# Patient Record
Sex: FEMALE | Race: WHITE | NOT HISPANIC OR LATINO | Employment: UNEMPLOYED | URBAN - NONMETROPOLITAN AREA
[De-identification: names, ages, dates, MRNs, and addresses within clinical notes are randomized per-mention and may not be internally consistent; named-entity substitution may affect disease eponyms.]

---

## 2017-05-30 ENCOUNTER — HOSPITAL ENCOUNTER (INPATIENT)
Facility: HOSPITAL | Age: 72
LOS: 9 days | Discharge: NURSING, REHAB OR LONG-TERM CARE | End: 2017-06-08
Attending: EMERGENCY MEDICINE | Admitting: INTERNAL MEDICINE

## 2017-05-30 ENCOUNTER — APPOINTMENT (OUTPATIENT)
Dept: GENERAL RADIOLOGY | Facility: HOSPITAL | Age: 72
End: 2017-05-30

## 2017-05-30 DIAGNOSIS — J18.9 PNEUMONIA OF RIGHT MIDDLE LOBE DUE TO INFECTIOUS ORGANISM: ICD-10-CM

## 2017-05-30 DIAGNOSIS — J96.01 ACUTE RESPIRATORY FAILURE WITH HYPOXIA AND HYPERCAPNIA (HCC): ICD-10-CM

## 2017-05-30 DIAGNOSIS — J18.9 PNEUMONIA OF RIGHT LUNG DUE TO INFECTIOUS ORGANISM, UNSPECIFIED PART OF LUNG: Primary | ICD-10-CM

## 2017-05-30 DIAGNOSIS — J96.02 ACUTE RESPIRATORY FAILURE WITH HYPOXIA AND HYPERCAPNIA (HCC): ICD-10-CM

## 2017-05-30 PROBLEM — A41.9 SEPSIS DUE TO PNEUMONIA: Status: ACTIVE | Noted: 2017-05-30

## 2017-05-30 LAB
6-ACETYL MORPHINE: NEGATIVE
A-A DO2: 124.4 MMHG (ref 0–300)
A-A DO2: 53.2 MMHG (ref 0–300)
ALBUMIN SERPL-MCNC: 4.4 G/DL (ref 3.4–4.8)
ALBUMIN/GLOB SERPL: 1 G/DL (ref 1.5–2.5)
ALP SERPL-CCNC: 106 U/L (ref 35–104)
ALT SERPL W P-5'-P-CCNC: 34 U/L (ref 10–36)
AMPHET+METHAMPHET UR QL: NEGATIVE
AMYLASE SERPL-CCNC: 48 U/L (ref 28–100)
ANION GAP SERPL CALCULATED.3IONS-SCNC: 2.6 MMOL/L (ref 3.6–11.2)
APTT PPP: 23.8 SECONDS (ref 24.4–31)
ARTERIAL PATENCY WRIST A: ABNORMAL
ARTERIAL PATENCY WRIST A: POSITIVE
AST SERPL-CCNC: 54 U/L (ref 10–30)
ATMOSPHERIC PRESS: 729 MMHG
ATMOSPHERIC PRESS: 729 MMHG
BARBITURATES UR QL SCN: NEGATIVE
BASE EXCESS BLDA CALC-SCNC: -2.6 MMOL/L
BASE EXCESS BLDA CALC-SCNC: -3.3 MMOL/L
BASOPHILS # BLD AUTO: 0.05 10*3/MM3 (ref 0–0.3)
BASOPHILS NFR BLD AUTO: 0.3 % (ref 0–2)
BDY SITE: ABNORMAL
BDY SITE: ABNORMAL
BENZODIAZ UR QL SCN: NEGATIVE
BILIRUB SERPL-MCNC: 0.5 MG/DL (ref 0.2–1.8)
BILIRUB UR QL STRIP: NEGATIVE
BNP SERPL-MCNC: 284 PG/ML (ref 0–100)
BODY TEMPERATURE: 98.6 C
BODY TEMPERATURE: 98.6 C
BUN BLD-MCNC: 13 MG/DL (ref 7–21)
BUN/CREAT SERPL: 12.7 (ref 7–25)
BUPRENORPHINE SERPL-MCNC: NEGATIVE NG/ML
CALCIUM SPEC-SCNC: 9.2 MG/DL (ref 7.7–10)
CANNABINOIDS SERPL QL: NEGATIVE
CHLORIDE SERPL-SCNC: 104 MMOL/L (ref 99–112)
CLARITY UR: CLEAR
CO2 SERPL-SCNC: 31.4 MMOL/L (ref 24.3–31.9)
COCAINE UR QL: NEGATIVE
COHGB MFR BLD: 1.6 % (ref 0–5)
COHGB MFR BLD: 1.7 % (ref 0–5)
COLOR UR: YELLOW
CREAT BLD-MCNC: 1.02 MG/DL (ref 0.43–1.29)
D-LACTATE SERPL-SCNC: 2.4 MMOL/L (ref 0.5–2)
D-LACTATE SERPL-SCNC: 2.7 MMOL/L (ref 0.5–2)
DEPRECATED RDW RBC AUTO: 41.6 FL (ref 37–54)
EOSINOPHIL # BLD AUTO: 0.18 10*3/MM3 (ref 0–0.7)
EOSINOPHIL NFR BLD AUTO: 1 % (ref 0–7)
ERYTHROCYTE [DISTWIDTH] IN BLOOD BY AUTOMATED COUNT: 13 % (ref 11.5–14.5)
GFR SERPL CREATININE-BSD FRML MDRD: 54 ML/MIN/1.73
GLOBULIN UR ELPH-MCNC: 4.2 GM/DL
GLUCOSE BLD-MCNC: 308 MG/DL (ref 70–110)
GLUCOSE UR STRIP-MCNC: NEGATIVE MG/DL
HCO3 BLDA-SCNC: 24.4 MMOL/L (ref 22–26)
HCO3 BLDA-SCNC: 29.9 MMOL/L (ref 22–26)
HCT VFR BLD AUTO: 47 % (ref 37–47)
HCT VFR BLD CALC: 40 % (ref 37–47)
HCT VFR BLD CALC: 45 % (ref 37–47)
HGB BLD-MCNC: 14.4 G/DL (ref 12–16)
HGB BLDA-MCNC: 13.7 G/DL (ref 12–16)
HGB BLDA-MCNC: 15.2 G/DL (ref 12–16)
HGB UR QL STRIP.AUTO: NEGATIVE
HOROWITZ INDEX BLD+IHG-RTO: 32 %
HOROWITZ INDEX BLD+IHG-RTO: 40 %
IMM GRANULOCYTES # BLD: 0.1 10*3/MM3 (ref 0–0.03)
IMM GRANULOCYTES NFR BLD: 0.5 % (ref 0–0.5)
INR PPP: 0.93 (ref 0.8–1.1)
KETONES UR QL STRIP: NEGATIVE
LEUKOCYTE ESTERASE UR QL STRIP.AUTO: NEGATIVE
LIPASE SERPL-CCNC: 33 U/L (ref 13–60)
LYMPHOCYTES # BLD AUTO: 4.32 10*3/MM3 (ref 1–3)
LYMPHOCYTES NFR BLD AUTO: 23 % (ref 16–46)
MCH RBC QN AUTO: 27.1 PG (ref 27–33)
MCHC RBC AUTO-ENTMCNC: 30.6 G/DL (ref 33–37)
MCV RBC AUTO: 88.5 FL (ref 80–94)
MDMA UR QL SCN: NEGATIVE
METHADONE UR QL SCN: NEGATIVE
METHGB BLD QL: 0.4 % (ref 0–3)
METHGB BLD QL: 0.5 % (ref 0–3)
MODALITY: ABNORMAL
MODALITY: ABNORMAL
MONOCYTES # BLD AUTO: 1.46 10*3/MM3 (ref 0.1–0.9)
MONOCYTES NFR BLD AUTO: 7.8 % (ref 0–12)
NEUTROPHILS # BLD AUTO: 12.69 10*3/MM3 (ref 1.4–6.5)
NEUTROPHILS NFR BLD AUTO: 67.4 % (ref 40–75)
NITRITE UR QL STRIP: NEGATIVE
NRBC BLD MANUAL-RTO: 0 /100 WBC (ref 0–0)
OPIATES UR QL: NEGATIVE
OSMOLALITY SERPL CALC.SUM OF ELEC: 287.4 MOSM/KG (ref 273–305)
OXYCODONE UR QL SCN: NEGATIVE
OXYHGB MFR BLDV: 76.4 % (ref 85–100)
OXYHGB MFR BLDV: 93.3 % (ref 85–100)
PCO2 BLDA: 54.7 MM HG (ref 35–45)
PCO2 BLDA: 95 MM HG (ref 35–45)
PCP UR QL SCN: NEGATIVE
PEEP RESPIRATORY: 5 CM[H2O]
PH BLDA: 7.12 PH UNITS (ref 7.35–7.45)
PH BLDA: 7.27 PH UNITS (ref 7.35–7.45)
PH UR STRIP.AUTO: 6 [PH] (ref 5–8)
PLATELET # BLD AUTO: 298 10*3/MM3 (ref 130–400)
PMV BLD AUTO: 12.2 FL (ref 6–10)
PO2 BLDA: 53.9 MM HG (ref 80–100)
PO2 BLDA: 85.5 MM HG (ref 80–100)
POTASSIUM BLD-SCNC: 5.5 MMOL/L (ref 3.5–5.3)
PROT SERPL-MCNC: 8.6 G/DL (ref 6–8)
PROT UR QL STRIP: ABNORMAL
PROTHROMBIN TIME: 10.3 SECONDS (ref 9.8–11.9)
RBC # BLD AUTO: 5.31 10*6/MM3 (ref 4.2–5.4)
SAO2 % BLDCOA: 78 % (ref 90–100)
SAO2 % BLDCOA: 95.3 % (ref 90–100)
SET MECH RESP RATE: 22
SODIUM BLD-SCNC: 138 MMOL/L (ref 135–153)
SP GR UR STRIP: 1.02 (ref 1–1.03)
TROPONIN I SERPL-MCNC: 0.06 NG/ML
TROPONIN I SERPL-MCNC: 0.72 NG/ML
UROBILINOGEN UR QL STRIP: ABNORMAL
VENTILATOR MODE: ABNORMAL
VT ON VENT VENT: 500 ML
WBC NRBC COR # BLD: 18.8 10*3/MM3 (ref 4.5–12.5)

## 2017-05-30 PROCEDURE — 25010000002 HEPARIN (PORCINE) PER 1000 UNITS: Performed by: INTERNAL MEDICINE

## 2017-05-30 PROCEDURE — 82550 ASSAY OF CK (CPK): CPT | Performed by: INTERNAL MEDICINE

## 2017-05-30 PROCEDURE — 94799 UNLISTED PULMONARY SVC/PX: CPT

## 2017-05-30 PROCEDURE — 80307 DRUG TEST PRSMV CHEM ANLYZR: CPT | Performed by: EMERGENCY MEDICINE

## 2017-05-30 PROCEDURE — 83050 HGB METHEMOGLOBIN QUAN: CPT | Performed by: EMERGENCY MEDICINE

## 2017-05-30 PROCEDURE — 93005 ELECTROCARDIOGRAM TRACING: CPT | Performed by: EMERGENCY MEDICINE

## 2017-05-30 PROCEDURE — 80061 LIPID PANEL: CPT | Performed by: INTERNAL MEDICINE

## 2017-05-30 PROCEDURE — 25010000002 SUCCINYLCHOLINE PER 20 MG: Performed by: EMERGENCY MEDICINE

## 2017-05-30 PROCEDURE — 85025 COMPLETE CBC W/AUTO DIFF WBC: CPT | Performed by: EMERGENCY MEDICINE

## 2017-05-30 PROCEDURE — 87449 NOS EACH ORGANISM AG IA: CPT | Performed by: INTERNAL MEDICINE

## 2017-05-30 PROCEDURE — 80053 COMPREHEN METABOLIC PANEL: CPT | Performed by: EMERGENCY MEDICINE

## 2017-05-30 PROCEDURE — 87147 CULTURE TYPE IMMUNOLOGIC: CPT | Performed by: EMERGENCY MEDICINE

## 2017-05-30 PROCEDURE — 25010000002 FUROSEMIDE PER 20 MG: Performed by: EMERGENCY MEDICINE

## 2017-05-30 PROCEDURE — 87077 CULTURE AEROBIC IDENTIFY: CPT | Performed by: EMERGENCY MEDICINE

## 2017-05-30 PROCEDURE — 36600 WITHDRAWAL OF ARTERIAL BLOOD: CPT | Performed by: EMERGENCY MEDICINE

## 2017-05-30 PROCEDURE — 83605 ASSAY OF LACTIC ACID: CPT | Performed by: EMERGENCY MEDICINE

## 2017-05-30 PROCEDURE — 25010000002 MIDAZOLAM 0.5 MG/ML 100 ML NS

## 2017-05-30 PROCEDURE — 87040 BLOOD CULTURE FOR BACTERIA: CPT | Performed by: EMERGENCY MEDICINE

## 2017-05-30 PROCEDURE — 25010000002 CEFTRIAXONE: Performed by: EMERGENCY MEDICINE

## 2017-05-30 PROCEDURE — 83880 ASSAY OF NATRIURETIC PEPTIDE: CPT | Performed by: EMERGENCY MEDICINE

## 2017-05-30 PROCEDURE — 51702 INSERT TEMP BLADDER CATH: CPT

## 2017-05-30 PROCEDURE — 5A1955Z RESPIRATORY VENTILATION, GREATER THAN 96 CONSECUTIVE HOURS: ICD-10-PCS | Performed by: INTERNAL MEDICINE

## 2017-05-30 PROCEDURE — 84484 ASSAY OF TROPONIN QUANT: CPT | Performed by: EMERGENCY MEDICINE

## 2017-05-30 PROCEDURE — 25010000002 MIDAZOLAM PER 1 MG

## 2017-05-30 PROCEDURE — 83690 ASSAY OF LIPASE: CPT | Performed by: EMERGENCY MEDICINE

## 2017-05-30 PROCEDURE — 31500 INSERT EMERGENCY AIRWAY: CPT

## 2017-05-30 PROCEDURE — 94002 VENT MGMT INPAT INIT DAY: CPT

## 2017-05-30 PROCEDURE — 99291 CRITICAL CARE FIRST HOUR: CPT | Performed by: INTERNAL MEDICINE

## 2017-05-30 PROCEDURE — 71010 XR CHEST 1 VW: CPT | Performed by: RADIOLOGY

## 2017-05-30 PROCEDURE — 85610 PROTHROMBIN TIME: CPT | Performed by: EMERGENCY MEDICINE

## 2017-05-30 PROCEDURE — 82553 CREATINE MB FRACTION: CPT | Performed by: INTERNAL MEDICINE

## 2017-05-30 PROCEDURE — 81003 URINALYSIS AUTO W/O SCOPE: CPT | Performed by: EMERGENCY MEDICINE

## 2017-05-30 PROCEDURE — 99291 CRITICAL CARE FIRST HOUR: CPT

## 2017-05-30 PROCEDURE — 0BH17EZ INSERTION OF ENDOTRACHEAL AIRWAY INTO TRACHEA, VIA NATURAL OR ARTIFICIAL OPENING: ICD-10-PCS | Performed by: INTERNAL MEDICINE

## 2017-05-30 PROCEDURE — 83874 ASSAY OF MYOGLOBIN: CPT | Performed by: INTERNAL MEDICINE

## 2017-05-30 PROCEDURE — 82805 BLOOD GASES W/O2 SATURATION: CPT | Performed by: EMERGENCY MEDICINE

## 2017-05-30 PROCEDURE — 25010000002 PROPOFOL 1000 MG/ML EMULSION: Performed by: EMERGENCY MEDICINE

## 2017-05-30 PROCEDURE — 82375 ASSAY CARBOXYHB QUANT: CPT | Performed by: EMERGENCY MEDICINE

## 2017-05-30 PROCEDURE — 25010000002 AZITHROMYCIN: Performed by: EMERGENCY MEDICINE

## 2017-05-30 PROCEDURE — 63710000001 INSULIN REGULAR HUMAN PER 5 UNITS: Performed by: EMERGENCY MEDICINE

## 2017-05-30 PROCEDURE — 87186 SC STD MICRODIL/AGAR DIL: CPT | Performed by: EMERGENCY MEDICINE

## 2017-05-30 PROCEDURE — 85730 THROMBOPLASTIN TIME PARTIAL: CPT | Performed by: EMERGENCY MEDICINE

## 2017-05-30 PROCEDURE — 84484 ASSAY OF TROPONIN QUANT: CPT | Performed by: INTERNAL MEDICINE

## 2017-05-30 PROCEDURE — 71010 HC CHEST PA OR AP: CPT

## 2017-05-30 PROCEDURE — 82150 ASSAY OF AMYLASE: CPT | Performed by: INTERNAL MEDICINE

## 2017-05-30 RX ORDER — SUCCINYLCHOLINE CHLORIDE 20 MG/ML
150 INJECTION INTRAMUSCULAR; INTRAVENOUS ONCE
Status: COMPLETED | OUTPATIENT
Start: 2017-05-30 | End: 2017-05-30

## 2017-05-30 RX ORDER — MIDAZOLAM HYDROCHLORIDE 1 MG/ML
INJECTION INTRAMUSCULAR; INTRAVENOUS
Status: COMPLETED
Start: 2017-05-30 | End: 2017-05-30

## 2017-05-30 RX ORDER — METOPROLOL SUCCINATE 50 MG/1
100 TABLET, EXTENDED RELEASE ORAL DAILY
Status: CANCELLED | OUTPATIENT
Start: 2017-05-31

## 2017-05-30 RX ORDER — IPRATROPIUM BROMIDE AND ALBUTEROL SULFATE 2.5; .5 MG/3ML; MG/3ML
3 SOLUTION RESPIRATORY (INHALATION)
Status: DISCONTINUED | OUTPATIENT
Start: 2017-05-31 | End: 2017-06-08 | Stop reason: HOSPADM

## 2017-05-30 RX ORDER — FENOFIBRATE 48 MG/1
48 TABLET, COATED ORAL DAILY
Status: CANCELLED | OUTPATIENT
Start: 2017-05-31

## 2017-05-30 RX ORDER — SODIUM CHLORIDE 9 MG/ML
INJECTION, SOLUTION INTRAVENOUS
Status: COMPLETED
Start: 2017-05-30 | End: 2017-05-30

## 2017-05-30 RX ORDER — ASPIRIN 325 MG
325 TABLET ORAL DAILY
Status: DISCONTINUED | OUTPATIENT
Start: 2017-05-31 | End: 2017-06-08 | Stop reason: HOSPADM

## 2017-05-30 RX ORDER — MIDAZOLAM IN 0.9 % SOD.CHLORID 1 MG/ML
1-10 PLASTIC BAG, INJECTION (ML) INTRAVENOUS
Status: DISCONTINUED | OUTPATIENT
Start: 2017-05-30 | End: 2017-05-30

## 2017-05-30 RX ORDER — ASPIRIN 325 MG
325 TABLET ORAL ONCE
Status: COMPLETED | OUTPATIENT
Start: 2017-05-31 | End: 2017-05-31

## 2017-05-30 RX ORDER — METOPROLOL SUCCINATE 100 MG/1
100 TABLET, EXTENDED RELEASE ORAL DAILY
COMMUNITY
End: 2017-06-08 | Stop reason: HOSPADM

## 2017-05-30 RX ORDER — FENOFIBRATE 54 MG/1
54 TABLET ORAL DAILY
COMMUNITY
End: 2017-06-08 | Stop reason: HOSPADM

## 2017-05-30 RX ORDER — ETOMIDATE 2 MG/ML
30 INJECTION INTRAVENOUS ONCE
Status: COMPLETED | OUTPATIENT
Start: 2017-05-30 | End: 2017-05-30

## 2017-05-30 RX ORDER — MIDAZOLAM HYDROCHLORIDE 1 MG/ML
4 INJECTION INTRAMUSCULAR; INTRAVENOUS ONCE
Status: COMPLETED | OUTPATIENT
Start: 2017-05-30 | End: 2017-05-30

## 2017-05-30 RX ORDER — SODIUM CHLORIDE 9 MG/ML
100 INJECTION, SOLUTION INTRAVENOUS CONTINUOUS
Status: DISCONTINUED | OUTPATIENT
Start: 2017-05-30 | End: 2017-06-02

## 2017-05-30 RX ORDER — ACETAMINOPHEN 650 MG/1
650 SUPPOSITORY RECTAL ONCE
Status: COMPLETED | OUTPATIENT
Start: 2017-05-30 | End: 2017-05-30

## 2017-05-30 RX ORDER — FUROSEMIDE 10 MG/ML
40 INJECTION INTRAMUSCULAR; INTRAVENOUS ONCE
Status: COMPLETED | OUTPATIENT
Start: 2017-05-30 | End: 2017-05-30

## 2017-05-30 RX ORDER — MIDAZOLAM IN 0.9 % SOD.CHLORID 1 MG/ML
PLASTIC BAG, INJECTION (ML) INTRAVENOUS
Status: COMPLETED
Start: 2017-05-30 | End: 2017-05-30

## 2017-05-30 RX ORDER — MIDAZOLAM HYDROCHLORIDE 1 MG/ML
2 INJECTION INTRAMUSCULAR; INTRAVENOUS ONCE
Status: COMPLETED | OUTPATIENT
Start: 2017-05-30 | End: 2017-05-30

## 2017-05-30 RX ORDER — LISINOPRIL 10 MG/1
10 TABLET ORAL DAILY
Status: CANCELLED | OUTPATIENT
Start: 2017-05-31

## 2017-05-30 RX ORDER — SODIUM CHLORIDE 0.9 % (FLUSH) 0.9 %
1-10 SYRINGE (ML) INJECTION AS NEEDED
Status: DISCONTINUED | OUTPATIENT
Start: 2017-05-30 | End: 2017-06-08 | Stop reason: HOSPADM

## 2017-05-30 RX ORDER — FUROSEMIDE 20 MG/1
20 TABLET ORAL DAILY
Status: CANCELLED | OUTPATIENT
Start: 2017-05-31

## 2017-05-30 RX ORDER — HEPARIN SODIUM 5000 [USP'U]/ML
5000 INJECTION, SOLUTION INTRAVENOUS; SUBCUTANEOUS EVERY 12 HOURS SCHEDULED
Status: DISCONTINUED | OUTPATIENT
Start: 2017-05-30 | End: 2017-05-31

## 2017-05-30 RX ADMIN — Medication 1 MG/HR: at 18:35

## 2017-05-30 RX ADMIN — SODIUM CHLORIDE 100 ML/HR: 900 INJECTION, SOLUTION INTRAVENOUS at 23:23

## 2017-05-30 RX ADMIN — ACETAMINOPHEN 650 MG: 650 SUPPOSITORY RECTAL at 16:45

## 2017-05-30 RX ADMIN — ETOMIDATE 30 MG: 2 INJECTION, SOLUTION INTRAVENOUS at 16:35

## 2017-05-30 RX ADMIN — HEPARIN SODIUM 5000 UNITS: 5000 INJECTION, SOLUTION INTRAVENOUS; SUBCUTANEOUS at 23:24

## 2017-05-30 RX ADMIN — SUCCINYLCHOLINE CHLORIDE 150 MG: 20 INJECTION, SOLUTION INTRAMUSCULAR; INTRAVENOUS at 16:37

## 2017-05-30 RX ADMIN — AZITHROMYCIN 500 MG: 500 INJECTION, POWDER, LYOPHILIZED, FOR SOLUTION INTRAVENOUS at 17:26

## 2017-05-30 RX ADMIN — FUROSEMIDE 40 MG: 10 INJECTION, SOLUTION INTRAMUSCULAR; INTRAVENOUS at 17:05

## 2017-05-30 RX ADMIN — MIDAZOLAM HYDROCHLORIDE 2 MG: 1 INJECTION INTRAMUSCULAR; INTRAVENOUS at 20:01

## 2017-05-30 RX ADMIN — PROPOFOL 5 MCG/KG/MIN: 10 INJECTION, EMULSION INTRAVENOUS at 16:50

## 2017-05-30 RX ADMIN — MIDAZOLAM HYDROCHLORIDE 4 MG: 1 INJECTION INTRAMUSCULAR; INTRAVENOUS at 18:10

## 2017-05-30 RX ADMIN — MIDAZOLAM HYDROCHLORIDE 2 MG: 1 INJECTION, SOLUTION INTRAMUSCULAR; INTRAVENOUS at 20:01

## 2017-05-30 RX ADMIN — HUMAN INSULIN 8 UNITS: 100 INJECTION, SOLUTION SUBCUTANEOUS at 19:30

## 2017-05-30 RX ADMIN — MIDAZOLAM HYDROCHLORIDE 4 MG: 1 INJECTION, SOLUTION INTRAMUSCULAR; INTRAVENOUS at 18:10

## 2017-05-30 RX ADMIN — CEFTRIAXONE 1 G: 1 INJECTION, POWDER, FOR SOLUTION INTRAMUSCULAR; INTRAVENOUS at 16:57

## 2017-05-30 RX ADMIN — SODIUM CHLORIDE 500 ML: 9 INJECTION, SOLUTION INTRAVENOUS at 19:30

## 2017-05-30 RX ADMIN — SODIUM CHLORIDE 1000 ML: 9 INJECTION, SOLUTION INTRAVENOUS at 17:52

## 2017-05-31 ENCOUNTER — APPOINTMENT (OUTPATIENT)
Dept: CARDIOLOGY | Facility: HOSPITAL | Age: 72
End: 2017-05-31
Attending: INTERNAL MEDICINE

## 2017-05-31 LAB
A-A DO2: 168.1 MMHG (ref 0–300)
A-A DO2: 170.2 MMHG (ref 0–300)
ALBUMIN SERPL-MCNC: 3.5 G/DL (ref 3.4–4.8)
ALBUMIN/GLOB SERPL: 1 G/DL (ref 1.5–2.5)
ALP SERPL-CCNC: 84 U/L (ref 35–104)
ALT SERPL W P-5'-P-CCNC: 33 U/L (ref 10–36)
ANION GAP SERPL CALCULATED.3IONS-SCNC: 8.3 MMOL/L (ref 3.6–11.2)
APTT PPP: 25.4 SECONDS (ref 24.4–31)
APTT PPP: 32.9 SECONDS (ref 24.4–31)
APTT PPP: 41.6 SECONDS (ref 24.4–31)
APTT PPP: 59.2 SECONDS (ref 24.4–31)
ARTERIAL PATENCY WRIST A: ABNORMAL
ARTERIAL PATENCY WRIST A: POSITIVE
AST SERPL-CCNC: 44 U/L (ref 10–30)
ATMOSPHERIC PRESS: 727 MMHG
ATMOSPHERIC PRESS: 727 MMHG
BASE EXCESS BLDA CALC-SCNC: -0.4 MMOL/L
BASE EXCESS BLDA CALC-SCNC: -4.4 MMOL/L
BASOPHILS # BLD AUTO: 0.01 10*3/MM3 (ref 0–0.3)
BASOPHILS NFR BLD AUTO: 0.1 % (ref 0–2)
BDY SITE: ABNORMAL
BDY SITE: ABNORMAL
BH CV ECHO MEAS - ACS: 2 CM
BH CV ECHO MEAS - AO ROOT AREA (BSA CORRECTED): 1.4
BH CV ECHO MEAS - AO ROOT AREA: 7.1 CM^2
BH CV ECHO MEAS - AO ROOT DIAM: 3 CM
BH CV ECHO MEAS - BSA(HAYCOCK): 2.3 M^2
BH CV ECHO MEAS - BSA: 2.2 M^2
BH CV ECHO MEAS - BZI_BMI: 35.9 KILOGRAMS/M^2
BH CV ECHO MEAS - BZI_METRIC_HEIGHT: 172.7 CM
BH CV ECHO MEAS - BZI_METRIC_WEIGHT: 107.1 KG
BH CV ECHO MEAS - CONTRAST EF 4CH: 61.3 ML/M^2
BH CV ECHO MEAS - EDV(CUBED): 167.5 ML
BH CV ECHO MEAS - EDV(MOD-SP4): 106 ML
BH CV ECHO MEAS - EDV(TEICH): 148.2 ML
BH CV ECHO MEAS - EF(CUBED): 76.4 %
BH CV ECHO MEAS - EF(TEICH): 67.9 %
BH CV ECHO MEAS - ESV(CUBED): 39.5 ML
BH CV ECHO MEAS - ESV(MOD-SP4): 41 ML
BH CV ECHO MEAS - ESV(TEICH): 47.6 ML
BH CV ECHO MEAS - FS: 38.2 %
BH CV ECHO MEAS - IVS/LVPW: 1.2
BH CV ECHO MEAS - IVSD: 1.3 CM
BH CV ECHO MEAS - LA DIMENSION: 4 CM
BH CV ECHO MEAS - LA/AO: 1.3
BH CV ECHO MEAS - LV DIASTOLIC VOL/BSA (35-75): 48.3 ML/M^2
BH CV ECHO MEAS - LV MASS(C)D: 269.5 GRAMS
BH CV ECHO MEAS - LV MASS(C)DI: 122.9 GRAMS/M^2
BH CV ECHO MEAS - LV SYSTOLIC VOL/BSA (12-30): 18.7 ML/M^2
BH CV ECHO MEAS - LVIDD: 5.5 CM
BH CV ECHO MEAS - LVIDS: 3.4 CM
BH CV ECHO MEAS - LVLD AP4: 6.6 CM
BH CV ECHO MEAS - LVLS AP4: 6.9 CM
BH CV ECHO MEAS - LVOT AREA (M): 3.1 CM^2
BH CV ECHO MEAS - LVOT AREA: 3.2 CM^2
BH CV ECHO MEAS - LVOT DIAM: 2 CM
BH CV ECHO MEAS - LVPWD: 1.1 CM
BH CV ECHO MEAS - MV A MAX VEL: 83 CM/SEC
BH CV ECHO MEAS - MV E MAX VEL: 78.2 CM/SEC
BH CV ECHO MEAS - MV E/A: 0.94
BH CV ECHO MEAS - PA ACC SLOPE: 945.7 CM/SEC^2
BH CV ECHO MEAS - PA ACC TIME: 0.1 SEC
BH CV ECHO MEAS - PA PR(ACCEL): 34.6 MMHG
BH CV ECHO MEAS - RAP SYSTOLE: 10 MMHG
BH CV ECHO MEAS - RVSP: 35.8 MMHG
BH CV ECHO MEAS - SI(CUBED): 58.4 ML/M^2
BH CV ECHO MEAS - SI(MOD-SP4): 29.6 ML/M^2
BH CV ECHO MEAS - SI(TEICH): 45.8 ML/M^2
BH CV ECHO MEAS - SV(CUBED): 128 ML
BH CV ECHO MEAS - SV(MOD-SP4): 65 ML
BH CV ECHO MEAS - SV(TEICH): 100.5 ML
BH CV ECHO MEAS - TR MAX VEL: 253.8 CM/SEC
BILIRUB SERPL-MCNC: 0.4 MG/DL (ref 0.2–1.8)
BNP SERPL-MCNC: 306 PG/ML (ref 0–100)
BODY TEMPERATURE: 98.6 C
BODY TEMPERATURE: 98.6 C
BUN BLD-MCNC: 20 MG/DL (ref 7–21)
BUN/CREAT SERPL: 17.1 (ref 7–25)
CALCIUM SPEC-SCNC: 8.9 MG/DL (ref 7.7–10)
CHLORIDE SERPL-SCNC: 108 MMOL/L (ref 99–112)
CHOLEST SERPL-MCNC: 125 MG/DL (ref 0–200)
CK MB SERPL-CCNC: 2.59 NG/ML (ref 0–5)
CK MB SERPL-CCNC: 2.76 NG/ML (ref 0–5)
CK MB SERPL-CCNC: 2.87 NG/ML (ref 0–5)
CK MB SERPL-RTO: 5.6 % (ref 0–3)
CK SERPL-CCNC: 45 U/L (ref 24–173)
CK SERPL-CCNC: 51 U/L (ref 24–173)
CO2 SERPL-SCNC: 24.7 MMOL/L (ref 24.3–31.9)
COHGB MFR BLD: 1.3 % (ref 0–5)
COHGB MFR BLD: 1.8 % (ref 0–5)
CREAT BLD-MCNC: 1.17 MG/DL (ref 0.43–1.29)
CRP SERPL-MCNC: 5.64 MG/DL (ref 0–0.99)
DEPRECATED RDW RBC AUTO: 40.6 FL (ref 37–54)
EOSINOPHIL # BLD AUTO: 0 10*3/MM3 (ref 0–0.7)
EOSINOPHIL NFR BLD AUTO: 0 % (ref 0–7)
ERYTHROCYTE [DISTWIDTH] IN BLOOD BY AUTOMATED COUNT: 13 % (ref 11.5–14.5)
GFR SERPL CREATININE-BSD FRML MDRD: 46 ML/MIN/1.73
GLOBULIN UR ELPH-MCNC: 3.5 GM/DL
GLUCOSE BLD-MCNC: 201 MG/DL (ref 70–110)
GLUCOSE BLDC GLUCOMTR-MCNC: 157 MG/DL (ref 70–130)
GLUCOSE BLDC GLUCOMTR-MCNC: 163 MG/DL (ref 70–130)
HBA1C MFR BLD: 7.3 % (ref 4.5–5.7)
HCO3 BLDA-SCNC: 19.7 MMOL/L (ref 22–26)
HCO3 BLDA-SCNC: 22.4 MMOL/L (ref 22–26)
HCT VFR BLD AUTO: 39.7 % (ref 37–47)
HCT VFR BLD CALC: 37 % (ref 37–47)
HCT VFR BLD CALC: 38 % (ref 37–47)
HDLC SERPL-MCNC: 34 MG/DL (ref 60–100)
HGB BLD-MCNC: 12.5 G/DL (ref 12–16)
HGB BLDA-MCNC: 12.6 G/DL (ref 12–16)
HGB BLDA-MCNC: 13 G/DL (ref 12–16)
HOROWITZ INDEX BLD+IHG-RTO: 40 %
HOROWITZ INDEX BLD+IHG-RTO: 40 %
IMM GRANULOCYTES # BLD: 0.07 10*3/MM3 (ref 0–0.03)
IMM GRANULOCYTES NFR BLD: 0.5 % (ref 0–0.5)
INR PPP: 0.98 (ref 0.8–1.1)
INR PPP: 1.03 (ref 0.8–1.1)
L PNEUMO1 AG UR QL IA: NEGATIVE
LDLC SERPL CALC-MCNC: 78 MG/DL (ref 0–100)
LDLC/HDLC SERPL: 2.29 {RATIO}
LYMPHOCYTES # BLD AUTO: 0.89 10*3/MM3 (ref 1–3)
LYMPHOCYTES NFR BLD AUTO: 6.4 % (ref 16–46)
M PNEUMO IGM SER QL: NEGATIVE
MAGNESIUM SERPL-MCNC: 1.6 MG/DL (ref 1.7–2.6)
MCH RBC QN AUTO: 27.1 PG (ref 27–33)
MCHC RBC AUTO-ENTMCNC: 31.5 G/DL (ref 33–37)
MCV RBC AUTO: 85.9 FL (ref 80–94)
METHGB BLD QL: 0.2 % (ref 0–3)
METHGB BLD QL: 0.3 % (ref 0–3)
MODALITY: ABNORMAL
MODALITY: ABNORMAL
MONOCYTES # BLD AUTO: 0.68 10*3/MM3 (ref 0.1–0.9)
MONOCYTES NFR BLD AUTO: 4.9 % (ref 0–12)
MYOGLOBIN SERPL-MCNC: 75 NG/ML (ref 0–109)
MYOGLOBIN SERPL-MCNC: 91 NG/ML (ref 0–109)
MYOGLOBIN SERPL-MCNC: 98 NG/ML (ref 0–109)
NEUTROPHILS # BLD AUTO: 12.34 10*3/MM3 (ref 1.4–6.5)
NEUTROPHILS NFR BLD AUTO: 88.1 % (ref 40–75)
OSMOLALITY SERPL CALC.SUM OF ELEC: 289.6 MOSM/KG (ref 273–305)
OXYHGB MFR BLDV: 90.8 % (ref 85–100)
OXYHGB MFR BLDV: 92.9 % (ref 85–100)
PCO2 BLDA: 31.4 MM HG (ref 35–45)
PCO2 BLDA: 33.5 MM HG (ref 35–45)
PEEP RESPIRATORY: 5 CM[H2O]
PEEP RESPIRATORY: 5 CM[H2O]
PH BLDA: 7.39 PH UNITS (ref 7.35–7.45)
PH BLDA: 7.47 PH UNITS (ref 7.35–7.45)
PHOSPHATE SERPL-MCNC: 1.2 MG/DL (ref 2.7–4.5)
PLATELET # BLD AUTO: 204 10*3/MM3 (ref 130–400)
PMV BLD AUTO: 11.9 FL (ref 6–10)
PO2 BLDA: 65.4 MM HG (ref 80–100)
PO2 BLDA: 65.7 MM HG (ref 80–100)
POTASSIUM BLD-SCNC: 3.6 MMOL/L (ref 3.5–5.3)
PROT SERPL-MCNC: 7 G/DL (ref 6–8)
PROTHROMBIN TIME: 10.9 SECONDS (ref 9.8–11.9)
PROTHROMBIN TIME: 11.5 SECONDS (ref 9.8–11.9)
RBC # BLD AUTO: 4.62 10*6/MM3 (ref 4.2–5.4)
SAO2 % BLDCOA: 92.7 % (ref 90–100)
SAO2 % BLDCOA: 94.3 % (ref 90–100)
SET MECH RESP RATE: 18
SET MECH RESP RATE: 22
SODIUM BLD-SCNC: 141 MMOL/L (ref 135–153)
T4 FREE SERPL-MCNC: 1.88 NG/DL (ref 0.89–1.76)
TRIGL SERPL-MCNC: 65 MG/DL (ref 0–150)
TROPONIN I SERPL-MCNC: 0.45 NG/ML
TROPONIN I SERPL-MCNC: 0.74 NG/ML
TROPONIN I SERPL-MCNC: 1.12 NG/ML
VENTILATOR MODE: AC
VENTILATOR MODE: AC
VLDLC SERPL-MCNC: 13 MG/DL
VT ON VENT VENT: 500 ML
VT ON VENT VENT: 500 ML
WBC NRBC COR # BLD: 13.99 10*3/MM3 (ref 4.5–12.5)

## 2017-05-31 PROCEDURE — 31500 INSERT EMERGENCY AIRWAY: CPT | Performed by: EMERGENCY MEDICINE

## 2017-05-31 PROCEDURE — 84100 ASSAY OF PHOSPHORUS: CPT | Performed by: INTERNAL MEDICINE

## 2017-05-31 PROCEDURE — 94799 UNLISTED PULMONARY SVC/PX: CPT

## 2017-05-31 PROCEDURE — 94003 VENT MGMT INPAT SUBQ DAY: CPT

## 2017-05-31 PROCEDURE — 80053 COMPREHEN METABOLIC PANEL: CPT | Performed by: INTERNAL MEDICINE

## 2017-05-31 PROCEDURE — 99291 CRITICAL CARE FIRST HOUR: CPT | Performed by: INTERNAL MEDICINE

## 2017-05-31 PROCEDURE — 93010 ELECTROCARDIOGRAM REPORT: CPT | Performed by: INTERNAL MEDICINE

## 2017-05-31 PROCEDURE — 87040 BLOOD CULTURE FOR BACTERIA: CPT | Performed by: INTERNAL MEDICINE

## 2017-05-31 PROCEDURE — 25010000002 PROPOFOL 1000 MG/ML EMULSION: Performed by: INTERNAL MEDICINE

## 2017-05-31 PROCEDURE — 82375 ASSAY CARBOXYHB QUANT: CPT | Performed by: INTERNAL MEDICINE

## 2017-05-31 PROCEDURE — 83874 ASSAY OF MYOGLOBIN: CPT | Performed by: INTERNAL MEDICINE

## 2017-05-31 PROCEDURE — 85610 PROTHROMBIN TIME: CPT | Performed by: INTERNAL MEDICINE

## 2017-05-31 PROCEDURE — 25010000002 HEPARIN (PORCINE) PER 1000 UNITS: Performed by: INTERNAL MEDICINE

## 2017-05-31 PROCEDURE — 82962 GLUCOSE BLOOD TEST: CPT

## 2017-05-31 PROCEDURE — 83735 ASSAY OF MAGNESIUM: CPT | Performed by: INTERNAL MEDICINE

## 2017-05-31 PROCEDURE — 84484 ASSAY OF TROPONIN QUANT: CPT | Performed by: INTERNAL MEDICINE

## 2017-05-31 PROCEDURE — 85730 THROMBOPLASTIN TIME PARTIAL: CPT | Performed by: INTERNAL MEDICINE

## 2017-05-31 PROCEDURE — 25010000002 CEFEPIME: Performed by: INTERNAL MEDICINE

## 2017-05-31 PROCEDURE — 82553 CREATINE MB FRACTION: CPT | Performed by: INTERNAL MEDICINE

## 2017-05-31 PROCEDURE — 94002 VENT MGMT INPAT INIT DAY: CPT

## 2017-05-31 PROCEDURE — 82805 BLOOD GASES W/O2 SATURATION: CPT | Performed by: INTERNAL MEDICINE

## 2017-05-31 PROCEDURE — 86140 C-REACTIVE PROTEIN: CPT | Performed by: INTERNAL MEDICINE

## 2017-05-31 PROCEDURE — 93005 ELECTROCARDIOGRAM TRACING: CPT | Performed by: INTERNAL MEDICINE

## 2017-05-31 PROCEDURE — 82550 ASSAY OF CK (CPK): CPT | Performed by: INTERNAL MEDICINE

## 2017-05-31 PROCEDURE — 25010000002 DOPAMINE PER 40 MG

## 2017-05-31 PROCEDURE — 36600 WITHDRAWAL OF ARTERIAL BLOOD: CPT | Performed by: INTERNAL MEDICINE

## 2017-05-31 PROCEDURE — 93306 TTE W/DOPPLER COMPLETE: CPT

## 2017-05-31 PROCEDURE — 25010000002 DOPAMINE PER 40 MG: Performed by: INTERNAL MEDICINE

## 2017-05-31 PROCEDURE — 83050 HGB METHEMOGLOBIN QUAN: CPT | Performed by: INTERNAL MEDICINE

## 2017-05-31 PROCEDURE — 25010000002 VANCOMYCIN PER 500 MG: Performed by: INTERNAL MEDICINE

## 2017-05-31 PROCEDURE — 85025 COMPLETE CBC W/AUTO DIFF WBC: CPT | Performed by: INTERNAL MEDICINE

## 2017-05-31 PROCEDURE — 83036 HEMOGLOBIN GLYCOSYLATED A1C: CPT | Performed by: INTERNAL MEDICINE

## 2017-05-31 PROCEDURE — 83880 ASSAY OF NATRIURETIC PEPTIDE: CPT | Performed by: INTERNAL MEDICINE

## 2017-05-31 PROCEDURE — 94640 AIRWAY INHALATION TREATMENT: CPT

## 2017-05-31 PROCEDURE — 84439 ASSAY OF FREE THYROXINE: CPT | Performed by: INTERNAL MEDICINE

## 2017-05-31 RX ORDER — HEPARIN SODIUM 5000 [USP'U]/ML
46.7 INJECTION, SOLUTION INTRAVENOUS; SUBCUTANEOUS ONCE
Status: DISCONTINUED | OUTPATIENT
Start: 2017-05-31 | End: 2017-05-31

## 2017-05-31 RX ORDER — MAGNESIUM SULFATE HEPTAHYDRATE 40 MG/ML
2 INJECTION, SOLUTION INTRAVENOUS AS NEEDED
Status: DISCONTINUED | OUTPATIENT
Start: 2017-05-31 | End: 2017-06-08 | Stop reason: HOSPADM

## 2017-05-31 RX ORDER — HEPARIN SODIUM 5000 [USP'U]/ML
46.7 INJECTION, SOLUTION INTRAVENOUS; SUBCUTANEOUS AS NEEDED
Status: DISCONTINUED | OUTPATIENT
Start: 2017-05-31 | End: 2017-06-01

## 2017-05-31 RX ORDER — POTASSIUM CHLORIDE 750 MG/1
40 CAPSULE, EXTENDED RELEASE ORAL AS NEEDED
Status: DISCONTINUED | OUTPATIENT
Start: 2017-05-31 | End: 2017-06-08 | Stop reason: HOSPADM

## 2017-05-31 RX ORDER — MAGNESIUM SULFATE HEPTAHYDRATE 40 MG/ML
4 INJECTION, SOLUTION INTRAVENOUS ONCE
Status: COMPLETED | OUTPATIENT
Start: 2017-05-31 | End: 2017-05-31

## 2017-05-31 RX ORDER — HEPARIN SODIUM 5000 [USP'U]/ML
23.4 INJECTION, SOLUTION INTRAVENOUS; SUBCUTANEOUS AS NEEDED
Status: DISCONTINUED | OUTPATIENT
Start: 2017-05-31 | End: 2017-06-01

## 2017-05-31 RX ORDER — MAGNESIUM SULFATE HEPTAHYDRATE 40 MG/ML
4 INJECTION, SOLUTION INTRAVENOUS AS NEEDED
Status: DISCONTINUED | OUTPATIENT
Start: 2017-05-31 | End: 2017-06-08 | Stop reason: HOSPADM

## 2017-05-31 RX ORDER — CASTOR OIL AND BALSAM, PERU 788; 87 MG/G; MG/G
OINTMENT TOPICAL EVERY 12 HOURS SCHEDULED
Status: DISCONTINUED | OUTPATIENT
Start: 2017-05-31 | End: 2017-06-08 | Stop reason: HOSPADM

## 2017-05-31 RX ORDER — DOPAMINE HYDROCHLORIDE 160 MG/100ML
5 INJECTION, SOLUTION INTRAVENOUS
Status: DISCONTINUED | OUTPATIENT
Start: 2017-05-31 | End: 2017-06-05

## 2017-05-31 RX ORDER — POTASSIUM CHLORIDE 7.45 MG/ML
10 INJECTION INTRAVENOUS
Status: DISCONTINUED | OUTPATIENT
Start: 2017-05-31 | End: 2017-06-08 | Stop reason: HOSPADM

## 2017-05-31 RX ORDER — POTASSIUM CHLORIDE 20 MEQ/1
40 TABLET, EXTENDED RELEASE ORAL EVERY 4 HOURS
Status: COMPLETED | OUTPATIENT
Start: 2017-05-31 | End: 2017-05-31

## 2017-05-31 RX ORDER — ATORVASTATIN CALCIUM 20 MG/1
20 TABLET, FILM COATED ORAL NIGHTLY
Status: DISCONTINUED | OUTPATIENT
Start: 2017-05-31 | End: 2017-06-08 | Stop reason: HOSPADM

## 2017-05-31 RX ORDER — DOPAMINE HYDROCHLORIDE 160 MG/100ML
INJECTION, SOLUTION INTRAVENOUS
Status: COMPLETED
Start: 2017-05-31 | End: 2017-05-31

## 2017-05-31 RX ORDER — POTASSIUM CHLORIDE 1.5 G/1.77G
40 POWDER, FOR SOLUTION ORAL AS NEEDED
Status: DISCONTINUED | OUTPATIENT
Start: 2017-05-31 | End: 2017-06-08 | Stop reason: HOSPADM

## 2017-05-31 RX ADMIN — METRONIDAZOLE 500 MG: 500 INJECTION, SOLUTION INTRAVENOUS at 23:33

## 2017-05-31 RX ADMIN — DOPAMINE HYDROCHLORIDE 5 MCG/KG/MIN: 160 INJECTION, SOLUTION INTRAVENOUS at 22:31

## 2017-05-31 RX ADMIN — POTASSIUM CHLORIDE 40 MEQ: 1500 TABLET, EXTENDED RELEASE ORAL at 16:03

## 2017-05-31 RX ADMIN — DOXYCYCLINE 100 MG: 100 INJECTION, POWDER, LYOPHILIZED, FOR SOLUTION INTRAVENOUS at 12:53

## 2017-05-31 RX ADMIN — MAGNESIUM SULFATE HEPTAHYDRATE 4 G: 40 INJECTION, SOLUTION INTRAVENOUS at 11:45

## 2017-05-31 RX ADMIN — ASPIRIN 325 MG: 325 TABLET ORAL at 13:06

## 2017-05-31 RX ADMIN — HEPARIN SODIUM 5000 UNITS: 5000 INJECTION, SOLUTION INTRAVENOUS; SUBCUTANEOUS at 23:35

## 2017-05-31 RX ADMIN — IPRATROPIUM BROMIDE AND ALBUTEROL SULFATE 3 ML: .5; 3 SOLUTION RESPIRATORY (INHALATION) at 06:45

## 2017-05-31 RX ADMIN — SODIUM CHLORIDE 100 ML/HR: 900 INJECTION, SOLUTION INTRAVENOUS at 11:06

## 2017-05-31 RX ADMIN — PROPOFOL 40 MCG/KG/MIN: 10 INJECTION, EMULSION INTRAVENOUS at 20:31

## 2017-05-31 RX ADMIN — CEFEPIME 2 G: 2 INJECTION, POWDER, FOR SOLUTION INTRAVENOUS at 00:42

## 2017-05-31 RX ADMIN — CEFEPIME 2 G: 2 INJECTION, POWDER, FOR SOLUTION INTRAVENOUS at 23:33

## 2017-05-31 RX ADMIN — METRONIDAZOLE 500 MG: 500 INJECTION, SOLUTION INTRAVENOUS at 09:40

## 2017-05-31 RX ADMIN — ATORVASTATIN CALCIUM 20 MG: 20 TABLET, FILM COATED ORAL at 20:30

## 2017-05-31 RX ADMIN — METRONIDAZOLE 500 MG: 500 INJECTION, SOLUTION INTRAVENOUS at 16:03

## 2017-05-31 RX ADMIN — CASTOR OIL AND BALSAM, PERU: 788; 87 OINTMENT TOPICAL at 20:21

## 2017-05-31 RX ADMIN — Medication: at 00:41

## 2017-05-31 RX ADMIN — IPRATROPIUM BROMIDE AND ALBUTEROL SULFATE 3 ML: .5; 3 SOLUTION RESPIRATORY (INHALATION) at 19:11

## 2017-05-31 RX ADMIN — IPRATROPIUM BROMIDE AND ALBUTEROL SULFATE 3 ML: .5; 3 SOLUTION RESPIRATORY (INHALATION) at 00:51

## 2017-05-31 RX ADMIN — HEPARIN SODIUM 5000 UNITS: 5000 INJECTION, SOLUTION INTRAVENOUS; SUBCUTANEOUS at 08:41

## 2017-05-31 RX ADMIN — DOXYCYCLINE 100 MG: 100 INJECTION, POWDER, LYOPHILIZED, FOR SOLUTION INTRAVENOUS at 00:42

## 2017-05-31 RX ADMIN — METRONIDAZOLE 500 MG: 500 INJECTION, SOLUTION INTRAVENOUS at 00:43

## 2017-05-31 RX ADMIN — SODIUM CHLORIDE 100 ML/HR: 900 INJECTION, SOLUTION INTRAVENOUS at 22:30

## 2017-05-31 RX ADMIN — CEFEPIME 2 G: 2 INJECTION, POWDER, FOR SOLUTION INTRAVENOUS at 16:03

## 2017-05-31 RX ADMIN — VANCOMYCIN HYDROCHLORIDE 1500 MG: 5 INJECTION, POWDER, LYOPHILIZED, FOR SOLUTION INTRAVENOUS at 16:04

## 2017-05-31 RX ADMIN — HEPARIN SODIUM 11.6 UNITS/KG/HR: 10000 INJECTION, SOLUTION INTRAVENOUS at 20:30

## 2017-05-31 RX ADMIN — DOPAMINE HYDROCHLORIDE 5 MCG: 160 INJECTION, SOLUTION INTRAVENOUS at 11:04

## 2017-05-31 RX ADMIN — PROPOFOL 5 MCG/KG/MIN: 10 INJECTION, EMULSION INTRAVENOUS at 08:35

## 2017-05-31 RX ADMIN — POTASSIUM CHLORIDE 40 MEQ: 1500 TABLET, EXTENDED RELEASE ORAL at 13:01

## 2017-05-31 RX ADMIN — CASTOR OIL AND BALSAM, PERU: 788; 87 OINTMENT TOPICAL at 12:53

## 2017-05-31 RX ADMIN — Medication: at 08:00

## 2017-05-31 RX ADMIN — HEPARIN SODIUM 9.35 UNITS/KG/HR: 10000 INJECTION, SOLUTION INTRAVENOUS at 01:37

## 2017-05-31 RX ADMIN — CEFEPIME 2 G: 2 INJECTION, POWDER, FOR SOLUTION INTRAVENOUS at 09:40

## 2017-05-31 RX ADMIN — PROPOFOL 30 MCG/KG/MIN: 10 INJECTION, EMULSION INTRAVENOUS at 16:02

## 2017-05-31 RX ADMIN — DEXMEDETOMIDINE HYDROCHLORIDE 0.2 MCG/KG/HR: 100 INJECTION, SOLUTION INTRAVENOUS at 06:51

## 2017-05-31 RX ADMIN — IPRATROPIUM BROMIDE AND ALBUTEROL SULFATE 3 ML: .5; 3 SOLUTION RESPIRATORY (INHALATION) at 12:21

## 2017-05-31 RX ADMIN — ASPIRIN 325 MG: 325 TABLET ORAL at 00:42

## 2017-06-01 ENCOUNTER — APPOINTMENT (OUTPATIENT)
Dept: GENERAL RADIOLOGY | Facility: HOSPITAL | Age: 72
End: 2017-06-01

## 2017-06-01 LAB
A-A DO2: 132 MMHG (ref 0–300)
ALBUMIN SERPL-MCNC: 3.3 G/DL (ref 3.4–4.8)
ALBUMIN/GLOB SERPL: 1.1 G/DL (ref 1.5–2.5)
ALP SERPL-CCNC: 67 U/L (ref 35–104)
ALT SERPL W P-5'-P-CCNC: 21 U/L (ref 10–36)
ANION GAP SERPL CALCULATED.3IONS-SCNC: 1.5 MMOL/L (ref 3.6–11.2)
APTT PPP: 53 SECONDS (ref 24.4–31)
APTT PPP: 62.6 SECONDS (ref 24.4–31)
APTT PPP: 93 SECONDS (ref 24.4–31)
ARTERIAL PATENCY WRIST A: POSITIVE
AST SERPL-CCNC: 17 U/L (ref 10–30)
ATMOSPHERIC PRESS: 729 MMHG
BASE EXCESS BLDA CALC-SCNC: -3.5 MMOL/L
BASOPHILS # BLD AUTO: 0.04 10*3/MM3 (ref 0–0.3)
BASOPHILS NFR BLD AUTO: 0.3 % (ref 0–2)
BDY SITE: ABNORMAL
BILIRUB SERPL-MCNC: 0.2 MG/DL (ref 0.2–1.8)
BNP SERPL-MCNC: 89 PG/ML (ref 0–100)
BODY TEMPERATURE: 98.6 C
BUN BLD-MCNC: 19 MG/DL (ref 7–21)
BUN/CREAT SERPL: 27.9 (ref 7–25)
CALCIUM SPEC-SCNC: 8.6 MG/DL (ref 7.7–10)
CHLORIDE SERPL-SCNC: 115 MMOL/L (ref 99–112)
CHOLEST SERPL-MCNC: 111 MG/DL (ref 0–200)
CK MB SERPL-CCNC: 1.96 NG/ML (ref 0–5)
CK MB SERPL-RTO: 5.9 % (ref 0–3)
CK SERPL-CCNC: 33 U/L (ref 24–173)
CO2 SERPL-SCNC: 25.5 MMOL/L (ref 24.3–31.9)
COHGB MFR BLD: 0.6 % (ref 0–5)
CREAT BLD-MCNC: 0.68 MG/DL (ref 0.43–1.29)
CRP SERPL-MCNC: 10.96 MG/DL (ref 0–0.99)
D-LACTATE SERPL-SCNC: 1.5 MMOL/L (ref 0.5–2)
DEPRECATED RDW RBC AUTO: 43.1 FL (ref 37–54)
EOSINOPHIL # BLD AUTO: 0.05 10*3/MM3 (ref 0–0.7)
EOSINOPHIL NFR BLD AUTO: 0.4 % (ref 0–7)
ERYTHROCYTE [DISTWIDTH] IN BLOOD BY AUTOMATED COUNT: 13.8 % (ref 11.5–14.5)
GAS FLOW AIRWAY: >50 LPM
GFR SERPL CREATININE-BSD FRML MDRD: 86 ML/MIN/1.73
GLOBULIN UR ELPH-MCNC: 3.1 GM/DL
GLUCOSE BLD-MCNC: 205 MG/DL (ref 70–110)
GLUCOSE BLDC GLUCOMTR-MCNC: 112 MG/DL (ref 70–130)
GLUCOSE BLDC GLUCOMTR-MCNC: 116 MG/DL (ref 70–130)
GLUCOSE BLDC GLUCOMTR-MCNC: 161 MG/DL (ref 70–130)
GLUCOSE BLDC GLUCOMTR-MCNC: 174 MG/DL (ref 70–130)
GLUCOSE BLDC GLUCOMTR-MCNC: 93 MG/DL (ref 70–130)
HCO3 BLDA-SCNC: 21.2 MMOL/L (ref 22–26)
HCT VFR BLD AUTO: 36.9 % (ref 37–47)
HCT VFR BLD CALC: 35 % (ref 37–47)
HDLC SERPL-MCNC: 34 MG/DL (ref 60–100)
HGB BLD-MCNC: 11.4 G/DL (ref 12–16)
HGB BLDA-MCNC: 11.8 G/DL (ref 12–16)
HOROWITZ INDEX BLD+IHG-RTO: 40 %
IMM GRANULOCYTES # BLD: 0.06 10*3/MM3 (ref 0–0.03)
IMM GRANULOCYTES NFR BLD: 0.5 % (ref 0–0.5)
INR PPP: 1.02 (ref 0.8–1.1)
LDLC SERPL CALC-MCNC: 50 MG/DL (ref 0–100)
LDLC/HDLC SERPL: 1.46 {RATIO}
LYMPHOCYTES # BLD AUTO: 1.46 10*3/MM3 (ref 1–3)
LYMPHOCYTES NFR BLD AUTO: 11.4 % (ref 16–46)
MAGNESIUM SERPL-MCNC: 2.6 MG/DL (ref 1.7–2.6)
MCH RBC QN AUTO: 27 PG (ref 27–33)
MCHC RBC AUTO-ENTMCNC: 30.9 G/DL (ref 33–37)
MCV RBC AUTO: 87.2 FL (ref 80–94)
METHGB BLD QL: 0.2 % (ref 0–3)
MODALITY: ABNORMAL
MONOCYTES # BLD AUTO: 0.87 10*3/MM3 (ref 0.1–0.9)
MONOCYTES NFR BLD AUTO: 6.8 % (ref 0–12)
MYOGLOBIN SERPL-MCNC: 47 NG/ML (ref 0–109)
NEUTROPHILS # BLD AUTO: 10.28 10*3/MM3 (ref 1.4–6.5)
NEUTROPHILS NFR BLD AUTO: 80.6 % (ref 40–75)
OSMOLALITY SERPL CALC.SUM OF ELEC: 291.3 MOSM/KG (ref 273–305)
OXYHGB MFR BLDV: 96.8 % (ref 85–100)
PCO2 BLDA: 36.9 MM HG (ref 35–45)
PEEP RESPIRATORY: 5 CM[H2O]
PH BLDA: 7.38 PH UNITS (ref 7.35–7.45)
PHOSPHATE SERPL-MCNC: 2.1 MG/DL (ref 2.7–4.5)
PLATELET # BLD AUTO: 203 10*3/MM3 (ref 130–400)
PMV BLD AUTO: 11.9 FL (ref 6–10)
PO2 BLDA: 98.4 MM HG (ref 80–100)
POTASSIUM BLD-SCNC: 3.9 MMOL/L (ref 3.5–5.3)
PROT SERPL-MCNC: 6.4 G/DL (ref 6–8)
PROTHROMBIN TIME: 11.3 SECONDS (ref 9.8–11.9)
RBC # BLD AUTO: 4.23 10*6/MM3 (ref 4.2–5.4)
SAO2 % BLDCOA: 97.6 % (ref 90–100)
SET MECH RESP RATE: 22
SODIUM BLD-SCNC: 142 MMOL/L (ref 135–153)
TOTAL RATE: 22 BREATHS/MINUTE
TRIGL SERPL-MCNC: 136 MG/DL (ref 0–150)
TROPONIN I SERPL-MCNC: 0.22 NG/ML
VENTILATOR MODE: ABNORMAL
VLDLC SERPL-MCNC: 27.2 MG/DL
VT ON VENT VENT: 400 ML
WBC NRBC COR # BLD: 12.76 10*3/MM3 (ref 4.5–12.5)

## 2017-06-01 PROCEDURE — 25010000002 CEFEPIME: Performed by: INTERNAL MEDICINE

## 2017-06-01 PROCEDURE — 25010000002 ENOXAPARIN PER 10 MG: Performed by: INTERNAL MEDICINE

## 2017-06-01 PROCEDURE — 82962 GLUCOSE BLOOD TEST: CPT

## 2017-06-01 PROCEDURE — 94799 UNLISTED PULMONARY SVC/PX: CPT

## 2017-06-01 PROCEDURE — 94002 VENT MGMT INPAT INIT DAY: CPT

## 2017-06-01 PROCEDURE — 84100 ASSAY OF PHOSPHORUS: CPT | Performed by: INTERNAL MEDICINE

## 2017-06-01 PROCEDURE — 85730 THROMBOPLASTIN TIME PARTIAL: CPT | Performed by: INTERNAL MEDICINE

## 2017-06-01 PROCEDURE — 82553 CREATINE MB FRACTION: CPT | Performed by: INTERNAL MEDICINE

## 2017-06-01 PROCEDURE — 71010 XR CHEST 1 VW: CPT | Performed by: RADIOLOGY

## 2017-06-01 PROCEDURE — 86140 C-REACTIVE PROTEIN: CPT | Performed by: INTERNAL MEDICINE

## 2017-06-01 PROCEDURE — 84484 ASSAY OF TROPONIN QUANT: CPT | Performed by: INTERNAL MEDICINE

## 2017-06-01 PROCEDURE — 94760 N-INVAS EAR/PLS OXIMETRY 1: CPT

## 2017-06-01 PROCEDURE — 99291 CRITICAL CARE FIRST HOUR: CPT | Performed by: INTERNAL MEDICINE

## 2017-06-01 PROCEDURE — 99233 SBSQ HOSP IP/OBS HIGH 50: CPT | Performed by: HOSPITALIST

## 2017-06-01 PROCEDURE — 71010 HC CHEST PA OR AP: CPT

## 2017-06-01 PROCEDURE — 83880 ASSAY OF NATRIURETIC PEPTIDE: CPT | Performed by: INTERNAL MEDICINE

## 2017-06-01 PROCEDURE — 82550 ASSAY OF CK (CPK): CPT | Performed by: INTERNAL MEDICINE

## 2017-06-01 PROCEDURE — 85610 PROTHROMBIN TIME: CPT | Performed by: INTERNAL MEDICINE

## 2017-06-01 PROCEDURE — 80061 LIPID PANEL: CPT | Performed by: INTERNAL MEDICINE

## 2017-06-01 PROCEDURE — 83605 ASSAY OF LACTIC ACID: CPT | Performed by: INTERNAL MEDICINE

## 2017-06-01 PROCEDURE — 25010000002 HEPARIN (PORCINE) PER 1000 UNITS: Performed by: INTERNAL MEDICINE

## 2017-06-01 PROCEDURE — 80053 COMPREHEN METABOLIC PANEL: CPT | Performed by: INTERNAL MEDICINE

## 2017-06-01 PROCEDURE — 25010000002 PROPOFOL 1000 MG/ML EMULSION: Performed by: INTERNAL MEDICINE

## 2017-06-01 PROCEDURE — 82805 BLOOD GASES W/O2 SATURATION: CPT | Performed by: NURSE PRACTITIONER

## 2017-06-01 PROCEDURE — 83050 HGB METHEMOGLOBIN QUAN: CPT | Performed by: NURSE PRACTITIONER

## 2017-06-01 PROCEDURE — 83735 ASSAY OF MAGNESIUM: CPT | Performed by: INTERNAL MEDICINE

## 2017-06-01 PROCEDURE — 36600 WITHDRAWAL OF ARTERIAL BLOOD: CPT | Performed by: NURSE PRACTITIONER

## 2017-06-01 PROCEDURE — 85025 COMPLETE CBC W/AUTO DIFF WBC: CPT | Performed by: INTERNAL MEDICINE

## 2017-06-01 PROCEDURE — 83874 ASSAY OF MYOGLOBIN: CPT | Performed by: INTERNAL MEDICINE

## 2017-06-01 PROCEDURE — 94003 VENT MGMT INPAT SUBQ DAY: CPT

## 2017-06-01 PROCEDURE — 25010000002 DOPAMINE PER 40 MG: Performed by: INTERNAL MEDICINE

## 2017-06-01 PROCEDURE — 82375 ASSAY CARBOXYHB QUANT: CPT | Performed by: NURSE PRACTITIONER

## 2017-06-01 PROCEDURE — 25010000002 VANCOMYCIN PER 500 MG: Performed by: INTERNAL MEDICINE

## 2017-06-01 RX ADMIN — CASTOR OIL AND BALSAM, PERU: 788; 87 OINTMENT TOPICAL at 20:32

## 2017-06-01 RX ADMIN — ASPIRIN 325 MG: 325 TABLET ORAL at 09:01

## 2017-06-01 RX ADMIN — DOPAMINE HYDROCHLORIDE 2.5 MCG/KG/MIN: 160 INJECTION, SOLUTION INTRAVENOUS at 16:54

## 2017-06-01 RX ADMIN — ATORVASTATIN CALCIUM 20 MG: 20 TABLET, FILM COATED ORAL at 20:32

## 2017-06-01 RX ADMIN — IPRATROPIUM BROMIDE AND ALBUTEROL SULFATE 3 ML: .5; 3 SOLUTION RESPIRATORY (INHALATION) at 00:30

## 2017-06-01 RX ADMIN — IPRATROPIUM BROMIDE AND ALBUTEROL SULFATE 3 ML: .5; 3 SOLUTION RESPIRATORY (INHALATION) at 06:16

## 2017-06-01 RX ADMIN — PROPOFOL 35 MCG/KG/MIN: 10 INJECTION, EMULSION INTRAVENOUS at 13:58

## 2017-06-01 RX ADMIN — HEPARIN SODIUM 13 UNITS/KG/HR: 10000 INJECTION, SOLUTION INTRAVENOUS at 13:53

## 2017-06-01 RX ADMIN — CASTOR OIL AND BALSAM, PERU: 788; 87 OINTMENT TOPICAL at 09:03

## 2017-06-01 RX ADMIN — METRONIDAZOLE 500 MG: 500 INJECTION, SOLUTION INTRAVENOUS at 09:01

## 2017-06-01 RX ADMIN — SODIUM CHLORIDE 100 ML/HR: 900 INJECTION, SOLUTION INTRAVENOUS at 13:56

## 2017-06-01 RX ADMIN — PROPOFOL 40 MCG/KG/MIN: 10 INJECTION, EMULSION INTRAVENOUS at 00:27

## 2017-06-01 RX ADMIN — METRONIDAZOLE 500 MG: 500 INJECTION, SOLUTION INTRAVENOUS at 23:39

## 2017-06-01 RX ADMIN — CEFEPIME 2 G: 2 INJECTION, POWDER, FOR SOLUTION INTRAVENOUS at 09:01

## 2017-06-01 RX ADMIN — DOXYCYCLINE 100 MG: 100 INJECTION, POWDER, LYOPHILIZED, FOR SOLUTION INTRAVENOUS at 00:26

## 2017-06-01 RX ADMIN — HEPARIN SODIUM 2500 UNITS: 5000 INJECTION, SOLUTION INTRAVENOUS; SUBCUTANEOUS at 14:09

## 2017-06-01 RX ADMIN — CEFEPIME 2 G: 2 INJECTION, POWDER, FOR SOLUTION INTRAVENOUS at 16:54

## 2017-06-01 RX ADMIN — ENOXAPARIN SODIUM 40 MG: 40 INJECTION SUBCUTANEOUS at 21:58

## 2017-06-01 RX ADMIN — VANCOMYCIN HYDROCHLORIDE 1250 MG: 5 INJECTION, POWDER, LYOPHILIZED, FOR SOLUTION INTRAVENOUS at 09:02

## 2017-06-01 RX ADMIN — PROPOFOL 50 MCG/KG/MIN: 10 INJECTION, EMULSION INTRAVENOUS at 20:32

## 2017-06-01 RX ADMIN — IPRATROPIUM BROMIDE AND ALBUTEROL SULFATE 3 ML: .5; 3 SOLUTION RESPIRATORY (INHALATION) at 18:38

## 2017-06-01 RX ADMIN — PROPOFOL 40 MCG/KG/MIN: 10 INJECTION, EMULSION INTRAVENOUS at 08:32

## 2017-06-01 RX ADMIN — PROPOFOL 50 MCG/KG/MIN: 10 INJECTION, EMULSION INTRAVENOUS at 23:43

## 2017-06-01 RX ADMIN — PROPOFOL 40 MCG/KG/MIN: 10 INJECTION, EMULSION INTRAVENOUS at 17:16

## 2017-06-01 RX ADMIN — Medication: at 09:03

## 2017-06-01 RX ADMIN — PROPOFOL 40 MCG/KG/MIN: 10 INJECTION, EMULSION INTRAVENOUS at 04:19

## 2017-06-01 RX ADMIN — CEFEPIME 2 G: 2 INJECTION, POWDER, FOR SOLUTION INTRAVENOUS at 23:38

## 2017-06-01 RX ADMIN — METRONIDAZOLE 500 MG: 500 INJECTION, SOLUTION INTRAVENOUS at 16:55

## 2017-06-01 NOTE — PROGRESS NOTES
LOS: 2 days   Patient Care Team:  ERASTO Lee as PCP - General (Family Medicine)    Chief Complaint:  Pulmonology is following for pneumonia and respiratory failure    Subjective     Interval History: patient is intubated and sedated.  No acute events reported overnight.      History taken from: chart RN Patient unable to give history due to patient intubated.    Review of Systems:   Review of Systems - History obtained from chart review and unobtainable from patient due to mental status and Intubated      Objective     Vital Signs  Temp:  [97.8 °F (36.6 °C)-98.9 °F (37.2 °C)] 97.8 °F (36.6 °C)  Heart Rate:  [] 63  Resp:  [17-23] 22  BP: (108-164)/(48-77) 129/58  FiO2 (%):  [40 %] 40 %  Body mass index is 35.12 kg/(m^2).    Intake/Output Summary (Last 24 hours) at 06/01/17 0956  Last data filed at 06/01/17 0901   Gross per 24 hour   Intake           5254.4 ml   Output             2755 ml   Net           2499.4 ml     I/O this shift:  In: 515 [I.V.:65; IV Piggyback:450]  Out: 125 [Urine:125]    Physical Exam:  GENERAL APPEARANCE: Intubated and sedated.  Appears to be resting comfortably in bed.     HEAD: normocephalic.    EYES: PERRLA.    THROAT: ET tube in place. Oral cavity and pharynx normal. No inflammation, swelling, exudate, or lesions.     NECK: Neck supple.     CARDIAC: Normal S1 and S2. No S3, S4 or murmurs. Rhythm is regular. There is no peripheral edema, cyanosis or pallor. Extremities are warm and well perfused. Capillary refill is less than 2 seconds. No carotid bruits.    LUNGS: Clear to auscultation and percussion without rales, rhonchi, wheezing or diminished breath sounds.    ABDOMEN: Positive bowel sounds. Soft, nondistended, nontender.     EXTREMITIES: No significant deformity or joint abnormality. No edema. Peripheral pulses intact.    NEUROLOGICAL: Unable to assess due to sedation status.     PSYCHIATRIC: Unable to assess due to sedation status.     Results Review:     I reviewed  the patient's new clinical results.  I reviewed the patient's new imaging results and agree with the interpretation.    Results from last 7 days  Lab Units 06/01/17 0527 05/31/17 0115 05/30/17  1649   WBC 10*3/mm3 12.76* 13.99* 18.80*   HEMOGLOBIN g/dL 11.4* 12.5 14.4   PLATELETS 10*3/mm3 203 204 298       Results from last 7 days  Lab Units 06/01/17 0527 05/31/17  0446 05/31/17  0115 05/30/17  1649   SODIUM mmol/L 142  --  141 138   POTASSIUM mmol/L 3.9  --  3.6 5.5*   CHLORIDE mmol/L 115*  --  108 104   TOTAL CO2 mmol/L 25.5  --  24.7 31.4   BUN mg/dL 19  --  20 13   CREATININE mg/dL 0.68  --  1.17 1.02   CALCIUM mg/dL 8.6  --  8.9 9.2   GLUCOSE mg/dL 205*  --  201* 308*   MAGNESIUM mg/dL 2.6 1.6*  --   --      Lab Results   Component Value Date    INR 1.02 06/01/2017    INR 1.03 05/31/2017    INR 0.98 05/31/2017    PROTIME 11.3 06/01/2017    PROTIME 11.5 05/31/2017    PROTIME 10.9 05/31/2017       Results from last 7 days  Lab Units 06/01/17 0527 05/31/17 0115 05/30/17  1649   ALK PHOS U/L 67 84 106*   BILIRUBIN mg/dL 0.2 0.4 0.5   ALT (SGPT) U/L 21 33 34   AST (SGOT) U/L 17 44* 54*       Results from last 7 days  Lab Units 06/01/17  0431   PH, ARTERIAL pH units 7.377   PO2 ART mm Hg 98.4   PCO2, ARTERIAL mm Hg 36.9   HCO3 ART mmol/L 21.2*     Imaging Results (last 24 hours)     Procedure Component Value Units Date/Time    XR Chest 1 View [[id number]] Collected:  06/01/17 0617     Updated:  06/01/17 0659    Narrative:       EXAMINATION: XR CHEST 1 VW-      CLINICAL INDICATION:     intubated; J96.01-Acute respiratory failure  with hypoxia; J96.02-Acute respiratory failure with hypercapnia;  J18.9-Pneumonia, unspecified organism     TECHNIQUE: Single AP view of chest.      COMPARISON: 5/30/2017      FINDINGS:   There is bibasilar atelectasis.   Worsened bilateral airspace disease.  Support tube and lines are in unchanged position.   Heart size is stable.  No pneumothorax.   Tiny bilateral pleural effusions  persist.        Impression:       Worsened bilateral airspace disease.     This report was finalized on 6/1/2017 6:17 AM by Dr. Bill Parham MD.                Medication Review:   Scheduled Medications:    aspirin 325 mg Oral Daily   atorvastatin 20 mg Oral Nightly   castor oil-balsam peru  Topical Q12H   cefepime 2 g Intravenous Q8H   ipratropium-albuterol 3 mL Nebulization 4x Daily - RT   metroNIDAZOLE 500 mg Intravenous Q8H   Pharmacy Meds to Bed Consult  Does not apply Daily   vancomycin 1,250 mg Intravenous Q18H     Continuous infusions:    dexmedetomidine 0.2-1.5 mcg/kg/hr Last Rate: Stopped (05/31/17 0830)   DOPamine 5 mcg/kg/min Last Rate: 3 mcg/kg/min (06/01/17 0500)   heparin (porcine) 9.35 Units/kg/hr (Adjusted) Last Rate: 13 Units/kg/hr (06/01/17 0619)   propofol 5-50 mcg/kg/min Last Rate: 40 mcg/kg/min (06/01/17 0832)   sodium chloride 100 mL/hr Last Rate: 100 mL/hr (05/31/17 2230)       Assessment/Plan     Neuro: Continue propofol  IV per protocol for sedation.  Will do sedation holiday today and breathing trial accordingly.    Respiratory: Patient intubated for respiratory failure likely due to pneumonia and history of smoking.    Vent settings:  RR 22 FiO2 40% PEEP 5    ABG is stable.  results reviewed  We'll place patient on a spontaneous breathing trial today.    The patient shows no improvement or becomes worse we will consider bronchoscopy tomorrow to send for specific cultures.    Continue IV antibiotics, IV steroids, scheduled inhalants and as needed neb treatment     If patient shows no improvement or becomes worse we'll consider bronchoscopy on Thursday or Friday to send for specific cultures.      Continue IV antibiotics, IV steroids, scheduled inhalants and as needed neb treatments.       ID: Continue IV antibiotics.    Cardiac:    Hemodynamically Stable.  Continuous ECG and pulse ox monitoring.       Renal: BUN/Creatinine WNL.  Strict I&Os.        Endocrine:monitor glucose  targeting 140-180.    Electrolytes:   Monitor levels, manage and replete per protocols.     GI: tube feedings are on hold for weaning trial.     Hematology:  Monitor blood counts, transfuse at or below 7 g/dL.  Unless patient is actively bleeding then began transfusing at 8 g/dL.       Patient Active Problem List   Diagnosis Code   • Pneumonia J18.9   • Sepsis due to pneumonia J18.9, A41.9         Bedside rounds were completed with RN and RRT, discussed case and plan in great detail.      ERASTO Mckeon  06/01/17  9:56 AM        Attestation: Scribed for Dr. Peter, ERASTO Bowden    I, Abdulkadir Peter M.D. attest that the above note accurately reflects the work and decisions made  by me.  Patient was seen and evaluated by Dr. Peter, including history of present illness, physical exam, assessment, and treatment plan.  The above note was reviewed and edited by Dr. Peter. The patient was  discussed with Miss Sim  and I agree with her history physical assessment and plan.Critical Care time spent in direct patient care: 33 minutes (excluding procedure time, if applicable) including high complexity decision making to assess, manipulate, and support vital organ system failure in this individual who has impairment of one or more vital organ systems such that there is a high probability of imminent or life threatening deterioration in the patient’s condition.

## 2017-06-01 NOTE — PROGRESS NOTES
"                                                                                                                                        Hospitalist Progress Note    Patient Identification  Name: Mayra Hays  Age/Sex: 71 y.o. female  :  1945        MRN: [id number]  Visit Number: [id number]  PCP: ERASTO Lee       LOS: 2 days       HPI: 72 yo female admitted  respiratory failure secondary to right middle and lower lobe pneumonia requiring intubation; also with Type II MI based on troponin elevation    Subjective    Patient remains intubated, sedated on propofol and precedex. Unable to give history. On vasopressor support with dopamine, BP improved.     Present during visit: JAMES Mcneal       Objective     Vital Signs  Temp:  [97.5 °F (36.4 °C)-98.9 °F (37.2 °C)] 98.4 °F (36.9 °C)  Heart Rate:  [44-94] 55  Resp:  [17-22] 22  BP: (105-164)/(48-77) 125/52  FiO2 (%):  [40 %] 40 %  Last 2 weights    17  1611 17   Weight: 230 lb (104 kg) 236 lb 3.2 oz (107 kg)     Body mass index is 35.91 kg/(m^2).    Intake/Output    Intake/Output Summary (Last 24 hours) at 17 0154  Last data filed at 17 0000   Gross per 24 hour   Intake          3466.44 ml   Output             2275 ml   Net          1191.44 ml       Physical Exam:      Objective:  General Appearance:  General patient appearance: Intubated, sedated.    Vital signs: (most recent): Blood pressure 125/52, pulse 55, temperature 98.4 °F (36.9 °C), temperature source Oral, resp. rate 22, height 68\" (172.7 cm), weight 236 lb 3.2 oz (107 kg), SpO2 97 %.    Output: Producing urine.    HEENT: (ET tube in place)    Lungs:  (Intubated, coarse breath sounds.)  Heart: Bradycardia.  Regular rhythm.  S1 normal and S2 normal.  No murmur.   Abdomen: Abdomen is soft.  Hypoactive bowel sounds.   There is no abdominal tenderness.     Extremities: There is dependent edema (Trace b/l lower ext edema).  There is no deformity.    Pulses: Distal " pulses are intact.    Neurological: (Sedated).    Skin:  Warm and dry.        TELEMETRY (which I personally reviewed) shows sinus bradycardia, T wave inversion with slight ST depression which RN reports was present yesterday at time of admission     Results Review:      LABS     Results from last 7 days  Lab Units 05/31/17  0115 05/30/17  1649   WBC 10*3/mm3 13.99* 18.80*   HEMOGLOBIN g/dL 12.5 14.4   PLATELETS 10*3/mm3 204 298       Results from last 7 days  Lab Units 05/31/17  0115   CRP mg/dL 5.64*       Results from last 7 days  Lab Units 05/31/17  0115 05/30/17  1649   SODIUM mmol/L 141 138   POTASSIUM mmol/L 3.6 5.5*   CHLORIDE mmol/L 108 104   TOTAL CO2 mmol/L 24.7 31.4   BUN mg/dL 20 13   CREATININE mg/dL 1.17 1.02   CALCIUM mg/dL 8.9 9.2   GLUCOSE mg/dL 201* 308*       Results from last 7 days  Lab Units 05/31/17  0446   MAGNESIUM mg/dL 1.6*     Hemoglobin A1C   Date Value Ref Range Status   05/31/2017 7.30 (H) 4.50 - 5.70 % Final       Results from last 7 days  Lab Units 05/31/17  0115 05/30/17  1649   BILIRUBIN mg/dL 0.4 0.5   ALK PHOS U/L 84 106*   AST (SGOT) U/L 44* 54*   ALT (SGPT) U/L 33 34       Results from last 7 days  Lab Units 05/31/17  1101 05/31/17  0446 05/30/17  2331   CK TOTAL U/L  --  45 51   TROPONIN I ng/mL 0.450* 0.743* 1.117*   CK MB INDEX %  --   --  5.6*   MYOGLOBIN ng/mL 91.0 75.0 98.0       Results from last 7 days  Lab Units 05/31/17  0115 05/30/17  1649   BNP pg/mL 306.0* 284.0*       Results from last 7 days  Lab Units 05/31/17  0446 05/31/17  0115 05/30/17  1649   INR  1.03 0.98 0.93       Results from last 7 days  Lab Units 05/31/17  0435   PH, ARTERIAL pH units 7.388   PO2 ART mm Hg 65.4*   PCO2, ARTERIAL mm Hg 33.5*   HCO3 ART mmol/L 19.7*         I have reviewed the patient's laboratory results.    IMAGING  Imaging Results (last 72 hours)     Procedure Component Value Units Date/Time    XR Chest 1 View [[id number]] Collected:  05/31/17 0654     Updated:  05/31/17 0656     Narrative:       EXAMINATION: XR CHEST 1 VW-      CLINICAL INDICATION:     shortness of air     TECHNIQUE:  XR CHEST 1 VW-      COMPARISON: 10/5/2016      FINDINGS:   ET TUBE BELOW THE LEVEL OF CLAVICLES. NG TUBE IN THE STOMACH. RIGHT  LOWER LOBE PNEUMONIA.  Heart and mediastinum contours are unremarkable.  No pleural effusion.  No pneumothorax.   Bony and soft tissue structures are unremarkable.       Impression:       ET TUBE BELOW THE LEVEL OF CLAVICLES. NG TUBE IN THE  STOMACH. RIGHT LOWER LOBE PNEUMONIA.     This report was finalized on 5/31/2017 6:54 AM by Dr. Bill Parham MD.             I have personally reviewed the above radiologic imaging.    Medications    aspirin 325 mg Oral Daily   atorvastatin 20 mg Oral Nightly   castor oil-balsam peru  Topical Q12H   cefepime 2 g Intravenous Q8H   doxycycline 100 mg Intravenous Q12H   ipratropium-albuterol 3 mL Nebulization 4x Daily - RT   metroNIDAZOLE 500 mg Intravenous Q8H   Pharmacy Meds to Bed Consult  Does not apply Daily   vancomycin 1,250 mg Intravenous Q18H       dexmedetomidine 0.2-1.5 mcg/kg/hr Last Rate: Stopped (05/31/17 0830)   DOPamine 5 mcg/kg/min Last Rate: 4 mcg/kg/min (06/01/17 0027)   heparin (porcine) 9.35 Units/kg/hr (Adjusted) Last Rate: 15 Units/kg/hr (05/31/17 2330)   propofol 5-50 mcg/kg/min Last Rate: 40 mcg/kg/min (06/01/17 0027)   sodium chloride 100 mL/hr Last Rate: 100 mL/hr (05/31/17 2230)            Assessment/Plan     Active Problems:  -Acute hypoxic and hypercapnic respiratory failure and sepsis due to right middle lobe and right lower lobe pneumonia  -Primary respiratory acidosis and a secondary compensatory metabolic alkalosis  -1 of 2 blood cultures with gram positive cocci on stain, repeat blood cultures drawn  -Lactic acidosis  -Prolonged QTc of 559 ms  -Sinus bradycardia  -Elevated troponins concerning for type 2 myocardial infarction/NSTEMI  -Hyperglycemia, no history of diabetes mellitus but A1c of 7.3% indicates new  diagnosis  -Acute COPD exacerbation   -Elevated liver enzymes  -Morbid obesity  -Chronic kidney disease Stage 3 (baseline Cr 0.94-1.02)  -Mild hyperkalemia that has improved  -Past heavy tobacco smoking addiction  -Nutrition, on tube feeds    Plan:  Continue broad spectrum IV antibiotics including vanc, cefepime and flagyl. ID consulted in light of pneumonia and possible bacteremia although could ending up being simply contamination as only 1 of 2 blood culture stains were positive. BG stable at this time so will hold off on SSI coverage but continue to monitor accuchecks. Pulmonology following for vent management. Cardiology following for NSTEMI. Continue heparin drip and dopamine drip per Dr Mae's recommendation. Continue IV fluid hydration, monitor for signs/symptoms of developing fluid overload. EF 55-60% on ECHO but also evidence of grade II diastolic dysfunction.    Proph: heparin drip    I have discussed the patient's assessment and plan with the patient's RN Elizabet.    * Patient is high risk due to respiratory failure and sepsis secondary to pneumonia and COPD exacerbation, possible bacteremia, Type II MI, Stage III CKD, grade II diastolic dysfunction    Serafin Prabhakar MD  06/01/17  1:54 AM

## 2017-06-01 NOTE — PLAN OF CARE
Problem: Mechanical Ventilation, Invasive (Adult)  Goal: Signs and Symptoms of Listed Potential Problems Will be Absent or Manageable (Mechanical Ventilation, Invasive)  Outcome: Ongoing (interventions implemented as appropriate)    06/01/17 0429   Mechanical Ventilation, Invasive   Problems Assessed (Mechanical Ventilation, Invasive) all

## 2017-06-01 NOTE — PLAN OF CARE
Problem: Mechanical Ventilation, Invasive (Adult)  Goal: Signs and Symptoms of Listed Potential Problems Will be Absent or Manageable (Mechanical Ventilation, Invasive)  Outcome: Ongoing (interventions implemented as appropriate)    06/01/17 1321   Mechanical Ventilation, Invasive   Problems Assessed (Mechanical Ventilation, Invasive) all   Problems Present (Mechanical Ventilation, Invasive) immobility;situational response         Problem: Sepsis (Adult)  Goal: Signs and Symptoms of Listed Potential Problems Will be Absent or Manageable (Sepsis)  Outcome: Ongoing (interventions implemented as appropriate)    06/01/17 1320   Sepsis   Problems Assessed (Sepsis) all   Problems Present (Sepsis) situational response         Problem: Skin Integrity Impairment, Risk/Actual (Adult)  Goal: Identify Related Risk Factors and Signs and Symptoms  Outcome: Ongoing (interventions implemented as appropriate)    06/01/17 1320   Skin Integrity Impairment, Risk/Actual   Skin Integrity Impairment, Risk/Actual: Related Risk Factors age extremes;cognitive impairment;sensory impairment;immobility;mechanical factors;infection/disease process   Signs and Symptoms (Skin Integrity Impairment) erythema nonblanchable  (coccyx)       Goal: Skin Integrity/Wound Healing  Outcome: Ongoing (interventions implemented as appropriate)    06/01/17 1320   Skin Integrity Impairment, Risk/Actual (Adult)   Skin Integrity/Wound Healing making progress toward outcome         Problem: Pneumonia (Adult)  Goal: Signs and Symptoms of Listed Potential Problems Will be Absent or Manageable (Pneumonia)  Outcome: Ongoing (interventions implemented as appropriate)    06/01/17 1320   Pneumonia   Problems Assessed (Pneumonia) all   Problems Present (Pneumonia) respiratory compromise         Problem: Patient Care Overview (Adult)  Goal: Plan of Care Review  Outcome: Ongoing (interventions implemented as appropriate)    06/01/17 1319   Coping/Psychosocial Response  Interventions   Plan Of Care Reviewed With patient   Patient Care Overview   Progress progress toward functional goals as expected       Goal: Adult Individualization and Mutuality  Outcome: Ongoing (interventions implemented as appropriate)  Goal: Discharge Needs Assessment  Outcome: Ongoing (interventions implemented as appropriate)    05/31/17 1403   Discharge Needs Assessment   Concerns To Be Addressed no discharge needs identified   Readmission Within The Last 30 Days no previous admission in last 30 days   Equipment Needed After Discharge none   Discharge Disposition still a patient   Current Health   Outpatient/Agency/Support Group Needs (Pt does not utilize home health services at this time. )   Anticipated Changes Related to Illness none   Living Environment   Transportation Available car;family or friend will provide   Self-Care   Equipment Currently Used at Home oxygen  ([employer])         Problem: Fall Risk (Adult)  Goal: Identify Related Risk Factors and Signs and Symptoms  Outcome: Ongoing (interventions implemented as appropriate)    06/01/17 1318   Fall Risk   Fall Risk: Related Risk Factors confusion/agitation;fatigue/slow reaction   Fall Risk: Signs and Symptoms presence of risk factors       Goal: Absence of Falls  Outcome: Ongoing (interventions implemented as appropriate)    06/01/17 1318   Fall Risk (Adult)   Absence of Falls making progress toward outcome         Problem: Pressure Ulcer (Adult)  Goal: Signs and Symptoms of Listed Potential Problems Will be Absent or Manageable (Pressure Ulcer)  Outcome: Ongoing (interventions implemented as appropriate)    06/01/17 1317   Pressure Ulcer   Problems Assessed (Pressure Ulcer) all   Problems Present (Pressure Ulcer) other (see comments)  (stage II present admission)

## 2017-06-01 NOTE — CONSULTS
"  INFECTIOUS DISEASE CONSULTATION REPORT      Referring Provider: Dr. Hoffmann  Reason for Consultation: Positive blood culture, pneumonia      Principal problem: <principal problem not specified>    Subjective .     History of present illness:    As you well know Dr. Hoffmann, Ms. Mayra Hays is a 71 y.o. years old female who was admitted on 5 from 2017 with respiratory failure secondary to right middle and lower lobe pneumonia requiring intubation as well as a suspected MI.  Chest x-ray reveals worsening bilateral airspace disease.  CRP level is elevated with leukocytosis.  Normal lactic acid.  Negative mycoplasma IgM and negative Legionella urinary antigen.  Fever on admission but resolved now.  One positive blood culture on 5/30/2017 out of 2 showing gram-positive cocci consistent with staph.  Repeat blood cultures showing no growth so far.    Infectious Disease consultation was requested for antimicrobial management.     History taken from: chart RN    Case was discussed with nursing staff    Review of Systems-unable to obtain as the patient is sedated, intubated on ventilator    Past Medical History    Past Medical History:   Diagnosis Date   • GERD (gastroesophageal reflux disease)    • Hyperlipidemia    • Hypertension        Past Surgical History    History reviewed. No pertinent surgical history.    Family History    History reviewed. No pertinent family history.    Social History    Social History   Substance Use Topics   • Smoking status: Former Smoker   • Smokeless tobacco: None   • Alcohol use None       Allergies    Review of patient's allergies indicates no known allergies.    Objective     /58  Pulse 63  Temp 97.8 °F (36.6 °C)  Resp 22  Ht 68\" (172.7 cm)  Wt 231 lb (105 kg)  SpO2 99%  BMI 35.12 kg/m2    Temp:  [97.8 °F (36.6 °C)-98.9 °F (37.2 °C)] 97.8 °F (36.6 °C)        Intake/Output Summary (Last 24 hours) at 06/01/17 0931  Last data filed at 06/01/17 0901   Gross per 24 hour   Intake "           5154.4 ml   Output             2755 ml   Net           2399.4 ml         Physical Exam:     General Appearance:    Intubated, sedated, face is red    Head:    Normocephalic, without obvious abnormality, atraumatic   Eyes:            Lids and lashes normal, conjunctivae and sclerae normal, no   icterus, no pallor, corneas clear, PERRLA   Ears:    Ears appear intact with no abnormalities noted   Throat:   No oral lesions, no thrush, oral mucosa moist   Neck:   No adenopathy, supple, trachea midline, no thyromegaly, no   carotid bruit, no JVD   Back:     No tenderness to percussion or palpation, range of motion   normal   Lungs:     Wheezing bilaterally on inspiration     Heart:    Regular rhythm and normal rate, normal S1 and S2, no            murmur, no gallop, no rub, no click   Chest Wall:    No abnormalities observed   Abdomen:     Distended, Normal bowel sounds, no masses, no organomegaly, non-tender, no guarding, no rebound                tenderness   Rectal:     Deferred   Extremities:   Moves all extremities well, no edema, no cyanosis, no             redness   Pulses:   Pulses palpable and equal bilaterally   Skin:   No bleeding, bruising or rash   Lymph nodes:   No palpable adenopathy   Neurologic:   Sedated        Results:      Results from last 7 days  Lab Units 06/01/17 0527 05/31/17  0115 05/30/17  1649   WBC 10*3/mm3 12.76* 13.99* 18.80*     Lab Results   Component Value Date    NEUTROABS 10.28 (H) 06/01/2017         Results from last 7 days  Lab Units 06/01/17 0527   CREATININE mg/dL 0.68         Results from last 7 days  Lab Units 06/01/17 0527 05/31/17  0115   CRP mg/dL 10.96* 5.64*       Imaging Results (last 24 hours)     Procedure Component Value Units Date/Time    XR Chest 1 View [[id number]] Collected:  06/01/17 0617     Updated:  06/01/17 0659    Narrative:       EXAMINATION: XR CHEST 1 VW-      CLINICAL INDICATION:     intubated; J96.01-Acute respiratory failure  with hypoxia;  J96.02-Acute respiratory failure with hypercapnia;  J18.9-Pneumonia, unspecified organism     TECHNIQUE: Single AP view of chest.      COMPARISON: 5/30/2017      FINDINGS:   There is bibasilar atelectasis.   Worsened bilateral airspace disease.  Support tube and lines are in unchanged position.   Heart size is stable.  No pneumothorax.   Tiny bilateral pleural effusions persist.        Impression:       Worsened bilateral airspace disease.     This report was finalized on 6/1/2017 6:17 AM by Dr. Bill Parham MD.               Cultures:    Blood Culture   Date Value Ref Range Status   05/31/2017 No growth at less than 24 hours  Preliminary   05/31/2017 No growth at less than 24 hours  Preliminary   05/30/2017 No growth at 24 hours  Preliminary   05/30/2017 Gram positive cocci, consistent with Staph spp (A)  Preliminary       Results Review:    I have personally reviewed laboratory data, culture results, radiology studies and antimicrobial therapy.    Hospital Medications (active)       Dose Frequency Start End    aspirin tablet 325 mg 325 mg Daily 5/31/2017     Sig - Route: Take 1 tablet by mouth Daily. - Oral    atorvastatin (LIPITOR) tablet 20 mg 20 mg Nightly 5/31/2017     Sig - Route: Take 1 tablet by mouth Every Night. - Oral    castor oil-balsam peru (VENELEX) ointment  Every 12 Hours Scheduled 5/31/2017     Sig - Route: Apply  topically Every 12 (Twelve) Hours. - Topical    cefepime (MAXIPIME) 2 g/100 mL 0.9% NS (mbp) 2 g Every 8 Hours Scheduled 5/31/2017     Sig - Route: Infuse 100 mL into a venous catheter Every 8 (Eight) Hours. - Intravenous    dexmedetomidine HCl (PRECEDEX) 400 mcg in sodium chloride 0.9 % 100 mL (4 mcg/mL) infusion 0.2-1.5 mcg/kg/hr × 107 kg Titrated 5/31/2017     Sig - Route: Infuse 21.4-160.5 mcg/hr into a venous catheter Dose Adjusted By Provider As Needed. - Intravenous    DOPamine (INTROPIN) 1.6-5 MG/ML-% infusion  - ADS Override Pull   5/31/2017 5/31/2017    Notes to Pharmacy:  "Created by cabinet override    DOPamine 400 mg/250 mL (1.6 mg/mL) infusion 5 mcg/kg/min × 107 kg Titrated 5/31/2017     Sig - Route: Infuse 535 mcg/min into a venous catheter Dose Adjusted By Provider As Needed. - Intravenous    doxycycline (VIBRAMYCIN) 100 mg/100 mL 0.9% NS  mg Every 12 Hours 5/31/2017     Sig - Route: Infuse 100 mL into a venous catheter Every 12 (Twelve) Hours. - Intravenous    heparin (porcine) 5000 UNIT/ML injection 2,500 Units 23.4 Units/kg × 107 kg As Needed 5/31/2017     Sig - Route: Infuse 0.5 mL into a venous catheter As Needed (Per Heparin Nomogram For PTT 45-55). - Intravenous    heparin (porcine) 5000 UNIT/ML injection 5,000 Units 46.7 Units/kg × 107 kg As Needed 5/31/2017     Sig - Route: Infuse 1 mL into a venous catheter As Needed (Per Heparin Nomogram For PTT Less Than 45). - Intravenous    heparin 25121 units/250 mL (100 units/mL) in 0.45 % NaCl infusion 9.35 Units/kg/hr × 81.1 kg (Adjusted) Titrated 5/31/2017     Sig - Route: Infuse 758.285 Units/hr into a venous catheter Dose Adjusted By Provider As Needed. - Intravenous    ipratropium-albuterol (DUO-NEB) nebulizer solution 3 mL 3 mL 4 Times Daily - RT 5/31/2017     Sig - Route: Take 3 mL by nebulization 4 (Four) Times a Day. - Nebulization    Magnesium Sulfate 2 gram Bolus, followed by 8 gram infusion (total Mg dose 10 grams)- Mg less than or equal to 1mg/dL 2 g As Needed 5/31/2017     Sig - Route: Infuse 50 mL into a venous catheter As Needed (Mg less than or equal to 1mg/dL). - Intravenous    Linked Group 1:  \"Or\" Linked Group Details        magnesium sulfate 4 gram infusion- Mg 1.6-1.9 mg/dL 4 g As Needed 5/31/2017     Sig - Route: Infuse 100 mL into a venous catheter As Needed (Mg 1.6-1.9 mg/dL). - Intravenous    Linked Group 1:  \"Or\" Linked Group Details        magnesium sulfate 4 gram infusion- Mg 1.6-1.9 mg/dL 4 g Once 5/31/2017 5/31/2017    Sig - Route: Infuse 100 mL into a venous catheter 1 (One) Time. - " "Intravenous    Magnesium Sulfate 6 gram Infusion (2 gm x 3) -Mg 1.1 -1.5 mg/dL 2 g As Needed 5/31/2017     Sig - Route: Infuse 50 mL into a venous catheter As Needed (Mg 1.1 -1.5 mg/dL). - Intravenous    Linked Group 1:  \"Or\" Linked Group Details        metroNIDAZOLE (FLAGYL) IVPB 500 mg 500 mg Every 8 Hours Scheduled 5/31/2017     Sig - Route: Infuse 100 mL into a venous catheter Every 8 (Eight) Hours. - Intravenous    Pharmacy Meds to Bed Consult  Daily 5/31/2017     Sig - Route: Daily. - Does not apply    potassium chloride (K-DUR,KLOR-CON) CR tablet 40 mEq 40 mEq Every 4 Hours 5/31/2017 5/31/2017    Sig - Route: Take 2 tablets by mouth Every 4 (Four) Hours. - Oral    potassium chloride (KLOR-CON) packet 40 mEq 40 mEq As Needed 5/31/2017     Sig - Route: Take 40 mEq by mouth As Needed (potassium replacement, see admin instructions). - Oral    Linked Group 2:  \"Or\" Linked Group Details        potassium chloride (MICRO-K) CR capsule 40 mEq 40 mEq As Needed 5/31/2017     Sig - Route: Take 4 capsules by mouth As Needed (potassium replacement.  see admin instructions). - Oral    Linked Group 2:  \"Or\" Linked Group Details        potassium chloride 10 mEq in 100 mL IVPB 10 mEq Every 1 Hour PRN 5/31/2017     Sig - Route: Infuse 100 mL into a venous catheter Every 1 (One) Hour As Needed (potassium protocol PERIPHERAL - see admin instructions). - Intravenous    Linked Group 2:  \"Or\" Linked Group Details        propofol (DIPRIVAN) infusion 10 mg/mL 100 mL 5-50 mcg/kg/min × 107 kg Titrated 5/31/2017     Sig - Route: Infuse 535-5,350 mcg/min into a venous catheter Dose Adjusted By Provider As Needed. - Intravenous    Notes to Pharmacy: Order received from Dr Peter to start diprivan per protocol if patient remains bradycardic on precedex IV    sodium chloride 0.9 % flush 1-10 mL 1-10 mL As Needed 5/30/2017     Sig - Route: Infuse 1-10 mL into a venous catheter As Needed for Line Care. - Intravenous    sodium chloride 0.9 % " infusion 100 mL/hr Continuous 5/30/2017     Sig - Route: Infuse 100 mL/hr into a venous catheter Continuous. - Intravenous    vancomycin (VANCOCIN) 1,250 mg in sodium chloride 0.9 % 250 mL IVPB 1,250 mg Every 18 Hours 6/1/2017     Sig - Route: Infuse 1,250 mg into a venous catheter Every 18 (Eighteen) Hours. - Intravenous    vancomycin (VANCOCIN) 1,500 mg in sodium chloride 0.9 % 500 mL IVPB 1,500 mg Once 5/31/2017 5/31/2017    Sig - Route: Infuse 1,500 mg into a venous catheter 1 (One) Time. - Intravenous    Pharmacy to dose vancomycin (Discontinued)  Continuous PRN 5/31/2017 5/31/2017    Sig - Route: Continuous As Needed for Consult. - Does not apply    Reason for Discontinue: Allergic response            PROBLEM LIST:    Patient Active Problem List   Diagnosis   • Pneumonia   • Sepsis due to pneumonia       Assessment/Plan     ASSESSMENT:    1.  Sepsis  2.  Staph bacteremia  3.  Pneumonia    PLAN:    At this time would recommend to continue broad-spectrum coverage with vancomycin, cefepime, and Flagyl until culture results are finalized.  Doxycycline was discontinued.  The positive blood culture one set out of 2 could be a contaminant and we will de-escalate when cultures have finalized.  Respiratory culture ordered to help de-escalate antibiotic coverage.  CRP level ordered for a.m.      Patients findings and recommendations were discussed with nursing staff    Code Status: Full Code    Rosalia Pinto PA-C  06/01/17  9:31 AM

## 2017-06-01 NOTE — PLAN OF CARE
Problem: Sepsis (Adult)  Goal: Signs and Symptoms of Listed Potential Problems Will be Absent or Manageable (Sepsis)  Outcome: Ongoing (interventions implemented as appropriate)    Problem: Skin Integrity Impairment, Risk/Actual (Adult)  Goal: Identify Related Risk Factors and Signs and Symptoms  Outcome: Ongoing (interventions implemented as appropriate)  Goal: Skin Integrity/Wound Healing  Outcome: Ongoing (interventions implemented as appropriate)

## 2017-06-02 LAB
A-A DO2: 144 MMHG (ref 0–300)
ALBUMIN SERPL-MCNC: 2.7 G/DL (ref 3.4–4.8)
ALBUMIN/GLOB SERPL: 0.8 G/DL (ref 1.5–2.5)
ALP SERPL-CCNC: 60 U/L (ref 35–104)
ALT SERPL W P-5'-P-CCNC: 14 U/L (ref 10–36)
ANION GAP SERPL CALCULATED.3IONS-SCNC: 1.4 MMOL/L (ref 3.6–11.2)
ARTERIAL PATENCY WRIST A: ABNORMAL
AST SERPL-CCNC: 14 U/L (ref 10–30)
ATMOSPHERIC PRESS: 729 MMHG
BASE EXCESS BLDA CALC-SCNC: -3.1 MMOL/L
BASOPHILS # BLD AUTO: 0.05 10*3/MM3 (ref 0–0.3)
BASOPHILS NFR BLD AUTO: 0.4 % (ref 0–2)
BDY SITE: ABNORMAL
BILIRUB SERPL-MCNC: 0.2 MG/DL (ref 0.2–1.8)
BODY TEMPERATURE: 98.6 C
BUN BLD-MCNC: 16 MG/DL (ref 7–21)
BUN/CREAT SERPL: 25.8 (ref 7–25)
CALCIUM SPEC-SCNC: 8.6 MG/DL (ref 7.7–10)
CHLORIDE SERPL-SCNC: 118 MMOL/L (ref 99–112)
CK MB SERPL-CCNC: 0.31 NG/ML (ref 0–5)
CK MB SERPL-RTO: 1.4 % (ref 0–3)
CK SERPL-CCNC: 22 U/L (ref 24–173)
CO2 SERPL-SCNC: 23.6 MMOL/L (ref 24.3–31.9)
COHGB MFR BLD: 1.3 % (ref 0–5)
CREAT BLD-MCNC: 0.62 MG/DL (ref 0.43–1.29)
CRP SERPL-MCNC: 9.33 MG/DL (ref 0–0.99)
DEPRECATED RDW RBC AUTO: 45.1 FL (ref 37–54)
EOSINOPHIL # BLD AUTO: 0.17 10*3/MM3 (ref 0–0.7)
EOSINOPHIL NFR BLD AUTO: 1.5 % (ref 0–7)
ERYTHROCYTE [DISTWIDTH] IN BLOOD BY AUTOMATED COUNT: 14.2 % (ref 11.5–14.5)
GFR SERPL CREATININE-BSD FRML MDRD: 95 ML/MIN/1.73
GLOBULIN UR ELPH-MCNC: 3.2 GM/DL
GLUCOSE BLD-MCNC: 111 MG/DL (ref 70–110)
GLUCOSE BLDC GLUCOMTR-MCNC: 111 MG/DL (ref 70–130)
GLUCOSE BLDC GLUCOMTR-MCNC: 69 MG/DL (ref 70–130)
GLUCOSE BLDC GLUCOMTR-MCNC: 98 MG/DL (ref 70–130)
HCO3 BLDA-SCNC: 22.1 MMOL/L (ref 22–26)
HCT VFR BLD AUTO: 35.1 % (ref 37–47)
HCT VFR BLD CALC: 31 % (ref 37–47)
HGB BLD-MCNC: 10.7 G/DL (ref 12–16)
HGB BLDA-MCNC: 10.7 G/DL (ref 12–16)
HOROWITZ INDEX BLD+IHG-RTO: 40 %
IMM GRANULOCYTES # BLD: 0.12 10*3/MM3 (ref 0–0.03)
IMM GRANULOCYTES NFR BLD: 1 % (ref 0–0.5)
LYMPHOCYTES # BLD AUTO: 1.62 10*3/MM3 (ref 1–3)
LYMPHOCYTES NFR BLD AUTO: 14.1 % (ref 16–46)
MAGNESIUM SERPL-MCNC: 2.4 MG/DL (ref 1.7–2.6)
MCH RBC QN AUTO: 26.8 PG (ref 27–33)
MCHC RBC AUTO-ENTMCNC: 30.5 G/DL (ref 33–37)
MCV RBC AUTO: 88 FL (ref 80–94)
METHGB BLD QL: 0.3 % (ref 0–3)
MODALITY: ABNORMAL
MONOCYTES # BLD AUTO: 0.97 10*3/MM3 (ref 0.1–0.9)
MONOCYTES NFR BLD AUTO: 8.4 % (ref 0–12)
NEUTROPHILS # BLD AUTO: 8.55 10*3/MM3 (ref 1.4–6.5)
NEUTROPHILS NFR BLD AUTO: 74.6 % (ref 40–75)
OSMOLALITY SERPL CALC.SUM OF ELEC: 286.9 MOSM/KG (ref 273–305)
OXYHGB MFR BLDV: 94.5 % (ref 85–100)
PCO2 BLDA: 40 MM HG (ref 35–45)
PEEP RESPIRATORY: 5 CM[H2O]
PH BLDA: 7.36 PH UNITS (ref 7.35–7.45)
PHOSPHATE SERPL-MCNC: 3.1 MG/DL (ref 2.7–4.5)
PLATELET # BLD AUTO: 194 10*3/MM3 (ref 130–400)
PMV BLD AUTO: 12 FL (ref 6–10)
PO2 BLDA: 82.8 MM HG (ref 80–100)
POTASSIUM BLD-SCNC: 4 MMOL/L (ref 3.5–5.3)
PROT SERPL-MCNC: 5.9 G/DL (ref 6–8)
RBC # BLD AUTO: 3.99 10*6/MM3 (ref 4.2–5.4)
SAO2 % BLDCOA: 96 % (ref 90–100)
SET MECH RESP RATE: 22
SODIUM BLD-SCNC: 143 MMOL/L (ref 135–153)
TROPONIN I SERPL-MCNC: 0.13 NG/ML
TROPONIN I SERPL-MCNC: 0.14 NG/ML
VANCOMYCIN TROUGH SERPL-MCNC: 8.9 MCG/ML (ref 5–15)
VENTILATOR MODE: ABNORMAL
VT ON VENT VENT: 400 ML
WBC NRBC COR # BLD: 11.48 10*3/MM3 (ref 4.5–12.5)

## 2017-06-02 PROCEDURE — 87206 SMEAR FLUORESCENT/ACID STAI: CPT | Performed by: INTERNAL MEDICINE

## 2017-06-02 PROCEDURE — 87102 FUNGUS ISOLATION CULTURE: CPT | Performed by: INTERNAL MEDICINE

## 2017-06-02 PROCEDURE — 25010000002 PROPOFOL 1000 MG/ML EMULSION: Performed by: INTERNAL MEDICINE

## 2017-06-02 PROCEDURE — 25010000002 VANCOMYCIN PER 500 MG: Performed by: INTERNAL MEDICINE

## 2017-06-02 PROCEDURE — 82550 ASSAY OF CK (CPK): CPT | Performed by: INTERNAL MEDICINE

## 2017-06-02 PROCEDURE — 83050 HGB METHEMOGLOBIN QUAN: CPT | Performed by: NURSE PRACTITIONER

## 2017-06-02 PROCEDURE — 93010 ELECTROCARDIOGRAM REPORT: CPT | Performed by: INTERNAL MEDICINE

## 2017-06-02 PROCEDURE — 85025 COMPLETE CBC W/AUTO DIFF WBC: CPT | Performed by: HOSPITALIST

## 2017-06-02 PROCEDURE — 94799 UNLISTED PULMONARY SVC/PX: CPT

## 2017-06-02 PROCEDURE — 87116 MYCOBACTERIA CULTURE: CPT | Performed by: INTERNAL MEDICINE

## 2017-06-02 PROCEDURE — 99291 CRITICAL CARE FIRST HOUR: CPT | Performed by: INTERNAL MEDICINE

## 2017-06-02 PROCEDURE — 99233 SBSQ HOSP IP/OBS HIGH 50: CPT | Performed by: INTERNAL MEDICINE

## 2017-06-02 PROCEDURE — 86140 C-REACTIVE PROTEIN: CPT | Performed by: HOSPITALIST

## 2017-06-02 PROCEDURE — 93005 ELECTROCARDIOGRAM TRACING: CPT | Performed by: INTERNAL MEDICINE

## 2017-06-02 PROCEDURE — 0B9F8ZX DRAINAGE OF RIGHT LOWER LUNG LOBE, VIA NATURAL OR ARTIFICIAL OPENING ENDOSCOPIC, DIAGNOSTIC: ICD-10-PCS | Performed by: INTERNAL MEDICINE

## 2017-06-02 PROCEDURE — 94003 VENT MGMT INPAT SUBQ DAY: CPT

## 2017-06-02 PROCEDURE — 83735 ASSAY OF MAGNESIUM: CPT | Performed by: NURSE PRACTITIONER

## 2017-06-02 PROCEDURE — 80202 ASSAY OF VANCOMYCIN: CPT | Performed by: INTERNAL MEDICINE

## 2017-06-02 PROCEDURE — 80053 COMPREHEN METABOLIC PANEL: CPT | Performed by: HOSPITALIST

## 2017-06-02 PROCEDURE — 87070 CULTURE OTHR SPECIMN AEROBIC: CPT | Performed by: INTERNAL MEDICINE

## 2017-06-02 PROCEDURE — 25010000002 FUROSEMIDE PER 20 MG: Performed by: INTERNAL MEDICINE

## 2017-06-02 PROCEDURE — 0B9J8ZX DRAINAGE OF LEFT LOWER LUNG LOBE, VIA NATURAL OR ARTIFICIAL OPENING ENDOSCOPIC, DIAGNOSTIC: ICD-10-PCS | Performed by: INTERNAL MEDICINE

## 2017-06-02 PROCEDURE — 02HV33Z INSERTION OF INFUSION DEVICE INTO SUPERIOR VENA CAVA, PERCUTANEOUS APPROACH: ICD-10-PCS | Performed by: INTERNAL MEDICINE

## 2017-06-02 PROCEDURE — 82805 BLOOD GASES W/O2 SATURATION: CPT | Performed by: NURSE PRACTITIONER

## 2017-06-02 PROCEDURE — 36600 WITHDRAWAL OF ARTERIAL BLOOD: CPT | Performed by: NURSE PRACTITIONER

## 2017-06-02 PROCEDURE — 84100 ASSAY OF PHOSPHORUS: CPT | Performed by: NURSE PRACTITIONER

## 2017-06-02 PROCEDURE — 82375 ASSAY CARBOXYHB QUANT: CPT | Performed by: NURSE PRACTITIONER

## 2017-06-02 PROCEDURE — 82962 GLUCOSE BLOOD TEST: CPT

## 2017-06-02 PROCEDURE — 25010000002 ENOXAPARIN PER 10 MG: Performed by: INTERNAL MEDICINE

## 2017-06-02 PROCEDURE — C1751 CATH, INF, PER/CENT/MIDLINE: HCPCS

## 2017-06-02 PROCEDURE — 94760 N-INVAS EAR/PLS OXIMETRY 1: CPT

## 2017-06-02 PROCEDURE — 25010000002 MIDAZOLAM PER 1 MG

## 2017-06-02 PROCEDURE — 82553 CREATINE MB FRACTION: CPT | Performed by: INTERNAL MEDICINE

## 2017-06-02 PROCEDURE — 84484 ASSAY OF TROPONIN QUANT: CPT | Performed by: INTERNAL MEDICINE

## 2017-06-02 PROCEDURE — 0B9D8ZX DRAINAGE OF RIGHT MIDDLE LUNG LOBE, VIA NATURAL OR ARTIFICIAL OPENING ENDOSCOPIC, DIAGNOSTIC: ICD-10-PCS | Performed by: INTERNAL MEDICINE

## 2017-06-02 PROCEDURE — 31624 DX BRONCHOSCOPE/LAVAGE: CPT | Performed by: INTERNAL MEDICINE

## 2017-06-02 PROCEDURE — 87205 SMEAR GRAM STAIN: CPT | Performed by: INTERNAL MEDICINE

## 2017-06-02 PROCEDURE — 25010000002 CEFEPIME: Performed by: INTERNAL MEDICINE

## 2017-06-02 RX ORDER — MIDAZOLAM HYDROCHLORIDE 1 MG/ML
INJECTION INTRAMUSCULAR; INTRAVENOUS
Status: COMPLETED
Start: 2017-06-02 | End: 2017-06-02

## 2017-06-02 RX ORDER — FUROSEMIDE 10 MG/ML
60 INJECTION INTRAMUSCULAR; INTRAVENOUS ONCE
Status: COMPLETED | OUTPATIENT
Start: 2017-06-02 | End: 2017-06-02

## 2017-06-02 RX ORDER — SODIUM CHLORIDE 0.9 % (FLUSH) 0.9 %
10 SYRINGE (ML) INJECTION EVERY 12 HOURS SCHEDULED
Status: DISCONTINUED | OUTPATIENT
Start: 2017-06-02 | End: 2017-06-08 | Stop reason: HOSPADM

## 2017-06-02 RX ORDER — MAGNESIUM HYDROXIDE 1200 MG/15ML
LIQUID ORAL
Status: COMPLETED
Start: 2017-06-02 | End: 2017-06-02

## 2017-06-02 RX ORDER — PANTOPRAZOLE SODIUM 40 MG/10ML
40 INJECTION, POWDER, LYOPHILIZED, FOR SOLUTION INTRAVENOUS
Status: DISCONTINUED | OUTPATIENT
Start: 2017-06-02 | End: 2017-06-08 | Stop reason: HOSPADM

## 2017-06-02 RX ORDER — MIDAZOLAM HYDROCHLORIDE 1 MG/ML
2 INJECTION INTRAMUSCULAR; INTRAVENOUS ONCE
Status: COMPLETED | OUTPATIENT
Start: 2017-06-02 | End: 2017-06-02

## 2017-06-02 RX ORDER — NICOTINE POLACRILEX 4 MG
15 LOZENGE BUCCAL
Status: DISCONTINUED | OUTPATIENT
Start: 2017-06-02 | End: 2017-06-08 | Stop reason: HOSPADM

## 2017-06-02 RX ORDER — POLYETHYLENE GLYCOL 3350 17 G/17G
17 POWDER, FOR SOLUTION ORAL DAILY
Status: DISCONTINUED | OUTPATIENT
Start: 2017-06-02 | End: 2017-06-08 | Stop reason: HOSPADM

## 2017-06-02 RX ORDER — DEXTROSE MONOHYDRATE 25 G/50ML
25 INJECTION, SOLUTION INTRAVENOUS
Status: DISCONTINUED | OUTPATIENT
Start: 2017-06-02 | End: 2017-06-08 | Stop reason: HOSPADM

## 2017-06-02 RX ORDER — SODIUM CHLORIDE 0.9 % (FLUSH) 0.9 %
10 SYRINGE (ML) INJECTION AS NEEDED
Status: DISCONTINUED | OUTPATIENT
Start: 2017-06-02 | End: 2017-06-08 | Stop reason: HOSPADM

## 2017-06-02 RX ORDER — POTASSIUM CHLORIDE 1.5 G/1.77G
40 POWDER, FOR SOLUTION ORAL ONCE
Status: COMPLETED | OUTPATIENT
Start: 2017-06-02 | End: 2017-06-02

## 2017-06-02 RX ADMIN — Medication 10 ML: at 15:06

## 2017-06-02 RX ADMIN — SODIUM CHLORIDE 100 ML/HR: 900 INJECTION, SOLUTION INTRAVENOUS at 02:44

## 2017-06-02 RX ADMIN — CASTOR OIL AND BALSAM, PERU: 788; 87 OINTMENT TOPICAL at 20:05

## 2017-06-02 RX ADMIN — PANTOPRAZOLE SODIUM 40 MG: 40 INJECTION, POWDER, FOR SOLUTION INTRAVENOUS at 15:05

## 2017-06-02 RX ADMIN — PROPOFOL 30 MCG/KG/MIN: 10 INJECTION, EMULSION INTRAVENOUS at 05:50

## 2017-06-02 RX ADMIN — PROPOFOL 50 MCG/KG/MIN: 10 INJECTION, EMULSION INTRAVENOUS at 10:22

## 2017-06-02 RX ADMIN — PROPOFOL 40 MCG/KG/MIN: 10 INJECTION, EMULSION INTRAVENOUS at 13:00

## 2017-06-02 RX ADMIN — POLYETHYLENE GLYCOL 3350 17 G: 17 POWDER, FOR SOLUTION ORAL at 20:05

## 2017-06-02 RX ADMIN — METRONIDAZOLE 500 MG: 500 INJECTION, SOLUTION INTRAVENOUS at 23:21

## 2017-06-02 RX ADMIN — CASTOR OIL AND BALSAM, PERU: 788; 87 OINTMENT TOPICAL at 08:45

## 2017-06-02 RX ADMIN — ENOXAPARIN SODIUM 40 MG: 40 INJECTION SUBCUTANEOUS at 20:04

## 2017-06-02 RX ADMIN — PROPOFOL 50 MCG/KG/MIN: 10 INJECTION, EMULSION INTRAVENOUS at 22:04

## 2017-06-02 RX ADMIN — POTASSIUM CHLORIDE 40 MEQ: 1.5 POWDER, FOR SOLUTION ORAL at 06:45

## 2017-06-02 RX ADMIN — IPRATROPIUM BROMIDE AND ALBUTEROL SULFATE 3 ML: .5; 3 SOLUTION RESPIRATORY (INHALATION) at 07:22

## 2017-06-02 RX ADMIN — FUROSEMIDE 60 MG: 10 INJECTION, SOLUTION INTRAMUSCULAR; INTRAVENOUS at 05:39

## 2017-06-02 RX ADMIN — SODIUM CHLORIDE 1000 ML: 900 IRRIGANT IRRIGATION at 08:46

## 2017-06-02 RX ADMIN — IPRATROPIUM BROMIDE AND ALBUTEROL SULFATE 3 ML: .5; 3 SOLUTION RESPIRATORY (INHALATION) at 00:16

## 2017-06-02 RX ADMIN — PROPOFOL 40 MCG/KG/MIN: 10 INJECTION, EMULSION INTRAVENOUS at 17:46

## 2017-06-02 RX ADMIN — CEFEPIME 2 G: 2 INJECTION, POWDER, FOR SOLUTION INTRAVENOUS at 08:45

## 2017-06-02 RX ADMIN — IPRATROPIUM BROMIDE AND ALBUTEROL SULFATE 3 ML: .5; 3 SOLUTION RESPIRATORY (INHALATION) at 13:49

## 2017-06-02 RX ADMIN — Medication 10 ML: at 20:06

## 2017-06-02 RX ADMIN — MIDAZOLAM HYDROCHLORIDE 2 MG: 1 INJECTION INTRAMUSCULAR; INTRAVENOUS at 09:10

## 2017-06-02 RX ADMIN — CEFEPIME 2 G: 2 INJECTION, POWDER, FOR SOLUTION INTRAVENOUS at 15:05

## 2017-06-02 RX ADMIN — PROPOFOL 50 MCG/KG/MIN: 10 INJECTION, EMULSION INTRAVENOUS at 02:44

## 2017-06-02 RX ADMIN — ATORVASTATIN CALCIUM 20 MG: 20 TABLET, FILM COATED ORAL at 20:04

## 2017-06-02 RX ADMIN — ASPIRIN 325 MG: 325 TABLET ORAL at 08:45

## 2017-06-02 RX ADMIN — IPRATROPIUM BROMIDE AND ALBUTEROL SULFATE 3 ML: .5; 3 SOLUTION RESPIRATORY (INHALATION) at 18:59

## 2017-06-02 RX ADMIN — VANCOMYCIN HYDROCHLORIDE 1250 MG: 5 INJECTION, POWDER, LYOPHILIZED, FOR SOLUTION INTRAVENOUS at 17:07

## 2017-06-02 RX ADMIN — MIDAZOLAM HYDROCHLORIDE 2 MG: 1 INJECTION, SOLUTION INTRAMUSCULAR; INTRAVENOUS at 09:10

## 2017-06-02 RX ADMIN — DEXTROSE MONOHYDRATE 25 G: 25 INJECTION, SOLUTION INTRAVENOUS at 20:05

## 2017-06-02 RX ADMIN — METRONIDAZOLE 500 MG: 500 INJECTION, SOLUTION INTRAVENOUS at 15:05

## 2017-06-02 RX ADMIN — Medication 10 ML: at 20:05

## 2017-06-02 RX ADMIN — VANCOMYCIN HYDROCHLORIDE 1250 MG: 5 INJECTION, POWDER, LYOPHILIZED, FOR SOLUTION INTRAVENOUS at 04:17

## 2017-06-02 RX ADMIN — CEFEPIME 2 G: 2 INJECTION, POWDER, FOR SOLUTION INTRAVENOUS at 23:20

## 2017-06-02 RX ADMIN — METRONIDAZOLE 500 MG: 500 INJECTION, SOLUTION INTRAVENOUS at 08:45

## 2017-06-02 NOTE — PLAN OF CARE
Problem: Mechanical Ventilation, Invasive (Adult)  Goal: Signs and Symptoms of Listed Potential Problems Will be Absent or Manageable (Mechanical Ventilation, Invasive)  Outcome: Ongoing (interventions implemented as appropriate)    06/02/17 0311 06/02/17 1129   Mechanical Ventilation, Invasive   Problems Assessed (Mechanical Ventilation, Invasive) all --    Problems Present (Mechanical Ventilation, Invasive) --  inability to wean;situational response         Problem: Sepsis (Adult)  Goal: Signs and Symptoms of Listed Potential Problems Will be Absent or Manageable (Sepsis)  Outcome: Ongoing (interventions implemented as appropriate)    06/01/17 1320   Sepsis   Problems Assessed (Sepsis) all   Problems Present (Sepsis) situational response         Problem: Skin Integrity Impairment, Risk/Actual (Adult)  Goal: Identify Related Risk Factors and Signs and Symptoms  Outcome: Ongoing (interventions implemented as appropriate)    06/01/17 1320   Skin Integrity Impairment, Risk/Actual   Skin Integrity Impairment, Risk/Actual: Related Risk Factors age extremes;cognitive impairment;sensory impairment;immobility;mechanical factors;infection/disease process   Signs and Symptoms (Skin Integrity Impairment) erythema nonblanchable  (coccyx)       Goal: Skin Integrity/Wound Healing  Outcome: Ongoing (interventions implemented as appropriate)    06/01/17 1320   Skin Integrity Impairment, Risk/Actual (Adult)   Skin Integrity/Wound Healing making progress toward outcome         Problem: Pneumonia (Adult)  Goal: Signs and Symptoms of Listed Potential Problems Will be Absent or Manageable (Pneumonia)  Outcome: Ongoing (interventions implemented as appropriate)    06/01/17 1320   Pneumonia   Problems Assessed (Pneumonia) all   Problems Present (Pneumonia) respiratory compromise         Problem: Patient Care Overview (Adult)  Goal: Plan of Care Review  Outcome: Ongoing (interventions implemented as appropriate)    06/01/17 1319 06/02/17  0800   Coping/Psychosocial Response Interventions   Plan Of Care Reviewed With --  family   Patient Care Overview   Progress progress toward functional goals as expected --        Goal: Adult Individualization and Mutuality  Outcome: Ongoing (interventions implemented as appropriate)  Goal: Discharge Needs Assessment  Outcome: Ongoing (interventions implemented as appropriate)    05/31/17 1403   Discharge Needs Assessment   Concerns To Be Addressed no discharge needs identified   Readmission Within The Last 30 Days no previous admission in last 30 days   Equipment Needed After Discharge none   Discharge Disposition still a patient   Current Health   Outpatient/Agency/Support Group Needs (Pt does not utilize home health services at this time. )   Anticipated Changes Related to Illness none   Living Environment   Transportation Available car;family or friend will provide   Self-Care   Equipment Currently Used at Home oxygen  ([employer])         Problem: Fall Risk (Adult)  Goal: Identify Related Risk Factors and Signs and Symptoms  Outcome: Ongoing (interventions implemented as appropriate)    06/01/17 1318   Fall Risk   Fall Risk: Related Risk Factors confusion/agitation;fatigue/slow reaction   Fall Risk: Signs and Symptoms presence of risk factors       Goal: Absence of Falls  Outcome: Ongoing (interventions implemented as appropriate)    06/01/17 1318   Fall Risk (Adult)   Absence of Falls making progress toward outcome         Problem: Pressure Ulcer (Adult)  Goal: Signs and Symptoms of Listed Potential Problems Will be Absent or Manageable (Pressure Ulcer)  Outcome: Ongoing (interventions implemented as appropriate)    06/01/17 1317   Pressure Ulcer   Problems Assessed (Pressure Ulcer) all   Problems Present (Pressure Ulcer) other (see comments)  (stage II present admission)

## 2017-06-02 NOTE — PROGRESS NOTES
LOS: 3 days   Patient Care Team:  ERASTO Lee as PCP - General (Family Medicine)    Chief Complaint:  Pulmonology is following for pneumonia and respiratory failure    Subjective     Interval History: patient is intubated and sedated.  No acute events reported overnight.      History taken from: chart RN Patient unable to give history due to patient intubated.    Review of Systems:   Review of Systems - History obtained from chart review and unobtainable from patient due to mental status and Intubated      Objective     Vital Signs  Temp:  [97.8 °F (36.6 °C)-99.1 °F (37.3 °C)] 98.5 °F (36.9 °C)  Heart Rate:  [] 69  Resp:  [22] 22  BP: ()/(38-84) 98/45  FiO2 (%):  [40 %] 40 %  Body mass index is 35.88 kg/(m^2).    Intake/Output Summary (Last 24 hours) at 06/02/17 0935  Last data filed at 06/02/17 0845   Gross per 24 hour   Intake          3733.04 ml   Output             5310 ml   Net         -1576.96 ml     I/O this shift:  In: -   Out: 1700 [Urine:1700]    Physical Exam:  GENERAL APPEARANCE: Intubated and sedated.  Appears to be resting comfortably in bed.     HEAD: normocephalic.    EYES: PERRLA.    THROAT: ET tube in place. Oral cavity and pharynx normal. No inflammation, swelling, exudate, or lesions.     NECK: Neck supple.     CARDIAC: Normal S1 and S2. No S3, S4 or murmurs. Rhythm is regular. There is no peripheral edema, cyanosis or pallor. Extremities are warm and well perfused. Capillary refill is less than 2 seconds. No carotid bruits.    LUNGS: Clear to auscultation and percussion without rales, rhonchi, wheezing or diminished breath sounds.    ABDOMEN: Positive bowel sounds. Soft, nondistended, nontender.     EXTREMITIES: No significant deformity or joint abnormality. No edema. Peripheral pulses intact.    NEUROLOGICAL: Unable to assess due to sedation status.     PSYCHIATRIC: Unable to assess due to sedation status.     Results Review:     I reviewed the patient's new clinical  results.  I reviewed the patient's new imaging results and agree with the interpretation.    Results from last 7 days  Lab Units 06/02/17 0315 06/01/17 0527 05/31/17  0115   WBC 10*3/mm3 11.48 12.76* 13.99*   HEMOGLOBIN g/dL 10.7* 11.4* 12.5   PLATELETS 10*3/mm3 194 203 204       Results from last 7 days  Lab Units 06/02/17 0315 06/01/17 0527 05/31/17  0446 05/31/17  0115   SODIUM mmol/L 143 142  --  141   POTASSIUM mmol/L 4.0 3.9  --  3.6   CHLORIDE mmol/L 118* 115*  --  108   TOTAL CO2 mmol/L 23.6* 25.5  --  24.7   BUN mg/dL 16 19  --  20   CREATININE mg/dL 0.62 0.68  --  1.17   CALCIUM mg/dL 8.6 8.6  --  8.9   GLUCOSE mg/dL 111* 205*  --  201*   MAGNESIUM mg/dL 2.4 2.6 1.6*  --      Lab Results   Component Value Date    INR 1.02 06/01/2017    INR 1.03 05/31/2017    INR 0.98 05/31/2017    PROTIME 11.3 06/01/2017    PROTIME 11.5 05/31/2017    PROTIME 10.9 05/31/2017       Results from last 7 days  Lab Units 06/02/17 0315 06/01/17 0527 05/31/17  0115   ALK PHOS U/L 60 67 84   BILIRUBIN mg/dL 0.2 0.2 0.4   ALT (SGPT) U/L 14 21 33   AST (SGOT) U/L 14 17 44*       Results from last 7 days  Lab Units 06/02/17  0501   PH, ARTERIAL pH units 7.360   PO2 ART mm Hg 82.8   PCO2, ARTERIAL mm Hg 40.0   HCO3 ART mmol/L 22.1     Imaging Results (last 24 hours)     ** No results found for the last 24 hours. **             Medication Review:   Scheduled Medications:    aspirin 325 mg Oral Daily   atorvastatin 20 mg Oral Nightly   castor oil-balsam peru  Topical Q12H   cefepime 2 g Intravenous Q8H   enoxaparin 40 mg Subcutaneous Nightly   ipratropium-albuterol 3 mL Nebulization 4x Daily - RT   metroNIDAZOLE 500 mg Intravenous Q8H   pantoprazole 40 mg Intravenous Q AM   Pharmacy Meds to Bed Consult  Does not apply Daily   vancomycin 1,250 mg Intravenous Q12H     Continuous infusions:    DOPamine 5 mcg/kg/min Last Rate: Stopped (06/01/17 1840)   propofol 5-50 mcg/kg/min Last Rate: 30 mcg/kg/min (06/02/17 0580)        Assessment/Plan     Neuro: Continue propofol and fentanyl IV per protocol for sedation.       Respiratory: Respiratory failure likely due to pneumonia and history of smoking.     Vent settings:  RR 22 FiO2 40% PEEP 5     ABG remains stable this morning.    Will do bronchoscopy on patient due to increased infiltrates.    We'll send for specific cultures.    Continue IV antibiotics, IV steroids, scheduled inhalants and as needed neb treatments.  Ventilator settings and graphics reviewed.     ID:  Continue IV antibiotics.    Microbiology Results (last 10 days)     Procedure Component Value - Date/Time    Blood Culture [[id number]]  (Normal) Collected:  05/31/17 1550    Lab Status:  Preliminary result Specimen:  Blood from Arm, Left Updated:  06/01/17 1701     Blood Culture No growth at 24 hours    Blood Culture [[id number]]  (Normal) Collected:  05/31/17 1431    Lab Status:  Preliminary result Specimen:  Blood from Hand, Right Updated:  06/01/17 1601     Blood Culture No growth at 24 hours    Blood Culture [[id number]]  (Normal) Collected:  05/30/17 1727    Lab Status:  Preliminary result Specimen:  Blood from Arm, Left Updated:  06/01/17 1801     Blood Culture No growth at 2 days    Blood Culture [[id number]]  (Abnormal) Collected:  05/30/17 1649    Lab Status:  Preliminary result Specimen:  Blood from Arm, Left Updated:  06/01/17 0732     Blood Culture Gram positive cocci, consistent with Staph spp (A)     Isolated from --     Gram Stain Result Gram positive cocci in clusters           Cardiac:     Hemodynamically Stable.  Continuous ECG and pulse ox monitoring.        Renal: BUN/Creatinine WNL.  Strict I&Os.   Ordered Lasix 60 mg IV and potassium 40 mEq by mouth.  Discontinued normal saline.      Endocrine:  monitor glucose targeting 140-180.     Electrolytes:    Monitor levels, manage and replete per protocols.    GI: Tube feedings are on hold at this time for bronchoscopy.      Hematology:   Monitor blood  counts, transfuse at or below 7 g/dL.  Unless patient is actively bleeding then began transfusing at 8 g/dL.         Patient Active Problem List   Diagnosis Code   • Pneumonia J18.9   • Sepsis due to pneumonia J18.9, A41.9         Bedside rounds were completed with RN and RRT, discussed case and plan in great detail.    Spoke with family member this morning.  Discussed the case in detail- explained all the labs results and images. Discussed plan for the upcoming days and answered questions to their satisfaction.    ERASTO Mckeon  06/02/17  9:35 AM        Attestation: Scribed for Dr. Peter, ERASTO Bowden      I, Abdulkadir Peter M.D. attest that the above note accurately reflects the work and decisions made  by me.  Patient was seen and evaluated by Dr. Peter, including history of present illness, physical exam, assessment, and treatment plan.  The above note was reviewed and edited by Dr. Peter. The patient was  discussed with Miss Sim  and I agree with her history physical assessment and plan.  Critical Care time spent in direct patient care: 32 minutes (excluding procedure time, if applicable) including high complexity decision making to assess, manipulate, and support vital organ system failure in this individual who has impairment of one or more vital organ systems such that there is a high probability of imminent or life threatening deterioration in the patient’s condition.

## 2017-06-02 NOTE — PROGRESS NOTES
Bronchoscopy Procedure Note    Date of Operation: 6/2/2017    Pre-op Diagnosis: Increasing pulmonary infiltrates, respiratory failure, ventilated    Post-op Diagnosis: Mucopurulent secretions in the right lower lobe and left lower lobe.  Respiratory failure on a ventilator    Surgeon: Abdulkadir Peter MD    Assistants: None    Anesthesia: Please see anesthesia report for details    Operation: Flexible fiberoptic bronchoscopy, diagnostic     Findings: Mucopurulent secretions in the lower lobes    Specimen: Bronchial washings    Bronchial lavage of right middle lobe    Bronchiolar lavage of right lower lobe    Bronchoalveolar lavage left lower lobe    Estimated Blood Loss: None    Complications: None    Indications and History:  The patient is a 71 y.o. female with respiratory failure on a ventilator.  The risks, benefits, complications, treatment options and expected outcomes were discussed with the patient.  The possibilities of reaction to medication, pulmonary aspiration, perforation of a viscus, bleeding, failure to diagnose a condition and creating a complication requiring transfusion or operation were discussed with the patient who freely signed the consent.      Description of Procedure:  The patient was seen in the ICU and the site of surgery properly noted/marked.  identified as Mayra Hays and the procedure verified as Flexible Fiberoptic Bronchoscopy.  A Time Out was held and the above information confirmed.     Procedure was done in the ICU at bedside.  Patient is already intubated and is on propofol and fentanyl drip.  Bronchoscope was passed to the ET tube  2 ml 1 % lidocaine was used topically on the nadine.  Careful inspection of the tracheal lumen was accomplished. The scope was sequentially passed into the left main and then left upper and lower bronchi and segmental bronchi.       The scope was then withdrawn and advanced into the right main bronchus and then into the RUL, RML, and RLL bronchi and  segmental bronchi.     No endobronchial disease or lesions were seen except Picus plugging of right lower lobe and left lower lobe subsegments were seen  Samples-  Bronchial washings    Then the bronchoscope was wedged into the right lower lobe for a bronchoalveolar lavage and 60 cc of saline was given once and close to 22 cc was aspirated back  Then the bronchoscope was wedged into the right middle lobe for a bronchiolar lavage and close to 60 cc of saline was given once and close to 18 cc of mucopurulent secretions were collected back.    Then the bronchoscope was wedged into the left lower lobe for bronchoalveolar lavage and close to 60 cc of saline was given once close to 16 cc of mucopurulent bronchoalveolar lavage was collected back with mucousy secretions            Samples were sent for  microbiology.  Please follow up the results  Patient was placed on 100% oxygen on  the ventilator while the procedure was done.      Abdulkadir Peter MD

## 2017-06-02 NOTE — PROGRESS NOTES
"  I have personally seen and examined the patient today and discussed overnight interval progress and pertinent issues with nursing staff.    Review of Systems-unable to obtain as the patient is sedated and intubated on the ventilator    History taken from: chart RN      Vital Signs    BP 98/45 (BP Location: Right arm, Patient Position: Lying)  Pulse 69  Temp 98.5 °F (36.9 °C) (Oral)   Resp 22  Ht 68\" (172.7 cm)  Wt 236 lb (107 kg)  SpO2 98%  BMI 35.88 kg/m2    Temp:  [97.8 °F (36.6 °C)-99.1 °F (37.3 °C)] 98.5 °F (36.9 °C)      Intake/Output Summary (Last 24 hours) at 06/02/17 0903  Last data filed at 06/02/17 0845   Gross per 24 hour   Intake          3733.04 ml   Output             5310 ml   Net         -1576.96 ml     Intake & Output (last 3 days)       05/30 0701 - 05/31 0700 05/31 0701 - 06/01 0700 06/01 0701 - 06/02 0700 06/02 0701 - 06/03 0700    I.V. (mL/kg) 1072 (10) 3213.4 (30.7) 3048 (28.5)     Other 110 200 100     NG/GT  335      IV Piggyback 900 1100 1100     Total Intake(mL/kg) 2082 (19.4) 4848.4 (46.3) 4248 (39.7)     Urine (mL/kg/hr) 670 2705 (1.1) 3235 (1.3) 1700 (7.7)    Other   500 (0.2)     Total Output 670 2705 3735 1700    Net +1412 +2143.4 +513 -1700                   Physical Exam:      General Appearance:  Intubated, sedated, face is red    Head:  Normocephalic, without obvious abnormality, atraumatic   Eyes:      Lids and lashes normal, conjunctivae and sclerae normal, no icterus, no pallor, corneas clear, PERRLA   Ears:  Ears appear intact with no abnormalities noted   Throat: No oral lesions, no thrush, oral mucosa moist   Neck: No adenopathy, supple, trachea midline, no thyromegaly, no carotid bruit, no JVD   Back:  No tenderness to percussion or palpation, range of motion normal   Lungs:  Wheezing bilaterally on inspiration    Heart:  Regular rhythm and normal rate, normal S1 and S2, no murmur, no gallop, no rub, no click   Chest Wall:  No abnormalities observed   Abdomen:  " Distended, Normal bowel sounds, no masses, no organomegaly, non-tender, no guarding, no rebound tenderness   Rectal:  Deferred   Extremities: Moves all extremities well, no edema, no cyanosis, no redness   Pulses: Pulses palpable and equal bilaterally   Skin: No bleeding, bruising or rash   Lymph nodes: No palpable adenopathy   Neurologic: Sedated            Results:      Results from last 7 days  Lab Units 06/02/17 0315 06/01/17  0527 05/31/17  0115 05/30/17  1649   WBC 10*3/mm3 11.48 12.76* 13.99* 18.80*     Lab Results   Component Value Date    NEUTROABS 8.55 (H) 06/02/2017         Results from last 7 days  Lab Units 06/02/17 0315   CREATININE mg/dL 0.62         Results from last 7 days  Lab Units 06/02/17 0315 06/01/17 0527 05/31/17  0115   CRP mg/dL 9.33* 10.96* 5.64*       Results Review:    I have personally reviewed laboratory data, culture results, radiology studies and antimicrobial therapy.    Hospital Medications (active)       Dose Frequency Start End    aspirin tablet 325 mg 325 mg Daily 5/31/2017     Sig - Route: Take 1 tablet by mouth Daily. - Oral    atorvastatin (LIPITOR) tablet 20 mg 20 mg Nightly 5/31/2017     Sig - Route: Take 1 tablet by mouth Every Night. - Oral    castor oil-balsam peru (VENELEX) ointment  Every 12 Hours Scheduled 5/31/2017     Sig - Route: Apply  topically Every 12 (Twelve) Hours. - Topical    cefepime (MAXIPIME) 2 g/100 mL 0.9% NS (mbp) 2 g Every 8 Hours Scheduled 5/31/2017     Sig - Route: Infuse 100 mL into a venous catheter Every 8 (Eight) Hours. - Intravenous    DOPamine 400 mg/250 mL (1.6 mg/mL) infusion 5 mcg/kg/min × 107 kg Titrated 5/31/2017     Sig - Route: Infuse 535 mcg/min into a venous catheter Dose Adjusted By Provider As Needed. - Intravenous    enoxaparin (LOVENOX) syringe 40 mg 40 mg Nightly 6/1/2017     Sig - Route: Inject 0.4 mL under the skin Every Night. - Subcutaneous    furosemide (LASIX) injection 60 mg 60 mg Once 6/2/2017 6/2/2017    Sig -  "Route: Infuse 6 mL into a venous catheter 1 (One) Time. - Intravenous    ipratropium-albuterol (DUO-NEB) nebulizer solution 3 mL 3 mL 4 Times Daily - RT 5/31/2017     Sig - Route: Take 3 mL by nebulization 4 (Four) Times a Day. - Nebulization    Magnesium Sulfate 2 gram Bolus, followed by 8 gram infusion (total Mg dose 10 grams)- Mg less than or equal to 1mg/dL 2 g As Needed 5/31/2017     Sig - Route: Infuse 50 mL into a venous catheter As Needed (Mg less than or equal to 1mg/dL). - Intravenous    Linked Group 1:  \"Or\" Linked Group Details        magnesium sulfate 4 gram infusion- Mg 1.6-1.9 mg/dL 4 g As Needed 5/31/2017     Sig - Route: Infuse 100 mL into a venous catheter As Needed (Mg 1.6-1.9 mg/dL). - Intravenous    Linked Group 1:  \"Or\" Linked Group Details        Magnesium Sulfate 6 gram Infusion (2 gm x 3) -Mg 1.1 -1.5 mg/dL 2 g As Needed 5/31/2017     Sig - Route: Infuse 50 mL into a venous catheter As Needed (Mg 1.1 -1.5 mg/dL). - Intravenous    Linked Group 1:  \"Or\" Linked Group Details        metroNIDAZOLE (FLAGYL) IVPB 500 mg 500 mg Every 8 Hours Scheduled 5/31/2017     Sig - Route: Infuse 100 mL into a venous catheter Every 8 (Eight) Hours. - Intravenous    Pharmacy Meds to Bed Consult  Daily 5/31/2017     Sig - Route: Daily. - Does not apply    potassium chloride (KLOR-CON) packet 40 mEq 40 mEq As Needed 5/31/2017     Sig - Route: Take 40 mEq by mouth As Needed (potassium replacement, see admin instructions). - Oral    Linked Group 2:  \"Or\" Linked Group Details        potassium chloride (KLOR-CON) packet 40 mEq 40 mEq Once 6/2/2017 6/2/2017    Sig - Route: Take 40 mEq by mouth 1 (One) Time. - Oral    potassium chloride (MICRO-K) CR capsule 40 mEq 40 mEq As Needed 5/31/2017     Sig - Route: Take 4 capsules by mouth As Needed (potassium replacement.  see admin instructions). - Oral    Linked Group 2:  \"Or\" Linked Group Details        potassium chloride 10 mEq in 100 mL IVPB 10 mEq Every 1 Hour PRN " "5/31/2017     Sig - Route: Infuse 100 mL into a venous catheter Every 1 (One) Hour As Needed (potassium protocol PERIPHERAL - see admin instructions). - Intravenous    Linked Group 2:  \"Or\" Linked Group Details        propofol (DIPRIVAN) infusion 10 mg/mL 100 mL 5-50 mcg/kg/min × 107 kg Titrated 5/31/2017     Sig - Route: Infuse 535-5,350 mcg/min into a venous catheter Dose Adjusted By Provider As Needed. - Intravenous    Notes to Pharmacy: Order received from Dr Peter to start diprivan per protocol if patient remains bradycardic on precedex IV    sodium chloride (NS) 0.9 % irrigation solution  - ADS Override Pull   6/2/2017 6/2/2017    Notes to Pharmacy: Created by cabinet override    sodium chloride 0.9 % flush 1-10 mL 1-10 mL As Needed 5/30/2017     Sig - Route: Infuse 1-10 mL into a venous catheter As Needed for Line Care. - Intravenous    vancomycin (VANCOCIN) 1,250 mg in sodium chloride 0.9 % 250 mL IVPB 1,250 mg Every 18 Hours 6/1/2017     Sig - Route: Infuse 1,250 mg into a venous catheter Every 18 (Eighteen) Hours. - Intravenous    dexmedetomidine HCl (PRECEDEX) 400 mcg in sodium chloride 0.9 % 100 mL (4 mcg/mL) infusion (Discontinued) 0.2-1.5 mcg/kg/hr × 107 kg Titrated 5/31/2017 6/2/2017    Sig - Route: Infuse 21.4-160.5 mcg/hr into a venous catheter Dose Adjusted By Provider As Needed. - Intravenous    doxycycline (VIBRAMYCIN) 100 mg/100 mL 0.9% NS MBP (Discontinued) 100 mg Every 12 Hours 5/31/2017 6/1/2017    Sig - Route: Infuse 100 mL into a venous catheter Every 12 (Twelve) Hours. - Intravenous    heparin (porcine) 5000 UNIT/ML injection 2,500 Units (Discontinued) 23.4 Units/kg × 107 kg As Needed 5/31/2017 6/1/2017    Sig - Route: Infuse 0.5 mL into a venous catheter As Needed (Per Heparin Nomogram For PTT 45-55). - Intravenous    Reason for Discontinue: Drug interaction    heparin (porcine) 5000 UNIT/ML injection 5,000 Units (Discontinued) 46.7 Units/kg × 107 kg As Needed 5/31/2017 6/1/2017    Sig - " Route: Infuse 1 mL into a venous catheter As Needed (Per Heparin Nomogram For PTT Less Than 45). - Intravenous    Reason for Discontinue: Drug interaction    heparin 31989 units/250 mL (100 units/mL) in 0.45 % NaCl infusion (Discontinued) 9.35 Units/kg/hr × 81.1 kg (Adjusted) Titrated 5/31/2017 6/1/2017    Sig - Route: Infuse 758.285 Units/hr into a venous catheter Dose Adjusted By Provider As Needed. - Intravenous    sodium chloride 0.9 % infusion (Discontinued) 100 mL/hr Continuous 5/30/2017 6/2/2017    Sig - Route: Infuse 100 mL/hr into a venous catheter Continuous. - Intravenous            Cultures:    Blood Culture   Date Value Ref Range Status   05/31/2017 No growth at 24 hours  Preliminary   05/31/2017 No growth at 24 hours  Preliminary   05/30/2017 No growth at 2 days  Preliminary   05/30/2017 Gram positive cocci, consistent with Staph spp (A)  Preliminary       PROBLEM LIST:    Patient Active Problem List   Diagnosis   • Pneumonia   • Sepsis due to pneumonia       Assessment/Plan     ASSESSMENT:    1. Sepsis  2. Staph bacteremia  3. Pneumonia     PLAN:    Stable.  Blood culture from 5/30/2017 with staph not yet further identified.    At this time would recommend to continue broad-spectrum coverage with vancomycin, cefepime, and Flagyl until culture results are finalized. Doxycycline was discontinued. The positive blood culture one set out of 2 could be a contaminant and we will de-escalate when cultures have finalized.      Patients findings and recommendations were discussed with nursing staff    Code Status: Full Code    Rosalia Pinto PA-C  06/02/17  9:03 AM    Physician Attestation:    I have personally seen and examined the patient. I reviewed the patient's data including history of present illness, review of systems, physical examination, assessment and treatment plan and agree with findings above. The assessment and plan are my own.  I have reviewed and edited the note above after discussing the  findings with Rosalia Pinto PA-C.    James Mercer MD  Infectious Diseases  06/02/17  11:54 AM

## 2017-06-02 NOTE — PROGRESS NOTES
Kinetics :  Vancomycin / cefepime / flagyl  Day 3    The pre-dose vancomycin level was subtherapeutic.  Will adjust the dose to vancomycin 1250mg q 12 hrs and continue to follow with you.

## 2017-06-02 NOTE — PROGRESS NOTES
Subjective     History:   Mayra Hays is a 71 y.o. female admitted on 5/30/2017 secondary to Sepsis due to pneumonia     Procedures:   Intubation in the ED    Patient seen and examined with JAMES Ma. Sedated on vent support. BP remains borderline low and thought to be 2/2 sedation. Not currently requiring vasopressor support. Pt was given a dose of Lasix this AM with good UOP. Plan for bronchoscopy this AM as CXR yesterday appeared worse. RN also reports TF's are held 2/2 episode of vomiting overnight.     History taken from: chart, and RN.      Objective     Vital Signs  Temp:  [97.8 °F (36.6 °C)-99.1 °F (37.3 °C)] 98.5 °F (36.9 °C)  Heart Rate:  [] 69  Resp:  [22] 22  BP: ()/(38-84) 98/45  FiO2 (%):  [40 %] 40 %    Intake/Output Summary (Last 24 hours) at 06/02/17 0915  Last data filed at 06/02/17 0845   Gross per 24 hour   Intake          3733.04 ml   Output             5310 ml   Net         -1576.96 ml         Physical Exam:  General:    Sedated on vent support    Heart:      Normal S1 and S2. Regular rate and rhythm. No significant murmur, rubs or gallops appreciated.   Lungs:     Respirations regular, even and unlabored. Coarse breath sounds throughout. No wheezes.    Abdomen:   Soft and nontender. No guarding, rebound tenderness or  organomegaly noted. Bowel sounds present x 4.   Extremities:  Tr LE edema B/L. Moves UE and LE equally B/L.     Results Review:      Results from last 7 days  Lab Units 06/02/17 0315 06/01/17 0527 05/31/17 0115 05/30/17  1649   WBC 10*3/mm3 11.48 12.76* 13.99* 18.80*   HEMOGLOBIN g/dL 10.7* 11.4* 12.5 14.4   PLATELETS 10*3/mm3 194 203 204 298       Results from last 7 days  Lab Units 06/02/17 0315 06/01/17 0527 05/31/17  0115 05/30/17  1649   SODIUM mmol/L 143 142 141 138   POTASSIUM mmol/L 4.0 3.9 3.6 5.5*   CHLORIDE mmol/L 118* 115* 108 104   TOTAL CO2 mmol/L 23.6* 25.5 24.7 31.4   BUN mg/dL 16 19 20 13   CREATININE mg/dL 0.62 0.68 1.17 1.02   CALCIUM mg/dL  8.6 8.6 8.9 9.2   GLUCOSE mg/dL 111* 205* 201* 308*       Results from last 7 days  Lab Units 06/02/17 0315 06/01/17 0527 05/31/17  0115 05/30/17  1649   BILIRUBIN mg/dL 0.2 0.2 0.4 0.5   ALK PHOS U/L 60 67 84 106*   AST (SGOT) U/L 14 17 44* 54*   ALT (SGPT) U/L 14 21 33 34       Results from last 7 days  Lab Units 06/02/17 0315 06/01/17 0527 05/31/17  0446   MAGNESIUM mg/dL 2.4 2.6 1.6*       Results from last 7 days  Lab Units 06/01/17 0527 05/31/17  0446 05/31/17  0115 05/30/17  1649   INR  1.02 1.03 0.98 0.93       Results from last 7 days  Lab Units 06/01/17 0527 05/31/17  1101 05/31/17  0446 05/30/17  2331   CK TOTAL U/L 33  --  45 51   TROPONIN I ng/mL 0.220* 0.450* 0.743* 1.117*   CK MB INDEX % 5.9*  --   --  5.6*   MYOGLOBIN ng/mL 47.0 91.0 75.0 98.0       Imaging Results (last 24 hours)     ** No results found for the last 24 hours. **            Medications:    aspirin 325 mg Oral Daily   atorvastatin 20 mg Oral Nightly   castor oil-balsam peru  Topical Q12H   cefepime 2 g Intravenous Q8H   enoxaparin 40 mg Subcutaneous Nightly   ipratropium-albuterol 3 mL Nebulization 4x Daily - RT   metroNIDAZOLE 500 mg Intravenous Q8H   midazolam      Pharmacy Meds to Bed Consult  Does not apply Daily   vancomycin 1,250 mg Intravenous Q12H       DOPamine 5 mcg/kg/min Last Rate: Stopped (06/01/17 1840)   propofol 5-50 mcg/kg/min Last Rate: 30 mcg/kg/min (06/02/17 0550)           Assessment/Plan   Severe sepsis: Likely 2/2 right-sided pneumonia. 1/2 blood cultures revealing GPC but possibly a contaminant as other cultures remain negative. Pt has been borderline hypotensive and has required pressors but hypotension thought to be more related to sedation. WBC and CRP improving. Repeat CXR appears worse and pulm planning on bronch this AM. Cont broad spectrum IV antibiotics per ID. Cont to follow culture and repeat labs in the AM. Repeat CXR in the AM. ID and pulm input appreciated.     Acute hypoxic and hypercapnic  respiratory failure: Likely 2/2 sepsis, pneumonia and COPD exacerbation. Cont broad spectrum IV antibiotics, nebs and vent support. Repeat labs, ABG and CXR in the AM. Pulm following with input appreciated.     NSTEMI: Likely type II in setting of respiratory failure. Echo obtained revealing an EF of 55-60%, grade II diastolic dysfunction, trace-to-mild MR, mild TR and mild pulmonary HTN. Cont management per cardiology. Cont to monitor on telemetry. Cardiology input appreciated.     Mild hyperkalemia: Resolved. Repeat labs in the AM.     Sinus bradycardia: Metoprolol D/C'd. Cont to monitor on telemetry.     CKD III: Cont to monitor. Repeat labs in the AM.     PPX  GI: PPI  DVT: SQ Lovenox    Discussed with Dr. Peter.    Pt is at high risk 2/2 severe sepsis, respiratory failure, pneumonia, COPD exacerbation, NSTEMI and hx of heavy tobacco abuse.         Madhu Hoffmann,   06/02/17  9:15 AM

## 2017-06-02 NOTE — PROGRESS NOTES
"Reason for follow-up:  Acute non-ST elevation MI Type II and episodes of sinus bradycardia    Subjective:    Patient is intubated on mechanical ventilation  Patient developed an episode of bradycardia-sinus-brief.  At present her heart rate is in 60s      Medication Review:     aspirin 325 mg Oral Daily   atorvastatin 20 mg Oral Nightly   castor oil-balsam peru  Topical Q12H   cefepime 2 g Intravenous Q8H   enoxaparin 40 mg Subcutaneous Nightly   ipratropium-albuterol 3 mL Nebulization 4x Daily - RT   metroNIDAZOLE 500 mg Intravenous Q8H   pantoprazole 40 mg Intravenous Q AM   Pharmacy Meds to Bed Consult  Does not apply Daily   sodium chloride 10 mL Intracatheter Q12H   sodium chloride 10 mL Intracatheter Q12H   vancomycin 1,250 mg Intravenous Q12H         Vital Sign Min/Max for last 24 hours  Temp  Min: 98.4 °F (36.9 °C)  Max: 99.1 °F (37.3 °C)   BP  Min: 72/38  Max: 124/59   Pulse  Min: 54  Max: 87   Resp  Min: 22  Max: 22   SpO2  Min: 95 %  Max: 100 %   No Data Recorded   Weight  Min: 236 lb (107 kg)  Max: 236 lb (107 kg)     Flowsheet Rows         First Filed Value    Admission Height  68\" (172.7 cm) Documented at 05/30/2017 1611    Admission Weight  230 lb (104 kg) Documented at 05/30/2017 1611          Physical Exam:     General Appearance:  Intubated on mechanical ventilator    Head:  Normocephalic, without obvious abnormality, atraumatic   Eyes:      Lids and lashes normal, conjunctivae and sclerae normal, no icterus, no pallor, corneas clear, PERRLA   Ears:  Ears appear intact with no abnormalities noted   Throat: No oral lesions, no thrush, oral mucosa moist   Neck: No adenopathy, supple, trachea midline, no thyromegaly, no carotid bruit, no JVD   Back:  No kyphosis present, no scoliosis present, no skin lesions, erythema or scars, no tenderness to percussion or palpation, range of motion normal   Lungs:  Bilateral equal air entry. Bilateral wheezes were heard    Heart:  Regular rhythm and normal rate, " normal S1 and S2, no murmur, no gallop, no rub, no click   Chest Wall:  No abnormalities observed   Abdomen:  Normal bowel sounds, no masses, no organomegaly, soft non-tender, non-distended, no guarding, no rebound tenderness   Rectal:  Deferred   Extremities: Moves all extremities well, no edema, no cyanosis, no redness            Telemetry  Normal sinus rhythm.  Heart rate 55-60 bpm    ECG-sinus bradycardia and T-wave inversion in anterolateral leads.  No significant change compared to previous EKG      Labs    Results from last 7 days  Lab Units 06/02/17 0315 06/01/17 0527 05/31/17 0115 05/30/17  1649   WBC 10*3/mm3 11.48 12.76* 13.99* 18.80*   HEMOGLOBIN g/dL 10.7* 11.4* 12.5 14.4   HEMATOCRIT % 35.1* 36.9* 39.7 47.0   PLATELETS 10*3/mm3 194 203 204 298       Results from last 7 days  Lab Units 06/02/17 0315 06/01/17 0527 05/31/17 0115 05/30/17  1649   SODIUM mmol/L 143 142 141 138   POTASSIUM mmol/L 4.0 3.9 3.6 5.5*   CHLORIDE mmol/L 118* 115* 108 104   TOTAL CO2 mmol/L 23.6* 25.5 24.7 31.4   BUN mg/dL 16 19 20 13   CREATININE mg/dL 0.62 0.68 1.17 1.02   CALCIUM mg/dL 8.6 8.6 8.9 9.2   GLUCOSE mg/dL 111* 205* 201* 308*       Results from last 7 days  Lab Units 06/02/17 0315 06/01/17 0527 05/31/17 0115 05/30/17  1649   BILIRUBIN mg/dL 0.2 0.2 0.4 0.5   ALK PHOS U/L 60 67 84 106*   AST (SGOT) U/L 14 17 44* 54*   ALT (SGPT) U/L 14 21 33 34       Results from last 7 days  Lab Units 06/02/17 0315 06/01/17 0527 05/31/17  0446   MAGNESIUM mg/dL 2.4 2.6 1.6*       Results from last 7 days  Lab Units 06/01/17 0527 05/31/17 0446 05/31/17 0115 05/30/17  1649   INR  1.02 1.03 0.98 0.93       Results from last 7 days  Lab Units 06/02/17  0315 06/01/17  0527 05/31/17  0115   CRP mg/dL 9.33* 10.96* 5.64*       Results from last 7 days  Lab Units 06/02/17  1448 06/01/17  0527 05/31/17  1101 05/31/17  0446 05/30/17  2331   CK TOTAL U/L 22* 33  --  45 51   TROPONIN I ng/mL 0.145* 0.220* 0.450* 0.743* 1.117*   CK MB  INDEX % 1.4 5.9*  --   --  5.6*   MYOGLOBIN ng/mL  --  47.0 91.0 75.0 98.0       Results from last 7 days  Lab Units 06/02/17  0501   PH, ARTERIAL pH units 7.360   PO2 ART mm Hg 82.8   PCO2, ARTERIAL mm Hg 40.0   HCO3 ART mmol/L 22.1         Assessment    1. Acute non-ST elevation MI - Type 2 . Precipitated by respiratory failure. Peak troponin is 0.7. ECG showed diffuse T wave abnormalities suggestive of ischemia. Unknown whether patient had chest pain as she is intubated to elicit any history.      2.  Episodes of sinus bradycardia-possibly vagal response.  Patient was on metoprolol therapy prior to admission which is on hold now     3. Hypotension -resolved.  Off dopamine     4. Acute hypoxic/hypercapnic respiratory failure due to exacerbation of COPD and pneumonia    Plan    1.  Continue aspirin.  Stop IV heparin infusion.  Continue atorvastatin  2.  Beta blocker is not considered at this time due to episodes of bradycardia  3.  Start DVT prophylaxis-Lovenox 40 mg subcutaneous daily  4.Will consider CAD workup once extubated and respiratory status improves    .    Yin Mae MD  06/02/17  4:06 PM

## 2017-06-02 NOTE — PLAN OF CARE
Problem: Mechanical Ventilation, Invasive (Adult)  Goal: Signs and Symptoms of Listed Potential Problems Will be Absent or Manageable (Mechanical Ventilation, Invasive)  Outcome: Ongoing (interventions implemented as appropriate)    06/02/17 0311   Mechanical Ventilation, Invasive   Problems Assessed (Mechanical Ventilation, Invasive) all   Problems Present (Mechanical Ventilation, Invasive) none

## 2017-06-03 ENCOUNTER — APPOINTMENT (OUTPATIENT)
Dept: GENERAL RADIOLOGY | Facility: HOSPITAL | Age: 72
End: 2017-06-03

## 2017-06-03 LAB
A-A DO2: 130.4 MMHG (ref 0–300)
A-A DO2: 130.9 MMHG (ref 0–300)
A-A DO2: 67.2 MMHG (ref 0–300)
ALBUMIN SERPL-MCNC: 3 G/DL (ref 3.4–4.8)
ALBUMIN/GLOB SERPL: 1 G/DL (ref 1.5–2.5)
ALP SERPL-CCNC: 56 U/L (ref 35–104)
ALT SERPL W P-5'-P-CCNC: 12 U/L (ref 10–36)
ANION GAP SERPL CALCULATED.3IONS-SCNC: 3.1 MMOL/L (ref 3.6–11.2)
ARTERIAL PATENCY WRIST A: ABNORMAL
ARTERIAL PATENCY WRIST A: POSITIVE
ARTERIAL PATENCY WRIST A: POSITIVE
AST SERPL-CCNC: 13 U/L (ref 10–30)
ATMOSPHERIC PRESS: 730 MMHG
ATMOSPHERIC PRESS: 734 MMHG
ATMOSPHERIC PRESS: 734 MMHG
BASE EXCESS BLDA CALC-SCNC: -2.8 MMOL/L
BASE EXCESS BLDA CALC-SCNC: -3.7 MMOL/L
BASE EXCESS BLDA CALC-SCNC: -5.8 MMOL/L
BASOPHILS # BLD AUTO: 0.03 10*3/MM3 (ref 0–0.3)
BASOPHILS NFR BLD AUTO: 0.4 % (ref 0–2)
BDY SITE: ABNORMAL
BILIRUB SERPL-MCNC: 0.2 MG/DL (ref 0.2–1.8)
BODY TEMPERATURE: 98.6 C
BUN BLD-MCNC: 23 MG/DL (ref 7–21)
BUN/CREAT SERPL: 35.9 (ref 7–25)
CALCIUM SPEC-SCNC: 8.6 MG/DL (ref 7.7–10)
CHLORIDE SERPL-SCNC: 116 MMOL/L (ref 99–112)
CO2 SERPL-SCNC: 25.9 MMOL/L (ref 24.3–31.9)
COHGB MFR BLD: 0.3 % (ref 0–5)
COHGB MFR BLD: 0.4 % (ref 0–5)
COHGB MFR BLD: 1 % (ref 0–5)
CREAT BLD-MCNC: 0.64 MG/DL (ref 0.43–1.29)
CRP SERPL-MCNC: 10.69 MG/DL (ref 0–0.99)
DEPRECATED RDW RBC AUTO: 44.8 FL (ref 37–54)
EOSINOPHIL # BLD AUTO: 0.28 10*3/MM3 (ref 0–0.7)
EOSINOPHIL NFR BLD AUTO: 3.4 % (ref 0–7)
ERYTHROCYTE [DISTWIDTH] IN BLOOD BY AUTOMATED COUNT: 14.4 % (ref 11.5–14.5)
GFR SERPL CREATININE-BSD FRML MDRD: 92 ML/MIN/1.73
GLOBULIN UR ELPH-MCNC: 3 GM/DL
GLUCOSE BLD-MCNC: 100 MG/DL (ref 70–110)
GLUCOSE BLDC GLUCOMTR-MCNC: 81 MG/DL (ref 70–130)
GLUCOSE BLDC GLUCOMTR-MCNC: 89 MG/DL (ref 70–130)
HCO3 BLDA-SCNC: 18.5 MMOL/L (ref 22–26)
HCO3 BLDA-SCNC: 19.7 MMOL/L (ref 22–26)
HCO3 BLDA-SCNC: 21.7 MMOL/L (ref 22–26)
HCT VFR BLD AUTO: 32.8 % (ref 37–47)
HCT VFR BLD CALC: 31 % (ref 37–47)
HCT VFR BLD CALC: 32 % (ref 37–47)
HCT VFR BLD CALC: 34 % (ref 37–47)
HGB BLD-MCNC: 9.9 G/DL (ref 12–16)
HGB BLDA-MCNC: 10.5 G/DL (ref 12–16)
HGB BLDA-MCNC: 11 G/DL (ref 12–16)
HGB BLDA-MCNC: 11.7 G/DL (ref 12–16)
HOROWITZ INDEX BLD+IHG-RTO: 40 %
IMM GRANULOCYTES # BLD: 0.15 10*3/MM3 (ref 0–0.03)
IMM GRANULOCYTES NFR BLD: 1.8 % (ref 0–0.5)
LYMPHOCYTES # BLD AUTO: 1.26 10*3/MM3 (ref 1–3)
LYMPHOCYTES NFR BLD AUTO: 15.3 % (ref 16–46)
MAGNESIUM SERPL-MCNC: 2.3 MG/DL (ref 1.7–2.6)
MCH RBC QN AUTO: 26.5 PG (ref 27–33)
MCHC RBC AUTO-ENTMCNC: 30.2 G/DL (ref 33–37)
MCV RBC AUTO: 87.7 FL (ref 80–94)
METHGB BLD QL: 0.1 % (ref 0–3)
METHGB BLD QL: 0.2 % (ref 0–3)
METHGB BLD QL: 0.3 % (ref 0–3)
MODALITY: ABNORMAL
MONOCYTES # BLD AUTO: 0.77 10*3/MM3 (ref 0.1–0.9)
MONOCYTES NFR BLD AUTO: 9.4 % (ref 0–12)
NEUTROPHILS # BLD AUTO: 5.73 10*3/MM3 (ref 1.4–6.5)
NEUTROPHILS NFR BLD AUTO: 69.7 % (ref 40–75)
OSMOLALITY SERPL CALC.SUM OF ELEC: 292.5 MOSM/KG (ref 273–305)
OXYHGB MFR BLDV: 95.8 % (ref 85–100)
OXYHGB MFR BLDV: 96.2 % (ref 85–100)
OXYHGB MFR BLDV: 98.6 % (ref 85–100)
PCO2 BLDA: 22.4 MM HG (ref 35–45)
PCO2 BLDA: 38.8 MM HG (ref 35–45)
PCO2 BLDA: 40.4 MM HG (ref 35–45)
PEEP RESPIRATORY: 5 CM[H2O]
PH BLDA: 7.32 PH UNITS (ref 7.35–7.45)
PH BLDA: 7.35 PH UNITS (ref 7.35–7.45)
PH BLDA: 7.53 PH UNITS (ref 7.35–7.45)
PHOSPHATE SERPL-MCNC: 3.7 MG/DL (ref 2.7–4.5)
PLATELET # BLD AUTO: 218 10*3/MM3 (ref 130–400)
PMV BLD AUTO: 11.8 FL (ref 6–10)
PO2 BLDA: 180.2 MM HG (ref 80–100)
PO2 BLDA: 97.4 MM HG (ref 80–100)
PO2 BLDA: 99.8 MM HG (ref 80–100)
POTASSIUM BLD-SCNC: 3.6 MMOL/L (ref 3.5–5.3)
POTASSIUM BLD-SCNC: 4.4 MMOL/L (ref 3.5–5.3)
PROT SERPL-MCNC: 6 G/DL (ref 6–8)
RBC # BLD AUTO: 3.74 10*6/MM3 (ref 4.2–5.4)
SAO2 % BLDCOA: 96.8 % (ref 90–100)
SAO2 % BLDCOA: 97 % (ref 90–100)
SAO2 % BLDCOA: 99.1 % (ref 90–100)
SET MECH RESP RATE: 18
SET MECH RESP RATE: 20
SET MECH RESP RATE: 22
SODIUM BLD-SCNC: 145 MMOL/L (ref 135–153)
TROPONIN I SERPL-MCNC: 0.12 NG/ML
VENTILATOR MODE: AC
VT ON VENT VENT: 400 ML
WBC NRBC COR # BLD: 8.22 10*3/MM3 (ref 4.5–12.5)

## 2017-06-03 PROCEDURE — 36600 WITHDRAWAL OF ARTERIAL BLOOD: CPT | Performed by: INTERNAL MEDICINE

## 2017-06-03 PROCEDURE — 71010 HC CHEST AP: CPT

## 2017-06-03 PROCEDURE — 25010000002 PROPOFOL 1000 MG/ML EMULSION: Performed by: INTERNAL MEDICINE

## 2017-06-03 PROCEDURE — 82962 GLUCOSE BLOOD TEST: CPT

## 2017-06-03 PROCEDURE — 84484 ASSAY OF TROPONIN QUANT: CPT | Performed by: INTERNAL MEDICINE

## 2017-06-03 PROCEDURE — 94799 UNLISTED PULMONARY SVC/PX: CPT

## 2017-06-03 PROCEDURE — 25010000002 VANCOMYCIN PER 500 MG: Performed by: INTERNAL MEDICINE

## 2017-06-03 PROCEDURE — 25010000002 FUROSEMIDE PER 20 MG: Performed by: INTERNAL MEDICINE

## 2017-06-03 PROCEDURE — 85025 COMPLETE CBC W/AUTO DIFF WBC: CPT | Performed by: INTERNAL MEDICINE

## 2017-06-03 PROCEDURE — 99233 SBSQ HOSP IP/OBS HIGH 50: CPT | Performed by: INTERNAL MEDICINE

## 2017-06-03 PROCEDURE — 99232 SBSQ HOSP IP/OBS MODERATE 35: CPT | Performed by: INTERNAL MEDICINE

## 2017-06-03 PROCEDURE — 82375 ASSAY CARBOXYHB QUANT: CPT | Performed by: INTERNAL MEDICINE

## 2017-06-03 PROCEDURE — 82805 BLOOD GASES W/O2 SATURATION: CPT | Performed by: INTERNAL MEDICINE

## 2017-06-03 PROCEDURE — 86140 C-REACTIVE PROTEIN: CPT | Performed by: INTERNAL MEDICINE

## 2017-06-03 PROCEDURE — 25010000002 CEFEPIME: Performed by: INTERNAL MEDICINE

## 2017-06-03 PROCEDURE — 71010 XR CHEST AP: CPT | Performed by: RADIOLOGY

## 2017-06-03 PROCEDURE — 83050 HGB METHEMOGLOBIN QUAN: CPT | Performed by: INTERNAL MEDICINE

## 2017-06-03 PROCEDURE — 84100 ASSAY OF PHOSPHORUS: CPT | Performed by: INTERNAL MEDICINE

## 2017-06-03 PROCEDURE — 83735 ASSAY OF MAGNESIUM: CPT | Performed by: INTERNAL MEDICINE

## 2017-06-03 PROCEDURE — 80053 COMPREHEN METABOLIC PANEL: CPT | Performed by: INTERNAL MEDICINE

## 2017-06-03 PROCEDURE — 84132 ASSAY OF SERUM POTASSIUM: CPT | Performed by: INTERNAL MEDICINE

## 2017-06-03 PROCEDURE — 25010000002 ENOXAPARIN PER 10 MG: Performed by: INTERNAL MEDICINE

## 2017-06-03 PROCEDURE — 94760 N-INVAS EAR/PLS OXIMETRY 1: CPT

## 2017-06-03 RX ORDER — POTASSIUM CHLORIDE 1.5 G/1.77G
40 POWDER, FOR SOLUTION ORAL EVERY 4 HOURS
Status: COMPLETED | OUTPATIENT
Start: 2017-06-03 | End: 2017-06-03

## 2017-06-03 RX ORDER — BISACODYL 10 MG
10 SUPPOSITORY, RECTAL RECTAL ONCE
Status: COMPLETED | OUTPATIENT
Start: 2017-06-03 | End: 2017-06-03

## 2017-06-03 RX ORDER — FUROSEMIDE 10 MG/ML
40 INJECTION INTRAMUSCULAR; INTRAVENOUS ONCE
Status: COMPLETED | OUTPATIENT
Start: 2017-06-03 | End: 2017-06-03

## 2017-06-03 RX ADMIN — PROPOFOL 40 MCG/KG/MIN: 10 INJECTION, EMULSION INTRAVENOUS at 02:33

## 2017-06-03 RX ADMIN — POTASSIUM CHLORIDE 40 MEQ: 1.5 POWDER, FOR SOLUTION ORAL at 03:46

## 2017-06-03 RX ADMIN — PANTOPRAZOLE SODIUM 40 MG: 40 INJECTION, POWDER, FOR SOLUTION INTRAVENOUS at 05:14

## 2017-06-03 RX ADMIN — CEFEPIME 2 G: 2 INJECTION, POWDER, FOR SOLUTION INTRAVENOUS at 23:53

## 2017-06-03 RX ADMIN — CASTOR OIL AND BALSAM, PERU: 788; 87 OINTMENT TOPICAL at 09:54

## 2017-06-03 RX ADMIN — IPRATROPIUM BROMIDE AND ALBUTEROL SULFATE 3 ML: .5; 3 SOLUTION RESPIRATORY (INHALATION) at 19:03

## 2017-06-03 RX ADMIN — METRONIDAZOLE 500 MG: 500 INJECTION, SOLUTION INTRAVENOUS at 17:54

## 2017-06-03 RX ADMIN — PROPOFOL 35 MCG/KG/MIN: 10 INJECTION, EMULSION INTRAVENOUS at 17:54

## 2017-06-03 RX ADMIN — ASPIRIN 325 MG: 325 TABLET ORAL at 09:54

## 2017-06-03 RX ADMIN — Medication 10 ML: at 09:55

## 2017-06-03 RX ADMIN — POLYETHYLENE GLYCOL 3350 17 G: 17 POWDER, FOR SOLUTION ORAL at 09:53

## 2017-06-03 RX ADMIN — FUROSEMIDE 40 MG: 10 INJECTION, SOLUTION INTRAMUSCULAR; INTRAVENOUS at 09:53

## 2017-06-03 RX ADMIN — CEFEPIME 2 G: 2 INJECTION, POWDER, FOR SOLUTION INTRAVENOUS at 17:54

## 2017-06-03 RX ADMIN — CASTOR OIL AND BALSAM, PERU: 788; 87 OINTMENT TOPICAL at 20:08

## 2017-06-03 RX ADMIN — IPRATROPIUM BROMIDE AND ALBUTEROL SULFATE 3 ML: .5; 3 SOLUTION RESPIRATORY (INHALATION) at 06:37

## 2017-06-03 RX ADMIN — CEFEPIME 2 G: 2 INJECTION, POWDER, FOR SOLUTION INTRAVENOUS at 09:54

## 2017-06-03 RX ADMIN — PROPOFOL 40 MCG/KG/MIN: 10 INJECTION, EMULSION INTRAVENOUS at 09:53

## 2017-06-03 RX ADMIN — Medication 10 ML: at 20:09

## 2017-06-03 RX ADMIN — IPRATROPIUM BROMIDE AND ALBUTEROL SULFATE 3 ML: .5; 3 SOLUTION RESPIRATORY (INHALATION) at 00:53

## 2017-06-03 RX ADMIN — METRONIDAZOLE 500 MG: 500 INJECTION, SOLUTION INTRAVENOUS at 09:54

## 2017-06-03 RX ADMIN — PROPOFOL 45 MCG/KG/MIN: 10 INJECTION, EMULSION INTRAVENOUS at 21:30

## 2017-06-03 RX ADMIN — ATORVASTATIN CALCIUM 20 MG: 20 TABLET, FILM COATED ORAL at 20:08

## 2017-06-03 RX ADMIN — IPRATROPIUM BROMIDE AND ALBUTEROL SULFATE 3 ML: .5; 3 SOLUTION RESPIRATORY (INHALATION) at 12:35

## 2017-06-03 RX ADMIN — BISACODYL 10 MG: 10 SUPPOSITORY RECTAL at 09:53

## 2017-06-03 RX ADMIN — PROPOFOL 25 MCG/KG/MIN: 10 INJECTION, EMULSION INTRAVENOUS at 13:55

## 2017-06-03 RX ADMIN — METRONIDAZOLE 500 MG: 500 INJECTION, SOLUTION INTRAVENOUS at 23:53

## 2017-06-03 RX ADMIN — VANCOMYCIN HYDROCHLORIDE 1250 MG: 5 INJECTION, POWDER, LYOPHILIZED, FOR SOLUTION INTRAVENOUS at 05:14

## 2017-06-03 RX ADMIN — POTASSIUM CHLORIDE 40 MEQ: 1.5 POWDER, FOR SOLUTION ORAL at 06:04

## 2017-06-03 RX ADMIN — PROPOFOL 40 MCG/KG/MIN: 10 INJECTION, EMULSION INTRAVENOUS at 05:14

## 2017-06-03 RX ADMIN — ENOXAPARIN SODIUM 40 MG: 40 INJECTION SUBCUTANEOUS at 20:08

## 2017-06-03 RX ADMIN — VANCOMYCIN HYDROCHLORIDE 1250 MG: 5 INJECTION, POWDER, LYOPHILIZED, FOR SOLUTION INTRAVENOUS at 17:54

## 2017-06-03 NOTE — PLAN OF CARE
Problem: Mechanical Ventilation, Invasive (Adult)  Goal: Signs and Symptoms of Listed Potential Problems Will be Absent or Manageable (Mechanical Ventilation, Invasive)  Outcome: Ongoing (interventions implemented as appropriate)    06/03/17 0942   Mechanical Ventilation, Invasive   Problems Assessed (Mechanical Ventilation, Invasive) all   Problems Present (Mechanical Ventilation, Invasive) inability to wean         Problem: Sepsis (Adult)  Goal: Signs and Symptoms of Listed Potential Problems Will be Absent or Manageable (Sepsis)  Outcome: Ongoing (interventions implemented as appropriate)    06/03/17 0942   Sepsis   Problems Assessed (Sepsis) all   Problems Present (Sepsis) none         Problem: Skin Integrity Impairment, Risk/Actual (Adult)  Goal: Identify Related Risk Factors and Signs and Symptoms  Outcome: Ongoing (interventions implemented as appropriate)    06/03/17 0942   Skin Integrity Impairment, Risk/Actual   Skin Integrity Impairment, Risk/Actual: Related Risk Factors age extremes;cognitive impairment;immobility       Goal: Skin Integrity/Wound Healing  Outcome: Ongoing (interventions implemented as appropriate)    06/03/17 0942   Skin Integrity Impairment, Risk/Actual (Adult)   Skin Integrity/Wound Healing making progress toward outcome         Problem: Pneumonia (Adult)  Goal: Signs and Symptoms of Listed Potential Problems Will be Absent or Manageable (Pneumonia)  Outcome: Ongoing (interventions implemented as appropriate)    06/03/17 0942   Pneumonia   Problems Assessed (Pneumonia) all   Problems Present (Pneumonia) respiratory compromise         Problem: Patient Care Overview (Adult)  Goal: Plan of Care Review  Outcome: Ongoing (interventions implemented as appropriate)    06/03/17 0942   Coping/Psychosocial Response Interventions   Plan Of Care Reviewed With patient   Patient Care Overview   Progress progress towards functional goals is fair       Goal: Discharge Needs Assessment  Outcome:  Ongoing (interventions implemented as appropriate)    06/03/17 0942   Discharge Needs Assessment   Discharge Disposition still a patient         Problem: Fall Risk (Adult)  Goal: Identify Related Risk Factors and Signs and Symptoms  Outcome: Ongoing (interventions implemented as appropriate)    06/03/17 0942   Fall Risk   Fall Risk: Related Risk Factors age-related changes;confusion/agitation   Fall Risk: Signs and Symptoms presence of risk factors       Goal: Absence of Falls  Outcome: Ongoing (interventions implemented as appropriate)    06/03/17 0942   Fall Risk (Adult)   Absence of Falls making progress toward outcome         Problem: Pressure Ulcer (Adult)  Goal: Signs and Symptoms of Listed Potential Problems Will be Absent or Manageable (Pressure Ulcer)  Outcome: Ongoing (interventions implemented as appropriate)    06/03/17 0942   Pressure Ulcer   Problems Assessed (Pressure Ulcer) all   Problems Present (Pressure Ulcer) none

## 2017-06-03 NOTE — PROGRESS NOTES
Subjective     History:   Mayra Hays is a 71 y.o. female admitted on 5/30/2017 secondary to Sepsis due to pneumonia     Procedures:   Intubation in the ED  6/2/17: Bronchoscopy revealing mucopurulent secretions in the RLL and LLL    Patient seen and examined with JAMES Ma. Sedated on vent support. Hypotension improved from yesterday. Pt diuresed well yesterday but UOP decreased overnight per RN. TF's remain on hold 2/2 residuals. No BM. Episode of hypoglycemia last evening requiring D50.     History taken from: chart, and RN.      Objective     Vital Signs  Temp:  [98.6 °F (37 °C)-98.9 °F (37.2 °C)] 98.7 °F (37.1 °C)  Heart Rate:  [] 68  Resp:  [18-24] 18  BP: ()/(38-88) 120/63  FiO2 (%):  [40 %-100 %] 40 %    Intake/Output Summary (Last 24 hours) at 06/03/17 0826  Last data filed at 06/03/17 0600   Gross per 24 hour   Intake          2083.85 ml   Output             3380 ml   Net         -1296.15 ml         Physical Exam:  General:    Sedated on vent support    Heart:      Normal S1 and S2. Regular rate and rhythm. No significant murmur, rubs or gallops appreciated.   Lungs:     Respirations regular, even and unlabored. Coarse breath sounds throughout. No wheezes.    Abdomen:   Soft and nontender. No guarding, rebound tenderness or  organomegaly noted. Bowel sounds present x 4.   Extremities:  1+ LE edema B/L.      Results Review:      Results from last 7 days  Lab Units 06/03/17  0058 06/02/17 0315 06/01/17 0527 05/31/17 0115 05/30/17  1649   WBC 10*3/mm3 8.22 11.48 12.76* 13.99* 18.80*   HEMOGLOBIN g/dL 9.9* 10.7* 11.4* 12.5 14.4   PLATELETS 10*3/mm3 218 194 203 204 298       Results from last 7 days  Lab Units 06/03/17  0058 06/02/17 0315 06/01/17 0527 05/31/17  0115 05/30/17  1649   SODIUM mmol/L 145 143 142 141 138   POTASSIUM mmol/L 3.6 4.0 3.9 3.6 5.5*   CHLORIDE mmol/L 116* 118* 115* 108 104   TOTAL CO2 mmol/L 25.9 23.6* 25.5 24.7 31.4   BUN mg/dL 23* 16 19 20 13   CREATININE mg/dL 0.64  0.62 0.68 1.17 1.02   CALCIUM mg/dL 8.6 8.6 8.6 8.9 9.2   GLUCOSE mg/dL 100 111* 205* 201* 308*       Results from last 7 days  Lab Units 06/03/17  0058 06/02/17  0315 06/01/17  0527 05/31/17  0115 05/30/17  1649   BILIRUBIN mg/dL 0.2 0.2 0.2 0.4 0.5   ALK PHOS U/L 56 60 67 84 106*   AST (SGOT) U/L 13 14 17 44* 54*   ALT (SGPT) U/L 12 14 21 33 34       Results from last 7 days  Lab Units 06/03/17  0058 06/02/17  0315 06/01/17 0527 05/31/17  0446   MAGNESIUM mg/dL 2.3 2.4 2.6 1.6*       Results from last 7 days  Lab Units 06/01/17  0527 05/31/17  0446 05/31/17  0115 05/30/17  1649   INR  1.02 1.03 0.98 0.93       Results from last 7 days  Lab Units 06/03/17  0058 06/02/17  1945 06/02/17  1448 06/01/17  0527 05/31/17  1101 05/31/17  0446 05/30/17  2331   CK TOTAL U/L  --   --  22* 33  --  45 51   TROPONIN I ng/mL 0.117* 0.126* 0.145* 0.220* 0.450* 0.743* 1.117*   CK MB INDEX %  --   --  1.4 5.9*  --   --  5.6*   MYOGLOBIN ng/mL  --   --   --  47.0 91.0 75.0 98.0       Imaging Results (last 24 hours)     Procedure Component Value Units Date/Time    XR Chest AP [[id number]] Updated:  06/03/17 0555            Medications:    aspirin 325 mg Oral Daily   atorvastatin 20 mg Oral Nightly   bisacodyl 10 mg Rectal Once   castor oil-balsam peru  Topical Q12H   cefepime 2 g Intravenous Q8H   enoxaparin 40 mg Subcutaneous Nightly   furosemide 40 mg Intravenous Once   ipratropium-albuterol 3 mL Nebulization 4x Daily - RT   metroNIDAZOLE 500 mg Intravenous Q8H   pantoprazole 40 mg Intravenous Q AM   Pharmacy Meds to Bed Consult  Does not apply Daily   polyethylene glycol 17 g Oral Daily   sodium chloride 10 mL Intracatheter Q12H   sodium chloride 10 mL Intracatheter Q12H   vancomycin 1,250 mg Intravenous Q12H       DOPamine 5 mcg/kg/min Last Rate: Stopped (06/01/17 1840)   propofol 5-50 mcg/kg/min Last Rate: 40 mcg/kg/min (06/03/17 0514)           Assessment/Plan   Severe sepsis: Likely 2/2 bilateral pneumonia (R>L). 1/2 blood  cultures revealing GPC but possibly a contaminant as other cultures remain negative. Hypotension improved today. WBC improved and CRP slightly worse today. Repeat CXR today appears slightly improved following the bronch yesterday (per my interpretation). Cont broad spectrum IV antibiotics per ID. Cont to follow culture and repeat labs in the AM. Repeat CXR in the AM. ID and pulm input appreciated.     Acute hypoxic and hypercapnic respiratory failure: Likely 2/2 sepsis, pneumonia and COPD exacerbation. Cont broad spectrum IV antibiotics, nebs and vent support. Pt was alkalotic on ABG this AM and rate was decreased from 22 to 18. She is now slightly acidotic on repeat ABG and I have increased her rate to 20. Repeat ABG later this AM. Will order a dose of Lasix this AM as she diuresed well yesterday. Repeat labs, ABG and CXR in the AM.  May attempt a weaning trial tomorrow if she remains stable. Pulm following with input appreciated.     NSTEMI: Likely type II in setting of respiratory failure. Echo obtained revealing an EF of 55-60%, grade II diastolic dysfunction, trace-to-mild MR, mild TR and mild pulmonary HTN. Cont management per cardiology. Currently on ASA and statin. No beta blocerk 2/2 bradycardia and hypotension. Plan for additional ischemic workup once her respiratory status improves per cardiology. Cont to monitor on telemetry. Cardiology input appreciated.     Mild hyperkalemia: Resolved. Now borderline hypokalemic. Repeat labs in the AM.     Sinus bradycardia: Metoprolol D/C'd. Cont to monitor on telemetry.     CKD III: Cont to monitor closely in the setting of diuresis. Repeat labs in the AM.     Constipation: Cont Miralax. Dulcolax supp x 1 today.     PPX  GI: PPI  DVT: SQ Lovenox      Pt is at high risk 2/2 severe sepsis, respiratory failure, pneumonia, COPD exacerbation, NSTEMI and hx of heavy tobacco abuse.       Critical Care Time: 32 minutes         Madhu Hoffmann DO  06/03/17  8:26 AM

## 2017-06-03 NOTE — PROGRESS NOTES
"  I have personally seen and examined the patient today and discussed overnight interval progress and pertinent issues with nursing staff.    Review of Systems-unable to obtain as the patient is sedated and intubated on the ventilator    History taken from: chart RN      Vital Signs    /63 (BP Location: Left arm, Patient Position: Lying)  Pulse 65  Temp 98.7 °F (37.1 °C) (Oral)   Resp 19  Ht 68\" (172.7 cm)  Wt 232 lb (105 kg)  SpO2 99%  BMI 35.28 kg/m2    Temp:  [98.6 °F (37 °C)-98.9 °F (37.2 °C)] 98.7 °F (37.1 °C)      Intake/Output Summary (Last 24 hours) at 06/03/17 1020  Last data filed at 06/03/17 0600   Gross per 24 hour   Intake          1883.85 ml   Output             1680 ml   Net           203.85 ml     Intake & Output (last 3 days)       05/31 0701 - 06/01 0700 06/01 0701 - 06/02 0700 06/02 0701 - 06/03 0700 06/03 0701 - 06/04 0700    I.V. (mL/kg) 3213.4 (30.7) 3048 (28.5) 633.9 (6)     Other 200 100 300     NG/       IV Piggyback 1100 1100 1150     Total Intake(mL/kg) 4848.4 (46.3) 4248 (39.7) 2083.9 (19.8)     Urine (mL/kg/hr) 2705 (1.1) 3235 (1.3) 2680 (1.1)     Other  500 (0.2) 700 (0.3)     Total Output 2705 3735 3380      Net +2143.4 +513 -1296.2                     Physical Exam:      General Appearance:  Intubated, sedated, face is red    Head:  Normocephalic, without obvious abnormality, atraumatic   Eyes:      Lids and lashes normal, conjunctivae and sclerae normal, no icterus, no pallor, corneas clear, PERRLA   Ears:  Ears appear intact with no abnormalities noted   Throat: No oral lesions, no thrush, oral mucosa moist   Neck: No adenopathy, supple, trachea midline, no thyromegaly, no carotid bruit, no JVD   Back:  No tenderness to percussion or palpation, range of motion normal   Lungs:  Wheezing bilaterally on inspiration    Heart:  Regular rhythm and normal rate, normal S1 and S2, no murmur, no gallop, no rub, no click   Chest Wall:  No abnormalities observed   Abdomen:  " Distended, Normal bowel sounds, no masses, no organomegaly, non-tender, no guarding, no rebound tenderness   Rectal:  Deferred   Extremities: Moves all extremities well, no edema, no cyanosis, no redness   Pulses: Pulses palpable and equal bilaterally   Skin: No bleeding, bruising or rash   Lymph nodes: No palpable adenopathy   Neurologic: Sedated            Results:      Results from last 7 days  Lab Units 06/03/17  0058 06/02/17  0315 06/01/17  0527 05/31/17  0115 05/30/17  1649   WBC 10*3/mm3 8.22 11.48 12.76* 13.99* 18.80*     Lab Results   Component Value Date    NEUTROABS 5.73 06/03/2017         Results from last 7 days  Lab Units 06/03/17 0058   CREATININE mg/dL 0.64         Results from last 7 days  Lab Units 06/03/17  0059 06/02/17 0315 06/01/17  0527   CRP mg/dL 10.69* 9.33* 10.96*       Results Review:    I have personally reviewed laboratory data, culture results, radiology studies and antimicrobial therapy.    Hospital Medications (active)       Dose Frequency Start End    aspirin tablet 325 mg 325 mg Daily 5/31/2017     Sig - Route: Take 1 tablet by mouth Daily. - Oral    atorvastatin (LIPITOR) tablet 20 mg 20 mg Nightly 5/31/2017     Sig - Route: Take 1 tablet by mouth Every Night. - Oral    castor oil-balsam peru (VENELEX) ointment  Every 12 Hours Scheduled 5/31/2017     Sig - Route: Apply  topically Every 12 (Twelve) Hours. - Topical    cefepime (MAXIPIME) 2 g/100 mL 0.9% NS (mbp) 2 g Every 8 Hours Scheduled 5/31/2017     Sig - Route: Infuse 100 mL into a venous catheter Every 8 (Eight) Hours. - Intravenous    DOPamine 400 mg/250 mL (1.6 mg/mL) infusion 5 mcg/kg/min × 107 kg Titrated 5/31/2017     Sig - Route: Infuse 535 mcg/min into a venous catheter Dose Adjusted By Provider As Needed. - Intravenous    enoxaparin (LOVENOX) syringe 40 mg 40 mg Nightly 6/1/2017     Sig - Route: Inject 0.4 mL under the skin Every Night. - Subcutaneous    furosemide (LASIX) injection 60 mg 60 mg Once 6/2/2017  "6/2/2017    Sig - Route: Infuse 6 mL into a venous catheter 1 (One) Time. - Intravenous    ipratropium-albuterol (DUO-NEB) nebulizer solution 3 mL 3 mL 4 Times Daily - RT 5/31/2017     Sig - Route: Take 3 mL by nebulization 4 (Four) Times a Day. - Nebulization    Magnesium Sulfate 2 gram Bolus, followed by 8 gram infusion (total Mg dose 10 grams)- Mg less than or equal to 1mg/dL 2 g As Needed 5/31/2017     Sig - Route: Infuse 50 mL into a venous catheter As Needed (Mg less than or equal to 1mg/dL). - Intravenous    Linked Group 1:  \"Or\" Linked Group Details        magnesium sulfate 4 gram infusion- Mg 1.6-1.9 mg/dL 4 g As Needed 5/31/2017     Sig - Route: Infuse 100 mL into a venous catheter As Needed (Mg 1.6-1.9 mg/dL). - Intravenous    Linked Group 1:  \"Or\" Linked Group Details        Magnesium Sulfate 6 gram Infusion (2 gm x 3) -Mg 1.1 -1.5 mg/dL 2 g As Needed 5/31/2017     Sig - Route: Infuse 50 mL into a venous catheter As Needed (Mg 1.1 -1.5 mg/dL). - Intravenous    Linked Group 1:  \"Or\" Linked Group Details        metroNIDAZOLE (FLAGYL) IVPB 500 mg 500 mg Every 8 Hours Scheduled 5/31/2017     Sig - Route: Infuse 100 mL into a venous catheter Every 8 (Eight) Hours. - Intravenous    Pharmacy Meds to Bed Consult  Daily 5/31/2017     Sig - Route: Daily. - Does not apply    potassium chloride (KLOR-CON) packet 40 mEq 40 mEq As Needed 5/31/2017     Sig - Route: Take 40 mEq by mouth As Needed (potassium replacement, see admin instructions). - Oral    Linked Group 2:  \"Or\" Linked Group Details        potassium chloride (KLOR-CON) packet 40 mEq 40 mEq Once 6/2/2017 6/2/2017    Sig - Route: Take 40 mEq by mouth 1 (One) Time. - Oral    potassium chloride (MICRO-K) CR capsule 40 mEq 40 mEq As Needed 5/31/2017     Sig - Route: Take 4 capsules by mouth As Needed (potassium replacement.  see admin instructions). - Oral    Linked Group 2:  \"Or\" Linked Group Details        potassium chloride 10 mEq in 100 mL IVPB 10 mEq Every " "1 Hour PRN 5/31/2017     Sig - Route: Infuse 100 mL into a venous catheter Every 1 (One) Hour As Needed (potassium protocol PERIPHERAL - see admin instructions). - Intravenous    Linked Group 2:  \"Or\" Linked Group Details        propofol (DIPRIVAN) infusion 10 mg/mL 100 mL 5-50 mcg/kg/min × 107 kg Titrated 5/31/2017     Sig - Route: Infuse 535-5,350 mcg/min into a venous catheter Dose Adjusted By Provider As Needed. - Intravenous    Notes to Pharmacy: Order received from Dr Peter to start diprivan per protocol if patient remains bradycardic on precedex IV    sodium chloride (NS) 0.9 % irrigation solution  - ADS Override Pull   6/2/2017 6/2/2017    Notes to Pharmacy: Created by cabinet override    sodium chloride 0.9 % flush 1-10 mL 1-10 mL As Needed 5/30/2017     Sig - Route: Infuse 1-10 mL into a venous catheter As Needed for Line Care. - Intravenous    vancomycin (VANCOCIN) 1,250 mg in sodium chloride 0.9 % 250 mL IVPB 1,250 mg Every 18 Hours 6/1/2017     Sig - Route: Infuse 1,250 mg into a venous catheter Every 18 (Eighteen) Hours. - Intravenous    dexmedetomidine HCl (PRECEDEX) 400 mcg in sodium chloride 0.9 % 100 mL (4 mcg/mL) infusion (Discontinued) 0.2-1.5 mcg/kg/hr × 107 kg Titrated 5/31/2017 6/2/2017    Sig - Route: Infuse 21.4-160.5 mcg/hr into a venous catheter Dose Adjusted By Provider As Needed. - Intravenous    doxycycline (VIBRAMYCIN) 100 mg/100 mL 0.9% NS MBP (Discontinued) 100 mg Every 12 Hours 5/31/2017 6/1/2017    Sig - Route: Infuse 100 mL into a venous catheter Every 12 (Twelve) Hours. - Intravenous    heparin (porcine) 5000 UNIT/ML injection 2,500 Units (Discontinued) 23.4 Units/kg × 107 kg As Needed 5/31/2017 6/1/2017    Sig - Route: Infuse 0.5 mL into a venous catheter As Needed (Per Heparin Nomogram For PTT 45-55). - Intravenous    Reason for Discontinue: Drug interaction    heparin (porcine) 5000 UNIT/ML injection 5,000 Units (Discontinued) 46.7 Units/kg × 107 kg As Needed 5/31/2017 " 6/1/2017    Sig - Route: Infuse 1 mL into a venous catheter As Needed (Per Heparin Nomogram For PTT Less Than 45). - Intravenous    Reason for Discontinue: Drug interaction    heparin 34833 units/250 mL (100 units/mL) in 0.45 % NaCl infusion (Discontinued) 9.35 Units/kg/hr × 81.1 kg (Adjusted) Titrated 5/31/2017 6/1/2017    Sig - Route: Infuse 758.285 Units/hr into a venous catheter Dose Adjusted By Provider As Needed. - Intravenous    sodium chloride 0.9 % infusion (Discontinued) 100 mL/hr Continuous 5/30/2017 6/2/2017    Sig - Route: Infuse 100 mL/hr into a venous catheter Continuous. - Intravenous            Cultures:    Blood Culture   Date Value Ref Range Status   05/31/2017 No growth at 24 hours  Preliminary   05/31/2017 No growth at 24 hours  Preliminary   05/30/2017 No growth at 2 days  Preliminary   05/30/2017 Gram positive cocci, consistent with Staph spp (A)  Preliminary       PROBLEM LIST:    Patient Active Problem List   Diagnosis   • Pneumonia   • Sepsis due to pneumonia       Assessment/Plan     ASSESSMENT:    1. Sepsis  2. Staph bacteremia  3. Pneumonia     PLAN:    The patient had a bronchoscopy yesterday which may cause the increase of CRP level.  We'll continue to follow closely.  Would recommend to continue with cefepime, Flagyl and vancomycin for now.    Blood culture from 5/30/2017 with staph not yet further identified.    The positive blood culture one set out of 2 could be a contaminant and we will de-escalate when cultures have finalized.      Patients findings and recommendations were discussed with nursing staff    Code Status: Full Code    Rosalia Pinto PA-C  06/03/17  10:20 AM

## 2017-06-03 NOTE — PROGRESS NOTES
LOS: 4 days   Patient Care Team:  ERASTO Lee as PCP - General (Family Medicine)    Chief Complaint:Mayra Hays a 71 y.o. female presented with a pneumonia and was noted to have a question of a trivial increase in her troponin levels however, her total CK and her CK-MB were very low.       Interval History: There is been no clinical change.      Objective   Vital Signs  Temp:  [98.6 °F (37 °C)-98.9 °F (37.2 °C)] 98.7 °F (37.1 °C)  Heart Rate:  [] 65  Resp:  [18-24] 19  BP: ()/(47-88) 120/63  FiO2 (%):  [40 %] 40 %    Intake/Output Summary (Last 24 hours) at 06/03/17 1109  Last data filed at 06/03/17 0600   Gross per 24 hour   Intake          1883.85 ml   Output             1680 ml   Net           203.85 ml       She is intubated and sedated.  PERRL, conjunctiva clear  Neck supple, no JVD or thyromegaly appreciated  S1/S2 RRR, no m/r/g  Lungs CTA B, normal effort  Abdomen S/NT/ND (+) BS, no HSM appreciated  Extremities warm, no clubbing, cyanosis, or edema  No visible or palpable skin lesions  She is intubated and sedated as such her neurologic exam cannot be assessed.    Results Review:        Results from last 7 days  Lab Units 06/03/17  0921 06/03/17  0058 06/02/17  0315 06/01/17  0527   SODIUM mmol/L  --  145 143 142   POTASSIUM mmol/L 4.4 3.6 4.0 3.9   CHLORIDE mmol/L  --  116* 118* 115*   TOTAL CO2 mmol/L  --  25.9 23.6* 25.5   BUN mg/dL  --  23* 16 19   CREATININE mg/dL  --  0.64 0.62 0.68   GLUCOSE mg/dL  --  100 111* 205*   CALCIUM mg/dL  --  8.6 8.6 8.6       Results from last 7 days  Lab Units 06/03/17  0058 06/02/17  1945 06/02/17  1448 06/01/17  0527  05/31/17  0446 05/30/17  2331   CK TOTAL U/L  --   --  22* 33  --  45 51   TROPONIN I ng/mL 0.117* 0.126* 0.145* 0.220*  < > 0.743* 1.117*   CK MB INDEX %  --   --  1.4 5.9*  --   --  5.6*   < > = values in this interval not displayed.    Results from last 7 days  Lab Units 06/03/17  0058 06/02/17  0315 06/01/17  0527   WBC  10*3/mm3 8.22 11.48 12.76*   HEMOGLOBIN g/dL 9.9* 10.7* 11.4*   HEMATOCRIT % 32.8* 35.1* 36.9*   PLATELETS 10*3/mm3 218 194 203       Results from last 7 days  Lab Units 06/01/17  2039 06/01/17  1211 06/01/17  0527  05/31/17  0446 05/31/17  0115   INR   --   --  1.02  --  1.03 0.98   APTT seconds 62.6* 53.0* 93.0*  < >  --  25.4   < > = values in this interval not displayed.    Results from last 7 days  Lab Units 06/01/17  0527   CHOLESTEROL mg/dL 111       Results from last 7 days  Lab Units 06/03/17  0058   MAGNESIUM mg/dL 2.3       Results from last 7 days  Lab Units 06/01/17  0527   CHOLESTEROL mg/dL 111   TRIGLYCERIDES mg/dL 136   HDL CHOL mg/dL 34*       I reviewed the patient's new clinical results.  I personally viewed and interpreted the patient's EKG/Telemetry data        Medication Review:     aspirin 325 mg Oral Daily   atorvastatin 20 mg Oral Nightly   castor oil-balsam peru  Topical Q12H   cefepime 2 g Intravenous Q8H   enoxaparin 40 mg Subcutaneous Nightly   ipratropium-albuterol 3 mL Nebulization 4x Daily - RT   metroNIDAZOLE 500 mg Intravenous Q8H   pantoprazole 40 mg Intravenous Q AM   Pharmacy Meds to Bed Consult  Does not apply Daily   polyethylene glycol 17 g Oral Daily   sodium chloride 10 mL Intracatheter Q12H   sodium chloride 10 mL Intracatheter Q12H   vancomycin 1,250 mg Intravenous Q12H         DOPamine 5 mcg/kg/min Last Rate: Stopped (06/01/17 1840)   propofol 5-50 mcg/kg/min Last Rate: 40 mcg/kg/min (06/03/17 0953)       Principal Problem:    Sepsis due to pneumonia  Active Problems:    Pneumonia      Assessment/Plan   ?NSTEMI  Given that her CK and CK-MB did not appreciably increase to any extent whatsoever I suspect that her trivial troponin bump is clinically irrelevant.  It may be reasonable to consider a stress test when she has completely recovered.    Paul Awan MD  06/03/17  11:09 AM

## 2017-06-04 ENCOUNTER — APPOINTMENT (OUTPATIENT)
Dept: GENERAL RADIOLOGY | Facility: HOSPITAL | Age: 72
End: 2017-06-04

## 2017-06-04 LAB
A-A DO2: 125.3 MMHG (ref 0–300)
A-A DO2: 131.4 MMHG (ref 0–300)
ALBUMIN SERPL-MCNC: 3.1 G/DL (ref 3.4–4.8)
ALBUMIN/GLOB SERPL: 0.9 G/DL (ref 1.5–2.5)
ALP SERPL-CCNC: 58 U/L (ref 35–104)
ALT SERPL W P-5'-P-CCNC: 14 U/L (ref 10–36)
ANION GAP SERPL CALCULATED.3IONS-SCNC: 3 MMOL/L (ref 3.6–11.2)
ARTERIAL PATENCY WRIST A: ABNORMAL
ARTERIAL PATENCY WRIST A: POSITIVE
AST SERPL-CCNC: 22 U/L (ref 10–30)
ATMOSPHERIC PRESS: 734 MMHG
ATMOSPHERIC PRESS: 734 MMHG
BACTERIA SPEC AEROBE CULT: NORMAL
BASE EXCESS BLDA CALC-SCNC: -2.5 MMOL/L
BASE EXCESS BLDA CALC-SCNC: -4.9 MMOL/L
BASOPHILS # BLD AUTO: 0.08 10*3/MM3 (ref 0–0.3)
BASOPHILS NFR BLD AUTO: 1.1 % (ref 0–2)
BDY SITE: ABNORMAL
BDY SITE: ABNORMAL
BILIRUB SERPL-MCNC: 0.2 MG/DL (ref 0.2–1.8)
BODY TEMPERATURE: 98.6 C
BODY TEMPERATURE: 98.6 C
BUN BLD-MCNC: 23 MG/DL (ref 7–21)
BUN/CREAT SERPL: 41.1 (ref 7–25)
CALCIUM SPEC-SCNC: 9.1 MG/DL (ref 7.7–10)
CHLORIDE SERPL-SCNC: 117 MMOL/L (ref 99–112)
CO2 SERPL-SCNC: 25 MMOL/L (ref 24.3–31.9)
COHGB MFR BLD: 0.7 % (ref 0–5)
COHGB MFR BLD: 1.5 % (ref 0–5)
CREAT BLD-MCNC: 0.56 MG/DL (ref 0.43–1.29)
CRP SERPL-MCNC: 8.09 MG/DL (ref 0–0.99)
DEPRECATED RDW RBC AUTO: 45.7 FL (ref 37–54)
EOSINOPHIL # BLD AUTO: 0.39 10*3/MM3 (ref 0–0.7)
EOSINOPHIL NFR BLD AUTO: 5.3 % (ref 0–7)
ERYTHROCYTE [DISTWIDTH] IN BLOOD BY AUTOMATED COUNT: 14.2 % (ref 11.5–14.5)
GFR SERPL CREATININE-BSD FRML MDRD: 107 ML/MIN/1.73
GLOBULIN UR ELPH-MCNC: 3.5 GM/DL
GLUCOSE BLD-MCNC: 94 MG/DL (ref 70–110)
GLUCOSE BLDC GLUCOMTR-MCNC: 154 MG/DL (ref 70–130)
GLUCOSE BLDC GLUCOMTR-MCNC: 80 MG/DL (ref 70–130)
GLUCOSE BLDC GLUCOMTR-MCNC: 85 MG/DL (ref 70–130)
HCO3 BLDA-SCNC: 21.5 MMOL/L (ref 22–26)
HCO3 BLDA-SCNC: 22.9 MMOL/L (ref 22–26)
HCT VFR BLD AUTO: 34.9 % (ref 37–47)
HCT VFR BLD CALC: 36 % (ref 37–47)
HCT VFR BLD CALC: 41 % (ref 37–47)
HGB BLD-MCNC: 10.4 G/DL (ref 12–16)
HGB BLDA-MCNC: 12.2 G/DL (ref 12–16)
HGB BLDA-MCNC: 13.9 G/DL (ref 12–16)
HOROWITZ INDEX BLD+IHG-RTO: 40 %
HOROWITZ INDEX BLD+IHG-RTO: 40 %
IMM GRANULOCYTES # BLD: 0.3 10*3/MM3 (ref 0–0.03)
IMM GRANULOCYTES NFR BLD: 4.1 % (ref 0–0.5)
LYMPHOCYTES # BLD AUTO: 1.11 10*3/MM3 (ref 1–3)
LYMPHOCYTES NFR BLD AUTO: 15.1 % (ref 16–46)
MAGNESIUM SERPL-MCNC: 2.4 MG/DL (ref 1.7–2.6)
MCH RBC QN AUTO: 26.3 PG (ref 27–33)
MCHC RBC AUTO-ENTMCNC: 29.8 G/DL (ref 33–37)
MCV RBC AUTO: 88.4 FL (ref 80–94)
METHGB BLD QL: 0.2 % (ref 0–3)
METHGB BLD QL: 0.4 % (ref 0–3)
MODALITY: ABNORMAL
MODALITY: ABNORMAL
MONOCYTES # BLD AUTO: 0.83 10*3/MM3 (ref 0.1–0.9)
MONOCYTES NFR BLD AUTO: 11.3 % (ref 0–12)
NEUTROPHILS # BLD AUTO: 4.62 10*3/MM3 (ref 1.4–6.5)
NEUTROPHILS NFR BLD AUTO: 63.1 % (ref 40–75)
OSMOLALITY SERPL CALC.SUM OF ELEC: 292.1 MOSM/KG (ref 273–305)
OXYHGB MFR BLDV: 95.1 % (ref 85–100)
OXYHGB MFR BLDV: 96 % (ref 85–100)
PCO2 BLDA: 41.9 MM HG (ref 35–45)
PCO2 BLDA: 45 MM HG (ref 35–45)
PEEP RESPIRATORY: 5 CM[H2O]
PEEP RESPIRATORY: 5 CM[H2O]
PH BLDA: 7.3 PH UNITS (ref 7.35–7.45)
PH BLDA: 7.36 PH UNITS (ref 7.35–7.45)
PHOSPHATE SERPL-MCNC: 4.6 MG/DL (ref 2.7–4.5)
PLATELET # BLD AUTO: 227 10*3/MM3 (ref 130–400)
PMV BLD AUTO: 12 FL (ref 6–10)
PO2 BLDA: 95.2 MM HG (ref 80–100)
PO2 BLDA: 97.8 MM HG (ref 80–100)
POTASSIUM BLD-SCNC: 4 MMOL/L (ref 3.5–5.3)
PROT SERPL-MCNC: 6.6 G/DL (ref 6–8)
RBC # BLD AUTO: 3.95 10*6/MM3 (ref 4.2–5.4)
SAO2 % BLDCOA: 96.9 % (ref 90–100)
SAO2 % BLDCOA: 96.9 % (ref 90–100)
SET MECH RESP RATE: 20
SET MECH RESP RATE: 21
SODIUM BLD-SCNC: 145 MMOL/L (ref 135–153)
VANCOMYCIN TROUGH SERPL-MCNC: 16.8 MCG/ML (ref 5–15)
VENTILATOR MODE: AC
VENTILATOR MODE: AC
VT ON VENT VENT: 400 ML
VT ON VENT VENT: 400 ML
WBC NRBC COR # BLD: 7.33 10*3/MM3 (ref 4.5–12.5)

## 2017-06-04 PROCEDURE — 82375 ASSAY CARBOXYHB QUANT: CPT | Performed by: INTERNAL MEDICINE

## 2017-06-04 PROCEDURE — 25010000002 CEFEPIME: Performed by: INTERNAL MEDICINE

## 2017-06-04 PROCEDURE — 25010000002 METHYLPREDNISOLONE PER 40 MG: Performed by: INTERNAL MEDICINE

## 2017-06-04 PROCEDURE — 25010000002 PROPOFOL 1000 MG/ML EMULSION: Performed by: INTERNAL MEDICINE

## 2017-06-04 PROCEDURE — 83050 HGB METHEMOGLOBIN QUAN: CPT | Performed by: INTERNAL MEDICINE

## 2017-06-04 PROCEDURE — 94799 UNLISTED PULMONARY SVC/PX: CPT

## 2017-06-04 PROCEDURE — 71010 XR CHEST AP: CPT | Performed by: RADIOLOGY

## 2017-06-04 PROCEDURE — 82805 BLOOD GASES W/O2 SATURATION: CPT | Performed by: INTERNAL MEDICINE

## 2017-06-04 PROCEDURE — 85025 COMPLETE CBC W/AUTO DIFF WBC: CPT | Performed by: INTERNAL MEDICINE

## 2017-06-04 PROCEDURE — 25010000002 ENOXAPARIN PER 10 MG: Performed by: INTERNAL MEDICINE

## 2017-06-04 PROCEDURE — 86140 C-REACTIVE PROTEIN: CPT | Performed by: INTERNAL MEDICINE

## 2017-06-04 PROCEDURE — 82962 GLUCOSE BLOOD TEST: CPT

## 2017-06-04 PROCEDURE — 25010000002 METOCLOPRAMIDE PER 10 MG: Performed by: INTERNAL MEDICINE

## 2017-06-04 PROCEDURE — 84100 ASSAY OF PHOSPHORUS: CPT | Performed by: INTERNAL MEDICINE

## 2017-06-04 PROCEDURE — 80053 COMPREHEN METABOLIC PANEL: CPT | Performed by: INTERNAL MEDICINE

## 2017-06-04 PROCEDURE — 25010000002 VANCOMYCIN PER 500 MG: Performed by: INTERNAL MEDICINE

## 2017-06-04 PROCEDURE — 25010000002 FUROSEMIDE PER 20 MG: Performed by: INTERNAL MEDICINE

## 2017-06-04 PROCEDURE — 99291 CRITICAL CARE FIRST HOUR: CPT | Performed by: INTERNAL MEDICINE

## 2017-06-04 PROCEDURE — 71010 HC CHEST AP: CPT

## 2017-06-04 PROCEDURE — 80202 ASSAY OF VANCOMYCIN: CPT | Performed by: INTERNAL MEDICINE

## 2017-06-04 PROCEDURE — 83735 ASSAY OF MAGNESIUM: CPT | Performed by: INTERNAL MEDICINE

## 2017-06-04 PROCEDURE — 36600 WITHDRAWAL OF ARTERIAL BLOOD: CPT | Performed by: INTERNAL MEDICINE

## 2017-06-04 RX ORDER — FUROSEMIDE 10 MG/ML
40 INJECTION INTRAMUSCULAR; INTRAVENOUS ONCE
Status: COMPLETED | OUTPATIENT
Start: 2017-06-04 | End: 2017-06-04

## 2017-06-04 RX ORDER — METOCLOPRAMIDE HYDROCHLORIDE 5 MG/ML
5 INJECTION INTRAMUSCULAR; INTRAVENOUS 3 TIMES DAILY
Status: DISCONTINUED | OUTPATIENT
Start: 2017-06-04 | End: 2017-06-08 | Stop reason: HOSPADM

## 2017-06-04 RX ORDER — METHYLPREDNISOLONE SODIUM SUCCINATE 40 MG/ML
20 INJECTION, POWDER, LYOPHILIZED, FOR SOLUTION INTRAMUSCULAR; INTRAVENOUS EVERY 12 HOURS SCHEDULED
Status: DISCONTINUED | OUTPATIENT
Start: 2017-06-04 | End: 2017-06-05

## 2017-06-04 RX ADMIN — FUROSEMIDE 40 MG: 10 INJECTION, SOLUTION INTRAMUSCULAR; INTRAVENOUS at 09:07

## 2017-06-04 RX ADMIN — IPRATROPIUM BROMIDE AND ALBUTEROL SULFATE 3 ML: .5; 3 SOLUTION RESPIRATORY (INHALATION) at 07:21

## 2017-06-04 RX ADMIN — ENOXAPARIN SODIUM 40 MG: 40 INJECTION SUBCUTANEOUS at 21:48

## 2017-06-04 RX ADMIN — METRONIDAZOLE 500 MG: 500 INJECTION, SOLUTION INTRAVENOUS at 09:07

## 2017-06-04 RX ADMIN — VANCOMYCIN HYDROCHLORIDE 1250 MG: 5 INJECTION, POWDER, LYOPHILIZED, FOR SOLUTION INTRAVENOUS at 18:35

## 2017-06-04 RX ADMIN — METHYLPREDNISOLONE SODIUM SUCCINATE 20 MG: 40 INJECTION, POWDER, FOR SOLUTION INTRAMUSCULAR; INTRAVENOUS at 21:48

## 2017-06-04 RX ADMIN — Medication 10 ML: at 09:08

## 2017-06-04 RX ADMIN — PROPOFOL 45 MCG/KG/MIN: 10 INJECTION, EMULSION INTRAVENOUS at 15:23

## 2017-06-04 RX ADMIN — ATORVASTATIN CALCIUM 20 MG: 20 TABLET, FILM COATED ORAL at 21:49

## 2017-06-04 RX ADMIN — METHYLPREDNISOLONE SODIUM SUCCINATE 20 MG: 40 INJECTION, POWDER, FOR SOLUTION INTRAMUSCULAR; INTRAVENOUS at 11:24

## 2017-06-04 RX ADMIN — PROPOFOL 45 MCG/KG/MIN: 10 INJECTION, EMULSION INTRAVENOUS at 21:56

## 2017-06-04 RX ADMIN — PROPOFOL 45 MCG/KG/MIN: 10 INJECTION, EMULSION INTRAVENOUS at 11:21

## 2017-06-04 RX ADMIN — IPRATROPIUM BROMIDE AND ALBUTEROL SULFATE 3 ML: .5; 3 SOLUTION RESPIRATORY (INHALATION) at 19:05

## 2017-06-04 RX ADMIN — METOCLOPRAMIDE 5 MG: 5 INJECTION, SOLUTION INTRAMUSCULAR; INTRAVENOUS at 21:47

## 2017-06-04 RX ADMIN — CASTOR OIL AND BALSAM, PERU: 788; 87 OINTMENT TOPICAL at 09:07

## 2017-06-04 RX ADMIN — IPRATROPIUM BROMIDE AND ALBUTEROL SULFATE 3 ML: .5; 3 SOLUTION RESPIRATORY (INHALATION) at 00:19

## 2017-06-04 RX ADMIN — Medication 10 ML: at 21:50

## 2017-06-04 RX ADMIN — CEFEPIME 2 G: 2 INJECTION, POWDER, FOR SOLUTION INTRAVENOUS at 15:24

## 2017-06-04 RX ADMIN — PROPOFOL 45 MCG/KG/MIN: 10 INJECTION, EMULSION INTRAVENOUS at 18:35

## 2017-06-04 RX ADMIN — CEFEPIME 2 G: 2 INJECTION, POWDER, FOR SOLUTION INTRAVENOUS at 09:06

## 2017-06-04 RX ADMIN — PROPOFOL 45 MCG/KG/MIN: 10 INJECTION, EMULSION INTRAVENOUS at 08:01

## 2017-06-04 RX ADMIN — IPRATROPIUM BROMIDE AND ALBUTEROL SULFATE 3 ML: .5; 3 SOLUTION RESPIRATORY (INHALATION) at 12:14

## 2017-06-04 RX ADMIN — METOCLOPRAMIDE 5 MG: 5 INJECTION, SOLUTION INTRAMUSCULAR; INTRAVENOUS at 11:23

## 2017-06-04 RX ADMIN — PROPOFOL 45 MCG/KG/MIN: 10 INJECTION, EMULSION INTRAVENOUS at 05:01

## 2017-06-04 RX ADMIN — METRONIDAZOLE 500 MG: 500 INJECTION, SOLUTION INTRAVENOUS at 15:24

## 2017-06-04 RX ADMIN — PROPOFOL 45 MCG/KG/MIN: 10 INJECTION, EMULSION INTRAVENOUS at 00:21

## 2017-06-04 RX ADMIN — ASPIRIN 325 MG: 325 TABLET ORAL at 09:06

## 2017-06-04 RX ADMIN — METOCLOPRAMIDE 5 MG: 5 INJECTION, SOLUTION INTRAMUSCULAR; INTRAVENOUS at 15:24

## 2017-06-04 RX ADMIN — VANCOMYCIN HYDROCHLORIDE 1250 MG: 5 INJECTION, POWDER, LYOPHILIZED, FOR SOLUTION INTRAVENOUS at 06:03

## 2017-06-04 RX ADMIN — Medication 10 ML: at 21:49

## 2017-06-04 RX ADMIN — CASTOR OIL AND BALSAM, PERU: 788; 87 OINTMENT TOPICAL at 21:48

## 2017-06-04 RX ADMIN — PANTOPRAZOLE SODIUM 40 MG: 40 INJECTION, POWDER, FOR SOLUTION INTRAVENOUS at 06:03

## 2017-06-04 NOTE — PLAN OF CARE
Problem: Mechanical Ventilation, Invasive (Adult)  Goal: Signs and Symptoms of Listed Potential Problems Will be Absent or Manageable (Mechanical Ventilation, Invasive)  Outcome: Ongoing (interventions implemented as appropriate)    Problem: Sepsis (Adult)  Goal: Signs and Symptoms of Listed Potential Problems Will be Absent or Manageable (Sepsis)  Outcome: Ongoing (interventions implemented as appropriate)    Problem: Skin Integrity Impairment, Risk/Actual (Adult)  Goal: Identify Related Risk Factors and Signs and Symptoms  Outcome: Ongoing (interventions implemented as appropriate)  Goal: Skin Integrity/Wound Healing  Outcome: Ongoing (interventions implemented as appropriate)    Problem: Pneumonia (Adult)  Goal: Signs and Symptoms of Listed Potential Problems Will be Absent or Manageable (Pneumonia)  Outcome: Ongoing (interventions implemented as appropriate)    Problem: Patient Care Overview (Adult)  Goal: Plan of Care Review  Outcome: Ongoing (interventions implemented as appropriate)  Goal: Adult Individualization and Mutuality  Outcome: Ongoing (interventions implemented as appropriate)    Problem: Fall Risk (Adult)  Goal: Identify Related Risk Factors and Signs and Symptoms  Outcome: Ongoing (interventions implemented as appropriate)  Goal: Absence of Falls  Outcome: Ongoing (interventions implemented as appropriate)    Problem: Pressure Ulcer (Adult)  Goal: Signs and Symptoms of Listed Potential Problems Will be Absent or Manageable (Pressure Ulcer)  Outcome: Ongoing (interventions implemented as appropriate)

## 2017-06-04 NOTE — PROGRESS NOTES
Subjective     History:   Mayra Hays is a 71 y.o. female admitted on 5/30/2017 secondary to Sepsis due to pneumonia     Procedures:   Intubation in the ED  6/2/17: Bronchoscopy revealing mucopurulent secretions in the RLL and LLL    Patient seen and examined. Sedated on vent support. A few episodes of hypotension but overall much improved. Diuresed well with Lasix again yesterday. (+) BM's with bowel stimulation as well. TF's restarted but residuals have been noted. RN and RT both report increased secretions. No acute events overnight per RN.     History taken from: chart, and RN.      Objective     Vital Signs  Temp:  [98.2 °F (36.8 °C)-98.6 °F (37 °C)] 98.2 °F (36.8 °C)  Heart Rate:  [51-81] 70  Resp:  [20-26] 20  BP: ()/(46-75) 117/67  FiO2 (%):  [40 %] 40 %    Intake/Output Summary (Last 24 hours) at 06/04/17 0827  Last data filed at 06/04/17 0603   Gross per 24 hour   Intake          1806.82 ml   Output             3075 ml   Net         -1268.18 ml         Physical Exam:  General:    Sedated on vent support    Heart:      Normal S1 and S2. Regular rate and rhythm. No significant murmur, rubs or gallops appreciated.   Lungs:     Respirations regular, even and unlabored. Coarse breath sounds throughout. No wheezes.    Abdomen:   Soft and nontender. No guarding, rebound tenderness or  organomegaly noted. Bowel sounds present x 4.   Extremities:  1+ LE edema B/L.      Results Review:      Results from last 7 days  Lab Units 06/04/17  0355 06/03/17  0058 06/02/17  0315 06/01/17  0527 05/31/17  0115 05/30/17  1649   WBC 10*3/mm3 7.33 8.22 11.48 12.76* 13.99* 18.80*   HEMOGLOBIN g/dL 10.4* 9.9* 10.7* 11.4* 12.5 14.4   PLATELETS 10*3/mm3 227 218 194 203 204 298       Results from last 7 days  Lab Units 06/04/17  0355 06/03/17  0921 06/03/17  0058 06/02/17  0315 06/01/17  0527 05/31/17  0115 05/30/17  1649   SODIUM mmol/L 145  --  145 143 142 141 138   POTASSIUM mmol/L 4.0 4.4 3.6 4.0 3.9 3.6 5.5*   CHLORIDE  mmol/L 117*  --  116* 118* 115* 108 104   TOTAL CO2 mmol/L 25.0  --  25.9 23.6* 25.5 24.7 31.4   BUN mg/dL 23*  --  23* 16 19 20 13   CREATININE mg/dL 0.56  --  0.64 0.62 0.68 1.17 1.02   CALCIUM mg/dL 9.1  --  8.6 8.6 8.6 8.9 9.2   GLUCOSE mg/dL 94  --  100 111* 205* 201* 308*       Results from last 7 days  Lab Units 06/04/17  0355 06/03/17  0058 06/02/17  0315 06/01/17  0527 05/31/17  0115 05/30/17  1649   BILIRUBIN mg/dL 0.2 0.2 0.2 0.2 0.4 0.5   ALK PHOS U/L 58 56 60 67 84 106*   AST (SGOT) U/L 22 13 14 17 44* 54*   ALT (SGPT) U/L 14 12 14 21 33 34       Results from last 7 days  Lab Units 06/04/17  0355 06/03/17  0058 06/02/17  0315 06/01/17  0527 05/31/17  0446   MAGNESIUM mg/dL 2.4 2.3 2.4 2.6 1.6*       Results from last 7 days  Lab Units 06/01/17 0527 05/31/17  0446 05/31/17  0115 05/30/17  1649   INR  1.02 1.03 0.98 0.93       Results from last 7 days  Lab Units 06/03/17  0058 06/02/17  1945 06/02/17  1448 06/01/17  0527 05/31/17  1101 05/31/17  0446 05/30/17  2331   CK TOTAL U/L  --   --  22* 33  --  45 51   TROPONIN I ng/mL 0.117* 0.126* 0.145* 0.220* 0.450* 0.743* 1.117*   CK MB INDEX %  --   --  1.4 5.9*  --   --  5.6*   MYOGLOBIN ng/mL  --   --   --  47.0 91.0 75.0 98.0       Imaging Results (last 24 hours)     Procedure Component Value Units Date/Time    XR Chest AP [[id number]] Collected:  06/03/17 0846     Updated:  06/03/17 0849    Narrative:       EXAMINATION: XR CHEST AP-      CLINICAL INDICATION:     Respiratory failure; J18.9-Pneumonia,  unspecified organism; J96.01-Acute respiratory failure with hypoxia;  J96.02-Acute respiratory failure with hypercapnia; J18.9-Pneumonia,  unspecified organism     TECHNIQUE: Single AP view of chest.      COMPARISON: 06/01/2017      FINDINGS:   There is bibasilar atelectasis.   Lungs are otherwise aerated. Right PICC line now with tip in SVC.  Support tube and lines are in unchanged position.   Heart size is stable.  No pneumothorax.   Tiny bilateral pleural  effusions persist.        Impression:       Right PICC line now with tip in SVC.     This report was finalized on 6/3/2017 8:47 AM by Dr. Bill Parham MD.       XR Chest AP [[id number]] Updated:  06/04/17 0713            Medications:    aspirin 325 mg Oral Daily   atorvastatin 20 mg Oral Nightly   castor oil-balsam peru  Topical Q12H   cefepime 2 g Intravenous Q8H   enoxaparin 40 mg Subcutaneous Nightly   furosemide 40 mg Intravenous Once   ipratropium-albuterol 3 mL Nebulization 4x Daily - RT   metoclopramide 5 mg Intravenous TID   metroNIDAZOLE 500 mg Intravenous Q8H   pantoprazole 40 mg Intravenous Q AM   Pharmacy Meds to Bed Consult  Does not apply Daily   polyethylene glycol 17 g Oral Daily   sodium chloride 10 mL Intracatheter Q12H   sodium chloride 10 mL Intracatheter Q12H   vancomycin 1,250 mg Intravenous Q12H       DOPamine 5 mcg/kg/min Last Rate: Stopped (06/01/17 1840)   propofol 5-50 mcg/kg/min Last Rate: 45 mcg/kg/min (06/04/17 0801)           Assessment/Plan   Severe sepsis: Likely 2/2 bilateral pneumonia (R>L). 1/2 blood cultures revealing GPC but possibly a contaminant as other cultures remain negative. Hypotension overall improved. WBC and CRP improved today. Cont broad spectrum IV antibiotics per ID. Cont to follow culture and repeat labs in the AM. Repeat CXR in the AM. ID and pulm input appreciated.     Acute hypoxic and hypercapnic respiratory failure: Likely 2/2 sepsis, pneumonia and COPD exacerbation. Cont broad spectrum IV antibiotics, IV steroids, nebs and vent support. Vent adjustment made this AM as she is more acidotic on ABG. Will order another dose of Lasix today. Repeat labs, ABG and CXR in the AM.  Pulm following with input appreciated.     NSTEMI: Likely type II in setting of respiratory failure. Echo obtained revealing an EF of 55-60%, grade II diastolic dysfunction, trace-to-mild MR, mild TR and mild pulmonary HTN. Cont management per cardiology. Currently on ASA and statin. No  beta blocerk 2/2 bradycardia and hypotension. Plan for additional ischemic workup once her respiratory status improves per cardiology. Cont to monitor on telemetry. Cardiology input appreciated.     Mild hyperkalemia: Resolved. Stable today. Repeat labs in the AM.     Sinus bradycardia: Metoprolol D/C'd and HR has improved. Cont to monitor on telemetry.     CKD III with prerenal azotemia: Cont to monitor closely in the setting of diuresis. Repeat labs in the AM.     Constipation: Improved today. Cont Miralax.  Add Reglan as she is not tolerating TF's.     PPX  GI: PPI  DVT: SQ Lovenox      Pt is at high risk 2/2 severe sepsis, respiratory failure, pneumonia, COPD exacerbation, NSTEMI and hx of heavy tobacco abuse.       Critical Care Time: 33 minutes         Madhu Hoffmann DO  06/04/17  8:27 AM

## 2017-06-04 NOTE — PLAN OF CARE
Problem: Mechanical Ventilation, Invasive (Adult)  Goal: Signs and Symptoms of Listed Potential Problems Will be Absent or Manageable (Mechanical Ventilation, Invasive)  Outcome: Ongoing (interventions implemented as appropriate)    06/04/17 1006   Mechanical Ventilation, Invasive   Problems Assessed (Mechanical Ventilation, Invasive) all   Problems Present (Mechanical Ventilation, Invasive) situational response;inability to wean         Problem: Sepsis (Adult)  Goal: Signs and Symptoms of Listed Potential Problems Will be Absent or Manageable (Sepsis)  Outcome: Ongoing (interventions implemented as appropriate)    06/04/17 1006   Sepsis   Problems Assessed (Sepsis) all   Problems Present (Sepsis) none         Problem: Skin Integrity Impairment, Risk/Actual (Adult)  Goal: Identify Related Risk Factors and Signs and Symptoms  Outcome: Ongoing (interventions implemented as appropriate)    06/04/17 1006   Skin Integrity Impairment, Risk/Actual   Skin Integrity Impairment, Risk/Actual: Related Risk Factors age extremes;edema;immobility       Goal: Skin Integrity/Wound Healing  Outcome: Ongoing (interventions implemented as appropriate)    06/04/17 1006   Skin Integrity Impairment, Risk/Actual (Adult)   Skin Integrity/Wound Healing making progress toward outcome         Problem: Pneumonia (Adult)  Goal: Signs and Symptoms of Listed Potential Problems Will be Absent or Manageable (Pneumonia)  Outcome: Ongoing (interventions implemented as appropriate)    06/04/17 1006   Pneumonia   Problems Assessed (Pneumonia) all   Problems Present (Pneumonia) respiratory compromise         Problem: Patient Care Overview (Adult)  Goal: Plan of Care Review  Outcome: Ongoing (interventions implemented as appropriate)    06/04/17 1006   Coping/Psychosocial Response Interventions   Plan Of Care Reviewed With patient   Patient Care Overview   Progress progress toward functional goals is gradual       Goal: Discharge Needs  Assessment  Outcome: Ongoing (interventions implemented as appropriate)    06/04/17 1006   Discharge Needs Assessment   Discharge Disposition still a patient         Problem: Fall Risk (Adult)  Goal: Identify Related Risk Factors and Signs and Symptoms  Outcome: Ongoing (interventions implemented as appropriate)    06/04/17 1006   Fall Risk   Fall Risk: Related Risk Factors age-related changes   Fall Risk: Signs and Symptoms presence of risk factors       Goal: Absence of Falls  Outcome: Ongoing (interventions implemented as appropriate)    06/04/17 1006   Fall Risk (Adult)   Absence of Falls making progress toward outcome         Problem: Pressure Ulcer (Adult)  Goal: Signs and Symptoms of Listed Potential Problems Will be Absent or Manageable (Pressure Ulcer)  Outcome: Ongoing (interventions implemented as appropriate)    06/04/17 1006   Pressure Ulcer   Problems Assessed (Pressure Ulcer) all   Problems Present (Pressure Ulcer) none

## 2017-06-04 NOTE — PROGRESS NOTES
Vancomycin trough is therapeutic today at 16.8 mg/L. Will continue current dose at this time. Pharmacy will continue to follow.    Leni Luo [facility]  06/04/17  8:04 AM

## 2017-06-05 ENCOUNTER — APPOINTMENT (OUTPATIENT)
Dept: GENERAL RADIOLOGY | Facility: HOSPITAL | Age: 72
End: 2017-06-05

## 2017-06-05 LAB
A-A DO2: 146.7 MMHG (ref 0–300)
A-A DO2: 96.7 MMHG (ref 0–300)
ALBUMIN SERPL-MCNC: 3.4 G/DL (ref 3.4–4.8)
ALBUMIN/GLOB SERPL: 0.9 G/DL (ref 1.5–2.5)
ALP SERPL-CCNC: 63 U/L (ref 35–104)
ALT SERPL W P-5'-P-CCNC: 18 U/L (ref 10–36)
ANION GAP SERPL CALCULATED.3IONS-SCNC: 4.5 MMOL/L (ref 3.6–11.2)
ARTERIAL PATENCY WRIST A: POSITIVE
AST SERPL-CCNC: 24 U/L (ref 10–30)
ATMOSPHERIC PRESS: 723 MMHG
ATMOSPHERIC PRESS: 734 MMHG
BACTERIA SPEC AEROBE CULT: ABNORMAL
BACTERIA SPEC AEROBE CULT: NORMAL
BACTERIA SPEC AEROBE CULT: NORMAL
BACTERIA SPEC RESP CULT: NO GROWTH
BACTERIA SPEC RESP CULT: NO GROWTH
BASE EXCESS BLDA CALC-SCNC: -3 MMOL/L
BASE EXCESS BLDV CALC-SCNC: -3.5 MMOL/L
BASOPHILS # BLD AUTO: 0.04 10*3/MM3 (ref 0–0.3)
BASOPHILS NFR BLD AUTO: 0.5 % (ref 0–2)
BDY SITE: NORMAL
BDY SITE: NORMAL
BILIRUB SERPL-MCNC: 0.2 MG/DL (ref 0.2–1.8)
BODY TEMPERATURE: 98.6 C
BODY TEMPERATURE: 98.6 C
BUN BLD-MCNC: 24 MG/DL (ref 7–21)
BUN/CREAT SERPL: 41.4 (ref 7–25)
CALCIUM SPEC-SCNC: 9.5 MG/DL (ref 7.7–10)
CHLORIDE SERPL-SCNC: 113 MMOL/L (ref 99–112)
CO2 SERPL-SCNC: 24.5 MMOL/L (ref 24.3–31.9)
COHGB MFR BLD: 0.9 % (ref 0–5)
CREAT BLD-MCNC: 0.58 MG/DL (ref 0.43–1.29)
CRP SERPL-MCNC: 5.39 MG/DL (ref 0–0.99)
DEPRECATED RDW RBC AUTO: 43.4 FL (ref 37–54)
EOSINOPHIL # BLD AUTO: 0.03 10*3/MM3 (ref 0–0.7)
EOSINOPHIL NFR BLD AUTO: 0.3 % (ref 0–7)
ERYTHROCYTE [DISTWIDTH] IN BLOOD BY AUTOMATED COUNT: 13.7 % (ref 11.5–14.5)
GFR SERPL CREATININE-BSD FRML MDRD: 103 ML/MIN/1.73
GLOBULIN UR ELPH-MCNC: 3.7 GM/DL
GLUCOSE BLD-MCNC: 175 MG/DL (ref 70–110)
GLUCOSE BLDC GLUCOMTR-MCNC: 157 MG/DL (ref 70–130)
GLUCOSE BLDC GLUCOMTR-MCNC: 163 MG/DL (ref 70–130)
GRAM STN SPEC: ABNORMAL
GRAM STN SPEC: NORMAL
GRAM STN SPEC: NORMAL
HCO3 BLDA-SCNC: 22.2 MMOL/L (ref 22–26)
HCO3 BLDV-SCNC: 22.9 MMOL/L
HCT VFR BLD AUTO: 37.9 % (ref 37–47)
HCT VFR BLD CALC: 39 % (ref 37–47)
HGB BLD-MCNC: 11.7 G/DL (ref 12–16)
HGB BLDA-MCNC: 12.8 G/DL (ref 12–16)
HGB BLDA-MCNC: 13.2 G/DL (ref 12–16)
HOROWITZ INDEX BLD+IHG-RTO: 30 %
HOROWITZ INDEX BLD+IHG-RTO: 40 %
HYPOCHROMIA BLD QL: ABNORMAL
IMM GRANULOCYTES # BLD: 0.53 10*3/MM3 (ref 0–0.03)
IMM GRANULOCYTES NFR BLD: 6.1 % (ref 0–0.5)
ISOLATED FROM: ABNORMAL
LYMPHOCYTES # BLD AUTO: 0.82 10*3/MM3 (ref 1–3)
LYMPHOCYTES # BLD MANUAL: 0.52 10*3/MM3 (ref 1–3)
LYMPHOCYTES NFR BLD AUTO: 9.5 % (ref 16–46)
LYMPHOCYTES NFR BLD MANUAL: 4 % (ref 0–12)
LYMPHOCYTES NFR BLD MANUAL: 6 % (ref 16–46)
MAGNESIUM SERPL-MCNC: 2.4 MG/DL (ref 1.7–2.6)
MCH RBC QN AUTO: 26.7 PG (ref 27–33)
MCHC RBC AUTO-ENTMCNC: 30.9 G/DL (ref 33–37)
MCV RBC AUTO: 86.5 FL (ref 80–94)
METHGB BLD QL: 0.2 % (ref 0–3)
MODALITY: NORMAL
MODALITY: NORMAL
MONOCYTES # BLD AUTO: 0.3 10*3/MM3 (ref 0.1–0.9)
MONOCYTES # BLD AUTO: 0.35 10*3/MM3 (ref 0.1–0.9)
MONOCYTES NFR BLD AUTO: 3.5 % (ref 0–12)
NEUTROPHILS # BLD AUTO: 6.95 10*3/MM3 (ref 1.4–6.5)
NEUTROPHILS # BLD AUTO: 7.8 10*3/MM3 (ref 1.4–6.5)
NEUTROPHILS NFR BLD AUTO: 80.1 % (ref 40–75)
NEUTROPHILS NFR BLD MANUAL: 88 % (ref 40–75)
NEUTS BAND NFR BLD MANUAL: 2 % (ref 0–8)
OSMOLALITY SERPL CALC.SUM OF ELEC: 291.4 MOSM/KG (ref 273–305)
OXYHGB MFR BLDV: 94.2 % (ref 85–100)
PCO2 BLDA: 40.4 MM HG (ref 35–45)
PCO2 BLDV: 46.7 MM HG
PEEP RESPIRATORY: 5 CM[H2O]
PEEP RESPIRATORY: 5 CM[H2O]
PH BLDA: 7.36 PH UNITS (ref 7.35–7.45)
PH BLDV: 7.31 [PH]
PHOSPHATE SERPL-MCNC: 3.4 MG/DL (ref 2.7–4.5)
PLAT MORPH BLD: NORMAL
PLATELET # BLD AUTO: 272 10*3/MM3 (ref 130–400)
PMV BLD AUTO: 12 FL (ref 6–10)
PO2 BLDA: 81.6 MM HG (ref 80–100)
PO2 BLDV: 51.2 MM HG
POTASSIUM BLD-SCNC: 4.9 MMOL/L (ref 3.5–5.3)
PROT SERPL-MCNC: 7.1 G/DL (ref 6–8)
PSV: 8 CMH2O
RBC # BLD AUTO: 4.38 10*6/MM3 (ref 4.2–5.4)
SAO2 % BLDCOA: 95.2 % (ref 90–100)
SAO2 % BLDCOV: 81.2 %
SET MECH RESP RATE: 21
SODIUM BLD-SCNC: 142 MMOL/L (ref 135–153)
VENTILATOR MODE: AC
VENTILATOR MODE: NORMAL
VT ON VENT VENT: 400 ML
WBC NRBC COR # BLD: 8.67 10*3/MM3 (ref 4.5–12.5)

## 2017-06-05 PROCEDURE — 63710000001 PREDNISONE PER 1 MG: Performed by: INTERNAL MEDICINE

## 2017-06-05 PROCEDURE — 86140 C-REACTIVE PROTEIN: CPT | Performed by: INTERNAL MEDICINE

## 2017-06-05 PROCEDURE — 94002 VENT MGMT INPAT INIT DAY: CPT

## 2017-06-05 PROCEDURE — 99291 CRITICAL CARE FIRST HOUR: CPT | Performed by: INTERNAL MEDICINE

## 2017-06-05 PROCEDURE — 83735 ASSAY OF MAGNESIUM: CPT | Performed by: INTERNAL MEDICINE

## 2017-06-05 PROCEDURE — 94799 UNLISTED PULMONARY SVC/PX: CPT

## 2017-06-05 PROCEDURE — 71010 HC CHEST AP: CPT

## 2017-06-05 PROCEDURE — 25010000002 PROPOFOL 1000 MG/ML EMULSION: Performed by: INTERNAL MEDICINE

## 2017-06-05 PROCEDURE — 36600 WITHDRAWAL OF ARTERIAL BLOOD: CPT | Performed by: INTERNAL MEDICINE

## 2017-06-05 PROCEDURE — 25010000002 CEFEPIME: Performed by: INTERNAL MEDICINE

## 2017-06-05 PROCEDURE — 82962 GLUCOSE BLOOD TEST: CPT

## 2017-06-05 PROCEDURE — 84100 ASSAY OF PHOSPHORUS: CPT | Performed by: INTERNAL MEDICINE

## 2017-06-05 PROCEDURE — 82805 BLOOD GASES W/O2 SATURATION: CPT | Performed by: INTERNAL MEDICINE

## 2017-06-05 PROCEDURE — 25010000002 VANCOMYCIN PER 500 MG: Performed by: INTERNAL MEDICINE

## 2017-06-05 PROCEDURE — 80053 COMPREHEN METABOLIC PANEL: CPT | Performed by: INTERNAL MEDICINE

## 2017-06-05 PROCEDURE — 82375 ASSAY CARBOXYHB QUANT: CPT | Performed by: INTERNAL MEDICINE

## 2017-06-05 PROCEDURE — 93005 ELECTROCARDIOGRAM TRACING: CPT | Performed by: INTERNAL MEDICINE

## 2017-06-05 PROCEDURE — 83050 HGB METHEMOGLOBIN QUAN: CPT | Performed by: INTERNAL MEDICINE

## 2017-06-05 PROCEDURE — 85007 BL SMEAR W/DIFF WBC COUNT: CPT | Performed by: INTERNAL MEDICINE

## 2017-06-05 PROCEDURE — 71010 HC CHEST PA OR AP: CPT

## 2017-06-05 PROCEDURE — 25010000002 ENOXAPARIN PER 10 MG: Performed by: INTERNAL MEDICINE

## 2017-06-05 PROCEDURE — 85025 COMPLETE CBC W/AUTO DIFF WBC: CPT | Performed by: INTERNAL MEDICINE

## 2017-06-05 PROCEDURE — 71010 XR CHEST 1 VW: CPT | Performed by: RADIOLOGY

## 2017-06-05 PROCEDURE — 25010000002 METHYLPREDNISOLONE PER 40 MG: Performed by: INTERNAL MEDICINE

## 2017-06-05 PROCEDURE — 25010000002 METOCLOPRAMIDE PER 10 MG: Performed by: INTERNAL MEDICINE

## 2017-06-05 PROCEDURE — 71010 XR CHEST AP: CPT | Performed by: RADIOLOGY

## 2017-06-05 PROCEDURE — 93010 ELECTROCARDIOGRAM REPORT: CPT | Performed by: INTERNAL MEDICINE

## 2017-06-05 RX ORDER — PREDNISONE 20 MG/1
20 TABLET ORAL
Status: DISCONTINUED | OUTPATIENT
Start: 2017-06-05 | End: 2017-06-07

## 2017-06-05 RX ORDER — DOPAMINE HYDROCHLORIDE 160 MG/100ML
2-20 INJECTION, SOLUTION INTRAVENOUS
Status: DISCONTINUED | OUTPATIENT
Start: 2017-06-06 | End: 2017-06-07

## 2017-06-05 RX ADMIN — PROPOFOL 45 MCG/KG/MIN: 10 INJECTION, EMULSION INTRAVENOUS at 07:21

## 2017-06-05 RX ADMIN — VANCOMYCIN HYDROCHLORIDE 1250 MG: 5 INJECTION, POWDER, LYOPHILIZED, FOR SOLUTION INTRAVENOUS at 06:00

## 2017-06-05 RX ADMIN — METRONIDAZOLE 500 MG: 500 INJECTION, SOLUTION INTRAVENOUS at 15:46

## 2017-06-05 RX ADMIN — CEFEPIME 2 G: 2 INJECTION, POWDER, FOR SOLUTION INTRAVENOUS at 08:12

## 2017-06-05 RX ADMIN — CEFEPIME 2 G: 2 INJECTION, POWDER, FOR SOLUTION INTRAVENOUS at 15:45

## 2017-06-05 RX ADMIN — IPRATROPIUM BROMIDE AND ALBUTEROL SULFATE 3 ML: .5; 3 SOLUTION RESPIRATORY (INHALATION) at 19:28

## 2017-06-05 RX ADMIN — PROPOFOL 45 MCG/KG/MIN: 10 INJECTION, EMULSION INTRAVENOUS at 21:59

## 2017-06-05 RX ADMIN — METRONIDAZOLE 500 MG: 500 INJECTION, SOLUTION INTRAVENOUS at 23:44

## 2017-06-05 RX ADMIN — CEFEPIME 2 G: 2 INJECTION, POWDER, FOR SOLUTION INTRAVENOUS at 01:19

## 2017-06-05 RX ADMIN — PROPOFOL 50 MCG/KG/MIN: 10 INJECTION, EMULSION INTRAVENOUS at 00:36

## 2017-06-05 RX ADMIN — IPRATROPIUM BROMIDE AND ALBUTEROL SULFATE 3 ML: .5; 3 SOLUTION RESPIRATORY (INHALATION) at 07:08

## 2017-06-05 RX ADMIN — PREDNISONE 20 MG: 20 TABLET ORAL at 11:44

## 2017-06-05 RX ADMIN — CASTOR OIL AND BALSAM, PERU: 788; 87 OINTMENT TOPICAL at 08:12

## 2017-06-05 RX ADMIN — METRONIDAZOLE 500 MG: 500 INJECTION, SOLUTION INTRAVENOUS at 08:22

## 2017-06-05 RX ADMIN — ENOXAPARIN SODIUM 40 MG: 40 INJECTION SUBCUTANEOUS at 21:07

## 2017-06-05 RX ADMIN — Medication 10 ML: at 08:32

## 2017-06-05 RX ADMIN — PROPOFOL 45 MCG/KG/MIN: 10 INJECTION, EMULSION INTRAVENOUS at 03:55

## 2017-06-05 RX ADMIN — Medication 10 ML: at 21:08

## 2017-06-05 RX ADMIN — VANCOMYCIN HYDROCHLORIDE 1250 MG: 5 INJECTION, POWDER, LYOPHILIZED, FOR SOLUTION INTRAVENOUS at 16:49

## 2017-06-05 RX ADMIN — CASTOR OIL AND BALSAM, PERU: 788; 87 OINTMENT TOPICAL at 21:07

## 2017-06-05 RX ADMIN — METOCLOPRAMIDE 5 MG: 5 INJECTION, SOLUTION INTRAMUSCULAR; INTRAVENOUS at 15:45

## 2017-06-05 RX ADMIN — ASPIRIN 325 MG: 325 TABLET ORAL at 09:19

## 2017-06-05 RX ADMIN — METOCLOPRAMIDE 5 MG: 5 INJECTION, SOLUTION INTRAMUSCULAR; INTRAVENOUS at 21:06

## 2017-06-05 RX ADMIN — PANTOPRAZOLE SODIUM 40 MG: 40 INJECTION, POWDER, FOR SOLUTION INTRAVENOUS at 06:05

## 2017-06-05 RX ADMIN — Medication 10 ML: at 21:07

## 2017-06-05 RX ADMIN — METRONIDAZOLE 500 MG: 500 INJECTION, SOLUTION INTRAVENOUS at 00:35

## 2017-06-05 RX ADMIN — PROPOFOL 15 MCG/KG/MIN: 10 INJECTION, EMULSION INTRAVENOUS at 13:50

## 2017-06-05 RX ADMIN — METOCLOPRAMIDE 5 MG: 5 INJECTION, SOLUTION INTRAMUSCULAR; INTRAVENOUS at 08:12

## 2017-06-05 RX ADMIN — IPRATROPIUM BROMIDE AND ALBUTEROL SULFATE 3 ML: .5; 3 SOLUTION RESPIRATORY (INHALATION) at 12:51

## 2017-06-05 RX ADMIN — ATORVASTATIN CALCIUM 20 MG: 20 TABLET, FILM COATED ORAL at 21:07

## 2017-06-05 RX ADMIN — METHYLPREDNISOLONE SODIUM SUCCINATE 20 MG: 40 INJECTION, POWDER, FOR SOLUTION INTRAMUSCULAR; INTRAVENOUS at 08:11

## 2017-06-05 RX ADMIN — IPRATROPIUM BROMIDE AND ALBUTEROL SULFATE 3 ML: .5; 3 SOLUTION RESPIRATORY (INHALATION) at 00:17

## 2017-06-05 RX ADMIN — CEFEPIME 2 G: 2 INJECTION, POWDER, FOR SOLUTION INTRAVENOUS at 23:39

## 2017-06-05 RX ADMIN — Medication 10 ML: at 08:13

## 2017-06-05 NOTE — PLAN OF CARE
Problem: Mechanical Ventilation, Invasive (Adult)  Goal: Signs and Symptoms of Listed Potential Problems Will be Absent or Manageable (Mechanical Ventilation, Invasive)  Outcome: Ongoing (interventions implemented as appropriate)    Problem: Sepsis (Adult)  Goal: Signs and Symptoms of Listed Potential Problems Will be Absent or Manageable (Sepsis)  Outcome: Ongoing (interventions implemented as appropriate)    Problem: Skin Integrity Impairment, Risk/Actual (Adult)  Goal: Identify Related Risk Factors and Signs and Symptoms  Outcome: Ongoing (interventions implemented as appropriate)  Goal: Skin Integrity/Wound Healing  Outcome: Ongoing (interventions implemented as appropriate)    Problem: Pneumonia (Adult)  Goal: Signs and Symptoms of Listed Potential Problems Will be Absent or Manageable (Pneumonia)  Outcome: Ongoing (interventions implemented as appropriate)    Problem: Patient Care Overview (Adult)  Goal: Plan of Care Review  Outcome: Ongoing (interventions implemented as appropriate)  Goal: Adult Individualization and Mutuality  Outcome: Ongoing (interventions implemented as appropriate)  Goal: Discharge Needs Assessment  Outcome: Ongoing (interventions implemented as appropriate)    Problem: Fall Risk (Adult)  Goal: Identify Related Risk Factors and Signs and Symptoms  Outcome: Ongoing (interventions implemented as appropriate)  Goal: Absence of Falls  Outcome: Ongoing (interventions implemented as appropriate)    Problem: Pressure Ulcer (Adult)  Goal: Signs and Symptoms of Listed Potential Problems Will be Absent or Manageable (Pressure Ulcer)  Outcome: Ongoing (interventions implemented as appropriate)

## 2017-06-05 NOTE — PROGRESS NOTES
Discharge Planning Assessment   Pasquale     Patient Name: Mayar Hays  MRN: [id number]  Today's Date: 6/5/2017    Admit Date: 5/30/2017          Discharge Plan       06/05/17 1357    Case Management/Social Work Plan    Plan Pt is still on the vent.  Pt lives at home with her daughter, Hailey and plans to return home at discharge. Pt does not utilize home health.  Pt has home oxygen via [employer].  Pt will be transported home via private auto.  SS will continue to follow .     Patient/Family In Agreement With Plan yes        Expected Discharge Date and Time     Expected Discharge Date Expected Discharge Time    Jun 6, 2017           Leah Olivarez

## 2017-06-05 NOTE — PROGRESS NOTES
[facility]    Patient Identification:  Name:  Mayra Hays  Age:  71 y.o.  Sex:  female  :  1945  MRN:  [id number]  Visit Number:  [id number]  Primary Care Provider:  ERASTO Lee    Length of stay:  6  CCU length of stay:  6    CODE STATUS:  Full Code    Chief complaint: Shortness of air    Subjective:  72 yo admitted last night with shortness of air and respiratory failure from right middle lobe and right lower lobe pneumonia. She was intubated in the ED.  Shortly afterwards, she had elevated troponins and was diagnosed with a NSTEMI.  She remains intubated and cannot give me any history at this time.  No family is at bedside.  She was on dopamine briefly due to the need for sedation for the vent.  Per the nursing staff, she is doing well.  She is on low vent settings and no vasopressors at this time.    Procedures:  2017 intubated and mechanically ventilated  2017 bronchoscopy  2017 PICC right basilic vein  -------------------------------------------------------------------------------------------------------------------  Ventilator settings:  Vent Mode: VC/AC  FiO2 (%):  [40 %] 40 %  S RR:  [21] 21  PEEP/CPAP (cm H2O):  [5 cm H20] 5 cm H20  MAP (cm H2O):  [10-21] 11  -------------------------------------------------------------------------------------------------------------------  pH pH, Arterial   Date Value Ref Range Status   2017 7.358 7.350 - 7.450 pH units Final   2017 7.356 7.350 - 7.450 pH units Final   2017 7.298 (C) 7.350 - 7.450 pH units Final   2017 7.347 (L) 7.350 - 7.450 pH units Final   2017 7.324 (L) 7.350 - 7.450 pH units Final   2017 7.534 (C) 7.350 - 7.450 pH units Final      pO2 pO2, Arterial   Date Value Ref Range Status   2017 81.6 80.0 - 100.0 mm Hg Final   2017 95.2 80.0 - 100.0 mm Hg Final   2017 97.8 80.0 - 100.0 mm Hg Final   2017 97.4 80.0 - 100.0 mm Hg  Final   06/03/2017 99.8 80.0 - 100.0 mm Hg Final   06/03/2017 180.2 (H) 80.0 - 100.0 mm Hg Final      pCO2 pCO2, Arterial   Date Value Ref Range Status   06/05/2017 40.4 35.0 - 45.0 mm Hg Final   06/04/2017 41.9 35.0 - 45.0 mm Hg Final   06/04/2017 45.0 35.0 - 45.0 mm Hg Final   06/03/2017 40.4 35.0 - 45.0 mm Hg Final   06/03/2017 38.8 35.0 - 45.0 mm Hg Final   06/03/2017 22.4 (C) 35.0 - 45.0 mm Hg Final      HCO3 HCO3, Arterial   Date Value Ref Range Status   06/05/2017 22.2 22.0 - 26.0 mmol/L Final   06/04/2017 22.9 22.0 - 26.0 mmol/L Final   06/04/2017 21.5 (L) 22.0 - 26.0 mmol/L Final   06/03/2017 21.7 (L) 22.0 - 26.0 mmol/L Final   06/03/2017 19.7 (C) 22.0 - 26.0 mmol/L Final   06/03/2017 18.5 (C) 22.0 - 26.0 mmol/L Final      -------------------------------------------------------------------------------------------------------------------  Current Hospital Meds:  aspirin 325 mg Oral Daily   atorvastatin 20 mg Oral Nightly   castor oil-balsam peru  Topical Q12H   cefepime 2 g Intravenous Q8H   enoxaparin 40 mg Subcutaneous Nightly   ipratropium-albuterol 3 mL Nebulization 4x Daily - RT   methylPREDNISolone sodium succinate 20 mg Intravenous Q12H   metoclopramide 5 mg Intravenous TID   metroNIDAZOLE 500 mg Intravenous Q8H   pantoprazole 40 mg Intravenous Q AM   Pharmacy Meds to Bed Consult  Does not apply Daily   polyethylene glycol 17 g Oral Daily   sodium chloride 10 mL Intracatheter Q12H   sodium chloride 10 mL Intracatheter Q12H   vancomycin 1,250 mg Intravenous Q12H     propofol 5-50 mcg/kg/min Last Rate: 45 mcg/kg/min (06/05/17 0721)     Diet:  Vital High Protein; Tube Feeding Type: Continuous; Continuous Tube Feeding Start Rate (mL/hr): 10; Goal Rate of (mL/hr): 35; Patient is NPO (No Tray)  -------------------------------------------------------------------------------------------------------------------  Vital Signs:  Temp:  [98.5 °F (36.9 °C)-98.9 °F (37.2 °C)] 98.5 °F (36.9 °C)  Heart Rate:  [50-92]  92  Resp:  [20-21] 21  BP: (104-151)/(44-82) 135/66  FiO2 (%):  [40 %] 40 %  Last 3 weights    06/02/17  0400 06/03/17  0400 06/04/17  0605   Weight: 236 lb (107 kg) 232 lb (105 kg) 230 lb (104 kg)     Body mass index is 34.97 kg/(m^2).       -------------------------------------------------------------------------------------------------------------------  Physical exam:   Constitutional: Well-developed and well-nourished. Moderate to severe respiratory distress. Sedated and intubated.    HENT: Head: Normocephalic and atraumatic.  Mouth: Moist mucous membranes.  Oral ETT and oral Gtube.  Eyes: Conjunctivae and EOM are normal. Pupils are equal, round, and reactive to light. No scleral icterus.  Neck: Neck supple. No JVD present.    Cardiovascular: Normal rate, regular rhythm and normal heart sounds with no murmur.  Pulmonary/Chest: Moderate to severe respiratory distress, no wheezes, diffuse rhonchi, no crackles, with normal breath sounds and fair air movement.  Abdominal: Soft. Bowel sounds are normal. No distension and no tenderness.   Musculoskeletal: No edema, no tenderness, and no deformity. No red or swollen joints anywhere.   Neurological: Sedated and on the ventilator.   Skin: Skin is warm and dry. No rash noted. No pallor.   Peripheral vascular: No edema and strong pulses on all 4 extremities.  Genitourinary: Shetty catheter in place, yellow urine in the collection container.  -------------------------------------------------------------------------------------------------------------------  Tele:  NS 60; I have personally reviewed/looked at the telemetry strips.    ECHO 5/31/2017:    -------------------------------------------------------------------------------------------------------------------  Lab Results   Component Value Date    CKTOTAL 22 (L) 06/02/2017    CKMB 0.31 06/02/2017    CKMBINDEX 1.4 06/02/2017    TROPONINI 0.117 (H) 06/03/2017     Results from last 7 days  Lab Units 06/05/17  0134  06/04/17  0355 06/03/17  0059 06/03/17  0058  06/01/17  0527 05/31/17  0446 05/31/17  0115  05/30/17  2120 05/30/17  1704 05/30/17  1649   CRP mg/dL 5.39* 8.09* 10.69*  --   < > 10.96*  --  5.64*  < >  --   --   --    LACTATE mmol/L  --   --   --   --   --  1.5  --   --   --  2.4* 2.7*  --    WBC 10*3/mm3 8.67 7.33  --  8.22  < > 12.76*  --  13.99*  --   --   --  18.80*   HEMOGLOBIN g/dL 11.7* 10.4*  --  9.9*  < > 11.4*  --  12.5  --   --   --  14.4   HEMATOCRIT % 37.9 34.9*  --  32.8*  < > 36.9*  --  39.7  --   --   --  47.0   MCV fL 86.5 88.4  --  87.7  < > 87.2  --  85.9  --   --   --  88.5   MCHC g/dL 30.9* 29.8*  --  30.2*  < > 30.9*  --  31.5*  --   --   --  30.6*   PLATELETS 10*3/mm3 272 227  --  218  < > 203  --  204  --   --   --  298   INR   --   --   --   --   --  1.02 1.03 0.98  --   --   --  0.93   < > = values in this interval not displayed.    Results from last 7 days  Lab Units 06/05/17  0422   PH, ARTERIAL pH units 7.358   PO2 ART mm Hg 81.6   PCO2, ARTERIAL mm Hg 40.4   HCO3 ART mmol/L 22.2     Results from last 7 days  Lab Units 06/05/17  0134 06/04/17  0355 06/03/17  0921 06/03/17  0058   SODIUM mmol/L 142 145  --  145   POTASSIUM mmol/L 4.9 4.0 4.4 3.6   MAGNESIUM mg/dL 2.4 2.4  --  2.3   CHLORIDE mmol/L 113* 117*  --  116*   TOTAL CO2 mmol/L 24.5 25.0  --  25.9   BUN mg/dL 24* 23*  --  23*   CREATININE mg/dL 0.58 0.56  --  0.64   EGFR IF NONAFRICN AM mL/min/1.73 103 107  --  92   CALCIUM mg/dL 9.5 9.1  --  8.6   PHOSPHORUS mg/dL 3.4 4.6*  --  3.7   GLUCOSE mg/dL 175* 94  --  100   ALBUMIN g/dL 3.40 3.10*  --  3.00*   BILIRUBIN mg/dL 0.2 0.2  --  0.2   ALK PHOS U/L 63 58  --  56   AST (SGOT) U/L 24 22  --  13   ALT (SGPT) U/L 18 14  --  12   Estimated Creatinine Clearance: 82.5 mL/min (by C-G formula based on Cr of 0.58).    Results from last 7 days  Lab Units 06/03/17  0058 06/02/17  1945 06/02/17  1448 06/01/17  0527 05/31/17  1101 05/31/17  0446 05/30/17  2331   CK TOTAL U/L  --   --  22* 33   --  45 51   TROPONIN I ng/mL 0.117* 0.126* 0.145* 0.220* 0.450* 0.743* 1.117*   CK MB INDEX %  --   --  1.4 5.9*  --   --  5.6*   MYOGLOBIN ng/mL  --   --   --  47.0 91.0 75.0 98.0     Lab Results   Component Value Date    FREET4 1.88 (H) 05/31/2017     Blood Culture   Date Value Ref Range Status   05/31/2017 No growth at 4 days  Preliminary   05/31/2017 No growth at 4 days  Preliminary   05/30/2017 No growth at 5 days  Final   05/30/2017 Gram positive cocci, consistent with Staph spp (A)  Preliminary   -------------------------------------------------------------------------------------------------------------------  Imaging Results (last 72 hours)     Procedure Component Value Units Date/Time    XR Chest AP [[id number]] Collected:  06/03/17 0846     Updated:  06/03/17 0849    COMPARISON: 06/01/2017   FINDINGS:   There is bibasilar atelectasis.   Lungs are otherwise aerated. Right PICC line now with tip in SVC.  Support tube and lines are in unchanged position.   Heart size is stable.  No pneumothorax.   Tiny bilateral pleural effusions persist.     Impression:       Right PICC line now with tip in SVC.  This report was finalized on 6/3/2017 8:47 AM by Dr. Bill Parham MD.         XR Chest AP [[id number]] Collected:  06/04/17 0902     Updated:  06/04/17 0905    COMPARISON: 6/3/2017   FINDINGS:   There is bibasilar atelectasis.   Slightly improved right upper lobe airspace disease.  Support tube and lines are in unchanged position.   Heart size is stable.  No pneumothorax.   Tiny bilateral pleural effusions persist.     Impression:       As above.  This report was finalized on 6/4/2017 9:03 AM by Dr. Bill Parham MD.         XR Chest AP [[id number]] Collected:  06/05/17 0725     Updated:  06/05/17 0817    COMPARISON: 06/04/2017.   FINDINGS:  There is no line change.  There is a small left effusion as well as bilateral consolidation and  evidence of at least mild congestive heart failure.  There is no evidence of an  acute osseous abnormality.   There are no suspicious-appearing parenchymal soft tissue nodules.    Impression:       Mild congestive heart failure and bilateral consolidation.      This report was finalized on 6/5/2017 8:15 AM by Dr. Mauricio Santiago MD.            CXR 6/5/2017:             I have personally looked at the radiology images and read the final radiology report.  -------------------------------------------------------------------------------------------------------------------  Assesment and Plan:  -Acute hypoxic and hypercapnic respiratory failure and sepsis due to right middle lobe and right lower lobe pneumonia  -Primary respiratory acidosis and a secondary compensatory metabolic alkalosis, now improved  -Bilateral pleural effusions  -Lactic acidosis that has resolved  -Prolonged QTc of 559 ms, down to 511 ms by 6/2/2017   -Sinus bradycardia, now resolved  -Elevated troponins concerning for type 2 myocardial infarction/NSTEMI, but Dr. Awan does not think that she had a NSTEMI  -Type 2 diabetes mellitus (Ha1c 7.3 this admission), newly diagnosed this admission  -Diastolic congestive heart failure with a mild exacerbation due to needed medical care, now resolved  -Mild pulmonary hypertension  -Hyperglycemia, no history of diabetes mellitus  -Acute COPD exacerbation, improved  -Elevated liver enzymes, resolved  -Morbid obesity  -Possible acute renal failure on top of chronic kidney disease Stage 3 (baseline Cr now 0.6 and previously thought to be 0.94-1.02)  -Mild hyperkalemia that has improved  -Past heavy tobacco smoking addiction    Will continue the cefepime, vancomycin, and flagyl as the blood culture has not been identified yet; Dr. Mercer is managing the antibiotics.  Will repeat blood work in the morning.  Dr. Peter is managing the vent; she is on low vent settings.  Tube feeds to resume once the OG placement is confirmed; CXR confirms OG placement per the final radiology read.  Await   [place] to see the patient; she is on low vent settings so suspect that she will start having spontaneous breathing trials soon.  Cardiology to evaluate her heart once she is off the vent.    The patient is high risk due to the following diagnoses/reasons:  Sepsis and acute respiratory failure    Total ICU time today is:  30 minutes    Todd Trammell MD  06/05/17  8:23 AM

## 2017-06-05 NOTE — PROGRESS NOTES
"  I have personally seen and examined the patient today and discussed overnight interval progress and pertinent issues with nursing staff.    Review of Systems-unable to obtain as the patient is sedated and intubated on the ventilator    History taken from: chart RN      Vital Signs    /90  Pulse 98  Temp 98.4 °F (36.9 °C) (Oral)   Resp 20  Ht 68\" (172.7 cm)  Wt 230 lb (104 kg)  SpO2 97%  BMI 34.97 kg/m2    Temp:  [98.4 °F (36.9 °C)-98.9 °F (37.2 °C)] 98.4 °F (36.9 °C)      Intake/Output Summary (Last 24 hours) at 06/05/17 1015  Last data filed at 06/05/17 0926   Gross per 24 hour   Intake           2137.6 ml   Output             3370 ml   Net          -1232.4 ml     Intake & Output (last 3 days)       06/02 0701 - 06/03 0700 06/03 0701 - 06/04 0700 06/04 0701 - 06/05 0700 06/05 0701 - 06/06 0700    I.V. (mL/kg) 633.9 (6) 598.8 (5.7) 299.6 (2.9)     Other 300 190 50 80    NG/GT  118  508    IV Piggyback 0948 999 9805 200    Total Intake(mL/kg) 2083.9 (19.8) 1806.8 (17.3) 1449.6 (13.9) 788 (7.6)    Urine (mL/kg/hr) 2680 (1.1) 3075 (1.2) 3045 (1.2) 325 (1)    Other 700 (0.3)       Stool  0 (0)      Total Output 3380 3075 3045 325    Net -1296.2 -1268.2 -1595.4 +463            Unmeasured Stool Occurrence  3 x             Physical Exam:      General Appearance:  Intubated, sedated, face is red    Head:  Normocephalic, without obvious abnormality, atraumatic   Eyes:      Lids and lashes normal, conjunctivae and sclerae normal, no icterus, no pallor, corneas clear, PERRLA   Ears:  Ears appear intact with no abnormalities noted   Throat: No oral lesions, no thrush, oral mucosa moist   Neck: No adenopathy, supple, trachea midline, no thyromegaly, no carotid bruit, no JVD   Back:  No tenderness to percussion or palpation, range of motion normal   Lungs:  Wheezing bilaterally on inspiration    Heart:  Regular rhythm and normal rate, normal S1 and S2, no murmur, no gallop, no rub, no click   Chest Wall:  No " abnormalities observed   Abdomen:  Distended, Normal bowel sounds, no masses, no organomegaly, non-tender, no guarding, no rebound tenderness   Rectal:  Deferred   Extremities: Moves all extremities well, no edema, no cyanosis, no redness   Pulses: Pulses palpable and equal bilaterally   Skin: No bleeding, bruising or rash   Lymph nodes: No palpable adenopathy   Neurologic: Sedated            Results:      Results from last 7 days  Lab Units 06/05/17  0134 06/04/17  0355 06/03/17  0058 06/02/17  0315 06/01/17  0527 05/31/17  0115 05/30/17  1649   WBC 10*3/mm3 8.67 7.33 8.22 11.48 12.76* 13.99* 18.80*     Lab Results   Component Value Date    NEUTROABS 6.95 (H) 06/05/2017    NEUTROABS 7.80 (H) 06/05/2017         Results from last 7 days  Lab Units 06/05/17  0134   CREATININE mg/dL 0.58         Results from last 7 days  Lab Units 06/05/17  0134 06/04/17  0355 06/03/17  0059   CRP mg/dL 5.39* 8.09* 10.69*       Results Review:    I have personally reviewed laboratory data, culture results, radiology studies and antimicrobial therapy.    Hospital Medications (active)       Dose Frequency Start End    aspirin tablet 325 mg 325 mg Daily 5/31/2017     Sig - Route: Take 1 tablet by mouth Daily. - Oral    atorvastatin (LIPITOR) tablet 20 mg 20 mg Nightly 5/31/2017     Sig - Route: Take 1 tablet by mouth Every Night. - Oral    castor oil-balsam peru (VENELEX) ointment  Every 12 Hours Scheduled 5/31/2017     Sig - Route: Apply  topically Every 12 (Twelve) Hours. - Topical    cefepime (MAXIPIME) 2 g/100 mL 0.9% NS (mbp) 2 g Every 8 Hours Scheduled 5/31/2017     Sig - Route: Infuse 100 mL into a venous catheter Every 8 (Eight) Hours. - Intravenous    DOPamine 400 mg/250 mL (1.6 mg/mL) infusion 5 mcg/kg/min × 107 kg Titrated 5/31/2017     Sig - Route: Infuse 535 mcg/min into a venous catheter Dose Adjusted By Provider As Needed. - Intravenous    enoxaparin (LOVENOX) syringe 40 mg 40 mg Nightly 6/1/2017     Sig - Route: Inject 0.4  "mL under the skin Every Night. - Subcutaneous    furosemide (LASIX) injection 60 mg 60 mg Once 6/2/2017 6/2/2017    Sig - Route: Infuse 6 mL into a venous catheter 1 (One) Time. - Intravenous    ipratropium-albuterol (DUO-NEB) nebulizer solution 3 mL 3 mL 4 Times Daily - RT 5/31/2017     Sig - Route: Take 3 mL by nebulization 4 (Four) Times a Day. - Nebulization    Magnesium Sulfate 2 gram Bolus, followed by 8 gram infusion (total Mg dose 10 grams)- Mg less than or equal to 1mg/dL 2 g As Needed 5/31/2017     Sig - Route: Infuse 50 mL into a venous catheter As Needed (Mg less than or equal to 1mg/dL). - Intravenous    Linked Group 1:  \"Or\" Linked Group Details        magnesium sulfate 4 gram infusion- Mg 1.6-1.9 mg/dL 4 g As Needed 5/31/2017     Sig - Route: Infuse 100 mL into a venous catheter As Needed (Mg 1.6-1.9 mg/dL). - Intravenous    Linked Group 1:  \"Or\" Linked Group Details        Magnesium Sulfate 6 gram Infusion (2 gm x 3) -Mg 1.1 -1.5 mg/dL 2 g As Needed 5/31/2017     Sig - Route: Infuse 50 mL into a venous catheter As Needed (Mg 1.1 -1.5 mg/dL). - Intravenous    Linked Group 1:  \"Or\" Linked Group Details        metroNIDAZOLE (FLAGYL) IVPB 500 mg 500 mg Every 8 Hours Scheduled 5/31/2017     Sig - Route: Infuse 100 mL into a venous catheter Every 8 (Eight) Hours. - Intravenous    Pharmacy Meds to Bed Consult  Daily 5/31/2017     Sig - Route: Daily. - Does not apply    potassium chloride (KLOR-CON) packet 40 mEq 40 mEq As Needed 5/31/2017     Sig - Route: Take 40 mEq by mouth As Needed (potassium replacement, see admin instructions). - Oral    Linked Group 2:  \"Or\" Linked Group Details        potassium chloride (KLOR-CON) packet 40 mEq 40 mEq Once 6/2/2017 6/2/2017    Sig - Route: Take 40 mEq by mouth 1 (One) Time. - Oral    potassium chloride (MICRO-K) CR capsule 40 mEq 40 mEq As Needed 5/31/2017     Sig - Route: Take 4 capsules by mouth As Needed (potassium replacement.  see admin instructions). - Oral    " "Linked Group 2:  \"Or\" Linked Group Details        potassium chloride 10 mEq in 100 mL IVPB 10 mEq Every 1 Hour PRN 5/31/2017     Sig - Route: Infuse 100 mL into a venous catheter Every 1 (One) Hour As Needed (potassium protocol PERIPHERAL - see admin instructions). - Intravenous    Linked Group 2:  \"Or\" Linked Group Details        propofol (DIPRIVAN) infusion 10 mg/mL 100 mL 5-50 mcg/kg/min × 107 kg Titrated 5/31/2017     Sig - Route: Infuse 535-5,350 mcg/min into a venous catheter Dose Adjusted By Provider As Needed. - Intravenous    Notes to Pharmacy: Order received from Dr Peter to start diprivan per protocol if patient remains bradycardic on precedex IV    sodium chloride (NS) 0.9 % irrigation solution  - ADS Override Pull   6/2/2017 6/2/2017    Notes to Pharmacy: Created by cabinet override    sodium chloride 0.9 % flush 1-10 mL 1-10 mL As Needed 5/30/2017     Sig - Route: Infuse 1-10 mL into a venous catheter As Needed for Line Care. - Intravenous    vancomycin (VANCOCIN) 1,250 mg in sodium chloride 0.9 % 250 mL IVPB 1,250 mg Every 18 Hours 6/1/2017     Sig - Route: Infuse 1,250 mg into a venous catheter Every 18 (Eighteen) Hours. - Intravenous    dexmedetomidine HCl (PRECEDEX) 400 mcg in sodium chloride 0.9 % 100 mL (4 mcg/mL) infusion (Discontinued) 0.2-1.5 mcg/kg/hr × 107 kg Titrated 5/31/2017 6/2/2017    Sig - Route: Infuse 21.4-160.5 mcg/hr into a venous catheter Dose Adjusted By Provider As Needed. - Intravenous    doxycycline (VIBRAMYCIN) 100 mg/100 mL 0.9% NS MBP (Discontinued) 100 mg Every 12 Hours 5/31/2017 6/1/2017    Sig - Route: Infuse 100 mL into a venous catheter Every 12 (Twelve) Hours. - Intravenous    heparin (porcine) 5000 UNIT/ML injection 2,500 Units (Discontinued) 23.4 Units/kg × 107 kg As Needed 5/31/2017 6/1/2017    Sig - Route: Infuse 0.5 mL into a venous catheter As Needed (Per Heparin Nomogram For PTT 45-55). - Intravenous    Reason for Discontinue: Drug interaction    heparin " (porcine) 5000 UNIT/ML injection 5,000 Units (Discontinued) 46.7 Units/kg × 107 kg As Needed 5/31/2017 6/1/2017    Sig - Route: Infuse 1 mL into a venous catheter As Needed (Per Heparin Nomogram For PTT Less Than 45). - Intravenous    Reason for Discontinue: Drug interaction    heparin 37351 units/250 mL (100 units/mL) in 0.45 % NaCl infusion (Discontinued) 9.35 Units/kg/hr × 81.1 kg (Adjusted) Titrated 5/31/2017 6/1/2017    Sig - Route: Infuse 758.285 Units/hr into a venous catheter Dose Adjusted By Provider As Needed. - Intravenous    sodium chloride 0.9 % infusion (Discontinued) 100 mL/hr Continuous 5/30/2017 6/2/2017    Sig - Route: Infuse 100 mL/hr into a venous catheter Continuous. - Intravenous            Cultures:    Blood Culture   Date Value Ref Range Status   05/31/2017 No growth at 24 hours  Preliminary   05/31/2017 No growth at 24 hours  Preliminary   05/30/2017 No growth at 2 days  Preliminary   05/30/2017 Gram positive cocci, consistent with Staph spp (A)  Preliminary       PROBLEM LIST:    Patient Active Problem List   Diagnosis   • Pneumonia   • Sepsis due to pneumonia       Assessment/Plan     ASSESSMENT:    1. Sepsis  2. Staph bacteremia  3. Pneumonia     PLAN:    Blood cultures from 5/30/2017 still showing staph 1 set out of 2 which is most likely a contaminant in the setting of such slow growth.  Otherwise remaining cultures including BAL are showing no growth.  Normal white count and significantly improved CRP level.  We will continue to monitor closely.  Hope to de-escalate soon if the patient continues to show persistent progress.      Patients findings and recommendations were discussed with nursing staff    Code Status: Full Code    Rosalia Pinto PA-C  06/05/17  10:15 AM    Physician Attestation:    I have personally seen and examined the patient. I reviewed the patient's data including history of present illness, review of systems, physical examination, assessment and treatment plan  and agree with findings above. The assessment and plan are my own.  I have reviewed and edited the note above after discussing the findings with Rosalia Pinto PA-C.    James Mercer MD  Infectious Diseases  06/05/17  10:52 AM

## 2017-06-05 NOTE — PROGRESS NOTES
"Reason for follow-up:  Acute non-ST elevation MI- Type II    Subjective:  Patient is still intubated on mechanical ventilator  Remain hemodynamically stable      Medication Review:     aspirin 325 mg Oral Daily   atorvastatin 20 mg Oral Nightly   castor oil-balsam peru  Topical Q12H   cefepime 2 g Intravenous Q8H   enoxaparin 40 mg Subcutaneous Nightly   ipratropium-albuterol 3 mL Nebulization 4x Daily - RT   methylPREDNISolone sodium succinate 20 mg Intravenous Q12H   metoclopramide 5 mg Intravenous TID   metroNIDAZOLE 500 mg Intravenous Q8H   pantoprazole 40 mg Intravenous Q AM   Pharmacy Meds to Bed Consult  Does not apply Daily   polyethylene glycol 17 g Oral Daily   sodium chloride 10 mL Intracatheter Q12H   sodium chloride 10 mL Intracatheter Q12H   vancomycin 1,250 mg Intravenous Q12H         Vital Sign Min/Max for last 24 hours  Temp  Min: 98.5 °F (36.9 °C)  Max: 98.9 °F (37.2 °C)   BP  Min: 104/61  Max: 151/73   Pulse  Min: 50  Max: 92   Resp  Min: 20  Max: 21   SpO2  Min: 95 %  Max: 100 %   No Data Recorded   No Data Recorded     Flowsheet Rows         First Filed Value    Admission Height  68\" (172.7 cm) Documented at 05/30/2017 1611    Admission Weight  230 lb (104 kg) Documented at 05/30/2017 1611          Physical Exam:     General Appearance:  Intubated on mechanical ventilator    Head:  Normocephalic, without obvious abnormality, atraumatic   Eyes:      Lids and lashes normal, conjunctivae and sclerae normal, no icterus, no pallor, corneas clear, PERRLA   Ears:  Ears appear intact with no abnormalities noted   Throat: No oral lesions, no thrush, oral mucosa moist   Neck: No adenopathy, supple, trachea midline, no thyromegaly, no carotid bruit, no JVD   Back:  No kyphosis present, no scoliosis present, no skin lesions, erythema or scars, no tenderness to percussion or palpation, range of motion normal   Lungs:  Bilateral equal air entry. Bilateral wheezes were heard    Heart:  Regular rhythm and " normal rate, normal S1 and S2, no murmur, no gallop, no rub, no click   Chest Wall:  No abnormalities observed   Abdomen:  Normal bowel sounds, no masses, no organomegaly, soft non-tender, non-distended, no guarding, no rebound tenderness   Rectal:  Deferred   Extremities: Moves all extremities well, no edema, no cyanosis, no redness          Telemetry  Normal sinus rhythm.  No episodes of bradycardia      Labs    Results from last 7 days  Lab Units 06/05/17  0134 06/04/17  0355 06/03/17 0058 06/02/17 0315 06/01/17 0527 05/31/17  0115 05/30/17  1649   WBC 10*3/mm3 8.67 7.33 8.22 11.48 12.76* 13.99* 18.80*   HEMOGLOBIN g/dL 11.7* 10.4* 9.9* 10.7* 11.4* 12.5 14.4   HEMATOCRIT % 37.9 34.9* 32.8* 35.1* 36.9* 39.7 47.0   PLATELETS 10*3/mm3 272 227 218 194 203 204 298       Results from last 7 days  Lab Units 06/05/17  0134 06/04/17  0355 06/03/17  0921 06/03/17 0058 06/02/17 0315 06/01/17 0527 05/31/17  0115 05/30/17  1649   SODIUM mmol/L 142 145  --  145 143 142 141 138   POTASSIUM mmol/L 4.9 4.0 4.4 3.6 4.0 3.9 3.6 5.5*   CHLORIDE mmol/L 113* 117*  --  116* 118* 115* 108 104   TOTAL CO2 mmol/L 24.5 25.0  --  25.9 23.6* 25.5 24.7 31.4   BUN mg/dL 24* 23*  --  23* 16 19 20 13   CREATININE mg/dL 0.58 0.56  --  0.64 0.62 0.68 1.17 1.02   CALCIUM mg/dL 9.5 9.1  --  8.6 8.6 8.6 8.9 9.2   GLUCOSE mg/dL 175* 94  --  100 111* 205* 201* 308*       Results from last 7 days  Lab Units 06/05/17  0134 06/04/17  0355 06/03/17  0058 06/02/17  0315 06/01/17  0527 05/31/17  0115 05/30/17  1649   BILIRUBIN mg/dL 0.2 0.2 0.2 0.2 0.2 0.4 0.5   ALK PHOS U/L 63 58 56 60 67 84 106*   AST (SGOT) U/L 24 22 13 14 17 44* 54*   ALT (SGPT) U/L 18 14 12 14 21 33 34       Results from last 7 days  Lab Units 06/05/17  0134 06/04/17  0355 06/03/17  0058 06/02/17  0315 06/01/17  0527 05/31/17  0446   MAGNESIUM mg/dL 2.4 2.4 2.3 2.4 2.6 1.6*       Results from last 7 days  Lab Units 06/01/17  0527 05/31/17  0446 05/31/17  0115 05/30/17  1649   INR   1.02 1.03 0.98 0.93       Results from last 7 days  Lab Units 06/05/17  0134 06/04/17  0355 06/03/17  0059   CRP mg/dL 5.39* 8.09* 10.69*       Results from last 7 days  Lab Units 06/03/17  0058 06/02/17  1945 06/02/17  1448 06/01/17  0527 05/31/17  1101 05/31/17  0446 05/30/17  2331   CK TOTAL U/L  --   --  22* 33  --  45 51   TROPONIN I ng/mL 0.117* 0.126* 0.145* 0.220* 0.450* 0.743* 1.117*   CK MB INDEX %  --   --  1.4 5.9*  --   --  5.6*   MYOGLOBIN ng/mL  --   --   --  47.0 91.0 75.0 98.0       Results from last 7 days  Lab Units 06/05/17  0422   PH, ARTERIAL pH units 7.358   PO2 ART mm Hg 81.6   PCO2, ARTERIAL mm Hg 40.4   HCO3 ART mmol/L 22.2         Assessment    1.  Acute non-ST elevation MI-Type II-stable  2.  Acute hypoxic/hypercapnic respiratory failure due to exacerbation of COPD and pneumonia on mechanical ventilator     Plan    Continue aspirin and atorvastatin  Beta blockers is not considered because of occurrence of episodes of bradycardia  Nuclear stress test once extubated done respiratory status improves  As her condition is stable, I will sign off and please call me if needed.    .    Yin Mae MD  06/05/17  9:04 AM

## 2017-06-05 NOTE — PROGRESS NOTES
LOS: 6 days   Patient Care Team:  ERASTO Lee as PCP - General (Family Medicine)    Chief Complaint:  Pulmonology is following for pneumonia and respiratory failure    Subjective     Interval History: patient is intubated and sedated.  No acute events reported overnight.      History taken from: chart RN Patient unable to give history due to patient intubated.    Review of Systems:   Review of Systems - History obtained from chart review and unobtainable from patient due to mental status and Intubated      Objective     Vital Signs  Temp:  [98.4 °F (36.9 °C)-98.9 °F (37.2 °C)] 98.4 °F (36.9 °C)  Heart Rate:  [50-92] 70  Resp:  [20-21] 21  BP: (104-151)/(44-82) 108/49  FiO2 (%):  [40 %] 40 %  Body mass index is 34.97 kg/(m^2).    Intake/Output Summary (Last 24 hours) at 06/05/17 0951  Last data filed at 06/05/17 0926   Gross per 24 hour   Intake           2137.6 ml   Output             3370 ml   Net          -1232.4 ml     I/O this shift:  In: 788 [Other:80; NG/GT:508; IV Piggyback:200]  Out: 325 [Urine:325]    Physical Exam:  GENERAL APPEARANCE: Intubated and sedated.  Appears to be resting comfortably in bed.     HEAD: normocephalic.    EYES: PERRLA.    THROAT: ET tube in place. Oral cavity and pharynx normal. No inflammation, swelling, exudate, or lesions.     NECK: Neck supple.     CARDIAC: Normal S1 and S2. No S3, S4 or murmurs. Rhythm is regular.     LUNGS: Clear to auscultation and percussion without rales, rhonchi, wheezing or diminished breath sounds.    ABDOMEN: Positive bowel sounds. Soft, nondistended, nontender.     EXTREMITIES: No significant deformity or joint abnormality. No edema. Peripheral pulses intact.    NEUROLOGICAL: Unable to assess due to sedation status.     PSYCHIATRIC: Unable to assess due to sedation status.     Results Review:     I reviewed the patient's new clinical results.  I reviewed the patient's new imaging results and agree with the interpretation.    Results from last 7  days  Lab Units 06/05/17  0134 06/04/17  0355 06/03/17  0058   WBC 10*3/mm3 8.67 7.33 8.22   HEMOGLOBIN g/dL 11.7* 10.4* 9.9*   PLATELETS 10*3/mm3 272 227 218       Results from last 7 days  Lab Units 06/05/17  0134 06/04/17  0355 06/03/17  0921 06/03/17  0058   SODIUM mmol/L 142 145  --  145   POTASSIUM mmol/L 4.9 4.0 4.4 3.6   CHLORIDE mmol/L 113* 117*  --  116*   TOTAL CO2 mmol/L 24.5 25.0  --  25.9   BUN mg/dL 24* 23*  --  23*   CREATININE mg/dL 0.58 0.56  --  0.64   CALCIUM mg/dL 9.5 9.1  --  8.6   GLUCOSE mg/dL 175* 94  --  100   MAGNESIUM mg/dL 2.4 2.4  --  2.3     Lab Results   Component Value Date    INR 1.02 06/01/2017    INR 1.03 05/31/2017    INR 0.98 05/31/2017    PROTIME 11.3 06/01/2017    PROTIME 11.5 05/31/2017    PROTIME 10.9 05/31/2017       Results from last 7 days  Lab Units 06/05/17  0134 06/04/17 0355 06/03/17  0058   ALK PHOS U/L 63 58 56   BILIRUBIN mg/dL 0.2 0.2 0.2   ALT (SGPT) U/L 18 14 12   AST (SGOT) U/L 24 22 13       Results from last 7 days  Lab Units 06/05/17  0422   PH, ARTERIAL pH units 7.358   PO2 ART mm Hg 81.6   PCO2, ARTERIAL mm Hg 40.4   HCO3 ART mmol/L 22.2     Imaging Results (last 24 hours)     Procedure Component Value Units Date/Time    XR Chest AP [[id number]] Collected:  06/05/17 0725     Updated:  06/05/17 0817    Narrative:       XR CHEST AP-     CLINICAL INDICATION: Respiratory failure; J18.9-Pneumonia, unspecified  organism; J96.01-Acute respiratory failure with hypoxia; J96.02-Acute  respiratory failure with hypercapnia; J18.9-Pneumonia, unspecified  organism.          COMPARISON: 06/04/2017.      TECHNIQUE: Single frontal view of the chest.     FINDINGS:     There is no line change.  There is a small left effusion as well as bilateral consolidation and  evidence of at least mild congestive heart failure.  There is no evidence of an acute osseous abnormality.   There are no suspicious-appearing parenchymal soft tissue nodules.            Impression:       Mild  congestive heart failure and bilateral consolidation.         This report was finalized on 6/5/2017 8:15 AM by Dr. Mauricio Santiago MD.       XR Chest 1 View [[id number]] Collected:  06/05/17 0836     Updated:  06/05/17 0844    Narrative:       XR CHEST 1 VIEW-     CLINICAL INDICATION: OG tube placement; J18.9-Pneumonia, unspecified  organism; J96.01-Acute respiratory failure with hypoxia; J96.02-Acute  respiratory failure with hypercapnia; J18.9-Pneumonia, unspecified  organism.          COMPARISON: 06/05/2017.      TECHNIQUE: Single frontal view of the chest.     FINDINGS:     Nasogastric tube is in the stomach.  Left effusion and left basilar consolidation.  There is no evidence of an acute osseous abnormality.   There are no suspicious-appearing parenchymal soft tissue nodules.            Impression:       NG tube in stomach.         This report was finalized on 6/5/2017 8:42 AM by Dr. Mauricio Santiago MD.                Medication Review:   Scheduled Medications:    aspirin 325 mg Oral Daily   atorvastatin 20 mg Oral Nightly   castor oil-balsam peru  Topical Q12H   cefepime 2 g Intravenous Q8H   enoxaparin 40 mg Subcutaneous Nightly   ipratropium-albuterol 3 mL Nebulization 4x Daily - RT   methylPREDNISolone sodium succinate 20 mg Intravenous Q12H   metoclopramide 5 mg Intravenous TID   metroNIDAZOLE 500 mg Intravenous Q8H   pantoprazole 40 mg Intravenous Q AM   Pharmacy Meds to Bed Consult  Does not apply Daily   polyethylene glycol 17 g Oral Daily   sodium chloride 10 mL Intracatheter Q12H   sodium chloride 10 mL Intracatheter Q12H   vancomycin 1,250 mg Intravenous Q12H     Continuous infusions:    propofol 5-50 mcg/kg/min Last Rate: 45 mcg/kg/min (06/05/17 0721)       Assessment/Plan     Neuro: Continue propofol and fentanyl IV per protocol for sedation.  Will do sedation vacation and spontaneous breathing trial accordingly.      Respiratory: Respiratory failure likely due to pneumonia and history of  smoking.      Vent settings:  RR 21 FiO2 40% PEEP 5  Spontaneous breathing trial was tried and patient failed the breathing trial.    Patient failed breathing trial on Friday.      ABG reviewed this morning.    Decreased FiO2 to 30%.    Bronchoscopy cultures are still pending.    Will do spontaneous breathing trial today.    Continue IV antibiotics, scheduled inhalants and as needed neb treatments.    ID: Continue IV antibiotics.  Microbiology Results (last 10 days)     Procedure Component Value - Date/Time    Respiratory Culture [[id number]] Collected:  06/02/17 1024    Lab Status:  Final result Specimen:  Lavage from Lung, Right Middle Lobe Updated:  06/05/17 0940     Respiratory Culture No growth     Gram Stain Result No organisms seen    Respiratory Culture [[id number]] Collected:  06/02/17 1024    Lab Status:  Final result Specimen:  Lavage from Lung, Left Lower Lobe Updated:  06/05/17 0941     Respiratory Culture No growth     Gram Stain Result No organisms seen    Blood Culture [[id number]]  (Normal) Collected:  05/31/17 1550    Lab Status:  Preliminary result Specimen:  Blood from Arm, Left Updated:  06/04/17 1701     Blood Culture No growth at 4 days    Blood Culture [[id number]]  (Normal) Collected:  05/31/17 1431    Lab Status:  Preliminary result Specimen:  Blood from Hand, Right Updated:  06/04/17 1601     Blood Culture No growth at 4 days    Blood Culture [[id number]]  (Normal) Collected:  05/30/17 1727    Lab Status:  Final result Specimen:  Blood from Arm, Left Updated:  06/04/17 1801     Blood Culture No growth at 5 days    Blood Culture [[id number]]  (Abnormal) Collected:  05/30/17 1649    Lab Status:  Final result Specimen:  Blood from Arm, Left Updated:  06/05/17 1108     Blood Culture Staphylococcus, coagulase negative (A)      Probable contaminant.        Isolated from --     Gram Stain Result Gram positive cocci in clusters          Will start de-escalating antibiotics from tomorrow.  Cardiac:      Hemodynamically Stable.  Continuous ECG and pulse ox monitoring.        Renal: BUN/Creatinine WNL.  Strict I&Os.         Endocrine: monitor glucose targeting 140-180.      Electrolytes:    Monitor levels, manage and replete per protocols.     GI: Tube feedings are on hold for spontaneous breathing trial.      Hematology:    Monitor blood counts, transfuse at or below 7 g/dL.  Unless patient is actively bleeding then began transfusing at 8 g/dL.       Patient Active Problem List   Diagnosis Code   • Pneumonia J18.9   • Sepsis due to pneumonia J18.9, A41.9         Bedside rounds were completed with RN and RRT, discussed case and plan in great detail.    ERASTO Mckeon  06/05/17  9:51 AM        Attestation: Scribed for Dr. Peter, ERASTO Bowden      I, Abdulkadir Peter M.D. attest that the above note accurately reflects the work and decisions made  by me.  Patient was seen and evaluated by Dr. Peter, including history of present illness, physical exam, assessment, and treatment plan.  The above note was reviewed and edited by Dr. Peter. The patient was  discussed with Miss Sim  and I agree with her history physical assessment and plan.  Critical Care time spent in direct patient care: 33 minutes (excluding procedure time, if applicable) including high complexity decision making to assess, manipulate, and support vital organ system failure in this individual who has impairment of one or more vital organ systems such that there is a high probability of imminent or life threatening deterioration in the patient’s condition.

## 2017-06-06 ENCOUNTER — APPOINTMENT (OUTPATIENT)
Dept: GENERAL RADIOLOGY | Facility: HOSPITAL | Age: 72
End: 2017-06-06

## 2017-06-06 LAB
A-A DO2: 53.8 MMHG (ref 0–300)
A-A DO2: 71.8 MMHG (ref 0–300)
ALBUMIN SERPL-MCNC: 3.1 G/DL (ref 3.4–4.8)
ALBUMIN/GLOB SERPL: 0.9 G/DL (ref 1.5–2.5)
ALP SERPL-CCNC: 54 U/L (ref 35–104)
ALT SERPL W P-5'-P-CCNC: 18 U/L (ref 10–36)
ANION GAP SERPL CALCULATED.3IONS-SCNC: 3.3 MMOL/L (ref 3.6–11.2)
ARTERIAL PATENCY WRIST A: ABNORMAL
ARTERIAL PATENCY WRIST A: POSITIVE
AST SERPL-CCNC: 25 U/L (ref 10–30)
ATMOSPHERIC PRESS: 723 MMHG
ATMOSPHERIC PRESS: 723 MMHG
BASE EXCESS BLDA CALC-SCNC: -1.8 MMOL/L
BASE EXCESS BLDA CALC-SCNC: -3.4 MMOL/L
BDY SITE: ABNORMAL
BDY SITE: ABNORMAL
BILIRUB SERPL-MCNC: 0.2 MG/DL (ref 0.2–1.8)
BODY TEMPERATURE: 98.6 C
BODY TEMPERATURE: 98.6 C
BUN BLD-MCNC: 28 MG/DL (ref 7–21)
BUN/CREAT SERPL: 56 (ref 7–25)
CALCIUM SPEC-SCNC: 9.3 MG/DL (ref 7.7–10)
CHLORIDE SERPL-SCNC: 116 MMOL/L (ref 99–112)
CO2 SERPL-SCNC: 25.7 MMOL/L (ref 24.3–31.9)
COHGB MFR BLD: 1.1 % (ref 0–5)
COHGB MFR BLD: 1.4 % (ref 0–5)
CREAT BLD-MCNC: 0.5 MG/DL (ref 0.43–1.29)
CRP SERPL-MCNC: 1.64 MG/DL (ref 0–0.99)
DEPRECATED RDW RBC AUTO: 42.2 FL (ref 37–54)
EOSINOPHIL # BLD MANUAL: 0.27 10*3/MM3 (ref 0–0.7)
EOSINOPHIL NFR BLD MANUAL: 3 % (ref 0–7)
ERYTHROCYTE [DISTWIDTH] IN BLOOD BY AUTOMATED COUNT: 13.5 % (ref 11.5–14.5)
GFR SERPL CREATININE-BSD FRML MDRD: 122 ML/MIN/1.73
GLOBULIN UR ELPH-MCNC: 3.5 GM/DL
GLUCOSE BLD-MCNC: 140 MG/DL (ref 70–110)
GLUCOSE BLDC GLUCOMTR-MCNC: 126 MG/DL (ref 70–130)
GLUCOSE BLDC GLUCOMTR-MCNC: 127 MG/DL (ref 70–130)
GLUCOSE BLDC GLUCOMTR-MCNC: 129 MG/DL (ref 70–130)
GLUCOSE BLDC GLUCOMTR-MCNC: 138 MG/DL (ref 70–130)
GLUCOSE BLDC GLUCOMTR-MCNC: 157 MG/DL (ref 70–130)
GLUCOSE BLDC GLUCOMTR-MCNC: 158 MG/DL (ref 70–130)
HCO3 BLDA-SCNC: 21.5 MMOL/L (ref 22–26)
HCO3 BLDA-SCNC: 24 MMOL/L (ref 22–26)
HCT VFR BLD AUTO: 35.5 % (ref 37–47)
HCT VFR BLD CALC: 34 % (ref 37–47)
HCT VFR BLD CALC: 38 % (ref 37–47)
HGB BLD-MCNC: 10.7 G/DL (ref 12–16)
HGB BLDA-MCNC: 11.5 G/DL (ref 12–16)
HGB BLDA-MCNC: 12.9 G/DL (ref 12–16)
HOROWITZ INDEX BLD+IHG-RTO: 25 %
HOROWITZ INDEX BLD+IHG-RTO: 30 %
HYPOCHROMIA BLD QL: ABNORMAL
LYMPHOCYTES # BLD MANUAL: 1.87 10*3/MM3 (ref 1–3)
LYMPHOCYTES NFR BLD MANUAL: 11 % (ref 0–12)
LYMPHOCYTES NFR BLD MANUAL: 21 % (ref 16–46)
MAGNESIUM SERPL-MCNC: 2.3 MG/DL (ref 1.7–2.6)
MCH RBC QN AUTO: 26.1 PG (ref 27–33)
MCHC RBC AUTO-ENTMCNC: 30.1 G/DL (ref 33–37)
MCV RBC AUTO: 86.6 FL (ref 80–94)
METHGB BLD QL: 0.2 % (ref 0–3)
METHGB BLD QL: 0.3 % (ref 0–3)
MODALITY: ABNORMAL
MODALITY: ABNORMAL
MONOCYTES # BLD AUTO: 0.98 10*3/MM3 (ref 0.1–0.9)
NEUTROPHILS # BLD AUTO: 5.79 10*3/MM3 (ref 1.4–6.5)
NEUTROPHILS NFR BLD MANUAL: 64 % (ref 40–75)
NEUTS BAND NFR BLD MANUAL: 1 % (ref 0–8)
OSMOLALITY SERPL CALC.SUM OF ELEC: 296.5 MOSM/KG (ref 273–305)
OXYHGB MFR BLDV: 88.9 % (ref 85–100)
OXYHGB MFR BLDV: 94.7 % (ref 85–100)
PCO2 BLDA: 38.1 MM HG (ref 35–45)
PCO2 BLDA: 44.4 MM HG (ref 35–45)
PEEP RESPIRATORY: 5 CM[H2O]
PEEP RESPIRATORY: 5 CM[H2O]
PH BLDA: 7.35 PH UNITS (ref 7.35–7.45)
PH BLDA: 7.37 PH UNITS (ref 7.35–7.45)
PHOSPHATE SERPL-MCNC: 2.8 MG/DL (ref 2.7–4.5)
PLAT MORPH BLD: NORMAL
PLATELET # BLD AUTO: 289 10*3/MM3 (ref 130–400)
PMV BLD AUTO: 11.7 FL (ref 6–10)
PO2 BLDA: 62.5 MM HG (ref 80–100)
PO2 BLDA: 86.2 MM HG (ref 80–100)
POTASSIUM BLD-SCNC: 3.7 MMOL/L (ref 3.5–5.3)
PROT SERPL-MCNC: 6.6 G/DL (ref 6–8)
PSV: 8 CMH2O
RBC # BLD AUTO: 4.1 10*6/MM3 (ref 4.2–5.4)
SAO2 % BLDCOA: 90.4 % (ref 90–100)
SAO2 % BLDCOA: 95.9 % (ref 90–100)
SCAN SLIDE: NORMAL
SET MECH RESP RATE: 21
SODIUM BLD-SCNC: 145 MMOL/L (ref 135–153)
VENTILATOR MODE: ABNORMAL
VENTILATOR MODE: ABNORMAL
VT ON VENT VENT: 400 ML
WBC NRBC COR # BLD: 8.9 10*3/MM3 (ref 4.5–12.5)

## 2017-06-06 PROCEDURE — 94799 UNLISTED PULMONARY SVC/PX: CPT

## 2017-06-06 PROCEDURE — 82375 ASSAY CARBOXYHB QUANT: CPT | Performed by: INTERNAL MEDICINE

## 2017-06-06 PROCEDURE — 63710000001 PREDNISONE PER 1 MG: Performed by: INTERNAL MEDICINE

## 2017-06-06 PROCEDURE — 82805 BLOOD GASES W/O2 SATURATION: CPT | Performed by: INTERNAL MEDICINE

## 2017-06-06 PROCEDURE — 86140 C-REACTIVE PROTEIN: CPT | Performed by: INTERNAL MEDICINE

## 2017-06-06 PROCEDURE — 84100 ASSAY OF PHOSPHORUS: CPT | Performed by: INTERNAL MEDICINE

## 2017-06-06 PROCEDURE — 25010000002 FUROSEMIDE PER 20 MG: Performed by: INTERNAL MEDICINE

## 2017-06-06 PROCEDURE — 99291 CRITICAL CARE FIRST HOUR: CPT | Performed by: INTERNAL MEDICINE

## 2017-06-06 PROCEDURE — 25010000002 PROPOFOL 1000 MG/ML EMULSION: Performed by: INTERNAL MEDICINE

## 2017-06-06 PROCEDURE — 25010000002 ENOXAPARIN PER 10 MG: Performed by: INTERNAL MEDICINE

## 2017-06-06 PROCEDURE — 80053 COMPREHEN METABOLIC PANEL: CPT | Performed by: INTERNAL MEDICINE

## 2017-06-06 PROCEDURE — 36600 WITHDRAWAL OF ARTERIAL BLOOD: CPT | Performed by: INTERNAL MEDICINE

## 2017-06-06 PROCEDURE — 83050 HGB METHEMOGLOBIN QUAN: CPT | Performed by: INTERNAL MEDICINE

## 2017-06-06 PROCEDURE — 82962 GLUCOSE BLOOD TEST: CPT

## 2017-06-06 PROCEDURE — 94003 VENT MGMT INPAT SUBQ DAY: CPT

## 2017-06-06 PROCEDURE — 25010000002 CEFEPIME: Performed by: INTERNAL MEDICINE

## 2017-06-06 PROCEDURE — 25010000002 VANCOMYCIN PER 500 MG: Performed by: INTERNAL MEDICINE

## 2017-06-06 PROCEDURE — 71010 HC CHEST AP: CPT

## 2017-06-06 PROCEDURE — 83735 ASSAY OF MAGNESIUM: CPT | Performed by: INTERNAL MEDICINE

## 2017-06-06 PROCEDURE — 25010000002 METOCLOPRAMIDE PER 10 MG: Performed by: INTERNAL MEDICINE

## 2017-06-06 PROCEDURE — 85025 COMPLETE CBC W/AUTO DIFF WBC: CPT | Performed by: INTERNAL MEDICINE

## 2017-06-06 PROCEDURE — 71010 XR CHEST AP: CPT | Performed by: RADIOLOGY

## 2017-06-06 PROCEDURE — 85007 BL SMEAR W/DIFF WBC COUNT: CPT | Performed by: INTERNAL MEDICINE

## 2017-06-06 RX ORDER — ACETYLCYSTEINE 200 MG/ML
600 SOLUTION ORAL; RESPIRATORY (INHALATION) EVERY 12 HOURS SCHEDULED
Status: DISCONTINUED | OUTPATIENT
Start: 2017-06-06 | End: 2017-06-08 | Stop reason: HOSPADM

## 2017-06-06 RX ORDER — FUROSEMIDE 10 MG/ML
40 INJECTION INTRAMUSCULAR; INTRAVENOUS ONCE
Status: COMPLETED | OUTPATIENT
Start: 2017-06-06 | End: 2017-06-06

## 2017-06-06 RX ORDER — POTASSIUM CHLORIDE 1.5 G/1.77G
40 POWDER, FOR SOLUTION ORAL ONCE
Status: COMPLETED | OUTPATIENT
Start: 2017-06-06 | End: 2017-06-06

## 2017-06-06 RX ADMIN — PREDNISONE 20 MG: 20 TABLET ORAL at 09:10

## 2017-06-06 RX ADMIN — ACETYLCYSTEINE 600 MG: 200 SOLUTION ORAL; RESPIRATORY (INHALATION) at 12:47

## 2017-06-06 RX ADMIN — VANCOMYCIN HYDROCHLORIDE 1250 MG: 5 INJECTION, POWDER, LYOPHILIZED, FOR SOLUTION INTRAVENOUS at 17:08

## 2017-06-06 RX ADMIN — ACETYLCYSTEINE 200 MG: 200 SOLUTION ORAL; RESPIRATORY (INHALATION) at 19:10

## 2017-06-06 RX ADMIN — METRONIDAZOLE 500 MG: 500 INJECTION, SOLUTION INTRAVENOUS at 08:02

## 2017-06-06 RX ADMIN — PROPOFOL 25 MCG/KG/MIN: 10 INJECTION, EMULSION INTRAVENOUS at 21:54

## 2017-06-06 RX ADMIN — Medication 10 ML: at 12:19

## 2017-06-06 RX ADMIN — CASTOR OIL AND BALSAM, PERU: 788; 87 OINTMENT TOPICAL at 21:55

## 2017-06-06 RX ADMIN — CEFEPIME 2 G: 2 INJECTION, POWDER, FOR SOLUTION INTRAVENOUS at 08:02

## 2017-06-06 RX ADMIN — POLYETHYLENE GLYCOL 3350 17 G: 17 POWDER, FOR SOLUTION ORAL at 09:10

## 2017-06-06 RX ADMIN — IPRATROPIUM BROMIDE AND ALBUTEROL SULFATE 3 ML: .5; 3 SOLUTION RESPIRATORY (INHALATION) at 00:36

## 2017-06-06 RX ADMIN — POTASSIUM CHLORIDE 40 MEQ: 1.5 POWDER, FOR SOLUTION ORAL at 10:21

## 2017-06-06 RX ADMIN — VANCOMYCIN HYDROCHLORIDE 1250 MG: 5 INJECTION, POWDER, LYOPHILIZED, FOR SOLUTION INTRAVENOUS at 05:02

## 2017-06-06 RX ADMIN — METOCLOPRAMIDE 5 MG: 5 INJECTION, SOLUTION INTRAMUSCULAR; INTRAVENOUS at 08:03

## 2017-06-06 RX ADMIN — Medication 10 ML: at 21:56

## 2017-06-06 RX ADMIN — PROPOFOL 45 MCG/KG/MIN: 10 INJECTION, EMULSION INTRAVENOUS at 02:26

## 2017-06-06 RX ADMIN — IPRATROPIUM BROMIDE AND ALBUTEROL SULFATE 3 ML: .5; 3 SOLUTION RESPIRATORY (INHALATION) at 19:10

## 2017-06-06 RX ADMIN — PROPOFOL 25 MCG/KG/MIN: 10 INJECTION, EMULSION INTRAVENOUS at 06:13

## 2017-06-06 RX ADMIN — METOCLOPRAMIDE 5 MG: 5 INJECTION, SOLUTION INTRAMUSCULAR; INTRAVENOUS at 21:54

## 2017-06-06 RX ADMIN — CASTOR OIL AND BALSAM, PERU: 788; 87 OINTMENT TOPICAL at 12:18

## 2017-06-06 RX ADMIN — FUROSEMIDE 40 MG: 10 INJECTION, SOLUTION INTRAMUSCULAR; INTRAVENOUS at 10:21

## 2017-06-06 RX ADMIN — ATORVASTATIN CALCIUM 20 MG: 20 TABLET, FILM COATED ORAL at 21:54

## 2017-06-06 RX ADMIN — METOCLOPRAMIDE 5 MG: 5 INJECTION, SOLUTION INTRAMUSCULAR; INTRAVENOUS at 16:04

## 2017-06-06 RX ADMIN — ENOXAPARIN SODIUM 40 MG: 40 INJECTION SUBCUTANEOUS at 21:54

## 2017-06-06 RX ADMIN — CEFEPIME 2 G: 2 INJECTION, POWDER, FOR SOLUTION INTRAVENOUS at 16:05

## 2017-06-06 RX ADMIN — IPRATROPIUM BROMIDE AND ALBUTEROL SULFATE 3 ML: .5; 3 SOLUTION RESPIRATORY (INHALATION) at 12:47

## 2017-06-06 RX ADMIN — PROPOFOL 20 MCG/KG/MIN: 10 INJECTION, EMULSION INTRAVENOUS at 15:37

## 2017-06-06 RX ADMIN — PANTOPRAZOLE SODIUM 40 MG: 40 INJECTION, POWDER, FOR SOLUTION INTRAVENOUS at 05:01

## 2017-06-06 RX ADMIN — METRONIDAZOLE 500 MG: 500 INJECTION, SOLUTION INTRAVENOUS at 16:04

## 2017-06-06 RX ADMIN — ASPIRIN 325 MG: 325 TABLET ORAL at 08:02

## 2017-06-06 RX ADMIN — IPRATROPIUM BROMIDE AND ALBUTEROL SULFATE 3 ML: .5; 3 SOLUTION RESPIRATORY (INHALATION) at 06:49

## 2017-06-06 NOTE — PROGRESS NOTES
LOS: 7 days   Patient Care Team:  ERASTO Lee as PCP - General (Family Medicine)    Chief Complaint:  Pulmonology is following for pneumonia and respiratory failure  Subjective     Interval History: patient is intubated and sedated.  Patient had tachybradycardia syndrome overnight.  Cardiology is on the board.      History taken from: chart RN Patient unable to give history due to patient intubated.    Review of Systems:   Review of Systems - History obtained from chart review and unobtainable from patient due to mental status and Intubated      Objective     Vital Signs  Temp:  [98.4 °F (36.9 °C)-99.2 °F (37.3 °C)] 98.4 °F (36.9 °C)  Heart Rate:  [39-99] 69  Resp:  [21-33] 21  BP: (103-185)/(56-83) 121/63  FiO2 (%):  [25 %-30 %] 25 %  Body mass index is 34.71 kg/(m^2).    Intake/Output Summary (Last 24 hours) at 06/06/17 1001  Last data filed at 06/06/17 0802   Gross per 24 hour   Intake             1704 ml   Output             1575 ml   Net              129 ml     I/O this shift:  In: 100 [IV Piggyback:100]  Out: 110 [Urine:110]    Physical Exam:  GENERAL APPEARANCE: Intubated and sedated.  Appears to be resting comfortably in bed.     HEAD: normocephalic.    EYES: PERRLA.    THROAT: ET tube in place. Oral cavity and pharynx normal. No inflammation, swelling, exudate, or lesions.     NECK: Neck supple.     CARDIAC: Normal S1 and S2. No S3, S4 or murmurs. Rhythm is regular.   LUNGS: Clear to auscultation and percussion without rales, rhonchi, wheezing or diminished breath sounds.    ABDOMEN: Positive bowel sounds. Soft, nondistended, nontender.     EXTREMITIES: No significant deformity or joint abnormality. No edema. Peripheral pulses intact.    NEUROLOGICAL: Unable to assess due to sedation status.     PSYCHIATRIC: Unable to assess due to sedation status.     Results Review:     I reviewed the patient's new clinical results.  I reviewed the patient's new imaging results and agree with the  interpretation.    Results from last 7 days  Lab Units 06/06/17  0410 06/05/17  0134 06/04/17  0355   WBC 10*3/mm3 8.90 8.67 7.33   HEMOGLOBIN g/dL 10.7* 11.7* 10.4*   PLATELETS 10*3/mm3 289 272 227       Results from last 7 days  Lab Units 06/06/17  0410 06/05/17  0134 06/04/17  0355   SODIUM mmol/L 145 142 145   POTASSIUM mmol/L 3.7 4.9 4.0   CHLORIDE mmol/L 116* 113* 117*   TOTAL CO2 mmol/L 25.7 24.5 25.0   BUN mg/dL 28* 24* 23*   CREATININE mg/dL 0.50 0.58 0.56   CALCIUM mg/dL 9.3 9.5 9.1   GLUCOSE mg/dL 140* 175* 94   MAGNESIUM mg/dL 2.3 2.4 2.4     Lab Results   Component Value Date    INR 1.02 06/01/2017    INR 1.03 05/31/2017    INR 0.98 05/31/2017    PROTIME 11.3 06/01/2017    PROTIME 11.5 05/31/2017    PROTIME 10.9 05/31/2017       Results from last 7 days  Lab Units 06/06/17  0410 06/05/17  0134 06/04/17  0355   ALK PHOS U/L 54 63 58   BILIRUBIN mg/dL 0.2 0.2 0.2   ALT (SGPT) U/L 18 18 14   AST (SGOT) U/L 25 24 22       Results from last 7 days  Lab Units 06/06/17  0438   PH, ARTERIAL pH units 7.370   PO2 ART mm Hg 86.2   PCO2, ARTERIAL mm Hg 38.1   HCO3 ART mmol/L 21.5*     Imaging Results (last 24 hours)     Procedure Component Value Units Date/Time    XR Chest AP [[id number]] Collected:  06/06/17 0854     Updated:  06/06/17 0937    Narrative:       X-RAY CHEST AP-     CLINICAL INDICATION: Sedation, intubated patient.          COMPARISON: 06/05/2017      TECHNIQUE: Single frontal view of the chest.     FINDINGS:  Bibasilar effusions and right basilar consolidation.  Endotracheal tube overlies the tracheal air column above the nadine.  The cardiac silhouette is normal. The pulmonary vasculature is  unremarkable.  There is no evidence of an acute osseous abnormality.   There are no suspicious-appearing parenchymal soft tissue nodules.            Impression:       Bibasilar effusions and right basilar consolidation.         This report was finalized on 6/6/2017 9:35 AM by Dr. Mauricio Santiago MD.                 Medication Review:   Scheduled Medications:    aspirin 325 mg Oral Daily   atorvastatin 20 mg Oral Nightly   castor oil-balsam peru  Topical Q12H   cefepime 2 g Intravenous Q8H   enoxaparin 40 mg Subcutaneous Nightly   furosemide 40 mg Intravenous Once   ipratropium-albuterol 3 mL Nebulization 4x Daily - RT   metoclopramide 5 mg Intravenous TID   metroNIDAZOLE 500 mg Intravenous Q8H   pantoprazole 40 mg Intravenous Q AM   Pharmacy Meds to Bed Consult  Does not apply Daily   polyethylene glycol 17 g Oral Daily   potassium chloride 40 mEq Oral Once   predniSONE 20 mg Oral Daily With Breakfast   sodium chloride 10 mL Intracatheter Q12H   sodium chloride 10 mL Intracatheter Q12H   vancomycin 1,250 mg Intravenous Q12H     Continuous infusions:    DOPamine 2-20 mcg/kg/min    propofol 5-50 mcg/kg/min Last Rate: Stopped (06/06/17 0906)       Assessment/Plan     Neuro: Continue propofol and fentanyl IV per protocol for sedation.  Will do sedation holiday and spontaneous breathing trial today.      Respiratory: Respiratory failure likely due to pneumonia and history of smoking.      Vent settings:  RR 21 FiO2 30% PEEP 5    Bronchoscopy cultures are negative.    Chest x-ray reviewed this morning.    Started patient on Mucomyst nebs.    Decreased FiO2 to 25%.    We'll place patient on a spontaneous breathing trial with a pressure support of 8.    Continue IV antibiotics, scheduled inhalants and as needed neb treatments.Failed breathing trial yesterday.     ID: Continue IV antibiotics per infectious disease.  Microbiology Results (last 10 days)     Procedure Component Value - Date/Time    Respiratory Culture [[id number]] Collected:  06/02/17 1024    Lab Status:  Final result Specimen:  Lavage from Lung, Right Middle Lobe Updated:  06/05/17 0940     Respiratory Culture No growth     Gram Stain Result No organisms seen    Respiratory Culture [[id number]] Collected:  06/02/17 1024    Lab Status:  Final result Specimen:   Lavage from Lung, Left Lower Lobe Updated:  06/05/17 0941     Respiratory Culture No growth     Gram Stain Result No organisms seen    Blood Culture [[id number]]  (Normal) Collected:  05/31/17 1550    Lab Status:  Final result Specimen:  Blood from Arm, Left Updated:  06/05/17 1701     Blood Culture No growth at 5 days    Blood Culture [[id number]]  (Normal) Collected:  05/31/17 1431    Lab Status:  Final result Specimen:  Blood from Hand, Right Updated:  06/05/17 1601     Blood Culture No growth at 5 days    Blood Culture [[id number]]  (Normal) Collected:  05/30/17 1727    Lab Status:  Final result Specimen:  Blood from Arm, Left Updated:  06/04/17 1801     Blood Culture No growth at 5 days    Blood Culture [[id number]]  (Abnormal) Collected:  05/30/17 1649    Lab Status:  Final result Specimen:  Blood from Arm, Left Updated:  06/05/17 1108     Blood Culture Staphylococcus, coagulase negative (A)      Probable contaminant.        Isolated from --     Gram Stain Result Gram positive cocci in clusters        Cardiac:    Patient is having tachybradycardia syndrome.  This has been happening since last night.  Cardiology is on case, management per them.  We will speak with cardiology to see what their thoughts are on this.       Renal: BUN/Creatinine WNL.  Strict I&Os.   Ordered Lasix 40 mg IV.      Endocrine: monitor glucose targeting 140-180.      Electrolytes:    Monitor levels, manage and replete per protocols.  Potassium is being replaced per protocol.      GI: Patient is tolerating tube feedings well.    Hematology:     Monitor blood counts, transfuse at or below 7 g/dL.  Unless patient is actively bleeding then began transfusing at 8 g/dL.         Patient Active Problem List   Diagnosis Code   • Pneumonia J18.9   • Sepsis due to pneumonia J18.9, A41.9         Bedside rounds were completed with RN and RRT, discussed case and plan in great detail.      ERASTO Mckeon  06/06/17  10:01 AM        Attestation:  Scribed for Dr. Peter, ERASTO Bowden, Abdulkadir Peter M.D. attest that the above note accurately reflects the work and decisions made  by me.  Patient was seen and evaluated by Dr. Peter, including history of present illness, physical exam, assessment, and treatment plan.  The above note was reviewed and edited by Dr. Peter. The patient was  discussed with Miss Sim  and I agree with her history physical assessment and plan.  Critical Care time spent in direct patient care: 33 minutes (excluding procedure time, if applicable) including high complexity decision making to assess, manipulate, and support vital organ system failure in this individual who has impairment of one or more vital organ systems such that there is a high probability of imminent or life threatening deterioration in the patient’s condition.

## 2017-06-06 NOTE — PROGRESS NOTES
[facility] HOSPITALIST PROGRESS NOTE     Patient Identification:  Name:  Mayra Hays  Age:  71 y.o.  Sex:  female  :  1945  MRN:  [id number]  Visit Number:  [id number]  Primary Care Provider:  ERASTO Lee    Length of stay:  7  CCU length of stay:  7    CODE STATUS: Full Code    Chief complaint:  Shortness of air    Subjective:  72 yo admitted last night with shortness of air and respiratory failure from right middle lobe and right lower lobe pneumonia. She was intubated in the ED.  Shortly afterwards, she had elevated troponins and was diagnosed with a NSTEMI. She remains intubated and cannot give me any history at this time. No family is at bedside.  She was on dopamine briefly due to the need for sedation for the vent. Per the nursing staff, she is doing well. She is on low vent settings and no vasopressors at this time.  Heart rate dropped again last night (this happened as well during the first part of her stay) and dopamine was ordered but never started the second time around.  She had bradycardia when she is turned to her right side and this resolves when she is turned to her left side.  Per Sandra in respiratory, the patient tolerated CPAP for about 2 hours yesterday.    Procedures:  2017 intubated and mechanically ventilated  2017 bronchoscopy  2017 PICC right basilic vein  ----------------------------------------------------------------------------------------------------------------------  Current Hospital Meds:  aspirin 325 mg Oral Daily   atorvastatin 20 mg Oral Nightly   castor oil-balsam peru  Topical Q12H   cefepime 2 g Intravenous Q8H   enoxaparin 40 mg Subcutaneous Nightly   ipratropium-albuterol 3 mL Nebulization 4x Daily - RT   metoclopramide 5 mg Intravenous TID   metroNIDAZOLE 500 mg Intravenous Q8H   pantoprazole 40 mg Intravenous Q AM   Pharmacy Meds to Bed Consult  Does not apply Daily   polyethylene glycol 17 g Oral Daily   predniSONE 20 mg Oral  Daily With Breakfast   sodium chloride 10 mL Intracatheter Q12H   sodium chloride 10 mL Intracatheter Q12H   vancomycin 1,250 mg Intravenous Q12H     DOPamine 2-20 mcg/kg/min    propofol 5-50 mcg/kg/min Last Rate: 40 mcg/kg/min (06/06/17 0810)   ----------------------------------------------------------------------------------------------------------------------  Vital Signs:  Temp:  [98.4 °F (36.9 °C)-99.2 °F (37.3 °C)] 98.4 °F (36.9 °C)  Heart Rate:  [39-99] 63  Resp:  [20-33] 21  BP: (103-185)/(56-90) 108/56  FiO2 (%):  [30 %-40 %] 30 %  Last 3 weights    06/04/17  0605 06/05/17  0500 06/06/17  0500   Weight: 230 lb (104 kg) 228 lb (103 kg) 228 lb 4.8 oz (104 kg)     Body mass index is 34.71 kg/(m^2).        Vital High Protein; Tube Feeding Type: Continuous; Continuous Tube Feeding Start Rate (mL/hr): 10; Goal Rate of (mL/hr): 35; Patient is NPO (No Tray)    Vent Mode: VC/AC  FiO2 (%):  [25 %-30 %] 25 %  S RR:  [21] 21  PEEP/CPAP (cm H2O):  [5 cm H20] 5 cm H20  WA SUP:  [8 cm H20] 8 cm H20  MAP (cm H2O):  [10-13] 12    pH pH, Arterial   Date Value Ref Range Status   06/06/2017 7.350 7.350 - 7.450 pH units Final   06/06/2017 7.370 7.350 - 7.450 pH units Final   06/05/2017 7.358 7.350 - 7.450 pH units Final   06/04/2017 7.356 7.350 - 7.450 pH units Final   06/04/2017 7.298 (C) 7.350 - 7.450 pH units Final      pO2 pO2, Arterial   Date Value Ref Range Status   06/06/2017 62.5 (L) 80.0 - 100.0 mm Hg Final   06/06/2017 86.2 80.0 - 100.0 mm Hg Final   06/05/2017 81.6 80.0 - 100.0 mm Hg Final   06/04/2017 95.2 80.0 - 100.0 mm Hg Final   06/04/2017 97.8 80.0 - 100.0 mm Hg Final      pCO2 pCO2, Arterial   Date Value Ref Range Status   06/06/2017 44.4 35.0 - 45.0 mm Hg Final   06/06/2017 38.1 35.0 - 45.0 mm Hg Final   06/05/2017 40.4 35.0 - 45.0 mm Hg Final   06/04/2017 41.9 35.0 - 45.0 mm Hg Final   06/04/2017 45.0 35.0 - 45.0 mm Hg Final      HCO3 HCO3, Arterial   Date Value Ref Range Status   06/06/2017 24.0 22.0 - 26.0  mmol/L Final   06/06/2017 21.5 (L) 22.0 - 26.0 mmol/L Final   06/05/2017 22.2 22.0 - 26.0 mmol/L Final   06/04/2017 22.9 22.0 - 26.0 mmol/L Final   06/04/2017 21.5 (L) 22.0 - 26.0 mmol/L Final      ----------------------------------------------------------------------------------------------------------------------  Physical exam:  Constitutional: Well-developed and well-nourished. Moderate to severe respiratory distress.  Sedated and intubated.   HENT: Head: Normocephalic and atraumatic.  Mouth: Moist mucous membranes.  Oral ETT and oral Gtube.  Eyes: Conjunctivae and EOM are normal. Pupils are equal, round, and reactive to light. No scleral icterus.  Neck: Neck supple. No JVD present.    Cardiovascular: Normal rate, regular rhythm and normal heart sounds with no murmur.  Pulmonary/Chest: Moderate to severe respiratory distress, no wheezes, diffuse rhonchi, no crackles, with normal breath sounds and fair air movement.  Abdominal: Soft. Bowel sounds are normal. No distension and no tenderness.   Musculoskeletal: No edema, no tenderness, and no deformity. No red or swollen joints anywhere.   Neurological: Sedated and on the ventilator.   Skin: Skin is warm and dry. No rash noted. No pallor.   Peripheral vascular: No edema and strong pulses on all 4 extremities.  Genitourinary: Shetty catheter in place, yellow urine in the collection container.  ----------------------------------------------------------------------------------------------------------------------  Tele:  NS 50-60 with possible bundle switching; I have personally reviewed/looked at the telemetry strips.  ----------------------------------------------------------------------------------------------------------------------  Results from last 7 days  Lab Units 06/03/17  0058 06/02/17  1945 06/02/17  1448 06/01/17  0527 05/31/17  1101 05/31/17  0446 05/30/17  2331   CK TOTAL U/L  --   --  22* 33  --  45 51   CKMB ng/mL  --   --  0.31 1.96 2.59 2.76 2.87   CK  MB INDEX %  --   --  1.4 5.9*  --   --  5.6*   TROPONIN I ng/mL 0.117* 0.126* 0.145* 0.220* 0.450* 0.743* 1.117*   MYOGLOBIN ng/mL  --   --   --  47.0 91.0 75.0 98.0     Results from last 7 days  Lab Units 06/06/17 0410 06/05/17 0134 06/04/17 0355 06/01/17  0527 05/31/17  0446 05/31/17  0115  05/30/17  2120 05/30/17  1704 05/30/17  1649   CRP mg/dL 1.64* 5.39* 8.09*  < > 10.96*  --  5.64*  < >  --   --   --    LACTATE mmol/L  --   --   --   --  1.5  --   --   --  2.4* 2.7*  --    WBC 10*3/mm3 8.90 8.67 7.33  < > 12.76*  --  13.99*  --   --   --  18.80*   HEMOGLOBIN g/dL 10.7* 11.7* 10.4*  < > 11.4*  --  12.5  --   --   --  14.4   HEMATOCRIT % 35.5* 37.9 34.9*  < > 36.9*  --  39.7  --   --   --  47.0   MCV fL 86.6 86.5 88.4  < > 87.2  --  85.9  --   --   --  88.5   MCHC g/dL 30.1* 30.9* 29.8*  < > 30.9*  --  31.5*  --   --   --  30.6*   PLATELETS 10*3/mm3 289 272 227  < > 203  --  204  --   --   --  298   INR   --   --   --   --  1.02 1.03 0.98  --   --   --  0.93   < > = values in this interval not displayed.    Results from last 7 days  Lab Units 06/06/17 0410 06/05/17 0134 06/04/17 0355   SODIUM mmol/L 145 142 145   POTASSIUM mmol/L 3.7 4.9 4.0   MAGNESIUM mg/dL 2.3 2.4 2.4   CHLORIDE mmol/L 116* 113* 117*   TOTAL CO2 mmol/L 25.7 24.5 25.0   BUN mg/dL 28* 24* 23*   CREATININE mg/dL 0.50 0.58 0.56   EGFR IF NONAFRICN AM mL/min/1.73 122 103 107   CALCIUM mg/dL 9.3 9.5 9.1   GLUCOSE mg/dL 140* 175* 94   ALBUMIN g/dL 3.10* 3.40 3.10*   BILIRUBIN mg/dL 0.2 0.2 0.2   ALK PHOS U/L 54 63 58   AST (SGOT) U/L 25 24 22   ALT (SGPT) U/L 18 18 14   Estimated Creatinine Clearance: 82.5 mL/min (by C-G formula based on Cr of 0.5).    Results from last 7 days  Lab Units 06/01/17  0527   CHOLESTEROL mg/dL 111   TRIGLYCERIDES mg/dL 136   HDL CHOL mg/dL 34*     Blood Culture   Date Value Ref Range Status   05/31/2017 No growth at 5 days  Final   05/31/2017 No growth at 5 days  Final   05/30/2017 No growth at 5 days  Final    05/30/2017 Staphylococcus, coagulase negative (A)  Final     Comment:     Probable contaminant.   ----------------------------------------------------------------------------------------------------------------------  Imaging Results (last 24 hours)     Procedure Component Value Units Date/Time    XR Chest AP [[id number]] Resulted:  06/06/17 0845     Updated:  06/06/17 0649      I have personally looked at the radiology images and read the final radiology report.  ----------------------------------------------------------------------------------------------------------------------  Assessment and Plan:  -Acute hypoxic and hypercapnic respiratory failure and sepsis due to right middle lobe and right lower lobe pneumonia  -Primary respiratory acidosis and a secondary compensatory metabolic alkalosis, now improved  -Bilateral pleural effusions  -Coagulative negative staphylococcus in one blood culture that could be a contaminant  -Tachybrady syndrome  -Lactic acidosis that has resolved  -Prolonged QTc of 559 ms, down to 511 ms by 6/2/2017   -Sinus bradycardia, now resolved  -Elevated troponins concerning for type 2 myocardial infarction/NSTEMI, but Dr. Awan does not think that she had a NSTEMI  -Type 2 diabetes mellitus (Ha1c 7.3 this admission), newly diagnosed this admission  -Diastolic congestive heart failure with a mild exacerbation due to needed medical care, now resolved  -Mild pulmonary hypertension  -Hyperglycemia, no history of diabetes mellitus  -Acute COPD exacerbation, improved  -Elevated liver enzymes, resolved  -Morbid obesity  -Possible acute renal failure on top of chronic kidney disease Stage 3 (baseline Cr now 0.6 and previously thought to be 0.94-1.02)  -Mild hyperkalemia that has improved  -Past heavy tobacco smoking addiction    Discussed with Dr. Peter at bedside.  Will inform cardiology of the tachybrady syndrome.  Will repeat labs in the morning.  CPAP trials to be performed everyday.   Will obtain ABG and CXR in the morning.  Will continue the cefepime and flagyl and vancomycin.    The patient is high risk due to the following diagnoses/reasons:  Sepsis and acute respiratory failure    Total ICU time today is: 30 minutes    Todd Trammell MD  06/06/17  8:45 AM

## 2017-06-06 NOTE — PROGRESS NOTES
"  I have personally seen and examined the patient today and discussed overnight interval progress and pertinent issues with nursing staff.    Review of Systems-unable to obtain as the patient is sedated and intubated on the ventilator    History taken from: chart RN      Vital Signs    /56  Pulse 63  Temp 98.4 °F (36.9 °C) (Oral)   Resp 21  Ht 68\" (172.7 cm)  Wt 228 lb 4.8 oz (104 kg)  SpO2 92%  BMI 34.71 kg/m2    Temp:  [98.4 °F (36.9 °C)-99.2 °F (37.3 °C)] 98.4 °F (36.9 °C)      Intake/Output Summary (Last 24 hours) at 06/06/17 0837  Last data filed at 06/06/17 0802   Gross per 24 hour   Intake             1784 ml   Output             1750 ml   Net               34 ml     Intake & Output (last 3 days)       06/03 0701 - 06/04 0700 06/04 0701 - 06/05 0700 06/05 0701 - 06/06 0700 06/06 0701 - 06/07 0700    I.V. (mL/kg) 598.8 (5.7) 299.6 (2.9) 227 (2.2)     Other 190 50 130     NG/  935     IV Piggyback 900 1100 1200 100    Total Intake(mL/kg) 1806.8 (17.3) 1449.6 (14) 2492 (24.1) 100 (1)    Urine (mL/kg/hr) 3075 (1.2) 3045 (1.2) 1790 (0.7) 110 (0.7)    Other        Stool 0 (0)  0 (0)     Total Output 3075 3045 1790 110    Net -1268.2 -1595.4 +702 -10            Unmeasured Stool Occurrence 3 x  2 x            Physical Exam:      General Appearance:  Intubated, sedated    Head:  Normocephalic, without obvious abnormality, atraumatic   Eyes:      Lids and lashes normal, conjunctivae and sclerae normal, no icterus, no pallor, corneas clear, PERRLA   Ears:  Ears appear intact with no abnormalities noted   Throat: No oral lesions, no thrush, oral mucosa moist   Neck: No adenopathy, supple, trachea midline, no thyromegaly, no carotid bruit, no JVD   Back:  No tenderness to percussion or palpation, range of motion normal   Lungs:  Wheezing bilaterally on inspiration    Heart:  Regular rhythm and normal rate, normal S1 and S2, no murmur, no gallop, no rub, no click   Chest Wall:  No abnormalities observed "   Abdomen:  Distended, Normal bowel sounds, no masses, no organomegaly, non-tender, no guarding, no rebound tenderness   Rectal:  Deferred   Extremities: Moves all extremities well, no edema, no cyanosis, no redness   Pulses: Pulses palpable and equal bilaterally   Skin: No bleeding, bruising or rash   Lymph nodes: No palpable adenopathy   Neurologic: Sedated            Results:      Results from last 7 days  Lab Units 06/06/17  0410 06/05/17  0134 06/04/17  0355 06/03/17  0058 06/02/17  0315 06/01/17  0527 05/31/17  0115   WBC 10*3/mm3 8.90 8.67 7.33 8.22 11.48 12.76* 13.99*     Lab Results   Component Value Date    NEUTROABS 5.79 06/06/2017         Results from last 7 days  Lab Units 06/06/17  0410   CREATININE mg/dL 0.50         Results from last 7 days  Lab Units 06/06/17  0410 06/05/17  0134 06/04/17  0355   CRP mg/dL 1.64* 5.39* 8.09*       Results Review:    I have personally reviewed laboratory data, culture results, radiology studies and antimicrobial therapy.    Hospital Medications (active)       Dose Frequency Start End    aspirin tablet 325 mg 325 mg Daily 5/31/2017     Sig - Route: Take 1 tablet by mouth Daily. - Oral    atorvastatin (LIPITOR) tablet 20 mg 20 mg Nightly 5/31/2017     Sig - Route: Take 1 tablet by mouth Every Night. - Oral    castor oil-balsam peru (VENELEX) ointment  Every 12 Hours Scheduled 5/31/2017     Sig - Route: Apply  topically Every 12 (Twelve) Hours. - Topical    cefepime (MAXIPIME) 2 g/100 mL 0.9% NS (mbp) 2 g Every 8 Hours Scheduled 5/31/2017     Sig - Route: Infuse 100 mL into a venous catheter Every 8 (Eight) Hours. - Intravenous    DOPamine 400 mg/250 mL (1.6 mg/mL) infusion 5 mcg/kg/min × 107 kg Titrated 5/31/2017     Sig - Route: Infuse 535 mcg/min into a venous catheter Dose Adjusted By Provider As Needed. - Intravenous    enoxaparin (LOVENOX) syringe 40 mg 40 mg Nightly 6/1/2017     Sig - Route: Inject 0.4 mL under the skin Every Night. - Subcutaneous    furosemide  "(LASIX) injection 60 mg 60 mg Once 6/2/2017 6/2/2017    Sig - Route: Infuse 6 mL into a venous catheter 1 (One) Time. - Intravenous    ipratropium-albuterol (DUO-NEB) nebulizer solution 3 mL 3 mL 4 Times Daily - RT 5/31/2017     Sig - Route: Take 3 mL by nebulization 4 (Four) Times a Day. - Nebulization    Magnesium Sulfate 2 gram Bolus, followed by 8 gram infusion (total Mg dose 10 grams)- Mg less than or equal to 1mg/dL 2 g As Needed 5/31/2017     Sig - Route: Infuse 50 mL into a venous catheter As Needed (Mg less than or equal to 1mg/dL). - Intravenous    Linked Group 1:  \"Or\" Linked Group Details        magnesium sulfate 4 gram infusion- Mg 1.6-1.9 mg/dL 4 g As Needed 5/31/2017     Sig - Route: Infuse 100 mL into a venous catheter As Needed (Mg 1.6-1.9 mg/dL). - Intravenous    Linked Group 1:  \"Or\" Linked Group Details        Magnesium Sulfate 6 gram Infusion (2 gm x 3) -Mg 1.1 -1.5 mg/dL 2 g As Needed 5/31/2017     Sig - Route: Infuse 50 mL into a venous catheter As Needed (Mg 1.1 -1.5 mg/dL). - Intravenous    Linked Group 1:  \"Or\" Linked Group Details        metroNIDAZOLE (FLAGYL) IVPB 500 mg 500 mg Every 8 Hours Scheduled 5/31/2017     Sig - Route: Infuse 100 mL into a venous catheter Every 8 (Eight) Hours. - Intravenous    Pharmacy Meds to Bed Consult  Daily 5/31/2017     Sig - Route: Daily. - Does not apply    potassium chloride (KLOR-CON) packet 40 mEq 40 mEq As Needed 5/31/2017     Sig - Route: Take 40 mEq by mouth As Needed (potassium replacement, see admin instructions). - Oral    Linked Group 2:  \"Or\" Linked Group Details        potassium chloride (KLOR-CON) packet 40 mEq 40 mEq Once 6/2/2017 6/2/2017    Sig - Route: Take 40 mEq by mouth 1 (One) Time. - Oral    potassium chloride (MICRO-K) CR capsule 40 mEq 40 mEq As Needed 5/31/2017     Sig - Route: Take 4 capsules by mouth As Needed (potassium replacement.  see admin instructions). - Oral    Linked Group 2:  \"Or\" Linked Group Details        potassium " "chloride 10 mEq in 100 mL IVPB 10 mEq Every 1 Hour PRN 5/31/2017     Sig - Route: Infuse 100 mL into a venous catheter Every 1 (One) Hour As Needed (potassium protocol PERIPHERAL - see admin instructions). - Intravenous    Linked Group 2:  \"Or\" Linked Group Details        propofol (DIPRIVAN) infusion 10 mg/mL 100 mL 5-50 mcg/kg/min × 107 kg Titrated 5/31/2017     Sig - Route: Infuse 535-5,350 mcg/min into a venous catheter Dose Adjusted By Provider As Needed. - Intravenous    Notes to Pharmacy: Order received from Dr Peter to start diprivan per protocol if patient remains bradycardic on precedex IV    sodium chloride (NS) 0.9 % irrigation solution  - ADS Override Pull   6/2/2017 6/2/2017    Notes to Pharmacy: Created by cabinet override    sodium chloride 0.9 % flush 1-10 mL 1-10 mL As Needed 5/30/2017     Sig - Route: Infuse 1-10 mL into a venous catheter As Needed for Line Care. - Intravenous    vancomycin (VANCOCIN) 1,250 mg in sodium chloride 0.9 % 250 mL IVPB 1,250 mg Every 18 Hours 6/1/2017     Sig - Route: Infuse 1,250 mg into a venous catheter Every 18 (Eighteen) Hours. - Intravenous    dexmedetomidine HCl (PRECEDEX) 400 mcg in sodium chloride 0.9 % 100 mL (4 mcg/mL) infusion (Discontinued) 0.2-1.5 mcg/kg/hr × 107 kg Titrated 5/31/2017 6/2/2017    Sig - Route: Infuse 21.4-160.5 mcg/hr into a venous catheter Dose Adjusted By Provider As Needed. - Intravenous    doxycycline (VIBRAMYCIN) 100 mg/100 mL 0.9% NS MBP (Discontinued) 100 mg Every 12 Hours 5/31/2017 6/1/2017    Sig - Route: Infuse 100 mL into a venous catheter Every 12 (Twelve) Hours. - Intravenous    heparin (porcine) 5000 UNIT/ML injection 2,500 Units (Discontinued) 23.4 Units/kg × 107 kg As Needed 5/31/2017 6/1/2017    Sig - Route: Infuse 0.5 mL into a venous catheter As Needed (Per Heparin Nomogram For PTT 45-55). - Intravenous    Reason for Discontinue: Drug interaction    heparin (porcine) 5000 UNIT/ML injection 5,000 Units (Discontinued) 46.7 " Units/kg × 107 kg As Needed 5/31/2017 6/1/2017    Sig - Route: Infuse 1 mL into a venous catheter As Needed (Per Heparin Nomogram For PTT Less Than 45). - Intravenous    Reason for Discontinue: Drug interaction    heparin 87485 units/250 mL (100 units/mL) in 0.45 % NaCl infusion (Discontinued) 9.35 Units/kg/hr × 81.1 kg (Adjusted) Titrated 5/31/2017 6/1/2017    Sig - Route: Infuse 758.285 Units/hr into a venous catheter Dose Adjusted By Provider As Needed. - Intravenous    sodium chloride 0.9 % infusion (Discontinued) 100 mL/hr Continuous 5/30/2017 6/2/2017    Sig - Route: Infuse 100 mL/hr into a venous catheter Continuous. - Intravenous            Cultures:    Blood Culture   Date Value Ref Range Status   05/31/2017 No growth at 24 hours  Preliminary   05/31/2017 No growth at 24 hours  Preliminary   05/30/2017 No growth at 2 days  Preliminary   05/30/2017 Gram positive cocci, consistent with Staph spp (A)  Preliminary       PROBLEM LIST:    Patient Active Problem List   Diagnosis   • Pneumonia   • Sepsis due to pneumonia       Assessment/Plan     ASSESSMENT:    1. Sepsis  2. Staph bacteremia  3. Pneumonia     PLAN:    No issues reported.  Overnight developed bradycardia and hypertension.  No fever reported.  2 loose bowel movements reported.  CRP level has significantly improved.    Blood cultures from 5/30/2017 still showing staph 1 set out of 2 which is most likely a contaminant in the setting of such slow growth.  Otherwise remaining cultures including BAL are showing no growth.  Normal white count and significantly improved CRP level.  We will continue to monitor closely.  Hope to de-escalate soon if the patient continues to show persistent progress.      Patients findings and recommendations were discussed with nursing staff    Code Status: Full Code     Scribed for Dr. Mercer by Rosalia Pinto PA-C.    Rosalia Pinto PA-C  06/06/17  8:37 AM    Physician Attestation:    I have personally seen and  examined the patient. I reviewed the patient's data including history of present illness, review of systems, physical examination, assessment and treatment plan and agree with findings above. The assessment and plan are my own.  I have reviewed and edited the note above after discussing the findings with Rosalia Pinto PA-C.    James Mercer MD  Infectious Diseases  06/06/17  8:37 AM

## 2017-06-07 ENCOUNTER — APPOINTMENT (OUTPATIENT)
Dept: GENERAL RADIOLOGY | Facility: HOSPITAL | Age: 72
End: 2017-06-07

## 2017-06-07 LAB
A-A DO2: 57.3 MMHG (ref 0–300)
ANION GAP SERPL CALCULATED.3IONS-SCNC: 3.4 MMOL/L (ref 3.6–11.2)
ARTERIAL PATENCY WRIST A: POSITIVE
ATMOSPHERIC PRESS: 723 MMHG
BASE EXCESS BLDA CALC-SCNC: -2.6 MMOL/L
BASOPHILS # BLD AUTO: 0.1 10*3/MM3 (ref 0–0.3)
BASOPHILS NFR BLD AUTO: 1.1 % (ref 0–2)
BDY SITE: ABNORMAL
BODY TEMPERATURE: 98.6 C
BUN BLD-MCNC: 25 MG/DL (ref 7–21)
BUN/CREAT SERPL: 46.3 (ref 7–25)
CALCIUM SPEC-SCNC: 9.2 MG/DL (ref 7.7–10)
CHLORIDE SERPL-SCNC: 116 MMOL/L (ref 99–112)
CO2 SERPL-SCNC: 24.6 MMOL/L (ref 24.3–31.9)
COHGB MFR BLD: 1.7 % (ref 0–5)
CREAT BLD-MCNC: 0.54 MG/DL (ref 0.43–1.29)
DEPRECATED RDW RBC AUTO: 42.2 FL (ref 37–54)
EOSINOPHIL # BLD AUTO: 0.45 10*3/MM3 (ref 0–0.7)
EOSINOPHIL NFR BLD AUTO: 4.8 % (ref 0–7)
ERYTHROCYTE [DISTWIDTH] IN BLOOD BY AUTOMATED COUNT: 13.5 % (ref 11.5–14.5)
GFR SERPL CREATININE-BSD FRML MDRD: 112 ML/MIN/1.73
GLUCOSE BLD-MCNC: 118 MG/DL (ref 70–110)
GLUCOSE BLDC GLUCOMTR-MCNC: 123 MG/DL (ref 70–130)
GLUCOSE BLDC GLUCOMTR-MCNC: 129 MG/DL (ref 70–130)
GLUCOSE BLDC GLUCOMTR-MCNC: 151 MG/DL (ref 70–130)
GLUCOSE BLDC GLUCOMTR-MCNC: 187 MG/DL (ref 70–130)
GLUCOSE BLDC GLUCOMTR-MCNC: 96 MG/DL (ref 70–130)
HCO3 BLDA-SCNC: 22.9 MMOL/L (ref 22–26)
HCT VFR BLD AUTO: 37 % (ref 37–47)
HCT VFR BLD CALC: 36 % (ref 37–47)
HGB BLD-MCNC: 11.3 G/DL (ref 12–16)
HGB BLDA-MCNC: 12.1 G/DL (ref 12–16)
HOROWITZ INDEX BLD+IHG-RTO: 25 %
IMM GRANULOCYTES # BLD: 0.38 10*3/MM3 (ref 0–0.03)
IMM GRANULOCYTES NFR BLD: 4 % (ref 0–0.5)
LYMPHOCYTES # BLD AUTO: 2 10*3/MM3 (ref 1–3)
LYMPHOCYTES NFR BLD AUTO: 21.3 % (ref 16–46)
MAGNESIUM SERPL-MCNC: 2.2 MG/DL (ref 1.7–2.6)
MCH RBC QN AUTO: 26.4 PG (ref 27–33)
MCHC RBC AUTO-ENTMCNC: 30.5 G/DL (ref 33–37)
MCV RBC AUTO: 86.4 FL (ref 80–94)
METHGB BLD QL: 0.3 % (ref 0–3)
MODALITY: ABNORMAL
MONOCYTES # BLD AUTO: 1.03 10*3/MM3 (ref 0.1–0.9)
MONOCYTES NFR BLD AUTO: 10.9 % (ref 0–12)
NEUTROPHILS # BLD AUTO: 5.45 10*3/MM3 (ref 1.4–6.5)
NEUTROPHILS NFR BLD AUTO: 57.9 % (ref 40–75)
OSMOLALITY SERPL CALC.SUM OF ELEC: 292.3 MOSM/KG (ref 273–305)
OXYHGB MFR BLDV: 88.5 % (ref 85–100)
PCO2 BLDA: 42 MM HG (ref 35–45)
PEEP RESPIRATORY: 5 CM[H2O]
PH BLDA: 7.35 PH UNITS (ref 7.35–7.45)
PHOSPHATE SERPL-MCNC: 3.5 MG/DL (ref 2.7–4.5)
PLATELET # BLD AUTO: 316 10*3/MM3 (ref 130–400)
PMV BLD AUTO: 11.6 FL (ref 6–10)
PO2 BLDA: 61.8 MM HG (ref 80–100)
POTASSIUM BLD-SCNC: 3.4 MMOL/L (ref 3.5–5.3)
RBC # BLD AUTO: 4.28 10*6/MM3 (ref 4.2–5.4)
SAO2 % BLDCOA: 90.3 % (ref 90–100)
SET MECH RESP RATE: 21
SODIUM BLD-SCNC: 144 MMOL/L (ref 135–153)
VENTILATOR MODE: AC
VT ON VENT VENT: 400 ML
WBC NRBC COR # BLD: 9.41 10*3/MM3 (ref 4.5–12.5)

## 2017-06-07 PROCEDURE — 99291 CRITICAL CARE FIRST HOUR: CPT | Performed by: INTERNAL MEDICINE

## 2017-06-07 PROCEDURE — 84100 ASSAY OF PHOSPHORUS: CPT | Performed by: NURSE PRACTITIONER

## 2017-06-07 PROCEDURE — 99233 SBSQ HOSP IP/OBS HIGH 50: CPT | Performed by: INTERNAL MEDICINE

## 2017-06-07 PROCEDURE — 25010000002 FUROSEMIDE PER 20 MG: Performed by: INTERNAL MEDICINE

## 2017-06-07 PROCEDURE — 25010000002 PROPOFOL 1000 MG/ML EMULSION: Performed by: INTERNAL MEDICINE

## 2017-06-07 PROCEDURE — 94799 UNLISTED PULMONARY SVC/PX: CPT

## 2017-06-07 PROCEDURE — 25010000002 ENOXAPARIN PER 10 MG: Performed by: INTERNAL MEDICINE

## 2017-06-07 PROCEDURE — 82962 GLUCOSE BLOOD TEST: CPT

## 2017-06-07 PROCEDURE — 25010000002 METOCLOPRAMIDE PER 10 MG: Performed by: INTERNAL MEDICINE

## 2017-06-07 PROCEDURE — 25010000002 VANCOMYCIN PER 500 MG: Performed by: INTERNAL MEDICINE

## 2017-06-07 PROCEDURE — 25010000002 CEFEPIME: Performed by: INTERNAL MEDICINE

## 2017-06-07 PROCEDURE — 83050 HGB METHEMOGLOBIN QUAN: CPT | Performed by: NURSE PRACTITIONER

## 2017-06-07 PROCEDURE — 71010 XR CHEST AP: CPT | Performed by: RADIOLOGY

## 2017-06-07 PROCEDURE — 71010 HC CHEST AP: CPT

## 2017-06-07 PROCEDURE — 83735 ASSAY OF MAGNESIUM: CPT | Performed by: NURSE PRACTITIONER

## 2017-06-07 PROCEDURE — 63710000001 PREDNISONE PER 1 MG: Performed by: INTERNAL MEDICINE

## 2017-06-07 PROCEDURE — 80048 BASIC METABOLIC PNL TOTAL CA: CPT | Performed by: NURSE PRACTITIONER

## 2017-06-07 PROCEDURE — 94003 VENT MGMT INPAT SUBQ DAY: CPT

## 2017-06-07 PROCEDURE — 36600 WITHDRAWAL OF ARTERIAL BLOOD: CPT | Performed by: NURSE PRACTITIONER

## 2017-06-07 PROCEDURE — 82805 BLOOD GASES W/O2 SATURATION: CPT | Performed by: NURSE PRACTITIONER

## 2017-06-07 PROCEDURE — 94002 VENT MGMT INPAT INIT DAY: CPT

## 2017-06-07 PROCEDURE — 82375 ASSAY CARBOXYHB QUANT: CPT | Performed by: NURSE PRACTITIONER

## 2017-06-07 PROCEDURE — 85025 COMPLETE CBC W/AUTO DIFF WBC: CPT | Performed by: NURSE PRACTITIONER

## 2017-06-07 RX ORDER — FUROSEMIDE 10 MG/ML
60 INJECTION INTRAMUSCULAR; INTRAVENOUS EVERY 12 HOURS SCHEDULED
Status: DISCONTINUED | OUTPATIENT
Start: 2017-06-07 | End: 2017-06-08 | Stop reason: HOSPADM

## 2017-06-07 RX ORDER — POTASSIUM CHLORIDE 20 MEQ/1
40 TABLET, EXTENDED RELEASE ORAL EVERY 4 HOURS
Status: DISCONTINUED | OUTPATIENT
Start: 2017-06-07 | End: 2017-06-07 | Stop reason: SDUPTHER

## 2017-06-07 RX ORDER — POTASSIUM CHLORIDE 1.5 G/1.77G
40 POWDER, FOR SOLUTION ORAL ONCE
Status: COMPLETED | OUTPATIENT
Start: 2017-06-07 | End: 2017-06-07

## 2017-06-07 RX ORDER — FUROSEMIDE 10 MG/ML
40 INJECTION INTRAMUSCULAR; INTRAVENOUS EVERY 12 HOURS SCHEDULED
Status: DISCONTINUED | OUTPATIENT
Start: 2017-06-07 | End: 2017-06-07

## 2017-06-07 RX ORDER — POTASSIUM CHLORIDE 1.5 G/1.77G
40 POWDER, FOR SOLUTION ORAL EVERY 4 HOURS
Status: COMPLETED | OUTPATIENT
Start: 2017-06-07 | End: 2017-06-07

## 2017-06-07 RX ORDER — PREDNISONE 10 MG/1
10 TABLET ORAL
Status: DISCONTINUED | OUTPATIENT
Start: 2017-06-08 | End: 2017-06-08 | Stop reason: HOSPADM

## 2017-06-07 RX ADMIN — POTASSIUM CHLORIDE 40 MEQ: 1.5 POWDER, FOR SOLUTION ORAL at 12:59

## 2017-06-07 RX ADMIN — CASTOR OIL AND BALSAM, PERU: 788; 87 OINTMENT TOPICAL at 09:52

## 2017-06-07 RX ADMIN — IPRATROPIUM BROMIDE AND ALBUTEROL SULFATE 3 ML: .5; 3 SOLUTION RESPIRATORY (INHALATION) at 06:54

## 2017-06-07 RX ADMIN — Medication 10 ML: at 10:10

## 2017-06-07 RX ADMIN — POLYETHYLENE GLYCOL 3350 17 G: 17 POWDER, FOR SOLUTION ORAL at 08:00

## 2017-06-07 RX ADMIN — PROPOFOL 25 MCG/KG/MIN: 10 INJECTION, EMULSION INTRAVENOUS at 17:33

## 2017-06-07 RX ADMIN — ACETYLCYSTEINE: 200 SOLUTION ORAL; RESPIRATORY (INHALATION) at 06:54

## 2017-06-07 RX ADMIN — METOCLOPRAMIDE 5 MG: 5 INJECTION, SOLUTION INTRAMUSCULAR; INTRAVENOUS at 08:01

## 2017-06-07 RX ADMIN — ASPIRIN 325 MG: 325 TABLET ORAL at 08:00

## 2017-06-07 RX ADMIN — POTASSIUM CHLORIDE 40 MEQ: 1.5 POWDER, FOR SOLUTION ORAL at 09:51

## 2017-06-07 RX ADMIN — PROPOFOL 30 MCG/KG/MIN: 10 INJECTION, EMULSION INTRAVENOUS at 09:51

## 2017-06-07 RX ADMIN — IPRATROPIUM BROMIDE AND ALBUTEROL SULFATE 3 ML: .5; 3 SOLUTION RESPIRATORY (INHALATION) at 18:45

## 2017-06-07 RX ADMIN — IPRATROPIUM BROMIDE AND ALBUTEROL SULFATE 3 ML: .5; 3 SOLUTION RESPIRATORY (INHALATION) at 12:04

## 2017-06-07 RX ADMIN — Medication 10 ML: at 20:06

## 2017-06-07 RX ADMIN — PROPOFOL 30 MCG/KG/MIN: 10 INJECTION, EMULSION INTRAVENOUS at 02:23

## 2017-06-07 RX ADMIN — IPRATROPIUM BROMIDE AND ALBUTEROL SULFATE 3 ML: .5; 3 SOLUTION RESPIRATORY (INHALATION) at 00:20

## 2017-06-07 RX ADMIN — VANCOMYCIN HYDROCHLORIDE 1250 MG: 5 INJECTION, POWDER, LYOPHILIZED, FOR SOLUTION INTRAVENOUS at 05:39

## 2017-06-07 RX ADMIN — METRONIDAZOLE 500 MG: 500 INJECTION, SOLUTION INTRAVENOUS at 15:48

## 2017-06-07 RX ADMIN — PANTOPRAZOLE SODIUM 40 MG: 40 INJECTION, POWDER, FOR SOLUTION INTRAVENOUS at 05:39

## 2017-06-07 RX ADMIN — METOCLOPRAMIDE 5 MG: 5 INJECTION, SOLUTION INTRAMUSCULAR; INTRAVENOUS at 15:48

## 2017-06-07 RX ADMIN — FUROSEMIDE 60 MG: 10 INJECTION, SOLUTION INTRAMUSCULAR; INTRAVENOUS at 10:09

## 2017-06-07 RX ADMIN — ENOXAPARIN SODIUM 40 MG: 40 INJECTION SUBCUTANEOUS at 20:06

## 2017-06-07 RX ADMIN — FUROSEMIDE 60 MG: 10 INJECTION, SOLUTION INTRAMUSCULAR; INTRAVENOUS at 20:05

## 2017-06-07 RX ADMIN — METRONIDAZOLE 500 MG: 500 INJECTION, SOLUTION INTRAVENOUS at 08:00

## 2017-06-07 RX ADMIN — CASTOR OIL AND BALSAM, PERU: 788; 87 OINTMENT TOPICAL at 20:06

## 2017-06-07 RX ADMIN — POTASSIUM CHLORIDE 40 MEQ: 1.5 POWDER, FOR SOLUTION ORAL at 05:39

## 2017-06-07 RX ADMIN — VANCOMYCIN HYDROCHLORIDE 1250 MG: 5 INJECTION, POWDER, LYOPHILIZED, FOR SOLUTION INTRAVENOUS at 17:17

## 2017-06-07 RX ADMIN — Medication 10 ML: at 10:09

## 2017-06-07 RX ADMIN — PREDNISONE 20 MG: 20 TABLET ORAL at 08:17

## 2017-06-07 RX ADMIN — METRONIDAZOLE 500 MG: 500 INJECTION, SOLUTION INTRAVENOUS at 23:05

## 2017-06-07 RX ADMIN — ATORVASTATIN CALCIUM 20 MG: 20 TABLET, FILM COATED ORAL at 20:05

## 2017-06-07 RX ADMIN — CEFEPIME 2 G: 2 INJECTION, POWDER, FOR SOLUTION INTRAVENOUS at 00:18

## 2017-06-07 RX ADMIN — METOCLOPRAMIDE 5 MG: 5 INJECTION, SOLUTION INTRAMUSCULAR; INTRAVENOUS at 20:05

## 2017-06-07 RX ADMIN — ACETYLCYSTEINE 800 MG: 200 SOLUTION ORAL; RESPIRATORY (INHALATION) at 18:45

## 2017-06-07 RX ADMIN — CEFEPIME 2 G: 2 INJECTION, POWDER, FOR SOLUTION INTRAVENOUS at 08:00

## 2017-06-07 RX ADMIN — PROPOFOL 25 MCG/KG/MIN: 10 INJECTION, EMULSION INTRAVENOUS at 22:33

## 2017-06-07 RX ADMIN — METRONIDAZOLE 500 MG: 500 INJECTION, SOLUTION INTRAVENOUS at 00:19

## 2017-06-07 NOTE — PLAN OF CARE
Problem: Mechanical Ventilation, Invasive (Adult)  Goal: Signs and Symptoms of Listed Potential Problems Will be Absent or Manageable (Mechanical Ventilation, Invasive)  Outcome: Ongoing (interventions implemented as appropriate)    Problem: Sepsis (Adult)  Goal: Signs and Symptoms of Listed Potential Problems Will be Absent or Manageable (Sepsis)  Outcome: Ongoing (interventions implemented as appropriate)    Problem: Skin Integrity Impairment, Risk/Actual (Adult)  Goal: Identify Related Risk Factors and Signs and Symptoms  Outcome: Ongoing (interventions implemented as appropriate)  Goal: Skin Integrity/Wound Healing  Outcome: Ongoing (interventions implemented as appropriate)    Problem: Pneumonia (Adult)  Goal: Signs and Symptoms of Listed Potential Problems Will be Absent or Manageable (Pneumonia)  Outcome: Ongoing (interventions implemented as appropriate)    Problem: Fall Risk (Adult)  Goal: Identify Related Risk Factors and Signs and Symptoms  Outcome: Ongoing (interventions implemented as appropriate)  Goal: Absence of Falls  Outcome: Ongoing (interventions implemented as appropriate)    Problem: Pressure Ulcer (Adult)  Goal: Signs and Symptoms of Listed Potential Problems Will be Absent or Manageable (Pressure Ulcer)  Outcome: Ongoing (interventions implemented as appropriate)

## 2017-06-07 NOTE — PLAN OF CARE
Problem: Mechanical Ventilation, Invasive (Adult)  Goal: Signs and Symptoms of Listed Potential Problems Will be Absent or Manageable (Mechanical Ventilation, Invasive)  Outcome: Ongoing (interventions implemented as appropriate)    06/06/17 2030 06/07/17 1857   Mechanical Ventilation, Invasive   Problems Assessed (Mechanical Ventilation, Invasive) --  all   Problems Present (Mechanical Ventilation, Invasive) immobility;inability to wean;situational response --          Problem: Sepsis (Adult)  Goal: Signs and Symptoms of Listed Potential Problems Will be Absent or Manageable (Sepsis)  Outcome: Ongoing (interventions implemented as appropriate)    06/06/17 2030   Sepsis   Problems Assessed (Sepsis) all   Problems Present (Sepsis) hypoxia/hypoxemia         Problem: Skin Integrity Impairment, Risk/Actual (Adult)  Goal: Identify Related Risk Factors and Signs and Symptoms  Outcome: Ongoing (interventions implemented as appropriate)    06/06/17 2030   Skin Integrity Impairment, Risk/Actual   Skin Integrity Impairment, Risk/Actual: Related Risk Factors age extremes;edema;mechanical factors;infection/disease process;immobility;fluid/nutrition status;medication effects;metabolic imbalance;tissue perfusion impaired   Signs and Symptoms (Skin Integrity Impairment) erythema nonblanchable;edema       Goal: Skin Integrity/Wound Healing  Outcome: Ongoing (interventions implemented as appropriate)    06/06/17 2030   Skin Integrity Impairment, Risk/Actual (Adult)   Skin Integrity/Wound Healing making progress toward outcome         Problem: Pneumonia (Adult)  Goal: Signs and Symptoms of Listed Potential Problems Will be Absent or Manageable (Pneumonia)  Outcome: Ongoing (interventions implemented as appropriate)    06/06/17 2030   Pneumonia   Problems Assessed (Pneumonia) all   Problems Present (Pneumonia) respiratory compromise         Problem: Patient Care Overview (Adult)  Goal: Plan of Care Review  Outcome: Ongoing  (interventions implemented as appropriate)    06/04/17 2024 06/07/17 0800   Coping/Psychosocial Response Interventions   Plan Of Care Reviewed With --  patient;daughter   Patient Care Overview   Progress progress toward functional goals is gradual --        Goal: Adult Individualization and Mutuality  Outcome: Ongoing (interventions implemented as appropriate)  Goal: Discharge Needs Assessment  Outcome: Ongoing (interventions implemented as appropriate)    05/31/17 1403 06/04/17 1006 06/06/17 0132   Discharge Needs Assessment   Concerns To Be Addressed --  --  no discharge needs identified   Readmission Within The Last 30 Days no previous admission in last 30 days --  --    Equipment Needed After Discharge none --  --    Discharge Disposition --  still a patient --    Current Health   Outpatient/Agency/Support Group Needs (Pt does not utilize home health services at this time. ) --  --    Anticipated Changes Related to Illness none --  --    Living Environment   Transportation Available car;family or friend will provide --  --    Self-Care   Equipment Currently Used at Home oxygen  ([employer]) --  --          Problem: Fall Risk (Adult)  Goal: Identify Related Risk Factors and Signs and Symptoms  Outcome: Ongoing (interventions implemented as appropriate)    06/06/17 2030   Fall Risk   Fall Risk: Related Risk Factors age-related changes;bladder function altered;confusion/agitation;culprit medication(s);homeostatic imbalance;polypharmacy;sleep pattern alteration;environment unfamiliar   Fall Risk: Signs and Symptoms presence of risk factors       Goal: Absence of Falls  Outcome: Ongoing (interventions implemented as appropriate)    06/06/17 2030   Fall Risk (Adult)   Absence of Falls making progress toward outcome         Problem: Pressure Ulcer (Adult)  Goal: Signs and Symptoms of Listed Potential Problems Will be Absent or Manageable (Pressure Ulcer)  Outcome: Ongoing (interventions implemented as appropriate)     06/06/17 2030   Pressure Ulcer   Problems Assessed (Pressure Ulcer) all   Problems Present (Pressure Ulcer) none

## 2017-06-07 NOTE — PROGRESS NOTES
"  I have personally seen and examined the patient today and discussed overnight interval progress and pertinent issues with nursing staff.    Review of Systems-unable to obtain as the patient is sedated and intubated on the ventilator    History taken from: chart RN      Vital Signs    /86  Pulse 79  Temp 99.1 °F (37.3 °C) (Oral)   Resp 23  Ht 68\" (172.7 cm)  Wt 227 lb (103 kg)  SpO2 95%  BMI 34.52 kg/m2    Temp:  [98.7 °F (37.1 °C)-99.5 °F (37.5 °C)] 99.1 °F (37.3 °C)      Intake/Output Summary (Last 24 hours) at 06/07/17 0848  Last data filed at 06/07/17 0800   Gross per 24 hour   Intake           2629.4 ml   Output             3185 ml   Net           -555.6 ml     Intake & Output (last 3 days)       06/04 0701 - 06/05 0700 06/05 0701 - 06/06 0700 06/06 0701 - 06/07 0700 06/07 0701 - 06/08 0700    I.V. (mL/kg) 299.6 (2.9) 227 (2.2) 614.4 (6)     Other 50 130 280     NG/GT  935 635     IV Piggyback 1100 1200 1100 100    Total Intake(mL/kg) 1449.6 (14) 2492 (24.1) 2629.4 (25.5) 100 (1)    Urine (mL/kg/hr) 3045 (1.2) 1790 (0.7) 3140 (1.3) 155 (0.8)    Stool  0 (0) 0 (0)     Total Output 3045 1790 3140 155    Net -1595.4 +702 -510.6 -55            Unmeasured Stool Occurrence  2 x 1 x            Physical Exam:      General Appearance:  Intubated, sedated    Head:  Normocephalic, without obvious abnormality, atraumatic   Eyes:      Lids and lashes normal, conjunctivae and sclerae normal, no icterus, no pallor, corneas clear, PERRLA   Ears:  Ears appear intact with no abnormalities noted   Throat: No oral lesions, no thrush, oral mucosa moist   Neck: No adenopathy, supple, trachea midline, no thyromegaly, no carotid bruit, no JVD   Back:  No tenderness to percussion or palpation, range of motion normal   Lungs:  Wheezing bilaterally on inspiration    Heart:  Regular rhythm and normal rate, normal S1 and S2, no murmur, no gallop, no rub, no click   Chest Wall:  No abnormalities observed   Abdomen:  Distended, " Normal bowel sounds, no masses, no organomegaly, non-tender, no guarding, no rebound tenderness   Rectal:  Deferred   Extremities: Moves all extremities well, no edema, no cyanosis, no redness   Pulses: Pulses palpable and equal bilaterally   Skin: No bleeding, bruising or rash   Lymph nodes: No palpable adenopathy   Neurologic: Sedated            Results:      Results from last 7 days  Lab Units 06/07/17  0100 06/06/17  0410 06/05/17  0134 06/04/17  0355 06/03/17  0058 06/02/17  0315 06/01/17  0527   WBC 10*3/mm3 9.41 8.90 8.67 7.33 8.22 11.48 12.76*     Lab Results   Component Value Date    NEUTROABS 5.45 06/07/2017         Results from last 7 days  Lab Units 06/07/17  0100   CREATININE mg/dL 0.54         Results from last 7 days  Lab Units 06/06/17  0410 06/05/17  0134 06/04/17  0355   CRP mg/dL 1.64* 5.39* 8.09*       Results Review:    I have personally reviewed laboratory data, culture results, radiology studies and antimicrobial therapy.    Hospital Medications (active)       Dose Frequency Start End    aspirin tablet 325 mg 325 mg Daily 5/31/2017     Sig - Route: Take 1 tablet by mouth Daily. - Oral    atorvastatin (LIPITOR) tablet 20 mg 20 mg Nightly 5/31/2017     Sig - Route: Take 1 tablet by mouth Every Night. - Oral    castor oil-balsam peru (VENELEX) ointment  Every 12 Hours Scheduled 5/31/2017     Sig - Route: Apply  topically Every 12 (Twelve) Hours. - Topical    cefepime (MAXIPIME) 2 g/100 mL 0.9% NS (mbp) 2 g Every 8 Hours Scheduled 5/31/2017     Sig - Route: Infuse 100 mL into a venous catheter Every 8 (Eight) Hours. - Intravenous    DOPamine 400 mg/250 mL (1.6 mg/mL) infusion 5 mcg/kg/min × 107 kg Titrated 5/31/2017     Sig - Route: Infuse 535 mcg/min into a venous catheter Dose Adjusted By Provider As Needed. - Intravenous    enoxaparin (LOVENOX) syringe 40 mg 40 mg Nightly 6/1/2017     Sig - Route: Inject 0.4 mL under the skin Every Night. - Subcutaneous    furosemide (LASIX) injection 60 mg 60  "mg Once 6/2/2017 6/2/2017    Sig - Route: Infuse 6 mL into a venous catheter 1 (One) Time. - Intravenous    ipratropium-albuterol (DUO-NEB) nebulizer solution 3 mL 3 mL 4 Times Daily - RT 5/31/2017     Sig - Route: Take 3 mL by nebulization 4 (Four) Times a Day. - Nebulization    Magnesium Sulfate 2 gram Bolus, followed by 8 gram infusion (total Mg dose 10 grams)- Mg less than or equal to 1mg/dL 2 g As Needed 5/31/2017     Sig - Route: Infuse 50 mL into a venous catheter As Needed (Mg less than or equal to 1mg/dL). - Intravenous    Linked Group 1:  \"Or\" Linked Group Details        magnesium sulfate 4 gram infusion- Mg 1.6-1.9 mg/dL 4 g As Needed 5/31/2017     Sig - Route: Infuse 100 mL into a venous catheter As Needed (Mg 1.6-1.9 mg/dL). - Intravenous    Linked Group 1:  \"Or\" Linked Group Details        Magnesium Sulfate 6 gram Infusion (2 gm x 3) -Mg 1.1 -1.5 mg/dL 2 g As Needed 5/31/2017     Sig - Route: Infuse 50 mL into a venous catheter As Needed (Mg 1.1 -1.5 mg/dL). - Intravenous    Linked Group 1:  \"Or\" Linked Group Details        metroNIDAZOLE (FLAGYL) IVPB 500 mg 500 mg Every 8 Hours Scheduled 5/31/2017     Sig - Route: Infuse 100 mL into a venous catheter Every 8 (Eight) Hours. - Intravenous    Pharmacy Meds to Bed Consult  Daily 5/31/2017     Sig - Route: Daily. - Does not apply    potassium chloride (KLOR-CON) packet 40 mEq 40 mEq As Needed 5/31/2017     Sig - Route: Take 40 mEq by mouth As Needed (potassium replacement, see admin instructions). - Oral    Linked Group 2:  \"Or\" Linked Group Details        potassium chloride (KLOR-CON) packet 40 mEq 40 mEq Once 6/2/2017 6/2/2017    Sig - Route: Take 40 mEq by mouth 1 (One) Time. - Oral    potassium chloride (MICRO-K) CR capsule 40 mEq 40 mEq As Needed 5/31/2017     Sig - Route: Take 4 capsules by mouth As Needed (potassium replacement.  see admin instructions). - Oral    Linked Group 2:  \"Or\" Linked Group Details        potassium chloride 10 mEq in 100 mL " "IVPB 10 mEq Every 1 Hour PRN 5/31/2017     Sig - Route: Infuse 100 mL into a venous catheter Every 1 (One) Hour As Needed (potassium protocol PERIPHERAL - see admin instructions). - Intravenous    Linked Group 2:  \"Or\" Linked Group Details        propofol (DIPRIVAN) infusion 10 mg/mL 100 mL 5-50 mcg/kg/min × 107 kg Titrated 5/31/2017     Sig - Route: Infuse 535-5,350 mcg/min into a venous catheter Dose Adjusted By Provider As Needed. - Intravenous    Notes to Pharmacy: Order received from Dr Peter to start diprivan per protocol if patient remains bradycardic on precedex IV    sodium chloride (NS) 0.9 % irrigation solution  - ADS Override Pull   6/2/2017 6/2/2017    Notes to Pharmacy: Created by cabinet override    sodium chloride 0.9 % flush 1-10 mL 1-10 mL As Needed 5/30/2017     Sig - Route: Infuse 1-10 mL into a venous catheter As Needed for Line Care. - Intravenous    vancomycin (VANCOCIN) 1,250 mg in sodium chloride 0.9 % 250 mL IVPB 1,250 mg Every 18 Hours 6/1/2017     Sig - Route: Infuse 1,250 mg into a venous catheter Every 18 (Eighteen) Hours. - Intravenous    dexmedetomidine HCl (PRECEDEX) 400 mcg in sodium chloride 0.9 % 100 mL (4 mcg/mL) infusion (Discontinued) 0.2-1.5 mcg/kg/hr × 107 kg Titrated 5/31/2017 6/2/2017    Sig - Route: Infuse 21.4-160.5 mcg/hr into a venous catheter Dose Adjusted By Provider As Needed. - Intravenous    doxycycline (VIBRAMYCIN) 100 mg/100 mL 0.9% NS MBP (Discontinued) 100 mg Every 12 Hours 5/31/2017 6/1/2017    Sig - Route: Infuse 100 mL into a venous catheter Every 12 (Twelve) Hours. - Intravenous    heparin (porcine) 5000 UNIT/ML injection 2,500 Units (Discontinued) 23.4 Units/kg × 107 kg As Needed 5/31/2017 6/1/2017    Sig - Route: Infuse 0.5 mL into a venous catheter As Needed (Per Heparin Nomogram For PTT 45-55). - Intravenous    Reason for Discontinue: Drug interaction    heparin (porcine) 5000 UNIT/ML injection 5,000 Units (Discontinued) 46.7 Units/kg × 107 kg As " Needed 5/31/2017 6/1/2017    Sig - Route: Infuse 1 mL into a venous catheter As Needed (Per Heparin Nomogram For PTT Less Than 45). - Intravenous    Reason for Discontinue: Drug interaction    heparin 13621 units/250 mL (100 units/mL) in 0.45 % NaCl infusion (Discontinued) 9.35 Units/kg/hr × 81.1 kg (Adjusted) Titrated 5/31/2017 6/1/2017    Sig - Route: Infuse 758.285 Units/hr into a venous catheter Dose Adjusted By Provider As Needed. - Intravenous    sodium chloride 0.9 % infusion (Discontinued) 100 mL/hr Continuous 5/30/2017 6/2/2017    Sig - Route: Infuse 100 mL/hr into a venous catheter Continuous. - Intravenous            Cultures:    Blood Culture   Date Value Ref Range Status   05/31/2017 No growth at 24 hours  Preliminary   05/31/2017 No growth at 24 hours  Preliminary   05/30/2017 No growth at 2 days  Preliminary   05/30/2017 Gram positive cocci, consistent with Staph spp (A)  Preliminary       PROBLEM LIST:    Patient Active Problem List   Diagnosis   • Pneumonia   • Sepsis due to pneumonia       Assessment/Plan     ASSESSMENT:    1. Sepsis  2. Staph bacteremia  3. Pneumonia     PLAN:    Low-grade fever of 99.5 with one loose bowel movement overnight.  Off pressors but still on the ventilator.  Daughter bedside.  Normal white count.  Overall patient has shown improvement from infectious disease standpoint.    Blood cultures from 5/30/2017 still showing staph 1 set out of 2 which is most likely a contaminant in the setting of such slow growth.  Otherwise remaining cultures including BAL are showing no growth.  We will continue to monitor closely.  Hope to de-escalate soon if the patient continues to show persistent progress.      Patients findings and recommendations were discussed with nursing staff    Code Status: Full Code     Scribed for Dr. Mercer by Rosalia Pinto PA-C.    Rosalia Pinto PA-C  06/07/17  8:48 AM    Physician Attestation:    I have personally seen and examined the patient. I  reviewed the patient's data including history of present illness, review of systems, physical examination, assessment and treatment plan and agree with findings above. The assessment and plan are my own.  I have reviewed and edited the note above after discussing the findings with Rosalia Pinto PA-C.    James Mercer MD  Infectious Diseases  06/07/17  8:48 AM

## 2017-06-07 NOTE — PROGRESS NOTES
Discharge Planning Assessment   Pasqu[place]     Patient Name: Mayra Hays  MRN: [id number]  Today's Date: 6/7/2017    Admit Date: 5/30/2017       Discharge Plan       06/07/17 1504    Case Management/Social Work Plan    Plan SS received consult for [facility].  SS spoke with the pt's daughter, Hailey regarding consult and she is agreeable for the pt to be transferred to [facility] if the pt meets criteria.  SS contacted [facility] and she is agreeable to evaluate the pt for placement.  SS faxed a referral to [phone].  SS will follow.     Patient/Family In Agreement With Plan yes        Expected Discharge Date and Time     Expected Discharge Date Expected Discharge Time    Jun 6, 2017                Leah Olivarez

## 2017-06-07 NOTE — PROGRESS NOTES
LOS: 8 days   Patient Care Team:  ERASTO Lee as PCP - General (Family Medicine)    Chief Complaint:  Pulmonology is following for pneumonia and respiratory failure    Subjective     Interval History: patient is intubated and sedated.  No acute events reported overnight.      History taken from: chart RN Patient unable to give history due to patient intubated.    Review of Systems:   Review of Systems - History obtained from chart review and unobtainable from patient due to mental status and Intubated      Objective     Vital Signs  Temp:  [98.7 °F (37.1 °C)-99.5 °F (37.5 °C)] 99.1 °F (37.3 °C)  Heart Rate:  [] 79  Resp:  [21-28] 23  BP: ()/(46-96) 143/72  FiO2 (%):  [21 %-25 %] 25 %  Body mass index is 34.52 kg/(m^2).    Intake/Output Summary (Last 24 hours) at 06/07/17 1043  Last data filed at 06/07/17 0956   Gross per 24 hour   Intake           2629.4 ml   Output             3205 ml   Net           -575.6 ml     I/O this shift:  In: 100 [IV Piggyback:100]  Out: 290 [Urine:290]    Physical Exam:  GENERAL APPEARANCE: Intubated and sedated.  Appears to be resting comfortably in bed.     HEAD: normocephalic.    EYES: PERRLA.    THROAT: ET tube in place. Oral cavity and pharynx normal. No inflammation, swelling, exudate, or lesions.     NECK: Neck supple.     CARDIAC: Normal S1 and S2. No S3, S4 or murmurs. Rhythm is regular.     LUNGS: Clear to auscultation and percussion without rales, rhonchi, wheezing or diminished breath sounds.    ABDOMEN: Positive bowel sounds. Soft, nondistended, nontender.     EXTREMITIES: No significant deformity or joint abnormality. No edema. Peripheral pulses intact.    NEUROLOGICAL: Unable to assess due to sedation status.     PSYCHIATRIC: Unable to assess due to sedation status.     Results Review:     I reviewed the patient's new clinical results.  I reviewed the patient's new imaging results and agree with the interpretation.    Results from last 7 days  Lab Units  06/07/17  0100 06/06/17  0410 06/05/17  0134   WBC 10*3/mm3 9.41 8.90 8.67   HEMOGLOBIN g/dL 11.3* 10.7* 11.7*   PLATELETS 10*3/mm3 316 289 272       Results from last 7 days  Lab Units 06/07/17  0100 06/06/17  0410 06/05/17  0134   SODIUM mmol/L 144 145 142   POTASSIUM mmol/L 3.4* 3.7 4.9   CHLORIDE mmol/L 116* 116* 113*   TOTAL CO2 mmol/L 24.6 25.7 24.5   BUN mg/dL 25* 28* 24*   CREATININE mg/dL 0.54 0.50 0.58   CALCIUM mg/dL 9.2 9.3 9.5   GLUCOSE mg/dL 118* 140* 175*   MAGNESIUM mg/dL 2.2 2.3 2.4     Lab Results   Component Value Date    INR 1.02 06/01/2017    INR 1.03 05/31/2017    INR 0.98 05/31/2017    PROTIME 11.3 06/01/2017    PROTIME 11.5 05/31/2017    PROTIME 10.9 05/31/2017       Results from last 7 days  Lab Units 06/06/17  0410 06/05/17  0134 06/04/17  0355   ALK PHOS U/L 54 63 58   BILIRUBIN mg/dL 0.2 0.2 0.2   ALT (SGPT) U/L 18 18 14   AST (SGOT) U/L 25 24 22       Results from last 7 days  Lab Units 06/07/17  0439   PH, ARTERIAL pH units 7.354   PO2 ART mm Hg 61.8*   PCO2, ARTERIAL mm Hg 42.0   HCO3 ART mmol/L 22.9     Imaging Results (last 24 hours)     Procedure Component Value Units Date/Time    XR Chest AP [[id number]] Collected:  06/07/17 0941     Updated:  06/07/17 0947    Narrative:       XR CHEST AP-     CLINICAL INDICATION: Sedation in intubated patients.          COMPARISON: 06/06/2017.      TECHNIQUE: Single frontal view of the chest.     FINDINGS:     Slight improvement in aeration.  Mild pulmonary congestion.  There is no evidence of an acute osseous abnormality.   There are no suspicious-appearing parenchymal soft tissue nodules.            Impression:       Slight interval improvement.         This report was finalized on 6/7/2017 9:45 AM by Dr. Mauricio Santiago MD.                Medication Review:   Scheduled Medications:    acetylcysteine 600 mg Inhalation Q12H   aspirin 325 mg Oral Daily   atorvastatin 20 mg Oral Nightly   castor oil-balsam peru  Topical Q12H   enoxaparin 40 mg  Subcutaneous Nightly   furosemide 60 mg Intravenous Q12H   ipratropium-albuterol 3 mL Nebulization 4x Daily - RT   metoclopramide 5 mg Intravenous TID   metroNIDAZOLE 500 mg Intravenous Q8H   pantoprazole 40 mg Intravenous Q AM   Pharmacy Meds to Bed Consult  Does not apply Daily   polyethylene glycol 17 g Oral Daily   potassium chloride 40 mEq Oral Once   [START ON 6/8/2017] predniSONE 10 mg Oral Daily With Breakfast   sodium chloride 10 mL Intracatheter Q12H   sodium chloride 10 mL Intracatheter Q12H   vancomycin 1,250 mg Intravenous Q12H     Continuous infusions:    propofol 5-50 mcg/kg/min Last Rate: 25 mcg/kg/min (06/07/17 1012)       Assessment/Plan     Neuro:Continue propofol and fentanyl IV per protocol for sedation.  Sedation vacation and breathing trial will be done today.    Respiratory: Respiratory failure likely due to pneumonia and history of smoking.      Vent settings:  RR 21 FiO2 25% PEEP 5     Patient was placed on a pressure support of 8 for spontaneous breathing trial.    She failed breathing trial after becoming tachypneic, and hypoxic.    She was placed back on current vent settings.    Decreased prednisone to 10 mg daily.    Continue IV antibiotics, scheduled inhalants and PRN neb treatments.      ID: Continue IV antibiotics per infectious disease.  Discontinued cefepime. Cultures are negative.  Microbiology Results (last 10 days)     Procedure Component Value - Date/Time    Respiratory Culture [[id number]] Collected:  06/02/17 1024    Lab Status:  Final result Specimen:  Lavage from Lung, Right Middle Lobe Updated:  06/05/17 0940     Respiratory Culture No growth     Gram Stain Result No organisms seen    Respiratory Culture [[id number]] Collected:  06/02/17 1024    Lab Status:  Final result Specimen:  Lavage from Lung, Left Lower Lobe Updated:  06/05/17 0941     Respiratory Culture No growth     Gram Stain Result No organisms seen    Blood Culture [[id number]]  (Normal) Collected:   05/31/17 1550    Lab Status:  Final result Specimen:  Blood from Arm, Left Updated:  06/05/17 1701     Blood Culture No growth at 5 days    Blood Culture [[id number]]  (Normal) Collected:  05/31/17 1431    Lab Status:  Final result Specimen:  Blood from Hand, Right Updated:  06/05/17 1601     Blood Culture No growth at 5 days    Blood Culture [[id number]]  (Normal) Collected:  05/30/17 1727    Lab Status:  Final result Specimen:  Blood from Arm, Left Updated:  06/04/17 1801     Blood Culture No growth at 5 days    Blood Culture [[id number]]  (Abnormal) Collected:  05/30/17 1649    Lab Status:  Final result Specimen:  Blood from Arm, Left Updated:  06/05/17 1108     Blood Culture Staphylococcus, coagulase negative (A)      Probable contaminant.        Isolated from --     Gram Stain Result Gram positive cocci in clusters          Cardiac:    Hemodynamically Stable.  Continuous ECG and pulse ox monitoring.        Renal:BUN/Creatinine WNL.  Strict I&Os.   Ordered lasix 40 mg IV BID and potassium 40 mEq PO.      Endocrine: monitor glucose targeting 140-180.      Electrolytes:    Monitor levels, manage and replete per protocols.      GI: tube feedings are currently on hold for breathing trial.     Hematology:    Monitor blood counts, transfuse at or below 7 g/dL.  Unless patient is actively bleeding then began transfusing at 8 g/dL.       Patient Active Problem List   Diagnosis Code   • Pneumonia J18.9   • Sepsis due to pneumonia J18.9, A41.9         Bedside rounds were completed with RN and RRT, discussed case and plan in great detail.    Spoke with family member this morning- Daughter .  Discussed the case in detail- explained all the labs results and images. Discussed plan for the upcoming days and answered questions to their satisfaction.    ERASTO Mckeon  06/07/17  10:43 AM        Attestation: Scribed for Kath Motley, ERASTO    I, Abdulkadir Peter M.D. attest that the above note accurately  reflects the work and decisions made  by me.  Patient was seen and evaluated by Dr. Peter, including history of present illness, physical exam, assessment, and treatment plan.  The above note was reviewed and edited by Dr. Peter. The patient was  discussed with Miss Sim  and I agree with her history physical assessment and plan.  Critical Care time spent in direct patient care: 35 minutes (excluding procedure time, if applicable) including high complexity decision making to assess, manipulate, and support vital organ system failure in this individual who has impairment of one or more vital organ systems such that there is a high probability of imminent or life threatening deterioration in the patient’s condition.

## 2017-06-07 NOTE — PLAN OF CARE
Problem: Mechanical Ventilation, Invasive (Adult)  Goal: Signs and Symptoms of Listed Potential Problems Will be Absent or Manageable (Mechanical Ventilation, Invasive)  Outcome: Ongoing (interventions implemented as appropriate)    06/07/17 3737   Mechanical Ventilation, Invasive   Problems Assessed (Mechanical Ventilation, Invasive) all

## 2017-06-07 NOTE — DISCHARGE PLACEMENT REQUEST
"Charles Archer (71 y.o. Female)     Date of Birth Social Security Number Address [address] Phone MRN    1945  [address]  [place] [id number] [phone] [id number]    Tenriism Marital Status          None Other       Admission Date Admission Type Admitting Provider Attending Provider Department, Room/Bed    5/30/17 Emergency Todd Trammell MD Perkins, Jimmye S, MD [facility], CC09/1C    Discharge Date Discharge Disposition Discharge Destination                      Attending Provider: Todd Trammell MD     Allergies:  No Known Allergies    Isolation:  None   Infection:  None   Code Status:  FULL    Ht:  68\" (172.7 cm)   Wt:  227 lb (103 kg)    Admission Cmt:  None   Principal Problem:  Sepsis due to pneumonia [J18.9,A41.9]                 Active Insurance as of 5/30/2017     Primary Coverage     Payor Plan Insurance Group Employer/Plan Group    MEDICARE MEDICARE A & B      Payor Plan Address Payor Plan Phone Number Effective From Effective To    [address] [phone] 7/1/2011     [place] [address]       Subscriber Name Subscriber Birth Date Member ID       CHARLES ARCHER 1945 [id number]           Secondary Coverage     Payor Plan Insurance Group Employer/Plan Group    [place]      Pay[place] Plan Address Payor Plan Phone Number Effective From Effective To    [address] [address]  1/1/2014     [place] [address]       Subscriber Name Subscriber Birth Date Member ID       CHARLES ARCHER 1945 [id number]                 Emergency Contacts      (Rel.) Home Phone Work Phone Mobile Phone    Hailey Friend (Daughter) [phone]            Emergency Contact Information     Name Relation Home Work Mobile    Hailey Friend Daughter [phone]          Insurance Information                MEDICARE/MEDICARE A & B Phone: [phone]    Subscriber: Charles Archer " Subscriber#: [id number]    Group#:  Precert#:         [place] Phone:     Subscriber: Mayra Hays Subscriber#: [id number]    Group#:  Precert#:              History & Physical      Todd Trammell MD at 2017 10:30 PM              [facility] HISTORY AND PHYSICAL    Patient Identification:  Name:  Mayra Hays  Age:  71 y.o.  Sex:  female  :  1945  MRN:  [id number]   Visit Number:  [id number]  Primary Care Physician:  ERASTO Lee    Chief complaint:  Shortness of air    History of presenting illness:  71 y.o. female who presented to EMS with shortness of air.  The patient was intubated and was not able to give me any history.  A daughter and son were at bedside.  The daughter states that the patient has been coughing for about a week.  The family begged the patient to come to the hospital but she avoids medical care as she does not like doctors and hospitals.  She found some antibiotics at home and took one dose; she felt better and did not take anymore.  She finally worsened so bad that the family called the ambulance and she was brought in.  She was so acidotic and ill appearing that she was emergently intubated.  CXR showed right sided infiltrates.  She was then placed in the Critical Care Unit for further evaluation and treatment.     The family states that she has smoked heavily for decades but quit several years ago.  She has never been placed on a ventilator before.  ---------------------------------------------------------------------------------------------------------------------   Review of Systems   Unable to perform ROS: Intubated    ---------------------------------------------------------------------------------------------------------------------   Past Medical History:   Diagnosis Date   • GERD (gastroesophageal reflux disease)    • Hyperlipidemia    • Hypertension      History reviewed. No pertinent surgical  history.     History reviewed. No pertinent family history.     Social History   • Marital status: Other     Social History Main Topics   • Smoking status: Former Smoker   • Smokeless tobacco: None   • Alcohol use None   • Drug use: None   • Sexual activity: Not Asked   ---------------------------------------------------------------------------------------------------------------------   Allergies:  Review of patient's allergies indicates no known allergies.  ---------------------------------------------------------------------------------------------------------------------   Prior to Admission Medications     Prescriptions Last Dose Informant Patient Reported? Taking?    fenofibrate (TRICOR) 54 MG tablet 5/29/2017 Medication Bottle Yes Yes    Take 54 mg by mouth Daily.    furosemide (LASIX) 20 MG tablet 5/29/2017 Medication Bottle Yes Yes    Take 20 mg by mouth Daily.    lisinopril (PRINIVIL,ZESTRIL) 10 MG tablet 5/29/2017 Medication Bottle Yes Yes    Take 10 mg by mouth Daily.    metoprolol succinate XL (TOPROL-XL) 100 MG 24 hr tablet 5/29/2017 Medication Bottle Yes Yes    Take 100 mg by mouth Daily.        Hospital Scheduled Meds:  aspirin 325 mg Oral Daily   aspirin 325 mg Oral Once   cefepime 2 g Intravenous Q8H   doxycycline 100 mg Intravenous Q12H   heparin (porcine) 5,000 Units Subcutaneous Q12H   ipratropium-albuterol 3 mL Nebulization 4x Daily - RT   metroNIDAZOLE 500 mg Intravenous Q8H     fentaNYL (SUBLIMAZE) PCA 1500 mcg/30 mL syringe     sodium chloride 100 mL/hr Last Rate: 100 mL/hr (05/30/17 3952)   ---------------------------------------------------------------------------------------------------------------------   Vent Mode: VC/AC  FiO2 (%):  [40 %] 40 %  S RR:  [22] 22  PEEP/CPAP (cm H2O):  [5 cm H20] 5 cm H20  MAP (cm H2O):  [12-15] 14    Vital Signs:  Temp:  [97.9 °F (36.6 °C)-103.1 °F (39.5 °C)] 97.9 °F (36.6 °C)  Heart Rate:  [] 57  Resp:  [18-40] 20  BP: ()/() 133/69  FiO2  (%):  [40 %] 40 %     Last 3 weights    05/30/17  1611 05/30/17 2010   Weight: 230 lb (104 kg) 236 lb 3.2 oz (107 kg)     Body mass index is 35.91 kg/(m^2).  ---------------------------------------------------------------------------------------------------------------------   Physical Exam:  Constitutional:  Well-developed and well-nourished.  Severe respiratory distress.  Sedated and intubated.      HENT:  Head: Normocephalic and atraumatic.  Mouth:  Moist mucous membranes.  Oral ETT and oral Gtube.  Eyes:  Conjunctivae and EOM are normal.  Pupils are equal, round, and reactive to light.  No scleral icterus.  Neck:  Neck supple.  No JVD present.    Cardiovascular:  Normal rate, regular rhythm and normal heart sounds with no murmur.  Pulmonary/Chest:  Severe respiratory distress, no wheezes, no crackles, with normal breath sounds and poor and fair air movement.  Abdominal:  Soft.  Bowel sounds are normal.  No distension and no tenderness.   Musculoskeletal:  No edema, no tenderness, and no deformity.  No red or swollen joints anywhere.    Neurological:  Sedated and on the ventilator.   Skin:  Skin is warm and dry.  No rash noted.  No pallor.   Peripheral vascular:  No edema and strong pulses on all 4 extremities.  Genitourinary:  Shetty catheter in place, yellow urine in the collection container.  ---------------------------------------------------------------------------------------------------------------------  EKG:  Ordered.  Shows Twave inversion of inferior and anterolateral leads, QTc 559 ms, heart rate 56 ms.  Telemetry:  NS with ST depression with heart rates of 50-70.  I have personally looked at both the EKG and the telemetry strips.  ---------------------------------------------------------------------------------------------------------------------   Results from last 7 days  Lab Units 05/30/17  2120 05/30/17  1704 05/30/17  1649   LACTATE mmol/L 2.4* 2.7*  --    WBC 10*3/mm3  --   --  18.80*    HEMOGLOBIN g/dL  --   --  14.4   HEMATOCRIT %  --   --  47.0   MCV fL  --   --  88.5   MCHC g/dL  --   --  30.6*   PLATELETS 10*3/mm3  --   --  298   INR   --   --  0.93         Results from last 7 days  Lab Units 05/30/17  1649   SODIUM mmol/L 138   POTASSIUM mmol/L 5.5*   CHLORIDE mmol/L 104   TOTAL CO2 mmol/L 31.4   BUN mg/dL 13   CREATININE mg/dL 1.02   EGFR IF NONAFRICN AM mL/min/1.73 54*   CALCIUM mg/dL 9.2   GLUCOSE mg/dL 308*   ALBUMIN g/dL 4.40   BILIRUBIN mg/dL 0.5   ALK PHOS U/L 106*   AST (SGOT) U/L 54*   ALT (SGPT) U/L 34   Estimated Creatinine Clearance: 65.7 mL/min (by C-G formula based on Cr of 1.02).            ---------------------------------------------------------------------------------------------------------------------  Imaging Results (last 7 days)     Procedure Component Value Units Date/Time    XR Chest 1 View [[id number]]:  I have personally looked at the CXR and there is RML and RLL pneumonia present with some hyperinflation.  Await the final radiology report. Updated:  05/30/17 1745   ---------------------------------------------------------------------------------------------------------------------  Assessment and Plan:  -Acute hypoxic and hypercapnic respiratory failure and sepsis due to right middle lobe and right lower lobe pneumonia  -Primary respiratory acidosis and a secondary compensatory metabolic alkalosis  -Lactic acidosis  -Prolonged QTc of 559 ms  -Elevated troponins concerning for type 2 myocardial infarction/NSTEMI  -Hyperglycemia, no history of diabetes mellitus  -Acute COPD exacerbation  -Elevated liver enzymes  -Morbid obesity  -Chronic kidney disease Stage 3 (baseline Cr 0.94-1.02)  -Mild hyperkalemia  -Past heavy tobacco smoking addiction    I have started her on a heparin drip and aspirin and EKG is being performed now; EKG was not able to be performed in the ED due to the patient being combative.  Will start her on flagyl, cefepime, and doxycyline for the  pneumonia.  Will give Duoneb for the COPD exacerbation and hold off on steroids due to the hyperglycemia.  Will obtain a Ha1c.  Will repeat labs in the morning.  Ha1c and TSH have been ordered.  Will give IVF for the sepsis.    Total ICU time is 60 minutes.    Todd Trammell MD  05/31/17  12:06 AM         Electronically signed by Todd Trammell MD at 5/31/2017  1:32 AM        Vital Signs (last 24 hours)       06/06 0700  -  06/07 0659 06/07 0700  -  06/07 1503   Most Recent    Temp (°F) 98.4 -  99.5    98.9 -  99.1     98.9 (37.2)    Heart Rate 63 -  97    72 -  103     87    Resp 21 -  28      21     21    BP 92/46 -  178/96    110/50 -  169/84     128/65    SpO2 (%) 90 -  100    (!)89 -  97     94          Intake & Output (last day)       06/06 0701 - 06/07 0700 06/07 0701 - 06/08 0700    P.O.  120    I.V. (mL/kg) 614.4 (6) 462.8 (4.5)    Other 280     NG/ 158    IV Piggyback 1100 200    Total Intake(mL/kg) 2629.4 (25.5) 940.8 (9.1)    Urine (mL/kg/hr) 3140 (1.3) 2415 (2.9)    Stool 0 (0)     Total Output 3140 2415    Net -510.6 -1474.2          Unmeasured Stool Occurrence 1 x         Hospital Medications (active)       Dose Frequency Start End    acetylcysteine (MUCOMYST) 20 % solution 600 mg 600 mg Every 12 Hours Scheduled 6/6/2017 6/11/2017    Sig - Route: Inhale 3 mL Every 12 (Twelve) Hours. - Inhalation    aspirin tablet 325 mg 325 mg Daily 5/31/2017     Sig - Route: Take 1 tablet by mouth Daily. - Oral    atorvastatin (LIPITOR) tablet 20 mg 20 mg Nightly 5/31/2017     Sig - Route: Take 1 tablet by mouth Every Night. - Oral    castor oil-balsam peru (VENELEX) ointment  Every 12 Hours Scheduled 5/31/2017     Sig - Route: Apply  topically Every 12 (Twelve) Hours. - Topical    dextrose (D50W) solution 25 g 25 g Every 15 Minutes PRN 6/2/2017     Sig - Route: Infuse 50 mL into a venous catheter Every 15 (Fifteen) Minutes As Needed for Low Blood Sugar (Blood Sugar Less Than 70, Patient Has IV Access -  "Unresponsive, NPO or Unable To Safely Swallow). - Intravenous    dextrose (GLUTOSE) oral gel 15 g 15 g Every 15 Minutes PRN 6/2/2017     Sig - Route: Take 15 g by mouth Every 15 (Fifteen) Minutes As Needed for Low Blood Sugar (Blood Sugar Less Than 70, Patient Alert, Is Not NPO & Can Safely Swallow). - Oral    enoxaparin (LOVENOX) syringe 40 mg 40 mg Nightly 6/1/2017     Sig - Route: Inject 0.4 mL under the skin Every Night. - Subcutaneous    furosemide (LASIX) injection 60 mg 60 mg Every 12 Hours Scheduled 6/7/2017     Sig - Route: Infuse 6 mL into a venous catheter Every 12 (Twelve) Hours. - Intravenous    glucagon (human recombinant) (GLUCAGEN DIAGNOSTIC) injection 1 mg 1 mg Every 15 Minutes PRN 6/2/2017     Sig - Route: Inject 1 mg under the skin Every 15 (Fifteen) Minutes As Needed (Blood Glucose Less Than 70 - Patient Without IV Access - Unresponsive, NPO or Unable To Safely Swallow). - Subcutaneous    ipratropium-albuterol (DUO-NEB) nebulizer solution 3 mL 3 mL 4 Times Daily - RT 5/31/2017     Sig - Route: Take 3 mL by nebulization 4 (Four) Times a Day. - Nebulization    Magnesium Sulfate 2 gram Bolus, followed by 8 gram infusion (total Mg dose 10 grams)- Mg less than or equal to 1mg/dL 2 g As Needed 5/31/2017     Sig - Route: Infuse 50 mL into a venous catheter As Needed (Mg less than or equal to 1mg/dL). - Intravenous    Linked Group 1:  \"Or\" Linked Group Details        magnesium sulfate 4 gram infusion- Mg 1.6-1.9 mg/dL 4 g As Needed 5/31/2017     Sig - Route: Infuse 100 mL into a venous catheter As Needed (Mg 1.6-1.9 mg/dL). - Intravenous    Linked Group 1:  \"Or\" Linked Group Details        Magnesium Sulfate 6 gram Infusion (2 gm x 3) -Mg 1.1 -1.5 mg/dL 2 g As Needed 5/31/2017     Sig - Route: Infuse 50 mL into a venous catheter As Needed (Mg 1.1 -1.5 mg/dL). - Intravenous    Linked Group 1:  \"Or\" Linked Group Details        metoclopramide (REGLAN) injection 5 mg 5 mg 3 Times Daily 6/4/2017     Sig - " "Route: Infuse 1 mL into a venous catheter 3 (Three) Times a Day. - Intravenous    metroNIDAZOLE (FLAGYL) IVPB 500 mg 500 mg Every 8 Hours Scheduled 5/31/2017     Sig - Route: Infuse 100 mL into a venous catheter Every 8 (Eight) Hours. - Intravenous    pantoprazole (PROTONIX) injection 40 mg 40 mg Every Early Morning 6/2/2017     Sig - Route: Infuse 10 mL into a venous catheter Every Morning. - Intravenous    Pharmacy Meds to Bed Consult  Daily 5/31/2017     Sig - Route: Daily. - Does not apply    polyethylene glycol (MIRALAX) powder 17 g 17 g Daily 6/2/2017     Sig - Route: Take 17 g by mouth Daily. - Oral    potassium chloride (KLOR-CON) packet 40 mEq 40 mEq As Needed 5/31/2017     Sig - Route: Take 40 mEq by mouth As Needed (potassium replacement, see admin instructions). - Oral    Linked Group 2:  \"Or\" Linked Group Details        potassium chloride (KLOR-CON) packet 40 mEq 40 mEq Every 4 Hours 6/7/2017 6/7/2017    Sig - Route: Take 40 mEq by mouth Every 4 (Four) Hours. - Oral    potassium chloride (KLOR-CON) packet 40 mEq 40 mEq Once 6/7/2017 6/7/2017    Sig - Route: Take 40 mEq by mouth 1 (One) Time. - Oral    potassium chloride (MICRO-K) CR capsule 40 mEq 40 mEq As Needed 5/31/2017     Sig - Route: Take 4 capsules by mouth As Needed (potassium replacement.  see admin instructions). - Oral    Linked Group 2:  \"Or\" Linked Group Details        potassium chloride 10 mEq in 100 mL IVPB 10 mEq Every 1 Hour PRN 5/31/2017     Sig - Route: Infuse 100 mL into a venous catheter Every 1 (One) Hour As Needed (potassium protocol PERIPHERAL - see admin instructions). - Intravenous    Linked Group 2:  \"Or\" Linked Group Details        predniSONE (DELTASONE) tablet 10 mg 10 mg Daily With Breakfast 6/8/2017     Sig - Route: Take 1 tablet by mouth Daily With Breakfast. - Oral    propofol (DIPRIVAN) infusion 10 mg/mL 100 mL 5-50 mcg/kg/min × 107 kg Titrated 5/31/2017     Sig - Route: Infuse 535-5,350 mcg/min into a venous catheter " Dose Adjusted By Provider As Needed. - Intravenous    Notes to Pharmacy: Order received from Dr Peter to start diprivan per protocol if patient remains bradycardic on precedex IV    sodium chloride 0.9 % flush 1-10 mL 1-10 mL As Needed 5/30/2017     Sig - Route: Infuse 1-10 mL into a venous catheter As Needed for Line Care. - Intravenous    sodium chloride 0.9 % flush 10 mL 10 mL Every 12 Hours Scheduled 6/2/2017     Sig - Route: 10 mL by Intracatheter route Every 12 (Twelve) Hours. - Intracatheter    Cosign for Ordering: Accepted by Abdulkadir Peter MD on 6/2/2017  4:12 PM    sodium chloride 0.9 % flush 10 mL 10 mL As Needed 6/2/2017     Sig - Route: 10 mL by Intracatheter route As Needed for Line Care (After Medication Administration or Blood Draw). - Intracatheter    Cosign for Ordering: Accepted by Abdulkadir Peter MD on 6/2/2017  4:12 PM    sodium chloride 0.9 % flush 10 mL 10 mL Every 12 Hours Scheduled 6/2/2017     Sig - Route: 10 mL by Intracatheter route Every 12 (Twelve) Hours. - Intracatheter    Cosign for Ordering: Accepted by Abdulkadir Peter MD on 6/2/2017  4:12 PM    sodium chloride 0.9 % flush 10 mL 10 mL As Needed 6/2/2017     Sig - Route: 10 mL by Intracatheter route As Needed for Line Care (After Medication Administration or Blood Draw). - Intracatheter    Cosign for Ordering: Accepted by Abdulkadir Peter MD on 6/2/2017  4:12 PM    vancomycin (VANCOCIN) 1,250 mg in sodium chloride 0.9 % 250 mL IVPB 1,250 mg Every 12 Hours 6/2/2017     Sig - Route: Infuse 1,250 mg into a venous catheter Every 12 (Twelve) Hours. - Intravenous    cefepime (MAXIPIME) 2 g/100 mL 0.9% NS (mbp) (Discontinued) 2 g Every 8 Hours Scheduled 5/31/2017 6/7/2017    Sig - Route: Infuse 100 mL into a venous catheter Every 8 (Eight) Hours. - Intravenous    DOPamine 400 mg/250 mL (1.6 mg/mL) infusion (Discontinued) 2-20 mcg/kg/min × 104 kg Titrated 6/6/2017 6/7/2017    Sig - Route: Infuse 208-2,080 mcg/min into a venous catheter Dose  Adjusted By Provider As Needed. - Intravenous    furosemide (LASIX) injection 40 mg (Discontinued) 40 mg Every 12 Hours Scheduled 6/7/2017 6/7/2017    Sig - Route: Infuse 4 mL into a venous catheter Every 12 (Twelve) Hours. - Intravenous    potassium chloride (K-DUR,KLOR-CON) CR tablet 40 mEq (Discontinued) 40 mEq Every 4 Hours 6/7/2017 6/7/2017    Sig - Route: Take 2 tablets by mouth Every 4 (Four) Hours. - Oral    Reason for Discontinue: Reorder    predniSONE (DELTASONE) tablet 20 mg (Discontinued) 20 mg Daily With Breakfast 6/5/2017 6/7/2017    Sig - Route: Take 1 tablet by mouth Daily With Breakfast. - Oral            Lab Results (last 24 hours)     Procedure Component Value Units Date/Time    POC Glucose Fingerstick [[id number]]  (Normal) Collected:  06/06/17 1206    Specimen:  Blood Updated:  06/06/17 1751     Glucose 129 mg/dL     Narrative:       Meter: [id number] : [id number] viktoriya kian    POC Glucose Fingerstick [[id number]]  (Abnormal) Collected:  06/06/17 1810    Specimen:  Blood Updated:  06/06/17 1816     Glucose 138 (H) mg/dL     Narrative:       Meter: [id number] : [id number] viktoriya kian    POC Glucose Fingerstick [[id number]]  (Normal) Collected:  06/07/17 0023    Specimen:  Blood Updated:  06/07/17 0029     Glucose 96 mg/dL     Narrative:       Meter: [id number] : [id number] ALEENA CEE    CBC & Differential [[id number]] Collected:  06/07/17 0100    Specimen:  Blood Updated:  06/07/17 0116    Narrative:       The following orders were created for panel order CBC & Differential.  Procedure                               Abnormality         Status                     ---------                               -----------         ------                     CBC Auto Differential[[id number]]        Abnormal            Final result                 Please view results for these tests on the individual orders.    CBC Auto Differential [[id number]]  (Abnormal) Collected:  06/07/17 0100    Specimen:   Blood Updated:  06/07/17 0116     WBC 9.41 10*3/mm3      RBC 4.28 10*6/mm3      Hemoglobin 11.3 (L) g/dL      Hematocrit 37.0 %      MCV 86.4 fL      MCH 26.4 (L) pg      MCHC 30.5 (L) g/dL      RDW 13.5 %      RDW-SD 42.2 fl      MPV 11.6 (H) fL      Platelets 316 10*3/mm3      Neutrophil % 57.9 %      Lymphocyte % 21.3 %      Monocyte % 10.9 %      Eosinophil % 4.8 %      Basophil % 1.1 %      Immature Grans % 4.0 (H) %      Neutrophils, Absolute 5.45 10*3/mm3      Lymphocytes, Absolute 2.00 10*3/mm3      Monocytes, Absolute 1.03 (H) 10*3/mm3      Eosinophils, Absolute 0.45 10*3/mm3      Basophils, Absolute 0.10 10*3/mm3      Immature Grans, Absolute 0.38 (H) 10*3/mm3     Basic Metabolic Panel [[id number]]  (Abnormal) Collected:  06/07/17 0100    Specimen:  Blood Updated:  06/07/17 0143     Glucose 118 (H) mg/dL      BUN 25 (H) mg/dL      Creatinine 0.54 mg/dL      Sodium 144 mmol/L      Potassium 3.4 (L) mmol/L      Chloride 116 (H) mmol/L      CO2 24.6 mmol/L      Calcium 9.2 mg/dL      eGFR Non African Amer 112 mL/min/1.73      BUN/Creatinine Ratio 46.3 (H)     Anion Gap 3.4 (L) mmol/L     Narrative:       GFR Normal >60  Chronic Kidney Disease <60  Kidney Failure <15    Magnesium [[id number]]  (Normal) Collected:  06/07/17 0100    Specimen:  Blood Updated:  06/07/17 0143     Magnesium 2.2 mg/dL     Osmolality, Calculated [[id number]]  (Normal) Collected:  06/07/17 0100    Specimen:  Blood Updated:  06/07/17 0143     Osmolality Calc 292.3 mOsm/kg     Phosphorus [[id number]]  (Normal) Collected:  06/07/17 0100    Specimen:  Blood Updated:  06/07/17 0144     Phosphorus 3.5 mg/dL     Blood Gas, Arterial [[id number]]  (Abnormal) Collected:  06/07/17 0439    Specimen:  Arterial Blood Updated:  06/07/17 0453     Site Arterial: left radial     Gonzalo's Test Positive     pH, Arterial 7.354 pH units      pCO2, Arterial 42.0 mm Hg      pO2, Arterial 61.8 (L) mm Hg      HCO3, Arterial 22.9 mmol/L      Base Excess, Arterial -2.6  mmol/L      O2 Saturation, Arterial 90.3 %      Hemoglobin, Blood Gas 12.1 g/dL      Hematocrit, Blood Gas 36.0 (L) %      Oxyhemoglobin 88.5 %      Methemoglobin 0.3 %      Carboxyhemoglobin 1.7 %      A-a Gradiant 57.3 mmHg      Temperature 98.6 C      Barometric Pressure for Blood Gas 723 mmHg      Modality Adult Vent     FIO2 25 %      Ventilator Mode ac     Set Tidal Volume 400     Set Mech Resp Rate 21     PEEP 5    POC Glucose Fingerstick [[id number]]  (Normal) Collected:  06/07/17 0541    Specimen:  Blood Updated:  06/07/17 0558     Glucose 129 mg/dL     Narrative:       Meter: [id number] : [id number] ALEENA CEE        Imaging Results (last 24 hours)     Procedure Component Value Units Date/Time    XR Chest AP [[id number]] Collected:  06/07/17 0941     Updated:  06/07/17 0947    Narrative:       XR CHEST AP-     CLINICAL INDICATION: Sedation in intubated patients.          COMPARISON: 06/06/2017.      TECHNIQUE: Single frontal view of the chest.     FINDINGS:     Slight improvement in aeration.  Mild pulmonary congestion.  There is no evidence of an acute osseous abnormality.   There are no suspicious-appearing parenchymal soft tissue nodules.            Impression:       Slight interval improvement.         This report was finalized on 6/7/2017 9:45 AM by Dr. Mauricio Santiago MD.           Operative/Procedure Notes (last 24 hours) (Notes from 6/6/2017  3:04 PM through 6/7/2017  3:04 PM)     No notes of this type exist for this encounter.           Physician Progress Notes (last 24 hours) (Notes from 6/6/2017  3:04 PM through 6/7/2017  3:04 PM)      James Mercer MD at 6/7/2017  8:48 AM  Version 2 of 2           I have personally seen and examined the patient today and discussed overnight interval progress and pertinent issues with nursing staff.    Review of Systems-unable to obtain as the patient is sedated and intubated on the ventilator    History taken from: chart RN      Vital Signs    /86  " Pulse 79  Temp 99.1 °F (37.3 °C) (Oral)   Resp 23  Ht 68\" (172.7 cm)  Wt 227 lb (103 kg)  SpO2 95%  BMI 34.52 kg/m2    Temp:  [98.7 °F (37.1 °C)-99.5 °F (37.5 °C)] 99.1 °F (37.3 °C)      Intake/Output Summary (Last 24 hours) at 06/07/17 0848  Last data filed at 06/07/17 0800   Gross per 24 hour   Intake           2629.4 ml   Output             3185 ml   Net           -555.6 ml     Intake & Output (last 3 days)       06/04 0701 - 06/05 0700 06/05 0701 - 06/06 0700 06/06 0701 - 06/07 0700 06/07 0701 - 06/08 0700    I.V. (mL/kg) 299.6 (2.9) 227 (2.2) 614.4 (6)     Other 50 130 280     NG/GT  935 635     IV Piggyback 1100 1200 1100 100    Total Intake(mL/kg) 1449.6 (14) 2492 (24.1) 2629.4 (25.5) 100 (1)    Urine (mL/kg/hr) 3045 (1.2) 1790 (0.7) 3140 (1.3) 155 (0.8)    Stool  0 (0) 0 (0)     Total Output 3045 1790 3140 155    Net -1595.4 +702 -510.6 -55            Unmeasured Stool Occurrence  2 x 1 x            Physical Exam:      General Appearance:  Intubated, sedated    Head:  Normocephalic, without obvious abnormality, atraumatic   Eyes:      Lids and lashes normal, conjunctivae and sclerae normal, no icterus, no pallor, corneas clear, PERRLA   Ears:  Ears appear intact with no abnormalities noted   Throat: No oral lesions, no thrush, oral mucosa moist   Neck: No adenopathy, supple, trachea midline, no thyromegaly, no carotid bruit, no JVD   Back:  No tenderness to percussion or palpation, range of motion normal   Lungs:  Wheezing bilaterally on inspiration    Heart:  Regular rhythm and normal rate, normal S1 and S2, no murmur, no gallop, no rub, no click   Chest Wall:  No abnormalities observed   Abdomen:  Distended, Normal bowel sounds, no masses, no organomegaly, non-tender, no guarding, no rebound tenderness   Rectal:  Deferred   Extremities: Moves all extremities well, no edema, no cyanosis, no redness   Pulses: Pulses palpable and equal bilaterally   Skin: No bleeding, bruising or rash   Lymph nodes: No " palpable adenopathy   Neurologic: Sedated            Results:      Results from last 7 days  Lab Units 06/07/17  0100 06/06/17  0410 06/05/17  0134 06/04/17  0355 06/03/17  0058 06/02/17  0315 06/01/17  0527   WBC 10*3/mm3 9.41 8.90 8.67 7.33 8.22 11.48 12.76*     Lab Results   Component Value Date    NEUTROABS 5.45 06/07/2017         Results from last 7 days  Lab Units 06/07/17  0100   CREATININE mg/dL 0.54         Results from last 7 days  Lab Units 06/06/17  0410 06/05/17  0134 06/04/17  0355   CRP mg/dL 1.64* 5.39* 8.09*       Results Review:    I have personally reviewed laboratory data, culture results, radiology studies and antimicrobial therapy.    Hospital Medications (active)       Dose Frequency Start End    aspirin tablet 325 mg 325 mg Daily 5/31/2017     Sig - Route: Take 1 tablet by mouth Daily. - Oral    atorvastatin (LIPITOR) tablet 20 mg 20 mg Nightly 5/31/2017     Sig - Route: Take 1 tablet by mouth Every Night. - Oral    castor oil-balsam peru (VENELEX) ointment  Every 12 Hours Scheduled 5/31/2017     Sig - Route: Apply  topically Every 12 (Twelve) Hours. - Topical    cefepime (MAXIPIME) 2 g/100 mL 0.9% NS (mbp) 2 g Every 8 Hours Scheduled 5/31/2017     Sig - Route: Infuse 100 mL into a venous catheter Every 8 (Eight) Hours. - Intravenous    DOPamine 400 mg/250 mL (1.6 mg/mL) infusion 5 mcg/kg/min × 107 kg Titrated 5/31/2017     Sig - Route: Infuse 535 mcg/min into a venous catheter Dose Adjusted By Provider As Needed. - Intravenous    enoxaparin (LOVENOX) syringe 40 mg 40 mg Nightly 6/1/2017     Sig - Route: Inject 0.4 mL under the skin Every Night. - Subcutaneous    furosemide (LASIX) injection 60 mg 60 mg Once 6/2/2017 6/2/2017    Sig - Route: Infuse 6 mL into a venous catheter 1 (One) Time. - Intravenous    ipratropium-albuterol (DUO-NEB) nebulizer solution 3 mL 3 mL 4 Times Daily - RT 5/31/2017     Sig - Route: Take 3 mL by nebulization 4 (Four) Times a Day. - Nebulization    Magnesium  "Sulfate 2 gram Bolus, followed by 8 gram infusion (total Mg dose 10 grams)- Mg less than or equal to 1mg/dL 2 g As Needed 5/31/2017     Sig - Route: Infuse 50 mL into a venous catheter As Needed (Mg less than or equal to 1mg/dL). - Intravenous    Linked Group 1:  \"Or\" Linked Group Details        magnesium sulfate 4 gram infusion- Mg 1.6-1.9 mg/dL 4 g As Needed 5/31/2017     Sig - Route: Infuse 100 mL into a venous catheter As Needed (Mg 1.6-1.9 mg/dL). - Intravenous    Linked Group 1:  \"Or\" Linked Group Details        Magnesium Sulfate 6 gram Infusion (2 gm x 3) -Mg 1.1 -1.5 mg/dL 2 g As Needed 5/31/2017     Sig - Route: Infuse 50 mL into a venous catheter As Needed (Mg 1.1 -1.5 mg/dL). - Intravenous    Linked Group 1:  \"Or\" Linked Group Details        metroNIDAZOLE (FLAGYL) IVPB 500 mg 500 mg Every 8 Hours Scheduled 5/31/2017     Sig - Route: Infuse 100 mL into a venous catheter Every 8 (Eight) Hours. - Intravenous    Pharmacy Meds to Bed Consult  Daily 5/31/2017     Sig - Route: Daily. - Does not apply    potassium chloride (KLOR-CON) packet 40 mEq 40 mEq As Needed 5/31/2017     Sig - Route: Take 40 mEq by mouth As Needed (potassium replacement, see admin instructions). - Oral    Linked Group 2:  \"Or\" Linked Group Details        potassium chloride (KLOR-CON) packet 40 mEq 40 mEq Once 6/2/2017 6/2/2017    Sig - Route: Take 40 mEq by mouth 1 (One) Time. - Oral    potassium chloride (MICRO-K) CR capsule 40 mEq 40 mEq As Needed 5/31/2017     Sig - Route: Take 4 capsules by mouth As Needed (potassium replacement.  see admin instructions). - Oral    Linked Group 2:  \"Or\" Linked Group Details        potassium chloride 10 mEq in 100 mL IVPB 10 mEq Every 1 Hour PRN 5/31/2017     Sig - Route: Infuse 100 mL into a venous catheter Every 1 (One) Hour As Needed (potassium protocol PERIPHERAL - see admin instructions). - Intravenous    Linked Group 2:  \"Or\" Linked Group Details        propofol (DIPRIVAN) infusion 10 mg/mL 100 mL " 5-50 mcg/kg/min × 107 kg Titrated 5/31/2017     Sig - Route: Infuse 535-5,350 mcg/min into a venous catheter Dose Adjusted By Provider As Needed. - Intravenous    Notes to Pharmacy: Order received from Dr Peter to start diprivan per protocol if patient remains bradycardic on precedex IV    sodium chloride (NS) 0.9 % irrigation solution  - ADS Override Pull   6/2/2017 6/2/2017    Notes to Pharmacy: Created by cabinet override    sodium chloride 0.9 % flush 1-10 mL 1-10 mL As Needed 5/30/2017     Sig - Route: Infuse 1-10 mL into a venous catheter As Needed for Line Care. - Intravenous    vancomycin (VANCOCIN) 1,250 mg in sodium chloride 0.9 % 250 mL IVPB 1,250 mg Every 18 Hours 6/1/2017     Sig - Route: Infuse 1,250 mg into a venous catheter Every 18 (Eighteen) Hours. - Intravenous    dexmedetomidine HCl (PRECEDEX) 400 mcg in sodium chloride 0.9 % 100 mL (4 mcg/mL) infusion (Discontinued) 0.2-1.5 mcg/kg/hr × 107 kg Titrated 5/31/2017 6/2/2017    Sig - Route: Infuse 21.4-160.5 mcg/hr into a venous catheter Dose Adjusted By Provider As Needed. - Intravenous    doxycycline (VIBRAMYCIN) 100 mg/100 mL 0.9% NS MBP (Discontinued) 100 mg Every 12 Hours 5/31/2017 6/1/2017    Sig - Route: Infuse 100 mL into a venous catheter Every 12 (Twelve) Hours. - Intravenous    heparin (porcine) 5000 UNIT/ML injection 2,500 Units (Discontinued) 23.4 Units/kg × 107 kg As Needed 5/31/2017 6/1/2017    Sig - Route: Infuse 0.5 mL into a venous catheter As Needed (Per Heparin Nomogram For PTT 45-55). - Intravenous    Reason for Discontinue: Drug interaction    heparin (porcine) 5000 UNIT/ML injection 5,000 Units (Discontinued) 46.7 Units/kg × 107 kg As Needed 5/31/2017 6/1/2017    Sig - Route: Infuse 1 mL into a venous catheter As Needed (Per Heparin Nomogram For PTT Less Than 45). - Intravenous    Reason for Discontinue: Drug interaction    heparin 62964 units/250 mL (100 units/mL) in 0.45 % NaCl infusion (Discontinued) 9.35 Units/kg/hr × 81.1  kg (Adjusted) Titrated 5/31/2017 6/1/2017    Sig - Route: Infuse 758.285 Units/hr into a venous catheter Dose Adjusted By Provider As Needed. - Intravenous    sodium chloride 0.9 % infusion (Discontinued) 100 mL/hr Continuous 5/30/2017 6/2/2017    Sig - Route: Infuse 100 mL/hr into a venous catheter Continuous. - Intravenous            Cultures:    Blood Culture   Date Value Ref Range Status   05/31/2017 No growth at 24 hours  Preliminary   05/31/2017 No growth at 24 hours  Preliminary   05/30/2017 No growth at 2 days  Preliminary   05/30/2017 Gram positive cocci, consistent with Staph spp (A)  Preliminary       PROBLEM LIST:    Patient Active Problem List   Diagnosis   • Pneumonia   • Sepsis due to pneumonia       Assessment/Plan     ASSESSMENT:    1. Sepsis  2. Staph bacteremia  3. Pneumonia     PLAN:    Low-grade fever of 99.5 with one loose bowel movement overnight.  Off pressors but still on the ventilator.  Daughter bedside.  Normal white count.  Overall patient has shown improvement from infectious disease standpoint.    Blood cultures from 5/30/2017 still showing staph 1 set out of 2 which is most likely a contaminant in the setting of such slow growth.  Otherwise remaining cultures including BAL are showing no growth.  We will continue to monitor closely.  Hope to de-escalate soon if the patient continues to show persistent progress.      Patients findings and recommendations were discussed with nursing staff    Code Status: Full Code     Scribed for Dr. Mercer by Rosalia Pinto PA-C.    Rosalia Pinto PA-C  06/07/17  8:48 AM    Physician Attestation:    I have personally seen and examined the patient. I reviewed the patient's data including history of present illness, review of systems, physical examination, assessment and treatment plan and agree with findings above. The assessment and plan are my own.  I have reviewed and edited the note above after discussing the findings with Rosalia Pinto  "SHY.    James Mercer MD  Infectious Diseases  06/07/17  8:48 AM       Electronically signed by James Mercer MD at 6/7/2017 10:45 AM      Rosalia Pinto PA-C at 6/7/2017  8:48 AM  Version 1 of 2           I have personally seen and examined the patient today and discussed overnight interval progress and pertinent issues with nursing staff.    Review of Systems-unable to obtain as the patient is sedated and intubated on the ventilator    History taken from: chart RN      Vital Signs    /86  Pulse 79  Temp 99.1 °F (37.3 °C) (Oral)   Resp 23  Ht 68\" (172.7 cm)  Wt 227 lb (103 kg)  SpO2 95%  BMI 34.52 kg/m2    Temp:  [98.7 °F (37.1 °C)-99.5 °F (37.5 °C)] 99.1 °F (37.3 °C)      Intake/Output Summary (Last 24 hours) at 06/07/17 0848  Last data filed at 06/07/17 0800   Gross per 24 hour   Intake           2629.4 ml   Output             3185 ml   Net           -555.6 ml     Intake & Output (last 3 days)       06/04 0701 - 06/05 0700 06/05 0701 - 06/06 0700 06/06 0701 - 06/07 0700 06/07 0701 - 06/08 0700    I.V. (mL/kg) 299.6 (2.9) 227 (2.2) 614.4 (6)     Other 50 130 280     NG/GT  935 635     IV Piggyback 1100 1200 1100 100    Total Intake(mL/kg) 1449.6 (14) 2492 (24.1) 2629.4 (25.5) 100 (1)    Urine (mL/kg/hr) 3045 (1.2) 1790 (0.7) 3140 (1.3) 155 (0.8)    Stool  0 (0) 0 (0)     Total Output 3045 1790 3140 155    Net -1595.4 +702 -510.6 -55            Unmeasured Stool Occurrence  2 x 1 x            Physical Exam:      General Appearance:  Intubated, sedated    Head:  Normocephalic, without obvious abnormality, atraumatic   Eyes:      Lids and lashes normal, conjunctivae and sclerae normal, no icterus, no pallor, corneas clear, PERRLA   Ears:  Ears appear intact with no abnormalities noted   Throat: No oral lesions, no thrush, oral mucosa moist   Neck: No adenopathy, supple, trachea midline, no thyromegaly, no carotid bruit, no JVD   Back:  No tenderness to percussion or palpation, range of motion " normal   Lungs:  Wheezing bilaterally on inspiration    Heart:  Regular rhythm and normal rate, normal S1 and S2, no murmur, no gallop, no rub, no click   Chest Wall:  No abnormalities observed   Abdomen:  Distended, Normal bowel sounds, no masses, no organomegaly, non-tender, no guarding, no rebound tenderness   Rectal:  Deferred   Extremities: Moves all extremities well, no edema, no cyanosis, no redness   Pulses: Pulses palpable and equal bilaterally   Skin: No bleeding, bruising or rash   Lymph nodes: No palpable adenopathy   Neurologic: Sedated            Results:      Results from last 7 days  Lab Units 06/07/17  0100 06/06/17  0410 06/05/17  0134 06/04/17  0355 06/03/17  0058 06/02/17  0315 06/01/17  0527   WBC 10*3/mm3 9.41 8.90 8.67 7.33 8.22 11.48 12.76*     Lab Results   Component Value Date    NEUTROABS 5.45 06/07/2017         Results from last 7 days  Lab Units 06/07/17  0100   CREATININE mg/dL 0.54         Results from last 7 days  Lab Units 06/06/17  0410 06/05/17  0134 06/04/17  0355   CRP mg/dL 1.64* 5.39* 8.09*       Results Review:    I have personally reviewed laboratory data, culture results, radiology studies and antimicrobial therapy.    Hospital Medications (active)       Dose Frequency Start End    aspirin tablet 325 mg 325 mg Daily 5/31/2017     Sig - Route: Take 1 tablet by mouth Daily. - Oral    atorvastatin (LIPITOR) tablet 20 mg 20 mg Nightly 5/31/2017     Sig - Route: Take 1 tablet by mouth Every Night. - Oral    castor oil-balsam peru (VENELEX) ointment  Every 12 Hours Scheduled 5/31/2017     Sig - Route: Apply  topically Every 12 (Twelve) Hours. - Topical    cefepime (MAXIPIME) 2 g/100 mL 0.9% NS (mbp) 2 g Every 8 Hours Scheduled 5/31/2017     Sig - Route: Infuse 100 mL into a venous catheter Every 8 (Eight) Hours. - Intravenous    DOPamine 400 mg/250 mL (1.6 mg/mL) infusion 5 mcg/kg/min × 107 kg Titrated 5/31/2017     Sig - Route: Infuse 535 mcg/min into a venous catheter Dose  "Adjusted By Provider As Needed. - Intravenous    enoxaparin (LOVENOX) syringe 40 mg 40 mg Nightly 6/1/2017     Sig - Route: Inject 0.4 mL under the skin Every Night. - Subcutaneous    furosemide (LASIX) injection 60 mg 60 mg Once 6/2/2017 6/2/2017    Sig - Route: Infuse 6 mL into a venous catheter 1 (One) Time. - Intravenous    ipratropium-albuterol (DUO-NEB) nebulizer solution 3 mL 3 mL 4 Times Daily - RT 5/31/2017     Sig - Route: Take 3 mL by nebulization 4 (Four) Times a Day. - Nebulization    Magnesium Sulfate 2 gram Bolus, followed by 8 gram infusion (total Mg dose 10 grams)- Mg less than or equal to 1mg/dL 2 g As Needed 5/31/2017     Sig - Route: Infuse 50 mL into a venous catheter As Needed (Mg less than or equal to 1mg/dL). - Intravenous    Linked Group 1:  \"Or\" Linked Group Details        magnesium sulfate 4 gram infusion- Mg 1.6-1.9 mg/dL 4 g As Needed 5/31/2017     Sig - Route: Infuse 100 mL into a venous catheter As Needed (Mg 1.6-1.9 mg/dL). - Intravenous    Linked Group 1:  \"Or\" Linked Group Details        Magnesium Sulfate 6 gram Infusion (2 gm x 3) -Mg 1.1 -1.5 mg/dL 2 g As Needed 5/31/2017     Sig - Route: Infuse 50 mL into a venous catheter As Needed (Mg 1.1 -1.5 mg/dL). - Intravenous    Linked Group 1:  \"Or\" Linked Group Details        metroNIDAZOLE (FLAGYL) IVPB 500 mg 500 mg Every 8 Hours Scheduled 5/31/2017     Sig - Route: Infuse 100 mL into a venous catheter Every 8 (Eight) Hours. - Intravenous    Pharmacy Meds to Bed Consult  Daily 5/31/2017     Sig - Route: Daily. - Does not apply    potassium chloride (KLOR-CON) packet 40 mEq 40 mEq As Needed 5/31/2017     Sig - Route: Take 40 mEq by mouth As Needed (potassium replacement, see admin instructions). - Oral    Linked Group 2:  \"Or\" Linked Group Details        potassium chloride (KLOR-CON) packet 40 mEq 40 mEq Once 6/2/2017 6/2/2017    Sig - Route: Take 40 mEq by mouth 1 (One) Time. - Oral    potassium chloride (MICRO-K) CR capsule 40 mEq 40 " "mEq As Needed 5/31/2017     Sig - Route: Take 4 capsules by mouth As Needed (potassium replacement.  see admin instructions). - Oral    Linked Group 2:  \"Or\" Linked Group Details        potassium chloride 10 mEq in 100 mL IVPB 10 mEq Every 1 Hour PRN 5/31/2017     Sig - Route: Infuse 100 mL into a venous catheter Every 1 (One) Hour As Needed (potassium protocol PERIPHERAL - see admin instructions). - Intravenous    Linked Group 2:  \"Or\" Linked Group Details        propofol (DIPRIVAN) infusion 10 mg/mL 100 mL 5-50 mcg/kg/min × 107 kg Titrated 5/31/2017     Sig - Route: Infuse 535-5,350 mcg/min into a venous catheter Dose Adjusted By Provider As Needed. - Intravenous    Notes to Pharmacy: Order received from Dr Peter to start diprivan per protocol if patient remains bradycardic on precedex IV    sodium chloride (NS) 0.9 % irrigation solution  - ADS Override Pull   6/2/2017 6/2/2017    Notes to Pharmacy: Created by cabinet override    sodium chloride 0.9 % flush 1-10 mL 1-10 mL As Needed 5/30/2017     Sig - Route: Infuse 1-10 mL into a venous catheter As Needed for Line Care. - Intravenous    vancomycin (VANCOCIN) 1,250 mg in sodium chloride 0.9 % 250 mL IVPB 1,250 mg Every 18 Hours 6/1/2017     Sig - Route: Infuse 1,250 mg into a venous catheter Every 18 (Eighteen) Hours. - Intravenous    dexmedetomidine HCl (PRECEDEX) 400 mcg in sodium chloride 0.9 % 100 mL (4 mcg/mL) infusion (Discontinued) 0.2-1.5 mcg/kg/hr × 107 kg Titrated 5/31/2017 6/2/2017    Sig - Route: Infuse 21.4-160.5 mcg/hr into a venous catheter Dose Adjusted By Provider As Needed. - Intravenous    doxycycline (VIBRAMYCIN) 100 mg/100 mL 0.9% NS MBP (Discontinued) 100 mg Every 12 Hours 5/31/2017 6/1/2017    Sig - Route: Infuse 100 mL into a venous catheter Every 12 (Twelve) Hours. - Intravenous    heparin (porcine) 5000 UNIT/ML injection 2,500 Units (Discontinued) 23.4 Units/kg × 107 kg As Needed 5/31/2017 6/1/2017    Sig - Route: Infuse 0.5 mL into a " venous catheter As Needed (Per Heparin Nomogram For PTT 45-55). - Intravenous    Reason for Discontinue: Drug interaction    heparin (porcine) 5000 UNIT/ML injection 5,000 Units (Discontinued) 46.7 Units/kg × 107 kg As Needed 5/31/2017 6/1/2017    Sig - Route: Infuse 1 mL into a venous catheter As Needed (Per Heparin Nomogram For PTT Less Than 45). - Intravenous    Reason for Discontinue: Drug interaction    heparin 70096 units/250 mL (100 units/mL) in 0.45 % NaCl infusion (Discontinued) 9.35 Units/kg/hr × 81.1 kg (Adjusted) Titrated 5/31/2017 6/1/2017    Sig - Route: Infuse 758.285 Units/hr into a venous catheter Dose Adjusted By Provider As Needed. - Intravenous    sodium chloride 0.9 % infusion (Discontinued) 100 mL/hr Continuous 5/30/2017 6/2/2017    Sig - Route: Infuse 100 mL/hr into a venous catheter Continuous. - Intravenous            Cultures:    Blood Culture   Date Value Ref Range Status   05/31/2017 No growth at 24 hours  Preliminary   05/31/2017 No growth at 24 hours  Preliminary   05/30/2017 No growth at 2 days  Preliminary   05/30/2017 Gram positive cocci, consistent with Staph spp (A)  Preliminary       PROBLEM LIST:    Patient Active Problem List   Diagnosis   • Pneumonia   • Sepsis due to pneumonia       Assessment/Plan     ASSESSMENT:    1. Sepsis  2. Staph bacteremia  3. Pneumonia     PLAN:    Low-grade fever of 99.5 with one loose bowel movement overnight.  Off pressors but still on the ventilator.  Daughter bedside.  Normal white count.  Overall patient has shown improvement from infectious disease standpoint.    Blood cultures from 5/30/2017 still showing staph 1 set out of 2 which is most likely a contaminant in the setting of such slow growth.  Otherwise remaining cultures including BAL are showing no growth.  We will continue to monitor closely.  Hope to de-escalate soon if the patient continues to show persistent progress.      Patients findings and recommendations were discussed with  nursing staff    Code Status: Full Code     Scribed for Dr. Mercer by Rosalia Pinto PA-C.    Rosalia Pinot PA-C  06/07/17  8:48 AM    Physician Attestation:    I have personally seen and examined the patient. I reviewed the patient's data including history of present illness, review of systems, physical examination, assessment and treatment plan and agree with findings above. The assessment and plan are my own.  I have reviewed and edited the note above after discussing the findings with Rosalia Pinto PA-C.    James Mercer MD  Infectious Diseases  06/07/17  8:48 AM       Electronically signed by Rosalia Pinto PA-C at 6/7/2017  8:49 AM      Abdulkadir Peter MD at 6/7/2017 10:43 AM  Version 2 of 2            LOS: 8 days   Patient Care Team:  ERASTO Lee as PCP - General (Family Medicine)    Chief Complaint:  Pulmonology is following for pneumonia and respiratory failure    Subjective     Interval History: patient is intubated and sedated.  No acute events reported overnight.      History taken from: chart RN Patient unable to give history due to patient intubated.    Review of Systems:   Review of Systems - History obtained from chart review and unobtainable from patient due to mental status and Intubated      Objective     Vital Signs  Temp:  [98.7 °F (37.1 °C)-99.5 °F (37.5 °C)] 99.1 °F (37.3 °C)  Heart Rate:  [] 79  Resp:  [21-28] 23  BP: ()/(46-96) 143/72  FiO2 (%):  [21 %-25 %] 25 %  Body mass index is 34.52 kg/(m^2).    Intake/Output Summary (Last 24 hours) at 06/07/17 1043  Last data filed at 06/07/17 0956   Gross per 24 hour   Intake           2629.4 ml   Output             3205 ml   Net           -575.6 ml     I/O this shift:  In: 100 [IV Piggyback:100]  Out: 290 [Urine:290]    Physical Exam:  GENERAL APPEARANCE: Intubated and sedated.  Appears to be resting comfortably in bed.     HEAD: normocephalic.    EYES: PERRLA.    THROAT: ET tube in place. Oral cavity and pharynx  normal. No inflammation, swelling, exudate, or lesions.     NECK: Neck supple.     CARDIAC: Normal S1 and S2. No S3, S4 or murmurs. Rhythm is regular.     LUNGS: Clear to auscultation and percussion without rales, rhonchi, wheezing or diminished breath sounds.    ABDOMEN: Positive bowel sounds. Soft, nondistended, nontender.     EXTREMITIES: No significant deformity or joint abnormality. No edema. Peripheral pulses intact.    NEUROLOGICAL: Unable to assess due to sedation status.     PSYCHIATRIC: Unable to assess due to sedation status.     Results Review:     I reviewed the patient's new clinical results.  I reviewed the patient's new imaging results and agree with the interpretation.    Results from last 7 days  Lab Units 06/07/17  0100 06/06/17  0410 06/05/17  0134   WBC 10*3/mm3 9.41 8.90 8.67   HEMOGLOBIN g/dL 11.3* 10.7* 11.7*   PLATELETS 10*3/mm3 316 289 272       Results from last 7 days  Lab Units 06/07/17  0100 06/06/17 0410 06/05/17  0134   SODIUM mmol/L 144 145 142   POTASSIUM mmol/L 3.4* 3.7 4.9   CHLORIDE mmol/L 116* 116* 113*   TOTAL CO2 mmol/L 24.6 25.7 24.5   BUN mg/dL 25* 28* 24*   CREATININE mg/dL 0.54 0.50 0.58   CALCIUM mg/dL 9.2 9.3 9.5   GLUCOSE mg/dL 118* 140* 175*   MAGNESIUM mg/dL 2.2 2.3 2.4     Lab Results   Component Value Date    INR 1.02 06/01/2017    INR 1.03 05/31/2017    INR 0.98 05/31/2017    PROTIME 11.3 06/01/2017    PROTIME 11.5 05/31/2017    PROTIME 10.9 05/31/2017       Results from last 7 days  Lab Units 06/06/17  0410 06/05/17  0134 06/04/17  0355   ALK PHOS U/L 54 63 58   BILIRUBIN mg/dL 0.2 0.2 0.2   ALT (SGPT) U/L 18 18 14   AST (SGOT) U/L 25 24 22       Results from last 7 days  Lab Units 06/07/17  0439   PH, ARTERIAL pH units 7.354   PO2 ART mm Hg 61.8*   PCO2, ARTERIAL mm Hg 42.0   HCO3 ART mmol/L 22.9     Imaging Results (last 24 hours)     Procedure Component Value Units Date/Time    XR Chest AP [[id number]] Collected:  06/07/17 0941     Updated:  06/07/17 0947     Narrative:       XR CHEST AP-     CLINICAL INDICATION: Sedation in intubated patients.          COMPARISON: 06/06/2017.      TECHNIQUE: Single frontal view of the chest.     FINDINGS:     Slight improvement in aeration.  Mild pulmonary congestion.  There is no evidence of an acute osseous abnormality.   There are no suspicious-appearing parenchymal soft tissue nodules.            Impression:       Slight interval improvement.         This report was finalized on 6/7/2017 9:45 AM by Dr. Mauricio Santiago MD.                Medication Review:   Scheduled Medications:    acetylcysteine 600 mg Inhalation Q12H   aspirin 325 mg Oral Daily   atorvastatin 20 mg Oral Nightly   castor oil-balsam peru  Topical Q12H   enoxaparin 40 mg Subcutaneous Nightly   furosemide 60 mg Intravenous Q12H   ipratropium-albuterol 3 mL Nebulization 4x Daily - RT   metoclopramide 5 mg Intravenous TID   metroNIDAZOLE 500 mg Intravenous Q8H   pantoprazole 40 mg Intravenous Q AM   Pharmacy Meds to Bed Consult  Does not apply Daily   polyethylene glycol 17 g Oral Daily   potassium chloride 40 mEq Oral Once   [START ON 6/8/2017] predniSONE 10 mg Oral Daily With Breakfast   sodium chloride 10 mL Intracatheter Q12H   sodium chloride 10 mL Intracatheter Q12H   vancomycin 1,250 mg Intravenous Q12H     Continuous infusions:    propofol 5-50 mcg/kg/min Last Rate: 25 mcg/kg/min (06/07/17 1012)       Assessment/Plan     Neuro:Continue propofol and fentanyl IV per protocol for sedation.  Sedation vacation and breathing trial will be done today.    Respiratory: Respiratory failure likely due to pneumonia and history of smoking.      Vent settings:  RR 21 FiO2 25% PEEP 5     Patient was placed on a pressure support of 8 for spontaneous breathing trial.    She failed breathing trial after becoming tachypneic, and hypoxic.    She was placed back on current vent settings.    Decreased prednisone to 10 mg daily.    Continue IV antibiotics, scheduled inhalants and  PRN neb treatments.      ID: Continue IV antibiotics per infectious disease.  Discontinued cefepime. Cultures are negative.  Microbiology Results (last 10 days)     Procedure Component Value - Date/Time    Respiratory Culture [[id number]] Collected:  06/02/17 1024    Lab Status:  Final result Specimen:  Lavage from Lung, Right Middle Lobe Updated:  06/05/17 0940     Respiratory Culture No growth     Gram Stain Result No organisms seen    Respiratory Culture [[id number]] Collected:  06/02/17 1024    Lab Status:  Final result Specimen:  Lavage from Lung, Left Lower Lobe Updated:  06/05/17 0941     Respiratory Culture No growth     Gram Stain Result No organisms seen    Blood Culture [[id number]]  (Normal) Collected:  05/31/17 1550    Lab Status:  Final result Specimen:  Blood from Arm, Left Updated:  06/05/17 1701     Blood Culture No growth at 5 days    Blood Culture [[id number]]  (Normal) Collected:  05/31/17 1431    Lab Status:  Final result Specimen:  Blood from Hand, Right Updated:  06/05/17 1601     Blood Culture No growth at 5 days    Blood Culture [[id number]]  (Normal) Collected:  05/30/17 1727    Lab Status:  Final result Specimen:  Blood from Arm, Left Updated:  06/04/17 1801     Blood Culture No growth at 5 days    Blood Culture [[id number]]  (Abnormal) Collected:  05/30/17 1649    Lab Status:  Final result Specimen:  Blood from Arm, Left Updated:  06/05/17 1108     Blood Culture Staphylococcus, coagulase negative (A)      Probable contaminant.        Isolated from --     Gram Stain Result Gram positive cocci in clusters          Cardiac:    Hemodynamically Stable.  Continuous ECG and pulse ox monitoring.        Renal:BUN/Creatinine WNL.  Strict I&Os.   Ordered lasix 40 mg IV BID and potassium 40 mEq PO.      Endocrine: monitor glucose targeting 140-180.      Electrolytes:    Monitor levels, manage and replete per protocols.      GI: tube feedings are currently on hold for breathing trial.     Hematology:     Monitor blood counts, transfuse at or below 7 g/dL.  Unless patient is actively bleeding then began transfusing at 8 g/dL.       Patient Active Problem List   Diagnosis Code   • Pneumonia J18.9   • Sepsis due to pneumonia J18.9, A41.9         Bedside rounds were completed with RN and RRT, discussed case and plan in great detail.    Spoke with family member this morning- Daughter .  Discussed the case in detail- explained all the labs results and images. Discussed plan for the upcoming days and answered questions to their satisfaction.    ERASTO Mckeon  06/07/17  10:43 AM        Attestation: Scribed for Dr. Peter, ERASTO Bowden    I, Abdulkadir Peter M.D. attest that the above note accurately reflects the work and decisions made  by me.  Patient was seen and evaluated by Dr. Peter, including history of present illness, physical exam, assessment, and treatment plan.  The above note was reviewed and edited by Dr. Peter. The patient was  discussed with  Roney  and I agree with her history physical assessment and plan.  Critical Care time spent in direct patient care: 35 minutes (excluding procedure time, if applicable) including high complexity decision making to assess, manipulate, and support vital organ system failure in this individual who has impairment of one or more vital organ systems such that there is a high probability of imminent or life threatening deterioration in the patient’s condition.     Electronically signed by Abdulkadir Peter MD at 6/7/2017  2:48 PM      ERASTO Mckeon at 6/7/2017 10:43 AM  Version 1 of 2            LOS: 8 days   Patient Care Team:  ERASTO Lee as PCP - General (Family Medicine)    Chief Complaint:  Pulmonology is following for pneumonia and respiratory failure    Subjective     Interval History: patient is intubated and sedated.  No acute events reported overnight.      History taken from: chart RN Patient unable to give history due to  patient intubated.    Review of Systems:   Review of Systems - History obtained from chart review and unobtainable from patient due to mental status and Intubated      Objective     Vital Signs  Temp:  [98.7 °F (37.1 °C)-99.5 °F (37.5 °C)] 99.1 °F (37.3 °C)  Heart Rate:  [] 79  Resp:  [21-28] 23  BP: ()/(46-96) 143/72  FiO2 (%):  [21 %-25 %] 25 %  Body mass index is 34.52 kg/(m^2).    Intake/Output Summary (Last 24 hours) at 06/07/17 1043  Last data filed at 06/07/17 0956   Gross per 24 hour   Intake           2629.4 ml   Output             3205 ml   Net           -575.6 ml     I/O this shift:  In: 100 [IV Piggyback:100]  Out: 290 [Urine:290]    Physical Exam:  GENERAL APPEARANCE: Intubated and sedated.  Appears to be resting comfortably in bed.     HEAD: normocephalic.    EYES: PERRLA.    THROAT: ET tube in place. Oral cavity and pharynx normal. No inflammation, swelling, exudate, or lesions.     NECK: Neck supple.     CARDIAC: Normal S1 and S2. No S3, S4 or murmurs. Rhythm is regular. There is no peripheral edema, cyanosis or pallor. Extremities are warm and well perfused. Capillary refill is less than 2 seconds. No carotid bruits.    LUNGS: Clear to auscultation and percussion without rales, rhonchi, wheezing or diminished breath sounds.    ABDOMEN: Positive bowel sounds. Soft, nondistended, nontender.     EXTREMITIES: No significant deformity or joint abnormality. No edema. Peripheral pulses intact.    NEUROLOGICAL: Unable to assess due to sedation status.     PSYCHIATRIC: Unable to assess due to sedation status.     Results Review:     I reviewed the patient's new clinical results.  I reviewed the patient's new imaging results and agree with the interpretation.    Results from last 7 days  Lab Units 06/07/17  0100 06/06/17  0410 06/05/17  0134   WBC 10*3/mm3 9.41 8.90 8.67   HEMOGLOBIN g/dL 11.3* 10.7* 11.7*   PLATELETS 10*3/mm3 316 289 272       Results from last 7 days  Lab Units 06/07/17  0101  06/06/17  0410 06/05/17  0134   SODIUM mmol/L 144 145 142   POTASSIUM mmol/L 3.4* 3.7 4.9   CHLORIDE mmol/L 116* 116* 113*   TOTAL CO2 mmol/L 24.6 25.7 24.5   BUN mg/dL 25* 28* 24*   CREATININE mg/dL 0.54 0.50 0.58   CALCIUM mg/dL 9.2 9.3 9.5   GLUCOSE mg/dL 118* 140* 175*   MAGNESIUM mg/dL 2.2 2.3 2.4     Lab Results   Component Value Date    INR 1.02 06/01/2017    INR 1.03 05/31/2017    INR 0.98 05/31/2017    PROTIME 11.3 06/01/2017    PROTIME 11.5 05/31/2017    PROTIME 10.9 05/31/2017       Results from last 7 days  Lab Units 06/06/17  0410 06/05/17  0134 06/04/17  0355   ALK PHOS U/L 54 63 58   BILIRUBIN mg/dL 0.2 0.2 0.2   ALT (SGPT) U/L 18 18 14   AST (SGOT) U/L 25 24 22       Results from last 7 days  Lab Units 06/07/17  0439   PH, ARTERIAL pH units 7.354   PO2 ART mm Hg 61.8*   PCO2, ARTERIAL mm Hg 42.0   HCO3 ART mmol/L 22.9     Imaging Results (last 24 hours)     Procedure Component Value Units Date/Time    XR Chest AP [[id number]] Collected:  06/07/17 0941     Updated:  06/07/17 0947    Narrative:       XR CHEST AP-     CLINICAL INDICATION: Sedation in intubated patients.          COMPARISON: 06/06/2017.      TECHNIQUE: Single frontal view of the chest.     FINDINGS:     Slight improvement in aeration.  Mild pulmonary congestion.  There is no evidence of an acute osseous abnormality.   There are no suspicious-appearing parenchymal soft tissue nodules.            Impression:       Slight interval improvement.         This report was finalized on 6/7/2017 9:45 AM by Dr. Mauricio Santiago MD.                Medication Review:   Scheduled Medications:    acetylcysteine 600 mg Inhalation Q12H   aspirin 325 mg Oral Daily   atorvastatin 20 mg Oral Nightly   castor oil-balsam peru  Topical Q12H   enoxaparin 40 mg Subcutaneous Nightly   furosemide 60 mg Intravenous Q12H   ipratropium-albuterol 3 mL Nebulization 4x Daily - RT   metoclopramide 5 mg Intravenous TID   metroNIDAZOLE 500 mg Intravenous Q8H   pantoprazole 40  mg Intravenous Q AM   Pharmacy Meds to Bed Consult  Does not apply Daily   polyethylene glycol 17 g Oral Daily   potassium chloride 40 mEq Oral Once   [START ON 6/8/2017] predniSONE 10 mg Oral Daily With Breakfast   sodium chloride 10 mL Intracatheter Q12H   sodium chloride 10 mL Intracatheter Q12H   vancomycin 1,250 mg Intravenous Q12H     Continuous infusions:    propofol 5-50 mcg/kg/min Last Rate: 25 mcg/kg/min (06/07/17 1012)       Assessment/Plan     Neuro:Continue propofol and fentanyl IV per protocol for sedation.  Sedation vacation and breathing trial will be done today.    Respiratory: Respiratory failure likely due to pneumonia and history of smoking.      Vent settings:  RR 21 FiO2 25% PEEP 5     Patient was placed on a pressure support of 8 for spontaneous breathing trial.    She failed breathing trial after becoming tachypneic, and hypoxic.    She was placed back on current vent settings.    Decreased prednisone to 10 mg daily.    Continue IV antibiotics, scheduled inhalants and PRN neb treatments.      ID: Continue IV antibiotics per infectious disease.  Discontinued cefepime. Cultures are negative.    Cardiac:    Hemodynamically Stable.  Continuous ECG and pulse ox monitoring.        Renal:BUN/Creatinine WNL.  Strict I&Os.   Ordered lasix 40 mg IV BID and potassium 40 mEq PO.      Endocrine: monitor glucose targeting 140-180.      Electrolytes:    Monitor levels, manage and replete per protocols.      GI: tube feedings are currently on hold for breathing trial.     Hematology:    Monitor blood counts, transfuse at or below 7 g/dL.  Unless patient is actively bleeding then began transfusing at 8 g/dL.       Patient Active Problem List   Diagnosis Code   • Pneumonia J18.9   • Sepsis due to pneumonia J18.9, A41.9         Bedside rounds were completed with RN and RRT, discussed case and plan in great detail.    Spoke with family member this morning.  Discussed the case in detail- explained all the  labs results and images. Discussed plan for the upcoming days and answered questions to their satisfaction.    ERASTO Mckeon  06/07/17  10:43 AM        Attestation: Scribed for Kath Motley APRN       Electronically signed by ERASTO Mckeon at 6/7/2017 10:52 AM        Consult Notes (last 24 hours) (Notes from 6/6/2017  3:04 PM through 6/7/2017  3:04 PM)     No notes of this type exist for this encounter.

## 2017-06-07 NOTE — PROGRESS NOTES
[facility] HOSPITALIST PROGRESS NOTE     Patient Identification:  Name:  Mayra Hays  Age:  71 y.o.  Sex:  female  :  1945  MRN:  [id number]  Visit Number:  [id number]  Primary Care Provider:  ERASTO Lee    Length of stay:  8    CODE STATUS: Full Code    Chief complaint:  Shortness of air    Subjective:  72 yo admitted last night with shortness of air and respiratory failure from right middle lobe and right lower lobe pneumonia. She was intubated in the ED. Shortly afterwards, she had elevated troponins and was diagnosed with a NSTEMI. She remains intubated and cannot give me any history at this time. No family is at bedside. She was on dopamine briefly due to the need for sedation for the vent.  The patient then became bradycardic again on the night of 2017 through the morning of 2017; it was noted that she was bradycardic when she was turned to her right side and it resolves when she is turned to her left side.  Dopamine was not restarted even though it was ordered.  The patient tolerated about 30 minutes of CPAP yesterday.  The patient is currently on CPAP.    Procedures:  2017 intubated and mechanically ventilated  2017 bronchoscopy  2017 PICC right basilic vein  ----------------------------------------------------------------------------------------------------------------------  Current Hospital Meds:  acetylcysteine 600 mg Inhalation Q12H   aspirin 325 mg Oral Daily   atorvastatin 20 mg Oral Nightly   castor oil-balsam peru  Topical Q12H   cefepime 2 g Intravenous Q8H   enoxaparin 40 mg Subcutaneous Nightly   ipratropium-albuterol 3 mL Nebulization 4x Daily - RT   metoclopramide 5 mg Intravenous TID   metroNIDAZOLE 500 mg Intravenous Q8H   pantoprazole 40 mg Intravenous Q AM   Pharmacy Meds to Bed Consult  Does not apply Daily   polyethylene glycol 17 g Oral Daily   potassium chloride 40 mEq Oral Q4H   predniSONE 20 mg Oral Daily With Breakfast   sodium  chloride 10 mL Intracatheter Q12H   sodium chloride 10 mL Intracatheter Q12H   vancomycin 1,250 mg Intravenous Q12H     DOPamine 2-20 mcg/kg/min    propofol 5-50 mcg/kg/min Last Rate: Stopped (06/07/17 0703)   ----------------------------------------------------------------------------------------------------------------------  Vent Mode: VC/AC  FiO2 (%):  [21 %-25 %] 25 %  S RR:  [21] 21  PEEP/CPAP (cm H2O):  [5 cm H20] 5 cm H20  [place] SUP:  [8 cm H20] 8 cm H20  MAP (cm H2O):  [9.9-13] 12     The above are the vent settings before she was changed to CPAP with an FiO2 of 25%, PEEP of 5; the patient was pulling tidal volumes of 300 mL and had a respiratory rate in the 30s.    pH pH, Arterial   Date Value Ref Range Status   06/07/2017 7.354 7.350 - 7.450 pH units Final   06/06/2017 7.350 7.350 - 7.450 pH units Final   06/06/2017 7.370 7.350 - 7.450 pH units Final   06/05/2017 7.358 7.350 - 7.450 pH units Final   06/04/2017 7.356 7.350 - 7.450 pH units Final      pO2 pO2, Arterial   Date Value Ref Range Status   06/07/2017 61.8 (L) 80.0 - 100.0 mm Hg Final   06/06/2017 62.5 (L) 80.0 - 100.0 mm Hg Final   06/06/2017 86.2 80.0 - 100.0 mm Hg Final   06/05/2017 81.6 80.0 - 100.0 mm Hg Final   06/04/2017 95.2 80.0 - 100.0 mm Hg Final      pCO2 pCO2, Arterial   Date Value Ref Range Status   06/07/2017 42.0 35.0 - 45.0 mm Hg Final   06/06/2017 44.4 35.0 - 45.0 mm Hg Final   06/06/2017 38.1 35.0 - 45.0 mm Hg Final   06/05/2017 40.4 35.0 - 45.0 mm Hg Final   06/04/2017 41.9 35.0 - 45.0 mm Hg Final      HCO3 HCO3, Arterial   Date Value Ref Range Status   06/07/2017 22.9 22.0 - 26.0 mmol/L Final   06/06/2017 24.0 22.0 - 26.0 mmol/L Final   06/06/2017 21.5 (L) 22.0 - 26.0 mmol/L Final   06/05/2017 22.2 22.0 - 26.0 mmol/L Final   06/04/2017 22.9 22.0 - 26.0 mmol/L Final      Vital Signs:  Temp:  [98.7 °F (37.1 °C)-99.5 °F (37.5 °C)] 99.1 °F (37.3 °C)  Heart Rate:  [64-97] 79  Resp:  [21-28] 23  BP: ()/(46-96) 164/86  FiO2 (%):   [21 %-25 %] 25 %  Last 3 weights    06/05/17  0500 06/06/17  0500 06/07/17  0540   Weight: 228 lb (103 kg) 228 lb 4.8 oz (104 kg) 227 lb (103 kg)     Body mass index is 34.52 kg/(m^2).        Vital High Protein; Tube Feeding Type: Continuous; Continuous Tube Feeding Start Rate (mL/hr): 10; Then Advance Rate By (mL/hr): 10; Every __ Hours: 6; To Goal Rate of (mL/hr): 35; Patient is NPO (No Tray)  ----------------------------------------------------------------------------------------------------------------------  Physical exam:  Constitutional: Well-developed and well-nourished. Moderate to severe respiratory distress. Intubated with sedation being held for CPAP trial.   HENT: Head: Normocephalic and atraumatic.  Mouth: Moist mucous membranes.  Oral ETT and oral Gtube.  Eyes: Conjunctivae and EOM are normal. Pupils are equal, round, and reactive to light. No scleral icterus.  Neck: Neck supple. No JVD present.    Cardiovascular: Normal rate, regular rhythm and normal heart sounds with no murmur.  Pulmonary/Chest: Moderate to severe respiratory distress, no wheezes, diffuse rhonchi, no crackles, with normal breath sounds and fair air movement.  Abdominal: Soft. Bowel sounds are normal. No distension and no tenderness.   Musculoskeletal: No edema, no tenderness, and no deformity. No red or swollen joints anywhere.   Neurological: sedation was being held when I saw the patient.  She was oh to follow commands.  She can move her arms and legs equally on both sides.  She had no obvious facial droop.  The orientation questions could not be asked as she was still orally intubated.   Skin: Skin is warm and dry. No rash noted. No pallor.   Peripheral vascular: No edema and strong pulses on all 4 extremities.  Genitourinary: Shetty catheter in place, yellow urine in the collection container.  ----------------------------------------------------------------------------------------------------------------------  Tele:  sinus  tachycardia of 100's; I have personally reviewed/looked at the telemetry strips.  ----------------------------------------------------------------------------------------------------------------------  Results from last 7 days  Lab Units 06/07/17 0100 06/06/17 0410 06/05/17 0134 06/04/17  0355 06/01/17  0527   CRP mg/dL  --  1.64* 5.39* 8.09*  < > 10.96*   LACTATE mmol/L  --   --   --   --   --  1.5   WBC 10*3/mm3 9.41 8.90 8.67 7.33  < > 12.76*   HEMOGLOBIN g/dL 11.3* 10.7* 11.7* 10.4*  < > 11.4*   HEMATOCRIT % 37.0 35.5* 37.9 34.9*  < > 36.9*   MCV fL 86.4 86.6 86.5 88.4  < > 87.2   MCHC g/dL 30.5* 30.1* 30.9* 29.8*  < > 30.9*   PLATELETS 10*3/mm3 316 289 272 227  < > 203   INR   --   --   --   --   --  1.02   < > = values in this interval not displayed.    Results from last 7 days  Lab Units 06/07/17 0100 06/06/17 0410 06/05/17 0134 06/04/17  0355   SODIUM mmol/L 144 145 142 145   POTASSIUM mmol/L 3.4* 3.7 4.9 4.0   MAGNESIUM mg/dL 2.2 2.3 2.4 2.4   CHLORIDE mmol/L 116* 116* 113* 117*   TOTAL CO2 mmol/L 24.6 25.7 24.5 25.0   BUN mg/dL 25* 28* 24* 23*   CREATININE mg/dL 0.54 0.50 0.58 0.56   EGFR IF NONAFRICN AM mL/min/1.73 112 122 103 107   CALCIUM mg/dL 9.2 9.3 9.5 9.1   GLUCOSE mg/dL 118* 140* 175* 94   ALBUMIN g/dL  --  3.10* 3.40 3.10*   BILIRUBIN mg/dL  --  0.2 0.2 0.2   ALK PHOS U/L  --  54 63 58   AST (SGOT) U/L  --  25 24 22   ALT (SGPT) U/L  --  18 18 14   Estimated Creatinine Clearance: 82.1 mL/min (by C-G formula based on Cr of 0.54).    Blood Culture   Date Value Ref Range Status   05/31/2017 No growth at 5 days  Final   05/31/2017 No growth at 5 days  Final   ----------------------------------------------------------------------------------------------------------------------  Imaging Results (last 24 hours)     Procedure Component Value Units Date/Time    XR Chest AP [[id number]] Collected:  06/06/17 0854     Updated:  06/06/17 0937    COMPARISON: 06/05/2017   FINDINGS:  Bibasilar effusions  and right basilar consolidation.  Endotracheal tube overlies the tracheal air column above the nadine.  The cardiac silhouette is normal. The pulmonary vasculature is unremarkable.  There is no evidence of an acute osseous abnormality.   There are no suspicious-appearing parenchymal soft tissue nodules.    Impression:       Bibasilar effusions and right basilar consolidation.      This report was finalized on 6/6/2017 9:35 AM by Dr. Mauricio Santiago MD.         XR Chest AP [[id number]] Updated:  06/07/17 0630        I have personally looked at the CXR and the infiltrates on the right look improved.            I have personally looked at the radiology images and read the final radiology report.  ----------------------------------------------------------------------------------------------------------------------  Assessment and Plan:  -Acute hypoxic and hypercapnic respiratory failure and sepsis due to right middle lobe and right lower lobe pneumonia  -Primary respiratory acidosis and a secondary compensatory metabolic alkalosis, now improved  -Bilateral pleural effusions  -Coagulative negative staphylococcus in one blood culture that could be a contaminant  -Tachybrady syndrome  -Lactic acidosis that has resolved  -Prolonged QTc of 559 ms, down to 511 ms by 6/2/2017   -Sinus bradycardia, now resolved  -Elevated troponins concerning for type 2 myocardial infarction/NSTEMI, but Dr. Awan does not think that she had a NSTEMI  -Type 2 diabetes mellitus (Ha1c 7.3 this admission), newly diagnosed this admission  -Diastolic congestive heart failure with a mild exacerbation due to needed medical care, now resolved  -Mild pulmonary hypertension  -Hyperglycemia, no history of diabetes mellitus  -Acute COPD exacerbation, improved  -Elevated liver enzymes, resolved  -Morbid obesity  -Possible acute renal failure on top of chronic kidney disease Stage 3 (baseline Cr now 0.6 and previously thought to be 0.94-1.02)  -Mild  hyperkalemia that has now turned to mild hypokalemia  -Past heavy tobacco smoking addiction    The patient is to receive daily CPAP trials as we anticipate her being extubated in the next few days.  We will replace her potassium per protocol.  I'll repeat her blood work in the morning.  For now, we will continue the Flagyl and vancomycin.  Will obtain a [facility] consult for possible transfer.  Will obtain ABG in the a.m.    The patient is high risk due to the following diagnoses/reasons:  Sepsis and acute respiratory failure    Todd Trammell MD  06/07/17  8:25 AM

## 2017-06-08 ENCOUNTER — HOSPITAL ENCOUNTER (OUTPATIENT)
Facility: HOSPITAL | Age: 72
Discharge: HOME | End: 2017-06-27
Attending: INTERNAL MEDICINE | Admitting: INTERNAL MEDICINE

## 2017-06-08 ENCOUNTER — APPOINTMENT (OUTPATIENT)
Dept: GENERAL RADIOLOGY | Facility: HOSPITAL | Age: 72
End: 2017-06-08

## 2017-06-08 VITALS
WEIGHT: 227 LBS | RESPIRATION RATE: 21 BRPM | TEMPERATURE: 98 F | SYSTOLIC BLOOD PRESSURE: 125 MMHG | HEIGHT: 68 IN | DIASTOLIC BLOOD PRESSURE: 72 MMHG | BODY MASS INDEX: 34.4 KG/M2 | OXYGEN SATURATION: 93 % | HEART RATE: 81 BPM

## 2017-06-08 LAB
A-A DO2: 78.7 MMHG (ref 0–300)
A-A DO2: 83.2 MMHG (ref 0–300)
ALBUMIN SERPL-MCNC: 3.5 G/DL (ref 3.4–4.8)
ALBUMIN/GLOB SERPL: 0.9 G/DL (ref 1.5–2.5)
ALP SERPL-CCNC: 60 U/L (ref 35–104)
ALT SERPL W P-5'-P-CCNC: 25 U/L (ref 10–36)
AMORPH URATE CRY URNS QL MICRO: ABNORMAL /HPF
ANION GAP SERPL CALCULATED.3IONS-SCNC: 6.6 MMOL/L (ref 3.6–11.2)
ARTERIAL PATENCY WRIST A: ABNORMAL
ARTERIAL PATENCY WRIST A: POSITIVE
AST SERPL-CCNC: 16 U/L (ref 10–30)
ATMOSPHERIC PRESS: 725 MMHG
ATMOSPHERIC PRESS: 725 MMHG
BACTERIA UR QL AUTO: ABNORMAL /HPF
BASE EXCESS BLDA CALC-SCNC: -0.5 MMOL/L
BASE EXCESS BLDA CALC-SCNC: 0.6 MMOL/L
BASOPHILS # BLD AUTO: 0.11 10*3/MM3 (ref 0–0.3)
BASOPHILS NFR BLD AUTO: 1.1 % (ref 0–2)
BDY SITE: ABNORMAL
BDY SITE: ABNORMAL
BILIRUB SERPL-MCNC: 0.2 MG/DL (ref 0.2–1.8)
BILIRUB UR QL STRIP: NEGATIVE
BODY TEMPERATURE: 98.6 C
BODY TEMPERATURE: 98.6 C
BUN BLD-MCNC: 29 MG/DL (ref 7–21)
BUN/CREAT SERPL: 47.5 (ref 7–25)
CALCIUM SPEC-SCNC: 9.7 MG/DL (ref 7.7–10)
CHLORIDE SERPL-SCNC: 112 MMOL/L (ref 99–112)
CLARITY UR: ABNORMAL
CO2 SERPL-SCNC: 27.4 MMOL/L (ref 24.3–31.9)
COHGB MFR BLD: 1.1 % (ref 0–5)
COHGB MFR BLD: 1.2 % (ref 0–5)
COLOR UR: ABNORMAL
CREAT BLD-MCNC: 0.61 MG/DL (ref 0.43–1.29)
CRP SERPL-MCNC: 0.55 MG/DL (ref 0–0.99)
DEPRECATED RDW RBC AUTO: 41.6 FL (ref 37–54)
EOSINOPHIL # BLD AUTO: 0.4 10*3/MM3 (ref 0–0.7)
EOSINOPHIL NFR BLD AUTO: 4 % (ref 0–7)
ERYTHROCYTE [DISTWIDTH] IN BLOOD BY AUTOMATED COUNT: 13.4 % (ref 11.5–14.5)
GFR SERPL CREATININE-BSD FRML MDRD: 97 ML/MIN/1.73
GLOBULIN UR ELPH-MCNC: 3.8 GM/DL
GLUCOSE BLD-MCNC: 137 MG/DL (ref 70–110)
GLUCOSE BLDC GLUCOMTR-MCNC: 167 MG/DL (ref 70–130)
GLUCOSE UR STRIP-MCNC: NEGATIVE MG/DL
HCO3 BLDA-SCNC: 24.6 MMOL/L (ref 22–26)
HCO3 BLDA-SCNC: 26.3 MMOL/L (ref 22–26)
HCT VFR BLD AUTO: 39 % (ref 37–47)
HCT VFR BLD CALC: 38 % (ref 37–47)
HCT VFR BLD CALC: 41 % (ref 37–47)
HGB BLD-MCNC: 11.9 G/DL (ref 12–16)
HGB BLDA-MCNC: 12.8 G/DL (ref 12–16)
HGB BLDA-MCNC: 14 G/DL (ref 12–16)
HGB UR QL STRIP.AUTO: NEGATIVE
HOROWITZ INDEX BLD+IHG-RTO: 30 %
HOROWITZ INDEX BLD+IHG-RTO: 30 %
HYALINE CASTS UR QL AUTO: ABNORMAL /LPF
IMM GRANULOCYTES # BLD: 0.38 10*3/MM3 (ref 0–0.03)
IMM GRANULOCYTES NFR BLD: 3.8 % (ref 0–0.5)
KETONES UR QL STRIP: ABNORMAL
LEUKOCYTE ESTERASE UR QL STRIP.AUTO: ABNORMAL
LYMPHOCYTES # BLD AUTO: 1.89 10*3/MM3 (ref 1–3)
LYMPHOCYTES NFR BLD AUTO: 18.8 % (ref 16–46)
MAGNESIUM SERPL-MCNC: 2.1 MG/DL (ref 1.7–2.6)
MCH RBC QN AUTO: 26.2 PG (ref 27–33)
MCHC RBC AUTO-ENTMCNC: 30.5 G/DL (ref 33–37)
MCV RBC AUTO: 85.7 FL (ref 80–94)
METHGB BLD QL: 0.3 % (ref 0–3)
METHGB BLD QL: 0.3 % (ref 0–3)
MODALITY: ABNORMAL
MODALITY: ABNORMAL
MONOCYTES # BLD AUTO: 1.03 10*3/MM3 (ref 0.1–0.9)
MONOCYTES NFR BLD AUTO: 10.2 % (ref 0–12)
NEUTROPHILS # BLD AUTO: 6.26 10*3/MM3 (ref 1.4–6.5)
NEUTROPHILS NFR BLD AUTO: 62.1 % (ref 40–75)
NITRITE UR QL STRIP: POSITIVE
OSMOLALITY SERPL CALC.SUM OF ELEC: 298.5 MOSM/KG (ref 273–305)
OXYHGB MFR BLDV: 91.7 % (ref 85–100)
OXYHGB MFR BLDV: 91.8 % (ref 85–100)
PCO2 BLDA: 41.9 MM HG (ref 35–45)
PCO2 BLDA: 46.6 MM HG (ref 35–45)
PEEP RESPIRATORY: 5 CM[H2O]
PEEP RESPIRATORY: 5 CM[H2O]
PH BLDA: 7.37 PH UNITS (ref 7.35–7.45)
PH BLDA: 7.39 PH UNITS (ref 7.35–7.45)
PH UR STRIP.AUTO: <=5 [PH] (ref 5–8)
PHOSPHATE SERPL-MCNC: 4.2 MG/DL (ref 2.7–4.5)
PLATELET # BLD AUTO: 354 10*3/MM3 (ref 130–400)
PMV BLD AUTO: 11.8 FL (ref 6–10)
PO2 BLDA: 69.9 MM HG (ref 80–100)
PO2 BLDA: 71 MM HG (ref 80–100)
POTASSIUM BLD-SCNC: 3.8 MMOL/L (ref 3.5–5.3)
PROT SERPL-MCNC: 7.3 G/DL (ref 6–8)
PROT UR QL STRIP: ABNORMAL
RBC # BLD AUTO: 4.55 10*6/MM3 (ref 4.2–5.4)
RBC # UR: ABNORMAL /HPF
REF LAB TEST METHOD: ABNORMAL
SAO2 % BLDCOA: 93 % (ref 90–100)
SAO2 % BLDCOA: 93.2 % (ref 90–100)
SET MECH RESP RATE: 21
SODIUM BLD-SCNC: 146 MMOL/L (ref 135–153)
SP GR UR STRIP: >=1.03 (ref 1–1.03)
SQUAMOUS #/AREA URNS HPF: ABNORMAL /HPF
UROBILINOGEN UR QL STRIP: ABNORMAL
VENTILATOR MODE: ABNORMAL
VENTILATOR MODE: AC
VT ON VENT VENT: 400 ML
WBC NRBC COR # BLD: 10.07 10*3/MM3 (ref 4.5–12.5)
WBC UR QL AUTO: ABNORMAL /HPF

## 2017-06-08 PROCEDURE — 82375 ASSAY CARBOXYHB QUANT: CPT | Performed by: INTERNAL MEDICINE

## 2017-06-08 PROCEDURE — 71010 XR CHEST 1 VW: CPT | Performed by: RADIOLOGY

## 2017-06-08 PROCEDURE — 94799 UNLISTED PULMONARY SVC/PX: CPT

## 2017-06-08 PROCEDURE — 94003 VENT MGMT INPAT SUBQ DAY: CPT

## 2017-06-08 PROCEDURE — 82805 BLOOD GASES W/O2 SATURATION: CPT | Performed by: INTERNAL MEDICINE

## 2017-06-08 PROCEDURE — 81001 URINALYSIS AUTO W/SCOPE: CPT | Performed by: INTERNAL MEDICINE

## 2017-06-08 PROCEDURE — 25010000002 METOCLOPRAMIDE PER 10 MG: Performed by: INTERNAL MEDICINE

## 2017-06-08 PROCEDURE — 63710000001 PREDNISONE PER 5 MG: Performed by: INTERNAL MEDICINE

## 2017-06-08 PROCEDURE — 93005 ELECTROCARDIOGRAM TRACING: CPT | Performed by: INTERNAL MEDICINE

## 2017-06-08 PROCEDURE — 87205 SMEAR GRAM STAIN: CPT | Performed by: INTERNAL MEDICINE

## 2017-06-08 PROCEDURE — 99291 CRITICAL CARE FIRST HOUR: CPT | Performed by: INTERNAL MEDICINE

## 2017-06-08 PROCEDURE — 87070 CULTURE OTHR SPECIMN AEROBIC: CPT | Performed by: INTERNAL MEDICINE

## 2017-06-08 PROCEDURE — 25010000002 FUROSEMIDE PER 20 MG: Performed by: INTERNAL MEDICINE

## 2017-06-08 PROCEDURE — 71010 HC CHEST PA OR AP: CPT

## 2017-06-08 PROCEDURE — 25010000002 PROPOFOL 1000 MG/ML EMULSION: Performed by: INTERNAL MEDICINE

## 2017-06-08 PROCEDURE — 84134 ASSAY OF PREALBUMIN: CPT | Performed by: INTERNAL MEDICINE

## 2017-06-08 PROCEDURE — 83050 HGB METHEMOGLOBIN QUAN: CPT | Performed by: INTERNAL MEDICINE

## 2017-06-08 PROCEDURE — 93010 ELECTROCARDIOGRAM REPORT: CPT | Performed by: INTERNAL MEDICINE

## 2017-06-08 PROCEDURE — 82962 GLUCOSE BLOOD TEST: CPT

## 2017-06-08 PROCEDURE — 85025 COMPLETE CBC W/AUTO DIFF WBC: CPT | Performed by: INTERNAL MEDICINE

## 2017-06-08 PROCEDURE — 83735 ASSAY OF MAGNESIUM: CPT | Performed by: INTERNAL MEDICINE

## 2017-06-08 PROCEDURE — 99239 HOSP IP/OBS DSCHRG MGMT >30: CPT | Performed by: INTERNAL MEDICINE

## 2017-06-08 PROCEDURE — 25010000002 VANCOMYCIN PER 500 MG: Performed by: INTERNAL MEDICINE

## 2017-06-08 PROCEDURE — 36600 WITHDRAWAL OF ARTERIAL BLOOD: CPT | Performed by: INTERNAL MEDICINE

## 2017-06-08 PROCEDURE — 86140 C-REACTIVE PROTEIN: CPT | Performed by: INTERNAL MEDICINE

## 2017-06-08 PROCEDURE — 80053 COMPREHEN METABOLIC PANEL: CPT | Performed by: INTERNAL MEDICINE

## 2017-06-08 PROCEDURE — 99233 SBSQ HOSP IP/OBS HIGH 50: CPT | Performed by: INTERNAL MEDICINE

## 2017-06-08 PROCEDURE — 84100 ASSAY OF PHOSPHORUS: CPT | Performed by: INTERNAL MEDICINE

## 2017-06-08 RX ORDER — SODIUM CHLORIDE 0.9 % (FLUSH) 0.9 %
10 SYRINGE (ML) INJECTION AS NEEDED
Start: 2017-06-08 | End: 2017-07-20 | Stop reason: ALTCHOICE

## 2017-06-08 RX ORDER — ACETYLCYSTEINE 200 MG/ML
600 SOLUTION ORAL; RESPIRATORY (INHALATION) EVERY 12 HOURS SCHEDULED
Start: 2017-06-08 | End: 2017-06-11

## 2017-06-08 RX ORDER — SODIUM CHLORIDE 0.9 % (FLUSH) 0.9 %
1-10 SYRINGE (ML) INJECTION AS NEEDED
Start: 2017-06-08 | End: 2017-07-20 | Stop reason: ALTCHOICE

## 2017-06-08 RX ORDER — PREDNISONE 10 MG/1
10 TABLET ORAL
Start: 2017-06-08 | End: 2017-07-20 | Stop reason: ALTCHOICE

## 2017-06-08 RX ORDER — DEXTROSE MONOHYDRATE 25 G/50ML
25 INJECTION, SOLUTION INTRAVENOUS
Start: 2017-06-08 | End: 2017-07-20 | Stop reason: ALTCHOICE

## 2017-06-08 RX ORDER — SODIUM CHLORIDE 0.9 % (FLUSH) 0.9 %
10 SYRINGE (ML) INJECTION EVERY 12 HOURS SCHEDULED
Start: 2017-06-08 | End: 2017-07-20 | Stop reason: ALTCHOICE

## 2017-06-08 RX ORDER — ATORVASTATIN CALCIUM 20 MG/1
20 TABLET, FILM COATED ORAL NIGHTLY
Start: 2017-06-08 | End: 2017-08-14

## 2017-06-08 RX ORDER — NICOTINE POLACRILEX 4 MG
15 LOZENGE BUCCAL
Start: 2017-06-08 | End: 2017-07-20 | Stop reason: ALTCHOICE

## 2017-06-08 RX ORDER — POLYETHYLENE GLYCOL 3350 17 G/17G
17 POWDER, FOR SOLUTION ORAL DAILY
Start: 2017-06-08 | End: 2017-07-20 | Stop reason: ALTCHOICE

## 2017-06-08 RX ORDER — PANTOPRAZOLE SODIUM 40 MG/10ML
40 INJECTION, POWDER, LYOPHILIZED, FOR SOLUTION INTRAVENOUS
Start: 2017-06-08 | End: 2017-07-20 | Stop reason: ALTCHOICE

## 2017-06-08 RX ORDER — POTASSIUM CHLORIDE 750 MG/1
40 CAPSULE, EXTENDED RELEASE ORAL AS NEEDED
Start: 2017-06-08 | End: 2017-07-20 | Stop reason: ALTCHOICE

## 2017-06-08 RX ORDER — ASPIRIN 325 MG
325 TABLET ORAL DAILY
Start: 2017-06-08 | End: 2017-08-14 | Stop reason: DRUGHIGH

## 2017-06-08 RX ORDER — MAGNESIUM SULFATE HEPTAHYDRATE 40 MG/ML
4 INJECTION, SOLUTION INTRAVENOUS AS NEEDED
Status: ON HOLD
Start: 2017-06-08 | End: 2017-08-22

## 2017-06-08 RX ORDER — POTASSIUM CHLORIDE 7.45 MG/ML
10 INJECTION INTRAVENOUS
Start: 2017-06-08 | End: 2017-07-20 | Stop reason: ALTCHOICE

## 2017-06-08 RX ORDER — IPRATROPIUM BROMIDE AND ALBUTEROL SULFATE 2.5; .5 MG/3ML; MG/3ML
3 SOLUTION RESPIRATORY (INHALATION)
Qty: 360 ML
Start: 2017-06-08 | End: 2017-07-20 | Stop reason: ALTCHOICE

## 2017-06-08 RX ORDER — MAGNESIUM SULFATE HEPTAHYDRATE 40 MG/ML
2 INJECTION, SOLUTION INTRAVENOUS AS NEEDED
Start: 2017-06-08 | End: 2017-07-20 | Stop reason: ALTCHOICE

## 2017-06-08 RX ORDER — METOCLOPRAMIDE HYDROCHLORIDE 5 MG/ML
5 INJECTION INTRAMUSCULAR; INTRAVENOUS 3 TIMES DAILY
Status: ON HOLD
Start: 2017-06-08 | End: 2017-08-22

## 2017-06-08 RX ORDER — MAGNESIUM SULFATE HEPTAHYDRATE 40 MG/ML
2 INJECTION, SOLUTION INTRAVENOUS AS NEEDED
Status: ON HOLD
Start: 2017-06-08 | End: 2017-08-22

## 2017-06-08 RX ORDER — FUROSEMIDE 10 MG/ML
60 INJECTION INTRAMUSCULAR; INTRAVENOUS EVERY 12 HOURS SCHEDULED
Start: 2017-06-08 | End: 2017-07-20 | Stop reason: ALTCHOICE

## 2017-06-08 RX ORDER — POTASSIUM CHLORIDE 1.5 G/1.77G
40 POWDER, FOR SOLUTION ORAL AS NEEDED
Status: ON HOLD
Start: 2017-06-08 | End: 2017-08-22

## 2017-06-08 RX ORDER — CASTOR OIL AND BALSAM, PERU 788; 87 MG/G; MG/G
1 OINTMENT TOPICAL EVERY 12 HOURS SCHEDULED
Start: 2017-06-08 | End: 2017-07-20 | Stop reason: ALTCHOICE

## 2017-06-08 RX ADMIN — PREDNISONE 10 MG: 10 TABLET ORAL at 08:53

## 2017-06-08 RX ADMIN — POLYETHYLENE GLYCOL 3350 17 G: 17 POWDER, FOR SOLUTION ORAL at 08:53

## 2017-06-08 RX ADMIN — CASTOR OIL AND BALSAM, PERU: 788; 87 OINTMENT TOPICAL at 08:55

## 2017-06-08 RX ADMIN — FUROSEMIDE 60 MG: 10 INJECTION, SOLUTION INTRAMUSCULAR; INTRAVENOUS at 08:53

## 2017-06-08 RX ADMIN — IPRATROPIUM BROMIDE AND ALBUTEROL SULFATE 3 ML: .5; 3 SOLUTION RESPIRATORY (INHALATION) at 00:38

## 2017-06-08 RX ADMIN — Medication 10 ML: at 08:53

## 2017-06-08 RX ADMIN — VANCOMYCIN HYDROCHLORIDE 1250 MG: 5 INJECTION, POWDER, LYOPHILIZED, FOR SOLUTION INTRAVENOUS at 05:15

## 2017-06-08 RX ADMIN — METRONIDAZOLE 500 MG: 500 INJECTION, SOLUTION INTRAVENOUS at 08:52

## 2017-06-08 RX ADMIN — ASPIRIN 325 MG: 325 TABLET ORAL at 08:53

## 2017-06-08 RX ADMIN — PROPOFOL 30 MCG/KG/MIN: 10 INJECTION, EMULSION INTRAVENOUS at 12:56

## 2017-06-08 RX ADMIN — ACETYLCYSTEINE: 200 SOLUTION ORAL; RESPIRATORY (INHALATION) at 07:06

## 2017-06-08 RX ADMIN — METOCLOPRAMIDE 5 MG: 5 INJECTION, SOLUTION INTRAMUSCULAR; INTRAVENOUS at 08:52

## 2017-06-08 RX ADMIN — IPRATROPIUM BROMIDE AND ALBUTEROL SULFATE 3 ML: .5; 3 SOLUTION RESPIRATORY (INHALATION) at 12:11

## 2017-06-08 RX ADMIN — PANTOPRAZOLE SODIUM 40 MG: 40 INJECTION, POWDER, FOR SOLUTION INTRAVENOUS at 05:15

## 2017-06-08 RX ADMIN — PROPOFOL 35 MCG/KG/MIN: 10 INJECTION, EMULSION INTRAVENOUS at 01:56

## 2017-06-08 RX ADMIN — IPRATROPIUM BROMIDE AND ALBUTEROL SULFATE 3 ML: .5; 3 SOLUTION RESPIRATORY (INHALATION) at 07:06

## 2017-06-08 NOTE — PLAN OF CARE
Problem: Skin Integrity Impairment, Risk/Actual (Adult)  Goal: Identify Related Risk Factors and Signs and Symptoms  Outcome: Ongoing (interventions implemented as appropriate)  Goal: Skin Integrity/Wound Healing  Outcome: Unable to achieve outcome(s) by discharge Date Met:  06/08/17    Problem: Pneumonia (Adult)  Goal: Signs and Symptoms of Listed Potential Problems Will be Absent or Manageable (Pneumonia)  Outcome: Ongoing (interventions implemented as appropriate)    Problem: Patient Care Overview (Adult)  Goal: Plan of Care Review  Outcome: Ongoing (interventions implemented as appropriate)  Goal: Adult Individualization and Mutuality  Outcome: Ongoing (interventions implemented as appropriate)  Goal: Discharge Needs Assessment  Outcome: Ongoing (interventions implemented as appropriate)    Problem: Fall Risk (Adult)  Goal: Identify Related Risk Factors and Signs and Symptoms  Outcome: Ongoing (interventions implemented as appropriate)  Goal: Absence of Falls  Outcome: Outcome(s) achieved Date Met:  06/08/17    Problem: Pressure Ulcer (Adult)  Goal: Signs and Symptoms of Listed Potential Problems Will be Absent or Manageable (Pressure Ulcer)  Outcome: Unable to achieve outcome(s) by discharge Date Met:  06/08/17

## 2017-06-08 NOTE — PROGRESS NOTES
Discharge Planning Assessment   Pasquale     Patient Name: Mayra Hays  MRN: [id number]  Today's Date: 6/8/2017    Admit Date: 5/30/2017          Discharge Plan       06/08/17 1122    Final Note    Final Note SS received a call from Jaqueline at [facility] and she is agreeable to accept the pt on this date. Pt will be accepted by Dr. Peter.  SS contacted pt's daughter, Hailey and she is agreeable for the pt to be transferred to [facility].  SS faxed an order to [facility] at [phone].  Nurse please call report to [facility] upon discharge. No other needs at this time.              Expected Discharge Date and Time     Expected Discharge Date Expected Discharge Time    Jun 8, 2017         Leah Olivarez

## 2017-06-08 NOTE — DISCHARGE SUMMARY
[facility] MEDICINE DISCHARGE SUMMARY    Patient Identification:  Name:  Mayra Hays  Age:  71 y.o.  Sex:  female  :  1945  MRN:  [id number]  Visit Number:  [id number]    Date of Admission: 2017  Date of Discharge:  2017     PCP: ERASTO Lee    DISCHARGE DIAGNOSIS  -Acute hypoxic and hypercapnic respiratory failure and sepsis due to right middle lobe and right lower lobe pneumonia  -Primary respiratory acidosis and a secondary compensatory metabolic alkalosis, now improved  -Bilateral pleural effusions  -Coagulative negative staphylococcus in one blood culture that could be a contaminant  -Tachybrady syndrome  -Lactic acidosis that has resolved  -Prolonged QTc of 559 ms, down to 511 ms by 2017   -Sinus bradycardia, now resolved  -Elevated troponins concerning for type 2 myocardial infarction/NSTEMI, but Dr. Awan does not think that she had a NSTEMI  -Type 2 diabetes mellitus (Ha1c 7.3 this admission), newly diagnosed this admission  -Diastolic congestive heart failure with a mild exacerbation due to needed medical care, now resolved  -Mild pulmonary hypertension  -Acute COPD exacerbation, improved  -Elevated liver enzymes, resolved  -Morbid obesity  -Possible acute renal failure on top of chronic kidney disease Stage 3 (baseline Cr now 0.6 and previously thought to be 0.94-1.02)  -Mild hyperkalemia that has now turned to mild hypokalemia  -Past heavy tobacco smoking addiction    CONSULTS   Dr. Peter with pulmonology, Dr. Mercer with infectious disease, Dr. Mae and Dr. Awan with cardiology    PROCEDURES PERFORMED  2017 intubated and mechanically ventilated  2017 bronchoscopy  2017 PICC double lumen 5 Danish right basilic vein    Ventilator settings:  Vent Mode: VC/AC  FiO2 (%): [25 %-30 %] 30 %  S RR: [21] 21  PEEP/CPAP (cm H2O): [5 cm H20] 5 cm H20  MAP (cm H2O): [10-14] 11    HOSPITAL COURSE  Patient is a 71 y.o. female presented to  [facility] complaining of shortness of air; she was found to have a right middle lobe pneumonia causing sepsis and acute hypoxic and hypercapnic respiratory failure.  She was admitted to the critical care unit  Please see the admitting history and physical for further details.  Cultures were obtained and she was started on flagyl, cefepime, doxycycline, solumedrol, and breathing treatments; she was intubated in the ED.  She had elevated troponins on admission and was started on aspirin and a heparin drip.  Pulmonology and cardiology were consulted.  The patient was diagnosed with a type 2 MI and cardiology added atorvastatin; metoprolol could not be added due to bradycardia.  Dopamine was added temporarily for the sinus bradycardia when he was first admitted.  Tube feeds (Vital high protein) were started while she was intubated and adjusted accordingly; Reglan was added to promote gut motility.  Pulmonology was able to start daily CPAP trials by the time she was transferred to [facility].  She did receive diuresis at different intervals while hospitalized for fluid overload during treatment.      The patient will need to have a cardiac work up once she recovers completely from the pneumonia.    She is being transferred to [facility] with Dr. Peter as attending.    VITAL SIGNS:  Last 3 weights    06/06/17  0500 06/07/17  0540 06/08/17  0400   Weight: 228 lb 4.8 oz (104 kg) 227 lb (103 kg) 227 lb (103 kg)     Body mass index is 34.52 kg/(m^2).    PHYSICAL EXAM:  Constitutional: Well-developed and well-nourished. Moderate to severe respiratory distress. Intubated with sedation being held but currently on the vent in CPAP mode.   HENT: Head: Normocephalic and atraumatic.  Mouth: Moist mucous membranes.  Oral ETT and oral Gtube.  Eyes: Conjunctivae and EOM are normal. Pupils are equal, round, and reactive to light. No scleral icterus.  Neck: Neck supple. No JVD present.     Cardiovascular: Normal rate, regular rhythm and normal heart sounds with no murmur.  Pulmonary/Chest: Moderate to severe respiratory distress, no wheezes, diffuse rhonchi, no crackles, with normal breath sounds and fair air movement.  Abdominal: Soft. Bowel sounds are normal. No distension and no tenderness.   Musculoskeletal: No edema, no tenderness, and no deformity. No red or swollen joints anywhere.   Neurological: sedation was being held when I saw the patient. She was able to follow commands. She can move her arms and legs equally on both sides. She had no obvious facial droop. The orientation questions could not be asked as she was still orally intubated.   Skin: Skin is warm and dry. No rash noted. No pallor.   Peripheral vascular: No edema and strong pulses on all 4 extremities.  Genitourinary: Shetty catheter in place, yellow urine in the collection container.    DISCHARGE DISPOSITION   Stable    DISCHARGE MEDICATIONS:  acetylcysteine (MUCOMYST) 20 % nebulizer solution  Take 3 mL by nebulization Every 12 (Twelve) Hours for 3 days.   aspirin 325 MG tablet  Take 1 tablet by mouth Daily.   atorvastatin (LIPITOR) 20 MG tablet  Take 1 tablet by mouth Every Night.   castor oil-balsam peru (VENELEX) ointment  Apply 1 application topically Every 12 (Twelve) Hours.   dextrose (D50W) 50 % solution  Infuse 50 mL into a venous catheter Every 15 (Fifteen) Minutes As Needed for Low Blood Sugar (Blood Sugar Less Than 70, Patient Has IV).   dextrose (GLUTOSE) 40 % gel  Take 15 g by mouth Every 15 (Fifteen) Minutes As Needed for Low Blood Sugar (Blood Sugar Less Than 70).   enoxaparin (LOVENOX) 40 MG/0.4ML solution syringe  Inject 0.4 mL under the skin Every Night.   furosemide (LASIX) 10 MG/ML injection  Infuse 6 mL into a venous catheter Every 12 (Twelve) Hours.   glucagon, human recombinant, (GLUCAGEN DIAGNOSTIC) 1 MG injection  Inject 1 mg under the skin Every 15 (Fifteen) Minutes As Needed (Blood Glucose Less Than  70).   ipratropium-albuterol (DUO-NEB) 0.5-2.5 mg/mL nebulizer  Take 3 mL by nebulization 4 (Four) Times a Day.   magnesium sulfate 2 GM/50ML infusion  Infuse 50 mL into a venous catheter As Needed (Mg 1.1 -1.5 mg/dL).   magnesium sulfate 2 GM/50ML infusion  Infuse 50 mL into a venous catheter As Needed (Mg less than or equal to 1mg/dL).   magnesium sulfate 4 GM/100ML infusion  Infuse 100 mL into a venous catheter As Needed (Mg 1.6-1.9 mg/dL).   metoclopramide (REGLAN) 5 MG/ML injection  Infuse 1 mL into a venous catheter 3 (Three) Times a Day.   metroNIDAZOLE (FLAGYL) 5-0.79 MG/ML-% IVPB  Infuse 100 mL into a venous catheter Every 8 (Eight) Hours. Indications: Pneumonia   pantoprazole (PROTONIX) 40 MG injection  Infuse 10 mL into a venous catheter Every Morning Before Breakfast.   polyethylene glycol (MIRALAX) packet  Take 17 g by mouth Daily.   potassium chloride (KLOR-CON) 20 MEQ packet  Take 40 mEq by mouth As Needed (potassium replacement, see admin instructions).   potassium chloride (MICRO-K) 10 MEQ CR capsule  Take 4 capsules by mouth As Needed (potassium replacement.  see admin instructions).   potassium chloride 10 MEQ/100ML  Infuse 100 mL into a venous catheter Every 1 (One) Hour As Needed (potassium protocol PERIPHERAL - see admin instructions).   predniSONE (DELTASONE) 10 MG tablet  Take 1 tablet by mouth Daily With Breakfast.   propofol (DIPRIVAN) 1000 mg/mL emulsion infusion  Infuse 535-5,350 mcg/min into a venous catheter Dose Adjusted By Provider As Needed.   sodium chloride 0.9 % flush  10 mL by Intracatheter route As Needed for Line Care (After Medication Administration or Blood Draw).   sodium chloride 0.9 % flush  10 mL by Intracatheter route Every 12 (Twelve) Hours.   sodium chloride 0.9 % flush  10 mL by Intracatheter route As Needed for Line Care (After Medication Administration or Blood Draw).   sodium chloride 0.9 % flush  10 mL by Intracatheter route Every 12 (Twelve) Hours.   sodium  chloride 0.9 % flush  Infuse 1-10 mL into a venous catheter As Needed for Line Care.   vancomycin 1,250 mg in sodium chloride 0.9 % 250 mL IVPB  Infuse 1,250 mg into a venous catheter Every 12 (Twelve) Hours. Indications: Bacteria in the Blood       Diet Instructions     Diet: Tube Feeding; Continuous; Vital high protein at 35 mL/hour with free water flushes       Discharge Diet:  Tube Feeding   Feeding Type:  Continuous   Formula & Rate:  Vital high protein at 35 mL/hour with free water flushes            TEST  RESULTS PENDING AT DISCHARGE:   Order Current Status    AFB Culture In process    AFB Culture In process    AFB Culture In process    Fungus Culture In process    Fungus Culture In process    Fungus Culture In process    Respiratory Culture In process           Todd Trammell MD  06/08/17  11:01 AM    Please note that this discharge summary required more than 30 minutes to complete.    Please send a copy of this dictation to the following providers:  ERASTO Lee

## 2017-06-08 NOTE — PLAN OF CARE
Problem: Mechanical Ventilation, Invasive (Adult)  Goal: Signs and Symptoms of Listed Potential Problems Will be Absent or Manageable (Mechanical Ventilation, Invasive)  Outcome: Ongoing (interventions implemented as appropriate)    Problem: Sepsis (Adult)  Goal: Signs and Symptoms of Listed Potential Problems Will be Absent or Manageable (Sepsis)  Outcome: Ongoing (interventions implemented as appropriate)

## 2017-06-08 NOTE — PROGRESS NOTES
"  I have personally seen and examined the patient today and discussed overnight interval progress and pertinent issues with nursing staff.    Review of Systems-unable to obtain as the patient is sedated and intubated on the ventilator    History taken from: chart RN      Vital Signs    /81  Pulse 76  Temp 98 °F (36.7 °C) (Oral)   Resp 21  Ht 68\" (172.7 cm)  Wt 227 lb (103 kg)  SpO2 96%  BMI 34.52 kg/m2    Temp:  [98 °F (36.7 °C)-99.5 °F (37.5 °C)] 98 °F (36.7 °C)      Intake/Output Summary (Last 24 hours) at 06/08/17 0942  Last data filed at 06/08/17 0515   Gross per 24 hour   Intake          2335.85 ml   Output             4370 ml   Net         -2034.15 ml     Intake & Output (last 3 days)       06/05 0701 - 06/06 0700 06/06 0701 - 06/07 0700 06/07 0701 - 06/08 0700 06/08 0701 - 06/09 0700    P.O.   120     I.V. (mL/kg) 227 (2.2) 614.4 (6) 747.9 (7.3)     Other 130 280 60     NG/ 635 708     IV Piggyback 1200 1100 900     Total Intake(mL/kg) 2492 (24.1) 2629.4 (25.5) 2535.9 (24.6)     Urine (mL/kg/hr) 1790 (0.7) 3140 (1.3) 4525 (1.8)     Stool 0 (0) 0 (0) 0 (0)     Total Output 1790 3140 4525      Net +702 -510.6 -1989.2              Unmeasured Stool Occurrence 2 x 1 x 1 x            Physical Exam:      General Appearance:  Intubated, sedated    Head:  Normocephalic, without obvious abnormality, atraumatic   Eyes:      Lids and lashes normal, conjunctivae and sclerae normal, no icterus, no pallor, corneas clear, PERRLA   Ears:  Ears appear intact with no abnormalities noted   Throat: No oral lesions, no thrush, oral mucosa moist   Neck: No adenopathy, supple, trachea midline, no thyromegaly, no carotid bruit, no JVD   Back:  No tenderness to percussion or palpation, range of motion normal   Lungs:  Wheezing bilaterally on inspiration    Heart:  Regular rhythm and normal rate, normal S1 and S2, no murmur, no gallop, no rub, no click   Chest Wall:  No abnormalities observed   Abdomen:  Distended, " Normal bowel sounds, no masses, no organomegaly, non-tender, no guarding, no rebound tenderness   Rectal:  Deferred   Extremities: Moves all extremities well, no edema, no cyanosis, no redness   Pulses: Pulses palpable and equal bilaterally   Skin: No bleeding, bruising or rash   Lymph nodes: No palpable adenopathy   Neurologic: Sedated            Results:      Results from last 7 days  Lab Units 06/08/17  0022 06/07/17  0100 06/06/17  0410 06/05/17  0134 06/04/17  0355 06/03/17  0058 06/02/17  0315   WBC 10*3/mm3 10.07 9.41 8.90 8.67 7.33 8.22 11.48     Lab Results   Component Value Date    NEUTROABS 6.26 06/08/2017         Results from last 7 days  Lab Units 06/08/17  0022   CREATININE mg/dL 0.61         Results from last 7 days  Lab Units 06/08/17  0022 06/06/17  0410 06/05/17  0134   CRP mg/dL 0.55 1.64* 5.39*       Results Review:    I have personally reviewed laboratory data, culture results, radiology studies and antimicrobial therapy.    Hospital Medications (active)       Dose Frequency Start End    aspirin tablet 325 mg 325 mg Daily 5/31/2017     Sig - Route: Take 1 tablet by mouth Daily. - Oral    atorvastatin (LIPITOR) tablet 20 mg 20 mg Nightly 5/31/2017     Sig - Route: Take 1 tablet by mouth Every Night. - Oral    castor oil-balsam peru (VENELEX) ointment  Every 12 Hours Scheduled 5/31/2017     Sig - Route: Apply  topically Every 12 (Twelve) Hours. - Topical    cefepime (MAXIPIME) 2 g/100 mL 0.9% NS (mbp) 2 g Every 8 Hours Scheduled 5/31/2017     Sig - Route: Infuse 100 mL into a venous catheter Every 8 (Eight) Hours. - Intravenous    DOPamine 400 mg/250 mL (1.6 mg/mL) infusion 5 mcg/kg/min × 107 kg Titrated 5/31/2017     Sig - Route: Infuse 535 mcg/min into a venous catheter Dose Adjusted By Provider As Needed. - Intravenous    enoxaparin (LOVENOX) syringe 40 mg 40 mg Nightly 6/1/2017     Sig - Route: Inject 0.4 mL under the skin Every Night. - Subcutaneous    furosemide (LASIX) injection 60 mg 60 mg  "Once 6/2/2017 6/2/2017    Sig - Route: Infuse 6 mL into a venous catheter 1 (One) Time. - Intravenous    ipratropium-albuterol (DUO-NEB) nebulizer solution 3 mL 3 mL 4 Times Daily - RT 5/31/2017     Sig - Route: Take 3 mL by nebulization 4 (Four) Times a Day. - Nebulization    Magnesium Sulfate 2 gram Bolus, followed by 8 gram infusion (total Mg dose 10 grams)- Mg less than or equal to 1mg/dL 2 g As Needed 5/31/2017     Sig - Route: Infuse 50 mL into a venous catheter As Needed (Mg less than or equal to 1mg/dL). - Intravenous    Linked Group 1:  \"Or\" Linked Group Details        magnesium sulfate 4 gram infusion- Mg 1.6-1.9 mg/dL 4 g As Needed 5/31/2017     Sig - Route: Infuse 100 mL into a venous catheter As Needed (Mg 1.6-1.9 mg/dL). - Intravenous    Linked Group 1:  \"Or\" Linked Group Details        Magnesium Sulfate 6 gram Infusion (2 gm x 3) -Mg 1.1 -1.5 mg/dL 2 g As Needed 5/31/2017     Sig - Route: Infuse 50 mL into a venous catheter As Needed (Mg 1.1 -1.5 mg/dL). - Intravenous    Linked Group 1:  \"Or\" Linked Group Details        metroNIDAZOLE (FLAGYL) IVPB 500 mg 500 mg Every 8 Hours Scheduled 5/31/2017     Sig - Route: Infuse 100 mL into a venous catheter Every 8 (Eight) Hours. - Intravenous    Pharmacy Meds to Bed Consult  Daily 5/31/2017     Sig - Route: Daily. - Does not apply    potassium chloride (KLOR-CON) packet 40 mEq 40 mEq As Needed 5/31/2017     Sig - Route: Take 40 mEq by mouth As Needed (potassium replacement, see admin instructions). - Oral    Linked Group 2:  \"Or\" Linked Group Details        potassium chloride (KLOR-CON) packet 40 mEq 40 mEq Once 6/2/2017 6/2/2017    Sig - Route: Take 40 mEq by mouth 1 (One) Time. - Oral    potassium chloride (MICRO-K) CR capsule 40 mEq 40 mEq As Needed 5/31/2017     Sig - Route: Take 4 capsules by mouth As Needed (potassium replacement.  see admin instructions). - Oral    Linked Group 2:  \"Or\" Linked Group Details        potassium chloride 10 mEq in 100 mL " "IVPB 10 mEq Every 1 Hour PRN 5/31/2017     Sig - Route: Infuse 100 mL into a venous catheter Every 1 (One) Hour As Needed (potassium protocol PERIPHERAL - see admin instructions). - Intravenous    Linked Group 2:  \"Or\" Linked Group Details        propofol (DIPRIVAN) infusion 10 mg/mL 100 mL 5-50 mcg/kg/min × 107 kg Titrated 5/31/2017     Sig - Route: Infuse 535-5,350 mcg/min into a venous catheter Dose Adjusted By Provider As Needed. - Intravenous    Notes to Pharmacy: Order received from Dr Peter to start diprivan per protocol if patient remains bradycardic on precedex IV    sodium chloride (NS) 0.9 % irrigation solution  - ADS Override Pull   6/2/2017 6/2/2017    Notes to Pharmacy: Created by cabinet override    sodium chloride 0.9 % flush 1-10 mL 1-10 mL As Needed 5/30/2017     Sig - Route: Infuse 1-10 mL into a venous catheter As Needed for Line Care. - Intravenous    vancomycin (VANCOCIN) 1,250 mg in sodium chloride 0.9 % 250 mL IVPB 1,250 mg Every 18 Hours 6/1/2017     Sig - Route: Infuse 1,250 mg into a venous catheter Every 18 (Eighteen) Hours. - Intravenous    dexmedetomidine HCl (PRECEDEX) 400 mcg in sodium chloride 0.9 % 100 mL (4 mcg/mL) infusion (Discontinued) 0.2-1.5 mcg/kg/hr × 107 kg Titrated 5/31/2017 6/2/2017    Sig - Route: Infuse 21.4-160.5 mcg/hr into a venous catheter Dose Adjusted By Provider As Needed. - Intravenous    doxycycline (VIBRAMYCIN) 100 mg/100 mL 0.9% NS MBP (Discontinued) 100 mg Every 12 Hours 5/31/2017 6/1/2017    Sig - Route: Infuse 100 mL into a venous catheter Every 12 (Twelve) Hours. - Intravenous    heparin (porcine) 5000 UNIT/ML injection 2,500 Units (Discontinued) 23.4 Units/kg × 107 kg As Needed 5/31/2017 6/1/2017    Sig - Route: Infuse 0.5 mL into a venous catheter As Needed (Per Heparin Nomogram For PTT 45-55). - Intravenous    Reason for Discontinue: Drug interaction    heparin (porcine) 5000 UNIT/ML injection 5,000 Units (Discontinued) 46.7 Units/kg × 107 kg As " Needed 5/31/2017 6/1/2017    Sig - Route: Infuse 1 mL into a venous catheter As Needed (Per Heparin Nomogram For PTT Less Than 45). - Intravenous    Reason for Discontinue: Drug interaction    heparin 66030 units/250 mL (100 units/mL) in 0.45 % NaCl infusion (Discontinued) 9.35 Units/kg/hr × 81.1 kg (Adjusted) Titrated 5/31/2017 6/1/2017    Sig - Route: Infuse 758.285 Units/hr into a venous catheter Dose Adjusted By Provider As Needed. - Intravenous    sodium chloride 0.9 % infusion (Discontinued) 100 mL/hr Continuous 5/30/2017 6/2/2017    Sig - Route: Infuse 100 mL/hr into a venous catheter Continuous. - Intravenous            Cultures:    Blood Culture   Date Value Ref Range Status   05/31/2017 No growth at 24 hours  Preliminary   05/31/2017 No growth at 24 hours  Preliminary   05/30/2017 No growth at 2 days  Preliminary   05/30/2017 Gram positive cocci, consistent with Staph spp (A)  Preliminary       PROBLEM LIST:    Patient Active Problem List   Diagnosis   • Pneumonia   • Sepsis due to pneumonia       Assessment/Plan     ASSESSMENT:    1. Sepsis  2. Staph bacteremia  3. Pneumonia     PLAN:    Continues to have a low-grade fever with normal white count and normal CRP level.  No fever or diarrhea reported.  Currently on vancomycin and Flagyl and recommended to continue for now.    Normal white count.  Overall patient has shown improvement from infectious disease standpoint.    Blood cultures from 5/30/2017 finalized one set out of 2 as growth of coagulase-negative staphylococcus which is most likely contaminant.  Otherwise remaining cultures including BAL are showing no growth.  We will continue to monitor closely.  Hope to de-escalate soon if the patient continues to show persistent progress.      Patients findings and recommendations were discussed with nursing staff    Code Status: Full Code       Rosalia Pinto PA-C  06/08/17  9:42 AM

## 2017-06-08 NOTE — SIGNIFICANT NOTE
Consult received, chart reviewed. Pt transferred from [facility] to [facility] today. She continues w/ oral intubation, unable to functionally participate.    Formally discharged pending new MD order w/ improved status.     Thank you-  Altagracia Chandra M.S., CCC/SLP

## 2017-06-08 NOTE — PROGRESS NOTES
LOS: 9 days   Patient Care Team:  ERASTO Lee as PCP - General (Family Medicine)    Chief Complaint:  Pulmonology is following for pneumonia and respiratory failure.    Subjective     Interval History: patient is intubated and sedated, but arouses to voice/stimulation easily.  No acute events reported overnight.      History taken from: chart RN Patient unable to give history due to patient intubated.    Review of Systems:   Review of Systems - History obtained from chart review and unobtainable from patient due to mental status and Intubated      Objective     Vital Signs  Temp:  [98 °F (36.7 °C)-99.5 °F (37.5 °C)] 98 °F (36.7 °C)  Heart Rate:  [65-88] 76  Resp:  [21-25] 21  BP: ()/(50-82) 151/81  FiO2 (%):  [30 %] 30 %  Body mass index is 34.52 kg/(m^2).    Intake/Output Summary (Last 24 hours) at 06/08/17 0940  Last data filed at 06/08/17 0515   Gross per 24 hour   Intake          2335.85 ml   Output             4370 ml   Net         -2034.15 ml          Vent Settings  Vent Mode: VC/AC    Vt (Set, L): 0.4 L  Resp Rate (Set): 21  Pressure Support (cm H2O): 8 cm H20  FiO2 (%): 30 %  PEEP/CPAP (cm H2O): 5 cm H20    Minute Ventilation (L/min) (Obs): 9.55 L/min  Resp Rate (Observed) Vent: 29     I:E Ratio (Obs): 1:2.30    PIP Observed (cm H2O): 23 cm H2O     Physical Exam:  GENERAL APPEARANCE: Intubated, no sedation and no restraints.  Appears to be resting comfortably in bed.     HEAD: normocephalic.    EYES: PERRLA.    THROAT: ET tube in place. Oral cavity and pharynx normal. No inflammation, swelling, exudate, or lesions.     NECK: Neck supple.     CARDIAC: Normal S1 and S2. No S3, S4 or murmurs. Rhythm is regular. There is no peripheral edema, cyanosis or pallor. Extremities are warm and well perfused. Capillary refill is less than 2 seconds. No carotid bruits.    LUNGS: Clear to auscultation and percussion without rales, rhonchi, wheezing or diminished breath sounds.    ABDOMEN: Positive bowel  sounds. Soft, nondistended, nontender.     EXTREMITIES: No significant deformity or joint abnormality. No edema. Peripheral pulses intact.    NEUROLOGICAL: Will follow verbal commands.      PSYCHIATRIC: Unable to assess due to intubation.     Results Review:     I reviewed the patient's new clinical results.  I reviewed the patient's new imaging results and agree with the interpretation.    Results from last 7 days  Lab Units 06/08/17  0022 06/07/17  0100 06/06/17  0410   WBC 10*3/mm3 10.07 9.41 8.90   HEMOGLOBIN g/dL 11.9* 11.3* 10.7*   PLATELETS 10*3/mm3 354 316 289       Results from last 7 days  Lab Units 06/08/17  0022 06/07/17  0100 06/06/17  0410   SODIUM mmol/L 146 144 145   POTASSIUM mmol/L 3.8 3.4* 3.7   CHLORIDE mmol/L 112 116* 116*   TOTAL CO2 mmol/L 27.4 24.6 25.7   BUN mg/dL 29* 25* 28*   CREATININE mg/dL 0.61 0.54 0.50   CALCIUM mg/dL 9.7 9.2 9.3   GLUCOSE mg/dL 137* 118* 140*   MAGNESIUM mg/dL 2.1 2.2 2.3     Lab Results   Component Value Date    INR 1.02 06/01/2017    INR 1.03 05/31/2017    INR 0.98 05/31/2017    PROTIME 11.3 06/01/2017    PROTIME 11.5 05/31/2017    PROTIME 10.9 05/31/2017       Results from last 7 days  Lab Units 06/08/17  0022 06/06/17  0410 06/05/17  0134   ALK PHOS U/L 60 54 63   BILIRUBIN mg/dL 0.2 0.2 0.2   ALT (SGPT) U/L 25 18 18   AST (SGOT) U/L 16 25 24       Results from last 7 days  Lab Units 06/08/17  0449   PH, ARTERIAL pH units 7.386   PO2 ART mm Hg 71.0*   PCO2, ARTERIAL mm Hg 41.9   HCO3 ART mmol/L 24.6     Imaging Results (last 24 hours)     Procedure Component Value Units Date/Time    XR Chest AP [[id number]] Collected:  06/07/17 0941     Updated:  06/07/17 0947    Narrative:       XR CHEST AP-     CLINICAL INDICATION: Sedation in intubated patients.          COMPARISON: 06/06/2017.      TECHNIQUE: Single frontal view of the chest.     FINDINGS:     Slight improvement in aeration.  Mild pulmonary congestion.  There is no evidence of an acute osseous abnormality.    There are no suspicious-appearing parenchymal soft tissue nodules.            Impression:       Slight interval improvement.         This report was finalized on 6/7/2017 9:45 AM by Dr. Mauricio Santiago MD.                Medication Review:   Scheduled Medications:    acetylcysteine 600 mg Inhalation Q12H   aspirin 325 mg Oral Daily   atorvastatin 20 mg Oral Nightly   castor oil-balsam peru  Topical Q12H   enoxaparin 40 mg Subcutaneous Nightly   furosemide 60 mg Intravenous Q12H   ipratropium-albuterol 3 mL Nebulization 4x Daily - RT   metoclopramide 5 mg Intravenous TID   metroNIDAZOLE 500 mg Intravenous Q8H   pantoprazole 40 mg Intravenous Q AM   Pharmacy Meds to Bed Consult  Does not apply Daily   polyethylene glycol 17 g Oral Daily   predniSONE 10 mg Oral Daily With Breakfast   sodium chloride 10 mL Intracatheter Q12H   sodium chloride 10 mL Intracatheter Q12H   vancomycin 1,250 mg Intravenous Q12H     Continuous infusions:    propofol 5-50 mcg/kg/min Last Rate: 25 mcg/kg/min (06/08/17 0408)       Assessment/Plan       Neuro: Her sedation has been turned off, she is awake and unrestrained, she is calm and does not appear anxious.     Respiratory: Much improvement noted, we have stopped sedation and will attempt breathing trial again today, RT and RN aware.     Continue antibiotics, scheduled inhalants and nebulizer's PRN.     We will continue PO prednisone.     Ventilator settings and graphics reviewed     ID: We will continue IV antibiotics, being managed by ID at this time.   Microbiology Results (last 10 days)     Procedure Component Value - Date/Time    Respiratory Culture [[id number]] Collected:  06/02/17 1024    Lab Status:  Final result Specimen:  Lavage from Lung, Right Middle Lobe Updated:  06/05/17 0940     Respiratory Culture No growth     Gram Stain Result No organisms seen    Respiratory Culture [[id number]] Collected:  06/02/17 1024    Lab Status:  Final result Specimen:  Lavage from Lung, Left Lower  Lobe Updated:  06/05/17 0941     Respiratory Culture No growth     Gram Stain Result No organisms seen    Blood Culture [[id number]]  (Normal) Collected:  05/31/17 1550    Lab Status:  Final result Specimen:  Blood from Arm, Left Updated:  06/05/17 1701     Blood Culture No growth at 5 days    Blood Culture [[id number]]  (Normal) Collected:  05/31/17 1431    Lab Status:  Final result Specimen:  Blood from Hand, Right Updated:  06/05/17 1601     Blood Culture No growth at 5 days    Blood Culture [[id number]]  (Normal) Collected:  05/30/17 1727    Lab Status:  Final result Specimen:  Blood from Arm, Left Updated:  06/04/17 1801     Blood Culture No growth at 5 days    Blood Culture [[id number]]  (Abnormal) Collected:  05/30/17 1649    Lab Status:  Final result Specimen:  Blood from Arm, Left Updated:  06/05/17 1108     Blood Culture Staphylococcus, coagulase negative (A)      Probable contaminant.        Isolated from --     Gram Stain Result Gram positive cocci in clusters          Cardiac: stable, no concerns at this time. Continue continuous ECG and v/s monitoring.     Renal: BUN/Creatnine WNL, we will continue strict I&O, patient tolerated lasix injection yesterday well, she is negative almost 2L today.     Electrolytes: stable, we will continue to monitor levels and replace as needed    GI: She has been tolerating tube feedings well, we currently have those stopped to begin breathing trial.     Hematology: Continue to monitor labs and treat accordingly.    CXR:    Patient Active Problem List   Diagnosis Code   • Pneumonia J18.9   • Sepsis due to pneumonia J18.9, A41.9         Bedside rounds were completed with RN and RRT, discussed case and plan in great detail.    No family present during rounds.     ERASTO Elizabeth  06/08/17  9:40 AM    Scribed for Dr. Peter, by Margarita MAURER.   I, Abdulkadir Peter M.D. attest that the above note accurately reflects the work and decisions made  by me.  Patient  was seen and evaluated by Dr. Peter, including history of present illness, physical exam, assessment, and treatment plan.  The above note was reviewed and edited by Dr. Peter. The patient was  discussed with Miss Church and I agree with her history physical assessment and plan.Critical Care time spent in direct patient care: 33 minutes (excluding procedure time, if applicable) including high complexity decision making to assess, manipulate, and support vital organ system failure in this individual who has impairment of one or more vital organ systems such that there is a high probability of imminent or life threatening deterioration in the patient’s condition.

## 2017-06-08 NOTE — PROGRESS NOTES
[facility]    Patient Identification:  Name:  Mayra Hays  Age:  71 y.o.  Sex:  female  :  1945  MRN:  [id number]  Visit Number:  [id number]  Primary Care Provider:  ERASTO Lee    Length of stay:  9  CCU length of stay:  9    CODE STATUS:  Full Code    Subjective:  70 yo admitted last night with shortness of air and respiratory failure from right middle lobe and right lower lobe pneumonia. She was intubated in the ED. Shortly afterwards, she had elevated troponins and was diagnosed with a NSTEMI. She remains intubated and cannot give me any history at this time. No family is at bedside. She was on dopamine briefly due to the need for sedation for the vent. The patient then became bradycardic again on the night of 2017 through the morning of 2017; it was noted that she was bradycardic when she was turned to her right side and it resolves when she is turned to her left side. Dopamine was not restarted even though it was ordered.  She is doing daily CPAP trials and lasts anywhere from 20 minutes to a couple of hours.  She is answering yes and no questions but due to the oral intubation she is unable to give me any history today.  She denies pain.   She is having some trouble breathing but it has improved.  She wants the ETT out as soon as possible.    Procedures:  2017 intubated and mechanically ventilated  2017 bronchoscopy  2017 PICC double lumen 5 [place] right basilic vein  -------------------------------------------------------------------------------------------------------------------  Ventilator settings:  Vent Mode: VC/AC  FiO2 (%):  [25 %-30 %] 30 %  S RR:  [21] 21  PEEP/CPAP (cm H2O):  [5 cm H20] 5 cm H20  MAP (cm H2O):  [10-14] 11  -------------------------------------------------------------------------------------------------------------------  pH pH, Arterial   Date Value Ref Range Status   2017 7.386 7.350 - 7.450  pH units Final   06/07/2017 7.354 7.350 - 7.450 pH units Final   06/06/2017 7.350 7.350 - 7.450 pH units Final   06/06/2017 7.370 7.350 - 7.450 pH units Final      pO2 pO2, Arterial   Date Value Ref Range Status   06/08/2017 71.0 (L) 80.0 - 100.0 mm Hg Final   06/07/2017 61.8 (L) 80.0 - 100.0 mm Hg Final   06/06/2017 62.5 (L) 80.0 - 100.0 mm Hg Final   06/06/2017 86.2 80.0 - 100.0 mm Hg Final      pCO2 pCO2, Arterial   Date Value Ref Range Status   06/08/2017 41.9 35.0 - 45.0 mm Hg Final   06/07/2017 42.0 35.0 - 45.0 mm Hg Final   06/06/2017 44.4 35.0 - 45.0 mm Hg Final   06/06/2017 38.1 35.0 - 45.0 mm Hg Final      HCO3 HCO3, Arterial   Date Value Ref Range Status   06/08/2017 24.6 22.0 - 26.0 mmol/L Final   06/07/2017 22.9 22.0 - 26.0 mmol/L Final   06/06/2017 24.0 22.0 - 26.0 mmol/L Final   06/06/2017 21.5 (L) 22.0 - 26.0 mmol/L Final      -------------------------------------------------------------------------------------------------------------------  Current Hospital Meds:  acetylcysteine 600 mg Inhalation Q12H   aspirin 325 mg Oral Daily   atorvastatin 20 mg Oral Nightly   castor oil-balsam peru  Topical Q12H   enoxaparin 40 mg Subcutaneous Nightly   furosemide 60 mg Intravenous Q12H   ipratropium-albuterol 3 mL Nebulization 4x Daily - RT   metoclopramide 5 mg Intravenous TID   metroNIDAZOLE 500 mg Intravenous Q8H   pantoprazole 40 mg Intravenous Q AM   Pharmacy Meds to Bed Consult  Does not apply Daily   polyethylene glycol 17 g Oral Daily   predniSONE 10 mg Oral Daily With Breakfast   sodium chloride 10 mL Intracatheter Q12H   sodium chloride 10 mL Intracatheter Q12H   vancomycin 1,250 mg Intravenous Q12H     propofol 5-50 mcg/kg/min Last Rate: 25 mcg/kg/min (06/08/17 0408)       Diet:  Vital High Protein; Tube Feeding Type: Continuous; Goal Rate of (mL/hr): 35; Patient is NPO (No Tray), no free water  flush  -------------------------------------------------------------------------------------------------------------------  Vital Signs:  Temp:  [98 °F (36.7 °C)-99.5 °F (37.5 °C)] 98 °F (36.7 °C)  Heart Rate:  [] 72  Resp:  [21-25] 21  BP: ()/(50-84) 118/81  FiO2 (%):  [25 %-30 %] 30 %  Last 3 weights    06/06/17  0500 06/07/17  0540 06/08/17  0400   Weight: 228 lb 4.8 oz (104 kg) 227 lb (103 kg) 227 lb (103 kg)     Body mass index is 34.52 kg/(m^2).      -------------------------------------------------------------------------------------------------------------------  Physical exam:   Constitutional: Well-developed and well-nourished. Moderate to severe respiratory distress. Intubated with sedation being held but currently on the vent.   HENT: Head: Normocephalic and atraumatic.  Mouth: Moist mucous membranes.  Oral ETT and oral Gtube.  Eyes: Conjunctivae and EOM are normal. Pupils are equal, round, and reactive to light. No scleral icterus.  Neck: Neck supple. No JVD present.    Cardiovascular: Normal rate, regular rhythm and normal heart sounds with no murmur.  Pulmonary/Chest: Moderate to severe respiratory distress, no wheezes, diffuse rhonchi, no crackles, with normal breath sounds and fair air movement.  Abdominal: Soft. Bowel sounds are normal. No distension and no tenderness.   Musculoskeletal: No edema, no tenderness, and no deformity. No red or swollen joints anywhere.   Neurological: sedation was being held when I saw the patient. She was able to follow commands. She can move her arms and legs equally on both sides. She had no obvious facial droop. The orientation questions could not be asked as she was still orally intubated.   Skin: Skin is warm and dry. No rash noted. No pallor.   Peripheral vascular: No edema and strong pulses on all 4 extremities.  Genitourinary: Shetty catheter in place, yellow urine in the collection  container.  -------------------------------------------------------------------------------------------------------------------  Tele:  NS in the 70's; I have personally reviewed/looked at the telemetry strips.  -------------------------------------------------------------------------------------------------------------------  Results from last 7 days  Lab Units 06/08/17 0022 06/07/17 0100 06/06/17 0410 06/05/17  0134   CRP mg/dL 0.55  --  1.64* 5.39*   WBC 10*3/mm3 10.07 9.41 8.90 8.67   HEMOGLOBIN g/dL 11.9* 11.3* 10.7* 11.7*   HEMATOCRIT % 39.0 37.0 35.5* 37.9   MCV fL 85.7 86.4 86.6 86.5   MCHC g/dL 30.5* 30.5* 30.1* 30.9*   PLATELETS 10*3/mm3 354 202 643 548     Results from last 7 days  Lab Units 06/08/17  0449   PH, ARTERIAL pH units 7.386   PO2 ART mm Hg 71.0*   PCO2, ARTERIAL mm Hg 41.9   HCO3 ART mmol/L 24.6     Results from last 7 days  Lab Units 06/08/17  0022 06/07/17 0100 06/06/17 0410 06/05/17  0134   SODIUM mmol/L 146 144 145 142   POTASSIUM mmol/L 3.8 3.4* 3.7 4.9   MAGNESIUM mg/dL 2.1 2.2 2.3 2.4   CHLORIDE mmol/L 112 116* 116* 113*   TOTAL CO2 mmol/L 27.4 24.6 25.7 24.5   BUN mg/dL 29* 25* 28* 24*   CREATININE mg/dL 0.61 0.54 0.50 0.58   EGFR IF NONAFRICN AM mL/min/1.73 97 112 122 103   CALCIUM mg/dL 9.7 9.2 9.3 9.5   PHOSPHORUS mg/dL 4.2 3.5 2.8 3.4   GLUCOSE mg/dL 137* 118* 140* 175*   ALBUMIN g/dL 3.50  --  3.10* 3.40   BILIRUBIN mg/dL 0.2  --  0.2 0.2   ALK PHOS U/L 60  --  54 63   AST (SGOT) U/L 16  --  25 24   ALT (SGPT) U/L 25  --  18 18   Estimated Creatinine Clearance: 82.1 mL/min (by C-G formula based on Cr of 0.61).    Lab Results   Component Value Date    FREET4 1.88 (H) 05/31/2017   -------------------------------------------------------------------------------------------------------------------  Imaging Results (last 72 hours)     Procedure Component Value Units Date/Time    XR Chest AP [[id number]] Collected:  06/06/17 0854     Updated:  06/06/17 0937    COMPARISON: 06/05/2017    FINDINGS:  Bibasilar effusions and right basilar consolidation.  Endotracheal tube overlies the tracheal air column above the nadine.  The cardiac silhouette is normal. The pulmonary vasculature is unremarkable.  There is no evidence of an acute osseous abnormality.   There are no suspicious-appearing parenchymal soft tissue nodules.    Impression:       Bibasilar effusions and right basilar consolidation.     This report was finalized on 6/6/2017 9:35 AM by Dr. Mauricio Santiago MD.         XR Chest AP [[id number]] Collected:  06/07/17 0941     Updated:  06/07/17 0947    COMPARISON: 06/06/2017.   FINDINGS:  Slight improvement in aeration.  Mild pulmonary congestion.  There is no evidence of an acute osseous abnormality.   There are no suspicious-appearing parenchymal soft tissue nodules.    Impression:       Slight interval improvement.      This report was finalized on 6/7/2017 9:45 AM by Dr. Mauricio Santiago MD.      I have personally looked at the radiology images and read the final radiology report.  -------------------------------------------------------------------------------------------------------------------  Assesment and Plan:  -Acute hypoxic and hypercapnic respiratory failure and sepsis due to right middle lobe and right lower lobe pneumonia  -Primary respiratory acidosis and a secondary compensatory metabolic alkalosis, now improved  -Bilateral pleural effusions  -Coagulative negative staphylococcus in one blood culture that could be a contaminant  -Tachybrady syndrome  -Lactic acidosis that has resolved  -Prolonged QTc of 559 ms, down to 511 ms by 6/2/2017   -Sinus bradycardia, now resolved  -Elevated troponins concerning for type 2 myocardial infarction/NSTEMI, but Dr. Awan does not think that she had a NSTEMI  -Type 2 diabetes mellitus (Ha1c 7.3 this admission), newly diagnosed this admission  -Diastolic congestive heart failure with a mild exacerbation due to needed medical care, now resolved  -Mild  pulmonary hypertension  -Acute COPD exacerbation, improved  -Elevated liver enzymes, resolved  -Morbid obesity  -Possible acute renal failure on top of chronic kidney disease Stage 3 (baseline Cr now 0.6 and previously thought to be 0.94-1.02)  -Mild hyperkalemia that has now turned to mild hypokalemia  -Past heavy tobacco smoking addiction    Evaluated the patient with Dr. Peter today.  Will try to have [facility] evaluate the patient.  The CPAP trials to continue today.  Will repeat labs in the morning.  Will continue the cefepime and flagyl today.  Blood pressures low normal but not needing vasopressors today.  Glucose levels not low and are below 200.  On a prednisone wean.    The patient is high risk due to the following diagnoses/reasons:  Sepsis and acute respiratory failure    Todd Trammell MD  06/08/17  8:22 AM

## 2017-06-09 LAB
ALBUMIN SERPL-MCNC: 3.8 G/DL (ref 3.4–4.8)
ALBUMIN/GLOB SERPL: 1 G/DL (ref 1.5–2.5)
ALP SERPL-CCNC: 64 U/L (ref 35–104)
ALT SERPL W P-5'-P-CCNC: 25 U/L (ref 10–36)
ANION GAP SERPL CALCULATED.3IONS-SCNC: 4 MMOL/L (ref 3.6–11.2)
AST SERPL-CCNC: 17 U/L (ref 10–30)
BASOPHILS # BLD AUTO: 0.14 10*3/MM3 (ref 0–0.3)
BASOPHILS NFR BLD AUTO: 1.2 % (ref 0–2)
BILIRUB SERPL-MCNC: 0.3 MG/DL (ref 0.2–1.8)
BUN BLD-MCNC: 46 MG/DL (ref 7–21)
BUN/CREAT SERPL: 50.5 (ref 7–25)
CALCIUM SPEC-SCNC: 10.3 MG/DL (ref 7.7–10)
CHLORIDE SERPL-SCNC: 109 MMOL/L (ref 99–112)
CHOLEST SERPL-MCNC: 140 MG/DL (ref 0–200)
CO2 SERPL-SCNC: 33 MMOL/L (ref 24.3–31.9)
CREAT BLD-MCNC: 0.91 MG/DL (ref 0.43–1.29)
DEPRECATED RDW RBC AUTO: 41.9 FL (ref 37–54)
EOSINOPHIL # BLD AUTO: 0.5 10*3/MM3 (ref 0–0.7)
EOSINOPHIL NFR BLD AUTO: 4.2 % (ref 0–7)
ERYTHROCYTE [DISTWIDTH] IN BLOOD BY AUTOMATED COUNT: 13.6 % (ref 11.5–14.5)
GFR SERPL CREATININE-BSD FRML MDRD: 61 ML/MIN/1.73
GLOBULIN UR ELPH-MCNC: 3.9 GM/DL
GLUCOSE BLD-MCNC: 161 MG/DL (ref 70–110)
HCT VFR BLD AUTO: 41.2 % (ref 37–47)
HDLC SERPL-MCNC: 25 MG/DL (ref 60–100)
HGB BLD-MCNC: 12.9 G/DL (ref 12–16)
IMM GRANULOCYTES # BLD: 0.33 10*3/MM3 (ref 0–0.03)
IMM GRANULOCYTES NFR BLD: 2.8 % (ref 0–0.5)
LDLC SERPL CALC-MCNC: 57 MG/DL (ref 0–100)
LDLC/HDLC SERPL: 2.28 {RATIO}
LYMPHOCYTES # BLD AUTO: 2.53 10*3/MM3 (ref 1–3)
LYMPHOCYTES NFR BLD AUTO: 21.3 % (ref 16–46)
MCH RBC QN AUTO: 26.8 PG (ref 27–33)
MCHC RBC AUTO-ENTMCNC: 31.3 G/DL (ref 33–37)
MCV RBC AUTO: 85.5 FL (ref 80–94)
MONOCYTES # BLD AUTO: 0.98 10*3/MM3 (ref 0.1–0.9)
MONOCYTES NFR BLD AUTO: 8.2 % (ref 0–12)
NEUTROPHILS # BLD AUTO: 7.41 10*3/MM3 (ref 1.4–6.5)
NEUTROPHILS NFR BLD AUTO: 62.3 % (ref 40–75)
OSMOLALITY SERPL CALC.SUM OF ELEC: 305.9 MOSM/KG (ref 273–305)
PLATELET # BLD AUTO: 364 10*3/MM3 (ref 130–400)
PMV BLD AUTO: 11.9 FL (ref 6–10)
POTASSIUM BLD-SCNC: 3.8 MMOL/L (ref 3.5–5.3)
PROT SERPL-MCNC: 7.7 G/DL (ref 6–8)
RBC # BLD AUTO: 4.82 10*6/MM3 (ref 4.2–5.4)
SODIUM BLD-SCNC: 146 MMOL/L (ref 135–153)
TRIGL SERPL-MCNC: 290 MG/DL (ref 0–150)
VLDLC SERPL-MCNC: 58 MG/DL
WBC NRBC COR # BLD: 11.89 10*3/MM3 (ref 4.5–12.5)

## 2017-06-09 PROCEDURE — 85025 COMPLETE CBC W/AUTO DIFF WBC: CPT | Performed by: INTERNAL MEDICINE

## 2017-06-09 PROCEDURE — 97163 PT EVAL HIGH COMPLEX 45 MIN: CPT

## 2017-06-09 PROCEDURE — 80053 COMPREHEN METABOLIC PANEL: CPT | Performed by: INTERNAL MEDICINE

## 2017-06-09 PROCEDURE — 80061 LIPID PANEL: CPT | Performed by: INTERNAL MEDICINE

## 2017-06-09 PROCEDURE — 97530 THERAPEUTIC ACTIVITIES: CPT

## 2017-06-10 LAB — PREALB SERPL-MCNC: 34 MG/DL (ref 10–36)

## 2017-06-11 LAB
ANION GAP SERPL CALCULATED.3IONS-SCNC: 5.3 MMOL/L (ref 3.6–11.2)
BACTERIA SPEC RESP CULT: NO GROWTH
BUN BLD-MCNC: 57 MG/DL (ref 7–21)
BUN/CREAT SERPL: 53.8 (ref 7–25)
CALCIUM SPEC-SCNC: 10.6 MG/DL (ref 7.7–10)
CHLORIDE SERPL-SCNC: 113 MMOL/L (ref 99–112)
CO2 SERPL-SCNC: 27.7 MMOL/L (ref 24.3–31.9)
CREAT BLD-MCNC: 1.06 MG/DL (ref 0.43–1.29)
DEPRECATED RDW RBC AUTO: 44 FL (ref 37–54)
ERYTHROCYTE [DISTWIDTH] IN BLOOD BY AUTOMATED COUNT: 14 % (ref 11.5–14.5)
GFR SERPL CREATININE-BSD FRML MDRD: 51 ML/MIN/1.73
GLUCOSE BLD-MCNC: 349 MG/DL (ref 70–110)
GLUCOSE BLDC GLUCOMTR-MCNC: 322 MG/DL (ref 70–130)
GLUCOSE BLDC GLUCOMTR-MCNC: 340 MG/DL (ref 70–130)
GRAM STN SPEC: NORMAL
HCT VFR BLD AUTO: 40 % (ref 37–47)
HGB BLD-MCNC: 12.3 G/DL (ref 12–16)
MCH RBC QN AUTO: 26.9 PG (ref 27–33)
MCHC RBC AUTO-ENTMCNC: 30.8 G/DL (ref 33–37)
MCV RBC AUTO: 87.3 FL (ref 80–94)
OSMOLALITY SERPL CALC.SUM OF ELEC: 320.3 MOSM/KG (ref 273–305)
PLATELET # BLD AUTO: 344 10*3/MM3 (ref 130–400)
PMV BLD AUTO: 12.4 FL (ref 6–10)
POTASSIUM BLD-SCNC: 3.6 MMOL/L (ref 3.5–5.3)
RBC # BLD AUTO: 4.58 10*6/MM3 (ref 4.2–5.4)
SODIUM BLD-SCNC: 146 MMOL/L (ref 135–153)
WBC NRBC COR # BLD: 13.46 10*3/MM3 (ref 4.5–12.5)

## 2017-06-11 PROCEDURE — 82962 GLUCOSE BLOOD TEST: CPT

## 2017-06-11 PROCEDURE — 85027 COMPLETE CBC AUTOMATED: CPT | Performed by: INTERNAL MEDICINE

## 2017-06-11 PROCEDURE — 80048 BASIC METABOLIC PNL TOTAL CA: CPT | Performed by: INTERNAL MEDICINE

## 2017-06-12 ENCOUNTER — APPOINTMENT (OUTPATIENT)
Dept: GENERAL RADIOLOGY | Facility: HOSPITAL | Age: 72
End: 2017-06-12

## 2017-06-12 LAB
A-A DO2: 102.3 MMHG (ref 0–300)
ANION GAP SERPL CALCULATED.3IONS-SCNC: 8.3 MMOL/L (ref 3.6–11.2)
ATMOSPHERIC PRESS: 731 MMHG
BASE EXCESS BLDV CALC-SCNC: 0.8 MMOL/L
BDY SITE: NORMAL
BODY TEMPERATURE: 98.6 C
BUN BLD-MCNC: 52 MG/DL (ref 7–21)
BUN/CREAT SERPL: 53.6 (ref 7–25)
CALCIUM SPEC-SCNC: 10.6 MG/DL (ref 7.7–10)
CHLORIDE SERPL-SCNC: 115 MMOL/L (ref 99–112)
CO2 SERPL-SCNC: 24.7 MMOL/L (ref 24.3–31.9)
CREAT BLD-MCNC: 0.97 MG/DL (ref 0.43–1.29)
DEPRECATED RDW RBC AUTO: 45.7 FL (ref 37–54)
ERYTHROCYTE [DISTWIDTH] IN BLOOD BY AUTOMATED COUNT: 14.4 % (ref 11.5–14.5)
GFR SERPL CREATININE-BSD FRML MDRD: 57 ML/MIN/1.73
GLUCOSE BLD-MCNC: 309 MG/DL (ref 70–110)
GLUCOSE BLDC GLUCOMTR-MCNC: 306 MG/DL (ref 70–130)
GLUCOSE BLDC GLUCOMTR-MCNC: 330 MG/DL (ref 70–130)
GLUCOSE BLDC GLUCOMTR-MCNC: 391 MG/DL (ref 70–130)
HCO3 BLDV-SCNC: 27.6 MMOL/L
HCT VFR BLD AUTO: 41.2 % (ref 37–47)
HGB BLD-MCNC: 12.4 G/DL (ref 12–16)
HGB BLDA-MCNC: 13.1 G/DL (ref 12–16)
HOROWITZ INDEX BLD+IHG-RTO: 30 %
MCH RBC QN AUTO: 26.7 PG (ref 27–33)
MCHC RBC AUTO-ENTMCNC: 30.1 G/DL (ref 33–37)
MCV RBC AUTO: 88.6 FL (ref 80–94)
MODALITY: NORMAL
OSMOLALITY SERPL CALC.SUM OF ELEC: 320 MOSM/KG (ref 273–305)
PCO2 BLDV: 53.2 MM HG
PEEP RESPIRATORY: 5 CM[H2O]
PH BLDV: 7.33 [PH]
PLATELET # BLD AUTO: 329 10*3/MM3 (ref 130–400)
PMV BLD AUTO: 12.8 FL (ref 6–10)
PO2 BLDV: 40.4 MM HG
POTASSIUM BLD-SCNC: 3.6 MMOL/L (ref 3.5–5.3)
PSV: 8 CMH2O
RBC # BLD AUTO: 4.65 10*6/MM3 (ref 4.2–5.4)
SAO2 % BLDCOV: 71.1 %
SODIUM BLD-SCNC: 148 MMOL/L (ref 135–153)
VENTILATOR MODE: NORMAL
WBC NRBC COR # BLD: 16.39 10*3/MM3 (ref 4.5–12.5)

## 2017-06-12 PROCEDURE — 82962 GLUCOSE BLOOD TEST: CPT

## 2017-06-12 PROCEDURE — 80048 BASIC METABOLIC PNL TOTAL CA: CPT | Performed by: INTERNAL MEDICINE

## 2017-06-12 PROCEDURE — 71010 HC CHEST PA OR AP: CPT

## 2017-06-12 PROCEDURE — 82803 BLOOD GASES ANY COMBINATION: CPT | Performed by: INTERNAL MEDICINE

## 2017-06-12 PROCEDURE — 71010 XR CHEST 1 VW: CPT | Performed by: RADIOLOGY

## 2017-06-12 PROCEDURE — 97167 OT EVAL HIGH COMPLEX 60 MIN: CPT

## 2017-06-12 PROCEDURE — 85027 COMPLETE CBC AUTOMATED: CPT | Performed by: INTERNAL MEDICINE

## 2017-06-13 LAB
A-A DO2: 84.9 MMHG (ref 0–300)
ANION GAP SERPL CALCULATED.3IONS-SCNC: 7.7 MMOL/L (ref 3.6–11.2)
ARTERIAL PATENCY WRIST A: ABNORMAL
ATMOSPHERIC PRESS: 731 MMHG
BASE EXCESS BLDA CALC-SCNC: -0.1 MMOL/L
BASOPHILS # BLD AUTO: 0.08 10*3/MM3 (ref 0–0.3)
BASOPHILS NFR BLD AUTO: 0.6 % (ref 0–2)
BDY SITE: ABNORMAL
BODY TEMPERATURE: 98.6 C
BUN BLD-MCNC: 64 MG/DL (ref 7–21)
BUN/CREAT SERPL: 71.9 (ref 7–25)
CALCIUM SPEC-SCNC: 10.1 MG/DL (ref 7.7–10)
CHLORIDE SERPL-SCNC: 114 MMOL/L (ref 99–112)
CO2 SERPL-SCNC: 29.3 MMOL/L (ref 24.3–31.9)
COHGB MFR BLD: 1.3 % (ref 0–5)
CREAT BLD-MCNC: 0.89 MG/DL (ref 0.43–1.29)
CRP SERPL-MCNC: 2.08 MG/DL (ref 0–0.99)
DEPRECATED RDW RBC AUTO: 47 FL (ref 37–54)
EOSINOPHIL # BLD AUTO: 0.44 10*3/MM3 (ref 0–0.7)
EOSINOPHIL NFR BLD AUTO: 3.5 % (ref 0–7)
ERYTHROCYTE [DISTWIDTH] IN BLOOD BY AUTOMATED COUNT: 14.6 % (ref 11.5–14.5)
GFR SERPL CREATININE-BSD FRML MDRD: 63 ML/MIN/1.73
GLUCOSE BLD-MCNC: 255 MG/DL (ref 70–110)
GLUCOSE BLDC GLUCOMTR-MCNC: 178 MG/DL (ref 70–130)
GLUCOSE BLDC GLUCOMTR-MCNC: 265 MG/DL (ref 70–130)
GLUCOSE BLDC GLUCOMTR-MCNC: 287 MG/DL (ref 70–130)
GLUCOSE BLDC GLUCOMTR-MCNC: 292 MG/DL (ref 70–130)
HCO3 BLDA-SCNC: 24 MMOL/L (ref 22–26)
HCT VFR BLD AUTO: 38.3 % (ref 37–47)
HCT VFR BLD CALC: 38 % (ref 37–47)
HGB BLD-MCNC: 11.4 G/DL (ref 12–16)
HGB BLDA-MCNC: 13 G/DL (ref 12–16)
HOROWITZ INDEX BLD+IHG-RTO: 30 %
IMM GRANULOCYTES # BLD: 0.07 10*3/MM3 (ref 0–0.03)
IMM GRANULOCYTES NFR BLD: 0.6 % (ref 0–0.5)
LYMPHOCYTES # BLD AUTO: 2.19 10*3/MM3 (ref 1–3)
LYMPHOCYTES NFR BLD AUTO: 17.5 % (ref 16–46)
MAGNESIUM SERPL-MCNC: 2.4 MG/DL (ref 1.7–2.6)
MCH RBC QN AUTO: 26.8 PG (ref 27–33)
MCHC RBC AUTO-ENTMCNC: 29.8 G/DL (ref 33–37)
MCV RBC AUTO: 89.9 FL (ref 80–94)
METHGB BLD QL: 0.4 % (ref 0–3)
MODALITY: ABNORMAL
MONOCYTES # BLD AUTO: 0.72 10*3/MM3 (ref 0.1–0.9)
MONOCYTES NFR BLD AUTO: 5.7 % (ref 0–12)
NEUTROPHILS # BLD AUTO: 9.03 10*3/MM3 (ref 1.4–6.5)
NEUTROPHILS NFR BLD AUTO: 72.1 % (ref 40–75)
OSMOLALITY SERPL CALC.SUM OF ELEC: 326.9 MOSM/KG (ref 273–305)
OXYHGB MFR BLDV: 93.3 % (ref 85–100)
PCO2 BLDA: 37.4 MM HG (ref 35–45)
PEEP RESPIRATORY: 5 CM[H2O]
PH BLDA: 7.42 PH UNITS (ref 7.35–7.45)
PHOSPHATE SERPL-MCNC: 4.2 MG/DL (ref 2.7–4.5)
PLATELET # BLD AUTO: 327 10*3/MM3 (ref 130–400)
PMV BLD AUTO: 13.2 FL (ref 6–10)
PO2 BLDA: 76.4 MM HG (ref 80–100)
POTASSIUM BLD-SCNC: 4 MMOL/L (ref 3.5–5.3)
PSV: 8 CMH2O
RBC # BLD AUTO: 4.26 10*6/MM3 (ref 4.2–5.4)
SAO2 % BLDCOA: 94.9 % (ref 90–100)
SODIUM BLD-SCNC: 151 MMOL/L (ref 135–153)
VANCOMYCIN TROUGH SERPL-MCNC: 24.3 MCG/ML (ref 5–15)
VENTILATOR MODE: ABNORMAL
WBC NRBC COR # BLD: 12.53 10*3/MM3 (ref 4.5–12.5)

## 2017-06-13 PROCEDURE — 80048 BASIC METABOLIC PNL TOTAL CA: CPT | Performed by: INTERNAL MEDICINE

## 2017-06-13 PROCEDURE — 82962 GLUCOSE BLOOD TEST: CPT

## 2017-06-13 PROCEDURE — 80202 ASSAY OF VANCOMYCIN: CPT | Performed by: INTERNAL MEDICINE

## 2017-06-13 PROCEDURE — 82375 ASSAY CARBOXYHB QUANT: CPT | Performed by: INTERNAL MEDICINE

## 2017-06-13 PROCEDURE — 85025 COMPLETE CBC W/AUTO DIFF WBC: CPT | Performed by: INTERNAL MEDICINE

## 2017-06-13 PROCEDURE — 83735 ASSAY OF MAGNESIUM: CPT | Performed by: INTERNAL MEDICINE

## 2017-06-13 PROCEDURE — 83050 HGB METHEMOGLOBIN QUAN: CPT | Performed by: INTERNAL MEDICINE

## 2017-06-13 PROCEDURE — 97110 THERAPEUTIC EXERCISES: CPT

## 2017-06-13 PROCEDURE — 82805 BLOOD GASES W/O2 SATURATION: CPT | Performed by: INTERNAL MEDICINE

## 2017-06-13 PROCEDURE — 86140 C-REACTIVE PROTEIN: CPT | Performed by: INTERNAL MEDICINE

## 2017-06-13 PROCEDURE — 36600 WITHDRAWAL OF ARTERIAL BLOOD: CPT | Performed by: INTERNAL MEDICINE

## 2017-06-13 PROCEDURE — 84100 ASSAY OF PHOSPHORUS: CPT | Performed by: INTERNAL MEDICINE

## 2017-06-14 ENCOUNTER — APPOINTMENT (OUTPATIENT)
Dept: GENERAL RADIOLOGY | Facility: HOSPITAL | Age: 72
End: 2017-06-14
Attending: INTERNAL MEDICINE

## 2017-06-14 LAB
GLUCOSE BLDC GLUCOMTR-MCNC: 155 MG/DL (ref 70–130)
GLUCOSE BLDC GLUCOMTR-MCNC: 285 MG/DL (ref 70–130)
GLUCOSE BLDC GLUCOMTR-MCNC: 360 MG/DL (ref 70–130)
GLUCOSE BLDC GLUCOMTR-MCNC: 97 MG/DL (ref 70–130)

## 2017-06-14 PROCEDURE — 82962 GLUCOSE BLOOD TEST: CPT

## 2017-06-14 PROCEDURE — 97110 THERAPEUTIC EXERCISES: CPT

## 2017-06-14 PROCEDURE — 97530 THERAPEUTIC ACTIVITIES: CPT

## 2017-06-14 PROCEDURE — 92612 ENDOSCOPY SWALLOW (FEES) VID: CPT

## 2017-06-15 ENCOUNTER — APPOINTMENT (OUTPATIENT)
Dept: GENERAL RADIOLOGY | Facility: HOSPITAL | Age: 72
End: 2017-06-15

## 2017-06-15 LAB
A-A DO2: 87.6 MMHG (ref 0–300)
ANION GAP SERPL CALCULATED.3IONS-SCNC: 4.7 MMOL/L (ref 3.6–11.2)
ATMOSPHERIC PRESS: 727 MMHG
BASE EXCESS BLDV CALC-SCNC: -0.3 MMOL/L
BASOPHILS # BLD AUTO: 0.12 10*3/MM3 (ref 0–0.3)
BASOPHILS NFR BLD AUTO: 1.4 % (ref 0–2)
BDY SITE: ABNORMAL
BODY TEMPERATURE: 98.6 C
BUN BLD-MCNC: 38 MG/DL (ref 7–21)
BUN/CREAT SERPL: 50 (ref 7–25)
CALCIUM SPEC-SCNC: 10.4 MG/DL (ref 7.7–10)
CHLORIDE SERPL-SCNC: 117 MMOL/L (ref 99–112)
CO2 SERPL-SCNC: 28.3 MMOL/L (ref 24.3–31.9)
CREAT BLD-MCNC: 0.76 MG/DL (ref 0.43–1.29)
DEPRECATED RDW RBC AUTO: 44.8 FL (ref 37–54)
EOSINOPHIL # BLD AUTO: 0.47 10*3/MM3 (ref 0–0.7)
EOSINOPHIL NFR BLD AUTO: 5.4 % (ref 0–7)
ERYTHROCYTE [DISTWIDTH] IN BLOOD BY AUTOMATED COUNT: 14.1 % (ref 11.5–14.5)
GFR SERPL CREATININE-BSD FRML MDRD: 75 ML/MIN/1.73
GLUCOSE BLD-MCNC: 152 MG/DL (ref 70–110)
GLUCOSE BLDC GLUCOMTR-MCNC: 108 MG/DL (ref 70–130)
GLUCOSE BLDC GLUCOMTR-MCNC: 146 MG/DL (ref 70–130)
GLUCOSE BLDC GLUCOMTR-MCNC: 165 MG/DL (ref 70–130)
GLUCOSE BLDC GLUCOMTR-MCNC: 197 MG/DL (ref 70–130)
GLUCOSE BLDC GLUCOMTR-MCNC: 219 MG/DL (ref 70–130)
HCO3 BLDV-SCNC: 26.3 MMOL/L
HCT VFR BLD AUTO: 39.1 % (ref 37–47)
HGB BLD-MCNC: 11.5 G/DL (ref 12–16)
HGB BLDA-MCNC: 11.9 G/DL (ref 12–16)
HOROWITZ INDEX BLD+IHG-RTO: 28 %
IMM GRANULOCYTES # BLD: 0.05 10*3/MM3 (ref 0–0.03)
IMM GRANULOCYTES NFR BLD: 0.6 % (ref 0–0.5)
LYMPHOCYTES # BLD AUTO: 1.72 10*3/MM3 (ref 1–3)
LYMPHOCYTES NFR BLD AUTO: 19.6 % (ref 16–46)
MAGNESIUM SERPL-MCNC: 2.5 MG/DL (ref 1.7–2.6)
MCH RBC QN AUTO: 26.5 PG (ref 27–33)
MCHC RBC AUTO-ENTMCNC: 29.4 G/DL (ref 33–37)
MCV RBC AUTO: 90.1 FL (ref 80–94)
MODALITY: ABNORMAL
MONOCYTES # BLD AUTO: 0.66 10*3/MM3 (ref 0.1–0.9)
MONOCYTES NFR BLD AUTO: 7.5 % (ref 0–12)
NEUTROPHILS # BLD AUTO: 5.75 10*3/MM3 (ref 1.4–6.5)
NEUTROPHILS NFR BLD AUTO: 65.5 % (ref 40–75)
OSMOLALITY SERPL CALC.SUM OF ELEC: 310 MOSM/KG (ref 273–305)
PCO2 BLDV: 51.6 MM HG
PH BLDV: 7.33 [PH]
PHOSPHATE SERPL-MCNC: 4.5 MG/DL (ref 2.7–4.5)
PLATELET # BLD AUTO: 256 10*3/MM3 (ref 130–400)
PMV BLD AUTO: 13 FL (ref 6–10)
PO2 BLDV: 41.9 MM HG
POTASSIUM BLD-SCNC: 4 MMOL/L (ref 3.5–5.3)
RBC # BLD AUTO: 4.34 10*6/MM3 (ref 4.2–5.4)
SAO2 % BLDCOV: 72.7 %
SODIUM BLD-SCNC: 150 MMOL/L (ref 135–153)
WBC NRBC COR # BLD: 8.77 10*3/MM3 (ref 4.5–12.5)

## 2017-06-15 PROCEDURE — 97530 THERAPEUTIC ACTIVITIES: CPT | Performed by: OCCUPATIONAL THERAPIST

## 2017-06-15 PROCEDURE — 83735 ASSAY OF MAGNESIUM: CPT | Performed by: INTERNAL MEDICINE

## 2017-06-15 PROCEDURE — 82962 GLUCOSE BLOOD TEST: CPT

## 2017-06-15 PROCEDURE — 85025 COMPLETE CBC W/AUTO DIFF WBC: CPT | Performed by: INTERNAL MEDICINE

## 2017-06-15 PROCEDURE — 84100 ASSAY OF PHOSPHORUS: CPT | Performed by: INTERNAL MEDICINE

## 2017-06-15 PROCEDURE — 71010 XR CHEST 1 VW: CPT | Performed by: RADIOLOGY

## 2017-06-15 PROCEDURE — 82803 BLOOD GASES ANY COMBINATION: CPT | Performed by: INTERNAL MEDICINE

## 2017-06-15 PROCEDURE — 97530 THERAPEUTIC ACTIVITIES: CPT

## 2017-06-15 PROCEDURE — 80048 BASIC METABOLIC PNL TOTAL CA: CPT | Performed by: INTERNAL MEDICINE

## 2017-06-15 PROCEDURE — 71010 HC CHEST PA OR AP: CPT

## 2017-06-15 PROCEDURE — 97110 THERAPEUTIC EXERCISES: CPT

## 2017-06-16 LAB
A-A DO2: 66.1 MMHG (ref 0–300)
A-A DO2: 81.7 MMHG (ref 0–300)
ANION GAP SERPL CALCULATED.3IONS-SCNC: 6.6 MMOL/L (ref 3.6–11.2)
ATMOSPHERIC PRESS: 726 MMHG
ATMOSPHERIC PRESS: 726 MMHG
BASE EXCESS BLDV CALC-SCNC: 0.4 MMOL/L
BASE EXCESS BLDV CALC-SCNC: 1.5 MMOL/L
BDY SITE: ABNORMAL
BDY SITE: ABNORMAL
BODY TEMPERATURE: 98.6 C
BODY TEMPERATURE: 98.6 C
BUN BLD-MCNC: 36 MG/DL (ref 7–21)
BUN/CREAT SERPL: 50 (ref 7–25)
CALCIUM SPEC-SCNC: 10.3 MG/DL (ref 7.7–10)
CHLORIDE SERPL-SCNC: 111 MMOL/L (ref 99–112)
CO2 SERPL-SCNC: 27.4 MMOL/L (ref 24.3–31.9)
CREAT BLD-MCNC: 0.72 MG/DL (ref 0.43–1.29)
EPAP: 4
GFR SERPL CREATININE-BSD FRML MDRD: 80 ML/MIN/1.73
GLUCOSE BLD-MCNC: 113 MG/DL (ref 70–110)
GLUCOSE BLDC GLUCOMTR-MCNC: 105 MG/DL (ref 70–130)
GLUCOSE BLDC GLUCOMTR-MCNC: 140 MG/DL (ref 70–130)
GLUCOSE BLDC GLUCOMTR-MCNC: 143 MG/DL (ref 70–130)
GLUCOSE BLDC GLUCOMTR-MCNC: 149 MG/DL (ref 70–130)
HCO3 BLDV-SCNC: 27.7 MMOL/L
HCO3 BLDV-SCNC: 28.3 MMOL/L
HGB BLDA-MCNC: 11.6 G/DL (ref 12–16)
HGB BLDA-MCNC: 11.9 G/DL (ref 12–16)
HOROWITZ INDEX BLD+IHG-RTO: 25 %
HOROWITZ INDEX BLD+IHG-RTO: 28 %
IPAP: 16
MAGNESIUM SERPL-MCNC: 2.1 MG/DL (ref 1.7–2.6)
MODALITY: ABNORMAL
MODALITY: ABNORMAL
OSMOLALITY SERPL CALC.SUM OF ELEC: 297.8 MOSM/KG (ref 273–305)
PCO2 BLDV: 54.9 MM HG
PCO2 BLDV: 57.6 MM HG
PH BLDV: 7.3 [PH]
PH BLDV: 7.33 [PH]
PHOSPHATE SERPL-MCNC: 4.9 MG/DL (ref 2.7–4.5)
PO2 BLDV: 38.5 MM HG
PO2 BLDV: 40.5 MM HG
POTASSIUM BLD-SCNC: 4.3 MMOL/L (ref 3.5–5.3)
SAO2 % BLDCOV: 69.5 %
SAO2 % BLDCOV: 71.2 %
SET MECH RESP RATE: 14
SODIUM BLD-SCNC: 145 MMOL/L (ref 135–153)

## 2017-06-16 PROCEDURE — 87205 SMEAR GRAM STAIN: CPT | Performed by: INTERNAL MEDICINE

## 2017-06-16 PROCEDURE — 82962 GLUCOSE BLOOD TEST: CPT

## 2017-06-16 PROCEDURE — 82803 BLOOD GASES ANY COMBINATION: CPT | Performed by: INTERNAL MEDICINE

## 2017-06-16 PROCEDURE — 87186 SC STD MICRODIL/AGAR DIL: CPT | Performed by: INTERNAL MEDICINE

## 2017-06-16 PROCEDURE — 80048 BASIC METABOLIC PNL TOTAL CA: CPT | Performed by: INTERNAL MEDICINE

## 2017-06-16 PROCEDURE — 84100 ASSAY OF PHOSPHORUS: CPT | Performed by: INTERNAL MEDICINE

## 2017-06-16 PROCEDURE — 83735 ASSAY OF MAGNESIUM: CPT | Performed by: INTERNAL MEDICINE

## 2017-06-16 PROCEDURE — 97530 THERAPEUTIC ACTIVITIES: CPT

## 2017-06-16 PROCEDURE — 87077 CULTURE AEROBIC IDENTIFY: CPT | Performed by: INTERNAL MEDICINE

## 2017-06-16 PROCEDURE — 97110 THERAPEUTIC EXERCISES: CPT

## 2017-06-16 PROCEDURE — 87070 CULTURE OTHR SPECIMN AEROBIC: CPT | Performed by: INTERNAL MEDICINE

## 2017-06-17 LAB
ATMOSPHERIC PRESS: 723 MMHG
BASE EXCESS BLDV CALC-SCNC: 2.6 MMOL/L
BDY SITE: ABNORMAL
BODY TEMPERATURE: 98.6 C
EPAP: 4
GLUCOSE BLDC GLUCOMTR-MCNC: 121 MG/DL (ref 70–130)
GLUCOSE BLDC GLUCOMTR-MCNC: 126 MG/DL (ref 70–130)
GLUCOSE BLDC GLUCOMTR-MCNC: 148 MG/DL (ref 70–130)
GLUCOSE BLDC GLUCOMTR-MCNC: 158 MG/DL (ref 70–130)
GLUCOSE BLDC GLUCOMTR-MCNC: 182 MG/DL (ref 70–130)
HCO3 BLDV-SCNC: 28.8 MMOL/L
HGB BLDA-MCNC: 11.6 G/DL (ref 12–16)
HOROWITZ INDEX BLD+IHG-RTO: 25 %
IPAP: 14
MODALITY: ABNORMAL
PCO2 BLDV: 52.1 MM HG
PH BLDV: 7.36 [PH]
PO2 BLDV: 36 MM HG
SAO2 % BLDCOV: 68.4 %
TOTAL RATE: 14 BREATHS/MINUTE

## 2017-06-17 PROCEDURE — 82962 GLUCOSE BLOOD TEST: CPT

## 2017-06-17 PROCEDURE — 82803 BLOOD GASES ANY COMBINATION: CPT | Performed by: INTERNAL MEDICINE

## 2017-06-18 LAB
GLUCOSE BLDC GLUCOMTR-MCNC: 128 MG/DL (ref 70–130)
GLUCOSE BLDC GLUCOMTR-MCNC: 132 MG/DL (ref 70–130)
GLUCOSE BLDC GLUCOMTR-MCNC: 139 MG/DL (ref 70–130)
GLUCOSE BLDC GLUCOMTR-MCNC: 76 MG/DL (ref 70–130)
GLUCOSE BLDC GLUCOMTR-MCNC: 78 MG/DL (ref 70–130)

## 2017-06-18 PROCEDURE — 82962 GLUCOSE BLOOD TEST: CPT

## 2017-06-19 ENCOUNTER — APPOINTMENT (OUTPATIENT)
Dept: GENERAL RADIOLOGY | Facility: HOSPITAL | Age: 72
End: 2017-06-19

## 2017-06-19 LAB
ANION GAP SERPL CALCULATED.3IONS-SCNC: 2 MMOL/L (ref 3.6–11.2)
BACTERIA SPEC RESP CULT: ABNORMAL
BASOPHILS # BLD AUTO: 0.06 10*3/MM3 (ref 0–0.3)
BASOPHILS NFR BLD AUTO: 0.9 % (ref 0–2)
BUN BLD-MCNC: 20 MG/DL (ref 7–21)
BUN/CREAT SERPL: 32.3 (ref 7–25)
CALCIUM SPEC-SCNC: 10.3 MG/DL (ref 7.7–10)
CHLORIDE SERPL-SCNC: 108 MMOL/L (ref 99–112)
CO2 SERPL-SCNC: 32 MMOL/L (ref 24.3–31.9)
CREAT BLD-MCNC: 0.62 MG/DL (ref 0.43–1.29)
DEPRECATED RDW RBC AUTO: 40.8 FL (ref 37–54)
EOSINOPHIL # BLD AUTO: 0.48 10*3/MM3 (ref 0–0.7)
EOSINOPHIL NFR BLD AUTO: 7.1 % (ref 0–7)
ERYTHROCYTE [DISTWIDTH] IN BLOOD BY AUTOMATED COUNT: 13.2 % (ref 11.5–14.5)
GFR SERPL CREATININE-BSD FRML MDRD: 95 ML/MIN/1.73
GLUCOSE BLD-MCNC: 143 MG/DL (ref 70–110)
GLUCOSE BLDC GLUCOMTR-MCNC: 110 MG/DL (ref 70–130)
GLUCOSE BLDC GLUCOMTR-MCNC: 161 MG/DL (ref 70–130)
GLUCOSE BLDC GLUCOMTR-MCNC: 195 MG/DL (ref 70–130)
GLUCOSE BLDC GLUCOMTR-MCNC: 84 MG/DL (ref 70–130)
GLUCOSE BLDC GLUCOMTR-MCNC: 85 MG/DL (ref 70–130)
GRAM STN SPEC: ABNORMAL
HCT VFR BLD AUTO: 38.6 % (ref 37–47)
HGB BLD-MCNC: 11.7 G/DL (ref 12–16)
IMM GRANULOCYTES # BLD: 0.05 10*3/MM3 (ref 0–0.03)
IMM GRANULOCYTES NFR BLD: 0.7 % (ref 0–0.5)
LYMPHOCYTES # BLD AUTO: 1.14 10*3/MM3 (ref 1–3)
LYMPHOCYTES NFR BLD AUTO: 16.9 % (ref 16–46)
MAGNESIUM SERPL-MCNC: 1.7 MG/DL (ref 1.7–2.6)
MCH RBC QN AUTO: 26.6 PG (ref 27–33)
MCHC RBC AUTO-ENTMCNC: 30.3 G/DL (ref 33–37)
MCV RBC AUTO: 87.7 FL (ref 80–94)
MONOCYTES # BLD AUTO: 0.48 10*3/MM3 (ref 0.1–0.9)
MONOCYTES NFR BLD AUTO: 7.1 % (ref 0–12)
NEUTROPHILS # BLD AUTO: 4.52 10*3/MM3 (ref 1.4–6.5)
NEUTROPHILS NFR BLD AUTO: 67.3 % (ref 40–75)
OSMOLALITY SERPL CALC.SUM OF ELEC: 288.2 MOSM/KG (ref 273–305)
PHOSPHATE SERPL-MCNC: 3.5 MG/DL (ref 2.7–4.5)
PLATELET # BLD AUTO: 284 10*3/MM3 (ref 130–400)
PMV BLD AUTO: 12.5 FL (ref 6–10)
POTASSIUM BLD-SCNC: 3.8 MMOL/L (ref 3.5–5.3)
RBC # BLD AUTO: 4.4 10*6/MM3 (ref 4.2–5.4)
SODIUM BLD-SCNC: 142 MMOL/L (ref 135–153)
WBC NRBC COR # BLD: 6.73 10*3/MM3 (ref 4.5–12.5)

## 2017-06-19 PROCEDURE — 97110 THERAPEUTIC EXERCISES: CPT

## 2017-06-19 PROCEDURE — 80048 BASIC METABOLIC PNL TOTAL CA: CPT | Performed by: INTERNAL MEDICINE

## 2017-06-19 PROCEDURE — 84100 ASSAY OF PHOSPHORUS: CPT | Performed by: INTERNAL MEDICINE

## 2017-06-19 PROCEDURE — 71010 HC CHEST PA OR AP: CPT

## 2017-06-19 PROCEDURE — 85025 COMPLETE CBC W/AUTO DIFF WBC: CPT | Performed by: INTERNAL MEDICINE

## 2017-06-19 PROCEDURE — 97530 THERAPEUTIC ACTIVITIES: CPT

## 2017-06-19 PROCEDURE — 82962 GLUCOSE BLOOD TEST: CPT

## 2017-06-19 PROCEDURE — 83735 ASSAY OF MAGNESIUM: CPT | Performed by: INTERNAL MEDICINE

## 2017-06-19 PROCEDURE — 71010 XR CHEST 1 VW: CPT | Performed by: RADIOLOGY

## 2017-06-20 ENCOUNTER — APPOINTMENT (OUTPATIENT)
Dept: GENERAL RADIOLOGY | Facility: HOSPITAL | Age: 72
End: 2017-06-20

## 2017-06-20 LAB
GLUCOSE BLDC GLUCOMTR-MCNC: 118 MG/DL (ref 70–130)
GLUCOSE BLDC GLUCOMTR-MCNC: 130 MG/DL (ref 70–130)
GLUCOSE BLDC GLUCOMTR-MCNC: 154 MG/DL (ref 70–130)

## 2017-06-20 PROCEDURE — 97110 THERAPEUTIC EXERCISES: CPT

## 2017-06-20 PROCEDURE — 87186 SC STD MICRODIL/AGAR DIL: CPT | Performed by: INTERNAL MEDICINE

## 2017-06-20 PROCEDURE — 87205 SMEAR GRAM STAIN: CPT | Performed by: INTERNAL MEDICINE

## 2017-06-20 PROCEDURE — 97530 THERAPEUTIC ACTIVITIES: CPT

## 2017-06-20 PROCEDURE — 87077 CULTURE AEROBIC IDENTIFY: CPT | Performed by: INTERNAL MEDICINE

## 2017-06-20 PROCEDURE — 82962 GLUCOSE BLOOD TEST: CPT

## 2017-06-20 PROCEDURE — 87070 CULTURE OTHR SPECIMN AEROBIC: CPT | Performed by: INTERNAL MEDICINE

## 2017-06-20 PROCEDURE — 97116 GAIT TRAINING THERAPY: CPT

## 2017-06-21 ENCOUNTER — APPOINTMENT (OUTPATIENT)
Dept: GENERAL RADIOLOGY | Facility: HOSPITAL | Age: 72
End: 2017-06-21

## 2017-06-21 LAB
ANION GAP SERPL CALCULATED.3IONS-SCNC: 4.6 MMOL/L (ref 3.6–11.2)
BUN BLD-MCNC: 16 MG/DL (ref 7–21)
BUN/CREAT SERPL: 28.6 (ref 7–25)
CALCIUM SPEC-SCNC: 10.2 MG/DL (ref 7.7–10)
CHLORIDE SERPL-SCNC: 103 MMOL/L (ref 99–112)
CO2 SERPL-SCNC: 32.4 MMOL/L (ref 24.3–31.9)
CREAT BLD-MCNC: 0.56 MG/DL (ref 0.43–1.29)
GFR SERPL CREATININE-BSD FRML MDRD: 107 ML/MIN/1.73
GLUCOSE BLD-MCNC: 102 MG/DL (ref 70–110)
GLUCOSE BLDC GLUCOMTR-MCNC: 111 MG/DL (ref 70–130)
GLUCOSE BLDC GLUCOMTR-MCNC: 118 MG/DL (ref 70–130)
GLUCOSE BLDC GLUCOMTR-MCNC: 140 MG/DL (ref 70–130)
GLUCOSE BLDC GLUCOMTR-MCNC: 193 MG/DL (ref 70–130)
OSMOLALITY SERPL CALC.SUM OF ELEC: 280.8 MOSM/KG (ref 273–305)
POTASSIUM BLD-SCNC: 3.5 MMOL/L (ref 3.5–5.3)
SODIUM BLD-SCNC: 140 MMOL/L (ref 135–153)

## 2017-06-21 PROCEDURE — 74230 X-RAY XM SWLNG FUNCJ C+: CPT

## 2017-06-21 PROCEDURE — 97116 GAIT TRAINING THERAPY: CPT

## 2017-06-21 PROCEDURE — G8997 SWALLOW GOAL STATUS: HCPCS

## 2017-06-21 PROCEDURE — G8998 SWALLOW D/C STATUS: HCPCS

## 2017-06-21 PROCEDURE — 71010 XR CHEST 1 VW: CPT | Performed by: RADIOLOGY

## 2017-06-21 PROCEDURE — 80048 BASIC METABOLIC PNL TOTAL CA: CPT | Performed by: INTERNAL MEDICINE

## 2017-06-21 PROCEDURE — 82962 GLUCOSE BLOOD TEST: CPT

## 2017-06-21 PROCEDURE — 97530 THERAPEUTIC ACTIVITIES: CPT | Performed by: OCCUPATIONAL THERAPIST

## 2017-06-21 PROCEDURE — 71010 HC CHEST PA OR AP: CPT

## 2017-06-21 PROCEDURE — 92611 MOTION FLUOROSCOPY/SWALLOW: CPT

## 2017-06-21 PROCEDURE — 97530 THERAPEUTIC ACTIVITIES: CPT

## 2017-06-21 PROCEDURE — 97535 SELF CARE MNGMENT TRAINING: CPT | Performed by: OCCUPATIONAL THERAPIST

## 2017-06-21 PROCEDURE — 74230 X-RAY XM SWLNG FUNCJ C+: CPT | Performed by: RADIOLOGY

## 2017-06-21 PROCEDURE — G8996 SWALLOW CURRENT STATUS: HCPCS

## 2017-06-21 NOTE — MBS/VFSS/FEES
Long Term Care - Speech Language Pathology   Swallow Re-Evaluation  Pasquale   MODIFIED BARIUM SWALLOW STUDY     Patient Name: Mayra Hays  : 1945  MRN: [id number]  Today's Date: 2017             Admit Date: 2017    Mayra Hays  presents to the radiology suite this am from [facility] acute care unit  OA11/2S to participate in an instrumental MBS to re-evaluate safety/efficacy of swallowing fnx, determine safest/least restrictive diet, candidacy for upgrade to thin liquids.       Social History     Social History   • Marital status: Other     Spouse name: N/A   • Number of children: N/A   • Years of education: N/A     Occupational History   • Not on file.     Social History Main Topics   • Smoking status: Former Smoker   • Smokeless tobacco: Not on file   • Alcohol use Not on file   • Drug use: Not on file   • Sexual activity: Not on file     Other Topics Concern   • Not on file     Social History Narrative      Diet Orders (active)     Start     Ordered    17 1200  Dietary Nutrition Supplements Magic Cup  Daily With Lunch      17 1647    17 0219  DIET MESSAGE Breakfast- Please send Oatmeal and Buttermilk Lunch- Please send chicken tenders and mashed potatoes, buttermilk ALSO, PLEASE SEND A FEW THICKENED ELIZABETH  Once     Comments:  Breakfast- Please send Oatmeal and Buttermilk  Lunch- Please send chicken tenders and mashed potatoes, buttermilk  ALSO, PLEASE SEND A FEW THICKENED ELIZABETH    17 0221    17 1404  Diet Soft Texture; Ground; Nectar / Syrup Thick  Diet Effective Now     Comments:  Meds whole in puree/nectar. Single presentation.    17 1403        Observed on 2L O2 via NC.     Risks and benefits of the procedure are explained w/ verbalizing understanding/agreement to participate.     Ms. Hays is positioned upright and centered in a soft strap supportive chair to accept multiple po presentations of solid cracker, puree, honey thick, nectar thick, and thin  liquids via spoon, cup and straw, along w/ whole placebo pill in puree. She is able to self feed.     All views are from the lateral plane.     Facial/oral structures are symmetrical upon observation w/o lingual deviation upon protrusion. Oral mucosa are moist, pink and clean. Secretions are clear, thin and well controlled. OROM/SKYLAR is wfl to imitate oral postures. Gag is not assessed. Volitional cough is adequate in intensity, clear in quality, nonproductive. Vocal quality is adequate in intensity, clear in quality w/ intelligible speech. She is a/a and cooperative to participate,  oriented to person, place, and time, follows simple directives, and participates in simple conversational exchanges.     Upon po presentations, adequate bolus anticipation w/ good labial seal for bolus clearance via spoon bowl, cup rim stability and suction via straw.  Bolus formation, manipulation,  and control are wfl w/ rotary mastication pattern. A-p transit is timely w/o oral residue. Tongue base retraction and linguavelar seal are adequate w/o premature spillage. No laryngeal penetration or aspiration before the swallow.      Pharyngeal swallow is timely w/ adequate hyolaryngeal elevation and epiglottic inversion. Pharyngeal contraction is adequate w/o significant residue. Laryngeal penetration of thin liquids during the swallow via cup and straw w/o significant residue. Chin tuck compensatory technique alleviates penetration. No other laryngeal penetration. No aspiration is evidenced before the swallow.     Full esophageal sweep reveals no mucosal abnormalities. Motility is wfl w/o retrograde flow noted.    Visit Dx:   No diagnosis found.  Patient Active Problem List   Diagnosis   • Pneumonia   • Sepsis due to pneumonia     Past Medical History:   Diagnosis Date   • GERD (gastroesophageal reflux disease)    • Hyperlipidemia    • Hypertension      No past surgical history on file.  EDUCATION  The patient has been educated in the  following areas:   Dysphagia (Swallowing Impairment).    Impression:Ms. Hays presents w/ improved swallowing fnx to allow for diet upgrade to regular consistency, thin liquids. She will benefit from chin tuck w/ thin liquids to alleviate mild penetration during the swallow, although no residue or aspiration evidenced today.     SLP Recommendation and Plan    1. Regular diet, thin liquids.   2. Meds whole in puree/thins.   3. Shaun precautions.   4. Oral care protocol.   5. Chin tuck w/ thin liquids.   No further SLP f/u warranted at this time.     D/w pt results and recommendations w/ verbal understanding and agreement.     D/w RNRikki,  results and recommendations w/ verbal understanding and agreement.     Thank you for allowing me to participate in the care of your patient-  Altagracia Chandra M.S., [facility]/SLP                                           Time Calculation:         Time Calculation- SLP       06/21/17 0950          Time Calculation- SLP    SLP Start Time 0805  -MAHNAZ      SLP Stop Time 0930  -MAHNAZ      SLP Time Calculation (min) 85 min  -MAHNAZ      SLP Non-Billable Time (min) 30 min  -MAHNAZ        User Key  (r) = Recorded By, (t) = Taken By, (c) = Cosigned By    Initials Name Provider Type    MAHNAZ Chandra MS CCC-SLP Speech Therapist        Therapy Charges for Today     Code Description Service Date Service Provider Modifiers Qty    58663841017 HC ST SWALLOWING CURRENT STATUS 6/21/2017 Altagracia Chandra MS CCC-SLP GN, CH 1    07470099907 HC ST SWALLOWING PROJECTED 6/21/2017 Altagracia Chandra MS CCC-SLP FAUSTINO,  1    56752222005 HC ST SWALLOWING DISCHARGE 6/21/2017 Altagracia Chandra MS CCC-SLP GN,  1    66996225436 HC ST MOTION FLUORO EVAL SWALLOW 6 6/21/2017 Altagracia Chandra MS DEVIN-SLP GN 1        SLP G-Codes  SLP NOMS Used?: Yes  Functional Limitations: Swallowing  Swallow Current Status (): 0 percent impaired, limited or restricted  Swallow Goal Status (): 0 percent impaired, limited or  restricted  Swallow Discharge Status (): 0 percent impaired, limited or restricted    Altagracia Chandra, MS CCC-SLP  6/21/2017

## 2017-06-22 LAB
GLUCOSE BLDC GLUCOMTR-MCNC: 105 MG/DL (ref 70–130)
GLUCOSE BLDC GLUCOMTR-MCNC: 106 MG/DL (ref 70–130)
GLUCOSE BLDC GLUCOMTR-MCNC: 137 MG/DL (ref 70–130)
GLUCOSE BLDC GLUCOMTR-MCNC: 175 MG/DL (ref 70–130)

## 2017-06-22 PROCEDURE — 97530 THERAPEUTIC ACTIVITIES: CPT

## 2017-06-22 PROCEDURE — 82962 GLUCOSE BLOOD TEST: CPT

## 2017-06-22 PROCEDURE — 97116 GAIT TRAINING THERAPY: CPT

## 2017-06-23 LAB
ANION GAP SERPL CALCULATED.3IONS-SCNC: 3.4 MMOL/L (ref 3.6–11.2)
BACTERIA SPEC RESP CULT: ABNORMAL
BUN BLD-MCNC: 22 MG/DL (ref 7–21)
BUN/CREAT SERPL: 29.7 (ref 7–25)
CALCIUM SPEC-SCNC: 9.5 MG/DL (ref 7.7–10)
CHLORIDE SERPL-SCNC: 105 MMOL/L (ref 99–112)
CO2 SERPL-SCNC: 31.6 MMOL/L (ref 24.3–31.9)
CREAT BLD-MCNC: 0.74 MG/DL (ref 0.43–1.29)
GFR SERPL CREATININE-BSD FRML MDRD: 78 ML/MIN/1.73
GLUCOSE BLD-MCNC: 99 MG/DL (ref 70–110)
GLUCOSE BLDC GLUCOMTR-MCNC: 147 MG/DL (ref 70–130)
GLUCOSE BLDC GLUCOMTR-MCNC: 156 MG/DL (ref 70–130)
GLUCOSE BLDC GLUCOMTR-MCNC: 227 MG/DL (ref 70–130)
GLUCOSE BLDC GLUCOMTR-MCNC: 86 MG/DL (ref 70–130)
GRAM STN SPEC: ABNORMAL
OSMOLALITY SERPL CALC.SUM OF ELEC: 282.8 MOSM/KG (ref 273–305)
POTASSIUM BLD-SCNC: 3.9 MMOL/L (ref 3.5–5.3)
SODIUM BLD-SCNC: 140 MMOL/L (ref 135–153)

## 2017-06-23 PROCEDURE — 97530 THERAPEUTIC ACTIVITIES: CPT

## 2017-06-23 PROCEDURE — 82962 GLUCOSE BLOOD TEST: CPT

## 2017-06-23 PROCEDURE — 97116 GAIT TRAINING THERAPY: CPT

## 2017-06-23 PROCEDURE — 80048 BASIC METABOLIC PNL TOTAL CA: CPT | Performed by: INTERNAL MEDICINE

## 2017-06-24 LAB
ANION GAP SERPL CALCULATED.3IONS-SCNC: 4.3 MMOL/L (ref 3.6–11.2)
BUN BLD-MCNC: 22 MG/DL (ref 7–21)
BUN/CREAT SERPL: 28.2 (ref 7–25)
CALCIUM SPEC-SCNC: 9.5 MG/DL (ref 7.7–10)
CHLORIDE SERPL-SCNC: 103 MMOL/L (ref 99–112)
CO2 SERPL-SCNC: 30.7 MMOL/L (ref 24.3–31.9)
CREAT BLD-MCNC: 0.78 MG/DL (ref 0.43–1.29)
GFR SERPL CREATININE-BSD FRML MDRD: 73 ML/MIN/1.73
GLUCOSE BLD-MCNC: 94 MG/DL (ref 70–110)
GLUCOSE BLDC GLUCOMTR-MCNC: 171 MG/DL (ref 70–130)
GLUCOSE BLDC GLUCOMTR-MCNC: 176 MG/DL (ref 70–130)
GLUCOSE BLDC GLUCOMTR-MCNC: 182 MG/DL (ref 70–130)
GLUCOSE BLDC GLUCOMTR-MCNC: 97 MG/DL (ref 70–130)
OSMOLALITY SERPL CALC.SUM OF ELEC: 278.8 MOSM/KG (ref 273–305)
POTASSIUM BLD-SCNC: 3.8 MMOL/L (ref 3.5–5.3)
SODIUM BLD-SCNC: 138 MMOL/L (ref 135–153)

## 2017-06-24 PROCEDURE — 80048 BASIC METABOLIC PNL TOTAL CA: CPT | Performed by: INTERNAL MEDICINE

## 2017-06-24 PROCEDURE — 82962 GLUCOSE BLOOD TEST: CPT

## 2017-06-25 LAB
ANION GAP SERPL CALCULATED.3IONS-SCNC: 6.2 MMOL/L (ref 3.6–11.2)
BUN BLD-MCNC: 24 MG/DL (ref 7–21)
BUN/CREAT SERPL: 26.1 (ref 7–25)
CALCIUM SPEC-SCNC: 9.4 MG/DL (ref 7.7–10)
CHLORIDE SERPL-SCNC: 101 MMOL/L (ref 99–112)
CO2 SERPL-SCNC: 30.8 MMOL/L (ref 24.3–31.9)
CREAT BLD-MCNC: 0.92 MG/DL (ref 0.43–1.29)
GFR SERPL CREATININE-BSD FRML MDRD: 60 ML/MIN/1.73
GLUCOSE BLD-MCNC: 104 MG/DL (ref 70–110)
GLUCOSE BLDC GLUCOMTR-MCNC: 142 MG/DL (ref 70–130)
GLUCOSE BLDC GLUCOMTR-MCNC: 158 MG/DL (ref 70–130)
GLUCOSE BLDC GLUCOMTR-MCNC: 189 MG/DL (ref 70–130)
GLUCOSE BLDC GLUCOMTR-MCNC: 99 MG/DL (ref 70–130)
OSMOLALITY SERPL CALC.SUM OF ELEC: 280 MOSM/KG (ref 273–305)
POTASSIUM BLD-SCNC: 3.9 MMOL/L (ref 3.5–5.3)
SODIUM BLD-SCNC: 138 MMOL/L (ref 135–153)

## 2017-06-25 PROCEDURE — 80048 BASIC METABOLIC PNL TOTAL CA: CPT | Performed by: INTERNAL MEDICINE

## 2017-06-25 PROCEDURE — 82962 GLUCOSE BLOOD TEST: CPT

## 2017-06-26 LAB
GLUCOSE BLDC GLUCOMTR-MCNC: 113 MG/DL (ref 70–130)
GLUCOSE BLDC GLUCOMTR-MCNC: 134 MG/DL (ref 70–130)
GLUCOSE BLDC GLUCOMTR-MCNC: 162 MG/DL (ref 70–130)
GLUCOSE BLDC GLUCOMTR-MCNC: 181 MG/DL (ref 70–130)

## 2017-06-26 PROCEDURE — 97116 GAIT TRAINING THERAPY: CPT

## 2017-06-26 PROCEDURE — 82962 GLUCOSE BLOOD TEST: CPT

## 2017-06-26 PROCEDURE — 97530 THERAPEUTIC ACTIVITIES: CPT

## 2017-06-27 LAB
GLUCOSE BLDC GLUCOMTR-MCNC: 119 MG/DL (ref 70–130)
GLUCOSE BLDC GLUCOMTR-MCNC: 120 MG/DL (ref 70–130)
GLUCOSE BLDC GLUCOMTR-MCNC: 201 MG/DL (ref 70–130)

## 2017-06-27 PROCEDURE — 82962 GLUCOSE BLOOD TEST: CPT

## 2017-06-30 RX ORDER — BLOOD-GLUCOSE METER
1 KIT MISCELLANEOUS AS NEEDED
Qty: 1 EACH | Refills: 3 | Status: SHIPPED | OUTPATIENT
Start: 2017-06-30 | End: 2017-07-20 | Stop reason: ALTCHOICE

## 2017-07-05 LAB
BACTERIA SPEC AEROBE CULT: NORMAL
FUNGUS SPEC CULT: NORMAL
FUNGUS SPEC FUNGUS STN: NORMAL

## 2017-07-19 LAB
ACID FAST STN SPEC: NEGATIVE
BACTERIA SPEC AEROBE CULT: NEGATIVE
SPECIMEN PREPARATION: NORMAL

## 2017-07-20 ENCOUNTER — OFFICE VISIT (OUTPATIENT)
Dept: CARDIOLOGY | Facility: CLINIC | Age: 72
End: 2017-07-20

## 2017-07-20 VITALS
SYSTOLIC BLOOD PRESSURE: 136 MMHG | HEIGHT: 68 IN | BODY MASS INDEX: 33.52 KG/M2 | HEART RATE: 59 BPM | WEIGHT: 221.2 LBS | DIASTOLIC BLOOD PRESSURE: 68 MMHG | RESPIRATION RATE: 16 BRPM

## 2017-07-20 DIAGNOSIS — E11.9 TYPE 2 DIABETES MELLITUS WITHOUT COMPLICATION, WITHOUT LONG-TERM CURRENT USE OF INSULIN (HCC): ICD-10-CM

## 2017-07-20 DIAGNOSIS — I50.32 CHRONIC DIASTOLIC HEART FAILURE (HCC): ICD-10-CM

## 2017-07-20 DIAGNOSIS — R07.9 CHEST PAIN IN ADULT: Primary | ICD-10-CM

## 2017-07-20 DIAGNOSIS — R77.8 ELEVATED TROPONIN: ICD-10-CM

## 2017-07-20 PROBLEM — J41.0 SIMPLE CHRONIC BRONCHITIS: Status: ACTIVE | Noted: 2017-07-20

## 2017-07-20 PROBLEM — I10 ESSENTIAL HYPERTENSION: Status: ACTIVE | Noted: 2017-07-20

## 2017-07-20 PROBLEM — R79.89 ELEVATED TROPONIN: Status: ACTIVE | Noted: 2017-07-20

## 2017-07-20 PROCEDURE — 99204 OFFICE O/P NEW MOD 45 MIN: CPT | Performed by: INTERNAL MEDICINE

## 2017-07-20 PROCEDURE — 93000 ELECTROCARDIOGRAM COMPLETE: CPT | Performed by: INTERNAL MEDICINE

## 2017-07-20 RX ORDER — NITROGLYCERIN 0.4 MG/1
0.4 TABLET SUBLINGUAL
COMMUNITY
End: 2017-09-14

## 2017-07-20 RX ORDER — LISINOPRIL 5 MG/1
5 TABLET ORAL DAILY
COMMUNITY
End: 2018-06-27 | Stop reason: HOSPADM

## 2017-07-20 RX ORDER — MAGNESIUM OXIDE 400 MG/1
400 TABLET ORAL 2 TIMES DAILY
COMMUNITY
End: 2018-06-16 | Stop reason: HOSPADM

## 2017-07-20 RX ORDER — METHIMAZOLE 5 MG/1
5 TABLET ORAL 3 TIMES DAILY
Status: ON HOLD | COMMUNITY
End: 2018-06-13

## 2017-07-20 RX ORDER — FERROUS SULFATE 325(65) MG
325 TABLET ORAL
Status: ON HOLD | COMMUNITY
End: 2019-01-26

## 2017-07-20 RX ORDER — FUROSEMIDE 20 MG/1
20 TABLET ORAL DAILY
COMMUNITY
End: 2018-06-27 | Stop reason: HOSPADM

## 2017-07-20 RX ORDER — PANTOPRAZOLE SODIUM 40 MG/1
40 TABLET, DELAYED RELEASE ORAL DAILY
Status: ON HOLD | COMMUNITY
End: 2017-08-22

## 2017-07-20 RX ORDER — ALBUTEROL SULFATE 2.5 MG/3ML
2.5 SOLUTION RESPIRATORY (INHALATION) EVERY 4 HOURS PRN
Status: ON HOLD | COMMUNITY
End: 2018-06-13

## 2017-07-20 RX ORDER — ATENOLOL 25 MG/1
25 TABLET ORAL 2 TIMES DAILY
COMMUNITY
End: 2017-09-14 | Stop reason: SDUPTHER

## 2017-07-20 NOTE — PROGRESS NOTES
ERASTO Tomlinson  Mayra Hays  1945 07/20/2017    Problem list:   History of acute hypoxic and hypercapnic respiratory failure and sepsis due to right middle lobe and right lower lobe pneumonia in May/June 2017.  -Primary respiratory acidosis and a secondary compensatory metabolic alkalosis, now improved  -Bilateral pleural effusions  -Coagulative negative staphylococcus in one blood culture that could be a contaminant  -Tachybrady syndrome  -Lactic acidosis that has resolved  -Prolonged QTc of 559 ms, down to 511 ms by 6/2/2017   -Sinus bradycardia  -Elevated troponins concerning for type 2 myocardial infarction/NSTEMI, but Dr. Awan does not think that she had a NSTEMI  -Type 2 diabetes mellitus (Ha1c 7.3 this admission), newly diagnosed this admission  -Diastolic congestive heart failure with a mild exacerbation due to needed medical care, now resolved  -Mild pulmonary hypertension  -Acute COPD exacerbation, improved  -Elevated liver enzymes, resolved  -Morbid obesity  -Possible acute renal failure on top of chronic kidney disease Stage 3 (baseline Cr now 0.6 and previously thought to be 0.94-1.02)  --Past heavy tobacco smoking addiction        Dear ERASTO Tomlinson:    Subjective     Chief complaint: Recent non-ST elevation MI and diastolic heart failure.    History of Present Illness: Ms. Hays is a pleasant 71-year-old  female with history of COPD with acute hypoxic/hypercapnic respiratory failure due to right lower lobe pneumonia in May/June 2017.  She apparently also had some diastolic heart failure at that time.  She had a mild bump in her troponin up to 0.7.  She also had tachybradycardia syndrome at that time.  She has been now referred by her PCP ERASTO Martínez for further cardiac evaluation and management.  On today's visit, Ms. Hays denies any complaints of substernal chest pain or left-sided chest pains.  She has some intermittent right axillary chest pains.   Her breathing apparently has significantly improved and she is currently breathing okay without any  oxygen on.  She does not have any cough or fever.  Overall she's been doing a lot better.  She tends to run slow heart rate in the 50s but she denies any history of dizziness or syncope.    Cardiac risk factors:diabetes mellitus, hypercholesterolemia, hypertension, smoking, Obesity and Age and postmenopausal status.    No Known Allergies:      Current Outpatient Prescriptions:   •  albuterol (PROVENTIL) (2.5 MG/3ML) 0.083% nebulizer solution, Take 2.5 mg by nebulization Every 4 (Four) Hours As Needed for Wheezing., Disp: , Rfl:   •  aspirin 325 MG tablet, Take 1 tablet by mouth Daily., Disp: , Rfl:   •  atenolol (TENORMIN) 25 MG tablet, Take 25 mg by mouth 2 (Two) Times a Day., Disp: , Rfl:   •  atorvastatin (LIPITOR) 20 MG tablet, Take 1 tablet by mouth Every Night., Disp: , Rfl:   •  ferrous sulfate 325 (65 FE) MG tablet, Take 325 mg by mouth 2 (Two) Times a Day., Disp: , Rfl:   •  furosemide (LASIX) 20 MG tablet, Take 20 mg by mouth Daily., Disp: , Rfl:   •  lisinopril (PRINIVIL,ZESTRIL) 5 MG tablet, Take 5 mg by mouth Daily., Disp: , Rfl:   •  magnesium oxide (MAG-OX) 400 MG tablet, Take 400 mg by mouth 2 (Two) Times a Day., Disp: , Rfl:   •  metFORMIN (GLUCOPHAGE) 500 MG tablet, Take 500 mg by mouth 2 (Two) Times a Day With Meals., Disp: , Rfl:   •  methIMAzole (TAPAZOLE) 5 MG tablet, Take 5 mg by mouth 3 (Three) Times a Day., Disp: , Rfl:   •  nitroglycerin (NITROSTAT) 0.4 MG SL tablet, Place 0.4 mg under the tongue Every 5 (Five) Minutes As Needed for Chest Pain. Take no more than 3 doses in 15 minutes., Disp: , Rfl:   •  pantoprazole (PROTONIX) 40 MG EC tablet, Take 40 mg by mouth Daily., Disp: , Rfl:   •  potassium chloride (KLOR-CON) 20 MEQ packet, Take 40 mEq by mouth As Needed (potassium replacement, see admin instructions)., Disp: , Rfl:   •  magnesium sulfate 2 GM/50ML infusion, Infuse 50 mL into a  venous catheter As Needed (Mg less than or equal to 1mg/dL)., Disp: , Rfl:   •  magnesium sulfate 4 GM/100ML infusion, Infuse 100 mL into a venous catheter As Needed (Mg 1.6-1.9 mg/dL)., Disp: , Rfl:   •  metoclopramide (REGLAN) 5 MG/ML injection, Infuse 1 mL into a venous catheter 3 (Three) Times a Day., Disp: , Rfl:   •  metroNIDAZOLE (FLAGYL) 5-0.79 MG/ML-% IVPB, Infuse 100 mL into a venous catheter Every 8 (Eight) Hours. Indications: Pneumonia, Disp: , Rfl: 0    Past Medical History:   Diagnosis Date   • Collapsed lung    • GERD (gastroesophageal reflux disease)    • Hyperlipidemia    • Hypertension    • Myocardial infarction      No past surgical history on file.  No family history on file.  Social History   Substance Use Topics   • Smoking status: Former Smoker   • Smokeless tobacco: Not on file   • Alcohol use Not on file       Review of Systems   Constitution: Negative for diaphoresis, fever, weakness, malaise/fatigue, weight gain and weight loss.   HENT: Negative for congestion, ear discharge, ear pain, headaches, hearing loss, hoarse voice, nosebleeds, sore throat and tinnitus.    Eyes: Negative for blurred vision, discharge, double vision and pain.   Cardiovascular: Positive for leg swelling. Negative for chest pain, dyspnea on exertion, irregular heartbeat and palpitations.   Respiratory: Positive for shortness of breath. Negative for cough, hemoptysis and wheezing.    Endocrine: Negative for cold intolerance, heat intolerance, polydipsia, polyphagia and polyuria.   Hematologic/Lymphatic: Negative for bleeding problem. Does not bruise/bleed easily.   Skin: Negative for color change, dry skin, flushing, itching and rash.   Musculoskeletal: Positive for joint swelling. Negative for arthritis, back pain, joint pain, muscle cramps, muscle weakness, neck pain and stiffness.   Gastrointestinal: Negative for abdominal pain, change in bowel habit, constipation, diarrhea, heartburn, nausea and vomiting.  "  Genitourinary: Negative for bladder incontinence, decreased libido, dysuria, frequency and hematuria.   Neurological: Negative for disturbances in coordination, dizziness, focal weakness, light-headedness, loss of balance, numbness, paresthesias and tremors.   Psychiatric/Behavioral: Positive for memory loss. Negative for altered mental status and depression. The patient does not have insomnia.    Allergic/Immunologic: Negative for hives.       Objective   Blood pressure 136/68, pulse 59, resp. rate 16, height 68\" (172.7 cm), weight 221 lb 3.2 oz (100 kg).  Body mass index is 33.63 kg/(m^2).        Physical Exam   Constitutional: She is oriented to person, place, and time. She appears well-developed and well-nourished.   HENT:   Head: Normocephalic.   Eyes: Conjunctivae and EOM are normal.   Neck: Normal range of motion. Neck supple. No JVD present. No tracheal deviation present. No thyromegaly present.   Cardiovascular: Normal rate and regular rhythm.  Exam reveals no gallop and no friction rub.    No murmur heard.  Pulmonary/Chest: Breath sounds normal. No respiratory distress. She has no wheezes. She has no rales.   Abdominal: Soft. Bowel sounds are normal. She exhibits no mass. There is no tenderness.   Musculoskeletal: She exhibits edema.   Neurological: She is alert and oriented to person, place, and time. No cranial nerve deficit.   Skin: Skin is warm and dry. Erythema: 2+ edema on both legs.   Psychiatric: She has a normal mood and affect.       Lab Results   Component Value Date     06/25/2017    K 3.9 06/25/2017     06/25/2017    CO2 30.8 06/25/2017    BUN 24 (H) 06/25/2017    CREATININE 0.92 06/25/2017    GLUCOSE 104 06/25/2017    CALCIUM 9.4 06/25/2017    AST 17 06/09/2017    ALT 25 06/09/2017    ALKPHOS 64 06/09/2017    LABIL2 1.0 (L) 06/09/2017     Lab Results   Component Value Date    CKTOTAL 22 (L) 06/02/2017     Lab Results   Component Value Date    WBC 6.73 06/19/2017    HGB 11.7 (L) " 06/19/2017    HCT 38.6 06/19/2017     06/19/2017     Lab Results   Component Value Date    INR 1.02 06/01/2017    INR 1.03 05/31/2017    INR 0.98 05/31/2017     Lab Results   Component Value Date    MG 1.7 06/19/2017     Lab Results   Component Value Date    TRIG 290 (H) 06/09/2017    HDL 25 (L) 06/09/2017      Lab Results   Component Value Date    BNP 89.0 06/01/2017     Echo Doppler study recently revealed:  · The study is technically difficult for diagnosis.Poor echo windows. On ventilator  · Left ventricular systolic function is normal. Estimated EF=55-60%  · All left ventricular wall segments contract normally  · Left ventricular diastolic dysfunction (grade II) consistent with pseudonormalization.  · The aortic valve is structurally normal. No aortic valve regurgitation is present. No aortic valve stenosis is present  · Trace-to-mild mitral valve regurgitation is present  · Mild tricuspid valve regurgitation is present.  · Mild pulmonary hypertension is present.  · The pulmonic valve was not well visualized  · There is no evidence of pericardial effusion          ECG 12 Lead  Date/Time: 7/20/2017 12:31 PM  Performed by: RAUDEL SHAH  Authorized by: RAUDEL SHAH               Assessment/Plan   Diagnoses and all orders for this visit:    1. Chest pain in adult  2. Elevated troponin  3. Chronic diastolic heart failure  4. Type 2 diabetes mellitus (NIDDM)      Recommendations:    1. Continue with aspirin but decrease the dose to 81 mg daily.  Continue with atorvastatin, atenolol and lisinopril and furosemide.  2. Since she has multiple risk factors for coronary artery disease, although this was a type II MI, cannot exclude occult myocardial ischemia/coronary artery disease.  3. We'll evaluate further for myocardial ischemia with Lexiscan sestamibi study.  4. Follow-up in 4-5 weeks.    Return in about 5 weeks (around 8/24/2017).    As always, I appreciate very much the opportunity to participate in the  cardiovascular care of your patients.      With Best Regards,    Jatinder Matias MD, [facility]    Dragon disclaimer:  Much of this encounter note is an electronic transcription/translation of spoken language to printed text. The electronic translation of spoken language may permit erroneous, or at times, nonsensical words or phrases to be inadvertently transcribed; Although I have reviewed the note for such errors, some may still exist.

## 2017-08-03 ENCOUNTER — OFFICE VISIT (OUTPATIENT)
Dept: PULMONOLOGY | Facility: CLINIC | Age: 72
End: 2017-08-03

## 2017-08-03 VITALS
BODY MASS INDEX: 36.82 KG/M2 | HEIGHT: 65 IN | OXYGEN SATURATION: 92 % | HEART RATE: 60 BPM | DIASTOLIC BLOOD PRESSURE: 82 MMHG | TEMPERATURE: 97.4 F | SYSTOLIC BLOOD PRESSURE: 164 MMHG | WEIGHT: 221 LBS

## 2017-08-03 DIAGNOSIS — J41.0 SIMPLE CHRONIC BRONCHITIS (HCC): Primary | ICD-10-CM

## 2017-08-03 DIAGNOSIS — I50.32 CHRONIC DIASTOLIC CONGESTIVE HEART FAILURE (HCC): ICD-10-CM

## 2017-08-03 DIAGNOSIS — J90 PLEURAL EFFUSION: ICD-10-CM

## 2017-08-03 DIAGNOSIS — G47.33 OSA (OBSTRUCTIVE SLEEP APNEA): ICD-10-CM

## 2017-08-03 PROCEDURE — 99214 OFFICE O/P EST MOD 30 MIN: CPT | Performed by: INTERNAL MEDICINE

## 2017-08-03 NOTE — PROGRESS NOTES
"Subjective   Mayra Hays is a 72 y.o. female who is being seen for COPD    History of Present Illness   This 72-year-old female has been referred to us by her primary care provider for evaluation of ongoing shortness of breath.  Patient is accompanied by her daughter-in-law who also participated in the discussion.  Patient tells me that she was recently hospitalized with acute shortness of breath, was admitted to intensive care unit and had to be on mechanical ventilation she was treated for pneumonia, presumed to be aspiration pneumonia, was successfully extubated and was sent home on home O2.  She has recovered pretty well and essentially went back to her baseline except that she is much more weaker now than before.  Her daughter-in-law tells me that she is out of breath with mild exertion and has nonproductive cough time to time.  Past Medical History:   Diagnosis Date   • Collapsed lung    • GERD (gastroesophageal reflux disease)    • Hyperlipidemia    • Hypertension    • Myocardial infarction      No past surgical history on file.  History reviewed. No pertinent family history.   reports that she has quit smoking. She does not have any smokeless tobacco history on file.  No Known Allergies        The following portions of the patient's history were reviewed and updated as appropriate: allergies, current medications, past family history, past medical history, past social history, past surgical history and problem list.    Review of Systems   Constitutional: Positive for fatigue.   HENT: Positive for congestion.    Respiratory: Positive for cough, chest tightness, shortness of breath and wheezing.    Cardiovascular: Positive for leg swelling.   Allergic/Immunologic: Positive for environmental allergies.   All other systems reviewed and are negative.      Objective   /82 (BP Location: Left arm, Patient Position: Sitting, Cuff Size: Adult)  Pulse 60  Temp 97.4 °F (36.3 °C) (Oral)   Ht 65\" (165.1 cm)  Wt " 221 lb (100 kg)  SpO2 92%  BMI 36.78 kg/m2  Physical Exam   Constitutional: She is oriented to person, place, and time.   HENT:   Head: Normocephalic and atraumatic.   Eyes: EOM are normal. Pupils are equal, round, and reactive to light.   Neck: Neck supple.   Cardiovascular: Normal rate and regular rhythm.  PMI is displaced.    Murmur heard.   Systolic murmur is present with a grade of 2/6   murmur at mitral area   Pulmonary/Chest: She has rales.   Bibasilar rales. Few distant and scattered rhonchi heard posteriorly    Abdominal: Soft. Bowel sounds are normal.   Musculoskeletal: She exhibits edema. She exhibits no deformity.    1-2+ pitting leg edema bilaterally   Neurological: She is alert and oriented to person, place, and time.   Skin: Skin is warm and dry.   Psychiatric: She has a normal mood and affect. Her behavior is normal.   Nursing note and vitals reviewed.        Radiology:  Xr Chest 1 View    Result Date: 6/21/2017  EXAMINATION:  XR CHEST 1 VW-       CLINICAL INDICATION:     resp failure/shortness of breath      TECHNIQUE:  XR CHEST 1 VW-        COMPARISON: 6/19/2017       FINDINGS:   Bibasilar atelectasis or minimal airspace disease.   Heart size is stable.   No pneumothorax.   Small left pleural effusion. Tiny right pleural effusion.   Bony and soft tissue structures are unremarkable.          IMPRESSION:  Stable radiographic appearance of the chest.      This report was finalized on 6/21/2017 8:26 AM by Dr. Bill Parham MD.  Stable radiographic appearance of the chest.  This report was finalized on 6/21/2017 8:26 AM by Dr. Bill Parham MD.      Xr Chest 1 View    Result Date: 6/19/2017  Radiographic changes of congestive failure.  This report was finalized on 6/19/2017 1:36 PM by Dr. Bill Parham MD.      Xr Chest 1 View    Result Date: 6/15/2017  No significant change in the cardiopulmonary status after removal of the endotracheal tube. Parenchymal infiltrate persists in the left lung base.   This report was finalized on 6/15/2017 9:13 AM by Dr. Jose Manuel Ruiz II, MD.      Xr Chest 1 View    Result Date: 6/12/2017  Overall, interval improvement.     This report was finalized on 6/12/2017 10:09 AM by Dr. Mauricio Santigao MD.      Xr Chest 1 View    Result Date: 6/8/2017  Trace bibasilar effusions and bibasilar consolidation     This report was finalized on 6/8/2017 4:11 PM by Dr. Mauricio Santiago MD.      Xr Chest 1 View    Result Date: 6/5/2017  NG tube in stomach.     This report was finalized on 6/5/2017 8:42 AM by Dr. Mauricio Santiago MD.      Xr Chest 1 View    Result Date: 6/1/2017  Worsened bilateral airspace disease.  This report was finalized on 6/1/2017 6:17 AM by Dr. Bill Parham MD.      Xr Chest 1 View    Result Date: 5/31/2017  ET TUBE BELOW THE LEVEL OF CLAVICLES. NG TUBE IN THE STOMACH. RIGHT LOWER LOBE PNEUMONIA.  This report was finalized on 5/31/2017 6:54 AM by Dr. Bill Parham MD.      Xr Chest Ap    Result Date: 6/7/2017  Slight interval improvement.     This report was finalized on 6/7/2017 9:45 AM by Dr. Mauricio Santiago MD.      Xr Chest Ap    Result Date: 6/6/2017  Bibasilar effusions and right basilar consolidation.     This report was finalized on 6/6/2017 9:35 AM by Dr. Mauricio Santiago MD.      Xr Chest Ap    Result Date: 6/5/2017  Mild congestive heart failure and bilateral consolidation.     This report was finalized on 6/5/2017 8:15 AM by Dr. Mauricio Santiago MD.      Xr Chest Ap    Result Date: 6/4/2017  As above.  This report was finalized on 6/4/2017 9:03 AM by Dr. Bill Parham MD.      Xr Chest Ap    Result Date: 6/3/2017  Right PICC line now with tip in SVC.  This report was finalized on 6/3/2017 8:47 AM by Dr. Bill Parham MD.      Fl Video Swallow    Result Date: 6/21/2017  Laryngeal penetration of thin liquids during the swallow w/ cup and straw, clearing upon completion of the swallow w/o significant residue. Compensatory chin tuck is effective to alleviate penetration. No other  laryngeal penetration across this evaluation. No aspiration.  This report was finalized on 2017 9:50 AM by Dr. Bill Parham MD.    Reading physician: Yin Mae MD Ordering physician: Todd Trammell MD Study date: 17   Patient Information   Patient Name MRN Sex  (Age)   Mayra Hays [id number] Female 1945 (72 y.o.)   Sedation Narrator Report   Sedation Narrator Report   Interpretation Summary   · The study is technically difficult for diagnosis.Poor echo windows. On ventilator  · Left ventricular systolic function is normal. Estimated EF=55-60%  · All left ventricular wall segments contract normally  · Left ventricular diastolic dysfunction (grade II) consistent with pseudonormalization.  · The aortic valve is structurally normal. No aortic valve regurgitation is present. No aortic valve stenosis is present  · Trace-to-mild mitral valve regurgitation is present  · Mild tricuspid valve regurgitation is present.  · Mild pulmonary hypertension is present.  · The pulmonic valve was not well visualized  · There is no evidence of pericardial effusion         Lab Results:  Admission on 2017, Discharged on 2017   No results displayed because visit has over 200 results.          Assessment      ICD-10-CM ICD-9-CM   1. Simple chronic bronchitis J41.0 491.0   2. Chronic diastolic congestive heart failure I50.32 428.32     428.0   3. OSWALDO (obstructive sleep apnea) G47.33 327.23   4. Pleural effusion J90 511.9                DISCUSSION:  I have reviewed the hospital record and multiple chest x-rays available with echocardiogram.  Her chest x-ray reveals bilateral pleural effusion, more on the left side, the last one was done on 17 revealing less but persistent effusion more on the left side along with atelectasis.  Her echocardiogram revealed normal systolic function but grade 2 diastolic dysfunction.    This patient has a complex presentation.  She has ongoing shortness of breath on  mild exertion, chest x-ray reveals essentially persistent pleural effusion more on the left side, she has pedal edema and an echocardiogram shows grade 2 diastolic is function.  I believe her current symptomatology is secondary to a combination of conditions including underlying obstructive airway disease, congestive heart failure and sleep apnea with nocturnal hypoxemia.  I had a long discussion with her and her daughter-in-law, explained to them our working diagnosis and differential diagnosis is and also outlined the management.  I would send her for a pulmonary function test for objective assessment of her airways function, a chest CT without contrast to see if she has any associated parenchymal abnormalities which may be accounting for her pleural effusion.  I also like to do a nocturnal polysomnography to see if she has coexisting obstructive sleep apnea.    I would see her again after these tests and further management plan will depend on the findings in the meantime she should continue all of her current medications as she is taking.    Plan    Orders Placed This Encounter   Procedures   • CT Chest With Contrast   • Ambulatory Referral to Sleep Medicine   • Pulmonary Function Test     No orders of the defined types were placed in this encounter.                 Hemalatha Brewer MD, FCCP, FAASM  Pulmonary, Critical Care, and Sleep Medicine

## 2017-08-09 ENCOUNTER — HOSPITAL ENCOUNTER (OUTPATIENT)
Dept: CARDIOLOGY | Facility: HOSPITAL | Age: 72
Discharge: HOME | End: 2017-08-09
Attending: INTERNAL MEDICINE

## 2017-08-09 ENCOUNTER — HOSPITAL ENCOUNTER (OUTPATIENT)
Dept: NUCLEAR MEDICINE | Facility: HOSPITAL | Age: 72
Discharge: HOME | End: 2017-08-09
Attending: INTERNAL MEDICINE

## 2017-08-09 DIAGNOSIS — R07.9 CHEST PAIN IN ADULT: ICD-10-CM

## 2017-08-09 PROCEDURE — A9500 TC99M SESTAMIBI: HCPCS | Performed by: INTERNAL MEDICINE

## 2017-08-09 PROCEDURE — 25010000002 REGADENOSON 0.4 MG/5ML SOLUTION: Performed by: INTERNAL MEDICINE

## 2017-08-09 PROCEDURE — 93018 CV STRESS TEST I&R ONLY: CPT | Performed by: INTERNAL MEDICINE

## 2017-08-09 PROCEDURE — 93017 CV STRESS TEST TRACING ONLY: CPT

## 2017-08-09 PROCEDURE — 0 TECHNETIUM SESTAMIBI: Performed by: INTERNAL MEDICINE

## 2017-08-09 PROCEDURE — 78452 HT MUSCLE IMAGE SPECT MULT: CPT

## 2017-08-09 PROCEDURE — 78452 HT MUSCLE IMAGE SPECT MULT: CPT | Performed by: INTERNAL MEDICINE

## 2017-08-09 RX ADMIN — TECHNETIUM TC-99M SESTAMIBI 1 DOSE: 1 INJECTION INTRAVENOUS at 09:22

## 2017-08-09 RX ADMIN — REGADENOSON 0.4 MG: 0.08 INJECTION, SOLUTION INTRAVENOUS at 09:22

## 2017-08-09 RX ADMIN — TECHNETIUM TC-99M SESTAMIBI 1 DOSE: 1 INJECTION INTRAVENOUS at 07:45

## 2017-08-10 LAB
BH CV NUCLEAR PRIOR STUDY: 3
BH CV STRESS BP STAGE 1: NORMAL
BH CV STRESS BP STAGE 2: NORMAL
BH CV STRESS COMMENTS STAGE 1: NORMAL
BH CV STRESS COMMENTS STAGE 2: NORMAL
BH CV STRESS DOSE REGADENOSON STAGE 1: 0.4
BH CV STRESS DURATION MIN STAGE 1: 0
BH CV STRESS DURATION MIN STAGE 2: 4
BH CV STRESS DURATION SEC STAGE 1: 15
BH CV STRESS DURATION SEC STAGE 2: 0
BH CV STRESS HR STAGE 1: 79
BH CV STRESS HR STAGE 2: 66
BH CV STRESS PROTOCOL 1: NORMAL
BH CV STRESS RECOVERY BP: NORMAL MMHG
BH CV STRESS RECOVERY HR: 66 BPM
BH CV STRESS STAGE 1: 1
BH CV STRESS STAGE 2: 2
LV EF NUC BP: 70 %
MAXIMAL PREDICTED HEART RATE: 149 BPM
PERCENT MAX PREDICTED HR: 53.02 %
STRESS BASELINE BP: NORMAL MMHG
STRESS BASELINE HR: 57 BPM
STRESS PERCENT HR: 62 %
STRESS POST PEAK BP: NORMAL MMHG
STRESS POST PEAK HR: 79 BPM
STRESS TARGET HR: 127 BPM

## 2017-08-14 ENCOUNTER — OFFICE VISIT (OUTPATIENT)
Dept: CARDIOLOGY | Facility: CLINIC | Age: 72
End: 2017-08-14

## 2017-08-14 VITALS
HEIGHT: 65 IN | SYSTOLIC BLOOD PRESSURE: 144 MMHG | HEART RATE: 56 BPM | DIASTOLIC BLOOD PRESSURE: 67 MMHG | BODY MASS INDEX: 36.82 KG/M2 | WEIGHT: 221 LBS

## 2017-08-14 DIAGNOSIS — I50.32 CHRONIC DIASTOLIC HEART FAILURE (HCC): Primary | ICD-10-CM

## 2017-08-14 DIAGNOSIS — R77.8 ELEVATED TROPONIN: ICD-10-CM

## 2017-08-14 DIAGNOSIS — R94.39 ABNORMAL NUCLEAR STRESS TEST: ICD-10-CM

## 2017-08-14 PROCEDURE — 99214 OFFICE O/P EST MOD 30 MIN: CPT | Performed by: INTERNAL MEDICINE

## 2017-08-14 RX ORDER — ASPIRIN 81 MG/1
81 TABLET ORAL DAILY
COMMUNITY
End: 2017-08-22 | Stop reason: HOSPADM

## 2017-08-14 RX ORDER — ATORVASTATIN CALCIUM 40 MG/1
40 TABLET, FILM COATED ORAL NIGHTLY
Qty: 30 TABLET | Refills: 3
Start: 2017-08-14 | End: 2017-09-14 | Stop reason: DRUGHIGH

## 2017-08-14 RX ORDER — DIAZEPAM 5 MG/1
5 TABLET ORAL ONCE
Status: CANCELLED | OUTPATIENT
Start: 2017-08-22

## 2017-08-14 RX ORDER — CLOPIDOGREL BISULFATE 75 MG/1
75 TABLET ORAL DAILY
Qty: 30 TABLET | Refills: 5 | Status: SHIPPED | OUTPATIENT
Start: 2017-08-14 | End: 2017-08-22 | Stop reason: HOSPADM

## 2017-08-14 NOTE — PROGRESS NOTES
Priscila Eugene, ERASTO  Mayra Hays  1945 08/14/2017    Patient Active Problem List   Diagnosis   • Pneumonia   • Sepsis due to pneumonia   • Chronic diastolic heart failure   • Elevated troponin   • Chest pain in adult   • Simple chronic bronchitis   • Essential hypertension   • Type 2 diabetes mellitus   • Abnormal nuclear stress test with moderate to large size anteroapical and lateral wall myocardial ischemia.       Dear Priscila Eugene, APRN:    Subjective     Mayra Hays is a 72 y.o. female with the problems as listed above, presents for follow-up of the stress test results.      History of Present Illness:Ms. Hays is a pleasant 72-year-old  female with recent diastolic heart failure and mildly elevated troponin .  She subsequently had a Lexiscan sestamibi study that was significantly abnormal revealing moderate to large size anterolateral apical and lateral wall myocardial ischemia.  She is here to discuss the test results.  She denies any chest pain since the last visit.  She says her breathing is okay.    No Known Allergies:      Current Outpatient Prescriptions:   •  albuterol (PROVENTIL) (2.5 MG/3ML) 0.083% nebulizer solution, Take 2.5 mg by nebulization Every 4 (Four) Hours As Needed for Wheezing., Disp: , Rfl:   •  aspirin 81 MG EC tablet, Take 81 mg by mouth Daily., Disp: , Rfl:   •  atenolol (TENORMIN) 25 MG tablet, Take 25 mg by mouth 2 (Two) Times a Day., Disp: , Rfl:   •  atorvastatin (LIPITOR) 40 MG tablet, Take 1 tablet by mouth Every Night., Disp: 30 tablet, Rfl: 3  •  ferrous sulfate 325 (65 FE) MG tablet, Take 325 mg by mouth 2 (Two) Times a Day., Disp: , Rfl:   •  furosemide (LASIX) 20 MG tablet, Take 20 mg by mouth Daily., Disp: , Rfl:   •  lisinopril (PRINIVIL,ZESTRIL) 5 MG tablet, Take 5 mg by mouth Daily., Disp: , Rfl:   •  magnesium oxide (MAG-OX) 400 MG tablet, Take 400 mg by mouth 2 (Two) Times a Day., Disp: , Rfl:   •  metFORMIN (GLUCOPHAGE) 500 MG tablet,  Take 500 mg by mouth 2 (Two) Times a Day With Meals., Disp: , Rfl:   •  methIMAzole (TAPAZOLE) 5 MG tablet, Take 5 mg by mouth 3 (Three) Times a Day., Disp: , Rfl:   •  nitroglycerin (NITROSTAT) 0.4 MG SL tablet, Place 0.4 mg under the tongue Every 5 (Five) Minutes As Needed for Chest Pain. Take no more than 3 doses in 15 minutes., Disp: , Rfl:   •  pantoprazole (PROTONIX) 40 MG EC tablet, Take 40 mg by mouth Daily., Disp: , Rfl:   •  potassium chloride (KLOR-CON) 20 MEQ packet, Take 40 mEq by mouth As Needed (potassium replacement, see admin instructions)., Disp: , Rfl:   •  clopidogrel (PLAVIX) 75 MG tablet, Take 1 tablet by mouth Daily., Disp: 30 tablet, Rfl: 5  •  magnesium sulfate 2 GM/50ML infusion, Infuse 50 mL into a venous catheter As Needed (Mg less than or equal to 1mg/dL)., Disp: , Rfl:   •  magnesium sulfate 4 GM/100ML infusion, Infuse 100 mL into a venous catheter As Needed (Mg 1.6-1.9 mg/dL)., Disp: , Rfl:   •  metoclopramide (REGLAN) 5 MG/ML injection, Infuse 1 mL into a venous catheter 3 (Three) Times a Day., Disp: , Rfl:       The following portions of the patient's history were reviewed and updated as appropriate: allergies, current medications, past family history, past medical history, past social history, past surgical history and problem list.    Social History   Substance Use Topics   • Smoking status: Former Smoker   • Smokeless tobacco: Not on file   • Alcohol use Not on file       Review of Systems   Constitution: Negative for chills and fever.   HENT: Negative for headaches, nosebleeds and sore throat.    Cardiovascular: Positive for palpitations (feeling of regular heartbeat). Negative for chest pain, dyspnea on exertion and leg swelling.   Respiratory: Negative for cough, hemoptysis, shortness of breath and wheezing.    Gastrointestinal: Negative for abdominal pain, hematemesis, hematochezia, melena, nausea and vomiting.   Genitourinary: Negative for dysuria and hematuria.  "  Neurological: Negative for dizziness and light-headedness.       Objective   Vitals:    08/14/17 1411   BP: 144/67   Pulse: 56   Weight: 221 lb (100 kg)   Height: 65\" (165.1 cm)     Body mass index is 36.78 kg/(m^2).        Physical Exam   Constitutional: She is oriented to person, place, and time. She appears well-developed and well-nourished.   HENT:   Head: Normocephalic.   Eyes: Conjunctivae and EOM are normal.   Neck: Normal range of motion. Neck supple. No JVD present. No tracheal deviation present. No thyromegaly present.   Cardiovascular: Normal rate and regular rhythm.  Exam reveals no gallop and no friction rub.    No murmur heard.  Pulmonary/Chest: Breath sounds normal. No respiratory distress. She has no wheezes. She has no rales.   Abdominal: Soft. Bowel sounds are normal. She exhibits no mass. There is no tenderness.   Musculoskeletal: She exhibits edema (visit him on both legs.).   Neurological: She is alert and oriented to person, place, and time. No cranial nerve deficit.   Skin: Skin is warm and dry.   Psychiatric: She has a normal mood and affect.       Lab Results   Component Value Date     06/25/2017    K 3.9 06/25/2017     06/25/2017    CO2 30.8 06/25/2017    BUN 24 (H) 06/25/2017    CREATININE 0.92 06/25/2017    GLUCOSE 104 06/25/2017    CALCIUM 9.4 06/25/2017    AST 17 06/09/2017    ALT 25 06/09/2017    ALKPHOS 64 06/09/2017    LABIL2 1.0 (L) 06/09/2017     Lab Results   Component Value Date    CKTOTAL 22 (L) 06/02/2017     Lab Results   Component Value Date    WBC 6.73 06/19/2017    HGB 11.7 (L) 06/19/2017    HCT 38.6 06/19/2017     06/19/2017     Lab Results   Component Value Date    INR 1.02 06/01/2017    INR 1.03 05/31/2017    INR 0.98 05/31/2017     Lab Results   Component Value Date    MG 1.7 06/19/2017     Lab Results   Component Value Date    TRIG 290 (H) 06/09/2017    HDL 25 (L) 06/09/2017      Lab Results   Component Value Date    BNP 89.0 06/01/2017 "     · Myocardial perfusion imaging indicates a medium-to-large-sized, moderately degree of schemia located in the anterior wall, apex and lateral wall.  · Normal LV cavity size. Normal LV wall motion noted.  · Left ventricular ejection fraction is normal (Calculated EF = 70%).  · Impressions are consistent with a high risk study.  · Comments: Consider further evaluation with cardiac catheterization.        Echo   No results found for: ECHOEFEST  Procedures    Assessment/Plan      1. Chronic diastolic heart failure     2. Abnormal nuclear stress test with moderate to large size anteroapical and lateral wall myocardial ischemia.     3. Elevated troponin          Recommendations:    1. We will add Plavix 75 mg by mouth daily 2 aspirin 81 mg daily.  2. Continue with atorvastatin and atenolol.  3. I have Discussed results of the nuclear stresses with the patient and her family.  I've discussed with her about the option of further cardiac evaluation with cardiac catheterization.  I discussed the procedure, potential risks, benefits and alternative methods of management.  She expresses understanding of same and is to proceed.  We'll schedule for next Tuesday.  4. Increase atorvastatin 40 mg daily.       Return in about 4 weeks (around 9/11/2017).    As always, I appreciate very much the opportunity to participate in the cardiovascular care of your patients.      With Best Regards,    Jatinder Matias MD, FACC

## 2017-08-17 ENCOUNTER — HOSPITAL ENCOUNTER (OUTPATIENT)
Dept: CT IMAGING | Facility: HOSPITAL | Age: 72
Discharge: HOME | End: 2017-08-17
Attending: INTERNAL MEDICINE | Admitting: INTERNAL MEDICINE

## 2017-08-17 DIAGNOSIS — J90 PLEURAL EFFUSION: ICD-10-CM

## 2017-08-17 DIAGNOSIS — J41.0 SIMPLE CHRONIC BRONCHITIS (HCC): ICD-10-CM

## 2017-08-17 DIAGNOSIS — G47.33 OSA (OBSTRUCTIVE SLEEP APNEA): ICD-10-CM

## 2017-08-17 DIAGNOSIS — I50.32 CHRONIC DIASTOLIC CONGESTIVE HEART FAILURE (HCC): ICD-10-CM

## 2017-08-17 PROCEDURE — 71260 CT THORAX DX C+: CPT | Performed by: RADIOLOGY

## 2017-08-17 PROCEDURE — 0 IOPAMIDOL PER 1 ML: Performed by: INTERNAL MEDICINE

## 2017-08-17 PROCEDURE — 71260 CT THORAX DX C+: CPT

## 2017-08-17 RX ADMIN — IOPAMIDOL 100 ML: 755 INJECTION, SOLUTION INTRAVENOUS at 15:15

## 2017-08-21 ENCOUNTER — LAB (OUTPATIENT)
Dept: LAB | Facility: HOSPITAL | Age: 72
End: 2017-08-21
Attending: INTERNAL MEDICINE

## 2017-08-21 DIAGNOSIS — R94.39 ABNORMAL NUCLEAR STRESS TEST: ICD-10-CM

## 2017-08-21 LAB
ALBUMIN SERPL-MCNC: 4.7 G/DL (ref 3.4–4.8)
ALBUMIN/GLOB SERPL: 1.3 G/DL (ref 1.5–2.5)
ALP SERPL-CCNC: 126 U/L (ref 35–104)
ALT SERPL W P-5'-P-CCNC: 15 U/L (ref 10–36)
ANION GAP SERPL CALCULATED.3IONS-SCNC: 5.9 MMOL/L (ref 3.6–11.2)
AST SERPL-CCNC: 14 U/L (ref 10–30)
BASOPHILS # BLD AUTO: 0.05 10*3/MM3 (ref 0–0.3)
BASOPHILS NFR BLD AUTO: 0.7 % (ref 0–2)
BILIRUB SERPL-MCNC: 0.5 MG/DL (ref 0.2–1.8)
BUN BLD-MCNC: 16 MG/DL (ref 7–21)
BUN/CREAT SERPL: 18.6 (ref 7–25)
CALCIUM SPEC-SCNC: 10.1 MG/DL (ref 7.7–10)
CHLORIDE SERPL-SCNC: 103 MMOL/L (ref 99–112)
CO2 SERPL-SCNC: 32.1 MMOL/L (ref 24.3–31.9)
CREAT BLD-MCNC: 0.86 MG/DL (ref 0.43–1.29)
DEPRECATED RDW RBC AUTO: 42.3 FL (ref 37–54)
EOSINOPHIL # BLD AUTO: 0.56 10*3/MM3 (ref 0–0.7)
EOSINOPHIL NFR BLD AUTO: 7.3 % (ref 0–7)
ERYTHROCYTE [DISTWIDTH] IN BLOOD BY AUTOMATED COUNT: 13.8 % (ref 11.5–14.5)
GFR SERPL CREATININE-BSD FRML MDRD: 65 ML/MIN/1.73
GLOBULIN UR ELPH-MCNC: 3.7 GM/DL
GLUCOSE BLD-MCNC: 116 MG/DL (ref 70–110)
HCT VFR BLD AUTO: 41.3 % (ref 37–47)
HGB BLD-MCNC: 12.6 G/DL (ref 12–16)
IMM GRANULOCYTES # BLD: 0.04 10*3/MM3 (ref 0–0.03)
IMM GRANULOCYTES NFR BLD: 0.5 % (ref 0–0.5)
LYMPHOCYTES # BLD AUTO: 1.64 10*3/MM3 (ref 1–3)
LYMPHOCYTES NFR BLD AUTO: 21.3 % (ref 16–46)
MCH RBC QN AUTO: 26.1 PG (ref 27–33)
MCHC RBC AUTO-ENTMCNC: 30.5 G/DL (ref 33–37)
MCV RBC AUTO: 85.5 FL (ref 80–94)
MONOCYTES # BLD AUTO: 0.71 10*3/MM3 (ref 0.1–0.9)
MONOCYTES NFR BLD AUTO: 9.2 % (ref 0–12)
NEUTROPHILS # BLD AUTO: 4.69 10*3/MM3 (ref 1.4–6.5)
NEUTROPHILS NFR BLD AUTO: 61 % (ref 40–75)
OSMOLALITY SERPL CALC.SUM OF ELEC: 283.4 MOSM/KG (ref 273–305)
PLATELET # BLD AUTO: 239 10*3/MM3 (ref 130–400)
PMV BLD AUTO: 11.1 FL (ref 6–10)
POTASSIUM BLD-SCNC: 4.3 MMOL/L (ref 3.5–5.3)
PROT SERPL-MCNC: 8.4 G/DL (ref 6–8)
RBC # BLD AUTO: 4.83 10*6/MM3 (ref 4.2–5.4)
SODIUM BLD-SCNC: 141 MMOL/L (ref 135–153)
WBC NRBC COR # BLD: 7.69 10*3/MM3 (ref 4.5–12.5)

## 2017-08-21 PROCEDURE — 85025 COMPLETE CBC W/AUTO DIFF WBC: CPT | Performed by: INTERNAL MEDICINE

## 2017-08-21 PROCEDURE — 36415 COLL VENOUS BLD VENIPUNCTURE: CPT

## 2017-08-21 PROCEDURE — 80053 COMPREHEN METABOLIC PANEL: CPT | Performed by: INTERNAL MEDICINE

## 2017-08-22 ENCOUNTER — HOSPITAL ENCOUNTER (OUTPATIENT)
Facility: HOSPITAL | Age: 72
Setting detail: HOSPITAL OUTPATIENT SURGERY
Discharge: HOME | End: 2017-08-22
Attending: INTERNAL MEDICINE | Admitting: INTERNAL MEDICINE

## 2017-08-22 VITALS
BODY MASS INDEX: 35.03 KG/M2 | SYSTOLIC BLOOD PRESSURE: 125 MMHG | HEIGHT: 66 IN | WEIGHT: 218 LBS | OXYGEN SATURATION: 92 % | TEMPERATURE: 97.7 F | HEART RATE: 47 BPM | DIASTOLIC BLOOD PRESSURE: 59 MMHG | RESPIRATION RATE: 20 BRPM

## 2017-08-22 DIAGNOSIS — R94.39 ABNORMAL NUCLEAR STRESS TEST: ICD-10-CM

## 2017-08-22 PROCEDURE — 93005 ELECTROCARDIOGRAM TRACING: CPT | Performed by: INTERNAL MEDICINE

## 2017-08-22 PROCEDURE — 93010 ELECTROCARDIOGRAM REPORT: CPT | Performed by: INTERNAL MEDICINE

## 2017-08-22 PROCEDURE — 93458 L HRT ARTERY/VENTRICLE ANGIO: CPT | Performed by: INTERNAL MEDICINE

## 2017-08-22 PROCEDURE — 25010000002 DIPHENHYDRAMINE PER 50 MG: Performed by: INTERNAL MEDICINE

## 2017-08-22 PROCEDURE — 0 IOPAMIDOL PER 1 ML: Performed by: INTERNAL MEDICINE

## 2017-08-22 PROCEDURE — C1894 INTRO/SHEATH, NON-LASER: HCPCS | Performed by: INTERNAL MEDICINE

## 2017-08-22 PROCEDURE — 25010000002 HEPARIN (PORCINE) PER 1000 UNITS: Performed by: INTERNAL MEDICINE

## 2017-08-22 PROCEDURE — C1769 GUIDE WIRE: HCPCS | Performed by: INTERNAL MEDICINE

## 2017-08-22 PROCEDURE — C1760 CLOSURE DEV, VASC: HCPCS | Performed by: INTERNAL MEDICINE

## 2017-08-22 RX ORDER — SODIUM CHLORIDE 9 MG/ML
INJECTION, SOLUTION INTRAVENOUS CONTINUOUS PRN
Status: DISCONTINUED | OUTPATIENT
Start: 2017-08-22 | End: 2017-08-22 | Stop reason: HOSPADM

## 2017-08-22 RX ORDER — GABAPENTIN 300 MG/1
300 CAPSULE ORAL 2 TIMES DAILY
Status: ON HOLD | COMMUNITY
End: 2018-06-13

## 2017-08-22 RX ORDER — SODIUM CHLORIDE 9 MG/ML
100 INJECTION, SOLUTION INTRAVENOUS CONTINUOUS
Status: DISCONTINUED | OUTPATIENT
Start: 2017-08-22 | End: 2017-08-22 | Stop reason: HOSPADM

## 2017-08-22 RX ORDER — HEPARIN SODIUM 1000 [USP'U]/ML
INJECTION, SOLUTION INTRAVENOUS; SUBCUTANEOUS AS NEEDED
Status: DISCONTINUED | OUTPATIENT
Start: 2017-08-22 | End: 2017-08-22 | Stop reason: HOSPADM

## 2017-08-22 RX ORDER — LIDOCAINE HYDROCHLORIDE 20 MG/ML
INJECTION, SOLUTION INFILTRATION; PERINEURAL AS NEEDED
Status: DISCONTINUED | OUTPATIENT
Start: 2017-08-22 | End: 2017-08-22 | Stop reason: HOSPADM

## 2017-08-22 RX ORDER — DIAZEPAM 5 MG/1
5 TABLET ORAL ONCE
Status: COMPLETED | OUTPATIENT
Start: 2017-08-22 | End: 2017-08-22

## 2017-08-22 RX ORDER — DIPHENHYDRAMINE HYDROCHLORIDE 50 MG/ML
INJECTION INTRAMUSCULAR; INTRAVENOUS AS NEEDED
Status: DISCONTINUED | OUTPATIENT
Start: 2017-08-22 | End: 2017-08-22 | Stop reason: HOSPADM

## 2017-08-22 RX ADMIN — DIAZEPAM 5 MG: 5 TABLET ORAL at 10:24

## 2017-08-25 ENCOUNTER — OFFICE VISIT (OUTPATIENT)
Dept: PULMONOLOGY | Facility: CLINIC | Age: 72
End: 2017-08-25

## 2017-08-25 VITALS
DIASTOLIC BLOOD PRESSURE: 68 MMHG | HEART RATE: 61 BPM | OXYGEN SATURATION: 87 % | HEIGHT: 67 IN | SYSTOLIC BLOOD PRESSURE: 122 MMHG | BODY MASS INDEX: 37.67 KG/M2 | WEIGHT: 240 LBS | TEMPERATURE: 97.6 F

## 2017-08-25 DIAGNOSIS — J20.9 ACUTE BRONCHITIS, UNSPECIFIED ORGANISM: ICD-10-CM

## 2017-08-25 DIAGNOSIS — G47.33 OSA (OBSTRUCTIVE SLEEP APNEA): Primary | ICD-10-CM

## 2017-08-25 DIAGNOSIS — Z79.899 DRUG THERAPY: Primary | ICD-10-CM

## 2017-08-25 DIAGNOSIS — J44.1 CHRONIC OBSTRUCTIVE PULMONARY DISEASE WITH ACUTE EXACERBATION (HCC): ICD-10-CM

## 2017-08-25 PROCEDURE — 99214 OFFICE O/P EST MOD 30 MIN: CPT | Performed by: INTERNAL MEDICINE

## 2017-08-25 RX ORDER — AZITHROMYCIN 250 MG/1
TABLET, FILM COATED ORAL
Qty: 6 TABLET | Refills: 0 | Status: SHIPPED | OUTPATIENT
Start: 2017-08-25 | End: 2017-09-14

## 2017-08-25 RX ORDER — PREDNISONE 10 MG/1
5 TABLET ORAL DAILY
Qty: 5 TABLET | Refills: 0 | Status: ON HOLD | OUTPATIENT
Start: 2017-08-25 | End: 2018-06-13

## 2017-08-25 NOTE — PROGRESS NOTES
Subjective   Mayra Hays is a 72 y.o. female who is being seen for Bronchitis    History of Present Illness   Patient returns after CT scan of the chest and nocturnal polysomnography.  Intermittently she still continues to have significant shortness of breath and wheezing.  She tells me that for the past 7 days her wheezing and shortness of breath has increased significantly along with cough productive of yellowish phlegm.  No fever or chill.  Sleep disturbance is still persisting as before.  Past Medical History:   Diagnosis Date   • Collapsed lung    • COPD (chronic obstructive pulmonary disease)    • GERD (gastroesophageal reflux disease)    • Hyperlipidemia    • Hypertension    • Myocardial infarction      Past Surgical History:   Procedure Laterality Date   • CARDIAC CATHETERIZATION N/A 8/22/2017    Procedure: Left Heart Cath;  Surgeon: Jatinder Matias MD;  Location: [facility] CATH INVASIVE LOCATION;  Service:      History reviewed. No pertinent family history.   reports that she has quit smoking. She does not have any smokeless tobacco history on file.  No Known Allergies        The following portions of the patient's history were reviewed and updated as appropriate: allergies, current medications, past family history, past medical history, past social history, past surgical history and problem list.    Review of Systems   Constitutional: Negative for appetite change, chills, diaphoresis and unexpected weight change.   HENT: Negative for sore throat, trouble swallowing and voice change.    Eyes: Negative for visual disturbance.   Respiratory: Positive for shortness of breath and wheezing. Negative for apnea, cough and choking.    Cardiovascular: Negative for chest pain, palpitations and leg swelling.   Gastrointestinal: Negative for abdominal pain, constipation, diarrhea, nausea and vomiting.   Endocrine: Negative for cold intolerance, heat intolerance, polydipsia, polyphagia and polyuria.   Genitourinary:  "Negative for difficulty urinating and dysuria.   Musculoskeletal: Negative for gait problem.   Skin: Negative for rash and wound.   Neurological: Negative for syncope and light-headedness.   Hematological: Negative for adenopathy.   Psychiatric/Behavioral: Negative for agitation, behavioral problems and confusion.   All other systems reviewed and are negative.      Objective   /68 (BP Location: Left arm, Patient Position: Sitting, Cuff Size: Adult)  Pulse 61  Temp 97.6 °F (36.4 °C) (Oral)   Ht 67\" (170.2 cm)  Wt 240 lb (109 kg)  SpO2 (!) 87%  BMI 37.59 kg/m2  Physical Exam   Constitutional: She is oriented to person, place, and time. She appears well-developed and well-nourished.   HENT:   Head: Normocephalic and atraumatic.   Nose: Mucosal edema present.   Eyes: EOM are normal. Pupils are equal, round, and reactive to light.   Neck: Neck supple.   Cardiovascular: Normal rate, regular rhythm and normal heart sounds.    Pulmonary/Chest: She has wheezes.   Expiratory wheezing bilaterally, prominent posteriorly   Abdominal: Soft. Bowel sounds are normal.   Musculoskeletal: Normal range of motion.   Neurological: She is alert and oriented to person, place, and time.   Skin: Skin is warm and dry.   Psychiatric: She has a normal mood and affect. Her behavior is normal.   Nursing note and vitals reviewed.        Radiology:  Ct Chest With Contrast    Result Date: 8/17/2017   1. Chronic appearing interstitial lung changes with mild basilar fibrosis. 2. No significant atelectasis identified and no effusions are noted. 3. 5 mm pulmonary nodule right lower lobe for which 12 month follow-up CT recommended (Lung-RADS category 2). 4. Cardiomegaly and probable reactive prominent but unenlarged mediastinal lymph nodes.  This report was finalized on 8/17/2017 2:59 PM by Dr. Kunal Castanon MD.      Xr Chest 1 View    Result Date: 6/21/2017  Stable radiographic appearance of the chest.  This report was finalized on " 6/21/2017 8:26 AM by Dr. Bill Parham MD.      Xr Chest 1 View    Result Date: 6/19/2017  Radiographic changes of congestive failure.  This report was finalized on 6/19/2017 1:36 PM by Dr. Bill Parham MD.      Xr Chest 1 View    Result Date: 6/15/2017  No significant change in the cardiopulmonary status after removal of the endotracheal tube. Parenchymal infiltrate persists in the left lung base.  This report was finalized on 6/15/2017 9:13 AM by Dr. Jose Manuel Ruiz II, MD.      Xr Chest 1 View    Result Date: 6/12/2017  Overall, interval improvement.     This report was finalized on 6/12/2017 10:09 AM by Dr. Mauricio Santiago MD.      Xr Chest 1 View    Result Date: 6/8/2017  Trace bibasilar effusions and bibasilar consolidation     This report was finalized on 6/8/2017 4:11 PM by Dr. Mauricio Santiago MD.      Xr Chest 1 View    Result Date: 6/5/2017  NG tube in stomach.     This report was finalized on 6/5/2017 8:42 AM by Dr. Mauricio Santiago MD.      Xr Chest 1 View    Result Date: 6/1/2017  Worsened bilateral airspace disease.  This report was finalized on 6/1/2017 6:17 AM by Dr. Bill Parham MD.      Xr Chest 1 View    Result Date: 5/31/2017  ET TUBE BELOW THE LEVEL OF CLAVICLES. NG TUBE IN THE STOMACH. RIGHT LOWER LOBE PNEUMONIA.  This report was finalized on 5/31/2017 6:54 AM by Dr. Bill Parham MD.      Xr Chest Ap    Result Date: 6/7/2017  Slight interval improvement.     This report was finalized on 6/7/2017 9:45 AM by Dr. Mauricio Santiago MD.      Xr Chest Ap    Result Date: 6/6/2017  Bibasilar effusions and right basilar consolidation.     This report was finalized on 6/6/2017 9:35 AM by Dr. Mauricio Santiago MD.      Xr Chest Ap    Result Date: 6/5/2017  Mild congestive heart failure and bilateral consolidation.     This report was finalized on 6/5/2017 8:15 AM by Dr. Mauricio Santiago MD.      Xr Chest Ap    Result Date: 6/4/2017  As above.  This report was finalized on 6/4/2017 9:03 AM by Dr. Bill Parham MD.      Xr  Chest Ap    Result Date: 6/3/2017  Right PICC line now with tip in SVC.  This report was finalized on 6/3/2017 8:47 AM by Dr. Bill Parham MD.      Fl Video Swallow    Result Date: 6/21/2017  Laryngeal penetration of thin liquids during the swallow w/ cup and straw, clearing upon completion of the swallow w/o significant residue. Compensatory chin tuck is effective to alleviate penetration. No other laryngeal penetration across this evaluation. No aspiration.  This report was finalized on 6/21/2017 9:50 AM by Dr. Bill Parham MD.        Lab Results:  Lab on 08/21/2017   Component Date Value Ref Range Status   • Glucose 08/21/2017 116* 70 - 110 mg/dL Final   • BUN 08/21/2017 16  7 - 21 mg/dL Final   • Creatinine 08/21/2017 0.86  0.43 - 1.29 mg/dL Final   • Sodium 08/21/2017 141  135 - 153 mmol/L Final   • Potassium 08/21/2017 4.3  3.5 - 5.3 mmol/L Final   • Chloride 08/21/2017 103  99 - 112 mmol/L Final   • CO2 08/21/2017 32.1* 24.3 - 31.9 mmol/L Final   • Calcium 08/21/2017 10.1* 7.7 - 10.0 mg/dL Final   • Total Protein 08/21/2017 8.4* 6.0 - 8.0 g/dL Final   • Albumin 08/21/2017 4.70  3.40 - 4.80 g/dL Final   • ALT (SGPT) 08/21/2017 15  10 - 36 U/L Final   • AST (SGOT) 08/21/2017 14  10 - 30 U/L Final   • Alkaline Phosphatase 08/21/2017 126* 35 - 104 U/L Final   • Total Bilirubin 08/21/2017 0.5  0.2 - 1.8 mg/dL Final   • eGFR Non  Amer 08/21/2017 65  >60 mL/min/1.73 Final   • Globulin 08/21/2017 3.7  gm/dL Final   • A/G Ratio 08/21/2017 1.3* 1.5 - 2.5 g/dL Final   • BUN/Creatinine Ratio 08/21/2017 18.6  7.0 - 25.0 Final   • Anion Gap 08/21/2017 5.9  3.6 - 11.2 mmol/L Final   • WBC 08/21/2017 7.69  4.50 - 12.50 10*3/mm3 Final   • RBC 08/21/2017 4.83  4.20 - 5.40 10*6/mm3 Final   • Hemoglobin 08/21/2017 12.6  12.0 - 16.0 g/dL Final   • Hematocrit 08/21/2017 41.3  37.0 - 47.0 % Final   • MCV 08/21/2017 85.5  80.0 - 94.0 fL Final   • MCH 08/21/2017 26.1* 27.0 - 33.0 pg Final   • MCHC 08/21/2017 30.5* 33.0 -  37.0 g/dL Final   • RDW 08/21/2017 13.8  11.5 - 14.5 % Final   • RDW-SD 08/21/2017 42.3  37.0 - 54.0 fl Final   • MPV 08/21/2017 11.1* 6.0 - 10.0 fL Final   • Platelets 08/21/2017 239  130 - 400 10*3/mm3 Final   • Neutrophil % 08/21/2017 61.0  40.0 - 75.0 % Final   • Lymphocyte % 08/21/2017 21.3  16.0 - 46.0 % Final   • Monocyte % 08/21/2017 9.2  0.0 - 12.0 % Final   • Eosinophil % 08/21/2017 7.3* 0.0 - 7.0 % Final   • Basophil % 08/21/2017 0.7  0.0 - 2.0 % Final   • Immature Grans % 08/21/2017 0.5  0.0 - 0.5 % Final   • Neutrophils, Absolute 08/21/2017 4.69  1.40 - 6.50 10*3/mm3 Final   • Lymphocytes, Absolute 08/21/2017 1.64  1.00 - 3.00 10*3/mm3 Final   • Monocytes, Absolute 08/21/2017 0.71  0.10 - 0.90 10*3/mm3 Final   • Eosinophils, Absolute 08/21/2017 0.56  0.00 - 0.70 10*3/mm3 Final   • Basophils, Absolute 08/21/2017 0.05  0.00 - 0.30 10*3/mm3 Final   • Immature Grans, Absolute 08/21/2017 0.04* 0.00 - 0.03 10*3/mm3 Final   • Osmolality Calc 08/21/2017 283.4  273.0 - 305.0 mOsm/kg Final       Assessment      ICD-10-CM ICD-9-CM   1. OSWALDO (obstructive sleep apnea) G47.33 327.23   2. Acute bronchitis, unspecified organism J20.9 466.0   3. Chronic obstructive pulmonary disease with acute exacerbation J44.1 491.21                DISCUSSION:  I have reviewed the nocturnal polysomnography.  Patient had findings consistent with obstructive sleep apnea with respiratory disturbance index of 31 and lowest O2 sat of 69%.  Patient is very was symptomatic with this and would benefit from pressure therapy.  We are putting her on AutoPap with a pressure setting of 5-15 cm water.    Patient has increased shortness of breath and wheezing for the past 7 days or so along with purulent sputum production.  I suspect she has acute bronchitis with COPD exacerbation.  I'm putting her on a course of Z-Sanjeev and prednisone 5 minium a day for 5 days.  She should continue all of her other inhalers as she is using.    Plan    Orders Placed  This Encounter   Procedures   • PAP Therapy     New Medications Ordered This Visit   Medications   • azithromycin (ZITHROMAX Z-NITISH) 250 MG tablet     Sig: Take 2 tablets the first day, then 1 tablet daily for 4 days.     Dispense:  6 tablet     Refill:  0   • predniSONE (DELTASONE) 10 MG tablet     Sig: Take 0.5 tablets by mouth Daily.     Dispense:  5 tablet     Refill:  0                  Hemalatha Brewer MD, FCCP, FAASM  Pulmonary, Critical Care, and Sleep Medicine

## 2017-08-28 LAB — CREAT BLDA-MCNC: 0.9 MG/DL (ref 0.6–1.3)

## 2017-09-07 ENCOUNTER — TRANSCRIBE ORDERS (OUTPATIENT)
Dept: ADMINISTRATIVE | Facility: HOSPITAL | Age: 72
End: 2017-09-07

## 2017-09-07 DIAGNOSIS — I25.2 OLD MYOCARDIAL INFARCTION: ICD-10-CM

## 2017-09-07 DIAGNOSIS — E05.90 HYPERTHYROIDISM, SUBCLINICAL: Primary | ICD-10-CM

## 2017-09-07 DIAGNOSIS — I50.20 SYSTOLIC CONGESTIVE HEART FAILURE, UNSPECIFIED CONGESTIVE HEART FAILURE CHRONICITY: ICD-10-CM

## 2017-09-14 ENCOUNTER — OFFICE VISIT (OUTPATIENT)
Dept: CARDIOLOGY | Facility: CLINIC | Age: 72
End: 2017-09-14

## 2017-09-14 VITALS
BODY MASS INDEX: 34.84 KG/M2 | HEIGHT: 67 IN | DIASTOLIC BLOOD PRESSURE: 63 MMHG | SYSTOLIC BLOOD PRESSURE: 105 MMHG | HEART RATE: 56 BPM | WEIGHT: 222 LBS

## 2017-09-14 DIAGNOSIS — E78.5 DYSLIPIDEMIA: ICD-10-CM

## 2017-09-14 DIAGNOSIS — I10 ESSENTIAL HYPERTENSION: ICD-10-CM

## 2017-09-14 DIAGNOSIS — R07.2 PRECORDIAL PAIN: Primary | ICD-10-CM

## 2017-09-14 PROCEDURE — 99213 OFFICE O/P EST LOW 20 MIN: CPT | Performed by: PHYSICIAN ASSISTANT

## 2017-09-14 RX ORDER — POTASSIUM CHLORIDE 20 MEQ/1
20 TABLET, EXTENDED RELEASE ORAL 2 TIMES DAILY
Status: ON HOLD | COMMUNITY
End: 2018-06-13

## 2017-09-14 RX ORDER — PANTOPRAZOLE SODIUM 40 MG/1
40 TABLET, DELAYED RELEASE ORAL DAILY
COMMUNITY
End: 2018-06-16 | Stop reason: HOSPADM

## 2017-09-14 RX ORDER — ATORVASTATIN CALCIUM 20 MG/1
20 TABLET, FILM COATED ORAL DAILY
Status: ON HOLD | COMMUNITY
End: 2019-01-25

## 2017-09-14 RX ORDER — BUMETANIDE 1 MG/1
1 TABLET ORAL DAILY
COMMUNITY
End: 2017-09-14

## 2017-09-14 RX ORDER — CLOPIDOGREL BISULFATE 75 MG/1
75 TABLET ORAL DAILY
COMMUNITY
End: 2017-09-14

## 2017-09-14 RX ORDER — ATENOLOL 25 MG/1
25 TABLET ORAL DAILY
Qty: 30 TABLET | Refills: 4 | Status: SHIPPED | OUTPATIENT
Start: 2017-09-14 | End: 2018-06-27 | Stop reason: HOSPADM

## 2017-09-14 NOTE — PROGRESS NOTES
Priscila Eugene, ERASTO  Mayra Hays  1945 09/14/2017    Patient Active Problem List   Diagnosis   • Pneumonia   • Sepsis due to pneumonia   • Chronic diastolic heart failure   • Elevated troponin   • Chest pain in adult   • Simple chronic bronchitis   • Essential hypertension   • Type 2 diabetes mellitus   • Abnormal nuclear stress test with moderate to large size anteroapical and lateral wall myocardial ischemia.     Dear Priscila Eugene, APRN:    Chief Complaint   Patient presents with   • Post Cardiac Cath     follow up     Subjective     Mayra Hays is a 72 y.o. female with a past medical history significant for hypertension, dyslipidemia, and type 2 diabetes mellitus.  She was recently evaluated with a left heart catheterization which revealed normal coronary angiograms.  She presents back to the office today for follow-up visit.  She denies any further chest pains.  Her blood pressure and heart rate are running a little on the low side today.  She denies any dizziness or near syncopal episodes.  Overall she states she is feeling better.      Current Outpatient Prescriptions:   •  albuterol (PROVENTIL) (2.5 MG/3ML) 0.083% nebulizer solution, Take 2.5 mg by nebulization Every 4 (Four) Hours As Needed for Wheezing., Disp: , Rfl:   •  atenolol (TENORMIN) 25 MG tablet, Take 25 mg by mouth 2 (Two) Times a Day., Disp: , Rfl:   •  atorvastatin (LIPITOR) 20 MG tablet, Take 20 mg by mouth Daily., Disp: , Rfl:   •  esomeprazole (nexIUM) 20 MG capsule, Take 20 mg by mouth Every Morning Before Breakfast., Disp: , Rfl:   •  ferrous sulfate 325 (65 FE) MG tablet, Take 325 mg by mouth 2 (Two) Times a Day., Disp: , Rfl:   •  furosemide (LASIX) 20 MG tablet, Take 20 mg by mouth Daily., Disp: , Rfl:   •  gabapentin (NEURONTIN) 300 MG capsule, Take 300 mg by mouth 2 (Two) Times a Day., Disp: , Rfl:   •  lisinopril (PRINIVIL,ZESTRIL) 5 MG tablet, Take 5 mg by mouth Daily., Disp: , Rfl:   •  magnesium oxide  "(MAG-OX) 400 MG tablet, Take 400 mg by mouth 2 (Two) Times a Day., Disp: , Rfl:   •  metFORMIN (GLUCOPHAGE) 500 MG tablet, Take 500 mg by mouth 2 (Two) Times a Day With Meals., Disp: , Rfl:   •  methIMAzole (TAPAZOLE) 5 MG tablet, Take 5 mg by mouth 3 (Three) Times a Day., Disp: , Rfl:   •  pantoprazole (PROTONIX) 40 MG EC tablet, Take 40 mg by mouth Daily., Disp: , Rfl:   •  potassium chloride (K-DUR,KLOR-CON) 20 MEQ CR tablet, Take 20 mEq by mouth 2 (Two) Times a Day., Disp: , Rfl:   •  predniSONE (DELTASONE) 10 MG tablet, Take 0.5 tablets by mouth Daily., Disp: 5 tablet, Rfl: 0    The following portions of the patient's history were reviewed and updated as appropriate: allergies, current medications, past family history, past medical history, past social history, past surgical history and problem list.    Social History     Social History   • Marital status: Other     Spouse name: N/A   • Number of children: N/A   • Years of education: N/A     Occupational History   • Not on file.     Social History Main Topics   • Smoking status: Former Smoker   • Smokeless tobacco: Not on file   • Alcohol use Not on file   • Drug use: Not on file   • Sexual activity: Not on file     Other Topics Concern   • Not on file     Social History Narrative     Review of Systems   Cardiovascular: Positive for dyspnea on exertion (with walking; occasional). Negative for chest pain, leg swelling, near-syncope, palpitations and syncope.   Hematologic/Lymphatic: Negative for bleeding problem (1 episode where she awoke with sm amt of blood from her vagina, no episodes before then or after.  ).   Neurological: Negative for dizziness and light-headedness.     Objective   Blood pressure 105/63, pulse 56, height 67\" (170.2 cm), weight 222 lb (101 kg).    Physical Exam   Constitutional: She is oriented to person, place, and time. She appears well-developed and well-nourished. No distress.   HENT:   Head: Normocephalic and atraumatic.   Eyes: " Conjunctivae are normal. Right eye exhibits no discharge. Left eye exhibits no discharge.   Neck: Normal range of motion. Neck supple. Carotid bruit is not present.   Cardiovascular: Normal rate, regular rhythm and normal heart sounds.  Exam reveals no gallop and no friction rub.    No murmur heard.  Pulmonary/Chest: Effort normal and breath sounds normal. No respiratory distress. She has no wheezes. She has no rales. She exhibits no tenderness.   Musculoskeletal: Normal range of motion. She exhibits no edema.   Neurological: She is alert and oriented to person, place, and time.   Skin: Skin is warm and dry. No rash noted. She is not diaphoretic. No erythema. No pallor.   Psychiatric: She has a normal mood and affect. Her behavior is normal.   Nursing note and vitals reviewed.    Procedures     Transthoracic Echocardiogram 05/31/17  · The study is technically difficult for diagnosis.Poor echo windows. On ventilator  · Left ventricular systolic function is normal. Estimated EF=55-60%  · All left ventricular wall segments contract normally  · Left ventricular diastolic dysfunction (grade II) consistent with pseudonormalization.  · The aortic valve is structurally normal. No aortic valve regurgitation is present. No aortic valve stenosis is present  · Trace-to-mild mitral valve regurgitation is present  · Mild tricuspid valve regurgitation is present.  · Mild pulmonary hypertension is present.  · The pulmonic valve was not well visualized  · There is no evidence of pericardial effusion    Left heart catheterization 08/22/17  Coronary angiograms:     Left main coronary artery is normal and bifurcates into a medium-sized left and descending and left circumflex systems.     Left anterior descending coronary artery is angiographically normal including the diagonal branches.     Left circumflex coronary artery is angiographically normal including the obtuse marginal branches     Right coronary artery is dominant for  posterior circulation and is normal as well.     Recommendations: In view of absence of any coronary artery disease, we'll continue to emphasize on risk factor modification as needed for now and discontinue the aspirin and Plavix.     Jatinder Matias M.D. [facility]  Assessment:          Diagnosis Plan   1. Precordial pain      With recent normal coronary angiograms.  Resolved.   2. Dyslipidemia     3. Essential hypertension          Plan:       1. Decrease atenolol to 25 mg once a day since her blood pressure and heart rate are running low.  2. Continue aspirin and Plavix since her coronary angiograms were normal.  3. We will follow-up in 4-5 months or sooner if needed.    No Follow-up on file.    I appreciate the opportunity to participate in this patient's cardiovascular care.    Best Regards,    Leni Prabhakar PA-C

## 2017-09-19 ENCOUNTER — HOSPITAL ENCOUNTER (OUTPATIENT)
Dept: ULTRASOUND IMAGING | Facility: HOSPITAL | Age: 72
Discharge: HOME | End: 2017-09-19

## 2017-09-19 ENCOUNTER — HOSPITAL ENCOUNTER (OUTPATIENT)
Dept: CARDIOLOGY | Facility: HOSPITAL | Age: 72
Discharge: HOME | End: 2017-09-19
Admitting: NURSE PRACTITIONER

## 2017-09-19 DIAGNOSIS — I25.2 OLD MYOCARDIAL INFARCTION: ICD-10-CM

## 2017-09-19 DIAGNOSIS — E05.90 HYPERTHYROIDISM, SUBCLINICAL: ICD-10-CM

## 2017-09-19 DIAGNOSIS — I50.20 SYSTOLIC CONGESTIVE HEART FAILURE, UNSPECIFIED CONGESTIVE HEART FAILURE CHRONICITY: ICD-10-CM

## 2017-09-19 PROCEDURE — 93306 TTE W/DOPPLER COMPLETE: CPT

## 2017-09-19 PROCEDURE — 76536 US EXAM OF HEAD AND NECK: CPT | Performed by: RADIOLOGY

## 2017-09-19 PROCEDURE — 93306 TTE W/DOPPLER COMPLETE: CPT | Performed by: INTERNAL MEDICINE

## 2017-09-19 PROCEDURE — 76536 US EXAM OF HEAD AND NECK: CPT

## 2017-09-20 LAB
BH CV ECHO MEAS - % IVS THICK: 27.8 %
BH CV ECHO MEAS - % LVPW THICK: 83.3 %
BH CV ECHO MEAS - ACS: 2 CM
BH CV ECHO MEAS - AO ROOT AREA (BSA CORRECTED): 1.4
BH CV ECHO MEAS - AO ROOT AREA: 7.3 CM^2
BH CV ECHO MEAS - AO ROOT DIAM: 3.1 CM
BH CV ECHO MEAS - BSA(HAYCOCK): 2.2 M^2
BH CV ECHO MEAS - BSA: 2.1 M^2
BH CV ECHO MEAS - BZI_BMI: 34.8 KILOGRAMS/M^2
BH CV ECHO MEAS - BZI_METRIC_HEIGHT: 170.2 CM
BH CV ECHO MEAS - BZI_METRIC_WEIGHT: 100.7 KG
BH CV ECHO MEAS - CONTRAST EF 4CH: 55.7 ML/M^2
BH CV ECHO MEAS - EDV(CUBED): 168.4 ML
BH CV ECHO MEAS - EDV(MOD-SP4): 88 ML
BH CV ECHO MEAS - EDV(TEICH): 148.8 ML
BH CV ECHO MEAS - EF(CUBED): 69.2 %
BH CV ECHO MEAS - EF(TEICH): 60.2 %
BH CV ECHO MEAS - ESV(CUBED): 51.9 ML
BH CV ECHO MEAS - ESV(MOD-SP4): 39 ML
BH CV ECHO MEAS - ESV(TEICH): 59.3 ML
BH CV ECHO MEAS - FS: 32.5 %
BH CV ECHO MEAS - IVS/LVPW: 1
BH CV ECHO MEAS - IVSD: 0.87 CM
BH CV ECHO MEAS - IVSS: 1.1 CM
BH CV ECHO MEAS - LA DIMENSION: 4.2 CM
BH CV ECHO MEAS - LA/AO: 1.4
BH CV ECHO MEAS - LV DIASTOLIC VOL/BSA (35-75): 41.6 ML/M^2
BH CV ECHO MEAS - LV MASS(C)D: 179.7 GRAMS
BH CV ECHO MEAS - LV MASS(C)DI: 85 GRAMS/M^2
BH CV ECHO MEAS - LV MASS(C)S: 180 GRAMS
BH CV ECHO MEAS - LV MASS(C)SI: 85.1 GRAMS/M^2
BH CV ECHO MEAS - LV SYSTOLIC VOL/BSA (12-30): 18.5 ML/M^2
BH CV ECHO MEAS - LVIDD: 5.5 CM
BH CV ECHO MEAS - LVIDS: 3.7 CM
BH CV ECHO MEAS - LVLD AP4: 7.9 CM
BH CV ECHO MEAS - LVLS AP4: 6.5 CM
BH CV ECHO MEAS - LVOT AREA (M): 2.8 CM^2
BH CV ECHO MEAS - LVOT AREA: 2.7 CM^2
BH CV ECHO MEAS - LVOT DIAM: 1.9 CM
BH CV ECHO MEAS - LVPWD: 0.87 CM
BH CV ECHO MEAS - LVPWS: 1.6 CM
BH CV ECHO MEAS - MV A MAX VEL: 67.6 CM/SEC
BH CV ECHO MEAS - MV E MAX VEL: 77 CM/SEC
BH CV ECHO MEAS - MV E/A: 1.1
BH CV ECHO MEAS - PA ACC SLOPE: 1414 CM/SEC^2
BH CV ECHO MEAS - PA ACC TIME: 0.1 SEC
BH CV ECHO MEAS - PA PR(ACCEL): 36.2 MMHG
BH CV ECHO MEAS - RAP SYSTOLE: 10 MMHG
BH CV ECHO MEAS - RVDD: 3 CM
BH CV ECHO MEAS - RVSP: 43.1 MMHG
BH CV ECHO MEAS - SI(CUBED): 55.1 ML/M^2
BH CV ECHO MEAS - SI(MOD-SP4): 23.2 ML/M^2
BH CV ECHO MEAS - SI(TEICH): 42.4 ML/M^2
BH CV ECHO MEAS - SV(CUBED): 116.5 ML
BH CV ECHO MEAS - SV(MOD-SP4): 49 ML
BH CV ECHO MEAS - SV(TEICH): 89.6 ML
BH CV ECHO MEAS - TR MAX VEL: 287.8 CM/SEC

## 2017-09-24 ENCOUNTER — HOSPITAL ENCOUNTER (OUTPATIENT)
Dept: RESPIRATORY THERAPY | Facility: HOSPITAL | Age: 72
Discharge: HOME | End: 2017-09-24
Attending: INTERNAL MEDICINE | Admitting: INTERNAL MEDICINE

## 2017-09-24 ENCOUNTER — LAB (OUTPATIENT)
Dept: LAB | Facility: HOSPITAL | Age: 72
End: 2017-09-24

## 2017-09-24 DIAGNOSIS — Z79.899 DRUG THERAPY: ICD-10-CM

## 2017-09-24 DIAGNOSIS — I50.32 CHRONIC DIASTOLIC CONGESTIVE HEART FAILURE (HCC): ICD-10-CM

## 2017-09-24 DIAGNOSIS — J41.0 SIMPLE CHRONIC BRONCHITIS (HCC): ICD-10-CM

## 2017-09-24 DIAGNOSIS — J90 PLEURAL EFFUSION: ICD-10-CM

## 2017-09-24 DIAGNOSIS — G47.33 OSA (OBSTRUCTIVE SLEEP APNEA): ICD-10-CM

## 2017-09-24 LAB
ALBUMIN SERPL-MCNC: 4.4 G/DL (ref 3.4–4.8)
ALBUMIN/GLOB SERPL: 1.3 G/DL (ref 1.5–2.5)
ALP SERPL-CCNC: 128 U/L (ref 35–104)
ALT SERPL W P-5'-P-CCNC: 27 U/L (ref 10–36)
ANION GAP SERPL CALCULATED.3IONS-SCNC: 6.9 MMOL/L (ref 3.6–11.2)
AST SERPL-CCNC: 22 U/L (ref 10–30)
BILIRUB SERPL-MCNC: 0.2 MG/DL (ref 0.2–1.8)
BUN BLD-MCNC: 19 MG/DL (ref 7–21)
BUN/CREAT SERPL: 19.8 (ref 7–25)
CALCIUM SPEC-SCNC: 9.9 MG/DL (ref 7.7–10)
CHLORIDE SERPL-SCNC: 104 MMOL/L (ref 99–112)
CO2 SERPL-SCNC: 27.1 MMOL/L (ref 24.3–31.9)
CREAT BLD-MCNC: 0.96 MG/DL (ref 0.43–1.29)
GFR SERPL CREATININE-BSD FRML MDRD: 57 ML/MIN/1.73
GLOBULIN UR ELPH-MCNC: 3.5 GM/DL
GLUCOSE BLD-MCNC: 121 MG/DL (ref 70–110)
OSMOLALITY SERPL CALC.SUM OF ELEC: 279.2 MOSM/KG (ref 273–305)
POTASSIUM BLD-SCNC: 4.5 MMOL/L (ref 3.5–5.3)
PROT SERPL-MCNC: 7.9 G/DL (ref 6–8)
SODIUM BLD-SCNC: 138 MMOL/L (ref 135–153)

## 2017-09-24 PROCEDURE — 94060 EVALUATION OF WHEEZING: CPT | Performed by: INTERNAL MEDICINE

## 2017-09-24 PROCEDURE — 94729 DIFFUSING CAPACITY: CPT

## 2017-09-24 PROCEDURE — 94727 GAS DIL/WSHOT DETER LNG VOL: CPT | Performed by: INTERNAL MEDICINE

## 2017-09-24 PROCEDURE — 36415 COLL VENOUS BLD VENIPUNCTURE: CPT

## 2017-09-24 PROCEDURE — 94060 EVALUATION OF WHEEZING: CPT

## 2017-09-24 PROCEDURE — 80053 COMPREHEN METABOLIC PANEL: CPT | Performed by: PHYSICIAN ASSISTANT

## 2017-09-24 PROCEDURE — 94727 GAS DIL/WSHOT DETER LNG VOL: CPT

## 2017-10-11 ENCOUNTER — OFFICE VISIT (OUTPATIENT)
Dept: PULMONOLOGY | Facility: CLINIC | Age: 72
End: 2017-10-11

## 2017-10-11 VITALS
DIASTOLIC BLOOD PRESSURE: 70 MMHG | WEIGHT: 222 LBS | SYSTOLIC BLOOD PRESSURE: 138 MMHG | BODY MASS INDEX: 35.68 KG/M2 | HEART RATE: 58 BPM | OXYGEN SATURATION: 90 % | HEIGHT: 66 IN | TEMPERATURE: 97.6 F

## 2017-10-11 DIAGNOSIS — J41.0 SIMPLE CHRONIC BRONCHITIS (HCC): Primary | ICD-10-CM

## 2017-10-11 DIAGNOSIS — G47.33 OSA (OBSTRUCTIVE SLEEP APNEA): ICD-10-CM

## 2017-10-11 DIAGNOSIS — J84.9 INTERSTITIAL LUNG DISEASE (HCC): ICD-10-CM

## 2017-10-11 DIAGNOSIS — R91.1 LUNG NODULE: ICD-10-CM

## 2017-10-11 PROCEDURE — 99214 OFFICE O/P EST MOD 30 MIN: CPT | Performed by: INTERNAL MEDICINE

## 2017-10-11 RX ORDER — IPRATROPIUM BROMIDE AND ALBUTEROL SULFATE 2.5; .5 MG/3ML; MG/3ML
3 SOLUTION RESPIRATORY (INHALATION) EVERY 4 HOURS PRN
Qty: 120 VIAL | Refills: 11 | Status: ON HOLD | OUTPATIENT
Start: 2017-10-11 | End: 2018-06-13

## 2017-10-11 NOTE — PROGRESS NOTES
Subjective   Mayra Hays is a 72 y.o. female who is being seen for Sleep Apnea and Bronchitis    History of Present Illness   Patient returns for follow-up for sleep apnea.  During her last visit we prescribed her an AutoPap.  Patient tells me that is to try to cope up with her mask but admits that when she uses it for at least 4-6 hours she feels much better in the daytime.  Other than that her shortness of breath is at her baseline.  She is using her inhalers properly  Past Medical History:   Diagnosis Date   • Collapsed lung    • COPD (chronic obstructive pulmonary disease)    • GERD (gastroesophageal reflux disease)    • Hyperlipidemia    • Hypertension    • Myocardial infarction      Past Surgical History:   Procedure Laterality Date   • CARDIAC CATHETERIZATION N/A 8/22/2017    Procedure: Left Heart Cath;  Surgeon: Jtainder Matias MD;  Location: [facility] CATH INVASIVE LOC[facility];  Service:      History reviewed. No pertinent family history.   reports that she has quit smoking. She does not have any smokeless tobacco history on file.  No Known Allergies      The following portions of the patient's history were reviewed and updated as appropriate: allergies, current medications, past family history, past medical history, past social history, past surgical history and problem list.    Review of Systems   Constitutional: Negative for appetite change, chills, diaphoresis and unexpected weight change.   HENT: Negative for sore throat, trouble swallowing and voice change.    Eyes: Negative for visual disturbance.   Respiratory: Positive for cough, shortness of breath and wheezing. Negative for apnea and choking.    Cardiovascular: Negative for chest pain, palpitations and leg swelling.   Gastrointestinal: Negative for abdominal pain, constipation, diarrhea, nausea and vomiting.   Endocrine: Negative for cold intolerance, heat intolerance, polydipsia, polyphagia and polyuria.   Genitourinary: Negative for difficulty  "urinating and dysuria.   Musculoskeletal: Negative for gait problem.   Skin: Negative for rash and wound.   Neurological: Negative for syncope and light-headedness.   Hematological: Negative for adenopathy.   Psychiatric/Behavioral: Negative for agitation, behavioral problems and confusion.   All other systems reviewed and are negative.      Objective   /70 (BP Location: Left arm, Patient Position: Sitting, Cuff Size: Adult)  Pulse 58  Temp 97.6 °F (36.4 °C) (Oral)   Ht 66\" (167.6 cm)  Wt 222 lb (101 kg)  SpO2 90%  BMI 35.83 kg/m2  Physical Exam   Constitutional: She is oriented to person, place, and time.   HENT:   Head: Normocephalic and atraumatic.   Nose: Mucosal edema present.   Eyes: EOM are normal. Pupils are equal, round, and reactive to light.   Neck: Neck supple.   Cardiovascular: Normal rate, regular rhythm and normal heart sounds.    Pulmonary/Chest: She has rhonchi.   Vesicular breath sound bilaterally with prolonged expiratory phase   Abdominal: Soft. Bowel sounds are normal.   Musculoskeletal: Normal range of motion. She exhibits no deformity.   Neurological: She is alert and oriented to person, place, and time.   Skin: Skin is warm and dry.   Psychiatric: She has a normal mood and affect. Her behavior is normal.   Nursing note and vitals reviewed.        Radiology:  Ct Chest With Contrast    Result Date: 8/17/2017   1. Chronic appearing interstitial lung changes with mild basilar fibrosis. 2. No significant atelectasis identified and no effusions are noted. 3. 5 mm pulmonary nodule right lower lobe for which 12 month follow-up CT recommended (Lung-RADS category 2). 4. Cardiomegaly and probable reactive prominent but unenlarged mediastinal lymph nodes.  This report was finalized on 8/17/2017 2:59 PM by Dr. Kunal Castanon MD.        Xr Chest 1 View    Result Date: 6/21/2017  Stable radiographic appearance of the chest.  This report was finalized on 6/21/2017 8:26 AM by Dr. Bill Parham MD. "           Lab Results:  Lab on 09/24/2017   Component Date Value Ref Range Status   • Glucose 09/24/2017 121* 70 - 110 mg/dL Final   • BUN 09/24/2017 19  7 - 21 mg/dL Final   • Creatinine 09/24/2017 0.96  0.43 - 1.29 mg/dL Final   • Sodium 09/24/2017 138  135 - 153 mmol/L Final   • Potassium 09/24/2017 4.5  3.5 - 5.3 mmol/L Final   • Chloride 09/24/2017 104  99 - 112 mmol/L Final   • CO2 09/24/2017 27.1  24.3 - 31.9 mmol/L Final   • Calcium 09/24/2017 9.9  7.7 - 10.0 mg/dL Final   • Total Protein 09/24/2017 7.9  6.0 - 8.0 g/dL Final   • Albumin 09/24/2017 4.40  3.40 - 4.80 g/dL Final   • ALT (SGPT) 09/24/2017 27  10 - 36 U/L Final   • AST (SGOT) 09/24/2017 22  10 - 30 U/L Final   • Alkaline Phosphatase 09/24/2017 128* 35 - 104 U/L Final   • Total Bilirubin 09/24/2017 0.2  0.2 - 1.8 mg/dL Final   • eGFR Non  Amer 09/24/2017 57* >60 mL/min/1.73 Final   • Globulin 09/24/2017 3.5  gm/dL Final   • A/G Ratio 09/24/2017 1.3* 1.5 - 2.5 g/dL Final   • BUN/Creatinine Ratio 09/24/2017 19.8  7.0 - 25.0 Final   • Anion Gap 09/24/2017 6.9  3.6 - 11.2 mmol/L Final   • Osmolality Calc 09/24/2017 279.2  273.0 - 305.0 mOsm/kg Final       Assessment      ICD-10-CM ICD-9-CM   1. Simple chronic bronchitis J41.0 491.0   2. Interstitial lung disease J84.9 515   3. Lung nodule R91.1 793.11   4. OSWALDO (obstructive sleep apnea) G47.33 327.23                DISCUSSION:  I have reviewed the CT scan of the chest which was done in August.  No change noted, mild interstitial fibrotic changes are still present.  A small approximately 3 mm size right lower lobe nodule is also unchanged.    Had a good discussion with the patient regarding the use of pressure therapy.  Emphasized that she needs to try her best to keep the mask on.  Patient told me that she would try her best. Patient tells me that her nebulizer machine is not working, we are giving her a new machine.  We also sending her for a pulmonary function test for objective assessment of  her airways function.  We would reevaluate her again in approximately 6 months from now.  Plan    Orders Placed This Encounter   Procedures   • Home Nebulizer   • Pulmonary Function Test     New Medications Ordered This Visit   Medications   • ipratropium-albuterol (DUO-NEB) 0.5-2.5 mg/mL nebulizer     Sig: Take 3 mL by nebulization Every 4 (Four) Hours As Needed for Wheezing.     Dispense:  120 vial     Refill:  11                  Adilsond Annamarie Brewer MD, FCCP, FAASM  Pulmonary, Critical Care, and Sleep Medicine

## 2018-04-05 ENCOUNTER — HOSPITAL ENCOUNTER (OUTPATIENT)
Dept: RESPIRATORY THERAPY | Facility: HOSPITAL | Age: 73
Discharge: HOME | End: 2018-04-05
Attending: INTERNAL MEDICINE | Admitting: INTERNAL MEDICINE

## 2018-04-05 DIAGNOSIS — J84.9 INTERSTITIAL LUNG DISEASE (HCC): ICD-10-CM

## 2018-04-05 DIAGNOSIS — G47.33 OSA (OBSTRUCTIVE SLEEP APNEA): ICD-10-CM

## 2018-04-05 DIAGNOSIS — J41.0 SIMPLE CHRONIC BRONCHITIS (HCC): ICD-10-CM

## 2018-04-05 DIAGNOSIS — R91.1 LUNG NODULE: ICD-10-CM

## 2018-04-05 PROCEDURE — 94727 GAS DIL/WSHOT DETER LNG VOL: CPT | Performed by: INTERNAL MEDICINE

## 2018-04-05 PROCEDURE — 94727 GAS DIL/WSHOT DETER LNG VOL: CPT

## 2018-04-05 PROCEDURE — 94729 DIFFUSING CAPACITY: CPT | Performed by: INTERNAL MEDICINE

## 2018-04-05 PROCEDURE — 94729 DIFFUSING CAPACITY: CPT

## 2018-04-05 PROCEDURE — 94060 EVALUATION OF WHEEZING: CPT

## 2018-04-05 PROCEDURE — 94060 EVALUATION OF WHEEZING: CPT | Performed by: INTERNAL MEDICINE

## 2018-04-12 ENCOUNTER — OFFICE VISIT (OUTPATIENT)
Dept: PULMONOLOGY | Facility: CLINIC | Age: 73
End: 2018-04-12

## 2018-04-12 VITALS
DIASTOLIC BLOOD PRESSURE: 59 MMHG | OXYGEN SATURATION: 95 % | TEMPERATURE: 97.7 F | HEART RATE: 66 BPM | SYSTOLIC BLOOD PRESSURE: 109 MMHG | HEIGHT: 66 IN | BODY MASS INDEX: 36.71 KG/M2 | WEIGHT: 228.4 LBS

## 2018-04-12 DIAGNOSIS — J41.0 SIMPLE CHRONIC BRONCHITIS (HCC): Primary | ICD-10-CM

## 2018-04-12 DIAGNOSIS — G47.33 OSA (OBSTRUCTIVE SLEEP APNEA): ICD-10-CM

## 2018-04-12 DIAGNOSIS — J84.9 INTERSTITIAL LUNG DISEASE (HCC): ICD-10-CM

## 2018-04-12 DIAGNOSIS — R91.1 LUNG NODULE: ICD-10-CM

## 2018-04-12 PROCEDURE — 99214 OFFICE O/P EST MOD 30 MIN: CPT | Performed by: INTERNAL MEDICINE

## 2018-04-12 RX ORDER — ALBUTEROL SULFATE 90 UG/1
2 AEROSOL, METERED RESPIRATORY (INHALATION) EVERY 4 HOURS PRN
Qty: 1 INHALER | Refills: 11 | Status: ON HOLD | OUTPATIENT
Start: 2018-04-12 | End: 2018-06-13

## 2018-04-12 RX ORDER — IPRATROPIUM BROMIDE AND ALBUTEROL SULFATE 2.5; .5 MG/3ML; MG/3ML
3 SOLUTION RESPIRATORY (INHALATION) EVERY 4 HOURS PRN
Qty: 120 VIAL | Refills: 11 | Status: SHIPPED | OUTPATIENT
Start: 2018-04-12 | End: 2018-12-12

## 2018-04-12 NOTE — PROGRESS NOTES
Subjective   Mayra Hays is a 72 y.o. female who is being seen for Sleep Apnea    History of Present Illness   Patient returns after pulmonary function test.  Symptom wise she still continues to have shortness of breath on mild exertion as well as some wheezing and dry cough.  Past Medical History:   Diagnosis Date   • Collapsed lung    • COPD (chronic obstructive pulmonary disease)    • GERD (gastroesophageal reflux disease)    • Hyperlipidemia    • Hypertension    • Myocardial infarction      Past Surgical History:   Procedure Laterality Date   • CARDIAC CATHETERIZATION N/A 8/22/2017    Procedure: Left Heart Cath;  Surgeon: Jatinder Matias MD;  Location: [facility] CATH INVASIVE LOCATION;  Service:      History reviewed. No pertinent family history.   reports that she has quit smoking. She does not have any smokeless tobacco history on file.  No Known Allergies        Patient has not received a flu shot or a pneumonia vaccination.     The following portions of the patient's history were reviewed and updated as appropriate: allergies, current medications, past family history, past medical history, past social history, past surgical history and problem list.    Review of Systems   Constitutional: Negative for appetite change, chills, diaphoresis and unexpected weight change.   HENT: Negative for sore throat, trouble swallowing and voice change.    Eyes: Negative for visual disturbance.   Respiratory: Positive for cough and shortness of breath (On exertion.). Negative for apnea, choking and wheezing.    Cardiovascular: Negative for chest pain, palpitations and leg swelling.   Gastrointestinal: Negative for abdominal pain, constipation, diarrhea, nausea and vomiting.   Endocrine: Negative for cold intolerance, heat intolerance, polydipsia, polyphagia and polyuria.   Genitourinary: Negative for difficulty urinating and dysuria.   Musculoskeletal: Negative for gait problem.   Skin: Negative for rash and wound.  "  Neurological: Negative for syncope and light-headedness.   Hematological: Negative for adenopathy.   Psychiatric/Behavioral: Negative for agitation, behavioral problems and confusion.   All other systems reviewed and are negative.      Objective   /59   Pulse 66   Temp 97.7 °F (36.5 °C)   Ht 167.6 cm (65.98\")   Wt 104 kg (228 lb 6.4 oz)   SpO2 95%   BMI 36.88 kg/m²   Physical Exam   Constitutional: She is oriented to person, place, and time.   HENT:   Head: Normocephalic and atraumatic.   Nose: Mucosal edema present.   Eyes: EOM are normal. Pupils are equal, round, and reactive to light.   Neck: Neck supple.   Cardiovascular: Normal rate, regular rhythm and normal heart sounds.    Pulmonary/Chest: She has rhonchi.   Vesicular breath sound bilaterally with prolonged expiratory phase   Abdominal: Soft. Bowel sounds are normal.   Musculoskeletal: Normal range of motion. She exhibits no deformity.   Neurological: She is alert and oriented to person, place, and time.   Skin: Skin is warm and dry.   Psychiatric: She has a normal mood and affect. Her behavior is normal.   Nursing note and vitals reviewed.        Radiology:  No Images in the past 120 days found..    Lab Results:  Lab on 09/24/2017   Component Date Value Ref Range Status   • Glucose 09/24/2017 121* 70 - 110 mg/dL Final   • BUN 09/24/2017 19  7 - 21 mg/dL Final   • Creatinine 09/24/2017 0.96  0.43 - 1.29 mg/dL Final   • Sodium 09/24/2017 138  135 - 153 mmol/L Final   • Potassium 09/24/2017 4.5  3.5 - 5.3 mmol/L Final   • Chloride 09/24/2017 104  99 - 112 mmol/L Final   • CO2 09/24/2017 27.1  24.3 - 31.9 mmol/L Final   • Calcium 09/24/2017 9.9  7.7 - 10.0 mg/dL Final   • Total Protein 09/24/2017 7.9  6.0 - 8.0 g/dL Final   • Albumin 09/24/2017 4.40  3.40 - 4.80 g/dL Final   • ALT (SGPT) 09/24/2017 27  10 - 36 U/L Final   • AST (SGOT) 09/24/2017 22  10 - 30 U/L Final   • Alkaline Phosphatase 09/24/2017 128* 35 - 104 U/L Final   • Total Bilirubin " 09/24/2017 0.2  0.2 - 1.8 mg/dL Final   • eGFR Non  Amer 09/24/2017 57* >60 mL/min/1.73 Final   • Globulin 09/24/2017 3.5  gm/dL Final   • A/G Ratio 09/24/2017 1.3* 1.5 - 2.5 g/dL Final   • BUN/Creatinine Ratio 09/24/2017 19.8  7.0 - 25.0 Final   • Anion Gap 09/24/2017 6.9  3.6 - 11.2 mmol/L Final   • Osmolality Calc 09/24/2017 279.2  273.0 - 305.0 mOsm/kg Final       Assessment      ICD-10-CM ICD-9-CM   1. Simple chronic bronchitis J41.0 491.0   2. Interstitial lung disease J84.9 515   3. OSWALDO (obstructive sleep apnea) G47.33 327.23   4. Lung nodule R91.1 793.11                DISCUSSION:  Reviewed the pulmonary function test, FEV1 was 1.29 L which was 53% predicted.  (Her last pulmonary function test done in September 2017 showed FEV1 of 1.24 L).  FEV1 to ratio was reduced to 67.  Lung volumes showed reduced total lung capacity to 85% predicted.  Diffusion capacity is 90% predicted after volume correction.  Compared to her last pulmonary function test    Symptomatically patient is doing much better now.  We are to continue her current regimen.  I'm giving her refills and I'll see her again in approximately 6 months from now.  Plan    No orders of the defined types were placed in this encounter.    New Medications Ordered This Visit   Medications   • albuterol (PROAIR HFA) 108 (90 Base) MCG/ACT inhaler     Sig: Inhale 2 puffs Every 4 (Four) Hours As Needed for Shortness of Air.     Dispense:  1 inhaler     Refill:  11   • ipratropium-albuterol (DUO-NEB) 0.5-2.5 mg/mL nebulizer     Sig: Take 3 mL by nebulization Every 4 (Four) Hours As Needed for Wheezing.     Dispense:  120 vial     Refill:  11                  Hemalatha Brewer MD, FCCP, [facility]  Pulmonary, Critical Care, and Sleep Medicine

## 2018-05-18 ENCOUNTER — HOSPITAL ENCOUNTER (OUTPATIENT)
Dept: ULTRASOUND IMAGING | Facility: HOSPITAL | Age: 73
Discharge: HOME | End: 2018-05-18
Admitting: INTERNAL MEDICINE

## 2018-05-18 ENCOUNTER — TRANSCRIBE ORDERS (OUTPATIENT)
Dept: ADMINISTRATIVE | Facility: HOSPITAL | Age: 73
End: 2018-05-18

## 2018-05-18 DIAGNOSIS — E04.9 ENLARGEMENT OF THYROID: Primary | ICD-10-CM

## 2018-05-18 DIAGNOSIS — E04.9 ENLARGEMENT OF THYROID: ICD-10-CM

## 2018-05-18 PROCEDURE — 76536 US EXAM OF HEAD AND NECK: CPT | Performed by: RADIOLOGY

## 2018-05-18 PROCEDURE — 76536 US EXAM OF HEAD AND NECK: CPT

## 2018-06-12 ENCOUNTER — HOSPITAL ENCOUNTER (INPATIENT)
Facility: HOSPITAL | Age: 73
LOS: 4 days | Discharge: HOME | End: 2018-06-16
Attending: EMERGENCY MEDICINE | Admitting: INTERNAL MEDICINE

## 2018-06-12 ENCOUNTER — APPOINTMENT (OUTPATIENT)
Dept: GENERAL RADIOLOGY | Facility: HOSPITAL | Age: 73
End: 2018-06-12

## 2018-06-12 DIAGNOSIS — J18.9 PNEUMONIA OF LEFT LOWER LOBE DUE TO INFECTIOUS ORGANISM: Primary | ICD-10-CM

## 2018-06-12 DIAGNOSIS — Z74.09 IMMOBILITY: ICD-10-CM

## 2018-06-12 LAB
A-A DO2: 47.9 MMHG (ref 0–300)
ALBUMIN SERPL-MCNC: 4.5 G/DL (ref 3.4–4.8)
ALBUMIN/GLOB SERPL: 1.3 G/DL (ref 1.5–2.5)
ALP SERPL-CCNC: 89 U/L (ref 35–104)
ALT SERPL W P-5'-P-CCNC: 20 U/L (ref 10–36)
ANION GAP SERPL CALCULATED.3IONS-SCNC: 8.8 MMOL/L (ref 3.6–11.2)
ARTERIAL PATENCY WRIST A: POSITIVE
AST SERPL-CCNC: 18 U/L (ref 10–30)
ATMOSPHERIC PRESS: 728 MMHG
BASE EXCESS BLDA CALC-SCNC: 2.6 MMOL/L
BASOPHILS # BLD AUTO: 0.04 10*3/MM3 (ref 0–0.3)
BASOPHILS NFR BLD AUTO: 0.2 % (ref 0–2)
BDY SITE: ABNORMAL
BILIRUB SERPL-MCNC: 1 MG/DL (ref 0.2–1.8)
BNP SERPL-MCNC: 50 PG/ML (ref 0–100)
BODY TEMPERATURE: 98.6 C
BUN BLD-MCNC: 27 MG/DL (ref 7–21)
BUN/CREAT SERPL: 20.5 (ref 7–25)
CALCIUM SPEC-SCNC: 9.4 MG/DL (ref 7.7–10)
CHLORIDE SERPL-SCNC: 102 MMOL/L (ref 99–112)
CK MB SERPL-CCNC: <0.18 NG/ML (ref 0–5)
CK MB SERPL-RTO: NORMAL % (ref 0–3)
CK SERPL-CCNC: 44 U/L (ref 24–173)
CO2 SERPL-SCNC: 27.2 MMOL/L (ref 24.3–31.9)
COHGB MFR BLD: 1.5 % (ref 0–5)
CREAT BLD-MCNC: 1.32 MG/DL (ref 0.43–1.29)
CRP SERPL-MCNC: 25.66 MG/DL (ref 0–0.99)
D-LACTATE SERPL-SCNC: 1.6 MMOL/L (ref 0.5–2)
DEPRECATED RDW RBC AUTO: 41.9 FL (ref 37–54)
EOSINOPHIL # BLD AUTO: 0.02 10*3/MM3 (ref 0–0.7)
EOSINOPHIL NFR BLD AUTO: 0.1 % (ref 0–7)
ERYTHROCYTE [DISTWIDTH] IN BLOOD BY AUTOMATED COUNT: 13.4 % (ref 11.5–14.5)
GFR SERPL CREATININE-BSD FRML MDRD: 40 ML/MIN/1.73
GLOBULIN UR ELPH-MCNC: 3.4 GM/DL
GLUCOSE BLD-MCNC: 156 MG/DL (ref 70–110)
GLUCOSE BLDC GLUCOMTR-MCNC: 213 MG/DL (ref 70–130)
HBA1C MFR BLD: 6.5 % (ref 4.5–5.7)
HCO3 BLDA-SCNC: 26.4 MMOL/L (ref 22–26)
HCT VFR BLD AUTO: 39.8 % (ref 37–47)
HCT VFR BLD CALC: 39 % (ref 37–47)
HGB BLD-MCNC: 12.7 G/DL (ref 12–16)
HGB BLDA-MCNC: 13.1 G/DL (ref 12–16)
HOLD SPECIMEN: NORMAL
HOLD SPECIMEN: NORMAL
HOROWITZ INDEX BLD+IHG-RTO: 21 %
IMM GRANULOCYTES # BLD: 0.09 10*3/MM3 (ref 0–0.03)
IMM GRANULOCYTES NFR BLD: 0.5 % (ref 0–0.5)
LYMPHOCYTES # BLD AUTO: 1.36 10*3/MM3 (ref 1–3)
LYMPHOCYTES NFR BLD AUTO: 7.2 % (ref 16–46)
M PNEUMO IGM SER QL: NEGATIVE
MCH RBC QN AUTO: 27.9 PG (ref 27–33)
MCHC RBC AUTO-ENTMCNC: 31.9 G/DL (ref 33–37)
MCV RBC AUTO: 87.3 FL (ref 80–94)
METHGB BLD QL: 0.5 % (ref 0–3)
MODALITY: ABNORMAL
MONOCYTES # BLD AUTO: 1.73 10*3/MM3 (ref 0.1–0.9)
MONOCYTES NFR BLD AUTO: 9.2 % (ref 0–12)
MYOGLOBIN SERPL-MCNC: 76 NG/ML (ref 0–109)
NEUTROPHILS # BLD AUTO: 15.56 10*3/MM3 (ref 1.4–6.5)
NEUTROPHILS NFR BLD AUTO: 82.8 % (ref 40–75)
OSMOLALITY SERPL CALC.SUM OF ELEC: 284 MOSM/KG (ref 273–305)
OXYHGB MFR BLDV: 85.5 % (ref 85–100)
PCO2 BLDA: 37.8 MM HG (ref 35–45)
PH BLDA: 7.46 PH UNITS (ref 7.35–7.45)
PLATELET # BLD AUTO: 192 10*3/MM3 (ref 130–400)
PMV BLD AUTO: 11.8 FL (ref 6–10)
PO2 BLDA: 49.8 MM HG (ref 80–100)
POTASSIUM BLD-SCNC: 4.3 MMOL/L (ref 3.5–5.3)
PROT SERPL-MCNC: 7.9 G/DL (ref 6–8)
RBC # BLD AUTO: 4.56 10*6/MM3 (ref 4.2–5.4)
SAO2 % BLDCOA: 87.2 % (ref 90–100)
SODIUM BLD-SCNC: 138 MMOL/L (ref 135–153)
TROPONIN I SERPL-MCNC: 0.01 NG/ML
TROPONIN I SERPL-MCNC: 0.01 NG/ML
WBC NRBC COR # BLD: 18.8 10*3/MM3 (ref 4.5–12.5)
WHOLE BLOOD HOLD SPECIMEN: NORMAL
WHOLE BLOOD HOLD SPECIMEN: NORMAL

## 2018-06-12 PROCEDURE — 85025 COMPLETE CBC W/AUTO DIFF WBC: CPT | Performed by: FAMILY MEDICINE

## 2018-06-12 PROCEDURE — 86738 MYCOPLASMA ANTIBODY: CPT | Performed by: HOSPITALIST

## 2018-06-12 PROCEDURE — 84484 ASSAY OF TROPONIN QUANT: CPT | Performed by: HOSPITALIST

## 2018-06-12 PROCEDURE — 93010 ELECTROCARDIOGRAM REPORT: CPT | Performed by: INTERNAL MEDICINE

## 2018-06-12 PROCEDURE — 94799 UNLISTED PULMONARY SVC/PX: CPT

## 2018-06-12 PROCEDURE — 83605 ASSAY OF LACTIC ACID: CPT | Performed by: EMERGENCY MEDICINE

## 2018-06-12 PROCEDURE — 80053 COMPREHEN METABOLIC PANEL: CPT | Performed by: EMERGENCY MEDICINE

## 2018-06-12 PROCEDURE — 25010000002 CEFTRIAXONE: Performed by: EMERGENCY MEDICINE

## 2018-06-12 PROCEDURE — 86140 C-REACTIVE PROTEIN: CPT | Performed by: HOSPITALIST

## 2018-06-12 PROCEDURE — 82375 ASSAY CARBOXYHB QUANT: CPT | Performed by: EMERGENCY MEDICINE

## 2018-06-12 PROCEDURE — 25010000002 METHYLPREDNISOLONE PER 125 MG: Performed by: EMERGENCY MEDICINE

## 2018-06-12 PROCEDURE — 63710000001 INSULIN ASPART PER 5 UNITS: Performed by: HOSPITALIST

## 2018-06-12 PROCEDURE — 82962 GLUCOSE BLOOD TEST: CPT

## 2018-06-12 PROCEDURE — 82805 BLOOD GASES W/O2 SATURATION: CPT | Performed by: EMERGENCY MEDICINE

## 2018-06-12 PROCEDURE — 93005 ELECTROCARDIOGRAM TRACING: CPT | Performed by: EMERGENCY MEDICINE

## 2018-06-12 PROCEDURE — 25010000002 HEPARIN (PORCINE) PER 1000 UNITS: Performed by: HOSPITALIST

## 2018-06-12 PROCEDURE — 87040 BLOOD CULTURE FOR BACTERIA: CPT | Performed by: FAMILY MEDICINE

## 2018-06-12 PROCEDURE — 94640 AIRWAY INHALATION TREATMENT: CPT

## 2018-06-12 PROCEDURE — 93005 ELECTROCARDIOGRAM TRACING: CPT | Performed by: FAMILY MEDICINE

## 2018-06-12 PROCEDURE — 71045 X-RAY EXAM CHEST 1 VIEW: CPT

## 2018-06-12 PROCEDURE — 87040 BLOOD CULTURE FOR BACTERIA: CPT | Performed by: EMERGENCY MEDICINE

## 2018-06-12 PROCEDURE — 99284 EMERGENCY DEPT VISIT MOD MDM: CPT

## 2018-06-12 PROCEDURE — 83036 HEMOGLOBIN GLYCOSYLATED A1C: CPT | Performed by: HOSPITALIST

## 2018-06-12 PROCEDURE — 36600 WITHDRAWAL OF ARTERIAL BLOOD: CPT | Performed by: EMERGENCY MEDICINE

## 2018-06-12 PROCEDURE — 82553 CREATINE MB FRACTION: CPT | Performed by: HOSPITALIST

## 2018-06-12 PROCEDURE — 99223 1ST HOSP IP/OBS HIGH 75: CPT | Performed by: HOSPITALIST

## 2018-06-12 PROCEDURE — 83050 HGB METHEMOGLOBIN QUAN: CPT | Performed by: EMERGENCY MEDICINE

## 2018-06-12 PROCEDURE — 84484 ASSAY OF TROPONIN QUANT: CPT | Performed by: EMERGENCY MEDICINE

## 2018-06-12 PROCEDURE — 83874 ASSAY OF MYOGLOBIN: CPT | Performed by: HOSPITALIST

## 2018-06-12 PROCEDURE — 71045 X-RAY EXAM CHEST 1 VIEW: CPT | Performed by: RADIOLOGY

## 2018-06-12 PROCEDURE — 83880 ASSAY OF NATRIURETIC PEPTIDE: CPT | Performed by: HOSPITALIST

## 2018-06-12 PROCEDURE — 82550 ASSAY OF CK (CPK): CPT | Performed by: HOSPITALIST

## 2018-06-12 RX ORDER — IPRATROPIUM BROMIDE AND ALBUTEROL SULFATE 2.5; .5 MG/3ML; MG/3ML
3 SOLUTION RESPIRATORY (INHALATION)
Status: COMPLETED | OUTPATIENT
Start: 2018-06-12 | End: 2018-06-12

## 2018-06-12 RX ORDER — SODIUM CHLORIDE 0.9 % (FLUSH) 0.9 %
1-10 SYRINGE (ML) INJECTION AS NEEDED
Status: DISCONTINUED | OUTPATIENT
Start: 2018-06-12 | End: 2018-06-16 | Stop reason: HOSPADM

## 2018-06-12 RX ORDER — SODIUM CHLORIDE 0.9 % (FLUSH) 0.9 %
10 SYRINGE (ML) INJECTION AS NEEDED
Status: DISCONTINUED | OUTPATIENT
Start: 2018-06-12 | End: 2018-06-16 | Stop reason: HOSPADM

## 2018-06-12 RX ORDER — SODIUM CHLORIDE 9 MG/ML
50 INJECTION, SOLUTION INTRAVENOUS CONTINUOUS
Status: DISCONTINUED | OUTPATIENT
Start: 2018-06-12 | End: 2018-06-15

## 2018-06-12 RX ORDER — NITROGLYCERIN 0.4 MG/1
0.4 TABLET SUBLINGUAL
Status: DISCONTINUED | OUTPATIENT
Start: 2018-06-12 | End: 2018-06-16 | Stop reason: HOSPADM

## 2018-06-12 RX ORDER — HEPARIN SODIUM 5000 [USP'U]/ML
5000 INJECTION, SOLUTION INTRAVENOUS; SUBCUTANEOUS EVERY 12 HOURS SCHEDULED
Status: DISCONTINUED | OUTPATIENT
Start: 2018-06-12 | End: 2018-06-16 | Stop reason: HOSPADM

## 2018-06-12 RX ORDER — IPRATROPIUM BROMIDE AND ALBUTEROL SULFATE 2.5; .5 MG/3ML; MG/3ML
3 SOLUTION RESPIRATORY (INHALATION)
Status: DISCONTINUED | OUTPATIENT
Start: 2018-06-12 | End: 2018-06-16 | Stop reason: HOSPADM

## 2018-06-12 RX ORDER — METHYLPREDNISOLONE SODIUM SUCCINATE 40 MG/ML
40 INJECTION, POWDER, LYOPHILIZED, FOR SOLUTION INTRAMUSCULAR; INTRAVENOUS EVERY 12 HOURS
Status: DISCONTINUED | OUTPATIENT
Start: 2018-06-13 | End: 2018-06-14

## 2018-06-12 RX ORDER — DEXTROSE MONOHYDRATE 25 G/50ML
25 INJECTION, SOLUTION INTRAVENOUS
Status: DISCONTINUED | OUTPATIENT
Start: 2018-06-12 | End: 2018-06-16 | Stop reason: HOSPADM

## 2018-06-12 RX ORDER — NICOTINE POLACRILEX 4 MG
15 LOZENGE BUCCAL
Status: DISCONTINUED | OUTPATIENT
Start: 2018-06-12 | End: 2018-06-16 | Stop reason: HOSPADM

## 2018-06-12 RX ORDER — METHYLPREDNISOLONE SODIUM SUCCINATE 125 MG/2ML
125 INJECTION, POWDER, LYOPHILIZED, FOR SOLUTION INTRAMUSCULAR; INTRAVENOUS ONCE
Status: COMPLETED | OUTPATIENT
Start: 2018-06-12 | End: 2018-06-12

## 2018-06-12 RX ADMIN — METHYLPREDNISOLONE SODIUM SUCCINATE 125 MG: 125 INJECTION, POWDER, FOR SOLUTION INTRAMUSCULAR; INTRAVENOUS at 18:45

## 2018-06-12 RX ADMIN — IPRATROPIUM BROMIDE AND ALBUTEROL SULFATE 3 ML: .5; 3 SOLUTION RESPIRATORY (INHALATION) at 21:50

## 2018-06-12 RX ADMIN — INSULIN ASPART 3 UNITS: 100 INJECTION, SOLUTION INTRAVENOUS; SUBCUTANEOUS at 22:02

## 2018-06-12 RX ADMIN — IPRATROPIUM BROMIDE AND ALBUTEROL SULFATE 3 ML: .5; 3 SOLUTION RESPIRATORY (INHALATION) at 19:03

## 2018-06-12 RX ADMIN — CEFTRIAXONE 1 G: 1 INJECTION, POWDER, FOR SOLUTION INTRAMUSCULAR; INTRAVENOUS at 18:48

## 2018-06-12 RX ADMIN — HEPARIN SODIUM 5000 UNITS: 5000 INJECTION, SOLUTION INTRAVENOUS; SUBCUTANEOUS at 22:02

## 2018-06-12 RX ADMIN — IPRATROPIUM BROMIDE AND ALBUTEROL SULFATE 3 ML: .5; 3 SOLUTION RESPIRATORY (INHALATION) at 18:32

## 2018-06-12 RX ADMIN — DOXYCYCLINE 100 MG: 100 INJECTION, POWDER, LYOPHILIZED, FOR SOLUTION INTRAVENOUS at 19:54

## 2018-06-12 RX ADMIN — SODIUM CHLORIDE 75 ML/HR: 9 INJECTION, SOLUTION INTRAVENOUS at 22:02

## 2018-06-12 NOTE — ED NOTES
Dr Kennedy notified pt's sepsis positive screen, abnormal vital signs.     Scarlet Moreno RN  06/12/18 2839

## 2018-06-13 LAB
ALBUMIN SERPL-MCNC: 4.1 G/DL (ref 3.4–4.8)
ALBUMIN/GLOB SERPL: 1.2 G/DL (ref 1.5–2.5)
ALP SERPL-CCNC: 76 U/L (ref 35–104)
ALT SERPL W P-5'-P-CCNC: 18 U/L (ref 10–36)
ANION GAP SERPL CALCULATED.3IONS-SCNC: 10.6 MMOL/L (ref 3.6–11.2)
ANION GAP SERPL CALCULATED.3IONS-SCNC: 10.9 MMOL/L (ref 3.6–11.2)
AST SERPL-CCNC: 16 U/L (ref 10–30)
BILIRUB SERPL-MCNC: 0.6 MG/DL (ref 0.2–1.8)
BILIRUB UR QL STRIP: NEGATIVE
BUN BLD-MCNC: 30 MG/DL (ref 7–21)
BUN BLD-MCNC: 40 MG/DL (ref 7–21)
BUN/CREAT SERPL: 19.4 (ref 7–25)
BUN/CREAT SERPL: 26.5 (ref 7–25)
CALCIUM SPEC-SCNC: 8.9 MG/DL (ref 7.7–10)
CALCIUM SPEC-SCNC: 9 MG/DL (ref 7.7–10)
CHLORIDE SERPL-SCNC: 103 MMOL/L (ref 99–112)
CHLORIDE SERPL-SCNC: 104 MMOL/L (ref 99–112)
CK MB SERPL-CCNC: 0.48 NG/ML (ref 0–5)
CK MB SERPL-CCNC: <0.18 NG/ML (ref 0–5)
CK MB SERPL-RTO: 1.2 % (ref 0–3)
CK MB SERPL-RTO: NORMAL % (ref 0–3)
CK SERPL-CCNC: 41 U/L (ref 24–173)
CK SERPL-CCNC: 41 U/L (ref 24–173)
CLARITY UR: ABNORMAL
CO2 SERPL-SCNC: 18.4 MMOL/L (ref 24.3–31.9)
CO2 SERPL-SCNC: 23.1 MMOL/L (ref 24.3–31.9)
COLOR UR: YELLOW
CREAT BLD-MCNC: 1.51 MG/DL (ref 0.43–1.29)
CREAT BLD-MCNC: 1.55 MG/DL (ref 0.43–1.29)
DEPRECATED RDW RBC AUTO: 44.5 FL (ref 37–54)
ERYTHROCYTE [DISTWIDTH] IN BLOOD BY AUTOMATED COUNT: 13.7 % (ref 11.5–14.5)
GFR SERPL CREATININE-BSD FRML MDRD: 33 ML/MIN/1.73
GFR SERPL CREATININE-BSD FRML MDRD: 34 ML/MIN/1.73
GLOBULIN UR ELPH-MCNC: 3.4 GM/DL
GLUCOSE BLD-MCNC: 309 MG/DL (ref 70–110)
GLUCOSE BLD-MCNC: 492 MG/DL (ref 70–110)
GLUCOSE BLDC GLUCOMTR-MCNC: 316 MG/DL (ref 70–130)
GLUCOSE BLDC GLUCOMTR-MCNC: 393 MG/DL (ref 70–130)
GLUCOSE BLDC GLUCOMTR-MCNC: 431 MG/DL (ref 70–130)
GLUCOSE BLDC GLUCOMTR-MCNC: 447 MG/DL (ref 70–130)
GLUCOSE BLDC GLUCOMTR-MCNC: 449 MG/DL (ref 70–130)
GLUCOSE BLDC GLUCOMTR-MCNC: 505 MG/DL (ref 70–130)
GLUCOSE UR STRIP-MCNC: NEGATIVE MG/DL
HCT VFR BLD AUTO: 37.8 % (ref 37–47)
HGB BLD-MCNC: 11.7 G/DL (ref 12–16)
HGB UR QL STRIP.AUTO: NEGATIVE
HYPOCHROMIA BLD QL: ABNORMAL
KETONES UR QL STRIP: NEGATIVE
L PNEUMO1 AG UR QL IA: NEGATIVE
LEUKOCYTE ESTERASE UR QL STRIP.AUTO: NEGATIVE
LYMPHOCYTES # BLD MANUAL: 1.11 10*3/MM3 (ref 1–3)
LYMPHOCYTES NFR BLD MANUAL: 3 % (ref 0–12)
LYMPHOCYTES NFR BLD MANUAL: 6 % (ref 16–46)
MAGNESIUM SERPL-MCNC: 1.9 MG/DL (ref 1.7–2.6)
MCH RBC QN AUTO: 27.7 PG (ref 27–33)
MCHC RBC AUTO-ENTMCNC: 31 G/DL (ref 33–37)
MCV RBC AUTO: 89.4 FL (ref 80–94)
MONOCYTES # BLD AUTO: 0.56 10*3/MM3 (ref 0.1–0.9)
MYOGLOBIN SERPL-MCNC: 79 NG/ML (ref 0–109)
MYOGLOBIN SERPL-MCNC: 94 NG/ML (ref 0–109)
NEUTROPHILS # BLD AUTO: 16.86 10*3/MM3 (ref 1.4–6.5)
NEUTROPHILS NFR BLD MANUAL: 82 % (ref 40–75)
NEUTS BAND NFR BLD MANUAL: 9 % (ref 0–8)
NITRITE UR QL STRIP: NEGATIVE
OSMOLALITY SERPL CALC.SUM OF ELEC: 291.7 MOSM/KG (ref 273–305)
OSMOLALITY SERPL CALC.SUM OF ELEC: 298 MOSM/KG (ref 273–305)
PH UR STRIP.AUTO: <=5 [PH] (ref 5–8)
PLAT MORPH BLD: NORMAL
PLATELET # BLD AUTO: 178 10*3/MM3 (ref 130–400)
PMV BLD AUTO: 12.1 FL (ref 6–10)
POTASSIUM BLD-SCNC: 4.1 MMOL/L (ref 3.5–5.3)
POTASSIUM BLD-SCNC: 4.1 MMOL/L (ref 3.5–5.3)
PROT SERPL-MCNC: 7.5 G/DL (ref 6–8)
PROT UR QL STRIP: NEGATIVE
RBC # BLD AUTO: 4.23 10*6/MM3 (ref 4.2–5.4)
SODIUM BLD-SCNC: 133 MMOL/L (ref 135–153)
SODIUM BLD-SCNC: 137 MMOL/L (ref 135–153)
SP GR UR STRIP: 1.02 (ref 1–1.03)
TROPONIN I SERPL-MCNC: 0.01 NG/ML
TROPONIN I SERPL-MCNC: <0.006 NG/ML
UROBILINOGEN UR QL STRIP: ABNORMAL
WBC NRBC COR # BLD: 18.53 10*3/MM3 (ref 4.5–12.5)

## 2018-06-13 PROCEDURE — 25010000002 METHYLPREDNISOLONE PER 40 MG: Performed by: HOSPITALIST

## 2018-06-13 PROCEDURE — 94799 UNLISTED PULMONARY SVC/PX: CPT

## 2018-06-13 PROCEDURE — 25010000002 HEPARIN (PORCINE) PER 1000 UNITS: Performed by: HOSPITALIST

## 2018-06-13 PROCEDURE — 82962 GLUCOSE BLOOD TEST: CPT

## 2018-06-13 PROCEDURE — 85027 COMPLETE CBC AUTOMATED: CPT | Performed by: HOSPITALIST

## 2018-06-13 PROCEDURE — 83874 ASSAY OF MYOGLOBIN: CPT | Performed by: HOSPITALIST

## 2018-06-13 PROCEDURE — 80053 COMPREHEN METABOLIC PANEL: CPT | Performed by: HOSPITALIST

## 2018-06-13 PROCEDURE — 82553 CREATINE MB FRACTION: CPT | Performed by: HOSPITALIST

## 2018-06-13 PROCEDURE — 82550 ASSAY OF CK (CPK): CPT | Performed by: HOSPITALIST

## 2018-06-13 PROCEDURE — 63710000001 INSULIN ASPART PER 5 UNITS: Performed by: INTERNAL MEDICINE

## 2018-06-13 PROCEDURE — 25010000002 MAGNESIUM SULFATE IN D5W 1G/100ML (PREMIX) 1-5 GM/100ML-% SOLUTION: Performed by: INTERNAL MEDICINE

## 2018-06-13 PROCEDURE — 87899 AGENT NOS ASSAY W/OPTIC: CPT | Performed by: HOSPITALIST

## 2018-06-13 PROCEDURE — 25010000002 CEFTRIAXONE: Performed by: HOSPITALIST

## 2018-06-13 PROCEDURE — 81003 URINALYSIS AUTO W/O SCOPE: CPT | Performed by: EMERGENCY MEDICINE

## 2018-06-13 PROCEDURE — 63710000001 INSULIN ASPART PER 5 UNITS: Performed by: HOSPITALIST

## 2018-06-13 PROCEDURE — 84484 ASSAY OF TROPONIN QUANT: CPT | Performed by: HOSPITALIST

## 2018-06-13 PROCEDURE — 85007 BL SMEAR W/DIFF WBC COUNT: CPT | Performed by: HOSPITALIST

## 2018-06-13 PROCEDURE — 83735 ASSAY OF MAGNESIUM: CPT | Performed by: PHYSICIAN ASSISTANT

## 2018-06-13 PROCEDURE — 99233 SBSQ HOSP IP/OBS HIGH 50: CPT | Performed by: INTERNAL MEDICINE

## 2018-06-13 PROCEDURE — 63710000001 INSULIN ASPART PER 5 UNITS: Performed by: PHYSICIAN ASSISTANT

## 2018-06-13 PROCEDURE — 63710000001 INSULIN DETEMIR PER 5 UNITS: Performed by: PHYSICIAN ASSISTANT

## 2018-06-13 RX ORDER — GABAPENTIN 300 MG/1
300 CAPSULE ORAL NIGHTLY
Status: CANCELLED | OUTPATIENT
Start: 2018-06-13

## 2018-06-13 RX ORDER — ACETAMINOPHEN 325 MG/1
650 TABLET ORAL EVERY 6 HOURS PRN
COMMUNITY
End: 2018-06-16 | Stop reason: HOSPADM

## 2018-06-13 RX ORDER — PANTOPRAZOLE SODIUM 40 MG/1
40 TABLET, DELAYED RELEASE ORAL EVERY MORNING
Status: CANCELLED | OUTPATIENT
Start: 2018-06-14

## 2018-06-13 RX ORDER — MAGNESIUM SULFATE 1 G/100ML
1 INJECTION INTRAVENOUS
Status: COMPLETED | OUTPATIENT
Start: 2018-06-13 | End: 2018-06-13

## 2018-06-13 RX ORDER — FUROSEMIDE 20 MG/1
20 TABLET ORAL DAILY
Status: CANCELLED | OUTPATIENT
Start: 2018-06-13

## 2018-06-13 RX ORDER — LISINOPRIL 2.5 MG/1
5 TABLET ORAL DAILY
Status: CANCELLED | OUTPATIENT
Start: 2018-06-13

## 2018-06-13 RX ORDER — BUDESONIDE AND FORMOTEROL FUMARATE DIHYDRATE 80; 4.5 UG/1; UG/1
2 AEROSOL RESPIRATORY (INHALATION)
Status: DISCONTINUED | OUTPATIENT
Start: 2018-06-13 | End: 2018-06-16 | Stop reason: HOSPADM

## 2018-06-13 RX ORDER — FERROUS SULFATE 325(65) MG
325 TABLET ORAL 2 TIMES DAILY
Status: CANCELLED | OUTPATIENT
Start: 2018-06-13

## 2018-06-13 RX ORDER — L.ACID,PARA/B.BIFIDUM/S.THERM 8B CELL
1 CAPSULE ORAL DAILY
Status: DISCONTINUED | OUTPATIENT
Start: 2018-06-13 | End: 2018-06-16 | Stop reason: HOSPADM

## 2018-06-13 RX ORDER — PANTOPRAZOLE SODIUM 40 MG/1
40 TABLET, DELAYED RELEASE ORAL DAILY
Status: CANCELLED | OUTPATIENT
Start: 2018-06-13

## 2018-06-13 RX ORDER — FERROUS SULFATE 325(65) MG
325 TABLET ORAL 2 TIMES DAILY WITH MEALS
Status: CANCELLED | OUTPATIENT
Start: 2018-06-13

## 2018-06-13 RX ORDER — POTASSIUM CHLORIDE 20 MEQ/1
20 TABLET, EXTENDED RELEASE ORAL DAILY
COMMUNITY
End: 2018-06-27 | Stop reason: HOSPADM

## 2018-06-13 RX ORDER — ACETAMINOPHEN 325 MG/1
650 TABLET ORAL EVERY 6 HOURS PRN
Status: CANCELLED | OUTPATIENT
Start: 2018-06-13

## 2018-06-13 RX ORDER — ATENOLOL 25 MG/1
25 TABLET ORAL DAILY
Status: CANCELLED | OUTPATIENT
Start: 2018-06-13

## 2018-06-13 RX ORDER — BUDESONIDE AND FORMOTEROL FUMARATE DIHYDRATE 80; 4.5 UG/1; UG/1
2 AEROSOL RESPIRATORY (INHALATION)
Status: CANCELLED | OUTPATIENT
Start: 2018-06-13

## 2018-06-13 RX ORDER — ATORVASTATIN CALCIUM 20 MG/1
20 TABLET, FILM COATED ORAL DAILY
Status: DISCONTINUED | OUTPATIENT
Start: 2018-06-13 | End: 2018-06-16 | Stop reason: HOSPADM

## 2018-06-13 RX ORDER — IPRATROPIUM BROMIDE AND ALBUTEROL SULFATE 2.5; .5 MG/3ML; MG/3ML
3 SOLUTION RESPIRATORY (INHALATION) EVERY 4 HOURS PRN
Status: CANCELLED | OUTPATIENT
Start: 2018-06-13

## 2018-06-13 RX ORDER — TRAZODONE HYDROCHLORIDE 50 MG/1
50 TABLET ORAL NIGHTLY
Status: CANCELLED | OUTPATIENT
Start: 2018-06-13

## 2018-06-13 RX ORDER — NYSTATIN 100000 [USP'U]/G
1 POWDER TOPICAL 3 TIMES DAILY
COMMUNITY
End: 2018-12-12

## 2018-06-13 RX ORDER — NYSTATIN 100000 [USP'U]/G
1 POWDER TOPICAL 3 TIMES DAILY
Status: DISCONTINUED | OUTPATIENT
Start: 2018-06-13 | End: 2018-06-16 | Stop reason: HOSPADM

## 2018-06-13 RX ORDER — POTASSIUM CHLORIDE 20 MEQ/1
20 TABLET, EXTENDED RELEASE ORAL DAILY
Status: CANCELLED | OUTPATIENT
Start: 2018-06-13

## 2018-06-13 RX ORDER — PANTOPRAZOLE SODIUM 40 MG/1
40 TABLET, DELAYED RELEASE ORAL DAILY
Status: DISCONTINUED | OUTPATIENT
Start: 2018-06-13 | End: 2018-06-16 | Stop reason: HOSPADM

## 2018-06-13 RX ORDER — GABAPENTIN 300 MG/1
300 CAPSULE ORAL NIGHTLY
COMMUNITY
End: 2018-12-12

## 2018-06-13 RX ORDER — ATORVASTATIN CALCIUM 20 MG/1
20 TABLET, FILM COATED ORAL DAILY
Status: CANCELLED | OUTPATIENT
Start: 2018-06-13

## 2018-06-13 RX ORDER — NYSTATIN 100000 [USP'U]/G
1 POWDER TOPICAL 3 TIMES DAILY
Status: CANCELLED | OUTPATIENT
Start: 2018-06-13

## 2018-06-13 RX ORDER — TRAZODONE HYDROCHLORIDE 50 MG/1
50 TABLET ORAL NIGHTLY
COMMUNITY
End: 2018-12-12

## 2018-06-13 RX ORDER — GABAPENTIN 300 MG/1
300 CAPSULE ORAL NIGHTLY
Status: DISCONTINUED | OUTPATIENT
Start: 2018-06-13 | End: 2018-06-16 | Stop reason: HOSPADM

## 2018-06-13 RX ORDER — MAGNESIUM SULFATE HEPTAHYDRATE 40 MG/ML
2 INJECTION, SOLUTION INTRAVENOUS ONCE
Status: DISCONTINUED | OUTPATIENT
Start: 2018-06-13 | End: 2018-06-13 | Stop reason: SDUPTHER

## 2018-06-13 RX ADMIN — INSULIN ASPART 5 UNITS: 100 INJECTION, SOLUTION INTRAVENOUS; SUBCUTANEOUS at 08:54

## 2018-06-13 RX ADMIN — INSULIN ASPART 24 UNITS: 100 INJECTION, SOLUTION INTRAVENOUS; SUBCUTANEOUS at 20:00

## 2018-06-13 RX ADMIN — IPRATROPIUM BROMIDE AND ALBUTEROL SULFATE 3 ML: .5; 3 SOLUTION RESPIRATORY (INHALATION) at 13:19

## 2018-06-13 RX ADMIN — METHYLPREDNISOLONE SODIUM SUCCINATE 40 MG: 40 INJECTION, POWDER, FOR SOLUTION INTRAMUSCULAR; INTRAVENOUS at 06:07

## 2018-06-13 RX ADMIN — IPRATROPIUM BROMIDE AND ALBUTEROL SULFATE 3 ML: .5; 3 SOLUTION RESPIRATORY (INHALATION) at 19:56

## 2018-06-13 RX ADMIN — Medication 1 CAPSULE: at 14:20

## 2018-06-13 RX ADMIN — HEPARIN SODIUM 5000 UNITS: 5000 INJECTION, SOLUTION INTRAVENOUS; SUBCUTANEOUS at 20:25

## 2018-06-13 RX ADMIN — MAGNESIUM SULFATE HEPTAHYDRATE 1 G: 1 INJECTION, SOLUTION INTRAVENOUS at 21:34

## 2018-06-13 RX ADMIN — CEFTRIAXONE 1 G: 1 INJECTION, POWDER, FOR SOLUTION INTRAMUSCULAR; INTRAVENOUS at 17:21

## 2018-06-13 RX ADMIN — INSULIN ASPART 6 UNITS: 100 INJECTION, SOLUTION INTRAVENOUS; SUBCUTANEOUS at 11:33

## 2018-06-13 RX ADMIN — HEPARIN SODIUM 5000 UNITS: 5000 INJECTION, SOLUTION INTRAVENOUS; SUBCUTANEOUS at 08:54

## 2018-06-13 RX ADMIN — DOXYCYCLINE 100 MG: 100 INJECTION, POWDER, LYOPHILIZED, FOR SOLUTION INTRAVENOUS at 19:23

## 2018-06-13 RX ADMIN — INSULIN DETEMIR 10 UNITS: 100 INJECTION, SOLUTION SUBCUTANEOUS at 21:00

## 2018-06-13 RX ADMIN — NYSTATIN 1 APPLICATION: 100000 POWDER TOPICAL at 14:20

## 2018-06-13 RX ADMIN — IPRATROPIUM BROMIDE AND ALBUTEROL SULFATE 3 ML: .5; 3 SOLUTION RESPIRATORY (INHALATION) at 06:58

## 2018-06-13 RX ADMIN — METHYLPREDNISOLONE SODIUM SUCCINATE 40 MG: 40 INJECTION, POWDER, FOR SOLUTION INTRAMUSCULAR; INTRAVENOUS at 17:21

## 2018-06-13 RX ADMIN — PANTOPRAZOLE SODIUM 40 MG: 40 TABLET, DELAYED RELEASE ORAL at 14:20

## 2018-06-13 RX ADMIN — ATORVASTATIN CALCIUM 20 MG: 20 TABLET, FILM COATED ORAL at 14:20

## 2018-06-13 RX ADMIN — BUDESONIDE AND FORMOTEROL FUMARATE DIHYDRATE 2 PUFF: 80; 4.5 AEROSOL RESPIRATORY (INHALATION) at 15:07

## 2018-06-13 RX ADMIN — INSULIN ASPART 10 UNITS: 100 INJECTION, SOLUTION INTRAVENOUS; SUBCUTANEOUS at 20:00

## 2018-06-13 RX ADMIN — MAGNESIUM SULFATE HEPTAHYDRATE 1 G: 1 INJECTION, SOLUTION INTRAVENOUS at 19:24

## 2018-06-13 RX ADMIN — NYSTATIN 1 APPLICATION: 100000 POWDER TOPICAL at 21:00

## 2018-06-13 RX ADMIN — DOXYCYCLINE 100 MG: 100 INJECTION, POWDER, LYOPHILIZED, FOR SOLUTION INTRAVENOUS at 08:54

## 2018-06-13 RX ADMIN — GABAPENTIN 300 MG: 300 CAPSULE ORAL at 20:25

## 2018-06-13 RX ADMIN — INSULIN ASPART 14 UNITS: 100 INJECTION, SOLUTION INTRAVENOUS; SUBCUTANEOUS at 17:21

## 2018-06-13 RX ADMIN — SODIUM CHLORIDE 75 ML/HR: 9 INJECTION, SOLUTION INTRAVENOUS at 17:22

## 2018-06-13 NOTE — PLAN OF CARE
Problem: Fall Risk (Adult)  Goal: Identify Related Risk Factors and Signs and Symptoms  Outcome: Ongoing (interventions implemented as appropriate)      Problem: Pneumonia (Adult)  Goal: Signs and Symptoms of Listed Potential Problems Will be Absent, Minimized or Managed (Pneumonia)  Outcome: Ongoing (interventions implemented as appropriate)      Problem: Skin Injury Risk (Adult)  Goal: Identify Related Risk Factors and Signs and Symptoms  Outcome: Ongoing (interventions implemented as appropriate)      Problem: Diabetes, Type 2 (Adult)  Goal: Signs and Symptoms of Listed Potential Problems Will be Absent, Minimized or Managed (Diabetes, Type 2)  Outcome: Ongoing (interventions implemented as appropriate)

## 2018-06-13 NOTE — PROGRESS NOTES
Subjective     History:   Mayra Hays is a 72 y.o. female admitted on 6/12/2018 secondary to Pneumonia of left lower lobe due to infectious organism     Procedures: None    Patient seen and examined with JAMES Gresham. Awake and alert. Currently resting comfortably in bed. Reports improvement in her cough and dyspnea from overnight. Denies CP or palpitations. Denies nausea or vomiting. Hyperglycemic. No acute events overnight per RN.     History taken from: patient, chart, and RN.      Objective     Vital Signs  Temp:  [97.4 °F (36.3 °C)-99 °F (37.2 °C)] 97.4 °F (36.3 °C)  Heart Rate:  [] 71  Resp:  [18-40] 20  BP: ()/(43-78) 103/52    Intake/Output Summary (Last 24 hours) at 06/13/18 1749  Last data filed at 06/13/18 1221   Gross per 24 hour   Intake             1180 ml   Output              400 ml   Net              780 ml         Physical Exam:  General:    Awake, alert, in no acute distress   Heart:      Normal S1 and S2. Regular rate and rhythm. No significant murmur, rubs or gallops appreciated.   Lungs:     Respirations regular, even and unlabored. LLL rhonchi. No wheezes or rales.    Abdomen:   Soft and nontender. No guarding, rebound tenderness or  organomegaly noted. Bowel sounds present x 4.   Extremities:  Tr bilateral lower extremity edema. Moves UE and LE equally B/L.     Results Review:      Results from last 7 days  Lab Units 06/13/18  0209 06/12/18  1745   WBC 10*3/mm3 18.53* 18.80*   HEMOGLOBIN g/dL 11.7* 12.7   PLATELETS 10*3/mm3 178 192       Results from last 7 days  Lab Units 06/13/18  0209 06/12/18  1745   SODIUM mmol/L 137 138   POTASSIUM mmol/L 4.1 4.3   CHLORIDE mmol/L 103 102   CO2 mmol/L 23.1* 27.2   BUN mg/dL 30* 27*   CREATININE mg/dL 1.55* 1.32*   CALCIUM mg/dL 8.9 9.4   GLUCOSE mg/dL 309* 156*       Results from last 7 days  Lab Units 06/13/18  0209 06/12/18  1745   BILIRUBIN mg/dL 0.6 1.0   ALK PHOS U/L 76 89   AST (SGOT) U/L 16 18   ALT (SGPT) U/L 18 20       Results from  last 7 days  Lab Units 06/13/18  0801   MAGNESIUM mg/dL 1.9           Results from last 7 days  Lab Units 06/13/18  0801 06/13/18  0209 06/12/18  2038   CK TOTAL U/L 41 41 44   TROPONIN I ng/mL <0.006 0.006 0.007   CK MB INDEX % 1.2  --   --    MYOGLOBIN ng/mL 79.0 94.0 76.0       Imaging Results (last 24 hours)     Procedure Component Value Units Date/Time    XR Chest 1 View [[id number]] Collected:  06/13/18 0826     Updated:  06/13/18 0829    Narrative:       EXAMINATION:  XR CHEST 1 VW-      CLINICAL INDICATION:     Simple Sepsis triage protocol     TECHNIQUE:  XR CHEST 1 VW-       COMPARISON: 06/21/2017      FINDINGS:   Bibasilar airspace disease noted. Tiny pleural effusions.   Heart size is stable.   No pneumothorax.         Bony and soft tissue structures are unremarkable.            Impression:       As above.     This report was finalized on 6/13/2018 8:27 AM by Dr. Bill Parham MD.               Medications:    atorvastatin 20 mg Oral Daily   budesonide-formoterol 2 puff Inhalation BID - RT   ceftriaxone 1 g Intravenous Q24H   doxycycline 100 mg Intravenous Q12H   gabapentin 300 mg Oral Nightly   heparin (porcine) 5,000 Units Subcutaneous Q12H   insulin aspart 0-14 Units Subcutaneous 4x Daily AC & at Bedtime   insulin detemir 5 Units Subcutaneous Nightly   ipratropium-albuterol 3 mL Nebulization Q6H - RT   lactobacillus acidophilus 1 capsule Oral Daily   methylPREDNISolone sodium succinate 40 mg Intravenous Q12H   nystatin 1 application Topical TID   pantoprazole 40 mg Oral Daily       sodium chloride 75 mL/hr Last Rate: 75 mL/hr (06/13/18 1722)           Assessment/Plan   Sepsis: Likely 2/2 bibasilar pneumonia. Remains afebrile and hemodynamically stable. Tachypnea and tachycardia have improved. WBC and CRP are elevated. Currently on Rocephin and Doxycycline. Cont to follow cultures and repeat labs in the AM.     Bibasilar pneumonia, community acquired: Atypical workup is negative. Cont antibiotics as  above. Repeat labs in the AM.     Acute exacerbation of COPD: Cont antibiotics as above, steroids, Symbicort and scheduled nebs.     Acute hypoxic respiratory failure: Likely 2/2 above. During my encounter today, pt had removed her supplemental O2 and was maintaining O2 in the upper 90's. Cont treatment as outlined above.     DONNIE: Holding any potential nephrotoxic agents. Cont gentle IVF's. Repeat labs in the AM.     DM II, non-insulin dependent with hyperglycemia: HgA1c is 6.5. Steroids likely contributing to hyperglycemia. Holding metformin. Increase sliding scale and add low dose basal insulin.     Chronic systolic CHF: EF 41-45% in 9/17. Appears compensated at present. Holding Lasix. Cont to monitor I/O's.     DVT PPX: SQ heparin    Pt is at high risk 2/2 sepsis, pneumonia, COPD exacerbation, resp failure, DONNIE, CHF and DM with hyperglycemia.         Madhu Hoffmann,   06/13/18  5:49 PM

## 2018-06-13 NOTE — PLAN OF CARE
Problem: Patient Care Overview  Goal: Discharge Needs Assessment  Outcome: Ongoing (interventions implemented as appropriate)  Discharge Planning Assessment   Pasquale     Patient Name: Mayra Hays  MRN: [id number]  Today's Date: 6/13/2018    Admit Date: 6/12/2018          Discharge Needs Assessment     Row Name 06/13/18 1204       Living Environment    Lives With alone   Pt lives at home alone, however stays with daughterDavy at times.     Primary Care Provided by self    Provides Primary Care For no one    Family Caregiver if Needed child(eligio), adult    Quality of Family Relationships supportive   Pt has good family support.    Able to Return to Prior Arrangements yes       Transition Planning    Transportation Anticipated family or friend will provide   Hailey Rasheed to provide transportation at discharge.        Discharge Needs Assessment    Equipment Currently Used at Home oxygen;nebulizer;cane, quad;walker, rolling;glucometer   Pt has home oxygen @ 2 liters from [facility], rolling walker and glucometer from unknown provider, nebulizer machine (has medicine) and quad cane from [facility].     Outpatient/Agency/Support Group Needs --   Pt does not utilize home health services and she denies need for services at this time.             Discharge Plan     Row Name 06/13/18 1208       Plan    Plan Pt lives at home alone, however she stays with her daughterDavy at times. Pt plans to return home at discharge. Pt does not utilize home health services and denies need for services at this time. Pt has home oxygen @ 2 liters PRN, nebulizer machine, glucometer, quad cane, and rolling walker. Pt does not have a POA or living will. PCP is NP, Priscila Eugene and pt also see's Dr. Matias, Dr. Peter and an Endocrinologist in [place]. Pt utilizes [facility] and does not have issues with copays. SS to follow and assist as needed with discharge planning.     Patient/Family in Agreement with Plan  yes          Demographic Summary     Row Name 06/13/18 7169       General Information    Referral Source nursing    Reason for Consult --   SS received consult per protocol. SS spoke to pt and daughter, Hailey Prince at [phone].      Unique Joyner

## 2018-06-13 NOTE — ED PROVIDER NOTES
Subjective   Patient is a 72-year-old white female who presents today with a 3 day history of cough productive of thick yellow white sputum, chest congestion, shortness of breath.  She complains of generalized weakness and fatigue.  The triage nurse had gotten a history of dizziness, however the patient denies dizziness to me.  She denies fever, chills, chest pain, abdominal pain, vomiting, diarrhea, hemoptysis, hematemesis, hematochezia or melena, syncope or near syncope, focal numbness or weakness, other symptoms or other complaints.            Review of Systems   Constitutional: Negative for chills, diaphoresis and fever.   HENT: Negative for ear pain, sore throat and trouble swallowing.    Eyes: Negative for photophobia and pain.   Respiratory: Positive for cough, shortness of breath and wheezing.    Cardiovascular: Negative for chest pain and palpitations.   Gastrointestinal: Negative for abdominal distention, abdominal pain, blood in stool, diarrhea, nausea and vomiting.   Endocrine: Negative for polydipsia and polyphagia.   Genitourinary: Negative for difficulty urinating and flank pain.   Musculoskeletal: Negative for back pain, neck pain and neck stiffness.   Skin: Negative for color change and pallor.   Neurological: Negative for seizures, syncope and speech difficulty.   Psychiatric/Behavioral: Negative for confusion.   All other systems reviewed and are negative.      Past Medical History:   Diagnosis Date   • Collapsed lung    • COPD (chronic obstructive pulmonary disease)    • GERD (gastroesophageal reflux disease)    • Hyperlipidemia    • Hypertension    • Myocardial infarction        No Known Allergies    Past Surgical History:   Procedure Laterality Date   • CARDIAC CATHETERIZATION N/A 8/22/2017    Procedure: Left Heart Cath;  Surgeon: Jatinder Matias MD;  Location: [facility] INVASIVE LOCATION;  Service:        History reviewed. No pertinent family history.    Social History     Social History   •  Marital status: Other     Social History Main Topics   • Smoking status: Former Smoker   • Alcohol use No   • Drug use: Unknown     Other Topics Concern   • Not on file           Objective   Physical Exam   Constitutional: She is oriented to person, place, and time. She appears well-developed and well-nourished.   HENT:   Head: Normocephalic and atraumatic.   Eyes: EOM are normal. Pupils are equal, round, and reactive to light. No scleral icterus.   Neck: Normal range of motion. Neck supple. No neck rigidity. No tracheal deviation present.   Cardiovascular: Regular rhythm and intact distal pulses.    Pulmonary/Chest: She has wheezes (Few scattered wheezes). She exhibits no tenderness.   Much crackles in left base; respirations are mildly labored   Abdominal: Soft. Bowel sounds are normal. There is no tenderness. There is no rebound and no guarding.   Musculoskeletal: Normal range of motion. She exhibits no tenderness.   Neurological: She is alert and oriented to person, place, and time. She has normal strength. No sensory deficit. She exhibits normal muscle tone. Coordination normal. GCS eye subscore is 4. GCS verbal subscore is 5. GCS motor subscore is 6.   Skin: Skin is warm and dry. Capillary refill takes less than 2 seconds. She is not diaphoretic. No cyanosis. No pallor.   Psychiatric: She has a normal mood and affect. Her behavior is normal.   Nursing note and vitals reviewed.      Procedures  Results for orders placed or performed during the hospital encounter of 06/12/18   Comprehensive Metabolic Panel   Result Value Ref Range    Glucose 156 (H) 70 - 110 mg/dL    BUN 27 (H) 7 - 21 mg/dL    Creatinine 1.32 (H) 0.43 - 1.29 mg/dL    Sodium 138 135 - 153 mmol/L    Potassium 4.3 3.5 - 5.3 mmol/L    Chloride 102 99 - 112 mmol/L    CO2 27.2 24.3 - 31.9 mmol/L    Calcium 9.4 7.7 - 10.0 mg/dL    Total Protein 7.9 6.0 - 8.0 g/dL    Albumin 4.50 3.40 - 4.80 g/dL    ALT (SGPT) 20 10 - 36 U/L    AST (SGOT) 18 10 - 30 U/L     Alkaline Phosphatase 89 35 - 104 U/L    Total Bilirubin 1.0 0.2 - 1.8 mg/dL    eGFR Non African Amer 40 (L) >60 mL/min/1.73    Globulin 3.4 gm/dL    A/G Ratio 1.3 (L) 1.5 - 2.5 g/dL    BUN/Creatinine Ratio 20.5 7.0 - 25.0    Anion Gap 8.8 3.6 - 11.2 mmol/L   Lactic Acid, Plasma   Result Value Ref Range    Lactate 1.6 0.5 - 2.0 mmol/L   Blood Gas, Arterial   Result Value Ref Range    Site Arterial: right radial     Gonzalo's Test Positive     pH, Arterial 7.462 (H) 7.350 - 7.450 pH units    pCO2, Arterial 37.8 35.0 - 45.0 mm Hg    pO2, Arterial 49.8 (C) 80.0 - 100.0 mm Hg    HCO3, Arterial 26.4 (H) 22.0 - 26.0 mmol/L    Base Excess, Arterial 2.6 mmol/L    O2 Saturation, Arterial 87.2 (L) 90.0 - 100.0 %    Hemoglobin, Blood Gas 13.1 12 - 16 g/dL    Hematocrit, Blood Gas 39.0 37.0 - 47.0 %    Oxyhemoglobin 85.5 85 - 100 %    Methemoglobin 0.50 0.00 - 3.00 %    Carboxyhemoglobin 1.5 0 - 5 %    A-a Gradiant 47.9 0.0 - 300.0 mmHg    Temperature 98.6 C    Barometric Pressure for Blood Gas 728 mmHg    Modality Room Air     FIO2 21 %   CBC Auto Differential   Result Value Ref Range    WBC 18.80 (H) 4.50 - 12.50 10*3/mm3    RBC 4.56 4.20 - 5.40 10*6/mm3    Hemoglobin 12.7 12.0 - 16.0 g/dL    Hematocrit 39.8 37.0 - 47.0 %    MCV 87.3 80.0 - 94.0 fL    MCH 27.9 27.0 - 33.0 pg    MCHC 31.9 (L) 33.0 - 37.0 g/dL    RDW 13.4 11.5 - 14.5 %    RDW-SD 41.9 37.0 - 54.0 fl    MPV 11.8 (H) 6.0 - 10.0 fL    Platelets 192 130 - 400 10*3/mm3    Neutrophil % 82.8 (H) 40.0 - 75.0 %    Lymphocyte % 7.2 (L) 16.0 - 46.0 %    Monocyte % 9.2 0.0 - 12.0 %    Eosinophil % 0.1 0.0 - 7.0 %    Basophil % 0.2 0.0 - 2.0 %    Immature Grans % 0.5 0.0 - 0.5 %    Neutrophils, Absolute 15.56 (H) 1.40 - 6.50 10*3/mm3    Lymphocytes, Absolute 1.36 1.00 - 3.00 10*3/mm3    Monocytes, Absolute 1.73 (H) 0.10 - 0.90 10*3/mm3    Eosinophils, Absolute 0.02 0.00 - 0.70 10*3/mm3    Basophils, Absolute 0.04 0.00 - 0.30 10*3/mm3    Immature Grans, Absolute 0.09 (H) 0.00  - 0.03 10*3/mm3   Troponin   Result Value Ref Range    Troponin I 0.006 <=0.040 ng/mL   Osmolality, Calculated   Result Value Ref Range    Osmolality Calc 284.0 273.0 - 305.0 mOsm/kg   Mycoplasma Pneumoniae Antibody, IgM   Result Value Ref Range    Mycoplasma pneumo IgM Negative Negative   BNP   Result Value Ref Range    BNP 50.0 0.0 - 100.0 pg/mL   C-reactive Protein   Result Value Ref Range    C-Reactive Protein 25.66 (H) 0.00 - 0.99 mg/dL   Troponin   Result Value Ref Range    Troponin I 0.007 <=0.040 ng/mL   CK   Result Value Ref Range    Creatine Kinase 44 24 - 173 U/L   CK-MB   Result Value Ref Range    CKMB <0.18 0.00 - 5.00 ng/mL   Myoglobin, Serum   Result Value Ref Range    Myoglobin 76.0 0.0 - 109.0 ng/mL   Hemoglobin A1c   Result Value Ref Range    Hemoglobin A1C 6.50 (H) 4.50 - 5.70 %   CK-MB Index   Result Value Ref Range    CK-MB Index  0.0 - 3.0 %   POC Glucose Once   Result Value Ref Range    Glucose 213 (H) 70 - 130 mg/dL   Light Blue Top   Result Value Ref Range    Extra Tube hold for add-on    Green Top (Gel)   Result Value Ref Range    Extra Tube Hold for add-ons.    Lavender Top   Result Value Ref Range    Extra Tube hold for add-on    Gold Top - SST   Result Value Ref Range    Extra Tube Hold for add-ons.                 ED Course  ED Course as of Jun 12 2205   Tue Jun 12, 2018 1955 Patient's emergency department stay has not been complicated.  Her dyspnea seems to be improving.  I discussed case with Dr. Prabhakar, and he is admitting the patient to the MedSurg unit under his service for further care.  [CM]      ED Course User Index  [CM] Gab Kennedy MD                  [facility]      Final diagnoses:   Pneumonia of left lower lobe due to infectious organism             Please note that portions of this note were completed with a voice recognition program. Efforts were made to edit the dictations, but occasionally words are mistranscribed.       Gab Kennedy MD  06/12/18 4571

## 2018-06-13 NOTE — PROGRESS NOTES
Discharge Planning Assessment   [place]     Patient Name: Mayra Hays  MRN: [id number]  Today's Date: 6/13/2018    Admit Date: 6/12/2018          Discharge Needs Assessment     Row Name 06/13/18 1208       Living Environment    Lives With alone   Pt lives at home alone, however stays with daughterDavy at times.     Primary Care Provided by self    Provides Primary Care For no one    Family Caregiver if Needed child(eligio), adult    Quality of Family Relationships supportive   Pt has good family support.    Able to Return to Prior Arrangements yes       Transition Planning    Transportation Anticipated family or friend will provide   DaughterHailey to provide transportation at discharge.        Discharge Needs Assessment    Equipment Currently Used at Home oxygen;nebulizer;cane, quad;walker, rolling;glucometer   Pt has home oxygen @ 2 liters from [facility], rolling walker and glucometer from unknown provider, nebulizer machine (has medicine) and quad cane from [facility].     Outpatient/Agency/Support Group Needs --   Pt does not utilize home health services and she denies need for services at this time.             Discharge Plan     Row Name 06/13/18 1205       Plan    Plan Pt lives at home alone, however she stays with her daughter, Davy at times. Pt plans to return home at discharge. Pt does not utilize home health services and denies need for services at this time. Pt has home oxygen @ 2 liters PRN, nebulizer machine, glucometer, quad cane, and rolling walker. Pt does not have a POA or living will. PCP is NP, Priscila Eugene and pt also see's Dr. Matias, Dr. Peter and an Endocrinologist in [place]. Pt utilizes [facility] and does not have issues with copays. SS to follow and assist as needed with discharge planning.     Patient/Family in Agreement with Plan yes                     Demographic Summary     Row Name 06/13/18 1206       General Information    Referral Source nursing     Reason for Consult --   SS received consult per protocol. SS spoke to pt and daughter, Hailey Prince at [phone].      Unique Joyner

## 2018-06-13 NOTE — H&P
Hospitalist History and Physical        Patient Identification  Name: Mayra Hays  Age/Sex: 72 y.o. female  :  1945        MRN: [id number]  Visit Number: [id number]  PCP: ERASTO Tomlinson        Chief complaint cough, chest congestion    History of Present Illness:  Patient is a 72 y.o. female who presents with complaints of chest congestion and cough of 3 days duration. She also complains of pain in the lower part of her rib cage on the left side, but this was only last night and has since resolved. She denies shortness of breath, but does admit that she has been wheezing more. She has been using her home nebulizer machine more in recent days, but her symptoms have continued to worsen. In the ED, she was found to be tachycardic and hypoxic on room air per ABG. She reports she has supplemental oxygen at home but only uses it as needed which is very rarely. Lab work showed leukocytosis with WBC 18 K and CRP is significantly elevated at 25. She also has some DONNIE with creatinine of 1.32 (baseline 0.96); BUN:cr ratio is 20.5 suggesting dehydration. BNP is normal as is first troponin. Of note, she has a history of systolic dysfunction with EF of 41-45% per ECHO from 2017. Chest XR was felt to show a right lower lobe infiltrate and possible left lower lobe infiltrate though it is difficult to appreciate because of cardiac silhouette. She has been admitted for further treatment of sepsis and acute hypoxic respiratory failure secondary to pneumonia along with COPD exacerbation.    Review of Systems  Review of Systems   Constitutional: Positive for appetite change and chills. Negative for activity change, fatigue and fever.   HENT: Negative for congestion, postnasal drip, rhinorrhea, sinus pain, sinus pressure and sore throat.    Eyes: Negative for photophobia, pain, discharge, redness, itching and visual disturbance.   Respiratory: Positive for cough and wheezing. Negative for choking and shortness of  breath.    Cardiovascular: Positive for chest pain (left lateral lower chest wall, last night, has since resolved). Negative for palpitations and leg swelling.   Gastrointestinal: Negative for abdominal distention, abdominal pain, constipation, diarrhea, nausea and vomiting.   Endocrine: Negative for cold intolerance, heat intolerance, polydipsia, polyphagia and polyuria.   Genitourinary: Negative for difficulty urinating, dysuria, frequency, hematuria and pelvic pain.   Musculoskeletal: Negative for arthralgias, back pain, joint swelling, myalgias, neck pain and neck stiffness.   Skin: Negative for color change, pallor, rash and wound.   Allergic/Immunologic: Negative for environmental allergies, food allergies and immunocompromised state.   Neurological: Negative for dizziness, tremors, seizures, syncope, weakness, light-headedness, numbness and headaches.   Hematological: Negative for adenopathy. Does not bruise/bleed easily.   Psychiatric/Behavioral: Negative for agitation, behavioral problems and confusion.       History  Past Medical History:   Diagnosis Date   • Collapsed lung    • COPD (chronic obstructive pulmonary disease)    • GERD (gastroesophageal reflux disease)    • Hyperlipidemia    • Hypertension    • Myocardial infarction      Past Surgical History:   Procedure Laterality Date   • CARDIAC CATHETERIZATION N/A 8/22/2017    Procedure: Left Heart Cath;  Surgeon: Jatinder Matias MD;  Location: [facility] CATH INVASIVE LOCATION;  Service:      History reviewed. No pertinent family history.  Social History   Substance Use Topics   • Smoking status: Former Smoker   • Smokeless tobacco: Not on file   • Alcohol use No     Prescriptions Prior to Admission   Medication Sig Dispense Refill Last Dose   • albuterol (PROAIR HFA) 108 (90 Base) MCG/ACT inhaler Inhale 2 puffs Every 4 (Four) Hours As Needed for Shortness of Air. 1 inhaler 11    • albuterol (PROVENTIL) (2.5 MG/3ML) 0.083% nebulizer solution Take 2.5 mg by  nebulization Every 4 (Four) Hours As Needed for Wheezing.   Taking   • atenolol (TENORMIN) 25 MG tablet Take 1 tablet by mouth Daily. 30 tablet 4 Taking   • atorvastatin (LIPITOR) 20 MG tablet Take 20 mg by mouth Daily.   Taking   • esomeprazole (nexIUM) 20 MG capsule Take 20 mg by mouth Every Morning Before Breakfast.   Taking   • ferrous sulfate 325 (65 FE) MG tablet Take 325 mg by mouth 2 (Two) Times a Day.   Taking   • furosemide (LASIX) 20 MG tablet Take 20 mg by mouth Daily.   Taking   • gabapentin (NEURONTIN) 300 MG capsule Take 300 mg by mouth 2 (Two) Times a Day.   Taking   • ipratropium-albuterol (DUO-NEB) 0.5-2.5 mg/mL nebulizer Take 3 mL by nebulization Every 4 (Four) Hours As Needed for Wheezing. 120 vial 11 Taking   • ipratropium-albuterol (DUO-NEB) 0.5-2.5 mg/mL nebulizer Take 3 mL by nebulization Every 4 (Four) Hours As Needed for Wheezing. 120 vial 11    • lisinopril (PRINIVIL,ZESTRIL) 5 MG tablet Take 5 mg by mouth Daily.   Taking   • magnesium oxide (MAG-OX) 400 MG tablet Take 400 mg by mouth 2 (Two) Times a Day.   Taking   • metFORMIN (GLUCOPHAGE) 500 MG tablet Take 500 mg by mouth 2 (Two) Times a Day With Meals.   Taking   • methIMAzole (TAPAZOLE) 5 MG tablet Take 5 mg by mouth 3 (Three) Times a Day.   Taking   • pantoprazole (PROTONIX) 40 MG EC tablet Take 40 mg by mouth Daily.   Taking   • potassium chloride (K-DUR,KLOR-CON) 20 MEQ CR tablet Take 20 mEq by mouth 2 (Two) Times a Day.   Taking   • predniSONE (DELTASONE) 10 MG tablet Take 0.5 tablets by mouth Daily. 5 tablet 0 Taking     Allergies:  Patient has no known allergies.    Objective     Vital Signs  Temp:  [98.7 °F (37.1 °C)-99 °F (37.2 °C)] 99 °F (37.2 °C)  Heart Rate:  [] 105  Resp:  [22-40] 36  BP: (100-130)/(43-78) 113/43  Body mass index is 34.52 kg/m².    Physical Exam:  Physical Exam   Constitutional: She is oriented to person, place, and time.   Elderly female, no acute distress   HENT:   Head: Atraumatic.   Mouth/Throat:  Oropharynx is clear and moist.   Eyes: Conjunctivae and EOM are normal. Pupils are equal, round, and reactive to light.   Neck: Normal range of motion. Neck supple. No JVD present.   Cardiovascular: Regular rhythm, normal heart sounds and intact distal pulses.    No murmur heard.  Borderline tachycardic   Pulmonary/Chest:   Normal respiratory effort at this time. Prolonged expiratory phase with faint expiratory wheezing. Rhonchi/rales noted left lower lung zone.   Abdominal: Soft. Bowel sounds are normal. She exhibits no distension. There is no tenderness.   Musculoskeletal: Normal range of motion. She exhibits edema (Trace to 1+, chronic and actually better than baseline per patient). She exhibits no tenderness or deformity.   Lymphadenopathy:     She has no cervical adenopathy.   Neurological: She is alert and oriented to person, place, and time.   No gross focal deficits appreciated on exam   Skin: Skin is warm and dry. Capillary refill takes less than 2 seconds. No rash noted. No erythema.   Psychiatric: She has a normal mood and affect. Her behavior is normal. Judgment and thought content normal.         Results Review:       Lab Results:    Results from last 7 days  Lab Units 06/12/18  1745   WBC 10*3/mm3 18.80*   HEMOGLOBIN g/dL 12.7   PLATELETS 10*3/mm3 192       Results from last 7 days  Lab Units 06/12/18  1745   CRP mg/dL 25.66*       Results from last 7 days  Lab Units 06/12/18  1745   SODIUM mmol/L 138   POTASSIUM mmol/L 4.3   CHLORIDE mmol/L 102   CO2 mmol/L 27.2   BUN mg/dL 27*   CREATININE mg/dL 1.32*   CALCIUM mg/dL 9.4   GLUCOSE mg/dL 156*         No results found for: HGBA1C    Results from last 7 days  Lab Units 06/12/18  1745   BILIRUBIN mg/dL 1.0   ALK PHOS U/L 89   AST (SGOT) U/L 18   ALT (SGPT) U/L 20       Results from last 7 days  Lab Units 06/12/18  1745   TROPONIN I ng/mL 0.006       Results from last 7 days  Lab Units 06/12/18  1745   BNP pg/mL 50.0           Results from last 7  days  Lab Units 06/12/18  1805   PH, ARTERIAL pH units 7.462*   PO2 ART mm Hg 49.8*   PCO2, ARTERIAL mm Hg 37.8   HCO3 ART mmol/L 26.4*       I have reviewed the patient's laboratory results.    Imaging:  Imaging Results (last 72 hours)     Procedure Component Value Units Date/Time    XR Chest 1 View [[id number]] Updated:  06/12/18 1827      Chest XR: Possible left lower lobe infiltrate ready by ED provider though skewed by cardiac silhouette. Also appears to have possible right lower lobe infiltrate though could be chronic scarring/atelectasis.    I have personally reviewed the patient's radiologic imaging.        EKG: Sinus tachycardia, . PVCs noted. Appears to have evidence of old anteroseptal infarct. Electronic interpretation mentions inverted T waves in lateral leads but on my review, T waves are upward in lateral leads except for AVL.    I have personally reviewed the patient's EKG.        Assessment/Plan     Active Problems:  - Sepsis secondary to left lower lobe, possibly bilateral, community acquired pneumonia: treat with IV rocephin and doxycycline. Rule out atypical organisms. Continue to trend WBC, CRP.  - COPD exacerbation: in addition to above antibiotics, treat with IV solu-medrol and nebs.  - Acute hypoxic respiratory failure secondary to above: continue supplemental O2, titrate to keep O2 sat in low 90% range.  - DONNIE: gently hydrate with IV fluids, keeping in mind patient has decreased LV function per recent ECHO. Currently appears clinically compensated from CHF standpoint and BNP is normal. Monitor closely for signs/symptoms of fluid overload. Repeat labs in the morning.  - Type II DM, non-insulin dependent: cover with SSI for now, may need further adjustment since receiving steroids that will likely raise blood glucose levels.  - Chronic systolic CHF with EF 41-45%: review home meds once reconciled by pharmacy. Will hold lasix for now, however, in light of DONNIE noted above. Again, monitor  closely for signs/symptoms of developing fluid overload.    DVT Prophylaxis: SQ heparin    Estimated Length of Stay >2 midnights    I discussed the patient's findings, assessment and plan with the patient and her RN Janine on [facility].    * Patient is high risk due to sepsis, pneumonia, COPD exacerbation, acute hypoxic respiratory failure, DONNIE, Type II DM, systolic dysfunction    Serafin Prabhakar MD  06/12/18  9:15 PM

## 2018-06-13 NOTE — NURSING NOTE
Pt blood glucose rechecked per Leni TOMLIN request and it was 482. I repeated the blood stick and it read to be 505. Leni Prabhakar paged.

## 2018-06-13 NOTE — PLAN OF CARE
Problem: Fall Risk (Adult)  Goal: Identify Related Risk Factors and Signs and Symptoms  Outcome: Ongoing (interventions implemented as appropriate)   06/12/18 8733   Fall Risk (Adult)   Related Risk Factors (Fall Risk) environment unfamiliar   Signs and Symptoms (Fall Risk) presence of risk factors     Goal: Absence of Fall  Outcome: Ongoing (interventions implemented as appropriate)      Problem: Pneumonia (Adult)  Goal: Signs and Symptoms of Listed Potential Problems Will be Absent, Minimized or Managed (Pneumonia)  Outcome: Ongoing (interventions implemented as appropriate)      Problem: Skin Injury Risk (Adult)  Goal: Identify Related Risk Factors and Signs and Symptoms  Outcome: Ongoing (interventions implemented as appropriate)    Goal: Skin Health and Integrity  Outcome: Ongoing (interventions implemented as appropriate)

## 2018-06-14 LAB
ALBUMIN SERPL-MCNC: 3.7 G/DL (ref 3.4–4.8)
ALBUMIN/GLOB SERPL: 1.2 G/DL (ref 1.5–2.5)
ALP SERPL-CCNC: 78 U/L (ref 35–104)
ALT SERPL W P-5'-P-CCNC: 15 U/L (ref 10–36)
ANION GAP SERPL CALCULATED.3IONS-SCNC: 6.5 MMOL/L (ref 3.6–11.2)
AST SERPL-CCNC: 15 U/L (ref 10–30)
BASOPHILS # BLD AUTO: 0.02 10*3/MM3 (ref 0–0.3)
BASOPHILS NFR BLD AUTO: 0.1 % (ref 0–2)
BILIRUB SERPL-MCNC: 0.2 MG/DL (ref 0.2–1.8)
BUN BLD-MCNC: 39 MG/DL (ref 7–21)
BUN/CREAT SERPL: 29.8 (ref 7–25)
CALCIUM SPEC-SCNC: 8.8 MG/DL (ref 7.7–10)
CHLORIDE SERPL-SCNC: 107 MMOL/L (ref 99–112)
CO2 SERPL-SCNC: 21.5 MMOL/L (ref 24.3–31.9)
CREAT BLD-MCNC: 1.31 MG/DL (ref 0.43–1.29)
CRP SERPL-MCNC: 20.68 MG/DL (ref 0–0.99)
DEPRECATED RDW RBC AUTO: 43.9 FL (ref 37–54)
EOSINOPHIL # BLD AUTO: 0 10*3/MM3 (ref 0–0.7)
EOSINOPHIL NFR BLD AUTO: 0 % (ref 0–7)
ERYTHROCYTE [DISTWIDTH] IN BLOOD BY AUTOMATED COUNT: 13.8 % (ref 11.5–14.5)
GFR SERPL CREATININE-BSD FRML MDRD: 40 ML/MIN/1.73
GLOBULIN UR ELPH-MCNC: 3 GM/DL
GLUCOSE BLD-MCNC: 355 MG/DL (ref 70–110)
GLUCOSE BLDC GLUCOMTR-MCNC: 312 MG/DL (ref 70–130)
GLUCOSE BLDC GLUCOMTR-MCNC: 360 MG/DL (ref 70–130)
GLUCOSE BLDC GLUCOMTR-MCNC: 367 MG/DL (ref 70–130)
GLUCOSE BLDC GLUCOMTR-MCNC: 394 MG/DL (ref 70–130)
GLUCOSE BLDC GLUCOMTR-MCNC: 401 MG/DL (ref 70–130)
HCT VFR BLD AUTO: 37.6 % (ref 37–47)
HGB BLD-MCNC: 12 G/DL (ref 12–16)
IMM GRANULOCYTES # BLD: 0.15 10*3/MM3 (ref 0–0.03)
IMM GRANULOCYTES NFR BLD: 0.8 % (ref 0–0.5)
LYMPHOCYTES # BLD AUTO: 0.93 10*3/MM3 (ref 1–3)
LYMPHOCYTES NFR BLD AUTO: 4.8 % (ref 16–46)
MAGNESIUM SERPL-MCNC: 2.8 MG/DL (ref 1.7–2.6)
MCH RBC QN AUTO: 28.1 PG (ref 27–33)
MCHC RBC AUTO-ENTMCNC: 31.9 G/DL (ref 33–37)
MCV RBC AUTO: 88.1 FL (ref 80–94)
MONOCYTES # BLD AUTO: 1.23 10*3/MM3 (ref 0.1–0.9)
MONOCYTES NFR BLD AUTO: 6.4 % (ref 0–12)
NEUTROPHILS # BLD AUTO: 16.89 10*3/MM3 (ref 1.4–6.5)
NEUTROPHILS NFR BLD AUTO: 87.9 % (ref 40–75)
NRBC BLD MANUAL-RTO: 0 /100 WBC (ref 0–0)
OSMOLALITY SERPL CALC.SUM OF ELEC: 293.8 MOSM/KG (ref 273–305)
PLATELET # BLD AUTO: 197 10*3/MM3 (ref 130–400)
PMV BLD AUTO: 12.3 FL (ref 6–10)
POTASSIUM BLD-SCNC: 4.2 MMOL/L (ref 3.5–5.3)
PROT SERPL-MCNC: 6.7 G/DL (ref 6–8)
RBC # BLD AUTO: 4.27 10*6/MM3 (ref 4.2–5.4)
SODIUM BLD-SCNC: 135 MMOL/L (ref 135–153)
WBC NRBC COR # BLD: 19.22 10*3/MM3 (ref 4.5–12.5)

## 2018-06-14 PROCEDURE — 63710000001 INSULIN ASPART PER 5 UNITS: Performed by: PHYSICIAN ASSISTANT

## 2018-06-14 PROCEDURE — 83735 ASSAY OF MAGNESIUM: CPT | Performed by: INTERNAL MEDICINE

## 2018-06-14 PROCEDURE — 94799 UNLISTED PULMONARY SVC/PX: CPT

## 2018-06-14 PROCEDURE — 25010000002 METHYLPREDNISOLONE PER 40 MG: Performed by: HOSPITALIST

## 2018-06-14 PROCEDURE — 99233 SBSQ HOSP IP/OBS HIGH 50: CPT | Performed by: INTERNAL MEDICINE

## 2018-06-14 PROCEDURE — 25010000002 METHYLPREDNISOLONE PER 40 MG: Performed by: INTERNAL MEDICINE

## 2018-06-14 PROCEDURE — 82962 GLUCOSE BLOOD TEST: CPT

## 2018-06-14 PROCEDURE — 80053 COMPREHEN METABOLIC PANEL: CPT | Performed by: INTERNAL MEDICINE

## 2018-06-14 PROCEDURE — 25010000002 HEPARIN (PORCINE) PER 1000 UNITS: Performed by: HOSPITALIST

## 2018-06-14 PROCEDURE — 85025 COMPLETE CBC W/AUTO DIFF WBC: CPT | Performed by: INTERNAL MEDICINE

## 2018-06-14 PROCEDURE — 25010000002 CEFTRIAXONE: Performed by: HOSPITALIST

## 2018-06-14 PROCEDURE — 86140 C-REACTIVE PROTEIN: CPT | Performed by: INTERNAL MEDICINE

## 2018-06-14 PROCEDURE — 63710000001 INSULIN DETEMIR PER 5 UNITS: Performed by: INTERNAL MEDICINE

## 2018-06-14 RX ORDER — METHYLPREDNISOLONE SODIUM SUCCINATE 40 MG/ML
20 INJECTION, POWDER, LYOPHILIZED, FOR SOLUTION INTRAMUSCULAR; INTRAVENOUS EVERY 12 HOURS
Status: DISCONTINUED | OUTPATIENT
Start: 2018-06-14 | End: 2018-06-15

## 2018-06-14 RX ADMIN — DOXYCYCLINE 100 MG: 100 INJECTION, POWDER, LYOPHILIZED, FOR SOLUTION INTRAVENOUS at 08:32

## 2018-06-14 RX ADMIN — NYSTATIN 1 APPLICATION: 100000 POWDER TOPICAL at 20:22

## 2018-06-14 RX ADMIN — METHYLPREDNISOLONE SODIUM SUCCINATE 40 MG: 40 INJECTION, POWDER, FOR SOLUTION INTRAMUSCULAR; INTRAVENOUS at 05:18

## 2018-06-14 RX ADMIN — INSULIN ASPART 20 UNITS: 100 INJECTION, SOLUTION INTRAVENOUS; SUBCUTANEOUS at 20:21

## 2018-06-14 RX ADMIN — NYSTATIN 1 APPLICATION: 100000 POWDER TOPICAL at 09:34

## 2018-06-14 RX ADMIN — NYSTATIN 1 APPLICATION: 100000 POWDER TOPICAL at 16:39

## 2018-06-14 RX ADMIN — IPRATROPIUM BROMIDE AND ALBUTEROL SULFATE 3 ML: .5; 3 SOLUTION RESPIRATORY (INHALATION) at 19:13

## 2018-06-14 RX ADMIN — SODIUM CHLORIDE 75 ML/HR: 9 INJECTION, SOLUTION INTRAVENOUS at 05:24

## 2018-06-14 RX ADMIN — ATORVASTATIN CALCIUM 20 MG: 20 TABLET, FILM COATED ORAL at 09:30

## 2018-06-14 RX ADMIN — INSULIN DETEMIR 15 UNITS: 100 INJECTION, SOLUTION SUBCUTANEOUS at 21:18

## 2018-06-14 RX ADMIN — METHYLPREDNISOLONE SODIUM SUCCINATE 20 MG: 40 INJECTION, POWDER, FOR SOLUTION INTRAMUSCULAR; INTRAVENOUS at 18:00

## 2018-06-14 RX ADMIN — HEPARIN SODIUM 5000 UNITS: 5000 INJECTION, SOLUTION INTRAVENOUS; SUBCUTANEOUS at 20:22

## 2018-06-14 RX ADMIN — IPRATROPIUM BROMIDE AND ALBUTEROL SULFATE 3 ML: .5; 3 SOLUTION RESPIRATORY (INHALATION) at 06:54

## 2018-06-14 RX ADMIN — PANTOPRAZOLE SODIUM 40 MG: 40 TABLET, DELAYED RELEASE ORAL at 09:30

## 2018-06-14 RX ADMIN — INSULIN ASPART 16 UNITS: 100 INJECTION, SOLUTION INTRAVENOUS; SUBCUTANEOUS at 08:30

## 2018-06-14 RX ADMIN — HEPARIN SODIUM 5000 UNITS: 5000 INJECTION, SOLUTION INTRAVENOUS; SUBCUTANEOUS at 09:31

## 2018-06-14 RX ADMIN — GABAPENTIN 300 MG: 300 CAPSULE ORAL at 20:22

## 2018-06-14 RX ADMIN — BUDESONIDE AND FORMOTEROL FUMARATE DIHYDRATE 2 PUFF: 80; 4.5 AEROSOL RESPIRATORY (INHALATION) at 06:54

## 2018-06-14 RX ADMIN — Medication 1 CAPSULE: at 09:30

## 2018-06-14 RX ADMIN — INSULIN ASPART 20 UNITS: 100 INJECTION, SOLUTION INTRAVENOUS; SUBCUTANEOUS at 17:38

## 2018-06-14 RX ADMIN — INSULIN ASPART 20 UNITS: 100 INJECTION, SOLUTION INTRAVENOUS; SUBCUTANEOUS at 11:44

## 2018-06-14 RX ADMIN — CEFTRIAXONE 1 G: 1 INJECTION, POWDER, FOR SOLUTION INTRAMUSCULAR; INTRAVENOUS at 17:39

## 2018-06-14 RX ADMIN — IPRATROPIUM BROMIDE AND ALBUTEROL SULFATE 3 ML: .5; 3 SOLUTION RESPIRATORY (INHALATION) at 12:57

## 2018-06-14 RX ADMIN — IPRATROPIUM BROMIDE AND ALBUTEROL SULFATE 3 ML: .5; 3 SOLUTION RESPIRATORY (INHALATION) at 01:20

## 2018-06-14 RX ADMIN — SODIUM CHLORIDE 75 ML/HR: 9 INJECTION, SOLUTION INTRAVENOUS at 19:01

## 2018-06-14 RX ADMIN — DOXYCYCLINE 100 MG: 100 INJECTION, POWDER, LYOPHILIZED, FOR SOLUTION INTRAVENOUS at 20:21

## 2018-06-14 RX ADMIN — BUDESONIDE AND FORMOTEROL FUMARATE DIHYDRATE 2 PUFF: 80; 4.5 AEROSOL RESPIRATORY (INHALATION) at 19:13

## 2018-06-14 NOTE — PLAN OF CARE
Problem: Fall Risk (Adult)  Goal: Identify Related Risk Factors and Signs and Symptoms  Outcome: Ongoing (interventions implemented as appropriate)    Goal: Absence of Fall  Outcome: Ongoing (interventions implemented as appropriate)      Problem: Skin Injury Risk (Adult)  Goal: Identify Related Risk Factors and Signs and Symptoms  Outcome: Ongoing (interventions implemented as appropriate)      Problem: Patient Care Overview  Goal: Interprofessional Rounds/Family Conf  Outcome: Ongoing (interventions implemented as appropriate)      Problem: Diabetes, Type 2 (Adult)  Goal: Signs and Symptoms of Listed Potential Problems Will be Absent, Minimized or Managed (Diabetes, Type 2)  Outcome: Ongoing (interventions implemented as appropriate)

## 2018-06-14 NOTE — PROGRESS NOTES
Subjective     History:   Mayra Hays is a 72 y.o. female admitted on 6/12/2018 secondary to Pneumonia of left lower lobe due to infectious organism     Procedures: None    Patient seen and examined with JAMES Lombardi. Awake and alert. Currently resting comfortably in bed. Reports improvement in her dyspnea. Reports productive cough with yellow sputum production. Denies CP or palpitations. Denies nausea or vomiting. Hyperglycemic again today. No acute events overnight per RN.     History taken from: patient, chart, and RN.      Objective     Vital Signs  Temp:  [97.6 °F (36.4 °C)-97.8 °F (36.6 °C)] 97.8 °F (36.6 °C)  Heart Rate:  [] 74  Resp:  [18-22] 18  BP: (100-146)/(54-75) 116/54    Intake/Output Summary (Last 24 hours) at 06/14/18 1715  Last data filed at 06/14/18 1200   Gross per 24 hour   Intake             1300 ml   Output                0 ml   Net             1300 ml         Physical Exam:  General:    Awake, alert, in no acute distress   Heart:      Normal S1 and S2. Regular rate and rhythm. No significant murmur, rubs or gallops appreciated.   Lungs:     Respirations regular, even and unlabored. LLL rhonchi but improved from yesterday. Occ scattered rales. No wheezes.   Abdomen:   Soft and nontender. No guarding, rebound tenderness or  organomegaly noted. Bowel sounds present x 4.   Extremities:  Tr bilateral lower extremity edema. Moves UE and LE equally B/L.     Results Review:      Results from last 7 days  Lab Units 06/14/18  0255 06/13/18  0209 06/12/18  1745   WBC 10*3/mm3 19.22* 18.53* 18.80*   HEMOGLOBIN g/dL 12.0 11.7* 12.7   PLATELETS 10*3/mm3 197 178 192       Results from last 7 days  Lab Units 06/14/18  0255 06/13/18  1948 06/13/18  0209 06/12/18  1745   SODIUM mmol/L 135 133* 137 138   POTASSIUM mmol/L 4.2 4.1 4.1 4.3   CHLORIDE mmol/L 107 104 103 102   CO2 mmol/L 21.5* 18.4* 23.1* 27.2   BUN mg/dL 39* 40* 30* 27*   CREATININE mg/dL 1.31* 1.51* 1.55* 1.32*   CALCIUM mg/dL 8.8 9.0 8.9 9.4    GLUCOSE mg/dL 355* 492* 309* 156*       Results from last 7 days  Lab Units 06/14/18  0255 06/13/18  0209 06/12/18  1745   BILIRUBIN mg/dL 0.2 0.6 1.0   ALK PHOS U/L 78 76 89   AST (SGOT) U/L 15 16 18   ALT (SGPT) U/L 15 18 20       Results from last 7 days  Lab Units 06/14/18  0255 06/13/18  0801   MAGNESIUM mg/dL 2.8* 1.9           Results from last 7 days  Lab Units 06/13/18  0801 06/13/18  0209 06/12/18  2038   CK TOTAL U/L 41 41 44   TROPONIN I ng/mL <0.006 0.006 0.007   CK MB INDEX % 1.2  --   --    MYOGLOBIN ng/mL 79.0 94.0 76.0       Imaging Results (last 24 hours)     ** No results found for the last 24 hours. **            Medications:    atorvastatin 20 mg Oral Daily   budesonide-formoterol 2 puff Inhalation BID - RT   ceftriaxone 1 g Intravenous Q24H   doxycycline 100 mg Intravenous Q12H   gabapentin 300 mg Oral Nightly   heparin (porcine) 5,000 Units Subcutaneous Q12H   insulin aspart 0-24 Units Subcutaneous 4x Daily AC & at Bedtime   insulin detemir 15 Units Subcutaneous Nightly   ipratropium-albuterol 3 mL Nebulization Q6H - RT   lactobacillus acidophilus 1 capsule Oral Daily   methylPREDNISolone sodium succinate 40 mg Intravenous Q12H   nystatin 1 application Topical TID   pantoprazole 40 mg Oral Daily       sodium chloride 75 mL/hr Last Rate: 75 mL/hr (06/14/18 0524)           Assessment/Plan   Sepsis: Likely 2/2 bibasilar pneumonia. Remains afebrile and hemodynamically stable. Tachypnea and tachycardia have improved. CRP improved today. WBC slightly worse but pt is also on steroids. Currently on Rocephin and Doxycycline. Cont to follow cultures and repeat labs in the AM.     Bibasilar pneumonia, community acquired: Atypical workup is negative. Cont antibiotics as above. Repeat labs and CXR in the AM.     Acute exacerbation of COPD: Cont antibiotics as above, steroids, Symbicort and scheduled nebs. Decrease steroids today.     Acute hypoxic respiratory failure: Likely 2/2 above. Cont treatment as  outlined above. Pt currently maintaining O2% in the 90's on RA.     DONNIE: Holding any potential nephrotoxic agents. Improved today. Cont gentle IVF's. Repeat labs in the AM.     DM II, non-insulin dependent with hyperglycemia: HgA1c is 6.5. Steroids likely contributing to hyperglycemia. Holding metformin. Increase basal insulin. Cont high dose sliding scale.     Chronic systolic CHF: EF 41-45% in 9/17. Appears compensated at present. Holding Lasix. Cont to monitor I/O's.     DVT PPX: SQ heparin    Pt is at high risk 2/2 sepsis, pneumonia, COPD exacerbation, resp failure, DONNIE, CHF and DM with hyperglycemia.         Madhu Hoffmann,   06/14/18  5:15 PM

## 2018-06-14 NOTE — PLAN OF CARE
Problem: Fall Risk (Adult)  Goal: Identify Related Risk Factors and Signs and Symptoms  Outcome: Ongoing (interventions implemented as appropriate)   06/12/18 2356   Fall Risk (Adult)   Related Risk Factors (Fall Risk) environment unfamiliar   Signs and Symptoms (Fall Risk) presence of risk factors     Goal: Absence of Fall  Outcome: Ongoing (interventions implemented as appropriate)   06/12/18 2356   Fall Risk (Adult)   Absence of Fall making progress toward outcome       Problem: Pneumonia (Adult)  Goal: Signs and Symptoms of Listed Potential Problems Will be Absent, Minimized or Managed (Pneumonia)  Outcome: Ongoing (interventions implemented as appropriate)   06/12/18 2356   Goal/Outcome Evaluation   Problems Assessed (Pneumonia) all   Problems Present (Pneumonia) fluid/electrolyte imbalance       Problem: Skin Injury Risk (Adult)  Goal: Identify Related Risk Factors and Signs and Symptoms  Outcome: Ongoing (interventions implemented as appropriate)   06/12/18 2356   Skin Injury Risk (Adult)   Related Risk Factors (Skin Injury Risk) advanced age;body weight extremes;infection     Goal: Skin Health and Integrity  Outcome: Ongoing (interventions implemented as appropriate)   06/12/18 2356   Skin Injury Risk (Adult)   Skin Health and Integrity making progress toward outcome       Problem: Patient Care Overview  Goal: Plan of Care Review  Outcome: Ongoing (interventions implemented as appropriate)   06/13/18 2000 06/13/18 2327   Coping/Psychosocial   Plan of Care Reviewed With patient --    Plan of Care Review   Progress --  no change     Goal: Discharge Needs Assessment  Outcome: Ongoing (interventions implemented as appropriate)   06/12/18 2111 06/13/18 1204   Disability   Equipment Currently Used at Home --  oxygen;nebulizer;cane, quad;walker, rolling;glucometer  (Pt has home oxygen @ 2 liters from [facility], rolling walker and glucometer from unknown provider, nebulizer machine (has medicine) and quad  cane from [employer]. )   Discharge Needs Assessment   Patient/Family Anticipates Transition to home;home with family --    Patient/Family Anticipated Services at Transition other (see comments) --    Transportation Anticipated --  family or friend will provide  (Daughter, Hailey Prince to provide transportation at discharge. )   Outpatient/Agency/Support Group Needs --  (Pt does not utilize home health services and she denies need for services at this time. )     Goal: Interprofessional Rounds/Family Conf  Outcome: Ongoing (interventions implemented as appropriate)   06/13/18 2327   Interdisciplinary Rounds/Family Conf   Participants nursing       Problem: Diabetes, Type 2 (Adult)  Goal: Signs and Symptoms of Listed Potential Problems Will be Absent, Minimized or Managed (Diabetes, Type 2)  Outcome: Ongoing (interventions implemented as appropriate)   06/13/18 1804   Goal/Outcome Evaluation   Problems Assessed (Type 2 Diabetes) hyperglycemia

## 2018-06-15 ENCOUNTER — APPOINTMENT (OUTPATIENT)
Dept: GENERAL RADIOLOGY | Facility: HOSPITAL | Age: 73
End: 2018-06-15

## 2018-06-15 LAB
ANION GAP SERPL CALCULATED.3IONS-SCNC: 8.5 MMOL/L (ref 3.6–11.2)
BASOPHILS # BLD AUTO: 0.02 10*3/MM3 (ref 0–0.3)
BASOPHILS NFR BLD AUTO: 0.1 % (ref 0–2)
BNP SERPL-MCNC: 195 PG/ML (ref 0–100)
BUN BLD-MCNC: 42 MG/DL (ref 7–21)
BUN/CREAT SERPL: 34.1 (ref 7–25)
CALCIUM SPEC-SCNC: 8.2 MG/DL (ref 7.7–10)
CHLORIDE SERPL-SCNC: 109 MMOL/L (ref 99–112)
CO2 SERPL-SCNC: 19.5 MMOL/L (ref 24.3–31.9)
CREAT BLD-MCNC: 1.23 MG/DL (ref 0.43–1.29)
CRP SERPL-MCNC: 9.1 MG/DL (ref 0–0.99)
DEPRECATED RDW RBC AUTO: 44.5 FL (ref 37–54)
EOSINOPHIL # BLD AUTO: 0.01 10*3/MM3 (ref 0–0.7)
EOSINOPHIL NFR BLD AUTO: 0.1 % (ref 0–7)
ERYTHROCYTE [DISTWIDTH] IN BLOOD BY AUTOMATED COUNT: 13.8 % (ref 11.5–14.5)
GFR SERPL CREATININE-BSD FRML MDRD: 43 ML/MIN/1.73
GLUCOSE BLD-MCNC: 323 MG/DL (ref 70–110)
GLUCOSE BLDC GLUCOMTR-MCNC: 245 MG/DL (ref 70–130)
GLUCOSE BLDC GLUCOMTR-MCNC: 289 MG/DL (ref 70–130)
GLUCOSE BLDC GLUCOMTR-MCNC: 340 MG/DL (ref 70–130)
GLUCOSE BLDC GLUCOMTR-MCNC: 349 MG/DL (ref 70–130)
HCT VFR BLD AUTO: 35.9 % (ref 37–47)
HGB BLD-MCNC: 11.4 G/DL (ref 12–16)
IMM GRANULOCYTES # BLD: 0.41 10*3/MM3 (ref 0–0.03)
IMM GRANULOCYTES NFR BLD: 2.7 % (ref 0–0.5)
LYMPHOCYTES # BLD AUTO: 1.01 10*3/MM3 (ref 1–3)
LYMPHOCYTES NFR BLD AUTO: 6.6 % (ref 16–46)
MAGNESIUM SERPL-MCNC: 2.5 MG/DL (ref 1.7–2.6)
MCH RBC QN AUTO: 28.6 PG (ref 27–33)
MCHC RBC AUTO-ENTMCNC: 31.8 G/DL (ref 33–37)
MCV RBC AUTO: 90 FL (ref 80–94)
MONOCYTES # BLD AUTO: 0.8 10*3/MM3 (ref 0.1–0.9)
MONOCYTES NFR BLD AUTO: 5.2 % (ref 0–12)
NEUTROPHILS # BLD AUTO: 13.15 10*3/MM3 (ref 1.4–6.5)
NEUTROPHILS NFR BLD AUTO: 85.3 % (ref 40–75)
OSMOLALITY SERPL CALC.SUM OF ELEC: 296.8 MOSM/KG (ref 273–305)
PLATELET # BLD AUTO: 220 10*3/MM3 (ref 130–400)
PMV BLD AUTO: 12.2 FL (ref 6–10)
POTASSIUM BLD-SCNC: 4.7 MMOL/L (ref 3.5–5.3)
RBC # BLD AUTO: 3.99 10*6/MM3 (ref 4.2–5.4)
SODIUM BLD-SCNC: 137 MMOL/L (ref 135–153)
WBC NRBC COR # BLD: 15.4 10*3/MM3 (ref 4.5–12.5)

## 2018-06-15 PROCEDURE — 63710000001 PREDNISONE PER 5 MG: Performed by: NURSE PRACTITIONER

## 2018-06-15 PROCEDURE — 94799 UNLISTED PULMONARY SVC/PX: CPT

## 2018-06-15 PROCEDURE — 63710000001 PREDNISONE PER 1 MG: Performed by: NURSE PRACTITIONER

## 2018-06-15 PROCEDURE — 25010000002 HEPARIN (PORCINE) PER 1000 UNITS: Performed by: HOSPITALIST

## 2018-06-15 PROCEDURE — 99232 SBSQ HOSP IP/OBS MODERATE 35: CPT | Performed by: INTERNAL MEDICINE

## 2018-06-15 PROCEDURE — 71045 X-RAY EXAM CHEST 1 VIEW: CPT

## 2018-06-15 PROCEDURE — 25010000002 METHYLPREDNISOLONE PER 40 MG: Performed by: INTERNAL MEDICINE

## 2018-06-15 PROCEDURE — 83735 ASSAY OF MAGNESIUM: CPT | Performed by: INTERNAL MEDICINE

## 2018-06-15 PROCEDURE — 63710000001 INSULIN ASPART PER 5 UNITS: Performed by: PHYSICIAN ASSISTANT

## 2018-06-15 PROCEDURE — 85025 COMPLETE CBC W/AUTO DIFF WBC: CPT | Performed by: INTERNAL MEDICINE

## 2018-06-15 PROCEDURE — 25010000002 CEFTRIAXONE: Performed by: HOSPITALIST

## 2018-06-15 PROCEDURE — 86140 C-REACTIVE PROTEIN: CPT | Performed by: INTERNAL MEDICINE

## 2018-06-15 PROCEDURE — 63710000001 INSULIN DETEMIR PER 5 UNITS: Performed by: NURSE PRACTITIONER

## 2018-06-15 PROCEDURE — 83880 ASSAY OF NATRIURETIC PEPTIDE: CPT | Performed by: NURSE PRACTITIONER

## 2018-06-15 PROCEDURE — 80048 BASIC METABOLIC PNL TOTAL CA: CPT | Performed by: INTERNAL MEDICINE

## 2018-06-15 PROCEDURE — 82962 GLUCOSE BLOOD TEST: CPT

## 2018-06-15 PROCEDURE — 71045 X-RAY EXAM CHEST 1 VIEW: CPT | Performed by: RADIOLOGY

## 2018-06-15 RX ADMIN — SODIUM CHLORIDE 75 ML/HR: 9 INJECTION, SOLUTION INTRAVENOUS at 11:42

## 2018-06-15 RX ADMIN — CEFTRIAXONE 1 G: 1 INJECTION, POWDER, FOR SOLUTION INTRAMUSCULAR; INTRAVENOUS at 17:18

## 2018-06-15 RX ADMIN — INSULIN DETEMIR 20 UNITS: 100 INJECTION, SOLUTION SUBCUTANEOUS at 20:09

## 2018-06-15 RX ADMIN — Medication 1 CAPSULE: at 08:20

## 2018-06-15 RX ADMIN — DOXYCYCLINE 100 MG: 100 INJECTION, POWDER, LYOPHILIZED, FOR SOLUTION INTRAVENOUS at 08:16

## 2018-06-15 RX ADMIN — NYSTATIN 1 APPLICATION: 100000 POWDER TOPICAL at 20:07

## 2018-06-15 RX ADMIN — METHYLPREDNISOLONE SODIUM SUCCINATE 20 MG: 40 INJECTION, POWDER, FOR SOLUTION INTRAMUSCULAR; INTRAVENOUS at 05:08

## 2018-06-15 RX ADMIN — IPRATROPIUM BROMIDE AND ALBUTEROL SULFATE 3 ML: .5; 3 SOLUTION RESPIRATORY (INHALATION) at 13:03

## 2018-06-15 RX ADMIN — IPRATROPIUM BROMIDE AND ALBUTEROL SULFATE 3 ML: .5; 3 SOLUTION RESPIRATORY (INHALATION) at 01:05

## 2018-06-15 RX ADMIN — INSULIN ASPART 8 UNITS: 100 INJECTION, SOLUTION INTRAVENOUS; SUBCUTANEOUS at 17:19

## 2018-06-15 RX ADMIN — NYSTATIN 1 APPLICATION: 100000 POWDER TOPICAL at 08:26

## 2018-06-15 RX ADMIN — INSULIN ASPART 16 UNITS: 100 INJECTION, SOLUTION INTRAVENOUS; SUBCUTANEOUS at 20:07

## 2018-06-15 RX ADMIN — BUDESONIDE AND FORMOTEROL FUMARATE DIHYDRATE 2 PUFF: 80; 4.5 AEROSOL RESPIRATORY (INHALATION) at 07:17

## 2018-06-15 RX ADMIN — ATORVASTATIN CALCIUM 20 MG: 20 TABLET, FILM COATED ORAL at 08:20

## 2018-06-15 RX ADMIN — HEPARIN SODIUM 5000 UNITS: 5000 INJECTION, SOLUTION INTRAVENOUS; SUBCUTANEOUS at 20:08

## 2018-06-15 RX ADMIN — INSULIN ASPART 12 UNITS: 100 INJECTION, SOLUTION INTRAVENOUS; SUBCUTANEOUS at 11:41

## 2018-06-15 RX ADMIN — BUDESONIDE AND FORMOTEROL FUMARATE DIHYDRATE 2 PUFF: 80; 4.5 AEROSOL RESPIRATORY (INHALATION) at 19:11

## 2018-06-15 RX ADMIN — NYSTATIN 1 APPLICATION: 100000 POWDER TOPICAL at 16:19

## 2018-06-15 RX ADMIN — IPRATROPIUM BROMIDE AND ALBUTEROL SULFATE 3 ML: .5; 3 SOLUTION RESPIRATORY (INHALATION) at 07:17

## 2018-06-15 RX ADMIN — PANTOPRAZOLE SODIUM 40 MG: 40 TABLET, DELAYED RELEASE ORAL at 08:20

## 2018-06-15 RX ADMIN — IPRATROPIUM BROMIDE AND ALBUTEROL SULFATE 3 ML: .5; 3 SOLUTION RESPIRATORY (INHALATION) at 19:11

## 2018-06-15 RX ADMIN — PREDNISONE 30 MG: 20 TABLET ORAL at 14:21

## 2018-06-15 RX ADMIN — INSULIN ASPART 8 UNITS: 100 INJECTION, SOLUTION INTRAVENOUS; SUBCUTANEOUS at 08:18

## 2018-06-15 RX ADMIN — HEPARIN SODIUM 5000 UNITS: 5000 INJECTION, SOLUTION INTRAVENOUS; SUBCUTANEOUS at 08:20

## 2018-06-15 RX ADMIN — DOXYCYCLINE 100 MG: 100 INJECTION, POWDER, LYOPHILIZED, FOR SOLUTION INTRAVENOUS at 20:08

## 2018-06-15 RX ADMIN — GABAPENTIN 300 MG: 300 CAPSULE ORAL at 20:08

## 2018-06-15 NOTE — PROGRESS NOTES
Patient Identification:  Name:  Mayra Hays  Age:  72 y.o.  Sex:  female  :  1945  MRN:  [id number]  Visit Number:  [id number]  Primary Care Provider:  ERASTO Tomlinson    Length of stay:  3    Subjective:      Mrs. Hays is a 72 year old female who was admitted on 18 for community acquired pneumonia. She was found to have a DONNIE and sepsis. She has been treated with IV steroids, IV antibiotics, fluid resuscitation and nebulizer's and is feeling some better today. She denies shortness of breath, she is coughing sometimes it is productive, no nausea, vomiting or abdominal pain. She is in no distress on exam, she is on room air Sp02 is maintaining >95%. Spoke with nurse, Maria C and she has no concerns.     [place]: Pt seen and examined with JAMES Lombardi. Pt is awake and alert, currently resting comfortably in bed. Reports continued improvement in her dyspnea. States she is no longer dyspneic when she gets up to ambulate. Reports productive cough with yellow sputum production. Denies fever or chills. Denies CP or palpitations. No acute events overnight per RN.     ----------------------------------------------------------------------------------------------------------------------  Current Hospital Meds:    atorvastatin 20 mg Oral Daily   budesonide-formoterol 2 puff Inhalation BID - RT   ceftriaxone 1 g Intravenous Q24H   doxycycline 100 mg Intravenous Q12H   gabapentin 300 mg Oral Nightly   heparin (porcine) 5,000 Units Subcutaneous Q12H   insulin aspart 0-24 Units Subcutaneous 4x Daily AC & at Bedtime   insulin detemir 15 Units Subcutaneous Nightly   ipratropium-albuterol 3 mL Nebulization Q6H - RT   lactobacillus acidophilus 1 capsule Oral Daily   methylPREDNISolone sodium succinate 20 mg Intravenous Q12H   nystatin 1 application Topical TID   pantoprazole 40 mg Oral Daily       sodium chloride 75 mL/hr Last Rate: 75 mL/hr (06/15/18 5050)      ----------------------------------------------------------------------------------------------------------------------  Vital Signs:  Temp:  [97.8 °F (36.6 °C)-98 °F (36.7 °C)] 98 °F (36.7 °C)  Heart Rate:  [] 70  Resp:  [18] 18  BP: (101-156)/(51-78) 156/78  1    06/12/18  1739   Weight: 103 kg (227 lb)     Body mass index is 34.52 kg/m².    Intake/Output Summary (Last 24 hours) at 06/15/18 1213  Last data filed at 06/15/18 0816   Gross per 24 hour   Intake             1920 ml   Output                0 ml   Net             1920 ml     I/O this shift:  In: 460 [P.O.:360; IV Piggyback:100]  Out: -   Diet Regular; Cardiac, Consistent Carbohydrate  ----------------------------------------------------------------------------------------------------------------------  Physical exam:  Constitutional:  Well-developed and well-nourished.  No respiratory distress.      HENT:  Head:  Normocephalic and atraumatic.  Mouth:  Moist mucous membranes.    Eyes:  Conjunctivae and EOM are normal.  Pupils are equal, round, and reactive to light.  No scleral icterus.    Neck:  Neck supple.  No JVD present.    Cardiovascular:  Normal rate, regular rhythm and normal heart sounds with no murmur.  Pulmonary/Chest:  No respiratory distress, no wheezes with normal breath sounds and good air movement. Fine crackles bilateral bases  Abdominal:  Soft.  Bowel sounds are present x 4.  No distension and no tenderness.   Musculoskeletal:  Mild lower extremity edema, no tenderness, and no deformity.  No red or swollen joints anywhere.  .   Skin:  Skin is warm and dry. No rash noted. No pallor.   ----------------------------------------------------------------------------------------------------------------------  Tele:    ----------------------------------------------------------------------------------------------------------------------    Results from last 7 days  Lab Units 06/13/18  0801 06/13/18  0209 06/12/18 2033   CK TOTAL U/L 41  41 44   TROPONIN I ng/mL <0.006 0.006 0.007   CK MB INDEX % 1.2  --   --    MYOGLOBIN ng/mL 79.0 94.0 76.0       Results from last 7 days  Lab Units 06/15/18  0137 06/14/18  0255 06/13/18  0209 06/12/18  1745   CRP mg/dL 9.10* 20.68*  --  25.66*   LACTATE mmol/L  --   --   --  1.6   WBC 10*3/mm3 15.40* 19.22* 18.53* 18.80*   HEMOGLOBIN g/dL 11.4* 12.0 11.7* 12.7   HEMATOCRIT % 35.9* 37.6 37.8 39.8   MCV fL 90.0 88.1 89.4 87.3   MCHC g/dL 31.8* 31.9* 31.0* 31.9*   PLATELETS 10*3/mm3 220 197 178 192       Results from last 7 days  Lab Units 06/12/18  1805   PH, ARTERIAL pH units 7.462*   PO2 ART mm Hg 49.8*   PCO2, ARTERIAL mm Hg 37.8   HCO3 ART mmol/L 26.4*       Results from last 7 days  Lab Units 06/15/18  0137 06/14/18  0255 06/13/18  1948 06/13/18  0801 06/13/18  0209 06/12/18  1745   SODIUM mmol/L 137 135 133*  --  137 138   POTASSIUM mmol/L 4.7 4.2 4.1  --  4.1 4.3   MAGNESIUM mg/dL 2.5 2.8*  --  1.9  --   --    CHLORIDE mmol/L 109 107 104  --  103 102   CO2 mmol/L 19.5* 21.5* 18.4*  --  23.1* 27.2   BUN mg/dL 42* 39* 40*  --  30* 27*   CREATININE mg/dL 1.23 1.31* 1.51*  --  1.55* 1.32*   EGFR IF NONAFRICN AM mL/min/1.73 43* 40* 34*  --  33* 40*   CALCIUM mg/dL 8.2 8.8 9.0  --  8.9 9.4   GLUCOSE mg/dL 323* 355* 492*  --  309* 156*   ALBUMIN g/dL  --  3.70  --   --  4.10 4.50   BILIRUBIN mg/dL  --  0.2  --   --  0.6 1.0   ALK PHOS U/L  --  78  --   --  76 89   AST (SGOT) U/L  --  15  --   --  16 18   ALT (SGPT) U/L  --  15  --   --  18 20   Estimated Creatinine Clearance: 52.6 mL/min (by C-G formula based on SCr of 1.23 mg/dL).  No results found for: AMMONIA      Blood Culture   Date Value Ref Range Status   06/12/2018 No growth at 2 days  Preliminary   06/12/2018 No growth at 2 days  Preliminary              ----------------------------------------------------------------------------------------------------------------------  Imaging Results (last 24 hours)     Procedure Component Value Units Date/Time    XR  Chest AP [[id number]] Collected:  06/15/18 0845     Updated:  06/15/18 0847    Narrative:       EXAMINATION:  XR CHEST AP-      CLINICAL INDICATION:     Pneumonia; J18.1-Lobar pneumonia, unspecified  organism     TECHNIQUE:  XR CHEST AP-       COMPARISON: 06/12/2018      FINDINGS:   Patchy bibasilar airspace opacity is stable.   Heart size is stable.   No pneumothorax.   No pleural effusion.   Bony and soft tissue structures are unremarkable.            Impression:       Patchy bibasilar airspace opacity is stable.      This report was finalized on 6/15/2018 8:45 AM by Dr. Bill Parham MD.           ----------------------------------------------------------------------------------------------------------------------  Assessment and Plan:    Sepsis: likely 2/2 Bibasilar CAP: atypical's are negative. WBC is 15.40, neutrophils are 85.3, CRP is 9.10, all trending down. No fevers, HR controlled, no tachypnea. Continue antibiotics. Continue supplemental oxygen to maintain Sp02 >92-94%. Incentive spirometer 10 times an hour.     COPD exacerbation: continue steroid taper, continue IV antibiotics, continue scheduled inhalants and nebulizer's. IV medrol changed to prednisone 30mg PO daily, will wean as tolerated.     Acute hypoxic respiratory failure: related to above as mentioned, continue current treatment, 99% on room air.     DONNIE: Creatinine is 1.23, avoid nephrotoxic agents.  NS discontinued, she is eating and drinking well, repeat labs in am.     DM type 2, non-insulin dependent: glucoses are 245-401, levemir at HS increased to 20 units.     Chronic Systolic HF: continue to hold lasix, chest xray appears fluid overload, BNP ordered. monitor fluid status closely. Repeat chest xray in the am as well as BNP.    DVT prophylaxis: heparin sq.     Patient is high risk for the following reasons:     The patient is high risk due to the following diagnoses/reasons:  Sepsis r/t CAP, COPD exac., respiratory failure, DONNIE, CHF and  Uncontrolled DM    ERASTO Elizabeth  06/15/18  12:13 PM      I have reviewed the notes, assessments, and/or procedures performed by ERASTO Hester. I concur with her/his documentation of Mayra Hays.    Madhu Hoffmann D.O.

## 2018-06-15 NOTE — ACP (ADVANCE CARE PLANNING)
Consult received for Advance Care Planning. Pt provided information on ACP and will contact with further needs or questions.

## 2018-06-15 NOTE — PLAN OF CARE
Problem: Fall Risk (Adult)  Goal: Identify Related Risk Factors and Signs and Symptoms  Outcome: Ongoing (interventions implemented as appropriate)      Problem: Skin Injury Risk (Adult)  Goal: Identify Related Risk Factors and Signs and Symptoms  Outcome: Ongoing (interventions implemented as appropriate)      Problem: Patient Care Overview  Goal: Plan of Care Review  Outcome: Ongoing (interventions implemented as appropriate)

## 2018-06-15 NOTE — PLAN OF CARE
Problem: Fall Risk (Adult)  Goal: Identify Related Risk Factors and Signs and Symptoms  Outcome: Ongoing (interventions implemented as appropriate)   06/12/18 2356 06/14/18 1539   Fall Risk (Adult)   Related Risk Factors (Fall Risk) --  gait/mobility problems;environment unfamiliar   Signs and Symptoms (Fall Risk) presence of risk factors --      Goal: Absence of Fall  Outcome: Ongoing (interventions implemented as appropriate)   06/14/18 1535   Fall Risk (Adult)   Absence of Fall making progress toward outcome       Problem: Pneumonia (Adult)  Goal: Signs and Symptoms of Listed Potential Problems Will be Absent, Minimized or Managed (Pneumonia)  Outcome: Ongoing (interventions implemented as appropriate)   06/12/18 2356   Goal/Outcome Evaluation   Problems Assessed (Pneumonia) all   Problems Present (Pneumonia) fluid/electrolyte imbalance       Problem: Skin Injury Risk (Adult)  Goal: Identify Related Risk Factors and Signs and Symptoms  Outcome: Ongoing (interventions implemented as appropriate)   06/14/18 1535   Skin Injury Risk (Adult)   Related Risk Factors (Skin Injury Risk) advanced age;edema;infection;mobility impaired;medication     Goal: Skin Health and Integrity  Outcome: Ongoing (interventions implemented as appropriate)   06/12/18 8216   Skin Injury Risk (Adult)   Skin Health and Integrity making progress toward outcome       Problem: Patient Care Overview  Goal: Plan of Care Review  Outcome: Ongoing (interventions implemented as appropriate)   06/13/18 2327 06/14/18 2000   Coping/Psychosocial   Plan of Care Reviewed With --  patient   Plan of Care Review   Progress no change --      Goal: Discharge Needs Assessment  Outcome: Ongoing (interventions implemented as appropriate)   06/12/18 2111 06/13/18 1204   Disability   Equipment Currently Used at Home --  oxygen;nebulizer;cane, quad;walker, rolling;glucometer  (Pt has home oxygen @ 2 liters from [facility], rolling walker and glucometer from  unknown provider, nebulizer machine (has medicine) and quad cane from [facility]. )   Discharge Needs Assessment   Patient/Family Anticipates Transition to home;home with family --    Patient/Family Anticipated Services at Transition other (see comments) --    Transportation Anticipated --  family or friend will provide  (Daughter, Hailey Prince to provide transportation at discharge. )   Outpatient/Agency/Support Group Needs --  (Pt does not utilize home health services and she denies need for services at this time. )     Goal: Interprofessional Rounds/Family Conf  Outcome: Ongoing (interventions implemented as appropriate)   06/13/18 2327   Interdisciplinary Rounds/Family Conf   Participants nursing       Problem: Diabetes, Type 2 (Adult)  Goal: Signs and Symptoms of Listed Potential Problems Will be Absent, Minimized or Managed (Diabetes, Type 2)  Outcome: Ongoing (interventions implemented as appropriate)   06/14/18 1535   Goal/Outcome Evaluation   Problems Assessed (Type 2 Diabetes) hyperglycemia   Problems Present (Type 2 Diabetes) hyperglycemia

## 2018-06-15 NOTE — PROGRESS NOTES
Antimicrobial Length of Therapy:    Day 4 Rocephin  Day 4 IV doxycycline    Thank you,    Marylin Vargas, H

## 2018-06-16 ENCOUNTER — APPOINTMENT (OUTPATIENT)
Dept: GENERAL RADIOLOGY | Facility: HOSPITAL | Age: 73
End: 2018-06-16

## 2018-06-16 VITALS
OXYGEN SATURATION: 98 % | TEMPERATURE: 97.6 F | WEIGHT: 233.06 LBS | RESPIRATION RATE: 18 BRPM | DIASTOLIC BLOOD PRESSURE: 90 MMHG | HEART RATE: 75 BPM | SYSTOLIC BLOOD PRESSURE: 154 MMHG | HEIGHT: 68 IN | BODY MASS INDEX: 35.32 KG/M2

## 2018-06-16 LAB
ALBUMIN SERPL-MCNC: 3.5 G/DL (ref 3.4–4.8)
ALBUMIN/GLOB SERPL: 1.3 G/DL (ref 1.5–2.5)
ALP SERPL-CCNC: 75 U/L (ref 35–104)
ALT SERPL W P-5'-P-CCNC: 13 U/L (ref 10–36)
ANION GAP SERPL CALCULATED.3IONS-SCNC: 5.8 MMOL/L (ref 3.6–11.2)
AST SERPL-CCNC: 12 U/L (ref 10–30)
BILIRUB SERPL-MCNC: 0.2 MG/DL (ref 0.2–1.8)
BNP SERPL-MCNC: 349 PG/ML (ref 0–100)
BUN BLD-MCNC: 28 MG/DL (ref 7–21)
BUN/CREAT SERPL: 27.7 (ref 7–25)
CALCIUM SPEC-SCNC: 8.7 MG/DL (ref 7.7–10)
CHLORIDE SERPL-SCNC: 111 MMOL/L (ref 99–112)
CO2 SERPL-SCNC: 21.2 MMOL/L (ref 24.3–31.9)
CREAT BLD-MCNC: 1.01 MG/DL (ref 0.43–1.29)
CRP SERPL-MCNC: 4.17 MG/DL (ref 0–0.99)
DEPRECATED RDW RBC AUTO: 43.3 FL (ref 37–54)
ERYTHROCYTE [DISTWIDTH] IN BLOOD BY AUTOMATED COUNT: 13.6 % (ref 11.5–14.5)
GFR SERPL CREATININE-BSD FRML MDRD: 54 ML/MIN/1.73
GLOBULIN UR ELPH-MCNC: 2.7 GM/DL
GLUCOSE BLD-MCNC: 208 MG/DL (ref 70–110)
GLUCOSE BLDC GLUCOMTR-MCNC: 192 MG/DL (ref 70–130)
GLUCOSE BLDC GLUCOMTR-MCNC: 230 MG/DL (ref 70–130)
HCT VFR BLD AUTO: 34.4 % (ref 37–47)
HGB BLD-MCNC: 11 G/DL (ref 12–16)
LYMPHOCYTES # BLD MANUAL: 1.22 10*3/MM3 (ref 1–3)
LYMPHOCYTES NFR BLD MANUAL: 10 % (ref 16–46)
LYMPHOCYTES NFR BLD MANUAL: 3 % (ref 0–12)
MCH RBC QN AUTO: 28.5 PG (ref 27–33)
MCHC RBC AUTO-ENTMCNC: 32 G/DL (ref 33–37)
MCV RBC AUTO: 89.1 FL (ref 80–94)
METAMYELOCYTES NFR BLD MANUAL: 1 % (ref 0–0)
MONOCYTES # BLD AUTO: 0.37 10*3/MM3 (ref 0.1–0.9)
MYELOCYTES NFR BLD MANUAL: 1 % (ref 0–0)
NEUTROPHILS # BLD AUTO: 10.4 10*3/MM3 (ref 1.4–6.5)
NEUTROPHILS NFR BLD MANUAL: 84 % (ref 40–75)
NEUTS BAND NFR BLD MANUAL: 1 % (ref 0–8)
OSMOLALITY SERPL CALC.SUM OF ELEC: 287.2 MOSM/KG (ref 273–305)
PLAT MORPH BLD: NORMAL
PLATELET # BLD AUTO: 193 10*3/MM3 (ref 130–400)
PMV BLD AUTO: 11.6 FL (ref 6–10)
POTASSIUM BLD-SCNC: 4.6 MMOL/L (ref 3.5–5.3)
PROT SERPL-MCNC: 6.2 G/DL (ref 6–8)
RBC # BLD AUTO: 3.86 10*6/MM3 (ref 4.2–5.4)
RBC MORPH BLD: NORMAL
SODIUM BLD-SCNC: 138 MMOL/L (ref 135–153)
WBC NRBC COR # BLD: 12.24 10*3/MM3 (ref 4.5–12.5)

## 2018-06-16 PROCEDURE — 85025 COMPLETE CBC W/AUTO DIFF WBC: CPT | Performed by: NURSE PRACTITIONER

## 2018-06-16 PROCEDURE — 80053 COMPREHEN METABOLIC PANEL: CPT | Performed by: NURSE PRACTITIONER

## 2018-06-16 PROCEDURE — 82962 GLUCOSE BLOOD TEST: CPT

## 2018-06-16 PROCEDURE — 94799 UNLISTED PULMONARY SVC/PX: CPT

## 2018-06-16 PROCEDURE — 71045 X-RAY EXAM CHEST 1 VIEW: CPT | Performed by: RADIOLOGY

## 2018-06-16 PROCEDURE — 63710000001 INSULIN ASPART PER 5 UNITS: Performed by: PHYSICIAN ASSISTANT

## 2018-06-16 PROCEDURE — 25010000002 HEPARIN (PORCINE) PER 1000 UNITS: Performed by: HOSPITALIST

## 2018-06-16 PROCEDURE — 83880 ASSAY OF NATRIURETIC PEPTIDE: CPT | Performed by: NURSE PRACTITIONER

## 2018-06-16 PROCEDURE — 71045 X-RAY EXAM CHEST 1 VIEW: CPT

## 2018-06-16 PROCEDURE — 86140 C-REACTIVE PROTEIN: CPT | Performed by: NURSE PRACTITIONER

## 2018-06-16 PROCEDURE — 85007 BL SMEAR W/DIFF WBC COUNT: CPT | Performed by: NURSE PRACTITIONER

## 2018-06-16 PROCEDURE — 63710000001 PREDNISONE PER 5 MG: Performed by: NURSE PRACTITIONER

## 2018-06-16 PROCEDURE — 99238 HOSP IP/OBS DSCHRG MGMT 30/<: CPT | Performed by: NURSE PRACTITIONER

## 2018-06-16 RX ORDER — PREDNISONE 20 MG/1
10 TABLET ORAL
Qty: 10 TABLET | Refills: 0 | Status: SHIPPED | OUTPATIENT
Start: 2018-06-17 | End: 2018-06-16

## 2018-06-16 RX ORDER — DOXYCYCLINE HYCLATE 100 MG/1
100 CAPSULE ORAL 2 TIMES DAILY
Qty: 6 CAPSULE | Refills: 0 | Status: SHIPPED | OUTPATIENT
Start: 2018-06-16 | End: 2018-06-24

## 2018-06-16 RX ORDER — PREDNISONE 10 MG/1
TABLET ORAL
Qty: 7 TABLET | Refills: 0 | Status: SHIPPED | OUTPATIENT
Start: 2018-06-16 | End: 2018-06-24

## 2018-06-16 RX ORDER — L.ACID,CASEI/B.ANIMAL/S.THERMO 16B CELL
1 CAPSULE ORAL DAILY
Qty: 3 CAPSULE | Refills: 0 | Status: SHIPPED | OUTPATIENT
Start: 2018-06-17 | End: 2018-06-24

## 2018-06-16 RX ORDER — PREDNISONE 20 MG/1
20 TABLET ORAL
Status: DISCONTINUED | OUTPATIENT
Start: 2018-06-17 | End: 2018-06-16 | Stop reason: HOSPADM

## 2018-06-16 RX ORDER — CEFDINIR 300 MG/1
300 CAPSULE ORAL 2 TIMES DAILY
Qty: 6 CAPSULE | Refills: 0 | Status: SHIPPED | OUTPATIENT
Start: 2018-06-16 | End: 2018-06-24

## 2018-06-16 RX ADMIN — DOXYCYCLINE 100 MG: 100 INJECTION, POWDER, LYOPHILIZED, FOR SOLUTION INTRAVENOUS at 08:37

## 2018-06-16 RX ADMIN — INSULIN ASPART 4 UNITS: 100 INJECTION, SOLUTION INTRAVENOUS; SUBCUTANEOUS at 07:36

## 2018-06-16 RX ADMIN — ATORVASTATIN CALCIUM 20 MG: 20 TABLET, FILM COATED ORAL at 08:36

## 2018-06-16 RX ADMIN — INSULIN ASPART 8 UNITS: 100 INJECTION, SOLUTION INTRAVENOUS; SUBCUTANEOUS at 11:24

## 2018-06-16 RX ADMIN — HEPARIN SODIUM 5000 UNITS: 5000 INJECTION, SOLUTION INTRAVENOUS; SUBCUTANEOUS at 08:36

## 2018-06-16 RX ADMIN — NYSTATIN 1 APPLICATION: 100000 POWDER TOPICAL at 08:37

## 2018-06-16 RX ADMIN — IPRATROPIUM BROMIDE AND ALBUTEROL SULFATE 3 ML: .5; 3 SOLUTION RESPIRATORY (INHALATION) at 06:53

## 2018-06-16 RX ADMIN — IPRATROPIUM BROMIDE AND ALBUTEROL SULFATE 3 ML: .5; 3 SOLUTION RESPIRATORY (INHALATION) at 00:23

## 2018-06-16 RX ADMIN — BUDESONIDE AND FORMOTEROL FUMARATE DIHYDRATE 2 PUFF: 80; 4.5 AEROSOL RESPIRATORY (INHALATION) at 06:53

## 2018-06-16 RX ADMIN — Medication 1 CAPSULE: at 08:36

## 2018-06-16 RX ADMIN — PANTOPRAZOLE SODIUM 40 MG: 40 TABLET, DELAYED RELEASE ORAL at 08:36

## 2018-06-16 RX ADMIN — PREDNISONE 30 MG: 20 TABLET ORAL at 08:36

## 2018-06-16 NOTE — PLAN OF CARE
Problem: Fall Risk (Adult)  Goal: Identify Related Risk Factors and Signs and Symptoms   06/12/18 2356 06/15/18 1338   Fall Risk (Adult)   Related Risk Factors (Fall Risk) --  fatigue/slow reaction;gait/mobility problems   Signs and Symptoms (Fall Risk) presence of risk factors --      Goal: Absence of Fall   06/15/18 2228   Fall Risk (Adult)   Absence of Fall making progress toward outcome       Problem: Pneumonia (Adult)  Goal: Signs and Symptoms of Listed Potential Problems Will be Absent, Minimized or Managed (Pneumonia)   06/12/18 2356   Goal/Outcome Evaluation   Problems Assessed (Pneumonia) all   Problems Present (Pneumonia) fluid/electrolyte imbalance       Problem: Skin Injury Risk (Adult)  Goal: Identify Related Risk Factors and Signs and Symptoms   06/15/18 1338   Skin Injury Risk (Adult)   Related Risk Factors (Skin Injury Risk) medication;infection     Goal: Skin Health and Integrity   06/15/18 2228   Skin Injury Risk (Adult)   Skin Health and Integrity making progress toward outcome       Problem: Patient Care Overview  Goal: Plan of Care Review   06/15/18 2228   Coping/Psychosocial   Plan of Care Reviewed With patient   Plan of Care Review   Progress no change       Problem: Diabetes, Type 2 (Adult)  Goal: Signs and Symptoms of Listed Potential Problems Will be Absent, Minimized or Managed (Diabetes, Type 2)   06/15/18 2228   Goal/Outcome Evaluation   Problems Assessed (Type 2 Diabetes) all   Problems Present (Type 2 Diabetes) situational response;hyperglycemia

## 2018-06-16 NOTE — DISCHARGE SUMMARY
[facility] HOSPITALISTS DISCHARGE SUMMARY    Patient Identification:  Name:  Mayra Hays  Age:  72 y.o.  Sex:  female  :  1945  MRN:  [id number]  Visit Number:  [id number]    Date of Admission: 2018  Date of Discharge:  2018     PCP: Priscila Eugene, APRN    DISCHARGE DIAGNOSIS  Sepsis  Bibasilar pneumonia, community acquired  COPD exacerbation  Acute hypoxic respiratory failure  Diabetes Type 2 Non-Insulin dependent   DONNIE  Chronic systolic CHF, overall compensated   GERD  Hyperlipidemia  Hypertension     CONSULTS   None    PROCEDURES PERFORMED  None    HOSPITAL COURSE  Ms. Hays is a 72 y.o. female who presented to [facility] complaining of cough and chest congestion.  Please see the admitting history and physical for further details.  In the ER she was found to have a left lower lob infiltrate and was started on doxycycline, rocephin, IV steroids and nebulizer's. She does have a history of heart failure but on admission she was also found to have an DONNIE and her home lasix was not continued during admission due to this, she was given gentle IV hydration and her creatinine improved nicely. She improved significantly clinically as well as her labs. Her hospital course was uneventful. On discharge her WBC is 12.24, no fevers, CRP is down to 4.17 and her creatinine is 1.01. We will discharge her home today on 3 more days of PO omnicef and doxycyline, and Prednisone taper. Her glucoses were elevated during the stay, highly likely related to her acute illness and IV steroids, she was started on levemir. We will not continue this at home as she will be off steroids in the coming days and her hgb A1c is 6.5 so her glucose is well controlled on her home regimen. Her home lasix was resumed upon discharge as her DONNIE had resolved and she had developed mild lower extremity edema. She will follow up with her family doctor in 1 week, would recommend at 6 week post pneumonia chest  xray for further follow up.     VITAL SIGNS:  Temp:  [97.3 °F (36.3 °C)-98 °F (36.7 °C)] 97.6 °F (36.4 °C)  Heart Rate:  [63-92] 75  Resp:  [18-20] 18  BP: (140-156)/(75-90) 154/90  SpO2:  [92 %-99 %] 98 %  on   ;   Device (Oxygen Therapy): room air  Body mass index is 35.44 kg/m².  Wt Readings from Last 3 Encounters:   06/16/18 106 kg (233 lb 1 oz)   04/12/18 104 kg (228 lb 6.4 oz)   10/11/17 101 kg (222 lb)       PHYSICAL EXAM:    Constitutional:  Well-developed and well-nourished, obese female  No respiratory distress.      HENT:  Head: Normocephalic and atraumatic.  Mouth:  Moist mucous membranes.    Eyes:  Conjunctivae and EOM are normal.  Pupils are equal, round, and reactive to light.  No scleral icterus.    Neck:  Neck supple.  No JVD present.    Cardiovascular:  Normal rate, regular rhythm and normal heart sounds with no murmur.  Pulmonary/Chest:  No respiratory distress, no wheezes, no crackles, with normal breath sounds and good air movement.  Abdominal:  Soft.  Bowel sounds are normal.  No distension and no tenderness.   Musculoskeletal:  No edema, no tenderness, and no deformity.  No red or swollen joints anywhere.    Neurological:  Alert and oriented to person, place, and time. No tongue deviation.  No facial droop.  No slurred speech.   Skin: Skin is warm and dry. No rash noted. No pallor.   Psychiatric:  Normal mood and affect.     Peripheral vascular:  strong pulses on all 4 extremities with no clubbing, no cyanosis, and trace lower extremity edema.    DISCHARGE DISPOSITION   Stable    DISCHARGE MEDICATIONS:     Discharge Medications      New Medications      Instructions Start Date   cefdinir 300 MG capsule  Commonly known as:  OMNICEF   300 mg, Oral, 2 Times Daily      doxycycline 100 MG capsule  Commonly known as:  VIBRAMYCIN   100 mg, Oral, 2 Times Daily      lactobacillus acidophilus capsule capsule   1 capsule, Oral, Daily   Start Date:  6/17/2018     predniSONE 20 MG tablet  Commonly known  as:  DELTASONE   10 mg, Oral, Daily With Breakfast, 20mg for 2 days, 10mg for 2 days   Start Date:  6/17/2018        Continue These Medications      Instructions Start Date   atenolol 25 MG tablet  Commonly known as:  TENORMIN   25 mg, Oral, Daily      atorvastatin 20 MG tablet  Commonly known as:  LIPITOR   20 mg, Oral, Daily      BREO ELLIPTA 200-25 MCG/INH aerosol powder   Generic drug:  Fluticasone Furoate-Vilanterol   1 puff, Inhalation, Daily      esomeprazole 20 MG capsule  Commonly known as:  nexIUM   20 mg, Oral, Every Morning Before Breakfast      ferrous sulfate 325 (65 FE) MG tablet   325 mg, Oral, 2 Times Daily      furosemide 20 MG tablet  Commonly known as:  LASIX   20 mg, Oral, Daily      gabapentin 300 MG capsule  Commonly known as:  NEURONTIN   300 mg, Oral, Nightly      ipratropium-albuterol 0.5-2.5 mg/3 ml nebulizer  Commonly known as:  DUO-NEB   3 mL, Nebulization, Every 4 Hours PRN      lisinopril 5 MG tablet  Commonly known as:  PRINIVIL,ZESTRIL   5 mg, Oral, Daily      metFORMIN 500 MG tablet  Commonly known as:  GLUCOPHAGE   500 mg, Oral, 2 Times Daily With Meals      nystatin 639327 UNIT/GM powder  Commonly known as:  MYCOSTATIN   1 application, Topical, 3 Times Daily, Prior to [facility] Admission, Patient was on: applies under left arm pit area      potassium chloride 20 MEQ CR tablet  Commonly known as:  K-DUR,KLOR-CON   20 mEq, Oral, Daily      traZODone 50 MG tablet  Commonly known as:  DESYREL   50 mg, Oral, Nightly         Stop These Medications    acetaminophen 325 MG tablet  Commonly known as:  TYLENOL     magnesium oxide 400 MG tablet  Commonly known as:  MAG-OX     pantoprazole 40 MG EC tablet  Commonly known as:  PROTONIX            Diet Instructions     Diet: Consistent Carbohydrate       Discharge Diet:  Consistent Carbohydrate        Future Appointments  Date Time Provider Department Center   7/11/2018 12:50 PM Jatinder Matias MD [facility] None   10/12/2018 10:20 AM M.  Annamarie Brewer MD [facility] None     Your Scheduled Appointments    Jul 11, 2018 12:50 PM EDT  Follow Up with Jatinder Matias MD  [facility] CARDIOLOGY (--) [address]  [facility] [id number] [phone]   Arrive 15 minutes prior to appointment.   Oct 12, 2018 10:20 AM EDT  Follow Up with NAOMI Brewer MD  [facility] PULMONOLOGY CRITICAL CARE (--) [address] [facility] [address]  [facility] [id number] [phone]   Arrive 15 minutes prior to appointment.        Additional Instructions for the Follow-ups that You Need to Schedule     Discharge Follow-up with PCP    As directed      Follow Up Details:  Priscila MAURER, 1 week -- will need CXR in 6 weeks           Follow-up Information     ERASTO Tomlinson .    Specialty:  Family Medicine  Why:  Priscila MAURER, 1 week -- will need CXR in 6 weeks  Contact information:  [address]  [facility] [id number] [phone]                    TEST  RESULTS PENDING AT DISCHARGE   Order Current Status    Blood Culture - Blood, Preliminary result    Blood Culture - Blood, Preliminary result           CODE STATUS  Full Code    ERASTO Elizabeth  06/16/18  10:29 AM     I have reviewed the notes, assessments, and/or procedures performed by ERASTO Hester. I concur with her/his documentation of Mayra Hays.    Madhu Hoffmann D.O.     Please send a copy of this dictation to the following providers:  ERASTO Tomlinson

## 2018-06-16 NOTE — NURSING NOTE
Spoke to Vishal at [facility]. He will be delivering a wheelchair to the hospital.

## 2018-06-17 LAB
BACTERIA SPEC AEROBE CULT: NORMAL
BACTERIA SPEC AEROBE CULT: NORMAL

## 2018-06-18 ENCOUNTER — TELEPHONE (OUTPATIENT)
Dept: MEDSURG UNIT | Facility: HOSPITAL | Age: 73
End: 2018-06-18

## 2018-06-24 ENCOUNTER — APPOINTMENT (OUTPATIENT)
Dept: GENERAL RADIOLOGY | Facility: HOSPITAL | Age: 73
End: 2018-06-24

## 2018-06-24 ENCOUNTER — APPOINTMENT (OUTPATIENT)
Dept: CT IMAGING | Facility: HOSPITAL | Age: 73
End: 2018-06-24

## 2018-06-24 ENCOUNTER — HOSPITAL ENCOUNTER (INPATIENT)
Facility: HOSPITAL | Age: 73
LOS: 1 days | Discharge: HOME | End: 2018-06-27
Attending: INTERNAL MEDICINE | Admitting: INTERNAL MEDICINE

## 2018-06-24 DIAGNOSIS — E86.0 DEHYDRATION: ICD-10-CM

## 2018-06-24 DIAGNOSIS — N28.9 ACUTE RENAL INSUFFICIENCY: Primary | ICD-10-CM

## 2018-06-24 DIAGNOSIS — R53.81 PHYSICAL DECONDITIONING: ICD-10-CM

## 2018-06-24 LAB
ALBUMIN SERPL-MCNC: 4.1 G/DL (ref 3.4–4.8)
ALBUMIN/GLOB SERPL: 1.3 G/DL (ref 1.5–2.5)
ALP SERPL-CCNC: 86 U/L (ref 35–104)
ALT SERPL W P-5'-P-CCNC: 20 U/L (ref 10–36)
AMYLASE SERPL-CCNC: 103 U/L (ref 28–100)
ANION GAP SERPL CALCULATED.3IONS-SCNC: 6.9 MMOL/L (ref 3.6–11.2)
ANION GAP SERPL CALCULATED.3IONS-SCNC: 9.3 MMOL/L (ref 3.6–11.2)
AST SERPL-CCNC: 12 U/L (ref 10–30)
BASOPHILS # BLD AUTO: 0.05 10*3/MM3 (ref 0–0.3)
BASOPHILS # BLD AUTO: 0.06 10*3/MM3 (ref 0–0.3)
BASOPHILS NFR BLD AUTO: 0.3 % (ref 0–2)
BASOPHILS NFR BLD AUTO: 0.4 % (ref 0–2)
BILIRUB SERPL-MCNC: 0.4 MG/DL (ref 0.2–1.8)
BILIRUB UR QL STRIP: NEGATIVE
BNP SERPL-MCNC: 34 PG/ML (ref 0–100)
BUN BLD-MCNC: 32 MG/DL (ref 7–21)
BUN BLD-MCNC: 33 MG/DL (ref 7–21)
BUN/CREAT SERPL: 19.2 (ref 7–25)
BUN/CREAT SERPL: 22.4 (ref 7–25)
CALCIUM SPEC-SCNC: 9.3 MG/DL (ref 7.7–10)
CALCIUM SPEC-SCNC: 9.8 MG/DL (ref 7.7–10)
CHLORIDE SERPL-SCNC: 112 MMOL/L (ref 99–112)
CHLORIDE SERPL-SCNC: 114 MMOL/L (ref 99–112)
CK MB SERPL-CCNC: 0.42 NG/ML (ref 0–5)
CK MB SERPL-RTO: 2.3 % (ref 0–3)
CK SERPL-CCNC: 18 U/L (ref 24–173)
CLARITY UR: ABNORMAL
CO2 SERPL-SCNC: 20.1 MMOL/L (ref 24.3–31.9)
CO2 SERPL-SCNC: 20.7 MMOL/L (ref 24.3–31.9)
COLOR UR: YELLOW
CREAT BLD-MCNC: 1.47 MG/DL (ref 0.43–1.29)
CREAT BLD-MCNC: 1.67 MG/DL (ref 0.43–1.29)
CRP SERPL-MCNC: <0.5 MG/DL (ref 0–0.99)
D-LACTATE SERPL-SCNC: 1.9 MMOL/L (ref 0.5–2)
DEPRECATED RDW RBC AUTO: 43.3 FL (ref 37–54)
DEPRECATED RDW RBC AUTO: 44.4 FL (ref 37–54)
EOSINOPHIL # BLD AUTO: 0.15 10*3/MM3 (ref 0–0.7)
EOSINOPHIL # BLD AUTO: 0.17 10*3/MM3 (ref 0–0.7)
EOSINOPHIL NFR BLD AUTO: 0.8 % (ref 0–7)
EOSINOPHIL NFR BLD AUTO: 1.1 % (ref 0–7)
ERYTHROCYTE [DISTWIDTH] IN BLOOD BY AUTOMATED COUNT: 13.7 % (ref 11.5–14.5)
ERYTHROCYTE [DISTWIDTH] IN BLOOD BY AUTOMATED COUNT: 13.9 % (ref 11.5–14.5)
GFR SERPL CREATININE-BSD FRML MDRD: 30 ML/MIN/1.73
GFR SERPL CREATININE-BSD FRML MDRD: 35 ML/MIN/1.73
GLOBULIN UR ELPH-MCNC: 3.2 GM/DL
GLUCOSE BLD-MCNC: 129 MG/DL (ref 70–110)
GLUCOSE BLD-MCNC: 134 MG/DL (ref 70–110)
GLUCOSE BLDC GLUCOMTR-MCNC: 121 MG/DL (ref 70–130)
GLUCOSE BLDC GLUCOMTR-MCNC: 130 MG/DL (ref 70–130)
GLUCOSE UR STRIP-MCNC: NEGATIVE MG/DL
HCT VFR BLD AUTO: 38.5 % (ref 37–47)
HCT VFR BLD AUTO: 41.6 % (ref 37–47)
HGB BLD-MCNC: 12.3 G/DL (ref 12–16)
HGB BLD-MCNC: 13.3 G/DL (ref 12–16)
HGB UR QL STRIP.AUTO: NEGATIVE
IMM GRANULOCYTES # BLD: 0.08 10*3/MM3 (ref 0–0.03)
IMM GRANULOCYTES # BLD: 0.14 10*3/MM3 (ref 0–0.03)
IMM GRANULOCYTES NFR BLD: 0.6 % (ref 0–0.5)
IMM GRANULOCYTES NFR BLD: 0.7 % (ref 0–0.5)
KETONES UR QL STRIP: NEGATIVE
LEUKOCYTE ESTERASE UR QL STRIP.AUTO: NEGATIVE
LIPASE SERPL-CCNC: 58 U/L (ref 13–60)
LYMPHOCYTES # BLD AUTO: 1.55 10*3/MM3 (ref 1–3)
LYMPHOCYTES # BLD AUTO: 2.27 10*3/MM3 (ref 1–3)
LYMPHOCYTES NFR BLD AUTO: 16.9 % (ref 16–46)
LYMPHOCYTES NFR BLD AUTO: 7.5 % (ref 16–46)
MAGNESIUM SERPL-MCNC: 2 MG/DL (ref 1.7–2.6)
MAGNESIUM SERPL-MCNC: 2.2 MG/DL (ref 1.7–2.6)
MCH RBC QN AUTO: 28.3 PG (ref 27–33)
MCH RBC QN AUTO: 28.5 PG (ref 27–33)
MCHC RBC AUTO-ENTMCNC: 31.9 G/DL (ref 33–37)
MCHC RBC AUTO-ENTMCNC: 32 G/DL (ref 33–37)
MCV RBC AUTO: 88.7 FL (ref 80–94)
MCV RBC AUTO: 89.3 FL (ref 80–94)
MONOCYTES # BLD AUTO: 0.75 10*3/MM3 (ref 0.1–0.9)
MONOCYTES # BLD AUTO: 1.42 10*3/MM3 (ref 0.1–0.9)
MONOCYTES NFR BLD AUTO: 5.6 % (ref 0–12)
MONOCYTES NFR BLD AUTO: 6.9 % (ref 0–12)
MYOGLOBIN SERPL-MCNC: 52 NG/ML (ref 0–109)
NEUTROPHILS # BLD AUTO: 10.14 10*3/MM3 (ref 1.4–6.5)
NEUTROPHILS # BLD AUTO: 17.31 10*3/MM3 (ref 1.4–6.5)
NEUTROPHILS NFR BLD AUTO: 75.4 % (ref 40–75)
NEUTROPHILS NFR BLD AUTO: 83.8 % (ref 40–75)
NITRITE UR QL STRIP: NEGATIVE
OSMOLALITY SERPL CALC.SUM OF ELEC: 289.9 MOSM/KG (ref 273–305)
OSMOLALITY SERPL CALC.SUM OF ELEC: 292.4 MOSM/KG (ref 273–305)
PH UR STRIP.AUTO: <=5 [PH] (ref 5–8)
PLATELET # BLD AUTO: 290 10*3/MM3 (ref 130–400)
PLATELET # BLD AUTO: 328 10*3/MM3 (ref 130–400)
PMV BLD AUTO: 11.1 FL (ref 6–10)
PMV BLD AUTO: 11.4 FL (ref 6–10)
POTASSIUM BLD-SCNC: 4.6 MMOL/L (ref 3.5–5.3)
POTASSIUM BLD-SCNC: 4.8 MMOL/L (ref 3.5–5.3)
PROT SERPL-MCNC: 7.3 G/DL (ref 6–8)
PROT UR QL STRIP: NEGATIVE
RBC # BLD AUTO: 4.34 10*6/MM3 (ref 4.2–5.4)
RBC # BLD AUTO: 4.66 10*6/MM3 (ref 4.2–5.4)
SODIUM BLD-SCNC: 141 MMOL/L (ref 135–153)
SODIUM BLD-SCNC: 142 MMOL/L (ref 135–153)
SP GR UR STRIP: 1.01 (ref 1–1.03)
TROPONIN I SERPL-MCNC: <0.006 NG/ML
TROPONIN I SERPL-MCNC: <0.006 NG/ML
TSH SERPL DL<=0.05 MIU/L-ACNC: 4.14 MIU/ML (ref 0.55–4.78)
UROBILINOGEN UR QL STRIP: ABNORMAL
WBC NRBC COR # BLD: 13.44 10*3/MM3 (ref 4.5–12.5)
WBC NRBC COR # BLD: 20.65 10*3/MM3 (ref 4.5–12.5)

## 2018-06-24 PROCEDURE — 25010000002 HEPARIN (PORCINE) PER 1000 UNITS: Performed by: INTERNAL MEDICINE

## 2018-06-24 PROCEDURE — 82962 GLUCOSE BLOOD TEST: CPT

## 2018-06-24 PROCEDURE — 83605 ASSAY OF LACTIC ACID: CPT | Performed by: PHYSICIAN ASSISTANT

## 2018-06-24 PROCEDURE — 81003 URINALYSIS AUTO W/O SCOPE: CPT | Performed by: PHYSICIAN ASSISTANT

## 2018-06-24 PROCEDURE — 80053 COMPREHEN METABOLIC PANEL: CPT | Performed by: PHYSICIAN ASSISTANT

## 2018-06-24 PROCEDURE — 70450 CT HEAD/BRAIN W/O DYE: CPT | Performed by: RADIOLOGY

## 2018-06-24 PROCEDURE — 99284 EMERGENCY DEPT VISIT MOD MDM: CPT

## 2018-06-24 PROCEDURE — 83690 ASSAY OF LIPASE: CPT | Performed by: PHYSICIAN ASSISTANT

## 2018-06-24 PROCEDURE — 82150 ASSAY OF AMYLASE: CPT | Performed by: PHYSICIAN ASSISTANT

## 2018-06-24 PROCEDURE — 70450 CT HEAD/BRAIN W/O DYE: CPT

## 2018-06-24 PROCEDURE — 87040 BLOOD CULTURE FOR BACTERIA: CPT | Performed by: PHYSICIAN ASSISTANT

## 2018-06-24 PROCEDURE — 94799 UNLISTED PULMONARY SVC/PX: CPT

## 2018-06-24 PROCEDURE — 83880 ASSAY OF NATRIURETIC PEPTIDE: CPT | Performed by: PHYSICIAN ASSISTANT

## 2018-06-24 PROCEDURE — 71045 X-RAY EXAM CHEST 1 VIEW: CPT | Performed by: RADIOLOGY

## 2018-06-24 PROCEDURE — 71045 X-RAY EXAM CHEST 1 VIEW: CPT

## 2018-06-24 PROCEDURE — 82553 CREATINE MB FRACTION: CPT | Performed by: INTERNAL MEDICINE

## 2018-06-24 PROCEDURE — 93010 ELECTROCARDIOGRAM REPORT: CPT | Performed by: INTERNAL MEDICINE

## 2018-06-24 PROCEDURE — 87040 BLOOD CULTURE FOR BACTERIA: CPT | Performed by: INTERNAL MEDICINE

## 2018-06-24 PROCEDURE — 83735 ASSAY OF MAGNESIUM: CPT | Performed by: PHYSICIAN ASSISTANT

## 2018-06-24 PROCEDURE — 93005 ELECTROCARDIOGRAM TRACING: CPT | Performed by: PHYSICIAN ASSISTANT

## 2018-06-24 PROCEDURE — 82550 ASSAY OF CK (CPK): CPT | Performed by: INTERNAL MEDICINE

## 2018-06-24 PROCEDURE — 94640 AIRWAY INHALATION TREATMENT: CPT

## 2018-06-24 PROCEDURE — G0378 HOSPITAL OBSERVATION PER HR: HCPCS

## 2018-06-24 PROCEDURE — 84443 ASSAY THYROID STIM HORMONE: CPT | Performed by: INTERNAL MEDICINE

## 2018-06-24 PROCEDURE — 99223 1ST HOSP IP/OBS HIGH 75: CPT | Performed by: INTERNAL MEDICINE

## 2018-06-24 PROCEDURE — 83874 ASSAY OF MYOGLOBIN: CPT | Performed by: INTERNAL MEDICINE

## 2018-06-24 PROCEDURE — 83735 ASSAY OF MAGNESIUM: CPT | Performed by: INTERNAL MEDICINE

## 2018-06-24 PROCEDURE — 85025 COMPLETE CBC W/AUTO DIFF WBC: CPT | Performed by: PHYSICIAN ASSISTANT

## 2018-06-24 PROCEDURE — 36415 COLL VENOUS BLD VENIPUNCTURE: CPT

## 2018-06-24 PROCEDURE — 86140 C-REACTIVE PROTEIN: CPT | Performed by: PHYSICIAN ASSISTANT

## 2018-06-24 PROCEDURE — 85025 COMPLETE CBC W/AUTO DIFF WBC: CPT | Performed by: INTERNAL MEDICINE

## 2018-06-24 PROCEDURE — 84484 ASSAY OF TROPONIN QUANT: CPT | Performed by: INTERNAL MEDICINE

## 2018-06-24 PROCEDURE — 84484 ASSAY OF TROPONIN QUANT: CPT | Performed by: PHYSICIAN ASSISTANT

## 2018-06-24 RX ORDER — NYSTATIN 100000 [USP'U]/G
1 POWDER TOPICAL 3 TIMES DAILY
Status: DISCONTINUED | OUTPATIENT
Start: 2018-06-24 | End: 2018-06-27 | Stop reason: HOSPADM

## 2018-06-24 RX ORDER — IPRATROPIUM BROMIDE AND ALBUTEROL SULFATE 2.5; .5 MG/3ML; MG/3ML
3 SOLUTION RESPIRATORY (INHALATION) ONCE
Status: COMPLETED | OUTPATIENT
Start: 2018-06-24 | End: 2018-06-24

## 2018-06-24 RX ORDER — FERROUS SULFATE 325(65) MG
325 TABLET ORAL 2 TIMES DAILY WITH MEALS
Status: DISCONTINUED | OUTPATIENT
Start: 2018-06-25 | End: 2018-06-27 | Stop reason: HOSPADM

## 2018-06-24 RX ORDER — DEXTROSE MONOHYDRATE 25 G/50ML
25 INJECTION, SOLUTION INTRAVENOUS
Status: DISCONTINUED | OUTPATIENT
Start: 2018-06-24 | End: 2018-06-27 | Stop reason: HOSPADM

## 2018-06-24 RX ORDER — BUDESONIDE AND FORMOTEROL FUMARATE DIHYDRATE 80; 4.5 UG/1; UG/1
2 AEROSOL RESPIRATORY (INHALATION)
Status: DISCONTINUED | OUTPATIENT
Start: 2018-06-25 | End: 2018-06-27 | Stop reason: HOSPADM

## 2018-06-24 RX ORDER — SODIUM CHLORIDE 0.9 % (FLUSH) 0.9 %
10 SYRINGE (ML) INJECTION AS NEEDED
Status: DISCONTINUED | OUTPATIENT
Start: 2018-06-24 | End: 2018-06-27 | Stop reason: HOSPADM

## 2018-06-24 RX ORDER — DEXTROSE, SODIUM CHLORIDE, AND POTASSIUM CHLORIDE 5; .45; .15 G/100ML; G/100ML; G/100ML
100 INJECTION INTRAVENOUS CONTINUOUS
Status: DISCONTINUED | OUTPATIENT
Start: 2018-06-24 | End: 2018-06-24

## 2018-06-24 RX ORDER — ONDANSETRON 2 MG/ML
4 INJECTION INTRAMUSCULAR; INTRAVENOUS EVERY 6 HOURS PRN
Status: DISCONTINUED | OUTPATIENT
Start: 2018-06-24 | End: 2018-06-27 | Stop reason: HOSPADM

## 2018-06-24 RX ORDER — POTASSIUM CHLORIDE 20 MEQ/1
20 TABLET, EXTENDED RELEASE ORAL DAILY
Status: DISCONTINUED | OUTPATIENT
Start: 2018-06-25 | End: 2018-06-24

## 2018-06-24 RX ORDER — BISACODYL 10 MG
10 SUPPOSITORY, RECTAL RECTAL DAILY PRN
Status: DISCONTINUED | OUTPATIENT
Start: 2018-06-24 | End: 2018-06-27 | Stop reason: HOSPADM

## 2018-06-24 RX ORDER — SENNA AND DOCUSATE SODIUM 50; 8.6 MG/1; MG/1
2 TABLET, FILM COATED ORAL NIGHTLY
Status: DISCONTINUED | OUTPATIENT
Start: 2018-06-24 | End: 2018-06-27 | Stop reason: HOSPADM

## 2018-06-24 RX ORDER — ACETAMINOPHEN 325 MG/1
650 TABLET ORAL EVERY 4 HOURS PRN
Status: DISCONTINUED | OUTPATIENT
Start: 2018-06-24 | End: 2018-06-27 | Stop reason: HOSPADM

## 2018-06-24 RX ORDER — HEPARIN SODIUM 5000 [USP'U]/ML
5000 INJECTION, SOLUTION INTRAVENOUS; SUBCUTANEOUS EVERY 12 HOURS SCHEDULED
Status: DISCONTINUED | OUTPATIENT
Start: 2018-06-24 | End: 2018-06-25

## 2018-06-24 RX ORDER — BISACODYL 5 MG/1
5 TABLET, DELAYED RELEASE ORAL DAILY PRN
Status: DISCONTINUED | OUTPATIENT
Start: 2018-06-24 | End: 2018-06-27 | Stop reason: HOSPADM

## 2018-06-24 RX ORDER — SODIUM CHLORIDE 9 MG/ML
50 INJECTION, SOLUTION INTRAVENOUS CONTINUOUS
Status: DISPENSED | OUTPATIENT
Start: 2018-06-24 | End: 2018-06-25

## 2018-06-24 RX ORDER — GABAPENTIN 300 MG/1
300 CAPSULE ORAL NIGHTLY
Status: DISCONTINUED | OUTPATIENT
Start: 2018-06-24 | End: 2018-06-27 | Stop reason: HOSPADM

## 2018-06-24 RX ORDER — PANTOPRAZOLE SODIUM 40 MG/1
40 TABLET, DELAYED RELEASE ORAL EVERY MORNING
Status: DISCONTINUED | OUTPATIENT
Start: 2018-06-25 | End: 2018-06-27 | Stop reason: HOSPADM

## 2018-06-24 RX ORDER — FAMOTIDINE 20 MG/1
40 TABLET, FILM COATED ORAL DAILY
Status: DISCONTINUED | OUTPATIENT
Start: 2018-06-24 | End: 2018-06-27 | Stop reason: HOSPADM

## 2018-06-24 RX ORDER — LISINOPRIL 2.5 MG/1
5 TABLET ORAL DAILY
Status: CANCELLED | OUTPATIENT
Start: 2018-06-24

## 2018-06-24 RX ORDER — ATENOLOL 25 MG/1
25 TABLET ORAL DAILY
Status: CANCELLED | OUTPATIENT
Start: 2018-06-25

## 2018-06-24 RX ORDER — ALUMINA, MAGNESIA, AND SIMETHICONE 2400; 2400; 240 MG/30ML; MG/30ML; MG/30ML
15 SUSPENSION ORAL EVERY 6 HOURS PRN
Status: DISCONTINUED | OUTPATIENT
Start: 2018-06-24 | End: 2018-06-27 | Stop reason: HOSPADM

## 2018-06-24 RX ORDER — SODIUM CHLORIDE 0.9 % (FLUSH) 0.9 %
1-10 SYRINGE (ML) INJECTION AS NEEDED
Status: DISCONTINUED | OUTPATIENT
Start: 2018-06-24 | End: 2018-06-27 | Stop reason: HOSPADM

## 2018-06-24 RX ORDER — BUDESONIDE AND FORMOTEROL FUMARATE DIHYDRATE 80; 4.5 UG/1; UG/1
2 AEROSOL RESPIRATORY (INHALATION)
Status: CANCELLED | OUTPATIENT
Start: 2018-06-24

## 2018-06-24 RX ORDER — FUROSEMIDE 20 MG/1
20 TABLET ORAL DAILY
Status: CANCELLED | OUTPATIENT
Start: 2018-06-24

## 2018-06-24 RX ORDER — NITROGLYCERIN 0.4 MG/1
0.4 TABLET SUBLINGUAL
Status: DISCONTINUED | OUTPATIENT
Start: 2018-06-24 | End: 2018-06-27 | Stop reason: HOSPADM

## 2018-06-24 RX ORDER — TRAZODONE HYDROCHLORIDE 50 MG/1
50 TABLET ORAL NIGHTLY
Status: CANCELLED | OUTPATIENT
Start: 2018-06-24

## 2018-06-24 RX ORDER — ATORVASTATIN CALCIUM 20 MG/1
20 TABLET, FILM COATED ORAL DAILY
Status: DISCONTINUED | OUTPATIENT
Start: 2018-06-25 | End: 2018-06-27 | Stop reason: HOSPADM

## 2018-06-24 RX ORDER — NICOTINE POLACRILEX 4 MG
15 LOZENGE BUCCAL
Status: DISCONTINUED | OUTPATIENT
Start: 2018-06-24 | End: 2018-06-27 | Stop reason: HOSPADM

## 2018-06-24 RX ORDER — IPRATROPIUM BROMIDE AND ALBUTEROL SULFATE 2.5; .5 MG/3ML; MG/3ML
3 SOLUTION RESPIRATORY (INHALATION) EVERY 4 HOURS PRN
Status: DISCONTINUED | OUTPATIENT
Start: 2018-06-24 | End: 2018-06-27 | Stop reason: HOSPADM

## 2018-06-24 RX ADMIN — NYSTATIN 1 APPLICATION: 100000 POWDER TOPICAL at 22:07

## 2018-06-24 RX ADMIN — FAMOTIDINE 40 MG: 20 TABLET, FILM COATED ORAL at 22:06

## 2018-06-24 RX ADMIN — HEPARIN SODIUM 5000 UNITS: 5000 INJECTION, SOLUTION INTRAVENOUS; SUBCUTANEOUS at 22:07

## 2018-06-24 RX ADMIN — SODIUM CHLORIDE 1000 ML: 9 INJECTION, SOLUTION INTRAVENOUS at 15:04

## 2018-06-24 RX ADMIN — DOCUSATE SODIUM AND SENNOSIDES 2 TABLET: 8.6; 5 TABLET, FILM COATED ORAL at 22:06

## 2018-06-24 RX ADMIN — SODIUM CHLORIDE 50 ML/HR: 9 INJECTION, SOLUTION INTRAVENOUS at 22:07

## 2018-06-24 RX ADMIN — GABAPENTIN 300 MG: 300 CAPSULE ORAL at 22:06

## 2018-06-24 RX ADMIN — IPRATROPIUM BROMIDE AND ALBUTEROL SULFATE 3 ML: .5; 3 SOLUTION RESPIRATORY (INHALATION) at 15:09

## 2018-06-25 ENCOUNTER — APPOINTMENT (OUTPATIENT)
Dept: CARDIOLOGY | Facility: HOSPITAL | Age: 73
End: 2018-06-25
Attending: INTERNAL MEDICINE

## 2018-06-25 ENCOUNTER — APPOINTMENT (OUTPATIENT)
Dept: ULTRASOUND IMAGING | Facility: HOSPITAL | Age: 73
End: 2018-06-25
Attending: INTERNAL MEDICINE

## 2018-06-25 LAB
ANION GAP SERPL CALCULATED.3IONS-SCNC: 5.5 MMOL/L (ref 3.6–11.2)
BASOPHILS # BLD AUTO: 0.05 10*3/MM3 (ref 0–0.3)
BASOPHILS NFR BLD AUTO: 0.5 % (ref 0–2)
BH CV ECHO MEAS - % IVS THICK: 31.7 %
BH CV ECHO MEAS - % LVPW THICK: 97.4 %
BH CV ECHO MEAS - ACS: 2 CM
BH CV ECHO MEAS - AO MAX PG: 10.3 MMHG
BH CV ECHO MEAS - AO MEAN PG: 5 MMHG
BH CV ECHO MEAS - AO ROOT AREA (BSA CORRECTED): 1.6
BH CV ECHO MEAS - AO ROOT AREA: 8.9 CM^2
BH CV ECHO MEAS - AO ROOT DIAM: 3.4 CM
BH CV ECHO MEAS - AO V2 MAX: 160.2 CM/SEC
BH CV ECHO MEAS - AO V2 MEAN: 102.1 CM/SEC
BH CV ECHO MEAS - AO V2 VTI: 30.2 CM
BH CV ECHO MEAS - BSA(HAYCOCK): 2.2 M^2
BH CV ECHO MEAS - BSA: 2.1 M^2
BH CV ECHO MEAS - BZI_BMI: 32.7 KILOGRAMS/M^2
BH CV ECHO MEAS - BZI_METRIC_HEIGHT: 172.7 CM
BH CV ECHO MEAS - BZI_METRIC_WEIGHT: 97.5 KG
BH CV ECHO MEAS - CONTRAST EF 4CH: 62.7 ML/M^2
BH CV ECHO MEAS - EDV(CUBED): 152.4 ML
BH CV ECHO MEAS - EDV(MOD-SP4): 59 ML
BH CV ECHO MEAS - EDV(TEICH): 137.8 ML
BH CV ECHO MEAS - EF(CUBED): 72.8 %
BH CV ECHO MEAS - EF(MOD-SP4): 62.7 %
BH CV ECHO MEAS - EF(TEICH): 64.1 %
BH CV ECHO MEAS - ESV(CUBED): 41.4 ML
BH CV ECHO MEAS - ESV(MOD-SP4): 22 ML
BH CV ECHO MEAS - ESV(TEICH): 49.5 ML
BH CV ECHO MEAS - FS: 35.2 %
BH CV ECHO MEAS - IVS/LVPW: 1.3
BH CV ECHO MEAS - IVSD: 1 CM
BH CV ECHO MEAS - IVSS: 1.3 CM
BH CV ECHO MEAS - LA DIMENSION: 3.8 CM
BH CV ECHO MEAS - LA/AO: 1.1
BH CV ECHO MEAS - LV DIASTOLIC VOL/BSA (35-75): 28 ML/M^2
BH CV ECHO MEAS - LV MASS(C)D: 175.2 GRAMS
BH CV ECHO MEAS - LV MASS(C)DI: 83.1 GRAMS/M^2
BH CV ECHO MEAS - LV MASS(C)S: 177.6 GRAMS
BH CV ECHO MEAS - LV MASS(C)SI: 84.3 GRAMS/M^2
BH CV ECHO MEAS - LV SYSTOLIC VOL/BSA (12-30): 10.4 ML/M^2
BH CV ECHO MEAS - LVIDD: 5.3 CM
BH CV ECHO MEAS - LVIDS: 3.5 CM
BH CV ECHO MEAS - LVLD AP4: 7.6 CM
BH CV ECHO MEAS - LVLS AP4: 6 CM
BH CV ECHO MEAS - LVOT AREA (M): 3.1 CM^2
BH CV ECHO MEAS - LVOT AREA: 3.1 CM^2
BH CV ECHO MEAS - LVOT DIAM: 2 CM
BH CV ECHO MEAS - LVPWD: 0.79 CM
BH CV ECHO MEAS - LVPWS: 1.6 CM
BH CV ECHO MEAS - MV A MAX VEL: 66.1 CM/SEC
BH CV ECHO MEAS - MV E MAX VEL: 59.2 CM/SEC
BH CV ECHO MEAS - MV E/A: 0.9
BH CV ECHO MEAS - PA ACC SLOPE: 1456 CM/SEC^2
BH CV ECHO MEAS - PA ACC TIME: 0.08 SEC
BH CV ECHO MEAS - PA PR(ACCEL): 41 MMHG
BH CV ECHO MEAS - RAP SYSTOLE: 10 MMHG
BH CV ECHO MEAS - RVDD: 3.4 CM
BH CV ECHO MEAS - RVSP: 33.4 MMHG
BH CV ECHO MEAS - SI(AO): 126.9 ML/M^2
BH CV ECHO MEAS - SI(CUBED): 52.7 ML/M^2
BH CV ECHO MEAS - SI(MOD-SP4): 17.6 ML/M^2
BH CV ECHO MEAS - SI(TEICH): 41.9 ML/M^2
BH CV ECHO MEAS - SV(AO): 267.4 ML
BH CV ECHO MEAS - SV(CUBED): 111 ML
BH CV ECHO MEAS - SV(MOD-SP4): 37 ML
BH CV ECHO MEAS - SV(TEICH): 88.3 ML
BH CV ECHO MEAS - TR MAX VEL: 241.6 CM/SEC
BUN BLD-MCNC: 28 MG/DL (ref 7–21)
BUN/CREAT SERPL: 24.1 (ref 7–25)
CALCIUM SPEC-SCNC: 9 MG/DL (ref 7.7–10)
CHLORIDE SERPL-SCNC: 113 MMOL/L (ref 99–112)
CK MB SERPL-CCNC: 0.2 NG/ML (ref 0–5)
CK MB SERPL-CCNC: 0.21 NG/ML (ref 0–5)
CK MB SERPL-RTO: 1.3 % (ref 0–3)
CK MB SERPL-RTO: 1.5 % (ref 0–3)
CK SERPL-CCNC: 14 U/L (ref 24–173)
CK SERPL-CCNC: 15 U/L (ref 24–173)
CO2 SERPL-SCNC: 22.5 MMOL/L (ref 24.3–31.9)
CREAT BLD-MCNC: 1.16 MG/DL (ref 0.43–1.29)
DEPRECATED RDW RBC AUTO: 44.4 FL (ref 37–54)
EOSINOPHIL # BLD AUTO: 0.2 10*3/MM3 (ref 0–0.7)
EOSINOPHIL NFR BLD AUTO: 2.2 % (ref 0–7)
ERYTHROCYTE [DISTWIDTH] IN BLOOD BY AUTOMATED COUNT: 13.8 % (ref 11.5–14.5)
GFR SERPL CREATININE-BSD FRML MDRD: 46 ML/MIN/1.73
GLUCOSE BLD-MCNC: 135 MG/DL (ref 70–110)
GLUCOSE BLDC GLUCOMTR-MCNC: 114 MG/DL (ref 70–130)
GLUCOSE BLDC GLUCOMTR-MCNC: 116 MG/DL (ref 70–130)
GLUCOSE BLDC GLUCOMTR-MCNC: 127 MG/DL (ref 70–130)
GLUCOSE BLDC GLUCOMTR-MCNC: 178 MG/DL (ref 70–130)
HCT VFR BLD AUTO: 37.1 % (ref 37–47)
HGB BLD-MCNC: 11.6 G/DL (ref 12–16)
IMM GRANULOCYTES # BLD: 0.05 10*3/MM3 (ref 0–0.03)
IMM GRANULOCYTES NFR BLD: 0.5 % (ref 0–0.5)
LV EF 2D ECHO EST: 60 %
LYMPHOCYTES # BLD AUTO: 2.54 10*3/MM3 (ref 1–3)
LYMPHOCYTES NFR BLD AUTO: 27.6 % (ref 16–46)
MAGNESIUM SERPL-MCNC: 1.9 MG/DL (ref 1.7–2.6)
MAXIMAL PREDICTED HEART RATE: 149 BPM
MCH RBC QN AUTO: 28.2 PG (ref 27–33)
MCHC RBC AUTO-ENTMCNC: 31.3 G/DL (ref 33–37)
MCV RBC AUTO: 90 FL (ref 80–94)
MONOCYTES # BLD AUTO: 0.62 10*3/MM3 (ref 0.1–0.9)
MONOCYTES NFR BLD AUTO: 6.7 % (ref 0–12)
MYOGLOBIN SERPL-MCNC: 40 NG/ML (ref 0–109)
MYOGLOBIN SERPL-MCNC: 41 NG/ML (ref 0–109)
NEUTROPHILS # BLD AUTO: 5.75 10*3/MM3 (ref 1.4–6.5)
NEUTROPHILS NFR BLD AUTO: 62.5 % (ref 40–75)
OSMOLALITY SERPL CALC.SUM OF ELEC: 288.8 MOSM/KG (ref 273–305)
PLATELET # BLD AUTO: 259 10*3/MM3 (ref 130–400)
PMV BLD AUTO: 11.1 FL (ref 6–10)
POTASSIUM BLD-SCNC: 4.3 MMOL/L (ref 3.5–5.3)
RBC # BLD AUTO: 4.12 10*6/MM3 (ref 4.2–5.4)
SODIUM BLD-SCNC: 141 MMOL/L (ref 135–153)
STRESS TARGET HR: 127 BPM
TROPONIN I SERPL-MCNC: 0.01 NG/ML
TROPONIN I SERPL-MCNC: <0.006 NG/ML
WBC NRBC COR # BLD: 9.21 10*3/MM3 (ref 4.5–12.5)

## 2018-06-25 PROCEDURE — 82550 ASSAY OF CK (CPK): CPT | Performed by: INTERNAL MEDICINE

## 2018-06-25 PROCEDURE — G0378 HOSPITAL OBSERVATION PER HR: HCPCS

## 2018-06-25 PROCEDURE — 82553 CREATINE MB FRACTION: CPT | Performed by: INTERNAL MEDICINE

## 2018-06-25 PROCEDURE — 80048 BASIC METABOLIC PNL TOTAL CA: CPT | Performed by: INTERNAL MEDICINE

## 2018-06-25 PROCEDURE — 25010000002 HEPARIN (PORCINE) PER 1000 UNITS: Performed by: INTERNAL MEDICINE

## 2018-06-25 PROCEDURE — 84484 ASSAY OF TROPONIN QUANT: CPT | Performed by: INTERNAL MEDICINE

## 2018-06-25 PROCEDURE — 94799 UNLISTED PULMONARY SVC/PX: CPT

## 2018-06-25 PROCEDURE — 82962 GLUCOSE BLOOD TEST: CPT

## 2018-06-25 PROCEDURE — 93010 ELECTROCARDIOGRAM REPORT: CPT | Performed by: INTERNAL MEDICINE

## 2018-06-25 PROCEDURE — 93306 TTE W/DOPPLER COMPLETE: CPT

## 2018-06-25 PROCEDURE — 93880 EXTRACRANIAL BILAT STUDY: CPT | Performed by: RADIOLOGY

## 2018-06-25 PROCEDURE — 83735 ASSAY OF MAGNESIUM: CPT | Performed by: INTERNAL MEDICINE

## 2018-06-25 PROCEDURE — 85025 COMPLETE CBC W/AUTO DIFF WBC: CPT | Performed by: INTERNAL MEDICINE

## 2018-06-25 PROCEDURE — 93005 ELECTROCARDIOGRAM TRACING: CPT | Performed by: INTERNAL MEDICINE

## 2018-06-25 PROCEDURE — 99232 SBSQ HOSP IP/OBS MODERATE 35: CPT | Performed by: INTERNAL MEDICINE

## 2018-06-25 PROCEDURE — 83874 ASSAY OF MYOGLOBIN: CPT | Performed by: INTERNAL MEDICINE

## 2018-06-25 PROCEDURE — 93306 TTE W/DOPPLER COMPLETE: CPT | Performed by: INTERNAL MEDICINE

## 2018-06-25 PROCEDURE — 93880 EXTRACRANIAL BILAT STUDY: CPT

## 2018-06-25 RX ORDER — NICOTINE POLACRILEX 4 MG
15 LOZENGE BUCCAL
Status: DISCONTINUED | OUTPATIENT
Start: 2018-06-25 | End: 2018-06-27 | Stop reason: HOSPADM

## 2018-06-25 RX ORDER — DEXTROSE MONOHYDRATE 25 G/50ML
25 INJECTION, SOLUTION INTRAVENOUS
Status: DISCONTINUED | OUTPATIENT
Start: 2018-06-25 | End: 2018-06-27 | Stop reason: HOSPADM

## 2018-06-25 RX ADMIN — BUDESONIDE AND FORMOTEROL FUMARATE DIHYDRATE 2 PUFF: 80; 4.5 AEROSOL RESPIRATORY (INHALATION) at 18:34

## 2018-06-25 RX ADMIN — HEPARIN SODIUM 5000 UNITS: 5000 INJECTION, SOLUTION INTRAVENOUS; SUBCUTANEOUS at 07:57

## 2018-06-25 RX ADMIN — NYSTATIN 1 APPLICATION: 100000 POWDER TOPICAL at 21:49

## 2018-06-25 RX ADMIN — BUDESONIDE AND FORMOTEROL FUMARATE DIHYDRATE 2 PUFF: 80; 4.5 AEROSOL RESPIRATORY (INHALATION) at 06:29

## 2018-06-25 RX ADMIN — FERROUS SULFATE TAB 325 MG (65 MG ELEMENTAL FE) 325 MG: 325 (65 FE) TAB at 17:33

## 2018-06-25 RX ADMIN — FERROUS SULFATE TAB 325 MG (65 MG ELEMENTAL FE) 325 MG: 325 (65 FE) TAB at 07:58

## 2018-06-25 RX ADMIN — NYSTATIN 1 APPLICATION: 100000 POWDER TOPICAL at 07:58

## 2018-06-25 RX ADMIN — ATORVASTATIN CALCIUM 20 MG: 20 TABLET, FILM COATED ORAL at 07:58

## 2018-06-25 RX ADMIN — GABAPENTIN 300 MG: 300 CAPSULE ORAL at 21:49

## 2018-06-25 RX ADMIN — NYSTATIN 1 APPLICATION: 100000 POWDER TOPICAL at 17:33

## 2018-06-25 RX ADMIN — FAMOTIDINE 40 MG: 20 TABLET, FILM COATED ORAL at 07:57

## 2018-06-25 RX ADMIN — DOCUSATE SODIUM AND SENNOSIDES 2 TABLET: 8.6; 5 TABLET, FILM COATED ORAL at 21:49

## 2018-06-25 RX ADMIN — HEPARIN SODIUM 5000 UNITS: 5000 INJECTION, SOLUTION INTRAVENOUS; SUBCUTANEOUS at 21:49

## 2018-06-25 RX ADMIN — PANTOPRAZOLE SODIUM 40 MG: 40 TABLET, DELAYED RELEASE ORAL at 05:59

## 2018-06-26 LAB
ANION GAP SERPL CALCULATED.3IONS-SCNC: 8 MMOL/L (ref 3.6–11.2)
BASOPHILS # BLD AUTO: 0.04 10*3/MM3 (ref 0–0.3)
BASOPHILS NFR BLD AUTO: 0.4 % (ref 0–2)
BUN BLD-MCNC: 17 MG/DL (ref 7–21)
BUN/CREAT SERPL: 15.9 (ref 7–25)
CALCIUM SPEC-SCNC: 9.2 MG/DL (ref 7.7–10)
CHLORIDE SERPL-SCNC: 112 MMOL/L (ref 99–112)
CO2 SERPL-SCNC: 20 MMOL/L (ref 24.3–31.9)
CREAT BLD-MCNC: 1.07 MG/DL (ref 0.43–1.29)
DEPRECATED RDW RBC AUTO: 43.8 FL (ref 37–54)
EOSINOPHIL # BLD AUTO: 0.25 10*3/MM3 (ref 0–0.7)
EOSINOPHIL NFR BLD AUTO: 2.8 % (ref 0–7)
ERYTHROCYTE [DISTWIDTH] IN BLOOD BY AUTOMATED COUNT: 13.8 % (ref 11.5–14.5)
GFR SERPL CREATININE-BSD FRML MDRD: 51 ML/MIN/1.73
GLUCOSE BLD-MCNC: 102 MG/DL (ref 70–110)
GLUCOSE BLDC GLUCOMTR-MCNC: 118 MG/DL (ref 70–130)
GLUCOSE BLDC GLUCOMTR-MCNC: 119 MG/DL (ref 70–130)
GLUCOSE BLDC GLUCOMTR-MCNC: 131 MG/DL (ref 70–130)
GLUCOSE BLDC GLUCOMTR-MCNC: 137 MG/DL (ref 70–130)
HCT VFR BLD AUTO: 38.7 % (ref 37–47)
HGB BLD-MCNC: 12.1 G/DL (ref 12–16)
IMM GRANULOCYTES # BLD: 0.06 10*3/MM3 (ref 0–0.03)
IMM GRANULOCYTES NFR BLD: 0.7 % (ref 0–0.5)
LYMPHOCYTES # BLD AUTO: 2.85 10*3/MM3 (ref 1–3)
LYMPHOCYTES NFR BLD AUTO: 32 % (ref 16–46)
MAGNESIUM SERPL-MCNC: 1.9 MG/DL (ref 1.7–2.6)
MCH RBC QN AUTO: 27.8 PG (ref 27–33)
MCHC RBC AUTO-ENTMCNC: 31.3 G/DL (ref 33–37)
MCV RBC AUTO: 89 FL (ref 80–94)
MONOCYTES # BLD AUTO: 0.5 10*3/MM3 (ref 0.1–0.9)
MONOCYTES NFR BLD AUTO: 5.6 % (ref 0–12)
NEUTROPHILS # BLD AUTO: 5.21 10*3/MM3 (ref 1.4–6.5)
NEUTROPHILS NFR BLD AUTO: 58.5 % (ref 40–75)
OSMOLALITY SERPL CALC.SUM OF ELEC: 281.1 MOSM/KG (ref 273–305)
PLATELET # BLD AUTO: 283 10*3/MM3 (ref 130–400)
PMV BLD AUTO: 11 FL (ref 6–10)
POTASSIUM BLD-SCNC: 4.6 MMOL/L (ref 3.5–5.3)
RBC # BLD AUTO: 4.35 10*6/MM3 (ref 4.2–5.4)
SODIUM BLD-SCNC: 140 MMOL/L (ref 135–153)
WBC NRBC COR # BLD: 8.91 10*3/MM3 (ref 4.5–12.5)

## 2018-06-26 PROCEDURE — 97530 THERAPEUTIC ACTIVITIES: CPT

## 2018-06-26 PROCEDURE — 97116 GAIT TRAINING THERAPY: CPT

## 2018-06-26 PROCEDURE — 25010000002 ENOXAPARIN PER 10 MG: Performed by: HOSPITALIST

## 2018-06-26 PROCEDURE — 99232 SBSQ HOSP IP/OBS MODERATE 35: CPT | Performed by: INTERNAL MEDICINE

## 2018-06-26 PROCEDURE — 25010000002 ENOXAPARIN PER 10 MG: Performed by: INTERNAL MEDICINE

## 2018-06-26 PROCEDURE — 94799 UNLISTED PULMONARY SVC/PX: CPT

## 2018-06-26 PROCEDURE — 97162 PT EVAL MOD COMPLEX 30 MIN: CPT

## 2018-06-26 PROCEDURE — 93005 ELECTROCARDIOGRAM TRACING: CPT | Performed by: INTERNAL MEDICINE

## 2018-06-26 PROCEDURE — 80048 BASIC METABOLIC PNL TOTAL CA: CPT | Performed by: INTERNAL MEDICINE

## 2018-06-26 PROCEDURE — 63710000001 INSULIN ASPART PER 5 UNITS: Performed by: INTERNAL MEDICINE

## 2018-06-26 PROCEDURE — 85025 COMPLETE CBC W/AUTO DIFF WBC: CPT | Performed by: INTERNAL MEDICINE

## 2018-06-26 PROCEDURE — 25010000002 MAGNESIUM SULFATE IN D5W 1G/100ML (PREMIX) 1-5 GM/100ML-% SOLUTION: Performed by: INTERNAL MEDICINE

## 2018-06-26 PROCEDURE — 99222 1ST HOSP IP/OBS MODERATE 55: CPT | Performed by: INTERNAL MEDICINE

## 2018-06-26 PROCEDURE — 83735 ASSAY OF MAGNESIUM: CPT | Performed by: INTERNAL MEDICINE

## 2018-06-26 PROCEDURE — G8978 MOBILITY CURRENT STATUS: HCPCS

## 2018-06-26 PROCEDURE — 93010 ELECTROCARDIOGRAM REPORT: CPT | Performed by: INTERNAL MEDICINE

## 2018-06-26 PROCEDURE — G8979 MOBILITY GOAL STATUS: HCPCS

## 2018-06-26 PROCEDURE — 82962 GLUCOSE BLOOD TEST: CPT

## 2018-06-26 RX ORDER — FLECAINIDE ACETATE 50 MG/1
50 TABLET ORAL EVERY 12 HOURS SCHEDULED
Status: DISCONTINUED | OUTPATIENT
Start: 2018-06-26 | End: 2018-06-27 | Stop reason: HOSPADM

## 2018-06-26 RX ORDER — MAGNESIUM SULFATE 1 G/100ML
1 INJECTION INTRAVENOUS
Status: COMPLETED | OUTPATIENT
Start: 2018-06-26 | End: 2018-06-26

## 2018-06-26 RX ADMIN — MAGNESIUM SULFATE HEPTAHYDRATE 1 G: 1 INJECTION, SOLUTION INTRAVENOUS at 13:27

## 2018-06-26 RX ADMIN — ENOXAPARIN SODIUM 100 MG: 100 INJECTION SUBCUTANEOUS at 00:40

## 2018-06-26 RX ADMIN — ATORVASTATIN CALCIUM 20 MG: 20 TABLET, FILM COATED ORAL at 08:34

## 2018-06-26 RX ADMIN — NYSTATIN 1 APPLICATION: 100000 POWDER TOPICAL at 08:37

## 2018-06-26 RX ADMIN — FLECAINIDE ACETATE 50 MG: 50 TABLET ORAL at 21:19

## 2018-06-26 RX ADMIN — FAMOTIDINE 40 MG: 20 TABLET, FILM COATED ORAL at 08:33

## 2018-06-26 RX ADMIN — FERROUS SULFATE TAB 325 MG (65 MG ELEMENTAL FE) 325 MG: 325 (65 FE) TAB at 17:21

## 2018-06-26 RX ADMIN — APIXABAN 5 MG: 5 TABLET, FILM COATED ORAL at 21:22

## 2018-06-26 RX ADMIN — BUDESONIDE AND FORMOTEROL FUMARATE DIHYDRATE 2 PUFF: 80; 4.5 AEROSOL RESPIRATORY (INHALATION) at 18:05

## 2018-06-26 RX ADMIN — INSULIN ASPART 2 UNITS: 100 INJECTION, SOLUTION INTRAVENOUS; SUBCUTANEOUS at 12:46

## 2018-06-26 RX ADMIN — PANTOPRAZOLE SODIUM 40 MG: 40 TABLET, DELAYED RELEASE ORAL at 05:37

## 2018-06-26 RX ADMIN — FLECAINIDE ACETATE 50 MG: 50 TABLET ORAL at 12:45

## 2018-06-26 RX ADMIN — ENOXAPARIN SODIUM 100 MG: 100 INJECTION SUBCUTANEOUS at 08:35

## 2018-06-26 RX ADMIN — BUDESONIDE AND FORMOTEROL FUMARATE DIHYDRATE 2 PUFF: 80; 4.5 AEROSOL RESPIRATORY (INHALATION) at 06:21

## 2018-06-26 RX ADMIN — NYSTATIN 1 APPLICATION: 100000 POWDER TOPICAL at 16:00

## 2018-06-26 RX ADMIN — GABAPENTIN 300 MG: 300 CAPSULE ORAL at 21:18

## 2018-06-26 RX ADMIN — MAGNESIUM SULFATE HEPTAHYDRATE 1 G: 1 INJECTION, SOLUTION INTRAVENOUS at 15:17

## 2018-06-26 RX ADMIN — METOPROLOL TARTRATE 25 MG: 25 TABLET, FILM COATED ORAL at 21:18

## 2018-06-26 RX ADMIN — NYSTATIN 1 APPLICATION: 100000 POWDER TOPICAL at 21:24

## 2018-06-26 RX ADMIN — FERROUS SULFATE TAB 325 MG (65 MG ELEMENTAL FE) 325 MG: 325 (65 FE) TAB at 08:34

## 2018-06-27 VITALS
RESPIRATION RATE: 18 BRPM | WEIGHT: 215.3 LBS | OXYGEN SATURATION: 96 % | BODY MASS INDEX: 32.63 KG/M2 | DIASTOLIC BLOOD PRESSURE: 82 MMHG | HEART RATE: 68 BPM | HEIGHT: 68 IN | TEMPERATURE: 98.1 F | SYSTOLIC BLOOD PRESSURE: 111 MMHG

## 2018-06-27 LAB
ANION GAP SERPL CALCULATED.3IONS-SCNC: 4.6 MMOL/L (ref 3.6–11.2)
BASOPHILS # BLD AUTO: 0.04 10*3/MM3 (ref 0–0.3)
BASOPHILS NFR BLD AUTO: 0.4 % (ref 0–2)
BUN BLD-MCNC: 17 MG/DL (ref 7–21)
BUN/CREAT SERPL: 16.8 (ref 7–25)
CALCIUM SPEC-SCNC: 8.8 MG/DL (ref 7.7–10)
CHLORIDE SERPL-SCNC: 111 MMOL/L (ref 99–112)
CO2 SERPL-SCNC: 22.4 MMOL/L (ref 24.3–31.9)
CREAT BLD-MCNC: 1.01 MG/DL (ref 0.43–1.29)
DEPRECATED RDW RBC AUTO: 42.9 FL (ref 37–54)
EOSINOPHIL # BLD AUTO: 0.26 10*3/MM3 (ref 0–0.7)
EOSINOPHIL NFR BLD AUTO: 2.8 % (ref 0–7)
ERYTHROCYTE [DISTWIDTH] IN BLOOD BY AUTOMATED COUNT: 13.5 % (ref 11.5–14.5)
GFR SERPL CREATININE-BSD FRML MDRD: 54 ML/MIN/1.73
GLUCOSE BLD-MCNC: 92 MG/DL (ref 70–110)
GLUCOSE BLDC GLUCOMTR-MCNC: 149 MG/DL (ref 70–130)
GLUCOSE BLDC GLUCOMTR-MCNC: 156 MG/DL (ref 70–130)
HCT VFR BLD AUTO: 36.7 % (ref 37–47)
HGB BLD-MCNC: 11.7 G/DL (ref 12–16)
IMM GRANULOCYTES # BLD: 0.07 10*3/MM3 (ref 0–0.03)
IMM GRANULOCYTES NFR BLD: 0.7 % (ref 0–0.5)
LYMPHOCYTES # BLD AUTO: 2.47 10*3/MM3 (ref 1–3)
LYMPHOCYTES NFR BLD AUTO: 26.3 % (ref 16–46)
MAGNESIUM SERPL-MCNC: 2.2 MG/DL (ref 1.7–2.6)
MCH RBC QN AUTO: 28.2 PG (ref 27–33)
MCHC RBC AUTO-ENTMCNC: 31.9 G/DL (ref 33–37)
MCV RBC AUTO: 88.4 FL (ref 80–94)
MONOCYTES # BLD AUTO: 0.84 10*3/MM3 (ref 0.1–0.9)
MONOCYTES NFR BLD AUTO: 8.9 % (ref 0–12)
NEUTROPHILS # BLD AUTO: 5.72 10*3/MM3 (ref 1.4–6.5)
NEUTROPHILS NFR BLD AUTO: 60.9 % (ref 40–75)
OSMOLALITY SERPL CALC.SUM OF ELEC: 276.9 MOSM/KG (ref 273–305)
PLATELET # BLD AUTO: 260 10*3/MM3 (ref 130–400)
PMV BLD AUTO: 11.2 FL (ref 6–10)
POTASSIUM BLD-SCNC: 4.1 MMOL/L (ref 3.5–5.3)
RBC # BLD AUTO: 4.15 10*6/MM3 (ref 4.2–5.4)
SODIUM BLD-SCNC: 138 MMOL/L (ref 135–153)
WBC NRBC COR # BLD: 9.4 10*3/MM3 (ref 4.5–12.5)

## 2018-06-27 PROCEDURE — 99232 SBSQ HOSP IP/OBS MODERATE 35: CPT | Performed by: INTERNAL MEDICINE

## 2018-06-27 PROCEDURE — 94799 UNLISTED PULMONARY SVC/PX: CPT

## 2018-06-27 PROCEDURE — 97116 GAIT TRAINING THERAPY: CPT

## 2018-06-27 PROCEDURE — 80048 BASIC METABOLIC PNL TOTAL CA: CPT | Performed by: INTERNAL MEDICINE

## 2018-06-27 PROCEDURE — 82962 GLUCOSE BLOOD TEST: CPT

## 2018-06-27 PROCEDURE — G8979 MOBILITY GOAL STATUS: HCPCS

## 2018-06-27 PROCEDURE — 97530 THERAPEUTIC ACTIVITIES: CPT

## 2018-06-27 PROCEDURE — G8980 MOBILITY D/C STATUS: HCPCS

## 2018-06-27 PROCEDURE — 63710000001 INSULIN ASPART PER 5 UNITS: Performed by: INTERNAL MEDICINE

## 2018-06-27 PROCEDURE — 99239 HOSP IP/OBS DSCHRG MGMT >30: CPT | Performed by: INTERNAL MEDICINE

## 2018-06-27 PROCEDURE — 83735 ASSAY OF MAGNESIUM: CPT | Performed by: INTERNAL MEDICINE

## 2018-06-27 PROCEDURE — 93010 ELECTROCARDIOGRAM REPORT: CPT | Performed by: INTERNAL MEDICINE

## 2018-06-27 PROCEDURE — 93005 ELECTROCARDIOGRAM TRACING: CPT | Performed by: INTERNAL MEDICINE

## 2018-06-27 PROCEDURE — G8978 MOBILITY CURRENT STATUS: HCPCS

## 2018-06-27 PROCEDURE — 85025 COMPLETE CBC W/AUTO DIFF WBC: CPT | Performed by: INTERNAL MEDICINE

## 2018-06-27 RX ORDER — FLECAINIDE ACETATE 50 MG/1
50 TABLET ORAL EVERY 12 HOURS SCHEDULED
Qty: 60 TABLET | Refills: 0 | Status: SHIPPED | OUTPATIENT
Start: 2018-06-27 | End: 2018-07-11 | Stop reason: SDUPTHER

## 2018-06-27 RX ADMIN — METOPROLOL TARTRATE 25 MG: 25 TABLET, FILM COATED ORAL at 08:12

## 2018-06-27 RX ADMIN — FAMOTIDINE 40 MG: 20 TABLET, FILM COATED ORAL at 08:12

## 2018-06-27 RX ADMIN — FERROUS SULFATE TAB 325 MG (65 MG ELEMENTAL FE) 325 MG: 325 (65 FE) TAB at 08:12

## 2018-06-27 RX ADMIN — INSULIN ASPART 2 UNITS: 100 INJECTION, SOLUTION INTRAVENOUS; SUBCUTANEOUS at 13:20

## 2018-06-27 RX ADMIN — ATORVASTATIN CALCIUM 20 MG: 20 TABLET, FILM COATED ORAL at 08:12

## 2018-06-27 RX ADMIN — FLECAINIDE ACETATE 50 MG: 50 TABLET ORAL at 09:07

## 2018-06-27 RX ADMIN — BUDESONIDE AND FORMOTEROL FUMARATE DIHYDRATE 2 PUFF: 80; 4.5 AEROSOL RESPIRATORY (INHALATION) at 06:25

## 2018-06-27 RX ADMIN — APIXABAN 5 MG: 5 TABLET, FILM COATED ORAL at 08:11

## 2018-06-27 RX ADMIN — PANTOPRAZOLE SODIUM 40 MG: 40 TABLET, DELAYED RELEASE ORAL at 05:55

## 2018-06-29 LAB
BACTERIA SPEC AEROBE CULT: NORMAL

## 2018-07-11 ENCOUNTER — OFFICE VISIT (OUTPATIENT)
Dept: CARDIOLOGY | Facility: CLINIC | Age: 73
End: 2018-07-11

## 2018-07-11 VITALS
RESPIRATION RATE: 16 BRPM | BODY MASS INDEX: 37.82 KG/M2 | HEART RATE: 51 BPM | DIASTOLIC BLOOD PRESSURE: 65 MMHG | WEIGHT: 227 LBS | HEIGHT: 65 IN | SYSTOLIC BLOOD PRESSURE: 137 MMHG

## 2018-07-11 DIAGNOSIS — I10 ESSENTIAL HYPERTENSION: ICD-10-CM

## 2018-07-11 DIAGNOSIS — I48.0 PAROXYSMAL ATRIAL FIBRILLATION (HCC): Primary | ICD-10-CM

## 2018-07-11 DIAGNOSIS — I50.32 CHRONIC DIASTOLIC HEART FAILURE (HCC): ICD-10-CM

## 2018-07-11 PROCEDURE — 99214 OFFICE O/P EST MOD 30 MIN: CPT | Performed by: INTERNAL MEDICINE

## 2018-07-11 PROCEDURE — 93000 ELECTROCARDIOGRAM COMPLETE: CPT | Performed by: INTERNAL MEDICINE

## 2018-07-11 RX ORDER — BUMETANIDE 1 MG/1
1 TABLET ORAL DAILY
Qty: 30 TABLET | Refills: 3 | Status: SHIPPED | OUTPATIENT
Start: 2018-07-11 | End: 2018-12-12

## 2018-07-11 RX ORDER — ACETAMINOPHEN 325 MG/1
650 TABLET ORAL EVERY 6 HOURS PRN
COMMUNITY
End: 2018-12-12

## 2018-07-11 RX ORDER — LISINOPRIL 5 MG/1
5 TABLET ORAL DAILY
COMMUNITY
End: 2019-12-20 | Stop reason: HOSPADM

## 2018-07-11 RX ORDER — FLECAINIDE ACETATE 50 MG/1
50 TABLET ORAL EVERY 12 HOURS SCHEDULED
Qty: 60 TABLET | Refills: 5 | Status: SHIPPED | OUTPATIENT
Start: 2018-07-11 | End: 2018-12-12

## 2018-07-11 RX ORDER — PANTOPRAZOLE SODIUM 40 MG/1
40 TABLET, DELAYED RELEASE ORAL DAILY
Status: ON HOLD | COMMUNITY
End: 2019-01-25

## 2018-07-11 RX ORDER — FUROSEMIDE 20 MG/1
20 TABLET ORAL DAILY
COMMUNITY
End: 2018-07-11 | Stop reason: ALTCHOICE

## 2018-07-11 RX ORDER — POTASSIUM CITRATE 5 MEQ/1
20 TABLET, EXTENDED RELEASE ORAL DAILY
COMMUNITY
End: 2018-12-12

## 2018-07-11 NOTE — PROGRESS NOTES
Priscila Eugene, APRN  Mayra Hays  1945 07/11/2018    Patient Active Problem List   Diagnosis   • Pneumonia   • Sepsis due to pneumonia (CMS/HCC)   • Chronic diastolic heart failure (CMS/HCC)   • Elevated troponin   • Chest pain in adult   • Simple chronic bronchitis (CMS/HCC)   • Essential hypertension   • Type 2 diabetes mellitus (CMS/HCC)   • Abnormal nuclear stress test with moderate to large size anteroapical and lateral wall myocardial ischemia.   • Pneumonia of left lower lobe due to infectious organism (CMS/HCC)   • Acute renal insufficiency   • Paroxysmal atrial fibrillation (CMS/HCC)       Dear Priscila Eugene, APRN:    Subjective     Mayra Hays is a 72 y.o. female with the problems as listed above, presents    Chief complaint: Follow-up of atrial fibrillation.    History of Present Illness: Ms. Hays is a pleasant 72-year-old  female with history of atrial fibrillation for which she was recently evaluated in the hospital and was initiated on flecainide and Eliquis.  She is here for a regular cardiology follow-up.  She denies any compressive palpitations, dizziness or syncope.  She denies any bleeding problems with Eliquis.  Overall she's been doing well.  She gained about 12 pounds in 6/24/2018.    No Known Allergies:      Current Outpatient Prescriptions:   •  acetaminophen (TYLENOL) 325 MG tablet, Take 650 mg by mouth Every 6 (Six) Hours As Needed for Mild Pain ., Disp: , Rfl:   •  apixaban (ELIQUIS) 5 MG tablet tablet, Take 1 tablet by mouth Every 12 (Twelve) Hours., Disp: 60 tablet, Rfl: 0  •  atorvastatin (LIPITOR) 20 MG tablet, Take 20 mg by mouth Daily., Disp: , Rfl:   •  esomeprazole (nexIUM) 20 MG capsule, Take 20 mg by mouth Every Morning Before Breakfast., Disp: , Rfl:   •  ferrous sulfate 325 (65 FE) MG tablet, Take 325 mg by mouth 2 (Two) Times a Day., Disp: , Rfl:   •  flecainide (TAMBOCOR) 50 MG tablet, Take 1 tablet by mouth Every 12 (Twelve) Hours., Disp: 60  tablet, Rfl: 0  •  Fluticasone Furoate-Vilanterol (BREO ELLIPTA) 200-25 MCG/INH aerosol powder , Inhale 1 puff Daily., Disp: , Rfl:   •  furosemide (LASIX) 20 MG tablet, Take 20 mg by mouth Daily., Disp: , Rfl:   •  gabapentin (NEURONTIN) 300 MG capsule, Take 300 mg by mouth Every Night., Disp: , Rfl:   •  ipratropium-albuterol (DUO-NEB) 0.5-2.5 mg/mL nebulizer, Take 3 mL by nebulization Every 4 (Four) Hours As Needed for Wheezing., Disp: 120 vial, Rfl: 11  •  lisinopril (PRINIVIL,ZESTRIL) 5 MG tablet, Take 5 mg by mouth Daily., Disp: , Rfl:   •  magnesium oxide (MAGOX) 400 (241.3 Mg) MG tablet tablet, Take 400 mg by mouth 2 (Two) Times a Day., Disp: , Rfl:   •  metFORMIN (GLUCOPHAGE) 500 MG tablet, Take 500 mg by mouth 2 (Two) Times a Day With Meals., Disp: , Rfl:   •  metoprolol tartrate (LOPRESSOR) 25 MG tablet, Take 1 tablet by mouth Every 12 (Twelve) Hours., Disp: 60 tablet, Rfl: 0  •  nystatin (MYCOSTATIN) 087458 UNIT/GM powder, Apply 1 application topically 3 (Three) Times a Day. Prior to [facility] Admission, Patient was on: applies under left arm pit area, Disp: , Rfl:   •  pantoprazole (PROTONIX) 40 MG EC tablet, Take 40 mg by mouth Daily., Disp: , Rfl:   •  potassium citrate (UROCIT-K) 5 MEQ (540 MG) CR tablet, Take  by mouth Daily., Disp: , Rfl:   •  traZODone (DESYREL) 50 MG tablet, Take 50 mg by mouth Every Night., Disp: , Rfl:       The following portions of the patient's history were reviewed and updated as appropriate: allergies, current medications, past family history, past medical history, past social history, past surgical history and problem list.    Social History   Substance Use Topics   • Smoking status: Former Smoker     Packs/day: 3.00     Types: Cigarettes     Quit date: 2015   • Smokeless tobacco: Never Used   • Alcohol use No       Review of Systems   Cardiovascular: Positive for palpitations. Negative for chest pain and leg swelling.   Respiratory: Negative for shortness of breath.   "      Objective   Vitals:    07/11/18 1200   BP: 137/65   Pulse: 51   Resp: 16   Weight: 103 kg (227 lb)   Height: 165.1 cm (65\")     Body mass index is 37.77 kg/m².        Physical Exam   Constitutional: She is oriented to person, place, and time. She appears well-developed and well-nourished.   HENT:   Mouth/Throat: Oropharynx is clear and moist.   Eyes: EOM are normal. Pupils are equal, round, and reactive to light.   Neck: Neck supple. No JVD present. No tracheal deviation present. No thyromegaly present.   Cardiovascular: Normal rate, regular rhythm, S1 normal and S2 normal.  Exam reveals no gallop and no friction rub.    No murmur heard.  Pulmonary/Chest: Effort normal and breath sounds normal.   Abdominal: Soft. Bowel sounds are normal. She exhibits no mass. There is no tenderness.   Musculoskeletal: Normal range of motion. She exhibits edema (2+ EDEMA ON BOTH LEGS.).   Lymphadenopathy:     She has no cervical adenopathy.   Neurological: She is alert and oriented to person, place, and time.   Skin: Skin is warm and dry. No rash noted.   Psychiatric: She has a normal mood and affect.       Lab Results   Component Value Date     06/27/2018    K 4.1 06/27/2018     06/27/2018    CO2 22.4 (L) 06/27/2018    BUN 17 06/27/2018    CREATININE 1.01 06/27/2018    GLUCOSE 92 06/27/2018    CALCIUM 8.8 06/27/2018    AST 12 06/24/2018    ALT 20 06/24/2018    ALKPHOS 86 06/24/2018    LABIL2 1.3 (L) 06/24/2018     Lab Results   Component Value Date    CKTOTAL 14 (L) 06/25/2018     Lab Results   Component Value Date    WBC 9.40 06/27/2018    HGB 11.7 (L) 06/27/2018    HCT 36.7 (L) 06/27/2018     06/27/2018     Lab Results   Component Value Date    INR 1.02 06/01/2017    INR 1.03 05/31/2017    INR 0.98 05/31/2017     Lab Results   Component Value Date    MG 2.2 06/27/2018     Lab Results   Component Value Date    TSH 4.145 06/24/2018    TRIG 290 (H) 06/09/2017    HDL 25 (L) 06/09/2017    LDL 57 06/09/2017    "   Lab Results   Component Value Date    BNP 34.0 06/24/2018       During this visit the following were done:  Hospital Records Reviewed [x]          ECG 12 Lead  Date/Time: 7/11/2018 12:05 PM  Performed by: JATINDER SHAH  Authorized by: JATINDER SHAH   Rhythm: sinus rhythm  Conduction: conduction normal  ST Segments: ST segments normal  Comments: QTC: 451 ms.             Assessment/Plan :   Diagnosis Plan   1. Paroxysmal atrial fibrillation, maintaining sinus rhythm on flecainide.      2. Chronic diastolic heart failure , appears relatively compensated although she has a 12 pound weight gain since 6/24/2018.      3. Essential hypertension, controlled.       Chronic oral anticoagulation with Eliquis with no bleeding issues.           Recommendations:  1. Continue with flecainide and Eliquis.  2. Discontinue furosemide and start Bumex 1 mg by mouth daily.  3. BMP and a magnesium level in 1 week.  4. I've instructed her on salt restricted diet and specifically to avoid salt rich foods such as canned vegetables, canned soups, pickles, processed meats and pizzas etc.    Return in about 3 months (around 10/11/2018).    As always, I appreciate very much the opportunity to participate in the cardiovascular care of your patients.      With Best Regards,    Jatinder Shah MD, FACC    Dragon disclaimer:  Much of this encounter note is an electronic transcription/translation of spoken language to printed text. The electronic translation of spoken language may permit erroneous, or at times, nonsensical words or phrases to be inadvertently transcribed; Although I have reviewed the note for such errors, some may still exist.

## 2018-07-16 ENCOUNTER — LAB (OUTPATIENT)
Dept: LAB | Facility: HOSPITAL | Age: 73
End: 2018-07-16
Attending: INTERNAL MEDICINE

## 2018-07-16 DIAGNOSIS — I50.32 CHRONIC DIASTOLIC HEART FAILURE (HCC): ICD-10-CM

## 2018-07-16 DIAGNOSIS — I48.0 PAROXYSMAL ATRIAL FIBRILLATION (HCC): ICD-10-CM

## 2018-07-16 LAB
ALBUMIN SERPL-MCNC: 4.1 G/DL (ref 3.4–4.8)
ALBUMIN/GLOB SERPL: 1.6 G/DL (ref 1.5–2.5)
ALP SERPL-CCNC: 101 U/L (ref 35–104)
ALT SERPL W P-5'-P-CCNC: 30 U/L (ref 10–36)
ANION GAP SERPL CALCULATED.3IONS-SCNC: 5.3 MMOL/L (ref 3.6–11.2)
AST SERPL-CCNC: 26 U/L (ref 10–30)
BILIRUB SERPL-MCNC: 0.3 MG/DL (ref 0.2–1.8)
BUN BLD-MCNC: 10 MG/DL (ref 7–21)
BUN/CREAT SERPL: 11.4 (ref 7–25)
CALCIUM SPEC-SCNC: 8.8 MG/DL (ref 7.7–10)
CHLORIDE SERPL-SCNC: 106 MMOL/L (ref 99–112)
CO2 SERPL-SCNC: 31.7 MMOL/L (ref 24.3–31.9)
CREAT BLD-MCNC: 0.88 MG/DL (ref 0.43–1.29)
GFR SERPL CREATININE-BSD FRML MDRD: 63 ML/MIN/1.73
GLOBULIN UR ELPH-MCNC: 2.6 GM/DL
GLUCOSE BLD-MCNC: 109 MG/DL (ref 70–110)
MAGNESIUM SERPL-MCNC: 1.9 MG/DL (ref 1.7–2.6)
OSMOLALITY SERPL CALC.SUM OF ELEC: 284.6 MOSM/KG (ref 273–305)
POTASSIUM BLD-SCNC: 3.8 MMOL/L (ref 3.5–5.3)
PROT SERPL-MCNC: 6.7 G/DL (ref 6–8)
SODIUM BLD-SCNC: 143 MMOL/L (ref 135–153)

## 2018-07-16 PROCEDURE — 36415 COLL VENOUS BLD VENIPUNCTURE: CPT

## 2018-07-16 PROCEDURE — 80053 COMPREHEN METABOLIC PANEL: CPT

## 2018-07-16 PROCEDURE — 83735 ASSAY OF MAGNESIUM: CPT

## 2018-10-18 ENCOUNTER — OFFICE VISIT (OUTPATIENT)
Dept: PULMONOLOGY | Facility: CLINIC | Age: 73
End: 2018-10-18

## 2018-10-18 VITALS
WEIGHT: 223 LBS | OXYGEN SATURATION: 94 % | DIASTOLIC BLOOD PRESSURE: 71 MMHG | HEIGHT: 65 IN | SYSTOLIC BLOOD PRESSURE: 139 MMHG | HEART RATE: 50 BPM | TEMPERATURE: 97.7 F | BODY MASS INDEX: 37.15 KG/M2

## 2018-10-18 DIAGNOSIS — R06.02 SOB (SHORTNESS OF BREATH): ICD-10-CM

## 2018-10-18 DIAGNOSIS — J41.0 SIMPLE CHRONIC BRONCHITIS (HCC): Primary | ICD-10-CM

## 2018-10-18 DIAGNOSIS — G47.33 OSA (OBSTRUCTIVE SLEEP APNEA): ICD-10-CM

## 2018-10-18 DIAGNOSIS — R91.1 LUNG NODULE: ICD-10-CM

## 2018-10-18 DIAGNOSIS — J84.9 INTERSTITIAL LUNG DISEASE (HCC): ICD-10-CM

## 2018-10-18 DIAGNOSIS — Z23 ENCOUNTER FOR IMMUNIZATION: ICD-10-CM

## 2018-10-18 PROCEDURE — 90662 IIV NO PRSV INCREASED AG IM: CPT | Performed by: INTERNAL MEDICINE

## 2018-10-18 PROCEDURE — 99214 OFFICE O/P EST MOD 30 MIN: CPT | Performed by: INTERNAL MEDICINE

## 2018-10-18 PROCEDURE — G0008 ADMIN INFLUENZA VIRUS VAC: HCPCS | Performed by: INTERNAL MEDICINE

## 2018-10-18 RX ORDER — IPRATROPIUM BROMIDE AND ALBUTEROL SULFATE 2.5; .5 MG/3ML; MG/3ML
3 SOLUTION RESPIRATORY (INHALATION) EVERY 4 HOURS PRN
Qty: 120 VIAL | Refills: 11 | Status: ON HOLD | OUTPATIENT
Start: 2018-10-18 | End: 2019-01-26

## 2018-10-18 RX ORDER — ALBUTEROL SULFATE 90 UG/1
2 AEROSOL, METERED RESPIRATORY (INHALATION) EVERY 4 HOURS PRN
Qty: 1 INHALER | Refills: 11 | Status: SHIPPED | OUTPATIENT
Start: 2018-10-18 | End: 2018-12-12

## 2018-10-18 NOTE — PROGRESS NOTES
Subjective   Mayra Hays is a 73 y.o. female who is being seen for Bronchitis    History of Present Illness   Patient returns for follow-up for COPD.  Since her last visit she was hospitalized twice, last time she had atrial fibrillation which was later on converted to sinus.  At present she is at her baseline.  Tells me that she use oral or inhalers and nebulizer, asking for refills.  Past Medical History:   Diagnosis Date   • Collapsed lung    • COPD (chronic obstructive pulmonary disease) (CMS/HCC)    • Diabetes mellitus (CMS/HCC)    • GERD (gastroesophageal reflux disease)    • Hyperlipidemia    • Hypertension    • Myocardial infarction (CMS/HCC)      Past Surgical History:   Procedure Laterality Date   • CARDIAC CATHETERIZATION N/A 8/22/2017    Procedure: Left Heart Cath;  Surgeon: Jatinder Matias MD;  Location: [facility] CATH INVASIVE LOCATION;  Service:    • GALLBLADDER SURGERY       History reviewed. No pertinent family history.   reports that she quit smoking about 3 years ago. Her smoking use included Cigarettes. She smoked 3.00 packs per day. She has never used smokeless tobacco. She reports that she does not drink alcohol or use drugs.  No Known Allergies        Patient has not received a flu shot or a pneumonia vaccination.     The following portions of the patient's history were reviewed and updated as appropriate: allergies, current medications, past family history, past medical history, past social history, past surgical history and problem list.    Review of Systems   Constitutional: Negative for appetite change, chills, diaphoresis and unexpected weight change.   HENT: Negative for sore throat, trouble swallowing and voice change.    Eyes: Negative for visual disturbance.   Respiratory: Positive for cough and wheezing. Negative for apnea, choking and shortness of breath.    Cardiovascular: Negative for chest pain, palpitations and leg swelling.   Gastrointestinal: Negative for abdominal pain,  "constipation, diarrhea, nausea and vomiting.   Endocrine: Negative for cold intolerance, heat intolerance, polydipsia, polyphagia and polyuria.   Genitourinary: Negative for difficulty urinating and dysuria.   Musculoskeletal: Negative for gait problem.   Skin: Negative for rash and wound.   Neurological: Negative for syncope and light-headedness.   Hematological: Negative for adenopathy.   Psychiatric/Behavioral: Negative for agitation, behavioral problems and confusion.   All other systems reviewed and are negative.      Objective   /71   Pulse 50   Temp 97.7 °F (36.5 °C) (Oral)   Ht 165.1 cm (65\")   Wt 101 kg (223 lb)   SpO2 94%   BMI 37.11 kg/m²   Physical Exam   Constitutional: She is oriented to person, place, and time.   HENT:   Head: Normocephalic and atraumatic.   Nose: Mucosal edema present.   Eyes: Pupils are equal, round, and reactive to light. EOM are normal.   Neck: Neck supple.   Cardiovascular: Normal rate, regular rhythm and normal heart sounds.    Pulmonary/Chest: She has rhonchi.   Vesicular breath sound bilaterally with prolonged expiratory phase   Abdominal: Soft. Bowel sounds are normal.   Musculoskeletal: Normal range of motion. She exhibits no deformity.   Neurological: She is alert and oriented to person, place, and time.   Skin: Skin is warm and dry.   Psychiatric: She has a normal mood and affect. Her behavior is normal.   Nursing note and vitals reviewed.        Radiology:  Ct Head Without Contrast    Result Date: 6/25/2018  No acute intracranial pathology. Nothing is seen on this exam to specifically account for the patient's symptoms.  This report was finalized on 6/25/2018 8:14 AM by Dr. Mauricio Santiago MD.      Xr Chest 1 View    Result Date: 6/24/2018  No evidence of active or acute cardiopulmonary disease on today's chest radiograph.     This report was finalized on 6/24/2018 5:18 PM by Dr. Mauricio Santiago MD.      Us Carotid Bilateral    Result Date: 6/25/2018  No evidence " of hemodynamically significant plaques or stenosis within the carotid system at this time.  This report was finalized on 6/25/2018 2:29 PM by Dr. Mauricio Santiago MD.        Lab Results:  Lab on 07/16/2018   Component Date Value Ref Range Status   • Glucose 07/16/2018 109  70 - 110 mg/dL Final   • BUN 07/16/2018 10  7 - 21 mg/dL Final   • Creatinine 07/16/2018 0.88  0.43 - 1.29 mg/dL Final   • Sodium 07/16/2018 143  135 - 153 mmol/L Final   • Potassium 07/16/2018 3.8  3.5 - 5.3 mmol/L Final   • Chloride 07/16/2018 106  99 - 112 mmol/L Final   • CO2 07/16/2018 31.7  24.3 - 31.9 mmol/L Final   • Calcium 07/16/2018 8.8  7.7 - 10.0 mg/dL Final   • Total Protein 07/16/2018 6.7  6.0 - 8.0 g/dL Final   • Albumin 07/16/2018 4.10  3.40 - 4.80 g/dL Final   • ALT (SGPT) 07/16/2018 30  10 - 36 U/L Final   • AST (SGOT) 07/16/2018 26  10 - 30 U/L Final   • Alkaline Phosphatase 07/16/2018 101  35 - 104 U/L Final   • Total Bilirubin 07/16/2018 0.3  0.2 - 1.8 mg/dL Final   • eGFR Non African Amer 07/16/2018 63  >60 mL/min/1.73 Final   • Globulin 07/16/2018 2.6  gm/dL Final   • A/G Ratio 07/16/2018 1.6  1.5 - 2.5 g/dL Final   • BUN/Creatinine Ratio 07/16/2018 11.4  7.0 - 25.0 Final   • Anion Gap 07/16/2018 5.3  3.6 - 11.2 mmol/L Final   • Magnesium 07/16/2018 1.9  1.7 - 2.6 mg/dL Final   • Osmolality Calc 07/16/2018 284.6  273.0 - 305.0 mOsm/kg Final       Assessment      ICD-10-CM ICD-9-CM   1. Simple chronic bronchitis (CMS/HCC) J41.0 491.0   2. Interstitial lung disease (CMS/HCC) J84.9 515   3. OSWALDO (obstructive sleep apnea) G47.33 327.23   4. Lung nodule R91.1 793.11                DISCUSSION:  This patient is at her baseline at this point, we elected to continue her current regimen which includes albuterol inhaler on a when necessary basis, DuoNeb nebulizer and Breo Ellipta.  Would leave her again in approximately 3 months from now.    Patient was found to have a 5 mm size lung nodule in the right lower lobe last year, we would  recommend a CT scan of the chest this year.  I would also repeat her pulmonary function test.  Patient has obstructive sleep apnea, pressure therapy was prescribed but she did not want to continue.  Today he had a good discussion with her and urged her to use it as much as she can.    Plan    Orders Placed This Encounter   Procedures   • CT Chest Without Contrast   • Pulmonary Function Test     New Medications Ordered This Visit   Medications   • albuterol (PROAIR HFA) 108 (90 Base) MCG/ACT inhaler     Sig: Inhale 2 puffs Every 4 (Four) Hours As Needed for Shortness of Air.     Dispense:  1 inhaler     Refill:  11   • Fluticasone Furoate-Vilanterol (BREO ELLIPTA) 100-25 MCG/INH aerosol powder      Sig: Inhale 1 puff Daily.     Dispense:  1 each     Refill:  6   • ipratropium-albuterol (DUO-NEB) 0.5-2.5 mg/3 ml nebulizer     Sig: Take 3 mL by nebulization Every 4 (Four) Hours As Needed for Wheezing.     Dispense:  120 vial     Refill:  11                  Hemalatha Brewer MD, FCCP, [facility]  Pulmonary, Critical Care, and Sleep Medicine

## 2018-10-23 ENCOUNTER — APPOINTMENT (OUTPATIENT)
Dept: RESPIRATORY THERAPY | Facility: HOSPITAL | Age: 73
End: 2018-10-23
Attending: INTERNAL MEDICINE

## 2018-10-23 ENCOUNTER — APPOINTMENT (OUTPATIENT)
Dept: CT IMAGING | Facility: HOSPITAL | Age: 73
End: 2018-10-23
Attending: INTERNAL MEDICINE

## 2018-10-30 ENCOUNTER — HOSPITAL ENCOUNTER (OUTPATIENT)
Dept: CT IMAGING | Facility: HOSPITAL | Age: 73
Discharge: HOME | End: 2018-10-30
Attending: INTERNAL MEDICINE | Admitting: INTERNAL MEDICINE

## 2018-10-30 ENCOUNTER — HOSPITAL ENCOUNTER (OUTPATIENT)
Dept: RESPIRATORY THERAPY | Facility: HOSPITAL | Age: 73
Discharge: HOME | End: 2018-10-30
Attending: INTERNAL MEDICINE

## 2018-10-30 DIAGNOSIS — J84.9 INTERSTITIAL LUNG DISEASE (HCC): ICD-10-CM

## 2018-10-30 DIAGNOSIS — J41.0 SIMPLE CHRONIC BRONCHITIS (HCC): ICD-10-CM

## 2018-10-30 DIAGNOSIS — R91.1 LUNG NODULE: ICD-10-CM

## 2018-10-30 DIAGNOSIS — G47.33 OSA (OBSTRUCTIVE SLEEP APNEA): ICD-10-CM

## 2018-10-30 PROCEDURE — 71250 CT THORAX DX C-: CPT | Performed by: RADIOLOGY

## 2018-10-30 PROCEDURE — 94727 GAS DIL/WSHOT DETER LNG VOL: CPT | Performed by: INTERNAL MEDICINE

## 2018-10-30 PROCEDURE — 94727 GAS DIL/WSHOT DETER LNG VOL: CPT

## 2018-10-30 PROCEDURE — 71250 CT THORAX DX C-: CPT

## 2018-10-30 PROCEDURE — 94060 EVALUATION OF WHEEZING: CPT

## 2018-10-30 PROCEDURE — 94060 EVALUATION OF WHEEZING: CPT | Performed by: INTERNAL MEDICINE

## 2018-10-30 PROCEDURE — 94640 AIRWAY INHALATION TREATMENT: CPT

## 2018-10-30 PROCEDURE — 94729 DIFFUSING CAPACITY: CPT

## 2018-10-30 PROCEDURE — 94729 DIFFUSING CAPACITY: CPT | Performed by: INTERNAL MEDICINE

## 2018-10-30 RX ORDER — ALBUTEROL SULFATE 2.5 MG/3ML
2.5 SOLUTION RESPIRATORY (INHALATION) ONCE
Status: COMPLETED | OUTPATIENT
Start: 2018-10-30 | End: 2018-10-30

## 2018-10-30 RX ADMIN — ALBUTEROL SULFATE 2.5 MG: 2.5 SOLUTION RESPIRATORY (INHALATION) at 10:35

## 2018-11-07 ENCOUNTER — APPOINTMENT (OUTPATIENT)
Dept: GENERAL RADIOLOGY | Facility: HOSPITAL | Age: 73
End: 2018-11-07

## 2018-11-07 ENCOUNTER — DOCUMENTATION (OUTPATIENT)
Dept: PULMONOLOGY | Facility: CLINIC | Age: 73
End: 2018-11-07

## 2018-11-07 ENCOUNTER — APPOINTMENT (OUTPATIENT)
Dept: CT IMAGING | Facility: HOSPITAL | Age: 73
End: 2018-11-07

## 2018-11-07 ENCOUNTER — HOSPITAL ENCOUNTER (EMERGENCY)
Facility: HOSPITAL | Age: 73
Discharge: HOME | End: 2018-11-07
Admitting: NURSE PRACTITIONER

## 2018-11-07 VITALS
DIASTOLIC BLOOD PRESSURE: 76 MMHG | WEIGHT: 220 LBS | TEMPERATURE: 98 F | OXYGEN SATURATION: 97 % | HEART RATE: 71 BPM | SYSTOLIC BLOOD PRESSURE: 120 MMHG | HEIGHT: 68 IN | RESPIRATION RATE: 18 BRPM | BODY MASS INDEX: 33.34 KG/M2

## 2018-11-07 DIAGNOSIS — K42.9 PARAUMBILICAL HERNIA: Primary | ICD-10-CM

## 2018-11-07 DIAGNOSIS — R10.9 ABDOMINAL PAIN, UNSPECIFIED ABDOMINAL LOCATION: ICD-10-CM

## 2018-11-07 LAB
ALBUMIN SERPL-MCNC: 4.6 G/DL (ref 3.4–4.8)
ALBUMIN/GLOB SERPL: 1.5 G/DL (ref 1.5–2.5)
ALP SERPL-CCNC: 133 U/L (ref 35–104)
ALT SERPL W P-5'-P-CCNC: 22 U/L (ref 10–36)
ANION GAP SERPL CALCULATED.3IONS-SCNC: 5.8 MMOL/L (ref 3.6–11.2)
AST SERPL-CCNC: 17 U/L (ref 10–30)
BACTERIA UR QL AUTO: ABNORMAL /HPF
BASOPHILS # BLD AUTO: 0.1 10*3/MM3 (ref 0–0.3)
BASOPHILS NFR BLD AUTO: 1.1 % (ref 0–2)
BILIRUB SERPL-MCNC: 0.3 MG/DL (ref 0.2–1.8)
BILIRUB UR QL STRIP: NEGATIVE
BUN BLD-MCNC: 26 MG/DL (ref 7–21)
BUN/CREAT SERPL: 22.2 (ref 7–25)
CALCIUM SPEC-SCNC: 9.1 MG/DL (ref 7.7–10)
CHLORIDE SERPL-SCNC: 109 MMOL/L (ref 99–112)
CLARITY UR: CLEAR
CO2 SERPL-SCNC: 25.2 MMOL/L (ref 24.3–31.9)
COLOR UR: YELLOW
CREAT BLD-MCNC: 1.17 MG/DL (ref 0.43–1.29)
DEPRECATED RDW RBC AUTO: 38.8 FL (ref 37–54)
EOSINOPHIL # BLD AUTO: 0.31 10*3/MM3 (ref 0–0.7)
EOSINOPHIL NFR BLD AUTO: 3.4 % (ref 0–7)
ERYTHROCYTE [DISTWIDTH] IN BLOOD BY AUTOMATED COUNT: 12.8 % (ref 11.5–14.5)
GFR SERPL CREATININE-BSD FRML MDRD: 45 ML/MIN/1.73
GLOBULIN UR ELPH-MCNC: 3.1 GM/DL
GLUCOSE BLD-MCNC: 143 MG/DL (ref 70–110)
GLUCOSE UR STRIP-MCNC: NEGATIVE MG/DL
HCT VFR BLD AUTO: 42.6 % (ref 37–47)
HGB BLD-MCNC: 13.7 G/DL (ref 12–16)
HGB UR QL STRIP.AUTO: NEGATIVE
HOLD SPECIMEN: NORMAL
HOLD SPECIMEN: NORMAL
HYALINE CASTS UR QL AUTO: ABNORMAL /LPF
IMM GRANULOCYTES # BLD: 0.03 10*3/MM3 (ref 0–0.03)
IMM GRANULOCYTES NFR BLD: 0.3 % (ref 0–0.5)
KETONES UR QL STRIP: NEGATIVE
LEUKOCYTE ESTERASE UR QL STRIP.AUTO: ABNORMAL
LIPASE SERPL-CCNC: 41 U/L (ref 13–60)
LYMPHOCYTES # BLD AUTO: 2.03 10*3/MM3 (ref 1–3)
LYMPHOCYTES NFR BLD AUTO: 22.4 % (ref 16–46)
MCH RBC QN AUTO: 27.3 PG (ref 27–33)
MCHC RBC AUTO-ENTMCNC: 32.2 G/DL (ref 33–37)
MCV RBC AUTO: 84.9 FL (ref 80–94)
MONOCYTES # BLD AUTO: 0.9 10*3/MM3 (ref 0.1–0.9)
MONOCYTES NFR BLD AUTO: 9.9 % (ref 0–12)
NEUTROPHILS # BLD AUTO: 5.71 10*3/MM3 (ref 1.4–6.5)
NEUTROPHILS NFR BLD AUTO: 62.9 % (ref 40–75)
NITRITE UR QL STRIP: NEGATIVE
OSMOLALITY SERPL CALC.SUM OF ELEC: 286.6 MOSM/KG (ref 273–305)
PH UR STRIP.AUTO: <=5 [PH] (ref 5–8)
PLATELET # BLD AUTO: 230 10*3/MM3 (ref 130–400)
PMV BLD AUTO: 11.8 FL (ref 6–10)
POTASSIUM BLD-SCNC: 4.1 MMOL/L (ref 3.5–5.3)
PROT SERPL-MCNC: 7.7 G/DL (ref 6–8)
PROT UR QL STRIP: NEGATIVE
RBC # BLD AUTO: 5.02 10*6/MM3 (ref 4.2–5.4)
RBC # UR: ABNORMAL /HPF
REF LAB TEST METHOD: ABNORMAL
SODIUM BLD-SCNC: 140 MMOL/L (ref 135–153)
SP GR UR STRIP: 1.02 (ref 1–1.03)
SQUAMOUS #/AREA URNS HPF: ABNORMAL /HPF
UROBILINOGEN UR QL STRIP: ABNORMAL
WBC NRBC COR # BLD: 9.08 10*3/MM3 (ref 4.5–12.5)
WBC UR QL AUTO: ABNORMAL /HPF
WHOLE BLOOD HOLD SPECIMEN: NORMAL
WHOLE BLOOD HOLD SPECIMEN: NORMAL
YEAST URNS QL MICRO: ABNORMAL /HPF

## 2018-11-07 PROCEDURE — 83690 ASSAY OF LIPASE: CPT | Performed by: EMERGENCY MEDICINE

## 2018-11-07 PROCEDURE — 85025 COMPLETE CBC W/AUTO DIFF WBC: CPT | Performed by: EMERGENCY MEDICINE

## 2018-11-07 PROCEDURE — 71045 X-RAY EXAM CHEST 1 VIEW: CPT | Performed by: RADIOLOGY

## 2018-11-07 PROCEDURE — 99284 EMERGENCY DEPT VISIT MOD MDM: CPT

## 2018-11-07 PROCEDURE — 80053 COMPREHEN METABOLIC PANEL: CPT | Performed by: EMERGENCY MEDICINE

## 2018-11-07 PROCEDURE — 74176 CT ABD & PELVIS W/O CONTRAST: CPT

## 2018-11-07 PROCEDURE — 71045 X-RAY EXAM CHEST 1 VIEW: CPT

## 2018-11-07 PROCEDURE — 74176 CT ABD & PELVIS W/O CONTRAST: CPT | Performed by: RADIOLOGY

## 2018-11-07 PROCEDURE — 81001 URINALYSIS AUTO W/SCOPE: CPT | Performed by: EMERGENCY MEDICINE

## 2018-11-07 RX ORDER — HYDROCODONE BITARTRATE AND ACETAMINOPHEN 5; 325 MG/1; MG/1
1 TABLET ORAL EVERY 6 HOURS PRN
Status: DISCONTINUED | OUTPATIENT
Start: 2018-11-07 | End: 2018-11-08 | Stop reason: HOSPADM

## 2018-11-07 RX ADMIN — HYDROCODONE BITARTRATE AND ACETAMINOPHEN 1 TABLET: 5; 325 TABLET ORAL at 22:22

## 2018-11-07 NOTE — PROGRESS NOTES
"Called patient today to let her know results of tests, per provider's instructions. Patient's daughter stated patient was complaining of a \"fluid feeling\" underneath breast area. Spoke to ERASTO Bowden who instructed me to inform patient/family that she needed to be seen in ED for further evaluation/treatment as this could be potentially serious. Patient/caregiver was educated and agreed to go to ED for further treatment. Provider aware.   "

## 2018-11-07 NOTE — ED TRIAGE NOTES
Pt reassess and made aware of high volume of high acuity pt's but would be called to room as possible, pt reports no change. Will continue to monitor while in lobby.

## 2018-11-08 NOTE — ED PROVIDER NOTES
Subjective     History provided by:  Patient   used: No    Abdominal Pain   Pain location:  Generalized  Pain quality: aching    Pain radiates to:  Does not radiate  Pain severity:  Moderate  Onset quality:  Sudden  Duration:  7 days  Timing:  Constant  Progression:  Waxing and waning  Chronicity:  New  Context: not alcohol use, not awakening from sleep, not diet changes, not eating, not laxative use, not medication withdrawal, not previous surgeries, not recent illness, not retching, not sick contacts, not suspicious food intake and not trauma    Relieved by:  Nothing  Worsened by:  Nothing  Ineffective treatments:  None tried  Associated symptoms: no anorexia, no belching, no chills, no dysuria, no fatigue, no fever, no flatus, no nausea, no shortness of breath, no sore throat, no vaginal bleeding and no vomiting    Risk factors: no alcohol abuse, no aspirin use, has not had multiple surgeries, no NSAID use, not obese, not pregnant and no recent hospitalization        Review of Systems   Constitutional: Negative.  Negative for chills, fatigue and fever.   HENT: Negative.  Negative for sore throat.    Eyes: Negative.    Respiratory: Negative.  Negative for shortness of breath.    Cardiovascular: Negative.    Gastrointestinal: Positive for abdominal pain. Negative for anorexia, flatus, nausea and vomiting.   Endocrine: Negative.    Genitourinary: Negative.  Negative for dysuria and vaginal bleeding.   Musculoskeletal: Negative.    Skin: Negative.    Allergic/Immunologic: Negative.    Neurological: Negative.    Hematological: Negative.    Psychiatric/Behavioral: Negative.        Past Medical History:   Diagnosis Date   • Collapsed lung    • COPD (chronic obstructive pulmonary disease) (CMS/[facility])    • Diabetes mellitus (CMS/[facility])    • GERD (gastroesophageal reflux disease)    • Hyperlipidemia    • Hypertension    • Myocardial infarction (CMS/[facility])        No Known Allergies    Past Surgical History:    Procedure Laterality Date   • CARDIAC CATHETERIZATION N/A 8/22/2017    Procedure: Left Heart Cath;  Surgeon: Jatinder Matias MD;  Location: [facility] CATH INVASIVE LOCATION;  Service:    • GALLBLADDER SURGERY         No family history on file.    Social History     Social History   • Marital status: Other     Social History Main Topics   • Smoking status: Former Smoker     Packs/day: 3.00     Types: Cigarettes     Quit date: 2015   • Smokeless tobacco: Never Used   • Alcohol use No   • Drug use: No     Other Topics Concern   • Not on file           Objective   Physical Exam   Constitutional: She is oriented to person, place, and time. She appears well-developed and well-nourished.   HENT:   Head: Normocephalic.   Right Ear: External ear normal.   Left Ear: External ear normal.   Mouth/Throat: Oropharynx is clear and moist.   Eyes: Pupils are equal, round, and reactive to light. Conjunctivae and EOM are normal.   Neck: Normal range of motion. Neck supple.   Cardiovascular: Normal rate, regular rhythm, normal heart sounds and intact distal pulses.    Pulmonary/Chest: Effort normal and breath sounds normal.   Abdominal: Soft. Bowel sounds are normal.   Musculoskeletal: Normal range of motion.   Neurological: She is alert and oriented to person, place, and time.   Skin: Skin is warm and dry. Capillary refill takes less than 2 seconds.   Psychiatric: She has a normal mood and affect. Her behavior is normal. Thought content normal.   Nursing note and vitals reviewed.      Procedures           ED Course                  MDM      Final diagnoses:   Paraumbilical hernia   Abdominal pain, unspecified abdominal location            Blaise Stahl, APRN  11/07/18 [id number]

## 2018-11-27 ENCOUNTER — OFFICE VISIT (OUTPATIENT)
Dept: CARDIOLOGY | Facility: CLINIC | Age: 73
End: 2018-11-27

## 2018-11-27 VITALS
HEIGHT: 68 IN | DIASTOLIC BLOOD PRESSURE: 75 MMHG | WEIGHT: 221.6 LBS | BODY MASS INDEX: 33.59 KG/M2 | HEART RATE: 52 BPM | SYSTOLIC BLOOD PRESSURE: 128 MMHG

## 2018-11-27 DIAGNOSIS — I10 ESSENTIAL HYPERTENSION: ICD-10-CM

## 2018-11-27 DIAGNOSIS — I50.32 CHRONIC DIASTOLIC HEART FAILURE (HCC): Primary | ICD-10-CM

## 2018-11-27 DIAGNOSIS — I48.0 PAROXYSMAL ATRIAL FIBRILLATION (HCC): ICD-10-CM

## 2018-11-27 PROCEDURE — 93000 ELECTROCARDIOGRAM COMPLETE: CPT | Performed by: PHYSICIAN ASSISTANT

## 2018-11-27 PROCEDURE — 99213 OFFICE O/P EST LOW 20 MIN: CPT | Performed by: PHYSICIAN ASSISTANT

## 2018-11-27 RX ORDER — ATENOLOL 25 MG/1
25 TABLET ORAL DAILY
COMMUNITY
End: 2018-11-27

## 2018-11-27 RX ORDER — FUROSEMIDE 20 MG/1
20 TABLET ORAL DAILY
COMMUNITY
End: 2018-11-27 | Stop reason: ALTCHOICE

## 2018-11-27 RX ORDER — METHIMAZOLE 5 MG/1
5 TABLET ORAL 3 TIMES DAILY
COMMUNITY
End: 2018-12-12

## 2018-11-27 NOTE — PROGRESS NOTES
Elena Garcia APRN  Mayra Hays  1945 11/27/2018    Patient Active Problem List   Diagnosis   • Pneumonia   • Sepsis due to pneumonia (CMS/HCC)   • Chronic diastolic heart failure (CMS/HCC)   • Elevated troponin   • Chest pain in adult   • Simple chronic bronchitis (CMS/HCC)   • Essential hypertension   • Type 2 diabetes mellitus (CMS/HCC)   • Abnormal nuclear stress test with moderate to large size anteroapical and lateral wall myocardial ischemia.   • Pneumonia of left lower lobe due to infectious organism (CMS/HCC)   • Acute renal insufficiency   • Paroxysmal atrial fibrillation (CMS/HCC)       Dear Elena Garcia APRN:    Subjective       History of Present Illness:    Chief Complaint   Patient presents with   • Atrial Fibrillation     follow up   • Med Management     bottles       Mayra Hays is a pleasant 73 y.o. female with a past medical history significant for  atrial fibrillation for which she was recently evaluated in the hospital and was initiated on flecainide and Eliquis.  She is here for a regular cardiology follow-up.     Symptom wise patient states that she's been doing well.  She denies any chest pain, shortness of breath, weakness, dizziness, or syncope.  She also denies any orthopnea, PND, or pedal edema.  However she does state that she has not been taking her Eliquis or flecainide.  She states the reason for this is because when she goes to the pharmacy recently did not feel for her and she did not inquire about it.      No Known Allergies:      Current Outpatient Medications:   •  albuterol (PROAIR HFA) 108 (90 Base) MCG/ACT inhaler, Inhale 2 puffs Every 4 (Four) Hours As Needed for Shortness of Air., Disp: 1 inhaler, Rfl: 11  •  atorvastatin (LIPITOR) 20 MG tablet, Take 20 mg by mouth Daily., Disp: , Rfl:   •  esomeprazole (nexIUM) 20 MG capsule, Take 20 mg by mouth Every Morning Before Breakfast., Disp: , Rfl:   •  ferrous sulfate 325 (65 FE) MG tablet, Take  325 mg by mouth 2 (Two) Times a Day., Disp: , Rfl:   •  Fluticasone Furoate-Vilanterol (BREO ELLIPTA) 100-25 MCG/INH aerosol powder , Inhale 1 puff Daily., Disp: 1 each, Rfl: 6  •  Fluticasone Furoate-Vilanterol (BREO ELLIPTA) 200-25 MCG/INH aerosol powder , Inhale 1 puff Daily., Disp: , Rfl:   •  gabapentin (NEURONTIN) 300 MG capsule, Take 300 mg by mouth Every Night., Disp: , Rfl:   •  ipratropium-albuterol (DUO-NEB) 0.5-2.5 mg/3 ml nebulizer, Take 3 mL by nebulization Every 4 (Four) Hours As Needed for Wheezing., Disp: 120 vial, Rfl: 11  •  ipratropium-albuterol (DUO-NEB) 0.5-2.5 mg/mL nebulizer, Take 3 mL by nebulization Every 4 (Four) Hours As Needed for Wheezing., Disp: 120 vial, Rfl: 11  •  lisinopril (PRINIVIL,ZESTRIL) 5 MG tablet, Take 5 mg by mouth Daily., Disp: , Rfl:   •  metFORMIN (GLUCOPHAGE) 500 MG tablet, Take 500 mg by mouth 2 (Two) Times a Day With Meals., Disp: , Rfl:   •  methIMAzole (TAPAZOLE) 5 MG tablet, Take 5 mg by mouth 3 (Three) Times a Day., Disp: , Rfl:   •  pantoprazole (PROTONIX) 40 MG EC tablet, Take 40 mg by mouth Daily., Disp: , Rfl:   •  potassium citrate (UROCIT-K) 5 MEQ (540 MG) CR tablet, Take 20 mEq by mouth Daily., Disp: , Rfl:   •  acetaminophen (TYLENOL) 325 MG tablet, Take 650 mg by mouth Every 6 (Six) Hours As Needed for Mild Pain ., Disp: , Rfl:   •  apixaban (ELIQUIS) 5 MG tablet tablet, Take 1 tablet by mouth Every 12 (Twelve) Hours., Disp: 60 tablet, Rfl: 5  •  bumetanide (BUMEX) 1 MG tablet, Take 1 tablet by mouth Daily., Disp: 30 tablet, Rfl: 3  •  flecainide (TAMBOCOR) 50 MG tablet, Take 1 tablet by mouth Every 12 (Twelve) Hours., Disp: 60 tablet, Rfl: 5  •  magnesium oxide (MAGOX) 400 (241.3 Mg) MG tablet tablet, Take 400 mg by mouth 2 (Two) Times a Day., Disp: , Rfl:   •  nystatin (MYCOSTATIN) 876526 UNIT/GM powder, Apply 1 application topically 3 (Three) Times a Day. Prior to [facility] Admission, Patient was on: applies under left arm pit area, Disp: , Rfl:   •   "traZODone (DESYREL) 50 MG tablet, Take 50 mg by mouth Every Night., Disp: , Rfl:       The following portions of the patient's history were reviewed and updated as appropriate: allergies, current medications, past family history, past medical history, past social history, past surgical history and problem list.    Social History     Tobacco Use   • Smoking status: Former Smoker     Packs/day: 3.00     Types: Cigarettes     Last attempt to quit: 2015     Years since quitting: 3.9   • Smokeless tobacco: Never Used   Substance Use Topics   • Alcohol use: No   • Drug use: No       Review of Systems   Constitution: Negative for weakness and malaise/fatigue.   Cardiovascular: Positive for palpitations. Negative for chest pain, dyspnea on exertion, irregular heartbeat and leg swelling.   Respiratory: Positive for wheezing. Negative for cough and shortness of breath.    Hematologic/Lymphatic: Negative for bleeding problem. Does not bruise/bleed easily.   Gastrointestinal: Negative for nausea and vomiting.   Neurological: Negative for dizziness.       Objective   Vitals:    11/27/18 1000   BP: 128/75   BP Location: Right arm   Patient Position: Sitting   Cuff Size: Adult   Pulse: 52   Weight: 101 kg (221 lb 9.6 oz)   Height: 172.7 cm (68\")     Body mass index is 33.69 kg/m².        Physical Exam   Constitutional: She is oriented to person, place, and time. She appears well-developed and well-nourished. No distress.   HENT:   Head: Normocephalic and atraumatic.   Cardiovascular: Normal rate, regular rhythm, normal heart sounds and intact distal pulses.   Pulmonary/Chest: Effort normal and breath sounds normal. No respiratory distress.   Musculoskeletal: She exhibits edema (1+ pedal edema bilaterally).   Neurological: She is alert and oriented to person, place, and time.   Skin: She is not diaphoretic.       Lab Results   Component Value Date     11/07/2018    K 4.1 11/07/2018     11/07/2018    CO2 25.2 11/07/2018 "    BUN 26 (H) 11/07/2018    CREATININE 1.17 11/07/2018    GLUCOSE 143 (H) 11/07/2018    CALCIUM 9.1 11/07/2018    AST 17 11/07/2018    ALT 22 11/07/2018    ALKPHOS 133 (H) 11/07/2018     Lab Results   Component Value Date    CKTOTAL 14 (L) 06/25/2018     Lab Results   Component Value Date    WBC 9.08 11/07/2018    HGB 13.7 11/07/2018    HCT 42.6 11/07/2018     11/07/2018     Lab Results   Component Value Date    INR 1.02 06/01/2017    INR 1.03 05/31/2017    INR 0.98 05/31/2017     Lab Results   Component Value Date    MG 1.9 07/16/2018     Lab Results   Component Value Date    TSH 4.145 06/24/2018    TRIG 290 (H) 06/09/2017    HDL 25 (L) 06/09/2017    LDL 57 06/09/2017      Lab Results   Component Value Date    BNP 34.0 06/24/2018       During this visit the following were done:  Labs Reviewed [x]    Labs Ordered []    Radiology Reports Reviewed [x]    Radiology Ordered []    PCP Records Reviewed []    Referring Provider Records Reviewed []    ER Records Reviewed []    Hospital Records Reviewed []    History Obtained From Family []    Radiology Images Reviewed []    Other Reviewed []    Records Requested []         ECG 12 Lead  Date/Time: 11/27/2018 10:16 AM  Performed by: Ana Grimes CMA  Authorized by: Darek Nelson PA-C   Rhythm: sinus rhythm  Conduction: conduction normal  ST Segments: ST segments normal  T depression: V1, V3 and V4  T flattening: V2  Clinical impression: normal ECG and non-specific ECG  Comments:               Assessment/Plan    Diagnosis Plan   1. Chronic diastolic heart failure (CMS/HCC)     2. Essential hypertension     3. Paroxysmal atrial fibrillation (CMS/HCC)                  Recommendations:  1. I strongly encouraged her to start taking her Eliquis skin and represcribed it for her and gave her a month supply of samples.  2. Since her heart rate by EKG was 49 today, I will not start flecainide today as she is also in sinus rhythm.  3. She does not believe she  is taking metoprolol however she is taking atenolol on both are on her medicine list so I discontinued the metoprolol.  An informed her to only take the atenolol and if she has medicine at home labeled metoprolol to not take it.  4. Follow-up in 3 months.           Return in about 3 months (around 2/27/2019).    As always, I appreciate very much the opportunity to participate in the cardiovascular care of your patients.      With Best Regards,    SHY Meza disclaimer:  Much of this encounter note is an electronic transcription/translation of spoken language to printed text. The electronic translation of spoken language may permit erroneous, or at times, nonsensical words or phrases to be inadvertently transcribed; Although I have reviewed the note for such errors, some may still exist.

## 2018-12-12 ENCOUNTER — APPOINTMENT (OUTPATIENT)
Dept: GENERAL RADIOLOGY | Facility: HOSPITAL | Age: 73
End: 2018-12-12

## 2018-12-12 ENCOUNTER — HOSPITAL ENCOUNTER (OUTPATIENT)
Facility: HOSPITAL | Age: 73
Setting detail: OBSERVATION
Discharge: HOME | End: 2018-12-14
Attending: EMERGENCY MEDICINE | Admitting: EMERGENCY MEDICINE

## 2018-12-12 DIAGNOSIS — R00.1 BRADYCARDIA, SINUS: ICD-10-CM

## 2018-12-12 DIAGNOSIS — I10 ESSENTIAL HYPERTENSION: ICD-10-CM

## 2018-12-12 DIAGNOSIS — R07.9 CHEST PAIN, UNSPECIFIED TYPE: Primary | ICD-10-CM

## 2018-12-12 LAB
A-A DO2: 21.2 MMHG (ref 0–300)
ALBUMIN SERPL-MCNC: 4.7 G/DL (ref 3.4–4.8)
ALBUMIN/GLOB SERPL: 1.4 G/DL (ref 1.5–2.5)
ALP SERPL-CCNC: 139 U/L (ref 35–104)
ALT SERPL W P-5'-P-CCNC: 24 U/L (ref 10–36)
ANION GAP SERPL CALCULATED.3IONS-SCNC: 7.4 MMOL/L (ref 3.6–11.2)
ANION GAP SERPL CALCULATED.3IONS-SCNC: 9.8 MMOL/L (ref 3.6–11.2)
ARTERIAL PATENCY WRIST A: POSITIVE
AST SERPL-CCNC: 19 U/L (ref 10–30)
ATMOSPHERIC PRESS: 731 MMHG
BASE EXCESS BLDA CALC-SCNC: 3.3 MMOL/L
BASOPHILS # BLD AUTO: 0.03 10*3/MM3 (ref 0–0.3)
BASOPHILS # BLD AUTO: 0.04 10*3/MM3 (ref 0–0.3)
BASOPHILS NFR BLD AUTO: 0.4 % (ref 0–2)
BASOPHILS NFR BLD AUTO: 0.6 % (ref 0–2)
BDY SITE: ABNORMAL
BILIRUB SERPL-MCNC: 0.3 MG/DL (ref 0.2–1.8)
BODY TEMPERATURE: 98.6 C
BUN BLD-MCNC: 14 MG/DL (ref 7–21)
BUN BLD-MCNC: 15 MG/DL (ref 7–21)
BUN/CREAT SERPL: 15.7 (ref 7–25)
BUN/CREAT SERPL: 17.2 (ref 7–25)
CALCIUM SPEC-SCNC: 9.2 MG/DL (ref 7.7–10)
CALCIUM SPEC-SCNC: 9.6 MG/DL (ref 7.7–10)
CHLORIDE SERPL-SCNC: 103 MMOL/L (ref 99–112)
CHLORIDE SERPL-SCNC: 103 MMOL/L (ref 99–112)
CO2 SERPL-SCNC: 29.2 MMOL/L (ref 24.3–31.9)
CO2 SERPL-SCNC: 30.6 MMOL/L (ref 24.3–31.9)
COHGB MFR BLD: 1.6 % (ref 0–5)
CREAT BLD-MCNC: 0.87 MG/DL (ref 0.43–1.29)
CREAT BLD-MCNC: 0.89 MG/DL (ref 0.43–1.29)
D DIMER PPP FEU-MCNC: 0.34 MCGFEU/ML (ref 0–0.5)
DEPRECATED RDW RBC AUTO: 41.1 FL (ref 37–54)
DEPRECATED RDW RBC AUTO: 42 FL (ref 37–54)
EOSINOPHIL # BLD AUTO: 0.33 10*3/MM3 (ref 0–0.7)
EOSINOPHIL # BLD AUTO: 0.34 10*3/MM3 (ref 0–0.7)
EOSINOPHIL NFR BLD AUTO: 4.3 % (ref 0–7)
EOSINOPHIL NFR BLD AUTO: 4.7 % (ref 0–7)
ERYTHROCYTE [DISTWIDTH] IN BLOOD BY AUTOMATED COUNT: 13.2 % (ref 11.5–14.5)
ERYTHROCYTE [DISTWIDTH] IN BLOOD BY AUTOMATED COUNT: 13.2 % (ref 11.5–14.5)
GFR SERPL CREATININE-BSD FRML MDRD: 62 ML/MIN/1.73
GFR SERPL CREATININE-BSD FRML MDRD: 64 ML/MIN/1.73
GLOBULIN UR ELPH-MCNC: 3.3 GM/DL
GLUCOSE BLD-MCNC: 128 MG/DL (ref 70–110)
GLUCOSE BLD-MCNC: 93 MG/DL (ref 70–110)
GLUCOSE BLDC GLUCOMTR-MCNC: 141 MG/DL (ref 70–130)
HCO3 BLDA-SCNC: 28.9 MMOL/L (ref 22–26)
HCT VFR BLD AUTO: 36.7 % (ref 37–47)
HCT VFR BLD AUTO: 42.7 % (ref 37–47)
HCT VFR BLD CALC: 38 % (ref 37–47)
HGB BLD-MCNC: 11.4 G/DL (ref 12–16)
HGB BLD-MCNC: 13.2 G/DL (ref 12–16)
HGB BLDA-MCNC: 12.8 G/DL (ref 12–16)
HOLD SPECIMEN: NORMAL
HOLD SPECIMEN: NORMAL
HOROWITZ INDEX BLD+IHG-RTO: 21 %
IMM GRANULOCYTES # BLD: 0.09 10*3/MM3 (ref 0–0.03)
IMM GRANULOCYTES # BLD: 0.09 10*3/MM3 (ref 0–0.03)
IMM GRANULOCYTES NFR BLD: 1.2 % (ref 0–0.5)
IMM GRANULOCYTES NFR BLD: 1.3 % (ref 0–0.5)
LYMPHOCYTES # BLD AUTO: 1.93 10*3/MM3 (ref 1–3)
LYMPHOCYTES # BLD AUTO: 2.25 10*3/MM3 (ref 1–3)
LYMPHOCYTES NFR BLD AUTO: 26.8 % (ref 16–46)
LYMPHOCYTES NFR BLD AUTO: 29.6 % (ref 16–46)
MAGNESIUM SERPL-MCNC: 1.8 MG/DL (ref 1.7–2.6)
MCH RBC QN AUTO: 26.6 PG (ref 27–33)
MCH RBC QN AUTO: 26.8 PG (ref 27–33)
MCHC RBC AUTO-ENTMCNC: 30.9 G/DL (ref 33–37)
MCHC RBC AUTO-ENTMCNC: 31.1 G/DL (ref 33–37)
MCV RBC AUTO: 86.1 FL (ref 80–94)
MCV RBC AUTO: 86.4 FL (ref 80–94)
METHGB BLD QL: 0.2 % (ref 0–3)
MODALITY: ABNORMAL
MONOCYTES # BLD AUTO: 0.55 10*3/MM3 (ref 0.1–0.9)
MONOCYTES # BLD AUTO: 0.59 10*3/MM3 (ref 0.1–0.9)
MONOCYTES NFR BLD AUTO: 7.6 % (ref 0–12)
MONOCYTES NFR BLD AUTO: 7.8 % (ref 0–12)
NEUTROPHILS # BLD AUTO: 4.24 10*3/MM3 (ref 1.4–6.5)
NEUTROPHILS # BLD AUTO: 4.31 10*3/MM3 (ref 1.4–6.5)
NEUTROPHILS NFR BLD AUTO: 56.7 % (ref 40–75)
NEUTROPHILS NFR BLD AUTO: 59 % (ref 40–75)
OSMOLALITY SERPL CALC.SUM OF ELEC: 283.3 MOSM/KG (ref 273–305)
OSMOLALITY SERPL CALC.SUM OF ELEC: 283.7 MOSM/KG (ref 273–305)
OXYHGB MFR BLDV: 90.8 % (ref 85–100)
PCO2 BLDA: 47.8 MM HG (ref 35–45)
PH BLDA: 7.4 PH UNITS (ref 7.35–7.45)
PLATELET # BLD AUTO: 278 10*3/MM3 (ref 130–400)
PLATELET # BLD AUTO: 323 10*3/MM3 (ref 130–400)
PMV BLD AUTO: 10.9 FL (ref 6–10)
PMV BLD AUTO: 10.9 FL (ref 6–10)
PO2 BLDA: 65.2 MM HG (ref 80–100)
POTASSIUM BLD-SCNC: 4 MMOL/L (ref 3.5–5.3)
POTASSIUM BLD-SCNC: 4.1 MMOL/L (ref 3.5–5.3)
PROT SERPL-MCNC: 8 G/DL (ref 6–8)
RBC # BLD AUTO: 4.25 10*6/MM3 (ref 4.2–5.4)
RBC # BLD AUTO: 4.96 10*6/MM3 (ref 4.2–5.4)
SAO2 % BLDCOA: 92.5 % (ref 90–100)
SODIUM BLD-SCNC: 141 MMOL/L (ref 135–153)
SODIUM BLD-SCNC: 142 MMOL/L (ref 135–153)
TROPONIN I SERPL-MCNC: <0.006 NG/ML
TSH SERPL DL<=0.05 MIU/L-ACNC: 3.77 MIU/ML (ref 0.55–4.78)
WBC NRBC COR # BLD: 7.19 10*3/MM3 (ref 4.5–12.5)
WBC NRBC COR # BLD: 7.6 10*3/MM3 (ref 4.5–12.5)
WHOLE BLOOD HOLD SPECIMEN: NORMAL
WHOLE BLOOD HOLD SPECIMEN: NORMAL

## 2018-12-12 PROCEDURE — 94799 UNLISTED PULMONARY SVC/PX: CPT

## 2018-12-12 PROCEDURE — 84484 ASSAY OF TROPONIN QUANT: CPT | Performed by: EMERGENCY MEDICINE

## 2018-12-12 PROCEDURE — 82962 GLUCOSE BLOOD TEST: CPT

## 2018-12-12 PROCEDURE — 80053 COMPREHEN METABOLIC PANEL: CPT | Performed by: EMERGENCY MEDICINE

## 2018-12-12 PROCEDURE — 71045 X-RAY EXAM CHEST 1 VIEW: CPT | Performed by: RADIOLOGY

## 2018-12-12 PROCEDURE — 94640 AIRWAY INHALATION TREATMENT: CPT

## 2018-12-12 PROCEDURE — 63710000001 INSULIN ASPART PER 5 UNITS: Performed by: NURSE PRACTITIONER

## 2018-12-12 PROCEDURE — 87040 BLOOD CULTURE FOR BACTERIA: CPT | Performed by: EMERGENCY MEDICINE

## 2018-12-12 PROCEDURE — 36415 COLL VENOUS BLD VENIPUNCTURE: CPT

## 2018-12-12 PROCEDURE — 82805 BLOOD GASES W/O2 SATURATION: CPT | Performed by: EMERGENCY MEDICINE

## 2018-12-12 PROCEDURE — 85379 FIBRIN DEGRADATION QUANT: CPT | Performed by: EMERGENCY MEDICINE

## 2018-12-12 PROCEDURE — 93010 ELECTROCARDIOGRAM REPORT: CPT | Performed by: INTERNAL MEDICINE

## 2018-12-12 PROCEDURE — 36600 WITHDRAWAL OF ARTERIAL BLOOD: CPT | Performed by: EMERGENCY MEDICINE

## 2018-12-12 PROCEDURE — 71045 X-RAY EXAM CHEST 1 VIEW: CPT

## 2018-12-12 PROCEDURE — G0378 HOSPITAL OBSERVATION PER HR: HCPCS

## 2018-12-12 PROCEDURE — 84443 ASSAY THYROID STIM HORMONE: CPT | Performed by: EMERGENCY MEDICINE

## 2018-12-12 PROCEDURE — 83050 HGB METHEMOGLOBIN QUAN: CPT | Performed by: EMERGENCY MEDICINE

## 2018-12-12 PROCEDURE — 82375 ASSAY CARBOXYHB QUANT: CPT | Performed by: EMERGENCY MEDICINE

## 2018-12-12 PROCEDURE — 93005 ELECTROCARDIOGRAM TRACING: CPT | Performed by: EMERGENCY MEDICINE

## 2018-12-12 PROCEDURE — 84484 ASSAY OF TROPONIN QUANT: CPT | Performed by: NURSE PRACTITIONER

## 2018-12-12 PROCEDURE — 85025 COMPLETE CBC W/AUTO DIFF WBC: CPT | Performed by: EMERGENCY MEDICINE

## 2018-12-12 PROCEDURE — 85025 COMPLETE CBC W/AUTO DIFF WBC: CPT | Performed by: NURSE PRACTITIONER

## 2018-12-12 PROCEDURE — 83735 ASSAY OF MAGNESIUM: CPT | Performed by: NURSE PRACTITIONER

## 2018-12-12 PROCEDURE — 99285 EMERGENCY DEPT VISIT HI MDM: CPT

## 2018-12-12 RX ORDER — FUROSEMIDE 20 MG/1
20 TABLET ORAL DAILY
Status: ON HOLD | COMMUNITY
End: 2019-01-25

## 2018-12-12 RX ORDER — POTASSIUM CHLORIDE 750 MG/1
40 CAPSULE, EXTENDED RELEASE ORAL AS NEEDED
Status: DISCONTINUED | OUTPATIENT
Start: 2018-12-12 | End: 2018-12-14 | Stop reason: HOSPADM

## 2018-12-12 RX ORDER — ATORVASTATIN CALCIUM 20 MG/1
20 TABLET, FILM COATED ORAL DAILY
Status: DISCONTINUED | OUTPATIENT
Start: 2018-12-13 | End: 2018-12-14 | Stop reason: HOSPADM

## 2018-12-12 RX ORDER — POTASSIUM CHLORIDE 20 MEQ/1
20 TABLET, EXTENDED RELEASE ORAL DAILY
Status: DISCONTINUED | OUTPATIENT
Start: 2018-12-13 | End: 2018-12-14 | Stop reason: HOSPADM

## 2018-12-12 RX ORDER — BUDESONIDE AND FORMOTEROL FUMARATE DIHYDRATE 80; 4.5 UG/1; UG/1
2 AEROSOL RESPIRATORY (INHALATION)
Status: DISCONTINUED | OUTPATIENT
Start: 2018-12-12 | End: 2018-12-14 | Stop reason: HOSPADM

## 2018-12-12 RX ORDER — POTASSIUM CHLORIDE 20 MEQ/1
20 TABLET, EXTENDED RELEASE ORAL DAILY
Status: ON HOLD | COMMUNITY
End: 2019-01-25

## 2018-12-12 RX ORDER — LISINOPRIL 2.5 MG/1
5 TABLET ORAL DAILY
Status: DISCONTINUED | OUTPATIENT
Start: 2018-12-13 | End: 2018-12-14 | Stop reason: HOSPADM

## 2018-12-12 RX ORDER — ONDANSETRON 4 MG/1
4 TABLET, FILM COATED ORAL EVERY 6 HOURS PRN
Status: DISCONTINUED | OUTPATIENT
Start: 2018-12-12 | End: 2018-12-14 | Stop reason: HOSPADM

## 2018-12-12 RX ORDER — PANTOPRAZOLE SODIUM 40 MG/1
40 TABLET, DELAYED RELEASE ORAL EVERY MORNING
Status: DISCONTINUED | OUTPATIENT
Start: 2018-12-13 | End: 2018-12-14 | Stop reason: HOSPADM

## 2018-12-12 RX ORDER — SODIUM CHLORIDE 0.9 % (FLUSH) 0.9 %
3 SYRINGE (ML) INJECTION EVERY 12 HOURS SCHEDULED
Status: DISCONTINUED | OUTPATIENT
Start: 2018-12-12 | End: 2018-12-14 | Stop reason: HOSPADM

## 2018-12-12 RX ORDER — NICOTINE POLACRILEX 4 MG
15 LOZENGE BUCCAL
Status: DISCONTINUED | OUTPATIENT
Start: 2018-12-12 | End: 2018-12-14 | Stop reason: HOSPADM

## 2018-12-12 RX ORDER — POTASSIUM CHLORIDE 1.5 G/1.77G
40 POWDER, FOR SOLUTION ORAL AS NEEDED
Status: DISCONTINUED | OUTPATIENT
Start: 2018-12-12 | End: 2018-12-14 | Stop reason: HOSPADM

## 2018-12-12 RX ORDER — ALBUTEROL SULFATE 90 UG/1
2 AEROSOL, METERED RESPIRATORY (INHALATION) EVERY 4 HOURS PRN
Status: ON HOLD | COMMUNITY
End: 2020-05-13

## 2018-12-12 RX ORDER — NITROGLYCERIN 0.4 MG/1
0.4 TABLET SUBLINGUAL
Status: DISCONTINUED | OUTPATIENT
Start: 2018-12-12 | End: 2018-12-14 | Stop reason: HOSPADM

## 2018-12-12 RX ORDER — ASPIRIN 81 MG/1
81 TABLET ORAL DAILY
Status: DISCONTINUED | OUTPATIENT
Start: 2018-12-13 | End: 2018-12-14 | Stop reason: HOSPADM

## 2018-12-12 RX ORDER — FUROSEMIDE 20 MG/1
20 TABLET ORAL DAILY
Status: DISCONTINUED | OUTPATIENT
Start: 2018-12-13 | End: 2018-12-14 | Stop reason: HOSPADM

## 2018-12-12 RX ORDER — ALBUTEROL SULFATE 2.5 MG/3ML
2.5 SOLUTION RESPIRATORY (INHALATION) 2 TIMES DAILY
Status: DISCONTINUED | OUTPATIENT
Start: 2018-12-12 | End: 2018-12-14 | Stop reason: HOSPADM

## 2018-12-12 RX ORDER — POTASSIUM CHLORIDE 7.45 MG/ML
10 INJECTION INTRAVENOUS
Status: DISCONTINUED | OUTPATIENT
Start: 2018-12-12 | End: 2018-12-14 | Stop reason: HOSPADM

## 2018-12-12 RX ORDER — DEXTROSE MONOHYDRATE 25 G/50ML
25 INJECTION, SOLUTION INTRAVENOUS
Status: DISCONTINUED | OUTPATIENT
Start: 2018-12-12 | End: 2018-12-14 | Stop reason: HOSPADM

## 2018-12-12 RX ORDER — SODIUM CHLORIDE 0.9 % (FLUSH) 0.9 %
10 SYRINGE (ML) INJECTION AS NEEDED
Status: DISCONTINUED | OUTPATIENT
Start: 2018-12-12 | End: 2018-12-14 | Stop reason: HOSPADM

## 2018-12-12 RX ORDER — ASPIRIN 81 MG/1
81 TABLET, CHEWABLE ORAL ONCE
Status: DISCONTINUED | OUTPATIENT
Start: 2018-12-12 | End: 2018-12-12 | Stop reason: SDUPTHER

## 2018-12-12 RX ORDER — CALCIUM CARBONATE 200(500)MG
2 TABLET,CHEWABLE ORAL 2 TIMES DAILY PRN
Status: DISCONTINUED | OUTPATIENT
Start: 2018-12-12 | End: 2018-12-14 | Stop reason: HOSPADM

## 2018-12-12 RX ORDER — PANTOPRAZOLE SODIUM 40 MG/1
40 TABLET, DELAYED RELEASE ORAL DAILY
Status: DISCONTINUED | OUTPATIENT
Start: 2018-12-13 | End: 2018-12-14 | Stop reason: HOSPADM

## 2018-12-12 RX ORDER — ASPIRIN 325 MG
325 TABLET ORAL ONCE
Status: COMPLETED | OUTPATIENT
Start: 2018-12-12 | End: 2018-12-12

## 2018-12-12 RX ORDER — MAGNESIUM SULFATE HEPTAHYDRATE 40 MG/ML
4 INJECTION, SOLUTION INTRAVENOUS AS NEEDED
Status: DISCONTINUED | OUTPATIENT
Start: 2018-12-12 | End: 2018-12-14 | Stop reason: HOSPADM

## 2018-12-12 RX ORDER — ONDANSETRON 2 MG/ML
4 INJECTION INTRAMUSCULAR; INTRAVENOUS EVERY 6 HOURS PRN
Status: DISCONTINUED | OUTPATIENT
Start: 2018-12-12 | End: 2018-12-14 | Stop reason: HOSPADM

## 2018-12-12 RX ORDER — FERROUS SULFATE 325(65) MG
325 TABLET ORAL 2 TIMES DAILY
Status: DISCONTINUED | OUTPATIENT
Start: 2018-12-12 | End: 2018-12-14 | Stop reason: HOSPADM

## 2018-12-12 RX ORDER — ACETAMINOPHEN 325 MG/1
650 TABLET ORAL EVERY 4 HOURS PRN
Status: DISCONTINUED | OUTPATIENT
Start: 2018-12-12 | End: 2018-12-14 | Stop reason: HOSPADM

## 2018-12-12 RX ORDER — ONDANSETRON 4 MG/1
4 TABLET, ORALLY DISINTEGRATING ORAL EVERY 6 HOURS PRN
Status: DISCONTINUED | OUTPATIENT
Start: 2018-12-12 | End: 2018-12-14 | Stop reason: HOSPADM

## 2018-12-12 RX ORDER — IPRATROPIUM BROMIDE AND ALBUTEROL SULFATE 2.5; .5 MG/3ML; MG/3ML
3 SOLUTION RESPIRATORY (INHALATION) EVERY 4 HOURS PRN
Status: DISCONTINUED | OUTPATIENT
Start: 2018-12-12 | End: 2018-12-14 | Stop reason: HOSPADM

## 2018-12-12 RX ORDER — MAGNESIUM SULFATE HEPTAHYDRATE 40 MG/ML
2 INJECTION, SOLUTION INTRAVENOUS AS NEEDED
Status: DISCONTINUED | OUTPATIENT
Start: 2018-12-12 | End: 2018-12-14 | Stop reason: HOSPADM

## 2018-12-12 RX ORDER — ATENOLOL 25 MG/1
25 TABLET ORAL DAILY
Status: DISCONTINUED | OUTPATIENT
Start: 2018-12-13 | End: 2018-12-13

## 2018-12-12 RX ORDER — SODIUM CHLORIDE 0.9 % (FLUSH) 0.9 %
3-10 SYRINGE (ML) INJECTION AS NEEDED
Status: DISCONTINUED | OUTPATIENT
Start: 2018-12-12 | End: 2018-12-14 | Stop reason: HOSPADM

## 2018-12-12 RX ORDER — ATENOLOL 25 MG/1
25 TABLET ORAL DAILY
COMMUNITY
End: 2018-12-14 | Stop reason: HOSPADM

## 2018-12-12 RX ADMIN — FERROUS SULFATE TAB 325 MG (65 MG ELEMENTAL FE) 325 MG: 325 (65 FE) TAB at 20:57

## 2018-12-12 RX ADMIN — APIXABAN 5 MG: 5 TABLET, FILM COATED ORAL at 20:57

## 2018-12-12 RX ADMIN — SODIUM CHLORIDE, PRESERVATIVE FREE 3 ML: 5 INJECTION INTRAVENOUS at 20:58

## 2018-12-12 RX ADMIN — INSULIN ASPART 2 UNITS: 100 INJECTION, SOLUTION INTRAVENOUS; SUBCUTANEOUS at 22:24

## 2018-12-12 RX ADMIN — NITROGLYCERIN 1 INCH: 20 OINTMENT TOPICAL at 15:27

## 2018-12-12 RX ADMIN — ASPIRIN 325 MG: 325 TABLET ORAL at 15:23

## 2018-12-12 RX ADMIN — NITROGLYCERIN 0.5 INCH: 20 OINTMENT TOPICAL at 20:57

## 2018-12-12 RX ADMIN — BUDESONIDE AND FORMOTEROL FUMARATE DIHYDRATE 2 PUFF: 80; 4.5 AEROSOL RESPIRATORY (INHALATION) at 21:59

## 2018-12-12 RX ADMIN — ALBUTEROL SULFATE 2.5 MG: 2.5 SOLUTION RESPIRATORY (INHALATION) at 21:59

## 2018-12-12 NOTE — ED PROVIDER NOTES
Subjective   73-year-old white female complains of chest pain.  Patient describes 2 day history of nonexertional left lateral chest pain radiating to the left neck.  She tried to keep this as significant from her family, but the family told them today.  She denies any shortness of breath, nausea, vomiting, diaphoresis or other complaints.  He does have a history of COPD.  She denies any cough, fever, chills.  She is not taking any medicines for this.            Review of Systems   All other systems reviewed and are negative.      Past Medical History:   Diagnosis Date   • Collapsed lung    • COPD (chronic obstructive pulmonary disease) (CMS/HCC)    • Diabetes mellitus (CMS/HCC)    • GERD (gastroesophageal reflux disease)    • Hyperlipidemia    • Hypertension    • Myocardial infarction (CMS/HCC)        No Known Allergies    Past Surgical History:   Procedure Laterality Date   • GALLBLADDER SURGERY         History reviewed. No pertinent family history.    Social History     Socioeconomic History   • Marital status: Single     Spouse name: Not on file   • Number of children: Not on file   • Years of education: Not on file   • Highest education level: Not on file   Tobacco Use   • Smoking status: Former Smoker     Packs/day: 3.00     Types: Cigarettes     Last attempt to quit: 2015     Years since quitting: 3.9   • Smokeless tobacco: Never Used   Substance and Sexual Activity   • Alcohol use: No   • Drug use: No           Objective   Physical Exam   Constitutional: She is oriented to person, place, and time. She appears well-developed and well-nourished.   HENT:   Head: Normocephalic and atraumatic.   Cardiovascular: Normal rate and regular rhythm.   Pulmonary/Chest: Effort normal and breath sounds normal. She has no wheezes. She has no rhonchi. She has no rales. She exhibits no tenderness.   Musculoskeletal: Normal range of motion.        Right lower leg: She exhibits no edema.        Left lower leg: She exhibits no  edema.   Neurological: She is alert and oriented to person, place, and time.   Skin: Skin is warm and dry.   Psychiatric: She has a normal mood and affect. Her behavior is normal.   Nursing note and vitals reviewed.      Procedures       Results for orders placed or performed during the hospital encounter of 12/12/18   Comprehensive Metabolic Panel   Result Value Ref Range    Glucose 93 70 - 110 mg/dL    BUN 14 7 - 21 mg/dL    Creatinine 0.89 0.43 - 1.29 mg/dL    Sodium 142 135 - 153 mmol/L    Potassium 4.1 3.5 - 5.3 mmol/L    Chloride 103 99 - 112 mmol/L    CO2 29.2 24.3 - 31.9 mmol/L    Calcium 9.6 7.7 - 10.0 mg/dL    Total Protein 8.0 6.0 - 8.0 g/dL    Albumin 4.70 3.40 - 4.80 g/dL    ALT (SGPT) 24 10 - 36 U/L    AST (SGOT) 19 10 - 30 U/L    Alkaline Phosphatase 139 (H) 35 - 104 U/L    Total Bilirubin 0.3 0.2 - 1.8 mg/dL    eGFR Non African Amer 62 >60 mL/min/1.73    Globulin 3.3 gm/dL    A/G Ratio 1.4 (L) 1.5 - 2.5 g/dL    BUN/Creatinine Ratio 15.7 7.0 - 25.0    Anion Gap 9.8 3.6 - 11.2 mmol/L   Troponin   Result Value Ref Range    Troponin I <0.006 <=0.040 ng/mL   CBC Auto Differential   Result Value Ref Range    WBC 7.19 4.50 - 12.50 10*3/mm3    RBC 4.96 4.20 - 5.40 10*6/mm3    Hemoglobin 13.2 12.0 - 16.0 g/dL    Hematocrit 42.7 37.0 - 47.0 %    MCV 86.1 80.0 - 94.0 fL    MCH 26.6 (L) 27.0 - 33.0 pg    MCHC 30.9 (L) 33.0 - 37.0 g/dL    RDW 13.2 11.5 - 14.5 %    RDW-SD 41.1 37.0 - 54.0 fl    MPV 10.9 (H) 6.0 - 10.0 fL    Platelets 323 130 - 400 10*3/mm3    Neutrophil % 59.0 40.0 - 75.0 %    Lymphocyte % 26.8 16.0 - 46.0 %    Monocyte % 7.6 0.0 - 12.0 %    Eosinophil % 4.7 0.0 - 7.0 %    Basophil % 0.6 0.0 - 2.0 %    Immature Grans % 1.3 (H) 0.0 - 0.5 %    Neutrophils, Absolute 4.24 1.40 - 6.50 10*3/mm3    Lymphocytes, Absolute 1.93 1.00 - 3.00 10*3/mm3    Monocytes, Absolute 0.55 0.10 - 0.90 10*3/mm3    Eosinophils, Absolute 0.34 0.00 - 0.70 10*3/mm3    Basophils, Absolute 0.04 0.00 - 0.30 10*3/mm3    Immature  Grans, Absolute 0.09 (H) 0.00 - 0.03 10*3/mm3   Blood Gas, Arterial   Result Value Ref Range    Site Arterial: left radial     Gonzalo's Test Positive     pH, Arterial 7.399 7.350 - 7.450 pH units    pCO2, Arterial 47.8 (H) 35.0 - 45.0 mm Hg    pO2, Arterial 65.2 (L) 80.0 - 100.0 mm Hg    HCO3, Arterial 28.9 (H) 22.0 - 26.0 mmol/L    Base Excess, Arterial 3.3 mmol/L    O2 Saturation, Arterial 92.5 90.0 - 100.0 %    Hemoglobin, Blood Gas 12.8 12 - 16 g/dL    Hematocrit, Blood Gas 38.0 37.0 - 47.0 %    Oxyhemoglobin 90.8 85 - 100 %    Methemoglobin 0.20 0.00 - 3.00 %    Carboxyhemoglobin 1.6 0 - 5 %    A-a Gradiant 21.2 0.0 - 300.0 mmHg    Temperature 98.6 C    Barometric Pressure for Blood Gas 731 mmHg    Modality Room Air     FIO2 21 %   D-dimer, Quantitative   Result Value Ref Range    D-Dimer, Quantitative 0.34 0.00 - 0.50 MCGFEU/mL   Osmolality, Calculated   Result Value Ref Range    Osmolality Calc 283.3 273.0 - 305.0 mOsm/kg   Troponin   Result Value Ref Range    Troponin I <0.006 <=0.040 ng/mL   Light Blue Top   Result Value Ref Range    Extra Tube hold for add-on    Green Top (Gel)   Result Value Ref Range    Extra Tube Hold for add-ons.    Lavender Top   Result Value Ref Range    Extra Tube hold for add-on    Gold Top - SST   Result Value Ref Range    Extra Tube Hold for add-ons.      Xr Chest 1 View    Result Date: 12/12/2018  Narrative: EXAMINATION: XR CHEST 1 VW-  CLINICAL INDICATION:     Chest Pain triage protocol  TECHNIQUE:  XR CHEST 1 VW-  COMPARISON: 11/7/2018  FINDINGS:    Lungs are aerated. Cardiomegaly is noted. No pneumothorax. No effusions. No acute osseous findings.         Impression: Stable chest. No acute cardiopulmonary findings.  This report was finalized on 12/12/2018 3:31 PM by Dr. Kunal Castanon MD.          ED Course  ED Course as of Dec 12 1803   Wed Dec 12, 2018   1627 Sinus bradycardia.  Rate 52.  Left axis deviation.  Q waves in lead V1 and V2.  T-wave inversions in lead V1 through  V3.  No acute ST elevation or depression. ECG 12 Lead [BC]   4496 Discussed with patient and family and reviewed results of workup.  No evidence of acute MI.  However, she does have a Cardiolite with a heart rate in the mid 40s.  We'll plan admission for rule out and continue to follow this bradycardia.  Discussed with Sheryl Mcgee, patient admitted to observation, see orders.  [BC]      ED Course User Index  [BC] Dong Patel MD                  MDM  Number of Diagnoses or Management Options  Bradycardia, sinus:   Chest pain, unspecified type:      Amount and/or Complexity of Data Reviewed  Clinical lab tests: reviewed  Tests in the radiology section of CPT®: reviewed  Tests in the medicine section of CPT®: reviewed  Decide to obtain previous medical records or to obtain history from someone other than the patient: yes    Risk of Complications, Morbidity, and/or Mortality  Presenting problems: high  Diagnostic procedures: moderate  Management options: high          Final diagnoses:   Chest pain, unspecified type   Bradycardia, sinus   Essential hypertension            Dong Patel MD  12/12/18 [id number]

## 2018-12-12 NOTE — ED NOTES
20 gauge in left ac remains patent and continued to floor. Pt remains on 2lpm nc and continued to floor, pt remains in sinus bradycardia. Pt alert and oriented, skin pwd, respirations even and unlabored.      Lyla Longoria RN  12/12/18 [id number]

## 2018-12-12 NOTE — ED NOTES
Unable to monitor pt cardiac rhythm at this time, er room does not have monitoring.     Lyla Longoria, RN  12/12/18 [id number]

## 2018-12-13 ENCOUNTER — APPOINTMENT (OUTPATIENT)
Dept: CARDIOLOGY | Facility: HOSPITAL | Age: 73
End: 2018-12-13
Attending: INTERNAL MEDICINE

## 2018-12-13 ENCOUNTER — APPOINTMENT (OUTPATIENT)
Dept: CARDIOLOGY | Facility: HOSPITAL | Age: 73
End: 2018-12-13

## 2018-12-13 ENCOUNTER — APPOINTMENT (OUTPATIENT)
Dept: NUCLEAR MEDICINE | Facility: HOSPITAL | Age: 73
End: 2018-12-13

## 2018-12-13 LAB
ANION GAP SERPL CALCULATED.3IONS-SCNC: 8.7 MMOL/L (ref 3.6–11.2)
BASOPHILS # BLD AUTO: 0.04 10*3/MM3 (ref 0–0.3)
BASOPHILS NFR BLD AUTO: 0.6 % (ref 0–2)
BH CV ECHO MEAS - % IVS THICK: 9.3 %
BH CV ECHO MEAS - % LVPW THICK: 28.9 %
BH CV ECHO MEAS - ACS: 2.3 CM
BH CV ECHO MEAS - AO MAX PG: 13.8 MMHG
BH CV ECHO MEAS - AO MEAN PG: 5.9 MMHG
BH CV ECHO MEAS - AO ROOT AREA (BSA CORRECTED): 1.6
BH CV ECHO MEAS - AO ROOT AREA: 9.1 CM^2
BH CV ECHO MEAS - AO ROOT DIAM: 3.4 CM
BH CV ECHO MEAS - AO V2 MAX: 186 CM/SEC
BH CV ECHO MEAS - AO V2 MEAN: 112.3 CM/SEC
BH CV ECHO MEAS - AO V2 VTI: 38.3 CM
BH CV ECHO MEAS - BSA(HAYCOCK): 2.2 M^2
BH CV ECHO MEAS - BSA: 2.1 M^2
BH CV ECHO MEAS - BZI_BMI: 34.5 KILOGRAMS/M^2
BH CV ECHO MEAS - BZI_METRIC_HEIGHT: 170.2 CM
BH CV ECHO MEAS - BZI_METRIC_WEIGHT: 99.8 KG
BH CV ECHO MEAS - EDV(CUBED): 136.2 ML
BH CV ECHO MEAS - EDV(MOD-SP4): 58 ML
BH CV ECHO MEAS - EDV(TEICH): 126.4 ML
BH CV ECHO MEAS - EF(CUBED): 49.7 %
BH CV ECHO MEAS - EF(MOD-SP4): 62.1 %
BH CV ECHO MEAS - EF(TEICH): 41.6 %
BH CV ECHO MEAS - ESV(CUBED): 68.5 ML
BH CV ECHO MEAS - ESV(MOD-SP4): 22 ML
BH CV ECHO MEAS - ESV(TEICH): 73.8 ML
BH CV ECHO MEAS - FS: 20.5 %
BH CV ECHO MEAS - IVS/LVPW: 0.86
BH CV ECHO MEAS - IVSD: 1.1 CM
BH CV ECHO MEAS - IVSS: 1.2 CM
BH CV ECHO MEAS - LA DIMENSION: 3.3 CM
BH CV ECHO MEAS - LA/AO: 0.96
BH CV ECHO MEAS - LV DIASTOLIC VOL/BSA (35-75): 27.5 ML/M^2
BH CV ECHO MEAS - LV MASS(C)D: 231.2 GRAMS
BH CV ECHO MEAS - LV MASS(C)DI: 109.8 GRAMS/M^2
BH CV ECHO MEAS - LV MASS(C)S: 211.3 GRAMS
BH CV ECHO MEAS - LV MASS(C)SI: 100.4 GRAMS/M^2
BH CV ECHO MEAS - LV SYSTOLIC VOL/BSA (12-30): 10.4 ML/M^2
BH CV ECHO MEAS - LVIDD: 5.1 CM
BH CV ECHO MEAS - LVIDS: 4.1 CM
BH CV ECHO MEAS - LVLD AP4: 6.4 CM
BH CV ECHO MEAS - LVLS AP4: 5.8 CM
BH CV ECHO MEAS - LVOT AREA (M): 3.5 CM^2
BH CV ECHO MEAS - LVOT AREA: 3.5 CM^2
BH CV ECHO MEAS - LVOT DIAM: 2.1 CM
BH CV ECHO MEAS - LVPWD: 1.2 CM
BH CV ECHO MEAS - LVPWS: 1.6 CM
BH CV ECHO MEAS - MV A MAX VEL: 72.1 CM/SEC
BH CV ECHO MEAS - MV E MAX VEL: 71.1 CM/SEC
BH CV ECHO MEAS - MV E/A: 0.99
BH CV ECHO MEAS - PA ACC SLOPE: 1084 CM/SEC^2
BH CV ECHO MEAS - PA ACC TIME: 0.13 SEC
BH CV ECHO MEAS - PA PR(ACCEL): 20.4 MMHG
BH CV ECHO MEAS - RAP SYSTOLE: 10 MMHG
BH CV ECHO MEAS - RVSP: 49 MMHG
BH CV ECHO MEAS - SI(AO): 166.4 ML/M^2
BH CV ECHO MEAS - SI(CUBED): 32.2 ML/M^2
BH CV ECHO MEAS - SI(MOD-SP4): 17.1 ML/M^2
BH CV ECHO MEAS - SI(TEICH): 25 ML/M^2
BH CV ECHO MEAS - SV(AO): 350.3 ML
BH CV ECHO MEAS - SV(CUBED): 67.8 ML
BH CV ECHO MEAS - SV(MOD-SP4): 36 ML
BH CV ECHO MEAS - SV(TEICH): 52.5 ML
BH CV ECHO MEAS - TR MAX VEL: 312.4 CM/SEC
BH CV NUCLEAR PRIOR STUDY: 3
BH CV STRESS BP STAGE 1: NORMAL
BH CV STRESS BP STAGE 2: NORMAL
BH CV STRESS COMMENTS STAGE 1: NORMAL
BH CV STRESS COMMENTS STAGE 2: NORMAL
BH CV STRESS DOSE REGADENOSON STAGE 1: 0.4
BH CV STRESS DURATION MIN STAGE 1: 0
BH CV STRESS DURATION MIN STAGE 2: 4
BH CV STRESS DURATION SEC STAGE 1: 10
BH CV STRESS DURATION SEC STAGE 2: 0
BH CV STRESS HR STAGE 1: 77
BH CV STRESS HR STAGE 2: 78
BH CV STRESS PROTOCOL 1: NORMAL
BH CV STRESS RECOVERY BP: NORMAL MMHG
BH CV STRESS RECOVERY HR: 62 BPM
BH CV STRESS STAGE 1: 1
BH CV STRESS STAGE 2: 2
BNP SERPL-MCNC: 38 PG/ML (ref 0–100)
BUN BLD-MCNC: 15 MG/DL (ref 7–21)
BUN/CREAT SERPL: 16.1 (ref 7–25)
CALCIUM SPEC-SCNC: 9 MG/DL (ref 7.7–10)
CHLORIDE SERPL-SCNC: 103 MMOL/L (ref 99–112)
CHOLEST SERPL-MCNC: 129 MG/DL (ref 0–200)
CO2 SERPL-SCNC: 29.3 MMOL/L (ref 24.3–31.9)
CREAT BLD-MCNC: 0.93 MG/DL (ref 0.43–1.29)
DEPRECATED RDW RBC AUTO: 40.9 FL (ref 37–54)
EOSINOPHIL # BLD AUTO: 0.28 10*3/MM3 (ref 0–0.7)
EOSINOPHIL NFR BLD AUTO: 4 % (ref 0–7)
ERYTHROCYTE [DISTWIDTH] IN BLOOD BY AUTOMATED COUNT: 13.1 % (ref 11.5–14.5)
GFR SERPL CREATININE-BSD FRML MDRD: 59 ML/MIN/1.73
GLUCOSE BLD-MCNC: 135 MG/DL (ref 70–110)
GLUCOSE BLDC GLUCOMTR-MCNC: 131 MG/DL (ref 70–130)
GLUCOSE BLDC GLUCOMTR-MCNC: 163 MG/DL (ref 70–130)
GLUCOSE BLDC GLUCOMTR-MCNC: 170 MG/DL (ref 70–130)
GLUCOSE BLDC GLUCOMTR-MCNC: 199 MG/DL (ref 70–130)
GLUCOSE BLDC GLUCOMTR-MCNC: 90 MG/DL (ref 70–130)
HBA1C MFR BLD: 6.8 % (ref 4.5–5.7)
HCT VFR BLD AUTO: 35.8 % (ref 37–47)
HDLC SERPL-MCNC: 33 MG/DL (ref 60–100)
HGB BLD-MCNC: 10.9 G/DL (ref 12–16)
IMM GRANULOCYTES # BLD: 0.07 10*3/MM3 (ref 0–0.03)
IMM GRANULOCYTES NFR BLD: 1 % (ref 0–0.5)
LDLC SERPL CALC-MCNC: 72 MG/DL (ref 0–100)
LDLC/HDLC SERPL: 2.19 {RATIO}
LV EF NUC BP: 66 %
LYMPHOCYTES # BLD AUTO: 2.24 10*3/MM3 (ref 1–3)
LYMPHOCYTES NFR BLD AUTO: 31.8 % (ref 16–46)
MAGNESIUM SERPL-MCNC: 2 MG/DL (ref 1.7–2.6)
MAXIMAL PREDICTED HEART RATE: 148 BPM
MAXIMAL PREDICTED HEART RATE: 148 BPM
MCH RBC QN AUTO: 26.7 PG (ref 27–33)
MCHC RBC AUTO-ENTMCNC: 30.4 G/DL (ref 33–37)
MCV RBC AUTO: 87.5 FL (ref 80–94)
MONOCYTES # BLD AUTO: 0.6 10*3/MM3 (ref 0.1–0.9)
MONOCYTES NFR BLD AUTO: 8.5 % (ref 0–12)
NEUTROPHILS # BLD AUTO: 3.81 10*3/MM3 (ref 1.4–6.5)
NEUTROPHILS NFR BLD AUTO: 54.1 % (ref 40–75)
OSMOLALITY SERPL CALC.SUM OF ELEC: 284.1 MOSM/KG (ref 273–305)
PERCENT MAX PREDICTED HR: 50 %
PLATELET # BLD AUTO: 268 10*3/MM3 (ref 130–400)
PMV BLD AUTO: 10.9 FL (ref 6–10)
POTASSIUM BLD-SCNC: 3.6 MMOL/L (ref 3.5–5.3)
RBC # BLD AUTO: 4.09 10*6/MM3 (ref 4.2–5.4)
SODIUM BLD-SCNC: 141 MMOL/L (ref 135–153)
STRESS BASELINE BP: NORMAL MMHG
STRESS BASELINE HR: 50 BPM
STRESS PERCENT HR: 59 %
STRESS POST PEAK BP: NORMAL MMHG
STRESS POST PEAK HR: 74 BPM
STRESS TARGET HR: 126 BPM
STRESS TARGET HR: 126 BPM
TRIGL SERPL-MCNC: 119 MG/DL (ref 0–150)
TROPONIN I SERPL-MCNC: <0.006 NG/ML
TROPONIN I SERPL-MCNC: <0.006 NG/ML
TSH SERPL DL<=0.05 MIU/L-ACNC: 3.31 MIU/ML (ref 0.55–4.78)
VLDLC SERPL-MCNC: 23.8 MG/DL
WBC NRBC COR # BLD: 7.04 10*3/MM3 (ref 4.5–12.5)

## 2018-12-13 PROCEDURE — 84484 ASSAY OF TROPONIN QUANT: CPT | Performed by: NURSE PRACTITIONER

## 2018-12-13 PROCEDURE — 82962 GLUCOSE BLOOD TEST: CPT

## 2018-12-13 PROCEDURE — G0378 HOSPITAL OBSERVATION PER HR: HCPCS

## 2018-12-13 PROCEDURE — 83735 ASSAY OF MAGNESIUM: CPT | Performed by: NURSE PRACTITIONER

## 2018-12-13 PROCEDURE — 78452 HT MUSCLE IMAGE SPECT MULT: CPT

## 2018-12-13 PROCEDURE — 93306 TTE W/DOPPLER COMPLETE: CPT

## 2018-12-13 PROCEDURE — 93018 CV STRESS TEST I&R ONLY: CPT | Performed by: INTERNAL MEDICINE

## 2018-12-13 PROCEDURE — 85025 COMPLETE CBC W/AUTO DIFF WBC: CPT | Performed by: NURSE PRACTITIONER

## 2018-12-13 PROCEDURE — 83036 HEMOGLOBIN GLYCOSYLATED A1C: CPT | Performed by: NURSE PRACTITIONER

## 2018-12-13 PROCEDURE — 99214 OFFICE O/P EST MOD 30 MIN: CPT | Performed by: INTERNAL MEDICINE

## 2018-12-13 PROCEDURE — 78452 HT MUSCLE IMAGE SPECT MULT: CPT | Performed by: INTERNAL MEDICINE

## 2018-12-13 PROCEDURE — 84443 ASSAY THYROID STIM HORMONE: CPT | Performed by: NURSE PRACTITIONER

## 2018-12-13 PROCEDURE — 94799 UNLISTED PULMONARY SVC/PX: CPT

## 2018-12-13 PROCEDURE — 93010 ELECTROCARDIOGRAM REPORT: CPT | Performed by: INTERNAL MEDICINE

## 2018-12-13 PROCEDURE — A9500 TC99M SESTAMIBI: HCPCS | Performed by: INTERNAL MEDICINE

## 2018-12-13 PROCEDURE — 93017 CV STRESS TEST TRACING ONLY: CPT

## 2018-12-13 PROCEDURE — 80061 LIPID PANEL: CPT | Performed by: NURSE PRACTITIONER

## 2018-12-13 PROCEDURE — 83880 ASSAY OF NATRIURETIC PEPTIDE: CPT | Performed by: NURSE PRACTITIONER

## 2018-12-13 PROCEDURE — 80048 BASIC METABOLIC PNL TOTAL CA: CPT | Performed by: NURSE PRACTITIONER

## 2018-12-13 PROCEDURE — 0 TECHNETIUM SESTAMIBI: Performed by: INTERNAL MEDICINE

## 2018-12-13 PROCEDURE — 25010000002 REGADENOSON 0.4 MG/5ML SOLUTION: Performed by: INTERNAL MEDICINE

## 2018-12-13 PROCEDURE — 93306 TTE W/DOPPLER COMPLETE: CPT | Performed by: INTERNAL MEDICINE

## 2018-12-13 PROCEDURE — 93005 ELECTROCARDIOGRAM TRACING: CPT | Performed by: NURSE PRACTITIONER

## 2018-12-13 PROCEDURE — 63710000001 INSULIN ASPART PER 5 UNITS: Performed by: NURSE PRACTITIONER

## 2018-12-13 RX ORDER — AMLODIPINE BESYLATE 5 MG/1
5 TABLET ORAL
Status: DISCONTINUED | OUTPATIENT
Start: 2018-12-13 | End: 2018-12-14 | Stop reason: HOSPADM

## 2018-12-13 RX ORDER — ATENOLOL 25 MG/1
12.5 TABLET ORAL
Status: DISCONTINUED | OUTPATIENT
Start: 2018-12-13 | End: 2018-12-14 | Stop reason: HOSPADM

## 2018-12-13 RX ADMIN — NITROGLYCERIN 0.5 INCH: 20 OINTMENT TOPICAL at 06:29

## 2018-12-13 RX ADMIN — BUDESONIDE AND FORMOTEROL FUMARATE DIHYDRATE 2 PUFF: 80; 4.5 AEROSOL RESPIRATORY (INHALATION) at 19:01

## 2018-12-13 RX ADMIN — TECHNETIUM TC 99M SESTAMIBI 1 DOSE: 1 INJECTION INTRAVENOUS at 10:55

## 2018-12-13 RX ADMIN — FERROUS SULFATE TAB 325 MG (65 MG ELEMENTAL FE) 325 MG: 325 (65 FE) TAB at 20:32

## 2018-12-13 RX ADMIN — POTASSIUM CHLORIDE 20 MEQ: 1500 TABLET, EXTENDED RELEASE ORAL at 14:32

## 2018-12-13 RX ADMIN — FUROSEMIDE 20 MG: 20 TABLET ORAL at 14:33

## 2018-12-13 RX ADMIN — INSULIN ASPART 2 UNITS: 100 INJECTION, SOLUTION INTRAVENOUS; SUBCUTANEOUS at 17:43

## 2018-12-13 RX ADMIN — ALBUTEROL SULFATE 2.5 MG: 2.5 SOLUTION RESPIRATORY (INHALATION) at 19:01

## 2018-12-13 RX ADMIN — SODIUM CHLORIDE, PRESERVATIVE FREE 3 ML: 5 INJECTION INTRAVENOUS at 08:28

## 2018-12-13 RX ADMIN — TECHNETIUM TC 99M SESTAMIBI 1 DOSE: 1 INJECTION INTRAVENOUS at 13:08

## 2018-12-13 RX ADMIN — APIXABAN 5 MG: 5 TABLET, FILM COATED ORAL at 20:33

## 2018-12-13 RX ADMIN — LISINOPRIL 5 MG: 2.5 TABLET ORAL at 14:33

## 2018-12-13 RX ADMIN — ATORVASTATIN CALCIUM 20 MG: 20 TABLET, FILM COATED ORAL at 14:34

## 2018-12-13 RX ADMIN — PANTOPRAZOLE SODIUM 40 MG: 40 TABLET, DELAYED RELEASE ORAL at 14:33

## 2018-12-13 RX ADMIN — ASPIRIN 81 MG: 81 TABLET ORAL at 14:34

## 2018-12-13 RX ADMIN — REGADENOSON 0.4 MG: 0.08 INJECTION, SOLUTION INTRAVENOUS at 13:08

## 2018-12-13 RX ADMIN — ATENOLOL 12.5 MG: 25 TABLET ORAL at 14:32

## 2018-12-13 RX ADMIN — ALBUTEROL SULFATE 2.5 MG: 2.5 SOLUTION RESPIRATORY (INHALATION) at 07:08

## 2018-12-13 RX ADMIN — BUDESONIDE AND FORMOTEROL FUMARATE DIHYDRATE 2 PUFF: 80; 4.5 AEROSOL RESPIRATORY (INHALATION) at 07:18

## 2018-12-13 NOTE — H&P
HISTORY AND PHYSICAL        Patient Identification:  Name:  Mayra Hays  Age:  73 y.o.  Sex:  female  :  1945  MRN:  [id number]   Visit Number:  [id number]  Primary Care Physician:  Elena Garcia APRN       Subjective     Subjective     Chief complaint:     Chief Complaint   Patient presents with   • Chest Pain     Pt reports that she is having chest pain ,left sided with radiation into the neck.       History of presenting illness:     The patient is a 73 year old female who presented to the ED on 18 of chest pain. She reports left lateral chest pain radiating to the left neck. She denies any shortness of breath, nausea, vomiting or diaphoresis. She reports history of COPD. Admitted to the observation unit for further evaluation. Troponin level continues to be negative at <0.006. Normal BNP. Denies any chest pain this morning.       ---------------------------------------------------------------------------------------------------------------------     Review Of Systems:    Constitutional: no fever, chills and night sweats. No appetite change or unexpected weight change. No fatigue.  Eyes: no eye drainage, itching or redness.  HEENT: no mouth sores, dysphagia or nose bleed.  Respiratory: no for shortness of breath, cough or production of sputum.  Cardiovascular: Chest pain- resolved, no palpitations, no orthopnea.  Gastrointestinal: no nausea, vomiting or diarrhea. No abdominal pain, hematemesis or rectal bleeding.  Genitourinary: no dysuria or polyuria.  Hematologic/lymphatic: no lymph node abnormalities, no easy bruising or easy bleeding.  Musculoskeletal: no muscle or joint pain.  Skin: No rash and no itching.  Neurological: no loss of consciousness, no seizure, no headache.  Behavioral/Psych: no depression or suicidal ideation.  Endocrine: no hot flashes.  Immunologic:  negative.    ---------------------------------------------------------------------------------------------------------------------     Past Medical History    Past Medical History:   Diagnosis Date   • Collapsed lung    • COPD (chronic obstructive pulmonary disease) (CMS/[facility])    • Diabetes mellitus (CMS/[facility])    • GERD (gastroesophageal reflux disease)    • Hyperlipidemia    • Hypertension    • Myocardial infarction (CMS/[facility])        Past Surgical History    Past Surgical History:   Procedure Laterality Date   • GALLBLADDER SURGERY         Family History    History reviewed. No pertinent family history.    Social History    Social History     Tobacco Use   • Smoking status: Former Smoker     Packs/day: 3.00     Types: Cigarettes     Last attempt to quit: 2015     Years since quitting: 3.9   • Smokeless tobacco: Never Used   Substance Use Topics   • Alcohol use: No   • Drug use: No       Allergies    Patient has no known allergies.  ---------------------------------------------------------------------------------------------------------------------     Home Medications:    Prior to Admission Medications     Prescriptions Last Dose Informant Patient Reported? Taking?    albuterol 108 (90 Base) MCG/ACT inhaler 12/12/2018 Pharmacy Yes Yes    Inhale 2 puffs 2 (Two) Times a Day.    atenolol (TENORMIN) 25 MG tablet 12/12/2018 Pharmacy Yes Yes    Take 25 mg by mouth Daily.    atorvastatin (LIPITOR) 20 MG tablet 12/12/2018 Pharmacy Yes Yes    Take 20 mg by mouth Daily.    esomeprazole (nexIUM) 20 MG capsule 12/12/2018 Pharmacy Yes Yes    Take 20 mg by mouth Every Morning Before Breakfast.    ferrous sulfate 325 (65 FE) MG tablet 12/12/2018 Pharmacy Yes Yes    Take 325 mg by mouth 2 (Two) Times a Day.    Fluticasone Furoate-Vilanterol (BREO ELLIPTA) 100-25 MCG/INH aerosol powder  12/12/2018 Pharmacy No Yes    Inhale 1 puff Daily.    furosemide (LASIX) 20 MG tablet 12/12/2018 Pharmacy Yes Yes    Take 20 mg by mouth Daily.     ipratropium-albuterol (DUO-NEB) 0.5-2.5 mg/3 ml nebulizer Past Week Pharmacy No Yes    Take 3 mL by nebulization Every 4 (Four) Hours As Needed for Wheezing.    lisinopril (PRINIVIL,ZESTRIL) 5 MG tablet 12/12/2018 Pharmacy Yes Yes    Take 5 mg by mouth Daily.    metFORMIN (GLUCOPHAGE) 500 MG tablet 12/12/2018 Pharmacy Yes Yes    Take 500 mg by mouth Daily.    pantoprazole (PROTONIX) 40 MG EC tablet 12/12/2018 Pharmacy Yes Yes    Take 40 mg by mouth Daily.    potassium chloride (K-DUR,KLOR-CON) 20 MEQ CR tablet 12/12/2018 Pharmacy Yes Yes    Take 20 mEq by mouth Daily.    apixaban (ELIQUIS) 5 MG tablet tablet Unknown Other No No    Take 1 tablet by mouth Every 12 (Twelve) Hours.        ---------------------------------------------------------------------------------------------------------------------    Objective     Objective     Hospital Scheduled Meds:    albuterol 2.5 mg Nebulization BID   apixaban 5 mg Oral Q12H   aspirin 81 mg Oral Daily   atenolol 25 mg Oral Daily   atorvastatin 20 mg Oral Daily   budesonide-formoterol 2 puff Inhalation BID - RT   ferrous sulfate 325 mg Oral BID   furosemide 20 mg Oral Daily   insulin aspart 0-7 Units Subcutaneous 4x Daily AC & at Bedtime   lisinopril 5 mg Oral Daily   nitroglycerin 0.5 inch Topical Q6H   pantoprazole 40 mg Oral QAM   pantoprazole 40 mg Oral Daily   potassium chloride 20 mEq Oral Daily   sodium chloride 3 mL Intravenous Q12H        ---------------------------------------------------------------------------------------------------------------------   Vital Signs:  Temp:  [97.4 °F (36.3 °C)-98.7 °F (37.1 °C)] 98.1 °F (36.7 °C)  Heart Rate:  [42-69] 49  Resp:  [17-20] 18  BP: (110-160)/(53-89) 110/54  Mean Arterial Pressure (Non-Invasive) for the past 24 hrs (Last 3 readings):   Noninvasive MAP (mmHg)   12/13/18 0700 87   12/13/18 0400 77   12/12/18 2335 72     SpO2 Percentage    12/13/18 0708 12/13/18 0716 12/13/18 0718   SpO2: 92% 96% 94%     SpO2:  [92 %-100  %] 94 %  on  Flow (L/min):  [2] 2;   Device (Oxygen Therapy): room air    Body mass index is 34.52 kg/m².  Wt Readings from Last 3 Encounters:   12/12/18 100 kg (220 lb 7.4 oz)   11/27/18 101 kg (221 lb 9.6 oz)   11/07/18 99.8 kg (220 lb)     ---------------------------------------------------------------------------------------------------------------------     Physical Exam:    Constitutional:  Well-developed and well-nourished.  No respiratory distress.      HENT:  Head: Normocephalic and atraumatic.  Mouth:  Moist mucous membranes.    Eyes:  Conjunctivae and EOM are normal.  No scleral icterus.  Neck:  Neck supple.  No JVD present.    Cardiovascular:  Normal rate, regular rhythm and normal heart sounds with no murmur. No edema.  Pulmonary/Chest:  No respiratory distress, no wheezes, no crackles, with normal breath sounds and good air movement.  Abdominal:  Soft.  Bowel sounds are normal.  No distension and no tenderness.   Musculoskeletal:  No edema, no tenderness, and no deformity.  No swelling or redness of joints.  Neurological:  Alert and oriented to person, place, and time.  No facial droop.  No slurred speech.   Skin:  Skin is warm and dry.  No rash noted.  No pallor.   Psychiatric:  Normal mood and affect.  Behavior is normal.    ---------------------------------------------------------------------------------------------------------------------  I have personally reviewed the EKG/Telemetry strip  ---------------------------------------------------------------------------------------------------------------------   Results from last 7 days   Lab Units  12/13/18   0740  12/13/18   0109  12/12/18   1940   TROPONIN I ng/mL  <0.006  <0.006  <0.006       Results from last 7 days   Lab Units  12/13/18   0109   CHOLESTEROL mg/dL  129   TRIGLYCERIDES mg/dL  119   HDL CHOL mg/dL  33*   LDL CHOL mg/dL  72     Results from last 7 days   Lab Units  12/12/18   1542   PH, ARTERIAL pH units  7.399   PO2 ART mm Hg  65.2*    PCO2, ARTERIAL mm Hg  47.8*   HCO3 ART mmol/L  28.9*     Results from last 7 days   Lab Units  12/13/18   0109 12/12/18 1940 12/12/18   1439   WBC 10*3/mm3  7.04  7.60  7.19   HEMOGLOBIN g/dL  10.9*  11.4*  13.2   HEMATOCRIT %  35.8*  36.7*  42.7   MCV fL  87.5  86.4  86.1   MCHC g/dL  30.4*  31.1*  30.9*   PLATELETS 10*3/mm3  268  278  323     Results from last 7 days   Lab Units  12/13/18   0109 12/12/18 1940 12/12/18   1439   SODIUM mmol/L  141  141  142   POTASSIUM mmol/L  3.6  4.0  4.1   MAGNESIUM mg/dL  2.0  1.8   --    CHLORIDE mmol/L  103  103  103   CO2 mmol/L  29.3  30.6  29.2   BUN mg/dL  15  15  14   CREATININE mg/dL  0.93  0.87  0.89   EGFR IF NONAFRICN AM mL/min/1.73  59*  64  62   CALCIUM mg/dL  9.0  9.2  9.6   GLUCOSE mg/dL  135*  128*  93   ALBUMIN g/dL   --    --   4.70   BILIRUBIN mg/dL   --    --   0.3   ALK PHOS U/L   --    --   139*   AST (SGOT) U/L   --    --   19   ALT (SGPT) U/L   --    --   24   Estimated Creatinine Clearance: 66.5 mL/min (by C-G formula based on SCr of 0.93 mg/dL).  No results found for: AMMONIA    Hemoglobin A1C   Date/Time Value Ref Range Status   12/13/2018 0109 6.80 (H) 4.50 - 5.70 % Final     Glucose   Date/Time Value Ref Range Status   12/13/2018 0703 90 70 - 130 mg/dL Final   12/12/2018 2202 170 (H) 70 - 130 mg/dL Final   12/12/2018 2004 141 (H) 70 - 130 mg/dL Final     Lab Results   Component Value Date    HGBA1C 6.80 (H) 12/13/2018     Lab Results   Component Value Date    TSH 3.310 12/13/2018    FREET4 1.88 (H) 05/31/2017       Blood Culture   Date Value Ref Range Status   12/12/2018 No growth at less than 24 hours  Preliminary   12/12/2018 No growth at less than 24 hours  Preliminary                 Pain Management Panel     Pain Management Panel Latest Ref Rng & Units 5/30/2017    AMPHETAMINES SCREEN, URINE Negative Negative    BARBITURATES SCREEN Negative Negative    BENZODIAZEPINE SCREEN, URINE Negative Negative    BUPRENORPHINE Negative Negative     COCAINE SCREEN, URINE Negative Negative    METHADONE SCREEN, URINE Negative Negative        I have personally reviewed the above laboratory results.   ---------------------------------------------------------------------------------------------------------------------  Imaging Results (last 7 days)     Procedure Component Value Units Date/Time    XR Chest 1 View [[id number]] Collected:  12/12/18 1531     Updated:  12/12/18 1533    Narrative:       EXAMINATION: XR CHEST 1 VW-      CLINICAL INDICATION:     Chest Pain triage protocol     TECHNIQUE:  XR CHEST 1 VW-      COMPARISON: 11/7/2018      FINDINGS:        Lungs are aerated.   Cardiomegaly is noted.   No pneumothorax.   No effusions.   No acute osseous findings.            Impression:       Stable chest. No acute cardiopulmonary findings.     This report was finalized on 12/12/2018 3:31 PM by Dr. Kunal Castanon MD.           I have personally reviewed the above radiology results.   ---------------------------------------------------------------------------------------------------------------------      Assessment & Plan        Assessment/Plan       ASSESSMENT:    1. Bradycardia  2. Chest pain    PLAN:    Case discussed with Dr. Matias as the patient sees him as outpatient. As her heart rate is >45 he recommends discontinuing Atenolol and initiating Amlodopine. Cardiac enzymes are negative with EKG showing sinus bradycardia. Denies any chest pain today.     Eliquis 5 mg PO BID.       Patient's findings and recommendations were discussed with patient and nursing staff      Code Status:   Code Status and Medical Interventions:   Ordered at: 12/12/18 1934     Level Of Support Discussed With:    Patient     Code Status:    CPR     Medical Interventions (Level of Support Prior to Arrest):    Full           Rosalia Pinto PA-C  12/13/18  9:02 AM

## 2018-12-13 NOTE — PLAN OF CARE
Problem: Fall Risk (Adult)  Goal: Identify Related Risk Factors and Signs and Symptoms  Outcome: Ongoing (interventions implemented as appropriate)    Goal: Absence of Fall  Outcome: Ongoing (interventions implemented as appropriate)      Problem: Patient Care Overview  Goal: Plan of Care Review  Outcome: Ongoing (interventions implemented as appropriate)    Goal: Individualization and Mutuality  Outcome: Ongoing (interventions implemented as appropriate)    Goal: Discharge Needs Assessment  Outcome: Ongoing (interventions implemented as appropriate)    Goal: Interprofessional Rounds/Family Conf  Outcome: Ongoing (interventions implemented as appropriate)      Problem: Hyperglycemia, Persistent (Adult)  Goal: Signs and Symptoms of Listed Potential Problems Will be Absent, Minimized or Managed (Hyperglycemia, Persistent)  Outcome: Ongoing (interventions implemented as appropriate)      Problem: Cardiac: ACS (Acute Coronary Syndrome) (Adult)  Goal: Signs and Symptoms of Listed Potential Problems Will be Absent, Minimized or Managed (Cardiac: ACS)  Outcome: Ongoing (interventions implemented as appropriate)      Problem: Patient Care Overview  Goal: Plan of Care Review  Outcome: Ongoing (interventions implemented as appropriate)    Goal: Individualization and Mutuality  Outcome: Ongoing (interventions implemented as appropriate)    Goal: Discharge Needs Assessment  Outcome: Ongoing (interventions implemented as appropriate)    Goal: Interprofessional Rounds/Family Conf  Outcome: Ongoing (interventions implemented as appropriate)

## 2018-12-13 NOTE — PROGRESS NOTES
Pharmacy checked on pricing of Eliquis, which is a home medication. The patient last picked up Eliquis on 11/15 for $3.70.    Thank you,    Zakiya Frausto [facility]  12/13/18  9:36 AM

## 2018-12-13 NOTE — CONSULTS
Inpatient Cardiology Consult  Consult performed by: Jatinder Matias MD  Consult ordered by: Sheryl Mcgee APRN        Date of Admit: 12/12/2018  Date of Consult: 12/13/18  No ref. provider found  Mayra Hays  1945  Consulting Physician: Dr. Jatinder Matias MD, [facility]    Cardiology consultation    Reason for consultation: chest pain    Assessment:  1. Chest pains with features concerning for Class III Angina.  2. Dyspnea (NYHA class III) which could be angina equivalent as well.  3. Paroxysmal atrial fibrillation, maintaining sinus rhythm on Eliquis for anticoagulation with CHADSVASc score of 4  4. Essential Hypertension, controlled  5. Hyperlipidemia on statin therapy  6. Asymptomatic bradycardia on atenolol  7. Diabetes Mellitus Type 2      Recommendations:  1. Continue low dose aspirin, statin, and lisinopril,  2. Will decrease the dose of atenolol to 12.5 mg daily due to sinus bradycardia in the 40s.  3. Will evaluate chest pains further with a lexiscan sestamibi and echo Doppler study today      Subjective     History of Present Illness    Mayra Hays is a 73 y.o. female with past medical history significant for Diabetes mellitus type 2, COPD, hypertension, and hyperlipidemia. She presented to the ED on 12/12/18 due to chest pains. Upon presentation to the ED troponin was normal. EKG did not reveal any changes consistent with acute ischemia. The chest x-ray was unremarkable. Cardiology was consulted. She states the chest pains began several days ago. This is described as a severe pain in the left side of her chest which often radiates to the right chest wall and the left shoulder. She often has associated diaphoresis and dyspnea. The pains occur with exertion and are alleviated often with rest. She has tried nitroglycerin for the chest pains which provided relief at times. She reports dyspnea with moderate exertion such as walking uphill. She has had 2 pillow orthopnea. An echocardiogram in June of 2018  revealed normal EF with grade 1 diastolic dysfunction and no valvular abnormality. A cardiac catheterization in August of 2017 revealed no coronary artery disease. She is a diabetic. She was a smoker for many years, but quit smoking 3 years ago. She denies family history of premature coronary artery disease.       Cardiac risk factors:diabetes mellitus, hypercholesterolemia, hypertension and Obesity    Last Echo: 6/25/18  · The study is technically difficult for diagnosis.  · Left ventricular systolic function is normal. Estimated EF = 60%.  · Left ventricular diastolic dysfunction (grade I) consistent with impaired relaxation.  · There are no significant valvular abnormalities noted.  · There is no evidence of pericardial effusion.    Last Stress: 8/9/2017  · Myocardial perfusion imaging indicates a medium-to-large-sized, moderately degree of schemia located in the anterior wall, apex and lateral wall.  · Normal LV cavity size. Normal LV wall motion noted.  · Left ventricular ejection fraction is normal (Calculated EF = 70%).  · Impressions are consistent with a high risk study.  · Comments: Consider further evaluation with cardiac catheterization.    Last Cath: 8/22/2017  Coronary angiograms:     Left main coronary artery is normal and bifurcates into a medium-sized left and descending and left circumflex systems.     Left anterior descending coronary artery is angiographically normal including the diagonal branches.     Left circumflex coronary artery is angiographically normal including the obtuse marginal branches     Right coronary artery is dominant for posterior circulation and is normal as well.     Recommendations: In view of absence of any coronary artery disease, we'll continue to emphasize on risk factor modification as needed for now and discontinue the aspirin and Plavix.        Jatinder Matias M.D. [facility]      Past Medical History:   Diagnosis Date   • Collapsed lung    • COPD (chronic obstructive  pulmonary disease) (CMS/HCC)    • Diabetes mellitus (CMS/HCC)    • GERD (gastroesophageal reflux disease)    • Hyperlipidemia    • Hypertension    • Myocardial infarction (CMS/HCC)      Past Surgical History:   Procedure Laterality Date   • GALLBLADDER SURGERY       History reviewed. No pertinent family history.  Social History     Tobacco Use   • Smoking status: Former Smoker     Packs/day: 3.00     Types: Cigarettes     Last attempt to quit: 2015     Years since quitting: 3.9   • Smokeless tobacco: Never Used   Substance Use Topics   • Alcohol use: No   • Drug use: No     Medications Prior to Admission   Medication Sig Dispense Refill Last Dose   • albuterol 108 (90 Base) MCG/ACT inhaler Inhale 2 puffs 2 (Two) Times a Day.   12/12/2018 at 1000   • atenolol (TENORMIN) 25 MG tablet Take 25 mg by mouth Daily.   12/12/2018 at 1000   • atorvastatin (LIPITOR) 20 MG tablet Take 20 mg by mouth Daily.   12/12/2018 at 1000   • esomeprazole (nexIUM) 20 MG capsule Take 20 mg by mouth Every Morning Before Breakfast.   12/12/2018 at 1000   • ferrous sulfate 325 (65 FE) MG tablet Take 325 mg by mouth 2 (Two) Times a Day.   12/12/2018 at 1000   • Fluticasone Furoate-Vilanterol (BREO ELLIPTA) 100-25 MCG/INH aerosol powder  Inhale 1 puff Daily. 1 each 6 12/12/2018 at 1000   • furosemide (LASIX) 20 MG tablet Take 20 mg by mouth Daily.   12/12/2018 at 1000   • ipratropium-albuterol (DUO-NEB) 0.5-2.5 mg/3 ml nebulizer Take 3 mL by nebulization Every 4 (Four) Hours As Needed for Wheezing. 120 vial 11 Past Week at Unknown time   • lisinopril (PRINIVIL,ZESTRIL) 5 MG tablet Take 5 mg by mouth Daily.   12/12/2018 at 1000   • metFORMIN (GLUCOPHAGE) 500 MG tablet Take 500 mg by mouth Daily.   12/12/2018 at 1000   • pantoprazole (PROTONIX) 40 MG EC tablet Take 40 mg by mouth Daily.   12/12/2018 at 1000   • potassium chloride (K-DUR,KLOR-CON) 20 MEQ CR tablet Take 20 mEq by mouth Daily.   12/12/2018 at 1000   • apixaban (ELIQUIS) 5 MG tablet  tablet Take 1 tablet by mouth Every 12 (Twelve) Hours. 60 tablet 5 Unknown at Unknown time     Allergies:  Patient has no known allergies.      Current Facility-Administered Medications:   •  acetaminophen (TYLENOL) tablet 650 mg, 650 mg, Oral, Q4H PRN, Sheryl Mcgee APRN  •  albuterol (PROVENTIL) nebulizer solution 0.083% 2.5 mg/3mL, 2.5 mg, Nebulization, BID, James Mercer MD, 2.5 mg at 12/13/18 0708  •  amLODIPine (NORVASC) tablet 5 mg, 5 mg, Oral, Q24H, Rosalia Pinto PA-C  •  apixaban (ELIQUIS) tablet 5 mg, 5 mg, Oral, Q12H, James Mercer MD, Stopped at 12/13/18 0808  •  [DISCONTINUED] aspirin chewable tablet 81 mg, 81 mg, Oral, Once **AND** aspirin EC tablet 81 mg, 81 mg, Oral, Daily, Sheryl Mcgee APRN, Stopped at 12/13/18 0809  •  atorvastatin (LIPITOR) tablet 20 mg, 20 mg, Oral, Daily, James Mercer MD, Stopped at 12/13/18 0809  •  budesonide-formoterol (SYMBICORT) 80-4.5 MCG/ACT inhaler 2 puff, 2 puff, Inhalation, BID - RT, James Mercer MD, 2 puff at 12/13/18 0718  •  calcium carbonate (TUMS) chewable tablet 500 mg (200 mg elemental), 2 tablet, Oral, BID PRN, Sheryl Mcgee APRN  •  dextrose (D50W) 25 g/ 50mL Intravenous Solution 25 g, 25 g, Intravenous, Q15 Min PRN, Sheryl Mcgee APRN  •  dextrose (GLUTOSE) oral gel 15 g, 15 g, Oral, Q15 Min PRN, Sheryl Mcgee APRN  •  ferrous sulfate tablet 325 mg, 325 mg, Oral, BID, James Mercer MD, 325 mg at 12/12/18 2057  •  furosemide (LASIX) tablet 20 mg, 20 mg, Oral, Daily, James Mercer MD, Stopped at 12/13/18 0810  •  glucagon (human recombinant) (GLUCAGEN DIAGNOSTIC) injection 1 mg, 1 mg, Subcutaneous, PRN, Sheryl Mcgee APRN  •  insulin aspart (novoLOG) injection 0-7 Units, 0-7 Units, Subcutaneous, 4x Daily AC & at Bedtime, Sheryl Mcgee APRN, 2 Units at 12/12/18 2224  •  ipratropium-albuterol (DUO-NEB) nebulizer solution 3 mL, 3 mL, Nebulization, Q4H PRN, James Mercer MD  •  lisinopril  (PRINIVIL,ZESTRIL) tablet 5 mg, 5 mg, Oral, Daily, James Mercer MD, Stopped at 12/13/18 0810  •  magnesium hydroxide (MILK OF MAGNESIA) suspension 2400 mg/10mL 10 mL, 10 mL, Oral, Daily PRN, Sheryl Mcgee APRN  •  Magnesium Sulfate 2 gram Bolus, followed by 8 gram infusion (total Mg dose 10 grams)- Mg less than or equal to 1mg/dL, 2 g, Intravenous, PRN **OR** Magnesium Sulfate 2 gram / 50mL Infusion (GIVE X 3 BAGS TO EQUAL 6GM TOTAL DOSE) - Mg 1.1 - 1.5 mg/dl, 2 g, Intravenous, PRN **OR** Magnesium Sulfate 4 gram infusion- Mg 1.6-1.9 mg/dL, 4 g, Intravenous, PRN, Sheryl Mcgee APRN  •  nitroglycerin (NITROSTAT) ointment 0.5 inch, 0.5 inch, Topical, Q6H, Sheryl Mcgee APRN, 0.5 inch at 12/13/18 0629  •  nitroglycerin (NITROSTAT) SL tablet 0.4 mg, 0.4 mg, Sublingual, Q5 Min PRN, Sheryl Mcgee APRN  •  ondansetron (ZOFRAN) tablet 4 mg, 4 mg, Oral, Q6H PRN **OR** ondansetron ODT (ZOFRAN-ODT) disintegrating tablet 4 mg, 4 mg, Oral, Q6H PRN **OR** ondansetron (ZOFRAN) injection 4 mg, 4 mg, Intravenous, Q6H PRN, Sheryl Mcgee APRN  •  pantoprazole (PROTONIX) EC tablet 40 mg, 40 mg, Oral, QAM, James Mercer MD  •  pantoprazole (PROTONIX) EC tablet 40 mg, 40 mg, Oral, Daily, James Mercer MD, Stopped at 12/13/18 0811  •  potassium chloride (K-DUR,KLOR-CON) CR tablet 20 mEq, 20 mEq, Oral, Daily, James Mercer MD, Stopped at 12/13/18 0811  •  potassium chloride (MICRO-K) CR capsule 40 mEq, 40 mEq, Oral, PRN **OR** potassium chloride (KLOR-CON) packet 40 mEq, 40 mEq, Oral, PRN **OR** potassium chloride 10 mEq in 100 mL IVPB, 10 mEq, Intravenous, Q1H PRN, Sheryl Mcgee APRN  •  sodium chloride 0.9 % flush 10 mL, 10 mL, Intravenous, PRN, Dong Patel MD  •  [COMPLETED] Insert peripheral IV, , , Once **AND** sodium chloride 0.9 % flush 10 mL, 10 mL, Intravenous, PRN, Dong Patel MD  •  sodium chloride 0.9 % flush 3 mL, 3 mL, Intravenous, Q12H, Sheryl Mcgee APRN, 3 mL at 12/13/18  0828  •  sodium chloride 0.9 % flush 3-10 mL, 3-10 mL, Intravenous, PRN, Sheryl Mcgee, ERASTO    Review of Systems   Constitutional: Positive for diaphoresis and fatigue.   HENT: Negative for congestion and nosebleeds.    Respiratory: Positive for shortness of breath. Negative for cough and wheezing.    Cardiovascular: Positive for chest pain. Negative for palpitations and leg swelling.   Gastrointestinal: Negative for abdominal pain, nausea and vomiting.   Genitourinary: Negative for dysuria and urgency.   Skin: Negative for rash and wound.   Neurological: Negative for dizziness, syncope and light-headedness.         Objective      Vital Signs  Temp:  [97.4 °F (36.3 °C)-98.7 °F (37.1 °C)] 98.1 °F (36.7 °C)  Heart Rate:  [42-69] 49  Resp:  [17-20] 18  BP: (110-160)/(53-89) 110/54  Body mass index is 34.52 kg/m².    Intake/Output Summary (Last 24 hours) at 12/13/2018 0940  Last data filed at 12/12/2018 1840  Gross per 24 hour   Intake 300 ml   Output --   Net 300 ml       Physical Exam   Constitutional: She is oriented to person, place, and time. She appears well-developed and well-nourished.   HENT:   Head: Normocephalic and atraumatic.   Cardiovascular: Regular rhythm and normal heart sounds. Bradycardia present. Exam reveals no S3 and no S4.   No murmur heard.  Pulses:       Dorsalis pedis pulses are 1+ on the right side, and 1+ on the left side.        Posterior tibial pulses are 2+ on the right side, and 2+ on the left side.   Pulmonary/Chest: Effort normal and breath sounds normal. She has no wheezes. She has no rales.   Abdominal: Soft. Bowel sounds are normal. There is tenderness (diffuse).   Musculoskeletal: She exhibits no edema.   Neurological: She is alert and oriented to person, place, and time.   Skin: Skin is warm and dry.   Psychiatric: She has a normal mood and affect. Her behavior is normal.         Results Review:   I reviewed the patient's new clinical results.  Results from last 7 days   Lab  Units  18   0740  18   0109  18   1940  18   1701  18   1439   TROPONIN I ng/mL  <0.006  <0.006  <0.006  <0.006  <0.006     Results from last 7 days   Lab Units  18   0109  18   1940  18   1439   WBC 10*3/mm3  7.04  7.60  7.19   HEMOGLOBIN g/dL  10.9*  11.4*  13.2   PLATELETS 10*3/mm3  268  278  323     Results from last 7 days   Lab Units  18   0109  18   1940  18   1439   SODIUM mmol/L  141  141  142   POTASSIUM mmol/L  3.6  4.0  4.1   CHLORIDE mmol/L  103  103  103   CO2 mmol/L  29.3  30.6  29.2   BUN mg/dL  15  15  14   CREATININE mg/dL  0.93  0.87  0.89   CALCIUM mg/dL  9.0  9.2  9.6   GLUCOSE mg/dL  135*  128*  93   ALT (SGPT) U/L   --    --   24   AST (SGOT) U/L   --    --   19     Lab Results   Component Value Date    INR 1.02 2017    INR 1.03 2017    INR 0.98 2017    INR 0.93 2017     Lab Results   Component Value Date    MG 2.0 2018    MG 1.8 2018    MG 1.9 2018     Lab Results   Component Value Date    TSH 3.310 2018    TRIG 119 2018    HDL 33 (L) 2018    LDL 72 2018      Lab Results   Component Value Date    BNP 38.0 2018    BNP 34.0 2018    .0 (H) 2018       [id number]      Imaging Results (last 72 hours)     Procedure Component Value Units Date/Time    XR Chest 1 View [[id number]] Collected:  18 1531     Updated:  18 1533    Narrative:       EXAMINATION: XR CHEST 1 VW-      CLINICAL INDICATION:     Chest Pain triage protocol     TECHNIQUE:  XR CHEST 1 VW-      COMPARISON: 2018      FINDINGS:        Lungs are aerated.   Cardiomegaly is noted.   No pneumothorax.   No effusions.   No acute osseous findings.            Impression:       Stable chest. No acute cardiopulmonary findings.     This report was finalized on 2018 3:31 PM by Dr. Kunal Castanon MD.              Thank you very much for asking us to be involved in this  patient's care.  We will follow along with you.    ERASTO Bloom acting as scribe for Dr. Jatinder Matias MD, FACC  12/13/18  9:40 AM    I, Jatinder Matias MD, [facility], personally performed the services described in this documentation as scribed by the above named individual in my presence, made necessary changes in the note is both accurate and complete.     Jatinder Matias MD, FACC   12/13/18  9:40 AM

## 2018-12-13 NOTE — PLAN OF CARE
Problem: Patient Care Overview  Goal: Plan of Care Review  Outcome: Ongoing (interventions implemented as appropriate)    Goal: Individualization and Mutuality  Outcome: Ongoing (interventions implemented as appropriate)    Goal: Discharge Needs Assessment  Outcome: Ongoing (interventions implemented as appropriate)    Goal: Interprofessional Rounds/Family Conf  Outcome: Ongoing (interventions implemented as appropriate)      Problem: Hyperglycemia, Persistent (Adult)  Goal: Signs and Symptoms of Listed Potential Problems Will be Absent, Minimized or Managed (Hyperglycemia, Persistent)  Outcome: Ongoing (interventions implemented as appropriate)      Problem: Cardiac: ACS (Acute Coronary Syndrome) (Adult)  Goal: Signs and Symptoms of Listed Potential Problems Will be Absent, Minimized or Managed (Cardiac: ACS)  Outcome: Ongoing (interventions implemented as appropriate)

## 2018-12-14 VITALS
SYSTOLIC BLOOD PRESSURE: 113 MMHG | WEIGHT: 220.46 LBS | HEIGHT: 67 IN | RESPIRATION RATE: 18 BRPM | DIASTOLIC BLOOD PRESSURE: 59 MMHG | HEART RATE: 55 BPM | BODY MASS INDEX: 34.6 KG/M2 | OXYGEN SATURATION: 98 % | TEMPERATURE: 97.5 F

## 2018-12-14 LAB
ANION GAP SERPL CALCULATED.3IONS-SCNC: 7.8 MMOL/L (ref 3.6–11.2)
BASOPHILS # BLD AUTO: 0.04 10*3/MM3 (ref 0–0.3)
BASOPHILS NFR BLD AUTO: 0.5 % (ref 0–2)
BUN BLD-MCNC: 16 MG/DL (ref 7–21)
BUN/CREAT SERPL: 17.2 (ref 7–25)
CALCIUM SPEC-SCNC: 9.1 MG/DL (ref 7.7–10)
CHLORIDE SERPL-SCNC: 100 MMOL/L (ref 99–112)
CO2 SERPL-SCNC: 30.2 MMOL/L (ref 24.3–31.9)
CREAT BLD-MCNC: 0.93 MG/DL (ref 0.43–1.29)
DEPRECATED RDW RBC AUTO: 40.7 FL (ref 37–54)
EOSINOPHIL # BLD AUTO: 0.34 10*3/MM3 (ref 0–0.7)
EOSINOPHIL NFR BLD AUTO: 3.9 % (ref 0–7)
ERYTHROCYTE [DISTWIDTH] IN BLOOD BY AUTOMATED COUNT: 13 % (ref 11.5–14.5)
GFR SERPL CREATININE-BSD FRML MDRD: 59 ML/MIN/1.73
GLUCOSE BLD-MCNC: 189 MG/DL (ref 70–110)
GLUCOSE BLDC GLUCOMTR-MCNC: 114 MG/DL (ref 70–130)
HCT VFR BLD AUTO: 35.5 % (ref 37–47)
HGB BLD-MCNC: 11.2 G/DL (ref 12–16)
IMM GRANULOCYTES # BLD: 0.11 10*3/MM3 (ref 0–0.03)
IMM GRANULOCYTES NFR BLD: 1.3 % (ref 0–0.5)
LYMPHOCYTES # BLD AUTO: 2.3 10*3/MM3 (ref 1–3)
LYMPHOCYTES NFR BLD AUTO: 26.5 % (ref 16–46)
MAGNESIUM SERPL-MCNC: 1.8 MG/DL (ref 1.7–2.6)
MCH RBC QN AUTO: 27.5 PG (ref 27–33)
MCHC RBC AUTO-ENTMCNC: 31.5 G/DL (ref 33–37)
MCV RBC AUTO: 87 FL (ref 80–94)
MONOCYTES # BLD AUTO: 0.64 10*3/MM3 (ref 0.1–0.9)
MONOCYTES NFR BLD AUTO: 7.4 % (ref 0–12)
NEUTROPHILS # BLD AUTO: 5.25 10*3/MM3 (ref 1.4–6.5)
NEUTROPHILS NFR BLD AUTO: 60.4 % (ref 40–75)
OSMOLALITY SERPL CALC.SUM OF ELEC: 281.9 MOSM/KG (ref 273–305)
PLATELET # BLD AUTO: 266 10*3/MM3 (ref 130–400)
PMV BLD AUTO: 11 FL (ref 6–10)
POTASSIUM BLD-SCNC: 3.7 MMOL/L (ref 3.5–5.3)
POTASSIUM BLD-SCNC: 3.7 MMOL/L (ref 3.5–5.3)
RBC # BLD AUTO: 4.08 10*6/MM3 (ref 4.2–5.4)
SODIUM BLD-SCNC: 138 MMOL/L (ref 135–153)
WBC NRBC COR # BLD: 8.68 10*3/MM3 (ref 4.5–12.5)

## 2018-12-14 PROCEDURE — 94799 UNLISTED PULMONARY SVC/PX: CPT

## 2018-12-14 PROCEDURE — 96366 THER/PROPH/DIAG IV INF ADDON: CPT

## 2018-12-14 PROCEDURE — 84132 ASSAY OF SERUM POTASSIUM: CPT | Performed by: INTERNAL MEDICINE

## 2018-12-14 PROCEDURE — G0378 HOSPITAL OBSERVATION PER HR: HCPCS

## 2018-12-14 PROCEDURE — 85025 COMPLETE CBC W/AUTO DIFF WBC: CPT | Performed by: NURSE PRACTITIONER

## 2018-12-14 PROCEDURE — 96365 THER/PROPH/DIAG IV INF INIT: CPT

## 2018-12-14 PROCEDURE — 80048 BASIC METABOLIC PNL TOTAL CA: CPT | Performed by: NURSE PRACTITIONER

## 2018-12-14 PROCEDURE — 83735 ASSAY OF MAGNESIUM: CPT | Performed by: INTERNAL MEDICINE

## 2018-12-14 PROCEDURE — 82962 GLUCOSE BLOOD TEST: CPT

## 2018-12-14 RX ORDER — MAGNESIUM SULFATE HEPTAHYDRATE 40 MG/ML
4 INJECTION, SOLUTION INTRAVENOUS ONCE
Status: COMPLETED | OUTPATIENT
Start: 2018-12-14 | End: 2018-12-14

## 2018-12-14 RX ORDER — ATENOLOL 25 MG/1
12.5 TABLET ORAL
Qty: 30 TABLET | Refills: 0 | Status: ON HOLD | OUTPATIENT
Start: 2018-12-14 | End: 2019-01-26

## 2018-12-14 RX ADMIN — IPRATROPIUM BROMIDE AND ALBUTEROL SULFATE 3 ML: .5; 3 SOLUTION RESPIRATORY (INHALATION) at 07:23

## 2018-12-14 RX ADMIN — PANTOPRAZOLE SODIUM 40 MG: 40 TABLET, DELAYED RELEASE ORAL at 06:57

## 2018-12-14 RX ADMIN — BUDESONIDE AND FORMOTEROL FUMARATE DIHYDRATE 2 PUFF: 80; 4.5 AEROSOL RESPIRATORY (INHALATION) at 07:23

## 2018-12-14 RX ADMIN — MAGNESIUM SULFATE HEPTAHYDRATE 4 G: 40 INJECTION, SOLUTION INTRAVENOUS at 05:09

## 2018-12-14 NOTE — DISCHARGE SUMMARY
DISCHARGE SUMMARY        Patient Identification:  Name:  Mayra Hays  Age:  73 y.o.  Sex:  female  :  1945  MRN:  [id number]  Visit Number:  [id number]    Date of Admission: 2018  Date of Discharge:  2018    PCP: Elena Garcia APRN    Discharging Provider: Rosalia Pinto PA-C      Discharge Diagnoses     Chest pain, resolved      Consults/Procedures     Consults:   Consults     Date and Time Order Name Status Description    2018 Inpatient Cardiology Consult Completed     2018 180 IP General Consult (Use specialty-specific consult if known)          History of Presenting Illness     Patient is a 73 y.o. female presented to [facility] complaining of chest pain.  Please see the admitting history and physical for further details.      Hospital Course     Patient was admitted to the observation unit for bradycardia and chest pain. The patient was evaluated by Dr. Matias who recommended to decrease atenolol to 12.5 mg daily for bradycardia. Her heart rate has overall improved. Stress test and echo performed that were normal. Cardiology cleared for discharge. Stable this morning with resolved chest pain.     Discharge Vitals/Physical Examination     Vital Signs:  Temp:  [97.5 °F (36.4 °C)-98.4 °F (36.9 °C)] 97.5 °F (36.4 °C)  Heart Rate:  [50-79] 50  Resp:  [18-20] 18  BP: (113-132)/(48-65) 113/59  Mean Arterial Pressure (Non-Invasive) for the past 24 hrs (Last 3 readings):   Noninvasive MAP (mmHg)   18 0716 81   18 97   18 1412 83     SpO2 Percentage    18 2047 18 0508 18 0716   SpO2: 94% 97% 97%     SpO2:  [93 %-97 %] 97 %  on  Flow (L/min):  [2] 2;   Device (Oxygen Therapy): room air    Body mass index is 34.52 kg/m².  Wt Readings from Last 3 Encounters:   18 100 kg (220 lb 7.4 oz)   18 101 kg (221 lb 9.6 oz)   18 99.8 kg (220 lb)         Physical Exam:    Constitutional:  Well-developed  and well-nourished.  No respiratory distress.      HENT:  Head: Normocephalic and atraumatic.  Mouth:  Moist mucous membranes.    Eyes:  Conjunctivae and EOM are normal.  No scleral icterus.  Neck:  Neck supple.  No JVD present.    Cardiovascular:  Normal rate, regular rhythm and normal heart sounds with no murmur. No edema.  Pulmonary/Chest:  No respiratory distress, no wheezes, no crackles, with normal breath sounds and good air movement.  Abdominal:  Soft.  Bowel sounds are normal.  No distension and no tenderness.   Musculoskeletal:  No edema, no tenderness, and no deformity.  No swelling or redness of joints.  Neurological:  Alert and oriented to person, place, and time.  No facial droop.  No slurred speech.   Skin:  Skin is warm and dry.  No rash noted.  No pallor.   Psychiatric:  Normal mood and affect.  Behavior is normal.      Pertinent Laboratory/Radiology Results     Pertinent Laboratory Results:    Results from last 7 days   Lab Units  12/13/18   0740  12/13/18 0109 12/12/18 1940   TROPONIN I ng/mL  <0.006  <0.006  <0.006       Results from last 7 days   Lab Units  12/13/18   0109   CHOLESTEROL mg/dL  129   TRIGLYCERIDES mg/dL  119   HDL CHOL mg/dL  33*   LDL CHOL mg/dL  72     Results from last 7 days   Lab Units  12/12/18   1542   PH, ARTERIAL pH units  7.399   PO2 ART mm Hg  65.2*   PCO2, ARTERIAL mm Hg  47.8*   HCO3 ART mmol/L  28.9*     Results from last 7 days   Lab Units  12/14/18   0050  12/13/18 0109 12/12/18 1940   WBC 10*3/mm3  8.68  7.04  7.60   HEMOGLOBIN g/dL  11.2*  10.9*  11.4*   HEMATOCRIT %  35.5*  35.8*  36.7*   MCV fL  87.0  87.5  86.4   MCHC g/dL  31.5*  30.4*  31.1*   PLATELETS 10*3/mm3  266  268  278     Results from last 7 days   Lab Units  12/14/18   0050  12/13/18   0109  12/12/18 1940 12/12/18   1439   SODIUM mmol/L  138  141  141  142   POTASSIUM mmol/L  3.7  3.7  3.6  4.0  4.1   MAGNESIUM mg/dL  1.8  2.0  1.8   --    CHLORIDE mmol/L  100  103  103  103   CO2  mmol/L  30.2  29.3  30.6  29.2   BUN mg/dL  16  15  15  14   CREATININE mg/dL  0.93  0.93  0.87  0.89   EGFR IF NONAFRICN AM mL/min/1.73  59*  59*  64  62   CALCIUM mg/dL  9.1  9.0  9.2  9.6   GLUCOSE mg/dL  189*  135*  128*  93   ALBUMIN g/dL   --    --    --   4.70   BILIRUBIN mg/dL   --    --    --   0.3   ALK PHOS U/L   --    --    --   139*   AST (SGOT) U/L   --    --    --   19   ALT (SGPT) U/L   --    --    --   24   Estimated Creatinine Clearance: 66.5 mL/min (by C-G formula based on SCr of 0.93 mg/dL).  No results found for: AMMONIA    Hemoglobin A1C   Date/Time Value Ref Range Status   12/13/2018 0109 6.80 (H) 4.50 - 5.70 % Final     Glucose   Date/Time Value Ref Range Status   12/14/2018 0553 114 70 - 130 mg/dL Final   12/13/2018 2029 131 (H) 70 - 130 mg/dL Final   12/13/2018 1651 199 (H) 70 - 130 mg/dL Final   12/13/2018 1411 163 (H) 70 - 130 mg/dL Final   12/13/2018 0703 90 70 - 130 mg/dL Final   12/12/2018 2202 170 (H) 70 - 130 mg/dL Final   12/12/2018 2004 141 (H) 70 - 130 mg/dL Final     Lab Results   Component Value Date    HGBA1C 6.80 (H) 12/13/2018     Lab Results   Component Value Date    TSH 3.310 12/13/2018    FREET4 1.88 (H) 05/31/2017       Blood Culture   Date Value Ref Range Status   12/12/2018 No growth at 24 hours  Preliminary   12/12/2018 No growth at 24 hours  Preliminary                 Pain Management Panel     Pain Management Panel Latest Ref Rng & Units 5/30/2017    AMPHETAMINES SCREEN, URINE Negative Negative    BARBITURATES SCREEN Negative Negative    BENZODIAZEPINE SCREEN, URINE Negative Negative    BUPRENORPHINE Negative Negative    COCAINE SCREEN, URINE Negative Negative    METHADONE SCREEN, URINE Negative Negative          Pertinent Radiology Results:  Imaging Results (all)     Procedure Component Value Units Date/Time    XR Chest 1 View [[id number]] Collected:  12/12/18 1531     Updated:  12/12/18 1533    Narrative:       EXAMINATION: XR CHEST 1 VW-      CLINICAL INDICATION:      Chest Pain triage protocol     TECHNIQUE:  XR CHEST 1 VW-      COMPARISON: 11/7/2018      FINDINGS:        Lungs are aerated.   Cardiomegaly is noted.   No pneumothorax.   No effusions.   No acute osseous findings.            Impression:       Stable chest. No acute cardiopulmonary findings.     This report was finalized on 12/12/2018 3:31 PM by Dr. Kunal Castanon MD.             Test Results Pending at Discharge:   Order Current Status    Blood Culture - Blood, Arm, Right Preliminary result    Blood Culture - Blood, Arm, Right Preliminary result          Discharge Disposition/Discharge Medications/Discharge Appointments     Discharge Disposition:   Home or Self Care    Condition at Discharge:  Stable, much improved with no issues today.     Code Status While Inpatient:  Code Status and Medical Interventions:   Ordered at: 12/12/18 1934     Level Of Support Discussed With:    Patient     Code Status:    CPR     Medical Interventions (Level of Support Prior to Arrest):    Full       Discharge Medications:     Discharge Medications      Changes to Medications      Instructions Start Date   atenolol 25 MG tablet  Commonly known as:  TENORMIN  What changed:    · how much to take  · when to take this   12.5 mg, Oral, Every 24 Hours Scheduled         Continue These Medications      Instructions Start Date   albuterol 108 (90 Base) MCG/ACT inhaler  Commonly known as:  PROVENTIL HFA;VENTOLIN HFA;PROAIR HFA   2 puffs, Inhalation, 2 Times Daily      apixaban 5 MG tablet tablet  Commonly known as:  ELIQUIS   5 mg, Oral, Every 12 Hours Scheduled      atorvastatin 20 MG tablet  Commonly known as:  LIPITOR   20 mg, Oral, Daily      esomeprazole 20 MG capsule  Commonly known as:  nexIUM   20 mg, Oral, Every Morning Before Breakfast      ferrous sulfate 325 (65 FE) MG tablet   325 mg, Oral, 2 Times Daily      Fluticasone Furoate-Vilanterol 100-25 MCG/INH aerosol powder   Commonly known as:  BREO ELLIPTA   1 puff, Inhalation,  Daily      furosemide 20 MG tablet  Commonly known as:  LASIX   20 mg, Oral, Daily      ipratropium-albuterol 0.5-2.5 mg/3 ml nebulizer  Commonly known as:  DUO-NEB   3 mL, Nebulization, Every 4 Hours PRN      lisinopril 5 MG tablet  Commonly known as:  PRINIVIL,ZESTRIL   5 mg, Oral, Daily      metFORMIN 500 MG tablet  Commonly known as:  GLUCOPHAGE   500 mg, Oral, Daily      pantoprazole 40 MG EC tablet  Commonly known as:  PROTONIX   40 mg, Oral, Daily      potassium chloride 20 MEQ CR tablet  Commonly known as:  K-DUR,KLOR-CON   20 mEq, Oral, Daily             Discharge Diet:  Diet Instructions     Diet: Regular, Consistent Carbohydrate, Cardiac      Discharge Diet:   Regular  Consistent Carbohydrate  Cardiac             Discharge Activity:  Activity Instructions     Activity as Tolerated            Discharge Appointments:  Your Scheduled Appointments    Jan 31, 2019 12:20 PM EST  Follow Up with ERASTO Mckeon  [facility] PULMONOLOGY CRITICAL [facility] (--) [address]  [facility] [address]  [phone]   Arrive 15 minutes prior to appointment.   Feb 28, 2019 12:20 PM EST  Follow Up with Darek Nelson PA-C  [facility] (--) [address] [facility] [id number] [phone]   Arrive 15 minutes prior to appointment.          Follow-up Information     Elena Garcia APRN. Schedule an appointment as soon as possible for a visit in 1 week(s).    Specialty:  Family Medicine  Contact information:  [address]  [facility] [address]  [phone]                           Rosalia Pinto PA-C  12/14/18  7:47 AM          Please note that this discharge summary required more than 30 minutes to complete.

## 2018-12-14 NOTE — PLAN OF CARE
Problem: Fall Risk (Adult)  Goal: Identify Related Risk Factors and Signs and Symptoms  Outcome: Ongoing (interventions implemented as appropriate)   12/13/18 0735   Fall Risk (Adult)   Related Risk Factors (Fall Risk) environment unfamiliar;age-related changes   Signs and Symptoms (Fall Risk) presence of risk factors     Goal: Absence of Fall  Outcome: Ongoing (interventions implemented as appropriate)      Problem: Patient Care Overview  Goal: Plan of Care Review  Outcome: Ongoing (interventions implemented as appropriate)    Goal: Individualization and Mutuality  Outcome: Ongoing (interventions implemented as appropriate)      Problem: Cardiac: ACS (Acute Coronary Syndrome) (Adult)  Goal: Signs and Symptoms of Listed Potential Problems Will be Absent, Minimized or Managed (Cardiac: ACS)  Outcome: Ongoing (interventions implemented as appropriate)      Problem: Patient Care Overview  Goal: Plan of Care Review  Outcome: Ongoing (interventions implemented as appropriate)    Goal: Individualization and Mutuality  Outcome: Ongoing (interventions implemented as appropriate)

## 2018-12-14 NOTE — PROGRESS NOTES
Continued Stay Note  ADIS Giordano     Patient Name: Mayra Hays  MRN: [id number]  Today's Date: 12/14/2018    Admit Date: 12/12/2018    Discharge Plan     Row Name 12/14/18 1451       Plan    Final Discharge Disposition Code  01 - home or self-care    Final Note  Patient discharged home on 12-14-18. No needs identified.         Discharge Codes    No documentation.       Expected Discharge Date and Time     Expected Discharge Date Expected Discharge Time    Dec 14, 2018             Liz Nicolas RN

## 2018-12-15 ENCOUNTER — READMISSION MANAGEMENT (OUTPATIENT)
Dept: CALL CENTER | Facility: HOSPITAL | Age: 73
End: 2018-12-15

## 2018-12-15 NOTE — OUTREACH NOTE
Prep Survey      Responses   Facility patient discharged from?  [facility]   Is patient eligible?  Yes   Discharge diagnosis  Chest pain, resolved   Does the patient have one of the following disease processes/diagnoses(primary or secondary)?  Other   Does the patient have Home health ordered?  No   Is there a DME ordered?  No   Prep survey completed?  Yes          Bailee Funes RN

## 2018-12-17 LAB
BACTERIA SPEC AEROBE CULT: NORMAL
BACTERIA SPEC AEROBE CULT: NORMAL

## 2018-12-18 ENCOUNTER — READMISSION MANAGEMENT (OUTPATIENT)
Dept: CALL CENTER | Facility: HOSPITAL | Age: 73
End: 2018-12-18

## 2018-12-18 NOTE — OUTREACH NOTE
Medical Week 1 Survey      Responses   Facility patient discharged from?  [facility]   Does the patient have one of the following disease processes/diagnoses(primary or secondary)?  Other   Is there a successful TCM telephone encounter documented?  No   Week 1 attempt successful?  No   Unsuccessful attempts  Attempt 1          Kim Garces RN

## 2018-12-19 ENCOUNTER — READMISSION MANAGEMENT (OUTPATIENT)
Dept: CALL CENTER | Facility: HOSPITAL | Age: 73
End: 2018-12-19

## 2018-12-19 NOTE — OUTREACH NOTE
Medical Week 1 Survey      Responses   Facility patient discharged from?  [facility]   Does the patient have one of the following disease processes/diagnoses(primary or secondary)?  Other   Is there a successful TCM telephone encounter documented?  No   Week 1 attempt successful?  Yes   Call start time  1506   Call end time  1510   Discharge diagnosis  Chest pain, resolved   Is patient permission given to speak with other caregiver?  Yes   List who call center can speak with  Hailey, daughter   Person spoke with today (if not patient) and relationship  Hailey, daughter   Meds reviewed with patient/caregiver?  Yes   Is the patient having any side effects they believe may be caused by any medication additions or changes?  No   Does the patient have all medications ordered at discharge?  Yes   Is the patient taking all medications as directed (includes completed medication regime)?  Yes   Does the patient have a primary care provider?   Yes   Does the patient have an appointment with their PCP within 7 days of discharge?  Yes   Comments regarding PCP  ERASTO Britt . Family states that she is seeing tomorrow.    Has the patient kept scheduled appointments due by today?  N/A   Has home health visited the patient within 72 hours of discharge?  N/A   Psychosocial issues?  No   Did the patient receive a copy of their discharge instructions?  Yes   Nursing interventions  Reviewed instructions with patient   What is the patient's perception of their health status since discharge?  Improving   Is the patient/caregiver able to teach back signs and symptoms related to disease process for when to call PCP?  Yes   Is the patient/caregiver able to teach back signs and symptoms related to disease process for when to call 911?  Yes   Is the patient/caregiver able to teach back the hierarchy of who to call/visit for symptoms/problems? PCP, Specialist, Home health nurse, Urgent Care, ED, 911  Yes   Week 1 call completed?  Yes           Hailey Carlisle, RN

## 2018-12-27 ENCOUNTER — READMISSION MANAGEMENT (OUTPATIENT)
Dept: CALL CENTER | Facility: HOSPITAL | Age: 73
End: 2018-12-27

## 2018-12-27 NOTE — OUTREACH NOTE
Medical Week 2 Survey      Responses   Facility patient discharged from?  [facility]   Does the patient have one of the following disease processes/diagnoses(primary or secondary)?  Other   Week 2 attempt successful?  Yes   Call start time  1126   Discharge diagnosis  Chest pain, resolved   Call end time  1128   Person spoke with today (if not patient) and relationship  Hailey, daughter   Meds reviewed with patient/caregiver?  Yes   Is the patient taking all medications as directed (includes completed medication regime)?  Yes   Has the patient kept scheduled appointments due by today?  N/A   Comments  f/u appt 1/11   Psychosocial issues?  No   What is the patient's perception of their health status since discharge?  Returned to baseline/stable   Is the patient/caregiver able to teach back signs and symptoms related to disease process for when to call PCP?  Yes   Is the patient/caregiver able to teach back signs and symptoms related to disease process for when to call 911?  Yes   Is the patient/caregiver able to teach back the hierarchy of who to call/visit for symptoms/problems? PCP, Specialist, Home health nurse, Urgent Care, ED, 911  Yes   Week 2 Call Completed?  Yes   Revoked  No further contact(revokes)-requires comment   Graduated/Revoked comments  baseline          Carissa Chun, RN

## 2019-01-04 ENCOUNTER — APPOINTMENT (OUTPATIENT)
Dept: CT IMAGING | Facility: HOSPITAL | Age: 74
End: 2019-01-04

## 2019-01-04 ENCOUNTER — APPOINTMENT (OUTPATIENT)
Dept: GENERAL RADIOLOGY | Facility: HOSPITAL | Age: 74
End: 2019-01-04

## 2019-01-04 ENCOUNTER — HOSPITAL ENCOUNTER (EMERGENCY)
Facility: HOSPITAL | Age: 74
Discharge: HOME | End: 2019-01-04
Attending: FAMILY MEDICINE | Admitting: FAMILY MEDICINE

## 2019-01-04 VITALS
BODY MASS INDEX: 36.37 KG/M2 | TEMPERATURE: 97.8 F | SYSTOLIC BLOOD PRESSURE: 102 MMHG | HEIGHT: 68 IN | WEIGHT: 240 LBS | OXYGEN SATURATION: 96 % | DIASTOLIC BLOOD PRESSURE: 54 MMHG | HEART RATE: 65 BPM | RESPIRATION RATE: 18 BRPM

## 2019-01-04 DIAGNOSIS — J44.1 COPD EXACERBATION (HCC): Primary | ICD-10-CM

## 2019-01-04 DIAGNOSIS — R07.89 ACUTE CHEST WALL PAIN: ICD-10-CM

## 2019-01-04 LAB
ALBUMIN SERPL-MCNC: 4.5 G/DL (ref 3.4–4.8)
ALBUMIN/GLOB SERPL: 1.4 G/DL (ref 1.5–2.5)
ALP SERPL-CCNC: 133 U/L (ref 35–104)
ALT SERPL W P-5'-P-CCNC: 18 U/L (ref 10–36)
ANION GAP SERPL CALCULATED.3IONS-SCNC: 4.1 MMOL/L (ref 3.6–11.2)
AST SERPL-CCNC: 20 U/L (ref 10–30)
BACTERIA UR QL AUTO: ABNORMAL /HPF
BASOPHILS # BLD AUTO: 0.05 10*3/MM3 (ref 0–0.3)
BASOPHILS NFR BLD AUTO: 0.7 % (ref 0–2)
BILIRUB SERPL-MCNC: 0.5 MG/DL (ref 0.2–1.8)
BILIRUB UR QL STRIP: NEGATIVE
BUN BLD-MCNC: 27 MG/DL (ref 7–21)
BUN/CREAT SERPL: 21.4 (ref 7–25)
CALCIUM SPEC-SCNC: 9.8 MG/DL (ref 7.7–10)
CHLORIDE SERPL-SCNC: 103 MMOL/L (ref 99–112)
CLARITY UR: CLEAR
CO2 SERPL-SCNC: 31.9 MMOL/L (ref 24.3–31.9)
COLOR UR: YELLOW
CREAT BLD-MCNC: 1.26 MG/DL (ref 0.43–1.29)
CRP SERPL-MCNC: <0.5 MG/DL (ref 0–0.99)
D-LACTATE SERPL-SCNC: 1.3 MMOL/L (ref 0.5–2)
DEPRECATED RDW RBC AUTO: 41.4 FL (ref 37–54)
EOSINOPHIL # BLD AUTO: 0.29 10*3/MM3 (ref 0–0.7)
EOSINOPHIL NFR BLD AUTO: 3.9 % (ref 0–7)
ERYTHROCYTE [DISTWIDTH] IN BLOOD BY AUTOMATED COUNT: 13.4 % (ref 11.5–14.5)
FLUAV AG NPH QL: NEGATIVE
FLUBV AG NPH QL IA: NEGATIVE
GFR SERPL CREATININE-BSD FRML MDRD: 42 ML/MIN/1.73
GLOBULIN UR ELPH-MCNC: 3.2 GM/DL
GLUCOSE BLD-MCNC: 148 MG/DL (ref 70–110)
GLUCOSE UR STRIP-MCNC: NEGATIVE MG/DL
HCT VFR BLD AUTO: 39.6 % (ref 37–47)
HGB BLD-MCNC: 12.7 G/DL (ref 12–16)
HGB UR QL STRIP.AUTO: NEGATIVE
HOLD SPECIMEN: NORMAL
HOLD SPECIMEN: NORMAL
HYALINE CASTS UR QL AUTO: ABNORMAL /LPF
IMM GRANULOCYTES # BLD AUTO: 0.03 10*3/MM3 (ref 0–0.03)
IMM GRANULOCYTES NFR BLD AUTO: 0.4 % (ref 0–0.5)
KETONES UR QL STRIP: NEGATIVE
LEUKOCYTE ESTERASE UR QL STRIP.AUTO: ABNORMAL
LIPASE SERPL-CCNC: 34 U/L (ref 13–60)
LYMPHOCYTES # BLD AUTO: 1.84 10*3/MM3 (ref 1–3)
LYMPHOCYTES NFR BLD AUTO: 24.7 % (ref 16–46)
MAGNESIUM SERPL-MCNC: 2.3 MG/DL (ref 1.7–2.6)
MCH RBC QN AUTO: 27.5 PG (ref 27–33)
MCHC RBC AUTO-ENTMCNC: 32.1 G/DL (ref 33–37)
MCV RBC AUTO: 85.7 FL (ref 80–94)
MONOCYTES # BLD AUTO: 0.74 10*3/MM3 (ref 0.1–0.9)
MONOCYTES NFR BLD AUTO: 9.9 % (ref 0–12)
NEUTROPHILS # BLD AUTO: 4.51 10*3/MM3 (ref 1.4–6.5)
NEUTROPHILS NFR BLD AUTO: 60.4 % (ref 40–75)
NITRITE UR QL STRIP: NEGATIVE
OSMOLALITY SERPL CALC.SUM OF ELEC: 285.4 MOSM/KG (ref 273–305)
PH UR STRIP.AUTO: <=5 [PH] (ref 5–8)
PLATELET # BLD AUTO: 217 10*3/MM3 (ref 130–400)
PMV BLD AUTO: 11.5 FL (ref 6–10)
POTASSIUM BLD-SCNC: 4.2 MMOL/L (ref 3.5–5.3)
PROT SERPL-MCNC: 7.7 G/DL (ref 6–8)
PROT UR QL STRIP: NEGATIVE
RBC # BLD AUTO: 4.62 10*6/MM3 (ref 4.2–5.4)
RBC # UR: ABNORMAL /HPF
REF LAB TEST METHOD: ABNORMAL
SODIUM BLD-SCNC: 139 MMOL/L (ref 135–153)
SP GR UR STRIP: 1.02 (ref 1–1.03)
SQUAMOUS #/AREA URNS HPF: ABNORMAL /HPF
TROPONIN I SERPL-MCNC: <0.006 NG/ML
TROPONIN I SERPL-MCNC: <0.006 NG/ML
UROBILINOGEN UR QL STRIP: ABNORMAL
WBC NRBC COR # BLD: 7.46 10*3/MM3 (ref 4.5–12.5)
WBC UR QL AUTO: ABNORMAL /HPF
WHOLE BLOOD HOLD SPECIMEN: NORMAL
WHOLE BLOOD HOLD SPECIMEN: NORMAL
YEAST URNS QL MICRO: ABNORMAL /HPF

## 2019-01-04 PROCEDURE — 36415 COLL VENOUS BLD VENIPUNCTURE: CPT | Performed by: FAMILY MEDICINE

## 2019-01-04 PROCEDURE — 96365 THER/PROPH/DIAG IV INF INIT: CPT

## 2019-01-04 PROCEDURE — 81001 URINALYSIS AUTO W/SCOPE: CPT | Performed by: FAMILY MEDICINE

## 2019-01-04 PROCEDURE — 71046 X-RAY EXAM CHEST 2 VIEWS: CPT | Performed by: RADIOLOGY

## 2019-01-04 PROCEDURE — 71250 CT THORAX DX C-: CPT

## 2019-01-04 PROCEDURE — 85025 COMPLETE CBC W/AUTO DIFF WBC: CPT | Performed by: EMERGENCY MEDICINE

## 2019-01-04 PROCEDURE — 25010000002 METHYLPREDNISOLONE PER 125 MG: Performed by: FAMILY MEDICINE

## 2019-01-04 PROCEDURE — 71250 CT THORAX DX C-: CPT | Performed by: RADIOLOGY

## 2019-01-04 PROCEDURE — 99284 EMERGENCY DEPT VISIT MOD MDM: CPT

## 2019-01-04 PROCEDURE — 80053 COMPREHEN METABOLIC PANEL: CPT | Performed by: EMERGENCY MEDICINE

## 2019-01-04 PROCEDURE — 87040 BLOOD CULTURE FOR BACTERIA: CPT | Performed by: FAMILY MEDICINE

## 2019-01-04 PROCEDURE — 96367 TX/PROPH/DG ADDL SEQ IV INF: CPT

## 2019-01-04 PROCEDURE — 87804 INFLUENZA ASSAY W/OPTIC: CPT | Performed by: FAMILY MEDICINE

## 2019-01-04 PROCEDURE — 83735 ASSAY OF MAGNESIUM: CPT | Performed by: FAMILY MEDICINE

## 2019-01-04 PROCEDURE — 93010 ELECTROCARDIOGRAM REPORT: CPT | Performed by: INTERNAL MEDICINE

## 2019-01-04 PROCEDURE — 25010000002 CEFTRIAXONE: Performed by: FAMILY MEDICINE

## 2019-01-04 PROCEDURE — 93005 ELECTROCARDIOGRAM TRACING: CPT | Performed by: EMERGENCY MEDICINE

## 2019-01-04 PROCEDURE — 93005 ELECTROCARDIOGRAM TRACING: CPT | Performed by: FAMILY MEDICINE

## 2019-01-04 PROCEDURE — 96375 TX/PRO/DX INJ NEW DRUG ADDON: CPT

## 2019-01-04 PROCEDURE — 83690 ASSAY OF LIPASE: CPT | Performed by: FAMILY MEDICINE

## 2019-01-04 PROCEDURE — 83605 ASSAY OF LACTIC ACID: CPT | Performed by: FAMILY MEDICINE

## 2019-01-04 PROCEDURE — 87086 URINE CULTURE/COLONY COUNT: CPT | Performed by: FAMILY MEDICINE

## 2019-01-04 PROCEDURE — 84484 ASSAY OF TROPONIN QUANT: CPT | Performed by: FAMILY MEDICINE

## 2019-01-04 PROCEDURE — 94799 UNLISTED PULMONARY SVC/PX: CPT

## 2019-01-04 PROCEDURE — 71046 X-RAY EXAM CHEST 2 VIEWS: CPT

## 2019-01-04 PROCEDURE — 25010000002 AZITHROMYCIN: Performed by: FAMILY MEDICINE

## 2019-01-04 PROCEDURE — 86140 C-REACTIVE PROTEIN: CPT | Performed by: FAMILY MEDICINE

## 2019-01-04 PROCEDURE — 94640 AIRWAY INHALATION TREATMENT: CPT

## 2019-01-04 PROCEDURE — 84484 ASSAY OF TROPONIN QUANT: CPT | Performed by: EMERGENCY MEDICINE

## 2019-01-04 RX ORDER — PREDNISONE 20 MG/1
20 TABLET ORAL 2 TIMES DAILY
Qty: 8 TABLET | Refills: 0 | Status: SHIPPED | OUTPATIENT
Start: 2019-01-05 | End: 2019-01-09

## 2019-01-04 RX ORDER — IPRATROPIUM BROMIDE AND ALBUTEROL SULFATE 2.5; .5 MG/3ML; MG/3ML
3 SOLUTION RESPIRATORY (INHALATION) EVERY 4 HOURS PRN
Qty: 360 ML | Refills: 0 | Status: ON HOLD | OUTPATIENT
Start: 2019-01-04 | End: 2019-01-25

## 2019-01-04 RX ORDER — IPRATROPIUM BROMIDE AND ALBUTEROL SULFATE 2.5; .5 MG/3ML; MG/3ML
3 SOLUTION RESPIRATORY (INHALATION)
Status: COMPLETED | OUTPATIENT
Start: 2019-01-04 | End: 2019-01-04

## 2019-01-04 RX ORDER — FAMOTIDINE 10 MG/ML
20 INJECTION, SOLUTION INTRAVENOUS ONCE
Status: COMPLETED | OUTPATIENT
Start: 2019-01-04 | End: 2019-01-04

## 2019-01-04 RX ORDER — LEVOFLOXACIN 750 MG/1
750 TABLET ORAL DAILY
Qty: 7 TABLET | Refills: 0 | Status: SHIPPED | OUTPATIENT
Start: 2019-01-04 | End: 2019-01-11

## 2019-01-04 RX ORDER — METHYLPREDNISOLONE SODIUM SUCCINATE 125 MG/2ML
125 INJECTION, POWDER, LYOPHILIZED, FOR SOLUTION INTRAMUSCULAR; INTRAVENOUS ONCE
Status: COMPLETED | OUTPATIENT
Start: 2019-01-04 | End: 2019-01-04

## 2019-01-04 RX ORDER — SODIUM CHLORIDE 0.9 % (FLUSH) 0.9 %
10 SYRINGE (ML) INJECTION AS NEEDED
Status: DISCONTINUED | OUTPATIENT
Start: 2019-01-04 | End: 2019-01-04 | Stop reason: HOSPADM

## 2019-01-04 RX ORDER — ASPIRIN 325 MG
325 TABLET ORAL ONCE
Status: DISCONTINUED | OUTPATIENT
Start: 2019-01-04 | End: 2019-01-04 | Stop reason: HOSPADM

## 2019-01-04 RX ADMIN — IPRATROPIUM BROMIDE AND ALBUTEROL SULFATE 3 ML: .5; 3 SOLUTION RESPIRATORY (INHALATION) at 19:43

## 2019-01-04 RX ADMIN — IPRATROPIUM BROMIDE AND ALBUTEROL SULFATE 3 ML: .5; 3 SOLUTION RESPIRATORY (INHALATION) at 18:46

## 2019-01-04 RX ADMIN — FAMOTIDINE 20 MG: 10 INJECTION, SOLUTION INTRAVENOUS at 19:32

## 2019-01-04 RX ADMIN — METHYLPREDNISOLONE SODIUM SUCCINATE 125 MG: 125 INJECTION, POWDER, FOR SOLUTION INTRAMUSCULAR; INTRAVENOUS at 19:31

## 2019-01-04 RX ADMIN — CEFTRIAXONE 1 G: 1 INJECTION, POWDER, FOR SOLUTION INTRAMUSCULAR; INTRAVENOUS at 19:34

## 2019-01-04 RX ADMIN — IPRATROPIUM BROMIDE AND ALBUTEROL SULFATE 3 ML: .5; 3 SOLUTION RESPIRATORY (INHALATION) at 19:04

## 2019-01-04 RX ADMIN — SODIUM CHLORIDE 500 ML: 9 INJECTION, SOLUTION INTRAVENOUS at 19:33

## 2019-01-04 RX ADMIN — AZITHROMYCIN MONOHYDRATE 500 MG: 500 INJECTION, POWDER, LYOPHILIZED, FOR SOLUTION INTRAVENOUS at 20:34

## 2019-01-04 NOTE — ED PROVIDER NOTES
Subjective   History of Present Illness  74 y/o F w/h/o COPD and CAD p/w SOB and wheezing for several days. Pt has L mid-axiliary chest wall pain that radiates to her L flank. No substernal CP. Pt states that the CP is worse with certain movement and deep breathing. Pt denies any fever/chills. No N/V/D. Pt has been using her nebulizer more often since her SOB began. Pt denies dysuria/frequency/urgency. Pt was recently admitted to this facility with similar symptoms approx one month PTP. Pt has multiple exacerbations per yr.   Review of Systems   Constitutional: Negative for chills, fatigue and fever.   Eyes: Negative for photophobia and visual disturbance.   Respiratory: Positive for cough, shortness of breath and wheezing.    Cardiovascular: Negative for chest pain, palpitations and leg swelling.   Gastrointestinal: Negative for abdominal distention, abdominal pain, diarrhea, nausea and vomiting.   Genitourinary: Positive for flank pain. Negative for decreased urine volume, difficulty urinating, dysuria and urgency.   Musculoskeletal: Negative for back pain and neck pain.        +chest wall pain   Skin: Negative for color change and pallor.   Neurological: Negative for tremors, syncope, weakness and headaches.   Psychiatric/Behavioral: Negative for confusion.   All other systems reviewed and are negative.      Past Medical History:   Diagnosis Date   • Collapsed lung    • COPD (chronic obstructive pulmonary disease) (CMS/HCC)    • Diabetes mellitus (CMS/HCC)    • GERD (gastroesophageal reflux disease)    • Hyperlipidemia    • Hypertension    • Myocardial infarction (CMS/HCC)        No Known Allergies    Past Surgical History:   Procedure Laterality Date   • CARDIAC CATHETERIZATION N/A 8/22/2017    Procedure: Left Heart Cath;  Surgeon: Jatinder Matias MD;  Location: [facility] INVASIVE LOCATION;  Service:    • GALLBLADDER SURGERY         History reviewed. No pertinent family history.    Social History      Socioeconomic History   • Marital status: Single     Spouse name: Not on file   • Number of children: Not on file   • Years of education: Not on file   • Highest education level: Not on file   Tobacco Use   • Smoking status: Former Smoker     Packs/day: 3.00     Types: Cigarettes     Last attempt to quit: 2015     Years since quittin.0   • Smokeless tobacco: Never Used   Substance and Sexual Activity   • Alcohol use: No   • Drug use: No           Objective   Physical Exam   Constitutional: She is oriented to person, place, and time. She appears well-developed and well-nourished. She is active.   HENT:   Head: Normocephalic and atraumatic.   Right Ear: Hearing, external ear and ear canal normal.   Left Ear: Hearing, external ear and ear canal normal.   Nose: Nose normal.   Mouth/Throat: Uvula is midline, oropharynx is clear and moist and mucous membranes are normal.   Eyes: Conjunctivae, EOM and lids are normal. Pupils are equal, round, and reactive to light.   Neck: Trachea normal, normal range of motion, full passive range of motion without pain and phonation normal. Neck supple.   Cardiovascular: Normal rate, regular rhythm and normal heart sounds.   Pulmonary/Chest: Effort normal. She has decreased breath sounds. She has wheezes. She has no rhonchi. She has no rales. She exhibits tenderness.       Abdominal: Soft. Normal appearance. She exhibits no distension. There is no tenderness. There is no rigidity, no rebound, no guarding and no CVA tenderness.   Neurological: She is alert and oriented to person, place, and time. GCS eye subscore is 4. GCS verbal subscore is 5. GCS motor subscore is 6.   Skin: Skin is warm, dry and intact. Capillary refill takes less than 2 seconds.   Psychiatric: She has a normal mood and affect. Her speech is normal and behavior is normal. Cognition and memory are normal.   Nursing note and vitals reviewed.      Procedures           ED Course  ED Course as of 2055   Fri  Jan 04, 2019   1835 NSR, 60 bpm, QTc 428ms. No ST segment abnormalities concerning for STEMI. No pathologic blocks or dysrhythmias. No acute change compared to study on 12/13/18.  ECG 12 Lead [BR]      ED Course User Index  [BR] Kemar Leiva MD      HEART score of 3. Pt CP relieved with breathing treatments and steroids. Pt had a stress test 12/12/18 that was read as a low-risk study. Pt treated as COPD exacerbation with continued steroids and abx as outpt. Pt's WBC and CRP are both WNL. CT scan of the chest inconclusive regarding LLL PNA. Pt instructed to continue duo-neb treatments at home q4hrs for next 48 hours. Given recent hospital admission and h/o multiple exacerbations per yr pt is at increased risk for P. Aeruginosa. Pt told to have low threshold for return.            MDM  Number of Diagnoses or Management Options  Acute chest wall pain: new and requires workup  COPD exacerbation (CMS/HCC): new and requires workup     Amount and/or Complexity of Data Reviewed  Clinical lab tests: reviewed and ordered  Tests in the radiology section of CPT®: reviewed and ordered  Tests in the medicine section of CPT®: reviewed and ordered  Decide to obtain previous medical records or to obtain history from someone other than the patient: yes  Review and summarize past medical records: yes  Independent visualization of images, tracings, or specimens: yes    Risk of Complications, Morbidity, and/or Mortality  Presenting problems: high  Diagnostic procedures: high  Management options: high    Patient Progress  Patient progress: stable        Final diagnoses:   COPD exacerbation (CMS/HCC)   Acute chest wall pain            Kemar Leiva MD  01/04/19 2056       Kemar Leiva MD  01/04/19 2057

## 2019-01-04 NOTE — ED NOTES
Pt reports that she has been having intermittent left sided chest pain that worsens with breathing.  Pt family at bedside.     aNdine Fortune RN  01/04/19 3988

## 2019-01-04 NOTE — ED NOTES
EKG performed by tech at 1708 and was shown to Dr. Leiva at 1710.     Dotty Louise  01/04/19 3385

## 2019-01-06 LAB — BACTERIA SPEC AEROBE CULT: NORMAL

## 2019-01-09 LAB
BACTERIA SPEC AEROBE CULT: NORMAL
BACTERIA SPEC AEROBE CULT: NORMAL

## 2019-01-25 ENCOUNTER — APPOINTMENT (OUTPATIENT)
Dept: GENERAL RADIOLOGY | Facility: HOSPITAL | Age: 74
End: 2019-01-25

## 2019-01-25 ENCOUNTER — HOSPITAL ENCOUNTER (INPATIENT)
Facility: HOSPITAL | Age: 74
LOS: 6 days | Discharge: HOME | End: 2019-02-02
Attending: EMERGENCY MEDICINE | Admitting: INTERNAL MEDICINE

## 2019-01-25 DIAGNOSIS — J44.1 COPD EXACERBATION (HCC): ICD-10-CM

## 2019-01-25 DIAGNOSIS — J18.9 PNEUMONIA OF RIGHT LOWER LOBE DUE TO INFECTIOUS ORGANISM: Primary | ICD-10-CM

## 2019-01-25 LAB
A-A DO2: 34 MMHG (ref 0–300)
ALBUMIN SERPL-MCNC: 4.4 G/DL (ref 3.4–4.8)
ALBUMIN/GLOB SERPL: 1.5 G/DL (ref 1.5–2.5)
ALP SERPL-CCNC: 135 U/L (ref 35–104)
ALT SERPL W P-5'-P-CCNC: 28 U/L (ref 10–36)
ANION GAP SERPL CALCULATED.3IONS-SCNC: 7.5 MMOL/L (ref 3.6–11.2)
ARTERIAL PATENCY WRIST A: POSITIVE
AST SERPL-CCNC: 20 U/L (ref 10–30)
ATMOSPHERIC PRESS: 732 MMHG
BACTERIA UR QL AUTO: ABNORMAL /HPF
BASE EXCESS BLDA CALC-SCNC: -1.6 MMOL/L
BASOPHILS # BLD AUTO: 0.03 10*3/MM3 (ref 0–0.3)
BASOPHILS NFR BLD AUTO: 0.3 % (ref 0–2)
BDY SITE: ABNORMAL
BILIRUB SERPL-MCNC: 0.5 MG/DL (ref 0.2–1.8)
BILIRUB UR QL STRIP: NEGATIVE
BODY TEMPERATURE: 98.9 C
BUN BLD-MCNC: 22 MG/DL (ref 7–21)
BUN/CREAT SERPL: 17.1 (ref 7–25)
CALCIUM SPEC-SCNC: 9.3 MG/DL (ref 7.7–10)
CHLORIDE SERPL-SCNC: 106 MMOL/L (ref 99–112)
CLARITY UR: CLEAR
CO2 SERPL-SCNC: 26.5 MMOL/L (ref 24.3–31.9)
COHGB MFR BLD: 1.3 % (ref 0–5)
COLOR UR: YELLOW
CREAT BLD-MCNC: 1.29 MG/DL (ref 0.43–1.29)
D-LACTATE SERPL-SCNC: 1.1 MMOL/L (ref 0.5–2)
DEPRECATED RDW RBC AUTO: 43.3 FL (ref 37–54)
EOSINOPHIL # BLD AUTO: 0.28 10*3/MM3 (ref 0–0.7)
EOSINOPHIL NFR BLD AUTO: 2.8 % (ref 0–7)
ERYTHROCYTE [DISTWIDTH] IN BLOOD BY AUTOMATED COUNT: 14 % (ref 11.5–14.5)
FLUAV AG NPH QL: NEGATIVE
FLUBV AG NPH QL IA: NEGATIVE
GFR SERPL CREATININE-BSD FRML MDRD: 41 ML/MIN/1.73
GLOBULIN UR ELPH-MCNC: 2.9 GM/DL
GLUCOSE BLD-MCNC: 110 MG/DL (ref 70–110)
GLUCOSE BLDC GLUCOMTR-MCNC: 292 MG/DL (ref 70–130)
GLUCOSE UR STRIP-MCNC: NEGATIVE MG/DL
HCO3 BLDA-SCNC: 22.7 MMOL/L (ref 22–26)
HCT VFR BLD AUTO: 36.8 % (ref 37–47)
HCT VFR BLD CALC: 36 % (ref 37–47)
HGB BLD-MCNC: 12 G/DL (ref 12–16)
HGB BLDA-MCNC: 12.1 G/DL (ref 12–16)
HGB UR QL STRIP.AUTO: NEGATIVE
HOROWITZ INDEX BLD+IHG-RTO: 21 %
HYALINE CASTS UR QL AUTO: ABNORMAL /LPF
IMM GRANULOCYTES # BLD AUTO: 0.02 10*3/MM3 (ref 0–0.03)
IMM GRANULOCYTES NFR BLD AUTO: 0.2 % (ref 0–0.5)
KETONES UR QL STRIP: NEGATIVE
LEUKOCYTE ESTERASE UR QL STRIP.AUTO: ABNORMAL
LYMPHOCYTES # BLD AUTO: 1.22 10*3/MM3 (ref 1–3)
LYMPHOCYTES NFR BLD AUTO: 12.2 % (ref 16–46)
MCH RBC QN AUTO: 27.8 PG (ref 27–33)
MCHC RBC AUTO-ENTMCNC: 32.6 G/DL (ref 33–37)
MCV RBC AUTO: 85.4 FL (ref 80–94)
METHGB BLD QL: 0.2 % (ref 0–3)
MODALITY: ABNORMAL
MONOCYTES # BLD AUTO: 0.79 10*3/MM3 (ref 0.1–0.9)
MONOCYTES NFR BLD AUTO: 7.9 % (ref 0–12)
NEUTROPHILS # BLD AUTO: 7.69 10*3/MM3 (ref 1.4–6.5)
NEUTROPHILS NFR BLD AUTO: 76.6 % (ref 40–75)
NITRITE UR QL STRIP: NEGATIVE
OSMOLALITY SERPL CALC.SUM OF ELEC: 283.4 MOSM/KG (ref 273–305)
OXYHGB MFR BLDV: 91.9 % (ref 85–100)
PCO2 BLDA: 36.9 MM HG (ref 35–45)
PH BLDA: 7.41 PH UNITS (ref 7.35–7.45)
PH UR STRIP.AUTO: <=5 [PH] (ref 5–8)
PLATELET # BLD AUTO: 187 10*3/MM3 (ref 130–400)
PMV BLD AUTO: 11.4 FL (ref 6–10)
PO2 BLDA: 64.3 MM HG (ref 80–100)
POTASSIUM BLD-SCNC: 3.8 MMOL/L (ref 3.5–5.3)
PROT SERPL-MCNC: 7.3 G/DL (ref 6–8)
PROT UR QL STRIP: NEGATIVE
RBC # BLD AUTO: 4.31 10*6/MM3 (ref 4.2–5.4)
RBC # UR: ABNORMAL /HPF
REF LAB TEST METHOD: ABNORMAL
SAO2 % BLDCOA: 93.3 % (ref 90–100)
SODIUM BLD-SCNC: 140 MMOL/L (ref 135–153)
SP GR UR STRIP: 1.02 (ref 1–1.03)
SQUAMOUS #/AREA URNS HPF: ABNORMAL /HPF
TROPONIN I SERPL-MCNC: <0.006 NG/ML
UROBILINOGEN UR QL STRIP: ABNORMAL
WBC NRBC COR # BLD: 10.03 10*3/MM3 (ref 4.5–12.5)
WBC UR QL AUTO: ABNORMAL /HPF

## 2019-01-25 PROCEDURE — 94799 UNLISTED PULMONARY SVC/PX: CPT

## 2019-01-25 PROCEDURE — 71046 X-RAY EXAM CHEST 2 VIEWS: CPT | Performed by: RADIOLOGY

## 2019-01-25 PROCEDURE — 71046 X-RAY EXAM CHEST 2 VIEWS: CPT

## 2019-01-25 PROCEDURE — 25010000002 AZITHROMYCIN: Performed by: EMERGENCY MEDICINE

## 2019-01-25 PROCEDURE — 25010000002 METHYLPREDNISOLONE PER 125 MG: Performed by: EMERGENCY MEDICINE

## 2019-01-25 PROCEDURE — 82962 GLUCOSE BLOOD TEST: CPT

## 2019-01-25 PROCEDURE — 36600 WITHDRAWAL OF ARTERIAL BLOOD: CPT | Performed by: EMERGENCY MEDICINE

## 2019-01-25 PROCEDURE — 25010000002 ONDANSETRON PER 1 MG: Performed by: EMERGENCY MEDICINE

## 2019-01-25 PROCEDURE — 82805 BLOOD GASES W/O2 SATURATION: CPT | Performed by: EMERGENCY MEDICINE

## 2019-01-25 PROCEDURE — 93010 ELECTROCARDIOGRAM REPORT: CPT | Performed by: INTERNAL MEDICINE

## 2019-01-25 PROCEDURE — 93005 ELECTROCARDIOGRAM TRACING: CPT | Performed by: EMERGENCY MEDICINE

## 2019-01-25 PROCEDURE — 81001 URINALYSIS AUTO W/SCOPE: CPT | Performed by: EMERGENCY MEDICINE

## 2019-01-25 PROCEDURE — 84484 ASSAY OF TROPONIN QUANT: CPT | Performed by: EMERGENCY MEDICINE

## 2019-01-25 PROCEDURE — 85025 COMPLETE CBC W/AUTO DIFF WBC: CPT | Performed by: EMERGENCY MEDICINE

## 2019-01-25 PROCEDURE — 63710000001 INSULIN ASPART PER 5 UNITS: Performed by: PHYSICIAN ASSISTANT

## 2019-01-25 PROCEDURE — 99284 EMERGENCY DEPT VISIT MOD MDM: CPT

## 2019-01-25 PROCEDURE — G0378 HOSPITAL OBSERVATION PER HR: HCPCS

## 2019-01-25 PROCEDURE — 87040 BLOOD CULTURE FOR BACTERIA: CPT | Performed by: EMERGENCY MEDICINE

## 2019-01-25 PROCEDURE — 94640 AIRWAY INHALATION TREATMENT: CPT

## 2019-01-25 PROCEDURE — 87804 INFLUENZA ASSAY W/OPTIC: CPT | Performed by: EMERGENCY MEDICINE

## 2019-01-25 PROCEDURE — 83605 ASSAY OF LACTIC ACID: CPT | Performed by: EMERGENCY MEDICINE

## 2019-01-25 PROCEDURE — 25010000002 CEFTRIAXONE: Performed by: EMERGENCY MEDICINE

## 2019-01-25 PROCEDURE — 80053 COMPREHEN METABOLIC PANEL: CPT | Performed by: EMERGENCY MEDICINE

## 2019-01-25 PROCEDURE — 83050 HGB METHEMOGLOBIN QUAN: CPT | Performed by: EMERGENCY MEDICINE

## 2019-01-25 PROCEDURE — 82375 ASSAY CARBOXYHB QUANT: CPT | Performed by: EMERGENCY MEDICINE

## 2019-01-25 RX ORDER — SODIUM CHLORIDE 9 MG/ML
100 INJECTION, SOLUTION INTRAVENOUS CONTINUOUS
Status: DISCONTINUED | OUTPATIENT
Start: 2019-01-25 | End: 2019-01-27

## 2019-01-25 RX ORDER — NICOTINE POLACRILEX 4 MG
15 LOZENGE BUCCAL
Status: DISCONTINUED | OUTPATIENT
Start: 2019-01-25 | End: 2019-01-29 | Stop reason: SDUPTHER

## 2019-01-25 RX ORDER — SODIUM CHLORIDE 0.9 % (FLUSH) 0.9 %
10 SYRINGE (ML) INJECTION AS NEEDED
Status: DISCONTINUED | OUTPATIENT
Start: 2019-01-25 | End: 2019-02-02 | Stop reason: HOSPADM

## 2019-01-25 RX ORDER — SODIUM CHLORIDE 0.9 % (FLUSH) 0.9 %
3-10 SYRINGE (ML) INJECTION AS NEEDED
Status: DISCONTINUED | OUTPATIENT
Start: 2019-01-25 | End: 2019-02-02 | Stop reason: HOSPADM

## 2019-01-25 RX ORDER — IPRATROPIUM BROMIDE AND ALBUTEROL SULFATE 2.5; .5 MG/3ML; MG/3ML
3 SOLUTION RESPIRATORY (INHALATION) ONCE
Status: COMPLETED | OUTPATIENT
Start: 2019-01-25 | End: 2019-01-25

## 2019-01-25 RX ORDER — SODIUM CHLORIDE 0.9 % (FLUSH) 0.9 %
3 SYRINGE (ML) INJECTION EVERY 12 HOURS SCHEDULED
Status: DISCONTINUED | OUTPATIENT
Start: 2019-01-25 | End: 2019-02-02 | Stop reason: HOSPADM

## 2019-01-25 RX ORDER — DEXTROSE MONOHYDRATE 25 G/50ML
25 INJECTION, SOLUTION INTRAVENOUS
Status: DISCONTINUED | OUTPATIENT
Start: 2019-01-25 | End: 2019-01-29 | Stop reason: SDUPTHER

## 2019-01-25 RX ORDER — CALCIUM CARBONATE 200(500)MG
2 TABLET,CHEWABLE ORAL 2 TIMES DAILY PRN
Status: DISCONTINUED | OUTPATIENT
Start: 2019-01-25 | End: 2019-02-02 | Stop reason: HOSPADM

## 2019-01-25 RX ORDER — IPRATROPIUM BROMIDE AND ALBUTEROL SULFATE 2.5; .5 MG/3ML; MG/3ML
3 SOLUTION RESPIRATORY (INHALATION) EVERY 4 HOURS PRN
Status: DISCONTINUED | OUTPATIENT
Start: 2019-01-25 | End: 2019-02-02 | Stop reason: HOSPADM

## 2019-01-25 RX ORDER — METHYLPREDNISOLONE SODIUM SUCCINATE 125 MG/2ML
125 INJECTION, POWDER, LYOPHILIZED, FOR SOLUTION INTRAMUSCULAR; INTRAVENOUS ONCE
Status: COMPLETED | OUTPATIENT
Start: 2019-01-25 | End: 2019-01-25

## 2019-01-25 RX ORDER — ACETAMINOPHEN 325 MG/1
650 TABLET ORAL EVERY 4 HOURS PRN
Status: DISCONTINUED | OUTPATIENT
Start: 2019-01-25 | End: 2019-02-02 | Stop reason: HOSPADM

## 2019-01-25 RX ORDER — METHYLPREDNISOLONE SODIUM SUCCINATE 40 MG/ML
30 INJECTION, POWDER, LYOPHILIZED, FOR SOLUTION INTRAMUSCULAR; INTRAVENOUS EVERY 12 HOURS SCHEDULED
Status: DISCONTINUED | OUTPATIENT
Start: 2019-01-26 | End: 2019-01-27

## 2019-01-25 RX ORDER — ONDANSETRON 2 MG/ML
4 INJECTION INTRAMUSCULAR; INTRAVENOUS EVERY 6 HOURS PRN
Status: DISCONTINUED | OUTPATIENT
Start: 2019-01-25 | End: 2019-02-02 | Stop reason: HOSPADM

## 2019-01-25 RX ORDER — ONDANSETRON 2 MG/ML
4 INJECTION INTRAMUSCULAR; INTRAVENOUS ONCE
Status: COMPLETED | OUTPATIENT
Start: 2019-01-25 | End: 2019-01-25

## 2019-01-25 RX ORDER — NITROGLYCERIN 0.4 MG/1
0.4 TABLET SUBLINGUAL
Status: DISCONTINUED | OUTPATIENT
Start: 2019-01-25 | End: 2019-02-02 | Stop reason: HOSPADM

## 2019-01-25 RX ORDER — SODIUM CHLORIDE 0.9 % (FLUSH) 0.9 %
5 SYRINGE (ML) INJECTION AS NEEDED
Status: DISCONTINUED | OUTPATIENT
Start: 2019-01-25 | End: 2019-02-02 | Stop reason: HOSPADM

## 2019-01-25 RX ADMIN — ONDANSETRON 4 MG: 2 INJECTION, SOLUTION INTRAMUSCULAR; INTRAVENOUS at 15:42

## 2019-01-25 RX ADMIN — SODIUM CHLORIDE 1000 ML: 9 INJECTION, SOLUTION INTRAVENOUS at 15:42

## 2019-01-25 RX ADMIN — METHYLPREDNISOLONE SODIUM SUCCINATE 125 MG: 125 INJECTION, POWDER, FOR SOLUTION INTRAMUSCULAR; INTRAVENOUS at 15:42

## 2019-01-25 RX ADMIN — DOXYCYCLINE 100 MG: 100 INJECTION, POWDER, LYOPHILIZED, FOR SOLUTION INTRAVENOUS at 21:00

## 2019-01-25 RX ADMIN — SODIUM CHLORIDE, PRESERVATIVE FREE 3 ML: 5 INJECTION INTRAVENOUS at 21:01

## 2019-01-25 RX ADMIN — SODIUM CHLORIDE 1000 ML: 9 INJECTION, SOLUTION INTRAVENOUS at 19:15

## 2019-01-25 RX ADMIN — INSULIN ASPART 4 UNITS: 100 INJECTION, SOLUTION INTRAVENOUS; SUBCUTANEOUS at 22:25

## 2019-01-25 RX ADMIN — SODIUM CHLORIDE, PRESERVATIVE FREE 3 ML: 5 INJECTION INTRAVENOUS at 21:00

## 2019-01-25 RX ADMIN — METHYLPREDNISOLONE SODIUM SUCCINATE 30 MG: 40 INJECTION, POWDER, FOR SOLUTION INTRAMUSCULAR; INTRAVENOUS at 21:00

## 2019-01-25 RX ADMIN — AZITHROMYCIN MONOHYDRATE 500 MG: 500 INJECTION, POWDER, LYOPHILIZED, FOR SOLUTION INTRAVENOUS at 19:57

## 2019-01-25 RX ADMIN — CEFTRIAXONE 1 G: 1 INJECTION, POWDER, FOR SOLUTION INTRAMUSCULAR; INTRAVENOUS at 19:24

## 2019-01-25 RX ADMIN — IPRATROPIUM BROMIDE AND ALBUTEROL SULFATE 3 ML: .5; 3 SOLUTION RESPIRATORY (INHALATION) at 15:44

## 2019-01-25 RX ADMIN — IPRATROPIUM BROMIDE AND ALBUTEROL SULFATE 3 ML: .5; 3 SOLUTION RESPIRATORY (INHALATION) at 23:05

## 2019-01-25 RX ADMIN — SODIUM CHLORIDE 100 ML/HR: 900 INJECTION INTRAVENOUS at 21:06

## 2019-01-26 LAB
A-A DO2: 34.4 MMHG (ref 0–300)
ALBUMIN SERPL-MCNC: 3.5 G/DL (ref 3.4–4.8)
ALBUMIN/GLOB SERPL: 1.5 G/DL (ref 1.5–2.5)
ALP SERPL-CCNC: 105 U/L (ref 35–104)
ALT SERPL W P-5'-P-CCNC: 25 U/L (ref 10–36)
ANION GAP SERPL CALCULATED.3IONS-SCNC: 6.8 MMOL/L (ref 3.6–11.2)
ARTERIAL PATENCY WRIST A: POSITIVE
AST SERPL-CCNC: 17 U/L (ref 10–30)
ATMOSPHERIC PRESS: 732 MMHG
BASE EXCESS BLDA CALC-SCNC: -6.5 MMOL/L
BDY SITE: ABNORMAL
BILIRUB SERPL-MCNC: 0.2 MG/DL (ref 0.2–1.8)
BODY TEMPERATURE: 98.6 C
BUN BLD-MCNC: 23 MG/DL (ref 7–21)
BUN/CREAT SERPL: 18.5 (ref 7–25)
CALCIUM SPEC-SCNC: 8.4 MG/DL (ref 7.7–10)
CHLORIDE SERPL-SCNC: 110 MMOL/L (ref 99–112)
CO2 SERPL-SCNC: 19.2 MMOL/L (ref 24.3–31.9)
COHGB MFR BLD: 1.2 % (ref 0–5)
CREAT BLD-MCNC: 1.24 MG/DL (ref 0.43–1.29)
CRP SERPL-MCNC: 2.66 MG/DL (ref 0–0.99)
D-LACTATE SERPL-SCNC: 2.4 MMOL/L (ref 0.5–2)
D-LACTATE SERPL-SCNC: 2.5 MMOL/L (ref 0.5–2)
D-LACTATE SERPL-SCNC: 3 MMOL/L (ref 0.5–2)
DEPRECATED RDW RBC AUTO: 49.6 FL (ref 37–54)
ERYTHROCYTE [DISTWIDTH] IN BLOOD BY AUTOMATED COUNT: 14.7 % (ref 11.5–14.5)
GFR SERPL CREATININE-BSD FRML MDRD: 43 ML/MIN/1.73
GLOBULIN UR ELPH-MCNC: 2.4 GM/DL
GLUCOSE BLD-MCNC: 290 MG/DL (ref 70–110)
GLUCOSE BLDC GLUCOMTR-MCNC: 173 MG/DL (ref 70–130)
GLUCOSE BLDC GLUCOMTR-MCNC: 265 MG/DL (ref 70–130)
GLUCOSE BLDC GLUCOMTR-MCNC: 299 MG/DL (ref 70–130)
GLUCOSE BLDC GLUCOMTR-MCNC: 301 MG/DL (ref 70–130)
HCO3 BLDA-SCNC: 19.2 MMOL/L (ref 22–26)
HCT VFR BLD AUTO: 35.3 % (ref 37–47)
HCT VFR BLD CALC: 31 % (ref 37–47)
HGB BLD-MCNC: 10.6 G/DL (ref 12–16)
HGB BLDA-MCNC: 10.6 G/DL (ref 12–16)
HOLD SPECIMEN: NORMAL
HOROWITZ INDEX BLD+IHG-RTO: 21 %
HYPOCHROMIA BLD QL: ABNORMAL
LYMPHOCYTES # BLD MANUAL: 0.35 10*3/MM3 (ref 1–3)
LYMPHOCYTES NFR BLD MANUAL: 1 % (ref 0–12)
LYMPHOCYTES NFR BLD MANUAL: 6 % (ref 16–46)
MCH RBC QN AUTO: 27.7 PG (ref 27–33)
MCHC RBC AUTO-ENTMCNC: 30 G/DL (ref 33–37)
MCV RBC AUTO: 92.2 FL (ref 80–94)
METHGB BLD QL: 0.3 % (ref 0–3)
MODALITY: ABNORMAL
MONOCYTES # BLD AUTO: 0.06 10*3/MM3 (ref 0.1–0.9)
NEUTROPHILS # BLD AUTO: 5.44 10*3/MM3 (ref 1.4–6.5)
NEUTROPHILS NFR BLD MANUAL: 92 % (ref 40–75)
NEUTS BAND NFR BLD MANUAL: 1 % (ref 0–8)
OSMOLALITY SERPL CALC.SUM OF ELEC: 286.3 MOSM/KG (ref 273–305)
OXYHGB MFR BLDV: 89.5 % (ref 85–100)
PCO2 BLDA: 38.7 MM HG (ref 35–45)
PH BLDA: 7.31 PH UNITS (ref 7.35–7.45)
PLAT MORPH BLD: NORMAL
PLATELET # BLD AUTO: 156 10*3/MM3 (ref 130–400)
PMV BLD AUTO: 11.5 FL (ref 6–10)
PO2 BLDA: 63.1 MM HG (ref 80–100)
POTASSIUM BLD-SCNC: 4 MMOL/L (ref 3.5–5.3)
PROT SERPL-MCNC: 5.9 G/DL (ref 6–8)
RBC # BLD AUTO: 3.83 10*6/MM3 (ref 4.2–5.4)
SAO2 % BLDCOA: 90.9 % (ref 90–100)
SCAN SLIDE: NORMAL
SODIUM BLD-SCNC: 136 MMOL/L (ref 135–153)
TROPONIN I SERPL-MCNC: <0.006 NG/ML
WBC NRBC COR # BLD: 5.85 10*3/MM3 (ref 4.5–12.5)

## 2019-01-26 PROCEDURE — 25010000002 METHYLPREDNISOLONE PER 40 MG: Performed by: PHYSICIAN ASSISTANT

## 2019-01-26 PROCEDURE — 83605 ASSAY OF LACTIC ACID: CPT | Performed by: PHYSICIAN ASSISTANT

## 2019-01-26 PROCEDURE — 86140 C-REACTIVE PROTEIN: CPT | Performed by: PHYSICIAN ASSISTANT

## 2019-01-26 PROCEDURE — 63710000001 INSULIN ASPART PER 5 UNITS: Performed by: PHYSICIAN ASSISTANT

## 2019-01-26 PROCEDURE — 94640 AIRWAY INHALATION TREATMENT: CPT

## 2019-01-26 PROCEDURE — 80053 COMPREHEN METABOLIC PANEL: CPT | Performed by: PHYSICIAN ASSISTANT

## 2019-01-26 PROCEDURE — 94799 UNLISTED PULMONARY SVC/PX: CPT

## 2019-01-26 PROCEDURE — 84484 ASSAY OF TROPONIN QUANT: CPT | Performed by: PHYSICIAN ASSISTANT

## 2019-01-26 PROCEDURE — 25010000002 CEFTRIAXONE: Performed by: PHYSICIAN ASSISTANT

## 2019-01-26 PROCEDURE — 82962 GLUCOSE BLOOD TEST: CPT

## 2019-01-26 PROCEDURE — 85025 COMPLETE CBC W/AUTO DIFF WBC: CPT | Performed by: PHYSICIAN ASSISTANT

## 2019-01-26 PROCEDURE — 36600 WITHDRAWAL OF ARTERIAL BLOOD: CPT | Performed by: PHYSICIAN ASSISTANT

## 2019-01-26 PROCEDURE — 82805 BLOOD GASES W/O2 SATURATION: CPT | Performed by: PHYSICIAN ASSISTANT

## 2019-01-26 PROCEDURE — G0378 HOSPITAL OBSERVATION PER HR: HCPCS

## 2019-01-26 PROCEDURE — 82375 ASSAY CARBOXYHB QUANT: CPT | Performed by: PHYSICIAN ASSISTANT

## 2019-01-26 PROCEDURE — 83050 HGB METHEMOGLOBIN QUAN: CPT | Performed by: PHYSICIAN ASSISTANT

## 2019-01-26 RX ORDER — ATORVASTATIN CALCIUM 20 MG/1
20 TABLET, FILM COATED ORAL DAILY
COMMUNITY
End: 2021-10-22 | Stop reason: SDUPTHER

## 2019-01-26 RX ORDER — ATENOLOL 25 MG/1
25 TABLET ORAL DAILY
COMMUNITY
End: 2019-03-15

## 2019-01-26 RX ORDER — BUDESONIDE AND FORMOTEROL FUMARATE DIHYDRATE 80; 4.5 UG/1; UG/1
2 AEROSOL RESPIRATORY (INHALATION)
Status: DISCONTINUED | OUTPATIENT
Start: 2019-01-26 | End: 2019-02-02 | Stop reason: HOSPADM

## 2019-01-26 RX ORDER — FUROSEMIDE 20 MG/1
20 TABLET ORAL DAILY
COMMUNITY
End: 2019-02-28 | Stop reason: ALTCHOICE

## 2019-01-26 RX ORDER — BUMETANIDE 1 MG/1
1 TABLET ORAL DAILY
Status: CANCELLED | OUTPATIENT
Start: 2019-01-26

## 2019-01-26 RX ORDER — FUROSEMIDE 20 MG/1
20 TABLET ORAL DAILY
Status: DISCONTINUED | OUTPATIENT
Start: 2019-01-27 | End: 2019-01-29

## 2019-01-26 RX ORDER — POTASSIUM CHLORIDE 20 MEQ/1
20 TABLET, EXTENDED RELEASE ORAL DAILY
Status: DISCONTINUED | OUTPATIENT
Start: 2019-01-27 | End: 2019-02-02 | Stop reason: HOSPADM

## 2019-01-26 RX ORDER — LISINOPRIL 2.5 MG/1
5 TABLET ORAL DAILY
Status: DISCONTINUED | OUTPATIENT
Start: 2019-01-27 | End: 2019-02-02 | Stop reason: HOSPADM

## 2019-01-26 RX ORDER — PANTOPRAZOLE SODIUM 40 MG/1
40 TABLET, DELAYED RELEASE ORAL EVERY MORNING
Status: DISCONTINUED | OUTPATIENT
Start: 2019-01-26 | End: 2019-02-02 | Stop reason: HOSPADM

## 2019-01-26 RX ORDER — ATENOLOL 25 MG/1
25 TABLET ORAL DAILY
Status: DISCONTINUED | OUTPATIENT
Start: 2019-01-27 | End: 2019-02-02 | Stop reason: HOSPADM

## 2019-01-26 RX ORDER — ATORVASTATIN CALCIUM 20 MG/1
20 TABLET, FILM COATED ORAL DAILY
Status: DISCONTINUED | OUTPATIENT
Start: 2019-01-26 | End: 2019-02-02 | Stop reason: HOSPADM

## 2019-01-26 RX ORDER — ALBUTEROL SULFATE 2.5 MG/3ML
2.5 SOLUTION RESPIRATORY (INHALATION) EVERY 4 HOURS PRN
Status: CANCELLED | OUTPATIENT
Start: 2019-01-26

## 2019-01-26 RX ORDER — PANTOPRAZOLE SODIUM 40 MG/1
40 TABLET, DELAYED RELEASE ORAL DAILY
Status: ON HOLD | COMMUNITY
End: 2020-05-13

## 2019-01-26 RX ORDER — BUMETANIDE 1 MG/1
1 TABLET ORAL DAILY
Status: ON HOLD | COMMUNITY
End: 2019-12-20 | Stop reason: SDUPTHER

## 2019-01-26 RX ORDER — POTASSIUM CHLORIDE 20 MEQ/1
20 TABLET, EXTENDED RELEASE ORAL DAILY
Status: ON HOLD | COMMUNITY
End: 2019-12-20 | Stop reason: SDUPTHER

## 2019-01-26 RX ADMIN — INSULIN ASPART 4 UNITS: 100 INJECTION, SOLUTION INTRAVENOUS; SUBCUTANEOUS at 11:42

## 2019-01-26 RX ADMIN — IPRATROPIUM BROMIDE AND ALBUTEROL SULFATE 3 ML: .5; 3 SOLUTION RESPIRATORY (INHALATION) at 22:40

## 2019-01-26 RX ADMIN — APIXABAN 5 MG: 5 TABLET, FILM COATED ORAL at 17:56

## 2019-01-26 RX ADMIN — INSULIN ASPART 4 UNITS: 100 INJECTION, SOLUTION INTRAVENOUS; SUBCUTANEOUS at 17:29

## 2019-01-26 RX ADMIN — CEFTRIAXONE 2 G: 2 INJECTION, POWDER, FOR SOLUTION INTRAMUSCULAR; INTRAVENOUS at 12:15

## 2019-01-26 RX ADMIN — SODIUM CHLORIDE, PRESERVATIVE FREE 3 ML: 5 INJECTION INTRAVENOUS at 10:27

## 2019-01-26 RX ADMIN — SODIUM CHLORIDE 100 ML/HR: 900 INJECTION INTRAVENOUS at 07:34

## 2019-01-26 RX ADMIN — METHYLPREDNISOLONE SODIUM SUCCINATE 30 MG: 40 INJECTION, POWDER, FOR SOLUTION INTRAMUSCULAR; INTRAVENOUS at 05:52

## 2019-01-26 RX ADMIN — ATORVASTATIN CALCIUM 20 MG: 20 TABLET, FILM COATED ORAL at 16:23

## 2019-01-26 RX ADMIN — DOXYCYCLINE 100 MG: 100 INJECTION, POWDER, LYOPHILIZED, FOR SOLUTION INTRAVENOUS at 21:04

## 2019-01-26 RX ADMIN — SODIUM CHLORIDE, PRESERVATIVE FREE 3 ML: 5 INJECTION INTRAVENOUS at 12:15

## 2019-01-26 RX ADMIN — SODIUM CHLORIDE 100 ML/HR: 900 INJECTION INTRAVENOUS at 14:45

## 2019-01-26 RX ADMIN — DOXYCYCLINE 100 MG: 100 INJECTION, POWDER, LYOPHILIZED, FOR SOLUTION INTRAVENOUS at 10:25

## 2019-01-26 RX ADMIN — INSULIN ASPART 5 UNITS: 100 INJECTION, SOLUTION INTRAVENOUS; SUBCUTANEOUS at 07:35

## 2019-01-26 RX ADMIN — PANTOPRAZOLE SODIUM 40 MG: 40 TABLET, DELAYED RELEASE ORAL at 16:23

## 2019-01-26 RX ADMIN — BUDESONIDE AND FORMOTEROL FUMARATE DIHYDRATE 2 PUFF: 80; 4.5 AEROSOL RESPIRATORY (INHALATION) at 22:40

## 2019-01-26 RX ADMIN — INSULIN ASPART 4 UNITS: 100 INJECTION, SOLUTION INTRAVENOUS; SUBCUTANEOUS at 20:28

## 2019-01-26 RX ADMIN — SODIUM CHLORIDE 100 ML/HR: 900 INJECTION INTRAVENOUS at 20:28

## 2019-01-26 RX ADMIN — METHYLPREDNISOLONE SODIUM SUCCINATE 30 MG: 40 INJECTION, POWDER, FOR SOLUTION INTRAMUSCULAR; INTRAVENOUS at 20:28

## 2019-01-26 RX ADMIN — METFORMIN HYDROCHLORIDE 500 MG: 500 TABLET ORAL at 17:56

## 2019-01-26 RX ADMIN — IPRATROPIUM BROMIDE AND ALBUTEROL SULFATE 3 ML: .5; 3 SOLUTION RESPIRATORY (INHALATION) at 16:53

## 2019-01-27 ENCOUNTER — APPOINTMENT (OUTPATIENT)
Dept: CARDIOLOGY | Facility: HOSPITAL | Age: 74
End: 2019-01-27
Attending: INTERNAL MEDICINE

## 2019-01-27 LAB
ANION GAP SERPL CALCULATED.3IONS-SCNC: 8.9 MMOL/L (ref 3.6–11.2)
BASOPHILS # BLD AUTO: 0.01 10*3/MM3 (ref 0–0.3)
BASOPHILS NFR BLD AUTO: 0.1 % (ref 0–2)
BNP SERPL-MCNC: 374 PG/ML (ref 0–100)
BUN BLD-MCNC: 26 MG/DL (ref 7–21)
BUN/CREAT SERPL: 23.6 (ref 7–25)
CALCIUM SPEC-SCNC: 9.2 MG/DL (ref 7.7–10)
CHLORIDE SERPL-SCNC: 115 MMOL/L (ref 99–112)
CO2 SERPL-SCNC: 18.1 MMOL/L (ref 24.3–31.9)
CREAT BLD-MCNC: 1.1 MG/DL (ref 0.43–1.29)
CRP SERPL-MCNC: 0.68 MG/DL (ref 0–0.99)
D-LACTATE SERPL-SCNC: 2.2 MMOL/L (ref 0.5–2)
DEPRECATED RDW RBC AUTO: 45.3 FL (ref 37–54)
EOSINOPHIL # BLD AUTO: 0.01 10*3/MM3 (ref 0–0.7)
EOSINOPHIL NFR BLD AUTO: 0.1 % (ref 0–7)
ERYTHROCYTE [DISTWIDTH] IN BLOOD BY AUTOMATED COUNT: 14.5 % (ref 11.5–14.5)
GFR SERPL CREATININE-BSD FRML MDRD: 49 ML/MIN/1.73
GLUCOSE BLD-MCNC: 176 MG/DL (ref 70–110)
GLUCOSE BLDC GLUCOMTR-MCNC: 154 MG/DL (ref 70–130)
GLUCOSE BLDC GLUCOMTR-MCNC: 164 MG/DL (ref 70–130)
GLUCOSE BLDC GLUCOMTR-MCNC: 189 MG/DL (ref 70–130)
GLUCOSE BLDC GLUCOMTR-MCNC: 208 MG/DL (ref 70–130)
GLUCOSE BLDC GLUCOMTR-MCNC: 259 MG/DL (ref 70–130)
HCT VFR BLD AUTO: 33.2 % (ref 37–47)
HGB BLD-MCNC: 10.6 G/DL (ref 12–16)
IMM GRANULOCYTES # BLD AUTO: 0.05 10*3/MM3 (ref 0–0.03)
IMM GRANULOCYTES NFR BLD AUTO: 0.4 % (ref 0–0.5)
LYMPHOCYTES # BLD AUTO: 1.08 10*3/MM3 (ref 1–3)
LYMPHOCYTES NFR BLD AUTO: 9.1 % (ref 16–46)
MCH RBC QN AUTO: 27.3 PG (ref 27–33)
MCHC RBC AUTO-ENTMCNC: 31.9 G/DL (ref 33–37)
MCV RBC AUTO: 85.6 FL (ref 80–94)
MONOCYTES # BLD AUTO: 0.99 10*3/MM3 (ref 0.1–0.9)
MONOCYTES NFR BLD AUTO: 8.3 % (ref 0–12)
NEUTROPHILS # BLD AUTO: 9.76 10*3/MM3 (ref 1.4–6.5)
NEUTROPHILS NFR BLD AUTO: 82 % (ref 40–75)
OSMOLALITY SERPL CALC.SUM OF ELEC: 292.2 MOSM/KG (ref 273–305)
PLATELET # BLD AUTO: 189 10*3/MM3 (ref 130–400)
PMV BLD AUTO: 11.7 FL (ref 6–10)
POTASSIUM BLD-SCNC: 4.5 MMOL/L (ref 3.5–5.3)
RBC # BLD AUTO: 3.88 10*6/MM3 (ref 4.2–5.4)
SODIUM BLD-SCNC: 142 MMOL/L (ref 135–153)
TROPONIN I SERPL-MCNC: <0.006 NG/ML
WBC NRBC COR # BLD: 11.9 10*3/MM3 (ref 4.5–12.5)

## 2019-01-27 PROCEDURE — 83880 ASSAY OF NATRIURETIC PEPTIDE: CPT | Performed by: PHYSICIAN ASSISTANT

## 2019-01-27 PROCEDURE — 25010000002 METHYLPREDNISOLONE PER 40 MG: Performed by: PHYSICIAN ASSISTANT

## 2019-01-27 PROCEDURE — 25010000002 CEFTRIAXONE: Performed by: PHYSICIAN ASSISTANT

## 2019-01-27 PROCEDURE — 94799 UNLISTED PULMONARY SVC/PX: CPT

## 2019-01-27 PROCEDURE — 86140 C-REACTIVE PROTEIN: CPT | Performed by: PHYSICIAN ASSISTANT

## 2019-01-27 PROCEDURE — 82962 GLUCOSE BLOOD TEST: CPT

## 2019-01-27 PROCEDURE — 84484 ASSAY OF TROPONIN QUANT: CPT | Performed by: PHYSICIAN ASSISTANT

## 2019-01-27 PROCEDURE — 83605 ASSAY OF LACTIC ACID: CPT | Performed by: PHYSICIAN ASSISTANT

## 2019-01-27 PROCEDURE — 63710000001 INSULIN ASPART PER 5 UNITS: Performed by: PHYSICIAN ASSISTANT

## 2019-01-27 PROCEDURE — 85025 COMPLETE CBC W/AUTO DIFF WBC: CPT | Performed by: PHYSICIAN ASSISTANT

## 2019-01-27 PROCEDURE — 80048 BASIC METABOLIC PNL TOTAL CA: CPT | Performed by: PHYSICIAN ASSISTANT

## 2019-01-27 RX ADMIN — DOXYCYCLINE 100 MG: 100 INJECTION, POWDER, LYOPHILIZED, FOR SOLUTION INTRAVENOUS at 10:00

## 2019-01-27 RX ADMIN — POTASSIUM CHLORIDE 20 MEQ: 1500 TABLET, EXTENDED RELEASE ORAL at 08:10

## 2019-01-27 RX ADMIN — METFORMIN HYDROCHLORIDE 500 MG: 500 TABLET ORAL at 07:39

## 2019-01-27 RX ADMIN — CEFTRIAXONE 2 G: 2 INJECTION, POWDER, FOR SOLUTION INTRAMUSCULAR; INTRAVENOUS at 12:30

## 2019-01-27 RX ADMIN — SODIUM CHLORIDE, PRESERVATIVE FREE 3 ML: 5 INJECTION INTRAVENOUS at 08:13

## 2019-01-27 RX ADMIN — IPRATROPIUM BROMIDE AND ALBUTEROL SULFATE 3 ML: .5; 3 SOLUTION RESPIRATORY (INHALATION) at 08:01

## 2019-01-27 RX ADMIN — SODIUM CHLORIDE, PRESERVATIVE FREE 3 ML: 5 INJECTION INTRAVENOUS at 19:56

## 2019-01-27 RX ADMIN — METHYLPREDNISOLONE SODIUM SUCCINATE 30 MG: 40 INJECTION, POWDER, FOR SOLUTION INTRAMUSCULAR; INTRAVENOUS at 08:10

## 2019-01-27 RX ADMIN — INSULIN ASPART 2 UNITS: 100 INJECTION, SOLUTION INTRAVENOUS; SUBCUTANEOUS at 07:38

## 2019-01-27 RX ADMIN — LISINOPRIL 5 MG: 2.5 TABLET ORAL at 08:09

## 2019-01-27 RX ADMIN — METFORMIN HYDROCHLORIDE 500 MG: 500 TABLET ORAL at 16:45

## 2019-01-27 RX ADMIN — APIXABAN 5 MG: 5 TABLET, FILM COATED ORAL at 06:03

## 2019-01-27 RX ADMIN — ATORVASTATIN CALCIUM 20 MG: 20 TABLET, FILM COATED ORAL at 08:10

## 2019-01-27 RX ADMIN — INSULIN ASPART 2 UNITS: 100 INJECTION, SOLUTION INTRAVENOUS; SUBCUTANEOUS at 16:44

## 2019-01-27 RX ADMIN — BUDESONIDE AND FORMOTEROL FUMARATE DIHYDRATE 2 PUFF: 80; 4.5 AEROSOL RESPIRATORY (INHALATION) at 19:52

## 2019-01-27 RX ADMIN — IPRATROPIUM BROMIDE AND ALBUTEROL SULFATE 3 ML: .5; 3 SOLUTION RESPIRATORY (INHALATION) at 19:52

## 2019-01-27 RX ADMIN — ATENOLOL 25 MG: 25 TABLET ORAL at 08:10

## 2019-01-27 RX ADMIN — DOXYCYCLINE 100 MG: 100 INJECTION, POWDER, LYOPHILIZED, FOR SOLUTION INTRAVENOUS at 22:01

## 2019-01-27 RX ADMIN — INSULIN ASPART 2 UNITS: 100 INJECTION, SOLUTION INTRAVENOUS; SUBCUTANEOUS at 19:55

## 2019-01-27 RX ADMIN — BUDESONIDE AND FORMOTEROL FUMARATE DIHYDRATE 2 PUFF: 80; 4.5 AEROSOL RESPIRATORY (INHALATION) at 08:01

## 2019-01-27 RX ADMIN — INSULIN ASPART 3 UNITS: 100 INJECTION, SOLUTION INTRAVENOUS; SUBCUTANEOUS at 11:55

## 2019-01-27 RX ADMIN — FUROSEMIDE 20 MG: 20 TABLET ORAL at 08:10

## 2019-01-27 RX ADMIN — SODIUM CHLORIDE 100 ML/HR: 900 INJECTION INTRAVENOUS at 00:33

## 2019-01-27 RX ADMIN — APIXABAN 5 MG: 5 TABLET, FILM COATED ORAL at 16:45

## 2019-01-27 RX ADMIN — PANTOPRAZOLE SODIUM 40 MG: 40 TABLET, DELAYED RELEASE ORAL at 06:03

## 2019-01-28 ENCOUNTER — APPOINTMENT (OUTPATIENT)
Dept: CT IMAGING | Facility: HOSPITAL | Age: 74
End: 2019-01-28

## 2019-01-28 LAB
ANION GAP SERPL CALCULATED.3IONS-SCNC: 7.7 MMOL/L (ref 3.6–11.2)
BASOPHILS # BLD AUTO: 0.02 10*3/MM3 (ref 0–0.3)
BASOPHILS NFR BLD AUTO: 0.2 % (ref 0–2)
BNP SERPL-MCNC: 351 PG/ML (ref 0–100)
BUN BLD-MCNC: 28 MG/DL (ref 7–21)
BUN/CREAT SERPL: 26.7 (ref 7–25)
CALCIUM SPEC-SCNC: 9.8 MG/DL (ref 7.7–10)
CHLORIDE SERPL-SCNC: 110 MMOL/L (ref 99–112)
CO2 SERPL-SCNC: 22.3 MMOL/L (ref 24.3–31.9)
CREAT BLD-MCNC: 1.05 MG/DL (ref 0.43–1.29)
CRP SERPL-MCNC: <0.5 MG/DL (ref 0–0.99)
D-LACTATE SERPL-SCNC: 1.2 MMOL/L (ref 0.5–2)
DEPRECATED RDW RBC AUTO: 46.4 FL (ref 37–54)
EOSINOPHIL # BLD AUTO: 0.06 10*3/MM3 (ref 0–0.7)
EOSINOPHIL NFR BLD AUTO: 0.6 % (ref 0–7)
ERYTHROCYTE [DISTWIDTH] IN BLOOD BY AUTOMATED COUNT: 14.6 % (ref 11.5–14.5)
GFR SERPL CREATININE-BSD FRML MDRD: 52 ML/MIN/1.73
GLUCOSE BLD-MCNC: 101 MG/DL (ref 70–110)
GLUCOSE BLDC GLUCOMTR-MCNC: 107 MG/DL (ref 70–130)
GLUCOSE BLDC GLUCOMTR-MCNC: 122 MG/DL (ref 70–130)
GLUCOSE BLDC GLUCOMTR-MCNC: 130 MG/DL (ref 70–130)
GLUCOSE BLDC GLUCOMTR-MCNC: 94 MG/DL (ref 70–130)
HCT VFR BLD AUTO: 33 % (ref 37–47)
HGB BLD-MCNC: 10.7 G/DL (ref 12–16)
IMM GRANULOCYTES # BLD AUTO: 0.06 10*3/MM3 (ref 0–0.03)
IMM GRANULOCYTES NFR BLD AUTO: 0.6 % (ref 0–0.5)
LYMPHOCYTES # BLD AUTO: 1.82 10*3/MM3 (ref 1–3)
LYMPHOCYTES NFR BLD AUTO: 17.2 % (ref 16–46)
MCH RBC QN AUTO: 27.9 PG (ref 27–33)
MCHC RBC AUTO-ENTMCNC: 32.4 G/DL (ref 33–37)
MCV RBC AUTO: 86.2 FL (ref 80–94)
MONOCYTES # BLD AUTO: 1.1 10*3/MM3 (ref 0.1–0.9)
MONOCYTES NFR BLD AUTO: 10.4 % (ref 0–12)
NEUTROPHILS # BLD AUTO: 7.51 10*3/MM3 (ref 1.4–6.5)
NEUTROPHILS NFR BLD AUTO: 71 % (ref 40–75)
OSMOLALITY SERPL CALC.SUM OF ELEC: 285 MOSM/KG (ref 273–305)
PLATELET # BLD AUTO: 206 10*3/MM3 (ref 130–400)
PMV BLD AUTO: 11.5 FL (ref 6–10)
POTASSIUM BLD-SCNC: 4 MMOL/L (ref 3.5–5.3)
RBC # BLD AUTO: 3.83 10*6/MM3 (ref 4.2–5.4)
SODIUM BLD-SCNC: 140 MMOL/L (ref 135–153)
WBC NRBC COR # BLD: 10.57 10*3/MM3 (ref 4.5–12.5)

## 2019-01-28 PROCEDURE — 86140 C-REACTIVE PROTEIN: CPT | Performed by: INTERNAL MEDICINE

## 2019-01-28 PROCEDURE — 80048 BASIC METABOLIC PNL TOTAL CA: CPT | Performed by: INTERNAL MEDICINE

## 2019-01-28 PROCEDURE — 25010000002 CEFTRIAXONE: Performed by: PHYSICIAN ASSISTANT

## 2019-01-28 PROCEDURE — 94799 UNLISTED PULMONARY SVC/PX: CPT

## 2019-01-28 PROCEDURE — 82962 GLUCOSE BLOOD TEST: CPT

## 2019-01-28 PROCEDURE — 71260 CT THORAX DX C+: CPT | Performed by: RADIOLOGY

## 2019-01-28 PROCEDURE — 85025 COMPLETE CBC W/AUTO DIFF WBC: CPT | Performed by: INTERNAL MEDICINE

## 2019-01-28 PROCEDURE — 71260 CT THORAX DX C+: CPT

## 2019-01-28 PROCEDURE — 25010000002 IOPAMIDOL 61 % SOLUTION: Performed by: INTERNAL MEDICINE

## 2019-01-28 PROCEDURE — G0108 DIAB MANAGE TRN  PER INDIV: HCPCS

## 2019-01-28 PROCEDURE — 83605 ASSAY OF LACTIC ACID: CPT | Performed by: INTERNAL MEDICINE

## 2019-01-28 PROCEDURE — 83880 ASSAY OF NATRIURETIC PEPTIDE: CPT | Performed by: INTERNAL MEDICINE

## 2019-01-28 RX ADMIN — BUDESONIDE AND FORMOTEROL FUMARATE DIHYDRATE 2 PUFF: 80; 4.5 AEROSOL RESPIRATORY (INHALATION) at 19:59

## 2019-01-28 RX ADMIN — DOXYCYCLINE 100 MG: 100 INJECTION, POWDER, LYOPHILIZED, FOR SOLUTION INTRAVENOUS at 09:16

## 2019-01-28 RX ADMIN — BUDESONIDE AND FORMOTEROL FUMARATE DIHYDRATE 2 PUFF: 80; 4.5 AEROSOL RESPIRATORY (INHALATION) at 07:06

## 2019-01-28 RX ADMIN — LISINOPRIL 5 MG: 2.5 TABLET ORAL at 09:15

## 2019-01-28 RX ADMIN — APIXABAN 5 MG: 5 TABLET, FILM COATED ORAL at 05:48

## 2019-01-28 RX ADMIN — IPRATROPIUM BROMIDE AND ALBUTEROL SULFATE 3 ML: .5; 3 SOLUTION RESPIRATORY (INHALATION) at 07:07

## 2019-01-28 RX ADMIN — CEFTRIAXONE 2 G: 2 INJECTION, POWDER, FOR SOLUTION INTRAMUSCULAR; INTRAVENOUS at 13:23

## 2019-01-28 RX ADMIN — ATENOLOL 25 MG: 25 TABLET ORAL at 09:15

## 2019-01-28 RX ADMIN — IOPAMIDOL 100 ML: 612 INJECTION, SOLUTION INTRAVENOUS at 21:15

## 2019-01-28 RX ADMIN — PANTOPRAZOLE SODIUM 40 MG: 40 TABLET, DELAYED RELEASE ORAL at 05:48

## 2019-01-28 RX ADMIN — SODIUM CHLORIDE, PRESERVATIVE FREE 3 ML: 5 INJECTION INTRAVENOUS at 09:15

## 2019-01-28 RX ADMIN — FUROSEMIDE 20 MG: 20 TABLET ORAL at 09:15

## 2019-01-28 RX ADMIN — DOXYCYCLINE 100 MG: 100 INJECTION, POWDER, LYOPHILIZED, FOR SOLUTION INTRAVENOUS at 21:41

## 2019-01-28 RX ADMIN — ATORVASTATIN CALCIUM 20 MG: 20 TABLET, FILM COATED ORAL at 09:15

## 2019-01-28 RX ADMIN — POTASSIUM CHLORIDE 20 MEQ: 1500 TABLET, EXTENDED RELEASE ORAL at 09:14

## 2019-01-28 RX ADMIN — METFORMIN HYDROCHLORIDE 500 MG: 500 TABLET ORAL at 08:14

## 2019-01-28 RX ADMIN — APIXABAN 5 MG: 5 TABLET, FILM COATED ORAL at 17:24

## 2019-01-29 ENCOUNTER — APPOINTMENT (OUTPATIENT)
Dept: CARDIOLOGY | Facility: HOSPITAL | Age: 74
End: 2019-01-29

## 2019-01-29 LAB
ANION GAP SERPL CALCULATED.3IONS-SCNC: 8.5 MMOL/L (ref 3.6–11.2)
BASOPHILS # BLD AUTO: 0.06 10*3/MM3 (ref 0–0.3)
BASOPHILS NFR BLD AUTO: 0.7 % (ref 0–2)
BH CV ECHO MEAS - % IVS THICK: 1 %
BH CV ECHO MEAS - % LVPW THICK: 64.3 %
BH CV ECHO MEAS - ACS: 2.4 CM
BH CV ECHO MEAS - AO ROOT AREA (BSA CORRECTED): 1.6
BH CV ECHO MEAS - AO ROOT AREA: 9.3 CM^2
BH CV ECHO MEAS - AO ROOT DIAM: 3.4 CM
BH CV ECHO MEAS - BSA(HAYCOCK): 2.3 M^2
BH CV ECHO MEAS - BSA: 2.2 M^2
BH CV ECHO MEAS - BZI_BMI: 31.9 KILOGRAMS/M^2
BH CV ECHO MEAS - BZI_METRIC_HEIGHT: 177.8 CM
BH CV ECHO MEAS - BZI_METRIC_WEIGHT: 100.7 KG
BH CV ECHO MEAS - EDV(CUBED): 151.5 ML
BH CV ECHO MEAS - EDV(MOD-SP4): 52 ML
BH CV ECHO MEAS - EDV(TEICH): 137.1 ML
BH CV ECHO MEAS - EF(CUBED): 67.8 %
BH CV ECHO MEAS - EF(MOD-SP4): 57.7 %
BH CV ECHO MEAS - EF(TEICH): 58.8 %
BH CV ECHO MEAS - ESV(CUBED): 48.8 ML
BH CV ECHO MEAS - ESV(MOD-SP4): 22 ML
BH CV ECHO MEAS - ESV(TEICH): 56.5 ML
BH CV ECHO MEAS - FS: 31.4 %
BH CV ECHO MEAS - IVS/LVPW: 0.96
BH CV ECHO MEAS - IVSD: 1.1 CM
BH CV ECHO MEAS - IVSS: 1.1 CM
BH CV ECHO MEAS - LA DIMENSION: 4.3 CM
BH CV ECHO MEAS - LA/AO: 1.3
BH CV ECHO MEAS - LV DIASTOLIC VOL/BSA (35-75): 23.8 ML/M^2
BH CV ECHO MEAS - LV MASS(C)D: 237.4 GRAMS
BH CV ECHO MEAS - LV MASS(C)DI: 108.8 GRAMS/M^2
BH CV ECHO MEAS - LV MASS(C)S: 205.6 GRAMS
BH CV ECHO MEAS - LV MASS(C)SI: 94.2 GRAMS/M^2
BH CV ECHO MEAS - LV SYSTOLIC VOL/BSA (12-30): 10.1 ML/M^2
BH CV ECHO MEAS - LVIDD: 5.3 CM
BH CV ECHO MEAS - LVIDS: 3.7 CM
BH CV ECHO MEAS - LVLD AP4: 6.6 CM
BH CV ECHO MEAS - LVLS AP4: 5.9 CM
BH CV ECHO MEAS - LVOT AREA (M): 2.5 CM^2
BH CV ECHO MEAS - LVOT AREA: 2.5 CM^2
BH CV ECHO MEAS - LVOT DIAM: 1.8 CM
BH CV ECHO MEAS - LVPWD: 1.1 CM
BH CV ECHO MEAS - LVPWS: 1.9 CM
BH CV ECHO MEAS - PA ACC SLOPE: 1198 CM/SEC^2
BH CV ECHO MEAS - PA ACC TIME: 0.1 SEC
BH CV ECHO MEAS - PA PR(ACCEL): 34.6 MMHG
BH CV ECHO MEAS - RVDD: 2.7 CM
BH CV ECHO MEAS - SI(CUBED): 47 ML/M^2
BH CV ECHO MEAS - SI(MOD-SP4): 13.7 ML/M^2
BH CV ECHO MEAS - SI(TEICH): 37 ML/M^2
BH CV ECHO MEAS - SV(CUBED): 102.6 ML
BH CV ECHO MEAS - SV(MOD-SP4): 30 ML
BH CV ECHO MEAS - SV(TEICH): 80.7 ML
BNP SERPL-MCNC: 264 PG/ML (ref 0–100)
BUN BLD-MCNC: 21 MG/DL (ref 7–21)
BUN/CREAT SERPL: 22.3 (ref 7–25)
CALCIUM SPEC-SCNC: 9.4 MG/DL (ref 7.7–10)
CHLORIDE SERPL-SCNC: 105 MMOL/L (ref 99–112)
CO2 SERPL-SCNC: 26.5 MMOL/L (ref 24.3–31.9)
CREAT BLD-MCNC: 0.94 MG/DL (ref 0.43–1.29)
CRP SERPL-MCNC: 1.2 MG/DL (ref 0–0.99)
DEPRECATED RDW RBC AUTO: 45 FL (ref 37–54)
EOSINOPHIL # BLD AUTO: 0.6 10*3/MM3 (ref 0–0.7)
EOSINOPHIL NFR BLD AUTO: 7.3 % (ref 0–7)
ERYTHROCYTE [DISTWIDTH] IN BLOOD BY AUTOMATED COUNT: 14.4 % (ref 11.5–14.5)
GFR SERPL CREATININE-BSD FRML MDRD: 59 ML/MIN/1.73
GLUCOSE BLD-MCNC: 96 MG/DL (ref 70–110)
GLUCOSE BLDC GLUCOMTR-MCNC: 143 MG/DL (ref 70–130)
GLUCOSE BLDC GLUCOMTR-MCNC: 154 MG/DL (ref 70–130)
GLUCOSE BLDC GLUCOMTR-MCNC: 175 MG/DL (ref 70–130)
GLUCOSE BLDC GLUCOMTR-MCNC: 99 MG/DL (ref 70–130)
HCT VFR BLD AUTO: 36.5 % (ref 37–47)
HGB BLD-MCNC: 12.2 G/DL (ref 12–16)
IMM GRANULOCYTES # BLD AUTO: 0.1 10*3/MM3 (ref 0–0.03)
IMM GRANULOCYTES NFR BLD AUTO: 1.2 % (ref 0–0.5)
LV EF 2D ECHO EST: 60 %
LYMPHOCYTES # BLD AUTO: 1.84 10*3/MM3 (ref 1–3)
LYMPHOCYTES NFR BLD AUTO: 22.4 % (ref 16–46)
MAXIMAL PREDICTED HEART RATE: 148 BPM
MCH RBC QN AUTO: 28 PG (ref 27–33)
MCHC RBC AUTO-ENTMCNC: 33.4 G/DL (ref 33–37)
MCV RBC AUTO: 83.9 FL (ref 80–94)
MONOCYTES # BLD AUTO: 0.98 10*3/MM3 (ref 0.1–0.9)
MONOCYTES NFR BLD AUTO: 11.9 % (ref 0–12)
NEUTROPHILS # BLD AUTO: 4.64 10*3/MM3 (ref 1.4–6.5)
NEUTROPHILS NFR BLD AUTO: 56.5 % (ref 40–75)
OSMOLALITY SERPL CALC.SUM OF ELEC: 282.2 MOSM/KG (ref 273–305)
PLATELET # BLD AUTO: 196 10*3/MM3 (ref 130–400)
PMV BLD AUTO: 11.1 FL (ref 6–10)
POTASSIUM BLD-SCNC: 3.8 MMOL/L (ref 3.5–5.3)
RBC # BLD AUTO: 4.35 10*6/MM3 (ref 4.2–5.4)
SODIUM BLD-SCNC: 140 MMOL/L (ref 135–153)
STRESS TARGET HR: 126 BPM
WBC NRBC COR # BLD: 8.22 10*3/MM3 (ref 4.5–12.5)

## 2019-01-29 PROCEDURE — 85025 COMPLETE CBC W/AUTO DIFF WBC: CPT | Performed by: PHYSICIAN ASSISTANT

## 2019-01-29 PROCEDURE — 86140 C-REACTIVE PROTEIN: CPT | Performed by: PHYSICIAN ASSISTANT

## 2019-01-29 PROCEDURE — 94799 UNLISTED PULMONARY SVC/PX: CPT

## 2019-01-29 PROCEDURE — 63710000001 INSULIN ASPART PER 5 UNITS: Performed by: PHYSICIAN ASSISTANT

## 2019-01-29 PROCEDURE — 80048 BASIC METABOLIC PNL TOTAL CA: CPT | Performed by: PHYSICIAN ASSISTANT

## 2019-01-29 PROCEDURE — 93306 TTE W/DOPPLER COMPLETE: CPT

## 2019-01-29 PROCEDURE — 93306 TTE W/DOPPLER COMPLETE: CPT | Performed by: INTERNAL MEDICINE

## 2019-01-29 PROCEDURE — 83880 ASSAY OF NATRIURETIC PEPTIDE: CPT | Performed by: PHYSICIAN ASSISTANT

## 2019-01-29 PROCEDURE — 82962 GLUCOSE BLOOD TEST: CPT

## 2019-01-29 PROCEDURE — 99223 1ST HOSP IP/OBS HIGH 75: CPT | Performed by: INTERNAL MEDICINE

## 2019-01-29 RX ORDER — CEFDINIR 300 MG/1
300 CAPSULE ORAL EVERY 12 HOURS SCHEDULED
Status: DISCONTINUED | OUTPATIENT
Start: 2019-01-29 | End: 2019-01-29

## 2019-01-29 RX ORDER — DEXTROSE MONOHYDRATE 25 G/50ML
25 INJECTION, SOLUTION INTRAVENOUS
Status: DISCONTINUED | OUTPATIENT
Start: 2019-01-29 | End: 2019-02-02 | Stop reason: HOSPADM

## 2019-01-29 RX ORDER — AZITHROMYCIN 250 MG/1
500 TABLET, FILM COATED ORAL
Status: DISCONTINUED | OUTPATIENT
Start: 2019-01-30 | End: 2019-02-02 | Stop reason: HOSPADM

## 2019-01-29 RX ORDER — NICOTINE POLACRILEX 4 MG
15 LOZENGE BUCCAL
Status: DISCONTINUED | OUTPATIENT
Start: 2019-01-29 | End: 2019-02-02 | Stop reason: HOSPADM

## 2019-01-29 RX ORDER — DOXYCYCLINE 100 MG/1
100 CAPSULE ORAL EVERY 12 HOURS SCHEDULED
Status: DISCONTINUED | OUTPATIENT
Start: 2019-01-29 | End: 2019-01-29

## 2019-01-29 RX ORDER — PREDNISONE 20 MG/1
40 TABLET ORAL
Status: DISCONTINUED | OUTPATIENT
Start: 2019-01-30 | End: 2019-02-01

## 2019-01-29 RX ORDER — ACETYLCYSTEINE 200 MG/ML
3 SOLUTION ORAL; RESPIRATORY (INHALATION) EVERY 12 HOURS SCHEDULED
Status: DISCONTINUED | OUTPATIENT
Start: 2019-01-29 | End: 2019-02-02 | Stop reason: HOSPADM

## 2019-01-29 RX ADMIN — APIXABAN 5 MG: 5 TABLET, FILM COATED ORAL at 17:52

## 2019-01-29 RX ADMIN — POTASSIUM CHLORIDE 20 MEQ: 1500 TABLET, EXTENDED RELEASE ORAL at 09:26

## 2019-01-29 RX ADMIN — PANTOPRAZOLE SODIUM 40 MG: 40 TABLET, DELAYED RELEASE ORAL at 05:04

## 2019-01-29 RX ADMIN — ATENOLOL 25 MG: 25 TABLET ORAL at 09:26

## 2019-01-29 RX ADMIN — SODIUM CHLORIDE, PRESERVATIVE FREE 3 ML: 5 INJECTION INTRAVENOUS at 20:01

## 2019-01-29 RX ADMIN — IPRATROPIUM BROMIDE AND ALBUTEROL SULFATE 3 ML: .5; 3 SOLUTION RESPIRATORY (INHALATION) at 18:35

## 2019-01-29 RX ADMIN — LISINOPRIL 5 MG: 2.5 TABLET ORAL at 09:26

## 2019-01-29 RX ADMIN — FUROSEMIDE 20 MG: 20 TABLET ORAL at 09:26

## 2019-01-29 RX ADMIN — BUDESONIDE AND FORMOTEROL FUMARATE DIHYDRATE 2 PUFF: 80; 4.5 AEROSOL RESPIRATORY (INHALATION) at 18:35

## 2019-01-29 RX ADMIN — BUDESONIDE AND FORMOTEROL FUMARATE DIHYDRATE 2 PUFF: 80; 4.5 AEROSOL RESPIRATORY (INHALATION) at 06:47

## 2019-01-29 RX ADMIN — SODIUM CHLORIDE, PRESERVATIVE FREE 3 ML: 5 INJECTION INTRAVENOUS at 09:25

## 2019-01-29 RX ADMIN — INSULIN ASPART 2 UNITS: 100 INJECTION, SOLUTION INTRAVENOUS; SUBCUTANEOUS at 17:52

## 2019-01-29 RX ADMIN — IPRATROPIUM BROMIDE AND ALBUTEROL SULFATE 3 ML: .5; 3 SOLUTION RESPIRATORY (INHALATION) at 13:54

## 2019-01-29 RX ADMIN — SODIUM CHLORIDE, PRESERVATIVE FREE 3 ML: 5 INJECTION INTRAVENOUS at 20:04

## 2019-01-29 RX ADMIN — ATORVASTATIN CALCIUM 20 MG: 20 TABLET, FILM COATED ORAL at 09:26

## 2019-01-29 RX ADMIN — APIXABAN 5 MG: 5 TABLET, FILM COATED ORAL at 05:04

## 2019-01-29 RX ADMIN — INSULIN ASPART 2 UNITS: 100 INJECTION, SOLUTION INTRAVENOUS; SUBCUTANEOUS at 20:04

## 2019-01-29 RX ADMIN — IPRATROPIUM BROMIDE AND ALBUTEROL SULFATE 3 ML: .5; 3 SOLUTION RESPIRATORY (INHALATION) at 05:10

## 2019-01-29 RX ADMIN — DOXYCYCLINE 100 MG: 100 INJECTION, POWDER, LYOPHILIZED, FOR SOLUTION INTRAVENOUS at 09:26

## 2019-01-30 ENCOUNTER — APPOINTMENT (OUTPATIENT)
Dept: ULTRASOUND IMAGING | Facility: HOSPITAL | Age: 74
End: 2019-01-30
Attending: INTERNAL MEDICINE

## 2019-01-30 LAB
ANION GAP SERPL CALCULATED.3IONS-SCNC: 12 MMOL/L (ref 3.6–11.2)
ANION GAP SERPL CALCULATED.3IONS-SCNC: 8.3 MMOL/L (ref 3.6–11.2)
BACTERIA SPEC AEROBE CULT: NORMAL
BACTERIA SPEC AEROBE CULT: NORMAL
BASOPHILS # BLD AUTO: 0.06 10*3/MM3 (ref 0–0.3)
BASOPHILS NFR BLD AUTO: 0.8 % (ref 0–2)
BNP SERPL-MCNC: 110 PG/ML (ref 0–100)
BUN BLD-MCNC: 20 MG/DL (ref 7–21)
BUN BLD-MCNC: 24 MG/DL (ref 7–21)
BUN/CREAT SERPL: 20.2 (ref 7–25)
BUN/CREAT SERPL: 23 (ref 7–25)
CALCIUM SPEC-SCNC: 9.6 MG/DL (ref 7.7–10)
CALCIUM SPEC-SCNC: 9.9 MG/DL (ref 7.7–10)
CHLORIDE SERPL-SCNC: 102 MMOL/L (ref 99–112)
CHLORIDE SERPL-SCNC: 106 MMOL/L (ref 99–112)
CO2 SERPL-SCNC: 24 MMOL/L (ref 24.3–31.9)
CO2 SERPL-SCNC: 25.7 MMOL/L (ref 24.3–31.9)
CREAT BLD-MCNC: 0.87 MG/DL (ref 0.43–1.29)
CREAT BLD-MCNC: 1.19 MG/DL (ref 0.43–1.29)
CRP SERPL-MCNC: 1.03 MG/DL (ref 0–0.99)
D DIMER PPP FEU-MCNC: <0.27 MCGFEU/ML (ref 0–0.5)
DEPRECATED RDW RBC AUTO: 44.2 FL (ref 37–54)
EOSINOPHIL # BLD AUTO: 0.63 10*3/MM3 (ref 0–0.7)
EOSINOPHIL NFR BLD AUTO: 8.1 % (ref 0–7)
ERYTHROCYTE [DISTWIDTH] IN BLOOD BY AUTOMATED COUNT: 14.4 % (ref 11.5–14.5)
GFR SERPL CREATININE-BSD FRML MDRD: 45 ML/MIN/1.73
GFR SERPL CREATININE-BSD FRML MDRD: 64 ML/MIN/1.73
GLUCOSE BLD-MCNC: 106 MG/DL (ref 70–110)
GLUCOSE BLD-MCNC: 261 MG/DL (ref 70–110)
GLUCOSE BLDC GLUCOMTR-MCNC: 118 MG/DL (ref 70–130)
GLUCOSE BLDC GLUCOMTR-MCNC: 130 MG/DL (ref 70–130)
GLUCOSE BLDC GLUCOMTR-MCNC: 205 MG/DL (ref 70–130)
GLUCOSE BLDC GLUCOMTR-MCNC: 249 MG/DL (ref 70–130)
HCT VFR BLD AUTO: 35.8 % (ref 37–47)
HGB BLD-MCNC: 11.5 G/DL (ref 12–16)
IMM GRANULOCYTES # BLD AUTO: 0.18 10*3/MM3 (ref 0–0.03)
IMM GRANULOCYTES NFR BLD AUTO: 2.3 % (ref 0–0.5)
LYMPHOCYTES # BLD AUTO: 2.07 10*3/MM3 (ref 1–3)
LYMPHOCYTES NFR BLD AUTO: 26.6 % (ref 16–46)
MCH RBC QN AUTO: 27.6 PG (ref 27–33)
MCHC RBC AUTO-ENTMCNC: 32.1 G/DL (ref 33–37)
MCV RBC AUTO: 85.9 FL (ref 80–94)
MONOCYTES # BLD AUTO: 0.67 10*3/MM3 (ref 0.1–0.9)
MONOCYTES NFR BLD AUTO: 8.6 % (ref 0–12)
NEUTROPHILS # BLD AUTO: 4.18 10*3/MM3 (ref 1.4–6.5)
NEUTROPHILS NFR BLD AUTO: 53.6 % (ref 40–75)
OSMOLALITY SERPL CALC.SUM OF ELEC: 282.4 MOSM/KG (ref 273–305)
OSMOLALITY SERPL CALC.SUM OF ELEC: 288.8 MOSM/KG (ref 273–305)
PLATELET # BLD AUTO: 207 10*3/MM3 (ref 130–400)
PMV BLD AUTO: 11.4 FL (ref 6–10)
POTASSIUM BLD-SCNC: 4.1 MMOL/L (ref 3.5–5.3)
POTASSIUM BLD-SCNC: 4.7 MMOL/L (ref 3.5–5.3)
RBC # BLD AUTO: 4.17 10*6/MM3 (ref 4.2–5.4)
SODIUM BLD-SCNC: 138 MMOL/L (ref 135–153)
SODIUM BLD-SCNC: 140 MMOL/L (ref 135–153)
WBC NRBC COR # BLD: 7.79 10*3/MM3 (ref 4.5–12.5)

## 2019-01-30 PROCEDURE — 83880 ASSAY OF NATRIURETIC PEPTIDE: CPT | Performed by: PHYSICIAN ASSISTANT

## 2019-01-30 PROCEDURE — 86140 C-REACTIVE PROTEIN: CPT | Performed by: PHYSICIAN ASSISTANT

## 2019-01-30 PROCEDURE — 63710000001 PREDNISONE PER 1 MG: Performed by: INTERNAL MEDICINE

## 2019-01-30 PROCEDURE — 94799 UNLISTED PULMONARY SVC/PX: CPT

## 2019-01-30 PROCEDURE — 99232 SBSQ HOSP IP/OBS MODERATE 35: CPT | Performed by: INTERNAL MEDICINE

## 2019-01-30 PROCEDURE — 80048 BASIC METABOLIC PNL TOTAL CA: CPT | Performed by: INTERNAL MEDICINE

## 2019-01-30 PROCEDURE — 63710000001 INSULIN ASPART PER 5 UNITS: Performed by: INTERNAL MEDICINE

## 2019-01-30 PROCEDURE — 93970 EXTREMITY STUDY: CPT

## 2019-01-30 PROCEDURE — 85379 FIBRIN DEGRADATION QUANT: CPT | Performed by: INTERNAL MEDICINE

## 2019-01-30 PROCEDURE — 85025 COMPLETE CBC W/AUTO DIFF WBC: CPT | Performed by: PHYSICIAN ASSISTANT

## 2019-01-30 PROCEDURE — 82962 GLUCOSE BLOOD TEST: CPT

## 2019-01-30 PROCEDURE — 93970 EXTREMITY STUDY: CPT | Performed by: RADIOLOGY

## 2019-01-30 PROCEDURE — 25010000002 FUROSEMIDE PER 20 MG: Performed by: INTERNAL MEDICINE

## 2019-01-30 RX ADMIN — APIXABAN 5 MG: 5 TABLET, FILM COATED ORAL at 17:00

## 2019-01-30 RX ADMIN — INSULIN ASPART 4 UNITS: 100 INJECTION, SOLUTION INTRAVENOUS; SUBCUTANEOUS at 11:10

## 2019-01-30 RX ADMIN — ATORVASTATIN CALCIUM 20 MG: 20 TABLET, FILM COATED ORAL at 08:52

## 2019-01-30 RX ADMIN — SODIUM CHLORIDE, PRESERVATIVE FREE 3 ML: 5 INJECTION INTRAVENOUS at 20:10

## 2019-01-30 RX ADMIN — PREDNISONE 40 MG: 20 TABLET ORAL at 08:51

## 2019-01-30 RX ADMIN — ACETYLCYSTEINE 3 ML: 200 SOLUTION ORAL; RESPIRATORY (INHALATION) at 06:45

## 2019-01-30 RX ADMIN — IPRATROPIUM BROMIDE 0.5 MG: 0.5 SOLUTION RESPIRATORY (INHALATION) at 06:45

## 2019-01-30 RX ADMIN — LISINOPRIL 5 MG: 2.5 TABLET ORAL at 08:51

## 2019-01-30 RX ADMIN — INSULIN ASPART 4 UNITS: 100 INJECTION, SOLUTION INTRAVENOUS; SUBCUTANEOUS at 17:00

## 2019-01-30 RX ADMIN — ACETYLCYSTEINE 3 ML: 200 SOLUTION ORAL; RESPIRATORY (INHALATION) at 18:31

## 2019-01-30 RX ADMIN — FUROSEMIDE 80 MG: 10 INJECTION, SOLUTION INTRAMUSCULAR; INTRAVENOUS at 08:51

## 2019-01-30 RX ADMIN — ACETYLCYSTEINE 3 ML: 200 SOLUTION ORAL; RESPIRATORY (INHALATION) at 00:00

## 2019-01-30 RX ADMIN — SODIUM CHLORIDE, PRESERVATIVE FREE 3 ML: 5 INJECTION INTRAVENOUS at 20:09

## 2019-01-30 RX ADMIN — POTASSIUM CHLORIDE 20 MEQ: 1500 TABLET, EXTENDED RELEASE ORAL at 08:51

## 2019-01-30 RX ADMIN — AZITHROMYCIN 500 MG: 250 TABLET, FILM COATED ORAL at 08:51

## 2019-01-30 RX ADMIN — SODIUM CHLORIDE, PRESERVATIVE FREE 3 ML: 5 INJECTION INTRAVENOUS at 08:52

## 2019-01-30 RX ADMIN — BUDESONIDE AND FORMOTEROL FUMARATE DIHYDRATE 2 PUFF: 80; 4.5 AEROSOL RESPIRATORY (INHALATION) at 18:31

## 2019-01-30 RX ADMIN — ATENOLOL 25 MG: 25 TABLET ORAL at 08:52

## 2019-01-30 RX ADMIN — PANTOPRAZOLE SODIUM 40 MG: 40 TABLET, DELAYED RELEASE ORAL at 06:02

## 2019-01-30 RX ADMIN — IPRATROPIUM BROMIDE 0.5 MG: 0.5 SOLUTION RESPIRATORY (INHALATION) at 00:00

## 2019-01-30 RX ADMIN — APIXABAN 5 MG: 5 TABLET, FILM COATED ORAL at 06:02

## 2019-01-30 RX ADMIN — BUDESONIDE AND FORMOTEROL FUMARATE DIHYDRATE 2 PUFF: 80; 4.5 AEROSOL RESPIRATORY (INHALATION) at 06:45

## 2019-01-30 RX ADMIN — IPRATROPIUM BROMIDE 0.5 MG: 0.5 SOLUTION RESPIRATORY (INHALATION) at 18:31

## 2019-01-31 LAB
ANION GAP SERPL CALCULATED.3IONS-SCNC: 6.1 MMOL/L (ref 3.6–11.2)
ANION GAP SERPL CALCULATED.3IONS-SCNC: 7.4 MMOL/L (ref 3.6–11.2)
BUN BLD-MCNC: 26 MG/DL (ref 7–21)
BUN BLD-MCNC: 29 MG/DL (ref 7–21)
BUN/CREAT SERPL: 28.6 (ref 7–25)
BUN/CREAT SERPL: 29.3 (ref 7–25)
CALCIUM SPEC-SCNC: 10.1 MG/DL (ref 7.7–10)
CALCIUM SPEC-SCNC: 9.4 MG/DL (ref 7.7–10)
CHLORIDE SERPL-SCNC: 102 MMOL/L (ref 99–112)
CHLORIDE SERPL-SCNC: 103 MMOL/L (ref 99–112)
CO2 SERPL-SCNC: 26.9 MMOL/L (ref 24.3–31.9)
CO2 SERPL-SCNC: 29.6 MMOL/L (ref 24.3–31.9)
CREAT BLD-MCNC: 0.91 MG/DL (ref 0.43–1.29)
CREAT BLD-MCNC: 0.99 MG/DL (ref 0.43–1.29)
GFR SERPL CREATININE-BSD FRML MDRD: 55 ML/MIN/1.73
GFR SERPL CREATININE-BSD FRML MDRD: 61 ML/MIN/1.73
GLUCOSE BLD-MCNC: 215 MG/DL (ref 70–110)
GLUCOSE BLD-MCNC: 99 MG/DL (ref 70–110)
GLUCOSE BLDC GLUCOMTR-MCNC: 103 MG/DL (ref 70–130)
GLUCOSE BLDC GLUCOMTR-MCNC: 142 MG/DL (ref 70–130)
GLUCOSE BLDC GLUCOMTR-MCNC: 148 MG/DL (ref 70–130)
GLUCOSE BLDC GLUCOMTR-MCNC: 181 MG/DL (ref 70–130)
OSMOLALITY SERPL CALC.SUM OF ELEC: 282.4 MOSM/KG (ref 273–305)
OSMOLALITY SERPL CALC.SUM OF ELEC: 284.2 MOSM/KG (ref 273–305)
POTASSIUM BLD-SCNC: 3.8 MMOL/L (ref 3.5–5.3)
POTASSIUM BLD-SCNC: 4.7 MMOL/L (ref 3.5–5.3)
SODIUM BLD-SCNC: 135 MMOL/L (ref 135–153)
SODIUM BLD-SCNC: 140 MMOL/L (ref 135–153)

## 2019-01-31 PROCEDURE — 25010000002 FUROSEMIDE PER 20 MG: Performed by: INTERNAL MEDICINE

## 2019-01-31 PROCEDURE — 94799 UNLISTED PULMONARY SVC/PX: CPT

## 2019-01-31 PROCEDURE — 63710000001 PREDNISONE PER 1 MG: Performed by: INTERNAL MEDICINE

## 2019-01-31 PROCEDURE — 99232 SBSQ HOSP IP/OBS MODERATE 35: CPT | Performed by: INTERNAL MEDICINE

## 2019-01-31 PROCEDURE — 82962 GLUCOSE BLOOD TEST: CPT

## 2019-01-31 PROCEDURE — 63710000001 INSULIN ASPART PER 5 UNITS: Performed by: INTERNAL MEDICINE

## 2019-01-31 PROCEDURE — 80048 BASIC METABOLIC PNL TOTAL CA: CPT | Performed by: INTERNAL MEDICINE

## 2019-01-31 PROCEDURE — 80048 BASIC METABOLIC PNL TOTAL CA: CPT | Performed by: NURSE PRACTITIONER

## 2019-01-31 RX ORDER — FUROSEMIDE 10 MG/ML
40 INJECTION INTRAMUSCULAR; INTRAVENOUS ONCE
Status: COMPLETED | OUTPATIENT
Start: 2019-01-31 | End: 2019-01-31

## 2019-01-31 RX ADMIN — APIXABAN 5 MG: 5 TABLET, FILM COATED ORAL at 06:08

## 2019-01-31 RX ADMIN — AZITHROMYCIN 500 MG: 250 TABLET, FILM COATED ORAL at 09:55

## 2019-01-31 RX ADMIN — SODIUM CHLORIDE, PRESERVATIVE FREE 3 ML: 5 INJECTION INTRAVENOUS at 21:15

## 2019-01-31 RX ADMIN — SODIUM CHLORIDE, PRESERVATIVE FREE 3 ML: 5 INJECTION INTRAVENOUS at 09:58

## 2019-01-31 RX ADMIN — IPRATROPIUM BROMIDE 0.5 MG: 0.5 SOLUTION RESPIRATORY (INHALATION) at 19:12

## 2019-01-31 RX ADMIN — ATORVASTATIN CALCIUM 20 MG: 20 TABLET, FILM COATED ORAL at 09:56

## 2019-01-31 RX ADMIN — PREDNISONE 40 MG: 20 TABLET ORAL at 09:54

## 2019-01-31 RX ADMIN — METFORMIN HYDROCHLORIDE 500 MG: 500 TABLET ORAL at 17:40

## 2019-01-31 RX ADMIN — APIXABAN 5 MG: 5 TABLET, FILM COATED ORAL at 17:40

## 2019-01-31 RX ADMIN — ACETYLCYSTEINE 3 ML: 200 SOLUTION ORAL; RESPIRATORY (INHALATION) at 06:45

## 2019-01-31 RX ADMIN — INSULIN ASPART 2 UNITS: 100 INJECTION, SOLUTION INTRAVENOUS; SUBCUTANEOUS at 17:38

## 2019-01-31 RX ADMIN — PANTOPRAZOLE SODIUM 40 MG: 40 TABLET, DELAYED RELEASE ORAL at 06:08

## 2019-01-31 RX ADMIN — POTASSIUM CHLORIDE 20 MEQ: 1500 TABLET, EXTENDED RELEASE ORAL at 09:55

## 2019-01-31 RX ADMIN — SODIUM CHLORIDE, PRESERVATIVE FREE 3 ML: 5 INJECTION INTRAVENOUS at 09:59

## 2019-01-31 RX ADMIN — IPRATROPIUM BROMIDE 0.5 MG: 0.5 SOLUTION RESPIRATORY (INHALATION) at 00:40

## 2019-01-31 RX ADMIN — FUROSEMIDE 40 MG: 10 INJECTION, SOLUTION INTRAMUSCULAR; INTRAVENOUS at 21:13

## 2019-01-31 RX ADMIN — IPRATROPIUM BROMIDE 0.5 MG: 0.5 SOLUTION RESPIRATORY (INHALATION) at 06:45

## 2019-01-31 RX ADMIN — BUDESONIDE AND FORMOTEROL FUMARATE DIHYDRATE 2 PUFF: 80; 4.5 AEROSOL RESPIRATORY (INHALATION) at 19:12

## 2019-01-31 RX ADMIN — BUDESONIDE AND FORMOTEROL FUMARATE DIHYDRATE 2 PUFF: 80; 4.5 AEROSOL RESPIRATORY (INHALATION) at 06:45

## 2019-01-31 RX ADMIN — IPRATROPIUM BROMIDE 0.5 MG: 0.5 SOLUTION RESPIRATORY (INHALATION) at 12:45

## 2019-01-31 RX ADMIN — ACETYLCYSTEINE 3 ML: 200 SOLUTION ORAL; RESPIRATORY (INHALATION) at 19:12

## 2019-02-01 ENCOUNTER — APPOINTMENT (OUTPATIENT)
Dept: GENERAL RADIOLOGY | Facility: HOSPITAL | Age: 74
End: 2019-02-01

## 2019-02-01 LAB
BNP SERPL-MCNC: 29 PG/ML (ref 0–100)
CRP SERPL-MCNC: <0.5 MG/DL (ref 0–0.99)
GLUCOSE BLDC GLUCOMTR-MCNC: 104 MG/DL (ref 70–130)
GLUCOSE BLDC GLUCOMTR-MCNC: 161 MG/DL (ref 70–130)
GLUCOSE BLDC GLUCOMTR-MCNC: 183 MG/DL (ref 70–130)
GLUCOSE BLDC GLUCOMTR-MCNC: 184 MG/DL (ref 70–130)

## 2019-02-01 PROCEDURE — 94799 UNLISTED PULMONARY SVC/PX: CPT

## 2019-02-01 PROCEDURE — 70220 X-RAY EXAM OF SINUSES: CPT | Performed by: RADIOLOGY

## 2019-02-01 PROCEDURE — 70220 X-RAY EXAM OF SINUSES: CPT

## 2019-02-01 PROCEDURE — 25010000002 FUROSEMIDE PER 20 MG: Performed by: NURSE PRACTITIONER

## 2019-02-01 PROCEDURE — 63710000001 PREDNISONE PER 1 MG: Performed by: INTERNAL MEDICINE

## 2019-02-01 PROCEDURE — 82962 GLUCOSE BLOOD TEST: CPT

## 2019-02-01 PROCEDURE — 63710000001 INSULIN ASPART PER 5 UNITS: Performed by: INTERNAL MEDICINE

## 2019-02-01 PROCEDURE — 83880 ASSAY OF NATRIURETIC PEPTIDE: CPT | Performed by: NURSE PRACTITIONER

## 2019-02-01 PROCEDURE — 86140 C-REACTIVE PROTEIN: CPT | Performed by: NURSE PRACTITIONER

## 2019-02-01 PROCEDURE — 71046 X-RAY EXAM CHEST 2 VIEWS: CPT

## 2019-02-01 PROCEDURE — 71046 X-RAY EXAM CHEST 2 VIEWS: CPT | Performed by: RADIOLOGY

## 2019-02-01 PROCEDURE — 99232 SBSQ HOSP IP/OBS MODERATE 35: CPT | Performed by: INTERNAL MEDICINE

## 2019-02-01 RX ORDER — PREDNISONE 20 MG/1
20 TABLET ORAL
Status: DISCONTINUED | OUTPATIENT
Start: 2019-02-02 | End: 2019-02-01

## 2019-02-01 RX ORDER — PREDNISONE 20 MG/1
20 TABLET ORAL
Status: DISCONTINUED | OUTPATIENT
Start: 2019-02-02 | End: 2019-02-02 | Stop reason: HOSPADM

## 2019-02-01 RX ORDER — FUROSEMIDE 10 MG/ML
40 INJECTION INTRAMUSCULAR; INTRAVENOUS ONCE
Status: COMPLETED | OUTPATIENT
Start: 2019-02-01 | End: 2019-02-01

## 2019-02-01 RX ADMIN — BUDESONIDE AND FORMOTEROL FUMARATE DIHYDRATE 2 PUFF: 80; 4.5 AEROSOL RESPIRATORY (INHALATION) at 20:03

## 2019-02-01 RX ADMIN — IPRATROPIUM BROMIDE 0.5 MG: 0.5 SOLUTION RESPIRATORY (INHALATION) at 06:51

## 2019-02-01 RX ADMIN — SODIUM CHLORIDE, PRESERVATIVE FREE 3 ML: 5 INJECTION INTRAVENOUS at 08:39

## 2019-02-01 RX ADMIN — ACETYLCYSTEINE 3 ML: 200 SOLUTION ORAL; RESPIRATORY (INHALATION) at 20:02

## 2019-02-01 RX ADMIN — AZITHROMYCIN 500 MG: 250 TABLET, FILM COATED ORAL at 08:35

## 2019-02-01 RX ADMIN — METFORMIN HYDROCHLORIDE 500 MG: 500 TABLET ORAL at 17:08

## 2019-02-01 RX ADMIN — ACETAMINOPHEN 650 MG: 325 TABLET ORAL at 05:45

## 2019-02-01 RX ADMIN — APIXABAN 5 MG: 5 TABLET, FILM COATED ORAL at 17:08

## 2019-02-01 RX ADMIN — METFORMIN HYDROCHLORIDE 500 MG: 500 TABLET ORAL at 08:36

## 2019-02-01 RX ADMIN — APIXABAN 5 MG: 5 TABLET, FILM COATED ORAL at 05:42

## 2019-02-01 RX ADMIN — PANTOPRAZOLE SODIUM 40 MG: 40 TABLET, DELAYED RELEASE ORAL at 05:42

## 2019-02-01 RX ADMIN — IPRATROPIUM BROMIDE 0.5 MG: 0.5 SOLUTION RESPIRATORY (INHALATION) at 00:42

## 2019-02-01 RX ADMIN — FUROSEMIDE 40 MG: 10 INJECTION, SOLUTION INTRAMUSCULAR; INTRAVENOUS at 14:43

## 2019-02-01 RX ADMIN — IPRATROPIUM BROMIDE 0.5 MG: 0.5 SOLUTION RESPIRATORY (INHALATION) at 12:48

## 2019-02-01 RX ADMIN — ATENOLOL 25 MG: 25 TABLET ORAL at 08:37

## 2019-02-01 RX ADMIN — IPRATROPIUM BROMIDE 0.5 MG: 0.5 SOLUTION RESPIRATORY (INHALATION) at 20:02

## 2019-02-01 RX ADMIN — INSULIN ASPART 2 UNITS: 100 INJECTION, SOLUTION INTRAVENOUS; SUBCUTANEOUS at 20:52

## 2019-02-01 RX ADMIN — SODIUM CHLORIDE, PRESERVATIVE FREE 3 ML: 5 INJECTION INTRAVENOUS at 20:52

## 2019-02-01 RX ADMIN — PREDNISONE 40 MG: 20 TABLET ORAL at 08:36

## 2019-02-01 RX ADMIN — INSULIN ASPART 2 UNITS: 100 INJECTION, SOLUTION INTRAVENOUS; SUBCUTANEOUS at 17:06

## 2019-02-01 RX ADMIN — INSULIN ASPART 2 UNITS: 100 INJECTION, SOLUTION INTRAVENOUS; SUBCUTANEOUS at 11:54

## 2019-02-01 RX ADMIN — POTASSIUM CHLORIDE 20 MEQ: 1500 TABLET, EXTENDED RELEASE ORAL at 08:35

## 2019-02-01 RX ADMIN — ATORVASTATIN CALCIUM 20 MG: 20 TABLET, FILM COATED ORAL at 08:35

## 2019-02-01 RX ADMIN — BUDESONIDE AND FORMOTEROL FUMARATE DIHYDRATE 2 PUFF: 80; 4.5 AEROSOL RESPIRATORY (INHALATION) at 07:03

## 2019-02-01 RX ADMIN — ACETYLCYSTEINE 3 ML: 200 SOLUTION ORAL; RESPIRATORY (INHALATION) at 06:51

## 2019-02-01 NOTE — PROGRESS NOTES
Discharge Planning Assessment   [facility]     Patient Name: Mayra Hays  MRN: [id number]  Today's Date: 2/1/2019    Admit Date: 1/25/2019      Discharge Plan     Row Name 02/01/19 1405       Plan    Plan SS received consult discharge planning. patient is ready for discharge from medical standpoint and dishcarge planning needs, home situation?? SS spoke to pt and pt states plans to return home at discharge. SS discussed pt with Tanja MCCALL and she states plans to f/u with Dr. Ruiz and discharge pt on Saturday, 2/2. SS to follow.         Unique Joyner

## 2019-02-01 NOTE — PLAN OF CARE
Problem: Patient Care Overview  Goal: Interprofessional Rounds/Family Conf  Outcome: Ongoing (interventions implemented as appropriate)      Problem: Diabetes, Type 2 (Adult)  Goal: Signs and Symptoms of Listed Potential Problems Will be Absent, Minimized or Managed (Diabetes, Type 2)  Outcome: Ongoing (interventions implemented as appropriate)      Problem: Cardiac Output Decreased (Adult)  Goal: Identify Related Risk Factors and Signs and Symptoms  Outcome: Ongoing (interventions implemented as appropriate)    Goal: Effective Tissue Perfusion  Outcome: Ongoing (interventions implemented as appropriate)      Problem: Infection, Risk/Actual (Adult)  Goal: Identify Related Risk Factors and Signs and Symptoms  Outcome: Ongoing (interventions implemented as appropriate)    Goal: Infection Prevention/Resolution  Outcome: Ongoing (interventions implemented as appropriate)      Problem: Fall Risk (Adult)  Goal: Identify Related Risk Factors and Signs and Symptoms  Outcome: Ongoing (interventions implemented as appropriate)    Goal: Absence of Fall  Outcome: Ongoing (interventions implemented as appropriate)

## 2019-02-01 NOTE — PROGRESS NOTES
LOS: 5 days     Chief Complaint:  Pulmonology is following for Pneumonia    Subjective     Interval History: Ms. Hays is sitting on the side of the bed.  She states that she is still having some shortness of breath but that her breathing is improving.  She states that it is worse with exertion.  No acute events reported overnight.  She's currently on room air and saturating 96%.    History taken from: patient chart RN    Review of Systems:   Review of Systems - History obtained from chart review and the patient  General ROS: negative for - chills, fatigue or fever  Psychological ROS: negative for - anxiety or depression  ENT ROS: negative for - headaches, visual changes or vocal changes  Allergy and Immunology ROS: negative for - nasal congestion, postnasal drip or seasonal allergies  Endocrine ROS: negative for - polydipsia/polyuria  Respiratory ROS: Her shortness of breath, negative for wheezing.  Cardiovascular ROS: no chest pain positive for dyspnea on exertion  Gastrointestinal ROS: no abdominal pain, change in bowel habits, or black or bloody stools  Musculoskeletal ROS: negative for - joint pain, joint stiffness, joint swelling or muscle pain  Neurological ROS: no TIA or stroke symptoms                    Objective     Vital Signs  Temp:  [97.5 °F (36.4 °C)-98.3 °F (36.8 °C)] 97.5 °F (36.4 °C)  Heart Rate:  [57-93] 57  Resp:  [18-20] 20  BP: (115-142)/(51-68) 129/66  Body mass index is 34.47 kg/m².    Intake/Output Summary (Last 24 hours) at 2/1/2019 1008  Last data filed at 2/1/2019 0852  Gross per 24 hour   Intake 1260 ml   Output --   Net 1260 ml     I/O this shift:  In: 600 [P.O.:600]  Out: -     Physical Exam:  GENERAL APPEARANCE: Well developed, well nourished, alert and cooperative, and appears to be in no acute distress.    HEAD: normocephalic.  Atraumatic    EYES: PERRL, EOMI. Fundi normal, vision is grossly intact.    THROAT: Oral cavity and pharynx normal. No inflammation, swelling, exudate, or  lesions.     NECK: Neck supple. No thyromegaly.    CARDIAC: Normal S1 and S2. No S3, S4 or murmurs. Rhythm is regular. There is no peripheral edema, cyanosis or pallor. Extremities are warm and well perfused. Capillary refill is less than 2 seconds. No carotid bruits.    RESPIRATORY: Bilateral air entry positive. Diminished breath sounds at the lung bases.  No wheezing, rhonchi, or crackles upon auscultation.      GI: Positive bowel sounds. Soft, nondistended, nontender.     MUSCULOSKELETAL: No significant deformity or joint abnormality. No edema. Peripheral pulses intact. No varicosities.    NEUROLOGICAL: Strength and sensation symmetric and intact throughout.     PSYCHIATRIC: The mental examination revealed the patient was oriented to person, place, and time.                 Results Review:                I reviewed the patient's new clinical results.  I reviewed the patient's new imaging results and agree with the interpretation.  Results from last 7 days   Lab Units 01/30/19  0445 01/29/19  0705 01/28/19  0525   WBC 10*3/mm3 7.79 8.22 10.57   HEMOGLOBIN g/dL 11.5* 12.2 10.7*   PLATELETS 10*3/mm3 207 196 206     Results from last 7 days   Lab Units 01/31/19  1421 01/31/19  0750 01/30/19  1727   SODIUM mmol/L 135 140 138   POTASSIUM mmol/L 4.7 3.8 4.7   CHLORIDE mmol/L 102 103 102   CO2 mmol/L 26.9 29.6 24.0*   BUN mg/dL 29* 26* 24*   CREATININE mg/dL 0.99 0.91 1.19   CALCIUM mg/dL 9.4 10.1* 9.9   GLUCOSE mg/dL 215* 99 261*     Lab Results   Component Value Date    INR 1.02 06/01/2017    INR 1.03 05/31/2017    INR 0.98 05/31/2017    PROTIME 11.3 06/01/2017    PROTIME 11.5 05/31/2017    PROTIME 10.9 05/31/2017     Results from last 7 days   Lab Units 01/26/19  0500 01/25/19  1545   ALK PHOS U/L 105* 135*   BILIRUBIN mg/dL 0.2 0.5   ALT (SGPT) U/L 25 28   AST (SGOT) U/L 17 20     Results from last 7 days   Lab Units 01/26/19  0414   PH, ARTERIAL pH units 7.313*   PO2 ART mm Hg 63.1*   PCO2, ARTERIAL mm Hg 38.7    HCO3 ART mmol/L 19.2*     Imaging Results (last 24 hours)     ** No results found for the last 24 hours. **             Medication Review:   Scheduled Medications:    acetylcysteine 3 mL Nebulization Q12H   apixaban 5 mg Oral Q12H   atenolol 25 mg Oral Daily   atorvastatin 20 mg Oral Daily   azithromycin 500 mg Oral Q24H   budesonide-formoterol 2 puff Inhalation BID - RT   insulin aspart 0-9 Units Subcutaneous 4x Daily AC & at Bedtime   ipratropium 0.5 mg Nebulization 4x Daily - RT   lisinopril 5 mg Oral Daily   metFORMIN 500 mg Oral BID With Meals   pantoprazole 40 mg Oral QAM   potassium chloride 20 mEq Oral Daily   [START ON 2/2/2019] predniSONE 20 mg Oral Daily With Breakfast   sodium chloride 3 mL Intravenous Q12H   sodium chloride 3 mL Intravenous Q12H     Continuous infusions:       Assessment/Plan                    ERASTO Mckeon  02/01/19  10:08 AM      I have seen and examined the patient personally and performed a face-to-face diagnostic evaluation. The plan of care was reviewed and developed with APRN and nursing staff. I have addended and/or modified the above history of present illness, physical examination, and assessment/plan to reflect my findings and impressions. Essential elements of the care plan were discussed with APRN above.  I agree with the findings and assessment/plan as documented below.    Lab Results   Component Value Date    PHART 7.313 (L) 01/26/2019    XMK6VTB 38.7 01/26/2019    PO2ART 63.1 (L) 01/26/2019    NQW8REM 19.2 (C) 01/26/2019    BASEEXCESS -6.5 01/26/2019    A5DXPWIC 90.9 01/26/2019     US Venous Doppler Lower Extremity Bilateral (duplex)  Narrative: US VENOUS DOPPLER LOWER EXTREMITY BILATERAL (DUPLEX)-     REASON FOR EXAM:  r/o DVT; J18.1-Lobar pneumonia, unspecified organism;  J44.1-Chronic obstructive pulmonary disease with (acute) exacerbation     Multiple real-time images were obtained. The deep veins were well  demonstrated sonographically. There is  good color doppler signal seen  filling the deep veins. They were completely compressed by the  ultrasound transducer. There was good spontaneous venous flow and  augmentation. There are no echoes seen along the deep veins to suggest  clot.        Impression: No sonographic findings of DVT in the lower extremities     This report was finalized on 1/30/2019 2:38 PM by Dr. Jose Manuel Ruiz II, MD.       Lab Results   Component Value Date    WBC 7.79 01/30/2019    HGB 11.5 (L) 01/30/2019    HCT 35.8 (L) 01/30/2019    MCV 85.9 01/30/2019     01/30/2019     Lab Results   Component Value Date    GLUCOSE 215 (H) 01/31/2019    CALCIUM 9.4 01/31/2019     01/31/2019    K 4.7 01/31/2019    CO2 26.9 01/31/2019     01/31/2019    BUN 29 (H) 01/31/2019    CREATININE 0.99 01/31/2019    EGFRIFNONA 55 (L) 01/31/2019    BCR 29.3 (H) 01/31/2019    ANIONGAP 6.1 01/31/2019         I have reviewed the labs.      Assessment:  Acute exacerbation of COPD  Acute pulmonary edema  Bilateral basal atelectasis  Physical deconditioning  Former smoker     Plan:  - Continue Mucomyst nebs   - Continue prednisone 40 mg daily x 5 days.  No tapered required.  - Continue Insulin correction sliding scale.   - Continue with PPI while on steroid.  - c/w symbicort nebs   - started on atrovent. Nebs  -  avoid albuterol nebs in the setting of atrial fibrillation  - Aggressive PTOT while in hospital  - Incentive spirometer to her basal atelectasis  - Continue azithromycin for COPD exacerbation.  Or duration 5 days.  - Currently on apixaban.       We will sign off.  Please call us in case of any other questions.    Nurse and respiratory therapist were updated about the clinical plan.  Thank you for involving us in the care of the patient.    Curtis Ruiz M.D  Pulmonary and Critical Care Medicine

## 2019-02-01 NOTE — PROGRESS NOTES
PROGRESS NOTE         Patient Identification:  Name:  Mayra Hays  Age:  73 y.o.  Sex:  female  :  1945  MRN:  [id number]  Visit Number:  [id number]  Primary Care Provider:  Elena Garcia APRN      ----------------------------------------------------------------------------------------------------------------------  Subjective       Chief Complaints:    Abnormal Lab and Syncope        Interval History:      CRP level and white count are both normal. VSS, no fever reported overnight. Cultures and Radiologic studies  Have been normal. She reports she still feels short of breath.     Review of Systems:    Constitutional: no fever, chills and night sweats. No appetite change or unexpected weight change. No fatigue.  Eyes: no eye drainage, itching or redness.  HEENT: no mouth sores, dysphagia or nose bleed.  Respiratory: Postive SOA , no cough or production of sputum.  Cardiovascular: no chest pain, no palpitations, no orthopnea.  Gastrointestinal: no nausea, vomiting or diarrhea. No abdominal pain, hematemesis or rectal bleeding.  Genitourinary: no dysuria or polyuria.  Hematologic/lymphatic: no lymph node abnormalities, no easy bruising or easy bleeding.  Musculoskeletal: no muscle or joint pain.  Skin: No rash and no itching.  Neurological: no loss of consciousness, no seizure, no headache.  Behavioral/Psych: no depression or suicidal ideation.  Endocrine: no hot flashes.  Immunologic: negative.  ----------------------------------------------------------------------------------------------------------------------      Objective       Current Hospital Meds:    acetylcysteine 3 mL Nebulization Q12H   apixaban 5 mg Oral Q12H   atenolol 25 mg Oral Daily   atorvastatin 20 mg Oral Daily   azithromycin 500 mg Oral Q24H   budesonide-formoterol 2 puff Inhalation BID - RT   furosemide 40 mg Intravenous Once   insulin aspart 0-9 Units Subcutaneous 4x Daily AC & at Bedtime   ipratropium 0.5 mg  Nebulization 4x Daily - RT   lisinopril 5 mg Oral Daily   metFORMIN 500 mg Oral BID With Meals   pantoprazole 40 mg Oral QAM   potassium chloride 20 mEq Oral Daily   [START ON 2/2/2019] predniSONE 20 mg Oral Daily With Breakfast   sodium chloride 3 mL Intravenous Q12H   sodium chloride 3 mL Intravenous Q12H        ----------------------------------------------------------------------------------------------------------------------    Vital Signs:  Temp:  [97.5 °F (36.4 °C)-98.3 °F (36.8 °C)] 97.9 °F (36.6 °C)  Heart Rate:  [] 63  Resp:  [16-20] 18  BP: (114-142)/(51-87) 114/87  No data found.  SpO2 Percentage    02/01/19 1100 02/01/19 1248 02/01/19 1257   SpO2: 97% 93% 96%     SpO2:  [93 %-98 %] 96 %  on   ;   Device (Oxygen Therapy): room air    Body mass index is 34.47 kg/m².  Wt Readings from Last 3 Encounters:   02/01/19 109 kg (240 lb 3.2 oz)   01/04/19 109 kg (240 lb)   12/12/18 100 kg (220 lb 7.4 oz)        Intake/Output Summary (Last 24 hours) at 2/1/2019 1348  Last data filed at 2/1/2019 1314  Gross per 24 hour   Intake 1380 ml   Output --   Net 1380 ml     Diet Regular; Consistent Carbohydrate  ----------------------------------------------------------------------------------------------------------------------    Physical exam:    Constitutional:  Well-developed and well-nourished.  No respiratory distress.      HENT:  Head:  Normocephalic and atraumatic.  Mouth:  Moist mucous membranes.    Eyes:  Conjunctivae and EOM are normal.  No scleral icterus.    Neck:  Neck supple.  No JVD present.    Cardiovascular:  Normal rate, regular rhythm and normal heart sounds with no murmur. No edema.  Pulmonary/Chest: Bilateral rhonchi.  No respiratory distress, no wheezes, no crackles, with normal breath sounds and good air movement.  Abdominal:  Soft.  Bowel sounds are normal.  No distension and no tenderness.   Musculoskeletal:  No edema, no tenderness, and no deformity.  No red or swollen joints anywhere.     Neurological:  Alert and oriented to person, place, and time. No facial droop.  No slurred speech.   Skin:  Skin is warm and dry. No rash noted. No pallor.     ----------------------------------------------------------------------------------------------------------------------  I have personally reviewed the EKG/Telemetry strips   ----------------------------------------------------------------------------------------------------------------------  Results from last 7 days   Lab Units 01/27/19  1342 01/26/19  0500 01/25/19  1545   TROPONIN I ng/mL <0.006 <0.006 <0.006         Results from last 7 days   Lab Units 01/26/19  0414   PH, ARTERIAL pH units 7.313*   PO2 ART mm Hg 63.1*   PCO2, ARTERIAL mm Hg 38.7   HCO3 ART mmol/L 19.2*     Results from last 7 days   Lab Units 02/01/19  0406 01/30/19  0445 01/29/19  0705 01/28/19  0525 01/27/19  0809 01/26/19  1256   CRP mg/dL <0.50 1.03* 1.20* <0.50 0.68  --    LACTATE mmol/L  --   --   --  1.2 2.2* 2.4*   WBC 10*3/mm3  --  7.79 8.22 10.57 11.90  --    HEMOGLOBIN g/dL  --  11.5* 12.2 10.7* 10.6*  --    HEMATOCRIT %  --  35.8* 36.5* 33.0* 33.2*  --    MCV fL  --  85.9 83.9 86.2 85.6  --    MCHC g/dL  --  32.1* 33.4 32.4* 31.9*  --    PLATELETS 10*3/mm3  --  207 196 206 189  --      Results from last 7 days   Lab Units 01/31/19  1421 01/31/19  0750 01/30/19  1727  01/26/19  0500 01/25/19  1545   SODIUM mmol/L 135 140 138   < > 136 140   POTASSIUM mmol/L 4.7 3.8 4.7   < > 4.0 3.8   CHLORIDE mmol/L 102 103 102   < > 110 106   CO2 mmol/L 26.9 29.6 24.0*   < > 19.2* 26.5   BUN mg/dL 29* 26* 24*   < > 23* 22*   CREATININE mg/dL 0.99 0.91 1.19   < > 1.24 1.29   EGFR IF NONAFRICN AM mL/min/1.73 55* 61 45*   < > 43* 41*   CALCIUM mg/dL 9.4 10.1* 9.9   < > 8.4 9.3   GLUCOSE mg/dL 215* 99 261*   < > 290* 110   ALBUMIN g/dL  --   --   --   --  3.50 4.40   BILIRUBIN mg/dL  --   --   --   --  0.2 0.5   ALK PHOS U/L  --   --   --   --  105* 135*   AST (SGOT) U/L  --   --   --   --  17  20   ALT (SGPT) U/L  --   --   --   --  25 28    < > = values in this interval not displayed.   Estimated Creatinine Clearance: 68.7 mL/min (by C-G formula based on SCr of 0.99 mg/dL).  No results found for: AMMONIA    Glucose   Date/Time Value Ref Range Status   02/01/2019 1146 183 (H) 70 - 130 mg/dL Final   02/01/2019 0635 104 70 - 130 mg/dL Final   01/31/2019 2047 148 (H) 70 - 130 mg/dL Final   01/31/2019 1648 181 (H) 70 - 130 mg/dL Final   01/31/2019 1127 142 (H) 70 - 130 mg/dL Final   01/31/2019 0658 103 70 - 130 mg/dL Final   01/30/2019 2007 130 70 - 130 mg/dL Final   01/30/2019 1635 249 (H) 70 - 130 mg/dL Final     Lab Results   Component Value Date    HGBA1C 6.80 (H) 12/13/2018     Lab Results   Component Value Date    TSH 3.310 12/13/2018    FREET4 1.88 (H) 05/31/2017       Blood Culture   Date Value Ref Range Status   01/25/2019 No growth at 24 hours  Preliminary   01/25/2019 No growth at 24 hours  Preliminary                 Pain Management Panel     Pain Management Panel Latest Ref Rng & Units 5/30/2017    AMPHETAMINES SCREEN, URINE Negative Negative    BARBITURATES SCREEN Negative Negative    BENZODIAZEPINE SCREEN, URINE Negative Negative    BUPRENORPHINE Negative Negative    COCAINE SCREEN, URINE Negative Negative    METHADONE SCREEN, URINE Negative Negative          I have personally reviewed the above laboratory results.   ----------------------------------------------------------------------------------------------------------------------  Imaging Results (last 24 hours)     Procedure Component Value Units Date/Time    XR Sinuses 3+ View [[id number]] Updated:  02/01/19 1030    XR Chest PA & Lateral [[id number]] Updated:  02/01/19 1029        I have personally reviewed the above radiology results.   ----------------------------------------------------------------------------------------------------------------------    Assessment/Plan       Assessment/Plan     ASSESSMENT:    1. Severe sepsis with  lactic acid >2  2. COPD exacerbation vs pneumonia   3. UTI     PLAN:     CRP level and white count are both normal. VSS, no fever reported overnight. Cultures and Radiologic studies  Have been normal. She reports she still feels short of breath.      At this time SOA and rhonchi is not believed to be infectious in nature.    Antibiotic coverage was changed to Azithromycin by pulmonary team.     was  consulted for disharge planning needs.    Code Status:   Code Status and Medical Interventions:   Ordered at: 01/25/19 1947     Code Status:    CPR     Medical Interventions (Level of Support Prior to Arrest):    Full     Scribed for Dr. Ruslan Contreras, ERASTO  02/01/19  1:48 PM     Physician Attestation:    The documentation recorded by the scribe accurately reflects the service I personally performed and the decisions made by me.    James Mercer MD  Infectious Diseases  02/02/19  12:14 PM

## 2019-02-01 NOTE — PLAN OF CARE
Problem: Cardiac Output Decreased (Adult)  Goal: Effective Tissue Perfusion  Outcome: Ongoing (interventions implemented as appropriate)   02/01/19 0031   Cardiac Output Decreased (Adult)   Effective Tissue Perfusion making progress toward outcome       Problem: Infection, Risk/Actual (Adult)  Goal: Infection Prevention/Resolution  Outcome: Ongoing (interventions implemented as appropriate)   02/01/19 0031   Infection, Risk/Actual (Adult)   Infection Prevention/Resolution making progress toward outcome       Problem: Fall Risk (Adult)  Goal: Absence of Fall  Outcome: Ongoing (interventions implemented as appropriate)   02/01/19 0031   Fall Risk (Adult)   Absence of Fall making progress toward outcome

## 2019-02-02 VITALS
WEIGHT: 240.2 LBS | RESPIRATION RATE: 20 BRPM | TEMPERATURE: 98 F | HEIGHT: 70 IN | BODY MASS INDEX: 34.39 KG/M2 | SYSTOLIC BLOOD PRESSURE: 106 MMHG | HEART RATE: 80 BPM | DIASTOLIC BLOOD PRESSURE: 59 MMHG | OXYGEN SATURATION: 96 %

## 2019-02-02 LAB
CRP SERPL-MCNC: <0.5 MG/DL (ref 0–0.99)
GLUCOSE BLDC GLUCOMTR-MCNC: 116 MG/DL (ref 70–130)
GLUCOSE BLDC GLUCOMTR-MCNC: 180 MG/DL (ref 70–130)

## 2019-02-02 PROCEDURE — 63710000001 INSULIN ASPART PER 5 UNITS: Performed by: INTERNAL MEDICINE

## 2019-02-02 PROCEDURE — 86140 C-REACTIVE PROTEIN: CPT | Performed by: NURSE PRACTITIONER

## 2019-02-02 PROCEDURE — 94799 UNLISTED PULMONARY SVC/PX: CPT

## 2019-02-02 PROCEDURE — 82962 GLUCOSE BLOOD TEST: CPT

## 2019-02-02 PROCEDURE — 63710000001 PREDNISONE PER 1 MG: Performed by: INTERNAL MEDICINE

## 2019-02-02 RX ORDER — PREDNISONE 20 MG/1
20 TABLET ORAL
Qty: 5 TABLET | Refills: 0 | Status: ON HOLD | OUTPATIENT
Start: 2019-02-03 | End: 2019-12-19

## 2019-02-02 RX ORDER — DOXYCYCLINE HYCLATE 100 MG
100 TABLET ORAL EVERY 12 HOURS
Qty: 20 TABLET | Refills: 0 | Status: SHIPPED | OUTPATIENT
Start: 2019-02-02 | End: 2019-02-07

## 2019-02-02 RX ADMIN — METFORMIN HYDROCHLORIDE 500 MG: 500 TABLET ORAL at 08:33

## 2019-02-02 RX ADMIN — INSULIN ASPART 2 UNITS: 100 INJECTION, SOLUTION INTRAVENOUS; SUBCUTANEOUS at 11:21

## 2019-02-02 RX ADMIN — ATORVASTATIN CALCIUM 20 MG: 20 TABLET, FILM COATED ORAL at 08:33

## 2019-02-02 RX ADMIN — PANTOPRAZOLE SODIUM 40 MG: 40 TABLET, DELAYED RELEASE ORAL at 05:33

## 2019-02-02 RX ADMIN — IPRATROPIUM BROMIDE 0.5 MG: 0.5 SOLUTION RESPIRATORY (INHALATION) at 06:56

## 2019-02-02 RX ADMIN — POTASSIUM CHLORIDE 20 MEQ: 1500 TABLET, EXTENDED RELEASE ORAL at 08:33

## 2019-02-02 RX ADMIN — PREDNISONE 20 MG: 20 TABLET ORAL at 08:33

## 2019-02-02 RX ADMIN — LISINOPRIL 5 MG: 2.5 TABLET ORAL at 08:33

## 2019-02-02 RX ADMIN — IPRATROPIUM BROMIDE 0.5 MG: 0.5 SOLUTION RESPIRATORY (INHALATION) at 00:57

## 2019-02-02 RX ADMIN — ACETAMINOPHEN 650 MG: 325 TABLET ORAL at 05:35

## 2019-02-02 RX ADMIN — AZITHROMYCIN 500 MG: 250 TABLET, FILM COATED ORAL at 08:33

## 2019-02-02 RX ADMIN — ATENOLOL 25 MG: 25 TABLET ORAL at 08:33

## 2019-02-02 RX ADMIN — ACETYLCYSTEINE 3 ML: 200 SOLUTION ORAL; RESPIRATORY (INHALATION) at 06:56

## 2019-02-02 RX ADMIN — APIXABAN 5 MG: 5 TABLET, FILM COATED ORAL at 05:33

## 2019-02-02 RX ADMIN — IPRATROPIUM BROMIDE 0.5 MG: 0.5 SOLUTION RESPIRATORY (INHALATION) at 12:38

## 2019-02-02 RX ADMIN — BUDESONIDE AND FORMOTEROL FUMARATE DIHYDRATE 2 PUFF: 80; 4.5 AEROSOL RESPIRATORY (INHALATION) at 06:56

## 2019-02-02 NOTE — PLAN OF CARE
Problem: Patient Care Overview  Goal: Plan of Care Review  Outcome: Ongoing (interventions implemented as appropriate)   02/02/19 0970   Plan of Care Review   Progress no change   Coping/Psychosocial   Plan of Care Reviewed With patient       Problem: Diabetes, Type 2 (Adult)  Goal: Signs and Symptoms of Listed Potential Problems Will be Absent, Minimized or Managed (Diabetes, Type 2)  Outcome: Ongoing (interventions implemented as appropriate)      Problem: Cardiac Output Decreased (Adult)  Goal: Identify Related Risk Factors and Signs and Symptoms  Outcome: Ongoing (interventions implemented as appropriate)    Goal: Effective Tissue Perfusion  Outcome: Ongoing (interventions implemented as appropriate)      Problem: Infection, Risk/Actual (Adult)  Goal: Identify Related Risk Factors and Signs and Symptoms  Outcome: Ongoing (interventions implemented as appropriate)    Goal: Infection Prevention/Resolution  Outcome: Ongoing (interventions implemented as appropriate)      Problem: Fall Risk (Adult)  Goal: Identify Related Risk Factors and Signs and Symptoms  Outcome: Ongoing (interventions implemented as appropriate)    Goal: Absence of Fall  Outcome: Ongoing (interventions implemented as appropriate)

## 2019-02-02 NOTE — DISCHARGE SUMMARY
DISCHARGE SUMMARY        Patient Identification:  Name:  Mayra Hays  Age:  73 y.o.  Sex:  female  :  1945  MRN:  [id number]  Visit Number:  [id number]    Date of Admission: 2019  Date of Discharge:      PCP: Elena Garcia APRN    Discharging Provider: ERASTO Conte      Discharge Diagnoses       Pneumonia resolved    Consults/Procedures     Consults:   Consults     Date and Time Order Name Status Description    2019 0948 Inpatient Pulmonology Consult Completed           Procedures Performed:         History of Presenting Illness     Patient is a 73 y.o. female presented to [facility] complaining of right rib pain, shortness of air, and productive cough  Please see the admitting history and physical for further details.      Hospital Course     The patient is a 73 y.o. Female with history of CAD, COPD, DM, HTN, and GERD who presents to the ED for left sided rib discomfort. She has a cough with yellow sputum. Symptoms have been on going for 2 days. She denies any SOA, nausea, vomiting, fever, chills, or diarrhea.      While at the ED patient had negative troponin with lactic acid of 1.1. WBC normal. UA is positive for UTI. Influenza antigen was negative.      She was admitted to the Observation Unit for further evaluation. Rocephin and Doxycycline continued with Duo-nebs and methylprednisolone.  SCDs initiated for DVT prophylaxis.    In the setting of significant clinical and laboratory improvement, with normal white count, CRP level, CXR and absence of fever, patient was discharged home.    Recommend follow up appointment with PCP in 1-2 weeks.    Discharged with Doxycycline 100 mg by mouth twice a day,  and Prednisone 20 mg 1 tab by mouth daily for 5 days.      Consult follow up as patient would like a walker with wheels for home.    Discharge Vitals/Physical Examination     Vital Signs:  Temp:  [97.9 °F (36.6 °C)-98.5 °F (36.9 °C)] 98 °F  (36.7 °C)  Heart Rate:  [] 73  Resp:  [18-20] 18  BP: (106-150)/(57-90) 106/59  No data found.  SpO2 Percentage    02/02/19 0057 02/02/19 0105 02/02/19 0656   SpO2: 97% 99% 95%     SpO2:  [93 %-99 %] 95 %  on  Flow (L/min):  [2] 2;   Device (Oxygen Therapy): room air    Body mass index is 34.47 kg/m².  Wt Readings from Last 3 Encounters:   02/01/19 109 kg (240 lb 3.2 oz)   01/04/19 109 kg (240 lb)   12/12/18 100 kg (220 lb 7.4 oz)         Physical Exam:    Constitutional:  Well-developed and well-nourished.  No respiratory distress.      HENT:  Head: Normocephalic and atraumatic.  Mouth:  Moist mucous membranes.    Eyes:  Conjunctivae and EOM are normal.  No scleral icterus.  Neck:  Neck supple.  No JVD present.    Cardiovascular:  Normal rate, regular rhythm and normal heart sounds with no murmur. No edema.  Pulmonary/Chest:  No respiratory distress, no wheezes, no crackles, with normal breath sounds and good air movement.  Abdominal:  Soft.  Bowel sounds are normal.  No distension and no tenderness.   Musculoskeletal:  No edema, no tenderness, and no deformity.  No swelling or redness of joints.  Neurological:  Alert and oriented to person, place, and time.  No facial droop.  No slurred speech.   Skin:  Skin is warm and dry.  No rash noted.  No pallor.   Psychiatric:  Normal mood and affect.  Behavior is normal.      Pertinent Laboratory/Radiology Results     Pertinent Laboratory Results:    Results from last 7 days   Lab Units 01/27/19  1342   TROPONIN I ng/mL <0.006           Results from last 7 days   Lab Units 02/02/19  0737 02/01/19  0406 01/30/19  0445 01/29/19  0705 01/28/19  0525 01/27/19  0809  01/26/19  1256   CRP mg/dL <0.50 <0.50 1.03* 1.20* <0.50 0.68   < >  --    LACTATE mmol/L  --   --   --   --  1.2 2.2*  --  2.4*   WBC 10*3/mm3  --   --  7.79 8.22 10.57 11.90   < >  --    HEMOGLOBIN g/dL  --   --  11.5* 12.2 10.7* 10.6*   < >  --    HEMATOCRIT %  --   --  35.8* 36.5* 33.0* 33.2*   < >  --     MCV fL  --   --  85.9 83.9 86.2 85.6   < >  --    MCHC g/dL  --   --  32.1* 33.4 32.4* 31.9*   < >  --    PLATELETS 10*3/mm3  --   --  207 196 206 189   < >  --     < > = values in this interval not displayed.     Results from last 7 days   Lab Units 01/31/19  1421 01/31/19  0750 01/30/19  1727   SODIUM mmol/L 135 140 138   POTASSIUM mmol/L 4.7 3.8 4.7   CHLORIDE mmol/L 102 103 102   CO2 mmol/L 26.9 29.6 24.0*   BUN mg/dL 29* 26* 24*   CREATININE mg/dL 0.99 0.91 1.19   EGFR IF NONAFRICN AM mL/min/1.73 55* 61 45*   CALCIUM mg/dL 9.4 10.1* 9.9   GLUCOSE mg/dL 215* 99 261*   Estimated Creatinine Clearance: 68.7 mL/min (by C-G formula based on SCr of 0.99 mg/dL).  No results found for: AMMONIA    Glucose   Date/Time Value Ref Range Status   02/02/2019 1115 180 (H) 70 - 130 mg/dL Final   02/02/2019 0637 116 70 - 130 mg/dL Final   02/01/2019 2030 161 (H) 70 - 130 mg/dL Final   02/01/2019 1654 184 (H) 70 - 130 mg/dL Final   02/01/2019 1146 183 (H) 70 - 130 mg/dL Final   02/01/2019 0635 104 70 - 130 mg/dL Final   01/31/2019 2047 148 (H) 70 - 130 mg/dL Final   01/31/2019 1648 181 (H) 70 - 130 mg/dL Final     Lab Results   Component Value Date    HGBA1C 6.80 (H) 12/13/2018     Lab Results   Component Value Date    TSH 3.310 12/13/2018    FREET4 1.88 (H) 05/31/2017                      Pain Management Panel     Pain Management Panel Latest Ref Rng & Units 5/30/2017    AMPHETAMINES SCREEN, URINE Negative Negative    BARBITURATES SCREEN Negative Negative    BENZODIAZEPINE SCREEN, URINE Negative Negative    BUPRENORPHINE Negative Negative    COCAINE SCREEN, URINE Negative Negative    METHADONE SCREEN, URINE Negative Negative          Pertinent Radiology Results:  Imaging Results (all)     Procedure Component Value Units Date/Time    XR Sinuses 3+ View [[id number]] Updated:  02/01/19 1030    XR Chest PA & Lateral [[id number]] Updated:  02/01/19 1029    US Venous Doppler Lower Extremity Bilateral (duplex) [[id number]] Collected:   01/30/19 1438     Updated:  01/30/19 1440    Narrative:       US VENOUS DOPPLER LOWER EXTREMITY BILATERAL (DUPLEX)-     REASON FOR EXAM:  r/o DVT; J18.1-Lobar pneumonia, unspecified organism;  J44.1-Chronic obstructive pulmonary disease with (acute) exacerbation     Multiple real-time images were obtained. The deep veins were well  demonstrated sonographically. There is good color doppler signal seen  filling the deep veins. They were completely compressed by the  ultrasound transducer. There was good spontaneous venous flow and  augmentation. There are no echoes seen along the deep veins to suggest  clot.          Impression:       No sonographic findings of DVT in the lower extremities     This report was finalized on 1/30/2019 2:38 PM by Dr. Jose Manuel Ruiz II, MD.       CT Chest With Contrast [[id number]] Collected:  01/29/19 0750     Updated:  01/29/19 0759    Narrative:       CT CHEST W CONTRAST-        TECHNIQUE: Multiple axial CT images were obtained from lung apex through  upper abdomen with administration of IV contrast. Reformatted images in  the coronal and/or sagittal plane(s) were generated from the axial data  set to facilitate diagnostic accuracy and/or surgical planning.  Oral Contrast:NONE.     Radiation dose reduction techniques were utilized per ALARA protocol.  Automated exposure control was initiated through either or CareDose or  DoseRight software packages by  protocol.          870.66 mGy.cm  Clinical information  dysnea; J18.1-Lobar pneumonia, unspecified organism; J44.1-Chronic  obstructive pulmonary disease with (acute) exacerbation      Comparison  NONE.     Findings  LUNGS: Unremarkable. No parenchymal soft tissue nodules.  No focal air  space disease.     HEART: Unremarkable.     PERICARDIUM: No effusion.     MEDIASTINUM: No masses. No enlarged lymph nodes.  No fluid collections.     PLEURA: No pleural effusion. No pleural mass or abnormal calcification.     MAJOR AIRWAYS:  Clear. No intrinsic mass.     VASCULATURE: No evidence of aneurysm.     VISUALIZED UPPER ABDOMEN:        LIVER: Homogeneous. No focal hepatic mass or ductal dilatation.        SPLEEN: Homogeneous. No splenomegaly.        ADRENALS: No mass.        KIDNEYS: No mass. No obstructive uropathy.  No evidence of  urolithiasis.        GI TRACT: Non-dilated. No definite wall thickening.        PERITONEUM: No free air. No free fluid or loculated fluid  collections.           ABDOMINAL WALL: No focal hernia or mass.           OTHER: None.     BONES: No acute bony abnormality.       Impression:       Impression:  1. Unremarkable exam.  No source for the patient symptoms.  2. Other incidental/non-acute findings as above.     This report was finalized on 1/29/2019 7:57 AM by Dr. Bill Parham MD.       XR Chest 2 View [[id number]] Collected:  01/26/19 0848     Updated:  01/26/19 0851    Narrative:       EXAMINATION: XR CHEST 2 VW-      CLINICAL INDICATION:     cough, CP     TECHNIQUE:  XR CHEST 2 VW-      COMPARISON: 01/04/2019      FINDINGS:        Interstitial thickening is developed.   Cardiomegaly is noted.   No pneumothorax.   No effusions.   Stable appearance of the bony structures.            Impression:       Development of CHF.     This report was finalized on 1/26/2019 8:49 AM by Dr. Kunal Castanon MD.             Test Results Pending at Discharge:      Discharge Disposition/Discharge Medications/Discharge Appointments     Discharge Disposition:   Home or Self Care    Condition at Discharge:  Stable, much improved with no issues today.    Code Status While Inpatient:  Code Status and Medical Interventions:   Ordered at: 01/25/19 1947     Code Status:    CPR     Medical Interventions (Level of Support Prior to Arrest):    Full       Discharge Medications:     Discharge Medications      Continue These Medications      Instructions Start Date   albuterol sulfate  (90 Base) MCG/ACT inhaler  Commonly known as:   PROVENTIL HFA;VENTOLIN HFA;PROAIR HFA   2 puffs, Inhalation, 2 Times Daily      apixaban 5 MG tablet tablet  Commonly known as:  ELIQUIS   5 mg, Oral, 2 Times Daily      atenolol 25 MG tablet  Commonly known as:  TENORMIN   25 mg, Oral, Daily      atorvastatin 20 MG tablet  Commonly known as:  LIPITOR   20 mg, Oral, Daily      BREO ELLIPTA 100-25 MCG/INH inhaler  Generic drug:  Fluticasone Furoate-Vilanterol   1 puff, Inhalation, Daily - RT      bumetanide 1 MG tablet  Commonly known as:  BUMEX   1 mg, Oral, Daily      furosemide 20 MG tablet  Commonly known as:  LASIX   20 mg, Oral, Daily      lisinopril 5 MG tablet  Commonly known as:  PRINIVIL,ZESTRIL   5 mg, Oral, Daily      metFORMIN 500 MG tablet  Commonly known as:  GLUCOPHAGE   500 mg, Oral, 2 Times Daily With Meals      pantoprazole 40 MG EC tablet  Commonly known as:  PROTONIX   40 mg, Oral, Daily      potassium chloride 20 MEQ CR tablet  Commonly known as:  K-DUR,KLOR-CON   20 mEq, Oral, 2 Times Daily             Discharge Diet:    As previously tolerated    Discharge Activity:    As previously tolerated    Discharge Appointments:  Your Scheduled Appointments    Feb 28, 2019 12:20 PM EST  Follow Up with Darek Nelson PA-C  [facility] (--) [address]  [facility] [address] [phone]   Arrive 15 minutes prior to appointment.   Mar 26, 2019  2:20 PM EDT  Follow Up with ERASTO Mckeon  [facility] (--) [address]  [facility] [id number] [phone]   Arrive 15 minutes prior to appointment.          Follow-up Information     Elena Garcia APRN Follow up in 1 week(s).    Specialty:  Family Medicine  Contact information:  [address]  [facility] [id number] [phone]                           ERASTO Conte  02/02/19  11:36 AM          Please note that this discharge summary required more than 30 minutes to complete.

## 2019-02-02 NOTE — PLAN OF CARE
Problem: Patient Care Overview  Goal: Plan of Care Review  Outcome: Ongoing (interventions implemented as appropriate)   01/30/19 1324 02/01/19 2100   Plan of Care Review   Progress improving --    Coping/Psychosocial   Plan of Care Reviewed With --  patient     Goal: Individualization and Mutuality  Outcome: Ongoing (interventions implemented as appropriate)    Goal: Discharge Needs Assessment  Outcome: Ongoing (interventions implemented as appropriate)    Goal: Interprofessional Rounds/Family Conf  Outcome: Ongoing (interventions implemented as appropriate)      Problem: Diabetes, Type 2 (Adult)  Goal: Signs and Symptoms of Listed Potential Problems Will be Absent, Minimized or Managed (Diabetes, Type 2)  Outcome: Ongoing (interventions implemented as appropriate)   01/31/19 1049 02/01/19 1116   Goal/Outcome Evaluation   Problems Assessed (Type 2 Diabetes) --  hyperglycemia   Problems Present (Type 2 Diabetes) hyperglycemia;situational response --        Problem: Cardiac Output Decreased (Adult)  Goal: Identify Related Risk Factors and Signs and Symptoms  Outcome: Ongoing (interventions implemented as appropriate)   01/30/19 1324 02/01/19 1116   Cardiac Output Decreased (Adult)   Related Risk Factors (Cardiac Output Decreased) heart failure --    Signs and Symptoms (Cardiac Output Decreased) --  fatigue;shortness of breath     Goal: Effective Tissue Perfusion  Outcome: Ongoing (interventions implemented as appropriate)      Problem: Infection, Risk/Actual (Adult)  Goal: Identify Related Risk Factors and Signs and Symptoms  Outcome: Ongoing (interventions implemented as appropriate)   01/31/19 1049 02/01/19 1116   Infection, Risk/Actual (Adult)   Related Risk Factors (Infection, Risk/Actual) age extremes;sleep disturbance --    Signs and Symptoms (Infection, Risk/Actual) --  edema;weakness     Goal: Infection Prevention/Resolution  Outcome: Ongoing (interventions implemented as appropriate)      Problem: Fall Risk  (Adult)  Goal: Identify Related Risk Factors and Signs and Symptoms  Outcome: Ongoing (interventions implemented as appropriate)   01/31/19 1049 02/01/19 1116   Fall Risk (Adult)   Related Risk Factors (Fall Risk) --  gait/mobility problems   Signs and Symptoms (Fall Risk) presence of risk factors --      Goal: Absence of Fall  Outcome: Ongoing (interventions implemented as appropriate)

## 2019-02-02 NOTE — DISCHARGE PLACEMENT REQUEST
"Charles Archer (73 y.o. Female)     Date of Birth Social Security Number Address Home Phone MRN    1945  [address] [address] [phone] [id number]    Mandaen Marital Status          None Single       Admission Date Admission Type Admitting Provider Attending Provider Department, Room/Bed    1/25/19 Emergency James Mercer MD [place], Wajdi Samir, MD [address], 3347/2S    Discharge Date Discharge Disposition Discharge Destination         Home or Self Care              Attending Provider:  James Mercer MD    Allergies:  No Known Allergies    Isolation:  None   Infection:  None   Code Status:  CPR    Ht:  177.8 cm (70\")   Wt:  109 kg (240 lb 3.2 oz)    Admission Cmt:  None   Principal Problem:  None                Active Insurance as of 1/25/2019     Primary Coverage     Payor Plan Insurance Group Employer/Plan Group    MEDICARE MEDICARE A & B      Payor Plan Address Payor Plan Phone Number Payor Plan Fax Number Effective Dates    [address] [phone]  7/1/2011 - None Entered    [facility] [id number]       Subscriber Name Subscriber Birth Date Member ID       CHARLES ARCHER 1945 [id number]           Secondary Coverage     Payor Plan Insurance Group Employer/Plan Group    [facility] [place] [facility] [place]      Payor Plan Address Payor Plan Phone Number Payor Plan Fax Number Effective Dates    [address] [address]   1/1/2014 - None Entered    [place] [address]       Subscriber Name Subscriber Birth Date Member ID       CHARLES ARCHER 1945 [id number]                 Emergency Contacts      (Rel.) Home Phone Work Phone Mobile Phone    Hailey Friend (Daughter) [phone] -- --    SHANICEPHIL (Son) -- -- [phone]        [address] [address]  Phone:  [phone]  Fax:          Patient:     Charles Archer MRN:  [id number]   [address] " [address] :  1945  SSN:    Phone: [phone] Sex:  F      INSURANCE PAYOR PLAN GROUP # SUBSCRIBER ID   Primary:  Secondary:    MEDICARE  [facility] [id number]  [id number]      [id number]  [id number]   Admitting Diagnosis: Pneumonia of right lower lobe due to infectious organism (CMS/HCC) [J18.1]  Order Date:  2019         Case Management  Consult       (Order ID: [id number])     Diagnosis:         Priority:  Routine Expected Date:   Expiration Date:        Interval:   Count:    Reason for Consult? patient wants a walker with wheels.     Specimen Type:   Specimen Source:   Specimen Taken Date:   Specimen Taken Time:                   Authorizing Provider:Tanja Contreras APRN  Authorizing Provider's NPI: [id number]  Order Entered By: Tanja Contreras APRN 2019 11:40 AM     Electronically signed by: Tanja Contreras APRN 2019 11:40 AM                 History & Physical      Juan Contreras PA-C at 2019 11:20 AM                   HISTORY AND PHYSICAL        Patient Identification:  Name:  Mayra Hays  Age:  73 y.o.  Sex:  female  :  1945  MRN:  [id number]   Visit Number:  [id number]  Primary Care Physician:  Elena Garcia APRN       Subjective     Subjective     Chief complaint:     Chief Complaint   Patient presents with   • Abnormal Lab   • Syncope       History of presenting illness:     The patient is a 73 y.o. Female with history of CAD, COPD, DM, HTN, and GERD who presents to the ED for left sided rib discomfort. She has a cough with yellow sputum. Symptoms have been on going for 2 days. She denies any SOA, nausea, vomiting, fever, chills, or diarrhea.     While at the ED patient had negative troponin with lactic acid of 1.1. WBC normal. UA is positive for UTI. Influenza antigen was negative.     She was admitted to the Observation Unit for further evaluation. Rocephin and Doxycycline continued with Duo-nebs and methylprednisolone.   SCDs initiated for DVT prophylaxis.     Today patient continues to have symptoms. Nursing staff reports no changes overnight. Afebrile with no diarrhea. Troponin continues to be negative. Lactic acid elevated to 3.0 overnight, currently at 2.5. CRP of 2.66 with normal WBC. Blood cultures showing no current growth. Chest radiograph reveals development of CHF.    ---------------------------------------------------------------------------------------------------------------------     Review Of Systems:    Constitutional: no fever, chills and night sweats. No appetite change or unexpected weight change. No fatigue.  Eyes: no eye drainage, itching or redness.  HEENT: no mouth sores, dysphagia or nose bleed.  Respiratory: Cough with yellow sputum. no for shortness of breath,   Cardiovascular: no chest pain, no palpitations, no orthopnea.  Gastrointestinal: no nausea, vomiting or diarrhea. No abdominal pain, hematemesis or rectal bleeding.  Genitourinary: no dysuria or polyuria.  Hematologic/lymphatic: no lymph node abnormalities, no easy bruising or easy bleeding.  Musculoskeletal: Left sided rib pain. no muscle or joint pain.  Skin: No rash and no itching.  Neurological: no loss of consciousness, no seizure, no headache.  Behavioral/Psych: no depression or suicidal ideation.  Endocrine: no hot flashes.  Immunologic: negative.    ---------------------------------------------------------------------------------------------------------------------     Past Medical History    Past Medical History:   Diagnosis Date   • Collapsed lung    • COPD (chronic obstructive pulmonary disease) (CMS/HCC)    • Diabetes mellitus (CMS/HCC)    • GERD (gastroesophageal reflux disease)    • Hyperlipidemia    • Hypertension    • Myocardial infarction (CMS/HCC)    • Stroke (CMS/HCC)        Past Surgical History    Past Surgical History:   Procedure Laterality Date   • CARDIAC CATHETERIZATION N/A 8/22/2017    Procedure: Left Heart Cath;   Surgeon: Jatinder Matias MD;  Location: [facility] INVASIVE LOCATION;  Service:    • GALLBLADDER SURGERY         Family History    History reviewed. No pertinent family history.    Social History    Social History     Tobacco Use   • Smoking status: Former Smoker     Packs/day: 3.00     Types: Cigarettes     Last attempt to quit: 2015     Years since quittin.0   • Smokeless tobacco: Never Used   Substance Use Topics   • Alcohol use: No   • Drug use: No       Allergies    Patient has no known allergies.  ---------------------------------------------------------------------------------------------------------------------     Home Medications:    Prior to Admission Medications     Prescriptions Last Dose Informant Patient Reported? Taking?    albuterol 108 (90 Base) MCG/ACT inhaler 2019 Pharmacy Yes Yes    Inhale 2 puffs 2 (Two) Times a Day.    apixaban (ELIQUIS) 5 MG tablet tablet 2019 Pharmacy Yes Yes    Take 5 mg by mouth 2 (Two) Times a Day.    atenolol (TENORMIN) 25 MG tablet 2019 Pharmacy Yes Yes    Take 25 mg by mouth Daily.    atorvastatin (LIPITOR) 20 MG tablet 2019 Pharmacy Yes Yes    Take 20 mg by mouth Daily.    bumetanide (BUMEX) 1 MG tablet 2019 Pharmacy Yes Yes    Take 1 mg by mouth Daily.    Fluticasone Furoate-Vilanterol (BREO ELLIPTA) 100-25 MCG/INH inhaler 2019 Pharmacy Yes Yes    Inhale 1 puff Daily.    furosemide (LASIX) 20 MG tablet 2019 Pharmacy Yes Yes    Take 20 mg by mouth Daily.    lisinopril (PRINIVIL,ZESTRIL) 5 MG tablet 2019 Pharmacy Yes Yes    Take 5 mg by mouth Daily.    metFORMIN (GLUCOPHAGE) 500 MG tablet 2019 Pharmacy Yes Yes    Take 500 mg by mouth 2 (Two) Times a Day With Meals.    pantoprazole (PROTONIX) 40 MG EC tablet Past Week Pharmacy Yes Yes    Take 40 mg by mouth Daily.    potassium chloride (K-DUR,KLOR-CON) 20 MEQ CR tablet 2019 Pharmacy Yes Yes    Take 20 mEq by mouth 2 (Two) Times a Day.         ---------------------------------------------------------------------------------------------------------------------    Objective     Objective     Hospital Scheduled Meds:    ceftriaxone 2 g Intravenous Q24H   doxycycline 100 mg Intravenous Q12H   insulin aspart 0-7 Units Subcutaneous 4x Daily AC & at Bedtime   methylPREDNISolone sodium succinate 30 mg Intravenous Q12H   sodium chloride 3 mL Intravenous Q12H   sodium chloride 3 mL Intravenous Q12H       sodium chloride 100 mL/hr Last Rate: 100 mL/hr (01/26/19 1025)     ---------------------------------------------------------------------------------------------------------------------   Vital Signs:  Temp:  [97.3 °F (36.3 °C)-98.2 °F (36.8 °C)] 97.5 °F (36.4 °C)  Heart Rate:  [55-89] 55  Resp:  [18-20] 18  BP: ()/(31-63) 110/50  Mean Arterial Pressure (Non-Invasive) for the past 24 hrs (Last 3 readings):   Noninvasive MAP (mmHg)   01/26/19 0400 83   01/26/19 0000 62   01/25/19 2024 78     SpO2 Percentage    01/26/19 0000 01/26/19 0400 01/26/19 0800   SpO2: 98% 98% 96%     SpO2:  [90 %-100 %] 96 %  on  Flow (L/min):  [2] 2;   Device (Oxygen Therapy): nasal cannula    Body mass index is 31.25 kg/m².  Wt Readings from Last 3 Encounters:   01/25/19 98.8 kg (217 lb 12.8 oz)   01/04/19 109 kg (240 lb)   12/12/18 100 kg (220 lb 7.4 oz)     ---------------------------------------------------------------------------------------------------------------------     Physical Exam:    Constitutional:  Well-developed and well-nourished.  No respiratory distress.      HENT:  Head: Normocephalic and atraumatic.  Mouth:  Moist mucous membranes.    Eyes:  Conjunctivae and EOM are normal.  No scleral icterus.  Neck:  Neck supple.  No JVD present.    Cardiovascular:  Normal rate, regular rhythm and normal heart sounds with no murmur. No edema.  Pulmonary/Chest:  No respiratory distress, no wheezes, no crackles, with normal breath sounds and good air movement.  Abdominal:   Soft.  Bowel sounds are normal.  No distension and no tenderness.   Musculoskeletal:  No edema, no tenderness, and no deformity.  No swelling or redness of joints.  Neurological:  Alert and oriented to person, place, and time.  No facial droop.  No slurred speech.   Skin:  Skin is warm and dry.  No rash noted.  No pallor.   Psychiatric:  Normal mood and affect.  Behavior is normal.    ---------------------------------------------------------------------------------------------------------------------  I have personally reviewed the EKG/Telemetry strip  ---------------------------------------------------------------------------------------------------------------------   Results from last 7 days   Lab Units 01/26/19  0500 01/25/19  1545   TROPONIN I ng/mL <0.006 <0.006         Results from last 7 days   Lab Units 01/26/19  0414   PH, ARTERIAL pH units 7.313*   PO2 ART mm Hg 63.1*   PCO2, ARTERIAL mm Hg 38.7   HCO3 ART mmol/L 19.2*     Results from last 7 days   Lab Units 01/26/19  0726 01/26/19  0313 01/25/19  1545   CRP mg/dL  --  2.66*  --    LACTATE mmol/L 2.5* 3.0* 1.1   WBC 10*3/mm3  --  5.85 10.03   HEMOGLOBIN g/dL  --  10.6* 12.0   HEMATOCRIT %  --  35.3* 36.8*   MCV fL  --  92.2 85.4   MCHC g/dL  --  30.0* 32.6*   PLATELETS 10*3/mm3  --  156 187     Results from last 7 days   Lab Units 01/26/19  0500 01/25/19  1545   SODIUM mmol/L 136 140   POTASSIUM mmol/L 4.0 3.8   CHLORIDE mmol/L 110 106   CO2 mmol/L 19.2* 26.5   BUN mg/dL 23* 22*   CREATININE mg/dL 1.24 1.29   EGFR IF NONAFRICN AM mL/min/1.73 43* 41*   CALCIUM mg/dL 8.4 9.3   GLUCOSE mg/dL 290* 110   ALBUMIN g/dL 3.50 4.40   BILIRUBIN mg/dL 0.2 0.5   ALK PHOS U/L 105* 135*   AST (SGOT) U/L 17 20   ALT (SGPT) U/L 25 28   Estimated Creatinine Clearance: 52.2 mL/min (by C-G formula based on SCr of 1.24 mg/dL).  No results found for: AMMONIA    Glucose   Date/Time Value Ref Range Status   01/26/2019 1044 299 (H) 70 - 130 mg/dL Final   01/26/2019 0713 301 (H)  70 - 130 mg/dL Final   01/25/2019 2220 292 (H) 70 - 130 mg/dL Final   01/25/2019 2043 173 (H) 70 - 130 mg/dL Final     Lab Results   Component Value Date    HGBA1C 6.80 (H) 12/13/2018     Lab Results   Component Value Date    TSH 3.310 12/13/2018    FREET4 1.88 (H) 05/31/2017       Blood Culture   Date Value Ref Range Status   01/25/2019 No growth at less than 24 hours  Preliminary   01/25/2019 No growth at less than 24 hours  Preliminary                 Pain Management Panel     Pain Management Panel Latest Ref Rng & Units 5/30/2017    AMPHETAMINES SCREEN, URINE Negative Negative    BARBITURATES SCREEN Negative Negative    BENZODIAZEPINE SCREEN, URINE Negative Negative    BUPRENORPHINE Negative Negative    COCAINE SCREEN, URINE Negative Negative    METHADONE SCREEN, URINE Negative Negative        I have personally reviewed the above laboratory results.   ---------------------------------------------------------------------------------------------------------------------  Imaging Results (last 7 days)     Procedure Component Value Units Date/Time    XR Chest 2 View [[id number]] Collected:  01/26/19 0848     Updated:  01/26/19 0851    Narrative:       EXAMINATION: XR CHEST 2 VW-      CLINICAL INDICATION:     cough, CP     TECHNIQUE:  XR CHEST 2 VW-      COMPARISON: 01/04/2019      FINDINGS:        Interstitial thickening is developed.   Cardiomegaly is noted.   No pneumothorax.   No effusions.   Stable appearance of the bony structures.            Impression:       Development of CHF.     This report was finalized on 1/26/2019 8:49 AM by Dr. Kunal Castanon MD.           I have personally reviewed the above radiology results.   ---------------------------------------------------------------------------------------------------------------------      Assessment & Plan        Assessment/Plan       ASSESSMENT:    1. Severe sepsis with lactic acid >2  2. COPD exacerbation vs pneumonia   3. UTI    PLAN:    The patient is a  73 y.o. Female with history of CAD, COPD, DM, HTN, and GERD who presents to the ED for left sided rib discomfort. She has a cough with yellow sputum. Symptoms have been on going for 2 days. She denies any SOA, nausea, vomiting, fever, chills, or diarrhea.     While at the ED patient had negative troponin with lactic acid of 1.1. WBC normal. UA is positive for UTI. Influenza antigen was negative.     She was admitted to the Observation Unit for further evaluation. Rocephin and Doxycycline continued with Duo-nebs and methylprednisolone.  SCDs initiated for DVT prophylaxis.     Today patient continues to have symptoms. Nursing staff reports no changes overnight. Afebrile with no diarrhea. Troponin continues to be negative. Lactic acid elevated to 3.0 overnight, currently at 2.5. CRP of 2.66 with normal WBC. Blood cultures showing no current growth. Chest radiograph reveals development of CHF.     At this time we are not too concerned with elevated Lactic Acid because we do not feel it is related to a infective etiology.     Will continue with Doxycyline and Rocephin at this time. Will continue to observe patient and address any acute changes.         Patient's findings and recommendations were discussed with patient and nursing staff      Code Status:   Code Status and Medical Interventions:   Ordered at: 01/25/19 1947     Code Status:    CPR     Medical Interventions (Level of Support Prior to Arrest):    Full           Juan Contreras PA-C  01/26/19  11:21 AM    Electronically signed by James Mercer MD at 1/28/2019 10:59 AM

## 2019-02-02 NOTE — PROGRESS NOTES
Discharge Planning Assessment  ADIS Giordano     Patient Name: Mayra Hays  MRN: [id number]  Today's Date: 2/2/2019    Admit Date: 1/25/2019        Discharge Plan     Row Name 02/02/19 1154       Plan    Final Discharge Disposition Code  01 - home or self-care    Final Note SS received consult patient wants a walker with wheels. SS spoke to pt and she has no preference for DME company and pt is agreeable for RW to be delivered to hospital. SS contacted [facility] [phone] per Sajan and faxed information including order to [facility] [phone]. Pt does not have utilize home health services. No other needs identified.         Unique Joyner

## 2019-02-03 ENCOUNTER — READMISSION MANAGEMENT (OUTPATIENT)
Dept: CALL CENTER | Facility: HOSPITAL | Age: 74
End: 2019-02-03

## 2019-02-03 NOTE — OUTREACH NOTE
Prep Survey      Responses   Facility patient discharged from?  [place]   Is patient eligible?  Yes   Discharge diagnosis  Severe sepsis, COPD exacerbation vs pneumonia  UTI   Does the patient have one of the following disease processes/diagnoses(primary or secondary)?  Sepsis   Does the patient have Home health ordered?  No   Is there a DME ordered?  Yes   What DME was ordered?  JOE from [facility]    Prep survey completed?  Yes          Bailee Funes RN

## 2019-02-04 ENCOUNTER — TELEPHONE (OUTPATIENT)
Dept: MEDSURG UNIT | Facility: HOSPITAL | Age: 74
End: 2019-02-04

## 2019-02-04 ENCOUNTER — READMISSION MANAGEMENT (OUTPATIENT)
Dept: CALL CENTER | Facility: HOSPITAL | Age: 74
End: 2019-02-04

## 2019-02-04 NOTE — OUTREACH NOTE
Sepsis Week 1 Survey      Responses   Facility patient discharged from?  [facility]   Does the patient have one of the following disease processes/diagnoses(primary or secondary)?  Sepsis   Is there a successful TCM telephone encounter documented?  No   Week 1 attempt successful?  Yes   Call start time  1728   Call end time  1743   Discharge diagnosis  Severe sepsis, COPD exacerbation vs pneumonia  UTI   Is patient permission given to speak with other caregiver?  Yes   List who call center can speak with  Hailey   Person spoke with today (if not patient) and relationship  Hailey, daughter   Meds reviewed with patient/caregiver?  Yes   Is the patient having any side effects they believe may be caused by any medication additions or changes?  No   Does the patient have all medications related to this admission filled (includes all antibiotics, inhalers, nebulizers,steroids,etc.)  No   What is keeping the patient from filling the prescriptions?  -- [Has not picked up script at this time. Encouraged daughter the need to  right away. ]   Nursing Interventions  Nurse provided patient education   Prescription comments  see above   Is the patient taking all medications as directed (includes completed medication regime)?  Yes   Comments regarding appointments  Cardiology appt on 02/28/2019   Does the patient have a primary care provider?   Yes   Comments regarding PCP  Encouraged daughter to make appt to see PCP. She verbalized understanding.    Does the patient have an appointment with their PCP within 7 days of discharge?  No   What is preventing the patient from scheduling follow up appointments within 7 days of discharge?  Haven't had time   Nursing Interventions  Educated patient on importance of making appointment, Advised patient to make appointment, Verified appointment date/time/provider   What DME was ordered?  RW from [facility]    Has all DME been delivered?  Yes   Psychosocial issues?  No   Did the patient  receive a copy of their discharge instructions?  Yes   Nursing interventions  Reviewed instructions with patient   What is the patient's perception of their health status since discharge?  Improving   Nursing interventions  Nurse provided patient education   Is the patient/caregiver able to teach back Sepsis?  S - Shivering,fever or very cold, E - Extreme pain or generalized discomfort (worst ever,especially abdomen), P - Pale or discolored skin, S - Sleepy, difficult to arouse,confused, I -   I feel like I might die-a feeling of hopelessness, S - Short of breath   Nursing interventions  Nurse provided reassurance to patient, Nurse provided patient education   Is patient/caregiver able to teach back steps to recovery at home?  Set small, achievable goals for return to baseline health, Rest and regain strength, Make a list of questions for PCP appoinment   Is the patient/caregiver able to teach back signs and symptoms of worsening condition:  Fever, Hyperthermia, Rapid heart rate (>90), Shortness of breath/rapid respiratory rate, Altered mental status(confusion/coma), Edema   Is the patient/caregiver able to teach back the hierarchy of who to call/visit for symptoms/problems? PCP, Specialist, Home health nurse, Urgent Care, ED, 911  Yes   Week 1 call completed?  Yes          Lazaro Davis RN

## 2019-02-11 ENCOUNTER — READMISSION MANAGEMENT (OUTPATIENT)
Dept: CALL CENTER | Facility: HOSPITAL | Age: 74
End: 2019-02-11

## 2019-02-11 NOTE — OUTREACH NOTE
Sepsis Week 2 Survey      Responses   Facility patient discharged from?  [facility]   Does the patient have one of the following disease processes/diagnoses(primary or secondary)?  Sepsis   Week 2 attempt successful?  Yes   Call start time  1630   Call end time  1637   Discharge diagnosis  Severe sepsis, COPD exacerbation vs pneumonia  UTI   Is patient permission given to speak with other caregiver?  Yes   List who call center can speak with  Hailey   Person spoke with today (if not patient) and relationship  Hailey, daughter   Meds reviewed with patient/caregiver?  Yes   Is the patient having any side effects they believe may be caused by any medication additions or changes?  No   Does the patient have all medications related to this admission filled (includes all antibiotics, inhalers, nebulizers,steroids,etc.)  Yes   Is the patient taking all medications as directed (includes completed medication regime)?  Yes   Comments regarding appointments  Cardiology appt on 02/28/2019   Does the patient have a primary care provider?   Yes   Comments regarding PCP  Encouraged daughter to make appt to see PCP. She verbalized understanding.    Does the patient have an appointment with their PCP within 7 days of discharge?  No   Nursing Interventions  Educated patient on importance of making appointment, Advised patient to make appointment, Verified appointment date/time/provider   Has the patient kept scheduled appointments due by today?  N/A   Has home health visited the patient within 72 hours of discharge?  N/A   What DME was ordered?  RW from [facility]    Has all DME been delivered?  Yes   Psychosocial issues?  No   Did the patient receive a copy of their discharge instructions?  Yes   Nursing interventions  Reviewed instructions with patient   What is the patient's perception of their health status since discharge?  Improving   Nursing interventions  Nurse provided patient education   Is the patient/caregiver able to  teach back Sepsis?  S - Shivering,fever or very cold, E - Extreme pain or generalized discomfort (worst ever,especially abdomen), P - Pale or discolored skin, S - Sleepy, difficult to arouse,confused, I -   I feel like I might die-a feeling of hopelessness, S - Short of breath   Nursing interventions  Nurse provided reassurance to patient, Nurse provided patient education   Is patient/caregiver able to teach back steps to recovery at home?  Set small, achievable goals for return to baseline health, Rest and regain strength, Make a list of questions for PCP appoinment   Is the patient/caregiver able to teach back signs and symptoms of worsening condition:  Fever, Hyperthermia, Rapid heart rate (>90), Shortness of breath/rapid respiratory rate, Altered mental status(confusion/coma), Edema   Is the patient/caregiver able to teach back the hierarchy of who to call/visit for symptoms/problems? PCP, Specialist, Home health nurse, Urgent Care, ED, 911  Yes   Week 2 call completed?  Yes          Lazaro Davis RN

## 2019-02-18 ENCOUNTER — READMISSION MANAGEMENT (OUTPATIENT)
Dept: CALL CENTER | Facility: HOSPITAL | Age: 74
End: 2019-02-18

## 2019-02-18 NOTE — OUTREACH NOTE
Sepsis Week 3 Survey      Responses   Facility patient discharged from?  [facility]   Does the patient have one of the following disease processes/diagnoses(primary or secondary)?  Sepsis   Week 3 attempt successful?  No   Unsuccessful attempts  Attempt 1          Leah Tipton RN

## 2019-02-20 ENCOUNTER — READMISSION MANAGEMENT (OUTPATIENT)
Dept: CALL CENTER | Facility: HOSPITAL | Age: 74
End: 2019-02-20

## 2019-02-20 NOTE — OUTREACH NOTE
Sepsis Week 3 Survey      Responses   Facility patient discharged from?  [facility]   Does the patient have one of the following disease processes/diagnoses(primary or secondary)?  Sepsis   Week 3 attempt successful?  No   Unsuccessful attempts  Attempt 2   Rescheduled  Revoked          Mikayla Sanchez RN

## 2019-02-28 ENCOUNTER — OFFICE VISIT (OUTPATIENT)
Dept: CARDIOLOGY | Facility: CLINIC | Age: 74
End: 2019-02-28

## 2019-02-28 VITALS
BODY MASS INDEX: 31.07 KG/M2 | RESPIRATION RATE: 18 BRPM | OXYGEN SATURATION: 94 % | SYSTOLIC BLOOD PRESSURE: 114 MMHG | HEART RATE: 78 BPM | DIASTOLIC BLOOD PRESSURE: 64 MMHG | HEIGHT: 70 IN | WEIGHT: 217 LBS

## 2019-02-28 DIAGNOSIS — I50.32 CHRONIC DIASTOLIC HEART FAILURE (HCC): ICD-10-CM

## 2019-02-28 DIAGNOSIS — I48.0 PAROXYSMAL ATRIAL FIBRILLATION (HCC): Primary | ICD-10-CM

## 2019-02-28 PROCEDURE — 99213 OFFICE O/P EST LOW 20 MIN: CPT | Performed by: PHYSICIAN ASSISTANT

## 2019-02-28 NOTE — PROGRESS NOTES
Elena Garcia APRN  Mayra Hays  1945 02/28/2019    Patient Active Problem List   Diagnosis   • Pneumonia   • Sepsis due to pneumonia (CMS/HCC)   • Chronic diastolic heart failure (CMS/HCC)   • Elevated troponin   • Chest pain in adult   • Simple chronic bronchitis (CMS/HCC)   • Essential hypertension   • Type 2 diabetes mellitus (CMS/HCC)   • Abnormal nuclear stress test with moderate to large size anteroapical and lateral wall myocardial ischemia.   • Pneumonia of left lower lobe due to infectious organism (CMS/HCC)   • Acute renal insufficiency   • Paroxysmal atrial fibrillation (CMS/HCC)   • Chest pain       Dear Elena Garcia APRN:    Subjective       History of Present Illness:    Chief Complaint   Patient presents with   • Atrial Fibrillation       Mayra Hays is a pleasant 73 y.o. female with a past medical history significant for atrial fibrillation for which she was recently evaluated in the hospital and was initiated on flecainide and Eliquis.  She is here for a regular cardiology follow-up.     Patient states that she recent recovered from pneumonia where she was admitted for pneumonia but is breathing much better. She does admit to some palpitations that occur a couple times a day that last less than a minute. She denies any chest pains. She also denies any syncope or dizziness.       No Known Allergies:      Current Outpatient Medications:   •  albuterol 108 (90 Base) MCG/ACT inhaler, Inhale 2 puffs 2 (Two) Times a Day., Disp: , Rfl:   •  apixaban (ELIQUIS) 5 MG tablet tablet, Take 5 mg by mouth 2 (Two) Times a Day., Disp: , Rfl:   •  atenolol (TENORMIN) 25 MG tablet, Take 25 mg by mouth Daily., Disp: , Rfl:   •  atorvastatin (LIPITOR) 20 MG tablet, Take 20 mg by mouth Daily., Disp: , Rfl:   •  bumetanide (BUMEX) 1 MG tablet, Take 1 mg by mouth Daily., Disp: , Rfl:   •  Fluticasone Furoate-Vilanterol (BREO ELLIPTA) 100-25 MCG/INH inhaler, Inhale 1 puff Daily., Disp: ,  "Rfl:   •  lisinopril (PRINIVIL,ZESTRIL) 5 MG tablet, Take 5 mg by mouth Daily., Disp: , Rfl:   •  metFORMIN (GLUCOPHAGE) 500 MG tablet, Take 500 mg by mouth 2 (Two) Times a Day With Meals., Disp: , Rfl:   •  pantoprazole (PROTONIX) 40 MG EC tablet, Take 40 mg by mouth Daily., Disp: , Rfl:   •  potassium chloride (K-DUR,KLOR-CON) 20 MEQ CR tablet, Take 20 mEq by mouth 2 (Two) Times a Day., Disp: , Rfl:   •  predniSONE (DELTASONE) 20 MG tablet, Take 1 tablet by mouth Daily With Breakfast., Disp: 5 tablet, Rfl: 0      The following portions of the patient's history were reviewed and updated as appropriate: allergies, current medications, past family history, past medical history, past social history, past surgical history and problem list.    Social History     Tobacco Use   • Smoking status: Former Smoker     Packs/day: 3.00     Types: Cigarettes     Last attempt to quit: 2015     Years since quittin.1   • Smokeless tobacco: Never Used   Substance Use Topics   • Alcohol use: No   • Drug use: No       Review of Systems   Constitution: Negative for weakness and malaise/fatigue.   Cardiovascular: Negative for chest pain, dyspnea on exertion and irregular heartbeat.   Respiratory: Positive for shortness of breath. Negative for cough.    Hematologic/Lymphatic: Negative for bleeding problem. Does not bruise/bleed easily.   Gastrointestinal: Negative for nausea and vomiting.   Neurological: Positive for dizziness.       Objective   Vitals:    19 1218   BP: 114/64   BP Location: Right arm   Patient Position: Sitting   Cuff Size: Adult   Pulse: 78   Resp: 18   SpO2: 94%   Weight: 98.4 kg (217 lb)   Height: 177.8 cm (70\")     Body mass index is 31.14 kg/m².        Physical Exam   Constitutional: She is oriented to person, place, and time. She appears well-developed and well-nourished. No distress.   HENT:   Head: Normocephalic and atraumatic.   Cardiovascular: Normal rate, regular rhythm, normal heart sounds and " intact distal pulses.   Pulmonary/Chest: Effort normal and breath sounds normal. No respiratory distress.   Musculoskeletal: She exhibits no edema.   Neurological: She is alert and oriented to person, place, and time.   Skin: She is not diaphoretic.       Lab Results   Component Value Date     01/31/2019    K 4.7 01/31/2019     01/31/2019    CO2 26.9 01/31/2019    BUN 29 (H) 01/31/2019    CREATININE 0.99 01/31/2019    GLUCOSE 215 (H) 01/31/2019    CALCIUM 9.4 01/31/2019    AST 17 01/26/2019    ALT 25 01/26/2019    ALKPHOS 105 (H) 01/26/2019     Lab Results   Component Value Date    CKTOTAL 14 (L) 06/25/2018     Lab Results   Component Value Date    WBC 7.79 01/30/2019    HGB 11.5 (L) 01/30/2019    HCT 35.8 (L) 01/30/2019     01/30/2019     Lab Results   Component Value Date    INR 1.02 06/01/2017    INR 1.03 05/31/2017    INR 0.98 05/31/2017     Lab Results   Component Value Date    MG 2.3 01/04/2019     Lab Results   Component Value Date    TSH 3.310 12/13/2018    TRIG 119 12/13/2018    HDL 33 (L) 12/13/2018    LDL 72 12/13/2018      Lab Results   Component Value Date    BNP 29.0 02/01/2019       During this visit the following were done:  Labs Reviewed [x]    Labs Ordered []    Radiology Reports Reviewed [x]    Radiology Ordered []    PCP Records Reviewed []    Referring Provider Records Reviewed []    ER Records Reviewed []    Hospital Records Reviewed []    History Obtained From Family []    Radiology Images Reviewed []    Other Reviewed []    Records Requested []       Procedures      Assessment/Plan    Diagnosis Plan   1. Paroxysmal atrial fibrillation (CMS/HCC)  Holter Monitor - 48 Hour   2. Chronic diastolic heart failure (CMS/HCC)              Recommendations:  1. Since patient does report daily palpitations and has a history of paroxysmal atrial fibrillation will go ahead and order a 48-hour Holter monitor.  2. Since she denies any bleeding gums we will continue Eliquis.  3. Continue  atenolol, Lipitor, Bumex, lisinopril.  4. Follow-up in 3 months.    Return in about 3 months (around 5/28/2019).    As always, I appreciate very much the opportunity to participate in the cardiovascular care of your patients.      With Best Regards,    SHY Meza disclaimer:  Much of this encounter note is an electronic transcription/translation of spoken language to printed text. The electronic translation of spoken language may permit erroneous, or at times, nonsensical words or phrases to be inadvertently transcribed; Although I have reviewed the note for such errors, some may still exist.

## 2019-03-08 ENCOUNTER — HOSPITAL ENCOUNTER (OUTPATIENT)
Dept: RESPIRATORY THERAPY | Facility: HOSPITAL | Age: 74
Discharge: HOME | End: 2019-03-08
Admitting: PHYSICIAN ASSISTANT

## 2019-03-08 DIAGNOSIS — I48.0 PAROXYSMAL ATRIAL FIBRILLATION (HCC): ICD-10-CM

## 2019-03-08 PROCEDURE — 93227 XTRNL ECG REC<48 HR R&I: CPT | Performed by: INTERNAL MEDICINE

## 2019-03-08 PROCEDURE — 93226 XTRNL ECG REC<48 HR SCAN A/R: CPT

## 2019-03-08 PROCEDURE — 93225 XTRNL ECG REC<48 HRS REC: CPT

## 2019-03-15 ENCOUNTER — TELEPHONE (OUTPATIENT)
Dept: CARDIOLOGY | Facility: CLINIC | Age: 74
End: 2019-03-15

## 2019-03-15 NOTE — TELEPHONE ENCOUNTER
----- Message from Darek Nelson PA-C sent at 3/15/2019  2:12 PM EDT -----  Please let patient know that had two episodes of a fast heart rate so im switching her atenolol to metoprolol tartrate twice a day. This medicine will help the heart rate more than atenolol.

## 2019-03-19 ENCOUNTER — TELEPHONE (OUTPATIENT)
Dept: CARDIOLOGY | Facility: CLINIC | Age: 74
End: 2019-03-19

## 2019-03-26 ENCOUNTER — OFFICE VISIT (OUTPATIENT)
Dept: PULMONOLOGY | Facility: CLINIC | Age: 74
End: 2019-03-26

## 2019-03-26 VITALS
HEIGHT: 67 IN | DIASTOLIC BLOOD PRESSURE: 69 MMHG | BODY MASS INDEX: 33.59 KG/M2 | OXYGEN SATURATION: 89 % | SYSTOLIC BLOOD PRESSURE: 105 MMHG | HEART RATE: 70 BPM | TEMPERATURE: 98.1 F | WEIGHT: 214 LBS

## 2019-03-26 DIAGNOSIS — I50.32 CHRONIC DIASTOLIC HEART FAILURE (HCC): Primary | ICD-10-CM

## 2019-03-26 DIAGNOSIS — G47.33 OSA (OBSTRUCTIVE SLEEP APNEA): ICD-10-CM

## 2019-03-26 DIAGNOSIS — J41.0 SIMPLE CHRONIC BRONCHITIS (HCC): ICD-10-CM

## 2019-03-26 DIAGNOSIS — R09.02 HYPOXIA: ICD-10-CM

## 2019-03-26 PROCEDURE — 94618 PULMONARY STRESS TESTING: CPT | Performed by: NURSE PRACTITIONER

## 2019-03-26 PROCEDURE — 94664 DEMO&/EVAL PT USE INHALER: CPT | Performed by: NURSE PRACTITIONER

## 2019-03-26 PROCEDURE — 99214 OFFICE O/P EST MOD 30 MIN: CPT | Performed by: NURSE PRACTITIONER

## 2019-04-03 PROBLEM — R09.02 HYPOXIA: Status: ACTIVE | Noted: 2019-04-03

## 2019-04-03 PROBLEM — G47.33 OSA (OBSTRUCTIVE SLEEP APNEA): Status: ACTIVE | Noted: 2019-04-03

## 2019-06-10 ENCOUNTER — OFFICE VISIT (OUTPATIENT)
Dept: CARDIOLOGY | Facility: CLINIC | Age: 74
End: 2019-06-10

## 2019-06-10 VITALS
SYSTOLIC BLOOD PRESSURE: 116 MMHG | RESPIRATION RATE: 16 BRPM | WEIGHT: 224 LBS | DIASTOLIC BLOOD PRESSURE: 62 MMHG | OXYGEN SATURATION: 93 % | BODY MASS INDEX: 33.95 KG/M2 | HEIGHT: 68 IN | HEART RATE: 70 BPM

## 2019-06-10 DIAGNOSIS — I48.0 PAROXYSMAL ATRIAL FIBRILLATION (HCC): ICD-10-CM

## 2019-06-10 DIAGNOSIS — I10 ESSENTIAL HYPERTENSION: ICD-10-CM

## 2019-06-10 DIAGNOSIS — I50.32 CHRONIC DIASTOLIC HEART FAILURE (HCC): ICD-10-CM

## 2019-06-10 DIAGNOSIS — I47.1 SUPRAVENTRICULAR TACHYCARDIA (HCC): Primary | ICD-10-CM

## 2019-06-10 PROCEDURE — 99214 OFFICE O/P EST MOD 30 MIN: CPT | Performed by: PHYSICIAN ASSISTANT

## 2019-06-10 RX ORDER — METOPROLOL SUCCINATE 50 MG/1
50 TABLET, EXTENDED RELEASE ORAL DAILY
Qty: 90 TABLET | Refills: 3 | Status: ON HOLD | OUTPATIENT
Start: 2019-06-10 | End: 2019-12-19

## 2019-06-10 RX ORDER — FERROUS SULFATE 325(65) MG
325 TABLET ORAL 2 TIMES DAILY
COMMUNITY
End: 2021-11-22 | Stop reason: HOSPADM

## 2019-06-10 RX ORDER — FUROSEMIDE 20 MG/1
20 TABLET ORAL EVERY OTHER DAY
COMMUNITY
End: 2019-12-20 | Stop reason: HOSPADM

## 2019-06-10 RX ORDER — ATENOLOL 25 MG/1
25 TABLET ORAL DAILY
COMMUNITY
End: 2019-06-10 | Stop reason: SDUPTHER

## 2019-06-10 NOTE — PROGRESS NOTES
Elena Garcia APRN  Mayra Hays  1945  06/10/2019    Patient Active Problem List   Diagnosis   • Pneumonia   • Sepsis due to pneumonia (CMS/HCC)   • Chronic diastolic heart failure (CMS/HCC)   • Elevated troponin   • Chest pain in adult   • Simple chronic bronchitis (CMS/HCC)   • Essential hypertension   • Type 2 diabetes mellitus (CMS/HCC)   • Abnormal nuclear stress test with moderate to large size anteroapical and lateral wall myocardial ischemia.   • Pneumonia of left lower lobe due to infectious organism (CMS/HCC)   • Acute renal insufficiency   • Paroxysmal atrial fibrillation (CMS/HCC)   • Chest pain   • OSWALDO (obstructive sleep apnea)   • Hypoxia       Dear Elena Garcia APRN:    Subjective     History of Present Illness:    Chief Complaint   Patient presents with   • Follow-up     3 mos., 48 hr holter   • Shortness of Breath     routine activity   • Edema     LE   • Med Management     list provided       Mayra Hays is a pleasant 73 y.o. female with a past medical history significant for atrial fibrillation for which she was recently evaluated in the hospital and was initiated on flecainide and Eliquis.  She is here for a regular cardiology follow-up.     Patient reports she is overall doing okay.  She did have a Holter monitor performed at last visit that did show a couple episodes of SVT as well as frequent PVCs with couplets and bigeminy.  She did recently have a stress test that showed no signs of ischemia in December 2018.  She currently denies any chest pains and reports mild infrequent palpitations.  She does report that she is long-standing hypothyroidism but was recently stopped on her thyroid medications for unclear reasons.  She denies shortness of breath worsening from her baseline or syncope.    No Known Allergies:      Current Outpatient Medications:   •  albuterol 108 (90 Base) MCG/ACT inhaler, Inhale 2 puffs 2 (Two) Times a Day., Disp: , Rfl:   •  apixaban  (ELIQUIS) 5 MG tablet tablet, Take 5 mg by mouth 2 (Two) Times a Day., Disp: , Rfl:   •  atorvastatin (LIPITOR) 20 MG tablet, Take 20 mg by mouth Daily., Disp: , Rfl:   •  bumetanide (BUMEX) 1 MG tablet, Take 1 mg by mouth Daily., Disp: , Rfl:   •  ferrous sulfate 325 (65 FE) MG tablet, Take 325 mg by mouth 2 (Two) Times a Day., Disp: , Rfl:   •  furosemide (LASIX) 20 MG tablet, Take 20 mg by mouth Daily., Disp: , Rfl:   •  lisinopril (PRINIVIL,ZESTRIL) 5 MG tablet, Take 5 mg by mouth Daily., Disp: , Rfl:   •  metFORMIN (GLUCOPHAGE) 500 MG tablet, Take 500 mg by mouth 2 (Two) Times a Day With Meals., Disp: , Rfl:   •  pantoprazole (PROTONIX) 40 MG EC tablet, Take 40 mg by mouth Daily., Disp: , Rfl:   •  potassium chloride (K-DUR,KLOR-CON) 20 MEQ CR tablet, Take 20 mEq by mouth 2 (Two) Times a Day., Disp: , Rfl:   •  Fluticasone Furoate-Vilanterol (BREO ELLIPTA) 100-25 MCG/INH inhaler, Inhale 1 puff Daily., Disp: , Rfl:   •  metoprolol succinate XL (TOPROL-XL) 50 MG 24 hr tablet, Take 1 tablet by mouth Daily., Disp: 90 tablet, Rfl: 3  •  predniSONE (DELTASONE) 20 MG tablet, Take 1 tablet by mouth Daily With Breakfast., Disp: 5 tablet, Rfl: 0  •  tiotropium bromide monohydrate (SPIRIVA RESPIMAT) 2.5 MCG/ACT aerosol solution inhaler, Inhale 1 puff Daily., Disp: 4 g, Rfl: 11    The following portions of the patient's history were reviewed and updated as appropriate: allergies, current medications, past family history, past medical history, past social history, past surgical history and problem list.    Social History     Tobacco Use   • Smoking status: Former Smoker     Packs/day: 3.00     Types: Cigarettes     Last attempt to quit: 2015     Years since quittin.4   • Smokeless tobacco: Never Used   Substance Use Topics   • Alcohol use: No   • Drug use: No       Review of Systems   Constitution: Negative for weakness and malaise/fatigue.   Cardiovascular: Positive for chest pain and leg swelling. Negative for dyspnea  "on exertion, irregular heartbeat and palpitations.   Respiratory: Positive for shortness of breath. Negative for cough.    Hematologic/Lymphatic: Negative for bleeding problem. Does not bruise/bleed easily.   Gastrointestinal: Negative for nausea and vomiting.       Objective   Vitals:    06/10/19 1039   BP: 116/62   Pulse: 70   Resp: 16   SpO2: 93%   Weight: 102 kg (224 lb)   Height: 172.7 cm (68\")     Body mass index is 34.06 kg/m².    Physical Exam   Constitutional: She is oriented to person, place, and time. She appears well-developed and well-nourished. No distress.   HENT:   Head: Normocephalic and atraumatic.   Cardiovascular: Normal rate, regular rhythm, normal heart sounds and intact distal pulses.   Pulmonary/Chest: Effort normal and breath sounds normal. No respiratory distress.   Musculoskeletal: She exhibits edema ( 1+ pedal edema bilaterally).   Neurological: She is alert and oriented to person, place, and time.   Skin: She is not diaphoretic.       Lab Results   Component Value Date     01/31/2019    K 4.7 01/31/2019     01/31/2019    CO2 26.9 01/31/2019    BUN 29 (H) 01/31/2019    CREATININE 0.99 01/31/2019    GLUCOSE 215 (H) 01/31/2019    CALCIUM 9.4 01/31/2019    AST 17 01/26/2019    ALT 25 01/26/2019    ALKPHOS 105 (H) 01/26/2019     Lab Results   Component Value Date    CKTOTAL 14 (L) 06/25/2018     Lab Results   Component Value Date    WBC 7.79 01/30/2019    HGB 11.5 (L) 01/30/2019    HCT 35.8 (L) 01/30/2019     01/30/2019     Lab Results   Component Value Date    INR 1.02 06/01/2017    INR 1.03 05/31/2017    INR 0.98 05/31/2017     Lab Results   Component Value Date    MG 2.3 01/04/2019     Lab Results   Component Value Date    TSH 3.310 12/13/2018    TRIG 119 12/13/2018    HDL 33 (L) 12/13/2018    LDL 72 12/13/2018      Lab Results   Component Value Date    BNP 29.0 02/01/2019       During this visit the following were done:  Labs Reviewed [x]    Labs Ordered []    Radiology " Reports Reviewed [x]    Radiology Ordered []    PCP Records Reviewed []    Referring Provider Records Reviewed []    ER Records Reviewed []    Hospital Records Reviewed []    History Obtained From Family []    Radiology Images Reviewed []    Other Reviewed []    Records Requested []       Procedures    Assessment/Plan    Diagnosis Plan   1. Supraventricular tachycardia (CMS/HCC)  Thyroid Panel With TSH    Magnesium    Basic Metabolic Panel   2. Essential hypertension  Blood Pressure Device   3. Chronic diastolic heart failure (CMS/HCC)     4. Paroxysmal atrial fibrillation (CMS/HCC)              Recommendations:  1. I discussed results of Holter monitor with the patient.  2. I will evaluate patient's frequent PVCs per  with thyroid panel, magnesium, and BMP.    3. Patient is also taking atenolol as well as metoprolol tartrate so we will discontinue her atenolol.  She reports that she was only taking Toprol tartrate once a day in order to increase compliance I will switch her metoprolol to long-acting metoprolol succinate.  4. Prior to keep a blood pressure log daily she reports that she does not have a blood pressure machine at home so I will prescribe this.    Return in about 3 months (around 9/10/2019).    As always, I appreciate very much the opportunity to participate in the cardiovascular care of your patients.      With Best Regards,    Darek Nelson PA-C

## 2019-06-13 ENCOUNTER — TELEPHONE (OUTPATIENT)
Dept: CARDIOLOGY | Facility: CLINIC | Age: 74
End: 2019-06-13

## 2019-06-13 NOTE — TELEPHONE ENCOUNTER
Hailey called and stated Carolyns thyroid Dr. Rob is requesting your office note.  Stating he is wanting to know what dosage we suggest she take of her thyroid medication.    I advised her per your office note that she needs to have her labs performed then we can send results to them.  She stated she would have these performed tomorrow.    She v/u.   Dr Rob in [place], phone no is [phone]

## 2019-06-27 ENCOUNTER — TELEPHONE (OUTPATIENT)
Dept: CARDIOLOGY | Facility: CLINIC | Age: 74
End: 2019-06-27

## 2019-06-27 NOTE — TELEPHONE ENCOUNTER
----- Message from Darek Nelson PA-C sent at 6/26/2019 10:51 AM EDT -----  Kidney function is within normal range but slightly decreased from last blood draw with encourage her to drink more water

## 2019-07-14 ENCOUNTER — HOSPITAL ENCOUNTER (EMERGENCY)
Facility: HOSPITAL | Age: 74
Discharge: HOME | End: 2019-07-14
Attending: EMERGENCY MEDICINE | Admitting: EMERGENCY MEDICINE

## 2019-07-14 ENCOUNTER — APPOINTMENT (OUTPATIENT)
Dept: GENERAL RADIOLOGY | Facility: HOSPITAL | Age: 74
End: 2019-07-14

## 2019-07-14 DIAGNOSIS — N39.0 ACUTE UTI: Primary | ICD-10-CM

## 2019-07-14 DIAGNOSIS — E86.0 DEHYDRATION: ICD-10-CM

## 2019-07-14 LAB
ALBUMIN SERPL-MCNC: 4.57 G/DL (ref 3.5–5.2)
ALBUMIN/GLOB SERPL: 1.3 G/DL
ALP SERPL-CCNC: 168 U/L (ref 39–117)
ALT SERPL W P-5'-P-CCNC: 17 U/L (ref 1–33)
ANION GAP SERPL CALCULATED.3IONS-SCNC: 16 MMOL/L (ref 5–15)
AST SERPL-CCNC: 14 U/L (ref 1–32)
BACTERIA UR QL AUTO: ABNORMAL /HPF
BASOPHILS # BLD AUTO: 0.04 10*3/MM3 (ref 0–0.2)
BASOPHILS NFR BLD AUTO: 0.3 % (ref 0–1.5)
BILIRUB SERPL-MCNC: 0.8 MG/DL (ref 0.2–1.2)
BILIRUB UR QL STRIP: ABNORMAL
BUN BLD-MCNC: 24 MG/DL (ref 8–23)
BUN/CREAT SERPL: 10.1 (ref 7–25)
CALCIUM SPEC-SCNC: 10.4 MG/DL (ref 8.6–10.5)
CHLORIDE SERPL-SCNC: 96 MMOL/L (ref 98–107)
CLARITY UR: ABNORMAL
CO2 SERPL-SCNC: 27 MMOL/L (ref 22–29)
COLOR UR: ABNORMAL
CREAT BLD-MCNC: 2.37 MG/DL (ref 0.57–1)
DEPRECATED RDW RBC AUTO: 41 FL (ref 37–54)
EOSINOPHIL # BLD AUTO: 0.07 10*3/MM3 (ref 0–0.4)
EOSINOPHIL NFR BLD AUTO: 0.5 % (ref 0.3–6.2)
ERYTHROCYTE [DISTWIDTH] IN BLOOD BY AUTOMATED COUNT: 13.2 % (ref 12.3–15.4)
GFR SERPL CREATININE-BSD FRML MDRD: 20 ML/MIN/1.73
GLOBULIN UR ELPH-MCNC: 3.6 GM/DL
GLUCOSE BLD-MCNC: 141 MG/DL (ref 65–99)
GLUCOSE UR STRIP-MCNC: NEGATIVE MG/DL
HCT VFR BLD AUTO: 39.6 % (ref 34–46.6)
HGB BLD-MCNC: 13 G/DL (ref 12–15.9)
HGB UR QL STRIP.AUTO: NEGATIVE
HOLD SPECIMEN: NORMAL
HOLD SPECIMEN: NORMAL
HYALINE CASTS UR QL AUTO: ABNORMAL /LPF
IMM GRANULOCYTES # BLD AUTO: 0.05 10*3/MM3 (ref 0–0.05)
IMM GRANULOCYTES NFR BLD AUTO: 0.3 % (ref 0–0.5)
KETONES UR QL STRIP: ABNORMAL
LEUKOCYTE ESTERASE UR QL STRIP.AUTO: ABNORMAL
LYMPHOCYTES # BLD AUTO: 1.55 10*3/MM3 (ref 0.7–3.1)
LYMPHOCYTES NFR BLD AUTO: 10 % (ref 19.6–45.3)
MCH RBC QN AUTO: 28.1 PG (ref 26.6–33)
MCHC RBC AUTO-ENTMCNC: 32.8 G/DL (ref 31.5–35.7)
MCV RBC AUTO: 85.7 FL (ref 79–97)
MONOCYTES # BLD AUTO: 1.6 10*3/MM3 (ref 0.1–0.9)
MONOCYTES NFR BLD AUTO: 10.4 % (ref 5–12)
NEUTROPHILS # BLD AUTO: 12.14 10*3/MM3 (ref 1.7–7)
NEUTROPHILS NFR BLD AUTO: 78.5 % (ref 42.7–76)
NITRITE UR QL STRIP: NEGATIVE
PH UR STRIP.AUTO: <=5 [PH] (ref 5–8)
PLATELET # BLD AUTO: 246 10*3/MM3 (ref 140–450)
PMV BLD AUTO: 11.4 FL (ref 6–12)
POTASSIUM BLD-SCNC: 4.6 MMOL/L (ref 3.5–5.2)
PROT SERPL-MCNC: 8.2 G/DL (ref 6–8.5)
PROT UR QL STRIP: ABNORMAL
RBC # BLD AUTO: 4.62 10*6/MM3 (ref 3.77–5.28)
RBC # UR: ABNORMAL /HPF
REF LAB TEST METHOD: ABNORMAL
SODIUM BLD-SCNC: 139 MMOL/L (ref 136–145)
SP GR UR STRIP: 1.02 (ref 1–1.03)
SQUAMOUS #/AREA URNS HPF: ABNORMAL /HPF
TROPONIN T SERPL-MCNC: <0.01 NG/ML (ref 0–0.03)
TROPONIN T SERPL-MCNC: <0.01 NG/ML (ref 0–0.03)
UROBILINOGEN UR QL STRIP: ABNORMAL
WBC NRBC COR # BLD: 15.45 10*3/MM3 (ref 3.4–10.8)
WBC UR QL AUTO: ABNORMAL /HPF
WHOLE BLOOD HOLD SPECIMEN: NORMAL
WHOLE BLOOD HOLD SPECIMEN: NORMAL
YEAST URNS QL MICRO: ABNORMAL /HPF

## 2019-07-14 PROCEDURE — 85025 COMPLETE CBC W/AUTO DIFF WBC: CPT | Performed by: EMERGENCY MEDICINE

## 2019-07-14 PROCEDURE — P9612 CATHETERIZE FOR URINE SPEC: HCPCS

## 2019-07-14 PROCEDURE — 36415 COLL VENOUS BLD VENIPUNCTURE: CPT

## 2019-07-14 PROCEDURE — 84484 ASSAY OF TROPONIN QUANT: CPT | Performed by: EMERGENCY MEDICINE

## 2019-07-14 PROCEDURE — 80053 COMPREHEN METABOLIC PANEL: CPT | Performed by: EMERGENCY MEDICINE

## 2019-07-14 PROCEDURE — 93005 ELECTROCARDIOGRAM TRACING: CPT | Performed by: EMERGENCY MEDICINE

## 2019-07-14 PROCEDURE — 87086 URINE CULTURE/COLONY COUNT: CPT | Performed by: EMERGENCY MEDICINE

## 2019-07-14 PROCEDURE — 71046 X-RAY EXAM CHEST 2 VIEWS: CPT

## 2019-07-14 PROCEDURE — 25010000002 CEFTRIAXONE: Performed by: EMERGENCY MEDICINE

## 2019-07-14 PROCEDURE — 93010 ELECTROCARDIOGRAM REPORT: CPT | Performed by: INTERNAL MEDICINE

## 2019-07-14 PROCEDURE — 81001 URINALYSIS AUTO W/SCOPE: CPT | Performed by: EMERGENCY MEDICINE

## 2019-07-14 PROCEDURE — 99285 EMERGENCY DEPT VISIT HI MDM: CPT

## 2019-07-14 PROCEDURE — 71046 X-RAY EXAM CHEST 2 VIEWS: CPT | Performed by: RADIOLOGY

## 2019-07-14 PROCEDURE — 96365 THER/PROPH/DIAG IV INF INIT: CPT

## 2019-07-14 RX ORDER — SODIUM CHLORIDE 0.9 % (FLUSH) 0.9 %
10 SYRINGE (ML) INJECTION AS NEEDED
Status: DISCONTINUED | OUTPATIENT
Start: 2019-07-14 | End: 2019-07-15 | Stop reason: HOSPADM

## 2019-07-14 RX ORDER — ASPIRIN 325 MG
325 TABLET ORAL ONCE
Status: COMPLETED | OUTPATIENT
Start: 2019-07-14 | End: 2019-07-14

## 2019-07-14 RX ORDER — CIPROFLOXACIN 250 MG/1
250 TABLET, FILM COATED ORAL 2 TIMES DAILY
Qty: 10 TABLET | Refills: 0 | Status: ON HOLD | OUTPATIENT
Start: 2019-07-14 | End: 2019-12-19

## 2019-07-14 RX ADMIN — SODIUM CHLORIDE 500 ML: 9 INJECTION, SOLUTION INTRAVENOUS at 20:41

## 2019-07-14 RX ADMIN — SODIUM CHLORIDE 1000 ML: 9 INJECTION, SOLUTION INTRAVENOUS at 19:44

## 2019-07-14 RX ADMIN — ASPIRIN 325 MG: 325 TABLET ORAL at 17:59

## 2019-07-14 RX ADMIN — CEFTRIAXONE 1 G: 1 INJECTION, POWDER, FOR SOLUTION INTRAMUSCULAR; INTRAVENOUS at 22:02

## 2019-07-15 VITALS
OXYGEN SATURATION: 98 % | WEIGHT: 210 LBS | DIASTOLIC BLOOD PRESSURE: 56 MMHG | TEMPERATURE: 98.1 F | HEART RATE: 82 BPM | BODY MASS INDEX: 31.1 KG/M2 | HEIGHT: 69 IN | RESPIRATION RATE: 18 BRPM | SYSTOLIC BLOOD PRESSURE: 103 MMHG

## 2019-07-15 NOTE — ED PROVIDER NOTES
Subjective   Patient presented to ER with generalized weakness.        Weakness - Generalized   Severity:  Moderate  Onset quality:  Gradual  Duration:  3 days  Timing:  Constant  Progression:  Worsening  Chronicity:  New  Context: dehydration    Relieved by:  Nothing  Worsened by:  Nothing  Ineffective treatments:  None tried  Associated symptoms: chest pain        Review of Systems   Constitutional: Positive for activity change and fatigue.   HENT: Negative.    Eyes: Negative.    Respiratory: Negative.    Cardiovascular: Positive for chest pain.   Gastrointestinal: Negative.    Endocrine: Negative.    Genitourinary: Negative.    Musculoskeletal: Negative.    Allergic/Immunologic: Negative.    Hematological: Negative.    Psychiatric/Behavioral: Negative.        Past Medical History:   Diagnosis Date   • Collapsed lung    • COPD (chronic obstructive pulmonary disease) (CMS/HCC)    • Diabetes mellitus (CMS/HCC)    • GERD (gastroesophageal reflux disease)    • Hyperlipidemia    • Hypertension    • Myocardial infarction (CMS/HCC)    • Stroke (CMS/[facility])        No Known Allergies    Past Surgical History:   Procedure Laterality Date   • CARDIAC CATHETERIZATION N/A 2017    Procedure: Left Heart Cath;  Surgeon: Jatinder Matias MD;  Location: [facility] INVASIVE LOC[facility];  Service:    • GALLBLADDER SURGERY         Family History   Problem Relation Age of Onset   • Heart disease Mother    • Heart disease Father        Social History     Socioeconomic History   • Marital status: Single     Spouse name: Not on file   • Number of children: Not on file   • Years of education: Not on file   • Highest education level: Not on file   Tobacco Use   • Smoking status: Former Smoker     Packs/day: 3.00     Types: Cigarettes     Last attempt to quit: 2015     Years since quittin.5   • Smokeless tobacco: Never Used   Substance and Sexual Activity   • Alcohol use: No   • Drug use: No           Objective   Physical Exam    Constitutional: She appears well-developed and well-nourished.   HENT:   Head: Normocephalic and atraumatic.   Eyes: EOM are normal. Pupils are equal, round, and reactive to light.   Neck: Normal range of motion.   Cardiovascular: Regular rhythm and normal pulses.   Pulmonary/Chest: She has decreased breath sounds in the right upper field and the left upper field.   Abdominal: Soft.   Musculoskeletal: Normal range of motion.        Right lower leg: Normal.        Left lower leg: Normal.   Skin: Skin is warm.   Nursing note and vitals reviewed.      Procedures           ED Course  ED Course as of Jul 14 2245   Sun Jul 14, 2019   1848 CXR negative  [FRIDA]      ED Course User Index  [FRIDA] Yuri Miles MD                  [facility]      Final diagnoses:   Acute UTI   Dehydration            Yuri Miles MD  07/14/19 2209       Yuri Miles MD  07/14/19 2245

## 2019-07-15 NOTE — ED NOTES
Provider at bedside at this time updating pt on results and plan to discharge home, all questions and concerns addressed, understanding verbalized.     Roya Noriega RN  07/15/19 2198

## 2019-07-16 LAB — BACTERIA SPEC AEROBE CULT: ABNORMAL

## 2019-08-09 ENCOUNTER — OFFICE VISIT (OUTPATIENT)
Dept: CARDIOLOGY | Facility: CLINIC | Age: 74
End: 2019-08-09

## 2019-08-09 VITALS
HEIGHT: 69 IN | RESPIRATION RATE: 16 BRPM | WEIGHT: 215 LBS | BODY MASS INDEX: 31.84 KG/M2 | DIASTOLIC BLOOD PRESSURE: 61 MMHG | HEART RATE: 53 BPM | SYSTOLIC BLOOD PRESSURE: 104 MMHG

## 2019-08-09 DIAGNOSIS — I50.32 CHRONIC DIASTOLIC HEART FAILURE (HCC): ICD-10-CM

## 2019-08-09 DIAGNOSIS — I48.0 PAROXYSMAL ATRIAL FIBRILLATION (HCC): Primary | ICD-10-CM

## 2019-08-09 DIAGNOSIS — I10 ESSENTIAL HYPERTENSION: ICD-10-CM

## 2019-08-09 PROCEDURE — 99213 OFFICE O/P EST LOW 20 MIN: CPT | Performed by: PHYSICIAN ASSISTANT

## 2019-08-09 RX ORDER — METHIMAZOLE 5 MG/1
5 TABLET ORAL 3 TIMES DAILY
Status: ON HOLD | COMMUNITY
End: 2019-12-19

## 2019-08-09 NOTE — PROGRESS NOTES
Elena Garcia APRN  Mayra Hays  1945 08/09/2019    Patient Active Problem List   Diagnosis   • Pneumonia   • Sepsis due to pneumonia (CMS/HCC)   • Chronic diastolic heart failure (CMS/HCC)   • Elevated troponin   • Chest pain in adult   • Simple chronic bronchitis (CMS/HCC)   • Essential hypertension   • Type 2 diabetes mellitus (CMS/HCC)   • Abnormal nuclear stress test with moderate to large size anteroapical and lateral wall myocardial ischemia.   • Pneumonia of left lower lobe due to infectious organism (CMS/HCC)   • Acute renal insufficiency   • Paroxysmal atrial fibrillation (CMS/HCC)   • Chest pain   • OSWALDO (obstructive sleep apnea)   • Hypoxia       Dear Elena Garcia APRN:    Subjective     History of Present Illness:    Chief Complaint   Patient presents with   • Atrial Fibrillation   • Chronic heart failure       Mayra Hays is a pleasant 74 y.o. female with a past medical history significant for atrial fibrillation for which she was recently evaluated in the hospital and was initiated on flecainide and Eliquis.  She is here for a regular cardiology follow-up.     Patient reports that she has been doing well.  She denies any shortness of breath, chest pains, palpitations, dizziness, or syncope.  She did have blood work drawn at last visit that did show some slightly abnormal thyroid blood work however primary care provider is managing this.  She denies any bleeding issues with Eliquis.    No Known Allergies:      Current Outpatient Medications:   •  apixaban (ELIQUIS) 5 MG tablet tablet, Take 5 mg by mouth 2 (Two) Times a Day., Disp: , Rfl:   •  atorvastatin (LIPITOR) 20 MG tablet, Take 20 mg by mouth Daily., Disp: , Rfl:   •  bumetanide (BUMEX) 1 MG tablet, Take 1 mg by mouth Daily., Disp: , Rfl:   •  ferrous sulfate 325 (65 FE) MG tablet, Take 325 mg by mouth 2 (Two) Times a Day., Disp: , Rfl:   •  furosemide (LASIX) 20 MG tablet, Take 20 mg by mouth Daily., Disp: ,  Rfl:   •  lisinopril (PRINIVIL,ZESTRIL) 5 MG tablet, Take 5 mg by mouth Daily., Disp: , Rfl:   •  metFORMIN (GLUCOPHAGE) 500 MG tablet, Take 500 mg by mouth 2 (Two) Times a Day With Meals., Disp: , Rfl:   •  methIMAzole (TAPAZOLE) 5 MG tablet, Take 5 mg by mouth 3 (Three) Times a Day., Disp: , Rfl:   •  metoprolol succinate XL (TOPROL-XL) 50 MG 24 hr tablet, Take 1 tablet by mouth Daily., Disp: 90 tablet, Rfl: 3  •  pantoprazole (PROTONIX) 40 MG EC tablet, Take 40 mg by mouth Daily., Disp: , Rfl:   •  potassium chloride (K-DUR,KLOR-CON) 20 MEQ CR tablet, Take 20 mEq by mouth 2 (Two) Times a Day., Disp: , Rfl:   •  albuterol 108 (90 Base) MCG/ACT inhaler, Inhale 2 puffs 2 (Two) Times a Day., Disp: , Rfl:   •  ciprofloxacin (CIPRO) 250 MG tablet, Take 1 tablet by mouth 2 (Two) Times a Day., Disp: 10 tablet, Rfl: 0  •  Fluticasone Furoate-Vilanterol (BREO ELLIPTA) 100-25 MCG/INH inhaler, Inhale 1 puff Daily., Disp: , Rfl:   •  predniSONE (DELTASONE) 20 MG tablet, Take 1 tablet by mouth Daily With Breakfast., Disp: 5 tablet, Rfl: 0  •  tiotropium bromide monohydrate (SPIRIVA RESPIMAT) 2.5 MCG/ACT aerosol solution inhaler, Inhale 1 puff Daily., Disp: 4 g, Rfl: 11    The following portions of the patient's history were reviewed and updated as appropriate: allergies, current medications, past family history, past medical history, past social history, past surgical history and problem list.    Social History     Tobacco Use   • Smoking status: Former Smoker     Packs/day: 3.00     Types: Cigarettes     Last attempt to quit: 2015     Years since quittin.6   • Smokeless tobacco: Never Used   Substance Use Topics   • Alcohol use: No   • Drug use: No       Review of Systems   Constitution: Negative for weakness and malaise/fatigue.   Cardiovascular: Negative for chest pain, dyspnea on exertion, irregular heartbeat, leg swelling, palpitations and syncope.   Respiratory: Negative for cough and shortness of breath.   "  Hematologic/Lymphatic: Negative for bleeding problem. Does not bruise/bleed easily.   Gastrointestinal: Negative for nausea and vomiting.   Neurological: Negative for dizziness.       Objective   Vitals:    08/09/19 1012   BP: 104/61   BP Location: Left arm   Pulse: 53   Resp: 16   Weight: 97.5 kg (215 lb)   Height: 175.3 cm (69.02\")     Body mass index is 31.74 kg/m².    Physical Exam   Constitutional: She is oriented to person, place, and time. She appears well-developed and well-nourished. No distress.   HENT:   Head: Normocephalic and atraumatic.   Cardiovascular: Normal rate, regular rhythm and normal heart sounds.   Pulmonary/Chest: Effort normal and breath sounds normal. No respiratory distress.   Musculoskeletal: She exhibits no edema.   Neurological: She is alert and oriented to person, place, and time.   Skin: She is not diaphoretic.       Lab Results   Component Value Date     07/14/2019    K 4.6 07/14/2019    CL 96 (L) 07/14/2019    CO2 27.0 07/14/2019    BUN 24 (H) 07/14/2019    CREATININE 2.37 (H) 07/14/2019    GLUCOSE 141 (H) 07/14/2019    CALCIUM 10.4 07/14/2019    AST 14 07/14/2019    ALT 17 07/14/2019    ALKPHOS 168 (H) 07/14/2019     Lab Results   Component Value Date    CKTOTAL 14 (L) 06/25/2018     Lab Results   Component Value Date    WBC 15.45 (H) 07/14/2019    HGB 13.0 07/14/2019    HCT 39.6 07/14/2019     07/14/2019     Lab Results   Component Value Date    INR 1.02 06/01/2017    INR 1.03 05/31/2017    INR 0.98 05/31/2017     Lab Results   Component Value Date    MG 2.3 01/04/2019     Lab Results   Component Value Date    TSH 3.310 12/13/2018    TRIG 119 12/13/2018    HDL 33 (L) 12/13/2018    LDL 72 12/13/2018      Lab Results   Component Value Date    BNP 29.0 02/01/2019       During this visit the following were done:  Labs Reviewed [x]    Labs Ordered []    Radiology Reports Reviewed [x]    Radiology Ordered []    PCP Records Reviewed []    Referring Provider Records " Reviewed []    ER Records Reviewed []    Hospital Records Reviewed []    History Obtained From Family []    Radiology Images Reviewed []    Other Reviewed []    Records Requested []       Procedures    Assessment/Plan    Diagnosis Plan   1. Paroxysmal atrial fibrillation (CMS/[facility])     2. Essential hypertension     3. Chronic diastolic heart failure (CMS/[facility])              Recommendations:  1. Overall patient is doing well from cardiac standpoint.  She was recently emergency room where she was found to have a UTI that was causing her to have increased weakness since then she has been treated and she reports she is doing better and has more energy.  Blood pressure is controlled and she denies any chest pains today I will continue metoprolol succinate, Bumex, Lipitor, Eliquis, and lisinopril.        Return in about 6 months (around 2/9/2020).    As always, I appreciate very much the opportunity to participate in the cardiovascular care of your patients.      With Best Regards,    Darek Nelson PA-C

## 2019-12-18 ENCOUNTER — APPOINTMENT (OUTPATIENT)
Dept: GENERAL RADIOLOGY | Facility: HOSPITAL | Age: 74
End: 2019-12-18

## 2019-12-18 ENCOUNTER — APPOINTMENT (OUTPATIENT)
Dept: CT IMAGING | Facility: HOSPITAL | Age: 74
End: 2019-12-18

## 2019-12-18 ENCOUNTER — HOSPITAL ENCOUNTER (OUTPATIENT)
Facility: HOSPITAL | Age: 74
Setting detail: OBSERVATION
Discharge: HOME | End: 2019-12-20
Attending: EMERGENCY MEDICINE | Admitting: HOSPITALIST

## 2019-12-18 DIAGNOSIS — R10.84 GENERALIZED ABDOMINAL PAIN: Primary | ICD-10-CM

## 2019-12-18 DIAGNOSIS — K42.9 UMBILICAL HERNIA WITHOUT OBSTRUCTION AND WITHOUT GANGRENE: ICD-10-CM

## 2019-12-18 DIAGNOSIS — R11.2 NON-INTRACTABLE VOMITING WITH NAUSEA, UNSPECIFIED VOMITING TYPE: ICD-10-CM

## 2019-12-18 DIAGNOSIS — D72.829 LEUKOCYTOSIS, UNSPECIFIED TYPE: ICD-10-CM

## 2019-12-18 LAB
ALBUMIN SERPL-MCNC: 4.16 G/DL (ref 3.5–5.2)
ALBUMIN/GLOB SERPL: 1.2 G/DL
ALP SERPL-CCNC: 140 U/L (ref 39–117)
ALT SERPL W P-5'-P-CCNC: 15 U/L (ref 1–33)
AMYLASE SERPL-CCNC: 42 U/L (ref 28–100)
ANION GAP SERPL CALCULATED.3IONS-SCNC: 11.7 MMOL/L (ref 5–15)
ANION GAP SERPL CALCULATED.3IONS-SCNC: 15.5 MMOL/L (ref 5–15)
AST SERPL-CCNC: 15 U/L (ref 1–32)
BACTERIA UR QL AUTO: ABNORMAL /HPF
BASOPHILS # BLD AUTO: 0.07 10*3/MM3 (ref 0–0.2)
BASOPHILS # BLD AUTO: 0.08 10*3/MM3 (ref 0–0.2)
BASOPHILS NFR BLD AUTO: 0.3 % (ref 0–1.5)
BASOPHILS NFR BLD AUTO: 0.4 % (ref 0–1.5)
BILIRUB SERPL-MCNC: 0.3 MG/DL (ref 0.2–1.2)
BILIRUB UR QL STRIP: ABNORMAL
BUN BLD-MCNC: 22 MG/DL (ref 8–23)
BUN BLD-MCNC: 23 MG/DL (ref 8–23)
BUN/CREAT SERPL: 14.5 (ref 7–25)
BUN/CREAT SERPL: 17.7 (ref 7–25)
CALCIUM SPEC-SCNC: 9.2 MG/DL (ref 8.6–10.5)
CALCIUM SPEC-SCNC: 9.8 MG/DL (ref 8.6–10.5)
CHLORIDE SERPL-SCNC: 97 MMOL/L (ref 98–107)
CHLORIDE SERPL-SCNC: 98 MMOL/L (ref 98–107)
CLARITY UR: ABNORMAL
CO2 SERPL-SCNC: 26.5 MMOL/L (ref 22–29)
CO2 SERPL-SCNC: 30.3 MMOL/L (ref 22–29)
COLOR UR: ABNORMAL
CREAT BLD-MCNC: 1.3 MG/DL (ref 0.57–1)
CREAT BLD-MCNC: 1.52 MG/DL (ref 0.57–1)
D-LACTATE SERPL-SCNC: 1.8 MMOL/L (ref 0.5–2)
DEPRECATED RDW RBC AUTO: 38.4 FL (ref 37–54)
DEPRECATED RDW RBC AUTO: 39.4 FL (ref 37–54)
EOSINOPHIL # BLD AUTO: 0.05 10*3/MM3 (ref 0–0.4)
EOSINOPHIL # BLD AUTO: 0.19 10*3/MM3 (ref 0–0.4)
EOSINOPHIL NFR BLD AUTO: 0.2 % (ref 0.3–6.2)
EOSINOPHIL NFR BLD AUTO: 1 % (ref 0.3–6.2)
ERYTHROCYTE [DISTWIDTH] IN BLOOD BY AUTOMATED COUNT: 12.2 % (ref 12.3–15.4)
ERYTHROCYTE [DISTWIDTH] IN BLOOD BY AUTOMATED COUNT: 12.5 % (ref 12.3–15.4)
GFR SERPL CREATININE-BSD FRML MDRD: 34 ML/MIN/1.73
GFR SERPL CREATININE-BSD FRML MDRD: 40 ML/MIN/1.73
GLOBULIN UR ELPH-MCNC: 3.4 GM/DL
GLUCOSE BLD-MCNC: 172 MG/DL (ref 65–99)
GLUCOSE BLD-MCNC: 243 MG/DL (ref 65–99)
GLUCOSE BLDC GLUCOMTR-MCNC: 157 MG/DL (ref 70–130)
GLUCOSE UR STRIP-MCNC: NEGATIVE MG/DL
HCT VFR BLD AUTO: 37.1 % (ref 34–46.6)
HCT VFR BLD AUTO: 39.7 % (ref 34–46.6)
HGB BLD-MCNC: 11.8 G/DL (ref 12–15.9)
HGB BLD-MCNC: 13 G/DL (ref 12–15.9)
HGB UR QL STRIP.AUTO: NEGATIVE
HYALINE CASTS UR QL AUTO: ABNORMAL /LPF
IMM GRANULOCYTES # BLD AUTO: 0.09 10*3/MM3 (ref 0–0.05)
IMM GRANULOCYTES # BLD AUTO: 0.11 10*3/MM3 (ref 0–0.05)
IMM GRANULOCYTES NFR BLD AUTO: 0.5 % (ref 0–0.5)
IMM GRANULOCYTES NFR BLD AUTO: 0.5 % (ref 0–0.5)
KETONES UR QL STRIP: ABNORMAL
LEUKOCYTE ESTERASE UR QL STRIP.AUTO: ABNORMAL
LIPASE SERPL-CCNC: 11 U/L (ref 13–60)
LIPASE SERPL-CCNC: 13 U/L (ref 13–60)
LYMPHOCYTES # BLD AUTO: 1.37 10*3/MM3 (ref 0.7–3.1)
LYMPHOCYTES # BLD AUTO: 2 10*3/MM3 (ref 0.7–3.1)
LYMPHOCYTES NFR BLD AUTO: 10.8 % (ref 19.6–45.3)
LYMPHOCYTES NFR BLD AUTO: 6 % (ref 19.6–45.3)
MAGNESIUM SERPL-MCNC: 1.7 MG/DL (ref 1.6–2.4)
MCH RBC QN AUTO: 27.5 PG (ref 26.6–33)
MCH RBC QN AUTO: 28.2 PG (ref 26.6–33)
MCHC RBC AUTO-ENTMCNC: 31.8 G/DL (ref 31.5–35.7)
MCHC RBC AUTO-ENTMCNC: 32.7 G/DL (ref 31.5–35.7)
MCV RBC AUTO: 86.1 FL (ref 79–97)
MCV RBC AUTO: 86.5 FL (ref 79–97)
MONOCYTES # BLD AUTO: 1.26 10*3/MM3 (ref 0.1–0.9)
MONOCYTES # BLD AUTO: 1.6 10*3/MM3 (ref 0.1–0.9)
MONOCYTES NFR BLD AUTO: 6.8 % (ref 5–12)
MONOCYTES NFR BLD AUTO: 7 % (ref 5–12)
NEUTROPHILS # BLD AUTO: 14.89 10*3/MM3 (ref 1.7–7)
NEUTROPHILS # BLD AUTO: 19.53 10*3/MM3 (ref 1.7–7)
NEUTROPHILS NFR BLD AUTO: 80.5 % (ref 42.7–76)
NEUTROPHILS NFR BLD AUTO: 86 % (ref 42.7–76)
NITRITE UR QL STRIP: NEGATIVE
NRBC BLD AUTO-RTO: 0 /100 WBC (ref 0–0.2)
NRBC BLD AUTO-RTO: 0 /100 WBC (ref 0–0.2)
PH UR STRIP.AUTO: <=5 [PH] (ref 5–8)
PHOSPHATE SERPL-MCNC: 3.8 MG/DL (ref 2.5–4.5)
PLATELET # BLD AUTO: 239 10*3/MM3 (ref 140–450)
PLATELET # BLD AUTO: 264 10*3/MM3 (ref 140–450)
PMV BLD AUTO: 11.3 FL (ref 6–12)
PMV BLD AUTO: 11.3 FL (ref 6–12)
POTASSIUM BLD-SCNC: 3.9 MMOL/L (ref 3.5–5.2)
POTASSIUM BLD-SCNC: 4.1 MMOL/L (ref 3.5–5.2)
PROT SERPL-MCNC: 7.6 G/DL (ref 6–8.5)
PROT UR QL STRIP: ABNORMAL
RBC # BLD AUTO: 4.29 10*6/MM3 (ref 3.77–5.28)
RBC # BLD AUTO: 4.61 10*6/MM3 (ref 3.77–5.28)
RBC # UR: ABNORMAL /HPF
REF LAB TEST METHOD: ABNORMAL
SODIUM BLD-SCNC: 139 MMOL/L (ref 136–145)
SODIUM BLD-SCNC: 140 MMOL/L (ref 136–145)
SP GR UR STRIP: 1.02 (ref 1–1.03)
SQUAMOUS #/AREA URNS HPF: ABNORMAL /HPF
T4 FREE SERPL-MCNC: 1.01 NG/DL (ref 0.93–1.7)
TROPONIN T SERPL-MCNC: <0.01 NG/ML (ref 0–0.03)
TSH SERPL DL<=0.05 MIU/L-ACNC: 2.67 UIU/ML (ref 0.27–4.2)
UROBILINOGEN UR QL STRIP: ABNORMAL
WBC NRBC COR # BLD: 18.51 10*3/MM3 (ref 3.4–10.8)
WBC NRBC COR # BLD: 22.73 10*3/MM3 (ref 3.4–10.8)
WBC UR QL AUTO: ABNORMAL /HPF

## 2019-12-18 PROCEDURE — 83605 ASSAY OF LACTIC ACID: CPT | Performed by: PHYSICIAN ASSISTANT

## 2019-12-18 PROCEDURE — 94799 UNLISTED PULMONARY SVC/PX: CPT

## 2019-12-18 PROCEDURE — 94640 AIRWAY INHALATION TREATMENT: CPT

## 2019-12-18 PROCEDURE — 74176 CT ABD & PELVIS W/O CONTRAST: CPT | Performed by: RADIOLOGY

## 2019-12-18 PROCEDURE — 71045 X-RAY EXAM CHEST 1 VIEW: CPT

## 2019-12-18 PROCEDURE — G0378 HOSPITAL OBSERVATION PER HR: HCPCS

## 2019-12-18 PROCEDURE — 93005 ELECTROCARDIOGRAM TRACING: CPT | Performed by: PHYSICIAN ASSISTANT

## 2019-12-18 PROCEDURE — 80053 COMPREHEN METABOLIC PANEL: CPT | Performed by: PHYSICIAN ASSISTANT

## 2019-12-18 PROCEDURE — 82150 ASSAY OF AMYLASE: CPT | Performed by: HOSPITALIST

## 2019-12-18 PROCEDURE — 36415 COLL VENOUS BLD VENIPUNCTURE: CPT

## 2019-12-18 PROCEDURE — 93010 ELECTROCARDIOGRAM REPORT: CPT | Performed by: INTERNAL MEDICINE

## 2019-12-18 PROCEDURE — 82962 GLUCOSE BLOOD TEST: CPT

## 2019-12-18 PROCEDURE — 84100 ASSAY OF PHOSPHORUS: CPT | Performed by: HOSPITALIST

## 2019-12-18 PROCEDURE — 87186 SC STD MICRODIL/AGAR DIL: CPT | Performed by: PHYSICIAN ASSISTANT

## 2019-12-18 PROCEDURE — 96361 HYDRATE IV INFUSION ADD-ON: CPT

## 2019-12-18 PROCEDURE — 83735 ASSAY OF MAGNESIUM: CPT | Performed by: HOSPITALIST

## 2019-12-18 PROCEDURE — 99203 OFFICE O/P NEW LOW 30 MIN: CPT | Performed by: SURGERY

## 2019-12-18 PROCEDURE — 87040 BLOOD CULTURE FOR BACTERIA: CPT | Performed by: HOSPITALIST

## 2019-12-18 PROCEDURE — 74176 CT ABD & PELVIS W/O CONTRAST: CPT

## 2019-12-18 PROCEDURE — 71045 X-RAY EXAM CHEST 1 VIEW: CPT | Performed by: RADIOLOGY

## 2019-12-18 PROCEDURE — 87086 URINE CULTURE/COLONY COUNT: CPT | Performed by: PHYSICIAN ASSISTANT

## 2019-12-18 PROCEDURE — 25010000002 ONDANSETRON PER 1 MG: Performed by: PHYSICIAN ASSISTANT

## 2019-12-18 PROCEDURE — 85025 COMPLETE CBC W/AUTO DIFF WBC: CPT | Performed by: PHYSICIAN ASSISTANT

## 2019-12-18 PROCEDURE — 96365 THER/PROPH/DIAG IV INF INIT: CPT

## 2019-12-18 PROCEDURE — 25010000002 HEPARIN (PORCINE) PER 1000 UNITS: Performed by: HOSPITALIST

## 2019-12-18 PROCEDURE — 96372 THER/PROPH/DIAG INJ SC/IM: CPT

## 2019-12-18 PROCEDURE — 85025 COMPLETE CBC W/AUTO DIFF WBC: CPT | Performed by: HOSPITALIST

## 2019-12-18 PROCEDURE — 83690 ASSAY OF LIPASE: CPT | Performed by: PHYSICIAN ASSISTANT

## 2019-12-18 PROCEDURE — 84443 ASSAY THYROID STIM HORMONE: CPT | Performed by: PHYSICIAN ASSISTANT

## 2019-12-18 PROCEDURE — 84439 ASSAY OF FREE THYROXINE: CPT | Performed by: PHYSICIAN ASSISTANT

## 2019-12-18 PROCEDURE — 63710000001 INSULIN ASPART PER 5 UNITS: Performed by: HOSPITALIST

## 2019-12-18 PROCEDURE — 99284 EMERGENCY DEPT VISIT MOD MDM: CPT

## 2019-12-18 PROCEDURE — 83690 ASSAY OF LIPASE: CPT | Performed by: HOSPITALIST

## 2019-12-18 PROCEDURE — 87040 BLOOD CULTURE FOR BACTERIA: CPT | Performed by: PHYSICIAN ASSISTANT

## 2019-12-18 PROCEDURE — 81001 URINALYSIS AUTO W/SCOPE: CPT | Performed by: PHYSICIAN ASSISTANT

## 2019-12-18 PROCEDURE — 25010000002 PIPERACILLIN-TAZOBACTAM: Performed by: PHYSICIAN ASSISTANT

## 2019-12-18 PROCEDURE — 84484 ASSAY OF TROPONIN QUANT: CPT | Performed by: PHYSICIAN ASSISTANT

## 2019-12-18 PROCEDURE — 96375 TX/PRO/DX INJ NEW DRUG ADDON: CPT

## 2019-12-18 RX ORDER — SODIUM CHLORIDE 450 MG/100ML
100 INJECTION, SOLUTION INTRAVENOUS CONTINUOUS
Status: DISCONTINUED | OUTPATIENT
Start: 2019-12-18 | End: 2019-12-19

## 2019-12-18 RX ORDER — BUDESONIDE 0.5 MG/2ML
0.5 INHALANT ORAL
Status: DISCONTINUED | OUTPATIENT
Start: 2019-12-18 | End: 2019-12-20 | Stop reason: HOSPADM

## 2019-12-18 RX ORDER — SODIUM CHLORIDE 9 MG/ML
75 INJECTION, SOLUTION INTRAVENOUS CONTINUOUS
Status: DISCONTINUED | OUTPATIENT
Start: 2019-12-18 | End: 2019-12-20 | Stop reason: HOSPADM

## 2019-12-18 RX ORDER — PANTOPRAZOLE SODIUM 40 MG/1
40 TABLET, DELAYED RELEASE ORAL
Status: DISCONTINUED | OUTPATIENT
Start: 2019-12-19 | End: 2019-12-20 | Stop reason: HOSPADM

## 2019-12-18 RX ORDER — DEXTROSE MONOHYDRATE 25 G/50ML
25 INJECTION, SOLUTION INTRAVENOUS
Status: DISCONTINUED | OUTPATIENT
Start: 2019-12-18 | End: 2019-12-20 | Stop reason: HOSPADM

## 2019-12-18 RX ORDER — SODIUM CHLORIDE 0.9 % (FLUSH) 0.9 %
10 SYRINGE (ML) INJECTION AS NEEDED
Status: DISCONTINUED | OUTPATIENT
Start: 2019-12-18 | End: 2019-12-20 | Stop reason: HOSPADM

## 2019-12-18 RX ORDER — ARFORMOTEROL TARTRATE 15 UG/2ML
15 SOLUTION RESPIRATORY (INHALATION)
Status: DISCONTINUED | OUTPATIENT
Start: 2019-12-18 | End: 2019-12-20 | Stop reason: HOSPADM

## 2019-12-18 RX ORDER — SODIUM CHLORIDE 0.9 % (FLUSH) 0.9 %
10 SYRINGE (ML) INJECTION EVERY 12 HOURS SCHEDULED
Status: DISCONTINUED | OUTPATIENT
Start: 2019-12-18 | End: 2019-12-20 | Stop reason: HOSPADM

## 2019-12-18 RX ORDER — NICOTINE POLACRILEX 4 MG
15 LOZENGE BUCCAL
Status: DISCONTINUED | OUTPATIENT
Start: 2019-12-18 | End: 2019-12-20 | Stop reason: HOSPADM

## 2019-12-18 RX ORDER — ONDANSETRON 2 MG/ML
4 INJECTION INTRAMUSCULAR; INTRAVENOUS ONCE
Status: COMPLETED | OUTPATIENT
Start: 2019-12-18 | End: 2019-12-18

## 2019-12-18 RX ORDER — HEPARIN SODIUM 5000 [USP'U]/ML
5000 INJECTION, SOLUTION INTRAVENOUS; SUBCUTANEOUS EVERY 8 HOURS SCHEDULED
Status: DISCONTINUED | OUTPATIENT
Start: 2019-12-18 | End: 2019-12-19

## 2019-12-18 RX ORDER — IPRATROPIUM BROMIDE AND ALBUTEROL SULFATE 2.5; .5 MG/3ML; MG/3ML
3 SOLUTION RESPIRATORY (INHALATION)
Status: DISCONTINUED | OUTPATIENT
Start: 2019-12-18 | End: 2019-12-20 | Stop reason: HOSPADM

## 2019-12-18 RX ADMIN — INSULIN ASPART 2 UNITS: 100 INJECTION, SOLUTION INTRAVENOUS; SUBCUTANEOUS at 20:36

## 2019-12-18 RX ADMIN — ONDANSETRON 4 MG: 2 INJECTION, SOLUTION INTRAMUSCULAR; INTRAVENOUS at 12:15

## 2019-12-18 RX ADMIN — BUDESONIDE 0.5 MG: 0.5 INHALANT RESPIRATORY (INHALATION) at 22:23

## 2019-12-18 RX ADMIN — PIPERACILLIN SODIUM,TAZOBACTAM SODIUM 3.38 G: 3; .375 INJECTION, POWDER, FOR SOLUTION INTRAVENOUS at 13:22

## 2019-12-18 RX ADMIN — SODIUM CHLORIDE 125 ML/HR: 9 INJECTION, SOLUTION INTRAVENOUS at 12:13

## 2019-12-18 RX ADMIN — HEPARIN SODIUM 5000 UNITS: 5000 INJECTION INTRAVENOUS; SUBCUTANEOUS at 20:37

## 2019-12-18 RX ADMIN — SODIUM CHLORIDE, PRESERVATIVE FREE 10 ML: 5 INJECTION INTRAVENOUS at 20:36

## 2019-12-18 RX ADMIN — ARFORMOTEROL TARTRATE 15 MCG: 15 SOLUTION RESPIRATORY (INHALATION) at 22:23

## 2019-12-18 RX ADMIN — SODIUM CHLORIDE 100 ML/HR: 4.5 INJECTION, SOLUTION INTRAVENOUS at 18:47

## 2019-12-18 RX ADMIN — IPRATROPIUM BROMIDE AND ALBUTEROL SULFATE 3 ML: 2.5; .5 SOLUTION RESPIRATORY (INHALATION) at 19:48

## 2019-12-18 NOTE — CONSULTS
Consults    Patient Care Team:  Niki Antony APRN as PCP - General (Nurse Practitioner)  Jonathan Del Valle MD as Consulting Physician (Endocrinology)    Chief complaint: Abdominal pain    Subjective     74-year-old female presenting with abdominal pain with significant nausea and vomiting.  She is passing gas and had a bowel movement yesterday evening.  She does have a periumbilical hernia with reducible small bowel but this is currently reduced without evidence of obstruction on scan or examination.  Leukocytosis is present but may be related to inflammatory change from gastroenteritis with concurrent dehydration and contraction.      Review of Systems   Constitutional: Negative for activity change, appetite change, chills and fever.   HENT: Negative for sore throat and trouble swallowing.    Eyes: Negative for visual disturbance.   Respiratory: Negative for cough and shortness of breath.    Cardiovascular: Negative for chest pain and palpitations.   Gastrointestinal: Positive for abdominal distention, nausea and vomiting. Negative for abdominal pain, blood in stool, constipation and diarrhea.   Endocrine: Negative for cold intolerance and heat intolerance.   Genitourinary: Negative for dysuria.   Musculoskeletal: Negative for joint swelling.   Skin: Negative for color change, rash and wound.   Allergic/Immunologic: Negative for immunocompromised state.   Neurological: Negative for dizziness, seizures, weakness and headaches.   Hematological: Negative for adenopathy. Does not bruise/bleed easily.   Psychiatric/Behavioral: Negative for agitation and confusion.        Past Medical History:   Diagnosis Date   • Collapsed lung    • COPD (chronic obstructive pulmonary disease) (CMS/HCC)    • Diabetes mellitus (CMS/HCC)    • GERD (gastroesophageal reflux disease)    • Hyperlipidemia    • Hypertension    • Myocardial infarction (CMS/HCC)    • Stroke (CMS/HCC)    ,   Past Surgical History:   Procedure Laterality Date   •  CARDIAC CATHETERIZATION N/A 2017    Procedure: Left Heart Cath;  Surgeon: Jatinder Matias MD;  Location:  [facility];  Service:    • GALLBLADDER SURGERY     ,   Family History   Problem Relation Age of Onset   • Heart disease Mother    • Heart disease Father    ,   Social History     Tobacco Use   • Smoking status: Former Smoker     Packs/day: 3.00     Types: Cigarettes     Last attempt to quit:      Years since quittin.9   • Smokeless tobacco: Never Used   Substance Use Topics   • Alcohol use: No   • Drug use: No   ,   (Not in a hospital admission), Scheduled Meds:   , Continuous Infusions:    sodium chloride 125 mL/hr Last Rate: 125 mL/hr (19 1213)   , PRN Meds:  •  [COMPLETED] Insert peripheral IV **AND** sodium chloride and Allergies:  Patient has no known allergies.    Objective      Vital Signs  Temp:  [97.7 °F (36.5 °C)] 97.7 °F (36.5 °C)  Heart Rate:  [74-85] 74  Resp:  [16-20] 16  BP: (101-107)/(50-59) 102/50    Physical Exam   Constitutional: She is oriented to person, place, and time. She appears well-developed.   HENT:   Head: Normocephalic and atraumatic.   Mouth/Throat: Mucous membranes are normal.   Eyes: Pupils are equal, round, and reactive to light. Conjunctivae are normal.   Neck: Neck supple. No JVD present. No tracheal deviation present. No thyromegaly present.   Cardiovascular: Normal rate and regular rhythm. Exam reveals no gallop and no friction rub.   No murmur heard.  Pulmonary/Chest: Effort normal and breath sounds normal.   Abdominal: Soft. She exhibits no distension. There is no splenomegaly or hepatomegaly. There is no tenderness. No hernia.   Reducible periumbilical hernia superior and right of the umbilicus.  No peritoneal signs.  No evidence of obstruction   Musculoskeletal: Normal range of motion. She exhibits no deformity.   Neurological: She is alert and oriented to person, place, and time.   Skin: Skin is warm and dry.   Psychiatric: She has a  normal mood and affect.       Results Review:    I reviewed the patient's new clinical results.        Assessment/Plan       Nausea & vomiting      Assessment:  (74-year-old female presenting with acute kidney injury and abdominal pain with nausea and vomiting.  I suspect gastroenteritis.  She has a reducible umbilical hernia and no evidence of obstruction on imaging.).     Plan:   (Agree with admission for IV fluids and observation  Clear liquid diet  Surgery will continue to follow  Trend leukocytosis).       I discussed the patients findings and my recommendations with patient    Petr Velásquez MD  12/18/19  4:43 PM

## 2019-12-18 NOTE — H&P
[facility] HOSPITALIST HISTORY AND PHYSICAL    Patient Identification:  Name:  Mayra Hays  Age:  74 y.o.  Sex:  female  :  1945  MRN:  [id number]   Visit Number:  [id number]  Room number:  410/10  Primary Care Physician:  Niki Antony APRN     Chief complaint:    Chief Complaint   Patient presents with   • Vomiting       History of presenting illness:  74 y.o. female history significant for COPD she wears oxygen as needed at home, presented to emergency room with chief complaints of nausea and vomiting.  She was just discharge approximately 4 days ago from [facility] for similar symptoms of nausea and vomiting, apparently she was diagnosed with possible small bowel obstruction and was treated conservatively.  No NG tube was placed as patient can recall.  She improved she was discharged 4 days ago she was feeling fine until yesterday started having postprandial dyspepsia associated nausea.  Since early this morning she has been vomiting.  Bilious nonbloody, last bowel movement was this morning described as normal non-melanotic or constipatated.  Denies abdominal pain.  Denies fever, no exposure to family members with similar symptoms.  Because of this she was seen in emergency room, she was noted to have white count of 22,000, otherwise vital signs was unremarkable, room air O2 saturation 95%.  CT scan of the abdomen pelvis is unremarkable except for periumbilical hernia containing bowels and fat that was easily received reducible.  Dr. Velásquez was able to evaluate the patient at the emergency room and agreed with reducible hernia, recommended admit for observation and further management.    With regards to her COPD, patient reports chronic coughing with whitish sputum production no worsening from baseline, she does wheeze especially with exertion.  This is confirmed by her son at  bedside.    ---------------------------------------------------------------------------------------------------------------------   Review of Systems   Constitutional: Negative for activity change, appetite change, chills, diaphoresis, fatigue, fever and unexpected weight change.   HENT: Negative for congestion, dental problem, drooling, ear discharge, ear pain, facial swelling, hearing loss, mouth sores, nosebleeds, postnasal drip, rhinorrhea, sinus pressure, sinus pain, sneezing, sore throat, tinnitus, trouble swallowing and voice change.    Eyes: Negative.    Respiratory: Positive for cough and wheezing.    Cardiovascular: Negative.    Gastrointestinal: Positive for nausea and vomiting. Negative for abdominal distention, abdominal pain, anal bleeding, blood in stool, constipation, diarrhea and rectal pain.   Endocrine: Negative.    Genitourinary: Negative.    Musculoskeletal: Negative.    Skin: Negative.    Allergic/Immunologic: Negative.    Neurological: Negative.    Hematological: Negative.    Psychiatric/Behavioral: Negative.         ---------------------------------------------------------------------------------------------------------------------   Past Medical History:   Diagnosis Date   • Collapsed lung    • COPD (chronic obstructive pulmonary disease) (CMS/HCC)    • Diabetes mellitus (CMS/HCC)    • GERD (gastroesophageal reflux disease)    • Hyperlipidemia    • Hypertension    • Myocardial infarction (CMS/HCC)    • Stroke (CMS/HCC)      Past Surgical History:   Procedure Laterality Date   • CARDIAC CATHETERIZATION N/A 8/22/2017    Procedure: Left Heart Cath;  Surgeon: Jatinder Matias MD;  Location: [facility] INVASIVE LOCATION;  Service:    • GALLBLADDER SURGERY       Family History   Problem Relation Age of Onset   • Heart disease Mother    • Heart disease Father      Social History     Socioeconomic History   • Marital status: Single     Spouse name: Not on file   • Number of children: Not on file   •  Years of education: Not on file   • Highest education level: Not on file   Tobacco Use   • Smoking status: Former Smoker     Packs/day: 3.00     Types: Cigarettes     Last attempt to quit: 2015     Years since quittin.9   • Smokeless tobacco: Never Used   Substance and Sexual Activity   • Alcohol use: No   • Drug use: No   • Sexual activity: Defer     ---------------------------------------------------------------------------------------------------------------------   Allergies:  Patient has no known allergies.  ---------------------------------------------------------------------------------------------------------------------   Prior to Admission Medications     Prescriptions Last Dose Informant Patient Reported? Taking?    albuterol 108 (90 Base) MCG/ACT inhaler  Pharmacy Yes No    Inhale 2 puffs 2 (Two) Times a Day.    apixaban (ELIQUIS) 5 MG tablet tablet  Pharmacy Yes No    Take 5 mg by mouth 2 (Two) Times a Day.    atorvastatin (LIPITOR) 20 MG tablet  Pharmacy Yes No    Take 20 mg by mouth Daily.    bumetanide (BUMEX) 1 MG tablet  Pharmacy Yes No    Take 1 mg by mouth Daily.    ciprofloxacin (CIPRO) 250 MG tablet   No No    Take 1 tablet by mouth 2 (Two) Times a Day.    ferrous sulfate 325 (65 FE) MG tablet   Yes No    Take 325 mg by mouth 2 (Two) Times a Day.    Fluticasone Furoate-Vilanterol (BREO ELLIPTA) 100-25 MCG/INH inhaler  Pharmacy Yes No    Inhale 1 puff Daily.    furosemide (LASIX) 20 MG tablet   Yes No    Take 20 mg by mouth Daily.    lisinopril (PRINIVIL,ZESTRIL) 5 MG tablet  Pharmacy Yes No    Take 5 mg by mouth Daily.    metFORMIN (GLUCOPHAGE) 500 MG tablet  Pharmacy Yes No    Take 500 mg by mouth 2 (Two) Times a Day With Meals.    methIMAzole (TAPAZOLE) 5 MG tablet   Yes No    Take 5 mg by mouth 3 (Three) Times a Day.    metoprolol succinate XL (TOPROL-XL) 50 MG 24 hr tablet   No No    Take 1 tablet by mouth Daily.    pantoprazole (PROTONIX) 40 MG EC tablet  Pharmacy Yes No    Take 40 mg  by mouth Daily.    potassium chloride (K-DUR,KLOR-CON) 20 MEQ CR tablet  Pharmacy Yes No    Take 20 mEq by mouth 2 (Two) Times a Day.    predniSONE (DELTASONE) 20 MG tablet   No No    Take 1 tablet by mouth Daily With Breakfast.    tiotropium bromide monohydrate (SPIRIVA RESPIMAT) 2.5 MCG/ACT aerosol solution inhaler   No No    Inhale 1 puff Daily.        ---------------------------------------------------------------------------------------------------------------------   Vital Signs:  Temp:  [97.7 °F (36.5 °C)] 97.7 °F (36.5 °C)  Heart Rate:  [74-85] 74  Resp:  [16-20] 16  BP: (101-107)/(50-59) 102/50    No data found.  SpO2:  [95 %-96 %] 96 %  on   ;      Body mass index is 34.17 kg/m².    Wt Readings from Last 3 Encounters:   12/18/19 99 kg (218 lb 3.2 oz)   08/09/19 97.5 kg (215 lb)   07/14/19 95.3 kg (210 lb)               ---------------------------------------------------------------------------------------------------------------------   Physical Exam:  Constitutional:  Well-developed and well-nourished.  Awake and alert, chronically ill-appearing, she is not in respiratory distress.      HENT:  Head: Normocephalic and atraumatic.  Mouth:  Moist mucous membranes.  Edentulous  Eyes:  Conjunctivae and EOM are normal.  Pupils are equal, round, and reactive to light.  No scleral icterus.  Neck:  Neck supple.  No JVD present.  No bruit no hepatojugular reflux  Cardiovascular:  Normal rate, regular rhythm and normal heart sounds with no murmur.  Pulmonary/Chest:  No respiratory distress, no wheezes, coarse breath sounds right base improved after deep inspiration coughing and subsequent clearing of crackles and wheezing.  good air movement.  Abdominal:  Soft.  Bowel sounds are normal.  No distension and no tenderness.  Obese no guarding no rigidity no bruit positive bowel sounds, I could no longer elicit the periumbilical hernia.  Musculoskeletal:  No edema, no tenderness, and no deformity.  No red or swollen  joints anywhere.    Neurological:  Alert and oriented to person, place, and time.  No cranial nerve deficit.  No tongue deviation.  No facial droop.  No slurred speech.   Skin:  Skin is warm and dry.  No rash noted.  No pallor.   Peripheral vascular:  No edema and strong pulses on all 4 extremities.  Genitourinary: No Shetty catheter ---------------------------------------------------------------------------------------------------------------------  EKG:          Telemetry:    I have personally looked at both the EKG  --------------------------------------------------------------------------------------------------------------------  Results from last 7 days   Lab Units 12/18/19  1205   TROPONIN T ng/mL <0.010     Results from last 7 days   Lab Units 12/18/19  1243 12/18/19  1205   LACTATE mmol/L 1.8  --    WBC 10*3/mm3  --  22.73*   HEMOGLOBIN g/dL  --  13.0   HEMATOCRIT %  --  39.7   MCV fL  --  86.1   MCHC g/dL  --  32.7   PLATELETS 10*3/mm3  --  264     Results from last 7 days   Lab Units 12/18/19  1205   SODIUM mmol/L 140   POTASSIUM mmol/L 4.1   CHLORIDE mmol/L 98   CO2 mmol/L 26.5   BUN mg/dL 22   CREATININE mg/dL 1.52*   EGFR IF NONAFRICN AM mL/min/1.73 34*   CALCIUM mg/dL 9.8   GLUCOSE mg/dL 243*   ALBUMIN g/dL 4.16   BILIRUBIN mg/dL 0.3   ALK PHOS U/L 140*   AST (SGOT) U/L 15   ALT (SGPT) U/L 15   Estimated Creatinine Clearance: 39.9 mL/min (A) (by C-G formula based on SCr of 1.52 mg/dL (H)).    No results found for: HGBA1C, POCGLU  No results found for: AMMONIA    Results from last 7 days   Lab Units 12/18/19  1213   NITRITE UA  Negative   WBC UA /HPF 6-12*   BACTERIA UA /HPF None Seen   SQUAM EPITHEL UA /HPF 13-20*     No results found for: BLOODCXNo results found for: RESPCXNo results found for: URINECXNo results found for: WOUNDCXNo results found for: BODYFLDCXNo results found for: STOOLCX  pH No results found for: PHART   pO2 No results found for: PO2ART   pCO2 No results found for: TUL6QIP   HCO3 No  results found for: QAH5REM     I have personally looked at the labs and they are summarized above.  ----------------------------------------------------------------------------------------------------------------------  Imaging Results (Last 24 Hours)     Procedure Component Value Units Date/Time    XR Chest 1 View [[id number]] Collected:  12/18/19 1201     Updated:  12/18/19 1419    Narrative:       XR CHEST 1 VW-     CLINICAL INDICATION: vomiting, weakness        COMPARISON: 06/24/2018      TECHNIQUE: Single frontal view of the chest.     FINDINGS:     There is no focal alveolar infiltrate or effusion.  The cardiac silhouette is normal. The pulmonary vasculature is  unremarkable.  There is no evidence of an acute osseous abnormality.   There are no suspicious-appearing parenchymal soft tissue nodules.          Impression:       No evidence of active or acute cardiopulmonary disease on today's chest  radiograph.     This report was finalized on 12/18/2019 12:01 PM by Dr. Mauricio Santiago MD.       CT Abdomen Pelvis Without Contrast [[id number]] Collected:  12/18/19 1348     Updated:  12/18/19 1419    Narrative:       CT ABDOMEN PELVIS WO CONTRAST-     CLINICAL INDICATION: abd pain        COMPARISON: 11/07/2018     TECHNIQUE: Axial images were acquired from the lung bases through the  pubic symphysis without any IV or oral contrast.  Reformatted images were created in both the coronal and sagittal planes.     Radiation dose reduction techniques were utilized per ALARA protocol.  Automated exposure control was initiated through either or [employer] or  [employer] software packages by  protocol.           FINDINGS:   The lung bases are clear. There are no pleural effusions.     The liver is homogeneous. There is no evidence of focal hepatic mass     The spleen is homogeneous     There is no peripancreatic stranding or pancreatic head mass.     There is no adrenal enlargement.     The kidneys show no evidence of  hydronephrosis or hydroureter. I do not  see any distal ureteral stones.      Otherwise I do not see any free fluid or walled off fluid collections.     There is a bowel containing periumbilical hernia. No evidence of bowel  obstruction.     There is no evidence of mesenteric or retroperitoneal adenopathy             Impression:       : Periumbilical hernia containing bowel              This report was finalized on 12/18/2019 1:50 PM by Dr. Mauricio Santiago MD.           I have personally reviewed the radiology images and read the final radiology report.  ----------------------------------------------------------------------------------------------------------------------  Assessment and Plan:  -Recurrent nausea and vomiting etiology undetermined at this time  -Leukocytosis with no obvious signs of infection likely from SIRS  -COPD/chronic bronchitis with no exacerbation  -Essential hypertension  -Obesity with a BMI of 34.1  -Dyslipidemia  -Diabetes type 2 non-insulin-requiring  -History of CVA with no residual deficit  -Chronic anticoagulation for stroke prophylaxis  -History of paroxysmal atrial fibrillation  -History of thyroid disorder take likely hyperthyroid patient is on methimazole, her free T4 and TSH is normal at this time.  -Reducible umbilical hernia    Monitor abdominal status, IV fluids, clear liquids for now, IV PPI, Accu-Chek and sliding scale, surgery service has been consulted, will discontinue Glucophage for now due to her GI symptoms, patient may need upper endoscopy if she continued to be symptomatic.  With regards to urge DVT prophylaxis, once her Eliquis is confirmed we will discontinue the heparin subcu prophylaxis.  Continue with DuoNeb and inhaled corticosteroids home medication.  Repeat CBC in the morning.    Plan is been outlined to the patient together with her son and agreed further management may change as condition evolves.    Patient will be admitted under observation service for now  with plans as mentioned above.      Tara Nino MD  12/18/19  4:11 PM

## 2019-12-18 NOTE — ED PROVIDER NOTES
Subjective   74-year-old white female presents secondary to 9-day history of nausea vomiting with generalized abdominal discomfort.  She denies any fever.  She denies dysuria.  She denies diarrhea.  She states that she did have a bowel movement this morning which was normal.  No blood.  He was previously admitted to [facility] from December 9-11.  She states that she was not feeling any better at disposition and has continued to worsen.  She has not been tolerating food or fluids well.  No other complaints at this time.          Review of Systems   Constitutional: Negative.  Negative for fever.   HENT: Negative.    Respiratory: Negative.    Cardiovascular: Negative.  Negative for chest pain.   Gastrointestinal: Positive for abdominal pain, nausea and vomiting. Negative for diarrhea.   Endocrine: Negative.    Genitourinary: Negative.  Negative for dysuria.   Skin: Negative.    Neurological: Negative.    Psychiatric/Behavioral: Negative.    All other systems reviewed and are negative.      Past Medical History:   Diagnosis Date   • Collapsed lung    • COPD (chronic obstructive pulmonary disease) (CMS/[facility])    • Diabetes mellitus (CMS/[facility])    • GERD (gastroesophageal reflux disease)    • Hyperlipidemia    • Hypertension    • Myocardial infarction (CMS/[facility])    • Stroke (CMS/[facility])        No Known Allergies    Past Surgical History:   Procedure Laterality Date   • CARDIAC CATHETERIZATION N/A 8/22/2017    Procedure: Left Heart Cath;  Surgeon: Jatinder Matias MD;  Location: [facility] INVASIVE LOCATION;  Service:    • GALLBLADDER SURGERY         Family History   Problem Relation Age of Onset   • Heart disease Mother    • Heart disease Father        Social History     Socioeconomic History   • Marital status: Single     Spouse name: Not on file   • Number of children: Not on file   • Years of education: Not on file   • Highest education level: Not on file   Tobacco Use   • Smoking status: Former Smoker      Packs/day: 3.00     Types: Cigarettes     Last attempt to quit: 2015     Years since quittin.9   • Smokeless tobacco: Never Used   Substance and Sexual Activity   • Alcohol use: No   • Drug use: No   • Sexual activity: Defer           Objective   Physical Exam   Constitutional: She is oriented to person, place, and time. She appears well-developed and well-nourished. No distress.   HENT:   Head: Normocephalic and atraumatic.   Right Ear: External ear normal.   Left Ear: External ear normal.   Nose: Nose normal.   Eyes: Pupils are equal, round, and reactive to light. Conjunctivae and EOM are normal.   Neck: Normal range of motion. Neck supple. No JVD present. No tracheal deviation present.   Cardiovascular: Normal rate, regular rhythm and normal heart sounds.   No murmur heard.  Pulmonary/Chest: Effort normal and breath sounds normal. No respiratory distress. She has no wheezes.   Abdominal: Soft. Bowel sounds are normal. There is tenderness (Non focal. ).   Musculoskeletal: Normal range of motion. She exhibits no edema or deformity.   Neurological: She is alert and oriented to person, place, and time. No cranial nerve deficit.   Skin: Skin is warm and dry. No rash noted. She is not diaphoretic. No erythema. No pallor.   Psychiatric: She has a normal mood and affect. Her behavior is normal. Thought content normal.   Nursing note and vitals reviewed.      Procedures           ED Course  ED Course as of Dec 18 1751   Wed Dec 18, 2019   1431 Dr Velásquez coming to [facility].    [JI]   1511 Dr. velásquez evaluated the patient.  He agrees to consult though feels that the patient needs to be admitted to the hospitalist.  He feels that the umbilical hernia is easily reducible and not surgically emergent at this time.  Paged the hospitalist.    [JI]   1520 Admitted to Dr Blackburn    [JI]      ED Course User Index  [JI] Ruy Diaz PA                      No data recorded                        MDM  Number of Diagnoses or  Management Options  Generalized abdominal pain: new and requires workup  Leukocytosis, unspecified type: new and requires workup  Non-intractable vomiting with nausea, unspecified vomiting type: new and requires workup  Umbilical hernia without obstruction and without gangrene: new and requires workup     Amount and/or Complexity of Data Reviewed  Clinical lab tests: reviewed and ordered  Tests in the radiology section of CPT®: reviewed and ordered  Tests in the medicine section of CPT®: reviewed and ordered  Discuss the patient with other providers: yes  Independent visualization of images, tracings, or specimens: yes    Risk of Complications, Morbidity, and/or Mortality  Presenting problems: moderate        Final diagnoses:   Generalized abdominal pain   Umbilical hernia without obstruction and without gangrene   Leukocytosis, unspecified type   Non-intractable vomiting with nausea, unspecified vomiting type              Ruy Diaz, PA  12/18/19 [id number]

## 2019-12-18 NOTE — PLAN OF CARE
Problem: Patient Care Overview  Goal: Plan of Care Review  Outcome: Ongoing (interventions implemented as appropriate)     Patient resting comfortably in bed. No complaints at this time

## 2019-12-19 LAB
ANION GAP SERPL CALCULATED.3IONS-SCNC: 11.4 MMOL/L (ref 5–15)
BASOPHILS # BLD AUTO: 0.07 10*3/MM3 (ref 0–0.2)
BASOPHILS NFR BLD AUTO: 0.4 % (ref 0–1.5)
BUN BLD-MCNC: 21 MG/DL (ref 8–23)
BUN/CREAT SERPL: 17.2 (ref 7–25)
CALCIUM SPEC-SCNC: 8.6 MG/DL (ref 8.6–10.5)
CHLORIDE SERPL-SCNC: 98 MMOL/L (ref 98–107)
CO2 SERPL-SCNC: 26.6 MMOL/L (ref 22–29)
CREAT BLD-MCNC: 1.22 MG/DL (ref 0.57–1)
DEPRECATED RDW RBC AUTO: 40 FL (ref 37–54)
EOSINOPHIL # BLD AUTO: 0.22 10*3/MM3 (ref 0–0.4)
EOSINOPHIL NFR BLD AUTO: 1.3 % (ref 0.3–6.2)
ERYTHROCYTE [DISTWIDTH] IN BLOOD BY AUTOMATED COUNT: 12.6 % (ref 12.3–15.4)
GFR SERPL CREATININE-BSD FRML MDRD: 43 ML/MIN/1.73
GLUCOSE BLD-MCNC: 135 MG/DL (ref 65–99)
GLUCOSE BLDC GLUCOMTR-MCNC: 146 MG/DL (ref 70–130)
GLUCOSE BLDC GLUCOMTR-MCNC: 149 MG/DL (ref 70–130)
GLUCOSE BLDC GLUCOMTR-MCNC: 169 MG/DL (ref 70–130)
GLUCOSE BLDC GLUCOMTR-MCNC: 199 MG/DL (ref 70–130)
HBA1C MFR BLD: 6.6 % (ref 4.8–5.6)
HCT VFR BLD AUTO: 36.4 % (ref 34–46.6)
HGB BLD-MCNC: 11.3 G/DL (ref 12–15.9)
IMM GRANULOCYTES # BLD AUTO: 0.08 10*3/MM3 (ref 0–0.05)
IMM GRANULOCYTES NFR BLD AUTO: 0.5 % (ref 0–0.5)
LYMPHOCYTES # BLD AUTO: 2.25 10*3/MM3 (ref 0.7–3.1)
LYMPHOCYTES NFR BLD AUTO: 13.4 % (ref 19.6–45.3)
MAGNESIUM SERPL-MCNC: 1.6 MG/DL (ref 1.6–2.4)
MCH RBC QN AUTO: 27.4 PG (ref 26.6–33)
MCHC RBC AUTO-ENTMCNC: 31 G/DL (ref 31.5–35.7)
MCV RBC AUTO: 88.1 FL (ref 79–97)
MONOCYTES # BLD AUTO: 1.24 10*3/MM3 (ref 0.1–0.9)
MONOCYTES NFR BLD AUTO: 7.4 % (ref 5–12)
NEUTROPHILS # BLD AUTO: 12.93 10*3/MM3 (ref 1.7–7)
NEUTROPHILS NFR BLD AUTO: 77 % (ref 42.7–76)
NRBC BLD AUTO-RTO: 0 /100 WBC (ref 0–0.2)
PHOSPHATE SERPL-MCNC: 3.6 MG/DL (ref 2.5–4.5)
PLATELET # BLD AUTO: 210 10*3/MM3 (ref 140–450)
PMV BLD AUTO: 10.9 FL (ref 6–12)
POTASSIUM BLD-SCNC: 4.1 MMOL/L (ref 3.5–5.2)
RBC # BLD AUTO: 4.13 10*6/MM3 (ref 3.77–5.28)
SODIUM BLD-SCNC: 136 MMOL/L (ref 136–145)
WBC NRBC COR # BLD: 16.79 10*3/MM3 (ref 3.4–10.8)

## 2019-12-19 PROCEDURE — 94799 UNLISTED PULMONARY SVC/PX: CPT

## 2019-12-19 PROCEDURE — G0378 HOSPITAL OBSERVATION PER HR: HCPCS

## 2019-12-19 PROCEDURE — 85025 COMPLETE CBC W/AUTO DIFF WBC: CPT | Performed by: HOSPITALIST

## 2019-12-19 PROCEDURE — 99213 OFFICE O/P EST LOW 20 MIN: CPT | Performed by: SURGERY

## 2019-12-19 PROCEDURE — 99226 PR SBSQ OBSERVATION CARE/DAY 35 MINUTES: CPT | Performed by: PHYSICIAN ASSISTANT

## 2019-12-19 PROCEDURE — 83036 HEMOGLOBIN GLYCOSYLATED A1C: CPT | Performed by: PHYSICIAN ASSISTANT

## 2019-12-19 PROCEDURE — 63710000001 INSULIN ASPART PER 5 UNITS: Performed by: HOSPITALIST

## 2019-12-19 PROCEDURE — 25010000002 HEPARIN (PORCINE) PER 1000 UNITS: Performed by: HOSPITALIST

## 2019-12-19 PROCEDURE — 84100 ASSAY OF PHOSPHORUS: CPT | Performed by: HOSPITALIST

## 2019-12-19 PROCEDURE — 83735 ASSAY OF MAGNESIUM: CPT | Performed by: HOSPITALIST

## 2019-12-19 PROCEDURE — 96367 TX/PROPH/DG ADDL SEQ IV INF: CPT

## 2019-12-19 PROCEDURE — 80048 BASIC METABOLIC PNL TOTAL CA: CPT | Performed by: HOSPITALIST

## 2019-12-19 PROCEDURE — 96361 HYDRATE IV INFUSION ADD-ON: CPT

## 2019-12-19 PROCEDURE — 96372 THER/PROPH/DIAG INJ SC/IM: CPT

## 2019-12-19 PROCEDURE — 25010000002 MAGNESIUM SULFATE IN D5W 1G/100ML (PREMIX) 1-5 GM/100ML-% SOLUTION: Performed by: PHYSICIAN ASSISTANT

## 2019-12-19 PROCEDURE — 82962 GLUCOSE BLOOD TEST: CPT

## 2019-12-19 RX ORDER — NITROGLYCERIN 0.4 MG/1
0.4 TABLET SUBLINGUAL
Status: DISCONTINUED | OUTPATIENT
Start: 2019-12-19 | End: 2019-12-20 | Stop reason: HOSPADM

## 2019-12-19 RX ORDER — PANTOPRAZOLE SODIUM 40 MG/1
40 TABLET, DELAYED RELEASE ORAL DAILY
Status: DISCONTINUED | OUTPATIENT
Start: 2019-12-19 | End: 2019-12-19 | Stop reason: SDUPTHER

## 2019-12-19 RX ORDER — HYDROXYZINE HYDROCHLORIDE 25 MG/1
25 TABLET, FILM COATED ORAL NIGHTLY
Status: CANCELLED | OUTPATIENT
Start: 2019-12-19

## 2019-12-19 RX ORDER — ATORVASTATIN CALCIUM 20 MG/1
20 TABLET, FILM COATED ORAL DAILY
Status: CANCELLED | OUTPATIENT
Start: 2019-12-19

## 2019-12-19 RX ORDER — POTASSIUM CHLORIDE 20 MEQ/1
20 TABLET, EXTENDED RELEASE ORAL DAILY
Status: CANCELLED | OUTPATIENT
Start: 2019-12-19

## 2019-12-19 RX ORDER — MAGNESIUM SULFATE HEPTAHYDRATE 40 MG/ML
2 INJECTION, SOLUTION INTRAVENOUS AS NEEDED
Status: DISCONTINUED | OUTPATIENT
Start: 2019-12-19 | End: 2019-12-20 | Stop reason: HOSPADM

## 2019-12-19 RX ORDER — ALBUTEROL SULFATE 2.5 MG/3ML
2.5 SOLUTION RESPIRATORY (INHALATION) EVERY 4 HOURS PRN
Status: CANCELLED | OUTPATIENT
Start: 2019-12-19

## 2019-12-19 RX ORDER — METOPROLOL TARTRATE 50 MG/1
50 TABLET, FILM COATED ORAL DAILY
Status: CANCELLED | OUTPATIENT
Start: 2019-12-19

## 2019-12-19 RX ORDER — MAGNESIUM SULFATE 1 G/100ML
1 INJECTION INTRAVENOUS AS NEEDED
Status: DISCONTINUED | OUTPATIENT
Start: 2019-12-19 | End: 2019-12-20 | Stop reason: HOSPADM

## 2019-12-19 RX ORDER — POTASSIUM CHLORIDE 1.5 G/1.77G
40 POWDER, FOR SOLUTION ORAL AS NEEDED
Status: DISCONTINUED | OUTPATIENT
Start: 2019-12-19 | End: 2019-12-20 | Stop reason: HOSPADM

## 2019-12-19 RX ORDER — FERROUS SULFATE 325(65) MG
325 TABLET ORAL 2 TIMES DAILY WITH MEALS
Status: DISCONTINUED | OUTPATIENT
Start: 2019-12-19 | End: 2019-12-20 | Stop reason: HOSPADM

## 2019-12-19 RX ORDER — ALBUTEROL SULFATE 2.5 MG/3ML
2.5 SOLUTION RESPIRATORY (INHALATION) EVERY 6 HOURS PRN
Status: CANCELLED | OUTPATIENT
Start: 2019-12-19

## 2019-12-19 RX ORDER — PANTOPRAZOLE SODIUM 40 MG/1
40 TABLET, DELAYED RELEASE ORAL DAILY
Status: CANCELLED | OUTPATIENT
Start: 2019-12-19

## 2019-12-19 RX ORDER — FERROUS SULFATE 325(65) MG
325 TABLET ORAL 2 TIMES DAILY
Status: CANCELLED | OUTPATIENT
Start: 2019-12-19

## 2019-12-19 RX ORDER — BUMETANIDE 1 MG/1
1 TABLET ORAL DAILY
Status: DISCONTINUED | OUTPATIENT
Start: 2019-12-19 | End: 2019-12-19

## 2019-12-19 RX ORDER — LISINOPRIL 2.5 MG/1
5 TABLET ORAL DAILY
Status: DISCONTINUED | OUTPATIENT
Start: 2019-12-19 | End: 2019-12-19

## 2019-12-19 RX ORDER — MAGNESIUM SULFATE 1 G/100ML
1 INJECTION INTRAVENOUS ONCE
Status: COMPLETED | OUTPATIENT
Start: 2019-12-19 | End: 2019-12-19

## 2019-12-19 RX ORDER — POTASSIUM CHLORIDE 20 MEQ/1
40 TABLET, EXTENDED RELEASE ORAL AS NEEDED
Status: DISCONTINUED | OUTPATIENT
Start: 2019-12-19 | End: 2019-12-20 | Stop reason: HOSPADM

## 2019-12-19 RX ORDER — HYDROXYZINE HYDROCHLORIDE 25 MG/1
25 TABLET, FILM COATED ORAL NIGHTLY
Status: ON HOLD | COMMUNITY
End: 2020-05-13

## 2019-12-19 RX ORDER — BUMETANIDE 1 MG/1
1 TABLET ORAL DAILY
Status: CANCELLED | OUTPATIENT
Start: 2019-12-19

## 2019-12-19 RX ORDER — FUROSEMIDE 20 MG/1
20 TABLET ORAL EVERY OTHER DAY
Status: CANCELLED | OUTPATIENT
Start: 2019-12-19

## 2019-12-19 RX ORDER — METOPROLOL TARTRATE 50 MG/1
50 TABLET, FILM COATED ORAL DAILY
COMMUNITY
End: 2019-12-20 | Stop reason: HOSPADM

## 2019-12-19 RX ORDER — POTASSIUM CHLORIDE 7.45 MG/ML
10 INJECTION INTRAVENOUS
Status: DISCONTINUED | OUTPATIENT
Start: 2019-12-19 | End: 2019-12-20 | Stop reason: HOSPADM

## 2019-12-19 RX ORDER — ATORVASTATIN CALCIUM 20 MG/1
20 TABLET, FILM COATED ORAL DAILY
Status: DISCONTINUED | OUTPATIENT
Start: 2019-12-19 | End: 2019-12-20 | Stop reason: HOSPADM

## 2019-12-19 RX ORDER — LISINOPRIL 2.5 MG/1
5 TABLET ORAL DAILY
Status: CANCELLED | OUTPATIENT
Start: 2019-12-19

## 2019-12-19 RX ORDER — HYDROXYZINE HYDROCHLORIDE 25 MG/1
25 TABLET, FILM COATED ORAL NIGHTLY
Status: DISCONTINUED | OUTPATIENT
Start: 2019-12-19 | End: 2019-12-20 | Stop reason: HOSPADM

## 2019-12-19 RX ADMIN — PANTOPRAZOLE SODIUM 40 MG: 40 TABLET, DELAYED RELEASE ORAL at 05:34

## 2019-12-19 RX ADMIN — APIXABAN 5 MG: 5 TABLET, FILM COATED ORAL at 21:51

## 2019-12-19 RX ADMIN — LISINOPRIL 5 MG: 2.5 TABLET ORAL at 11:50

## 2019-12-19 RX ADMIN — MAGNESIUM SULFATE IN DEXTROSE 1 G: 10 INJECTION, SOLUTION INTRAVENOUS at 04:59

## 2019-12-19 RX ADMIN — ARFORMOTEROL TARTRATE 15 MCG: 15 SOLUTION RESPIRATORY (INHALATION) at 11:39

## 2019-12-19 RX ADMIN — INSULIN ASPART 2 UNITS: 100 INJECTION, SOLUTION INTRAVENOUS; SUBCUTANEOUS at 18:23

## 2019-12-19 RX ADMIN — HEPARIN SODIUM 5000 UNITS: 5000 INJECTION INTRAVENOUS; SUBCUTANEOUS at 05:33

## 2019-12-19 RX ADMIN — BUDESONIDE 0.5 MG: 0.5 INHALANT RESPIRATORY (INHALATION) at 11:40

## 2019-12-19 RX ADMIN — SODIUM CHLORIDE 100 ML/HR: 4.5 INJECTION, SOLUTION INTRAVENOUS at 04:59

## 2019-12-19 RX ADMIN — HYDROXYZINE HYDROCHLORIDE 25 MG: 25 TABLET ORAL at 21:51

## 2019-12-19 RX ADMIN — ATORVASTATIN CALCIUM 20 MG: 20 TABLET, FILM COATED ORAL at 11:50

## 2019-12-19 RX ADMIN — FERROUS SULFATE TAB 325 MG (65 MG ELEMENTAL FE) 325 MG: 325 (65 FE) TAB at 18:22

## 2019-12-19 RX ADMIN — APIXABAN 5 MG: 5 TABLET, FILM COATED ORAL at 11:51

## 2019-12-19 RX ADMIN — FERROUS SULFATE TAB 325 MG (65 MG ELEMENTAL FE) 325 MG: 325 (65 FE) TAB at 11:51

## 2019-12-19 RX ADMIN — BUMETANIDE 1 MG: 1 TABLET ORAL at 11:51

## 2019-12-19 RX ADMIN — INSULIN ASPART 2 UNITS: 100 INJECTION, SOLUTION INTRAVENOUS; SUBCUTANEOUS at 11:52

## 2019-12-19 RX ADMIN — ARFORMOTEROL TARTRATE 15 MCG: 15 SOLUTION RESPIRATORY (INHALATION) at 21:30

## 2019-12-19 RX ADMIN — BUDESONIDE 0.5 MG: 0.5 INHALANT RESPIRATORY (INHALATION) at 21:30

## 2019-12-19 NOTE — PLAN OF CARE
Problem: Nausea/Vomiting (Adult)  Goal: Symptom Relief  Flowsheets (Taken 12/19/2019 0335)  Symptom Relief: making progress toward outcome  Goal: Adequate Hydration  Flowsheets (Taken 12/19/2019 0335)  Adequate Hydration: making progress toward outcome  Intervention: Position to Prevent Aspiration  Flowsheets (Taken 12/19/2019 0200 by Lorena Contreras)  Body Position: position changed independently  Head of Bed (HOB): HOB at 30-45 degrees  Intervention: Promote/Maintain Hydration  Flowsheets (Taken 12/19/2019 0335)  Fluid/Electrolyte Management: fluids provided     Pt has had no N/V throughout the night. Pt has also had no complaints of abdominal pain.

## 2019-12-19 NOTE — PROGRESS NOTES
CC: vomiting    No vomiting overnight.  Patient states abdominal pain improved.  Passing flatus.    AFVSS  RRR  CTAB  S/NT/ND/ periumbilical hernia reduced    75 yo F with N/V and leukocytosis.  I suspect gastroenteritis.  No obstructive symptoms or signs of peritonitis or ischemia.  -advance diet as tolerated  -f/u with surgery to discuss elective hernia repair as outpatient.

## 2019-12-19 NOTE — PLAN OF CARE
Problem: Patient Care Overview  Goal: Plan of Care Review  Outcome: Ongoing (interventions implemented as appropriate)     Mayra has not complained of any nausea or vomiting since yesterday. Pt has no complaints at this time.

## 2019-12-19 NOTE — PROGRESS NOTES
[facility] HOSPITALIST PROGRESS NOTE     Patient Identification:  Name:  Mayra Hays  Age:  74 y.o.  Sex:  female  :  1945  MRN:  [id number]  Visit Number:  [id number]  Primary Care Provider:  Niki Antony APRN    Date of admission: 2019  Length of stay:  0    ----------------------------------------------------------------------------------------------------------------------  Subjective     Chief Complaint:   Chief Complaint   Patient presents with   • Vomiting       Subjective/Interval History:    Patient is a 74 y.o. female who was admitted on 2019 after presenting to the emergency department of [facility] with complaints of intractable vomiting. She had recently been admitted to [facility] with a small bowel obstruction.  She was home for a few days and developed recurrent nausea and vomiting.  She was noted to have an umbilical hernia which was reducible and has been evaluated by surgery, no inpatient intervention needed at this time. For complete admission information, please see history and physical.     Today, the patient reports that she is doing well.  She was advanced to a GI soft diet this morning from n.p.o. status and tolerated it well.  No further nausea, or vomiting.  She denies any abdominal pain.  Discussed with RN, Sia, who denies any new events or concerns.    Review of Systems   Constitutional: Negative for chills and fever.   Respiratory: Negative for shortness of breath.    Cardiovascular: Negative for chest pain.   Gastrointestinal: Negative for abdominal pain, nausea and vomiting.   Genitourinary: Negative for difficulty urinating.      ----------------------------------------------------------------------------------------------------------------------  Objective   [facility] Meds:    apixaban 5 mg Oral Q12H   arformoterol 15 mcg Nebulization BID - RT   atorvastatin 20 mg Oral Daily   budesonide 0.5 mg Nebulization BID  - RT   bumetanide 1 mg Oral Daily   ferrous sulfate 325 mg Oral BID With Meals   hydrOXYzine 25 mg Oral Nightly   insulin aspart 0-7 Units Subcutaneous 4x Daily AC & at Bedtime   ipratropium-albuterol 3 mL Nebulization Q8H - RT   lisinopril 5 mg Oral Daily   metoprolol tartrate 25 mg Oral Q12H   pantoprazole 40 mg Oral Q AM   sodium chloride 10 mL Intravenous Q12H       sodium chloride 100 mL/hr Last Rate: 100 mL/hr (12/19/19 0459)   sodium chloride 125 mL/hr Last Rate: Stopped (12/18/19 1928)     ----------------------------------------------------------------------------------------------------------------------  Vital Signs:  Temp:  [97 °F (36.1 °C)-98.5 °F (36.9 °C)] 98.5 °F (36.9 °C)  Heart Rate:  [63-86] 84  Resp:  [16-18] 18  BP: ()/(48-67) 98/48  Mean Arterial Pressure (Non-Invasive) for the past 24 hrs (Last 3 readings):   Noninvasive MAP (mmHg)   12/19/19 0819 71   12/19/19 0500 79   12/18/19 2300 69     SpO2 Percentage    12/18/19 2300 12/19/19 0708 12/19/19 0819   SpO2: 94% 94% 92%     SpO2:  [90 %-99 %] 92 %  on   ;   Device (Oxygen Therapy): room air    Body mass index is 33.12 kg/m².  Wt Readings from Last 3 Encounters:   12/18/19 98.8 kg (217 lb 12.8 oz)   08/09/19 97.5 kg (215 lb)   07/14/19 95.3 kg (210 lb)        Intake/Output Summary (Last 24 hours) at 12/19/2019 1114  Last data filed at 12/19/2019 0821  Gross per 24 hour   Intake 220 ml   Output no documentation   Net 220 ml     Diet Regular; GI Soft  ----------------------------------------------------------------------------------------------------------------------  Physical exam:  Constitutional: Vital signs reviewed. Well-developed and well-nourished.  No respiratory distress on room air.  HENT:  Head:  Normocephalic and atraumatic.  Mouth:  Moist mucous membranes.    Eyes:  Conjunctivae and EOM are normal. No scleral icterus. No erythema or drainage.  Neck:  Neck supple.  No JVD present.    Cardiovascular:  Normal rate, regular  rhythm and normal heart sounds with no murmur.  Pulmonary/Chest:  No respiratory distress, no wheezes, no crackles, with normal breath sounds and good air movement.  Abdominal:  Soft.  Bowel sounds are normal.  No distension and no tenderness. Umbilicus is non-tender.   Musculoskeletal:  No edema, no tenderness, and no deformity.  No red or swollen joints anywhere.    Neurological:  Alert and oriented to person, place, and time.  No cranial nerve deficit.  No tongue deviation.  No facial droop.  No slurred speech.   Skin:  Skin is warm and dry. No rash noted. No pallor.   Peripheral vascular: No clubbing, no cyanosis, no edema. 2+ pedal pulses bilaterally.   Genitourinary: No montaño catheter in place.     I have reviewed and updated the physical exam as needed to reflect that of 12/19/19  ----------------------------------------------------------------------------------------------------------------------  Tele:  Not on telemetry monitoring.     ----------------------------------------------------------------------------------------------------------------------  Results from last 7 days   Lab Units 12/18/19  1205   TROPONIN T ng/mL <0.010           Results from last 7 days   Lab Units 12/19/19  0323 12/18/19  1810 12/18/19  1243 12/18/19  1205   LACTATE mmol/L  --   --  1.8  --    WBC 10*3/mm3 16.79* 18.51*  --  22.73*   HEMOGLOBIN g/dL 11.3* 11.8*  --  13.0   HEMATOCRIT % 36.4 37.1  --  39.7   MCV fL 88.1 86.5  --  86.1   MCHC g/dL 31.0* 31.8  --  32.7   PLATELETS 10*3/mm3 210 239  --  264     Results from last 7 days   Lab Units 12/19/19  0323 12/18/19  1810 12/18/19  1205   SODIUM mmol/L 136 139 140   POTASSIUM mmol/L 4.1 3.9 4.1   MAGNESIUM mg/dL 1.6 1.7  --    CHLORIDE mmol/L 98 97* 98   CO2 mmol/L 26.6 30.3* 26.5   BUN mg/dL 21 23 22   CREATININE mg/dL 1.22* 1.30* 1.52*   EGFR IF NONAFRICN AM mL/min/1.73 43* 40* 34*   CALCIUM mg/dL 8.6 9.2 9.8   GLUCOSE mg/dL 135* 172* 243*   ALBUMIN g/dL  --   --  4.16    BILIRUBIN mg/dL  --   --  0.3   ALK PHOS U/L  --   --  140*   AST (SGOT) U/L  --   --  15   ALT (SGPT) U/L  --   --  15   Estimated Creatinine Clearance: 50.5 mL/min (A) (by C-G formula based on SCr of 1.22 mg/dL (H)).  No results found for: AMMONIA    Glucose   Date/Time Value Ref Range Status   12/19/2019 0725 149 (H) 70 - 130 mg/dL Final   12/18/2019 1939 157 (H) 70 - 130 mg/dL Final     Lab Results   Component Value Date    HGBA1C 6.80 (H) 12/13/2018     Lab Results   Component Value Date    TSH 2.670 12/18/2019    FREET4 1.01 12/18/2019     No results found for: BLOODCX  No results found for: URINECX  No results found for: WOUNDCX  No results found for: STOOLCX  No results found for: RESPCX    Pain Management Panel     Pain Management Panel Latest Ref Rng & Units 5/30/2017    AMPHETAMINES SCREEN, URINE Negative Negative    BARBITURATES SCREEN Negative Negative    BENZODIAZEPINE SCREEN, URINE Negative Negative    BUPRENORPHINEUR Negative Negative    COCAINE SCREEN, URINE Negative Negative    METHADONE SCREEN, URINE Negative Negative        I have personally reviewed the above laboratory results for 12/19/19  ----------------------------------------------------------------------------------------------------------------------  Imaging Results (Last 24 Hours)     Procedure Component Value Units Date/Time    XR Chest 1 View [[id number]] Collected:  12/18/19 1201     Updated:  12/18/19 1419    Narrative:       XR CHEST 1 VW-     CLINICAL INDICATION: vomiting, weakness        COMPARISON: 06/24/2018      TECHNIQUE: Single frontal view of the chest.     FINDINGS:     There is no focal alveolar infiltrate or effusion.  The cardiac silhouette is normal. The pulmonary vasculature is  unremarkable.  There is no evidence of an acute osseous abnormality.   There are no suspicious-appearing parenchymal soft tissue nodules.          Impression:       No evidence of active or acute cardiopulmonary disease on today's  chest  radiograph.     This report was finalized on 12/18/2019 12:01 PM by Dr. Mauricio Santiago MD.       CT Abdomen Pelvis Without Contrast [[id number]] Collected:  12/18/19 1348     Updated:  12/18/19 1419    Narrative:       CT ABDOMEN PELVIS WO CONTRAST-     CLINICAL INDICATION: abd pain        COMPARISON: 11/07/2018     TECHNIQUE: Axial images were acquired from the lung bases through the  pubic symphysis without any IV or oral contrast.  Reformatted images were created in both the coronal and sagittal planes.     Radiation dose reduction techniques were utilized per ALARA protocol.  Automated exposure control was initiated through either or [facility] or  [employer] software packages by  protocol.           FINDINGS:   The lung bases are clear. There are no pleural effusions.     The liver is homogeneous. There is no evidence of focal hepatic mass     The spleen is homogeneous     There is no peripancreatic stranding or pancreatic head mass.     There is no adrenal enlargement.     The kidneys show no evidence of hydronephrosis or hydroureter. I do not  see any distal ureteral stones.      Otherwise I do not see any free fluid or walled off fluid collections.     There is a bowel containing periumbilical hernia. No evidence of bowel  obstruction.     There is no evidence of mesenteric or retroperitoneal adenopathy             Impression:       : Periumbilical hernia containing bowel              This report was finalized on 12/18/2019 1:50 PM by Dr. Mauricio Santiago MD.           I have personally reviewed the above radiology results.   ----------------------------------------------------------------------------------------------------------------------  Assessment/Plan     -Recurrent nausea and vomiting: Likely related to viral gastroenteritis.  Improved.  Patient was placed on GI soft diet this morning and tolerated it well.  Continue to monitor and advance diet as tolerated.  Continue IV fluids, but will  reduce the rate to 75 cc/h.  Repeat BMP in a.m.  Correct electrolytes as needed. Consult PT.     -Leukocytosis, likely from SIRS secondary to gastroenteritis: WBC count is trending down.  Repeat CBC in a.m.     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  Admission creatinine 1.52.  Improved with IV fluids.  Hold bumetanide and lisinopril.  Continue light IV fluids and repeat BMP in a.m.    -Essential hypertension: Borderline hypotensive since admission.  Hold bumetanide and lisinopril.  We will continue home metoprolol given history of paroxysmal atrial fibrillation, but will reduce the dose to 12.5 mg twice daily given borderline hypotension.  Monitor for improvement.  Continue light IV fluids.     -Acute hypomagnesemia: Replace per protocol. Repeat level in AM.     -Prolonged QTc, 479ms: Replace magnesium. Telemetry added. Avoid QT prolonging agents.     -Dyslipidemia: Home statin resumed.    -Diabetes type 2 non-insulin-requiring, uncontrolled with mild hyperglycemia: Check hemoglobin A1c level.  Consult diabetic education.  Scale insulin and adjust as needed.    -COPD/chronic bronchitis with no exacerbation: Continue nebulizers.    -History of CVA with no residual deficit.     -Chronic anticoagulation for stroke prophylaxis    -History of paroxysmal atrial fibrillation: In a sinus rhythm on admission EKG. chronically anticoagulated with Eliquis for stroke prevention.  Monitor and correct electrolytes as needed.  Home metoprolol resumed at a lower dose given borderline hypotension.  Add telemetry monitoring.     -History of thyroid disorder take likely hyperthyroid patient is on methimazole, her free T4 and TSH is normal at this time.    -Reducible umbilical hernia: General surgery consulted and has evaluated the patient.  No need for inpatient surgery as the periumbilical hernia has been reduced.  Follow-up with surgery as an outpatient for possible elective hernia  repair.    -Obesity with a BMI of 34.1, with associated hypertension, dyslipidemia, and type 2 diabetes mellitus.    DVT Prophylaxis: Chronically anticoagulated with Eliquis.    The patient is high risk due to the following diagnoses/reasons: Nausea/vomiting, DONNIE, electrolyte abnormalities.     I have discussed the patient's assessment and plan with the patient, RN, Sia, and attending physician, Dr. Nino.     Disposition: We will continue to advance diet today and if tolerated, will plan for discharge home in a..    NABOR Rodríguez  12/19/19  11:14 AM

## 2019-12-20 VITALS
HEIGHT: 68 IN | BODY MASS INDEX: 33.01 KG/M2 | DIASTOLIC BLOOD PRESSURE: 70 MMHG | RESPIRATION RATE: 18 BRPM | OXYGEN SATURATION: 98 % | TEMPERATURE: 97.9 F | WEIGHT: 217.8 LBS | HEART RATE: 76 BPM | SYSTOLIC BLOOD PRESSURE: 123 MMHG

## 2019-12-20 LAB
ANION GAP SERPL CALCULATED.3IONS-SCNC: 8.8 MMOL/L (ref 5–15)
BACTERIA SPEC AEROBE CULT: ABNORMAL
BASOPHILS # BLD AUTO: 0.05 10*3/MM3 (ref 0–0.2)
BASOPHILS NFR BLD AUTO: 0.5 % (ref 0–1.5)
BUN BLD-MCNC: 13 MG/DL (ref 8–23)
BUN/CREAT SERPL: 13.7 (ref 7–25)
CALCIUM SPEC-SCNC: 8.6 MG/DL (ref 8.6–10.5)
CHLORIDE SERPL-SCNC: 102 MMOL/L (ref 98–107)
CO2 SERPL-SCNC: 28.2 MMOL/L (ref 22–29)
CREAT BLD-MCNC: 0.95 MG/DL (ref 0.57–1)
DEPRECATED RDW RBC AUTO: 42.3 FL (ref 37–54)
EOSINOPHIL # BLD AUTO: 0.2 10*3/MM3 (ref 0–0.4)
EOSINOPHIL NFR BLD AUTO: 2 % (ref 0.3–6.2)
ERYTHROCYTE [DISTWIDTH] IN BLOOD BY AUTOMATED COUNT: 12.8 % (ref 12.3–15.4)
GFR SERPL CREATININE-BSD FRML MDRD: 58 ML/MIN/1.73
GLUCOSE BLD-MCNC: 129 MG/DL (ref 65–99)
GLUCOSE BLDC GLUCOMTR-MCNC: 171 MG/DL (ref 70–130)
GLUCOSE BLDC GLUCOMTR-MCNC: 259 MG/DL (ref 70–130)
HCT VFR BLD AUTO: 34.5 % (ref 34–46.6)
HGB BLD-MCNC: 10.7 G/DL (ref 12–15.9)
IMM GRANULOCYTES # BLD AUTO: 0.04 10*3/MM3 (ref 0–0.05)
IMM GRANULOCYTES NFR BLD AUTO: 0.4 % (ref 0–0.5)
LYMPHOCYTES # BLD AUTO: 1.83 10*3/MM3 (ref 0.7–3.1)
LYMPHOCYTES NFR BLD AUTO: 18 % (ref 19.6–45.3)
MAGNESIUM SERPL-MCNC: 1.9 MG/DL (ref 1.6–2.4)
MCH RBC QN AUTO: 27.7 PG (ref 26.6–33)
MCHC RBC AUTO-ENTMCNC: 31 G/DL (ref 31.5–35.7)
MCV RBC AUTO: 89.4 FL (ref 79–97)
MONOCYTES # BLD AUTO: 0.98 10*3/MM3 (ref 0.1–0.9)
MONOCYTES NFR BLD AUTO: 9.7 % (ref 5–12)
NEUTROPHILS # BLD AUTO: 7.04 10*3/MM3 (ref 1.7–7)
NEUTROPHILS NFR BLD AUTO: 69.4 % (ref 42.7–76)
NRBC BLD AUTO-RTO: 0 /100 WBC (ref 0–0.2)
PHOSPHATE SERPL-MCNC: 3.2 MG/DL (ref 2.5–4.5)
PLATELET # BLD AUTO: 190 10*3/MM3 (ref 140–450)
PMV BLD AUTO: 11.2 FL (ref 6–12)
POTASSIUM BLD-SCNC: 4.2 MMOL/L (ref 3.5–5.2)
RBC # BLD AUTO: 3.86 10*6/MM3 (ref 3.77–5.28)
SODIUM BLD-SCNC: 139 MMOL/L (ref 136–145)
WBC NRBC COR # BLD: 10.14 10*3/MM3 (ref 3.4–10.8)

## 2019-12-20 PROCEDURE — 96366 THER/PROPH/DIAG IV INF ADDON: CPT

## 2019-12-20 PROCEDURE — 94799 UNLISTED PULMONARY SVC/PX: CPT

## 2019-12-20 PROCEDURE — 80048 BASIC METABOLIC PNL TOTAL CA: CPT | Performed by: HOSPITALIST

## 2019-12-20 PROCEDURE — 99217 PR OBSERVATION CARE DISCHARGE MANAGEMENT: CPT | Performed by: PHYSICIAN ASSISTANT

## 2019-12-20 PROCEDURE — G0378 HOSPITAL OBSERVATION PER HR: HCPCS

## 2019-12-20 PROCEDURE — 84100 ASSAY OF PHOSPHORUS: CPT | Performed by: HOSPITALIST

## 2019-12-20 PROCEDURE — 96361 HYDRATE IV INFUSION ADD-ON: CPT

## 2019-12-20 PROCEDURE — 82962 GLUCOSE BLOOD TEST: CPT

## 2019-12-20 PROCEDURE — 85025 COMPLETE CBC W/AUTO DIFF WBC: CPT | Performed by: HOSPITALIST

## 2019-12-20 PROCEDURE — 63710000001 INSULIN ASPART PER 5 UNITS: Performed by: HOSPITALIST

## 2019-12-20 PROCEDURE — 83735 ASSAY OF MAGNESIUM: CPT | Performed by: HOSPITALIST

## 2019-12-20 PROCEDURE — 25010000002 MAGNESIUM SULFATE IN D5W 1G/100ML (PREMIX) 1-5 GM/100ML-% SOLUTION: Performed by: PHYSICIAN ASSISTANT

## 2019-12-20 PROCEDURE — G0108 DIAB MANAGE TRN  PER INDIV: HCPCS

## 2019-12-20 RX ORDER — BISACODYL 10 MG
10 SUPPOSITORY, RECTAL RECTAL DAILY
Status: DISCONTINUED | OUTPATIENT
Start: 2019-12-20 | End: 2019-12-20 | Stop reason: HOSPADM

## 2019-12-20 RX ORDER — POTASSIUM CHLORIDE 20 MEQ/1
TABLET, EXTENDED RELEASE ORAL
Status: ON HOLD
Start: 2019-12-20 | End: 2020-05-13

## 2019-12-20 RX ORDER — CEFDINIR 300 MG/1
300 CAPSULE ORAL 2 TIMES DAILY
Qty: 9 CAPSULE | Refills: 0 | Status: SHIPPED | OUTPATIENT
Start: 2019-12-20 | End: 2019-12-25

## 2019-12-20 RX ORDER — BISACODYL 10 MG
10 SUPPOSITORY, RECTAL RECTAL DAILY
Status: DISCONTINUED | OUTPATIENT
Start: 2019-12-20 | End: 2019-12-20

## 2019-12-20 RX ORDER — LACTULOSE 10 G/15ML
30 SOLUTION ORAL DAILY
Status: DISCONTINUED | OUTPATIENT
Start: 2019-12-20 | End: 2019-12-20

## 2019-12-20 RX ORDER — BUMETANIDE 1 MG/1
TABLET ORAL
Status: ON HOLD
Start: 2019-12-20 | End: 2020-05-13

## 2019-12-20 RX ORDER — PROMETHAZINE HYDROCHLORIDE 12.5 MG/1
12.5 TABLET ORAL EVERY 6 HOURS PRN
Qty: 12 TABLET | Refills: 0 | Status: ON HOLD | OUTPATIENT
Start: 2019-12-20 | End: 2020-05-13

## 2019-12-20 RX ORDER — LACTULOSE 10 G/15ML
30 SOLUTION ORAL DAILY
Status: DISCONTINUED | OUTPATIENT
Start: 2019-12-20 | End: 2019-12-20 | Stop reason: HOSPADM

## 2019-12-20 RX ORDER — CEFDINIR 300 MG/1
300 CAPSULE ORAL EVERY 12 HOURS SCHEDULED
Status: DISCONTINUED | OUTPATIENT
Start: 2019-12-20 | End: 2019-12-20 | Stop reason: HOSPADM

## 2019-12-20 RX ORDER — MAGNESIUM SULFATE 1 G/100ML
1 INJECTION INTRAVENOUS ONCE
Status: COMPLETED | OUTPATIENT
Start: 2019-12-20 | End: 2019-12-20

## 2019-12-20 RX ADMIN — FERROUS SULFATE TAB 325 MG (65 MG ELEMENTAL FE) 325 MG: 325 (65 FE) TAB at 10:38

## 2019-12-20 RX ADMIN — BUDESONIDE 0.5 MG: 0.5 INHALANT RESPIRATORY (INHALATION) at 09:37

## 2019-12-20 RX ADMIN — IPRATROPIUM BROMIDE AND ALBUTEROL SULFATE 3 ML: 2.5; .5 SOLUTION RESPIRATORY (INHALATION) at 07:32

## 2019-12-20 RX ADMIN — SODIUM CHLORIDE 75 ML/HR: 9 INJECTION, SOLUTION INTRAVENOUS at 01:12

## 2019-12-20 RX ADMIN — ARFORMOTEROL TARTRATE 15 MCG: 15 SOLUTION RESPIRATORY (INHALATION) at 09:37

## 2019-12-20 RX ADMIN — PANTOPRAZOLE SODIUM 40 MG: 40 TABLET, DELAYED RELEASE ORAL at 05:22

## 2019-12-20 RX ADMIN — IPRATROPIUM BROMIDE AND ALBUTEROL SULFATE 3 ML: 2.5; .5 SOLUTION RESPIRATORY (INHALATION) at 14:38

## 2019-12-20 RX ADMIN — MAGNESIUM SULFATE IN DEXTROSE 1 G: 10 INJECTION, SOLUTION INTRAVENOUS at 05:22

## 2019-12-20 RX ADMIN — METOPROLOL TARTRATE 12.5 MG: 25 TABLET, FILM COATED ORAL at 10:39

## 2019-12-20 RX ADMIN — LACTULOSE 30 G: 10 SOLUTION ORAL at 10:38

## 2019-12-20 RX ADMIN — ATORVASTATIN CALCIUM 20 MG: 20 TABLET, FILM COATED ORAL at 10:38

## 2019-12-20 RX ADMIN — CEFDINIR 300 MG: 300 CAPSULE ORAL at 10:38

## 2019-12-20 RX ADMIN — INSULIN ASPART 2 UNITS: 100 INJECTION, SOLUTION INTRAVENOUS; SUBCUTANEOUS at 10:35

## 2019-12-20 RX ADMIN — APIXABAN 5 MG: 5 TABLET, FILM COATED ORAL at 10:38

## 2019-12-20 RX ADMIN — INSULIN ASPART 4 UNITS: 100 INJECTION, SOLUTION INTRAVENOUS; SUBCUTANEOUS at 12:53

## 2019-12-20 NOTE — PHARMACY PATIENT ASSISTANCE
Pharmacy contacted patient's home pharmacy regarding Eliquis 5 mg. Patient is regularly filling a 30-day supply with a $3.80 co-pay. No further issues noted at this time.     Thank you,   Vera Carvalho, Pharmacy Intern  12/20/19  10:42 AM

## 2019-12-20 NOTE — CONSULTS
Ms. Hays is resting in bed, made her aware of her A1C of 6.6.  She reports she has been sick recently.  She has a history of stroke in the past. Counseled patient on diabetes basic handout which discusses exactly what diabetes is and how it affects the body.  Counseled patient on complications of hyperglycemia and ways to prevent it.  Counseled patient on hypoglycemia and treatment by using the rule of 15.  Counseled patient on the importance of checking blood glucose levels frequently and keeping a log to take to all MD appointments.  Counseled patient on how to care for themselves during sick day.  Stressed the importance of healthy eating with a limit of carbohydrates to 180 per day.  Counseled on importance of complying with medications as ordered by provider.  Counseled on the importance of daily foot inspections.  Counseled patient to seek medical attention if blood glucose above 300.  Counseled on importance of daily exercise. Patient verbalizes understanding of all information given. Encouraged patient to attend an outpatient diabetes smart class or attend diabetes support group.  Left time and dates of classes with patient along with my name and contact information, will continue to monitor patient as needed.

## 2019-12-20 NOTE — PLAN OF CARE
Problem: Nausea/Vomiting (Adult)  Intervention: Minimize Nausea Triggers/Manage Symptoms  Flowsheets (Taken 12/19/2019 0725 by Sia Collazo, RN)  Nausea/Vomiting Interventions: -- (denies)  Intervention: Position to Prevent Aspiration  Flowsheets (Taken 12/20/2019 0200 by Lorena Contreras)  Body Position: position changed independently  Head of Bed (HOB): HOB at 30-45 degrees  Intervention: Promote/Maintain Hydration  Flowsheets (Taken 12/19/2019 0725 by Sia Collazo, RN)  Fluid/Electrolyte Management: fluids provided     Problem: Fall Risk (Adult)  Intervention: Monitor/Assist with Self Care  Flowsheets (Taken 12/19/2019 2000 by Lorena Contrears)  Activity Assistance Provided: independent  Intervention: Reduce Risk/Promote Restraint Free Environment  Flowsheets (Taken 12/20/2019 0200 by Lorena Contreras)  Safety Promotion/Fall Prevention: safety round/check completed  Environmental Safety Modification: assistive device/personal items within reach;clutter free environment maintained;room organization consistent  Safety/Security Measures: bed alarm refused  Intervention: Review Medications/Identify Contributors to Fall Risk  Flowsheets (Taken 12/20/2019 0100)  Medication Review/Management: medications reviewed

## 2019-12-20 NOTE — DISCHARGE SUMMARY
[facility] HOSPITALIST DISCHARGE SUMMARY    Patient Identification:  Name:  Mayra Hays  Age:  74 y.o.  Sex:  female  :  1945  MRN:  [id number]  Visit Number:  [id number]    Date of Admission: 2019  Date of Discharge: 2019    PCP: Niki Antony APRN    Discharging Provider: NABOR Rodríguez    Discharge Diagnoses     Discharge Diagnoses:  1. Persistent nausea/vomiting, likely secondary to viral gastroenteritis  2. SIRS, secondary to above  3. Reducible umbilical hernia  4. 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 Hypotension  6. Acute hypomagnesemia  7. Prolonged QTC, 479 ms  8. Type 2 diabetes mellitus with mild hyperglycemia    Secondary Diagnoses:  1. Essential hypertension   2. Dyslipidemia  3. COPD without acute exacerbation  4. History of CVA without reported residual deficit  5. Paroxysmal atrial fibrillation  6. Chronic anticoagulation with Eliquis  7. Hyperthyroidism  8. Obesity, BMI 34.1    Consults/Procedures     Consults:   Consults     Date and Time Order Name Status Description    2019 1754 Inpatient General Surgery Consult      2019 1519 Surgery (on-call MD unless specified)      2019 1519 Hospitalist (on-call MD unless specified)          Procedures Performed:  1. CT abdomen and pelvis without contrast    History of Presenting Illness     Chief Complaint   Patient presents with   • Vomiting     Ms. Hays is a 74 y.o. female presented to [facility] complaining of persistent nausea and vomiting.  She had reported that she was just discharged from [facility] 4 days prior to presenting to our emergency department.  At [place], she was reported to have been diagnosed with a possible small bowel obstruction which was treated conservatively.  She was discharged home and developed recurrent nausea and vomiting.  While in our ED, she had a CT of the abdomen and pelvis showing periumbilical hernia containing bowel.  General surgery was  consulted and was able to reduce the hernia at bedside.  Due to persistent nausea and vomiting, she was admitted to the observation unit for further monitoring and therapy. Please see the admitting history and physical for further details.      Hospital Course     At admission, she was placed on IV fluids as well as a clear liquid diet and PRN antiemetics.  She was noted to have some mild DONNIE stage II-III CKD which was felt to be secondary to dehydration from GI losses.  This quickly improved with IV fluids.  She met SIRS criteria on admission without overt signs of infection.  It was felt to be secondary to likely viral gastroenteritis causing her persistent nausea and vomiting.  WBC count did improve.  Electrolytes were replaced as needed.     The patient reported that she was feeling better.  Nausea and vomiting resolved.  Her diet was gradually advanced and tolerated.  General surgery continue to follow and recommended outpatient follow-up for possible elective umbilical hernia repair.  Her to discharge, her UA obtained at admission did grow positive for 25,000 CFU per mL E. coli.  This was discussed with the patient.  She reported that she had been having some urinary symptoms prior to admission and had been taking some unknown antibiotic and had not completed it.  She was therefore discharged home on Omnicef 300 mg twice daily x5 days.  Her home Bumex was changed from scheduled to PRN.  We will continue to hold metformin and lisinopril until follow-up with PCP.  She was also given a prescription for Symbicort and PRN Phenergan.  Follow-up with PCP in 1 week.  Follow-up with general surgery in 3 weeks.     Discharge Vitals/Physical Examination     Vital Signs:  Temp:  [97.5 °F (36.4 °C)-98.7 °F (37.1 °C)] 97.5 °F (36.4 °C)  Heart Rate:  [67-95] 74  Resp:  [18-20] 18  BP: ()/(57-67) 99/60  Mean Arterial Pressure (Non-Invasive) for the past 24 hrs (Last 3 readings):   Noninvasive MAP (mmHg)   12/20/19 4900  76   12/20/19 0109 77   12/19/19 2055 77     SpO2 Percentage    12/20/19 0347 12/20/19 0732 12/20/19 0742   SpO2: 95% 99% 99%     SpO2:  [92 %-100 %] 99 %  on  Flow (L/min):  [2] 2;   Device (Oxygen Therapy): room air    Body mass index is 33.12 kg/m².  Wt Readings from Last 3 Encounters:   12/18/19 98.8 kg (217 lb 12.8 oz)   08/09/19 97.5 kg (215 lb)   07/14/19 95.3 kg (210 lb)       Physical Exam:  Physical Exam   Constitutional: She is oriented to person, place, and time. She appears well-developed and well-nourished. No distress.   HENT:   Head: Normocephalic and atraumatic.   Eyes: Conjunctivae are normal. Right eye exhibits no discharge. Left eye exhibits no discharge. No scleral icterus.   Neck: Normal range of motion. Neck supple. Carotid bruit is not present.   Cardiovascular: Normal rate, regular rhythm and normal heart sounds. Exam reveals no gallop and no friction rub.   No murmur heard.  Pulmonary/Chest: Effort normal and breath sounds normal. No respiratory distress. She has no wheezes. She has no rales. She exhibits no tenderness.   Abdominal: Soft. Bowel sounds are normal. She exhibits no distension and no mass. There is no tenderness. There is no rebound and no guarding. No hernia.   Musculoskeletal: Normal range of motion. She exhibits no edema.   Neurological: She is alert and oriented to person, place, and time.   Skin: Skin is warm and dry. No rash noted. She is not diaphoretic. No erythema. No pallor.   Psychiatric: She has a normal mood and affect. Her behavior is normal.   Nursing note and vitals reviewed.      Pertinent Laboratory/Radiology Results     Pertinent Laboratory Results:  Results from last 7 days   Lab Units 12/18/19  1205   TROPONIN T ng/mL <0.010           Results from last 7 days   Lab Units 12/20/19  0340 12/19/19  0323 12/18/19  1810 12/18/19  1243   LACTATE mmol/L  --   --   --  1.8   WBC 10*3/mm3 10.14 16.79* 18.51*  --    HEMOGLOBIN g/dL 10.7* 11.3* 11.8*  --    HEMATOCRIT  % 34.5 36.4 37.1  --    MCV fL 89.4 88.1 86.5  --    MCHC g/dL 31.0* 31.0* 31.8  --    PLATELETS 10*3/mm3 190 210 239  --      Results from last 7 days   Lab Units 12/20/19  0340 12/19/19  0323 12/18/19  1810 12/18/19  1205   SODIUM mmol/L 139 136 139 140   POTASSIUM mmol/L 4.2 4.1 3.9 4.1   MAGNESIUM mg/dL 1.9 1.6 1.7  --    CHLORIDE mmol/L 102 98 97* 98   CO2 mmol/L 28.2 26.6 30.3* 26.5   BUN mg/dL 13 21 23 22   CREATININE mg/dL 0.95 1.22* 1.30* 1.52*   EGFR IF NONAFRICN AM mL/min/1.73 58* 43* 40* 34*   CALCIUM mg/dL 8.6 8.6 9.2 9.8   GLUCOSE mg/dL 129* 135* 172* 243*   ALBUMIN g/dL  --   --   --  4.16   BILIRUBIN mg/dL  --   --   --  0.3   ALK PHOS U/L  --   --   --  140*   AST (SGOT) U/L  --   --   --  15   ALT (SGPT) U/L  --   --   --  15   Estimated Creatinine Clearance: 64.9 mL/min (by C-G formula based on SCr of 0.95 mg/dL).  No results found for: AMMONIA    Hemoglobin A1C   Date/Time Value Ref Range Status   12/19/2019 0323 6.60 (H) 4.80 - 5.60 % Final     Glucose   Date/Time Value Ref Range Status   12/20/2019 0640 171 (H) 70 - 130 mg/dL Final   12/19/2019 2135 146 (H) 70 - 130 mg/dL Final   12/19/2019 1805 199 (H) 70 - 130 mg/dL Final   12/19/2019 1108 169 (H) 70 - 130 mg/dL Final   12/19/2019 0725 149 (H) 70 - 130 mg/dL Final   12/18/2019 1939 157 (H) 70 - 130 mg/dL Final     Lab Results   Component Value Date    HGBA1C 6.60 (H) 12/19/2019     Lab Results   Component Value Date    TSH 2.670 12/18/2019    FREET4 1.01 12/18/2019       Blood Culture   Date Value Ref Range Status   12/18/2019 No growth at 24 hours  Preliminary   12/18/2019 No growth at 24 hours  Preliminary   12/18/2019 No growth at 24 hours  Preliminary     Urine Culture   Date Value Ref Range Status   12/18/2019 25,000 CFU/mL Escherichia coli (A)  Final     No results found for: WOUNDCX  No results found for: STOOLCX  No results found for: RESPCX  Pain Management Panel     Pain Management Panel Latest Ref Rng & Units 5/30/2017     AMPHETAMINES SCREEN, URINE Negative Negative    BARBITURATES SCREEN Negative Negative    BENZODIAZEPINE SCREEN, URINE Negative Negative    BUPRENORPHINEUR Negative Negative    COCAINE SCREEN, URINE Negative Negative    METHADONE SCREEN, URINE Negative Negative          Pertinent Radiology Results:  Imaging Results (All)     Procedure Component Value Units Date/Time    XR Chest 1 View [[id number]] Collected:  12/18/19 1201     Updated:  12/18/19 1419    Narrative:       XR CHEST 1 VW-     CLINICAL INDICATION: vomiting, weakness        COMPARISON: 06/24/2018      TECHNIQUE: Single frontal view of the chest.     FINDINGS:     There is no focal alveolar infiltrate or effusion.  The cardiac silhouette is normal. The pulmonary vasculature is  unremarkable.  There is no evidence of an acute osseous abnormality.   There are no suspicious-appearing parenchymal soft tissue nodules.          Impression:       No evidence of active or acute cardiopulmonary disease on today's chest  radiograph.     This report was finalized on 12/18/2019 12:01 PM by Dr. Mauricio Santiago MD.       CT Abdomen Pelvis Without Contrast [[id number]] Collected:  12/18/19 1348     Updated:  12/18/19 1419    Narrative:       CT ABDOMEN PELVIS WO CONTRAST-     CLINICAL INDICATION: abd pain        COMPARISON: 11/07/2018     TECHNIQUE: Axial images were acquired from the lung bases through the  pubic symphysis without any IV or oral contrast.  Reformatted images were created in both the coronal and sagittal planes.     Radiation dose reduction techniques were utilized per ALARA protocol.  Automated exposure control was initiated through either or CareDose or  DoseRight software packages by  protocol.           FINDINGS:   The lung bases are clear. There are no pleural effusions.     The liver is homogeneous. There is no evidence of focal hepatic mass     The spleen is homogeneous     There is no peripancreatic stranding or pancreatic head mass.      There is no adrenal enlargement.     The kidneys show no evidence of hydronephrosis or hydroureter. I do not  see any distal ureteral stones.      Otherwise I do not see any free fluid or walled off fluid collections.     There is a bowel containing periumbilical hernia. No evidence of bowel  obstruction.     There is no evidence of mesenteric or retroperitoneal adenopathy             Impression:       : Periumbilical hernia containing bowel              This report was finalized on 12/18/2019 1:50 PM by Dr. Mauricio Santiago MD.             Test Results Pending at Discharge:   Order Current Status    Blood Culture - Blood, Arm, Left Preliminary result    Blood Culture - Blood, Arm, Left Preliminary result    Blood Culture - Blood, Hand, Left Preliminary result          Discharge Disposition/Discharge Medications/Discharge Appointments     Discharge Disposition:   Home or Self Care    Condition at Discharge:  Stable.     Discharge Diet:  Diet Instructions     Advance Diet As Tolerated      Diet: Consistent Carbohydrate      Discharge Diet:  Consistent Carbohydrate          Discharge Activity:  Activity Instructions     Activity as Tolerated            Code Status While Inpatient:  Code Status and Medical Interventions:   Ordered at: 12/18/19 1610     Level Of Support Discussed With:    Patient     Code Status:    CPR     Medical Interventions (Level of Support Prior to Arrest):    Full       Discharge Medications:     Discharge Medications      New Medications      Instructions Start Date   budesonide-formoterol 160-4.5 MCG/ACT inhaler  Commonly known as:  SYMBICORT   2 puffs, Inhalation, 2 Times Daily - RT      promethazine 12.5 MG tablet  Commonly known as:  PHENERGAN   12.5 mg, Oral, Every 6 Hours PRN         Changes to Medications      Instructions Start Date   bumetanide 1 MG tablet  Commonly known as:  BUMEX  What changed:    · how much to take  · how to take this  · when to take this  · additional  instructions   Daily as needed for increased swelling or shortness of breath until follow up with PCP.      potassium chloride 20 MEQ CR tablet  Commonly known as:  K-DUR,KLOR-JESUSITA  What changed:    · how much to take  · how to take this  · when to take this  · additional instructions   Daily as needed with Bumex only.         Continue These Medications      Instructions Start Date   albuterol sulfate  (90 Base) MCG/ACT inhaler  Commonly known as:  PROVENTIL HFA;VENTOLIN HFA;PROAIR HFA   2 puffs, Inhalation, Every 4 Hours PRN      apixaban 5 MG tablet tablet  Commonly known as:  ELIQUIS   5 mg, Oral, 2 Times Daily      atorvastatin 20 MG tablet  Commonly known as:  LIPITOR   20 mg, Oral, Daily      ferrous sulfate 325 (65 FE) MG tablet   325 mg, Oral, 2 Times Daily      hydrOXYzine 25 MG tablet  Commonly known as:  ATARAX   25 mg, Oral, Nightly      pantoprazole 40 MG EC tablet  Commonly known as:  PROTONIX   40 mg, Oral, Daily      tiotropium bromide monohydrate 2.5 MCG/ACT aerosol solution inhaler  Commonly known as:  SPIRIVA RESPIMAT   1 puff, Inhalation, Daily - RT         Stop These Medications    furosemide 20 MG tablet  Commonly known as:  LASIX     lisinopril 5 MG tablet  Commonly known as:  PRINIVIL,ZESTRIL     metoprolol tartrate 50 MG tablet  Commonly known as:  LOPRESSOR            Discharge Appointments:  Your Scheduled Appointments    Car 15, 2020  1:30 PM EST  Follow Up with ERASTO Mckeon  [facility] (--) [address]  [place] [id number] [phone]   Arrive 15 minutes prior to appointment.       Feb 10, 2020  9:30 AM EST  Follow Up with Darek Nelson PA-C  [facility] (--) [address]  [place] [address] [phone]   Arrive 15 minutes prior to appointment.              Additional Instructions for the Follow-ups that You Need to Schedule     Call MD With Problems / Concerns   As directed       Instructions: Contact PCP or return to the ED with recurrent symptoms or concerns.    Order Comments:  Instructions: Contact PCP or return to the ED with recurrent symptoms or concerns.          Discharge Follow-up with PCP   As directed       Currently Documented PCP:    Niki Antony APRN    PCP Phone Number:    [phone]     Follow Up Details:  1 week.         Discharge Follow-up with Specified Provider: Dr. Velásquez; 3 Weeks   As directed      To:  Dr. Velásquez    Follow Up:  3 Weeks    Follow Up Details:  Follow up hernia.           Follow-up Information     Niki Antony APRN .    Specialty:  Nurse Practitioner  Why:  1 week.  Contact information:  [address] [address]  [phone]                   Additional Instructions for the Follow-ups that You Need to Schedule     Call MD With Problems / Concerns   As directed      Instructions: Contact PCP or return to the ED with recurrent symptoms or concerns.    Order Comments:  Instructions: Contact PCP or return to the ED with recurrent symptoms or concerns.          Discharge Follow-up with PCP   As directed       Currently Documented PCP:    Niki Antony APRN    PCP Phone Number:    [phone]     Follow Up Details:  1 week.         Discharge Follow-up with Specified Provider: Dr. Velásquez; 3 Weeks   As directed      To:  Dr. Velásquez    Follow Up:  3 Weeks    Follow Up Details:  Follow up hernia.               Attestation: I personally discussed the patient's hospital course, disposition, discharge planning, and discharge medications with attending physician, Dr. Nino, Tara Santana MD, prior to time of discharge. I have also discussed with RN, Tigist, prior to discharge.       NABOR Rodríguez  12/20/19  8:34 AM    Time discharge orders placed: 0832.     Time to complete discharge: >30 minutes.     Please send a copy of this dictation to the following providers:  Niki Antony APRN

## 2019-12-21 ENCOUNTER — READMISSION MANAGEMENT (OUTPATIENT)
Dept: CALL CENTER | Facility: HOSPITAL | Age: 74
End: 2019-12-21

## 2019-12-21 NOTE — OUTREACH NOTE
Prep Survey      Responses   Facility patient discharged from?  [facility]   Is patient eligible?  Yes   Discharge diagnosis  Nausea & vomiting    Does the patient have one of the following disease processes/diagnoses(primary or secondary)?  Other   Does the patient have Home health ordered?  No   Is there a DME ordered?  No   Prep survey completed?  Yes          Slime Lin RN

## 2019-12-23 LAB
BACTERIA SPEC AEROBE CULT: NORMAL

## 2019-12-24 ENCOUNTER — READMISSION MANAGEMENT (OUTPATIENT)
Dept: CALL CENTER | Facility: HOSPITAL | Age: 74
End: 2019-12-24

## 2019-12-24 NOTE — OUTREACH NOTE
Medical Week 1 Survey      Responses   Facility patient discharged from?  [facility]   Does the patient have one of the following disease processes/diagnoses(primary or secondary)?  Other   Is there a successful TCM telephone encounter documented?  No   Week 1 attempt successful?  No   Unsuccessful attempts  Attempt 1          Lazaro Davis RN

## 2019-12-27 ENCOUNTER — READMISSION MANAGEMENT (OUTPATIENT)
Dept: CALL CENTER | Facility: HOSPITAL | Age: 74
End: 2019-12-27

## 2019-12-27 NOTE — OUTREACH NOTE
"Medical Week 1 Survey      Responses   Facility patient discharged from?  [facility]   Does the patient have one of the following disease processes/diagnoses(primary or secondary)?  Other   Is there a successful TCM telephone encounter documented?  No   Week 1 attempt successful?  Yes   Call start time  1617   Call end time  1621   Discharge diagnosis  Nausea & vomiting    Person spoke with today (if not patient) and relationship  Hailey, crispin   Meds reviewed with patient/caregiver?  Yes   Is the patient having any side effects they believe may be caused by any medication additions or changes?  No   Does the patient have all medications ordered at discharge?  Yes   Is the patient taking all medications as directed (includes completed medication regime)?  Yes   Medication comments  at appointment today MD added new \"water pill\" for pt to take QOD   Does the patient have a primary care provider?   Yes   Does the patient have an appointment with their PCP within 7 days of discharge?  Yes   Has the patient kept scheduled appointments due by today?  Yes   Comments  Seen by PCP today.    Psychosocial issues?  No   Comments  Daughter sets up med box and transports to appointments   Did the patient receive a copy of their discharge instructions?  Yes   Nursing interventions  Reviewed instructions with patient   What is the patient's perception of their health status since discharge?  Improving   Is the patient/caregiver able to teach back signs and symptoms related to disease process for when to call PCP?  Yes   Is the patient/caregiver able to teach back signs and symptoms related to disease process for when to call 911?  Yes   Is the patient/caregiver able to teach back the hierarchy of who to call/visit for symptoms/problems? PCP, Specialist, Home health nurse, Urgent Care, ED, 911  Yes   Week 1 call completed?  Yes          Tami Contreras RN  "

## 2020-01-06 ENCOUNTER — READMISSION MANAGEMENT (OUTPATIENT)
Dept: CALL CENTER | Facility: HOSPITAL | Age: 75
End: 2020-01-06

## 2020-01-06 NOTE — OUTREACH NOTE
Medical Week 2 Survey      Responses   Facility patient discharged from?  [facility]   Does the patient have one of the following disease processes/diagnoses(primary or secondary)?  Other   Week 2 attempt successful?  Yes   Call start time  1012   Discharge diagnosis  Nausea & vomiting    Call end time  1016   Person spoke with today (if not patient) and relationship  Hailey, daughter   Meds reviewed with patient/caregiver?  Yes   Is the patient taking all medications as directed (includes completed medication regime)?  Yes   Does the patient have a primary care provider?   Yes   Comments regarding PCP  PCP appointment 1/3/20 and 2nd f/u with PCP is 1/17/20   Has the patient kept scheduled appointments due by today?  Yes   Comments  Went over all scheduled appointment with daughter    Psychosocial issues?  No   What is the patient's perception of their health status since discharge?  Improving   Week 2 Call Completed?  Yes          Larissa Collazo RN

## 2020-01-13 ENCOUNTER — READMISSION MANAGEMENT (OUTPATIENT)
Dept: CALL CENTER | Facility: HOSPITAL | Age: 75
End: 2020-01-13

## 2020-01-13 NOTE — OUTREACH NOTE
Medical Week 3 Survey      Responses   Facility patient discharged from?  [facility]   Does the patient have one of the following disease processes/diagnoses(primary or secondary)?  Other   Week 3 attempt successful?  No   Unsuccessful attempts  Attempt 1          Lucy Villareal RN

## 2020-01-15 ENCOUNTER — READMISSION MANAGEMENT (OUTPATIENT)
Dept: CALL CENTER | Facility: HOSPITAL | Age: 75
End: 2020-01-15

## 2020-01-15 NOTE — OUTREACH NOTE
Medical Week 3 Survey      Responses   Facility patient discharged from?  [facility]   Does the patient have one of the following disease processes/diagnoses(primary or secondary)?  Other   Week 3 attempt successful?  No   Unsuccessful attempts  Attempt 2          Michelle Estrella RN

## 2020-01-29 ENCOUNTER — OFFICE VISIT (OUTPATIENT)
Dept: SURGERY | Facility: CLINIC | Age: 75
End: 2020-01-29

## 2020-01-29 VITALS
SYSTOLIC BLOOD PRESSURE: 171 MMHG | WEIGHT: 222 LBS | DIASTOLIC BLOOD PRESSURE: 100 MMHG | BODY MASS INDEX: 33.65 KG/M2 | HEIGHT: 68 IN | HEART RATE: 72 BPM

## 2020-01-29 DIAGNOSIS — K43.9 VENTRAL HERNIA WITHOUT OBSTRUCTION OR GANGRENE: Primary | ICD-10-CM

## 2020-01-29 PROCEDURE — 99213 OFFICE O/P EST LOW 20 MIN: CPT | Performed by: SURGERY

## 2020-01-30 PROBLEM — K43.9 VENTRAL HERNIA WITHOUT OBSTRUCTION OR GANGRENE: Status: ACTIVE | Noted: 2020-01-30

## 2020-01-30 NOTE — PROGRESS NOTES
Subjective   Mayra Hays is a 74 y.o. female is being seen for consultation today at the request of Niki Antony APRN    Mayra Hays is a 74 y.o. female With history of atrial fibrillation on chronic anticoagulation and body mass index of 33.8.  She presents after ER presentation for small obstruction secondary to incarcerated ventral hernia that spontaneously reduced.  The hernia remains reducible and cholecystectomy is her only intra-abdominal surgery.  The patient is doing well otherwise at this time with no pain or obstructive symptoms.      Past Medical History:   Diagnosis Date   • Collapsed lung    • COPD (chronic obstructive pulmonary disease) (CMS/HCC)    • Diabetes mellitus (CMS/HCC)    • GERD (gastroesophageal reflux disease)    • Hyperlipidemia    • Hypertension    • Myocardial infarction (CMS/HCC)    • Stroke (CMS/HCC)        Family History   Problem Relation Age of Onset   • Heart disease Mother    • Heart disease Father        Social History     Socioeconomic History   • Marital status: Single     Spouse name: Not on file   • Number of children: Not on file   • Years of education: Not on file   • Highest education level: Not on file   Tobacco Use   • Smoking status: Former Smoker     Packs/day: 3.00     Types: Cigarettes     Last attempt to quit: 2015     Years since quittin.0   • Smokeless tobacco: Never Used   Substance and Sexual Activity   • Alcohol use: No   • Drug use: No   • Sexual activity: Defer       Past Surgical History:   Procedure Laterality Date   • CARDIAC CATHETERIZATION N/A 2017    Procedure: Left Heart Cath;  Surgeon: Jatinder Matias MD;  Location: [facility] INVASIVE LOCATION;  Service:    • GALLBLADDER SURGERY         Review of Systems   Constitutional: Negative for activity change, appetite change, chills and fever.   HENT: Negative for sore throat and trouble swallowing.    Eyes: Negative for visual disturbance.   Respiratory: Negative for cough and shortness  "of breath.    Cardiovascular: Negative for chest pain and palpitations.   Gastrointestinal: Negative for abdominal distention, abdominal pain, blood in stool, constipation, diarrhea, nausea and vomiting.   Endocrine: Negative for cold intolerance and heat intolerance.   Genitourinary: Negative for dysuria.   Musculoskeletal: Negative for joint swelling.   Skin: Negative for color change, rash and wound.   Allergic/Immunologic: Negative for immunocompromised state.   Neurological: Negative for dizziness, seizures, weakness and headaches.   Hematological: Negative for adenopathy. Does not bruise/bleed easily.   Psychiatric/Behavioral: Negative for agitation and confusion.         /100   Pulse 72   Ht 172.7 cm (67.99\")   Wt 101 kg (222 lb)   BMI 33.76 kg/m²   Objective   Physical Exam   Constitutional: She is oriented to person, place, and time. She appears well-developed.   HENT:   Head: Normocephalic and atraumatic.   Mouth/Throat: Mucous membranes are normal.   Eyes: Pupils are equal, round, and reactive to light. Conjunctivae are normal.   Neck: Neck supple. No JVD present. No tracheal deviation present. No thyromegaly present.   Cardiovascular: Normal rate and regular rhythm. Exam reveals no gallop and no friction rub.   No murmur heard.  Pulmonary/Chest: Effort normal and breath sounds normal.   Abdominal: Soft. She exhibits no distension. There is no splenomegaly or hepatomegaly. There is no tenderness. A hernia is present.       Musculoskeletal: Normal range of motion. She exhibits no deformity.   Neurological: She is alert and oriented to person, place, and time.   Skin: Skin is warm and dry.   Psychiatric: She has a normal mood and affect.             Assessment   Mayra was seen today for hernia.    Diagnoses and all orders for this visit:    Ventral hernia without obstruction or gangrene      Mayra Hays is a 74 y.o. female with recent ER presentation for incarcerated ventral hernia with small " bowel obstruction.  The hernia spontaneously reduced and remains reducible.  She is on chronic anticoagulation and has underlying lung disease.  Due to her cardiac risk factors surgical repair due to the recent incarceration is recommended but she will obtain cardiac clearance and recommendations regarding her perioperative anticoagulation management.  Follow-up after cardiac clearance.  She is aware of symptoms upon return to care.    Patient's Body mass index is 33.76 kg/m². BMI is above normal parameters. Recommendations include: educational material.

## 2020-02-06 ENCOUNTER — OFFICE VISIT (OUTPATIENT)
Dept: PULMONOLOGY | Facility: CLINIC | Age: 75
End: 2020-02-06

## 2020-02-06 VITALS
SYSTOLIC BLOOD PRESSURE: 116 MMHG | OXYGEN SATURATION: 91 % | HEIGHT: 68 IN | BODY MASS INDEX: 33.49 KG/M2 | HEART RATE: 60 BPM | DIASTOLIC BLOOD PRESSURE: 76 MMHG | WEIGHT: 221 LBS | TEMPERATURE: 97.6 F

## 2020-02-06 DIAGNOSIS — Z01.811 PREOPERATIVE RESPIRATORY EXAMINATION: ICD-10-CM

## 2020-02-06 DIAGNOSIS — J41.0 SIMPLE CHRONIC BRONCHITIS (HCC): Primary | ICD-10-CM

## 2020-02-06 DIAGNOSIS — G47.33 OSA (OBSTRUCTIVE SLEEP APNEA): ICD-10-CM

## 2020-02-06 PROCEDURE — 99214 OFFICE O/P EST MOD 30 MIN: CPT | Performed by: NURSE PRACTITIONER

## 2020-02-06 NOTE — PROGRESS NOTES
Have you had the Influenza Vaccine? no   Would you like to receive this Vaccine today? no    Have you had the Pneumonia Vaccine?  no  Would you like to receive this Vaccine today? no    Are you a current smoker? no      Subjective    Mayra Hays presents for the following COPD      History of Present Illness     Ms. Hunter is a 4-year-old female with a medical history significant for COPD, diabetes, GERD, hyperlipidemia, hypertension and stroke.      She presents today for routine follow-up on her COPD.  She reports no major complaints today and states that her shortness of breath and cough are at baseline.  Currently uses oxygen at night and on an as-needed basis.  She does tell me that she has a large umbilical hernia which she needs surgery for.  She is unsure if her surgeon will require a respiratory preoperative assessment but would like to include that in today's visit if possible.  She currently uses Breo once daily, Spiriva once daily and DuoNeb treatments as needed.  She states she normally takes her DuoNeb treatments about 4 times a day.  Her daughter has accompanied her to today's visit.        Review of Systems   Constitutional: Negative for activity change, fatigue and unexpected weight change.   HENT: Negative for congestion, postnasal drip and rhinorrhea.    Respiratory: Negative for apnea, cough, chest tightness, shortness of breath and wheezing.    Cardiovascular: Negative for chest pain and palpitations.   Gastrointestinal: Negative for nausea.   Allergic/Immunologic: Negative for environmental allergies.   Psychiatric/Behavioral: Negative for agitation and confusion.       Active Problems:  Problem List Items Addressed This Visit        Respiratory    Simple chronic bronchitis (CMS/HCC) - Primary    OSWALDO (obstructive sleep apnea)      Other Visit Diagnoses     Preoperative respiratory examination              Past Medical History:  Past Medical History:   Diagnosis Date   • Collapsed lung    • COPD  "(chronic obstructive pulmonary disease) (CMS/HCC)    • Diabetes mellitus (CMS/HCC)    • GERD (gastroesophageal reflux disease)    • Hyperlipidemia    • Hypertension    • Myocardial infarction (CMS/HCC)    • Stroke (CMS/HCC)        Family History:  Family History   Problem Relation Age of Onset   • Heart disease Mother    • Heart disease Father        Social History:  Social History     Tobacco Use   • Smoking status: Former Smoker     Packs/day: 3.00     Types: Cigarettes     Last attempt to quit: 2015     Years since quittin.1   • Smokeless tobacco: Never Used   Substance Use Topics   • Alcohol use: No       Current Medications:  Current Outpatient Medications   Medication Sig Dispense Refill   • albuterol 108 (90 Base) MCG/ACT inhaler Inhale 2 puffs Every 4 (Four) Hours As Needed for Wheezing.     • apixaban (ELIQUIS) 5 MG tablet tablet Take 5 mg by mouth 2 (Two) Times a Day.     • atorvastatin (LIPITOR) 20 MG tablet Take 20 mg by mouth Daily.     • BREO ELLIPTA 100-25 MCG/INH inhaler Inhale 1 puff by mouth once daily. 60 each 0   • bumetanide (BUMEX) 1 MG tablet Daily as needed for increased swelling or shortness of breath until follow up with PCP.     • ferrous sulfate 325 (65 FE) MG tablet Take 325 mg by mouth 2 (Two) Times a Day.     • hydrOXYzine (ATARAX) 25 MG tablet Take 25 mg by mouth Every Night.     • pantoprazole (PROTONIX) 40 MG EC tablet Take 40 mg by mouth Daily.     • potassium chloride (K-DUR,KLOR-CON) 20 MEQ CR tablet Daily as needed with Bumex only.     • promethazine (PHENERGAN) 12.5 MG tablet Take 1 tablet by mouth Every 6 (Six) Hours As Needed for Nausea or Vomiting. 12 tablet 0   • tiotropium bromide monohydrate (SPIRIVA RESPIMAT) 2.5 MCG/ACT aerosol solution inhaler Inhale 1 puff Daily. 4 g 11     No current facility-administered medications for this visit.        Allergies:  No Known Allergies    Vitals:  /76   Pulse 60   Temp 97.6 °F (36.4 °C) (Oral)   Ht 172.7 cm (68\")   Wt " 100 kg (221 lb)   SpO2 91%   BMI 33.60 kg/m²     Imaging:    Imaging Results (Most Recent)     None          Pulmonary Functions Testing Results:    No results found for: FEV1, FVC, RLT9XAT, TLC, DLCO    Results for orders placed or performed during the hospital encounter of 12/18/19   Blood Culture - Blood, Arm, Left   Result Value Ref Range    Blood Culture No growth at 5 days    Blood Culture - Blood, Hand, Left   Result Value Ref Range    Blood Culture No growth at 5 days    Urine Culture - Urine, Urine, Clean Catch   Result Value Ref Range    Urine Culture 25,000 CFU/mL Escherichia coli (A)        Susceptibility    Escherichia coli - DAVID     Ampicillin >=32 Resistant ug/ml     Ampicillin + Sulbactam >=32 Resistant ug/ml     Cefazolin <=4 Susceptible ug/ml     Cefepime <=1 Susceptible ug/ml     Ceftazidime <=1 Susceptible ug/ml     Ceftriaxone <=1 Susceptible ug/ml     Gentamicin <=1 Susceptible ug/ml     Levofloxacin <=0.12 Susceptible ug/ml     Nitrofurantoin <=16 Susceptible ug/ml     Piperacillin + Tazobactam <=4 Susceptible ug/ml     Tetracycline <=1 Susceptible ug/ml     Trimethoprim + Sulfamethoxazole >=320 Resistant ug/ml   Blood Culture - Blood, Arm, Left   Result Value Ref Range    Blood Culture No growth at 5 days    Comprehensive Metabolic Panel   Result Value Ref Range    Glucose 243 (H) 65 - 99 mg/dL    BUN 22 8 - 23 mg/dL    Creatinine 1.52 (H) 0.57 - 1.00 mg/dL    Sodium 140 136 - 145 mmol/L    Potassium 4.1 3.5 - 5.2 mmol/L    Chloride 98 98 - 107 mmol/L    CO2 26.5 22.0 - 29.0 mmol/L    Calcium 9.8 8.6 - 10.5 mg/dL    Total Protein 7.6 6.0 - 8.5 g/dL    Albumin 4.16 3.50 - 5.20 g/dL    ALT (SGPT) 15 1 - 33 U/L    AST (SGOT) 15 1 - 32 U/L    Alkaline Phosphatase 140 (H) 39 - 117 U/L    Total Bilirubin 0.3 0.2 - 1.2 mg/dL    eGFR Non African Amer 34 (L) >60 mL/min/1.73    Globulin 3.4 gm/dL    A/G Ratio 1.2 g/dL    BUN/Creatinine Ratio 14.5 7.0 - 25.0    Anion Gap 15.5 (H) 5.0 - 15.0 mmol/L    Urinalysis With Culture If Indicated - Urine, Clean Catch   Result Value Ref Range    Color, UA Dark Yellow (A) Yellow, Straw    Appearance, UA Cloudy (A) Clear    pH, UA <=5.0 5.0 - 8.0    Specific Gravity, UA 1.021 1.005 - 1.030    Glucose, UA Negative Negative    Ketones, UA Trace (A) Negative    Bilirubin, UA Small (1+) (A) Negative    Blood, UA Negative Negative    Protein, UA Trace (A) Negative    Leuk Esterase, UA Small (1+) (A) Negative    Nitrite, UA Negative Negative    Urobilinogen, UA 0.2 E.U./dL 0.2 - 1.0 E.U./dL   Lipase   Result Value Ref Range    Lipase 13 13 - 60 U/L   Troponin   Result Value Ref Range    Troponin T <0.010 0.000 - 0.030 ng/mL   CBC Auto Differential   Result Value Ref Range    WBC 22.73 (H) 3.40 - 10.80 10*3/mm3    RBC 4.61 3.77 - 5.28 10*6/mm3    Hemoglobin 13.0 12.0 - 15.9 g/dL    Hematocrit 39.7 34.0 - 46.6 %    MCV 86.1 79.0 - 97.0 fL    MCH 28.2 26.6 - 33.0 pg    MCHC 32.7 31.5 - 35.7 g/dL    RDW 12.2 (L) 12.3 - 15.4 %    RDW-SD 38.4 37.0 - 54.0 fl    MPV 11.3 6.0 - 12.0 fL    Platelets 264 140 - 450 10*3/mm3    Neutrophil % 86.0 (H) 42.7 - 76.0 %    Lymphocyte % 6.0 (L) 19.6 - 45.3 %    Monocyte % 7.0 5.0 - 12.0 %    Eosinophil % 0.2 (L) 0.3 - 6.2 %    Basophil % 0.3 0.0 - 1.5 %    Immature Grans % 0.5 0.0 - 0.5 %    Neutrophils, Absolute 19.53 (H) 1.70 - 7.00 10*3/mm3    Lymphocytes, Absolute 1.37 0.70 - 3.10 10*3/mm3    Monocytes, Absolute 1.60 (H) 0.10 - 0.90 10*3/mm3    Eosinophils, Absolute 0.05 0.00 - 0.40 10*3/mm3    Basophils, Absolute 0.07 0.00 - 0.20 10*3/mm3    Immature Grans, Absolute 0.11 (H) 0.00 - 0.05 10*3/mm3    nRBC 0.0 0.0 - 0.2 /100 WBC   Urinalysis, Microscopic Only - Urine, Clean Catch   Result Value Ref Range    RBC, UA 3-5 (A) None Seen, 0-2 /HPF    WBC, UA 6-12 (A) None Seen, 0-2 /HPF    Bacteria, UA None Seen None Seen /HPF    Squamous Epithelial Cells, UA 13-20 (A) None Seen, 0-2 /HPF    Hyaline Casts, UA 13-20 None Seen /LPF    Methodology  Automated Microscopy    Lactic Acid, Plasma   Result Value Ref Range    Lactate 1.8 0.5 - 2.0 mmol/L   TSH   Result Value Ref Range    TSH 2.670 0.270 - 4.200 uIU/mL   T4, Free   Result Value Ref Range    Free T4 1.01 0.93 - 1.70 ng/dL   Basic Metabolic Panel   Result Value Ref Range    Glucose 172 (H) 65 - 99 mg/dL    BUN 23 8 - 23 mg/dL    Creatinine 1.30 (H) 0.57 - 1.00 mg/dL    Sodium 139 136 - 145 mmol/L    Potassium 3.9 3.5 - 5.2 mmol/L    Chloride 97 (L) 98 - 107 mmol/L    CO2 30.3 (H) 22.0 - 29.0 mmol/L    Calcium 9.2 8.6 - 10.5 mg/dL    eGFR Non African Amer 40 (L) >60 mL/min/1.73    BUN/Creatinine Ratio 17.7 7.0 - 25.0    Anion Gap 11.7 5.0 - 15.0 mmol/L   Magnesium   Result Value Ref Range    Magnesium 1.7 1.6 - 2.4 mg/dL   Phosphorus   Result Value Ref Range    Phosphorus 3.8 2.5 - 4.5 mg/dL   Lipase   Result Value Ref Range    Lipase 11 (L) 13 - 60 U/L   Amylase   Result Value Ref Range    Amylase 42 28 - 100 U/L   CBC Auto Differential   Result Value Ref Range    WBC 18.51 (H) 3.40 - 10.80 10*3/mm3    RBC 4.29 3.77 - 5.28 10*6/mm3    Hemoglobin 11.8 (L) 12.0 - 15.9 g/dL    Hematocrit 37.1 34.0 - 46.6 %    MCV 86.5 79.0 - 97.0 fL    MCH 27.5 26.6 - 33.0 pg    MCHC 31.8 31.5 - 35.7 g/dL    RDW 12.5 12.3 - 15.4 %    RDW-SD 39.4 37.0 - 54.0 fl    MPV 11.3 6.0 - 12.0 fL    Platelets 239 140 - 450 10*3/mm3    Neutrophil % 80.5 (H) 42.7 - 76.0 %    Lymphocyte % 10.8 (L) 19.6 - 45.3 %    Monocyte % 6.8 5.0 - 12.0 %    Eosinophil % 1.0 0.3 - 6.2 %    Basophil % 0.4 0.0 - 1.5 %    Immature Grans % 0.5 0.0 - 0.5 %    Neutrophils, Absolute 14.89 (H) 1.70 - 7.00 10*3/mm3    Lymphocytes, Absolute 2.00 0.70 - 3.10 10*3/mm3    Monocytes, Absolute 1.26 (H) 0.10 - 0.90 10*3/mm3    Eosinophils, Absolute 0.19 0.00 - 0.40 10*3/mm3    Basophils, Absolute 0.08 0.00 - 0.20 10*3/mm3    Immature Grans, Absolute 0.09 (H) 0.00 - 0.05 10*3/mm3    nRBC 0.0 0.0 - 0.2 /100 WBC   Basic Metabolic Panel   Result Value Ref Range     Glucose 135 (H) 65 - 99 mg/dL    BUN 21 8 - 23 mg/dL    Creatinine 1.22 (H) 0.57 - 1.00 mg/dL    Sodium 136 136 - 145 mmol/L    Potassium 4.1 3.5 - 5.2 mmol/L    Chloride 98 98 - 107 mmol/L    CO2 26.6 22.0 - 29.0 mmol/L    Calcium 8.6 8.6 - 10.5 mg/dL    eGFR Non African Amer 43 (L) >60 mL/min/1.73    BUN/Creatinine Ratio 17.2 7.0 - 25.0    Anion Gap 11.4 5.0 - 15.0 mmol/L   Magnesium   Result Value Ref Range    Magnesium 1.6 1.6 - 2.4 mg/dL   Phosphorus   Result Value Ref Range    Phosphorus 3.6 2.5 - 4.5 mg/dL   CBC Auto Differential   Result Value Ref Range    WBC 16.79 (H) 3.40 - 10.80 10*3/mm3    RBC 4.13 3.77 - 5.28 10*6/mm3    Hemoglobin 11.3 (L) 12.0 - 15.9 g/dL    Hematocrit 36.4 34.0 - 46.6 %    MCV 88.1 79.0 - 97.0 fL    MCH 27.4 26.6 - 33.0 pg    MCHC 31.0 (L) 31.5 - 35.7 g/dL    RDW 12.6 12.3 - 15.4 %    RDW-SD 40.0 37.0 - 54.0 fl    MPV 10.9 6.0 - 12.0 fL    Platelets 210 140 - 450 10*3/mm3    Neutrophil % 77.0 (H) 42.7 - 76.0 %    Lymphocyte % 13.4 (L) 19.6 - 45.3 %    Monocyte % 7.4 5.0 - 12.0 %    Eosinophil % 1.3 0.3 - 6.2 %    Basophil % 0.4 0.0 - 1.5 %    Immature Grans % 0.5 0.0 - 0.5 %    Neutrophils, Absolute 12.93 (H) 1.70 - 7.00 10*3/mm3    Lymphocytes, Absolute 2.25 0.70 - 3.10 10*3/mm3    Monocytes, Absolute 1.24 (H) 0.10 - 0.90 10*3/mm3    Eosinophils, Absolute 0.22 0.00 - 0.40 10*3/mm3    Basophils, Absolute 0.07 0.00 - 0.20 10*3/mm3    Immature Grans, Absolute 0.08 (H) 0.00 - 0.05 10*3/mm3    nRBC 0.0 0.0 - 0.2 /100 WBC   Hemoglobin A1c   Result Value Ref Range    Hemoglobin A1C 6.60 (H) 4.80 - 5.60 %   Basic Metabolic Panel   Result Value Ref Range    Glucose 129 (H) 65 - 99 mg/dL    BUN 13 8 - 23 mg/dL    Creatinine 0.95 0.57 - 1.00 mg/dL    Sodium 139 136 - 145 mmol/L    Potassium 4.2 3.5 - 5.2 mmol/L    Chloride 102 98 - 107 mmol/L    CO2 28.2 22.0 - 29.0 mmol/L    Calcium 8.6 8.6 - 10.5 mg/dL    eGFR Non African Amer 58 (L) >60 mL/min/1.73    BUN/Creatinine Ratio 13.7 7.0 - 25.0     Anion Gap 8.8 5.0 - 15.0 mmol/L   Magnesium   Result Value Ref Range    Magnesium 1.9 1.6 - 2.4 mg/dL   Phosphorus   Result Value Ref Range    Phosphorus 3.2 2.5 - 4.5 mg/dL   CBC Auto Differential   Result Value Ref Range    WBC 10.14 3.40 - 10.80 10*3/mm3    RBC 3.86 3.77 - 5.28 10*6/mm3    Hemoglobin 10.7 (L) 12.0 - 15.9 g/dL    Hematocrit 34.5 34.0 - 46.6 %    MCV 89.4 79.0 - 97.0 fL    MCH 27.7 26.6 - 33.0 pg    MCHC 31.0 (L) 31.5 - 35.7 g/dL    RDW 12.8 12.3 - 15.4 %    RDW-SD 42.3 37.0 - 54.0 fl    MPV 11.2 6.0 - 12.0 fL    Platelets 190 140 - 450 10*3/mm3    Neutrophil % 69.4 42.7 - 76.0 %    Lymphocyte % 18.0 (L) 19.6 - 45.3 %    Monocyte % 9.7 5.0 - 12.0 %    Eosinophil % 2.0 0.3 - 6.2 %    Basophil % 0.5 0.0 - 1.5 %    Immature Grans % 0.4 0.0 - 0.5 %    Neutrophils, Absolute 7.04 (H) 1.70 - 7.00 10*3/mm3    Lymphocytes, Absolute 1.83 0.70 - 3.10 10*3/mm3    Monocytes, Absolute 0.98 (H) 0.10 - 0.90 10*3/mm3    Eosinophils, Absolute 0.20 0.00 - 0.40 10*3/mm3    Basophils, Absolute 0.05 0.00 - 0.20 10*3/mm3    Immature Grans, Absolute 0.04 0.00 - 0.05 10*3/mm3    nRBC 0.0 0.0 - 0.2 /100 WBC   POC Glucose Once   Result Value Ref Range    Glucose 157 (H) 70 - 130 mg/dL   POC Glucose Once   Result Value Ref Range    Glucose 149 (H) 70 - 130 mg/dL   POC Glucose Once   Result Value Ref Range    Glucose 169 (H) 70 - 130 mg/dL   POC Glucose Once   Result Value Ref Range    Glucose 199 (H) 70 - 130 mg/dL   POC Glucose Once   Result Value Ref Range    Glucose 146 (H) 70 - 130 mg/dL   POC Glucose Once   Result Value Ref Range    Glucose 171 (H) 70 - 130 mg/dL   POC Glucose Once   Result Value Ref Range    Glucose 259 (H) 70 - 130 mg/dL       Objective   Physical Exam     GENERAL APPEARANCE: Well developed, well nourished, alert and cooperative, and appears to be in no acute distress.    HEAD: normocephalic. Atraumatic.    EYES: PERRL, EOMI. Vision is grossly intact.    THROAT: Oral cavity and pharynx normal. No  inflammation, swelling, exudate, or lesions.     NECK: Neck supple.  No thyromegaly.    CARDIAC: Normal S1 and S2. No S3, S4 or murmurs. Rhythm is regular. There is no peripheral edema, cyanosis or pallor. Extremities are warm and well perfused. Capillary refill is less than 2 seconds. No carotid bruits.    RESPIRATORY:Bilateral air entry positive. Bilateral diminished breath sounds. No wheezing, crackles or rhonchi noted.    GI: Positive bowel sounds. Soft, nondistended, nontender.     MUSCULOSKELETAL: No significant deformity or joint abnormality. No edema. Peripheral pulses intact. No varicosities.    NEUROLOGICAL: Strength and sensation symmetric and intact throughout.     PSYCHIATRIC: The mental examination revealed the patient was oriented to person, place, and time.       Assessment/Plan      Centrilobular Emphysema:  -Continue duonebs as needed.  -Continue Breo once daily.  -Continue Spiriva once daily.  -Continue supplemental oxygen at 2 L/min as needed.  At last visit we ordered patient some portable oxygen tanks to carry with her should she need them when she is out at her doctor's appointments and in town.  She states that she never received these tanks.  We will call her [facility] to check on this.    -PFT in 2018 shows a severe obstruction.  As part of her preoperative assessment we will complete a spirometry in office today.  -Spirometry in office showed an FEV1/FVC of 81% and FEV1 of 49%.  A severe restriction was noted.  Her severe restriction is likely related to her umbilical hernia.  We will repeat her PFT after her surgery to assess restriction at this time.      Postoperative Respiratory Failure risk probability: high  ARISCAT score: high  Malampati score: 2    Complications include post-operative pneumonia, atelectasis, COPD exacerbation, respiratory failure with ventilatory support, acute lung injury (aspiration pneumonitis), acute respiratory distress syndrome, and pulmonary embolism.      They conveyed understanding of the risks involved.    If intubation is required, pulmonology recommends using sedatives that do not result in histamine release.  For ventilator settings if needed, recommend lung protective strategy with low lung volume   - tidal volume of 6-8 mL/kg of predicted body weight  - PEEP at 6-8 cm of water, plateau pressure < 30  - frequent checking for Autopeep and leak of ventilator settings  Also recommend   - early mobilization while in hospital  - DVT prophylaxis  - incentive spirometer use post-operatively to increase lung volumes and prevent basal atelectasis.      OSWALDO:  -Noncompliant with CPAP at this time.  -Encouraged her to wear her supplemental oxygen at night.  -Patient's Body mass index is 33.6 kg/m². BMI is above normal parameters. Recommendations include: exercise counseling and nutrition counseling.  - Patient was educated on positive airway pressure treatment.  As per CMS guidelines, more than 4 hours on 70% of observed nights is considered adherence. Patient was strongly encouraged to use CPAP as much as possible during sleep as more CPAP use is equal to more benefit. Use of heated humidification in positive airway pressure treatment to improve the adherence to the device.  In case of claustrophobia, we will provide the patient cognitive behavioral therapy and desensitization. Oral appliances use will be discussed with the patient in case of mild to moderate sleep apnea or if the patient with severe disease fail positive airway pressure treatment.       The patient was extensively educated on the consequences of untreated obstructive sleep apnea namely cardiovascular/metabolic disorder, neurocognitive deficit, daytime sleepiness, motor vehicle accidents, depression, mood disorders and reduced quality of life.  At the end of conversation, the patient voices understanding of the disease process and treatment modality.  Patient also understands the risk of untreated  obstructive sleep apnea and benefit benefits of the treatment.    Counseling time was greater than 10 minutes.            ICD-10-CM ICD-9-CM   1. Simple chronic bronchitis (CMS/HCC) J41.0 491.0   2. Preoperative respiratory examination Z01.811 V72.82   3. OSWALDO (obstructive sleep apnea) G47.33 327.23       Return in about 6 months (around 8/6/2020).

## 2020-02-12 DIAGNOSIS — Z01.811 PREOPERATIVE RESPIRATORY EXAMINATION: Primary | ICD-10-CM

## 2020-02-25 ENCOUNTER — OFFICE VISIT (OUTPATIENT)
Dept: CARDIOLOGY | Facility: CLINIC | Age: 75
End: 2020-02-25

## 2020-02-25 VITALS
HEART RATE: 69 BPM | DIASTOLIC BLOOD PRESSURE: 68 MMHG | BODY MASS INDEX: 34.1 KG/M2 | SYSTOLIC BLOOD PRESSURE: 139 MMHG | WEIGHT: 225 LBS | HEIGHT: 68 IN | OXYGEN SATURATION: 95 %

## 2020-02-25 DIAGNOSIS — I48.0 PAROXYSMAL ATRIAL FIBRILLATION (HCC): Primary | ICD-10-CM

## 2020-02-25 DIAGNOSIS — I50.32 CHRONIC DIASTOLIC HEART FAILURE (HCC): ICD-10-CM

## 2020-02-25 DIAGNOSIS — I10 ESSENTIAL HYPERTENSION: ICD-10-CM

## 2020-02-25 PROCEDURE — 99213 OFFICE O/P EST LOW 20 MIN: CPT | Performed by: PHYSICIAN ASSISTANT

## 2020-02-25 RX ORDER — ESOMEPRAZOLE MAGNESIUM 40 MG/1
40 CAPSULE, DELAYED RELEASE ORAL
Status: ON HOLD | COMMUNITY
End: 2020-05-13

## 2020-02-25 NOTE — PROGRESS NOTES
Niki Antony APRN  Mayra Hays  1945 02/25/2020    Patient Active Problem List   Diagnosis   • Pneumonia   • Sepsis due to pneumonia (CMS/HCC)   • Chronic diastolic heart failure (CMS/HCC)   • Elevated troponin   • Chest pain in adult   • Simple chronic bronchitis (CMS/HCC)   • Essential hypertension   • Type 2 diabetes mellitus (CMS/HCC)   • Abnormal nuclear stress test with moderate to large size anteroapical and lateral wall myocardial ischemia.   • Pneumonia of left lower lobe due to infectious organism (CMS/HCC)   • Acute renal insufficiency   • Paroxysmal atrial fibrillation (CMS/HCC)   • Chest pain   • OSWALDO (obstructive sleep apnea)   • Hypoxia   • Nausea & vomiting   • Ventral hernia without obstruction or gangrene       Dear Niki Antony APRN:    Subjective     History of Present Illness:    Chief Complaint   Patient presents with   • Med Refill       Mayra Hays is a pleasant 74 y.o. female with a past medical history significant for paroxysmal atrial fibrillation on flecainide and Eliquis.  She is here for a regular cardiology follow-up.     Patient reports she has been doing well from cardiac standpoint and has no symptoms to bring forth today.  She has been taking her Eliquis regularly and denies missing any dosages and denies any bleeding issues with this.  She also denies any chest pains, shortness of breath, palpitations, dizziness, or syncope she does have some abdominal pain but she is here for cardiac risk assessment regarding ventral hernia.        [facility] on 8/22/2017  Coronary angiograms:  Left main coronary artery is normal and bifurcates into a medium-sized left and descending and left circumflex systems.     Left anterior descending coronary artery is angiographically normal including the diagonal branches.     Left circumflex coronary artery is angiographically normal including the obtuse marginal branches     Right coronary artery is dominant for posterior circulation and is  normal as well.     Recommendations: In view of absence of any coronary artery disease, we'll continue to emphasize on risk factor modification as needed for now and discontinue the aspirin and Plavix.        Jatinder Matias M.D. [facility]        No Known Allergies:      Current Outpatient Medications:   •  BREO ELLIPTA 100-25 MCG/INH inhaler, Inhale 1 puff by mouth once daily., Disp: 60 each, Rfl: 0  •  esomeprazole (nexIUM) 40 MG capsule, Take 40 mg by mouth Every Morning Before Breakfast., Disp: , Rfl:   •  ferrous sulfate 325 (65 FE) MG tablet, Take 325 mg by mouth 2 (Two) Times a Day., Disp: , Rfl:   •  hydrOXYzine (ATARAX) 25 MG tablet, Take 25 mg by mouth Every Night., Disp: , Rfl:   •  pantoprazole (PROTONIX) 40 MG EC tablet, Take 40 mg by mouth Daily., Disp: , Rfl:   •  albuterol 108 (90 Base) MCG/ACT inhaler, Inhale 2 puffs Every 4 (Four) Hours As Needed for Wheezing., Disp: , Rfl:   •  apixaban (ELIQUIS) 5 MG tablet tablet, Take 5 mg by mouth 2 (Two) Times a Day., Disp: , Rfl:   •  atorvastatin (LIPITOR) 20 MG tablet, Take 20 mg by mouth Daily., Disp: , Rfl:   •  bumetanide (BUMEX) 1 MG tablet, Daily as needed for increased swelling or shortness of breath until follow up with PCP., Disp: , Rfl:   •  potassium chloride (K-DUR,KLOR-CON) 20 MEQ CR tablet, Daily as needed with Bumex only., Disp: , Rfl:   •  promethazine (PHENERGAN) 12.5 MG tablet, Take 1 tablet by mouth Every 6 (Six) Hours As Needed for Nausea or Vomiting., Disp: 12 tablet, Rfl: 0  •  tiotropium bromide monohydrate (SPIRIVA RESPIMAT) 2.5 MCG/ACT aerosol solution inhaler, Inhale 1 puff Daily., Disp: 4 g, Rfl: 11    The following portions of the patient's history were reviewed and updated as appropriate: allergies, current medications, past family history, past medical history, past social history, past surgical history and problem list.    Social History     Tobacco Use   • Smoking status: Former Smoker     Packs/day: 3.00     Types: Cigarettes     " Last attempt to quit: 2015     Years since quittin.1   • Smokeless tobacco: Never Used   Substance Use Topics   • Alcohol use: No   • Drug use: No       Review of Systems   Constitution: Negative for malaise/fatigue.   Cardiovascular: Negative for chest pain, dyspnea on exertion, irregular heartbeat, leg swelling, near-syncope and palpitations.   Respiratory: Negative for cough and shortness of breath.    Hematologic/Lymphatic: Negative for bleeding problem. Does not bruise/bleed easily.   Gastrointestinal: Negative for nausea and vomiting.   Neurological: Negative for dizziness and weakness.       Objective   Vitals:    20 1107   BP: 139/68   BP Location: Left arm   Patient Position: Sitting   Cuff Size: Adult   Pulse: 69   SpO2: 95%   Weight: 102 kg (225 lb)   Height: 172.7 cm (67.99\")     Body mass index is 34.22 kg/m².    Physical Exam   Constitutional: She is oriented to person, place, and time. She appears well-developed and well-nourished. No distress.   HENT:   Head: Normocephalic and atraumatic.   Cardiovascular: Normal rate, regular rhythm and normal heart sounds.   Pulmonary/Chest: Effort normal and breath sounds normal. No respiratory distress.   Musculoskeletal: She exhibits no edema.   Neurological: She is alert and oriented to person, place, and time.   Skin: She is not diaphoretic.       Lab Results   Component Value Date     2019    K 4.2 2019     2019    CO2 28.2 2019    BUN 13 2019    CREATININE 0.95 2019    GLUCOSE 129 (H) 2019    CALCIUM 8.6 2019    AST 15 2019    ALT 15 2019    ALKPHOS 140 (H) 2019     Lab Results   Component Value Date    CKTOTAL 14 (L) 2018     Lab Results   Component Value Date    WBC 10.14 2019    HGB 10.7 (L) 2019    HCT 34.5 2019     2019     Lab Results   Component Value Date    INR 1.02 2017    INR 1.03 2017    INR 0.98 2017 "     Lab Results   Component Value Date    MG 1.9 12/20/2019     Lab Results   Component Value Date    TSH 2.670 12/18/2019    TRIG 119 12/13/2018    HDL 33 (L) 12/13/2018    LDL 72 12/13/2018      Lab Results   Component Value Date    BNP 29.0 02/01/2019       During this visit the following were done:  Labs Reviewed [x]    Labs Ordered []    Radiology Reports Reviewed [x]    Radiology Ordered []    PCP Records Reviewed []    Referring Provider Records Reviewed []    ER Records Reviewed []    Hospital Records Reviewed []    History Obtained From Family []    Radiology Images Reviewed []    Other Reviewed []    Records Requested []       Procedures    Assessment/Plan    Diagnosis Plan   1. Paroxysmal atrial fibrillation (CMS/HCC)     2. Essential hypertension     3. Chronic diastolic heart failure (CMS/HCC)              Recommendations:  1. I did discuss with patient that she will have to hold her Eliquis 48 hours prior to the procedure which will increase her risk for stroke she expressed understanding both with her risk and holding Eliquis for 48 hours.  I will discussed with Florencio about clearing patient with low to moderate risk given history of atrial fibrillation.  She will need to restart Eliquis as soon as it is safe per surgeon standpoint.    Return in about 3 months (around 5/25/2020).    As always, I appreciate very much the opportunity to participate in the cardiovascular care of your patients.      With Best Regards,    Darek Nelson PA-C

## 2020-03-11 ENCOUNTER — OFFICE VISIT (OUTPATIENT)
Dept: SURGERY | Facility: CLINIC | Age: 75
End: 2020-03-11

## 2020-03-11 VITALS — BODY MASS INDEX: 34.1 KG/M2 | HEIGHT: 68 IN | WEIGHT: 225 LBS

## 2020-03-11 DIAGNOSIS — K43.9 VENTRAL HERNIA WITHOUT OBSTRUCTION OR GANGRENE: Primary | ICD-10-CM

## 2020-03-11 DIAGNOSIS — J44.9 CHRONIC OBSTRUCTIVE PULMONARY DISEASE, UNSPECIFIED COPD TYPE (HCC): ICD-10-CM

## 2020-03-11 DIAGNOSIS — I48.0 PAROXYSMAL ATRIAL FIBRILLATION (HCC): ICD-10-CM

## 2020-03-11 PROCEDURE — 99214 OFFICE O/P EST MOD 30 MIN: CPT | Performed by: SURGERY

## 2020-03-11 RX ORDER — SODIUM CHLORIDE 0.9 % (FLUSH) 0.9 %
10 SYRINGE (ML) INJECTION AS NEEDED
Status: CANCELLED | OUTPATIENT
Start: 2020-03-11

## 2020-03-11 RX ORDER — CELECOXIB 200 MG/1
200 CAPSULE ORAL ONCE
Status: CANCELLED | OUTPATIENT
Start: 2020-03-11 | End: 2020-03-11

## 2020-03-11 RX ORDER — CEFAZOLIN SODIUM 2 G/50ML
2 SOLUTION INTRAVENOUS ONCE
Status: CANCELLED | OUTPATIENT
Start: 2020-03-11 | End: 2020-03-11

## 2020-03-11 RX ORDER — ONDANSETRON 2 MG/ML
4 INJECTION INTRAMUSCULAR; INTRAVENOUS EVERY 6 HOURS PRN
Status: CANCELLED | OUTPATIENT
Start: 2020-03-11

## 2020-03-11 RX ORDER — ACETAMINOPHEN 325 MG/1
650 TABLET ORAL ONCE
Status: CANCELLED | OUTPATIENT
Start: 2020-03-11 | End: 2020-03-11

## 2020-03-11 RX ORDER — SODIUM CHLORIDE 0.9 % (FLUSH) 0.9 %
3 SYRINGE (ML) INJECTION EVERY 12 HOURS SCHEDULED
Status: CANCELLED | OUTPATIENT
Start: 2020-03-11

## 2020-03-11 NOTE — PROGRESS NOTES
Subjective   Mayra Hays is a 74 y.o. female is being seen for consultation today at the request of Niki Antony APRN    Mayra Hays is a 74 y.o. female With recent history of incarcerated umbilical hernia.  The patient had an umbilical hernia containing small bowel that reduced spontaneously.  The patient continued has pain at this location but no evidence of incarceration.  She has obtained cardiac clearance.  She is on anticoagulation for atrial fibrillation.  No obstructive symptoms at this time.  No previous hernia repairs noted.      Past Medical History:   Diagnosis Date   • Collapsed lung    • COPD (chronic obstructive pulmonary disease) (CMS/HCC)    • Diabetes mellitus (CMS/HCC)    • GERD (gastroesophageal reflux disease)    • Hyperlipidemia    • Hypertension    • Myocardial infarction (CMS/HCC)    • Stroke (CMS/HCC)        Family History   Problem Relation Age of Onset   • Heart disease Mother    • Heart disease Father        Social History     Socioeconomic History   • Marital status: Single     Spouse name: Not on file   • Number of children: Not on file   • Years of education: Not on file   • Highest education level: Not on file   Tobacco Use   • Smoking status: Former Smoker     Packs/day: 3.00     Types: Cigarettes     Last attempt to quit: 2015     Years since quittin.1   • Smokeless tobacco: Never Used   Substance and Sexual Activity   • Alcohol use: No   • Drug use: No   • Sexual activity: Defer       Past Surgical History:   Procedure Laterality Date   • CARDIAC CATHETERIZATION N/A 2017    Procedure: Left Heart Cath;  Surgeon: Jatinder Matias MD;  Location: [facility] INVASIVE LOC[facility];  Service:    • GALLBLADDER SURGERY         Review of Systems   Constitutional: Negative for activity change, appetite change, chills and fever.   HENT: Negative for sore throat and trouble swallowing.    Eyes: Negative for visual disturbance.   Respiratory: Negative for cough and shortness of  "breath.    Cardiovascular: Negative for chest pain and palpitations.   Gastrointestinal: Positive for abdominal pain. Negative for abdominal distention, blood in stool, constipation, diarrhea, nausea and vomiting.   Endocrine: Negative for cold intolerance and heat intolerance.   Genitourinary: Negative for dysuria.   Musculoskeletal: Negative for joint swelling.   Skin: Negative for color change, rash and wound.   Allergic/Immunologic: Negative for immunocompromised state.   Neurological: Negative for dizziness, seizures, weakness and headaches.   Hematological: Negative for adenopathy. Does not bruise/bleed easily.   Psychiatric/Behavioral: Negative for agitation and confusion.         Ht 172.7 cm (67.99\")   Wt 102 kg (225 lb)   BMI 34.22 kg/m²   Objective   Physical Exam   Constitutional: She is oriented to person, place, and time. She appears well-developed.   HENT:   Head: Normocephalic and atraumatic.   Mouth/Throat: Mucous membranes are normal.   Eyes: Pupils are equal, round, and reactive to light. Conjunctivae are normal.   Neck: Neck supple. No JVD present. No tracheal deviation present. No thyromegaly present.   Cardiovascular: Normal rate and regular rhythm. Exam reveals no gallop and no friction rub.   No murmur heard.  Pulmonary/Chest: Effort normal and breath sounds normal.   Abdominal: Soft. She exhibits no distension. There is no splenomegaly or hepatomegaly. There is no tenderness. A hernia is present. Hernia confirmed positive in the ventral area.   Musculoskeletal: Normal range of motion. She exhibits no deformity.   Neurological: She is alert and oriented to person, place, and time.   Skin: Skin is warm and dry.   Psychiatric: She has a normal mood and affect.             Assessment   Mayra was seen today for ventral hernia without obstructionor gangrene.    Diagnoses and all orders for this visit:    Ventral hernia without obstruction or gangrene  -     Case Request; Standing  -     CBC & " Differential; Future  -     Comprehensive Metabolic Panel; Future  -     Pregnancy, Urine - Urine, Clean Catch; Future  -     Case Request    Paroxysmal atrial fibrillation (CMS/HCC)  -     Case Request; Standing  -     CBC & Differential; Future  -     Comprehensive Metabolic Panel; Future  -     Pregnancy, Urine - Urine, Clean Catch; Future  -     Case Request    Chronic obstructive pulmonary disease, unspecified COPD type (CMS/HCC)  -     Case Request; Standing  -     CBC & Differential; Future  -     Comprehensive Metabolic Panel; Future  -     Pregnancy, Urine - Urine, Clean Catch; Future  -     Case Request    Other orders  -     Follow Anesthesia Guidelines / Standing Orders; Future  -     Provide NPO Instructions to Patient; Future  -     Chlorhexidine Skin Prep; Future      Mayra Hays is a 74 y.o. female with chronic anticoagulation secondary to atrial fibrillation and underlying COPD as well presenting with ventral hernia that has become incarcerated  Requiring manual reduction.  Risk and benefits of surgery have been discussed with the patient and she is obtained cardiac clearance.  She will hold blood thinners 48 hours prior to surgery and proceed with robotic ventral hernia repair with mesh.  We will plan to resume anticoagulation 24 hours after surgery.  Patient's Body mass index is 34.22 kg/m². BMI is above normal parameters. Recommendations include: educational material.              15:30

## 2020-03-18 ENCOUNTER — APPOINTMENT (OUTPATIENT)
Dept: PREADMISSION TESTING | Facility: HOSPITAL | Age: 75
End: 2020-03-18

## 2020-05-12 ENCOUNTER — APPOINTMENT (OUTPATIENT)
Dept: CT IMAGING | Facility: HOSPITAL | Age: 75
End: 2020-05-12

## 2020-05-12 ENCOUNTER — HOSPITAL ENCOUNTER (INPATIENT)
Facility: HOSPITAL | Age: 75
LOS: 7 days | Discharge: HOME | End: 2020-05-19
Attending: EMERGENCY MEDICINE | Admitting: HOSPITALIST

## 2020-05-12 DIAGNOSIS — K56.609 SBO (SMALL BOWEL OBSTRUCTION) (HCC): Primary | ICD-10-CM

## 2020-05-12 LAB
ALBUMIN SERPL-MCNC: 4.08 G/DL (ref 3.5–5.2)
ALBUMIN/GLOB SERPL: 1.3 G/DL
ALP SERPL-CCNC: 111 U/L (ref 39–117)
ALT SERPL W P-5'-P-CCNC: 15 U/L (ref 1–33)
ANION GAP SERPL CALCULATED.3IONS-SCNC: 12.8 MMOL/L (ref 5–15)
AST SERPL-CCNC: 15 U/L (ref 1–32)
BACTERIA UR QL AUTO: ABNORMAL /HPF
BASOPHILS # BLD AUTO: 0.04 10*3/MM3 (ref 0–0.2)
BASOPHILS NFR BLD AUTO: 0.6 % (ref 0–1.5)
BILIRUB SERPL-MCNC: 0.3 MG/DL (ref 0.2–1.2)
BILIRUB UR QL STRIP: NEGATIVE
BUN BLD-MCNC: 17 MG/DL (ref 8–23)
BUN/CREAT SERPL: 17.3 (ref 7–25)
CALCIUM SPEC-SCNC: 9.1 MG/DL (ref 8.6–10.5)
CHLORIDE SERPL-SCNC: 101 MMOL/L (ref 98–107)
CLARITY UR: ABNORMAL
CO2 SERPL-SCNC: 23.2 MMOL/L (ref 22–29)
COLOR UR: YELLOW
CREAT BLD-MCNC: 0.98 MG/DL (ref 0.57–1)
CRP SERPL-MCNC: 0.27 MG/DL (ref 0–0.5)
D-LACTATE SERPL-SCNC: 0.7 MMOL/L (ref 0.5–2)
DEPRECATED RDW RBC AUTO: 40.7 FL (ref 37–54)
EOSINOPHIL # BLD AUTO: 0.33 10*3/MM3 (ref 0–0.4)
EOSINOPHIL NFR BLD AUTO: 4.9 % (ref 0.3–6.2)
ERYTHROCYTE [DISTWIDTH] IN BLOOD BY AUTOMATED COUNT: 13.1 % (ref 12.3–15.4)
GFR SERPL CREATININE-BSD FRML MDRD: 56 ML/MIN/1.73
GLOBULIN UR ELPH-MCNC: 3.1 GM/DL
GLUCOSE BLD-MCNC: 111 MG/DL (ref 65–99)
GLUCOSE UR STRIP-MCNC: NEGATIVE MG/DL
HCT VFR BLD AUTO: 41.6 % (ref 34–46.6)
HGB BLD-MCNC: 13.2 G/DL (ref 12–15.9)
HGB UR QL STRIP.AUTO: NEGATIVE
HOLD SPECIMEN: NORMAL
HOLD SPECIMEN: NORMAL
HYALINE CASTS UR QL AUTO: ABNORMAL /LPF
IMM GRANULOCYTES # BLD AUTO: 0.03 10*3/MM3 (ref 0–0.05)
IMM GRANULOCYTES NFR BLD AUTO: 0.4 % (ref 0–0.5)
KETONES UR QL STRIP: ABNORMAL
LEUKOCYTE ESTERASE UR QL STRIP.AUTO: ABNORMAL
LIPASE SERPL-CCNC: 15 U/L (ref 13–60)
LYMPHOCYTES # BLD AUTO: 2.08 10*3/MM3 (ref 0.7–3.1)
LYMPHOCYTES NFR BLD AUTO: 30.7 % (ref 19.6–45.3)
MAGNESIUM SERPL-MCNC: 1.8 MG/DL (ref 1.6–2.4)
MCH RBC QN AUTO: 27.3 PG (ref 26.6–33)
MCHC RBC AUTO-ENTMCNC: 31.7 G/DL (ref 31.5–35.7)
MCV RBC AUTO: 86 FL (ref 79–97)
MONOCYTES # BLD AUTO: 0.82 10*3/MM3 (ref 0.1–0.9)
MONOCYTES NFR BLD AUTO: 12.1 % (ref 5–12)
NEUTROPHILS # BLD AUTO: 3.47 10*3/MM3 (ref 1.7–7)
NEUTROPHILS NFR BLD AUTO: 51.3 % (ref 42.7–76)
NITRITE UR QL STRIP: NEGATIVE
NRBC BLD AUTO-RTO: 0 /100 WBC (ref 0–0.2)
PH UR STRIP.AUTO: <=5 [PH] (ref 5–8)
PLATELET # BLD AUTO: 217 10*3/MM3 (ref 140–450)
PMV BLD AUTO: 11.5 FL (ref 6–12)
POTASSIUM BLD-SCNC: 4.2 MMOL/L (ref 3.5–5.2)
PROT SERPL-MCNC: 7.2 G/DL (ref 6–8.5)
PROT UR QL STRIP: NEGATIVE
RBC # BLD AUTO: 4.84 10*6/MM3 (ref 3.77–5.28)
RBC # UR: ABNORMAL /HPF
REF LAB TEST METHOD: ABNORMAL
SODIUM BLD-SCNC: 137 MMOL/L (ref 136–145)
SP GR UR STRIP: 1.03 (ref 1–1.03)
SQUAMOUS #/AREA URNS HPF: ABNORMAL /HPF
TROPONIN T SERPL-MCNC: <0.01 NG/ML (ref 0–0.03)
UROBILINOGEN UR QL STRIP: ABNORMAL
WBC NRBC COR # BLD: 6.77 10*3/MM3 (ref 3.4–10.8)
WBC UR QL AUTO: ABNORMAL /HPF
WHOLE BLOOD HOLD SPECIMEN: NORMAL
WHOLE BLOOD HOLD SPECIMEN: NORMAL

## 2020-05-12 PROCEDURE — 85025 COMPLETE CBC W/AUTO DIFF WBC: CPT | Performed by: PHYSICIAN ASSISTANT

## 2020-05-12 PROCEDURE — 25010000002 PIPERACILLIN SOD-TAZOBACTAM PER 1 G: Performed by: FAMILY MEDICINE

## 2020-05-12 PROCEDURE — 87635 SARS-COV-2 COVID-19 AMP PRB: CPT | Performed by: PHYSICIAN ASSISTANT

## 2020-05-12 PROCEDURE — 83036 HEMOGLOBIN GLYCOSYLATED A1C: CPT | Performed by: HOSPITALIST

## 2020-05-12 PROCEDURE — 81001 URINALYSIS AUTO W/SCOPE: CPT | Performed by: PHYSICIAN ASSISTANT

## 2020-05-12 PROCEDURE — 86140 C-REACTIVE PROTEIN: CPT | Performed by: PHYSICIAN ASSISTANT

## 2020-05-12 PROCEDURE — 80053 COMPREHEN METABOLIC PANEL: CPT | Performed by: PHYSICIAN ASSISTANT

## 2020-05-12 PROCEDURE — 87040 BLOOD CULTURE FOR BACTERIA: CPT | Performed by: FAMILY MEDICINE

## 2020-05-12 PROCEDURE — 83690 ASSAY OF LIPASE: CPT | Performed by: PHYSICIAN ASSISTANT

## 2020-05-12 PROCEDURE — 25010000002 MORPHINE PER 10 MG: Performed by: EMERGENCY MEDICINE

## 2020-05-12 PROCEDURE — 82962 GLUCOSE BLOOD TEST: CPT

## 2020-05-12 PROCEDURE — 93005 ELECTROCARDIOGRAM TRACING: CPT | Performed by: FAMILY MEDICINE

## 2020-05-12 PROCEDURE — 25010000002 ONDANSETRON PER 1 MG: Performed by: EMERGENCY MEDICINE

## 2020-05-12 PROCEDURE — 74176 CT ABD & PELVIS W/O CONTRAST: CPT

## 2020-05-12 PROCEDURE — 0D9670Z DRAINAGE OF STOMACH WITH DRAINAGE DEVICE, VIA NATURAL OR ARTIFICIAL OPENING: ICD-10-PCS | Performed by: FAMILY MEDICINE

## 2020-05-12 PROCEDURE — 36415 COLL VENOUS BLD VENIPUNCTURE: CPT

## 2020-05-12 PROCEDURE — 84484 ASSAY OF TROPONIN QUANT: CPT | Performed by: FAMILY MEDICINE

## 2020-05-12 PROCEDURE — 87086 URINE CULTURE/COLONY COUNT: CPT | Performed by: PHYSICIAN ASSISTANT

## 2020-05-12 PROCEDURE — 83735 ASSAY OF MAGNESIUM: CPT | Performed by: PHYSICIAN ASSISTANT

## 2020-05-12 PROCEDURE — 99284 EMERGENCY DEPT VISIT MOD MDM: CPT

## 2020-05-12 PROCEDURE — 83605 ASSAY OF LACTIC ACID: CPT | Performed by: FAMILY MEDICINE

## 2020-05-12 RX ORDER — MORPHINE SULFATE 2 MG/ML
2 INJECTION, SOLUTION INTRAMUSCULAR; INTRAVENOUS ONCE
Status: COMPLETED | OUTPATIENT
Start: 2020-05-12 | End: 2020-05-13

## 2020-05-12 RX ORDER — MORPHINE SULFATE 2 MG/ML
2 INJECTION, SOLUTION INTRAMUSCULAR; INTRAVENOUS ONCE
Status: COMPLETED | OUTPATIENT
Start: 2020-05-12 | End: 2020-05-12

## 2020-05-12 RX ORDER — SODIUM CHLORIDE 0.9 % (FLUSH) 0.9 %
10 SYRINGE (ML) INJECTION AS NEEDED
Status: DISCONTINUED | OUTPATIENT
Start: 2020-05-12 | End: 2020-05-19 | Stop reason: HOSPADM

## 2020-05-12 RX ORDER — NICOTINE POLACRILEX 4 MG
15 LOZENGE BUCCAL
Status: DISCONTINUED | OUTPATIENT
Start: 2020-05-12 | End: 2020-05-19 | Stop reason: HOSPADM

## 2020-05-12 RX ORDER — DEXTROSE AND SODIUM CHLORIDE 5; .9 G/100ML; G/100ML
125 INJECTION, SOLUTION INTRAVENOUS CONTINUOUS
Status: DISCONTINUED | OUTPATIENT
Start: 2020-05-12 | End: 2020-05-12

## 2020-05-12 RX ORDER — DEXTROSE MONOHYDRATE 25 G/50ML
25 INJECTION, SOLUTION INTRAVENOUS
Status: DISCONTINUED | OUTPATIENT
Start: 2020-05-12 | End: 2020-05-19 | Stop reason: HOSPADM

## 2020-05-12 RX ORDER — ONDANSETRON 2 MG/ML
4 INJECTION INTRAMUSCULAR; INTRAVENOUS ONCE
Status: COMPLETED | OUTPATIENT
Start: 2020-05-12 | End: 2020-05-12

## 2020-05-12 RX ADMIN — MORPHINE SULFATE 2 MG: 2 INJECTION, SOLUTION INTRAMUSCULAR; INTRAVENOUS at 20:18

## 2020-05-12 RX ADMIN — SODIUM CHLORIDE 1000 ML: 9 INJECTION, SOLUTION INTRAVENOUS at 20:27

## 2020-05-12 RX ADMIN — PIPERACILLIN AND TAZOBACTAM 3.38 G: 3; .375 INJECTION, POWDER, FOR SOLUTION INTRAVENOUS at 23:36

## 2020-05-12 RX ADMIN — DEXTROSE AND SODIUM CHLORIDE 125 ML/HR: 5; 900 INJECTION, SOLUTION INTRAVENOUS at 23:25

## 2020-05-12 RX ADMIN — ONDANSETRON 4 MG: 2 INJECTION INTRAMUSCULAR; INTRAVENOUS at 20:18

## 2020-05-13 ENCOUNTER — APPOINTMENT (OUTPATIENT)
Dept: GENERAL RADIOLOGY | Facility: HOSPITAL | Age: 75
End: 2020-05-13

## 2020-05-13 LAB
ALBUMIN SERPL-MCNC: 3.42 G/DL (ref 3.5–5.2)
ALBUMIN/GLOB SERPL: 1.1 G/DL
ALP SERPL-CCNC: 103 U/L (ref 39–117)
ALT SERPL W P-5'-P-CCNC: 18 U/L (ref 1–33)
ANION GAP SERPL CALCULATED.3IONS-SCNC: 10 MMOL/L (ref 5–15)
AST SERPL-CCNC: 24 U/L (ref 1–32)
BACTERIA SPEC AEROBE CULT: NORMAL
BASOPHILS # BLD AUTO: 0.03 10*3/MM3 (ref 0–0.2)
BASOPHILS NFR BLD AUTO: 0.4 % (ref 0–1.5)
BILIRUB SERPL-MCNC: 0.3 MG/DL (ref 0.2–1.2)
BUN BLD-MCNC: 15 MG/DL (ref 8–23)
BUN/CREAT SERPL: 17.6 (ref 7–25)
CALCIUM SPEC-SCNC: 8.2 MG/DL (ref 8.6–10.5)
CHLORIDE SERPL-SCNC: 106 MMOL/L (ref 98–107)
CO2 SERPL-SCNC: 21 MMOL/L (ref 22–29)
CREAT BLD-MCNC: 0.85 MG/DL (ref 0.57–1)
DEPRECATED RDW RBC AUTO: 42.3 FL (ref 37–54)
EOSINOPHIL # BLD AUTO: 0.27 10*3/MM3 (ref 0–0.4)
EOSINOPHIL NFR BLD AUTO: 3.5 % (ref 0.3–6.2)
ERYTHROCYTE [DISTWIDTH] IN BLOOD BY AUTOMATED COUNT: 13.1 % (ref 12.3–15.4)
GFR SERPL CREATININE-BSD FRML MDRD: 66 ML/MIN/1.73
GLOBULIN UR ELPH-MCNC: 3 GM/DL
GLUCOSE BLD-MCNC: 116 MG/DL (ref 65–99)
GLUCOSE BLDC GLUCOMTR-MCNC: 101 MG/DL (ref 70–130)
GLUCOSE BLDC GLUCOMTR-MCNC: 80 MG/DL (ref 70–130)
GLUCOSE BLDC GLUCOMTR-MCNC: 87 MG/DL (ref 70–130)
GLUCOSE BLDC GLUCOMTR-MCNC: 94 MG/DL (ref 70–130)
GLUCOSE BLDC GLUCOMTR-MCNC: 96 MG/DL (ref 70–130)
GLUCOSE BLDC GLUCOMTR-MCNC: 99 MG/DL (ref 70–130)
HBA1C MFR BLD: 6.6 % (ref 4.8–5.6)
HCT VFR BLD AUTO: 39.6 % (ref 34–46.6)
HGB BLD-MCNC: 12.2 G/DL (ref 12–15.9)
IMM GRANULOCYTES # BLD AUTO: 0.04 10*3/MM3 (ref 0–0.05)
IMM GRANULOCYTES NFR BLD AUTO: 0.5 % (ref 0–0.5)
LYMPHOCYTES # BLD AUTO: 1.42 10*3/MM3 (ref 0.7–3.1)
LYMPHOCYTES NFR BLD AUTO: 18.5 % (ref 19.6–45.3)
MCH RBC QN AUTO: 27.1 PG (ref 26.6–33)
MCHC RBC AUTO-ENTMCNC: 30.8 G/DL (ref 31.5–35.7)
MCV RBC AUTO: 88 FL (ref 79–97)
MONOCYTES # BLD AUTO: 0.88 10*3/MM3 (ref 0.1–0.9)
MONOCYTES NFR BLD AUTO: 11.5 % (ref 5–12)
NEUTROPHILS # BLD AUTO: 5.02 10*3/MM3 (ref 1.7–7)
NEUTROPHILS NFR BLD AUTO: 65.6 % (ref 42.7–76)
NRBC BLD AUTO-RTO: 0 /100 WBC (ref 0–0.2)
PLATELET # BLD AUTO: 193 10*3/MM3 (ref 140–450)
PMV BLD AUTO: 11.5 FL (ref 6–12)
POTASSIUM BLD-SCNC: 3.9 MMOL/L (ref 3.5–5.2)
PROT SERPL-MCNC: 6.4 G/DL (ref 6–8.5)
RBC # BLD AUTO: 4.5 10*6/MM3 (ref 3.77–5.28)
SARS-COV-2 RNA RESP QL NAA+PROBE: NOT DETECTED
SODIUM BLD-SCNC: 137 MMOL/L (ref 136–145)
WBC NRBC COR # BLD: 7.66 10*3/MM3 (ref 3.4–10.8)

## 2020-05-13 PROCEDURE — 82962 GLUCOSE BLOOD TEST: CPT

## 2020-05-13 PROCEDURE — 99223 1ST HOSP IP/OBS HIGH 75: CPT | Performed by: HOSPITALIST

## 2020-05-13 PROCEDURE — 93010 ELECTROCARDIOGRAM REPORT: CPT | Performed by: INTERNAL MEDICINE

## 2020-05-13 PROCEDURE — 94799 UNLISTED PULMONARY SVC/PX: CPT

## 2020-05-13 PROCEDURE — 99222 1ST HOSP IP/OBS MODERATE 55: CPT | Performed by: SURGERY

## 2020-05-13 PROCEDURE — G0108 DIAB MANAGE TRN  PER INDIV: HCPCS

## 2020-05-13 PROCEDURE — 71045 X-RAY EXAM CHEST 1 VIEW: CPT

## 2020-05-13 PROCEDURE — 85025 COMPLETE CBC W/AUTO DIFF WBC: CPT | Performed by: HOSPITALIST

## 2020-05-13 PROCEDURE — 80053 COMPREHEN METABOLIC PANEL: CPT | Performed by: HOSPITALIST

## 2020-05-13 PROCEDURE — 25010000002 PIPERACILLIN SOD-TAZOBACTAM PER 1 G: Performed by: HOSPITALIST

## 2020-05-13 PROCEDURE — 25010000002 MORPHINE PER 10 MG: Performed by: FAMILY MEDICINE

## 2020-05-13 RX ORDER — FERROUS SULFATE 325(65) MG
325 TABLET ORAL
Status: CANCELLED | OUTPATIENT
Start: 2020-05-13

## 2020-05-13 RX ORDER — BUMETANIDE 1 MG/1
1 TABLET ORAL DAILY PRN
Status: CANCELLED | OUTPATIENT
Start: 2020-05-13

## 2020-05-13 RX ORDER — POTASSIUM CHLORIDE 20 MEQ/1
20 TABLET, EXTENDED RELEASE ORAL DAILY
Status: CANCELLED | OUTPATIENT
Start: 2020-05-13

## 2020-05-13 RX ORDER — PANTOPRAZOLE SODIUM 40 MG/10ML
40 INJECTION, POWDER, LYOPHILIZED, FOR SOLUTION INTRAVENOUS
Status: DISCONTINUED | OUTPATIENT
Start: 2020-05-14 | End: 2020-05-16

## 2020-05-13 RX ORDER — SODIUM CHLORIDE 0.9 % (FLUSH) 0.9 %
10 SYRINGE (ML) INJECTION AS NEEDED
Status: DISCONTINUED | OUTPATIENT
Start: 2020-05-13 | End: 2020-05-19 | Stop reason: HOSPADM

## 2020-05-13 RX ORDER — LORATADINE 10 MG/1
10 TABLET ORAL DAILY PRN
COMMUNITY

## 2020-05-13 RX ORDER — HYDROXYZINE HYDROCHLORIDE 25 MG/1
25 TABLET, FILM COATED ORAL NIGHTLY
Status: CANCELLED | OUTPATIENT
Start: 2020-05-13

## 2020-05-13 RX ORDER — HYDROXYZINE PAMOATE 25 MG/1
25 CAPSULE ORAL NIGHTLY
COMMUNITY
End: 2021-11-22 | Stop reason: HOSPADM

## 2020-05-13 RX ORDER — CETIRIZINE HYDROCHLORIDE 10 MG/1
10 TABLET ORAL DAILY PRN
Status: CANCELLED | OUTPATIENT
Start: 2020-05-13

## 2020-05-13 RX ORDER — PANTOPRAZOLE SODIUM 40 MG/1
40 TABLET, DELAYED RELEASE ORAL DAILY PRN
Status: CANCELLED | OUTPATIENT
Start: 2020-05-13

## 2020-05-13 RX ORDER — POTASSIUM CHLORIDE 20 MEQ/1
20 TABLET, EXTENDED RELEASE ORAL DAILY
COMMUNITY

## 2020-05-13 RX ORDER — PANTOPRAZOLE SODIUM 40 MG/1
40 TABLET, DELAYED RELEASE ORAL DAILY PRN
COMMUNITY

## 2020-05-13 RX ORDER — BUMETANIDE 1 MG/1
1 TABLET ORAL DAILY PRN
COMMUNITY
End: 2021-10-22 | Stop reason: SDUPTHER

## 2020-05-13 RX ORDER — NALOXONE HCL 0.4 MG/ML
0.4 VIAL (ML) INJECTION
Status: DISCONTINUED | OUTPATIENT
Start: 2020-05-13 | End: 2020-05-19 | Stop reason: HOSPADM

## 2020-05-13 RX ORDER — METOPROLOL TARTRATE 50 MG/1
50 TABLET, FILM COATED ORAL DAILY
Status: CANCELLED | OUTPATIENT
Start: 2020-05-13

## 2020-05-13 RX ORDER — SODIUM CHLORIDE 9 MG/ML
75 INJECTION, SOLUTION INTRAVENOUS CONTINUOUS
Status: DISCONTINUED | OUTPATIENT
Start: 2020-05-13 | End: 2020-05-17

## 2020-05-13 RX ORDER — BUDESONIDE AND FORMOTEROL FUMARATE DIHYDRATE 80; 4.5 UG/1; UG/1
2 AEROSOL RESPIRATORY (INHALATION)
Status: CANCELLED | OUTPATIENT
Start: 2020-05-13

## 2020-05-13 RX ORDER — METOPROLOL TARTRATE 5 MG/5ML
2.5 INJECTION INTRAVENOUS EVERY 6 HOURS SCHEDULED
Status: DISCONTINUED | OUTPATIENT
Start: 2020-05-13 | End: 2020-05-16

## 2020-05-13 RX ORDER — IPRATROPIUM BROMIDE AND ALBUTEROL SULFATE 2.5; .5 MG/3ML; MG/3ML
3 SOLUTION RESPIRATORY (INHALATION) EVERY 6 HOURS PRN
Status: DISCONTINUED | OUTPATIENT
Start: 2020-05-13 | End: 2020-05-16

## 2020-05-13 RX ORDER — BUDESONIDE AND FORMOTEROL FUMARATE DIHYDRATE 80; 4.5 UG/1; UG/1
2 AEROSOL RESPIRATORY (INHALATION)
Status: DISCONTINUED | OUTPATIENT
Start: 2020-05-13 | End: 2020-05-19 | Stop reason: HOSPADM

## 2020-05-13 RX ORDER — ATORVASTATIN CALCIUM 20 MG/1
20 TABLET, FILM COATED ORAL DAILY
Status: CANCELLED | OUTPATIENT
Start: 2020-05-13

## 2020-05-13 RX ORDER — METOPROLOL TARTRATE 50 MG/1
50 TABLET, FILM COATED ORAL DAILY
Status: ON HOLD | COMMUNITY
End: 2020-05-19 | Stop reason: SDUPTHER

## 2020-05-13 RX ORDER — SODIUM CHLORIDE 0.9 % (FLUSH) 0.9 %
10 SYRINGE (ML) INJECTION EVERY 12 HOURS SCHEDULED
Status: DISCONTINUED | OUTPATIENT
Start: 2020-05-13 | End: 2020-05-19 | Stop reason: HOSPADM

## 2020-05-13 RX ORDER — MORPHINE SULFATE 2 MG/ML
1 INJECTION, SOLUTION INTRAMUSCULAR; INTRAVENOUS EVERY 4 HOURS PRN
Status: DISCONTINUED | OUTPATIENT
Start: 2020-05-13 | End: 2020-05-19 | Stop reason: HOSPADM

## 2020-05-13 RX ADMIN — PIPERACILLIN SODIUM AND TAZOBACTAM SODIUM 3.38 G: 3; .375 INJECTION, POWDER, LYOPHILIZED, FOR SOLUTION INTRAVENOUS at 11:38

## 2020-05-13 RX ADMIN — PIPERACILLIN SODIUM AND TAZOBACTAM SODIUM 3.38 G: 3; .375 INJECTION, POWDER, LYOPHILIZED, FOR SOLUTION INTRAVENOUS at 20:26

## 2020-05-13 RX ADMIN — SODIUM CHLORIDE, PRESERVATIVE FREE 10 ML: 5 INJECTION INTRAVENOUS at 09:04

## 2020-05-13 RX ADMIN — SODIUM CHLORIDE, PRESERVATIVE FREE 10 ML: 5 INJECTION INTRAVENOUS at 20:27

## 2020-05-13 RX ADMIN — PIPERACILLIN SODIUM AND TAZOBACTAM SODIUM 3.38 G: 3; .375 INJECTION, POWDER, LYOPHILIZED, FOR SOLUTION INTRAVENOUS at 05:30

## 2020-05-13 RX ADMIN — MORPHINE SULFATE 2 MG: 2 INJECTION, SOLUTION INTRAMUSCULAR; INTRAVENOUS at 00:01

## 2020-05-13 RX ADMIN — BUDESONIDE AND FORMOTEROL FUMARATE DIHYDRATE 2 PUFF: 80; 4.5 AEROSOL RESPIRATORY (INHALATION) at 13:05

## 2020-05-13 RX ADMIN — SODIUM CHLORIDE 100 ML/HR: 9 INJECTION, SOLUTION INTRAVENOUS at 00:53

## 2020-05-13 NOTE — CONSULTS
Performed diabetes consult.  Refer to diabetes history and assessment.  No needs identified by patient at this time.

## 2020-05-13 NOTE — PROGRESS NOTES
Assisted By: Essence RAMIREZ    CC: F/U SBO    Interview History/HPI: Patient states she is feeling better than presentation.  She was having abdominal pain sharp mostly left lower quadrant but it is now just a dull ache.  She has an NG tube in place that is functioning.  She does not remember necessarily her last bowel movement but states it was liquid.  She denies chest pain and no shortness of breath, she quit smoking remotely in the past, she has as needed home oxygen but has not had to use this recently.  She denies any chest pain and in fact cardiac catheterization noted from 2017 that was absent of any coronary disease.  She is chronically anticoagulated for paroxysmal atrial fibrillation supposedly however pharmacy has had difficulty tracking down any evidence the patient currently still takes this.      Vitals:    05/13/20 0958   BP: 105/62   Pulse: 64   Resp: 18   Temp: 97.6 °F (36.4 °C)   SpO2: 93%         Intake/Output Summary (Last 24 hours) at 5/13/2020 1155  Last data filed at 5/13/2020 1125  Gross per 24 hour   Intake 1642 ml   Output 950 ml   Net 692 ml       EXAM: Patient is pleasant and in no distress saturation 96% on room air currently.  Monitor showing sinus rhythm, lungs have bilateral breath sounds are clear no rhonchi rales or wheezing heard, heart regular rate and rhythm without murmur rub or gallop.  Abdomen is nondistended and soft minimal diffuse tenderness to deep palpation without rebound or guarding, rare bowel sound heard.      EKG: Image reviewed sinus rhythm with a left axis deviation, somewhat poor R wave progression across precordial leads, unchanged since December    Tele: Sinus rhythm    LABS:   Lab Results (last 48 hours)     Procedure Component Value Units Date/Time    POC Glucose Once [[id number]]  (Normal) Collected:  05/13/20 1000    Specimen:  Blood Updated:  05/13/20 1038     Glucose 94 mg/dL     POC Glucose Once [[id number]]  (Normal) Collected:  05/13/20 0641    Specimen:   Blood Updated:  05/13/20 0656     Glucose 101 mg/dL     Comprehensive Metabolic Panel [[id number]]  (Abnormal) Collected:  05/13/20 0422    Specimen:  Blood Updated:  05/13/20 0502     Glucose 116 mg/dL      BUN 15 mg/dL      Creatinine 0.85 mg/dL      Sodium 137 mmol/L      Potassium 3.9 mmol/L      Chloride 106 mmol/L      CO2 21.0 mmol/L      Calcium 8.2 mg/dL      Total Protein 6.4 g/dL      Albumin 3.42 g/dL      ALT (SGPT) 18 U/L      AST (SGOT) 24 U/L      Alkaline Phosphatase 103 U/L      Total Bilirubin 0.3 mg/dL      eGFR Non African Amer 66 mL/min/1.73      Globulin 3.0 gm/dL      A/G Ratio 1.1 g/dL      BUN/Creatinine Ratio 17.6     Anion Gap 10.0 mmol/L     Narrative:       GFR Normal >60  Chronic Kidney Disease <60  Kidney Failure <15      CBC Auto Differential [[id number]]  (Abnormal) Collected:  05/13/20 0422    Specimen:  Blood Updated:  05/13/20 0437     WBC 7.66 10*3/mm3      RBC 4.50 10*6/mm3      Hemoglobin 12.2 g/dL      Hematocrit 39.6 %      MCV 88.0 fL      MCH 27.1 pg      MCHC 30.8 g/dL      RDW 13.1 %      RDW-SD 42.3 fl      MPV 11.5 fL      Platelets 193 10*3/mm3      Neutrophil % 65.6 %      Lymphocyte % 18.5 %      Monocyte % 11.5 %      Eosinophil % 3.5 %      Basophil % 0.4 %      Immature Grans % 0.5 %      Neutrophils, Absolute 5.02 10*3/mm3      Lymphocytes, Absolute 1.42 10*3/mm3      Monocytes, Absolute 0.88 10*3/mm3      Eosinophils, Absolute 0.27 10*3/mm3      Basophils, Absolute 0.03 10*3/mm3      Immature Grans, Absolute 0.04 10*3/mm3      nRBC 0.0 /100 WBC     POC Glucose Once [[id number]]  (Normal) Collected:  05/13/20 0054    Specimen:  Blood Updated:  05/13/20 0100     Glucose 99 mg/dL     [facility] In-House SARS-CoV-2, NAAT, NP Swab (NO TRANSPORT MEDIA) - Swab, Nasopharynx [[id number]]  (Normal) Collected:  05/12/20 2346    Specimen:  Swab from Nasopharynx Updated:  05/13/20 0013     COVID19 Not Detected    Hemoglobin A1c [[id number]]  (Abnormal) Collected:  05/12/20 2001     Specimen:  Blood from Arm, Left Updated:  05/13/20 0009     Hemoglobin A1C 6.60 %     Narrative:       Hemoglobin A1C Ranges:    Increased Risk for Diabetes  5.7% to 6.4%  Diabetes                     >= 6.5%  Diabetic Goal                < 7.0%    POC Glucose Once [[id number]]  (Normal) Collected:  05/12/20 2358    Specimen:  Blood Updated:  05/13/20 0005     Glucose 96 mg/dL     Lactic Acid, Plasma [[id number]]  (Normal) Collected:  05/12/20 2317    Specimen:  Blood from Arm, Right Updated:  05/12/20 2337     Lactate 0.7 mmol/L     Blood Culture - Blood, Arm, Left [[id number]] Collected:  05/12/20 2330    Specimen:  Blood from Arm, Left Updated:  05/12/20 2335    Blood Culture - Blood, Arm, Right [[id number]] Collected:  05/12/20 2317    Specimen:  Blood from Arm, Right Updated:  05/12/20 2319    Troponin [[id number]]  (Normal) Collected:  05/12/20 2001    Specimen:  Blood from Arm, Left Updated:  05/12/20 2309     Troponin T <0.010 ng/mL     Narrative:       Troponin T Reference Range:  <= 0.03 ng/mL-   Negative for AMI  >0.03 ng/mL-     Abnormal for myocardial necrosis.  Clinicians would have to utilize clinical acumen, EKG, Troponin and serial changes to determine if it is an Acute Myocardial Infarction or myocardial injury due to an underlying chronic condition.       Results may be falsely decreased if patient taking Biotin.      [facility] Draw [[id number]] Collected:  05/12/20 2001    Specimen:  Blood from Arm, Left Updated:  05/12/20 2115    Narrative:       The following orders were created for panel order [facility] Draw.  Procedure                               Abnormality         Status                     ---------                               -----------         ------                     Light Blue Top[[id number]]                                   Final result               Green Top (Gel)[[id number]]                                  Final result               Lavender Top[[id number]]                                      Final result               Gold Top - SST[[id number]]                                   Final result                 Please view results for these tests on the individual orders.    Light Blue Top [[id number]] Collected:  05/12/20 2001    Specimen:  Blood from Arm, Left Updated:  05/12/20 2115     Extra Tube hold for add-on     Comment: Auto resulted       Green Top (Gel) [[id number]] Collected:  05/12/20 2001    Specimen:  Blood from Arm, Left Updated:  05/12/20 2115     Extra Tube Hold for add-ons.     Comment: Auto resulted.       Lavender Top [[id number]] Collected:  05/12/20 2001    Specimen:  Blood from Arm, Left Updated:  05/12/20 2115     Extra Tube hold for add-on     Comment: Auto resulted       Gold Top - SST [[id number]] Collected:  05/12/20 2001    Specimen:  Blood from Arm, Left Updated:  05/12/20 2115     Extra Tube Hold for add-ons.     Comment: Auto resulted.       Urinalysis With Culture If Indicated - Urine, Clean Catch [[id number]]  (Abnormal) Collected:  05/12/20 2044    Specimen:  Urine, Clean Catch Updated:  05/12/20 2051     Color, UA Yellow     Appearance, UA Cloudy     pH, UA <=5.0     Specific Gravity, UA 1.028     Glucose, UA Negative     Ketones, UA Trace     Bilirubin, UA Negative     Blood, UA Negative     Protein, UA Negative     Leuk Esterase, UA Trace     Nitrite, UA Negative     Urobilinogen, UA 0.2 E.U./dL    Urinalysis, Microscopic Only - Urine, Clean Catch [[id number]]  (Abnormal) Collected:  05/12/20 2044    Specimen:  Urine, Clean Catch Updated:  05/12/20 2051     RBC, UA 0-2 /HPF      WBC, UA 13-20 /HPF      Bacteria, UA 1+ /HPF      Squamous Epithelial Cells, UA 13-20 /HPF      Hyaline Casts, UA 3-6 /LPF      Methodology Automated Microscopy    Urine Culture - Urine, Urine, Clean Catch [[id number]] Collected:  05/12/20 2044    Specimen:  Urine, Clean Catch Updated:  05/12/20 2051    Comprehensive Metabolic Panel [[id number]]  (Abnormal) Collected:  05/12/20 2001    Specimen:   Blood from Arm, Left Updated:  05/12/20 2026     Glucose 111 mg/dL      BUN 17 mg/dL      Creatinine 0.98 mg/dL      Sodium 137 mmol/L      Potassium 4.2 mmol/L      Chloride 101 mmol/L      CO2 23.2 mmol/L      Calcium 9.1 mg/dL      Total Protein 7.2 g/dL      Albumin 4.08 g/dL      ALT (SGPT) 15 U/L      AST (SGOT) 15 U/L      Alkaline Phosphatase 111 U/L      Total Bilirubin 0.3 mg/dL      eGFR Non African Amer 56 mL/min/1.73      Globulin 3.1 gm/dL      A/G Ratio 1.3 g/dL      BUN/Creatinine Ratio 17.3     Anion Gap 12.8 mmol/L     Narrative:       GFR Normal >60  Chronic Kidney Disease <60  Kidney Failure <15      Lipase [[id number]]  (Normal) Collected:  05/12/20 2001    Specimen:  Blood from Arm, Left Updated:  05/12/20 2026     Lipase 15 U/L     C-reactive Protein [[id number]]  (Normal) Collected:  05/12/20 2001    Specimen:  Blood from Arm, Left Updated:  05/12/20 2026     C-Reactive Protein 0.27 mg/dL     Magnesium [[id number]]  (Normal) Collected:  05/12/20 2001    Specimen:  Blood from Arm, Left Updated:  05/12/20 2026     Magnesium 1.8 mg/dL     CBC & Differential [[id number]] Collected:  05/12/20 2001    Specimen:  Blood from Arm, Left Updated:  05/12/20 2006    Narrative:       The following orders were created for panel order CBC & Differential.  Procedure                               Abnormality         Status                     ---------                               -----------         ------                     CBC Auto Differential[[id number]]        Abnormal            Final result                 Please view results for these tests on the individual orders.    CBC Auto Differential [[id number]]  (Abnormal) Collected:  05/12/20 2001    Specimen:  Blood from Arm, Left Updated:  05/12/20 2006     WBC 6.77 10*3/mm3      RBC 4.84 10*6/mm3      Hemoglobin 13.2 g/dL      Hematocrit 41.6 %      MCV 86.0 fL      MCH 27.3 pg      MCHC 31.7 g/dL      RDW 13.1 %      RDW-SD 40.7 fl      MPV 11.5 fL       Platelets 217 10*3/mm3      Neutrophil % 51.3 %      Lymphocyte % 30.7 %      Monocyte % 12.1 %      Eosinophil % 4.9 %      Basophil % 0.6 %      Immature Grans % 0.4 %      Neutrophils, Absolute 3.47 10*3/mm3      Lymphocytes, Absolute 2.08 10*3/mm3      Monocytes, Absolute 0.82 10*3/mm3      Eosinophils, Absolute 0.33 10*3/mm3      Basophils, Absolute 0.04 10*3/mm3      Immature Grans, Absolute 0.03 10*3/mm3      nRBC 0.0 /100 WBC           Radiology:    Imaging Results (Last 72 Hours)     Procedure Component Value Units Date/Time    XR Chest 1 View [[id number]] Collected:  05/13/20 0043     Updated:  05/13/20 0045    Narrative:       CR Chest 1 Vw    INDICATION:   Evaluate nasogastric tube placement     COMPARISON:    None available.    FINDINGS:  Single portable AP view(s) of the chest.    This image is centered over the diaphragms. The nasogastric is tip in the body the stomach. The visible lungs are clear    Signer Name: El Goldberg MD   Signed: 5/13/2020 12:43 AM   Workstation Name: [email or web address]    [facility]    CT Abdomen Pelvis Without Contrast [[id number]] Collected:  05/12/20 2234     Updated:  05/12/20 2236    Narrative:       CT Abdomen Pelvis WO    INDICATION:   History of hernia with left lower quadrant pain tonight    TECHNIQUE:   CT of the abdomen and pelvis without IV contrast. Coronal and sagittal reconstructions were obtained.  Radiation dose reduction techniques included automated exposure control or exposure modulation based on body size. Count of known CT and cardiac nuc  med studies performed in previous 12 months: 1.     COMPARISON:   None available    FINDINGS:  Abdomen: The lung bases are clear. The gallbladder is been removed. Liver, spleen, pancreas, adrenal glands and kidneys are normal. Colon appears normal. The jejunum shows abnormal distention proximally. It measures up to 5.7 cm transverse dimension.  There is an abdominal wall hernia to the right of the  umbilicus and this has small bowel within it. This appears be causing partial obstruction. The bowel proximal to the hernia is distended in the bowel distal to the hernia is collapsed.    Pelvis: Uterus and adnexal regions and bladder are normal. Bones are unremarkable.      Impression:       There appears to be a high-grade small bowel obstruction caused by a small hernia through the abdominal wall to the right of the umbilicus. The opening through the abdominal wall is about 2 cm in diameter. The jejunum proximal to this hernia is dilated  and the small bowel distal to this hernia is collapsed.    Otherwise negative study          Signer Name: El Goldberg MD   Signed: 5/12/2020 10:34 PM   Workstation Name: [email or web address]    [facility]          Results for orders placed during the hospital encounter of 01/25/19   Adult Transthoracic Echo Complete W/ Cont if Necessary Per Protocol    Narrative · A trivial pericardial effusion noted. Adjacent to the right ventricle.  · Right ventricular cavity is borderline dilated.  · Estimated EF = 60%.  · Left ventricular systolic function is normal.  · No prev echo          Assessment/Plan:   Small bowel obstruction secondary to small hernia in the abdominal wall, NG tube in place and abdomen has been decompressed and she feels better.  Noted plans for surgery tomorrow per .  Continue IV fluids    Paroxysmal atrial fibrillation.  As far as anticoagulation we are unable to verify she is taking this, I tried to call her power of 's and could not reach them.  In any event the last dose would have been yesterday planned surgery for tomorrow.  Pharmacy is assisting to see if this patient has actually been taking her anticoagulant.  She is maintaining sinus rhythm at this time.    DVT prophylaxis, will place SCUDs    Noted patient was on Lopressor, she cannot take p.o. at this time, I am going to dose low-dose Lopressor IV every 6 hours with  appropriate hold parameters to cover perioperative.    Other nonessential medications will be held because she is n.p.o., have changed her PPI to IV.  (Of note she was on 2 PPIs at home.)    Reported COPD, no exacerbation.    Diabetes, currently controlled off medication as she is n.p.o.  A1c was 6.6.

## 2020-05-13 NOTE — PLAN OF CARE
Pt arrived to floor from ER. Complains of abdomen pain, states that it is getting better. No other complaints at this time. Will continue to follow plan of care and monitor.

## 2020-05-13 NOTE — ED PROVIDER NOTES
Subjective     History provided by:  Patient  Abdominal Pain   Pain location:  RLQ  Pain quality: gnawing, sharp and stabbing    Pain radiates to:  Does not radiate  Pain severity:  Moderate  Onset quality:  Sudden  Duration:  2 days  Timing:  Constant  Progression:  Worsening  Chronicity:  Recurrent (Patient was scheduled to have hernia repair on April 2 however the surgery was canceled secondary to COVID)  Relieved by:  Nothing  Associated symptoms: no chest pain, no dysuria and no fever    Risk factors: being elderly and obesity        Review of Systems   Constitutional: Negative.  Negative for fever.   HENT: Negative.    Respiratory: Negative.    Cardiovascular: Negative.  Negative for chest pain.   Gastrointestinal: Positive for abdominal distention and abdominal pain.   Endocrine: Negative.    Genitourinary: Negative.  Negative for dysuria.   Skin: Negative.    Neurological: Negative.    Psychiatric/Behavioral: Negative.    All other systems reviewed and are negative.      Past Medical History:   Diagnosis Date   • Collapsed lung    • COPD (chronic obstructive pulmonary disease) (CMS/HCC)    • Diabetes mellitus (CMS/HCC)    • GERD (gastroesophageal reflux disease)    • Hyperlipidemia    • Hypertension    • Myocardial infarction (CMS/HCC)    • Stroke (CMS/[facility])        No Known Allergies    Past Surgical History:   Procedure Laterality Date   • CARDIAC CATHETERIZATION N/A 8/22/2017    Procedure: Left Heart Cath;  Surgeon: Jatinder Matias MD;  Location: [facility] CATH INVASIVE LOCATION;  Service:    • GALLBLADDER SURGERY         Family History   Problem Relation Age of Onset   • Heart disease Mother    • Heart disease Father        Social History     Socioeconomic History   • Marital status: Single     Spouse name: Not on file   • Number of children: Not on file   • Years of education: Not on file   • Highest education level: Not on file   Tobacco Use   • Smoking status: Former Smoker     Packs/day: 3.00     Types:  Cigarettes     Last attempt to quit: 2015     Years since quittin.3   • Smokeless tobacco: Never Used   Substance and Sexual Activity   • Alcohol use: No   • Drug use: No   • Sexual activity: Defer           Objective   Physical Exam   Constitutional: She is oriented to person, place, and time. She appears well-developed and well-nourished. No distress.   HENT:   Head: Normocephalic and atraumatic.   Right Ear: External ear normal.   Left Ear: External ear normal.   Nose: Nose normal.   Eyes: Conjunctivae are normal.   Neck: Normal range of motion. Neck supple. No JVD present. No tracheal deviation present.   Cardiovascular: Normal rate, regular rhythm and normal heart sounds.   No murmur heard.  Pulmonary/Chest: Effort normal and breath sounds normal. No respiratory distress. She has no wheezes.   Abdominal: Soft. Bowel sounds are decreased. There is tenderness in the right lower quadrant.   Musculoskeletal: Normal range of motion. She exhibits no edema or deformity.   Neurological: She is alert and oriented to person, place, and time. No cranial nerve deficit.   Skin: Skin is warm and dry. No rash noted. She is not diaphoretic. No erythema. No pallor.   Psychiatric: She has a normal mood and affect. Her behavior is normal. Thought content normal.   Nursing note and vitals reviewed.      Procedures           ED Course  ED Course as of May 12 2332   Tue May 12, 2020   2238 CT abd pelvis rad interpreted:  There appears to be a high-grade small bowel obstruction caused by a small hernia through the abdominal wall to the right of the umbilicus. The opening through the abdominal wall is about 2 cm in diameter. The jejunum proximal to this hernia is dilated  and the small bowel distal to this hernia is collapsed.     Otherwise negative study    [RB]   2240 Patient seen and examined patient with a bowel obstruction patient is tender around her umbilicus.  However without palpation patient appears comfortable at this  time.  Agree with assessment and plan.    [BB]   2241 This case with on-call general surgeon Dr. Bonner, patient is to be admitted NG tube placed.  Requests rapid COVID test secondary to pending surgical procedure.     [RB]   2331 Pt accepted for admission per Dr. Prabhakar.     [RB]      ED Course User Index  [BB] Melvin Larson MD  [RB] Brad Stahl II, PA                                           MDM  Number of Diagnoses or Management Options  SBO (small bowel obstruction) (CMS/HCC): established and worsening     Amount and/or Complexity of Data Reviewed  Clinical lab tests: ordered and reviewed  Tests in the radiology section of CPT®: ordered and reviewed  Discuss the patient with other providers: yes    Risk of Complications, Morbidity, and/or Mortality  Presenting problems: moderate  Diagnostic procedures: moderate  Management options: moderate    Patient Progress  Patient progress: stable      Final diagnoses:   SBO (small bowel obstruction) (CMS/HCC)            Brad Stahl II, PA  05/12/20 5152       Brad Stahl II, PA  05/12/20 1258

## 2020-05-13 NOTE — PROGRESS NOTES
Pharmacy to dose Zosyn for intra abdominal infection:  CrCl = 55.  Dosed as follows:  Zosyn 3.375gm iv ext infusion q8h.  Pharmacy will monitor and adjust dosing as necessary.     Daniel Yeh RPH  00:54  05/13/20

## 2020-05-13 NOTE — H&P
Hospitalist History and Physical        Patient Identification  Name: Mayra Hays  Age/Sex: 74 y.o. female  :  1945        MRN: [id number]  Visit Number: [id number]  PCP: Niki Antony APRN        Chief complaint abdominal pain    History of Present Illness:  Patient is a 74 y.o. female with history of COPD, Type II DM, GERD, HTN, HLD, MI, CVA, paroxysmal atrial fibrillation and known ventral hernia for which she was initially scheduled for elective surgical repair on 2020 but this procedure was postponed due to the COVID-19 pandemic. She presents this evening with complaints of worsening lower abdominal pain and distention of 2-3 weeks duration, which became so severe in the last 24-48 hours that she could no longer stand the pain. She denies fever or chills. She denies urinary symptoms such as dysuria. She reports cough which is chronic, maybe a little worse than usual, but no dyspnea or chest pain .She denies any worsening lower extremity edema from baseline. She reports last solid bowel movement was 2 days ago. Since then she has only had 1 watery bowel movement. She denies nausea or vomiting.      In the ED, vitals were fairly stable with exception of transient hypotension following administration of IV morphine which quickly normalized with IV fluids. Basic labs were also unremarkable. UA showed trace leuk esterase, 13-20 WBC and 1+ bacteria but also contained 13-20 squamous epithelial cells suggesting contaminated sample. CT abdomen/pelvis showed high grade SBO caused by small hernia through the abdominal wall to the right of the umbilicus, the opening of which is about 2 cm in diameter, with jejunum proximal to hernia dilated and small bowel distal to hernia collapsed. Dr Bonner, on-call general surgeon, was notified and recommended rapid COVID-19 testing, NG tube placement, IV zosyn, and admission to hospitalist service.     Review of Systems  Review of Systems   Constitutional: Positive  for activity change and fatigue. Negative for appetite change, chills and fever.   HENT: Negative for congestion, postnasal drip, rhinorrhea, sinus pressure, sinus pain and sore throat.    Eyes: Negative for photophobia, pain, discharge, redness, itching and visual disturbance.   Respiratory: Positive for cough. Negative for shortness of breath and wheezing.    Cardiovascular: Positive for leg swelling (chronic). Negative for chest pain and palpitations.   Gastrointestinal: Positive for abdominal distention, abdominal pain and diarrhea. Negative for constipation, nausea and vomiting.   Endocrine: Negative for cold intolerance, heat intolerance, polydipsia, polyphagia and polyuria.   Genitourinary: Negative for difficulty urinating, dysuria, flank pain, frequency and hematuria.   Musculoskeletal: Negative for arthralgias, back pain, joint swelling, myalgias, neck pain and neck stiffness.   Skin: Negative for color change, pallor, rash and wound.   Neurological: Negative for dizziness, tremors, seizures, syncope, weakness, light-headedness, numbness and headaches.   Hematological: Negative for adenopathy. Does not bruise/bleed easily.   Psychiatric/Behavioral: Negative for agitation, behavioral problems and confusion.       History  Past Medical History:   Diagnosis Date   • Collapsed lung    • COPD (chronic obstructive pulmonary disease) (CMS/[facility])    • Diabetes mellitus (CMS/[facility])    • GERD (gastroesophageal reflux disease)    • Hyperlipidemia    • Hypertension    • Myocardial infarction (CMS/HCC)    • Stroke (CMS/HCC)      *  Paroxysmal atrial fibrillation    Past Surgical History:   Procedure Laterality Date   • CARDIAC CATHETERIZATION N/A 8/22/2017    Procedure: Left Heart Cath;  Surgeon: Jatinder Matias MD;  Location: [facility] INVASIVE LOCATION;  Service:    • GALLBLADDER SURGERY       Family History   Problem Relation Age of Onset   • Heart disease Mother    • Heart disease Father      Social History      Tobacco Use   • Smoking status: Former Smoker     Packs/day: 3.00     Types: Cigarettes     Last attempt to quit: 2015     Years since quittin.3   • Smokeless tobacco: Never Used   Substance Use Topics   • Alcohol use: No   • Drug use: No       (Not in a hospital admission)  Allergies:  Patient has no known allergies.    Objective     Vital Signs  Temp:  [97.9 °F (36.6 °C)] 97.9 °F (36.6 °C)  Heart Rate:  [73-91] 91  Resp:  [16] 16  BP: ()/() 130/79  Body mass index is 29.05 kg/m².    Physical Exam:  Physical Exam   Constitutional: She is oriented to person, place, and time. She appears well-developed and well-nourished.   Pleasant elderly female, no acute distress   HENT:   Head: Normocephalic and atraumatic.   Mouth/Throat: Oropharynx is clear and moist.   NG tube in place, hooked to suction, bilious output in tubing.   Eyes: Pupils are equal, round, and reactive to light. Conjunctivae and EOM are normal.   Neck: Normal range of motion. Neck supple. No JVD present.   Cardiovascular: Normal rate, regular rhythm, normal heart sounds and intact distal pulses.   No murmur heard.  Pulmonary/Chest: Effort normal and breath sounds normal. No respiratory distress. She has no wheezes. She has no rales. She exhibits no tenderness.   Abdominal: She exhibits no mass. There is no rebound and no guarding.   Distended, somewhat firm in lower quadrants, mild tenderness to palpation in lower quadrants, more so to right of umbilicus.   Musculoskeletal: Normal range of motion. She exhibits edema (trace b/l lower extremity edema). She exhibits no tenderness or deformity.   Lymphadenopathy:     She has no cervical adenopathy.   Neurological: She is alert and oriented to person, place, and time.   No gross focal deficits appreciated   Skin: Skin is warm and dry. Capillary refill takes less than 2 seconds. No rash noted. No erythema. No pallor.   Psychiatric: She has a normal mood and affect. Her behavior is normal.  Judgment and thought content normal.         Results Review:       Lab Results:  Results from last 7 days   Lab Units 05/12/20 2001   WBC 10*3/mm3 6.77   HEMOGLOBIN g/dL 13.2   PLATELETS 10*3/mm3 217     Results from last 7 days   Lab Units 05/12/20 2001   CRP mg/dL 0.27     Results from last 7 days   Lab Units 05/12/20 2001   SODIUM mmol/L 137   POTASSIUM mmol/L 4.2   CHLORIDE mmol/L 101   CO2 mmol/L 23.2   BUN mg/dL 17   CREATININE mg/dL 0.98   CALCIUM mg/dL 9.1   GLUCOSE mg/dL 111*     Results from last 7 days   Lab Units 05/12/20 2001   MAGNESIUM mg/dL 1.8     No results found for: HGBA1C  Results from last 7 days   Lab Units 05/12/20 2001   BILIRUBIN mg/dL 0.3   ALK PHOS U/L 111   AST (SGOT) U/L 15   ALT (SGPT) U/L 15     Results from last 7 days   Lab Units 05/12/20 2001   TROPONIN T ng/mL <0.010                   I have reviewed the patient's laboratory results.    Imaging:  Imaging Results (Last 72 Hours)     Procedure Component Value Units Date/Time    CT Abdomen Pelvis Without Contrast [[id number]] Collected:  05/12/20 2234     Updated:  05/12/20 2236    Narrative:       CT Abdomen Pelvis WO    INDICATION:   History of hernia with left lower quadrant pain tonight    TECHNIQUE:   CT of the abdomen and pelvis without IV contrast. Coronal and sagittal reconstructions were obtained.  Radiation dose reduction techniques included automated exposure control or exposure modulation based on body size. Count of known CT and cardiac nuc  med studies performed in previous 12 months: 1.     COMPARISON:   None available    FINDINGS:  Abdomen: The lung bases are clear. The gallbladder is been removed. Liver, spleen, pancreas, adrenal glands and kidneys are normal. Colon appears normal. The jejunum shows abnormal distention proximally. It measures up to 5.7 cm transverse dimension.  There is an abdominal wall hernia to the right of the umbilicus and this has small bowel within it. This appears be causing partial  obstruction. The bowel proximal to the hernia is distended in the bowel distal to the hernia is collapsed.    Pelvis: Uterus and adnexal regions and bladder are normal. Bones are unremarkable.      Impression:       There appears to be a high-grade small bowel obstruction caused by a small hernia through the abdominal wall to the right of the umbilicus. The opening through the abdominal wall is about 2 cm in diameter. The jejunum proximal to this hernia is dilated  and the small bowel distal to this hernia is collapsed.    Otherwise negative study          Signer Name: El Goldberg MD   Signed: 5/12/2020 10:34 PM   Workstation Name: [id number]    [facility]          I have personally reviewed the patient's radiologic imaging.        EKG: ordered, pending          Assessment/Plan     - High grade SBO (small bowel obstruction) (CMS/HCC): admitted to telemetry floor. NG tube placed in ED. Low dose IV morphine available PRN. General surgery aware of patient's admission, Dr Bonner to see in consultation in the morning. IV zosyn added per Dr Bonner's recommendation.    - Paroxysmal atrial fibrillation, history MI/CVA: chronically anticoagulated on eliquis. Last dose was this evening before coming to the ED; general surgery may opt to delay surgical intervention depending on how patient responds to conservative treatment first (ie NG tube placement, IV analgesics, etc). Baseline EKG for admission pending.     - Type II DM, non insulin dependent: check A1c. Monitor accuchecks q6h. If need be, will add SSI later.    - COPD: appears compensated at this time. Patient reports cough a little worse than baseline, but no dyspnea reported and no wheezing on exam. Duonebs ordered for PRN use.    - Possible UTI: presence of large number of squamous epithelial cells again suggests contaminated sample. She does not meet SIRS/sepsis criteria. Urine culture collected in ED, along with blood cultures. On zosyn  empirically for high grade SBO noted above which should offer more than adequate coverage while awaiting culture results.     - HTN: review home meds once reconciled by pharmacy.    DVT Prophylaxis: already chronically anticoagulated on eliquis.     Estimated Length of Stay >2 midnights    I discussed the patient's findings, assessment and plan with the patient and her RN Marga who was present during my interview and exam on [facility].    * Patient is high risk due to high grade SBO, requiring parenteral opioids for pain control, paroxysmal afib on chronic anticoagulation, history MI, history stroke, history COPD, advanced age    Serafin Prabhakar MD  05/13/20  00:02

## 2020-05-13 NOTE — CONSULTS
Consults    Patient Care Team:  Niki Antony APRN as PCP - General (Nurse Practitioner)  Jonathan Del Valle MD as Consulting Physician (Endocrinology)    Chief complaint: Small bowel obstruction    Subjective     74-year-old female with umbilical hernia that had previously been seen after incarceration where it was reduced and she was scheduled for elective surgery.  Unfortunately her surgery was canceled due to the COVID-19 restrictions and she has represented with abdominal pain and signs of bowel obstruction.  Fortunately the hernia is reducible and has been reduced prior to my evaluation.  NG tube is in place.  No peritoneal signs.  She states her abdominal pain is much improved.  She is on chronic anticoagulation but did receive cardiac clearance prior to planning for elective surgery earlier this year.  The patient last dose of anticoagulation was on 5/12/2020.      Review of Systems   Constitutional: Negative for activity change, appetite change, chills and fever.   HENT: Negative for sore throat and trouble swallowing.    Eyes: Negative for visual disturbance.   Respiratory: Negative for cough and shortness of breath.    Cardiovascular: Negative for chest pain and palpitations.   Gastrointestinal: Positive for abdominal pain. Negative for abdominal distention, blood in stool, constipation, diarrhea, nausea and vomiting.   Endocrine: Negative for cold intolerance and heat intolerance.   Genitourinary: Negative for dysuria.   Musculoskeletal: Negative for joint swelling.   Skin: Negative for color change, rash and wound.   Allergic/Immunologic: Negative for immunocompromised state.   Neurological: Negative for dizziness, seizures, weakness and headaches.   Hematological: Negative for adenopathy. Does not bruise/bleed easily.   Psychiatric/Behavioral: Negative for agitation and confusion.        Past Medical History:   Diagnosis Date   • Collapsed lung    • COPD (chronic obstructive pulmonary disease)  (CMS/HCC)    • Diabetes mellitus (CMS/HCC)    • GERD (gastroesophageal reflux disease)    • Hyperlipidemia    • Hypertension    • Myocardial infarction (CMS/HCC)    • Stroke (CMS/HCC)    ,   Past Surgical History:   Procedure Laterality Date   • CARDIAC CATHETERIZATION N/A 2017    Procedure: Left Heart Cath;  Surgeon: Jatinder Matias MD;  Location:  [facility] CATH INVASIVE LOCATION;  Service:    • GALLBLADDER SURGERY     ,   Family History   Problem Relation Age of Onset   • Heart disease Mother    • Heart disease Father    ,   Social History     Tobacco Use   • Smoking status: Former Smoker     Packs/day: 3.00     Types: Cigarettes     Last attempt to quit:      Years since quittin.3   • Smokeless tobacco: Never Used   Substance Use Topics   • Alcohol use: No   • Drug use: No   ,   Medications Prior to Admission   Medication Sig Dispense Refill Last Dose   • albuterol 108 (90 Base) MCG/ACT inhaler Inhale 2 puffs Every 4 (Four) Hours As Needed for Wheezing.   Taking   • apixaban (ELIQUIS) 5 MG tablet tablet Take 5 mg by mouth 2 (Two) Times a Day.   Taking   • atorvastatin (LIPITOR) 20 MG tablet Take 20 mg by mouth Daily.   Taking   • BREO ELLIPTA 100-25 MCG/INH inhaler Inhale 1 puff by mouth once daily. 60 each 0 Taking   • bumetanide (BUMEX) 1 MG tablet Daily as needed for increased swelling or shortness of breath until follow up with PCP.   Taking   • esomeprazole (nexIUM) 40 MG capsule Take 40 mg by mouth Every Morning Before Breakfast.   Taking   • ferrous sulfate 325 (65 FE) MG tablet Take 325 mg by mouth 2 (Two) Times a Day.   Taking   • hydrOXYzine (ATARAX) 25 MG tablet Take 25 mg by mouth Every Night.   Taking   • pantoprazole (PROTONIX) 40 MG EC tablet Take 40 mg by mouth Daily.   Taking   • potassium chloride (K-DUR,KLOR-CON) 20 MEQ CR tablet Daily as needed with Bumex only.   Taking   • promethazine (PHENERGAN) 12.5 MG tablet Take 1 tablet by mouth Every 6 (Six) Hours As Needed for Nausea or  Vomiting. 12 tablet 0 Taking   • tiotropium bromide monohydrate (SPIRIVA RESPIMAT) 2.5 MCG/ACT aerosol solution inhaler Inhale 1 puff Daily. 4 g 11 Taking   , Scheduled Meds:    piperacillin-tazobactam 3.375 g Intravenous Q8H   sodium chloride 10 mL Intravenous Q12H   , Continuous Infusions:    Pharmacy to Dose Zosyn     sodium chloride 100 mL/hr Last Rate: 100 mL/hr (05/13/20 0053)   , PRN Meds:  •  dextrose  •  dextrose  •  glucagon (human recombinant)  •  ipratropium-albuterol  •  Morphine **AND** naloxone  •  Pharmacy to Dose Zosyn  •  [COMPLETED] Insert peripheral IV **AND** sodium chloride  •  sodium chloride and Allergies:  Patient has no known allergies.    Objective      Vital Signs  Temp:  [97.5 °F (36.4 °C)-97.9 °F (36.6 °C)] 97.5 °F (36.4 °C)  Heart Rate:  [69-91] 74  Resp:  [16-20] 18  BP: ()/() 112/58    Physical Exam   Constitutional: She is oriented to person, place, and time. She appears well-developed.   HENT:   Head: Normocephalic and atraumatic.   Mouth/Throat: Mucous membranes are normal.   Eyes: Pupils are equal, round, and reactive to light. Conjunctivae are normal.   Neck: Neck supple. No JVD present. No tracheal deviation present. No thyromegaly present.   Cardiovascular: Normal rate and regular rhythm. Exam reveals no gallop and no friction rub.   No murmur heard.  Pulmonary/Chest: Effort normal and breath sounds normal.   Abdominal: Soft. She exhibits distension. There is no splenomegaly or hepatomegaly. No hernia.   Umbilical hernia that is reduced   Musculoskeletal: Normal range of motion. She exhibits no deformity.   Neurological: She is alert and oriented to person, place, and time.   Skin: Skin is warm and dry.   Psychiatric: She has a normal mood and affect.       Results Review:    I reviewed the patient's new clinical results.        Assessment/Plan       SBO (small bowel obstruction) (CMS/[facility])      Assessment:  (74-year-old female with recurrent incarcerated umbilical  hernia causing bowel obstruction.  The hernia has been reduced and she is decompressing with NG tube.  The patient will require repair this admission.).     Plan:   (OR tomorrow for robotic ventral hernia repair possible open, possible bowel resection.  Continue NG tube  IV fluids  The patient is aware of the risks and benefits of surgery including the need for possible bowel resection.  Her anticoagulation will be 48 hours from the last dose tomorrow at which time we will perform surgery unless her clinical picture changes prior to this date.).       I discussed the patients findings and my recommendations with patient    Petr Velásquez MD  05/13/20  09:51

## 2020-05-13 NOTE — PROGRESS NOTES
With the patient's permission discussed with daughter Ms Vides over the phone.  Discussed the case, daughter is now aware patient is going to get surgery tomorrow.  She verifies the patient was taking Eliquis regularly and this is being [facility] at [facility].  She was however only taking this daily.  This will need to be sorted out further but she confirms her last dose was yesterday. Entertained questions.

## 2020-05-13 NOTE — PROGRESS NOTES
Discharge Planning Assessment   [place]     Patient Name: Myara Hays  MRN: [id number]  Today's Date: 5/13/2020    Admit Date: 5/12/2020    Discharge Needs Assessment     Row Name 05/13/20 1541       Living Environment    Lives With  alone    Name(s) of Who Lives With Patient  Lives at home alone but plans to return home at discharge with son Vasu Cummings.     Current Living Arrangements  home/apartment/condo    Primary Care Provided by  self    Provides Primary Care For  no one, unable/limited ability to care for self    Family Caregiver if Needed  child(eligio), adult    Quality of Family Relationships  helpful;involved;supportive       Transition Planning    Patient/Family Anticipates Transition to  home with family    Transportation Anticipated  family or friend will provide       Discharge Needs Assessment    Concerns to be Addressed  discharge planning    Equipment Currently Used at Home  walker, rolling;wheelchair;nebulizer        Discharge Plan     Row Name 05/13/20 1546       Plan    Plan  Pt admitted on 5/12/20.  SS received consult per [facility] for discharge planning.  SS spoke with pt on this date.  Pt lives at home alone but states she will return home at discharge with son, Vasu Cummings.  Pt currently does not utilize home health services.  Pt currently utilizes rolling walker, wheelchair and nebulizer via unknown provider.  Pt's PCP is Niki Antony.  SS will follow and assist with discharge needs.             Demographic Summary     Row Name 05/13/20 154       General Information    Admission Type  inpatient    Referral Source  nursing    Reason for Consult  discharge planning    Preferred Language  English          Adelita Galvan, KASHW

## 2020-05-13 NOTE — PLAN OF CARE
Pt sleeping in between care.NG tube in place and on low suction. No reports of pain. In no distress will continue to monitor.

## 2020-05-14 ENCOUNTER — ANESTHESIA EVENT (OUTPATIENT)
Dept: PERIOP | Facility: HOSPITAL | Age: 75
End: 2020-05-14

## 2020-05-14 ENCOUNTER — ANESTHESIA (OUTPATIENT)
Dept: PERIOP | Facility: HOSPITAL | Age: 75
End: 2020-05-14

## 2020-05-14 LAB
ALBUMIN SERPL-MCNC: 3.16 G/DL (ref 3.5–5.2)
ALBUMIN/GLOB SERPL: 1.1 G/DL
ALP SERPL-CCNC: 91 U/L (ref 39–117)
ALT SERPL W P-5'-P-CCNC: 14 U/L (ref 1–33)
ANION GAP SERPL CALCULATED.3IONS-SCNC: 13.2 MMOL/L (ref 5–15)
APTT PPP: 30.2 SECONDS (ref 23.8–36.1)
AST SERPL-CCNC: 14 U/L (ref 1–32)
BASOPHILS # BLD AUTO: 0.06 10*3/MM3 (ref 0–0.2)
BASOPHILS NFR BLD AUTO: 0.9 % (ref 0–1.5)
BILIRUB SERPL-MCNC: 0.2 MG/DL (ref 0.2–1.2)
BUN BLD-MCNC: 11 MG/DL (ref 8–23)
BUN/CREAT SERPL: 13.9 (ref 7–25)
CALCIUM SPEC-SCNC: 8.3 MG/DL (ref 8.6–10.5)
CHLORIDE SERPL-SCNC: 107 MMOL/L (ref 98–107)
CO2 SERPL-SCNC: 17.8 MMOL/L (ref 22–29)
CREAT BLD-MCNC: 0.79 MG/DL (ref 0.57–1)
DEPRECATED RDW RBC AUTO: 43.8 FL (ref 37–54)
EOSINOPHIL # BLD AUTO: 0.39 10*3/MM3 (ref 0–0.4)
EOSINOPHIL NFR BLD AUTO: 6 % (ref 0.3–6.2)
ERYTHROCYTE [DISTWIDTH] IN BLOOD BY AUTOMATED COUNT: 13.2 % (ref 12.3–15.4)
GFR SERPL CREATININE-BSD FRML MDRD: 71 ML/MIN/1.73
GLOBULIN UR ELPH-MCNC: 2.9 GM/DL
GLUCOSE BLD-MCNC: 79 MG/DL (ref 65–99)
GLUCOSE BLDC GLUCOMTR-MCNC: 138 MG/DL (ref 70–130)
GLUCOSE BLDC GLUCOMTR-MCNC: 308 MG/DL (ref 70–130)
GLUCOSE BLDC GLUCOMTR-MCNC: 69 MG/DL (ref 70–130)
GLUCOSE BLDC GLUCOMTR-MCNC: 72 MG/DL (ref 70–130)
GLUCOSE BLDC GLUCOMTR-MCNC: 79 MG/DL (ref 70–130)
GLUCOSE BLDC GLUCOMTR-MCNC: 82 MG/DL (ref 70–130)
HCT VFR BLD AUTO: 38.1 % (ref 34–46.6)
HGB BLD-MCNC: 11.4 G/DL (ref 12–15.9)
IMM GRANULOCYTES # BLD AUTO: 0.06 10*3/MM3 (ref 0–0.05)
IMM GRANULOCYTES NFR BLD AUTO: 0.9 % (ref 0–0.5)
INR PPP: 0.99 (ref 0.9–1.1)
LYMPHOCYTES # BLD AUTO: 1.6 10*3/MM3 (ref 0.7–3.1)
LYMPHOCYTES NFR BLD AUTO: 24.7 % (ref 19.6–45.3)
MAGNESIUM SERPL-MCNC: 1.9 MG/DL (ref 1.6–2.4)
MCH RBC QN AUTO: 27.1 PG (ref 26.6–33)
MCHC RBC AUTO-ENTMCNC: 29.9 G/DL (ref 31.5–35.7)
MCV RBC AUTO: 90.7 FL (ref 79–97)
MONOCYTES # BLD AUTO: 0.57 10*3/MM3 (ref 0.1–0.9)
MONOCYTES NFR BLD AUTO: 8.8 % (ref 5–12)
NEUTROPHILS # BLD AUTO: 3.8 10*3/MM3 (ref 1.7–7)
NEUTROPHILS NFR BLD AUTO: 58.7 % (ref 42.7–76)
NRBC BLD AUTO-RTO: 0 /100 WBC (ref 0–0.2)
PHOSPHATE SERPL-MCNC: 2 MG/DL (ref 2.5–4.5)
PLATELET # BLD AUTO: 189 10*3/MM3 (ref 140–450)
PMV BLD AUTO: 11.5 FL (ref 6–12)
POTASSIUM BLD-SCNC: 3.7 MMOL/L (ref 3.5–5.2)
PROT SERPL-MCNC: 6.1 G/DL (ref 6–8.5)
PROTHROMBIN TIME: 13.6 SECONDS (ref 11–15.4)
RBC # BLD AUTO: 4.2 10*6/MM3 (ref 3.77–5.28)
SODIUM BLD-SCNC: 138 MMOL/L (ref 136–145)
WBC NRBC COR # BLD: 6.48 10*3/MM3 (ref 3.4–10.8)

## 2020-05-14 PROCEDURE — 25010000003 AMPICILLIN-SULBACTAM PER 1.5 G: Performed by: NURSE ANESTHETIST, CERTIFIED REGISTERED

## 2020-05-14 PROCEDURE — 25010000002 PROPOFOL 10 MG/ML EMULSION: Performed by: NURSE ANESTHETIST, CERTIFIED REGISTERED

## 2020-05-14 PROCEDURE — 8E0W4CZ ROBOTIC ASSISTED PROCEDURE OF TRUNK REGION, PERCUTANEOUS ENDOSCOPIC APPROACH: ICD-10-PCS | Performed by: SURGERY

## 2020-05-14 PROCEDURE — 25010000002 ROPIVACAINE PER 1 MG: Performed by: ANESTHESIOLOGY

## 2020-05-14 PROCEDURE — 25010000002 NEOSTIGMINE 10 MG/10ML SOLUTION: Performed by: NURSE ANESTHETIST, CERTIFIED REGISTERED

## 2020-05-14 PROCEDURE — 25010000002 FENTANYL CITRATE (PF) 100 MCG/2ML SOLUTION: Performed by: NURSE ANESTHETIST, CERTIFIED REGISTERED

## 2020-05-14 PROCEDURE — 25010000002 PIPERACILLIN SOD-TAZOBACTAM PER 1 G: Performed by: HOSPITALIST

## 2020-05-14 PROCEDURE — 0WUF4JZ SUPPLEMENT ABDOMINAL WALL WITH SYNTHETIC SUBSTITUTE, PERCUTANEOUS ENDOSCOPIC APPROACH: ICD-10-PCS | Performed by: SURGERY

## 2020-05-14 PROCEDURE — 84100 ASSAY OF PHOSPHORUS: CPT | Performed by: INTERNAL MEDICINE

## 2020-05-14 PROCEDURE — 49653 PR LAP, VENTRAL HERNIA REPAIR,INCARCERATED: CPT | Performed by: SURGERY

## 2020-05-14 PROCEDURE — 83735 ASSAY OF MAGNESIUM: CPT | Performed by: INTERNAL MEDICINE

## 2020-05-14 PROCEDURE — 25010000002 MORPHINE PER 10 MG: Performed by: HOSPITALIST

## 2020-05-14 PROCEDURE — 94799 UNLISTED PULMONARY SVC/PX: CPT

## 2020-05-14 PROCEDURE — C1781 MESH (IMPLANTABLE): HCPCS | Performed by: SURGERY

## 2020-05-14 PROCEDURE — 80053 COMPREHEN METABOLIC PANEL: CPT | Performed by: INTERNAL MEDICINE

## 2020-05-14 PROCEDURE — 85610 PROTHROMBIN TIME: CPT | Performed by: INTERNAL MEDICINE

## 2020-05-14 PROCEDURE — 25010000002 MORPHINE PER 10 MG: Performed by: SURGERY

## 2020-05-14 PROCEDURE — 25010000002 ONDANSETRON PER 1 MG: Performed by: NURSE ANESTHETIST, CERTIFIED REGISTERED

## 2020-05-14 PROCEDURE — 25010000002 PIPERACILLIN SOD-TAZOBACTAM PER 1 G: Performed by: SURGERY

## 2020-05-14 PROCEDURE — 94640 AIRWAY INHALATION TREATMENT: CPT

## 2020-05-14 PROCEDURE — 85730 THROMBOPLASTIN TIME PARTIAL: CPT | Performed by: INTERNAL MEDICINE

## 2020-05-14 PROCEDURE — 85025 COMPLETE CBC W/AUTO DIFF WBC: CPT | Performed by: INTERNAL MEDICINE

## 2020-05-14 PROCEDURE — 82962 GLUCOSE BLOOD TEST: CPT

## 2020-05-14 PROCEDURE — 99232 SBSQ HOSP IP/OBS MODERATE 35: CPT | Performed by: INTERNAL MEDICINE

## 2020-05-14 DEVICE — VENTRALIGHT ST MESH WITH ECHO 2 POSITIONING SYSTEM 11 CM (4.5)" CIRCLE
Type: IMPLANTABLE DEVICE | Site: ABDOMEN | Status: FUNCTIONAL
Brand: VENTRALIGHT ST MESH WITH ECHO 2 POSITIONING SYSTEM

## 2020-05-14 RX ORDER — SODIUM CHLORIDE 0.9 % (FLUSH) 0.9 %
10 SYRINGE (ML) INJECTION AS NEEDED
Status: DISCONTINUED | OUTPATIENT
Start: 2020-05-14 | End: 2020-05-14 | Stop reason: HOSPADM

## 2020-05-14 RX ORDER — IPRATROPIUM BROMIDE AND ALBUTEROL SULFATE 2.5; .5 MG/3ML; MG/3ML
3 SOLUTION RESPIRATORY (INHALATION) ONCE AS NEEDED
Status: DISCONTINUED | OUTPATIENT
Start: 2020-05-14 | End: 2020-05-14 | Stop reason: HOSPADM

## 2020-05-14 RX ORDER — SODIUM CHLORIDE 0.9 % (FLUSH) 0.9 %
3 SYRINGE (ML) INJECTION EVERY 12 HOURS SCHEDULED
Status: DISCONTINUED | OUTPATIENT
Start: 2020-05-14 | End: 2020-05-14 | Stop reason: HOSPADM

## 2020-05-14 RX ORDER — ROPIVACAINE HYDROCHLORIDE 5 MG/ML
INJECTION, SOLUTION EPIDURAL; INFILTRATION; PERINEURAL
Status: COMPLETED | OUTPATIENT
Start: 2020-05-14 | End: 2020-05-14

## 2020-05-14 RX ORDER — GLYCOPYRROLATE 0.2 MG/ML
INJECTION INTRAMUSCULAR; INTRAVENOUS AS NEEDED
Status: DISCONTINUED | OUTPATIENT
Start: 2020-05-14 | End: 2020-05-14 | Stop reason: SURG

## 2020-05-14 RX ORDER — ONDANSETRON 2 MG/ML
4 INJECTION INTRAMUSCULAR; INTRAVENOUS EVERY 6 HOURS PRN
Status: DISCONTINUED | OUTPATIENT
Start: 2020-05-14 | End: 2020-05-19 | Stop reason: HOSPADM

## 2020-05-14 RX ORDER — OXYCODONE HYDROCHLORIDE AND ACETAMINOPHEN 5; 325 MG/1; MG/1
1 TABLET ORAL ONCE AS NEEDED
Status: DISCONTINUED | OUTPATIENT
Start: 2020-05-14 | End: 2020-05-14 | Stop reason: HOSPADM

## 2020-05-14 RX ORDER — ONDANSETRON 2 MG/ML
INJECTION INTRAMUSCULAR; INTRAVENOUS AS NEEDED
Status: DISCONTINUED | OUTPATIENT
Start: 2020-05-14 | End: 2020-05-14 | Stop reason: SURG

## 2020-05-14 RX ORDER — PROPOFOL 10 MG/ML
VIAL (ML) INTRAVENOUS AS NEEDED
Status: DISCONTINUED | OUTPATIENT
Start: 2020-05-14 | End: 2020-05-14 | Stop reason: SURG

## 2020-05-14 RX ORDER — NEOSTIGMINE METHYLSULFATE 1 MG/ML
INJECTION, SOLUTION INTRAVENOUS AS NEEDED
Status: DISCONTINUED | OUTPATIENT
Start: 2020-05-14 | End: 2020-05-14 | Stop reason: SURG

## 2020-05-14 RX ORDER — FENTANYL CITRATE 50 UG/ML
INJECTION, SOLUTION INTRAMUSCULAR; INTRAVENOUS AS NEEDED
Status: DISCONTINUED | OUTPATIENT
Start: 2020-05-14 | End: 2020-05-14 | Stop reason: SURG

## 2020-05-14 RX ORDER — ROCURONIUM BROMIDE 10 MG/ML
INJECTION, SOLUTION INTRAVENOUS AS NEEDED
Status: DISCONTINUED | OUTPATIENT
Start: 2020-05-14 | End: 2020-05-14 | Stop reason: SURG

## 2020-05-14 RX ORDER — MEPERIDINE HYDROCHLORIDE 25 MG/ML
12.5 INJECTION INTRAMUSCULAR; INTRAVENOUS; SUBCUTANEOUS
Status: DISCONTINUED | OUTPATIENT
Start: 2020-05-14 | End: 2020-05-14 | Stop reason: HOSPADM

## 2020-05-14 RX ORDER — MAGNESIUM HYDROXIDE 1200 MG/15ML
LIQUID ORAL AS NEEDED
Status: DISCONTINUED | OUTPATIENT
Start: 2020-05-14 | End: 2020-05-14 | Stop reason: HOSPADM

## 2020-05-14 RX ORDER — FAMOTIDINE 10 MG/ML
INJECTION, SOLUTION INTRAVENOUS AS NEEDED
Status: DISCONTINUED | OUTPATIENT
Start: 2020-05-14 | End: 2020-05-14 | Stop reason: SURG

## 2020-05-14 RX ORDER — ONDANSETRON 2 MG/ML
4 INJECTION INTRAMUSCULAR; INTRAVENOUS AS NEEDED
Status: DISCONTINUED | OUTPATIENT
Start: 2020-05-14 | End: 2020-05-14 | Stop reason: HOSPADM

## 2020-05-14 RX ORDER — FENTANYL CITRATE 50 UG/ML
50 INJECTION, SOLUTION INTRAMUSCULAR; INTRAVENOUS
Status: DISCONTINUED | OUTPATIENT
Start: 2020-05-14 | End: 2020-05-14 | Stop reason: HOSPADM

## 2020-05-14 RX ORDER — MORPHINE SULFATE 2 MG/ML
2 INJECTION, SOLUTION INTRAMUSCULAR; INTRAVENOUS EVERY 4 HOURS PRN
Status: DISCONTINUED | OUTPATIENT
Start: 2020-05-14 | End: 2020-05-19 | Stop reason: HOSPADM

## 2020-05-14 RX ORDER — ACETAMINOPHEN 325 MG/1
650 TABLET ORAL ONCE
Status: DISCONTINUED | OUTPATIENT
Start: 2020-05-14 | End: 2020-05-14 | Stop reason: HOSPADM

## 2020-05-14 RX ORDER — SODIUM CHLORIDE 0.9 % (FLUSH) 0.9 %
10 SYRINGE (ML) INJECTION EVERY 12 HOURS SCHEDULED
Status: DISCONTINUED | OUTPATIENT
Start: 2020-05-14 | End: 2020-05-14 | Stop reason: HOSPADM

## 2020-05-14 RX ORDER — SODIUM CHLORIDE, SODIUM LACTATE, POTASSIUM CHLORIDE, CALCIUM CHLORIDE 600; 310; 30; 20 MG/100ML; MG/100ML; MG/100ML; MG/100ML
125 INJECTION, SOLUTION INTRAVENOUS CONTINUOUS
Status: DISCONTINUED | OUTPATIENT
Start: 2020-05-14 | End: 2020-05-14

## 2020-05-14 RX ORDER — CELECOXIB 200 MG/1
200 CAPSULE ORAL ONCE
Status: DISCONTINUED | OUTPATIENT
Start: 2020-05-14 | End: 2020-05-14 | Stop reason: HOSPADM

## 2020-05-14 RX ORDER — DEXTROSE AND SODIUM CHLORIDE 5; .9 G/100ML; G/100ML
100 INJECTION, SOLUTION INTRAVENOUS CONTINUOUS
Status: DISCONTINUED | OUTPATIENT
Start: 2020-05-14 | End: 2020-05-15

## 2020-05-14 RX ADMIN — METOPROLOL TARTRATE 2.5 MG: 1 INJECTION, SOLUTION INTRAVENOUS at 00:48

## 2020-05-14 RX ADMIN — PIPERACILLIN SODIUM AND TAZOBACTAM SODIUM 3.38 G: 3; .375 INJECTION, POWDER, LYOPHILIZED, FOR SOLUTION INTRAVENOUS at 14:10

## 2020-05-14 RX ADMIN — NEOSTIGMINE METHYLSULFATE 2 MG: 1 INJECTION, SOLUTION INTRAVENOUS at 15:55

## 2020-05-14 RX ADMIN — METOPROLOL TARTRATE 2.5 MG: 1 INJECTION, SOLUTION INTRAVENOUS at 05:34

## 2020-05-14 RX ADMIN — ONDANSETRON 4 MG: 2 INJECTION INTRAMUSCULAR; INTRAVENOUS at 13:39

## 2020-05-14 RX ADMIN — FENTANYL CITRATE 100 MCG: 50 INJECTION INTRAMUSCULAR; INTRAVENOUS at 13:39

## 2020-05-14 RX ADMIN — DEXTROSE AND SODIUM CHLORIDE: 5; 900 INJECTION, SOLUTION INTRAVENOUS at 15:45

## 2020-05-14 RX ADMIN — MORPHINE SULFATE 2 MG: 2 INJECTION, SOLUTION INTRAMUSCULAR; INTRAVENOUS at 20:06

## 2020-05-14 RX ADMIN — PANTOPRAZOLE SODIUM 40 MG: 40 INJECTION, POWDER, FOR SOLUTION INTRAVENOUS at 05:34

## 2020-05-14 RX ADMIN — MORPHINE SULFATE 1 MG: 2 INJECTION, SOLUTION INTRAMUSCULAR; INTRAVENOUS at 11:29

## 2020-05-14 RX ADMIN — ROCURONIUM BROMIDE 40 MG: 10 SOLUTION INTRAVENOUS at 13:45

## 2020-05-14 RX ADMIN — FAMOTIDINE 20 MG: 10 INJECTION INTRAVENOUS at 13:39

## 2020-05-14 RX ADMIN — SODIUM CHLORIDE, PRESERVATIVE FREE 10 ML: 5 INJECTION INTRAVENOUS at 08:19

## 2020-05-14 RX ADMIN — BUDESONIDE AND FORMOTEROL FUMARATE DIHYDRATE 2 PUFF: 80; 4.5 AEROSOL RESPIRATORY (INHALATION) at 18:30

## 2020-05-14 RX ADMIN — GLYCOPYRROLATE 0.4 MG: 0.2 INJECTION INTRAMUSCULAR; INTRAVENOUS at 15:55

## 2020-05-14 RX ADMIN — DEXTROSE AND SODIUM CHLORIDE 100 ML/HR: 5; 900 INJECTION, SOLUTION INTRAVENOUS at 13:14

## 2020-05-14 RX ADMIN — PIPERACILLIN SODIUM AND TAZOBACTAM SODIUM 3.38 G: 3; .375 INJECTION, POWDER, LYOPHILIZED, FOR SOLUTION INTRAVENOUS at 05:39

## 2020-05-14 RX ADMIN — BUDESONIDE AND FORMOTEROL FUMARATE DIHYDRATE 2 PUFF: 80; 4.5 AEROSOL RESPIRATORY (INHALATION) at 06:42

## 2020-05-14 RX ADMIN — FENTANYL CITRATE 100 MCG: 50 INJECTION INTRAMUSCULAR; INTRAVENOUS at 16:04

## 2020-05-14 RX ADMIN — AMPICILLIN SODIUM AND SULBACTAM SODIUM 3 G: 2; 1 INJECTION, POWDER, FOR SOLUTION INTRAMUSCULAR; INTRAVENOUS at 13:40

## 2020-05-14 RX ADMIN — DEXTROSE AND SODIUM CHLORIDE: 5; 900 INJECTION, SOLUTION INTRAVENOUS at 13:40

## 2020-05-14 RX ADMIN — SODIUM PHOSPHATE, MONOBASIC, MONOHYDRATE 10 MMOL: 276; 142 INJECTION, SOLUTION INTRAVENOUS at 11:29

## 2020-05-14 RX ADMIN — SODIUM CHLORIDE 100 ML/HR: 9 INJECTION, SOLUTION INTRAVENOUS at 02:50

## 2020-05-14 RX ADMIN — ROPIVACAINE HYDROCHLORIDE 200 MG: 5 INJECTION, SOLUTION EPIDURAL; INFILTRATION; PERINEURAL at 13:57

## 2020-05-14 RX ADMIN — PIPERACILLIN SODIUM AND TAZOBACTAM SODIUM 3.38 G: 3; .375 INJECTION, POWDER, LYOPHILIZED, FOR SOLUTION INTRAVENOUS at 20:06

## 2020-05-14 RX ADMIN — SODIUM CHLORIDE, PRESERVATIVE FREE 10 ML: 5 INJECTION INTRAVENOUS at 20:06

## 2020-05-14 RX ADMIN — PROPOFOL 60 MG: 10 INJECTION, EMULSION INTRAVENOUS at 13:45

## 2020-05-14 NOTE — PLAN OF CARE
Pt resting with no complaints. NG tube to low suction. In no acute distress. Will continue to monitor and follow plan of care.

## 2020-05-14 NOTE — ANESTHESIA POSTPROCEDURE EVALUATION
Patient: Mayra Hays    Procedure Summary     Date:  05/14/20 Room / Location:   [facility] OR 04 /  [facility]    Anesthesia Start:  1340 Anesthesia Stop:  1617    Procedure:  VENTRAL HERNIA REPAIR LAPAROSCOPIC WITH DAVINCI ROBOT (N/A Abdomen) Diagnosis:       SBO (small bowel obstruction) (CMS/HCC)      (SBO (small bowel obstruction) (CMS/HCC) [K56.609])    Surgeon:  Petr Velásquez MD Provider:  Edgar Gomez DO    Anesthesia Type:  general ASA Status:  3          Anesthesia Type: general    Vitals  Vitals Value Taken Time   /68 5/14/2020  4:57 PM   Temp 98 °F (36.7 °C) 5/14/2020  4:17 PM   Pulse 66 5/14/2020  4:57 PM   Resp 18 5/14/2020  4:57 PM   SpO2 100 % 5/14/2020  4:57 PM           Post Anesthesia Care and Evaluation    Patient location during evaluation: PHASE II  Patient participation: complete - patient participated  Level of consciousness: awake and alert  Pain score: 1  Pain management: adequate  Airway patency: patent  Anesthetic complications: No anesthetic complications  PONV Status: controlled  Cardiovascular status: acceptable  Respiratory status: acceptable  Hydration status: acceptable

## 2020-05-14 NOTE — DISCHARGE INSTRUCTIONS
You have been enrolled into the transition from hospital to home program.  A nurse will be contacting you after you discharge to home to set up a time to come out to your home for a follow-up visit.  If you have any questions or concerns you can call this number anytime and a nurse will contact you:  [facility]  [phone].

## 2020-05-14 NOTE — PROGRESS NOTES
CC: SBO    NG in place.   Still without flatus.  Hernia remains reduced.    AFVSS  RRR  S/mild tenderness/hernia soft, abdomen distended    75 yo F with SBO likely secondary to umbilical incisional hernia that has been reduced.  -awaiting bowel function  -OR today for robotic, possible open hernia repair  -continue NG tube  -Continue to hold anticoagulation

## 2020-05-14 NOTE — PROGRESS NOTES
Assisted By: Sincere RAMIREZ    CC: F/U on SBO    Interview History/HPI: Patient states that she is doing okay she is little nervous about surgery, she states she had some flatus, abdominal pain has improved, no emesis overnight but she still has her NG tube in place, no chest pain or shortness of breath.      Vitals:    05/14/20 1010   BP: 126/62   Pulse: 50   Resp: 18   Temp: 98.7 °F (37.1 °C)   SpO2: 95%         Intake/Output Summary (Last 24 hours) at 5/14/2020 1021  Last data filed at 5/14/2020 0534  Gross per 24 hour   Intake 2074.6 ml   Output 1900 ml   Net 174.6 ml       EXAM: Lungs have bilateral breath sounds are clear no rhonchi rales wheezing, no retractions, no distress, mood is good skin warm and dry she can speak in full sentences without difficulty NG tube in place and has a greenish drainage within the tubing itself heart regular rate and rhythm without murmur rub or gallop, no edema, strength is symmetric abdomen is not distended bowel sounds occasional nontender today however.      EKG: Sinus Rhyrhm    Tele: Sinus rhythm    LABS:   Lab Results (last 48 hours)     Procedure Component Value Units Date/Time    POC Glucose Once [[id number]]  (Normal) Collected:  05/14/20 0611    Specimen:  Blood Updated:  05/14/20 0635     Glucose 79 mg/dL     Comprehensive Metabolic Panel [[id number]]  (Abnormal) Collected:  05/14/20 0445    Specimen:  Blood Updated:  05/14/20 0530     Glucose 79 mg/dL      BUN 11 mg/dL      Creatinine 0.79 mg/dL      Sodium 138 mmol/L      Potassium 3.7 mmol/L      Chloride 107 mmol/L      CO2 17.8 mmol/L      Calcium 8.3 mg/dL      Total Protein 6.1 g/dL      Albumin 3.16 g/dL      ALT (SGPT) 14 U/L      AST (SGOT) 14 U/L      Alkaline Phosphatase 91 U/L      Total Bilirubin 0.2 mg/dL      eGFR Non African Amer 71 mL/min/1.73      Globulin 2.9 gm/dL      A/G Ratio 1.1 g/dL      BUN/Creatinine Ratio 13.9     Anion Gap 13.2 mmol/L     Narrative:       GFR Normal >60  Chronic Kidney Disease  <60  Kidney Failure <15      Phosphorus [[id number]]  (Abnormal) Collected:  05/14/20 0445    Specimen:  Blood Updated:  05/14/20 0530     Phosphorus 2.0 mg/dL     Magnesium [[id number]]  (Normal) Collected:  05/14/20 0445    Specimen:  Blood Updated:  05/14/20 0530     Magnesium 1.9 mg/dL     aPTT [[id number]]  (Normal) Collected:  05/14/20 0445    Specimen:  Blood Updated:  05/14/20 0517     PTT 30.2 seconds     Narrative:       PTT Heparin Therapeutic Range:  59 - 95 seconds      Protime-INR [[id number]]  (Normal) Collected:  05/14/20 0445    Specimen:  Blood Updated:  05/14/20 0517     Protime 13.6 Seconds      INR 0.99    Narrative:       Suggested INR therapeutic range for stable oral anticoagulant therapy:    Low Intensity therapy:   1.5-2.0  Moderate Intensity therapy:   2.0-3.0  High Intensity therapy:   2.5-4.0    CBC & Differential [[id number]] Collected:  05/14/20 0445    Specimen:  Blood Updated:  05/14/20 0508    Narrative:       The following orders were created for panel order CBC & Differential.  Procedure                               Abnormality         Status                     ---------                               -----------         ------                     CBC Auto Differential[[id number]]        Abnormal            Final result                 Please view results for these tests on the individual orders.    CBC Auto Differential [[id number]]  (Abnormal) Collected:  05/14/20 0445    Specimen:  Blood Updated:  05/14/20 0508     WBC 6.48 10*3/mm3      RBC 4.20 10*6/mm3      Hemoglobin 11.4 g/dL      Hematocrit 38.1 %      MCV 90.7 fL      MCH 27.1 pg      MCHC 29.9 g/dL      RDW 13.2 %      RDW-SD 43.8 fl      MPV 11.5 fL      Platelets 189 10*3/mm3      Neutrophil % 58.7 %      Lymphocyte % 24.7 %      Monocyte % 8.8 %      Eosinophil % 6.0 %      Basophil % 0.9 %      Immature Grans % 0.9 %      Neutrophils, Absolute 3.80 10*3/mm3      Lymphocytes, Absolute 1.60 10*3/mm3      Monocytes, Absolute  0.57 10*3/mm3      Eosinophils, Absolute 0.39 10*3/mm3      Basophils, Absolute 0.06 10*3/mm3      Immature Grans, Absolute 0.06 10*3/mm3      nRBC 0.0 /100 WBC     POC Glucose Once [[id number]]  (Normal) Collected:  05/14/20 0050    Specimen:  Blood Updated:  05/14/20 0057     Glucose 82 mg/dL     Blood Culture - Blood, Arm, Left [[id number]] Collected:  05/12/20 2330    Specimen:  Blood from Arm, Left Updated:  05/13/20 2345     Blood Culture No growth at 24 hours    Blood Culture - Blood, Arm, Right [[id number]] Collected:  05/12/20 2317    Specimen:  Blood from Arm, Right Updated:  05/13/20 2330     Blood Culture No growth at 24 hours    POC Glucose Once [[id number]]  (Normal) Collected:  05/13/20 1915    Specimen:  Blood Updated:  05/13/20 1922     Glucose 80 mg/dL     POC Glucose Once [[id number]]  (Normal) Collected:  05/13/20 1627    Specimen:  Blood Updated:  05/13/20 1633     Glucose 87 mg/dL     Urine Culture - Urine, Urine, Clean Catch [[id number]] Collected:  05/12/20 2044    Specimen:  Urine, Clean Catch Updated:  05/13/20 1245     Urine Culture 25,000 CFU/mL Mixed Casey Isolated    Narrative:       Specimen contains mixed organisms of questionable pathogenicity which indicates contamination with commensal casey.  Further identification is unlikely to provide clinically useful information.  Suggest recollection.    POC Glucose Once [[id number]]  (Normal) Collected:  05/13/20 1000    Specimen:  Blood Updated:  05/13/20 1038     Glucose 94 mg/dL     POC Glucose Once [[id number]]  (Normal) Collected:  05/13/20 0641    Specimen:  Blood Updated:  05/13/20 0656     Glucose 101 mg/dL     Comprehensive Metabolic Panel [[id number]]  (Abnormal) Collected:  05/13/20 0422    Specimen:  Blood Updated:  05/13/20 0502     Glucose 116 mg/dL      BUN 15 mg/dL      Creatinine 0.85 mg/dL      Sodium 137 mmol/L      Potassium 3.9 mmol/L      Chloride 106 mmol/L      CO2 21.0 mmol/L      Calcium 8.2 mg/dL      Total Protein 6.4  g/dL      Albumin 3.42 g/dL      ALT (SGPT) 18 U/L      AST (SGOT) 24 U/L      Alkaline Phosphatase 103 U/L      Total Bilirubin 0.3 mg/dL      eGFR Non African Amer 66 mL/min/1.73      Globulin 3.0 gm/dL      A/G Ratio 1.1 g/dL      BUN/Creatinine Ratio 17.6     Anion Gap 10.0 mmol/L     Narrative:       GFR Normal >60  Chronic Kidney Disease <60  Kidney Failure <15      CBC Auto Differential [[id number]]  (Abnormal) Collected:  05/13/20 0422    Specimen:  Blood Updated:  05/13/20 0437     WBC 7.66 10*3/mm3      RBC 4.50 10*6/mm3      Hemoglobin 12.2 g/dL      Hematocrit 39.6 %      MCV 88.0 fL      MCH 27.1 pg      MCHC 30.8 g/dL      RDW 13.1 %      RDW-SD 42.3 fl      MPV 11.5 fL      Platelets 193 10*3/mm3      Neutrophil % 65.6 %      Lymphocyte % 18.5 %      Monocyte % 11.5 %      Eosinophil % 3.5 %      Basophil % 0.4 %      Immature Grans % 0.5 %      Neutrophils, Absolute 5.02 10*3/mm3      Lymphocytes, Absolute 1.42 10*3/mm3      Monocytes, Absolute 0.88 10*3/mm3      Eosinophils, Absolute 0.27 10*3/mm3      Basophils, Absolute 0.03 10*3/mm3      Immature Grans, Absolute 0.04 10*3/mm3      nRBC 0.0 /100 WBC     POC Glucose Once [[id number]]  (Normal) Collected:  05/13/20 0054    Specimen:  Blood Updated:  05/13/20 0100     Glucose 99 mg/dL     [facility] In-House SARS-CoV-2, NAAT, NP Swab (NO TRANSPORT MEDIA) - Swab, Nasopharynx [[id number]]  (Normal) Collected:  05/12/20 2346    Specimen:  Swab from Nasopharynx Updated:  05/13/20 0013     COVID19 Not Detected    Hemoglobin A1c [[id number]]  (Abnormal) Collected:  05/12/20 2001    Specimen:  Blood from Arm, Left Updated:  05/13/20 0009     Hemoglobin A1C 6.60 %     Narrative:       Hemoglobin A1C Ranges:    Increased Risk for Diabetes  5.7% to 6.4%  Diabetes                     >= 6.5%  Diabetic Goal                < 7.0%    POC Glucose Once [[id number]]  (Normal) Collected:  05/12/20 2358    Specimen:  Blood Updated:  05/13/20 0005     Glucose 96 mg/dL     Lactic  Acid, Plasma [[id number]]  (Normal) Collected:  05/12/20 2317    Specimen:  Blood from Arm, Right Updated:  05/12/20 2337     Lactate 0.7 mmol/L     Troponin [[id number]]  (Normal) Collected:  05/12/20 2001    Specimen:  Blood from Arm, Left Updated:  05/12/20 2309     Troponin T <0.010 ng/mL     Narrative:       Troponin T Reference Range:  <= 0.03 ng/mL-   Negative for AMI  >0.03 ng/mL-     Abnormal for myocardial necrosis.  Clinicians would have to utilize clinical acumen, EKG, Troponin and serial changes to determine if it is an Acute Myocardial Infarction or myocardial injury due to an underlying chronic condition.       Results may be falsely decreased if patient taking Biotin.      [place] Draw [[id number]] Collected:  05/12/20 2001    Specimen:  Blood from Arm, Left Updated:  05/12/20 2115    Narrative:       The following orders were created for panel order [place] Draw.  Procedure                               Abnormality         Status                     ---------                               -----------         ------                     Light Blue Top[[id number]]                                   Final result               Green Top (Gel)[[id number]]                                  Final result               Lavender Top[[id number]]                                     Final result               Gold Top - SST[[id number]]                                   Final result                 Please view results for these tests on the individual orders.    Light Blue Top [[id number]] Collected:  05/12/20 2001    Specimen:  Blood from Arm, Left Updated:  05/12/20 2115     Extra Tube hold for add-on     Comment: Auto resulted       Green Top (Gel) [[id number]] Collected:  05/12/20 2001    Specimen:  Blood from Arm, Left Updated:  05/12/20 2115     Extra Tube Hold for add-ons.     Comment: Auto resulted.       Lavender Top [[id number]] Collected:  05/12/20 2001    Specimen:  Blood from Arm, Left Updated:  05/12/20 2115      Extra Tube hold for add-on     Comment: Auto resulted       Gold Top - SST [[id number]] Collected:  05/12/20 2001    Specimen:  Blood from Arm, Left Updated:  05/12/20 2115     Extra Tube Hold for add-ons.     Comment: Auto resulted.       Urinalysis With Culture If Indicated - Urine, Clean Catch [[id number]]  (Abnormal) Collected:  05/12/20 2044    Specimen:  Urine, Clean Catch Updated:  05/12/20 2051     Color, UA Yellow     Appearance, UA Cloudy     pH, UA <=5.0     Specific Gravity, UA 1.028     Glucose, UA Negative     Ketones, UA Trace     Bilirubin, UA Negative     Blood, UA Negative     Protein, UA Negative     Leuk Esterase, UA Trace     Nitrite, UA Negative     Urobilinogen, UA 0.2 E.U./dL    Urinalysis, Microscopic Only - Urine, Clean Catch [[id number]]  (Abnormal) Collected:  05/12/20 2044    Specimen:  Urine, Clean Catch Updated:  05/12/20 2051     RBC, UA 0-2 /HPF      WBC, UA 13-20 /HPF      Bacteria, UA 1+ /HPF      Squamous Epithelial Cells, UA 13-20 /HPF      Hyaline Casts, UA 3-6 /LPF      Methodology Automated Microscopy    Comprehensive Metabolic Panel [[id number]]  (Abnormal) Collected:  05/12/20 2001    Specimen:  Blood from Arm, Left Updated:  05/12/20 2026     Glucose 111 mg/dL      BUN 17 mg/dL      Creatinine 0.98 mg/dL      Sodium 137 mmol/L      Potassium 4.2 mmol/L      Chloride 101 mmol/L      CO2 23.2 mmol/L      Calcium 9.1 mg/dL      Total Protein 7.2 g/dL      Albumin 4.08 g/dL      ALT (SGPT) 15 U/L      AST (SGOT) 15 U/L      Alkaline Phosphatase 111 U/L      Total Bilirubin 0.3 mg/dL      eGFR Non African Amer 56 mL/min/1.73      Globulin 3.1 gm/dL      A/G Ratio 1.3 g/dL      BUN/Creatinine Ratio 17.3     Anion Gap 12.8 mmol/L     Narrative:       GFR Normal >60  Chronic Kidney Disease <60  Kidney Failure <15      Lipase [[id number]]  (Normal) Collected:  05/12/20 2001    Specimen:  Blood from Arm, Left Updated:  05/12/20 2026     Lipase 15 U/L     C-reactive Protein [[id number]]   (Normal) Collected:  05/12/20 2001    Specimen:  Blood from Arm, Left Updated:  05/12/20 2026     C-Reactive Protein 0.27 mg/dL     Magnesium [[id number]]  (Normal) Collected:  05/12/20 2001    Specimen:  Blood from Arm, Left Updated:  05/12/20 2026     Magnesium 1.8 mg/dL     CBC & Differential [[id number]] Collected:  05/12/20 2001    Specimen:  Blood from Arm, Left Updated:  05/12/20 2006    Narrative:       The following orders were created for panel order CBC & Differential.  Procedure                               Abnormality         Status                     ---------                               -----------         ------                     CBC Auto Differential[[id number]]        Abnormal            Final result                 Please view results for these tests on the individual orders.    CBC Auto Differential [[id number]]  (Abnormal) Collected:  05/12/20 2001    Specimen:  Blood from Arm, Left Updated:  05/12/20 2006     WBC 6.77 10*3/mm3      RBC 4.84 10*6/mm3      Hemoglobin 13.2 g/dL      Hematocrit 41.6 %      MCV 86.0 fL      MCH 27.3 pg      MCHC 31.7 g/dL      RDW 13.1 %      RDW-SD 40.7 fl      MPV 11.5 fL      Platelets 217 10*3/mm3      Neutrophil % 51.3 %      Lymphocyte % 30.7 %      Monocyte % 12.1 %      Eosinophil % 4.9 %      Basophil % 0.6 %      Immature Grans % 0.4 %      Neutrophils, Absolute 3.47 10*3/mm3      Lymphocytes, Absolute 2.08 10*3/mm3      Monocytes, Absolute 0.82 10*3/mm3      Eosinophils, Absolute 0.33 10*3/mm3      Basophils, Absolute 0.04 10*3/mm3      Immature Grans, Absolute 0.03 10*3/mm3      nRBC 0.0 /100 WBC           Radiology:    Imaging Results (Last 72 Hours)     Procedure Component Value Units Date/Time    XR Chest 1 View [[id number]] Collected:  05/13/20 0043     Updated:  05/13/20 0045    Narrative:       CR Chest 1 Vw    INDICATION:   Evaluate nasogastric tube placement     COMPARISON:    None available.    FINDINGS:  Single portable AP view(s) of the  chest.    This image is centered over the diaphragms. The nasogastric is tip in the body the stomach. The visible lungs are clear    Signer Name: El Goldberg MD   Signed: 5/13/2020 12:43 AM   Workstation Name: [employer]    Radiology [facility]    CT Abdomen Pelvis Without Contrast [[id number]] Collected:  05/12/20 2234     Updated:  05/12/20 2236    Narrative:       CT Abdomen Pelvis WO    INDICATION:   History of hernia with left lower quadrant pain tonight    TECHNIQUE:   CT of the abdomen and pelvis without IV contrast. Coronal and sagittal reconstructions were obtained.  Radiation dose reduction techniques included automated exposure control or exposure modulation based on body size. Count of known CT and cardiac nuc  med studies performed in previous 12 months: 1.     COMPARISON:   None available    FINDINGS:  Abdomen: The lung bases are clear. The gallbladder is been removed. Liver, spleen, pancreas, adrenal glands and kidneys are normal. Colon appears normal. The jejunum shows abnormal distention proximally. It measures up to 5.7 cm transverse dimension.  There is an abdominal wall hernia to the right of the umbilicus and this has small bowel within it. This appears be causing partial obstruction. The bowel proximal to the hernia is distended in the bowel distal to the hernia is collapsed.    Pelvis: Uterus and adnexal regions and bladder are normal. Bones are unremarkable.      Impression:       There appears to be a high-grade small bowel obstruction caused by a small hernia through the abdominal wall to the right of the umbilicus. The opening through the abdominal wall is about 2 cm in diameter. The jejunum proximal to this hernia is dilated  and the small bowel distal to this hernia is collapsed.    Otherwise negative study          Signer Name: El Goldberg MD   Signed: 5/12/2020 10:34 PM   Workstation Name: [employer]    Radiology [facility]          Results for orders  placed during the hospital encounter of 01/25/19   Adult Transthoracic Echo Complete W/ Cont if Necessary Per Protocol    Narrative · A trivial pericardial effusion noted. Adjacent to the right ventricle.  · Right ventricular cavity is borderline dilated.  · Estimated EF = 60%.  · Left ventricular systolic function is normal.  · No prev echo          Assessment/Plan:   Small bowel obstruction secondary to hernia, for OR today.  I discussed with  and requested that he call the patient's daughter postoperatively, this is okay with the patient.  In our records the daughter is listed as POA.  I discussed with the daughter Hailey this morning, updated her, entertained any questions which she did not have any this morning, I informed her that the patient would be going to the operating room later this afternoon and have asked the nurse Sincere to call her when the patient is transported down to the operating room.    History of paroxysmal atrial fibrillation, maintaining sinus rhythm, depending on laparoscopic or open would be when her anticoagulation could be restarted.  We will leave this to surgery discretion, she is on SCUDs for DVT prophylaxis    Hypophosphatemia, phosphorus replacement ordered    Hypertension, controlled, she is on IV Lopressor.    Reported COPD, no exacerbation currently.    Reported history of diabetes and A1c 6.6 but glucoses are normal now.

## 2020-05-14 NOTE — ANESTHESIA PROCEDURE NOTES
Airway  Urgency: elective    Date/Time: 5/14/2020 1:47 PM  Airway not difficult    General Information and Staff    Patient location during procedure: OR  Anesthesiologist: Edgar Gomez DO  CRNA: Jocelin Castellon CRNA    Indications and Patient Condition  Indications for airway management: airway protection    Preoxygenated: yes  MILS maintained throughout      Final Airway Details  Final airway type: endotracheal airway      Successful airway: ETT  Cuffed: yes   Successful intubation technique: direct laryngoscopy and RSI  Endotracheal tube insertion site: oral  Blade: Wilks  Blade size: 2  ETT size (mm): 7.5  Cormack-Lehane Classification: grade I - full view of glottis  Placement verified by: chest auscultation   Number of attempts at approach: 1  Assessment: lips, teeth, and gum same as pre-op and atraumatic intubation

## 2020-05-14 NOTE — ANESTHESIA PROCEDURE NOTES
"Peripheral Block      Patient location during procedure: OR  Reason for block: at surgeon's request and post-op pain management  Performed by  Anesthesiologist: Edgar Gomez DO  CRNA: Jocelin Castellon CRNA  Preanesthetic Checklist  Completed: patient identified, site marked, surgical consent, pre-op evaluation, timeout performed, IV checked, risks and benefits discussed and monitors and equipment checked  Prep:  Pt Position: supine  Sterile barriers:cap, gloves, sterile barriers and mask  Prep: ChloraPrep  Patient monitoring: blood pressure monitoring, continuous pulse oximetry and EKG  Procedure  Nursing cardiac assessment comments yes: Sedation, GA, Spinal,Epidural   Performed under: general  Guidance:ultrasound guided  ULTRASOUND INTERPRETATION. Using ultrasound guidance a 20 G (20g 4\" Stimuplex) gauge needle was placed in close proximity to the nerve, at which point, under ultrasound guidance anesthetic was injected in the area of the nerve and spread of the anesthesia was seen on ultrasound in close proximity thereto.  There were no abnormalities seen on ultrasound; a digital image was taken; and the patient tolerated the procedure with no complications. Images:still images obtained    Laterality:Bilateral  Block Type:TAP  Injection Technique:single-shot  Needle Type:short-bevel  Needle Gauge:20 G  Resistance on Injection: none    Medications Used: ropivacaine (NAROPIN) injection 0.5 %, 200 mg  Med admintered at 5/14/2020 1:57 PM      Medications  Comment:Block Injection:  Total volume divided equally between Right and Left block        Post Assessment  Injection Assessment: negative aspiration for heme, incremental injection and no paresthesia on injection  Patient Tolerance:comfortable throughout block  Complications:no  Additional Notes  The pt was in the supine position under general anesthesia.    Under Ultrasound guidance, a BBraun 4inch 360 degree needle was advanced with Normal Saline hydro " dissection of tissue.  The Internal Oblique and Transversus Abdominus muscles where visualized.  At or before the aponeurosis of Internal Oblique, local anesthetic spread was visualized in the Transversus Abdominus Plane. Injection was made incrementally with aspiration every 5 mls.  There was no  intravascular injection,  injection pressure was normal, there was no neural injection, and the procedure was completed without difficulty. The same procedure was completed for left and right sided tap blocks. Thank You.

## 2020-05-14 NOTE — OP NOTE
VENTRAL HERNIA REPAIR LAPAROSCOPIC WITH DAVINCI ROBOT  Procedure Note    Mayra Hays  5/14/2020    Pre-op Diagnosis:   SBO (small bowel obstruction) (CMS/HCC) [K56.609]    Post-op Diagnosis:     Post-Op Diagnosis Codes:     * SBO (small bowel obstruction) (CMS/HCC) [K56.609]    Procedure(s):  VENTRAL HERNIA REPAIR LAPAROSCOPIC WITH DAVINCI ROBOT    Surgeon(s):  Petr Velásquez MD    Anesthesia: General    Staff:   Circulator: Owen Joshua RN; Norma Velazco RN  Scrub Person: Shena Jameson  Assistant: Vasu Rodriguez    Findings: Incisional hernia at the umbilicus with 2 defects the total span of which was 4 x 2.5 cm.  Repair performed with 11 cm ventral light echo mesh after primary closure.  Incarcerated densely adherent small bowel to the hernia sac was removed after careful lysis of adhesions with no evidence of enterotomy.    Operative Procedure: Patient was taken operating suite placed upon operating table.  Bilateral sequential compression devices were in place and general anesthesia was administered.  A Shetty catheter was inserted.  The abdomen was prepped and draped in usual sterile fashion after anesthesia performed a tap block.  After timeout was performed and preoperative antibiotics had been confirmed a Veress needle was inserted at Rodrigez's point saline drop test confirmed entry into the peritoneal space.  Pneumoperitoneum was established.  An 8 mm optical trocar was inserted at the Veress needle entry site and there was no evidence of underlying injury.  Laparoscopic surgery showed a single loop of small bowel and omentum contained within the hernia densely adherent to the hernia sac.  There was no evidence of ischemic change.  A 12 mm mid abdominal left anterior axillary line trocar was placed as well as a left lower quadrant 8 mm robotic trocar.  After visualization and measurement of the defect approximately 4 x 2.5 cm an 11 mm ventral light echo mesh was inserted as were the  sutures for primary fascial closure and mesh fixation.  The robotic instruments were then inserted after the robot had been docked to the patient.  Beginning with careful dissection using sharp dissection and blunt dissection the small bowel and omentum were freed from the hernia sac.  Portions of the adhered small bowel there were dense adhesions with very little plane to be identified but after several minutes of careful dissection the plane was identified an area of thickened scar tissue tethering the small bowel to the hernia sac was transected and the bowel was freed and reduced.  The bowel was visually inspected and there was no evidence of enterotomy or serosal injury.  The hernia sac area was hemostatic and at this time primary closure was performed.  A 0 V-Lock suture was used to approximate the 2 hernia defects in a running fashion with the pneumoperitoneum reduced to 9 mmHg.  During primary approximation the hernia sac was incorporated into the closure to reduce the dead space and prevent seroma.  Following primary closure the ventral light echo mesh measuring 11 cm was then pulled through a stab incision at the central portion of the hernia defects using a Gal-Hugo device.  The fixation suture was pulled to the anterior abdominal wall and positioned in the place.  There was adequate overlap of the hernia defect circumferentially.  Using a series of 3 2-0 V lock sutures the mesh was fixed to the anterior abdominal wall circumferentially.  Following complete fixation all needles were removed and counts were correct.  The field was hemostatic and the small bowel was once again inspected with no evidence of serosal injury or enterotomy.  At this time the robot instruments were removed and the robot was undocked from the patient.  The laparoscope was reinserted and the fixation wire from the echo mesh was removed intact.  All counts were correct and at this time the 12 mm trocar site was closed with a  0 Vicryl suture using the Gal-Hugo device.  Pneumoperitoneum was evacuated after all trochars have been removed under direct visualization.  The 12 mm fascial site was approximated and all skin incisions closed with Monocryl subcuticular suture and dressed with skin affix.  The Shetty catheter was removed and the patient was awakened from anesthesia and taken to recovery.    Estimated Blood Loss: 10 mL    Specimens:   None           Drains: None    Grafts/Implants: 11 cm ventral light echo IPOM    Complications: None      Petr Velásquez MD     Date: 5/14/2020  Time: 15:58

## 2020-05-14 NOTE — NURSING NOTE
[facility] ADMISSION NOTE:  Patient enrolled in the Transition program to assist with education and support during transition home from hospital. Transition home  will see patient at home within 48 hours of discharge and will follow up with patient in home and telephonically for 6 weeks post discharge under the [facility] Model.

## 2020-05-15 LAB
ALBUMIN SERPL-MCNC: 3.29 G/DL (ref 3.5–5.2)
ALBUMIN/GLOB SERPL: 1.1 G/DL
ALP SERPL-CCNC: 86 U/L (ref 39–117)
ALT SERPL W P-5'-P-CCNC: 18 U/L (ref 1–33)
ANION GAP SERPL CALCULATED.3IONS-SCNC: 12 MMOL/L (ref 5–15)
APTT PPP: 23.8 SECONDS (ref 23.8–36.1)
AST SERPL-CCNC: 15 U/L (ref 1–32)
BASOPHILS # BLD AUTO: 0.05 10*3/MM3 (ref 0–0.2)
BASOPHILS # BLD AUTO: 0.06 10*3/MM3 (ref 0–0.2)
BASOPHILS NFR BLD AUTO: 0.6 % (ref 0–1.5)
BASOPHILS NFR BLD AUTO: 0.6 % (ref 0–1.5)
BILIRUB SERPL-MCNC: 0.2 MG/DL (ref 0.2–1.2)
BUN BLD-MCNC: 7 MG/DL (ref 8–23)
BUN/CREAT SERPL: 9.2 (ref 7–25)
CALCIUM SPEC-SCNC: 7.9 MG/DL (ref 8.6–10.5)
CHLORIDE SERPL-SCNC: 110 MMOL/L (ref 98–107)
CO2 SERPL-SCNC: 22 MMOL/L (ref 22–29)
CREAT BLD-MCNC: 0.76 MG/DL (ref 0.57–1)
DEPRECATED RDW RBC AUTO: 43.6 FL (ref 37–54)
DEPRECATED RDW RBC AUTO: 45.2 FL (ref 37–54)
EOSINOPHIL # BLD AUTO: 0.12 10*3/MM3 (ref 0–0.4)
EOSINOPHIL # BLD AUTO: 0.26 10*3/MM3 (ref 0–0.4)
EOSINOPHIL NFR BLD AUTO: 1.4 % (ref 0.3–6.2)
EOSINOPHIL NFR BLD AUTO: 2.8 % (ref 0.3–6.2)
ERYTHROCYTE [DISTWIDTH] IN BLOOD BY AUTOMATED COUNT: 13.4 % (ref 12.3–15.4)
ERYTHROCYTE [DISTWIDTH] IN BLOOD BY AUTOMATED COUNT: 13.6 % (ref 12.3–15.4)
GFR SERPL CREATININE-BSD FRML MDRD: 75 ML/MIN/1.73
GLOBULIN UR ELPH-MCNC: 2.9 GM/DL
GLUCOSE BLD-MCNC: 88 MG/DL (ref 65–99)
GLUCOSE BLDC GLUCOMTR-MCNC: 122 MG/DL (ref 70–130)
GLUCOSE BLDC GLUCOMTR-MCNC: 148 MG/DL (ref 70–130)
GLUCOSE BLDC GLUCOMTR-MCNC: 149 MG/DL (ref 70–130)
GLUCOSE BLDC GLUCOMTR-MCNC: 92 MG/DL (ref 70–130)
GLUCOSE BLDC GLUCOMTR-MCNC: 96 MG/DL (ref 70–130)
HCT VFR BLD AUTO: 35.9 % (ref 34–46.6)
HCT VFR BLD AUTO: 36.7 % (ref 34–46.6)
HGB BLD-MCNC: 10.8 G/DL (ref 12–15.9)
HGB BLD-MCNC: 11.3 G/DL (ref 12–15.9)
IMM GRANULOCYTES # BLD AUTO: 0.09 10*3/MM3 (ref 0–0.05)
IMM GRANULOCYTES # BLD AUTO: 0.09 10*3/MM3 (ref 0–0.05)
IMM GRANULOCYTES NFR BLD AUTO: 1 % (ref 0–0.5)
IMM GRANULOCYTES NFR BLD AUTO: 1 % (ref 0–0.5)
INR PPP: 1 (ref 0.9–1.1)
LYMPHOCYTES # BLD AUTO: 1.29 10*3/MM3 (ref 0.7–3.1)
LYMPHOCYTES # BLD AUTO: 1.39 10*3/MM3 (ref 0.7–3.1)
LYMPHOCYTES NFR BLD AUTO: 13.8 % (ref 19.6–45.3)
LYMPHOCYTES NFR BLD AUTO: 15.7 % (ref 19.6–45.3)
MAGNESIUM SERPL-MCNC: 1.8 MG/DL (ref 1.6–2.4)
MCH RBC QN AUTO: 27.2 PG (ref 26.6–33)
MCH RBC QN AUTO: 27.3 PG (ref 26.6–33)
MCHC RBC AUTO-ENTMCNC: 30.1 G/DL (ref 31.5–35.7)
MCHC RBC AUTO-ENTMCNC: 30.8 G/DL (ref 31.5–35.7)
MCV RBC AUTO: 88.6 FL (ref 79–97)
MCV RBC AUTO: 90.4 FL (ref 79–97)
MONOCYTES # BLD AUTO: 0.83 10*3/MM3 (ref 0.1–0.9)
MONOCYTES # BLD AUTO: 0.97 10*3/MM3 (ref 0.1–0.9)
MONOCYTES NFR BLD AUTO: 10.4 % (ref 5–12)
MONOCYTES NFR BLD AUTO: 9.3 % (ref 5–12)
NEUTROPHILS # BLD AUTO: 6.4 10*3/MM3 (ref 1.7–7)
NEUTROPHILS # BLD AUTO: 6.65 10*3/MM3 (ref 1.7–7)
NEUTROPHILS NFR BLD AUTO: 71.4 % (ref 42.7–76)
NEUTROPHILS NFR BLD AUTO: 72 % (ref 42.7–76)
NRBC BLD AUTO-RTO: 0 /100 WBC (ref 0–0.2)
NRBC BLD AUTO-RTO: 0 /100 WBC (ref 0–0.2)
PHOSPHATE SERPL-MCNC: 2.9 MG/DL (ref 2.5–4.5)
PLATELET # BLD AUTO: 181 10*3/MM3 (ref 140–450)
PLATELET # BLD AUTO: 188 10*3/MM3 (ref 140–450)
PMV BLD AUTO: 11 FL (ref 6–12)
PMV BLD AUTO: 11.4 FL (ref 6–12)
POTASSIUM BLD-SCNC: 3.9 MMOL/L (ref 3.5–5.2)
PROT SERPL-MCNC: 6.2 G/DL (ref 6–8.5)
PROTHROMBIN TIME: 13.7 SECONDS (ref 11–15.4)
RBC # BLD AUTO: 3.97 10*6/MM3 (ref 3.77–5.28)
RBC # BLD AUTO: 4.14 10*6/MM3 (ref 3.77–5.28)
SODIUM BLD-SCNC: 144 MMOL/L (ref 136–145)
WBC NRBC COR # BLD: 8.88 10*3/MM3 (ref 3.4–10.8)
WBC NRBC COR # BLD: 9.32 10*3/MM3 (ref 3.4–10.8)

## 2020-05-15 PROCEDURE — 99232 SBSQ HOSP IP/OBS MODERATE 35: CPT | Performed by: INTERNAL MEDICINE

## 2020-05-15 PROCEDURE — 99024 POSTOP FOLLOW-UP VISIT: CPT | Performed by: SURGERY

## 2020-05-15 PROCEDURE — 94799 UNLISTED PULMONARY SVC/PX: CPT

## 2020-05-15 PROCEDURE — 25010000002 PIPERACILLIN SOD-TAZOBACTAM PER 1 G: Performed by: SURGERY

## 2020-05-15 PROCEDURE — 80053 COMPREHEN METABOLIC PANEL: CPT | Performed by: SURGERY

## 2020-05-15 PROCEDURE — 85730 THROMBOPLASTIN TIME PARTIAL: CPT | Performed by: INTERNAL MEDICINE

## 2020-05-15 PROCEDURE — 84100 ASSAY OF PHOSPHORUS: CPT | Performed by: SURGERY

## 2020-05-15 PROCEDURE — 82962 GLUCOSE BLOOD TEST: CPT

## 2020-05-15 PROCEDURE — 85025 COMPLETE CBC W/AUTO DIFF WBC: CPT | Performed by: SURGERY

## 2020-05-15 PROCEDURE — 85025 COMPLETE CBC W/AUTO DIFF WBC: CPT | Performed by: INTERNAL MEDICINE

## 2020-05-15 PROCEDURE — 25010000002 MORPHINE PER 10 MG: Performed by: SURGERY

## 2020-05-15 PROCEDURE — 25010000002 HEPARIN (PORCINE) PER 1000 UNITS: Performed by: INTERNAL MEDICINE

## 2020-05-15 PROCEDURE — 83735 ASSAY OF MAGNESIUM: CPT | Performed by: SURGERY

## 2020-05-15 PROCEDURE — 85610 PROTHROMBIN TIME: CPT | Performed by: INTERNAL MEDICINE

## 2020-05-15 RX ORDER — HEPARIN SODIUM 5000 [USP'U]/ML
2500 INJECTION, SOLUTION INTRAVENOUS; SUBCUTANEOUS AS NEEDED
Status: DISCONTINUED | OUTPATIENT
Start: 2020-05-15 | End: 2020-05-19

## 2020-05-15 RX ORDER — HEPARIN SODIUM 5000 [USP'U]/ML
5000 INJECTION, SOLUTION INTRAVENOUS; SUBCUTANEOUS AS NEEDED
Status: DISCONTINUED | OUTPATIENT
Start: 2020-05-15 | End: 2020-05-19

## 2020-05-15 RX ORDER — HEPARIN SODIUM 10000 [USP'U]/100ML
9.8 INJECTION, SOLUTION INTRAVENOUS
Status: DISCONTINUED | OUTPATIENT
Start: 2020-05-15 | End: 2020-05-19

## 2020-05-15 RX ADMIN — MORPHINE SULFATE 2 MG: 2 INJECTION, SOLUTION INTRAMUSCULAR; INTRAVENOUS at 05:50

## 2020-05-15 RX ADMIN — PIPERACILLIN SODIUM AND TAZOBACTAM SODIUM 3.38 G: 3; .375 INJECTION, POWDER, LYOPHILIZED, FOR SOLUTION INTRAVENOUS at 12:53

## 2020-05-15 RX ADMIN — METOPROLOL TARTRATE 2.5 MG: 1 INJECTION, SOLUTION INTRAVENOUS at 23:01

## 2020-05-15 RX ADMIN — PANTOPRAZOLE SODIUM 40 MG: 40 INJECTION, POWDER, FOR SOLUTION INTRAVENOUS at 05:43

## 2020-05-15 RX ADMIN — HEPARIN SODIUM 9.8 UNITS/KG/HR: 10000 INJECTION, SOLUTION INTRAVENOUS at 20:03

## 2020-05-15 RX ADMIN — MORPHINE SULFATE 2 MG: 2 INJECTION, SOLUTION INTRAMUSCULAR; INTRAVENOUS at 00:31

## 2020-05-15 RX ADMIN — MORPHINE SULFATE 2 MG: 2 INJECTION, SOLUTION INTRAMUSCULAR; INTRAVENOUS at 23:01

## 2020-05-15 RX ADMIN — PIPERACILLIN SODIUM AND TAZOBACTAM SODIUM 3.38 G: 3; .375 INJECTION, POWDER, LYOPHILIZED, FOR SOLUTION INTRAVENOUS at 20:14

## 2020-05-15 RX ADMIN — BUDESONIDE AND FORMOTEROL FUMARATE DIHYDRATE 2 PUFF: 80; 4.5 AEROSOL RESPIRATORY (INHALATION) at 18:58

## 2020-05-15 RX ADMIN — METOPROLOL TARTRATE 2.5 MG: 1 INJECTION, SOLUTION INTRAVENOUS at 17:51

## 2020-05-15 RX ADMIN — MORPHINE SULFATE 2 MG: 2 INJECTION, SOLUTION INTRAMUSCULAR; INTRAVENOUS at 17:55

## 2020-05-15 RX ADMIN — PIPERACILLIN SODIUM AND TAZOBACTAM SODIUM 3.38 G: 3; .375 INJECTION, POWDER, LYOPHILIZED, FOR SOLUTION INTRAVENOUS at 05:43

## 2020-05-15 RX ADMIN — SODIUM CHLORIDE 75 ML/HR: 9 INJECTION, SOLUTION INTRAVENOUS at 20:13

## 2020-05-15 RX ADMIN — MORPHINE SULFATE 2 MG: 2 INJECTION, SOLUTION INTRAMUSCULAR; INTRAVENOUS at 12:58

## 2020-05-15 RX ADMIN — METOPROLOL TARTRATE 2.5 MG: 1 INJECTION, SOLUTION INTRAVENOUS at 12:53

## 2020-05-15 RX ADMIN — SODIUM CHLORIDE 100 ML/HR: 9 INJECTION, SOLUTION INTRAVENOUS at 00:32

## 2020-05-15 RX ADMIN — BUDESONIDE AND FORMOTEROL FUMARATE DIHYDRATE 2 PUFF: 80; 4.5 AEROSOL RESPIRATORY (INHALATION) at 06:52

## 2020-05-15 RX ADMIN — SODIUM CHLORIDE, PRESERVATIVE FREE 10 ML: 5 INJECTION INTRAVENOUS at 08:10

## 2020-05-15 NOTE — PLAN OF CARE
Pt resting in bed with no complaints at this time. No s/s of distress noted. Will continue with plan of care.       Problem: Patient Care Overview  Goal: Plan of Care Review  Outcome: Ongoing (interventions implemented as appropriate)

## 2020-05-15 NOTE — PROGRESS NOTES
Antimicrobial Length of Therapy:    Day 3 of 5 Zosyn    Thank you.  Richa Patino, Pharm.D.  5/15/2020  16:21

## 2020-05-15 NOTE — PROGRESS NOTES
Discharge Planning Assessment   Pasquale     Patient Name: Mayra Hays  MRN: [id number]  Today's Date: 5/15/2020    Admit Date: 5/12/2020        Discharge Plan     Row Name 05/15/20 1349       Plan    Plan  Pt admitted on 5/12/20.  Pt lives at home alone and plans to return home at discharge.  Pt currently does not utilize home health services.  Pt currently utilizes rolling walker, w/c and nebulizer via unknown provider.  SS will follow and assist with discharge needs.        KASH VizcarraW

## 2020-05-15 NOTE — PLAN OF CARE
Pt resting with complaints of pain relieved by morphine. NG tube clamped. In no acute distress. Will continue to monitor and follow plan of care.

## 2020-05-15 NOTE — PROGRESS NOTES
Chief complaint: Incarcerated ventral hernia with small bowel obstruct      Postoperative day #1: Robotic incarcerated ventral hernia repair with mesh    Patient doing well overnight.  Normal postoperative pain reported.  She states she has passed some flatus.  Minimal NG output.  Incisions are clean dry and intact    Abdomen is soft, appropriately tender  Incisions clean dry and intact    74-year-old female on chronic anticoagulation presenting with incarcerated ventral hernia.  Patient underwent robotic repair after NG tube decompression.  She has had some flatus overnight and her NG tube will be removed.  We will initiate clear liquids without carbonation and surgery will advance.  Okay to resume anticoagulation later today.

## 2020-05-15 NOTE — PROGRESS NOTES
"Assisted By: Sincere RAMIREZ    CC: Follow-up on hernia surgery and paroxysmal atrial fibrillation    Interview History/HPI: Patient has tolerated some clear liquids today, she states her abdomen is \"sore\", she has had a small amount of flatus this morning but no BM yet.  No nausea no vomiting she feels like her breathing is doing okay no chest pain.  She does have oxygen in place about 2 L, saturation is 93 to 94%.  She does wear oxygen as needed at home, she has a concentrator.    Vitals:    05/15/20 1253   BP:    Pulse: 64   Resp:    Temp:    SpO2:          Intake/Output Summary (Last 24 hours) at 5/15/2020 1329  Last data filed at 5/15/2020 1100  Gross per 24 hour   Intake 3243.58 ml   Output 750 ml   Net 2493.58 ml       EXAM: 154/75, respiratory rate is 18, pulse 60 and regular, temperature 98.2, she is awake and alert no respiratory distress no JVD skin warm and dry lungs have bilateral breath sounds are clear throughout no rhonchi rales or wheezing, mildly diminished bases no edema, strength symmetric, abdomen is soft bowel sounds are heard the incision left lower quadrant area there are 2 small incisions that appear to be healing well.  Abdomen is diffusely tender but no peritoneal sounds, mild distention    Tele: Sinus rhythm    LABS:   Lab Results (last 48 hours)     Procedure Component Value Units Date/Time    POC Glucose Once [[id number]]  (Abnormal) Collected:  05/15/20 1010    Specimen:  Blood Updated:  05/15/20 1017     Glucose 149 mg/dL     POC Glucose Once [[id number]]  (Normal) Collected:  05/15/20 0627    Specimen:  Blood Updated:  05/15/20 0643     Glucose 96 mg/dL     Phosphorus [[id number]]  (Normal) Collected:  05/15/20 0404    Specimen:  Blood Updated:  05/15/20 0445     Phosphorus 2.9 mg/dL     Comprehensive Metabolic Panel [[id number]]  (Abnormal) Collected:  05/15/20 0404    Specimen:  Blood Updated:  05/15/20 0440     Glucose 88 mg/dL      BUN 7 mg/dL      Creatinine 0.76 mg/dL      Sodium 144 " mmol/L      Potassium 3.9 mmol/L      Chloride 110 mmol/L      CO2 22.0 mmol/L      Calcium 7.9 mg/dL      Total Protein 6.2 g/dL      Albumin 3.29 g/dL      ALT (SGPT) 18 U/L      AST (SGOT) 15 U/L      Alkaline Phosphatase 86 U/L      Total Bilirubin 0.2 mg/dL      eGFR Non African Amer 75 mL/min/1.73      Globulin 2.9 gm/dL      A/G Ratio 1.1 g/dL      BUN/Creatinine Ratio 9.2     Anion Gap 12.0 mmol/L     Narrative:       GFR Normal >60  Chronic Kidney Disease <60  Kidney Failure <15      Magnesium [[id number]]  (Normal) Collected:  05/15/20 0404    Specimen:  Blood Updated:  05/15/20 0440     Magnesium 1.8 mg/dL     CBC & Differential [[id number]] Collected:  05/15/20 0404    Specimen:  Blood Updated:  05/15/20 0414    Narrative:       The following orders were created for panel order CBC & Differential.  Procedure                               Abnormality         Status                     ---------                               -----------         ------                     CBC Auto Differential[[id number]]        Abnormal            Final result                 Please view results for these tests on the individual orders.    CBC Auto Differential [[id number]]  (Abnormal) Collected:  05/15/20 0404    Specimen:  Blood Updated:  05/15/20 0414     WBC 8.88 10*3/mm3      RBC 4.14 10*6/mm3      Hemoglobin 11.3 g/dL      Hematocrit 36.7 %      MCV 88.6 fL      MCH 27.3 pg      MCHC 30.8 g/dL      RDW 13.4 %      RDW-SD 43.6 fl      MPV 11.0 fL      Platelets 188 10*3/mm3      Neutrophil % 72.0 %      Lymphocyte % 15.7 %      Monocyte % 9.3 %      Eosinophil % 1.4 %      Basophil % 0.6 %      Immature Grans % 1.0 %      Neutrophils, Absolute 6.40 10*3/mm3      Lymphocytes, Absolute 1.39 10*3/mm3      Monocytes, Absolute 0.83 10*3/mm3      Eosinophils, Absolute 0.12 10*3/mm3      Basophils, Absolute 0.05 10*3/mm3      Immature Grans, Absolute 0.09 10*3/mm3      nRBC 0.0 /100 WBC     POC Glucose Once [[id number]]  (Normal)  Collected:  05/15/20 0020    Specimen:  Blood Updated:  05/15/20 0027     Glucose 92 mg/dL     Blood Culture - Blood, Arm, Left [[id number]] Collected:  05/12/20 2330    Specimen:  Blood from Arm, Left Updated:  05/14/20 2345     Blood Culture No growth at 2 days    Blood Culture - Blood, Arm, Right [[id number]] Collected:  05/12/20 2317    Specimen:  Blood from Arm, Right Updated:  05/14/20 2330     Blood Culture No growth at 2 days    POC Glucose Once [[id number]]  (Abnormal) Collected:  05/14/20 2055    Specimen:  Blood Updated:  05/14/20 2101     Glucose 138 mg/dL     POC Glucose Once [[id number]]  (Abnormal) Collected:  05/14/20 1627    Specimen:  Blood Updated:  05/14/20 1639     Glucose 308 mg/dL     POC Glucose Once [[id number]]  (Abnormal) Collected:  05/14/20 1303    Specimen:  Blood Updated:  05/14/20 1311     Glucose 69 mg/dL     POC Glucose Once [[id number]]  (Normal) Collected:  05/14/20 1013    Specimen:  Blood Updated:  05/14/20 1042     Glucose 72 mg/dL     POC Glucose Once [[id number]]  (Normal) Collected:  05/14/20 0611    Specimen:  Blood Updated:  05/14/20 0635     Glucose 79 mg/dL     Comprehensive Metabolic Panel [[id number]]  (Abnormal) Collected:  05/14/20 0445    Specimen:  Blood Updated:  05/14/20 0530     Glucose 79 mg/dL      BUN 11 mg/dL      Creatinine 0.79 mg/dL      Sodium 138 mmol/L      Potassium 3.7 mmol/L      Chloride 107 mmol/L      CO2 17.8 mmol/L      Calcium 8.3 mg/dL      Total Protein 6.1 g/dL      Albumin 3.16 g/dL      ALT (SGPT) 14 U/L      AST (SGOT) 14 U/L      Alkaline Phosphatase 91 U/L      Total Bilirubin 0.2 mg/dL      eGFR Non African Amer 71 mL/min/1.73      Globulin 2.9 gm/dL      A/G Ratio 1.1 g/dL      BUN/Creatinine Ratio 13.9     Anion Gap 13.2 mmol/L     Narrative:       GFR Normal >60  Chronic Kidney Disease <60  Kidney Failure <15      Phosphorus [[id number]]  (Abnormal) Collected:  05/14/20 0445    Specimen:  Blood Updated:  05/14/20 0530     Phosphorus 2.0  mg/dL     Magnesium [[id number]]  (Normal) Collected:  05/14/20 0445    Specimen:  Blood Updated:  05/14/20 0530     Magnesium 1.9 mg/dL     aPTT [[id number]]  (Normal) Collected:  05/14/20 0445    Specimen:  Blood Updated:  05/14/20 0517     PTT 30.2 seconds     Narrative:       PTT Heparin Therapeutic Range:  59 - 95 seconds      Protime-INR [[id number]]  (Normal) Collected:  05/14/20 0445    Specimen:  Blood Updated:  05/14/20 0517     Protime 13.6 Seconds      INR 0.99    Narrative:       Suggested INR therapeutic range for stable oral anticoagulant therapy:    Low Intensity therapy:   1.5-2.0  Moderate Intensity therapy:   2.0-3.0  High Intensity therapy:   2.5-4.0    CBC & Differential [[id number]] Collected:  05/14/20 0445    Specimen:  Blood Updated:  05/14/20 0508    Narrative:       The following orders were created for panel order CBC & Differential.  Procedure                               Abnormality         Status                     ---------                               -----------         ------                     CBC Auto Differential[[id number]]        Abnormal            Final result                 Please view results for these tests on the individual orders.    CBC Auto Differential [[id number]]  (Abnormal) Collected:  05/14/20 0445    Specimen:  Blood Updated:  05/14/20 0508     WBC 6.48 10*3/mm3      RBC 4.20 10*6/mm3      Hemoglobin 11.4 g/dL      Hematocrit 38.1 %      MCV 90.7 fL      MCH 27.1 pg      MCHC 29.9 g/dL      RDW 13.2 %      RDW-SD 43.8 fl      MPV 11.5 fL      Platelets 189 10*3/mm3      Neutrophil % 58.7 %      Lymphocyte % 24.7 %      Monocyte % 8.8 %      Eosinophil % 6.0 %      Basophil % 0.9 %      Immature Grans % 0.9 %      Neutrophils, Absolute 3.80 10*3/mm3      Lymphocytes, Absolute 1.60 10*3/mm3      Monocytes, Absolute 0.57 10*3/mm3      Eosinophils, Absolute 0.39 10*3/mm3      Basophils, Absolute 0.06 10*3/mm3      Immature Grans, Absolute 0.06 10*3/mm3      nRBC 0.0  /100 WBC     POC Glucose Once [[id number]]  (Normal) Collected:  05/14/20 0050    Specimen:  Blood Updated:  05/14/20 0057     Glucose 82 mg/dL     POC Glucose Once [[id number]]  (Normal) Collected:  05/13/20 1915    Specimen:  Blood Updated:  05/13/20 1922     Glucose 80 mg/dL     POC Glucose Once [[id number]]  (Normal) Collected:  05/13/20 1627    Specimen:  Blood Updated:  05/13/20 1633     Glucose 87 mg/dL           Radiology:    Imaging Results (Last 72 Hours)     Procedure Component Value Units Date/Time    XR Chest 1 View [[id number]] Collected:  05/13/20 0043     Updated:  05/13/20 0045    Narrative:       CR Chest 1 Vw    INDICATION:   Evaluate nasogastric tube placement     COMPARISON:    None available.    FINDINGS:  Single portable AP view(s) of the chest.    This image is centered over the diaphragms. The nasogastric is tip in the body the stomach. The visible lungs are clear    Signer Name: El Goldberg MD   Signed: 5/13/2020 12:43 AM   Workstation Name: [id number]    [facility]    CT Abdomen Pelvis Without Contrast [[id number]] Collected:  05/12/20 2234     Updated:  05/12/20 2236    Narrative:       CT Abdomen Pelvis WO    INDICATION:   History of hernia with left lower quadrant pain tonight    TECHNIQUE:   CT of the abdomen and pelvis without IV contrast. Coronal and sagittal reconstructions were obtained.  Radiation dose reduction techniques included automated exposure control or exposure modulation based on body size. Count of known CT and cardiac nuc  med studies performed in previous 12 months: 1.     COMPARISON:   None available    FINDINGS:  Abdomen: The lung bases are clear. The gallbladder is been removed. Liver, spleen, pancreas, adrenal glands and kidneys are normal. Colon appears normal. The jejunum shows abnormal distention proximally. It measures up to 5.7 cm transverse dimension.  There is an abdominal wall hernia to the right of the umbilicus and this has small  bowel within it. This appears be causing partial obstruction. The bowel proximal to the hernia is distended in the bowel distal to the hernia is collapsed.    Pelvis: Uterus and adnexal regions and bladder are normal. Bones are unremarkable.      Impression:       There appears to be a high-grade small bowel obstruction caused by a small hernia through the abdominal wall to the right of the umbilicus. The opening through the abdominal wall is about 2 cm in diameter. The jejunum proximal to this hernia is dilated  and the small bowel distal to this hernia is collapsed.    Otherwise negative study          Signer Name: El Goldberg MD   Signed: 5/12/2020 10:34 PM   Workstation Name: [email or web address]    [facility]          Results for orders placed during the hospital encounter of 01/25/19   Adult Transthoracic Echo Complete W/ Cont if Necessary Per Protocol    Narrative · A trivial pericardial effusion noted. Adjacent to the right ventricle.  · Right ventricular cavity is borderline dilated.  · Estimated EF = 60%.  · Left ventricular systolic function is normal.  · No prev echo          Assessment/Plan:   Ventral hernia status post laparoscopic repair yesterday, diet has been advanced, per surgery note, okay to start anticoagulation later today.  Awaiting full bowel function return, hemoglobin is stable.    History of paroxysmal atrial fibrillation, will begin IV heparin tonight without initial bolus, if she does well and hemoglobin stable, consider transitioning over to Eliquis tomorrow.    Hypoxia, continue oxygen, will improve as she is up moving around, I have added incentive spirometry.    Hypertension, borderline control, will follow, some of this is related to pain, if remains elevated tomorrow consider adjustment of medication    DVT prophylaxis, IV heparin will serve for this as well    Low phosphorus, improved

## 2020-05-16 ENCOUNTER — APPOINTMENT (OUTPATIENT)
Dept: GENERAL RADIOLOGY | Facility: HOSPITAL | Age: 75
End: 2020-05-16

## 2020-05-16 LAB
ALBUMIN SERPL-MCNC: 3.12 G/DL (ref 3.5–5.2)
ALBUMIN/GLOB SERPL: 1.1 G/DL
ALP SERPL-CCNC: 79 U/L (ref 39–117)
ALT SERPL W P-5'-P-CCNC: 14 U/L (ref 1–33)
ANION GAP SERPL CALCULATED.3IONS-SCNC: 9.7 MMOL/L (ref 5–15)
APTT PPP: 36.5 SECONDS (ref 23.8–36.1)
APTT PPP: 43.9 SECONDS (ref 23.8–36.1)
APTT PPP: 53.5 SECONDS (ref 23.8–36.1)
AST SERPL-CCNC: 13 U/L (ref 1–32)
BASOPHILS # BLD AUTO: 0.04 10*3/MM3 (ref 0–0.2)
BASOPHILS NFR BLD AUTO: 0.5 % (ref 0–1.5)
BILIRUB SERPL-MCNC: 0.3 MG/DL (ref 0.2–1.2)
BUN BLD-MCNC: 6 MG/DL (ref 8–23)
BUN/CREAT SERPL: 7.7 (ref 7–25)
CALCIUM SPEC-SCNC: 8.1 MG/DL (ref 8.6–10.5)
CHLORIDE SERPL-SCNC: 108 MMOL/L (ref 98–107)
CO2 SERPL-SCNC: 22.3 MMOL/L (ref 22–29)
CREAT BLD-MCNC: 0.78 MG/DL (ref 0.57–1)
CRP SERPL-MCNC: 5.63 MG/DL (ref 0–0.5)
DEPRECATED RDW RBC AUTO: 44.6 FL (ref 37–54)
EOSINOPHIL # BLD AUTO: 0.36 10*3/MM3 (ref 0–0.4)
EOSINOPHIL NFR BLD AUTO: 4.3 % (ref 0.3–6.2)
ERYTHROCYTE [DISTWIDTH] IN BLOOD BY AUTOMATED COUNT: 13.6 % (ref 12.3–15.4)
GFR SERPL CREATININE-BSD FRML MDRD: 72 ML/MIN/1.73
GLOBULIN UR ELPH-MCNC: 2.9 GM/DL
GLUCOSE BLD-MCNC: 107 MG/DL (ref 65–99)
GLUCOSE BLDC GLUCOMTR-MCNC: 109 MG/DL (ref 70–130)
GLUCOSE BLDC GLUCOMTR-MCNC: 115 MG/DL (ref 70–130)
GLUCOSE BLDC GLUCOMTR-MCNC: 122 MG/DL (ref 70–130)
GLUCOSE BLDC GLUCOMTR-MCNC: 134 MG/DL (ref 70–130)
HCT VFR BLD AUTO: 35 % (ref 34–46.6)
HGB BLD-MCNC: 10.6 G/DL (ref 12–15.9)
IMM GRANULOCYTES # BLD AUTO: 0.07 10*3/MM3 (ref 0–0.05)
IMM GRANULOCYTES NFR BLD AUTO: 0.8 % (ref 0–0.5)
LYMPHOCYTES # BLD AUTO: 1.48 10*3/MM3 (ref 0.7–3.1)
LYMPHOCYTES NFR BLD AUTO: 17.7 % (ref 19.6–45.3)
MAGNESIUM SERPL-MCNC: 1.8 MG/DL (ref 1.6–2.4)
MCH RBC QN AUTO: 27.3 PG (ref 26.6–33)
MCHC RBC AUTO-ENTMCNC: 30.3 G/DL (ref 31.5–35.7)
MCV RBC AUTO: 90.2 FL (ref 79–97)
MONOCYTES # BLD AUTO: 0.81 10*3/MM3 (ref 0.1–0.9)
MONOCYTES NFR BLD AUTO: 9.7 % (ref 5–12)
NEUTROPHILS # BLD AUTO: 5.58 10*3/MM3 (ref 1.7–7)
NEUTROPHILS NFR BLD AUTO: 67 % (ref 42.7–76)
NRBC BLD AUTO-RTO: 0 /100 WBC (ref 0–0.2)
PHOSPHATE SERPL-MCNC: 2.1 MG/DL (ref 2.5–4.5)
PLATELET # BLD AUTO: 175 10*3/MM3 (ref 140–450)
PMV BLD AUTO: 11.1 FL (ref 6–12)
POTASSIUM BLD-SCNC: 3.6 MMOL/L (ref 3.5–5.2)
PROT SERPL-MCNC: 6 G/DL (ref 6–8.5)
RBC # BLD AUTO: 3.88 10*6/MM3 (ref 3.77–5.28)
SODIUM BLD-SCNC: 140 MMOL/L (ref 136–145)
WBC NRBC COR # BLD: 8.34 10*3/MM3 (ref 3.4–10.8)

## 2020-05-16 PROCEDURE — 85730 THROMBOPLASTIN TIME PARTIAL: CPT | Performed by: INTERNAL MEDICINE

## 2020-05-16 PROCEDURE — 94799 UNLISTED PULMONARY SVC/PX: CPT

## 2020-05-16 PROCEDURE — 82962 GLUCOSE BLOOD TEST: CPT

## 2020-05-16 PROCEDURE — 86140 C-REACTIVE PROTEIN: CPT | Performed by: SURGERY

## 2020-05-16 PROCEDURE — 85025 COMPLETE CBC W/AUTO DIFF WBC: CPT | Performed by: INTERNAL MEDICINE

## 2020-05-16 PROCEDURE — 80053 COMPREHEN METABOLIC PANEL: CPT | Performed by: INTERNAL MEDICINE

## 2020-05-16 PROCEDURE — 99232 SBSQ HOSP IP/OBS MODERATE 35: CPT | Performed by: INTERNAL MEDICINE

## 2020-05-16 PROCEDURE — 84100 ASSAY OF PHOSPHORUS: CPT | Performed by: INTERNAL MEDICINE

## 2020-05-16 PROCEDURE — 25010000003 POTASSIUM CHLORIDE 10 MEQ/100ML SOLUTION: Performed by: INTERNAL MEDICINE

## 2020-05-16 PROCEDURE — 83735 ASSAY OF MAGNESIUM: CPT | Performed by: INTERNAL MEDICINE

## 2020-05-16 PROCEDURE — 71045 X-RAY EXAM CHEST 1 VIEW: CPT | Performed by: RADIOLOGY

## 2020-05-16 PROCEDURE — 99024 POSTOP FOLLOW-UP VISIT: CPT | Performed by: SURGERY

## 2020-05-16 PROCEDURE — 71045 X-RAY EXAM CHEST 1 VIEW: CPT

## 2020-05-16 PROCEDURE — 25010000002 MORPHINE PER 10 MG: Performed by: SURGERY

## 2020-05-16 PROCEDURE — 94640 AIRWAY INHALATION TREATMENT: CPT

## 2020-05-16 PROCEDURE — 25010000002 PIPERACILLIN SOD-TAZOBACTAM PER 1 G: Performed by: SURGERY

## 2020-05-16 PROCEDURE — 25010000002 HEPARIN (PORCINE) PER 1000 UNITS: Performed by: INTERNAL MEDICINE

## 2020-05-16 RX ORDER — PANTOPRAZOLE SODIUM 40 MG/1
40 TABLET, DELAYED RELEASE ORAL
Status: DISCONTINUED | OUTPATIENT
Start: 2020-05-17 | End: 2020-05-19 | Stop reason: HOSPADM

## 2020-05-16 RX ORDER — IPRATROPIUM BROMIDE AND ALBUTEROL SULFATE 2.5; .5 MG/3ML; MG/3ML
3 SOLUTION RESPIRATORY (INHALATION)
Status: DISCONTINUED | OUTPATIENT
Start: 2020-05-16 | End: 2020-05-19 | Stop reason: HOSPADM

## 2020-05-16 RX ORDER — POTASSIUM CHLORIDE 7.45 MG/ML
10 INJECTION INTRAVENOUS
Status: COMPLETED | OUTPATIENT
Start: 2020-05-16 | End: 2020-05-16

## 2020-05-16 RX ADMIN — HEPARIN SODIUM 5000 UNITS: 5000 INJECTION, SOLUTION INTRAVENOUS; SUBCUTANEOUS at 03:24

## 2020-05-16 RX ADMIN — PIPERACILLIN SODIUM AND TAZOBACTAM SODIUM 3.38 G: 3; .375 INJECTION, POWDER, LYOPHILIZED, FOR SOLUTION INTRAVENOUS at 03:44

## 2020-05-16 RX ADMIN — SODIUM PHOSPHATE, MONOBASIC, MONOHYDRATE 10 MMOL: 276; 142 INJECTION, SOLUTION INTRAVENOUS at 13:12

## 2020-05-16 RX ADMIN — POTASSIUM CHLORIDE 10 MEQ: 7.46 INJECTION, SOLUTION INTRAVENOUS at 13:12

## 2020-05-16 RX ADMIN — HEPARIN SODIUM 17.8 UNITS/KG/HR: 10000 INJECTION, SOLUTION INTRAVENOUS at 15:50

## 2020-05-16 RX ADMIN — SODIUM CHLORIDE 75 ML/HR: 9 INJECTION, SOLUTION INTRAVENOUS at 15:50

## 2020-05-16 RX ADMIN — PANTOPRAZOLE SODIUM 40 MG: 40 INJECTION, POWDER, FOR SOLUTION INTRAVENOUS at 05:23

## 2020-05-16 RX ADMIN — MORPHINE SULFATE 2 MG: 2 INJECTION, SOLUTION INTRAMUSCULAR; INTRAVENOUS at 03:43

## 2020-05-16 RX ADMIN — BUDESONIDE AND FORMOTEROL FUMARATE DIHYDRATE 2 PUFF: 80; 4.5 AEROSOL RESPIRATORY (INHALATION) at 07:32

## 2020-05-16 RX ADMIN — PIPERACILLIN SODIUM AND TAZOBACTAM SODIUM 3.38 G: 3; .375 INJECTION, POWDER, LYOPHILIZED, FOR SOLUTION INTRAVENOUS at 11:56

## 2020-05-16 RX ADMIN — HEPARIN SODIUM 19.8 UNITS/KG/HR: 10000 INJECTION, SOLUTION INTRAVENOUS at 18:38

## 2020-05-16 RX ADMIN — BUDESONIDE AND FORMOTEROL FUMARATE DIHYDRATE 2 PUFF: 80; 4.5 AEROSOL RESPIRATORY (INHALATION) at 19:16

## 2020-05-16 RX ADMIN — SODIUM CHLORIDE, PRESERVATIVE FREE 10 ML: 5 INJECTION INTRAVENOUS at 20:00

## 2020-05-16 RX ADMIN — METOPROLOL TARTRATE 25 MG: 25 TABLET, FILM COATED ORAL at 13:12

## 2020-05-16 RX ADMIN — HEPARIN SODIUM 17.8 UNITS/KG/HR: 10000 INJECTION, SOLUTION INTRAVENOUS at 11:56

## 2020-05-16 RX ADMIN — IPRATROPIUM BROMIDE AND ALBUTEROL SULFATE 3 ML: .5; 3 SOLUTION RESPIRATORY (INHALATION) at 19:16

## 2020-05-16 RX ADMIN — PIPERACILLIN SODIUM AND TAZOBACTAM SODIUM 3.38 G: 3; .375 INJECTION, POWDER, LYOPHILIZED, FOR SOLUTION INTRAVENOUS at 20:00

## 2020-05-16 RX ADMIN — POTASSIUM CHLORIDE 10 MEQ: 7.46 INJECTION, SOLUTION INTRAVENOUS at 14:44

## 2020-05-16 RX ADMIN — MORPHINE SULFATE 2 MG: 2 INJECTION, SOLUTION INTRAMUSCULAR; INTRAVENOUS at 23:06

## 2020-05-16 NOTE — PLAN OF CARE
Pt started on heparin gtt per orders. No s/s of bleeding at this time. Pt has rested throughout night with only using PRN medication 1 time so far. No bowel movement thus far but stated she did pass gas earlier.

## 2020-05-16 NOTE — PROGRESS NOTES
"Assisted By: Alma RAMIREZ    CC: Follow-up on incarcerated hernia    Interview History/HPI: Patient states her abdomen is still \"sore\".  She states she has had flatus, no BM as of yet but she has tolerated liquids and does feel \"hungry\".  She denies chest pain however I did notice she had a congested cough, she is using her incentive spirometer but only gets this to 500-750.  No fever overnight.  She is getting up to bedside commode and voiding.    Vitals:    05/16/20 1027   BP: 126/56   Pulse: 64   Resp: 18   Temp: 98 °F (36.7 °C)   SpO2: 97%         Intake/Output Summary (Last 24 hours) at 5/16/2020 1151  Last data filed at 5/16/2020 0900  Gross per 24 hour   Intake 2396.63 ml   Output 1700 ml   Net 696.63 ml       EXAM: She is pleasant, she appears in no distress but having a cough at times, some upper airway noise at times.  Hearing is intact pupils equal mood is good, no JVD lungs have bilateral breath sounds faint crackle at the right base posterior otherwise clear but diminished at the bases bilaterally she does not appear to be taking deep inspirations.  Heart has a regular rate and rhythm without murmur rub or gallop, abdomen is mildly distended still diffusely tender with some voluntary guarding I would consider bowel sounds hypoactive.  Extremities are without edema.    Tele: Sinus, brief run of SVT this a.m. noted.  Rate of about 140    LABS:   Lab Results (last 48 hours)     Procedure Component Value Units Date/Time    POC Glucose Once [[id number]]  (Normal) Collected:  05/16/20 1026    Specimen:  Blood Updated:  05/16/20 1054     Glucose 109 mg/dL     aPTT [[id number]]  (Abnormal) Collected:  05/16/20 1004    Specimen:  Blood Updated:  05/16/20 1040     PTT 36.5 seconds     Narrative:       PTT Heparin Therapeutic Range:  59 - 95 seconds      POC Glucose Once [[id number]]  (Normal) Collected:  05/16/20 0613    Specimen:  Blood Updated:  05/16/20 0629     Glucose 122 mg/dL     Phosphorus [[id number]]  " (Abnormal) Collected:  05/16/20 0248    Specimen:  Blood Updated:  05/16/20 0330     Phosphorus 2.1 mg/dL     aPTT [[id number]]  (Abnormal) Collected:  05/16/20 0248    Specimen:  Blood Updated:  05/16/20 0321     PTT 43.9 seconds     Narrative:       PTT Heparin Therapeutic Range:  59 - 95 seconds      Comprehensive Metabolic Panel [[id number]]  (Abnormal) Collected:  05/16/20 0248    Specimen:  Blood Updated:  05/16/20 0315     Glucose 107 mg/dL      BUN 6 mg/dL      Creatinine 0.78 mg/dL      Sodium 140 mmol/L      Potassium 3.6 mmol/L      Chloride 108 mmol/L      CO2 22.3 mmol/L      Calcium 8.1 mg/dL      Total Protein 6.0 g/dL      Albumin 3.12 g/dL      ALT (SGPT) 14 U/L      AST (SGOT) 13 U/L      Alkaline Phosphatase 79 U/L      Total Bilirubin 0.3 mg/dL      eGFR Non African Amer 72 mL/min/1.73      Globulin 2.9 gm/dL      A/G Ratio 1.1 g/dL      BUN/Creatinine Ratio 7.7     Anion Gap 9.7 mmol/L     Narrative:       GFR Normal >60  Chronic Kidney Disease <60  Kidney Failure <15      Magnesium [[id number]]  (Normal) Collected:  05/16/20 0248    Specimen:  Blood Updated:  05/16/20 0315     Magnesium 1.8 mg/dL     CBC & Differential [[id number]] Collected:  05/16/20 0248    Specimen:  Blood Updated:  05/16/20 0256    Narrative:       The following orders were created for panel order CBC & Differential.  Procedure                               Abnormality         Status                     ---------                               -----------         ------                     CBC Auto Differential[[id number]]        Abnormal            Final result                 Please view results for these tests on the individual orders.    CBC Auto Differential [[id number]]  (Abnormal) Collected:  05/16/20 0248    Specimen:  Blood Updated:  05/16/20 0256     WBC 8.34 10*3/mm3      RBC 3.88 10*6/mm3      Hemoglobin 10.6 g/dL      Hematocrit 35.0 %      MCV 90.2 fL      MCH 27.3 pg      MCHC 30.3 g/dL      RDW 13.6 %      RDW-SD  44.6 fl      MPV 11.1 fL      Platelets 175 10*3/mm3      Neutrophil % 67.0 %      Lymphocyte % 17.7 %      Monocyte % 9.7 %      Eosinophil % 4.3 %      Basophil % 0.5 %      Immature Grans % 0.8 %      Neutrophils, Absolute 5.58 10*3/mm3      Lymphocytes, Absolute 1.48 10*3/mm3      Monocytes, Absolute 0.81 10*3/mm3      Eosinophils, Absolute 0.36 10*3/mm3      Basophils, Absolute 0.04 10*3/mm3      Immature Grans, Absolute 0.07 10*3/mm3      nRBC 0.0 /100 WBC     Blood Culture - Blood, Arm, Left [[id number]] Collected:  05/12/20 2330    Specimen:  Blood from Arm, Left Updated:  05/15/20 2345     Blood Culture No growth at 3 days    Blood Culture - Blood, Arm, Right [[id number]] Collected:  05/12/20 2317    Specimen:  Blood from Arm, Right Updated:  05/15/20 2330     Blood Culture No growth at 3 days    POC Glucose Once [[id number]]  (Abnormal) Collected:  05/15/20 1926    Specimen:  Blood Updated:  05/15/20 1935     Glucose 148 mg/dL     POC Glucose Once [[id number]]  (Normal) Collected:  05/15/20 1603    Specimen:  Blood Updated:  05/15/20 1613     Glucose 122 mg/dL     Protime-INR [[id number]]  (Normal) Collected:  05/15/20 1354    Specimen:  Blood Updated:  05/15/20 1421     Protime 13.7 Seconds      INR 1.00    Narrative:       Suggested INR therapeutic range for stable oral anticoagulant therapy:    Low Intensity therapy:   1.5-2.0  Moderate Intensity therapy:   2.0-3.0  High Intensity therapy:   2.5-4.0    aPTT [[id number]]  (Normal) Collected:  05/15/20 1354    Specimen:  Blood Updated:  05/15/20 1421     PTT 23.8 seconds     Narrative:       PTT Heparin Therapeutic Range:  59 - 95 seconds      CBC & Differential [[id number]] Collected:  05/15/20 1354    Specimen:  Blood Updated:  05/15/20 1413    Narrative:       The following orders were created for panel order CBC & Differential.  Procedure                               Abnormality         Status                     ---------                                -----------         ------                     CBC Auto Differential[[id number]]        Abnormal            Final result                 Please view results for these tests on the individual orders.    CBC Auto Differential [[id number]]  (Abnormal) Collected:  05/15/20 1354    Specimen:  Blood Updated:  05/15/20 1413     WBC 9.32 10*3/mm3      RBC 3.97 10*6/mm3      Hemoglobin 10.8 g/dL      Hematocrit 35.9 %      MCV 90.4 fL      MCH 27.2 pg      MCHC 30.1 g/dL      RDW 13.6 %      RDW-SD 45.2 fl      MPV 11.4 fL      Platelets 181 10*3/mm3      Neutrophil % 71.4 %      Lymphocyte % 13.8 %      Monocyte % 10.4 %      Eosinophil % 2.8 %      Basophil % 0.6 %      Immature Grans % 1.0 %      Neutrophils, Absolute 6.65 10*3/mm3      Lymphocytes, Absolute 1.29 10*3/mm3      Monocytes, Absolute 0.97 10*3/mm3      Eosinophils, Absolute 0.26 10*3/mm3      Basophils, Absolute 0.06 10*3/mm3      Immature Grans, Absolute 0.09 10*3/mm3      nRBC 0.0 /100 WBC     POC Glucose Once [[id number]]  (Abnormal) Collected:  05/15/20 1010    Specimen:  Blood Updated:  05/15/20 1017     Glucose 149 mg/dL     POC Glucose Once [[id number]]  (Normal) Collected:  05/15/20 0627    Specimen:  Blood Updated:  05/15/20 0643     Glucose 96 mg/dL     Phosphorus [[id number]]  (Normal) Collected:  05/15/20 0404    Specimen:  Blood Updated:  05/15/20 0445     Phosphorus 2.9 mg/dL     Comprehensive Metabolic Panel [[id number]]  (Abnormal) Collected:  05/15/20 0404    Specimen:  Blood Updated:  05/15/20 0440     Glucose 88 mg/dL      BUN 7 mg/dL      Creatinine 0.76 mg/dL      Sodium 144 mmol/L      Potassium 3.9 mmol/L      Chloride 110 mmol/L      CO2 22.0 mmol/L      Calcium 7.9 mg/dL      Total Protein 6.2 g/dL      Albumin 3.29 g/dL      ALT (SGPT) 18 U/L      AST (SGOT) 15 U/L      Alkaline Phosphatase 86 U/L      Total Bilirubin 0.2 mg/dL      eGFR Non African Amer 75 mL/min/1.73      Globulin 2.9 gm/dL      A/G Ratio 1.1 g/dL      BUN/Creatinine  Ratio 9.2     Anion Gap 12.0 mmol/L     Narrative:       GFR Normal >60  Chronic Kidney Disease <60  Kidney Failure <15      Magnesium [[id number]]  (Normal) Collected:  05/15/20 0404    Specimen:  Blood Updated:  05/15/20 0440     Magnesium 1.8 mg/dL     CBC & Differential [[id number]] Collected:  05/15/20 0404    Specimen:  Blood Updated:  05/15/20 0414    Narrative:       The following orders were created for panel order CBC & Differential.  Procedure                               Abnormality         Status                     ---------                               -----------         ------                     CBC Auto Differential[[id number]]        Abnormal            Final result                 Please view results for these tests on the individual orders.    CBC Auto Differential [[id number]]  (Abnormal) Collected:  05/15/20 0404    Specimen:  Blood Updated:  05/15/20 0414     WBC 8.88 10*3/mm3      RBC 4.14 10*6/mm3      Hemoglobin 11.3 g/dL      Hematocrit 36.7 %      MCV 88.6 fL      MCH 27.3 pg      MCHC 30.8 g/dL      RDW 13.4 %      RDW-SD 43.6 fl      MPV 11.0 fL      Platelets 188 10*3/mm3      Neutrophil % 72.0 %      Lymphocyte % 15.7 %      Monocyte % 9.3 %      Eosinophil % 1.4 %      Basophil % 0.6 %      Immature Grans % 1.0 %      Neutrophils, Absolute 6.40 10*3/mm3      Lymphocytes, Absolute 1.39 10*3/mm3      Monocytes, Absolute 0.83 10*3/mm3      Eosinophils, Absolute 0.12 10*3/mm3      Basophils, Absolute 0.05 10*3/mm3      Immature Grans, Absolute 0.09 10*3/mm3      nRBC 0.0 /100 WBC     POC Glucose Once [[id number]]  (Normal) Collected:  05/15/20 0020    Specimen:  Blood Updated:  05/15/20 0027     Glucose 92 mg/dL     POC Glucose Once [[id number]]  (Abnormal) Collected:  05/14/20 2055    Specimen:  Blood Updated:  05/14/20 2101     Glucose 138 mg/dL     POC Glucose Once [[id number]]  (Abnormal) Collected:  05/14/20 1627    Specimen:  Blood Updated:  05/14/20 1639     Glucose 308 mg/dL     POC  Glucose Once [[id number]]  (Abnormal) Collected:  05/14/20 1303    Specimen:  Blood Updated:  05/14/20 1311     Glucose 69 mg/dL           Radiology:    Imaging Results (Last 72 Hours)     ** No results found for the last 72 hours. **          Results for orders placed during the hospital encounter of 01/25/19   Adult Transthoracic Echo Complete W/ Cont if Necessary Per Protocol    Narrative · A trivial pericardial effusion noted. Adjacent to the right ventricle.  · Right ventricular cavity is borderline dilated.  · Estimated EF = 60%.  · Left ventricular systolic function is normal.  · No prev echo      Chest x-ray ordered    Assessment/Plan:   Status post surgery for incarcerated hernia, she is still significantly tender, no bowel movement as of yet, I have discussed with surgery Dr. Bonner, it is possible that the incarcerated piece of bowel could become not viable.  This being the case I am not transitioning her to Eliquis, will continue IV heparin for stroke prevention for her atrial fibrillation.  Will leave diet advancement to surgery discretion.  Bicarb and anion gap remain good.  Patient is on Zosyn therapy as ordered by surgery    History of paroxysmal atrial fibrillation, stable, continue IV heparin for stroke prevention, hemoglobin is stable.    Probable atelectasis, encouraged her on incentive spirometer, I have scheduled nebulizer therapy, I am checking a chest x-ray.  Encourage cough and deep breathing.    Hypophosphatemia, supplement    Borderline potassium of 3.6, with the SVT this a.m., I am going to give 20 mEq IV.    Hypertension, controlled, Lopressor transition to p.o.

## 2020-05-16 NOTE — PROGRESS NOTES
LOS: 4 days   Patient Care Team:  Niki Antony APRN as PCP - General (Nurse Practitioner)  Jonathan Del Valle MD as Consulting Physician (Endocrinology)    Post op day # 2, status post ventral hernia repair    Subjective     Interval History:   Patient states she is still sore. Flatus +, BM -. States got herself up to bedside commode. Doesn't like clear liquids    History taken from patient.      Objective     Vital Signs  Temp:  [97.9 °F (36.6 °C)-98.2 °F (36.8 °C)] 98 °F (36.7 °C)  Heart Rate:  [58-78] 64  Resp:  [18-20] 18  BP: (113-138)/(55-83) 126/56    Physical Exam:  This is a well-developed well-nourished obese female in no acute distress  HEENT examination: Sclera are anicteric  Lungs: Clear  Abdomen: Bowel sounds are present and active.  Patient reports tenderness to palpation in all quadrants.  Did not have a frame of reference as to whether or not this is improved over prior as this is the first time I have examined the patient.  Skin/incisions: Incision sites were inspected and demonstrate no drainage or erythema     Results Review:    Results from last 7 days   Lab Units 05/12/20 2001   TROPONIN T ng/mL <0.010     Results from last 7 days   Lab Units 05/16/20 0248 05/15/20  1354 05/15/20  0404 05/14/20  0445  05/12/20  2317 05/12/20 2001   CRP mg/dL  --   --   --   --   --   --  0.27   LACTATE mmol/L  --   --   --   --   --  0.7  --    WBC 10*3/mm3 8.34 9.32 8.88 6.48   < >  --  6.77   HEMOGLOBIN g/dL 10.6* 10.8* 11.3* 11.4*   < >  --  13.2   HEMATOCRIT % 35.0 35.9 36.7 38.1   < >  --  41.6   PLATELETS 10*3/mm3 175 181 188 189   < >  --  217   INR   --  1.00  --  0.99  --   --   --     < > = values in this interval not displayed.         Results from last 7 days   Lab Units 05/16/20  0248 05/15/20  0404 05/14/20  0445   SODIUM mmol/L 140 144 138   POTASSIUM mmol/L 3.6 3.9 3.7   MAGNESIUM mg/dL 1.8 1.8 1.9   CHLORIDE mmol/L 108* 110* 107   CO2 mmol/L 22.3 22.0 17.8*   BUN mg/dL 6* 7* 11    CREATININE mg/dL 0.78 0.76 0.79   EGFR IF NONAFRICN AM mL/min/1.73 72 75 71   CALCIUM mg/dL 8.1* 7.9* 8.3*   GLUCOSE mg/dL 107* 88 79   ALBUMIN g/dL 3.12* 3.29* 3.16*   BILIRUBIN mg/dL 0.3 0.2 0.2   ALK PHOS U/L 79 86 91   AST (SGOT) U/L 13 15 14   ALT (SGPT) U/L 14 18 14   Estimated Creatinine Clearance: 77.5 mL/min (by C-G formula based on SCr of 0.78 mg/dL).  No results found for: AMMONIA      Blood Culture   Date Value Ref Range Status   05/12/2020 No growth at 3 days  Preliminary   05/12/2020 No growth at 3 days  Preliminary     Urine Culture   Date Value Ref Range Status   05/12/2020 25,000 CFU/mL Mixed Yareli Isolated  Final     No results found for: WOUNDCX  No results found for: STOOLCX    Imaging:  Imaging Results (Last 24 Hours)     Procedure Component Value Units Date/Time    XR Chest AP [[id number]] Collected:  05/16/20 1209     Updated:  05/16/20 1218    Narrative:       XR CHEST AP-     CLINICAL INDICATION: cough; K56.609-Unspecified intestinal obstruction,  unspecified as to partial versus complete obstruction        COMPARISON: 5/13/2020 and 12/18/2019      TECHNIQUE: Single frontal view of the chest.     FINDINGS:     Trace bibasilar effusions  Appearance suggestive of congestive heart failure  There is no evidence of an acute osseous abnormality.   There are no suspicious-appearing parenchymal soft tissue nodules.          Impression:       Trace bibasilar effusions and right basilar consolidation  CHF     This report was finalized on 5/16/2020 12:10 PM by Dr. Mauricio Santiago MD.              Impression:  S/p incisional hernia repair.  Given active level of bowel sounds index of suspicion for underlying bowel injury is low    Plan:  Check CRP now and in am  Full liquid diet  Continue heparin for now    Gayla Bonner MD  05/16/20  13:03      Please note that portions of this note were completed with a voice recognition program.

## 2020-05-17 LAB
ALBUMIN SERPL-MCNC: 3.12 G/DL (ref 3.5–5.2)
ALBUMIN/GLOB SERPL: 1.1 G/DL
ALP SERPL-CCNC: 79 U/L (ref 39–117)
ALT SERPL W P-5'-P-CCNC: 13 U/L (ref 1–33)
ANION GAP SERPL CALCULATED.3IONS-SCNC: 11.5 MMOL/L (ref 5–15)
APTT PPP: 44.5 SECONDS (ref 23.8–36.1)
APTT PPP: 82.8 SECONDS (ref 23.8–36.1)
APTT PPP: >100 SECONDS (ref 23.8–36.1)
AST SERPL-CCNC: 13 U/L (ref 1–32)
BACTERIA SPEC AEROBE CULT: NORMAL
BACTERIA SPEC AEROBE CULT: NORMAL
BILIRUB SERPL-MCNC: 0.3 MG/DL (ref 0.2–1.2)
BUN BLD-MCNC: 6 MG/DL (ref 8–23)
BUN/CREAT SERPL: 8.5 (ref 7–25)
CALCIUM SPEC-SCNC: 8.2 MG/DL (ref 8.6–10.5)
CHLORIDE SERPL-SCNC: 106 MMOL/L (ref 98–107)
CO2 SERPL-SCNC: 22.5 MMOL/L (ref 22–29)
CREAT BLD-MCNC: 0.71 MG/DL (ref 0.57–1)
CRP SERPL-MCNC: 10.61 MG/DL (ref 0–0.5)
GFR SERPL CREATININE-BSD FRML MDRD: 81 ML/MIN/1.73
GLOBULIN UR ELPH-MCNC: 2.9 GM/DL
GLUCOSE BLD-MCNC: 106 MG/DL (ref 65–99)
GLUCOSE BLDC GLUCOMTR-MCNC: 114 MG/DL (ref 70–130)
GLUCOSE BLDC GLUCOMTR-MCNC: 148 MG/DL (ref 70–130)
GLUCOSE BLDC GLUCOMTR-MCNC: 180 MG/DL (ref 70–130)
GLUCOSE BLDC GLUCOMTR-MCNC: 99 MG/DL (ref 70–130)
MAGNESIUM SERPL-MCNC: 1.7 MG/DL (ref 1.6–2.4)
PHOSPHATE SERPL-MCNC: 2.4 MG/DL (ref 2.5–4.5)
POTASSIUM BLD-SCNC: 3.2 MMOL/L (ref 3.5–5.2)
PROT SERPL-MCNC: 6 G/DL (ref 6–8.5)
SODIUM BLD-SCNC: 140 MMOL/L (ref 136–145)

## 2020-05-17 PROCEDURE — 94799 UNLISTED PULMONARY SVC/PX: CPT

## 2020-05-17 PROCEDURE — 85730 THROMBOPLASTIN TIME PARTIAL: CPT | Performed by: HOSPITALIST

## 2020-05-17 PROCEDURE — 80053 COMPREHEN METABOLIC PANEL: CPT | Performed by: INTERNAL MEDICINE

## 2020-05-17 PROCEDURE — 84100 ASSAY OF PHOSPHORUS: CPT | Performed by: INTERNAL MEDICINE

## 2020-05-17 PROCEDURE — 25010000002 PIPERACILLIN SOD-TAZOBACTAM PER 1 G: Performed by: SURGERY

## 2020-05-17 PROCEDURE — 99024 POSTOP FOLLOW-UP VISIT: CPT | Performed by: SURGERY

## 2020-05-17 PROCEDURE — 83735 ASSAY OF MAGNESIUM: CPT | Performed by: INTERNAL MEDICINE

## 2020-05-17 PROCEDURE — 25010000002 HEPARIN (PORCINE) PER 1000 UNITS: Performed by: INTERNAL MEDICINE

## 2020-05-17 PROCEDURE — 82962 GLUCOSE BLOOD TEST: CPT

## 2020-05-17 PROCEDURE — 25010000002 MORPHINE PER 10 MG: Performed by: SURGERY

## 2020-05-17 PROCEDURE — 86140 C-REACTIVE PROTEIN: CPT | Performed by: SURGERY

## 2020-05-17 PROCEDURE — 99232 SBSQ HOSP IP/OBS MODERATE 35: CPT | Performed by: INTERNAL MEDICINE

## 2020-05-17 PROCEDURE — 85730 THROMBOPLASTIN TIME PARTIAL: CPT | Performed by: INTERNAL MEDICINE

## 2020-05-17 PROCEDURE — 25010000002 PIPERACILLIN SOD-TAZOBACTAM PER 1 G: Performed by: INTERNAL MEDICINE

## 2020-05-17 PROCEDURE — 25010000002 MAGNESIUM SULFATE 2 GM/50ML SOLUTION: Performed by: INTERNAL MEDICINE

## 2020-05-17 RX ORDER — MAGNESIUM SULFATE HEPTAHYDRATE 40 MG/ML
2 INJECTION, SOLUTION INTRAVENOUS ONCE
Status: COMPLETED | OUTPATIENT
Start: 2020-05-17 | End: 2020-05-17

## 2020-05-17 RX ORDER — POTASSIUM CHLORIDE 1.5 G/1.77G
40 POWDER, FOR SOLUTION ORAL AS NEEDED
Status: DISCONTINUED | OUTPATIENT
Start: 2020-05-17 | End: 2020-05-19 | Stop reason: HOSPADM

## 2020-05-17 RX ORDER — POTASSIUM CHLORIDE 20 MEQ/1
40 TABLET, EXTENDED RELEASE ORAL EVERY 4 HOURS
Status: COMPLETED | OUTPATIENT
Start: 2020-05-17 | End: 2020-05-17

## 2020-05-17 RX ORDER — POTASSIUM CHLORIDE 20 MEQ/1
40 TABLET, EXTENDED RELEASE ORAL AS NEEDED
Status: DISCONTINUED | OUTPATIENT
Start: 2020-05-17 | End: 2020-05-19 | Stop reason: HOSPADM

## 2020-05-17 RX ORDER — POTASSIUM CHLORIDE 7.45 MG/ML
10 INJECTION INTRAVENOUS
Status: DISCONTINUED | OUTPATIENT
Start: 2020-05-17 | End: 2020-05-19 | Stop reason: HOSPADM

## 2020-05-17 RX ADMIN — SODIUM CHLORIDE, PRESERVATIVE FREE 10 ML: 5 INJECTION INTRAVENOUS at 10:00

## 2020-05-17 RX ADMIN — POTASSIUM PHOSPHATE, MONOBASIC AND POTASSIUM PHOSPHATE, DIBASIC 15 MMOL: 224; 236 INJECTION, SOLUTION, CONCENTRATE INTRAVENOUS at 12:50

## 2020-05-17 RX ADMIN — MORPHINE SULFATE 1 MG: 2 INJECTION, SOLUTION INTRAMUSCULAR; INTRAVENOUS at 08:44

## 2020-05-17 RX ADMIN — MAGNESIUM SULFATE 2 G: 2 INJECTION INTRAVENOUS at 12:50

## 2020-05-17 RX ADMIN — POTASSIUM CHLORIDE 40 MEQ: 20 TABLET, EXTENDED RELEASE ORAL at 12:50

## 2020-05-17 RX ADMIN — SODIUM CHLORIDE 75 ML/HR: 9 INJECTION, SOLUTION INTRAVENOUS at 02:40

## 2020-05-17 RX ADMIN — PIPERACILLIN AND TAZOBACTAM 3.38 G: 3; .375 INJECTION, POWDER, FOR SOLUTION INTRAVENOUS at 15:09

## 2020-05-17 RX ADMIN — HEPARIN SODIUM 18.8 UNITS/KG/HR: 10000 INJECTION, SOLUTION INTRAVENOUS at 19:34

## 2020-05-17 RX ADMIN — MORPHINE SULFATE 2 MG: 2 INJECTION, SOLUTION INTRAMUSCULAR; INTRAVENOUS at 20:08

## 2020-05-17 RX ADMIN — IPRATROPIUM BROMIDE AND ALBUTEROL SULFATE 3 ML: .5; 3 SOLUTION RESPIRATORY (INHALATION) at 13:08

## 2020-05-17 RX ADMIN — MORPHINE SULFATE 2 MG: 2 INJECTION, SOLUTION INTRAMUSCULAR; INTRAVENOUS at 03:39

## 2020-05-17 RX ADMIN — SODIUM CHLORIDE, PRESERVATIVE FREE 10 ML: 5 INJECTION INTRAVENOUS at 20:09

## 2020-05-17 RX ADMIN — POTASSIUM CHLORIDE 40 MEQ: 20 TABLET, EXTENDED RELEASE ORAL at 15:26

## 2020-05-17 RX ADMIN — PIPERACILLIN AND TAZOBACTAM 3.38 G: 3; .375 INJECTION, POWDER, FOR SOLUTION INTRAVENOUS at 20:08

## 2020-05-17 RX ADMIN — IPRATROPIUM BROMIDE AND ALBUTEROL SULFATE 3 ML: .5; 3 SOLUTION RESPIRATORY (INHALATION) at 07:34

## 2020-05-17 RX ADMIN — BUDESONIDE AND FORMOTEROL FUMARATE DIHYDRATE 2 PUFF: 80; 4.5 AEROSOL RESPIRATORY (INHALATION) at 19:28

## 2020-05-17 RX ADMIN — BUDESONIDE AND FORMOTEROL FUMARATE DIHYDRATE 2 PUFF: 80; 4.5 AEROSOL RESPIRATORY (INHALATION) at 07:32

## 2020-05-17 RX ADMIN — PIPERACILLIN SODIUM AND TAZOBACTAM SODIUM 3.38 G: 3; .375 INJECTION, POWDER, LYOPHILIZED, FOR SOLUTION INTRAVENOUS at 05:25

## 2020-05-17 RX ADMIN — IPRATROPIUM BROMIDE AND ALBUTEROL SULFATE 3 ML: .5; 3 SOLUTION RESPIRATORY (INHALATION) at 19:28

## 2020-05-17 RX ADMIN — METOPROLOL TARTRATE 12.5 MG: 25 TABLET, FILM COATED ORAL at 20:08

## 2020-05-17 RX ADMIN — METOPROLOL TARTRATE 25 MG: 25 TABLET, FILM COATED ORAL at 08:32

## 2020-05-17 RX ADMIN — PANTOPRAZOLE SODIUM 40 MG: 40 TABLET, DELAYED RELEASE ORAL at 05:25

## 2020-05-17 NOTE — PROGRESS NOTES
"Assisted By: Luisito RAMIREZ    CC: Follow-up on incarcerated hernia    Interview History/HPI: Patient states she is still \"sore\" in her abdomen about the same intensity as yesterday.  She is status post surgery for incarcerated piece of bowel into the hernia.  She states she is still having flatus but has not had a bowel movement as of yet but is tolerating her liquids.  No emesis.  She states she is still coughing with some minimal white phlegm.  No fever overnight.  No chest pain.      Vitals:    05/17/20 1028   BP: 143/73   Pulse: 93   Resp: 18   Temp: 98.4 °F (36.9 °C)   SpO2: 97%         Intake/Output Summary (Last 24 hours) at 5/17/2020 1127  Last data filed at 5/17/2020 0526  Gross per 24 hour   Intake 2725.96 ml   Output 900 ml   Net 1825.96 ml       EXAM: Patient is awake and alert, she is on 2 L with a saturation 96%.  Less coughing noted today, she is in no distress, she can speak in full sentences without difficulty lungs have bilateral breath sounds with some diminished sounds without crackles at the right base posterior, anteriorly lungs are clear, heart is a regular rate and rhythm without murmur rub or gallop.    Tele: sinus, occ pvc, occasional bernadine    LABS:   Lab Results (last 48 hours)     Procedure Component Value Units Date/Time    POC Glucose Once [[id number]]  (Abnormal) Collected:  05/17/20 1030    Specimen:  Blood Updated:  05/17/20 1054     Glucose 148 mg/dL     aPTT [[id number]]  (Abnormal) Collected:  05/17/20 0943    Specimen:  Blood Updated:  05/17/20 1015     PTT 82.8 seconds     Narrative:       PTT Heparin Therapeutic Range:  59 - 95 seconds      POC Glucose Once [[id number]]  (Normal) Collected:  05/17/20 0607    Specimen:  Blood Updated:  05/17/20 0635     Glucose 99 mg/dL     Comprehensive Metabolic Panel [[id number]]  (Abnormal) Collected:  05/17/20 0112    Specimen:  Blood Updated:  05/17/20 0234     Glucose 106 mg/dL      BUN 6 mg/dL      Creatinine 0.71 mg/dL      Sodium 140 mmol/L     "  Potassium 3.2 mmol/L      Chloride 106 mmol/L      CO2 22.5 mmol/L      Calcium 8.2 mg/dL      Total Protein 6.0 g/dL      Albumin 3.12 g/dL      ALT (SGPT) 13 U/L      AST (SGOT) 13 U/L      Alkaline Phosphatase 79 U/L      Total Bilirubin 0.3 mg/dL      eGFR Non African Amer 81 mL/min/1.73      Globulin 2.9 gm/dL      A/G Ratio 1.1 g/dL      BUN/Creatinine Ratio 8.5     Anion Gap 11.5 mmol/L     Narrative:       GFR Normal >60  Chronic Kidney Disease <60  Kidney Failure <15      Phosphorus [[id number]]  (Abnormal) Collected:  05/17/20 0112    Specimen:  Blood Updated:  05/17/20 0234     Phosphorus 2.4 mg/dL     Magnesium [[id number]]  (Normal) Collected:  05/17/20 0112    Specimen:  Blood Updated:  05/17/20 0234     Magnesium 1.7 mg/dL     C-reactive Protein [[id number]]  (Abnormal) Collected:  05/17/20 0112    Specimen:  Blood Updated:  05/17/20 0234     C-Reactive Protein 10.61 mg/dL     aPTT [[id number]]  (Abnormal) Collected:  05/17/20 0117    Specimen:  Blood Updated:  05/17/20 0224     PTT >100.0 seconds     Narrative:       PTT Heparin Therapeutic Range:  59 - 95 seconds      Blood Culture - Blood, Arm, Left [[id number]] Collected:  05/12/20 2330    Specimen:  Blood from Arm, Left Updated:  05/16/20 2345     Blood Culture No growth at 4 days    Blood Culture - Blood, Arm, Right [[id number]] Collected:  05/12/20 2317    Specimen:  Blood from Arm, Right Updated:  05/16/20 2330     Blood Culture No growth at 4 days    POC Glucose Once [[id number]]  (Abnormal) Collected:  05/16/20 1818    Specimen:  Blood Updated:  05/16/20 1850     Glucose 134 mg/dL     aPTT [[id number]]  (Abnormal) Collected:  05/16/20 1742    Specimen:  Blood Updated:  05/16/20 1834     PTT 53.5 seconds     Narrative:       PTT Heparin Therapeutic Range:  59 - 95 seconds      POC Glucose Once [[id number]]  (Normal) Collected:  05/16/20 1614    Specimen:  Blood Updated:  05/16/20 1621     Glucose 115 mg/dL     C-reactive Protein [[id number]]   (Abnormal) Collected:  05/16/20 0248    Specimen:  Blood Updated:  05/16/20 1311     C-Reactive Protein 5.63 mg/dL     POC Glucose Once [[id number]]  (Normal) Collected:  05/16/20 1026    Specimen:  Blood Updated:  05/16/20 1054     Glucose 109 mg/dL     aPTT [[id number]]  (Abnormal) Collected:  05/16/20 1004    Specimen:  Blood Updated:  05/16/20 1040     PTT 36.5 seconds     Narrative:       PTT Heparin Therapeutic Range:  59 - 95 seconds      POC Glucose Once [[id number]]  (Normal) Collected:  05/16/20 0613    Specimen:  Blood Updated:  05/16/20 0629     Glucose 122 mg/dL     Phosphorus [[id number]]  (Abnormal) Collected:  05/16/20 0248    Specimen:  Blood Updated:  05/16/20 0330     Phosphorus 2.1 mg/dL     aPTT [[id number]]  (Abnormal) Collected:  05/16/20 0248    Specimen:  Blood Updated:  05/16/20 0321     PTT 43.9 seconds     Narrative:       PTT Heparin Therapeutic Range:  59 - 95 seconds      Comprehensive Metabolic Panel [[id number]]  (Abnormal) Collected:  05/16/20 0248    Specimen:  Blood Updated:  05/16/20 0315     Glucose 107 mg/dL      BUN 6 mg/dL      Creatinine 0.78 mg/dL      Sodium 140 mmol/L      Potassium 3.6 mmol/L      Chloride 108 mmol/L      CO2 22.3 mmol/L      Calcium 8.1 mg/dL      Total Protein 6.0 g/dL      Albumin 3.12 g/dL      ALT (SGPT) 14 U/L      AST (SGOT) 13 U/L      Alkaline Phosphatase 79 U/L      Total Bilirubin 0.3 mg/dL      eGFR Non African Amer 72 mL/min/1.73      Globulin 2.9 gm/dL      A/G Ratio 1.1 g/dL      BUN/Creatinine Ratio 7.7     Anion Gap 9.7 mmol/L     Narrative:       GFR Normal >60  Chronic Kidney Disease <60  Kidney Failure <15      Magnesium [[id number]]  (Normal) Collected:  05/16/20 0248    Specimen:  Blood Updated:  05/16/20 0315     Magnesium 1.8 mg/dL     CBC & Differential [[id number]] Collected:  05/16/20 0248    Specimen:  Blood Updated:  05/16/20 0256    Narrative:       The following orders were created for panel order CBC & Differential.  Procedure                                Abnormality         Status                     ---------                               -----------         ------                     CBC Auto Differential[[id number]]        Abnormal            Final result                 Please view results for these tests on the individual orders.    CBC Auto Differential [[id number]]  (Abnormal) Collected:  05/16/20 0248    Specimen:  Blood Updated:  05/16/20 0256     WBC 8.34 10*3/mm3      RBC 3.88 10*6/mm3      Hemoglobin 10.6 g/dL      Hematocrit 35.0 %      MCV 90.2 fL      MCH 27.3 pg      MCHC 30.3 g/dL      RDW 13.6 %      RDW-SD 44.6 fl      MPV 11.1 fL      Platelets 175 10*3/mm3      Neutrophil % 67.0 %      Lymphocyte % 17.7 %      Monocyte % 9.7 %      Eosinophil % 4.3 %      Basophil % 0.5 %      Immature Grans % 0.8 %      Neutrophils, Absolute 5.58 10*3/mm3      Lymphocytes, Absolute 1.48 10*3/mm3      Monocytes, Absolute 0.81 10*3/mm3      Eosinophils, Absolute 0.36 10*3/mm3      Basophils, Absolute 0.04 10*3/mm3      Immature Grans, Absolute 0.07 10*3/mm3      nRBC 0.0 /100 WBC     POC Glucose Once [[id number]]  (Abnormal) Collected:  05/15/20 1926    Specimen:  Blood Updated:  05/15/20 1935     Glucose 148 mg/dL     POC Glucose Once [[id number]]  (Normal) Collected:  05/15/20 1603    Specimen:  Blood Updated:  05/15/20 1613     Glucose 122 mg/dL     Protime-INR [[id number]]  (Normal) Collected:  05/15/20 1354    Specimen:  Blood Updated:  05/15/20 1421     Protime 13.7 Seconds      INR 1.00    Narrative:       Suggested INR therapeutic range for stable oral anticoagulant therapy:    Low Intensity therapy:   1.5-2.0  Moderate Intensity therapy:   2.0-3.0  High Intensity therapy:   2.5-4.0    aPTT [[id number]]  (Normal) Collected:  05/15/20 1354    Specimen:  Blood Updated:  05/15/20 1421     PTT 23.8 seconds     Narrative:       PTT Heparin Therapeutic Range:  59 - 95 seconds      CBC & Differential [[id number]] Collected:  05/15/20 1353     Specimen:  Blood Updated:  05/15/20 1413    Narrative:       The following orders were created for panel order CBC & Differential.  Procedure                               Abnormality         Status                     ---------                               -----------         ------                     CBC Auto Differential[[id number]]        Abnormal            Final result                 Please view results for these tests on the individual orders.    CBC Auto Differential [[id number]]  (Abnormal) Collected:  05/15/20 1354    Specimen:  Blood Updated:  05/15/20 1413     WBC 9.32 10*3/mm3      RBC 3.97 10*6/mm3      Hemoglobin 10.8 g/dL      Hematocrit 35.9 %      MCV 90.4 fL      MCH 27.2 pg      MCHC 30.1 g/dL      RDW 13.6 %      RDW-SD 45.2 fl      MPV 11.4 fL      Platelets 181 10*3/mm3      Neutrophil % 71.4 %      Lymphocyte % 13.8 %      Monocyte % 10.4 %      Eosinophil % 2.8 %      Basophil % 0.6 %      Immature Grans % 1.0 %      Neutrophils, Absolute 6.65 10*3/mm3      Lymphocytes, Absolute 1.29 10*3/mm3      Monocytes, Absolute 0.97 10*3/mm3      Eosinophils, Absolute 0.26 10*3/mm3      Basophils, Absolute 0.06 10*3/mm3      Immature Grans, Absolute 0.09 10*3/mm3      nRBC 0.0 /100 WBC           Radiology:    Imaging Results (Last 72 Hours)     Procedure Component Value Units Date/Time    XR Chest AP [[id number]] Collected:  05/16/20 1209     Updated:  05/16/20 1218    Narrative:       XR CHEST AP-     CLINICAL INDICATION: cough; K56.609-Unspecified intestinal obstruction,  unspecified as to partial versus complete obstruction        COMPARISON: 5/13/2020 and 12/18/2019      TECHNIQUE: Single frontal view of the chest.     FINDINGS:     Trace bibasilar effusions  Appearance suggestive of congestive heart failure  There is no evidence of an acute osseous abnormality.   There are no suspicious-appearing parenchymal soft tissue nodules.          Impression:       Trace bibasilar effusions and right  basilar consolidation  CHF     This report was finalized on 5/16/2020 12:10 PM by Dr. Mauricio Santiago MD.             Results for orders placed during the hospital encounter of 01/25/19   Adult Transthoracic Echo Complete W/ Cont if Necessary Per Protocol    Narrative · A trivial pericardial effusion noted. Adjacent to the right ventricle.  · Right ventricular cavity is borderline dilated.  · Estimated EF = 60%.  · Left ventricular systolic function is normal.  · No prev echo      DF/W Dr Bonner    Assessment/Plan:   Status post incarcerated bowel surgery, she has good bowel sounds,  but having flatus no bowel movement yet, will leave diet advancement to general surgery, I am going to hold on transitioning her to Eliquis until her bowel function fully returns.    A. fib, paroxysmal, some bradycardia by telemetry at times, I have decreased her Lopressor, otherwise on heparin for stroke prevention, can transition to Eliquis when bowel function fully returns.    Hypokalemia, being supplemented    Hypophosphatemia, being supplemented    Hypomagnesemia, relative, being supplemented    Since she is tolerating fluids I am hep locking her IV, she will still be getting the replacements however above    Right atelectasis and possible fluid, doubt pneumonia but I have extended her Zosyn therapy for now, encouraged her on incentive spirometer, continue DuoNeb.

## 2020-05-17 NOTE — PROGRESS NOTES
LOS: 5 days   Patient Care Team:  Niki Antony APRN as PCP - General (Nurse Practitioner)  Jonathan Del Valle MD as Consulting Physician (Endocrinology)    Post op day # 3, status post ventral hernia repair    Subjective     Interval History:   Patient states she is still sore. Flatus +, BM -.  Giving herself up out of bed to bedside commode.  She states she ate every bit of her full liquids and feels that she is ready for food.  History taken from patient.      Objective     Vital Signs  Temp:  [97.6 °F (36.4 °C)-98.4 °F (36.9 °C)] 98.4 °F (36.9 °C)  Heart Rate:  [] 90  Resp:  [18] 18  BP: (134-143)/() 143/73    Physical Exam:  This is a well-developed well-nourished obese female in no acute distress  HEENT examination: Sclera are anicteric  Lungs: Clear  Abdomen: Bowel sounds are present and active.  Patient reports tenderness to palpation in all quadrants.  To my examination tenderness is less than yesterday.  Skin/incisions: Incision sites were inspected and demonstrate no drainage or erythema     Results Review:    Results from last 7 days   Lab Units 05/12/20 2001   TROPONIN T ng/mL <0.010     Results from last 7 days   Lab Units 05/17/20  0112 05/16/20  0248 05/15/20  1354 05/15/20  0404 05/14/20  0445  05/12/20  2317 05/12/20 2001   CRP mg/dL 10.61* 5.63*  --   --   --   --   --  0.27   LACTATE mmol/L  --   --   --   --   --   --  0.7  --    WBC 10*3/mm3  --  8.34 9.32 8.88 6.48   < >  --  6.77   HEMOGLOBIN g/dL  --  10.6* 10.8* 11.3* 11.4*   < >  --  13.2   HEMATOCRIT %  --  35.0 35.9 36.7 38.1   < >  --  41.6   PLATELETS 10*3/mm3  --  175 181 188 189   < >  --  217   INR   --   --  1.00  --  0.99  --   --   --     < > = values in this interval not displayed.         Results from last 7 days   Lab Units 05/17/20  0112 05/16/20  0248 05/15/20  0404   SODIUM mmol/L 140 140 144   POTASSIUM mmol/L 3.2* 3.6 3.9   MAGNESIUM mg/dL 1.7 1.8 1.8   CHLORIDE mmol/L 106 108* 110*   CO2 mmol/L 22.5  22.3 22.0   BUN mg/dL 6* 6* 7*   CREATININE mg/dL 0.71 0.78 0.76   EGFR IF NONAFRICN AM mL/min/1.73 81 72 75   CALCIUM mg/dL 8.2* 8.1* 7.9*   GLUCOSE mg/dL 106* 107* 88   ALBUMIN g/dL 3.12* 3.12* 3.29*   BILIRUBIN mg/dL 0.3 0.3 0.2   ALK PHOS U/L 79 79 86   AST (SGOT) U/L 13 13 15   ALT (SGPT) U/L 13 14 18   Estimated Creatinine Clearance: 78.3 mL/min (by C-G formula based on SCr of 0.71 mg/dL).  No results found for: AMMONIA      Blood Culture   Date Value Ref Range Status   05/12/2020 No growth at 3 days  Preliminary   05/12/2020 No growth at 3 days  Preliminary     Urine Culture   Date Value Ref Range Status   05/12/2020 25,000 CFU/mL Mixed Yareli Isolated  Final     No results found for: WOUNDCX  No results found for: STOOLCX    Imaging:  Imaging Results (Last 24 Hours)     ** No results found for the last 24 hours. **           Impression:  S/p incisional hernia repair.  Given active level of bowel sounds index of suspicion for underlying bowel injury is low    Plan:  Advance to diabetic diet    Gayla Bonner MD  05/17/20  14:08      Please note that portions of this note were completed with a voice recognition program.

## 2020-05-17 NOTE — PLAN OF CARE
Patient had some complaints of pain in her abdomen tonight, received pain meds as ordered. No other complaints, resting, will continue to monitor .

## 2020-05-17 NOTE — PLAN OF CARE
PT alert and cooperative to care. PT has not had a BM but is actively passing flautus. PT to have diet advanced for dinner and monitored. PT up undependably to bedside commode.

## 2020-05-18 LAB
ALBUMIN SERPL-MCNC: 3.15 G/DL (ref 3.5–5.2)
ALBUMIN/GLOB SERPL: 0.9 G/DL
ALP SERPL-CCNC: 83 U/L (ref 39–117)
ALT SERPL W P-5'-P-CCNC: 12 U/L (ref 1–33)
ANION GAP SERPL CALCULATED.3IONS-SCNC: 13.3 MMOL/L (ref 5–15)
APTT PPP: 58.2 SECONDS (ref 23.8–36.1)
APTT PPP: 68.1 SECONDS (ref 23.8–36.1)
APTT PPP: 87.6 SECONDS (ref 23.8–36.1)
AST SERPL-CCNC: 14 U/L (ref 1–32)
BASOPHILS # BLD AUTO: 0.04 10*3/MM3 (ref 0–0.2)
BASOPHILS NFR BLD AUTO: 0.5 % (ref 0–1.5)
BILIRUB SERPL-MCNC: 0.3 MG/DL (ref 0.2–1.2)
BUN BLD-MCNC: 9 MG/DL (ref 8–23)
BUN/CREAT SERPL: 9.4 (ref 7–25)
CALCIUM SPEC-SCNC: 9.2 MG/DL (ref 8.6–10.5)
CHLORIDE SERPL-SCNC: 106 MMOL/L (ref 98–107)
CO2 SERPL-SCNC: 20.7 MMOL/L (ref 22–29)
CREAT BLD-MCNC: 0.96 MG/DL (ref 0.57–1)
CRP SERPL-MCNC: 10.9 MG/DL (ref 0–0.5)
DEPRECATED RDW RBC AUTO: 44.2 FL (ref 37–54)
EOSINOPHIL # BLD AUTO: 0.47 10*3/MM3 (ref 0–0.4)
EOSINOPHIL NFR BLD AUTO: 6.4 % (ref 0.3–6.2)
ERYTHROCYTE [DISTWIDTH] IN BLOOD BY AUTOMATED COUNT: 13.6 % (ref 12.3–15.4)
GFR SERPL CREATININE-BSD FRML MDRD: 57 ML/MIN/1.73
GLOBULIN UR ELPH-MCNC: 3.5 GM/DL
GLUCOSE BLD-MCNC: 140 MG/DL (ref 65–99)
GLUCOSE BLDC GLUCOMTR-MCNC: 109 MG/DL (ref 70–130)
GLUCOSE BLDC GLUCOMTR-MCNC: 113 MG/DL (ref 70–130)
GLUCOSE BLDC GLUCOMTR-MCNC: 179 MG/DL (ref 70–130)
GLUCOSE BLDC GLUCOMTR-MCNC: 187 MG/DL (ref 70–130)
HCT VFR BLD AUTO: 33.2 % (ref 34–46.6)
HGB BLD-MCNC: 10.2 G/DL (ref 12–15.9)
IMM GRANULOCYTES # BLD AUTO: 0.1 10*3/MM3 (ref 0–0.05)
IMM GRANULOCYTES NFR BLD AUTO: 1.4 % (ref 0–0.5)
LYMPHOCYTES # BLD AUTO: 1.13 10*3/MM3 (ref 0.7–3.1)
LYMPHOCYTES NFR BLD AUTO: 15.5 % (ref 19.6–45.3)
MAGNESIUM SERPL-MCNC: 2.3 MG/DL (ref 1.6–2.4)
MCH RBC QN AUTO: 27.1 PG (ref 26.6–33)
MCHC RBC AUTO-ENTMCNC: 30.7 G/DL (ref 31.5–35.7)
MCV RBC AUTO: 88.3 FL (ref 79–97)
MONOCYTES # BLD AUTO: 0.9 10*3/MM3 (ref 0.1–0.9)
MONOCYTES NFR BLD AUTO: 12.3 % (ref 5–12)
NEUTROPHILS # BLD AUTO: 4.66 10*3/MM3 (ref 1.7–7)
NEUTROPHILS NFR BLD AUTO: 63.9 % (ref 42.7–76)
NRBC BLD AUTO-RTO: 0 /100 WBC (ref 0–0.2)
PHOSPHATE SERPL-MCNC: 3.5 MG/DL (ref 2.5–4.5)
PLATELET # BLD AUTO: 213 10*3/MM3 (ref 140–450)
PMV BLD AUTO: 11.6 FL (ref 6–12)
POTASSIUM BLD-SCNC: 4.2 MMOL/L (ref 3.5–5.2)
PROT SERPL-MCNC: 6.6 G/DL (ref 6–8.5)
RBC # BLD AUTO: 3.76 10*6/MM3 (ref 3.77–5.28)
SODIUM BLD-SCNC: 140 MMOL/L (ref 136–145)
WBC NRBC COR # BLD: 7.3 10*3/MM3 (ref 3.4–10.8)

## 2020-05-18 PROCEDURE — 99024 POSTOP FOLLOW-UP VISIT: CPT | Performed by: SURGERY

## 2020-05-18 PROCEDURE — 85730 THROMBOPLASTIN TIME PARTIAL: CPT | Performed by: INTERNAL MEDICINE

## 2020-05-18 PROCEDURE — 86140 C-REACTIVE PROTEIN: CPT | Performed by: INTERNAL MEDICINE

## 2020-05-18 PROCEDURE — 84100 ASSAY OF PHOSPHORUS: CPT | Performed by: INTERNAL MEDICINE

## 2020-05-18 PROCEDURE — 25010000002 PIPERACILLIN SOD-TAZOBACTAM PER 1 G: Performed by: INTERNAL MEDICINE

## 2020-05-18 PROCEDURE — 82962 GLUCOSE BLOOD TEST: CPT

## 2020-05-18 PROCEDURE — 94799 UNLISTED PULMONARY SVC/PX: CPT

## 2020-05-18 PROCEDURE — 85730 THROMBOPLASTIN TIME PARTIAL: CPT | Performed by: HOSPITALIST

## 2020-05-18 PROCEDURE — 80053 COMPREHEN METABOLIC PANEL: CPT | Performed by: INTERNAL MEDICINE

## 2020-05-18 PROCEDURE — 85025 COMPLETE CBC W/AUTO DIFF WBC: CPT | Performed by: INTERNAL MEDICINE

## 2020-05-18 PROCEDURE — 25010000002 MORPHINE PER 10 MG: Performed by: SURGERY

## 2020-05-18 PROCEDURE — 83735 ASSAY OF MAGNESIUM: CPT | Performed by: INTERNAL MEDICINE

## 2020-05-18 PROCEDURE — 25010000002 HEPARIN (PORCINE) PER 1000 UNITS: Performed by: INTERNAL MEDICINE

## 2020-05-18 PROCEDURE — 99232 SBSQ HOSP IP/OBS MODERATE 35: CPT | Performed by: HOSPITALIST

## 2020-05-18 RX ORDER — LACTULOSE 10 G/15ML
30 SOLUTION ORAL DAILY
Status: COMPLETED | OUTPATIENT
Start: 2020-05-18 | End: 2020-05-18

## 2020-05-18 RX ORDER — BISACODYL 10 MG
10 SUPPOSITORY, RECTAL RECTAL ONCE
Status: COMPLETED | OUTPATIENT
Start: 2020-05-18 | End: 2020-05-18

## 2020-05-18 RX ADMIN — SODIUM CHLORIDE, PRESERVATIVE FREE 10 ML: 5 INJECTION INTRAVENOUS at 07:27

## 2020-05-18 RX ADMIN — IPRATROPIUM BROMIDE AND ALBUTEROL SULFATE 3 ML: .5; 3 SOLUTION RESPIRATORY (INHALATION) at 13:33

## 2020-05-18 RX ADMIN — IPRATROPIUM BROMIDE AND ALBUTEROL SULFATE 3 ML: .5; 3 SOLUTION RESPIRATORY (INHALATION) at 18:55

## 2020-05-18 RX ADMIN — PANTOPRAZOLE SODIUM 40 MG: 40 TABLET, DELAYED RELEASE ORAL at 05:04

## 2020-05-18 RX ADMIN — HEPARIN SODIUM 16.8 UNITS/KG/HR: 10000 INJECTION, SOLUTION INTRAVENOUS at 23:55

## 2020-05-18 RX ADMIN — METOPROLOL TARTRATE 12.5 MG: 25 TABLET, FILM COATED ORAL at 07:27

## 2020-05-18 RX ADMIN — PIPERACILLIN AND TAZOBACTAM 3.38 G: 3; .375 INJECTION, POWDER, FOR SOLUTION INTRAVENOUS at 15:57

## 2020-05-18 RX ADMIN — IPRATROPIUM BROMIDE AND ALBUTEROL SULFATE 3 ML: .5; 3 SOLUTION RESPIRATORY (INHALATION) at 06:55

## 2020-05-18 RX ADMIN — PIPERACILLIN AND TAZOBACTAM 3.38 G: 3; .375 INJECTION, POWDER, FOR SOLUTION INTRAVENOUS at 05:04

## 2020-05-18 RX ADMIN — MORPHINE SULFATE 2 MG: 2 INJECTION, SOLUTION INTRAMUSCULAR; INTRAVENOUS at 00:08

## 2020-05-18 RX ADMIN — BUDESONIDE AND FORMOTEROL FUMARATE DIHYDRATE 2 PUFF: 80; 4.5 AEROSOL RESPIRATORY (INHALATION) at 18:55

## 2020-05-18 RX ADMIN — BUDESONIDE AND FORMOTEROL FUMARATE DIHYDRATE 2 PUFF: 80; 4.5 AEROSOL RESPIRATORY (INHALATION) at 06:55

## 2020-05-18 RX ADMIN — BISACODYL 10 MG: 10 SUPPOSITORY RECTAL at 15:57

## 2020-05-18 RX ADMIN — PIPERACILLIN AND TAZOBACTAM 3.38 G: 3; .375 INJECTION, POWDER, FOR SOLUTION INTRAVENOUS at 21:19

## 2020-05-18 RX ADMIN — MORPHINE SULFATE 2 MG: 2 INJECTION, SOLUTION INTRAMUSCULAR; INTRAVENOUS at 23:59

## 2020-05-18 RX ADMIN — LACTULOSE 30 G: 10 SOLUTION ORAL at 15:57

## 2020-05-18 NOTE — PROGRESS NOTES
CC: incarcerated ventral hernia with SBO    POD 4: ventral hernia repair    Awaiting BM.  Somewhat distended but tolerating diet.  +flatus.  Ambulating to bedside commode.    AFVSS  RRR  CTAB  S/appropriately tender/incisions c/d/i, +BS, slight distension    73 yo F s/p ventral hernia repair with mesh.  -continue diet  -ambulate  -awaiting BM and more decompression with flatus.

## 2020-05-18 NOTE — PLAN OF CARE
Problem: Patient Care Overview  Goal: Plan of Care Review  Outcome: Ongoing (interventions implemented as appropriate)  Goal: Individualization and Mutuality  Outcome: Ongoing (interventions implemented as appropriate)  Goal: Discharge Needs Assessment  Outcome: Ongoing (interventions implemented as appropriate)  Goal: Interprofessional Rounds/Family Conf  Outcome: Ongoing (interventions implemented as appropriate)     Problem: Skin Injury Risk (Adult)  Goal: Identify Related Risk Factors and Signs and Symptoms  Outcome: Ongoing (interventions implemented as appropriate)  Goal: Skin Health and Integrity  Outcome: Ongoing (interventions implemented as appropriate)     Problem: Diabetes, Type 2 (Adult)  Goal: Signs and Symptoms of Listed Potential Problems Will be Absent, Minimized or Managed (Diabetes, Type 2)  Outcome: Ongoing (interventions implemented as appropriate)     Problem: Bowel Obstruction (Adult)  Goal: Signs and Symptoms of Listed Potential Problems Will be Absent, Minimized or Managed (Bowel Obstruction)  Outcome: Ongoing (interventions implemented as appropriate)     Problem: Fall Risk (Adult)  Goal: Identify Related Risk Factors and Signs and Symptoms  Outcome: Ongoing (interventions implemented as appropriate)  Goal: Absence of Fall  Outcome: Ongoing (interventions implemented as appropriate)

## 2020-05-18 NOTE — PLAN OF CARE
PT alert and cooperative to care. Pain expressed to abdomen with palpation. No BM noted but PT passing flautus. BM protocol initiated.

## 2020-05-18 NOTE — PROGRESS NOTES
[facility] HOSPITALIST PROGRESS NOTE     Patient Identification:  Name:  Mayra Hays  Age:  74 y.o.  Sex:  female  :  1945  MRN:  [id number]  Visit Number:  [id number]  Primary Care Provider:  Niki Antony APRN    Length of stay:  6    Subjective: Patient seen and examined, assisted by Luisito Jesus RN.  Patient is currently eating regular diet, progressed by Dr. Velásquez.  Patient has no nausea or vomiting, still no bowel movement, reports mild tenderness on the surgical site of the abdomen.  Afebrile.  Still on heparin drip.    Chief Complaint: Abdominal pain, constipation  ----------------------------------------------------------------------------------------------------------------------  Current Hospital Meds:    bisacodyl 10 mg Rectal Once   budesonide-formoterol 2 puff Inhalation BID - RT   ipratropium-albuterol 3 mL Nebulization 4x Daily - RT   lactulose 30 g Oral Daily   metoprolol tartrate 12.5 mg Oral Q12H   pantoprazole 40 mg Oral Q AM   piperacillin-tazobactam 3.375 g Intravenous Q8H   sodium chloride 10 mL Intravenous Q12H       heparin (porcine) 9.8 Units/kg/hr Last Rate: 16.794 Units/kg/hr (20 1018)     ----------------------------------------------------------------------------------------------------------------------  Vital Signs:  Temp:  [98.2 °F (36.8 °C)-98.5 °F (36.9 °C)] 98.5 °F (36.9 °C)  Heart Rate:  [] 78  Resp:  [18-20] 18  BP: (105-143)/(60-86) 123/76       Tele: A. fib rate of 76 bpm      20  0520 20  0526 20  0500   Weight: 107 kg (235 lb) 109 kg (239 lb 6.4 oz) 109 kg (240 lb 3.2 oz)     Body mass index is 38.77 kg/m².    Intake/Output Summary (Last 24 hours) at 2020 1531  Last data filed at 2020 0835  Gross per 24 hour   Intake 1043 ml   Output 3000 ml   Net -1957 ml     Diet Regular; Consistent  Carbohydrate  ----------------------------------------------------------------------------------------------------------------------  Physical exam:  General: Comfortable,awake, alert, oriented to self, place, and time, well-developed and well-nourished.  No respiratory distress.  She is currently eating lunch   Skin:  Skin is warm and dry. No rash noted. No pallor.    HENT:  Head:  Normocephalic and atraumatic.  Mouth:  Moist mucous membranes.    Eyes:  Conjunctivae and EOM are normal.  Pupils are equal, round, and reactive to light.  No scleral icterus.    Neck:  Neck supple.  No JVD present.  No hepatojugular reflux  Pulmonary/Chest:  No respiratory distress, no wheezes, no crackles, with normal breath sounds and good air movement.  Cardiovascular:  Normal rate, irregular irregular rhythm, no gallop or rub, no murmur.  Abdominal:  Soft.  Bowel sounds are normal.  No distension and mild tenderness on the surgical wound from the port no guarding or rigidity.   Extremities:  No edema, no tenderness, and no deformity.  No red or swollen joints anywhere.  Strong pulses in all 4 extremities with no clubbing, no cyanosis, no edema.  Neurological:  Motor strength equal no obvious deficit, sensory grossly intact.   No cranial nerve deficit.  No tongue deviation.  No facial droop.  No slurred speech.    Genitourinary: No Shetty catheter  Back: No skin break  ----------------------------------------------------------------------------------------------------------------------  ----------------------------------------------------------------------------------------------------------------------  Results from last 7 days   Lab Units 05/12/20 2001   TROPONIN T ng/mL <0.010     Results from last 7 days   Lab Units 05/18/20  1356 05/18/20  0103 05/17/20  0112 05/16/20  0248 05/15/20  1354  05/14/20  0445  05/12/20  2317   CRP mg/dL  --  10.90* 10.61* 5.63*  --   --   --   --   --    LACTATE mmol/L  --   --   --   --   --   --    --   --  0.7   WBC 10*3/mm3 7.30  --   --  8.34 9.32   < > 6.48   < >  --    HEMOGLOBIN g/dL 10.2*  --   --  10.6* 10.8*   < > 11.4*   < >  --    HEMATOCRIT % 33.2*  --   --  35.0 35.9   < > 38.1   < >  --    MCV fL 88.3  --   --  90.2 90.4   < > 90.7   < >  --    MCHC g/dL 30.7*  --   --  30.3* 30.1*   < > 29.9*   < >  --    PLATELETS 10*3/mm3 213  --   --  175 181   < > 189   < >  --    INR   --   --   --   --  1.00  --  0.99  --   --     < > = values in this interval not displayed.         Results from last 7 days   Lab Units 05/18/20  0103 05/17/20  0112 05/16/20  0248   SODIUM mmol/L 140 140 140   POTASSIUM mmol/L 4.2 3.2* 3.6   MAGNESIUM mg/dL 2.3 1.7 1.8   CHLORIDE mmol/L 106 106 108*   CO2 mmol/L 20.7* 22.5 22.3   BUN mg/dL 9 6* 6*   CREATININE mg/dL 0.96 0.71 0.78   EGFR IF NONAFRICN AM mL/min/1.73 57* 81 72   CALCIUM mg/dL 9.2 8.2* 8.1*   GLUCOSE mg/dL 140* 106* 107*   ALBUMIN g/dL 3.15* 3.12* 3.12*   BILIRUBIN mg/dL 0.3 0.3 0.3   ALK PHOS U/L 83 79 79   AST (SGOT) U/L 14 13 13   ALT (SGPT) U/L 12 13 14   Estimated Creatinine Clearance: 65.3 mL/min (by C-G formula based on SCr of 0.96 mg/dL).    No results found for: AMMONIA      Blood Culture   Date Value Ref Range Status   05/12/2020 No growth at 5 days  Final   05/12/2020 No growth at 5 days  Final     Urine Culture   Date Value Ref Range Status   05/12/2020 25,000 CFU/mL Mixed Yareli Isolated  Final     No results found for: WOUNDCX  No results found for: STOOLCX    I have personally looked at the labs and they are summarized above.  ----------------------------------------------------------------------------------------------------------------------  Imaging Results (Last 24 Hours)     ** No results found for the last 24 hours. **        ----------------------------------------------------------------------------------------------------------------------  Assessment and Plan:  -Small bowel obstruction from incarcerated hernia status post  surgery  -Obesity with BMI 38.7  -COPD with no exacerbation  -Constipation  -Essential hypertension  -Mild hypoalbuminemia  -Chronic persistent atrial fibrillation rate controlled  -Chronic anticoagulation  -Dyslipidemia    Patient is tolerating so far with advancing diet, tomorrow morning if she has bowel movement probably she can go home, by that time we will also restart her Eliquis.  We will try Dulcolax suppository and lactulose today.      Tara Nino MD  05/18/20  15:31

## 2020-05-18 NOTE — PROGRESS NOTES
Discharge Planning Assessment   [facility]     Patient Name: Mayra Hays  MRN: [id number]  Today's Date: 5/18/2020    Admit Date: 5/12/2020      Discharge Plan     Row Name 05/18/20 1550       Plan    Plan  Pt admitted on 5/12/20.  Pt lives at home alone but stated she would return home at discharge with son, Vasu Cummings.  Pt currently does not utilize home health services.  Pt currently utilizes rolling walker, wheelchair and nebulizer via unknown provider.  SS will follow.         DIANE Vizcarra

## 2020-05-19 ENCOUNTER — READMISSION MANAGEMENT (OUTPATIENT)
Dept: CALL CENTER | Facility: HOSPITAL | Age: 75
End: 2020-05-19

## 2020-05-19 VITALS
HEIGHT: 66 IN | TEMPERATURE: 97.7 F | SYSTOLIC BLOOD PRESSURE: 117 MMHG | HEART RATE: 68 BPM | BODY MASS INDEX: 38.22 KG/M2 | OXYGEN SATURATION: 93 % | WEIGHT: 237.8 LBS | RESPIRATION RATE: 18 BRPM | DIASTOLIC BLOOD PRESSURE: 62 MMHG

## 2020-05-19 LAB
ANION GAP SERPL CALCULATED.3IONS-SCNC: 12.6 MMOL/L (ref 5–15)
APTT PPP: 63.5 SECONDS (ref 23.8–36.1)
BASOPHILS # BLD AUTO: 0.05 10*3/MM3 (ref 0–0.2)
BASOPHILS NFR BLD AUTO: 0.6 % (ref 0–1.5)
BUN BLD-MCNC: 9 MG/DL (ref 8–23)
BUN/CREAT SERPL: 10.3 (ref 7–25)
CALCIUM SPEC-SCNC: 9.2 MG/DL (ref 8.6–10.5)
CHLORIDE SERPL-SCNC: 105 MMOL/L (ref 98–107)
CO2 SERPL-SCNC: 22.4 MMOL/L (ref 22–29)
CREAT BLD-MCNC: 0.87 MG/DL (ref 0.57–1)
DEPRECATED RDW RBC AUTO: 44.7 FL (ref 37–54)
EOSINOPHIL # BLD AUTO: 0.55 10*3/MM3 (ref 0–0.4)
EOSINOPHIL NFR BLD AUTO: 7.1 % (ref 0.3–6.2)
ERYTHROCYTE [DISTWIDTH] IN BLOOD BY AUTOMATED COUNT: 13.7 % (ref 12.3–15.4)
GFR SERPL CREATININE-BSD FRML MDRD: 64 ML/MIN/1.73
GLUCOSE BLD-MCNC: 125 MG/DL (ref 65–99)
GLUCOSE BLDC GLUCOMTR-MCNC: 107 MG/DL (ref 70–130)
GLUCOSE BLDC GLUCOMTR-MCNC: 153 MG/DL (ref 70–130)
HCT VFR BLD AUTO: 34.1 % (ref 34–46.6)
HGB BLD-MCNC: 10.4 G/DL (ref 12–15.9)
IMM GRANULOCYTES # BLD AUTO: 0.15 10*3/MM3 (ref 0–0.05)
IMM GRANULOCYTES NFR BLD AUTO: 1.9 % (ref 0–0.5)
LYMPHOCYTES # BLD AUTO: 1.39 10*3/MM3 (ref 0.7–3.1)
LYMPHOCYTES NFR BLD AUTO: 18 % (ref 19.6–45.3)
MCH RBC QN AUTO: 27.2 PG (ref 26.6–33)
MCHC RBC AUTO-ENTMCNC: 30.5 G/DL (ref 31.5–35.7)
MCV RBC AUTO: 89.3 FL (ref 79–97)
MONOCYTES # BLD AUTO: 0.93 10*3/MM3 (ref 0.1–0.9)
MONOCYTES NFR BLD AUTO: 12 % (ref 5–12)
NEUTROPHILS # BLD AUTO: 4.65 10*3/MM3 (ref 1.7–7)
NEUTROPHILS NFR BLD AUTO: 60.4 % (ref 42.7–76)
NRBC BLD AUTO-RTO: 0 /100 WBC (ref 0–0.2)
PLATELET # BLD AUTO: 185 10*3/MM3 (ref 140–450)
PMV BLD AUTO: 11.5 FL (ref 6–12)
POTASSIUM BLD-SCNC: 3.9 MMOL/L (ref 3.5–5.2)
RBC # BLD AUTO: 3.82 10*6/MM3 (ref 3.77–5.28)
SODIUM BLD-SCNC: 140 MMOL/L (ref 136–145)
WBC NRBC COR # BLD: 7.72 10*3/MM3 (ref 3.4–10.8)

## 2020-05-19 PROCEDURE — 85730 THROMBOPLASTIN TIME PARTIAL: CPT | Performed by: HOSPITALIST

## 2020-05-19 PROCEDURE — 94799 UNLISTED PULMONARY SVC/PX: CPT

## 2020-05-19 PROCEDURE — 80048 BASIC METABOLIC PNL TOTAL CA: CPT | Performed by: HOSPITALIST

## 2020-05-19 PROCEDURE — 25010000002 PIPERACILLIN SOD-TAZOBACTAM PER 1 G: Performed by: INTERNAL MEDICINE

## 2020-05-19 PROCEDURE — 99024 POSTOP FOLLOW-UP VISIT: CPT | Performed by: SURGERY

## 2020-05-19 PROCEDURE — 85025 COMPLETE CBC W/AUTO DIFF WBC: CPT | Performed by: HOSPITALIST

## 2020-05-19 PROCEDURE — 82962 GLUCOSE BLOOD TEST: CPT

## 2020-05-19 PROCEDURE — 25010000002 FUROSEMIDE PER 20 MG: Performed by: HOSPITALIST

## 2020-05-19 PROCEDURE — 25010000002 MORPHINE PER 10 MG: Performed by: SURGERY

## 2020-05-19 RX ORDER — METOPROLOL TARTRATE 50 MG/1
25 TABLET, FILM COATED ORAL DAILY
Start: 2020-05-19 | End: 2021-03-23 | Stop reason: DRUGHIGH

## 2020-05-19 RX ORDER — FUROSEMIDE 10 MG/ML
20 INJECTION INTRAMUSCULAR; INTRAVENOUS ONCE
Status: COMPLETED | OUTPATIENT
Start: 2020-05-19 | End: 2020-05-19

## 2020-05-19 RX ADMIN — PIPERACILLIN AND TAZOBACTAM 3.38 G: 3; .375 INJECTION, POWDER, FOR SOLUTION INTRAVENOUS at 05:03

## 2020-05-19 RX ADMIN — FUROSEMIDE 20 MG: 10 INJECTION, SOLUTION INTRAMUSCULAR; INTRAVENOUS at 12:21

## 2020-05-19 RX ADMIN — PANTOPRAZOLE SODIUM 40 MG: 40 TABLET, DELAYED RELEASE ORAL at 05:03

## 2020-05-19 RX ADMIN — APIXABAN 5 MG: 5 TABLET, FILM COATED ORAL at 12:21

## 2020-05-19 RX ADMIN — IPRATROPIUM BROMIDE AND ALBUTEROL SULFATE 3 ML: .5; 3 SOLUTION RESPIRATORY (INHALATION) at 07:02

## 2020-05-19 RX ADMIN — BUDESONIDE AND FORMOTEROL FUMARATE DIHYDRATE 2 PUFF: 80; 4.5 AEROSOL RESPIRATORY (INHALATION) at 07:02

## 2020-05-19 RX ADMIN — PIPERACILLIN AND TAZOBACTAM 3.38 G: 3; .375 INJECTION, POWDER, FOR SOLUTION INTRAVENOUS at 12:23

## 2020-05-19 RX ADMIN — MORPHINE SULFATE 1 MG: 2 INJECTION, SOLUTION INTRAMUSCULAR; INTRAVENOUS at 08:17

## 2020-05-19 RX ADMIN — SODIUM CHLORIDE, PRESERVATIVE FREE 10 ML: 5 INJECTION INTRAVENOUS at 08:49

## 2020-05-19 NOTE — PROGRESS NOTES
Discharge Planning Assessment   [facility]     Patient Name: Mayra Hays  MRN: [id number]  Today's Date: 5/19/2020    Admit Date: 5/12/2020        Discharge Plan     Row Name 05/19/20 1211       Plan    Final Discharge Disposition Code  01 - home or self-care    Final Note  Pt to be discharged home.           KASH VizcarraW

## 2020-05-19 NOTE — PLAN OF CARE
Pt resting with some complaints of pain relieved by morphine. Able to get up to bedside commode independently. 2 BM so far throughout shift. In no acute distress. Will continue to monitor and follow plan of care.

## 2020-05-19 NOTE — PROGRESS NOTES
CC: incarcerated ventral hernia with SBO     POD 5: ventral hernia repair     Patient with BM overnight and this morning.  Somewhat distended but tolerating diet.  +flatus.  Ambulating to bedside commode.     AFVSS  RRR  CTAB  S/appropriately tender/incisions c/d/i, +BS, slight distension     73 yo F s/p ventral hernia repair with mesh.  -continue diet  -ambulate  -OK to discharge from surgical standpoint once ambulating effectively and anticoagulation status stable  -Follow up 2 weeks after discharge  -No lifting over 10 lbs for 6 weeks

## 2020-05-19 NOTE — DISCHARGE SUMMARY
[facility] HOSPITALIST MEDICINE DISCHARGE SUMMARY    Patient Identification:  Name:  Mayra Hays  Age:  74 y.o.  Sex:  female  :  1945  MRN:  [id number]  Visit Number:  [id number]    Date of Admission: 2020  Date of Discharge: May 19, 2020  DISCHARGE DISPOSITION   Stable  PCP: Niki Antony APRN    DISCHARGE DIAGNOSIS : Incarcerated ventral hernia with subsequent small bowel obstruction status post hernia repair with mesh, obstipation now resolved, COPD without exacerbation, chronic hypoxic respiratory failure on oxygen supplementation at home, COPD, paroxysmal atrial fibrillation, essential hypertension, chronic anticoagulation for stroke prophylaxis,  dyslipidemia, diet-controlled diabetes, SIRS secondary to incarcerated hernia.    HOSPITAL COURSE  Patient is a 74 y.o. female presented to [facility] complaining of abdominal pain with nausea and vomiting, work-up includes CT scan of the abdomen pelvis revealing incarcerated ventral hernia with high-grade small bowel obstruction, NG tube was placed, she was placed n.p.o., UA also revealed possible UTI, cultures were negative.  She was empirically on antibiotic, surgery was consulted, patient did not improve hence she underwent surgery by Dr. Velásquez.  Prior to surgery she was placed on heparin drip and direct oral anticoagulant was held.  Postop was uneventful except for constipation which was addressed with lactulose and Dulcolax with good results.  Her COPD seems to remain stable.  She was challenged with diet without any difficulties and has been eating solid foods for 2 days.  Rest of her stay was uneventful, we did give her 20 mg of Lasix IV which she takes at home Bumex 1 mg as needed for leg edema.  She has been followed postop by surgery service and was cleared for discharge.  Patient is advised to come to the emergency room immediately should she develop fever, nausea and vomiting, abdominal pain, wound drainage,  and new abdominal pain.  She is not allowed to lift more than 10 pounds for 6 weeks as recommended by Dr. Velásquez.    VITAL SIGNS:      05/17/20  0526 05/18/20  0500 05/19/20  0500   Weight: 109 kg (239 lb 6.4 oz) 109 kg (240 lb 3.2 oz) 108 kg (237 lb 12.8 oz)     Body mass index is 38.38 kg/m².  Vitals:    05/19/20 1100   BP: 117/62   Pulse: 68   Resp: 18   Temp: 97.7 °F (36.5 °C)   SpO2: 93%     PHYSICAL EXAM:  General: Comfortable,awake, alert, oriented to self, place, and time, well-developed, obese, she is out of bed to chair.    No respiratory distress.     Skin:  Skin is warm and dry. No rash noted. No pallor.    HENT:  Head:  Normocephalic and atraumatic.  Mouth:  Moist mucous membranes.    Eyes:  Conjunctivae and EOM are normal.  Pupils are equal, round, and reactive to light.  No scleral icterus.    Neck:  Neck supple.  No JVD present.  No hepatojugular reflux  Pulmonary/Chest:  No respiratory distress, no wheezes, occasional rhonchi bilateral, no expiratory phase lag, with normal breath sounds and good air movement.  Cardiovascular:  Normal rate, irregular irregular rhythm, no gallop or rub, no murmur.  Abdominal:  Soft.  Bowel sounds are normal.  No distension and mild tenderness on the surgical wound from the port no guarding or rigidity.   Extremities:  No edema, no tenderness, and no deformity.  No red or swollen joints anywhere.  Strong pulses in all 4 extremities with no clubbing, no cyanosis, no edema.  Neurological:  Motor strength equal no obvious deficit, sensory grossly intact.   No cranial nerve deficit.  No tongue deviation.  No facial droop.  No slurred speech.    Genitourinary: No Shetty catheter  Back: No skin break    ----------    DISCHARGE MEDICATIONS:     Discharge Medications      Changes to Medications      Instructions Start Date   metoprolol tartrate 50 MG tablet  Commonly known as:  LOPRESSOR  What changed:  how much to take   25 mg, Oral, Daily         Continue These Medications       Instructions Start Date   apixaban 5 MG tablet tablet  Commonly known as:  ELIQUIS   5 mg, Oral, 2 Times Daily, Pt and daughter report she takes Eliquis but cannot find it filled any more recently than Oct 2019 at [facility] for 30 days supply.      atorvastatin 20 MG tablet  Commonly known as:  LIPITOR   20 mg, Oral, Daily      Breo Ellipta 100-25 MCG/INH inhaler  Generic drug:  Fluticasone Furoate-Vilanterol   Inhale 1 puff by mouth once daily.      bumetanide 1 MG tablet  Commonly known as:  BUMEX   1 mg, Oral, Daily PRN      ferrous sulfate 325 (65 FE) MG tablet   325 mg, Oral, Daily With Breakfast      hydrOXYzine pamoate 25 MG capsule  Commonly known as:  VISTARIL   25 mg, Oral, Nightly      loratadine 10 MG tablet  Commonly known as:  CLARITIN   10 mg, Oral, Daily PRN      pantoprazole 40 MG EC tablet  Commonly known as:  PROTONIX   40 mg, Oral, Daily PRN      potassium chloride 20 MEQ CR tablet  Commonly known as:  K-DUR,KLOR-CON   20 mEq, Oral, Daily         Stop These Medications    esomeprazole 20 MG capsule  Commonly known as:  nexIUM              Your Scheduled Appointments    May 26, 2020 11:15 AM EDT  Follow Up with Martha Nelson PA-C  [facility] CARDIOLOGY (--) [address]  [place] [address] [phone]   Arrive 15 minutes prior to appointment.     May 27, 2020  2:50 PM EDT  Post-Op with Petr Velásquez MD  [facility] GENERAL SURGERY (--) [address]  [place] [id number] [phone]      Aug 12, 2020  1:30 PM EDT  Follow Up with ERASTO Mckeon  [facility] PULMONOLOGY CRITICAL CARE (--) [address]  [facility] [id number] [phone]   Arrive 15 minutes prior to appointment.      Additional instructions:        Pt  Has  An  Apt  For  Davy clement  For  May 20  At  10  Am   martha Nelson  For  May 26  At  11  :15  And  Dr Velásquez  For  May  27  At  3  pm                Activity Instructions     As  tolerated.              Additional Instructions for the Follow-ups that You Need to Schedule     Discharge Follow-up with PCP   As directed       Currently Documented PCP:    Niki Antony APRN    PCP Phone Number:    [phone]     Follow Up Details:  ERASTO Arechiga         Discharge Follow-up with Specified Provider: Dr. Velásquez; 1 Week   As directed      To:  Dr. Velásquez    Follow Up:  1 Week           Follow-up Information     Niki Antony APRN .    Specialty:  Nurse Practitioner  Why:  ERASTO Arechiga  Contact information:  [address] [place] [address]  [phone]                   Tara Nino MD  05/19/20  13:39    Please note that this discharge summary required more than 30 minutes to complete.

## 2020-05-19 NOTE — DISCHARGE INSTR - APPOINTMENTS
Pt  Has  An  Apt  For  Davy clement  For  May 20  At  10  Am   martha Nelson  For  May 26  At  11  :15  And  Dr Velásquez  For  May  27  At  3  pm

## 2020-05-20 NOTE — OUTREACH NOTE
Prep Survey      Responses   [facility]   Is LACE score < 7 ?  No   Eligibility  Readm Mgmt   Discharge diagnosis  Incarcerated ventral hernia with subsequent small bowel obstruction status post hernia repair with mesh   COVID-19 Test Status  Negative   Does the patient have one of the following disease processes/diagnoses(primary or secondary)?  General Surgery   Does the patient have Home health ordered?  No   Is there a DME ordered?  No   Prep survey completed?  Yes          Irma Frausto RN

## 2020-05-21 ENCOUNTER — READMISSION MANAGEMENT (OUTPATIENT)
Dept: CALL CENTER | Facility: HOSPITAL | Age: 75
End: 2020-05-21

## 2020-05-21 NOTE — OUTREACH NOTE
General Surgery Week 1 Survey      Responses   [facility] patient discharged from?  [facility]   Does the patient have one of the following disease processes/diagnoses(primary or secondary)?  General Surgery   Is there a successful TCM telephone encounter documented?  No   Week 1 attempt successful?  No   Unsuccessful attempts  Attempt 1          Slime Lin RN

## 2020-05-22 ENCOUNTER — READMISSION MANAGEMENT (OUTPATIENT)
Dept: CALL CENTER | Facility: HOSPITAL | Age: 75
End: 2020-05-22

## 2020-05-22 NOTE — OUTREACH NOTE
"General Surgery Week 1 Survey      Responses   [facility] patient discharged from?  [facility]   Does the patient have one of the following disease processes/diagnoses(primary or secondary)?  General Surgery   Is there a successful TCM telephone encounter documented?  No   Week 1 attempt successful?  Yes   Call start time  1423   Call end time  1429   Discharge diagnosis  Incarcerated ventral hernia with subsequent small bowel obstruction status post hernia repair with mesh   List who call center can speak with  Hailey   Person spoke with today (if not patient) and relationship  Pt Hailey roa   Meds reviewed with patient/caregiver?  Yes   Is the patient having any side effects they believe may be caused by any medication additions or changes?  No   Does the patient have all medications related to this admission filled (includes all antibiotics, pain medications, etc.)  Yes   Is the patient taking all medications as directed (includes completed medication regime)?  No   What is preventing the patient from taking all medications as directed?  Other   Nursing Interventions  Nurse provided patient education, Advised patient to call provider   Medication comments  Pt Hailey roa reports the hospital doctor \"said\" to stop the metoprolol and atorvastatin. I advised her to discuss the medicines with the Cardiologist.    Does the patient have a follow up appointment scheduled with their surgeon?  Yes   Has the patient kept scheduled appointments due by today?  Yes   Has home health visited the patient within 72 hours of discharge?  N/A   Psychosocial issues?  No   Did the patient receive a copy of their discharge instructions?  Yes   Nursing interventions  Reviewed instructions with patient   What is the patient's perception of their health status since discharge?  Improving   Nursing interventions  Nurse provided patient education   Is the patient /caregiver able to teach back basic post-op care?  Continue use of " incentive spirometry at least 1 week post discharge, Take showers only when approved by MD-sponge bathalicia until then, Keep incision areas clean,dry and protected, Do not remove steri-strips   Is the patient/caregiver able to teach back signs and symptoms of incisional infection?  Increased redness, swelling or pain at the incisonal site, Increased drainage or bleeding, Incisional warmth, Pus or odor from incision, Fever   Is the patient/caregiver able to teach back steps to recovery at home?  Set small, achievable goals for return to baseline health, Eat a well-balance diet   Is the patient/caregiver able to teach back the hierarchy of who to call/visit for symptoms/problems? PCP, Specialist, Home health nurse, Urgent Care, ED, 911  Yes   Week 1 call completed?  Yes          Rolly Morris, RN

## 2020-05-26 ENCOUNTER — OFFICE VISIT (OUTPATIENT)
Dept: CARDIOLOGY | Facility: CLINIC | Age: 75
End: 2020-05-26

## 2020-05-26 VITALS
RESPIRATION RATE: 16 BRPM | BODY MASS INDEX: 33.8 KG/M2 | HEART RATE: 60 BPM | WEIGHT: 228.2 LBS | TEMPERATURE: 98.6 F | HEIGHT: 69 IN | DIASTOLIC BLOOD PRESSURE: 79 MMHG | SYSTOLIC BLOOD PRESSURE: 172 MMHG

## 2020-05-26 DIAGNOSIS — I50.32 CHRONIC DIASTOLIC HEART FAILURE (HCC): ICD-10-CM

## 2020-05-26 DIAGNOSIS — I48.0 PAROXYSMAL ATRIAL FIBRILLATION (HCC): Primary | ICD-10-CM

## 2020-05-26 DIAGNOSIS — I10 ESSENTIAL HYPERTENSION: ICD-10-CM

## 2020-05-26 PROCEDURE — 99213 OFFICE O/P EST LOW 20 MIN: CPT | Performed by: PHYSICIAN ASSISTANT

## 2020-05-26 RX ORDER — ALBUTEROL SULFATE 90 UG/1
2 AEROSOL, METERED RESPIRATORY (INHALATION) EVERY 4 HOURS PRN
COMMUNITY
End: 2022-06-07 | Stop reason: SDUPTHER

## 2020-05-26 NOTE — PROGRESS NOTES
Niki Antony, APRN  Mayra Hays  1945 05/26/2020    Patient Active Problem List   Diagnosis   • Pneumonia   • Sepsis due to pneumonia (CMS/HCC)   • Chronic diastolic heart failure (CMS/HCC)   • Elevated troponin   • Chest pain in adult   • Simple chronic bronchitis (CMS/HCC)   • Essential hypertension   • Type 2 diabetes mellitus (CMS/HCC)   • Abnormal nuclear stress test with moderate to large size anteroapical and lateral wall myocardial ischemia.   • Pneumonia of left lower lobe due to infectious organism (CMS/HCC)   • Acute renal insufficiency   • Paroxysmal atrial fibrillation (CMS/HCC)   • Chest pain   • OSWALDO (obstructive sleep apnea)   • Hypoxia   • Nausea & vomiting   • Ventral hernia without obstruction or gangrene   • COPD (chronic obstructive pulmonary disease) (CMS/HCC)   • Chronic obstructive pulmonary disease (CMS/HCC)   • SBO (small bowel obstruction) (CMS/HCC)       Dear Niki Antony, APRN:    Subjective     History of Present Illness:    Chief Complaint   Patient presents with   • Atrial Fibrillation     3 mos follow   • Shortness of Breath     routine activity   • Edema     LE   • Med Management     list provided       Mayra Hays is a pleasant 74 y.o. female with a past medical history significant for   paroxysmal atrial fibrillation anticoagulated with Eliquis.  She is here for a regular cardiology follow-up    Patient has had an eventful last few months.  She did have hernia repair however subsequently did develop a small bowel obstruction she was hospitalized from 5 12-5 19 for this and did eventually have surgery for repair.  Thankfully she recovered from surgery without any sequelae and was soon discharged in stable condition once she was able to produce bowel movements. Patient denies any chest pain, shortness of breath, palpitations, dizziness, syncope or near syncope. She also reports that she is back on eliquis and denies any bleeding issues with it.     No Known  Allergies:      Current Outpatient Medications:   •  albuterol sulfate  (90 Base) MCG/ACT inhaler, Inhale 2 puffs Every 4 (Four) Hours As Needed for Wheezing., Disp: , Rfl:   •  apixaban (ELIQUIS) 5 MG tablet tablet, Take 5 mg by mouth 2 (Two) Times a Day. Pt and daughter report she takes Eliquis but cannot find it filled any more recently than Oct 2019 at [facility] for 30 days supply., Disp: , Rfl:   •  atorvastatin (LIPITOR) 20 MG tablet, Take 20 mg by mouth Daily., Disp: , Rfl:   •  BREO ELLIPTA 100-25 MCG/INH inhaler, Inhale 1 puff by mouth once daily., Disp: 60 each, Rfl: 0  •  bumetanide (BUMEX) 1 MG tablet, Take 1 mg by mouth Daily As Needed (swelling)., Disp: , Rfl:   •  ferrous sulfate 325 (65 FE) MG tablet, Take 325 mg by mouth Daily With Breakfast., Disp: , Rfl:   •  hydrOXYzine pamoate (VISTARIL) 25 MG capsule, Take 25 mg by mouth Every Night., Disp: , Rfl:   •  loratadine (CLARITIN) 10 MG tablet, Take 10 mg by mouth Daily As Needed for Allergies., Disp: , Rfl:   •  metoprolol tartrate (LOPRESSOR) 50 MG tablet, Take 0.5 tablets by mouth Daily., Disp: , Rfl:   •  O2 (OXYGEN), Inhale 2 L/min As Needed., Disp: , Rfl:   •  pantoprazole (PROTONIX) 40 MG EC tablet, Take 40 mg by mouth Daily As Needed (acid reflux)., Disp: , Rfl:   •  potassium chloride (K-DUR,KLOR-CON) 20 MEQ CR tablet, Take 20 mEq by mouth Daily., Disp: , Rfl:     The following portions of the patient's history were reviewed and updated as appropriate: allergies, current medications, past family history, past medical history, past social history, past surgical history and problem list.    Social History     Tobacco Use   • Smoking status: Former Smoker     Packs/day: 3.00     Types: Cigarettes     Last attempt to quit: 2015     Years since quittin.4   • Smokeless tobacco: Never Used   Substance Use Topics   • Alcohol use: No   • Drug use: No       Review of Systems   Constitution: Negative for malaise/fatigue.   Cardiovascular:  "Positive for leg swelling. Negative for chest pain, dyspnea on exertion, irregular heartbeat and palpitations.   Respiratory: Positive for shortness of breath and wheezing. Negative for cough.    Hematologic/Lymphatic: Negative for bleeding problem. Does not bruise/bleed easily.   Gastrointestinal: Negative for nausea and vomiting.   Neurological: Negative for weakness.       Objective   Vitals:    05/26/20 1130   BP: 172/79   Pulse: 60   Resp: 16   Temp: 98.6 °F (37 °C)   Weight: 104 kg (228 lb 3.2 oz)   Height: 175.3 cm (69\")     Body mass index is 33.7 kg/m².    Physical Exam   Constitutional: She is oriented to person, place, and time. She appears well-developed and well-nourished. No distress.   HENT:   Head: Normocephalic and atraumatic.   Cardiovascular: Normal rate, regular rhythm and normal heart sounds.   Pulmonary/Chest: Effort normal and breath sounds normal. No respiratory distress.   Musculoskeletal: She exhibits no edema.   Neurological: She is alert and oriented to person, place, and time.   Skin: She is not diaphoretic.       Lab Results   Component Value Date     05/19/2020    K 3.9 05/19/2020     05/19/2020    CO2 22.4 05/19/2020    BUN 9 05/19/2020    CREATININE 0.87 05/19/2020    GLUCOSE 125 (H) 05/19/2020    CALCIUM 9.2 05/19/2020    AST 14 05/18/2020    ALT 12 05/18/2020    ALKPHOS 83 05/18/2020     Lab Results   Component Value Date    CKTOTAL 14 (L) 06/25/2018     Lab Results   Component Value Date    WBC 7.72 05/19/2020    HGB 10.4 (L) 05/19/2020    HCT 34.1 05/19/2020     05/19/2020     Lab Results   Component Value Date    INR 1.00 05/15/2020    INR 0.99 05/14/2020    INR 1.02 06/01/2017     Lab Results   Component Value Date    MG 2.3 05/18/2020     Lab Results   Component Value Date    TSH 2.670 12/18/2019    TRIG 119 12/13/2018    HDL 33 (L) 12/13/2018    LDL 72 12/13/2018      Lab Results   Component Value Date    BNP 29.0 02/01/2019       During this visit the " following were done:  Labs Reviewed [x]    Labs Ordered []    Radiology Reports Reviewed [x]    Radiology Ordered []    PCP Records Reviewed []    Referring Provider Records Reviewed []    ER Records Reviewed []    Hospital Records Reviewed []    History Obtained From Family []    Radiology Images Reviewed []    Other Reviewed []    Records Requested []       Procedures    Assessment/Plan    Diagnosis Plan   1. Paroxysmal atrial fibrillation (CMS/HCC)     2. Chronic diastolic heart failure (CMS/HCC)     3. Essential hypertension              Recommendations:  1. Patient appears stable form cardiac standpoint. She is asymptomatic, her blood pressure is elevated today, however, since it has been controlled the last several times I asked her to check this daily and to call us if it remains >150 mmHg systolic. If elevated at next visit will increase or add an antihypertensive at that time.       Return in about 6 months (around 11/26/2020).    As always, I appreciate very much the opportunity to participate in the cardiovascular care of your patients.      With Best Regards,    Darek Nelson PA-C

## 2020-05-27 ENCOUNTER — OFFICE VISIT (OUTPATIENT)
Dept: SURGERY | Facility: CLINIC | Age: 75
End: 2020-05-27

## 2020-05-27 VITALS
DIASTOLIC BLOOD PRESSURE: 70 MMHG | SYSTOLIC BLOOD PRESSURE: 142 MMHG | WEIGHT: 226 LBS | HEART RATE: 66 BPM | BODY MASS INDEX: 33.47 KG/M2 | HEIGHT: 69 IN

## 2020-05-27 DIAGNOSIS — K43.9 VENTRAL HERNIA WITHOUT OBSTRUCTION OR GANGRENE: ICD-10-CM

## 2020-05-27 DIAGNOSIS — K56.609 SBO (SMALL BOWEL OBSTRUCTION) (HCC): Primary | ICD-10-CM

## 2020-05-27 PROCEDURE — 99024 POSTOP FOLLOW-UP VISIT: CPT | Performed by: SURGERY

## 2020-05-27 NOTE — PROGRESS NOTES
Subjective   Mayra Hays is a 74 y.o. female  is here today for follow-up.         Mayra Hays is a 74 y.o. female here for follow up after emergent ventral hernia repair secondary to incarceration and small bowel obstruction.  The patient is doing well since discharge and having regular bowel function.  Her incisions have healed well and she is back on her therapeutic anticoagulation.  No evidence of complication or recurrence.        Assessment     Mayra was seen today for s/p ventral hernia repair.    Diagnoses and all orders for this visit:    SBO (small bowel obstruction) (CMS/HCC)    Ventral hernia without obstruction or gangrene      Mayra Hays is a 74 y.o. female status post emergent laparoscopic ventral hernia repair secondary to small bowel obstruction.  The patient had an incarcerated piece of small bowel in the patient's incisional hernia just near her umbilicus from previous cholecystectomy.  She is doing well without evidence of recurrence or complication.  She will follow-up as needed.

## 2020-05-29 ENCOUNTER — READMISSION MANAGEMENT (OUTPATIENT)
Dept: CALL CENTER | Facility: HOSPITAL | Age: 75
End: 2020-05-29

## 2020-05-29 NOTE — OUTREACH NOTE
General Surgery Week 2 Survey      Responses   [facility] patient discharged from?  [facility]   Does the patient have one of the following disease processes/diagnoses(primary or secondary)?  General Surgery   Week 2 attempt successful?  Yes   Call start time  1041   Call end time  1046   Discharge diagnosis  Incarcerated ventral hernia with subsequent small bowel obstruction status post hernia repair with mesh   List who call center can speak with  Hailey   Person spoke with today (if not patient) and relationship   Hailey Stoll reviewed with patient/caregiver?  Yes   Is the patient having any side effects they believe may be caused by any medication additions or changes?  No   Does the patient have all medications related to this admission filled (includes all antibiotics, pain medications, etc.)  Yes   Medication comments  Pt daughter reports she is doing well.    Has the patient kept scheduled appointments due by today?  Yes   Has home health visited the patient within 72 hours of discharge?  N/A   Psychosocial issues?  No   Comments  Daughter is helping her.    Did the patient receive a copy of their discharge instructions?  Yes   Nursing interventions  Reviewed instructions with patient, Educated on MyChart   What is the patient's perception of their health status since discharge?  Improving   Nursing interventions  Nurse provided patient education   Is the patient /caregiver able to teach back basic post-op care?  Continue use of incentive spirometry at least 1 week post discharge, Take showers only when approved by MD-sponge bathe until then, Keep incision areas clean,dry and protected, Do not remove steri-strips   Is the patient/caregiver able to teach back signs and symptoms of incisional infection?  Increased redness, swelling or pain at the incisonal site, Increased drainage or bleeding, Incisional warmth, Pus or odor from incision, Fever   Is the patient/caregiver able to teach back steps to recovery at  home?  Set small, achievable goals for return to baseline health, Eat a well-balance diet   Is the patient/caregiver able to teach back the hierarchy of who to call/visit for symptoms/problems? PCP, Specialist, Home health nurse, Urgent Care, ED, 911  Yes   Week 2 call completed?  Yes          Leah Tipton RN

## 2020-06-04 ENCOUNTER — READMISSION MANAGEMENT (OUTPATIENT)
Dept: CALL CENTER | Facility: HOSPITAL | Age: 75
End: 2020-06-04

## 2020-06-04 NOTE — OUTREACH NOTE
General Surgery Week 3 Survey      Responses   [facility] patient discharged from?  [facility]   Does the patient have one of the following disease processes/diagnoses(primary or secondary)?  General Surgery   Week 3 attempt successful?  No   Unsuccessful attempts  Attempt 1          Larissa Collazo RN

## 2020-06-05 ENCOUNTER — READMISSION MANAGEMENT (OUTPATIENT)
Dept: CALL CENTER | Facility: HOSPITAL | Age: 75
End: 2020-06-05

## 2020-06-05 NOTE — OUTREACH NOTE
General Surgery Week 5 Survey      Responses   [facility] patient discharged from?  [facility]   Does the patient have one of the following disease processes/diagnoses(primary or secondary)?  General Surgery   Call start time  1314   Call end time  1315   Discharge diagnosis  Incarcerated ventral hernia with subsequent small bowel obstruction status post hernia repair with mesh   Is the patient taking all medications as directed (includes completed medication regime)?  Yes   Has the patient kept scheduled appointments due by today?  Yes   What is the patient's perception of their health status since discharge?  Cherry Estrella RN

## 2020-06-15 ENCOUNTER — READMISSION MANAGEMENT (OUTPATIENT)
Dept: CALL CENTER | Facility: HOSPITAL | Age: 75
End: 2020-06-15

## 2020-06-15 NOTE — OUTREACH NOTE
General Surgery Week 4 Survey      Responses   [facility] patient discharged from?  [facility]   Does the patient have one of the following disease processes/diagnoses(primary or secondary)?  General Surgery   Week 4 attempt successful?  No          Tigist Taylor LPN

## 2020-07-08 NOTE — CONSULTS
"Diabetes Education  Assessment/Teaching    Patient Name:  Mayra Hays  YOB: 1945  MRN: [id number]  Admit Date:  6/12/2018      Assessment Date:  6/14/2018    Most Recent Value   General Information    Height  172.7 cm (68\")   Height Method  Stated   Weight  103 kg (227 lb)   Weight Method  Stated   Pregnancy Assessment   Diabetes History   What type of diabetes do you have?  Type 2   Length of Diabetes Diagnosis  1 - 5 years   Current DM knowledge  fair   Do you test your blood sugar at home?  -- [\"my daughter does it for me\"]   How would you rate your diabetes control?  good   What makes it difficult for you to take care of your diabetes or yourself?  I stay with  my daughter at night and she lets me go back home in the day when she is at work\"   Education Preferences   Nutrition Information   Assessment Topics   Healthy Eating - Assessment  Competent   Being Active - Assessment  Competent   Taking Medication - Assessment  Competent   Problem Solving - Assessment  Competent   Reducing Risk - Assessment  Competent   Healthy Coping - Assessment  Competent   Monitoring - Assessment  Competent   DM Goals            Most Recent Value   DM Education Needs   Meter  Other (comment) [client says family checks her blood sugars encouraged her to bring meter in if she wanted me to show her how to use]   Frequency of Testing  Daily   Medication  Oral   Reducing Risks  A1C testing [a1c 6.5]   Healthy Coping  Appropriate, Other (comment) [quit smoking 2 years ago]   Discharge Plan  Home   Motivation  Moderate   Teaching Method  Explanation, Discussion, Handouts, Teach back [information with name & phone # in case clients daughter with questions]   Patient Response  Verbalized understanding            Other Comments:          Electronically signed by:  Norma Hays RN  06/14/18 1:47 PM  "
Stroke

## 2020-10-21 ENCOUNTER — OFFICE VISIT (OUTPATIENT)
Dept: PULMONOLOGY | Facility: CLINIC | Age: 75
End: 2020-10-21

## 2020-10-21 VITALS
OXYGEN SATURATION: 95 % | WEIGHT: 220 LBS | SYSTOLIC BLOOD PRESSURE: 126 MMHG | HEART RATE: 86 BPM | DIASTOLIC BLOOD PRESSURE: 76 MMHG | BODY MASS INDEX: 32.58 KG/M2 | TEMPERATURE: 98.6 F | HEIGHT: 69 IN

## 2020-10-21 DIAGNOSIS — E66.9 OBESITY (BMI 30-39.9): ICD-10-CM

## 2020-10-21 DIAGNOSIS — G47.33 OSA (OBSTRUCTIVE SLEEP APNEA): ICD-10-CM

## 2020-10-21 DIAGNOSIS — J43.2 CENTRILOBULAR EMPHYSEMA (HCC): Primary | ICD-10-CM

## 2020-10-21 PROBLEM — R11.2 NAUSEA & VOMITING: Status: RESOLVED | Noted: 2019-12-18 | Resolved: 2020-10-21

## 2020-10-21 PROBLEM — K56.609 SBO (SMALL BOWEL OBSTRUCTION): Status: RESOLVED | Noted: 2020-05-12 | Resolved: 2020-10-21

## 2020-10-21 PROBLEM — J44.9 COPD (CHRONIC OBSTRUCTIVE PULMONARY DISEASE): Status: RESOLVED | Noted: 2020-03-11 | Resolved: 2020-10-21

## 2020-10-21 PROBLEM — J18.9 SEPSIS DUE TO PNEUMONIA (HCC): Status: RESOLVED | Noted: 2017-05-30 | Resolved: 2020-10-21

## 2020-10-21 PROBLEM — J41.0 SIMPLE CHRONIC BRONCHITIS (HCC): Status: RESOLVED | Noted: 2017-07-20 | Resolved: 2020-10-21

## 2020-10-21 PROBLEM — A41.9 SEPSIS DUE TO PNEUMONIA: Status: RESOLVED | Noted: 2017-05-30 | Resolved: 2020-10-21

## 2020-10-21 PROBLEM — J18.9 PNEUMONIA: Status: RESOLVED | Noted: 2017-05-30 | Resolved: 2020-10-21

## 2020-10-21 PROBLEM — R09.02 HYPOXIA: Status: RESOLVED | Noted: 2019-04-03 | Resolved: 2020-10-21

## 2020-10-21 PROBLEM — J18.9 PNEUMONIA OF LEFT LOWER LOBE DUE TO INFECTIOUS ORGANISM: Status: RESOLVED | Noted: 2018-06-12 | Resolved: 2020-10-21

## 2020-10-21 PROCEDURE — 99214 OFFICE O/P EST MOD 30 MIN: CPT | Performed by: NURSE PRACTITIONER

## 2020-10-21 PROCEDURE — 90694 VACC AIIV4 NO PRSRV 0.5ML IM: CPT | Performed by: NURSE PRACTITIONER

## 2020-10-21 PROCEDURE — G0008 ADMIN INFLUENZA VIRUS VAC: HCPCS | Performed by: NURSE PRACTITIONER

## 2020-10-21 NOTE — PROGRESS NOTES
Have you had the Influenza Vaccine? no   Would you like to receive this Vaccine today? yes    Have you had the Pneumonia Vaccine?  yes   Would you like to receive this Vaccine today? no    Are you a current smoker? no  Quit date? 2015    Subjective    Mayra Hays presents for the following Bronchitis      History of Present Illness     Ms. Hays is a 75 year old female with a medical history significant for CHF, COPD, CAD, diabetes, hyperlipidemia, GERD, hypertension, and MI.    She presents today for a routine follow up on Chronic bronchitis.  She states that overall she has been doing well since her last visit.  She does tell me that she started having shortness of breath a couple of weeks ago and was diagnosed with pneumonia by her PCP.  She is currently on Breo once daily and Spiriva once daily.  She uses albuterol neb treatments as needed.  She does have supplemental oxygen to use as needed and at night.  She states that she has not been using it lately because her oxygen saturation has been in the 90s.  She is noncompliant with her autopap at night.  She is a former smoker, quitting in 2015.    Review of Systems   Constitutional: Negative for activity change, fatigue and unexpected weight change.   HENT: Negative for congestion, postnasal drip and rhinorrhea.    Respiratory: Positive for shortness of breath. Negative for apnea, cough, chest tightness and wheezing.    Cardiovascular: Negative for chest pain and palpitations.   Gastrointestinal: Negative for nausea.   Allergic/Immunologic: Negative for environmental allergies.   Psychiatric/Behavioral: Negative for agitation and confusion.       Active Problems:  Problem List Items Addressed This Visit        Respiratory    Chronic obstructive pulmonary disease (CMS/HCC) - Primary    Overview     Added automatically from request for surgery [id number]           Other Visit Diagnoses     OSWALDO (obstructive sleep apnea)        Obesity (BMI 30-39.9)              Past  Medical History:  Past Medical History:   Diagnosis Date   • CHF (congestive heart failure) (CMS/[facility])    • Collapsed lung    • COPD (chronic obstructive pulmonary disease) (CMS/[facility])    • Coronary artery disease    • Diabetes mellitus (CMS/HCC)    • Elevated cholesterol    • GERD (gastroesophageal reflux disease)    • Hyperlipidemia    • Hypertension    • Myocardial infarction (CMS/[facility])    • Stroke (CMS/[facility])        Family History:  Family History   Problem Relation Age of Onset   • Heart disease Mother    • Heart disease Father        Social History:  Social History     Tobacco Use   • Smoking status: Former Smoker     Packs/day: 3.00     Types: Cigarettes     Quit date:      Years since quittin.8   • Smokeless tobacco: Never Used   Substance Use Topics   • Alcohol use: No       Current Medications:  Current Outpatient Medications   Medication Sig Dispense Refill   • albuterol sulfate  (90 Base) MCG/ACT inhaler Inhale 2 puffs Every 4 (Four) Hours As Needed for Wheezing.     • apixaban (ELIQUIS) 5 MG tablet tablet Take 5 mg by mouth 2 (Two) Times a Day. Pt and daughter report she takes Eliquis but cannot find it filled any more recently than Oct 2019 at [email or web address] for 30 days supply.     • atorvastatin (LIPITOR) 20 MG tablet Take 20 mg by mouth Daily.     • BREO ELLIPTA 100-25 MCG/INH inhaler Inhale 1 puff by mouth once daily. 60 each 0   • bumetanide (BUMEX) 1 MG tablet Take 1 mg by mouth Daily As Needed (swelling).     • ferrous sulfate 325 (65 FE) MG tablet Take 325 mg by mouth Daily With Breakfast.     • hydrOXYzine pamoate (VISTARIL) 25 MG capsule Take 25 mg by mouth Every Night.     • loratadine (CLARITIN) 10 MG tablet Take 10 mg by mouth Daily As Needed for Allergies.     • metoprolol tartrate (LOPRESSOR) 50 MG tablet Take 0.5 tablets by mouth Daily.     • O2 (OXYGEN) Inhale 2 L/min As Needed.     • pantoprazole (PROTONIX) 40 MG EC tablet Take 40 mg by mouth Daily As Needed (acid reflux).     •  "potassium chloride (K-DUR,KLOR-CON) 20 MEQ CR tablet Take 20 mEq by mouth Daily.       No current facility-administered medications for this visit.        Allergies:  No Known Allergies    Vitals:  /76   Pulse 86   Temp 98.6 °F (37 °C) (Temporal)   Ht 175.3 cm (69\")   Wt 99.8 kg (220 lb)   SpO2 95%   BMI 32.49 kg/m²     Imaging:    Imaging Results (Most Recent)     None          Pulmonary Functions Testing Results:    No results found for: FEV1, FVC, [id number], TLC, DLCO    Results for orders placed or performed during the hospital encounter of 05/12/20   Urine Culture - Urine, Urine, Clean Catch    Specimen: Urine, Clean Catch   Result Value Ref Range    Urine Culture 25,000 CFU/mL Mixed Yareli Isolated    Blood Culture - Blood, Arm, Right    Specimen: Arm, Right; Blood   Result Value Ref Range    Blood Culture No growth at 5 days    Blood Culture - Blood, Arm, Left    Specimen: Arm, Left; Blood   Result Value Ref Range    Blood Culture No growth at 5 days    [facility] In-House SARS-CoV-2, NAAT, NP Swab (NO TRANSPORT MEDIA) - Swab, Nasopharynx    Specimen: Nasopharynx; Swab   Result Value Ref Range    COVID19 Not Detected Not Detected - Ref. Range   Comprehensive Metabolic Panel    Specimen: Arm, Left; Blood   Result Value Ref Range    Glucose 111 (H) 65 - 99 mg/dL    BUN 17 8 - 23 mg/dL    Creatinine 0.98 0.57 - 1.00 mg/dL    Sodium 137 136 - 145 mmol/L    Potassium 4.2 3.5 - 5.2 mmol/L    Chloride 101 98 - 107 mmol/L    CO2 23.2 22.0 - 29.0 mmol/L    Calcium 9.1 8.6 - 10.5 mg/dL    Total Protein 7.2 6.0 - 8.5 g/dL    Albumin 4.08 3.50 - 5.20 g/dL    ALT (SGPT) 15 1 - 33 U/L    AST (SGOT) 15 1 - 32 U/L    Alkaline Phosphatase 111 39 - 117 U/L    Total Bilirubin 0.3 0.2 - 1.2 mg/dL    eGFR Non African Amer 56 (L) >60 mL/min/1.73    Globulin 3.1 gm/dL    A/G Ratio 1.3 g/dL    BUN/Creatinine Ratio 17.3 7.0 - 25.0    Anion Gap 12.8 5.0 - 15.0 mmol/L   Lipase    Specimen: Arm, Left; Blood   Result Value Ref Range "    Lipase 15 13 - 60 U/L   Urinalysis With Culture If Indicated - Urine, Clean Catch    Specimen: Urine, Clean Catch   Result Value Ref Range    Color, UA Yellow Yellow, Straw    Appearance, UA Cloudy (A) Clear    pH, UA <=5.0 5.0 - 8.0    Specific Gravity, UA 1.028 1.005 - 1.030    Glucose, UA Negative Negative    Ketones, UA Trace (A) Negative    Bilirubin, UA Negative Negative    Blood, UA Negative Negative    Protein, UA Negative Negative    Leuk Esterase, UA Trace (A) Negative    Nitrite, UA Negative Negative    Urobilinogen, UA 0.2 E.U./dL 0.2 - 1.0 E.U./dL   C-reactive Protein    Specimen: Arm, Left; Blood   Result Value Ref Range    C-Reactive Protein 0.27 0.00 - 0.50 mg/dL   Magnesium    Specimen: Arm, Left; Blood   Result Value Ref Range    Magnesium 1.8 1.6 - 2.4 mg/dL   CBC Auto Differential    Specimen: Arm, Left; Blood   Result Value Ref Range    WBC 6.77 3.40 - 10.80 10*3/mm3    RBC 4.84 3.77 - 5.28 10*6/mm3    Hemoglobin 13.2 12.0 - 15.9 g/dL    Hematocrit 41.6 34.0 - 46.6 %    MCV 86.0 79.0 - 97.0 fL    MCH 27.3 26.6 - 33.0 pg    MCHC 31.7 31.5 - 35.7 g/dL    RDW 13.1 12.3 - 15.4 %    RDW-SD 40.7 37.0 - 54.0 fl    MPV 11.5 6.0 - 12.0 fL    Platelets 217 140 - 450 10*3/mm3    Neutrophil % 51.3 42.7 - 76.0 %    Lymphocyte % 30.7 19.6 - 45.3 %    Monocyte % 12.1 (H) 5.0 - 12.0 %    Eosinophil % 4.9 0.3 - 6.2 %    Basophil % 0.6 0.0 - 1.5 %    Immature Grans % 0.4 0.0 - 0.5 %    Neutrophils, Absolute 3.47 1.70 - 7.00 10*3/mm3    Lymphocytes, Absolute 2.08 0.70 - 3.10 10*3/mm3    Monocytes, Absolute 0.82 0.10 - 0.90 10*3/mm3    Eosinophils, Absolute 0.33 0.00 - 0.40 10*3/mm3    Basophils, Absolute 0.04 0.00 - 0.20 10*3/mm3    Immature Grans, Absolute 0.03 0.00 - 0.05 10*3/mm3    nRBC 0.0 0.0 - 0.2 /100 WBC   Urinalysis, Microscopic Only - Urine, Clean Catch    Specimen: Urine, Clean Catch   Result Value Ref Range    RBC, UA 0-2 None Seen, 0-2 /HPF    WBC, UA 13-20 (A) None Seen, 0-2 /HPF    Bacteria, UA  1+ (A) None Seen /HPF    Squamous Epithelial Cells, UA 13-20 (A) None Seen, 0-2 /HPF    Hyaline Casts, UA 3-6 None Seen /LPF    Methodology Automated Microscopy    Lactic Acid, Plasma    Specimen: Arm, Right; Blood   Result Value Ref Range    Lactate 0.7 0.5 - 2.0 mmol/L   Troponin    Specimen: Arm, Left; Blood   Result Value Ref Range    Troponin T <0.010 0.000 - 0.030 ng/mL   Hemoglobin A1c    Specimen: Arm, Left; Blood   Result Value Ref Range    Hemoglobin A1C 6.60 (H) 4.80 - 5.60 %   CBC Auto Differential    Specimen: Blood   Result Value Ref Range    WBC 7.66 3.40 - 10.80 10*3/mm3    RBC 4.50 3.77 - 5.28 10*6/mm3    Hemoglobin 12.2 12.0 - 15.9 g/dL    Hematocrit 39.6 34.0 - 46.6 %    MCV 88.0 79.0 - 97.0 fL    MCH 27.1 26.6 - 33.0 pg    MCHC 30.8 (L) 31.5 - 35.7 g/dL    RDW 13.1 12.3 - 15.4 %    RDW-SD 42.3 37.0 - 54.0 fl    MPV 11.5 6.0 - 12.0 fL    Platelets 193 140 - 450 10*3/mm3    Neutrophil % 65.6 42.7 - 76.0 %    Lymphocyte % 18.5 (L) 19.6 - 45.3 %    Monocyte % 11.5 5.0 - 12.0 %    Eosinophil % 3.5 0.3 - 6.2 %    Basophil % 0.4 0.0 - 1.5 %    Immature Grans % 0.5 0.0 - 0.5 %    Neutrophils, Absolute 5.02 1.70 - 7.00 10*3/mm3    Lymphocytes, Absolute 1.42 0.70 - 3.10 10*3/mm3    Monocytes, Absolute 0.88 0.10 - 0.90 10*3/mm3    Eosinophils, Absolute 0.27 0.00 - 0.40 10*3/mm3    Basophils, Absolute 0.03 0.00 - 0.20 10*3/mm3    Immature Grans, Absolute 0.04 0.00 - 0.05 10*3/mm3    nRBC 0.0 0.0 - 0.2 /100 WBC   Comprehensive Metabolic Panel    Specimen: Blood   Result Value Ref Range    Glucose 116 (H) 65 - 99 mg/dL    BUN 15 8 - 23 mg/dL    Creatinine 0.85 0.57 - 1.00 mg/dL    Sodium 137 136 - 145 mmol/L    Potassium 3.9 3.5 - 5.2 mmol/L    Chloride 106 98 - 107 mmol/L    CO2 21.0 (L) 22.0 - 29.0 mmol/L    Calcium 8.2 (L) 8.6 - 10.5 mg/dL    Total Protein 6.4 6.0 - 8.5 g/dL    Albumin 3.42 (L) 3.50 - 5.20 g/dL    ALT (SGPT) 18 1 - 33 U/L    AST (SGOT) 24 1 - 32 U/L    Alkaline Phosphatase 103 39 - 117 U/L     Total Bilirubin 0.3 0.2 - 1.2 mg/dL    eGFR Non African Amer 66 >60 mL/min/1.73    Globulin 3.0 gm/dL    A/G Ratio 1.1 g/dL    BUN/Creatinine Ratio 17.6 7.0 - 25.0    Anion Gap 10.0 5.0 - 15.0 mmol/L   Comprehensive Metabolic Panel    Specimen: Blood   Result Value Ref Range    Glucose 79 65 - 99 mg/dL    BUN 11 8 - 23 mg/dL    Creatinine 0.79 0.57 - 1.00 mg/dL    Sodium 138 136 - 145 mmol/L    Potassium 3.7 3.5 - 5.2 mmol/L    Chloride 107 98 - 107 mmol/L    CO2 17.8 (L) 22.0 - 29.0 mmol/L    Calcium 8.3 (L) 8.6 - 10.5 mg/dL    Total Protein 6.1 6.0 - 8.5 g/dL    Albumin 3.16 (L) 3.50 - 5.20 g/dL    ALT (SGPT) 14 1 - 33 U/L    AST (SGOT) 14 1 - 32 U/L    Alkaline Phosphatase 91 39 - 117 U/L    Total Bilirubin 0.2 0.2 - 1.2 mg/dL    eGFR Non African Amer 71 >60 mL/min/1.73    Globulin 2.9 gm/dL    A/G Ratio 1.1 g/dL    BUN/Creatinine Ratio 13.9 7.0 - 25.0    Anion Gap 13.2 5.0 - 15.0 mmol/L   Magnesium    Specimen: Blood   Result Value Ref Range    Magnesium 1.9 1.6 - 2.4 mg/dL   Phosphorus    Specimen: Blood   Result Value Ref Range    Phosphorus 2.0 (L) 2.5 - 4.5 mg/dL   aPTT    Specimen: Blood   Result Value Ref Range    PTT 30.2 23.8 - 36.1 seconds   Protime-INR    Specimen: Blood   Result Value Ref Range    Protime 13.6 11.0 - 15.4 Seconds    INR 0.99 0.90 - 1.10   CBC Auto Differential    Specimen: Blood   Result Value Ref Range    WBC 6.48 3.40 - 10.80 10*3/mm3    RBC 4.20 3.77 - 5.28 10*6/mm3    Hemoglobin 11.4 (L) 12.0 - 15.9 g/dL    Hematocrit 38.1 34.0 - 46.6 %    MCV 90.7 79.0 - 97.0 fL    MCH 27.1 26.6 - 33.0 pg    MCHC 29.9 (L) 31.5 - 35.7 g/dL    RDW 13.2 12.3 - 15.4 %    RDW-SD 43.8 37.0 - 54.0 fl    MPV 11.5 6.0 - 12.0 fL    Platelets 189 140 - 450 10*3/mm3    Neutrophil % 58.7 42.7 - 76.0 %    Lymphocyte % 24.7 19.6 - 45.3 %    Monocyte % 8.8 5.0 - 12.0 %    Eosinophil % 6.0 0.3 - 6.2 %    Basophil % 0.9 0.0 - 1.5 %    Immature Grans % 0.9 (H) 0.0 - 0.5 %    Neutrophils, Absolute 3.80 1.70 -  7.00 10*3/mm3    Lymphocytes, Absolute 1.60 0.70 - 3.10 10*3/mm3    Monocytes, Absolute 0.57 0.10 - 0.90 10*3/mm3    Eosinophils, Absolute 0.39 0.00 - 0.40 10*3/mm3    Basophils, Absolute 0.06 0.00 - 0.20 10*3/mm3    Immature Grans, Absolute 0.06 (H) 0.00 - 0.05 10*3/mm3    nRBC 0.0 0.0 - 0.2 /100 WBC   Comprehensive Metabolic Panel    Specimen: Blood   Result Value Ref Range    Glucose 88 65 - 99 mg/dL    BUN 7 (L) 8 - 23 mg/dL    Creatinine 0.76 0.57 - 1.00 mg/dL    Sodium 144 136 - 145 mmol/L    Potassium 3.9 3.5 - 5.2 mmol/L    Chloride 110 (H) 98 - 107 mmol/L    CO2 22.0 22.0 - 29.0 mmol/L    Calcium 7.9 (L) 8.6 - 10.5 mg/dL    Total Protein 6.2 6.0 - 8.5 g/dL    Albumin 3.29 (L) 3.50 - 5.20 g/dL    ALT (SGPT) 18 1 - 33 U/L    AST (SGOT) 15 1 - 32 U/L    Alkaline Phosphatase 86 39 - 117 U/L    Total Bilirubin 0.2 0.2 - 1.2 mg/dL    eGFR Non African Amer 75 >60 mL/min/1.73    Globulin 2.9 gm/dL    A/G Ratio 1.1 g/dL    BUN/Creatinine Ratio 9.2 7.0 - 25.0    Anion Gap 12.0 5.0 - 15.0 mmol/L   Phosphorus    Specimen: Blood   Result Value Ref Range    Phosphorus 2.9 2.5 - 4.5 mg/dL   Magnesium    Specimen: Blood   Result Value Ref Range    Magnesium 1.8 1.6 - 2.4 mg/dL   CBC Auto Differential    Specimen: Blood   Result Value Ref Range    WBC 8.88 3.40 - 10.80 10*3/mm3    RBC 4.14 3.77 - 5.28 10*6/mm3    Hemoglobin 11.3 (L) 12.0 - 15.9 g/dL    Hematocrit 36.7 34.0 - 46.6 %    MCV 88.6 79.0 - 97.0 fL    MCH 27.3 26.6 - 33.0 pg    MCHC 30.8 (L) 31.5 - 35.7 g/dL    RDW 13.4 12.3 - 15.4 %    RDW-SD 43.6 37.0 - 54.0 fl    MPV 11.0 6.0 - 12.0 fL    Platelets 188 140 - 450 10*3/mm3    Neutrophil % 72.0 42.7 - 76.0 %    Lymphocyte % 15.7 (L) 19.6 - 45.3 %    Monocyte % 9.3 5.0 - 12.0 %    Eosinophil % 1.4 0.3 - 6.2 %    Basophil % 0.6 0.0 - 1.5 %    Immature Grans % 1.0 (H) 0.0 - 0.5 %    Neutrophils, Absolute 6.40 1.70 - 7.00 10*3/mm3    Lymphocytes, Absolute 1.39 0.70 - 3.10 10*3/mm3    Monocytes, Absolute 0.83 0.10 -  0.90 10*3/mm3    Eosinophils, Absolute 0.12 0.00 - 0.40 10*3/mm3    Basophils, Absolute 0.05 0.00 - 0.20 10*3/mm3    Immature Grans, Absolute 0.09 (H) 0.00 - 0.05 10*3/mm3    nRBC 0.0 0.0 - 0.2 /100 WBC   Protime-INR    Specimen: Blood   Result Value Ref Range    Protime 13.7 11.0 - 15.4 Seconds    INR 1.00 0.90 - 1.10   aPTT    Specimen: Blood   Result Value Ref Range    PTT 23.8 23.8 - 36.1 seconds   CBC Auto Differential    Specimen: Blood   Result Value Ref Range    WBC 9.32 3.40 - 10.80 10*3/mm3    RBC 3.97 3.77 - 5.28 10*6/mm3    Hemoglobin 10.8 (L) 12.0 - 15.9 g/dL    Hematocrit 35.9 34.0 - 46.6 %    MCV 90.4 79.0 - 97.0 fL    MCH 27.2 26.6 - 33.0 pg    MCHC 30.1 (L) 31.5 - 35.7 g/dL    RDW 13.6 12.3 - 15.4 %    RDW-SD 45.2 37.0 - 54.0 fl    MPV 11.4 6.0 - 12.0 fL    Platelets 181 140 - 450 10*3/mm3    Neutrophil % 71.4 42.7 - 76.0 %    Lymphocyte % 13.8 (L) 19.6 - 45.3 %    Monocyte % 10.4 5.0 - 12.0 %    Eosinophil % 2.8 0.3 - 6.2 %    Basophil % 0.6 0.0 - 1.5 %    Immature Grans % 1.0 (H) 0.0 - 0.5 %    Neutrophils, Absolute 6.65 1.70 - 7.00 10*3/mm3    Lymphocytes, Absolute 1.29 0.70 - 3.10 10*3/mm3    Monocytes, Absolute 0.97 (H) 0.10 - 0.90 10*3/mm3    Eosinophils, Absolute 0.26 0.00 - 0.40 10*3/mm3    Basophils, Absolute 0.06 0.00 - 0.20 10*3/mm3    Immature Grans, Absolute 0.09 (H) 0.00 - 0.05 10*3/mm3    nRBC 0.0 0.0 - 0.2 /100 WBC   aPTT    Specimen: Blood   Result Value Ref Range    PTT 43.9 (H) 23.8 - 36.1 seconds   Comprehensive Metabolic Panel    Specimen: Blood   Result Value Ref Range    Glucose 107 (H) 65 - 99 mg/dL    BUN 6 (L) 8 - 23 mg/dL    Creatinine 0.78 0.57 - 1.00 mg/dL    Sodium 140 136 - 145 mmol/L    Potassium 3.6 3.5 - 5.2 mmol/L    Chloride 108 (H) 98 - 107 mmol/L    CO2 22.3 22.0 - 29.0 mmol/L    Calcium 8.1 (L) 8.6 - 10.5 mg/dL    Total Protein 6.0 6.0 - 8.5 g/dL    Albumin 3.12 (L) 3.50 - 5.20 g/dL    ALT (SGPT) 14 1 - 33 U/L    AST (SGOT) 13 1 - 32 U/L    Alkaline Phosphatase  79 39 - 117 U/L    Total Bilirubin 0.3 0.2 - 1.2 mg/dL    eGFR Non African Amer 72 >60 mL/min/1.73    Globulin 2.9 gm/dL    A/G Ratio 1.1 g/dL    BUN/Creatinine Ratio 7.7 7.0 - 25.0    Anion Gap 9.7 5.0 - 15.0 mmol/L   Magnesium    Specimen: Blood   Result Value Ref Range    Magnesium 1.8 1.6 - 2.4 mg/dL   Phosphorus    Specimen: Blood   Result Value Ref Range    Phosphorus 2.1 (L) 2.5 - 4.5 mg/dL   CBC Auto Differential    Specimen: Blood   Result Value Ref Range    WBC 8.34 3.40 - 10.80 10*3/mm3    RBC 3.88 3.77 - 5.28 10*6/mm3    Hemoglobin 10.6 (L) 12.0 - 15.9 g/dL    Hematocrit 35.0 34.0 - 46.6 %    MCV 90.2 79.0 - 97.0 fL    MCH 27.3 26.6 - 33.0 pg    MCHC 30.3 (L) 31.5 - 35.7 g/dL    RDW 13.6 12.3 - 15.4 %    RDW-SD 44.6 37.0 - 54.0 fl    MPV 11.1 6.0 - 12.0 fL    Platelets 175 140 - 450 10*3/mm3    Neutrophil % 67.0 42.7 - 76.0 %    Lymphocyte % 17.7 (L) 19.6 - 45.3 %    Monocyte % 9.7 5.0 - 12.0 %    Eosinophil % 4.3 0.3 - 6.2 %    Basophil % 0.5 0.0 - 1.5 %    Immature Grans % 0.8 (H) 0.0 - 0.5 %    Neutrophils, Absolute 5.58 1.70 - 7.00 10*3/mm3    Lymphocytes, Absolute 1.48 0.70 - 3.10 10*3/mm3    Monocytes, Absolute 0.81 0.10 - 0.90 10*3/mm3    Eosinophils, Absolute 0.36 0.00 - 0.40 10*3/mm3    Basophils, Absolute 0.04 0.00 - 0.20 10*3/mm3    Immature Grans, Absolute 0.07 (H) 0.00 - 0.05 10*3/mm3    nRBC 0.0 0.0 - 0.2 /100 WBC   aPTT    Specimen: Blood   Result Value Ref Range    PTT 36.5 (H) 23.8 - 36.1 seconds   aPTT    Specimen: Blood   Result Value Ref Range    PTT 53.5 (H) 23.8 - 36.1 seconds   C-reactive Protein    Specimen: Blood   Result Value Ref Range    C-Reactive Protein 5.63 (H) 0.00 - 0.50 mg/dL   Comprehensive Metabolic Panel    Specimen: Blood   Result Value Ref Range    Glucose 106 (H) 65 - 99 mg/dL    BUN 6 (L) 8 - 23 mg/dL    Creatinine 0.71 0.57 - 1.00 mg/dL    Sodium 140 136 - 145 mmol/L    Potassium 3.2 (L) 3.5 - 5.2 mmol/L    Chloride 106 98 - 107 mmol/L    CO2 22.5 22.0 - 29.0  mmol/L    Calcium 8.2 (L) 8.6 - 10.5 mg/dL    Total Protein 6.0 6.0 - 8.5 g/dL    Albumin 3.12 (L) 3.50 - 5.20 g/dL    ALT (SGPT) 13 1 - 33 U/L    AST (SGOT) 13 1 - 32 U/L    Alkaline Phosphatase 79 39 - 117 U/L    Total Bilirubin 0.3 0.2 - 1.2 mg/dL    eGFR Non African Amer 81 >60 mL/min/1.73    Globulin 2.9 gm/dL    A/G Ratio 1.1 g/dL    BUN/Creatinine Ratio 8.5 7.0 - 25.0    Anion Gap 11.5 5.0 - 15.0 mmol/L   Phosphorus    Specimen: Blood   Result Value Ref Range    Phosphorus 2.4 (L) 2.5 - 4.5 mg/dL   Magnesium    Specimen: Blood   Result Value Ref Range    Magnesium 1.7 1.6 - 2.4 mg/dL   C-reactive Protein    Specimen: Blood   Result Value Ref Range    C-Reactive Protein 10.61 (H) 0.00 - 0.50 mg/dL   aPTT    Specimen: Blood   Result Value Ref Range    PTT >100.0 (C) 23.8 - 36.1 seconds   aPTT    Specimen: Blood   Result Value Ref Range    PTT 82.8 (H) 23.8 - 36.1 seconds   aPTT    Specimen: Blood   Result Value Ref Range    PTT 44.5 (H) 23.8 - 36.1 seconds   aPTT    Specimen: Blood   Result Value Ref Range    PTT 87.6 (H) 23.8 - 36.1 seconds   Comprehensive Metabolic Panel    Specimen: Blood   Result Value Ref Range    Glucose 140 (H) 65 - 99 mg/dL    BUN 9 8 - 23 mg/dL    Creatinine 0.96 0.57 - 1.00 mg/dL    Sodium 140 136 - 145 mmol/L    Potassium 4.2 3.5 - 5.2 mmol/L    Chloride 106 98 - 107 mmol/L    CO2 20.7 (L) 22.0 - 29.0 mmol/L    Calcium 9.2 8.6 - 10.5 mg/dL    Total Protein 6.6 6.0 - 8.5 g/dL    Albumin 3.15 (L) 3.50 - 5.20 g/dL    ALT (SGPT) 12 1 - 33 U/L    AST (SGOT) 14 1 - 32 U/L    Alkaline Phosphatase 83 39 - 117 U/L    Total Bilirubin 0.3 0.2 - 1.2 mg/dL    eGFR Non African Amer 57 (L) >60 mL/min/1.73    Globulin 3.5 gm/dL    A/G Ratio 0.9 g/dL    BUN/Creatinine Ratio 9.4 7.0 - 25.0    Anion Gap 13.3 5.0 - 15.0 mmol/L   Magnesium    Specimen: Blood   Result Value Ref Range    Magnesium 2.3 1.6 - 2.4 mg/dL   Phosphorus    Specimen: Blood   Result Value Ref Range    Phosphorus 3.5 2.5 - 4.5 mg/dL    C-reactive Protein    Specimen: Blood   Result Value Ref Range    C-Reactive Protein 10.90 (H) 0.00 - 0.50 mg/dL   aPTT    Specimen: Blood   Result Value Ref Range    PTT 68.1 (H) 23.8 - 36.1 seconds   CBC Auto Differential    Specimen: Blood   Result Value Ref Range    WBC 7.30 3.40 - 10.80 10*3/mm3    RBC 3.76 (L) 3.77 - 5.28 10*6/mm3    Hemoglobin 10.2 (L) 12.0 - 15.9 g/dL    Hematocrit 33.2 (L) 34.0 - 46.6 %    MCV 88.3 79.0 - 97.0 fL    MCH 27.1 26.6 - 33.0 pg    MCHC 30.7 (L) 31.5 - 35.7 g/dL    RDW 13.6 12.3 - 15.4 %    RDW-SD 44.2 37.0 - 54.0 fl    MPV 11.6 6.0 - 12.0 fL    Platelets 213 140 - 450 10*3/mm3    Neutrophil % 63.9 42.7 - 76.0 %    Lymphocyte % 15.5 (L) 19.6 - 45.3 %    Monocyte % 12.3 (H) 5.0 - 12.0 %    Eosinophil % 6.4 (H) 0.3 - 6.2 %    Basophil % 0.5 0.0 - 1.5 %    Immature Grans % 1.4 (H) 0.0 - 0.5 %    Neutrophils, Absolute 4.66 1.70 - 7.00 10*3/mm3    Lymphocytes, Absolute 1.13 0.70 - 3.10 10*3/mm3    Monocytes, Absolute 0.90 0.10 - 0.90 10*3/mm3    Eosinophils, Absolute 0.47 (H) 0.00 - 0.40 10*3/mm3    Basophils, Absolute 0.04 0.00 - 0.20 10*3/mm3    Immature Grans, Absolute 0.10 (H) 0.00 - 0.05 10*3/mm3    nRBC 0.0 0.0 - 0.2 /100 WBC   aPTT    Specimen: Blood   Result Value Ref Range    PTT 58.2 (H) 23.8 - 36.1 seconds   Basic Metabolic Panel    Specimen: Blood   Result Value Ref Range    Glucose 125 (H) 65 - 99 mg/dL    BUN 9 8 - 23 mg/dL    Creatinine 0.87 0.57 - 1.00 mg/dL    Sodium 140 136 - 145 mmol/L    Potassium 3.9 3.5 - 5.2 mmol/L    Chloride 105 98 - 107 mmol/L    CO2 22.4 22.0 - 29.0 mmol/L    Calcium 9.2 8.6 - 10.5 mg/dL    eGFR Non African Amer 64 >60 mL/min/1.73    BUN/Creatinine Ratio 10.3 7.0 - 25.0    Anion Gap 12.6 5.0 - 15.0 mmol/L   aPTT    Specimen: Blood   Result Value Ref Range    PTT 63.5 (H) 23.8 - 36.1 seconds   CBC Auto Differential    Specimen: Blood   Result Value Ref Range    WBC 7.72 3.40 - 10.80 10*3/mm3    RBC 3.82 3.77 - 5.28 10*6/mm3    Hemoglobin  10.4 (L) 12.0 - 15.9 g/dL    Hematocrit 34.1 34.0 - 46.6 %    MCV 89.3 79.0 - 97.0 fL    MCH 27.2 26.6 - 33.0 pg    MCHC 30.5 (L) 31.5 - 35.7 g/dL    RDW 13.7 12.3 - 15.4 %    RDW-SD 44.7 37.0 - 54.0 fl    MPV 11.5 6.0 - 12.0 fL    Platelets 185 140 - 450 10*3/mm3    Neutrophil % 60.4 42.7 - 76.0 %    Lymphocyte % 18.0 (L) 19.6 - 45.3 %    Monocyte % 12.0 5.0 - 12.0 %    Eosinophil % 7.1 (H) 0.3 - 6.2 %    Basophil % 0.6 0.0 - 1.5 %    Immature Grans % 1.9 (H) 0.0 - 0.5 %    Neutrophils, Absolute 4.65 1.70 - 7.00 10*3/mm3    Lymphocytes, Absolute 1.39 0.70 - 3.10 10*3/mm3    Monocytes, Absolute 0.93 (H) 0.10 - 0.90 10*3/mm3    Eosinophils, Absolute 0.55 (H) 0.00 - 0.40 10*3/mm3    Basophils, Absolute 0.05 0.00 - 0.20 10*3/mm3    Immature Grans, Absolute 0.15 (H) 0.00 - 0.05 10*3/mm3    nRBC 0.0 0.0 - 0.2 /100 WBC   POC Glucose Once    Specimen: Blood   Result Value Ref Range    Glucose 96 70 - 130 mg/dL   POC Glucose Once    Specimen: Blood   Result Value Ref Range    Glucose 99 70 - 130 mg/dL   POC Glucose Once    Specimen: Blood   Result Value Ref Range    Glucose 101 70 - 130 mg/dL   POC Glucose Once    Specimen: Blood   Result Value Ref Range    Glucose 94 70 - 130 mg/dL   POC Glucose Once    Specimen: Blood   Result Value Ref Range    Glucose 87 70 - 130 mg/dL   POC Glucose Once    Specimen: Blood   Result Value Ref Range    Glucose 80 70 - 130 mg/dL   POC Glucose Once    Specimen: Blood   Result Value Ref Range    Glucose 82 70 - 130 mg/dL   POC Glucose Once    Specimen: Blood   Result Value Ref Range    Glucose 79 70 - 130 mg/dL   POC Glucose Once    Specimen: Blood   Result Value Ref Range    Glucose 72 70 - 130 mg/dL   POC Glucose Once    Specimen: Blood   Result Value Ref Range    Glucose 69 (L) 70 - 130 mg/dL   POC Glucose Once    Specimen: Blood   Result Value Ref Range    Glucose 308 (H) 70 - 130 mg/dL   POC Glucose Once    Specimen: Blood   Result Value Ref Range    Glucose 138 (H) 70 - 130 mg/dL   POC  Glucose Once    Specimen: Blood   Result Value Ref Range    Glucose 92 70 - 130 mg/dL   POC Glucose Once    Specimen: Blood   Result Value Ref Range    Glucose 96 70 - 130 mg/dL   POC Glucose Once    Specimen: Blood   Result Value Ref Range    Glucose 149 (H) 70 - 130 mg/dL   POC Glucose Once    Specimen: Blood   Result Value Ref Range    Glucose 122 70 - 130 mg/dL   POC Glucose Once    Specimen: Blood   Result Value Ref Range    Glucose 148 (H) 70 - 130 mg/dL   POC Glucose Once    Specimen: Blood   Result Value Ref Range    Glucose 122 70 - 130 mg/dL   POC Glucose Once    Specimen: Blood   Result Value Ref Range    Glucose 109 70 - 130 mg/dL   POC Glucose Once    Specimen: Blood   Result Value Ref Range    Glucose 115 70 - 130 mg/dL   POC Glucose Once    Specimen: Blood   Result Value Ref Range    Glucose 134 (H) 70 - 130 mg/dL   POC Glucose Once    Specimen: Blood   Result Value Ref Range    Glucose 99 70 - 130 mg/dL   POC Glucose Once    Specimen: Blood   Result Value Ref Range    Glucose 148 (H) 70 - 130 mg/dL   POC Glucose Once    Specimen: Blood   Result Value Ref Range    Glucose 114 70 - 130 mg/dL   POC Glucose Once    Specimen: Blood   Result Value Ref Range    Glucose 180 (H) 70 - 130 mg/dL   POC Glucose Once    Specimen: Blood   Result Value Ref Range    Glucose 109 70 - 130 mg/dL   POC Glucose Once    Specimen: Blood   Result Value Ref Range    Glucose 187 (H) 70 - 130 mg/dL   POC Glucose Once    Specimen: Blood   Result Value Ref Range    Glucose 113 70 - 130 mg/dL   POC Glucose Once    Specimen: Blood   Result Value Ref Range    Glucose 179 (H) 70 - 130 mg/dL   POC Glucose Once    Specimen: Blood   Result Value Ref Range    Glucose 107 70 - 130 mg/dL   POC Glucose Once    Specimen: Blood   Result Value Ref Range    Glucose 153 (H) 70 - 130 mg/dL   Light Blue Top   Result Value Ref Range    Extra Tube hold for add-on    Green Top (Gel)   Result Value Ref Range    Extra Tube Hold for add-ons.    Lavender  Top   Result Value Ref Range    Extra Tube hold for add-on    Gold Top - SST   Result Value Ref Range    Extra Tube Hold for add-ons.        Objective   Physical Exam     GENERAL APPEARANCE: Well developed, well nourished, alert and cooperative, and appears to be in no acute distress.    HEAD: normocephalic. Atraumatic.    EYES: PERRL, EOMI. Vision is grossly intact.    THROAT: Oral cavity and pharynx normal. No inflammation, swelling, exudate, or lesions.     NECK: Neck supple.  No thyromegaly.    CARDIAC: Normal S1 and S2. No S3, S4 or murmurs. Rhythm is regular.     RESPIRATORY:Bilateral air entry positive. Bilateral diminished breath sounds. No wheezing, crackles or rhonchi noted.    GI: Positive bowel sounds. Soft, nondistended, nontender.     MUSCULOSKELETAL: No significant deformity or joint abnormality. No edema. Peripheral pulses intact. No varicosities.    NEUROLOGICAL: Strength and sensation symmetric and intact throughout.     PSYCHIATRIC: The mental examination revealed the patient was oriented to person, place, and time.       Assessment/Plan     Centrilobular Emphysema:  -Continue duonebs as needed.  -Continue Breo once daily.  Refills sent.  -Continue Spiriva once daily.  Refills sent.  -Continue supplemental oxygen at needed.    -PFT in 2018 shows a severe obstruction.  As part of her preoperative assessment we will complete a spirometry in office today.  -Spirometry in office showed an FEV1/FVC of 81% and FEV1 of 49%.  A severe restriction was noted.  Her severe restriction is likely related to her umbilical hernia.        Continue antibiotics as prescribed by her PCP.    OSWALDO:  noncompliant with autopap.  -Continue use of supplemental oxygen at this time.  -Patient's Body mass index is 32.49 kg/m². BMI is above normal parameters. Recommendations include: exercise counseling and nutrition counseling.  - Patient was educated on positive airway pressure treatment.  As per CMS guidelines, more than 4  hours on 70% of observed nights is considered adherence. Patient was strongly encouraged to use CPAP as much as possible during sleep as more CPAP use is equal to more benefit. Use of heated humidification in positive airway pressure treatment to improve the adherence to the device.  In case of claustrophobia, we will provide the patient cognitive behavioral therapy and desensitization. Oral appliances use will be discussed with the patient in case of mild to moderate sleep apnea or if the patient with severe disease fail positive airway pressure treatment.       The patient was extensively educated on the consequences of untreated obstructive sleep apnea namely cardiovascular/metabolic disorder, neurocognitive deficit, daytime sleepiness, motor vehicle accidents, depression, mood disorders and reduced quality of life.  At the end of conversation, the patient voices understanding of the disease process and treatment modality.  Patient also understands the risk of untreated obstructive sleep apnea and benefit benefits of the treatment.    Counseling time was greater than 10 minutes.        Influenza vaccine was given in office today.      ICD-10-CM ICD-9-CM   1. Centrilobular emphysema (CMS/HCC)  J43.2 492.8   2. OSWALDO (obstructive sleep apnea)  G47.33 327.23   3. Obesity (BMI 30-39.9)  E66.9 278.00       Return in about 6 months (around 4/21/2021).

## 2020-12-30 ENCOUNTER — OFFICE VISIT (OUTPATIENT)
Dept: CARDIOLOGY | Facility: CLINIC | Age: 75
End: 2020-12-30

## 2020-12-30 VITALS
SYSTOLIC BLOOD PRESSURE: 165 MMHG | TEMPERATURE: 98.2 F | DIASTOLIC BLOOD PRESSURE: 76 MMHG | BODY MASS INDEX: 35.49 KG/M2 | WEIGHT: 239.6 LBS | HEIGHT: 69 IN | HEART RATE: 62 BPM

## 2020-12-30 DIAGNOSIS — I50.32 CHRONIC DIASTOLIC HEART FAILURE (HCC): ICD-10-CM

## 2020-12-30 DIAGNOSIS — E11.9 TYPE 2 DIABETES MELLITUS WITHOUT COMPLICATION, WITHOUT LONG-TERM CURRENT USE OF INSULIN (HCC): ICD-10-CM

## 2020-12-30 DIAGNOSIS — R07.2 PRECORDIAL PAIN: Primary | ICD-10-CM

## 2020-12-30 PROCEDURE — 99213 OFFICE O/P EST LOW 20 MIN: CPT | Performed by: PHYSICIAN ASSISTANT

## 2020-12-30 PROCEDURE — 93000 ELECTROCARDIOGRAM COMPLETE: CPT | Performed by: PHYSICIAN ASSISTANT

## 2020-12-30 NOTE — PROGRESS NOTES
Niki Antony APRN  Mayra Hays  1945 12/30/2020    Patient Active Problem List   Diagnosis   • Chronic diastolic heart failure (CMS/HCC)   • Elevated troponin   • Chest pain in adult   • Essential hypertension   • Type 2 diabetes mellitus (CMS/HCC)   • Abnormal nuclear stress test with moderate to large size anteroapical and lateral wall myocardial ischemia.   • Acute renal insufficiency   • Paroxysmal atrial fibrillation (CMS/HCC)   • Chest pain   • Ventral hernia without obstruction or gangrene   • Chronic obstructive pulmonary disease (CMS/HCC)       Dear Niki Antony APRN:    Subjective     History of Present Illness:    Chief Complaint   Patient presents with   • Follow-up     routine   • Med Management       Mayra Hays is a pleasant 75 y.o. female with a past medical history significant for paroxysmal atrial fibrillation anticoagulated with Eliquis.  She is here for a regular cardiology follow-up.    Patient reports she has been having chest pains somewhat frequently averaging once or twice weekly.  She does believe that this is related to her GERD as she will drink a carbonated drink and after belching the pain resolves.  She does describe this pain as a throb and reports that typically last 5 to 10 minutes in duration each time.  He does report that this chest pain does not come on with physical exertion and appears to me to be coming on at random.  She does have chronic shortness of breath from COPD that does require breathing treatments but she denies any recent worsening of this.    No Known Allergies:      Current Outpatient Medications:   •  albuterol sulfate  (90 Base) MCG/ACT inhaler, Inhale 2 puffs Every 4 (Four) Hours As Needed for Wheezing., Disp: , Rfl:   •  apixaban (ELIQUIS) 5 MG tablet tablet, Take 5 mg by mouth 2 (Two) Times a Day. Pt and daughter report she takes Eliquis but cannot find it filled any more recently than Oct 2019 at [facility] for 30 days supply.,  Disp: , Rfl:   •  atorvastatin (LIPITOR) 20 MG tablet, Take 20 mg by mouth Daily., Disp: , Rfl:   •  Breo Ellipta 100-25 MCG/INH inhaler, Inhale 1 puff by mouth once daily., Disp: 60 each, Rfl: 6  •  bumetanide (BUMEX) 1 MG tablet, Take 1 mg by mouth Daily As Needed (swelling)., Disp: , Rfl:   •  ferrous sulfate 325 (65 FE) MG tablet, Take 325 mg by mouth Daily With Breakfast., Disp: , Rfl:   •  hydrOXYzine pamoate (VISTARIL) 25 MG capsule, Take 25 mg by mouth Every Night., Disp: , Rfl:   •  loratadine (CLARITIN) 10 MG tablet, Take 10 mg by mouth Daily As Needed for Allergies., Disp: , Rfl:   •  metoprolol tartrate (LOPRESSOR) 50 MG tablet, Take 0.5 tablets by mouth Daily., Disp: , Rfl:   •  O2 (OXYGEN), Inhale 2 L/min As Needed., Disp: , Rfl:   •  pantoprazole (PROTONIX) 40 MG EC tablet, Take 40 mg by mouth Daily As Needed (acid reflux)., Disp: , Rfl:   •  potassium chloride (K-DUR,KLOR-CON) 20 MEQ CR tablet, Take 20 mEq by mouth Daily., Disp: , Rfl:   •  tiotropium bromide monohydrate (SPIRIVA RESPIMAT) 2.5 MCG/ACT aerosol solution inhaler, Inhale 2 puffs Daily., Disp: 4 g, Rfl: 6    The following portions of the patient's history were reviewed and updated as appropriate: allergies, current medications, past family history, past medical history, past social history, past surgical history and problem list.    Social History     Tobacco Use   • Smoking status: Former Smoker     Packs/day: 3.00     Types: Cigarettes     Quit date:      Years since quittin.0   • Smokeless tobacco: Never Used   Substance Use Topics   • Alcohol use: No   • Drug use: No       Review of Systems   Constitution: Negative for malaise/fatigue.   Cardiovascular: Positive for chest pain and dyspnea on exertion. Negative for irregular heartbeat.   Respiratory: Positive for shortness of breath. Negative for cough.    Hematologic/Lymphatic: Negative for bleeding problem. Does not bruise/bleed easily.   Gastrointestinal: Negative for nausea  "and vomiting.   Neurological: Negative for weakness.       Objective   Vitals:    12/30/20 1020   BP: 165/76   Pulse: 62   Temp: 98.2 °F (36.8 °C)   Weight: 109 kg (239 lb 9.6 oz)   Height: 175.3 cm (69\")     Body mass index is 35.38 kg/m².    Constitutional:       General: Not in acute distress.     Appearance: Healthy appearance. Well-developed and not in distress. Not diaphoretic.   Eyes:      Conjunctiva/sclera: Conjunctivae normal.      Pupils: Pupils are equal, round, and reactive to light.   HENT:      Head: Normocephalic and atraumatic.   Neck:      Vascular: No carotid bruit or JVD.   Pulmonary:      Effort: Pulmonary effort is normal. No respiratory distress.      Breath sounds: Normal breath sounds.   Cardiovascular:      Normal rate. Regular rhythm.   Skin:     General: Skin is cool.   Neurological:      Mental Status: Alert, oriented to person, place, and time and oriented to person, place and time.         Lab Results   Component Value Date     05/19/2020    K 3.9 05/19/2020     05/19/2020    CO2 22.4 05/19/2020    BUN 9 05/19/2020    CREATININE 0.87 05/19/2020    GLUCOSE 125 (H) 05/19/2020    CALCIUM 9.2 05/19/2020    AST 14 05/18/2020    ALT 12 05/18/2020    ALKPHOS 83 05/18/2020     Lab Results   Component Value Date    CKTOTAL 14 (L) 06/25/2018     Lab Results   Component Value Date    WBC 7.72 05/19/2020    HGB 10.4 (L) 05/19/2020    HCT 34.1 05/19/2020     05/19/2020     Lab Results   Component Value Date    INR 1.00 05/15/2020    INR 0.99 05/14/2020    INR 1.02 06/01/2017     Lab Results   Component Value Date    MG 2.3 05/18/2020     Lab Results   Component Value Date    TSH 2.670 12/18/2019    TRIG 119 12/13/2018    HDL 33 (L) 12/13/2018    LDL 72 12/13/2018      Lab Results   Component Value Date    BNP 29.0 02/01/2019       During this visit the following were done:  Labs Reviewed [x]    Labs Ordered []    Radiology Reports Reviewed [x]    Radiology Ordered []    PCP " Records Reviewed []    Referring Provider Records Reviewed []    ER Records Reviewed []    Hospital Records Reviewed []    History Obtained From Family []    Radiology Images Reviewed []    Other Reviewed []    Records Requested []         ECG 12 Lead    Date/Time: 12/30/2020 10:21 AM  Performed by: Darek Nelson PA-C  Authorized by: Darek Nelson PA-C   Comparison: compared with previous ECG   Similar to previous ECG  Rhythm: sinus rhythm  Conduction: non-specific intraventricular conduction delay    Clinical impression: non-specific ECG          Assessment/Plan    Diagnosis Plan   1. Precordial pain  Stress Test With Myocardial Perfusion    Adult Transthoracic Echo Complete W/ Cont if Necessary Per Protocol   2. Chronic diastolic heart failure (CMS/HCC)     3. Type 2 diabetes mellitus without complication, without long-term current use of insulin (CMS/HCC)          Recommendations:  1. Precordial pain  1. We will evaluate further with stress test and echocardiogram.  2. Continue metoprolol and Lipitor, and Eliquis.  2. Paroxysmal atrial fibrillation  1. Tending sinus rhythm today tolerating Eliquis well without any bleeding issues we will continue this.        Return in about 3 months (around 3/30/2021).    As always, I appreciate very much the opportunity to participate in the cardiovascular care of your patients.      With Best Regards,    Darek Nelson PA-C

## 2021-01-21 ENCOUNTER — APPOINTMENT (OUTPATIENT)
Dept: NUCLEAR MEDICINE | Facility: HOSPITAL | Age: 76
End: 2021-01-21

## 2021-01-21 ENCOUNTER — APPOINTMENT (OUTPATIENT)
Dept: CARDIOLOGY | Facility: HOSPITAL | Age: 76
End: 2021-01-21

## 2021-02-11 DIAGNOSIS — Z23 IMMUNIZATION DUE: ICD-10-CM

## 2021-03-03 ENCOUNTER — OFFICE VISIT (OUTPATIENT)
Dept: SURGERY | Facility: CLINIC | Age: 76
End: 2021-03-03

## 2021-03-03 VITALS — BODY MASS INDEX: 35.4 KG/M2 | WEIGHT: 239 LBS | HEIGHT: 69 IN

## 2021-03-03 DIAGNOSIS — K43.9 VENTRAL HERNIA WITHOUT OBSTRUCTION OR GANGRENE: Primary | ICD-10-CM

## 2021-03-03 PROCEDURE — 99212 OFFICE O/P EST SF 10 MIN: CPT | Performed by: SURGERY

## 2021-03-03 RX ORDER — AMOXICILLIN 250 MG
2 CAPSULE ORAL DAILY PRN
Qty: 30 TABLET | Refills: 1 | Status: SHIPPED | OUTPATIENT
Start: 2021-03-03 | End: 2022-03-03

## 2021-03-04 NOTE — PROGRESS NOTES
Subjective   Mayra Hays is a 75 y.o. female  is here today for follow-up.         Mayra Hays is a 75 y.o. female here for follow up after emergent ventral hernia repair secondary to incarceration and small bowel obstruction.  The patient is doing well since discharge and having regular bowel function.  Her incisions have healed well and she is back on her therapeutic anticoagulation.  No evidence of complication or recurrence.  The patient does have some constipation and left-sided pain secondary to this.        Assessment     Diagnoses and all orders for this visit:    1. Ventral hernia without obstruction or gangrene (Primary)    Other orders  -     sennosides-docusate (PERICOLACE) 8.6-50 MG per tablet; Take 2 tablets by mouth Daily As Needed for Constipation.  Dispense: 30 tablet; Refill: 1      Mayra Hays is a 75 y.o. female status post emergent laparoscopic ventral hernia repair secondary to small bowel obstruction.  The patient had an incarcerated piece of small bowel in the patient's incisional hernia just near her umbilicus from previous cholecystectomy.  She is doing well without evidence of recurrence or complication.  She will follow-up as in 2 weeks after bowel regimen for her current constipation that is causing left-sided pain.

## 2021-03-17 ENCOUNTER — OFFICE VISIT (OUTPATIENT)
Dept: SURGERY | Facility: CLINIC | Age: 76
End: 2021-03-17

## 2021-03-17 VITALS — BODY MASS INDEX: 35.4 KG/M2 | HEIGHT: 69 IN | WEIGHT: 239 LBS

## 2021-03-17 DIAGNOSIS — K43.9 VENTRAL HERNIA WITHOUT OBSTRUCTION OR GANGRENE: Primary | ICD-10-CM

## 2021-03-17 PROCEDURE — 99024 POSTOP FOLLOW-UP VISIT: CPT | Performed by: SURGERY

## 2021-03-18 ENCOUNTER — TELEPHONE (OUTPATIENT)
Dept: SURGERY | Facility: CLINIC | Age: 76
End: 2021-03-18

## 2021-03-18 NOTE — PROGRESS NOTES
Subjective   Mayra Hays is a 75 y.o. female  is here today for follow-up.         Mayra Hays is a 75 y.o. female here for follow up after emergent ventral hernia repair secondary to incarceration and small bowel obstruction.  The patient is doing well since discharge and having regular bowel function.  Her incisions have healed well and she is back on her therapeutic anticoagulation.  No evidence of complication or recurrence.  The patient does have some constipation and left-sided pain secondary to this but is improving with stool softeners..        Assessment     Diagnoses and all orders for this visit:    1. Ventral hernia without obstruction or gangrene (Primary)      Mayra Hays is a 75 y.o. female status post emergent laparoscopic ventral hernia repair secondary to small bowel obstruction.  The patient had an incarcerated piece of small bowel in the patient's incisional hernia just near her umbilicus from previous cholecystectomy.  She is doing well without evidence of recurrence or complication.  She will continue bowel regimen.

## 2021-03-18 NOTE — TELEPHONE ENCOUNTER
Talked with daughter jese and gave appointment  For Mayra with urology Tuesday 23 @[address] voiced  Understanding.

## 2021-03-23 ENCOUNTER — OFFICE VISIT (OUTPATIENT)
Dept: UROLOGY | Facility: CLINIC | Age: 76
End: 2021-03-23

## 2021-03-23 ENCOUNTER — HOSPITAL ENCOUNTER (OUTPATIENT)
Dept: GENERAL RADIOLOGY | Facility: HOSPITAL | Age: 76
Discharge: HOME | End: 2021-03-23
Admitting: NURSE PRACTITIONER

## 2021-03-23 VITALS — BODY MASS INDEX: 35.4 KG/M2 | TEMPERATURE: 98.2 F | HEIGHT: 69 IN | WEIGHT: 239 LBS

## 2021-03-23 DIAGNOSIS — R10.32 LEFT LOWER QUADRANT ABDOMINAL PAIN: ICD-10-CM

## 2021-03-23 DIAGNOSIS — N32.81 DETRUSOR INSTABILITY OF BLADDER: ICD-10-CM

## 2021-03-23 DIAGNOSIS — R10.9 ACUTE LEFT FLANK PAIN: ICD-10-CM

## 2021-03-23 DIAGNOSIS — R35.0 FREQUENCY OF MICTURITION: Primary | ICD-10-CM

## 2021-03-23 DIAGNOSIS — N39.0 URINARY TRACT INFECTION WITHOUT HEMATURIA, SITE UNSPECIFIED: ICD-10-CM

## 2021-03-23 LAB
BILIRUB BLD-MCNC: NEGATIVE MG/DL
CLARITY, POC: ABNORMAL
COLOR UR: YELLOW
GLUCOSE UR STRIP-MCNC: NEGATIVE MG/DL
KETONES UR QL: NEGATIVE
LEUKOCYTE EST, POC: NEGATIVE
NITRITE UR-MCNC: POSITIVE MG/ML
PH UR: 6 [PH] (ref 5–8)
PROT UR STRIP-MCNC: NEGATIVE MG/DL
RBC # UR STRIP: NEGATIVE /UL
SP GR UR: 1.01 (ref 1–1.03)
UROBILINOGEN UR QL: NORMAL

## 2021-03-23 PROCEDURE — 81003 URINALYSIS AUTO W/O SCOPE: CPT | Performed by: NURSE PRACTITIONER

## 2021-03-23 PROCEDURE — 87186 SC STD MICRODIL/AGAR DIL: CPT | Performed by: NURSE PRACTITIONER

## 2021-03-23 PROCEDURE — 74018 RADEX ABDOMEN 1 VIEW: CPT

## 2021-03-23 PROCEDURE — 74018 RADEX ABDOMEN 1 VIEW: CPT | Performed by: RADIOLOGY

## 2021-03-23 PROCEDURE — 99214 OFFICE O/P EST MOD 30 MIN: CPT | Performed by: NURSE PRACTITIONER

## 2021-03-23 PROCEDURE — 87086 URINE CULTURE/COLONY COUNT: CPT | Performed by: NURSE PRACTITIONER

## 2021-03-23 PROCEDURE — 87077 CULTURE AEROBIC IDENTIFY: CPT | Performed by: NURSE PRACTITIONER

## 2021-03-23 RX ORDER — SIMETHICONE 125 MG/1
CAPSULE, LIQUID FILLED ORAL TAKE AS DIRECTED
COMMUNITY
Start: 2021-03-05 | End: 2021-11-22 | Stop reason: HOSPADM

## 2021-03-23 RX ORDER — NITROFURANTOIN 25; 75 MG/1; MG/1
CAPSULE ORAL
Qty: 56 CAPSULE | Refills: 3 | Status: SHIPPED | OUTPATIENT
Start: 2021-03-23 | End: 2021-11-22 | Stop reason: HOSPADM

## 2021-03-23 RX ORDER — DOCUSATE SODIUM 100 MG
1 CAPSULE ORAL 2 TIMES DAILY
COMMUNITY
Start: 2021-03-05

## 2021-03-23 NOTE — PATIENT INSTRUCTIONS
Overactive Bladder, Adult    Overactive bladder refers to a condition in which a person has a sudden need to pass urine. The person may leak urine if he or she cannot get to the bathroom fast enough (urinary incontinence). A person with this condition may also wake up several times in the night to go to the bathroom.  Overactive bladder is associated with poor nerve signals between your bladder and your brain. Your bladder may get the signal to empty before it is full. You may also have very sensitive muscles that make your bladder squeeze too soon. These symptoms might interfere with daily work or social activities.  What are the causes?  This condition may be associated with or caused by:  · Urinary tract infection.  · Infection of nearby tissues, such as the prostate.  · Prostate enlargement.  · Surgery on the uterus or urethra.  · Bladder stones, inflammation, or tumors.  · Drinking too much caffeine or alcohol.  · Certain medicines, especially medicines that get rid of extra fluid in the body (diuretics).  · Muscle or nerve weakness, especially from:  ? A spinal cord injury.  ? Stroke.  ? Multiple sclerosis.  ? Parkinson's disease.  · Diabetes.  · Constipation.  What increases the risk?  You may be at greater risk for overactive bladder if you:  · Are an older adult.  · Smoke.  · Are going through menopause.  · Have prostate problems.  · Have a neurological disease, such as stroke, dementia, Parkinson's disease, or multiple sclerosis (MS).  · Eat or drink things that irritate the bladder. These include alcohol, spicy food, and caffeine.  · Are overweight or obese.  What are the signs or symptoms?  Symptoms of this condition include:  · Sudden, strong urge to urinate.  · Leaking urine.  · Urinating 8 or more times a day.  · Waking up to urinate 2 or more times a night.  How is this diagnosed?  Your health care provider may suspect overactive bladder based on your symptoms. He or she will diagnose this condition  by:  · A physical exam and medical history.  · Blood or urine tests. You might need bladder or urine tests to help determine what is causing your overactive bladder.  You might also need to see a health care provider who specializes in urinary tract problems (urologist).  How is this treated?  Treatment for overactive bladder depends on the cause of your condition and whether it is mild or severe. You can also make lifestyle changes at home. Options include:  · Bladder training. This may include:  ? Learning to control the urge to urinate by following a schedule that directs you to urinate at regular intervals (timed voiding).  ? Doing Kegel exercises to strengthen your pelvic floor muscles, which support your bladder. Toning these muscles can help you control urination, even if your bladder muscles are overactive.  · Special devices. This may include:  ? Biofeedback, which uses sensors to help you become aware of your body's signals.  ? Electrical stimulation, which uses electrodes placed inside the body (implanted) or outside the body. These electrodes send gentle pulses of electricity to strengthen the nerves or muscles that control the bladder.  ? Women may use a plastic device that fits into the vagina and supports the bladder (pessary).  · Medicines.  ? Antibiotics to treat bladder infection.  ? Antispasmodics to stop the bladder from releasing urine at the wrong time.  ? Tricyclic antidepressants to relax bladder muscles.  ? Injections of botulinum toxin type A directly into the bladder tissue to relax bladder muscles.  · Lifestyle changes. This may include:  ? Weight loss. Talk to your health care provider about weight loss methods that would work best for you.  ? Diet changes. This may include reducing how much alcohol and caffeine you consume, or drinking fluids at different times of the day.  ? Not smoking. Do not use any products that contain nicotine or tobacco, such as cigarettes and e-cigarettes. If  you need help quitting, ask your health care provider.  · Surgery.  ? A device may be implanted to help manage the nerve signals that control urination.  ? An electrode may be implanted to stimulate electrical signals in the bladder.  ? A procedure may be done to change the shape of the bladder. This is done only in very severe cases.  Follow these instructions at home:  Lifestyle  · Make any diet or lifestyle changes that are recommended by your health care provider. These may include:  ? Drinking less fluid or drinking fluids at different times of the day.  ? Cutting down on caffeine or alcohol.  ? Doing Kegel exercises.  ? Losing weight if needed.  ? Eating a healthy and balanced diet to prevent constipation. This may include:  § Eating foods that are high in fiber, such as fresh fruits and vegetables, whole grains, and beans.  § Limiting foods that are high in fat and processed sugars, such as fried and sweet foods.  General instructions  · Take over-the-counter and prescription medicines only as told by your health care provider.  · If you were prescribed an antibiotic medicine, take it as told by your health care provider. Do not stop taking the antibiotic even if you start to feel better.  · Use any implants or pessary as told by your health care provider.  · If needed, wear pads to absorb urine leakage.  · Keep a journal or log to track how much and when you drink and when you feel the need to urinate. This will help your health care provider monitor your condition.  · Keep all follow-up visits as told by your health care provider. This is important.  Contact a health care provider if:  · You have a fever.  · Your symptoms do not get better with treatment.  · Your pain and discomfort get worse.  · You have more frequent urges to urinate.  Get help right away if:  · You are not able to control your bladder.  Summary  · Overactive bladder refers to a condition in which a person has a sudden need to pass  urine.  · Several conditions may lead to an overactive bladder.  · Treatment for overactive bladder depends on the cause and severity of your condition.  · Follow your health care provider's instructions about lifestyle changes, doing Kegel exercises, keeping a journal, and taking medicines.  This information is not intended to replace advice given to you by your health care provider. Make sure you discuss any questions you have with your health care provider.  Document Revised: 04/09/2020 Document Reviewed: 01/03/2019  [employer] Patient Education © 2021 [employer]  Hematuria, Adult  Hematuria is blood in the urine. Blood may be visible in the urine, or it may be identified with a test. This condition can be caused by infections of the bladder, urethra, kidney, or prostate. Other possible causes include:  · Kidney stones.  · Cancer of the urinary tract.  · Too much calcium in the urine.  · Conditions that are passed from parent to child (inherited conditions).  · Exercise that requires a lot of energy.  Infections can usually be treated with medicine, and a kidney stone usually will pass through your urine. If neither of these is the cause of your hematuria, more tests may be needed to identify the cause of your symptoms.  It is very important to tell your health care provider about any blood in your urine, even if it is painless or the blood stops without treatment. Blood in the urine, when it happens and then stops and then happens again, can be a symptom of a very serious condition, including cancer. There is no pain in the initial stages of many urinary cancers.  Follow these instructions at home:  Medicines  · Take over-the-counter and prescription medicines only as told by your health care provider.  · If you were prescribed an antibiotic medicine, take it as told by your health care provider. Do not stop taking the antibiotic even if you start to feel better.  Eating and drinking  · Drink enough fluid to  keep your urine clear or pale yellow. It is recommended that you drink 3-4 quarts (2.8-3.8 L) a day. If you have been diagnosed with an infection, it is recommended that you drink cranberry juice in addition to large amounts of water.  · Avoid caffeine, tea, and carbonated beverages. These tend to irritate the bladder.  · Avoid alcohol because it may irritate the prostate (men).  General instructions  · If you have been diagnosed with a kidney stone, follow your health care provider's instructions about straining your urine to catch the stone.  · Empty your bladder often. Avoid holding urine for long periods of time.  · If you are female:  ? After a bowel movement, wipe from front to back and use each piece of toilet paper only once.  ? Empty your bladder before and after sex.  · Pay attention to any changes in your symptoms. Tell your health care provider about any changes or any new symptoms.  · It is your responsibility to get your test results. Ask your health care provider, or the department performing the test, when your results will be ready.  · Keep all follow-up visits as told by your health care provider. This is important.  Contact a health care provider if:  · You develop back pain.  · You have a fever.  · You have nausea or vomiting.  · Your symptoms do not improve after 3 days.  · Your symptoms get worse.  Get help right away if:  · You develop severe vomiting and are unable take medicine without vomiting.  · You develop severe pain in your back or abdomen even though you are taking medicine.  · You pass a large amount of blood in your urine.  · You pass blood clots in your urine.  · You feel very weak or like you might faint.  · You faint.  Summary  · Hematuria is blood in the urine. It has many possible causes.  · It is very important that you tell your health care provider about any blood in your urine, even if it is painless or the blood stops without treatment.  · Take over-the-counter and  prescription medicines only as told by your health care provider.  · Drink enough fluid to keep your urine clear or pale yellow.  This information is not intended to replace advice given to you by your health care provider. Make sure you discuss any questions you have with your health care provider.  Document Revised: 05/13/2020 Document Reviewed: 01/20/2018  [employer] Patient Education © 2021 [employer]

## 2021-03-23 NOTE — PROGRESS NOTES
"Chief Complaint  Blood in Urine/Recurrent UTI (New pt WITH GROSS HEMATURIA/ACUTE CYSTITIS)    Subjective          Mayra Hays presents to [facility]  History of Present Illness   Very Pleasant [place] patient evaluated in clinic today with numerous concerns including  Recurrent UTIs, Bilateral flank pain ( with left side greater than right side), pelvic/suprapubic discomfort, lower abdominal pain, lower back pain.   She has been referred to us by she PCP Ms. Niki Antony, ERASTO.  She is status post a recent  Hernia surgery repair in May of 2021, states her symptoms have worsened since after that. She is post 7 day therapy with amoxicillin 875 BID for URI,      Patient reports her symptoms have been ongoing \" for a While\". SHe denies any recent burning on urination, but reports urine frequency, urgency , Nocturia 3-4 times and  Numerous episodes of gross/microscopic hematuria on her prior urine samples.  She has a strong stream most of the times but feels like she does not empty. She reports many episodes of urinary incontinence.  She does not have any CVA tenderness, she denies N/V/D.  She has Episodes of constipation improved with daily MiraLAX intake, not PRN  Her urine dipstick today is  negative for Leukocyte Esterace, Positive NITRITES, it is negative for gross hematuria/ microscopic hematuria.  Her PVR is 23cc.     Hwe KUB today is unremarkable.    Objective   Vital Signs:   Temp 98.2 °F (36.8 °C)   Ht 175.3 cm (69.02\")   Wt 108 kg (239 lb)   BMI 35.27 kg/m²     Physical Exam  Constitutional:       General: She is in acute distress.      Appearance: She is well-developed. She is ill-appearing.   HENT:      Head: Normocephalic and atraumatic.   Eyes:      Pupils: Pupils are equal, round, and reactive to light.   Neck:      Thyroid: No thyromegaly.      Trachea: No tracheal deviation.   Cardiovascular:      Rate and Rhythm: Normal rate and regular rhythm.      " Heart sounds: No murmur heard.     Pulmonary:      Effort: Pulmonary effort is normal. No respiratory distress.      Breath sounds: Normal breath sounds. No stridor. No wheezing.   Abdominal:      General: Bowel sounds are normal.      Palpations: Abdomen is soft.      Tenderness: There is abdominal tenderness.   Genitourinary:     Labia:         Right: No tenderness.         Left: No tenderness.       Vagina: Normal. No vaginal discharge.      Comments: Soft nontender abdomen with no organomegaly, rigidity, guarding or tenderness.  Normal vaginal orifice.  She has  Minimal  leakage with Valsalva.  No significant perineal body abnormalities and a normal external anus.     Musculoskeletal:         General: Tenderness present. No deformity. Normal range of motion.      Cervical back: Normal range of motion.   Skin:     General: Skin is warm and dry.      Capillary Refill: Capillary refill takes less than 2 seconds.      Coloration: Skin is not pale.      Findings: No erythema or rash.   Neurological:      Mental Status: She is alert and oriented to person, place, and time.      Cranial Nerves: No cranial nerve deficit.      Sensory: No sensory deficit.      Coordination: Coordination normal.   Psychiatric:         Behavior: Behavior normal.         Thought Content: Thought content normal.         Judgment: Judgment normal.        Result Review :     Common labs    Common Labsle 5/17/20 5/18/20 5/18/20 5/19/20 5/19/20     0103 1356 0413 0413   Glucose 106 (A) 140 (A)   125 (A)   BUN 6 (A) 9   9   Creatinine 0.71 0.96   0.87   eGFR Non  Am 81 57 (A)   64   Sodium 140 140   140   Potassium 3.2 (A) 4.2   3.9   Chloride 106 106   105   Calcium 8.2 (A) 9.2   9.2   Albumin 3.12 (A) 3.15 (A)      Total Bilirubin 0.3 0.3      Alkaline Phosphatase 79 83      AST (SGOT) 13 14      ALT (SGPT) 13 12      WBC   7.30 7.72    Hemoglobin   10.2 (A) 10.4 (A)    Hematocrit   33.2 (A) 34.1    Platelets   213 185    (A) Abnormal  value            CMP    CMP 5/17/20 5/18/20 5/19/20   Glucose 106 (A) 140 (A) 125 (A)   BUN 6 (A) 9 9   Creatinine 0.71 0.96 0.87   eGFR Non  Am 81 57 (A) 64   Sodium 140 140 140   Potassium 3.2 (A) 4.2 3.9   Chloride 106 106 105   Calcium 8.2 (A) 9.2 9.2   Albumin 3.12 (A) 3.15 (A)    Total Bilirubin 0.3 0.3    Alkaline Phosphatase 79 83    AST (SGOT) 13 14    ALT (SGPT) 13 12    (A) Abnormal value            CBC w/diff    CBC w/Diff 5/16/20 5/18/20 5/19/20   WBC 8.34 7.30 7.72   RBC 3.88 3.76 (A) 3.82   Hemoglobin 10.6 (A) 10.2 (A) 10.4 (A)   Hematocrit 35.0 33.2 (A) 34.1   MCV 90.2 88.3 89.3   MCH 27.3 27.1 27.2   MCHC 30.3 (A) 30.7 (A) 30.5 (A)   RDW 13.6 13.6 13.7   Platelets 175 213 185   Neutrophil Rel % 67.0 63.9 60.4   Immature Granulocyte Rel % 0.8 (A) 1.4 (A) 1.9 (A)   Lymphocyte Rel % 17.7 (A) 15.5 (A) 18.0 (A)   Monocyte Rel % 9.7 12.3 (A) 12.0   Eosinophil Rel % 4.3 6.4 (A) 7.1 (A)   Basophil Rel % 0.5 0.5 0.6   (A) Abnormal value                Most Recent A1C    HGBA1C Most Recent 5/12/20   Hemoglobin A1C 6.60 (A)   (A) Abnormal value            UA    Urinalysis 5/12/20 5/12/20 3/23/21    2044 2044    Squamous Epithelial Cells, UA  13-20 (A)    Specific Gravity, UA 1.028     Ketones, UA Trace (A)  Negative   Blood, UA Negative     Leukocytes, UA Trace (A)  Negative   Nitrite, UA Negative     RBC, UA  0-2    WBC, UA  13-20 (A)    Bacteria, UA  1+ (A)    (A) Abnormal value            Urine Culture    Urine Culture 5/12/20   Urine Culture 25,000 CFU/mL Mixed Yareli Isolated           Covid Tests    Common Labsle 5/12/20   COVID19 Not Detected           Data reviewed: Radiologic studies CT ABDOMEN/PELVIS 5/12/20          Assessment and Plan    Diagnoses and all orders for this visit:    1. Frequency of micturition (Primary)  -     POC Urinalysis Dipstick, Automated  -     Bladder Scan  -     nitrofurantoin, macrocrystal-monohydrate, (Macrobid) 100 MG capsule; Take 1 capsule TWICE DAILY BY MOUTH X 7  DAYS. AFTER, TAKE 1 CAPSULE BY MOUTH DAILY FOR SUPPRESSIVE THERAPY.  Dispense: 56 capsule; Refill: 3  -     Mirabegron ER (Myrbetriq) 25 MG tablet sustained-release 24 hour 24 hr tablet; Take 1 tablet by mouth Daily.  Dispense: 30 tablet; Refill: 5    2. Urinary tract infection without hematuria, site unspecified  -     Urine Culture - Urine, Urine, Random Void  -     Bladder Scan  -     nitrofurantoin, macrocrystal-monohydrate, (Macrobid) 100 MG capsule; Take 1 capsule TWICE DAILY BY MOUTH X 7 DAYS. AFTER, TAKE 1 CAPSULE BY MOUTH DAILY FOR SUPPRESSIVE THERAPY.  Dispense: 56 capsule; Refill: 3  -     Mirabegron ER (Myrbetriq) 25 MG tablet sustained-release 24 hour 24 hr tablet; Take 1 tablet by mouth Daily.  Dispense: 30 tablet; Refill: 5    3. Left lower quadrant abdominal pain  -     XR abdomen kub; Future    4. Acute left flank pain  -     XR abdomen kub; Future    5. Detrusor instability of bladder  -     Bladder Scan  -     nitrofurantoin, macrocrystal-monohydrate, (Macrobid) 100 MG capsule; Take 1 capsule TWICE DAILY BY MOUTH X 7 DAYS. AFTER, TAKE 1 CAPSULE BY MOUTH DAILY FOR SUPPRESSIVE THERAPY.  Dispense: 56 capsule; Refill: 3  -     Mirabegron ER (Myrbetriq) 25 MG tablet sustained-release 24 hour 24 hr tablet; Take 1 tablet by mouth Daily.  Dispense: 30 tablet; Refill: 5        Follow Up     Return for Next scheduled follow up, With Dr. Turner, Cystoscopy.     Patient was given instructions and counseling regarding her condition or for health maintenance advice. Please see specific information pulled into the AVS if appropriate.

## 2021-03-25 ENCOUNTER — TELEPHONE (OUTPATIENT)
Dept: UROLOGY | Facility: CLINIC | Age: 76
End: 2021-03-25

## 2021-03-25 LAB — BACTERIA SPEC AEROBE CULT: ABNORMAL

## 2021-03-25 NOTE — TELEPHONE ENCOUNTER
CALLED PATIENT TO DISCUSS URINE CULTURE POSITIVE. SPOKE WITH HER DTR. CONTINUE ABX AND FOLLOW UP  AS DISCUSSED.          Urine Culture >100,000 CFU/mL Escherichia coliAbnormal           Resulting Agency: [facility]   Susceptibility     Escherichia coli     DAVID     Ampicillin Resistant     Ampicillin + Sulbactam Resistant     Cefazolin Susceptible     Cefepime Susceptible     Ceftazidime Susceptible     Ceftriaxone Susceptible     Gentamicin Susceptible     Levofloxacin Susceptible     Nitrofurantoin Susceptible     Piperacillin + Tazobactam Susceptible     Tetracycline Susceptible     Trimethoprim + Sulfamethoxazole Susceptible

## 2021-04-21 ENCOUNTER — PRIOR AUTHORIZATION (OUTPATIENT)
Dept: UROLOGY | Facility: CLINIC | Age: 76
End: 2021-04-21

## 2021-04-27 ENCOUNTER — PROCEDURE VISIT (OUTPATIENT)
Dept: UROLOGY | Facility: CLINIC | Age: 76
End: 2021-04-27

## 2021-04-27 VITALS — HEIGHT: 69 IN | TEMPERATURE: 98 F | BODY MASS INDEX: 35.4 KG/M2 | WEIGHT: 239 LBS

## 2021-04-27 DIAGNOSIS — R35.0 FREQUENCY OF MICTURITION: ICD-10-CM

## 2021-04-27 DIAGNOSIS — N39.0 URINARY TRACT INFECTION WITHOUT HEMATURIA, SITE UNSPECIFIED: Primary | ICD-10-CM

## 2021-04-27 LAB
BILIRUB BLD-MCNC: NEGATIVE MG/DL
CLARITY, POC: CLEAR
COLOR UR: YELLOW
GLUCOSE UR STRIP-MCNC: NEGATIVE MG/DL
KETONES UR QL: NEGATIVE
LEUKOCYTE EST, POC: NEGATIVE
NITRITE UR-MCNC: NEGATIVE MG/ML
PH UR: 6.5 [PH] (ref 5–8)
PROT UR STRIP-MCNC: NEGATIVE MG/DL
RBC # UR STRIP: NEGATIVE /UL
SP GR UR: 1.01 (ref 1–1.03)
UROBILINOGEN UR QL: NORMAL

## 2021-04-27 PROCEDURE — 87086 URINE CULTURE/COLONY COUNT: CPT | Performed by: UROLOGY

## 2021-04-27 PROCEDURE — 99213 OFFICE O/P EST LOW 20 MIN: CPT | Performed by: UROLOGY

## 2021-04-27 PROCEDURE — 81003 URINALYSIS AUTO W/O SCOPE: CPT | Performed by: UROLOGY

## 2021-04-28 LAB — BACTERIA SPEC AEROBE CULT: NO GROWTH

## 2021-05-05 PROBLEM — N39.0 URINARY TRACT INFECTION WITHOUT HEMATURIA: Status: ACTIVE | Noted: 2021-05-05

## 2021-05-07 ENCOUNTER — APPOINTMENT (OUTPATIENT)
Dept: CARDIOLOGY | Facility: HOSPITAL | Age: 76
End: 2021-05-07

## 2021-05-07 ENCOUNTER — APPOINTMENT (OUTPATIENT)
Dept: NUCLEAR MEDICINE | Facility: HOSPITAL | Age: 76
End: 2021-05-07

## 2021-06-07 ENCOUNTER — HOSPITAL ENCOUNTER (OUTPATIENT)
Dept: NUCLEAR MEDICINE | Facility: HOSPITAL | Age: 76
Discharge: HOME | End: 2021-06-07

## 2021-06-07 ENCOUNTER — HOSPITAL ENCOUNTER (OUTPATIENT)
Dept: CARDIOLOGY | Facility: HOSPITAL | Age: 76
Discharge: HOME | End: 2021-06-07

## 2021-06-07 DIAGNOSIS — R07.2 PRECORDIAL PAIN: ICD-10-CM

## 2021-06-07 PROCEDURE — A9500 TC99M SESTAMIBI: HCPCS | Performed by: PHYSICIAN ASSISTANT

## 2021-06-07 PROCEDURE — 0 TECHNETIUM SESTAMIBI: Performed by: PHYSICIAN ASSISTANT

## 2021-06-07 PROCEDURE — 78452 HT MUSCLE IMAGE SPECT MULT: CPT | Performed by: INTERNAL MEDICINE

## 2021-06-07 PROCEDURE — 78452 HT MUSCLE IMAGE SPECT MULT: CPT

## 2021-06-07 PROCEDURE — 25010000002 REGADENOSON 0.4 MG/5ML SOLUTION: Performed by: INTERNAL MEDICINE

## 2021-06-07 PROCEDURE — 93306 TTE W/DOPPLER COMPLETE: CPT

## 2021-06-07 PROCEDURE — 93018 CV STRESS TEST I&R ONLY: CPT | Performed by: INTERNAL MEDICINE

## 2021-06-07 PROCEDURE — 93306 TTE W/DOPPLER COMPLETE: CPT | Performed by: INTERNAL MEDICINE

## 2021-06-07 PROCEDURE — 93017 CV STRESS TEST TRACING ONLY: CPT

## 2021-06-07 RX ADMIN — TECHNETIUM TC 99M SESTAMIBI 1 DOSE: 1 INJECTION INTRAVENOUS at 12:44

## 2021-06-07 RX ADMIN — TECHNETIUM TC 99M SESTAMIBI 1 DOSE: 1 INJECTION INTRAVENOUS at 10:45

## 2021-06-07 RX ADMIN — REGADENOSON 0.4 MG: 0.08 INJECTION, SOLUTION INTRAVENOUS at 12:44

## 2021-06-08 LAB
BH CV NUCLEAR PRIOR STUDY: 3
BH CV REST NUCLEAR ISOTOPE DOSE: 9.5 MCI
BH CV STRESS BP STAGE 1: NORMAL
BH CV STRESS BP STAGE 2: NORMAL
BH CV STRESS COMMENTS STAGE 1: NORMAL
BH CV STRESS COMMENTS STAGE 2: NORMAL
BH CV STRESS DOSE REGADENOSON STAGE 1: 0.4
BH CV STRESS DURATION MIN STAGE 1: 0
BH CV STRESS DURATION MIN STAGE 2: 4
BH CV STRESS DURATION SEC STAGE 1: 10
BH CV STRESS DURATION SEC STAGE 2: 0
BH CV STRESS HR STAGE 1: 90
BH CV STRESS HR STAGE 2: 81
BH CV STRESS NUCLEAR ISOTOPE DOSE: 28.1 MCI
BH CV STRESS PROTOCOL 1: NORMAL
BH CV STRESS RECOVERY BP: NORMAL MMHG
BH CV STRESS RECOVERY HR: 83 BPM
BH CV STRESS STAGE 1: 1
BH CV STRESS STAGE 2: 2
LV EF NUC BP: 64 %
MAXIMAL PREDICTED HEART RATE: 146 BPM
PERCENT MAX PREDICTED HR: 55.48 %
STRESS BASELINE BP: NORMAL MMHG
STRESS BASELINE HR: 55 BPM
STRESS PERCENT HR: 65 %
STRESS POST PEAK BP: NORMAL MMHG
STRESS POST PEAK HR: 81 BPM
STRESS TARGET HR: 124 BPM

## 2021-06-09 LAB
BH CV ECHO MEAS - % IVS THICK: 25.3 %
BH CV ECHO MEAS - % LVPW THICK: 63 %
BH CV ECHO MEAS - ACS: 2.8 CM
BH CV ECHO MEAS - AO ROOT AREA (BSA CORRECTED): 1.6
BH CV ECHO MEAS - AO ROOT AREA: 10.2 CM^2
BH CV ECHO MEAS - AO ROOT DIAM: 3.6 CM
BH CV ECHO MEAS - BSA(HAYCOCK): 2.3 M^2
BH CV ECHO MEAS - BSA: 2.2 M^2
BH CV ECHO MEAS - BZI_BMI: 35.3 KILOGRAMS/M^2
BH CV ECHO MEAS - BZI_METRIC_HEIGHT: 175.3 CM
BH CV ECHO MEAS - BZI_METRIC_WEIGHT: 108.4 KG
BH CV ECHO MEAS - EDV(CUBED): 161 ML
BH CV ECHO MEAS - EDV(MOD-SP4): 57.9 ML
BH CV ECHO MEAS - EDV(TEICH): 143.7 ML
BH CV ECHO MEAS - EF(CUBED): 64 %
BH CV ECHO MEAS - EF(MOD-SP4): 48.9 %
BH CV ECHO MEAS - EF(TEICH): 55 %
BH CV ECHO MEAS - ESV(CUBED): 58 ML
BH CV ECHO MEAS - ESV(MOD-SP4): 29.6 ML
BH CV ECHO MEAS - ESV(TEICH): 64.7 ML
BH CV ECHO MEAS - FS: 28.9 %
BH CV ECHO MEAS - IVS/LVPW: 0.96
BH CV ECHO MEAS - IVSD: 1.1 CM
BH CV ECHO MEAS - IVSS: 1.4 CM
BH CV ECHO MEAS - LA DIMENSION: 3.6 CM
BH CV ECHO MEAS - LA/AO: 1
BH CV ECHO MEAS - LV DIASTOLIC VOL/BSA (35-75): 26 ML/M^2
BH CV ECHO MEAS - LV MASS(C)D: 240.6 GRAMS
BH CV ECHO MEAS - LV MASS(C)DI: 108 GRAMS/M^2
BH CV ECHO MEAS - LV MASS(C)S: 247.6 GRAMS
BH CV ECHO MEAS - LV MASS(C)SI: 111.1 GRAMS/M^2
BH CV ECHO MEAS - LV SYSTOLIC VOL/BSA (12-30): 13.3 ML/M^2
BH CV ECHO MEAS - LVIDD: 5.4 CM
BH CV ECHO MEAS - LVIDS: 3.9 CM
BH CV ECHO MEAS - LVLD AP4: 6.1 CM
BH CV ECHO MEAS - LVLS AP4: 5.8 CM
BH CV ECHO MEAS - LVOT AREA (M): 4.9 CM^2
BH CV ECHO MEAS - LVOT AREA: 4.9 CM^2
BH CV ECHO MEAS - LVOT DIAM: 2.5 CM
BH CV ECHO MEAS - LVPWD: 1.1 CM
BH CV ECHO MEAS - LVPWS: 1.9 CM
BH CV ECHO MEAS - MV A MAX VEL: 81.8 CM/SEC
BH CV ECHO MEAS - MV E MAX VEL: 69.8 CM/SEC
BH CV ECHO MEAS - MV E/A: 0.85
BH CV ECHO MEAS - PA ACC TIME: 0.1 SEC
BH CV ECHO MEAS - PA PR(ACCEL): 34.5 MMHG
BH CV ECHO MEAS - RVDD: 2.7 CM
BH CV ECHO MEAS - SI(CUBED): 46.2 ML/M^2
BH CV ECHO MEAS - SI(MOD-SP4): 12.7 ML/M^2
BH CV ECHO MEAS - SI(TEICH): 35.5 ML/M^2
BH CV ECHO MEAS - SV(CUBED): 103 ML
BH CV ECHO MEAS - SV(MOD-SP4): 28.3 ML
BH CV ECHO MEAS - SV(TEICH): 79 ML
MAXIMAL PREDICTED HEART RATE: 146 BPM
STRESS TARGET HR: 124 BPM

## 2021-06-10 ENCOUNTER — OFFICE VISIT (OUTPATIENT)
Dept: CARDIOLOGY | Facility: CLINIC | Age: 76
End: 2021-06-10

## 2021-06-10 VITALS
RESPIRATION RATE: 16 BRPM | HEIGHT: 67 IN | BODY MASS INDEX: 36.57 KG/M2 | TEMPERATURE: 97.3 F | DIASTOLIC BLOOD PRESSURE: 84 MMHG | WEIGHT: 233 LBS | HEART RATE: 56 BPM | SYSTOLIC BLOOD PRESSURE: 150 MMHG

## 2021-06-10 DIAGNOSIS — R07.2 PRECORDIAL PAIN: Primary | ICD-10-CM

## 2021-06-10 DIAGNOSIS — I10 ESSENTIAL HYPERTENSION: ICD-10-CM

## 2021-06-10 DIAGNOSIS — I50.32 CHRONIC DIASTOLIC HEART FAILURE (HCC): ICD-10-CM

## 2021-06-10 DIAGNOSIS — I48.0 PAROXYSMAL ATRIAL FIBRILLATION (HCC): ICD-10-CM

## 2021-06-10 PROCEDURE — 99214 OFFICE O/P EST MOD 30 MIN: CPT | Performed by: PHYSICIAN ASSISTANT

## 2021-06-10 RX ORDER — LISINOPRIL 5 MG/1
5 TABLET ORAL DAILY
Qty: 90 TABLET | Refills: 3 | Status: SHIPPED | OUTPATIENT
Start: 2021-06-10 | End: 2021-10-22 | Stop reason: SDUPTHER

## 2021-06-10 RX ORDER — ASPIRIN 81 MG/1
81 TABLET ORAL DAILY
Qty: 90 TABLET | Refills: 3 | Status: SHIPPED | OUTPATIENT
Start: 2021-06-10 | End: 2021-10-22 | Stop reason: SDUPTHER

## 2021-06-10 NOTE — PROGRESS NOTES
Niki Antony APRN  Mayra Hays  1945  06/10/2021    Patient Active Problem List   Diagnosis   • Chronic diastolic heart failure (CMS/HCC)   • Elevated troponin   • Chest pain in adult   • Essential hypertension   • Type 2 diabetes mellitus (CMS/HCC)   • Abnormal nuclear stress test with moderate to large size anteroapical and lateral wall myocardial ischemia.   • Acute renal insufficiency   • Paroxysmal atrial fibrillation (CMS/HCC)   • Chest pain   • Ventral hernia without obstruction or gangrene   • Chronic obstructive pulmonary disease (CMS/HCC)   • Urinary tract infection without hematuria       Dear Niki Antony, ERASTO:    Subjective     History of Present Illness:    Chief Complaint   Patient presents with   • Results     stress and echo findings   • Shortness of Breath     mild/mod exertion   • Med Management     verbal       Mayra Hays is a pleasant 75 y.o. female with a past medical history significant for paroxysmal atrial fibrillation, diabetes mellitus, essential hypertension, and dyslipidemia.  Patient comes in today for results of stress test and echocardiogram.    Patient stress test did reveal a small sized mild degree of ischemia in the lateral wall consistent with low risk study.  Echocardiogram showed normal LVEF with only grade 1 diastolic dysfunction with no significant valvular abnormalities.  Patient reports she does still have occasional chest pains that she described as tightness in the center of her chest that occur roughly once weekly.  Denies worsening shortness of breath from baseline nor bleeding issues with her Eliquis.    No Known Allergies:      Current Outpatient Medications:   •  albuterol sulfate  (90 Base) MCG/ACT inhaler, Inhale 2 puffs Every 4 (Four) Hours As Needed for Wheezing., Disp: , Rfl:   •  apixaban (ELIQUIS) 5 MG tablet tablet, Take 5 mg by mouth 2 (Two) Times a Day. Pt and daughter report she takes Eliquis but cannot find it filled any more  recently than Oct 2019 at [facility] for 30 days supply., Disp: , Rfl:   •  atorvastatin (LIPITOR) 20 MG tablet, Take 20 mg by mouth Daily., Disp: , Rfl:   •  Breo Ellipta 100-25 MCG/INH inhaler, Inhale 1 puff by mouth once daily., Disp: 60 each, Rfl: 6  •  bumetanide (BUMEX) 1 MG tablet, Take 1 mg by mouth Daily As Needed (swelling)., Disp: , Rfl:   •  esomeprazole (nexIUM) 20 MG capsule, Take 1 capsule by mouth Daily., Disp: , Rfl:   •  ferrous sulfate 325 (65 FE) MG tablet, Take 325 mg by mouth 2 (two) times a day., Disp: , Rfl:   •  GAS RELIEF 125 MG capsule capsule, Take As Directed., Disp: , Rfl:   •  hydrOXYzine pamoate (VISTARIL) 25 MG capsule, Take 25 mg by mouth Every Night., Disp: , Rfl:   •  Incruse Ellipta 62.5 MCG/INH aerosol powder , Take As Directed., Disp: , Rfl:   •  loratadine (CLARITIN) 10 MG tablet, Take 10 mg by mouth Daily As Needed for Allergies., Disp: , Rfl:   •  metFORMIN (GLUCOPHAGE) 500 MG tablet, Take 1 tablet by mouth 2 (two) times a day., Disp: , Rfl:   •  metoprolol tartrate (LOPRESSOR) 25 MG tablet, Take 1 tablet by mouth 2 (two) times a day., Disp: , Rfl:   •  Mirabegron ER (Myrbetriq) 25 MG tablet sustained-release 24 hour 24 hr tablet, Take 1 tablet by mouth Daily., Disp: 30 tablet, Rfl: 5  •  O2 (OXYGEN), Inhale 2 L/min As Needed., Disp: , Rfl:   •  potassium chloride (K-DUR,KLOR-CON) 20 MEQ CR tablet, Take 20 mEq by mouth Daily., Disp: , Rfl:   •  sennosides-docusate (PERICOLACE) 8.6-50 MG per tablet, Take 2 tablets by mouth Daily As Needed for Constipation., Disp: 30 tablet, Rfl: 1  •  Stool Softener 100 MG capsule, Take 1 capsule by mouth 2 (two) times a day., Disp: , Rfl:   •  tiotropium bromide monohydrate (SPIRIVA RESPIMAT) 2.5 MCG/ACT aerosol solution inhaler, Inhale 2 puffs Daily., Disp: 4 g, Rfl: 6  •  aspirin (aspirin) 81 MG EC tablet, Take 1 tablet by mouth Daily., Disp: 90 tablet, Rfl: 3  •  lisinopril (PRINIVIL,ZESTRIL) 5 MG tablet, Take 1 tablet by mouth Daily.,  "Disp: 90 tablet, Rfl: 3  •  nitrofurantoin, macrocrystal-monohydrate, (Macrobid) 100 MG capsule, Take 1 capsule TWICE DAILY BY MOUTH X 7 DAYS. AFTER, TAKE 1 CAPSULE BY MOUTH DAILY FOR SUPPRESSIVE THERAPY., Disp: 56 capsule, Rfl: 3  •  pantoprazole (PROTONIX) 40 MG EC tablet, Take 40 mg by mouth Daily As Needed (acid reflux)., Disp: , Rfl:     The following portions of the patient's history were reviewed and updated as appropriate: allergies, current medications, past family history, past medical history, past social history, past surgical history and problem list.    Social History     Tobacco Use   • Smoking status: Former Smoker     Packs/day: 3.00     Types: Cigarettes     Quit date:      Years since quittin.4   • Smokeless tobacco: Never Used   Vaping Use   • Vaping Use: Never used   Substance Use Topics   • Alcohol use: No   • Drug use: No         Objective   Vitals:    06/10/21 1015   BP: 150/84   Pulse: 56   Resp: 16   Temp: 97.3 °F (36.3 °C)   Weight: 106 kg (233 lb)   Height: 170.2 cm (67\")     Body mass index is 36.49 kg/m².    Constitutional:       General: Not in acute distress.     Appearance: Healthy appearance. Well-developed and not in distress. Not diaphoretic.   Eyes:      Conjunctiva/sclera: Conjunctivae normal.      Pupils: Pupils are equal, round, and reactive to light.   HENT:      Head: Normocephalic and atraumatic.   Neck:      Vascular: No carotid bruit or JVD.   Pulmonary:      Effort: Pulmonary effort is normal. No respiratory distress.      Breath sounds: Normal breath sounds.   Cardiovascular:      Normal rate. Regular rhythm.   Skin:     General: Skin is cool.   Neurological:      Mental Status: Alert, oriented to person, place, and time and oriented to person, place and time.         Lab Results   Component Value Date     2020    K 3.9 2020     2020    CO2 22.4 2020    BUN 9 2020    CREATININE 0.87 2020    GLUCOSE 125 (H) " 05/19/2020    CALCIUM 9.2 05/19/2020    AST 14 05/18/2020    ALT 12 05/18/2020    ALKPHOS 83 05/18/2020     Lab Results   Component Value Date    CKTOTAL 14 (L) 06/25/2018     Lab Results   Component Value Date    WBC 7.72 05/19/2020    HGB 10.4 (L) 05/19/2020    HCT 34.1 05/19/2020     05/19/2020     Lab Results   Component Value Date    INR 1.00 05/15/2020    INR 0.99 05/14/2020    INR 1.02 06/01/2017     Lab Results   Component Value Date    MG 2.3 05/18/2020     Lab Results   Component Value Date    TSH 2.670 12/18/2019    TRIG 119 12/13/2018    HDL 33 (L) 12/13/2018    LDL 72 12/13/2018      Lab Results   Component Value Date    BNP 29.0 02/01/2019       During this visit the following were done:  Labs Reviewed [x]    Labs Ordered []    Radiology Reports Reviewed []    Radiology Ordered []    PCP Records Reviewed []    Referring Provider Records Reviewed []    ER Records Reviewed []    Hospital Records Reviewed []    History Obtained From Family []    Radiology Images Reviewed []    Other Reviewed []    Records Requested []       Procedures    Assessment/Plan    Diagnosis Plan   1. Precordial pain  Lipid Panel    Comprehensive Metabolic Panel    CBC (No Diff)   2. Chronic diastolic heart failure (CMS/HCC)  Lipid Panel    Comprehensive Metabolic Panel    CBC (No Diff)   3. Essential hypertension  CBC (No Diff)   4. Paroxysmal atrial fibrillation (CMS/HCC)  CBC (No Diff)            Recommendations:  1. Precordial pain  1. Discussed results of stress test and echocardiogram.  2. We will start patient on 81 mg aspirin I did explain to her the increased risk of bleeding with aspirin and Eliquis she expressed understanding and agreed to proceed.  3. We will also start lisinopril 5 mg daily for optimizing medical therapy.  4. We will check CMP, lipid panel, and CBC in 1 to 2 weeks.      Return in about 3 months (around 9/10/2021).    As always, I appreciate very much the opportunity to participate in the  cardiovascular care of your patients.      With Best Regards,    Darek Nelson PA-C

## 2021-06-11 ENCOUNTER — LAB (OUTPATIENT)
Dept: LAB | Facility: HOSPITAL | Age: 76
End: 2021-06-11

## 2021-06-11 DIAGNOSIS — I10 ESSENTIAL HYPERTENSION: ICD-10-CM

## 2021-06-11 DIAGNOSIS — I48.0 PAROXYSMAL ATRIAL FIBRILLATION (HCC): ICD-10-CM

## 2021-06-11 DIAGNOSIS — I50.32 CHRONIC DIASTOLIC HEART FAILURE (HCC): ICD-10-CM

## 2021-06-11 DIAGNOSIS — R07.2 PRECORDIAL PAIN: ICD-10-CM

## 2021-06-11 LAB
ALBUMIN SERPL-MCNC: 4.4 G/DL (ref 3.5–5.2)
ALBUMIN/GLOB SERPL: 1.3 G/DL
ALP SERPL-CCNC: 140 U/L (ref 39–117)
ALT SERPL W P-5'-P-CCNC: 22 U/L (ref 1–33)
ANION GAP SERPL CALCULATED.3IONS-SCNC: 10.1 MMOL/L (ref 5–15)
AST SERPL-CCNC: 19 U/L (ref 1–32)
BILIRUB SERPL-MCNC: 0.2 MG/DL (ref 0–1.2)
BUN SERPL-MCNC: 15 MG/DL (ref 8–23)
BUN/CREAT SERPL: 15.5 (ref 7–25)
CALCIUM SPEC-SCNC: 9 MG/DL (ref 8.6–10.5)
CHLORIDE SERPL-SCNC: 101 MMOL/L (ref 98–107)
CHOLEST SERPL-MCNC: 157 MG/DL (ref 0–200)
CO2 SERPL-SCNC: 26.9 MMOL/L (ref 22–29)
CREAT SERPL-MCNC: 0.97 MG/DL (ref 0.57–1)
DEPRECATED RDW RBC AUTO: 39.5 FL (ref 37–54)
ERYTHROCYTE [DISTWIDTH] IN BLOOD BY AUTOMATED COUNT: 12.6 % (ref 12.3–15.4)
GFR SERPL CREATININE-BSD FRML MDRD: 56 ML/MIN/1.73
GLOBULIN UR ELPH-MCNC: 3.3 GM/DL
GLUCOSE SERPL-MCNC: 102 MG/DL (ref 65–99)
HCT VFR BLD AUTO: 40.1 % (ref 34–46.6)
HDLC SERPL-MCNC: 53 MG/DL (ref 40–60)
HGB BLD-MCNC: 13 G/DL (ref 12–15.9)
LDLC SERPL CALC-MCNC: 83 MG/DL (ref 0–100)
LDLC/HDLC SERPL: 1.51 {RATIO}
MCH RBC QN AUTO: 27.5 PG (ref 26.6–33)
MCHC RBC AUTO-ENTMCNC: 32.4 G/DL (ref 31.5–35.7)
MCV RBC AUTO: 84.8 FL (ref 79–97)
PLATELET # BLD AUTO: 233 10*3/MM3 (ref 140–450)
PMV BLD AUTO: 11.3 FL (ref 6–12)
POTASSIUM SERPL-SCNC: 4.4 MMOL/L (ref 3.5–5.2)
PROT SERPL-MCNC: 7.7 G/DL (ref 6–8.5)
RBC # BLD AUTO: 4.73 10*6/MM3 (ref 3.77–5.28)
SODIUM SERPL-SCNC: 138 MMOL/L (ref 136–145)
TRIGL SERPL-MCNC: 119 MG/DL (ref 0–150)
VLDLC SERPL-MCNC: 21 MG/DL (ref 5–40)
WBC # BLD AUTO: 6.56 10*3/MM3 (ref 3.4–10.8)

## 2021-06-11 PROCEDURE — 85027 COMPLETE CBC AUTOMATED: CPT

## 2021-06-11 PROCEDURE — 36415 COLL VENOUS BLD VENIPUNCTURE: CPT

## 2021-06-11 PROCEDURE — 80053 COMPREHEN METABOLIC PANEL: CPT

## 2021-06-11 PROCEDURE — 80061 LIPID PANEL: CPT

## 2021-07-13 ENCOUNTER — OFFICE VISIT (OUTPATIENT)
Dept: PULMONOLOGY | Facility: CLINIC | Age: 76
End: 2021-07-13

## 2021-07-13 VITALS
TEMPERATURE: 97.5 F | WEIGHT: 225 LBS | HEART RATE: 60 BPM | SYSTOLIC BLOOD PRESSURE: 138 MMHG | OXYGEN SATURATION: 94 % | BODY MASS INDEX: 35.31 KG/M2 | DIASTOLIC BLOOD PRESSURE: 88 MMHG | HEIGHT: 67 IN

## 2021-07-13 DIAGNOSIS — J44.9 CHRONIC OBSTRUCTIVE PULMONARY DISEASE, UNSPECIFIED COPD TYPE (HCC): Primary | ICD-10-CM

## 2021-07-13 DIAGNOSIS — F17.211 CIGARETTE NICOTINE DEPENDENCE IN REMISSION: ICD-10-CM

## 2021-07-13 DIAGNOSIS — G47.33 OSA (OBSTRUCTIVE SLEEP APNEA): ICD-10-CM

## 2021-07-13 PROCEDURE — 99213 OFFICE O/P EST LOW 20 MIN: CPT | Performed by: INTERNAL MEDICINE

## 2021-07-13 NOTE — PROGRESS NOTES
Chief complaint: COPD.    History of present illness:   Ms. Valadez is a very pleasant 75-year-old female with a past medical history of severe COPD, on Spiriva inhaler, Breo inhaler and albuterol inhaler and OSWALDO noncompliant with the positive airway pressure therapy.  She presents troponin outpatient clinic as a regular follow-up.  She reports that her shortness of breath is stable at rest on exertion.  She denies any wheezing, coughing or hemoptysis.    Review of Systems:   General ROS: negative for chills, fatigue or fever  Psychological ROS: negative for anxiety or depression  ENT ROS: negative for headaches, visual changes or vocal changes  Respiratory ROS: Negative for cough.  Stable for shortness of breath  Cardiovascular ROS: Negative for chest pain.  Stable for dyspnea on exertion  Gastrointestinal ROS: no abdominal pain, change in bowel habits, or black or bloody stools  Musculoskeletal ROS: negative for joint pain, joint stiffness or joint swelling  Neurological ROS: no TIA or stroke symptoms  Heme- no bleeding, no lumps and bumps in armpit  Skin- no bruises, no rash    Past medical history, past surgical history, social history and family history:  •  has a past medical history of CHF (congestive heart failure) (CMS/[facility]), Collapsed lung, COPD (chronic obstructive pulmonary disease) (CMS/HCC), Coronary artery disease, Diabetes mellitus (CMS/HCC), Elevated cholesterol, GERD (gastroesophageal reflux disease), Hyperlipidemia, Hypertension, Myocardial infarction (CMS/HCC), and Stroke (CMS/[facility]).  •  has a past surgical history that includes Cardiac catheterization (N/A, 8/22/2017); Gallbladder surgery; Abdominal surgery; Colonoscopy; Esophagogastroduodenoscopy; and Ventral hernia repair (N/A, 5/14/2020).  •  reports that she quit smoking about 6 years ago. Her smoking use included cigarettes. She smoked 3.00 packs per day. She has never used smokeless tobacco. She reports that she does not drink alcohol and does  not use drugs.  • family history includes Heart disease in her father and mother.     Medication and allergies:  • Active medication:  Current Outpatient Medications on File Prior to Visit   Medication Sig   • albuterol sulfate  (90 Base) MCG/ACT inhaler Inhale 2 puffs Every 4 (Four) Hours As Needed for Wheezing.   • apixaban (ELIQUIS) 5 MG tablet tablet Take 5 mg by mouth 2 (Two) Times a Day. Pt and daughter report she takes Eliquis but cannot find it filled any more recently than Oct 2019 at [facility] for 30 days supply.   • aspirin (aspirin) 81 MG EC tablet Take 1 tablet by mouth Daily.   • atorvastatin (LIPITOR) 20 MG tablet Take 20 mg by mouth Daily.   • bumetanide (BUMEX) 1 MG tablet Take 1 mg by mouth Daily As Needed (swelling).   • esomeprazole (nexIUM) 20 MG capsule Take 1 capsule by mouth Daily.   • ferrous sulfate 325 (65 FE) MG tablet Take 325 mg by mouth 2 (two) times a day.   • GAS RELIEF 125 MG capsule capsule Take As Directed.   • hydrOXYzine pamoate (VISTARIL) 25 MG capsule Take 25 mg by mouth Every Night.   • Incruse Ellipta 62.5 MCG/INH aerosol powder  Take As Directed.   • lisinopril (PRINIVIL,ZESTRIL) 5 MG tablet Take 1 tablet by mouth Daily.   • loratadine (CLARITIN) 10 MG tablet Take 10 mg by mouth Daily As Needed for Allergies.   • metFORMIN (GLUCOPHAGE) 500 MG tablet Take 1 tablet by mouth 2 (two) times a day.   • metoprolol tartrate (LOPRESSOR) 25 MG tablet Take 1 tablet by mouth 2 (two) times a day.   • Mirabegron ER (Myrbetriq) 25 MG tablet sustained-release 24 hour 24 hr tablet Take 1 tablet by mouth Daily.   • nitrofurantoin, macrocrystal-monohydrate, (Macrobid) 100 MG capsule Take 1 capsule TWICE DAILY BY MOUTH X 7 DAYS. AFTER, TAKE 1 CAPSULE BY MOUTH DAILY FOR SUPPRESSIVE THERAPY.   • O2 (OXYGEN) Inhale 2 L/min As Needed.   • pantoprazole (PROTONIX) 40 MG EC tablet Take 40 mg by mouth Daily As Needed (acid reflux).   • potassium chloride (K-DUR,KLOR-CON) 20 MEQ CR tablet  "Take 20 mEq by mouth Daily.   • sennosides-docusate (PERICOLACE) 8.6-50 MG per tablet Take 2 tablets by mouth Daily As Needed for Constipation.   • Stool Softener 100 MG capsule Take 1 capsule by mouth 2 (two) times a day.   • [DISCONTINUED] Breo Ellipta 100-25 MCG/INH inhaler Inhale 1 puff by mouth once daily.   • [DISCONTINUED] tiotropium bromide monohydrate (SPIRIVA RESPIMAT) 2.5 MCG/ACT aerosol solution inhaler Inhale 2 puffs Daily.     No current facility-administered medications on file prior to visit.        Allergies:    Patient has no known allergies.      Vital Signs    height is 170.2 cm (67\") and weight is 102 kg (225 lb). Her temperature is 97.5 °F (36.4 °C). Her blood pressure is 138/88 and her pulse is 60. Her oxygen saturation is 94%.      Physical Exam:  Constitutional:  oriented to person, place, and time. No distress.   Head: Normocephalic and atraumatic.   Right Ear and left Ear : External ear normal.   Nose: Nose normal.   Eyes: Pupils are equal, round, and reactive to light.  No scleral icterus.   Neck: Normal range of motion. Neck supple.    Cardiovascular: Normal rate, regular rhythm, normal heart sounds. No murmur heard.  Pulmonary: Bilateral air entry equal.  No wheezing.  No crackles.  Abdominal: Soft. Bowel sounds are normal. There is no tenderness.   Musculoskeletal: Normal range of motion. Exhibits no deformity.   Neurological: alert and oriented to person, place, and time. No cranial nerve deficit. Coordination normal.   Skin: Skin is warm and dry. No erythema.   Psychiatric:Normal mood and affect.   behavior is normal.     Result review:        CT CHEST W CONTRAST-        TECHNIQUE: Multiple axial CT images were obtained from lung apex through  upper abdomen with administration of IV contrast. Reformatted images in  the coronal and/or sagittal plane(s) were generated from the axial data  set to facilitate diagnostic accuracy and/or surgical planning.  Oral Contrast:NONE.     Radiation " dose reduction techniques were utilized per ALARA protocol.  Automated exposure control was initiated through either or [employer] or  [employer] software packages by  protocol.          870.66 mGy.cm  Clinical information  dysnea; J18.1-Lobar pneumonia, unspecified organism; J44.1-Chronic  obstructive pulmonary disease with (acute) exacerbation      Comparison  NONE.     Findings  LUNGS: Unremarkable. No parenchymal soft tissue nodules.  No focal air  space disease.     HEART: Unremarkable.     PERICARDIUM: No effusion.     MEDIASTINUM: No masses. No enlarged lymph nodes.  No fluid collections.     PLEURA: No pleural effusion. No pleural mass or abnormal calcification.     MAJOR AIRWAYS: Clear. No intrinsic mass.     VASCULATURE: No evidence of aneurysm.     VISUALIZED UPPER ABDOMEN:        LIVER: Homogeneous. No focal hepatic mass or ductal dilatation.        SPLEEN: Homogeneous. No splenomegaly.        ADRENALS: No mass.        KIDNEYS: No mass. No obstructive uropathy.  No evidence of  urolithiasis.        GI TRACT: Non-dilated. No definite wall thickening.        PERITONEUM: No free air. No free fluid or loculated fluid  collections.           ABDOMINAL WALL: No focal hernia or mass.           OTHER: None.     BONES: No acute bony abnormality.     IMPRESSION:  Impression:  1. Unremarkable exam.  No source for the patient symptoms.  2. Other incidental/non-acute findings as above.    Assessment and plan:   Diagnosis Plan   1. Chronic obstructive pulmonary disease, unspecified COPD type (CMS/HCC)  Breo Ellipta 100-25 MCG/INH inhaler    tiotropium bromide monohydrate (SPIRIVA RESPIMAT) 2.5 MCG/ACT aerosol solution inhaler  On albuterol inhaler   2. OSWALDO (obstructive sleep apnea)   patient was counseled about positive airway pressure therapy and its benefit and the risk of untreated obstructive sleep apnea.   3. Cigarette nicotine dependence in remission   CT Chest Low Dose Cancer Screening WO                Return in about 3 months (around 10/13/2021) for Next scheduled follow up.      Thank you for involving us in the care of the patient.  Curtis Ruiz MD  Pulmonary and Critical Care Medicine

## 2021-07-22 ENCOUNTER — OFFICE VISIT (OUTPATIENT)
Dept: CARDIOLOGY | Facility: CLINIC | Age: 76
End: 2021-07-22

## 2021-07-22 VITALS
DIASTOLIC BLOOD PRESSURE: 71 MMHG | WEIGHT: 232.4 LBS | HEART RATE: 51 BPM | HEIGHT: 67 IN | SYSTOLIC BLOOD PRESSURE: 167 MMHG | TEMPERATURE: 96.8 F | BODY MASS INDEX: 36.47 KG/M2

## 2021-07-22 DIAGNOSIS — R94.39 ABNORMAL NUCLEAR STRESS TEST: Primary | ICD-10-CM

## 2021-07-22 DIAGNOSIS — I50.32 CHRONIC HEART FAILURE WITH PRESERVED EJECTION FRACTION (HCC): ICD-10-CM

## 2021-07-22 DIAGNOSIS — I10 ESSENTIAL HYPERTENSION: ICD-10-CM

## 2021-07-22 DIAGNOSIS — I48.0 PAROXYSMAL ATRIAL FIBRILLATION (HCC): ICD-10-CM

## 2021-07-22 PROBLEM — R77.8 ELEVATED TROPONIN: Status: RESOLVED | Noted: 2017-07-20 | Resolved: 2021-07-22

## 2021-07-22 PROBLEM — R79.89 ELEVATED TROPONIN: Status: RESOLVED | Noted: 2017-07-20 | Resolved: 2021-07-22

## 2021-07-22 PROBLEM — R07.9 CHEST PAIN: Status: RESOLVED | Noted: 2018-12-12 | Resolved: 2021-07-22

## 2021-07-22 PROBLEM — R07.9 CHEST PAIN IN ADULT: Status: RESOLVED | Noted: 2017-07-20 | Resolved: 2021-07-22

## 2021-07-22 PROCEDURE — 99214 OFFICE O/P EST MOD 30 MIN: CPT | Performed by: PHYSICIAN ASSISTANT

## 2021-07-22 PROCEDURE — 93000 ELECTROCARDIOGRAM COMPLETE: CPT | Performed by: PHYSICIAN ASSISTANT

## 2021-07-22 RX ORDER — METOPROLOL SUCCINATE 25 MG/1
25 TABLET, EXTENDED RELEASE ORAL DAILY
Qty: 90 TABLET | Refills: 3 | Status: SHIPPED | OUTPATIENT
Start: 2021-07-22 | End: 2021-10-22 | Stop reason: SDUPTHER

## 2021-07-22 RX ORDER — ISOSORBIDE MONONITRATE 60 MG/1
60 TABLET, EXTENDED RELEASE ORAL EVERY MORNING
Qty: 90 TABLET | Refills: 2 | Status: SHIPPED | OUTPATIENT
Start: 2021-07-22 | End: 2021-10-22 | Stop reason: SDUPTHER

## 2021-07-22 NOTE — PROGRESS NOTES
Niki Antony APRN  Mayra Hays  1945 07/22/2021    Patient Active Problem List   Diagnosis   • Chronic heart failure with preserved ejection fraction (CMS/HCC)   • Essential hypertension   • Type 2 diabetes mellitus (CMS/HCC)   • Abnormal nuclear stress test with moderate to large size anteroapical and lateral wall myocardial ischemia.   • Acute renal insufficiency   • Paroxysmal atrial fibrillation (CMS/HCC)   • Ventral hernia without obstruction or gangrene   • Chronic obstructive pulmonary disease (CMS/HCC)   • Urinary tract infection without hematuria       Dear Niki Antony APRN:    Subjective     History of Present Illness:    Chief Complaint   Patient presents with   • Follow-up     LAB RESULTS       Mayra Hays is a pleasant 75 y.o. female with a past medical history significant for no known CAD but did have abnormal nuclear stress test.  She does have diabetes mellitus, she does not smoke tobacco anymore but does have 112-year pack year history where she averaged 2 to 3 packs a day for 56 years.  She also has paroxysmal atrial fibrillation anticoagulated with Eliquis, severe COPD and follows with pulmonology, essential hypertension, and dyslipidemia.  She comes in today for routine cardiology follow-up.    With open questions Ms. Hays did deny any chest pains today however upon further questioning she does report some tightness around her chest like something is wrapped around her and squeezing her.  She reports that this pain is coming on every day and can last upwards of 30 minutes at a time.  She does also report shortness of breath although this is somewhat chronic due to her COPD.      Stress test on 6/7/2021  Interpretation Summary  · A pharmacological stress test was performed using regadenoson without low-level exercise.  · Findings consistent with a normal ECG stress test.  · Myocardial perfusion imaging indicates a small-sized, mild degree of ischemia located in the lateral  wall.  · Normal LV cavity size. Normal LV wall motion noted.  · Left ventricular ejection fraction is normal. (Calculated EF = 64%).  · Impressions are consistent with a low risk study.              No Known Allergies:      Current Outpatient Medications:   •  albuterol sulfate  (90 Base) MCG/ACT inhaler, Inhale 2 puffs Every 4 (Four) Hours As Needed for Wheezing., Disp: , Rfl:   •  apixaban (ELIQUIS) 5 MG tablet tablet, Take 5 mg by mouth 2 (Two) Times a Day. Pt and daughter report she takes Eliquis but cannot find it filled any more recently than Oct 2019 at [facility] for 30 days supply., Disp: , Rfl:   •  aspirin (aspirin) 81 MG EC tablet, Take 1 tablet by mouth Daily., Disp: 90 tablet, Rfl: 3  •  atorvastatin (LIPITOR) 20 MG tablet, Take 20 mg by mouth Daily., Disp: , Rfl:   •  Breo Ellipta 100-25 MCG/INH inhaler, Inhale 1 puff by mouth once daily., Disp: 60 each, Rfl: 6  •  bumetanide (BUMEX) 1 MG tablet, Take 1 mg by mouth Daily As Needed (swelling)., Disp: , Rfl:   •  esomeprazole (nexIUM) 20 MG capsule, Take 1 capsule by mouth Daily., Disp: , Rfl:   •  ferrous sulfate 325 (65 FE) MG tablet, Take 325 mg by mouth 2 (two) times a day., Disp: , Rfl:   •  GAS RELIEF 125 MG capsule capsule, Take As Directed., Disp: , Rfl:   •  hydrOXYzine pamoate (VISTARIL) 25 MG capsule, Take 25 mg by mouth Every Night., Disp: , Rfl:   •  Incruse Ellipta 62.5 MCG/INH aerosol powder , Take As Directed., Disp: , Rfl:   •  lisinopril (PRINIVIL,ZESTRIL) 5 MG tablet, Take 1 tablet by mouth Daily., Disp: 90 tablet, Rfl: 3  •  loratadine (CLARITIN) 10 MG tablet, Take 10 mg by mouth Daily As Needed for Allergies., Disp: , Rfl:   •  metFORMIN (GLUCOPHAGE) 500 MG tablet, Take 1 tablet by mouth 2 (two) times a day., Disp: , Rfl:   •  Mirabegron ER (Myrbetriq) 25 MG tablet sustained-release 24 hour 24 hr tablet, Take 1 tablet by mouth Daily., Disp: 30 tablet, Rfl: 5  •  nitrofurantoin, macrocrystal-monohydrate, (Macrobid) 100 MG  "capsule, Take 1 capsule TWICE DAILY BY MOUTH X 7 DAYS. AFTER, TAKE 1 CAPSULE BY MOUTH DAILY FOR SUPPRESSIVE THERAPY., Disp: 56 capsule, Rfl: 3  •  O2 (OXYGEN), Inhale 2 L/min As Needed., Disp: , Rfl:   •  pantoprazole (PROTONIX) 40 MG EC tablet, Take 40 mg by mouth Daily As Needed (acid reflux)., Disp: , Rfl:   •  potassium chloride (K-DUR,KLOR-CON) 20 MEQ CR tablet, Take 20 mEq by mouth Daily., Disp: , Rfl:   •  sennosides-docusate (PERICOLACE) 8.6-50 MG per tablet, Take 2 tablets by mouth Daily As Needed for Constipation., Disp: 30 tablet, Rfl: 1  •  Stool Softener 100 MG capsule, Take 1 capsule by mouth 2 (two) times a day., Disp: , Rfl:   •  tiotropium bromide monohydrate (SPIRIVA RESPIMAT) 2.5 MCG/ACT aerosol solution inhaler, Inhale 2 puffs Daily., Disp: 4 g, Rfl: 6  •  isosorbide mononitrate (IMDUR) 60 MG 24 hr tablet, Take 1 tablet by mouth Every Morning., Disp: 90 tablet, Rfl: 2  •  metoprolol succinate XL (TOPROL-XL) 25 MG 24 hr tablet, Take 1 tablet by mouth Daily., Disp: 90 tablet, Rfl: 3    The following portions of the patient's history were reviewed and updated as appropriate: allergies, current medications, past family history, past medical history, past social history, past surgical history and problem list.    Social History     Tobacco Use   • Smoking status: Former Smoker     Packs/day: 3.00     Types: Cigarettes     Quit date:      Years since quittin.5   • Smokeless tobacco: Never Used   Vaping Use   • Vaping Use: Never used   Substance Use Topics   • Alcohol use: No   • Drug use: No         Objective   Vitals:    21 0911   BP: 167/71   Pulse: 51   Temp: 96.8 °F (36 °C)   Weight: 105 kg (232 lb 6.4 oz)   Height: 170.2 cm (67\")     Body mass index is 36.4 kg/m².    Constitutional:       General: Not in acute distress.     Appearance: Healthy appearance. Well-developed and not in distress. Not diaphoretic.   Eyes:      Conjunctiva/sclera: Conjunctivae normal.      Pupils: Pupils are " equal, round, and reactive to light.   HENT:      Head: Normocephalic and atraumatic.   Neck:      Vascular: No carotid bruit or JVD.   Pulmonary:      Effort: Pulmonary effort is normal. No respiratory distress.      Breath sounds: Diffuse Wheezing present.   Cardiovascular:      Normal rate. Regular rhythm.   Skin:     General: Skin is cool.   Neurological:      Mental Status: Alert, oriented to person, place, and time and oriented to person, place and time.         Lab Results   Component Value Date     06/11/2021    K 4.4 06/11/2021     06/11/2021    CO2 26.9 06/11/2021    BUN 15 06/11/2021    CREATININE 0.97 06/11/2021    GLUCOSE 102 (H) 06/11/2021    CALCIUM 9.0 06/11/2021    AST 19 06/11/2021    ALT 22 06/11/2021    ALKPHOS 140 (H) 06/11/2021     Lab Results   Component Value Date    CKTOTAL 14 (L) 06/25/2018     Lab Results   Component Value Date    WBC 6.56 06/11/2021    HGB 13.0 06/11/2021    HCT 40.1 06/11/2021     06/11/2021     Lab Results   Component Value Date    INR 1.00 05/15/2020    INR 0.99 05/14/2020    INR 1.02 06/01/2017     Lab Results   Component Value Date    MG 2.3 05/18/2020     Lab Results   Component Value Date    TSH 2.670 12/18/2019    TRIG 119 06/11/2021    HDL 53 06/11/2021    LDL 83 06/11/2021      Lab Results   Component Value Date    BNP 29.0 02/01/2019       During this visit the following were done:  Labs Reviewed []    Labs Ordered []    Radiology Reports Reviewed []    Radiology Ordered []    PCP Records Reviewed []    Referring Provider Records Reviewed []    ER Records Reviewed []    Hospital Records Reviewed []    History Obtained From Family []    Radiology Images Reviewed []    Other Reviewed []    Records Requested []         ECG 12 Lead    Date/Time: 7/22/2021 9:13 AM  Performed by: Darek Nelson PA-C  Authorized by: Darek Nelson PA-C   Comparison: compared with previous ECG   Similar to previous ECG  Rhythm: sinus rhythm  Conduction:  non-specific intraventricular conduction delay  Other findings: non-specific ST-T wave changes    Clinical impression: non-specific ECG            Assessment/Plan    Diagnosis Plan   1. Abnormal nuclear stress test with moderate to large size anteroapical and lateral wall myocardial ischemia.  Ambulatory Referral to Cardiology   2. Paroxysmal atrial fibrillation (CMS/HCC)     3. Chronic heart failure with preserved ejection fraction (CMS/HCC)     4. Essential hypertension              Recommendations:  1. Persistent class II-II angina  1. I will start patient on isosorbide 60 mg daily.  2. Will change Lopressor to metoprolol succinate for better efficacy.  3. Continue aspirin 81 mg, Lipitor 20 mg, lisinopril 5 mg.  4. I will also refer patient to interventional cardiologist, Dr. Steinberg as if her anginal symptoms persist may have to consider left heart catheterization as she is reaching full GDMT.  Patient expressed understanding.       Return in about 3 months (around 10/22/2021).    As always, I appreciate very much the opportunity to participate in the cardiovascular care of your patients.      With Best Regards,    Darek Nelson PA-C

## 2021-07-27 ENCOUNTER — OFFICE VISIT (OUTPATIENT)
Dept: UROLOGY | Facility: CLINIC | Age: 76
End: 2021-07-27

## 2021-07-27 VITALS — WEIGHT: 231.48 LBS | BODY MASS INDEX: 36.33 KG/M2 | HEIGHT: 67 IN

## 2021-07-27 DIAGNOSIS — N32.81 DETRUSOR INSTABILITY OF BLADDER: ICD-10-CM

## 2021-07-27 DIAGNOSIS — N39.0 URINARY TRACT INFECTION WITHOUT HEMATURIA, SITE UNSPECIFIED: Primary | ICD-10-CM

## 2021-07-27 DIAGNOSIS — R35.0 FREQUENCY OF MICTURITION: ICD-10-CM

## 2021-07-27 LAB
BILIRUB BLD-MCNC: NEGATIVE MG/DL
CLARITY, POC: CLEAR
COLOR UR: YELLOW
GLUCOSE UR STRIP-MCNC: NEGATIVE MG/DL
KETONES UR QL: NEGATIVE
LEUKOCYTE EST, POC: NEGATIVE
NITRITE UR-MCNC: NEGATIVE MG/ML
PH UR: 5 [PH] (ref 5–8)
PROT UR STRIP-MCNC: NEGATIVE MG/DL
RBC # UR STRIP: NEGATIVE /UL
SP GR UR: 1 (ref 1–1.03)
UROBILINOGEN UR QL: NORMAL

## 2021-07-27 PROCEDURE — 87186 SC STD MICRODIL/AGAR DIL: CPT | Performed by: NURSE PRACTITIONER

## 2021-07-27 PROCEDURE — 87086 URINE CULTURE/COLONY COUNT: CPT | Performed by: NURSE PRACTITIONER

## 2021-07-27 PROCEDURE — 87088 URINE BACTERIA CULTURE: CPT | Performed by: NURSE PRACTITIONER

## 2021-07-27 PROCEDURE — 81003 URINALYSIS AUTO W/O SCOPE: CPT | Performed by: NURSE PRACTITIONER

## 2021-07-27 PROCEDURE — 99214 OFFICE O/P EST MOD 30 MIN: CPT | Performed by: NURSE PRACTITIONER

## 2021-07-29 ENCOUNTER — TELEPHONE (OUTPATIENT)
Dept: UROLOGY | Facility: CLINIC | Age: 76
End: 2021-07-29

## 2021-07-29 LAB — BACTERIA SPEC AEROBE CULT: ABNORMAL

## 2021-07-29 NOTE — TELEPHONE ENCOUNTER
Called patient to check on her post clinic visit, and to discuss urine culture results positive.  Spoke with her daughter, advised to increase Macrobid to twice daily for 10 days, and then Macrobid nightly for suppressive therapy.  Patient's daughter will drop off urine in 2 weeks for recheck.  Follow-up in clinic as discussed    Urine Culture 25,000 CFU/mL Escherichia coliAbnormal           Resulting Agency: [facility]   Susceptibility     Escherichia coli     DAVID     Ampicillin Intermediate     Ampicillin + Sulbactam Susceptible     Cefazolin Susceptible     Cefepime Susceptible     Ceftazidime Susceptible     Ceftriaxone Susceptible     Gentamicin Susceptible     Levofloxacin Susceptible     Nitrofurantoin Susceptible     Piperacillin + Tazobactam Susceptible     Tetracycline Susceptible     Trimethoprim + Sulfamethoxazole Susceptible                 Specimen Collected: 07/27/21 10:05 Last

## 2021-10-22 ENCOUNTER — OFFICE VISIT (OUTPATIENT)
Dept: CARDIOLOGY | Facility: CLINIC | Age: 76
End: 2021-10-22

## 2021-10-22 VITALS
HEART RATE: 58 BPM | RESPIRATION RATE: 16 BRPM | WEIGHT: 231.2 LBS | BODY MASS INDEX: 35.04 KG/M2 | TEMPERATURE: 97.3 F | DIASTOLIC BLOOD PRESSURE: 66 MMHG | HEIGHT: 68 IN | SYSTOLIC BLOOD PRESSURE: 138 MMHG

## 2021-10-22 DIAGNOSIS — I10 ESSENTIAL HYPERTENSION: ICD-10-CM

## 2021-10-22 DIAGNOSIS — I50.32 CHRONIC HEART FAILURE WITH PRESERVED EJECTION FRACTION (HCC): ICD-10-CM

## 2021-10-22 DIAGNOSIS — I48.0 PAROXYSMAL ATRIAL FIBRILLATION (HCC): Primary | ICD-10-CM

## 2021-10-22 PROCEDURE — 99214 OFFICE O/P EST MOD 30 MIN: CPT | Performed by: PHYSICIAN ASSISTANT

## 2021-10-22 RX ORDER — ASPIRIN 81 MG/1
81 TABLET ORAL DAILY
Qty: 90 TABLET | Refills: 3 | Status: SHIPPED | OUTPATIENT
Start: 2021-10-22 | End: 2022-01-13

## 2021-10-22 RX ORDER — METOPROLOL SUCCINATE 25 MG/1
25 TABLET, EXTENDED RELEASE ORAL DAILY
Qty: 90 TABLET | Refills: 3 | Status: SHIPPED | OUTPATIENT
Start: 2021-10-22 | End: 2022-07-28

## 2021-10-22 RX ORDER — RANOLAZINE 500 MG/1
500 TABLET, EXTENDED RELEASE ORAL 2 TIMES DAILY
Qty: 60 TABLET | Refills: 3 | Status: ON HOLD | OUTPATIENT
Start: 2021-10-22 | End: 2021-11-22

## 2021-10-22 RX ORDER — LISINOPRIL 5 MG/1
5 TABLET ORAL DAILY
Qty: 90 TABLET | Refills: 3 | Status: SHIPPED | OUTPATIENT
Start: 2021-10-22 | End: 2022-01-13 | Stop reason: SDUPTHER

## 2021-10-22 RX ORDER — BUMETANIDE 1 MG/1
1 TABLET ORAL DAILY PRN
Qty: 30 TABLET | Refills: 2 | Status: SHIPPED | OUTPATIENT
Start: 2021-10-22 | End: 2022-07-28

## 2021-10-22 RX ORDER — ISOSORBIDE MONONITRATE 60 MG/1
60 TABLET, EXTENDED RELEASE ORAL EVERY MORNING
Qty: 90 TABLET | Refills: 2 | Status: ON HOLD | OUTPATIENT
Start: 2021-10-22 | End: 2021-11-22

## 2021-10-22 RX ORDER — ATORVASTATIN CALCIUM 20 MG/1
20 TABLET, FILM COATED ORAL DAILY
Qty: 90 TABLET | Refills: 3 | Status: SHIPPED | OUTPATIENT
Start: 2021-10-22 | End: 2022-01-13 | Stop reason: SDUPTHER

## 2021-10-22 NOTE — PROGRESS NOTES
"Niki Antony APRN  Mayra Hays  1945  10/22/2021    Patient Active Problem List   Diagnosis   • Chronic heart failure with preserved ejection fraction (HCC)   • Essential hypertension   • Type 2 diabetes mellitus (HCC)   • Abnormal nuclear stress test with moderate to large size anteroapical and lateral wall myocardial ischemia.   • Acute renal insufficiency   • Paroxysmal atrial fibrillation (HCC)   • Ventral hernia without obstruction or gangrene   • Chronic obstructive pulmonary disease (HCC)   • Urinary tract infection without hematuria       Dear Niki Antony APRN:    Subjective     History of Present Illness:    Chief Complaint   Patient presents with   • Atrial Fibrillation     3 mos follow   • Congestive Heart Failure   • Shortness of Breath     routine activity   • Med Management     verbal       Mayra Hays is a pleasant 76 y.o. female with a past medical history significant for no known CAD but did have abnormal nuclear stress test.  She does have diabetes mellitus, she does not currently smoke tobacco anymore but does have 112-year pack year history where she averaged 2 to 3 packs a day for 56 years.  She also has paroxysmal atrial fibrillation anticoagulated with Eliquis, severe COPD and follows with pulmonology, essential hypertension, and dyslipidemia.  She comes in today for routine cardiology follow-up.    Mayra reports that she does still have chest pains on a somewhat regular basis for which she tells me it appears to be around once weekly.  She reports that when this pain comes on she will sit and rest and drink some water and the pain resolves on its own.  She reports that this pain will last roughly 10 minutes in duration.  She describes this pain as a type of \"like pain\" although she is also reports that as a \"hard pain\" unable to describe further.  She denies any radiation of this pain she does report associated symptoms of shortness of breath as well.    No Known " Allergies:      Current Outpatient Medications:   •  albuterol sulfate  (90 Base) MCG/ACT inhaler, Inhale 2 puffs Every 4 (Four) Hours As Needed for Wheezing., Disp: , Rfl:   •  apixaban (ELIQUIS) 5 MG tablet tablet, Take 1 tablet by mouth Every 12 (Twelve) Hours., Disp: 180 tablet, Rfl: 3  •  aspirin (aspirin) 81 MG EC tablet, Take 1 tablet by mouth Daily., Disp: 90 tablet, Rfl: 3  •  atorvastatin (LIPITOR) 20 MG tablet, Take 1 tablet by mouth Daily., Disp: 90 tablet, Rfl: 3  •  Breo Ellipta 100-25 MCG/INH inhaler, Inhale 1 puff by mouth once daily., Disp: 60 each, Rfl: 6  •  bumetanide (BUMEX) 1 MG tablet, Take 1 tablet by mouth Daily As Needed (swelling)., Disp: 30 tablet, Rfl: 2  •  esomeprazole (nexIUM) 20 MG capsule, Take 1 capsule by mouth Daily., Disp: , Rfl:   •  ferrous sulfate 325 (65 FE) MG tablet, Take 325 mg by mouth 2 (two) times a day., Disp: , Rfl:   •  GAS RELIEF 125 MG capsule capsule, Take As Directed., Disp: , Rfl:   •  hydrOXYzine pamoate (VISTARIL) 25 MG capsule, Take 25 mg by mouth Every Night., Disp: , Rfl:   •  Incruse Ellipta 62.5 MCG/INH aerosol powder , Take As Directed., Disp: , Rfl:   •  isosorbide mononitrate (IMDUR) 60 MG 24 hr tablet, Take 1 tablet by mouth Every Morning., Disp: 90 tablet, Rfl: 2  •  lisinopril (PRINIVIL,ZESTRIL) 5 MG tablet, Take 1 tablet by mouth Daily., Disp: 90 tablet, Rfl: 3  •  loratadine (CLARITIN) 10 MG tablet, Take 10 mg by mouth Daily As Needed for Allergies., Disp: , Rfl:   •  metFORMIN (GLUCOPHAGE) 500 MG tablet, Take 1 tablet by mouth 2 (two) times a day., Disp: , Rfl:   •  metoprolol succinate XL (TOPROL-XL) 25 MG 24 hr tablet, Take 1 tablet by mouth Daily., Disp: 90 tablet, Rfl: 3  •  Mirabegron ER (Myrbetriq) 25 MG tablet sustained-release 24 hour 24 hr tablet, Take 1 tablet by mouth Daily., Disp: 30 tablet, Rfl: 5  •  nitrofurantoin, macrocrystal-monohydrate, (Macrobid) 100 MG capsule, Take 1 capsule TWICE DAILY BY MOUTH X 7 DAYS. AFTER, TAKE  "1 CAPSULE BY MOUTH DAILY FOR SUPPRESSIVE THERAPY., Disp: 56 capsule, Rfl: 3  •  O2 (OXYGEN), Inhale 2 L/min As Needed., Disp: , Rfl:   •  pantoprazole (PROTONIX) 40 MG EC tablet, Take 40 mg by mouth Daily As Needed (acid reflux)., Disp: , Rfl:   •  potassium chloride (K-DUR,KLOR-CON) 20 MEQ CR tablet, Take 20 mEq by mouth Daily., Disp: , Rfl:   •  sennosides-docusate (PERICOLACE) 8.6-50 MG per tablet, Take 2 tablets by mouth Daily As Needed for Constipation., Disp: 30 tablet, Rfl: 1  •  Stool Softener 100 MG capsule, Take 1 capsule by mouth 2 (two) times a day., Disp: , Rfl:   •  tiotropium bromide monohydrate (SPIRIVA RESPIMAT) 2.5 MCG/ACT aerosol solution inhaler, Inhale 2 puffs Daily., Disp: 4 g, Rfl: 6  •  ranolazine (Ranexa) 500 MG 12 hr tablet, Take 1 tablet by mouth 2 (Two) Times a Day., Disp: 60 tablet, Rfl: 3    The following portions of the patient's history were reviewed and updated as appropriate: allergies, current medications, past family history, past medical history, past social history, past surgical history and problem list.    Social History     Tobacco Use   • Smoking status: Former Smoker     Packs/day: 3.00     Types: Cigarettes     Quit date:      Years since quittin.8   • Smokeless tobacco: Never Used   Vaping Use   • Vaping Use: Never used   Substance Use Topics   • Alcohol use: No   • Drug use: No         Objective   Vitals:    10/22/21 0837   BP: 138/66   Pulse: 58   Resp: 16   Temp: 97.3 °F (36.3 °C)   Weight: 105 kg (231 lb 3.2 oz)   Height: 172.7 cm (68\")     Body mass index is 35.15 kg/m².    Constitutional:       General: Not in acute distress.     Appearance: Healthy appearance. Well-developed and not in distress. Not diaphoretic.   Eyes:      Conjunctiva/sclera: Conjunctivae normal.      Pupils: Pupils are equal, round, and reactive to light.   HENT:      Head: Normocephalic and atraumatic.   Neck:      Vascular: No carotid bruit or JVD.   Pulmonary:      Effort: Pulmonary " effort is normal. No respiratory distress.      Breath sounds: Normal breath sounds.   Cardiovascular:      Normal rate. Regular rhythm.   Skin:     General: Skin is cool.   Neurological:      Mental Status: Alert, oriented to person, place, and time and oriented to person, place and time.         Lab Results   Component Value Date     06/11/2021    K 4.4 06/11/2021     06/11/2021    CO2 26.9 06/11/2021    BUN 15 06/11/2021    CREATININE 0.97 06/11/2021    GLUCOSE 102 (H) 06/11/2021    CALCIUM 9.0 06/11/2021    AST 19 06/11/2021    ALT 22 06/11/2021    ALKPHOS 140 (H) 06/11/2021     Lab Results   Component Value Date    CKTOTAL 14 (L) 06/25/2018     Lab Results   Component Value Date    WBC 6.56 06/11/2021    HGB 13.0 06/11/2021    HCT 40.1 06/11/2021     06/11/2021     Lab Results   Component Value Date    INR 1.00 05/15/2020    INR 0.99 05/14/2020    INR 1.02 06/01/2017     Lab Results   Component Value Date    MG 2.3 05/18/2020     Lab Results   Component Value Date    TSH 2.670 12/18/2019    TRIG 119 06/11/2021    HDL 53 06/11/2021    LDL 83 06/11/2021      Lab Results   Component Value Date    BNP 29.0 02/01/2019       During this visit the following were done:  Labs Reviewed []    Labs Ordered []    Radiology Reports Reviewed []    Radiology Ordered []    PCP Records Reviewed []    Referring Provider Records Reviewed []    ER Records Reviewed []    Hospital Records Reviewed []    History Obtained From Family []    Radiology Images Reviewed []    Other Reviewed []    Records Requested []       Procedures    Assessment/Plan    Diagnosis Plan   1. Paroxysmal atrial fibrillation (HCC)     2. Essential hypertension     3. Chronic heart failure with preserved ejection fraction (HCC)              Recommendations:  1. Precordial pain  1. We will add Ranexa 500 mg twice daily for continued chest pains.  2. Continue aspirin, Lipitor, lisinopril, metoprolol, and Imdur.  3. Encouraged her to keep  appointment with Dr. Steinberg to discuss possible left heart catheterization.  2. Paroxysmal atrial fibrillation  1. Maintaining sinus rhythm today we will continue current therapy denies any bleeding issues with Eliquis.      Return in about 3 months (around 1/22/2022).    As always, I appreciate very much the opportunity to participate in the cardiovascular care of your patients.      With Best Regards,    Darek Nelson PA-C

## 2021-10-26 ENCOUNTER — OFFICE VISIT (OUTPATIENT)
Dept: PULMONOLOGY | Facility: CLINIC | Age: 76
End: 2021-10-26

## 2021-10-26 VITALS
BODY MASS INDEX: 34.83 KG/M2 | OXYGEN SATURATION: 93 % | WEIGHT: 229.8 LBS | HEART RATE: 65 BPM | SYSTOLIC BLOOD PRESSURE: 128 MMHG | DIASTOLIC BLOOD PRESSURE: 80 MMHG | HEIGHT: 68 IN | TEMPERATURE: 98 F

## 2021-10-26 DIAGNOSIS — J44.9 CHRONIC OBSTRUCTIVE PULMONARY DISEASE, UNSPECIFIED COPD TYPE (HCC): Primary | ICD-10-CM

## 2021-10-26 DIAGNOSIS — Z87.891 FORMER SMOKER: ICD-10-CM

## 2021-10-26 DIAGNOSIS — G47.34 NOCTURNAL HYPOXIA: ICD-10-CM

## 2021-10-26 PROCEDURE — 99214 OFFICE O/P EST MOD 30 MIN: CPT | Performed by: PHYSICIAN ASSISTANT

## 2021-10-26 RX ORDER — TOPIRAMATE 25 MG/1
25 TABLET ORAL 2 TIMES DAILY
COMMUNITY

## 2021-10-26 RX ORDER — GUAIFENESIN 600 MG/1
600 TABLET, EXTENDED RELEASE ORAL 2 TIMES DAILY PRN
Qty: 40 TABLET | Refills: 3 | Status: SHIPPED | OUTPATIENT
Start: 2021-10-26 | End: 2021-11-15

## 2021-10-26 NOTE — PROGRESS NOTES
"Chief Complaint  COPD    Subjective          Mayra Hays presents to [facility]  History of Present Illness     Patient presents today for follow up of COPD, chronic hypoxic respiratory failure, cigarette dependence.   She reports stability of shortness of breath at this time. She has been noticing intermittent phlegm production, sometimes difficult to produce. Shortness of breath is currently stable. She did receive supplemental oxygen supplies, but has not been able to obtain small portable tanks from her [employer] company. She continues on Breo and Incruse inhalers. Previously unable to tolerate PAP at nighttime, and thus will use O2 via nasal cannula. Remains a former smoker.       Objective   Vital Signs:   /80 (BP Location: Left arm, Patient Position: Sitting)   Pulse 65   Temp 98 °F (36.7 °C)   Ht 172.7 cm (68\")   Wt 104 kg (229 lb 12.8 oz)   SpO2 93%   BMI 34.94 kg/m²         Physical Exam  Vitals reviewed.   Constitutional:       General: She is not in acute distress.     Appearance: She is well-developed. She is not diaphoretic.   HENT:      Head: Normocephalic and atraumatic.   Neck:      Thyroid: No thyromegaly.   Cardiovascular:      Rate and Rhythm: Normal rate and regular rhythm.      Heart sounds: Normal heart sounds, S1 normal and S2 normal.   Pulmonary:      Effort: Pulmonary effort is normal.      Breath sounds: Rhonchi present. No wheezing or rales.   Neurological:      Mental Status: She is alert and oriented to person, place, and time.   Psychiatric:         Behavior: Behavior normal.        Result Review :     The following data was reviewed by: Sabiha Valadez PA-C on 10/26/2021:  Data reviewed: Radiologic studies CT chest 2019 and PFT 2018       CT chest Jan. 2019:         PFT 2018:         Assessment and Plan    Diagnoses and all orders for this visit:    1. Chronic obstructive pulmonary disease, unspecified COPD type (HCC) " (Primary)    2. Nocturnal hypoxia    3. Former smoker    Other orders  -     guaiFENesin (Mucinex) 600 MG 12 hr tablet; Take 1 tablet by mouth 2 (Two) Times a Day As Needed for Cough or Congestion for up to 20 days.  Dispense: 40 tablet; Refill: 3      COPD:   · Continue Albuterol inhaler as needed  · Continue Breo 100 mcg once daily  · Continue Incruse once daily  · Ordered Mucinex tablets for as needed use with congestion      Addendum 12/13/2021:   Patient noticed significant improvement with use of Breztri compared to Breo/Incruse.   Order placed for Breztri.   Will apply for PA if needed.       Chronic hypoxic respiratory failure:   · Compliant with 2 L/m at nighttime.   · Unable to tolerate previous trial of PAP therapy for [place].     Addendum:   · Contacted [facility] to request small portable tanks.   However, patient has only previously qualified for nocturnal supplemental oxygen.  Discussed with the daughter that she will have to come complete walk test to qualify for ambulatory supplies.  She conveyed understanding.        Cigarette dependence:   · Previous CT chest in 2019 was negative for nodules.  · Reordered low-dose CT chest for lung cancer screening.        Follow Up   Return in about 6 months (around 4/26/2022), or as needed, for Next scheduled follow up.  Patient was given instructions and counseling regarding her condition or for health maintenance advice. Please see specific information pulled into the AVS if appropriate.

## 2021-10-29 PROBLEM — Z87.891 FORMER SMOKER: Status: ACTIVE | Noted: 2021-10-29

## 2021-11-09 ENCOUNTER — OFFICE VISIT (OUTPATIENT)
Dept: CARDIOLOGY | Facility: CLINIC | Age: 76
End: 2021-11-09

## 2021-11-09 VITALS
HEART RATE: 58 BPM | DIASTOLIC BLOOD PRESSURE: 93 MMHG | HEIGHT: 68 IN | OXYGEN SATURATION: 98 % | BODY MASS INDEX: 34.34 KG/M2 | SYSTOLIC BLOOD PRESSURE: 163 MMHG | WEIGHT: 226.6 LBS

## 2021-11-09 DIAGNOSIS — I48.0 PAROXYSMAL ATRIAL FIBRILLATION (HCC): ICD-10-CM

## 2021-11-09 DIAGNOSIS — I50.32 CHRONIC HEART FAILURE WITH PRESERVED EJECTION FRACTION (HCC): ICD-10-CM

## 2021-11-09 DIAGNOSIS — R94.39 ABNORMAL NUCLEAR STRESS TEST: ICD-10-CM

## 2021-11-09 DIAGNOSIS — R94.31 ABNORMAL EKG: ICD-10-CM

## 2021-11-09 DIAGNOSIS — I10 ESSENTIAL HYPERTENSION: ICD-10-CM

## 2021-11-09 DIAGNOSIS — I20.9 ANGINA, CLASS II (HCC): Primary | ICD-10-CM

## 2021-11-09 PROCEDURE — 99214 OFFICE O/P EST MOD 30 MIN: CPT | Performed by: INTERNAL MEDICINE

## 2021-11-10 NOTE — H&P (VIEW-ONLY)
"    Chief Complaint  Follow-up (abnormal stress test)    Subjective       Mayra Hays presents to [facility] CARDIOLOG[facility] for consult abnormal stress test. Referred by YANDY Meza  History of Present Illness   She presents today with an abnormal stress test small-sized, mild degree of ischemia located in the lateral wall. She has intermittent chest pain described as a \"hard pain\" substernal.  Happens weekly. Lasting up to 10 minutes. Relieved with rest. No radiation. Associated symptoms is shortness of breath however she is a current everyday smoker and denies to quit.  The chart, PMH, FMH, social history, PSH, and medications were reviewed today. Problems above addressed this visit.    Objective     Vital Signs:   /93 (BP Location: Left arm, Patient Position: Sitting, Cuff Size: Adult)   Pulse 58   Ht 172.7 cm (68\")   Wt 103 kg (226 lb 9.6 oz)   SpO2 98%   BMI 34.45 kg/m²       Physical Exam  Constitutional:       General: She is not in acute distress.     Appearance: Normal appearance.   HENT:      Head: Normocephalic.      Nose: Nose normal.      Mouth/Throat:      Mouth: Mucous membranes are moist.   Eyes:      Pupils: Pupils are equal, round, and reactive to light.   Neck:      Vascular: No carotid bruit.   Cardiovascular:      Rate and Rhythm: Normal rate and regular rhythm.      Pulses: Normal pulses.      Heart sounds: Normal heart sounds.   Pulmonary:      Effort: Pulmonary effort is normal.      Breath sounds: No wheezing.   Abdominal:      General: Bowel sounds are normal.      Palpations: Abdomen is soft.   Musculoskeletal:         General: No swelling or tenderness.      Cervical back: Neck supple.   Skin:     General: Skin is warm and dry.   Neurological:      General: No focal deficit present.      Mental Status: She is alert and oriented to person, place, and time.   Psychiatric:         Mood and Affect: Mood normal.         Behavior: Behavior normal.         " Thought Content: Thought content normal.         Judgment: Judgment normal.          Result Review :Data reviewed: Cardiology studies 11/09/2021         Assessment and Plan    Abnormal nuclear stress test  Abnormal EKG  Angina CCS class II  Despite maximum antianginal treatment she continues to have angina.  I will proceed with a C on   Follow up post procedure    I have explained the risks associated with the procedure to the patient including but not limited to an allergic reaction to the contrast material or medications used during the procedure bleeding, infection, and bruising at the catheter insertion site blood clots, which may trigger heart attack, stroke,   damage to the artery where the catheter was inserted, or damage to the arteries as the catheter travels through your body, irregular heart rhythm arrhythmias, kidney damage caused by the contrast material.           Problem List Items Addressed This Visit     None            Follow Up     No follow-ups on file.  Patient was given instructions and counseling regarding her condition or for health maintenance advice. Please see specific information pulled into the AVS if appropriate.             Director, Cardiac Cath Lab    ROBEL Worthy, acting as scribe for Tolu Steinberg MD, [facility], [facility].   11/10/21  12:20 EST     I have read and agree the documentation that has been completed regarding this visit.  By signing this record, I attest that the documentation was completed by my physical presence and is an accurate record of the encounter.  Voice recognition / transcription technology used for some documentation in this chart in attempt to mitigate substantial inefficiencies created by this electronic health record technology.  As a result, there may be some typos and.or nonsensical language introduced into the chart that either overlooked in editing/review and/or that I am unable to correct as patient care needs require me to prioritize my attention to  bedside patient care rather than electronic documentation. MA

## 2021-11-10 NOTE — PROGRESS NOTES
"    Chief Complaint  Follow-up (abnormal stress test)    Subjective       Mayra Hays presents to [facility] CARDIOLOG[facility] for consult abnormal stress test. Referred by YANDY Meza  History of Present Illness   She presents today with an abnormal stress test small-sized, mild degree of ischemia located in the lateral wall. She has intermittent chest pain described as a \"hard pain\" substernal.  Happens weekly. Lasting up to 10 minutes. Relieved with rest. No radiation. Associated symptoms is shortness of breath however she is a current everyday smoker and denies to quit.  The chart, PMH, FMH, social history, PSH, and medications were reviewed today. Problems above addressed this visit.    Objective     Vital Signs:   /93 (BP Location: Left arm, Patient Position: Sitting, Cuff Size: Adult)   Pulse 58   Ht 172.7 cm (68\")   Wt 103 kg (226 lb 9.6 oz)   SpO2 98%   BMI 34.45 kg/m²       Physical Exam  Constitutional:       General: She is not in acute distress.     Appearance: Normal appearance.   HENT:      Head: Normocephalic.      Nose: Nose normal.      Mouth/Throat:      Mouth: Mucous membranes are moist.   Eyes:      Pupils: Pupils are equal, round, and reactive to light.   Neck:      Vascular: No carotid bruit.   Cardiovascular:      Rate and Rhythm: Normal rate and regular rhythm.      Pulses: Normal pulses.      Heart sounds: Normal heart sounds.   Pulmonary:      Effort: Pulmonary effort is normal.      Breath sounds: No wheezing.   Abdominal:      General: Bowel sounds are normal.      Palpations: Abdomen is soft.   Musculoskeletal:         General: No swelling or tenderness.      Cervical back: Neck supple.   Skin:     General: Skin is warm and dry.   Neurological:      General: No focal deficit present.      Mental Status: She is alert and oriented to person, place, and time.   Psychiatric:         Mood and Affect: Mood normal.         Behavior: Behavior normal.         " Thought Content: Thought content normal.         Judgment: Judgment normal.          Result Review :Data reviewed: Cardiology studies 11/09/2021         Assessment and Plan    Abnormal nuclear stress test  Abnormal EKG  Angina CCS class II  Despite maximum antianginal treatment she continues to have angina.  I will proceed with a C on   Follow up post procedure    I have explained the risks associated with the procedure to the patient including but not limited to an allergic reaction to the contrast material or medications used during the procedure bleeding, infection, and bruising at the catheter insertion site blood clots, which may trigger heart attack, stroke,   damage to the artery where the catheter was inserted, or damage to the arteries as the catheter travels through your body, irregular heart rhythm arrhythmias, kidney damage caused by the contrast material.           Problem List Items Addressed This Visit     None            Follow Up     No follow-ups on file.  Patient was given instructions and counseling regarding her condition or for health maintenance advice. Please see specific information pulled into the AVS if appropriate.             Director, Cardiac Cath Lab    ROBEL Worthy, acting as scribe for Tolu Steinberg MD, [facility], [facility].   11/10/21  12:20 EST     I have read and agree the documentation that has been completed regarding this visit.  By signing this record, I attest that the documentation was completed by my physical presence and is an accurate record of the encounter.  Voice recognition / transcription technology used for some documentation in this chart in attempt to mitigate substantial inefficiencies created by this electronic health record technology.  As a result, there may be some typos and.or nonsensical language introduced into the chart that either overlooked in editing/review and/or that I am unable to correct as patient care needs require me to prioritize my attention to  bedside patient care rather than electronic documentation. MA

## 2021-11-15 DIAGNOSIS — R94.39 ABNORMAL NUCLEAR STRESS TEST: Primary | ICD-10-CM

## 2021-11-19 ENCOUNTER — LAB (OUTPATIENT)
Dept: LAB | Facility: HOSPITAL | Age: 76
End: 2021-11-19

## 2021-11-19 DIAGNOSIS — R94.39 ABNORMAL NUCLEAR STRESS TEST: ICD-10-CM

## 2021-11-19 LAB — SARS-COV-2 RNA RESP QL NAA+PROBE: NOT DETECTED

## 2021-11-19 PROCEDURE — C9803 HOPD COVID-19 SPEC COLLECT: HCPCS

## 2021-11-19 PROCEDURE — U0003 INFECTIOUS AGENT DETECTION BY NUCLEIC ACID (DNA OR RNA); SEVERE ACUTE RESPIRATORY SYNDROME CORONAVIRUS 2 (SARS-COV-2) (CORONAVIRUS DISEASE [COVID-19]), AMPLIFIED PROBE TECHNIQUE, MAKING USE OF HIGH THROUGHPUT TECHNOLOGIES AS DESCRIBED BY CMS-2020-01-R: HCPCS | Performed by: INTERNAL MEDICINE

## 2021-11-19 PROCEDURE — U0005 INFEC AGEN DETEC AMPLI PROBE: HCPCS | Performed by: INTERNAL MEDICINE

## 2021-11-22 ENCOUNTER — HOSPITAL ENCOUNTER (OUTPATIENT)
Facility: HOSPITAL | Age: 76
Discharge: HOME | End: 2021-11-22
Attending: INTERNAL MEDICINE | Admitting: INTERNAL MEDICINE

## 2021-11-22 VITALS
WEIGHT: 225 LBS | DIASTOLIC BLOOD PRESSURE: 55 MMHG | TEMPERATURE: 97.9 F | HEART RATE: 56 BPM | HEIGHT: 67 IN | BODY MASS INDEX: 35.31 KG/M2 | RESPIRATION RATE: 18 BRPM | SYSTOLIC BLOOD PRESSURE: 130 MMHG | OXYGEN SATURATION: 93 %

## 2021-11-22 DIAGNOSIS — R94.39 ABNORMAL NUCLEAR STRESS TEST: ICD-10-CM

## 2021-11-22 LAB
ANION GAP SERPL CALCULATED.3IONS-SCNC: 10.2 MMOL/L (ref 5–15)
BASOPHILS # BLD AUTO: 0.1 10*3/MM3 (ref 0–0.2)
BASOPHILS NFR BLD AUTO: 1.5 % (ref 0–1.5)
BUN SERPL-MCNC: 22 MG/DL (ref 8–23)
BUN/CREAT SERPL: 21 (ref 7–25)
CALCIUM SPEC-SCNC: 9.4 MG/DL (ref 8.6–10.5)
CHLORIDE SERPL-SCNC: 103 MMOL/L (ref 98–107)
CO2 SERPL-SCNC: 24.8 MMOL/L (ref 22–29)
CREAT SERPL-MCNC: 1.05 MG/DL (ref 0.57–1)
DEPRECATED RDW RBC AUTO: 39.9 FL (ref 37–54)
EOSINOPHIL # BLD AUTO: 0.38 10*3/MM3 (ref 0–0.4)
EOSINOPHIL NFR BLD AUTO: 5.7 % (ref 0.3–6.2)
ERYTHROCYTE [DISTWIDTH] IN BLOOD BY AUTOMATED COUNT: 12.6 % (ref 12.3–15.4)
GFR SERPL CREATININE-BSD FRML MDRD: 51 ML/MIN/1.73
GLUCOSE SERPL-MCNC: 105 MG/DL (ref 65–99)
HCT VFR BLD AUTO: 41.1 % (ref 34–46.6)
HGB BLD-MCNC: 12.5 G/DL (ref 12–15.9)
IMM GRANULOCYTES # BLD AUTO: 0.03 10*3/MM3 (ref 0–0.05)
IMM GRANULOCYTES NFR BLD AUTO: 0.4 % (ref 0–0.5)
LYMPHOCYTES # BLD AUTO: 2.03 10*3/MM3 (ref 0.7–3.1)
LYMPHOCYTES NFR BLD AUTO: 30.3 % (ref 19.6–45.3)
MCH RBC QN AUTO: 26.5 PG (ref 26.6–33)
MCHC RBC AUTO-ENTMCNC: 30.4 G/DL (ref 31.5–35.7)
MCV RBC AUTO: 87.1 FL (ref 79–97)
MONOCYTES # BLD AUTO: 0.67 10*3/MM3 (ref 0.1–0.9)
MONOCYTES NFR BLD AUTO: 10 % (ref 5–12)
NEUTROPHILS NFR BLD AUTO: 3.49 10*3/MM3 (ref 1.7–7)
NEUTROPHILS NFR BLD AUTO: 52.1 % (ref 42.7–76)
NRBC BLD AUTO-RTO: 0 /100 WBC (ref 0–0.2)
PLATELET # BLD AUTO: 248 10*3/MM3 (ref 140–450)
PMV BLD AUTO: 10.9 FL (ref 6–12)
POTASSIUM SERPL-SCNC: 4.2 MMOL/L (ref 3.5–5.2)
RBC # BLD AUTO: 4.72 10*6/MM3 (ref 3.77–5.28)
SODIUM SERPL-SCNC: 138 MMOL/L (ref 136–145)
WBC NRBC COR # BLD: 6.7 10*3/MM3 (ref 3.4–10.8)

## 2021-11-22 PROCEDURE — 99152 MOD SED SAME PHYS/QHP 5/>YRS: CPT | Performed by: INTERNAL MEDICINE

## 2021-11-22 PROCEDURE — 93454 CORONARY ARTERY ANGIO S&I: CPT | Performed by: INTERNAL MEDICINE

## 2021-11-22 PROCEDURE — 25010000002 MIDAZOLAM PER 1 MG: Performed by: INTERNAL MEDICINE

## 2021-11-22 PROCEDURE — 0 IOPAMIDOL PER 1 ML: Performed by: INTERNAL MEDICINE

## 2021-11-22 PROCEDURE — C1894 INTRO/SHEATH, NON-LASER: HCPCS | Performed by: INTERNAL MEDICINE

## 2021-11-22 PROCEDURE — 80048 BASIC METABOLIC PNL TOTAL CA: CPT

## 2021-11-22 PROCEDURE — C1769 GUIDE WIRE: HCPCS | Performed by: INTERNAL MEDICINE

## 2021-11-22 PROCEDURE — 85025 COMPLETE CBC W/AUTO DIFF WBC: CPT

## 2021-11-22 PROCEDURE — 36415 COLL VENOUS BLD VENIPUNCTURE: CPT

## 2021-11-22 PROCEDURE — 25010000002 FENTANYL CITRATE (PF) 50 MCG/ML SOLUTION: Performed by: INTERNAL MEDICINE

## 2021-11-22 PROCEDURE — 25010000002 HEPARIN (PORCINE) PER 1000 UNITS: Performed by: INTERNAL MEDICINE

## 2021-11-22 RX ORDER — LISINOPRIL 2.5 MG/1
5 TABLET ORAL DAILY
Status: DISCONTINUED | OUTPATIENT
Start: 2021-11-22 | End: 2021-11-22 | Stop reason: HOSPADM

## 2021-11-22 RX ORDER — MIDAZOLAM HYDROCHLORIDE 1 MG/ML
INJECTION INTRAMUSCULAR; INTRAVENOUS AS NEEDED
Status: DISCONTINUED | OUTPATIENT
Start: 2021-11-22 | End: 2021-11-22 | Stop reason: HOSPADM

## 2021-11-22 RX ORDER — FENTANYL CITRATE 50 UG/ML
INJECTION, SOLUTION INTRAMUSCULAR; INTRAVENOUS AS NEEDED
Status: DISCONTINUED | OUTPATIENT
Start: 2021-11-22 | End: 2021-11-22 | Stop reason: HOSPADM

## 2021-11-22 RX ORDER — LIDOCAINE HYDROCHLORIDE 20 MG/ML
INJECTION, SOLUTION INFILTRATION; PERINEURAL AS NEEDED
Status: DISCONTINUED | OUTPATIENT
Start: 2021-11-22 | End: 2021-11-22 | Stop reason: HOSPADM

## 2021-11-22 RX ORDER — SODIUM CHLORIDE 9 MG/ML
100 INJECTION, SOLUTION INTRAVENOUS ONCE
Status: DISCONTINUED | OUTPATIENT
Start: 2021-11-22 | End: 2021-11-22 | Stop reason: HOSPADM

## 2021-11-22 RX ORDER — SODIUM CHLORIDE 9 MG/ML
INJECTION, SOLUTION INTRAVENOUS CONTINUOUS PRN
Status: DISCONTINUED | OUTPATIENT
Start: 2021-11-22 | End: 2021-11-22 | Stop reason: HOSPADM

## 2021-11-29 ENCOUNTER — APPOINTMENT (OUTPATIENT)
Dept: CT IMAGING | Facility: HOSPITAL | Age: 76
End: 2021-11-29

## 2021-11-30 ENCOUNTER — HOSPITAL ENCOUNTER (OUTPATIENT)
Dept: CT IMAGING | Facility: HOSPITAL | Age: 76
Discharge: HOME | End: 2021-11-30
Admitting: PHYSICIAN ASSISTANT

## 2021-11-30 DIAGNOSIS — Z87.891 FORMER SMOKER: ICD-10-CM

## 2021-11-30 PROCEDURE — 71271 CT THORAX LUNG CANCER SCR C-: CPT

## 2021-11-30 PROCEDURE — 71271 CT THORAX LUNG CANCER SCR C-: CPT | Performed by: RADIOLOGY

## 2021-12-13 ENCOUNTER — TELEPHONE (OUTPATIENT)
Dept: PULMONOLOGY | Facility: CLINIC | Age: 76
End: 2021-12-13

## 2021-12-13 RX ORDER — BUDESONIDE, GLYCOPYRROLATE, AND FORMOTEROL FUMARATE 160; 9; 4.8 UG/1; UG/1; UG/1
2 AEROSOL, METERED RESPIRATORY (INHALATION) 2 TIMES DAILY
Qty: 10.7 G | Refills: 8 | Status: SHIPPED | OUTPATIENT
Start: 2021-12-13 | End: 2022-02-15 | Stop reason: SDUPTHER

## 2021-12-13 NOTE — TELEPHONE ENCOUNTER
Updated the patient's daughter of CT results.   Stable findings since 2019. Will plan for repeat low dose CT chest in 1 year, will order closer to that date.

## 2022-01-13 ENCOUNTER — OFFICE VISIT (OUTPATIENT)
Dept: CARDIOLOGY | Facility: CLINIC | Age: 77
End: 2022-01-13

## 2022-01-13 VITALS
SYSTOLIC BLOOD PRESSURE: 146 MMHG | HEART RATE: 61 BPM | BODY MASS INDEX: 35.94 KG/M2 | HEIGHT: 67 IN | TEMPERATURE: 96.4 F | DIASTOLIC BLOOD PRESSURE: 66 MMHG | WEIGHT: 229 LBS

## 2022-01-13 DIAGNOSIS — I10 ESSENTIAL HYPERTENSION: ICD-10-CM

## 2022-01-13 DIAGNOSIS — I50.32 CHRONIC HEART FAILURE WITH PRESERVED EJECTION FRACTION: Primary | ICD-10-CM

## 2022-01-13 DIAGNOSIS — I48.0 PAROXYSMAL ATRIAL FIBRILLATION: ICD-10-CM

## 2022-01-13 PROCEDURE — 93000 ELECTROCARDIOGRAM COMPLETE: CPT | Performed by: PHYSICIAN ASSISTANT

## 2022-01-13 PROCEDURE — 99213 OFFICE O/P EST LOW 20 MIN: CPT | Performed by: PHYSICIAN ASSISTANT

## 2022-01-13 RX ORDER — ATORVASTATIN CALCIUM 20 MG/1
20 TABLET, FILM COATED ORAL DAILY
Qty: 90 TABLET | Refills: 3 | Status: SHIPPED | OUTPATIENT
Start: 2022-01-13

## 2022-01-13 RX ORDER — LISINOPRIL 5 MG/1
5 TABLET ORAL DAILY
Qty: 90 TABLET | Refills: 3 | Status: SHIPPED | OUTPATIENT
Start: 2022-01-13 | End: 2022-07-28 | Stop reason: SDUPTHER

## 2022-01-13 NOTE — PROGRESS NOTES
Niki Antony APRN  Mayra Hays  1945 01/13/2022    Patient Active Problem List   Diagnosis   • Chronic heart failure with preserved ejection fraction (HCC)   • Essential hypertension   • Type 2 diabetes mellitus (HCC)   • Abnormal nuclear stress test with moderate to large size anteroapical and lateral wall myocardial ischemia.   • Acute renal insufficiency   • Paroxysmal atrial fibrillation (HCC)   • Ventral hernia without obstruction or gangrene   • Chronic obstructive pulmonary disease (HCC)   • Urinary tract infection without hematuria   • Former smoker       Dear Niki Antony APRN:    Subjective     History of Present Illness:    Chief Complaint   Patient presents with   • Follow-up     6 month    • Med Management     verbal        Mayra Hays is a pleasant 76 y.o. female with a past medical history significant for no known CAD with recent LHC showing normal coronary arteries.  She does have diabetes mellitus, she does not currently smoke tobacco anymore but does have 112-year pack year history where she averaged 2 to 3 packs a day for 56 years.  She also has paroxysmal atrial fibrillation anticoagulated with Eliquis, severe COPD and follows with pulmonology, essential hypertension, and dyslipidemia.  She comes in today for routine cardiology follow-up.    Tigist is with her daughter today she comes in after having left heart catheterization which did show normal coronary arteries.  She still does report some vague upper abdominal pain and left lateral pain.  She describes this as sharp and resolves on its own otherwise does not give me very many details regarding this pain.  She denies any worsening shortness of breath from baseline, dizziness, or syncope.    No Known Allergies:      Current Outpatient Medications:   •  albuterol sulfate  (90 Base) MCG/ACT inhaler, Inhale 2 puffs Every 4 (Four) Hours As Needed for Wheezing., Disp: , Rfl:   •  apixaban (ELIQUIS) 5 MG tablet tablet, Take  1 tablet by mouth Every 12 (Twelve) Hours., Disp: 180 tablet, Rfl: 3  •  atorvastatin (LIPITOR) 20 MG tablet, Take 1 tablet by mouth Daily., Disp: 90 tablet, Rfl: 3  •  Budeson-Glycopyrrol-Formoterol (Breztri Aerosphere) 160-9-4.8 MCG/ACT aerosol inhaler, Inhale 2 puffs 2 (Two) Times a Day., Disp: 10.7 g, Rfl: 8  •  bumetanide (BUMEX) 1 MG tablet, Take 1 tablet by mouth Daily As Needed (swelling)., Disp: 30 tablet, Rfl: 2  •  esomeprazole (nexIUM) 20 MG capsule, Take 1 capsule by mouth Daily., Disp: , Rfl:   •  Incruse Ellipta 62.5 MCG/INH aerosol powder , Take As Directed., Disp: , Rfl:   •  lisinopril (PRINIVIL,ZESTRIL) 5 MG tablet, Take 1 tablet by mouth Daily., Disp: 90 tablet, Rfl: 3  •  loratadine (CLARITIN) 10 MG tablet, Take 10 mg by mouth Daily As Needed for Allergies., Disp: , Rfl:   •  metFORMIN (GLUCOPHAGE) 500 MG tablet, Take 1 tablet by mouth 2 (two) times a day., Disp: , Rfl:   •  metoprolol succinate XL (TOPROL-XL) 25 MG 24 hr tablet, Take 1 tablet by mouth Daily., Disp: 90 tablet, Rfl: 3  •  O2 (OXYGEN), Inhale 2 L/min As Needed., Disp: , Rfl:   •  pantoprazole (PROTONIX) 40 MG EC tablet, Take 40 mg by mouth Daily As Needed (acid reflux)., Disp: , Rfl:   •  potassium chloride (K-DUR,KLOR-CON) 20 MEQ CR tablet, Take 20 mEq by mouth Daily., Disp: , Rfl:   •  sennosides-docusate (PERICOLACE) 8.6-50 MG per tablet, Take 2 tablets by mouth Daily As Needed for Constipation., Disp: 30 tablet, Rfl: 1  •  Stool Softener 100 MG capsule, Take 1 capsule by mouth 2 (two) times a day., Disp: , Rfl:   •  topiramate (TOPAMAX) 25 MG tablet, Take 25 mg by mouth 2 (Two) Times a Day., Disp: , Rfl:   •  Mirabegron ER (Myrbetriq) 25 MG tablet sustained-release 24 hour 24 hr tablet, Take 1 tablet by mouth Daily., Disp: 30 tablet, Rfl: 5    The following portions of the patient's history were reviewed and updated as appropriate: allergies, current medications, past family history, past medical history, past social history,  "past surgical history and problem list.    Social History     Tobacco Use   • Smoking status: Former Smoker     Packs/day: 3.00     Years: 55.00     Pack years: 165.00     Types: Cigarettes     Quit date:      Years since quittin.0   • Smokeless tobacco: Never Used   Vaping Use   • Vaping Use: Never used   Substance Use Topics   • Alcohol use: No   • Drug use: No         Objective   Vitals:    22 1051   BP: 146/66   BP Location: Right arm   Patient Position: Sitting   Cuff Size: Adult   Pulse: 61   Temp: 96.4 °F (35.8 °C)   TempSrc: Temporal   Weight: 104 kg (229 lb)   Height: 170.2 cm (67\")     Body mass index is 35.87 kg/m².    Constitutional:       General: Not in acute distress.     Appearance: Healthy appearance. Well-developed and not in distress. Not diaphoretic.   Eyes:      Conjunctiva/sclera: Conjunctivae normal.      Pupils: Pupils are equal, round, and reactive to light.   HENT:      Head: Normocephalic and atraumatic.   Neck:      Vascular: No carotid bruit or JVD.   Pulmonary:      Effort: Pulmonary effort is normal. No respiratory distress.      Breath sounds: Normal breath sounds.   Cardiovascular:      Normal rate. Regular rhythm.   Skin:     General: Skin is cool.   Neurological:      Mental Status: Alert, oriented to person, place, and time and oriented to person, place and time.         Lab Results   Component Value Date     2021    K 4.2 2021     2021    CO2 24.8 2021    BUN 22 2021    CREATININE 1.05 (H) 2021    GLUCOSE 105 (H) 2021    CALCIUM 9.4 2021    AST 19 2021    ALT 22 2021    ALKPHOS 140 (H) 2021     Lab Results   Component Value Date    CKTOTAL 14 (L) 2018     Lab Results   Component Value Date    WBC 6.70 2021    HGB 12.5 2021    HCT 41.1 2021     2021     Lab Results   Component Value Date    INR 1.00 05/15/2020    INR 0.99 2020    INR 1.02 " 06/01/2017     Lab Results   Component Value Date    MG 2.3 05/18/2020     Lab Results   Component Value Date    TSH 2.670 12/18/2019    TRIG 119 06/11/2021    HDL 53 06/11/2021    LDL 83 06/11/2021      Lab Results   Component Value Date    BNP 29.0 02/01/2019       During this visit the following were done:  Labs Reviewed []    Labs Ordered []    Radiology Reports Reviewed []    Radiology Ordered []    PCP Records Reviewed []    Referring Provider Records Reviewed []    ER Records Reviewed []    Hospital Records Reviewed []    History Obtained From Family []    Radiology Images Reviewed []    Other Reviewed []    Records Requested []         ECG 12 Lead    Date/Time: 1/13/2022 10:52 AM  Performed by: Darek Nelson PA-C  Authorized by: Darek Nelson PA-C   Comparison: compared with previous ECG   Similar to previous ECG  Rhythm: sinus rhythm  Conduction: conduction normal    Clinical impression: normal ECG            Assessment/Plan    Diagnosis Plan   1. Chronic heart failure with preserved ejection fraction (HCC)     2. Paroxysmal atrial fibrillation (HCC)     3. Essential hypertension              Recommendations:  1. Precordial pain  1. Discussed results of left heart catheterization which showed normal coronary arteries.  Because of these findings I will be stopping aspirin today and encouraged her to pursue noncardiac work-up for her symptoms.  Expressed understanding.  2. Paroxysmal atrial fibrillation  1. Denies any episodes of known A. fib such as palpitations or tachycardia.  I will continue metoprolol succinate and Eliquis for anticoagulation.  She denies any bleeding issues.      No follow-ups on file.    As always, I appreciate very much the opportunity to participate in the cardiovascular care of your patients.      With Best Regards,    Darek Nelson PA-C

## 2022-02-15 ENCOUNTER — HOSPITAL ENCOUNTER (OUTPATIENT)
Dept: GENERAL RADIOLOGY | Facility: HOSPITAL | Age: 77
Discharge: HOME | End: 2022-02-15
Admitting: NURSE PRACTITIONER

## 2022-02-15 ENCOUNTER — TELEPHONE (OUTPATIENT)
Dept: UROLOGY | Facility: CLINIC | Age: 77
End: 2022-02-15

## 2022-02-15 ENCOUNTER — OFFICE VISIT (OUTPATIENT)
Dept: PULMONOLOGY | Facility: CLINIC | Age: 77
End: 2022-02-15

## 2022-02-15 ENCOUNTER — OFFICE VISIT (OUTPATIENT)
Dept: UROLOGY | Facility: CLINIC | Age: 77
End: 2022-02-15

## 2022-02-15 VITALS
TEMPERATURE: 97.3 F | SYSTOLIC BLOOD PRESSURE: 158 MMHG | DIASTOLIC BLOOD PRESSURE: 92 MMHG | WEIGHT: 250 LBS | OXYGEN SATURATION: 96 % | HEIGHT: 67 IN | HEART RATE: 54 BPM | BODY MASS INDEX: 39.24 KG/M2

## 2022-02-15 VITALS — HEIGHT: 67 IN | BODY MASS INDEX: 39.24 KG/M2 | WEIGHT: 250 LBS

## 2022-02-15 DIAGNOSIS — R26.81 GAIT INSTABILITY: ICD-10-CM

## 2022-02-15 DIAGNOSIS — N32.81 DETRUSOR INSTABILITY OF BLADDER: ICD-10-CM

## 2022-02-15 DIAGNOSIS — R10.9 ACUTE LEFT FLANK PAIN: ICD-10-CM

## 2022-02-15 DIAGNOSIS — N39.0 URINARY TRACT INFECTION WITHOUT HEMATURIA, SITE UNSPECIFIED: Primary | ICD-10-CM

## 2022-02-15 DIAGNOSIS — R91.1 PULMONARY NODULE: ICD-10-CM

## 2022-02-15 DIAGNOSIS — Z87.891 FORMER SMOKER: ICD-10-CM

## 2022-02-15 DIAGNOSIS — G47.34 NOCTURNAL HYPOXIA: ICD-10-CM

## 2022-02-15 DIAGNOSIS — J44.9 CHRONIC OBSTRUCTIVE PULMONARY DISEASE, UNSPECIFIED COPD TYPE: Primary | ICD-10-CM

## 2022-02-15 DIAGNOSIS — R10.32 LEFT LOWER QUADRANT ABDOMINAL PAIN: ICD-10-CM

## 2022-02-15 LAB
BILIRUB BLD-MCNC: NEGATIVE MG/DL
CLARITY, POC: CLEAR
COLOR UR: YELLOW
EXPIRATION DATE: NORMAL
GLUCOSE UR STRIP-MCNC: NEGATIVE MG/DL
KETONES UR QL: NEGATIVE
LEUKOCYTE EST, POC: NEGATIVE
Lab: NORMAL
NITRITE UR-MCNC: NEGATIVE MG/ML
PH UR: 6.5 [PH] (ref 5–8)
PROT UR STRIP-MCNC: NEGATIVE MG/DL
RBC # UR STRIP: NEGATIVE /UL
SP GR UR: 1.01 (ref 1–1.03)
UROBILINOGEN UR QL: NORMAL

## 2022-02-15 PROCEDURE — 74018 RADEX ABDOMEN 1 VIEW: CPT | Performed by: RADIOLOGY

## 2022-02-15 PROCEDURE — G0008 ADMIN INFLUENZA VIRUS VAC: HCPCS | Performed by: PHYSICIAN ASSISTANT

## 2022-02-15 PROCEDURE — 74018 RADEX ABDOMEN 1 VIEW: CPT

## 2022-02-15 PROCEDURE — 81003 URINALYSIS AUTO W/O SCOPE: CPT | Performed by: NURSE PRACTITIONER

## 2022-02-15 PROCEDURE — 99214 OFFICE O/P EST MOD 30 MIN: CPT | Performed by: PHYSICIAN ASSISTANT

## 2022-02-15 PROCEDURE — 90686 IIV4 VACC NO PRSV 0.5 ML IM: CPT | Performed by: PHYSICIAN ASSISTANT

## 2022-02-15 PROCEDURE — 99214 OFFICE O/P EST MOD 30 MIN: CPT | Performed by: NURSE PRACTITIONER

## 2022-02-15 PROCEDURE — 87086 URINE CULTURE/COLONY COUNT: CPT | Performed by: NURSE PRACTITIONER

## 2022-02-15 PROCEDURE — 96372 THER/PROPH/DIAG INJ SC/IM: CPT | Performed by: NURSE PRACTITIONER

## 2022-02-15 RX ORDER — OXYBUTYNIN CHLORIDE 10 MG/1
10 TABLET, EXTENDED RELEASE ORAL DAILY
Qty: 90 TABLET | Refills: 2 | Status: SHIPPED | OUTPATIENT
Start: 2022-02-15 | End: 2022-05-16

## 2022-02-15 RX ORDER — BUDESONIDE, GLYCOPYRROLATE, AND FORMOTEROL FUMARATE 160; 9; 4.8 UG/1; UG/1; UG/1
2 AEROSOL, METERED RESPIRATORY (INHALATION) 2 TIMES DAILY
Qty: 10.7 G | Refills: 8 | Status: SHIPPED | OUTPATIENT
Start: 2022-02-15 | End: 2022-03-14

## 2022-02-15 RX ORDER — KETOROLAC TROMETHAMINE 10 MG/1
10 TABLET, FILM COATED ORAL EVERY 6 HOURS PRN
Qty: 20 TABLET | Refills: 0 | Status: SHIPPED | OUTPATIENT
Start: 2022-02-15 | End: 2022-02-15 | Stop reason: SDUPTHER

## 2022-02-15 RX ORDER — KETOROLAC TROMETHAMINE 10 MG/1
10 TABLET, FILM COATED ORAL EVERY 6 HOURS PRN
Qty: 20 TABLET | Refills: 0 | Status: SHIPPED | OUTPATIENT
Start: 2022-02-15 | End: 2023-01-06 | Stop reason: SDUPTHER

## 2022-02-15 RX ORDER — KETOROLAC TROMETHAMINE 30 MG/ML
30 INJECTION, SOLUTION INTRAMUSCULAR; INTRAVENOUS ONCE
Status: COMPLETED | OUTPATIENT
Start: 2022-02-15 | End: 2022-02-15

## 2022-02-15 RX ADMIN — KETOROLAC TROMETHAMINE 30 MG: 30 INJECTION, SOLUTION INTRAMUSCULAR; INTRAVENOUS at 12:14

## 2022-02-15 NOTE — PROGRESS NOTES
"Chief Complaint  E'COLI Frequent Uti's/OAB/LEFT FLANK PAIN (6 month fu /RECHECK URINE/MED REFILLS)    Subjective          Mayra Archer presents to [facility]  History of Present Illness    Ms. Mayra ARCHER is a very pleasant 76 year-old female who returns to clinic today for evaluation.  This is HER 6 month follow-up for E. coli recurrent UTI, acute cystitis with hematuria, and overactive bladder. She reports  doing better on antibiotic prophylaxis with Macrobid in the past, but stopped.  She also reports  A significant  improvement in her urinary symptoms until reccently.      Today, She reports  having many more episodes of frequency, urgency, and her nocturia is significantly worsened from occasional 1-2 times on Myrbetriq to now 5-6 times since stopping meds and uncontrolled late night po fluids intake..  Most important to her is the fact that she has not had any more episodes of dysuria, burning on urination, or gross hematuria. She reports significant left flank pain, LLQ abdominal pain radiating to her  Lower back pain, pelvic pain and pressure, she denies fever or chills, she does not have nausea, vomiting, or diarrhea.  Her Urine dipstick today is completely negative for any infection, it is negative for gross/microscopic hematuria. Her PVR is 33 cc    We also reviewed/discussed her KUB which is unremarkable. The bowel gas pattern shows no evidence of obstruction. No soft tissue masses or pathological calcifications other than vascular ones are demonstrated.     IMPRESSION:  Nonspecific supine view of the abdomen.      Overall she reports doing better, she states she is currently taking her probiotics diligently.  She reports  increasing her p.o. fluid intake to at least 2 L daily, and has significantly reduced her intake of soda drinks. She drinks more water and cranberry drinks.     Objective   Vital Signs:   Ht 170.2 cm (67\")   Wt 113 kg (250 lb)   BMI " 39.16 kg/m²     Physical Exam  Constitutional:       General: She is in acute distress.      Appearance: She is well-developed. She is obese. She is ill-appearing.   HENT:      Head: Normocephalic and atraumatic.   Eyes:      Pupils: Pupils are equal, round, and reactive to light.   Neck:      Thyroid: No thyromegaly.      Trachea: No tracheal deviation.   Cardiovascular:      Rate and Rhythm: Normal rate and regular rhythm.      Heart sounds: No murmur heard.      Pulmonary:      Effort: Pulmonary effort is normal. No respiratory distress.      Breath sounds: Normal breath sounds. No stridor. No wheezing.   Abdominal:      General: Bowel sounds are normal. There is distension.      Palpations: Abdomen is soft.      Tenderness: There is abdominal tenderness. There is left CVA tenderness and guarding.   Genitourinary:     Labia:         Right: No tenderness.         Left: No tenderness.       Vagina: Normal. No vaginal discharge.      Comments: Urine frequency/urgency  Musculoskeletal:         General: Tenderness present. No deformity. Normal range of motion.      Cervical back: Normal range of motion.   Skin:     General: Skin is warm and dry.      Capillary Refill: Capillary refill takes less than 2 seconds.      Coloration: Skin is pale.      Findings: No erythema or rash.   Neurological:      Mental Status: She is alert and oriented to person, place, and time.      Cranial Nerves: No cranial nerve deficit.      Sensory: No sensory deficit.      Motor: Weakness present.      Coordination: Coordination normal.   Psychiatric:         Behavior: Behavior normal.         Thought Content: Thought content normal.         Judgment: Judgment normal.        Result Review :     CMP    CMP 6/11/21 11/22/21   Glucose 102 (A) 105 (A)   BUN 15 22   Creatinine 0.97 1.05 (A)   eGFR Non  Am 56 (A) 51 (A)   Sodium 138 138   Potassium 4.4 4.2   Chloride 101 103   Calcium 9.0 9.4   Albumin 4.40    Total Bilirubin 0.2     Alkaline Phosphatase 140 (A)    AST (SGOT) 19    ALT (SGPT) 22    (A) Abnormal value            CBC w/diff    CBC w/Diff 6/11/21 11/22/21   WBC 6.56 6.70   RBC 4.73 4.72   Hemoglobin 13.0 12.5   Hematocrit 40.1 41.1   MCV 84.8 87.1   MCH 27.5 26.5 (A)   MCHC 32.4 30.4 (A)   RDW 12.6 12.6   Platelets 233 248   Neutrophil Rel %  52.1   Immature Granulocyte Rel %  0.4   Lymphocyte Rel %  30.3   Monocyte Rel %  10.0   Eosinophil Rel %  5.7   Basophil Rel %  1.5   (A) Abnormal value            Renal Profile    Renal Profile 6/11/21 11/22/21   BUN 15 22   Creatinine 0.97 1.05 (A)   eGFR Non  Am 56 (A) 51 (A)   (A) Abnormal value            UA    Urinalysis 4/27/21 7/27/21 2/15/22   Ketones, UA Negative Negative Negative   Leukocytes, UA Negative Negative Negative           Urine Culture    Urine Culture 3/23/21 4/27/21 7/27/21   Urine Culture >100,000 CFU/mL Escherichia coli (A) No growth 25,000 CFU/mL Escherichia coli (A)   (A) Abnormal value            Covid Tests    Common Labsle 11/19/21   COVID19 Not Detected           Data reviewed: Radiologic studies KUB DONE TODAY 02/15/22-UNREMARKABL          Assessment and Plan    Diagnoses and all orders for this visit:    1. Urinary tract infection without hematuria, site unspecified (Primary)  -     POC Urinalysis Dipstick, Automated  -     Urine Culture - Urine, Urine, Clean Catch  -     oxybutynin XL (Ditropan XL) 10 MG 24 hr tablet; Take 1 tablet by mouth Daily for 90 days.  Dispense: 90 tablet; Refill: 2  -     XR abdomen kub    2. Detrusor instability of bladder    3. Left lower quadrant abdominal pain  -     oxybutynin XL (Ditropan XL) 10 MG 24 hr tablet; Take 1 tablet by mouth Daily for 90 days.  Dispense: 90 tablet; Refill: 2  -     XR abdomen kub  -     Discontinue: ketorolac (TORADOL) 10 MG tablet; Take 1 tablet by mouth Every 6 (Six) Hours As Needed for Moderate Pain .  Dispense: 20 tablet; Refill: 0  -     ketorolac (TORADOL) 10 MG tablet; Take 1 tablet  by mouth Every 6 (Six) Hours As Needed for Moderate Pain .  Dispense: 20 tablet; Refill: 0  -     ketorolac (TORADOL) injection 30 mg    4. Acute left flank pain  -     oxybutynin XL (Ditropan XL) 10 MG 24 hr tablet; Take 1 tablet by mouth Daily for 90 days.  Dispense: 90 tablet; Refill: 2  -     XR abdomen kub  -     Discontinue: ketorolac (TORADOL) 10 MG tablet; Take 1 tablet by mouth Every 6 (Six) Hours As Needed for Moderate Pain .  Dispense: 20 tablet; Refill: 0  -     ketorolac (TORADOL) 10 MG tablet; Take 1 tablet by mouth Every 6 (Six) Hours As Needed for Moderate Pain .  Dispense: 20 tablet; Refill: 0      ASSESSMENT/PLAN  Recurrent urinary tract infections/OAB/LEFT FLANK PAIN: Ms Mayra Hays is a pleasant  76 year old female being evaluated in clinic today. This is her 6 month follow up  for Recurrent UTI's with urine frequency/ urgency and incontinence for which she has been on Myrbetriq 50 mg daily reports a significant improvement in her symptoms but stopped taking when samples ran out BECAUSE she was unable to afford meds.     She also reports doing relatively better on her antibiotic prophylaxis, HOWEVER, she had stopped it.  Upon evaluation today, she reports having many symptoms with urine frequency and urgency,  she reports left flank, back/abdominal pain. She does  Have left  CVA tenderness. She has no urinary symptoms of dysuria, or burning on urination. She has not had any gross hematuria. She denies N/V/D. Her Urine dipstick today is completely negative for any infection, it is negative for gross/microscopic hematuria. Her PVR is 33 cc, KUB IS ALSO NEGATIVE     Again, We discussed the risk factors for recurrent infections being intercourse in younger patients and atrophic changes in older patients.  We discussed the symptoms that are found including pain, pressure, burning, frequency, urgency suprapubic pain and painful intercourse. I discussed upper tract symptoms including fevers, chills,  and indicated the workup would be much more aggressive if the patient were to present with recurrent infections in the face of upper tract symptomatology such as fever.      Again, we discussed detrusor instability which is an  irritative bladder symptomatology most likely related to factors such as intake of bladder irritants, postinfectious irritation, prolapse, with a very large differential diagnosis.  The mainstay of treatment has been tight cholinergics which basically caused the bladder to have decreased contractility.  We have discussed the side effects of these treatments including dry mouth, double vision, and increasing constipation.  She reports doing well  on oxybutynin for symptomatic relief.      PLAN     Will RESend her urine out for culture, I will call her WITH RESULTS IF ANY positive urine culture.      I recommend concomitant probiotics  to protect the rectal reservoir including over-the-counter yogurt preparations to sincere oral pills containing the appropriate probiotics. Patient reports the diligent use of Probiotics.    Continue Mybetriq 50 mg nightly x 1 month, then transition to ditropan 10 mg XL per insurance recommendations    Discussed things she can do to help her urine frequency such as keeping the bladder diary with strict intake and output of what she eats or drinks, how often she urinates, how much she urinates.  She should follow a bladder training program, and do Keagle exercises to strengthen the muscles to help control urination.    She will follow up with PCP for abdominal /Left flank pain-she is post hernia surgery 2020     Will see her back in Six  Month for Follow up in clinic and recheck her urinalysis at that time.    May return sooner if need be.    Patient is agreeable to plan of care     Patient reports that she is not currently experiencing any symptoms of urinary incontinence.     Patient's Body mass index is 36.25 kg/m². indicating that she is obese (BMI >30).  Obesity-related health conditions include the following: hypertension and GERD. Obesity is improving with lifestyle modifications. BMI is 36.25. We discussed portion control and increasing exercise..     Follow Up   Return in about 6 months (around 8/15/2022) for RECURRENT UTI/FREQUENCY/DETRUSSOR INSTABILITY,LEFT flank pain/Next scheduled follow up, .  Patient was given instructions and counseling regarding her condition or for health maintenance advice. Please see specific information pulled into the AVS if appropriate.

## 2022-02-15 NOTE — PATIENT INSTRUCTIONS
Flank Pain, Adult  Flank pain is pain in your side. The flank is the area of your side between your upper belly (abdomen) and your back. The pain may occur over a short time (acute), or it may be long-term or come back often (chronic). It may be mild or very bad. Pain in this area can be caused by many different things.  Follow these instructions at home:    · Drink enough fluid to keep your pee (urine) clear or pale yellow.  · Rest as told by your doctor.  · Take over-the-counter and prescription medicines only as told by your doctor.  · Keep a journal to keep track of:  ? What has caused your flank pain.  ? What has made it feel better.  · Keep all follow-up visits as told by your doctor. This is important.  Contact a doctor if:  · Medicine does not help your pain.  · You have new symptoms.  · Your pain gets worse.  · You have a fever.  · Your symptoms last longer than 2-3 days.  · You have trouble peeing.  · You are peeing more often than normal.  Get help right away if:  · You have trouble breathing.  · You are short of breath.  · Your belly hurts, or it is swollen or red.  · You feel sick to your stomach (nauseous).  · You throw up (vomit).  · You feel like you will pass out, or you do pass out (faint).  · You have blood in your pee.  Summary  · Flank pain is pain in your side. The flank is the area of your side between your upper belly (abdomen) and your back.  · Flank pain may occur over a short time (acute), or it may be long-term or come back often (chronic). It may be mild or very bad.  · Pain in this area can be caused by many different things.  · Contact your doctor if your symptoms get worse or they last longer than 2-3 days.  This information is not intended to replace advice given to you by your health care provider. Make sure you discuss any questions you have with your health care provider.  Document Revised: 11/30/2018 Document Reviewed: 04/09/2018  [employer]vier Patient Education © 2021 [employer]  Urinary Frequency, Adult  Urinary frequency means urinating more often than usual. You may urinate every 1-2 hours even though you drink a normal amount of fluid and do not have a bladder infection or condition. Although you urinate more often than normal, the total amount of urine produced in a day is normal.  With urinary frequency, you may have an urgent need to urinate often. The stress and anxiety of needing to find a bathroom quickly can make this urge worse. This condition may go away on its own or you may need treatment at home. Home treatment may include bladder training, exercises, taking medicines, or making changes to your diet.  Follow these instructions at home:  Bladder health    · Keep a bladder diary if told by your health care provider. Keep track of:  ? What you eat and drink.  ? How often you urinate.  ? How much you urinate.  · Follow a bladder training program if told by your health care provider. This may include:  ? Learning to delay going to the bathroom.  ? Double urinating (voiding). This helps if you are not completely emptying your bladder.  ? Scheduled voiding.  · Do Kegel exercises as told by your health care provider. Kegel exercises strengthen the muscles that help control urination, which may help the condition.    Eating and drinking  · If told by your health care provider, make diet changes, such as:  ? Avoiding caffeine.  ? Drinking fewer fluids, especially alcohol.  ? Not drinking in the evening.  ? Avoiding foods or drinks that may irritate the bladder. These include coffee, tea, soda, artificial sweeteners, citrus, tomato-based foods, and chocolate.  ? Eating foods that help prevent or ease constipation. Constipation can make this condition worse. Your health care provider may recommend that you:  § Drink enough fluid to keep your urine pale yellow.  § Take over-the-counter or prescription medicines.  § Eat foods that are high in fiber, such as beans, whole grains, and  fresh fruits and vegetables.  § Limit foods that are high in fat and processed sugars, such as fried or sweet foods.  General instructions  · Take over-the-counter and prescription medicines only as told by your health care provider.  · Keep all follow-up visits as told by your health care provider. This is important.  Contact a health care provider if:  · You start urinating more often.  · You feel pain or irritation when you urinate.  · You notice blood in your urine.  · Your urine looks cloudy.  · You develop a fever.  · You begin vomiting.  Get help right away if:  · You are unable to urinate.  Summary  · Urinary frequency means urinating more often than usual. With urinary frequency, you may urinate every 1-2 hours even though you drink a normal amount of fluid and do not have a bladder infection or other bladder condition.  · Your health care provider may recommend that you keep a bladder diary, follow a bladder training program, or make dietary changes.  · If told by your health care provider, do Kegel exercises to strengthen the muscles that help control urination.  · Take over-the-counter and prescription medicines only as told by your health care provider.  · Contact a health care provider if your symptoms do not improve or get worse.  This information is not intended to replace advice given to you by your health care provider. Make sure you discuss any questions you have with your health care provider.  Document Revised: 06/27/2019 Document Reviewed: 06/27/2019  [employer] Patient Education © 2021 [employer]  Urinary Tract Infection, Adult    A urinary tract infection (UTI) is an infection of any part of the urinary tract. The urinary tract includes the kidneys, ureters, bladder, and urethra. These organs make, store, and get rid of urine in the body.  Your health care provider may use other names to describe the infection. An upper UTI affects the ureters and kidneys (pyelonephritis). A lower UTI  affects the bladder (cystitis) and urethra (urethritis).  What are the causes?  Most urinary tract infections are caused by bacteria in your genital area, around the entrance to your urinary tract (urethra). These bacteria grow and cause inflammation of your urinary tract.  What increases the risk?  You are more likely to develop this condition if:  · You have a urinary catheter that stays in place (indwelling).  · You are not able to control when you urinate or have a bowel movement (you have incontinence).  · You are female and you:  ? Use a spermicide or diaphragm for birth control.  ? Have low estrogen levels.  ? Are pregnant.  · You have certain genes that increase your risk (genetics).  · You are sexually active.  · You take antibiotic medicines.  · You have a condition that causes your flow of urine to slow down, such as:  ? An enlarged prostate, if you are male.  ? Blockage in your urethra (stricture).  ? A kidney stone.  ? A nerve condition that affects your bladder control (neurogenic bladder).  ? Not getting enough to drink, or not urinating often.  · You have certain medical conditions, such as:  ? Diabetes.  ? A weak disease-fighting system (immunesystem).  ? Sickle cell disease.  ? Gout.  ? Spinal cord injury.  What are the signs or symptoms?  Symptoms of this condition include:  · Needing to urinate right away (urgently).  · Frequent urination or passing small amounts of urine frequently.  · Pain or burning with urination.  · Blood in the urine.  · Urine that smells bad or unusual.  · Trouble urinating.  · Cloudy urine.  · Vaginal discharge, if you are female.  · Pain in the abdomen or the lower back.  You may also have:  · Vomiting or a decreased appetite.  · Confusion.  · Irritability or tiredness.  · A fever.  · Diarrhea.  The first symptom in older adults may be confusion. In some cases, they may not have any symptoms until the infection has worsened.  How is this diagnosed?  This condition is  diagnosed based on your medical history and a physical exam. You may also have other tests, including:  · Urine tests.  · Blood tests.  · Tests for sexually transmitted infections (STIs).  If you have had more than one UTI, a cystoscopy or imaging studies may be done to determine the cause of the infections.  How is this treated?  Treatment for this condition includes:  · Antibiotic medicine.  · Over-the-counter medicines to treat discomfort.  · Drinking enough water to stay hydrated.  If you have frequent infections or have other conditions such as a kidney stone, you may need to see a health care provider who specializes in the urinary tract (urologist).  In rare cases, urinary tract infections can cause sepsis. Sepsis is a life-threatening condition that occurs when the body responds to an infection. Sepsis is treated in the hospital with IV antibiotics, fluids, and other medicines.  Follow these instructions at home:    Medicines  · Take over-the-counter and prescription medicines only as told by your health care provider.  · If you were prescribed an antibiotic medicine, take it as told by your health care provider. Do not stop using the antibiotic even if you start to feel better.  General instructions  · Make sure you:  ? Empty your bladder often and completely. Do not hold urine for long periods of time.  ? Empty your bladder after sex.  ? Wipe from front to back after a bowel movement if you are female. Use each tissue one time when you wipe.  · Drink enough fluid to keep your urine pale yellow.  · Keep all follow-up visits as told by your health care provider. This is important.  Contact a health care provider if:  · Your symptoms do not get better after 1-2 days.  · Your symptoms go away and then return.  Get help right away if you have:  · Severe pain in your back or your lower abdomen.  · A fever.  · Nausea or vomiting.  Summary  · A urinary tract infection (UTI) is an infection of any part of the  urinary tract, which includes the kidneys, ureters, bladder, and urethra.  · Most urinary tract infections are caused by bacteria in your genital area, around the entrance to your urinary tract (urethra).  · Treatment for this condition often includes antibiotic medicines.  · If you were prescribed an antibiotic medicine, take it as told by your health care provider. Do not stop using the antibiotic even if you start to feel better.  · Keep all follow-up visits as told by your health care provider. This is important.  This information is not intended to replace advice given to you by your health care provider. Make sure you discuss any questions you have with your health care provider.  Document Revised: 12/05/2019 Document Reviewed: 06/27/2019  [employer] Patient Education © 2021 [employer]

## 2022-02-15 NOTE — PROGRESS NOTES
"Chief Complaint  COPD    Subjective          Mayra Hays presents to [facility] PULMONARY & CRITICAL CARE MEDICINE  History of Present Illness     Patient presents today for follow-up of COPD, chronic hypoxic respiratory failure with use of submental oxygen as needed and at nighttime, previously diagnosed OSWALDO, and cigarette dependence.  She states that she is doing fairly well today.  She has been continuing on Breztri, after noting more benefit as compared to Breo.  She feels that she is better able to inhale the spray medication versus the powder.  She still requires Mucinex on occasion for congestion.  She continues on supplemental oxygen most nights.  Previously unable to tolerate positive air pressure therapy.  She has not yet qualified for daytime, ambulatory therapy.  Notable for cigarette dependence with cessation since 2015.  Notable for as much as 3 pack/day average.   Patient is some difficulty with stability, likely complicated by shortness of breath.  She requested order for shower chair and Rollator as she would likely benefit from needs to avoid a fall.      Objective   Vital Signs:   /92   Pulse 54   Temp 97.3 °F (36.3 °C) (Temporal)   Ht 170.2 cm (67\")   Wt 113 kg (250 lb)   SpO2 96%   BMI 39.16 kg/m²     Physical Exam  Vitals reviewed.   Constitutional:       General: She is not in acute distress.     Appearance: She is well-developed. She is not diaphoretic.   HENT:      Head: Normocephalic and atraumatic.   Neck:      Thyroid: No thyromegaly.   Cardiovascular:      Rate and Rhythm: Normal rate and regular rhythm.      Heart sounds: Normal heart sounds, S1 normal and S2 normal.   Pulmonary:      Effort: Pulmonary effort is normal.      Breath sounds: No wheezing, rhonchi or rales.   Neurological:      Mental Status: She is alert and oriented to person, place, and time.   Psychiatric:         Behavior: Behavior normal.        Result Review :   The following data was " reviewed by: Sabiha Valadez PA-C on 02/15/2022:    Reviewed the CT chest imaging/report from November 2021.    Reviewed the CT chest imaging/report from January 2019.    Reviewed PFT from 2018.  Suggestive of severe obstruction.        Assessment and Plan    Diagnoses and all orders for this visit:    1. Chronic obstructive pulmonary disease, unspecified COPD type (HCC) (Primary)  -     Budeson-Glycopyrrol-Formoterol (Breztri Aerosphere) 160-9-4.8 MCG/ACT aerosol inhaler; Inhale 2 puffs 2 (Two) Times a Day.  Dispense: 10.7 g; Refill: 8    2. Nocturnal hypoxia    3. Former smoker    4. Pulmonary nodule    5. Gait instability  -     Miscellaneous DME  -     Miscellaneous DME    Other orders  -     FluLaval/Fluarix/Fluzone >6 Months (4709-4605)        COPD:   · Continue Albuterol inhaler as needed  · Continue Breztri as scheduled  · Use Mucinex tablets as needed for congestion        Chronic hypoxic respiratory failure:   · Remains compliant with 2 L/m at nighttime.   · Unable to tolerate previous trial of PAP therapy for OSWALDO.   · Prefer to await walk test today.  Saturation noted 96%.  She has only qualified for nighttime oxygen and not yet ambulatory supplies.       Pulmonary nodule, cigarette dependence:   Cessation achieved in 2015.  Recent CT chest showed Stable fibronodular scarring at the lung apices and an area of increased density in the left lung base against the pleura, unchanged from 2019.  Calcified granulomas also appear stable.  · We will plan for repeat low-dose CT chest in 1 year, will order closer to November 2022      Received influenza vaccination today.        Follow Up   Return in about 6 months (around 8/15/2022), or As needed.  Patient was given instructions and counseling regarding her condition or for health maintenance advice. Please see specific information pulled into the AVS if appropriate.

## 2022-02-15 NOTE — TELEPHONE ENCOUNTER
CALLED PATIENT TO CHECK ON HER POST CLINIC VISIT, AND TO DISCUSS KUB RESULTS NEGATIVE. ALSO SPOKE WITH PTS DTR SILVIA MUHAMMAD, WILL FOLLOW UP WITH PCP    COMPARISON: Previous KUB from 03/2021.  There are clips in the right upper quadrant from previous cholecystectomy. The bowel gas pattern shows no evidence of obstruction. No soft tissue masses or pathological calcifications other than vascular ones are demonstrated.     IMPRESSION:  Nonspecific supine view of the abdomen.      WILL CONTINUE CONSERVATIVE THERAPY AND FOLLOW UP AS DISCUSSED.

## 2022-02-16 LAB — BACTERIA SPEC AEROBE CULT: NORMAL

## 2022-02-21 PROBLEM — G47.34 NOCTURNAL HYPOXIA: Status: ACTIVE | Noted: 2019-04-03

## 2022-02-21 PROBLEM — R26.81 GAIT INSTABILITY: Status: ACTIVE | Noted: 2022-02-21

## 2022-02-21 PROBLEM — R91.1 PULMONARY NODULE: Status: ACTIVE | Noted: 2022-02-21

## 2022-03-14 RX ORDER — BUDESONIDE, GLYCOPYRROLATE, AND FORMOTEROL FUMARATE 160; 9; 4.8 UG/1; UG/1; UG/1
2 AEROSOL, METERED RESPIRATORY (INHALATION) 2 TIMES DAILY
Qty: 10.7 G | Refills: 8 | Status: SHIPPED | OUTPATIENT
Start: 2022-03-14 | End: 2022-06-07 | Stop reason: SDUPTHER

## 2022-06-07 ENCOUNTER — OFFICE VISIT (OUTPATIENT)
Dept: PULMONOLOGY | Facility: CLINIC | Age: 77
End: 2022-06-07

## 2022-06-07 VITALS
DIASTOLIC BLOOD PRESSURE: 92 MMHG | OXYGEN SATURATION: 92 % | BODY MASS INDEX: 39.24 KG/M2 | HEIGHT: 67 IN | SYSTOLIC BLOOD PRESSURE: 166 MMHG | HEART RATE: 49 BPM | WEIGHT: 250 LBS | TEMPERATURE: 97.7 F

## 2022-06-07 DIAGNOSIS — J44.9 CHRONIC OBSTRUCTIVE PULMONARY DISEASE, UNSPECIFIED COPD TYPE: Primary | ICD-10-CM

## 2022-06-07 DIAGNOSIS — Z87.891 FORMER SMOKER: ICD-10-CM

## 2022-06-07 DIAGNOSIS — J96.11 CHRONIC RESPIRATORY FAILURE WITH HYPOXIA: ICD-10-CM

## 2022-06-07 PROCEDURE — 99214 OFFICE O/P EST MOD 30 MIN: CPT | Performed by: PHYSICIAN ASSISTANT

## 2022-06-07 RX ORDER — BUDESONIDE, GLYCOPYRROLATE, AND FORMOTEROL FUMARATE 160; 9; 4.8 UG/1; UG/1; UG/1
2 AEROSOL, METERED RESPIRATORY (INHALATION) 2 TIMES DAILY
Qty: 10.7 G | Refills: 8 | Status: SHIPPED | OUTPATIENT
Start: 2022-06-07

## 2022-06-07 RX ORDER — IPRATROPIUM BROMIDE AND ALBUTEROL SULFATE 2.5; .5 MG/3ML; MG/3ML
3 SOLUTION RESPIRATORY (INHALATION) 4 TIMES DAILY PRN
Qty: 360 ML | Refills: 8 | Status: SHIPPED | OUTPATIENT
Start: 2022-06-07

## 2022-06-07 RX ORDER — GUAIFENESIN 600 MG/1
600 TABLET, EXTENDED RELEASE ORAL 2 TIMES DAILY PRN
Qty: 28 TABLET | Refills: 6 | Status: SHIPPED | OUTPATIENT
Start: 2022-06-07 | End: 2022-06-21

## 2022-06-07 RX ORDER — ALBUTEROL SULFATE 90 UG/1
2 AEROSOL, METERED RESPIRATORY (INHALATION) EVERY 4 HOURS PRN
Qty: 18 G | Refills: 8 | Status: SHIPPED | OUTPATIENT
Start: 2022-06-07

## 2022-06-07 NOTE — PROGRESS NOTES
"Chief Complaint  COPD    Subjective        Mayra Hays presents to [facility] PULMONARY & CRITICAL CARE MEDICINE  History of Present Illness     Mrs. Hays presents today for follow up of COPD, nocturnal hypoxia, and former smoking history, and pulmonary nodules.   She continues with use of Breztri for maintenance of COPD symptoms. She has not experienced frequent wheezing, coughing with use. No acute respiratory symptoms today.   She remains compliant with nocturnal oxygen use of 2 L/m.  Also requires intermittent daytime/exertional use. She has difficulty ambulating with the tanks, and requested a POC device order.   Also states that she may need a new nebulizer device, as hers I not working properly.   She remains a former smoker.         Objective   Vital Signs:  /92   Pulse (!) 49   Temp 97.7 °F (36.5 °C) (Temporal)   Ht 170.2 cm (67\")   Wt 113 kg (250 lb)   SpO2 92%   BMI 39.16 kg/m²   Estimated body mass index is 39.16 kg/m² as calculated from the following:    Height as of this encounter: 170.2 cm (67\").    Weight as of this encounter: 113 kg (250 lb).  Reassess pulse. HR: 58 while seated.          Physical Exam  Vitals reviewed.   Constitutional:       General: She is not in acute distress.     Appearance: She is well-developed. She is not diaphoretic.   HENT:      Head: Normocephalic and atraumatic.   Cardiovascular:      Rate and Rhythm: Normal rate and regular rhythm.      Heart sounds: Normal heart sounds, S1 normal and S2 normal.   Pulmonary:      Effort: Pulmonary effort is normal.      Breath sounds: No wheezing, rhonchi or rales.   Neurological:      Mental Status: She is alert and oriented to person, place, and time.   Psychiatric:         Behavior: Behavior normal.        Result Review :  The following data was reviewed by: Sabiha Valadez PA-C on 06/07/2022:    Reviewed the low dose CT chest from November 2021.     Reviewed the echo from 2021.     Reviewed " previous spirometry report.     Assessment and Plan   Diagnoses and all orders for this visit:    1. Chronic obstructive pulmonary disease, unspecified COPD type ([facility]) (Primary)  -     Budeson-Glycopyrrol-Formoterol (Breztri Aerosphere) 160-9-4.8 MCG/ACT aerosol inhaler; Inhale 2 puffs 2 (Two) Times a Day.  Dispense: 10.7 g; Refill: 8  -     albuterol sulfate  (90 Base) MCG/ACT inhaler; Inhale 2 puffs Every 4 (Four) Hours As Needed for Wheezing or Shortness of Air.  Dispense: 18 g; Refill: 8  -     guaiFENesin (Mucinex) 600 MG 12 hr tablet; Take 1 tablet by mouth 2 (Two) Times a Day As Needed for Cough or Congestion for up to 14 days.  Dispense: 28 tablet; Refill: 6  -     ipratropium-albuterol (DUO-NEB) 0.5-2.5 mg/3 ml nebulizer; Take 3 mL by nebulization 4 (Four) Times a Day As Needed for Wheezing or Shortness of Air.  Dispense: 360 mL; Refill: 8  -     Oxygen Therapy    2. Former smoker    3. Chronic respiratory failure with hypoxia ([facility])  -     Oxygen Therapy          COPD:   · Continue Albuterol inhaler as needed  · Refilled Duonebs for as needed use  · Continue Breztri as scheduled  · Recommend Mucinex tablets as needed for congestion  · Will check with DME, needs new nebulizer machine (old one malfunctioned)       Chronic hypoxic respiratory failure:   · Remains compliant with 2 L/m at nighttime, and as needed use.   · Unable to tolerate previous trial of PAP therapy previously  · Ordered POC device for use on 2 L/m.        Pulmonary nodules, cigarette dependence:   Cessation achieved in 2015.  Recent CT chest showed Stable fibronodular scarring at the lung apices and an area of increased density in the left lung base against the pleura, unchanged from 2019.  Calcified granulomas also appear stable.  · We will plan for repeat low-dose CT chest in 1 year, will order closer to November 2022      Follow Up   Return in about 6 months (around 12/7/2022), or if symptoms worsen or fail to improve, for Next  scheduled follow up.  Patient was given instructions and counseling regarding her condition or for health maintenance advice. Please see specific information pulled into the AVS if appropriate.

## 2022-06-19 PROBLEM — J96.11 CHRONIC RESPIRATORY FAILURE WITH HYPOXIA (HCC): Status: ACTIVE | Noted: 2022-06-19

## 2022-07-20 ENCOUNTER — TELEPHONE (OUTPATIENT)
Dept: CARDIOLOGY | Facility: CLINIC | Age: 77
End: 2022-07-20

## 2022-07-28 ENCOUNTER — OFFICE VISIT (OUTPATIENT)
Dept: CARDIOLOGY | Facility: CLINIC | Age: 77
End: 2022-07-28

## 2022-07-28 ENCOUNTER — LAB (OUTPATIENT)
Dept: LAB | Facility: HOSPITAL | Age: 77
End: 2022-07-28

## 2022-07-28 VITALS
HEART RATE: 57 BPM | SYSTOLIC BLOOD PRESSURE: 190 MMHG | RESPIRATION RATE: 16 BRPM | DIASTOLIC BLOOD PRESSURE: 89 MMHG | BODY MASS INDEX: 35.16 KG/M2 | WEIGHT: 224 LBS | HEIGHT: 67 IN

## 2022-07-28 DIAGNOSIS — I10 ESSENTIAL HYPERTENSION: ICD-10-CM

## 2022-07-28 DIAGNOSIS — I48.0 PAROXYSMAL ATRIAL FIBRILLATION: ICD-10-CM

## 2022-07-28 DIAGNOSIS — I50.32 CHRONIC HEART FAILURE WITH PRESERVED EJECTION FRACTION: ICD-10-CM

## 2022-07-28 DIAGNOSIS — I50.32 CHRONIC HEART FAILURE WITH PRESERVED EJECTION FRACTION: Primary | ICD-10-CM

## 2022-07-28 PROCEDURE — 80061 LIPID PANEL: CPT

## 2022-07-28 PROCEDURE — 36415 COLL VENOUS BLD VENIPUNCTURE: CPT

## 2022-07-28 PROCEDURE — 99214 OFFICE O/P EST MOD 30 MIN: CPT | Performed by: PHYSICIAN ASSISTANT

## 2022-07-28 PROCEDURE — 80053 COMPREHEN METABOLIC PANEL: CPT

## 2022-07-28 PROCEDURE — 93000 ELECTROCARDIOGRAM COMPLETE: CPT | Performed by: PHYSICIAN ASSISTANT

## 2022-07-28 RX ORDER — LISINOPRIL 5 MG/1
5 TABLET ORAL DAILY
Qty: 90 TABLET | Refills: 3 | Status: SHIPPED | OUTPATIENT
Start: 2022-07-28 | End: 2022-07-28

## 2022-07-28 RX ORDER — LISINOPRIL 5 MG/1
5 TABLET ORAL DAILY
Qty: 90 TABLET | Refills: 3 | Status: SHIPPED | OUTPATIENT
Start: 2022-07-28

## 2022-07-28 RX ORDER — SPIRONOLACTONE 25 MG/1
25 TABLET ORAL DAILY
Qty: 90 TABLET | Refills: 3 | Status: SHIPPED | OUTPATIENT
Start: 2022-07-28

## 2022-07-28 NOTE — PROGRESS NOTES
Niki Antony APRN  Mayra Hays  1945 07/28/2022    Patient Active Problem List   Diagnosis   • Chronic heart failure with preserved ejection fraction (HCC)   • Essential hypertension   • Type 2 diabetes mellitus (HCC)   • Abnormal nuclear stress test with moderate to large size anteroapical and lateral wall myocardial ischemia.   • Acute renal insufficiency   • Paroxysmal atrial fibrillation (HCC)   • Nocturnal hypoxia   • Ventral hernia without obstruction or gangrene   • Chronic obstructive pulmonary disease (HCC)   • Urinary tract infection without hematuria   • Former smoker   • Gait instability   • Pulmonary nodule   • Chronic respiratory failure with hypoxia (HCC)       Dear Niki Antony APRN:    Subjective     History of Present Illness:    Chief Complaint   Patient presents with   • Congestive Heart Failure     6 mos follow   • Chest Pain     some   • Med Management     Called pharmacy       Mayra Hays is a pleasant 77 y.o. female with a past medical history significant for  no known CAD with recent LHC showing normal coronary arteries.  She does have diabetes mellitus, she does not currently smoke tobacco anymore but does have 112-year pack year history where she averaged 2 to 3 packs a day for 56 years.  She also has paroxysmal atrial fibrillation anticoagulated with Eliquis, severe COPD and follows with pulmonology, essential hypertension, and dyslipidemia.  She comes in today for routine cardiology follow-up.    Lucy comes in today reporting some chest pain and left and right arm numbness.  She does report that this is worse when she lays on her arms.  Her blood pressure is markedly elevated in the office today.  Unfortunately seems that she has not been getting her lisinopril, metoprolol, or Bumex.  She has been able to get her Eliquis and Lipitor and denies any bleeding issues with this.  She reports that the pharmacy had not been feeling the other medications.        No Known  Allergies:      Current Outpatient Medications:   •  albuterol sulfate  (90 Base) MCG/ACT inhaler, Inhale 2 puffs Every 4 (Four) Hours As Needed for Wheezing or Shortness of Air., Disp: 18 g, Rfl: 8  •  apixaban (ELIQUIS) 5 MG tablet tablet, Take 1 tablet by mouth Every 12 (Twelve) Hours., Disp: 180 tablet, Rfl: 3  •  atorvastatin (LIPITOR) 20 MG tablet, Take 1 tablet by mouth Daily., Disp: 90 tablet, Rfl: 3  •  Budeson-Glycopyrrol-Formoterol (Breztri Aerosphere) 160-9-4.8 MCG/ACT aerosol inhaler, Inhale 2 puffs 2 (Two) Times a Day., Disp: 10.7 g, Rfl: 8  •  esomeprazole (nexIUM) 20 MG capsule, Take 1 capsule by mouth Daily., Disp: , Rfl:   •  ipratropium-albuterol (DUO-NEB) 0.5-2.5 mg/3 ml nebulizer, Take 3 mL by nebulization 4 (Four) Times a Day As Needed for Wheezing or Shortness of Air., Disp: 360 mL, Rfl: 8  •  lisinopril (PRINIVIL,ZESTRIL) 5 MG tablet, Take 1 tablet by mouth Daily., Disp: 90 tablet, Rfl: 3  •  metFORMIN (GLUCOPHAGE) 500 MG tablet, Take 1 tablet by mouth 2 (two) times a day., Disp: , Rfl:   •  pantoprazole (PROTONIX) 40 MG EC tablet, Take 40 mg by mouth Daily As Needed (acid reflux)., Disp: , Rfl:   •  ketorolac (TORADOL) 10 MG tablet, Take 1 tablet by mouth Every 6 (Six) Hours As Needed for Moderate Pain ., Disp: 20 tablet, Rfl: 0  •  loratadine (CLARITIN) 10 MG tablet, Take 10 mg by mouth Daily As Needed for Allergies., Disp: , Rfl:   •  O2 (OXYGEN), Inhale 2 L/min As Needed., Disp: , Rfl:   •  potassium chloride (K-DUR,KLOR-CON) 20 MEQ CR tablet, Take 20 mEq by mouth Daily., Disp: , Rfl:   •  spironolactone (ALDACTONE) 25 MG tablet, Take 1 tablet by mouth Daily., Disp: 90 tablet, Rfl: 3  •  Stool Softener 100 MG capsule, Take 1 capsule by mouth 2 (two) times a day., Disp: , Rfl:   •  topiramate (TOPAMAX) 25 MG tablet, Take 25 mg by mouth 2 (Two) Times a Day., Disp: , Rfl:     The following portions of the patient's history were reviewed and updated as appropriate: allergies, current  "medications, past family history, past medical history, past social history, past surgical history and problem list.    Social History     Tobacco Use   • Smoking status: Former Smoker     Packs/day: 3.00     Types: Cigarettes     Quit date:      Years since quittin.5   • Smokeless tobacco: Never Used   Vaping Use   • Vaping Use: Never used   Substance Use Topics   • Alcohol use: No   • Drug use: No         Objective   Vitals:    22 0933   BP: (!) 190/89   Pulse: 57   Resp: 16   Weight: 102 kg (224 lb)   Height: 170.2 cm (67\")     Body mass index is 35.08 kg/m².    Constitutional:       General: Not in acute distress.     Appearance: Healthy appearance. Well-developed and not in distress. Not diaphoretic.   Eyes:      Conjunctiva/sclera: Conjunctivae normal.      Pupils: Pupils are equal, round, and reactive to light.   HENT:      Head: Normocephalic and atraumatic.   Neck:      Vascular: No carotid bruit or JVD.   Pulmonary:      Effort: Pulmonary effort is normal. No respiratory distress.      Breath sounds: Normal breath sounds.   Cardiovascular:      Normal rate. Regular rhythm.   Skin:     General: Skin is cool.   Neurological:      Mental Status: Alert, oriented to person, place, and time and oriented to person, place and time.         Lab Results   Component Value Date     2021    K 4.2 2021     2021    CO2 24.8 2021    BUN 22 2021    CREATININE 1.05 (H) 2021    GLUCOSE 105 (H) 2021    CALCIUM 9.4 2021    AST 19 2021    ALT 22 2021    ALKPHOS 140 (H) 2021     Lab Results   Component Value Date    CKTOTAL 14 (L) 2018     Lab Results   Component Value Date    WBC 6.70 2021    HGB 12.5 2021    HCT 41.1 2021     2021     Lab Results   Component Value Date    INR 1.00 05/15/2020    INR 0.99 2020    INR 1.02 2017     Lab Results   Component Value Date    MG 2.3 2020 "     Lab Results   Component Value Date    TSH 2.670 12/18/2019    TRIG 119 06/11/2021    HDL 53 06/11/2021    LDL 83 06/11/2021      Lab Results   Component Value Date    BNP 29.0 02/01/2019       During this visit the following were done:  Labs Reviewed []    Labs Ordered []    Radiology Reports Reviewed []    Radiology Ordered []    PCP Records Reviewed []    Referring Provider Records Reviewed []    ER Records Reviewed []    Hospital Records Reviewed []    History Obtained From Family []    Radiology Images Reviewed []    Other Reviewed []    Records Requested []         ECG 12 Lead    Date/Time: 7/28/2022 9:43 AM  Performed by: Darek Nelson PA-C  Authorized by: Darek Nelson PA-C   Comparison: compared with previous ECG   Similar to previous ECG  Rhythm: sinus bradycardia  Rhythm comments: rate of 53  Conduction: non-specific intraventricular conduction delay    Clinical impression: non-specific ECG          Assessment & Plan    Diagnosis Plan   1. Chronic heart failure with preserved ejection fraction (HCC)  Comprehensive Metabolic Panel    Lipid Panel   2. Paroxysmal atrial fibrillation (HCC)     3. Essential hypertension            Recommendations:  1. Essential hypertension, uncontrolled  1. I will reinitiate her lisinopril 5 mg daily and start spironolactone 25 mg daily she also has diastolic dysfunction.  2. I asked her check CMP and lipid panel today.  2. Paroxysmal atrial fibrillation  1. Maintaining sinus rhythm today we will continue Eliquis for anticoagulation.  Patient has not been getting metoprolol however she is bradycardic today with a rate of 53 bpm so we will hold off on reinitiating this for now.  3. Chest pain  1. Atypical with pain being made worse when she lays on her arms.  She did have left heart catheterization in November 2021 which showed normal coronaries leading to very low suspicion for cardiac etiology.  Encouraged her to pursue noncardiac work-up.      Return in about 3  months (around 10/28/2022).    As always, I appreciate very much the opportunity to participate in the cardiovascular care of your patients.      With Best Regards,    Darek Nelson PA-C

## 2022-07-29 LAB
ALBUMIN SERPL-MCNC: 4.5 G/DL (ref 3.5–5.2)
ALBUMIN/GLOB SERPL: 1.6 G/DL
ALP SERPL-CCNC: 119 U/L (ref 39–117)
ALT SERPL W P-5'-P-CCNC: 13 U/L (ref 1–33)
ANION GAP SERPL CALCULATED.3IONS-SCNC: 12.7 MMOL/L (ref 5–15)
AST SERPL-CCNC: 16 U/L (ref 1–32)
BILIRUB SERPL-MCNC: 0.4 MG/DL (ref 0–1.2)
BUN SERPL-MCNC: 18 MG/DL (ref 8–23)
BUN/CREAT SERPL: 18.8 (ref 7–25)
CALCIUM SPEC-SCNC: 9.6 MG/DL (ref 8.6–10.5)
CHLORIDE SERPL-SCNC: 99 MMOL/L (ref 98–107)
CHOLEST SERPL-MCNC: 167 MG/DL (ref 0–200)
CO2 SERPL-SCNC: 27.3 MMOL/L (ref 22–29)
CREAT SERPL-MCNC: 0.96 MG/DL (ref 0.57–1)
EGFRCR SERPLBLD CKD-EPI 2021: 61.4 ML/MIN/1.73
GLOBULIN UR ELPH-MCNC: 2.9 GM/DL
GLUCOSE SERPL-MCNC: 87 MG/DL (ref 65–99)
HDLC SERPL-MCNC: 52 MG/DL (ref 40–60)
LDLC SERPL CALC-MCNC: 96 MG/DL (ref 0–100)
LDLC/HDLC SERPL: 1.8 {RATIO}
POTASSIUM SERPL-SCNC: 4.4 MMOL/L (ref 3.5–5.2)
PROT SERPL-MCNC: 7.4 G/DL (ref 6–8.5)
SODIUM SERPL-SCNC: 139 MMOL/L (ref 136–145)
TRIGL SERPL-MCNC: 106 MG/DL (ref 0–150)
VLDLC SERPL-MCNC: 19 MG/DL (ref 5–40)

## 2022-09-06 ENCOUNTER — OFFICE VISIT (OUTPATIENT)
Dept: UROLOGY | Facility: CLINIC | Age: 77
End: 2022-09-06

## 2022-09-06 VITALS — WEIGHT: 224 LBS | HEIGHT: 67 IN | BODY MASS INDEX: 35.16 KG/M2

## 2022-09-06 DIAGNOSIS — R35.0 FREQUENCY OF MICTURITION: Primary | ICD-10-CM

## 2022-09-06 DIAGNOSIS — N32.81 DETRUSOR INSTABILITY OF BLADDER: ICD-10-CM

## 2022-09-06 DIAGNOSIS — R10.9 ACUTE LEFT FLANK PAIN: ICD-10-CM

## 2022-09-06 DIAGNOSIS — N39.0 URINARY TRACT INFECTION WITHOUT HEMATURIA, SITE UNSPECIFIED: ICD-10-CM

## 2022-09-06 DIAGNOSIS — R10.32 LEFT LOWER QUADRANT ABDOMINAL PAIN: ICD-10-CM

## 2022-09-06 LAB
BILIRUB BLD-MCNC: NEGATIVE MG/DL
CLARITY, POC: CLEAR
COLOR UR: YELLOW
EXPIRATION DATE: NORMAL
GLUCOSE UR STRIP-MCNC: NEGATIVE MG/DL
KETONES UR QL: NEGATIVE
LEUKOCYTE EST, POC: NEGATIVE
Lab: NORMAL
NITRITE UR-MCNC: NEGATIVE MG/ML
PH UR: 6 [PH] (ref 5–8)
PROT UR STRIP-MCNC: NEGATIVE MG/DL
RBC # UR STRIP: NEGATIVE /UL
SP GR UR: 1.01 (ref 1–1.03)
UROBILINOGEN UR QL: NORMAL

## 2022-09-06 PROCEDURE — 87086 URINE CULTURE/COLONY COUNT: CPT | Performed by: NURSE PRACTITIONER

## 2022-09-06 PROCEDURE — 99214 OFFICE O/P EST MOD 30 MIN: CPT | Performed by: NURSE PRACTITIONER

## 2022-09-06 PROCEDURE — 87186 SC STD MICRODIL/AGAR DIL: CPT | Performed by: NURSE PRACTITIONER

## 2022-09-06 PROCEDURE — 81003 URINALYSIS AUTO W/O SCOPE: CPT | Performed by: NURSE PRACTITIONER

## 2022-09-06 PROCEDURE — 87088 URINE BACTERIA CULTURE: CPT | Performed by: NURSE PRACTITIONER

## 2022-09-06 RX ORDER — TRIMETHOPRIM 100 MG/1
100 TABLET ORAL 2 TIMES DAILY
Qty: 30 TABLET | Refills: 1 | Status: SHIPPED | OUTPATIENT
Start: 2022-09-06

## 2022-09-06 RX ORDER — PHENAZOPYRIDINE HYDROCHLORIDE 200 MG/1
200 TABLET, FILM COATED ORAL 3 TIMES DAILY PRN
Qty: 20 TABLET | Refills: 0 | Status: SHIPPED | OUTPATIENT
Start: 2022-09-06

## 2022-09-06 NOTE — PROGRESS NOTES
"Chief Complaint  Urinary Tract Infection (6 month follow up, STILL FEELS LIKE SHE HAS UTI/FREQ/ABD/BACK PAIN)    Subjective          Mayra Archer presents to [facility] for   History of Present Illness    Ms. Mayra ARCHER is a very pleasant 77 year-old female who returns to clinic today for evaluation.  This is HER 6 month follow-up for E. coli recurrent UTI, acute cystitis with hematuria, and overactive bladder. She reports  doing better on antibiotic prophylaxis with Macrobid in the past, but stopped.  She also reports  A significant  improvement in her urinary symptoms until reccently.      Today, She reports  having many more episodes of right lower quadrant abdominal pain, and lower back pain.  She reports pelvic pressure and suprapubic discomfort, but however denies having any urinary symptoms of frequency, urgency, or dysuria.  Denies any burning on urination, she denies any urinary incontinent episodes.  Does not wear any pads or depends currently.  She still has nocturia occasionally 1-2 times due to her uncontrolled late night po fluids intake, she denies fever or chills, she does not have nausea, vomiting, or diarrhea.  Her Urine dipstick today is completely negative for any infection, it is negative for gross/microscopic hematuria. Her PVR is 23cc     We also reviewed/discussed her KUB which is unremarkable. The bowel gas pattern shows no evidence of obstruction. No soft tissue masses or pathological calcifications other than vascular ones are demonstrated.     IMPRESSION:  Nonspecific supine view of the abdomen.      Overall she reports doing better, she states she is currently taking her probiotics diligently.  She reports  increasing her p.o. fluid intake to at least 2 L daily, and has significantly reduced her intake of soda drinks. She drinks more water and cranberry drinks.     Objective   Vital Signs:   Ht 170.2 cm (67\")   Wt 102 kg (224 lb)   BMI " 35.08 kg/m²       ROS:   Review of Systems   Constitutional: Positive for activity change, fatigue and unexpected weight loss. Negative for appetite change, chills, diaphoresis, fever and unexpected weight gain.   HENT: Negative for congestion, ear discharge, ear pain, nosebleeds, rhinorrhea, sinus pressure and sore throat.    Eyes: Negative for blurred vision, double vision, photophobia, pain, redness and visual disturbance.   Respiratory: Negative for apnea, cough, chest tightness, shortness of breath, wheezing and stridor.    Cardiovascular: Negative for chest pain and palpitations.   Gastrointestinal: Positive for abdominal distention, abdominal pain, constipation, diarrhea, nausea and GERD. Negative for vomiting.   Endocrine: Negative for polydipsia, polyphagia and polyuria.   Genitourinary: Positive for flank pain, pelvic pain, pelvic pressure and urgency. Negative for decreased urine volume, difficulty urinating, dyspareunia, genital sores, urinary incontinence and vaginal discharge.   Musculoskeletal: Positive for back pain. Negative for arthralgias and joint swelling.   Skin: Negative for pallor, rash and wound.   Neurological: Negative for dizziness, tremors, syncope, weakness, light-headedness, headache, memory problem and confusion.   Psychiatric/Behavioral: Positive for sleep disturbance and stress. Negative for behavioral problems and decreased concentration.        Physical Exam  Constitutional:       General: She is in acute distress.      Appearance: She is well-developed. She is obese. She is ill-appearing.   HENT:      Head: Normocephalic and atraumatic.   Eyes:      Pupils: Pupils are equal, round, and reactive to light.   Neck:      Thyroid: No thyromegaly.      Trachea: No tracheal deviation.   Cardiovascular:      Rate and Rhythm: Normal rate and regular rhythm.      Heart sounds: No murmur heard.  Pulmonary:      Effort: Pulmonary effort is normal. No respiratory distress.      Breath sounds:  Normal breath sounds. No stridor. No wheezing.   Abdominal:      General: Bowel sounds are normal. There is distension.      Palpations: Abdomen is soft.      Tenderness: There is abdominal tenderness. There is right CVA tenderness and guarding.   Genitourinary:     Labia:         Right: No tenderness.         Left: No tenderness.       Vagina: Normal. No vaginal discharge.   Musculoskeletal:         General: Tenderness present. No deformity. Injury:   Lower back and right flank. Normal range of motion.      Cervical back: Normal range of motion.   Skin:     General: Skin is warm and dry.      Capillary Refill: Capillary refill takes less than 2 seconds.      Coloration: Skin is not pale.      Findings: Erythema and rash (  Butterfly rash bilateral cheeks) present.   Neurological:      Mental Status: She is alert and oriented to person, place, and time.      Cranial Nerves: No cranial nerve deficit.      Sensory: No sensory deficit.      Motor: Weakness present.      Coordination: Coordination normal.   Psychiatric:         Behavior: Behavior normal.         Thought Content: Thought content normal.         Judgment: Judgment normal.        Result Review :                     Assessment and Plan    Problem List Items Addressed This Visit        Genitourinary and Reproductive     Urinary tract infection without hematuria    Relevant Medications    phenazopyridine (Pyridium) 200 MG tablet    trimethoprim (TRIMPEX) 100 MG tablet      Other Visit Diagnoses     Frequency of micturition    -  Primary    Relevant Medications    phenazopyridine (Pyridium) 200 MG tablet    trimethoprim (TRIMPEX) 100 MG tablet    Other Relevant Orders    POC Urinalysis Dipstick, Automated (Completed)    Urine Culture - Urine, Urine, Clean Catch    Acute left flank pain        Relevant Medications    phenazopyridine (Pyridium) 200 MG tablet    trimethoprim (TRIMPEX) 100 MG tablet    Left lower quadrant abdominal pain        Relevant Medications     phenazopyridine (Pyridium) 200 MG tablet    trimethoprim (TRIMPEX) 100 MG tablet    Detrusor instability of bladder        Relevant Medications    phenazopyridine (Pyridium) 200 MG tablet    trimethoprim (TRIMPEX) 100 MG tablet          Patient reports that she is not currently experiencing any symptoms of urinary incontinence.    Class 2 Severe Obesity (BMI >=35 and <=39.9). Obesity-related health conditions include the following: obstructive sleep apnea, hypertension, coronary heart disease and diabetes mellitus. Obesity is improving with lifestyle modifications. BMI is 35.08MG/M2. We discussed portion control and increasing exercise.    RADIOLOGY (CT AND/OR KUB):    CT Abdomen and Pelvis: No results found for this or any previous visit.     CT Stone Protocol: Results for orders placed during the hospital encounter of 11/07/18    CT Abdomen Pelvis Stone Protocol    Narrative  CT ABDOMEN PELVIS STONE PROTOCOL-    TECHNIQUE: Multiple axial CT images were obtained from lung bases  through pubic symphysis without administration of IV contrast.  Reformatted images in the coronal and/or sagittal plane(s) were  generated from the axial data set to facilitate diagnostic accuracy  and/or surgical planning.  Oral Contrast:NONE.    Radiation dose reduction techniques were utilized per ALARA protocol.  Automated exposure control was initiated through either or CareDose or  DoseRight software packages by  protocol.    991.66 mGy.cm    Clinical information  flank pain    Comparison  NONE.    Findings  LOWER THORAX: Clear. No effusions.    ABDOMEN:    LIVER: Homogeneous. No focal hepatic mass or ductal dilatation.    GALLBLADDER: CHOLECYSTECTOMY CLIPS.    PANCREAS: Unremarkable. No mass or ductal dilatation.    SPLEEN: Homogeneous. No splenomegaly.    ADRENALS: No mass.    KIDNEYS: No mass. No obstructive uropathy.  No evidence of  urolithiasis.    GI TRACT: Non-dilated. No definite wall thickening.    PERITONEUM:  No free air. No free fluid or loculated fluid  collections.    MESENTERY: Unremarkable.    LYMPH NODES: No lymphadenopathy.    VASCULATURE: No evidence of aneurysm.    ABDOMINAL WALL: PARAUMBILICAL HERNIA WITH ORIFICE OF ABOUT 1 CM  CONTAINING A SMALL BOWEL LOOP WHICH DOES NOT APPEAR TO BE INCARCERATED  AT THIS TIME.    OTHER: None.    PELVIS:    BLADDER: No focal mass or significant wall thickening    REPRODUCTIVE: Unremarkable as visualized.    APPENDIX: Nondistended. No surrounding inflammation.    BONES: No acute bony abnormality.    Impression  Impression:  Paraumbilical hernia with orifice of about 1 cm containing a small bowel  loop which does not appear to be incarcerated at this time.    This report was finalized on 11/8/2018 6:57 AM by Dr. Bill Parham MD.     KUB: Results for orders placed in visit on 02/15/22    XR abdomen kub    Narrative  X-RAY ABDOMEN KUB    REASON FOR EXAM:  Left flank pain; N39.0-Urinary tract infection, site  not specified; R10.32-Left lower quadrant pain; R10.9-Unspecified  abdominal pain.    COMPARISON: Previous KUB from 03/2021.  There are clips in the right  upper quadrant from previous cholecystectomy. The bowel gas pattern  shows no evidence of obstruction. No soft tissue masses or pathological  calcifications other than vascular ones are demonstrated.    Impression  Nonspecific supine view of the abdomen.    This report was finalized on 2/15/2022 1:51 PM by Dr. Jose Manuel Ruiz II, MD.       LABS (3 MONTHS):    Office Visit on 09/06/2022   Component Date Value Ref Range Status   • Color 09/06/2022 Yellow  Yellow, Straw, Dark Yellow, Suzanne Final   • Clarity, UA 09/06/2022 Clear  Clear Final   • Specific Gravity  09/06/2022 1.010  1.005 - 1.030 Final   • pH, Urine 09/06/2022 6.0  5.0 - 8.0 Final   • Leukocytes 09/06/2022 Negative  Negative Final   • Nitrite, UA 09/06/2022 Negative  Negative Final   • Protein, POC 09/06/2022 Negative  Negative mg/dL Final   • Glucose, UA  09/06/2022 Negative  Negative mg/dL Final   • Ketones, UA 09/06/2022 Negative  Negative Final   • Urobilinogen, UA 09/06/2022 Normal  Normal, 0.2 E.U./dL Final   • Bilirubin 09/06/2022 Negative  Negative Final   • Blood, UA 09/06/2022 Negative  Negative Final   • Lot Number 09/06/2022 [id number]   Final   • Expiration Date 09/06/2022 12/21/2023   Final   Lab on 07/28/2022   Component Date Value Ref Range Status   • Glucose 07/28/2022 87  65 - 99 mg/dL Final   • BUN 07/28/2022 18  8 - 23 mg/dL Final   • Creatinine 07/28/2022 0.96  0.57 - 1.00 mg/dL Final   • Sodium 07/28/2022 139  136 - 145 mmol/L Final   • Potassium 07/28/2022 4.4  3.5 - 5.2 mmol/L Final   • Chloride 07/28/2022 99  98 - 107 mmol/L Final   • CO2 07/28/2022 27.3  22.0 - 29.0 mmol/L Final   • Calcium 07/28/2022 9.6  8.6 - 10.5 mg/dL Final   • Total Protein 07/28/2022 7.4  6.0 - 8.5 g/dL Final   • Albumin 07/28/2022 4.50  3.50 - 5.20 g/dL Final   • ALT (SGPT) 07/28/2022 13  1 - 33 U/L Final   • AST (SGOT) 07/28/2022 16  1 - 32 U/L Final   • Alkaline Phosphatase 07/28/2022 119 (A) 39 - 117 U/L Final   • Total Bilirubin 07/28/2022 0.4  0.0 - 1.2 mg/dL Final   • Globulin 07/28/2022 2.9  gm/dL Final   • A/G Ratio 07/28/2022 1.6  g/dL Final   • BUN/Creatinine Ratio 07/28/2022 18.8  7.0 - 25.0 Final   • Anion Gap 07/28/2022 12.7  5.0 - 15.0 mmol/L Final   • eGFR 07/28/2022 61.4  >60.0 mL/min/1.73 Final    National Kidney Foundation and American Society of Nephrology (ASN) Task Force recommended calculation based on the Chronic Kidney Disease Epidemiology Collaboration (CKD-EPI) equation refit without adjustment for race.   • Total Cholesterol 07/28/2022 167  0 - 200 mg/dL Final   • Triglycerides 07/28/2022 106  0 - 150 mg/dL Final   • HDL Cholesterol 07/28/2022 52  40 - 60 mg/dL Final   • LDL Cholesterol  07/28/2022 96  0 - 100 mg/dL Final   • VLDL Cholesterol 07/28/2022 19  5 - 40 mg/dL Final   • LDL/HDL Ratio 07/28/2022 1.80   Final           ASSESSMENT         ACUTE CYSTITIS/RECURRENT UTI/ALANK PAIN/ABDOMINAL PAIN  Ms. Mayra ARCHER is a very pleasant 77 year-old female who returns to clinic today for evaluation.  This is HER 6 month follow-up for E. coli recurrent UTI, acute cystitis with hematuria, and overactive bladder.  Patient was on suppressive therapy with Macrobid daily post anticholinergic Myrbetriq but stopped.  Today, She reports  having many more episodes of right lower quadrant abdominal pain, and lower back pain.  She reports pelvic pressure and suprapubic discomfort, but however denies having any urinary symptoms of frequency, urgency, or dysuria.  Denies any burning on urination, she denies any urinary incontinent episodes.  Does not wear any pads or depends currently.  She still has nocturia occasionally 1-2 times due to her uncontrolled late night po fluids intake, she denies fever or chills, she does not have nausea, vomiting, or diarrhea.  Her Urine dipstick today is completely negative for any infection, it is negative for gross/microscopic hematuria. Her PVR is 23cc     Again, We discussed the risk factors for recurrent infections being intercourse in younger patients and atrophic changes in older patients.  We discussed the symptoms that are found including pain, pressure, burning, frequency, urgency suprapubic pain and painful intercourse. I discussed upper tract symptoms including fevers, chills, and indicated the workup would be much more aggressive if the patient were to present with recurrent infections in the face of upper tract symptomatology such as fever.                            Again, we discussed detrusor instability which is an  irritative bladder symptomatology most likely related to factors such as intake of bladder irritants, postinfectious irritation, prolapse, with a very large differential diagnosis.  The mainstay of treatment has been tight cholinergics which basically caused the bladder to have  decreased contractility.  We have discussed the side effects of these treatments including dry mouth, double vision, and increasing constipation.  She reports doing well  on oxybutynin for symptomatic relief.                                                  PLAN     Will RESend her urine out for culture, I will call her WITH RESULTS IF ANY positive urine culture.     She has been encouraged to increase her p.o. fluid intake to at least 1 to 2 L daily avoiding any bladder irritants such as caffeine products citrus and spicy foods.     I recommend concomitant probiotics  to protect the rectal reservoir including over-the-counter yogurt preparations to sincere oral pills containing the appropriate probiotics. Patient reports the diligent use of Probiotics.     Discussed things she can do to help her urine frequency such as keeping the bladder diary with strict intake and output of what she eats or drinks, how often she urinates, how much she urinates.  She should follow a bladder training program, and do Keagle exercises to strengthen the muscles to help control urination.     She will follow up with PCP for abdominal /Left flank pain-she is post hernia surgery 2021-she reports a lot of lower quadrant pain at hernia area.      Will see her back in Six weeks For Follow up in clinic and recheck her urinalysis at that time.     May return sooner if need be.     Patient is agreeable to plan of care     Patient reports that she is not currently experiencing any symptoms of urinary incontinence.     Patient's Body mass index is 36.25 kg/m². indicating that she is obese (BMI >30). Obesity-related health conditions include the following: hypertension and GERD. Obesity is improving with lifestyle modifications. BMI is 36.25. We discussed portion control and increasing exercise..  Smoking Cessation Counseling:  Former smoker.  Patient does not currently use any tobacco products.     Follow Up   Return in about 6 weeks (around  10/18/2022) for Next scheduled follow up, RECURRENT UTI/DYSURIA/DETRUSSOR INSTABILITY.    Patient was given instructions and counseling regarding her condition or for health maintenance advice. Please see specific information pulled into the AVS if appropriate.          This document has been electronically signed by Griselda Cheng-Akwa, APRN   September 6, 2022 20:39 EDT      Dictated Utilizing Dragon Dictation: Part of this note may be an electronic transcription/translation of spoken language to printed text using the Dragon Dictation System.

## 2022-09-08 ENCOUNTER — TELEPHONE (OUTPATIENT)
Dept: UROLOGY | Facility: CLINIC | Age: 77
End: 2022-09-08

## 2022-09-08 LAB — BACTERIA SPEC AEROBE CULT: ABNORMAL

## 2022-09-08 NOTE — TELEPHONE ENCOUNTER
Called patient and went over urine culture which showed infection. Patient verbalized understanding. Patient is to continue trimpex per Kaleb.

## 2022-09-08 NOTE — TELEPHONE ENCOUNTER
----- Message from Griselda Cheng-Akwa, APRN sent at 9/8/2022  1:30 PM EDT -----  PLEASE CALL PATIENT WITH URINE CULTURE RESULTS    She is on trimpex already, continue BID AND FOLLOW UP AS DISCUSSED.  Urine Culture   50,000 CFU/mL Escherichia coli Abnormal        Colonization of the urinary tract without infection is common. Treatment is discouraged unless the patient is symptomatic, pregnant, or undergoing an invasive urologic procedure.      Resulting Agency: [facility]      Susceptibility       Escherichia coli    DAVID    Ampicillin Resistant    Ampicillin + Sulbactam Intermediate    Cefazolin Susceptible    Cefepime Susceptible    Ceftazidime Susceptible    Ceftriaxone Susceptible    Gentamicin Susceptible    Levofloxacin Intermediate    Nitrofurantoin Susceptible    Piperacillin + Tazobactam Susceptible    Trimethoprim + Sulfamethoxazole Susceptible               ----- Message -----  From: Paola Mccabe, PCT  Sent: 9/6/2022  10:21 AM EDT  To: Griselda Cheng-Akwa, APRN

## 2022-09-14 ENCOUNTER — HOSPITAL ENCOUNTER (OUTPATIENT)
Dept: GENERAL RADIOLOGY | Facility: HOSPITAL | Age: 77
Discharge: HOME | End: 2022-09-14
Admitting: PHYSICIAN ASSISTANT

## 2022-09-14 ENCOUNTER — OFFICE VISIT (OUTPATIENT)
Dept: PULMONOLOGY | Facility: CLINIC | Age: 77
End: 2022-09-14

## 2022-09-14 VITALS
BODY MASS INDEX: 34.25 KG/M2 | TEMPERATURE: 98.4 F | WEIGHT: 226 LBS | HEART RATE: 66 BPM | OXYGEN SATURATION: 90 % | SYSTOLIC BLOOD PRESSURE: 128 MMHG | HEIGHT: 68 IN | DIASTOLIC BLOOD PRESSURE: 68 MMHG

## 2022-09-14 DIAGNOSIS — J44.9 CHRONIC OBSTRUCTIVE PULMONARY DISEASE, UNSPECIFIED COPD TYPE: Primary | ICD-10-CM

## 2022-09-14 DIAGNOSIS — R91.8 PULMONARY NODULES: ICD-10-CM

## 2022-09-14 DIAGNOSIS — J96.11 CHRONIC RESPIRATORY FAILURE WITH HYPOXIA: ICD-10-CM

## 2022-09-14 DIAGNOSIS — J44.9 CHRONIC OBSTRUCTIVE PULMONARY DISEASE, UNSPECIFIED COPD TYPE: ICD-10-CM

## 2022-09-14 DIAGNOSIS — Z87.891 FORMER SMOKER: ICD-10-CM

## 2022-09-14 PROCEDURE — 71046 X-RAY EXAM CHEST 2 VIEWS: CPT

## 2022-09-14 PROCEDURE — 71046 X-RAY EXAM CHEST 2 VIEWS: CPT | Performed by: RADIOLOGY

## 2022-09-14 PROCEDURE — 99214 OFFICE O/P EST MOD 30 MIN: CPT | Performed by: PHYSICIAN ASSISTANT

## 2022-09-14 NOTE — PROGRESS NOTES
"Chief Complaint  COPD    Subjective        Mayra Hays presents to [facility]  History of Present Illness    Patient presents today for follow-up of COPD, pulmonary nodules, cigarette dependence, chronic hypoxic respiratory failure.  She is tolerating room air today.  She states that she did receive portable oxygen supplies, but has difficulty carrying the smaller tank.  She would appreciate a POC device, which we previously attempted ordered but she had not received.  She continues on breztri for COPD maintenance.  She has been noticing some lower chest wall pleuritic pain, and specifically pointed to the left lateral side as well.  She has noticed some increase shortness of breath as well, and increases with laying flat at nighttime.  Family member present with her today stated that when she developed an effusion in the past, she also had similar symptoms.  She has not had recent imaging completed.      Objective   Vital Signs:  /68 (BP Location: Left arm, Patient Position: Sitting)   Pulse 66   Temp 98.4 °F (36.9 °C)   Ht 172.7 cm (68\")   Wt 103 kg (226 lb)   SpO2 90%   BMI 34.36 kg/m²   Estimated body mass index is 34.36 kg/m² as calculated from the following:    Height as of this encounter: 172.7 cm (68\").    Weight as of this encounter: 103 kg (226 lb).        Physical Exam  Vitals reviewed.   Constitutional:       General: She is not in acute distress.     Appearance: She is well-developed. She is not diaphoretic.   HENT:      Head: Normocephalic and atraumatic.   Cardiovascular:      Rate and Rhythm: Normal rate and regular rhythm.      Heart sounds: Normal heart sounds, S1 normal and S2 normal.   Pulmonary:      Effort: Pulmonary effort is normal.      Breath sounds: Rales (Right lower chest wall crackles) present. No wheezing or rhonchi.   Neurological:      Mental Status: She is alert and oriented to person, place, and time.   Psychiatric: "         Behavior: Behavior normal.        Result Review :  The following data was reviewed by: Sabiha Valadez PA-C on 09/14/2022:    Reviewed the CT chest from November 2021.  Reviewed PFT from 2018.        Assessment and Plan   Diagnoses and all orders for this visit:    1. Chronic obstructive pulmonary disease, unspecified COPD type (HCC) (Primary)  -     XR Chest 2 View; Future    2. Chronic respiratory failure with hypoxia (HCC)  -     Miscellaneous DME    3. Pulmonary nodules    4. Former smoker        COPD, ? Developing pneumonia:   · Continue albuterol inhaler needed  · Continue DuoNebs for as needed use  · Continue breztri as scheduled  · Ordered chest x-ray for acute assessment.  We will consider CT at a later time (RLL crackles, some lower back, left lower/lateral intermittent discomfort; history of previous effusion)       Chronic hypoxic respiratory failure:   · Compliant with 2 L/m at nighttime, and as needed use.   POC device previously ordered for use on 2 L/m, but had not received.  We will reorder  tolerating room air today with saturation in the low 90s.  · Unable to tolerate previous trial of PAP therapy previously with full facemask.   However, she is willing to try the SHARI device with the nasal only mask and would likely benefit from pressurized air flow due to severe underlying COPD.   DME order sent to [facility]      Patient is notable for COPD and chronic hypoxic respiratory failure not improved/not tolerated with CPAP.  NIV is required to provide a target tidal volume with the ability to change pressures quickly in order to maintain the patient's target tidal volume.  This would allow for appropriate removal of carbon dioxide.  This cannot be provided by less costly options including BiPAP ASV or ST.  NIV with AVAPS/AE would also provide an auto backup rate, which may help to prevent air trapping, breath stacking which can further negatively impact the patient's  already impaired lung function.   Due to the patient's worsening condition and individual case, noninvasive ventilator therapy is recommended to provide the most benefit.  This may also further reduce exacerbations of acute on chronic hypoxic and hypercarbic respiratory failure, which can result in  outpatient visits, ER visits, and hospitalizations.  SHARI device specifically requested due to the nasal only mask availability, which would be the only option tolerated by the patient.  NIV is necessary due to the patient's underlying respiratory failure/COPD.  Patient has progressively deteriorated in status, failure to obtain would be harmful to the patient.          Calcified pulmonary nodules, cigarette dependence:   Cessation achieved in 2015.  Notable for 40+ year use with at least 1 pack/day average.  Recent CT chest showed stable fibronodular scarring at the lung apices and an area of increased density in the left lung base against the pleura, unchanged from 2019.  Calcified granulomas also appear stable.  · We will plan for repeat low-dose CT chest in 1 year, will order closer to November 2022, likely order at the next visit as we are ordering a chest x-ray today.        Follow Up   Return in about 4 months (around 1/14/2023), or if symptoms worsen or fail to improve, for Next scheduled follow up.  Patient was given instructions and counseling regarding her condition or for health maintenance advice. Please see specific information pulled into the AVS if appropriate.       Addendum: 9/15/22  Reviewed the xray imaging/results.   Updated Mrs. Hays (directly) and daughter/POA (voicemail).   No urgent findings.   Will go ahead and treat with antibiotic and oral steroid.   Continue other current medications (which should still continue to help in the case of any true pulmonary edema, mild appearance versus developing pneumonia at this time).

## 2022-09-15 RX ORDER — PREDNISONE 20 MG/1
40 TABLET ORAL DAILY
Qty: 10 TABLET | Refills: 0 | Status: SHIPPED | OUTPATIENT
Start: 2022-09-15 | End: 2022-09-20

## 2022-09-15 RX ORDER — DOXYCYCLINE HYCLATE 100 MG/1
100 CAPSULE ORAL 2 TIMES DAILY
Qty: 10 CAPSULE | Refills: 0 | Status: SHIPPED | OUTPATIENT
Start: 2022-09-15 | End: 2022-09-20

## 2022-09-27 ENCOUNTER — DOCUMENTATION (OUTPATIENT)
Dept: PULMONOLOGY | Facility: CLINIC | Age: 77
End: 2022-09-27

## 2022-09-27 NOTE — PROGRESS NOTES
DME company stated that patient will not be eligible for a POC until her 5 year renewal (May 2023).

## 2022-10-03 ENCOUNTER — HOSPITAL ENCOUNTER (EMERGENCY)
Facility: HOSPITAL | Age: 77
Discharge: HOME | End: 2022-10-03
Attending: STUDENT IN AN ORGANIZED HEALTH CARE EDUCATION/TRAINING PROGRAM | Admitting: STUDENT IN AN ORGANIZED HEALTH CARE EDUCATION/TRAINING PROGRAM

## 2022-10-03 VITALS
OXYGEN SATURATION: 97 % | HEART RATE: 75 BPM | TEMPERATURE: 97.7 F | WEIGHT: 250 LBS | BODY MASS INDEX: 37.89 KG/M2 | HEIGHT: 68 IN | RESPIRATION RATE: 20 BRPM | DIASTOLIC BLOOD PRESSURE: 84 MMHG | SYSTOLIC BLOOD PRESSURE: 142 MMHG

## 2022-10-03 DIAGNOSIS — L02.91 ABSCESS: Primary | ICD-10-CM

## 2022-10-03 PROCEDURE — 99283 EMERGENCY DEPT VISIT LOW MDM: CPT

## 2022-10-03 PROCEDURE — 25010000002 DEXAMETHASONE PER 1 MG: Performed by: STUDENT IN AN ORGANIZED HEALTH CARE EDUCATION/TRAINING PROGRAM

## 2022-10-03 PROCEDURE — 96372 THER/PROPH/DIAG INJ SC/IM: CPT

## 2022-10-03 RX ORDER — CLINDAMYCIN HYDROCHLORIDE 300 MG/1
300 CAPSULE ORAL 3 TIMES DAILY
Qty: 21 CAPSULE | Refills: 0 | Status: SHIPPED | OUTPATIENT
Start: 2022-10-03 | End: 2023-03-01

## 2022-10-03 RX ORDER — CLINDAMYCIN HYDROCHLORIDE 150 MG/1
300 CAPSULE ORAL ONCE
Status: COMPLETED | OUTPATIENT
Start: 2022-10-03 | End: 2022-10-03

## 2022-10-03 RX ORDER — DEXAMETHASONE SODIUM PHOSPHATE 4 MG/ML
4 INJECTION, SOLUTION INTRA-ARTICULAR; INTRALESIONAL; INTRAMUSCULAR; INTRAVENOUS; SOFT TISSUE ONCE
Status: COMPLETED | OUTPATIENT
Start: 2022-10-03 | End: 2022-10-03

## 2022-10-03 RX ORDER — LIDOCAINE HYDROCHLORIDE 10 MG/ML
5 INJECTION, SOLUTION EPIDURAL; INFILTRATION; INTRACAUDAL; PERINEURAL ONCE
Status: COMPLETED | OUTPATIENT
Start: 2022-10-03 | End: 2022-10-03

## 2022-10-03 RX ADMIN — DEXAMETHASONE SODIUM PHOSPHATE 4 MG: 4 INJECTION, SOLUTION INTRA-ARTICULAR; INTRALESIONAL; INTRAMUSCULAR; INTRAVENOUS; SOFT TISSUE at 20:28

## 2022-10-03 RX ADMIN — CLINDAMYCIN HYDROCHLORIDE 300 MG: 150 CAPSULE ORAL at 20:27

## 2022-10-03 RX ADMIN — LIDOCAINE HYDROCHLORIDE 5 ML: 10 INJECTION, SOLUTION EPIDURAL; INFILTRATION; INTRACAUDAL; PERINEURAL at 20:27

## 2022-10-04 NOTE — ED PROVIDER NOTES
Subjective   History of Present Illness  77-year-old female presents to the ER due to concerns for an abscess developing in between her breasts at the area of the distal sternum.  Patient notes that is been present for the last 2 to 3 days.  Worsening swelling and tenderness as well.  Patient noted no obvious exudative drainage.  No fever.  Mild surrounding erythema.  Tenderness into the medial aspect of both breasts at the area near the abscess.  Denied chest pain or shortness of breath.  Denied headache or vision changes.  Vitals stable        Review of Systems   Skin:        Abscess   All other systems reviewed and are negative.      Past Medical History:   Diagnosis Date   • CHF (congestive heart failure) (HCC)    • Collapsed lung    • COPD (chronic obstructive pulmonary disease) (HCC)    • Coronary artery disease    • Diabetes mellitus (HCC)    • Elevated cholesterol    • GERD (gastroesophageal reflux disease)    • Hyperlipidemia    • Hypertension    • Myocardial infarction (HCC)    • Stroke (HCC)        No Known Allergies    Past Surgical History:   Procedure Laterality Date   • ABDOMINAL SURGERY     • CARDIAC CATHETERIZATION N/A 8/22/2017    Procedure: Left Heart Cath;  Surgeon: Jatinder Matias MD;  Location: [facility] CATH INVASIVE LOCATION;  Service:    • CARDIAC CATHETERIZATION N/A 11/22/2021    Procedure: Left Heart Cath;  Surgeon: Tolu Steinberg MD;  Location: [facility] INVASIVE LOCATION;  Service: Cardiology;  Laterality: N/A;   • COLONOSCOPY     • ENDOSCOPY     • GALLBLADDER SURGERY     • VENTRAL HERNIA REPAIR N/A 5/14/2020    Procedure: VENTRAL HERNIA REPAIR LAPAROSCOPIC WITH DAVINCI ROBOT;  Surgeon: Petr Velásquez MD;  Location: [facility];  Service: DaVinci;  Laterality: N/A;       Family History   Problem Relation Age of Onset   • Heart disease Mother    • Heart disease Father        Social History     Socioeconomic History   • Marital status: Single   Tobacco Use   • Smoking status: Former  Smoker     Packs/day: 3.00     Years: 55.00     Pack years: 165.00     Types: Cigarettes     Quit date:      Years since quittin.7   • Smokeless tobacco: Never Used   Vaping Use   • Vaping Use: Never used   Substance and Sexual Activity   • Alcohol use: No   • Drug use: No   • Sexual activity: Defer           Objective   Physical Exam  Constitutional:       General: She is not in acute distress.     Appearance: Normal appearance. She is not ill-appearing.   HENT:      Head: Normocephalic and atraumatic.      Right Ear: External ear normal.      Left Ear: External ear normal.      Nose: Nose normal.      Mouth/Throat:      Mouth: Mucous membranes are moist.   Eyes:      Extraocular Movements: Extraocular movements intact.      Pupils: Pupils are equal, round, and reactive to light.   Cardiovascular:      Rate and Rhythm: Normal rate and regular rhythm.      Heart sounds: No murmur heard.  Pulmonary:      Effort: Pulmonary effort is normal. No respiratory distress.      Breath sounds: Normal breath sounds. No wheezing.   Abdominal:      General: Bowel sounds are normal.      Palpations: Abdomen is soft.      Tenderness: There is no abdominal tenderness.   Musculoskeletal:         General: No deformity or signs of injury. Normal range of motion.      Cervical back: Normal range of motion and neck supple.   Skin:     General: Skin is warm and dry.      Findings: No erythema.             Comments: 3-4 cm abscess developing in between both breasts at the level of the xiphoid.  Moderate surrounding erythema.  No obvious exudative drainage.  Significant tenderness to palpation.  Induration noted as well.  Small pocket of fluid noted.   Neurological:      General: No focal deficit present.      Mental Status: She is alert and oriented to person, place, and time. Mental status is at baseline.      Cranial Nerves: No cranial nerve deficit.   Psychiatric:         Mood and Affect: Mood normal.         Behavior: Behavior  normal.         Thought Content: Thought content normal.         Incision & Drainage    Date/Time: 10/3/2022 9:52 PM  Performed by: Nima Cooley DO  Authorized by: Nima Cooley DO     Consent:     Consent obtained:  Verbal    Consent given by:  Patient    Risks, benefits, and alternatives were discussed: yes      Risks discussed:  Bleeding, pain, damage to other organs and infection    Alternatives discussed:  No treatment, delayed treatment, alternative treatment, observation and referral  Universal protocol:     Patient identity confirmed:  Verbally with patient, arm band and hospital-assigned identification number  Location:     Type:  Abscess    Size:  4    Location:  Trunk    Trunk location:  Chest  Pre-procedure details:     Skin preparation:  Povidone-iodine  Sedation:     Sedation type:  None  Anesthesia:     Anesthesia method:  Local infiltration    Local anesthetic:  Lidocaine 1% w/o epi  Procedure type:     Complexity:  Simple  Procedure details:     Incision types:  Stab incision    Incision depth:  Subcutaneous    Wound management:  Probed and deloculated    Drainage:  Purulent and bloody    Drainage amount:  Copious    Wound treatment:  Wound left open    Packing materials:  None  Post-procedure details:     Procedure completion:  Tolerated well, no immediate complications               ED Course  ED Course as of 10/03/22 2154   Mon Oct 03, 2022   2153 Successful I&D.  Moderate to copious amount of exudative drainage was relieved from this area of concern.  Improved erythema noted.  Patient received Decadron.  Noted improvement.  Patient will start clindamycin.  Work up and results were discussed throughly with the patient.  The patient will be discharged for further monitoring and management with their PCP.  Red flags, warning signs, worsening symptoms, and when to return to the ER discussed with and understood by the patient.  Patient will follow up with their PCP in a timely manner.  Vitals  stable at discharge. [SF]      ED Course User Index  [SF] Nima Cooley DO                                           MDM    Final diagnoses:   Abscess       ED Disposition  ED Disposition     ED Disposition   Discharge    Condition   Stable    Comment   --             Niki Antony, APRN  [address]  [facility] [address]  [phone]    In 1 week      [facility]  [address] [place] [id number] [phone]    If symptoms worsen         Medication List      New Prescriptions    clindamycin 300 MG capsule  Commonly known as: CLEOCIN  Take 1 capsule by mouth 3 (Three) Times a Day.           Where to Get Your Medications      These medications were sent to [place], [place] - [address] - [phone]  - [phone]   [address] [facility] [address]    Phone: [phone]   · clindamycin 300 MG capsule          Nima Cooley DO  10/03/22 7844

## 2022-10-28 ENCOUNTER — OFFICE VISIT (OUTPATIENT)
Dept: CARDIOLOGY | Facility: CLINIC | Age: 77
End: 2022-10-28

## 2022-10-28 VITALS
OXYGEN SATURATION: 97 % | SYSTOLIC BLOOD PRESSURE: 122 MMHG | HEART RATE: 63 BPM | WEIGHT: 225 LBS | HEIGHT: 68 IN | DIASTOLIC BLOOD PRESSURE: 65 MMHG | BODY MASS INDEX: 34.1 KG/M2

## 2022-10-28 DIAGNOSIS — I50.32 CHRONIC HEART FAILURE WITH PRESERVED EJECTION FRACTION: Primary | ICD-10-CM

## 2022-10-28 DIAGNOSIS — I10 ESSENTIAL HYPERTENSION: ICD-10-CM

## 2022-10-28 PROCEDURE — 99213 OFFICE O/P EST LOW 20 MIN: CPT | Performed by: PHYSICIAN ASSISTANT

## 2022-10-28 NOTE — PROGRESS NOTES
Niki Antony APRN  Mayra Hays  1945  10/28/2022    Patient Active Problem List   Diagnosis   • Chronic heart failure with preserved ejection fraction (HCC)   • Essential hypertension   • Type 2 diabetes mellitus (HCC)   • Abnormal nuclear stress test with moderate to large size anteroapical and lateral wall myocardial ischemia.   • Acute renal insufficiency   • Paroxysmal atrial fibrillation (HCC)   • Nocturnal hypoxia   • Ventral hernia without obstruction or gangrene   • Chronic obstructive pulmonary disease (HCC)   • Urinary tract infection without hematuria   • Former smoker   • Gait instability   • Pulmonary nodule   • Chronic respiratory failure with hypoxia (HCC)       Dear Niki Antony APRN:    Subjective     History of Present Illness:    Chief Complaint   Patient presents with   • Follow-up       Mayra Hays is a pleasant 77 y.o. female with a past medical history significant for no known CAD with recent LHC showing normal coronary arteries.  She does have diabetes mellitus, she does not currently smoke tobacco anymore but does have 112-year pack year history where she averaged 2 to 3 packs a day for 56 years.  She also has paroxysmal atrial fibrillation anticoagulated with Eliquis, severe COPD and follows with pulmonology, essential hypertension, and dyslipidemia.  She comes in today for routine cardiology follow-up.    Lucy does still report some chest pains that she describes as a rope tied around her abdomen.  This has been chronic ongoing for well over a year.  She did have left heart catheterization that did show normal coronary arteries.  Otherwise denies any issues with atrial fibrillation such as tachycardia and denies any bleeding issues with her Eliquis.  She also denies any shortness of breath, syncope, or near syncope.  Blood pressure has been well controlled.      No Known Allergies:      Current Outpatient Medications:   •  albuterol sulfate  (90 Base) MCG/ACT  inhaler, Inhale 2 puffs Every 4 (Four) Hours As Needed for Wheezing or Shortness of Air., Disp: 18 g, Rfl: 8  •  apixaban (ELIQUIS) 5 MG tablet tablet, Take 1 tablet by mouth Every 12 (Twelve) Hours., Disp: 180 tablet, Rfl: 3  •  atorvastatin (LIPITOR) 20 MG tablet, Take 1 tablet by mouth Daily., Disp: 90 tablet, Rfl: 3  •  Budeson-Glycopyrrol-Formoterol (Breztri Aerosphere) 160-9-4.8 MCG/ACT aerosol inhaler, Inhale 2 puffs 2 (Two) Times a Day., Disp: 10.7 g, Rfl: 8  •  clindamycin (CLEOCIN) 300 MG capsule, Take 1 capsule by mouth 3 (Three) Times a Day., Disp: 21 capsule, Rfl: 0  •  esomeprazole (nexIUM) 20 MG capsule, Take 1 capsule by mouth Daily., Disp: , Rfl:   •  ipratropium-albuterol (DUO-NEB) 0.5-2.5 mg/3 ml nebulizer, Take 3 mL by nebulization 4 (Four) Times a Day As Needed for Wheezing or Shortness of Air., Disp: 360 mL, Rfl: 8  •  ketorolac (TORADOL) 10 MG tablet, Take 1 tablet by mouth Every 6 (Six) Hours As Needed for Moderate Pain ., Disp: 20 tablet, Rfl: 0  •  lisinopril (PRINIVIL,ZESTRIL) 5 MG tablet, Take 1 tablet by mouth Daily., Disp: 90 tablet, Rfl: 3  •  loratadine (CLARITIN) 10 MG tablet, Take 10 mg by mouth Daily As Needed for Allergies., Disp: , Rfl:   •  metFORMIN (GLUCOPHAGE) 500 MG tablet, Take 1 tablet by mouth 2 (two) times a day., Disp: , Rfl:   •  O2 (OXYGEN), Inhale 2 L/min As Needed., Disp: , Rfl:   •  pantoprazole (PROTONIX) 40 MG EC tablet, Take 40 mg by mouth Daily As Needed (acid reflux)., Disp: , Rfl:   •  phenazopyridine (Pyridium) 200 MG tablet, Take 1 tablet by mouth 3 (Three) Times a Day As Needed for Bladder Spasms (stop after 3 days/use prn only)., Disp: 20 tablet, Rfl: 0  •  potassium chloride (K-DUR,KLOR-CON) 20 MEQ CR tablet, Take 20 mEq by mouth Daily., Disp: , Rfl:   •  spironolactone (ALDACTONE) 25 MG tablet, Take 1 tablet by mouth Daily., Disp: 90 tablet, Rfl: 3  •  Stool Softener 100 MG capsule, Take 1 capsule by mouth 2 (two) times a day., Disp: , Rfl:   •   "topiramate (TOPAMAX) 25 MG tablet, Take 25 mg by mouth 2 (Two) Times a Day., Disp: , Rfl:   •  trimethoprim (TRIMPEX) 100 MG tablet, Take 1 tablet by mouth 2 (Two) Times a Day., Disp: 30 tablet, Rfl: 1    The following portions of the patient's history were reviewed and updated as appropriate: allergies, current medications, past family history, past medical history, past social history, past surgical history and problem list.    Social History     Tobacco Use   • Smoking status: Former     Packs/day: 3.00     Years: 55.00     Pack years: 165.00     Types: Cigarettes     Quit date:      Years since quittin.8   • Smokeless tobacco: Never   Vaping Use   • Vaping Use: Never used   Substance Use Topics   • Alcohol use: No   • Drug use: No         Objective   Vitals:    10/28/22 1026   BP: 122/65   Pulse: 63   SpO2: 97%   Weight: 102 kg (225 lb)   Height: 172.7 cm (67.99\")     Body mass index is 34.22 kg/m².    Constitutional:       General: Not in acute distress.     Appearance: Healthy appearance. Well-developed and not in distress. Not diaphoretic.   Eyes:      Conjunctiva/sclera: Conjunctivae normal.      Pupils: Pupils are equal, round, and reactive to light.   HENT:      Head: Normocephalic and atraumatic.   Neck:      Vascular: No carotid bruit or JVD.   Pulmonary:      Effort: Pulmonary effort is normal. No respiratory distress.      Breath sounds: Normal breath sounds.   Cardiovascular:      Normal rate. Regular rhythm.   Skin:     General: Skin is cool.   Neurological:      Mental Status: Alert, oriented to person, place, and time and oriented to person, place and time.         Lab Results   Component Value Date     2022    K 4.4 2022    CL 99 2022    CO2 27.3 2022    BUN 18 2022    CREATININE 0.96 2022    GLUCOSE 87 2022    CALCIUM 9.6 2022    AST 16 2022    ALT 13 2022    ALKPHOS 119 (H) 2022     Lab Results   Component Value Date "    CKTOTAL 14 (L) 06/25/2018     Lab Results   Component Value Date    WBC 6.70 11/22/2021    HGB 12.5 11/22/2021    HCT 41.1 11/22/2021     11/22/2021     Lab Results   Component Value Date    INR 1.00 05/15/2020    INR 0.99 05/14/2020    INR 1.02 06/01/2017     Lab Results   Component Value Date    MG 2.3 05/18/2020     Lab Results   Component Value Date    TSH 2.670 12/18/2019    TRIG 106 07/28/2022    HDL 52 07/28/2022    LDL 96 07/28/2022      Lab Results   Component Value Date    BNP 29.0 02/01/2019       During this visit the following were done:  Labs Reviewed []    Labs Ordered []    Radiology Reports Reviewed []    Radiology Ordered []    PCP Records Reviewed []    Referring Provider Records Reviewed []    ER Records Reviewed []    Hospital Records Reviewed []    History Obtained From Family []    Radiology Images Reviewed []    Other Reviewed []    Records Requested []       Procedures    Assessment & Plan    Diagnosis Plan   1. Chronic heart failure with preserved ejection fraction (HCC)        2. Essential hypertension                 Recommendations:  1. Precordial pain  a. Once again asked her to pursue noncardiac work-up patient has normal coronary arteries and echo was also unremarkable.  She reports she is scheduled to see GI.  2. Paroxysmal atrial fibrillation  a. Doing well clinically tolerating Eliquis without any bleeding issues.  We will continue current therapy.  Has not been on beta-blocker due to baseline physiological bradycardia.    Return in about 6 months (around 4/28/2023).    As always, I appreciate very much the opportunity to participate in the cardiovascular care of your patients.      With Best Regards,    Darek Nelson PA-C

## 2023-01-06 ENCOUNTER — OFFICE VISIT (OUTPATIENT)
Dept: UROLOGY | Facility: CLINIC | Age: 78
End: 2023-01-06
Payer: MEDICARE

## 2023-01-06 VITALS — HEIGHT: 68 IN | BODY MASS INDEX: 34.25 KG/M2 | WEIGHT: 226 LBS

## 2023-01-06 DIAGNOSIS — R10.9 ABDOMINAL PAIN AFFECTING PREGNANCY: ICD-10-CM

## 2023-01-06 DIAGNOSIS — R10.32 LEFT LOWER QUADRANT ABDOMINAL PAIN: ICD-10-CM

## 2023-01-06 DIAGNOSIS — N39.0 URINARY TRACT INFECTION WITHOUT HEMATURIA, SITE UNSPECIFIED: ICD-10-CM

## 2023-01-06 DIAGNOSIS — N32.81 DETRUSOR INSTABILITY OF BLADDER: ICD-10-CM

## 2023-01-06 DIAGNOSIS — R10.9 ACUTE LEFT FLANK PAIN: ICD-10-CM

## 2023-01-06 DIAGNOSIS — R31.0 GROSS HEMATURIA: ICD-10-CM

## 2023-01-06 DIAGNOSIS — R35.0 FREQUENCY OF MICTURITION: Primary | ICD-10-CM

## 2023-01-06 DIAGNOSIS — G89.29 CHRONIC LEFT-SIDED LOW BACK PAIN WITHOUT SCIATICA: ICD-10-CM

## 2023-01-06 DIAGNOSIS — M54.50 CHRONIC LEFT-SIDED LOW BACK PAIN WITHOUT SCIATICA: ICD-10-CM

## 2023-01-06 DIAGNOSIS — O26.899 ABDOMINAL PAIN AFFECTING PREGNANCY: ICD-10-CM

## 2023-01-06 PROCEDURE — 51798 US URINE CAPACITY MEASURE: CPT | Performed by: NURSE PRACTITIONER

## 2023-01-06 PROCEDURE — 87086 URINE CULTURE/COLONY COUNT: CPT | Performed by: NURSE PRACTITIONER

## 2023-01-06 PROCEDURE — 81003 URINALYSIS AUTO W/O SCOPE: CPT | Performed by: NURSE PRACTITIONER

## 2023-01-06 PROCEDURE — 96372 THER/PROPH/DIAG INJ SC/IM: CPT | Performed by: NURSE PRACTITIONER

## 2023-01-06 PROCEDURE — 99214 OFFICE O/P EST MOD 30 MIN: CPT | Performed by: NURSE PRACTITIONER

## 2023-01-06 RX ORDER — LIDOCAINE 50 MG/G
1 PATCH TOPICAL EVERY 24 HOURS
Qty: 30 PATCH | Refills: 0 | Status: SHIPPED | OUTPATIENT
Start: 2023-01-06 | End: 2023-02-05

## 2023-01-06 RX ORDER — METHOCARBAMOL 500 MG/1
500 TABLET, FILM COATED ORAL 3 TIMES DAILY PRN
Qty: 30 TABLET | Refills: 2 | Status: SHIPPED | OUTPATIENT
Start: 2023-01-06

## 2023-01-06 RX ORDER — KETOROLAC TROMETHAMINE 10 MG/1
10 TABLET, FILM COATED ORAL EVERY 6 HOURS PRN
Qty: 20 TABLET | Refills: 0 | Status: SHIPPED | OUTPATIENT
Start: 2023-01-06

## 2023-01-06 RX ORDER — KETOROLAC TROMETHAMINE 30 MG/ML
60 INJECTION, SOLUTION INTRAMUSCULAR; INTRAVENOUS ONCE
Status: COMPLETED | OUTPATIENT
Start: 2023-01-06 | End: 2023-01-06

## 2023-01-06 RX ADMIN — KETOROLAC TROMETHAMINE 60 MG: 30 INJECTION, SOLUTION INTRAMUSCULAR; INTRAVENOUS at 11:06

## 2023-01-06 NOTE — PROGRESS NOTES
Chief Complaint  Cystitis and Urinary Frequency (3 MONTH Follow up FOR UTI/FLAN/ABD PAIN/OAB)    Ravi Hays presents to [facility] for ACUTE CYSTITIS/DYSURIA/OAB  History of Present Illness    Ms. Mayra Hays is a very pleasant 77-year-old female who returns to clinic today for evaluation. This is a 3-month follow-up for E. coli recurrent UTIs, acute cystitis with hematuria, and detrusor instability. When evaluated in clinic 3 months ago, the patient reported doing better on antibiotic prophylaxis with Macrobid. We had discussed stopping medication if her last urine culture was negative. She returns to clinic today for reevaluation. On initial evaluation, her urine dipstick is completely negative for any infection. It is negative for gross/microscopic hematuria. Her PVR is 96 mL. The patient reports dark urine at times. She finished Bactrim in 10/2022 and has not been on any more medications.    The patient is accompanied by an adult male who states that the patient has been doing well. He reports that the patient has been experiencing pain in her lower back and left flank area mainly during the night. She states that the pain is the same as the pain she had months ago, WHEN SHE HAD UTIs.     She reports that she had a hernia repaired IN THE PAST and she is experiencing pain at the hernia site. She has been straining to have a bowel movement only 1 to 2 times. She denies any burning with urination. She reports that she takes a water pill during the day and drinks a lot of water and drinks water at night as well. She states that she wakes up 4 times a night to urinate and does not sleep much. She states it is due to pain and is unable to go back to sleep. The adult male states the patient stated her urine was cloudy. She drinks 2 to 3 16-ounce bottles of water and 1 can of soda a day. She states that she has noticed blood in her urine. She is not  taking any medications for pain; however, she has been using lidocaine patches for pain. She states that her pain is a 10 out of 10 today and feels like a knife is cutting her. She states her legs do hurt sometimes and has never heard about sciatic pain. She reports that she has numbness in her hands and fingers occasionally.     The patient has a history of cholecystectomy.    The patient is diabetic.      Objective   Vital Signs:   Ht 172.7 cm (67.99\")   Wt 103 kg (226 lb)   BMI 34.37 kg/m²       ROS:   Review of Systems   Constitutional: Negative for activity change, appetite change, diaphoresis, fatigue, fever, unexpected weight gain and unexpected weight loss.   HENT: Negative for congestion, ear discharge, ear pain, nosebleeds, rhinorrhea, sinus pressure and sore throat.    Eyes: Negative for blurred vision, double vision, photophobia, pain, redness and visual disturbance.   Respiratory: Negative for apnea, cough, chest tightness, shortness of breath, wheezing and stridor.    Cardiovascular: Negative for chest pain and palpitations.   Gastrointestinal: Positive for abdominal pain. Negative for abdominal distention, constipation, diarrhea, nausea and vomiting.   Endocrine: Negative for polydipsia, polyphagia and polyuria.   Genitourinary: Positive for dysuria and flank pain. Negative for decreased urine volume, difficulty urinating, frequency, hematuria, urgency and urinary incontinence.   Musculoskeletal: Positive for back pain. Negative for arthralgias and joint swelling.   Skin: Negative for pallor, rash and wound.   Neurological: Negative for dizziness, tremors, syncope, weakness, light-headedness, memory problem and confusion.   Psychiatric/Behavioral: Negative for behavioral problems.        Physical Exam  Constitutional:       General: She is in acute distress.      Appearance: She is well-developed. She is obese. She is ill-appearing.   HENT:      Head: Normocephalic and atraumatic.   Eyes:       Pupils: Pupils are equal, round, and reactive to light.   Neck:      Thyroid: No thyromegaly.      Trachea: No tracheal deviation.   Cardiovascular:      Rate and Rhythm: Normal rate and regular rhythm.      Heart sounds: No murmur heard.  Pulmonary:      Effort: Pulmonary effort is normal. No respiratory distress.      Breath sounds: Normal breath sounds. No stridor. No wheezing.   Abdominal:      General: Bowel sounds are normal. There is distension.      Palpations: Abdomen is soft.      Tenderness: There is abdominal tenderness. There is guarding.   Genitourinary:     Labia:         Right: No tenderness.         Left: No tenderness.       Vagina: Normal. No vaginal discharge.      Comments: URINE FREQUENCY, DYSURIA, FOUL SMELLING URINE  Musculoskeletal:         General: Tenderness present. No deformity. Normal range of motion.      Cervical back: Normal range of motion.   Skin:     General: Skin is warm and dry.      Capillary Refill: Capillary refill takes less than 2 seconds.      Coloration: Skin is pale.      Findings: No erythema or rash.   Neurological:      Mental Status: She is alert and oriented to person, place, and time.      Cranial Nerves: No cranial nerve deficit.      Sensory: No sensory deficit.      Motor: Weakness present.      Coordination: Coordination abnormal.   Psychiatric:         Behavior: Behavior normal.         Thought Content: Thought content normal.         Judgment: Judgment normal.        Result Review :     UA    Urinalysis 2/15/22 9/6/22 1/6/23   Ketones, UA Negative Negative Negative   Leukocytes, UA Negative Negative Negative           Urine Culture    Urine Culture 2/15/22 9/6/22 1/6/23   Urine Culture >100,000 CFU/mL Mixed Yareli Isolated 50,000 CFU/mL Escherichia coli (A) No growth   (A) Abnormal value                 Assessment and Plan    Problem List Items Addressed This Visit        Genitourinary and Reproductive     Urinary tract infection without hematuria   Other  Visit Diagnoses     Frequency of micturition    -  Primary    Relevant Orders    POC Urinalysis Dipstick, Automated (Completed)    Urine Culture - Urine, Urine, Random Void (Completed)    Detrusor instability of bladder        Relevant Medications    ketorolac (TORADOL) 10 MG tablet    Chronic left-sided low back pain without sciatica        Relevant Medications    lidocaine (Lidoderm) 5 %    ketorolac (TORADOL) 10 MG tablet    methocarbamol (Robaxin) 500 MG tablet    ketorolac (TORADOL) injection 60 mg (Completed)    Other Relevant Orders    CT Abdomen Pelvis With & Without Contrast    Abdominal pain affecting pregnancy        Relevant Medications    lidocaine (Lidoderm) 5 %    ketorolac (TORADOL) 10 MG tablet    methocarbamol (Robaxin) 500 MG tablet    Other Relevant Orders    CT Abdomen Pelvis With & Without Contrast    Gross hematuria        Relevant Orders    CT Abdomen Pelvis With & Without Contrast    Left lower quadrant abdominal pain        Relevant Medications    lidocaine (Lidoderm) 5 %    ketorolac (TORADOL) 10 MG tablet    methocarbamol (Robaxin) 500 MG tablet    Other Relevant Orders    CT Abdomen Pelvis With & Without Contrast    Acute left flank pain        Relevant Medications    lidocaine (Lidoderm) 5 %    ketorolac (TORADOL) 10 MG tablet    methocarbamol (Robaxin) 500 MG tablet    Other Relevant Orders    CT Abdomen Pelvis With & Without Contrast                                                        ASSESSMENT         ACUTE CYSTITIS/RECURRENT UTI/ALANK PAIN/ABDOMINAL PAIN  Ms. Mayra ARCHER is a very pleasant 77 year-old female who returns to clinic today for evaluation.  This is HER 3 month follow-up for E. coli recurrent UTI, acute cystitis with hematuria, and overactive bladder.  Patient was on suppressive therapy with Macrobid daily post anticholinergic Myrbetriq but stopped.  Today, She reports  having many more episodes of right lower quadrant abdominal pain, and lower back pain.  She  reports pelvic pressure and suprapubic discomfort, but however denies having any urinary symptoms of frequency, urgency, or dysuria.  Denies any burning on urination, she denies any urinary incontinent episodes.  Does not wear any pads or depends currently.  She still has nocturia occasionally 1-2 times due to her uncontrolled late night po fluids intake, she denies fever or chills, she does not have nausea, vomiting, or diarrhea.  Her Urine dipstick today is completely negative for any infection, it is negative for gross/microscopic hematuria. Her PVR is 96cc     Again, We discussed the risk factors for recurrent infections being intercourse in younger patients and atrophic changes in older patients.  We discussed the symptoms that are found including pain, pressure, burning, frequency, urgency suprapubic pain and painful intercourse. I discussed upper tract symptoms including fevers, chills, and indicated the workup would be much more aggressive if the patient were to present with recurrent infections in the face of upper tract symptomatology such as fever.                            Again, we discussed detrusor instability which is an  irritative bladder symptomatology most likely related to factors such as intake of bladder irritants, postinfectious irritation, prolapse, with a very large differential diagnosis.  The mainstay of treatment has been tight cholinergics which basically caused the bladder to have decreased contractility.  We have discussed the side effects of these treatments including dry mouth, double vision, and increasing constipation.  She reports doing well  on oxybutynin for symptomatic relief.                                                  PLAN     Will RESend her urine out for culture, I will call her WITH RESULTS IF ANY positive urine culture.     She has been encouraged to increase her p.o. fluid intake to at least 1 to 2 L daily avoiding any bladder irritants such as caffeine products  citrus and spicy foods.      I recommend concomitant probiotics  to protect the rectal reservoir including over-the-counter yogurt preparations to sincere oral pills containing the appropriate probiotics. Patient reports the diligent use of Probiotics.     Discussed things she can do to help her urine frequency such as keeping the bladder diary with strict intake and output of what she eats or drinks, how often she urinates, how much she urinates.  She should follow a bladder training program, and do Keagle exercises to strengthen the muscles to help control urination.    TORADOL 650 MG IM X 1 DOSE GIVEN IN CLINIC FOR FLAN/BACK PAIN     We discussed OTHER treatment  options for her flank pain with patient encouraged to continue conservative therapy alternating NSAID/Tylenol as tolerated, Also including hot baths, showers, warm compresses to lower back as tolerated. Also encouraged walking, physical therapy, light exercises as tolerated    Rest is the most important treatment in the case of flank pain. Rest and physical therapy are enough to improve minor pain. Discussed to monitor her daily routine with prevention of flank pain by: At least Drinking 8 glasses of water per day, Limiting your alcohol consumption.  Having a healthy diet containing fruits, vegetables, and a lot of fluids, Practicing safe sex.  Also maintaining proper hygiene of your body as well as the environment.    She will follow up with PCP for abdominal /Left flank pain-she is post hernia surgery 2021-she reports a lot of lower quadrant pain at hernia area.      Will see her back in 4 weeks For Follow up in clinic and recheck her urinalysis at that time.     May return sooner if need be.     Patient is agreeable to plan of care     Patient reports that she is not currently experiencing any symptoms of urinary incontinence.    Patient reports that she is not currently experiencing any symptoms of urinary incontinence.    Assessment:  1. Left side  pain without sciatica  2. Abdominal pain    Plan:    The plan because of her significant pain, is going to be schedule her for CT abdomen and pelvis and then I'm going to give her Toradol 60 mg IM x1 dose for pain. She will continue prn Toradol at home and also added Lidoderm patches.     Will follow up in about a month and review her CT scan results.    Will order a CT with contrast to examine kidneys and bladder for bladder tumors.    is NOT a  current cigarettes user. I have educated her on exposure to second hand smoke and  the risk of diseases from using tobacco products such as cancer, COPD and heart diease.     BMI is >= 30 and <35. (Class 1 Obesity). The following options were offered after discussion;: weight loss educational material (shared in after visit summary), exercise counseling/recommendations and nutrition counseling/recommendations    RADIOLOGY (CT AND/OR KUB):    CT Abdomen and Pelvis: No results found for this or any previous visit.     CT Stone Protocol: Results for orders placed during the hospital encounter of 11/07/18    CT Abdomen Pelvis Stone Protocol    Narrative  CT ABDOMEN PELVIS STONE PROTOCOL-    TECHNIQUE: Multiple axial CT images were obtained from lung bases  through pubic symphysis without administration of IV contrast.  Reformatted images in the coronal and/or sagittal plane(s) were  generated from the axial data set to facilitate diagnostic accuracy  and/or surgical planning.  Oral Contrast:NONE.    Radiation dose reduction techniques were utilized per ALARA protocol.  Automated exposure control was initiated through either or CareDose or  [email or web address] software packages by  protocol.    991.66 mGy.cm    Clinical information  flank pain    Comparison  NONE.    Findings  LOWER THORAX: Clear. No effusions.    ABDOMEN:    LIVER: Homogeneous. No focal hepatic mass or ductal dilatation.    GALLBLADDER: CHOLECYSTECTOMY CLIPS.    PANCREAS: Unremarkable. No mass or ductal  dilatation.    SPLEEN: Homogeneous. No splenomegaly.    ADRENALS: No mass.    KIDNEYS: No mass. No obstructive uropathy.  No evidence of  urolithiasis.    GI TRACT: Non-dilated. No definite wall thickening.    PERITONEUM: No free air. No free fluid or loculated fluid  collections.    MESENTERY: Unremarkable.    LYMPH NODES: No lymphadenopathy.    VASCULATURE: No evidence of aneurysm.    ABDOMINAL WALL: PARAUMBILICAL HERNIA WITH ORIFICE OF ABOUT 1 CM  CONTAINING A SMALL BOWEL LOOP WHICH DOES NOT APPEAR TO BE INCARCERATED  AT THIS TIME.    OTHER: None.    PELVIS:    BLADDER: No focal mass or significant wall thickening    REPRODUCTIVE: Unremarkable as visualized.    APPENDIX: Nondistended. No surrounding inflammation.    BONES: No acute bony abnormality.    Impression  Impression:  Paraumbilical hernia with orifice of about 1 cm containing a small bowel  loop which does not appear to be incarcerated at this time.    This report was finalized on 11/8/2018 6:57 AM by Dr. Bill Parham MD.     KUB: Results for orders placed in visit on 02/15/22    XR abdomen kub    Narrative  X-RAY ABDOMEN KUB    REASON FOR EXAM:  Left flank pain; N39.0-Urinary tract infection, site  not specified; R10.32-Left lower quadrant pain; R10.9-Unspecified  abdominal pain.    COMPARISON: Previous KUB from 03/2021.  There are clips in the right  upper quadrant from previous cholecystectomy. The bowel gas pattern  shows no evidence of obstruction. No soft tissue masses or pathological  calcifications other than vascular ones are demonstrated.    Impression  Nonspecific supine view of the abdomen.    This report was finalized on 2/15/2022 1:51 PM by Dr. Jose Manuel Ruiz II, MD.       LABS (3 MONTHS):    Office Visit on 01/06/2023   Component Date Value Ref Range Status   • Color 01/06/2023 Yellow  Yellow, Straw, Dark Yellow, Suzanne Final   • Clarity, UA 01/06/2023 Clear  Clear Final   • Specific Gravity  01/06/2023 1.010  1.005 - 1.030 Final   • pH,  Urine 01/06/2023 6.0  5.0 - 8.0 Final   • Leukocytes 01/06/2023 Negative  Negative Final   • Nitrite, UA 01/06/2023 Negative  Negative Final   • Protein, POC 01/06/2023 Negative  Negative mg/dL Final   • Glucose, UA 01/06/2023 Negative  Negative mg/dL Final   • Ketones, UA 01/06/2023 Negative  Negative Final   • Urobilinogen, UA 01/06/2023 Normal  Normal, 0.2 E.U./dL Final   • Bilirubin 01/06/2023 Negative  Negative Final   • Blood, UA 01/06/2023 Negative  Negative Final   • Lot Number 01/06/2023 n   Final   • Expiration Date 01/06/2023 n   Final   • Urine Culture 01/06/2023 No growth   Final      Smoking Cessation Counseling:  Former [email or web address] [email or web address]  Patient does not currently use any tobacco products.     Follow Up   Return in about 4 weeks (around 2/3/2023) for Next scheduled follow up, RECURRENT UTI/DYSURIA/DETRUSSOR INSTABILITY.    Patient was given instructions and counseling regarding her condition or for health maintenance advice. Please see specific information pulled into the AVS if appropriate.          This document has been electronically signed by Griselda Cheng-Akwa, APRN   January 9, 2023 20:15 EST      Dictated Utilizing Dragon Dictation: Part of this note may be an electronic transcription/translation of spoken language to printed text using the Dragon Dictation System.     Transcribed from ambient dictation for Griselda Cheng-Akwa, APRN by Mayra Plascencia.  01/06/23   14:35 EST    Patient or patient representative verbalized consent to the visit recording.  I have personally performed the services described in this document as transcribed by the above individual, and it is both accurate and complete.  Griselda Cheng-Akwa, APRN  1/9/2023  20:15 EST

## 2023-01-07 LAB — BACTERIA SPEC AEROBE CULT: NO GROWTH

## 2023-01-11 ENCOUNTER — TELEPHONE (OUTPATIENT)
Dept: UROLOGY | Facility: CLINIC | Age: 78
End: 2023-01-11
Payer: MEDICARE

## 2023-01-11 NOTE — TELEPHONE ENCOUNTER
I called pt to go over negative urine culture results left vm for pt to call back.    ----- Message from Griselda Cheng-Akwa, APRN sent at 1/9/2023 12:34 PM EST -----  Please kindly let patient know urine culture NEGATIVE AT THIS TIME. FOLLOW UP AS DISCUSSED

## 2023-01-12 ENCOUNTER — OFFICE VISIT (OUTPATIENT)
Dept: PULMONOLOGY | Facility: CLINIC | Age: 78
End: 2023-01-12
Payer: MEDICARE

## 2023-01-12 VITALS
TEMPERATURE: 97.3 F | HEART RATE: 63 BPM | SYSTOLIC BLOOD PRESSURE: 126 MMHG | WEIGHT: 226 LBS | DIASTOLIC BLOOD PRESSURE: 62 MMHG | OXYGEN SATURATION: 97 % | HEIGHT: 68 IN | BODY MASS INDEX: 34.25 KG/M2

## 2023-01-12 DIAGNOSIS — J96.11 CHRONIC RESPIRATORY FAILURE WITH HYPOXIA: ICD-10-CM

## 2023-01-12 DIAGNOSIS — R91.8 PULMONARY NODULES: ICD-10-CM

## 2023-01-12 DIAGNOSIS — F17.211 CIGARETTE NICOTINE DEPENDENCE IN REMISSION: ICD-10-CM

## 2023-01-12 DIAGNOSIS — J44.9 CHRONIC OBSTRUCTIVE PULMONARY DISEASE, UNSPECIFIED COPD TYPE: Primary | ICD-10-CM

## 2023-01-12 PROCEDURE — 99999 PR OFFICE/OUTPT VISIT,PROCEDURE ONLY: CPT | Performed by: PHYSICIAN ASSISTANT

## 2023-01-12 PROCEDURE — 99213 OFFICE O/P EST LOW 20 MIN: CPT | Performed by: PHYSICIAN ASSISTANT

## 2023-01-12 NOTE — PROGRESS NOTES
Subjective      Chief Complaint  COPD    Subjective      History of Present Illness  Mayra Hays is a 77 y.o. female who presents today to [facility] for a follow-up visit for COPD. Patient reports that her breathing is currently at baseline. She does have an occasional cough that is at baseline. She reports that she is compliant with her home inhaler regimen with Breztri and uses as needed Albuterol inhalers and DuoNeb treatments. She does use 2 L oxygen at night and on an as needed basis. She states that she was sent tanks of oxygen by [facility] and is requesting a portable oxygen concentrator to use. Her main complaint this visit is pain in the middle/lower back and her left flank which she was also complaining of during a recent outpatient visit with urology and a CT of her abdomen/pelvis was ordered and scheduled for 01/27.     COPD:      Current Outpatient Medications:   •  albuterol sulfate  (90 Base) MCG/ACT inhaler, Inhale 2 puffs Every 4 (Four) Hours As Needed for Wheezing or Shortness of Air., Disp: 18 g, Rfl: 8  •  apixaban (ELIQUIS) 5 MG tablet tablet, Take 1 tablet by mouth Every 12 (Twelve) Hours., Disp: 180 tablet, Rfl: 3  •  atorvastatin (LIPITOR) 20 MG tablet, Take 1 tablet by mouth Daily., Disp: 90 tablet, Rfl: 3  •  Budeson-Glycopyrrol-Formoterol (Breztri Aerosphere) 160-9-4.8 MCG/ACT aerosol inhaler, Inhale 2 puffs 2 (Two) Times a Day., Disp: 10.7 g, Rfl: 8  •  clindamycin (CLEOCIN) 300 MG capsule, Take 1 capsule by mouth 3 (Three) Times a Day., Disp: 21 capsule, Rfl: 0  •  esomeprazole (nexIUM) 20 MG capsule, Take 1 capsule by mouth Daily., Disp: , Rfl:   •  ipratropium-albuterol (DUO-NEB) 0.5-2.5 mg/3 ml nebulizer, Take 3 mL by nebulization 4 (Four) Times a Day As Needed for Wheezing or Shortness of Air., Disp: 360 mL, Rfl: 8  •  ketorolac (TORADOL) 10 MG tablet, Take 1 tablet by mouth Every 6 (Six) Hours As Needed for  "Moderate Pain., Disp: 20 tablet, Rfl: 0  •  lidocaine (Lidoderm) 5 %, Place 1 patch on the skin as directed by provider Daily for 30 days. Remove /Discard patch within 12 hours or as directed by MD, Disp: 30 patch, Rfl: 0  •  lisinopril (PRINIVIL,ZESTRIL) 5 MG tablet, Take 1 tablet by mouth Daily., Disp: 90 tablet, Rfl: 3  •  loratadine (CLARITIN) 10 MG tablet, Take 10 mg by mouth Daily As Needed for Allergies., Disp: , Rfl:   •  metFORMIN (GLUCOPHAGE) 500 MG tablet, Take 1 tablet by mouth 2 (two) times a day., Disp: , Rfl:   •  methocarbamol (Robaxin) 500 MG tablet, Take 1 tablet by mouth 3 (Three) Times a Day As Needed for Muscle Spasms., Disp: 30 tablet, Rfl: 2  •  O2 (OXYGEN), Inhale 2 L/min As Needed., Disp: , Rfl:   •  pantoprazole (PROTONIX) 40 MG EC tablet, Take 40 mg by mouth Daily As Needed (acid reflux)., Disp: , Rfl:   •  phenazopyridine (Pyridium) 200 MG tablet, Take 1 tablet by mouth 3 (Three) Times a Day As Needed for Bladder Spasms (stop after 3 days/use prn only)., Disp: 20 tablet, Rfl: 0  •  potassium chloride (K-DUR,KLOR-CON) 20 MEQ CR tablet, Take 20 mEq by mouth Daily., Disp: , Rfl:   •  spironolactone (ALDACTONE) 25 MG tablet, Take 1 tablet by mouth Daily., Disp: 90 tablet, Rfl: 3  •  Stool Softener 100 MG capsule, Take 1 capsule by mouth 2 (two) times a day., Disp: , Rfl:   •  topiramate (TOPAMAX) 25 MG tablet, Take 25 mg by mouth 2 (Two) Times a Day., Disp: , Rfl:   •  trimethoprim (TRIMPEX) 100 MG tablet, Take 1 tablet by mouth 2 (Two) Times a Day., Disp: 30 tablet, Rfl: 1      No Known Allergies    Objective     Objective   Vital Signs:  /62 (BP Location: Left arm, Patient Position: Sitting)   Pulse 63   Temp 97.3 °F (36.3 °C)   Ht 172.7 cm (67.99\")   Wt 103 kg (226 lb)   SpO2 97%   BMI 34.37 kg/m²   Estimated body mass index is 34.37 kg/m² as calculated from the following:    Height as of this encounter: 172.7 cm (67.99\").    Weight as of this encounter: 103 kg (226 " lb).    Past Medical History:   Diagnosis Date   • CHF (congestive heart failure) (HCC)    • Collapsed lung    • COPD (chronic obstructive pulmonary disease) (HCC)    • Coronary artery disease    • Diabetes mellitus (HCC)    • Elevated cholesterol    • GERD (gastroesophageal reflux disease)    • Hyperlipidemia    • Hypertension    • Myocardial infarction (HCC)    • Stroke (HCC)      Past Surgical History:   Procedure Laterality Date   • ABDOMINAL SURGERY     • CARDIAC CATHETERIZATION N/A 2017    Procedure: Left Heart Cath;  Surgeon: Jatinder Matias MD;  Location:  [facility];  Service:    • CARDIAC CATHETERIZATION N/A 2021    Procedure: Left Heart Cath;  Surgeon: Tolu Steinberg MD;  Location:  [facility];  Service: Cardiology;  Laterality: N/A;   • COLONOSCOPY     • ENDOSCOPY     • GALLBLADDER SURGERY     • VENTRAL HERNIA REPAIR N/A 2020    Procedure: VENTRAL HERNIA REPAIR LAPAROSCOPIC WITH DAVINCI ROBOT;  Surgeon: Petr Velásquez MD;  Location: [facility] OR;  Service: DaVinci;  Laterality: N/A;     Social History     Socioeconomic History   • Marital status: Single   Tobacco Use   • Smoking status: Former     Packs/day: 3.00     Years: 55.00     Pack years: 165.00     Types: Cigarettes     Quit date:      Years since quittin.0   • Smokeless tobacco: Never   Vaping Use   • Vaping Use: Never used   Substance and Sexual Activity   • Alcohol use: No   • Drug use: No   • Sexual activity: Defer        Physical Exam  Constitutional:       Appearance: Normal appearance. She is obese.   HENT:      Head: Normocephalic and atraumatic.      Right Ear: External ear normal.      Left Ear: External ear normal.      Nose: Nose normal.      Mouth/Throat:      Mouth: Mucous membranes are moist.      Pharynx: Oropharynx is clear.   Eyes:      Extraocular Movements: Extraocular movements intact.      Pupils: Pupils are equal, round, and reactive to light.    Cardiovascular:      Rate and Rhythm: Normal rate and regular rhythm.      Pulses: Normal pulses.      Heart sounds: Normal heart sounds. No murmur heard.    No friction rub. No gallop.   Pulmonary:      Effort: Pulmonary effort is normal. No respiratory distress.      Breath sounds: Normal breath sounds. No wheezing, rhonchi or rales.      Comments: Currently on room air  Abdominal:      General: There is no distension.   Musculoskeletal:      Cervical back: Normal range of motion.      Right lower leg: No edema.      Left lower leg: No edema.   Skin:     General: Skin is warm and dry.   Neurological:      Mental Status: She is alert.   Psychiatric:         Mood and Affect: Mood normal.         Behavior: Behavior normal. Behavior is cooperative.        Result Review :  The following labs and radiology results have been reviewed.    Lab Review:   No results found for: FEV1, FVC, [id number], TLC, DLCO  Office Visit on 01/06/2023   Component Date Value Ref Range Status   • Color 01/06/2023 Yellow  Yellow, Straw, Dark Yellow, Suzanne Final   • Clarity, UA 01/06/2023 Clear  Clear Final   • Specific Gravity  01/06/2023 1.010  1.005 - 1.030 Final   • pH, Urine 01/06/2023 6.0  5.0 - 8.0 Final   • Leukocytes 01/06/2023 Negative  Negative Final   • Nitrite, UA 01/06/2023 Negative  Negative Final   • Protein, POC 01/06/2023 Negative  Negative mg/dL Final   • Glucose, UA 01/06/2023 Negative  Negative mg/dL Final   • Ketones, UA 01/06/2023 Negative  Negative Final   • Urobilinogen, UA 01/06/2023 Normal  Normal, 0.2 E.U./dL Final   • Bilirubin 01/06/2023 Negative  Negative Final   • Blood, UA 01/06/2023 Negative  Negative Final   • Lot Number 01/06/2023 n   Final   • Expiration Date 01/06/2023 n   Final   • Urine Culture 01/06/2023 No growth   Final      Image 39 -- ? Pulmonary nodule versus pleural thickening of right upper lobe; also present on imaging in 2019       Image 65 -- calcified nodule of right lung; also present on  imaging in 2019      Image 78 -- small area of increased density in left lower lobe; also present on imaging in 2019      Assessment / Plan         Assessment   Diagnoses and all orders for this visit:    1. Chronic obstructive pulmonary disease, unspecified COPD type (HCC) (Primary)  -      CT Chest Low Dose Cancer Screening WO; Future    2. Cigarette nicotine dependence in remission  -      CT Chest Low Dose Cancer Screening WO; Future    3. Pulmonary nodules    4. Chronic respiratory failure with hypoxia (HCC)           -COPD   -Former tobacco abuse  · Continue Breztri inhaler  · Continue as needed albuterol and DuoNeb treatments  · Ordered annual LDCT screening scheduled for this month.   · May discuss repeating PFT's at next visit; last performed 2018    -Chronic respiratory failure with hypoxia  · Continue 2 L oxygen at nighttime and as needed.   · Patient requesting portable oxygen concentrator; contacted [facility] ([place]) and reports patient has to use 8 tanks in 3 consecutive months for insurance to approve.   · Requested Natacha device as patient unable to tolerate PAP device due to full facemask, but did not receive.     -Pulmonary nodules  · Reviewed CT chest imaging from November 2021 with imaging pictured above; appears stable compared to imaging from 2019.  · As noted above, ordered annual LDCT screening for later this month.     Mayra Hays  reports that she quit smoking about 8 years ago. Her smoking use included cigarettes. She has a 165.00 pack-year smoking history. She has never used smokeless tobacco..     Follow Up   Return in about 6 months (around 7/12/2023), or if symptoms worsen or fail to improve.    Patient was given instructions and counseling regarding her condition or for health maintenance advice. Please see specific information pulled into the AVS if appropriate.       This document has been electronically signed by Jocelyn Mendoza PA-C   January 12, 2023 17:33  EST    Dictated Utilizing Dragon Dictation: Part of this note may be an electronic transcription/translation of spoken language to printed text using the Dragon Dictation System.

## 2023-01-30 ENCOUNTER — HOSPITAL ENCOUNTER (OUTPATIENT)
Dept: CT IMAGING | Facility: HOSPITAL | Age: 78
Discharge: HOME | End: 2023-01-30
Admitting: NURSE PRACTITIONER
Payer: MEDICARE

## 2023-01-30 DIAGNOSIS — M54.50 CHRONIC LEFT-SIDED LOW BACK PAIN WITHOUT SCIATICA: Primary | ICD-10-CM

## 2023-01-30 DIAGNOSIS — G89.29 CHRONIC LEFT-SIDED LOW BACK PAIN WITHOUT SCIATICA: Primary | ICD-10-CM

## 2023-01-30 LAB — CREAT BLDA-MCNC: 1.1 MG/DL (ref 0.6–1.3)

## 2023-01-30 PROCEDURE — 74178 CT ABD&PLV WO CNTR FLWD CNTR: CPT

## 2023-01-30 PROCEDURE — 25010000002 IOPAMIDOL 61 % SOLUTION: Performed by: NURSE PRACTITIONER

## 2023-01-30 PROCEDURE — 82565 ASSAY OF CREATININE: CPT

## 2023-01-30 PROCEDURE — 74178 CT ABD&PLV WO CNTR FLWD CNTR: CPT | Performed by: RADIOLOGY

## 2023-01-30 RX ADMIN — IOPAMIDOL 80 ML: 612 INJECTION, SOLUTION INTRAVENOUS at 14:31

## 2023-02-02 ENCOUNTER — HOSPITAL ENCOUNTER (OUTPATIENT)
Dept: CT IMAGING | Facility: HOSPITAL | Age: 78
Discharge: HOME | End: 2023-02-02
Admitting: NURSE PRACTITIONER
Payer: MEDICARE

## 2023-02-02 DIAGNOSIS — M54.50 CHRONIC LEFT-SIDED LOW BACK PAIN WITHOUT SCIATICA: ICD-10-CM

## 2023-02-02 DIAGNOSIS — G89.29 CHRONIC LEFT-SIDED LOW BACK PAIN WITHOUT SCIATICA: ICD-10-CM

## 2023-02-02 PROCEDURE — 74176 CT ABD & PELVIS W/O CONTRAST: CPT | Performed by: RADIOLOGY

## 2023-02-02 PROCEDURE — 74176 CT ABD & PELVIS W/O CONTRAST: CPT

## 2023-02-03 ENCOUNTER — OFFICE VISIT (OUTPATIENT)
Dept: UROLOGY | Facility: CLINIC | Age: 78
End: 2023-02-03
Payer: MEDICARE

## 2023-02-03 VITALS
BODY MASS INDEX: 34.25 KG/M2 | DIASTOLIC BLOOD PRESSURE: 74 MMHG | HEART RATE: 83 BPM | SYSTOLIC BLOOD PRESSURE: 119 MMHG | HEIGHT: 68 IN | WEIGHT: 226 LBS

## 2023-02-03 DIAGNOSIS — N30.00 ACUTE CYSTITIS WITHOUT HEMATURIA: ICD-10-CM

## 2023-02-03 DIAGNOSIS — R10.9 BILATERAL FLANK PAIN: ICD-10-CM

## 2023-02-03 DIAGNOSIS — N39.0 URINARY TRACT INFECTION WITHOUT HEMATURIA, SITE UNSPECIFIED: Primary | ICD-10-CM

## 2023-02-03 LAB
BILIRUB BLD-MCNC: ABNORMAL MG/DL
CLARITY, POC: CLEAR
COLOR UR: YELLOW
EXPIRATION DATE: ABNORMAL
GLUCOSE UR STRIP-MCNC: NEGATIVE MG/DL
KETONES UR QL: NEGATIVE
LEUKOCYTE EST, POC: NEGATIVE
Lab: ABNORMAL
NITRITE UR-MCNC: NEGATIVE MG/ML
PH UR: 6 [PH] (ref 5–8)
PROT UR STRIP-MCNC: NEGATIVE MG/DL
RBC # UR STRIP: NEGATIVE /UL
SP GR UR: 1.02 (ref 1–1.03)
UROBILINOGEN UR QL: NORMAL

## 2023-02-03 PROCEDURE — 87086 URINE CULTURE/COLONY COUNT: CPT | Performed by: NURSE PRACTITIONER

## 2023-02-03 PROCEDURE — 51798 US URINE CAPACITY MEASURE: CPT | Performed by: NURSE PRACTITIONER

## 2023-02-03 PROCEDURE — 99214 OFFICE O/P EST MOD 30 MIN: CPT | Performed by: NURSE PRACTITIONER

## 2023-02-03 PROCEDURE — 81003 URINALYSIS AUTO W/O SCOPE: CPT | Performed by: NURSE PRACTITIONER

## 2023-02-03 NOTE — PROGRESS NOTES
Chief Complaint  Cystitis (One month follow up for ACUTE CYSTITIS/FLANK/ABDOMINAL PAIN)    Ravi Hays presents to [facility] for  Acute cystitis/flank/abdominal pain  History of Present Illness    Mrs. Mayra Hays is a pleasant 77-year-old female who returns to clinic today for evaluation. This is her 1-month follow-up for significant issues with E. coli recurrent UTI, acute cystitis with hematuria, detrusor instability. When she was  evaluated in clinic last month, she reported doing relatively better on antibiotic prophylaxis with Macrobid which she had since stopped.     However, she also reported issues with urine frequency and urgency, abdominal and flank pain which had been persistent the last month keeping her awake at night. She reported that she had a hernia repaired in the past and was concerned if the discomfort was coming from the hernia repair area, because it was mostly localized at that side. She also reported straining with bowel movements, reported back pain and bilateral flank pain.     We discussed repeating a urine culture which apparently was negative, we planned on repeating a CT abdomen and pelvis, done 01/31/23, which I have reviewed today showing a tiny non-obstructing stone in her right kidney. There is no solid mass, no hydronephrosis, or no distal ureteral stones. Her urinary bladder was decompressed and the bladder wall appears thickened. Otherwise, her CT also showed moderate to large volume stool which is consistent with severe constipation.     Overall, on her reevaluation this morning, her urine dipstick is completely negative for any infection. It is negative for gross/microscopic hematuria and her postvoid residual is 0 ml.    She is accompanied by her daughter-in-law, Corona today. She states she has been doing well. She denies any burning with urination. She denies any back pain.She denies any urinary symptoms,  "and reports overall she is pleased.    Nevertheless, Her daughter-in-law states her bowels are \"the worst.\"  Miralax, we discussed GI follow up and she is agreeable.    The rest of her PMHX as noted in chart.    Objective   Vital Signs:   /74 (BP Location: Left arm, Patient Position: Sitting)   Pulse 83   Ht 172.7 cm (67.99\")   Wt 103 kg (226 lb)   BMI 34.37 kg/m²       ROS:   Review of Systems   Constitutional: Positive for activity change. Negative for appetite change, chills, diaphoresis, fatigue, fever, unexpected weight gain and unexpected weight loss.   HENT: Negative for congestion, ear discharge, ear pain, nosebleeds, rhinorrhea, sinus pressure and sore throat.    Eyes: Negative for blurred vision, double vision, photophobia, pain, redness and visual disturbance.   Respiratory: Positive for cough. Negative for apnea, chest tightness, shortness of breath, wheezing and stridor.    Cardiovascular: Negative for chest pain and palpitations.   Gastrointestinal: Positive for abdominal distention and abdominal pain. Negative for constipation, diarrhea, nausea and vomiting.   Endocrine: Negative for polydipsia, polyphagia and polyuria.   Genitourinary: Positive for flank pain, frequency, pelvic pain, pelvic pressure and urgency. Negative for decreased urine volume, difficulty urinating, dyspareunia, dysuria, genital sores, hematuria, urinary incontinence and vaginal discharge.   Musculoskeletal: Positive for back pain and myalgias. Negative for arthralgias and joint swelling.   Skin: Negative for pallor, rash and wound.   Neurological: Negative for dizziness, tremors, syncope, weakness, light-headedness, headache, memory problem and confusion.   Psychiatric/Behavioral: Positive for stress. Negative for behavioral problems and decreased concentration.        Physical Exam  Constitutional:       General: She is in acute distress.      Appearance: She is well-developed. She is obese. She is ill-appearing. "   HENT:      Head: Normocephalic and atraumatic.   Eyes:      Pupils: Pupils are equal, round, and reactive to light.   Neck:      Thyroid: No thyromegaly.      Trachea: No tracheal deviation.   Cardiovascular:      Rate and Rhythm: Normal rate and regular rhythm.      Heart sounds: No murmur heard.  Pulmonary:      Effort: Pulmonary effort is normal. No respiratory distress.      Breath sounds: Normal breath sounds. No stridor. No wheezing.   Abdominal:      General: Bowel sounds are normal. There is distension.      Palpations: Abdomen is soft.      Tenderness: There is abdominal tenderness. There is guarding.   Genitourinary:     Labia:         Right: No tenderness.         Left: No tenderness.       Vagina: Normal. No vaginal discharge.      Comments: Urine freq  Musculoskeletal:         General: Tenderness present. No deformity. Normal range of motion.      Cervical back: Normal range of motion.   Skin:     General: Skin is warm and dry.      Capillary Refill: Capillary refill takes less than 2 seconds.      Coloration: Skin is pale.      Findings: No erythema or rash.   Neurological:      Mental Status: She is alert and oriented to person, place, and time.      Cranial Nerves: No cranial nerve deficit.      Sensory: No sensory deficit.      Coordination: Coordination normal.   Psychiatric:         Behavior: Behavior normal.         Thought Content: Thought content normal.         Judgment: Judgment normal.        Result Review :     UA    Urinalysis 9/6/22 1/6/23 2/3/23   Ketones, UA Negative Negative Negative   Leukocytes, UA Negative Negative Negative           Urine Culture    Urine Culture 2/15/22 9/6/22 1/6/23   Urine Culture >100,000 CFU/mL Mixed Yareli Isolated 50,000 CFU/mL Escherichia coli (A) No growth   (A) Abnormal value                 Assessment and Plan    Problem List Items Addressed This Visit        Genitourinary and Reproductive     Urinary tract infection without hematuria - Primary     Relevant Orders    POC Urinalysis Dipstick, Automated (Completed)    Urine Culture - Urine, Urine, Clean Catch    Bladder Scan (Completed)   Other Visit Diagnoses     Bilateral flank pain                                                                    ASSESSMENT         ACUTE CYSTITIS/RECURRENT UTI/ALANK PAIN/ABDOMINAL PAIN  Ms. Mayra ARCHER is a very pleasant 77 year-old female who returns to clinic today for evaluation.  This is HER 3 month follow-up for E. coli recurrent UTI, acute cystitis with hematuria, and overactive bladder.  Patient was on suppressive therapy with Macrobid daily post anticholinergic Myrbetriq but stopped.  Today, She reports  having many more episodes of right lower quadrant abdominal pain, and lower back pain.  She reports pelvic pressure and suprapubic discomfort, but however denies having any urinary symptoms of frequency, urgency, or dysuria.  Denies any burning on urination, she denies any urinary incontinent episodes.  Does not wear any pads or depends currently.  She still has nocturia occasionally 1-2 times due to her uncontrolled late night po fluids intake, she denies fever or chills, she does not have nausea, vomiting, or diarrhea.  Her Urine dipstick today is completely negative for any infection, it is negative for gross/microscopic hematuria. Her PVR is 96cc     Again, We discussed the risk factors for recurrent infections being intercourse in younger patients and atrophic changes in older patients.  We discussed the symptoms that are found including pain, pressure, burning, frequency, urgency suprapubic pain and painful intercourse. I discussed upper tract symptoms including fevers, chills, and indicated the workup would be much more aggressive if the patient were to present with recurrent infections in the face of upper tract symptomatology such as fever.                            Again, we discussed detrusor instability which is an  irritative bladder symptomatology  most likely related to factors such as intake of bladder irritants, postinfectious irritation, prolapse, with a very large differential diagnosis.  The mainstay of treatment has been tight cholinergics which basically caused the bladder to have decreased contractility.  We have discussed the side effects of these treatments including dry mouth, double vision, and increasing constipation.  She reports doing well  on oxybutynin for symptomatic relief.    IBS- Patient has significant irritable bowel syndrome, characterized by extreme amounts of constipation.  We spoke about the impact of this on bladder function.  We spoke about  its relationship to recurrent urinary tract infections. We discussed the need for increasing p.o. fluid intake to at least 2 to 3 L of water daily, and discussed the physiology of colonic motility as well as use of MiraLAX as a bulk laxative versus the newer class of serotonin uptake blockers such as Linzess.  We stressed the need for a daily bowel movement and discussed the [place] stool scale at length.We also discussed a referral to the GI nurse practitioner                                                    PLAN     Will RESend her urine out for culture, I will call her WITH RESULTS IF ANY positive urine culture.     She has been encouraged to increase her p.o. fluid intake to at least 1 to 2 L daily avoiding any bladder irritants such as caffeine products citrus and spicy foods.      I recommend concomitant probiotics  to protect the rectal reservoir including over-the-counter yogurt preparations to sincere oral pills containing the appropriate probiotics. Patient reports the diligent use of Probiotics.     Discussed things she can do to help her urine frequency such as keeping the bladder diary with strict intake and output of what she eats or drinks, how often she urinates, how much she urinates.  She should follow a bladder training program, and do Keagle exercises to strengthen the  muscles to help control urination.     TORADOL 60 MG IM X 1 DOSE GIVEN IN CLINIC FOR FLAN/BACK PAIN      We discussed OTHER treatment  options for her flank pain with patient encouraged to continue conservative therapy alternating NSAID/Tylenol as tolerated, Also including hot baths, showers, warm compresses to lower back as tolerated. Also encouraged walking, physical therapy, light exercises as tolerated     Rest is the most important treatment in the case of flank pain. Rest and physical therapy are enough to improve minor pain. Discussed to monitor her daily routine with prevention of flank pain by: At least Drinking 8 glasses of water per day, Limiting your alcohol consumption.  Having a healthy diet containing fruits, vegetables, and a lot of fluids, Practicing safe sex.  Also maintaining proper hygiene of your body as well as the environment.     She will follow up with PCP for abdominal /Left flank pain if it persits-she is post hernia surgery 2021-she reports a lot of lower quadrant pain at hernia area.      Will see her back in 6 months For Follow up in clinic and recheck her urinalysis at that time.     May return sooner if need be.     Patient is agreeable to plan of care     Patient reports that she is not currently experiencing any symptoms of urinary incontinence.     Patient reports that she is not currently experiencing any symptoms of urinary incontinence.     Patient reports that she is not currently experiencing any symptoms of urinary incontinence.    BMI is >= 30 and <35. (Class 1 Obesity). The following options were offered after discussion;: weight loss educational material (shared in after visit summary), exercise counseling/recommendations and nutrition counseling/recommendations      RADIOLOGY (CT AND/OR KUB):    CT Abdomen and Pelvis: Results for orders placed in visit on 01/06/23    CT Abdomen Pelvis With & Without Contrast    Narrative  EXAM: CT ABDOMEN PELVIS W WO  CONTRAST-      TECHNIQUE: Multiple axial CT images were obtained from lung bases  through pubic symphysis WITHOUT AND THEN AFTER THE administration of IV  contrast. Reformatted images in the coronal and/or sagittal plane(s)  were generated from the axial data set to facilitate diagnostic accuracy  and/or surgical planning.  Oral Contrast:NONE.    Radiation dose reduction techniques were utilized per ALARA protocol.  Automated exposure control was initiated through either or [employer] or  DoseRight software packages by  protocol.  DOSE:    Clinical information Abdominal pain, acute, nonlocalized    Comparison 05/12/2020    FINDINGS:    Lower thorax: Clear. No effusions.    Abdomen:    Liver: Homogeneous. No focal hepatic mass or ductal dilatation.    Gallbladder: Surgically absent    Pancreas: Unremarkable. No mass or ductal dilatation.    Spleen: Homogeneous. No splenomegaly.    Adrenals: No mass.    Kidneys/ureters: No mass. No obstructive uropathy.  No evidence of  urolithiasis.    GI tract: Hiatal hernia.. No evidence of bowel obstruction    MESENTERY: No free fluid, walled off fluid collections, mesenteric  stranding, or enlarged lymph nodes      Vasculature: No evidence of aneurysm.    Abdominal wall: No focal hernia or mass.      Bladder: No focal mass or significant wall thickening    Reproductive: Arthritic change in the spine    Bones: No acute bony abnormality.    Impression  1. No definitive source for the patient's symptoms identified on today's  exam.      2. Other incidental findings as discussed above.    This report was finalized on 1/30/2023 2:40 PM by Dr. Mauricio Santiago MD.     CT Stone Protocol: Results for orders placed during the hospital encounter of 11/07/18    CT Abdomen Pelvis Stone Protocol    Narrative  CT ABDOMEN PELVIS STONE PROTOCOL-    TECHNIQUE: Multiple axial CT images were obtained from lung bases  through pubic symphysis without administration of IV contrast.  Reformatted  images in the coronal and/or sagittal plane(s) were  generated from the axial data set to facilitate diagnostic accuracy  and/or surgical planning.  Oral Contrast:NONE.    Radiation dose reduction techniques were utilized per ALARA protocol.  Automated exposure control was initiated through either or [employer] or  DoseRight software packages by  protocol.    991.66 mGy.cm    Clinical information  flank pain    Comparison  NONE.    Findings  LOWER THORAX: Clear. No effusions.    ABDOMEN:    LIVER: Homogeneous. No focal hepatic mass or ductal dilatation.    GALLBLADDER: CHOLECYSTECTOMY CLIPS.    PANCREAS: Unremarkable. No mass or ductal dilatation.    SPLEEN: Homogeneous. No splenomegaly.    ADRENALS: No mass.    KIDNEYS: No mass. No obstructive uropathy.  No evidence of  urolithiasis.    GI TRACT: Non-dilated. No definite wall thickening.    PERITONEUM: No free air. No free fluid or loculated fluid  collections.    MESENTERY: Unremarkable.    LYMPH NODES: No lymphadenopathy.    VASCULATURE: No evidence of aneurysm.    ABDOMINAL WALL: PARAUMBILICAL HERNIA WITH ORIFICE OF ABOUT 1 CM  CONTAINING A SMALL BOWEL LOOP WHICH DOES NOT APPEAR TO BE INCARCERATED  AT THIS TIME.    OTHER: None.    PELVIS:    BLADDER: No focal mass or significant wall thickening    REPRODUCTIVE: Unremarkable as visualized.    APPENDIX: Nondistended. No surrounding inflammation.    BONES: No acute bony abnormality.    Impression  Impression:  Paraumbilical hernia with orifice of about 1 cm containing a small bowel  loop which does not appear to be incarcerated at this time.    This report was finalized on 11/8/2018 6:57 AM by Dr. Bill Parham MD.     KUB: Results for orders placed in visit on 02/15/22    XR abdomen kub    Narrative  X-RAY ABDOMEN KUB    REASON FOR EXAM:  Left flank pain; N39.0-Urinary tract infection, site  not specified; R10.32-Left lower quadrant pain; R10.9-Unspecified  abdominal pain.    COMPARISON: Previous KUB  from 03/2021.  There are clips in the right  upper quadrant from previous cholecystectomy. The bowel gas pattern  shows no evidence of obstruction. No soft tissue masses or pathological  calcifications other than vascular ones are demonstrated.    Impression  Nonspecific supine view of the abdomen.    This report was finalized on 2/15/2022 1:51 PM by Dr. Jose Manuel Ruiz II, MD.       LABS (3 MONTHS):    Office Visit on 02/03/2023   Component Date Value Ref Range Status   • Color 02/03/2023 Yellow  Yellow, Straw, Dark Yellow, Suzanne Final   • Clarity, UA 02/03/2023 Clear  Clear Final   • Specific Gravity  02/03/2023 1.025  1.005 - 1.030 Final   • pH, Urine 02/03/2023 6.0  5.0 - 8.0 Final   • Leukocytes 02/03/2023 Negative  Negative Final   • Nitrite, UA 02/03/2023 Negative  Negative Final   • Protein, POC 02/03/2023 Negative  Negative mg/dL Final   • Glucose, UA 02/03/2023 Negative  Negative mg/dL Final   • Ketones, UA 02/03/2023 Negative  Negative Final   • Urobilinogen, UA 02/03/2023 Normal  Normal, 0.2 E.U./dL Final   • Bilirubin 02/03/2023 1 mg/dL (A)  Negative Final   • Blood, UA 02/03/2023 Negative  Negative Final   • Lot Number 02/03/2023 [id number]   Final   • Expiration Date 02/03/2023 [id number]   Final   Hospital Outpatient Visit on 01/30/2023   Component Date Value Ref Range Status   • Creatinine 01/30/2023 1.10  0.60 - 1.30 mg/dL Final    Serial Number: [id number]  [id number]   Office Visit on 01/06/2023   Component Date Value Ref Range Status   • Color 01/06/2023 Yellow  Yellow, Straw, Dark Yellow, Suzanne Final   • Clarity, UA 01/06/2023 Clear  Clear Final   • Specific Gravity  01/06/2023 1.010  1.005 - 1.030 Final   • pH, Urine 01/06/2023 6.0  5.0 - 8.0 Final   • Leukocytes 01/06/2023 Negative  Negative Final   • Nitrite, UA 01/06/2023 Negative  Negative Final   • Protein, POC 01/06/2023 Negative  Negative mg/dL Final   • Glucose, UA 01/06/2023 Negative  Negative mg/dL Final   • Ketones, UA 01/06/2023  Negative  Negative Final   • Urobilinogen, UA 01/06/2023 Normal  Normal, 0.2 E.U./dL Final   • Bilirubin 01/06/2023 Negative  Negative Final   • Blood, UA 01/06/2023 Negative  Negative Final   • Lot Number 01/06/2023 n   Final   • Expiration Date 01/06/2023 n   Final   • Urine Culture 01/06/2023 No growth   Final          Smoking Cessation Counseling:  Never a smoker.  Patient does not currently use any tobacco products.     Follow Up   Return in about 6 months (around 8/3/2023) for Next scheduled follow up, RECURRENT UTI/DYSURIA/DETRUSSOR INSTABILITY.    Patient was given instructions and counseling regarding her condition or for health maintenance advice. Please see specific information pulled into the AVS if appropriate.          This document has been electronically signed by Griselda Cheng-Akwa, APRN   February 4, 2023 00:19 EST      Dictated Utilizing Dragon Dictation: Part of this note may be an electronic transcription/translation of spoken language to printed text using the Dragon Dictation System.     Transcribed from ambient dictation for Griselda Cheng-Akwa, APRN by Suzanne Pierre.  02/03/23   08:45 EST    Patient or patient representative verbalized consent to the visit recording.  I have personally performed the services described in this document as transcribed by the above individual, and it is both accurate and complete.  Griselda Cheng-Akwa, APRN  2/4/2023  00:19 EST

## 2023-02-04 LAB — BACTERIA SPEC AEROBE CULT: NO GROWTH

## 2023-02-06 ENCOUNTER — TELEPHONE (OUTPATIENT)
Dept: UROLOGY | Facility: CLINIC | Age: 78
End: 2023-02-06
Payer: MEDICARE

## 2023-02-06 NOTE — TELEPHONE ENCOUNTER
----- Message from Griselda Cheng-Akwa, APRN sent at 2/5/2023  4:30 AM EST -----  PLEASE LET PATIENT KNOW HER URINE CULTURE IS NEGATIVE AT THIS TIME. CONTINUE HER THERAPY AND FOLLOW UP AS DISCUSSED. [place]

## 2023-02-14 ENCOUNTER — TELEPHONE (OUTPATIENT)
Dept: PULMONOLOGY | Facility: CLINIC | Age: 78
End: 2023-02-14
Payer: MEDICARE

## 2023-02-14 ENCOUNTER — HOSPITAL ENCOUNTER (OUTPATIENT)
Dept: CT IMAGING | Facility: HOSPITAL | Age: 78
Discharge: HOME | End: 2023-02-14
Admitting: PHYSICIAN ASSISTANT
Payer: MEDICARE

## 2023-02-14 DIAGNOSIS — R91.1 PULMONARY NODULE: Primary | ICD-10-CM

## 2023-02-14 DIAGNOSIS — F17.211 CIGARETTE NICOTINE DEPENDENCE IN REMISSION: ICD-10-CM

## 2023-02-14 DIAGNOSIS — J44.9 CHRONIC OBSTRUCTIVE PULMONARY DISEASE, UNSPECIFIED COPD TYPE: ICD-10-CM

## 2023-02-14 PROCEDURE — 71271 CT THORAX LUNG CANCER SCR C-: CPT | Performed by: RADIOLOGY

## 2023-02-14 PROCEDURE — 71271 CT THORAX LUNG CANCER SCR C-: CPT

## 2023-02-14 RX ORDER — SODIUM CHLORIDE 0.9 % (FLUSH) 0.9 %
10 SYRINGE (ML) INJECTION EVERY 12 HOURS SCHEDULED
Status: CANCELLED | OUTPATIENT
Start: 2023-02-27

## 2023-02-14 RX ORDER — SODIUM CHLORIDE 0.9 % (FLUSH) 0.9 %
10 SYRINGE (ML) INJECTION AS NEEDED
Status: CANCELLED | OUTPATIENT
Start: 2023-02-27

## 2023-02-14 RX ORDER — LIDOCAINE HYDROCHLORIDE 10 MG/ML
2.5 INJECTION, SOLUTION EPIDURAL; INFILTRATION; INTRACAUDAL; PERINEURAL
Status: CANCELLED | OUTPATIENT
Start: 2023-02-27

## 2023-02-14 RX ORDER — ALBUTEROL SULFATE 2.5 MG/3ML
2.5 SOLUTION RESPIRATORY (INHALATION)
Status: CANCELLED | OUTPATIENT
Start: 2023-02-27

## 2023-02-14 RX ORDER — SODIUM CHLORIDE 9 MG/ML
40 INJECTION, SOLUTION INTRAVENOUS AS NEEDED
Status: CANCELLED | OUTPATIENT
Start: 2023-02-27

## 2023-02-14 NOTE — TELEPHONE ENCOUNTER
Contacted patient to discuss LDCT scan results obtained today (02/14/23). Spoke with her daughter/POA Hailey. Informed daughter that lung nodule of right upper lobe has increased in size to approximately 2 cm compared to approximately 9 mm from November 2021. Discussed possible consideration for bronchoscopy and patient was agreeable.   Discussed case with Dr. Peter and states he will do EBUS on 02/27. Patient is on Eliquis due to history of atrial fibrillation. Educated to hold anticoagulation 02/25-02/27.     · Ordered Pre-bronchoscopy labs.   · Ordered case request for EBUS on 02/27.

## 2023-02-27 ENCOUNTER — ANESTHESIA (OUTPATIENT)
Dept: PERIOP | Facility: HOSPITAL | Age: 78
End: 2023-02-27
Payer: MEDICARE

## 2023-02-27 ENCOUNTER — ANESTHESIA EVENT (OUTPATIENT)
Dept: PERIOP | Facility: HOSPITAL | Age: 78
End: 2023-02-27
Payer: MEDICARE

## 2023-02-27 ENCOUNTER — HOSPITAL ENCOUNTER (OUTPATIENT)
Facility: HOSPITAL | Age: 78
Setting detail: HOSPITAL OUTPATIENT SURGERY
Discharge: HOME | End: 2023-02-27
Attending: INTERNAL MEDICINE | Admitting: INTERNAL MEDICINE
Payer: MEDICARE

## 2023-02-27 VITALS
BODY MASS INDEX: 39.24 KG/M2 | RESPIRATION RATE: 18 BRPM | SYSTOLIC BLOOD PRESSURE: 136 MMHG | HEIGHT: 67 IN | OXYGEN SATURATION: 97 % | TEMPERATURE: 97.7 F | DIASTOLIC BLOOD PRESSURE: 75 MMHG | HEART RATE: 61 BPM | WEIGHT: 250 LBS

## 2023-02-27 DIAGNOSIS — R91.1 PULMONARY NODULE: ICD-10-CM

## 2023-02-27 LAB
ALBUMIN SERPL-MCNC: 3.9 G/DL (ref 3.5–5.2)
ALBUMIN/GLOB SERPL: 1.1 G/DL
ALP SERPL-CCNC: 111 U/L (ref 39–117)
ALT SERPL W P-5'-P-CCNC: 11 U/L (ref 1–33)
ANION GAP SERPL CALCULATED.3IONS-SCNC: 9.5 MMOL/L (ref 5–15)
APTT PPP: 25.3 SECONDS (ref 26.5–34.5)
AST SERPL-CCNC: 18 U/L (ref 1–32)
BILIRUB SERPL-MCNC: 0.3 MG/DL (ref 0–1.2)
BUN SERPL-MCNC: 15 MG/DL (ref 8–23)
BUN/CREAT SERPL: 14.9 (ref 7–25)
CALCIUM SPEC-SCNC: 9.4 MG/DL (ref 8.6–10.5)
CHLORIDE SERPL-SCNC: 102 MMOL/L (ref 98–107)
CO2 SERPL-SCNC: 25.5 MMOL/L (ref 22–29)
CREAT SERPL-MCNC: 1.01 MG/DL (ref 0.57–1)
DEPRECATED RDW RBC AUTO: 40 FL (ref 37–54)
EGFRCR SERPLBLD CKD-EPI 2021: 57.8 ML/MIN/1.73
ERYTHROCYTE [DISTWIDTH] IN BLOOD BY AUTOMATED COUNT: 12.7 % (ref 12.3–15.4)
GLOBULIN UR ELPH-MCNC: 3.6 GM/DL
GLUCOSE BLDC GLUCOMTR-MCNC: 107 MG/DL (ref 70–130)
GLUCOSE SERPL-MCNC: 113 MG/DL (ref 65–99)
HCT VFR BLD AUTO: 39.8 % (ref 34–46.6)
HGB BLD-MCNC: 12.6 G/DL (ref 12–15.9)
INR PPP: 0.91 (ref 0.9–1.1)
MCH RBC QN AUTO: 27.5 PG (ref 26.6–33)
MCHC RBC AUTO-ENTMCNC: 31.7 G/DL (ref 31.5–35.7)
MCV RBC AUTO: 86.7 FL (ref 79–97)
PLATELET # BLD AUTO: 245 10*3/MM3 (ref 140–450)
PMV BLD AUTO: 11.1 FL (ref 6–12)
POTASSIUM SERPL-SCNC: 5 MMOL/L (ref 3.5–5.2)
PROT SERPL-MCNC: 7.5 G/DL (ref 6–8.5)
PROTHROMBIN TIME: 12.5 SECONDS (ref 12.1–14.7)
RBC # BLD AUTO: 4.59 10*6/MM3 (ref 3.77–5.28)
SODIUM SERPL-SCNC: 137 MMOL/L (ref 136–145)
WBC NRBC COR # BLD: 6.97 10*3/MM3 (ref 3.4–10.8)

## 2023-02-27 PROCEDURE — 82962 GLUCOSE BLOOD TEST: CPT

## 2023-02-27 PROCEDURE — 88173 CYTOPATH EVAL FNA REPORT: CPT

## 2023-02-27 PROCEDURE — 80053 COMPREHEN METABOLIC PANEL: CPT | Performed by: PHYSICIAN ASSISTANT

## 2023-02-27 PROCEDURE — 87116 MYCOBACTERIA CULTURE: CPT | Performed by: INTERNAL MEDICINE

## 2023-02-27 PROCEDURE — 87077 CULTURE AEROBIC IDENTIFY: CPT | Performed by: INTERNAL MEDICINE

## 2023-02-27 PROCEDURE — 94640 AIRWAY INHALATION TREATMENT: CPT

## 2023-02-27 PROCEDURE — 25010000002 PROPOFOL 10 MG/ML EMULSION: Performed by: NURSE ANESTHETIST, CERTIFIED REGISTERED

## 2023-02-27 PROCEDURE — 31624 DX BRONCHOSCOPE/LAVAGE: CPT | Performed by: INTERNAL MEDICINE

## 2023-02-27 PROCEDURE — 87102 FUNGUS ISOLATION CULTURE: CPT | Performed by: INTERNAL MEDICINE

## 2023-02-27 PROCEDURE — 85610 PROTHROMBIN TIME: CPT | Performed by: PHYSICIAN ASSISTANT

## 2023-02-27 PROCEDURE — 87205 SMEAR GRAM STAIN: CPT | Performed by: INTERNAL MEDICINE

## 2023-02-27 PROCEDURE — 87206 SMEAR FLUORESCENT/ACID STAI: CPT | Performed by: INTERNAL MEDICINE

## 2023-02-27 PROCEDURE — 94799 UNLISTED PULMONARY SVC/PX: CPT

## 2023-02-27 PROCEDURE — 0202U NFCT DS 22 TRGT SARS-COV-2: CPT | Performed by: INTERNAL MEDICINE

## 2023-02-27 PROCEDURE — 85730 THROMBOPLASTIN TIME PARTIAL: CPT | Performed by: PHYSICIAN ASSISTANT

## 2023-02-27 PROCEDURE — 31652 BRONCH EBUS SAMPLNG 1/2 NODE: CPT | Performed by: INTERNAL MEDICINE

## 2023-02-27 PROCEDURE — 88305 TISSUE EXAM BY PATHOLOGIST: CPT

## 2023-02-27 PROCEDURE — 88112 CYTOPATH CELL ENHANCE TECH: CPT

## 2023-02-27 PROCEDURE — 94664 DEMO&/EVAL PT USE INHALER: CPT

## 2023-02-27 PROCEDURE — 87071 CULTURE AEROBIC QUANT OTHER: CPT | Performed by: INTERNAL MEDICINE

## 2023-02-27 PROCEDURE — 31623 DX BRONCHOSCOPE/BRUSH: CPT | Performed by: INTERNAL MEDICINE

## 2023-02-27 PROCEDURE — 87186 SC STD MICRODIL/AGAR DIL: CPT | Performed by: INTERNAL MEDICINE

## 2023-02-27 PROCEDURE — 85027 COMPLETE CBC AUTOMATED: CPT | Performed by: PHYSICIAN ASSISTANT

## 2023-02-27 RX ORDER — SODIUM CHLORIDE 9 MG/ML
INJECTION, SOLUTION INTRAVENOUS AS NEEDED
Status: DISCONTINUED | OUTPATIENT
Start: 2023-02-27 | End: 2023-02-27 | Stop reason: HOSPADM

## 2023-02-27 RX ORDER — SODIUM CHLORIDE 0.9 % (FLUSH) 0.9 %
10 SYRINGE (ML) INJECTION EVERY 12 HOURS SCHEDULED
Status: DISCONTINUED | OUTPATIENT
Start: 2023-02-27 | End: 2023-02-27 | Stop reason: HOSPADM

## 2023-02-27 RX ORDER — SODIUM CHLORIDE 9 MG/ML
40 INJECTION, SOLUTION INTRAVENOUS AS NEEDED
Status: DISCONTINUED | OUTPATIENT
Start: 2023-02-27 | End: 2023-02-27 | Stop reason: HOSPADM

## 2023-02-27 RX ORDER — MIDAZOLAM HYDROCHLORIDE 1 MG/ML
0.5 INJECTION INTRAMUSCULAR; INTRAVENOUS
Status: DISCONTINUED | OUTPATIENT
Start: 2023-02-27 | End: 2023-02-27 | Stop reason: HOSPADM

## 2023-02-27 RX ORDER — ALBUTEROL SULFATE 2.5 MG/3ML
2.5 SOLUTION RESPIRATORY (INHALATION) ONCE
Status: COMPLETED | OUTPATIENT
Start: 2023-02-27 | End: 2023-02-27

## 2023-02-27 RX ORDER — KETOROLAC TROMETHAMINE 30 MG/ML
15 INJECTION, SOLUTION INTRAMUSCULAR; INTRAVENOUS EVERY 6 HOURS PRN
Status: DISCONTINUED | OUTPATIENT
Start: 2023-02-27 | End: 2023-02-27 | Stop reason: HOSPADM

## 2023-02-27 RX ORDER — SODIUM CHLORIDE, SODIUM LACTATE, POTASSIUM CHLORIDE, CALCIUM CHLORIDE 600; 310; 30; 20 MG/100ML; MG/100ML; MG/100ML; MG/100ML
100 INJECTION, SOLUTION INTRAVENOUS ONCE AS NEEDED
Status: DISCONTINUED | OUTPATIENT
Start: 2023-02-27 | End: 2023-02-27 | Stop reason: HOSPADM

## 2023-02-27 RX ORDER — SODIUM CHLORIDE, SODIUM LACTATE, POTASSIUM CHLORIDE, CALCIUM CHLORIDE 600; 310; 30; 20 MG/100ML; MG/100ML; MG/100ML; MG/100ML
125 INJECTION, SOLUTION INTRAVENOUS ONCE
Status: COMPLETED | OUTPATIENT
Start: 2023-02-27 | End: 2023-02-27

## 2023-02-27 RX ORDER — ONDANSETRON 2 MG/ML
4 INJECTION INTRAMUSCULAR; INTRAVENOUS AS NEEDED
Status: DISCONTINUED | OUTPATIENT
Start: 2023-02-27 | End: 2023-02-27 | Stop reason: HOSPADM

## 2023-02-27 RX ORDER — IPRATROPIUM BROMIDE AND ALBUTEROL SULFATE 2.5; .5 MG/3ML; MG/3ML
3 SOLUTION RESPIRATORY (INHALATION) ONCE AS NEEDED
Status: COMPLETED | OUTPATIENT
Start: 2023-02-27 | End: 2023-02-27

## 2023-02-27 RX ORDER — FENTANYL CITRATE 50 UG/ML
50 INJECTION, SOLUTION INTRAMUSCULAR; INTRAVENOUS
Status: DISCONTINUED | OUTPATIENT
Start: 2023-02-27 | End: 2023-02-27 | Stop reason: HOSPADM

## 2023-02-27 RX ORDER — ALBUTEROL SULFATE 2.5 MG/3ML
2.5 SOLUTION RESPIRATORY (INHALATION)
Status: DISCONTINUED | OUTPATIENT
Start: 2023-02-27 | End: 2023-02-27

## 2023-02-27 RX ORDER — LIDOCAINE HYDROCHLORIDE 10 MG/ML
2.5 INJECTION, SOLUTION EPIDURAL; INFILTRATION; INTRACAUDAL; PERINEURAL
Status: DISCONTINUED | OUTPATIENT
Start: 2023-02-27 | End: 2023-02-27

## 2023-02-27 RX ORDER — MEPERIDINE HYDROCHLORIDE 25 MG/ML
12.5 INJECTION INTRAMUSCULAR; INTRAVENOUS; SUBCUTANEOUS
Status: DISCONTINUED | OUTPATIENT
Start: 2023-02-27 | End: 2023-02-27 | Stop reason: HOSPADM

## 2023-02-27 RX ORDER — LIDOCAINE HYDROCHLORIDE 10 MG/ML
INJECTION, SOLUTION EPIDURAL; INFILTRATION; INTRACAUDAL; PERINEURAL AS NEEDED
Status: DISCONTINUED | OUTPATIENT
Start: 2023-02-27 | End: 2023-02-27 | Stop reason: HOSPADM

## 2023-02-27 RX ORDER — PROPOFOL 10 MG/ML
VIAL (ML) INTRAVENOUS AS NEEDED
Status: DISCONTINUED | OUTPATIENT
Start: 2023-02-27 | End: 2023-02-27 | Stop reason: SURG

## 2023-02-27 RX ORDER — SODIUM CHLORIDE 0.9 % (FLUSH) 0.9 %
10 SYRINGE (ML) INJECTION AS NEEDED
Status: DISCONTINUED | OUTPATIENT
Start: 2023-02-27 | End: 2023-02-27 | Stop reason: HOSPADM

## 2023-02-27 RX ORDER — LIDOCAINE HYDROCHLORIDE 40 MG/ML
3 INJECTION, SOLUTION RETROBULBAR; TOPICAL ONCE
Status: COMPLETED | OUTPATIENT
Start: 2023-02-27 | End: 2023-02-27

## 2023-02-27 RX ORDER — OXYCODONE HYDROCHLORIDE AND ACETAMINOPHEN 5; 325 MG/1; MG/1
1 TABLET ORAL ONCE AS NEEDED
Status: DISCONTINUED | OUTPATIENT
Start: 2023-02-27 | End: 2023-02-27 | Stop reason: HOSPADM

## 2023-02-27 RX ADMIN — IPRATROPIUM BROMIDE AND ALBUTEROL SULFATE 3 ML: .5; 2.5 SOLUTION RESPIRATORY (INHALATION) at 10:02

## 2023-02-27 RX ADMIN — PROPOFOL 110 MG: 10 INJECTION, EMULSION INTRAVENOUS at 09:18

## 2023-02-27 RX ADMIN — SODIUM CHLORIDE, POTASSIUM CHLORIDE, SODIUM LACTATE AND CALCIUM CHLORIDE: 600; 310; 30; 20 INJECTION, SOLUTION INTRAVENOUS at 09:15

## 2023-02-27 RX ADMIN — ALBUTEROL SULFATE 2.5 MG: 2.5 SOLUTION RESPIRATORY (INHALATION) at 08:20

## 2023-02-27 RX ADMIN — LIDOCAINE HYDROCHLORIDE 3 ML: 40 INJECTION, SOLUTION RETROBULBAR; TOPICAL at 08:26

## 2023-02-27 NOTE — ANESTHESIA PREPROCEDURE EVALUATION
Anesthesia Evaluation     Patient summary reviewed and Nursing notes reviewed   no history of anesthetic complications:               Airway   Mallampati: III  TM distance: >3 FB  Neck ROM: full  Possible difficult intubation  Dental - normal exam   (+) poor dentition    Pulmonary - normal exam   (+) pneumonia , COPD, sleep apnea,   Cardiovascular - normal exam  Exercise tolerance: good (4-7 METS)    NYHA Classification: II  Rhythm: regular  Rate: normal    (+) hypertension, past MI  >12 months, CAD, dysrhythmias, CHF , hyperlipidemia,       Neuro/Psych  (+) CVA (6 years ago/ no residual),    GI/Hepatic/Renal/Endo    (+)  GERD,  renal disease, diabetes mellitus,     Musculoskeletal (-) negative ROS    Abdominal   (+) obese,     Bowel sounds: normal.   Substance History - negative use     OB/GYN negative ob/gyn ROS         Other - negative ROS       ROS/Med Hx Other: Normal cardiiac cath  11/2020                    Anesthesia Plan    ASA 3     general     intravenous induction     Anesthetic plan, risks, benefits, and alternatives have been provided, discussed and informed consent has been obtained with: patient.    Use of blood products discussed with consented to blood products.   Plan discussed with CRNA.

## 2023-02-27 NOTE — ANESTHESIA PROCEDURE NOTES
Airway  Urgency: elective    Date/Time: 2/27/2023 9:19 AM  Airway not difficult    General Information and Staff    Patient location during procedure: OR  Anesthesiologist: Stu Patel MD  CRNA/CAA: Rajat Kate CRNA    Indications and Patient Condition    Preoxygenated: yes  MILS not maintained throughout  Mask difficulty assessment: 0 - not attempted    Final Airway Details  Final airway type: supraglottic airway      Successful airway: unique  Size 4     Number of attempts at approach: 1  Assessment: lips, teeth, and gum same as pre-op and atraumatic intubation    Additional Comments  Atraumatic LMA placement, dentition unchanged.

## 2023-02-27 NOTE — ANESTHESIA POSTPROCEDURE EVALUATION
Patient: Mayra Hays    Procedure Summary     Date: 02/27/23 Room / Location:  [facility]  /  [facility]    Anesthesia Start: 0915 Anesthesia Stop:     Procedure: BRONCHOSCOPY WITH ENDOBRONCHIAL ULTRASOUND (Bilateral: Bronchus) Diagnosis:       Pulmonary nodule      (Pulmonary nodule [R91.1])    Surgeons: Abdulkadir Peter MD Provider: Rajat Kate CRNA    Anesthesia Type: general ASA Status: 3          Anesthesia Type: general    Vitals  Vitals Value Taken Time   /75 02/27/23 1012   Temp 97.7 °F (36.5 °C) 02/27/23 0935   Pulse 61 02/27/23 1012   Resp 18 02/27/23 1012   SpO2 97 % 02/27/23 1012           Post Anesthesia Care and Evaluation    Patient location during evaluation: PACU  Patient participation: complete - patient participated  Level of consciousness: awake  Pain score: 0  Pain management: adequate    Airway patency: patent  Anesthetic complications: No anesthetic complications  PONV Status: none  Cardiovascular status: hemodynamically stable  Respiratory status: nasal cannula  Hydration status: acceptable

## 2023-02-28 LAB
ACID FAST STN SPEC: NEGATIVE
ACID FAST STN SPEC: NEGATIVE
B PARAPERT DNA SPEC QL NAA+PROBE: NOT DETECTED
B PERT DNA SPEC QL NAA+PROBE: NOT DETECTED
C PNEUM DNA NPH QL NAA+NON-PROBE: NOT DETECTED
FLUAV SUBTYP SPEC NAA+PROBE: NOT DETECTED
FLUBV RNA ISLT QL NAA+PROBE: NOT DETECTED
HADV DNA SPEC NAA+PROBE: NOT DETECTED
HCOV 229E RNA SPEC QL NAA+PROBE: NOT DETECTED
HCOV HKU1 RNA SPEC QL NAA+PROBE: NOT DETECTED
HCOV NL63 RNA SPEC QL NAA+PROBE: NOT DETECTED
HCOV OC43 RNA SPEC QL NAA+PROBE: NOT DETECTED
HMPV RNA NPH QL NAA+NON-PROBE: NOT DETECTED
HPIV1 RNA ISLT QL NAA+PROBE: NOT DETECTED
HPIV2 RNA SPEC QL NAA+PROBE: NOT DETECTED
HPIV3 RNA NPH QL NAA+PROBE: NOT DETECTED
HPIV4 P GENE NPH QL NAA+PROBE: NOT DETECTED
M PNEUMO IGG SER IA-ACNC: NOT DETECTED
RHINOVIRUS RNA SPEC NAA+PROBE: DETECTED
RSV RNA NPH QL NAA+NON-PROBE: NOT DETECTED
SARS-COV-2 RNA NPH QL NAA+NON-PROBE: NOT DETECTED
SPECIMEN PREPARATION: NORMAL
SPECIMEN PREPARATION: NORMAL

## 2023-03-01 DIAGNOSIS — R91.1 PULMONARY NODULE: Primary | ICD-10-CM

## 2023-03-01 LAB
BACTERIA SPEC AEROBE CULT: ABNORMAL
BACTERIA SPEC AEROBE CULT: ABNORMAL
GRAM STN SPEC: ABNORMAL
REF LAB TEST METHOD: NORMAL

## 2023-03-01 RX ORDER — AMOXICILLIN AND CLAVULANATE POTASSIUM 500; 125 MG/1; MG/1
1 TABLET, FILM COATED ORAL 2 TIMES DAILY
Qty: 14 TABLET | Refills: 0 | Status: SHIPPED | OUTPATIENT
Start: 2023-03-01

## 2023-03-03 ENCOUNTER — TELEPHONE (OUTPATIENT)
Dept: PULMONOLOGY | Facility: CLINIC | Age: 78
End: 2023-03-03
Payer: MEDICARE

## 2023-03-03 NOTE — TELEPHONE ENCOUNTER
----- Message from Abdulkadir Peter MD sent at 3/1/2023  8:11 PM EST -----  Referring to CT surgery-  nodule - bronch negative  Called in antibiotics- growing bacteria in bronch cultures    Please let patient know    Ty  ss  ----- Message -----  From: Lab, Background User  Sent: 2/28/2023  10:31 AM EST  To: Abdulkadir Peter MD

## 2023-03-29 LAB
FUNGUS SPEC CULT: NORMAL
FUNGUS SPEC FUNGUS STN: NORMAL
FUNGUS SPEC FUNGUS STN: NORMAL

## 2023-04-14 LAB
ACID FAST STN SPEC: NEGATIVE
ACID FAST STN SPEC: NEGATIVE
MYCOBACTERIUM SPEC QL CULT: NEGATIVE
MYCOBACTERIUM SPEC QL CULT: NEGATIVE
SPECIMEN PREPARATION: NORMAL
SPECIMEN PREPARATION: NORMAL

## 2023-04-21 DIAGNOSIS — J44.9 CHRONIC OBSTRUCTIVE PULMONARY DISEASE, UNSPECIFIED COPD TYPE: ICD-10-CM

## 2023-04-21 RX ORDER — IPRATROPIUM BROMIDE AND ALBUTEROL SULFATE 2.5; .5 MG/3ML; MG/3ML
SOLUTION RESPIRATORY (INHALATION)
Qty: 360 ML | Refills: 11 | Status: SHIPPED | OUTPATIENT
Start: 2023-04-21

## 2023-04-28 ENCOUNTER — OFFICE VISIT (OUTPATIENT)
Dept: CARDIOLOGY | Facility: CLINIC | Age: 78
End: 2023-04-28
Payer: MEDICARE

## 2023-04-28 VITALS
RESPIRATION RATE: 18 BRPM | DIASTOLIC BLOOD PRESSURE: 84 MMHG | HEART RATE: 64 BPM | WEIGHT: 226 LBS | OXYGEN SATURATION: 93 % | BODY MASS INDEX: 35.47 KG/M2 | HEIGHT: 67 IN | SYSTOLIC BLOOD PRESSURE: 141 MMHG

## 2023-04-28 DIAGNOSIS — I50.32 CHRONIC HEART FAILURE WITH PRESERVED EJECTION FRACTION: Primary | ICD-10-CM

## 2023-04-28 DIAGNOSIS — I48.0 PAROXYSMAL ATRIAL FIBRILLATION: ICD-10-CM

## 2023-04-28 DIAGNOSIS — I10 ESSENTIAL HYPERTENSION: ICD-10-CM

## 2023-04-28 NOTE — PROGRESS NOTES
Niki Antony APRN  Mayra Hays  1945 04/28/2023    Patient Active Problem List   Diagnosis   • Chronic heart failure with preserved ejection fraction   • Essential hypertension   • Type 2 diabetes mellitus   • Abnormal nuclear stress test with moderate to large size anteroapical and lateral wall myocardial ischemia.   • Acute renal insufficiency   • Paroxysmal atrial fibrillation   • Nocturnal hypoxia   • Ventral hernia without obstruction or gangrene   • Chronic obstructive pulmonary disease   • Urinary tract infection without hematuria   • Former smoker   • Gait instability   • Pulmonary nodule   • Chronic respiratory failure with hypoxia       Dear Niki Antony APRN:    Subjective     History of Present Illness:    Chief Complaint   Patient presents with   • Follow-up     6 month   • Chest Pain     Not active   • Numbness     L hand       Mayra Hays is a pleasant 77 y.o. female with a past medical history significant for  no known CAD with recent LHC showing normal coronary arteries.  She does have diabetes mellitus, she does not currently smoke tobacco anymore but does have 112-year pack year history where she averaged 2 to 3 packs a day for 56 years.  She also has paroxysmal atrial fibrillation anticoagulated with Eliquis, severe COPD and follows with pulmonology, essential hypertension, and dyslipidemia.  She comes in today for routine cardiology follow-up.     Mayra reports that she has been stable.  She still reports back pains and abdominal pain which has been chronic for the last couple years.  She denies any exertional chest pains or worsening shortness of breath from baseline although she does have some chronic dyspnea.  She does follow closely with pulmonology.    No Known Allergies:      Current Outpatient Medications:   •  albuterol sulfate  (90 Base) MCG/ACT inhaler, Inhale 2 puffs Every 4 (Four) Hours As Needed for Wheezing or Shortness of Air., Disp: 18 g, Rfl: 8  •   amoxicillin-clavulanate (Augmentin) 500-125 MG per tablet, Take 1 tablet by mouth 2 (Two) Times a Day., Disp: 14 tablet, Rfl: 0  •  apixaban (ELIQUIS) 5 MG tablet tablet, Take 1 tablet by mouth Every 12 (Twelve) Hours., Disp: 180 tablet, Rfl: 3  •  atorvastatin (LIPITOR) 20 MG tablet, Take 1 tablet by mouth Daily., Disp: 90 tablet, Rfl: 3  •  Budeson-Glycopyrrol-Formoterol (Breztri Aerosphere) 160-9-4.8 MCG/ACT aerosol inhaler, Inhale 2 puffs 2 (Two) Times a Day., Disp: 10.7 g, Rfl: 8  •  esomeprazole (nexIUM) 20 MG capsule, Take 1 capsule by mouth Daily., Disp: , Rfl:   •  ipratropium-albuterol (DUO-NEB) 0.5-2.5 mg/3 ml nebulizer, USE 1 VIAL IN NEBULIZER 4 TIMES DAILY - and as needed, Disp: 360 mL, Rfl: 11  •  ketorolac (TORADOL) 10 MG tablet, Take 1 tablet by mouth Every 6 (Six) Hours As Needed for Moderate Pain., Disp: 20 tablet, Rfl: 0  •  lisinopril (PRINIVIL,ZESTRIL) 5 MG tablet, Take 1 tablet by mouth Daily., Disp: 90 tablet, Rfl: 3  •  loratadine (CLARITIN) 10 MG tablet, Take 1 tablet by mouth Daily As Needed for Allergies., Disp: , Rfl:   •  metFORMIN (GLUCOPHAGE) 500 MG tablet, Take 1 tablet by mouth 2 (two) times a day., Disp: , Rfl:   •  methocarbamol (Robaxin) 500 MG tablet, Take 1 tablet by mouth 3 (Three) Times a Day As Needed for Muscle Spasms., Disp: 30 tablet, Rfl: 2  •  O2 (OXYGEN), Inhale 2 L/min As Needed., Disp: , Rfl:   •  pantoprazole (PROTONIX) 40 MG EC tablet, Take 1 tablet by mouth Daily As Needed (acid reflux)., Disp: , Rfl:   •  phenazopyridine (Pyridium) 200 MG tablet, Take 1 tablet by mouth 3 (Three) Times a Day As Needed for Bladder Spasms (stop after 3 days/use prn only)., Disp: 20 tablet, Rfl: 0  •  potassium chloride (K-DUR,KLOR-CON) 20 MEQ CR tablet, Take 1 tablet by mouth Daily., Disp: , Rfl:   •  spironolactone (ALDACTONE) 25 MG tablet, Take 1 tablet by mouth Daily., Disp: 90 tablet, Rfl: 3  •  Stool Softener 100 MG capsule, Take 1 capsule by mouth 2 (two) times a day., Disp: ,  "Rfl:   •  topiramate (TOPAMAX) 25 MG tablet, Take 1 tablet by mouth 2 (Two) Times a Day., Disp: , Rfl:   •  trimethoprim (TRIMPEX) 100 MG tablet, Take 1 tablet by mouth 2 (Two) Times a Day., Disp: 30 tablet, Rfl: 1    The following portions of the patient's history were reviewed and updated as appropriate: allergies, current medications, past family history, past medical history, past social history, past surgical history and problem list.    Social History     Tobacco Use   • Smoking status: Former     Packs/day: 3.00     Years: 55.00     Pack years: 165.00     Types: Cigarettes     Start date: 4/3/1998     Quit date: 2015     Years since quittin.3   • Smokeless tobacco: Never   Vaping Use   • Vaping Use: Never used   Substance Use Topics   • Alcohol use: No   • Drug use: No         Objective   Vitals:    23 1002   BP: 141/84   BP Location: Right arm   Patient Position: Sitting   Cuff Size: Adult   Pulse: 64   Resp: 18   SpO2: 93%   Weight: 103 kg (226 lb)   Height: 170.2 cm (67\")     Body mass index is 35.4 kg/m².    Constitutional:       General: Not in acute distress.     Appearance: Healthy appearance. Well-developed and not in distress. Not diaphoretic.   Eyes:      Conjunctiva/sclera: Conjunctivae normal.      Pupils: Pupils are equal, round, and reactive to light.   HENT:      Head: Normocephalic and atraumatic.   Neck:      Vascular: No carotid bruit or JVD.   Pulmonary:      Effort: Pulmonary effort is normal. No respiratory distress.      Breath sounds: Normal breath sounds.   Cardiovascular:      Normal rate. Regular rhythm.   Skin:     General: Skin is cool.   Neurological:      Mental Status: Alert, oriented to person, place, and time and oriented to person, place and time.         Lab Results   Component Value Date     2023    K 5.0 2023     2023    CO2 25.5 2023    BUN 15 2023    CREATININE 1.01 (H) 2023    GLUCOSE 113 (H) 2023    " CALCIUM 9.4 02/27/2023    AST 18 02/27/2023    ALT 11 02/27/2023    ALKPHOS 111 02/27/2023     Lab Results   Component Value Date    CKTOTAL 14 (L) 06/25/2018     Lab Results   Component Value Date    WBC 6.97 02/27/2023    HGB 12.6 02/27/2023    HCT 39.8 02/27/2023     02/27/2023     Lab Results   Component Value Date    INR 0.91 02/27/2023    INR 1.00 05/15/2020    INR 0.99 05/14/2020     Lab Results   Component Value Date    MG 2.3 05/18/2020     Lab Results   Component Value Date    TSH 2.670 12/18/2019    TRIG 106 07/28/2022    HDL 52 07/28/2022    LDL 96 07/28/2022      Lab Results   Component Value Date    BNP 29.0 02/01/2019       During this visit the following were done:  Labs Reviewed []    Labs Ordered []    Radiology Reports Reviewed []    Radiology Ordered []    PCP Records Reviewed []    Referring Provider Records Reviewed []    ER Records Reviewed []    Hospital Records Reviewed []    History Obtained From Family []    Radiology Images Reviewed []    Other Reviewed []    Records Requested []         ECG 12 Lead    Date/Time: 4/28/2023 10:03 AM  Performed by: Darek Nelson PA-C  Authorized by: Darek Nelson PA-C   Comparison: compared with previous ECG from 7/28/2022  Rhythm: sinus rhythm  Conduction: non-specific intraventricular conduction delay    Clinical impression: non-specific ECG            Assessment & Plan    Diagnosis Plan   1. Chronic heart failure with preserved ejection fraction        2. Essential hypertension        3. Paroxysmal atrial fibrillation                 Recommendations:  1. Chronic HFpEF  a. Clinically stable no signs of acute heart failure.  2. Paroxysmal atrial fibrillation  a. Maintaining sinus rhythm continue Eliquis for anticoagulation she denies any bleeding issues.  3. Back pain/abdominal pain  a. Chronic for several years left heart catheterization in 2021 showing normal coronary arteries encouraged to continue following PCP for noncardiac  work-up.    No follow-ups on file.    As always, I appreciate very much the opportunity to participate in the cardiovascular care of your patients.      With Best Regards,    Darek Nelson PA-C

## 2023-05-10 ENCOUNTER — OFFICE VISIT (OUTPATIENT)
Dept: CARDIAC SURGERY | Facility: CLINIC | Age: 78
End: 2023-05-10
Payer: MEDICARE

## 2023-05-10 VITALS
SYSTOLIC BLOOD PRESSURE: 152 MMHG | OXYGEN SATURATION: 97 % | TEMPERATURE: 97.3 F | DIASTOLIC BLOOD PRESSURE: 88 MMHG | WEIGHT: 224.2 LBS | HEIGHT: 67 IN | HEART RATE: 75 BPM | BODY MASS INDEX: 35.19 KG/M2

## 2023-05-10 DIAGNOSIS — R91.1 PULMONARY NODULE: Primary | ICD-10-CM

## 2023-05-10 PROCEDURE — 99204 OFFICE O/P NEW MOD 45 MIN: CPT | Performed by: THORACIC SURGERY (CARDIOTHORACIC VASCULAR SURGERY)

## 2023-05-10 PROCEDURE — 1160F RVW MEDS BY RX/DR IN RCRD: CPT | Performed by: THORACIC SURGERY (CARDIOTHORACIC VASCULAR SURGERY)

## 2023-05-10 PROCEDURE — 3079F DIAST BP 80-89 MM HG: CPT | Performed by: THORACIC SURGERY (CARDIOTHORACIC VASCULAR SURGERY)

## 2023-05-10 PROCEDURE — 1159F MED LIST DOCD IN RCRD: CPT | Performed by: THORACIC SURGERY (CARDIOTHORACIC VASCULAR SURGERY)

## 2023-05-10 PROCEDURE — 3077F SYST BP >= 140 MM HG: CPT | Performed by: THORACIC SURGERY (CARDIOTHORACIC VASCULAR SURGERY)

## 2023-05-10 NOTE — PROGRESS NOTES
05/10/2023  Patient Information  Mayra Hays                                                                                          [address] [place] [id number]   1945  'PCP/Referring Physician'  Niki Antony, ERASTO  [phone]  Abdulkadir Peter MD  [phone]  Chief Complaint   Patient presents with   • Consult     Dr. Peter (pulmonology) for RUL lung nodule - first discovered by chest xray a couple years ago.  The area has increased in size.         History of Present Illness: 77-year-old  female with a history of hypertension, hyperlipidemia, diabetes mellitus, coronary artery disease, congestive heart failure, COPD (oxygen as needed), stroke and previous tobacco abuse who presents with a right upper lobe lung nodule.  The patient has a chronic cough with white sputum production.  She denies hemoptysis, weight loss, lymphadenopathy, fevers or chills.  The patient does have chronic shortness of breath with ambulation.  She notes pain in the bilateral upper extremities.      Patient Active Problem List   Diagnosis   • Chronic heart failure with preserved ejection fraction   • Essential hypertension   • Type 2 diabetes mellitus   • Abnormal nuclear stress test with moderate to large size anteroapical and lateral wall myocardial ischemia.   • Acute renal insufficiency   • Paroxysmal atrial fibrillation   • Nocturnal hypoxia   • Ventral hernia without obstruction or gangrene   • Chronic obstructive pulmonary disease   • Urinary tract infection without hematuria   • Former smoker   • Gait instability   • Pulmonary nodule   • Chronic respiratory failure with hypoxia     Past Medical History:   Diagnosis Date   • Abnormal ECG    • Arthritis    • CHF (congestive heart failure)    • Chronic kidney disease    • Collapsed lung    • Congenital heart disease     pt unsure of this   • COPD (chronic obstructive pulmonary disease)    • Coronary artery disease    • Diabetes mellitus     TYPE 2   •  Elevated cholesterol    • GERD (gastroesophageal reflux disease)    • Heart valve disease    • Hyperlipidemia    • Hypertension    • Myocardial infarction 2019   • Sleep apnea     non compliant with CPAP   • Stroke 2019   • UTI (urinary tract infection)      Past Surgical History:   Procedure Laterality Date   • ABDOMINAL SURGERY     • BRONCHOSCOPY Bilateral 02/27/2023    Procedure: BRONCHOSCOPY WITH ENDOBRONCHIAL ULTRASOUND;  Surgeon: Abdulkadir Peter MD;  Location:  [facility];  Service: Pulmonary;  Laterality: Bilateral;   • CARDIAC CATHETERIZATION N/A 08/22/2017    Procedure: Left Heart Cath;  Surgeon: Jatinder Matias MD;  Location:  [facility];  Service:    • CARDIAC CATHETERIZATION N/A 11/22/2021    Procedure: Left Heart Cath;  Surgeon: Tolu Steinberg MD;  Location:  [facility];  Service: Cardiology;  Laterality: N/A;   • CHOLECYSTECTOMY     • COLONOSCOPY     • ENDOSCOPY     • GALLBLADDER SURGERY     • VENTRAL HERNIA REPAIR N/A 05/14/2020    Procedure: VENTRAL HERNIA REPAIR LAPAROSCOPIC WITH DAVINCI ROBOT;  Surgeon: Petr Velásquez MD;  Location:  [facility];  Service: DaVinci;  Laterality: N/A;       Current Outpatient Medications:   •  albuterol sulfate  (90 Base) MCG/ACT inhaler, Inhale 2 puffs Every 4 (Four) Hours As Needed for Wheezing or Shortness of Air., Disp: 18 g, Rfl: 8  •  apixaban (ELIQUIS) 5 MG tablet tablet, Take 1 tablet by mouth Every 12 (Twelve) Hours., Disp: 180 tablet, Rfl: 3  •  atorvastatin (LIPITOR) 20 MG tablet, Take 1 tablet by mouth Daily., Disp: 90 tablet, Rfl: 3  •  Budeson-Glycopyrrol-Formoterol (Breztri Aerosphere) 160-9-4.8 MCG/ACT aerosol inhaler, Inhale 2 puffs 2 (Two) Times a Day., Disp: 10.7 g, Rfl: 8  •  esomeprazole (nexIUM) 20 MG capsule, Take 1 capsule by mouth Daily., Disp: , Rfl:   •  ipratropium-albuterol (DUO-NEB) 0.5-2.5 mg/3 ml nebulizer, USE 1 VIAL IN NEBULIZER 4 TIMES DAILY - and as needed, Disp: 360 mL, Rfl: 11  •   ketorolac (TORADOL) 10 MG tablet, Take 1 tablet by mouth Every 6 (Six) Hours As Needed for Moderate Pain., Disp: 20 tablet, Rfl: 0  •  lisinopril (PRINIVIL,ZESTRIL) 5 MG tablet, Take 1 tablet by mouth Daily., Disp: 90 tablet, Rfl: 3  •  loratadine (CLARITIN) 10 MG tablet, Take 1 tablet by mouth Daily As Needed for Allergies., Disp: , Rfl:   •  metFORMIN (GLUCOPHAGE) 500 MG tablet, Take 1 tablet by mouth 2 (two) times a day., Disp: , Rfl:   •  methocarbamol (Robaxin) 500 MG tablet, Take 1 tablet by mouth 3 (Three) Times a Day As Needed for Muscle Spasms., Disp: 30 tablet, Rfl: 2  •  O2 (OXYGEN), Inhale 2 L/min As Needed., Disp: , Rfl:   •  pantoprazole (PROTONIX) 40 MG EC tablet, Take 1 tablet by mouth Daily As Needed (acid reflux)., Disp: , Rfl:   •  phenazopyridine (Pyridium) 200 MG tablet, Take 1 tablet by mouth 3 (Three) Times a Day As Needed for Bladder Spasms (stop after 3 days/use prn only)., Disp: 20 tablet, Rfl: 0  •  potassium chloride (K-DUR,KLOR-CON) 20 MEQ CR tablet, Take 1 tablet by mouth Daily., Disp: , Rfl:   •  spironolactone (ALDACTONE) 25 MG tablet, Take 1 tablet by mouth Daily., Disp: 90 tablet, Rfl: 3  •  Stool Softener 100 MG capsule, Take 1 capsule by mouth 2 (two) times a day., Disp: , Rfl:   •  topiramate (TOPAMAX) 25 MG tablet, Take 1 tablet by mouth 2 (Two) Times a Day., Disp: , Rfl:   •  trimethoprim (TRIMPEX) 100 MG tablet, Take 1 tablet by mouth 2 (Two) Times a Day., Disp: 30 tablet, Rfl: 1  •  amoxicillin-clavulanate (Augmentin) 500-125 MG per tablet, Take 1 tablet by mouth 2 (Two) Times a Day. (Patient not taking: Reported on 5/10/2023), Disp: 14 tablet, Rfl: 0  No Known Allergies  Social History     Socioeconomic History   • Marital status: Single   • Number of children: 6   Tobacco Use   • Smoking status: Former     Packs/day: 3.00     Years: 55.00     Pack years: 165.00     Types: Cigarettes     Start date: 4/3/1998     Quit date: 2015     Years since quittin.3   • Smokeless  tobacco: Never   Vaping Use   • Vaping Use: Never used   Substance and Sexual Activity   • Alcohol use: No   • Drug use: No   • Sexual activity: Never     Family History   Problem Relation Age of Onset   • Heart disease Mother         Rhianna cortes   • Heart disease Father    • Heart attack Father    • Heart failure Father      Review of Systems   Constitutional: Negative. Negative for chills, decreased appetite, diaphoresis, fever, malaise/fatigue, night sweats, weight gain and weight loss.   HENT: Positive for congestion. Negative for hoarse voice.    Eyes: Negative for blurred vision, double vision and visual disturbance.   Cardiovascular: Positive for chest pain (tight sensation - germaine on right side) and dyspnea on exertion. Negative for claudication, irregular heartbeat, leg swelling, near-syncope, orthopnea, palpitations, paroxysmal nocturnal dyspnea and syncope.   Respiratory: Positive for cough, shortness of breath and sputum production. Negative for hemoptysis and wheezing.    Hematologic/Lymphatic: Negative for adenopathy and bleeding problem. Does not bruise/bleed easily.   Skin: Negative for color change, nail changes, poor wound healing and rash.   Musculoskeletal: Positive for joint pain. Negative for back pain, falls and muscle cramps.   Gastrointestinal: Negative.  Negative for abdominal pain, dysphagia and heartburn.   Genitourinary: Positive for dysuria (recurrent UTIs). Negative for flank pain.   Neurological: Positive for dizziness and loss of balance. Negative for brief paralysis, disturbances in coordination, focal weakness, headaches, light-headedness, numbness, paresthesias, sensory change, vertigo and weakness.   Psychiatric/Behavioral: Negative for depression and suicidal ideas. The patient is nervous/anxious.    Allergic/Immunologic: Negative for persistent infections.     Vitals:    05/10/23 1233 05/10/23 1234   BP: 158/90 152/88   BP Location: Left arm Right arm   Patient Position: Sitting  "Sitting   Pulse: 75    Temp: 97.3 °F (36.3 °C)    SpO2: 97%    Weight: 102 kg (224 lb 3.2 oz)    Height: 170.2 cm (67.01\")       Physical Exam  Vitals reviewed.   Constitutional:       General: She is not in acute distress.     Appearance: Normal appearance.      Comments:  female who appears stated age and is present with her daughter Hialey.   HENT:      Head: Normocephalic and atraumatic.      Nose: Nose normal.   Eyes:      General: No scleral icterus.  Cardiovascular:      Rate and Rhythm: Normal rate and regular rhythm.      Heart sounds: No murmur heard.    No friction rub. No gallop.   Pulmonary:      Effort: Pulmonary effort is normal. No respiratory distress.      Breath sounds: Normal breath sounds. No rhonchi.   Abdominal:      General: There is no distension.      Palpations: Abdomen is soft. There is no mass.      Tenderness: There is no abdominal tenderness. There is no guarding or rebound.   Musculoskeletal:         General: No deformity.      Cervical back: Neck supple.      Right lower leg: No edema.      Left lower leg: No edema.   Lymphadenopathy:      Cervical: No cervical adenopathy.      Upper Body:      Right upper body: No supraclavicular or axillary adenopathy.      Left upper body: No supraclavicular or axillary adenopathy.   Skin:     General: Skin is warm and dry.      Coloration: Skin is not jaundiced.   Neurological:      Mental Status: She is alert and oriented to person, place, and time.      Gait: Gait normal.   Psychiatric:         Mood and Affect: Mood normal.         Behavior: Behavior normal.         Thought Content: Thought content normal.         Judgment: Judgment normal.         The ROS, past medical history, surgical history, family history, social history and vitals were reviewed by myself and corrected as needed.      Labs/Imaging:  -Bronchoscopy/EBUS performed 2/27/2023, lavage brushings and washings of the right upper lobe negative for malignancy.  FNA of station " 10 R- for malignancy.  -CT of the chest performed 2/14/2023, personally reviewed, demonstrates a 1.9 cm spiculated right upper lobe lung nodule.  There is a 1.6 cm pretracheal lymph node present and adjacent satellite nodule in the right upper lobe.    Assessment/Plan:  77-year-old  female with a history of hypertension, hyperlipidemia, diabetes mellitus, coronary artery disease, congestive heart failure, COPD (oxygen as needed), stroke and previous tobacco abuse who presents with a right upper lobe lung nodule.  I discussed with the patient obtaining a CT-guided needle biopsy of this right upper lobe lung nodule to establish a diagnosis.  A PET scan will be obtained to ensure no additional sites of disease.  If her biopsies are consistent with malignancy, the patient is a marginal surgical candidate given her chronic shortness of breath.  She would be a likely candidate for radiation treatment.  I will have the patient return to clinic after her biopsy and PET scan to discuss these results.    Patient Active Problem List   Diagnosis   • Chronic heart failure with preserved ejection fraction   • Essential hypertension   • Type 2 diabetes mellitus   • Abnormal nuclear stress test with moderate to large size anteroapical and lateral wall myocardial ischemia.   • Acute renal insufficiency   • Paroxysmal atrial fibrillation   • Nocturnal hypoxia   • Ventral hernia without obstruction or gangrene   • Chronic obstructive pulmonary disease   • Urinary tract infection without hematuria   • Former smoker   • Gait instability   • Pulmonary nodule   • Chronic respiratory failure with hypoxia

## 2023-05-11 DIAGNOSIS — R91.1 PULMONARY NODULE: Primary | ICD-10-CM

## 2023-05-17 NOTE — PROGRESS NOTES
Discharge Planning Assessment   [place]     Patient Name: Mayra Hays  MRN: [id number]  Today's Date: 6/15/2018    Admit Date: 6/12/2018      Discharge Plan     Row Name 06/15/18 1331       Plan    Plan SS spoke to pt on this date. Pt lives at home alone, however stays with her daughter at times and plans to return home at discharge. Pt does not utilize home health services and denies need for services at this time. Pt has home oxygen @ 2 liters that she uses PRN, nebulizer machine, glucometer, quad cane, and rolling walker. Pt is requesting a wheelchair. W/C to be arranged with dr. hamilton. SS to follow.           [facility]     Information: Selecting Yes will display possible errors in your note based on the variables you have selected. This validation is only offered as a suggestion for you. PLEASE NOTE THAT THE VALIDATION TEXT WILL BE REMOVED WHEN YOU FINALIZE YOUR NOTE. IF YOU WANT TO FAX A PRELIMINARY NOTE YOU WILL NEED TO TOGGLE THIS TO 'NO' IF YOU DO NOT WANT IT IN YOUR FAXED NOTE.

## 2023-06-08 ENCOUNTER — TELEPHONE (OUTPATIENT)
Dept: INFUSION THERAPY | Facility: HOSPITAL | Age: 78
End: 2023-06-08
Payer: MEDICARE

## 2023-06-08 NOTE — TELEPHONE ENCOUNTER
Pre procedure call for lung biopsy 6-13-23. Daughter to call Cardiologist and see if [facility] can be held for the required two days prior to procedure. NPO, directions and arrival time, and medications to take the morning of the procedure discussed. Also need for

## 2023-07-12 PROBLEM — N28.9 ACUTE RENAL INSUFFICIENCY: Status: RESOLVED | Noted: 2018-06-24 | Resolved: 2023-07-12

## 2023-07-12 PROBLEM — N39.0 URINARY TRACT INFECTION WITHOUT HEMATURIA: Status: RESOLVED | Noted: 2021-05-05 | Resolved: 2023-07-12

## 2023-07-21 ENCOUNTER — PREP FOR SURGERY (OUTPATIENT)
Dept: OTHER | Facility: HOSPITAL | Age: 78
End: 2023-07-21
Payer: MEDICARE

## 2023-07-21 DIAGNOSIS — R91.1 PULMONARY NODULE: Primary | ICD-10-CM

## 2023-07-21 PROBLEM — R91.8 PULMONARY NODULES: Status: ACTIVE | Noted: 2022-02-21

## 2023-07-21 PROBLEM — F17.211 CIGARETTE NICOTINE DEPENDENCE IN REMISSION: Status: ACTIVE | Noted: 2023-07-21

## 2023-07-21 RX ORDER — LIDOCAINE HYDROCHLORIDE 40 MG/ML
4 INJECTION, SOLUTION RETROBULBAR; TOPICAL ONCE
OUTPATIENT
Start: 2023-07-21 | End: 2023-07-21

## 2023-08-09 ENCOUNTER — HOSPITAL ENCOUNTER (OUTPATIENT)
Dept: RESPIRATORY THERAPY | Facility: HOSPITAL | Age: 78
Discharge: HOME | End: 2023-08-09
Payer: MEDICARE

## 2023-08-09 ENCOUNTER — HOSPITAL ENCOUNTER (OUTPATIENT)
Dept: CT IMAGING | Facility: HOSPITAL | Age: 78
Discharge: HOME | End: 2023-08-09
Payer: MEDICARE

## 2023-08-09 ENCOUNTER — LAB (OUTPATIENT)
Dept: LAB | Facility: HOSPITAL | Age: 78
End: 2023-08-09
Payer: MEDICARE

## 2023-08-09 DIAGNOSIS — R91.1 PULMONARY NODULE: ICD-10-CM

## 2023-08-09 LAB
APTT PPP: 35 SECONDS (ref 26.5–34.5)
INR PPP: 1.4 (ref 0.9–1.1)
PROTHROMBIN TIME: 17.7 SECONDS (ref 12.1–14.7)
QT INTERVAL: 428 MS
QTC INTERVAL: 458 MS

## 2023-08-09 PROCEDURE — 85610 PROTHROMBIN TIME: CPT

## 2023-08-09 PROCEDURE — 71250 CT THORAX DX C-: CPT

## 2023-08-09 PROCEDURE — 85730 THROMBOPLASTIN TIME PARTIAL: CPT

## 2023-08-09 PROCEDURE — 36415 COLL VENOUS BLD VENIPUNCTURE: CPT

## 2023-08-09 PROCEDURE — 85025 COMPLETE CBC W/AUTO DIFF WBC: CPT

## 2023-08-09 PROCEDURE — 93005 ELECTROCARDIOGRAM TRACING: CPT | Performed by: INTERNAL MEDICINE

## 2023-08-09 PROCEDURE — 80053 COMPREHEN METABOLIC PANEL: CPT

## 2023-08-09 PROCEDURE — 71250 CT THORAX DX C-: CPT | Performed by: RADIOLOGY

## 2023-08-10 ENCOUNTER — DOCUMENTATION (OUTPATIENT)
Dept: PULMONOLOGY | Facility: CLINIC | Age: 78
End: 2023-08-10
Payer: MEDICARE

## 2023-08-10 LAB
ALBUMIN SERPL-MCNC: 3.8 G/DL (ref 3.5–5.2)
ALBUMIN/GLOB SERPL: 1.2 G/DL
ALP SERPL-CCNC: 115 U/L (ref 39–117)
ALT SERPL W P-5'-P-CCNC: 13 U/L (ref 1–33)
ANION GAP SERPL CALCULATED.3IONS-SCNC: 10.9 MMOL/L (ref 5–15)
AST SERPL-CCNC: 18 U/L (ref 1–32)
BASOPHILS # BLD AUTO: 0.04 10*3/MM3 (ref 0–0.2)
BASOPHILS NFR BLD AUTO: 0.5 % (ref 0–1.5)
BILIRUB SERPL-MCNC: 0.2 MG/DL (ref 0–1.2)
BUN SERPL-MCNC: 13 MG/DL (ref 8–23)
BUN/CREAT SERPL: 11.4 (ref 7–25)
CALCIUM SPEC-SCNC: 10 MG/DL (ref 8.6–10.5)
CHLORIDE SERPL-SCNC: 103 MMOL/L (ref 98–107)
CO2 SERPL-SCNC: 26.1 MMOL/L (ref 22–29)
CREAT SERPL-MCNC: 1.14 MG/DL (ref 0.57–1)
DEPRECATED RDW RBC AUTO: 46 FL (ref 37–54)
EGFRCR SERPLBLD CKD-EPI 2021: 49.7 ML/MIN/1.73
EOSINOPHIL # BLD AUTO: 0.45 10*3/MM3 (ref 0–0.4)
EOSINOPHIL NFR BLD AUTO: 6 % (ref 0.3–6.2)
ERYTHROCYTE [DISTWIDTH] IN BLOOD BY AUTOMATED COUNT: 15.6 % (ref 12.3–15.4)
GLOBULIN UR ELPH-MCNC: 3.2 GM/DL
GLUCOSE SERPL-MCNC: 154 MG/DL (ref 65–99)
HCT VFR BLD AUTO: 20.7 % (ref 34–46.6)
HGB BLD-MCNC: 6.5 G/DL (ref 12–15.9)
IMM GRANULOCYTES # BLD AUTO: 0.14 10*3/MM3 (ref 0–0.05)
IMM GRANULOCYTES NFR BLD AUTO: 1.9 % (ref 0–0.5)
LYMPHOCYTES # BLD AUTO: 1.7 10*3/MM3 (ref 0.7–3.1)
LYMPHOCYTES NFR BLD AUTO: 22.5 % (ref 19.6–45.3)
MCH RBC QN AUTO: 26.2 PG (ref 26.6–33)
MCHC RBC AUTO-ENTMCNC: 31.4 G/DL (ref 31.5–35.7)
MCV RBC AUTO: 83.5 FL (ref 79–97)
MONOCYTES # BLD AUTO: 0.51 10*3/MM3 (ref 0.1–0.9)
MONOCYTES NFR BLD AUTO: 6.8 % (ref 5–12)
NEUTROPHILS NFR BLD AUTO: 4.7 10*3/MM3 (ref 1.7–7)
NEUTROPHILS NFR BLD AUTO: 62.3 % (ref 42.7–76)
NRBC BLD AUTO-RTO: 0 /100 WBC (ref 0–0.2)
PLATELET # BLD AUTO: 292 10*3/MM3 (ref 140–450)
PMV BLD AUTO: 11.3 FL (ref 6–12)
POTASSIUM SERPL-SCNC: 4.2 MMOL/L (ref 3.5–5.2)
PROT SERPL-MCNC: 7 G/DL (ref 6–8.5)
RBC # BLD AUTO: 2.48 10*6/MM3 (ref 3.77–5.28)
SODIUM SERPL-SCNC: 140 MMOL/L (ref 136–145)
WBC NRBC COR # BLD: 7.54 10*3/MM3 (ref 3.4–10.8)

## 2023-08-10 NOTE — PROGRESS NOTES
Patient underwent lab work on 8/9/2023 which I believe was PAT lab work and her hemoglobin came back at 6.5.  It was 10.7 in June 2023 and 12.6 in February 2023    I tried to call the patient on the phone numbers in the computer.  There really is only 1 working phone number and I just got voicemail.  I called it multiple times.  I asked him to call me urgently at the office so that we can get the hemoglobin rechecked and if it really is that low she probably needs to be admitted for evaluation for possible GI bleed

## 2023-08-11 ENCOUNTER — HOSPITAL ENCOUNTER (EMERGENCY)
Facility: HOSPITAL | Age: 78
Discharge: HOME | End: 2023-08-12
Attending: STUDENT IN AN ORGANIZED HEALTH CARE EDUCATION/TRAINING PROGRAM
Payer: MEDICARE

## 2023-08-11 ENCOUNTER — DOCUMENTATION (OUTPATIENT)
Dept: PULMONOLOGY | Facility: CLINIC | Age: 78
End: 2023-08-11
Payer: MEDICARE

## 2023-08-11 ENCOUNTER — LAB (OUTPATIENT)
Dept: LAB | Facility: HOSPITAL | Age: 78
End: 2023-08-11
Payer: MEDICARE

## 2023-08-11 DIAGNOSIS — R91.1 PULMONARY NODULE: Primary | ICD-10-CM

## 2023-08-11 DIAGNOSIS — D64.9 LOW HEMOGLOBIN: Primary | ICD-10-CM

## 2023-08-11 DIAGNOSIS — R91.1 PULMONARY NODULE: ICD-10-CM

## 2023-08-11 DIAGNOSIS — R89.9 ABNORMAL LABORATORY TEST: Primary | ICD-10-CM

## 2023-08-11 LAB
ABO GROUP BLD: NORMAL
ALBUMIN SERPL-MCNC: 4.1 G/DL (ref 3.5–5.2)
ALBUMIN/GLOB SERPL: 1.3 G/DL
ALP SERPL-CCNC: 131 U/L (ref 39–117)
ALT SERPL W P-5'-P-CCNC: 10 U/L (ref 1–33)
ANION GAP SERPL CALCULATED.3IONS-SCNC: 11.1 MMOL/L (ref 5–15)
ANISOCYTOSIS BLD QL: NORMAL
ANISOCYTOSIS BLD QL: NORMAL
AST SERPL-CCNC: 19 U/L (ref 1–32)
BACTERIA UR QL AUTO: ABNORMAL /HPF
BASOPHILS # BLD AUTO: 0.04 10*3/MM3 (ref 0–0.2)
BASOPHILS # BLD AUTO: 0.07 10*3/MM3 (ref 0–0.2)
BASOPHILS NFR BLD AUTO: 0.6 % (ref 0–1.5)
BASOPHILS NFR BLD AUTO: 0.9 % (ref 0–1.5)
BILIRUB SERPL-MCNC: 0.2 MG/DL (ref 0–1.2)
BILIRUB UR QL STRIP: NEGATIVE
BLD GP AB SCN SERPL QL: NEGATIVE
BUN SERPL-MCNC: 11 MG/DL (ref 8–23)
BUN/CREAT SERPL: 10.4 (ref 7–25)
CALCIUM SPEC-SCNC: 9.2 MG/DL (ref 8.6–10.5)
CHLORIDE SERPL-SCNC: 105 MMOL/L (ref 98–107)
CLARITY UR: CLEAR
CO2 SERPL-SCNC: 26.9 MMOL/L (ref 22–29)
COLOR UR: YELLOW
CREAT SERPL-MCNC: 1.06 MG/DL (ref 0.57–1)
DACRYOCYTES BLD QL SMEAR: NORMAL
DEPRECATED RDW RBC AUTO: 61.1 FL (ref 37–54)
DEPRECATED RDW RBC AUTO: 61.4 FL (ref 37–54)
EGFRCR SERPLBLD CKD-EPI 2021: 54.2 ML/MIN/1.73
EOSINOPHIL # BLD AUTO: 0.47 10*3/MM3 (ref 0–0.4)
EOSINOPHIL # BLD AUTO: 0.49 10*3/MM3 (ref 0–0.4)
EOSINOPHIL NFR BLD AUTO: 6.6 % (ref 0.3–6.2)
EOSINOPHIL NFR BLD AUTO: 7.5 % (ref 0.3–6.2)
ERYTHROCYTE [DISTWIDTH] IN BLOOD BY AUTOMATED COUNT: 18.8 % (ref 12.3–15.4)
ERYTHROCYTE [DISTWIDTH] IN BLOOD BY AUTOMATED COUNT: 18.9 % (ref 12.3–15.4)
GLOBULIN UR ELPH-MCNC: 3.1 GM/DL
GLUCOSE SERPL-MCNC: 146 MG/DL (ref 65–99)
GLUCOSE UR STRIP-MCNC: NEGATIVE MG/DL
HCT VFR BLD AUTO: 23.1 % (ref 34–46.6)
HCT VFR BLD AUTO: 23.4 % (ref 34–46.6)
HGB BLD-MCNC: 6.5 G/DL (ref 12–15.9)
HGB BLD-MCNC: 6.6 G/DL (ref 12–15.9)
HGB UR QL STRIP.AUTO: NEGATIVE
HOLD SPECIMEN: NORMAL
HOLD SPECIMEN: NORMAL
HYALINE CASTS UR QL AUTO: ABNORMAL /LPF
HYPOCHROMIA BLD QL: NORMAL
HYPOCHROMIA BLD QL: NORMAL
IMM GRANULOCYTES # BLD AUTO: 0.07 10*3/MM3 (ref 0–0.05)
IMM GRANULOCYTES # BLD AUTO: 0.09 10*3/MM3 (ref 0–0.05)
IMM GRANULOCYTES NFR BLD AUTO: 0.9 % (ref 0–0.5)
IMM GRANULOCYTES NFR BLD AUTO: 1.4 % (ref 0–0.5)
KETONES UR QL STRIP: NEGATIVE
LEUKOCYTE ESTERASE UR QL STRIP.AUTO: ABNORMAL
LYMPHOCYTES # BLD AUTO: 1.59 10*3/MM3 (ref 0.7–3.1)
LYMPHOCYTES # BLD AUTO: 1.81 10*3/MM3 (ref 0.7–3.1)
LYMPHOCYTES NFR BLD AUTO: 24.2 % (ref 19.6–45.3)
LYMPHOCYTES NFR BLD AUTO: 25.5 % (ref 19.6–45.3)
MCH RBC QN AUTO: 25.9 PG (ref 26.6–33)
MCH RBC QN AUTO: 25.9 PG (ref 26.6–33)
MCHC RBC AUTO-ENTMCNC: 28.1 G/DL (ref 31.5–35.7)
MCHC RBC AUTO-ENTMCNC: 28.2 G/DL (ref 31.5–35.7)
MCV RBC AUTO: 91.8 FL (ref 79–97)
MCV RBC AUTO: 92 FL (ref 79–97)
MONOCYTES # BLD AUTO: 0.46 10*3/MM3 (ref 0.1–0.9)
MONOCYTES # BLD AUTO: 0.51 10*3/MM3 (ref 0.1–0.9)
MONOCYTES NFR BLD AUTO: 6.8 % (ref 5–12)
MONOCYTES NFR BLD AUTO: 7.4 % (ref 5–12)
NEUTROPHILS NFR BLD AUTO: 3.58 10*3/MM3 (ref 1.7–7)
NEUTROPHILS NFR BLD AUTO: 4.53 10*3/MM3 (ref 1.7–7)
NEUTROPHILS NFR BLD AUTO: 57.6 % (ref 42.7–76)
NEUTROPHILS NFR BLD AUTO: 60.6 % (ref 42.7–76)
NITRITE UR QL STRIP: NEGATIVE
NRBC BLD AUTO-RTO: 0 /100 WBC (ref 0–0.2)
NRBC BLD AUTO-RTO: 0 /100 WBC (ref 0–0.2)
PH UR STRIP.AUTO: 7 [PH] (ref 5–8)
PLAT MORPH BLD: NORMAL
PLAT MORPH BLD: NORMAL
PLATELET # BLD AUTO: 286 10*3/MM3 (ref 140–450)
PLATELET # BLD AUTO: 303 10*3/MM3 (ref 140–450)
PMV BLD AUTO: 10.5 FL (ref 6–12)
PMV BLD AUTO: 11 FL (ref 6–12)
POTASSIUM SERPL-SCNC: 4.3 MMOL/L (ref 3.5–5.2)
PROT SERPL-MCNC: 7.2 G/DL (ref 6–8.5)
PROT UR QL STRIP: NEGATIVE
RBC # BLD AUTO: 2.51 10*6/MM3 (ref 3.77–5.28)
RBC # BLD AUTO: 2.55 10*6/MM3 (ref 3.77–5.28)
RBC # UR STRIP: ABNORMAL /HPF
REF LAB TEST METHOD: ABNORMAL
RH BLD: POSITIVE
SODIUM SERPL-SCNC: 143 MMOL/L (ref 136–145)
SP GR UR STRIP: 1.01 (ref 1–1.03)
SQUAMOUS #/AREA URNS HPF: ABNORMAL /HPF
T&S EXPIRATION DATE: NORMAL
UROBILINOGEN UR QL STRIP: ABNORMAL
WBC # UR STRIP: ABNORMAL /HPF
WBC NRBC COR # BLD: 6.23 10*3/MM3 (ref 3.4–10.8)
WBC NRBC COR # BLD: 7.48 10*3/MM3 (ref 3.4–10.8)
WHOLE BLOOD HOLD COAG: NORMAL
WHOLE BLOOD HOLD SPECIMEN: NORMAL

## 2023-08-11 PROCEDURE — 36415 COLL VENOUS BLD VENIPUNCTURE: CPT

## 2023-08-11 PROCEDURE — 86850 RBC ANTIBODY SCREEN: CPT | Performed by: PHYSICIAN ASSISTANT

## 2023-08-11 PROCEDURE — 86923 COMPATIBILITY TEST ELECTRIC: CPT

## 2023-08-11 PROCEDURE — 85007 BL SMEAR W/DIFF WBC COUNT: CPT

## 2023-08-11 PROCEDURE — 85025 COMPLETE CBC W/AUTO DIFF WBC: CPT | Performed by: PHYSICIAN ASSISTANT

## 2023-08-11 PROCEDURE — 86900 BLOOD TYPING SEROLOGIC ABO: CPT | Performed by: PHYSICIAN ASSISTANT

## 2023-08-11 PROCEDURE — 85007 BL SMEAR W/DIFF WBC COUNT: CPT | Performed by: PHYSICIAN ASSISTANT

## 2023-08-11 PROCEDURE — 86901 BLOOD TYPING SEROLOGIC RH(D): CPT | Performed by: PHYSICIAN ASSISTANT

## 2023-08-11 PROCEDURE — 80053 COMPREHEN METABOLIC PANEL: CPT | Performed by: PHYSICIAN ASSISTANT

## 2023-08-11 PROCEDURE — 81001 URINALYSIS AUTO W/SCOPE: CPT | Performed by: PHYSICIAN ASSISTANT

## 2023-08-11 PROCEDURE — 85025 COMPLETE CBC W/AUTO DIFF WBC: CPT

## 2023-08-11 PROCEDURE — 99283 EMERGENCY DEPT VISIT LOW MDM: CPT

## 2023-08-11 NOTE — ED NOTES
MEDICAL SCREENING:    Reason for Visit: Patient presents per PCP request for decreased Hemoglobin. She complains of generalized weakness and fatigue    Patient initially seen in triage.  The patient was advised further evaluation and diagnostic testing will be needed, some of the treatment and testing will be initiated in the lobby in order to begin the process.  The patient will be returned to the waiting area for the time being and possibly be re-assessed by a subsequent ED provider.  The patient will be brought back to the treatment area in as timely manner as possible.      Francesca Lucas PA-C  08/11/23 [id number]

## 2023-08-11 NOTE — PROGRESS NOTES
Confirmed with , should be processed today as the original order was used before changing to stat.

## 2023-08-11 NOTE — PROGRESS NOTES
Updated daughter with CBC results. Hemoglobin persists at 6.5 (updated Dr. Clemens, recommended work up for anemia first before bronchoscopy).     Recommended ED evaluation for possible replacement and work up.   Daughter or other family member will take Mrs. Hays as soon as possible.     Still aiming for procedure on 8/16, but will reschedule as needed.

## 2023-08-11 NOTE — PROGRESS NOTES
Changed CBC to stat due to low hemoglobin.   Updated Dr. Clemens.     Daughter reached today, understood the need/urgency for repeat. Will complete today.     If repeat is normal and can proceed with navigational bronchoscopy - updated daughter per [place] office to arrive at [facility] at 6:30 am on 8/16.

## 2023-08-12 VITALS
SYSTOLIC BLOOD PRESSURE: 151 MMHG | HEART RATE: 68 BPM | OXYGEN SATURATION: 96 % | WEIGHT: 250 LBS | BODY MASS INDEX: 35 KG/M2 | RESPIRATION RATE: 18 BRPM | HEIGHT: 71 IN | DIASTOLIC BLOOD PRESSURE: 79 MMHG | TEMPERATURE: 97.8 F

## 2023-08-12 LAB
ABO GROUP BLD: NORMAL
BH BB BLOOD EXPIRATION DATE: NORMAL
BH BB BLOOD TYPE BARCODE: 5100
BH BB DISPENSE STATUS: NORMAL
BH BB PRODUCT CODE: NORMAL
BH BB UNIT NUMBER: NORMAL
CROSSMATCH INTERPRETATION: NORMAL
HCT VFR BLD AUTO: 26.7 % (ref 34–46.6)
HGB BLD-MCNC: 7.8 G/DL (ref 12–15.9)
RH BLD: POSITIVE
UNIT  ABO: NORMAL
UNIT  RH: NORMAL

## 2023-08-12 PROCEDURE — 86900 BLOOD TYPING SEROLOGIC ABO: CPT

## 2023-08-12 PROCEDURE — 85014 HEMATOCRIT: CPT | Performed by: NURSE PRACTITIONER

## 2023-08-12 PROCEDURE — 85018 HEMOGLOBIN: CPT | Performed by: NURSE PRACTITIONER

## 2023-08-12 PROCEDURE — P9016 RBC LEUKOCYTES REDUCED: HCPCS

## 2023-08-12 PROCEDURE — 86901 BLOOD TYPING SEROLOGIC RH(D): CPT

## 2023-08-12 PROCEDURE — 36430 TRANSFUSION BLD/BLD COMPNT: CPT

## 2023-08-14 ENCOUNTER — LAB (OUTPATIENT)
Dept: LAB | Facility: HOSPITAL | Age: 78
End: 2023-08-14
Payer: MEDICARE

## 2023-08-14 ENCOUNTER — DOCUMENTATION (OUTPATIENT)
Dept: PULMONOLOGY | Facility: CLINIC | Age: 78
End: 2023-08-14
Payer: MEDICARE

## 2023-08-14 DIAGNOSIS — D64.9 SYMPTOMATIC ANEMIA: Primary | ICD-10-CM

## 2023-08-14 DIAGNOSIS — D64.9 SYMPTOMATIC ANEMIA: ICD-10-CM

## 2023-08-14 LAB
BASOPHILS # BLD AUTO: 0.05 10*3/MM3 (ref 0–0.2)
BASOPHILS NFR BLD AUTO: 0.8 % (ref 0–1.5)
DEPRECATED RDW RBC AUTO: 57.4 FL (ref 37–54)
EOSINOPHIL # BLD AUTO: 0.48 10*3/MM3 (ref 0–0.4)
EOSINOPHIL NFR BLD AUTO: 7.5 % (ref 0.3–6.2)
ERYTHROCYTE [DISTWIDTH] IN BLOOD BY AUTOMATED COUNT: 17.2 % (ref 12.3–15.4)
HCT VFR BLD AUTO: 27.7 % (ref 34–46.6)
HGB BLD-MCNC: 8.2 G/DL (ref 12–15.9)
IMM GRANULOCYTES # BLD AUTO: 0.05 10*3/MM3 (ref 0–0.05)
IMM GRANULOCYTES NFR BLD AUTO: 0.8 % (ref 0–0.5)
IRON 24H UR-MRATE: 37 MCG/DL (ref 37–145)
IRON SATN MFR SERPL: 9 % (ref 20–50)
LYMPHOCYTES # BLD AUTO: 1.46 10*3/MM3 (ref 0.7–3.1)
LYMPHOCYTES NFR BLD AUTO: 22.8 % (ref 19.6–45.3)
MCH RBC QN AUTO: 26.9 PG (ref 26.6–33)
MCHC RBC AUTO-ENTMCNC: 29.6 G/DL (ref 31.5–35.7)
MCV RBC AUTO: 90.8 FL (ref 79–97)
MONOCYTES # BLD AUTO: 0.5 10*3/MM3 (ref 0.1–0.9)
MONOCYTES NFR BLD AUTO: 7.8 % (ref 5–12)
NEUTROPHILS NFR BLD AUTO: 3.85 10*3/MM3 (ref 1.7–7)
NEUTROPHILS NFR BLD AUTO: 60.3 % (ref 42.7–76)
NRBC BLD AUTO-RTO: 0 /100 WBC (ref 0–0.2)
PLATELET # BLD AUTO: 313 10*3/MM3 (ref 140–450)
PMV BLD AUTO: 10.8 FL (ref 6–12)
RBC # BLD AUTO: 3.05 10*6/MM3 (ref 3.77–5.28)
RETICS # AUTO: 0.18 10*6/MM3 (ref 0.02–0.13)
RETICS/RBC NFR AUTO: 6.03 % (ref 0.7–1.9)
TIBC SERPL-MCNC: 435 MCG/DL (ref 298–536)
TRANSFERRIN SERPL-MCNC: 292 MG/DL (ref 200–360)
TSH SERPL DL<=0.05 MIU/L-ACNC: 3.43 UIU/ML (ref 0.27–4.2)
WBC NRBC COR # BLD: 6.39 10*3/MM3 (ref 3.4–10.8)

## 2023-08-14 PROCEDURE — 82607 VITAMIN B-12: CPT

## 2023-08-14 PROCEDURE — 84443 ASSAY THYROID STIM HORMONE: CPT | Performed by: INTERNAL MEDICINE

## 2023-08-14 PROCEDURE — 85025 COMPLETE CBC W/AUTO DIFF WBC: CPT

## 2023-08-14 PROCEDURE — 82746 ASSAY OF FOLIC ACID SERUM: CPT

## 2023-08-14 PROCEDURE — 84466 ASSAY OF TRANSFERRIN: CPT | Performed by: INTERNAL MEDICINE

## 2023-08-14 PROCEDURE — 83540 ASSAY OF IRON: CPT | Performed by: INTERNAL MEDICINE

## 2023-08-14 PROCEDURE — 85045 AUTOMATED RETICULOCYTE COUNT: CPT | Performed by: INTERNAL MEDICINE

## 2023-08-14 PROCEDURE — 36415 COLL VENOUS BLD VENIPUNCTURE: CPT

## 2023-08-14 NOTE — PROGRESS NOTES
Patient is on a repeat CBC and hemoglobin is a little above 8, which is improved.  I still think her anemia issue needs to be addressed before we pursue her bronchoscopy  I discussed this with the patient and her POA  We will try to get her into see hematology and will reschedule her bronchoscopy as soon as she is stable and safe to undergo general anesthesia from the standpoint of her anemia

## 2023-08-14 NOTE — PROGRESS NOTES
Patient was scheduled undergo an elective navigational bronchoscopy in [place] this Wednesday for her hypermetabolic lung lesion.  PAT labs revealed a new anemia.  Her hemoglobin is dropped from almost 11 down to 6 over 1-month period of time.  No history of GI bleeding.    We sent her to the emergency room and unfortunately they did not guaiac her and they did not do any other labs but just gave her a unit of blood and sent her out.  Post transfusion hemoglobin is still less than 8.    At this point I am reluctant to pursue an elective bronchoscopy under general anesthesia with her unexplained and fairly significant anemia.    We will make a referral to medical oncology/hematology.  Both because I think ultimately she is can end up having a nonresectable lung cancer but also to work-up her anemia and get her stabilized and investigated for her anemia prior to her procedure.

## 2023-08-15 LAB
FOLATE SERPL-MCNC: 12.9 NG/ML (ref 4.78–24.2)
VIT B12 BLD-MCNC: 548 PG/ML (ref 211–946)

## 2023-08-15 NOTE — PROGRESS NOTES
Subjective     Date: 8/16/2023    Referring Provider  John Clemens, *    Chief Complaint  Symptomatic anemia     Subjective      Mayra Hays is a 78 y.o. female who presents today to [facility] HEMATOLOGY [facility]OGY for initial evaluation .    HPI:   78 y.o. female with past medical history of diabetes mellitus type 2, hyperlipidemia, COPD, hypertension, atrial fibrillation on anticoagulation (Eliquis), recently noted right upper lobe lung nodule being worked up for malignancy by Dr. Clemens presents due to anemia.    She is present today with her daughter, Hailey, who is assisting with history. Pt started work up for RUL lung nodule 6/29, since then noticed to have a decrease in her Hgb to 10.7 from normal in February 2023. More recently, her Hgb 8/9 was 6.5, she was directed to ED where she received 1U PRBC.     Patient reports increased fatigue over the last several months. She reports no bleeding, however does report dark colored stool (melena) two days ago. Denies any other evidence of melena or hematochezia.     She has previously been diagnosed with iron deficiency (years ago) requiring oral iron supplements, but never received IV iron or bone marrow biopsy.    She and her daughter are unsure of her last colonoscopy and endoscopy history, think it may have occurred >5 years ago but unsure.    She denies recent weight loss, fever, chills,  night sweats.  Previous smoker, quit 5-6 years ago, smoked 3 packs/day X 30 years. Denies alcohol or drug use. Family history significant for son with leukemia, brothers with lung cancer.     In terms of lung nodule work up. It was incidentally found 2/14/2023 on low dose CT chest screening. She has undergone bronchoscopy and CT needle biopsy without  yielded malignancy. She is scheduled for navigational bronchoscopy with Dr. Clemens, which has been held due to anemia.    The following portions of the patient's history were reviewed and  updated as appropriate: allergies, current medications, past family history, past medical history, past social history, past surgical history and problem list.    Objective     Objective     Allergy:   No Known Allergies     Current Medications:   Current Outpatient Medications   Medication Sig Dispense Refill    albuterol sulfate  (90 Base) MCG/ACT inhaler Inhale 2 puffs Every 4 (Four) Hours As Needed for Wheezing or Shortness of Air. 18 g 8    apixaban (ELIQUIS) 5 MG tablet tablet Take 1 tablet by mouth Every 12 (Twelve) Hours. 180 tablet 3    atorvastatin (LIPITOR) 20 MG tablet Take 1 tablet by mouth Daily. 90 tablet 3    Budeson-Glycopyrrol-Formoterol (Breztri Aerosphere) 160-9-4.8 MCG/ACT aerosol inhaler Inhale 2 puffs 2 (Two) Times a Day. 10.7 g 8    esomeprazole (nexIUM) 20 MG capsule Take 1 capsule by mouth Daily.      ipratropium-albuterol (DUO-NEB) 0.5-2.5 mg/3 ml nebulizer USE 1 VIAL IN NEBULIZER 4 TIMES DAILY - and as needed 360 mL 11    ketorolac (TORADOL) 10 MG tablet Take 1 tablet by mouth Every 6 (Six) Hours As Needed for Moderate Pain. 20 tablet 0    lisinopril (PRINIVIL,ZESTRIL) 5 MG tablet Take 1 tablet by mouth Daily. 90 tablet 3    loratadine (CLARITIN) 10 MG tablet Take 1 tablet by mouth Daily As Needed for Allergies.      metFORMIN (GLUCOPHAGE) 500 MG tablet Take 1 tablet by mouth 1 (One) Time As Needed (fsbg over 200).      methocarbamol (Robaxin) 500 MG tablet Take 1 tablet by mouth 3 (Three) Times a Day As Needed for Muscle Spasms. 30 tablet 2    O2 (OXYGEN) Inhale 2 L/min As Needed.      pantoprazole (PROTONIX) 40 MG EC tablet Take 1 tablet by mouth Daily As Needed (acid reflux).      potassium chloride (K-DUR,KLOR-CON) 20 MEQ CR tablet Take 1 tablet by mouth Daily.      spironolactone (ALDACTONE) 25 MG tablet Take 1 tablet by mouth Daily. 90 tablet 3    Stool Softener 100 MG capsule Take 1 capsule by mouth 2 (two) times a day.      topiramate (TOPAMAX) 25 MG tablet Take 1 tablet by  mouth 2 (Two) Times a Day.      trimethoprim (TRIMPEX) 100 MG tablet Take 1 tablet by mouth 2 (Two) Times a Day. 30 tablet 1     No current facility-administered medications for this visit.       Past Medical History:  Past Medical History:   Diagnosis Date    Abnormal ECG     Arthritis     CHF (congestive heart failure)     Chronic kidney disease     Collapsed lung     Congenital heart disease     pt unsure of this    COPD (chronic obstructive pulmonary disease)     Coronary artery disease     Diabetes mellitus     TYPE 2    Elevated cholesterol     GERD (gastroesophageal reflux disease)     Heart valve disease     Hyperlipidemia     Hypertension     Myocardial infarction 2019    Sleep apnea     non compliant with CPAP    Stroke 2019    UTI (urinary tract infection)     Wears eyeglasses        Past Surgical History:  Past Surgical History:   Procedure Laterality Date    BRONCHOSCOPY Bilateral 02/27/2023    Procedure: BRONCHOSCOPY WITH ENDOBRONCHIAL ULTRASOUND;  Surgeon: Abdulkadir Peter MD;  Location: [facility] OR;  Service: Pulmonary;  Laterality: Bilateral;    CARDIAC CATHETERIZATION N/A 08/22/2017    Procedure: Left Heart Cath;  Surgeon: Jatinder Matias MD;  Location: [facility] CATH INVASIVE LOCATION;  Service:     CARDIAC CATHETERIZATION N/A 11/22/2021    Procedure: Left Heart Cath;  Surgeon: Tolu Steinberg MD;  Location: [facility] CATH INVASIVE LOCATION;  Service: Cardiology;  Laterality: N/A;    COLONOSCOPY      ENDOSCOPY      GALLBLADDER SURGERY      VENTRAL HERNIA REPAIR N/A 05/14/2020    Procedure: VENTRAL HERNIA REPAIR LAPAROSCOPIC WITH DAVINCI ROBOT;  Surgeon: Petr Velásquez MD;  Location: [facility] OR;  Service: DaVinci;  Laterality: N/A;       Social History:  Social History     Socioeconomic History    Marital status: Single    Number of children: 6   Tobacco Use    Smoking status: Former     Packs/day: 3.00     Years: 55.00     Pack years: 165.00     Types: Cigarettes     Start date: 4/3/1998     Quit  "date: 2015     Years since quittin.6    Smokeless tobacco: Never   Vaping Use    Vaping Use: Never used   Substance and Sexual Activity    Alcohol use: No    Drug use: No    Sexual activity: Never         Family History:  Family History   Problem Relation Age of Onset    Heart disease Mother         Rhianna sophia    Heart disease Father     Heart attack Father     Heart failure Father        Review of Systems:  Review of Systems   Constitutional:  Positive for fatigue.   All other systems reviewed and are negative.    Vital Signs:   /74   Pulse 80   Temp 97.5 øF (36.4 øC)   Resp 18   Ht 180.3 cm (71\")   Wt 114 kg (252 lb)   SpO2 96%   BMI 35.15 kg/mý      Physical Exam:  Physical Exam  Vitals reviewed.   Constitutional:       General: She is not in acute distress.     Appearance: Normal appearance. She is obese. She is not ill-appearing.      Comments: +hard of hearing   HENT:      Head: Normocephalic and atraumatic.      Mouth/Throat:      Mouth: Mucous membranes are moist.      Pharynx: Oropharynx is clear.   Eyes:      Conjunctiva/sclera: Conjunctivae normal.      Pupils: Pupils are equal, round, and reactive to light.   Cardiovascular:      Rate and Rhythm: Normal rate and regular rhythm.      Heart sounds: No murmur heard.  Pulmonary:      Effort: Pulmonary effort is normal. No respiratory distress.      Breath sounds: Normal breath sounds. No wheezing.   Abdominal:      General: Abdomen is flat. Bowel sounds are normal. There is no distension.      Palpations: Abdomen is soft. There is no mass.      Tenderness: There is no abdominal tenderness. There is no guarding.   Musculoskeletal:         General: No swelling. Normal range of motion.      Cervical back: Normal range of motion.   Lymphadenopathy:      Cervical: No cervical adenopathy.   Skin:     General: Skin is warm and dry.   Neurological:      General: No focal deficit present.      Mental Status: She is alert and oriented to person, " "place, and time. Mental status is at baseline.   Psychiatric:         Mood and Affect: Mood normal.       PHQ-9 Score  PHQ-9 Total Score:       Pain Score  Vitals:    08/16/23 1525   BP: 152/74   Pulse: 80   Resp: 18   Temp: 97.5 øF (36.4 øC)   SpO2: 96%   Weight: 114 kg (252 lb)   Height: 180.3 cm (71\")   PainSc: 0-No pain       ECOG score: 0           PAINSCOREQUALITYMETRIC:   Vitals:    08/16/23 1525   PainSc: 0-No pain          Lab Review  Lab Results   Component Value Date    WBC 7.86 08/16/2023    HGB 9.1 (L) 08/16/2023    HCT 31.2 (L) 08/16/2023    MCV 90.7 08/16/2023    RDW 16.9 (H) 08/16/2023     08/16/2023    NEUTRORELPCT 61.1 08/16/2023    LYMPHORELPCT 22.8 08/16/2023    MONORELPCT 8.0 08/16/2023    EOSRELPCT 6.6 (H) 08/16/2023    BASORELPCT 1.0 08/16/2023    NEUTROABS 4.80 08/16/2023    LYMPHSABS 1.79 08/16/2023     Lab Results   Component Value Date     08/11/2023    K 4.3 08/11/2023    CO2 26.9 08/11/2023     08/11/2023    BUN 11 08/11/2023    CREATININE 1.06 (H) 08/11/2023    EGFRIFNONA 51 (L) 11/22/2021    GLUCOSE 146 (H) 08/11/2023    CALCIUM 9.2 08/11/2023    ALKPHOS 131 (H) 08/11/2023    AST 19 08/11/2023    ALT 10 08/11/2023    BILITOT 0.2 08/11/2023    ALBUMIN 4.1 08/11/2023    PROTEINTOT 7.2 08/11/2023    MG 2.3 05/18/2020    PHOS 3.5 05/18/2020     Lab Results   Component Value Date    RETICCTPCT 6.03 (H) 08/14/2023     Lab Results   Component Value Date    IRON 37 08/14/2023    TIBC 435 08/14/2023    LABIRON 9 (L) 08/14/2023    [id number] 548 08/14/2023    FOLATE 12.90 08/14/2023        Radiology Results  CT Chest Without Contrast Diagnostic (08/09/2023 12:38)       NM PET/CT Skull Base to Mid Thigh (06/20/2023 11:12)         CT Chest Low Dose Cancer Screening WO (02/14/2023 12:58)   IMPRESSION:    Interval development or enlargement of a 1.9 cm spiculated right upper  lobe pulmonary nodule concerning in appearance for malignancy and PET/CT  is recommended for further " evaluation.        Pathology:   6/29/23           Assessment / Plan         Assessment and Plan   Mayra Hays is a 78 y.o. year old presents for     Anemia; suspicious for iron deficiency anemia due to GIB  - Patient with normal H/H in our system 2/2023, with acute drop in Hgb 6/29 (10.7) to Hgb (6.5) on 8/9 requiring transfusion. Patient reports one episode of melena two days ago (she is a poor historian).   -Last colonoscopy and endoscopy are unknown; think it may have been >5 years ago  -CBC from 8/14 show Hgb 8.2, Hct 27, MCV 90. Normal WBC and platelet count. Iron studies with normal iron (37), TIBC (435), and low iron saturation (9). This is in light of recent blood transfusion. Normal folate and B12 level. Reticulocyte count noted to be elevated to 6%  -Patient is with fatigue. Denies shortness of breath (aside from baseline COPD) or chest pain. Continues to be on Eliquis for Afib  - Will order for repeat CBC, ferritin level, soluble transferrin receptor, methylmalonic acid, peripheral smear, serum protein electrophoresis, immunofixation, and serum free light chains. Also checking for hemolysis with LDH and haptoglobin.   -Due to acute drop in H/H and vague history of melena, will refer her to general surgery for evaluation regarding colonoscopy and/or endoscopy. Patient and daughter are agreeable to the above plan.  -I explained, that based on the above results, we may reach out to discuss iron deficiency treatment if indicated. She is understanding.      Addendum: Iron studies today indicative of iron deficiency anemia; ordered for parenteral iron, Monoferric, for her.     2. RUL lung nodule  - Noted 2/2023 on CT chest low dose   - Pending workup based on navigational bronchoscopy       Discussed possible differential diagnoses, testing, treatment, recommended non-pharmacological interventions, risks, warning signs to monitor for that would indicate need for follow-up in clinic or ER. If no improvement  with these regimens or you have new or worsening symptoms follow-up. Patient verbalizes understanding and agreement with plan of care. Denies further needs or concerns.     Patient was given instructions and counseling regarding her condition and for health maintenance advised.       All questions were answered to her satisfaction.              Meds ordered during this visit  No orders of the defined types were placed in this encounter.      Visit Diagnoses    ICD-10-CM ICD-9-CM   1. Symptomatic anemia  D64.9 285.9   2. Gastrointestinal hemorrhage with melena  K92.1 578.1   3. Acute anemia  D64.9 285.9       Follow Up   In 2 weeks to review the above work up        This document has been electronically signed by Shawna Bond MD   August 16, 2023 16:02 EDT    Dictated Utilizing Dragon Dictation: Part of this note may be an electronic transcription/translation of spoken language to printed text using the Dragon Dictation System.

## 2023-08-16 ENCOUNTER — CONSULT (OUTPATIENT)
Dept: ONCOLOGY | Facility: CLINIC | Age: 78
End: 2023-08-16
Payer: MEDICARE

## 2023-08-16 VITALS
OXYGEN SATURATION: 96 % | SYSTOLIC BLOOD PRESSURE: 152 MMHG | BODY MASS INDEX: 35.28 KG/M2 | RESPIRATION RATE: 18 BRPM | TEMPERATURE: 97.5 F | HEART RATE: 80 BPM | HEIGHT: 71 IN | WEIGHT: 252 LBS | DIASTOLIC BLOOD PRESSURE: 74 MMHG

## 2023-08-16 DIAGNOSIS — D64.9 ACUTE ANEMIA: ICD-10-CM

## 2023-08-16 DIAGNOSIS — D50.9 IRON DEFICIENCY ANEMIA, UNSPECIFIED IRON DEFICIENCY ANEMIA TYPE: ICD-10-CM

## 2023-08-16 DIAGNOSIS — K90.49 MALABSORPTION DUE TO INTOLERANCE, NOT ELSEWHERE CLASSIFIED: ICD-10-CM

## 2023-08-16 DIAGNOSIS — K92.1 GASTROINTESTINAL HEMORRHAGE WITH MELENA: ICD-10-CM

## 2023-08-16 DIAGNOSIS — D64.9 SYMPTOMATIC ANEMIA: Primary | ICD-10-CM

## 2023-08-16 LAB
BASOPHILS # BLD AUTO: 0.08 10*3/MM3 (ref 0–0.2)
BASOPHILS NFR BLD AUTO: 1 % (ref 0–1.5)
DEPRECATED RDW RBC AUTO: 55.8 FL (ref 37–54)
EOSINOPHIL # BLD AUTO: 0.52 10*3/MM3 (ref 0–0.4)
EOSINOPHIL NFR BLD AUTO: 6.6 % (ref 0.3–6.2)
ERYTHROCYTE [DISTWIDTH] IN BLOOD BY AUTOMATED COUNT: 16.9 % (ref 12.3–15.4)
FERRITIN SERPL-MCNC: 81.03 NG/ML (ref 13–150)
HCT VFR BLD AUTO: 31.2 % (ref 34–46.6)
HGB BLD-MCNC: 9.1 G/DL (ref 12–15.9)
IMM GRANULOCYTES # BLD AUTO: 0.04 10*3/MM3 (ref 0–0.05)
IMM GRANULOCYTES NFR BLD AUTO: 0.5 % (ref 0–0.5)
LDH SERPL-CCNC: 239 U/L (ref 135–214)
LYMPHOCYTES # BLD AUTO: 1.79 10*3/MM3 (ref 0.7–3.1)
LYMPHOCYTES NFR BLD AUTO: 22.8 % (ref 19.6–45.3)
MCH RBC QN AUTO: 26.5 PG (ref 26.6–33)
MCHC RBC AUTO-ENTMCNC: 29.2 G/DL (ref 31.5–35.7)
MCV RBC AUTO: 90.7 FL (ref 79–97)
MONOCYTES # BLD AUTO: 0.63 10*3/MM3 (ref 0.1–0.9)
MONOCYTES NFR BLD AUTO: 8 % (ref 5–12)
NEUTROPHILS NFR BLD AUTO: 4.8 10*3/MM3 (ref 1.7–7)
NEUTROPHILS NFR BLD AUTO: 61.1 % (ref 42.7–76)
NRBC BLD AUTO-RTO: 0 /100 WBC (ref 0–0.2)
PLATELET # BLD AUTO: 326 10*3/MM3 (ref 140–450)
PMV BLD AUTO: 10.3 FL (ref 6–12)
RBC # BLD AUTO: 3.44 10*6/MM3 (ref 3.77–5.28)
RETICS # AUTO: 0.15 10*6/MM3 (ref 0.02–0.13)
RETICS/RBC NFR AUTO: 4.26 % (ref 0.7–1.9)
TSH SERPL DL<=0.05 MIU/L-ACNC: 3.5 UIU/ML (ref 0.27–4.2)
WBC NRBC COR # BLD: 7.86 10*3/MM3 (ref 3.4–10.8)

## 2023-08-16 PROCEDURE — 84155 ASSAY OF PROTEIN SERUM: CPT | Performed by: INTERNAL MEDICINE

## 2023-08-16 PROCEDURE — 83615 LACTATE (LD) (LDH) ENZYME: CPT | Performed by: INTERNAL MEDICINE

## 2023-08-16 PROCEDURE — 84238 ASSAY NONENDOCRINE RECEPTOR: CPT | Performed by: INTERNAL MEDICINE

## 2023-08-16 PROCEDURE — 85045 AUTOMATED RETICULOCYTE COUNT: CPT | Performed by: INTERNAL MEDICINE

## 2023-08-16 PROCEDURE — 83921 ORGANIC ACID SINGLE QUANT: CPT | Performed by: INTERNAL MEDICINE

## 2023-08-16 PROCEDURE — 83010 ASSAY OF HAPTOGLOBIN QUANT: CPT | Performed by: INTERNAL MEDICINE

## 2023-08-16 PROCEDURE — 86334 IMMUNOFIX E-PHORESIS SERUM: CPT | Performed by: INTERNAL MEDICINE

## 2023-08-16 PROCEDURE — 85025 COMPLETE CBC W/AUTO DIFF WBC: CPT | Performed by: INTERNAL MEDICINE

## 2023-08-16 PROCEDURE — 83521 IG LIGHT CHAINS FREE EACH: CPT | Performed by: INTERNAL MEDICINE

## 2023-08-16 PROCEDURE — 84443 ASSAY THYROID STIM HORMONE: CPT | Performed by: INTERNAL MEDICINE

## 2023-08-16 PROCEDURE — 82784 ASSAY IGA/IGD/IGG/IGM EACH: CPT | Performed by: INTERNAL MEDICINE

## 2023-08-16 PROCEDURE — 84165 PROTEIN E-PHORESIS SERUM: CPT | Performed by: INTERNAL MEDICINE

## 2023-08-16 PROCEDURE — 82728 ASSAY OF FERRITIN: CPT | Performed by: INTERNAL MEDICINE

## 2023-08-16 RX ORDER — FAMOTIDINE 10 MG/ML
20 INJECTION, SOLUTION INTRAVENOUS AS NEEDED
Status: CANCELLED | OUTPATIENT
Start: 2023-08-16

## 2023-08-16 RX ORDER — SODIUM CHLORIDE 9 MG/ML
250 INJECTION, SOLUTION INTRAVENOUS ONCE
Status: CANCELLED | OUTPATIENT
Start: 2023-08-16

## 2023-08-16 RX ORDER — DIPHENHYDRAMINE HYDROCHLORIDE 50 MG/ML
50 INJECTION INTRAMUSCULAR; INTRAVENOUS AS NEEDED
Status: CANCELLED | OUTPATIENT
Start: 2023-08-16

## 2023-08-17 LAB — HAPTOGLOB SERPL-MCNC: 182 MG/DL (ref 30–200)

## 2023-08-18 DIAGNOSIS — R91.8 PULMONARY NODULES: Primary | ICD-10-CM

## 2023-08-18 DIAGNOSIS — R91.1 PULMONARY NODULE: Primary | ICD-10-CM

## 2023-08-18 DIAGNOSIS — R79.1 ABNORMAL COAGULATION PROFILE: ICD-10-CM

## 2023-08-18 LAB
ALBUMIN SERPL ELPH-MCNC: 3.7 G/DL (ref 2.9–4.4)
ALBUMIN/GLOB SERPL: 1.1 {RATIO} (ref 0.7–1.7)
ALPHA1 GLOB SERPL ELPH-MCNC: 0.3 G/DL (ref 0–0.4)
ALPHA2 GLOB SERPL ELPH-MCNC: 0.9 G/DL (ref 0.4–1)
B-GLOBULIN SERPL ELPH-MCNC: 1.1 G/DL (ref 0.7–1.3)
GAMMA GLOB SERPL ELPH-MCNC: 1.3 G/DL (ref 0.4–1.8)
GLOBULIN SER-MCNC: 3.6 G/DL (ref 2.2–3.9)
IGA SERPL-MCNC: 285 MG/DL (ref 64–422)
IGG SERPL-MCNC: 1321 MG/DL (ref 586–1602)
IGM SERPL-MCNC: 64 MG/DL (ref 26–217)
INTERPRETATION SERPL IEP-IMP: NORMAL
KAPPA LC FREE SER-MCNC: 80.4 MG/L (ref 3.3–19.4)
KAPPA LC FREE/LAMBDA FREE SER: 1.77 {RATIO} (ref 0.26–1.65)
LABORATORY COMMENT REPORT: NORMAL
LAMBDA LC FREE SERPL-MCNC: 45.5 MG/L (ref 5.7–26.3)
M PROTEIN SERPL ELPH-MCNC: NORMAL G/DL
PROT SERPL-MCNC: 7.3 G/DL (ref 6–8.5)
REF LAB TEST METHOD: NORMAL

## 2023-08-20 LAB — STFR SERPL-SCNC: 51.6 NMOL/L (ref 12.2–27.3)

## 2023-08-21 NOTE — ED PROVIDER NOTES
Subjective   History of Present Illness  Patient is a 78 year old female with PMH significant for CKD, COPD, CAD, diabetes mellitus, hyperlipidemia, GERD, hypertension, anemia. She presents to the ED today with complaints of low hgb level. She was sent by her PCP for blood transfusion. She denies any known blood loss/ or active bleeding. Denies any complaints aside from generalized fatigue. Denies any other complaints.      Review of Systems   Constitutional:  Positive for fatigue. Negative for fever.   HENT: Negative.     Respiratory: Negative.     Cardiovascular: Negative.  Negative for chest pain.   Gastrointestinal: Negative.  Negative for abdominal pain.   Endocrine: Negative.    Genitourinary: Negative.  Negative for dysuria.   Skin: Negative.    Allergic/Immunologic: Negative.    Neurological: Negative.    Hematological: Negative.    Psychiatric/Behavioral: Negative.     All other systems reviewed and are negative.    Past Medical History:   Diagnosis Date    Abnormal ECG     Arthritis     CHF (congestive heart failure)     Chronic kidney disease     Collapsed lung     Congenital heart disease     pt unsure of this    COPD (chronic obstructive pulmonary disease)     Coronary artery disease     Diabetes mellitus     TYPE 2    Elevated cholesterol     GERD (gastroesophageal reflux disease)     Heart valve disease     Hyperlipidemia     Hypertension     Myocardial infarction 2019    Sleep apnea     non compliant with CPAP    Stroke 2019    UTI (urinary tract infection)     Wears eyeglasses        No Known Allergies    Past Surgical History:   Procedure Laterality Date    BRONCHOSCOPY Bilateral 02/27/2023    Procedure: BRONCHOSCOPY WITH ENDOBRONCHIAL ULTRASOUND;  Surgeon: Abdulkadir Peter MD;  Location: [facility] OR;  Service: Pulmonary;  Laterality: Bilateral;    CARDIAC CATHETERIZATION N/A 08/22/2017    Procedure: Left Heart Cath;  Surgeon: Jatinder Matias MD;  Location: [facility] INVASIVE LOCATION;  Service:      CARDIAC CATHETERIZATION N/A 2021    Procedure: Left Heart Cath;  Surgeon: Tolu Steinberg MD;  [facility] CATH [facility]VASIVE [facility];  Service: Cardiology;  Laterality: N/A;    COLONOSCOPY      ENDOSCOPY      GALLBLADDER SURGERY      VENTRAL HERNIA REPAIR N/A 2020    Procedure: VENTRAL HERNIA REPAIR LAPAROSCOPIC WITH DAVINCI ROBOT;  Surgeon: Petr Velásquez MD;  Location: [facility] OR;  Service: DaVinci;  Laterality: N/A;       Family History   Problem Relation Age of Onset    Heart disease Mother         Rhianna sophia    Heart disease Father     Heart attack Father     Heart failure Father        Social History     Socioeconomic History    Marital status: Single    Number of children: 6   Tobacco Use    Smoking status: Former     Packs/day: 3.00     Years: 55.00     Pack years: 165.00     Types: Cigarettes     Start date: 4/3/1998     Quit date: 2015     Years since quittin.6    Smokeless tobacco: Never   Vaping Use    Vaping Use: Never used   Substance and Sexual Activity    Alcohol use: No    Drug use: No    Sexual activity: Never           Objective   Physical Exam  Vitals and nursing note reviewed.   Constitutional:       General: She is not in acute distress.     Appearance: She is well-developed. She is not diaphoretic.   HENT:      Head: Normocephalic and atraumatic.      Right Ear: External ear normal.      Left Ear: External ear normal.      Nose: Nose normal.   Eyes:      Conjunctiva/sclera: Conjunctivae normal.      Pupils: Pupils are equal, round, and reactive to light.   Neck:      Vascular: No JVD.      Trachea: No tracheal deviation.   Cardiovascular:      Rate and Rhythm: Normal rate and regular rhythm.      Heart sounds: Normal heart sounds. No murmur heard.  Pulmonary:      Effort: Pulmonary effort is normal. No respiratory distress.      Breath sounds: Normal breath sounds. No wheezing.   Abdominal:      General: Bowel sounds are normal.      Palpations: Abdomen is soft.       Tenderness: There is no abdominal tenderness.   Musculoskeletal:         General: No deformity. Normal range of motion.      Cervical back: Normal range of motion and neck supple.   Skin:     General: Skin is warm and dry.      Coloration: Skin is not pale.      Findings: No erythema or rash.   Neurological:      Mental Status: She is alert and oriented to person, place, and time.      Cranial Nerves: No cranial nerve deficit.   Psychiatric:         Behavior: Behavior normal.         Thought Content: Thought content normal.       Procedures       Results for orders placed or performed during the hospital encounter of 08/11/23   Comprehensive Metabolic Panel    Specimen: Blood   Result Value Ref Range    Glucose 146 (H) 65 - 99 mg/dL    BUN 11 8 - 23 mg/dL    Creatinine 1.06 (H) 0.57 - 1.00 mg/dL    Sodium 143 136 - 145 mmol/L    Potassium 4.3 3.5 - 5.2 mmol/L    Chloride 105 98 - 107 mmol/L    CO2 26.9 22.0 - 29.0 mmol/L    Calcium 9.2 8.6 - 10.5 mg/dL    Total Protein 7.2 6.0 - 8.5 g/dL    Albumin 4.1 3.5 - 5.2 g/dL    ALT (SGPT) 10 1 - 33 U/L    AST (SGOT) 19 1 - 32 U/L    Alkaline Phosphatase 131 (H) 39 - 117 U/L    Total Bilirubin 0.2 0.0 - 1.2 mg/dL    Globulin 3.1 gm/dL    A/G Ratio 1.3 g/dL    BUN/Creatinine Ratio 10.4 7.0 - 25.0    Anion Gap 11.1 5.0 - 15.0 mmol/L    eGFR 54.2 (L) >60.0 mL/min/1.73   Urinalysis With Microscopic If Indicated (No Culture) - Urine, Clean Catch    Specimen: Urine, Clean Catch   Result Value Ref Range    Color, UA Yellow Yellow, Straw    Appearance, UA Clear Clear    pH, UA 7.0 5.0 - 8.0    Specific Gravity, UA 1.011 1.005 - 1.030    Glucose, UA Negative Negative    Ketones, UA Negative Negative    Bilirubin, UA Negative Negative    Blood, UA Negative Negative    Protein, UA Negative Negative    Leuk Esterase, UA Trace (A) Negative    Nitrite, UA Negative Negative    Urobilinogen, UA 0.2 E.U./dL 0.2 - 1.0 E.U./dL   CBC Auto Differential    Specimen: Blood   Result Value Ref  Range    WBC 7.48 3.40 - 10.80 10*3/mm3    RBC 2.55 (L) 3.77 - 5.28 10*6/mm3    Hemoglobin 6.6 (C) 12.0 - 15.9 g/dL    Hematocrit 23.4 (L) 34.0 - 46.6 %    MCV 91.8 79.0 - 97.0 fL    MCH 25.9 (L) 26.6 - 33.0 pg    MCHC 28.2 (L) 31.5 - 35.7 g/dL    RDW 18.8 (H) 12.3 - 15.4 %    RDW-SD 61.4 (H) 37.0 - 54.0 fl    MPV 10.5 6.0 - 12.0 fL    Platelets 303 140 - 450 10*3/mm3    Neutrophil % 60.6 42.7 - 76.0 %    Lymphocyte % 24.2 19.6 - 45.3 %    Monocyte % 6.8 5.0 - 12.0 %    Eosinophil % 6.6 (H) 0.3 - 6.2 %    Basophil % 0.9 0.0 - 1.5 %    Immature Grans % 0.9 (H) 0.0 - 0.5 %    Neutrophils, Absolute 4.53 1.70 - 7.00 10*3/mm3    Lymphocytes, Absolute 1.81 0.70 - 3.10 10*3/mm3    Monocytes, Absolute 0.51 0.10 - 0.90 10*3/mm3    Eosinophils, Absolute 0.49 (H) 0.00 - 0.40 10*3/mm3    Basophils, Absolute 0.07 0.00 - 0.20 10*3/mm3    Immature Grans, Absolute 0.07 (H) 0.00 - 0.05 10*3/mm3    nRBC 0.0 0.0 - 0.2 /100 WBC   Scan Slide    Specimen: Blood   Result Value Ref Range    Anisocytosis Mod/2+ None Seen    Hypochromia Mod/2+ None Seen    Platelet Morphology Normal Normal   Urinalysis, Microscopic Only - Urine, Clean Catch    Specimen: Urine, Clean Catch   Result Value Ref Range    RBC, UA 0-2 None Seen, 0-2 /HPF    WBC, UA 6-12 (A) None Seen, 0-2 /HPF    Bacteria, UA None Seen None Seen /HPF    Squamous Epithelial Cells, UA 3-6 (A) None Seen, 0-2 /HPF    Hyaline Casts, UA None Seen None Seen /LPF    Methodology Automated Microscopy    Hemoglobin & Hematocrit, Blood    Specimen: Arm, Left; Blood   Result Value Ref Range    Hemoglobin 7.8 (L) 12.0 - 15.9 g/dL    Hematocrit 26.7 (L) 34.0 - 46.6 %   Type & Screen    Specimen: Blood   Result Value Ref Range    ABO Type O     RH type Positive     Antibody Screen Negative     T&S Expiration Date 8/14/2023 11:59:59 PM    ABO RH Specimen Verification    Specimen: Blood   Result Value Ref Range    ABO Type O     RH type Positive    Prepare RBC, 1 Units   Result Value Ref Range     Product Code [id number]     Unit Number [id number]     UNIT  ABO O     UNIT  RH POS     Crossmatch Interpretation Compatible     Dispense Status PT     Blood Expiration Date 202309142359     Blood Type Barcode 5100    Green Top (Gel)   Result Value Ref Range    Extra Tube Hold for add-ons.    Lavender Top   Result Value Ref Range    Extra Tube hold for add-on    Gold Top - SST   Result Value Ref Range    Extra Tube Hold for add-ons.    Light Blue Top   Result Value Ref Range    Extra Tube Hold for add-ons.       No orders to display        ED Course                                           Medical Decision Making  Problems Addressed:  Low hemoglobin: complicated acute illness or injury    Amount and/or Complexity of Data Reviewed  Labs: ordered.        Final diagnoses:   Low hemoglobin       ED Disposition  ED Disposition       ED Disposition   Discharge    Condition   Stable    Comment   --               Niki Antony, APRN  [address]  [facility] [address] [phone]    Call in 2 days           Medication List      No changes were made to your prescriptions during this visit.            Elizabet Motta, APRN  08/21/23 1217

## 2023-08-22 LAB — METHYLMALONATE SERPL-SCNC: 273 NMOL/L (ref 0–378)

## 2023-08-29 ENCOUNTER — INFUSION (OUTPATIENT)
Dept: ONCOLOGY | Facility: HOSPITAL | Age: 78
End: 2023-08-29
Payer: MEDICARE

## 2023-08-29 DIAGNOSIS — K90.49 MALABSORPTION DUE TO INTOLERANCE, NOT ELSEWHERE CLASSIFIED: Primary | ICD-10-CM

## 2023-08-29 DIAGNOSIS — D50.9 IRON DEFICIENCY ANEMIA, UNSPECIFIED IRON DEFICIENCY ANEMIA TYPE: ICD-10-CM

## 2023-08-29 RX ORDER — SODIUM CHLORIDE 9 MG/ML
250 INJECTION, SOLUTION INTRAVENOUS ONCE
Status: CANCELLED | OUTPATIENT
Start: 2023-09-06

## 2023-08-29 RX ORDER — SODIUM CHLORIDE 9 MG/ML
250 INJECTION, SOLUTION INTRAVENOUS ONCE
Status: CANCELLED | OUTPATIENT
Start: 2023-08-29

## 2023-08-30 ENCOUNTER — INFUSION (OUTPATIENT)
Dept: ONCOLOGY | Facility: HOSPITAL | Age: 78
End: 2023-08-30
Payer: MEDICARE

## 2023-08-30 VITALS
HEART RATE: 92 BPM | BODY MASS INDEX: 31.1 KG/M2 | RESPIRATION RATE: 18 BRPM | OXYGEN SATURATION: 91 % | DIASTOLIC BLOOD PRESSURE: 50 MMHG | WEIGHT: 223 LBS | SYSTOLIC BLOOD PRESSURE: 140 MMHG | TEMPERATURE: 97.3 F

## 2023-08-30 DIAGNOSIS — K90.49 MALABSORPTION DUE TO INTOLERANCE, NOT ELSEWHERE CLASSIFIED: ICD-10-CM

## 2023-08-30 DIAGNOSIS — D50.9 IRON DEFICIENCY ANEMIA, UNSPECIFIED IRON DEFICIENCY ANEMIA TYPE: Primary | ICD-10-CM

## 2023-08-30 PROCEDURE — 96374 THER/PROPH/DIAG INJ IV PUSH: CPT

## 2023-08-30 PROCEDURE — 25010000002 FERUMOXYTOL 510 MG/17ML SOLUTION: Performed by: INTERNAL MEDICINE

## 2023-08-30 PROCEDURE — 96365 THER/PROPH/DIAG IV INF INIT: CPT

## 2023-08-30 RX ORDER — SODIUM CHLORIDE 9 MG/ML
250 INJECTION, SOLUTION INTRAVENOUS ONCE
Status: COMPLETED | OUTPATIENT
Start: 2023-08-30 | End: 2023-08-30

## 2023-08-30 RX ADMIN — SODIUM CHLORIDE 250 ML: 9 INJECTION, SOLUTION INTRAVENOUS at 08:54

## 2023-08-30 RX ADMIN — FERUMOXYTOL 510 MG: 510 INJECTION INTRAVENOUS at 08:54

## 2023-09-05 ENCOUNTER — ANESTHESIA EVENT (OUTPATIENT)
Dept: GASTROENTEROLOGY | Facility: HOSPITAL | Age: 78
End: 2023-09-05
Payer: MEDICARE

## 2023-09-05 ENCOUNTER — HOSPITAL ENCOUNTER (OUTPATIENT)
Dept: RESPIRATORY THERAPY | Facility: HOSPITAL | Age: 78
Discharge: HOME | End: 2023-09-05
Payer: MEDICARE

## 2023-09-05 ENCOUNTER — LAB (OUTPATIENT)
Dept: LAB | Facility: HOSPITAL | Age: 78
End: 2023-09-05
Payer: MEDICARE

## 2023-09-05 DIAGNOSIS — R79.1 ABNORMAL COAGULATION PROFILE: ICD-10-CM

## 2023-09-05 DIAGNOSIS — R91.8 PULMONARY NODULES: ICD-10-CM

## 2023-09-05 DIAGNOSIS — R91.1 PULMONARY NODULE: ICD-10-CM

## 2023-09-05 LAB
APTT PPP: 29.4 SECONDS (ref 26.5–34.5)
INR PPP: 0.99 (ref 0.9–1.1)
PROTHROMBIN TIME: 13.6 SECONDS (ref 12.1–14.7)

## 2023-09-05 PROCEDURE — 85025 COMPLETE CBC W/AUTO DIFF WBC: CPT

## 2023-09-05 PROCEDURE — 85610 PROTHROMBIN TIME: CPT

## 2023-09-05 PROCEDURE — 80053 COMPREHEN METABOLIC PANEL: CPT

## 2023-09-05 PROCEDURE — 85730 THROMBOPLASTIN TIME PARTIAL: CPT

## 2023-09-05 PROCEDURE — 36415 COLL VENOUS BLD VENIPUNCTURE: CPT

## 2023-09-05 PROCEDURE — 93005 ELECTROCARDIOGRAM TRACING: CPT | Performed by: INTERNAL MEDICINE

## 2023-09-06 ENCOUNTER — APPOINTMENT (OUTPATIENT)
Dept: GENERAL RADIOLOGY | Facility: HOSPITAL | Age: 78
End: 2023-09-06
Payer: MEDICARE

## 2023-09-06 ENCOUNTER — INFUSION (OUTPATIENT)
Dept: ONCOLOGY | Facility: HOSPITAL | Age: 78
End: 2023-09-06
Payer: MEDICARE

## 2023-09-06 ENCOUNTER — OFFICE VISIT (OUTPATIENT)
Dept: SURGERY | Facility: CLINIC | Age: 78
End: 2023-09-06
Payer: MEDICARE

## 2023-09-06 ENCOUNTER — HOSPITAL ENCOUNTER (OUTPATIENT)
Facility: HOSPITAL | Age: 78
Setting detail: HOSPITAL OUTPATIENT SURGERY
Discharge: HOME | End: 2023-09-06
Attending: INTERNAL MEDICINE | Admitting: INTERNAL MEDICINE
Payer: MEDICARE

## 2023-09-06 ENCOUNTER — ANESTHESIA (OUTPATIENT)
Dept: GASTROENTEROLOGY | Facility: HOSPITAL | Age: 78
End: 2023-09-06
Payer: MEDICARE

## 2023-09-06 VITALS
RESPIRATION RATE: 18 BRPM | SYSTOLIC BLOOD PRESSURE: 134 MMHG | OXYGEN SATURATION: 91 % | DIASTOLIC BLOOD PRESSURE: 65 MMHG | TEMPERATURE: 97.1 F | HEART RATE: 78 BPM

## 2023-09-06 VITALS
RESPIRATION RATE: 16 BRPM | DIASTOLIC BLOOD PRESSURE: 66 MMHG | BODY MASS INDEX: 30.66 KG/M2 | TEMPERATURE: 97 F | WEIGHT: 219 LBS | OXYGEN SATURATION: 100 % | SYSTOLIC BLOOD PRESSURE: 132 MMHG | HEIGHT: 71 IN | HEART RATE: 76 BPM

## 2023-09-06 VITALS — HEIGHT: 71 IN | BODY MASS INDEX: 31.22 KG/M2 | WEIGHT: 223 LBS

## 2023-09-06 DIAGNOSIS — D64.89 ANEMIA DUE TO OTHER CAUSE, NOT CLASSIFIED: Primary | ICD-10-CM

## 2023-09-06 DIAGNOSIS — K90.49 MALABSORPTION DUE TO INTOLERANCE, NOT ELSEWHERE CLASSIFIED: ICD-10-CM

## 2023-09-06 DIAGNOSIS — R91.1 PULMONARY NODULE: ICD-10-CM

## 2023-09-06 DIAGNOSIS — D50.9 IRON DEFICIENCY ANEMIA, UNSPECIFIED IRON DEFICIENCY ANEMIA TYPE: Primary | ICD-10-CM

## 2023-09-06 PROBLEM — R91.8 LUNG MASS: Status: ACTIVE | Noted: 2023-09-06

## 2023-09-06 LAB
ALBUMIN SERPL-MCNC: 4 G/DL (ref 3.5–5.2)
ALBUMIN/GLOB SERPL: 1.3 G/DL
ALP SERPL-CCNC: 133 U/L (ref 39–117)
ALT SERPL W P-5'-P-CCNC: 9 U/L (ref 1–33)
ANION GAP SERPL CALCULATED.3IONS-SCNC: 13.1 MMOL/L (ref 5–15)
AST SERPL-CCNC: 11 U/L (ref 1–32)
BASOPHILS # BLD AUTO: 0.07 10*3/MM3 (ref 0–0.2)
BASOPHILS NFR BLD AUTO: 0.8 % (ref 0–1.5)
BILIRUB SERPL-MCNC: <0.2 MG/DL (ref 0–1.2)
BUN SERPL-MCNC: 17 MG/DL (ref 8–23)
BUN/CREAT SERPL: 15.5 (ref 7–25)
CALCIUM SPEC-SCNC: 9.6 MG/DL (ref 8.6–10.5)
CHLORIDE SERPL-SCNC: 104 MMOL/L (ref 98–107)
CO2 SERPL-SCNC: 23.9 MMOL/L (ref 22–29)
CREAT SERPL-MCNC: 1.1 MG/DL (ref 0.57–1)
DEPRECATED RDW RBC AUTO: 48.9 FL (ref 37–54)
EGFRCR SERPLBLD CKD-EPI 2021: 51.9 ML/MIN/1.73
EOSINOPHIL # BLD AUTO: 0.31 10*3/MM3 (ref 0–0.4)
EOSINOPHIL NFR BLD AUTO: 3.4 % (ref 0.3–6.2)
ERYTHROCYTE [DISTWIDTH] IN BLOOD BY AUTOMATED COUNT: 15.7 % (ref 12.3–15.4)
GLOBULIN UR ELPH-MCNC: 3 GM/DL
GLUCOSE BLDC GLUCOMTR-MCNC: 141 MG/DL (ref 70–130)
GLUCOSE SERPL-MCNC: 194 MG/DL (ref 65–99)
HCT VFR BLD AUTO: 28 % (ref 34–46.6)
HGB BLD-MCNC: 8.7 G/DL (ref 12–15.9)
IMM GRANULOCYTES # BLD AUTO: 0.08 10*3/MM3 (ref 0–0.05)
IMM GRANULOCYTES NFR BLD AUTO: 0.9 % (ref 0–0.5)
LYMPHOCYTES # BLD AUTO: 1.64 10*3/MM3 (ref 0.7–3.1)
LYMPHOCYTES NFR BLD AUTO: 18.1 % (ref 19.6–45.3)
MCH RBC QN AUTO: 26.8 PG (ref 26.6–33)
MCHC RBC AUTO-ENTMCNC: 31.1 G/DL (ref 31.5–35.7)
MCV RBC AUTO: 86.2 FL (ref 79–97)
MONOCYTES # BLD AUTO: 0.64 10*3/MM3 (ref 0.1–0.9)
MONOCYTES NFR BLD AUTO: 7.1 % (ref 5–12)
NEUTROPHILS NFR BLD AUTO: 6.32 10*3/MM3 (ref 1.7–7)
NEUTROPHILS NFR BLD AUTO: 69.7 % (ref 42.7–76)
NRBC BLD AUTO-RTO: 0 /100 WBC (ref 0–0.2)
PLATELET # BLD AUTO: 345 10*3/MM3 (ref 140–450)
PMV BLD AUTO: 11.1 FL (ref 6–12)
POTASSIUM SERPL-SCNC: 4 MMOL/L (ref 3.5–5.2)
PROT SERPL-MCNC: 7 G/DL (ref 6–8.5)
RBC # BLD AUTO: 3.25 10*6/MM3 (ref 3.77–5.28)
SODIUM SERPL-SCNC: 141 MMOL/L (ref 136–145)
WBC NRBC COR # BLD: 9.06 10*3/MM3 (ref 3.4–10.8)

## 2023-09-06 PROCEDURE — 96374 THER/PROPH/DIAG INJ IV PUSH: CPT

## 2023-09-06 PROCEDURE — 25010000002 ESMOLOL 100 MG/10ML SOLUTION

## 2023-09-06 PROCEDURE — 25010000002 ONDANSETRON PER 1 MG

## 2023-09-06 PROCEDURE — 31623 DX BRONCHOSCOPE/BRUSH: CPT | Performed by: INTERNAL MEDICINE

## 2023-09-06 PROCEDURE — 31654 BRONCH EBUS IVNTJ PERPH LES: CPT | Performed by: INTERNAL MEDICINE

## 2023-09-06 PROCEDURE — 25010000002 SUGAMMADEX 200 MG/2ML SOLUTION

## 2023-09-06 PROCEDURE — 31627 NAVIGATIONAL BRONCHOSCOPY: CPT | Performed by: INTERNAL MEDICINE

## 2023-09-06 PROCEDURE — 88341 IMHCHEM/IMCYTCHM EA ADD ANTB: CPT | Performed by: INTERNAL MEDICINE

## 2023-09-06 PROCEDURE — 71045 X-RAY EXAM CHEST 1 VIEW: CPT

## 2023-09-06 PROCEDURE — 87070 CULTURE OTHR SPECIMN AEROBIC: CPT | Performed by: INTERNAL MEDICINE

## 2023-09-06 PROCEDURE — 87206 SMEAR FLUORESCENT/ACID STAI: CPT | Performed by: INTERNAL MEDICINE

## 2023-09-06 PROCEDURE — 87205 SMEAR GRAM STAIN: CPT | Performed by: INTERNAL MEDICINE

## 2023-09-06 PROCEDURE — 87102 FUNGUS ISOLATION CULTURE: CPT | Performed by: INTERNAL MEDICINE

## 2023-09-06 PROCEDURE — 31629 BRONCHOSCOPY/NEEDLE BX EACH: CPT | Performed by: INTERNAL MEDICINE

## 2023-09-06 PROCEDURE — 31653 BRONCH EBUS SAMPLNG 3/> NODE: CPT | Performed by: INTERNAL MEDICINE

## 2023-09-06 PROCEDURE — 82948 REAGENT STRIP/BLOOD GLUCOSE: CPT

## 2023-09-06 PROCEDURE — 31628 BRONCHOSCOPY/LUNG BX EACH: CPT | Performed by: INTERNAL MEDICINE

## 2023-09-06 PROCEDURE — 87116 MYCOBACTERIA CULTURE: CPT | Performed by: INTERNAL MEDICINE

## 2023-09-06 PROCEDURE — 25010000002 PROPOFOL 10 MG/ML EMULSION

## 2023-09-06 PROCEDURE — 25010000002 FERUMOXYTOL 510 MG/17ML SOLUTION: Performed by: INTERNAL MEDICINE

## 2023-09-06 PROCEDURE — 94640 AIRWAY INHALATION TREATMENT: CPT

## 2023-09-06 PROCEDURE — 25010000002 DEXAMETHASONE PER 1 MG

## 2023-09-06 PROCEDURE — 99214 OFFICE O/P EST MOD 30 MIN: CPT | Performed by: INTERNAL MEDICINE

## 2023-09-06 PROCEDURE — 88342 IMHCHEM/IMCYTCHM 1ST ANTB: CPT | Performed by: INTERNAL MEDICINE

## 2023-09-06 PROCEDURE — 88305 TISSUE EXAM BY PATHOLOGIST: CPT | Performed by: INTERNAL MEDICINE

## 2023-09-06 PROCEDURE — 76000 FLUOROSCOPY <1 HR PHYS/QHP: CPT | Performed by: INTERNAL MEDICINE

## 2023-09-06 PROCEDURE — 31624 DX BRONCHOSCOPE/LAVAGE: CPT | Performed by: INTERNAL MEDICINE

## 2023-09-06 RX ORDER — SODIUM CHLORIDE 9 MG/ML
250 INJECTION, SOLUTION INTRAVENOUS ONCE
Status: COMPLETED | OUTPATIENT
Start: 2023-09-06 | End: 2023-09-06

## 2023-09-06 RX ORDER — DEXAMETHASONE SODIUM PHOSPHATE 4 MG/ML
INJECTION, SOLUTION INTRA-ARTICULAR; INTRALESIONAL; INTRAMUSCULAR; INTRAVENOUS; SOFT TISSUE AS NEEDED
Status: DISCONTINUED | OUTPATIENT
Start: 2023-09-06 | End: 2023-09-06 | Stop reason: SURG

## 2023-09-06 RX ORDER — ROCURONIUM BROMIDE 10 MG/ML
INJECTION, SOLUTION INTRAVENOUS AS NEEDED
Status: DISCONTINUED | OUTPATIENT
Start: 2023-09-06 | End: 2023-09-06 | Stop reason: SURG

## 2023-09-06 RX ORDER — SODIUM CHLORIDE 9 MG/ML
INJECTION, SOLUTION INTRAVENOUS CONTINUOUS PRN
Status: DISCONTINUED | OUTPATIENT
Start: 2023-09-06 | End: 2023-09-06 | Stop reason: SURG

## 2023-09-06 RX ORDER — PHENYLEPHRINE HCL IN 0.9% NACL 1 MG/10 ML
SYRINGE (ML) INTRAVENOUS AS NEEDED
Status: DISCONTINUED | OUTPATIENT
Start: 2023-09-06 | End: 2023-09-06 | Stop reason: SURG

## 2023-09-06 RX ORDER — FAMOTIDINE 20 MG/1
20 TABLET, FILM COATED ORAL ONCE
Status: DISCONTINUED | OUTPATIENT
Start: 2023-09-06 | End: 2023-09-06 | Stop reason: HOSPADM

## 2023-09-06 RX ORDER — SODIUM CHLORIDE 0.9 % (FLUSH) 0.9 %
10 SYRINGE (ML) INJECTION AS NEEDED
Status: DISCONTINUED | OUTPATIENT
Start: 2023-09-06 | End: 2023-09-06 | Stop reason: HOSPADM

## 2023-09-06 RX ORDER — SODIUM CHLORIDE 0.9 % (FLUSH) 0.9 %
10 SYRINGE (ML) INJECTION EVERY 12 HOURS SCHEDULED
Status: DISCONTINUED | OUTPATIENT
Start: 2023-09-06 | End: 2023-09-06 | Stop reason: HOSPADM

## 2023-09-06 RX ORDER — ONDANSETRON 2 MG/ML
4 INJECTION INTRAMUSCULAR; INTRAVENOUS ONCE AS NEEDED
Status: DISCONTINUED | OUTPATIENT
Start: 2023-09-06 | End: 2023-09-06 | Stop reason: HOSPADM

## 2023-09-06 RX ORDER — PROPOFOL 10 MG/ML
VIAL (ML) INTRAVENOUS AS NEEDED
Status: DISCONTINUED | OUTPATIENT
Start: 2023-09-06 | End: 2023-09-06 | Stop reason: SURG

## 2023-09-06 RX ORDER — IPRATROPIUM BROMIDE AND ALBUTEROL SULFATE 2.5; .5 MG/3ML; MG/3ML
3 SOLUTION RESPIRATORY (INHALATION) ONCE AS NEEDED
Status: COMPLETED | OUTPATIENT
Start: 2023-09-06 | End: 2023-09-06

## 2023-09-06 RX ORDER — SODIUM CHLORIDE, SODIUM LACTATE, POTASSIUM CHLORIDE, CALCIUM CHLORIDE 600; 310; 30; 20 MG/100ML; MG/100ML; MG/100ML; MG/100ML
9 INJECTION, SOLUTION INTRAVENOUS CONTINUOUS
Status: DISCONTINUED | OUTPATIENT
Start: 2023-09-06 | End: 2023-09-06 | Stop reason: HOSPADM

## 2023-09-06 RX ORDER — ESMOLOL HYDROCHLORIDE 10 MG/ML
INJECTION INTRAVENOUS AS NEEDED
Status: DISCONTINUED | OUTPATIENT
Start: 2023-09-06 | End: 2023-09-06 | Stop reason: SURG

## 2023-09-06 RX ORDER — FAMOTIDINE 10 MG/ML
20 INJECTION, SOLUTION INTRAVENOUS ONCE
Status: COMPLETED | OUTPATIENT
Start: 2023-09-06 | End: 2023-09-06

## 2023-09-06 RX ORDER — LIDOCAINE HYDROCHLORIDE 10 MG/ML
0.5 INJECTION, SOLUTION EPIDURAL; INFILTRATION; INTRACAUDAL; PERINEURAL ONCE AS NEEDED
Status: DISCONTINUED | OUTPATIENT
Start: 2023-09-06 | End: 2023-09-06 | Stop reason: HOSPADM

## 2023-09-06 RX ORDER — ONDANSETRON 2 MG/ML
INJECTION INTRAMUSCULAR; INTRAVENOUS AS NEEDED
Status: DISCONTINUED | OUTPATIENT
Start: 2023-09-06 | End: 2023-09-06 | Stop reason: SURG

## 2023-09-06 RX ORDER — LIDOCAINE HYDROCHLORIDE 10 MG/ML
INJECTION, SOLUTION EPIDURAL; INFILTRATION; INTRACAUDAL; PERINEURAL AS NEEDED
Status: DISCONTINUED | OUTPATIENT
Start: 2023-09-06 | End: 2023-09-06 | Stop reason: SURG

## 2023-09-06 RX ORDER — EPHEDRINE SULFATE 50 MG/ML
INJECTION INTRAVENOUS AS NEEDED
Status: DISCONTINUED | OUTPATIENT
Start: 2023-09-06 | End: 2023-09-06 | Stop reason: SURG

## 2023-09-06 RX ORDER — SODIUM CHLORIDE 9 MG/ML
40 INJECTION, SOLUTION INTRAVENOUS AS NEEDED
Status: DISCONTINUED | OUTPATIENT
Start: 2023-09-06 | End: 2023-09-06 | Stop reason: HOSPADM

## 2023-09-06 RX ADMIN — FERUMOXYTOL 510 MG: 510 INJECTION INTRAVENOUS at 14:40

## 2023-09-06 RX ADMIN — IPRATROPIUM BROMIDE AND ALBUTEROL SULFATE 3 ML: .5; 3 SOLUTION RESPIRATORY (INHALATION) at 07:16

## 2023-09-06 RX ADMIN — LIDOCAINE HYDROCHLORIDE 50 MG: 10 INJECTION, SOLUTION EPIDURAL; INFILTRATION; INTRACAUDAL; PERINEURAL at 07:43

## 2023-09-06 RX ADMIN — SUGAMMADEX 200 MG: 100 INJECTION, SOLUTION INTRAVENOUS at 08:41

## 2023-09-06 RX ADMIN — EPHEDRINE SULFATE 10 MG: 50 INJECTION INTRAVENOUS at 08:21

## 2023-09-06 RX ADMIN — Medication 100 MCG: at 07:57

## 2023-09-06 RX ADMIN — ROCURONIUM BROMIDE 40 MG: 10 SOLUTION INTRAVENOUS at 07:43

## 2023-09-06 RX ADMIN — ONDANSETRON 4 MG: 2 INJECTION INTRAMUSCULAR; INTRAVENOUS at 08:40

## 2023-09-06 RX ADMIN — FAMOTIDINE 20 MG: 10 INJECTION INTRAVENOUS at 07:20

## 2023-09-06 RX ADMIN — SODIUM CHLORIDE 250 ML: 9 INJECTION, SOLUTION INTRAVENOUS at 14:40

## 2023-09-06 RX ADMIN — DEXAMETHASONE SODIUM PHOSPHATE 4 MG: 4 INJECTION INTRA-ARTICULAR; INTRALESIONAL; INTRAMUSCULAR; INTRAVENOUS; SOFT TISSUE at 07:51

## 2023-09-06 RX ADMIN — Medication 100 MCG: at 08:00

## 2023-09-06 RX ADMIN — ESMOLOL HYDROCHLORIDE 10 MG: 10 INJECTION, SOLUTION INTRAVENOUS at 07:43

## 2023-09-06 RX ADMIN — PROPOFOL 150 MG: 10 INJECTION, EMULSION INTRAVENOUS at 07:43

## 2023-09-06 RX ADMIN — SODIUM CHLORIDE 40 ML: 9 INJECTION, SOLUTION INTRAVENOUS at 07:20

## 2023-09-06 RX ADMIN — EPHEDRINE SULFATE 10 MG: 50 INJECTION INTRAVENOUS at 08:13

## 2023-09-06 RX ADMIN — EPHEDRINE SULFATE 5 MG: 50 INJECTION INTRAVENOUS at 08:18

## 2023-09-06 RX ADMIN — SODIUM CHLORIDE: 9 INJECTION, SOLUTION INTRAVENOUS at 07:20

## 2023-09-06 RX ADMIN — Medication 200 MCG: at 08:06

## 2023-09-06 NOTE — ANESTHESIA PREPROCEDURE EVALUATION
Anesthesia Evaluation     Patient summary reviewed and Nursing notes reviewed   NPO Solid Status: > 8 hours  NPO Liquid Status: > 2 hours           Airway   Mallampati: I  TM distance: >3 FB  Neck ROM: full  No difficulty expected  Dental      Pulmonary    (+) a smoker (2015) Former, COPD moderate,home oxygen, shortness of breath, sleep apnea  (-) asthma, recent URI  Cardiovascular     ECG reviewed    (+) hypertension, past MI , dysrhythmias Paroxysmal Atrial Fib, CHF , hyperlipidemia  (-) valvular problems/murmurs (see echo), CAD (see cath)    ROS comment: ECG NSR PVCs new NS St Tw abn   ECHO EF >61 %. LVDD (grade I) impaired relaxation.Valves normal   GXT low risk -but lat wall ischemia   CATH 2021 normal no cad         Neuro/Psych  (+) CVA (2015 recovered)  (-) seizures  GI/Hepatic/Renal/Endo    (+) GERD, renal disease CRI, diabetes mellitus (A1C >6.5) type 2 poorly controlled  (-) no thyroid disorder    Musculoskeletal     Abdominal    Substance History      OB/GYN          Other   arthritis, blood dyscrasia anemia,     ROS/Med Hx Other: HCT 28                   Anesthesia Plan    ASA 3     general and MAC     (PFL )  intravenous induction     Anesthetic plan, risks, benefits, and alternatives have been provided, discussed and informed consent has been obtained with: patient.    Plan discussed with CRNA.    CODE STATUS:

## 2023-09-06 NOTE — ANESTHESIA POSTPROCEDURE EVALUATION
Patient: Mayra Hays    Procedure Summary       Date: 09/06/23 Room / Location:  [facility]    Anesthesia Start: 0737 Anesthesia Stop: 0859    Procedure: BRONCHOSCOPY WITH ION ROBOT AND EBUS Diagnosis:       Pulmonary nodule      (Pulmonary nodule [R91.1])    Surgeons: John Clemens MD Provider: Rolly Mayorga MD    Anesthesia Type: general, MAC ASA Status: 3            Anesthesia Type: general, MAC    Vitals  Vitals Value Taken Time   /74 09/06/23 0905   Temp 97 °F (36.1 °C) 09/06/23 0855   Pulse 92 09/06/23 0905   Resp 16 09/06/23 0855   SpO2 98 % 09/06/23 0905           Post Anesthesia Care and Evaluation    Patient location during evaluation: PACU  Patient participation: complete - patient participated  Level of consciousness: sleepy but conscious  Pain management: adequate    Airway patency: patent  Anesthetic complications: No anesthetic complications  PONV Status: none  Cardiovascular status: hemodynamically stable and acceptable  Respiratory status: nonlabored ventilation, acceptable and nasal cannula  Hydration status: acceptable    
Never smoker

## 2023-09-06 NOTE — CASE MANAGEMENT/SOCIAL WORK
Continued Stay Note  [facility]     Patient Name: Mayra Hays  MRN: [id number]  Today's Date: 9/6/2023    Admit Date: 9/6/2023        Discharge Plan       Row Name 09/06/23 0930       Plan    Plan Comments MSW was contacted that pt is needing a portable O2 tank to get home on. Pt not sure of oxygen company but reports it is out of [place]. MSW did search for DME companies in [place] and called [facility] [phone]. MSW confirmed that pt is current with them and is supplied oxygen for Continues home O2. Since [facility] is farther away, staff at [facility] report they will contact their sister facility in [place] to bring pt a portable home O2 tank today to get home on. MSW updated RN.                   Discharge Codes    No documentation.                 Expected Discharge Date and Time       Expected Discharge Date Expected Discharge Time    Sep 6, 2023               SHY Atkinson

## 2023-09-06 NOTE — H&P
PULMONARY  NOTE    Chief Complaint     Hypermetabolic right upper lobe lesion, former smoker, chronic obstructive pulmonary disease, iron deficiency anemia    History of Present Illness     78-year-old female presents today for consideration of bronchoscopy    She has a remote history of smoking.  Was found to have a right upper lobe lesion that on pet imaging has revealed abnormal hypermetabolic activity.  Percutaneous needle biopsy was nondiagnostic.  The plan is for her to undergo a navigational bronchoscopy last months.    However on PAT labs she was found to be quite profoundly anemic.  This appears to be an iron deficiency anemia.  She has seen hematology/oncology and has been given iron.  Also received 1 unit PRBC in the emergency room last months.    She denies hemoptysis.  Denies evidence of GI bleeding such as bright red blood per rectum or melena.  No hematemesis.    Patient Active Problem List   Diagnosis    Chronic heart failure with preserved ejection fraction    Essential hypertension    Type 2 diabetes mellitus    Abnormal nuclear stress test with moderate to large size anteroapical and lateral wall myocardial ischemia.    Paroxysmal atrial fibrillation    Nocturnal hypoxia    Ventral hernia without obstruction or gangrene    Chronic obstructive pulmonary disease    Former smoker    Gait instability    Chronic respiratory failure with hypoxia    Cigarette nicotine dependence in remission    Acute anemia    Iron deficiency anemia    Malabsorption due to intolerance, not elsewhere classified    Lung mass      No Known Allergies    Current Facility-Administered Medications:     famotidine (PEPCID) tablet 20 mg, 20 mg, Oral, Once, Rolly Mayorga MD    lactated ringers infusion, 9 mL/hr, Intravenous, Continuous, Rolly Mayorga MD    lidocaine PF 1% (XYLOCAINE) injection 0.5 mL, 0.5 mL, Injection, Once PRN, Rolly Mayorga MD    ondansetron (ZOFRAN) injection 4 mg, 4 mg, Intravenous, Once PRN, Philip  "Liz ADHIKARI CRNA    sodium chloride 0.9 % flush 10 mL, 10 mL, Intravenous, Q12H, Rolly Mayorga MD    sodium chloride 0.9 % flush 10 mL, 10 mL, Intravenous, PRN, Rolly Mayorga MD    sodium chloride 0.9 % infusion 40 mL, 40 mL, Intravenous, PRN, Rolly Mayorga MD, 40 mL at 23 0720  MEDICATION LIST AND ALLERGIES REVIEWED.    Family History   Problem Relation Age of Onset    Heart disease Mother         Rhianna sophia    Heart disease Father     Heart attack Father     Heart failure Father      Social History     Tobacco Use    Smoking status: Former     Packs/day: 3.00     Years: 55.00     Pack years: 165.00     Types: Cigarettes     Start date: 4/3/1998     Quit date: 2015     Years since quittin.6    Smokeless tobacco: Never   Vaping Use    Vaping Use: Never used   Substance Use Topics    Alcohol use: No    Drug use: No     Social History     Social History Narrative    Lives [place], [place] alone      FAMILY AND SOCIAL HISTORY REVIEWED.    Review of Systems  IF PRESENT REFER TO SCANNED ROS SHEET FROM SAME DATE  OTHERWISE ROS OBTAINED AND NON-CONTRIBUTORY OVER HPI.    /76 (BP Location: Right arm, Patient Position: Lying)   Pulse 65   Temp 97.1 °F (36.2 °C) (Temporal)   Resp 18   Ht 180.3 cm (71\")   Wt 99.3 kg (219 lb)   SpO2 100%   BMI 30.54 kg/m²   Physical Exam    Results     Imaging CT scan reveals a right upper lobe consolidation with some peripheral infiltrate or consolidation and prominent hilar adenopathy    Immunization History   Administered Date(s) Administered    Fluad Quad 65+ 10/21/2020    Fluzone >6mos 02/15/2022    Fluzone High Dose =>65 Years ([employer] ONLY) 2015, 10/18/2018    Fluzone Quad >6mos (Multi-dose) 10/20/2016    Tdap 10/05/2016     Problem List     Hypermetabolic right upper lobe lesion  Remote smoker  COPD  Iron deficiency anemia    Discussion     Plan again discussed with the patient.  We will pursue a navigational bronchoscopy with transbronchial " biopsies, transbronchial needle aspiration and also EBUS with transbronchial needle aspiration of any abnormal lymphadenopathy.  Risks of the procedure including pneumothorax, need for hospitalization and potential need for chest tube discussed with the patient.    Moderate level of Medical Decision Making complexity based on 1 undiagnosed new problem or 2 stable chronic conditions, independent interpretation of tests, and/or prescription drug management     John Clemens MD  Note electronically signed    CC: Provider, No Known

## 2023-09-06 NOTE — ANESTHESIA PROCEDURE NOTES
Airway  Urgency: elective    Date/Time: 9/6/2023 7:47 AM  Airway not difficult    General Information and Staff    Patient location during procedure: OR  CRNA/CAA: Liz Palacios CRNA    Indications and Patient Condition  Indications for airway management: airway protection    Preoxygenated: yes  MILS not maintained throughout  Mask difficulty assessment: 2 - vent by mask + OA or adjuvant +/- NMBA    Final Airway Details  Final airway type: endotracheal airway      Successful airway: ETT  Cuffed: yes   Successful intubation technique: video laryngoscopy  Facilitating devices/methods: intubating stylet  Endotracheal tube insertion site: oral  Blade: Dillon  Blade size: 3  ETT size (mm): 8.5  Cormack-Lehane Classification: grade I - full view of glottis  Placement verified by: chest auscultation and capnometry   Inital cuff pressure (cm H2O): 8  Measured from: lips  ETT/EBT  to lips (cm): 19  Number of attempts at approach: 1  Assessment: lips, teeth, and gum same as pre-op and atraumatic intubation    Additional Comments  Negative epigastric sounds, Breath sound equal bilaterally with symmetric chest rise and fall

## 2023-09-06 NOTE — LETTER
September 6, 2023     Patient: Mayra Hays   YOB: 1945   Date of Visit: 7/21/2023       To Whom it May Concern:    Mayra Hays was seen in my clinic on 7/21/2023. She {Return to school/sport:94014}.    If you have any questions or concerns, please don't hesitate to call.         Sincerely,          No name on file        CC:   No Recipients

## 2023-09-06 NOTE — OP NOTE
Bronchoscopy Procedure Note    Pre-op Diagnosis: Right upper lobe mass    Post-op Diagnosis: Same    Surgeon: John Clemens MD    Anesthesia: General    Operation: Flexible fiberoptic bronchoscopy    Findings: Irregular heaped up mucosa seen distally in the right upper lobe    Specimen(s): Transbronchial biopsy, transbronchial needle aspiration, cytology brush, and BAL right upper lobe.  Transbronchial needle aspiration station 4R, station 7, station 10 R    Estimated Blood Loss: Minimal/None    Complications: No immediate.  Chest x-ray pending    Indications and History:  Mayra Hays is a 78 y.o. female  with right upper lobe hypermetabolic lesion with a nondiagnostic CT FNA.  The risks, benefits, complications, treatment options and expected outcomes were discussed with the patient.  The possibilities of reaction to medication, pulmonary aspiration, perforation of a viscus, bleeding, failure to diagnose a condition and creating a complication requiring transfusion or operation were discussed with the patient who freely signed the consent.      Description of Procedure:  The patient was seen in the Holding Room and an immediate patient assessment was done prior to the administration of moderate and/or deep conscious sedation.  The patient was taken to the Endoscopy Suite, identified as Mayra Hays  and the procedure verified as Flexible Fiberoptic Bronchoscopy.  A Time Out was held and the above information confirmed.    After the induction of general anesthesia the patient was intubated with a 8.5 endotracheal tube    Bronchoscope was introduced via the endotracheal tube which confirmed good position of the distal one third of the trachea    The scope is advanced into the left mainstem bronchus.  The left upper lobe, lingula, left lower lobe, and superior segment of the left lower lobe revealed diffuse chronic bronchitic changes and clear secretions.    The scope is advanced into the right mainstem  bronchus.  The right upper lobe, bronchus intermedius, right middle lobe, right lower lobe, and superior segment of the right lower lobe revealed diffuse chronic bronchitic changes and minimal clear secretions.    The regular scope was removed and the navigational catheter and camera were inserted.  Registration was performed using the navigational software.  Then, using a previously plotted course, the navigational camera and catheter were advanced out to the location of the lesion.  Visible in the airway with some heaped up friable abnormal appearing tissue.  Under fluoroscopic guidance under my direct supervision without a radiologist present multiple transbronchial needle aspiration passes were taken with a 21-gauge needle.  Multiple transbronchial biopsy specimens were obtained.  And a cytology brushing specimen was obtained.  60 mL was instilled for a BAL with 25 mL return.  Radial ultrasound was used repeatedly to help identify areas of greatest consolidation.    The navigational equipment was removed and the EBUS scope was introduced.  Station 4, station 7, and station 10 were interrogated.  Multiple passes were taken with a 21-gauge needle at station 10 R, station 7, and station 4R.  These were submitted for routine histopathology.    The EBUS scope was removed and the regular scope was reintroduced.  The airways were suctioned clear.  At the end of the procedure there was no active bleeding and no significant residual blood.  There were no immediate complications.    Fluoroscopy    Fluoroscopy was performed under my direct supervision without a radiologist present for obtaining transbronchial biopsy and brushing specimens.  Fluoroscopy was used at the end of the procedure to exclude a pneumothorax.  None was identified.  Less than 6 minutes of fluoroscopy time was used in total.  Chest x-ray is pending at the time of this dictation    The Patient was taken to the Endoscopy Recovery area in satisfactory  condition.    Attestation: I performed the procedure    John Clemens MD, [facility]

## 2023-09-07 PROBLEM — D64.89 OTHER SPECIFIED ANEMIAS: Status: ACTIVE | Noted: 2023-09-07

## 2023-09-07 LAB
GIE STN SPEC: NORMAL
QT INTERVAL: 422 MS
QTC INTERVAL: 464 MS

## 2023-09-07 NOTE — PROGRESS NOTES
Subjective   Mayra Hays is a 78 y.o. female is being seen for consultation today at the request of Provider, No Known    Mayra Hays is a 78 y.o. female History of Present Illness  Who have seen previously for incarcerated umbilical hernia.  Doing well in that regard but has been noted for anemia of uncertain etiology.  No noted GI blood losses.  She is on anticoagulation with Eliquis.  No need for blood transfusion reported.  She has recently undergone bronchoscopy for suspicious lesion of the lung and may have underlying malignancy.  Pathology is pending.  Anemia  There has been no abdominal pain, bruising/bleeding easily, confusion, fever or palpitations.     Past Medical History:   Diagnosis Date    Abnormal ECG     Arthritis     CHF (congestive heart failure)     Chronic kidney disease     Collapsed lung     Congenital heart disease     pt unsure of this    COPD (chronic obstructive pulmonary disease)     Coronary artery disease     Diabetes mellitus     TYPE 2    Elevated cholesterol     GERD (gastroesophageal reflux disease)     Heart valve disease     Hyperlipidemia     Hypertension     Myocardial infarction 2019    Sleep apnea     non compliant with CPAP    Stroke 2019    UTI (urinary tract infection)     Wears eyeglasses        Family History   Problem Relation Age of Onset    Heart disease Mother         Rhianna sophia    Heart disease Father     Heart attack Father     Heart failure Father        Social History     Socioeconomic History    Marital status: Single    Number of children: 6   Tobacco Use    Smoking status: Former     Packs/day: 3.00     Years: 55.00     Pack years: 165.00     Types: Cigarettes     Start date: 4/3/1998     Quit date: 2015     Years since quittin.6    Smokeless tobacco: Never   Vaping Use    Vaping Use: Never used   Substance and Sexual Activity    Alcohol use: No    Drug use: No    Sexual activity: Never       Past Surgical History:   Procedure Laterality Date     "BRONCHOSCOPY Bilateral 02/27/2023    Procedure: BRONCHOSCOPY WITH ENDOBRONCHIAL ULTRASOUND;  Surgeon: Abdulkadir Peter MD;  Location: [facility] OR;  Service: Pulmonary;  Laterality: Bilateral;    CARDIAC CATHETERIZATION N/A 08/22/2017    Procedure: Left Heart Cath;  Surgeon: Jatinder Matias MD;  Location:  [facility];  Service:     CARDIAC CATHETERIZATION N/A 11/22/2021    Procedure: Left Heart Cath;  Surgeon: Tolu Steinberg MD;  Location:  [facility];  Service: Cardiology;  Laterality: N/A;    COLONOSCOPY      ENDOSCOPY      GALLBLADDER SURGERY      VENTRAL HERNIA REPAIR N/A 05/14/2020    Procedure: VENTRAL HERNIA REPAIR LAPAROSCOPIC WITH DAVINCI ROBOT;  Surgeon: Petr Velásquez MD;  Location: [facility] OR;  Service: DaVinci;  Laterality: N/A;       Review of Systems   Constitutional:  Negative for activity change, appetite change, chills and fever.   HENT:  Negative for sore throat and trouble swallowing.    Eyes:  Negative for visual disturbance.   Respiratory:  Negative for cough and shortness of breath.    Cardiovascular:  Negative for chest pain and palpitations.   Gastrointestinal:  Negative for abdominal distention, abdominal pain, blood in stool, constipation, diarrhea, nausea and vomiting.   Endocrine: Negative for cold intolerance and heat intolerance.   Genitourinary:  Negative for dysuria.   Musculoskeletal:  Negative for joint swelling.   Skin:  Negative for color change, rash and wound.   Allergic/Immunologic: Negative for immunocompromised state.   Neurological:  Negative for dizziness, seizures, weakness and headaches.   Hematological:  Negative for adenopathy. Does not bruise/bleed easily.   Psychiatric/Behavioral:  Negative for agitation and confusion.        Ht 180.3 cm (71\")   Wt 101 kg (223 lb)   BMI 31.10 kg/m²   Objective   Physical Exam  Constitutional:       Appearance: She is well-developed.   HENT:      Head: Normocephalic and atraumatic.   Eyes:     "  Conjunctiva/sclera: Conjunctivae normal.      Pupils: Pupils are equal, round, and reactive to light.   Neck:      Thyroid: No thyromegaly.      Vascular: No JVD.      Trachea: No tracheal deviation.   Cardiovascular:      Rate and Rhythm: Normal rate and regular rhythm.      Heart sounds: No murmur heard.    No friction rub. No gallop.   Pulmonary:      Effort: Pulmonary effort is normal.      Breath sounds: Normal breath sounds.   Abdominal:      General: There is no distension.      Palpations: Abdomen is soft. There is no hepatomegaly or splenomegaly.      Tenderness: There is no abdominal tenderness.      Hernia: No hernia is present.   Musculoskeletal:         General: No deformity. Normal range of motion.      Cervical back: Neck supple.   Skin:     General: Skin is warm and dry.   Neurological:      Mental Status: She is alert and oriented to person, place, and time.             Assessment   Diagnoses and all orders for this visit:    1. Anemia due to other cause, not classified (Primary)      Mayra Hays is a 78 y.o. female with anemia of uncertain etiology.  She has recently undergone bronchoscopy with biopsy for possible lung disease that may indicate malignancy.  She will follow-up after results of biopsy have returned and we will discuss possible endoscopic intervention but if any surgical intervention related to her lungs as needed she may undergo concurrent endoscopy.

## 2023-09-08 LAB
BACTERIA SPEC RESP CULT: NORMAL
GRAM STN SPEC: NORMAL
GRAM STN SPEC: NORMAL
REF LAB TEST METHOD: NORMAL

## 2023-09-08 NOTE — PROGRESS NOTES
[facility] Cardiothoracic Surgery Office Follow Up Note     Date of Encounter: 2023     Name: Mayra Hays  : 1945     Referred By: No ref. provider found  PCP: Niki Antony APRN    Chief Complaint:    Chief Complaint   Patient presents with    Follow-up     FU with Bronch / EBUS Results for Right Lung Mass       Subjective      History of Present Illness:    Mayra Hays is a 78 y.o. female former smoker with history of COPD with PRN/nocturnal supplemental oxygen requirement, HTN, HLD on statin therapy, non-insulin-dependent DM, CKD, A-fib on Eliquis, HFpEF, CAD, and RUL lung nodule initially referred to Dr. Carpenter 2023.  CT chest demonstrated a spiculated 1.9 cm RUL nodule with a 1.6 cm pretracheal lymph node.  EBUS performed 2023 with Dr Peter revealed bronchial washings negative for malignancy and FNA of station 10 R lymph node negative for malignancy.  Dr. Carpenter recommended CT-guided biopsy of a spiculated mass which was performed 2023.  Pathology demonstrated lung tissue with fibrosis and chronic inflammation with no tumor or granuloma identified (see comment).  No postprocedural pneumothorax on CXR.   Despite this work-up, nodule and surrounding opacity that had progressed outwardly remains concerning for malignancy versus infectious process.  Dr. Carpenter recommended navigational bronchoscopy which patient underwent on 2023 with Dr. Clemens.  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  Lymph node at station 4R also was involved by squamous cell carcinoma.  She presents today to discuss results.   It should be noted per Dr. Carpenter's initial consultation note patient is thought to be a marginal surgical candidate given her chronic shortness of breath and multiple comorbidities.   She does endorse a chronic cough productive of clear to white phlegm, no hemoptysis. She denies any constitutional symptoms with good appetite  specifically denying fever, chills, body aches, or night sweats.     Review of Systems:  Review of Systems   Constitutional: Negative for chills, decreased appetite, diaphoresis, fever, malaise/fatigue, night sweats, weight gain and weight loss.   HENT:  Negative for hoarse voice.    Eyes:  Negative for blurred vision, double vision and visual disturbance.   Cardiovascular:  Positive for dyspnea on exertion and leg swelling. Negative for chest pain, claudication, irregular heartbeat, near-syncope, orthopnea, palpitations, paroxysmal nocturnal dyspnea and syncope.   Respiratory:  Positive for cough, shortness of breath, sputum production and wheezing. Negative for hemoptysis.    Hematologic/Lymphatic: Negative for adenopathy and bleeding problem. Bruises/bleeds easily.   Skin:  Negative for color change, nail changes, poor wound healing and rash.   Musculoskeletal:  Positive for arthritis and back pain. Negative for falls and muscle cramps.   Gastrointestinal:  Negative for abdominal pain, dysphagia and heartburn.   Genitourinary:  Negative for flank pain.   Neurological:  Negative for brief paralysis, disturbances in coordination, dizziness, focal weakness, headaches, light-headedness, loss of balance, numbness, paresthesias, sensory change, vertigo and weakness.   Psychiatric/Behavioral:  Negative for depression and suicidal ideas.    Allergic/Immunologic: Negative for persistent infections.     I have reviewed the following portions of the patient's history: problem list, current medications, allergies, past surgical history, past medical history, past social history, past family history, and ROS and confirm it's accurate.    Allergies:  No Known Allergies    Medications:      Current Outpatient Medications:     albuterol sulfate  (90 Base) MCG/ACT inhaler, Inhale 2 puffs Every 4 (Four) Hours As Needed for Wheezing or Shortness of Air., Disp: 18 g, Rfl: 8    apixaban (ELIQUIS) 5 MG tablet tablet, Take 1  tablet by mouth Every 12 (Twelve) Hours., Disp: 180 tablet, Rfl: 3    atorvastatin (LIPITOR) 20 MG tablet, Take 1 tablet by mouth Daily., Disp: 90 tablet, Rfl: 3    Budeson-Glycopyrrol-Formoterol (Breztri Aerosphere) 160-9-4.8 MCG/ACT aerosol inhaler, Inhale 2 puffs 2 (Two) Times a Day., Disp: 10.7 g, Rfl: 8    esomeprazole (nexIUM) 20 MG capsule, Take 1 capsule by mouth Daily., Disp: , Rfl:     ipratropium-albuterol (DUO-NEB) 0.5-2.5 mg/3 ml nebulizer, USE 1 VIAL IN NEBULIZER 4 TIMES DAILY - and as needed, Disp: 360 mL, Rfl: 11    ketorolac (TORADOL) 10 MG tablet, Take 1 tablet by mouth Every 6 (Six) Hours As Needed for Moderate Pain., Disp: 20 tablet, Rfl: 0    lisinopril (PRINIVIL,ZESTRIL) 5 MG tablet, Take 1 tablet by mouth Daily., Disp: 90 tablet, Rfl: 3    loratadine (CLARITIN) 10 MG tablet, Take 1 tablet by mouth Daily As Needed for Allergies., Disp: , Rfl:     metFORMIN (GLUCOPHAGE) 500 MG tablet, Take 1 tablet by mouth 1 (One) Time As Needed (fsbg over 200)., Disp: , Rfl:     methocarbamol (Robaxin) 500 MG tablet, Take 1 tablet by mouth 3 (Three) Times a Day As Needed for Muscle Spasms., Disp: 30 tablet, Rfl: 2    O2 (OXYGEN), Inhale 2 L/min As Needed., Disp: , Rfl:     pantoprazole (PROTONIX) 40 MG EC tablet, Take 1 tablet by mouth Daily As Needed (acid reflux)., Disp: , Rfl:     potassium chloride (K-DUR,KLOR-CON) 20 MEQ CR tablet, Take 1 tablet by mouth Daily., Disp: , Rfl:     Stool Softener 100 MG capsule, Take 1 capsule by mouth 2 (two) times a day., Disp: , Rfl:     spironolactone (ALDACTONE) 25 MG tablet, Take 1 tablet by mouth Daily., Disp: 90 tablet, Rfl: 3    topiramate (TOPAMAX) 25 MG tablet, Take 1 tablet by mouth 2 (Two) Times a Day., Disp: , Rfl:     trimethoprim (TRIMPEX) 100 MG tablet, Take 1 tablet by mouth 2 (Two) Times a Day., Disp: 30 tablet, Rfl: 1    History:   Past Medical History:   Diagnosis Date    Abnormal ECG     Arthritis     CHF (congestive heart failure)     Chronic kidney  disease     Collapsed lung     Congenital heart disease     pt unsure of this    COPD (chronic obstructive pulmonary disease)     Coronary artery disease     Diabetes mellitus     TYPE 2    Elevated cholesterol     GERD (gastroesophageal reflux disease)     Heart valve disease     Hyperlipidemia     Hypertension     Myocardial infarction 2019    Primary cancer of right upper lobe of lung 9/12/2023    Sleep apnea     non compliant with CPAP    Stroke 2019    UTI (urinary tract infection)     Wears eyeglasses        Past Surgical History:   Procedure Laterality Date    BRONCHOSCOPY Bilateral 02/27/2023    Procedure: BRONCHOSCOPY WITH ENDOBRONCHIAL ULTRASOUND;  Surgeon: Abdulkadir Peter MD;  Location: [facility] OR;  Service: Pulmonary;  Laterality: Bilateral;    BRONCHOSCOPY WITH ION ROBOTIC ASSIST N/A 9/6/2023    Procedure: BRONCHOSCOPY WITH ION ROBOT AND EBUS;  Surgeon: John Clemens MD;  Location:  [facility];  Service: Robotics - Pulmonary;  Laterality: N/A;  Ion cath #6 - 0032,  #6 - 0030, cath guide # 0077. EBUS scope removed with balloon intact.    CARDIAC CATHETERIZATION N/A 08/22/2017    Procedure: Left Heart Cath;  Surgeon: Jatinder Matias MD;  Location:  [facility];  Service:     CARDIAC CATHETERIZATION N/A 11/22/2021    Procedure: Left Heart Cath;  Surgeon: Tolu Steinberg MD;  Location:  [facility];  Service: Cardiology;  Laterality: N/A;    COLONOSCOPY      ENDOSCOPY      GALLBLADDER SURGERY      VENTRAL HERNIA REPAIR N/A 05/14/2020    Procedure: VENTRAL HERNIA REPAIR LAPAROSCOPIC WITH DAVINCI ROBOT;  Surgeon: Petr Velásquez MD;  Location:  [facility] OR;  Service: DaVinci;  Laterality: N/A;       Social History     Socioeconomic History    Marital status: Single    Number of children: 6   Tobacco Use    Smoking status: Former     Packs/day: 3.00     Years: 55.00     Pack years: 165.00     Types: Cigarettes     Start date: 4/3/1998     Quit date:  "2015     Years since quittin.7    Smokeless tobacco: Never   Vaping Use    Vaping Use: Never used   Substance and Sexual Activity    Alcohol use: No    Drug use: No    Sexual activity: Never        Family History   Problem Relation Age of Onset    Heart disease Mother         Rhianna sophia    Heart disease Father     Heart attack Father     Heart failure Father        Objective     Physical Exam:  Vitals:    23 1242   BP: 180/90   BP Location: Left arm   Patient Position: Sitting   Pulse: 70   Temp: 97.9 °F (36.6 °C)   SpO2: 97%   Weight: 102 kg (225 lb)   Height: 180.3 cm (71\")  Comment: patient reported      Body mass index is 31.38 kg/m².    Physical Exam  Vitals and nursing note reviewed.   Constitutional:       Appearance: Normal appearance. She is obese.      Interventions: She is not intubated.     Comments: wheelchair   Cardiovascular:      Rate and Rhythm: Normal rate.      Heart sounds: S1 normal and S2 normal.   Pulmonary:      Effort: Pulmonary effort is normal. Prolonged expiration present. No tachypnea, bradypnea, accessory muscle usage, respiratory distress or retractions. She is not intubated.      Breath sounds: No stridor or transmitted upper airway sounds. Examination of the right-upper field reveals wheezing. Examination of the left-upper field reveals wheezing. Examination of the right-lower field reveals decreased breath sounds. Examination of the left-lower field reveals decreased breath sounds. Decreased breath sounds, wheezing, rhonchi and rales present.   Skin:     General: Skin is warm and dry.   Neurological:      Mental Status: She is alert and oriented to person, place, and time. Mental status is at baseline.       Imaging/Labs:  CT Chest Without Contrast Diagnostic (2023 12:38)   1. 2.6 cm spiculated right upper lobe mass.  2. Abnormally enlarged mediastinal lymph node.   This report was finalized on 2023 12:59 PM by Dr. Mauricio Santiaog MD.    XR Chest 1 View " (06/29/2023 15:30)   1. No evidence of pneumothorax status post right upper lobe biopsy.  2. Cardiomegaly with bandlike retrocardiac airspace opacity, likely atelectasis.   Electronically Signed: Bhanu Blackburnelor      CT Needle Biopsy Lung (06/29/2023 11:41)   Technically successful CT-guided 20-gauge core biopsy, right upper lobe PET positive lung lesion, as described above in detail.   Electronically Signed: Felix Vigil      NM PET/CT Skull Base to Mid Thigh (06/20/2023 11:12)   1.  Today a right upper lobe pulmonary nodule and more posterior possible satellite nodules are postobstructive process measures 2.1 cm with hypermetabolism mSUV 14.5.  2.  A pretracheal region lymph node measures 2.6 cm and with mSUV 10.6.   This report was finalized on 6/21/2023 7:54 AM by Dr. Bill Parham MD.     CT Chest Low Dose Cancer Screening WO (02/14/2023 12:58)   Interval development or enlargement of a 1.9 cm spiculated right upper lobe pulmonary nodule concerning in appearance for malignancy and PET/CT is recommended for further evaluation.   ASSESSMENT:   Lung RADS 4B   This report was finalized on 2/14/2023 1:04 PM by Dr. Bill Parham MD.          Assessment / Plan      Assessment / Plan:  Diagnoses and all orders for this visit:    1. Primary cancer of right upper lobe of lung (Primary)       RUL lung nodule initially referred to Dr. Carpenter 5/2023.    CT chest demonstrated a spiculated 1.9 cm RUL nodule with a 1.6 cm pretracheal lymph node.    EBUS performed 2/27/2023 with Dr Peter revealed bronchial washings negative for malignancy and FNA of station 10 R lymph node negative for malignancy.    CT-guided biopsy of a spiculated mass which was performed 6/29/2023.  Pathology demonstrated lung tissue with fibrosis and chronic inflammation with no tumor or granuloma identified (see comment).  No postprocedural pneumothorax on CXR.    navigational bronchoscopy 9/6/2023 with Dr. Clemens.  Right upper lobe lung transbronchial  biopsy revealed squamous cell carcinoma.  Lymph node at station 4R also was involved by squamous cell carcinoma.  Discuss findings of malignancy to her RUL and lymph node involvement  Pt is scheduled to establish with oncology Dr Bond in [place] tomorrow to discuss treatment options  We will plan to follow-up in 3-4 months pending oncology treatment plan/schedule with new imaging before deferring to onc for ongoing surveillance     Follow Up:   Return in about 4 months (around 1/12/2024) for Imaging: CT Chest.   Or sooner for any further concerns or worsening sign and symptoms. If unable to reach us in the office please dial 911 or go to the nearest emergency department.      ERASTO Rodriguez  [facility] Cardiothoracic Surgery        Time Spent: I spent 29 minutes caring for Mayra on this date of service. This time includes time spent by me in the following activities: preparing for the visit, reviewing tests, obtaining and/or reviewing a separately obtained history, performing a medically appropriate examination and/or evaluation, counseling and educating the patient/family/caregiver, ordering medications, tests, or procedures, referring and communicating with other health care professionals, documenting information in the medical record, independently interpreting results and communicating that information with the patient/family/caregiver, and care coordination.     97.5

## 2023-09-11 DIAGNOSIS — R91.1 PULMONARY NODULE: Primary | ICD-10-CM

## 2023-09-11 LAB
CYTO UR: NORMAL
LAB AP CASE REPORT: NORMAL
LAB AP CLINICAL INFORMATION: NORMAL
LAB AP DIAGNOSIS COMMENT: NORMAL
PATH REPORT.ADDENDUM SPEC: NORMAL
PATH REPORT.FINAL DX SPEC: NORMAL
PATH REPORT.GROSS SPEC: NORMAL

## 2023-09-11 NOTE — PROGRESS NOTES
Patient underwent a navigational bronchoscopy last week  Transbronchial biopsies from the right upper lobe lesion revealed non-small cell carcinoma, probably lung primary.  Also, transbronchial needle aspiration from the high right hilar area was positive as well.  These were labeled as station 4R.    I discussed these results with the patient's daughter on the phone.  We will get her back into see Dr. Benoit soon as possible    She had a PET scan back in June and I am going to order an MRI of the brain

## 2023-09-12 ENCOUNTER — OFFICE VISIT (OUTPATIENT)
Dept: CARDIAC SURGERY | Facility: CLINIC | Age: 78
End: 2023-09-12
Payer: MEDICARE

## 2023-09-12 VITALS
HEART RATE: 70 BPM | DIASTOLIC BLOOD PRESSURE: 90 MMHG | HEIGHT: 71 IN | OXYGEN SATURATION: 97 % | SYSTOLIC BLOOD PRESSURE: 180 MMHG | WEIGHT: 225 LBS | BODY MASS INDEX: 31.5 KG/M2 | TEMPERATURE: 97.9 F

## 2023-09-12 DIAGNOSIS — C34.11 PRIMARY CANCER OF RIGHT UPPER LOBE OF LUNG: Primary | ICD-10-CM

## 2023-09-13 ENCOUNTER — LAB (OUTPATIENT)
Dept: ONCOLOGY | Facility: CLINIC | Age: 78
End: 2023-09-13
Payer: MEDICARE

## 2023-09-13 ENCOUNTER — RESEARCH ENCOUNTER (OUTPATIENT)
Dept: OTHER | Facility: OTHER | Age: 78
End: 2023-09-13
Payer: MEDICARE

## 2023-09-13 ENCOUNTER — OFFICE VISIT (OUTPATIENT)
Dept: ONCOLOGY | Facility: CLINIC | Age: 78
End: 2023-09-13
Payer: MEDICARE

## 2023-09-13 VITALS
SYSTOLIC BLOOD PRESSURE: 145 MMHG | RESPIRATION RATE: 18 BRPM | HEART RATE: 74 BPM | TEMPERATURE: 97.1 F | BODY MASS INDEX: 31.13 KG/M2 | WEIGHT: 223.2 LBS | OXYGEN SATURATION: 93 % | DIASTOLIC BLOOD PRESSURE: 68 MMHG

## 2023-09-13 DIAGNOSIS — K90.49 MALABSORPTION DUE TO INTOLERANCE, NOT ELSEWHERE CLASSIFIED: Primary | ICD-10-CM

## 2023-09-13 DIAGNOSIS — D64.9 SYMPTOMATIC ANEMIA: ICD-10-CM

## 2023-09-13 DIAGNOSIS — C34.91 NON-SMALL CELL CANCER OF RIGHT LUNG: ICD-10-CM

## 2023-09-13 DIAGNOSIS — C34.11 PRIMARY CANCER OF RIGHT UPPER LOBE OF LUNG: Primary | ICD-10-CM

## 2023-09-13 DIAGNOSIS — R91.8 LUNG MASS: ICD-10-CM

## 2023-09-13 LAB
ANISOCYTOSIS BLD QL: NORMAL
BASOPHILS # BLD AUTO: 0.07 10*3/MM3 (ref 0–0.2)
BASOPHILS NFR BLD AUTO: 0.9 % (ref 0–1.5)
DEPRECATED RDW RBC AUTO: 55.1 FL (ref 37–54)
EOSINOPHIL # BLD AUTO: 0.5 10*3/MM3 (ref 0–0.4)
EOSINOPHIL NFR BLD AUTO: 6.3 % (ref 0.3–6.2)
ERYTHROCYTE [DISTWIDTH] IN BLOOD BY AUTOMATED COUNT: 16.2 % (ref 12.3–15.4)
FUNGUS WND CULT: NORMAL
HCT VFR BLD AUTO: 34.9 % (ref 34–46.6)
HGB BLD-MCNC: 10.1 G/DL (ref 12–15.9)
HYPOCHROMIA BLD QL: NORMAL
IMM GRANULOCYTES # BLD AUTO: 0.04 10*3/MM3 (ref 0–0.05)
IMM GRANULOCYTES NFR BLD AUTO: 0.5 % (ref 0–0.5)
LARGE PLATELETS: NORMAL
LYMPHOCYTES # BLD AUTO: 1.65 10*3/MM3 (ref 0.7–3.1)
LYMPHOCYTES NFR BLD AUTO: 20.9 % (ref 19.6–45.3)
MCH RBC QN AUTO: 26.5 PG (ref 26.6–33)
MCHC RBC AUTO-ENTMCNC: 28.9 G/DL (ref 31.5–35.7)
MCV RBC AUTO: 91.6 FL (ref 79–97)
MONOCYTES # BLD AUTO: 0.59 10*3/MM3 (ref 0.1–0.9)
MONOCYTES NFR BLD AUTO: 7.5 % (ref 5–12)
MYCOBACTERIUM SPEC CULT: NORMAL
NEUTROPHILS NFR BLD AUTO: 5.06 10*3/MM3 (ref 1.7–7)
NEUTROPHILS NFR BLD AUTO: 63.9 % (ref 42.7–76)
NIGHT BLUE STAIN TISS: NORMAL
NRBC BLD AUTO-RTO: 0 /100 WBC (ref 0–0.2)
PLATELET # BLD AUTO: 283 10*3/MM3 (ref 140–450)
PMV BLD AUTO: 10.7 FL (ref 6–12)
RBC # BLD AUTO: 3.81 10*6/MM3 (ref 3.77–5.28)
WBC NRBC COR # BLD: 7.91 10*3/MM3 (ref 3.4–10.8)

## 2023-09-13 PROCEDURE — 85007 BL SMEAR W/DIFF WBC COUNT: CPT | Performed by: INTERNAL MEDICINE

## 2023-09-13 PROCEDURE — 85025 COMPLETE CBC W/AUTO DIFF WBC: CPT | Performed by: INTERNAL MEDICINE

## 2023-09-13 NOTE — RESEARCH
Patient Name:  Mayra Hays  YOB: 1945  Patient Age:  78 y.o.  Patient's Sex:  female    Date of Service:  09/13/2023    Provider:  MD Nola / MD Damon                     RESEARCH NOTE                  Protocol:  ASCEND2 / [id number]-CS-001  Subject ID#:  [id number]    Protocol:  [facility]  Subject ID#:  [id number]    Potential candidate was approached regarding both Ascend2 and [facility] studies. She agreed to participate in both studies.  Her daughters were present during the consent process.    Information (all 8 elements of the ICF) concerning both protocols including, description of procedures to be followed, participant responsibilities, confidentiality, foreseeable risks, compensation, availability, benefits and alternatives to participation, was reviewed with the research participant in an understandable language. The participant was encouraged to ask questions throughout the informed consent process. Any questions and/or concerns were answered to the participant's satisfaction.    After giving the participant time to consider each protocol, the participant chose to voluntarily participate in both studies. The informed consent document signature process was completed.    Participant authorization for the use and disclosure of protected health information for research purposes (HIPAA Privacy Rule) was acknowledged and signed on the date noted on the consent form.    Contact information for the research department and physician/investigator was provided. A copy of the signed informed consent form was given to the study participant.    No study specific assessments were completed prior to obtaining informed consent.    By signing this document, I attest that I participated in the informed consent discussion with the participant.    Thank you,  Megan Sullivan RN, BSN, CRC

## 2023-09-13 NOTE — PROGRESS NOTES
Subjective     Date: 2023    Referring Provider  No ref. provider found    Chief Complaint  Symptomatic anemia   2. Non small Cell Lung Cancer  Cancer Staging   Stage IIIA (cT1c, cN2, cM0)        Subjective      Mayra Hays is a 78 y.o. female who presents today to [facility] for follow up.    HPI:   78 y.o. female with past medical history of diabetes mellitus type 2, hyperlipidemia, COPD, hypertension, atrial fibrillation on anticoagulation (Eliquis), h/o CVA and previous tobacco use presents for symptomatic anemia and NSCLC.    She is present today with her daughter, Hailey, who is assisting with history. Pt started work up for RUL lung nodule , since then noticed to have a decrease in her Hgb to 10.7 from normal in 2023. More recently, her Hgb 9 was 6.5, she was directed to ED where she received 1U PRBC.     Patient reports increased fatigue over the last several months. She reports no bleeding, however does report dark colored stool (melena) two days ago. Denies any other evidence of melena or hematochezia.     She has previously been diagnosed with iron deficiency (years ago) requiring oral iron supplements, but never received IV iron or bone marrow biopsy.    She and her daughter are unsure of her last colonoscopy and endoscopy history, think it may have occurred >5 years ago but unsure.    She denies recent weight loss, fever, chills,  night sweats.  Previous smoker, quit 5-6 years ago, smoked 3 packs/day X 30 years. Denies alcohol or drug use. Family history significant for son with leukemia, brothers with lung cancer.     Oncology History:  2023: low dose CT chest screenin.9 cm spiculated right upper lobe pulmonary nodule concerning foe malignancy, PET/CT recommended.   2023: EBUS by Dr. Peter negative for malignancy for bronchial washing and FNA of station 10R  2023: PET/CT: Right upper lobe pulmonary nodule, more  posterior possible satellite nodules are postobstructive process measuring 2.1 cm with mSUV 14.5. Also with pretracheal region lymph node 2.6 cm.   6/29/2023: CT guided needle biopsy was non diagnostic, showed fibrosis and chronic inflammation.   8/9/2023: Lab work showed anemia with Hgb 6.5. Referred to ED. Bronchoscopy deferred.   9/6/2023: Navigational bronchoscopy performed by Dr. Clemens- Right upper lobe lung transbronchial biopsy revealed squamous cell carcinoma, lymph node at station 4R also involved with squamous cell carcinoma. PD-L1 TPS 60%. Patient not deemed a surgical candidate.    Ms. Hays presents today with her daughter, grand daughter, and great grand daughters. She is in a wheelchair. She lives alone with her grand son, able to perform her ADLs including cooking, cleaning.         The following portions of the patient's history were reviewed and updated as appropriate: allergies, current medications, past family history, past medical history, past social history, past surgical history and problem list.    Objective     Objective     Allergy:   No Known Allergies     Current Medications:   Current Outpatient Medications   Medication Sig Dispense Refill    albuterol sulfate  (90 Base) MCG/ACT inhaler Inhale 2 puffs Every 4 (Four) Hours As Needed for Wheezing or Shortness of Air. 18 g 8    apixaban (ELIQUIS) 5 MG tablet tablet Take 1 tablet by mouth Every 12 (Twelve) Hours. 180 tablet 3    atorvastatin (LIPITOR) 20 MG tablet Take 1 tablet by mouth Daily. 90 tablet 3    Budeson-Glycopyrrol-Formoterol (Breztri Aerosphere) 160-9-4.8 MCG/ACT aerosol inhaler Inhale 2 puffs 2 (Two) Times a Day. 10.7 g 8    esomeprazole (nexIUM) 20 MG capsule Take 1 capsule by mouth Daily.      ipratropium-albuterol (DUO-NEB) 0.5-2.5 mg/3 ml nebulizer USE 1 VIAL IN NEBULIZER 4 TIMES DAILY - and as needed 360 mL 11    ketorolac (TORADOL) 10 MG tablet Take 1 tablet by mouth Every 6 (Six) Hours As Needed for Moderate  Pain. 20 tablet 0    lisinopril (PRINIVIL,ZESTRIL) 5 MG tablet Take 1 tablet by mouth Daily. 90 tablet 3    loratadine (CLARITIN) 10 MG tablet Take 1 tablet by mouth Daily As Needed for Allergies.      metFORMIN (GLUCOPHAGE) 500 MG tablet Take 1 tablet by mouth 1 (One) Time As Needed (fsbg over 200).      methocarbamol (Robaxin) 500 MG tablet Take 1 tablet by mouth 3 (Three) Times a Day As Needed for Muscle Spasms. 30 tablet 2    O2 (OXYGEN) Inhale 2 L/min As Needed.      pantoprazole (PROTONIX) 40 MG EC tablet Take 1 tablet by mouth Daily As Needed (acid reflux).      potassium chloride (K-DUR,KLOR-CON) 20 MEQ CR tablet Take 1 tablet by mouth Daily.      spironolactone (ALDACTONE) 25 MG tablet Take 1 tablet by mouth Daily. 90 tablet 3    Stool Softener 100 MG capsule Take 1 capsule by mouth 2 (two) times a day.      topiramate (TOPAMAX) 25 MG tablet Take 1 tablet by mouth 2 (Two) Times a Day.      trimethoprim (TRIMPEX) 100 MG tablet Take 1 tablet by mouth 2 (Two) Times a Day. 30 tablet 1     No current facility-administered medications for this visit.       Past Medical History:  Past Medical History:   Diagnosis Date    Abnormal ECG     Arthritis     CHF (congestive heart failure)     Chronic kidney disease     Collapsed lung     Congenital heart disease     pt unsure of this    COPD (chronic obstructive pulmonary disease)     Coronary artery disease     Diabetes mellitus     TYPE 2    Elevated cholesterol     GERD (gastroesophageal reflux disease)     Heart valve disease     Hyperlipidemia     Hypertension     Myocardial infarction 2019    Primary cancer of right upper lobe of lung 9/12/2023    Sleep apnea     non compliant with CPAP    Stroke 2019    UTI (urinary tract infection)     Wears eyeglasses        Past Surgical History:  Past Surgical History:   Procedure Laterality Date    BRONCHOSCOPY Bilateral 02/27/2023    Procedure: BRONCHOSCOPY WITH ENDOBRONCHIAL ULTRASOUND;  Surgeon: Abdulkadir Peter MD;   Location:  [facility];  Service: Pulmonary;  Laterality: Bilateral;    BRONCHOSCOPY WITH ION ROBOTIC ASSIST N/A 2023    Procedure: BRONCHOSCOPY WITH ION ROBOT AND EBUS;  Surgeon: John Clemens MD;  Location:  [facility];  Service: Robotics - Pulmonary;  Laterality: N/A;  Ion cath #6 - 0032,  #6 - 0030, cath guide # 0077. EBUS scope removed with balloon intact.    CARDIAC CATHETERIZATION N/A 2017    Procedure: Left Heart Cath;  Surgeon: Jatinder Matias MD;  [facility]:  [facility];  Service:     CARDIAC CATHETERIZATION N/A 2021    Procedure: Left Heart Cath;  Surgeon: Tolu Steinberg MD;  [facility];  Service: Cardiology;  Laterality: N/A;    COLONOSCOPY      ENDOSCOPY      GALLBLADDER SURGERY      VENTRAL HERNIA REPAIR N/A 2020    Procedure: VENTRAL HERNIA REPAIR LAPAROSCOPIC WITH DAVINCI ROBOT;  Surgeon: Petr Velásquez MD;  Location:  [facility];  Service: DaVinci;  Laterality: N/A;       Social History:  Social History     Socioeconomic History    Marital status: Single    Number of children: 6   Tobacco Use    Smoking status: Former     Packs/day: 3.00     Years: 55.00     Pack years: 165.00     Types: Cigarettes     Start date: 4/3/1998     Quit date: 2015     Years since quittin.7    Smokeless tobacco: Never   Vaping Use    Vaping Use: Never used   Substance and Sexual Activity    Alcohol use: No    Drug use: No    Sexual activity: Never         Family History:  Family History   Problem Relation Age of Onset    Heart disease Mother         Rhianna sophia    Heart disease Father     Heart attack Father     Heart failure Father        Review of Systems:  Review of Systems   Constitutional:  Positive for fatigue.   Respiratory:  Positive for cough and shortness of breath.    All other systems reviewed and are negative.    Vital Signs:   /68   Pulse 74   Temp 97.1 °F (36.2 °C) (Temporal)   Resp 18   Wt 101 kg (223  lb 3.2 oz)   SpO2 93%   BMI 31.13 kg/m²      Physical Exam:  Physical Exam  Vitals reviewed.   Constitutional:       General: She is not in acute distress.     Appearance: Normal appearance. She is obese. She is not ill-appearing.      Comments: +hard of hearing   HENT:      Head: Normocephalic and atraumatic.      Mouth/Throat:      Mouth: Mucous membranes are moist.      Pharynx: Oropharynx is clear.   Eyes:      Conjunctiva/sclera: Conjunctivae normal.      Pupils: Pupils are equal, round, and reactive to light.   Cardiovascular:      Rate and Rhythm: Normal rate and regular rhythm.      Heart sounds: No murmur heard.  Pulmonary:      Effort: Pulmonary effort is normal. No respiratory distress.      Breath sounds: Wheezing present.   Abdominal:      General: Abdomen is flat. Bowel sounds are normal. There is no distension.      Palpations: Abdomen is soft. There is no mass.      Tenderness: There is no abdominal tenderness. There is no guarding.   Musculoskeletal:         General: No swelling. Normal range of motion.      Cervical back: Normal range of motion.   Lymphadenopathy:      Cervical: No cervical adenopathy.   Skin:     General: Skin is warm and dry.   Neurological:      General: No focal deficit present.      Mental Status: She is alert and oriented to person, place, and time. Mental status is at baseline.   Psychiatric:         Mood and Affect: Mood normal.       PHQ-9 Score  PHQ-9 Total Score: 0     Pain Score  Vitals:    09/13/23 1118   BP: 145/68   Pulse: 74   Resp: 18   Temp: 97.1 °F (36.2 °C)   TempSrc: Temporal   SpO2: 93%   Weight: 101 kg (223 lb 3.2 oz)   PainSc: 0-No pain       ECOG score: 1           PAINSCOREQUALITYMETRIC:   Vitals:    09/13/23 1118   PainSc: 0-No pain          Lab Review  Lab Results   Component Value Date    WBC 7.91 09/13/2023    HGB 10.1 (L) 09/13/2023    HCT 34.9 09/13/2023    MCV 91.6 09/13/2023    RDW 16.2 (H) 09/13/2023     09/13/2023    NEUTRORELPCT 63.9  09/13/2023    LYMPHORELPCT 20.9 09/13/2023    MONORELPCT 7.5 09/13/2023    EOSRELPCT 6.3 (H) 09/13/2023    BASORELPCT 0.9 09/13/2023    NEUTROABS 5.06 09/13/2023    LYMPHSABS 1.65 09/13/2023     Lab Results   Component Value Date     09/05/2023    K 4.0 09/05/2023    CO2 23.9 09/05/2023     09/05/2023    BUN 17 09/05/2023    CREATININE 1.10 (H) 09/05/2023    EGFRIFNONA 51 (L) 11/22/2021    GLUCOSE 194 (H) 09/05/2023    CALCIUM 9.6 09/05/2023    ALKPHOS 133 (H) 09/05/2023    AST 11 09/05/2023    ALT 9 09/05/2023    BILITOT <0.2 09/05/2023    ALBUMIN 4.0 09/05/2023    PROTEINTOT 7.0 09/05/2023    MG 2.3 05/18/2020    PHOS 3.5 05/18/2020     Lab Results   Component Value Date    RETICCTPCT 4.26 (H) 08/16/2023     Lab Results   Component Value Date    FERRITIN 81.03 08/16/2023    IRON 37 08/14/2023    TIBC 435 08/14/2023    LABIRON 9 (L) 08/14/2023    BIRBRKEG88 548 08/14/2023    FOLATE 12.90 08/14/2023        Radiology Results  CT Chest Without Contrast Diagnostic (08/09/2023 12:38)       NM PET/CT Skull Base to Mid Thigh (06/20/2023 11:12)         CT Chest Low Dose Cancer Screening WO (02/14/2023 12:58)   IMPRESSION:    Interval development or enlargement of a 1.9 cm spiculated right upper  lobe pulmonary nodule concerning in appearance for malignancy and PET/CT  is recommended for further evaluation.        Pathology:     Tissue Pathology Exam (09/06/2023 08:13)         6/29/23           Assessment / Plan         Assessment and Plan   Mayra Hays is a 78 y.o. year old presents for       Non small cell lung cancer   Cancer Staging   Stage IIIA (cT1c, cN2, cM0)  -Oncology history as above. Patient with 1.9 cm spiculated right upper lobe nodule on low dose CT chest screen (2/2023). EBUS by Dr. Peter negative for malignancy (2/2023). PET/CT with hypermetabolism RUL 2.1 cm and pretracheal region lymph node (6/2023). CT guided biopsy negative for malignancy (6/2023). Navigational bronchoscopy with positive  RUL and  4R (paratracheal) for squamous cell (9/6/2023). PD-L1 TPS score 60%.   -Patient not deemed for surgical resection. We reviewed her staging and treatment options which include concurrent chemo-radiation followed by adjuvant immunotherapy. Chemotherapy agents to be used would include weekly carboplatin AUC2 and paclitaxel 50 mg/m2. We discussed adverse effects such as fatigue, nausea, vomiting, diarrhea, myelosuppression, neuropathy with patient. She is agreeable to therapy above. Chemo will be followed by one year of Durvalumab based on PACIFIC trial. ChemoExpert handouts were provided.  -Will refer her to Gen surgery for port placement.   -MRI brain pending to complete staging  -Will refer patient to our radiation oncology colleagues     2. Anemia; suspicious for iron deficiency anemia due to GIB  - Patient with normal H/H in our system 2/2023, with acute drop in Hgb 6/29 (10.7) to Hgb (6.5) on 8/9 requiring transfusion. Patient reports one episode of melena two days ago (she is a poor historian).   -Last colonoscopy and endoscopy are unknown; think it may have been >5 years ago  -CBC from 8/14 show Hgb 8.2, Hct 27, MCV 90. Normal WBC and platelet count. Iron studies with normal iron (37), TIBC (435), and low iron saturation (9). This is in light of recent blood transfusion. Normal folate and B12 level. Reticulocyte count noted to be elevated to 6%  -Patient is with fatigue. Denies shortness of breath (aside from baseline COPD) or chest pain. Continues to be on Eliquis for Afib  -Initial work up from consultation confirmed iron deficiency anemia, normal folate/b12 levels. No indication of hemolysis seen with normal LDH and haptoglobin. Myeloma work up has been negative with no M spike on serum protein electrophoresis and normal k/l free light chain ratio. Peripheral smear with normal total WBC without granulocytic dysplasia or blasts.  -Received Ferumoxytol 8/29/2023    3. Malignancy associated pain  -  Currently denies        Discussed possible differential diagnoses, testing, treatment, recommended non-pharmacological interventions, risks, warning signs to monitor for that would indicate need for follow-up in clinic or ER. If no improvement with these regimens or you have new or worsening symptoms follow-up. Patient verbalizes understanding and agreement with plan of care. Denies further needs or concerns.     Patient was given instructions and counseling regarding her condition and for health maintenance advised.       All questions were answered to her satisfaction.              Meds ordered during this visit  No orders of the defined types were placed in this encounter.      Visit Diagnoses    ICD-10-CM ICD-9-CM   1. Primary cancer of right upper lobe of lung  C34.11 162.3   2. Non-small cell cancer of right lung  C34.91 162.9   3. Symptomatic anemia  D64.9 285.9       Follow Up   In 4 weeks following first cycle of treatment  Check CBC, iron studies, ferritin        This document has been electronically signed by Shawna Bond MD   September 14, 2023 16:33 EDT    Dictated Utilizing Dragon Dictation: Part of this note may be an electronic transcription/translation of spoken language to printed text using the Dragon Dictation System.

## 2023-09-13 NOTE — PROGRESS NOTES
Venipuncture Blood Specimen Collection  Venipuncture performed in left arm by Leah Grimes MA with good hemostasis. Patient tolerated the procedure well without complications.   09/13/23   Leah Grimes MA

## 2023-09-14 DIAGNOSIS — K90.49 MALABSORPTION DUE TO INTOLERANCE, NOT ELSEWHERE CLASSIFIED: Primary | ICD-10-CM

## 2023-09-14 DIAGNOSIS — R91.8 LUNG MASS: ICD-10-CM

## 2023-09-14 DIAGNOSIS — C34.11 PRIMARY CANCER OF RIGHT UPPER LOBE OF LUNG: ICD-10-CM

## 2023-09-18 ENCOUNTER — TELEPHONE (OUTPATIENT)
Dept: ONCOLOGY | Facility: HOSPITAL | Age: 78
End: 2023-09-18
Payer: MEDICARE

## 2023-09-18 NOTE — TELEPHONE ENCOUNTER
Received authorization for chemo, attempted to call patient/ Hailey Friend (POA) to arrange appt for Chemo education . No answer, left voicemail.

## 2023-09-19 ENCOUNTER — PATIENT OUTREACH (OUTPATIENT)
Dept: ONCOLOGY | Facility: CLINIC | Age: 78
End: 2023-09-19
Payer: MEDICARE

## 2023-09-19 NOTE — SIGNIFICANT NOTE
Nurse Navigator called to patient on this date, and spoke with patient's daughter/POAHailey.  Introduced the role of the NN to Hailey.  Spent time in conversation discussing patient's current plan of care and in answering questions.  Assisted in making arrangements for patient's appointments for chemo education (9/20), brain MRI (9/24), and med/onc follow up OV (changed from 9/27 to 10/11).  All appointment information instructions, time/date/location discussed with daughter.  Informed daughter that radiation oncology consult appointment will be arranged sometime tomorrow and information will be communicated to daughter by phone and/or when patient comes in for her chemotherapy education appointment.  NN contact information provided and encouraged for patient/daughter/family to call with any questions or concerns.  Daughter verbalized understanding and is in agreement with patient's plan of care.

## 2023-09-20 ENCOUNTER — OFFICE VISIT (OUTPATIENT)
Dept: SURGERY | Facility: CLINIC | Age: 78
End: 2023-09-20
Payer: MEDICARE

## 2023-09-20 ENCOUNTER — OFFICE VISIT (OUTPATIENT)
Dept: ONCOLOGY | Facility: CLINIC | Age: 78
End: 2023-09-20
Payer: MEDICARE

## 2023-09-20 VITALS — WEIGHT: 223 LBS | BODY MASS INDEX: 31.22 KG/M2 | HEIGHT: 71 IN

## 2023-09-20 VITALS
RESPIRATION RATE: 18 BRPM | OXYGEN SATURATION: 95 % | TEMPERATURE: 97.1 F | HEART RATE: 66 BPM | DIASTOLIC BLOOD PRESSURE: 69 MMHG | WEIGHT: 220 LBS | BODY MASS INDEX: 30.8 KG/M2 | SYSTOLIC BLOOD PRESSURE: 124 MMHG | HEIGHT: 71 IN

## 2023-09-20 DIAGNOSIS — R91.1 PULMONARY NODULE: Primary | ICD-10-CM

## 2023-09-20 DIAGNOSIS — C34.91 NON-SMALL CELL CANCER OF RIGHT LUNG: Primary | ICD-10-CM

## 2023-09-20 LAB
FUNGUS WND CULT: NORMAL
MYCOBACTERIUM SPEC CULT: NORMAL
NIGHT BLUE STAIN TISS: NORMAL

## 2023-09-20 PROCEDURE — 1159F MED LIST DOCD IN RCRD: CPT | Performed by: SURGERY

## 2023-09-20 PROCEDURE — 99213 OFFICE O/P EST LOW 20 MIN: CPT | Performed by: SURGERY

## 2023-09-20 PROCEDURE — 1160F RVW MEDS BY RX/DR IN RCRD: CPT | Performed by: SURGERY

## 2023-09-20 RX ORDER — SODIUM CHLORIDE 0.9 % (FLUSH) 0.9 %
10 SYRINGE (ML) INJECTION AS NEEDED
OUTPATIENT
Start: 2023-09-20

## 2023-09-20 RX ORDER — SODIUM CHLORIDE 0.9 % (FLUSH) 0.9 %
10 SYRINGE (ML) INJECTION EVERY 12 HOURS SCHEDULED
OUTPATIENT
Start: 2023-09-20

## 2023-09-20 RX ORDER — ONDANSETRON HYDROCHLORIDE 8 MG/1
8 TABLET, FILM COATED ORAL EVERY 8 HOURS PRN
Qty: 30 TABLET | Refills: 1 | Status: SHIPPED | OUTPATIENT
Start: 2023-09-20

## 2023-09-20 RX ORDER — LIDOCAINE AND PRILOCAINE 25; 25 MG/G; MG/G
CREAM TOPICAL
Qty: 30 G | Refills: 1 | Status: SHIPPED | OUTPATIENT
Start: 2023-09-20

## 2023-09-20 RX ORDER — PROCHLORPERAZINE MALEATE 10 MG
10 TABLET ORAL EVERY 6 HOURS PRN
Qty: 30 TABLET | Refills: 1 | Status: SHIPPED | OUTPATIENT
Start: 2023-09-20

## 2023-09-20 RX ORDER — SODIUM CHLORIDE 9 MG/ML
40 INJECTION, SOLUTION INTRAVENOUS AS NEEDED
OUTPATIENT
Start: 2023-09-20

## 2023-09-20 NOTE — PROGRESS NOTES
Subjective   Mayra Hays is a 78 y.o. female is being seen for consultation today at the request of Niki Antony APRN    Mayra Hays is a 78 y.o. female History of Present Illness  Who have seen previously for incarcerated umbilical hernia.  Doing well in that regard but has been noted for anemia of uncertain etiology.  No noted GI blood losses.  She is on anticoagulation with Eliquis.  No need for blood transfusion reported.  She has recently undergone bronchoscopy for suspicious lesion of the lung and may have underlying malignancy.  Pathology shows lung cancer patient requires port placement.  Anemia  There has been no abdominal pain, bruising/bleeding easily, confusion, fever or palpitations.     Past Medical History:   Diagnosis Date    Abnormal ECG     Arthritis     CHF (congestive heart failure)     Chronic kidney disease     Collapsed lung     Congenital heart disease     pt unsure of this    COPD (chronic obstructive pulmonary disease)     Coronary artery disease     Diabetes mellitus     TYPE 2    Elevated cholesterol     GERD (gastroesophageal reflux disease)     Heart valve disease     Hyperlipidemia     Hypertension     Myocardial infarction 2019    Primary cancer of right upper lobe of lung 2023    Sleep apnea     non compliant with CPAP    Stroke 2019    UTI (urinary tract infection)     Wears eyeglasses        Family History   Problem Relation Age of Onset    Heart disease Mother         Rhianna sophia    Heart disease Father     Heart attack Father     Heart failure Father        Social History     Socioeconomic History    Marital status: Single    Number of children: 6   Tobacco Use    Smoking status: Former     Packs/day: 3.00     Years: 55.00     Pack years: 165.00     Types: Cigarettes     Start date: 4/3/1998     Quit date: 2015     Years since quittin.7    Smokeless tobacco: Never   Vaping Use    Vaping Use: Never used   Substance and Sexual Activity    Alcohol use: No     Drug use: No    Sexual activity: Never       Past Surgical History:   Procedure Laterality Date    BRONCHOSCOPY Bilateral 02/27/2023    Procedure: BRONCHOSCOPY WITH ENDOBRONCHIAL ULTRASOUND;  Surgeon: Abdulkadir Peter MD;  Location:  [facility];  Service: Pulmonary;  Laterality: Bilateral;    BRONCHOSCOPY WITH ION ROBOTIC ASSIST N/A 9/6/2023    Procedure: BRONCHOSCOPY WITH ION ROBOT AND EBUS;  Surgeon: John Clemens MD;  Location:  [facility];  Service: Robotics - Pulmonary;  Laterality: N/A;  Ion cath #6 - 0032,  #6 - 0030, cath guide # 0077. EBUS scope removed with balloon intact.    CARDIAC CATHETERIZATION N/A 08/22/2017    Procedure: Left Heart Cath;  Surgeon: Jatinder Matias MD;  [facility];  Service:     CARDIAC CATHETERIZATION N/A 11/22/2021    Procedure: Left Heart Cath;  Surgeon: Tolu Steinberg MD;  [facility];  Service: Cardiology;  Laterality: N/A;    COLONOSCOPY      ENDOSCOPY      GALLBLADDER SURGERY      VENTRAL HERNIA REPAIR N/A 05/14/2020    Procedure: VENTRAL HERNIA REPAIR LAPAROSCOPIC WITH DAVINCI ROBOT;  Surgeon: Petr Velásquez MD;  Location:  [facility];  Service: DaVinci;  Laterality: N/A;       Review of Systems   Constitutional:  Negative for activity change, appetite change, chills and fever.   HENT:  Negative for sore throat and trouble swallowing.    Eyes:  Negative for visual disturbance.   Respiratory:  Negative for cough and shortness of breath.    Cardiovascular:  Negative for chest pain and palpitations.   Gastrointestinal:  Negative for abdominal distention, abdominal pain, blood in stool, constipation, diarrhea, nausea and vomiting.   Endocrine: Negative for cold intolerance and heat intolerance.   Genitourinary:  Negative for dysuria.   Musculoskeletal:  Negative for joint swelling.   Skin:  Negative for color change, rash and wound.   Allergic/Immunologic: Negative for immunocompromised state.  "  Neurological:  Negative for dizziness, seizures, weakness and headaches.   Hematological:  Negative for adenopathy. Does not bruise/bleed easily.   Psychiatric/Behavioral:  Negative for agitation and confusion.        Ht 180.3 cm (71\")   Wt 101 kg (223 lb)   BMI 31.10 kg/m²   Objective   Physical Exam  Constitutional:       Appearance: She is well-developed.   HENT:      Head: Normocephalic and atraumatic.   Eyes:      Conjunctiva/sclera: Conjunctivae normal.      Pupils: Pupils are equal, round, and reactive to light.   Neck:      Thyroid: No thyromegaly.      Vascular: No JVD.      Trachea: No tracheal deviation.   Cardiovascular:      Rate and Rhythm: Normal rate and regular rhythm.      Heart sounds: No murmur heard.    No friction rub. No gallop.   Pulmonary:      Effort: Pulmonary effort is normal.      Breath sounds: Normal breath sounds.   Abdominal:      General: There is no distension.      Palpations: Abdomen is soft. There is no hepatomegaly or splenomegaly.      Tenderness: There is no abdominal tenderness.      Hernia: No hernia is present.   Musculoskeletal:         General: No deformity. Normal range of motion.      Cervical back: Neck supple.   Skin:     General: Skin is warm and dry.   Neurological:      Mental Status: She is alert and oriented to person, place, and time.             Assessment   Diagnoses and all orders for this visit:    1. Pulmonary nodule (Primary)  -     Case Request; Standing  -     sodium chloride 0.9 % flush 10 mL  -     sodium chloride 0.9 % flush 10 mL  -     sodium chloride 0.9 % infusion 40 mL  -     ceFAZolin 2000 mg IVPB in 100 mL NS (VTB)  -     Case Request    Other orders  -     Follow Anesthesia Guidelines / Protocol; Future  -     Follow Anesthesia Guidelines / Protocol; Standing  -     Verify / Perform Chlorhexidine Skin Prep; Standing  -     Verify / Perform Chlorhexidine Skin Prep if Indicated (If Not Already Completed); Standing  -     Provide NPO " Instructions to Patient; Future  -     Chlorhexidine Skin Prep; Future  -     Insert Peripheral IV; Standing  -     Saline Lock & Maintain IV Access; Standing  -     Obtain Informed Consent; Future      Mayra Hays is a 78 y.o. female with anemia of uncertain etiology.  She has recently undergone bronchoscopy with biopsy for possible lung disease that has returned indicating malignancy.  She requires chemotherapy access and port placement will be performed.

## 2023-09-20 NOTE — H&P (VIEW-ONLY)
Subjective   Mayra Hays is a 78 y.o. female is being seen for consultation today at the request of Niki Antony APRN    Mayra Hays is a 78 y.o. female History of Present Illness  Who have seen previously for incarcerated umbilical hernia.  Doing well in that regard but has been noted for anemia of uncertain etiology.  No noted GI blood losses.  She is on anticoagulation with Eliquis.  No need for blood transfusion reported.  She has recently undergone bronchoscopy for suspicious lesion of the lung and may have underlying malignancy.  Pathology shows lung cancer patient requires port placement.  Anemia  There has been no abdominal pain, bruising/bleeding easily, confusion, fever or palpitations.     Past Medical History:   Diagnosis Date    Abnormal ECG     Arthritis     CHF (congestive heart failure)     Chronic kidney disease     Collapsed lung     Congenital heart disease     pt unsure of this    COPD (chronic obstructive pulmonary disease)     Coronary artery disease     Diabetes mellitus     TYPE 2    Elevated cholesterol     GERD (gastroesophageal reflux disease)     Heart valve disease     Hyperlipidemia     Hypertension     Myocardial infarction 2019    Primary cancer of right upper lobe of lung 2023    Sleep apnea     non compliant with CPAP    Stroke 2019    UTI (urinary tract infection)     Wears eyeglasses        Family History   Problem Relation Age of Onset    Heart disease Mother         Rhianna sophia    Heart disease Father     Heart attack Father     Heart failure Father        Social History     Socioeconomic History    Marital status: Single    Number of children: 6   Tobacco Use    Smoking status: Former     Packs/day: 3.00     Years: 55.00     Pack years: 165.00     Types: Cigarettes     Start date: 4/3/1998     Quit date: 2015     Years since quittin.7    Smokeless tobacco: Never   Vaping Use    Vaping Use: Never used   Substance and Sexual Activity    Alcohol use: No     Drug use: No    Sexual activity: Never       Past Surgical History:   Procedure Laterality Date    BRONCHOSCOPY Bilateral 02/27/2023    Procedure: BRONCHOSCOPY WITH ENDOBRONCHIAL ULTRASOUND;  Surgeon: Abdulkadir Peter MD;  [facility]:  [facility];  Service: Pulmonary;  Laterality: Bilateral;    BRONCHOSCOPY WITH ION ROBOTIC ASSIST N/A 9/6/2023    Procedure: BRONCHOSCOPY WITH ION ROBOT AND EBUS;  Surgeon: John Clemens MD;  Location:  [facility];  Service: Robotics - Pulmonary;  Laterality: N/A;  Ion cath #6 - 0032,  #6 - 0030, cath guide # 0077. EBUS scope removed with balloon intact.    CARDIAC CATHETERIZATION N/A 08/22/2017    Procedure: Left Heart Cath;  Surgeon: Jatinder Matias MD;  [facility];  Service:     CARDIAC CATHETERIZATION N/A 11/22/2021    Procedure: Left Heart Cath;  Surgeon: Tolu Steinberg MD;  [facility];  Service: Cardiology;  Laterality: N/A;    COLONOSCOPY      ENDOSCOPY      GALLBLADDER SURGERY      VENTRAL HERNIA REPAIR N/A 05/14/2020    Procedure: VENTRAL HERNIA REPAIR LAPAROSCOPIC WITH DAVINCI ROBOT;  Surgeon: ePtr Velásquez MD;  Location:  [facility];  Service: DaVinci;  Laterality: N/A;       Review of Systems   Constitutional:  Negative for activity change, appetite change, chills and fever.   HENT:  Negative for sore throat and trouble swallowing.    Eyes:  Negative for visual disturbance.   Respiratory:  Negative for cough and shortness of breath.    Cardiovascular:  Negative for chest pain and palpitations.   Gastrointestinal:  Negative for abdominal distention, abdominal pain, blood in stool, constipation, diarrhea, nausea and vomiting.   Endocrine: Negative for cold intolerance and heat intolerance.   Genitourinary:  Negative for dysuria.   Musculoskeletal:  Negative for joint swelling.   Skin:  Negative for color change, rash and wound.   Allergic/Immunologic: Negative for immunocompromised state.  "  Neurological:  Negative for dizziness, seizures, weakness and headaches.   Hematological:  Negative for adenopathy. Does not bruise/bleed easily.   Psychiatric/Behavioral:  Negative for agitation and confusion.        Ht 180.3 cm (71\")   Wt 101 kg (223 lb)   BMI 31.10 kg/m²   Objective   Physical Exam  Constitutional:       Appearance: She is well-developed.   HENT:      Head: Normocephalic and atraumatic.   Eyes:      Conjunctiva/sclera: Conjunctivae normal.      Pupils: Pupils are equal, round, and reactive to light.   Neck:      Thyroid: No thyromegaly.      Vascular: No JVD.      Trachea: No tracheal deviation.   Cardiovascular:      Rate and Rhythm: Normal rate and regular rhythm.      Heart sounds: No murmur heard.    No friction rub. No gallop.   Pulmonary:      Effort: Pulmonary effort is normal.      Breath sounds: Normal breath sounds.   Abdominal:      General: There is no distension.      Palpations: Abdomen is soft. There is no hepatomegaly or splenomegaly.      Tenderness: There is no abdominal tenderness.      Hernia: No hernia is present.   Musculoskeletal:         General: No deformity. Normal range of motion.      Cervical back: Neck supple.   Skin:     General: Skin is warm and dry.   Neurological:      Mental Status: She is alert and oriented to person, place, and time.             Assessment   Diagnoses and all orders for this visit:    1. Pulmonary nodule (Primary)  -     Case Request; Standing  -     sodium chloride 0.9 % flush 10 mL  -     sodium chloride 0.9 % flush 10 mL  -     sodium chloride 0.9 % infusion 40 mL  -     ceFAZolin 2000 mg IVPB in 100 mL NS (VTB)  -     Case Request    Other orders  -     Follow Anesthesia Guidelines / Protocol; Future  -     Follow Anesthesia Guidelines / Protocol; Standing  -     Verify / Perform Chlorhexidine Skin Prep; Standing  -     Verify / Perform Chlorhexidine Skin Prep if Indicated (If Not Already Completed); Standing  -     Provide NPO " Instructions to Patient; Future  -     Chlorhexidine Skin Prep; Future  -     Insert Peripheral IV; Standing  -     Saline Lock & Maintain IV Access; Standing  -     Obtain Informed Consent; Future      Mayra Hays is a 78 y.o. female with anemia of uncertain etiology.  She has recently undergone bronchoscopy with biopsy for possible lung disease that has returned indicating malignancy.  She requires chemotherapy access and port placement will be performed.

## 2023-09-20 NOTE — PROGRESS NOTES
CHEMOTHERAPY PREPARATION    Mayra Hays  [id number]  1945    Chief Complaint: Chemotherapy Education    History of present illness:  Mayra Hays is a 78 y.o. year old female who is here today for chemotherapy preparation and needs assessment. The patient has been diagnosed with non-small cell lung cancer and is scheduled to begin treatment with qweekly Carboplatin/Paclitaxel. She is scheduled to see radiation oncology and have CT simulation on 09/28/23. She had port-a-cath consult today. She is without specific complaints.     Oncology History:    Oncology/Hematology History   Primary cancer of right upper lobe of lung   9/12/2023 Initial Diagnosis    Primary cancer of right upper lobe of lung     9/12/2023 Cancer Staged    Staging form: Lung, AJCC 8th Edition  - Clinical stage from 9/12/2023: Stage IIIA (cT1c, cN2, cM0) - Signed by Shawna Bond MD on 9/12/2023 9/13/2023 -  Chemotherapy    OP LUNG PACLitaxel / CARBOplatin AUC=2 (Weekly) + XRT        11/2/2023 -  Chemotherapy    OP LUNG Durvalumab       Non-small cell cancer of right lung   9/13/2023 Initial Diagnosis    Non-small cell cancer of right lung     9/13/2023 -  Chemotherapy    OP LUNG PACLitaxel / CARBOplatin AUC=2 (Weekly) + XRT        11/2/2023 -  Chemotherapy    OP LUNG Durvalumab         Past Medical History:   Diagnosis Date    Abnormal ECG     Arthritis     CHF (congestive heart failure)     Chronic kidney disease     Collapsed lung     Congenital heart disease     pt unsure of this    COPD (chronic obstructive pulmonary disease)     Coronary artery disease     Diabetes mellitus     TYPE 2    Elevated cholesterol     GERD (gastroesophageal reflux disease)     Heart valve disease     Hyperlipidemia     Hypertension     Myocardial infarction 2019    Primary cancer of right upper lobe of lung 9/12/2023    Sleep apnea     non compliant with CPAP    Stroke 2019    UTI (urinary tract infection)     Wears eyeglasses      Past Surgical  History:   Procedure Laterality Date    BRONCHOSCOPY Bilateral 02/27/2023    Procedure: BRONCHOSCOPY WITH ENDOBRONCHIAL ULTRASOUND;  Surgeon: Abdulkadir Peter MD;  Location:  [facility];  Service: Pulmonary;  Laterality: Bilateral;    BRONCHOSCOPY WITH ION ROBOTIC ASSIST N/A 9/6/2023    Procedure: BRONCHOSCOPY WITH ION ROBOT AND EBUS;  Surgeon: John Clemens MD;  Location:  [facility];  Service: Robotics - Pulmonary;  Laterality: N/A;  Ion cath #6 - 0032,  #6 - 0030, cath guide # 0077. EBUS scope removed with balloon intact.    CARDIAC CATHETERIZATION N/A 08/22/2017    Procedure: Left Heart Cath;  Surgeon: Jatinder Matias MD;  [facility];  Service:     CARDIAC CATHETERIZATION N/A 11/22/2021    Procedure: Left Heart Cath;  Surgeon: Tolu Steinberg MD;  [facility];  Service: Cardiology;  Laterality: N/A;    COLONOSCOPY      ENDOSCOPY      GALLBLADDER SURGERY      VENTRAL HERNIA REPAIR N/A 05/14/2020    Procedure: VENTRAL HERNIA REPAIR LAPAROSCOPIC WITH DAVINCI ROBOT;  Surgeon: Petr Velásquez MD;  Location:  [facility];  Service: DaVinci;  Laterality: N/A;     Family History   Problem Relation Age of Onset    Heart disease Mother         Rhianna sophia    Heart disease Father     Heart attack Father     Heart failure Father        Medications: The current medication list was reviewed and reconciled.     Allergies:  has No Known Allergies.  Review of Systems:  A comprehensive 14 point review of systems was conducted with patient and positive as per HPI otherwise negative.    Physical Exam:    Vitals:    09/20/23 1420   BP: 124/69   Pulse: 66   Resp: 18   Temp: 97.1 °F (36.2 °C)   SpO2: 95%     Vitals:    09/20/23 1420   PainSc: 0-No pain        ECOG: (3) Capable of Limited Self Care, Confined to Bed or Chair Greater Than 50% of Waking Hours    General/Constitutional: Awake, alert and oriented. No apparent acute distress is noted.  HEENT:  Normocephalic, atraumatic. PERRLA, conjunctiva normal. Extraocular movements are intact.  Neck: No JVD, thyromegaly or cervical lymphadenopathy.  Cardiovascular: S1, S2. Regular rate and rhythm, no murmurs, rubs or gallops.  Respiratory: Pulmonary effort is normal, lungs are clear to auscultation bilaterally. No wheezing, rhonchi or rales. No use of accessory muscles, no retractions.  Abdomen: Soft, non-tender, non-distended with normoactive bowel sounds x 4 quadrants. No palpable hepatosplenomegaly.  Lymph: No cervical, supraclavicular, axillary, inguinal or femoral adenopathy.  Integumentary: Skin warm and dry. No bruising, ecchymosis.  Extremities: No clubbing, cyanosis or edema.  Neurological: Alert and oriented x 3. Grossly non-focal examination.            NEEDS ASSESSMENTS    Genetics  The patient's new diagnosis and family history have been reviewed for genetic counseling needs. A genetic referral is not recommended.     Barriers to care  A barriers form was also completed by the patient today. We discussed services offered by our facility to help her have adequate access to care. The patient was given the name and card for our Oncology Social Worker, Candace Castellon. Based upon barriers assessment today, the patient will not require a follow-up call from the  to further discuss needs.     VAD Assessment  The patient and I discussed planned intervenous chemotherapy as well as other IV treatments that are often needed throughout the course of treatment. These may include, but are not limited to blood transfusions, antibiotics, and IV hydration. The vasculature does not appear to be adequate for multiple peripheral IVs throughout their treatment course. Discussed risks and benefits of VADs. The patient would like to pursue Port-A-Cath insertion prior to initiation of treatment. Port-a-cath placement is scheduled with Dr. Velásquez for 09/29/2023.    Advanced Care Planning  The patient and I discussed  "advanced care planning, \"Conversations that Matter\".   This service was offered, free of charge, for development of advance directives with a certified ACP facilitator.  The patient does not have an up-to-date advanced directive. This document is not on file with our office. The patient is not interested in an appointment with one of our facilitators to create or update their advanced directives.      Palliative Care  The patient and I discussed palliative care services. Palliative care is not the same as Hospice care. This is specialized medical care for people living with serious illness with the goal of improving quality of life for the patient and their family. [facility] has partnered with [facility] to offer our patients outpatient palliative care early along with their treatment to assist in coordination of care, symptom management, pain management, and medical decision making.  Oncology criteria for palliative care referral is not met at this time. The patient is not interested in a palliative care consultation.     Additional Referral needs  none      CHEMOTHERAPY EDUCATION    Booklets Given: Chemotherapy and You [x]  Eating Hints [x]    Sexuality/Fertility Books []      Chemotherapy/Biotherapy Education Sheets: (list all that apply)  nausea management, acid reflux management, diarrhea management, Cancer resourse contacts information, skin and mouth care, and vaccination information                                                                                                                                                                 Chemotherapy Regimen:   Treatment Plans       Name Type Plan Dates Plan Provider         Active    OP SUPPORTIVE Ferumoxytol 510 mg ONCOLOGY SUPPORTIVE CARE 1  8/29/2023 - Present Shawna Bond MD     OP LUNG PACLitaxel / CARBOplatin AUC=2 (Weekly) + XRT  ONCOLOGY TREATMENT  9/12/2023 - Present Shawna Bond MD                      TOPICS EDUCATION " PROVIDED COMMENTS   ANEMIA:  role of RBC, cause, s/s, ways to manage, role of transfusion [x]    THROMBOCYTOPENIA:  role of platelet, cause, s/s, ways to prevent bleeding, things to avoid, when to seek help [x]    NEUTROPENIA:  role of WBC, cause, infection precautions, s/s of infection, when to call MD [x]    NUTRITION & APPETITE CHANGES:  importance of maintaining healthy diet & weight, ways to manage to improve intake, dietary consult, exercise regimen [x]    DIARRHEA:  causes, s/s of dehydration, ways to manage, dietary changes, when to call MD [x]    CONSTIPATION:  causes, ways to manage, dietary changes, when to call MD [x]    NAUSEA & VOMITING:  cause, use of antiemetics, dietary changes, when to call MD [x]    MOUTH SORES:  causes, oral care, ways to manage [x]    ALOPECIA:  cause, ways to manage, resources [x]    INFERTILITY & SEXUALITY:  causes, fertility preservation options, sexuality changes, ways to manage, importance of birth control [x]    NERVOUS SYSTEM CHANGES:  causes, s/s, neuropathies, cognitive changes, ways to manage [x]    PAIN:  causes, ways to manage [x]    SKIN & NAIL CHANGES:  cause, s/s, ways to manage [x]    ORGAN TOXICITIES:  cause, s/s, need for diagnostic tests, labs, when to notify MD [x]    SURVIVORSHIP:  distress, distress assessment, secondary malignancies, early/late effects, follow-up, social issues, social support [x]    HOME CARE:  use of spill kits, storing of PO chemo, how to manage bodily fluids [x]    MISCELLANEOUS:  drug interactions, administration, vesicant, et [x]      Assessment and Plan:    Diagnoses and all orders for this visit:    1. Non-small cell cancer of right lung (Primary)    Other orders  -     ondansetron (Zofran) 8 MG tablet; Take 1 tablet by mouth Every 8 (Eight) Hours As Needed for Nausea or Vomiting.  Dispense: 30 tablet; Refill: 1  -     prochlorperazine (COMPAZINE) 10 MG tablet; Take 1 tablet by mouth Every 6 (Six) Hours As Needed for Nausea or  Vomiting.  Dispense: 30 tablet; Refill: 1  -     lidocaine-prilocaine (EMLA) 2.5-2.5 % cream; Apply to port-a-cath site 30 minutes prior to arrival at infusion center. Cover with plastic wrap.  Dispense: 30 g; Refill: 1      - Chemotherapy teaching was also completed today as documented above. Adequate time was given to answer all questions to her satisfaction. Patient and family are aware of their care team members and contact information if they have questions or problems throughout the treatment course. Needs assessments and education has been completed. The patient is adequately prepared to begin treatment as scheduled.   - Reviewed with patient education regarding EMLA cream, Compazine, and Zofran and prescriptions were sent to the pharmacy of patient's choice.  - I advised the patient that she can take Tylenol or Ibuprofen as needed for aches/pains related to cancer/treatment. I also advised patient she could use Senakot or Miralax as needed for constipation or Imodium as needed for diarrhea.    - I reviewed with the patient the care team members. I also reviewed the option of the urgent care clinic through our oncology office for evaluation and management of symptoms related to treatment. Patient was provided with phone number to call during regular office hours [phone] press #1 and for treatment related questions call [phone] to speak to a nurse. If after hours or on the weekend call [phone] and ask to page the MD on call for [facility] Oncology services.   - Consent for treatment with carboplatin/paclitaxel as recommended by Dr. Bond for the treatment of lung cancer was signed by the patient's daughter and power of  today.         I spent 60 minutes with Mayra Hays today.  In the office today, more than 50% of this time was spent face-to-face with her  in counseling / coordination of care, reviewing her interim medical history and counseling on the current  treatment plan.  All questions were answered to her satisfaction.           Electronically Signed by: ERASTO Lundberg , September 20, 2023 15:02 EDT

## 2023-09-21 DIAGNOSIS — C34.11 PRIMARY CANCER OF RIGHT UPPER LOBE OF LUNG: ICD-10-CM

## 2023-09-21 DIAGNOSIS — C34.91 NON-SMALL CELL CANCER OF RIGHT LUNG: Primary | ICD-10-CM

## 2023-09-21 RX ORDER — FAMOTIDINE 10 MG/ML
20 INJECTION, SOLUTION INTRAVENOUS AS NEEDED
OUTPATIENT
Start: 2023-09-21

## 2023-09-21 RX ORDER — DIPHENHYDRAMINE HYDROCHLORIDE 50 MG/ML
50 INJECTION INTRAMUSCULAR; INTRAVENOUS AS NEEDED
OUTPATIENT
Start: 2023-09-21

## 2023-09-21 RX ORDER — SODIUM CHLORIDE 9 MG/ML
250 INJECTION, SOLUTION INTRAVENOUS ONCE
OUTPATIENT
Start: 2023-09-21

## 2023-09-21 RX ORDER — FAMOTIDINE 10 MG/ML
20 INJECTION, SOLUTION INTRAVENOUS ONCE
OUTPATIENT
Start: 2023-09-21

## 2023-09-21 RX ORDER — PALONOSETRON 0.05 MG/ML
0.25 INJECTION, SOLUTION INTRAVENOUS ONCE
OUTPATIENT
Start: 2023-09-21

## 2023-09-22 ENCOUNTER — DOCUMENTATION (OUTPATIENT)
Dept: SURGERY | Facility: CLINIC | Age: 78
End: 2023-09-22
Payer: MEDICARE

## 2023-09-24 ENCOUNTER — HOSPITAL ENCOUNTER (OUTPATIENT)
Dept: MRI IMAGING | Facility: HOSPITAL | Age: 78
Discharge: HOME | End: 2023-09-24
Admitting: INTERNAL MEDICINE

## 2023-09-24 DIAGNOSIS — R91.1 PULMONARY NODULE: ICD-10-CM

## 2023-09-24 LAB — CREAT BLDA-MCNC: 0.9 MG/DL (ref 0.6–1.3)

## 2023-09-24 PROCEDURE — 82565 ASSAY OF CREATININE: CPT

## 2023-09-24 PROCEDURE — 0 GADOBENATE DIMEGLUMINE 529 MG/ML SOLUTION: Performed by: INTERNAL MEDICINE

## 2023-09-24 PROCEDURE — 70553 MRI BRAIN STEM W/O & W/DYE: CPT | Performed by: RADIOLOGY

## 2023-09-24 PROCEDURE — 70553 MRI BRAIN STEM W/O & W/DYE: CPT

## 2023-09-24 PROCEDURE — A9577 INJ MULTIHANCE: HCPCS | Performed by: INTERNAL MEDICINE

## 2023-09-24 RX ADMIN — GADOBENATE DIMEGLUMINE 20 ML: 529 INJECTION, SOLUTION INTRAVENOUS at 10:25

## 2023-09-27 ENCOUNTER — PATIENT OUTREACH (OUTPATIENT)
Dept: ONCOLOGY | Facility: CLINIC | Age: 78
End: 2023-09-27
Payer: MEDICARE

## 2023-09-27 LAB
FUNGUS WND CULT: NORMAL
MYCOBACTERIUM SPEC CULT: NORMAL
NIGHT BLUE STAIN TISS: NORMAL

## 2023-09-28 ENCOUNTER — HOSPITAL ENCOUNTER (OUTPATIENT)
Dept: RADIATION ONCOLOGY | Facility: HOSPITAL | Age: 78
Discharge: HOME | End: 2023-09-28
Payer: MEDICARE

## 2023-09-28 ENCOUNTER — CONSULT (OUTPATIENT)
Dept: RADIATION ONCOLOGY | Facility: HOSPITAL | Age: 78
End: 2023-09-28
Payer: MEDICARE

## 2023-09-28 ENCOUNTER — HOSPITAL ENCOUNTER (OUTPATIENT)
Dept: RADIATION ONCOLOGY | Facility: HOSPITAL | Age: 78
Setting detail: RADIATION/ONCOLOGY SERIES
End: 2023-09-28
Payer: MEDICARE

## 2023-09-28 VITALS
WEIGHT: 226.4 LBS | TEMPERATURE: 96.6 F | DIASTOLIC BLOOD PRESSURE: 74 MMHG | OXYGEN SATURATION: 95 % | HEIGHT: 71 IN | HEART RATE: 67 BPM | BODY MASS INDEX: 31.69 KG/M2 | SYSTOLIC BLOOD PRESSURE: 134 MMHG | RESPIRATION RATE: 18 BRPM

## 2023-09-28 DIAGNOSIS — C34.91 NON-SMALL CELL CANCER OF RIGHT LUNG: Primary | ICD-10-CM

## 2023-09-28 PROCEDURE — 77470 SPECIAL RADIATION TREATMENT: CPT | Performed by: RADIOLOGY

## 2023-09-28 PROCEDURE — 77332 RADIATION TREATMENT AID(S): CPT | Performed by: RADIOLOGY

## 2023-09-28 PROCEDURE — 77334 RADIATION TREATMENT AID(S): CPT | Performed by: RADIOLOGY

## 2023-09-28 PROCEDURE — G0463 HOSPITAL OUTPT CLINIC VISIT: HCPCS | Performed by: RADIOLOGY

## 2023-09-28 NOTE — PROGRESS NOTES
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  Evaluate for concurrent chest radiotherapy and chemotherapy.    History of Present Illness:   Mrs. Mayra Hays is a 78-year-old white female with a long history of smoking.  The patient was noted to have a 1.9 cm spiculated right upper lobe pulmonary nodule on low-dose CT scan of chest obtained on 2023.  The nodule was not present on a prior CT scan of chest obtained on 2021.    The patient reported occasional cough productive of clear sputum.  She also reported dyspnea on exertion.  She denied chest pains, hemoptysis, hoarseness, swallowing issues, recent upper respiratory tract infections, and nonintentional weight loss.    A PET CT scan (2023) showed interval enlargement of the right upper lung nodule to 2.1 cm with a max SUV of 14.5.  A pretracheal lymph node measuring 2.6 cm with max SUV 10.6 was also noted.    The patient underwent an attempt at a CT directed needle biopsy of the right lung mass at the [facility] on 2023.  Biopsies revealed lung tissue with fibrosis and chronic inflammation with no malignant tumor or granuloma identified.    Flexible fiberoptic bronchoscopy was performed by Dr. John Clemens on 2023.  The upper airways revealed diffuse chronic bronchitic changes.  Heaped up abnormal appearing tissue was present in the airway at the right upper lung mass.  Biopsies of the mass and bronchial brushings revealed squamous cell carcinoma.  Biopsies of the subcarinal, right hilar, and right lower paratracheal lymph nodes were negative for malignancy.    The patient's work-up has included a cranial MRI scan (2023) which was negative for metastases.    Mrs. Hays is not considered  to be an operative candidate.  The patient has discussed chemotherapy options with Dr. Bond.  Concurrent chest radiotherapy with carboplatin and paclitaxel is planned.  The patient will undergo Fynhar-p-Gddy placement on 09/29/2023.    The patient presents to our service to discuss radiotherapy options in detail.      Subjective      Review of Systems:   Constitutional: Positive for fatigue and diminished appetite.  HEENT; positive for diminished vision  Cardiovascular: Negative  Pulmonary: Positive for occasional cough productive of clear sputum.  Negative for hoarseness, chest pains, odynophagia, hemoptysis, and nonintentional weight loss  Gastrointestinal: Negative  Genitourinary: Positive for urinary urgency  Integumentary: Negative  Musculoskeletal: Negative  Endocrine: Positive for type 2 diabetes  Hematopoietic: Positive for anemia  Neurologic: Negative  Psychiatric: Negative    Past Oncology History:   Oncology/Hematology History   Primary cancer of right upper lobe of lung   9/12/2023 Initial Diagnosis    Primary cancer of right upper lobe of lung     9/12/2023 Cancer Staged    Staging form: Lung, AJCC 8th Edition  - Clinical stage from 9/12/2023: Stage IIIA (cT1c, cN2, cM0) - Signed by Shawna Bond MD on 9/12/2023 9/13/2023 -  Chemotherapy    OP LUNG PACLitaxel / CARBOplatin AUC=2 (Weekly) + XRT        11/2/2023 -  Chemotherapy    OP LUNG Durvalumab       Non-small cell cancer of right lung   9/13/2023 Initial Diagnosis    Non-small cell cancer of right lung     9/13/2023 -  Chemotherapy    OP LUNG PACLitaxel / CARBOplatin AUC=2 (Weekly) + XRT        11/2/2023 -  Chemotherapy    OP LUNG Durvalumab            Past Radiation History:  No previous exposures to ionizing radiation therapy and there are not relative contraindications including connective tissue disorder.     Past Medical History:   Past Medical History:   Diagnosis Date    Abnormal ECG     Arthritis     CHF (congestive heart  failure)     Chronic kidney disease     Collapsed lung     Congenital heart disease     pt unsure of this    COPD (chronic obstructive pulmonary disease)     Coronary artery disease     Diabetes mellitus     TYPE 2    Elevated cholesterol     GERD (gastroesophageal reflux disease)     Heart valve disease     Hyperlipidemia     Hypertension     Myocardial infarction 2019    Primary cancer of right upper lobe of lung 9/12/2023    Sleep apnea     non compliant with CPAP    Stroke 2019    UTI (urinary tract infection)     Wears eyeglasses        Past Surgical History:   Past Surgical History:   Procedure Laterality Date    BRONCHOSCOPY Bilateral 02/27/2023    Procedure: BRONCHOSCOPY WITH ENDOBRONCHIAL ULTRASOUND;  Surgeon: Abdulkadir Peter MD;  Location: [facility] OR;  Service: Pulmonary;  Laterality: Bilateral;    BRONCHOSCOPY WITH ION ROBOTIC ASSIST N/A 9/6/2023    Procedure: BRONCHOSCOPY WITH ION ROBOT AND EBUS;  Surgeon: John Clemens MD;  Location:  [facility];  Service: Robotics - Pulmonary;  Laterality: N/A;  Ion cath #6 - 0032,  #6 - 0030, cath guide # 0077. EBUS scope removed with balloon intact.    CARDIAC CATHETERIZATION N/A 08/22/2017    Procedure: Left Heart Cath;  Surgeon: Jatinder Matias MD;  Location: [facility] CATH INVASIVE LOCATION;  Service:     CARDIAC CATHETERIZATION N/A 11/22/2021    Procedure: Left Heart Cath;  Surgeon: Tolu Steinberg MD;  Location: [facility] CATH INVASIVE LOCATION;  Service: Cardiology;  Laterality: N/A;    COLONOSCOPY      ENDOSCOPY      GALLBLADDER SURGERY      VENTRAL HERNIA REPAIR N/A 05/14/2020    Procedure: VENTRAL HERNIA REPAIR LAPAROSCOPIC WITH DAVINCI ROBOT;  Surgeon: Petr Velásquez MD;  Location: [facility] OR;  Service: DaVinci;  Laterality: N/A;       Family History:   Family History   Problem Relation Age of Onset    Heart disease Mother         Rhianna sophia    Heart disease Father     Heart attack Father     Heart failure Father        Social History:    Social History     Socioeconomic History    Marital status: Single    Number of children: 6   Tobacco Use    Smoking status: Former     Packs/day: 3.00     Years: 55.00     Pack years: 165.00     Types: Cigarettes     Start date: 4/3/1998     Quit date: 2015     Years since quittin.7    Smokeless tobacco: Never   Vaping Use    Vaping Use: Never used   Substance and Sexual Activity    Alcohol use: No    Drug use: No    Sexual activity: Never     The patient resides at [place].  . Six adult offspring-living and apparently healthy.  Education: Eighth grade.  No history of  service.  Occupation homemaker.  The patient smokes 2 to 3 packs of cigarettes per day x52 years, quit smoking approximately 10 years ago.  Nondrinker.    Medications:     Current Outpatient Medications:     albuterol sulfate  (90 Base) MCG/ACT inhaler, Inhale 2 puffs Every 4 (Four) Hours As Needed for Wheezing or Shortness of Air., Disp: 18 g, Rfl: 8    apixaban (ELIQUIS) 5 MG tablet tablet, Take 1 tablet by mouth Every 12 (Twelve) Hours., Disp: 180 tablet, Rfl: 3    atorvastatin (LIPITOR) 20 MG tablet, Take 1 tablet by mouth Daily., Disp: 90 tablet, Rfl: 3    Budeson-Glycopyrrol-Formoterol (Breztri Aerosphere) 160-9-4.8 MCG/ACT aerosol inhaler, Inhale 2 puffs 2 (Two) Times a Day., Disp: 10.7 g, Rfl: 8    esomeprazole (nexIUM) 20 MG capsule, Take 1 capsule by mouth Daily., Disp: , Rfl:     ipratropium-albuterol (DUO-NEB) 0.5-2.5 mg/3 ml nebulizer, USE 1 VIAL IN NEBULIZER 4 TIMES DAILY - and as needed, Disp: 360 mL, Rfl: 11    ketorolac (TORADOL) 10 MG tablet, Take 1 tablet by mouth Every 6 (Six) Hours As Needed for Moderate Pain., Disp: 20 tablet, Rfl: 0    lidocaine-prilocaine (EMLA) 2.5-2.5 % cream, Apply to port-a-cath site 30 minutes prior to arrival at infusion center. Cover with plastic wrap., Disp: 30 g, Rfl: 1    lisinopril (PRINIVIL,ZESTRIL) 5 MG tablet, Take 1 tablet by mouth Daily., Disp: 90 tablet,  Rfl: 3    loratadine (CLARITIN) 10 MG tablet, Take 1 tablet by mouth Daily As Needed for Allergies., Disp: , Rfl:     metFORMIN (GLUCOPHAGE) 500 MG tablet, Take 1 tablet by mouth 1 (One) Time As Needed (fsbg over 200)., Disp: , Rfl:     methocarbamol (Robaxin) 500 MG tablet, Take 1 tablet by mouth 3 (Three) Times a Day As Needed for Muscle Spasms., Disp: 30 tablet, Rfl: 2    O2 (OXYGEN), Inhale 2 L/min As Needed., Disp: , Rfl:     ondansetron (Zofran) 8 MG tablet, Take 1 tablet by mouth Every 8 (Eight) Hours As Needed for Nausea or Vomiting., Disp: 30 tablet, Rfl: 1    pantoprazole (PROTONIX) 40 MG EC tablet, Take 1 tablet by mouth Daily As Needed (acid reflux)., Disp: , Rfl:     potassium chloride (K-DUR,KLOR-CON) 20 MEQ CR tablet, Take 1 tablet by mouth Daily., Disp: , Rfl:     prochlorperazine (COMPAZINE) 10 MG tablet, Take 1 tablet by mouth Every 6 (Six) Hours As Needed for Nausea or Vomiting., Disp: 30 tablet, Rfl: 1    Stool Softener 100 MG capsule, Take 1 capsule by mouth 2 (two) times a day., Disp: , Rfl:     trimethoprim (TRIMPEX) 100 MG tablet, Take 1 tablet by mouth 2 (Two) Times a Day., Disp: 30 tablet, Rfl: 1    spironolactone (ALDACTONE) 25 MG tablet, Take 1 tablet by mouth Daily. (Patient not taking: Reported on 9/28/2023), Disp: 90 tablet, Rfl: 3    topiramate (TOPAMAX) 25 MG tablet, Take 1 tablet by mouth 2 (Two) Times a Day. (Patient not taking: Reported on 9/28/2023), Disp: , Rfl:     Allergies:   No Known Allergies    PHQ-9 Depression Screening  Little interest or pleasure in doing things?     Feeling down, depressed, or hopeless?     Trouble falling or staying asleep, or sleeping too much?     Feeling tired or having little energy?     Poor appetite or overeating?     Feeling bad about yourself - or that you are a failure or have let yourself or your family down?     Trouble concentrating on things, such as reading the newspaper or watching television?     Moving or speaking so slowly that other  "people could have noticed? Or the opposite - being so fidgety or restless that you have been moving around a lot more than usual?     Thoughts that you would be better off dead, or of hurting yourself in some way?     PHQ-9 Total Score     If you checked off any problems, how difficult have these problems made it for you to do your work, take care of things at home, or get along with other people?        Distress Screenin     KPS: 90 %     Imaging:  CT Chest Without Contrast Diagnostic    Result Date: 2023  1. 2.6 cm spiculated right upper lobe mass. 2. Abnormally enlarged mediastinal lymph node.    This report was finalized on 2023 12:59 PM by Dr. Mauricio Santiago MD.      MRI Brain With & Without Contrast    Result Date: 2023  1.  No acute intracranial findings. 2.  No pathologic contrast enhancement. No enhancing brain lesions. 3.  Mild senescent changes.   This report was finalized on 2023 7:38 PM by Dr. Kunal Castanon MD.      XR Chest 1 View    Result Date: 2023  Impression: 1. No pneumothorax following right lung biopsy. 2. Cardiomegaly. 3. Left basilar atelectasis. Electronically Signed: John Irwin MD  2023 9:13 AM EDT  Workstation ID: [id number]    XR Chest 1 View    Result Date: 2023  Impression: 1. No evidence of pneumothorax status post right upper lobe biopsy. 2. Cardiomegaly with bandlike retrocardiac airspace opacity, likely atelectasis. Electronically Signed: Bhanu Clarke  2023 4:18 PM EDT  Workstation ID: [id number]      Pathology:  Described above    Objective     Physical Exam:  The patient is a well-nourished, well-developed elderly white female in no acute distress.  Vital signs as below.  KPS 90.    Vital Signs:   Vitals:    23 0834   BP: 134/74   Pulse: 67   Resp: 18   Temp: 96.6 °F (35.9 °C)   TempSrc: Temporal   SpO2: 95%   Weight: 103 kg (226 lb 6.4 oz)   Height: 180.3 cm (71\")   PainSc: 0-No pain     Body mass index is 31.58 kg/m².     Head: " Normocephalic, atraumatic.  Eyes PERRLA, EOMs intact.  Sclera and conjunctiva clear.  Mouth edentulous.  No intraoral lesions noted  Neck: Short, supple no cervical or periclavicular adenopathy.  Trachea at midline.  No JVD or carotid bruits  Heart: Regular rate and rhythm  Lungs: Clear to auscultation bilaterally  Abdomen: No palpable organomegaly.  Normal bowel sounds present.  Integumentary: No suspicious lesions noted on sun exposed skin  Musculoskeletal: No limb swelling.  Normal range of motion with all limbs.  No discomfort elicited on fist percussion of cervical, thoracic or lumbosacral spines  Lymphatic: No detectable cervical, periclavicular, axillary or inguinal adenopathy  Neurologic: Cranial nerves II through XII intact no motor, sensory, or cerebellar deficit.  Normal gait.  Psychiatric: Normal mood and affect.  Alert and oriented x3    Assessment / Plan      Assessment:   Squamous cell carcinoma of the right upper lung with right pretracheal lymphatic metastases.ICD-10 C34.9    Stage IIIA (cT1c cN1 M0)    Recommendations:  I have reviewed Mrs. Hays' medical records and imaging studies.  The stage specific recommendations of the National Comprehensive Cancer Network, version 3.2023, for resectable non-small cell carcinomas were consulted.  Definitive chemoradiation followed by adjuvant durvalumab is recommended.    Explained to the patient and her daughter-in-law that radiotherapy at our center will consist in the administration of 6000 cGy in 30 treatment fractions to the right upper lung primary lesion and to the involved mediastinal lymph nodes.  Treatments will be administered using volumetric modulated arc beam radiotherapy (VMAT).  This treatment modality permits the safer administration of high doses of radiotherapy to target sites while limiting radiation exposure of adjacent noninvolved bony and soft tissue structures.  Daily pre-VMAT cone beam CT scans of the chest are obtained to assure  accuracy and treatment delivery and uniformity in patient positioning.    I discussed the rationale for chemoradiation, duration of radiotherapy (6 weeks), possible acute and chronic side effects, expected outcomes, and posttreatment surveillance measures with the patient.  The many questions of the patient and her daughter-in-law were answered to their satisfaction.  Mrs. Hays wishes to proceed with treatment as outlined.    The patient underwent a treatment planning CT scan this morning.  This scan will be fused electronically to the PET CT scan of 06/20/2023 to delineate target sites.  I advised the patient that the treatment planning process may take several days.  We will coordinate the treatment start date with the medical oncology service.    Time spent with patient 60 minutes (the total time included previsit review of medical records and imaging studies, obtaining an updated independent history of present illness, an appropriate medical examination/evaluation, patient counseling, and coordination of care).    Thank you for allowing our participation in this very pleasant lady's treatment.    Nima Smith MD   09/28/23 15:26 EDT

## 2023-09-29 ENCOUNTER — PATIENT ROUNDING (BHMG ONLY) (OUTPATIENT)
Dept: RADIATION ONCOLOGY | Facility: HOSPITAL | Age: 78
End: 2023-09-29
Payer: MEDICARE

## 2023-09-29 ENCOUNTER — APPOINTMENT (OUTPATIENT)
Dept: GENERAL RADIOLOGY | Facility: HOSPITAL | Age: 78
End: 2023-09-29
Payer: MEDICARE

## 2023-09-29 ENCOUNTER — ANESTHESIA EVENT (OUTPATIENT)
Dept: PERIOP | Facility: HOSPITAL | Age: 78
End: 2023-09-29
Payer: MEDICARE

## 2023-09-29 ENCOUNTER — HOSPITAL ENCOUNTER (OUTPATIENT)
Facility: HOSPITAL | Age: 78
Setting detail: HOSPITAL OUTPATIENT SURGERY
Discharge: HOME | End: 2023-09-29
Attending: SURGERY | Admitting: SURGERY
Payer: MEDICARE

## 2023-09-29 ENCOUNTER — ANESTHESIA (OUTPATIENT)
Dept: PERIOP | Facility: HOSPITAL | Age: 78
End: 2023-09-29
Payer: MEDICARE

## 2023-09-29 VITALS
HEIGHT: 71 IN | BODY MASS INDEX: 31.25 KG/M2 | DIASTOLIC BLOOD PRESSURE: 70 MMHG | SYSTOLIC BLOOD PRESSURE: 142 MMHG | TEMPERATURE: 98.2 F | RESPIRATION RATE: 18 BRPM | WEIGHT: 223.2 LBS | HEART RATE: 66 BPM | OXYGEN SATURATION: 97 %

## 2023-09-29 DIAGNOSIS — R91.1 PULMONARY NODULE: ICD-10-CM

## 2023-09-29 LAB — GLUCOSE BLDC GLUCOMTR-MCNC: 132 MG/DL (ref 70–130)

## 2023-09-29 PROCEDURE — 71045 X-RAY EXAM CHEST 1 VIEW: CPT

## 2023-09-29 PROCEDURE — C1788 PORT, INDWELLING, IMP: HCPCS | Performed by: SURGERY

## 2023-09-29 PROCEDURE — 94799 UNLISTED PULMONARY SVC/PX: CPT

## 2023-09-29 PROCEDURE — 0 MEPERIDINE PER 100 MG: Performed by: NURSE ANESTHETIST, CERTIFIED REGISTERED

## 2023-09-29 PROCEDURE — 76000 FLUOROSCOPY <1 HR PHYS/QHP: CPT | Performed by: RADIOLOGY

## 2023-09-29 PROCEDURE — 25010000002 PROPOFOL 200 MG/20ML EMULSION: Performed by: NURSE ANESTHETIST, CERTIFIED REGISTERED

## 2023-09-29 PROCEDURE — 76000 FLUOROSCOPY <1 HR PHYS/QHP: CPT

## 2023-09-29 PROCEDURE — 94640 AIRWAY INHALATION TREATMENT: CPT

## 2023-09-29 PROCEDURE — 36561 INSERT TUNNELED CV CATH: CPT | Performed by: SURGERY

## 2023-09-29 PROCEDURE — 71045 X-RAY EXAM CHEST 1 VIEW: CPT | Performed by: RADIOLOGY

## 2023-09-29 PROCEDURE — 77001 FLUOROGUIDE FOR VEIN DEVICE: CPT | Performed by: SURGERY

## 2023-09-29 PROCEDURE — 25010000002 HEPARIN LOCK FLUSH PER 10 UNITS: Performed by: SURGERY

## 2023-09-29 PROCEDURE — 0 LIDOCAINE 1 % SOLUTION: Performed by: SURGERY

## 2023-09-29 PROCEDURE — 82948 REAGENT STRIP/BLOOD GLUCOSE: CPT

## 2023-09-29 PROCEDURE — 25010000002 CEFAZOLIN PER 500 MG: Performed by: SURGERY

## 2023-09-29 PROCEDURE — 76937 US GUIDE VASCULAR ACCESS: CPT | Performed by: SURGERY

## 2023-09-29 PROCEDURE — 25010000002 FENTANYL CITRATE (PF) 50 MCG/ML SOLUTION: Performed by: NURSE ANESTHETIST, CERTIFIED REGISTERED

## 2023-09-29 PROCEDURE — 25010000002 ONDANSETRON PER 1 MG: Performed by: NURSE ANESTHETIST, CERTIFIED REGISTERED

## 2023-09-29 DEVICE — PRT INTRO VASC/INTERV VORTEX FILL/HL DETACH/POLYURET/CATH 8F: Type: IMPLANTABLE DEVICE | Site: CHEST | Status: FUNCTIONAL

## 2023-09-29 RX ORDER — LIDOCAINE HYDROCHLORIDE 20 MG/ML
INJECTION, SOLUTION EPIDURAL; INFILTRATION; INTRACAUDAL; PERINEURAL AS NEEDED
Status: DISCONTINUED | OUTPATIENT
Start: 2023-09-29 | End: 2023-09-29 | Stop reason: SURG

## 2023-09-29 RX ORDER — SODIUM CHLORIDE 9 MG/ML
40 INJECTION, SOLUTION INTRAVENOUS AS NEEDED
Status: DISCONTINUED | OUTPATIENT
Start: 2023-09-29 | End: 2023-09-29 | Stop reason: HOSPADM

## 2023-09-29 RX ORDER — IBUPROFEN 600 MG/1
600 TABLET ORAL EVERY 6 HOURS PRN
Qty: 30 TABLET | Refills: 0 | Status: SHIPPED | OUTPATIENT
Start: 2023-09-29

## 2023-09-29 RX ORDER — IPRATROPIUM BROMIDE AND ALBUTEROL SULFATE 2.5; .5 MG/3ML; MG/3ML
3 SOLUTION RESPIRATORY (INHALATION) ONCE AS NEEDED
Status: COMPLETED | OUTPATIENT
Start: 2023-09-29 | End: 2023-09-29

## 2023-09-29 RX ORDER — SODIUM CHLORIDE 0.9 % (FLUSH) 0.9 %
10 SYRINGE (ML) INJECTION AS NEEDED
Status: DISCONTINUED | OUTPATIENT
Start: 2023-09-29 | End: 2023-09-29 | Stop reason: HOSPADM

## 2023-09-29 RX ORDER — MIDAZOLAM HYDROCHLORIDE 1 MG/ML
0.5 INJECTION INTRAMUSCULAR; INTRAVENOUS
Status: DISCONTINUED | OUTPATIENT
Start: 2023-09-29 | End: 2023-09-29 | Stop reason: HOSPADM

## 2023-09-29 RX ORDER — SODIUM CHLORIDE 0.9 % (FLUSH) 0.9 %
10 SYRINGE (ML) INJECTION EVERY 12 HOURS SCHEDULED
Status: DISCONTINUED | OUTPATIENT
Start: 2023-09-29 | End: 2023-09-29 | Stop reason: HOSPADM

## 2023-09-29 RX ORDER — FAMOTIDINE 10 MG/ML
INJECTION, SOLUTION INTRAVENOUS AS NEEDED
Status: DISCONTINUED | OUTPATIENT
Start: 2023-09-29 | End: 2023-09-29 | Stop reason: SURG

## 2023-09-29 RX ORDER — FENTANYL CITRATE 50 UG/ML
INJECTION, SOLUTION INTRAMUSCULAR; INTRAVENOUS AS NEEDED
Status: DISCONTINUED | OUTPATIENT
Start: 2023-09-29 | End: 2023-09-29 | Stop reason: SURG

## 2023-09-29 RX ORDER — OXYCODONE HYDROCHLORIDE AND ACETAMINOPHEN 5; 325 MG/1; MG/1
1 TABLET ORAL ONCE AS NEEDED
Status: COMPLETED | OUTPATIENT
Start: 2023-09-29 | End: 2023-09-29

## 2023-09-29 RX ORDER — SODIUM CHLORIDE 9 MG/ML
INJECTION, SOLUTION INTRAVENOUS AS NEEDED
Status: DISCONTINUED | OUTPATIENT
Start: 2023-09-29 | End: 2023-09-29 | Stop reason: HOSPADM

## 2023-09-29 RX ORDER — ACETAMINOPHEN 325 MG/1
650 TABLET ORAL EVERY 4 HOURS PRN
Qty: 30 TABLET | Refills: 0 | Status: SHIPPED | OUTPATIENT
Start: 2023-09-29

## 2023-09-29 RX ORDER — PROPOFOL 10 MG/ML
INJECTION, EMULSION INTRAVENOUS AS NEEDED
Status: DISCONTINUED | OUTPATIENT
Start: 2023-09-29 | End: 2023-09-29 | Stop reason: SURG

## 2023-09-29 RX ORDER — LIDOCAINE HYDROCHLORIDE 10 MG/ML
INJECTION, SOLUTION INFILTRATION; PERINEURAL AS NEEDED
Status: DISCONTINUED | OUTPATIENT
Start: 2023-09-29 | End: 2023-09-29 | Stop reason: HOSPADM

## 2023-09-29 RX ORDER — SODIUM CHLORIDE, SODIUM LACTATE, POTASSIUM CHLORIDE, CALCIUM CHLORIDE 600; 310; 30; 20 MG/100ML; MG/100ML; MG/100ML; MG/100ML
INJECTION, SOLUTION INTRAVENOUS CONTINUOUS PRN
Status: DISCONTINUED | OUTPATIENT
Start: 2023-09-29 | End: 2023-09-29 | Stop reason: SURG

## 2023-09-29 RX ORDER — FENTANYL CITRATE 50 UG/ML
50 INJECTION, SOLUTION INTRAMUSCULAR; INTRAVENOUS
Status: DISCONTINUED | OUTPATIENT
Start: 2023-09-29 | End: 2023-09-29 | Stop reason: HOSPADM

## 2023-09-29 RX ORDER — SODIUM CHLORIDE, SODIUM LACTATE, POTASSIUM CHLORIDE, CALCIUM CHLORIDE 600; 310; 30; 20 MG/100ML; MG/100ML; MG/100ML; MG/100ML
125 INJECTION, SOLUTION INTRAVENOUS ONCE
Status: COMPLETED | OUTPATIENT
Start: 2023-09-29 | End: 2023-09-29

## 2023-09-29 RX ORDER — ONDANSETRON 2 MG/ML
4 INJECTION INTRAMUSCULAR; INTRAVENOUS AS NEEDED
Status: DISCONTINUED | OUTPATIENT
Start: 2023-09-29 | End: 2023-09-29 | Stop reason: HOSPADM

## 2023-09-29 RX ORDER — MEPERIDINE HYDROCHLORIDE 25 MG/ML
12.5 INJECTION INTRAMUSCULAR; INTRAVENOUS; SUBCUTANEOUS
Status: DISCONTINUED | OUTPATIENT
Start: 2023-09-29 | End: 2023-09-29 | Stop reason: HOSPADM

## 2023-09-29 RX ORDER — HEPARIN SODIUM (PORCINE) LOCK FLUSH IV SOLN 100 UNIT/ML 100 UNIT/ML
SOLUTION INTRAVENOUS AS NEEDED
Status: DISCONTINUED | OUTPATIENT
Start: 2023-09-29 | End: 2023-09-29 | Stop reason: HOSPADM

## 2023-09-29 RX ORDER — ONDANSETRON 2 MG/ML
INJECTION INTRAMUSCULAR; INTRAVENOUS AS NEEDED
Status: DISCONTINUED | OUTPATIENT
Start: 2023-09-29 | End: 2023-09-29 | Stop reason: SURG

## 2023-09-29 RX ORDER — SODIUM CHLORIDE, SODIUM LACTATE, POTASSIUM CHLORIDE, CALCIUM CHLORIDE 600; 310; 30; 20 MG/100ML; MG/100ML; MG/100ML; MG/100ML
100 INJECTION, SOLUTION INTRAVENOUS ONCE AS NEEDED
Status: DISCONTINUED | OUTPATIENT
Start: 2023-09-29 | End: 2023-09-29 | Stop reason: HOSPADM

## 2023-09-29 RX ADMIN — SODIUM CHLORIDE, POTASSIUM CHLORIDE, SODIUM LACTATE AND CALCIUM CHLORIDE 125 ML/HR: 600; 310; 30; 20 INJECTION, SOLUTION INTRAVENOUS at 07:44

## 2023-09-29 RX ADMIN — FAMOTIDINE 20 MG: 10 INJECTION, SOLUTION INTRAVENOUS at 07:50

## 2023-09-29 RX ADMIN — FENTANYL CITRATE 50 MCG: 50 INJECTION INTRAMUSCULAR; INTRAVENOUS at 08:04

## 2023-09-29 RX ADMIN — PROPOFOL 100 MG: 10 INJECTION, EMULSION INTRAVENOUS at 07:54

## 2023-09-29 RX ADMIN — CEFAZOLIN 2000 MG: 2 INJECTION, POWDER, FOR SOLUTION INTRAMUSCULAR; INTRAVENOUS at 07:52

## 2023-09-29 RX ADMIN — IPRATROPIUM BROMIDE AND ALBUTEROL SULFATE 3 ML: 2.5; .5 SOLUTION RESPIRATORY (INHALATION) at 09:03

## 2023-09-29 RX ADMIN — LIDOCAINE HYDROCHLORIDE 60 MG: 20 INJECTION, SOLUTION EPIDURAL; INFILTRATION; INTRACAUDAL; PERINEURAL at 07:54

## 2023-09-29 RX ADMIN — OXYCODONE AND ACETAMINOPHEN 1 TABLET: 5; 325 TABLET ORAL at 09:30

## 2023-09-29 RX ADMIN — MEPERIDINE HYDROCHLORIDE 12.5 MG: 25 INJECTION INTRAMUSCULAR; INTRAVENOUS; SUBCUTANEOUS at 09:28

## 2023-09-29 RX ADMIN — FENTANYL CITRATE 50 MCG: 50 INJECTION INTRAMUSCULAR; INTRAVENOUS at 08:22

## 2023-09-29 RX ADMIN — SODIUM CHLORIDE, POTASSIUM CHLORIDE, SODIUM LACTATE AND CALCIUM CHLORIDE: 600; 310; 30; 20 INJECTION, SOLUTION INTRAVENOUS at 07:48

## 2023-09-29 RX ADMIN — ONDANSETRON 4 MG: 2 INJECTION INTRAMUSCULAR; INTRAVENOUS at 07:54

## 2023-09-29 NOTE — ANESTHESIA PREPROCEDURE EVALUATION
Anesthesia Evaluation     Patient summary reviewed and Nursing notes reviewed   no history of anesthetic complications:                Airway   Mallampati: III  TM distance: >3 FB  Neck ROM: full  Possible difficult intubation  Dental - normal exam   (+) poor dentition    Pulmonary - normal exam   (+) pneumonia , lung cancer, COPD,sleep apnea (intermittent) on CPAP  Cardiovascular - normal exam  Exercise tolerance: good (4-7 METS)    NYHA Classification: II  Rhythm: regular  Rate: normal    (+) hypertension, valvular problems/murmurs, past MI  >12 months, CAD, dysrhythmias, CHF , hyperlipidemia      Neuro/Psych  (+) CVA (6 years ago/ no residual)  GI/Hepatic/Renal/Endo    (+) GERD, renal disease, diabetes mellitus    Musculoskeletal     Abdominal   (+) obese    Bowel sounds: normal.   Substance History - negative use     OB/GYN negative ob/gyn ROS         Other   arthritis,   history of cancer (lung) active    ROS/Med Hx Other: Normal cardiiac cath  11/2020                  Anesthesia Plan    ASA 4     MAC     intravenous induction     Anesthetic plan, risks, benefits, and alternatives have been provided, discussed and informed consent has been obtained with: patient.  Pre-procedure education provided  Use of blood products discussed with  Consented to blood products.    Plan discussed with CRNA.

## 2023-09-29 NOTE — ANESTHESIA PROCEDURE NOTES
Airway  Urgency: elective    Date/Time: 9/29/2023 7:56 AM    General Information and Staff    Patient location during procedure: OR  CRNA/CAA: Ana Mcmullen CRNA    Indications and Patient Condition    Preoxygenated: yes  Mask difficulty assessment: 0 - not attempted    Final Airway Details  Final airway type: supraglottic airway      Successful airway: unique  Size 4     Number of attempts at approach: 1  Assessment: lips, teeth, and gum same as pre-op and atraumatic intubation

## 2023-09-29 NOTE — OP NOTE
INSERTION OF PORTACATH  Procedure Note    Mayra Hays  9/29/2023    Pre-op Diagnosis:   Pulmonary nodule [R91.1]    Post-op Diagnosis:     Post-Op Diagnosis Codes:     * Pulmonary nodule [R91.1]    Procedure(s):  INSERTION OF PORTACATH    Surgeon(s):  Petr Velásquez MD    Indication:  Lung cancer    Anesthesia: Choice    Staff:   Circulator: Angela Shin RN; Ana Rivero RN  Radiology Technologist: Jarrod Hernandez, RT  Scrub Person: Shena Jameson; Patricia Jefferson    Operative Description: The patient was taken to the operating suite and placed supine on operating table.  Bilateral sequential compression devices were applied and anesthesia was administered.  The right neck and chest were prepped and draped in sterile fashion.  Timeout procedure was performed after antibiotics had been confirmed.   The right neck and chest were then infiltrated with local anesthetic.  Under ultrasound visualization an 18-gauge access needle was inserted into the right internal jugular vein under direct visualization.  Venous blood was aspirated.  Through the access needle guidewire was placed without resistance.  Ultrasound and fluoroscopy confirmed guidewire in appropriate position and course.  A stab incision at the guidewire entry site was made.  Two fingerbreadths below the right clavicle, a transverse incision was then made and with blunt and cautery dissection a subcutaneous pocket was created.  The catheter was then tunneled subcutaneously over the clavicle through the guidewire entry site in the right neck.  The catheter was cut to appropriate length based on patient height and the port was assembled and placed in the subcutaneous pocket.  Under fluoroscopic visualization the dilator introducer sheath was inserted via Seldinger technique over the guidewire into the right internal jugular vein.  The guidewire and sheath were removed.  The catheter was inserted into the removable sheath and the  sheath was removed.  Fluoroscopy showed the catheter tip in appropriate position with no evidence of kinking.  The port was accessed and withdrew venous blood easily and flushed with heparinized saline solution easily.  The port was secured to the pectoralis fascia with Prolene sutures.  The port site was closed with subcutaneous Vicryl and Monocryl subcuticular suture.  The right neck incision was closed with a subcuticular Monocryl suture.  The incisions were dressed with SureClose and the patient was awakened from anesthesia after all counts were correct and taken to recovery where stat chest x-ray was ordered confirming placement.    Estimated Blood Loss: 5mL    Specimens:   none                 Drains: none    Grafts/Implants: R IJ port    Findings: R IJ port withdrew and flushed    Complications: none      Petr Velásquez MD     Date: 9/29/2023  Time: 08:36 EDT

## 2023-09-29 NOTE — ANESTHESIA POSTPROCEDURE EVALUATION
Patient: Mayra Hays    Procedure Summary       Date: 09/29/23 Room / Location:  [facility] 02 /  [facility]    Anesthesia Start: 0748 Anesthesia Stop: 0836    Procedure: INSERTION OF PORTACATH Diagnosis:       Pulmonary nodule      (Pulmonary nodule [R91.1])    Surgeons: Petr Velásquez MD Provider: Stu Patel MD    Anesthesia Type: MAC ASA Status: 4            Anesthesia Type: MAC    Vitals  Vitals Value Taken Time   /64 09/29/23 0842   Temp 97.9 °F (36.6 °C) 09/29/23 0837   Pulse 69 09/29/23 0909   Resp 20 09/29/23 0909   SpO2 99 % 09/29/23 0903           Post Anesthesia Care and Evaluation    Patient location during evaluation: PHASE II  Patient participation: complete - patient participated  Level of consciousness: awake and alert  Pain score: 1  Pain management: adequate    Airway patency: patent  Anesthetic complications: No anesthetic complications  PONV Status: controlled  Cardiovascular status: acceptable  Respiratory status: acceptable  Hydration status: acceptable

## 2023-10-02 ENCOUNTER — DOCUMENTATION (OUTPATIENT)
Dept: ONCOLOGY | Facility: HOSPITAL | Age: 78
End: 2023-10-02
Payer: MEDICARE

## 2023-10-02 ENCOUNTER — HOSPITAL ENCOUNTER (OUTPATIENT)
Dept: RADIATION ONCOLOGY | Facility: HOSPITAL | Age: 78
Setting detail: RADIATION/ONCOLOGY SERIES
End: 2023-10-02
Payer: MEDICARE

## 2023-10-02 NOTE — PROGRESS NOTES
Patient is 78 y.o. white female diagnosed with Non-small cell cancer of right lung.    Patient in clinic on September 28, 2023 for radiation consultation with Dr. Nima Smith MD.    Patient completed NCCN Distress Screening rating her distress a 4 out of 10.    Oncology Distress Level 4-7  Received: 4 days ago  Segundo Manjarrez MA Taylor, Pamela F, MSW    Ambulatory Referral to ONC Social Work [[id number]]    Electronically signed by: Segundo Manjarrez MA on 09/28/23 0828 Status: Active   Ordering user: Segundo Manjarrez MA 09/28/23 0828 Ordering provider: Nima Smith MD   Authorized by: Nima Smith MD   Cosigning events  Electronically cosigned by Nima Smith MD 09/28/23 0942 for Ordering   Frequency:  09/28/23 -     Diagnoses  Non-small cell cancer of right lung [C34.91]   Questionnaire    Question Answer   Follow-up needed: Yes       SS attempted to contact patient [phone] without success. SS left voicemail with return information.    Patient lives in [facility] @ [address] [place] [address] [address] [place] [address] traveling approximately 41 miles round trip.    SS provided patient with contact information and encouraged patient to call with any questions, concerns, or needs.     Patient verbalized understanding and agrees with resource assistance and plan.    SS will follow.

## 2023-10-03 ENCOUNTER — DOCUMENTATION (OUTPATIENT)
Dept: ONCOLOGY | Facility: HOSPITAL | Age: 78
End: 2023-10-03
Payer: MEDICARE

## 2023-10-03 NOTE — PROGRESS NOTES
"Patient is 78 y.o. white female diagnosed with Non-small cell cancer of right lung.     SS contacted patient' home phone [phone] this date. SS spoke with daughter Hailey. Daughter verbalizes that she is patient's power of .     Role of oncology social worker introduced to daughter.    Patient lives at home alone.    Daughter provides assistance with care as needed.    Patient doesn't utilize home health.    Patient has wheelchair and rolling walker per unknown DME provider.    Patient has six children: Hailey (POA), Vasu (POA), Gab, Edgar, Catarina, and Susanna.    Advance Care Planning:  The patient and I discussed care planning \"Conversations that Matter.\" This service was offered, free of charge, for development of advance directives with a certified ACP facilitator. The patient does not have an up-to-date advance directive. The patient is not interested in an appointment with one of our facilitators to create or update their advanced directives.    Distress Screening Follow-up    Diagnosis: Non-small cell cancer of right lung    Location of Distress Screening: Radiation Oncology    Distress Level: 4 (9/28/2023  8:00 AM)    Financial Needs: SSDI (Patient states that he receives social security benefits.)  Transportation Needs: -- (SS [facility] applications [facility] [place] [facility] and faxed to each agency for travel assistance request.)  Emotional Needs: emotional suppport/coping strategies (Time spent talking with patient, empathetic listening provided, and reassurance given.    SS offered supportive counseling services but patient declines at this time.)  Practical Needs: other (see comments) (Patient's family assist with providing daily needs, such as medication, food preparation, laundry, medication assistance, etc)    Patient lives in [facility] @ [address] [place] [address] [address] [place] [address] traveling approximately 41 miles round trip.     Daughter verbalizes that " travel funds are needed. SS completed application for [facility].      SS provided daughter with contact information and encouraged patient to call with any questions, concerns, or needs.     Daughter verbalized understanding and agrees with resource assistance and plan.    SS will follow.

## 2023-10-03 NOTE — PROGRESS NOTES
CC: LEFT shoulder pain    42 y.o. male presents for MRI review of left shoulder; he is sent to clinic by Alvaro Muro PA-C.  Concern for rotator cuff tear. Patient does not report any new incidents or injuries since their last appointment. Pain and symptoms remain unchanged since his last appointment. Here today to discuss treatment options.        Hx (Alvaro Muro PA-C)  Patient is a 42-year-old male who presents today for evaluation of left shoulder pain.  He has a history of left shoulder arthroscopic rotator cuff repair and bankart/labral reconstruction with Dr. Mccrary in 2018.  Patient reports that he has been experiencing continued pain of the left shoulder over the past couple of years.  He describes this as intermittent and is worse with increased activity and exercising such as when trying to do pushups and lifting.  He denies any recent injuries or trauma to the left shoulder.  In the past he has received steroid injections in the shoulder which does seem to provide satisfactory relief for several months at a time. Last injection was this past February which provided relief for about 4-5 months. He denies any significant weakness of left upper extremity, but does note some occasional limitations with overhead activity and exercise which he primarily attributes to stiffness.  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  He is right-hand dominant.      He reports that the pain and weakness is worse with overhead activity. It also bothers him at night.    Is affecting ADLs.  Pain is 3/10 at it's worst.    DATE OF SURGERY:12/10/2018        PREOPERATIVE DIAGNOSES:   1. Left shoulder rotator cuff tear   2. Left shoulder labral tear  3. Left shoulder glenoid chondromalacia     POSTOPERATIVE DIAGNOSES:   1. Left shoulder rotator cuff tear   2. Left shoulder labral tear  3. Left shoulder glenoid chondromalacia     PROCEDURE:   1. Left shoulder arthroscopic rotator cuff repair with placement of a  Spoke with Hailey and informed her of Dr Clemens's statement below. Pt will be going to [facility] in [place] and have this repeated. Orders placed.    collagen scaffold augmentation  2. Left shoulder arthroscopic bankart (labral) reconstruction  3. Left shoulder arthroscopic chondroplasty glenoid  4. Left shoulder arthroscopic subacromial decompression.      SURGEON: Lázaro Mccrary M.D.     History reviewed. No pertinent past medical history.    Past Surgical History:   Procedure Laterality Date    ARTHROSCOPIC REPAIR OF ROTATOR CUFF OF SHOULDER Left 12/10/2018    Procedure: REPAIR, ROTATOR CUFF, ARTHROSCOPIC;  Surgeon: Lázaro Mccrary MD;  Location: [facility];  Service: Orthopedics;  Laterality: Left;  regional w/o catheter     ARTHROSCOPY OF SHOULDER WITH DECOMPRESSION OF SUBACROMIAL SPACE Left 12/10/2018    Procedure: ARTHROSCOPY, SHOULDER, WITH SUBACROMIAL SPACE DECOMPRESSION;  Surgeon: Lázaro Mccrary MD;  Location: [facility] OR;  Service: Orthopedics;  Laterality: Left;    HIP ARTHROSCOPY W/ LABRAL DEBRIDEMENT Left 12/10/2018    Procedure: DEBRIDEMENT, LABRUM, HIP, ARTHROSCOPIC;  Surgeon: Lázaro Mccrary MD;  Location: [facility] OR;  Service: Orthopedics;  Laterality: Left;    ROTATOR CUFF REPAIR Right 2016    SHOULDER ARTHROSCOPY W/ BANKHART PROCEDURE Left 12/10/2018    Procedure: ARTHROSCOPY, SHOULDER, WITH BANKART REPAIR;  Surgeon: Lázaro Mccrary MD;  Location: [facility] OR;  Service: Orthopedics;  Laterality: Left;    WISDOM TOOTH EXTRACTION         History reviewed. No pertinent family history.      Current Outpatient Medications:     meloxicam (MOBIC) 15 MG tablet, Take 1 tablet (15 mg total) by mouth once daily., Disp: 30 tablet, Rfl: 2    valacyclovir (VALTREX) 500 MG tablet, TAKE ONE TABLET BY MOUTH THREE TIMES DAILY AS NEEDED FOR flares, Disp: , Rfl: 0    aspirin (ECOTRIN) 325 MG EC tablet, Take 1 tablet (325 mg total) by mouth every 12 (twelve) hours. for 21 days, Disp: 42 tablet, Rfl: 0    diclofenac sodium (PENNSAID) 20 mg/gram /actuation(2 %) sopm, Apply 40 mg topically 2 (two) times daily. (Patient not taking: Reported on 1/31/2020),  "Disp: 1 Bottle, Rfl: 2    oxyCODONE-acetaminophen (PERCOCET)  mg per tablet, Take 1 tablet by mouth every 8 hours as needed for pain. Take stool softener with this medication. (Patient not taking: Reported on 1/31/2020), Disp: 21 tablet, Rfl: 0    promethazine (PHENERGAN) 25 MG tablet, Take 1 tablet (25 mg total) by mouth every 6 (six) hours as needed for Nausea. (Patient not taking: Reported on 1/31/2020), Disp: 16 tablet, Rfl: 0    traMADol (ULTRAM) 50 mg tablet, Take 1 tablet (50 mg total) by mouth every 12 (twelve) hours as needed (surgical pain). (Patient not taking: Reported on 1/31/2020), Disp: 20 tablet, Rfl: 0    Review of patient's allergies indicates:   Allergen Reactions    Contrast media Rash     Rash on entire body with MRI contrast for 2 MRIs. Most recent being on 11/7/18.          REVIEW OF SYSTEMS:  Constitution: Negative. Negative for chills, fever and night sweats.   HENT: Negative for congestion and headaches.    Eyes: Negative for blurred vision, left vision loss and right vision loss.   Cardiovascular: Negative for chest pain and syncope.   Respiratory: Negative for cough and shortness of breath.    Endocrine: Negative for polydipsia, polyphagia and polyuria.   Hematologic/Lymphatic: Negative for bleeding problem. Does not bruise/bleed easily.   Skin: Negative for dry skin, itching and rash.   Musculoskeletal: Negative for falls.  Positive for left shoulder pain and muscle weakness.   Gastrointestinal: Negative for abdominal pain and bowel incontinence.   Genitourinary: Negative for bladder incontinence and nocturia.   Neurological: Negative for disturbances in coordination, loss of balance and seizures.   Psychiatric/Behavioral: Negative for depression. The patient does not have insomnia.    Allergic/Immunologic: Negative for hives and persistent infections.      PHYSICAL EXAMINATION:  Vitals:  /76   Pulse 65   Ht 5' 10" (1.778 m)   Wt 97 kg (213 lb 13.5 oz)   BMI 30.68 kg/m²  "   General: The patient is alert and oriented x 3.  Mood is pleasant.  Observation of ears, eyes and nose reveal no gross abnormalities.  No labored breathing observed.  Gait is coordinated. Patient can toe walk and heel walk without difficulty.      LEFT Shoulder / Upper Extremity Exam    OBSERVATION:     Swelling  none  Deformity  none   Discoloration  none   Scapular winging none   Scars   none  Atrophy  none    TENDERNESS / CREPITUS (T/C):          T/C      T/C   Clavicle   -/-  SUPRAspinatus    -/-     AC Jt.    -/-  INFRAspinatus  -/-    SC Jt.    -/-  Deltoid    -/-      G. Tuberosity  -/-  LH BICEP groove  +/-   Acromion:  -/-  Midline Neck   -/-     Scapular Spine -/-  Trapezium   -/-   SMA Scapula  -/-  GH jt. line - post  +/-     Scapulothoracic  -/-         ROM: (* = with pain)  Right shoulder   Left shoulder        AROM (PROM)   AROM (PROM)   FE    170° (175°)     160°* (175°*)     ER at 0°    60°  (65°)    60°  (65°)   ER at 90° ABD  90°  (90°)    60°*  (70°*)   IR at 90°  ABD   NA  (40°)     50*  (60°*)      IR (spine level)   T8     T10    STRENGTH: (* = with pain) Right shoulder   Left shoulder    SCAPTION   5/5    4+/5    IR    5/5    5/5   ER    5/5    4+/5   BICEPS   5/5    5/5   Deltoid    5/5    5/5     SIGNS:  Painful side       NEER   +    OBEULAHS  +    SORIA   +    SPEEDS  +     DROP ARM   -   BELLY PRESS neg   Superior escape none    LIFT-OFF  neg   X-Body ADD    neg    MOVING VALGUS neg        STABILITY TESTING    Right shoulder   Left shoulder    Translation     Anterior  up face    up face    Posterior  up face    up face    Sulcus   < 10mm   < 10 mm     Signs   Apprehension   neg      neg       Relocation   no change     no change      Jerk test  neg     neg    EXTREMITY NEURO-VASCULAR EXAM:    Sensation grossly intact to light touch all dermatomal regions.    DTR 2+ Biceps, Triceps, BR and Negative Clyas sign   Grossly intact motor function at Elbow, Wrist and  Hand   Distal pulses radial and ulnar 2+, brisk cap refill, symmetric.      NECK:  Painless FROM and spinous processes non-tender. Negative Spurlings sign.      OTHER FINDINGS:  + scapular dyskinesia    X-rays left shoulder (2/24/2023):  FINDINGS:  No acute fracture.  No malalignment.  Some spurring inferiorly to the glenoid side of the glenohumeral articulation.  Preserved acromioclavicular articulation.  No findings of calcific tendinitis.     MRI left shoulder (10/2/23):   Impression:     1. Status post rotator cuff tendon repair.  Focus of signal void at the supraspinatus footprint may represent postoperative susceptibility artifact or hydroxyapatite deposition.  There is associated supraspinatus tendinosis and undersurface partial thickness tear.  2. Status post Bankart labral reconstruction.  No evidence for retear.  Possible irregularity of the superior to posterior labrum.          ASSESSMENT:     Left shoulder pain/stiffness  S/p rotator cuff repair/labral reconstruction, left        PLAN:      - possible CSI left shoulder vs Shoulder arthroscopy.   Patient will call.         All questions were answered, patient will contact us for questions or concerns in the interim.

## 2023-10-04 LAB
FUNGUS WND CULT: NORMAL
MYCOBACTERIUM SPEC CULT: NORMAL
NIGHT BLUE STAIN TISS: NORMAL

## 2023-10-04 PROCEDURE — 77300 RADIATION THERAPY DOSE PLAN: CPT | Performed by: RADIOLOGY

## 2023-10-04 PROCEDURE — 77338 DESIGN MLC DEVICE FOR IMRT: CPT | Performed by: RADIOLOGY

## 2023-10-04 PROCEDURE — 77301 RADIOTHERAPY DOSE PLAN IMRT: CPT | Performed by: RADIOLOGY

## 2023-10-05 PROCEDURE — 77301 RADIOTHERAPY DOSE PLAN IMRT: CPT | Performed by: RADIOLOGY

## 2023-10-05 PROCEDURE — 77338 DESIGN MLC DEVICE FOR IMRT: CPT | Performed by: RADIOLOGY

## 2023-10-05 PROCEDURE — 77300 RADIATION THERAPY DOSE PLAN: CPT | Performed by: RADIOLOGY

## 2023-10-06 ENCOUNTER — PATIENT OUTREACH (OUTPATIENT)
Dept: ONCOLOGY | Facility: CLINIC | Age: 78
End: 2023-10-06
Payer: MEDICARE

## 2023-10-09 ENCOUNTER — INFUSION (OUTPATIENT)
Dept: ONCOLOGY | Facility: HOSPITAL | Age: 78
End: 2023-10-09
Payer: MEDICARE

## 2023-10-09 ENCOUNTER — APPOINTMENT (OUTPATIENT)
Dept: CT IMAGING | Facility: HOSPITAL | Age: 78
End: 2023-10-09
Payer: MEDICARE

## 2023-10-09 ENCOUNTER — APPOINTMENT (OUTPATIENT)
Dept: GENERAL RADIOLOGY | Facility: HOSPITAL | Age: 78
End: 2023-10-09
Payer: MEDICARE

## 2023-10-09 ENCOUNTER — HOSPITAL ENCOUNTER (EMERGENCY)
Facility: HOSPITAL | Age: 78
Discharge: HOME | End: 2023-10-09
Attending: EMERGENCY MEDICINE | Admitting: EMERGENCY MEDICINE
Payer: MEDICARE

## 2023-10-09 ENCOUNTER — APPOINTMENT (OUTPATIENT)
Dept: RADIATION ONCOLOGY | Facility: HOSPITAL | Age: 78
End: 2023-10-09
Payer: MEDICARE

## 2023-10-09 ENCOUNTER — LAB (OUTPATIENT)
Dept: ONCOLOGY | Facility: HOSPITAL | Age: 78
End: 2023-10-09
Payer: MEDICARE

## 2023-10-09 VITALS
SYSTOLIC BLOOD PRESSURE: 168 MMHG | HEART RATE: 60 BPM | RESPIRATION RATE: 18 BRPM | OXYGEN SATURATION: 92 % | TEMPERATURE: 97.8 F | DIASTOLIC BLOOD PRESSURE: 73 MMHG | WEIGHT: 222.6 LBS | BODY MASS INDEX: 31.05 KG/M2

## 2023-10-09 VITALS
DIASTOLIC BLOOD PRESSURE: 107 MMHG | RESPIRATION RATE: 20 BRPM | WEIGHT: 222 LBS | BODY MASS INDEX: 31.08 KG/M2 | OXYGEN SATURATION: 100 % | SYSTOLIC BLOOD PRESSURE: 179 MMHG | TEMPERATURE: 97.9 F | HEIGHT: 71 IN | HEART RATE: 88 BPM

## 2023-10-09 DIAGNOSIS — C34.91 NON-SMALL CELL CANCER OF RIGHT LUNG: ICD-10-CM

## 2023-10-09 DIAGNOSIS — R55 SYNCOPE, UNSPECIFIED SYNCOPE TYPE: Primary | ICD-10-CM

## 2023-10-09 DIAGNOSIS — C34.11 PRIMARY CANCER OF RIGHT UPPER LOBE OF LUNG: Primary | ICD-10-CM

## 2023-10-09 DIAGNOSIS — C34.11 PRIMARY CANCER OF RIGHT UPPER LOBE OF LUNG: ICD-10-CM

## 2023-10-09 LAB
A-A DO2: 72.4 MMHG (ref 0–300)
ALBUMIN SERPL-MCNC: 3.9 G/DL (ref 3.5–5.2)
ALBUMIN SERPL-MCNC: 4 G/DL (ref 3.5–5.2)
ALBUMIN/GLOB SERPL: 1.1 G/DL
ALBUMIN/GLOB SERPL: 1.1 G/DL
ALP SERPL-CCNC: 151 U/L (ref 39–117)
ALP SERPL-CCNC: 153 U/L (ref 39–117)
ALT SERPL W P-5'-P-CCNC: 12 U/L (ref 1–33)
ALT SERPL W P-5'-P-CCNC: <5 U/L (ref 1–33)
AMMONIA BLD-SCNC: 28 UMOL/L (ref 11–51)
ANION GAP SERPL CALCULATED.3IONS-SCNC: 7.9 MMOL/L (ref 5–15)
ANION GAP SERPL CALCULATED.3IONS-SCNC: 8.3 MMOL/L (ref 5–15)
ARTERIAL PATENCY WRIST A: ABNORMAL
AST SERPL-CCNC: 12 U/L (ref 1–32)
AST SERPL-CCNC: 15 U/L (ref 1–32)
ATMOSPHERIC PRESS: 722 MMHG
BASE EXCESS BLDA CALC-SCNC: 2.8 MMOL/L (ref 0–2)
BASOPHILS # BLD AUTO: 0.09 10*3/MM3 (ref 0–0.2)
BASOPHILS # BLD AUTO: 0.09 10*3/MM3 (ref 0–0.2)
BASOPHILS NFR BLD AUTO: 0.7 % (ref 0–1.5)
BASOPHILS NFR BLD AUTO: 0.9 % (ref 0–1.5)
BDY SITE: ABNORMAL
BILIRUB SERPL-MCNC: 0.2 MG/DL (ref 0–1.2)
BILIRUB SERPL-MCNC: 0.3 MG/DL (ref 0–1.2)
BUN SERPL-MCNC: 13 MG/DL (ref 8–23)
BUN SERPL-MCNC: 13 MG/DL (ref 8–23)
BUN/CREAT SERPL: 15.9 (ref 7–25)
BUN/CREAT SERPL: 16.3 (ref 7–25)
CALCIUM SPEC-SCNC: 9.3 MG/DL (ref 8.6–10.5)
CALCIUM SPEC-SCNC: 9.4 MG/DL (ref 8.6–10.5)
CHLORIDE SERPL-SCNC: 101 MMOL/L (ref 98–107)
CHLORIDE SERPL-SCNC: 101 MMOL/L (ref 98–107)
CO2 BLDA-SCNC: 31.4 MMOL/L (ref 22–33)
CO2 SERPL-SCNC: 27.7 MMOL/L (ref 22–29)
CO2 SERPL-SCNC: 29.1 MMOL/L (ref 22–29)
COHGB MFR BLD: 1.5 % (ref 0–5)
CREAT SERPL-MCNC: 0.8 MG/DL (ref 0.57–1)
CREAT SERPL-MCNC: 0.82 MG/DL (ref 0.57–1)
DEPRECATED RDW RBC AUTO: 44.1 FL (ref 37–54)
DEPRECATED RDW RBC AUTO: 45.5 FL (ref 37–54)
EGFRCR SERPLBLD CKD-EPI 2021: 73.8 ML/MIN/1.73
EGFRCR SERPLBLD CKD-EPI 2021: 76 ML/MIN/1.73
EOSINOPHIL # BLD AUTO: 0.49 10*3/MM3 (ref 0–0.4)
EOSINOPHIL # BLD AUTO: 0.69 10*3/MM3 (ref 0–0.4)
EOSINOPHIL NFR BLD AUTO: 3.7 % (ref 0.3–6.2)
EOSINOPHIL NFR BLD AUTO: 6.8 % (ref 0.3–6.2)
ERYTHROCYTE [DISTWIDTH] IN BLOOD BY AUTOMATED COUNT: 14.1 % (ref 12.3–15.4)
ERYTHROCYTE [DISTWIDTH] IN BLOOD BY AUTOMATED COUNT: 14.2 % (ref 12.3–15.4)
FLUAV RNA RESP QL NAA+PROBE: NOT DETECTED
FLUBV RNA ISLT QL NAA+PROBE: NOT DETECTED
GAS FLOW AIRWAY: 4 LPM
GEN 5 2HR TROPONIN T REFLEX: 24 NG/L
GLOBULIN UR ELPH-MCNC: 3.5 GM/DL
GLOBULIN UR ELPH-MCNC: 3.6 GM/DL
GLUCOSE BLDC GLUCOMTR-MCNC: 157 MG/DL (ref 70–130)
GLUCOSE SERPL-MCNC: 157 MG/DL (ref 65–99)
GLUCOSE SERPL-MCNC: 185 MG/DL (ref 65–99)
HCO3 BLDA-SCNC: 29.7 MMOL/L (ref 20–26)
HCT VFR BLD AUTO: 35.1 % (ref 34–46.6)
HCT VFR BLD AUTO: 36.7 % (ref 34–46.6)
HCT VFR BLD CALC: 33.5 % (ref 38–51)
HGB BLD-MCNC: 10.5 G/DL (ref 12–15.9)
HGB BLD-MCNC: 10.7 G/DL (ref 12–15.9)
HGB BLDA-MCNC: 10.9 G/DL (ref 13.5–17.5)
HOLD SPECIMEN: NORMAL
HOLD SPECIMEN: NORMAL
IMM GRANULOCYTES # BLD AUTO: 0.07 10*3/MM3 (ref 0–0.05)
IMM GRANULOCYTES # BLD AUTO: 0.09 10*3/MM3 (ref 0–0.05)
IMM GRANULOCYTES NFR BLD AUTO: 0.7 % (ref 0–0.5)
IMM GRANULOCYTES NFR BLD AUTO: 0.7 % (ref 0–0.5)
INHALED O2 CONCENTRATION: 36 %
LYMPHOCYTES # BLD AUTO: 1.59 10*3/MM3 (ref 0.7–3.1)
LYMPHOCYTES # BLD AUTO: 1.68 10*3/MM3 (ref 0.7–3.1)
LYMPHOCYTES NFR BLD AUTO: 12.1 % (ref 19.6–45.3)
LYMPHOCYTES NFR BLD AUTO: 16.5 % (ref 19.6–45.3)
Lab: ABNORMAL
MAGNESIUM SERPL-MCNC: 2 MG/DL (ref 1.6–2.4)
MCH RBC QN AUTO: 25.5 PG (ref 26.6–33)
MCH RBC QN AUTO: 25.6 PG (ref 26.6–33)
MCHC RBC AUTO-ENTMCNC: 29.2 G/DL (ref 31.5–35.7)
MCHC RBC AUTO-ENTMCNC: 29.9 G/DL (ref 31.5–35.7)
MCV RBC AUTO: 85.4 FL (ref 79–97)
MCV RBC AUTO: 87.8 FL (ref 79–97)
METHGB BLD QL: <-0.1 % (ref 0–3)
MODALITY: ABNORMAL
MONOCYTES # BLD AUTO: 0.61 10*3/MM3 (ref 0.1–0.9)
MONOCYTES # BLD AUTO: 0.89 10*3/MM3 (ref 0.1–0.9)
MONOCYTES NFR BLD AUTO: 4.6 % (ref 5–12)
MONOCYTES NFR BLD AUTO: 8.7 % (ref 5–12)
NEUTROPHILS NFR BLD AUTO: 10.26 10*3/MM3 (ref 1.7–7)
NEUTROPHILS NFR BLD AUTO: 6.76 10*3/MM3 (ref 1.7–7)
NEUTROPHILS NFR BLD AUTO: 66.4 % (ref 42.7–76)
NEUTROPHILS NFR BLD AUTO: 78.2 % (ref 42.7–76)
NRBC BLD AUTO-RTO: 0 /100 WBC (ref 0–0.2)
NRBC BLD AUTO-RTO: 0 /100 WBC (ref 0–0.2)
NT-PROBNP SERPL-MCNC: 532.8 PG/ML (ref 0–1800)
OXYHGB MFR BLDV: 97.4 % (ref 94–99)
PCO2 BLDA: 56.3 MM HG (ref 35–45)
PCO2 TEMP ADJ BLD: ABNORMAL MM[HG]
PH BLDA: 7.33 PH UNITS (ref 7.35–7.45)
PH, TEMP CORRECTED: ABNORMAL
PLATELET # BLD AUTO: 323 10*3/MM3 (ref 140–450)
PLATELET # BLD AUTO: 354 10*3/MM3 (ref 140–450)
PMV BLD AUTO: 10.9 FL (ref 6–12)
PMV BLD AUTO: 11 FL (ref 6–12)
PO2 BLDA: 109 MM HG (ref 83–108)
PO2 TEMP ADJ BLD: ABNORMAL MM[HG]
POTASSIUM SERPL-SCNC: 3.9 MMOL/L (ref 3.5–5.2)
POTASSIUM SERPL-SCNC: 4 MMOL/L (ref 3.5–5.2)
PROT SERPL-MCNC: 7.5 G/DL (ref 6–8.5)
PROT SERPL-MCNC: 7.5 G/DL (ref 6–8.5)
QT INTERVAL: 410 MS
QTC INTERVAL: 439 MS
RBC # BLD AUTO: 4.11 10*6/MM3 (ref 3.77–5.28)
RBC # BLD AUTO: 4.18 10*6/MM3 (ref 3.77–5.28)
SAO2 % BLDCOA: 98.7 % (ref 94–99)
SARS-COV-2 RNA RESP QL NAA+PROBE: NOT DETECTED
SODIUM SERPL-SCNC: 137 MMOL/L (ref 136–145)
SODIUM SERPL-SCNC: 138 MMOL/L (ref 136–145)
T4 FREE SERPL-MCNC: 1.23 NG/DL (ref 0.93–1.7)
TROPONIN T DELTA: 11 NG/L
TROPONIN T SERPL HS-MCNC: 13 NG/L
TSH SERPL DL<=0.05 MIU/L-ACNC: 2.3 UIU/ML (ref 0.27–4.2)
VENTILATOR MODE: ABNORMAL
WBC NRBC COR # BLD: 10.18 10*3/MM3 (ref 3.4–10.8)
WBC NRBC COR # BLD: 13.13 10*3/MM3 (ref 3.4–10.8)
WHOLE BLOOD HOLD COAG: NORMAL
WHOLE BLOOD HOLD SPECIMEN: NORMAL

## 2023-10-09 PROCEDURE — 71275 CT ANGIOGRAPHY CHEST: CPT

## 2023-10-09 PROCEDURE — 80053 COMPREHEN METABOLIC PANEL: CPT

## 2023-10-09 PROCEDURE — 84439 ASSAY OF FREE THYROXINE: CPT | Performed by: EMERGENCY MEDICINE

## 2023-10-09 PROCEDURE — 80053 COMPREHEN METABOLIC PANEL: CPT | Performed by: EMERGENCY MEDICINE

## 2023-10-09 PROCEDURE — 85025 COMPLETE CBC W/AUTO DIFF WBC: CPT

## 2023-10-09 PROCEDURE — 25010000002 DIPHENHYDRAMINE PER 50 MG: Performed by: INTERNAL MEDICINE

## 2023-10-09 PROCEDURE — 71275 CT ANGIOGRAPHY CHEST: CPT | Performed by: RADIOLOGY

## 2023-10-09 PROCEDURE — 25010000002 DEXAMETHASONE SODIUM PHOSPHATE 20 MG/5ML SOLUTION: Performed by: INTERNAL MEDICINE

## 2023-10-09 PROCEDURE — 87636 SARSCOV2 & INF A&B AMP PRB: CPT | Performed by: EMERGENCY MEDICINE

## 2023-10-09 PROCEDURE — 82375 ASSAY CARBOXYHB QUANT: CPT

## 2023-10-09 PROCEDURE — 82948 REAGENT STRIP/BLOOD GLUCOSE: CPT | Performed by: NURSE PRACTITIONER

## 2023-10-09 PROCEDURE — 36415 COLL VENOUS BLD VENIPUNCTURE: CPT

## 2023-10-09 PROCEDURE — 84484 ASSAY OF TROPONIN QUANT: CPT | Performed by: EMERGENCY MEDICINE

## 2023-10-09 PROCEDURE — 25010000002 HEPARIN LOCK FLUSH PER 10 UNITS: Performed by: EMERGENCY MEDICINE

## 2023-10-09 PROCEDURE — 85025 COMPLETE CBC W/AUTO DIFF WBC: CPT | Performed by: EMERGENCY MEDICINE

## 2023-10-09 PROCEDURE — 25010000002 PACLITAXEL PER 1 MG: Performed by: INTERNAL MEDICINE

## 2023-10-09 PROCEDURE — 25010000002 PALONOSETRON 0.25 MG/5ML SOLUTION PREFILLED SYRINGE: Performed by: INTERNAL MEDICINE

## 2023-10-09 PROCEDURE — 99285 EMERGENCY DEPT VISIT HI MDM: CPT

## 2023-10-09 PROCEDURE — 25010000002 DIPHENHYDRAMINE PER 50 MG

## 2023-10-09 PROCEDURE — 96375 TX/PRO/DX INJ NEW DRUG ADDON: CPT

## 2023-10-09 PROCEDURE — 83050 HGB METHEMOGLOBIN QUAN: CPT

## 2023-10-09 PROCEDURE — 82140 ASSAY OF AMMONIA: CPT | Performed by: EMERGENCY MEDICINE

## 2023-10-09 PROCEDURE — 93010 ELECTROCARDIOGRAM REPORT: CPT | Performed by: INTERNAL MEDICINE

## 2023-10-09 PROCEDURE — 71045 X-RAY EXAM CHEST 1 VIEW: CPT | Performed by: RADIOLOGY

## 2023-10-09 PROCEDURE — 25810000003 SODIUM CHLORIDE 0.9 % SOLUTION 250 ML FLEX CONT: Performed by: INTERNAL MEDICINE

## 2023-10-09 PROCEDURE — 36600 WITHDRAWAL OF ARTERIAL BLOOD: CPT

## 2023-10-09 PROCEDURE — 25010000002 METHYLPREDNISOLONE PER 125 MG

## 2023-10-09 PROCEDURE — 70450 CT HEAD/BRAIN W/O DYE: CPT | Performed by: RADIOLOGY

## 2023-10-09 PROCEDURE — 25510000001 IOPAMIDOL PER 1 ML: Performed by: EMERGENCY MEDICINE

## 2023-10-09 PROCEDURE — 93005 ELECTROCARDIOGRAM TRACING: CPT | Performed by: EMERGENCY MEDICINE

## 2023-10-09 PROCEDURE — 25810000003 SODIUM CHLORIDE 0.9 % SOLUTION: Performed by: INTERNAL MEDICINE

## 2023-10-09 PROCEDURE — 82805 BLOOD GASES W/O2 SATURATION: CPT

## 2023-10-09 PROCEDURE — 83735 ASSAY OF MAGNESIUM: CPT | Performed by: EMERGENCY MEDICINE

## 2023-10-09 PROCEDURE — 70450 CT HEAD/BRAIN W/O DYE: CPT

## 2023-10-09 PROCEDURE — 83880 ASSAY OF NATRIURETIC PEPTIDE: CPT | Performed by: EMERGENCY MEDICINE

## 2023-10-09 PROCEDURE — 96409 CHEMO IV PUSH SNGL DRUG: CPT

## 2023-10-09 PROCEDURE — 71045 X-RAY EXAM CHEST 1 VIEW: CPT

## 2023-10-09 PROCEDURE — 84443 ASSAY THYROID STIM HORMONE: CPT | Performed by: EMERGENCY MEDICINE

## 2023-10-09 PROCEDURE — 96376 TX/PRO/DX INJ SAME DRUG ADON: CPT

## 2023-10-09 RX ORDER — DIPHENHYDRAMINE HYDROCHLORIDE 50 MG/ML
INJECTION INTRAMUSCULAR; INTRAVENOUS
Status: COMPLETED
Start: 2023-10-09 | End: 2023-10-09

## 2023-10-09 RX ORDER — HEPARIN SODIUM (PORCINE) LOCK FLUSH IV SOLN 100 UNIT/ML 100 UNIT/ML
300 SOLUTION INTRAVENOUS ONCE
Status: COMPLETED | OUTPATIENT
Start: 2023-10-09 | End: 2023-10-09

## 2023-10-09 RX ORDER — PALONOSETRON 0.05 MG/ML
0.25 INJECTION, SOLUTION INTRAVENOUS ONCE
Status: COMPLETED | OUTPATIENT
Start: 2023-10-09 | End: 2023-10-09

## 2023-10-09 RX ORDER — FAMOTIDINE 10 MG/ML
20 INJECTION, SOLUTION INTRAVENOUS ONCE
Status: COMPLETED | OUTPATIENT
Start: 2023-10-09 | End: 2023-10-09

## 2023-10-09 RX ORDER — METHYLPREDNISOLONE SODIUM SUCCINATE 125 MG/2ML
INJECTION, POWDER, LYOPHILIZED, FOR SOLUTION INTRAMUSCULAR; INTRAVENOUS
Status: COMPLETED
Start: 2023-10-09 | End: 2023-10-09

## 2023-10-09 RX ORDER — METHYLPREDNISOLONE SODIUM SUCCINATE 125 MG/2ML
125 INJECTION, POWDER, LYOPHILIZED, FOR SOLUTION INTRAMUSCULAR; INTRAVENOUS ONCE
Status: COMPLETED | OUTPATIENT
Start: 2023-10-09 | End: 2023-10-09

## 2023-10-09 RX ORDER — SODIUM CHLORIDE 9 MG/ML
250 INJECTION, SOLUTION INTRAVENOUS ONCE
Status: COMPLETED | OUTPATIENT
Start: 2023-10-09 | End: 2023-10-09

## 2023-10-09 RX ORDER — SODIUM CHLORIDE 0.9 % (FLUSH) 0.9 %
10 SYRINGE (ML) INJECTION AS NEEDED
Status: DISCONTINUED | OUTPATIENT
Start: 2023-10-09 | End: 2023-10-09 | Stop reason: HOSPADM

## 2023-10-09 RX ORDER — DIPHENHYDRAMINE HYDROCHLORIDE 50 MG/ML
50 INJECTION INTRAMUSCULAR; INTRAVENOUS ONCE
Status: COMPLETED | OUTPATIENT
Start: 2023-10-09 | End: 2023-10-09

## 2023-10-09 RX ADMIN — DIPHENHYDRAMINE HYDROCHLORIDE 50 MG: 50 INJECTION, SOLUTION INTRAMUSCULAR; INTRAVENOUS at 09:35

## 2023-10-09 RX ADMIN — METHYLPREDNISOLONE SODIUM SUCCINATE 125 MG: 125 INJECTION, POWDER, LYOPHILIZED, FOR SOLUTION INTRAMUSCULAR; INTRAVENOUS at 10:35

## 2023-10-09 RX ADMIN — SODIUM CHLORIDE 250 ML: 9 INJECTION, SOLUTION INTRAVENOUS at 09:32

## 2023-10-09 RX ADMIN — FAMOTIDINE 20 MG: 10 INJECTION INTRAVENOUS at 10:33

## 2023-10-09 RX ADMIN — IOPAMIDOL 100 ML: 755 INJECTION, SOLUTION INTRAVENOUS at 16:42

## 2023-10-09 RX ADMIN — DIPHENHYDRAMINE HYDROCHLORIDE 50 MG: 50 INJECTION, SOLUTION INTRAMUSCULAR; INTRAVENOUS at 10:31

## 2023-10-09 RX ADMIN — Medication 300 UNITS: at 17:47

## 2023-10-09 RX ADMIN — FAMOTIDINE 20 MG: 10 INJECTION INTRAVENOUS at 09:32

## 2023-10-09 RX ADMIN — PALONOSETRON 0.25 MG: 0.25 INJECTION, SOLUTION INTRAVENOUS at 09:34

## 2023-10-09 RX ADMIN — PACLITAXEL 110 MG: 6 INJECTION, SOLUTION, CONCENTRATE INTRAVENOUS at 10:23

## 2023-10-09 RX ADMIN — METHYLPREDNISOLONE SODIUM SUCCINATE 125 MG: 125 INJECTION, POWDER, FOR SOLUTION INTRAMUSCULAR; INTRAVENOUS at 10:35

## 2023-10-09 RX ADMIN — DIPHENHYDRAMINE HYDROCHLORIDE 50 MG: 50 INJECTION INTRAMUSCULAR; INTRAVENOUS at 10:31

## 2023-10-09 RX ADMIN — DEXAMETHASONE SODIUM PHOSPHATE 12 MG: 4 INJECTION, SOLUTION INTRA-ARTICULAR; INTRALESIONAL; INTRAMUSCULAR; INTRAVENOUS; SOFT TISSUE at 09:50

## 2023-10-09 NOTE — DISCHARGE INSTRUCTIONS
Home care family.  Rest.  Take your medications as directed.  See your primary care provider in the office in 1 to 2 days.  Return to the emergency department right away if symptoms worsen/any problems.

## 2023-10-09 NOTE — ED PROVIDER NOTES
"Subjective   History of Present Illness  Patient is a 78-year-old female who is brought to the ED from the oncology clinic today, where she reportedly received her very first dose of Taxol, is receiving chemotherapy for lung cancer.  I spoke with the nurse that was caring for the patient in oncology, she advises that the patient was premedicated with 12 mg of IV Decadron, 50 mg of IV Benadryl, 20 mg of IV Pepcid.  She states that the Taxol infusion was begun, within just a very few minutes the patient was noted to lose consciousness, was noted to have slow shallow respirations, and was cyanotic in her face.  Nursing reports that she came out of this very quickly, it lasted only for a few moments.  They did then administer an additional 50 mg of IV Benadryl, 20 mg of IV Pepcid, as well as 125 mg of IV Solu-Medrol.  They report that she regained consciousness quickly, the cyanosis in the shallow breathing was very short-lived, the patient was then transported to the ED.  Upon arrival here the patient states that she feels \"pretty good\".  She denies chest pain, shortness of breath, abdominal pain, vomiting, focal numbness or weakness, other symptoms or other complaints.  She does not know what happened to her in the oncology clinic, she states that they started her new medication and she started feeling dizzy, the next thing she knew she was on her way to the emergency department.  She states that she did not lose consciousness to her knowledge, she did not have any shortness of breath or other symptoms.  By the time she arrives here she states that she feels completely back to normal, states that the dizziness was only very brief.      Review of Systems   All other systems reviewed and are negative.      Past Medical History:   Diagnosis Date    Abnormal ECG     Arthritis     CHF (congestive heart failure)     Chronic kidney disease     Collapsed lung     Congenital heart disease     pt unsure of this    COPD (chronic " obstructive pulmonary disease)     Coronary artery disease     Diabetes mellitus     TYPE 2    Elevated cholesterol     GERD (gastroesophageal reflux disease)     Heart valve disease     Hyperlipidemia     Hypertension     Myocardial infarction 2019    Primary cancer of right upper lobe of lung 9/12/2023    Sleep apnea     non compliant with CPAP    Stroke 2019    UTI (urinary tract infection)     Wears eyeglasses        Allergies   Allergen Reactions    Paclitaxel Anaphylaxis       Past Surgical History:   Procedure Laterality Date    BRONCHOSCOPY Bilateral 02/27/2023    Procedure: BRONCHOSCOPY WITH ENDOBRONCHIAL ULTRASOUND;  Surgeon: Abdulkadir Peter MD;  Location: [facility] OR;  Service: Pulmonary;  Laterality: Bilateral;    BRONCHOSCOPY WITH ION ROBOTIC ASSIST N/A 9/6/2023    Procedure: BRONCHOSCOPY WITH ION ROBOT AND EBUS;  Surgeon: John Clemens MD;  Location:  [facility];  Service: Robotics - Pulmonary;  Laterality: N/A;  Ion cath #6 - 0032,  #6 - 0030, cath guide # 0077. EBUS scope removed with balloon intact.    CARDIAC CATHETERIZATION N/A 08/22/2017    Procedure: Left Heart Cath;  Surgeon: Jatinder Matias MD;  Location: [facility] CATH INVASIVE LOCATION;  Service:     CARDIAC CATHETERIZATION N/A 11/22/2021    Procedure: Left Heart Cath;  Surgeon: Tolu Steinberg MD;  Location:  [facility] CATH INVASIVE LOCATION;  Service: Cardiology;  Laterality: N/A;    COLONOSCOPY      ENDOSCOPY      GALLBLADDER SURGERY      PORTACATH PLACEMENT N/A 9/29/2023    Procedure: INSERTION OF PORTACATH;  Surgeon: Petr Velásquez MD;  Location:  [facility] OR;  Service: General;  Laterality: N/A;    VENTRAL HERNIA REPAIR N/A 05/14/2020    Procedure: VENTRAL HERNIA REPAIR LAPAROSCOPIC WITH DAVINCI ROBOT;  Surgeon: Petr Velásquez MD;  Location: [facility] OR;  Service: DaVinci;  Laterality: N/A;       Family History   Problem Relation Age of Onset    Heart disease Mother         Rhianna sophia    Heart disease Father      Heart attack Father     Heart failure Father        Social History     Socioeconomic History    Marital status: Single    Number of children: 6   Tobacco Use    Smoking status: Former     Packs/day: 3.00     Years: 55.00     Additional pack years: 0.00     Total pack years: 165.00     Types: Cigarettes     Start date: 4/3/1998     Quit date: 2015     Years since quittin.7    Smokeless tobacco: Never   Vaping Use    Vaping Use: Never used   Substance and Sexual Activity    Alcohol use: No    Drug use: No    Sexual activity: Never           Objective   Physical Exam  Vitals and nursing note reviewed.   Constitutional:       General: She is not in acute distress.     Appearance: She is well-developed. She is not toxic-appearing or diaphoretic.      Comments: Pleasant female, awake and alert, in no apparent distress.   HENT:      Head: Normocephalic and atraumatic.   Eyes:      General: No scleral icterus.     Pupils: Pupils are equal, round, and reactive to light.   Neck:      Trachea: No tracheal deviation.   Cardiovascular:      Rate and Rhythm: Normal rate and regular rhythm.   Pulmonary:      Effort: Pulmonary effort is normal. No respiratory distress.      Breath sounds: Normal breath sounds.   Chest:      Chest wall: No tenderness.   Abdominal:      General: Bowel sounds are normal.      Palpations: Abdomen is soft.      Tenderness: There is no abdominal tenderness. There is no guarding or rebound.   Musculoskeletal:         General: No tenderness. Normal range of motion.      Cervical back: Normal range of motion and neck supple. No rigidity or tenderness.      Right lower leg: No edema.      Left lower leg: No edema.   Skin:     General: Skin is warm and dry.      Capillary Refill: Capillary refill takes less than 2 seconds.      Coloration: Skin is not pale.   Neurological:      General: No focal deficit present.      Mental Status: She is alert.      GCS: GCS eye subscore is 4. GCS verbal subscore is  5. GCS motor subscore is 6.      Motor: No abnormal muscle tone.      Comments: Patient is oriented to person, place but not to time.   Psychiatric:         Mood and Affect: Mood normal.         Behavior: Behavior normal.         Procedures  CT Angiogram Chest Pulmonary Embolism   Final Result   1.  Right upper lobe mass in association with pneumonia has increased in   size and extent when compared to the previous exam with extension to the   pleural surface. Masslike component is approximately 56.8 x 44.5 mm and   was previously 40.1 x 36.7 mm. This would correspond to primary   malignancy.   2.  Central bronchial wall thickening. Can be seen with reactive airway   disease or bronchitis.   3.  1.3 cm left adrenal nodule is noted. No significant change from   previous.   4.  Increased size of paratracheal and mediastinal lymph nodes. A right   paratracheal lymph node is 2.5 cm and was previously 2.2 cm.   5. No PE identified.   6. Marked cardiomegaly, stable.   7. Otherwise stable chest with other nonacute/incidental findings as   above.           This report was finalized on 10/9/2023 4:52 PM by Dr. Kunal Castanon MD.          CT Head Without Contrast   Final Result     Unremarkable exam demonstrating no CT evidence of acute intracranial   findings.           This report was finalized on 10/9/2023 12:15 PM by Dr. Kunal Castanon MD.          XR Chest 1 View   Final Result   1.  Right upper lobe suprahilar masslike opacity which would correspond   to patient's known primary malignancy.   2.  Cardiomegaly with pulmonary vascular congestion.           This report was finalized on 10/9/2023 11:57 AM by Dr. Kunal Castanon MD.            Results for orders placed or performed during the hospital encounter of 10/09/23   COVID-19 and FLU A/B PCR - Swab, Nasopharynx    Specimen: Nasopharynx; Swab   Result Value Ref Range    COVID19 Not Detected Not Detected - Ref. Range    Influenza A PCR Not Detected Not Detected     Influenza B PCR Not Detected Not Detected   Comprehensive Metabolic Panel    Specimen: Arm, Left; Blood   Result Value Ref Range    Glucose 185 (H) 65 - 99 mg/dL    BUN 13 8 - 23 mg/dL    Creatinine 0.82 0.57 - 1.00 mg/dL    Sodium 137 136 - 145 mmol/L    Potassium 4.0 3.5 - 5.2 mmol/L    Chloride 101 98 - 107 mmol/L    CO2 27.7 22.0 - 29.0 mmol/L    Calcium 9.3 8.6 - 10.5 mg/dL    Total Protein 7.5 6.0 - 8.5 g/dL    Albumin 4.0 3.5 - 5.2 g/dL    ALT (SGPT) 12 1 - 33 U/L    AST (SGOT) 15 1 - 32 U/L    Alkaline Phosphatase 153 (H) 39 - 117 U/L    Total Bilirubin 0.3 0.0 - 1.2 mg/dL    Globulin 3.5 gm/dL    A/G Ratio 1.1 g/dL    BUN/Creatinine Ratio 15.9 7.0 - 25.0    Anion Gap 8.3 5.0 - 15.0 mmol/L    eGFR 73.8 >60.0 mL/min/1.73   High Sensitivity Troponin T    Specimen: Arm, Left; Blood   Result Value Ref Range    HS Troponin T 13 (H) <10 ng/L   T4, Free    Specimen: Arm, Left; Blood   Result Value Ref Range    Free T4 1.23 0.93 - 1.70 ng/dL   TSH    Specimen: Arm, Left; Blood   Result Value Ref Range    TSH 2.300 0.270 - 4.200 uIU/mL   Magnesium    Specimen: Arm, Left; Blood   Result Value Ref Range    Magnesium 2.0 1.6 - 2.4 mg/dL   Ammonia    Specimen: Arm, Left; Blood   Result Value Ref Range    Ammonia 28 11 - 51 umol/L   BNP    Specimen: Arm, Left; Blood   Result Value Ref Range    proBNP 532.8 0.0 - 1,800.0 pg/mL   CBC Auto Differential    Specimen: Arm, Left; Blood   Result Value Ref Range    WBC 13.13 (H) 3.40 - 10.80 10*3/mm3    RBC 4.18 3.77 - 5.28 10*6/mm3    Hemoglobin 10.7 (L) 12.0 - 15.9 g/dL    Hematocrit 36.7 34.0 - 46.6 %    MCV 87.8 79.0 - 97.0 fL    MCH 25.6 (L) 26.6 - 33.0 pg    MCHC 29.2 (L) 31.5 - 35.7 g/dL    RDW 14.2 12.3 - 15.4 %    RDW-SD 45.5 37.0 - 54.0 fl    MPV 10.9 6.0 - 12.0 fL    Platelets 323 140 - 450 10*3/mm3    Neutrophil % 78.2 (H) 42.7 - 76.0 %    Lymphocyte % 12.1 (L) 19.6 - 45.3 %    Monocyte % 4.6 (L) 5.0 - 12.0 %    Eosinophil % 3.7 0.3 - 6.2 %    Basophil % 0.7 0.0 - 1.5  %    Immature Grans % 0.7 (H) 0.0 - 0.5 %    Neutrophils, Absolute 10.26 (H) 1.70 - 7.00 10*3/mm3    Lymphocytes, Absolute 1.59 0.70 - 3.10 10*3/mm3    Monocytes, Absolute 0.61 0.10 - 0.90 10*3/mm3    Eosinophils, Absolute 0.49 (H) 0.00 - 0.40 10*3/mm3    Basophils, Absolute 0.09 0.00 - 0.20 10*3/mm3    Immature Grans, Absolute 0.09 (H) 0.00 - 0.05 10*3/mm3    nRBC 0.0 0.0 - 0.2 /100 WBC   Blood Gas, Arterial With Co-Ox    Specimen: Arterial Blood   Result Value Ref Range    Site Left Brachial     Gonzalo's Test N/A     pH, Arterial 7.330 (L) 7.350 - 7.450 pH units    pCO2, Arterial 56.3 (H) 35.0 - 45.0 mm Hg    pO2, Arterial 109.0 (H) 83.0 - 108.0 mm Hg    HCO3, Arterial 29.7 (H) 20.0 - 26.0 mmol/L    Base Excess, Arterial 2.8 (H) 0.0 - 2.0 mmol/L    O2 Saturation, Arterial 98.7 94.0 - 99.0 %    Hemoglobin, Blood Gas 10.9 (L) 13.5 - 17.5 g/dL    Hematocrit, Blood Gas 33.5 (L) 38.0 - 51.0 %    Oxyhemoglobin 97.4 94 - 99 %    Methemoglobin <-0.10 (L) 0.00 - 3.00 %    Carboxyhemoglobin 1.5 0 - 5 %    A-a DO2 72.4 0.0 - 300.0 mmHg    CO2 Content 31.4 22 - 33 mmol/L    Barometric Pressure for Blood Gas 722 mmHg    Modality Nasal Cannula     FIO2 36 %    Flow Rate 4.0 lpm    Ventilator Mode NA     Collected by [id number]     pH, Temp Corrected      pCO2, Temperature Corrected      pO2, Temperature Corrected     High Sensitivity Troponin T 2Hr    Specimen: Blood   Result Value Ref Range    HS Troponin T 24 (H) <10 ng/L    Troponin T Delta 11 (C) >=-4 - <+4 ng/L   ECG 12 Lead QT Measurement   Result Value Ref Range    QT Interval 410 ms    QTC Interval 439 ms   Green Top (Gel)   Result Value Ref Range    Extra Tube Hold for add-ons.    Lavender Top   Result Value Ref Range    Extra Tube hold for add-on    Gold Top - SST   Result Value Ref Range    Extra Tube Hold for add-ons.    Light Blue Top   Result Value Ref Range    Extra Tube Hold for add-ons.                 ED Course  ED Course as of 10/09/23 1853   Mon Oct 09, 2023    1120 EKG shows sinus rhythm, rate 69.  CT interval 132, QRS duration 104, QTc 439 ms.  Evidence of septal infarct, age indeterminant, not felt to be acute.  Nonspecific ST changes.  No evidence for STEMI. [CM]   1725 Patient's emergency department stay has been entirely uneventful.  She has shown no signs of acute distress.  She has been sitting up in the chair interacting with various family members.  She voices that she feels much better and she wants to go home.  Patient is discharged home in care of her family.  She will return here immediately if any problems.  She will follow-up closely with her primary care. [CM]      ED Course User Index  [CM] Gab Kennedy MD                                           Medical Decision Making  Amount and/or Complexity of Data Reviewed  Labs: ordered. Decision-making details documented in ED Course.  Radiology: ordered. Decision-making details documented in ED Course.  ECG/medicine tests: ordered. Decision-making details documented in ED Course.    Risk  Prescription drug management.        Final diagnoses:   Syncope, unspecified syncope type       ED Disposition  ED Disposition       ED Disposition   Discharge    Condition   Stable    Comment   --               Niki Antony, APRN  [address]  [facility] [address]  [phone]    Go to   1 to 2 days    [facility] EMERGENCY DEPARTMENT  [address] [place] [id number] [phone]  Go to   If symptoms worsen         Medication List      No changes were made to your prescriptions during this visit.       Please note that portions of this note were completed with a voice recognition program.          Gab Kennedy MD  10/09/23 8165

## 2023-10-09 NOTE — CODE DOCUMENTATION
"Patient ambulatory to clinic for D1C1 Taxol/Carboplatin. All premedications administered per treatment plan. See MAR for details. Approx. 8 mins into Taxol infusion (total of 34.4 mL infused), pt noted to be unresponsive with eyes open, blue in color, and bradypnea noted. Faint pulses noted to left radius. Infusion stopped at this time and rapid response called. O2 NC @ 4LPM administered. Emergency medications given at bedside per ERASTO Esposito, see MAR for details. Pt quickly became alert stating \"I feel funny.\" V/S  BP: 221/119, , O2 SAT 98% 4L NC. Pt sent to ER via wheelchair and ER RN @ 1038 for further monitoring and evaluation. Emergency contact notified.   "

## 2023-10-10 ENCOUNTER — HOSPITAL ENCOUNTER (OUTPATIENT)
Dept: RADIATION ONCOLOGY | Facility: HOSPITAL | Age: 78
Discharge: HOME | End: 2023-10-10

## 2023-10-10 ENCOUNTER — HOSPITAL ENCOUNTER (OUTPATIENT)
Dept: RADIATION ONCOLOGY | Facility: HOSPITAL | Age: 78
Discharge: HOME | End: 2023-10-10
Payer: MEDICARE

## 2023-10-10 VITALS
DIASTOLIC BLOOD PRESSURE: 65 MMHG | OXYGEN SATURATION: 99 % | RESPIRATION RATE: 18 BRPM | TEMPERATURE: 98.2 F | HEART RATE: 74 BPM | SYSTOLIC BLOOD PRESSURE: 119 MMHG

## 2023-10-10 DIAGNOSIS — C34.91 NON-SMALL CELL CANCER OF RIGHT LUNG: Primary | ICD-10-CM

## 2023-10-10 LAB
RAD ONC ARIA COURSE ID: NORMAL
RAD ONC ARIA COURSE INTENT: NORMAL
RAD ONC ARIA COURSE LAST TREATMENT DATE: NORMAL
RAD ONC ARIA COURSE START DATE: NORMAL
RAD ONC ARIA COURSE TREATMENT ELAPSED DAYS: 0
RAD ONC ARIA FIRST TREATMENT DATE: NORMAL
RAD ONC ARIA PLAN FRACTIONS TREATED TO DATE: 1
RAD ONC ARIA PLAN ID: NORMAL
RAD ONC ARIA PLAN PRESCRIBED DOSE PER FRACTION: 2 GY
RAD ONC ARIA PLAN PRIMARY REFERENCE POINT: NORMAL
RAD ONC ARIA PLAN TOTAL FRACTIONS PRESCRIBED: 30
RAD ONC ARIA PLAN TOTAL PRESCRIBED DOSE: 6000 CGY
RAD ONC ARIA REFERENCE POINT DOSAGE GIVEN TO DATE: 2 GY
RAD ONC ARIA REFERENCE POINT ID: NORMAL
RAD ONC ARIA REFERENCE POINT SESSION DOSAGE GIVEN: 2 GY

## 2023-10-10 PROCEDURE — 77386: CPT | Performed by: RADIOLOGY

## 2023-10-10 PROCEDURE — 77336 RADIATION PHYSICS CONSULT: CPT | Performed by: RADIOLOGY

## 2023-10-10 PROCEDURE — 77014 CHG CT GUIDANCE RADIATION THERAPY FLDS PLACEMENT: CPT | Performed by: RADIOLOGY

## 2023-10-10 PROCEDURE — 77427 RADIATION TX MANAGEMENT X5: CPT | Performed by: RADIOLOGY

## 2023-10-10 NOTE — PROGRESS NOTES
On Treatment Visit     Patient: Mayra Hays   YOB: 1945   Medical Record Number: [id number]     Date of Visit  October 10, 2023   Primary Diagnosis:  Cancer Staging   Primary cancer of right upper lobe of lung  Staging form: Lung, AJCC 8th Edition  - Clinical stage from 9/12/2023: Stage IIIA (cT1c, cN2, cM0) - Signed by Shawna Bond MD on 9/12/2023      Ms. Hays was seen today for an on treatment visit. She is receiving radiation therapy to the right upper lobe lung.     Treatment Site Modality Dose per fraction (cGy) Fractions Total dose (cGy)   RUL VMAT  200 1 of 30 200 of 6000     Concurrent therapy: Yes    She is doing well she received her first treatment today.      Mayra Hays reports a pain score of 0 .    .    Vitals:    10/10/23 0948   BP: 119/65   Pulse: 74   Resp: 18   Temp: 98.2 øF (36.8 øC)   SpO2: 99%     Wt Readings from Last 3 Encounters:   10/09/23 101 kg (222 lb)   10/09/23 101 kg (222 lb 9.6 oz)   09/29/23 101 kg (223 lb 3.2 oz)     On exam, she is sitting up in no distress, breathing comfortably on room air.    Plan: I have reviewed treatment setup notes and checked and approved the daily guidance images. I reviewed dose delivery and treatment parameters and deemed them appropriate. Continue radiation as prescribed.    JOSE CARLOS Haley MD

## 2023-10-11 ENCOUNTER — LAB (OUTPATIENT)
Dept: ONCOLOGY | Facility: CLINIC | Age: 78
End: 2023-10-11
Payer: MEDICARE

## 2023-10-11 ENCOUNTER — OFFICE VISIT (OUTPATIENT)
Dept: ONCOLOGY | Facility: CLINIC | Age: 78
End: 2023-10-11
Payer: MEDICARE

## 2023-10-11 ENCOUNTER — HOSPITAL ENCOUNTER (OUTPATIENT)
Dept: RADIATION ONCOLOGY | Facility: HOSPITAL | Age: 78
Discharge: HOME | End: 2023-10-11
Payer: MEDICARE

## 2023-10-11 VITALS
SYSTOLIC BLOOD PRESSURE: 135 MMHG | RESPIRATION RATE: 18 BRPM | HEIGHT: 71 IN | OXYGEN SATURATION: 95 % | BODY MASS INDEX: 31.58 KG/M2 | HEART RATE: 72 BPM | WEIGHT: 225.6 LBS | DIASTOLIC BLOOD PRESSURE: 69 MMHG | TEMPERATURE: 97.5 F

## 2023-10-11 DIAGNOSIS — D50.9 IRON DEFICIENCY ANEMIA, UNSPECIFIED IRON DEFICIENCY ANEMIA TYPE: ICD-10-CM

## 2023-10-11 DIAGNOSIS — C34.11 PRIMARY CANCER OF RIGHT UPPER LOBE OF LUNG: ICD-10-CM

## 2023-10-11 DIAGNOSIS — D64.9 SYMPTOMATIC ANEMIA: ICD-10-CM

## 2023-10-11 DIAGNOSIS — C34.91 NON-SMALL CELL CANCER OF RIGHT LUNG: Primary | ICD-10-CM

## 2023-10-11 DIAGNOSIS — R91.8 LUNG MASS: ICD-10-CM

## 2023-10-11 DIAGNOSIS — K90.49 MALABSORPTION DUE TO INTOLERANCE, NOT ELSEWHERE CLASSIFIED: ICD-10-CM

## 2023-10-11 LAB
BASOPHILS # BLD AUTO: 0.1 10*3/MM3 (ref 0–0.2)
BASOPHILS NFR BLD AUTO: 0.9 % (ref 0–1.5)
DEPRECATED RDW RBC AUTO: 46.9 FL (ref 37–54)
EOSINOPHIL # BLD AUTO: 0.15 10*3/MM3 (ref 0–0.4)
EOSINOPHIL NFR BLD AUTO: 1.4 % (ref 0.3–6.2)
ERYTHROCYTE [DISTWIDTH] IN BLOOD BY AUTOMATED COUNT: 14.6 % (ref 12.3–15.4)
FERRITIN SERPL-MCNC: 119.2 NG/ML (ref 13–150)
FUNGUS WND CULT: NORMAL
HCT VFR BLD AUTO: 33.1 % (ref 34–46.6)
HGB BLD-MCNC: 9.6 G/DL (ref 12–15.9)
IMM GRANULOCYTES # BLD AUTO: 0.08 10*3/MM3 (ref 0–0.05)
IMM GRANULOCYTES NFR BLD AUTO: 0.7 % (ref 0–0.5)
IRON 24H UR-MRATE: 30 MCG/DL (ref 37–145)
IRON SATN MFR SERPL: 9 % (ref 20–50)
LYMPHOCYTES # BLD AUTO: 1.96 10*3/MM3 (ref 0.7–3.1)
LYMPHOCYTES NFR BLD AUTO: 17.7 % (ref 19.6–45.3)
MCH RBC QN AUTO: 25.4 PG (ref 26.6–33)
MCHC RBC AUTO-ENTMCNC: 29 G/DL (ref 31.5–35.7)
MCV RBC AUTO: 87.6 FL (ref 79–97)
MONOCYTES # BLD AUTO: 0.82 10*3/MM3 (ref 0.1–0.9)
MONOCYTES NFR BLD AUTO: 7.4 % (ref 5–12)
MYCOBACTERIUM SPEC CULT: NORMAL
NEUTROPHILS NFR BLD AUTO: 7.99 10*3/MM3 (ref 1.7–7)
NEUTROPHILS NFR BLD AUTO: 71.9 % (ref 42.7–76)
NIGHT BLUE STAIN TISS: NORMAL
NRBC BLD AUTO-RTO: 0 /100 WBC (ref 0–0.2)
PLATELET # BLD AUTO: 302 10*3/MM3 (ref 140–450)
PMV BLD AUTO: 10.3 FL (ref 6–12)
RAD ONC ARIA COURSE ID: NORMAL
RAD ONC ARIA COURSE INTENT: NORMAL
RAD ONC ARIA COURSE LAST TREATMENT DATE: NORMAL
RAD ONC ARIA COURSE START DATE: NORMAL
RAD ONC ARIA COURSE TREATMENT ELAPSED DAYS: 1
RAD ONC ARIA FIRST TREATMENT DATE: NORMAL
RAD ONC ARIA PLAN FRACTIONS TREATED TO DATE: 2
RAD ONC ARIA PLAN ID: NORMAL
RAD ONC ARIA PLAN PRESCRIBED DOSE PER FRACTION: 2 GY
RAD ONC ARIA PLAN PRIMARY REFERENCE POINT: NORMAL
RAD ONC ARIA PLAN TOTAL FRACTIONS PRESCRIBED: 30
RAD ONC ARIA PLAN TOTAL PRESCRIBED DOSE: 6000 CGY
RAD ONC ARIA REFERENCE POINT DOSAGE GIVEN TO DATE: 4 GY
RAD ONC ARIA REFERENCE POINT ID: NORMAL
RAD ONC ARIA REFERENCE POINT SESSION DOSAGE GIVEN: 2 GY
RBC # BLD AUTO: 3.78 10*6/MM3 (ref 3.77–5.28)
TIBC SERPL-MCNC: 352 MCG/DL (ref 298–536)
TRANSFERRIN SERPL-MCNC: 236 MG/DL (ref 200–360)
WBC NRBC COR # BLD: 11.1 10*3/MM3 (ref 3.4–10.8)

## 2023-10-11 PROCEDURE — 82728 ASSAY OF FERRITIN: CPT | Performed by: INTERNAL MEDICINE

## 2023-10-11 PROCEDURE — 77386: CPT | Performed by: RADIOLOGY

## 2023-10-11 PROCEDURE — 77014 CHG CT GUIDANCE RADIATION THERAPY FLDS PLACEMENT: CPT | Performed by: RADIOLOGY

## 2023-10-11 PROCEDURE — 85025 COMPLETE CBC W/AUTO DIFF WBC: CPT | Performed by: INTERNAL MEDICINE

## 2023-10-11 PROCEDURE — 83540 ASSAY OF IRON: CPT | Performed by: INTERNAL MEDICINE

## 2023-10-11 PROCEDURE — 84466 ASSAY OF TRANSFERRIN: CPT | Performed by: INTERNAL MEDICINE

## 2023-10-11 NOTE — PROGRESS NOTES
Venipuncture Blood Specimen Collection  Venipuncture performed in left arm by Leah Grimes MA with good hemostasis. Patient tolerated the procedure well without complications.   10/11/23   Leah Grimes MA

## 2023-10-11 NOTE — PROGRESS NOTES
Subjective     Date: 10/11/2023    Referring Provider  No ref. provider found    Chief Complaint  Symptomatic anemia   2. Non small Cell Lung Cancer  Cancer Staging   Stage IIIA (cT1c, cN2, cM0)        Subjective      Mayra Hays is a 78 y.o. female who presents today to [facility] for follow up.    HPI:   78 y.o. female with past medical history of diabetes mellitus type 2, hyperlipidemia, COPD, hypertension, atrial fibrillation on anticoagulation (Eliquis), h/o CVA and previous tobacco use presents for symptomatic anemia and NSCLC.    She is present today with her daughter, Hailey, who is assisting with history. Pt started work up for RUL lung nodule , since then noticed to have a decrease in her Hgb to 10.7 from normal in 2023. More recently, her Hgb /9 was 6.5, she was directed to ED where she received 1U PRBC.     Patient reports increased fatigue over the last several months. She reports no bleeding, however does report dark colored stool (melena) two days ago. Denies any other evidence of melena or hematochezia.     She has previously been diagnosed with iron deficiency (years ago) requiring oral iron supplements, but never received IV iron or bone marrow biopsy.    She and her daughter are unsure of her last colonoscopy and endoscopy history, think it may have occurred >5 years ago but unsure.    She denies recent weight loss, fever, chills,  night sweats.  Previous smoker, quit 5-6 years ago, smoked 3 packs/day X 30 years. Denies alcohol or drug use. Family history significant for son with leukemia, brothers with lung cancer.     Oncology History:  2023: low dose CT chest screenin.9 cm spiculated right upper lobe pulmonary nodule concerning foe malignancy, PET/CT recommended.   2023: EBUS by Dr. Peter negative for malignancy for bronchial washing and FNA of station 10R  2023: PET/CT: Right upper lobe pulmonary nodule, more  posterior possible satellite nodules are postobstructive process measuring 2.1 cm with mSUV 14.5. Also with pretracheal region lymph node 2.6 cm.   6/29/2023: CT guided needle biopsy was non diagnostic, showed fibrosis and chronic inflammation.   8/9/2023: Lab work showed anemia with Hgb 6.5. Referred to ED. Bronchoscopy deferred.   9/6/2023: Navigational bronchoscopy performed by Dr. Clemens- Right upper lobe lung transbronchial biopsy revealed squamous cell carcinoma, lymph node at station 4R also involved with squamous cell carcinoma. PD-L1 TPS 60%. Patient not deemed a surgical candidate.    Ms. Hays presents today with her daughter, grand daughter, and great grand daughters. She is in a wheelchair. She lives alone with her grand son, able to perform her ADLs including cooking, cleaning.     Treatment history:      Interval History 10/11/2023   Ms. Hays presents for follow up today, accompanied by her daughter in law. She started treatment with paclitaxel and carboplatin on 10/9, unfortunately had a anaphylactic reaction to taxol after 8 minutes of infusion causing her to go to the ED. Patient was awake and feeling back to herself at the time of ED visit. Carboplatin was not administered.     She reports doing well overall, no new symptoms. Started radiation therapy yesterday.      The following portions of the patient's history were reviewed and updated as appropriate: allergies, current medications, past family history, past medical history, past social history, past surgical history and problem list.    Objective     Objective     Allergy:   Allergies   Allergen Reactions    Paclitaxel Anaphylaxis        Current Medications:   Current Outpatient Medications   Medication Sig Dispense Refill    acetaminophen (TYLENOL) 325 MG tablet Take 2 tablets by mouth Every 4 (Four) Hours As Needed for Mild Pain. 30 tablet 0    albuterol sulfate  (90 Base) MCG/ACT inhaler Inhale 2 puffs Every 4 (Four) Hours As  Needed for Wheezing or Shortness of Air. 18 g 8    apixaban (ELIQUIS) 5 MG tablet tablet Take 1 tablet by mouth Every 12 (Twelve) Hours. 180 tablet 3    atorvastatin (LIPITOR) 20 MG tablet Take 1 tablet by mouth Daily. 90 tablet 3    Budeson-Glycopyrrol-Formoterol (Breztri Aerosphere) 160-9-4.8 MCG/ACT aerosol inhaler Inhale 2 puffs 2 (Two) Times a Day. 10.7 g 8    esomeprazole (nexIUM) 20 MG capsule Take 1 capsule by mouth Daily.      ibuprofen (ADVIL,MOTRIN) 600 MG tablet Take 1 tablet by mouth Every 6 (Six) Hours As Needed for Mild Pain. 30 tablet 0    ipratropium-albuterol (DUO-NEB) 0.5-2.5 mg/3 ml nebulizer USE 1 VIAL IN NEBULIZER 4 TIMES DAILY - and as needed 360 mL 11    ketorolac (TORADOL) 10 MG tablet Take 1 tablet by mouth Every 6 (Six) Hours As Needed for Moderate Pain. 20 tablet 0    lidocaine-prilocaine (EMLA) 2.5-2.5 % cream Apply to port-a-cath site 30 minutes prior to arrival at infusion center. Cover with plastic wrap. 30 g 1    lisinopril (PRINIVIL,ZESTRIL) 5 MG tablet Take 1 tablet by mouth Daily. 90 tablet 3    loratadine (CLARITIN) 10 MG tablet Take 1 tablet by mouth Daily As Needed for Allergies.      methocarbamol (Robaxin) 500 MG tablet Take 1 tablet by mouth 3 (Three) Times a Day As Needed for Muscle Spasms. 30 tablet 2    O2 (OXYGEN) Inhale 2 L/min As Needed.      ondansetron (Zofran) 8 MG tablet Take 1 tablet by mouth Every 8 (Eight) Hours As Needed for Nausea or Vomiting. 30 tablet 1    pantoprazole (PROTONIX) 40 MG EC tablet Take 1 tablet by mouth Daily As Needed (acid reflux).      potassium chloride (K-DUR,KLOR-CON) 20 MEQ CR tablet Take 1 tablet by mouth Daily.      prochlorperazine (COMPAZINE) 10 MG tablet Take 1 tablet by mouth Every 6 (Six) Hours As Needed for Nausea or Vomiting. 30 tablet 1    Stool Softener 100 MG capsule Take 1 capsule by mouth 2 (two) times a day.      topiramate (TOPAMAX) 25 MG tablet Take 1 tablet by mouth 2 (Two) Times a Day.      trimethoprim (TRIMPEX) 100  MG tablet Take 1 tablet by mouth 2 (Two) Times a Day. 30 tablet 1     No current facility-administered medications for this visit.       Past Medical History:  Past Medical History:   Diagnosis Date    Abnormal ECG     Arthritis     CHF (congestive heart failure)     Chronic kidney disease     Collapsed lung     Congenital heart disease     pt unsure of this    COPD (chronic obstructive pulmonary disease)     Coronary artery disease     Diabetes mellitus     TYPE 2    Elevated cholesterol     GERD (gastroesophageal reflux disease)     Heart valve disease     Hyperlipidemia     Hypertension     Myocardial infarction 2019    Primary cancer of right upper lobe of lung 9/12/2023    Sleep apnea     non compliant with CPAP    Stroke 2019    UTI (urinary tract infection)     Wears eyeglasses        Past Surgical History:  Past Surgical History:   Procedure Laterality Date    BRONCHOSCOPY Bilateral 02/27/2023    Procedure: BRONCHOSCOPY WITH ENDOBRONCHIAL ULTRASOUND;  Surgeon: Abdulkadir Peter MD;  Location: [facility] OR;  Service: Pulmonary;  Laterality: Bilateral;    BRONCHOSCOPY WITH ION ROBOTIC ASSIST N/A 9/6/2023    Procedure: BRONCHOSCOPY WITH ION ROBOT AND EBUS;  Surgeon: John Clemens MD;  Location:  [facility];  Service: Robotics - Pulmonary;  Laterality: N/A;  Ion cath #6 - 0032,  #6 - 0030, cath guide # 0077. EBUS scope removed with balloon intact.    CARDIAC CATHETERIZATION N/A 08/22/2017    Procedure: Left Heart Cath;  Surgeon: Jatinder Matias MD;  [facility]:  [facility];  Service:     CARDIAC CATHETERIZATION N/A 11/22/2021    Procedure: Left Heart Cath;  Surgeon: Tolu Steinberg MD;  Location:  [facility];  Service: Cardiology;  Laterality: N/A;    COLONOSCOPY      ENDOSCOPY      GALLBLADDER SURGERY      PORTACATH PLACEMENT N/A 9/29/2023    Procedure: INSERTION OF PORTACATH;  Surgeon: Petr Velásquez MD;  Location:  [facility];  Service: General;  Laterality:  "N/A;    VENTRAL HERNIA REPAIR N/A 2020    Procedure: VENTRAL HERNIA REPAIR LAPAROSCOPIC WITH DAVINCI ROBOT;  Surgeon: Petr Velásquze MD;  [facility];  [facility];  Laterality: N/A;       Social History:  Social History     Socioeconomic History    Marital status: Single    Number of children: 6   Tobacco Use    Smoking status: Former     Packs/day: 3.00     Years: 55.00     Additional pack years: 0.00     Total pack years: 165.00     Types: Cigarettes     Start date: 4/3/1998     Quit date: 2015     Years since quittin.7    Smokeless tobacco: Never   Vaping Use    Vaping Use: Never used   Substance and Sexual Activity    Alcohol use: No    Drug use: No    Sexual activity: Never         Family History:  Family History   Problem Relation Age of Onset    Heart disease Mother         Rhianna sophia    Heart disease Father     Heart attack Father     Heart failure Father        Review of Systems:  Review of Systems   Constitutional:  Positive for fatigue.   Respiratory:  Positive for cough and shortness of breath.    All other systems reviewed and are negative.      Vital Signs:   /69   Pulse 72   Temp 97.5 øF (36.4 øC)   Resp 18   Ht 180.3 cm (71\")   Wt 102 kg (225 lb 9.6 oz)   SpO2 95%   BMI 31.46 kg/mý      Physical Exam:  Physical Exam  Vitals reviewed.   Constitutional:       General: She is not in acute distress.     Appearance: Normal appearance. She is obese. She is not ill-appearing.      Comments: +hard of hearing   HENT:      Head: Normocephalic and atraumatic.      Mouth/Throat:      Mouth: Mucous membranes are moist.      Pharynx: Oropharynx is clear.   Eyes:      Conjunctiva/sclera: Conjunctivae normal.      Pupils: Pupils are equal, round, and reactive to light.   Cardiovascular:      Rate and Rhythm: Normal rate and regular rhythm.      Heart sounds: No murmur heard.  Pulmonary:      Effort: Pulmonary effort is normal. No respiratory distress.      Breath " "sounds: Wheezing present.   Abdominal:      General: Abdomen is flat. Bowel sounds are normal. There is no distension.      Palpations: Abdomen is soft. There is no mass.      Tenderness: There is no abdominal tenderness. There is no guarding.   Musculoskeletal:         General: No swelling. Normal range of motion.      Cervical back: Normal range of motion.   Lymphadenopathy:      Cervical: No cervical adenopathy.   Skin:     General: Skin is warm and dry.   Neurological:      General: No focal deficit present.      Mental Status: She is alert and oriented to person, place, and time. Mental status is at baseline.   Psychiatric:         Mood and Affect: Mood normal.         PHQ-9 Score  PHQ-9 Total Score:       Pain Score  Vitals:    10/11/23 1122   BP: 135/69   Pulse: 72   Resp: 18   Temp: 97.5 øF (36.4 øC)   SpO2: 95%   Weight: 102 kg (225 lb 9.6 oz)   Height: 180.3 cm (71\")   PainSc: 0-No pain         ECOG score: 1           PAINSCOREQUALITYMETRIC:   Vitals:    10/11/23 1122   PainSc: 0-No pain            Lab Review  Lab Results   Component Value Date    WBC 11.10 (H) 10/11/2023    HGB 9.6 (L) 10/11/2023    HCT 33.1 (L) 10/11/2023    MCV 87.6 10/11/2023    RDW 14.6 10/11/2023     10/11/2023    NEUTRORELPCT 71.9 10/11/2023    LYMPHORELPCT 17.7 (L) 10/11/2023    MONORELPCT 7.4 10/11/2023    EOSRELPCT 1.4 10/11/2023    BASORELPCT 0.9 10/11/2023    NEUTROABS 7.99 (H) 10/11/2023    LYMPHSABS 1.96 10/11/2023     Lab Results   Component Value Date     10/09/2023    K 4.0 10/09/2023    CO2 27.7 10/09/2023     10/09/2023    BUN 13 10/09/2023    CREATININE 0.82 10/09/2023    EGFRIFNONA 51 (L) 11/22/2021    GLUCOSE 185 (H) 10/09/2023    CALCIUM 9.3 10/09/2023    ALKPHOS 153 (H) 10/09/2023    AST 15 10/09/2023    ALT 12 10/09/2023    BILITOT 0.3 10/09/2023    ALBUMIN 4.0 10/09/2023    PROTEINTOT 7.5 10/09/2023    MG 2.0 10/09/2023    PHOS 3.5 05/18/2020     Lab Results   Component Value Date    RETICCTPCT " 4.26 (H) 08/16/2023     Lab Results   Component Value Date    FERRITIN 119.20 10/11/2023    IRON 30 (L) 10/11/2023    TIBC 352 10/11/2023    LABIRON 9 (L) 10/11/2023    [id number] 548 08/14/2023    FOLATE 12.90 08/14/2023        Radiology Results  CT Angiogram Chest Pulmonary Embolism (10/09/2023 16:41)   MPRESSION:  1.  Right upper lobe mass in association with pneumonia has increased in  size and extent when compared to the previous exam with extension to the  pleural surface. Masslike component is approximately 56.8 x 44.5 mm and  was previously 40.1 x 36.7 mm. This would correspond to primary  malignancy.  2.  Central bronchial wall thickening. Can be seen with reactive airway  disease or bronchitis.  3.  1.3 cm left adrenal nodule is noted. No significant change from  previous.  4.  Increased size of paratracheal and mediastinal lymph nodes. A right  paratracheal lymph node is 2.5 cm and was previously 2.2 cm.  5. No PE identified.  6. Marked cardiomegaly, stable.  7. Otherwise stable chest with other nonacute/incidental findings as  above.    CT Head Without Contrast (10/09/2023 12:00)   IMPRESSION:    Unremarkable exam demonstrating no CT evidence of acute intracranial  findings.      CT Chest Without Contrast Diagnostic (08/09/2023 12:38)       NM PET/CT Skull Base to Mid Thigh (06/20/2023 11:12)         CT Chest Low Dose Cancer Screening WO (02/14/2023 12:58)   IMPRESSION:    Interval development or enlargement of a 1.9 cm spiculated right upper  lobe pulmonary nodule concerning in appearance for malignancy and PET/CT  is recommended for further evaluation.        Pathology:     Tissue Pathology Exam (09/06/2023 08:13)         6/29/23           Assessment / Plan         Assessment and Plan   Mayra Hays is a 78 y.o. year old presents for       Non small cell lung cancer   Cancer Staging   Stage IIIA (cT1c, cN2, cM0)  -Oncology history as above. Patient with 1.9 cm spiculated right upper lobe nodule on low  dose CT chest screen (2/2023). EBUS by Dr. Peter negative for malignancy (2/2023). PET/CT with hypermetabolism RUL 2.1 cm and pretracheal region lymph node (6/2023). CT guided biopsy negative for malignancy (6/2023). Navigational bronchoscopy with positive RUL and  4R (paratracheal) for squamous cell (9/6/2023). PD-L1 TPS score 60%.   -Patient not deemed for surgical resection. We reviewed her staging and treatment options which include concurrent chemo-radiation followed by adjuvant immunotherapy. Chemotherapy agents to be used would include weekly carboplatin AUC2 and paclitaxel 50 mg/m2. Chemo will be followed by one year of Durvalumab based on PACIFIC trial.   -Patient experienced anaphylaxis 8 minutes into the paclitaxel on first cycle 10/9, carboplatin was not administered. Due to this, paclitaxel will be omitted from weekly chemotherapy.  -Radiation therapy started 10/10; 6000 cGy in 30 treatment fractions       2. Anemia; suspicious for iron deficiency anemia due to GIB  - Patient with normal H/H in our system 2/2023, with acute drop in Hgb 6/29 (10.7) to Hgb (6.5) on 8/9 requiring transfusion. Patient reports one episode of melena two days ago (she is a poor historian).   -Last colonoscopy and endoscopy are unknown; think it may have been >5 years ago  -CBC from 8/14 show Hgb 8.2, Hct 27, MCV 90. Normal WBC and platelet count. Iron studies with normal iron (37), TIBC (435), and low iron saturation (9). This is in light of recent blood transfusion. Normal folate and B12 level. Reticulocyte count noted to be elevated to 6%  -Patient is with fatigue. Denies shortness of breath (aside from baseline COPD) or chest pain. Continues to be on Eliquis for Afib  -Initial work up from consultation confirmed iron deficiency anemia, normal folate/b12 levels. No indication of hemolysis seen with normal LDH and haptoglobin. Myeloma work up has been negative with no M spike on serum protein electrophoresis and normal k/l  free light chain ratio. Peripheral smear with normal total WBC without granulocytic dysplasia or blasts.  -Received Ferumoxytol 8/29/2023    3. Malignancy associated pain  - Currently denies        Discussed possible differential diagnoses, testing, treatment, recommended non-pharmacological interventions, risks, warning signs to monitor for that would indicate need for follow-up in clinic or ER. If no improvement with these regimens or you have new or worsening symptoms follow-up. Patient verbalizes understanding and agreement with plan of care. Denies further needs or concerns.     Patient was given instructions and counseling regarding her condition and for health maintenance advised.       All questions were answered to her satisfaction.              Meds ordered during this visit  No orders of the defined types were placed in this encounter.      Visit Diagnoses    ICD-10-CM ICD-9-CM   1. Non-small cell cancer of right lung  C34.91 162.9   2. Symptomatic anemia  D64.9 285.9   3. Iron deficiency anemia, unspecified iron deficiency anemia type  D50.9 280.9         Follow Up   In 2 weeks with weekly carboplatin         This document has been electronically signed by Shawna Bond MD   October 11, 2023 11:58 EDT    Dictated Utilizing Dragon Dictation: Part of this note may be an electronic transcription/translation of spoken language to printed text using the Dragon Dictation System.

## 2023-10-12 ENCOUNTER — OFFICE VISIT (OUTPATIENT)
Dept: PULMONOLOGY | Facility: CLINIC | Age: 78
End: 2023-10-12
Payer: MEDICARE

## 2023-10-12 ENCOUNTER — HOSPITAL ENCOUNTER (OUTPATIENT)
Dept: RADIATION ONCOLOGY | Facility: HOSPITAL | Age: 78
Discharge: HOME | End: 2023-10-12
Payer: MEDICARE

## 2023-10-12 VITALS
HEIGHT: 71 IN | SYSTOLIC BLOOD PRESSURE: 148 MMHG | DIASTOLIC BLOOD PRESSURE: 82 MMHG | OXYGEN SATURATION: 84 % | BODY MASS INDEX: 31.22 KG/M2 | WEIGHT: 223 LBS | HEART RATE: 80 BPM | TEMPERATURE: 97.7 F

## 2023-10-12 DIAGNOSIS — Z87.891 FORMER SMOKER: ICD-10-CM

## 2023-10-12 DIAGNOSIS — J96.11 CHRONIC RESPIRATORY FAILURE WITH HYPOXIA: ICD-10-CM

## 2023-10-12 DIAGNOSIS — C34.91 NON-SMALL CELL CANCER OF RIGHT LUNG: Primary | ICD-10-CM

## 2023-10-12 DIAGNOSIS — C34.91 NON-SMALL CELL CANCER OF RIGHT LUNG: ICD-10-CM

## 2023-10-12 DIAGNOSIS — C34.11 PRIMARY CANCER OF RIGHT UPPER LOBE OF LUNG: ICD-10-CM

## 2023-10-12 DIAGNOSIS — J44.9 CHRONIC OBSTRUCTIVE PULMONARY DISEASE, UNSPECIFIED COPD TYPE: Primary | ICD-10-CM

## 2023-10-12 LAB
RAD ONC ARIA COURSE ID: NORMAL
RAD ONC ARIA COURSE INTENT: NORMAL
RAD ONC ARIA COURSE LAST TREATMENT DATE: NORMAL
RAD ONC ARIA COURSE START DATE: NORMAL
RAD ONC ARIA COURSE TREATMENT ELAPSED DAYS: 2
RAD ONC ARIA FIRST TREATMENT DATE: NORMAL
RAD ONC ARIA PLAN FRACTIONS TREATED TO DATE: 3
RAD ONC ARIA PLAN ID: NORMAL
RAD ONC ARIA PLAN PRESCRIBED DOSE PER FRACTION: 2 GY
RAD ONC ARIA PLAN PRIMARY REFERENCE POINT: NORMAL
RAD ONC ARIA PLAN TOTAL FRACTIONS PRESCRIBED: 30
RAD ONC ARIA PLAN TOTAL PRESCRIBED DOSE: 6000 CGY
RAD ONC ARIA REFERENCE POINT DOSAGE GIVEN TO DATE: 6 GY
RAD ONC ARIA REFERENCE POINT ID: NORMAL
RAD ONC ARIA REFERENCE POINT SESSION DOSAGE GIVEN: 2 GY

## 2023-10-12 PROCEDURE — 3077F SYST BP >= 140 MM HG: CPT | Performed by: PHYSICIAN ASSISTANT

## 2023-10-12 PROCEDURE — 77386: CPT | Performed by: RADIOLOGY

## 2023-10-12 PROCEDURE — 99214 OFFICE O/P EST MOD 30 MIN: CPT | Performed by: PHYSICIAN ASSISTANT

## 2023-10-12 PROCEDURE — 77014 CHG CT GUIDANCE RADIATION THERAPY FLDS PLACEMENT: CPT | Performed by: RADIOLOGY

## 2023-10-12 PROCEDURE — 3079F DIAST BP 80-89 MM HG: CPT | Performed by: PHYSICIAN ASSISTANT

## 2023-10-12 RX ORDER — DIPHENHYDRAMINE HYDROCHLORIDE 50 MG/ML
50 INJECTION INTRAMUSCULAR; INTRAVENOUS AS NEEDED
OUTPATIENT
Start: 2023-10-16

## 2023-10-12 RX ORDER — PALONOSETRON 0.05 MG/ML
0.25 INJECTION, SOLUTION INTRAVENOUS ONCE
OUTPATIENT
Start: 2023-10-16

## 2023-10-12 RX ORDER — FAMOTIDINE 10 MG/ML
20 INJECTION, SOLUTION INTRAVENOUS AS NEEDED
OUTPATIENT
Start: 2023-10-16

## 2023-10-12 RX ORDER — SODIUM CHLORIDE 9 MG/ML
250 INJECTION, SOLUTION INTRAVENOUS ONCE
OUTPATIENT
Start: 2023-10-16

## 2023-10-12 NOTE — PROGRESS NOTES
"Chief Complaint  COPD    Subjective        Mayra Hays presents to [facility]ICINE  History of Present Illness    Mrs. Hays presents today along with her daughter.  COPD symptoms are stable at this time.  Was notable for initial desaturation upon rooming, but improved back to the 90s while seated.  We have been trying to obtain a POC device, but could not receive it until several months after tank use.  It is hard for her to ambulate to doctors appointments and other activities with the tanks.  Still using supplemental oxygen on as-needed basis, and at nighttime on 2 L.  Remains compliant with breztri and rescue medications as needed.  Does not report any complaints with her breathing at this time, feels like she is doing well from a respiratory standpoint otherwise.  After multiple biopsies, navigational bronchoscopy with biopsy was notable for squamous of carcinoma with lymph node involvement.  Has tolerated the chemotherapy well other than the first reaction to 1 of the 2 medications.  Also undergoing radiation.      Objective   Vital Signs:  /82   Pulse 80   Temp 97.7 °F (36.5 °C) (Temporal)   Ht 180.3 cm (71\")   Wt 101 kg (223 lb)   SpO2 (!) 84%   BMI 31.10 kg/m²   Estimated body mass index is 31.1 kg/m² as calculated from the following:    Height as of this encounter: 180.3 cm (71\").    Weight as of this encounter: 101 kg (223 lb).          Physical Exam  Vitals reviewed.   Constitutional:       General: She is not in acute distress.     Appearance: She is well-developed. She is not diaphoretic.   HENT:      Head: Normocephalic and atraumatic.   Cardiovascular:      Rate and Rhythm: Normal rate and regular rhythm.   Pulmonary:      Effort: Pulmonary effort is normal.      Breath sounds: No wheezing, rhonchi or rales.   Neurological:      Mental Status: She is alert and oriented to person, place, and time.   Psychiatric:         Behavior: Behavior " normal.        Result Review :  The following data was reviewed by: Sabiha Valadez PA-C on 10/12/2023:      Reviewed the CT chest imaging and report From October 2023.  Reviewed the CT chest imaging and report from August 2023.  Comparison below -August left, October right.           Assessment and Plan   Diagnoses and all orders for this visit:    1. Chronic obstructive pulmonary disease, unspecified COPD type (Primary)    2. Chronic respiratory failure with hypoxia    3. Former smoker    4. Non-small cell cancer of right lung        COPD, history of cigarette dependence:  Continue albuterol inhaler as needed  Continue DuoNebs as needed  Continue Breztri as scheduled      Chronic hypoxic respiratory failure:  Continues with intermittent requirements of supplemental oxygen.  Tolerating room air today.  Typically uses 2 L at nighttime and as needed.  Still has not received POC device yet, and would likely benefit from easier ambulatory use when needed, especially traveling further distances due to recharge ability.  Thoughts that she would previously qualified for it after duration of tank use.  We will check again with DME company  Did not previously tolerate the NIV device.      Small cell carcinoma of the right upper lobe, pulmonary nodules:  Previously notable for change in density left base since 2019, smaller nodule the right lung since 2017, and small calcified granuloma in the right lung since 2017.  On more recent imaging within the last year, she was notable for increase in size of the right upper lobe nodule from 5 mm to 2 cm.  Initial biopsy was notable for Klebsiella and was treated appropriately.  Secondary biopsy was notable for fibrotic tissues and chronic inflammation.  PET scan was completed, and the nodule was hypermetabolic.  Repeat biopsy again completed by Dr. Clemesn, notable for squamous cell carcinoma with lymph node involvement.  Has been undergoing radiation and chemotherapy, but had a  reaction to the first tried chemo medication, and now only on carboplatin.  Further imaging recommendations at this time per oncology  Will follow along for continued monitoring of other pulmonary nodules  As the recent CT from early October suggested right upper lobe masses that she was with pneumonia that had increased in size, can consider bronchoscopy if needed if respiratory symptoms progress.    Currently has no acute complaints  We will consider further evaluation as needed.        Follow Up   Return in about 3 months (around 1/12/2024).  Patient was given instructions and counseling regarding her condition or for health maintenance advice. Please see specific information pulled into the AVS if appropriate.

## 2023-10-16 ENCOUNTER — HOSPITAL ENCOUNTER (OUTPATIENT)
Dept: RADIATION ONCOLOGY | Facility: HOSPITAL | Age: 78
Discharge: HOME | End: 2023-10-16
Payer: MEDICARE

## 2023-10-16 LAB
RAD ONC ARIA COURSE ID: NORMAL
RAD ONC ARIA COURSE INTENT: NORMAL
RAD ONC ARIA COURSE LAST TREATMENT DATE: NORMAL
RAD ONC ARIA COURSE START DATE: NORMAL
RAD ONC ARIA COURSE TREATMENT ELAPSED DAYS: 6
RAD ONC ARIA FIRST TREATMENT DATE: NORMAL
RAD ONC ARIA PLAN FRACTIONS TREATED TO DATE: 4
RAD ONC ARIA PLAN ID: NORMAL
RAD ONC ARIA PLAN PRESCRIBED DOSE PER FRACTION: 2 GY
RAD ONC ARIA PLAN PRIMARY REFERENCE POINT: NORMAL
RAD ONC ARIA PLAN TOTAL FRACTIONS PRESCRIBED: 30
RAD ONC ARIA PLAN TOTAL PRESCRIBED DOSE: 6000 CGY
RAD ONC ARIA REFERENCE POINT DOSAGE GIVEN TO DATE: 8 GY
RAD ONC ARIA REFERENCE POINT ID: NORMAL
RAD ONC ARIA REFERENCE POINT SESSION DOSAGE GIVEN: 2 GY

## 2023-10-16 PROCEDURE — 77014 CHG CT GUIDANCE RADIATION THERAPY FLDS PLACEMENT: CPT | Performed by: RADIOLOGY

## 2023-10-16 PROCEDURE — 77386: CPT | Performed by: RADIOLOGY

## 2023-10-17 ENCOUNTER — HOSPITAL ENCOUNTER (OUTPATIENT)
Dept: RADIATION ONCOLOGY | Facility: HOSPITAL | Age: 78
Discharge: HOME | End: 2023-10-17

## 2023-10-17 ENCOUNTER — HOSPITAL ENCOUNTER (OUTPATIENT)
Dept: RADIATION ONCOLOGY | Facility: HOSPITAL | Age: 78
Discharge: HOME | End: 2023-10-17
Payer: MEDICARE

## 2023-10-17 VITALS
DIASTOLIC BLOOD PRESSURE: 63 MMHG | RESPIRATION RATE: 18 BRPM | HEART RATE: 70 BPM | OXYGEN SATURATION: 93 % | TEMPERATURE: 98 F | SYSTOLIC BLOOD PRESSURE: 157 MMHG

## 2023-10-17 DIAGNOSIS — C34.91 NON-SMALL CELL CANCER OF RIGHT LUNG: Primary | ICD-10-CM

## 2023-10-17 LAB
RAD ONC ARIA COURSE ID: NORMAL
RAD ONC ARIA COURSE INTENT: NORMAL
RAD ONC ARIA COURSE LAST TREATMENT DATE: NORMAL
RAD ONC ARIA COURSE START DATE: NORMAL
RAD ONC ARIA COURSE TREATMENT ELAPSED DAYS: 7
RAD ONC ARIA FIRST TREATMENT DATE: NORMAL
RAD ONC ARIA PLAN FRACTIONS TREATED TO DATE: 5
RAD ONC ARIA PLAN ID: NORMAL
RAD ONC ARIA PLAN PRESCRIBED DOSE PER FRACTION: 2 GY
RAD ONC ARIA PLAN PRIMARY REFERENCE POINT: NORMAL
RAD ONC ARIA PLAN TOTAL FRACTIONS PRESCRIBED: 30
RAD ONC ARIA PLAN TOTAL PRESCRIBED DOSE: 6000 CGY
RAD ONC ARIA REFERENCE POINT DOSAGE GIVEN TO DATE: 10 GY
RAD ONC ARIA REFERENCE POINT ID: NORMAL
RAD ONC ARIA REFERENCE POINT SESSION DOSAGE GIVEN: 2 GY

## 2023-10-17 PROCEDURE — 77014 CHG CT GUIDANCE RADIATION THERAPY FLDS PLACEMENT: CPT | Performed by: RADIOLOGY

## 2023-10-17 PROCEDURE — 77386: CPT | Performed by: RADIOLOGY

## 2023-10-17 NOTE — PROGRESS NOTES
On Treatment Visit     Patient: Mayra Hays   YOB: 1945   Medical Record Number: [id number]     Date of Visit  October 17, 2023   Primary Diagnosis:  Cancer Staging   Primary cancer of right upper lobe of lung  Staging form: Lung, AJCC 8th Edition  - Clinical stage from 9/12/2023: Stage IIIA (cT1c, cN2, cM0) - Signed by Shawna Bond MD on 9/12/2023      Ms. Hays was seen today for an on treatment visit. She is receiving radiation therapy to the RUL lung.     Treatment Site Modality Dose per fraction (cGy) Fractions Total dose (cGy)   RUL lung VMAT 200 5 of 30 1000 6000     Concurrent therapy: No    She is doing very well today.  She is still early in treatment however she states her breathing is improving.    Pain Score    10/17/23 1552   PainSc: 0-No pain     Mayra Hays reports a pain score of 0.  Given her pain assessment as noted, treatment options were discussed and the following options were decided upon as a follow-up plan to address the patient's pain: continuation of current treatment plan for pain.    Vitals:    10/17/23 1552   BP: 157/63   Pulse: 70   Resp: 18   Temp: 98 °F (36.7 °C)   SpO2: 93%     Wt Readings from Last 3 Encounters:   10/12/23 101 kg (223 lb)   10/11/23 102 kg (225 lb 9.6 oz)   10/09/23 101 kg (222 lb)     On exam, she is sitting up in no distress, breathing comfortably on room air.     Plan: I have reviewed treatment setup notes and checked and approved the daily guidance images. I reviewed dose delivery and treatment parameters and deemed them appropriate. Continue radiation as prescribed.    JOSE CARLOS Haley MD

## 2023-10-18 ENCOUNTER — HOSPITAL ENCOUNTER (OUTPATIENT)
Dept: RADIATION ONCOLOGY | Facility: HOSPITAL | Age: 78
Discharge: HOME | End: 2023-10-18

## 2023-10-18 ENCOUNTER — LAB (OUTPATIENT)
Dept: ONCOLOGY | Facility: HOSPITAL | Age: 78
End: 2023-10-18
Payer: MEDICARE

## 2023-10-18 ENCOUNTER — INFUSION (OUTPATIENT)
Dept: ONCOLOGY | Facility: HOSPITAL | Age: 78
End: 2023-10-18
Payer: MEDICARE

## 2023-10-18 DIAGNOSIS — C34.91 NON-SMALL CELL CANCER OF RIGHT LUNG: ICD-10-CM

## 2023-10-18 DIAGNOSIS — C34.11 PRIMARY CANCER OF RIGHT UPPER LOBE OF LUNG: Primary | ICD-10-CM

## 2023-10-18 DIAGNOSIS — Z95.828 PORT-A-CATH IN PLACE: ICD-10-CM

## 2023-10-18 DIAGNOSIS — C34.11 PRIMARY CANCER OF RIGHT UPPER LOBE OF LUNG: ICD-10-CM

## 2023-10-18 LAB
ALBUMIN SERPL-MCNC: 3.5 G/DL (ref 3.5–5.2)
ALBUMIN/GLOB SERPL: 1.1 G/DL
ALP SERPL-CCNC: 128 U/L (ref 39–117)
ALT SERPL W P-5'-P-CCNC: 8 U/L (ref 1–33)
ANION GAP SERPL CALCULATED.3IONS-SCNC: 8.2 MMOL/L (ref 5–15)
ANISOCYTOSIS BLD QL: NORMAL
AST SERPL-CCNC: 10 U/L (ref 1–32)
BASOPHILS # BLD AUTO: 0.05 10*3/MM3 (ref 0–0.2)
BASOPHILS NFR BLD AUTO: 0.8 % (ref 0–1.5)
BILIRUB SERPL-MCNC: <0.2 MG/DL (ref 0–1.2)
BUN SERPL-MCNC: 13 MG/DL (ref 8–23)
BUN/CREAT SERPL: 16.5 (ref 7–25)
CALCIUM SPEC-SCNC: 8.5 MG/DL (ref 8.6–10.5)
CHLORIDE SERPL-SCNC: 104 MMOL/L (ref 98–107)
CO2 SERPL-SCNC: 25.8 MMOL/L (ref 22–29)
CREAT SERPL-MCNC: 0.79 MG/DL (ref 0.57–1)
DACRYOCYTES BLD QL SMEAR: NORMAL
DEPRECATED RDW RBC AUTO: 44.6 FL (ref 37–54)
EGFRCR SERPLBLD CKD-EPI 2021: 77.2 ML/MIN/1.73
EOSINOPHIL # BLD AUTO: 0.45 10*3/MM3 (ref 0–0.4)
EOSINOPHIL NFR BLD AUTO: 7.1 % (ref 0.3–6.2)
ERYTHROCYTE [DISTWIDTH] IN BLOOD BY AUTOMATED COUNT: 14.2 % (ref 12.3–15.4)
FUNGUS WND CULT: NORMAL
GLOBULIN UR ELPH-MCNC: 3.3 GM/DL
GLUCOSE SERPL-MCNC: 240 MG/DL (ref 65–99)
HCT VFR BLD AUTO: 30.4 % (ref 34–46.6)
HGB BLD-MCNC: 8.8 G/DL (ref 12–15.9)
HYPOCHROMIA BLD QL: NORMAL
IMM GRANULOCYTES # BLD AUTO: 0.06 10*3/MM3 (ref 0–0.05)
IMM GRANULOCYTES NFR BLD AUTO: 0.9 % (ref 0–0.5)
LYMPHOCYTES # BLD AUTO: 1.12 10*3/MM3 (ref 0.7–3.1)
LYMPHOCYTES NFR BLD AUTO: 17.6 % (ref 19.6–45.3)
MCH RBC QN AUTO: 24.9 PG (ref 26.6–33)
MCHC RBC AUTO-ENTMCNC: 28.9 G/DL (ref 31.5–35.7)
MCV RBC AUTO: 85.9 FL (ref 79–97)
MONOCYTES # BLD AUTO: 0.54 10*3/MM3 (ref 0.1–0.9)
MONOCYTES NFR BLD AUTO: 8.5 % (ref 5–12)
MYCOBACTERIUM SPEC CULT: NORMAL
NEUTROPHILS NFR BLD AUTO: 4.13 10*3/MM3 (ref 1.7–7)
NEUTROPHILS NFR BLD AUTO: 65.1 % (ref 42.7–76)
NIGHT BLUE STAIN TISS: NORMAL
NRBC BLD AUTO-RTO: 0 /100 WBC (ref 0–0.2)
PLAT MORPH BLD: NORMAL
PLATELET # BLD AUTO: 322 10*3/MM3 (ref 140–450)
PMV BLD AUTO: 10.5 FL (ref 6–12)
POTASSIUM SERPL-SCNC: 3.5 MMOL/L (ref 3.5–5.2)
PROT SERPL-MCNC: 6.8 G/DL (ref 6–8.5)
RAD ONC ARIA COURSE ID: NORMAL
RAD ONC ARIA COURSE INTENT: NORMAL
RAD ONC ARIA COURSE LAST TREATMENT DATE: NORMAL
RAD ONC ARIA COURSE START DATE: NORMAL
RAD ONC ARIA COURSE TREATMENT ELAPSED DAYS: 8
RAD ONC ARIA FIRST TREATMENT DATE: NORMAL
RAD ONC ARIA PLAN FRACTIONS TREATED TO DATE: 6
RAD ONC ARIA PLAN ID: NORMAL
RAD ONC ARIA PLAN PRESCRIBED DOSE PER FRACTION: 2 GY
RAD ONC ARIA PLAN PRIMARY REFERENCE POINT: NORMAL
RAD ONC ARIA PLAN TOTAL FRACTIONS PRESCRIBED: 30
RAD ONC ARIA PLAN TOTAL PRESCRIBED DOSE: 6000 CGY
RAD ONC ARIA REFERENCE POINT DOSAGE GIVEN TO DATE: 12 GY
RAD ONC ARIA REFERENCE POINT ID: NORMAL
RAD ONC ARIA REFERENCE POINT SESSION DOSAGE GIVEN: 2 GY
RBC # BLD AUTO: 3.54 10*6/MM3 (ref 3.77–5.28)
SODIUM SERPL-SCNC: 138 MMOL/L (ref 136–145)
WBC NRBC COR # BLD: 6.35 10*3/MM3 (ref 3.4–10.8)

## 2023-10-18 PROCEDURE — 25810000003 SODIUM CHLORIDE 0.9 % SOLUTION: Performed by: INTERNAL MEDICINE

## 2023-10-18 PROCEDURE — 25010000002 CALCIUM GLUCONATE-NACL 1-0.675 GM/50ML-% SOLUTION: Performed by: NURSE PRACTITIONER

## 2023-10-18 PROCEDURE — 77427 RADIATION TX MANAGEMENT X5: CPT | Performed by: RADIOLOGY

## 2023-10-18 PROCEDURE — 96375 TX/PRO/DX INJ NEW DRUG ADDON: CPT

## 2023-10-18 PROCEDURE — 25010000002 CARBOPLATIN PER 50 MG: Performed by: INTERNAL MEDICINE

## 2023-10-18 PROCEDURE — 96413 CHEMO IV INFUSION 1 HR: CPT

## 2023-10-18 PROCEDURE — 77014 CHG CT GUIDANCE RADIATION THERAPY FLDS PLACEMENT: CPT | Performed by: RADIOLOGY

## 2023-10-18 PROCEDURE — 25010000002 PALONOSETRON 0.25 MG/5ML SOLUTION PREFILLED SYRINGE: Performed by: INTERNAL MEDICINE

## 2023-10-18 PROCEDURE — 25010000002 DEXAMETHASONE SODIUM PHOSPHATE 20 MG/5ML SOLUTION: Performed by: INTERNAL MEDICINE

## 2023-10-18 PROCEDURE — 85025 COMPLETE CBC W/AUTO DIFF WBC: CPT

## 2023-10-18 PROCEDURE — 96368 THER/DIAG CONCURRENT INF: CPT

## 2023-10-18 PROCEDURE — 80053 COMPREHEN METABOLIC PANEL: CPT

## 2023-10-18 PROCEDURE — 25010000002 HEPARIN LOCK FLUSH PER 10 UNITS: Performed by: NURSE PRACTITIONER

## 2023-10-18 PROCEDURE — 85007 BL SMEAR W/DIFF WBC COUNT: CPT

## 2023-10-18 PROCEDURE — 77386: CPT | Performed by: RADIOLOGY

## 2023-10-18 PROCEDURE — 96367 TX/PROPH/DG ADDL SEQ IV INF: CPT

## 2023-10-18 RX ORDER — HEPARIN SODIUM (PORCINE) LOCK FLUSH IV SOLN 100 UNIT/ML 100 UNIT/ML
300 SOLUTION INTRAVENOUS ONCE
OUTPATIENT
Start: 2023-10-18

## 2023-10-18 RX ORDER — HEPARIN SODIUM (PORCINE) LOCK FLUSH IV SOLN 100 UNIT/ML 100 UNIT/ML
500 SOLUTION INTRAVENOUS AS NEEDED
Status: DISCONTINUED | OUTPATIENT
Start: 2023-10-18 | End: 2023-10-18 | Stop reason: HOSPADM

## 2023-10-18 RX ORDER — HEPARIN SODIUM (PORCINE) LOCK FLUSH IV SOLN 100 UNIT/ML 100 UNIT/ML
500 SOLUTION INTRAVENOUS AS NEEDED
OUTPATIENT
Start: 2023-10-18

## 2023-10-18 RX ORDER — SODIUM CHLORIDE 0.9 % (FLUSH) 0.9 %
10 SYRINGE (ML) INJECTION AS NEEDED
OUTPATIENT
Start: 2023-10-18

## 2023-10-18 RX ORDER — HEPARIN SODIUM (PORCINE) LOCK FLUSH IV SOLN 100 UNIT/ML 100 UNIT/ML
300 SOLUTION INTRAVENOUS ONCE
Status: CANCELLED | OUTPATIENT
Start: 2023-10-18

## 2023-10-18 RX ORDER — SODIUM CHLORIDE 0.9 % (FLUSH) 0.9 %
20 SYRINGE (ML) INJECTION AS NEEDED
OUTPATIENT
Start: 2023-10-18

## 2023-10-18 RX ORDER — SODIUM CHLORIDE 9 MG/ML
250 INJECTION, SOLUTION INTRAVENOUS ONCE
Status: COMPLETED | OUTPATIENT
Start: 2023-10-18 | End: 2023-10-18

## 2023-10-18 RX ORDER — SODIUM CHLORIDE 0.9 % (FLUSH) 0.9 %
10 SYRINGE (ML) INJECTION AS NEEDED
Status: DISCONTINUED | OUTPATIENT
Start: 2023-10-18 | End: 2023-10-18 | Stop reason: HOSPADM

## 2023-10-18 RX ORDER — SODIUM CHLORIDE 0.9 % (FLUSH) 0.9 %
20 SYRINGE (ML) INJECTION AS NEEDED
Status: CANCELLED | OUTPATIENT
Start: 2023-10-18

## 2023-10-18 RX ORDER — CALCIUM GLUCONATE 20 MG/ML
1000 INJECTION, SOLUTION INTRAVENOUS ONCE
Status: COMPLETED | OUTPATIENT
Start: 2023-10-18 | End: 2023-10-18

## 2023-10-18 RX ORDER — PALONOSETRON 0.05 MG/ML
0.25 INJECTION, SOLUTION INTRAVENOUS ONCE
Status: COMPLETED | OUTPATIENT
Start: 2023-10-18 | End: 2023-10-18

## 2023-10-18 RX ADMIN — DEXAMETHASONE SODIUM PHOSPHATE 12 MG: 4 INJECTION, SOLUTION INTRA-ARTICULAR; INTRALESIONAL; INTRAMUSCULAR; INTRAVENOUS; SOFT TISSUE at 14:40

## 2023-10-18 RX ADMIN — CARBOPLATIN 210 MG: 10 INJECTION INTRAVENOUS at 15:20

## 2023-10-18 RX ADMIN — Medication 500 UNITS: at 16:16

## 2023-10-18 RX ADMIN — CALCIUM GLUCONATE 1000 MG: 20 INJECTION, SOLUTION INTRAVENOUS at 15:03

## 2023-10-18 RX ADMIN — PALONOSETRON 0.25 MG: 0.25 INJECTION, SOLUTION INTRAVENOUS at 14:40

## 2023-10-18 RX ADMIN — Medication 10 ML: at 16:16

## 2023-10-18 RX ADMIN — SODIUM CHLORIDE 250 ML: 9 INJECTION, SOLUTION INTRAVENOUS at 14:40

## 2023-10-19 ENCOUNTER — HOSPITAL ENCOUNTER (OUTPATIENT)
Dept: RADIATION ONCOLOGY | Facility: HOSPITAL | Age: 78
Discharge: HOME | End: 2023-10-19

## 2023-10-19 LAB
RAD ONC ARIA COURSE ID: NORMAL
RAD ONC ARIA COURSE INTENT: NORMAL
RAD ONC ARIA COURSE LAST TREATMENT DATE: NORMAL
RAD ONC ARIA COURSE START DATE: NORMAL
RAD ONC ARIA COURSE TREATMENT ELAPSED DAYS: 9
RAD ONC ARIA FIRST TREATMENT DATE: NORMAL
RAD ONC ARIA PLAN FRACTIONS TREATED TO DATE: 7
RAD ONC ARIA PLAN ID: NORMAL
RAD ONC ARIA PLAN PRESCRIBED DOSE PER FRACTION: 2 GY
RAD ONC ARIA PLAN PRIMARY REFERENCE POINT: NORMAL
RAD ONC ARIA PLAN TOTAL FRACTIONS PRESCRIBED: 30
RAD ONC ARIA PLAN TOTAL PRESCRIBED DOSE: 6000 CGY
RAD ONC ARIA REFERENCE POINT DOSAGE GIVEN TO DATE: 14 GY
RAD ONC ARIA REFERENCE POINT ID: NORMAL
RAD ONC ARIA REFERENCE POINT SESSION DOSAGE GIVEN: 2 GY

## 2023-10-19 PROCEDURE — 77014 CHG CT GUIDANCE RADIATION THERAPY FLDS PLACEMENT: CPT | Performed by: RADIOLOGY

## 2023-10-19 PROCEDURE — 77336 RADIATION PHYSICS CONSULT: CPT | Performed by: RADIOLOGY

## 2023-10-19 PROCEDURE — 77386: CPT | Performed by: RADIOLOGY

## 2023-10-20 ENCOUNTER — HOSPITAL ENCOUNTER (OUTPATIENT)
Dept: RADIATION ONCOLOGY | Facility: HOSPITAL | Age: 78
Discharge: HOME | End: 2023-10-20

## 2023-10-20 LAB
RAD ONC ARIA COURSE ID: NORMAL
RAD ONC ARIA COURSE INTENT: NORMAL
RAD ONC ARIA COURSE LAST TREATMENT DATE: NORMAL
RAD ONC ARIA COURSE START DATE: NORMAL
RAD ONC ARIA COURSE TREATMENT ELAPSED DAYS: 10
RAD ONC ARIA FIRST TREATMENT DATE: NORMAL
RAD ONC ARIA PLAN FRACTIONS TREATED TO DATE: 8
RAD ONC ARIA PLAN ID: NORMAL
RAD ONC ARIA PLAN PRESCRIBED DOSE PER FRACTION: 2 GY
RAD ONC ARIA PLAN PRIMARY REFERENCE POINT: NORMAL
RAD ONC ARIA PLAN TOTAL FRACTIONS PRESCRIBED: 30
RAD ONC ARIA PLAN TOTAL PRESCRIBED DOSE: 6000 CGY
RAD ONC ARIA REFERENCE POINT DOSAGE GIVEN TO DATE: 16 GY
RAD ONC ARIA REFERENCE POINT ID: NORMAL
RAD ONC ARIA REFERENCE POINT SESSION DOSAGE GIVEN: 2 GY

## 2023-10-20 PROCEDURE — 77386: CPT | Performed by: RADIOLOGY

## 2023-10-20 PROCEDURE — 77014 CHG CT GUIDANCE RADIATION THERAPY FLDS PLACEMENT: CPT | Performed by: RADIOLOGY

## 2023-10-23 ENCOUNTER — HOSPITAL ENCOUNTER (OUTPATIENT)
Dept: RADIATION ONCOLOGY | Facility: HOSPITAL | Age: 78
Discharge: HOME | End: 2023-10-23
Payer: MEDICARE

## 2023-10-23 LAB
RAD ONC ARIA COURSE ID: NORMAL
RAD ONC ARIA COURSE INTENT: NORMAL
RAD ONC ARIA COURSE LAST TREATMENT DATE: NORMAL
RAD ONC ARIA COURSE START DATE: NORMAL
RAD ONC ARIA COURSE TREATMENT ELAPSED DAYS: 13
RAD ONC ARIA FIRST TREATMENT DATE: NORMAL
RAD ONC ARIA PLAN FRACTIONS TREATED TO DATE: 9
RAD ONC ARIA PLAN ID: NORMAL
RAD ONC ARIA PLAN PRESCRIBED DOSE PER FRACTION: 2 GY
RAD ONC ARIA PLAN PRIMARY REFERENCE POINT: NORMAL
RAD ONC ARIA PLAN TOTAL FRACTIONS PRESCRIBED: 30
RAD ONC ARIA PLAN TOTAL PRESCRIBED DOSE: 6000 CGY
RAD ONC ARIA REFERENCE POINT DOSAGE GIVEN TO DATE: 18 GY
RAD ONC ARIA REFERENCE POINT ID: NORMAL
RAD ONC ARIA REFERENCE POINT SESSION DOSAGE GIVEN: 2 GY

## 2023-10-23 PROCEDURE — 77386: CPT | Performed by: RADIOLOGY

## 2023-10-23 PROCEDURE — 77014 CHG CT GUIDANCE RADIATION THERAPY FLDS PLACEMENT: CPT | Performed by: RADIOLOGY

## 2023-10-24 ENCOUNTER — INFUSION (OUTPATIENT)
Dept: ONCOLOGY | Facility: HOSPITAL | Age: 78
End: 2023-10-24
Payer: MEDICARE

## 2023-10-24 ENCOUNTER — HOSPITAL ENCOUNTER (OUTPATIENT)
Dept: RADIATION ONCOLOGY | Facility: HOSPITAL | Age: 78
Discharge: HOME | End: 2023-10-24

## 2023-10-24 ENCOUNTER — LAB (OUTPATIENT)
Dept: ONCOLOGY | Facility: HOSPITAL | Age: 78
End: 2023-10-24
Payer: MEDICARE

## 2023-10-24 ENCOUNTER — OFFICE VISIT (OUTPATIENT)
Dept: ONCOLOGY | Facility: CLINIC | Age: 78
End: 2023-10-24
Payer: MEDICARE

## 2023-10-24 VITALS
DIASTOLIC BLOOD PRESSURE: 95 MMHG | TEMPERATURE: 97.9 F | SYSTOLIC BLOOD PRESSURE: 104 MMHG | OXYGEN SATURATION: 95 % | HEART RATE: 104 BPM | RESPIRATION RATE: 18 BRPM

## 2023-10-24 VITALS
HEART RATE: 104 BPM | OXYGEN SATURATION: 95 % | DIASTOLIC BLOOD PRESSURE: 77 MMHG | TEMPERATURE: 97.9 F | SYSTOLIC BLOOD PRESSURE: 142 MMHG | RESPIRATION RATE: 18 BRPM

## 2023-10-24 VITALS
SYSTOLIC BLOOD PRESSURE: 142 MMHG | TEMPERATURE: 97.9 F | HEART RATE: 104 BPM | RESPIRATION RATE: 18 BRPM | OXYGEN SATURATION: 95 % | DIASTOLIC BLOOD PRESSURE: 77 MMHG

## 2023-10-24 DIAGNOSIS — C34.91 NON-SMALL CELL CANCER OF RIGHT LUNG: Primary | ICD-10-CM

## 2023-10-24 DIAGNOSIS — C34.91 NON-SMALL CELL CANCER OF RIGHT LUNG: ICD-10-CM

## 2023-10-24 DIAGNOSIS — K90.49 MALABSORPTION DUE TO INTOLERANCE, NOT ELSEWHERE CLASSIFIED: ICD-10-CM

## 2023-10-24 DIAGNOSIS — T45.1X5D CHEMOTHERAPY ADVERSE REACTION, SUBSEQUENT ENCOUNTER: ICD-10-CM

## 2023-10-24 DIAGNOSIS — D50.9 IRON DEFICIENCY ANEMIA, UNSPECIFIED IRON DEFICIENCY ANEMIA TYPE: ICD-10-CM

## 2023-10-24 DIAGNOSIS — C34.11 PRIMARY CANCER OF RIGHT UPPER LOBE OF LUNG: ICD-10-CM

## 2023-10-24 DIAGNOSIS — Z95.828 PORT-A-CATH IN PLACE: Primary | ICD-10-CM

## 2023-10-24 LAB
ALBUMIN SERPL-MCNC: 3.7 G/DL (ref 3.5–5.2)
ALBUMIN/GLOB SERPL: 1.2 G/DL
ALP SERPL-CCNC: 124 U/L (ref 39–117)
ALT SERPL W P-5'-P-CCNC: 9 U/L (ref 1–33)
ANION GAP SERPL CALCULATED.3IONS-SCNC: 9.8 MMOL/L (ref 5–15)
AST SERPL-CCNC: 13 U/L (ref 1–32)
BASOPHILS # BLD AUTO: 0.04 10*3/MM3 (ref 0–0.2)
BASOPHILS NFR BLD AUTO: 0.5 % (ref 0–1.5)
BILIRUB SERPL-MCNC: 0.2 MG/DL (ref 0–1.2)
BUN SERPL-MCNC: 12 MG/DL (ref 8–23)
BUN/CREAT SERPL: 15 (ref 7–25)
CALCIUM SPEC-SCNC: 9.1 MG/DL (ref 8.6–10.5)
CHLORIDE SERPL-SCNC: 103 MMOL/L (ref 98–107)
CO2 SERPL-SCNC: 28.2 MMOL/L (ref 22–29)
CREAT SERPL-MCNC: 0.8 MG/DL (ref 0.57–1)
DEPRECATED RDW RBC AUTO: 44.4 FL (ref 37–54)
EGFRCR SERPLBLD CKD-EPI 2021: 76 ML/MIN/1.73
EOSINOPHIL # BLD AUTO: 0.3 10*3/MM3 (ref 0–0.4)
EOSINOPHIL NFR BLD AUTO: 3.8 % (ref 0.3–6.2)
ERYTHROCYTE [DISTWIDTH] IN BLOOD BY AUTOMATED COUNT: 14.3 % (ref 12.3–15.4)
GLOBULIN UR ELPH-MCNC: 3.1 GM/DL
GLUCOSE SERPL-MCNC: 214 MG/DL (ref 65–99)
HCT VFR BLD AUTO: 33.9 % (ref 34–46.6)
HGB BLD-MCNC: 9.9 G/DL (ref 12–15.9)
IMM GRANULOCYTES # BLD AUTO: 0.06 10*3/MM3 (ref 0–0.05)
IMM GRANULOCYTES NFR BLD AUTO: 0.8 % (ref 0–0.5)
LYMPHOCYTES # BLD AUTO: 0.96 10*3/MM3 (ref 0.7–3.1)
LYMPHOCYTES NFR BLD AUTO: 12.2 % (ref 19.6–45.3)
MCH RBC QN AUTO: 25.1 PG (ref 26.6–33)
MCHC RBC AUTO-ENTMCNC: 29.2 G/DL (ref 31.5–35.7)
MCV RBC AUTO: 85.8 FL (ref 79–97)
MONOCYTES # BLD AUTO: 0.48 10*3/MM3 (ref 0.1–0.9)
MONOCYTES NFR BLD AUTO: 6.1 % (ref 5–12)
NEUTROPHILS NFR BLD AUTO: 6.01 10*3/MM3 (ref 1.7–7)
NEUTROPHILS NFR BLD AUTO: 76.6 % (ref 42.7–76)
NRBC BLD AUTO-RTO: 0 /100 WBC (ref 0–0.2)
PLATELET # BLD AUTO: 321 10*3/MM3 (ref 140–450)
PMV BLD AUTO: 10.8 FL (ref 6–12)
POTASSIUM SERPL-SCNC: 4 MMOL/L (ref 3.5–5.2)
PROT SERPL-MCNC: 6.8 G/DL (ref 6–8.5)
RAD ONC ARIA COURSE ID: NORMAL
RAD ONC ARIA COURSE INTENT: NORMAL
RAD ONC ARIA COURSE LAST TREATMENT DATE: NORMAL
RAD ONC ARIA COURSE START DATE: NORMAL
RAD ONC ARIA COURSE TREATMENT ELAPSED DAYS: 14
RAD ONC ARIA FIRST TREATMENT DATE: NORMAL
RAD ONC ARIA PLAN FRACTIONS TREATED TO DATE: 10
RAD ONC ARIA PLAN ID: NORMAL
RAD ONC ARIA PLAN PRESCRIBED DOSE PER FRACTION: 2 GY
RAD ONC ARIA PLAN PRIMARY REFERENCE POINT: NORMAL
RAD ONC ARIA PLAN TOTAL FRACTIONS PRESCRIBED: 30
RAD ONC ARIA PLAN TOTAL PRESCRIBED DOSE: 6000 CGY
RAD ONC ARIA REFERENCE POINT DOSAGE GIVEN TO DATE: 20 GY
RAD ONC ARIA REFERENCE POINT ID: NORMAL
RAD ONC ARIA REFERENCE POINT SESSION DOSAGE GIVEN: 2 GY
RBC # BLD AUTO: 3.95 10*6/MM3 (ref 3.77–5.28)
SODIUM SERPL-SCNC: 141 MMOL/L (ref 136–145)
WBC NRBC COR # BLD: 7.85 10*3/MM3 (ref 3.4–10.8)

## 2023-10-24 PROCEDURE — 25010000002 CARBOPLATIN PER 50 MG: Performed by: NURSE PRACTITIONER

## 2023-10-24 PROCEDURE — 25010000002 PALONOSETRON 0.25 MG/5ML SOLUTION PREFILLED SYRINGE: Performed by: NURSE PRACTITIONER

## 2023-10-24 PROCEDURE — 25810000003 SODIUM CHLORIDE 0.9 % SOLUTION: Performed by: NURSE PRACTITIONER

## 2023-10-24 PROCEDURE — 25010000002 HEPARIN LOCK FLUSH PER 10 UNITS: Performed by: NURSE PRACTITIONER

## 2023-10-24 PROCEDURE — 85025 COMPLETE CBC W/AUTO DIFF WBC: CPT

## 2023-10-24 PROCEDURE — 96413 CHEMO IV INFUSION 1 HR: CPT

## 2023-10-24 PROCEDURE — 77014 CHG CT GUIDANCE RADIATION THERAPY FLDS PLACEMENT: CPT | Performed by: RADIOLOGY

## 2023-10-24 PROCEDURE — 77386: CPT | Performed by: RADIOLOGY

## 2023-10-24 PROCEDURE — 80053 COMPREHEN METABOLIC PANEL: CPT

## 2023-10-24 PROCEDURE — 25010000002 DEXAMETHASONE SODIUM PHOSPHATE 20 MG/5ML SOLUTION: Performed by: NURSE PRACTITIONER

## 2023-10-24 PROCEDURE — 96375 TX/PRO/DX INJ NEW DRUG ADDON: CPT

## 2023-10-24 RX ORDER — SODIUM CHLORIDE 9 MG/ML
250 INJECTION, SOLUTION INTRAVENOUS ONCE
OUTPATIENT
Start: 2023-11-08

## 2023-10-24 RX ORDER — PALONOSETRON 0.05 MG/ML
0.25 INJECTION, SOLUTION INTRAVENOUS ONCE
Status: COMPLETED | OUTPATIENT
Start: 2023-10-24 | End: 2023-10-24

## 2023-10-24 RX ORDER — DIPHENHYDRAMINE HYDROCHLORIDE 50 MG/ML
50 INJECTION INTRAMUSCULAR; INTRAVENOUS AS NEEDED
OUTPATIENT
Start: 2023-11-08

## 2023-10-24 RX ORDER — FAMOTIDINE 10 MG/ML
20 INJECTION, SOLUTION INTRAVENOUS AS NEEDED
Status: CANCELLED | OUTPATIENT
Start: 2023-10-25

## 2023-10-24 RX ORDER — HEPARIN SODIUM (PORCINE) LOCK FLUSH IV SOLN 100 UNIT/ML 100 UNIT/ML
300 SOLUTION INTRAVENOUS ONCE
OUTPATIENT
Start: 2023-10-24

## 2023-10-24 RX ORDER — SODIUM CHLORIDE 9 MG/ML
250 INJECTION, SOLUTION INTRAVENOUS ONCE
OUTPATIENT
Start: 2023-11-01

## 2023-10-24 RX ORDER — DIPHENHYDRAMINE HYDROCHLORIDE 50 MG/ML
50 INJECTION INTRAMUSCULAR; INTRAVENOUS AS NEEDED
Status: CANCELLED | OUTPATIENT
Start: 2023-10-25

## 2023-10-24 RX ORDER — PALONOSETRON 0.05 MG/ML
0.25 INJECTION, SOLUTION INTRAVENOUS ONCE
OUTPATIENT
Start: 2023-11-01

## 2023-10-24 RX ORDER — PALONOSETRON 0.05 MG/ML
0.25 INJECTION, SOLUTION INTRAVENOUS ONCE
OUTPATIENT
Start: 2023-11-08

## 2023-10-24 RX ORDER — DIPHENHYDRAMINE HYDROCHLORIDE 50 MG/ML
50 INJECTION INTRAMUSCULAR; INTRAVENOUS AS NEEDED
OUTPATIENT
Start: 2023-11-15

## 2023-10-24 RX ORDER — FAMOTIDINE 10 MG/ML
20 INJECTION, SOLUTION INTRAVENOUS AS NEEDED
OUTPATIENT
Start: 2023-11-01

## 2023-10-24 RX ORDER — HEPARIN SODIUM (PORCINE) LOCK FLUSH IV SOLN 100 UNIT/ML 100 UNIT/ML
500 SOLUTION INTRAVENOUS AS NEEDED
Status: DISCONTINUED | OUTPATIENT
Start: 2023-10-24 | End: 2023-10-24 | Stop reason: HOSPADM

## 2023-10-24 RX ORDER — DIPHENHYDRAMINE HYDROCHLORIDE 50 MG/ML
50 INJECTION INTRAMUSCULAR; INTRAVENOUS AS NEEDED
OUTPATIENT
Start: 2023-11-01

## 2023-10-24 RX ORDER — SODIUM CHLORIDE 9 MG/ML
250 INJECTION, SOLUTION INTRAVENOUS ONCE
Status: COMPLETED | OUTPATIENT
Start: 2023-10-24 | End: 2023-10-24

## 2023-10-24 RX ORDER — SODIUM CHLORIDE 0.9 % (FLUSH) 0.9 %
10 SYRINGE (ML) INJECTION AS NEEDED
Status: DISCONTINUED | OUTPATIENT
Start: 2023-10-24 | End: 2023-10-24 | Stop reason: HOSPADM

## 2023-10-24 RX ORDER — SODIUM CHLORIDE 0.9 % (FLUSH) 0.9 %
20 SYRINGE (ML) INJECTION AS NEEDED
OUTPATIENT
Start: 2023-10-24

## 2023-10-24 RX ORDER — SODIUM CHLORIDE 0.9 % (FLUSH) 0.9 %
10 SYRINGE (ML) INJECTION AS NEEDED
OUTPATIENT
Start: 2023-10-24

## 2023-10-24 RX ORDER — FAMOTIDINE 10 MG/ML
20 INJECTION, SOLUTION INTRAVENOUS AS NEEDED
OUTPATIENT
Start: 2023-11-15

## 2023-10-24 RX ORDER — SODIUM CHLORIDE 9 MG/ML
250 INJECTION, SOLUTION INTRAVENOUS ONCE
OUTPATIENT
Start: 2023-11-15

## 2023-10-24 RX ORDER — FAMOTIDINE 10 MG/ML
20 INJECTION, SOLUTION INTRAVENOUS AS NEEDED
OUTPATIENT
Start: 2023-11-08

## 2023-10-24 RX ORDER — PALONOSETRON 0.05 MG/ML
0.25 INJECTION, SOLUTION INTRAVENOUS ONCE
OUTPATIENT
Start: 2023-11-15

## 2023-10-24 RX ORDER — HEPARIN SODIUM (PORCINE) LOCK FLUSH IV SOLN 100 UNIT/ML 100 UNIT/ML
500 SOLUTION INTRAVENOUS AS NEEDED
OUTPATIENT
Start: 2023-10-24

## 2023-10-24 RX ADMIN — PALONOSETRON 0.25 MG: 0.25 INJECTION, SOLUTION INTRAVENOUS at 13:26

## 2023-10-24 RX ADMIN — Medication 10 ML: at 14:31

## 2023-10-24 RX ADMIN — Medication 500 UNITS: at 14:31

## 2023-10-24 RX ADMIN — CARBOPLATIN 200 MG: 600 INJECTION, SOLUTION INTRAVENOUS at 13:59

## 2023-10-24 RX ADMIN — DEXAMETHASONE SODIUM PHOSPHATE 12 MG: 4 INJECTION, SOLUTION INTRA-ARTICULAR; INTRALESIONAL; INTRAMUSCULAR; INTRAVENOUS; SOFT TISSUE at 13:26

## 2023-10-24 RX ADMIN — SODIUM CHLORIDE 250 ML: 9 INJECTION, SOLUTION INTRAVENOUS at 13:27

## 2023-10-24 NOTE — PROGRESS NOTES
Subjective     Date: 10/24/2023    Referring Provider  No ref. provider found    Chief Complaint  Symptomatic anemia   2. Non small Cell Lung Cancer  Cancer Staging   Stage IIIA (cT1c, cN2, cM0)        Subjective      Mayra Hays is a 78 y.o. female who presents today to [facility] for follow up.    HPI:   78 y.o. female with past medical history of diabetes mellitus type 2, hyperlipidemia, COPD, hypertension, atrial fibrillation on anticoagulation (Eliquis), h/o CVA and previous tobacco use presents for symptomatic anemia and NSCLC.    She is present today with her daughter, Hailey, who is assisting with history. Pt started work up for RUL lung nodule , since then noticed to have a decrease in her Hgb to 10.7 from normal in 2023. More recently, her Hgb 8/9 was 6.5, she was directed to ED where she received 1U PRBC.     Patient reports increased fatigue over the last several months. She reports no bleeding, however does report dark colored stool (melena) two days ago. Denies any other evidence of melena or hematochezia.     She has previously been diagnosed with iron deficiency (years ago) requiring oral iron supplements, but never received IV iron or bone marrow biopsy.    She and her daughter are unsure of her last colonoscopy and endoscopy history, think it may have occurred >5 years ago but unsure.    She denies recent weight loss, fever, chills,  night sweats.  Previous smoker, quit 5-6 years ago, smoked 3 packs/day X 30 years. Denies alcohol or drug use. Family history significant for son with leukemia, brothers with lung cancer.     Oncology History:  2023: low dose CT chest screenin.9 cm spiculated right upper lobe pulmonary nodule concerning foe malignancy, PET/CT recommended.   2023: EBUS by Dr. Peter negative for malignancy for bronchial washing and FNA of station 10R  2023: PET/CT: Right upper lobe pulmonary nodule, more  posterior possible satellite nodules are postobstructive process measuring 2.1 cm with mSUV 14.5. Also with pretracheal region lymph node 2.6 cm.   6/29/2023: CT guided needle biopsy was non diagnostic, showed fibrosis and chronic inflammation.   8/9/2023: Lab work showed anemia with Hgb 6.5. Referred to ED. Bronchoscopy deferred.   9/6/2023: Navigational bronchoscopy performed by Dr. Clemens- Right upper lobe lung transbronchial biopsy revealed squamous cell carcinoma, lymph node at station 4R also involved with squamous cell carcinoma. PD-L1 TPS 60%. Patient not deemed a surgical candidate.    Ms. Hays presents today with her daughter, grand daughter, and great grand daughters. She is in a wheelchair. She lives alone with her grand son, able to perform her ADLs including cooking, cleaning.     Treatment history:        Interval History 10/11/2023   Ms. Hays presents for follow up today, accompanied by her daughter in law. She started treatment with paclitaxel and carboplatin on 10/9, unfortunately had a anaphylactic reaction to taxol after 8 minutes of infusion causing her to go to the ED. Patient was awake and feeling back to herself at the time of ED visit. Carboplatin was not administered.     She reports doing well overall, no new symptoms. Started radiation therapy yesterday.      Interval History 10/24/2023   Ms. Hays presents for cycle 2 of carboplatin with concurrent radiation. Did well with first cycle. Denies any adverse effects including nausea, vomiting, diarrhea, neuropathy. Appetite is good. Radiation is going well. No complaints today.  Denies any skin rash or bites.    The following portions of the patient's history were reviewed and updated as appropriate: allergies, current medications, past family history, past medical history, past social history, past surgical history and problem list.    Objective     Objective     Allergy:   Allergies   Allergen Reactions    Paclitaxel Anaphylaxis         Current Medications:   Current Outpatient Medications   Medication Sig Dispense Refill    acetaminophen (TYLENOL) 325 MG tablet Take 2 tablets by mouth Every 4 (Four) Hours As Needed for Mild Pain. 30 tablet 0    albuterol sulfate  (90 Base) MCG/ACT inhaler Inhale 2 puffs Every 4 (Four) Hours As Needed for Wheezing or Shortness of Air. 18 g 8    apixaban (ELIQUIS) 5 MG tablet tablet Take 1 tablet by mouth Every 12 (Twelve) Hours. 180 tablet 3    atorvastatin (LIPITOR) 20 MG tablet Take 1 tablet by mouth Daily. 90 tablet 3    Budeson-Glycopyrrol-Formoterol (Breztri Aerosphere) 160-9-4.8 MCG/ACT aerosol inhaler Inhale 2 puffs 2 (Two) Times a Day. 10.7 g 8    esomeprazole (nexIUM) 20 MG capsule Take 1 capsule by mouth Daily.      ibuprofen (ADVIL,MOTRIN) 600 MG tablet Take 1 tablet by mouth Every 6 (Six) Hours As Needed for Mild Pain. 30 tablet 0    ipratropium-albuterol (DUO-NEB) 0.5-2.5 mg/3 ml nebulizer USE 1 VIAL IN NEBULIZER 4 TIMES DAILY - and as needed 360 mL 11    ketorolac (TORADOL) 10 MG tablet Take 1 tablet by mouth Every 6 (Six) Hours As Needed for Moderate Pain. 20 tablet 0    lidocaine-prilocaine (EMLA) 2.5-2.5 % cream Apply to port-a-cath site 30 minutes prior to arrival at infusion center. Cover with plastic wrap. 30 g 1    lisinopril (PRINIVIL,ZESTRIL) 5 MG tablet Take 1 tablet by mouth Daily. 90 tablet 3    loratadine (CLARITIN) 10 MG tablet Take 1 tablet by mouth Daily As Needed for Allergies.      methocarbamol (Robaxin) 500 MG tablet Take 1 tablet by mouth 3 (Three) Times a Day As Needed for Muscle Spasms. 30 tablet 2    O2 (OXYGEN) Inhale 2 L/min As Needed.      ondansetron (Zofran) 8 MG tablet Take 1 tablet by mouth Every 8 (Eight) Hours As Needed for Nausea or Vomiting. 30 tablet 1    pantoprazole (PROTONIX) 40 MG EC tablet Take 1 tablet by mouth Daily As Needed (acid reflux).      potassium chloride (K-DUR,KLOR-CON) 20 MEQ CR tablet Take 1 tablet by mouth Daily.      prochlorperazine  (COMPAZINE) 10 MG tablet Take 1 tablet by mouth Every 6 (Six) Hours As Needed for Nausea or Vomiting. 30 tablet 1    Stool Softener 100 MG capsule Take 1 capsule by mouth 2 (two) times a day.      topiramate (TOPAMAX) 25 MG tablet Take 1 tablet by mouth 2 (Two) Times a Day.      trimethoprim (TRIMPEX) 100 MG tablet Take 1 tablet by mouth 2 (Two) Times a Day. 30 tablet 1     No current facility-administered medications for this visit.     Facility-Administered Medications Ordered in Other Visits   Medication Dose Route Frequency Provider Last Rate Last Admin    CARBOplatin (PARAPLATIN) 200 mg in sodium chloride 0.9 % 120 mL chemo IVPB  200 mg Intravenous Once Bailee Gan APRN        dexAMETHasone (DECADRON) IVPB 12 mg  12 mg Intravenous Once Bailee Gan APRN        heparin injection 500 Units  500 Units Intravenous PRN Bailee Gan APRN        palonosetron (ALOXI) injection 0.25 mg  0.25 mg Intravenous Once Bailee Gan APRN        sodium chloride 0.9 % flush 10 mL  10 mL Intravenous PRN Bailee Gan APRN        sodium chloride 0.9 % infusion 250 mL  250 mL Intravenous Once Bailee Gan APRN           Past Medical History:  Past Medical History:   Diagnosis Date    Abnormal ECG     Arthritis     CHF (congestive heart failure)     Chronic kidney disease     Collapsed lung     Congenital heart disease     pt unsure of this    COPD (chronic obstructive pulmonary disease)     Coronary artery disease     Diabetes mellitus     TYPE 2    Elevated cholesterol     GERD (gastroesophageal reflux disease)     Heart valve disease     Hyperlipidemia     Hypertension     Myocardial infarction 2019    Primary cancer of right upper lobe of lung 9/12/2023    Sleep apnea     non compliant with CPAP    Stroke 2019    UTI (urinary tract infection)     Wears eyeglasses        Past Surgical History:  Past Surgical History:   Procedure Laterality Date    BRONCHOSCOPY Bilateral  2023    Procedure: BRONCHOSCOPY WITH ENDOBRONCHIAL ULTRASOUND;  Surgeon: Abdulkadir Peter MD;  Location:  [facility];  Service: Pulmonary;  Laterality: Bilateral;    BRONCHOSCOPY WITH ION ROBOTIC ASSIST N/A 2023    Procedure: BRONCHOSCOPY WITH ION ROBOT AND EBUS;  Surgeon: John Clemens MD;  Location:  [facility];  Service: Robotics - Pulmonary;  Laterality: N/A;  Ion cath #6 - 0032,  #6 - 0030, cath guide # 0077. EBUS scope removed with balloon intact.    CARDIAC CATHETERIZATION N/A 2017    Procedure: Left Heart Cath;  Surgeon: Jatinder Matias MD;  Location:  [facility];  Service:     CARDIAC CATHETERIZATION N/A 2021    Procedure: Left Heart Cath;  Surgeon: Tolu Steinberg MD;  Location:  [facility];  Service: Cardiology;  Laterality: N/A;    COLONOSCOPY      ENDOSCOPY      GALLBLADDER SURGERY      PORTACATH PLACEMENT N/A 2023    Procedure: INSERTION OF PORTACATH;  Surgeon: Petr Velásquez MD;  Location:  [facility];  Service: General;  Laterality: N/A;    VENTRAL HERNIA REPAIR N/A 2020    Procedure: VENTRAL HERNIA REPAIR LAPAROSCOPIC WITH DAVINCI ROBOT;  Surgeon: Petr Velásquez MD;  Location:  [facility];  Service: DaVinci;  Laterality: N/A;       Social History:  Social History     Socioeconomic History    Marital status: Single    Number of children: 6   Tobacco Use    Smoking status: Former     Packs/day: 3     Types: Cigarettes     Start date: 4/3/1998     Quit date: 2015     Years since quittin.8     Passive exposure: Past    Smokeless tobacco: Never   Vaping Use    Vaping Use: Never used   Substance and Sexual Activity    Alcohol use: No    Drug use: No    Sexual activity: Never         Family History:  Family History   Problem Relation Age of Onset    Heart disease Mother         Rhianna sophia    Heart disease Father     Heart attack Father     Heart failure Father        Review of Systems:  Review of Systems    Constitutional:  Positive for fatigue.   Respiratory:  Positive for cough and shortness of breath.    All other systems reviewed and are negative.      Vital Signs:   /77   Pulse 104   Temp 97.9 °F (36.6 °C) (Temporal)   Resp 18   SpO2 95%      Physical Exam:  Physical Exam  Vitals reviewed.   Constitutional:       General: She is not in acute distress.     Appearance: Normal appearance. She is obese. She is not ill-appearing.      Comments: +hard of hearing   HENT:      Head: Normocephalic and atraumatic.      Mouth/Throat:      Mouth: Mucous membranes are moist.      Pharynx: Oropharynx is clear.   Eyes:      Conjunctiva/sclera: Conjunctivae normal.      Pupils: Pupils are equal, round, and reactive to light.   Cardiovascular:      Rate and Rhythm: Normal rate and regular rhythm.      Heart sounds: No murmur heard.  Pulmonary:      Effort: Pulmonary effort is normal. No respiratory distress.      Breath sounds: Wheezing present.   Abdominal:      General: Abdomen is flat. Bowel sounds are normal. There is no distension.      Palpations: Abdomen is soft. There is no mass.      Tenderness: There is no abdominal tenderness. There is no guarding.   Musculoskeletal:         General: No swelling. Normal range of motion.      Cervical back: Normal range of motion.   Lymphadenopathy:      Cervical: No cervical adenopathy.   Skin:     General: Skin is warm and dry.   Neurological:      General: No focal deficit present.      Mental Status: She is alert and oriented to person, place, and time. Mental status is at baseline.   Psychiatric:         Mood and Affect: Mood normal.         PHQ-9 Score  PHQ-9 Total Score:       Pain Score  Vitals:    10/24/23 1154   BP: 142/77   Pulse: 104   Resp: 18   Temp: 97.9 °F (36.6 °C)   TempSrc: Temporal   SpO2: 95%   PainSc: 0-No pain                       PAINSCOREQUALITYMETRIC:   Vitals:    10/24/23 1154   PainSc: 0-No pain              Lab Review  Lab Results   Component  Value Date    WBC 7.85 10/24/2023    HGB 9.9 (L) 10/24/2023    HCT 33.9 (L) 10/24/2023    MCV 85.8 10/24/2023    RDW 14.3 10/24/2023     10/24/2023    NEUTRORELPCT 76.6 (H) 10/24/2023    LYMPHORELPCT 12.2 (L) 10/24/2023    MONORELPCT 6.1 10/24/2023    EOSRELPCT 3.8 10/24/2023    BASORELPCT 0.5 10/24/2023    NEUTROABS 6.01 10/24/2023    LYMPHSABS 0.96 10/24/2023     Lab Results   Component Value Date     10/24/2023    K 4.0 10/24/2023    CO2 28.2 10/24/2023     10/24/2023    BUN 12 10/24/2023    CREATININE 0.80 10/24/2023    EGFRIFNONA 51 (L) 11/22/2021    GLUCOSE 214 (H) 10/24/2023    CALCIUM 9.1 10/24/2023    ALKPHOS 124 (H) 10/24/2023    AST 13 10/24/2023    ALT 9 10/24/2023    BILITOT 0.2 10/24/2023    ALBUMIN 3.7 10/24/2023    PROTEINTOT 6.8 10/24/2023    MG 2.0 10/09/2023    PHOS 3.5 05/18/2020     Lab Results   Component Value Date    RETICCTPCT 4.26 (H) 08/16/2023     Lab Results   Component Value Date    FERRITIN 119.20 10/11/2023    IRON 30 (L) 10/11/2023    TIBC 352 10/11/2023    LABIRON 9 (L) 10/11/2023    [id number] 548 08/14/2023    FOLATE 12.90 08/14/2023        Radiology Results  CT Angiogram Chest Pulmonary Embolism (10/09/2023 16:41)   MPRESSION:  1.  Right upper lobe mass in association with pneumonia has increased in  size and extent when compared to the previous exam with extension to the  pleural surface. Masslike component is approximately 56.8 x 44.5 mm and  was previously 40.1 x 36.7 mm. This would correspond to primary  malignancy.  2.  Central bronchial wall thickening. Can be seen with reactive airway  disease or bronchitis.  3.  1.3 cm left adrenal nodule is noted. No significant change from  previous.  4.  Increased size of paratracheal and mediastinal lymph nodes. A right  paratracheal lymph node is 2.5 cm and was previously 2.2 cm.  5. No PE identified.  6. Marked cardiomegaly, stable.  7. Otherwise stable chest with other nonacute/incidental findings  as  above.    CT Head Without Contrast (10/09/2023 12:00)   IMPRESSION:    Unremarkable exam demonstrating no CT evidence of acute intracranial  findings.      CT Chest Without Contrast Diagnostic (08/09/2023 12:38)       NM PET/CT Skull Base to Mid Thigh (06/20/2023 11:12)         CT Chest Low Dose Cancer Screening WO (02/14/2023 12:58)   IMPRESSION:    Interval development or enlargement of a 1.9 cm spiculated right upper  lobe pulmonary nodule concerning in appearance for malignancy and PET/CT  is recommended for further evaluation.        Pathology:     Tissue Pathology Exam (09/06/2023 08:13)         6/29/23           Assessment / Plan         Assessment and Plan   Myara Hays is a 78 y.o. year old presents for       Non small cell lung cancer   Cancer Staging   Stage IIIA (cT1c, cN2, cM0)  -Oncology history as above. Patient with 1.9 cm spiculated right upper lobe nodule on low dose CT chest screen (2/2023). EBUS by Dr. Peter negative for malignancy (2/2023). PET/CT with hypermetabolism RUL 2.1 cm and pretracheal region lymph node (6/2023). CT guided biopsy negative for malignancy (6/2023). Navigational bronchoscopy with positive RUL and  4R (paratracheal) for squamous cell (9/6/2023). PD-L1 TPS score 60%.   -Patient not deemed for surgical resection. We reviewed her staging and treatment options which include concurrent chemo-radiation followed by adjuvant immunotherapy. Chemotherapy agents to be used would include weekly carboplatin AUC2 and paclitaxel 50 mg/m2. Chemo will be followed by one year of Durvalumab based on PACIFIC trial.   -Patient experienced anaphylaxis 8 minutes into the paclitaxel on first cycle 10/9, carboplatin was not administered. Due to this, paclitaxel will be omitted from weekly chemotherapy.  -Radiation therapy started 10/10; 6000 cGy in 30 treatment fractions   -C1 10/18- tolerated well   -C2 10/24- proceed today      2. Anemia; suspicious for iron deficiency anemia due to GIB  -  Patient with normal H/H in our system 2/2023, with acute drop in Hgb 6/29 (10.7) to Hgb (6.5) on 8/9 requiring transfusion. Patient reports one episode of melena two days ago (she is a poor historian).   -Last colonoscopy and endoscopy are unknown; think it may have been >5 years ago  -CBC from 8/14 show Hgb 8.2, Hct 27, MCV 90. Normal WBC and platelet count. Iron studies with normal iron (37), TIBC (435), and low iron saturation (9). This is in light of recent blood transfusion. Normal folate and B12 level. Reticulocyte count noted to be elevated to 6%  -Patient is with fatigue. Denies shortness of breath (aside from baseline COPD) or chest pain. Continues to be on Eliquis for Afib  -Initial work up from consultation confirmed iron deficiency anemia, normal folate/b12 levels. No indication of hemolysis seen with normal LDH and haptoglobin. Myeloma work up has been negative with no M spike on serum protein electrophoresis and normal k/l free light chain ratio. Peripheral smear with normal total WBC without granulocytic dysplasia or blasts.  -Received Ferumoxytol 8/29/2023    3. Malignancy associated pain  - Currently denies        Discussed possible differential diagnoses, testing, treatment, recommended non-pharmacological interventions, risks, warning signs to monitor for that would indicate need for follow-up in clinic or ER. If no improvement with these regimens or you have new or worsening symptoms follow-up. Patient verbalizes understanding and agreement with plan of care. Denies further needs or concerns.     Patient was given instructions and counseling regarding her condition and for health maintenance advised.       All questions were answered to her satisfaction.              Meds ordered during this visit  No orders of the defined types were placed in this encounter.      Visit Diagnoses    ICD-10-CM ICD-9-CM   1. Non-small cell cancer of right lung  C34.91 162.9   2. Primary cancer of right upper lobe  of lung  C34.11 162.3   3. Iron deficiency anemia, unspecified iron deficiency anemia type  D50.9 280.9   4. Chemotherapy adverse reaction, subsequent encounter  T45.1X5D V58.89           Follow Up   Upcoming weeks prior to treatment with NP  Return to see me prior to last cycle 11/22; repeat iron studies, ferritin  Order CT chest for follow up after last cycle, followed by maintenance durvalumab        This document has been electronically signed by Shawna Bond MD   October 24, 2023 13:24 EDT    Dictated Utilizing Dragon Dictation: Part of this note may be an electronic transcription/translation of spoken language to printed text using the Dragon Dictation System.

## 2023-10-25 ENCOUNTER — HOSPITAL ENCOUNTER (OUTPATIENT)
Dept: RADIATION ONCOLOGY | Facility: HOSPITAL | Age: 78
Discharge: HOME | End: 2023-10-25

## 2023-10-25 DIAGNOSIS — D50.9 IRON DEFICIENCY ANEMIA, UNSPECIFIED IRON DEFICIENCY ANEMIA TYPE: ICD-10-CM

## 2023-10-25 DIAGNOSIS — C34.91 NON-SMALL CELL CANCER OF RIGHT LUNG: Primary | ICD-10-CM

## 2023-10-25 DIAGNOSIS — C34.11 PRIMARY CANCER OF RIGHT UPPER LOBE OF LUNG: ICD-10-CM

## 2023-10-25 DIAGNOSIS — K90.49 MALABSORPTION DUE TO INTOLERANCE, NOT ELSEWHERE CLASSIFIED: ICD-10-CM

## 2023-10-25 LAB
RAD ONC ARIA COURSE ID: NORMAL
RAD ONC ARIA COURSE INTENT: NORMAL
RAD ONC ARIA COURSE LAST TREATMENT DATE: NORMAL
RAD ONC ARIA COURSE START DATE: NORMAL
RAD ONC ARIA COURSE TREATMENT ELAPSED DAYS: 15
RAD ONC ARIA FIRST TREATMENT DATE: NORMAL
RAD ONC ARIA PLAN FRACTIONS TREATED TO DATE: 11
RAD ONC ARIA PLAN ID: NORMAL
RAD ONC ARIA PLAN PRESCRIBED DOSE PER FRACTION: 2 GY
RAD ONC ARIA PLAN PRIMARY REFERENCE POINT: NORMAL
RAD ONC ARIA PLAN TOTAL FRACTIONS PRESCRIBED: 30
RAD ONC ARIA PLAN TOTAL PRESCRIBED DOSE: 6000 CGY
RAD ONC ARIA REFERENCE POINT DOSAGE GIVEN TO DATE: 22 GY
RAD ONC ARIA REFERENCE POINT ID: NORMAL
RAD ONC ARIA REFERENCE POINT SESSION DOSAGE GIVEN: 2 GY

## 2023-10-25 PROCEDURE — 77386: CPT | Performed by: RADIOLOGY

## 2023-10-26 ENCOUNTER — PATIENT OUTREACH (OUTPATIENT)
Dept: ONCOLOGY | Facility: CLINIC | Age: 78
End: 2023-10-26
Payer: MEDICARE

## 2023-10-26 ENCOUNTER — HOSPITAL ENCOUNTER (OUTPATIENT)
Dept: RADIATION ONCOLOGY | Facility: HOSPITAL | Age: 78
Discharge: HOME | End: 2023-10-26

## 2023-10-26 LAB
RAD ONC ARIA COURSE ID: NORMAL
RAD ONC ARIA COURSE INTENT: NORMAL
RAD ONC ARIA COURSE LAST TREATMENT DATE: NORMAL
RAD ONC ARIA COURSE START DATE: NORMAL
RAD ONC ARIA COURSE TREATMENT ELAPSED DAYS: 16
RAD ONC ARIA FIRST TREATMENT DATE: NORMAL
RAD ONC ARIA PLAN FRACTIONS TREATED TO DATE: 12
RAD ONC ARIA PLAN ID: NORMAL
RAD ONC ARIA PLAN PRESCRIBED DOSE PER FRACTION: 2 GY
RAD ONC ARIA PLAN PRIMARY REFERENCE POINT: NORMAL
RAD ONC ARIA PLAN TOTAL FRACTIONS PRESCRIBED: 30
RAD ONC ARIA PLAN TOTAL PRESCRIBED DOSE: 6000 CGY
RAD ONC ARIA REFERENCE POINT DOSAGE GIVEN TO DATE: 24 GY
RAD ONC ARIA REFERENCE POINT ID: NORMAL
RAD ONC ARIA REFERENCE POINT SESSION DOSAGE GIVEN: 2 GY

## 2023-10-26 PROCEDURE — 77336 RADIATION PHYSICS CONSULT: CPT | Performed by: RADIOLOGY

## 2023-10-26 PROCEDURE — 77386: CPT | Performed by: RADIOLOGY

## 2023-10-27 ENCOUNTER — HOSPITAL ENCOUNTER (OUTPATIENT)
Dept: RADIATION ONCOLOGY | Facility: HOSPITAL | Age: 78
Discharge: HOME | End: 2023-10-27

## 2023-10-27 LAB
RAD ONC ARIA COURSE ID: NORMAL
RAD ONC ARIA COURSE INTENT: NORMAL
RAD ONC ARIA COURSE LAST TREATMENT DATE: NORMAL
RAD ONC ARIA COURSE START DATE: NORMAL
RAD ONC ARIA COURSE TREATMENT ELAPSED DAYS: 17
RAD ONC ARIA FIRST TREATMENT DATE: NORMAL
RAD ONC ARIA PLAN FRACTIONS TREATED TO DATE: 13
RAD ONC ARIA PLAN ID: NORMAL
RAD ONC ARIA PLAN PRESCRIBED DOSE PER FRACTION: 2 GY
RAD ONC ARIA PLAN PRIMARY REFERENCE POINT: NORMAL
RAD ONC ARIA PLAN TOTAL FRACTIONS PRESCRIBED: 30
RAD ONC ARIA PLAN TOTAL PRESCRIBED DOSE: 6000 CGY
RAD ONC ARIA REFERENCE POINT DOSAGE GIVEN TO DATE: 26 GY
RAD ONC ARIA REFERENCE POINT ID: NORMAL
RAD ONC ARIA REFERENCE POINT SESSION DOSAGE GIVEN: 2 GY

## 2023-10-27 PROCEDURE — 77386: CPT | Performed by: RADIOLOGY

## 2023-10-30 ENCOUNTER — PATIENT OUTREACH (OUTPATIENT)
Dept: CASE MANAGEMENT | Facility: OTHER | Age: 78
End: 2023-10-30
Payer: MEDICARE

## 2023-10-30 ENCOUNTER — HOSPITAL ENCOUNTER (OUTPATIENT)
Dept: RADIATION ONCOLOGY | Facility: HOSPITAL | Age: 78
Discharge: HOME | End: 2023-10-30
Payer: MEDICARE

## 2023-10-30 LAB
RAD ONC ARIA COURSE ID: NORMAL
RAD ONC ARIA COURSE INTENT: NORMAL
RAD ONC ARIA COURSE LAST TREATMENT DATE: NORMAL
RAD ONC ARIA COURSE START DATE: NORMAL
RAD ONC ARIA COURSE TREATMENT ELAPSED DAYS: 20
RAD ONC ARIA FIRST TREATMENT DATE: NORMAL
RAD ONC ARIA PLAN FRACTIONS TREATED TO DATE: 14
RAD ONC ARIA PLAN ID: NORMAL
RAD ONC ARIA PLAN PRESCRIBED DOSE PER FRACTION: 2 GY
RAD ONC ARIA PLAN PRIMARY REFERENCE POINT: NORMAL
RAD ONC ARIA PLAN TOTAL FRACTIONS PRESCRIBED: 30
RAD ONC ARIA PLAN TOTAL PRESCRIBED DOSE: 6000 CGY
RAD ONC ARIA REFERENCE POINT DOSAGE GIVEN TO DATE: 28 GY
RAD ONC ARIA REFERENCE POINT ID: NORMAL
RAD ONC ARIA REFERENCE POINT SESSION DOSAGE GIVEN: 2 GY

## 2023-10-30 PROCEDURE — 77386: CPT | Performed by: RADIOLOGY

## 2023-10-30 NOTE — OUTREACH NOTE
AMBULATORY CASE MANAGEMENT NOTE    Name and Relationship of Patient/Support Person: Mayra Hays - Self  Self    Patient Outreach    Unable to reach patient X 3.    Sandra BUCKLEY  Ambulatory Case Management    10/30/2023, 11:53 EDT

## 2023-10-31 ENCOUNTER — HOSPITAL ENCOUNTER (OUTPATIENT)
Dept: RADIATION ONCOLOGY | Facility: HOSPITAL | Age: 78
Discharge: HOME | End: 2023-10-31

## 2023-10-31 ENCOUNTER — LAB (OUTPATIENT)
Dept: ONCOLOGY | Facility: HOSPITAL | Age: 78
End: 2023-10-31
Payer: MEDICARE

## 2023-10-31 VITALS
TEMPERATURE: 97.8 F | OXYGEN SATURATION: 96 % | SYSTOLIC BLOOD PRESSURE: 137 MMHG | HEART RATE: 87 BPM | RESPIRATION RATE: 18 BRPM | DIASTOLIC BLOOD PRESSURE: 59 MMHG

## 2023-10-31 DIAGNOSIS — C34.91 NON-SMALL CELL CANCER OF RIGHT LUNG: ICD-10-CM

## 2023-10-31 DIAGNOSIS — C34.11 PRIMARY CANCER OF RIGHT UPPER LOBE OF LUNG: ICD-10-CM

## 2023-10-31 LAB
ALBUMIN SERPL-MCNC: 4 G/DL (ref 3.5–5.2)
ALBUMIN/GLOB SERPL: 1.4 G/DL
ALP SERPL-CCNC: 122 U/L (ref 39–117)
ALT SERPL W P-5'-P-CCNC: 10 U/L (ref 1–33)
ANION GAP SERPL CALCULATED.3IONS-SCNC: 10 MMOL/L (ref 5–15)
AST SERPL-CCNC: 14 U/L (ref 1–32)
BASOPHILS # BLD AUTO: 0.05 10*3/MM3 (ref 0–0.2)
BASOPHILS NFR BLD AUTO: 0.8 % (ref 0–1.5)
BILIRUB SERPL-MCNC: <0.2 MG/DL (ref 0–1.2)
BUN SERPL-MCNC: 12 MG/DL (ref 8–23)
BUN/CREAT SERPL: 10.6 (ref 7–25)
CALCIUM SPEC-SCNC: 9.1 MG/DL (ref 8.6–10.5)
CHLORIDE SERPL-SCNC: 100 MMOL/L (ref 98–107)
CO2 SERPL-SCNC: 28 MMOL/L (ref 22–29)
CREAT SERPL-MCNC: 1.13 MG/DL (ref 0.57–1)
DEPRECATED RDW RBC AUTO: 49.4 FL (ref 37–54)
EGFRCR SERPLBLD CKD-EPI 2021: 50.2 ML/MIN/1.73
EOSINOPHIL # BLD AUTO: 0.25 10*3/MM3 (ref 0–0.4)
EOSINOPHIL NFR BLD AUTO: 3.9 % (ref 0.3–6.2)
ERYTHROCYTE [DISTWIDTH] IN BLOOD BY AUTOMATED COUNT: 16.1 % (ref 12.3–15.4)
GLOBULIN UR ELPH-MCNC: 2.9 GM/DL
GLUCOSE SERPL-MCNC: 230 MG/DL (ref 65–99)
HCT VFR BLD AUTO: 35.4 % (ref 34–46.6)
HGB BLD-MCNC: 10.6 G/DL (ref 12–15.9)
IMM GRANULOCYTES # BLD AUTO: 0.04 10*3/MM3 (ref 0–0.05)
IMM GRANULOCYTES NFR BLD AUTO: 0.6 % (ref 0–0.5)
LYMPHOCYTES # BLD AUTO: 0.83 10*3/MM3 (ref 0.7–3.1)
LYMPHOCYTES NFR BLD AUTO: 12.8 % (ref 19.6–45.3)
MCH RBC QN AUTO: 26.2 PG (ref 26.6–33)
MCHC RBC AUTO-ENTMCNC: 29.9 G/DL (ref 31.5–35.7)
MCV RBC AUTO: 87.4 FL (ref 79–97)
MONOCYTES # BLD AUTO: 0.52 10*3/MM3 (ref 0.1–0.9)
MONOCYTES NFR BLD AUTO: 8 % (ref 5–12)
NEUTROPHILS NFR BLD AUTO: 4.78 10*3/MM3 (ref 1.7–7)
NEUTROPHILS NFR BLD AUTO: 73.9 % (ref 42.7–76)
NRBC BLD AUTO-RTO: 0 /100 WBC (ref 0–0.2)
PLATELET # BLD AUTO: 233 10*3/MM3 (ref 140–450)
PMV BLD AUTO: 10.9 FL (ref 6–12)
POTASSIUM SERPL-SCNC: 4.4 MMOL/L (ref 3.5–5.2)
PROT SERPL-MCNC: 6.9 G/DL (ref 6–8.5)
RAD ONC ARIA COURSE ID: NORMAL
RAD ONC ARIA COURSE INTENT: NORMAL
RAD ONC ARIA COURSE LAST TREATMENT DATE: NORMAL
RAD ONC ARIA COURSE START DATE: NORMAL
RAD ONC ARIA COURSE TREATMENT ELAPSED DAYS: 21
RAD ONC ARIA FIRST TREATMENT DATE: NORMAL
RAD ONC ARIA PLAN FRACTIONS TREATED TO DATE: 15
RAD ONC ARIA PLAN ID: NORMAL
RAD ONC ARIA PLAN PRESCRIBED DOSE PER FRACTION: 2 GY
RAD ONC ARIA PLAN PRIMARY REFERENCE POINT: NORMAL
RAD ONC ARIA PLAN TOTAL FRACTIONS PRESCRIBED: 30
RAD ONC ARIA PLAN TOTAL PRESCRIBED DOSE: 6000 CGY
RAD ONC ARIA REFERENCE POINT DOSAGE GIVEN TO DATE: 30 GY
RAD ONC ARIA REFERENCE POINT ID: NORMAL
RAD ONC ARIA REFERENCE POINT SESSION DOSAGE GIVEN: 2 GY
RBC # BLD AUTO: 4.05 10*6/MM3 (ref 3.77–5.28)
SODIUM SERPL-SCNC: 138 MMOL/L (ref 136–145)
WBC NRBC COR # BLD: 6.47 10*3/MM3 (ref 3.4–10.8)

## 2023-10-31 PROCEDURE — 77386: CPT | Performed by: RADIOLOGY

## 2023-10-31 PROCEDURE — 80053 COMPREHEN METABOLIC PANEL: CPT | Performed by: INTERNAL MEDICINE

## 2023-10-31 PROCEDURE — 85025 COMPLETE CBC W/AUTO DIFF WBC: CPT | Performed by: INTERNAL MEDICINE

## 2023-10-31 NOTE — PROGRESS NOTES
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    Staging: Primary cancer of right upper lobe of lung  - Stage IIIA (cT1c, cN2, cM0)     This patient was seen today for an on treatment visit.  Patient is receiving chest VMAT radiotherapy with concurrent carboplatin and Taxol chemotherapy.  To date, the patient has received 3000 cGy of a 6000 cGy regimen.    Radiation Treatments       Active   Plans   RUL   Most recent treatment: Dose planned: 200 cGy (fraction 15 on 10/31/2023)   Total: Dose planned: 6,000 cGy (30 fractions)   Elapsed Days: 21      Reference Points   PTV   Most recent treatment: Dose given: 200 cGy (on 10/31/2023)   Total: Dose given: 3,000 cGy   Elapsed Days: 21           Historical   No historical radiation treatments to show.              Subjective   Treatment tolerance:  Mrs. Hays appears to be tolerating radiotherapy well.  She has no side effects to report from treatment.  Notes some lessening of shortness of breath.  Mrs. Hays denies dysphagia, odynophagia, nausea and diminished appetite.    Medications:     Current Outpatient Medications:     acetaminophen (TYLENOL) 325 MG tablet, Take 2 tablets by mouth Every 4 (Four) Hours As Needed for Mild Pain., Disp: 30 tablet, Rfl: 0    albuterol sulfate  (90 Base) MCG/ACT inhaler, Inhale 2 puffs Every 4 (Four) Hours As Needed for Wheezing or Shortness of Air., Disp: 18 g, Rfl: 8    apixaban (ELIQUIS) 5 MG tablet tablet, Take 1 tablet by mouth Every 12 (Twelve) Hours., Disp: 180 tablet, Rfl: 3    atorvastatin (LIPITOR) 20 MG tablet, Take 1 tablet by mouth Daily., Disp: 90 tablet, Rfl: 3    Budeson-Glycopyrrol-Formoterol (Breztri Aerosphere) 160-9-4.8 MCG/ACT aerosol inhaler, Inhale 2 puffs 2 (Two) Times a Day., Disp: 10.7 g, Rfl: 8     esomeprazole (nexIUM) 20 MG capsule, Take 1 capsule by mouth Daily., Disp: , Rfl:     ibuprofen (ADVIL,MOTRIN) 600 MG tablet, Take 1 tablet by mouth Every 6 (Six) Hours As Needed for Mild Pain., Disp: 30 tablet, Rfl: 0    ipratropium-albuterol (DUO-NEB) 0.5-2.5 mg/3 ml nebulizer, USE 1 VIAL IN NEBULIZER 4 TIMES DAILY - and as needed, Disp: 360 mL, Rfl: 11    ketorolac (TORADOL) 10 MG tablet, Take 1 tablet by mouth Every 6 (Six) Hours As Needed for Moderate Pain., Disp: 20 tablet, Rfl: 0    lidocaine-prilocaine (EMLA) 2.5-2.5 % cream, Apply to port-a-cath site 30 minutes prior to arrival at infusion center. Cover with plastic wrap., Disp: 30 g, Rfl: 1    lisinopril (PRINIVIL,ZESTRIL) 5 MG tablet, Take 1 tablet by mouth Daily., Disp: 90 tablet, Rfl: 3    loratadine (CLARITIN) 10 MG tablet, Take 1 tablet by mouth Daily As Needed for Allergies., Disp: , Rfl:     methocarbamol (Robaxin) 500 MG tablet, Take 1 tablet by mouth 3 (Three) Times a Day As Needed for Muscle Spasms., Disp: 30 tablet, Rfl: 2    O2 (OXYGEN), Inhale 2 L/min As Needed., Disp: , Rfl:     ondansetron (Zofran) 8 MG tablet, Take 1 tablet by mouth Every 8 (Eight) Hours As Needed for Nausea or Vomiting., Disp: 30 tablet, Rfl: 1    pantoprazole (PROTONIX) 40 MG EC tablet, Take 1 tablet by mouth Daily As Needed (acid reflux)., Disp: , Rfl:     potassium chloride (K-DUR,KLOR-CON) 20 MEQ CR tablet, Take 1 tablet by mouth Daily., Disp: , Rfl:     prochlorperazine (COMPAZINE) 10 MG tablet, Take 1 tablet by mouth Every 6 (Six) Hours As Needed for Nausea or Vomiting., Disp: 30 tablet, Rfl: 1    Stool Softener 100 MG capsule, Take 1 capsule by mouth 2 (two) times a day., Disp: , Rfl:     topiramate (TOPAMAX) 25 MG tablet, Take 1 tablet by mouth 2 (Two) Times a Day., Disp: , Rfl:     trimethoprim (TRIMPEX) 100 MG tablet, Take 1 tablet by mouth 2 (Two) Times a Day., Disp: 30 tablet, Rfl: 1    Allergies:   Allergies   Allergen Reactions    Paclitaxel Anaphylaxis      Objective     Physical Exam:  The patient is an elderly somewhat frail-appearing white female in no acute distress.  Vital signs as below.  KPS 90.    Vital Signs:   Vitals:    10/31/23 1606   BP: 137/59   Pulse: 87   Resp: 18   Temp: 97.8 °F (36.6 °C)   TempSrc: Temporal   SpO2: 96%   PainSc: 0-No pain     There is no height or weight on file to calculate BMI.     Neck: Short, supple no cervical or periclavicular adenopathy  Heart: Regular rate and rhythm  Lungs: Clear bilaterally to auscultation  Abdomen: Soft, nontender no organomegaly  Integumentary: No radiation changes noted over the anterior posterior chest walls.    Current Total XRT Dose (cGY):    Radiation Treatments       Active   Plans   RUL   Most recent treatment: Dose planned: 200 cGy (fraction 15 on 10/31/2023)   Total: Dose planned: 6,000 cGy (30 fractions)   Elapsed Days: 21      Reference Points   PTV   Most recent treatment: Dose given: 200 cGy (on 10/31/2023)   Total: Dose given: 3,000 cGy   Elapsed Days: 21           Historical   No historical radiation treatments to show.              Plan      Plan:   Patient was seen today for an on treatment visit. Patient is receiving radiation therapy to the right upper lung mass and regional lymph nodes. Patient is stable and tolerating radiation therapy well with minimal side effects. Continue with radiation therapy.     I have reviewed treatment setup notes, checked and approved the daily guidance images. I reviewed dose delivery, treatment parameters and deemed them appropriate. We plan to continue radiation therapy as prescribed.     Digital speech recognition software was used to dictate parts of this note, some dictation errors may occur.    Nima Smith MD   10/31/23 16:13 EDT

## 2023-11-01 ENCOUNTER — HOSPITAL ENCOUNTER (OUTPATIENT)
Dept: RADIATION ONCOLOGY | Facility: HOSPITAL | Age: 78
Discharge: HOME | End: 2023-11-01

## 2023-11-01 ENCOUNTER — APPOINTMENT (OUTPATIENT)
Dept: ONCOLOGY | Facility: HOSPITAL | Age: 78
End: 2023-11-01
Payer: MEDICARE

## 2023-11-01 ENCOUNTER — OFFICE VISIT (OUTPATIENT)
Dept: ONCOLOGY | Facility: CLINIC | Age: 78
End: 2023-11-01
Payer: MEDICARE

## 2023-11-01 ENCOUNTER — HOSPITAL ENCOUNTER (OUTPATIENT)
Dept: RADIATION ONCOLOGY | Facility: HOSPITAL | Age: 78
Setting detail: RADIATION/ONCOLOGY SERIES
End: 2023-11-01
Payer: MEDICARE

## 2023-11-01 ENCOUNTER — INFUSION (OUTPATIENT)
Dept: ONCOLOGY | Facility: HOSPITAL | Age: 78
End: 2023-11-01
Payer: MEDICARE

## 2023-11-01 VITALS
SYSTOLIC BLOOD PRESSURE: 136 MMHG | DIASTOLIC BLOOD PRESSURE: 77 MMHG | HEART RATE: 95 BPM | RESPIRATION RATE: 18 BRPM | OXYGEN SATURATION: 97 % | TEMPERATURE: 97.8 F

## 2023-11-01 VITALS
SYSTOLIC BLOOD PRESSURE: 136 MMHG | RESPIRATION RATE: 18 BRPM | DIASTOLIC BLOOD PRESSURE: 77 MMHG | TEMPERATURE: 97.8 F | HEART RATE: 95 BPM | OXYGEN SATURATION: 97 %

## 2023-11-01 DIAGNOSIS — C34.11 PRIMARY CANCER OF RIGHT UPPER LOBE OF LUNG: ICD-10-CM

## 2023-11-01 DIAGNOSIS — C34.91 NON-SMALL CELL CANCER OF RIGHT LUNG: Primary | ICD-10-CM

## 2023-11-01 DIAGNOSIS — D50.9 IRON DEFICIENCY ANEMIA, UNSPECIFIED IRON DEFICIENCY ANEMIA TYPE: ICD-10-CM

## 2023-11-01 DIAGNOSIS — Z95.828 PORT-A-CATH IN PLACE: ICD-10-CM

## 2023-11-01 DIAGNOSIS — C34.11 PRIMARY CANCER OF RIGHT UPPER LOBE OF LUNG: Primary | ICD-10-CM

## 2023-11-01 DIAGNOSIS — C34.91 NON-SMALL CELL CANCER OF RIGHT LUNG: ICD-10-CM

## 2023-11-01 LAB
RAD ONC ARIA COURSE ID: NORMAL
RAD ONC ARIA COURSE INTENT: NORMAL
RAD ONC ARIA COURSE LAST TREATMENT DATE: NORMAL
RAD ONC ARIA COURSE START DATE: NORMAL
RAD ONC ARIA COURSE TREATMENT ELAPSED DAYS: 22
RAD ONC ARIA FIRST TREATMENT DATE: NORMAL
RAD ONC ARIA PLAN FRACTIONS TREATED TO DATE: 16
RAD ONC ARIA PLAN ID: NORMAL
RAD ONC ARIA PLAN PRESCRIBED DOSE PER FRACTION: 2 GY
RAD ONC ARIA PLAN PRIMARY REFERENCE POINT: NORMAL
RAD ONC ARIA PLAN TOTAL FRACTIONS PRESCRIBED: 30
RAD ONC ARIA PLAN TOTAL PRESCRIBED DOSE: 6000 CGY
RAD ONC ARIA REFERENCE POINT DOSAGE GIVEN TO DATE: 32 GY
RAD ONC ARIA REFERENCE POINT ID: NORMAL
RAD ONC ARIA REFERENCE POINT SESSION DOSAGE GIVEN: 2 GY

## 2023-11-01 PROCEDURE — 96375 TX/PRO/DX INJ NEW DRUG ADDON: CPT

## 2023-11-01 PROCEDURE — 25010000002 PALONOSETRON 0.25 MG/5ML SOLUTION PREFILLED SYRINGE: Performed by: INTERNAL MEDICINE

## 2023-11-01 PROCEDURE — 96413 CHEMO IV INFUSION 1 HR: CPT

## 2023-11-01 PROCEDURE — 25010000002 CARBOPLATIN PER 50 MG: Performed by: INTERNAL MEDICINE

## 2023-11-01 PROCEDURE — 25010000002 HEPARIN LOCK FLUSH PER 10 UNITS: Performed by: NURSE PRACTITIONER

## 2023-11-01 PROCEDURE — 25810000003 SODIUM CHLORIDE 0.9 % SOLUTION: Performed by: INTERNAL MEDICINE

## 2023-11-01 PROCEDURE — 25010000002 DEXAMETHASONE SODIUM PHOSPHATE 20 MG/5ML SOLUTION: Performed by: INTERNAL MEDICINE

## 2023-11-01 PROCEDURE — 77014 CHG CT GUIDANCE RADIATION THERAPY FLDS PLACEMENT: CPT | Performed by: RADIOLOGY

## 2023-11-01 PROCEDURE — 77386: CPT | Performed by: RADIOLOGY

## 2023-11-01 RX ORDER — HEPARIN SODIUM (PORCINE) LOCK FLUSH IV SOLN 100 UNIT/ML 100 UNIT/ML
500 SOLUTION INTRAVENOUS AS NEEDED
OUTPATIENT
Start: 2023-11-01

## 2023-11-01 RX ORDER — SODIUM CHLORIDE 0.9 % (FLUSH) 0.9 %
10 SYRINGE (ML) INJECTION AS NEEDED
Status: DISCONTINUED | OUTPATIENT
Start: 2023-11-01 | End: 2023-11-01 | Stop reason: HOSPADM

## 2023-11-01 RX ORDER — SODIUM CHLORIDE 0.9 % (FLUSH) 0.9 %
10 SYRINGE (ML) INJECTION AS NEEDED
OUTPATIENT
Start: 2023-11-01

## 2023-11-01 RX ORDER — PALONOSETRON 0.05 MG/ML
0.25 INJECTION, SOLUTION INTRAVENOUS ONCE
Status: COMPLETED | OUTPATIENT
Start: 2023-11-01 | End: 2023-11-01

## 2023-11-01 RX ORDER — SODIUM CHLORIDE 0.9 % (FLUSH) 0.9 %
20 SYRINGE (ML) INJECTION AS NEEDED
OUTPATIENT
Start: 2023-11-01

## 2023-11-01 RX ORDER — HEPARIN SODIUM (PORCINE) LOCK FLUSH IV SOLN 100 UNIT/ML 100 UNIT/ML
300 SOLUTION INTRAVENOUS ONCE
OUTPATIENT
Start: 2023-11-01

## 2023-11-01 RX ORDER — SODIUM CHLORIDE 9 MG/ML
250 INJECTION, SOLUTION INTRAVENOUS ONCE
Status: COMPLETED | OUTPATIENT
Start: 2023-11-01 | End: 2023-11-01

## 2023-11-01 RX ORDER — HEPARIN SODIUM (PORCINE) LOCK FLUSH IV SOLN 100 UNIT/ML 100 UNIT/ML
500 SOLUTION INTRAVENOUS AS NEEDED
Status: DISCONTINUED | OUTPATIENT
Start: 2023-11-01 | End: 2023-11-01 | Stop reason: HOSPADM

## 2023-11-01 RX ADMIN — CARBOPLATIN 160 MG: 10 INJECTION, SOLUTION INTRAVENOUS at 13:34

## 2023-11-01 RX ADMIN — DEXAMETHASONE SODIUM PHOSPHATE 12 MG: 4 INJECTION, SOLUTION INTRA-ARTICULAR; INTRALESIONAL; INTRAMUSCULAR; INTRAVENOUS; SOFT TISSUE at 13:08

## 2023-11-01 RX ADMIN — Medication 500 UNITS: at 14:09

## 2023-11-01 RX ADMIN — Medication 10 ML: at 14:09

## 2023-11-01 RX ADMIN — PALONOSETRON 0.25 MG: 0.25 INJECTION, SOLUTION INTRAVENOUS at 13:07

## 2023-11-01 RX ADMIN — SODIUM CHLORIDE 250 ML: 9 INJECTION, SOLUTION INTRAVENOUS at 13:07

## 2023-11-01 NOTE — PROGRESS NOTES
Subjective     Date: 2023    Referring Provider  No ref. provider found    Chief Complaint  Symptomatic anemia   2. Non small Cell Lung Cancer  Cancer Staging   Stage IIIA (cT1c, cN2, cM0)        Subjective      Mayra Hays is a 78 y.o. female who presents today to [facility] for follow up.    HPI:   78 y.o. female with past medical history of diabetes mellitus type 2, hyperlipidemia, COPD, hypertension, atrial fibrillation on anticoagulation (Eliquis), h/o CVA and previous tobacco use presents for symptomatic anemia and NSCLC.    She is present today with her daughter, Hailey, who is assisting with history. Pt started work up for RUL lung nodule , since then noticed to have a decrease in her Hgb to 10.7 from normal in 2023. More recently, her Hgb 8/9 was 6.5, she was directed to ED where she received 1U PRBC.     Patient reports increased fatigue over the last several months. She reports no bleeding, however does report dark colored stool (melena) two days ago. Denies any other evidence of melena or hematochezia.     She has previously been diagnosed with iron deficiency (years ago) requiring oral iron supplements, but never received IV iron or bone marrow biopsy.    She and her daughter are unsure of her last colonoscopy and endoscopy history, think it may have occurred >5 years ago but unsure.    She denies recent weight loss, fever, chills,  night sweats.  Previous smoker, quit 5-6 years ago, smoked 3 packs/day X 30 years. Denies alcohol or drug use. Family history significant for son with leukemia, brothers with lung cancer.     Oncology History:  2023: low dose CT chest screenin.9 cm spiculated right upper lobe pulmonary nodule concerning foe malignancy, PET/CT recommended.   2023: EBUS by Dr. Peter negative for malignancy for bronchial washing and FNA of station 10R  2023: PET/CT: Right upper lobe pulmonary nodule, more  posterior possible satellite nodules are postobstructive process measuring 2.1 cm with mSUV 14.5. Also with pretracheal region lymph node 2.6 cm.   6/29/2023: CT guided needle biopsy was non diagnostic, showed fibrosis and chronic inflammation.   8/9/2023: Lab work showed anemia with Hgb 6.5. Referred to ED. Bronchoscopy deferred.   9/6/2023: Navigational bronchoscopy performed by Dr. Clemens- Right upper lobe lung transbronchial biopsy revealed squamous cell carcinoma, lymph node at station 4R also involved with squamous cell carcinoma. PD-L1 TPS 60%. Patient not deemed a surgical candidate.    Ms. Hays presents today with her daughter, grand daughter, and great grand daughters. She is in a wheelchair. She lives alone with her grand son, able to perform her ADLs including cooking, cleaning.     Treatment history:        Interval History 10/11/2023   Ms. Hays presents for follow up today, accompanied by her daughter in law. She started treatment with paclitaxel and carboplatin on 10/9, unfortunately had a anaphylactic reaction to taxol after 8 minutes of infusion causing her to go to the ED. Patient was awake and feeling back to herself at the time of ED visit. Carboplatin was not administered.     She reports doing well overall, no new symptoms. Started radiation therapy yesterday.      Interval History 10/24/2023   Ms. Hays presents for cycle 2 of carboplatin with concurrent radiation. Did well with first cycle. Denies any adverse effects including nausea, vomiting, diarrhea, neuropathy. Appetite is good. Radiation is going well. No complaints today.  Denies any skin rash or bites.    Interval History 11/01/2023  Ms. Hays presents today for cycle 3 of Carboplatin with concurrent XRT. She states that she continues to tolerate treatment well without any adverse effects. Denies nausea, vomiting, diarrhea, neuropathy. Endorses a good appetite. No other specific complaints.        The following portions of the  patient's history were reviewed and updated as appropriate: allergies, current medications, past family history, past medical history, past social history, past surgical history and problem list.    Objective     Objective     Allergy:   Allergies   Allergen Reactions    Paclitaxel Anaphylaxis        Current Medications:   Current Outpatient Medications   Medication Sig Dispense Refill    acetaminophen (TYLENOL) 325 MG tablet Take 2 tablets by mouth Every 4 (Four) Hours As Needed for Mild Pain. 30 tablet 0    albuterol sulfate  (90 Base) MCG/ACT inhaler Inhale 2 puffs Every 4 (Four) Hours As Needed for Wheezing or Shortness of Air. 18 g 8    apixaban (ELIQUIS) 5 MG tablet tablet Take 1 tablet by mouth Every 12 (Twelve) Hours. 180 tablet 3    atorvastatin (LIPITOR) 20 MG tablet Take 1 tablet by mouth Daily. 90 tablet 3    Budeson-Glycopyrrol-Formoterol (Breztri Aerosphere) 160-9-4.8 MCG/ACT aerosol inhaler Inhale 2 puffs 2 (Two) Times a Day. 10.7 g 8    esomeprazole (nexIUM) 20 MG capsule Take 1 capsule by mouth Daily.      ibuprofen (ADVIL,MOTRIN) 600 MG tablet Take 1 tablet by mouth Every 6 (Six) Hours As Needed for Mild Pain. 30 tablet 0    ipratropium-albuterol (DUO-NEB) 0.5-2.5 mg/3 ml nebulizer USE 1 VIAL IN NEBULIZER 4 TIMES DAILY - and as needed 360 mL 11    ketorolac (TORADOL) 10 MG tablet Take 1 tablet by mouth Every 6 (Six) Hours As Needed for Moderate Pain. 20 tablet 0    lidocaine-prilocaine (EMLA) 2.5-2.5 % cream Apply to port-a-cath site 30 minutes prior to arrival at infusion center. Cover with plastic wrap. 30 g 1    lisinopril (PRINIVIL,ZESTRIL) 5 MG tablet Take 1 tablet by mouth Daily. 90 tablet 3    loratadine (CLARITIN) 10 MG tablet Take 1 tablet by mouth Daily As Needed for Allergies.      methocarbamol (Robaxin) 500 MG tablet Take 1 tablet by mouth 3 (Three) Times a Day As Needed for Muscle Spasms. 30 tablet 2    O2 (OXYGEN) Inhale 2 L/min As Needed.      ondansetron (Zofran) 8 MG tablet  Take 1 tablet by mouth Every 8 (Eight) Hours As Needed for Nausea or Vomiting. 30 tablet 1    pantoprazole (PROTONIX) 40 MG EC tablet Take 1 tablet by mouth Daily As Needed (acid reflux).      potassium chloride (K-DUR,KLOR-CON) 20 MEQ CR tablet Take 1 tablet by mouth Daily.      prochlorperazine (COMPAZINE) 10 MG tablet Take 1 tablet by mouth Every 6 (Six) Hours As Needed for Nausea or Vomiting. 30 tablet 1    Stool Softener 100 MG capsule Take 1 capsule by mouth 2 (two) times a day.      topiramate (TOPAMAX) 25 MG tablet Take 1 tablet by mouth 2 (Two) Times a Day.      trimethoprim (TRIMPEX) 100 MG tablet Take 1 tablet by mouth 2 (Two) Times a Day. 30 tablet 1     No current facility-administered medications for this visit.     Facility-Administered Medications Ordered in Other Visits   Medication Dose Route Frequency Provider Last Rate Last Admin    CARBOplatin (PARAPLATIN) 160 mg in sodium chloride 0.9 % 116 mL chemo IVPB  160 mg Intravenous Once Shawna Bond MD        dexAMETHasone (DECADRON) IVPB 12 mg  12 mg Intravenous Once Shawna Bond  mL/hr at 11/01/23 1308 12 mg at 11/01/23 1308       Past Medical History:  Past Medical History:   Diagnosis Date    Abnormal ECG     Arthritis     CHF (congestive heart failure)     Chronic kidney disease     Collapsed lung     Congenital heart disease     pt unsure of this    COPD (chronic obstructive pulmonary disease)     Coronary artery disease     Diabetes mellitus     TYPE 2    Elevated cholesterol     GERD (gastroesophageal reflux disease)     Heart valve disease     Hyperlipidemia     Hypertension     Myocardial infarction 2019    Primary cancer of right upper lobe of lung 9/12/2023    Sleep apnea     non compliant with CPAP    Stroke 2019    UTI (urinary tract infection)     Wears eyeglasses        Past Surgical History:  Past Surgical History:   Procedure Laterality Date    BRONCHOSCOPY Bilateral 02/27/2023    Procedure: BRONCHOSCOPY WITH  ENDOBRONCHIAL ULTRASOUND;  Surgeon: Abdulkadir Peter MD;  Location:  [facility];  Service: Pulmonary;  Laterality: Bilateral;    BRONCHOSCOPY WITH ION ROBOTIC ASSIST N/A 2023    Procedure: BRONCHOSCOPY WITH ION ROBOT AND EBUS;  Surgeon: John Clemens MD;  Location:  [facility];  Service: Robotics - Pulmonary;  Laterality: N/A;  Ion cath #6 - 0032,  #6 - 0030, cath guide # 0077. EBUS scope removed with balloon intact.    CARDIAC CATHETERIZATION N/A 2017    Procedure: Left Heart Cath;  Surgeon: Jatinder Matias MD;  Location:  [facility];  Service:     CARDIAC CATHETERIZATION N/A 2021    Procedure: Left Heart Cath;  Surgeon: Tolu Steinberg MD;  Location:  [facility];  Service: Cardiology;  Laterality: N/A;    COLONOSCOPY      ENDOSCOPY      GALLBLADDER SURGERY      PORTACATH PLACEMENT N/A 2023    Procedure: INSERTION OF PORTACATH;  Surgeon: Petr Velásquez MD;  Location:  [facility];  Service: General;  Laterality: N/A;    VENTRAL HERNIA REPAIR N/A 2020    Procedure: VENTRAL HERNIA REPAIR LAPAROSCOPIC WITH DAVINCI ROBOT;  Surgeon: Petr Velásquez MD;  Location:  [facility];  Service: DaVinci;  Laterality: N/A;       Social History:  Social History     Socioeconomic History    Marital status: Single    Number of children: 6   Tobacco Use    Smoking status: Former     Packs/day: 3     Types: Cigarettes     Start date: 4/3/1998     Quit date: 2015     Years since quittin.8     Passive exposure: Past    Smokeless tobacco: Never   Vaping Use    Vaping Use: Never used   Substance and Sexual Activity    Alcohol use: No    Drug use: No    Sexual activity: Never         Family History:  Family History   Problem Relation Age of Onset    Heart disease Mother         Rhianna sophia    Heart disease Father     Heart attack Father     Heart failure Father        Review of Systems:  Review of Systems   Constitutional:  Positive for fatigue.    Respiratory:  Positive for cough and shortness of breath.    All other systems reviewed and are negative.      Vital Signs:   /77   Pulse 95   Temp 97.8 °F (36.6 °C) (Temporal)   Resp 18   SpO2 97%      Physical Exam:  Physical Exam  Vitals reviewed.   Constitutional:       General: She is not in acute distress.     Appearance: Normal appearance. She is obese. She is not ill-appearing.      Comments: +hard of hearing   HENT:      Head: Normocephalic and atraumatic.      Mouth/Throat:      Mouth: Mucous membranes are moist.      Pharynx: Oropharynx is clear.   Eyes:      Conjunctiva/sclera: Conjunctivae normal.      Pupils: Pupils are equal, round, and reactive to light.   Cardiovascular:      Rate and Rhythm: Normal rate and regular rhythm.      Heart sounds: No murmur heard.  Pulmonary:      Effort: Pulmonary effort is normal. No respiratory distress.      Breath sounds: Wheezing present.   Abdominal:      General: Abdomen is flat. Bowel sounds are normal. There is no distension.      Palpations: Abdomen is soft. There is no mass.      Tenderness: There is no abdominal tenderness. There is no guarding.   Musculoskeletal:         General: No swelling. Normal range of motion.      Cervical back: Normal range of motion.   Lymphadenopathy:      Cervical: No cervical adenopathy.   Skin:     General: Skin is warm and dry.   Neurological:      General: No focal deficit present.      Mental Status: She is alert and oriented to person, place, and time. Mental status is at baseline.   Psychiatric:         Mood and Affect: Mood normal.         PHQ-9 Score  PHQ-9 Total Score:       Pain Score  Vitals:    11/01/23 1301   BP: 136/77   Pulse: 95   Resp: 18   Temp: 97.8 °F (36.6 °C)   TempSrc: Temporal   SpO2: 97%   PainSc: 0-No pain                       PAINSCOREQUALITYMETRIC:   Vitals:    11/01/23 1301   PainSc: 0-No pain              Lab Review  Lab Results   Component Value Date    WBC 6.47 10/31/2023    HGB 10.6  (L) 10/31/2023    HCT 35.4 10/31/2023    MCV 87.4 10/31/2023    RDW 16.1 (H) 10/31/2023     10/31/2023    NEUTRORELPCT 73.9 10/31/2023    LYMPHORELPCT 12.8 (L) 10/31/2023    MONORELPCT 8.0 10/31/2023    EOSRELPCT 3.9 10/31/2023    BASORELPCT 0.8 10/31/2023    NEUTROABS 4.78 10/31/2023    LYMPHSABS 0.83 10/31/2023     Lab Results   Component Value Date     10/31/2023    K 4.4 10/31/2023    CO2 28.0 10/31/2023     10/31/2023    BUN 12 10/31/2023    CREATININE 1.13 (H) 10/31/2023    EGFRIFNONA 51 (L) 11/22/2021    GLUCOSE 230 (H) 10/31/2023    CALCIUM 9.1 10/31/2023    ALKPHOS 122 (H) 10/31/2023    AST 14 10/31/2023    ALT 10 10/31/2023    BILITOT <0.2 10/31/2023    ALBUMIN 4.0 10/31/2023    PROTEINTOT 6.9 10/31/2023    MG 2.0 10/09/2023    PHOS 3.5 05/18/2020     Lab Results   Component Value Date    RETICCTPCT 4.26 (H) 08/16/2023     Lab Results   Component Value Date    FERRITIN 119.20 10/11/2023    IRON 30 (L) 10/11/2023    TIBC 352 10/11/2023    LABIRON 9 (L) 10/11/2023    VOWGLSAI13 548 08/14/2023    FOLATE 12.90 08/14/2023        Radiology Results  CT Angiogram Chest Pulmonary Embolism (10/09/2023 16:41)   MPRESSION:  1.  Right upper lobe mass in association with pneumonia has increased in  size and extent when compared to the previous exam with extension to the  pleural surface. Masslike component is approximately 56.8 x 44.5 mm and  was previously 40.1 x 36.7 mm. This would correspond to primary  malignancy.  2.  Central bronchial wall thickening. Can be seen with reactive airway  disease or bronchitis.  3.  1.3 cm left adrenal nodule is noted. No significant change from  previous.  4.  Increased size of paratracheal and mediastinal lymph nodes. A right  paratracheal lymph node is 2.5 cm and was previously 2.2 cm.  5. No PE identified.  6. Marked cardiomegaly, stable.  7. Otherwise stable chest with other nonacute/incidental findings as  above.    CT Head Without Contrast (10/09/2023 12:00)    IMPRESSION:    Unremarkable exam demonstrating no CT evidence of acute intracranial  findings.      CT Chest Without Contrast Diagnostic (08/09/2023 12:38)       NM PET/CT Skull Base to Mid Thigh (06/20/2023 11:12)         CT Chest Low Dose Cancer Screening WO (02/14/2023 12:58)   IMPRESSION:    Interval development or enlargement of a 1.9 cm spiculated right upper  lobe pulmonary nodule concerning in appearance for malignancy and PET/CT  is recommended for further evaluation.        Pathology:     Tissue Pathology Exam (09/06/2023 08:13)         6/29/23           Assessment / Plan         Assessment and Plan   Mayra Hays is a 78 y.o. year old presents for       Non small cell lung cancer   Cancer Staging   Stage IIIA (cT1c, cN2, cM0)  -Oncology history as above. Patient with 1.9 cm spiculated right upper lobe nodule on low dose CT chest screen (2/2023). EBUS by Dr. Peter negative for malignancy (2/2023). PET/CT with hypermetabolism RUL 2.1 cm and pretracheal region lymph node (6/2023). CT guided biopsy negative for malignancy (6/2023). Navigational bronchoscopy with positive RUL and  4R (paratracheal) for squamous cell (9/6/2023). PD-L1 TPS score 60%.   -Patient not deemed for surgical resection. We reviewed her staging and treatment options which include concurrent chemo-radiation followed by adjuvant immunotherapy. Chemotherapy agents to be used would include weekly carboplatin AUC2 and paclitaxel 50 mg/m2. Chemo will be followed by one year of Durvalumab based on PACIFIC trial.   -Patient experienced anaphylaxis 8 minutes into the paclitaxel on first cycle 10/9, carboplatin was not administered. Due to this, paclitaxel will be omitted from weekly chemotherapy.  -Radiation therapy started 10/10; 6000 cGy in 30 treatment fractions   -C1 10/18- tolerated well   -C2 10/24- tolerated well  -C3 11/01/23 - proceed today      2. Anemia; suspicious for iron deficiency anemia due to GIB  - Patient with normal H/H in  our system 2/2023, with acute drop in Hgb 6/29 (10.7) to Hgb (6.5) on 8/9 requiring transfusion. Patient reports one episode of melena two days ago (she is a poor historian).   -Last colonoscopy and endoscopy are unknown; think it may have been >5 years ago  -CBC from 8/14 show Hgb 8.2, Hct 27, MCV 90. Normal WBC and platelet count. Iron studies with normal iron (37), TIBC (435), and low iron saturation (9). This is in light of recent blood transfusion. Normal folate and B12 level. Reticulocyte count noted to be elevated to 6%  -Patient is with fatigue. Denies shortness of breath (aside from baseline COPD) or chest pain. Continues to be on Eliquis for Afib  -Initial work up from consultation confirmed iron deficiency anemia, normal folate/b12 levels. No indication of hemolysis seen with normal LDH and haptoglobin. Myeloma work up has been negative with no M spike on serum protein electrophoresis and normal k/l free light chain ratio. Peripheral smear with normal total WBC without granulocytic dysplasia or blasts.  -Received Ferumoxytol 8/29/2023    3. Malignancy associated pain  - Currently denies        Discussed possible differential diagnoses, testing, treatment, recommended non-pharmacological interventions, risks, warning signs to monitor for that would indicate need for follow-up in clinic or ER. If no improvement with these regimens or you have new or worsening symptoms follow-up. Patient verbalizes understanding and agreement with plan of care. Denies further needs or concerns.     Patient was given instructions and counseling regarding her condition and for health maintenance advised.       All questions were answered to her satisfaction.              Meds ordered during this visit  No orders of the defined types were placed in this encounter.      Visit Diagnoses  No diagnosis found.          Follow Up   Follow up with Dr. Bond on 11/22/23 with repeat iron studies, ferritin as well as treatment  labs  Order CT chest for follow up after last cycle, followed by maintenance durvalumab        This document has been electronically signed by ERASTO Lundberg   November 1, 2023 13:21 EDT    Dictated Utilizing Dragon Dictation: Part of this note may be an electronic transcription/translation of spoken language to printed text using the Dragon Dictation System.

## 2023-11-02 ENCOUNTER — OFFICE VISIT (OUTPATIENT)
Dept: CARDIOLOGY | Facility: CLINIC | Age: 78
End: 2023-11-02
Payer: MEDICARE

## 2023-11-02 ENCOUNTER — TELEPHONE (OUTPATIENT)
Dept: CARDIOLOGY | Facility: CLINIC | Age: 78
End: 2023-11-02
Payer: MEDICARE

## 2023-11-02 ENCOUNTER — HOSPITAL ENCOUNTER (OUTPATIENT)
Dept: RADIATION ONCOLOGY | Facility: HOSPITAL | Age: 78
Discharge: HOME | End: 2023-11-02

## 2023-11-02 VITALS
HEIGHT: 71 IN | BODY MASS INDEX: 31.11 KG/M2 | OXYGEN SATURATION: 94 % | SYSTOLIC BLOOD PRESSURE: 124 MMHG | DIASTOLIC BLOOD PRESSURE: 57 MMHG | WEIGHT: 222.2 LBS | HEART RATE: 73 BPM

## 2023-11-02 DIAGNOSIS — I10 ESSENTIAL HYPERTENSION: Chronic | ICD-10-CM

## 2023-11-02 DIAGNOSIS — E78.5 HYPERLIPIDEMIA LDL GOAL <100: ICD-10-CM

## 2023-11-02 DIAGNOSIS — I48.0 PAROXYSMAL ATRIAL FIBRILLATION: Primary | Chronic | ICD-10-CM

## 2023-11-02 PROBLEM — I50.32 CHRONIC HEART FAILURE WITH PRESERVED EJECTION FRACTION: Chronic | Status: ACTIVE | Noted: 2017-07-20

## 2023-11-02 LAB
RAD ONC ARIA COURSE ID: NORMAL
RAD ONC ARIA COURSE INTENT: NORMAL
RAD ONC ARIA COURSE LAST TREATMENT DATE: NORMAL
RAD ONC ARIA COURSE START DATE: NORMAL
RAD ONC ARIA COURSE TREATMENT ELAPSED DAYS: 23
RAD ONC ARIA FIRST TREATMENT DATE: NORMAL
RAD ONC ARIA PLAN FRACTIONS TREATED TO DATE: 17
RAD ONC ARIA PLAN ID: NORMAL
RAD ONC ARIA PLAN PRESCRIBED DOSE PER FRACTION: 2 GY
RAD ONC ARIA PLAN PRIMARY REFERENCE POINT: NORMAL
RAD ONC ARIA PLAN TOTAL FRACTIONS PRESCRIBED: 30
RAD ONC ARIA PLAN TOTAL PRESCRIBED DOSE: 6000 CGY
RAD ONC ARIA REFERENCE POINT DOSAGE GIVEN TO DATE: 34 GY
RAD ONC ARIA REFERENCE POINT ID: NORMAL
RAD ONC ARIA REFERENCE POINT SESSION DOSAGE GIVEN: 2 GY

## 2023-11-02 PROCEDURE — 77336 RADIATION PHYSICS CONSULT: CPT | Performed by: RADIOLOGY

## 2023-11-02 PROCEDURE — 77386: CPT | Performed by: RADIOLOGY

## 2023-11-02 PROCEDURE — 77014 CHG CT GUIDANCE RADIATION THERAPY FLDS PLACEMENT: CPT | Performed by: RADIOLOGY

## 2023-11-02 RX ORDER — LISINOPRIL 5 MG/1
5 TABLET ORAL DAILY
Qty: 90 TABLET | Refills: 3 | Status: SHIPPED | OUTPATIENT
Start: 2023-11-02

## 2023-11-02 RX ORDER — ATORVASTATIN CALCIUM 20 MG/1
20 TABLET, FILM COATED ORAL DAILY
Qty: 90 TABLET | Refills: 3 | Status: SHIPPED | OUTPATIENT
Start: 2023-11-02

## 2023-11-02 NOTE — PROGRESS NOTES
"Chief Complaint  Follow-up    Subjective          Mayra Hays presents to [facility] for follow up.    History of Present Illness  Ms. Hays presents for routine follow-up of accessible atrial fibrillation, chronic HFpEF, and hypertension.  Her last visit to this clinic was 04/28/2023 with Darek Nelson PA-C.  Changes were made to her medications at that visit.    She is undergoing chemotherapy for lung cancer.  She was in the emergency room 10/09/2023 due to having had a reaction to a new chemotherapy drug.    Reports that she occasionally senses palpitations but they do not cause her to feel short of breath.  She has mild swelling in both lower legs that resolves when she elevates them.  Denies chest pain or increase in her baseline shortness of air.  Tobacco Use: Medium Risk (11/2/2023)    Patient History     Smoking Tobacco Use: Former     Smokeless Tobacco Use: Never     Passive Exposure: Past       Objective     Vital Signs:   /57 (BP Location: Left arm, Patient Position: Sitting, Cuff Size: Adult)   Pulse 73   Ht 180.3 cm (71\")   Wt 101 kg (222 lb 3.2 oz)   SpO2 94%   BMI 30.99 kg/m²       Physical Exam     Result Review :   The following data was reviewed by: ERASTO Odell on 11/02/2023:    Data reviewed : Cardiology studies as detailed below      Last Cardiac Cath  Results for orders placed during the hospital encounter of 11/22/21    Cardiac Catheterization/Vascular Study    Narrative  · Angiographically normal coronary arteries  · False positive stress test  · Non-cardiac etiology of chest pain  · Unchanged since previous cath 2017  · Right radial TR band      Last Stress test  Results for orders placed during the hospital encounter of 06/07/21    Stress Test With Myocardial Perfusion    Interpretation Summary  · A pharmacological stress test was performed using regadenoson without low-level exercise.  · Findings consistent with a normal ECG stress " test.  · Myocardial perfusion imaging indicates a small-sized, mild degree of ischemia located in the lateral wall.  · Normal LV cavity size. Normal LV wall motion noted.  · Left ventricular ejection fraction is normal. (Calculated EF = 64%).  · Impressions are consistent with a low risk study.       Last Echo  Results for orders placed during the hospital encounter of 06/07/21    Adult Transthoracic Echo Complete W/ Cont if Necessary Per Protocol    Interpretation Summary  · The study is technically difficult for diagnosis.  · Normal left ventricular cavity size and wall thickness noted. All left ventricular wall segments contract normally  · Left ventricular ejection fraction appears to be 61 - 65%.  · Left ventricular diastolic function is consistent with (grade I) impaired relaxation.  · The aortic valve is structurally normal with no regurgitation or stenosis present.  · The mitral valve is structurally normal with no regurgitation or significant stenosis present.  · There is no evidence of pericardial effusion.             Current Outpatient Medications   Medication Sig Dispense Refill    acetaminophen (TYLENOL) 325 MG tablet Take 2 tablets by mouth Every 4 (Four) Hours As Needed for Mild Pain. 30 tablet 0    albuterol sulfate  (90 Base) MCG/ACT inhaler Inhale 2 puffs Every 4 (Four) Hours As Needed for Wheezing or Shortness of Air. 18 g 8    apixaban (ELIQUIS) 5 MG tablet tablet Take 1 tablet by mouth Every 12 (Twelve) Hours. 180 tablet 3    atorvastatin (LIPITOR) 20 MG tablet Take 1 tablet by mouth Daily. 90 tablet 3    Budeson-Glycopyrrol-Formoterol (Breztri Aerosphere) 160-9-4.8 MCG/ACT aerosol inhaler Inhale 2 puffs 2 (Two) Times a Day. 10.7 g 8    esomeprazole (nexIUM) 20 MG capsule Take 1 capsule by mouth Daily.      ibuprofen (ADVIL,MOTRIN) 600 MG tablet Take 1 tablet by mouth Every 6 (Six) Hours As Needed for Mild Pain. 30 tablet 0    ipratropium-albuterol (DUO-NEB) 0.5-2.5 mg/3 ml nebulizer USE  1 VIAL IN NEBULIZER 4 TIMES DAILY - and as needed 360 mL 11    lidocaine-prilocaine (EMLA) 2.5-2.5 % cream Apply to port-a-cath site 30 minutes prior to arrival at infusion center. Cover with plastic wrap. 30 g 1    lisinopril (PRINIVIL,ZESTRIL) 5 MG tablet Take 1 tablet by mouth Daily. 90 tablet 3    loratadine (CLARITIN) 10 MG tablet Take 1 tablet by mouth Daily As Needed for Allergies.      methocarbamol (Robaxin) 500 MG tablet Take 1 tablet by mouth 3 (Three) Times a Day As Needed for Muscle Spasms. 30 tablet 2    O2 (OXYGEN) Inhale 2 L/min As Needed.      ondansetron (Zofran) 8 MG tablet Take 1 tablet by mouth Every 8 (Eight) Hours As Needed for Nausea or Vomiting. 30 tablet 1    pantoprazole (PROTONIX) 40 MG EC tablet Take 1 tablet by mouth Daily As Needed (acid reflux).      potassium chloride (K-DUR,KLOR-CON) 20 MEQ CR tablet Take 1 tablet by mouth Daily.      prochlorperazine (COMPAZINE) 10 MG tablet Take 1 tablet by mouth Every 6 (Six) Hours As Needed for Nausea or Vomiting. 30 tablet 1    Stool Softener 100 MG capsule Take 1 capsule by mouth 2 (two) times a day.      trimethoprim (TRIMPEX) 100 MG tablet Take 1 tablet by mouth 2 (Two) Times a Day. 30 tablet 1     No current facility-administered medications for this visit.            Assessment and Plan    Problem List Items Addressed This Visit       Essential hypertension (Chronic)    Relevant Medications    lisinopril (PRINIVIL,ZESTRIL) 5 MG tablet    Paroxysmal atrial fibrillation - Primary (Chronic)    Relevant Medications    apixaban (ELIQUIS) 5 MG tablet tablet    Other Relevant Orders    Adult Transthoracic Echo Complete w/ Color, Spectral and Contrast if necessary per protocol     Other Visit Diagnoses       Hyperlipidemia LDL goal <100        Relevant Medications    atorvastatin (LIPITOR) 20 MG tablet          Diagnoses and all orders for this visit:    1. Paroxysmal atrial fibrillation (Primary)  -     apixaban (ELIQUIS) 5 MG tablet tablet; Take  1 tablet by mouth Every 12 (Twelve) Hours.  Dispense: 180 tablet; Refill: 3  -     Adult Transthoracic Echo Complete w/ Color, Spectral and Contrast if necessary per protocol; Future    2. Essential hypertension  -     lisinopril (PRINIVIL,ZESTRIL) 5 MG tablet; Take 1 tablet by mouth Daily.  Dispense: 90 tablet; Refill: 3    3. Hyperlipidemia LDL goal <100  -     atorvastatin (LIPITOR) 20 MG tablet; Take 1 tablet by mouth Daily.  Dispense: 90 tablet; Refill: 3      Message sent to request most recent labs from primary care provider.  No adjustment to medications at this time.  Plan for echocardiogram in 6 months in light of undergoing chemotherapy and radiation to the chest.      Follow Up     Return in about 6 months (around 5/2/2024) for Next scheduled follow up.    Patient was given instructions and counseling regarding her condition or for health maintenance advice. Please see specific information pulled into the AVS if appropriate.       Electronically signed by ERASTO Odell, 11/02/23, 2:36 PM EDT.      Dictated Utilizing Dragon Dictation: Part of this note may be an electronic transcription/translation of spoken language to printed text using the Dragon Dictation System

## 2023-11-02 NOTE — TELEPHONE ENCOUNTER
Called PCP and they stated to fax a request for her labs.       ----- Message from ERASTO Odell sent at 11/2/2023  2:31 PM EDT -----  Please get recent labs from PCP - specifically, lipid panel

## 2023-11-03 ENCOUNTER — HOSPITAL ENCOUNTER (OUTPATIENT)
Dept: RADIATION ONCOLOGY | Facility: HOSPITAL | Age: 78
Discharge: HOME | End: 2023-11-03

## 2023-11-03 LAB
RAD ONC ARIA COURSE ID: NORMAL
RAD ONC ARIA COURSE INTENT: NORMAL
RAD ONC ARIA COURSE LAST TREATMENT DATE: NORMAL
RAD ONC ARIA COURSE START DATE: NORMAL
RAD ONC ARIA COURSE TREATMENT ELAPSED DAYS: 24
RAD ONC ARIA FIRST TREATMENT DATE: NORMAL
RAD ONC ARIA PLAN FRACTIONS TREATED TO DATE: 18
RAD ONC ARIA PLAN ID: NORMAL
RAD ONC ARIA PLAN PRESCRIBED DOSE PER FRACTION: 2 GY
RAD ONC ARIA PLAN PRIMARY REFERENCE POINT: NORMAL
RAD ONC ARIA PLAN TOTAL FRACTIONS PRESCRIBED: 30
RAD ONC ARIA PLAN TOTAL PRESCRIBED DOSE: 6000 CGY
RAD ONC ARIA REFERENCE POINT DOSAGE GIVEN TO DATE: 36 GY
RAD ONC ARIA REFERENCE POINT ID: NORMAL
RAD ONC ARIA REFERENCE POINT SESSION DOSAGE GIVEN: 2 GY

## 2023-11-03 PROCEDURE — 77014 CHG CT GUIDANCE RADIATION THERAPY FLDS PLACEMENT: CPT | Performed by: RADIOLOGY

## 2023-11-03 PROCEDURE — 77386: CPT | Performed by: RADIOLOGY

## 2023-11-06 ENCOUNTER — HOSPITAL ENCOUNTER (OUTPATIENT)
Dept: RADIATION ONCOLOGY | Facility: HOSPITAL | Age: 78
Discharge: HOME | End: 2023-11-06
Payer: MEDICARE

## 2023-11-06 PROBLEM — E78.5 HYPERLIPIDEMIA LDL GOAL <100: Chronic | Status: ACTIVE | Noted: 2023-11-06

## 2023-11-06 PROBLEM — E78.5 HYPERLIPIDEMIA LDL GOAL <100: Status: ACTIVE | Noted: 2023-11-06

## 2023-11-06 LAB
RAD ONC ARIA COURSE ID: NORMAL
RAD ONC ARIA COURSE INTENT: NORMAL
RAD ONC ARIA COURSE LAST TREATMENT DATE: NORMAL
RAD ONC ARIA COURSE START DATE: NORMAL
RAD ONC ARIA COURSE TREATMENT ELAPSED DAYS: 27
RAD ONC ARIA FIRST TREATMENT DATE: NORMAL
RAD ONC ARIA PLAN FRACTIONS TREATED TO DATE: 19
RAD ONC ARIA PLAN ID: NORMAL
RAD ONC ARIA PLAN PRESCRIBED DOSE PER FRACTION: 2 GY
RAD ONC ARIA PLAN PRIMARY REFERENCE POINT: NORMAL
RAD ONC ARIA PLAN TOTAL FRACTIONS PRESCRIBED: 30
RAD ONC ARIA PLAN TOTAL PRESCRIBED DOSE: 6000 CGY
RAD ONC ARIA REFERENCE POINT DOSAGE GIVEN TO DATE: 38 GY
RAD ONC ARIA REFERENCE POINT ID: NORMAL
RAD ONC ARIA REFERENCE POINT SESSION DOSAGE GIVEN: 2 GY

## 2023-11-06 PROCEDURE — 77386: CPT | Performed by: RADIOLOGY

## 2023-11-06 PROCEDURE — 77014 CHG CT GUIDANCE RADIATION THERAPY FLDS PLACEMENT: CPT | Performed by: RADIOLOGY

## 2023-11-07 ENCOUNTER — HOSPITAL ENCOUNTER (OUTPATIENT)
Dept: RADIATION ONCOLOGY | Facility: HOSPITAL | Age: 78
Discharge: HOME | End: 2023-11-07

## 2023-11-07 ENCOUNTER — LAB (OUTPATIENT)
Dept: ONCOLOGY | Facility: HOSPITAL | Age: 78
End: 2023-11-07
Payer: MEDICARE

## 2023-11-07 VITALS
RESPIRATION RATE: 18 BRPM | DIASTOLIC BLOOD PRESSURE: 80 MMHG | SYSTOLIC BLOOD PRESSURE: 132 MMHG | OXYGEN SATURATION: 98 % | HEART RATE: 64 BPM | TEMPERATURE: 98.6 F

## 2023-11-07 VITALS
TEMPERATURE: 98.6 F | RESPIRATION RATE: 18 BRPM | DIASTOLIC BLOOD PRESSURE: 80 MMHG | OXYGEN SATURATION: 98 % | SYSTOLIC BLOOD PRESSURE: 132 MMHG | HEART RATE: 64 BPM

## 2023-11-07 DIAGNOSIS — C34.91 NON-SMALL CELL CANCER OF RIGHT LUNG: Primary | ICD-10-CM

## 2023-11-07 DIAGNOSIS — C34.91 NON-SMALL CELL CANCER OF RIGHT LUNG: ICD-10-CM

## 2023-11-07 DIAGNOSIS — C34.11 PRIMARY CANCER OF RIGHT UPPER LOBE OF LUNG: ICD-10-CM

## 2023-11-07 LAB
ALBUMIN SERPL-MCNC: 4.2 G/DL (ref 3.5–5.2)
ALBUMIN/GLOB SERPL: 1.3 G/DL
ALP SERPL-CCNC: 130 U/L (ref 39–117)
ALT SERPL W P-5'-P-CCNC: 10 U/L (ref 1–33)
ANION GAP SERPL CALCULATED.3IONS-SCNC: 9.3 MMOL/L (ref 5–15)
AST SERPL-CCNC: 14 U/L (ref 1–32)
BASOPHILS # BLD AUTO: 0.05 10*3/MM3 (ref 0–0.2)
BASOPHILS NFR BLD AUTO: 0.9 % (ref 0–1.5)
BILIRUB SERPL-MCNC: <0.2 MG/DL (ref 0–1.2)
BUN SERPL-MCNC: 19 MG/DL (ref 8–23)
BUN/CREAT SERPL: 18.6 (ref 7–25)
CALCIUM SPEC-SCNC: 9.6 MG/DL (ref 8.6–10.5)
CHLORIDE SERPL-SCNC: 104 MMOL/L (ref 98–107)
CO2 SERPL-SCNC: 27.7 MMOL/L (ref 22–29)
CREAT SERPL-MCNC: 1.02 MG/DL (ref 0.57–1)
DEPRECATED RDW RBC AUTO: 49.4 FL (ref 37–54)
EGFRCR SERPLBLD CKD-EPI 2021: 56.8 ML/MIN/1.73
EOSINOPHIL # BLD AUTO: 0.26 10*3/MM3 (ref 0–0.4)
EOSINOPHIL NFR BLD AUTO: 4.8 % (ref 0.3–6.2)
ERYTHROCYTE [DISTWIDTH] IN BLOOD BY AUTOMATED COUNT: 16.2 % (ref 12.3–15.4)
GLOBULIN UR ELPH-MCNC: 3.2 GM/DL
GLUCOSE SERPL-MCNC: 133 MG/DL (ref 65–99)
HCT VFR BLD AUTO: 34.9 % (ref 34–46.6)
HGB BLD-MCNC: 10.7 G/DL (ref 12–15.9)
IMM GRANULOCYTES # BLD AUTO: 0.03 10*3/MM3 (ref 0–0.05)
IMM GRANULOCYTES NFR BLD AUTO: 0.6 % (ref 0–0.5)
LYMPHOCYTES # BLD AUTO: 1.1 10*3/MM3 (ref 0.7–3.1)
LYMPHOCYTES NFR BLD AUTO: 20.2 % (ref 19.6–45.3)
MCH RBC QN AUTO: 26.5 PG (ref 26.6–33)
MCHC RBC AUTO-ENTMCNC: 30.7 G/DL (ref 31.5–35.7)
MCV RBC AUTO: 86.4 FL (ref 79–97)
MONOCYTES # BLD AUTO: 0.53 10*3/MM3 (ref 0.1–0.9)
MONOCYTES NFR BLD AUTO: 9.7 % (ref 5–12)
NEUTROPHILS NFR BLD AUTO: 3.48 10*3/MM3 (ref 1.7–7)
NEUTROPHILS NFR BLD AUTO: 63.8 % (ref 42.7–76)
NRBC BLD AUTO-RTO: 0 /100 WBC (ref 0–0.2)
PLATELET # BLD AUTO: 214 10*3/MM3 (ref 140–450)
PMV BLD AUTO: 11.1 FL (ref 6–12)
POTASSIUM SERPL-SCNC: 4.4 MMOL/L (ref 3.5–5.2)
PROT SERPL-MCNC: 7.4 G/DL (ref 6–8.5)
RAD ONC ARIA COURSE ID: NORMAL
RAD ONC ARIA COURSE INTENT: NORMAL
RAD ONC ARIA COURSE LAST TREATMENT DATE: NORMAL
RAD ONC ARIA COURSE START DATE: NORMAL
RAD ONC ARIA COURSE TREATMENT ELAPSED DAYS: 28
RAD ONC ARIA FIRST TREATMENT DATE: NORMAL
RAD ONC ARIA PLAN FRACTIONS TREATED TO DATE: 20
RAD ONC ARIA PLAN ID: NORMAL
RAD ONC ARIA PLAN PRESCRIBED DOSE PER FRACTION: 2 GY
RAD ONC ARIA PLAN PRIMARY REFERENCE POINT: NORMAL
RAD ONC ARIA PLAN TOTAL FRACTIONS PRESCRIBED: 30
RAD ONC ARIA PLAN TOTAL PRESCRIBED DOSE: 6000 CGY
RAD ONC ARIA REFERENCE POINT DOSAGE GIVEN TO DATE: 40 GY
RAD ONC ARIA REFERENCE POINT ID: NORMAL
RAD ONC ARIA REFERENCE POINT SESSION DOSAGE GIVEN: 2 GY
RBC # BLD AUTO: 4.04 10*6/MM3 (ref 3.77–5.28)
SODIUM SERPL-SCNC: 141 MMOL/L (ref 136–145)
WBC NRBC COR # BLD: 5.45 10*3/MM3 (ref 3.4–10.8)

## 2023-11-07 PROCEDURE — 80053 COMPREHEN METABOLIC PANEL: CPT

## 2023-11-07 PROCEDURE — 77386: CPT | Performed by: RADIOLOGY

## 2023-11-07 PROCEDURE — 85025 COMPLETE CBC W/AUTO DIFF WBC: CPT

## 2023-11-07 PROCEDURE — 36415 COLL VENOUS BLD VENIPUNCTURE: CPT

## 2023-11-07 PROCEDURE — 77014 CHG CT GUIDANCE RADIATION THERAPY FLDS PLACEMENT: CPT | Performed by: RADIOLOGY

## 2023-11-07 NOTE — PROGRESS NOTES
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  The patient is receiving lobaplatin and Taxol chemotherapy administered under Dr. Bond's supervision.     This patient was seen today for an on treatment visit.  To date, the patient has received 4000 cGy of a 6000 cGy VMAT regimen.    Radiation Treatments       Active   Plans   RUL   Most recent treatment: Dose planned: 200 cGy (fraction 20 on 2023)   Total: Dose planned: 6,000 cGy (30 fractions)   Elapsed Days: 28      Reference Points   PTV   Most recent treatment: Dose given: 200 cGy (on 2023)   Total: Dose given: 4,000 cGy   Elapsed Days: 28           Historical   No historical radiation treatments to show.              Subjective      Treatment tolerance:  Mrs. Hays appears to be tolerating radiotherapy well.  She reports some lessening of shortness of breath.  The patient denies nausea, dysphagia, odynophagia, and hemoptysis.  Her appetite and energy levels are fair.    Medications:     Current Outpatient Medications:     acetaminophen (TYLENOL) 325 MG tablet, Take 2 tablets by mouth Every 4 (Four) Hours As Needed for Mild Pain., Disp: 30 tablet, Rfl: 0    albuterol sulfate  (90 Base) MCG/ACT inhaler, Inhale 2 puffs Every 4 (Four) Hours As Needed for Wheezing or Shortness of Air., Disp: 18 g, Rfl: 8    apixaban (ELIQUIS) 5 MG tablet tablet, Take 1 tablet by mouth Every 12 (Twelve) Hours., Disp: 180 tablet, Rfl: 3    atorvastatin (LIPITOR) 20 MG tablet, Take 1 tablet by mouth Daily., Disp: 90 tablet, Rfl: 3    Budeson-Glycopyrrol-Formoterol (Breztri Aerosphere) 160-9-4.8 MCG/ACT aerosol inhaler, Inhale 2 puffs 2 (Two) Times a Day., Disp: 10.7 g, Rfl: 8    esomeprazole (nexIUM) 20 MG capsule, Take 1 capsule by mouth Daily., Disp: , Rfl:     ibuprofen  (ADVIL,MOTRIN) 600 MG tablet, Take 1 tablet by mouth Every 6 (Six) Hours As Needed for Mild Pain., Disp: 30 tablet, Rfl: 0    ipratropium-albuterol (DUO-NEB) 0.5-2.5 mg/3 ml nebulizer, USE 1 VIAL IN NEBULIZER 4 TIMES DAILY - and as needed, Disp: 360 mL, Rfl: 11    lidocaine-prilocaine (EMLA) 2.5-2.5 % cream, Apply to port-a-cath site 30 minutes prior to arrival at infusion center. Cover with plastic wrap., Disp: 30 g, Rfl: 1    lisinopril (PRINIVIL,ZESTRIL) 5 MG tablet, Take 1 tablet by mouth Daily., Disp: 90 tablet, Rfl: 3    loratadine (CLARITIN) 10 MG tablet, Take 1 tablet by mouth Daily As Needed for Allergies., Disp: , Rfl:     methocarbamol (Robaxin) 500 MG tablet, Take 1 tablet by mouth 3 (Three) Times a Day As Needed for Muscle Spasms., Disp: 30 tablet, Rfl: 2    O2 (OXYGEN), Inhale 2 L/min As Needed., Disp: , Rfl:     ondansetron (Zofran) 8 MG tablet, Take 1 tablet by mouth Every 8 (Eight) Hours As Needed for Nausea or Vomiting., Disp: 30 tablet, Rfl: 1    pantoprazole (PROTONIX) 40 MG EC tablet, Take 1 tablet by mouth Daily As Needed (acid reflux)., Disp: , Rfl:     potassium chloride (K-DUR,KLOR-CON) 20 MEQ CR tablet, Take 1 tablet by mouth Daily., Disp: , Rfl:     prochlorperazine (COMPAZINE) 10 MG tablet, Take 1 tablet by mouth Every 6 (Six) Hours As Needed for Nausea or Vomiting., Disp: 30 tablet, Rfl: 1    Stool Softener 100 MG capsule, Take 1 capsule by mouth 2 (two) times a day., Disp: , Rfl:     trimethoprim (TRIMPEX) 100 MG tablet, Take 1 tablet by mouth 2 (Two) Times a Day., Disp: 30 tablet, Rfl: 1    Allergies:   Allergies   Allergen Reactions    Paclitaxel Anaphylaxis     Objective     Physical Exam:  The patient is a pleasant elderly white female in no acute distress.  Vital signs as below.  KPS 90.    Vital Signs:   Vitals:    11/07/23 1549   BP: 132/80   Pulse: 64   Resp: 18   Temp: 98.6 °F (37 °C)   TempSrc: Temporal   SpO2: 98%   PainSc: 0-No pain     There is no height or weight on file  to calculate BMI.     Neck: Short, supple no detectable cervical or periclavicular adenopathy  Heart: Regular rate and rhythm  Lungs: Clear to auscultation bilaterally  Integumentary: No radiation changes are noted over the anterior posterior chest walls  Abdomen: Not examined  Lymphatics: No cervical, periclavicular or axillary adenopathy    Current Total XRT Dose (cGY):    Radiation Treatments       Active   Plans   RUL   Most recent treatment: Dose planned: 200 cGy (fraction 20 on 11/7/2023)   Total: Dose planned: 6,000 cGy (30 fractions)   Elapsed Days: 28      Reference Points   PTV   Most recent treatment: Dose given: 200 cGy (on 11/7/2023)   Total: Dose given: 4,000 cGy   Elapsed Days: 28           Historical   No historical radiation treatments to show.              Plan      Plan:   Patient was seen today for an on treatment visit. Patient is receiving radiation therapy to the malignancy in the right upper lung and mid mediastinal lymph nodes.. Patient is stable and tolerating radiation therapy well with minimal side effects. Continue with radiation therapy.  The patient has 10 treatment fractions pending.    I have reviewed treatment setup notes, checked and approved the daily guidance images. I reviewed dose delivery, treatment parameters and deemed them appropriate. We plan to continue radiation therapy as prescribed.     Digital speech recognition software was used to dictate parts of this note, some dictation errors may occur.    Nima Smith MD   11/07/23 15:55 EST

## 2023-11-08 ENCOUNTER — INFUSION (OUTPATIENT)
Dept: ONCOLOGY | Facility: HOSPITAL | Age: 78
End: 2023-11-08
Payer: MEDICARE

## 2023-11-08 ENCOUNTER — OFFICE VISIT (OUTPATIENT)
Dept: ONCOLOGY | Facility: CLINIC | Age: 78
End: 2023-11-08
Payer: MEDICARE

## 2023-11-08 ENCOUNTER — HOSPITAL ENCOUNTER (OUTPATIENT)
Dept: RADIATION ONCOLOGY | Facility: HOSPITAL | Age: 78
Discharge: HOME | End: 2023-11-08

## 2023-11-08 VITALS
BODY MASS INDEX: 31.46 KG/M2 | DIASTOLIC BLOOD PRESSURE: 72 MMHG | HEART RATE: 78 BPM | RESPIRATION RATE: 18 BRPM | OXYGEN SATURATION: 93 % | SYSTOLIC BLOOD PRESSURE: 141 MMHG | WEIGHT: 225.6 LBS | TEMPERATURE: 97.5 F

## 2023-11-08 VITALS
BODY MASS INDEX: 31.46 KG/M2 | RESPIRATION RATE: 18 BRPM | SYSTOLIC BLOOD PRESSURE: 141 MMHG | OXYGEN SATURATION: 93 % | DIASTOLIC BLOOD PRESSURE: 72 MMHG | TEMPERATURE: 97.5 F | WEIGHT: 225.6 LBS | HEART RATE: 78 BPM

## 2023-11-08 DIAGNOSIS — C34.91 NON-SMALL CELL CANCER OF RIGHT LUNG: ICD-10-CM

## 2023-11-08 DIAGNOSIS — C34.91 NON-SMALL CELL CANCER OF RIGHT LUNG: Primary | ICD-10-CM

## 2023-11-08 DIAGNOSIS — C34.11 PRIMARY CANCER OF RIGHT UPPER LOBE OF LUNG: Primary | ICD-10-CM

## 2023-11-08 DIAGNOSIS — Z95.828 PORT-A-CATH IN PLACE: ICD-10-CM

## 2023-11-08 DIAGNOSIS — D50.9 IRON DEFICIENCY ANEMIA, UNSPECIFIED IRON DEFICIENCY ANEMIA TYPE: ICD-10-CM

## 2023-11-08 LAB
RAD ONC ARIA COURSE ID: NORMAL
RAD ONC ARIA COURSE INTENT: NORMAL
RAD ONC ARIA COURSE LAST TREATMENT DATE: NORMAL
RAD ONC ARIA COURSE START DATE: NORMAL
RAD ONC ARIA COURSE TREATMENT ELAPSED DAYS: 29
RAD ONC ARIA FIRST TREATMENT DATE: NORMAL
RAD ONC ARIA PLAN FRACTIONS TREATED TO DATE: 21
RAD ONC ARIA PLAN ID: NORMAL
RAD ONC ARIA PLAN PRESCRIBED DOSE PER FRACTION: 2 GY
RAD ONC ARIA PLAN PRIMARY REFERENCE POINT: NORMAL
RAD ONC ARIA PLAN TOTAL FRACTIONS PRESCRIBED: 30
RAD ONC ARIA PLAN TOTAL PRESCRIBED DOSE: 6000 CGY
RAD ONC ARIA REFERENCE POINT DOSAGE GIVEN TO DATE: 42 GY
RAD ONC ARIA REFERENCE POINT ID: NORMAL
RAD ONC ARIA REFERENCE POINT SESSION DOSAGE GIVEN: 2 GY

## 2023-11-08 PROCEDURE — 25810000003 SODIUM CHLORIDE 0.9 % SOLUTION: Performed by: INTERNAL MEDICINE

## 2023-11-08 PROCEDURE — 96375 TX/PRO/DX INJ NEW DRUG ADDON: CPT

## 2023-11-08 PROCEDURE — 77014 CHG CT GUIDANCE RADIATION THERAPY FLDS PLACEMENT: CPT | Performed by: RADIOLOGY

## 2023-11-08 PROCEDURE — 77336 RADIATION PHYSICS CONSULT: CPT | Performed by: RADIOLOGY

## 2023-11-08 PROCEDURE — 96413 CHEMO IV INFUSION 1 HR: CPT

## 2023-11-08 PROCEDURE — 25010000002 PALONOSETRON 0.25 MG/5ML SOLUTION PREFILLED SYRINGE: Performed by: INTERNAL MEDICINE

## 2023-11-08 PROCEDURE — 25010000002 HEPARIN LOCK FLUSH PER 10 UNITS: Performed by: NURSE PRACTITIONER

## 2023-11-08 PROCEDURE — 25010000002 CARBOPLATIN PER 50 MG: Performed by: INTERNAL MEDICINE

## 2023-11-08 PROCEDURE — 77386: CPT | Performed by: RADIOLOGY

## 2023-11-08 PROCEDURE — 25010000002 DEXAMETHASONE SODIUM PHOSPHATE 20 MG/5ML SOLUTION: Performed by: INTERNAL MEDICINE

## 2023-11-08 RX ORDER — HEPARIN SODIUM (PORCINE) LOCK FLUSH IV SOLN 100 UNIT/ML 100 UNIT/ML
300 SOLUTION INTRAVENOUS ONCE
OUTPATIENT
Start: 2023-11-08

## 2023-11-08 RX ORDER — PALONOSETRON 0.05 MG/ML
0.25 INJECTION, SOLUTION INTRAVENOUS ONCE
Status: COMPLETED | OUTPATIENT
Start: 2023-11-08 | End: 2023-11-08

## 2023-11-08 RX ORDER — HEPARIN SODIUM (PORCINE) LOCK FLUSH IV SOLN 100 UNIT/ML 100 UNIT/ML
500 SOLUTION INTRAVENOUS AS NEEDED
Status: DISCONTINUED | OUTPATIENT
Start: 2023-11-08 | End: 2023-11-08 | Stop reason: HOSPADM

## 2023-11-08 RX ORDER — SODIUM CHLORIDE 0.9 % (FLUSH) 0.9 %
10 SYRINGE (ML) INJECTION AS NEEDED
Status: DISCONTINUED | OUTPATIENT
Start: 2023-11-08 | End: 2023-11-08 | Stop reason: HOSPADM

## 2023-11-08 RX ORDER — HEPARIN SODIUM (PORCINE) LOCK FLUSH IV SOLN 100 UNIT/ML 100 UNIT/ML
500 SOLUTION INTRAVENOUS AS NEEDED
OUTPATIENT
Start: 2023-11-08

## 2023-11-08 RX ORDER — SODIUM CHLORIDE 0.9 % (FLUSH) 0.9 %
10 SYRINGE (ML) INJECTION AS NEEDED
OUTPATIENT
Start: 2023-11-08

## 2023-11-08 RX ORDER — SODIUM CHLORIDE 0.9 % (FLUSH) 0.9 %
20 SYRINGE (ML) INJECTION AS NEEDED
OUTPATIENT
Start: 2023-11-08

## 2023-11-08 RX ORDER — SODIUM CHLORIDE 9 MG/ML
250 INJECTION, SOLUTION INTRAVENOUS ONCE
Status: COMPLETED | OUTPATIENT
Start: 2023-11-08 | End: 2023-11-08

## 2023-11-08 RX ADMIN — PALONOSETRON 0.25 MG: 0.25 INJECTION, SOLUTION INTRAVENOUS at 13:29

## 2023-11-08 RX ADMIN — CARBOPLATIN 160 MG: 10 INJECTION, SOLUTION INTRAVENOUS at 14:04

## 2023-11-08 RX ADMIN — HEPARIN 500 UNITS: 100 SYRINGE at 14:40

## 2023-11-08 RX ADMIN — DEXAMETHASONE SODIUM PHOSPHATE 12 MG: 4 INJECTION, SOLUTION INTRA-ARTICULAR; INTRALESIONAL; INTRAMUSCULAR; INTRAVENOUS; SOFT TISSUE at 13:30

## 2023-11-08 RX ADMIN — Medication 10 ML: at 14:40

## 2023-11-08 RX ADMIN — SODIUM CHLORIDE 250 ML: 9 INJECTION, SOLUTION INTRAVENOUS at 13:29

## 2023-11-08 NOTE — PROGRESS NOTES
Subjective     Date: 2023    Referring Provider  No ref. provider found    Chief Complaint  Symptomatic anemia   2. Non small Cell Lung Cancer  Cancer Staging   Stage IIIA (cT1c, cN2, cM0)        Subjective      Mayra Hays is a 78 y.o. female who presents today to [facility] for follow up.    HPI:   78 y.o. female with past medical history of diabetes mellitus type 2, hyperlipidemia, COPD, hypertension, atrial fibrillation on anticoagulation (Eliquis), h/o CVA and previous tobacco use presents for symptomatic anemia and NSCLC.    She is present today with her daughter, Hailey, who is assisting with history. Pt started work up for RUL lung nodule , since then noticed to have a decrease in her Hgb to 10.7 from normal in 2023. More recently, her Hgb 9 was 6.5, she was directed to ED where she received 1U PRBC.     Patient reports increased fatigue over the last several months. She reports no bleeding, however does report dark colored stool (melena) two days ago. Denies any other evidence of melena or hematochezia.     She has previously been diagnosed with iron deficiency (years ago) requiring oral iron supplements, but never received IV iron or bone marrow biopsy.    She and her daughter are unsure of her last colonoscopy and endoscopy history, think it may have occurred >5 years ago but unsure.    She denies recent weight loss, fever, chills,  night sweats.  Previous smoker, quit 5-6 years ago, smoked 3 packs/day X 30 years. Denies alcohol or drug use. Family history significant for son with leukemia, brothers with lung cancer.     Oncology History:  2023: low dose CT chest screenin.9 cm spiculated right upper lobe pulmonary nodule concerning foe malignancy, PET/CT recommended.   2023: EBUS by Dr. Peter negative for malignancy for bronchial washing and FNA of station 10R  2023: PET/CT: Right upper lobe pulmonary nodule, more  posterior possible satellite nodules are postobstructive process measuring 2.1 cm with mSUV 14.5. Also with pretracheal region lymph node 2.6 cm.   6/29/2023: CT guided needle biopsy was non diagnostic, showed fibrosis and chronic inflammation.   8/9/2023: Lab work showed anemia with Hgb 6.5. Referred to ED. Bronchoscopy deferred.   9/6/2023: Navigational bronchoscopy performed by Dr. Clemens- Right upper lobe lung transbronchial biopsy revealed squamous cell carcinoma, lymph node at station 4R also involved with squamous cell carcinoma. PD-L1 TPS 60%. Patient not deemed a surgical candidate.    Ms. Hays presents today with her daughter, grand daughter, and great grand daughters. She is in a wheelchair. She lives alone with her grand son, able to perform her ADLs including cooking, cleaning.     Treatment history:        Interval History 10/11/2023   Ms. Hays presents for follow up today, accompanied by her daughter in law. She started treatment with paclitaxel and carboplatin on 10/9, unfortunately had a anaphylactic reaction to taxol after 8 minutes of infusion causing her to go to the ED. Patient was awake and feeling back to herself at the time of ED visit. Carboplatin was not administered.     She reports doing well overall, no new symptoms. Started radiation therapy yesterday.      Interval History 10/24/2023   Ms. Hays presents for cycle 2 of carboplatin with concurrent radiation. Did well with first cycle. Denies any adverse effects including nausea, vomiting, diarrhea, neuropathy. Appetite is good. Radiation is going well. No complaints today.  Denies any skin rash or bites.    Interval History 11/01/2023  Ms. Hays presents today for cycle 3 of Carboplatin with concurrent XRT. She states that she continues to tolerate treatment well without any adverse effects. Denies nausea, vomiting, diarrhea, neuropathy. Endorses a good appetite. No other specific complaints.    Interval History 11/08/2023  Ms.  Saúl presents today accompanied by her daughter for cycle 4 Carboplatin with XRT. Since her last visit, she states that she has been doing well. Continues to deny treatment related side effects such as nausea, vomiting, diarrhea. Has completed 20/30 planned fractions of radiation. Continues to endorse good appetite. No other specific complaints.     The following portions of the patient's history were reviewed and updated as appropriate: allergies, current medications, past family history, past medical history, past social history, past surgical history and problem list.    Objective     Objective     Allergy:   Allergies   Allergen Reactions    Paclitaxel Anaphylaxis        Current Medications:   Current Outpatient Medications   Medication Sig Dispense Refill    acetaminophen (TYLENOL) 325 MG tablet Take 2 tablets by mouth Every 4 (Four) Hours As Needed for Mild Pain. 30 tablet 0    albuterol sulfate  (90 Base) MCG/ACT inhaler Inhale 2 puffs Every 4 (Four) Hours As Needed for Wheezing or Shortness of Air. 18 g 8    apixaban (ELIQUIS) 5 MG tablet tablet Take 1 tablet by mouth Every 12 (Twelve) Hours. 180 tablet 3    atorvastatin (LIPITOR) 20 MG tablet Take 1 tablet by mouth Daily. 90 tablet 3    Budeson-Glycopyrrol-Formoterol (Breztri Aerosphere) 160-9-4.8 MCG/ACT aerosol inhaler Inhale 2 puffs 2 (Two) Times a Day. 10.7 g 8    esomeprazole (nexIUM) 20 MG capsule Take 1 capsule by mouth Daily.      ibuprofen (ADVIL,MOTRIN) 600 MG tablet Take 1 tablet by mouth Every 6 (Six) Hours As Needed for Mild Pain. 30 tablet 0    ipratropium-albuterol (DUO-NEB) 0.5-2.5 mg/3 ml nebulizer USE 1 VIAL IN NEBULIZER 4 TIMES DAILY - and as needed 360 mL 11    lidocaine-prilocaine (EMLA) 2.5-2.5 % cream Apply to port-a-cath site 30 minutes prior to arrival at infusion center. Cover with plastic wrap. 30 g 1    lisinopril (PRINIVIL,ZESTRIL) 5 MG tablet Take 1 tablet by mouth Daily. 90 tablet 3    loratadine (CLARITIN) 10 MG  tablet Take 1 tablet by mouth Daily As Needed for Allergies.      methocarbamol (Robaxin) 500 MG tablet Take 1 tablet by mouth 3 (Three) Times a Day As Needed for Muscle Spasms. 30 tablet 2    O2 (OXYGEN) Inhale 2 L/min As Needed.      ondansetron (Zofran) 8 MG tablet Take 1 tablet by mouth Every 8 (Eight) Hours As Needed for Nausea or Vomiting. 30 tablet 1    pantoprazole (PROTONIX) 40 MG EC tablet Take 1 tablet by mouth Daily As Needed (acid reflux).      potassium chloride (K-DUR,KLOR-CON) 20 MEQ CR tablet Take 1 tablet by mouth Daily.      prochlorperazine (COMPAZINE) 10 MG tablet Take 1 tablet by mouth Every 6 (Six) Hours As Needed for Nausea or Vomiting. 30 tablet 1    Stool Softener 100 MG capsule Take 1 capsule by mouth 2 (two) times a day.      trimethoprim (TRIMPEX) 100 MG tablet Take 1 tablet by mouth 2 (Two) Times a Day. 30 tablet 1     No current facility-administered medications for this visit.     Facility-Administered Medications Ordered in Other Visits   Medication Dose Route Frequency Provider Last Rate Last Admin    CARBOplatin (PARAPLATIN) 170 mg in sodium chloride 0.9 % 117 mL chemo IVPB  170 mg Intravenous Once Shawna Bond MD        dexAMETHasone (DECADRON) IVPB 12 mg  12 mg Intravenous Once Shawna Bond MD        palonosetron (ALOXI) injection 0.25 mg  0.25 mg Intravenous Once Shawna Bond MD        sodium chloride 0.9 % infusion 250 mL  250 mL Intravenous Once Shawna Bond MD           Past Medical History:  Past Medical History:   Diagnosis Date    Abnormal ECG     Arthritis     CHF (congestive heart failure)     Chronic kidney disease     Collapsed lung     Congenital heart disease     pt unsure of this    COPD (chronic obstructive pulmonary disease)     Coronary artery disease     Diabetes mellitus     TYPE 2    Elevated cholesterol     GERD (gastroesophageal reflux disease)     Heart valve disease     Hyperlipidemia     Hypertension     Myocardial infarction 2019     Primary cancer of right upper lobe of lung 2023    Sleep apnea     non compliant with CPAP    Stroke 2019    UTI (urinary tract infection)     Wears eyeglasses        Past Surgical History:  Past Surgical History:   Procedure Laterality Date    BRONCHOSCOPY Bilateral 2023    Procedure: BRONCHOSCOPY WITH ENDOBRONCHIAL ULTRASOUND;  Surgeon: Abdulkadir Peter MD;  Location:  [facility] OR;  Service: Pulmonary;  Laterality: Bilateral;    BRONCHOSCOPY WITH ION ROBOTIC ASSIST N/A 2023    Procedure: BRONCHOSCOPY WITH ION ROBOT AND EBUS;  Surgeon: John Clemens MD;  Location:  [facility];  Service: Robotics - Pulmonary;  Laterality: N/A;  Ion cath #6 - 0032,  #6 - 0030, cath guide # 0077. EBUS scope removed with balloon intact.    CARDIAC CATHETERIZATION N/A 2017    Procedure: Left Heart Cath;  Surgeon: Jatinder Matias MD;  Location:  [facility] CATH INVASIVE LOCATION;  Service:     CARDIAC CATHETERIZATION N/A 2021    Procedure: Left Heart Cath;  Surgeon: Tolu Steinberg MD;  Location: [facility] CATH INVASIVE LOCATION;  Service: Cardiology;  Laterality: N/A;    COLONOSCOPY      ENDOSCOPY      GALLBLADDER SURGERY      PORTACATH PLACEMENT N/A 2023    Procedure: INSERTION OF PORTACATH;  Surgeon: Petr Velásquez MD;  Location: [facility] OR;  Service: General;  Laterality: N/A;    VENTRAL HERNIA REPAIR N/A 2020    Procedure: VENTRAL HERNIA REPAIR LAPAROSCOPIC WITH DAVINCI ROBOT;  Surgeon: Petr Velásquez MD;  Location: [facility] OR;  Service: DaVinci;  Laterality: N/A;       Social History:  Social History     Socioeconomic History    Marital status: Single    Number of children: 6   Tobacco Use    Smoking status: Former     Packs/day: 3     Types: Cigarettes     Start date: 4/3/1998     Quit date: 2015     Years since quittin.8     Passive exposure: Past    Smokeless tobacco: Never   Vaping Use    Vaping Use: Never used   Substance and Sexual Activity    Alcohol use: No     Drug use: No    Sexual activity: Never         Family History:  Family History   Problem Relation Age of Onset    Heart disease Mother         Rhianna sophia    Heart disease Father     Heart attack Father     Heart failure Father        Review of Systems:  Review of Systems   Constitutional:  Positive for fatigue.   Respiratory:  Positive for cough and shortness of breath.    All other systems reviewed and are negative.      Vital Signs:   /72   Pulse 78   Temp 97.5 °F (36.4 °C) (Temporal)   Resp 18   Wt 102 kg (225 lb 9.6 oz)   SpO2 93%   BMI 31.46 kg/m²      Physical Exam:  Physical Exam  Vitals reviewed.   Constitutional:       General: She is not in acute distress.     Appearance: Normal appearance. She is obese. She is not ill-appearing.      Comments: +hard of hearing   HENT:      Head: Normocephalic and atraumatic.      Mouth/Throat:      Mouth: Mucous membranes are moist.      Pharynx: Oropharynx is clear.   Eyes:      Conjunctiva/sclera: Conjunctivae normal.      Pupils: Pupils are equal, round, and reactive to light.   Cardiovascular:      Rate and Rhythm: Normal rate and regular rhythm.      Heart sounds: No murmur heard.  Pulmonary:      Effort: Pulmonary effort is normal. No respiratory distress.      Breath sounds: Wheezing present.   Abdominal:      General: Abdomen is flat. Bowel sounds are normal. There is no distension.      Palpations: Abdomen is soft. There is no mass.      Tenderness: There is no abdominal tenderness. There is no guarding.   Musculoskeletal:         General: No swelling. Normal range of motion.      Cervical back: Normal range of motion.   Lymphadenopathy:      Cervical: No cervical adenopathy.   Skin:     General: Skin is warm and dry.   Neurological:      General: No focal deficit present.      Mental Status: She is alert and oriented to person, place, and time. Mental status is at baseline.   Psychiatric:         Mood and Affect: Mood normal.         PHQ-9  Score  PHQ-9 Total Score:       Pain Score  Vitals:    11/08/23 1242   BP: 141/72   Pulse: 78   Resp: 18   Temp: 97.5 °F (36.4 °C)   TempSrc: Temporal   SpO2: 93%   Weight: 102 kg (225 lb 9.6 oz)   PainSc: 0-No pain                       PAINSCOREQUALITYMETRIC:   Vitals:    11/08/23 1242   PainSc: 0-No pain              Lab Review  Lab Results   Component Value Date    WBC 5.45 11/07/2023    HGB 10.7 (L) 11/07/2023    HCT 34.9 11/07/2023    MCV 86.4 11/07/2023    RDW 16.2 (H) 11/07/2023     11/07/2023    NEUTRORELPCT 63.8 11/07/2023    LYMPHORELPCT 20.2 11/07/2023    MONORELPCT 9.7 11/07/2023    EOSRELPCT 4.8 11/07/2023    BASORELPCT 0.9 11/07/2023    NEUTROABS 3.48 11/07/2023    LYMPHSABS 1.10 11/07/2023     Lab Results   Component Value Date     11/07/2023    K 4.4 11/07/2023    CO2 27.7 11/07/2023     11/07/2023    BUN 19 11/07/2023    CREATININE 1.02 (H) 11/07/2023    EGFRIFNONA 51 (L) 11/22/2021    GLUCOSE 133 (H) 11/07/2023    CALCIUM 9.6 11/07/2023    ALKPHOS 130 (H) 11/07/2023    AST 14 11/07/2023    ALT 10 11/07/2023    BILITOT <0.2 11/07/2023    ALBUMIN 4.2 11/07/2023    PROTEINTOT 7.4 11/07/2023    MG 2.0 10/09/2023    PHOS 3.5 05/18/2020     Lab Results   Component Value Date    RETICCTPCT 4.26 (H) 08/16/2023     Lab Results   Component Value Date    FERRITIN 119.20 10/11/2023    IRON 30 (L) 10/11/2023    TIBC 352 10/11/2023    LABIRON 9 (L) 10/11/2023    XPYHUHUL22 548 08/14/2023    FOLATE 12.90 08/14/2023        Radiology Results  CT Angiogram Chest Pulmonary Embolism (10/09/2023 16:41)   MPRESSION:  1.  Right upper lobe mass in association with pneumonia has increased in  size and extent when compared to the previous exam with extension to the  pleural surface. Masslike component is approximately 56.8 x 44.5 mm and  was previously 40.1 x 36.7 mm. This would correspond to primary  malignancy.  2.  Central bronchial wall thickening. Can be seen with reactive airway  disease or  bronchitis.  3.  1.3 cm left adrenal nodule is noted. No significant change from  previous.  4.  Increased size of paratracheal and mediastinal lymph nodes. A right  paratracheal lymph node is 2.5 cm and was previously 2.2 cm.  5. No PE identified.  6. Marked cardiomegaly, stable.  7. Otherwise stable chest with other nonacute/incidental findings as  above.    CT Head Without Contrast (10/09/2023 12:00)   IMPRESSION:    Unremarkable exam demonstrating no CT evidence of acute intracranial  findings.      CT Chest Without Contrast Diagnostic (08/09/2023 12:38)       NM PET/CT Skull Base to Mid Thigh (06/20/2023 11:12)         CT Chest Low Dose Cancer Screening WO (02/14/2023 12:58)   IMPRESSION:    Interval development or enlargement of a 1.9 cm spiculated right upper  lobe pulmonary nodule concerning in appearance for malignancy and PET/CT  is recommended for further evaluation.        Pathology:     Tissue Pathology Exam (09/06/2023 08:13)         6/29/23           Assessment / Plan         Assessment and Plan   Mayra aHys is a 78 y.o. year old presents for       Non small cell lung cancer   Cancer Staging   Stage IIIA (cT1c, cN2, cM0)  -Oncology history as above. Patient with 1.9 cm spiculated right upper lobe nodule on low dose CT chest screen (2/2023). EBUS by Dr. Peter negative for malignancy (2/2023). PET/CT with hypermetabolism RUL 2.1 cm and pretracheal region lymph node (6/2023). CT guided biopsy negative for malignancy (6/2023). Navigational bronchoscopy with positive RUL and  4R (paratracheal) for squamous cell (9/6/2023). PD-L1 TPS score 60%.   -Patient not deemed for surgical resection. We reviewed her staging and treatment options which include concurrent chemo-radiation followed by adjuvant immunotherapy. Chemotherapy agents to be used would include weekly carboplatin AUC2 and paclitaxel 50 mg/m2. Chemo will be followed by one year of Durvalumab based on PACIFIC trial.   -Patient experienced  anaphylaxis 8 minutes into the paclitaxel on first cycle 10/9, carboplatin was not administered. Due to this, paclitaxel will be omitted from weekly chemotherapy.  -Radiation therapy started 10/10; 6000 cGy in 30 treatment fractions   -C1 10/18- tolerated well   -C2 10/24- tolerated well  -C3 11/01/23 - tolerated well  - C4 11/08/23 - proceed today    2. Anemia; suspicious for iron deficiency anemia due to GIB  - Patient with normal H/H in our system 2/2023, with acute drop in Hgb 6/29 (10.7) to Hgb (6.5) on 8/9 requiring transfusion. Patient reports one episode of melena two days ago (she is a poor historian).   -Last colonoscopy and endoscopy are unknown; think it may have been >5 years ago  -CBC from 8/14 show Hgb 8.2, Hct 27, MCV 90. Normal WBC and platelet count. Iron studies with normal iron (37), TIBC (435), and low iron saturation (9). This is in light of recent blood transfusion. Normal folate and B12 level. Reticulocyte count noted to be elevated to 6%  -Patient is with fatigue. Denies shortness of breath (aside from baseline COPD) or chest pain. Continues to be on Eliquis for Afib  -Initial work up from consultation confirmed iron deficiency anemia, normal folate/b12 levels. No indication of hemolysis seen with normal LDH and haptoglobin. Myeloma work up has been negative with no M spike on serum protein electrophoresis and normal k/l free light chain ratio. Peripheral smear with normal total WBC without granulocytic dysplasia or blasts.  -Received Ferumoxytol 8/29/2023    3. Malignancy associated pain  - Currently denies        Discussed possible differential diagnoses, testing, treatment, recommended non-pharmacological interventions, risks, warning signs to monitor for that would indicate need for follow-up in clinic or ER. If no improvement with these regimens or you have new or worsening symptoms follow-up. Patient verbalizes understanding and agreement with plan of care. Denies further needs or  concerns.     Patient was given instructions and counseling regarding her condition and for health maintenance advised.       All questions were answered to her satisfaction.              Meds ordered during this visit  No orders of the defined types were placed in this encounter.      Visit Diagnoses    ICD-10-CM ICD-9-CM   1. Non-small cell cancer of right lung  C34.91 162.9   2. Iron deficiency anemia, unspecified iron deficiency anemia type  D50.9 280.9             Follow Up   Follow up in 1 week with ERASTO on day of cycle 5 Carboplatin with treatment labs  Follow up with Dr. Bond on 11/22/23 with repeat iron studies, ferritin as well as treatment labs  Order CT chest for follow up after last cycle, followed by maintenance durvalumab          I spent 30 minutes with Mayra Hays today.  In the office today, more than 50% of this time was spent face-to-face with her  in counseling / coordination of care, reviewing her interim medical history and counseling on the current treatment plan.  All questions were answered to her satisfaction.           This document has been electronically signed by ERASTO Lundberg   November 8, 2023 13:22 EST      Dictated Utilizing Dragon Dictation: Part of this note may be an electronic transcription/translation of spoken language to printed text using the Dragon Dictation System.

## 2023-11-09 ENCOUNTER — HOSPITAL ENCOUNTER (OUTPATIENT)
Dept: RADIATION ONCOLOGY | Facility: HOSPITAL | Age: 78
Discharge: HOME | End: 2023-11-09

## 2023-11-09 LAB
RAD ONC ARIA COURSE ID: NORMAL
RAD ONC ARIA COURSE INTENT: NORMAL
RAD ONC ARIA COURSE LAST TREATMENT DATE: NORMAL
RAD ONC ARIA COURSE START DATE: NORMAL
RAD ONC ARIA COURSE TREATMENT ELAPSED DAYS: 30
RAD ONC ARIA FIRST TREATMENT DATE: NORMAL
RAD ONC ARIA PLAN FRACTIONS TREATED TO DATE: 22
RAD ONC ARIA PLAN ID: NORMAL
RAD ONC ARIA PLAN PRESCRIBED DOSE PER FRACTION: 2 GY
RAD ONC ARIA PLAN PRIMARY REFERENCE POINT: NORMAL
RAD ONC ARIA PLAN TOTAL FRACTIONS PRESCRIBED: 30
RAD ONC ARIA PLAN TOTAL PRESCRIBED DOSE: 6000 CGY
RAD ONC ARIA REFERENCE POINT DOSAGE GIVEN TO DATE: 44 GY
RAD ONC ARIA REFERENCE POINT ID: NORMAL
RAD ONC ARIA REFERENCE POINT SESSION DOSAGE GIVEN: 2 GY

## 2023-11-09 PROCEDURE — 77014 CHG CT GUIDANCE RADIATION THERAPY FLDS PLACEMENT: CPT | Performed by: RADIOLOGY

## 2023-11-09 PROCEDURE — 77386: CPT | Performed by: RADIOLOGY

## 2023-11-10 ENCOUNTER — HOSPITAL ENCOUNTER (OUTPATIENT)
Dept: RADIATION ONCOLOGY | Facility: HOSPITAL | Age: 78
Discharge: HOME | End: 2023-11-10

## 2023-11-10 LAB
RAD ONC ARIA COURSE ID: NORMAL
RAD ONC ARIA COURSE INTENT: NORMAL
RAD ONC ARIA COURSE LAST TREATMENT DATE: NORMAL
RAD ONC ARIA COURSE START DATE: NORMAL
RAD ONC ARIA COURSE TREATMENT ELAPSED DAYS: 31
RAD ONC ARIA FIRST TREATMENT DATE: NORMAL
RAD ONC ARIA PLAN FRACTIONS TREATED TO DATE: 23
RAD ONC ARIA PLAN ID: NORMAL
RAD ONC ARIA PLAN PRESCRIBED DOSE PER FRACTION: 2 GY
RAD ONC ARIA PLAN PRIMARY REFERENCE POINT: NORMAL
RAD ONC ARIA PLAN TOTAL FRACTIONS PRESCRIBED: 30
RAD ONC ARIA PLAN TOTAL PRESCRIBED DOSE: 6000 CGY
RAD ONC ARIA REFERENCE POINT DOSAGE GIVEN TO DATE: 46 GY
RAD ONC ARIA REFERENCE POINT ID: NORMAL
RAD ONC ARIA REFERENCE POINT SESSION DOSAGE GIVEN: 2 GY

## 2023-11-10 PROCEDURE — 77386: CPT | Performed by: RADIOLOGY

## 2023-11-13 ENCOUNTER — HOSPITAL ENCOUNTER (OUTPATIENT)
Dept: RADIATION ONCOLOGY | Facility: HOSPITAL | Age: 78
Discharge: HOME | End: 2023-11-13

## 2023-11-13 LAB
RAD ONC ARIA COURSE ID: NORMAL
RAD ONC ARIA COURSE INTENT: NORMAL
RAD ONC ARIA COURSE LAST TREATMENT DATE: NORMAL
RAD ONC ARIA COURSE START DATE: NORMAL
RAD ONC ARIA COURSE TREATMENT ELAPSED DAYS: 34
RAD ONC ARIA FIRST TREATMENT DATE: NORMAL
RAD ONC ARIA PLAN FRACTIONS TREATED TO DATE: 24
RAD ONC ARIA PLAN ID: NORMAL
RAD ONC ARIA PLAN PRESCRIBED DOSE PER FRACTION: 2 GY
RAD ONC ARIA PLAN PRIMARY REFERENCE POINT: NORMAL
RAD ONC ARIA PLAN TOTAL FRACTIONS PRESCRIBED: 30
RAD ONC ARIA PLAN TOTAL PRESCRIBED DOSE: 6000 CGY
RAD ONC ARIA REFERENCE POINT DOSAGE GIVEN TO DATE: 48 GY
RAD ONC ARIA REFERENCE POINT ID: NORMAL
RAD ONC ARIA REFERENCE POINT SESSION DOSAGE GIVEN: 2 GY

## 2023-11-13 PROCEDURE — 77386: CPT | Performed by: RADIOLOGY

## 2023-11-14 ENCOUNTER — HOSPITAL ENCOUNTER (OUTPATIENT)
Dept: RADIATION ONCOLOGY | Facility: HOSPITAL | Age: 78
Discharge: HOME | End: 2023-11-14

## 2023-11-14 ENCOUNTER — LAB (OUTPATIENT)
Dept: ONCOLOGY | Facility: HOSPITAL | Age: 78
End: 2023-11-14
Payer: MEDICARE

## 2023-11-14 VITALS
OXYGEN SATURATION: 100 % | TEMPERATURE: 98.4 F | SYSTOLIC BLOOD PRESSURE: 135 MMHG | RESPIRATION RATE: 18 BRPM | DIASTOLIC BLOOD PRESSURE: 77 MMHG | HEART RATE: 83 BPM

## 2023-11-14 DIAGNOSIS — C34.91 NON-SMALL CELL CANCER OF RIGHT LUNG: ICD-10-CM

## 2023-11-14 DIAGNOSIS — C34.11 PRIMARY CANCER OF RIGHT UPPER LOBE OF LUNG: ICD-10-CM

## 2023-11-14 LAB
ALBUMIN SERPL-MCNC: 4.2 G/DL (ref 3.5–5.2)
ALBUMIN/GLOB SERPL: 1.4 G/DL
ALP SERPL-CCNC: 145 U/L (ref 39–117)
ALT SERPL W P-5'-P-CCNC: 10 U/L (ref 1–33)
ANION GAP SERPL CALCULATED.3IONS-SCNC: 7.6 MMOL/L (ref 5–15)
AST SERPL-CCNC: 14 U/L (ref 1–32)
BASOPHILS # BLD AUTO: 0.04 10*3/MM3 (ref 0–0.2)
BASOPHILS NFR BLD AUTO: 0.8 % (ref 0–1.5)
BILIRUB SERPL-MCNC: <0.2 MG/DL (ref 0–1.2)
BUN SERPL-MCNC: 19 MG/DL (ref 8–23)
BUN/CREAT SERPL: 15.8 (ref 7–25)
CALCIUM SPEC-SCNC: 9.3 MG/DL (ref 8.6–10.5)
CHLORIDE SERPL-SCNC: 101 MMOL/L (ref 98–107)
CO2 SERPL-SCNC: 27.4 MMOL/L (ref 22–29)
CREAT SERPL-MCNC: 1.2 MG/DL (ref 0.57–1)
DEPRECATED RDW RBC AUTO: 51.1 FL (ref 37–54)
EGFRCR SERPLBLD CKD-EPI 2021: 46.7 ML/MIN/1.73
EOSINOPHIL # BLD AUTO: 0.16 10*3/MM3 (ref 0–0.4)
EOSINOPHIL NFR BLD AUTO: 3.4 % (ref 0.3–6.2)
ERYTHROCYTE [DISTWIDTH] IN BLOOD BY AUTOMATED COUNT: 16.5 % (ref 12.3–15.4)
GLOBULIN UR ELPH-MCNC: 2.9 GM/DL
GLUCOSE SERPL-MCNC: 295 MG/DL (ref 65–99)
HCT VFR BLD AUTO: 35.8 % (ref 34–46.6)
HGB BLD-MCNC: 10.8 G/DL (ref 12–15.9)
IMM GRANULOCYTES # BLD AUTO: 0.02 10*3/MM3 (ref 0–0.05)
IMM GRANULOCYTES NFR BLD AUTO: 0.4 % (ref 0–0.5)
LYMPHOCYTES # BLD AUTO: 0.71 10*3/MM3 (ref 0.7–3.1)
LYMPHOCYTES NFR BLD AUTO: 15 % (ref 19.6–45.3)
MCH RBC QN AUTO: 26 PG (ref 26.6–33)
MCHC RBC AUTO-ENTMCNC: 30.2 G/DL (ref 31.5–35.7)
MCV RBC AUTO: 86.3 FL (ref 79–97)
MONOCYTES # BLD AUTO: 0.33 10*3/MM3 (ref 0.1–0.9)
MONOCYTES NFR BLD AUTO: 7 % (ref 5–12)
NEUTROPHILS NFR BLD AUTO: 3.48 10*3/MM3 (ref 1.7–7)
NEUTROPHILS NFR BLD AUTO: 73.4 % (ref 42.7–76)
NRBC BLD AUTO-RTO: 0 /100 WBC (ref 0–0.2)
PLATELET # BLD AUTO: 157 10*3/MM3 (ref 140–450)
PMV BLD AUTO: 11.2 FL (ref 6–12)
POTASSIUM SERPL-SCNC: 4.6 MMOL/L (ref 3.5–5.2)
PROT SERPL-MCNC: 7.1 G/DL (ref 6–8.5)
RAD ONC ARIA COURSE ID: NORMAL
RAD ONC ARIA COURSE INTENT: NORMAL
RAD ONC ARIA COURSE LAST TREATMENT DATE: NORMAL
RAD ONC ARIA COURSE START DATE: NORMAL
RAD ONC ARIA COURSE TREATMENT ELAPSED DAYS: 35
RAD ONC ARIA FIRST TREATMENT DATE: NORMAL
RAD ONC ARIA PLAN FRACTIONS TREATED TO DATE: 25
RAD ONC ARIA PLAN ID: NORMAL
RAD ONC ARIA PLAN PRESCRIBED DOSE PER FRACTION: 2 GY
RAD ONC ARIA PLAN PRIMARY REFERENCE POINT: NORMAL
RAD ONC ARIA PLAN TOTAL FRACTIONS PRESCRIBED: 30
RAD ONC ARIA PLAN TOTAL PRESCRIBED DOSE: 6000 CGY
RAD ONC ARIA REFERENCE POINT DOSAGE GIVEN TO DATE: 50 GY
RAD ONC ARIA REFERENCE POINT ID: NORMAL
RAD ONC ARIA REFERENCE POINT SESSION DOSAGE GIVEN: 2 GY
RBC # BLD AUTO: 4.15 10*6/MM3 (ref 3.77–5.28)
SODIUM SERPL-SCNC: 136 MMOL/L (ref 136–145)
WBC NRBC COR # BLD: 4.74 10*3/MM3 (ref 3.4–10.8)

## 2023-11-14 PROCEDURE — 77386: CPT | Performed by: RADIOLOGY

## 2023-11-14 PROCEDURE — 80053 COMPREHEN METABOLIC PANEL: CPT | Performed by: INTERNAL MEDICINE

## 2023-11-14 PROCEDURE — 85025 COMPLETE CBC W/AUTO DIFF WBC: CPT | Performed by: INTERNAL MEDICINE

## 2023-11-14 NOTE — PROGRESS NOTES
On Treatment Visit Note      Patient Name: Mayra Hays  : 1945   MRN: [id number]     Diagnosis:    Chief Complaint   Patient presents with    Lung Cancer     Non-Small Cell Lung Cancer, Right       Staging: Primary cancer of right upper lobe of lung with metastases to the pretracheal lymph nodes  - Stage IIIA (cT1c, cN2, cM0)     This patient was seen today for an on treatment visit.  To date, the patient has received 5000 cGy of a 6000 cGy regimen.  The patient is also receiving concurrent carboplatin and Taxol chemotherapy    Radiation Treatments       Active   Plans   RUL   Most recent treatment: Dose planned: 200 cGy (fraction 25 on 2023)   Total: Dose planned: 6,000 cGy (30 fractions)   Elapsed Days: 35      Reference Points   PTV   Most recent treatment: Dose given: 200 cGy (on 2023)   Total: Dose given: 5,000 cGy   Elapsed Days: 35           Historical   No historical radiation treatments to show.              Subjective        Treatment Tolerance:  Mrs. Hays is tolerating treatment well.  She denies dysphagia and odynophagia.  She reports some lessening of shortness of breath.  Patient states that her appetite and energy levels are good.    Medications:     Current Outpatient Medications:     acetaminophen (TYLENOL) 325 MG tablet, Take 2 tablets by mouth Every 4 (Four) Hours As Needed for Mild Pain., Disp: 30 tablet, Rfl: 0    albuterol sulfate  (90 Base) MCG/ACT inhaler, Inhale 2 puffs Every 4 (Four) Hours As Needed for Wheezing or Shortness of Air., Disp: 18 g, Rfl: 8    apixaban (ELIQUIS) 5 MG tablet tablet, Take 1 tablet by mouth Every 12 (Twelve) Hours., Disp: 180 tablet, Rfl: 3    atorvastatin (LIPITOR) 20 MG tablet, Take 1 tablet by mouth Daily., Disp: 90 tablet, Rfl: 3    Budeson-Glycopyrrol-Formoterol (Breztri Aerosphere) 160-9-4.8 MCG/ACT aerosol inhaler, Inhale 2 puffs 2 (Two) Times a Day., Disp: 10.7 g, Rfl: 8    esomeprazole (nexIUM) 20 MG capsule, Take 1  capsule by mouth Daily., Disp: , Rfl:     ibuprofen (ADVIL,MOTRIN) 600 MG tablet, Take 1 tablet by mouth Every 6 (Six) Hours As Needed for Mild Pain., Disp: 30 tablet, Rfl: 0    ipratropium-albuterol (DUO-NEB) 0.5-2.5 mg/3 ml nebulizer, USE 1 VIAL IN NEBULIZER 4 TIMES DAILY - and as needed, Disp: 360 mL, Rfl: 11    lidocaine-prilocaine (EMLA) 2.5-2.5 % cream, Apply to port-a-cath site 30 minutes prior to arrival at infusion center. Cover with plastic wrap., Disp: 30 g, Rfl: 1    lisinopril (PRINIVIL,ZESTRIL) 5 MG tablet, Take 1 tablet by mouth Daily., Disp: 90 tablet, Rfl: 3    loratadine (CLARITIN) 10 MG tablet, Take 1 tablet by mouth Daily As Needed for Allergies., Disp: , Rfl:     methocarbamol (Robaxin) 500 MG tablet, Take 1 tablet by mouth 3 (Three) Times a Day As Needed for Muscle Spasms., Disp: 30 tablet, Rfl: 2    O2 (OXYGEN), Inhale 2 L/min As Needed., Disp: , Rfl:     ondansetron (Zofran) 8 MG tablet, Take 1 tablet by mouth Every 8 (Eight) Hours As Needed for Nausea or Vomiting., Disp: 30 tablet, Rfl: 1    pantoprazole (PROTONIX) 40 MG EC tablet, Take 1 tablet by mouth Daily As Needed (acid reflux)., Disp: , Rfl:     potassium chloride (K-DUR,KLOR-CON) 20 MEQ CR tablet, Take 1 tablet by mouth Daily., Disp: , Rfl:     prochlorperazine (COMPAZINE) 10 MG tablet, Take 1 tablet by mouth Every 6 (Six) Hours As Needed for Nausea or Vomiting., Disp: 30 tablet, Rfl: 1    Stool Softener 100 MG capsule, Take 1 capsule by mouth 2 (two) times a day., Disp: , Rfl:     trimethoprim (TRIMPEX) 100 MG tablet, Take 1 tablet by mouth 2 (Two) Times a Day., Disp: 30 tablet, Rfl: 1    Allergies:   Allergies   Allergen Reactions    Paclitaxel Anaphylaxis     Objective     Physical Exam:  The patient is an elderly white female in no acute distress.  Vital signs as below.  KPS 90.    Vital Signs:   Vitals:    11/14/23 1419   BP: 135/77   Pulse: 83   Resp: 18   Temp: 98.4 °F (36.9 °C)   TempSrc: Temporal   SpO2: 100%   PainSc: 0-No  pain     There is no height or weight on file to calculate BMI.     Neck: Short, supple no detectable cervical or periclavicular adenopathy  Heart: Regular rate and rhythm  Lungs: Clear to auscultation bilaterally  Lymphatics: No axillary adenopathy  Chest: No radiation changes noted over anterior posterior chest wall  Extremities: No cyanosis, clubbing or edema  Neurologic: cranial nerves II through XII intact no motor, sensory, or cerebellar deficit.    Current Total XRT Dose (cGY):    Radiation Treatments       Active   Plans   RUL   Most recent treatment: Dose planned: 200 cGy (fraction 25 on 11/14/2023)   Total: Dose planned: 6,000 cGy (30 fractions)   Elapsed Days: 35      Reference Points   PTV   Most recent treatment: Dose given: 200 cGy (on 11/14/2023)   Total: Dose given: 5,000 cGy   Elapsed Days: 35           Historical   No historical radiation treatments to show.              Plan      Plan:   Patient was seen today for an on treatment visit. Patient is receiving radiation therapy to the right upper lung and involved lymph nodes.. Patient is stable and tolerating radiation therapy well with minimal side effects. Continue with radiation therapy.  The patient has 5 treatment fractions pending.    I discussed post treatment surveillance measures with the patient.  I reminded the patient that she may receive maintenance immunotherapy.    I have reviewed treatment setup notes, checked and approved the daily guidance images. I reviewed dose delivery, treatment parameters and deemed them appropriate. We plan to continue radiation therapy as prescribed.     Digital speech recognition software was used to dictate parts of this note, some dictation errors may occur.    Nima Smith MD   11/14/23 14:26 EST

## 2023-11-15 ENCOUNTER — OFFICE VISIT (OUTPATIENT)
Dept: ONCOLOGY | Facility: CLINIC | Age: 78
End: 2023-11-15
Payer: MEDICARE

## 2023-11-15 ENCOUNTER — HOSPITAL ENCOUNTER (OUTPATIENT)
Dept: RADIATION ONCOLOGY | Facility: HOSPITAL | Age: 78
Discharge: HOME | End: 2023-11-15

## 2023-11-15 ENCOUNTER — INFUSION (OUTPATIENT)
Dept: ONCOLOGY | Facility: HOSPITAL | Age: 78
End: 2023-11-15
Payer: MEDICARE

## 2023-11-15 VITALS
RESPIRATION RATE: 18 BRPM | SYSTOLIC BLOOD PRESSURE: 162 MMHG | HEART RATE: 76 BPM | DIASTOLIC BLOOD PRESSURE: 79 MMHG | TEMPERATURE: 97.7 F | OXYGEN SATURATION: 94 %

## 2023-11-15 VITALS
OXYGEN SATURATION: 94 % | RESPIRATION RATE: 18 BRPM | SYSTOLIC BLOOD PRESSURE: 162 MMHG | DIASTOLIC BLOOD PRESSURE: 79 MMHG | TEMPERATURE: 97.7 F | HEART RATE: 76 BPM

## 2023-11-15 DIAGNOSIS — C34.91 NON-SMALL CELL CANCER OF RIGHT LUNG: ICD-10-CM

## 2023-11-15 DIAGNOSIS — Z95.828 PORT-A-CATH IN PLACE: ICD-10-CM

## 2023-11-15 DIAGNOSIS — R79.89 ELEVATED SERUM CREATININE: Primary | ICD-10-CM

## 2023-11-15 DIAGNOSIS — K90.49 MALABSORPTION DUE TO INTOLERANCE, NOT ELSEWHERE CLASSIFIED: ICD-10-CM

## 2023-11-15 DIAGNOSIS — C34.11 PRIMARY CANCER OF RIGHT UPPER LOBE OF LUNG: ICD-10-CM

## 2023-11-15 DIAGNOSIS — C34.91 NON-SMALL CELL CANCER OF RIGHT LUNG: Primary | ICD-10-CM

## 2023-11-15 DIAGNOSIS — D50.9 IRON DEFICIENCY ANEMIA, UNSPECIFIED IRON DEFICIENCY ANEMIA TYPE: ICD-10-CM

## 2023-11-15 LAB
RAD ONC ARIA COURSE ID: NORMAL
RAD ONC ARIA COURSE INTENT: NORMAL
RAD ONC ARIA COURSE LAST TREATMENT DATE: NORMAL
RAD ONC ARIA COURSE START DATE: NORMAL
RAD ONC ARIA COURSE TREATMENT ELAPSED DAYS: 36
RAD ONC ARIA FIRST TREATMENT DATE: NORMAL
RAD ONC ARIA PLAN FRACTIONS TREATED TO DATE: 26
RAD ONC ARIA PLAN ID: NORMAL
RAD ONC ARIA PLAN PRESCRIBED DOSE PER FRACTION: 2 GY
RAD ONC ARIA PLAN PRIMARY REFERENCE POINT: NORMAL
RAD ONC ARIA PLAN TOTAL FRACTIONS PRESCRIBED: 30
RAD ONC ARIA PLAN TOTAL PRESCRIBED DOSE: 6000 CGY
RAD ONC ARIA REFERENCE POINT DOSAGE GIVEN TO DATE: 52 GY
RAD ONC ARIA REFERENCE POINT ID: NORMAL
RAD ONC ARIA REFERENCE POINT SESSION DOSAGE GIVEN: 2 GY

## 2023-11-15 PROCEDURE — 96375 TX/PRO/DX INJ NEW DRUG ADDON: CPT

## 2023-11-15 PROCEDURE — 1126F AMNT PAIN NOTED NONE PRSNT: CPT | Performed by: NURSE PRACTITIONER

## 2023-11-15 PROCEDURE — 96413 CHEMO IV INFUSION 1 HR: CPT

## 2023-11-15 PROCEDURE — 25010000002 PALONOSETRON 0.25 MG/5ML SOLUTION PREFILLED SYRINGE: Performed by: INTERNAL MEDICINE

## 2023-11-15 PROCEDURE — 77336 RADIATION PHYSICS CONSULT: CPT | Performed by: RADIOLOGY

## 2023-11-15 PROCEDURE — 3077F SYST BP >= 140 MM HG: CPT | Performed by: NURSE PRACTITIONER

## 2023-11-15 PROCEDURE — 1159F MED LIST DOCD IN RCRD: CPT | Performed by: NURSE PRACTITIONER

## 2023-11-15 PROCEDURE — 77386: CPT | Performed by: RADIOLOGY

## 2023-11-15 PROCEDURE — 1160F RVW MEDS BY RX/DR IN RCRD: CPT | Performed by: NURSE PRACTITIONER

## 2023-11-15 PROCEDURE — 25010000002 HEPARIN LOCK FLUSH PER 10 UNITS: Performed by: NURSE PRACTITIONER

## 2023-11-15 PROCEDURE — 25010000002 CARBOPLATIN PER 50 MG: Performed by: INTERNAL MEDICINE

## 2023-11-15 PROCEDURE — 25810000003 SODIUM CHLORIDE 0.9 % SOLUTION: Performed by: NURSE PRACTITIONER

## 2023-11-15 PROCEDURE — 25010000002 DEXAMETHASONE SODIUM PHOSPHATE 20 MG/5ML SOLUTION: Performed by: INTERNAL MEDICINE

## 2023-11-15 PROCEDURE — 99214 OFFICE O/P EST MOD 30 MIN: CPT | Performed by: NURSE PRACTITIONER

## 2023-11-15 PROCEDURE — 3078F DIAST BP <80 MM HG: CPT | Performed by: NURSE PRACTITIONER

## 2023-11-15 RX ORDER — HEPARIN SODIUM (PORCINE) LOCK FLUSH IV SOLN 100 UNIT/ML 100 UNIT/ML
500 SOLUTION INTRAVENOUS AS NEEDED
OUTPATIENT
Start: 2023-11-20

## 2023-11-15 RX ORDER — HEPARIN SODIUM (PORCINE) LOCK FLUSH IV SOLN 100 UNIT/ML 100 UNIT/ML
300 SOLUTION INTRAVENOUS ONCE
OUTPATIENT
Start: 2023-11-15

## 2023-11-15 RX ORDER — SODIUM CHLORIDE 9 MG/ML
500 INJECTION, SOLUTION INTRAVENOUS ONCE
Status: COMPLETED | OUTPATIENT
Start: 2023-11-15 | End: 2023-11-15

## 2023-11-15 RX ORDER — SODIUM CHLORIDE 0.9 % (FLUSH) 0.9 %
10 SYRINGE (ML) INJECTION AS NEEDED
OUTPATIENT
Start: 2023-11-20

## 2023-11-15 RX ORDER — SODIUM CHLORIDE 0.9 % (FLUSH) 0.9 %
10 SYRINGE (ML) INJECTION AS NEEDED
Status: DISCONTINUED | OUTPATIENT
Start: 2023-11-15 | End: 2023-11-15 | Stop reason: HOSPADM

## 2023-11-15 RX ORDER — SODIUM CHLORIDE 9 MG/ML
250 INJECTION, SOLUTION INTRAVENOUS ONCE
Status: DISCONTINUED | OUTPATIENT
Start: 2023-11-15 | End: 2023-11-15 | Stop reason: HOSPADM

## 2023-11-15 RX ORDER — HEPARIN SODIUM (PORCINE) LOCK FLUSH IV SOLN 100 UNIT/ML 100 UNIT/ML
500 SOLUTION INTRAVENOUS AS NEEDED
Status: DISCONTINUED | OUTPATIENT
Start: 2023-11-15 | End: 2023-11-15 | Stop reason: HOSPADM

## 2023-11-15 RX ORDER — SODIUM CHLORIDE 0.9 % (FLUSH) 0.9 %
20 SYRINGE (ML) INJECTION AS NEEDED
OUTPATIENT
Start: 2023-11-15

## 2023-11-15 RX ORDER — PALONOSETRON 0.05 MG/ML
0.25 INJECTION, SOLUTION INTRAVENOUS ONCE
Status: COMPLETED | OUTPATIENT
Start: 2023-11-15 | End: 2023-11-15

## 2023-11-15 RX ADMIN — PALONOSETRON 0.25 MG: 0.25 INJECTION, SOLUTION INTRAVENOUS at 13:44

## 2023-11-15 RX ADMIN — SODIUM CHLORIDE 500 ML: 9 INJECTION, SOLUTION INTRAVENOUS at 13:44

## 2023-11-15 RX ADMIN — DEXAMETHASONE SODIUM PHOSPHATE 12 MG: 4 INJECTION, SOLUTION INTRA-ARTICULAR; INTRALESIONAL; INTRAMUSCULAR; INTRAVENOUS; SOFT TISSUE at 13:45

## 2023-11-15 RX ADMIN — Medication 10 ML: at 14:46

## 2023-11-15 RX ADMIN — Medication 500 UNITS: at 14:46

## 2023-11-15 RX ADMIN — CARBOPLATIN 160 MG: 10 INJECTION, SOLUTION INTRAVENOUS at 14:05

## 2023-11-15 NOTE — PROGRESS NOTES
Subjective     Date: 2023    Referring Provider  No ref. provider found    Chief Complaint  Symptomatic anemia   2. Non small Cell Lung Cancer  Cancer Staging   Stage IIIA (cT1c, cN2, cM0)        Subjective      Mayra Hays is a 78 y.o. female who presents today to [facility] for follow up.    HPI:   78 y.o. female with past medical history of diabetes mellitus type 2, hyperlipidemia, COPD, hypertension, atrial fibrillation on anticoagulation (Eliquis), h/o CVA and previous tobacco use presents for symptomatic anemia and NSCLC.    She is present today with her daughter, Hailey, who is assisting with history. Pt started work up for RUL lung nodule , since then noticed to have a decrease in her Hgb to 10.7 from normal in 2023. More recently, her Hgb 8/9 was 6.5, she was directed to ED where she received 1U PRBC.     Patient reports increased fatigue over the last several months. She reports no bleeding, however does report dark colored stool (melena) two days ago. Denies any other evidence of melena or hematochezia.     She has previously been diagnosed with iron deficiency (years ago) requiring oral iron supplements, but never received IV iron or bone marrow biopsy.    She and her daughter are unsure of her last colonoscopy and endoscopy history, think it may have occurred >5 years ago but unsure.    She denies recent weight loss, fever, chills,  night sweats.  Previous smoker, quit 5-6 years ago, smoked 3 packs/day X 30 years. Denies alcohol or drug use. Family history significant for son with leukemia, brothers with lung cancer.     Oncology History:  2023: low dose CT chest screenin.9 cm spiculated right upper lobe pulmonary nodule concerning foe malignancy, PET/CT recommended.   2023: EBUS by Dr. Peter negative for malignancy for bronchial washing and FNA of station 10R  2023: PET/CT: Right upper lobe pulmonary nodule, more  posterior possible satellite nodules are postobstructive process measuring 2.1 cm with mSUV 14.5. Also with pretracheal region lymph node 2.6 cm.   6/29/2023: CT guided needle biopsy was non diagnostic, showed fibrosis and chronic inflammation.   8/9/2023: Lab work showed anemia with Hgb 6.5. Referred to ED. Bronchoscopy deferred.   9/6/2023: Navigational bronchoscopy performed by Dr. Clemens- Right upper lobe lung transbronchial biopsy revealed squamous cell carcinoma, lymph node at station 4R also involved with squamous cell carcinoma. PD-L1 TPS 60%. Patient not deemed a surgical candidate.    Ms. Hays presents today with her daughter, grand daughter, and great grand daughters. She is in a wheelchair. She lives alone with her grand son, able to perform her ADLs including cooking, cleaning.     Treatment history:        Interval History 10/11/2023   Ms. Hays presents for follow up today, accompanied by her daughter in law. She started treatment with paclitaxel and carboplatin on 10/9, unfortunately had a anaphylactic reaction to taxol after 8 minutes of infusion causing her to go to the ED. Patient was awake and feeling back to herself at the time of ED visit. Carboplatin was not administered.     She reports doing well overall, no new symptoms. Started radiation therapy yesterday.      Interval History 10/24/2023   Ms. Hays presents for cycle 2 of carboplatin with concurrent radiation. Did well with first cycle. Denies any adverse effects including nausea, vomiting, diarrhea, neuropathy. Appetite is good. Radiation is going well. No complaints today.  Denies any skin rash or bites.    Interval History 11/01/2023  Ms. Hays presents today for cycle 3 of Carboplatin with concurrent XRT. She states that she continues to tolerate treatment well without any adverse effects. Denies nausea, vomiting, diarrhea, neuropathy. Endorses a good appetite. No other specific complaints.    Interval History 11/08/2023  Ms.  Saúl presents today accompanied by her daughter for cycle 4 Carboplatin with XRT. Since her last visit, she states that she has been doing well. Continues to deny treatment related side effects such as nausea, vomiting, diarrhea. Has completed 20/30 planned fractions of radiation. Continues to endorse good appetite. No other specific complaints.     Interval History 11/15/2023  Ms. Hays presents today for cycle 5 carboplatin with concurrent XRT. She has completed 25/30 planned fractions of XRT. Overall, she reports that she has been tolerating the treatments well without any noticeable side effects. She denies nausea/vomiting or constipation/diarrhea. She reports a good appetite. She reports mild numbness/tingling in bilateral hands/feet that comes and goes. Otherwise, she is doing well and denies any specific complaints today.    The following portions of the patient's history were reviewed and updated as appropriate: allergies, current medications, past family history, past medical history, past social history, past surgical history and problem list.    Objective     Objective     Allergy:   Allergies   Allergen Reactions    Paclitaxel Anaphylaxis        Current Medications:   Current Outpatient Medications   Medication Sig Dispense Refill    acetaminophen (TYLENOL) 325 MG tablet Take 2 tablets by mouth Every 4 (Four) Hours As Needed for Mild Pain. 30 tablet 0    albuterol sulfate  (90 Base) MCG/ACT inhaler Inhale 2 puffs Every 4 (Four) Hours As Needed for Wheezing or Shortness of Air. 18 g 8    apixaban (ELIQUIS) 5 MG tablet tablet Take 1 tablet by mouth Every 12 (Twelve) Hours. 180 tablet 3    atorvastatin (LIPITOR) 20 MG tablet Take 1 tablet by mouth Daily. 90 tablet 3    Budeson-Glycopyrrol-Formoterol (Breztri Aerosphere) 160-9-4.8 MCG/ACT aerosol inhaler Inhale 2 puffs 2 (Two) Times a Day. 10.7 g 8    esomeprazole (nexIUM) 20 MG capsule Take 1 capsule by mouth Daily.      ibuprofen (ADVIL,MOTRIN) 600  MG tablet Take 1 tablet by mouth Every 6 (Six) Hours As Needed for Mild Pain. 30 tablet 0    ipratropium-albuterol (DUO-NEB) 0.5-2.5 mg/3 ml nebulizer USE 1 VIAL IN NEBULIZER 4 TIMES DAILY - and as needed 360 mL 11    lidocaine-prilocaine (EMLA) 2.5-2.5 % cream Apply to port-a-cath site 30 minutes prior to arrival at infusion center. Cover with plastic wrap. 30 g 1    lisinopril (PRINIVIL,ZESTRIL) 5 MG tablet Take 1 tablet by mouth Daily. 90 tablet 3    loratadine (CLARITIN) 10 MG tablet Take 1 tablet by mouth Daily As Needed for Allergies.      methocarbamol (Robaxin) 500 MG tablet Take 1 tablet by mouth 3 (Three) Times a Day As Needed for Muscle Spasms. 30 tablet 2    O2 (OXYGEN) Inhale 2 L/min As Needed.      ondansetron (Zofran) 8 MG tablet Take 1 tablet by mouth Every 8 (Eight) Hours As Needed for Nausea or Vomiting. 30 tablet 1    pantoprazole (PROTONIX) 40 MG EC tablet Take 1 tablet by mouth Daily As Needed (acid reflux).      potassium chloride (K-DUR,KLOR-CON) 20 MEQ CR tablet Take 1 tablet by mouth Daily.      prochlorperazine (COMPAZINE) 10 MG tablet Take 1 tablet by mouth Every 6 (Six) Hours As Needed for Nausea or Vomiting. 30 tablet 1    Stool Softener 100 MG capsule Take 1 capsule by mouth 2 (two) times a day.      trimethoprim (TRIMPEX) 100 MG tablet Take 1 tablet by mouth 2 (Two) Times a Day. 30 tablet 1     No current facility-administered medications for this visit.       Past Medical History:  Past Medical History:   Diagnosis Date    Abnormal ECG     Arthritis     CHF (congestive heart failure)     Chronic kidney disease     Collapsed lung     Congenital heart disease     pt unsure of this    COPD (chronic obstructive pulmonary disease)     Coronary artery disease     Diabetes mellitus     TYPE 2    Elevated cholesterol     GERD (gastroesophageal reflux disease)     Heart valve disease     Hyperlipidemia     Hypertension     Myocardial infarction 2019    Primary cancer of right upper lobe of  lung 2023    Sleep apnea     non compliant with CPAP    Stroke     UTI (urinary tract infection)     Wears eyeglasses        Past Surgical History:  Past Surgical History:   Procedure Laterality Date    BRONCHOSCOPY Bilateral 2023    Procedure: BRONCHOSCOPY WITH ENDOBRONCHIAL ULTRASOUND;  Surgeon: Abdulkadir Peter MD;  Location: [facility] OR;  Service: Pulmonary;  Laterality: Bilateral;    BRONCHOSCOPY WITH ION ROBOTIC ASSIST N/A 2023    Procedure: BRONCHOSCOPY WITH ION ROBOT AND EBUS;  Surgeon: John Clemens MD;  Location:  [facility];  Service: Robotics - Pulmonary;  Laterality: N/A;  Ion cath #6 - 0032,  #6 - 0030, cath guide # 0077. EBUS scope removed with balloon intact.    CARDIAC CATHETERIZATION N/A 2017    Procedure: Left Heart Cath;  Surgeon: Jatinder Matias MD;  Location:  [facility];  Service:     CARDIAC CATHETERIZATION N/A 2021    Procedure: Left Heart Cath;  Surgeon: Tolu Steinberg MD;  Location: [facility] CATH INVASIVE LOCATION;  Service: Cardiology;  Laterality: N/A;    COLONOSCOPY      ENDOSCOPY      GALLBLADDER SURGERY      PORTACATH PLACEMENT N/A 2023    Procedure: INSERTION OF PORTACATH;  Surgeon: Petr Velásquez MD;  Location: [facility] OR;  Service: General;  Laterality: N/A;    VENTRAL HERNIA REPAIR N/A 2020    Procedure: VENTRAL HERNIA REPAIR LAPAROSCOPIC WITH DAVINCI ROBOT;  Surgeon: Petr Velásquez MD;  Location: [facility] OR;  Service: DaVinci;  Laterality: N/A;       Social History:  Social History     Socioeconomic History    Marital status: Single    Number of children: 6   Tobacco Use    Smoking status: Former     Packs/day: 3     Types: Cigarettes     Start date: 4/3/1998     Quit date: 2015     Years since quittin.8     Passive exposure: Past    Smokeless tobacco: Never   Vaping Use    Vaping Use: Never used   Substance and Sexual Activity    Alcohol use: No    Drug use: No    Sexual activity: Never          Family History:  Family History   Problem Relation Age of Onset    Heart disease Mother         Rhianna sophia    Heart disease Father     Heart attack Father     Heart failure Father        Review of Systems:  Review of Systems   Constitutional:  Positive for fatigue.   Respiratory:  Negative for cough and shortness of breath.    Neurological:  Positive for numbness.        Intermittent numbness/tingling bilateral hands/feet.   All other systems reviewed and are negative.      Vital Signs:   /79   Pulse 76   Temp 97.7 °F (36.5 °C) (Temporal)   Resp 18   SpO2 94%      Physical Exam:  Physical Exam  Vitals reviewed.   Constitutional:       General: She is not in acute distress.     Appearance: Normal appearance. She is obese. She is not ill-appearing.      Comments: +hard of hearing   HENT:      Head: Normocephalic and atraumatic.      Mouth/Throat:      Mouth: Mucous membranes are moist.      Pharynx: Oropharynx is clear.   Eyes:      Conjunctiva/sclera: Conjunctivae normal.      Pupils: Pupils are equal, round, and reactive to light.   Cardiovascular:      Rate and Rhythm: Normal rate and regular rhythm.      Heart sounds: No murmur heard.  Pulmonary:      Effort: Pulmonary effort is normal. No respiratory distress.      Breath sounds: No wheezing.   Abdominal:      General: Abdomen is flat. Bowel sounds are normal. There is no distension.      Palpations: Abdomen is soft. There is no mass.      Tenderness: There is no abdominal tenderness. There is no guarding.   Musculoskeletal:         General: No swelling. Normal range of motion.      Cervical back: Normal range of motion.   Lymphadenopathy:      Cervical: No cervical adenopathy.   Skin:     General: Skin is warm and dry.   Neurological:      General: No focal deficit present.      Mental Status: She is alert and oriented to person, place, and time. Mental status is at baseline.   Psychiatric:         Mood and Affect: Mood normal.         PHQ-9  Score  PHQ-9 Total Score:       Pain Score  Vitals:    11/15/23 1300   BP: 162/79   Pulse: 76   Resp: 18   Temp: 97.7 °F (36.5 °C)   TempSrc: Temporal   SpO2: 94%   PainSc: 0-No pain                       PAINSCOREQUALITYMETRIC:   Vitals:    11/15/23 1300   PainSc: 0-No pain              Lab Review  Lab Results   Component Value Date    WBC 4.74 11/14/2023    HGB 10.8 (L) 11/14/2023    HCT 35.8 11/14/2023    MCV 86.3 11/14/2023    RDW 16.5 (H) 11/14/2023     11/14/2023    NEUTRORELPCT 73.4 11/14/2023    LYMPHORELPCT 15.0 (L) 11/14/2023    MONORELPCT 7.0 11/14/2023    EOSRELPCT 3.4 11/14/2023    BASORELPCT 0.8 11/14/2023    NEUTROABS 3.48 11/14/2023    LYMPHSABS 0.71 11/14/2023     Lab Results   Component Value Date     11/14/2023    K 4.6 11/14/2023    CO2 27.4 11/14/2023     11/14/2023    BUN 19 11/14/2023    CREATININE 1.20 (H) 11/14/2023    EGFRIFNONA 51 (L) 11/22/2021    GLUCOSE 295 (H) 11/14/2023    CALCIUM 9.3 11/14/2023    ALKPHOS 145 (H) 11/14/2023    AST 14 11/14/2023    ALT 10 11/14/2023    BILITOT <0.2 11/14/2023    ALBUMIN 4.2 11/14/2023    PROTEINTOT 7.1 11/14/2023    MG 2.0 10/09/2023    PHOS 3.5 05/18/2020     Lab Results   Component Value Date    RETICCTPCT 4.26 (H) 08/16/2023     Lab Results   Component Value Date    FERRITIN 119.20 10/11/2023    IRON 30 (L) 10/11/2023    TIBC 352 10/11/2023    LABIRON 9 (L) 10/11/2023    [id number] 548 08/14/2023    FOLATE 12.90 08/14/2023        Radiology Results  CT Angiogram Chest Pulmonary Embolism (10/09/2023 16:41)   MPRESSION:  1.  Right upper lobe mass in association with pneumonia has increased in  size and extent when compared to the previous exam with extension to the  pleural surface. Masslike component is approximately 56.8 x 44.5 mm and  was previously 40.1 x 36.7 mm. This would correspond to primary  malignancy.  2.  Central bronchial wall thickening. Can be seen with reactive airway  disease or bronchitis.  3.  1.3 cm left adrenal  nodule is noted. No significant change from  previous.  4.  Increased size of paratracheal and mediastinal lymph nodes. A right  paratracheal lymph node is 2.5 cm and was previously 2.2 cm.  5. No PE identified.  6. Marked cardiomegaly, stable.  7. Otherwise stable chest with other nonacute/incidental findings as  above.    CT Head Without Contrast (10/09/2023 12:00)   IMPRESSION:    Unremarkable exam demonstrating no CT evidence of acute intracranial  findings.      CT Chest Without Contrast Diagnostic (08/09/2023 12:38)       NM PET/CT Skull Base to Mid Thigh (06/20/2023 11:12)         CT Chest Low Dose Cancer Screening WO (02/14/2023 12:58)   IMPRESSION:    Interval development or enlargement of a 1.9 cm spiculated right upper  lobe pulmonary nodule concerning in appearance for malignancy and PET/CT  is recommended for further evaluation.        Pathology:     Tissue Pathology Exam (09/06/2023 08:13)         6/29/23           Assessment / Plan         Assessment and Plan   Mayra Hays is a 78 y.o. year old presents for       Non small cell lung cancer   Cancer Staging   Stage IIIA (cT1c, cN2, cM0)  -Oncology history as above. Patient with 1.9 cm spiculated right upper lobe nodule on low dose CT chest screen (2/2023). EBUS by Dr. Peter negative for malignancy (2/2023). PET/CT with hypermetabolism RUL 2.1 cm and pretracheal region lymph node (6/2023). CT guided biopsy negative for malignancy (6/2023). Navigational bronchoscopy with positive RUL and  4R (paratracheal) for squamous cell (9/6/2023). PD-L1 TPS score 60%.   -Patient not deemed for surgical resection. We reviewed her staging and treatment options which include concurrent chemo-radiation followed by adjuvant immunotherapy. Chemotherapy agents to be used would include weekly carboplatin AUC2 and paclitaxel 50 mg/m2. Chemo will be followed by one year of Durvalumab based on PACIFIC trial.   -Patient experienced anaphylaxis 8 minutes into the paclitaxel  on first cycle 10/9, carboplatin was not administered. Due to this, paclitaxel will be omitted from weekly chemotherapy.  -Radiation therapy started 10/10; 6000 cGy in 30 treatment fractions   -C1 10/18- tolerated well   -C2 10/24- tolerated well  -C3 11/01/23 - tolerated well  - C4 11/08/23 - tolerated well  - C5 11/15/23- proceed today    2. Anemia; suspicious for iron deficiency anemia due to GIB  - Patient with normal H/H in our system 2/2023, with acute drop in Hgb 6/29 (10.7) to Hgb (6.5) on 8/9 requiring transfusion. Patient previously reported one episode of melena  (she is a poor historian).   -Last colonoscopy and endoscopy are unknown; think it may have been >5 years ago  -CBC from 8/14 show Hgb 8.2, Hct 27, MCV 90. Normal WBC and platelet count. Iron studies with normal iron (37), TIBC (435), and low iron saturation (9). This is in light of recent blood transfusion. Normal folate and B12 level. Reticulocyte count noted to be elevated to 6%  -Patient is with fatigue. Denies shortness of breath (aside from baseline COPD) or chest pain. Continues to be on Eliquis for Afib  -Initial work up from consultation confirmed iron deficiency anemia, normal folate/b12 levels. No indication of hemolysis seen with normal LDH and haptoglobin. Myeloma work up has been negative with no M spike on serum protein electrophoresis and normal k/l free light chain ratio. Peripheral smear with normal total WBC without granulocytic dysplasia or blasts.  -Received Ferumoxytol 8/29/2023  - Hg from today (10.8), stable.     3. Malignancy associated pain  - Currently denies        Discussed possible differential diagnoses, testing, treatment, recommended non-pharmacological interventions, risks, warning signs to monitor for that would indicate need for follow-up in clinic or ER. If no improvement with these regimens or you have new or worsening symptoms follow-up. Patient verbalizes understanding and agreement with plan of care.  Denies further needs or concerns.     Patient was given instructions and counseling regarding her condition and for health maintenance advised.       All questions were answered to her satisfaction.              Meds ordered during this visit  No orders of the defined types were placed in this encounter.      Visit Diagnoses    ICD-10-CM ICD-9-CM   1. Non-small cell cancer of right lung  C34.91 162.9   2. Iron deficiency anemia, unspecified iron deficiency anemia type  D50.9 280.9   3. Malabsorption due to intolerance, not elsewhere classified  K90.49 579.8               Follow Up   Follow up with Dr. Bond on 11/22/23 with repeat iron studies, ferritin as well as treatment labs  Order CT chest for follow up after last cycle, followed by maintenance durvalumab          I spent 30 minutes caring for Mayra on this date of service. This time includes time spent by me in the following activities: preparing for the visit, reviewing tests, obtaining and/or reviewing a separately obtained history, performing a medically appropriate examination and/or evaluation, counseling and educating the patient/family/caregiver, ordering medications, tests, or procedures, documenting information in the medical record, independently interpreting results and communicating that information with the patient/family/caregiver, and care coordination.           This document has been electronically signed by ERASTO Lyons   November 16, 2023 13:38 EST

## 2023-11-16 ENCOUNTER — HOSPITAL ENCOUNTER (OUTPATIENT)
Dept: RADIATION ONCOLOGY | Facility: HOSPITAL | Age: 78
Discharge: HOME | End: 2023-11-16

## 2023-11-16 LAB
RAD ONC ARIA COURSE ID: NORMAL
RAD ONC ARIA COURSE INTENT: NORMAL
RAD ONC ARIA COURSE LAST TREATMENT DATE: NORMAL
RAD ONC ARIA COURSE START DATE: NORMAL
RAD ONC ARIA COURSE TREATMENT ELAPSED DAYS: 37
RAD ONC ARIA FIRST TREATMENT DATE: NORMAL
RAD ONC ARIA PLAN FRACTIONS TREATED TO DATE: 27
RAD ONC ARIA PLAN ID: NORMAL
RAD ONC ARIA PLAN PRESCRIBED DOSE PER FRACTION: 2 GY
RAD ONC ARIA PLAN PRIMARY REFERENCE POINT: NORMAL
RAD ONC ARIA PLAN TOTAL FRACTIONS PRESCRIBED: 30
RAD ONC ARIA PLAN TOTAL PRESCRIBED DOSE: 6000 CGY
RAD ONC ARIA REFERENCE POINT DOSAGE GIVEN TO DATE: 54 GY
RAD ONC ARIA REFERENCE POINT ID: NORMAL
RAD ONC ARIA REFERENCE POINT SESSION DOSAGE GIVEN: 2 GY

## 2023-11-16 PROCEDURE — 77386: CPT | Performed by: RADIOLOGY

## 2023-11-17 ENCOUNTER — HOSPITAL ENCOUNTER (OUTPATIENT)
Dept: RADIATION ONCOLOGY | Facility: HOSPITAL | Age: 78
Discharge: HOME | End: 2023-11-17

## 2023-11-17 LAB
RAD ONC ARIA COURSE ID: NORMAL
RAD ONC ARIA COURSE INTENT: NORMAL
RAD ONC ARIA COURSE LAST TREATMENT DATE: NORMAL
RAD ONC ARIA COURSE START DATE: NORMAL
RAD ONC ARIA COURSE TREATMENT ELAPSED DAYS: 38
RAD ONC ARIA FIRST TREATMENT DATE: NORMAL
RAD ONC ARIA PLAN FRACTIONS TREATED TO DATE: 28
RAD ONC ARIA PLAN ID: NORMAL
RAD ONC ARIA PLAN PRESCRIBED DOSE PER FRACTION: 2 GY
RAD ONC ARIA PLAN PRIMARY REFERENCE POINT: NORMAL
RAD ONC ARIA PLAN TOTAL FRACTIONS PRESCRIBED: 30
RAD ONC ARIA PLAN TOTAL PRESCRIBED DOSE: 6000 CGY
RAD ONC ARIA REFERENCE POINT DOSAGE GIVEN TO DATE: 56 GY
RAD ONC ARIA REFERENCE POINT ID: NORMAL
RAD ONC ARIA REFERENCE POINT SESSION DOSAGE GIVEN: 2 GY

## 2023-11-17 PROCEDURE — 77386: CPT | Performed by: RADIOLOGY

## 2023-11-20 ENCOUNTER — HOSPITAL ENCOUNTER (OUTPATIENT)
Dept: RADIATION ONCOLOGY | Facility: HOSPITAL | Age: 78
Discharge: HOME | End: 2023-11-20
Payer: MEDICARE

## 2023-11-20 ENCOUNTER — HOSPITAL ENCOUNTER (OUTPATIENT)
Dept: RADIATION ONCOLOGY | Facility: HOSPITAL | Age: 78
Discharge: HOME | End: 2023-11-20

## 2023-11-20 ENCOUNTER — LAB (OUTPATIENT)
Dept: ONCOLOGY | Facility: HOSPITAL | Age: 78
End: 2023-11-20
Payer: MEDICARE

## 2023-11-20 VITALS
TEMPERATURE: 98.2 F | DIASTOLIC BLOOD PRESSURE: 77 MMHG | RESPIRATION RATE: 18 BRPM | OXYGEN SATURATION: 98 % | SYSTOLIC BLOOD PRESSURE: 141 MMHG | HEART RATE: 102 BPM

## 2023-11-20 DIAGNOSIS — K90.49 MALABSORPTION DUE TO INTOLERANCE, NOT ELSEWHERE CLASSIFIED: ICD-10-CM

## 2023-11-20 DIAGNOSIS — C34.91 NON-SMALL CELL CANCER OF RIGHT LUNG: ICD-10-CM

## 2023-11-20 DIAGNOSIS — C34.11 PRIMARY CANCER OF RIGHT UPPER LOBE OF LUNG: ICD-10-CM

## 2023-11-20 DIAGNOSIS — D50.9 IRON DEFICIENCY ANEMIA, UNSPECIFIED IRON DEFICIENCY ANEMIA TYPE: ICD-10-CM

## 2023-11-20 LAB
ALBUMIN SERPL-MCNC: 4.3 G/DL (ref 3.5–5.2)
ALBUMIN/GLOB SERPL: 1.5 G/DL
ALP SERPL-CCNC: 133 U/L (ref 39–117)
ALT SERPL W P-5'-P-CCNC: 10 U/L (ref 1–33)
ANION GAP SERPL CALCULATED.3IONS-SCNC: 10.3 MMOL/L (ref 5–15)
AST SERPL-CCNC: 15 U/L (ref 1–32)
BASOPHILS # BLD AUTO: 0.04 10*3/MM3 (ref 0–0.2)
BASOPHILS NFR BLD AUTO: 0.7 % (ref 0–1.5)
BILIRUB SERPL-MCNC: 0.2 MG/DL (ref 0–1.2)
BUN SERPL-MCNC: 15 MG/DL (ref 8–23)
BUN/CREAT SERPL: 12.3 (ref 7–25)
CALCIUM SPEC-SCNC: 9.5 MG/DL (ref 8.6–10.5)
CHLORIDE SERPL-SCNC: 100 MMOL/L (ref 98–107)
CO2 SERPL-SCNC: 28.7 MMOL/L (ref 22–29)
CREAT SERPL-MCNC: 1.22 MG/DL (ref 0.57–1)
DEPRECATED RDW RBC AUTO: 52 FL (ref 37–54)
EGFRCR SERPLBLD CKD-EPI 2021: 45.8 ML/MIN/1.73
EOSINOPHIL # BLD AUTO: 0.22 10*3/MM3 (ref 0–0.4)
EOSINOPHIL NFR BLD AUTO: 4 % (ref 0.3–6.2)
ERYTHROCYTE [DISTWIDTH] IN BLOOD BY AUTOMATED COUNT: 16.7 % (ref 12.3–15.4)
FERRITIN SERPL-MCNC: 60.9 NG/ML (ref 13–150)
GLOBULIN UR ELPH-MCNC: 2.8 GM/DL
GLUCOSE SERPL-MCNC: 163 MG/DL (ref 65–99)
HCT VFR BLD AUTO: 38.3 % (ref 34–46.6)
HGB BLD-MCNC: 11.6 G/DL (ref 12–15.9)
IMM GRANULOCYTES # BLD AUTO: 0.02 10*3/MM3 (ref 0–0.05)
IMM GRANULOCYTES NFR BLD AUTO: 0.4 % (ref 0–0.5)
IRON 24H UR-MRATE: 50 MCG/DL (ref 37–145)
IRON SATN MFR SERPL: 11 % (ref 20–50)
LYMPHOCYTES # BLD AUTO: 0.88 10*3/MM3 (ref 0.7–3.1)
LYMPHOCYTES NFR BLD AUTO: 15.8 % (ref 19.6–45.3)
MCH RBC QN AUTO: 26.2 PG (ref 26.6–33)
MCHC RBC AUTO-ENTMCNC: 30.3 G/DL (ref 31.5–35.7)
MCV RBC AUTO: 86.5 FL (ref 79–97)
MONOCYTES # BLD AUTO: 0.36 10*3/MM3 (ref 0.1–0.9)
MONOCYTES NFR BLD AUTO: 6.5 % (ref 5–12)
NEUTROPHILS NFR BLD AUTO: 4.04 10*3/MM3 (ref 1.7–7)
NEUTROPHILS NFR BLD AUTO: 72.6 % (ref 42.7–76)
NRBC BLD AUTO-RTO: 0 /100 WBC (ref 0–0.2)
PLATELET # BLD AUTO: 153 10*3/MM3 (ref 140–450)
PMV BLD AUTO: 10.7 FL (ref 6–12)
POTASSIUM SERPL-SCNC: 4.6 MMOL/L (ref 3.5–5.2)
PROT SERPL-MCNC: 7.1 G/DL (ref 6–8.5)
RAD ONC ARIA COURSE ID: NORMAL
RAD ONC ARIA COURSE INTENT: NORMAL
RAD ONC ARIA COURSE LAST TREATMENT DATE: NORMAL
RAD ONC ARIA COURSE START DATE: NORMAL
RAD ONC ARIA COURSE TREATMENT ELAPSED DAYS: 41
RAD ONC ARIA FIRST TREATMENT DATE: NORMAL
RAD ONC ARIA PLAN FRACTIONS TREATED TO DATE: 29
RAD ONC ARIA PLAN ID: NORMAL
RAD ONC ARIA PLAN PRESCRIBED DOSE PER FRACTION: 2 GY
RAD ONC ARIA PLAN PRIMARY REFERENCE POINT: NORMAL
RAD ONC ARIA PLAN TOTAL FRACTIONS PRESCRIBED: 30
RAD ONC ARIA PLAN TOTAL PRESCRIBED DOSE: 6000 CGY
RAD ONC ARIA REFERENCE POINT DOSAGE GIVEN TO DATE: 58 GY
RAD ONC ARIA REFERENCE POINT ID: NORMAL
RAD ONC ARIA REFERENCE POINT SESSION DOSAGE GIVEN: 2 GY
RBC # BLD AUTO: 4.43 10*6/MM3 (ref 3.77–5.28)
SODIUM SERPL-SCNC: 139 MMOL/L (ref 136–145)
TIBC SERPL-MCNC: 450 MCG/DL (ref 298–536)
TRANSFERRIN SERPL-MCNC: 302 MG/DL (ref 200–360)
WBC NRBC COR # BLD AUTO: 5.56 10*3/MM3 (ref 3.4–10.8)

## 2023-11-20 PROCEDURE — 77386: CPT | Performed by: RADIOLOGY

## 2023-11-20 PROCEDURE — 80053 COMPREHEN METABOLIC PANEL: CPT | Performed by: NURSE PRACTITIONER

## 2023-11-20 PROCEDURE — 85025 COMPLETE CBC W/AUTO DIFF WBC: CPT | Performed by: NURSE PRACTITIONER

## 2023-11-20 PROCEDURE — 84466 ASSAY OF TRANSFERRIN: CPT | Performed by: INTERNAL MEDICINE

## 2023-11-20 PROCEDURE — 82728 ASSAY OF FERRITIN: CPT | Performed by: INTERNAL MEDICINE

## 2023-11-20 PROCEDURE — 83540 ASSAY OF IRON: CPT | Performed by: INTERNAL MEDICINE

## 2023-11-20 NOTE — PROGRESS NOTES
On Treatment Visit Note      Patient Name: Mayra Hays  : 1945   MRN: [id number]     Diagnosis:    Chief Complaint   Patient presents with    Lung Cancer     Non-Small Cell Lung Cancer       Staging: Primary cancer of right upper lobe of lung  - Stage IIIA (cT1c, cN2, cM0)       This patient was seen today for an on treatment visit.     Radiation Treatments       Active   Plans   RUL   Most recent treatment: Dose planned: 200 cGy (fraction 29 on 2023)   Total: Dose planned: 6,000 cGy (30 fractions)   Elapsed Days: 41      Reference Points   PTV   Most recent treatment: Dose given: 200 cGy (on 2023)   Total: Dose given: 5,800 cGy   Elapsed Days: 41           Historical   No historical radiation treatments to show.              Subjective      Review of Systems:   She reports feeling well with no esophagitis complaints, fatigue complaints or other issues.  She wishes to know how we follow lung cancer in this context and I talk about follow-up imaging.  Review of Systems    Medications:     Current Outpatient Medications:     acetaminophen (TYLENOL) 325 MG tablet, Take 2 tablets by mouth Every 4 (Four) Hours As Needed for Mild Pain., Disp: 30 tablet, Rfl: 0    albuterol sulfate  (90 Base) MCG/ACT inhaler, Inhale 2 puffs Every 4 (Four) Hours As Needed for Wheezing or Shortness of Air., Disp: 18 g, Rfl: 8    apixaban (ELIQUIS) 5 MG tablet tablet, Take 1 tablet by mouth Every 12 (Twelve) Hours., Disp: 180 tablet, Rfl: 3    atorvastatin (LIPITOR) 20 MG tablet, Take 1 tablet by mouth Daily., Disp: 90 tablet, Rfl: 3    Budeson-Glycopyrrol-Formoterol (Breztri Aerosphere) 160-9-4.8 MCG/ACT aerosol inhaler, Inhale 2 puffs 2 (Two) Times a Day., Disp: 10.7 g, Rfl: 8    esomeprazole (nexIUM) 20 MG capsule, Take 1 capsule by mouth Daily., Disp: , Rfl:     ibuprofen (ADVIL,MOTRIN) 600 MG tablet, Take 1 tablet by mouth Every 6 (Six) Hours As Needed for Mild Pain., Disp: 30 tablet, Rfl: 0     ipratropium-albuterol (DUO-NEB) 0.5-2.5 mg/3 ml nebulizer, USE 1 VIAL IN NEBULIZER 4 TIMES DAILY - and as needed, Disp: 360 mL, Rfl: 11    lidocaine-prilocaine (EMLA) 2.5-2.5 % cream, Apply to port-a-cath site 30 minutes prior to arrival at infusion center. Cover with plastic wrap., Disp: 30 g, Rfl: 1    lisinopril (PRINIVIL,ZESTRIL) 5 MG tablet, Take 1 tablet by mouth Daily., Disp: 90 tablet, Rfl: 3    loratadine (CLARITIN) 10 MG tablet, Take 1 tablet by mouth Daily As Needed for Allergies., Disp: , Rfl:     methocarbamol (Robaxin) 500 MG tablet, Take 1 tablet by mouth 3 (Three) Times a Day As Needed for Muscle Spasms., Disp: 30 tablet, Rfl: 2    O2 (OXYGEN), Inhale 2 L/min As Needed., Disp: , Rfl:     ondansetron (Zofran) 8 MG tablet, Take 1 tablet by mouth Every 8 (Eight) Hours As Needed for Nausea or Vomiting., Disp: 30 tablet, Rfl: 1    pantoprazole (PROTONIX) 40 MG EC tablet, Take 1 tablet by mouth Daily As Needed (acid reflux)., Disp: , Rfl:     potassium chloride (K-DUR,KLOR-CON) 20 MEQ CR tablet, Take 1 tablet by mouth Daily., Disp: , Rfl:     prochlorperazine (COMPAZINE) 10 MG tablet, Take 1 tablet by mouth Every 6 (Six) Hours As Needed for Nausea or Vomiting., Disp: 30 tablet, Rfl: 1    Stool Softener 100 MG capsule, Take 1 capsule by mouth 2 (two) times a day., Disp: , Rfl:     trimethoprim (TRIMPEX) 100 MG tablet, Take 1 tablet by mouth 2 (Two) Times a Day., Disp: 30 tablet, Rfl: 1    Allergies:   Allergies   Allergen Reactions    Paclitaxel Anaphylaxis     Objective     Physical Exam:  Physical Exam    Vital Signs: There were no vitals filed for this visit.  There is no height or weight on file to calculate BMI.     Current Total XRT Dose (cGY):    Radiation Treatments       Active   Plans   RUL   Most recent treatment: Dose planned: 200 cGy (fraction 29 on 11/20/2023)   Total: Dose planned: 6,000 cGy (30 fractions)   Elapsed Days: 41      Reference Points   PTV   Most recent treatment: Dose given: 200  cGy (on 11/20/2023)   Total: Dose given: 5,800 cGy   Elapsed Days: 41           Historical   No historical radiation treatments to show.              Plan      Plan:   Patient was seen today for an on treatment visit. Patient is receiving radiation therapy to the right upper lung and involved lymph nodes.. Patient is stable and tolerating radiation therapy well with minimal side effects. Continue with radiation therapy.  Following 1 additional treatment she will be given an appointment for follow-up.  I explained to her typical imaging procedures post completion of radiotherapy.  At present however she is looking well and feeling well hopefully she will have enjoyed an excellent response.    I have reviewed treatment setup notes, checked and approved the daily guidance images. I reviewed dose delivery, treatment parameters and deemed them appropriate. We plan to continue radiation therapy as prescribed.     Digital speech recognition software was used to dictate parts of this note, some dictation errors may occur.

## 2023-11-20 NOTE — ADDENDUM NOTE
Encounter addended by: Altagracia Manjarrez MA on: 11/20/2023 2:49 PM   Actions taken: Vitals modified

## 2023-11-21 ENCOUNTER — OFFICE VISIT (OUTPATIENT)
Dept: ONCOLOGY | Facility: CLINIC | Age: 78
End: 2023-11-21
Payer: MEDICARE

## 2023-11-21 ENCOUNTER — INFUSION (OUTPATIENT)
Dept: ONCOLOGY | Facility: HOSPITAL | Age: 78
End: 2023-11-21
Payer: MEDICARE

## 2023-11-21 ENCOUNTER — HOSPITAL ENCOUNTER (OUTPATIENT)
Dept: RADIATION ONCOLOGY | Facility: HOSPITAL | Age: 78
Discharge: HOME | End: 2023-11-21

## 2023-11-21 VITALS
OXYGEN SATURATION: 93 % | SYSTOLIC BLOOD PRESSURE: 148 MMHG | HEART RATE: 74 BPM | DIASTOLIC BLOOD PRESSURE: 70 MMHG | TEMPERATURE: 97.5 F | RESPIRATION RATE: 18 BRPM

## 2023-11-21 VITALS
TEMPERATURE: 97.5 F | RESPIRATION RATE: 18 BRPM | DIASTOLIC BLOOD PRESSURE: 70 MMHG | SYSTOLIC BLOOD PRESSURE: 148 MMHG | OXYGEN SATURATION: 93 % | HEART RATE: 74 BPM

## 2023-11-21 DIAGNOSIS — Z95.828 PORT-A-CATH IN PLACE: ICD-10-CM

## 2023-11-21 DIAGNOSIS — C34.11 PRIMARY CANCER OF RIGHT UPPER LOBE OF LUNG: Primary | ICD-10-CM

## 2023-11-21 DIAGNOSIS — C34.91 NON-SMALL CELL CANCER OF RIGHT LUNG: ICD-10-CM

## 2023-11-21 DIAGNOSIS — K90.49 MALABSORPTION DUE TO INTOLERANCE, NOT ELSEWHERE CLASSIFIED: ICD-10-CM

## 2023-11-21 DIAGNOSIS — D50.9 IRON DEFICIENCY ANEMIA, UNSPECIFIED IRON DEFICIENCY ANEMIA TYPE: ICD-10-CM

## 2023-11-21 DIAGNOSIS — C34.91 NON-SMALL CELL CANCER OF RIGHT LUNG: Primary | ICD-10-CM

## 2023-11-21 LAB
RAD ONC ARIA COURSE ID: NORMAL
RAD ONC ARIA COURSE INTENT: NORMAL
RAD ONC ARIA COURSE LAST TREATMENT DATE: NORMAL
RAD ONC ARIA COURSE START DATE: NORMAL
RAD ONC ARIA COURSE TREATMENT ELAPSED DAYS: 42
RAD ONC ARIA FIRST TREATMENT DATE: NORMAL
RAD ONC ARIA PLAN FRACTIONS TREATED TO DATE: 30
RAD ONC ARIA PLAN ID: NORMAL
RAD ONC ARIA PLAN PRESCRIBED DOSE PER FRACTION: 2 GY
RAD ONC ARIA PLAN PRIMARY REFERENCE POINT: NORMAL
RAD ONC ARIA PLAN TOTAL FRACTIONS PRESCRIBED: 30
RAD ONC ARIA PLAN TOTAL PRESCRIBED DOSE: 6000 CGY
RAD ONC ARIA REFERENCE POINT DOSAGE GIVEN TO DATE: 60 GY
RAD ONC ARIA REFERENCE POINT ID: NORMAL
RAD ONC ARIA REFERENCE POINT SESSION DOSAGE GIVEN: 2 GY

## 2023-11-21 PROCEDURE — 25010000002 PALONOSETRON 0.25 MG/5ML SOLUTION PREFILLED SYRINGE: Performed by: INTERNAL MEDICINE

## 2023-11-21 PROCEDURE — 96413 CHEMO IV INFUSION 1 HR: CPT

## 2023-11-21 PROCEDURE — 25010000002 HEPARIN LOCK FLUSH PER 10 UNITS: Performed by: NURSE PRACTITIONER

## 2023-11-21 PROCEDURE — 25010000002 DEXAMETHASONE SODIUM PHOSPHATE 20 MG/5ML SOLUTION: Performed by: INTERNAL MEDICINE

## 2023-11-21 PROCEDURE — 25010000002 CARBOPLATIN PER 50 MG: Performed by: INTERNAL MEDICINE

## 2023-11-21 PROCEDURE — 77386: CPT | Performed by: RADIOLOGY

## 2023-11-21 PROCEDURE — 96375 TX/PRO/DX INJ NEW DRUG ADDON: CPT

## 2023-11-21 PROCEDURE — 25810000003 SODIUM CHLORIDE 0.9 % SOLUTION: Performed by: INTERNAL MEDICINE

## 2023-11-21 RX ORDER — HEPARIN SODIUM (PORCINE) LOCK FLUSH IV SOLN 100 UNIT/ML 100 UNIT/ML
500 SOLUTION INTRAVENOUS AS NEEDED
OUTPATIENT
Start: 2023-11-21

## 2023-11-21 RX ORDER — SODIUM CHLORIDE 9 MG/ML
250 INJECTION, SOLUTION INTRAVENOUS ONCE
Status: COMPLETED | OUTPATIENT
Start: 2023-11-21 | End: 2023-11-21

## 2023-11-21 RX ORDER — SODIUM CHLORIDE 0.9 % (FLUSH) 0.9 %
20 SYRINGE (ML) INJECTION AS NEEDED
OUTPATIENT
Start: 2023-11-21

## 2023-11-21 RX ORDER — SODIUM CHLORIDE 9 MG/ML
250 INJECTION, SOLUTION INTRAVENOUS ONCE
Status: CANCELLED | OUTPATIENT
Start: 2023-11-21

## 2023-11-21 RX ORDER — FAMOTIDINE 10 MG/ML
20 INJECTION, SOLUTION INTRAVENOUS AS NEEDED
Status: CANCELLED | OUTPATIENT
Start: 2023-11-21

## 2023-11-21 RX ORDER — HEPARIN SODIUM (PORCINE) LOCK FLUSH IV SOLN 100 UNIT/ML 100 UNIT/ML
300 SOLUTION INTRAVENOUS ONCE
OUTPATIENT
Start: 2023-11-21

## 2023-11-21 RX ORDER — PALONOSETRON 0.05 MG/ML
0.25 INJECTION, SOLUTION INTRAVENOUS ONCE
Status: CANCELLED | OUTPATIENT
Start: 2023-11-21

## 2023-11-21 RX ORDER — SODIUM CHLORIDE 9 MG/ML
20 INJECTION, SOLUTION INTRAVENOUS ONCE
Status: CANCELLED | OUTPATIENT
Start: 2023-11-21

## 2023-11-21 RX ORDER — SODIUM CHLORIDE 0.9 % (FLUSH) 0.9 %
10 SYRINGE (ML) INJECTION AS NEEDED
OUTPATIENT
Start: 2023-11-21

## 2023-11-21 RX ORDER — DIPHENHYDRAMINE HYDROCHLORIDE 50 MG/ML
50 INJECTION INTRAMUSCULAR; INTRAVENOUS AS NEEDED
Status: CANCELLED | OUTPATIENT
Start: 2023-11-21

## 2023-11-21 RX ORDER — SODIUM CHLORIDE 0.9 % (FLUSH) 0.9 %
10 SYRINGE (ML) INJECTION AS NEEDED
Status: DISCONTINUED | OUTPATIENT
Start: 2023-11-21 | End: 2023-11-21 | Stop reason: HOSPADM

## 2023-11-21 RX ORDER — HEPARIN SODIUM (PORCINE) LOCK FLUSH IV SOLN 100 UNIT/ML 100 UNIT/ML
500 SOLUTION INTRAVENOUS AS NEEDED
Status: DISCONTINUED | OUTPATIENT
Start: 2023-11-21 | End: 2023-11-21 | Stop reason: HOSPADM

## 2023-11-21 RX ORDER — PALONOSETRON 0.05 MG/ML
0.25 INJECTION, SOLUTION INTRAVENOUS ONCE
Status: COMPLETED | OUTPATIENT
Start: 2023-11-21 | End: 2023-11-21

## 2023-11-21 RX ADMIN — HEPARIN 500 UNITS: 100 SYRINGE at 14:55

## 2023-11-21 RX ADMIN — DEXAMETHASONE SODIUM PHOSPHATE 12 MG: 4 INJECTION, SOLUTION INTRA-ARTICULAR; INTRALESIONAL; INTRAMUSCULAR; INTRAVENOUS; SOFT TISSUE at 13:59

## 2023-11-21 RX ADMIN — CARBOPLATIN 160 MG: 10 INJECTION, SOLUTION INTRAVENOUS at 14:20

## 2023-11-21 RX ADMIN — SODIUM CHLORIDE 250 ML: 9 INJECTION, SOLUTION INTRAVENOUS at 13:58

## 2023-11-21 RX ADMIN — PALONOSETRON 0.25 MG: 0.25 INJECTION, SOLUTION INTRAVENOUS at 13:58

## 2023-11-21 RX ADMIN — Medication 10 ML: at 14:55

## 2023-11-21 NOTE — PROGRESS NOTES
Subjective     Date: 2023    Referring Provider  No ref. provider found    Chief Complaint  Symptomatic anemia   2. Non small Cell Lung Cancer  Cancer Staging   Stage IIIA (cT1c, cN2, cM0)        Subjective      Mayra Hays is a 78 y.o. female who presents today to [facility]EMAT[facility]Y for follow up.    HPI:   78 y.o. female with past medical history of diabetes mellitus type 2, hyperlipidemia, COPD, hypertension, atrial fibrillation on anticoagulation (Eliquis), h/o CVA and previous tobacco use presents for symptomatic anemia and NSCLC.    She is present today with her daughter, Hailey, who is assisting with history. Pt started work up for RUL lung nodule , since then noticed to have a decrease in her Hgb to 10.7 from normal in 2023. More recently, her Hgb 8/9 was 6.5, she was directed to ED where she received 1U PRBC.     Patient reports increased fatigue over the last several months. She reports no bleeding, however does report dark colored stool (melena) two days ago. Denies any other evidence of melena or hematochezia.     She has previously been diagnosed with iron deficiency (years ago) requiring oral iron supplements, but never received IV iron or bone marrow biopsy.    She and her daughter are unsure of her last colonoscopy and endoscopy history, think it may have occurred >5 years ago but unsure.    She denies recent weight loss, fever, chills,  night sweats.  Previous smoker, quit 5-6 years ago, smoked 3 packs/day X 30 years. Denies alcohol or drug use. Family history significant for son with leukemia, brothers with lung cancer.     Oncology History:  2023: low dose CT chest screenin.9 cm spiculated right upper lobe pulmonary nodule concerning foe malignancy, PET/CT recommended.   2023: EBUS by Dr. Peter negative for malignancy for bronchial washing and FNA of station 10R  2023: PET/CT: Right upper lobe pulmonary nodule, more  posterior possible satellite nodules are postobstructive process measuring 2.1 cm with mSUV 14.5. Also with pretracheal region lymph node 2.6 cm.   6/29/2023: CT guided needle biopsy was non diagnostic, showed fibrosis and chronic inflammation.   8/9/2023: Lab work showed anemia with Hgb 6.5. Referred to ED. Bronchoscopy deferred.   9/6/2023: Navigational bronchoscopy performed by Dr. Clemens- Right upper lobe lung transbronchial biopsy revealed squamous cell carcinoma, lymph node at station 4R also involved with squamous cell carcinoma. PD-L1 TPS 60%. Patient not deemed a surgical candidate.    Ms. Hays presents today with her daughter, grand daughter, and great grand daughters. She is in a wheelchair. She lives alone with her grand son, able to perform her ADLs including cooking, cleaning.     Treatment history:        Interval History 10/11/2023   Ms. Hays presents for follow up today, accompanied by her daughter in law. She started treatment with paclitaxel and carboplatin on 10/9, unfortunately had a anaphylactic reaction to taxol after 8 minutes of infusion causing her to go to the ED. Patient was awake and feeling back to herself at the time of ED visit. Carboplatin was not administered.     She reports doing well overall, no new symptoms. Started radiation therapy yesterday.      Interval History 10/24/2023   Ms. Hays presents for cycle 2 of carboplatin with concurrent radiation. Did well with first cycle. Denies any adverse effects including nausea, vomiting, diarrhea, neuropathy. Appetite is good. Radiation is going well. No complaints today.  Denies any skin rash or bites.    Interval History 11/21/2023   Ms. Hays presents today for cycle 7 of carboplatin with concurrent radiation. She reports good tolerance thus far without neuropathy, nausea, vomiting, diarrhea. Appetite is stable.   Denies any GIB.    The following portions of the patient's history were reviewed and updated as appropriate:  allergies, current medications, past family history, past medical history, past social history, past surgical history and problem list.    Objective     Objective     Allergy:   Allergies   Allergen Reactions    Paclitaxel Anaphylaxis        Current Medications:   Current Outpatient Medications   Medication Sig Dispense Refill    acetaminophen (TYLENOL) 325 MG tablet Take 2 tablets by mouth Every 4 (Four) Hours As Needed for Mild Pain. 30 tablet 0    albuterol sulfate  (90 Base) MCG/ACT inhaler Inhale 2 puffs Every 4 (Four) Hours As Needed for Wheezing or Shortness of Air. 18 g 8    apixaban (ELIQUIS) 5 MG tablet tablet Take 1 tablet by mouth Every 12 (Twelve) Hours. 180 tablet 3    atorvastatin (LIPITOR) 20 MG tablet Take 1 tablet by mouth Daily. 90 tablet 3    Budeson-Glycopyrrol-Formoterol (Breztri Aerosphere) 160-9-4.8 MCG/ACT aerosol inhaler Inhale 2 puffs 2 (Two) Times a Day. 10.7 g 8    esomeprazole (nexIUM) 20 MG capsule Take 1 capsule by mouth Daily.      ibuprofen (ADVIL,MOTRIN) 600 MG tablet Take 1 tablet by mouth Every 6 (Six) Hours As Needed for Mild Pain. 30 tablet 0    ipratropium-albuterol (DUO-NEB) 0.5-2.5 mg/3 ml nebulizer USE 1 VIAL IN NEBULIZER 4 TIMES DAILY - and as needed 360 mL 11    lidocaine-prilocaine (EMLA) 2.5-2.5 % cream Apply to port-a-cath site 30 minutes prior to arrival at infusion center. Cover with plastic wrap. 30 g 1    lisinopril (PRINIVIL,ZESTRIL) 5 MG tablet Take 1 tablet by mouth Daily. 90 tablet 3    loratadine (CLARITIN) 10 MG tablet Take 1 tablet by mouth Daily As Needed for Allergies.      methocarbamol (Robaxin) 500 MG tablet Take 1 tablet by mouth 3 (Three) Times a Day As Needed for Muscle Spasms. 30 tablet 2    O2 (OXYGEN) Inhale 2 L/min As Needed.      ondansetron (Zofran) 8 MG tablet Take 1 tablet by mouth Every 8 (Eight) Hours As Needed for Nausea or Vomiting. 30 tablet 1    pantoprazole (PROTONIX) 40 MG EC tablet Take 1 tablet by mouth Daily As Needed (acid  reflux).      potassium chloride (K-DUR,KLOR-CON) 20 MEQ CR tablet Take 1 tablet by mouth Daily.      prochlorperazine (COMPAZINE) 10 MG tablet Take 1 tablet by mouth Every 6 (Six) Hours As Needed for Nausea or Vomiting. 30 tablet 1    Stool Softener 100 MG capsule Take 1 capsule by mouth 2 (two) times a day.      trimethoprim (TRIMPEX) 100 MG tablet Take 1 tablet by mouth 2 (Two) Times a Day. 30 tablet 1     No current facility-administered medications for this visit.     Facility-Administered Medications Ordered in Other Visits   Medication Dose Route Frequency Provider Last Rate Last Admin    CARBOplatin (PARAPLATIN) 150 mg in sodium chloride 0.9 % 115 mL chemo IVPB  150 mg Intravenous Once Shawna Bond MD        dexAMETHasone (DECADRON) IVPB 12 mg  12 mg Intravenous Once Shawna Bond MD        palonosetron (ALOXI) injection 0.25 mg  0.25 mg Intravenous Once Shawna Bond MD        sodium chloride 0.9 % infusion 250 mL  250 mL Intravenous Once Shawna Bond MD           Past Medical History:  Past Medical History:   Diagnosis Date    Abnormal ECG     Arthritis     CHF (congestive heart failure)     Chronic kidney disease     Collapsed lung     Congenital heart disease     pt unsure of this    COPD (chronic obstructive pulmonary disease)     Coronary artery disease     Diabetes mellitus     TYPE 2    Elevated cholesterol     GERD (gastroesophageal reflux disease)     Heart valve disease     Hyperlipidemia     Hypertension     Myocardial infarction 2019    Primary cancer of right upper lobe of lung 9/12/2023    Sleep apnea     non compliant with CPAP    Stroke 2019    UTI (urinary tract infection)     Wears eyeglasses        Past Surgical History:  Past Surgical History:   Procedure Laterality Date    BRONCHOSCOPY Bilateral 02/27/2023    Procedure: BRONCHOSCOPY WITH ENDOBRONCHIAL ULTRASOUND;  Surgeon: Abdulkadir Peter MD;  Location: [facility];  Service: Pulmonary;  Laterality: Bilateral;     BRONCHOSCOPY WITH ION ROBOTIC ASSIST N/A 2023    Procedure: BRONCHOSCOPY WITH ION ROBOT AND EBUS;  Surgeon: John Clemens MD;  Location:  [facility];  Service: Robotics - Pulmonary;  Laterality: N/A;  Ion cath #6 - 0032,  #6 - 0030, cath guide # 0077. EBUS scope removed with balloon intact.    CARDIAC CATHETERIZATION N/A 2017    Procedure: Left Heart Cath;  Surgeon: Jatinder Matias MD;  [facility];  Service:     CARDIAC CATHETERIZATION N/A 2021    Procedure: Left Heart Cath;  Surgeon: Tolu Steinberg MD;  [facility];  Service: Cardiology;  Laterality: N/A;    COLONOSCOPY      ENDOSCOPY      GALLBLADDER SURGERY      PORTACATH PLACEMENT N/A 2023    Procedure: INSERTION OF PORTACATH;  Surgeon: Petr Velásquez MD;  Location:  [facility];  Service: General;  Laterality: N/A;    VENTRAL HERNIA REPAIR N/A 2020    Procedure: VENTRAL HERNIA REPAIR LAPAROSCOPIC WITH DAVINCI ROBOT;  Surgeon: Petr Velásquez MD;  Location:  [facility];  Service: DaVinci;  Laterality: N/A;       Social History:  Social History     Socioeconomic History    Marital status: Single    Number of children: 6   Tobacco Use    Smoking status: Former     Packs/day: 3     Types: Cigarettes     Start date: 4/3/1998     Quit date: 2015     Years since quittin.8     Passive exposure: Past    Smokeless tobacco: Never   Vaping Use    Vaping Use: Never used   Substance and Sexual Activity    Alcohol use: No    Drug use: No    Sexual activity: Never         Family History:  Family History   Problem Relation Age of Onset    Heart disease Mother         Rhianna sophia    Heart disease Father     Heart attack Father     Heart failure Father        Review of Systems:  Review of Systems   Constitutional:  Positive for fatigue.   Respiratory:  Positive for cough and shortness of breath.    All other systems reviewed and are negative.      Vital Signs:    /70   Pulse 74   Temp 97.5 °F (36.4 °C) (Temporal)   Resp 18   SpO2 93%      Physical Exam:  Physical Exam  Vitals reviewed.   Constitutional:       General: She is not in acute distress.     Appearance: Normal appearance. She is obese. She is not ill-appearing.      Comments: +hard of hearing   HENT:      Head: Normocephalic and atraumatic.      Mouth/Throat:      Mouth: Mucous membranes are moist.      Pharynx: Oropharynx is clear.   Eyes:      Conjunctiva/sclera: Conjunctivae normal.      Pupils: Pupils are equal, round, and reactive to light.   Cardiovascular:      Rate and Rhythm: Normal rate and regular rhythm.      Heart sounds: No murmur heard.  Pulmonary:      Effort: Pulmonary effort is normal. No respiratory distress.      Breath sounds: Wheezing present.   Abdominal:      General: Abdomen is flat. Bowel sounds are normal. There is no distension.      Palpations: Abdomen is soft. There is no mass.      Tenderness: There is no abdominal tenderness. There is no guarding.   Musculoskeletal:         General: No swelling. Normal range of motion.      Cervical back: Normal range of motion.   Lymphadenopathy:      Cervical: No cervical adenopathy.   Skin:     General: Skin is warm and dry.   Neurological:      General: No focal deficit present.      Mental Status: She is alert and oriented to person, place, and time. Mental status is at baseline.   Psychiatric:         Mood and Affect: Mood normal.         PHQ-9 Score  PHQ-9 Total Score:       Pain Score  Vitals:    11/21/23 1316   BP: 148/70   Pulse: 74   Resp: 18   Temp: 97.5 °F (36.4 °C)   TempSrc: Temporal   SpO2: 93%   PainSc: 0-No pain                         PAINSCOREQUALITYMETRIC:   Vitals:    11/21/23 1316   PainSc: 0-No pain                Lab Review  Lab Results   Component Value Date    WBC 5.56 11/20/2023    HGB 11.6 (L) 11/20/2023    HCT 38.3 11/20/2023    MCV 86.5 11/20/2023    RDW 16.7 (H) 11/20/2023     11/20/2023     NEUTRORELPCT 72.6 11/20/2023    LYMPHORELPCT 15.8 (L) 11/20/2023    MONORELPCT 6.5 11/20/2023    EOSRELPCT 4.0 11/20/2023    BASORELPCT 0.7 11/20/2023    NEUTROABS 4.04 11/20/2023    LYMPHSABS 0.88 11/20/2023     Lab Results   Component Value Date     11/20/2023    K 4.6 11/20/2023    CO2 28.7 11/20/2023     11/20/2023    BUN 15 11/20/2023    CREATININE 1.22 (H) 11/20/2023    EGFRIFNONA 51 (L) 11/22/2021    GLUCOSE 163 (H) 11/20/2023    CALCIUM 9.5 11/20/2023    ALKPHOS 133 (H) 11/20/2023    AST 15 11/20/2023    ALT 10 11/20/2023    BILITOT 0.2 11/20/2023    ALBUMIN 4.3 11/20/2023    PROTEINTOT 7.1 11/20/2023    MG 2.0 10/09/2023    PHOS 3.5 05/18/2020     Lab Results   Component Value Date    RETICCTPCT 4.26 (H) 08/16/2023     Lab Results   Component Value Date    FERRITIN 60.90 11/20/2023    IRON 50 11/20/2023    TIBC 450 11/20/2023    LABIRON 11 (L) 11/20/2023    EDONXDWO71 548 08/14/2023    FOLATE 12.90 08/14/2023        Radiology Results  CT Angiogram Chest Pulmonary Embolism (10/09/2023 16:41)   MPRESSION:  1.  Right upper lobe mass in association with pneumonia has increased in  size and extent when compared to the previous exam with extension to the  pleural surface. Masslike component is approximately 56.8 x 44.5 mm and  was previously 40.1 x 36.7 mm. This would correspond to primary  malignancy.  2.  Central bronchial wall thickening. Can be seen with reactive airway  disease or bronchitis.  3.  1.3 cm left adrenal nodule is noted. No significant change from  previous.  4.  Increased size of paratracheal and mediastinal lymph nodes. A right  paratracheal lymph node is 2.5 cm and was previously 2.2 cm.  5. No PE identified.  6. Marked cardiomegaly, stable.  7. Otherwise stable chest with other nonacute/incidental findings as  above.    CT Head Without Contrast (10/09/2023 12:00)   IMPRESSION:    Unremarkable exam demonstrating no CT evidence of acute intracranial  findings.      CT Chest Without  Contrast Diagnostic (08/09/2023 12:38)       NM PET/CT Skull Base to Mid Thigh (06/20/2023 11:12)         CT Chest Low Dose Cancer Screening WO (02/14/2023 12:58)   IMPRESSION:    Interval development or enlargement of a 1.9 cm spiculated right upper  lobe pulmonary nodule concerning in appearance for malignancy and PET/CT  is recommended for further evaluation.        Pathology:     Tissue Pathology Exam (09/06/2023 08:13)         6/29/23           Assessment / Plan         Assessment and Plan   Mayra Hays is a 78 y.o. year old presents for       Non small cell lung cancer   Cancer Staging   Stage IIIA (cT1c, cN2, cM0)  -Oncology history as above. Patient with 1.9 cm spiculated right upper lobe nodule on low dose CT chest screen (2/2023). EBUS by Dr. Peter negative for malignancy (2/2023). PET/CT with hypermetabolism RUL 2.1 cm and pretracheal region lymph node (6/2023). CT guided biopsy negative for malignancy (6/2023). Navigational bronchoscopy with positive RUL and  4R (paratracheal) for squamous cell (9/6/2023). PD-L1 TPS score 60%.   -Patient not deemed for surgical resection. We reviewed her staging and treatment options which include concurrent chemo-radiation followed by adjuvant immunotherapy. Chemotherapy agents to be used would include weekly carboplatin AUC2 and paclitaxel 50 mg/m2. Chemo will be followed by one year of Durvalumab based on PACIFIC trial.   -Patient experienced anaphylaxis 8 minutes into the paclitaxel on first cycle 10/9. Due to this, paclitaxel will be omitted from weekly chemotherapy.  -Radiation therapy started 10/10; 6000 cGy in 30 treatment fractions. Complete 11/21/2023  -Weekly carboplatin with concurrent radiation 10/18-11/21/2023  -Ordered for post treatment CT of the chest in 4 weeks      2. Anemia; suspicious for iron deficiency anemia due to GIB  - Patient with normal H/H in our system 2/2023, with acute drop in Hgb 6/29 (10.7) to Hgb (6.5) on 8/9 requiring transfusion.  Patient reports one episode of melena two days ago (she is a poor historian).   -Last colonoscopy and endoscopy are unknown; think it may have been >5 years ago  -CBC from 8/14 show Hgb 8.2, Hct 27, MCV 90. Normal WBC and platelet count. Iron studies with normal iron (37), TIBC (435), and low iron saturation (9). This is in light of recent blood transfusion. Normal folate and B12 level. Reticulocyte count noted to be elevated to 6%  -Patient is with fatigue. Denies shortness of breath (aside from baseline COPD) or chest pain. Continues to be on Eliquis for Afib  -Initial work up from consultation confirmed iron deficiency anemia, normal folate/b12 levels. No indication of hemolysis seen with normal LDH and haptoglobin. Myeloma work up has been negative with no M spike on serum protein electrophoresis and normal k/l free light chain ratio. Peripheral smear with normal total WBC without granulocytic dysplasia or blasts.  -Received Ferumoxytol 8/29/2023. Will repeat parenteral iron due to persistent anemia due to iron deficiency   -Pt to see surgery post treatment for repeat EGD and Colonoscopy     3. Malignancy associated pain  - Currently denies        Discussed possible differential diagnoses, testing, treatment, recommended non-pharmacological interventions, risks, warning signs to monitor for that would indicate need for follow-up in clinic or ER. If no improvement with these regimens or you have new or worsening symptoms follow-up. Patient verbalizes understanding and agreement with plan of care. Denies further needs or concerns.     Patient was given instructions and counseling regarding her condition and for health maintenance advised.       All questions were answered to her satisfaction.              Meds ordered during this visit  No orders of the defined types were placed in this encounter.      Visit Diagnoses    ICD-10-CM ICD-9-CM   1. Non-small cell cancer of right lung  C34.91 162.9   2. Malabsorption  due to intolerance, not elsewhere classified  K90.49 579.8   3. Iron deficiency anemia, unspecified iron deficiency anemia type  D50.9 280.9             Follow Up   In 1 month with a follow up CT of the chest, this will be followed by maintenance durvalumab        This document has been electronically signed by Shawna Bond MD   November 21, 2023 13:56 EST    Dictated Utilizing Dragon Dictation: Part of this note may be an electronic transcription/translation of spoken language to printed text using the Dragon Dictation System.

## 2023-11-22 LAB
RAD ONC ARIA COURSE END DATE: NORMAL
RAD ONC ARIA COURSE ID: NORMAL
RAD ONC ARIA COURSE INTENT: NORMAL
RAD ONC ARIA COURSE LAST TREATMENT DATE: NORMAL
RAD ONC ARIA COURSE START DATE: NORMAL
RAD ONC ARIA COURSE TREATMENT ELAPSED DAYS: 42
RAD ONC ARIA FIRST TREATMENT DATE: NORMAL
RAD ONC ARIA PLAN FRACTIONS TREATED TO DATE: 30
RAD ONC ARIA PLAN ID: NORMAL
RAD ONC ARIA PLAN NAME: NORMAL
RAD ONC ARIA PLAN PRESCRIBED DOSE PER FRACTION: 2 GY
RAD ONC ARIA PLAN PRIMARY REFERENCE POINT: NORMAL
RAD ONC ARIA PLAN TOTAL FRACTIONS PRESCRIBED: 30
RAD ONC ARIA PLAN TOTAL PRESCRIBED DOSE: 6000 CGY
RAD ONC ARIA REFERENCE POINT DOSAGE GIVEN TO DATE: 60 GY
RAD ONC ARIA REFERENCE POINT ID: NORMAL

## 2023-12-05 ENCOUNTER — INFUSION (OUTPATIENT)
Dept: ONCOLOGY | Facility: HOSPITAL | Age: 78
End: 2023-12-05
Payer: MEDICARE

## 2023-12-05 VITALS
RESPIRATION RATE: 18 BRPM | TEMPERATURE: 97.7 F | OXYGEN SATURATION: 94 % | HEART RATE: 71 BPM | DIASTOLIC BLOOD PRESSURE: 75 MMHG | SYSTOLIC BLOOD PRESSURE: 170 MMHG

## 2023-12-05 DIAGNOSIS — Z95.828 PORT-A-CATH IN PLACE: ICD-10-CM

## 2023-12-05 DIAGNOSIS — K90.49 MALABSORPTION DUE TO INTOLERANCE, NOT ELSEWHERE CLASSIFIED: Primary | ICD-10-CM

## 2023-12-05 DIAGNOSIS — D50.9 IRON DEFICIENCY ANEMIA, UNSPECIFIED IRON DEFICIENCY ANEMIA TYPE: ICD-10-CM

## 2023-12-05 PROCEDURE — 25810000003 SODIUM CHLORIDE 0.9 % SOLUTION: Performed by: NURSE PRACTITIONER

## 2023-12-05 PROCEDURE — 25010000002 FERUMOXYTOL 510 MG/17ML SOLUTION: Performed by: NURSE PRACTITIONER

## 2023-12-05 PROCEDURE — 96365 THER/PROPH/DIAG IV INF INIT: CPT

## 2023-12-05 PROCEDURE — 96374 THER/PROPH/DIAG INJ IV PUSH: CPT

## 2023-12-05 PROCEDURE — 25010000002 HEPARIN LOCK FLUSH PER 10 UNITS: Performed by: NURSE PRACTITIONER

## 2023-12-05 RX ORDER — SODIUM CHLORIDE 9 MG/ML
20 INJECTION, SOLUTION INTRAVENOUS ONCE
Status: COMPLETED | OUTPATIENT
Start: 2023-12-05 | End: 2023-12-05

## 2023-12-05 RX ORDER — HEPARIN SODIUM (PORCINE) LOCK FLUSH IV SOLN 100 UNIT/ML 100 UNIT/ML
500 SOLUTION INTRAVENOUS AS NEEDED
Status: DISCONTINUED | OUTPATIENT
Start: 2023-12-05 | End: 2023-12-05 | Stop reason: HOSPADM

## 2023-12-05 RX ORDER — SODIUM CHLORIDE 9 MG/ML
20 INJECTION, SOLUTION INTRAVENOUS ONCE
Status: CANCELLED | OUTPATIENT
Start: 2023-12-11

## 2023-12-05 RX ORDER — SODIUM CHLORIDE 0.9 % (FLUSH) 0.9 %
10 SYRINGE (ML) INJECTION AS NEEDED
Status: CANCELLED | OUTPATIENT
Start: 2023-12-11

## 2023-12-05 RX ORDER — HEPARIN SODIUM (PORCINE) LOCK FLUSH IV SOLN 100 UNIT/ML 100 UNIT/ML
500 SOLUTION INTRAVENOUS AS NEEDED
Status: CANCELLED | OUTPATIENT
Start: 2023-12-11

## 2023-12-05 RX ORDER — HEPARIN SODIUM (PORCINE) LOCK FLUSH IV SOLN 100 UNIT/ML 100 UNIT/ML
300 SOLUTION INTRAVENOUS ONCE
Status: CANCELLED | OUTPATIENT
Start: 2023-12-05

## 2023-12-05 RX ORDER — SODIUM CHLORIDE 0.9 % (FLUSH) 0.9 %
10 SYRINGE (ML) INJECTION AS NEEDED
Status: DISCONTINUED | OUTPATIENT
Start: 2023-12-05 | End: 2023-12-05 | Stop reason: HOSPADM

## 2023-12-05 RX ORDER — SODIUM CHLORIDE 0.9 % (FLUSH) 0.9 %
20 SYRINGE (ML) INJECTION AS NEEDED
Status: CANCELLED | OUTPATIENT
Start: 2023-12-05

## 2023-12-05 RX ADMIN — FERUMOXYTOL 510 MG: 510 INJECTION INTRAVENOUS at 14:52

## 2023-12-05 RX ADMIN — SODIUM CHLORIDE 20 ML/HR: 9 INJECTION, SOLUTION INTRAVENOUS at 14:52

## 2023-12-05 RX ADMIN — Medication 500 UNITS: at 15:38

## 2023-12-05 RX ADMIN — Medication 10 ML: at 15:38

## 2023-12-11 ENCOUNTER — INFUSION (OUTPATIENT)
Dept: ONCOLOGY | Facility: HOSPITAL | Age: 78
End: 2023-12-11
Payer: MEDICARE

## 2023-12-11 VITALS
OXYGEN SATURATION: 97 % | HEART RATE: 74 BPM | DIASTOLIC BLOOD PRESSURE: 76 MMHG | TEMPERATURE: 97.9 F | RESPIRATION RATE: 18 BRPM | SYSTOLIC BLOOD PRESSURE: 136 MMHG

## 2023-12-11 DIAGNOSIS — Z95.828 PORT-A-CATH IN PLACE: ICD-10-CM

## 2023-12-11 DIAGNOSIS — K90.49 MALABSORPTION DUE TO INTOLERANCE, NOT ELSEWHERE CLASSIFIED: ICD-10-CM

## 2023-12-11 DIAGNOSIS — D50.9 IRON DEFICIENCY ANEMIA, UNSPECIFIED IRON DEFICIENCY ANEMIA TYPE: Primary | ICD-10-CM

## 2023-12-11 PROCEDURE — 96374 THER/PROPH/DIAG INJ IV PUSH: CPT

## 2023-12-11 PROCEDURE — 25010000002 FERUMOXYTOL 510 MG/17ML SOLUTION: Performed by: NURSE PRACTITIONER

## 2023-12-11 PROCEDURE — 96365 THER/PROPH/DIAG IV INF INIT: CPT

## 2023-12-11 PROCEDURE — 25810000003 SODIUM CHLORIDE 0.9 % SOLUTION: Performed by: NURSE PRACTITIONER

## 2023-12-11 PROCEDURE — 25010000002 HEPARIN LOCK FLUSH PER 10 UNITS: Performed by: NURSE PRACTITIONER

## 2023-12-11 RX ORDER — SODIUM CHLORIDE 0.9 % (FLUSH) 0.9 %
10 SYRINGE (ML) INJECTION AS NEEDED
OUTPATIENT
Start: 2023-12-11

## 2023-12-11 RX ORDER — HEPARIN SODIUM (PORCINE) LOCK FLUSH IV SOLN 100 UNIT/ML 100 UNIT/ML
300 SOLUTION INTRAVENOUS ONCE
Status: CANCELLED | OUTPATIENT
Start: 2023-12-11

## 2023-12-11 RX ORDER — HEPARIN SODIUM (PORCINE) LOCK FLUSH IV SOLN 100 UNIT/ML 100 UNIT/ML
500 SOLUTION INTRAVENOUS AS NEEDED
Status: DISCONTINUED | OUTPATIENT
Start: 2023-12-11 | End: 2023-12-11 | Stop reason: HOSPADM

## 2023-12-11 RX ORDER — SODIUM CHLORIDE 0.9 % (FLUSH) 0.9 %
20 SYRINGE (ML) INJECTION AS NEEDED
Status: CANCELLED | OUTPATIENT
Start: 2023-12-11

## 2023-12-11 RX ORDER — HEPARIN SODIUM (PORCINE) LOCK FLUSH IV SOLN 100 UNIT/ML 100 UNIT/ML
500 SOLUTION INTRAVENOUS AS NEEDED
OUTPATIENT
Start: 2023-12-11

## 2023-12-11 RX ORDER — HEPARIN SODIUM (PORCINE) LOCK FLUSH IV SOLN 100 UNIT/ML 100 UNIT/ML
300 SOLUTION INTRAVENOUS ONCE
OUTPATIENT
Start: 2023-12-11

## 2023-12-11 RX ORDER — SODIUM CHLORIDE 0.9 % (FLUSH) 0.9 %
20 SYRINGE (ML) INJECTION AS NEEDED
OUTPATIENT
Start: 2023-12-11

## 2023-12-11 RX ORDER — SODIUM CHLORIDE 0.9 % (FLUSH) 0.9 %
10 SYRINGE (ML) INJECTION AS NEEDED
Status: DISCONTINUED | OUTPATIENT
Start: 2023-12-11 | End: 2023-12-11 | Stop reason: HOSPADM

## 2023-12-11 RX ORDER — SODIUM CHLORIDE 9 MG/ML
20 INJECTION, SOLUTION INTRAVENOUS ONCE
Status: COMPLETED | OUTPATIENT
Start: 2023-12-11 | End: 2023-12-11

## 2023-12-11 RX ADMIN — Medication 500 UNITS: at 14:59

## 2023-12-11 RX ADMIN — FERUMOXYTOL 510 MG: 510 INJECTION INTRAVENOUS at 14:16

## 2023-12-11 RX ADMIN — Medication 10 ML: at 14:59

## 2023-12-11 RX ADMIN — SODIUM CHLORIDE 20 ML/HR: 9 INJECTION, SOLUTION INTRAVENOUS at 14:16

## 2023-12-13 DIAGNOSIS — K90.49 MALABSORPTION DUE TO INTOLERANCE, NOT ELSEWHERE CLASSIFIED: ICD-10-CM

## 2023-12-13 DIAGNOSIS — C34.91 NON-SMALL CELL CANCER OF RIGHT LUNG: ICD-10-CM

## 2023-12-13 DIAGNOSIS — D64.9 ACUTE ANEMIA: ICD-10-CM

## 2023-12-13 DIAGNOSIS — C34.11 PRIMARY CANCER OF RIGHT UPPER LOBE OF LUNG: ICD-10-CM

## 2023-12-13 DIAGNOSIS — D50.9 IRON DEFICIENCY ANEMIA, UNSPECIFIED IRON DEFICIENCY ANEMIA TYPE: Primary | ICD-10-CM

## 2023-12-14 ENCOUNTER — PATIENT OUTREACH (OUTPATIENT)
Dept: ONCOLOGY | Facility: CLINIC | Age: 78
End: 2023-12-14
Payer: MEDICARE

## 2023-12-19 ENCOUNTER — HOSPITAL ENCOUNTER (OUTPATIENT)
Dept: RADIATION ONCOLOGY | Facility: HOSPITAL | Age: 78
Discharge: HOME | End: 2023-12-19
Admitting: INTERNAL MEDICINE
Payer: MEDICARE

## 2023-12-19 DIAGNOSIS — C34.91 NON-SMALL CELL CANCER OF RIGHT LUNG: ICD-10-CM

## 2023-12-19 PROCEDURE — 25510000001 IOPAMIDOL 61 % SOLUTION: Performed by: INTERNAL MEDICINE

## 2023-12-19 PROCEDURE — 71260 CT THORAX DX C+: CPT

## 2023-12-19 RX ADMIN — IOPAMIDOL 48 ML: 612 INJECTION, SOLUTION INTRAVENOUS at 14:48

## 2023-12-21 ENCOUNTER — OFFICE VISIT (OUTPATIENT)
Dept: RADIATION ONCOLOGY | Facility: HOSPITAL | Age: 78
End: 2023-12-21
Payer: MEDICARE

## 2023-12-21 VITALS
RESPIRATION RATE: 18 BRPM | SYSTOLIC BLOOD PRESSURE: 147 MMHG | DIASTOLIC BLOOD PRESSURE: 76 MMHG | TEMPERATURE: 97.4 F | BODY MASS INDEX: 31.27 KG/M2 | HEART RATE: 67 BPM | OXYGEN SATURATION: 98 % | WEIGHT: 224.2 LBS

## 2023-12-21 DIAGNOSIS — C34.91 NON-SMALL CELL CANCER OF RIGHT LUNG: Primary | ICD-10-CM

## 2023-12-21 PROCEDURE — G0463 HOSPITAL OUTPT CLINIC VISIT: HCPCS | Performed by: RADIOLOGY

## 2023-12-21 NOTE — PROGRESS NOTES
FOLLOW UP NOTE    PATIENT:                                                      Mayra Hays  MEDICAL RECORD #:                        [id number]  :                                                          1945  COMPLETION DATE:   2023  DIAGNOSIS:     Carcinoma of the left lung.  CANCER STAGING:     Cancer Staging   Primary cancer of right upper lobe of lung  Staging form: Lung, AJCC 8th Edition  - Clinical stage from 2023: Stage IIIA (cT1c, cN2, cM0) - Signed by Shawna Bond MD on 2023        BRIEF HISTORY: Mrs. Hays is a pleasant 28-year-old female who had a workup for a right upper lobe nodule on 9 as at that time she was noted to have a decrease in her hemoglobin to 10.7 from normal in 2023.  On 2023 she had a low-dose CT screening and was noted to have a 1.9 cm spiculated right upper lobe pulmonary nodule concerning for malignancy a EBUS was performed on 2023 that was negative for malignancy on 2023 she had a PET/CT that showed the right upper lobe pulmonary nodule, more possible satellite nodules are postobstructive process measuring 2.1 cm with a maximum SUV of 14.5 also there was a pretracheal region lymph node 2.6 cm.  She had a CT-guided needle biopsy that was nondiagnostic in  she had navigational bronchoscopy in 2023 that showed a squamous cell carcinoma, lymph node at station 4R involve a squamous cell carcinoma in 2023.  Patient was treated with carbo and concurrent radiation and received 6000 cGy completed on 2023.    Patient returns today approximately 1 month after having completed her course of therapy.  She states that she is doing fairly well she has some mild back and left side pain that she says was told with possibly her kidney and to drink more fluids.  She has breathing treatment machine at home and uses 1 to 2 L as needed she has no other complaints.    MEDICATIONS: Medication reconciliation for the  patient was reviewed and confirmed in the electronic medical record.    Review of Systems   Musculoskeletal:         She has some occasional pain in her left back and side.   All other systems reviewed and are negative.      KPS 50:  Requires assistance, frequent medical care      Physical Exam  Vitals and nursing note reviewed. Exam conducted with a chaperone present.   Eyes:      Extraocular Movements: Extraocular movements intact.      Pupils: Pupils are equal, round, and reactive to light.   Cardiovascular:      Rate and Rhythm: Normal rate and regular rhythm.   Pulmonary:      Effort: Pulmonary effort is normal.      Breath sounds: Normal breath sounds.   Abdominal:      General: Abdomen is flat.   Musculoskeletal:         General: Normal range of motion.      Cervical back: Normal range of motion and neck supple.   Skin:     General: Skin is warm.   Neurological:      General: No focal deficit present.      Mental Status: She is alert and oriented to person, place, and time.   Psychiatric:         Mood and Affect: Mood normal.         Behavior: Behavior normal.         VITAL SIGNS:   Vitals:    12/21/23 1445   BP: 147/76   Pulse: 67   Resp: 18   Temp: 97.4 °F (36.3 °C)   TempSrc: Temporal   SpO2: 98%   Weight: 102 kg (224 lb 3.2 oz)   PainSc: 0-No pain       Mayra Hays reports a pain score of 0.  Given her pain assessment as noted, treatment options were discussed and the following options were decided upon as a follow-up plan to address the patient's pain:        The following portions of the patient's history were reviewed and updated as appropriate: allergies, current medications, past family history, past medical history, past social history, past surgical history and problem list.         Non-small cell cancer of right lung [C34.91]    IMPRESSION: The patient is recovering well from her concurrent chemoradiation for her non-small cell carcinoma of the lung.  I have recommended she see another physician  for her left back and flank pain.    RECOMMENDATIONS: We will plan to see her back for routine follow-up in 3 months         Fifi Fortune MD

## 2023-12-26 ENCOUNTER — LAB (OUTPATIENT)
Dept: ONCOLOGY | Facility: CLINIC | Age: 78
End: 2023-12-26
Payer: MEDICARE

## 2023-12-26 VITALS
HEART RATE: 71 BPM | TEMPERATURE: 97.1 F | OXYGEN SATURATION: 93 % | BODY MASS INDEX: 30.63 KG/M2 | SYSTOLIC BLOOD PRESSURE: 148 MMHG | DIASTOLIC BLOOD PRESSURE: 71 MMHG | WEIGHT: 219.6 LBS | RESPIRATION RATE: 18 BRPM

## 2023-12-26 DIAGNOSIS — D64.9 ACUTE ANEMIA: ICD-10-CM

## 2023-12-26 DIAGNOSIS — D50.9 IRON DEFICIENCY ANEMIA, UNSPECIFIED IRON DEFICIENCY ANEMIA TYPE: ICD-10-CM

## 2023-12-26 DIAGNOSIS — K90.49 MALABSORPTION DUE TO INTOLERANCE, NOT ELSEWHERE CLASSIFIED: ICD-10-CM

## 2023-12-26 DIAGNOSIS — C34.91 NON-SMALL CELL CANCER OF RIGHT LUNG: ICD-10-CM

## 2023-12-26 DIAGNOSIS — C34.11 PRIMARY CANCER OF RIGHT UPPER LOBE OF LUNG: ICD-10-CM

## 2023-12-26 LAB
ALBUMIN SERPL-MCNC: 3.9 G/DL (ref 3.5–5.2)
ALBUMIN/GLOB SERPL: 1.1 G/DL
ALP SERPL-CCNC: 133 U/L (ref 39–117)
ALT SERPL W P-5'-P-CCNC: 9 U/L (ref 1–33)
ANION GAP SERPL CALCULATED.3IONS-SCNC: 10.5 MMOL/L (ref 5–15)
AST SERPL-CCNC: 11 U/L (ref 1–32)
BASOPHILS # BLD AUTO: 0.04 10*3/MM3 (ref 0–0.2)
BASOPHILS NFR BLD AUTO: 0.4 % (ref 0–1.5)
BILIRUB SERPL-MCNC: 0.5 MG/DL (ref 0–1.2)
BUN SERPL-MCNC: 15 MG/DL (ref 8–23)
BUN/CREAT SERPL: 15.5 (ref 7–25)
CALCIUM SPEC-SCNC: 9 MG/DL (ref 8.6–10.5)
CHLORIDE SERPL-SCNC: 102 MMOL/L (ref 98–107)
CO2 SERPL-SCNC: 27.5 MMOL/L (ref 22–29)
CREAT SERPL-MCNC: 0.97 MG/DL (ref 0.57–1)
DEPRECATED RDW RBC AUTO: 55.8 FL (ref 37–54)
EGFRCR SERPLBLD CKD-EPI 2021: 60.3 ML/MIN/1.73
EOSINOPHIL # BLD AUTO: 0.13 10*3/MM3 (ref 0–0.4)
EOSINOPHIL NFR BLD AUTO: 1.5 % (ref 0.3–6.2)
ERYTHROCYTE [DISTWIDTH] IN BLOOD BY AUTOMATED COUNT: 17.4 % (ref 12.3–15.4)
FERRITIN SERPL-MCNC: 539.9 NG/ML (ref 13–150)
GLOBULIN UR ELPH-MCNC: 3.4 GM/DL
GLUCOSE SERPL-MCNC: 141 MG/DL (ref 65–99)
HCT VFR BLD AUTO: 37.8 % (ref 34–46.6)
HGB BLD-MCNC: 11.9 G/DL (ref 12–15.9)
IMM GRANULOCYTES # BLD AUTO: 0.04 10*3/MM3 (ref 0–0.05)
IMM GRANULOCYTES NFR BLD AUTO: 0.4 % (ref 0–0.5)
IRON 24H UR-MRATE: 42 MCG/DL (ref 37–145)
IRON SATN MFR SERPL: 13 % (ref 20–50)
LYMPHOCYTES # BLD AUTO: 0.78 10*3/MM3 (ref 0.7–3.1)
LYMPHOCYTES NFR BLD AUTO: 8.7 % (ref 19.6–45.3)
MCH RBC QN AUTO: 27.2 PG (ref 26.6–33)
MCHC RBC AUTO-ENTMCNC: 31.5 G/DL (ref 31.5–35.7)
MCV RBC AUTO: 86.5 FL (ref 79–97)
MONOCYTES # BLD AUTO: 0.92 10*3/MM3 (ref 0.1–0.9)
MONOCYTES NFR BLD AUTO: 10.3 % (ref 5–12)
NEUTROPHILS NFR BLD AUTO: 7.03 10*3/MM3 (ref 1.7–7)
NEUTROPHILS NFR BLD AUTO: 78.7 % (ref 42.7–76)
NRBC BLD AUTO-RTO: 0 /100 WBC (ref 0–0.2)
PLATELET # BLD AUTO: 168 10*3/MM3 (ref 140–450)
PMV BLD AUTO: 10.6 FL (ref 6–12)
POTASSIUM SERPL-SCNC: 3.8 MMOL/L (ref 3.5–5.2)
PROT SERPL-MCNC: 7.3 G/DL (ref 6–8.5)
RBC # BLD AUTO: 4.37 10*6/MM3 (ref 3.77–5.28)
SODIUM SERPL-SCNC: 140 MMOL/L (ref 136–145)
TIBC SERPL-MCNC: 334 MCG/DL (ref 298–536)
TRANSFERRIN SERPL-MCNC: 224 MG/DL (ref 200–360)
WBC NRBC COR # BLD AUTO: 8.94 10*3/MM3 (ref 3.4–10.8)

## 2023-12-26 PROCEDURE — 82728 ASSAY OF FERRITIN: CPT | Performed by: INTERNAL MEDICINE

## 2023-12-26 PROCEDURE — 84466 ASSAY OF TRANSFERRIN: CPT | Performed by: INTERNAL MEDICINE

## 2023-12-26 PROCEDURE — 83540 ASSAY OF IRON: CPT | Performed by: INTERNAL MEDICINE

## 2023-12-26 PROCEDURE — 80053 COMPREHEN METABOLIC PANEL: CPT | Performed by: INTERNAL MEDICINE

## 2023-12-26 PROCEDURE — 85025 COMPLETE CBC W/AUTO DIFF WBC: CPT | Performed by: INTERNAL MEDICINE

## 2023-12-26 NOTE — PROGRESS NOTES
Venipuncture Blood Specimen Collection  Venipuncture performed in right arm by Fe Villanueva MA with good hemostasis. Patient tolerated the procedure well without complications.   12/26/23   Fe Villanueva MA

## 2023-12-28 DIAGNOSIS — D50.9 IRON DEFICIENCY ANEMIA, UNSPECIFIED IRON DEFICIENCY ANEMIA TYPE: Primary | ICD-10-CM

## 2023-12-28 DIAGNOSIS — K90.49 MALABSORPTION DUE TO INTOLERANCE, NOT ELSEWHERE CLASSIFIED: ICD-10-CM

## 2023-12-28 DIAGNOSIS — C34.11 PRIMARY CANCER OF RIGHT UPPER LOBE OF LUNG: ICD-10-CM

## 2023-12-31 ENCOUNTER — HOSPITAL ENCOUNTER (INPATIENT)
Facility: HOSPITAL | Age: 78
LOS: 3 days | Discharge: HOME HEALTH | End: 2024-01-04
Attending: STUDENT IN AN ORGANIZED HEALTH CARE EDUCATION/TRAINING PROGRAM | Admitting: INTERNAL MEDICINE
Payer: MEDICARE

## 2023-12-31 ENCOUNTER — APPOINTMENT (OUTPATIENT)
Dept: CT IMAGING | Facility: HOSPITAL | Age: 78
End: 2023-12-31
Payer: MEDICARE

## 2023-12-31 ENCOUNTER — APPOINTMENT (OUTPATIENT)
Dept: GENERAL RADIOLOGY | Facility: HOSPITAL | Age: 78
End: 2023-12-31
Payer: MEDICARE

## 2023-12-31 DIAGNOSIS — A41.9 SEVERE SEPSIS: ICD-10-CM

## 2023-12-31 DIAGNOSIS — N30.01 ACUTE CYSTITIS WITH HEMATURIA: ICD-10-CM

## 2023-12-31 DIAGNOSIS — C34.91 NON-SMALL CELL CANCER OF RIGHT LUNG: ICD-10-CM

## 2023-12-31 DIAGNOSIS — B99.9 CONFUSION ASSOCIATED WITH INFECTION: ICD-10-CM

## 2023-12-31 DIAGNOSIS — J18.9 HEALTHCARE-ASSOCIATED PNEUMONIA: Primary | ICD-10-CM

## 2023-12-31 DIAGNOSIS — R65.20 SEVERE SEPSIS: ICD-10-CM

## 2023-12-31 DIAGNOSIS — E11.9 TYPE 2 DIABETES MELLITUS WITHOUT COMPLICATION, WITHOUT LONG-TERM CURRENT USE OF INSULIN: ICD-10-CM

## 2023-12-31 DIAGNOSIS — J96.11 CHRONIC RESPIRATORY FAILURE WITH HYPOXIA: ICD-10-CM

## 2023-12-31 DIAGNOSIS — J44.9 CHRONIC OBSTRUCTIVE PULMONARY DISEASE, UNSPECIFIED COPD TYPE: ICD-10-CM

## 2023-12-31 DIAGNOSIS — R41.0 CONFUSION ASSOCIATED WITH INFECTION: ICD-10-CM

## 2023-12-31 DIAGNOSIS — B34.2 CORONAVIRUS INFECTION: ICD-10-CM

## 2023-12-31 LAB
A-A DO2: 39.8 MMHG (ref 0–300)
ALBUMIN SERPL-MCNC: 3.4 G/DL (ref 3.5–5.2)
ALBUMIN/GLOB SERPL: 0.8 G/DL
ALP SERPL-CCNC: 157 U/L (ref 39–117)
ALT SERPL W P-5'-P-CCNC: 12 U/L (ref 1–33)
ANION GAP SERPL CALCULATED.3IONS-SCNC: 16.6 MMOL/L (ref 5–15)
ARTERIAL PATENCY WRIST A: POSITIVE
AST SERPL-CCNC: 18 U/L (ref 1–32)
ATMOSPHERIC PRESS: 725 MMHG
B PARAPERT DNA SPEC QL NAA+PROBE: NOT DETECTED
B PERT DNA SPEC QL NAA+PROBE: NOT DETECTED
BACTERIA UR QL AUTO: ABNORMAL /HPF
BASE EXCESS BLDA CALC-SCNC: -1.7 MMOL/L (ref 0–2)
BASOPHILS # BLD AUTO: 0.09 10*3/MM3 (ref 0–0.2)
BASOPHILS NFR BLD AUTO: 0.7 % (ref 0–1.5)
BDY SITE: ABNORMAL
BILIRUB SERPL-MCNC: 0.4 MG/DL (ref 0–1.2)
BUN SERPL-MCNC: 27 MG/DL (ref 8–23)
BUN/CREAT SERPL: 18 (ref 7–25)
C PNEUM DNA NPH QL NAA+NON-PROBE: NOT DETECTED
CALCIUM SPEC-SCNC: 9.3 MG/DL (ref 8.6–10.5)
CHLORIDE SERPL-SCNC: 99 MMOL/L (ref 98–107)
CO2 BLDA-SCNC: 23.3 MMOL/L (ref 22–33)
CO2 SERPL-SCNC: 21.4 MMOL/L (ref 22–29)
COHGB MFR BLD: 1.3 % (ref 0–5)
CREAT SERPL-MCNC: 1.5 MG/DL (ref 0.57–1)
CRP SERPL-MCNC: 32.78 MG/DL (ref 0–0.5)
D-LACTATE SERPL-SCNC: 1.4 MMOL/L (ref 0.5–2)
DEPRECATED RDW RBC AUTO: 57 FL (ref 37–54)
EGFRCR SERPLBLD CKD-EPI 2021: 35.7 ML/MIN/1.73
EOSINOPHIL # BLD AUTO: 0.19 10*3/MM3 (ref 0–0.4)
EOSINOPHIL NFR BLD AUTO: 1.5 % (ref 0.3–6.2)
ERYTHROCYTE [DISTWIDTH] IN BLOOD BY AUTOMATED COUNT: 18 % (ref 12.3–15.4)
ERYTHROCYTE [SEDIMENTATION RATE] IN BLOOD: >130 MM/HR (ref 0–30)
FLUAV SUBTYP SPEC NAA+PROBE: NOT DETECTED
FLUBV RNA ISLT QL NAA+PROBE: NOT DETECTED
GLOBULIN UR ELPH-MCNC: 4.3 GM/DL
GLUCOSE SERPL-MCNC: 245 MG/DL (ref 65–99)
HADV DNA SPEC NAA+PROBE: NOT DETECTED
HCO3 BLDA-SCNC: 22.2 MMOL/L (ref 20–26)
HCOV 229E RNA SPEC QL NAA+PROBE: NOT DETECTED
HCOV HKU1 RNA SPEC QL NAA+PROBE: DETECTED
HCOV NL63 RNA SPEC QL NAA+PROBE: NOT DETECTED
HCOV OC43 RNA SPEC QL NAA+PROBE: NOT DETECTED
HCT VFR BLD AUTO: 36.9 % (ref 34–46.6)
HCT VFR BLD CALC: 35.5 % (ref 38–51)
HGB BLD-MCNC: 11.6 G/DL (ref 12–15.9)
HGB BLDA-MCNC: 11.6 G/DL (ref 13.5–17.5)
HMPV RNA NPH QL NAA+NON-PROBE: NOT DETECTED
HOLD SPECIMEN: NORMAL
HOLD SPECIMEN: NORMAL
HPIV1 RNA ISLT QL NAA+PROBE: NOT DETECTED
HPIV2 RNA SPEC QL NAA+PROBE: NOT DETECTED
HPIV3 RNA NPH QL NAA+PROBE: NOT DETECTED
HPIV4 P GENE NPH QL NAA+PROBE: NOT DETECTED
HYALINE CASTS UR QL AUTO: ABNORMAL /LPF
IMM GRANULOCYTES # BLD AUTO: 0.27 10*3/MM3 (ref 0–0.05)
IMM GRANULOCYTES NFR BLD AUTO: 2.2 % (ref 0–0.5)
INHALED O2 CONCENTRATION: 21 %
LYMPHOCYTES # BLD AUTO: 0.74 10*3/MM3 (ref 0.7–3.1)
LYMPHOCYTES NFR BLD AUTO: 6 % (ref 19.6–45.3)
Lab: ABNORMAL
M PNEUMO IGG SER IA-ACNC: NOT DETECTED
MCH RBC QN AUTO: 27.2 PG (ref 26.6–33)
MCHC RBC AUTO-ENTMCNC: 31.4 G/DL (ref 31.5–35.7)
MCV RBC AUTO: 86.6 FL (ref 79–97)
METHGB BLD QL: 0 % (ref 0–3)
MODALITY: ABNORMAL
MONOCYTES # BLD AUTO: 1.13 10*3/MM3 (ref 0.1–0.9)
MONOCYTES NFR BLD AUTO: 9.1 % (ref 5–12)
NEUTROPHILS NFR BLD AUTO: 10 10*3/MM3 (ref 1.7–7)
NEUTROPHILS NFR BLD AUTO: 80.5 % (ref 42.7–76)
NOTE: ABNORMAL
NOTIFIED BY: ABNORMAL
NOTIFIED WHO: ABNORMAL
NRBC BLD AUTO-RTO: 0 /100 WBC (ref 0–0.2)
NT-PROBNP SERPL-MCNC: 2015 PG/ML (ref 0–1800)
OXYHGB MFR BLDV: 91 % (ref 94–99)
PCO2 BLDA: 34 MM HG (ref 35–45)
PCO2 TEMP ADJ BLD: ABNORMAL MM[HG]
PH BLDA: 7.42 PH UNITS (ref 7.35–7.45)
PH, TEMP CORRECTED: ABNORMAL
PLATELET # BLD AUTO: 255 10*3/MM3 (ref 140–450)
PMV BLD AUTO: 11.2 FL (ref 6–12)
PO2 BLDA: 64.2 MM HG (ref 83–108)
PO2 TEMP ADJ BLD: ABNORMAL MM[HG]
POTASSIUM SERPL-SCNC: 3.5 MMOL/L (ref 3.5–5.2)
PROCALCITONIN SERPL-MCNC: 0.64 NG/ML (ref 0–0.25)
PROT SERPL-MCNC: 7.7 G/DL (ref 6–8.5)
RBC # BLD AUTO: 4.26 10*6/MM3 (ref 3.77–5.28)
RBC # UR STRIP: ABNORMAL /HPF
REF LAB TEST METHOD: ABNORMAL
RHINOVIRUS RNA SPEC NAA+PROBE: NOT DETECTED
RSV RNA NPH QL NAA+NON-PROBE: NOT DETECTED
SAO2 % BLDCOA: 92.2 % (ref 94–99)
SARS-COV-2 RNA NPH QL NAA+NON-PROBE: NOT DETECTED
SODIUM SERPL-SCNC: 137 MMOL/L (ref 136–145)
SQUAMOUS #/AREA URNS HPF: ABNORMAL /HPF
TROPONIN T SERPL HS-MCNC: 21 NG/L
VENTILATOR MODE: ABNORMAL
WBC # UR STRIP: ABNORMAL /HPF
WBC NRBC COR # BLD AUTO: 12.42 10*3/MM3 (ref 3.4–10.8)
WHOLE BLOOD HOLD COAG: NORMAL
WHOLE BLOOD HOLD SPECIMEN: NORMAL

## 2023-12-31 PROCEDURE — 82375 ASSAY CARBOXYHB QUANT: CPT

## 2023-12-31 PROCEDURE — 36415 COLL VENOUS BLD VENIPUNCTURE: CPT

## 2023-12-31 PROCEDURE — 71045 X-RAY EXAM CHEST 1 VIEW: CPT

## 2023-12-31 PROCEDURE — 85025 COMPLETE CBC W/AUTO DIFF WBC: CPT | Performed by: STUDENT IN AN ORGANIZED HEALTH CARE EDUCATION/TRAINING PROGRAM

## 2023-12-31 PROCEDURE — 82805 BLOOD GASES W/O2 SATURATION: CPT

## 2023-12-31 PROCEDURE — 83605 ASSAY OF LACTIC ACID: CPT | Performed by: STUDENT IN AN ORGANIZED HEALTH CARE EDUCATION/TRAINING PROGRAM

## 2023-12-31 PROCEDURE — 86140 C-REACTIVE PROTEIN: CPT | Performed by: STUDENT IN AN ORGANIZED HEALTH CARE EDUCATION/TRAINING PROGRAM

## 2023-12-31 PROCEDURE — 80053 COMPREHEN METABOLIC PANEL: CPT | Performed by: STUDENT IN AN ORGANIZED HEALTH CARE EDUCATION/TRAINING PROGRAM

## 2023-12-31 PROCEDURE — 0202U NFCT DS 22 TRGT SARS-COV-2: CPT | Performed by: STUDENT IN AN ORGANIZED HEALTH CARE EDUCATION/TRAINING PROGRAM

## 2023-12-31 PROCEDURE — 71250 CT THORAX DX C-: CPT | Performed by: RADIOLOGY

## 2023-12-31 PROCEDURE — 85652 RBC SED RATE AUTOMATED: CPT | Performed by: STUDENT IN AN ORGANIZED HEALTH CARE EDUCATION/TRAINING PROGRAM

## 2023-12-31 PROCEDURE — 36600 WITHDRAWAL OF ARTERIAL BLOOD: CPT

## 2023-12-31 PROCEDURE — 99285 EMERGENCY DEPT VISIT HI MDM: CPT

## 2023-12-31 PROCEDURE — 83880 ASSAY OF NATRIURETIC PEPTIDE: CPT | Performed by: STUDENT IN AN ORGANIZED HEALTH CARE EDUCATION/TRAINING PROGRAM

## 2023-12-31 PROCEDURE — 25010000002 LEVOFLOXACIN PER 250 MG: Performed by: STUDENT IN AN ORGANIZED HEALTH CARE EDUCATION/TRAINING PROGRAM

## 2023-12-31 PROCEDURE — 83050 HGB METHEMOGLOBIN QUAN: CPT

## 2023-12-31 PROCEDURE — 93010 ELECTROCARDIOGRAM REPORT: CPT | Performed by: INTERNAL MEDICINE

## 2023-12-31 PROCEDURE — 84484 ASSAY OF TROPONIN QUANT: CPT | Performed by: STUDENT IN AN ORGANIZED HEALTH CARE EDUCATION/TRAINING PROGRAM

## 2023-12-31 PROCEDURE — 71250 CT THORAX DX C-: CPT

## 2023-12-31 PROCEDURE — 25010000002 VANCOMYCIN 5 G RECONSTITUTED SOLUTION: Performed by: STUDENT IN AN ORGANIZED HEALTH CARE EDUCATION/TRAINING PROGRAM

## 2023-12-31 PROCEDURE — 81001 URINALYSIS AUTO W/SCOPE: CPT | Performed by: STUDENT IN AN ORGANIZED HEALTH CARE EDUCATION/TRAINING PROGRAM

## 2023-12-31 PROCEDURE — 87040 BLOOD CULTURE FOR BACTERIA: CPT | Performed by: STUDENT IN AN ORGANIZED HEALTH CARE EDUCATION/TRAINING PROGRAM

## 2023-12-31 PROCEDURE — 25810000003 SODIUM CHLORIDE 0.9 % SOLUTION: Performed by: STUDENT IN AN ORGANIZED HEALTH CARE EDUCATION/TRAINING PROGRAM

## 2023-12-31 PROCEDURE — 25010000002 CEFTRIAXONE PER 250 MG: Performed by: STUDENT IN AN ORGANIZED HEALTH CARE EDUCATION/TRAINING PROGRAM

## 2023-12-31 PROCEDURE — 84145 PROCALCITONIN (PCT): CPT | Performed by: STUDENT IN AN ORGANIZED HEALTH CARE EDUCATION/TRAINING PROGRAM

## 2023-12-31 PROCEDURE — 87086 URINE CULTURE/COLONY COUNT: CPT | Performed by: STUDENT IN AN ORGANIZED HEALTH CARE EDUCATION/TRAINING PROGRAM

## 2023-12-31 PROCEDURE — 93005 ELECTROCARDIOGRAM TRACING: CPT | Performed by: STUDENT IN AN ORGANIZED HEALTH CARE EDUCATION/TRAINING PROGRAM

## 2023-12-31 PROCEDURE — 71045 X-RAY EXAM CHEST 1 VIEW: CPT | Performed by: RADIOLOGY

## 2023-12-31 RX ORDER — LEVOFLOXACIN 5 MG/ML
750 INJECTION, SOLUTION INTRAVENOUS
Status: DISCONTINUED | OUTPATIENT
Start: 2023-12-31 | End: 2024-01-01

## 2023-12-31 RX ORDER — SODIUM CHLORIDE 0.9 % (FLUSH) 0.9 %
10 SYRINGE (ML) INJECTION AS NEEDED
Status: DISCONTINUED | OUTPATIENT
Start: 2023-12-31 | End: 2024-01-04 | Stop reason: HOSPADM

## 2023-12-31 RX ORDER — VANCOMYCIN 2 GRAM/500 ML IN 0.9 % SODIUM CHLORIDE INTRAVENOUS
20 ONCE
Status: COMPLETED | OUTPATIENT
Start: 2023-12-31 | End: 2024-01-01

## 2023-12-31 RX ADMIN — LEVOFLOXACIN 750 MG: 5 INJECTION, SOLUTION INTRAVENOUS at 23:20

## 2023-12-31 RX ADMIN — SODIUM CHLORIDE 500 ML: 9 INJECTION, SOLUTION INTRAVENOUS at 22:03

## 2023-12-31 RX ADMIN — VANCOMYCIN HYDROCHLORIDE 2000 MG: 500 INJECTION, POWDER, LYOPHILIZED, FOR SOLUTION INTRAVENOUS at 23:20

## 2023-12-31 RX ADMIN — CEFTRIAXONE 1000 MG: 1 INJECTION, POWDER, FOR SOLUTION INTRAMUSCULAR; INTRAVENOUS at 22:03

## 2023-12-31 NOTE — ED NOTES
MEDICAL SCREENING:    Reason for Visit: Shortness of breath    Patient initially seen in triage.  The patient was advised further evaluation and diagnostic testing will be needed, some of the treatment and testing will be initiated in the lobby in order to begin the process.  The patient will be returned to the waiting area for the time being and possibly be re-assessed by a subsequent ED provider.  The patient will be brought back to the treatment area in as timely manner as possible.       Nima Cooley, DO  12/31/23 1817    
Dr. Cooley made aware of O2 saturation and HR  
no
negative...

## 2024-01-01 ENCOUNTER — APPOINTMENT (OUTPATIENT)
Dept: CT IMAGING | Facility: HOSPITAL | Age: 79
End: 2024-01-01
Payer: MEDICARE

## 2024-01-01 PROBLEM — J18.9 SEPSIS DUE TO PNEUMONIA: Status: ACTIVE | Noted: 2024-01-01

## 2024-01-01 PROBLEM — A41.9 SEPSIS DUE TO PNEUMONIA: Status: ACTIVE | Noted: 2024-01-01

## 2024-01-01 LAB
ALBUMIN SERPL-MCNC: 3.1 G/DL (ref 3.5–5.2)
ALBUMIN/GLOB SERPL: 1 G/DL
ALP SERPL-CCNC: 115 U/L (ref 39–117)
ALT SERPL W P-5'-P-CCNC: 9 U/L (ref 1–33)
AMPHET+METHAMPHET UR QL: NEGATIVE
AMPHETAMINES UR QL: NEGATIVE
ANION GAP SERPL CALCULATED.3IONS-SCNC: 17.9 MMOL/L (ref 5–15)
AST SERPL-CCNC: 14 U/L (ref 1–32)
BARBITURATES UR QL SCN: NEGATIVE
BASOPHILS # BLD AUTO: 0.07 10*3/MM3 (ref 0–0.2)
BASOPHILS NFR BLD AUTO: 0.7 % (ref 0–1.5)
BENZODIAZ UR QL SCN: NEGATIVE
BILIRUB SERPL-MCNC: 0.3 MG/DL (ref 0–1.2)
BILIRUB UR QL STRIP: NEGATIVE
BUN SERPL-MCNC: 21 MG/DL (ref 8–23)
BUN/CREAT SERPL: 20 (ref 7–25)
BUPRENORPHINE SERPL-MCNC: NEGATIVE NG/ML
CALCIUM SPEC-SCNC: 8.4 MG/DL (ref 8.6–10.5)
CANNABINOIDS SERPL QL: NEGATIVE
CHLORIDE SERPL-SCNC: 103 MMOL/L (ref 98–107)
CLARITY UR: ABNORMAL
CO2 SERPL-SCNC: 18.1 MMOL/L (ref 22–29)
COCAINE UR QL: NEGATIVE
COLOR UR: ABNORMAL
CREAT SERPL-MCNC: 1.05 MG/DL (ref 0.57–1)
CRP SERPL-MCNC: 26.3 MG/DL (ref 0–0.5)
DEPRECATED RDW RBC AUTO: 58.2 FL (ref 37–54)
EGFRCR SERPLBLD CKD-EPI 2021: 54.8 ML/MIN/1.73
EOSINOPHIL # BLD AUTO: 0.28 10*3/MM3 (ref 0–0.4)
EOSINOPHIL NFR BLD AUTO: 2.7 % (ref 0.3–6.2)
ERYTHROCYTE [DISTWIDTH] IN BLOOD BY AUTOMATED COUNT: 17.7 % (ref 12.3–15.4)
FENTANYL UR-MCNC: NEGATIVE NG/ML
GEN 5 2HR TROPONIN T REFLEX: 20 NG/L
GLOBULIN UR ELPH-MCNC: 3 GM/DL
GLUCOSE BLDC GLUCOMTR-MCNC: 125 MG/DL (ref 70–130)
GLUCOSE BLDC GLUCOMTR-MCNC: 146 MG/DL (ref 70–130)
GLUCOSE BLDC GLUCOMTR-MCNC: 250 MG/DL (ref 70–130)
GLUCOSE BLDC GLUCOMTR-MCNC: 276 MG/DL (ref 70–130)
GLUCOSE BLDC GLUCOMTR-MCNC: 282 MG/DL (ref 70–130)
GLUCOSE BLDC GLUCOMTR-MCNC: 298 MG/DL (ref 70–130)
GLUCOSE SERPL-MCNC: 138 MG/DL (ref 65–99)
GLUCOSE UR STRIP-MCNC: ABNORMAL MG/DL
HBA1C MFR BLD: 7.7 % (ref 4.8–5.6)
HCT VFR BLD AUTO: 35.3 % (ref 34–46.6)
HGB BLD-MCNC: 10.7 G/DL (ref 12–15.9)
HGB UR QL STRIP.AUTO: NEGATIVE
IMM GRANULOCYTES # BLD AUTO: 0.32 10*3/MM3 (ref 0–0.05)
IMM GRANULOCYTES NFR BLD AUTO: 3.1 % (ref 0–0.5)
KETONES UR QL STRIP: ABNORMAL
L PNEUMO1 AG UR QL IA: NEGATIVE
LEUKOCYTE ESTERASE UR QL STRIP.AUTO: ABNORMAL
LYMPHOCYTES # BLD AUTO: 0.58 10*3/MM3 (ref 0.7–3.1)
LYMPHOCYTES NFR BLD AUTO: 5.6 % (ref 19.6–45.3)
M PNEUMO IGM SER QL: NEGATIVE
MCH RBC QN AUTO: 27.1 PG (ref 26.6–33)
MCHC RBC AUTO-ENTMCNC: 30.3 G/DL (ref 31.5–35.7)
MCV RBC AUTO: 89.4 FL (ref 79–97)
METHADONE UR QL SCN: NEGATIVE
MONOCYTES # BLD AUTO: 1.1 10*3/MM3 (ref 0.1–0.9)
MONOCYTES NFR BLD AUTO: 10.6 % (ref 5–12)
MRSA DNA SPEC QL NAA+PROBE: NORMAL
NEUTROPHILS NFR BLD AUTO: 77.3 % (ref 42.7–76)
NEUTROPHILS NFR BLD AUTO: 8.07 10*3/MM3 (ref 1.7–7)
NITRITE UR QL STRIP: NEGATIVE
NRBC BLD AUTO-RTO: 0 /100 WBC (ref 0–0.2)
OPIATES UR QL: POSITIVE
OXYCODONE UR QL SCN: NEGATIVE
PCP UR QL SCN: NEGATIVE
PH UR STRIP.AUTO: 5.5 [PH] (ref 5–8)
PLATELET # BLD AUTO: 211 10*3/MM3 (ref 140–450)
PMV BLD AUTO: 11.1 FL (ref 6–12)
POTASSIUM SERPL-SCNC: 3.7 MMOL/L (ref 3.5–5.2)
PROT SERPL-MCNC: 6.1 G/DL (ref 6–8.5)
PROT UR QL STRIP: ABNORMAL
QT INTERVAL: 336 MS
QT INTERVAL: 386 MS
QTC INTERVAL: 477 MS
QTC INTERVAL: 512 MS
RBC # BLD AUTO: 3.95 10*6/MM3 (ref 3.77–5.28)
S PNEUM AG SPEC QL LA: NEGATIVE
SODIUM SERPL-SCNC: 139 MMOL/L (ref 136–145)
SP GR UR STRIP: 1.03 (ref 1–1.03)
TRICYCLICS UR QL SCN: NEGATIVE
TROPONIN T DELTA: 0 NG/L
TROPONIN T SERPL HS-MCNC: 20 NG/L
TSH SERPL DL<=0.05 MIU/L-ACNC: 3.09 UIU/ML (ref 0.27–4.2)
UROBILINOGEN UR QL STRIP: ABNORMAL
WBC NRBC COR # BLD AUTO: 10.42 10*3/MM3 (ref 3.4–10.8)

## 2024-01-01 PROCEDURE — 94761 N-INVAS EAR/PLS OXIMETRY MLT: CPT

## 2024-01-01 PROCEDURE — 86140 C-REACTIVE PROTEIN: CPT | Performed by: PHYSICIAN ASSISTANT

## 2024-01-01 PROCEDURE — 25010000002 METHYLPREDNISOLONE PER 40 MG: Performed by: HOSPITALIST

## 2024-01-01 PROCEDURE — 70450 CT HEAD/BRAIN W/O DYE: CPT | Performed by: RADIOLOGY

## 2024-01-01 PROCEDURE — 87070 CULTURE OTHR SPECIMN AEROBIC: CPT | Performed by: INTERNAL MEDICINE

## 2024-01-01 PROCEDURE — 80307 DRUG TEST PRSMV CHEM ANLYZR: CPT | Performed by: PHYSICIAN ASSISTANT

## 2024-01-01 PROCEDURE — 85025 COMPLETE CBC W/AUTO DIFF WBC: CPT | Performed by: INTERNAL MEDICINE

## 2024-01-01 PROCEDURE — 94799 UNLISTED PULMONARY SVC/PX: CPT

## 2024-01-01 PROCEDURE — 93005 ELECTROCARDIOGRAM TRACING: CPT | Performed by: PHYSICIAN ASSISTANT

## 2024-01-01 PROCEDURE — 86738 MYCOPLASMA ANTIBODY: CPT | Performed by: INTERNAL MEDICINE

## 2024-01-01 PROCEDURE — 80053 COMPREHEN METABOLIC PANEL: CPT | Performed by: INTERNAL MEDICINE

## 2024-01-01 PROCEDURE — 84443 ASSAY THYROID STIM HORMONE: CPT | Performed by: PHYSICIAN ASSISTANT

## 2024-01-01 PROCEDURE — 70450 CT HEAD/BRAIN W/O DYE: CPT

## 2024-01-01 PROCEDURE — 87449 NOS EACH ORGANISM AG IA: CPT | Performed by: INTERNAL MEDICINE

## 2024-01-01 PROCEDURE — 25810000003 SODIUM CHLORIDE 0.9 % SOLUTION: Performed by: PHYSICIAN ASSISTANT

## 2024-01-01 PROCEDURE — 83036 HEMOGLOBIN GLYCOSYLATED A1C: CPT | Performed by: PHYSICIAN ASSISTANT

## 2024-01-01 PROCEDURE — 25010000002 HEPARIN (PORCINE) PER 1000 UNITS: Performed by: INTERNAL MEDICINE

## 2024-01-01 PROCEDURE — 84484 ASSAY OF TROPONIN QUANT: CPT | Performed by: PHYSICIAN ASSISTANT

## 2024-01-01 PROCEDURE — 25010000002 PIPERACILLIN SOD-TAZOBACTAM PER 1 G: Performed by: INTERNAL MEDICINE

## 2024-01-01 PROCEDURE — 82948 REAGENT STRIP/BLOOD GLUCOSE: CPT

## 2024-01-01 PROCEDURE — 87641 MR-STAPH DNA AMP PROBE: CPT | Performed by: INTERNAL MEDICINE

## 2024-01-01 PROCEDURE — 87205 SMEAR GRAM STAIN: CPT | Performed by: INTERNAL MEDICINE

## 2024-01-01 PROCEDURE — 99221 1ST HOSP IP/OBS SF/LOW 40: CPT | Performed by: INTERNAL MEDICINE

## 2024-01-01 PROCEDURE — 94640 AIRWAY INHALATION TREATMENT: CPT

## 2024-01-01 PROCEDURE — 63710000001 PREDNISONE PER 1 MG: Performed by: INTERNAL MEDICINE

## 2024-01-01 PROCEDURE — 63710000001 INSULIN LISPRO (HUMAN) PER 5 UNITS: Performed by: INTERNAL MEDICINE

## 2024-01-01 PROCEDURE — 87899 AGENT NOS ASSAY W/OPTIC: CPT | Performed by: INTERNAL MEDICINE

## 2024-01-01 RX ORDER — POLYETHYLENE GLYCOL 3350 17 G/17G
17 POWDER, FOR SOLUTION ORAL DAILY PRN
Status: DISCONTINUED | OUTPATIENT
Start: 2024-01-01 | End: 2024-01-04 | Stop reason: HOSPADM

## 2024-01-01 RX ORDER — DEXTROSE MONOHYDRATE 25 G/50ML
25 INJECTION, SOLUTION INTRAVENOUS
Status: DISCONTINUED | OUTPATIENT
Start: 2024-01-01 | End: 2024-01-04 | Stop reason: HOSPADM

## 2024-01-01 RX ORDER — ALBUTEROL SULFATE 2.5 MG/3ML
2.5 SOLUTION RESPIRATORY (INHALATION) EVERY 4 HOURS PRN
Status: CANCELLED | OUTPATIENT
Start: 2024-01-01

## 2024-01-01 RX ORDER — ATORVASTATIN CALCIUM 20 MG/1
20 TABLET, FILM COATED ORAL DAILY
Status: DISCONTINUED | OUTPATIENT
Start: 2024-01-02 | End: 2024-01-04 | Stop reason: HOSPADM

## 2024-01-01 RX ORDER — HYDROXYZINE HYDROCHLORIDE 25 MG/1
25 TABLET, FILM COATED ORAL 3 TIMES DAILY PRN
Status: DISCONTINUED | OUTPATIENT
Start: 2024-01-01 | End: 2024-01-04 | Stop reason: HOSPADM

## 2024-01-01 RX ORDER — NITROGLYCERIN 0.4 MG/1
0.4 TABLET SUBLINGUAL
Status: DISCONTINUED | OUTPATIENT
Start: 2024-01-01 | End: 2024-01-04 | Stop reason: HOSPADM

## 2024-01-01 RX ORDER — LISINOPRIL 2.5 MG/1
5 TABLET ORAL DAILY
Status: CANCELLED | OUTPATIENT
Start: 2024-01-01

## 2024-01-01 RX ORDER — SPIRONOLACTONE 25 MG/1
25 TABLET ORAL DAILY
COMMUNITY

## 2024-01-01 RX ORDER — HYDROXYZINE HYDROCHLORIDE 25 MG/1
25 TABLET, FILM COATED ORAL NIGHTLY
Status: DISCONTINUED | OUTPATIENT
Start: 2024-01-01 | End: 2024-01-04 | Stop reason: HOSPADM

## 2024-01-01 RX ORDER — NICOTINE POLACRILEX 4 MG
15 LOZENGE BUCCAL
Status: DISCONTINUED | OUTPATIENT
Start: 2024-01-01 | End: 2024-01-04 | Stop reason: HOSPADM

## 2024-01-01 RX ORDER — SODIUM CHLORIDE 0.9 % (FLUSH) 0.9 %
10 SYRINGE (ML) INJECTION EVERY 12 HOURS SCHEDULED
Status: DISCONTINUED | OUTPATIENT
Start: 2024-01-01 | End: 2024-01-04 | Stop reason: HOSPADM

## 2024-01-01 RX ORDER — ACETAMINOPHEN 325 MG/1
650 TABLET ORAL EVERY 6 HOURS PRN
Status: DISCONTINUED | OUTPATIENT
Start: 2024-01-01 | End: 2024-01-04 | Stop reason: HOSPADM

## 2024-01-01 RX ORDER — BUDESONIDE 0.5 MG/2ML
0.5 INHALANT ORAL
Status: DISCONTINUED | OUTPATIENT
Start: 2024-01-01 | End: 2024-01-04 | Stop reason: HOSPADM

## 2024-01-01 RX ORDER — HYDROXYZINE PAMOATE 25 MG/1
25 CAPSULE ORAL NIGHTLY
COMMUNITY

## 2024-01-01 RX ORDER — PANTOPRAZOLE SODIUM 40 MG/1
40 TABLET, DELAYED RELEASE ORAL
Status: DISCONTINUED | OUTPATIENT
Start: 2024-01-01 | End: 2024-01-04 | Stop reason: HOSPADM

## 2024-01-01 RX ORDER — SPIRONOLACTONE 25 MG/1
25 TABLET ORAL DAILY
Status: CANCELLED | OUTPATIENT
Start: 2024-01-01

## 2024-01-01 RX ORDER — ALBUTEROL SULFATE 90 UG/1
2 AEROSOL, METERED RESPIRATORY (INHALATION) EVERY 4 HOURS PRN
COMMUNITY

## 2024-01-01 RX ORDER — INSULIN LISPRO 100 [IU]/ML
2-7 INJECTION, SOLUTION INTRAVENOUS; SUBCUTANEOUS
Status: DISCONTINUED | OUTPATIENT
Start: 2024-01-01 | End: 2024-01-02 | Stop reason: SDUPTHER

## 2024-01-01 RX ORDER — SODIUM CHLORIDE 0.9 % (FLUSH) 0.9 %
10 SYRINGE (ML) INJECTION AS NEEDED
Status: DISCONTINUED | OUTPATIENT
Start: 2024-01-01 | End: 2024-01-04 | Stop reason: HOSPADM

## 2024-01-01 RX ORDER — GUAIFENESIN/DEXTROMETHORPHAN 100-10MG/5
5 SYRUP ORAL EVERY 4 HOURS PRN
Status: DISCONTINUED | OUTPATIENT
Start: 2024-01-01 | End: 2024-01-04 | Stop reason: HOSPADM

## 2024-01-01 RX ORDER — PREDNISONE 20 MG/1
40 TABLET ORAL
Status: DISCONTINUED | OUTPATIENT
Start: 2024-01-01 | End: 2024-01-01

## 2024-01-01 RX ORDER — CHOLECALCIFEROL (VITAMIN D3) 125 MCG
10 CAPSULE ORAL NIGHTLY PRN
Status: DISCONTINUED | OUTPATIENT
Start: 2024-01-01 | End: 2024-01-04 | Stop reason: HOSPADM

## 2024-01-01 RX ORDER — CALCIUM CARBONATE 500 MG/1
2 TABLET, CHEWABLE ORAL 3 TIMES DAILY PRN
Status: DISCONTINUED | OUTPATIENT
Start: 2024-01-01 | End: 2024-01-04 | Stop reason: HOSPADM

## 2024-01-01 RX ORDER — PANTOPRAZOLE SODIUM 40 MG/1
40 TABLET, DELAYED RELEASE ORAL DAILY
Status: CANCELLED | OUTPATIENT
Start: 2024-01-01

## 2024-01-01 RX ORDER — SODIUM CHLORIDE 9 MG/ML
40 INJECTION, SOLUTION INTRAVENOUS AS NEEDED
Status: DISCONTINUED | OUTPATIENT
Start: 2024-01-01 | End: 2024-01-04 | Stop reason: HOSPADM

## 2024-01-01 RX ORDER — GLUCAGON 1 MG/ML
1 KIT INJECTION
Status: DISCONTINUED | OUTPATIENT
Start: 2024-01-01 | End: 2024-01-04 | Stop reason: HOSPADM

## 2024-01-01 RX ORDER — ARFORMOTEROL TARTRATE 15 UG/2ML
15 SOLUTION RESPIRATORY (INHALATION)
Status: DISCONTINUED | OUTPATIENT
Start: 2024-01-01 | End: 2024-01-04 | Stop reason: HOSPADM

## 2024-01-01 RX ORDER — SODIUM CHLORIDE 9 MG/ML
50 INJECTION, SOLUTION INTRAVENOUS CONTINUOUS
Status: DISCONTINUED | OUTPATIENT
Start: 2024-01-01 | End: 2024-01-01

## 2024-01-01 RX ORDER — BISACODYL 5 MG/1
5 TABLET, DELAYED RELEASE ORAL DAILY PRN
Status: DISCONTINUED | OUTPATIENT
Start: 2024-01-01 | End: 2024-01-04 | Stop reason: HOSPADM

## 2024-01-01 RX ORDER — BISACODYL 10 MG
10 SUPPOSITORY, RECTAL RECTAL DAILY PRN
Status: DISCONTINUED | OUTPATIENT
Start: 2024-01-01 | End: 2024-01-04 | Stop reason: HOSPADM

## 2024-01-01 RX ORDER — IPRATROPIUM BROMIDE AND ALBUTEROL SULFATE 2.5; .5 MG/3ML; MG/3ML
3 SOLUTION RESPIRATORY (INHALATION) EVERY 6 HOURS PRN
Status: DISCONTINUED | OUTPATIENT
Start: 2024-01-01 | End: 2024-01-04 | Stop reason: HOSPADM

## 2024-01-01 RX ORDER — AMOXICILLIN 250 MG
2 CAPSULE ORAL 2 TIMES DAILY
Status: DISCONTINUED | OUTPATIENT
Start: 2024-01-01 | End: 2024-01-04 | Stop reason: HOSPADM

## 2024-01-01 RX ORDER — METHYLPREDNISOLONE SODIUM SUCCINATE 40 MG/ML
40 INJECTION, POWDER, LYOPHILIZED, FOR SOLUTION INTRAMUSCULAR; INTRAVENOUS EVERY 12 HOURS
Status: DISCONTINUED | OUTPATIENT
Start: 2024-01-01 | End: 2024-01-03

## 2024-01-01 RX ORDER — HEPARIN SODIUM 5000 [USP'U]/ML
5000 INJECTION, SOLUTION INTRAVENOUS; SUBCUTANEOUS EVERY 12 HOURS SCHEDULED
Status: DISCONTINUED | OUTPATIENT
Start: 2024-01-01 | End: 2024-01-01

## 2024-01-01 RX ADMIN — INSULIN LISPRO 4 UNITS: 100 INJECTION, SOLUTION INTRAVENOUS; SUBCUTANEOUS at 21:01

## 2024-01-01 RX ADMIN — IPRATROPIUM BROMIDE 0.5 MG: 0.5 SOLUTION RESPIRATORY (INHALATION) at 19:17

## 2024-01-01 RX ADMIN — Medication 10 MG: at 21:00

## 2024-01-01 RX ADMIN — Medication 10 ML: at 09:07

## 2024-01-01 RX ADMIN — DOXYCYCLINE 100 MG: 100 INJECTION, POWDER, LYOPHILIZED, FOR SOLUTION INTRAVENOUS at 04:32

## 2024-01-01 RX ADMIN — PANTOPRAZOLE SODIUM 40 MG: 40 TABLET, DELAYED RELEASE ORAL at 05:46

## 2024-01-01 RX ADMIN — ARFORMOTEROL TARTRATE 15 MCG: 15 SOLUTION RESPIRATORY (INHALATION) at 19:17

## 2024-01-01 RX ADMIN — PREDNISONE 40 MG: 20 TABLET ORAL at 09:07

## 2024-01-01 RX ADMIN — Medication 10 ML: at 21:01

## 2024-01-01 RX ADMIN — HEPARIN SODIUM 5000 UNITS: 5000 INJECTION INTRAVENOUS; SUBCUTANEOUS at 09:07

## 2024-01-01 RX ADMIN — PIPERACILLIN SODIUM AND TAZOBACTAM SODIUM 3.38 G: 3; .375 INJECTION, POWDER, LYOPHILIZED, FOR SOLUTION INTRAVENOUS at 17:30

## 2024-01-01 RX ADMIN — BUDESONIDE INHALATION 0.5 MG: 0.5 SUSPENSION RESPIRATORY (INHALATION) at 19:17

## 2024-01-01 RX ADMIN — APIXABAN 5 MG: 5 TABLET, FILM COATED ORAL at 21:00

## 2024-01-01 RX ADMIN — PIPERACILLIN SODIUM AND TAZOBACTAM SODIUM 3.38 G: 3; .375 INJECTION, POWDER, LYOPHILIZED, FOR SOLUTION INTRAVENOUS at 04:31

## 2024-01-01 RX ADMIN — SODIUM CHLORIDE 50 ML/HR: 9 INJECTION, SOLUTION INTRAVENOUS at 04:30

## 2024-01-01 RX ADMIN — INSULIN LISPRO 4 UNITS: 100 INJECTION, SOLUTION INTRAVENOUS; SUBCUTANEOUS at 12:22

## 2024-01-01 RX ADMIN — IPRATROPIUM BROMIDE 0.5 MG: 0.5 SOLUTION RESPIRATORY (INHALATION) at 06:38

## 2024-01-01 RX ADMIN — PIPERACILLIN SODIUM AND TAZOBACTAM SODIUM 3.38 G: 3; .375 INJECTION, POWDER, LYOPHILIZED, FOR SOLUTION INTRAVENOUS at 12:22

## 2024-01-01 RX ADMIN — INSULIN LISPRO 4 UNITS: 100 INJECTION, SOLUTION INTRAVENOUS; SUBCUTANEOUS at 17:29

## 2024-01-01 RX ADMIN — METHYLPREDNISOLONE SODIUM SUCCINATE 40 MG: 40 INJECTION, POWDER, FOR SOLUTION INTRAMUSCULAR; INTRAVENOUS at 17:29

## 2024-01-01 RX ADMIN — HYDROXYZINE HYDROCHLORIDE 25 MG: 25 TABLET, FILM COATED ORAL at 21:01

## 2024-01-01 RX ADMIN — DOXYCYCLINE 100 MG: 100 INJECTION, POWDER, LYOPHILIZED, FOR SOLUTION INTRAVENOUS at 17:29

## 2024-01-01 RX ADMIN — IPRATROPIUM BROMIDE 0.5 MG: 0.5 SOLUTION RESPIRATORY (INHALATION) at 12:29

## 2024-01-01 NOTE — PLAN OF CARE
Goal Outcome Evaluation:  Plan of Care Reviewed With: patient  Progress: no change   Patient admitted to floor this shift from ED. Fluids infusing per order. IV antibiotics continued. Plan for home on discharge. Isolation precautions maintained. Will continue to follow with plan of care.    yes

## 2024-01-01 NOTE — CONSULTS
Consults    Patient Identification:    Name:  Mayra Hays  Age:  78 y.o.  Sex:  female  :  1945  MRN:  [id number]  Visit Number:  [id number]  Primary care provider:  Niki Antony APRN    Reason for consult  Positive troponin    Chief complaint:   Shortness of breath, dysuria    History of presenting illness:   78-year-old white female patient is difficult historian history was obtained from somewhat with the patient and chart patient was complaining of dysuria symptoms and also having cough therefore family brought her in for further evaluation patient found to have UTI, sepsis  Patient has a past medical history significant for CHF, coronary kidney disease, COPD, diabetes  ---------------------------------------------------------------------------------------------------------------------  ROS: All systems reviewed and negative except noted above.  ---------------------------------------------------------------------------------------------------------------------   Past History:   Family History   Problem Relation Age of Onset    Heart disease Mother         Rhianna sophia    Heart disease Father     Heart attack Father     Heart failure Father      Past Medical History:   Diagnosis Date    Arthritis     CHF (congestive heart failure)     Chronic anticoagulation     Eliquis    Chronic kidney disease     stage II/IIIa    Collapsed lung     COPD (chronic obstructive pulmonary disease)     Diabetes mellitus     TYPE 2    Elevated cholesterol     GERD (gastroesophageal reflux disease)     Hyperlipidemia     Hypertension     Non-small cell lung cancer RUL     Sleep apnea     non compliant with CPAP    Stroke 2019    Wears eyeglasses      Past Surgical History:   Procedure Laterality Date    BRONCHOSCOPY Bilateral 2023    Procedure: BRONCHOSCOPY WITH ENDOBRONCHIAL ULTRASOUND;  Surgeon: Abdulkadir Peter MD;  Location:  [facility];  Service: Pulmonary;  Laterality: Bilateral;    BRONCHOSCOPY WITH ION  ROBOTIC ASSIST N/A 2023    Procedure: BRONCHOSCOPY WITH ION ROBOT AND EBUS;  Surgeon: John Clemens MD;  Location:  [facility];  Service: Robotics - Pulmonary;  Laterality: N/A;  Ion cath #6 - 0032,  #6 - 0030, cath guide # 0077. EBUS scope removed with balloon intact.    CARDIAC CATHETERIZATION N/A 2017    Procedure: Left Heart Cath;  Surgeon: Jatinder Matias MD;  Location:  [facility];  Service:     CARDIAC CATHETERIZATION N/A 2021    Procedure: Left Heart Cath;  Surgeon: Tolu Steinberg MD;  Location:  [facility];  Service: Cardiology;  Laterality: N/A;    COLONOSCOPY      ENDOSCOPY      GALLBLADDER SURGERY      PORTACATH PLACEMENT N/A 2023    Procedure: INSERTION OF PORTACATH;  Surgeon: Petr Velásquez MD;  Location:  [facility];  Service: General;  Laterality: N/A;    VENTRAL HERNIA REPAIR N/A 2020    Procedure: VENTRAL HERNIA REPAIR LAPAROSCOPIC WITH DAVINCI ROBOT;  Surgeon: Petr Velásquez MD;  Location:  [facility];  Service: DaVinci;  Laterality: N/A;     Social History     Socioeconomic History    Marital status: Single    Number of children: 6   Tobacco Use    Smoking status: Former     Packs/day: 3     Types: Cigarettes     Start date: 4/3/1998     Quit date: 2015     Years since quittin.0     Passive exposure: Past    Smokeless tobacco: Never   Vaping Use    Vaping Use: Never used   Substance and Sexual Activity    Alcohol use: No    Drug use: No    Sexual activity: Never     ---------------------------------------------------------------------------------------------------------------------   Allergies:  Paclitaxel  ---------------------------------------------------------------------------------------------------------------------   Prior to Admission Medications       Prescriptions Last Dose Informant Patient Reported? Taking?    apixaban (ELIQUIS) 5 MG tablet tablet 2023  No Yes    Take 1 tablet by  mouth Every 12 (Twelve) Hours.    atorvastatin (LIPITOR) 20 MG tablet 12/31/2023  No Yes    Take 1 tablet by mouth Daily.    Budeson-Glycopyrrol-Formoterol (Breztri Aerosphere) 160-9-4.8 MCG/ACT aerosol inhaler 12/31/2023 Self No Yes    Inhale 2 puffs 2 (Two) Times a Day.    esomeprazole (nexIUM) 20 MG capsule 12/31/2023 Self Yes Yes    Take 1 capsule by mouth Daily.    hydrOXYzine pamoate (VISTARIL) 25 MG capsule 12/30/2023  Yes Yes    Take 1 capsule by mouth Every Night.    lisinopril (PRINIVIL,ZESTRIL) 5 MG tablet 12/31/2023  No Yes    Take 1 tablet by mouth Daily.    pantoprazole (PROTONIX) 40 MG EC tablet 12/31/2023 Pharmacy Yes Yes    Take 1 tablet by mouth Daily.    albuterol sulfate  (90 Base) MCG/ACT inhaler Unknown Pharmacy Yes No    Inhale 2 puffs Every 4 (Four) Hours As Needed for Wheezing.    metFORMIN (GLUCOPHAGE) 500 MG tablet Unknown Pharmacy Yes No    Take 1 tablet by mouth 2 (Two) Times a Day As Needed (only takes if blood glucose is above 200).    trimethoprim (TRIMPEX) 100 MG tablet  Self No No    Take 1 tablet by mouth 2 (Two) Times a Day.          Hospital Meds:  arformoterol, 15 mcg, Nebulization, BID - RT  budesonide, 0.5 mg, Nebulization, BID - RT  doxycycline, 100 mg, Intravenous, Q12H  heparin (porcine), 5,000 Units, Subcutaneous, Q12H  influenza vaccine, 0.7 mL, Intramuscular, Once  insulin lispro, 2-7 Units, Subcutaneous, 4x Daily AC & at Bedtime  ipratropium, 0.5 mg, Nebulization, 4x Daily - RT  methylPREDNISolone sodium succinate, 40 mg, Intravenous, Q12H  pantoprazole, 40 mg, Oral, Q AM  Pharmacy to Dose LevoFLOXacin (LEVAQUIN), , Does not apply, Once  piperacillin-tazobactam, 3.375 g, Intravenous, Q8H  senna-docusate sodium, 2 tablet, Oral, BID  sodium chloride, 10 mL, Intravenous, Q12H         ---------------------------------------------------------------------------------------------------------------------   Vital Signs:  Temp:  [97.5 °F (36.4 °C)-98.8 °F (37.1 °C)] 97.5  °F (36.4 °C)  Heart Rate:  [] 84  Resp:  [17-22] 18  BP: ()/(50-85) 99/50      12/31/23  1714 01/01/24  0328 01/01/24  0500   Weight: 99.3 kg (219 lb) 98.3 kg (216 lb 11.2 oz) 98.3 kg (216 lb 11.2 oz) (Admit weight less than 24 hrs.)     Body mass index is 34.98 kg/m².  ---------------------------------------------------------------------------------------------------------------------   Physical exam:   Constitutional:  Well-developed and well-nourished.  No respiratory distress.      HENT:  Head: Normocephalic and atraumatic.  Mouth:  Moist mucous membranes.    Eyes:  Conjunctivae and EOM are normal.  Pupils are equal, round, and reactive to light.  No scleral icterus.  Neck:  Neck supple.  No JVD present.    Cardiovascular:  irregular rhythm and normal heart sounds   Pulmonary/Chest:  No respiratory distress, no crackles, with normal breath sounds and good air movement.  Abdominal:  Soft.  Bowel sounds are normal.  No distension and no tenderness.   Musculoskeletal: Positive edema, no tenderness, and no deformity.  No red or swollen joints anywhere.      ---------------------------------------------------------------------------------------------------------------------   EKG: Atrial fibrillation with left bundle branch block  Telemetry: Atrial fibrillation  I have personally looked at both the EKG and the telemetry strips.  Echo:  Results for orders placed during the hospital encounter of 06/07/21    Adult Transthoracic Echo Complete W/ Cont if Necessary Per Protocol    Interpretation Summary  · The study is technically difficult for diagnosis.  · Normal left ventricular cavity size and wall thickness noted. All left ventricular wall segments contract normally  · Left ventricular ejection fraction appears to be 61 - 65%.  · Left ventricular diastolic function is consistent with (grade I) impaired relaxation.  · The aortic valve is structurally normal with no regurgitation or stenosis present.  · The  "mitral valve is structurally normal with no regurgitation or significant stenosis present.  · There is no evidence of pericardial effusion.    ---------------------------------------------------------------------------------------------------------------------   Results from last 7 days   Lab Units 01/01/24 0401 12/31/23 2115 12/31/23 1815 12/26/23  1043   CRP mg/dL 26.30*  --  32.78*  --    LACTATE mmol/L  --  1.4  --   --    WBC 10*3/mm3 10.42  --  12.42* 8.94   HEMOGLOBIN g/dL 10.7*  --  11.6* 11.9*   HEMATOCRIT % 35.3  --  36.9 37.8   MCV fL 89.4  --  86.6 86.5   MCHC g/dL 30.3*  --  31.4* 31.5   PLATELETS 10*3/mm3 211  --  255 168     Results from last 7 days   Lab Units 12/31/23  1741   PH, ARTERIAL pH units 7.424   PO2 ART mm Hg 64.2*   PCO2, ARTERIAL mm Hg 34.0*   HCO3 ART mmol/L 22.2     Results from last 7 days   Lab Units 01/01/24 0401 12/31/23 1815 12/26/23  1043   SODIUM mmol/L 139 137 140   POTASSIUM mmol/L 3.7 3.5 3.8   CHLORIDE mmol/L 103 99 102   CO2 mmol/L 18.1* 21.4* 27.5   BUN mg/dL 21 27* 15   CREATININE mg/dL 1.05* 1.50* 0.97   CALCIUM mg/dL 8.4* 9.3 9.0   GLUCOSE mg/dL 138* 245* 141*   ALBUMIN g/dL 3.1* 3.4* 3.9   BILIRUBIN mg/dL 0.3 0.4 0.5   ALK PHOS U/L 115 157* 133*   AST (SGOT) U/L 14 18 11   ALT (SGPT) U/L 9 12 9   Estimated Creatinine Clearance: 53.1 mL/min (A) (by C-G formula based on SCr of 1.05 mg/dL (H)).  No results found for: \"AMMONIA\"  Results from last 7 days   Lab Units 01/01/24  0602 01/01/24  0401 12/31/23  1815   HSTROP T ng/L 20* 20* 21*     Results from last 7 days   Lab Units 12/31/23  1815   PROBNP pg/mL 2,015.0*     Lab Results   Component Value Date    HGBA1C 7.70 (H) 01/01/2024     Lab Results   Component Value Date    TSH 3.090 01/01/2024    FREET4 1.23 10/09/2023     No results found for: \"PREGTESTUR\", \"PREGSERUM\", \"HCG\", \"HCGQUANT\"  Pain Management Panel  More data may exist         Latest Ref Rng & Units 1/1/2024 5/30/2017   Pain Management Panel " "  Amphetamine, Urine Qual Negative Negative  Negative    Barbiturates Screen, Urine Negative Negative  Negative    Benzodiazepine Screen, Urine Negative Negative  Negative    Buprenorphine, Screen, Urine Negative Negative  Negative    Cocaine Screen, Urine Negative Negative  Negative    Fentanyl, Urine Negative Negative  -   Methadone Screen , Urine Negative Negative  Negative    Methamphetamine, Ur Negative Negative  -     No results found for: \"BLOODCX\"  No results found for: \"URINECX\"  No results found for: \"WOUNDCX\"  No results found for: \"STOOLCX\"        ---------------------------------------------------------------------------------------------------------------------   Imaging Results (Last 7 Days)       Procedure Component Value Units Date/Time    CT Head Without Contrast [[id number]] Collected: 01/01/24 0224     Updated: 01/01/24 0229    Narrative:      EXAMINATION: CT head without contrast     CLINICAL HISTORY: Mental status change. Unknown cause.     COMPARISON: 10/9/2023     TECHNIQUE: Contiguous 3 mm thick slices were obtained from the skull  base to the vertex without contrast. Coronal and sagittal reformats were  performed.     FINDINGS:  Brain volume is normal for age. There is no acute intracranial  hemorrhage. No acute territorial infarct. No extra-axial collection. No  shift of midline structures. No acute calvarial fracture. The visualized  paranasal sinuses and the mastoid air cells are largely clear. Mildly  displaced nasal bone fractures appear similar to the study 10/9/2023.       Impression:      1. No acute intracranial process. If symptoms persist, MRI should be  considered for further evaluation.     This report was finalized on 1/1/2024 2:27 AM by Rajat Avila MD.       CT Chest Without Contrast Diagnostic [[id number]] Collected: 12/31/23 2035     Updated: 12/31/23 2041    Narrative:      INDICATION: Cough.     COMPARISON: CT chest from 12/19/2023     TECHNIQUE: Axial CT images of the " chest were obtained without IV  contrast administration. Coronal and sagittal reformations were  reviewed.     FINDINGS:  The thoracic aorta appears normal in caliber. The heart is mildly  enlarged. No pericardial or pleural effusion. No pneumothorax. Enlarged  mediastinal lymph nodes measuring up to 1.5 cm.     Spiculated nodule in the right upper lobe measuring 1.9 cm.  Atelectasis/airspace disease in the lingula and left lower lobe.  Masslike consolidation in the right lower lobe, difficult evaluation  secondary to motion artifact and lack of IV contrast. Chest port noted.     Degenerative changes in the spine. No acute osseous abnormality evident.     Visualized upper abdominal structures appear unremarkable.       Impression:      Spiculated nodule of the right upper lobe concerning for neoplasm.  Masslike consolidation in the right lower lobe could represent pneumonia  with underlying mass not excluded. Atelectasis/airspace disease in the  left lower lobe and lingula. Mediastinal adenopathy. Follow-up  recommended.        This report was finalized on 12/31/2023 8:39 PM by Alex Pallas, DO.       XR Chest 1 View [[id number]] Collected: 12/31/23 1941     Updated: 12/31/23 1944    Narrative:      INDICATION: Shortness of breath.     TECHNIQUE: Frontal radiograph of the chest.     COMPARISON: 10/9/2023.     FINDINGS:   Cardiomegaly. Small bilateral pleural effusions with underlying  atelectasis/airspace disease. Chronic appearing interstitial lung  markings. No pneumothorax. Chest port catheter tip in the previously  healed. No acute fracture.       Impression:      Small bilateral pleural effusions with underlying atelectasis/airspace  disease.        This report was finalized on 12/31/2023 7:42 PM by Alex Pallas, DO.             ----------------------------------------------------------------------------------------------------------------------  Assessment:   Type II MI due to sepsis  Chronic atrial  fibrillation  Hypertension  Diabetes mellitus  Sepsis      Plan:   Will recommend antibiotics  2D echo to evaluate LV function  Continue current anticoagulation  Will monitor during this hospitalization  No further cardiac intervention is needed at the present time  Thank you for the consult.    Adam Saprrow MD  01/01/24  14:59 EST

## 2024-01-01 NOTE — H&P
[facility] Medicine Services  HISTORY & PHYSICAL    Patient Identification:  Name:  Mayra Hays  Age:  78 y.o.  Sex:  female  :  1945  MRN:  [id number]   Visit Number:  [id number]  Admit Date: 2023   Primary Care Physician:  Niki Antony APRN     Subjective     Chief complaint:   Chief Complaint   Patient presents with    Dysuria    Shortness of Breath     History of presenting illness:   Patient is a 78 y.o. female with past medical history significant for chronic diastolic CHF, non-small cell cancer of the right lung, paroxysmal atrial fibrillation, chronic anticoagulation with Eliquis, hyperlipidemia, essential hypertension, COPD, type 2 diabetes mellitus, GERD, that presented to the [facility] emergency department for evaluation of dysuria and dyspnea.    The patient reports over the past 3 to 4 days she has been feeling ill with dyspnea upon exertion, and productive cough with yellow/green sputum and dysuria.  She called her PCP on Friday () who called her in a prescription for a UTI.  The patient does not believe this medication was sent and and therefore has not been taking it.  She denies any fever, chills, wheezing, chest pain, leg edema, abdominal pain, nausea, vomiting, diarrhea, urinary frequency, malodorous urine.  She has been taking her medications as prescribed despite having a decreased appetite.  She has been using her albuterol nebulizer treatments at home around 2 times a day.  She denies any chronic oxygen use.  She is currently alert and oriented to herself, birthday, month, year, and daughter-in-law at bedside.    Upon arrival to the ED, vitals were temperature 98.8 °F, pulse 114, respirations 17, /61, SpO2 89% on room air.    In the emergency department the patient received IV Rocephin 1 g, IV levofloxacin 750 mg,  mL bolus, IV vancomycin    Patient has been admitted to the medical telemetry floor for further evaluation  and treatment.  Patient seen and evaluated in the emergency department room 109.  Patient's daughter-in-law present at bedside during exam.  ---------------------------------------------------------------------------------------------------------------------   Review of Systems   Constitutional:  Positive for appetite change and fatigue. Negative for chills, diaphoresis and fever.   HENT:  Negative for rhinorrhea and sore throat.    Respiratory:  Positive for cough (Productive with yellow/green sputum) and shortness of breath (With exertion). Negative for wheezing.    Cardiovascular:  Negative for chest pain, palpitations and leg swelling.   Gastrointestinal:  Negative for abdominal pain, constipation, diarrhea and vomiting.   Genitourinary:  Positive for dysuria. Negative for frequency.   Neurological:  Negative for dizziness and light-headedness.   Psychiatric/Behavioral:  Negative for confusion.       ---------------------------------------------------------------------------------------------------------------------   Past Medical History:   Diagnosis Date    Arthritis     CHF (congestive heart failure)     Chronic anticoagulation     Eliquis    Chronic kidney disease     stage II/IIIa    Collapsed lung     COPD (chronic obstructive pulmonary disease)     Diabetes mellitus     TYPE 2    Elevated cholesterol     GERD (gastroesophageal reflux disease)     Hyperlipidemia     Hypertension     Non-small cell lung cancer RUL     Sleep apnea     non compliant with CPAP    Stroke 2019    Wears eyeglasses      Past Surgical History:   Procedure Laterality Date    BRONCHOSCOPY Bilateral 02/27/2023    Procedure: BRONCHOSCOPY WITH ENDOBRONCHIAL ULTRASOUND;  Surgeon: Abdulkadir Peter MD;  Location: [facility];  Service: Pulmonary;  Laterality: Bilateral;    BRONCHOSCOPY WITH ION ROBOTIC ASSIST N/A 9/6/2023    Procedure: BRONCHOSCOPY WITH ION ROBOT AND EBUS;  Surgeon: John Clemens MD;  Location: [facility];   Service: Robotics - Pulmonary;  Laterality: N/A;  Ion cath #6 - 0032,  #6 - 0030, cath guide # 0077. EBUS scope removed with balloon intact.    CARDIAC CATHETERIZATION N/A 2017    Procedure: Left Heart Cath;  Surgeon: Jatinder Matias MD;  Location: [facility] CATH INVASIVE LOCATION;  Service:     CARDIAC CATHETERIZATION N/A 2021    Procedure: Left Heart Cath;  Surgeon: Tolu Steinberg MD;  Location: [facility] CATH INVASIVE LOCATION;  Service: Cardiology;  Laterality: N/A;    COLONOSCOPY      ENDOSCOPY      GALLBLADDER SURGERY      PORTACATH PLACEMENT N/A 2023    Procedure: INSERTION OF PORTACATH;  Surgeon: Petr Velásquez MD;  Location:  [facility];  Service: General;  Laterality: N/A;    VENTRAL HERNIA REPAIR N/A 2020    Procedure: VENTRAL HERNIA REPAIR LAPAROSCOPIC WITH DAVINCI ROBOT;  Surgeon: Petr Velásquez MD;  Location:  [facility];  Service: DaVinci;  Laterality: N/A;     Family History   Problem Relation Age of Onset    Heart disease Mother         Rhianna sophia    Heart disease Father     Heart attack Father     Heart failure Father      Social History     Socioeconomic History    Marital status: Single    Number of children: 6   Tobacco Use    Smoking status: Former     Packs/day: 3     Types: Cigarettes     Start date: 4/3/1998     Quit date: 2015     Years since quittin.0     Passive exposure: Past    Smokeless tobacco: Never   Vaping Use    Vaping Use: Never used   Substance and Sexual Activity    Alcohol use: No    Drug use: No    Sexual activity: Never     ---------------------------------------------------------------------------------------------------------------------   Allergies:  Paclitaxel  ---------------------------------------------------------------------------------------------------------------------   Medications below are reported home medications pulling from within the system; at this time, these medications have not been reconciled unless otherwise  specified and are in the verification process for further verifcation as current home medications.    Prior to Admission Medications       Prescriptions Last Dose Informant Patient Reported? Taking?    acetaminophen (TYLENOL) 325 MG tablet   No No    Take 2 tablets by mouth Every 4 (Four) Hours As Needed for Mild Pain.    albuterol sulfate  (90 Base) MCG/ACT inhaler  Self No No    Inhale 2 puffs Every 4 (Four) Hours As Needed for Wheezing or Shortness of Air.    apixaban (ELIQUIS) 5 MG tablet tablet   No No    Take 1 tablet by mouth Every 12 (Twelve) Hours.    atorvastatin (LIPITOR) 20 MG tablet   No No    Take 1 tablet by mouth Daily.    Budeson-Glycopyrrol-Formoterol (Breztri Aerosphere) 160-9-4.8 MCG/ACT aerosol inhaler  Self No No    Inhale 2 puffs 2 (Two) Times a Day.    esomeprazole (nexIUM) 20 MG capsule  Self Yes No    Take 1 capsule by mouth Daily.    ibuprofen (ADVIL,MOTRIN) 600 MG tablet   No No    Take 1 tablet by mouth Every 6 (Six) Hours As Needed for Mild Pain.    ipratropium-albuterol (DUO-NEB) 0.5-2.5 mg/3 ml nebulizer  Self No No    USE 1 VIAL IN NEBULIZER 4 TIMES DAILY - and as needed    lidocaine-prilocaine (EMLA) 2.5-2.5 % cream   No No    Apply to port-a-cath site 30 minutes prior to arrival at infusion center. Cover with plastic wrap.    lisinopril (PRINIVIL,ZESTRIL) 5 MG tablet   No No    Take 1 tablet by mouth Daily.    loratadine (CLARITIN) 10 MG tablet  Self Yes No    Take 1 tablet by mouth Daily As Needed for Allergies.    methocarbamol (Robaxin) 500 MG tablet  Self No No    Take 1 tablet by mouth 3 (Three) Times a Day As Needed for Muscle Spasms.    O2 (OXYGEN)  Self Yes No    Inhale 2 L/min As Needed.    ondansetron (Zofran) 8 MG tablet   No No    Take 1 tablet by mouth Every 8 (Eight) Hours As Needed for Nausea or Vomiting.    pantoprazole (PROTONIX) 40 MG EC tablet  Self Yes No    Take 1 tablet by mouth Daily As Needed (acid reflux).    potassium chloride (K-DUR,KLOR-CON) 20  MEQ CR tablet  Self Yes No    Take 1 tablet by mouth Daily.    prochlorperazine (COMPAZINE) 10 MG tablet   No No    Take 1 tablet by mouth Every 6 (Six) Hours As Needed for Nausea or Vomiting.    Stool Softener 100 MG capsule   Yes No    Take 1 capsule by mouth 2 (two) times a day.    trimethoprim (TRIMPEX) 100 MG tablet  Self No No    Take 1 tablet by mouth 2 (Two) Times a Day.          ---------------------------------------------------------------------------------------------------------------------    Objective     Hospital Scheduled Meds:  doxycycline, 100 mg, Intravenous, Q12H  heparin (porcine), 5,000 Units, Subcutaneous, Q12H  insulin lispro, 2-7 Units, Subcutaneous, 4x Daily AC & at Bedtime  Pharmacy to Dose LevoFLOXacin (LEVAQUIN), , Does not apply, Once  piperacillin-tazobactam, 3.375 g, Intravenous, Once  piperacillin-tazobactam, 3.375 g, Intravenous, Q8H  senna-docusate sodium, 2 tablet, Oral, BID  sodium chloride, 10 mL, Intravenous, Q12H           Current listed hospital scheduled medications may not yet reflect those currently placed in orders that are signed and held, awaiting patient's arrival to floor/unit.    ---------------------------------------------------------------------------------------------------------------------   Vital Signs:  Temp:  [97.6 °F (36.4 °C)-98.8 °F (37.1 °C)] 97.6 °F (36.4 °C)  Heart Rate:  [] 106  Resp:  [17-22] 22  BP: (112-136)/(60-85) 127/69  Mean Arterial Pressure (Non-Invasive) for the past 24 hrs (Last 3 readings):   Noninvasive MAP (mmHg)   01/01/24 0328 89   12/31/23 2220 101   12/31/23 2200 88     SpO2 Percentage    12/31/23 2200 12/31/23 2220 01/01/24 0328   SpO2: 95% 97% 97%     SpO2:  [89 %-97 %] 97 %  on   ;   Device (Oxygen Therapy): room air    Body mass index is 34.98 kg/m².  Wt Readings from Last 3 Encounters:   01/01/24 98.3 kg (216 lb 11.2 oz)   12/26/23 99.6 kg (219 lb 9.6 oz)   12/21/23 102 kg (224 lb 3.2 oz)      ---------------------------------------------------------------------------------------------------------------------   Physical Exam:  Physical Exam  Vitals and nursing note reviewed.   Constitutional:       General: She is not in acute distress.     Appearance: She is well-developed. She is not diaphoretic.   HENT:      Head: Normocephalic and atraumatic.      Right Ear: External ear normal.      Left Ear: External ear normal.      Nose: Nose normal.   Eyes:      Conjunctiva/sclera: Conjunctivae normal.      Pupils: Pupils are equal, round, and reactive to light.   Neck:      Vascular: No JVD.      Trachea: No tracheal deviation.   Cardiovascular:      Rate and Rhythm: Normal rate and regular rhythm.      Heart sounds: Normal heart sounds. No murmur heard.  Pulmonary:      Effort: Pulmonary effort is normal. No respiratory distress.      Breath sounds: Examination of the right-upper field reveals rhonchi. Examination of the left-upper field reveals rhonchi. Examination of the left-lower field reveals rales. Rhonchi and rales present.   Abdominal:      General: Bowel sounds are normal.      Palpations: Abdomen is soft.      Tenderness: There is no abdominal tenderness.   Musculoskeletal:         General: No deformity. Normal range of motion.      Cervical back: Normal range of motion and neck supple.   Skin:     General: Skin is warm and dry.      Coloration: Skin is not pale.      Findings: No erythema or rash.   Neurological:      General: No focal deficit present.      Mental Status: She is alert and oriented to person, place, and time. Mental status is at baseline.      Cranial Nerves: No cranial nerve deficit.   Psychiatric:         Behavior: Behavior normal.         Thought Content: Thought content normal.       ---------------------------------------------------------------------------------------------------------------------  EKG:    Pending cardiology read.  Per my evaluation, atrial fibrillation with  RVR, left bundle branch block, heart rate 121, QTc 477 MS.  When compared to EKG from 10/9/2023, atrial fibrillation has now replaced normal sinus rhythm.  There also appears to be a new left bundle branch block that was not present when compared to prior EKGs from 8/2023, 9/2023, and 10/2023    Telemetry:    Atrial fibrillation with RVR, heart rate 103, SpO2 95% on room air    I have personally reviewed the EKG/Telemetry strip  ---------------------------------------------------------------------------------------------------------------------   Results from last 7 days   Lab Units 12/31/23 1815   HSTROP T ng/L 21*     Results from last 7 days   Lab Units 12/31/23 1815   PROBNP pg/mL 2,015.0*       Results from last 7 days   Lab Units 12/31/23  1741   PH, ARTERIAL pH units 7.424   PO2 ART mm Hg 64.2*   PCO2, ARTERIAL mm Hg 34.0*   HCO3 ART mmol/L 22.2     Results from last 7 days   Lab Units 12/31/23 2115 12/31/23 1815 12/26/23  1043   CRP mg/dL  --  32.78*  --    LACTATE mmol/L 1.4  --   --    WBC 10*3/mm3  --  12.42* 8.94   HEMOGLOBIN g/dL  --  11.6* 11.9*   HEMATOCRIT %  --  36.9 37.8   MCV fL  --  86.6 86.5   MCHC g/dL  --  31.4* 31.5   PLATELETS 10*3/mm3  --  255 168     Results from last 7 days   Lab Units 12/31/23 1815 12/26/23  1043   SODIUM mmol/L 137 140   POTASSIUM mmol/L 3.5 3.8   CHLORIDE mmol/L 99 102   CO2 mmol/L 21.4* 27.5   BUN mg/dL 27* 15   CREATININE mg/dL 1.50* 0.97   CALCIUM mg/dL 9.3 9.0   GLUCOSE mg/dL 245* 141*   ALBUMIN g/dL 3.4* 3.9   BILIRUBIN mg/dL 0.4 0.5   ALK PHOS U/L 157* 133*   AST (SGOT) U/L 18 11   ALT (SGPT) U/L 12 9   Estimated Creatinine Clearance: 37.1 mL/min (A) (by C-G formula based on SCr of 1.5 mg/dL (H)).    Lab Results   Component Value Date    HGBA1C 6.60 (H) 05/12/2020     Lab Results   Component Value Date    TSH 2.300 10/09/2023    FREET4 1.23 10/09/2023       Pain Management Panel          Latest Ref Rng & Units 5/30/2017   Pain Management Panel   Amphetamine,  Urine Qual Negative Negative    Barbiturates Screen, Urine Negative Negative    Benzodiazepine Screen, Urine Negative Negative    Buprenorphine, Screen, Urine Negative Negative    Cocaine Screen, Urine Negative Negative    Methadone Screen , Urine Negative Negative      I have personally reviewed the above laboratory results.   ---------------------------------------------------------------------------------------------------------------------  Imaging Results (Last 7 Days)       Procedure Component Value Units Date/Time    CT Head Without Contrast [[id number]] Collected: 01/01/24 0224     Updated: 01/01/24 0229    Narrative:      EXAMINATION: CT head without contrast     CLINICAL HISTORY: Mental status change. Unknown cause.     COMPARISON: 10/9/2023     TECHNIQUE: Contiguous 3 mm thick slices were obtained from the skull  base to the vertex without contrast. Coronal and sagittal reformats were  performed.     FINDINGS:  Brain volume is normal for age. There is no acute intracranial  hemorrhage. No acute territorial infarct. No extra-axial collection. No  shift of midline structures. No acute calvarial fracture. The visualized  paranasal sinuses and the mastoid air cells are largely clear. Mildly  displaced nasal bone fractures appear similar to the study 10/9/2023.       Impression:      1. No acute intracranial process. If symptoms persist, MRI should be  considered for further evaluation.     This report was finalized on 1/1/2024 2:27 AM by Rajat Avila MD.       CT Chest Without Contrast Diagnostic [[id number]] Collected: 12/31/23 2035     Updated: 12/31/23 2041    Narrative:      INDICATION: Cough.     COMPARISON: CT chest from 12/19/2023     TECHNIQUE: Axial CT images of the chest were obtained without IV  contrast administration. Coronal and sagittal reformations were  reviewed.     FINDINGS:  The thoracic aorta appears normal in caliber. The heart is mildly  enlarged. No pericardial or pleural effusion. No  pneumothorax. Enlarged  mediastinal lymph nodes measuring up to 1.5 cm.     Spiculated nodule in the right upper lobe measuring 1.9 cm.  Atelectasis/airspace disease in the lingula and left lower lobe.  Masslike consolidation in the right lower lobe, difficult evaluation  secondary to motion artifact and lack of IV contrast. Chest port noted.     Degenerative changes in the spine. No acute osseous abnormality evident.     Visualized upper abdominal structures appear unremarkable.       Impression:      Spiculated nodule of the right upper lobe concerning for neoplasm.  Masslike consolidation in the right lower lobe could represent pneumonia  with underlying mass not excluded. Atelectasis/airspace disease in the  left lower lobe and lingula. Mediastinal adenopathy. Follow-up  recommended.        This report was finalized on 12/31/2023 8:39 PM by Alex Pallas, DO.       XR Chest 1 View [[id number]] Collected: 12/31/23 1941     Updated: 12/31/23 1944    Narrative:      INDICATION: Shortness of breath.     TECHNIQUE: Frontal radiograph of the chest.     COMPARISON: 10/9/2023.     FINDINGS:   Cardiomegaly. Small bilateral pleural effusions with underlying  atelectasis/airspace disease. Chronic appearing interstitial lung  markings. No pneumothorax. Chest port catheter tip in the previously  healed. No acute fracture.       Impression:      Small bilateral pleural effusions with underlying atelectasis/airspace  disease.        This report was finalized on 12/31/2023 7:42 PM by Alex Pallas, DO.             I have personally reviewed the above radiology results.     Last Echocardiogram:  Results for orders placed during the hospital encounter of 06/07/21    Adult Transthoracic Echo Complete W/ Cont if Necessary Per Protocol    Interpretation Summary  · The study is technically difficult for diagnosis.  · Normal left ventricular cavity size and wall thickness noted. All left ventricular wall segments contract normally  · Left  ventricular ejection fraction appears to be 61 - 65%.  · Left ventricular diastolic function is consistent with (grade I) impaired relaxation.  · The aortic valve is structurally normal with no regurgitation or stenosis present.  · The mitral valve is structurally normal with no regurgitation or significant stenosis present.  · There is no evidence of pericardial effusion.    ---------------------------------------------------------------------------------------------------------------------    Assessment & Plan      Active Hospital Problems    Diagnosis  POA    Sepsis due to pneumonia [J18.9, A41.9]  Yes       #Sepsis POA secondary to right lower lobe pneumonia, concerning for postobstructive pneumonia (HR> 90, CRP> 2, WBC> 12)  #Acute infection with coronavirus HKU1  #Known history of non-small cell lung cancer of the right upper lobe, on chemoradiation  #Mediastinal adenopathy  #Immunocompromised  #Elevated anion gap  #?  Acute UTI  -CT of the chest without contrast shows a spiculated nodule in the right upper lobe and a masslike consolidation in the right lower lobe likely representing pneumonia along with atelectasis/airspace disease in the left lower lobe and lingula; mediastinal adenopathy  -UA is also grossly abnormal with trace ketones, trace leukocytes, 3-5 WBC, 1+ bacteria, however, dirty sample with 3-12 squamous epithelial cells  -Patient received IV Rocephin, levofloxacin, and vancomycin in the emergency department; we will change antibiotic therapy to pharmacy dose Zosyn and doxycycline  -Obtain MRSA swab, respiratory culture, urine Legionella, and strep pneumo; respiratory panel positive for coronavirus HKU1   -Urine culture and blood cultures pending  -Anion gap is elevated at 1.5; likely secondary to DONNIE; ABG without systemic acidosis  -Gentle maintenance fluids ordered; repeat a.m. CMP and monitor for anion gap closure  -Repeat a.m. CBC and CRP  -Atrovent treatments ordered  -Incentive spirometer  given, encourage use  -Monitor vitals closely    #New left bundle branch block  #NSTEMI, type I versus type II  #Essential hypertension  #Hyperlipidemia  -EKG obtained in the ER shows what appears to be a new LBBB; reviewed EKG with Dr. Interiano; LBBB not present on EKGs from 9/5/23 or 10/9/23  -Repeat EKG  - HS Trop T 21; appears to be at baseline when compared to labs from 10/23  -Suspect type II NSTEMI in setting of DONNIE and demand ischemia from a.fib with RVR   -Pt denies chest pain  -Cardiology has been consulted, further input/assistance is much appreciated     #DONNIE on CKD stage IIIa  -Baseline Cr appears to be around 0.8-1.0; Cr on admission 1.5  -Suspect pre-renal azotemia and/or early ATN from sepsis   -Start gentle mNS @ 50 mL/h  -obtain urine electrolytes  -Repeat AM CMP and monitor renal fxn closely   -Review home medications once reconciled per pharmacy; avoid nephrotoxic agents as much as possible     #Atrial fibrillation with RVR  #Chronic anticoagulation with Eliquis  -Currently in atrial fibrillation, HR around  bpm; will continue to monitor, if HR stays ~ or >110 bpm sustained will give rate lowering agent  -Continue eliquis for stroke prophylaxis   -Review home medications once available   -Start gentle mNS as noted above   -Monitor closely on telemetry     #AMS, ? Metabolic encephalopathy   -CT of the head w/o contrast shows now acute findings   -Currently A&O x 4   -Obtain neurochecks q4 hours   -Continue with treatment as outlined above  -UDS ordered     #Type 2 diabetes mellitus  -Obtain hemoglobin A1c  -SSI ordered; obtain POC glucose 4x daily  before meals and at bedtime; titrate insulin therapy as necessary; hypoglycemia protocol in place  -Review home regimen once reconciled per pharmacy     #Chronic diastolic CHF  -Appears euvolemic on exam   -TTE from 6/7/21 with EF 61-65%  -Monitor for signs of volume overload with daily weights, strict I's and O's       F/E/N: NS 50 mL/h. Replace  electrolytes as necessary. Cardiac, consistent carb diet.   ---------------------------------------------------  DVT Prophylaxis: Eliquis  GI Prophylaxis: Protonix  Activity: Up with assistance, fall precautions; turn every 2 hours    The patient is considered to be a high risk patient due to: Sepsis POA secondary to right lower lobe pneumonia, acute UTI, DONNIE, atrial fibrillation with RVR    INPATIENT status due to the need for care which can only be reasonably provided in an hospital setting such as aggressive/expedited ancillary services and/or consultation services, the necessity for IV medications, close physician monitoring and/or the possible need for procedures.  In such, I feel patient’s risk for adverse outcomes and need for care warrant INPATIENT evaluation and predict the patient’s care encounter to likely last beyond 2 midnights.    Code Status: FULL CODE       I have discussed the patient's assessment and plan with patient, nursing staff, and attending physician      Kae Michael PA-C  Hospitalist Service -- [facility]       01/01/24  03:35 EST    Attending Physician: Izabel Interiano DO

## 2024-01-01 NOTE — ED PROVIDER NOTES
"Subjective   History of Present Illness  Patient is a 78-year-old female that presents with family member for incomplete creased wet sounding cough and patient appearing more short of breath.  Patient's family member also states that yesterday she mentions that she had some burning with urination.  Patient has numerous chronic medical conditions with pertinent positives including non-small cell carcinoma of the right lung, chronic kidney disease CHF, COPD, CAD,and DMII.   Asking patient why she is at the hospital.  She states that \"not quite sure but I think I am confused.\"      Review of Systems    Past Medical History:   Diagnosis Date    Arthritis     CHF (congestive heart failure)     Chronic anticoagulation     Eliquis    Chronic kidney disease     stage II/IIIa    Collapsed lung     COPD (chronic obstructive pulmonary disease)     Diabetes mellitus     TYPE 2    Elevated cholesterol     GERD (gastroesophageal reflux disease)     Hyperlipidemia     Hypertension     Non-small cell lung cancer RUL     Sleep apnea     non compliant with CPAP    Stroke 2019    Wears eyeglasses        Allergies   Allergen Reactions    Paclitaxel Anaphylaxis       Past Surgical History:   Procedure Laterality Date    BRONCHOSCOPY Bilateral 02/27/2023    Procedure: BRONCHOSCOPY WITH ENDOBRONCHIAL ULTRASOUND;  Surgeon: Abdulkadir Peter MD;  Location: [facility] OR;  Service: Pulmonary;  Laterality: Bilateral;    BRONCHOSCOPY WITH ION ROBOTIC ASSIST N/A 9/6/2023    Procedure: BRONCHOSCOPY WITH ION ROBOT AND EBUS;  Surgeon: John Clemens MD;  Location: [facility] ENDOSCOPY;  Service: Robotics - Pulmonary;  Laterality: N/A;  Ion cath #6 - 0032,  #6 - 0030, cath guide # 0077. EBUS scope removed with balloon intact.    CARDIAC CATHETERIZATION N/A 08/22/2017    Procedure: Left Heart Cath;  Surgeon: Jatinder Matias MD;  Location: [facility] CATH INVASIVE LOCATION;  Service:     CARDIAC CATHETERIZATION N/A 11/22/2021    Procedure: Left Heart " Cath;  Surgeon: Tolu Steinberg MD;  Location:  [facility] CATH INVASIVE LOC[facility];  Service: Cardiology;  Laterality: N/A;    COLONOSCOPY      ENDOSCOPY      GALLBLADDER SURGERY      PORTACATH PLACEMENT N/A 2023    Procedure: INSERTION OF PORTACATH;  Surgeon: Petr Velásquez MD;  Location:  [facility] OR;  Service: General;  Laterality: N/A;    VENTRAL HERNIA REPAIR N/A 2020    Procedure: VENTRAL HERNIA REPAIR LAPAROSCOPIC WITH DAVINCI ROBOT;  Surgeon: Petr Velásquez MD;  Location:  [facility] OR;  Service: DaVinci;  Laterality: N/A;       Family History   Problem Relation Age of Onset    Heart disease Mother         Rhianna sophia    Heart disease Father     Heart attack Father     Heart failure Father        Social History     Socioeconomic History    Marital status: Single    Number of children: 6   Tobacco Use    Smoking status: Former     Packs/day: 3     Types: Cigarettes     Start date: 4/3/1998     Quit date: 2015     Years since quittin.0     Passive exposure: Past    Smokeless tobacco: Never   Vaping Use    Vaping Use: Never used   Substance and Sexual Activity    Alcohol use: No    Drug use: No    Sexual activity: Never           Objective   Physical Exam  Vitals and nursing note reviewed.   Constitutional:       General: She is not in acute distress.     Appearance: She is well-developed. She is not diaphoretic.   HENT:      Head: Normocephalic and atraumatic.      Right Ear: External ear normal.      Left Ear: External ear normal.      Nose: Nose normal.   Eyes:      Conjunctiva/sclera: Conjunctivae normal.      Pupils: Pupils are equal, round, and reactive to light.   Neck:      Vascular: No JVD.      Trachea: No tracheal deviation.   Cardiovascular:      Rate and Rhythm: Normal rate and regular rhythm.      Heart sounds: Normal heart sounds. No murmur heard.  Pulmonary:      Effort: No respiratory distress.      Breath sounds: No wheezing.      Comments: Profoundly decreased lung sounds  in the left lung fields with intermittent crackles.  Diminished right lower lung field.  Abdominal:      General: Bowel sounds are normal.      Palpations: Abdomen is soft.      Tenderness: There is no abdominal tenderness.   Musculoskeletal:         General: No deformity. Normal range of motion.      Cervical back: Normal range of motion and neck supple.   Skin:     General: Skin is warm and dry.      Coloration: Skin is not pale.      Findings: No erythema or rash.   Neurological:      Mental Status: She is alert and oriented to person, place, and time.      Cranial Nerves: No cranial nerve deficit.   Psychiatric:         Behavior: Behavior normal.         Thought Content: Thought content normal.         Procedures       Results for orders placed or performed during the hospital encounter of 12/31/23   Respiratory Panel PCR w/COVID-19(SARS-CoV-2) [facility] In-House, NP Swab in UTM/VTM, 2 HR TAT - Swab, Nasopharynx    Specimen: Nasopharynx; Swab   Result Value Ref Range    ADENOVIRUS, PCR Not Detected Not Detected    Coronavirus 229E Not Detected Not Detected    Coronavirus HKU1 Detected (A) Not Detected    Coronavirus NL63 Not Detected Not Detected    Coronavirus OC43 Not Detected Not Detected    COVID19 Not Detected Not Detected - Ref. Range    Human Metapneumovirus Not Detected Not Detected    Human Rhinovirus/Enterovirus Not Detected Not Detected    Influenza A PCR Not Detected Not Detected    Influenza B PCR Not Detected Not Detected    Parainfluenza Virus 1 Not Detected Not Detected    Parainfluenza Virus 2 Not Detected Not Detected    Parainfluenza Virus 3 Not Detected Not Detected    Parainfluenza Virus 4 Not Detected Not Detected    RSV, PCR Not Detected Not Detected    Bordetella pertussis pcr Not Detected Not Detected    Bordetella parapertussis PCR Not Detected Not Detected    Chlamydophila pneumoniae PCR Not Detected Not Detected    Mycoplasma pneumo by PCR Not Detected Not Detected    Comprehensive Metabolic Panel    Specimen: Arm, Left; Blood   Result Value Ref Range    Glucose 245 (H) 65 - 99 mg/dL    BUN 27 (H) 8 - 23 mg/dL    Creatinine 1.50 (H) 0.57 - 1.00 mg/dL    Sodium 137 136 - 145 mmol/L    Potassium 3.5 3.5 - 5.2 mmol/L    Chloride 99 98 - 107 mmol/L    CO2 21.4 (L) 22.0 - 29.0 mmol/L    Calcium 9.3 8.6 - 10.5 mg/dL    Total Protein 7.7 6.0 - 8.5 g/dL    Albumin 3.4 (L) 3.5 - 5.2 g/dL    ALT (SGPT) 12 1 - 33 U/L    AST (SGOT) 18 1 - 32 U/L    Alkaline Phosphatase 157 (H) 39 - 117 U/L    Total Bilirubin 0.4 0.0 - 1.2 mg/dL    Globulin 4.3 gm/dL    A/G Ratio 0.8 g/dL    BUN/Creatinine Ratio 18.0 7.0 - 25.0    Anion Gap 16.6 (H) 5.0 - 15.0 mmol/L    eGFR 35.7 (L) >60.0 mL/min/1.73   BNP    Specimen: Arm, Left; Blood   Result Value Ref Range    proBNP 2,015.0 (H) 0.0 - 1,800.0 pg/mL   Single High Sensitivity Troponin T    Specimen: Arm, Left; Blood   Result Value Ref Range    HS Troponin T 21 (H) <14 ng/L   CBC Auto Differential    Specimen: Arm, Left; Blood   Result Value Ref Range    WBC 12.42 (H) 3.40 - 10.80 10*3/mm3    RBC 4.26 3.77 - 5.28 10*6/mm3    Hemoglobin 11.6 (L) 12.0 - 15.9 g/dL    Hematocrit 36.9 34.0 - 46.6 %    MCV 86.6 79.0 - 97.0 fL    MCH 27.2 26.6 - 33.0 pg    MCHC 31.4 (L) 31.5 - 35.7 g/dL    RDW 18.0 (H) 12.3 - 15.4 %    RDW-SD 57.0 (H) 37.0 - 54.0 fl    MPV 11.2 6.0 - 12.0 fL    Platelets 255 140 - 450 10*3/mm3    Neutrophil % 80.5 (H) 42.7 - 76.0 %    Lymphocyte % 6.0 (L) 19.6 - 45.3 %    Monocyte % 9.1 5.0 - 12.0 %    Eosinophil % 1.5 0.3 - 6.2 %    Basophil % 0.7 0.0 - 1.5 %    Immature Grans % 2.2 (H) 0.0 - 0.5 %    Neutrophils, Absolute 10.00 (H) 1.70 - 7.00 10*3/mm3    Lymphocytes, Absolute 0.74 0.70 - 3.10 10*3/mm3    Monocytes, Absolute 1.13 (H) 0.10 - 0.90 10*3/mm3    Eosinophils, Absolute 0.19 0.00 - 0.40 10*3/mm3    Basophils, Absolute 0.09 0.00 - 0.20 10*3/mm3    Immature Grans, Absolute 0.27 (H) 0.00 - 0.05 10*3/mm3    nRBC 0.0 0.0 - 0.2 /100 WBC    Urinalysis With Culture If Indicated - Urine, Clean Catch    Specimen: Urine, Clean Catch   Result Value Ref Range    Color, UA Dark Yellow (A) Yellow, Straw    Appearance, UA Cloudy (A) Clear    pH, UA 5.5 5.0 - 8.0    Specific Gravity, UA 1.028 1.005 - 1.030    Glucose,  mg/dL (Trace) (A) Negative    Ketones, UA Trace (A) Negative    Bilirubin, UA Negative Negative    Blood, UA Negative Negative    Protein,  mg/dL (2+) (A) Negative    Leuk Esterase, UA Trace (A) Negative    Nitrite, UA Negative Negative    Urobilinogen, UA 1.0 E.U./dL 0.2 - 1.0 E.U./dL   Blood Gas, Arterial With Co-Ox    Specimen: Arterial Blood   Result Value Ref Range    Site Right Radial     Gonzalo's Test Positive     pH, Arterial 7.424 7.350 - 7.450 pH units    pCO2, Arterial 34.0 (L) 35.0 - 45.0 mm Hg    pO2, Arterial 64.2 (L) 83.0 - 108.0 mm Hg    HCO3, Arterial 22.2 20.0 - 26.0 mmol/L    Base Excess, Arterial -1.7 (L) 0.0 - 2.0 mmol/L    O2 Saturation, Arterial 92.2 (L) 94.0 - 99.0 %    Hemoglobin, Blood Gas 11.6 (L) 13.5 - 17.5 g/dL    Hematocrit, Blood Gas 35.5 (L) 38.0 - 51.0 %    Oxyhemoglobin 91.0 (L) 94 - 99 %    Methemoglobin 0.00 0.00 - 3.00 %    Carboxyhemoglobin 1.3 0 - 5 %    A-a DO2 39.8 0.0 - 300.0 mmHg    CO2 Content 23.3 22 - 33 mmol/L    Barometric Pressure for Blood Gas 725 mmHg    Modality Room Air     FIO2 21 %    Ventilator Mode NA     Note Read back and acknowledge     Notified Who DR DUEÑAS // RN     Notified By DAWN     Collected by [id number]     pH, Temp Corrected      pCO2, Temperature Corrected      pO2, Temperature Corrected     Urinalysis, Microscopic Only - Urine, Clean Catch    Specimen: Urine, Clean Catch   Result Value Ref Range    RBC, UA 0-2 None Seen, 0-2 /HPF    WBC, UA 3-5 (A) None Seen, 0-2 /HPF    Bacteria, UA 1+ (A) None Seen /HPF    Squamous Epithelial Cells, UA 13-20 (A) None Seen, 0-2 /HPF    Hyaline Casts, UA 7-12 None Seen /LPF    Methodology Manual Light Microscopy    Lactic Acid,  Plasma    Specimen: Arm, Right; Blood   Result Value Ref Range    Lactate 1.4 0.5 - 2.0 mmol/L   Procalcitonin    Specimen: Arm, Left; Blood   Result Value Ref Range    Procalcitonin 0.64 (H) 0.00 - 0.25 ng/mL   Sedimentation Rate    Specimen: Arm, Left; Blood   Result Value Ref Range    Sed Rate >130 (H) 0 - 30 mm/hr   C-reactive Protein    Specimen: Arm, Left; Blood   Result Value Ref Range    C-Reactive Protein 32.78 (H) 0.00 - 0.50 mg/dL   POC Glucose Once    Specimen: Blood   Result Value Ref Range    Glucose 125 70 - 130 mg/dL   ECG 12 Lead ED Triage Standing Order; SOA   Result Value Ref Range    QT Interval 336 ms    QTC Interval 477 ms   Green Top (Gel)   Result Value Ref Range    Extra Tube Hold for add-ons.    Lavender Top   Result Value Ref Range    Extra Tube hold for add-on    Gold Top - SST   Result Value Ref Range    Extra Tube Hold for add-ons.    Light Blue Top   Result Value Ref Range    Extra Tube Hold for add-ons.            ED Course  ED Course as of 01/01/24 0348   Sun Dec 31, 2023   1720 EKG notes atrial fibrillation.  121 bpm.  Concerns for rapid ventricular response.  Left bundle branch block noted.  QTc 477.  Electronically signed by Nima Cooley DO, 12/31/23, 5:20 PM EST.   [LK]   2207 Reviewed labs with the patient does have acute kidney injury with an increased creatinine.  She received modified sepsis fluid of 500 mL and empirically treated with IV Rocephin.   [LK]   2223 Acute renal insufficiency patient had a normal creatinine of 0.82 months ago.  Creatinine currently 1.5 with a GFR of 35.7.     [LK]   2230 Patient is received Rocephin we will add vancomycin for MRSA coverage and    [LK]      ED Course User Index  [LK] Elizabeth Montez DO                                             Medical Decision Making  Problems Addressed:  Acute cystitis with hematuria: complicated acute illness or injury  Confusion associated with infection: complicated acute illness or  injury  Healthcare-associated pneumonia: complicated acute illness or injury  Non-small cell cancer of right lung: complicated acute illness or injury  Severe sepsis: complicated acute illness or injury    Amount and/or Complexity of Data Reviewed  Labs: ordered.  Radiology: ordered.  ECG/medicine tests: ordered.    Risk  OTC drugs.  Prescription drug management.  Decision regarding hospitalization.        Final diagnoses:   Healthcare-associated pneumonia   Confusion associated with infection   Severe sepsis   Acute cystitis with hematuria   Non-small cell cancer of right lung   Coronavirus infection       ED Disposition  ED Disposition       ED Disposition   Decision to Admit    Condition   --    Comment   Level of Care: Medical Telemetry [23]   Diagnosis: Sepsis due to pneumonia [[id number]]   Admitting Physician: DANY CANALES [[id number]]   Certification: I Certify That Inpatient Hospital Services Are Medically Necessary For Greater Than 2 Midnights                 No follow-up provider specified.       Medication List        Stop      trimethoprim 100 MG tablet  Commonly known as: Elizabeth Narayan DO  01/01/24 0349

## 2024-01-01 NOTE — PROGRESS NOTES
Patient seen and examined, she just came back from the bathroom for BM, no diarrhea, she felt much better, she still notably dyspneic after she got back from the bathroom, she is not on oxygen, son at bedside stated she has an oxygen at home but she rarely uses it.  Her O2 sat on room air after going to the bathroom is 90% and eventually went up to 92.  She is able to complete sentences.  She is not confused.  -Bilateral pneumonia bibasal  -Right upper lobe none small cell carcinoma stage IIIa  -History of COPD with exacerbation  -Chronic paroxysmal atrial fibrillation borderline control  -Essential hypertension  -[place] HKU1 respiratory at infection with probable concomitant bacterial infection  -Former tobacco use    Patient counseled the importance of compliance with oxygen supplementation, monitor heart rate closely, continue doxycycline and Rocephin, adjust heart rate medication as needed.  Neb treatment, change prednisone to Solu-Medrol.

## 2024-01-01 NOTE — PLAN OF CARE
Goal Outcome Evaluation: Patient has been pleasant. Compliant with care. Patient remains on room air, saturations remaining above 90%. Patient ambulated in room, steady gait noted. Patients family at bedside, updated on plan of care. Patients call light in reach.

## 2024-01-02 LAB
ANION GAP SERPL CALCULATED.3IONS-SCNC: 10.3 MMOL/L (ref 5–15)
ANISOCYTOSIS BLD QL: ABNORMAL
BACTERIA SPEC AEROBE CULT: NORMAL
BUN SERPL-MCNC: 22 MG/DL (ref 8–23)
BUN/CREAT SERPL: 19.8 (ref 7–25)
CALCIUM SPEC-SCNC: 9.5 MG/DL (ref 8.6–10.5)
CHLORIDE SERPL-SCNC: 108 MMOL/L (ref 98–107)
CO2 SERPL-SCNC: 21.7 MMOL/L (ref 22–29)
CREAT SERPL-MCNC: 1.11 MG/DL (ref 0.57–1)
CREAT UR-MCNC: 152.1 MG/DL
DEPRECATED RDW RBC AUTO: 57.2 FL (ref 37–54)
EGFRCR SERPLBLD CKD-EPI 2021: 51.3 ML/MIN/1.73
ERYTHROCYTE [DISTWIDTH] IN BLOOD BY AUTOMATED COUNT: 17.6 % (ref 12.3–15.4)
GLUCOSE BLDC GLUCOMTR-MCNC: 250 MG/DL (ref 70–130)
GLUCOSE BLDC GLUCOMTR-MCNC: 383 MG/DL (ref 70–130)
GLUCOSE BLDC GLUCOMTR-MCNC: 397 MG/DL (ref 70–130)
GLUCOSE BLDC GLUCOMTR-MCNC: 423 MG/DL (ref 70–130)
GLUCOSE BLDC GLUCOMTR-MCNC: 469 MG/DL (ref 70–130)
GLUCOSE SERPL-MCNC: 308 MG/DL (ref 65–99)
HCT VFR BLD AUTO: 35.2 % (ref 34–46.6)
HGB BLD-MCNC: 10.8 G/DL (ref 12–15.9)
LYMPHOCYTES # BLD MANUAL: 0.4 10*3/MM3 (ref 0.7–3.1)
LYMPHOCYTES NFR BLD MANUAL: 2 % (ref 5–12)
MCH RBC QN AUTO: 27.1 PG (ref 26.6–33)
MCHC RBC AUTO-ENTMCNC: 30.7 G/DL (ref 31.5–35.7)
MCV RBC AUTO: 88.2 FL (ref 79–97)
MONOCYTES # BLD: 0.13 10*3/MM3 (ref 0.1–0.9)
NEUTROPHILS # BLD AUTO: 6.16 10*3/MM3 (ref 1.7–7)
NEUTROPHILS NFR BLD MANUAL: 91 % (ref 42.7–76)
NEUTS BAND NFR BLD MANUAL: 1 % (ref 0–5)
OVALOCYTES BLD QL SMEAR: ABNORMAL
PLAT MORPH BLD: NORMAL
PLATELET # BLD AUTO: 260 10*3/MM3 (ref 140–450)
PMV BLD AUTO: 10.9 FL (ref 6–12)
POTASSIUM SERPL-SCNC: 4.2 MMOL/L (ref 3.5–5.2)
PROT ?TM UR-MCNC: 67.9 MG/DL
PROT ?TM UR-MCNC: 73.1 MG/DL
PROT/CREAT UR: 446.4 MG/G CREA (ref 0–200)
RBC # BLD AUTO: 3.99 10*6/MM3 (ref 3.77–5.28)
SODIUM SERPL-SCNC: 140 MMOL/L (ref 136–145)
VARIANT LYMPHS NFR BLD MANUAL: 6 % (ref 19.6–45.3)
WBC NRBC COR # BLD AUTO: 6.7 10*3/MM3 (ref 3.4–10.8)

## 2024-01-02 PROCEDURE — 80048 BASIC METABOLIC PNL TOTAL CA: CPT | Performed by: PHYSICIAN ASSISTANT

## 2024-01-02 PROCEDURE — 94761 N-INVAS EAR/PLS OXIMETRY MLT: CPT

## 2024-01-02 PROCEDURE — 99232 SBSQ HOSP IP/OBS MODERATE 35: CPT | Performed by: HOSPITALIST

## 2024-01-02 PROCEDURE — 94799 UNLISTED PULMONARY SVC/PX: CPT

## 2024-01-02 PROCEDURE — 25010000002 PIPERACILLIN SOD-TAZOBACTAM PER 1 G: Performed by: INTERNAL MEDICINE

## 2024-01-02 PROCEDURE — 85007 BL SMEAR W/DIFF WBC COUNT: CPT | Performed by: PHYSICIAN ASSISTANT

## 2024-01-02 PROCEDURE — 84156 ASSAY OF PROTEIN URINE: CPT | Performed by: PHYSICIAN ASSISTANT

## 2024-01-02 PROCEDURE — 82948 REAGENT STRIP/BLOOD GLUCOSE: CPT

## 2024-01-02 PROCEDURE — 63710000001 INSULIN LISPRO (HUMAN) PER 5 UNITS: Performed by: HOSPITALIST

## 2024-01-02 PROCEDURE — 94664 DEMO&/EVAL PT USE INHALER: CPT

## 2024-01-02 PROCEDURE — 82570 ASSAY OF URINE CREATININE: CPT | Performed by: PHYSICIAN ASSISTANT

## 2024-01-02 PROCEDURE — 97161 PT EVAL LOW COMPLEX 20 MIN: CPT

## 2024-01-02 PROCEDURE — 85025 COMPLETE CBC W/AUTO DIFF WBC: CPT | Performed by: PHYSICIAN ASSISTANT

## 2024-01-02 PROCEDURE — 63710000001 INSULIN LISPRO (HUMAN) PER 5 UNITS: Performed by: INTERNAL MEDICINE

## 2024-01-02 PROCEDURE — 25010000002 METHYLPREDNISOLONE PER 40 MG: Performed by: HOSPITALIST

## 2024-01-02 RX ORDER — NICOTINE POLACRILEX 4 MG
15 LOZENGE BUCCAL
Status: DISCONTINUED | OUTPATIENT
Start: 2024-01-02 | End: 2024-01-04 | Stop reason: HOSPADM

## 2024-01-02 RX ORDER — INSULIN LISPRO 100 [IU]/ML
2-9 INJECTION, SOLUTION INTRAVENOUS; SUBCUTANEOUS
Status: DISCONTINUED | OUTPATIENT
Start: 2024-01-02 | End: 2024-01-04 | Stop reason: HOSPADM

## 2024-01-02 RX ORDER — DEXTROSE MONOHYDRATE 25 G/50ML
25 INJECTION, SOLUTION INTRAVENOUS
Status: DISCONTINUED | OUTPATIENT
Start: 2024-01-02 | End: 2024-01-04 | Stop reason: HOSPADM

## 2024-01-02 RX ADMIN — PIPERACILLIN SODIUM AND TAZOBACTAM SODIUM 3.38 G: 3; .375 INJECTION, POWDER, LYOPHILIZED, FOR SOLUTION INTRAVENOUS at 11:20

## 2024-01-02 RX ADMIN — IPRATROPIUM BROMIDE 0.5 MG: 0.5 SOLUTION RESPIRATORY (INHALATION) at 19:49

## 2024-01-02 RX ADMIN — APIXABAN 5 MG: 5 TABLET, FILM COATED ORAL at 20:11

## 2024-01-02 RX ADMIN — DOCUSATE SODIUM 50 MG AND SENNOSIDES 8.6 MG 2 TABLET: 8.6; 5 TABLET, FILM COATED ORAL at 08:13

## 2024-01-02 RX ADMIN — PANTOPRAZOLE SODIUM 40 MG: 40 TABLET, DELAYED RELEASE ORAL at 05:05

## 2024-01-02 RX ADMIN — ACETAMINOPHEN 650 MG: 325 TABLET ORAL at 20:11

## 2024-01-02 RX ADMIN — HYDROXYZINE HYDROCHLORIDE 25 MG: 25 TABLET, FILM COATED ORAL at 20:11

## 2024-01-02 RX ADMIN — ARFORMOTEROL TARTRATE 15 MCG: 15 SOLUTION RESPIRATORY (INHALATION) at 06:55

## 2024-01-02 RX ADMIN — IPRATROPIUM BROMIDE 0.5 MG: 0.5 SOLUTION RESPIRATORY (INHALATION) at 06:56

## 2024-01-02 RX ADMIN — INSULIN LISPRO 4 UNITS: 100 INJECTION, SOLUTION INTRAVENOUS; SUBCUTANEOUS at 08:13

## 2024-01-02 RX ADMIN — BUDESONIDE INHALATION 0.5 MG: 0.5 SUSPENSION RESPIRATORY (INHALATION) at 19:49

## 2024-01-02 RX ADMIN — METHYLPREDNISOLONE SODIUM SUCCINATE 40 MG: 40 INJECTION, POWDER, FOR SOLUTION INTRAMUSCULAR; INTRAVENOUS at 03:06

## 2024-01-02 RX ADMIN — BUDESONIDE INHALATION 0.5 MG: 0.5 SUSPENSION RESPIRATORY (INHALATION) at 06:55

## 2024-01-02 RX ADMIN — INSULIN LISPRO 8 UNITS: 100 INJECTION, SOLUTION INTRAVENOUS; SUBCUTANEOUS at 11:20

## 2024-01-02 RX ADMIN — INSULIN LISPRO 8 UNITS: 100 INJECTION, SOLUTION INTRAVENOUS; SUBCUTANEOUS at 20:10

## 2024-01-02 RX ADMIN — PIPERACILLIN SODIUM AND TAZOBACTAM SODIUM 3.38 G: 3; .375 INJECTION, POWDER, LYOPHILIZED, FOR SOLUTION INTRAVENOUS at 17:20

## 2024-01-02 RX ADMIN — DOCUSATE SODIUM 50 MG AND SENNOSIDES 8.6 MG 2 TABLET: 8.6; 5 TABLET, FILM COATED ORAL at 20:11

## 2024-01-02 RX ADMIN — IPRATROPIUM BROMIDE 0.5 MG: 0.5 SOLUTION RESPIRATORY (INHALATION) at 00:59

## 2024-01-02 RX ADMIN — INSULIN LISPRO 9 UNITS: 100 INJECTION, SOLUTION INTRAVENOUS; SUBCUTANEOUS at 17:18

## 2024-01-02 RX ADMIN — ARFORMOTEROL TARTRATE 15 MCG: 15 SOLUTION RESPIRATORY (INHALATION) at 19:49

## 2024-01-02 RX ADMIN — Medication 10 ML: at 20:10

## 2024-01-02 RX ADMIN — ATORVASTATIN CALCIUM 20 MG: 20 TABLET, FILM COATED ORAL at 08:13

## 2024-01-02 RX ADMIN — METHYLPREDNISOLONE SODIUM SUCCINATE 40 MG: 40 INJECTION, POWDER, FOR SOLUTION INTRAMUSCULAR; INTRAVENOUS at 17:19

## 2024-01-02 RX ADMIN — DOXYCYCLINE 100 MG: 100 INJECTION, POWDER, LYOPHILIZED, FOR SOLUTION INTRAVENOUS at 17:17

## 2024-01-02 RX ADMIN — Medication 10 ML: at 08:13

## 2024-01-02 RX ADMIN — PIPERACILLIN SODIUM AND TAZOBACTAM SODIUM 3.38 G: 3; .375 INJECTION, POWDER, LYOPHILIZED, FOR SOLUTION INTRAVENOUS at 03:06

## 2024-01-02 RX ADMIN — DOXYCYCLINE 100 MG: 100 INJECTION, POWDER, LYOPHILIZED, FOR SOLUTION INTRAVENOUS at 04:09

## 2024-01-02 RX ADMIN — ACETAMINOPHEN 650 MG: 325 TABLET ORAL at 05:05

## 2024-01-02 RX ADMIN — APIXABAN 5 MG: 5 TABLET, FILM COATED ORAL at 08:13

## 2024-01-02 NOTE — PLAN OF CARE
Goal Outcome Evaluation: Patient has been very pleasant. Compliant with care. Patient remains on room air, saturations remaining above 90%. Patient ambulated in room, steady gait noted. Patient resting in bed. Call light in reach.

## 2024-01-02 NOTE — PLAN OF CARE
Goal Outcome Evaluation:              Outcome Evaluation: Pt seen for therapy evaluation this date, pleasant and cooperative with therapy. Pt may benefit from therapeutic intervention during hospital stay to improve mobility and LE strength for functional mobility and independence.      Anticipated Discharge Disposition (PT): home with supervision, home with assist

## 2024-01-02 NOTE — CONSULTS
Palliative Care Initial Consult     Attending Physician: Tara Nino MD  Referring Provider: Tara Nino MD    assistance with advance directives, assistance with clarification of goals of care, and psychosocial support  Code Status:   Code Status and Medical Interventions:   Ordered at: 01/01/24 0109     Code Status (Patient has no pulse and is not breathing):    CPR (Attempt to Resuscitate)     Medical Interventions (Patient has pulse or is breathing):    Full Support      Advanced Directives: Advance Directive Status: Patient has advance directive, copy in chart   Healthcare surrogate: Son HERMINIO Cummings and daughter HERMINIO Hart Lay  Goals of Care: I was able to have a conversation with Mayra and her son and HERMINIO Leone to discuss Goals of care. Mayra confirms that she would want to be resuscitated and to be placed on the ventilator. I discussed with her and son if they discussed long term goals and she and Parth state that she would not want to be on vent or to have a trach and peg.    HPI:  Mayra Hays is a 78 y.o. female admitted on 12/31/23 due to dysuria and shortness of breath. Mayra has a medical history of chronic diastolic CHF, non-small cell cancer of the right lung, paroxysmal atrial fibrillation, chronic anticoagulation with Eliquis, hyperlipidemia, essential hypertension, COPD, type 2 diabetes mellitus, GERD. Mayra had been experiencing dyspnea upon exertion for several days prior to admission, with productive cough.        ROS: Negative except as above in HPI.     Past Medical History:   Diagnosis Date    Arthritis     CHF (congestive heart failure)     Chronic anticoagulation     Eliquis    Chronic kidney disease     stage II/IIIa    Collapsed lung     COPD (chronic obstructive pulmonary disease)     Diabetes mellitus     TYPE 2    Elevated cholesterol     GERD (gastroesophageal reflux disease)     Hyperlipidemia     Hypertension     Non-small cell lung cancer RUL     Sleep  apnea     non compliant with CPAP    Stroke 2019    Wears eyeglasses      Past Surgical History:   Procedure Laterality Date    BRONCHOSCOPY Bilateral 2023    Procedure: BRONCHOSCOPY WITH ENDOBRONCHIAL ULTRASOUND;  Surgeon: Abdulkadir Peter MD;  Location:  [facility];  Service: Pulmonary;  Laterality: Bilateral;    BRONCHOSCOPY WITH ION ROBOTIC ASSIST N/A 2023    Procedure: BRONCHOSCOPY WITH ION ROBOT AND EBUS;  Surgeon: John Clemens MD;  Location:  [facility];  Service: Robotics - Pulmonary;  Laterality: N/A;  Ion cath #6 - 0032,  #6 - 0030, cath guide # 0077. EBUS scope removed with balloon intact.    CARDIAC CATHETERIZATION N/A 2017    Procedure: Left Heart Cath;  Surgeon: Jatinder Matias MD;  [facility];  Service:     CARDIAC CATHETERIZATION N/A 2021    Procedure: Left Heart Cath;  Surgeon: Tolu Steinberg MD;  [facility];  Service: Cardiology;  Laterality: N/A;    COLONOSCOPY      ENDOSCOPY      GALLBLADDER SURGERY      PORTACATH PLACEMENT N/A 2023    Procedure: INSERTION OF PORTACATH;  Surgeon: Petr Velásquez MD;  Location:  [facility];  Service: General;  Laterality: N/A;    VENTRAL HERNIA REPAIR N/A 2020    Procedure: VENTRAL HERNIA REPAIR LAPAROSCOPIC WITH DAVINCI ROBOT;  Surgeon: Petr Velásquez MD;  Location:  [facility];  Service: DaVinci;  Laterality: N/A;     Social History     Socioeconomic History    Marital status: Single    Number of children: 6   Tobacco Use    Smoking status: Former     Packs/day: 3     Types: Cigarettes     Start date: 4/3/1998     Quit date: 2015     Years since quittin.0     Passive exposure: Past    Smokeless tobacco: Never   Vaping Use    Vaping Use: Never used   Substance and Sexual Activity    Alcohol use: No    Drug use: No    Sexual activity: Never     Family History   Problem Relation Age of Onset    Heart disease Mother         Rhianna cortes     Heart disease Father     Heart attack Father     Heart failure Father        Allergies   Allergen Reactions    Paclitaxel Anaphylaxis       Current Facility-Administered Medications   Medication Dose Route Frequency Provider Last Rate Last Admin    acetaminophen (TYLENOL) tablet 650 mg  650 mg Oral Q6H PRN Kae Michael PA-C   650 mg at 01/02/24 0505    apixaban (ELIQUIS) tablet 5 mg  5 mg Oral Q12H O'Mary Mac, PA   5 mg at 01/02/24 0813    arformoterol (BROVANA) nebulizer solution 15 mcg  15 mcg Nebulization BID - RT Tara Nino MD   15 mcg at 01/02/24 0655    atorvastatin (LIPITOR) tablet 20 mg  20 mg Oral Daily O'Mary Mac PA   20 mg at 01/02/24 0813    sennosides-docusate (PERICOLACE) 8.6-50 MG per tablet 2 tablet  2 tablet Oral BID Izabel Interiano DO   2 tablet at 01/02/24 0813    And    polyethylene glycol (MIRALAX) packet 17 g  17 g Oral Daily PRN Izabel Interiano DO        And    bisacodyl (DULCOLAX) EC tablet 5 mg  5 mg Oral Daily PRN Izabel Interiano DO        And    bisacodyl (DULCOLAX) suppository 10 mg  10 mg Rectal Daily PRN Izabel Interiano DO        budesonide (PULMICORT) nebulizer solution 0.5 mg  0.5 mg Nebulization BID - RT Tara Nino MD   0.5 mg at 01/02/24 0655    calcium carbonate (TUMS) chewable tablet 500 mg (200 mg elemental)  2 tablet Oral TID PRN Kae Michael PA-C        dextrose (D50W) (25 g/50 mL) IV injection 25 g  25 g Intravenous Q15 Min PRN Izabel Interiano DO        dextrose (D50W) (25 g/50 mL) IV injection 25 g  25 g Intravenous Q15 Min PRN Tara Nino MD        dextrose (GLUTOSE) oral gel 15 g  15 g Oral Q15 Min PRN Izabel Interiano DO        dextrose (GLUTOSE) oral gel 15 g  15 g Oral Q15 Min PRN Tara Nino MD        doxycycline (VIBRAMYCIN) 100 mg in sodium chloride 0.9 % 100 mL IVPB-VTB  100 mg Intravenous Q12H Izabel Interiano DO   100 mg at 01/02/24 0409    glucagon HCl  (Diagnostic) injection 1 mg  1 mg Intramuscular Q15 Min PRN Izabel Interiano DO        guaiFENesin-dextromethorphan (ROBITUSSIN DM) 100-10 MG/5ML syrup 5 mL  5 mL Oral Q4H PRN Kae Michael PA-C        hydrOXYzine (ATARAX) tablet 25 mg  25 mg Oral TID PRN Kae Michael PA-C        hydrOXYzine (ATARAX) tablet 25 mg  25 mg Oral Nightly MARCIO'Mary Mac PA   25 mg at 01/01/24 2101    Influenza Vac High-Dose Quad (FLUZONE HIGH DOSE) injection 0.7 mL  0.7 mL Intramuscular Once Kae Michael PA-C        Insulin Lispro (humaLOG) injection 2-9 Units  2-9 Units Subcutaneous 4x Daily AC & at Bedtime Tara Nino MD   8 Units at 01/02/24 1120    ipratropium (ATROVENT) nebulizer solution 0.5 mg  0.5 mg Nebulization 4x Daily - RT Kae Michael PA-C   0.5 mg at 01/02/24 0656    ipratropium-albuterol (DUO-NEB) nebulizer solution 3 mL  3 mL Nebulization Q6H PRN Izabel Interiano DO        melatonin tablet 10 mg  10 mg Oral Nightly PRN Kae Michael PA-C   10 mg at 01/01/24 2100    methylPREDNISolone sodium succinate (SOLU-Medrol) injection 40 mg  40 mg Intravenous Q12H Tara Nino MD   40 mg at 01/02/24 0306    nitroglycerin (NITROSTAT) SL tablet 0.4 mg  0.4 mg Sublingual Q5 Min PRN Izabel Interiano DO        pantoprazole (PROTONIX) EC tablet 40 mg  40 mg Oral Q AM Kae Michael PA-C   40 mg at 01/02/24 0505    piperacillin-tazobactam (ZOSYN) IVPB 3.375 g in 100 mL NS VTB  3.375 g Intravenous Q8H Izabel Interiano DO   3.375 g at 01/02/24 1120    sodium chloride 0.9 % flush 10 mL  10 mL Intravenous PRN Laisha, Izabel Alcides, DO        sodium chloride 0.9 % flush 10 mL  10 mL Intravenous Q12H Izabel Interiano, DO   10 mL at 01/02/24 0813    sodium chloride 0.9 % flush 10 mL  10 mL Intravenous PRN Izabel Interiano, DO        sodium chloride 0.9 % infusion 40 mL  40 mL Intravenous PRN Izabel Interiano, DO              acetaminophen     "senna-docusate sodium **AND** polyethylene glycol **AND** bisacodyl **AND** bisacodyl    calcium carbonate    dextrose    dextrose    dextrose    dextrose    glucagon (human recombinant)    guaiFENesin-dextromethorphan    hydrOXYzine    ipratropium-albuterol    melatonin    nitroglycerin    sodium chloride    sodium chloride    sodium chloride    Current medication reviewed for route, type, dose and frequency and are current per MAR.    Palliative Performance Scale Score:     /65 (BP Location: Left arm, Patient Position: Lying)   Pulse 104   Temp 97.4 °F (36.3 °C) (Oral)   Resp 20   Ht 167.6 cm (66\")   Wt 99 kg (218 lb 3.2 oz)   SpO2 94%   BMI 35.22 kg/m²     Intake/Output Summary (Last 24 hours) at 1/2/2024 1615  Last data filed at 1/2/2024 0345  Gross per 24 hour   Intake 460 ml   Output 400 ml   Net 60 ml       PE:  General Appearance:    Chronically ill appearing, alert, cooperative, NAD   HEENT:    NC/AT, without obvious abnormality, EOMI, anicteric    Neck:   supple, trachea midline, no JVD   Lungs:     Unlabored respirations, no wheezing rhonchi or rales noted on RA, diminished in BL bases.    Heart:    Regular rate, irregular rhythm, normal S1 and S2, no M/R/G   Abdomen:     Soft, NT, ND, NABS    Extremities:   Moves all extremities, no edema   Pulses:   Pulses palpable and equal bilaterally   Skin:   Warm, dry   Neurologic:   A/Ox3, cooperative   Psych:   Calm, appropriate       Labs:   Results from last 7 days   Lab Units 01/02/24  0059   WBC 10*3/mm3 6.70   HEMOGLOBIN g/dL 10.8*   HEMATOCRIT % 35.2   PLATELETS 10*3/mm3 260     Results from last 7 days   Lab Units 01/02/24  0059   SODIUM mmol/L 140   POTASSIUM mmol/L 4.2   CHLORIDE mmol/L 108*   CO2 mmol/L 21.7*   BUN mg/dL 22   CREATININE mg/dL 1.11*   GLUCOSE mg/dL 308*   CALCIUM mg/dL 9.5     Results from last 7 days   Lab Units 01/02/24  0059 01/01/24  0401   SODIUM mmol/L 140 139   POTASSIUM mmol/L 4.2 3.7   CHLORIDE mmol/L 108* 103   CO2 " mmol/L 21.7* 18.1*   BUN mg/dL 22 21   CREATININE mg/dL 1.11* 1.05*   CALCIUM mg/dL 9.5 8.4*   BILIRUBIN mg/dL  --  0.3   ALK PHOS U/L  --  115   ALT (SGPT) U/L  --  9   AST (SGOT) U/L  --  14   GLUCOSE mg/dL 308* 138*     Imaging Results (Last 72 Hours)       Procedure Component Value Units Date/Time    CT Head Without Contrast [[id number]] Collected: 01/01/24 0224     Updated: 01/01/24 0229    Narrative:      EXAMINATION: CT head without contrast     CLINICAL HISTORY: Mental status change. Unknown cause.     COMPARISON: 10/9/2023     TECHNIQUE: Contiguous 3 mm thick slices were obtained from the skull  base to the vertex without contrast. Coronal and sagittal reformats were  performed.     FINDINGS:  Brain volume is normal for age. There is no acute intracranial  hemorrhage. No acute territorial infarct. No extra-axial collection. No  shift of midline structures. No acute calvarial fracture. The visualized  paranasal sinuses and the mastoid air cells are largely clear. Mildly  displaced nasal bone fractures appear similar to the study 10/9/2023.       Impression:      1. No acute intracranial process. If symptoms persist, MRI should be  considered for further evaluation.     This report was finalized on 1/1/2024 2:27 AM by Rajat Avila MD.       CT Chest Without Contrast Diagnostic [[id number]] Collected: 12/31/23 2035     Updated: 12/31/23 2041    Narrative:      INDICATION: Cough.     COMPARISON: CT chest from 12/19/2023     TECHNIQUE: Axial CT images of the chest were obtained without IV  contrast administration. Coronal and sagittal reformations were  reviewed.     FINDINGS:  The thoracic aorta appears normal in caliber. The heart is mildly  enlarged. No pericardial or pleural effusion. No pneumothorax. Enlarged  mediastinal lymph nodes measuring up to 1.5 cm.     Spiculated nodule in the right upper lobe measuring 1.9 cm.  Atelectasis/airspace disease in the lingula and left lower lobe.  Masslike consolidation  in the right lower lobe, difficult evaluation  secondary to motion artifact and lack of IV contrast. Chest port noted.     Degenerative changes in the spine. No acute osseous abnormality evident.     Visualized upper abdominal structures appear unremarkable.       Impression:      Spiculated nodule of the right upper lobe concerning for neoplasm.  Masslike consolidation in the right lower lobe could represent pneumonia  with underlying mass not excluded. Atelectasis/airspace disease in the  left lower lobe and lingula. Mediastinal adenopathy. Follow-up  recommended.        This report was finalized on 12/31/2023 8:39 PM by Alex Pallas, DO.       XR Chest 1 View [[id number]] Collected: 12/31/23 1941     Updated: 12/31/23 1944    Narrative:      INDICATION: Shortness of breath.     TECHNIQUE: Frontal radiograph of the chest.     COMPARISON: 10/9/2023.     FINDINGS:   Cardiomegaly. Small bilateral pleural effusions with underlying  atelectasis/airspace disease. Chronic appearing interstitial lung  markings. No pneumothorax. Chest port catheter tip in the previously  healed. No acute fracture.       Impression:      Small bilateral pleural effusions with underlying atelectasis/airspace  disease.        This report was finalized on 12/31/2023 7:42 PM by Alex Pallas, DO.               Diagnostics: Reviewed    A: Mayra Hays is a 78 y.o. female admitted on 12/31/23 due to dysuria and shortness of breath. Mayra has a medical history of chronic diastolic CHF, non-small cell cancer of the right lung, paroxysmal atrial fibrillation, chronic anticoagulation with Eliquis, hyperlipidemia, essential hypertension, COPD, type 2 diabetes mellitus, GERD. Mayra had been experiencing dyspnea upon exertion for several days prior to admission, with productive cough.         P:   Palliative consulted for GOC/ACP, pain management and support for pt and family. I was able to have a conversation with Mayra and her son and HERMINIO Leone to  discuss Goals of care. Mayra confirms that she would want to be resuscitated and to be placed on the ventilator. I discussed with her and son if they discussed long term goals and she and Parth state that she would not want to be on vent or to have a trach and peg. Mayra was only c/o of back pain, which she states is her normal and only take Tylenol for it and states that it is effective. I discussed Mayra with Dr Nino.      We appreciate the consult and the opportunity to participate in Mayra Hays's care. We will continue to follow along. Please do not hesitate to contact us regarding further symptom management or goals of care needs, including after hours or on weekends via our on call provider at [phone].     Time: 55 minutes spent reviewing medical and medication records, assessing and examining patient, discussing with family, answering questions, providing some guidance about a plan and documentation of care, and coordinating care with other healthcare members, with > 50% time spent face to face.     Jessica Johnson, APRN    1/2/2024

## 2024-01-02 NOTE — THERAPY EVALUATION
Acute Care - Physical Therapy Initial Evaluation   [facility]     Patient Name: Mayra Hays  : 1945  MRN: [id number]  Today's Date: 2024   Onset of Illness/Injury or Date of Surgery: 23  Visit Dx:     ICD-10-CM ICD-9-CM   1. Healthcare-associated pneumonia  J18.9 486   2. Confusion associated with infection  B99.9 136.9    R41.0 298.9   3. Severe sepsis  A41.9 038.9    R65.20 995.92   4. Acute cystitis with hematuria  N30.01 595.0   5. Non-small cell cancer of right lung  C34.91 162.9   6. Coronavirus infection  B34.2 079.89     Patient Active Problem List   Diagnosis    Chronic heart failure with preserved ejection fraction    Essential hypertension    Type 2 diabetes mellitus    Abnormal nuclear stress test with moderate to large size anteroapical and lateral wall myocardial ischemia.    Paroxysmal atrial fibrillation    Nocturnal hypoxia    Ventral hernia without obstruction or gangrene    Chronic obstructive pulmonary disease    Former smoker    Gait instability    Chronic respiratory failure with hypoxia    Cigarette nicotine dependence in remission    Acute anemia    Iron deficiency anemia    Malabsorption due to intolerance, not elsewhere classified    Lung mass    Other specified anemias    Primary cancer of right upper lobe of lung    Non-small cell cancer of right lung    Port-A-Cath in place    Hyperlipidemia LDL goal <100    Sepsis due to pneumonia     Past Medical History:   Diagnosis Date    Arthritis     CHF (congestive heart failure)     Chronic anticoagulation     Eliquis    Chronic kidney disease     stage II/IIIa    Collapsed lung     COPD (chronic obstructive pulmonary disease)     Diabetes mellitus     TYPE 2    Elevated cholesterol     GERD (gastroesophageal reflux disease)     Hyperlipidemia     Hypertension     Non-small cell lung cancer RUL     Sleep apnea     non compliant with CPAP    Stroke 2019    Wears eyeglasses      Past Surgical History:   Procedure Laterality Date     BRONCHOSCOPY Bilateral 02/27/2023    Procedure: BRONCHOSCOPY WITH ENDOBRONCHIAL ULTRASOUND;  Surgeon: Abdulkadir Peter MD;  Location:  [facility];  Service: Pulmonary;  Laterality: Bilateral;    BRONCHOSCOPY WITH ION ROBOTIC ASSIST N/A 9/6/2023    Procedure: BRONCHOSCOPY WITH ION ROBOT AND EBUS;  Surgeon: John Clemens MD;  Location:  [facility];  Service: Robotics - Pulmonary;  Laterality: N/A;  Ion cath #6 - 0032,  #6 - 0030, cath guide # 0077. EBUS scope removed with balloon intact.    CARDIAC CATHETERIZATION N/A 08/22/2017    Procedure: Left Heart Cath;  Surgeon: Jatinder Matias MD;  [facility];  Service:     CARDIAC CATHETERIZATION N/A 11/22/2021    Procedure: Left Heart Cath;  Surgeon: Tolu Steinberg MD;  [facility];  Service: Cardiology;  Laterality: N/A;    COLONOSCOPY      ENDOSCOPY      GALLBLADDER SURGERY      PORTACATH PLACEMENT N/A 9/29/2023    Procedure: INSERTION OF PORTACATH;  Surgeon: Petr Velásquez MD;  Location:  [facility];  Service: General;  Laterality: N/A;    VENTRAL HERNIA REPAIR N/A 05/14/2020    Procedure: VENTRAL HERNIA REPAIR LAPAROSCOPIC WITH DAVINCI ROBOT;  Surgeon: Petr Velásquez MD;  Location:  [facility];  Service: DaVinci;  Laterality: N/A;     PT Assessment (last 12 hours)       PT Evaluation and Treatment       Row Name 01/02/24 1030          Physical Therapy Time and Intention    Document Type evaluation  -KH     Mode of Treatment physical therapy  -     Patient Effort good  -     Comment Pt seen for therapy evaluation this date, pleasant and cooperative with therapy. Pt states she lives at home with son. She reports she was (I) with PLOF and had no falls. Pt has not been compliant with NC as hospitalist was in room and informed pt she needed to be wearing it especially with ambulation. Pt was not wearing NC with PT in room.  -       Row Name 01/02/24 1030          General Information     Patient Profile Reviewed yes  -KH     Onset of Illness/Injury or Date of Surgery 12/31/23  -     Referring Physician Dr. Wu  -     Patient Observations alert;cooperative;agree to therapy  -     Patient/Family/Caregiver Comments/Observations Staff reports pt was getting up from bed  -     General Observations of Patient Pt seen supine/sitting in bed this date, pleasant and willing to work with therapy  -     Equipment Currently Used at Home none  based on pt report  -     Existing Precautions/Restrictions fall  -     Equipment Issued to Patient gait belt  -       Row Name 01/02/24 1030          Living Environment    Current Living Arrangements home  -     People in Home child(eligio), adult  -       Row Name 01/02/24 1030          Cognition    Orientation Status (Cognition) oriented to;person;place;time  -     Personal Safety Interventions gait belt;supervised activity;nonskid shoes/slippers when out of bed  -       Row Name 01/02/24 1030          Range of Motion Comprehensive    Comment, General Range of Motion A/PROM grossly functional  -[facility] Name 01/02/24 1030          Strength Comprehensive (MMT)    Comment, General Manual Muscle Testing (MMT) Assessment Grossly MMT of 4/5  -[facility] Name 01/02/24 1030          Bed Mobility    Bed Mobility supine-sit  -     Supine-Sit Grundy (Bed Mobility) modified independence  -[facility] Name 01/02/24 1030          Transfers    Transfers sit-stand transfer;stand-sit transfer  -[facility] Name 01/02/24 1030          Sit-Stand Transfer    Sit-Stand [place] (Transfers) contact guard;standby assist;supervision  -       Row Name 01/02/24 1030          Stand-Sit Transfer    Stand-Sit Grundy (Transfers) standby assist;supervision;contact guard  -[facility] Name 01/02/24 1030          Gait/Stairs (Locomotion)    Gait/Stairs Locomotion gait/ambulation independence  -     Grundy Level (Gait) contact guard;standby  assist  -     Patient was able to Ambulate yes  -     Distance in Feet (Gait) Grossly 10' total  -       Row Name 01/02/24 1030          Balance    Comment, Balance Not formally tested, however pt demonstrates some instability/fall risk with ambulation  -       Row Name 01/02/24 1030          Plan of Care Review    Outcome Evaluation Pt seen for therapy evaluation this date, pleasant and cooperative with therapy. Pt may benefit from therapeutic intervention during hospital stay to improve mobility and LE strength for functional mobility and independence.  -       Row Name 01/02/24 1030          Positioning and Restraints    Pre-Treatment Position in bed  -     Post Treatment Position bed  -     In Bed sitting EOB;call light within reach;exit alarm on  -       Row Name 01/02/24 1030          Therapy Assessment/Plan (PT)    Functional Level at Time of Evaluation (PT) Recommended CGA with out of bed activity, possible close SBA  -     PT Diagnosis (PT) Impaired/decreased functional mobility; fall risk  -     Rehab Potential (PT) --  fair/good  -     Criteria for Skilled Interventions Met (PT) yes  -KH     Therapy Frequency (PT) 2 times/wk  2-5x/week, PRN, as available  -     Predicted Duration of Therapy Intervention (PT) length of stay, until D/C  -       Row Name 01/02/24 1030          Physical Therapy Goals    Gait Training Goal Selection (PT) gait training, PT goal 1  -       Row Name 01/02/24 1030          Gait Training Goal 1 (PT)    Activity/Assistive Device (Gait Training Goal 1, PT) gait (walking locomotion);assistive device use;cane, quad  -     Dundy Level (Gait Training Goal 1, PT) modified independence;independent  -     Distance (Gait Training Goal 1, PT) 50'  -     Time Frame (Gait Training Goal 1, PT) long term goal (LTG)  -               User Key  (r) = Recorded By, (t) = Taken By, (c) = Cosigned By      Initials Name Provider Type    Bonnie Dorado, PT  Physical Therapist                      PT Recommendation and Plan  Anticipated Discharge Disposition (PT): home with supervision, home with assist  Planned Therapy Interventions (PT): gait training, strengthening (add interventions PRN, as appropriate)  Therapy Frequency (PT): 2 times/wk (2-5x/week, PRN, as available)  Outcome Evaluation: Pt seen for therapy evaluation this date, pleasant and cooperative with therapy. Pt may benefit from therapeutic intervention during hospital stay to improve mobility and LE strength for functional mobility and independence.       Time Calculation:    PT Charges       Row Name 01/02/24 1305             Time Calculation    Start Time 1015  -KH      PT Received On 01/02/24  -      PT Goal Re-Cert Due Date 01/16/24  -                User Key  (r) = Recorded By, (t) = Taken By, (c) = Cosigned By      Initials Name Provider Type    Bonnie Dorado, PT Physical Therapist                  Therapy Charges for Today       Code Description Service Date Service Provider Modifiers Qty    84387944834 HC PT EVAL LOW COMPLEXITY 4 1/2/2024 Bonnie Guillen, PT GP 1            PT G-Codes  AM-PAC 6 Clicks Score (PT): 18    Bonnie Guillen PT  1/2/2024

## 2024-01-02 NOTE — PLAN OF CARE
Goal Outcome Evaluation:            No acute events overnight. Urine and sputum sample collected. Patient apenic while sleeping, 2L NC. Will continue to follow plan of care

## 2024-01-02 NOTE — PROGRESS NOTES
[facility] HOSPITALIST PROGRESS NOTE     Patient Identification:  Name:  Mayra Hays  Age:  78 y.o.  Sex:  female  :  1945  MRN:  [id number]  Visit Number:  [id number]  Primary Care Provider:  Niki Antony APRN    Length of stay:  1    Subjective: Patient seen and examined, she is about to ambulate with physical therapy, again she has not been using her oxygen, currently at rest sitting she is no longer tachypneic, heart rate improved, O2 saturation is 90-91% on room air.    Chief Complaint: Short of breath  ----------------------------------------------------------------------------------------------------------------------  Current Hospital Meds:  apixaban, 5 mg, Oral, Q12H  arformoterol, 15 mcg, Nebulization, BID - RT  atorvastatin, 20 mg, Oral, Daily  budesonide, 0.5 mg, Nebulization, BID - RT  doxycycline, 100 mg, Intravenous, Q12H  hydrOXYzine, 25 mg, Oral, Nightly  influenza vaccine, 0.7 mL, Intramuscular, Once  insulin lispro, 2-9 Units, Subcutaneous, 4x Daily AC & at Bedtime  ipratropium, 0.5 mg, Nebulization, 4x Daily - RT  methylPREDNISolone sodium succinate, 40 mg, Intravenous, Q12H  pantoprazole, 40 mg, Oral, Q AM  Pharmacy to Dose LevoFLOXacin (LEVAQUIN), , Does not apply, Once  piperacillin-tazobactam, 3.375 g, Intravenous, Q8H  senna-docusate sodium, 2 tablet, Oral, BID  sodium chloride, 10 mL, Intravenous, Q12H         ----------------------------------------------------------------------------------------------------------------------  Vital Signs:  Temp:  [97.5 °F (36.4 °C)-98.5 °F (36.9 °C)] 97.5 °F (36.4 °C)  Heart Rate:  [] 87  Resp:  [18-20] 20  BP: ()/(50-82) 125/80       Tele: Atrial fibrillation 92 bpm      24  0328 24  0500 24  0500   Weight: 98.3 kg (216 lb 11.2 oz) 98.3 kg (216 lb 11.2 oz) (Admit weight less than 24 hrs.) 99 kg (218 lb 3.2 oz)     Body mass index is 35.22 kg/m².    Intake/Output Summary (Last 24 hours) at  1/2/2024 1028  Last data filed at 1/2/2024 0345  Gross per 24 hour   Intake 700 ml   Output 400 ml   Net 300 ml     Diet: Cardiac Diets; Healthy Heart (2-3 Na+); Texture: Regular Texture (IDDSI 7); Fluid Consistency: Thin (IDDSI 0)  ----------------------------------------------------------------------------------------------------------------------  Physical exam:  General: Sitting at the edge of bed, comfortable,awake, alert, oriented to self, place, and time,  No respiratory distress.   Skin:  Skin is warm and dry. No rash noted. No pallor.    HENT:  Head:  Normocephalic and atraumatic.  Mouth:  Moist mucous membranes.    Eyes:  Conjunctivae and EOM are normal.  Pupils are equal, round, and reactive to light.  No scleral icterus.    Neck:  Neck supple.  No JVD present.  No bruit  Pulmonary/Chest: Right Port-A-Cath no respiratory distress, no wheezes, no crackles, diminished breath sounds both bases   cardiovascular:  Normal rate, irregular rhythm sounds with no murmur.  Abdominal:  Soft.  Bowel sounds are normal.  No distension and no tenderness.   Extremities:  No edema, no tenderness, and no deformity.  No red or swollen joints anywhere.  Strong pulses in all 4 extremities with no clubbing, no cyanosis, no edema.  Neurological:  Motor strength equal no obvious deficit, sensory grossly intact.   No cranial nerve deficit.  No tongue deviation.  No facial droop.  No slurred speech.    Genitourinary: No Shetty catheter  Back:  ----------------------------------------------------------------------------------------------------------------------  ----------------------------------------------------------------------------------------------------------------------  Results from last 7 days   Lab Units 01/01/24  0602 01/01/24 0401 12/31/23  1815   HSTROP T ng/L 20* 20* 21*     Results from last 7 days   Lab Units 01/02/24  0059 01/01/24 0401 12/31/23  2115 12/31/23  1815   CRP mg/dL  --  26.30*  --  32.78*   LACTATE  "mmol/L  --   --  1.4  --    WBC 10*3/mm3 6.70 10.42  --  12.42*   HEMOGLOBIN g/dL 10.8* 10.7*  --  11.6*   HEMATOCRIT % 35.2 35.3  --  36.9   MCV fL 88.2 89.4  --  86.6   MCHC g/dL 30.7* 30.3*  --  31.4*   PLATELETS 10*3/mm3 260 211  --  255     Results from last 7 days   Lab Units 12/31/23  1741   PH, ARTERIAL pH units 7.424   PO2 ART mm Hg 64.2*   PCO2, ARTERIAL mm Hg 34.0*   HCO3 ART mmol/L 22.2     Results from last 7 days   Lab Units 01/02/24  0059 01/01/24  0401 12/31/23  1815 12/26/23  1043   SODIUM mmol/L 140 139 137 140   POTASSIUM mmol/L 4.2 3.7 3.5 3.8   CHLORIDE mmol/L 108* 103 99 102   CO2 mmol/L 21.7* 18.1* 21.4* 27.5   BUN mg/dL 22 21 27* 15   CREATININE mg/dL 1.11* 1.05* 1.50* 0.97   CALCIUM mg/dL 9.5 8.4* 9.3 9.0   GLUCOSE mg/dL 308* 138* 245* 141*   ALBUMIN g/dL  --  3.1* 3.4* 3.9   BILIRUBIN mg/dL  --  0.3 0.4 0.5   ALK PHOS U/L  --  115 157* 133*   AST (SGOT) U/L  --  14 18 11   ALT (SGPT) U/L  --  9 12 9   Estimated Creatinine Clearance: 50.4 mL/min (A) (by C-G formula based on SCr of 1.11 mg/dL (H)).    No results found for: \"AMMONIA\"      Blood Culture   Date Value Ref Range Status   12/31/2023 No growth at 24 hours  Preliminary   12/31/2023 No growth at 24 hours  Preliminary     Urine Culture   Date Value Ref Range Status   12/31/2023 <10,000 CFU/mL Normal Urogenital Yareli  Final     No results found for: \"WOUNDCX\"  No results found for: \"STOOLCX\"    I have personally looked at the labs and they are summarized above.  ----------------------------------------------------------------------------------------------------------------------  Imaging Results (Last 24 Hours)       ** No results found for the last 24 hours. 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  -History of COPD with exacerbation  -Chronic paroxysmal atrial fibrillation now improved with rate control  -Essential hypertension  -acute Albarado HKU1 respiratory infection with concerns for probable concomitant bacterial superinfection   -Former tobacco use   -Noncompliance with oxygen use   -Chronic hypoxic respiratory failure   -Immunocompromise state  -Diabetes type 2 with hyperglycemia probably exacerbated secondary to systemic corticosteroids    Continue Accu-Chek increase correction dosing, patient again counseled importance of using her oxygen, follow-up cultures, continue Zosyn, recheck CRP and Pro-Fabiano, depending on her clinical improvement may de-escalate antibiotic.  Neb treatment.  Follow-up 2D echo.        Disposition pending      Tara Nino MD  01/02/24  10:28 EST

## 2024-01-02 NOTE — CASE MANAGEMENT/SOCIAL WORK
Discharge Planning Assessment   [facility]     Patient Name: Mayra Hays  MRN: [id number]  Today's Date: 1/2/2024    Admit Date: 12/31/2023    Plan: SS received consult per ns for discharge planning.  SS spoke with pt in room.  Pt sitting in bedside chair.  Pt resides at home with family at [address] [place] [address] and plans to return home at discharge.  Pt currently does not utilize home health services.  Pt has home 02 and nebulizer via unknown provider.  Pt's PCP is Niki Antony.  Pt utilizes [facility].  Pt's Daughter Hailey and son Vasu have POA.  Pt transports via private auto per family.  SS will follow and assist with discharge needs.   Discharge Needs Assessment       Row Name 01/02/24 1618       Living Environment    People in Home child(eligio), adult    Name(s) of People in Home Lives at home with jj De Guzman    Current Living Arrangements home    Potentially Unsafe Housing Conditions none    Primary Care Provided by self    Provides Primary Care For no one    Family Caregiver if Needed child(eligio), adult    Quality of Family Relationships helpful;involved;supportive    Able to Return to Prior Arrangements yes       Resource/Environmental Concerns    Resource/Environmental Concerns none    Transportation Concerns none       Transition Planning    Patient/Family Anticipates Transition to home with family    Patient/Family Anticipated Services at Transition     Transportation Anticipated family or friend will provide       Discharge Needs Assessment    Equipment Currently Used at Home oxygen;nebulizer                   Discharge Plan       Row Name 01/02/24 1620       Plan    Plan SS received consult per ns for discharge planning.  SS spoke with pt in room.  Pt sitting in bedside chair.  Pt resides at home with family at [address] [place] [address] and plans to return home at discharge.  Pt currently does not utilize home health services.  Pt has home 02 and nebulizer via unknown provider.  Pt's PCP is  Niki Antony.  Pt utilizes [facility].  Pt's Daughter Hailey and son Vasu have POA.  Pt transports via private auto per family.  SS will follow and assist with discharge needs.                  Continued Care and Services - Admitted Since 12/31/2023    Coordination has not been started for this encounter.       Expected Discharge Date and Time       Expected Discharge Date Expected Discharge Time    Jan 4, 2024            Demographic Summary       Row Name 01/02/24 1617       General Information    Admission Type inpatient    Arrived From home    Referral Source nursing    Reason for Consult discharge planning    Preferred Language English                  Adelita Galvan, KASHW

## 2024-01-03 LAB
ANION GAP SERPL CALCULATED.3IONS-SCNC: 9.6 MMOL/L (ref 5–15)
ANISOCYTOSIS BLD QL: ABNORMAL
BUN SERPL-MCNC: 33 MG/DL (ref 8–23)
BUN/CREAT SERPL: 24.8 (ref 7–25)
CALCIUM SPEC-SCNC: 9.6 MG/DL (ref 8.6–10.5)
CHLORIDE SERPL-SCNC: 106 MMOL/L (ref 98–107)
CO2 SERPL-SCNC: 23.4 MMOL/L (ref 22–29)
CREAT SERPL-MCNC: 1.33 MG/DL (ref 0.57–1)
CRP SERPL-MCNC: 9.9 MG/DL (ref 0–0.5)
DACRYOCYTES BLD QL SMEAR: ABNORMAL
DEPRECATED RDW RBC AUTO: 59.7 FL (ref 37–54)
EGFRCR SERPLBLD CKD-EPI 2021: 41.3 ML/MIN/1.73
ERYTHROCYTE [DISTWIDTH] IN BLOOD BY AUTOMATED COUNT: 17.8 % (ref 12.3–15.4)
GLUCOSE BLDC GLUCOMTR-MCNC: 266 MG/DL (ref 70–130)
GLUCOSE BLDC GLUCOMTR-MCNC: 359 MG/DL (ref 70–130)
GLUCOSE BLDC GLUCOMTR-MCNC: 421 MG/DL (ref 70–130)
GLUCOSE BLDC GLUCOMTR-MCNC: 437 MG/DL (ref 70–130)
GLUCOSE BLDC GLUCOMTR-MCNC: 472 MG/DL (ref 70–130)
GLUCOSE BLDC GLUCOMTR-MCNC: 482 MG/DL (ref 70–130)
GLUCOSE SERPL-MCNC: 291 MG/DL (ref 65–99)
HCT VFR BLD AUTO: 35.1 % (ref 34–46.6)
HGB BLD-MCNC: 10.5 G/DL (ref 12–15.9)
HYPOCHROMIA BLD QL: ABNORMAL
LARGE PLATELETS: ABNORMAL
LYMPHOCYTES # BLD MANUAL: 0.86 10*3/MM3 (ref 0.7–3.1)
LYMPHOCYTES NFR BLD MANUAL: 8 % (ref 5–12)
MAGNESIUM SERPL-MCNC: 2.2 MG/DL (ref 1.6–2.4)
MCH RBC QN AUTO: 27.3 PG (ref 26.6–33)
MCHC RBC AUTO-ENTMCNC: 29.9 G/DL (ref 31.5–35.7)
MCV RBC AUTO: 91.2 FL (ref 79–97)
METAMYELOCYTES NFR BLD MANUAL: 3 % (ref 0–0)
MONOCYTES # BLD: 0.98 10*3/MM3 (ref 0.1–0.9)
NEUTROPHILS # BLD AUTO: 10.09 10*3/MM3 (ref 1.7–7)
NEUTROPHILS NFR BLD MANUAL: 77 % (ref 42.7–76)
NEUTS BAND NFR BLD MANUAL: 5 % (ref 0–5)
OVALOCYTES BLD QL SMEAR: ABNORMAL
PLATELET # BLD AUTO: 291 10*3/MM3 (ref 140–450)
PMV BLD AUTO: 11.2 FL (ref 6–12)
POTASSIUM SERPL-SCNC: 4.6 MMOL/L (ref 3.5–5.2)
PROCALCITONIN SERPL-MCNC: 0.24 NG/ML (ref 0–0.25)
RBC # BLD AUTO: 3.85 10*6/MM3 (ref 3.77–5.28)
SODIUM SERPL-SCNC: 139 MMOL/L (ref 136–145)
VARIANT LYMPHS NFR BLD MANUAL: 7 % (ref 19.6–45.3)
WBC NRBC COR # BLD AUTO: 12.31 10*3/MM3 (ref 3.4–10.8)

## 2024-01-03 PROCEDURE — 94799 UNLISTED PULMONARY SVC/PX: CPT

## 2024-01-03 PROCEDURE — 80048 BASIC METABOLIC PNL TOTAL CA: CPT | Performed by: HOSPITALIST

## 2024-01-03 PROCEDURE — 63710000001 INSULIN LISPRO (HUMAN) PER 5 UNITS: Performed by: HOSPITALIST

## 2024-01-03 PROCEDURE — 94664 DEMO&/EVAL PT USE INHALER: CPT

## 2024-01-03 PROCEDURE — 85007 BL SMEAR W/DIFF WBC COUNT: CPT | Performed by: HOSPITALIST

## 2024-01-03 PROCEDURE — 63710000001 INSULIN LISPRO (HUMAN) PER 5 UNITS

## 2024-01-03 PROCEDURE — 83735 ASSAY OF MAGNESIUM: CPT | Performed by: HOSPITALIST

## 2024-01-03 PROCEDURE — 63710000001 PREDNISONE PER 1 MG: Performed by: HOSPITALIST

## 2024-01-03 PROCEDURE — 86140 C-REACTIVE PROTEIN: CPT | Performed by: HOSPITALIST

## 2024-01-03 PROCEDURE — 25010000002 PIPERACILLIN SOD-TAZOBACTAM PER 1 G: Performed by: INTERNAL MEDICINE

## 2024-01-03 PROCEDURE — 85025 COMPLETE CBC W/AUTO DIFF WBC: CPT | Performed by: HOSPITALIST

## 2024-01-03 PROCEDURE — 63710000001 PREDNISONE PER 5 MG: Performed by: HOSPITALIST

## 2024-01-03 PROCEDURE — 82948 REAGENT STRIP/BLOOD GLUCOSE: CPT

## 2024-01-03 PROCEDURE — 25010000002 CEFTRIAXONE PER 250 MG: Performed by: HOSPITALIST

## 2024-01-03 PROCEDURE — 99232 SBSQ HOSP IP/OBS MODERATE 35: CPT | Performed by: HOSPITALIST

## 2024-01-03 PROCEDURE — 25010000002 METHYLPREDNISOLONE PER 40 MG: Performed by: HOSPITALIST

## 2024-01-03 PROCEDURE — 84145 PROCALCITONIN (PCT): CPT | Performed by: HOSPITALIST

## 2024-01-03 PROCEDURE — 94761 N-INVAS EAR/PLS OXIMETRY MLT: CPT

## 2024-01-03 PROCEDURE — 63710000001 INSULIN GLARGINE PER 5 UNITS: Performed by: HOSPITALIST

## 2024-01-03 RX ORDER — INSULIN LISPRO 100 [IU]/ML
10 INJECTION, SOLUTION INTRAVENOUS; SUBCUTANEOUS ONCE
Qty: 10 UNITS | Refills: 0 | Status: COMPLETED | OUTPATIENT
Start: 2024-01-03 | End: 2024-01-03

## 2024-01-03 RX ADMIN — DOCUSATE SODIUM 50 MG AND SENNOSIDES 8.6 MG 2 TABLET: 8.6; 5 TABLET, FILM COATED ORAL at 20:23

## 2024-01-03 RX ADMIN — BUDESONIDE INHALATION 0.5 MG: 0.5 SUSPENSION RESPIRATORY (INHALATION) at 18:55

## 2024-01-03 RX ADMIN — PIPERACILLIN SODIUM AND TAZOBACTAM SODIUM 3.38 G: 3; .375 INJECTION, POWDER, LYOPHILIZED, FOR SOLUTION INTRAVENOUS at 02:29

## 2024-01-03 RX ADMIN — DOXYCYCLINE 100 MG: 100 INJECTION, POWDER, LYOPHILIZED, FOR SOLUTION INTRAVENOUS at 16:32

## 2024-01-03 RX ADMIN — MUPIROCIN 1 APPLICATION: 20 OINTMENT TOPICAL at 20:23

## 2024-01-03 RX ADMIN — CEFTRIAXONE 1000 MG: 1 INJECTION, POWDER, FOR SOLUTION INTRAMUSCULAR; INTRAVENOUS at 16:32

## 2024-01-03 RX ADMIN — ARFORMOTEROL TARTRATE 15 MCG: 15 SOLUTION RESPIRATORY (INHALATION) at 18:55

## 2024-01-03 RX ADMIN — GUAIFENESIN AND DEXTROMETHORPHAN 5 ML: 100; 10 SYRUP ORAL at 05:04

## 2024-01-03 RX ADMIN — HYDROXYZINE HYDROCHLORIDE 25 MG: 25 TABLET, FILM COATED ORAL at 20:23

## 2024-01-03 RX ADMIN — PANTOPRAZOLE SODIUM 40 MG: 40 TABLET, DELAYED RELEASE ORAL at 05:00

## 2024-01-03 RX ADMIN — MUPIROCIN 1 APPLICATION: 20 OINTMENT TOPICAL at 12:05

## 2024-01-03 RX ADMIN — APIXABAN 5 MG: 5 TABLET, FILM COATED ORAL at 20:23

## 2024-01-03 RX ADMIN — INSULIN GLARGINE 10 UNITS: 100 INJECTION, SOLUTION SUBCUTANEOUS at 16:33

## 2024-01-03 RX ADMIN — IPRATROPIUM BROMIDE 0.5 MG: 0.5 SOLUTION RESPIRATORY (INHALATION) at 18:55

## 2024-01-03 RX ADMIN — BUDESONIDE INHALATION 0.5 MG: 0.5 SUSPENSION RESPIRATORY (INHALATION) at 06:19

## 2024-01-03 RX ADMIN — INSULIN LISPRO 6 UNITS: 100 INJECTION, SOLUTION INTRAVENOUS; SUBCUTANEOUS at 08:07

## 2024-01-03 RX ADMIN — Medication 10 ML: at 20:23

## 2024-01-03 RX ADMIN — ACETAMINOPHEN 650 MG: 325 TABLET ORAL at 05:04

## 2024-01-03 RX ADMIN — INSULIN LISPRO 10 UNITS: 100 INJECTION, SOLUTION INTRAVENOUS; SUBCUTANEOUS at 21:32

## 2024-01-03 RX ADMIN — METHYLPREDNISOLONE SODIUM SUCCINATE 40 MG: 40 INJECTION, POWDER, FOR SOLUTION INTRAMUSCULAR; INTRAVENOUS at 02:29

## 2024-01-03 RX ADMIN — METOPROLOL TARTRATE 12.5 MG: 25 TABLET, FILM COATED ORAL at 20:23

## 2024-01-03 RX ADMIN — HYDROXYZINE HYDROCHLORIDE 25 MG: 25 TABLET, FILM COATED ORAL at 05:04

## 2024-01-03 RX ADMIN — IPRATROPIUM BROMIDE 0.5 MG: 0.5 SOLUTION RESPIRATORY (INHALATION) at 01:09

## 2024-01-03 RX ADMIN — INSULIN LISPRO 8 UNITS: 100 INJECTION, SOLUTION INTRAVENOUS; SUBCUTANEOUS at 12:05

## 2024-01-03 RX ADMIN — DOCUSATE SODIUM 50 MG AND SENNOSIDES 8.6 MG 2 TABLET: 8.6; 5 TABLET, FILM COATED ORAL at 08:07

## 2024-01-03 RX ADMIN — INSULIN LISPRO 9 UNITS: 100 INJECTION, SOLUTION INTRAVENOUS; SUBCUTANEOUS at 20:23

## 2024-01-03 RX ADMIN — IPRATROPIUM BROMIDE 0.5 MG: 0.5 SOLUTION RESPIRATORY (INHALATION) at 12:03

## 2024-01-03 RX ADMIN — ARFORMOTEROL TARTRATE 15 MCG: 15 SOLUTION RESPIRATORY (INHALATION) at 06:19

## 2024-01-03 RX ADMIN — INSULIN LISPRO 9 UNITS: 100 INJECTION, SOLUTION INTRAVENOUS; SUBCUTANEOUS at 16:32

## 2024-01-03 RX ADMIN — IPRATROPIUM BROMIDE 0.5 MG: 0.5 SOLUTION RESPIRATORY (INHALATION) at 06:19

## 2024-01-03 RX ADMIN — METOPROLOL TARTRATE 12.5 MG: 25 TABLET, FILM COATED ORAL at 16:33

## 2024-01-03 RX ADMIN — DOXYCYCLINE 100 MG: 100 INJECTION, POWDER, LYOPHILIZED, FOR SOLUTION INTRAVENOUS at 03:42

## 2024-01-03 RX ADMIN — PREDNISONE 30 MG: 10 TABLET ORAL at 12:05

## 2024-01-03 RX ADMIN — PIPERACILLIN SODIUM AND TAZOBACTAM SODIUM 3.38 G: 3; .375 INJECTION, POWDER, LYOPHILIZED, FOR SOLUTION INTRAVENOUS at 12:05

## 2024-01-03 RX ADMIN — ATORVASTATIN CALCIUM 20 MG: 20 TABLET, FILM COATED ORAL at 08:07

## 2024-01-03 RX ADMIN — Medication 10 ML: at 08:08

## 2024-01-03 RX ADMIN — APIXABAN 5 MG: 5 TABLET, FILM COATED ORAL at 08:07

## 2024-01-03 NOTE — PLAN OF CARE
Goal Outcome Evaluation: Patient has been pleasant. Compliant with care. Patient remains on 2L/NC, saturations remaining above 90%. Patient ambulated in room, steady gait noted. Patient resting in bed. Telemetry in place. Call light in reach.

## 2024-01-03 NOTE — PLAN OF CARE
Goal Outcome Evaluation:  Plan of Care Reviewed With: patient           Outcome Evaluation: Pt has rested in bed overnight. VSS. Pt ambulating to bathroom with assist. No acute changes. Will cotinue POC.

## 2024-01-03 NOTE — PROGRESS NOTES
[facility] HOSPITALIST PROGRESS NOTE     Patient Identification:  Name:  Mayra Hays  Age:  78 y.o.  Sex:  female  :  1945  MRN:  [id number]  Visit Number:  [id number]  Primary Care Provider:  Niki Antony APRN    Length of stay:  2    Subjective: Patient seen and examined, she is no longer dyspneic, awake and alert she reports she is weak, persistently hypoglycemic despite adjusting her dose of sliding scale.  Heart rate is well-controlled A-fib.    Chief Complaint: Short of breath  ----------------------------------------------------------------------------------------------------------------------  Current Hospital Meds:  apixaban, 5 mg, Oral, Q12H  arformoterol, 15 mcg, Nebulization, BID - RT  atorvastatin, 20 mg, Oral, Daily  budesonide, 0.5 mg, Nebulization, BID - RT  doxycycline, 100 mg, Intravenous, Q12H  hydrOXYzine, 25 mg, Oral, Nightly  influenza vaccine, 0.7 mL, Intramuscular, Once  insulin lispro, 2-9 Units, Subcutaneous, 4x Daily AC & at Bedtime  ipratropium, 0.5 mg, Nebulization, 4x Daily - RT  mupirocin, 1 application , Each Nare, BID  pantoprazole, 40 mg, Oral, Q AM  piperacillin-tazobactam, 3.375 g, Intravenous, Q8H  predniSONE, 30 mg, Oral, Daily With Breakfast  senna-docusate sodium, 2 tablet, Oral, BID  sodium chloride, 10 mL, Intravenous, Q12H         ----------------------------------------------------------------------------------------------------------------------  Vital Signs:  Temp:  [97.4 °F (36.3 °C)-97.9 °F (36.6 °C)] 97.8 °F (36.6 °C)  Heart Rate:  [] 79  Resp:  [18-22] 20  BP: (138-146)/(74-92) 143/87       Tele: Atrial fibrillation 84 bpm      24  0500 24  0500 24  0500   Weight: 98.3 kg (216 lb 11.2 oz) (Admit weight less than 24 hrs.) 99 kg (218 lb 3.2 oz) 100 kg (220 lb 11.2 oz)     Body mass index is 35.62 kg/m².    Intake/Output Summary (Last 24 hours) at 1/3/2024 1204  Last data filed at 1/3/2024 0900  Gross per 24 hour    Intake 1400 ml   Output --   Net 1400 ml     Diet: Cardiac Diets; Healthy Heart (2-3 Na+); Texture: Regular Texture (IDDSI 7); Fluid Consistency: Thin (IDDSI 0)  ----------------------------------------------------------------------------------------------------------------------  Physical exam:  General: Comfortable,awake, alert, oriented to self, place, and time, well-developed and well-nourished.  No respiratory distress.    Skin:  Skin is warm and dry. No rash noted. No pallor.    HENT:  Head:  Normocephalic and atraumatic.  Mouth:  Moist mucous membranes.    Eyes:  Conjunctivae and EOM are normal.  Pupils are equal, round, and reactive to light.  No scleral icterus.    Neck:  Neck supple.  No JVD present.  No bruit  Pulmonary/Chest:  No respiratory distress, no wheezes, no crackles, with normal breath sounds and good air movement.  Cardiovascular:  Normal rate, irregular irregular rhythm, no murmur.  Abdominal:  Soft.  Bowel sounds are normal.  No distension and no tenderness.   Extremities:  No edema, no tenderness, and no deformity.  No red or swollen joints anywhere.  Strong pulses in all 4 extremities with no clubbing, no cyanosis, no edema.  Neurological:  Motor strength equal no obvious deficit, sensory grossly intact.   No cranial nerve deficit.  No tongue deviation.  No facial droop.  No slurred speech.    Genitourinary: No Shetty catheter  Back:  ----------------------------------------------------------------------------------------------------------------------  ----------------------------------------------------------------------------------------------------------------------  Results from last 7 days   Lab Units 01/01/24  0602 01/01/24 0401 12/31/23  1815   HSTROP T ng/L 20* 20* 21*     Results from last 7 days   Lab Units 01/03/24  0132 01/02/24  0059 01/01/24  0401 12/31/23  2115 12/31/23  1815   CRP mg/dL 9.90*  --  26.30*  --  32.78*   LACTATE mmol/L  --   --   --  1.4  --    WBC 10*3/mm3  "12.31* 6.70 10.42  --  12.42*   HEMOGLOBIN g/dL 10.5* 10.8* 10.7*  --  11.6*   HEMATOCRIT % 35.1 35.2 35.3  --  36.9   MCV fL 91.2 88.2 89.4  --  86.6   MCHC g/dL 29.9* 30.7* 30.3*  --  31.4*   PLATELETS 10*3/mm3 291 260 211  --  255     Results from last 7 days   Lab Units 12/31/23  1741   PH, ARTERIAL pH units 7.424   PO2 ART mm Hg 64.2*   PCO2, ARTERIAL mm Hg 34.0*   HCO3 ART mmol/L 22.2     Results from last 7 days   Lab Units 01/03/24  0132 01/02/24  0059 01/01/24  0401 12/31/23  1815   SODIUM mmol/L 139 140 139 137   POTASSIUM mmol/L 4.6 4.2 3.7 3.5   MAGNESIUM mg/dL 2.2  --   --   --    CHLORIDE mmol/L 106 108* 103 99   CO2 mmol/L 23.4 21.7* 18.1* 21.4*   BUN mg/dL 33* 22 21 27*   CREATININE mg/dL 1.33* 1.11* 1.05* 1.50*   CALCIUM mg/dL 9.6 9.5 8.4* 9.3   GLUCOSE mg/dL 291* 308* 138* 245*   ALBUMIN g/dL  --   --  3.1* 3.4*   BILIRUBIN mg/dL  --   --  0.3 0.4   ALK PHOS U/L  --   --  115 157*   AST (SGOT) U/L  --   --  14 18   ALT (SGPT) U/L  --   --  9 12   Estimated Creatinine Clearance: 42.3 mL/min (A) (by C-G formula based on SCr of 1.33 mg/dL (H)).    No results found for: \"AMMONIA\"      Blood Culture   Date Value Ref Range Status   12/31/2023 No growth at 2 days  Preliminary   12/31/2023 No growth at 2 days  Preliminary     Urine Culture   Date Value Ref Range Status   12/31/2023 <10,000 CFU/mL Normal Urogenital Yareli  Final     No results found for: \"WOUNDCX\"  No results found for: \"STOOLCX\"    I have personally looked at the labs and they are summarized above.  ----------------------------------------------------------------------------------------------------------------------  Imaging Results (Last 24 Hours)       ** No results found for the last 24 hours. **          ----------------------------------------------------------------------------------------------------------------------  Assessment and Plan:  -Acute on chronic hypoxic respiratory failure  -Acute upper respiratory tract infection Albarado " HKU1  -History of lung cancer on chemotherapy  -Diabetes type II with hyperglycemia likely secondary to Solu-Medrol  -Immunocompromise state  -COPD with exacerbation  -Bilateral basal pneumonia present on admission  -History of paroxysmal atrial fibrillation with RVR now controlled  -Former tobacco use    Patient is stable now with O2 saturation of 96 to 97% on 2 L, heart rate is now controlled, will transition to prednisone oral for 5 days, continue neb treatment, CRP is trending down will DC Zosyn changed to Rocephin and monitor clinically.  Ulcer so far is negative.  Increase sliding scale for Accu-Chek dosing, review of A1c is poorly controlled until this admission likely from Solu-Medrol.    Disposition pending home      Tara Nino MD  01/03/24  12:04 EST

## 2024-01-03 NOTE — CASE MANAGEMENT/SOCIAL WORK
Discharge Planning Assessment  [facility]     Patient Name: Mayra Hays  MRN: [id number]  Today's Date: 1/3/2024    Admit Date: 12/31/2023    Plan: Pt resides at home with family at [address] [place] [address] and plans to return home at discharge.  Pt currently does not utilize home health services.  Pt has home 02 and nebulizer via unknown provider.  Pt's PCP is Niki Antony  Pt utilizes [facility]. Pt transports via private auto per family.  SS will follow and assist with discharge needs.     Discharge Plan       Row Name 01/03/24 1503       Plan    Plan Pt resides at home with family at [address] [place] [address] and plans to return home at discharge.  Pt currently does not utilize home health services.  Pt has home 02 and nebulizer via unknown provider.  Pt's PCP is Niki Antony  Pt utilizes [facility]. Pt transports via private auto per family.  SS will follow and assist with discharge needs.                  Continued Care and Services - Admitted Since 12/31/2023    Coordination has not been started for this encounter.       Expected Discharge Date and Time       Expected Discharge Date Expected Discharge Time    Jan 4, 2024           Adelita Galvan, KASHW

## 2024-01-04 ENCOUNTER — READMISSION MANAGEMENT (OUTPATIENT)
Dept: CALL CENTER | Facility: HOSPITAL | Age: 79
End: 2024-01-04
Payer: MEDICARE

## 2024-01-04 VITALS
HEART RATE: 100 BPM | RESPIRATION RATE: 18 BRPM | DIASTOLIC BLOOD PRESSURE: 91 MMHG | WEIGHT: 220.9 LBS | BODY MASS INDEX: 35.5 KG/M2 | TEMPERATURE: 98.3 F | HEIGHT: 66 IN | SYSTOLIC BLOOD PRESSURE: 142 MMHG | OXYGEN SATURATION: 99 %

## 2024-01-04 LAB
ANION GAP SERPL CALCULATED.3IONS-SCNC: 10.8 MMOL/L (ref 5–15)
ANISOCYTOSIS BLD QL: ABNORMAL
BACTERIA SPEC RESP CULT: NORMAL
BUN SERPL-MCNC: 30 MG/DL (ref 8–23)
BUN/CREAT SERPL: 28.8 (ref 7–25)
CALCIUM SPEC-SCNC: 9.5 MG/DL (ref 8.6–10.5)
CHLORIDE SERPL-SCNC: 103 MMOL/L (ref 98–107)
CO2 SERPL-SCNC: 25.2 MMOL/L (ref 22–29)
CREAT SERPL-MCNC: 1.04 MG/DL (ref 0.57–1)
CRP SERPL-MCNC: 5.09 MG/DL (ref 0–0.5)
DACRYOCYTES BLD QL SMEAR: ABNORMAL
DEPRECATED RDW RBC AUTO: 61.3 FL (ref 37–54)
EGFRCR SERPLBLD CKD-EPI 2021: 55.5 ML/MIN/1.73
ERYTHROCYTE [DISTWIDTH] IN BLOOD BY AUTOMATED COUNT: 18.2 % (ref 12.3–15.4)
GLUCOSE BLDC GLUCOMTR-MCNC: 215 MG/DL (ref 70–130)
GLUCOSE BLDC GLUCOMTR-MCNC: 251 MG/DL (ref 70–130)
GLUCOSE BLDC GLUCOMTR-MCNC: 283 MG/DL (ref 70–130)
GLUCOSE BLDC GLUCOMTR-MCNC: 318 MG/DL (ref 70–130)
GLUCOSE SERPL-MCNC: 296 MG/DL (ref 65–99)
GRAM STN SPEC: NORMAL
HCT VFR BLD AUTO: 36.2 % (ref 34–46.6)
HGB BLD-MCNC: 10.6 G/DL (ref 12–15.9)
HYPOCHROMIA BLD QL: ABNORMAL
LARGE PLATELETS: ABNORMAL
LYMPHOCYTES # BLD MANUAL: 0.84 10*3/MM3 (ref 0.7–3.1)
LYMPHOCYTES NFR BLD MANUAL: 4 % (ref 5–12)
MCH RBC QN AUTO: 27.1 PG (ref 26.6–33)
MCHC RBC AUTO-ENTMCNC: 29.3 G/DL (ref 31.5–35.7)
MCV RBC AUTO: 92.6 FL (ref 79–97)
METAMYELOCYTES NFR BLD MANUAL: 5 % (ref 0–0)
MONOCYTES # BLD: 0.48 10*3/MM3 (ref 0.1–0.9)
NEUTROPHILS # BLD AUTO: 10.13 10*3/MM3 (ref 1.7–7)
NEUTROPHILS NFR BLD MANUAL: 83 % (ref 42.7–76)
NEUTS BAND NFR BLD MANUAL: 1 % (ref 0–5)
OVALOCYTES BLD QL SMEAR: ABNORMAL
PLATELET # BLD AUTO: 299 10*3/MM3 (ref 140–450)
PMV BLD AUTO: 11 FL (ref 6–12)
POTASSIUM SERPL-SCNC: 4.2 MMOL/L (ref 3.5–5.2)
RBC # BLD AUTO: 3.91 10*6/MM3 (ref 3.77–5.28)
SCAN SLIDE: NORMAL
SODIUM SERPL-SCNC: 139 MMOL/L (ref 136–145)
VARIANT LYMPHS NFR BLD MANUAL: 7 % (ref 19.6–45.3)
WBC NRBC COR # BLD AUTO: 12.06 10*3/MM3 (ref 3.4–10.8)

## 2024-01-04 PROCEDURE — 94664 DEMO&/EVAL PT USE INHALER: CPT

## 2024-01-04 PROCEDURE — 86140 C-REACTIVE PROTEIN: CPT | Performed by: HOSPITALIST

## 2024-01-04 PROCEDURE — 94799 UNLISTED PULMONARY SVC/PX: CPT

## 2024-01-04 PROCEDURE — 99239 HOSP IP/OBS DSCHRG MGMT >30: CPT | Performed by: HOSPITALIST

## 2024-01-04 PROCEDURE — 63710000001 INSULIN GLARGINE PER 5 UNITS: Performed by: HOSPITALIST

## 2024-01-04 PROCEDURE — 82948 REAGENT STRIP/BLOOD GLUCOSE: CPT

## 2024-01-04 PROCEDURE — 63710000001 PREDNISONE PER 5 MG: Performed by: HOSPITALIST

## 2024-01-04 PROCEDURE — 85025 COMPLETE CBC W/AUTO DIFF WBC: CPT | Performed by: HOSPITALIST

## 2024-01-04 PROCEDURE — 25010000002 CEFTRIAXONE PER 250 MG: Performed by: HOSPITALIST

## 2024-01-04 PROCEDURE — 94761 N-INVAS EAR/PLS OXIMETRY MLT: CPT

## 2024-01-04 PROCEDURE — 85007 BL SMEAR W/DIFF WBC COUNT: CPT | Performed by: HOSPITALIST

## 2024-01-04 PROCEDURE — 80048 BASIC METABOLIC PNL TOTAL CA: CPT | Performed by: HOSPITALIST

## 2024-01-04 PROCEDURE — 63710000001 PREDNISONE PER 1 MG: Performed by: HOSPITALIST

## 2024-01-04 PROCEDURE — 63710000001 INSULIN LISPRO (HUMAN) PER 5 UNITS: Performed by: HOSPITALIST

## 2024-01-04 RX ORDER — HEPARIN SODIUM (PORCINE) LOCK FLUSH IV SOLN 100 UNIT/ML 100 UNIT/ML
500 SOLUTION INTRAVENOUS ONCE
Status: DISCONTINUED | OUTPATIENT
Start: 2024-01-04 | End: 2024-01-04 | Stop reason: HOSPADM

## 2024-01-04 RX ORDER — PREDNISONE 10 MG/1
30 TABLET ORAL
Qty: 9 TABLET | Refills: 0 | Status: SHIPPED | OUTPATIENT
Start: 2024-01-05 | End: 2024-01-08

## 2024-01-04 RX ORDER — DOXYCYCLINE HYCLATE 100 MG/1
100 CAPSULE ORAL 2 TIMES DAILY
Qty: 6 CAPSULE | Refills: 0 | Status: SHIPPED | OUTPATIENT
Start: 2024-01-04 | End: 2024-01-07

## 2024-01-04 RX ORDER — CEFDINIR 300 MG/1
300 CAPSULE ORAL 2 TIMES DAILY
Qty: 8 CAPSULE | Refills: 0 | Status: SHIPPED | OUTPATIENT
Start: 2024-01-04 | End: 2024-01-08

## 2024-01-04 RX ADMIN — METOPROLOL TARTRATE 12.5 MG: 25 TABLET, FILM COATED ORAL at 08:53

## 2024-01-04 RX ADMIN — CEFTRIAXONE 1000 MG: 1 INJECTION, POWDER, FOR SOLUTION INTRAMUSCULAR; INTRAVENOUS at 14:09

## 2024-01-04 RX ADMIN — DOXYCYCLINE 100 MG: 100 INJECTION, POWDER, LYOPHILIZED, FOR SOLUTION INTRAVENOUS at 04:38

## 2024-01-04 RX ADMIN — MUPIROCIN 1 APPLICATION: 20 OINTMENT TOPICAL at 11:11

## 2024-01-04 RX ADMIN — INSULIN LISPRO 6 UNITS: 100 INJECTION, SOLUTION INTRAVENOUS; SUBCUTANEOUS at 16:56

## 2024-01-04 RX ADMIN — APIXABAN 5 MG: 5 TABLET, FILM COATED ORAL at 08:53

## 2024-01-04 RX ADMIN — INSULIN GLARGINE 15 UNITS: 100 INJECTION, SOLUTION SUBCUTANEOUS at 08:53

## 2024-01-04 RX ADMIN — INSULIN LISPRO 4 UNITS: 100 INJECTION, SOLUTION INTRAVENOUS; SUBCUTANEOUS at 08:53

## 2024-01-04 RX ADMIN — PANTOPRAZOLE SODIUM 40 MG: 40 TABLET, DELAYED RELEASE ORAL at 06:30

## 2024-01-04 RX ADMIN — INSULIN LISPRO 6 UNITS: 100 INJECTION, SOLUTION INTRAVENOUS; SUBCUTANEOUS at 11:10

## 2024-01-04 RX ADMIN — ARFORMOTEROL TARTRATE 15 MCG: 15 SOLUTION RESPIRATORY (INHALATION) at 07:29

## 2024-01-04 RX ADMIN — Medication 10 ML: at 08:54

## 2024-01-04 RX ADMIN — IPRATROPIUM BROMIDE 0.5 MG: 0.5 SOLUTION RESPIRATORY (INHALATION) at 02:24

## 2024-01-04 RX ADMIN — PREDNISONE 30 MG: 10 TABLET ORAL at 08:53

## 2024-01-04 RX ADMIN — IPRATROPIUM BROMIDE 0.5 MG: 0.5 SOLUTION RESPIRATORY (INHALATION) at 07:29

## 2024-01-04 RX ADMIN — ATORVASTATIN CALCIUM 20 MG: 20 TABLET, FILM COATED ORAL at 11:08

## 2024-01-04 RX ADMIN — DOCUSATE SODIUM 50 MG AND SENNOSIDES 8.6 MG 2 TABLET: 8.6; 5 TABLET, FILM COATED ORAL at 08:52

## 2024-01-04 RX ADMIN — IPRATROPIUM BROMIDE 0.5 MG: 0.5 SOLUTION RESPIRATORY (INHALATION) at 13:05

## 2024-01-04 RX ADMIN — BUDESONIDE INHALATION 0.5 MG: 0.5 SUSPENSION RESPIRATORY (INHALATION) at 07:30

## 2024-01-04 NOTE — PLAN OF CARE
Goal Outcome Evaluation:              Outcome Evaluation: Pt resting in bed at this time. No s/s of distress noted. 2L NC. Pt has ambulated to bathroom with assistance. No complaints of pain or discomfort. Will continue POC.

## 2024-01-04 NOTE — DISCHARGE PLACEMENT REQUEST
"Charles Archer (78 y.o. Female)       Date of Birth   1945    Social Security Number       Address   [address] [address]    Home Phone   [phone]    MRN   [id number]       [employer]   None    Marital Status   Single                            Admission Date   12/31/23    Admission Type   Emergency    Admitting Provider   Izabel Interiano DO    Attending Provider   Tara Nino MD    Department, Room/Bed   [address], 3338/1P       Discharge Date       Discharge Disposition   Home-Health Care [facility]    Discharge Destination                                 Attending Provider: Tara Nino MD    Allergies: Paclitaxel    Isolation: Contact   Infection: Bed Bugs (10/13/23)   Code Status: CPR    Ht: 167.6 cm (66\")   Wt: 100 kg (220 lb 14.4 oz)    Admission Cmt: None   Principal Problem: None                  Active Insurance as of 12/31/2023       Primary Coverage       Payor Plan Insurance Group Employer/Plan Group    ANTHEM MEDICARE REPLACEMENT ANTHEM MEDICARE ADVANTAGE [id number]       Payor Plan Address Payor Plan Phone Number Payor Plan Fax Number Effective Dates    [address] [phone]  2/1/2023 - None Entered    [facility] [address]         Subscriber Name Subscriber Birth Date Member ID       CHARLES ARCHER 1945 [id number]               Secondary Coverage       Payor Plan Insurance Group Employer/Plan Group    [place]        Payor Plan Address Payor Plan Phone Number Payor Plan Fax Number Effective Dates    [address]   1/1/2014 - None Entered    [id number] [id number]         Subscriber Name Subscriber Birth Date Member ID       CHARLES ARCHER 1945 [id number]                     Emergency Contacts        (Rel.) Home Phone Work Phone Mobile Phone    Hailey Quinonez (POA) (Power of ) [phone] -- --    PHIL PRASAD (alternative POA) (Power of ) -- -- " [phone]    CHELO PRASAD (Daughter) [phone] -- --          [address] [address]  Phone:  [phone]  Fax:  [phone] Date: 2024      Ambulatory Referral to Home Health     Patient:  Mayra Hays MRN:  [id number]   [address]    [facility] [address] :  1945  SSN:    Phone: [phone] Sex:  F      INSURANCE PAYOR PLAN GROUP # SUBSCRIBER ID   Primary:  Secondary:    ANTHEM MEDICARE REPLACEMENT  AETNA [facility] [id number]  [id number]    [id number]  [id number]      Referring Provider Information:  HARRIETT GUERRIER Phone: [phone] Fax: [phone]       Referral Information:   # Visits:  999 Referral Type: Home Health [42]   Urgency:  Routine Referral Reason: Specialty Services Required   Start Date: 2024 End Date:  To be determined by Insurer   Diagnosis: Chronic obstructive pulmonary disease, unspecified COPD type (J44.9 [ICD-10-CM] 496 [ICD-9-CM])  Chronic respiratory failure with hypoxia (J96.11 [ICD-10-CM] 518.83,799.02 [ICD-9-CM])  Type 2 diabetes mellitus without complication, without long-term current use of insulin (E11.9 [ICD-10-CM] 250.00 [ICD-9-CM])      Refer to Dept:   Refer to Provider:   Refer to Provider Phone:   Refer to Facility:       Face to Face Visit Date: 2024  Follow-up provider for Plan of Care? I treated the patient in an acute care facility and will not continue treatment after discharge.  Follow-up provider: МАРИЯ BURRIS [[id number]]  Reason/Clinical Findings: Diabetes with hyperglycemia, acute hypoxic respiratory failure, pneumonia, COPD exacerbation, medication supervision COPD management  Describe mobility limitations that make leaving home difficult: Patient has COPD and respiratory failure requires company to leave the house  Nursing/Therapeutic Services Requested: Physical Therapy  Nursing/Therapeutic Services Requested: Skilled Nursing  Nursing/Therapeutic  Services Requested: Occupational Therapy  Skilled nursing orders: COPD management (Diabetes)  Skilled nursing orders: Medication education  PT orders: Gait Training  PT orders: Strengthening  Weight Bearing Status: As Tolerated  Occupational orders: Activities of daily living  Occupational orders: Energy conservation  Occupational orders: Fine motor  Occupational orders: Home safety assessment  Frequency: 1 Week 1     This document serves as a request of services and does not constitute Insurance authorization or approval of services.  To determine eligibility, please contact the members Insurance carrier to verify and review coverage.     If you have medical questions regarding this request for services. Please contact [address] at [phone] during normal business hours.        Authorizing Provider:Tara Nino MD  Authorizing Provider's NPI: [id number]  Order Entered By: Tara Nino MD 1/4/2024 10:26 AM     Electronically signed by: Tara Nino MD 1/4/2024 10:26 AM            History & Physical        Fernanda, Kae Blackburn PA-C at 01/01/24 0139       Attestation signed by Izabel Interiano DO at 01/01/24 0613    I have reviewed this documentation and agree.  Patient also briefly seen and examined at bedside in 319.  Patient is sleeping comfortably but is easily awakened.  The patient states that overall she is feeling better compared to admission and she is hopeful to be discharged home soon.  She currently denies any shortness of air and/or cough.  She stated that she felt nauseated prior to her arrival to the ED but currently denies any nausea.    Pertinent physical exam findings reveal scattered bilateral rhonchi with left lower lobe rales and occasional expiratory wheezing.  Heart was irregular rate and irregular rhythm without obvious murmur. No significant lower extremity edema; AxO x3.      Continue empiric antibiotic therapy with Zosyn and doxycycline  for now for treatment of suspected postobstructive versus community-acquired pneumonia.  Continue supportive care for coronavirus H KU 1 infection.  Will add p.o. prednisone given active wheezing on exam.  Will also go ahead and discontinue her IV fluids as DONNIE has essentially resolved and creatinine is back at/near baseline.  Agree with cardiology consultation as I am unable to view other EKGs with evidence of a left bundle branch block, however, per my view of patient's EKG at present she appears to have an incomplete left bundle branch block.                     [facility] Medicine Services  HISTORY & PHYSICAL    Patient Identification:  Name:  Mayra Hays  Age:  78 y.o.  Sex:  female  :  1945  MRN:  [id number]   Visit Number:  [id number]  Admit Date: 2023   Primary Care Physician:  Niki Antony APRN     Subjective     Chief complaint:   Chief Complaint   Patient presents with    Dysuria    Shortness of Breath     History of presenting illness:   Patient is a 78 y.o. female with past medical history significant for chronic diastolic CHF, non-small cell cancer of the right lung, paroxysmal atrial fibrillation, chronic anticoagulation with Eliquis, hyperlipidemia, essential hypertension, COPD, type 2 diabetes mellitus, GERD, that presented to the [facility] emergency department for evaluation of dysuria and dyspnea.    The patient reports over the past 3 to 4 days she has been feeling ill with dyspnea upon exertion, and productive cough with yellow/green sputum and dysuria.  She called her PCP on Friday () who called her in a prescription for a UTI.  The patient does not believe this medication was sent and and therefore has not been taking it.  She denies any fever, chills, wheezing, chest pain, leg edema, abdominal pain, nausea, vomiting, diarrhea, urinary frequency, malodorous urine.  She has been taking her medications as prescribed despite having a  decreased appetite.  She has been using her albuterol nebulizer treatments at home around 2 times a day.  She denies any chronic oxygen use.  She is currently alert and oriented to herself, birthday, month, year, and daughter-in-law at bedside.    Upon arrival to the ED, vitals were temperature 98.8 °F, pulse 114, respirations 17, /61, SpO2 89% on room air.    In the emergency department the patient received IV Rocephin 1 g, IV levofloxacin 750 mg,  mL bolus, IV vancomycin    Patient has been admitted to the medical telemetry floor for further evaluation and treatment.  Patient seen and evaluated in the emergency department room 109.  Patient's daughter-in-law present at bedside during exam.  ---------------------------------------------------------------------------------------------------------------------   Review of Systems   Constitutional:  Positive for appetite change and fatigue. Negative for chills, diaphoresis and fever.   HENT:  Negative for rhinorrhea and sore throat.    Respiratory:  Positive for cough (Productive with yellow/green sputum) and shortness of breath (With exertion). Negative for wheezing.    Cardiovascular:  Negative for chest pain, palpitations and leg swelling.   Gastrointestinal:  Negative for abdominal pain, constipation, diarrhea and vomiting.   Genitourinary:  Positive for dysuria. Negative for frequency.   Neurological:  Negative for dizziness and light-headedness.   Psychiatric/Behavioral:  Negative for confusion.       ---------------------------------------------------------------------------------------------------------------------   Past Medical History:   Diagnosis Date    Arthritis     CHF (congestive heart failure)     Chronic anticoagulation     Eliquis    Chronic kidney disease     stage II/IIIa    Collapsed lung     COPD (chronic obstructive pulmonary disease)     Diabetes mellitus     TYPE 2    Elevated cholesterol     GERD (gastroesophageal reflux disease)      Hyperlipidemia     Hypertension     Non-small cell lung cancer RUL     Sleep apnea     non compliant with CPAP    Stroke 2019    Wears eyeglasses      Past Surgical History:   Procedure Laterality Date    BRONCHOSCOPY Bilateral 2023    Procedure: BRONCHOSCOPY WITH ENDOBRONCHIAL ULTRASOUND;  Surgeon: Abdulkadir Peter MD;  Location:  [facility];  Service: Pulmonary;  Laterality: Bilateral;    BRONCHOSCOPY WITH ION ROBOTIC ASSIST N/A 2023    Procedure: BRONCHOSCOPY WITH ION ROBOT AND EBUS;  Surgeon: John Clemens MD;  Location:  [facility];  Service: Robotics - Pulmonary;  Laterality: N/A;  Ion cath #6 - 0032,  #6 - 0030, cath guide # 0077. EBUS scope removed with balloon intact.    CARDIAC CATHETERIZATION N/A 2017    Procedure: Left Heart Cath;  Surgeon: Jatinder Matias MD;  Location:  [facility];  Service:     CARDIAC CATHETERIZATION N/A 2021    Procedure: Left Heart Cath;  Surgeon: Tolu Steinberg MD;  [facility]  [facility];  Service: Cardiology;  Laterality: N/A;    COLONOSCOPY      ENDOSCOPY      GALLBLADDER SURGERY      PORTACATH PLACEMENT N/A 2023    Procedure: INSERTION OF PORTACATH;  Surgeon: Petr Velásquez MD;  Location:  [facility];  Service: General;  Laterality: N/A;    VENTRAL HERNIA REPAIR N/A 2020    Procedure: VENTRAL HERNIA REPAIR LAPAROSCOPIC WITH DAVINCI ROBOT;  Surgeon: Petr Velásquez MD;  Location:  [facility];  Service: DaVinci;  Laterality: N/A;     Family History   Problem Relation Age of Onset    Heart disease Mother         Rhianna sophia    Heart disease Father     Heart attack Father     Heart failure Father      Social History     Socioeconomic History    Marital status: Single    Number of children: 6   Tobacco Use    Smoking status: Former     Packs/day: 3     Types: Cigarettes     Start date: 4/3/1998     Quit date: 2015     Years since quittin.0     Passive exposure: Past    Smokeless  tobacco: Never   Vaping Use    Vaping Use: Never used   Substance and Sexual Activity    Alcohol use: No    Drug use: No    Sexual activity: Never     ---------------------------------------------------------------------------------------------------------------------   Allergies:  Paclitaxel  ---------------------------------------------------------------------------------------------------------------------   Medications below are reported home medications pulling from within the system; at this time, these medications have not been reconciled unless otherwise specified and are in the verification process for further verifcation as current home medications.    Prior to Admission Medications       Prescriptions Last Dose Informant Patient Reported? Taking?    acetaminophen (TYLENOL) 325 MG tablet   No No    Take 2 tablets by mouth Every 4 (Four) Hours As Needed for Mild Pain.    albuterol sulfate  (90 Base) MCG/ACT inhaler  Self No No    Inhale 2 puffs Every 4 (Four) Hours As Needed for Wheezing or Shortness of Air.    apixaban (ELIQUIS) 5 MG tablet tablet   No No    Take 1 tablet by mouth Every 12 (Twelve) Hours.    atorvastatin (LIPITOR) 20 MG tablet   No No    Take 1 tablet by mouth Daily.    Budeson-Glycopyrrol-Formoterol (Breztri Aerosphere) 160-9-4.8 MCG/ACT aerosol inhaler  Self No No    Inhale 2 puffs 2 (Two) Times a Day.    esomeprazole (nexIUM) 20 MG capsule  Self Yes No    Take 1 capsule by mouth Daily.    ibuprofen (ADVIL,MOTRIN) 600 MG tablet   No No    Take 1 tablet by mouth Every 6 (Six) Hours As Needed for Mild Pain.    ipratropium-albuterol (DUO-NEB) 0.5-2.5 mg/3 ml nebulizer  Self No No    USE 1 VIAL IN NEBULIZER 4 TIMES DAILY - and as needed    lidocaine-prilocaine (EMLA) 2.5-2.5 % cream   No No    Apply to port-a-cath site 30 minutes prior to arrival at infusion center. Cover with plastic wrap.    lisinopril (PRINIVIL,ZESTRIL) 5 MG tablet   No No    Take 1 tablet by mouth Daily.     loratadine (CLARITIN) 10 MG tablet  Self Yes No    Take 1 tablet by mouth Daily As Needed for Allergies.    methocarbamol (Robaxin) 500 MG tablet  Self No No    Take 1 tablet by mouth 3 (Three) Times a Day As Needed for Muscle Spasms.    O2 (OXYGEN)  Self Yes No    Inhale 2 L/min As Needed.    ondansetron (Zofran) 8 MG tablet   No No    Take 1 tablet by mouth Every 8 (Eight) Hours As Needed for Nausea or Vomiting.    pantoprazole (PROTONIX) 40 MG EC tablet  Self Yes No    Take 1 tablet by mouth Daily As Needed (acid reflux).    potassium chloride (K-DUR,KLOR-CON) 20 MEQ CR tablet  Self Yes No    Take 1 tablet by mouth Daily.    prochlorperazine (COMPAZINE) 10 MG tablet   No No    Take 1 tablet by mouth Every 6 (Six) Hours As Needed for Nausea or Vomiting.    Stool Softener 100 MG capsule   Yes No    Take 1 capsule by mouth 2 (two) times a day.    trimethoprim (TRIMPEX) 100 MG tablet  Self No No    Take 1 tablet by mouth 2 (Two) Times a Day.          ---------------------------------------------------------------------------------------------------------------------    Objective     Hospital Scheduled Meds:  doxycycline, 100 mg, Intravenous, Q12H  heparin (porcine), 5,000 Units, Subcutaneous, Q12H  insulin lispro, 2-7 Units, Subcutaneous, 4x Daily AC & at Bedtime  Pharmacy to Dose LevoFLOXacin (LEVAQUIN), , Does not apply, Once  piperacillin-tazobactam, 3.375 g, Intravenous, Once  piperacillin-tazobactam, 3.375 g, Intravenous, Q8H  senna-docusate sodium, 2 tablet, Oral, BID  sodium chloride, 10 mL, Intravenous, Q12H           Current listed hospital scheduled medications may not yet reflect those currently placed in orders that are signed and held, awaiting patient's arrival to floor/unit.    ---------------------------------------------------------------------------------------------------------------------   Vital Signs:  Temp:  [97.6 °F (36.4 °C)-98.8 °F (37.1 °C)] 97.6 °F (36.4 °C)  Heart Rate:  []  106  Resp:  [17-22] 22  BP: (112-136)/(60-85) 127/69  Mean Arterial Pressure (Non-Invasive) for the past 24 hrs (Last 3 readings):   Noninvasive MAP (mmHg)   01/01/24 0328 89   12/31/23 2220 101   12/31/23 2200 88     SpO2 Percentage    12/31/23 2200 12/31/23 2220 01/01/24 0328   SpO2: 95% 97% 97%     SpO2:  [89 %-97 %] 97 %  on   ;   Device (Oxygen Therapy): room air    Body mass index is 34.98 kg/m².  Wt Readings from Last 3 Encounters:   01/01/24 98.3 kg (216 lb 11.2 oz)   12/26/23 99.6 kg (219 lb 9.6 oz)   12/21/23 102 kg (224 lb 3.2 oz)     ---------------------------------------------------------------------------------------------------------------------   Physical Exam:  Physical Exam  Vitals and nursing note reviewed.   Constitutional:       General: She is not in acute distress.     Appearance: She is well-developed. She is not diaphoretic.   HENT:      Head: Normocephalic and atraumatic.      Right Ear: External ear normal.      Left Ear: External ear normal.      Nose: Nose normal.   Eyes:      Conjunctiva/sclera: Conjunctivae normal.      Pupils: Pupils are equal, round, and reactive to light.   Neck:      Vascular: No JVD.      Trachea: No tracheal deviation.   Cardiovascular:      Rate and Rhythm: Normal rate and regular rhythm.      Heart sounds: Normal heart sounds. No murmur heard.  Pulmonary:      Effort: Pulmonary effort is normal. No respiratory distress.      Breath sounds: Examination of the right-upper field reveals rhonchi. Examination of the left-upper field reveals rhonchi. Examination of the left-lower field reveals rales. Rhonchi and rales present.   Abdominal:      General: Bowel sounds are normal.      Palpations: Abdomen is soft.      Tenderness: There is no abdominal tenderness.   Musculoskeletal:         General: No deformity. Normal range of motion.      Cervical back: Normal range of motion and neck supple.   Skin:     General: Skin is warm and dry.      Coloration: Skin is not  pale.      Findings: No erythema or rash.   Neurological:      General: No focal deficit present.      Mental Status: She is alert and oriented to person, place, and time. Mental status is at baseline.      Cranial Nerves: No cranial nerve deficit.   Psychiatric:         Behavior: Behavior normal.         Thought Content: Thought content normal.       ---------------------------------------------------------------------------------------------------------------------  EKG:    Pending cardiology read.  Per my evaluation, atrial fibrillation with RVR, left bundle branch block, heart rate 121, QTc 477 MS.  When compared to EKG from 10/9/2023, atrial fibrillation has now replaced normal sinus rhythm.  There also appears to be a new left bundle branch block that was not present when compared to prior EKGs from 8/2023, 9/2023, and 10/2023    Telemetry:    Atrial fibrillation with RVR, heart rate 103, SpO2 95% on room air    I have personally reviewed the EKG/Telemetry strip  ---------------------------------------------------------------------------------------------------------------------   Results from last 7 days   Lab Units 12/31/23  1815   HSTROP T ng/L 21*     Results from last 7 days   Lab Units 12/31/23  1815   PROBNP pg/mL 2,015.0*       Results from last 7 days   Lab Units 12/31/23  1741   PH, ARTERIAL pH units 7.424   PO2 ART mm Hg 64.2*   PCO2, ARTERIAL mm Hg 34.0*   HCO3 ART mmol/L 22.2     Results from last 7 days   Lab Units 12/31/23 2115 12/31/23 1815 12/26/23  1043   CRP mg/dL  --  32.78*  --    LACTATE mmol/L 1.4  --   --    WBC 10*3/mm3  --  12.42* 8.94   HEMOGLOBIN g/dL  --  11.6* 11.9*   HEMATOCRIT %  --  36.9 37.8   MCV fL  --  86.6 86.5   MCHC g/dL  --  31.4* 31.5   PLATELETS 10*3/mm3  --  255 168     Results from last 7 days   Lab Units 12/31/23 1815 12/26/23  1043   SODIUM mmol/L 137 140   POTASSIUM mmol/L 3.5 3.8   CHLORIDE mmol/L 99 102   CO2 mmol/L 21.4* 27.5   BUN mg/dL 27* 15   CREATININE  mg/dL 1.50* 0.97   CALCIUM mg/dL 9.3 9.0   GLUCOSE mg/dL 245* 141*   ALBUMIN g/dL 3.4* 3.9   BILIRUBIN mg/dL 0.4 0.5   ALK PHOS U/L 157* 133*   AST (SGOT) U/L 18 11   ALT (SGPT) U/L 12 9   Estimated Creatinine Clearance: 37.1 mL/min (A) (by C-G formula based on SCr of 1.5 mg/dL (H)).    Lab Results   Component Value Date    HGBA1C 6.60 (H) 05/12/2020     Lab Results   Component Value Date    TSH 2.300 10/09/2023    FREET4 1.23 10/09/2023       Pain Management Panel          Latest Ref Rng & Units 5/30/2017   Pain Management Panel   Amphetamine, Urine Qual Negative Negative    Barbiturates Screen, Urine Negative Negative    Benzodiazepine Screen, Urine Negative Negative    Buprenorphine, Screen, Urine Negative Negative    Cocaine Screen, Urine Negative Negative    Methadone Screen , Urine Negative Negative      I have personally reviewed the above laboratory results.   ---------------------------------------------------------------------------------------------------------------------  Imaging Results (Last 7 Days)       Procedure Component Value Units Date/Time    CT Head Without Contrast [[id number]] Collected: 01/01/24 0224     Updated: 01/01/24 0229    Narrative:      EXAMINATION: CT head without contrast     CLINICAL HISTORY: Mental status change. Unknown cause.     COMPARISON: 10/9/2023     TECHNIQUE: Contiguous 3 mm thick slices were obtained from the skull  base to the vertex without contrast. Coronal and sagittal reformats were  performed.     FINDINGS:  Brain volume is normal for age. There is no acute intracranial  hemorrhage. No acute territorial infarct. No extra-axial collection. No  shift of midline structures. No acute calvarial fracture. The visualized  paranasal sinuses and the mastoid air cells are largely clear. Mildly  displaced nasal bone fractures appear similar to the study 10/9/2023.       Impression:      1. No acute intracranial process. If symptoms persist, MRI should be  considered for  further evaluation.     This report was finalized on 1/1/2024 2:27 AM by Rajat Avila MD.       CT Chest Without Contrast Diagnostic [[id number]] Collected: 12/31/23 2035     Updated: 12/31/23 2041    Narrative:      INDICATION: Cough.     COMPARISON: CT chest from 12/19/2023     TECHNIQUE: Axial CT images of the chest were obtained without IV  contrast administration. Coronal and sagittal reformations were  reviewed.     FINDINGS:  The thoracic aorta appears normal in caliber. The heart is mildly  enlarged. No pericardial or pleural effusion. No pneumothorax. Enlarged  mediastinal lymph nodes measuring up to 1.5 cm.     Spiculated nodule in the right upper lobe measuring 1.9 cm.  Atelectasis/airspace disease in the lingula and left lower lobe.  Masslike consolidation in the right lower lobe, difficult evaluation  secondary to motion artifact and lack of IV contrast. Chest port noted.     Degenerative changes in the spine. No acute osseous abnormality evident.     Visualized upper abdominal structures appear unremarkable.       Impression:      Spiculated nodule of the right upper lobe concerning for neoplasm.  Masslike consolidation in the right lower lobe could represent pneumonia  with underlying mass not excluded. Atelectasis/airspace disease in the  left lower lobe and lingula. Mediastinal adenopathy. Follow-up  recommended.        This report was finalized on 12/31/2023 8:39 PM by Alex Pallas, DO.       XR Chest 1 View [[id number]] Collected: 12/31/23 1941     Updated: 12/31/23 1944    Narrative:      INDICATION: Shortness of breath.     TECHNIQUE: Frontal radiograph of the chest.     COMPARISON: 10/9/2023.     FINDINGS:   Cardiomegaly. Small bilateral pleural effusions with underlying  atelectasis/airspace disease. Chronic appearing interstitial lung  markings. No pneumothorax. Chest port catheter tip in the previously  healed. No acute fracture.       Impression:      Small bilateral pleural effusions with  underlying atelectasis/airspace  disease.        This report was finalized on 12/31/2023 7:42 PM by Alex Pallas, DO.             I have personally reviewed the above radiology results.     Last Echocardiogram:  Results for orders placed during the hospital encounter of 06/07/21    Adult Transthoracic Echo Complete W/ Cont if Necessary Per Protocol    Interpretation Summary  · The study is technically difficult for diagnosis.  · Normal left ventricular cavity size and wall thickness noted. All left ventricular wall segments contract normally  · Left ventricular ejection fraction appears to be 61 - 65%.  · Left ventricular diastolic function is consistent with (grade I) impaired relaxation.  · The aortic valve is structurally normal with no regurgitation or stenosis present.  · The mitral valve is structurally normal with no regurgitation or significant stenosis present.  · There is no evidence of pericardial effusion.    ---------------------------------------------------------------------------------------------------------------------    Assessment & Plan      Active Hospital Problems    Diagnosis  POA    Sepsis due to pneumonia [J18.9, A41.9]  Yes       #Sepsis POA secondary to right lower lobe pneumonia, concerning for postobstructive pneumonia (HR> 90, CRP> 2, WBC> 12)  #Acute infection with coronavirus HKU1  #Known history of non-small cell lung cancer of the right upper lobe, on chemoradiation  #Mediastinal adenopathy  #Immunocompromised  #Elevated anion gap  #?  Acute UTI  -CT of the chest without contrast shows a spiculated nodule in the right upper lobe and a masslike consolidation in the right lower lobe likely representing pneumonia along with atelectasis/airspace disease in the left lower lobe and lingula; mediastinal adenopathy  -UA is also grossly abnormal with trace ketones, trace leukocytes, 3-5 WBC, 1+ bacteria, however, dirty sample with 3-12 squamous epithelial cells  -Patient received IV  Rocephin, levofloxacin, and vancomycin in the emergency department; we will change antibiotic therapy to pharmacy dose Zosyn and doxycycline  -Obtain MRSA swab, respiratory culture, urine Legionella, and strep pneumo; respiratory panel positive for coronavirus HKU1   -Urine culture and blood cultures pending  -Anion gap is elevated at 1.5; likely secondary to DONNIE; ABG without systemic acidosis  -Gentle maintenance fluids ordered; repeat a.m. CMP and monitor for anion gap closure  -Repeat a.m. CBC and CRP  -Atrovent treatments ordered  -Incentive spirometer given, encourage use  -Monitor vitals closely    #New left bundle branch block  #NSTEMI, type I versus type II  #Essential hypertension  #Hyperlipidemia  -EKG obtained in the ER shows what appears to be a new LBBB; reviewed EKG with Dr. Interiano; LBBB not present on EKGs from 9/5/23 or 10/9/23  -Repeat EKG  - HS Trop T 21; appears to be at baseline when compared to labs from 10/23  -Suspect type II NSTEMI in setting of DONNIE and demand ischemia from a.fib with RVR   -Pt denies chest pain  -Cardiology has been consulted, further input/assistance is much appreciated     #DONNIE on CKD stage IIIa  -Baseline Cr appears to be around 0.8-1.0; Cr on admission 1.5  -Suspect pre-renal azotemia and/or early ATN from sepsis   -Start gentle mNS @ 50 mL/h  -obtain urine electrolytes  -Repeat AM CMP and monitor renal fxn closely   -Review home medications once reconciled per pharmacy; avoid nephrotoxic agents as much as possible     #Atrial fibrillation with RVR  #Chronic anticoagulation with Eliquis  -Currently in atrial fibrillation, HR around  bpm; will continue to monitor, if HR stays ~ or >110 bpm sustained will give rate lowering agent  -Continue eliquis for stroke prophylaxis   -Review home medications once available   -Start gentle mNS as noted above   -Monitor closely on telemetry     #AMS, ? Metabolic encephalopathy   -CT of the head w/o contrast shows now acute  findings   -Currently A&O x 4   -Obtain neurochecks q4 hours   -Continue with treatment as outlined above  -UDS ordered     #Type 2 diabetes mellitus  -Obtain hemoglobin A1c  -SSI ordered; obtain POC glucose 4x daily  before meals and at bedtime; titrate insulin therapy as necessary; hypoglycemia protocol in place  -Review home regimen once reconciled per pharmacy     #Chronic diastolic CHF  -Appears euvolemic on exam   -TTE from 6/7/21 with EF 61-65%  -Monitor for signs of volume overload with daily weights, strict I's and O's       F/E/N: NS 50 mL/h. Replace electrolytes as necessary. Cardiac, consistent carb diet.   ---------------------------------------------------  DVT Prophylaxis: Eliquis  GI Prophylaxis: Protonix  Activity: Up with assistance, fall precautions; turn every 2 hours    The patient is considered to be a high risk patient due to: Sepsis POA secondary to right lower lobe pneumonia, acute UTI, DONNIE, atrial fibrillation with RVR    INPATIENT status due to the need for care which can only be reasonably provided in an hospital setting such as aggressive/expedited ancillary services and/or consultation services, the necessity for IV medications, close physician monitoring and/or the possible need for procedures.  In such, I feel patient’s risk for adverse outcomes and need for care warrant INPATIENT evaluation and predict the patient’s care encounter to likely last beyond 2 midnights.    Code Status: FULL CODE       I have discussed the patient's assessment and plan with patient, nursing staff, and attending physician      Kae Michael PA-C  Hospitalist Service -- [facility]       01/01/24  03:35 EST    Attending Physician: Izabel Interiano DO       Electronically signed by Izabel Interiano DO at 01/01/24 0637       Operative/Procedure Notes (most recent note)    No notes of this type exist for this encounter.          Physician Progress Notes (most recent note)         Tara Nino MD at 24 1203              [facility]     Patient Identification:  Name:  Mayra Hays  Age:  78 y.o.  Sex:  female  :  1945  MRN:  [id number]  Visit Number:  [id number]  Primary Care Provider:  Niki Antony APRN    Length of stay:  2    Subjective: Patient seen and examined, she is no longer dyspneic, awake and alert she reports she is weak, persistently hypoglycemic despite adjusting her dose of sliding scale.  Heart rate is well-controlled A-fib.    Chief Complaint: Short of breath  ----------------------------------------------------------------------------------------------------------------------  Current Hospital Meds:  apixaban, 5 mg, Oral, Q12H  arformoterol, 15 mcg, Nebulization, BID - RT  atorvastatin, 20 mg, Oral, Daily  budesonide, 0.5 mg, Nebulization, BID - RT  doxycycline, 100 mg, Intravenous, Q12H  hydrOXYzine, 25 mg, Oral, Nightly  influenza vaccine, 0.7 mL, Intramuscular, Once  insulin lispro, 2-9 Units, Subcutaneous, 4x Daily AC & at Bedtime  ipratropium, 0.5 mg, Nebulization, 4x Daily - RT  mupirocin, 1 application , Each Nare, BID  pantoprazole, 40 mg, Oral, Q AM  piperacillin-tazobactam, 3.375 g, Intravenous, Q8H  predniSONE, 30 mg, Oral, Daily With Breakfast  senna-docusate sodium, 2 tablet, Oral, BID  sodium chloride, 10 mL, Intravenous, Q12H         ----------------------------------------------------------------------------------------------------------------------  Vital Signs:  Temp:  [97.4 °F (36.3 °C)-97.9 °F (36.6 °C)] 97.8 °F (36.6 °C)  Heart Rate:  [] 79  Resp:  [18-22] 20  BP: (138-146)/(74-92) 143/87       Tele: Atrial fibrillation 84 bpm      24  0500 24  0500 24  0500   Weight: 98.3 kg (216 lb 11.2 oz) (Admit weight less than 24 hrs.) 99 kg (218 lb 3.2 oz) 100 kg (220 lb 11.2 oz)     Body mass index is 35.62 kg/m².    Intake/Output Summary (Last 24 hours) at 1/3/2024  1204  Last data filed at 1/3/2024 0900  Gross per 24 hour   Intake 1400 ml   Output --   Net 1400 ml     Diet: Cardiac Diets; Healthy Heart (2-3 Na+); Texture: Regular Texture (IDDSI 7); Fluid Consistency: Thin (IDDSI 0)  ----------------------------------------------------------------------------------------------------------------------  Physical exam:  General: Comfortable,awake, alert, oriented to self, place, and time, well-developed and well-nourished.  No respiratory distress.    Skin:  Skin is warm and dry. No rash noted. No pallor.    HENT:  Head:  Normocephalic and atraumatic.  Mouth:  Moist mucous membranes.    Eyes:  Conjunctivae and EOM are normal.  Pupils are equal, round, and reactive to light.  No scleral icterus.    Neck:  Neck supple.  No JVD present.  No bruit  Pulmonary/Chest:  No respiratory distress, no wheezes, no crackles, with normal breath sounds and good air movement.  Cardiovascular:  Normal rate, irregular irregular rhythm, no murmur.  Abdominal:  Soft.  Bowel sounds are normal.  No distension and no tenderness.   Extremities:  No edema, no tenderness, and no deformity.  No red or swollen joints anywhere.  Strong pulses in all 4 extremities with no clubbing, no cyanosis, no edema.  Neurological:  Motor strength equal no obvious deficit, sensory grossly intact.   No cranial nerve deficit.  No tongue deviation.  No facial droop.  No slurred speech.    Genitourinary: No Shetty catheter  Back:  ----------------------------------------------------------------------------------------------------------------------  ----------------------------------------------------------------------------------------------------------------------  Results from last 7 days   Lab Units 01/01/24  0602 01/01/24 0401 12/31/23  1815   HSTROP T ng/L 20* 20* 21*     Results from last 7 days   Lab Units 01/03/24  0132 01/02/24  0059 01/01/24 0401 12/31/23  2115 12/31/23  1815   CRP mg/dL 9.90*  --  26.30*  --  32.78*  "  LACTATE mmol/L  --   --   --  1.4  --    WBC 10*3/mm3 12.31* 6.70 10.42  --  12.42*   HEMOGLOBIN g/dL 10.5* 10.8* 10.7*  --  11.6*   HEMATOCRIT % 35.1 35.2 35.3  --  36.9   MCV fL 91.2 88.2 89.4  --  86.6   MCHC g/dL 29.9* 30.7* 30.3*  --  31.4*   PLATELETS 10*3/mm3 291 260 211  --  255     Results from last 7 days   Lab Units 12/31/23  1741   PH, ARTERIAL pH units 7.424   PO2 ART mm Hg 64.2*   PCO2, ARTERIAL mm Hg 34.0*   HCO3 ART mmol/L 22.2     Results from last 7 days   Lab Units 01/03/24  0132 01/02/24  0059 01/01/24  0401 12/31/23  1815   SODIUM mmol/L 139 140 139 137   POTASSIUM mmol/L 4.6 4.2 3.7 3.5   MAGNESIUM mg/dL 2.2  --   --   --    CHLORIDE mmol/L 106 108* 103 99   CO2 mmol/L 23.4 21.7* 18.1* 21.4*   BUN mg/dL 33* 22 21 27*   CREATININE mg/dL 1.33* 1.11* 1.05* 1.50*   CALCIUM mg/dL 9.6 9.5 8.4* 9.3   GLUCOSE mg/dL 291* 308* 138* 245*   ALBUMIN g/dL  --   --  3.1* 3.4*   BILIRUBIN mg/dL  --   --  0.3 0.4   ALK PHOS U/L  --   --  115 157*   AST (SGOT) U/L  --   --  14 18   ALT (SGPT) U/L  --   --  9 12   Estimated Creatinine Clearance: 42.3 mL/min (A) (by C-G formula based on SCr of 1.33 mg/dL (H)).    No results found for: \"AMMONIA\"      Blood Culture   Date Value Ref Range Status   12/31/2023 No growth at 2 days  Preliminary   12/31/2023 No growth at 2 days  Preliminary     Urine Culture   Date Value Ref Range Status   12/31/2023 <10,000 CFU/mL Normal Urogenital Yareli  Final     No results found for: \"WOUNDCX\"  No results found for: \"STOOLCX\"    I have personally looked at the labs and they are summarized above.  ----------------------------------------------------------------------------------------------------------------------  Imaging Results (Last 24 Hours)       ** No results found for the last 24 hours. **          ----------------------------------------------------------------------------------------------------------------------  Assessment and Plan:  -Acute on chronic hypoxic respiratory " failure  -Acute upper respiratory tract infection [place] HKU1  -History of lung cancer on chemotherapy  -Diabetes type II with hyperglycemia likely secondary to Solu-Medrol  -Immunocompromise state  -COPD with exacerbation  -Bilateral basal pneumonia present on admission  -History of paroxysmal atrial fibrillation with RVR now controlled  -Former tobacco use    Patient is stable now with O2 saturation of 96 to 97% on 2 L, heart rate is now controlled, will transition to prednisone oral for 5 days, continue neb treatment, CRP is trending down will DC Zosyn changed to Rocephin and monitor clinically.  Ulcer so far is negative.  Increase sliding scale for Accu-Chek dosing, review of A1c is poorly controlled until this admission likely from Solu-Medrol.    Disposition pending home      Tara Nino MD  01/03/24  12:04 EST    Electronically signed by Tara Nino MD at 01/03/24 1214          Consult Notes (most recent note)        Jessica Johnson APRN at 01/02/24 1120        Consult Orders    1. Inpatient Palliative Care MD Consult [[id number]] ordered by Kae Michael PA-C at 01/01/24 0404                 Palliative Care Initial Consult     Attending Physician: Tara Nino MD  Referring Provider: Tara Nino MD    assistance with advance directives, assistance with clarification of goals of care, and psychosocial support  Code Status:   Code Status and Medical Interventions:   Ordered at: 01/01/24 0109     Code Status (Patient has no pulse and is not breathing):    CPR (Attempt to Resuscitate)     Medical Interventions (Patient has pulse or is breathing):    Full Support      Advanced Directives: Advance Directive Status: Patient has advance directive, copy in chart   Healthcare surrogate: Son HERMINIO Cummings and daughter HERMINIO Hart Lay  Goals of Care: I was able to have a conversation with Mayra and her son and HERMINIO Leone to discuss Goals of care. Mayra confirms that she would  want to be resuscitated and to be placed on the ventilator. I discussed with her and son if they discussed long term goals and she and Parth state that she would not want to be on vent or to have a trach and peg.    HPI:  Mayra Hays is a 78 y.o. female admitted on 12/31/23 due to dysuria and shortness of breath. Mayra has a medical history of chronic diastolic CHF, non-small cell cancer of the right lung, paroxysmal atrial fibrillation, chronic anticoagulation with Eliquis, hyperlipidemia, essential hypertension, COPD, type 2 diabetes mellitus, GERD. Mayra had been experiencing dyspnea upon exertion for several days prior to admission, with productive cough.        ROS: Negative except as above in HPI.     Past Medical History:   Diagnosis Date    Arthritis     CHF (congestive heart failure)     Chronic anticoagulation     Eliquis    Chronic kidney disease     stage II/IIIa    Collapsed lung     COPD (chronic obstructive pulmonary disease)     Diabetes mellitus     TYPE 2    Elevated cholesterol     GERD (gastroesophageal reflux disease)     Hyperlipidemia     Hypertension     Non-small cell lung cancer RUL     Sleep apnea     non compliant with CPAP    Stroke 2019    Wears eyeglasses      Past Surgical History:   Procedure Laterality Date    BRONCHOSCOPY Bilateral 02/27/2023    Procedure: BRONCHOSCOPY WITH ENDOBRONCHIAL ULTRASOUND;  Surgeon: Abdulkadir Peter MD;  Location: [facility] OR;  Service: Pulmonary;  Laterality: Bilateral;    BRONCHOSCOPY WITH ION ROBOTIC ASSIST N/A 9/6/2023    Procedure: BRONCHOSCOPY WITH ION ROBOT AND EBUS;  Surgeon: John Clemens MD;  Location:  [facility];  Service: Robotics - Pulmonary;  Laterality: N/A;  Ion cath #6 - 0032,  #6 - 0030, cath guide # 0077. EBUS scope removed with balloon intact.    CARDIAC CATHETERIZATION N/A 08/22/2017    Procedure: Left Heart Cath;  Surgeon: Jatinder Matias MD;  [facility];  Service:     CARDIAC  CATHETERIZATION N/A 2021    Procedure: Left Heart Cath;  Surgeon: Tolu Steinberg MD;  [facility]:  [facility];  Service: Cardiology;  Laterality: N/A;    COLONOSCOPY      ENDOSCOPY      GALLBLADDER SURGERY      PORTACATH PLACEMENT N/A 2023    Procedure: INSERTION OF PORTACATH;  Surgeon: Petr Velásquez MD;  Location:  [facility] OR;  Service: General;  Laterality: N/A;    VENTRAL HERNIA REPAIR N/A 2020    Procedure: VENTRAL HERNIA REPAIR LAPAROSCOPIC WITH DAVINCI ROBOT;  Surgeon: Petr Velásquez MD;  Location:  [facility];  Service: DaVinci;  Laterality: N/A;     Social History     Socioeconomic History    Marital status: Single    Number of children: 6   Tobacco Use    Smoking status: Former     Packs/day: 3     Types: Cigarettes     Start date: 4/3/1998     Quit date: 2015     Years since quittin.0     Passive exposure: Past    Smokeless tobacco: Never   Vaping Use    Vaping Use: Never used   Substance and Sexual Activity    Alcohol use: No    Drug use: No    Sexual activity: Never     Family History   Problem Relation Age of Onset    Heart disease Mother         Rhianna sophia    Heart disease Father     Heart attack Father     Heart failure Father        Allergies   Allergen Reactions    Paclitaxel Anaphylaxis       Current Facility-Administered Medications   Medication Dose Route Frequency Provider Last Rate Last Admin    acetaminophen (TYLENOL) tablet 650 mg  650 mg Oral Q6H PRN Kae Michael PA-C   650 mg at 24 0505    apixaban (ELIQUIS) tablet 5 mg  5 mg Oral Q12H O'aMry Mac PA   5 mg at 24 0813    arformoterol (BROVANA) nebulizer solution 15 mcg  15 mcg Nebulization BID - RT Tara Nino MD   15 mcg at 24 0655    atorvastatin (LIPITOR) tablet 20 mg  20 mg Oral Daily O'Mary Mac PA   20 mg at 24 0813    sennosides-docusate (PERICOLACE) 8.6-50 MG per tablet 2 tablet  2 tablet Oral BID Izabel Interiano DO    2 tablet at 01/02/24 0813    And    polyethylene glycol (MIRALAX) packet 17 g  17 g Oral Daily PRN Izabel Interiano DO        And    bisacodyl (DULCOLAX) EC tablet 5 mg  5 mg Oral Daily PRN Izabel Interiano DO        And    bisacodyl (DULCOLAX) suppository 10 mg  10 mg Rectal Daily PRN Izabel Interiano DO        budesonide (PULMICORT) nebulizer solution 0.5 mg  0.5 mg Nebulization BID - RT Tara Nino MD   0.5 mg at 01/02/24 0655    calcium carbonate (TUMS) chewable tablet 500 mg (200 mg elemental)  2 tablet Oral TID PRN Kae Michael PA-C        dextrose (D50W) (25 g/50 mL) IV injection 25 g  25 g Intravenous Q15 Min PRN Izabel Interiano DO        dextrose (D50W) (25 g/50 mL) IV injection 25 g  25 g Intravenous Q15 Min PRN Tara Nino MD        dextrose (GLUTOSE) oral gel 15 g  15 g Oral Q15 Min PRN Izabel Interiano DO        dextrose (GLUTOSE) oral gel 15 g  15 g Oral Q15 Min PRN Tara Nino MD        doxycycline (VIBRAMYCIN) 100 mg in sodium chloride 0.9 % 100 mL IVPB-VTB  100 mg Intravenous Q12H Izabel Interiano DO   100 mg at 01/02/24 0409    glucagon HCl (Diagnostic) injection 1 mg  1 mg Intramuscular Q15 Min PRN Izabel Interiano DO        guaiFENesin-dextromethorphan (ROBITUSSIN DM) 100-10 MG/5ML syrup 5 mL  5 mL Oral Q4H PRN Kae Michael PA-C        hydrOXYzine (ATARAX) tablet 25 mg  25 mg Oral TID PRN Kae Michael PA-C        hydrOXYzine (ATARAX) tablet 25 mg  25 mg Oral Nightly MARCIO'Mary Mac PA   25 mg at 01/01/24 2101    Influenza Vac High-Dose Quad (FLUZONE HIGH DOSE) injection 0.7 mL  0.7 mL Intramuscular Once Kae Michael PA-C        Insulin Lispro (humaLOG) injection 2-9 Units  2-9 Units Subcutaneous 4x Daily AC & at Bedtime OculamTara MD   8 Units at 01/02/24 1120    ipratropium (ATROVENT) nebulizer solution 0.5 mg  0.5 mg Nebulization 4x Daily - RT Kae Michael PA-C   0.5 mg at  "01/02/24 0656    ipratropium-albuterol (DUO-NEB) nebulizer solution 3 mL  3 mL Nebulization Q6H PRN Izabel Interiano DO        melatonin tablet 10 mg  10 mg Oral Nightly PRN Kae Michael PA-C   10 mg at 01/01/24 2100    methylPREDNISolone sodium succinate (SOLU-Medrol) injection 40 mg  40 mg Intravenous Q12H Tara Nino MD   40 mg at 01/02/24 0306    nitroglycerin (NITROSTAT) SL tablet 0.4 mg  0.4 mg Sublingual Q5 Min PRN Izabel Interiano DO        pantoprazole (PROTONIX) EC tablet 40 mg  40 mg Oral Q AM Kae Michael PA-C   40 mg at 01/02/24 0505    piperacillin-tazobactam (ZOSYN) IVPB 3.375 g in 100 mL NS VTB  3.375 g Intravenous Q8H Izabel Interiano DO   3.375 g at 01/02/24 1120    sodium chloride 0.9 % flush 10 mL  10 mL Intravenous PRN Izabel Interiano DO        sodium chloride 0.9 % flush 10 mL  10 mL Intravenous Q12H Izabel Interiano DO   10 mL at 01/02/24 0813    sodium chloride 0.9 % flush 10 mL  10 mL Intravenous PRN Izabel Interiano DO        sodium chloride 0.9 % infusion 40 mL  40 mL Intravenous PRN Izabel Interiano DO              acetaminophen    senna-docusate sodium **AND** polyethylene glycol **AND** bisacodyl **AND** bisacodyl    calcium carbonate    dextrose    dextrose    dextrose    dextrose    glucagon (human recombinant)    guaiFENesin-dextromethorphan    hydrOXYzine    ipratropium-albuterol    melatonin    nitroglycerin    sodium chloride    sodium chloride    sodium chloride    Current medication reviewed for route, type, dose and frequency and are current per MAR.    Palliative Performance Scale Score:     /65 (BP Location: Left arm, Patient Position: Lying)   Pulse 104   Temp 97.4 °F (36.3 °C) (Oral)   Resp 20   Ht 167.6 cm (66\")   Wt 99 kg (218 lb 3.2 oz)   SpO2 94%   BMI 35.22 kg/m²     Intake/Output Summary (Last 24 hours) at 1/2/2024 1615  Last data filed at 1/2/2024 0345  Gross per 24 hour   Intake 460 ml "   Output 400 ml   Net 60 ml       PE:  General Appearance:    Chronically ill appearing, alert, cooperative, NAD   HEENT:    NC/AT, without obvious abnormality, EOMI, anicteric    Neck:   supple, trachea midline, no JVD   Lungs:     Unlabored respirations, no wheezing rhonchi or rales noted on RA, diminished in BL bases.    Heart:    Regular rate, irregular rhythm, normal S1 and S2, no M/R/G   Abdomen:     Soft, NT, ND, NABS    Extremities:   Moves all extremities, no edema   Pulses:   Pulses palpable and equal bilaterally   Skin:   Warm, dry   Neurologic:   A/Ox3, cooperative   Psych:   Calm, appropriate       Labs:   Results from last 7 days   Lab Units 01/02/24  0059   WBC 10*3/mm3 6.70   HEMOGLOBIN g/dL 10.8*   HEMATOCRIT % 35.2   PLATELETS 10*3/mm3 260     Results from last 7 days   Lab Units 01/02/24  0059   SODIUM mmol/L 140   POTASSIUM mmol/L 4.2   CHLORIDE mmol/L 108*   CO2 mmol/L 21.7*   BUN mg/dL 22   CREATININE mg/dL 1.11*   GLUCOSE mg/dL 308*   CALCIUM mg/dL 9.5     Results from last 7 days   Lab Units 01/02/24  0059 01/01/24  0401   SODIUM mmol/L 140 139   POTASSIUM mmol/L 4.2 3.7   CHLORIDE mmol/L 108* 103   CO2 mmol/L 21.7* 18.1*   BUN mg/dL 22 21   CREATININE mg/dL 1.11* 1.05*   CALCIUM mg/dL 9.5 8.4*   BILIRUBIN mg/dL  --  0.3   ALK PHOS U/L  --  115   ALT (SGPT) U/L  --  9   AST (SGOT) U/L  --  14   GLUCOSE mg/dL 308* 138*     Imaging Results (Last 72 Hours)       Procedure Component Value Units Date/Time    CT Head Without Contrast [[id number]] Collected: 01/01/24 0224     Updated: 01/01/24 0229    Narrative:      EXAMINATION: CT head without contrast     CLINICAL HISTORY: Mental status change. Unknown cause.     COMPARISON: 10/9/2023     TECHNIQUE: Contiguous 3 mm thick slices were obtained from the skull  base to the vertex without contrast. Coronal and sagittal reformats were  performed.     FINDINGS:  Brain volume is normal for age. There is no acute intracranial  hemorrhage. No acute  territorial infarct. No extra-axial collection. No  shift of midline structures. No acute calvarial fracture. The visualized  paranasal sinuses and the mastoid air cells are largely clear. Mildly  displaced nasal bone fractures appear similar to the study 10/9/2023.       Impression:      1. No acute intracranial process. If symptoms persist, MRI should be  considered for further evaluation.     This report was finalized on 1/1/2024 2:27 AM by Rajat Avila MD.       CT Chest Without Contrast Diagnostic [[id number]] Collected: 12/31/23 2035     Updated: 12/31/23 2041    Narrative:      INDICATION: Cough.     COMPARISON: CT chest from 12/19/2023     TECHNIQUE: Axial CT images of the chest were obtained without IV  contrast administration. Coronal and sagittal reformations were  reviewed.     FINDINGS:  The thoracic aorta appears normal in caliber. The heart is mildly  enlarged. No pericardial or pleural effusion. No pneumothorax. Enlarged  mediastinal lymph nodes measuring up to 1.5 cm.     Spiculated nodule in the right upper lobe measuring 1.9 cm.  Atelectasis/airspace disease in the lingula and left lower lobe.  Masslike consolidation in the right lower lobe, difficult evaluation  secondary to motion artifact and lack of IV contrast. Chest port noted.     Degenerative changes in the spine. No acute osseous abnormality evident.     Visualized upper abdominal structures appear unremarkable.       Impression:      Spiculated nodule of the right upper lobe concerning for neoplasm.  Masslike consolidation in the right lower lobe could represent pneumonia  with underlying mass not excluded. Atelectasis/airspace disease in the  left lower lobe and lingula. Mediastinal adenopathy. Follow-up  recommended.        This report was finalized on 12/31/2023 8:39 PM by Alex Pallas, DO.       XR Chest 1 View [[id number]] Collected: 12/31/23 1941     Updated: 12/31/23 1944    Narrative:      INDICATION: Shortness of breath.      TECHNIQUE: Frontal radiograph of the chest.     COMPARISON: 10/9/2023.     FINDINGS:   Cardiomegaly. Small bilateral pleural effusions with underlying  atelectasis/airspace disease. Chronic appearing interstitial lung  markings. No pneumothorax. Chest port catheter tip in the previously  healed. No acute fracture.       Impression:      Small bilateral pleural effusions with underlying atelectasis/airspace  disease.        This report was finalized on 12/31/2023 7:42 PM by Alex Pallas, DO.               Diagnostics: Reviewed    A: Mayra Hays is a 78 y.o. female admitted on 12/31/23 due to dysuria and shortness of breath. Mayra has a medical history of chronic diastolic CHF, non-small cell cancer of the right lung, paroxysmal atrial fibrillation, chronic anticoagulation with Eliquis, hyperlipidemia, essential hypertension, COPD, type 2 diabetes mellitus, GERD. Mayra had been experiencing dyspnea upon exertion for several days prior to admission, with productive cough.         P:   Palliative consulted for GOC/ACP, pain management and support for pt and family. I was able to have a conversation with Mayra and her son and HERMINIO Leone to discuss Goals of care. Mayra confirms that she would want to be resuscitated and to be placed on the ventilator. I discussed with her and son if they discussed long term goals and she and Parth state that she would not want to be on vent or to have a trach and peg. Mayra was only c/o of back pain, which she states is her normal and only take Tylenol for it and states that it is effective. I discussed Mayra with Dr Nino.      We appreciate the consult and the opportunity to participate in Mayra Hays's care. We will continue to follow along. Please do not hesitate to contact us regarding further symptom management or goals of care needs, including after hours or on weekends via our on call provider at [phone].     Time: 55 minutes spent reviewing medical and  medication records, assessing and examining patient, discussing with family, answering questions, providing some guidance about a plan and documentation of care, and coordinating care with other healthcare members, with > 50% time spent face to face.     ERASTO West    2024      Electronically signed by Jessica Johnson APRN at 24 1631          Physical Therapy Notes (most recent note)        Bonnie Guillen, PT at 24 1306  Version 1 of 1         Goal Outcome Evaluation:              Outcome Evaluation: Pt seen for therapy evaluation this date, pleasant and cooperative with therapy. Pt may benefit from therapeutic intervention during hospital stay to improve mobility and LE strength for functional mobility and independence.      Anticipated Discharge Disposition (PT): home with supervision, home with assist    Electronically signed by Bonnie Guillen PT at 24 1306       Occupational Therapy Notes (most recent note)    No notes exist for this encounter.          Discharge Summary        Tara Nino MD at 24 1129              [facility] MEDICINE DISCHARGE SUMMARY    Patient Identification:  Name:  Mayra Hays  Age:  78 y.o.  Sex:  female  :  1945  MRN:  [id number]  Visit Number:  [id number]    Date of Admission: 2023  Date of Discharge: 2024  DISCHARGE DISPOSITION   Stable  PCP: Niki Antony APRN    DISCHARGE DIAGNOSIS : Acute respiratory tract infection coronavirus HKU1, COPD exacerbation, diabetes type 2 with hyperglycemia, history of non-small cell CA right upper lobe on chemoradiation, bibasal pneumonia, troponin elevation flat, chronic.  History of paroxysmal atrial fibrillation with RVR present on admission resolved, chronic anticoagulation for stroke prophylaxis, acute metabolic encephalopathy from pneumonia and A-fib with RVR in respiratory infection resolved, history of chronic diastolic heart failure  compensated.  Acute kidney injury on CKD stage II-IIIa, masslike consolidation right lower lobe pneumonia versus mass, to be followed by oncology.  Mediastinal lymphadenopathy.  Acute on chronic hypoxic respiratory failure resolved      HOSPITAL COURSE  Patient is a 78 y.o. female presented to [facility] complaining of increasing shortness of breath coughing, mildly confused.  On arrival patient has A-fib with RVR, she does have history of atrial fibrillation, she was temporary placed on Cardizem drip.  Troponin was elevated but reviewing chart he has been chronically elevated, delta is negative, patient denies chest discomfort, she tested positive for coronavirus HKU1, she was placed on isolation, short course of systemic corticosteroids, diabetes was addressed with Accu-Chek and sliding scale, she was persistently hyperglycemic hence she was started on basal insulin her blood pressure was slightly elevated, she was started on Lopressor and so far is tolerated well, atrial fibrillation is now well-controlled and the majority of time is in sinus rhythm.  Initially patient has been noncompliant with her oxygen, family stated she was prescribed with oxygen at home which she never uses.  After counseling the patient she has been more compliant.  For more than 2 days she has been stable, O2 saturation has been more than 95 on 2 L nasal cannula, heart rate is well-controlled, cultures were negative, CT of the brain is negative.  Plan is to discharge her home today, due to changes on her medication including insulin, she will have home health care services for supervision, PT OT and home safety evaluation.appointment with primary care provider next week for blood pressure, BMP and Accu-Chek diary review.  With regards to the masslike consolidation in the right lung, this is concerning considering the patient has history of lung cancer, she has an appointment with oncology for review.      VITAL SIGNS:       01/02/24  0500 01/03/24  0500 01/04/24  0300   Weight: 99 kg (218 lb 3.2 oz) 100 kg (220 lb 11.2 oz) 100 kg (220 lb 14.4 oz)     Body mass index is 35.65 kg/m².  Vitals:    01/04/24 1000   BP: 142/90   Pulse: 100   Resp: 18   Temp: 98.1 °F (36.7 °C)   SpO2: 93%     PHYSICAL EXAM:  General: Comfortable,awake, alert, oriented to self, place, and time, chronically ill-appearing,  No respiratory distress.    Skin:  Skin is warm and dry. No rash noted. No pallor.    HENT:  Head:  Normocephalic and atraumatic.  Mouth:  Moist mucous membranes.    Eyes:  Conjunctivae and EOM are normal.  Pupils are equal, round, and reactive to light.  No scleral icterus.    Neck:  Neck supple.  No JVD present.    Pulmonary/Chest:  No respiratory distress, no wheezes, no crackles,  Rare wheezing left lung field basal area expiratory good air movement  Cardiovascular:  Normal rate, regular rhythm and normal heart sounds with no murmur.  Abdominal:  Soft.  Bowel sounds are normal.  No distension and no tenderness.   Extremities:  No edema, no tenderness, and no deformity.  No red or swollen joints anywhere.  Strong pulses in all 4 extremities with no clubbing, no cyanosis, no edema.  Neurological:  Motor strength equal no obvious deficit, sensory grossly intact.   No cranial nerve deficit.  No tongue deviation.  No facial droop.  No slurred speech.    Genitourinary: No Shetty catheter  Back:  ----------    DISCHARGE MEDICATIONS:     Discharge Medications        New Medications        Instructions Start Date   cefdinir 300 MG capsule  Commonly known as: OMNICEF   300 mg, Oral, 2 Times Daily      doxycycline 100 MG capsule  Commonly known as: VIBRAMYCIN   100 mg, Oral, 2 Times Daily      insulin glargine 100 UNIT/ML injection  Commonly known as: LANJONATHAN SEMGLEE   15 Units, Subcutaneous, Daily   Start Date: January 5, 2024     metoprolol tartrate 25 MG tablet  Commonly known as: LOPRESSOR   12.5 mg, Oral, Every 12 Hours Scheduled      predniSONE  10 MG tablet  Commonly known as: DELTASONE   30 mg, Oral, Daily With Breakfast   Start Date: January 5, 2024            Continue These Medications        Instructions Start Date   albuterol sulfate  (90 Base) MCG/ACT inhaler  Commonly known as: PROVENTIL HFA;VENTOLIN HFA;PROAIR HFA   2 puffs, Inhalation, Every 4 Hours PRN      apixaban 5 MG tablet tablet  Commonly known as: ELIQUIS   5 mg, Oral, Every 12 Hours Scheduled      atorvastatin 20 MG tablet  Commonly known as: LIPITOR   20 mg, Oral, Daily      Breztri Aerosphere 160-9-4.8 MCG/ACT aerosol inhaler  Generic drug: Budeson-Glycopyrrol-Formoterol   2 puffs, Inhalation, 2 Times Daily      hydrOXYzine pamoate 25 MG capsule  Commonly known as: VISTARIL   25 mg, Oral, Nightly      lisinopril 5 MG tablet  Commonly known as: PRINIVIL,ZESTRIL   5 mg, Oral, Daily      metFORMIN 500 MG tablet  Commonly known as: GLUCOPHAGE   500 mg, Oral, 2 Times Daily PRN      pantoprazole 40 MG EC tablet  Commonly known as: PROTONIX   40 mg, Oral, Daily      spironolactone 25 MG tablet  Commonly known as: ALDACTONE   25 mg, Oral, Daily             Stop These Medications      esomeprazole 20 MG capsule  Commonly known as: nexIUM     trimethoprim 100 MG tablet  Commonly known as: TRIMPEX              Diet Instructions    Cardiac         Your Scheduled Appointments      Jan 04, 2024  4:00 PM  FOLLOW UP with Shawna Bond MD  [facility] HEMATOLOGY & ONCOLOGY ([place]) [address]  [facility] [address] [phone]   Bring ID and  Insurance cards.  New patients are to arrive 30 minutes prior to the appointement.  If you received paperwork in the mail, fill it out and bring it with them.  All other appt's need to be here 15 minutes early.          Car 15, 2024  1:15 PM  Follow Up with Sabiha Valadez PA-C  [facility] PULMONARY & CRITICAL CARE MEDICINE ([place]) [address] [facility] [facility]  [facility] [id number] [phone]    Established: Please bring outside images or reports.         Jan 16, 2024  1:00 PM  Follow Up with ERASTO Rose  [facility] CARDIOTHORACIC SURGERY ([place]) [address]  [address]  [facility] [id number] [phone]   -Bring photo ID, insurance card, and list of medications to appointment  -If testing was completed outside of [facility] then patient must bring images on a disc  -Established patients should arrive 15 minutes prior to appointment         Jan 22, 2024  3:15 PM  VAD FLUSH with  COR OP INFUS CHAIR 27  [facility] OUTPATIENT INFUSION ([place]) [address]  [place] [address] [phone]        Mar 21, 2024 10:45 AM  FOLLOW UP with Fifi Fortune MD  [facility] RADIATION ONCOLOGY ([place]) [address]  [place] [address] [phone]        Apr 10, 2024  9:30 AM  ECHOCARDIOGRAM VISIT with COR ECHO/VAS/LUDY OP [facility] NONINVASIVE LAB ([place]) [address] [facility]  [place] [address] [phone]   Bring your insurance cards with you. Wear comfortable clothing and shoes.         May 02, 2024  2:00 PM  Follow Up with ERASTO Bloom  [facility] CARDIOLOGY ([place]) [address]  [place] [address] [phone]   -Bring photo ID, insurance card, and list of medications to appointment  -If testing was completed outside of [facility] then patient must bring images on a disc  -Copay will be collected at time of appointment  -Established patients should arrive 10 minutes prior to appointment       Additional instructions:    You have a follow-up appointment scheduled with DR Niki Antony on 1-11-24 at 2:30, office can be reached at [phone]         Activity Instructions    Activity as Tolerated         Additional Instructions for the Follow-ups that You Need to Schedule       Ambulatory Referral to Home Health   As directed      Face to Face Visit Date: 1/4/2024   Follow-up  provider for Plan of Care?: I treated the patient in an acute care facility and will not continue treatment after discharge.   Follow-up provider: МАРИЯ BURRIS [[id number]]   Reason/Clinical Findings: Diabetes with hyperglycemia, acute hypoxic respiratory failure, pneumonia, COPD exacerbation, medication supervision COPD management   Describe mobility limitations that make leaving home difficult: Patient has COPD and respiratory failure requires company to leave the house   Nursing/Therapeutic Services Requested: Physical Therapy Skilled Nursing Occupational Therapy   Skilled nursing orders: COPD management (Diabetes) Medication education   PT orders: Gait Training Strengthening   Weight Bearing Status: As Tolerated   Occupational orders: Activities of daily living Energy conservation Fine motor Home safety assessment   Frequency: 1 Week 1        Discharge Follow-up with PCP   As directed       Currently Documented PCP:    Мария Burris APRN    PCP Phone Number:    [phone]     Follow Up Details: ERASTO Arechiga (next week posthospitalization follow-up, Accu-Chek diary, BP check BMP check)        Discharge Follow-up with Specified Provider: Oncology   As directed      To: Oncology   Follow Up Details: As scheduled for history of lung cancer, review of CT scan right lower lobe?  Masslike               Follow-up Information       Мария uBrris APRN .    Specialty: Nurse Practitioner  Why: ERASTO Arechiga (next week posthospitalization follow-up, Accu-Chek diary, BP check BMP check)  Contact information:  [address]  [facility] [address] [phone]                             Pending Labs       Order Current Status    Blood Culture - Blood, Arm, Left Preliminary result    Blood Culture - Blood, Arm, Right Preliminary result             Tara Nino MD  01/04/24  11:40 EST    Please note that this discharge summary required more than 30 minutes to complete.      Electronically signed by  Harriett Nino MD at 01/04/24 1141       Discharge Order (From admission, onward)       Start     Ordered    01/04/24 1021  Discharge patient  Once        Expected Discharge Date: 01/04/24   Discharge Disposition: Home-Health Care [facility]   Physician of Record for Attribution - Please select from Treatment Team: HARRIETT NINO [[id number]]   Review needed by CMO to determine Physician of Record: No      Question Answer Comment   Physician of Record for Attribution - Please select from Treatment Team HARRIETT NINO    Review needed by CMO to determine Physician of Record No        01/04/24 1026

## 2024-01-04 NOTE — PLAN OF CARE
Goal Outcome Evaluation: Patient has been pleasant this shift, currently resting in bed on 2L NC, saturations maintaining above 90%. Patient to be transferred this shift to 3N. No acute s/s of distress at this time. VSS. Report called to 3N at this time.

## 2024-01-04 NOTE — DISCHARGE INSTR - APPOINTMENTS
You have a follow-up appointment scheduled with DR Niki Antony on 1-11-24 at 2:30, office can be reached at [phone]

## 2024-01-04 NOTE — DISCHARGE SUMMARY
[facility] HOSPITALIST MEDICINE DISCHARGE SUMMARY    Patient Identification:  Name:  Mayra Hays  Age:  78 y.o.  Sex:  female  :  1945  MRN:  [id number]  Visit Number:  [id number]    Date of Admission: 2023  Date of Discharge: 2024  DISCHARGE DISPOSITION   Stable  PCP: Niki Antony APRN    DISCHARGE DIAGNOSIS : Acute respiratory tract infection coronavirus HKU1, COPD exacerbation, diabetes type 2 with hyperglycemia, history of non-small cell CA right upper lobe on chemoradiation, bibasal pneumonia, troponin elevation flat, chronic.  History of paroxysmal atrial fibrillation with RVR present on admission resolved, chronic anticoagulation for stroke prophylaxis, acute metabolic encephalopathy from pneumonia and A-fib with RVR in respiratory infection resolved, history of chronic diastolic heart failure compensated.  Acute kidney injury on CKD stage II-IIIa, masslike consolidation right lower lobe pneumonia versus mass, to be followed by oncology.  Mediastinal lymphadenopathy.  Acute on chronic hypoxic respiratory failure resolved      HOSPITAL COURSE  Patient is a 78 y.o. female presented to [facility] complaining of increasing shortness of breath coughing, mildly confused.  On arrival patient has A-fib with RVR, she does have history of atrial fibrillation, she was temporary placed on Cardizem drip.  Troponin was elevated but reviewing chart he has been chronically elevated, delta is negative, patient denies chest discomfort, she tested positive for coronavirus HKU1, she was placed on isolation, short course of systemic corticosteroids, diabetes was addressed with Accu-Chek and sliding scale, she was persistently hyperglycemic hence she was started on basal insulin her blood pressure was slightly elevated, she was started on Lopressor and so far is tolerated well, atrial fibrillation is now well-controlled and the majority of time is in sinus rhythm.   Initially patient has been noncompliant with her oxygen, family stated she was prescribed with oxygen at home which she never uses.  After counseling the patient she has been more compliant.  For more than 2 days she has been stable, O2 saturation has been more than 95 on 2 L nasal cannula, heart rate is well-controlled, cultures were negative, CT of the brain is negative.  Plan is to discharge her home today, due to changes on her medication including insulin, she will have home health care services for supervision, PT OT and home safety evaluation.appointment with primary care provider next week for blood pressure, BMP and Accu-Chek diary review.  With regards to the masslike consolidation in the right lung, this is concerning considering the patient has history of lung cancer, she has an appointment with oncology for review.      VITAL SIGNS:      01/02/24  0500 01/03/24  0500 01/04/24  0300   Weight: 99 kg (218 lb 3.2 oz) 100 kg (220 lb 11.2 oz) 100 kg (220 lb 14.4 oz)     Body mass index is 35.65 kg/m².  Vitals:    01/04/24 1000   BP: 142/90   Pulse: 100   Resp: 18   Temp: 98.1 °F (36.7 °C)   SpO2: 93%     PHYSICAL EXAM:  General: Comfortable,awake, alert, oriented to self, place, and time, chronically ill-appearing,  No respiratory distress.    Skin:  Skin is warm and dry. No rash noted. No pallor.    HENT:  Head:  Normocephalic and atraumatic.  Mouth:  Moist mucous membranes.    Eyes:  Conjunctivae and EOM are normal.  Pupils are equal, round, and reactive to light.  No scleral icterus.    Neck:  Neck supple.  No JVD present.    Pulmonary/Chest:  No respiratory distress, no wheezes, no crackles,  Rare wheezing left lung field basal area expiratory good air movement  Cardiovascular:  Normal rate, regular rhythm and normal heart sounds with no murmur.  Abdominal:  Soft.  Bowel sounds are normal.  No distension and no tenderness.   Extremities:  No edema, no tenderness, and no deformity.  No red or swollen  joints anywhere.  Strong pulses in all 4 extremities with no clubbing, no cyanosis, no edema.  Neurological:  Motor strength equal no obvious deficit, sensory grossly intact.   No cranial nerve deficit.  No tongue deviation.  No facial droop.  No slurred speech.    Genitourinary: No Shetty catheter  Back:  ----------    DISCHARGE MEDICATIONS:     Discharge Medications        New Medications        Instructions Start Date   cefdinir 300 MG capsule  Commonly known as: OMNICEF   300 mg, Oral, 2 Times Daily      doxycycline 100 MG capsule  Commonly known as: VIBRAMYCIN   100 mg, Oral, 2 Times Daily      insulin glargine 100 UNIT/ML injection  Commonly known as: LANTUS, SEMGLEE   15 Units, Subcutaneous, Daily   Start Date: January 5, 2024     metoprolol tartrate 25 MG tablet  Commonly known as: LOPRESSOR   12.5 mg, Oral, Every 12 Hours Scheduled      predniSONE 10 MG tablet  Commonly known as: DELTASONE   30 mg, Oral, Daily With Breakfast   Start Date: January 5, 2024            Continue These Medications        Instructions Start Date   albuterol sulfate  (90 Base) MCG/ACT inhaler  Commonly known as: PROVENTIL HFA;VENTOLIN HFA;PROAIR HFA   2 puffs, Inhalation, Every 4 Hours PRN      apixaban 5 MG tablet tablet  Commonly known as: ELIQUIS   5 mg, Oral, Every 12 Hours Scheduled      atorvastatin 20 MG tablet  Commonly known as: LIPITOR   20 mg, Oral, Daily      Breztri Aerosphere 160-9-4.8 MCG/ACT aerosol inhaler  Generic drug: Budeson-Glycopyrrol-Formoterol   2 puffs, Inhalation, 2 Times Daily      hydrOXYzine pamoate 25 MG capsule  Commonly known as: VISTARIL   25 mg, Oral, Nightly      lisinopril 5 MG tablet  Commonly known as: PRINIVIL,ZESTRIL   5 mg, Oral, Daily      metFORMIN 500 MG tablet  Commonly known as: GLUCOPHAGE   500 mg, Oral, 2 Times Daily PRN      pantoprazole 40 MG EC tablet  Commonly known as: PROTONIX   40 mg, Oral, Daily      spironolactone 25 MG tablet  Commonly known as: ALDACTONE   25 mg,  Oral, Daily             Stop These Medications      esomeprazole 20 MG capsule  Commonly known as: nexIUM     trimethoprim 100 MG tablet  Commonly known as: TRIMPEX              Diet Instructions    Cardiac         Your Scheduled Appointments      Jan 04, 2024  4:00 PM  FOLLOW UP with Shawna Bond MD  [facility] HEMATOLOGY & ONCOLOGY ([place]) [address]  [facility] [id number]  [phone]   Bring ID and  Insurance cards.  New patients are to arrive 30 minutes prior to the appointement.  If you received paperwork in the mail, fill it out and bring it with them.  All other appt's need to be here 15 minutes early.          Car 15, 2024  1:15 PM  Follow Up with Sabiha Valadez PA-C  [facility] PULMONARY & CRITICAL CARE MEDICINE ([place]) [address]  [facility] [id number] [phone]   Established: Please bring outside images or reports.         Jan 16, 2024  1:00 PM  Follow Up with ERASTO Rose  [facility] CARDIOTHORACIC SURGERY ([place]) [address] [place] [address]  [address]  [facility] [id number]  [phone]   -Bring photo ID, insurance card, and list of medications to appointment  -If testing was completed outside of [facility] then patient must bring images on a disc  -Established patients should arrive 15 minutes prior to appointment         Jan 22, 2024  3:15 PM  VAD FLUSH with [facility] OP INFUS CHAIR 27  [facility] OUTPATIENT INFUSION ([place]) [address]  [facility] [id number] [phone]        Mar 21, 2024 10:45 AM  FOLLOW UP with Fifi Fortune MD  [facility] RADIATION ONCOLOGY ([place]) [address]  [facility] [address] [phone]        Apr 10, 2024  9:30 AM  ECHOCARDIOGRAM VISIT with COR ECHO/VAS/LUDY OP [facility] NONINVASIVE LAB ([place]) [address]  [facility] [id number] [phone]   Bring your insurance cards with you. Wear comfortable clothing and  ana paula.         May 02, 2024  2:00 PM  Follow Up with ERASTO Bloom  [facility] CARDIOLOGY ([place]) [address] [place] [address] [phone]   -Bring photo ID, insurance card, and list of medications to appointment  -If testing was completed outside of [facility] then patient must bring images on a disc  -Copay will be collected at time of appointment  -Established patients should arrive 10 minutes prior to appointment       Additional instructions:    You have a follow-up appointment scheduled with DR Niki Burris on 1-11-24 at 2:30, office can be reached at [phone]         Activity Instructions    Activity as Tolerated         Additional Instructions for the Follow-ups that You Need to Schedule       Ambulatory Referral to Home Health   As directed      Face to Face Visit Date: 1/4/2024   Follow-up provider for Plan of Care?: I treated the patient in an acute care facility and will not continue treatment after discharge.   Follow-up provider: NIKI BURRIS [[id number]]   Reason/Clinical Findings: Diabetes with hyperglycemia, acute hypoxic respiratory failure, pneumonia, COPD exacerbation, medication supervision COPD management   Describe mobility limitations that make leaving home difficult: Patient has COPD and respiratory failure requires company to leave the house   Nursing/Therapeutic Services Requested: Physical Therapy Skilled Nursing Occupational Therapy   Skilled nursing orders: COPD management (Diabetes) Medication education   PT orders: Gait Training Strengthening   Weight Bearing Status: As Tolerated   Occupational orders: Activities of daily living Energy conservation Fine motor Home safety assessment   Frequency: 1 Week 1        Discharge Follow-up with PCP   As directed       Currently Documented PCP:    Niki Burris APRN    PCP Phone Number:    [phone]     Follow Up Details: ERASTO Arechiga (next week posthospitalization follow-up, Accu-Chek  diary, BP check BMP check)        Discharge Follow-up with Specified Provider: Oncology   As directed      To: Oncology   Follow Up Details: As scheduled for history of lung cancer, review of CT scan right lower lobe?  Masslike               Follow-up Information       Niki Antony APRN .    Specialty: Nurse Practitioner  Why: ERASTO Arechiga (next week posthospitalization follow-up, Accu-Chek diary, BP check BMP check)  Contact information:  [address]  [facility] [address]  [phone]                             Pending Labs       Order Current Status    Blood Culture - Blood, Arm, Left Preliminary result    Blood Culture - Blood, Arm, Right Preliminary result             Tara Nino MD  01/04/24  11:40 EST    Please note that this discharge summary required more than 30 minutes to complete.

## 2024-01-04 NOTE — CASE MANAGEMENT/SOCIAL WORK
Discharge Planning Assessment   [place]     Patient Name: Mayra Hays  MRN: [id number]  Today's Date: 1/4/2024    Admit Date: 12/31/2023         Discharge Plan       Row Name 01/04/24 1046       Plan    Final Discharge Disposition Code 06 - home with home health care    Final Note Pt is being discharged home with Physician ordered home health services. Pt and Pt's POA is aware and agreeable to discharge. POA does not have a preference of  agency. SS faxed referral to [facility] fax [phone]. SS to provide Lead RN with report number for HH agency once received official acceptance. Pt's family to provide transportation.    14:41pm: [facility] per [facility] referral was not received. SS re-faxed referral to fax [phone].                    KASH HendersonW

## 2024-01-04 NOTE — DISCHARGE PLACEMENT REQUEST
"Charles Archer (78 y.o. Female)       Date of Birth   1945    Social Security Number       Address   [address] [address]    Home Phone   [phone]    MRN   [id number]       Anabaptist   None    Marital Status   Single                            Admission Date   12/31/23    Admission Type   Emergency    Admitting Provider   Izabel Interiano DO    Attending Provider   Tara Nino MD    Department, Room/Bed   [address], 3338/1P       Discharge Date       [place] [facility] [place]                                 Attending Provider: Tara Nino MD    Allergies: Paclitaxel    Isolation: Contact   Infection: Bed Bugs (10/13/23)   Code Status: CPR    Ht: 167.6 cm (66\")   Wt: 100 kg (220 lb 14.4 oz)    Admission Cmt: None   Principal Problem: None                  Active Insurance as of 12/31/2023       Primary Coverage       Payor Plan Insurance Group Employer/Plan Group    ANTHEM MEDICARE REPLACEMENT ANTHEM MEDICARE ADVANTAGE [id number]       Payor Plan Address Payor Plan Phone Number Payor Plan Fax Number Effective Dates    [address] [phone]  2/1/2023 - None Entered    [facility] [id number]         Subscriber Name Subscriber Birth Date Member ID       CHARLES ARCHER 1945 [id number]               Secondary Coverage       Payor Plan Insurance Group Employer/Plan Group    [place]        Payor Plan Address Payor Plan Phone Number Payor Plan Fax Number Effective Dates    [address]   1/1/2014 - None Entered    [facility] [address]         Subscriber Name Subscriber Birth Date Member ID       CHARLES ARCHER 1945 [id number]                     Emergency Contacts        (Rel.) Home Phone Work Phone Mobile Phone    Hailey Quinonez (POA) (Power of ) [phone] -- --    PHIL PRASAD (alternative POA) (Power of ) -- -- " [phone]    CHELO PRASAD (Daughter) [phone] -- --          [address] [address]  Phone:  [phone]  Fax:  [phone] Date: 2024      Ambulatory Referral to Home Health     Patient:  Mayra Hays MRN:  [id number]   [address]    [facility] [address] :  1945  SSN:    Phone: [phone] Sex:  F      INSURANCE PAYOR PLAN GROUP # SUBSCRIBER ID   Primary:  Secondary:    ANTHEM MEDICARE REPLACEMENT  AETNA [facility] [id number]  [id number]    [id number]  [id number]      Referring Provider Information:  HARRIETT GUERRIER Phone: [phone] Fax: [phone]       Referral Information:   # Visits:  999 Referral Type: Home Health [42]   Urgency:  Routine Referral Reason: Specialty Services Required   Start Date: 2024 End Date:  To be determined by Insurer   Diagnosis: Chronic obstructive pulmonary disease, unspecified COPD type (J44.9 [ICD-10-CM] 496 [ICD-9-CM])  Chronic respiratory failure with hypoxia (J96.11 [ICD-10-CM] 518.83,799.02 [ICD-9-CM])  Type 2 diabetes mellitus without complication, without long-term current use of insulin (E11.9 [ICD-10-CM] 250.00 [ICD-9-CM])      Refer to Dept:   Refer to Provider:   Refer to Provider Phone:   Refer to Facility:       Face to Face Visit Date: 2024  Follow-up provider for Plan of Care? I treated the patient in an acute care facility and will not continue treatment after discharge.  Follow-up provider: МАРИЯ BURRIS [[id number]]  Reason/Clinical Findings: Diabetes with hyperglycemia, acute hypoxic respiratory failure, pneumonia, COPD exacerbation, medication supervision COPD management  Describe mobility limitations that make leaving home difficult: Patient has COPD and respiratory failure requires company to leave the house  Nursing/Therapeutic Services Requested: Physical Therapy  Nursing/Therapeutic Services Requested: Skilled Nursing  Nursing/Therapeutic  Services Requested: Occupational Therapy  Skilled nursing orders: COPD management (Diabetes)  Skilled nursing orders: Medication education  PT orders: Gait Training  PT orders: Strengthening  Weight Bearing Status: As Tolerated  Occupational orders: Activities of daily living  Occupational orders: Energy conservation  Occupational orders: Fine motor  Occupational orders: Home safety assessment  Frequency: 1 Week 1     This document serves as a request of services and does not constitute Insurance authorization or approval of services.  To determine eligibility, please contact the members Insurance carrier to verify and review coverage.     If you have medical questions regarding this request for services. Please contact [address] at [phone] during normal business hours.        Authorizing Provider:Tara Nino MD  Authorizing Provider's NPI: [id number]  Order Entered By: Tara Nino MD 1/4/2024 10:26 AM     Electronically signed by: Tara Nino MD 1/4/2024 10:26 AM          History & Physical        Fernanda, Kae Blackburn PA-C at 01/01/24 0139       Attestation signed by Izabel Interiano DO at 01/01/24 0696    I have reviewed this documentation and agree.  Patient also briefly seen and examined at bedside in 319.  Patient is sleeping comfortably but is easily awakened.  The patient states that overall she is feeling better compared to admission and she is hopeful to be discharged home soon.  She currently denies any shortness of air and/or cough.  She stated that she felt nauseated prior to her arrival to the ED but currently denies any nausea.    Pertinent physical exam findings reveal scattered bilateral rhonchi with left lower lobe rales and occasional expiratory wheezing.  Heart was irregular rate and irregular rhythm without obvious murmur. No significant lower extremity edema; AxO x3.      Continue empiric antibiotic therapy with Zosyn and doxycycline for  now for treatment of suspected postobstructive versus community-acquired pneumonia.  Continue supportive care for coronavirus H KU 1 infection.  Will add p.o. prednisone given active wheezing on exam.  Will also go ahead and discontinue her IV fluids as DONNIE has essentially resolved and creatinine is back at/near baseline.  Agree with cardiology consultation as I am unable to view other EKGs with evidence of a left bundle branch block, however, per my view of patient's EKG at present she appears to have an incomplete left bundle branch block.                     [facility] Medicine Services  HISTORY & PHYSICAL    Patient Identification:  Name:  Mayra Hays  Age:  78 y.o.  Sex:  female  :  1945  MRN:  [id number]   Visit Number:  [id number]  Admit Date: 2023   Primary Care Physician:  Niki Antony APRN     Subjective     Chief complaint:   Chief Complaint   Patient presents with    Dysuria    Shortness of Breath     History of presenting illness:   Patient is a 78 y.o. female with past medical history significant for chronic diastolic CHF, non-small cell cancer of the right lung, paroxysmal atrial fibrillation, chronic anticoagulation with Eliquis, hyperlipidemia, essential hypertension, COPD, type 2 diabetes mellitus, GERD, that presented to the [facility] emergency department for evaluation of dysuria and dyspnea.    The patient reports over the past 3 to 4 days she has been feeling ill with dyspnea upon exertion, and productive cough with yellow/green sputum and dysuria.  She called her PCP on Friday () who called her in a prescription for a UTI.  The patient does not believe this medication was sent and and therefore has not been taking it.  She denies any fever, chills, wheezing, chest pain, leg edema, abdominal pain, nausea, vomiting, diarrhea, urinary frequency, malodorous urine.  She has been taking her medications as prescribed despite having a decreased  appetite.  She has been using her albuterol nebulizer treatments at home around 2 times a day.  She denies any chronic oxygen use.  She is currently alert and oriented to herself, birthday, month, year, and daughter-in-law at bedside.    Upon arrival to the ED, vitals were temperature 98.8 °F, pulse 114, respirations 17, /61, SpO2 89% on room air.    In the emergency department the patient received IV Rocephin 1 g, IV levofloxacin 750 mg,  mL bolus, IV vancomycin    Patient has been admitted to the medical telemetry floor for further evaluation and treatment.  Patient seen and evaluated in the emergency department room 109.  Patient's daughter-in-law present at bedside during exam.  ---------------------------------------------------------------------------------------------------------------------   Review of Systems   Constitutional:  Positive for appetite change and fatigue. Negative for chills, diaphoresis and fever.   HENT:  Negative for rhinorrhea and sore throat.    Respiratory:  Positive for cough (Productive with yellow/green sputum) and shortness of breath (With exertion). Negative for wheezing.    Cardiovascular:  Negative for chest pain, palpitations and leg swelling.   Gastrointestinal:  Negative for abdominal pain, constipation, diarrhea and vomiting.   Genitourinary:  Positive for dysuria. Negative for frequency.   Neurological:  Negative for dizziness and light-headedness.   Psychiatric/Behavioral:  Negative for confusion.       ---------------------------------------------------------------------------------------------------------------------   Past Medical History:   Diagnosis Date    Arthritis     CHF (congestive heart failure)     Chronic anticoagulation     Eliquis    Chronic kidney disease     stage II/IIIa    Collapsed lung     COPD (chronic obstructive pulmonary disease)     Diabetes mellitus     TYPE 2    Elevated cholesterol     GERD (gastroesophageal reflux disease)      Hyperlipidemia     Hypertension     Non-small cell lung cancer RUL     Sleep apnea     non compliant with CPAP    Stroke 2019    Wears eyeglasses      Past Surgical History:   Procedure Laterality Date    BRONCHOSCOPY Bilateral 2023    Procedure: BRONCHOSCOPY WITH ENDOBRONCHIAL ULTRASOUND;  Surgeon: Abdulkadir Peter MD;  Location:  [facility];  Service: Pulmonary;  Laterality: Bilateral;    BRONCHOSCOPY WITH ION ROBOTIC ASSIST N/A 2023    Procedure: BRONCHOSCOPY WITH ION ROBOT AND EBUS;  Surgeon: John Clemens MD;  Location:  [facility];  Service: Robotics - Pulmonary;  Laterality: N/A;  Ion cath #6 - 0032,  #6 - 0030, cath guide # 0077. EBUS scope removed with balloon intact.    CARDIAC CATHETERIZATION N/A 2017    Procedure: Left Heart Cath;  Surgeon: Jatinder Matias MD;  Location:  [facility];  Service:     CARDIAC CATHETERIZATION N/A 2021    Procedure: Left Heart Cath;  Surgeon: Tolu Steinberg MD;  [facility]:  [facility];  Service: Cardiology;  Laterality: N/A;    COLONOSCOPY      ENDOSCOPY      GALLBLADDER SURGERY      PORTACATH PLACEMENT N/A 2023    Procedure: INSERTION OF PORTACATH;  Surgeon: Petr Velásquez MD;  Location:  [facility];  Service: General;  Laterality: N/A;    VENTRAL HERNIA REPAIR N/A 2020    Procedure: VENTRAL HERNIA REPAIR LAPAROSCOPIC WITH DAVINCI ROBOT;  Surgeon: Petr Velásquez MD;  Location:  [facility];  Service: DaVinci;  Laterality: N/A;     Family History   Problem Relation Age of Onset    Heart disease Mother         Rhianna sophia    Heart disease Father     Heart attack Father     Heart failure Father      Social History     Socioeconomic History    Marital status: Single    Number of children: 6   Tobacco Use    Smoking status: Former     Packs/day: 3     Types: Cigarettes     Start date: 4/3/1998     Quit date: 2015     Years since quittin.0     Passive exposure: Past    Smokeless  tobacco: Never   Vaping Use    Vaping Use: Never used   Substance and Sexual Activity    Alcohol use: No    Drug use: No    Sexual activity: Never     ---------------------------------------------------------------------------------------------------------------------   Allergies:  Paclitaxel  ---------------------------------------------------------------------------------------------------------------------   Medications below are reported home medications pulling from within the system; at this time, these medications have not been reconciled unless otherwise specified and are in the verification process for further verifcation as current home medications.    Prior to Admission Medications       Prescriptions Last Dose Informant Patient Reported? Taking?    acetaminophen (TYLENOL) 325 MG tablet   No No    Take 2 tablets by mouth Every 4 (Four) Hours As Needed for Mild Pain.    albuterol sulfate  (90 Base) MCG/ACT inhaler  Self No No    Inhale 2 puffs Every 4 (Four) Hours As Needed for Wheezing or Shortness of Air.    apixaban (ELIQUIS) 5 MG tablet tablet   No No    Take 1 tablet by mouth Every 12 (Twelve) Hours.    atorvastatin (LIPITOR) 20 MG tablet   No No    Take 1 tablet by mouth Daily.    Budeson-Glycopyrrol-Formoterol (Breztri Aerosphere) 160-9-4.8 MCG/ACT aerosol inhaler  Self No No    Inhale 2 puffs 2 (Two) Times a Day.    esomeprazole (nexIUM) 20 MG capsule  Self Yes No    Take 1 capsule by mouth Daily.    ibuprofen (ADVIL,MOTRIN) 600 MG tablet   No No    Take 1 tablet by mouth Every 6 (Six) Hours As Needed for Mild Pain.    ipratropium-albuterol (DUO-NEB) 0.5-2.5 mg/3 ml nebulizer  Self No No    USE 1 VIAL IN NEBULIZER 4 TIMES DAILY - and as needed    lidocaine-prilocaine (EMLA) 2.5-2.5 % cream   No No    Apply to port-a-cath site 30 minutes prior to arrival at infusion center. Cover with plastic wrap.    lisinopril (PRINIVIL,ZESTRIL) 5 MG tablet   No No    Take 1 tablet by mouth Daily.     loratadine (CLARITIN) 10 MG tablet  Self Yes No    Take 1 tablet by mouth Daily As Needed for Allergies.    methocarbamol (Robaxin) 500 MG tablet  Self No No    Take 1 tablet by mouth 3 (Three) Times a Day As Needed for Muscle Spasms.    O2 (OXYGEN)  Self Yes No    Inhale 2 L/min As Needed.    ondansetron (Zofran) 8 MG tablet   No No    Take 1 tablet by mouth Every 8 (Eight) Hours As Needed for Nausea or Vomiting.    pantoprazole (PROTONIX) 40 MG EC tablet  Self Yes No    Take 1 tablet by mouth Daily As Needed (acid reflux).    potassium chloride (K-DUR,KLOR-CON) 20 MEQ CR tablet  Self Yes No    Take 1 tablet by mouth Daily.    prochlorperazine (COMPAZINE) 10 MG tablet   No No    Take 1 tablet by mouth Every 6 (Six) Hours As Needed for Nausea or Vomiting.    Stool Softener 100 MG capsule   Yes No    Take 1 capsule by mouth 2 (two) times a day.    trimethoprim (TRIMPEX) 100 MG tablet  Self No No    Take 1 tablet by mouth 2 (Two) Times a Day.          ---------------------------------------------------------------------------------------------------------------------    Objective     Hospital Scheduled Meds:  doxycycline, 100 mg, Intravenous, Q12H  heparin (porcine), 5,000 Units, Subcutaneous, Q12H  insulin lispro, 2-7 Units, Subcutaneous, 4x Daily AC & at Bedtime  Pharmacy to Dose LevoFLOXacin (LEVAQUIN), , Does not apply, Once  piperacillin-tazobactam, 3.375 g, Intravenous, Once  piperacillin-tazobactam, 3.375 g, Intravenous, Q8H  senna-docusate sodium, 2 tablet, Oral, BID  sodium chloride, 10 mL, Intravenous, Q12H           Current listed hospital scheduled medications may not yet reflect those currently placed in orders that are signed and held, awaiting patient's arrival to floor/unit.    ---------------------------------------------------------------------------------------------------------------------   Vital Signs:  Temp:  [97.6 °F (36.4 °C)-98.8 °F (37.1 °C)] 97.6 °F (36.4 °C)  Heart Rate:  []  106  Resp:  [17-22] 22  BP: (112-136)/(60-85) 127/69  Mean Arterial Pressure (Non-Invasive) for the past 24 hrs (Last 3 readings):   Noninvasive MAP (mmHg)   01/01/24 0328 89   12/31/23 2220 101   12/31/23 2200 88     SpO2 Percentage    12/31/23 2200 12/31/23 2220 01/01/24 0328   SpO2: 95% 97% 97%     SpO2:  [89 %-97 %] 97 %  on   ;   Device (Oxygen Therapy): room air    Body mass index is 34.98 kg/m².  Wt Readings from Last 3 Encounters:   01/01/24 98.3 kg (216 lb 11.2 oz)   12/26/23 99.6 kg (219 lb 9.6 oz)   12/21/23 102 kg (224 lb 3.2 oz)     ---------------------------------------------------------------------------------------------------------------------   Physical Exam:  Physical Exam  Vitals and nursing note reviewed.   Constitutional:       General: She is not in acute distress.     Appearance: She is well-developed. She is not diaphoretic.   HENT:      Head: Normocephalic and atraumatic.      Right Ear: External ear normal.      Left Ear: External ear normal.      Nose: Nose normal.   Eyes:      Conjunctiva/sclera: Conjunctivae normal.      Pupils: Pupils are equal, round, and reactive to light.   Neck:      Vascular: No JVD.      Trachea: No tracheal deviation.   Cardiovascular:      Rate and Rhythm: Normal rate and regular rhythm.      Heart sounds: Normal heart sounds. No murmur heard.  Pulmonary:      Effort: Pulmonary effort is normal. No respiratory distress.      Breath sounds: Examination of the right-upper field reveals rhonchi. Examination of the left-upper field reveals rhonchi. Examination of the left-lower field reveals rales. Rhonchi and rales present.   Abdominal:      General: Bowel sounds are normal.      Palpations: Abdomen is soft.      Tenderness: There is no abdominal tenderness.   Musculoskeletal:         General: No deformity. Normal range of motion.      Cervical back: Normal range of motion and neck supple.   Skin:     General: Skin is warm and dry.      Coloration: Skin is not  pale.      Findings: No erythema or rash.   Neurological:      General: No focal deficit present.      Mental Status: She is alert and oriented to person, place, and time. Mental status is at baseline.      Cranial Nerves: No cranial nerve deficit.   Psychiatric:         Behavior: Behavior normal.         Thought Content: Thought content normal.       ---------------------------------------------------------------------------------------------------------------------  EKG:    Pending cardiology read.  Per my evaluation, atrial fibrillation with RVR, left bundle branch block, heart rate 121, QTc 477 MS.  When compared to EKG from 10/9/2023, atrial fibrillation has now replaced normal sinus rhythm.  There also appears to be a new left bundle branch block that was not present when compared to prior EKGs from 8/2023, 9/2023, and 10/2023    Telemetry:    Atrial fibrillation with RVR, heart rate 103, SpO2 95% on room air    I have personally reviewed the EKG/Telemetry strip  ---------------------------------------------------------------------------------------------------------------------   Results from last 7 days   Lab Units 12/31/23  1815   HSTROP T ng/L 21*     Results from last 7 days   Lab Units 12/31/23  1815   PROBNP pg/mL 2,015.0*       Results from last 7 days   Lab Units 12/31/23  1741   PH, ARTERIAL pH units 7.424   PO2 ART mm Hg 64.2*   PCO2, ARTERIAL mm Hg 34.0*   HCO3 ART mmol/L 22.2     Results from last 7 days   Lab Units 12/31/23 2115 12/31/23 1815 12/26/23  1043   CRP mg/dL  --  32.78*  --    LACTATE mmol/L 1.4  --   --    WBC 10*3/mm3  --  12.42* 8.94   HEMOGLOBIN g/dL  --  11.6* 11.9*   HEMATOCRIT %  --  36.9 37.8   MCV fL  --  86.6 86.5   MCHC g/dL  --  31.4* 31.5   PLATELETS 10*3/mm3  --  255 168     Results from last 7 days   Lab Units 12/31/23 1815 12/26/23  1043   SODIUM mmol/L 137 140   POTASSIUM mmol/L 3.5 3.8   CHLORIDE mmol/L 99 102   CO2 mmol/L 21.4* 27.5   BUN mg/dL 27* 15   CREATININE  mg/dL 1.50* 0.97   CALCIUM mg/dL 9.3 9.0   GLUCOSE mg/dL 245* 141*   ALBUMIN g/dL 3.4* 3.9   BILIRUBIN mg/dL 0.4 0.5   ALK PHOS U/L 157* 133*   AST (SGOT) U/L 18 11   ALT (SGPT) U/L 12 9   Estimated Creatinine Clearance: 37.1 mL/min (A) (by C-G formula based on SCr of 1.5 mg/dL (H)).    Lab Results   Component Value Date    HGBA1C 6.60 (H) 05/12/2020     Lab Results   Component Value Date    TSH 2.300 10/09/2023    FREET4 1.23 10/09/2023       Pain Management Panel          Latest Ref Rng & Units 5/30/2017   Pain Management Panel   Amphetamine, Urine Qual Negative Negative    Barbiturates Screen, Urine Negative Negative    Benzodiazepine Screen, Urine Negative Negative    Buprenorphine, Screen, Urine Negative Negative    Cocaine Screen, Urine Negative Negative    Methadone Screen , Urine Negative Negative      I have personally reviewed the above laboratory results.   ---------------------------------------------------------------------------------------------------------------------  Imaging Results (Last 7 Days)       Procedure Component Value Units Date/Time    CT Head Without Contrast [[id number]] Collected: 01/01/24 0224     Updated: 01/01/24 0229    Narrative:      EXAMINATION: CT head without contrast     CLINICAL HISTORY: Mental status change. Unknown cause.     COMPARISON: 10/9/2023     TECHNIQUE: Contiguous 3 mm thick slices were obtained from the skull  base to the vertex without contrast. Coronal and sagittal reformats were  performed.     FINDINGS:  Brain volume is normal for age. There is no acute intracranial  hemorrhage. No acute territorial infarct. No extra-axial collection. No  shift of midline structures. No acute calvarial fracture. The visualized  paranasal sinuses and the mastoid air cells are largely clear. Mildly  displaced nasal bone fractures appear similar to the study 10/9/2023.       Impression:      1. No acute intracranial process. If symptoms persist, MRI should be  considered for  further evaluation.     This report was finalized on 1/1/2024 2:27 AM by Rajat Avila MD.       CT Chest Without Contrast Diagnostic [[id number]] Collected: 12/31/23 2035     Updated: 12/31/23 2041    Narrative:      INDICATION: Cough.     COMPARISON: CT chest from 12/19/2023     TECHNIQUE: Axial CT images of the chest were obtained without IV  contrast administration. Coronal and sagittal reformations were  reviewed.     FINDINGS:  The thoracic aorta appears normal in caliber. The heart is mildly  enlarged. No pericardial or pleural effusion. No pneumothorax. Enlarged  mediastinal lymph nodes measuring up to 1.5 cm.     Spiculated nodule in the right upper lobe measuring 1.9 cm.  Atelectasis/airspace disease in the lingula and left lower lobe.  Masslike consolidation in the right lower lobe, difficult evaluation  secondary to motion artifact and lack of IV contrast. Chest port noted.     Degenerative changes in the spine. No acute osseous abnormality evident.     Visualized upper abdominal structures appear unremarkable.       Impression:      Spiculated nodule of the right upper lobe concerning for neoplasm.  Masslike consolidation in the right lower lobe could represent pneumonia  with underlying mass not excluded. Atelectasis/airspace disease in the  left lower lobe and lingula. Mediastinal adenopathy. Follow-up  recommended.        This report was finalized on 12/31/2023 8:39 PM by Alex Pallas, DO.       XR Chest 1 View [[id number]] Collected: 12/31/23 1941     Updated: 12/31/23 1944    Narrative:      INDICATION: Shortness of breath.     TECHNIQUE: Frontal radiograph of the chest.     COMPARISON: 10/9/2023.     FINDINGS:   Cardiomegaly. Small bilateral pleural effusions with underlying  atelectasis/airspace disease. Chronic appearing interstitial lung  markings. No pneumothorax. Chest port catheter tip in the previously  healed. No acute fracture.       Impression:      Small bilateral pleural effusions with  underlying atelectasis/airspace  disease.        This report was finalized on 12/31/2023 7:42 PM by Alex Pallas, DO.             I have personally reviewed the above radiology results.     Last Echocardiogram:  Results for orders placed during the hospital encounter of 06/07/21    Adult Transthoracic Echo Complete W/ Cont if Necessary Per Protocol    Interpretation Summary  · The study is technically difficult for diagnosis.  · Normal left ventricular cavity size and wall thickness noted. All left ventricular wall segments contract normally  · Left ventricular ejection fraction appears to be 61 - 65%.  · Left ventricular diastolic function is consistent with (grade I) impaired relaxation.  · The aortic valve is structurally normal with no regurgitation or stenosis present.  · The mitral valve is structurally normal with no regurgitation or significant stenosis present.  · There is no evidence of pericardial effusion.    ---------------------------------------------------------------------------------------------------------------------    Assessment & Plan      Active Hospital Problems    Diagnosis  POA    Sepsis due to pneumonia [J18.9, A41.9]  Yes       #Sepsis POA secondary to right lower lobe pneumonia, concerning for postobstructive pneumonia (HR> 90, CRP> 2, WBC> 12)  #Acute infection with coronavirus HKU1  #Known history of non-small cell lung cancer of the right upper lobe, on chemoradiation  #Mediastinal adenopathy  #Immunocompromised  #Elevated anion gap  #?  Acute UTI  -CT of the chest without contrast shows a spiculated nodule in the right upper lobe and a masslike consolidation in the right lower lobe likely representing pneumonia along with atelectasis/airspace disease in the left lower lobe and lingula; mediastinal adenopathy  -UA is also grossly abnormal with trace ketones, trace leukocytes, 3-5 WBC, 1+ bacteria, however, dirty sample with 3-12 squamous epithelial cells  -Patient received IV  Rocephin, levofloxacin, and vancomycin in the emergency department; we will change antibiotic therapy to pharmacy dose Zosyn and doxycycline  -Obtain MRSA swab, respiratory culture, urine Legionella, and strep pneumo; respiratory panel positive for coronavirus HKU1   -Urine culture and blood cultures pending  -Anion gap is elevated at 1.5; likely secondary to DONNIE; ABG without systemic acidosis  -Gentle maintenance fluids ordered; repeat a.m. CMP and monitor for anion gap closure  -Repeat a.m. CBC and CRP  -Atrovent treatments ordered  -Incentive spirometer given, encourage use  -Monitor vitals closely    #New left bundle branch block  #NSTEMI, type I versus type II  #Essential hypertension  #Hyperlipidemia  -EKG obtained in the ER shows what appears to be a new LBBB; reviewed EKG with Dr. Interiano; LBBB not present on EKGs from 9/5/23 or 10/9/23  -Repeat EKG  - HS Trop T 21; appears to be at baseline when compared to labs from 10/23  -Suspect type II NSTEMI in setting of DONNEI and demand ischemia from a.fib with RVR   -Pt denies chest pain  -Cardiology has been consulted, further input/assistance is much appreciated     #DONNIE on CKD stage IIIa  -Baseline Cr appears to be around 0.8-1.0; Cr on admission 1.5  -Suspect pre-renal azotemia and/or early ATN from sepsis   -Start gentle mNS @ 50 mL/h  -obtain urine electrolytes  -Repeat AM CMP and monitor renal fxn closely   -Review home medications once reconciled per pharmacy; avoid nephrotoxic agents as much as possible     #Atrial fibrillation with RVR  #Chronic anticoagulation with Eliquis  -Currently in atrial fibrillation, HR around  bpm; will continue to monitor, if HR stays ~ or >110 bpm sustained will give rate lowering agent  -Continue eliquis for stroke prophylaxis   -Review home medications once available   -Start gentle mNS as noted above   -Monitor closely on telemetry     #AMS, ? Metabolic encephalopathy   -CT of the head w/o contrast shows now acute  findings   -Currently A&O x 4   -Obtain neurochecks q4 hours   -Continue with treatment as outlined above  -UDS ordered     #Type 2 diabetes mellitus  -Obtain hemoglobin A1c  -SSI ordered; obtain POC glucose 4x daily  before meals and at bedtime; titrate insulin therapy as necessary; hypoglycemia protocol in place  -Review home regimen once reconciled per pharmacy     #Chronic diastolic CHF  -Appears euvolemic on exam   -TTE from 6/7/21 with EF 61-65%  -Monitor for signs of volume overload with daily weights, strict I's and O's       F/E/N: NS 50 mL/h. Replace electrolytes as necessary. Cardiac, consistent carb diet.   ---------------------------------------------------  DVT Prophylaxis: Eliquis  GI Prophylaxis: Protonix  Activity: Up with assistance, fall precautions; turn every 2 hours    The patient is considered to be a high risk patient due to: Sepsis POA secondary to right lower lobe pneumonia, acute UTI, DONNIE, atrial fibrillation with RVR    INPATIENT status due to the need for care which can only be reasonably provided in an hospital setting such as aggressive/expedited ancillary services and/or consultation services, the necessity for IV medications, close physician monitoring and/or the possible need for procedures.  In such, I feel patient’s risk for adverse outcomes and need for care warrant INPATIENT evaluation and predict the patient’s care encounter to likely last beyond 2 midnights.    Code Status: FULL CODE       I have discussed the patient's assessment and plan with patient, nursing staff, and attending physician      Kae Michael PA-C  Hospitalist Service -- [facility]       01/01/24  03:35 EST    Attending Physician: Izabel Interiano DO       Electronically signed by Izabel Interiano DO at 01/01/24 0637       Operative/Procedure Notes (most recent note)    No notes of this type exist for this encounter.          Physician Progress Notes (most recent note)         Tara Nino MD at 24 1203              [facility] HospitalIST PROGRESS NOTE     Patient Identification:  Name:  Mayra Hays  Age:  78 y.o.  Sex:  female  :  1945  MRN:  [id number]  Visit Number:  [id number]  Primary Care Provider:  Niki Antony APRN    Length of stay:  2    Subjective: Patient seen and examined, she is no longer dyspneic, awake and alert she reports she is weak, persistently hypoglycemic despite adjusting her dose of sliding scale.  Heart rate is well-controlled A-fib.    Chief Complaint: Short of breath  ----------------------------------------------------------------------------------------------------------------------  Current Hospital Meds:  apixaban, 5 mg, Oral, Q12H  arformoterol, 15 mcg, Nebulization, BID - RT  atorvastatin, 20 mg, Oral, Daily  budesonide, 0.5 mg, Nebulization, BID - RT  doxycycline, 100 mg, Intravenous, Q12H  hydrOXYzine, 25 mg, Oral, Nightly  influenza vaccine, 0.7 mL, Intramuscular, Once  insulin lispro, 2-9 Units, Subcutaneous, 4x Daily AC & at Bedtime  ipratropium, 0.5 mg, Nebulization, 4x Daily - RT  mupirocin, 1 application , Each Nare, BID  pantoprazole, 40 mg, Oral, Q AM  piperacillin-tazobactam, 3.375 g, Intravenous, Q8H  predniSONE, 30 mg, Oral, Daily With Breakfast  senna-docusate sodium, 2 tablet, Oral, BID  sodium chloride, 10 mL, Intravenous, Q12H         ----------------------------------------------------------------------------------------------------------------------  Vital Signs:  Temp:  [97.4 °F (36.3 °C)-97.9 °F (36.6 °C)] 97.8 °F (36.6 °C)  Heart Rate:  [] 79  Resp:  [18-22] 20  BP: (138-146)/(74-92) 143/87       Tele: Atrial fibrillation 84 bpm      24  0500 24  0500 24  0500   Weight: 98.3 kg (216 lb 11.2 oz) (Admit weight less than 24 hrs.) 99 kg (218 lb 3.2 oz) 100 kg (220 lb 11.2 oz)     Body mass index is 35.62 kg/m².    Intake/Output Summary (Last 24 hours) at 1/3/2024  1204  Last data filed at 1/3/2024 0900  Gross per 24 hour   Intake 1400 ml   Output --   Net 1400 ml     Diet: Cardiac Diets; Healthy Heart (2-3 Na+); Texture: Regular Texture (IDDSI 7); Fluid Consistency: Thin (IDDSI 0)  ----------------------------------------------------------------------------------------------------------------------  Physical exam:  General: Comfortable,awake, alert, oriented to self, place, and time, well-developed and well-nourished.  No respiratory distress.    Skin:  Skin is warm and dry. No rash noted. No pallor.    HENT:  Head:  Normocephalic and atraumatic.  Mouth:  Moist mucous membranes.    Eyes:  Conjunctivae and EOM are normal.  Pupils are equal, round, and reactive to light.  No scleral icterus.    Neck:  Neck supple.  No JVD present.  No bruit  Pulmonary/Chest:  No respiratory distress, no wheezes, no crackles, with normal breath sounds and good air movement.  Cardiovascular:  Normal rate, irregular irregular rhythm, no murmur.  Abdominal:  Soft.  Bowel sounds are normal.  No distension and no tenderness.   Extremities:  No edema, no tenderness, and no deformity.  No red or swollen joints anywhere.  Strong pulses in all 4 extremities with no clubbing, no cyanosis, no edema.  Neurological:  Motor strength equal no obvious deficit, sensory grossly intact.   No cranial nerve deficit.  No tongue deviation.  No facial droop.  No slurred speech.    Genitourinary: No Shetty catheter  Back:  ----------------------------------------------------------------------------------------------------------------------  ----------------------------------------------------------------------------------------------------------------------  Results from last 7 days   Lab Units 01/01/24  0602 01/01/24 0401 12/31/23  1815   HSTROP T ng/L 20* 20* 21*     Results from last 7 days   Lab Units 01/03/24  0132 01/02/24  0059 01/01/24 0401 12/31/23  2115 12/31/23  1815   CRP mg/dL 9.90*  --  26.30*  --  32.78*  "  LACTATE mmol/L  --   --   --  1.4  --    WBC 10*3/mm3 12.31* 6.70 10.42  --  12.42*   HEMOGLOBIN g/dL 10.5* 10.8* 10.7*  --  11.6*   HEMATOCRIT % 35.1 35.2 35.3  --  36.9   MCV fL 91.2 88.2 89.4  --  86.6   MCHC g/dL 29.9* 30.7* 30.3*  --  31.4*   PLATELETS 10*3/mm3 291 260 211  --  255     Results from last 7 days   Lab Units 12/31/23  1741   PH, ARTERIAL pH units 7.424   PO2 ART mm Hg 64.2*   PCO2, ARTERIAL mm Hg 34.0*   HCO3 ART mmol/L 22.2     Results from last 7 days   Lab Units 01/03/24  0132 01/02/24  0059 01/01/24  0401 12/31/23  1815   SODIUM mmol/L 139 140 139 137   POTASSIUM mmol/L 4.6 4.2 3.7 3.5   MAGNESIUM mg/dL 2.2  --   --   --    CHLORIDE mmol/L 106 108* 103 99   CO2 mmol/L 23.4 21.7* 18.1* 21.4*   BUN mg/dL 33* 22 21 27*   CREATININE mg/dL 1.33* 1.11* 1.05* 1.50*   CALCIUM mg/dL 9.6 9.5 8.4* 9.3   GLUCOSE mg/dL 291* 308* 138* 245*   ALBUMIN g/dL  --   --  3.1* 3.4*   BILIRUBIN mg/dL  --   --  0.3 0.4   ALK PHOS U/L  --   --  115 157*   AST (SGOT) U/L  --   --  14 18   ALT (SGPT) U/L  --   --  9 12   Estimated Creatinine Clearance: 42.3 mL/min (A) (by C-G formula based on SCr of 1.33 mg/dL (H)).    No results found for: \"AMMONIA\"      Blood Culture   Date Value Ref Range Status   12/31/2023 No growth at 2 days  Preliminary   12/31/2023 No growth at 2 days  Preliminary     Urine Culture   Date Value Ref Range Status   12/31/2023 <10,000 CFU/mL Normal Urogenital Yareli  Final     No results found for: \"WOUNDCX\"  No results found for: \"STOOLCX\"    I have personally looked at the labs and they are summarized above.  ----------------------------------------------------------------------------------------------------------------------  Imaging Results (Last 24 Hours)       ** No results found for the last 24 hours. **          ----------------------------------------------------------------------------------------------------------------------  Assessment and Plan:  -Acute on chronic hypoxic respiratory " failure  -Acute upper respiratory tract infection [place] HKU1  -History of lung cancer on chemotherapy  -Diabetes type II with hyperglycemia likely secondary to Solu-Medrol  -Immunocompromise state  -COPD with exacerbation  -Bilateral basal pneumonia present on admission  -History of paroxysmal atrial fibrillation with RVR now controlled  -Former tobacco use    Patient is stable now with O2 saturation of 96 to 97% on 2 L, heart rate is now controlled, will transition to prednisone oral for 5 days, continue neb treatment, CRP is trending down will DC Zosyn changed to Rocephin and monitor clinically.  Ulcer so far is negative.  Increase sliding scale for Accu-Chek dosing, review of A1c is poorly controlled until this admission likely from Solu-Medrol.    Disposition pending home      Tara Nino MD  01/03/24  12:04 EST    Electronically signed by Tara Nino MD at 01/03/24 1214          Consult Notes (most recent note)        Jessica Johnson APRN at 01/02/24 1120        Consult Orders    1. Inpatient Palliative Care MD Consult [[id number]] ordered by Kae Michael PA-C at 01/01/24 0404                 Palliative Care Initial Consult     Attending Physician: Tara Nino MD  Referring Provider: Tara Nino MD    assistance with advance directives, assistance with clarification of goals of care, and psychosocial support  Code Status:   Code Status and Medical Interventions:   Ordered at: 01/01/24 0109     Code Status (Patient has no pulse and is not breathing):    CPR (Attempt to Resuscitate)     Medical Interventions (Patient has pulse or is breathing):    Full Support      Advanced Directives: Advance Directive Status: Patient has advance directive, copy in chart   Healthcare surrogate: Son HERMINIO Cummings and daughter HERMINIO Hart Lay  Goals of Care: I was able to have a conversation with Mayra and her son and HERMINIO Leone to discuss Goals of care. Mayra confirms that she would  want to be resuscitated and to be placed on the ventilator. I discussed with her and son if they discussed long term goals and she and Parth state that she would not want to be on vent or to have a trach and peg.    HPI:  Mayra Hays is a 78 y.o. female admitted on 12/31/23 due to dysuria and shortness of breath. Mayra has a medical history of chronic diastolic CHF, non-small cell cancer of the right lung, paroxysmal atrial fibrillation, chronic anticoagulation with Eliquis, hyperlipidemia, essential hypertension, COPD, type 2 diabetes mellitus, GERD. Mayra had been experiencing dyspnea upon exertion for several days prior to admission, with productive cough.        ROS: Negative except as above in HPI.     Past Medical History:   Diagnosis Date    Arthritis     CHF (congestive heart failure)     Chronic anticoagulation     Eliquis    Chronic kidney disease     stage II/IIIa    Collapsed lung     COPD (chronic obstructive pulmonary disease)     Diabetes mellitus     TYPE 2    Elevated cholesterol     GERD (gastroesophageal reflux disease)     Hyperlipidemia     Hypertension     Non-small cell lung cancer RUL     Sleep apnea     non compliant with CPAP    Stroke 2019    Wears eyeglasses      Past Surgical History:   Procedure Laterality Date    BRONCHOSCOPY Bilateral 02/27/2023    Procedure: BRONCHOSCOPY WITH ENDOBRONCHIAL ULTRASOUND;  Surgeon: Abdulkadir Peter MD;  Location: [facility] OR;  Service: Pulmonary;  Laterality: Bilateral;    BRONCHOSCOPY WITH ION ROBOTIC ASSIST N/A 9/6/2023    Procedure: BRONCHOSCOPY WITH ION ROBOT AND EBUS;  Surgeon: John Clemens MD;  Location:  [facility];  Service: Robotics - Pulmonary;  Laterality: N/A;  Ion cath #6 - 0032,  #6 - 0030, cath guide # 0077. EBUS scope removed with balloon intact.    CARDIAC CATHETERIZATION N/A 08/22/2017    Procedure: Left Heart Cath;  Surgeon: Jatinder Matias MD;  [facility];  Service:     CARDIAC  CATHETERIZATION N/A 2021    Procedure: Left Heart Cath;  Surgeon: Tolu Steinberg MD;  [facility]:  [facility];  Service: Cardiology;  Laterality: N/A;    COLONOSCOPY      ENDOSCOPY      GALLBLADDER SURGERY      PORTACATH PLACEMENT N/A 2023    Procedure: INSERTION OF PORTACATH;  Surgeon: Petr Velásquez MD;  Location:  [facility];  Service: General;  Laterality: N/A;    VENTRAL HERNIA REPAIR N/A 2020    Procedure: VENTRAL HERNIA REPAIR LAPAROSCOPIC WITH DAVINCI ROBOT;  Surgeon: Petr Velásquez MD;  Location:  [facility];  Service: DaVinci;  Laterality: N/A;     Social History     Socioeconomic History    Marital status: Single    Number of children: 6   Tobacco Use    Smoking status: Former     Packs/day: 3     Types: Cigarettes     Start date: 4/3/1998     Quit date: 2015     Years since quittin.0     Passive exposure: Past    Smokeless tobacco: Never   Vaping Use    Vaping Use: Never used   Substance and Sexual Activity    Alcohol use: No    Drug use: No    Sexual activity: Never     Family History   Problem Relation Age of Onset    Heart disease Mother         Rhianna sophia    Heart disease Father     Heart attack Father     Heart failure Father        Allergies   Allergen Reactions    Paclitaxel Anaphylaxis       Current Facility-Administered Medications   Medication Dose Route Frequency Provider Last Rate Last Admin    acetaminophen (TYLENOL) tablet 650 mg  650 mg Oral Q6H PRN Kae Michael PA-C   650 mg at 24 0505    apixaban (ELIQUIS) tablet 5 mg  5 mg Oral Q12H O'Mary Mac PA   5 mg at 24 0813    arformoterol (BROVANA) nebulizer solution 15 mcg  15 mcg Nebulization BID - RT Tara Nino MD   15 mcg at 24 0655    atorvastatin (LIPITOR) tablet 20 mg  20 mg Oral Daily O'Mary Mac PA   20 mg at 24 0813    sennosides-docusate (PERICOLACE) 8.6-50 MG per tablet 2 tablet  2 tablet Oral BID Izabel Interiano DO    2 tablet at 01/02/24 0813    And    polyethylene glycol (MIRALAX) packet 17 g  17 g Oral Daily PRN Izabel Interiano DO        And    bisacodyl (DULCOLAX) EC tablet 5 mg  5 mg Oral Daily PRN Izabel Interiano DO        And    bisacodyl (DULCOLAX) suppository 10 mg  10 mg Rectal Daily PRN Izabel Interiano DO        budesonide (PULMICORT) nebulizer solution 0.5 mg  0.5 mg Nebulization BID - RT Tara Nino MD   0.5 mg at 01/02/24 0655    calcium carbonate (TUMS) chewable tablet 500 mg (200 mg elemental)  2 tablet Oral TID PRN Kae Michael PA-C        dextrose (D50W) (25 g/50 mL) IV injection 25 g  25 g Intravenous Q15 Min PRN Izabel Interiano DO        dextrose (D50W) (25 g/50 mL) IV injection 25 g  25 g Intravenous Q15 Min PRN Tara Nino MD        dextrose (GLUTOSE) oral gel 15 g  15 g Oral Q15 Min PRN Izabel Interiano DO        dextrose (GLUTOSE) oral gel 15 g  15 g Oral Q15 Min PRN Tara Nino MD        doxycycline (VIBRAMYCIN) 100 mg in sodium chloride 0.9 % 100 mL IVPB-VTB  100 mg Intravenous Q12H Izabel Interiano DO   100 mg at 01/02/24 0409    glucagon HCl (Diagnostic) injection 1 mg  1 mg Intramuscular Q15 Min PRN Izabel Interiano DO        guaiFENesin-dextromethorphan (ROBITUSSIN DM) 100-10 MG/5ML syrup 5 mL  5 mL Oral Q4H PRN Kae Michael PA-C        hydrOXYzine (ATARAX) tablet 25 mg  25 mg Oral TID PRN Kae Michael PA-C        hydrOXYzine (ATARAX) tablet 25 mg  25 mg Oral Nightly MARCIO'Mary Mac PA   25 mg at 01/01/24 2101    Influenza Vac High-Dose Quad (FLUZONE HIGH DOSE) injection 0.7 mL  0.7 mL Intramuscular Once Kae Michael PA-C        Insulin Lispro (humaLOG) injection 2-9 Units  2-9 Units Subcutaneous 4x Daily AC & at Bedtime OculamTara MD   8 Units at 01/02/24 1120    ipratropium (ATROVENT) nebulizer solution 0.5 mg  0.5 mg Nebulization 4x Daily - RT Kae Michael PA-C   0.5 mg at  "01/02/24 0656    ipratropium-albuterol (DUO-NEB) nebulizer solution 3 mL  3 mL Nebulization Q6H PRN Izabel Interiano DO        melatonin tablet 10 mg  10 mg Oral Nightly PRN Kae Michael PA-C   10 mg at 01/01/24 2100    methylPREDNISolone sodium succinate (SOLU-Medrol) injection 40 mg  40 mg Intravenous Q12H Tara Nino MD   40 mg at 01/02/24 0306    nitroglycerin (NITROSTAT) SL tablet 0.4 mg  0.4 mg Sublingual Q5 Min PRN Izabel Interiano DO        pantoprazole (PROTONIX) EC tablet 40 mg  40 mg Oral Q AM Kae Michael PA-C   40 mg at 01/02/24 0505    piperacillin-tazobactam (ZOSYN) IVPB 3.375 g in 100 mL NS VTB  3.375 g Intravenous Q8H Izabel Interiano DO   3.375 g at 01/02/24 1120    sodium chloride 0.9 % flush 10 mL  10 mL Intravenous PRN Izabel Interiano DO        sodium chloride 0.9 % flush 10 mL  10 mL Intravenous Q12H Izabel Interiano DO   10 mL at 01/02/24 0813    sodium chloride 0.9 % flush 10 mL  10 mL Intravenous PRN Izabel Interiano DO        sodium chloride 0.9 % infusion 40 mL  40 mL Intravenous PRN Izabel Interiano DO              acetaminophen    senna-docusate sodium **AND** polyethylene glycol **AND** bisacodyl **AND** bisacodyl    calcium carbonate    dextrose    dextrose    dextrose    dextrose    glucagon (human recombinant)    guaiFENesin-dextromethorphan    hydrOXYzine    ipratropium-albuterol    melatonin    nitroglycerin    sodium chloride    sodium chloride    sodium chloride    Current medication reviewed for route, type, dose and frequency and are current per MAR.    Palliative Performance Scale Score:     /65 (BP Location: Left arm, Patient Position: Lying)   Pulse 104   Temp 97.4 °F (36.3 °C) (Oral)   Resp 20   Ht 167.6 cm (66\")   Wt 99 kg (218 lb 3.2 oz)   SpO2 94%   BMI 35.22 kg/m²     Intake/Output Summary (Last 24 hours) at 1/2/2024 1615  Last data filed at 1/2/2024 0345  Gross per 24 hour   Intake 460 ml "   Output 400 ml   Net 60 ml       PE:  General Appearance:    Chronically ill appearing, alert, cooperative, NAD   HEENT:    NC/AT, without obvious abnormality, EOMI, anicteric    Neck:   supple, trachea midline, no JVD   Lungs:     Unlabored respirations, no wheezing rhonchi or rales noted on RA, diminished in BL bases.    Heart:    Regular rate, irregular rhythm, normal S1 and S2, no M/R/G   Abdomen:     Soft, NT, ND, NABS    Extremities:   Moves all extremities, no edema   Pulses:   Pulses palpable and equal bilaterally   Skin:   Warm, dry   Neurologic:   A/Ox3, cooperative   Psych:   Calm, appropriate       Labs:   Results from last 7 days   Lab Units 01/02/24  0059   WBC 10*3/mm3 6.70   HEMOGLOBIN g/dL 10.8*   HEMATOCRIT % 35.2   PLATELETS 10*3/mm3 260     Results from last 7 days   Lab Units 01/02/24  0059   SODIUM mmol/L 140   POTASSIUM mmol/L 4.2   CHLORIDE mmol/L 108*   CO2 mmol/L 21.7*   BUN mg/dL 22   CREATININE mg/dL 1.11*   GLUCOSE mg/dL 308*   CALCIUM mg/dL 9.5     Results from last 7 days   Lab Units 01/02/24  0059 01/01/24  0401   SODIUM mmol/L 140 139   POTASSIUM mmol/L 4.2 3.7   CHLORIDE mmol/L 108* 103   CO2 mmol/L 21.7* 18.1*   BUN mg/dL 22 21   CREATININE mg/dL 1.11* 1.05*   CALCIUM mg/dL 9.5 8.4*   BILIRUBIN mg/dL  --  0.3   ALK PHOS U/L  --  115   ALT (SGPT) U/L  --  9   AST (SGOT) U/L  --  14   GLUCOSE mg/dL 308* 138*     Imaging Results (Last 72 Hours)       Procedure Component Value Units Date/Time    CT Head Without Contrast [[id number]] Collected: 01/01/24 0224     Updated: 01/01/24 0229    Narrative:      EXAMINATION: CT head without contrast     CLINICAL HISTORY: Mental status change. Unknown cause.     COMPARISON: 10/9/2023     TECHNIQUE: Contiguous 3 mm thick slices were obtained from the skull  base to the vertex without contrast. Coronal and sagittal reformats were  performed.     FINDINGS:  Brain volume is normal for age. There is no acute intracranial  hemorrhage. No acute  territorial infarct. No extra-axial collection. No  shift of midline structures. No acute calvarial fracture. The visualized  paranasal sinuses and the mastoid air cells are largely clear. Mildly  displaced nasal bone fractures appear similar to the study 10/9/2023.       Impression:      1. No acute intracranial process. If symptoms persist, MRI should be  considered for further evaluation.     This report was finalized on 1/1/2024 2:27 AM by Rajat Avila MD.       CT Chest Without Contrast Diagnostic [[id number]] Collected: 12/31/23 2035     Updated: 12/31/23 2041    Narrative:      INDICATION: Cough.     COMPARISON: CT chest from 12/19/2023     TECHNIQUE: Axial CT images of the chest were obtained without IV  contrast administration. Coronal and sagittal reformations were  reviewed.     FINDINGS:  The thoracic aorta appears normal in caliber. The heart is mildly  enlarged. No pericardial or pleural effusion. No pneumothorax. Enlarged  mediastinal lymph nodes measuring up to 1.5 cm.     Spiculated nodule in the right upper lobe measuring 1.9 cm.  Atelectasis/airspace disease in the lingula and left lower lobe.  Masslike consolidation in the right lower lobe, difficult evaluation  secondary to motion artifact and lack of IV contrast. Chest port noted.     Degenerative changes in the spine. No acute osseous abnormality evident.     Visualized upper abdominal structures appear unremarkable.       Impression:      Spiculated nodule of the right upper lobe concerning for neoplasm.  Masslike consolidation in the right lower lobe could represent pneumonia  with underlying mass not excluded. Atelectasis/airspace disease in the  left lower lobe and lingula. Mediastinal adenopathy. Follow-up  recommended.        This report was finalized on 12/31/2023 8:39 PM by Alex Pallas, DO.       XR Chest 1 View [[id number]] Collected: 12/31/23 1941     Updated: 12/31/23 1944    Narrative:      INDICATION: Shortness of breath.      TECHNIQUE: Frontal radiograph of the chest.     COMPARISON: 10/9/2023.     FINDINGS:   Cardiomegaly. Small bilateral pleural effusions with underlying  atelectasis/airspace disease. Chronic appearing interstitial lung  markings. No pneumothorax. Chest port catheter tip in the previously  healed. No acute fracture.       Impression:      Small bilateral pleural effusions with underlying atelectasis/airspace  disease.        This report was finalized on 12/31/2023 7:42 PM by Alex Pallas, DO.               Diagnostics: Reviewed    A: Mayra Hays is a 78 y.o. female admitted on 12/31/23 due to dysuria and shortness of breath. Mayra has a medical history of chronic diastolic CHF, non-small cell cancer of the right lung, paroxysmal atrial fibrillation, chronic anticoagulation with Eliquis, hyperlipidemia, essential hypertension, COPD, type 2 diabetes mellitus, GERD. Mayra had been experiencing dyspnea upon exertion for several days prior to admission, with productive cough.         P:   Palliative consulted for GOC/ACP, pain management and support for pt and family. I was able to have a conversation with Mayra and her son and HERMINIO Leone to discuss Goals of care. Mayra confirms that she would want to be resuscitated and to be placed on the ventilator. I discussed with her and son if they discussed long term goals and she and Parth state that she would not want to be on vent or to have a trach and peg. Mayra was only c/o of back pain, which she states is her normal and only take Tylenol for it and states that it is effective. I discussed Mayra with Dr Nino.      We appreciate the consult and the opportunity to participate in Mayra Hays's care. We will continue to follow along. Please do not hesitate to contact us regarding further symptom management or goals of care needs, including after hours or on weekends via our on call provider at [phone].     Time: 55 minutes spent reviewing medical and  medication records, assessing and examining patient, discussing with family, answering questions, providing some guidance about a plan and documentation of care, and coordinating care with other healthcare members, with > 50% time spent face to face.     ERASTO West    1/2/2024      Electronically signed by Jessica Johnson APRN at 01/02/24 1631          Physical Therapy Notes (most recent note)        Bonnie Guillen, PT at 01/02/24 1306  Version 1 of 1         Goal Outcome Evaluation:              Outcome Evaluation: Pt seen for therapy evaluation this date, pleasant and cooperative with therapy. Pt may benefit from therapeutic intervention during hospital stay to improve mobility and LE strength for functional mobility and independence.      Anticipated Discharge Disposition (PT): home with supervision, home with assist    Electronically signed by Bonnie Guillen PT at 01/02/24 1306       Occupational Therapy Notes (most recent note)    No notes exist for this encounter.       Discharge Summary    No notes of this type exist for this encounter.       Discharge Order (From admission, onward)       Start     Ordered    01/04/24 1021  Discharge patient  Once        Expected Discharge Date: 01/04/24   Discharge Disposition: Home-Health Care [facility] [place]   Physician of Record for Attribution - Please select from Treatment Team: HARRIETT GUERRIER [[id number]]   Review needed by CMO to determine Physician of Record: No      Question Answer Comment   Physician of Record for Attribution - Please select from Treatment Team HARRIETT GUERRIER    Review needed by CMO to determine Physician of Record No        01/04/24 1026

## 2024-01-04 NOTE — OUTREACH NOTE
Prep Survey      Flowsheet Row Responses   [facility] patient discharged from? [facility]   Is LACE score < 7 ? No   Eligibility Readm Mgmt   Discharge diagnosis Sepsis due to pneumonia   Does the patient have one of the following disease processes/diagnoses(primary or secondary)? Sepsis   Does the patient have Home health ordered? Yes   What is the Home health agency?  [facility]   Is there a DME ordered? No   Prep survey completed? Yes            Alma ADHIKARI - Registered Nurse

## 2024-01-04 NOTE — DISCHARGE PLACEMENT REQUEST
"Charles Archer (78 y.o. Female)       Date of Birth   1945    Social Security Number       Address   [address] [address]    Home Phone   [phone]    MRN   [id number]       [place]   None    Marital Status   Single                            Admission Date   12/31/23    Admission Type   Emergency    Admitting Provider   Izabel Interiano DO    Attending Provider   Tara Nino MD    Department, Room/Bed   [address], 3338/1P       Discharge Date       Discharge Disposition   Home-Health Care [facility] [place]    Discharge Destination                                 Attending Provider: Tara Nino MD    Allergies: Paclitaxel    Isolation: Contact   Infection: Bed Bugs (10/13/23)   Code Status: CPR    Ht: 167.6 cm (66\")   Wt: 100 kg (220 lb 14.4 oz)    Admission Cmt: None   Principal Problem: None                  Active Insurance as of 12/31/2023       Primary Coverage       Payor Plan Insurance Group Employer/Plan Group    ANTHEM MEDICARE REPLACEMENT ANTHEM MEDICARE ADVANTAGE [id number]       Payor Plan Address Payor Plan Phone Number Payor Plan Fax Number Effective Dates    [address] [phone]  2/1/2023 - None Entered    [facility] [address]         Subscriber Name Subscriber Birth Date Member ID       CHARLES ARCHER 1945 [id number]               Secondary Coverage       Payor Plan Insurance Group Employer/Plan Group    [place]        Payor Plan Address Payor Plan Phone Number Payor Plan Fax Number Effective Dates    [address]   1/1/2014 - None Entered    [facility] [address]         Subscriber Name Subscriber Birth Date Member ID       CHARLES ARCHER 1945 [id number]                     Emergency Contacts        (Rel.) Home Phone Work Phone Mobile Phone    Hailey Quinonez (POA) (Power of ) [phone] -- --    PHIL PRASAD (alternative POA) (Power of ) -- -- " [phone]    CHELO PRASAD (Daughter) [phone] -- --                 Discharge Summary        Tara Nino MD at 24 1129              [facility] HOSPITALIST MEDICINE DISCHARGE SUMMARY    Patient Identification:  Name:  Mayra Hays  Age:  78 y.o.  Sex:  female  :  1945  MRN:  [id number]  Visit Number:  [id number]    Date of Admission: 2023  Date of Discharge: 2024  DISCHARGE DISPOSITION   Stable  PCP: Niki Antony APRN    DISCHARGE DIAGNOSIS : Acute respiratory tract infection coronavirus HKU1, COPD exacerbation, diabetes type 2 with hyperglycemia, history of non-small cell CA right upper lobe on chemoradiation, bibasal pneumonia, troponin elevation flat, chronic.  History of paroxysmal atrial fibrillation with RVR present on admission resolved, chronic anticoagulation for stroke prophylaxis, acute metabolic encephalopathy from pneumonia and A-fib with RVR in respiratory infection resolved, history of chronic diastolic heart failure compensated.  Acute kidney injury on CKD stage II-IIIa, masslike consolidation right lower lobe pneumonia versus mass, to be followed by oncology.  Mediastinal lymphadenopathy.  Acute on chronic hypoxic respiratory failure resolved      HOSPITAL COURSE  Patient is a 78 y.o. female presented to [facility] complaining of increasing shortness of breath coughing, mildly confused.  On arrival patient has A-fib with RVR, she does have history of atrial fibrillation, she was temporary placed on Cardizem drip.  Troponin was elevated but reviewing chart he has been chronically elevated, delta is negative, patient denies chest discomfort, she tested positive for coronavirus HKU1, she was placed on isolation, short course of systemic corticosteroids, diabetes was addressed with Accu-Chek and sliding scale, she was persistently hyperglycemic hence she was started on basal insulin her blood pressure was slightly elevated, she  was started on Lopressor and so far is tolerated well, atrial fibrillation is now well-controlled and the majority of time is in sinus rhythm.  Initially patient has been noncompliant with her oxygen, family stated she was prescribed with oxygen at home which she never uses.  After counseling the patient she has been more compliant.  For more than 2 days she has been stable, O2 saturation has been more than 95 on 2 L nasal cannula, heart rate is well-controlled, cultures were negative, CT of the brain is negative.  Plan is to discharge her home today, due to changes on her medication including insulin, she will have home health care services for supervision, PT OT and home safety evaluation.appointment with primary care provider next week for blood pressure, BMP and Accu-Chek diary review.  With regards to the masslike consolidation in the right lung, this is concerning considering the patient has history of lung cancer, she has an appointment with oncology for review.      VITAL SIGNS:      01/02/24  0500 01/03/24  0500 01/04/24  0300   Weight: 99 kg (218 lb 3.2 oz) 100 kg (220 lb 11.2 oz) 100 kg (220 lb 14.4 oz)     Body mass index is 35.65 kg/m².  Vitals:    01/04/24 1000   BP: 142/90   Pulse: 100   Resp: 18   Temp: 98.1 °F (36.7 °C)   SpO2: 93%     PHYSICAL EXAM:  General: Comfortable,awake, alert, oriented to self, place, and time, chronically ill-appearing,  No respiratory distress.    Skin:  Skin is warm and dry. No rash noted. No pallor.    HENT:  Head:  Normocephalic and atraumatic.  Mouth:  Moist mucous membranes.    Eyes:  Conjunctivae and EOM are normal.  Pupils are equal, round, and reactive to light.  No scleral icterus.    Neck:  Neck supple.  No JVD present.    Pulmonary/Chest:  No respiratory distress, no wheezes, no crackles,  Rare wheezing left lung field basal area expiratory good air movement  Cardiovascular:  Normal rate, regular rhythm and normal heart sounds with no murmur.  Abdominal:  Soft.   Bowel sounds are normal.  No distension and no tenderness.   Extremities:  No edema, no tenderness, and no deformity.  No red or swollen joints anywhere.  Strong pulses in all 4 extremities with no clubbing, no cyanosis, no edema.  Neurological:  Motor strength equal no obvious deficit, sensory grossly intact.   No cranial nerve deficit.  No tongue deviation.  No facial droop.  No slurred speech.    Genitourinary: No Shetty catheter  Back:  ----------    DISCHARGE MEDICATIONS:     Discharge Medications        New Medications        Instructions Start Date   cefdinir 300 MG capsule  Commonly known as: OMNICEF   300 mg, Oral, 2 Times Daily      doxycycline 100 MG capsule  Commonly known as: VIBRAMYCIN   100 mg, Oral, 2 Times Daily      insulin glargine 100 UNIT/ML injection  Commonly known as: LANJONATHAN SEMGLEE   15 Units, Subcutaneous, Daily   Start Date: January 5, 2024     metoprolol tartrate 25 MG tablet  Commonly known as: LOPRESSOR   12.5 mg, Oral, Every 12 Hours Scheduled      predniSONE 10 MG tablet  Commonly known as: DELTASONE   30 mg, Oral, Daily With Breakfast   Start Date: January 5, 2024            Continue These Medications        Instructions Start Date   albuterol sulfate  (90 Base) MCG/ACT inhaler  Commonly known as: PROVENTIL HFA;VENTOLIN HFA;PROAIR HFA   2 puffs, Inhalation, Every 4 Hours PRN      apixaban 5 MG tablet tablet  Commonly known as: ELIQUIS   5 mg, Oral, Every 12 Hours Scheduled      atorvastatin 20 MG tablet  Commonly known as: LIPITOR   20 mg, Oral, Daily      Breztri Aerosphere 160-9-4.8 MCG/ACT aerosol inhaler  Generic drug: Budeson-Glycopyrrol-Formoterol   2 puffs, Inhalation, 2 Times Daily      hydrOXYzine pamoate 25 MG capsule  Commonly known as: VISTARIL   25 mg, Oral, Nightly      lisinopril 5 MG tablet  Commonly known as: PRINIVIL,ZESTRIL   5 mg, Oral, Daily      metFORMIN 500 MG tablet  Commonly known as: GLUCOPHAGE   500 mg, Oral, 2 Times Daily PRN      pantoprazole 40  MG EC tablet  Commonly known as: PROTONIX   40 mg, Oral, Daily      spironolactone 25 MG tablet  Commonly known as: ALDACTONE   25 mg, Oral, Daily             Stop These Medications      esomeprazole 20 MG capsule  Commonly known as: nexIUM     trimethoprim 100 MG tablet  Commonly known as: TRIMPEX              Diet Instructions    Cardiac         Your Scheduled Appointments      Jan 04, 2024  4:00 PM  FOLLOW UP with Shawna Bond MD  [facility] HEMATOLOGY & ONCOLOGY ([place]) [address]  [facility] [address] [phone]   Bring ID and  Insurance cards.  New patients are to arrive 30 minutes prior to the appointement.  If you received paperwork in the mail, fill it out and bring it with them.  All other appt's need to be here 15 minutes early.          Car 15, 2024  1:15 PM  Follow Up with Sabiha Valadez PA-C  [facility] PULMONARY & CRITICAL CARE MEDICINE ([place]) [address]  [facility] [address] [phone]   Established: Please bring outside images or reports.         Jan 16, 2024  1:00 PM  Follow Up with ERASTO Rose  [facility] CARDIOTHORACIC SURGERY ([place]) [address]  [address]  [facility] [id number] [phone]   -Bring photo ID, insurance card, and list of medications to appointment  -If testing was completed outside of [facility] then patient must bring images on a disc  -Established patients should arrive 15 minutes prior to appointment         Jan 22, 2024  3:15 PM  VAD FLUSH with  COR OP INFUS CHAIR [address]  [facility] OUTPATIENT INFUSION ([place]) [address] [facility] [id number] [phone]        Mar 21, 2024 10:45 AM  FOLLOW UP with Fifi Fortune MD  [facility] RADIATION ONCOLOGY ([place]) [address]  [facility] [id number] [phone]        Apr 10, 2024  9:30 AM  ECHOCARDIOGRAM VISIT with COR ECHO/VAS/LUDY OP [facility] NONINVASIVE LAB  ([facility]) [address]  [place] [address] [phone]   Bring your insurance cards with you. Wear comfortable clothing and shoes.         May 02, 2024  2:00 PM  Follow Up with ERASTO Bloom  [facility] MEDICAL [facility] CARDIOLOGY ([place]) [address] [place] [address] [phone]   -Bring photo ID, insurance card, and list of medications to appointment  -If testing was completed outside of [facility] then patient must bring images on a disc  -Copay will be collected at time of appointment  -Established patients should arrive 10 minutes prior to appointment       Additional instructions:    You have a follow-up appointment scheduled with DR Niki Burris on 1-11-24 at 2:30, office can be reached at [phone]         Activity Instructions    Activity as Tolerated         Additional Instructions for the Follow-ups that You Need to Schedule       Ambulatory Referral to Home Health   As directed      Face to Face Visit Date: 1/4/2024   Follow-up provider for Plan of Care?: I treated the patient in an acute care facility and will not continue treatment after discharge.   Follow-up provider: NIKI BURRIS [[id number]]   Reason/Clinical Findings: Diabetes with hyperglycemia, acute hypoxic respiratory failure, pneumonia, COPD exacerbation, medication supervision COPD management   Describe mobility limitations that make leaving home difficult: Patient has COPD and respiratory failure requires company to leave the house   Nursing/Therapeutic Services Requested: Physical Therapy Skilled Nursing Occupational Therapy   Skilled nursing orders: COPD management (Diabetes) Medication education   PT orders: Gait Training Strengthening   Weight Bearing Status: As Tolerated   Occupational orders: Activities of daily living Energy conservation Fine motor Home safety assessment   Frequency: 1 Week 1        Discharge Follow-up with PCP   As directed       Currently Documented PCP:    Niki Burris APRN     PCP Phone Number:    [phone]     Follow Up Details: ERASTO Arechiga (next week posthospitalization follow-up, Accu-Chek diary, BP check BMP check)        Discharge Follow-up with Specified Provider: Oncology   As directed      To: Oncology   Follow Up Details: As scheduled for history of lung cancer, review of CT scan right lower lobe?  Masslike               Follow-up Information       Niki Antony APRN .    Specialty: Nurse Practitioner  Why: ERASTO Arechiga (next week posthospitalization follow-up, Accu-Chek diary, BP check BMP check)  Contact information:  [address]  [facility] [address]  [phone]                             Pending Labs       Order Current Status    Blood Culture - Blood, Arm, Left Preliminary result    Blood Culture - Blood, Arm, Right Preliminary result             Tara Nino MD  01/04/24  11:40 EST    Please note that this discharge summary required more than 30 minutes to complete.      Electronically signed by Tara Nino MD at 01/04/24 1141       Discharge Order (From admission, onward)       Start     Ordered    01/04/24 1021  Discharge patient  Once        Expected Discharge Date: 01/04/24   Discharge Disposition: Home-Health Care [facility] [place]   Physician of Record for Attribution - Please select from Treatment Team: TARA NINO [[id number]]   Review needed by CMO to determine Physician of Record: No      Question Answer Comment   Physician of Record for Attribution - Please select from Treatment Team TARA NINO    Review needed by CMO to determine Physician of Record No        01/04/24 1026

## 2024-01-05 ENCOUNTER — TELEPHONE (OUTPATIENT)
Dept: SOCIAL WORK | Facility: HOSPITAL | Age: 79
End: 2024-01-05
Payer: MEDICARE

## 2024-01-05 LAB
BACTERIA SPEC AEROBE CULT: NORMAL
BACTERIA SPEC AEROBE CULT: NORMAL

## 2024-01-05 NOTE — TELEPHONE ENCOUNTER
[facility] per [facility] Pt has been accepted. [facility] to follow up with Pt at home.

## 2024-01-05 NOTE — PAYOR COMM NOTE
"CONTACT:  FELICIA VINCENT RN  UTILIZATION MANAGEMENT DEPT.   [facility]   [address] [facility] [place], [address]   PHONE:  [phone]   FAX: [phone]       DC NOTIFICATION TO HOME WITH HOME HEALTH ON 1/4/2024--AUTH PENDING      REF # [id number]      Charles Archer (78 y.o. Female)       Date of Birth   1945    Social Security Number       Address   [address] [address]    Home Phone   [phone]    MRN   [id number]       Hoahaoism   None    Marital Status   Single                            Admission Date   12/31/23    Admission Type   Emergency    Admitting Provider   Izabel Interiano DO    Attending Provider       Department, Room/Bed   [address], 3338/1P       Discharge Date   1/4/2024    Discharge Disposition   Home-Health Care Svc    Discharge Destination   Home                              Attending Provider: (none)   Allergies: Paclitaxel    Isolation: None   Infection: Bed Bugs (10/13/23)   Code Status: Prior    Ht: 167.6 cm (66\")   Wt: 100 kg (220 lb 14.4 oz)    Admission Cmt: None   Principal Problem: None                  Active Insurance as of 12/31/2023       Primary Coverage       Payor Plan Insurance Group Employer/Plan Group    ANTHEM MEDICARE REPLACEMENT ANTHEM MEDICARE ADVANTAGE [id number]       Payor Plan Address Payor Plan Phone Number Payor Plan Fax Number Effective Dates    [address] [phone]  2/1/2023 - None Entered    [facility] [address]         Subscriber Name Subscriber Birth Date Member ID       CHARLES ARCHER 1945 [id number]               Secondary Coverage       Payor Plan Insurance Group Employer/Plan Group    [place]        Payor Plan Address Payor Plan Phone Number Payor Plan Fax Number Effective Dates    [address]   1/1/2014 - None Entered    [facility] [address]         Subscriber Name Subscriber Birth Date Member ID       CHARLES ARCHER 1945 " [id number]                     Emergency Contacts        (Rel.) Home Phone Work Phone Mobile Phone    Hailey Quinonez (POA) (Power of ) [phone] -- --    PHIL PRASAD (alternative POA) (Power of ) -- -- [phone]    CHELO PRASAD (Daughter) [phone] -- --                 Discharge Summary        Tara Nino MD at 24 1129              [facility] HOSPITALIST MEDICINE DISCHARGE SUMMARY    Patient Identification:  Name:  Mayra Hays  Age:  78 y.o.  Sex:  female  :  1945  MRN:  [id number]  Visit Number:  [id number]    Date of Admission: 2023  Date of Discharge: 2024  DISCHARGE DISPOSITION   Stable  PCP: Niki Antony APRN    DISCHARGE DIAGNOSIS : Acute respiratory tract infection coronavirus HKU1, COPD exacerbation, diabetes type 2 with hyperglycemia, history of non-small cell CA right upper lobe on chemoradiation, bibasal pneumonia, troponin elevation flat, chronic.  History of paroxysmal atrial fibrillation with RVR present on admission resolved, chronic anticoagulation for stroke prophylaxis, acute metabolic encephalopathy from pneumonia and A-fib with RVR in respiratory infection resolved, history of chronic diastolic heart failure compensated.  Acute kidney injury on CKD stage II-IIIa, masslike consolidation right lower lobe pneumonia versus mass, to be followed by oncology.  Mediastinal lymphadenopathy.  Acute on chronic hypoxic respiratory failure resolved      HOSPITAL COURSE  Patient is a 78 y.o. female presented to [facility] complaining of increasing shortness of breath coughing, mildly confused.  On arrival patient has A-fib with RVR, she does have history of atrial fibrillation, she was temporary placed on Cardizem drip.  Troponin was elevated but reviewing chart he has been chronically elevated, delta is negative, patient denies chest discomfort, she tested positive for coronavirus HKU1, she was placed on  isolation, short course of systemic corticosteroids, diabetes was addressed with Accu-Chek and sliding scale, she was persistently hyperglycemic hence she was started on basal insulin her blood pressure was slightly elevated, she was started on Lopressor and so far is tolerated well, atrial fibrillation is now well-controlled and the majority of time is in sinus rhythm.  Initially patient has been noncompliant with her oxygen, family stated she was prescribed with oxygen at home which she never uses.  After counseling the patient she has been more compliant.  For more than 2 days she has been stable, O2 saturation has been more than 95 on 2 L nasal cannula, heart rate is well-controlled, cultures were negative, CT of the brain is negative.  Plan is to discharge her home today, due to changes on her medication including insulin, she will have home health care services for supervision, PT OT and home safety evaluation.appointment with primary care provider next week for blood pressure, BMP and Accu-Chek diary review.  With regards to the masslike consolidation in the right lung, this is concerning considering the patient has history of lung cancer, she has an appointment with oncology for review.      VITAL SIGNS:      01/02/24  0500 01/03/24  0500 01/04/24  0300   Weight: 99 kg (218 lb 3.2 oz) 100 kg (220 lb 11.2 oz) 100 kg (220 lb 14.4 oz)     Body mass index is 35.65 kg/m².  Vitals:    01/04/24 1000   BP: 142/90   Pulse: 100   Resp: 18   Temp: 98.1 °F (36.7 °C)   SpO2: 93%     PHYSICAL EXAM:  General: Comfortable,awake, alert, oriented to self, place, and time, chronically ill-appearing,  No respiratory distress.    Skin:  Skin is warm and dry. No rash noted. No pallor.    HENT:  Head:  Normocephalic and atraumatic.  Mouth:  Moist mucous membranes.    Eyes:  Conjunctivae and EOM are normal.  Pupils are equal, round, and reactive to light.  No scleral icterus.    Neck:  Neck supple.  No JVD present.     Pulmonary/Chest:  No respiratory distress, no wheezes, no crackles,  Rare wheezing left lung field basal area expiratory good air movement  Cardiovascular:  Normal rate, regular rhythm and normal heart sounds with no murmur.  Abdominal:  Soft.  Bowel sounds are normal.  No distension and no tenderness.   Extremities:  No edema, no tenderness, and no deformity.  No red or swollen joints anywhere.  Strong pulses in all 4 extremities with no clubbing, no cyanosis, no edema.  Neurological:  Motor strength equal no obvious deficit, sensory grossly intact.   No cranial nerve deficit.  No tongue deviation.  No facial droop.  No slurred speech.    Genitourinary: No Shetty catheter  Back:  ----------    DISCHARGE MEDICATIONS:     Discharge Medications        New Medications        Instructions Start Date   cefdinir 300 MG capsule  Commonly known as: OMNICEF   300 mg, Oral, 2 Times Daily      doxycycline 100 MG capsule  Commonly known as: VIBRAMYCIN   100 mg, Oral, 2 Times Daily      insulin glargine 100 UNIT/ML injection  Commonly known as: LANJONATHAN SEMGLEE   15 Units, Subcutaneous, Daily   Start Date: January 5, 2024     metoprolol tartrate 25 MG tablet  Commonly known as: LOPRESSOR   12.5 mg, Oral, Every 12 Hours Scheduled      predniSONE 10 MG tablet  Commonly known as: DELTASONE   30 mg, Oral, Daily With Breakfast   Start Date: January 5, 2024            Continue These Medications        Instructions Start Date   albuterol sulfate  (90 Base) MCG/ACT inhaler  Commonly known as: PROVENTIL HFA;VENTOLIN HFA;PROAIR HFA   2 puffs, Inhalation, Every 4 Hours PRN      apixaban 5 MG tablet tablet  Commonly known as: ELIQUIS   5 mg, Oral, Every 12 Hours Scheduled      atorvastatin 20 MG tablet  Commonly known as: LIPITOR   20 mg, Oral, Daily      Ryan Aerosphere 160-9-4.8 MCG/ACT aerosol inhaler  Generic drug: Budeson-Glycopyrrol-Formoterol   2 puffs, Inhalation, 2 Times Daily      hydrOXYzine pamoate 25 MG  capsule  Commonly known as: VISTARIL   25 mg, Oral, Nightly      lisinopril 5 MG tablet  Commonly known as: PRINIVIL,ZESTRIL   5 mg, Oral, Daily      metFORMIN 500 MG tablet  Commonly known as: GLUCOPHAGE   500 mg, Oral, 2 Times Daily PRN      pantoprazole 40 MG EC tablet  Commonly known as: PROTONIX   40 mg, Oral, Daily      spironolactone 25 MG tablet  Commonly known as: ALDACTONE   25 mg, Oral, Daily             Stop These Medications      esomeprazole 20 MG capsule  Commonly known as: nexIUM     trimethoprim 100 MG tablet  Commonly known as: TRIMPEX              Diet Instructions    Cardiac         Your Scheduled Appointments      Jan 04, 2024  4:00 PM  FOLLOW UP with Shawna Bond MD  [facility] HEMATOLOGY & ONCOLOGY ([place]) [address]  [facility] [id number] [phone]   Bring ID and  Insurance cards.  New patients are to arrive 30 minutes prior to the appointement.  If you received paperwork in the mail, fill it out and bring it with them.  All other appt's need to be here 15 minutes early.          Car 15, 2024  1:15 PM  Follow Up with Sabiha Valadez PA-C  [facility] PULMONARY & CRITICAL CARE MEDICINE ([place]) [address] [facility] [address]  [facility] [address] [phone]   Established: Please bring outside images or reports.         Jan 16, 2024  1:00 PM  Follow Up with ERASTO Rose  [facility] CARDIOTHORACIC SURGERY ([place]) [address]  [address]  [facility] [id number] [phone]   -Bring photo ID, insurance card, and list of medications to appointment  -If testing was completed outside of [facility] then patient must bring images on a disc  -Established patients should arrive 15 minutes prior to appointment         Jan 22, 2024  3:15 PM  VAD FLUSH with  COR OP INFUS CHAIR 27  [facility] OUTPATIENT INFUSION ([place]) [address] [facility] [id number] [phone]        Mar 21, 2024  10:45 AM  FOLLOW UP with Fifi Fortune MD  [facility] [place] RADIATION ONCOLOGY ([place]) [address]  [place] [address] [phone]        Apr 10, 2024  9:30 AM  ECHOCARDIOGRAM VISIT with COR ECHO/VAS/LUDY OP CART  [facility] NONINVASIVE LAB ([place]) [address]  [place] [address] [phone]   Bring your insurance cards with you. Wear comfortable clothing and shoes.         May 02, 2024  2:00 PM  Follow Up with ERASTO Bloom  [facility]ICAL [facility] CARDIOLOGY ([place]) [address]  [place] [address] [phone]   -Bring photo ID, insurance card, and list of medications to appointment  -If testing was completed outside of [facility] then patient must bring images on a disc  -Copay will be collected at time of appointment  -Established patients should arrive 10 minutes prior to appointment       Additional instructions:    You have a follow-up appointment scheduled with DR Niki Burris on 1-11-24 at 2:30, office can be reached at [phone]         Activity Instructions    Activity as Tolerated         Additional Instructions for the Follow-ups that You Need to Schedule       Ambulatory Referral to Home Health   As directed      Face to Face Visit Date: 1/4/2024   Follow-up provider for Plan of Care?: I treated the patient in an acute care facility and will not continue treatment after discharge.   Follow-up provider: NIKI BURRIS [[id number]]   Reason/Clinical Findings: Diabetes with hyperglycemia, acute hypoxic respiratory failure, pneumonia, COPD exacerbation, medication supervision COPD management   Describe mobility limitations that make leaving home difficult: Patient has COPD and respiratory failure requires company to leave the house   Nursing/Therapeutic Services Requested: Physical Therapy Skilled Nursing Occupational Therapy   Skilled nursing orders: COPD management (Diabetes) Medication education   PT orders: Gait Training Strengthening   Weight  Bearing Status: As Tolerated   Occupational orders: Activities of daily living Energy conservation Fine motor Home safety assessment   Frequency: 1 Week 1        Discharge Follow-up with PCP   As directed       Currently Documented PCP:    Niki Antony APRN    PCP Phone Number:    [phone]     Follow Up Details: ERASTO Arechiga (next week posthospitalization follow-up, Accu-Chek diary, BP check BMP check)        Discharge Follow-up with Specified Provider: Oncology   As directed      To: Oncology   Follow Up Details: As scheduled for history of lung cancer, review of CT scan right lower lobe?  Masslike               Follow-up Information       Niki Antony APRN .    Specialty: Nurse Practitioner  Why: ERASTO Arechiga (next week posthospitalization follow-up, Accu-Chek diary, BP check BMP check)  Contact information:  [address]  [place] [address]  [phone]                             Pending Labs       Order Current Status    Blood Culture - Blood, Arm, Left Preliminary result    Blood Culture - Blood, Arm, Right Preliminary result             Tara Nino MD  01/04/24  11:40 EST    Please note that this discharge summary required more than 30 minutes to complete.      Electronically signed by Tara Nino MD at 01/04/24 1141       Discharge Order (From admission, onward)       Start     Ordered    01/04/24 1021  Discharge patient  Once        Expected Discharge Date: 01/04/24   Discharge Disposition: Home-Health Care [facility]   Physician of Record for Attribution - Please select from Treatment Team: TARA NINO [[id number]]   Review needed by CMO to determine Physician of Record: No      Question Answer Comment   Physician of Record for Attribution - Please select from Treatment Team TARA NINO    Review needed by CMO to determine Physician of Record No        01/04/24 1027

## 2024-01-10 ENCOUNTER — READMISSION MANAGEMENT (OUTPATIENT)
Dept: CALL CENTER | Facility: HOSPITAL | Age: 79
End: 2024-01-10
Payer: MEDICARE

## 2024-01-10 NOTE — OUTREACH NOTE
Sepsis Week 1 Survey      Flowsheet Row Responses   [facility] patient discharged from? [facility]   Does the patient have one of the following disease processes/diagnoses(primary or secondary)? Sepsis   Week 1 attempt successful? Yes   Call start time 1435   Call end time 1443   Discharge diagnosis Sepsis due to pneumonia   Is patient permission given to speak with other caregiver? Yes   Person spoke with today (if not patient) and relationship HERMINIO Wan   Meds reviewed with patient/caregiver? Yes   Does the patient have all medications related to this admission filled (includes all antibiotics, inhalers, nebulizers,steroids,etc.) No   What is keeping the patient from filling the prescriptions? Lost script/didn't receive  [Reports script for insulin was not signed by provider]   Nursing Interventions Nurse advised patient to call provider  [Daughter reports that they contacted hospital about the script but it hasn't been resolved. Advised to contact the primary care provider.]   Is the patient taking all medications as directed (includes completed medication regime)? No   What is preventing the patient from taking all medications as directed? Other  [see above.]   Does the patient have a primary care provider?  Yes   Comments regarding PCP Follow up with Niki MAURER PCP   Does the patient have an appointment with their PCP within 7 days of discharge? No   Nursing Interventions Educated patient on importance of making appointment, Advised patient to make appointment   Has the patient kept scheduled appointments due by today? N/A   What is the Home health agency?  [facility]   Has home health visited the patient within 72 hours of discharge? Yes   Psychosocial issues? No   Did the patient receive a copy of their discharge instructions? Yes   Nursing interventions Reviewed instructions with patient  [daughter]   What is the patient's perception of their health status since discharge? Improving   Is the  patient/caregiver able to teach back signs and symptoms of worsening condition: Shortness of breath/rapid respiratory rate   If the patient is a current smoker, are they able to teach back resources for cessation? Not a smoker   Is the patient/caregiver able to teach back the hierarchy of who to call/visit for symptoms/problems? PCP, Specialist, Home health nurse, Urgent Care, ED, 911 Yes   Week 1 call completed? Yes   Is the patient interested in additional calls from an ambulatory ? No   Would this patient benefit from a Referral to Amb Social Work? No   Wrap up additional comments Difficult to get daughter engaged in call,  she was at work. Encouraged to contact PCP regarding insulin and follow up.   Call end time 1443            SILVIA DALE - Registered Nurse

## 2024-01-10 NOTE — PROGRESS NOTES
Subjective     Date: 2024    Referring Provider  No ref. provider found    Chief Complaint  Symptomatic anemia   2. Non small Cell Lung Cancer  Cancer Staging   Stage IIIA (cT1c, cN2, cM0)        Subjective      Mayra Hays is a 78 y.o. female who presents today to [facility]AT[facility] for follow up.    HPI:   78 y.o. female with past medical history of diabetes mellitus type 2, hyperlipidemia, COPD, hypertension, atrial fibrillation on anticoagulation (Eliquis), h/o CVA and previous tobacco use presents for symptomatic anemia and NSCLC.    She is present today with her daughter, Hailey, who is assisting with history. Pt started work up for RUL lung nodule , since then noticed to have a decrease in her Hgb to 10.7 from normal in 2023. More recently, her Hgb 8/9 was 6.5, she was directed to ED where she received 1U PRBC.     Patient reports increased fatigue over the last several months. She reports no bleeding, however does report dark colored stool (melena) two days ago. Denies any other evidence of melena or hematochezia.     She has previously been diagnosed with iron deficiency (years ago) requiring oral iron supplements, but never received IV iron or bone marrow biopsy.    She and her daughter are unsure of her last colonoscopy and endoscopy history, think it may have occurred >5 years ago but unsure.    She denies recent weight loss, fever, chills,  night sweats.  Previous smoker, quit 5-6 years ago, smoked 3 packs/day X 30 years. Denies alcohol or drug use. Family history significant for son with leukemia, brothers with lung cancer.     Oncology History:  2023: low dose CT chest screenin.9 cm spiculated right upper lobe pulmonary nodule concerning for malignancy, PET/CT recommended.   2023: EBUS by Dr. Peter negative for malignancy for bronchial washing and FNA of station 10R  2023: PET/CT: Right upper lobe pulmonary nodule, more  posterior possible satellite nodules are postobstructive process measuring 2.1 cm with mSUV 14.5. Also with pretracheal region lymph node 2.6 cm.   6/29/2023: CT guided needle biopsy was non diagnostic, showed fibrosis and chronic inflammation.   8/9/2023: Lab work showed anemia with Hgb 6.5. Referred to ED. Bronchoscopy deferred.   9/6/2023: Navigational bronchoscopy performed by Dr. Clemens- Right upper lobe lung transbronchial biopsy revealed squamous cell carcinoma, lymph node at station 4R also involved with squamous cell carcinoma. PD-L1 TPS 60%. Patient not deemed a surgical candidate.  9/13-11/21/2023: Received weekly carboplatin  X 7 and radiation. Patient had a reaction to paclitaxel, it was omitted.   12/19/2023: Post treatment CT chest with improvement of masslike process RUL now 1.6 cm, suggest significant response to treatment. Mediastinal lymph nodes are now within normal limits of size.     Ms. Hays presents today with her daughter, grand daughter, and great grand daughters. She is in a wheelchair. She lives alone with her grand son, able to perform her ADLs including cooking, cleaning.     Treatment history:        Interval History 10/11/2023   Ms. Hays presents for follow up today, accompanied by her daughter in law. She started treatment with paclitaxel and carboplatin on 10/9, unfortunately had a anaphylactic reaction to taxol after 8 minutes of infusion causing her to go to the ED. Patient was awake and feeling back to herself at the time of ED visit. Carboplatin was not administered.     She reports doing well overall, no new symptoms. Started radiation therapy yesterday.      Interval History 10/24/2023   Ms. Hays presents for cycle 2 of carboplatin with concurrent radiation. Did well with first cycle. Denies any adverse effects including nausea, vomiting, diarrhea, neuropathy. Appetite is good. Radiation is going well. No complaints today.  Denies any skin rash or bites.    Interval  History 11/21/2023   Ms. Hays presents today for cycle 7 of carboplatin with concurrent radiation. She reports good tolerance thus far without neuropathy, nausea, vomiting, diarrhea. Appetite is stable.   Denies any GIB.    Interval History 01/11/2024   Ms. Hays presents after completion of therapy. She was admitted to the hospital 12/31 due to shortness of breath, found to have Afib with RVR and coronavirus HKU. She received inpatient antibiotics and steroids. Currently with improvement, still reports some fatigue.   We reviewed her last CT chest, which shows improvement after treatment. Discussed continuing immunotherapy.            Objective     Objective     Allergy:   Allergies   Allergen Reactions    Paclitaxel Anaphylaxis        Current Medications:   Current Outpatient Medications   Medication Sig Dispense Refill    albuterol sulfate  (90 Base) MCG/ACT inhaler Inhale 2 puffs Every 4 (Four) Hours As Needed for Wheezing.      apixaban (ELIQUIS) 5 MG tablet tablet Take 1 tablet by mouth Every 12 (Twelve) Hours. 180 tablet 3    atorvastatin (LIPITOR) 20 MG tablet Take 1 tablet by mouth Daily. 90 tablet 3    Budeson-Glycopyrrol-Formoterol (Breztri Aerosphere) 160-9-4.8 MCG/ACT aerosol inhaler Inhale 2 puffs 2 (Two) Times a Day. 10.7 g 8    hydrOXYzine pamoate (VISTARIL) 25 MG capsule Take 1 capsule by mouth Every Night.      insulin glargine (LANTUS, SEMGLEE) 100 UNIT/ML injection Inject 15 Units under the skin into the appropriate area as directed Daily. 1 mL 12    lisinopril (PRINIVIL,ZESTRIL) 5 MG tablet Take 1 tablet by mouth Daily. 90 tablet 3    metFORMIN (GLUCOPHAGE) 500 MG tablet Take 1 tablet by mouth 2 (Two) Times a Day As Needed (only takes if blood glucose is above 200).      metoprolol tartrate (LOPRESSOR) 25 MG tablet Take 0.5 tablets by mouth Every 12 (Twelve) Hours. 30 tablet 3    pantoprazole (PROTONIX) 40 MG EC tablet Take 1 tablet by mouth Daily.      spironolactone (ALDACTONE) 25 MG  tablet Take 1 tablet by mouth Daily.       No current facility-administered medications for this visit.       Past Medical History:  Past Medical History:   Diagnosis Date    Arthritis     CHF (congestive heart failure)     Chronic anticoagulation     Eliquis    Chronic kidney disease     stage II/IIIa    Collapsed lung     COPD (chronic obstructive pulmonary disease)     Diabetes mellitus     TYPE 2    Elevated cholesterol     GERD (gastroesophageal reflux disease)     Hyperlipidemia     Hypertension     Non-small cell lung cancer RUL     Sleep apnea     non compliant with CPAP    Stroke 2019    Wears eyeglasses        Past Surgical History:  Past Surgical History:   Procedure Laterality Date    BRONCHOSCOPY Bilateral 02/27/2023    Procedure: BRONCHOSCOPY WITH ENDOBRONCHIAL ULTRASOUND;  Surgeon: Abdulkadir Peter MD;  Location: [facility] OR;  Service: Pulmonary;  Laterality: Bilateral;    BRONCHOSCOPY WITH ION ROBOTIC ASSIST N/A 9/6/2023    Procedure: BRONCHOSCOPY WITH ION ROBOT AND EBUS;  Surgeon: John Clemens MD;  Location:  [facility];  Service: Robotics - Pulmonary;  Laterality: N/A;  Ion cath #6 - 0032,  #6 - 0030, cath guide # 0077. EBUS scope removed with balloon intact.    CARDIAC CATHETERIZATION N/A 08/22/2017    Procedure: Left Heart Cath;  Surgeon: Jatinder Matias MD;  Location: [facility] CATH INVASIVE LOCATION;  Service:     CARDIAC CATHETERIZATION N/A 11/22/2021    Procedure: Left Heart Cath;  Surgeon: Tolu Steinberg MD;  Location: [facility] CATH INVASIVE LOCATION;  Service: Cardiology;  Laterality: N/A;    COLONOSCOPY      ENDOSCOPY      GALLBLADDER SURGERY      PORTACATH PLACEMENT N/A 9/29/2023    Procedure: INSERTION OF PORTACATH;  Surgeon: Petr Velásquez MD;  Location: [facility] OR;  Service: General;  Laterality: N/A;    VENTRAL HERNIA REPAIR N/A 05/14/2020    Procedure: VENTRAL HERNIA REPAIR LAPAROSCOPIC WITH DAVINCI ROBOT;  Surgeon: Petr Velásquez MD;  Location: [facility] OR;   Service: [facility];  Laterality: N/A;       Social History:  Social History     Socioeconomic History    Marital status: Single    Number of children: 6   Tobacco Use    Smoking status: Former     Packs/day: 3     Types: Cigarettes     Start date: 4/3/1998     Quit date: 2015     Years since quittin.0     Passive exposure: Past    Smokeless tobacco: Never   Vaping Use    Vaping Use: Never used   Substance and Sexual Activity    Alcohol use: No    Drug use: No    Sexual activity: Never         Family History:  Family History   Problem Relation Age of Onset    Heart disease Mother         Rhianna sophia    Heart disease Father     Heart attack Father     Heart failure Father        Review of Systems:  Review of Systems   Constitutional:  Positive for fatigue.   Respiratory:  Positive for cough and shortness of breath.    All other systems reviewed and are negative.      Vital Signs:   /82   Pulse 64   Temp 97.1 °F (36.2 °C) (Temporal)   Resp 18   SpO2 95%      Physical Exam:  Physical Exam  Vitals reviewed.   Constitutional:       General: She is not in acute distress.     Appearance: Normal appearance. She is obese. She is not ill-appearing.      Comments: +hard of hearing   HENT:      Head: Normocephalic and atraumatic.      Mouth/Throat:      Mouth: Mucous membranes are moist.      Pharynx: Oropharynx is clear.   Eyes:      Conjunctiva/sclera: Conjunctivae normal.      Pupils: Pupils are equal, round, and reactive to light.   Cardiovascular:      Rate and Rhythm: Normal rate and regular rhythm.      Heart sounds: No murmur heard.  Pulmonary:      Effort: Pulmonary effort is normal. No respiratory distress.      Breath sounds: Wheezing present.   Abdominal:      General: Abdomen is flat. Bowel sounds are normal. There is no distension.      Palpations: Abdomen is soft. There is no mass.      Tenderness: There is no abdominal tenderness. There is no guarding.   Musculoskeletal:         General: No  swelling. Normal range of motion.      Cervical back: Normal range of motion.   Lymphadenopathy:      Cervical: No cervical adenopathy.   Skin:     General: Skin is warm and dry.   Neurological:      General: No focal deficit present.      Mental Status: She is alert and oriented to person, place, and time. Mental status is at baseline.   Psychiatric:         Mood and Affect: Mood normal.         PHQ-9 Score  PHQ-9 Total Score: 0     Pain Score  Vitals:    01/11/24 1208   BP: 150/82   Pulse: 64   Resp: 18   Temp: 97.1 °F (36.2 °C)   TempSrc: Temporal   SpO2: 95%   PainSc: 0-No pain                           PAINSCOREQUALITYMETRIC:   Vitals:    01/11/24 1208   PainSc: 0-No pain                  Lab Review  Lab Results   Component Value Date    WBC 9.08 01/11/2024    HGB 11.1 (L) 01/11/2024    HCT 35.5 01/11/2024    MCV 89.2 01/11/2024    RDW 18.1 (H) 01/11/2024     01/11/2024    NEUTRORELPCT 78.7 (H) 01/11/2024    LYMPHORELPCT 11.7 (L) 01/11/2024    MONORELPCT 5.6 01/11/2024    EOSRELPCT 2.0 01/11/2024    BASORELPCT 0.7 01/11/2024    NEUTROABS 7.15 (H) 01/11/2024    LYMPHSABS 1.06 01/11/2024     Lab Results   Component Value Date     01/04/2024    K 4.2 01/04/2024    CO2 25.2 01/04/2024     01/04/2024    BUN 30 (H) 01/04/2024    CREATININE 1.04 (H) 01/04/2024    EGFRIFNONA 51 (L) 11/22/2021    GLUCOSE 296 (H) 01/04/2024    CALCIUM 9.5 01/04/2024    ALKPHOS 115 01/01/2024    AST 14 01/01/2024    ALT 9 01/01/2024    BILITOT 0.3 01/01/2024    ALBUMIN 3.1 (L) 01/01/2024    PROTEINTOT 6.1 01/01/2024    MG 2.2 01/03/2024    PHOS 3.5 05/18/2020     Lab Results   Component Value Date    RETICCTPCT 4.26 (H) 08/16/2023     Lab Results   Component Value Date    FERRITIN 539.90 (H) 12/26/2023    IRON 42 12/26/2023    TIBC 334 12/26/2023    LABIRON 13 (L) 12/26/2023    [id number] 548 08/14/2023    FOLATE 12.90 08/14/2023        Radiology Results  CT Chest Without Contrast Diagnostic (12/31/2023 20:30)    IMPRESSION:  Spiculated nodule of the right upper lobe concerning for neoplasm.  Masslike consolidation in the right lower lobe could represent pneumonia  with underlying mass not excluded. Atelectasis/airspace disease in the  left lower lobe and lingula. Mediastinal adenopathy. Follow-up  recommended.    CT Chest With Contrast Diagnostic (12/19/2023 14:47)     IMPRESSION:  1.  Significant decrease size and masslike appearance of process in the  right upper lobe which would suggest significant response to treatment.  2.  No suspicious pulmonary nodules or masses identified.  3.  Mediastinal lymph nodes are now within normal limits for size.  No  hilar or mediastinal adenopathy.  4.  Mild centrilobular nodularity of the left lower lobe which may  reflect changes of atypical pneumonia or aspiration pneumonitis.  5.  Stable cardiomegaly.  6.  Other incidental/nonacute findings above.      CT Angiogram Chest Pulmonary Embolism (10/09/2023 16:41)   MPRESSION:  1.  Right upper lobe mass in association with pneumonia has increased in  size and extent when compared to the previous exam with extension to the  pleural surface. Masslike component is approximately 56.8 x 44.5 mm and  was previously 40.1 x 36.7 mm. This would correspond to primary  malignancy.  2.  Central bronchial wall thickening. Can be seen with reactive airway  disease or bronchitis.  3.  1.3 cm left adrenal nodule is noted. No significant change from  previous.  4.  Increased size of paratracheal and mediastinal lymph nodes. A right  paratracheal lymph node is 2.5 cm and was previously 2.2 cm.  5. No PE identified.  6. Marked cardiomegaly, stable.  7. Otherwise stable chest with other nonacute/incidental findings as  above.    CT Head Without Contrast (10/09/2023 12:00)   IMPRESSION:    Unremarkable exam demonstrating no CT evidence of acute intracranial  findings.      CT Chest Without Contrast Diagnostic (08/09/2023 12:38)       NM PET/CT Skull Base to  Mid Thigh (06/20/2023 11:12)         CT Chest Low Dose Cancer Screening WO (02/14/2023 12:58)   IMPRESSION:    Interval development or enlargement of a 1.9 cm spiculated right upper  lobe pulmonary nodule concerning in appearance for malignancy and PET/CT  is recommended for further evaluation.        Pathology:     Tissue Pathology Exam (09/06/2023 08:13)         6/29/23           Assessment / Plan         Assessment and Plan   Mayra Hays is a 78 y.o. year old presents for       Non small cell lung cancer   Cancer Staging   Stage IIIA (cT1c, cN2, cM0)  -Oncology history as above. Patient with 1.9 cm spiculated right upper lobe nodule on low dose CT chest screen (2/2023). EBUS by Dr. Peter negative for malignancy (2/2023). PET/CT with hypermetabolism RUL 2.1 cm and pretracheal region lymph node (6/2023). CT guided biopsy negative for malignancy (6/2023). Navigational bronchoscopy with positive RUL and  4R (paratracheal) for squamous cell (9/6/2023). PD-L1 TPS score 60%.   -Patient not deemed for surgical resection. We reviewed her staging and treatment options which include concurrent chemo-radiation followed by adjuvant immunotherapy. Chemotherapy agents to be used would include weekly carboplatin AUC2 and paclitaxel 50 mg/m2. Chemo will be followed by one year of Durvalumab based on PACIFIC trial.   -Patient experienced anaphylaxis 8 minutes into the paclitaxel on first cycle 10/9. Due to this, paclitaxel will be omitted from weekly chemotherapy.  -Radiation therapy 10/10; 6000 cGy in 30 treatment fractions. Complete 11/21/2023  -Weekly carboplatin with concurrent radiation 10/18-11/21/2023  -Post treatment CT 12/19/23 with good response  -Cont maintenance durvalumab every 2 weeks      2. Anemia; suspicious for iron deficiency anemia due to GIB  - Patient with normal H/H in our system 2/2023, with acute drop in Hgb 6/29 (10.7) to Hgb (6.5) on 8/9 requiring transfusion. Patient reports one episode of melena two  days ago (she is a poor historian).   -Last colonoscopy and endoscopy are unknown; think it may have been >5 years ago  -CBC from 8/14 show Hgb 8.2, Hct 27, MCV 90. Normal WBC and platelet count. Iron studies with normal iron (37), TIBC (435), and low iron saturation (9). This is in light of recent blood transfusion. Normal folate and B12 level. Reticulocyte count noted to be elevated to 6%  -Patient is with fatigue. Denies shortness of breath (aside from baseline COPD) or chest pain. Continues to be on Eliquis for Afib  -Initial work up from consultation confirmed iron deficiency anemia, normal folate/b12 levels. No indication of hemolysis seen with normal LDH and haptoglobin. Myeloma work up has been negative with no M spike on serum protein electrophoresis and normal k/l free light chain ratio. Peripheral smear with normal total WBC without granulocytic dysplasia or blasts.  -Received Ferumoxytol 8/29/2023 and 11/27/23  -Pt to see surgery post treatment for repeat EGD and Colonoscopy     3. Malignancy associated pain  - Currently denies        Discussed possible differential diagnoses, testing, treatment, recommended non-pharmacological interventions, risks, warning signs to monitor for that would indicate need for follow-up in clinic or ER. If no improvement with these regimens or you have new or worsening symptoms follow-up. Patient verbalizes understanding and agreement with plan of care. Denies further needs or concerns.     Patient was given instructions and counseling regarding her condition and for health maintenance advised.       All questions were answered to her satisfaction.              Meds ordered during this visit  No orders of the defined types were placed in this encounter.      Visit Diagnoses    ICD-10-CM ICD-9-CM   1. Iron deficiency anemia, unspecified iron deficiency anemia type  D50.9 280.9   2. Malabsorption due to intolerance, not elsewhere classified  K90.49 579.8   3. Non-small cell  cancer of right lung  C34.91 162.9               Follow Up   In 1 month, prior to second cycle of IO        This document has been electronically signed by Shawna Bond MD   January 11, 2024 13:02 EST    Dictated Utilizing Dragon Dictation: Part of this note may be an electronic transcription/translation of spoken language to printed text using the Dragon Dictation System.

## 2024-01-11 ENCOUNTER — LAB (OUTPATIENT)
Dept: ONCOLOGY | Facility: CLINIC | Age: 79
End: 2024-01-11
Payer: MEDICARE

## 2024-01-11 ENCOUNTER — OFFICE VISIT (OUTPATIENT)
Dept: ONCOLOGY | Facility: CLINIC | Age: 79
End: 2024-01-11
Payer: MEDICARE

## 2024-01-11 VITALS
DIASTOLIC BLOOD PRESSURE: 82 MMHG | SYSTOLIC BLOOD PRESSURE: 150 MMHG | HEART RATE: 64 BPM | TEMPERATURE: 97.1 F | RESPIRATION RATE: 18 BRPM | OXYGEN SATURATION: 95 %

## 2024-01-11 DIAGNOSIS — C34.11 PRIMARY CANCER OF RIGHT UPPER LOBE OF LUNG: ICD-10-CM

## 2024-01-11 DIAGNOSIS — K90.49 MALABSORPTION DUE TO INTOLERANCE, NOT ELSEWHERE CLASSIFIED: ICD-10-CM

## 2024-01-11 DIAGNOSIS — D50.9 IRON DEFICIENCY ANEMIA, UNSPECIFIED IRON DEFICIENCY ANEMIA TYPE: ICD-10-CM

## 2024-01-11 DIAGNOSIS — C34.91 NON-SMALL CELL CANCER OF RIGHT LUNG: ICD-10-CM

## 2024-01-11 DIAGNOSIS — D50.9 IRON DEFICIENCY ANEMIA, UNSPECIFIED IRON DEFICIENCY ANEMIA TYPE: Primary | ICD-10-CM

## 2024-01-11 LAB
ALBUMIN SERPL-MCNC: 3.3 G/DL (ref 3.5–5.2)
ALBUMIN/GLOB SERPL: 1 G/DL
ALP SERPL-CCNC: 104 U/L (ref 39–117)
ALT SERPL W P-5'-P-CCNC: 10 U/L (ref 1–33)
ANION GAP SERPL CALCULATED.3IONS-SCNC: 9.9 MMOL/L (ref 5–15)
AST SERPL-CCNC: 11 U/L (ref 1–32)
BASOPHILS # BLD AUTO: 0.06 10*3/MM3 (ref 0–0.2)
BASOPHILS NFR BLD AUTO: 0.7 % (ref 0–1.5)
BILIRUB SERPL-MCNC: 0.4 MG/DL (ref 0–1.2)
BUN SERPL-MCNC: 14 MG/DL (ref 8–23)
BUN/CREAT SERPL: 16.7 (ref 7–25)
CALCIUM SPEC-SCNC: 9.2 MG/DL (ref 8.6–10.5)
CHLORIDE SERPL-SCNC: 104 MMOL/L (ref 98–107)
CO2 SERPL-SCNC: 25.1 MMOL/L (ref 22–29)
CREAT SERPL-MCNC: 0.84 MG/DL (ref 0.57–1)
DEPRECATED RDW RBC AUTO: 59.8 FL (ref 37–54)
EGFRCR SERPLBLD CKD-EPI 2021: 71.7 ML/MIN/1.73
EOSINOPHIL # BLD AUTO: 0.18 10*3/MM3 (ref 0–0.4)
EOSINOPHIL NFR BLD AUTO: 2 % (ref 0.3–6.2)
ERYTHROCYTE [DISTWIDTH] IN BLOOD BY AUTOMATED COUNT: 18.1 % (ref 12.3–15.4)
FERRITIN SERPL-MCNC: 542.8 NG/ML (ref 13–150)
GLOBULIN UR ELPH-MCNC: 3.4 GM/DL
GLUCOSE SERPL-MCNC: 123 MG/DL (ref 65–99)
HCT VFR BLD AUTO: 35.5 % (ref 34–46.6)
HGB BLD-MCNC: 11.1 G/DL (ref 12–15.9)
IMM GRANULOCYTES # BLD AUTO: 0.12 10*3/MM3 (ref 0–0.05)
IMM GRANULOCYTES NFR BLD AUTO: 1.3 % (ref 0–0.5)
IRON 24H UR-MRATE: 70 MCG/DL (ref 37–145)
IRON SATN MFR SERPL: 27 % (ref 20–50)
LYMPHOCYTES # BLD AUTO: 1.06 10*3/MM3 (ref 0.7–3.1)
LYMPHOCYTES NFR BLD AUTO: 11.7 % (ref 19.6–45.3)
MCH RBC QN AUTO: 27.9 PG (ref 26.6–33)
MCHC RBC AUTO-ENTMCNC: 31.3 G/DL (ref 31.5–35.7)
MCV RBC AUTO: 89.2 FL (ref 79–97)
MONOCYTES # BLD AUTO: 0.51 10*3/MM3 (ref 0.1–0.9)
MONOCYTES NFR BLD AUTO: 5.6 % (ref 5–12)
NEUTROPHILS NFR BLD AUTO: 7.15 10*3/MM3 (ref 1.7–7)
NEUTROPHILS NFR BLD AUTO: 78.7 % (ref 42.7–76)
NRBC BLD AUTO-RTO: 0 /100 WBC (ref 0–0.2)
PLATELET # BLD AUTO: 277 10*3/MM3 (ref 140–450)
PMV BLD AUTO: 10 FL (ref 6–12)
POTASSIUM SERPL-SCNC: 3.8 MMOL/L (ref 3.5–5.2)
PROT SERPL-MCNC: 6.7 G/DL (ref 6–8.5)
RBC # BLD AUTO: 3.98 10*6/MM3 (ref 3.77–5.28)
SODIUM SERPL-SCNC: 139 MMOL/L (ref 136–145)
TIBC SERPL-MCNC: 261 MCG/DL (ref 298–536)
TRANSFERRIN SERPL-MCNC: 175 MG/DL (ref 200–360)
WBC NRBC COR # BLD AUTO: 9.08 10*3/MM3 (ref 3.4–10.8)

## 2024-01-11 PROCEDURE — 83540 ASSAY OF IRON: CPT | Performed by: INTERNAL MEDICINE

## 2024-01-11 PROCEDURE — 85025 COMPLETE CBC W/AUTO DIFF WBC: CPT | Performed by: INTERNAL MEDICINE

## 2024-01-11 PROCEDURE — 84466 ASSAY OF TRANSFERRIN: CPT | Performed by: INTERNAL MEDICINE

## 2024-01-11 PROCEDURE — 80053 COMPREHEN METABOLIC PANEL: CPT | Performed by: INTERNAL MEDICINE

## 2024-01-11 PROCEDURE — 82728 ASSAY OF FERRITIN: CPT | Performed by: INTERNAL MEDICINE

## 2024-01-11 NOTE — PROGRESS NOTES
Venipuncture Blood Specimen Collection  Venipuncture performed in left hand by Fe Villanueva MA with good hemostasis. Patient tolerated the procedure well without complications.   01/11/24   Fe Villanueva MA

## 2024-01-15 ENCOUNTER — TELEPHONE (OUTPATIENT)
Dept: CARDIAC SURGERY | Facility: CLINIC | Age: 79
End: 2024-01-15
Payer: MEDICARE

## 2024-01-15 NOTE — TELEPHONE ENCOUNTER
Per Keiko MAURER-Patient not surgical candidate and she is aware, this appointment was to make sure she is established with Oncology and patient is seeing Dr. Potter, I have talked with daughter Hailey and she is aware Ms. Hays does not need to FU with our office)

## 2024-01-19 ENCOUNTER — READMISSION MANAGEMENT (OUTPATIENT)
Dept: CALL CENTER | Facility: HOSPITAL | Age: 79
End: 2024-01-19
Payer: MEDICARE

## 2024-01-19 NOTE — OUTREACH NOTE
Sepsis Week 2 Survey      Flowsheet Row Responses   [facility] facility patient discharged from? [place]   Does the patient have one of the following disease processes/diagnoses(primary or secondary)? Sepsis   Week 2 attempt successful? No   Unsuccessful attempts Attempt 1            SIMIN FREEDMAN - Registered Nurse

## 2024-01-23 ENCOUNTER — READMISSION MANAGEMENT (OUTPATIENT)
Dept: CALL CENTER | Facility: HOSPITAL | Age: 79
End: 2024-01-23
Payer: MEDICARE

## 2024-01-23 NOTE — OUTREACH NOTE
Sepsis Week 2 Survey      Flowsheet Row Responses   [facility] patient discharged from? [place]   Does the patient have one of the following disease processes/diagnoses(primary or secondary)? Sepsis   Week 2 attempt successful? Yes   Call start time 1316   Call end time 1317   Discharge diagnosis Sepsis due to pneumonia   Person spoke with today (if not patient) and relationship HERMINIO Wan reviewed with patient/caregiver? Yes   Is the patient having any side effects they believe may be caused by any medication additions or changes? No   Does the patient have all medications related to this admission filled (includes all antibiotics, inhalers, nebulizers,steroids,etc.) Yes   Is the patient taking all medications as directed (includes completed medication regime)? Yes   Does the patient have a primary care provider?  Yes   Comments regarding PCP 1/29/24   Does the patient have an appointment with their PCP within 7 days of discharge? Yes   Has the patient kept scheduled appointments due by today? N/A   Psychosocial issues? No   What is the patient's perception of their health status since discharge? Improving   Nursing interventions Nurse provided patient education   Is the patient/caregiver able to teach back TIME? T emperature - higher or lower than normal, E xtremely Ill - severe pain, discomfort, shortness of breath   Nursing interventions Nurse provided reassurance to patient   Is patient/caregiver able to teach back steps to recovery at home? Rest and regain strength   Is the patient/caregiver able to teach back signs and symptoms of worsening condition: Fever, Shortness of breath/rapid respiratory rate   If the patient is a current smoker, are they able to teach back resources for cessation? Not a smoker   Is the patient/caregiver able to teach back the hierarchy of who to call/visit for symptoms/problems? PCP, Specialist, Home health nurse, Urgent Care, ED, 911 Yes   Week 2 call completed? Yes    Is the patient interested in additional calls from an ambulatory ? No   Would this patient benefit from a Referral to [facility] Social Work? No   Wrap up additional comments Hailey dtr states pt is doing better, and denies pt has SOB/chest pain/cough. No medication issues. Pt has upcoming PCP fu appt.   Call end time 1317            Cookie TSAI - Registered Nurse

## 2024-01-25 DIAGNOSIS — C34.11 PRIMARY CANCER OF RIGHT UPPER LOBE OF LUNG: ICD-10-CM

## 2024-01-25 DIAGNOSIS — C34.91 NON-SMALL CELL CANCER OF RIGHT LUNG: Primary | ICD-10-CM

## 2024-01-25 RX ORDER — SODIUM CHLORIDE 9 MG/ML
250 INJECTION, SOLUTION INTRAVENOUS ONCE
OUTPATIENT
Start: 2024-02-09

## 2024-01-25 RX ORDER — SODIUM CHLORIDE 9 MG/ML
250 INJECTION, SOLUTION INTRAVENOUS ONCE
OUTPATIENT
Start: 2024-02-23

## 2024-01-26 ENCOUNTER — OFFICE VISIT (OUTPATIENT)
Dept: ONCOLOGY | Facility: CLINIC | Age: 79
End: 2024-01-26
Payer: MEDICARE

## 2024-01-26 ENCOUNTER — INFUSION (OUTPATIENT)
Dept: ONCOLOGY | Facility: HOSPITAL | Age: 79
End: 2024-01-26
Payer: MEDICARE

## 2024-01-26 ENCOUNTER — LAB (OUTPATIENT)
Dept: ONCOLOGY | Facility: HOSPITAL | Age: 79
End: 2024-01-26
Payer: MEDICARE

## 2024-01-26 VITALS
TEMPERATURE: 97.1 F | BODY MASS INDEX: 35.83 KG/M2 | WEIGHT: 222 LBS | OXYGEN SATURATION: 91 % | SYSTOLIC BLOOD PRESSURE: 184 MMHG | HEART RATE: 85 BPM | DIASTOLIC BLOOD PRESSURE: 80 MMHG | RESPIRATION RATE: 18 BRPM

## 2024-01-26 VITALS
WEIGHT: 222 LBS | RESPIRATION RATE: 18 BRPM | SYSTOLIC BLOOD PRESSURE: 170 MMHG | BODY MASS INDEX: 35.83 KG/M2 | DIASTOLIC BLOOD PRESSURE: 73 MMHG | OXYGEN SATURATION: 91 % | TEMPERATURE: 97.1 F | HEART RATE: 57 BPM

## 2024-01-26 DIAGNOSIS — C34.91 NON-SMALL CELL CANCER OF RIGHT LUNG: ICD-10-CM

## 2024-01-26 DIAGNOSIS — C34.11 PRIMARY CANCER OF RIGHT UPPER LOBE OF LUNG: ICD-10-CM

## 2024-01-26 DIAGNOSIS — Z95.828 PORT-A-CATH IN PLACE: ICD-10-CM

## 2024-01-26 DIAGNOSIS — C34.11 PRIMARY CANCER OF RIGHT UPPER LOBE OF LUNG: Primary | ICD-10-CM

## 2024-01-26 LAB
ALBUMIN SERPL-MCNC: 4 G/DL (ref 3.5–5.2)
ALBUMIN/GLOB SERPL: 1.3 G/DL
ALP SERPL-CCNC: 121 U/L (ref 39–117)
ALT SERPL W P-5'-P-CCNC: 12 U/L (ref 1–33)
ANION GAP SERPL CALCULATED.3IONS-SCNC: 7.4 MMOL/L (ref 5–15)
AST SERPL-CCNC: 15 U/L (ref 1–32)
BASOPHILS # BLD AUTO: 0.04 10*3/MM3 (ref 0–0.2)
BASOPHILS NFR BLD AUTO: 0.9 % (ref 0–1.5)
BILIRUB SERPL-MCNC: 0.3 MG/DL (ref 0–1.2)
BUN SERPL-MCNC: 11 MG/DL (ref 8–23)
BUN/CREAT SERPL: 12.2 (ref 7–25)
CALCIUM SPEC-SCNC: 9.3 MG/DL (ref 8.6–10.5)
CHLORIDE SERPL-SCNC: 105 MMOL/L (ref 98–107)
CO2 SERPL-SCNC: 29.6 MMOL/L (ref 22–29)
CREAT SERPL-MCNC: 0.9 MG/DL (ref 0.57–1)
DEPRECATED RDW RBC AUTO: 53.1 FL (ref 37–54)
EGFRCR SERPLBLD CKD-EPI 2021: 66 ML/MIN/1.73
EOSINOPHIL # BLD AUTO: 0.3 10*3/MM3 (ref 0–0.4)
EOSINOPHIL NFR BLD AUTO: 6.7 % (ref 0.3–6.2)
ERYTHROCYTE [DISTWIDTH] IN BLOOD BY AUTOMATED COUNT: 16 % (ref 12.3–15.4)
GLOBULIN UR ELPH-MCNC: 3 GM/DL
GLUCOSE SERPL-MCNC: 169 MG/DL (ref 65–99)
HCT VFR BLD AUTO: 35.4 % (ref 34–46.6)
HGB BLD-MCNC: 10.9 G/DL (ref 12–15.9)
IMM GRANULOCYTES # BLD AUTO: 0.02 10*3/MM3 (ref 0–0.05)
IMM GRANULOCYTES NFR BLD AUTO: 0.4 % (ref 0–0.5)
LYMPHOCYTES # BLD AUTO: 0.89 10*3/MM3 (ref 0.7–3.1)
LYMPHOCYTES NFR BLD AUTO: 20 % (ref 19.6–45.3)
MCH RBC QN AUTO: 28 PG (ref 26.6–33)
MCHC RBC AUTO-ENTMCNC: 30.8 G/DL (ref 31.5–35.7)
MCV RBC AUTO: 91 FL (ref 79–97)
MONOCYTES # BLD AUTO: 0.48 10*3/MM3 (ref 0.1–0.9)
MONOCYTES NFR BLD AUTO: 10.8 % (ref 5–12)
NEUTROPHILS NFR BLD AUTO: 2.73 10*3/MM3 (ref 1.7–7)
NEUTROPHILS NFR BLD AUTO: 61.2 % (ref 42.7–76)
NRBC BLD AUTO-RTO: 0 /100 WBC (ref 0–0.2)
PLATELET # BLD AUTO: 166 10*3/MM3 (ref 140–450)
PMV BLD AUTO: 10.6 FL (ref 6–12)
POTASSIUM SERPL-SCNC: 3.5 MMOL/L (ref 3.5–5.2)
PROT SERPL-MCNC: 7 G/DL (ref 6–8.5)
RBC # BLD AUTO: 3.89 10*6/MM3 (ref 3.77–5.28)
SODIUM SERPL-SCNC: 142 MMOL/L (ref 136–145)
T4 FREE SERPL-MCNC: 0.96 NG/DL (ref 0.93–1.7)
TSH SERPL DL<=0.05 MIU/L-ACNC: 2.05 UIU/ML (ref 0.27–4.2)
WBC NRBC COR # BLD AUTO: 4.46 10*3/MM3 (ref 3.4–10.8)

## 2024-01-26 PROCEDURE — 84439 ASSAY OF FREE THYROXINE: CPT | Performed by: NURSE PRACTITIONER

## 2024-01-26 PROCEDURE — 96413 CHEMO IV INFUSION 1 HR: CPT

## 2024-01-26 PROCEDURE — 25010000002 DURVALUMAB 500 MG/10ML SOLUTION 10 ML VIAL: Performed by: NURSE PRACTITIONER

## 2024-01-26 PROCEDURE — 85025 COMPLETE CBC W/AUTO DIFF WBC: CPT | Performed by: NURSE PRACTITIONER

## 2024-01-26 PROCEDURE — 84443 ASSAY THYROID STIM HORMONE: CPT | Performed by: NURSE PRACTITIONER

## 2024-01-26 PROCEDURE — 80053 COMPREHEN METABOLIC PANEL: CPT | Performed by: NURSE PRACTITIONER

## 2024-01-26 PROCEDURE — 25810000003 SODIUM CHLORIDE 0.9 % SOLUTION 250 ML FLEX CONT: Performed by: NURSE PRACTITIONER

## 2024-01-26 PROCEDURE — 25810000003 SODIUM CHLORIDE 0.9 % SOLUTION: Performed by: NURSE PRACTITIONER

## 2024-01-26 PROCEDURE — 25010000002 HEPARIN LOCK FLUSH PER 10 UNITS: Performed by: NURSE PRACTITIONER

## 2024-01-26 RX ORDER — HEPARIN SODIUM (PORCINE) LOCK FLUSH IV SOLN 100 UNIT/ML 100 UNIT/ML
300 SOLUTION INTRAVENOUS ONCE
Status: CANCELLED | OUTPATIENT
Start: 2024-01-26

## 2024-01-26 RX ORDER — SODIUM CHLORIDE 9 MG/ML
250 INJECTION, SOLUTION INTRAVENOUS ONCE
Status: COMPLETED | OUTPATIENT
Start: 2024-01-26 | End: 2024-01-26

## 2024-01-26 RX ORDER — HEPARIN SODIUM (PORCINE) LOCK FLUSH IV SOLN 100 UNIT/ML 100 UNIT/ML
500 SOLUTION INTRAVENOUS AS NEEDED
Status: CANCELLED | OUTPATIENT
Start: 2024-02-08

## 2024-01-26 RX ORDER — LIDOCAINE AND PRILOCAINE 25; 25 MG/G; MG/G
CREAM TOPICAL
Qty: 30 G | Refills: 1 | Status: SHIPPED | OUTPATIENT
Start: 2024-01-26

## 2024-01-26 RX ORDER — ONDANSETRON HYDROCHLORIDE 8 MG/1
8 TABLET, FILM COATED ORAL EVERY 8 HOURS PRN
Qty: 30 TABLET | Refills: 1 | Status: SHIPPED | OUTPATIENT
Start: 2024-01-26

## 2024-01-26 RX ORDER — SODIUM CHLORIDE 0.9 % (FLUSH) 0.9 %
10 SYRINGE (ML) INJECTION AS NEEDED
Status: DISCONTINUED | OUTPATIENT
Start: 2024-01-26 | End: 2025-04-03 | Stop reason: HOSPADM

## 2024-01-26 RX ORDER — SODIUM CHLORIDE 0.9 % (FLUSH) 0.9 %
20 SYRINGE (ML) INJECTION AS NEEDED
Status: CANCELLED | OUTPATIENT
Start: 2024-01-26

## 2024-01-26 RX ORDER — PROCHLORPERAZINE MALEATE 10 MG
10 TABLET ORAL EVERY 6 HOURS PRN
Qty: 30 TABLET | Refills: 1 | Status: SHIPPED | OUTPATIENT
Start: 2024-01-26

## 2024-01-26 RX ORDER — SODIUM CHLORIDE 0.9 % (FLUSH) 0.9 %
10 SYRINGE (ML) INJECTION AS NEEDED
Status: CANCELLED | OUTPATIENT
Start: 2024-02-08

## 2024-01-26 RX ORDER — HEPARIN SODIUM (PORCINE) LOCK FLUSH IV SOLN 100 UNIT/ML 100 UNIT/ML
500 SOLUTION INTRAVENOUS AS NEEDED
Status: DISCONTINUED | OUTPATIENT
Start: 2024-01-26 | End: 2025-04-03 | Stop reason: HOSPADM

## 2024-01-26 RX ADMIN — SODIUM CHLORIDE 250 ML: 9 INJECTION, SOLUTION INTRAVENOUS at 14:24

## 2024-01-26 RX ADMIN — Medication 500 UNITS: at 15:58

## 2024-01-26 RX ADMIN — SODIUM CHLORIDE 1000 MG: 9 INJECTION, SOLUTION INTRAVENOUS at 14:25

## 2024-01-26 NOTE — PROGRESS NOTES
IMMUNOTHERAPY PREPARATION    Mayra Hays  [id number]  1945    Chief Complaint: Immunotherapy Education    History of present illness:  Mayra Hays is a 78 y.o. year old female who is here today for immunotherapy preparation and needs assessment. The patient has been diagnosed with lung cancer and is scheduled to begin treatment today 01/26/2024 with Durvalumab. She is without specific complaints today.     Oncology History:    Oncology/Hematology History   Primary cancer of right upper lobe of lung   9/12/2023 Initial Diagnosis    Primary cancer of right upper lobe of lung     9/12/2023 Cancer Staged    Staging form: Lung, AJCC 8th Edition  - Clinical stage from 9/12/2023: Stage IIIA (cT1c, cN2, cM0) - Signed by Shawna Bond MD on 9/12/2023     10/9/2023 - 11/21/2023 Chemotherapy    OP LUNG CARBOplatin AUC=2 (Weekly) + XRT     1/26/2024 -  Chemotherapy    OP LUNG Durvalumab     Non-small cell cancer of right lung   9/13/2023 Initial Diagnosis    Non-small cell cancer of right lung     10/9/2023 - 11/21/2023 Chemotherapy    OP LUNG CARBOplatin AUC=2 (Weekly) + XRT     1/26/2024 -  Chemotherapy    OP LUNG Durvalumab       Past Medical History:   Diagnosis Date    Arthritis     CHF (congestive heart failure)     Chronic anticoagulation     Eliquis    Chronic kidney disease     stage II/IIIa    Collapsed lung     COPD (chronic obstructive pulmonary disease)     Diabetes mellitus     TYPE 2    Elevated cholesterol     GERD (gastroesophageal reflux disease)     Hyperlipidemia     Hypertension     Non-small cell lung cancer RUL     Sleep apnea     non compliant with CPAP    Stroke 2019    Wears eyeglasses      Past Surgical History:   Procedure Laterality Date    BRONCHOSCOPY Bilateral 02/27/2023    Procedure: BRONCHOSCOPY WITH ENDOBRONCHIAL ULTRASOUND;  Surgeon: Abdulkadir Peter MD;  Location: [facility];  Service: Pulmonary;  Laterality: Bilateral;    BRONCHOSCOPY WITH ION ROBOTIC ASSIST N/A 9/6/2023     Procedure: BRONCHOSCOPY WITH ION ROBOT AND EBUS;  Surgeon: John Clemens MD;  Location:  [facility];  Service: Robotics - Pulmonary;  Laterality: N/A;  Ion cath #6 - 0032,  #6 - 0030, cath guide # 0077. EBUS scope removed with balloon intact.    CARDIAC CATHETERIZATION N/A 08/22/2017    Procedure: Left Heart Cath;  Surgeon: Jatinder Matias MD;  [facility];  Service:     CARDIAC CATHETERIZATION N/A 11/22/2021    Procedure: Left Heart Cath;  Surgeon: Tolu Steinberg MD;  [facility];  Service: Cardiology;  Laterality: N/A;    COLONOSCOPY      ENDOSCOPY      GALLBLADDER SURGERY      PORTACATH PLACEMENT N/A 9/29/2023    Procedure: INSERTION OF PORTACATH;  Surgeon: Petr Velásquez MD;  Location:  [facility];  Service: General;  Laterality: N/A;    VENTRAL HERNIA REPAIR N/A 05/14/2020    Procedure: VENTRAL HERNIA REPAIR LAPAROSCOPIC WITH DAVINCI ROBOT;  Surgeon: Petr Velásquez MD;  Location:  [facility];  Service: DaVinci;  Laterality: N/A;     Family History   Problem Relation Age of Onset    Heart disease Mother         Rhianna sophia    Heart disease Father     Heart attack Father     Heart failure Father        Medications: The current medication list was reviewed and reconciled.     Allergies:  is allergic to paclitaxel.  Review of Systems:  A comprehensive 14 point review of systems was conducted with patient and positive as per HPI otherwise negative.    Physical Exam:    Vitals:    01/26/24 1253   BP: (!) 184/80   Pulse: 85   Resp: 18   Temp: 97.1 °F (36.2 °C)   SpO2: 91%     Vitals:    01/26/24 1253   PainSc: 0-No pain        ECOG: (1) Restricted in Physically Strenuous Activity, Ambulatory & Able to Do Work of Light Nature    General/Constitutional: Awake, alert and oriented. No apparent acute distress is noted.  HEENT: Normocephalic, atraumatic. PERRLA, conjunctiva normal. Extraocular movements are intact.  Neck: No JVD, thyromegaly  "or cervical lymphadenopathy.  Cardiovascular: S1, S2. Regular rate and rhythm, no murmurs, rubs or gallops.  Respiratory: Pulmonary effort is normal, lungs are clear to auscultation bilaterally. No wheezing, rhonchi or rales. No use of accessory muscles, no retractions.  Abdomen: Soft, non-tender, non-distended with normoactive bowel sounds x 4 quadrants. No palpable hepatosplenomegaly.  Lymph: No cervical, supraclavicular, axillary, inguinal or femoral adenopathy.  Integumentary: Skin warm and dry. No bruising, ecchymosis.  Extremities: No clubbing, cyanosis or edema.  Neurological: Alert and oriented x 3. Grossly non-focal examination.            NEEDS ASSESSMENTS    Genetics  The patient's new diagnosis and family history have been reviewed for genetic counseling needs. A genetic referral is not recommended.     Barriers to care  A barriers form was also completed by the patient today. We discussed services offered by our facility to help her have adequate access to care. The patient was given the name and card for our Oncology Social Worker, Candace Castellon. Based upon barriers assessment today, the patient will not require a follow-up call from the  to further discuss needs.     VAD Assessment  The patient and I discussed planned intervenous chemotherapy as well as other IV treatments that are often needed throughout the course of treatment. These may include, but are not limited to blood transfusions, antibiotics, and IV hydration. The vasculature does not appear to be adequate for multiple peripheral IVs throughout their treatment course. Discussed risks and benefits of VADs. The patient would like to pursue Port-A-Cath insertion prior to initiation of treatment.     Advanced Care Planning  The patient and I discussed advanced care planning, \"Conversations that Matter\".   This service was offered, free of charge, for development of advance directives with a certified ACP facilitator.  The patient does " not have an up-to-date advanced directive. This document is not on file with our office. The patient is not interested in an appointment with one of our facilitators to create or update their advanced directives.      Palliative Care  The patient and I discussed palliative care services. Palliative care is not the same as Hospice care. This is specialized medical care for people living with serious illness with the goal of improving quality of life for the patient and their family. [facility] has partnered with [facility] to offer our patients outpatient palliative care early along with their treatment to assist in coordination of care, symptom management, pain management, and medical decision making.  Oncology criteria for palliative care referral is not met at this time. The patient is not interested in a palliative care consultation.     Additional Referral needs  none      CHEMOTHERAPY EDUCATION    Booklets Given: Chemotherapy and You [x]  Eating Hints [x]    Sexuality/Fertility Books []      Chemotherapy/Biotherapy Education Sheets: (list all that apply)  nausea management, acid reflux management, diarrhea management, Cancer resourse contacts information, skin and mouth care, and vaccination information                                                                                                                                                                 Chemotherapy Regimen:   Treatment Plans       Name Type Hold Status Plan Dates Plan Provider       Active    OP SUPPORTIVE Ferumoxytol 510 mg ONCOLOGY SUPPORTIVE CARE 1 On Automatic Hold  11/27/2023 - Present Shawna Bond MD    OP LUNG Durvalumab ONCOLOGY TREATMENT Not on Hold  1/18/2024 - Present Shawna Bond MD                     TOPICS EDUCATION PROVIDED COMMENTS   ANEMIA:  role of RBC, cause, s/s, ways to manage, role of transfusion [x]    THROMBOCYTOPENIA:  role of platelet, cause, s/s, ways to prevent bleeding, things to avoid,  when to seek help [x]    NEUTROPENIA:  role of WBC, cause, infection precautions, s/s of infection, when to call MD [x]    NUTRITION & APPETITE CHANGES:  importance of maintaining healthy diet & weight, ways to manage to improve intake, dietary consult, exercise regimen [x]    DIARRHEA:  causes, s/s of dehydration, ways to manage, dietary changes, when to call MD [x]    CONSTIPATION:  causes, ways to manage, dietary changes, when to call MD [x]    NAUSEA & VOMITING:  cause, use of antiemetics, dietary changes, when to call MD [x]    MOUTH SORES:  causes, oral care, ways to manage [x]    ALOPECIA:  cause, ways to manage, resources [x]    INFERTILITY & SEXUALITY:  causes, fertility preservation options, sexuality changes, ways to manage, importance of birth control [x]    NERVOUS SYSTEM CHANGES:  causes, s/s, neuropathies, cognitive changes, ways to manage [x]    PAIN:  causes, ways to manage [x]    SKIN & NAIL CHANGES:  cause, s/s, ways to manage [x]    ORGAN TOXICITIES:  cause, s/s, need for diagnostic tests, labs, when to notify MD [x]    SURVIVORSHIP:  distress, distress assessment, secondary malignancies, early/late effects, follow-up, social issues, social support [x]    HOME CARE:  use of spill kits, storing of PO chemo, how to manage bodily fluids [x]    MISCELLANEOUS:  drug interactions, administration, vesicant, et [x]      Assessment and Plan:    Diagnoses and all orders for this visit:    1. Non-small cell cancer of right lung  -     CBC and Differential  -     Comprehensive metabolic panel  -     TSH  -     T4, free    2. Primary cancer of right upper lobe of lung  -     CBC and Differential  -     Comprehensive metabolic panel  -     TSH  -     T4, free    Other orders  -     ondansetron (Zofran) 8 MG tablet; Take 1 tablet by mouth Every 8 (Eight) Hours As Needed for Nausea or Vomiting.  Dispense: 30 tablet; Refill: 1  -     prochlorperazine (COMPAZINE) 10 MG tablet; Take 1 tablet by mouth Every 6 (Six)  Hours As Needed for Nausea or Vomiting.  Dispense: 30 tablet; Refill: 1  -     lidocaine-prilocaine (EMLA) 2.5-2.5 % cream; Apply to port-a-cath site 30 minutes prior to arrival at infusion center. Cover with plastic wrap.  Dispense: 30 g; Refill: 1      - Chemotherapy teaching was also completed today as documented above. Adequate time was given to answer all questions to her satisfaction. Patient and family are aware of their care team members and contact information if they have questions or problems throughout the treatment course. Needs assessments and education has been completed. The patient is adequately prepared to begin treatment as scheduled.   - Reviewed with patient education regarding EMLA cream, Compazine, and Zofran and prescriptions were sent to the pharmacy of patient's choice.  - I advised the patient that she can take Tylenol or Ibuprofen as needed for aches/pains related to cancer/treatment. I also advised patient that she could use Senakot or Miralax as needed for constipation or Imodium as needed for diarrhea.    - I reviewed with the patient the care team members. I also reviewed the option of the urgent care clinic through our oncology office for evaluation and management of symptoms related to treatment. Patient was provided with phone number to call during regular office hours [phone] press #1 and for treatment related questions call [phone] to speak to a nurse. If after hours or on the weekend call [phone] and ask to page the MD on call for [facility] Oncology services.   - Consent for treatment with Durvalumab as recommended by Dr. Bond for the treatment of lung cancer was signed by the patient today.         I spent 60 minutes with Mayra Hays today.  In the office today, more than 50% of this time was spent face-to-face with her  in counseling / coordination of care, reviewing her interim medical history and counseling on the current treatment  plan.  All questions were answered to her satisfaction.             Electronically Signed by: ERASTO Lundberg , January 26, 2024 13:34 EST

## 2024-01-31 ENCOUNTER — READMISSION MANAGEMENT (OUTPATIENT)
Dept: CALL CENTER | Facility: HOSPITAL | Age: 79
End: 2024-01-31
Payer: MEDICARE

## 2024-01-31 NOTE — OUTREACH NOTE
Sepsis Week 3 Survey      Flowsheet Row Responses   [facility] patient discharged from? [facility]   Does the patient have one of the following disease processes/diagnoses(primary or secondary)? Sepsis   Week 3 attempt successful? Yes   Call start time 1424   Call end time 1427   Discharge diagnosis Sepsis due to pneumonia   Person spoke with today (if not patient) and relationship HERMINIO Wan   Is the patient taking all medications as directed (includes completed medication regime)? Yes   Medication comments completed steroid and antibiotics   Does the patient have a primary care provider?  Yes   Does the patient have an appointment with their PCP within 7 days of discharge? Greater than 7 days   Psychosocial issues? No   What is the patient's perception of their health status since discharge? Improving   If the patient is a current smoker, are they able to teach back resources for cessation? Not a smoker   Is the patient/caregiver able to teach back the hierarchy of who to call/visit for symptoms/problems? PCP, Specialist, Home health nurse, Urgent Care, ED, 911 Yes   Additional teach back comments Daughter states she is doing well and will see PCP on Tues.   Week 3 call completed? Yes   Graduated/Revoked comments Denies questions or needs at this time.   Call end time 1427            Tigist AGRAWAL - Licensed Nurse

## 2024-02-01 ENCOUNTER — OFFICE VISIT (OUTPATIENT)
Dept: PULMONOLOGY | Facility: CLINIC | Age: 79
End: 2024-02-01
Payer: MEDICARE

## 2024-02-01 VITALS
WEIGHT: 222 LBS | SYSTOLIC BLOOD PRESSURE: 118 MMHG | BODY MASS INDEX: 35.68 KG/M2 | HEART RATE: 84 BPM | HEIGHT: 66 IN | OXYGEN SATURATION: 89 % | TEMPERATURE: 98.1 F | DIASTOLIC BLOOD PRESSURE: 66 MMHG

## 2024-02-01 DIAGNOSIS — J96.11 CHRONIC RESPIRATORY FAILURE WITH HYPOXIA: ICD-10-CM

## 2024-02-01 DIAGNOSIS — Z87.891 FORMER SMOKER: ICD-10-CM

## 2024-02-01 DIAGNOSIS — C34.91 NON-SMALL CELL CANCER OF RIGHT LUNG: ICD-10-CM

## 2024-02-01 DIAGNOSIS — E66.01 CLASS 2 SEVERE OBESITY DUE TO EXCESS CALORIES WITH SERIOUS COMORBIDITY AND BODY MASS INDEX (BMI) OF 35.0 TO 35.9 IN ADULT: ICD-10-CM

## 2024-02-01 DIAGNOSIS — J44.9 CHRONIC OBSTRUCTIVE PULMONARY DISEASE, UNSPECIFIED COPD TYPE: Primary | ICD-10-CM

## 2024-02-01 PROCEDURE — 3078F DIAST BP <80 MM HG: CPT | Performed by: NURSE PRACTITIONER

## 2024-02-01 PROCEDURE — 3074F SYST BP LT 130 MM HG: CPT | Performed by: NURSE PRACTITIONER

## 2024-02-01 PROCEDURE — 99214 OFFICE O/P EST MOD 30 MIN: CPT | Performed by: NURSE PRACTITIONER

## 2024-02-01 PROCEDURE — 1159F MED LIST DOCD IN RCRD: CPT | Performed by: NURSE PRACTITIONER

## 2024-02-01 PROCEDURE — 1160F RVW MEDS BY RX/DR IN RCRD: CPT | Performed by: NURSE PRACTITIONER

## 2024-02-01 RX ORDER — ALBUTEROL SULFATE 90 UG/1
2 AEROSOL, METERED RESPIRATORY (INHALATION) EVERY 4 HOURS PRN
Qty: 18 G | Refills: 5 | Status: SHIPPED | OUTPATIENT
Start: 2024-02-01

## 2024-02-01 RX ORDER — BUDESONIDE, GLYCOPYRROLATE, AND FORMOTEROL FUMARATE 160; 9; 4.8 UG/1; UG/1; UG/1
2 AEROSOL, METERED RESPIRATORY (INHALATION) 2 TIMES DAILY
Qty: 10.7 G | Refills: 8 | Status: SHIPPED | OUTPATIENT
Start: 2024-02-01

## 2024-02-01 NOTE — PROGRESS NOTES
"Chief Complaint  COPD    Subjective        Mayra Hays presents to [facility] PULMONARY & CRITICAL CARE MEDICINE  History of Present Illness    Ms. Hays is a 78 year old female with a medical history significant for stroke, CHF, CKD, diabetes, GERD, hyperlipidemia, hypertension,non-small cell lung cancer, and sleep apnea.    She presents today for follow up on COPD.  She reports that her breathing has been doing well.  She states that she is using Breztri BID, albuterol inhaler as needed and breathing treatments as needed.  She does continue to follow with oncology.  She continues treatments for lung cancer.  She is using supplemental oxygen at night.  She has been unable to tolerate cpap in the past.    Objective   Vital Signs:  /66   Pulse 84   Temp 98.1 °F (36.7 °C)   Ht 167.6 cm (65.98\")   Wt 101 kg (222 lb)   SpO2 (!) 89%   BMI 35.85 kg/m²   Estimated body mass index is 35.85 kg/m² as calculated from the following:    Height as of this encounter: 167.6 cm (65.98\").    Weight as of this encounter: 101 kg (222 lb).               Physical Exam     GENERAL APPEARANCE: Well developed, well nourished, alert and cooperative, and appears to be in no acute distress.    HEAD: normocephalic. Atraumatic.    EYES: PERRL, EOMI. Vision is grossly intact.    THROAT: Oral cavity and pharynx normal. No inflammation, swelling, exudate, or lesions.     NECK: Neck supple.  No thyromegaly.    CARDIAC: Normal S1 and S2. No S3, S4 or murmurs. Rhythm is regular.     RESPIRATORY:Bilateral air entry positive. Bilateral diminished breath sounds. No wheezing, crackles or rhonchi noted.    GI: Positive bowel sounds. Soft, nondistended, nontender.     MUSCULOSKELETAL: No significant deformity or joint abnormality. No edema. Peripheral pulses intact. No varicosities.    NEUROLOGICAL: Strength and sensation symmetric and intact throughout.     PSYCHIATRIC: The mental examination revealed the patient was " oriented to person, place, and time.     Result Review :    The following data was reviewed by: ERASTO Mckeon on 02/01/2024:  Common labs          1/4/2024    03:12 1/11/2024    12:29 1/26/2024    13:14   Common Labs   Glucose 296  123  169    BUN 30  14  11    Creatinine 1.04  0.84  0.90    Sodium 139  139  142    Potassium 4.2  3.8  3.5    Chloride 103  104  105    Calcium 9.5  9.2  9.3    Albumin  3.3  4.0    Total Bilirubin  0.4  0.3    Alkaline Phosphatase  104  121    AST (SGOT)  11  15    ALT (SGPT)  10  12    WBC 12.06  9.08  4.46    Hemoglobin 10.6  11.1  10.9    Hematocrit 36.2  35.5  35.4    Platelets 299  277  166                   Assessment and Plan     Diagnoses and all orders for this visit:    1. Chronic obstructive pulmonary disease, unspecified COPD type (Primary)  -     Budeson-Glycopyrrol-Formoterol (Breztri Aerosphere) 160-9-4.8 MCG/ACT aerosol inhaler; Inhale 2 puffs 2 (Two) Times a Day.  Dispense: 10.7 g; Refill: 8    2. Chronic respiratory failure with hypoxia    3. Former smoker    4. Non-small cell cancer of right lung    5. Class 2 severe obesity due to excess calories with serious comorbidity and body mass index (BMI) of 35.0 to 35.9 in adult    Other orders  -     albuterol sulfate  (90 Base) MCG/ACT inhaler; Inhale 2 puffs Every 4 (Four) Hours As Needed for Wheezing.  Dispense: 18 g; Refill: 5            Continue albuterol inhaler as needed.  Continue duonebs as needed.  Continue Breztri BID.    She continues to use supplemental oxygen at night and as needed.  We attempted to order her a POC, but insurance requires her to use 8 tanks a month for 3 consecutive months before they will pay for this.    She is following with oncology for non-small cell carcinoma.  She is currently on carboplatin and Durvalumab.  Further imaging recommendation per oncology.        Sent in refills for inhalers.        Follow Up     Return in about 6 months (around 8/1/2024).  Patient was  given instructions and counseling regarding her condition or for health maintenance advice. Please see specific information pulled into the AVS if appropriate.

## 2024-02-08 ENCOUNTER — LAB (OUTPATIENT)
Dept: ONCOLOGY | Facility: HOSPITAL | Age: 79
End: 2024-02-08
Payer: MEDICARE

## 2024-02-08 ENCOUNTER — OFFICE VISIT (OUTPATIENT)
Dept: ONCOLOGY | Facility: CLINIC | Age: 79
End: 2024-02-08
Payer: MEDICARE

## 2024-02-08 ENCOUNTER — INFUSION (OUTPATIENT)
Dept: ONCOLOGY | Facility: HOSPITAL | Age: 79
End: 2024-02-08
Payer: MEDICARE

## 2024-02-08 VITALS
SYSTOLIC BLOOD PRESSURE: 153 MMHG | OXYGEN SATURATION: 94 % | RESPIRATION RATE: 18 BRPM | DIASTOLIC BLOOD PRESSURE: 68 MMHG | HEART RATE: 74 BPM | TEMPERATURE: 97.9 F

## 2024-02-08 VITALS
RESPIRATION RATE: 18 BRPM | SYSTOLIC BLOOD PRESSURE: 153 MMHG | TEMPERATURE: 97.9 F | OXYGEN SATURATION: 94 % | DIASTOLIC BLOOD PRESSURE: 68 MMHG | HEART RATE: 74 BPM

## 2024-02-08 DIAGNOSIS — C34.91 NON-SMALL CELL CANCER OF RIGHT LUNG: ICD-10-CM

## 2024-02-08 DIAGNOSIS — Z95.828 PORT-A-CATH IN PLACE: ICD-10-CM

## 2024-02-08 DIAGNOSIS — C34.91 NON-SMALL CELL CANCER OF RIGHT LUNG: Primary | ICD-10-CM

## 2024-02-08 DIAGNOSIS — C34.11 PRIMARY CANCER OF RIGHT UPPER LOBE OF LUNG: ICD-10-CM

## 2024-02-08 DIAGNOSIS — C34.11 PRIMARY CANCER OF RIGHT UPPER LOBE OF LUNG: Primary | ICD-10-CM

## 2024-02-08 DIAGNOSIS — D50.9 IRON DEFICIENCY ANEMIA, UNSPECIFIED IRON DEFICIENCY ANEMIA TYPE: ICD-10-CM

## 2024-02-08 LAB
ALBUMIN SERPL-MCNC: 4.1 G/DL (ref 3.5–5.2)
ALBUMIN/GLOB SERPL: 1.4 G/DL
ALP SERPL-CCNC: 109 U/L (ref 39–117)
ALT SERPL W P-5'-P-CCNC: 11 U/L (ref 1–33)
ANION GAP SERPL CALCULATED.3IONS-SCNC: 7.4 MMOL/L (ref 5–15)
AST SERPL-CCNC: 12 U/L (ref 1–32)
BASOPHILS # BLD AUTO: 0.05 10*3/MM3 (ref 0–0.2)
BASOPHILS NFR BLD AUTO: 0.9 % (ref 0–1.5)
BILIRUB SERPL-MCNC: 0.4 MG/DL (ref 0–1.2)
BUN SERPL-MCNC: 14 MG/DL (ref 8–23)
BUN/CREAT SERPL: 16.3 (ref 7–25)
CALCIUM SPEC-SCNC: 9.7 MG/DL (ref 8.6–10.5)
CHLORIDE SERPL-SCNC: 103 MMOL/L (ref 98–107)
CO2 SERPL-SCNC: 29.6 MMOL/L (ref 22–29)
CREAT SERPL-MCNC: 0.86 MG/DL (ref 0.57–1)
DEPRECATED RDW RBC AUTO: 46.4 FL (ref 37–54)
EGFRCR SERPLBLD CKD-EPI 2021: 69.7 ML/MIN/1.73
EOSINOPHIL # BLD AUTO: 0.18 10*3/MM3 (ref 0–0.4)
EOSINOPHIL NFR BLD AUTO: 3.3 % (ref 0.3–6.2)
ERYTHROCYTE [DISTWIDTH] IN BLOOD BY AUTOMATED COUNT: 14.2 % (ref 12.3–15.4)
GLOBULIN UR ELPH-MCNC: 3 GM/DL
GLUCOSE SERPL-MCNC: 188 MG/DL (ref 65–99)
HCT VFR BLD AUTO: 36.9 % (ref 34–46.6)
HGB BLD-MCNC: 11.8 G/DL (ref 12–15.9)
IMM GRANULOCYTES # BLD AUTO: 0.03 10*3/MM3 (ref 0–0.05)
IMM GRANULOCYTES NFR BLD AUTO: 0.5 % (ref 0–0.5)
LYMPHOCYTES # BLD AUTO: 0.96 10*3/MM3 (ref 0.7–3.1)
LYMPHOCYTES NFR BLD AUTO: 17.4 % (ref 19.6–45.3)
MCH RBC QN AUTO: 28.6 PG (ref 26.6–33)
MCHC RBC AUTO-ENTMCNC: 32 G/DL (ref 31.5–35.7)
MCV RBC AUTO: 89.3 FL (ref 79–97)
MONOCYTES # BLD AUTO: 0.36 10*3/MM3 (ref 0.1–0.9)
MONOCYTES NFR BLD AUTO: 6.5 % (ref 5–12)
NEUTROPHILS NFR BLD AUTO: 3.95 10*3/MM3 (ref 1.7–7)
NEUTROPHILS NFR BLD AUTO: 71.4 % (ref 42.7–76)
NRBC BLD AUTO-RTO: 0 /100 WBC (ref 0–0.2)
PLATELET # BLD AUTO: 187 10*3/MM3 (ref 140–450)
PMV BLD AUTO: 10.4 FL (ref 6–12)
POTASSIUM SERPL-SCNC: 3.9 MMOL/L (ref 3.5–5.2)
PROT SERPL-MCNC: 7.1 G/DL (ref 6–8.5)
RBC # BLD AUTO: 4.13 10*6/MM3 (ref 3.77–5.28)
SODIUM SERPL-SCNC: 140 MMOL/L (ref 136–145)
WBC NRBC COR # BLD AUTO: 5.53 10*3/MM3 (ref 3.4–10.8)

## 2024-02-08 PROCEDURE — 25810000003 SODIUM CHLORIDE 0.9 % SOLUTION: Performed by: INTERNAL MEDICINE

## 2024-02-08 PROCEDURE — 1160F RVW MEDS BY RX/DR IN RCRD: CPT | Performed by: NURSE PRACTITIONER

## 2024-02-08 PROCEDURE — 25010000002 DURVALUMAB 500 MG/10ML SOLUTION 10 ML VIAL: Performed by: INTERNAL MEDICINE

## 2024-02-08 PROCEDURE — 85025 COMPLETE CBC W/AUTO DIFF WBC: CPT

## 2024-02-08 PROCEDURE — 25010000002 HEPARIN LOCK FLUSH PER 10 UNITS: Performed by: NURSE PRACTITIONER

## 2024-02-08 PROCEDURE — 1126F AMNT PAIN NOTED NONE PRSNT: CPT | Performed by: NURSE PRACTITIONER

## 2024-02-08 PROCEDURE — 25810000003 SODIUM CHLORIDE 0.9 % SOLUTION 250 ML FLEX CONT: Performed by: INTERNAL MEDICINE

## 2024-02-08 PROCEDURE — 3077F SYST BP >= 140 MM HG: CPT | Performed by: NURSE PRACTITIONER

## 2024-02-08 PROCEDURE — 1159F MED LIST DOCD IN RCRD: CPT | Performed by: NURSE PRACTITIONER

## 2024-02-08 PROCEDURE — 80053 COMPREHEN METABOLIC PANEL: CPT

## 2024-02-08 PROCEDURE — 99214 OFFICE O/P EST MOD 30 MIN: CPT | Performed by: NURSE PRACTITIONER

## 2024-02-08 PROCEDURE — 96413 CHEMO IV INFUSION 1 HR: CPT

## 2024-02-08 PROCEDURE — 3078F DIAST BP <80 MM HG: CPT | Performed by: NURSE PRACTITIONER

## 2024-02-08 RX ORDER — HEPARIN SODIUM (PORCINE) LOCK FLUSH IV SOLN 100 UNIT/ML 100 UNIT/ML
500 SOLUTION INTRAVENOUS AS NEEDED
OUTPATIENT
Start: 2024-02-08

## 2024-02-08 RX ORDER — HEPARIN SODIUM (PORCINE) LOCK FLUSH IV SOLN 100 UNIT/ML 100 UNIT/ML
300 SOLUTION INTRAVENOUS ONCE
OUTPATIENT
Start: 2024-02-08

## 2024-02-08 RX ORDER — SODIUM CHLORIDE 0.9 % (FLUSH) 0.9 %
10 SYRINGE (ML) INJECTION AS NEEDED
OUTPATIENT
Start: 2024-02-08

## 2024-02-08 RX ORDER — SODIUM CHLORIDE 0.9 % (FLUSH) 0.9 %
20 SYRINGE (ML) INJECTION AS NEEDED
OUTPATIENT
Start: 2024-02-08

## 2024-02-08 RX ORDER — HEPARIN SODIUM (PORCINE) LOCK FLUSH IV SOLN 100 UNIT/ML 100 UNIT/ML
500 SOLUTION INTRAVENOUS AS NEEDED
Status: DISCONTINUED | OUTPATIENT
Start: 2024-02-08 | End: 2024-02-08 | Stop reason: HOSPADM

## 2024-02-08 RX ORDER — SODIUM CHLORIDE 9 MG/ML
250 INJECTION, SOLUTION INTRAVENOUS ONCE
Status: COMPLETED | OUTPATIENT
Start: 2024-02-08 | End: 2024-02-08

## 2024-02-08 RX ORDER — SODIUM CHLORIDE 0.9 % (FLUSH) 0.9 %
10 SYRINGE (ML) INJECTION AS NEEDED
Status: DISCONTINUED | OUTPATIENT
Start: 2024-02-08 | End: 2024-02-08 | Stop reason: HOSPADM

## 2024-02-08 RX ADMIN — Medication 10 ML: at 14:59

## 2024-02-08 RX ADMIN — Medication 500 UNITS: at 14:59

## 2024-02-08 RX ADMIN — SODIUM CHLORIDE 1000 MG: 9 INJECTION, SOLUTION INTRAVENOUS at 13:57

## 2024-02-08 RX ADMIN — SODIUM CHLORIDE 250 ML: 9 INJECTION, SOLUTION INTRAVENOUS at 13:57

## 2024-02-08 NOTE — PROGRESS NOTES
Subjective     Date: 2024    Referring Provider  Shawna Bond MD    Chief Complaint  Symptomatic anemia   2. Non small Cell Lung Cancer  Cancer Staging   Stage IIIA (cT1c, cN2, cM0)        Subjective      Mayra Hays is a 78 y.o. female who presents today to [facility] for follow up.    HPI:   78 y.o. female with past medical history of diabetes mellitus type 2, hyperlipidemia, COPD, hypertension, atrial fibrillation on anticoagulation (Eliquis), h/o CVA and previous tobacco use presents for symptomatic anemia and NSCLC.    She is present today with her daughter, Hailey, who is assisting with history. Pt started work up for RUL lung nodule , since then noticed to have a decrease in her Hgb to 10.7 from normal in 2023. More recently, her Hgb 9 was 6.5, she was directed to ED where she received 1U PRBC.     Patient reports increased fatigue over the last several months. She reports no bleeding, however does report dark colored stool (melena) two days ago. Denies any other evidence of melena or hematochezia.     She has previously been diagnosed with iron deficiency (years ago) requiring oral iron supplements, but never received IV iron or bone marrow biopsy.    She and her daughter are unsure of her last colonoscopy and endoscopy history, think it may have occurred >5 years ago but unsure.    She denies recent weight loss, fever, chills,  night sweats.  Previous smoker, quit 5-6 years ago, smoked 3 packs/day X 30 years. Denies alcohol or drug use. Family history significant for son with leukemia, brothers with lung cancer.     Oncology History:  2023: low dose CT chest screenin.9 cm spiculated right upper lobe pulmonary nodule concerning for malignancy, PET/CT recommended.   2023: EBUS by Dr. Peter negative for malignancy for bronchial washing and FNA of station 10R  2023: PET/CT: Right upper lobe pulmonary nodule, more posterior  possible satellite nodules are postobstructive process measuring 2.1 cm with mSUV 14.5. Also with pretracheal region lymph node 2.6 cm.   6/29/2023: CT guided needle biopsy was non diagnostic, showed fibrosis and chronic inflammation.   8/9/2023: Lab work showed anemia with Hgb 6.5. Referred to ED. Bronchoscopy deferred.   9/6/2023: Navigational bronchoscopy performed by Dr. Clemens- Right upper lobe lung transbronchial biopsy revealed squamous cell carcinoma, lymph node at station 4R also involved with squamous cell carcinoma. PD-L1 TPS 60%. Patient not deemed a surgical candidate.  9/13-11/21/2023: Received weekly carboplatin  X 7 and radiation. Patient had a reaction to paclitaxel, it was omitted.   12/19/2023: Post treatment CT chest with improvement of masslike process RUL now 1.6 cm, suggest significant response to treatment. Mediastinal lymph nodes are now within normal limits of size.     Ms. Hays presents today with her daughter, grand daughter, and great grand daughters. She is in a wheelchair. She lives alone with her grand son, able to perform her ADLs including cooking, cleaning.     Treatment history:            Interval History 10/11/2023   Ms. Hays presents for follow up today, accompanied by her daughter in law. She started treatment with paclitaxel and carboplatin on 10/9, unfortunately had a anaphylactic reaction to taxol after 8 minutes of infusion causing her to go to the ED. Patient was awake and feeling back to herself at the time of ED visit. Carboplatin was not administered.     She reports doing well overall, no new symptoms. Started radiation therapy yesterday.      Interval History 10/24/2023   Ms. Hays presents for cycle 2 of carboplatin with concurrent radiation. Did well with first cycle. Denies any adverse effects including nausea, vomiting, diarrhea, neuropathy. Appetite is good. Radiation is going well. No complaints today.  Denies any skin rash or bites.    Interval History  11/21/2023   Ms. Hays presents today for cycle 7 of carboplatin with concurrent radiation. She reports good tolerance thus far without neuropathy, nausea, vomiting, diarrhea. Appetite is stable.   Denies any GIB.    Interval History 01/11/2024   Ms. Hays presents after completion of therapy. She was admitted to the hospital 12/31 due to shortness of breath, found to have Afib with RVR and coronavirus HKU. She received inpatient antibiotics and steroids. Currently with improvement, still reports some fatigue.   We reviewed her last CT chest, which shows improvement after treatment. Discussed continuing immunotherapy.      Interval History 02/08/2024  Ms. Hays presents today for follow up of lung cancer and toxicity check. Overall, she reports that she has been doing well since her last visit. She started maintenance Durvalumab on 01/26/2024 and reports tolerating the treatment well. She has ongoing shortness of breath but denies any worsening. No cough. She reports a good appetite, hydrating well. Denies nausea/vomiting/diarrhea. No rash. She denies any obvious blood loss from any source. She is without any specific complaints at this time.      Objective     Objective     Allergy:   Allergies   Allergen Reactions    Paclitaxel Anaphylaxis        Current Medications:   Current Outpatient Medications   Medication Sig Dispense Refill    albuterol sulfate  (90 Base) MCG/ACT inhaler Inhale 2 puffs Every 4 (Four) Hours As Needed for Wheezing. 18 g 5    apixaban (ELIQUIS) 5 MG tablet tablet Take 1 tablet by mouth Every 12 (Twelve) Hours. 180 tablet 3    atorvastatin (LIPITOR) 20 MG tablet Take 1 tablet by mouth Daily. 90 tablet 3    Budeson-Glycopyrrol-Formoterol (Breztri Aerosphere) 160-9-4.8 MCG/ACT aerosol inhaler Inhale 2 puffs 2 (Two) Times a Day. 10.7 g 8    hydrOXYzine pamoate (VISTARIL) 25 MG capsule Take 1 capsule by mouth Every Night.      insulin glargine (LANTUS, SEMGLEE) 100 UNIT/ML injection Inject  15 Units under the skin into the appropriate area as directed Daily. 1 mL 12    lidocaine-prilocaine (EMLA) 2.5-2.5 % cream Apply to port-a-cath site 30 minutes prior to arrival at infusion center. Cover with plastic wrap. 30 g 1    lisinopril (PRINIVIL,ZESTRIL) 5 MG tablet Take 1 tablet by mouth Daily. 90 tablet 3    metFORMIN (GLUCOPHAGE) 500 MG tablet Take 1 tablet by mouth 2 (Two) Times a Day As Needed (only takes if blood glucose is above 200).      metoprolol tartrate (LOPRESSOR) 25 MG tablet Take 0.5 tablets by mouth Every 12 (Twelve) Hours. 30 tablet 3    ondansetron (Zofran) 8 MG tablet Take 1 tablet by mouth Every 8 (Eight) Hours As Needed for Nausea or Vomiting. 30 tablet 1    pantoprazole (PROTONIX) 40 MG EC tablet Take 1 tablet by mouth Daily.      prochlorperazine (COMPAZINE) 10 MG tablet Take 1 tablet by mouth Every 6 (Six) Hours As Needed for Nausea or Vomiting. 30 tablet 1    spironolactone (ALDACTONE) 25 MG tablet Take 1 tablet by mouth Daily.       No current facility-administered medications for this visit.     Facility-Administered Medications Ordered in Other Visits   Medication Dose Route Frequency Provider Last Rate Last Admin    Durvalumab 1,000 mg in sodium chloride 0.9 % 270 mL chemo IVPB  1,000 mg Intravenous Once Shawna Bond  mL/hr at 02/08/24 1357 1,000 mg at 02/08/24 1357    heparin injection 500 Units  500 Units Intravenous PRN Bailee Gan APRN   500 Units at 01/26/24 1558    heparin injection 500 Units  500 Units Intravenous PRN Bailee Gan APRN        sodium chloride 0.9 % flush 10 mL  10 mL Intravenous PRN Bailee Gan APRN        sodium chloride 0.9 % flush 10 mL  10 mL Intravenous PRN Bailee Gan APRN           Past Medical History:  Past Medical History:   Diagnosis Date    Arthritis     CHF (congestive heart failure)     Chronic anticoagulation     Eliquis    Chronic kidney disease     stage II/IIIa    Collapsed lung     COPD  (chronic obstructive pulmonary disease)     Diabetes mellitus     TYPE 2    Elevated cholesterol     GERD (gastroesophageal reflux disease)     Hyperlipidemia     Hypertension     Non-small cell lung cancer RUL     Sleep apnea     non compliant with CPAP    Stroke 2019    Wears eyeglasses        Past Surgical History:  Past Surgical History:   Procedure Laterality Date    BRONCHOSCOPY Bilateral 2023    Procedure: BRONCHOSCOPY WITH ENDOBRONCHIAL ULTRASOUND;  Surgeon: Abdulkadir Peter MD;  Location: [facility] OR;  Service: Pulmonary;  Laterality: Bilateral;    BRONCHOSCOPY WITH ION ROBOTIC ASSIST N/A 2023    Procedure: BRONCHOSCOPY WITH ION ROBOT AND EBUS;  Surgeon: John Clemens MD;  Location:  [facility];  Service: Robotics - Pulmonary;  Laterality: N/A;  Ion cath #6 - 0032,  #6 - 0030, cath guide # 0077. EBUS scope removed with balloon intact.    CARDIAC CATHETERIZATION N/A 2017    Procedure: Left Heart Cath;  Surgeon: Jatinder Matias MD;  [facility];  Service:     CARDIAC CATHETERIZATION N/A 2021    Procedure: Left Heart Cath;  Surgeon: Tolu Steinberg MD;  Location:  [facility];  Service: Cardiology;  Laterality: N/A;    COLONOSCOPY      ENDOSCOPY      GALLBLADDER SURGERY      PORTACATH PLACEMENT N/A 2023    Procedure: INSERTION OF PORTACATH;  Surgeon: Petr Velásquez MD;  Location: [facility] OR;  Service: General;  Laterality: N/A;    VENTRAL HERNIA REPAIR N/A 2020    Procedure: VENTRAL HERNIA REPAIR LAPAROSCOPIC WITH DAVINCI ROBOT;  Surgeon: Petr Velásquez MD;  Location: [facility] OR;  Service: DaVinci;  Laterality: N/A;       Social History:  Social History     Socioeconomic History    Marital status: Single    Number of children: 6   Tobacco Use    Smoking status: Former     Packs/day: 3     Types: Cigarettes     Start date: 4/3/1998     Quit date: 2015     Years since quittin.1     Passive exposure:  Past    Smokeless tobacco: Never   Vaping Use    Vaping Use: Never used   Substance and Sexual Activity    Alcohol use: No    Drug use: No    Sexual activity: Never         Family History:  Family History   Problem Relation Age of Onset    Heart disease Mother         Rhianna sophia    Heart disease Father     Heart attack Father     Heart failure Father        Review of Systems:  Review of Systems   Respiratory:  Positive for shortness of breath. Negative for cough.    All other systems reviewed and are negative.      Vital Signs:   /68   Pulse 74   Temp 97.9 °F (36.6 °C) (Temporal)   Resp 18   SpO2 94%      Physical Exam:  Physical Exam  Vitals reviewed.   Constitutional:       General: She is not in acute distress.     Appearance: Normal appearance. She is obese. She is not ill-appearing.      Comments: +hard of hearing   HENT:      Head: Normocephalic and atraumatic.      Mouth/Throat:      Mouth: Mucous membranes are moist.      Pharynx: Oropharynx is clear.   Eyes:      Conjunctiva/sclera: Conjunctivae normal.      Pupils: Pupils are equal, round, and reactive to light.   Cardiovascular:      Rate and Rhythm: Normal rate and regular rhythm.      Heart sounds: No murmur heard.  Pulmonary:      Effort: Pulmonary effort is normal. No respiratory distress.      Breath sounds: Normal breath sounds. No wheezing.   Abdominal:      General: Abdomen is flat. Bowel sounds are normal. There is no distension.      Palpations: Abdomen is soft. There is no mass.      Tenderness: There is no abdominal tenderness. There is no guarding.   Musculoskeletal:         General: No swelling. Normal range of motion.      Cervical back: Normal range of motion.   Lymphadenopathy:      Cervical: No cervical adenopathy.   Skin:     General: Skin is warm and dry.   Neurological:      General: No focal deficit present.      Mental Status: She is alert and oriented to person, place, and time. Mental status is at baseline.    Psychiatric:         Mood and Affect: Mood normal.         PHQ-9 Score  PHQ-9 Total Score:       Pain Score  Vitals:    02/08/24 1301   BP: 153/68   Pulse: 74   Resp: 18   Temp: 97.9 °F (36.6 °C)   TempSrc: Temporal   SpO2: 94%   PainSc: 0-No pain                 PAINSCOREQUALITYMETRIC:   Vitals:    02/08/24 1301   PainSc: 0-No pain                  Lab Review  Lab Results   Component Value Date    WBC 5.53 02/08/2024    HGB 11.8 (L) 02/08/2024    HCT 36.9 02/08/2024    MCV 89.3 02/08/2024    RDW 14.2 02/08/2024     02/08/2024    NEUTRORELPCT 71.4 02/08/2024    LYMPHORELPCT 17.4 (L) 02/08/2024    MONORELPCT 6.5 02/08/2024    EOSRELPCT 3.3 02/08/2024    BASORELPCT 0.9 02/08/2024    NEUTROABS 3.95 02/08/2024    LYMPHSABS 0.96 02/08/2024     Lab Results   Component Value Date     02/08/2024    K 3.9 02/08/2024    CO2 29.6 (H) 02/08/2024     02/08/2024    BUN 14 02/08/2024    CREATININE 0.86 02/08/2024    EGFRIFNONA 51 (L) 11/22/2021    GLUCOSE 188 (H) 02/08/2024    CALCIUM 9.7 02/08/2024    ALKPHOS 109 02/08/2024    AST 12 02/08/2024    ALT 11 02/08/2024    BILITOT 0.4 02/08/2024    ALBUMIN 4.1 02/08/2024    PROTEINTOT 7.1 02/08/2024    MG 2.2 01/03/2024    PHOS 3.5 05/18/2020     Lab Results   Component Value Date    RETICCTPCT 4.26 (H) 08/16/2023     Lab Results   Component Value Date    FERRITIN 542.80 (H) 01/11/2024    IRON 70 01/11/2024    TIBC 261 (L) 01/11/2024    LABIRON 27 01/11/2024    [id number] 548 08/14/2023    FOLATE 12.90 08/14/2023        Radiology Results  CT Chest Without Contrast Diagnostic (12/31/2023 20:30)   IMPRESSION:  Spiculated nodule of the right upper lobe concerning for neoplasm.  Masslike consolidation in the right lower lobe could represent pneumonia  with underlying mass not excluded. Atelectasis/airspace disease in the  left lower lobe and lingula. Mediastinal adenopathy. Follow-up  recommended.    CT Chest With Contrast Diagnostic (12/19/2023 14:47)      IMPRESSION:  1.  Significant decrease size and masslike appearance of process in the  right upper lobe which would suggest significant response to treatment.  2.  No suspicious pulmonary nodules or masses identified.  3.  Mediastinal lymph nodes are now within normal limits for size.  No  hilar or mediastinal adenopathy.  4.  Mild centrilobular nodularity of the left lower lobe which may  reflect changes of atypical pneumonia or aspiration pneumonitis.  5.  Stable cardiomegaly.  6.  Other incidental/nonacute findings above.      CT Angiogram Chest Pulmonary Embolism (10/09/2023 16:41)   MPRESSION:  1.  Right upper lobe mass in association with pneumonia has increased in  size and extent when compared to the previous exam with extension to the  pleural surface. Masslike component is approximately 56.8 x 44.5 mm and  was previously 40.1 x 36.7 mm. This would correspond to primary  malignancy.  2.  Central bronchial wall thickening. Can be seen with reactive airway  disease or bronchitis.  3.  1.3 cm left adrenal nodule is noted. No significant change from  previous.  4.  Increased size of paratracheal and mediastinal lymph nodes. A right  paratracheal lymph node is 2.5 cm and was previously 2.2 cm.  5. No PE identified.  6. Marked cardiomegaly, stable.  7. Otherwise stable chest with other nonacute/incidental findings as  above.    CT Head Without Contrast (10/09/2023 12:00)   IMPRESSION:    Unremarkable exam demonstrating no CT evidence of acute intracranial  findings.      CT Chest Without Contrast Diagnostic (08/09/2023 12:38)       NM PET/CT Skull Base to Mid Thigh (06/20/2023 11:12)         CT Chest Low Dose Cancer Screening WO (02/14/2023 12:58)   IMPRESSION:    Interval development or enlargement of a 1.9 cm spiculated right upper  lobe pulmonary nodule concerning in appearance for malignancy and PET/CT  is recommended for further evaluation.        Pathology:     Tissue Pathology Exam (09/06/2023 08:13)          6/29/23           Assessment / Plan         Assessment and Plan   Mayra Hays is a 78 y.o. year old presents for       Non small cell lung cancer   Cancer Staging   Stage IIIA (cT1c, cN2, cM0)  -Oncology history as above. Patient with 1.9 cm spiculated right upper lobe nodule on low dose CT chest screen (2/2023). EBUS by Dr. Peter negative for malignancy (2/2023). PET/CT with hypermetabolism RUL 2.1 cm and pretracheal region lymph node (6/2023). CT guided biopsy negative for malignancy (6/2023). Navigational bronchoscopy with positive RUL and  4R (paratracheal) for squamous cell (9/6/2023). PD-L1 TPS score 60%.   -Patient not deemed for surgical resection. We reviewed her staging and treatment options which include concurrent chemo-radiation followed by adjuvant immunotherapy. Chemotherapy agents to be used would include weekly carboplatin AUC2 and paclitaxel 50 mg/m2. Chemo will be followed by one year of Durvalumab based on PACIFIC trial.   -Patient experienced anaphylaxis 8 minutes into the paclitaxel on first cycle 10/9. Due to this, paclitaxel will be omitted from weekly chemotherapy.  -Radiation therapy 10/10; 6000 cGy in 30 treatment fractions. Complete 11/21/2023  -Weekly carboplatin with concurrent radiation 10/18-11/21/2023  -Post treatment CT 12/19/23 with good response  -Cont maintenance durvalumab every 2 weeks  -Cycle 1 01/26- tolerated well  -Cycle 2 02/08-proceed today      2. Anemia; suspicious for iron deficiency anemia due to GIB  - Patient with normal H/H in our system 2/2023, with acute drop in Hgb 6/29 (10.7) to Hgb (6.5) on 8/9 requiring transfusion. Patient reports one episode of melena two days ago (she is a poor historian).   -Last colonoscopy and endoscopy are unknown; think it may have been >5 years ago  -CBC from 8/14 show Hgb 8.2, Hct 27, MCV 90. Normal WBC and platelet count. Iron studies with normal iron (37), TIBC (435), and low iron saturation (9). This is in light of  recent blood transfusion. Normal folate and B12 level. Reticulocyte count noted to be elevated to 6%  -Patient is with fatigue. Denies shortness of breath (aside from baseline COPD) or chest pain. Continues to be on Eliquis for Afib  -Initial work up from consultation confirmed iron deficiency anemia, normal folate/b12 levels. No indication of hemolysis seen with normal LDH and haptoglobin. Myeloma work up has been negative with no M spike on serum protein electrophoresis and normal k/l free light chain ratio. Peripheral smear with normal total WBC without granulocytic dysplasia or blasts.  -Received Ferumoxytol 8/29/2023 and 11/27/23  -Pt to see surgery post treatment for repeat EGD and Colonoscopy     3. Malignancy associated pain  - Currently denies        Discussed possible differential diagnoses, testing, treatment, recommended non-pharmacological interventions, risks, warning signs to monitor for that would indicate need for follow-up in clinic or ER. If no improvement with these regimens or you have new or worsening symptoms follow-up. Patient verbalizes understanding and agreement with plan of care. Denies further needs or concerns.     Patient was given instructions and counseling regarding her condition and for health maintenance advised.       All questions were answered to her satisfaction.              Meds ordered during this visit  No orders of the defined types were placed in this encounter.      Visit Diagnoses    ICD-10-CM ICD-9-CM   1. Non-small cell cancer of right lung  C34.91 162.9   2. Iron deficiency anemia, unspecified iron deficiency anemia type  D50.9 280.9                 Follow Up   With MD on day of next cycle.      I spent 30 minutes caring for Mayra on this date of service. This time includes time spent by me in the following activities: preparing for the visit, reviewing tests, obtaining and/or reviewing a separately obtained history, performing a medically appropriate examination  and/or evaluation, counseling and educating the patient/family/caregiver, ordering medications, tests, or procedures, documenting information in the medical record, independently interpreting results and communicating that information with the patient/family/caregiver, and care coordination.           This document has been electronically signed by ERASTO Lyons   February 8, 2024 14:22 EST

## 2024-02-20 DIAGNOSIS — J44.9 CHRONIC OBSTRUCTIVE PULMONARY DISEASE, UNSPECIFIED COPD TYPE: ICD-10-CM

## 2024-02-20 RX ORDER — IPRATROPIUM BROMIDE AND ALBUTEROL SULFATE 2.5; .5 MG/3ML; MG/3ML
SOLUTION RESPIRATORY (INHALATION)
Qty: 320 ML | Refills: 11 | Status: SHIPPED | OUTPATIENT
Start: 2024-02-20

## 2024-02-22 ENCOUNTER — INFUSION (OUTPATIENT)
Dept: ONCOLOGY | Facility: HOSPITAL | Age: 79
End: 2024-02-22
Payer: MEDICARE

## 2024-02-22 ENCOUNTER — LAB (OUTPATIENT)
Dept: ONCOLOGY | Facility: HOSPITAL | Age: 79
End: 2024-02-22
Payer: MEDICARE

## 2024-02-22 ENCOUNTER — OFFICE VISIT (OUTPATIENT)
Dept: ONCOLOGY | Facility: CLINIC | Age: 79
End: 2024-02-22
Payer: MEDICARE

## 2024-02-22 VITALS
BODY MASS INDEX: 36.01 KG/M2 | OXYGEN SATURATION: 92 % | RESPIRATION RATE: 18 BRPM | HEART RATE: 83 BPM | WEIGHT: 223 LBS | SYSTOLIC BLOOD PRESSURE: 134 MMHG | TEMPERATURE: 98 F | DIASTOLIC BLOOD PRESSURE: 67 MMHG

## 2024-02-22 VITALS
DIASTOLIC BLOOD PRESSURE: 67 MMHG | OXYGEN SATURATION: 92 % | SYSTOLIC BLOOD PRESSURE: 134 MMHG | HEART RATE: 83 BPM | WEIGHT: 223 LBS | RESPIRATION RATE: 18 BRPM | BODY MASS INDEX: 36.01 KG/M2 | TEMPERATURE: 98 F

## 2024-02-22 DIAGNOSIS — D50.9 IRON DEFICIENCY ANEMIA, UNSPECIFIED IRON DEFICIENCY ANEMIA TYPE: ICD-10-CM

## 2024-02-22 DIAGNOSIS — R79.89 ELEVATED SERUM CREATININE: ICD-10-CM

## 2024-02-22 DIAGNOSIS — C34.11 PRIMARY CANCER OF RIGHT UPPER LOBE OF LUNG: ICD-10-CM

## 2024-02-22 DIAGNOSIS — C34.11 PRIMARY CANCER OF RIGHT UPPER LOBE OF LUNG: Primary | ICD-10-CM

## 2024-02-22 DIAGNOSIS — C34.91 NON-SMALL CELL CANCER OF RIGHT LUNG: Primary | ICD-10-CM

## 2024-02-22 DIAGNOSIS — R53.83 OTHER FATIGUE: ICD-10-CM

## 2024-02-22 DIAGNOSIS — C34.91 NON-SMALL CELL CANCER OF RIGHT LUNG: ICD-10-CM

## 2024-02-22 DIAGNOSIS — Z95.828 PORT-A-CATH IN PLACE: ICD-10-CM

## 2024-02-22 DIAGNOSIS — K90.49 MALABSORPTION DUE TO INTOLERANCE, NOT ELSEWHERE CLASSIFIED: ICD-10-CM

## 2024-02-22 LAB
ALBUMIN SERPL-MCNC: 4.3 G/DL (ref 3.5–5.2)
ALBUMIN/GLOB SERPL: 1.5 G/DL
ALP SERPL-CCNC: 132 U/L (ref 39–117)
ALT SERPL W P-5'-P-CCNC: 9 U/L (ref 1–33)
ANION GAP SERPL CALCULATED.3IONS-SCNC: 10.2 MMOL/L (ref 5–15)
AST SERPL-CCNC: 12 U/L (ref 1–32)
BASOPHILS # BLD AUTO: 0.05 10*3/MM3 (ref 0–0.2)
BASOPHILS NFR BLD AUTO: 0.7 % (ref 0–1.5)
BILIRUB SERPL-MCNC: 0.2 MG/DL (ref 0–1.2)
BUN SERPL-MCNC: 19 MG/DL (ref 8–23)
BUN/CREAT SERPL: 19 (ref 7–25)
CALCIUM SPEC-SCNC: 9.4 MG/DL (ref 8.6–10.5)
CHLORIDE SERPL-SCNC: 104 MMOL/L (ref 98–107)
CO2 SERPL-SCNC: 26.8 MMOL/L (ref 22–29)
CREAT SERPL-MCNC: 1 MG/DL (ref 0.57–1)
DEPRECATED RDW RBC AUTO: 43 FL (ref 37–54)
EGFRCR SERPLBLD CKD-EPI 2021: 58.1 ML/MIN/1.73
EOSINOPHIL # BLD AUTO: 0.18 10*3/MM3 (ref 0–0.4)
EOSINOPHIL NFR BLD AUTO: 2.5 % (ref 0.3–6.2)
ERYTHROCYTE [DISTWIDTH] IN BLOOD BY AUTOMATED COUNT: 13.1 % (ref 12.3–15.4)
GLOBULIN UR ELPH-MCNC: 2.9 GM/DL
GLUCOSE SERPL-MCNC: 189 MG/DL (ref 65–99)
HCT VFR BLD AUTO: 35.6 % (ref 34–46.6)
HGB BLD-MCNC: 11.3 G/DL (ref 12–15.9)
IMM GRANULOCYTES # BLD AUTO: 0.03 10*3/MM3 (ref 0–0.05)
IMM GRANULOCYTES NFR BLD AUTO: 0.4 % (ref 0–0.5)
LYMPHOCYTES # BLD AUTO: 0.98 10*3/MM3 (ref 0.7–3.1)
LYMPHOCYTES NFR BLD AUTO: 13.7 % (ref 19.6–45.3)
MCH RBC QN AUTO: 28.7 PG (ref 26.6–33)
MCHC RBC AUTO-ENTMCNC: 31.7 G/DL (ref 31.5–35.7)
MCV RBC AUTO: 90.4 FL (ref 79–97)
MONOCYTES # BLD AUTO: 0.67 10*3/MM3 (ref 0.1–0.9)
MONOCYTES NFR BLD AUTO: 9.4 % (ref 5–12)
NEUTROPHILS NFR BLD AUTO: 5.23 10*3/MM3 (ref 1.7–7)
NEUTROPHILS NFR BLD AUTO: 73.3 % (ref 42.7–76)
NRBC BLD AUTO-RTO: 0 /100 WBC (ref 0–0.2)
PLATELET # BLD AUTO: 203 10*3/MM3 (ref 140–450)
PMV BLD AUTO: 10.6 FL (ref 6–12)
POTASSIUM SERPL-SCNC: 4 MMOL/L (ref 3.5–5.2)
PROT SERPL-MCNC: 7.2 G/DL (ref 6–8.5)
RBC # BLD AUTO: 3.94 10*6/MM3 (ref 3.77–5.28)
SODIUM SERPL-SCNC: 141 MMOL/L (ref 136–145)
WBC NRBC COR # BLD AUTO: 7.14 10*3/MM3 (ref 3.4–10.8)

## 2024-02-22 PROCEDURE — 80053 COMPREHEN METABOLIC PANEL: CPT | Performed by: INTERNAL MEDICINE

## 2024-02-22 PROCEDURE — 85025 COMPLETE CBC W/AUTO DIFF WBC: CPT | Performed by: INTERNAL MEDICINE

## 2024-02-22 PROCEDURE — 25010000002 HEPARIN LOCK FLUSH PER 10 UNITS: Performed by: NURSE PRACTITIONER

## 2024-02-22 PROCEDURE — 25010000002 DURVALUMAB 500 MG/10ML SOLUTION 10 ML VIAL: Performed by: INTERNAL MEDICINE

## 2024-02-22 PROCEDURE — 25810000003 SODIUM CHLORIDE 0.9 % SOLUTION 250 ML FLEX CONT: Performed by: INTERNAL MEDICINE

## 2024-02-22 PROCEDURE — 25810000003 SODIUM CHLORIDE 0.9 % SOLUTION: Performed by: INTERNAL MEDICINE

## 2024-02-22 PROCEDURE — 96413 CHEMO IV INFUSION 1 HR: CPT

## 2024-02-22 RX ORDER — HEPARIN SODIUM (PORCINE) LOCK FLUSH IV SOLN 100 UNIT/ML 100 UNIT/ML
300 SOLUTION INTRAVENOUS ONCE
OUTPATIENT
Start: 2024-02-22

## 2024-02-22 RX ORDER — HEPARIN SODIUM (PORCINE) LOCK FLUSH IV SOLN 100 UNIT/ML 100 UNIT/ML
500 SOLUTION INTRAVENOUS AS NEEDED
OUTPATIENT
Start: 2024-02-22

## 2024-02-22 RX ORDER — SODIUM CHLORIDE 9 MG/ML
250 INJECTION, SOLUTION INTRAVENOUS ONCE
Status: COMPLETED | OUTPATIENT
Start: 2024-02-22 | End: 2024-02-22

## 2024-02-22 RX ORDER — HEPARIN SODIUM (PORCINE) LOCK FLUSH IV SOLN 100 UNIT/ML 100 UNIT/ML
500 SOLUTION INTRAVENOUS AS NEEDED
Status: DISCONTINUED | OUTPATIENT
Start: 2024-02-22 | End: 2024-02-22 | Stop reason: HOSPADM

## 2024-02-22 RX ORDER — SODIUM CHLORIDE 0.9 % (FLUSH) 0.9 %
10 SYRINGE (ML) INJECTION AS NEEDED
Status: DISCONTINUED | OUTPATIENT
Start: 2024-02-22 | End: 2024-02-22 | Stop reason: HOSPADM

## 2024-02-22 RX ORDER — SODIUM CHLORIDE 9 MG/ML
250 INJECTION, SOLUTION INTRAVENOUS ONCE
OUTPATIENT
Start: 2024-03-07

## 2024-02-22 RX ORDER — SODIUM CHLORIDE 0.9 % (FLUSH) 0.9 %
20 SYRINGE (ML) INJECTION AS NEEDED
OUTPATIENT
Start: 2024-02-22

## 2024-02-22 RX ORDER — SODIUM CHLORIDE 0.9 % (FLUSH) 0.9 %
10 SYRINGE (ML) INJECTION AS NEEDED
OUTPATIENT
Start: 2024-02-22

## 2024-02-22 RX ADMIN — Medication 10 ML: at 15:35

## 2024-02-22 RX ADMIN — SODIUM CHLORIDE 1000 MG: 9 INJECTION, SOLUTION INTRAVENOUS at 14:25

## 2024-02-22 RX ADMIN — Medication 500 UNITS: at 15:35

## 2024-02-22 RX ADMIN — SODIUM CHLORIDE 250 ML: 9 INJECTION, SOLUTION INTRAVENOUS at 14:25

## 2024-02-22 NOTE — PROGRESS NOTES
Subjective     Date: 2024    Referring Provider  No ref. provider found    Chief Complaint  Symptomatic anemia   2. Non small Cell Lung Cancer  Cancer Staging   Stage IIIA (cT1c, cN2, cM0)        Subjective      Mayra Hays is a 78 y.o. female who presents today to [facility] for follow up.    HPI:   78 y.o. female with past medical history of diabetes mellitus type 2, hyperlipidemia, COPD, hypertension, atrial fibrillation on anticoagulation (Eliquis), h/o CVA and previous tobacco use presents for symptomatic anemia and NSCLC.    She is present today with her daughter, Hailey, who is assisting with history. Pt started work up for RUL lung nodule , since then noticed to have a decrease in her Hgb to 10.7 from normal in 2023. More recently, her Hgb 8/9 was 6.5, she was directed to ED where she received 1U PRBC.     Patient reports increased fatigue over the last several months. She reports no bleeding, however does report dark colored stool (melena) two days ago. Denies any other evidence of melena or hematochezia.     She has previously been diagnosed with iron deficiency (years ago) requiring oral iron supplements, but never received IV iron or bone marrow biopsy.    She and her daughter are unsure of her last colonoscopy and endoscopy history, think it may have occurred >5 years ago but unsure.    She denies recent weight loss, fever, chills,  night sweats.  Previous smoker, quit 5-6 years ago, smoked 3 packs/day X 30 years. Denies alcohol or drug use. Family history significant for son with leukemia, brothers with lung cancer.     Oncology History:  2023: low dose CT chest screenin.9 cm spiculated right upper lobe pulmonary nodule concerning for malignancy, PET/CT recommended.   2023: EBUS by Dr. Peter negative for malignancy for bronchial washing and FNA of station 10R  2023: PET/CT: Right upper lobe pulmonary nodule, more  posterior possible satellite nodules are postobstructive process measuring 2.1 cm with mSUV 14.5. Also with pretracheal region lymph node 2.6 cm.   6/29/2023: CT guided needle biopsy was non diagnostic, showed fibrosis and chronic inflammation.   8/9/2023: Lab work showed anemia with Hgb 6.5. Referred to ED. Bronchoscopy deferred.   9/6/2023: Navigational bronchoscopy performed by Dr. Clemens- Right upper lobe lung transbronchial biopsy revealed squamous cell carcinoma, lymph node at station 4R also involved with squamous cell carcinoma. PD-L1 TPS 60%. Patient not deemed a surgical candidate.  9/13-11/21/2023: Received weekly carboplatin  X 7 and radiation. Patient had a reaction to paclitaxel, it was omitted.   12/19/2023: Post treatment CT chest with improvement of masslike process RUL now 1.6 cm, suggest significant response to treatment. Mediastinal lymph nodes are now within normal limits of size.     Ms. Hays presents today with her daughter, grand daughter, and great grand daughters. She is in a wheelchair. She lives alone with her grand son, able to perform her ADLs including cooking, cleaning.     Treatment history:        2.       Interval History 10/11/2023   Ms. Hays presents for follow up today, accompanied by her daughter in law. She started treatment with paclitaxel and carboplatin on 10/9, unfortunately had a anaphylactic reaction to taxol after 8 minutes of infusion causing her to go to the ED. Patient was awake and feeling back to herself at the time of ED visit. Carboplatin was not administered.     She reports doing well overall, no new symptoms. Started radiation therapy yesterday.      Interval History 10/24/2023   Ms. Hays presents for cycle 2 of carboplatin with concurrent radiation. Did well with first cycle. Denies any adverse effects including nausea, vomiting, diarrhea, neuropathy. Appetite is good. Radiation is going well. No complaints today.  Denies any skin rash or  bites.    Interval History 11/21/2023   Ms. aHys presents today for cycle 7 of carboplatin with concurrent radiation. She reports good tolerance thus far without neuropathy, nausea, vomiting, diarrhea. Appetite is stable.   Denies any GIB.    Interval History 01/11/2024   Ms. Hays presents after completion of therapy. She was admitted to the hospital 12/31 due to shortness of breath, found to have Afib with RVR and coronavirus HKU. She received inpatient antibiotics and steroids. Currently with improvement, still reports some fatigue.   We reviewed her last CT chest, which shows improvement after treatment. Discussed continuing immunotherapy.      Interval History 02/22/2024   Ms. Hays presents today for follow, prior to cycle 3 of maintenance durvalumab. Accompanied by her grand daughter's fiance. She reports improved energy. Denies any bleeding, nausea, vomiting, diarrhea, rash. On examination, noted to have petechia on bilateral distal lower extremities and R distal upper extremities, she believes these started today. Denies pruritus.         Objective     Objective     Allergy:   Allergies   Allergen Reactions    Paclitaxel Anaphylaxis        Current Medications:   Current Outpatient Medications   Medication Sig Dispense Refill    albuterol sulfate  (90 Base) MCG/ACT inhaler Inhale 2 puffs Every 4 (Four) Hours As Needed for Wheezing. 18 g 5    apixaban (ELIQUIS) 5 MG tablet tablet Take 1 tablet by mouth Every 12 (Twelve) Hours. 180 tablet 3    atorvastatin (LIPITOR) 20 MG tablet Take 1 tablet by mouth Daily. 90 tablet 3    Budeson-Glycopyrrol-Formoterol (Breztri Aerosphere) 160-9-4.8 MCG/ACT aerosol inhaler Inhale 2 puffs 2 (Two) Times a Day. 10.7 g 8    hydrOXYzine pamoate (VISTARIL) 25 MG capsule Take 1 capsule by mouth Every Night.      insulin glargine (LANTUS, SEMGLEE) 100 UNIT/ML injection Inject 15 Units under the skin into the appropriate area as directed Daily. 1 mL 12    ipratropium-albuterol  (DUO-NEB) 0.5-2.5 mg/3 ml nebulizer USE 1 VIAL IN NEBULIZER 4 TIMES DAILY - and as needed 320 mL 11    lidocaine-prilocaine (EMLA) 2.5-2.5 % cream Apply to port-a-cath site 30 minutes prior to arrival at infusion center. Cover with plastic wrap. 30 g 1    lisinopril (PRINIVIL,ZESTRIL) 5 MG tablet Take 1 tablet by mouth Daily. 90 tablet 3    metFORMIN (GLUCOPHAGE) 500 MG tablet Take 1 tablet by mouth 2 (Two) Times a Day As Needed (only takes if blood glucose is above 200).      metoprolol tartrate (LOPRESSOR) 25 MG tablet Take 0.5 tablets by mouth Every 12 (Twelve) Hours. 30 tablet 3    ondansetron (Zofran) 8 MG tablet Take 1 tablet by mouth Every 8 (Eight) Hours As Needed for Nausea or Vomiting. 30 tablet 1    pantoprazole (PROTONIX) 40 MG EC tablet Take 1 tablet by mouth Daily.      prochlorperazine (COMPAZINE) 10 MG tablet Take 1 tablet by mouth Every 6 (Six) Hours As Needed for Nausea or Vomiting. 30 tablet 1    spironolactone (ALDACTONE) 25 MG tablet Take 1 tablet by mouth Daily.       No current facility-administered medications for this visit.     Facility-Administered Medications Ordered in Other Visits   Medication Dose Route Frequency Provider Last Rate Last Admin    heparin injection 500 Units  500 Units Intravenous PRN Bailee Gan APRN   500 Units at 01/26/24 1558    sodium chloride 0.9 % flush 10 mL  10 mL Intravenous PRN Bailee Gan APRN           Past Medical History:  Past Medical History:   Diagnosis Date    Arthritis     CHF (congestive heart failure)     Chronic anticoagulation     Eliquis    Chronic kidney disease     stage II/IIIa    Collapsed lung     COPD (chronic obstructive pulmonary disease)     Diabetes mellitus     TYPE 2    Elevated cholesterol     GERD (gastroesophageal reflux disease)     Hyperlipidemia     Hypertension     Non-small cell lung cancer RUL     Sleep apnea     non compliant with CPAP    Stroke 2019    Wears eyeglasses        Past Surgical History:  Past  Surgical History:   Procedure Laterality Date    BRONCHOSCOPY Bilateral 2023    Procedure: BRONCHOSCOPY WITH ENDOBRONCHIAL ULTRASOUND;  Surgeon: Abdulkadir Peter MD;  Location:  [facility];  Service: Pulmonary;  Laterality: Bilateral;    BRONCHOSCOPY WITH ION ROBOTIC ASSIST N/A 2023    Procedure: BRONCHOSCOPY WITH ION ROBOT AND EBUS;  Surgeon: John Clemens MD;  Location:  [facility];  Service: Robotics - Pulmonary;  Laterality: N/A;  Ion cath #6 - 0032,  #6 - 0030, cath guide # 0077. EBUS scope removed with balloon intact.    CARDIAC CATHETERIZATION N/A 2017    Procedure: Left Heart Cath;  Surgeon: Jatinder Matias MD;  Location:  [facility];  Service:     CARDIAC CATHETERIZATION N/A 2021    Procedure: Left Heart Cath;  Surgeon: Tolu Steinberg MD;  Location:  [facility];  Service: Cardiology;  Laterality: N/A;    COLONOSCOPY      ENDOSCOPY      GALLBLADDER SURGERY      PORTACATH PLACEMENT N/A 2023    Procedure: INSERTION OF PORTACATH;  Surgeon: Petr Velásquez MD;  Location:  [facility];  Service: General;  Laterality: N/A;    VENTRAL HERNIA REPAIR N/A 2020    Procedure: VENTRAL HERNIA REPAIR LAPAROSCOPIC WITH DAVINCI ROBOT;  Surgeon: Petr Velásquez MD;  Location:  [facility];  Service: DaVinci;  Laterality: N/A;       Social History:  Social History     Socioeconomic History    Marital status: Single    Number of children: 6   Tobacco Use    Smoking status: Former     Packs/day: 3     Types: Cigarettes     Start date: 4/3/1998     Quit date: 2015     Years since quittin.1     Passive exposure: Past    Smokeless tobacco: Never   Vaping Use    Vaping Use: Never used   Substance and Sexual Activity    Alcohol use: No    Drug use: No    Sexual activity: Never         Family History:  Family History   Problem Relation Age of Onset    Heart disease Mother         Rhianna sophia    Heart disease Father     Heart attack  Father     Heart failure Father        Review of Systems:  Review of Systems   Constitutional:  Positive for fatigue.   All other systems reviewed and are negative.      Vital Signs:   /67   Pulse 83   Temp 98 °F (36.7 °C) (Temporal)   Resp 18   Wt 101 kg (223 lb)   SpO2 92%   BMI 36.01 kg/m²      Physical Exam:  Physical Exam  Vitals reviewed.   Constitutional:       General: She is not in acute distress.     Appearance: Normal appearance. She is obese. She is not ill-appearing.      Comments: +hard of hearing   HENT:      Head: Normocephalic and atraumatic.      Mouth/Throat:      Mouth: Mucous membranes are moist.      Pharynx: Oropharynx is clear.   Eyes:      Conjunctiva/sclera: Conjunctivae normal.      Pupils: Pupils are equal, round, and reactive to light.   Cardiovascular:      Rate and Rhythm: Normal rate and regular rhythm.      Heart sounds: No murmur heard.  Pulmonary:      Effort: Pulmonary effort is normal. No respiratory distress.      Breath sounds: Wheezing present.   Chest:      Comments: +R chest port  Abdominal:      General: Abdomen is flat. Bowel sounds are normal. There is no distension.      Palpations: Abdomen is soft. There is no mass.      Tenderness: There is no abdominal tenderness. There is no guarding.   Musculoskeletal:         General: No swelling. Normal range of motion.      Cervical back: Normal range of motion.   Lymphadenopathy:      Cervical: No cervical adenopathy.   Skin:     General: Skin is warm and dry.      Findings: Petechiae present.      Comments: Distal b/l LE and RUE   Neurological:      General: No focal deficit present.      Mental Status: She is alert and oriented to person, place, and time. Mental status is at baseline.   Psychiatric:         Mood and Affect: Mood normal.         PHQ-9 Score  PHQ-9 Total Score:       Pain Score  Vitals:    02/22/24 1305   BP: 134/67   Pulse: 83   Resp: 18   Temp: 98 °F (36.7 °C)   TempSrc: Temporal   SpO2: 92%    Weight: 101 kg (223 lb)   PainSc: 0-No pain                             PAINSCOREQUALITYMETRIC:   Vitals:    02/22/24 1305   PainSc: 0-No pain                    Lab Review  Lab Results   Component Value Date    WBC 5.53 02/08/2024    HGB 11.8 (L) 02/08/2024    HCT 36.9 02/08/2024    MCV 89.3 02/08/2024    RDW 14.2 02/08/2024     02/08/2024    NEUTRORELPCT 71.4 02/08/2024    LYMPHORELPCT 17.4 (L) 02/08/2024    MONORELPCT 6.5 02/08/2024    EOSRELPCT 3.3 02/08/2024    BASORELPCT 0.9 02/08/2024    NEUTROABS 3.95 02/08/2024    LYMPHSABS 0.96 02/08/2024     Lab Results   Component Value Date     02/08/2024    K 3.9 02/08/2024    CO2 29.6 (H) 02/08/2024     02/08/2024    BUN 14 02/08/2024    CREATININE 0.86 02/08/2024    EGFRIFNONA 51 (L) 11/22/2021    GLUCOSE 188 (H) 02/08/2024    CALCIUM 9.7 02/08/2024    ALKPHOS 109 02/08/2024    AST 12 02/08/2024    ALT 11 02/08/2024    BILITOT 0.4 02/08/2024    ALBUMIN 4.1 02/08/2024    PROTEINTOT 7.1 02/08/2024    MG 2.2 01/03/2024    PHOS 3.5 05/18/2020     Lab Results   Component Value Date    RETICCTPCT 4.26 (H) 08/16/2023     Lab Results   Component Value Date    FERRITIN 542.80 (H) 01/11/2024    IRON 70 01/11/2024    TIBC 261 (L) 01/11/2024    LABIRON 27 01/11/2024    BTHFEFUG27 548 08/14/2023    FOLATE 12.90 08/14/2023        Radiology Results  CT Chest Without Contrast Diagnostic (12/31/2023 20:30)   IMPRESSION:  Spiculated nodule of the right upper lobe concerning for neoplasm.  Masslike consolidation in the right lower lobe could represent pneumonia  with underlying mass not excluded. Atelectasis/airspace disease in the  left lower lobe and lingula. Mediastinal adenopathy. Follow-up  recommended.    CT Chest With Contrast Diagnostic (12/19/2023 14:47)     IMPRESSION:  1.  Significant decrease size and masslike appearance of process in the  right upper lobe which would suggest significant response to treatment.  2.  No suspicious pulmonary nodules or  masses identified.  3.  Mediastinal lymph nodes are now within normal limits for size.  No  hilar or mediastinal adenopathy.  4.  Mild centrilobular nodularity of the left lower lobe which may  reflect changes of atypical pneumonia or aspiration pneumonitis.  5.  Stable cardiomegaly.  6.  Other incidental/nonacute findings above.      CT Angiogram Chest Pulmonary Embolism (10/09/2023 16:41)   MPRESSION:  1.  Right upper lobe mass in association with pneumonia has increased in  size and extent when compared to the previous exam with extension to the  pleural surface. Masslike component is approximately 56.8 x 44.5 mm and  was previously 40.1 x 36.7 mm. This would correspond to primary  malignancy.  2.  Central bronchial wall thickening. Can be seen with reactive airway  disease or bronchitis.  3.  1.3 cm left adrenal nodule is noted. No significant change from  previous.  4.  Increased size of paratracheal and mediastinal lymph nodes. A right  paratracheal lymph node is 2.5 cm and was previously 2.2 cm.  5. No PE identified.  6. Marked cardiomegaly, stable.  7. Otherwise stable chest with other nonacute/incidental findings as  above.    CT Head Without Contrast (10/09/2023 12:00)   IMPRESSION:    Unremarkable exam demonstrating no CT evidence of acute intracranial  findings.      CT Chest Without Contrast Diagnostic (08/09/2023 12:38)       NM PET/CT Skull Base to Mid Thigh (06/20/2023 11:12)         CT Chest Low Dose Cancer Screening WO (02/14/2023 12:58)   IMPRESSION:    Interval development or enlargement of a 1.9 cm spiculated right upper  lobe pulmonary nodule concerning in appearance for malignancy and PET/CT  is recommended for further evaluation.        Pathology:     Tissue Pathology Exam (09/06/2023 08:13)         6/29/23           Assessment / Plan         Assessment and Plan   Mayra Hays is a 78 y.o. year old presents for       Non small cell lung cancer   Cancer Staging   Stage IIIA (cT1c, cN2,  cM0)  -Oncology history as above. Patient with 1.9 cm spiculated right upper lobe nodule on low dose CT chest screen (2/2023). EBUS by Dr. Peter negative for malignancy (2/2023). PET/CT with hypermetabolism RUL 2.1 cm and pretracheal region lymph node (6/2023). CT guided biopsy negative for malignancy (6/2023). Navigational bronchoscopy with positive RUL and  4R (paratracheal) for squamous cell (9/6/2023). PD-L1 TPS score 60%.   -Patient not deemed for surgical resection. We reviewed her staging and treatment options which include concurrent chemo-radiation followed by adjuvant immunotherapy. Chemotherapy agents to be used would include weekly carboplatin AUC2 and paclitaxel 50 mg/m2. Chemo will be followed by one year of Durvalumab based on PACIFIC trial.   -Patient experienced anaphylaxis 8 minutes into the paclitaxel on first cycle 10/9. Due to this, paclitaxel will be omitted from weekly chemotherapy.  -Radiation therapy 10/10; 6000 cGy in 30 treatment fractions. Complete 11/21/2023  -Weekly carboplatin with concurrent radiation 10/18-11/21/2023  -Post treatment CT 12/19/23 with good response  -Cont maintenance durvalumab every 2 weeks    2. Anemia; suspicious for iron deficiency anemia due to GIB  - Patient with normal H/H in our system 2/2023, with acute drop in Hgb 6/29 (10.7) to Hgb (6.5) on 8/9 requiring transfusion. Patient reports one episode of melena two days ago (she is a poor historian).   -Last colonoscopy and endoscopy are unknown; think it may have been >5 years ago  -CBC from 8/14 show Hgb 8.2, Hct 27, MCV 90. Normal WBC and platelet count. Iron studies with normal iron (37), TIBC (435), and low iron saturation (9). This is in light of recent blood transfusion. Normal folate and B12 level. Reticulocyte count noted to be elevated to 6%  -Patient is with fatigue. Denies shortness of breath (aside from baseline COPD) or chest pain. Continues to be on Eliquis for Afib  -Initial work up from  consultation confirmed iron deficiency anemia, normal folate/b12 levels. No indication of hemolysis seen with normal LDH and haptoglobin. Myeloma work up has been negative with no M spike on serum protein electrophoresis and normal k/l free light chain ratio. Peripheral smear with normal total WBC without granulocytic dysplasia or blasts.  -Received Ferumoxytol 8/29/2023 and 11/27/23  -Pt to see surgery post treatment for repeat EGD and Colonoscopy     3. Malignancy associated pain  - Currently denies    4. Surveillance (per [facility])  -Surveillance would include CT with and without contrast every 3 to 6 months for 3 years, then every 6 months for 2 years, then low-dose noncontrast CT annually.     5. Petechiae   - Distal lower extremities, non pruritic   - Plt counts 203K today, cont to monitor      Discussed possible differential diagnoses, testing, treatment, recommended non-pharmacological interventions, risks, warning signs to monitor for that would indicate need for follow-up in clinic or ER. If no improvement with these regimens or you have new or worsening symptoms follow-up. Patient verbalizes understanding and agreement with plan of care. Denies further needs or concerns.     Patient was given instructions and counseling regarding her condition and for health maintenance advised.       All questions were answered to her satisfaction.              Meds ordered during this visit  No orders of the defined types were placed in this encounter.      Visit Diagnoses    ICD-10-CM ICD-9-CM   1. Primary cancer of right upper lobe of lung  C34.11 162.3   2. Non-small cell cancer of right lung  C34.91 162.9                 Follow Up   In 1 month with tx with CBC, iron studies, ferritin, CMP, TSH  Order surveillance CT chest next visit         This document has been electronically signed by Shawna Bond MD   February 22, 2024 13:31 EST    Dictated Utilizing Dragon Dictation: Part of this note may be an electronic  transcription/translation of spoken language to printed text using the Dragon Dictation System.

## 2024-03-07 ENCOUNTER — LAB (OUTPATIENT)
Dept: ONCOLOGY | Facility: HOSPITAL | Age: 79
End: 2024-03-07
Payer: MEDICARE

## 2024-03-07 ENCOUNTER — INFUSION (OUTPATIENT)
Dept: ONCOLOGY | Facility: HOSPITAL | Age: 79
End: 2024-03-07
Payer: MEDICARE

## 2024-03-07 VITALS
RESPIRATION RATE: 18 BRPM | WEIGHT: 212 LBS | SYSTOLIC BLOOD PRESSURE: 163 MMHG | HEART RATE: 65 BPM | OXYGEN SATURATION: 91 % | BODY MASS INDEX: 34.23 KG/M2 | DIASTOLIC BLOOD PRESSURE: 85 MMHG | TEMPERATURE: 97.8 F

## 2024-03-07 DIAGNOSIS — Z95.828 PORT-A-CATH IN PLACE: ICD-10-CM

## 2024-03-07 DIAGNOSIS — C34.91 NON-SMALL CELL CANCER OF RIGHT LUNG: ICD-10-CM

## 2024-03-07 DIAGNOSIS — C34.11 PRIMARY CANCER OF RIGHT UPPER LOBE OF LUNG: ICD-10-CM

## 2024-03-07 DIAGNOSIS — C34.11 PRIMARY CANCER OF RIGHT UPPER LOBE OF LUNG: Primary | ICD-10-CM

## 2024-03-07 LAB
ALBUMIN SERPL-MCNC: 4 G/DL (ref 3.5–5.2)
ALBUMIN/GLOB SERPL: 1.2 G/DL
ALP SERPL-CCNC: 130 U/L (ref 39–117)
ALT SERPL W P-5'-P-CCNC: 12 U/L (ref 1–33)
ANION GAP SERPL CALCULATED.3IONS-SCNC: 11.5 MMOL/L (ref 5–15)
AST SERPL-CCNC: 12 U/L (ref 1–32)
BASOPHILS # BLD AUTO: 0.06 10*3/MM3 (ref 0–0.2)
BASOPHILS NFR BLD AUTO: 0.8 % (ref 0–1.5)
BILIRUB SERPL-MCNC: 0.2 MG/DL (ref 0–1.2)
BUN SERPL-MCNC: 19 MG/DL (ref 8–23)
BUN/CREAT SERPL: 22.6 (ref 7–25)
CALCIUM SPEC-SCNC: 9.6 MG/DL (ref 8.6–10.5)
CHLORIDE SERPL-SCNC: 104 MMOL/L (ref 98–107)
CO2 SERPL-SCNC: 25.5 MMOL/L (ref 22–29)
CREAT SERPL-MCNC: 0.84 MG/DL (ref 0.57–1)
DEPRECATED RDW RBC AUTO: 40.9 FL (ref 37–54)
EGFRCR SERPLBLD CKD-EPI 2021: 71.7 ML/MIN/1.73
EOSINOPHIL # BLD AUTO: 0.27 10*3/MM3 (ref 0–0.4)
EOSINOPHIL NFR BLD AUTO: 3.4 % (ref 0.3–6.2)
ERYTHROCYTE [DISTWIDTH] IN BLOOD BY AUTOMATED COUNT: 12.6 % (ref 12.3–15.4)
GLOBULIN UR ELPH-MCNC: 3.3 GM/DL
GLUCOSE SERPL-MCNC: 111 MG/DL (ref 65–99)
HCT VFR BLD AUTO: 35.9 % (ref 34–46.6)
HGB BLD-MCNC: 11.6 G/DL (ref 12–15.9)
IMM GRANULOCYTES # BLD AUTO: 0.06 10*3/MM3 (ref 0–0.05)
IMM GRANULOCYTES NFR BLD AUTO: 0.8 % (ref 0–0.5)
LYMPHOCYTES # BLD AUTO: 1.08 10*3/MM3 (ref 0.7–3.1)
LYMPHOCYTES NFR BLD AUTO: 13.7 % (ref 19.6–45.3)
MCH RBC QN AUTO: 28.9 PG (ref 26.6–33)
MCHC RBC AUTO-ENTMCNC: 32.3 G/DL (ref 31.5–35.7)
MCV RBC AUTO: 89.5 FL (ref 79–97)
MONOCYTES # BLD AUTO: 0.62 10*3/MM3 (ref 0.1–0.9)
MONOCYTES NFR BLD AUTO: 7.8 % (ref 5–12)
NEUTROPHILS NFR BLD AUTO: 5.81 10*3/MM3 (ref 1.7–7)
NEUTROPHILS NFR BLD AUTO: 73.5 % (ref 42.7–76)
NRBC BLD AUTO-RTO: 0 /100 WBC (ref 0–0.2)
PLATELET # BLD AUTO: 230 10*3/MM3 (ref 140–450)
PMV BLD AUTO: 10.8 FL (ref 6–12)
POTASSIUM SERPL-SCNC: 4 MMOL/L (ref 3.5–5.2)
PROT SERPL-MCNC: 7.3 G/DL (ref 6–8.5)
RBC # BLD AUTO: 4.01 10*6/MM3 (ref 3.77–5.28)
SODIUM SERPL-SCNC: 141 MMOL/L (ref 136–145)
T4 FREE SERPL-MCNC: 1.04 NG/DL (ref 0.93–1.7)
TSH SERPL DL<=0.05 MIU/L-ACNC: 3.97 UIU/ML (ref 0.27–4.2)
WBC NRBC COR # BLD AUTO: 7.9 10*3/MM3 (ref 3.4–10.8)

## 2024-03-07 PROCEDURE — 84443 ASSAY THYROID STIM HORMONE: CPT

## 2024-03-07 PROCEDURE — 85025 COMPLETE CBC W/AUTO DIFF WBC: CPT

## 2024-03-07 PROCEDURE — 96413 CHEMO IV INFUSION 1 HR: CPT

## 2024-03-07 PROCEDURE — 25010000002 DURVALUMAB 500 MG/10ML SOLUTION 10 ML VIAL: Performed by: INTERNAL MEDICINE

## 2024-03-07 PROCEDURE — 25810000003 SODIUM CHLORIDE 0.9 % SOLUTION: Performed by: INTERNAL MEDICINE

## 2024-03-07 PROCEDURE — 25010000002 HEPARIN LOCK FLUSH PER 10 UNITS: Performed by: NURSE PRACTITIONER

## 2024-03-07 PROCEDURE — 84439 ASSAY OF FREE THYROXINE: CPT

## 2024-03-07 PROCEDURE — 25810000003 SODIUM CHLORIDE 0.9 % SOLUTION 250 ML FLEX CONT: Performed by: INTERNAL MEDICINE

## 2024-03-07 PROCEDURE — 80053 COMPREHEN METABOLIC PANEL: CPT

## 2024-03-07 RX ORDER — SODIUM CHLORIDE 0.9 % (FLUSH) 0.9 %
20 SYRINGE (ML) INJECTION AS NEEDED
OUTPATIENT
Start: 2024-03-07

## 2024-03-07 RX ORDER — HEPARIN SODIUM (PORCINE) LOCK FLUSH IV SOLN 100 UNIT/ML 100 UNIT/ML
300 SOLUTION INTRAVENOUS ONCE
OUTPATIENT
Start: 2024-03-07

## 2024-03-07 RX ORDER — HEPARIN SODIUM (PORCINE) LOCK FLUSH IV SOLN 100 UNIT/ML 100 UNIT/ML
500 SOLUTION INTRAVENOUS AS NEEDED
OUTPATIENT
Start: 2024-03-07

## 2024-03-07 RX ORDER — SODIUM CHLORIDE 9 MG/ML
250 INJECTION, SOLUTION INTRAVENOUS ONCE
Status: COMPLETED | OUTPATIENT
Start: 2024-03-07 | End: 2024-03-07

## 2024-03-07 RX ORDER — SODIUM CHLORIDE 0.9 % (FLUSH) 0.9 %
10 SYRINGE (ML) INJECTION AS NEEDED
OUTPATIENT
Start: 2024-03-07

## 2024-03-07 RX ORDER — SODIUM CHLORIDE 0.9 % (FLUSH) 0.9 %
10 SYRINGE (ML) INJECTION AS NEEDED
Status: DISCONTINUED | OUTPATIENT
Start: 2024-03-07 | End: 2024-03-07 | Stop reason: HOSPADM

## 2024-03-07 RX ORDER — HEPARIN SODIUM (PORCINE) LOCK FLUSH IV SOLN 100 UNIT/ML 100 UNIT/ML
500 SOLUTION INTRAVENOUS AS NEEDED
Status: DISCONTINUED | OUTPATIENT
Start: 2024-03-07 | End: 2024-03-07 | Stop reason: HOSPADM

## 2024-03-07 RX ADMIN — Medication 10 ML: at 16:20

## 2024-03-07 RX ADMIN — SODIUM CHLORIDE 950 MG: 9 INJECTION, SOLUTION INTRAVENOUS at 15:19

## 2024-03-07 RX ADMIN — Medication 500 UNITS: at 16:20

## 2024-03-07 RX ADMIN — SODIUM CHLORIDE 250 ML: 9 INJECTION, SOLUTION INTRAVENOUS at 15:19

## 2024-03-14 DIAGNOSIS — C34.11 PRIMARY CANCER OF RIGHT UPPER LOBE OF LUNG: ICD-10-CM

## 2024-03-14 DIAGNOSIS — C34.91 NON-SMALL CELL CANCER OF RIGHT LUNG: Primary | ICD-10-CM

## 2024-03-14 RX ORDER — SODIUM CHLORIDE 9 MG/ML
250 INJECTION, SOLUTION INTRAVENOUS ONCE
OUTPATIENT
Start: 2024-03-21

## 2024-03-21 ENCOUNTER — OFFICE VISIT (OUTPATIENT)
Dept: ONCOLOGY | Facility: CLINIC | Age: 79
End: 2024-03-21
Payer: MEDICARE

## 2024-03-21 ENCOUNTER — OFFICE VISIT (OUTPATIENT)
Dept: RADIATION ONCOLOGY | Facility: HOSPITAL | Age: 79
End: 2024-03-21
Payer: MEDICARE

## 2024-03-21 ENCOUNTER — INFUSION (OUTPATIENT)
Dept: ONCOLOGY | Facility: HOSPITAL | Age: 79
End: 2024-03-21
Payer: MEDICARE

## 2024-03-21 ENCOUNTER — LAB (OUTPATIENT)
Dept: ONCOLOGY | Facility: HOSPITAL | Age: 79
End: 2024-03-21
Payer: MEDICARE

## 2024-03-21 VITALS
HEART RATE: 60 BPM | TEMPERATURE: 98.1 F | RESPIRATION RATE: 18 BRPM | OXYGEN SATURATION: 93 % | SYSTOLIC BLOOD PRESSURE: 103 MMHG | DIASTOLIC BLOOD PRESSURE: 64 MMHG

## 2024-03-21 VITALS
HEART RATE: 60 BPM | SYSTOLIC BLOOD PRESSURE: 103 MMHG | DIASTOLIC BLOOD PRESSURE: 64 MMHG | OXYGEN SATURATION: 93 % | TEMPERATURE: 98.1 F | RESPIRATION RATE: 18 BRPM

## 2024-03-21 VITALS
SYSTOLIC BLOOD PRESSURE: 103 MMHG | RESPIRATION RATE: 18 BRPM | DIASTOLIC BLOOD PRESSURE: 64 MMHG | OXYGEN SATURATION: 93 % | TEMPERATURE: 98.7 F | HEART RATE: 60 BPM

## 2024-03-21 DIAGNOSIS — C34.11 PRIMARY CANCER OF RIGHT UPPER LOBE OF LUNG: ICD-10-CM

## 2024-03-21 DIAGNOSIS — K90.49 MALABSORPTION DUE TO INTOLERANCE, NOT ELSEWHERE CLASSIFIED: ICD-10-CM

## 2024-03-21 DIAGNOSIS — D50.9 IRON DEFICIENCY ANEMIA, UNSPECIFIED IRON DEFICIENCY ANEMIA TYPE: ICD-10-CM

## 2024-03-21 DIAGNOSIS — R79.89 ELEVATED SERUM CREATININE: ICD-10-CM

## 2024-03-21 DIAGNOSIS — C34.91 NON-SMALL CELL CANCER OF RIGHT LUNG: Primary | ICD-10-CM

## 2024-03-21 DIAGNOSIS — R53.83 OTHER FATIGUE: ICD-10-CM

## 2024-03-21 DIAGNOSIS — C34.11 PRIMARY CANCER OF RIGHT UPPER LOBE OF LUNG: Primary | ICD-10-CM

## 2024-03-21 DIAGNOSIS — Z95.828 PORT-A-CATH IN PLACE: ICD-10-CM

## 2024-03-21 DIAGNOSIS — C34.91 NON-SMALL CELL CANCER OF RIGHT LUNG: ICD-10-CM

## 2024-03-21 LAB
ALBUMIN SERPL-MCNC: 4.2 G/DL (ref 3.5–5.2)
ALBUMIN/GLOB SERPL: 1.4 G/DL
ALP SERPL-CCNC: 132 U/L (ref 39–117)
ALT SERPL W P-5'-P-CCNC: 10 U/L (ref 1–33)
ANION GAP SERPL CALCULATED.3IONS-SCNC: 10.1 MMOL/L (ref 5–15)
AST SERPL-CCNC: 12 U/L (ref 1–32)
BASOPHILS # BLD AUTO: 0.04 10*3/MM3 (ref 0–0.2)
BASOPHILS NFR BLD AUTO: 0.5 % (ref 0–1.5)
BILIRUB SERPL-MCNC: 0.3 MG/DL (ref 0–1.2)
BUN SERPL-MCNC: 18 MG/DL (ref 8–23)
BUN/CREAT SERPL: 19.1 (ref 7–25)
CALCIUM SPEC-SCNC: 9.4 MG/DL (ref 8.6–10.5)
CHLORIDE SERPL-SCNC: 104 MMOL/L (ref 98–107)
CO2 SERPL-SCNC: 27.9 MMOL/L (ref 22–29)
CREAT SERPL-MCNC: 0.94 MG/DL (ref 0.57–1)
DEPRECATED RDW RBC AUTO: 39.6 FL (ref 37–54)
EGFRCR SERPLBLD CKD-EPI 2021: 62.6 ML/MIN/1.73
EOSINOPHIL # BLD AUTO: 0.28 10*3/MM3 (ref 0–0.4)
EOSINOPHIL NFR BLD AUTO: 3.7 % (ref 0.3–6.2)
ERYTHROCYTE [DISTWIDTH] IN BLOOD BY AUTOMATED COUNT: 12 % (ref 12.3–15.4)
FERRITIN SERPL-MCNC: 311.1 NG/ML (ref 13–150)
GLOBULIN UR ELPH-MCNC: 2.9 GM/DL
GLUCOSE SERPL-MCNC: 211 MG/DL (ref 65–99)
HCT VFR BLD AUTO: 36.1 % (ref 34–46.6)
HGB BLD-MCNC: 11.4 G/DL (ref 12–15.9)
IMM GRANULOCYTES # BLD AUTO: 0.08 10*3/MM3 (ref 0–0.05)
IMM GRANULOCYTES NFR BLD AUTO: 1.1 % (ref 0–0.5)
IRON 24H UR-MRATE: 50 MCG/DL (ref 37–145)
IRON SATN MFR SERPL: 15 % (ref 20–50)
LYMPHOCYTES # BLD AUTO: 1.02 10*3/MM3 (ref 0.7–3.1)
LYMPHOCYTES NFR BLD AUTO: 13.7 % (ref 19.6–45.3)
MCH RBC QN AUTO: 28.4 PG (ref 26.6–33)
MCHC RBC AUTO-ENTMCNC: 31.6 G/DL (ref 31.5–35.7)
MCV RBC AUTO: 90 FL (ref 79–97)
MONOCYTES # BLD AUTO: 0.6 10*3/MM3 (ref 0.1–0.9)
MONOCYTES NFR BLD AUTO: 8 % (ref 5–12)
NEUTROPHILS NFR BLD AUTO: 5.45 10*3/MM3 (ref 1.7–7)
NEUTROPHILS NFR BLD AUTO: 73 % (ref 42.7–76)
NRBC BLD AUTO-RTO: 0 /100 WBC (ref 0–0.2)
PLATELET # BLD AUTO: 267 10*3/MM3 (ref 140–450)
PMV BLD AUTO: 10.7 FL (ref 6–12)
POTASSIUM SERPL-SCNC: 4.2 MMOL/L (ref 3.5–5.2)
PROT SERPL-MCNC: 7.1 G/DL (ref 6–8.5)
RBC # BLD AUTO: 4.01 10*6/MM3 (ref 3.77–5.28)
SODIUM SERPL-SCNC: 142 MMOL/L (ref 136–145)
TIBC SERPL-MCNC: 326 MCG/DL (ref 298–536)
TRANSFERRIN SERPL-MCNC: 219 MG/DL (ref 200–360)
TSH SERPL DL<=0.05 MIU/L-ACNC: 2.25 UIU/ML (ref 0.27–4.2)
WBC NRBC COR # BLD AUTO: 7.47 10*3/MM3 (ref 3.4–10.8)

## 2024-03-21 PROCEDURE — 85025 COMPLETE CBC W/AUTO DIFF WBC: CPT

## 2024-03-21 PROCEDURE — 1126F AMNT PAIN NOTED NONE PRSNT: CPT | Performed by: RADIOLOGY

## 2024-03-21 PROCEDURE — 99214 OFFICE O/P EST MOD 30 MIN: CPT | Performed by: RADIOLOGY

## 2024-03-21 PROCEDURE — 25810000003 SODIUM CHLORIDE 0.9 % SOLUTION 250 ML FLEX CONT: Performed by: INTERNAL MEDICINE

## 2024-03-21 PROCEDURE — 3078F DIAST BP <80 MM HG: CPT | Performed by: RADIOLOGY

## 2024-03-21 PROCEDURE — 84443 ASSAY THYROID STIM HORMONE: CPT

## 2024-03-21 PROCEDURE — 82728 ASSAY OF FERRITIN: CPT

## 2024-03-21 PROCEDURE — 25010000002 DURVALUMAB 500 MG/10ML SOLUTION 10 ML VIAL: Performed by: INTERNAL MEDICINE

## 2024-03-21 PROCEDURE — 80053 COMPREHEN METABOLIC PANEL: CPT

## 2024-03-21 PROCEDURE — 25010000002 HEPARIN LOCK FLUSH PER 10 UNITS: Performed by: NURSE PRACTITIONER

## 2024-03-21 PROCEDURE — 96413 CHEMO IV INFUSION 1 HR: CPT

## 2024-03-21 PROCEDURE — 25810000003 SODIUM CHLORIDE 0.9 % SOLUTION: Performed by: INTERNAL MEDICINE

## 2024-03-21 PROCEDURE — 84466 ASSAY OF TRANSFERRIN: CPT

## 2024-03-21 PROCEDURE — 83540 ASSAY OF IRON: CPT

## 2024-03-21 PROCEDURE — 3074F SYST BP LT 130 MM HG: CPT | Performed by: RADIOLOGY

## 2024-03-21 RX ORDER — HEPARIN SODIUM (PORCINE) LOCK FLUSH IV SOLN 100 UNIT/ML 100 UNIT/ML
500 SOLUTION INTRAVENOUS AS NEEDED
Status: DISCONTINUED | OUTPATIENT
Start: 2024-03-21 | End: 2024-03-21 | Stop reason: HOSPADM

## 2024-03-21 RX ORDER — HEPARIN SODIUM (PORCINE) LOCK FLUSH IV SOLN 100 UNIT/ML 100 UNIT/ML
300 SOLUTION INTRAVENOUS ONCE
OUTPATIENT
Start: 2024-03-21

## 2024-03-21 RX ORDER — SODIUM CHLORIDE 0.9 % (FLUSH) 0.9 %
10 SYRINGE (ML) INJECTION AS NEEDED
Status: DISCONTINUED | OUTPATIENT
Start: 2024-03-21 | End: 2024-03-21 | Stop reason: HOSPADM

## 2024-03-21 RX ORDER — HEPARIN SODIUM (PORCINE) LOCK FLUSH IV SOLN 100 UNIT/ML 100 UNIT/ML
500 SOLUTION INTRAVENOUS AS NEEDED
OUTPATIENT
Start: 2024-03-21

## 2024-03-21 RX ORDER — SODIUM CHLORIDE 0.9 % (FLUSH) 0.9 %
20 SYRINGE (ML) INJECTION AS NEEDED
OUTPATIENT
Start: 2024-03-21

## 2024-03-21 RX ORDER — SODIUM CHLORIDE 9 MG/ML
250 INJECTION, SOLUTION INTRAVENOUS ONCE
OUTPATIENT
Start: 2024-04-04

## 2024-03-21 RX ORDER — SODIUM CHLORIDE 9 MG/ML
250 INJECTION, SOLUTION INTRAVENOUS ONCE
OUTPATIENT
Start: 2024-04-18

## 2024-03-21 RX ORDER — SODIUM CHLORIDE 9 MG/ML
250 INJECTION, SOLUTION INTRAVENOUS ONCE
Status: COMPLETED | OUTPATIENT
Start: 2024-03-21 | End: 2024-03-21

## 2024-03-21 RX ORDER — SODIUM CHLORIDE 0.9 % (FLUSH) 0.9 %
10 SYRINGE (ML) INJECTION AS NEEDED
OUTPATIENT
Start: 2024-03-21

## 2024-03-21 RX ADMIN — SODIUM CHLORIDE 950 MG: 9 INJECTION, SOLUTION INTRAVENOUS at 14:28

## 2024-03-21 RX ADMIN — SODIUM CHLORIDE 250 ML: 9 INJECTION, SOLUTION INTRAVENOUS at 14:28

## 2024-03-21 RX ADMIN — Medication 10 ML: at 15:35

## 2024-03-21 RX ADMIN — HEPARIN 500 UNITS: 100 SYRINGE at 15:35

## 2024-03-21 NOTE — PROGRESS NOTES
Subjective     Date: 3/21/2024    Referring Provider  No ref. provider found    Chief Complaint  Symptomatic anemia   2. Non small Cell Lung Cancer  Cancer Staging   Stage IIIA (cT1c, cN2, cM0)        Subjective      Mayra Hays is a 78 y.o. female who presents today to [facility]EMATOLOGY [facility]Y for follow up.    HPI:   78 y.o. female with past medical history of diabetes mellitus type 2, hyperlipidemia, COPD, hypertension, atrial fibrillation on anticoagulation (Eliquis), h/o CVA and previous tobacco use presents for symptomatic anemia and NSCLC.    She is present today with her daughter, Hailey, who is assisting with history. Pt started work up for RUL lung nodule , since then noticed to have a decrease in her Hgb to 10.7 from normal in 2023. More recently, her Hgb 8/9 was 6.5, she was directed to ED where she received 1U PRBC.     Patient reports increased fatigue over the last several months. She reports no bleeding, however does report dark colored stool (melena) two days ago. Denies any other evidence of melena or hematochezia.     She has previously been diagnosed with iron deficiency (years ago) requiring oral iron supplements, but never received IV iron or bone marrow biopsy.    She and her daughter are unsure of her last colonoscopy and endoscopy history, think it may have occurred >5 years ago but unsure.    She denies recent weight loss, fever, chills,  night sweats.  Previous smoker, quit 5-6 years ago, smoked 3 packs/day X 30 years. Denies alcohol or drug use. Family history significant for son with leukemia, brothers with lung cancer.     Oncology History:  2023: low dose CT chest screenin.9 cm spiculated right upper lobe pulmonary nodule concerning for malignancy, PET/CT recommended.   2023: EBUS by Dr. Peter negative for malignancy for bronchial washing and FNA of station 10R  2023: PET/CT: Right upper lobe pulmonary nodule, more  posterior possible satellite nodules are postobstructive process measuring 2.1 cm with mSUV 14.5. Also with pretracheal region lymph node 2.6 cm.   6/29/2023: CT guided needle biopsy was non diagnostic, showed fibrosis and chronic inflammation.   8/9/2023: Lab work showed anemia with Hgb 6.5. Referred to ED. Bronchoscopy deferred.   9/6/2023: Navigational bronchoscopy performed by Dr. Clemens- Right upper lobe lung transbronchial biopsy revealed squamous cell carcinoma, lymph node at station 4R also involved with squamous cell carcinoma. PD-L1 TPS 60%. Patient not deemed a surgical candidate.  9/13-11/21/2023: Received weekly carboplatin  X 7 and radiation. Patient had a reaction to paclitaxel, it was omitted.   12/19/2023: Post treatment CT chest with improvement of masslike process RUL now 1.6 cm, suggest significant response to treatment. Mediastinal lymph nodes are now within normal limits of size.     Ms. Hays presents today with her daughter, grand daughter, and great grand daughters. She is in a wheelchair. She lives alone with her grand son, able to perform her ADLs including cooking, cleaning.     Treatment history:        2.       Interval History 10/11/2023   Ms. Hays presents for follow up today, accompanied by her daughter in law. She started treatment with paclitaxel and carboplatin on 10/9, unfortunately had a anaphylactic reaction to taxol after 8 minutes of infusion causing her to go to the ED. Patient was awake and feeling back to herself at the time of ED visit. Carboplatin was not administered.     She reports doing well overall, no new symptoms. Started radiation therapy yesterday.      Interval History 10/24/2023   Ms. Hays presents for cycle 2 of carboplatin with concurrent radiation. Did well with first cycle. Denies any adverse effects including nausea, vomiting, diarrhea, neuropathy. Appetite is good. Radiation is going well. No complaints today.  Denies any skin rash or  bites.    Interval History 11/21/2023   Ms. Hays presents today for cycle 7 of carboplatin with concurrent radiation. She reports good tolerance thus far without neuropathy, nausea, vomiting, diarrhea. Appetite is stable.   Denies any GIB.    Interval History 01/11/2024   Ms. Hays presents after completion of therapy. She was admitted to the hospital 12/31 due to shortness of breath, found to have Afib with RVR and coronavirus HKU. She received inpatient antibiotics and steroids. Currently with improvement, still reports some fatigue.   We reviewed her last CT chest, which shows improvement after treatment. Discussed continuing immunotherapy.      Interval History 02/22/2024   Ms. Hays presents today for follow, prior to cycle 3 of maintenance durvalumab. Accompanied by her grand daughter's fiance. She reports improved energy. Denies any bleeding, nausea, vomiting, diarrhea, rash. On examination, noted to have petechia on bilateral distal lower extremities and R distal upper extremities, she believes these started today. Denies pruritus.     Interval History 03/21/2024    presents prior to C5 of consolidation therapy with durvalumab. She reports good tolerance to the last treatment, denies shortness of breath, diarrhea, nausea/vomiting, fatigue. She's been applying lotion to her legs, petechia has decreased, primarily just on her left leg now.     Objective     Objective     Allergy:   Allergies   Allergen Reactions    Paclitaxel Anaphylaxis        Current Medications:   Current Outpatient Medications   Medication Sig Dispense Refill    albuterol sulfate  (90 Base) MCG/ACT inhaler Inhale 2 puffs Every 4 (Four) Hours As Needed for Wheezing. 18 g 5    apixaban (ELIQUIS) 5 MG tablet tablet Take 1 tablet by mouth Every 12 (Twelve) Hours. 180 tablet 3    atorvastatin (LIPITOR) 20 MG tablet Take 1 tablet by mouth Daily. 90 tablet 3    Budeson-Glycopyrrol-Formoterol (Breztri Aerosphere) 160-9-4.8 MCG/ACT  aerosol inhaler Inhale 2 puffs 2 (Two) Times a Day. 10.7 g 8    hydrOXYzine pamoate (VISTARIL) 25 MG capsule Take 1 capsule by mouth Every Night.      insulin glargine (LANTUS, SEMGLEE) 100 UNIT/ML injection Inject 15 Units under the skin into the appropriate area as directed Daily. 1 mL 12    ipratropium-albuterol (DUO-NEB) 0.5-2.5 mg/3 ml nebulizer USE 1 VIAL IN NEBULIZER 4 TIMES DAILY - and as needed 320 mL 11    lidocaine-prilocaine (EMLA) 2.5-2.5 % cream Apply to port-a-cath site 30 minutes prior to arrival at infusion center. Cover with plastic wrap. 30 g 1    lisinopril (PRINIVIL,ZESTRIL) 5 MG tablet Take 1 tablet by mouth Daily. 90 tablet 3    metFORMIN (GLUCOPHAGE) 500 MG tablet Take 1 tablet by mouth 2 (Two) Times a Day As Needed (only takes if blood glucose is above 200).      metoprolol tartrate (LOPRESSOR) 25 MG tablet Take 0.5 tablets by mouth Every 12 (Twelve) Hours. 30 tablet 3    ondansetron (Zofran) 8 MG tablet Take 1 tablet by mouth Every 8 (Eight) Hours As Needed for Nausea or Vomiting. 30 tablet 1    pantoprazole (PROTONIX) 40 MG EC tablet Take 1 tablet by mouth Daily.      prochlorperazine (COMPAZINE) 10 MG tablet Take 1 tablet by mouth Every 6 (Six) Hours As Needed for Nausea or Vomiting. 30 tablet 1    spironolactone (ALDACTONE) 25 MG tablet Take 1 tablet by mouth Daily.       No current facility-administered medications for this visit.     Facility-Administered Medications Ordered in Other Visits   Medication Dose Route Frequency Provider Last Rate Last Admin    Durvalumab 950 mg in sodium chloride 0.9 % 269 mL chemo IVPB  950 mg Intravenous Once Shawna Bond MD        heparin injection 500 Units  500 Units Intravenous PRN Bailee Gan APRN   500 Units at 01/26/24 1558    sodium chloride 0.9 % flush 10 mL  10 mL Intravenous PRN Bailee Gan APRN        sodium chloride 0.9 % infusion 250 mL  250 mL Intravenous Once Shawna Bond MD           Past Medical  History:  Past Medical History:   Diagnosis Date    Arthritis     CHF (congestive heart failure)     Chronic anticoagulation     Eliquis    Chronic kidney disease     stage II/IIIa    Collapsed lung     COPD (chronic obstructive pulmonary disease)     Diabetes mellitus     TYPE 2    Elevated cholesterol     GERD (gastroesophageal reflux disease)     Hyperlipidemia     Hypertension     Non-small cell lung cancer RUL     Sleep apnea     non compliant with CPAP    Stroke 2019    Wears eyeglasses        Past Surgical History:  Past Surgical History:   Procedure Laterality Date    BRONCHOSCOPY Bilateral 02/27/2023    Procedure: BRONCHOSCOPY WITH ENDOBRONCHIAL ULTRASOUND;  Surgeon: Abdulkadir Peter MD;  Location: [facility] OR;  Service: Pulmonary;  Laterality: Bilateral;    BRONCHOSCOPY WITH ION ROBOTIC ASSIST N/A 9/6/2023    Procedure: BRONCHOSCOPY WITH ION ROBOT AND EBUS;  Surgeon: John Clemens MD;  Location:  [facility];  Service: Robotics - Pulmonary;  Laterality: N/A;  Ion cath #6 - 0032,  #6 - 0030, cath guide # 0077. EBUS scope removed with balloon intact.    CARDIAC CATHETERIZATION N/A 08/22/2017    Procedure: Left Heart Cath;  Surgeon: Jatinder Matias MD;  Location: [facility] CATH INVASIVE LOCATION;  Service:     CARDIAC CATHETERIZATION N/A 11/22/2021    Procedure: Left Heart Cath;  Surgeon: Tolu Steinberg MD;  Location:  [facility] CATH INVASIVE LOCATION;  Service: Cardiology;  Laterality: N/A;    COLONOSCOPY      ENDOSCOPY      GALLBLADDER SURGERY      PORTACATH PLACEMENT N/A 9/29/2023    Procedure: INSERTION OF PORTACATH;  Surgeon: Petr Velásquez MD;  Location: [facility] OR;  Service: General;  Laterality: N/A;    VENTRAL HERNIA REPAIR N/A 05/14/2020    Procedure: VENTRAL HERNIA REPAIR LAPAROSCOPIC WITH DAVINCI ROBOT;  Surgeon: Petr Velásquez MD;  Location:  [facility] OR;  Service: DaVinci;  Laterality: N/A;       Social History:  Social History     Socioeconomic History    Marital status:  Single    Number of children: 6   Tobacco Use    Smoking status: Former     Current packs/day: 0.00     Average packs/day: 3.0 packs/day for 16.7 years (50.2 ttl pk-yrs)     Types: Cigarettes     Start date: 4/3/1998     Quit date: 2015     Years since quittin.2     Passive exposure: Past    Smokeless tobacco: Never   Vaping Use    Vaping status: Never Used   Substance and Sexual Activity    Alcohol use: No    Drug use: No    Sexual activity: Never         Family History:  Family History   Problem Relation Age of Onset    Heart disease Mother         Rhianna sophia    Heart disease Father     Heart attack Father     Heart failure Father        Review of Systems:  Review of Systems   Constitutional:  Positive for fatigue.   All other systems reviewed and are negative.      Vital Signs:   /64   Pulse 60   Temp 98.7 °F (37.1 °C) (Oral)   Resp 18   SpO2 93%      Physical Exam:  Physical Exam  Vitals reviewed.   Constitutional:       General: She is not in acute distress.     Appearance: Normal appearance. She is obese. She is not ill-appearing.      Comments: +hard of hearing   HENT:      Head: Normocephalic and atraumatic.      Mouth/Throat:      Mouth: Mucous membranes are moist.      Pharynx: Oropharynx is clear.   Eyes:      Conjunctiva/sclera: Conjunctivae normal.      Pupils: Pupils are equal, round, and reactive to light.   Cardiovascular:      Rate and Rhythm: Normal rate and regular rhythm.      Heart sounds: No murmur heard.  Pulmonary:      Effort: Pulmonary effort is normal. No respiratory distress.      Breath sounds: Wheezing present.   Chest:      Comments: +R chest port  Abdominal:      General: Abdomen is flat. Bowel sounds are normal. There is no distension.      Palpations: Abdomen is soft. There is no mass.      Tenderness: There is no abdominal tenderness. There is no guarding.   Musculoskeletal:         General: No swelling. Normal range of motion.      Cervical back: Normal range of  motion.   Lymphadenopathy:      Cervical: No cervical adenopathy.   Skin:     General: Skin is warm and dry.      Findings: Petechiae present.      Comments: LLE    Neurological:      General: No focal deficit present.      Mental Status: She is alert and oriented to person, place, and time. Mental status is at baseline.   Psychiatric:         Mood and Affect: Mood normal.         PHQ-9 Score  PHQ-9 Total Score:       Pain Score  Vitals:    03/21/24 1344   BP: 103/64   Pulse: 60   Resp: 18   Temp: 98.7 °F (37.1 °C)   TempSrc: Oral   SpO2: 93%   PainSc: 0-No pain                               PAINSCOREQUALITYMETRIC:   Vitals:    03/21/24 1344   PainSc: 0-No pain                      Lab Review  Lab Results   Component Value Date    WBC 7.47 03/21/2024    HGB 11.4 (L) 03/21/2024    HCT 36.1 03/21/2024    MCV 90.0 03/21/2024    RDW 12.0 (L) 03/21/2024     03/21/2024    NEUTRORELPCT 73.0 03/21/2024    LYMPHORELPCT 13.7 (L) 03/21/2024    MONORELPCT 8.0 03/21/2024    EOSRELPCT 3.7 03/21/2024    BASORELPCT 0.5 03/21/2024    NEUTROABS 5.45 03/21/2024    LYMPHSABS 1.02 03/21/2024     Lab Results   Component Value Date     03/21/2024    K 4.2 03/21/2024    CO2 27.9 03/21/2024     03/21/2024    BUN 18 03/21/2024    CREATININE 0.94 03/21/2024    EGFRIFNONA 51 (L) 11/22/2021    GLUCOSE 211 (H) 03/21/2024    CALCIUM 9.4 03/21/2024    ALKPHOS 132 (H) 03/21/2024    AST 12 03/21/2024    ALT 10 03/21/2024    BILITOT 0.3 03/21/2024    ALBUMIN 4.2 03/21/2024    PROTEINTOT 7.1 03/21/2024    MG 2.2 01/03/2024    PHOS 3.5 05/18/2020     Lab Results   Component Value Date    RETICCTPCT 4.26 (H) 08/16/2023     Lab Results   Component Value Date    FERRITIN 311.10 (H) 03/21/2024    IRON 50 03/21/2024    TIBC 326 03/21/2024    LABIRON 15 (L) 03/21/2024    RATYMPPG70 548 08/14/2023    FOLATE 12.90 08/14/2023        Radiology Results  CT Chest Without Contrast Diagnostic (12/31/2023 20:30)   IMPRESSION:  Spiculated nodule of  the right upper lobe concerning for neoplasm.  Masslike consolidation in the right lower lobe could represent pneumonia  with underlying mass not excluded. Atelectasis/airspace disease in the  left lower lobe and lingula. Mediastinal adenopathy. Follow-up  recommended.    CT Chest With Contrast Diagnostic (12/19/2023 14:47)     IMPRESSION:  1.  Significant decrease size and masslike appearance of process in the  right upper lobe which would suggest significant response to treatment.  2.  No suspicious pulmonary nodules or masses identified.  3.  Mediastinal lymph nodes are now within normal limits for size.  No  hilar or mediastinal adenopathy.  4.  Mild centrilobular nodularity of the left lower lobe which may  reflect changes of atypical pneumonia or aspiration pneumonitis.  5.  Stable cardiomegaly.  6.  Other incidental/nonacute findings above.      CT Angiogram Chest Pulmonary Embolism (10/09/2023 16:41)   MPRESSION:  1.  Right upper lobe mass in association with pneumonia has increased in  size and extent when compared to the previous exam with extension to the  pleural surface. Masslike component is approximately 56.8 x 44.5 mm and  was previously 40.1 x 36.7 mm. This would correspond to primary  malignancy.  2.  Central bronchial wall thickening. Can be seen with reactive airway  disease or bronchitis.  3.  1.3 cm left adrenal nodule is noted. No significant change from  previous.  4.  Increased size of paratracheal and mediastinal lymph nodes. A right  paratracheal lymph node is 2.5 cm and was previously 2.2 cm.  5. No PE identified.  6. Marked cardiomegaly, stable.  7. Otherwise stable chest with other nonacute/incidental findings as  above.    CT Head Without Contrast (10/09/2023 12:00)   IMPRESSION:    Unremarkable exam demonstrating no CT evidence of acute intracranial  findings.      CT Chest Without Contrast Diagnostic (08/09/2023 12:38)       NM PET/CT Skull Base to Mid Thigh (06/20/2023 11:12)          CT Chest Low Dose Cancer Screening WO (02/14/2023 12:58)   IMPRESSION:    Interval development or enlargement of a 1.9 cm spiculated right upper  lobe pulmonary nodule concerning in appearance for malignancy and PET/CT  is recommended for further evaluation.        Pathology:     Tissue Pathology Exam (09/06/2023 08:13)         6/29/23           Assessment / Plan         Assessment and Plan   Mayra Hays is a 78 y.o. year old presents for       Non small cell lung cancer   Cancer Staging   Stage IIIA (cT1c, cN2, cM0)  -Oncology history as above. Patient with 1.9 cm spiculated right upper lobe nodule on low dose CT chest screen (2/2023). EBUS by Dr. Peter negative for malignancy (2/2023). PET/CT with hypermetabolism RUL 2.1 cm and pretracheal region lymph node (6/2023). CT guided biopsy negative for malignancy (6/2023). Navigational bronchoscopy with positive RUL and  4R (paratracheal) for squamous cell (9/6/2023). PD-L1 TPS score 60%.   -Patient not deemed for surgical resection. We reviewed her staging and treatment options which include concurrent chemo-radiation followed by adjuvant immunotherapy. Chemotherapy agents to be used would include weekly carboplatin AUC2 and paclitaxel 50 mg/m2. Chemo will be followed by one year of Durvalumab based on PACIFIC trial.   -Patient experienced anaphylaxis 8 minutes into the paclitaxel on first cycle 10/9. Due to this, paclitaxel will be omitted from weekly chemotherapy.  -Radiation therapy 10/10; 6000 cGy in 30 treatment fractions. Complete 11/21/2023  -Weekly carboplatin with concurrent radiation 10/18-11/21/2023  -Post treatment CT 12/19/23 with good response  -Cont maintenance durvalumab every 2 weeks; C4 today    2. Anemia; iron deficiency anemia due to GIB  - Patient with normal H/H in our system 2/2023, with acute drop in Hgb 6/29 (10.7) to Hgb (6.5) on 8/9 requiring transfusion. Patient reports one episode of melena two days ago (she is a poor historian).    -Last colonoscopy and endoscopy are unknown; think it may have been >5 years ago  -CBC from 8/14 show Hgb 8.2, Hct 27, MCV 90. Normal WBC and platelet count. Iron studies with normal iron (37), TIBC (435), and low iron saturation (9). This is in light of recent blood transfusion. Normal folate and B12 level. Reticulocyte count noted to be elevated to 6%  -Patient is with fatigue. Denies shortness of breath (aside from baseline COPD) or chest pain. Continues to be on Eliquis for Afib  -Initial work up from consultation confirmed iron deficiency anemia, normal folate/b12 levels. No indication of hemolysis seen with normal LDH and haptoglobin. Myeloma work up has been negative with no M spike on serum protein electrophoresis and normal k/l free light chain ratio. Peripheral smear with normal total WBC without granulocytic dysplasia or blasts.  -Received Ferumoxytol 8/29/2023 and 11/27/23  -Pt to see surgery post treatment for repeat EGD and Colonoscopy     3. Malignancy associated pain  - Currently denies    4. Surveillance (per NCCN)  -Surveillance would include CT with and without contrast every 3 to 6 months for 3 years, then every 6 months for 2 years, then low-dose noncontrast CT annually.   Next CT 4/2024     5. Petechiae   - Distal lower extremities, non pruritic. Improving, only present LLE today  - Plt counts 267K today, cont to monitor      Discussed possible differential diagnoses, testing, treatment, recommended non-pharmacological interventions, risks, warning signs to monitor for that would indicate need for follow-up in clinic or ER. If no improvement with these regimens or you have new or worsening symptoms follow-up. Patient verbalizes understanding and agreement with plan of care. Denies further needs or concerns.     Patient was given instructions and counseling regarding her condition and for health maintenance advised.       All questions were answered to her satisfaction.              Meds  ordered during this visit  No orders of the defined types were placed in this encounter.      Visit Diagnoses    ICD-10-CM ICD-9-CM   1. Non-small cell cancer of right lung  C34.91 162.9   2. Primary cancer of right upper lobe of lung  C34.11 162.3   3. Iron deficiency anemia, unspecified iron deficiency anemia type  D50.9 280.9   4. Malabsorption due to intolerance, not elsewhere classified  K90.49 579.8                   Follow Up   In 1 month with tx with CBC, iron studies, ferritin, CMP  Order surveillance CT chest next visit         This document has been electronically signed by Shawna Bond MD   March 21, 2024 14:16 EDT    Dictated Utilizing Dragon Dictation: Part of this note may be an electronic transcription/translation of spoken language to printed text using the Dragon Dictation System.

## 2024-03-21 NOTE — PROGRESS NOTES
FOLLOW UP NOTE    PATIENT:                                                      Mayra Hays  MEDICAL RECORD #:                        [id number]  :                                                          1945  COMPLETION DATE:   2/15/2024  DIAGNOSIS:     Carcinoma of the left lung  CANCER STAGING:       Primary cancer of right upper lobe of lung  Staging form: Lung, AJCC 8th Edition  - Clinical stage from 2023: Stage IIIA (cT1c, cN2, cM0) - Signed by Shawna Bond MD on 2023        BRIEF HISTORY: Mayra Hays is a 78-year-old female who had a workup for right upper lobe lung nodule on 23.  On 2023 she had a EBUS performed for a 1.9 cm percolated right upper lobe pulmonary nodule seen on a low-dose CT of the chest.  This was negative for malignancy.  A PET/CT on 2023 showed right upper lobe pulmonary nodule, more posterior possible satellite nodules are postobstructive process measuring 2.1 cm with an SUV of 14.5.  Also with pretracheal region lymph node of 2.6.  On 2023 navigational bronchoscopy showed the right upper lobe lung transbronchial biopsy revealed squamous cell carcinoma, lymph node at station 4R was also involved with squamous cell carcinoma.  PD-L1 TPS 60%.  The patient was deemed not a surgical candidate.    From  to 2023 she received carboplatinum x 7 with radiation.  On 2023 she had a posttreatment CT of the chest with improvement of the masslike process in the right upper lobe now 1.6 cm suggesting significant response to treatment mediastinal lymph nodes are within normal limits.  He received a total of 6000 cGy in 30 fractions between 10/10/2023 to 2023.    MEDICATIONS: Medication reconciliation for the patient was reviewed and confirmed in the electronic medical record.    Review of Systems   Respiratory:          The patient states that she uses oxygen as needed at home as well as breathing treatments.   All other systems  reviewed and are negative.            Physical Exam  Constitutional:       Appearance: Normal appearance.   HENT:      Head: Normocephalic and atraumatic.   Eyes:      Extraocular Movements: Extraocular movements intact.      Pupils: Pupils are equal, round, and reactive to light.   Cardiovascular:      Rate and Rhythm: Normal rate and regular rhythm.   Pulmonary:      Comments: Diffuse scattered wheezes in all lobes.  Musculoskeletal:      Cervical back: Normal range of motion.   Skin:     General: Skin is warm and dry.   Neurological:      General: No focal deficit present.      Mental Status: She is alert and oriented to person, place, and time.   Psychiatric:         Mood and Affect: Mood normal.         Behavior: Behavior normal.         VITAL SIGNS:   Vitals:    03/21/24 1337   BP: 103/64   Pulse: 60   Resp: 18   Temp: 98.1 °F (36.7 °C)   TempSrc: Oral   SpO2: 93%   PainSc: 0-No pain       Mayra Hays reports a pain score of 0.  .       The following portions of the patient's history were reviewed and updated as appropriate: allergies, current medications, past family history, past medical history, past social history, past surgical history and problem list.         Non-small cell cancer of right lung [C34.91]    IMPRESSION: Patient is recovering well from her radiation treatments uses oxygen and breathing treatments at home.    RECOMMENDATIONS: Plan to see her again for follow-up in 6 months              No follow-ups on file.     Fifi Fortune MD

## 2024-04-04 ENCOUNTER — LAB (OUTPATIENT)
Dept: ONCOLOGY | Facility: HOSPITAL | Age: 79
End: 2024-04-04
Payer: MEDICARE

## 2024-04-04 ENCOUNTER — INFUSION (OUTPATIENT)
Dept: ONCOLOGY | Facility: HOSPITAL | Age: 79
End: 2024-04-04
Payer: MEDICARE

## 2024-04-04 VITALS
TEMPERATURE: 97.7 F | SYSTOLIC BLOOD PRESSURE: 146 MMHG | OXYGEN SATURATION: 92 % | RESPIRATION RATE: 18 BRPM | DIASTOLIC BLOOD PRESSURE: 47 MMHG | HEART RATE: 65 BPM

## 2024-04-04 DIAGNOSIS — C34.11 PRIMARY CANCER OF RIGHT UPPER LOBE OF LUNG: ICD-10-CM

## 2024-04-04 DIAGNOSIS — C34.11 PRIMARY CANCER OF RIGHT UPPER LOBE OF LUNG: Primary | ICD-10-CM

## 2024-04-04 DIAGNOSIS — C34.91 NON-SMALL CELL CANCER OF RIGHT LUNG: ICD-10-CM

## 2024-04-04 DIAGNOSIS — Z95.828 PORT-A-CATH IN PLACE: ICD-10-CM

## 2024-04-04 LAB
ALBUMIN SERPL-MCNC: 4 G/DL (ref 3.5–5.2)
ALBUMIN/GLOB SERPL: 1.3 G/DL
ALP SERPL-CCNC: 131 U/L (ref 39–117)
ALT SERPL W P-5'-P-CCNC: 10 U/L (ref 1–33)
ANION GAP SERPL CALCULATED.3IONS-SCNC: 10 MMOL/L (ref 5–15)
AST SERPL-CCNC: 13 U/L (ref 1–32)
BASOPHILS # BLD AUTO: 0.06 10*3/MM3 (ref 0–0.2)
BASOPHILS NFR BLD AUTO: 0.9 % (ref 0–1.5)
BILIRUB SERPL-MCNC: 0.2 MG/DL (ref 0–1.2)
BUN SERPL-MCNC: 19 MG/DL (ref 8–23)
BUN/CREAT SERPL: 17 (ref 7–25)
CALCIUM SPEC-SCNC: 9.3 MG/DL (ref 8.6–10.5)
CHLORIDE SERPL-SCNC: 104 MMOL/L (ref 98–107)
CO2 SERPL-SCNC: 28 MMOL/L (ref 22–29)
CREAT SERPL-MCNC: 1.12 MG/DL (ref 0.57–1)
DEPRECATED RDW RBC AUTO: 38.9 FL (ref 37–54)
EGFRCR SERPLBLD CKD-EPI 2021: 50.8 ML/MIN/1.73
EOSINOPHIL # BLD AUTO: 0.39 10*3/MM3 (ref 0–0.4)
EOSINOPHIL NFR BLD AUTO: 5.9 % (ref 0.3–6.2)
ERYTHROCYTE [DISTWIDTH] IN BLOOD BY AUTOMATED COUNT: 12.1 % (ref 12.3–15.4)
GLOBULIN UR ELPH-MCNC: 3 GM/DL
GLUCOSE SERPL-MCNC: 140 MG/DL (ref 65–99)
HCT VFR BLD AUTO: 34.9 % (ref 34–46.6)
HGB BLD-MCNC: 10.9 G/DL (ref 12–15.9)
IMM GRANULOCYTES # BLD AUTO: 0.04 10*3/MM3 (ref 0–0.05)
IMM GRANULOCYTES NFR BLD AUTO: 0.6 % (ref 0–0.5)
LYMPHOCYTES # BLD AUTO: 1.08 10*3/MM3 (ref 0.7–3.1)
LYMPHOCYTES NFR BLD AUTO: 16.2 % (ref 19.6–45.3)
MCH RBC QN AUTO: 27.6 PG (ref 26.6–33)
MCHC RBC AUTO-ENTMCNC: 31.2 G/DL (ref 31.5–35.7)
MCV RBC AUTO: 88.4 FL (ref 79–97)
MONOCYTES # BLD AUTO: 0.64 10*3/MM3 (ref 0.1–0.9)
MONOCYTES NFR BLD AUTO: 9.6 % (ref 5–12)
NEUTROPHILS NFR BLD AUTO: 4.45 10*3/MM3 (ref 1.7–7)
NEUTROPHILS NFR BLD AUTO: 66.8 % (ref 42.7–76)
NRBC BLD AUTO-RTO: 0 /100 WBC (ref 0–0.2)
PLATELET # BLD AUTO: 260 10*3/MM3 (ref 140–450)
PMV BLD AUTO: 10.7 FL (ref 6–12)
POTASSIUM SERPL-SCNC: 4.2 MMOL/L (ref 3.5–5.2)
PROT SERPL-MCNC: 7 G/DL (ref 6–8.5)
RBC # BLD AUTO: 3.95 10*6/MM3 (ref 3.77–5.28)
SODIUM SERPL-SCNC: 142 MMOL/L (ref 136–145)
WBC NRBC COR # BLD AUTO: 6.66 10*3/MM3 (ref 3.4–10.8)

## 2024-04-04 PROCEDURE — 96413 CHEMO IV INFUSION 1 HR: CPT

## 2024-04-04 PROCEDURE — 25810000003 SODIUM CHLORIDE 0.9 % SOLUTION: Performed by: INTERNAL MEDICINE

## 2024-04-04 PROCEDURE — 80053 COMPREHEN METABOLIC PANEL: CPT

## 2024-04-04 PROCEDURE — 85025 COMPLETE CBC W/AUTO DIFF WBC: CPT

## 2024-04-04 PROCEDURE — 25010000002 DURVALUMAB 500 MG/10ML SOLUTION 10 ML VIAL: Performed by: INTERNAL MEDICINE

## 2024-04-04 PROCEDURE — 25810000003 SODIUM CHLORIDE 0.9 % SOLUTION 250 ML FLEX CONT: Performed by: INTERNAL MEDICINE

## 2024-04-04 PROCEDURE — 25010000002 HEPARIN LOCK FLUSH PER 10 UNITS: Performed by: NURSE PRACTITIONER

## 2024-04-04 RX ORDER — SODIUM CHLORIDE 0.9 % (FLUSH) 0.9 %
20 SYRINGE (ML) INJECTION AS NEEDED
OUTPATIENT
Start: 2024-04-04

## 2024-04-04 RX ORDER — SODIUM CHLORIDE 0.9 % (FLUSH) 0.9 %
10 SYRINGE (ML) INJECTION AS NEEDED
Status: DISCONTINUED | OUTPATIENT
Start: 2024-04-04 | End: 2024-04-04 | Stop reason: HOSPADM

## 2024-04-04 RX ORDER — SODIUM CHLORIDE 9 MG/ML
250 INJECTION, SOLUTION INTRAVENOUS ONCE
Status: COMPLETED | OUTPATIENT
Start: 2024-04-04 | End: 2024-04-04

## 2024-04-04 RX ORDER — HEPARIN SODIUM (PORCINE) LOCK FLUSH IV SOLN 100 UNIT/ML 100 UNIT/ML
500 SOLUTION INTRAVENOUS AS NEEDED
OUTPATIENT
Start: 2024-04-04

## 2024-04-04 RX ORDER — HEPARIN SODIUM (PORCINE) LOCK FLUSH IV SOLN 100 UNIT/ML 100 UNIT/ML
500 SOLUTION INTRAVENOUS AS NEEDED
Status: DISCONTINUED | OUTPATIENT
Start: 2024-04-04 | End: 2024-04-04 | Stop reason: HOSPADM

## 2024-04-04 RX ORDER — SODIUM CHLORIDE 0.9 % (FLUSH) 0.9 %
10 SYRINGE (ML) INJECTION AS NEEDED
OUTPATIENT
Start: 2024-04-04

## 2024-04-04 RX ORDER — HEPARIN SODIUM (PORCINE) LOCK FLUSH IV SOLN 100 UNIT/ML 100 UNIT/ML
300 SOLUTION INTRAVENOUS ONCE
OUTPATIENT
Start: 2024-04-04

## 2024-04-04 RX ADMIN — SODIUM CHLORIDE 950 MG: 9 INJECTION, SOLUTION INTRAVENOUS at 14:50

## 2024-04-04 RX ADMIN — SODIUM CHLORIDE 250 ML: 9 INJECTION, SOLUTION INTRAVENOUS at 14:50

## 2024-04-04 RX ADMIN — Medication 10 ML: at 15:58

## 2024-04-04 RX ADMIN — Medication 500 UNITS: at 15:58

## 2024-04-10 ENCOUNTER — HOSPITAL ENCOUNTER (OUTPATIENT)
Dept: CARDIOLOGY | Facility: HOSPITAL | Age: 79
Discharge: HOME | End: 2024-04-10
Admitting: NURSE PRACTITIONER
Payer: MEDICARE

## 2024-04-10 DIAGNOSIS — I48.0 PAROXYSMAL ATRIAL FIBRILLATION: Chronic | ICD-10-CM

## 2024-04-10 LAB
BH CV ECHO MEAS - ACS: 2.4 CM
BH CV ECHO MEAS - AO MAX PG: 8.4 MMHG
BH CV ECHO MEAS - AO MEAN PG: 4 MMHG
BH CV ECHO MEAS - AO ROOT DIAM: 3.6 CM
BH CV ECHO MEAS - AO V2 MAX: 145 CM/SEC
BH CV ECHO MEAS - AO V2 VTI: 36.1 CM
BH CV ECHO MEAS - EDV(CUBED): 132.7 ML
BH CV ECHO MEAS - EDV(MOD-SP4): 56.8 ML
BH CV ECHO MEAS - EF(MOD-BP): 52 %
BH CV ECHO MEAS - EF(MOD-SP4): 52.8 %
BH CV ECHO MEAS - ESV(CUBED): 68.9 ML
BH CV ECHO MEAS - ESV(MOD-SP4): 26.8 ML
BH CV ECHO MEAS - FS: 19.6 %
BH CV ECHO MEAS - IVS/LVPW: 0.91 CM
BH CV ECHO MEAS - IVSD: 1 CM
BH CV ECHO MEAS - LA DIMENSION: 4.6 CM
BH CV ECHO MEAS - LAT PEAK E' VEL: 6.7 CM/SEC
BH CV ECHO MEAS - LV DIASTOLIC VOL/BSA (35-75): 28 CM2
BH CV ECHO MEAS - LV MASS(C)D: 200.8 GRAMS
BH CV ECHO MEAS - LV SYSTOLIC VOL/BSA (12-30): 13.2 CM2
BH CV ECHO MEAS - LVIDD: 5.1 CM
BH CV ECHO MEAS - LVIDS: 4.1 CM
BH CV ECHO MEAS - LVOT AREA: 3.1 CM2
BH CV ECHO MEAS - LVOT DIAM: 2 CM
BH CV ECHO MEAS - LVPWD: 1.1 CM
BH CV ECHO MEAS - MED PEAK E' VEL: 6 CM/SEC
BH CV ECHO MEAS - MV E MAX VEL: 70.3 CM/SEC
BH CV ECHO MEAS - PA ACC TIME: 0.1 SEC
BH CV ECHO MEAS - RAP SYSTOLE: 10 MMHG
BH CV ECHO MEAS - RVSP: 36.4 MMHG
BH CV ECHO MEAS - SI(MOD-SP4): 14.8 ML/M2
BH CV ECHO MEAS - SV(MOD-SP4): 30 ML
BH CV ECHO MEAS - TAPSE (>1.6): 1.99 CM
BH CV ECHO MEAS - TR MAX PG: 26.4 MMHG
BH CV ECHO MEAS - TR MAX VEL: 257 CM/SEC
BH CV ECHO MEASUREMENTS AVERAGE E/E' RATIO: 11.07
LEFT ATRIUM VOLUME INDEX: 29.1 ML/M2

## 2024-04-10 PROCEDURE — 93306 TTE W/DOPPLER COMPLETE: CPT

## 2024-04-18 ENCOUNTER — LAB (OUTPATIENT)
Dept: ONCOLOGY | Facility: HOSPITAL | Age: 79
End: 2024-04-18
Payer: MEDICARE

## 2024-04-18 ENCOUNTER — INFUSION (OUTPATIENT)
Dept: ONCOLOGY | Facility: HOSPITAL | Age: 79
End: 2024-04-18
Payer: MEDICARE

## 2024-04-18 ENCOUNTER — OFFICE VISIT (OUTPATIENT)
Dept: ONCOLOGY | Facility: CLINIC | Age: 79
End: 2024-04-18
Payer: MEDICARE

## 2024-04-18 VITALS
HEART RATE: 57 BPM | TEMPERATURE: 97.7 F | SYSTOLIC BLOOD PRESSURE: 112 MMHG | DIASTOLIC BLOOD PRESSURE: 54 MMHG | OXYGEN SATURATION: 92 % | RESPIRATION RATE: 18 BRPM

## 2024-04-18 VITALS
HEART RATE: 57 BPM | DIASTOLIC BLOOD PRESSURE: 54 MMHG | RESPIRATION RATE: 18 BRPM | SYSTOLIC BLOOD PRESSURE: 112 MMHG | TEMPERATURE: 97.7 F | OXYGEN SATURATION: 92 %

## 2024-04-18 DIAGNOSIS — C34.11 PRIMARY CANCER OF RIGHT UPPER LOBE OF LUNG: ICD-10-CM

## 2024-04-18 DIAGNOSIS — C34.11 PRIMARY CANCER OF RIGHT UPPER LOBE OF LUNG: Primary | ICD-10-CM

## 2024-04-18 DIAGNOSIS — C34.91 NON-SMALL CELL CANCER OF RIGHT LUNG: ICD-10-CM

## 2024-04-18 DIAGNOSIS — C34.91 NON-SMALL CELL CANCER OF RIGHT LUNG: Primary | ICD-10-CM

## 2024-04-18 DIAGNOSIS — D50.9 IRON DEFICIENCY ANEMIA, UNSPECIFIED IRON DEFICIENCY ANEMIA TYPE: ICD-10-CM

## 2024-04-18 DIAGNOSIS — R53.83 OTHER FATIGUE: ICD-10-CM

## 2024-04-18 DIAGNOSIS — Z95.828 PORT-A-CATH IN PLACE: ICD-10-CM

## 2024-04-18 DIAGNOSIS — K90.49 MALABSORPTION DUE TO INTOLERANCE, NOT ELSEWHERE CLASSIFIED: ICD-10-CM

## 2024-04-18 LAB
ALBUMIN SERPL-MCNC: 3.9 G/DL (ref 3.5–5.2)
ALBUMIN/GLOB SERPL: 1.1 G/DL
ALP SERPL-CCNC: 157 U/L (ref 39–117)
ALT SERPL W P-5'-P-CCNC: 8 U/L (ref 1–33)
ANION GAP SERPL CALCULATED.3IONS-SCNC: 8.7 MMOL/L (ref 5–15)
AST SERPL-CCNC: 12 U/L (ref 1–32)
BASOPHILS # BLD AUTO: 0.06 10*3/MM3 (ref 0–0.2)
BASOPHILS NFR BLD AUTO: 0.9 % (ref 0–1.5)
BILIRUB SERPL-MCNC: 0.3 MG/DL (ref 0–1.2)
BUN SERPL-MCNC: 13 MG/DL (ref 8–23)
BUN/CREAT SERPL: 14.3 (ref 7–25)
CALCIUM SPEC-SCNC: 9.2 MG/DL (ref 8.6–10.5)
CHLORIDE SERPL-SCNC: 104 MMOL/L (ref 98–107)
CO2 SERPL-SCNC: 27.3 MMOL/L (ref 22–29)
CREAT SERPL-MCNC: 0.91 MG/DL (ref 0.57–1)
DEPRECATED RDW RBC AUTO: 38.5 FL (ref 37–54)
EGFRCR SERPLBLD CKD-EPI 2021: 65.1 ML/MIN/1.73
EOSINOPHIL # BLD AUTO: 0.37 10*3/MM3 (ref 0–0.4)
EOSINOPHIL NFR BLD AUTO: 5.3 % (ref 0.3–6.2)
ERYTHROCYTE [DISTWIDTH] IN BLOOD BY AUTOMATED COUNT: 12 % (ref 12.3–15.4)
GLOBULIN UR ELPH-MCNC: 3.4 GM/DL
GLUCOSE SERPL-MCNC: 182 MG/DL (ref 65–99)
HCT VFR BLD AUTO: 34.5 % (ref 34–46.6)
HGB BLD-MCNC: 10.7 G/DL (ref 12–15.9)
IMM GRANULOCYTES # BLD AUTO: 0.03 10*3/MM3 (ref 0–0.05)
IMM GRANULOCYTES NFR BLD AUTO: 0.4 % (ref 0–0.5)
LYMPHOCYTES # BLD AUTO: 1.06 10*3/MM3 (ref 0.7–3.1)
LYMPHOCYTES NFR BLD AUTO: 15.1 % (ref 19.6–45.3)
MCH RBC QN AUTO: 27.4 PG (ref 26.6–33)
MCHC RBC AUTO-ENTMCNC: 31 G/DL (ref 31.5–35.7)
MCV RBC AUTO: 88.2 FL (ref 79–97)
MONOCYTES # BLD AUTO: 0.56 10*3/MM3 (ref 0.1–0.9)
MONOCYTES NFR BLD AUTO: 8 % (ref 5–12)
NEUTROPHILS NFR BLD AUTO: 4.92 10*3/MM3 (ref 1.7–7)
NEUTROPHILS NFR BLD AUTO: 70.3 % (ref 42.7–76)
NRBC BLD AUTO-RTO: 0 /100 WBC (ref 0–0.2)
PLATELET # BLD AUTO: 254 10*3/MM3 (ref 140–450)
PMV BLD AUTO: 10.7 FL (ref 6–12)
POTASSIUM SERPL-SCNC: 3.8 MMOL/L (ref 3.5–5.2)
PROT SERPL-MCNC: 7.3 G/DL (ref 6–8.5)
RBC # BLD AUTO: 3.91 10*6/MM3 (ref 3.77–5.28)
SODIUM SERPL-SCNC: 140 MMOL/L (ref 136–145)
T4 FREE SERPL-MCNC: 1.29 NG/DL (ref 0.93–1.7)
TSH SERPL DL<=0.05 MIU/L-ACNC: 0.54 UIU/ML (ref 0.27–4.2)
WBC NRBC COR # BLD AUTO: 7 10*3/MM3 (ref 3.4–10.8)

## 2024-04-18 PROCEDURE — 25010000002 DURVALUMAB 500 MG/10ML SOLUTION 10 ML VIAL: Performed by: INTERNAL MEDICINE

## 2024-04-18 PROCEDURE — 25810000003 SODIUM CHLORIDE 0.9 % SOLUTION: Performed by: INTERNAL MEDICINE

## 2024-04-18 PROCEDURE — 96413 CHEMO IV INFUSION 1 HR: CPT

## 2024-04-18 PROCEDURE — 25810000003 SODIUM CHLORIDE 0.9 % SOLUTION 250 ML FLEX CONT: Performed by: INTERNAL MEDICINE

## 2024-04-18 PROCEDURE — 80053 COMPREHEN METABOLIC PANEL: CPT

## 2024-04-18 PROCEDURE — 84443 ASSAY THYROID STIM HORMONE: CPT

## 2024-04-18 PROCEDURE — 25010000002 HEPARIN LOCK FLUSH PER 10 UNITS: Performed by: NURSE PRACTITIONER

## 2024-04-18 PROCEDURE — 85025 COMPLETE CBC W/AUTO DIFF WBC: CPT

## 2024-04-18 PROCEDURE — 84439 ASSAY OF FREE THYROXINE: CPT

## 2024-04-18 RX ORDER — SODIUM CHLORIDE 0.9 % (FLUSH) 0.9 %
10 SYRINGE (ML) INJECTION AS NEEDED
Status: DISCONTINUED | OUTPATIENT
Start: 2024-04-18 | End: 2024-04-18 | Stop reason: HOSPADM

## 2024-04-18 RX ORDER — HEPARIN SODIUM (PORCINE) LOCK FLUSH IV SOLN 100 UNIT/ML 100 UNIT/ML
300 SOLUTION INTRAVENOUS ONCE
OUTPATIENT
Start: 2024-04-18

## 2024-04-18 RX ORDER — SODIUM CHLORIDE 9 MG/ML
250 INJECTION, SOLUTION INTRAVENOUS ONCE
Status: COMPLETED | OUTPATIENT
Start: 2024-04-18 | End: 2024-04-18

## 2024-04-18 RX ORDER — SODIUM CHLORIDE 0.9 % (FLUSH) 0.9 %
20 SYRINGE (ML) INJECTION AS NEEDED
OUTPATIENT
Start: 2024-04-18

## 2024-04-18 RX ORDER — HEPARIN SODIUM (PORCINE) LOCK FLUSH IV SOLN 100 UNIT/ML 100 UNIT/ML
500 SOLUTION INTRAVENOUS AS NEEDED
OUTPATIENT
Start: 2024-04-18

## 2024-04-18 RX ORDER — SODIUM CHLORIDE 0.9 % (FLUSH) 0.9 %
10 SYRINGE (ML) INJECTION AS NEEDED
OUTPATIENT
Start: 2024-04-18

## 2024-04-18 RX ORDER — HEPARIN SODIUM (PORCINE) LOCK FLUSH IV SOLN 100 UNIT/ML 100 UNIT/ML
500 SOLUTION INTRAVENOUS AS NEEDED
Status: DISCONTINUED | OUTPATIENT
Start: 2024-04-18 | End: 2024-04-18 | Stop reason: HOSPADM

## 2024-04-18 RX ADMIN — SODIUM CHLORIDE 950 MG: 9 INJECTION, SOLUTION INTRAVENOUS at 14:14

## 2024-04-18 RX ADMIN — SODIUM CHLORIDE 250 ML: 9 INJECTION, SOLUTION INTRAVENOUS at 14:14

## 2024-04-18 RX ADMIN — Medication 500 UNITS: at 15:24

## 2024-04-18 RX ADMIN — Medication 10 ML: at 15:24

## 2024-04-18 NOTE — PROGRESS NOTES
Subjective     Date: 2024    Referring Provider  No ref. provider found    Chief Complaint  Symptomatic anemia   2. Non small Cell Lung Cancer  Cancer Staging   Stage IIIA (cT1c, cN2, cM0)        Subjective      Mayra Hays is a 78 y.o. female who presents today to [facility] for follow up.    HPI:   78 y.o. female with past medical history of diabetes mellitus type 2, hyperlipidemia, COPD, hypertension, atrial fibrillation on anticoagulation (Eliquis), h/o CVA and previous tobacco use presents for symptomatic anemia and NSCLC.    She is present today with her daughter, Hailey, who is assisting with history. Pt started work up for RUL lung nodule , since then noticed to have a decrease in her Hgb to 10.7 from normal in 2023. More recently, her Hgb 8/9 was 6.5, she was directed to ED where she received 1U PRBC.     Patient reports increased fatigue over the last several months. She reports no bleeding, however does report dark colored stool (melena) two days ago. Denies any other evidence of melena or hematochezia.     She has previously been diagnosed with iron deficiency (years ago) requiring oral iron supplements, but never received IV iron or bone marrow biopsy.    She and her daughter are unsure of her last colonoscopy and endoscopy history, think it may have occurred >5 years ago but unsure.    She denies recent weight loss, fever, chills,  night sweats.  Previous smoker, quit 5-6 years ago, smoked 3 packs/day X 30 years. Denies alcohol or drug use. Family history significant for son with leukemia, brothers with lung cancer.     Oncology History:  2023: low dose CT chest screenin.9 cm spiculated right upper lobe pulmonary nodule concerning for malignancy, PET/CT recommended.   2023: EBUS by Dr. Peter negative for malignancy for bronchial washing and FNA of station 10R  2023: PET/CT: Right upper lobe pulmonary nodule, more  posterior possible satellite nodules are postobstructive process measuring 2.1 cm with mSUV 14.5. Also with pretracheal region lymph node 2.6 cm.   6/29/2023: CT guided needle biopsy was non diagnostic, showed fibrosis and chronic inflammation.   8/9/2023: Lab work showed anemia with Hgb 6.5. Referred to ED. Bronchoscopy deferred.   9/6/2023: Navigational bronchoscopy performed by Dr. Clemens- Right upper lobe lung transbronchial biopsy revealed squamous cell carcinoma, lymph node at station 4R also involved with squamous cell carcinoma. PD-L1 TPS 60%. Patient not deemed a surgical candidate.  9/13-11/21/2023: Received weekly carboplatin  X 7 and radiation. Patient had a reaction to paclitaxel, it was omitted.   12/19/2023: Post treatment CT chest with improvement of masslike process RUL now 1.6 cm, suggest significant response to treatment. Mediastinal lymph nodes are now within normal limits of size.     Ms. Hays presents today with her daughter, grand daughter, and great grand daughters. She is in a wheelchair. She lives alone with her grand son, able to perform her ADLs including cooking, cleaning.     Treatment history:        2.       Interval History 10/11/2023   Ms. Hays presents for follow up today, accompanied by her daughter in law. She started treatment with paclitaxel and carboplatin on 10/9, unfortunately had a anaphylactic reaction to taxol after 8 minutes of infusion causing her to go to the ED. Patient was awake and feeling back to herself at the time of ED visit. Carboplatin was not administered.     She reports doing well overall, no new symptoms. Started radiation therapy yesterday.      Interval History 10/24/2023   Ms. Hays presents for cycle 2 of carboplatin with concurrent radiation. Did well with first cycle. Denies any adverse effects including nausea, vomiting, diarrhea, neuropathy. Appetite is good. Radiation is going well. No complaints today.  Denies any skin rash or  bites.    Interval History 11/21/2023   Ms. Hays presents today for cycle 7 of carboplatin with concurrent radiation. She reports good tolerance thus far without neuropathy, nausea, vomiting, diarrhea. Appetite is stable.   Denies any GIB.    Interval History 01/11/2024   Ms. Hays presents after completion of therapy. She was admitted to the hospital 12/31 due to shortness of breath, found to have Afib with RVR and coronavirus HKU. She received inpatient antibiotics and steroids. Currently with improvement, still reports some fatigue.   We reviewed her last CT chest, which shows improvement after treatment. Discussed continuing immunotherapy.      Interval History 02/22/2024   Ms. Hays presents today for follow, prior to cycle 3 of maintenance durvalumab. Accompanied by her grand daughter's fiance. She reports improved energy. Denies any bleeding, nausea, vomiting, diarrhea, rash. On examination, noted to have petechia on bilateral distal lower extremities and R distal upper extremities, she believes these started today. Denies pruritus.     Interval History 03/21/2024    presents prior to C5 of consolidation therapy with durvalumab. She reports good tolerance to the last treatment, denies shortness of breath, diarrhea, nausea/vomiting, fatigue. She's been applying lotion to her legs, petechia has decreased, primarily just on her left leg now.     Interval History 04/18/2024   Ms. Hays presents prior to C7 of durvalumab. Reports good tolerance without nausea, vomiting, diarrhea. No changes in petechia. Denies any other complaints.    Objective     Objective     Allergy:   Allergies   Allergen Reactions    Paclitaxel Anaphylaxis        Current Medications:   Current Outpatient Medications   Medication Sig Dispense Refill    albuterol sulfate  (90 Base) MCG/ACT inhaler Inhale 2 puffs Every 4 (Four) Hours As Needed for Wheezing. 18 g 5    apixaban (ELIQUIS) 5 MG tablet tablet Take 1 tablet by mouth  Every 12 (Twelve) Hours. 180 tablet 3    atorvastatin (LIPITOR) 20 MG tablet Take 1 tablet by mouth Daily. 90 tablet 3    Budeson-Glycopyrrol-Formoterol (Breztri Aerosphere) 160-9-4.8 MCG/ACT aerosol inhaler Inhale 2 puffs 2 (Two) Times a Day. 10.7 g 8    hydrOXYzine pamoate (VISTARIL) 25 MG capsule Take 1 capsule by mouth Every Night.      insulin glargine (LANTUS, SEMGLEE) 100 UNIT/ML injection Inject 15 Units under the skin into the appropriate area as directed Daily. 1 mL 12    ipratropium-albuterol (DUO-NEB) 0.5-2.5 mg/3 ml nebulizer USE 1 VIAL IN NEBULIZER 4 TIMES DAILY - and as needed 320 mL 11    lidocaine-prilocaine (EMLA) 2.5-2.5 % cream Apply to port-a-cath site 30 minutes prior to arrival at infusion center. Cover with plastic wrap. 30 g 1    lisinopril (PRINIVIL,ZESTRIL) 5 MG tablet Take 1 tablet by mouth Daily. 90 tablet 3    metFORMIN (GLUCOPHAGE) 500 MG tablet Take 1 tablet by mouth 2 (Two) Times a Day As Needed (only takes if blood glucose is above 200).      metoprolol tartrate (LOPRESSOR) 25 MG tablet Take 0.5 tablets by mouth Every 12 (Twelve) Hours. 30 tablet 3    ondansetron (Zofran) 8 MG tablet Take 1 tablet by mouth Every 8 (Eight) Hours As Needed for Nausea or Vomiting. 30 tablet 1    pantoprazole (PROTONIX) 40 MG EC tablet Take 1 tablet by mouth Daily.      prochlorperazine (COMPAZINE) 10 MG tablet Take 1 tablet by mouth Every 6 (Six) Hours As Needed for Nausea or Vomiting. 30 tablet 1    spironolactone (ALDACTONE) 25 MG tablet Take 1 tablet by mouth Daily.       No current facility-administered medications for this visit.     Facility-Administered Medications Ordered in Other Visits   Medication Dose Route Frequency Provider Last Rate Last Admin    heparin injection 500 Units  500 Units Intravenous PRN Bailee Gan APRN   500 Units at 01/26/24 1558    heparin injection 500 Units  500 Units Intravenous PRN Bailee Gan APRN   500 Units at 04/18/24 1524    sodium chloride  0.9 % flush 10 mL  10 mL Intravenous PRN Gilbert, Bailee Sharmaine, APRN        sodium chloride 0.9 % flush 10 mL  10 mL Intravenous PRN Gilbert, Bailee Sharmaine, APRN   10 mL at 04/18/24 1524       Past Medical History:  Past Medical History:   Diagnosis Date    Arthritis     CHF (congestive heart failure)     Chronic anticoagulation     Eliquis    Chronic kidney disease     stage II/IIIa    Collapsed lung     COPD (chronic obstructive pulmonary disease)     Diabetes mellitus     TYPE 2    Elevated cholesterol     GERD (gastroesophageal reflux disease)     Hyperlipidemia     Hypertension     Non-small cell lung cancer RUL     Sleep apnea     non compliant with CPAP    Stroke 2019    Wears eyeglasses        Past Surgical History:  Past Surgical History:   Procedure Laterality Date    BRONCHOSCOPY Bilateral 02/27/2023    Procedure: BRONCHOSCOPY WITH ENDOBRONCHIAL ULTRASOUND;  Surgeon: Abdulkadir Peter MD;  Location:  [facility];  Service: Pulmonary;  Laterality: Bilateral;    BRONCHOSCOPY WITH ION ROBOTIC ASSIST N/A 9/6/2023    Procedure: BRONCHOSCOPY WITH ION ROBOT AND EBUS;  Surgeon: John Clemens MD;  Location:  [facility];  Service: Robotics - Pulmonary;  Laterality: N/A;  Ion cath #6 - 0032,  #6 - 0030, cath guide # 0077. EBUS scope removed with balloon intact.    CARDIAC CATHETERIZATION N/A 08/22/2017    Procedure: Left Heart Cath;  Surgeon: Jatinder Matias MD;  Location:  [facility];  Service:     CARDIAC CATHETERIZATION N/A 11/22/2021    Procedure: Left Heart Cath;  Surgeon: Tolu Steinberg MD;  [facility]:  [facility];  Service: Cardiology;  Laterality: N/A;    COLONOSCOPY      ENDOSCOPY      GALLBLADDER SURGERY      PORTACATH PLACEMENT N/A 9/29/2023    Procedure: INSERTION OF PORTACATH;  Surgeon: Petr Velásquez MD;  Location:  [facility];  Service: General;  Laterality: N/A;    VENTRAL HERNIA REPAIR N/A 05/14/2020    Procedure: VENTRAL HERNIA REPAIR LAPAROSCOPIC  Right arm; WITH DAVINCI ROBOT;  Surgeon: Petr Velásquez MD;  Location: [facility];  Service: DaVinci;  Laterality: N/A;       Social History:  Social History     Socioeconomic History    Marital status: Single    Number of children: 6   Tobacco Use    Smoking status: Former     Current packs/day: 0.00     Average packs/day: 3.0 packs/day for 16.7 years (50.2 ttl pk-yrs)     Types: Cigarettes     Start date: 4/3/1998     Quit date: 2015     Years since quittin.3     Passive exposure: Past    Smokeless tobacco: Never   Vaping Use    Vaping status: Never Used   Substance and Sexual Activity    Alcohol use: No    Drug use: No    Sexual activity: Never         Family History:  Family History   Problem Relation Age of Onset    Heart disease Mother         Rhianna sophia    Heart disease Father     Heart attack Father     Heart failure Father        Review of Systems:  Review of Systems   Constitutional:  Positive for fatigue.   All other systems reviewed and are negative.      Vital Signs:   /54   Pulse 57   Temp 97.7 °F (36.5 °C) (Oral)   Resp 18   SpO2 92%      Physical Exam:  Physical Exam  Vitals reviewed.   Constitutional:       General: She is not in acute distress.     Appearance: Normal appearance. She is obese. She is not ill-appearing.      Comments: +hard of hearing   HENT:      Head: Normocephalic and atraumatic.      Mouth/Throat:      Mouth: Mucous membranes are moist.      Pharynx: Oropharynx is clear.   Eyes:      Conjunctiva/sclera: Conjunctivae normal.      Pupils: Pupils are equal, round, and reactive to light.   Cardiovascular:      Rate and Rhythm: Normal rate and regular rhythm.      Heart sounds: No murmur heard.  Pulmonary:      Effort: Pulmonary effort is normal. No respiratory distress.      Breath sounds: Wheezing present.   Chest:      Comments: +R chest port  Abdominal:      General: Abdomen is flat. Bowel sounds are normal. There is no distension.      Palpations: Abdomen is soft.  There is no mass.      Tenderness: There is no abdominal tenderness. There is no guarding.   Musculoskeletal:         General: No swelling. Normal range of motion.      Cervical back: Normal range of motion.   Lymphadenopathy:      Cervical: No cervical adenopathy.   Skin:     General: Skin is warm and dry.      Findings: Petechiae present.      Comments: LLE    Neurological:      General: No focal deficit present.      Mental Status: She is alert and oriented to person, place, and time. Mental status is at baseline.   Psychiatric:         Mood and Affect: Mood normal.         PHQ-9 Score  PHQ-9 Total Score:       Pain Score  Vitals:    04/18/24 1327   BP: 112/54   Pulse: 57   Resp: 18   Temp: 97.7 °F (36.5 °C)   TempSrc: Oral   SpO2: 92%   PainSc: 0-No pain                                 PAINSCOREQUALITYMETRIC:   Vitals:    04/18/24 1327   PainSc: 0-No pain                        Lab Review  Lab Results   Component Value Date    WBC 7.00 04/18/2024    HGB 10.7 (L) 04/18/2024    HCT 34.5 04/18/2024    MCV 88.2 04/18/2024    RDW 12.0 (L) 04/18/2024     04/18/2024    NEUTRORELPCT 70.3 04/18/2024    LYMPHORELPCT 15.1 (L) 04/18/2024    MONORELPCT 8.0 04/18/2024    EOSRELPCT 5.3 04/18/2024    BASORELPCT 0.9 04/18/2024    NEUTROABS 4.92 04/18/2024    LYMPHSABS 1.06 04/18/2024     Lab Results   Component Value Date     04/18/2024    K 3.8 04/18/2024    CO2 27.3 04/18/2024     04/18/2024    BUN 13 04/18/2024    CREATININE 0.91 04/18/2024    EGFRIFNONA 51 (L) 11/22/2021    GLUCOSE 182 (H) 04/18/2024    CALCIUM 9.2 04/18/2024    ALKPHOS 157 (H) 04/18/2024    AST 12 04/18/2024    ALT 8 04/18/2024    BILITOT 0.3 04/18/2024    ALBUMIN 3.9 04/18/2024    PROTEINTOT 7.3 04/18/2024    MG 2.2 01/03/2024    PHOS 3.5 05/18/2020     Lab Results   Component Value Date    RETICCTPCT 4.26 (H) 08/16/2023     Lab Results   Component Value Date    FERRITIN 311.10 (H) 03/21/2024    IRON 50 03/21/2024    TIBC 326 03/21/2024     LABIRON 15 (L) 03/21/2024    [id number] 548 08/14/2023    FOLATE 12.90 08/14/2023        Radiology Results  CT Chest Without Contrast Diagnostic (12/31/2023 20:30)   IMPRESSION:  Spiculated nodule of the right upper lobe concerning for neoplasm.  Masslike consolidation in the right lower lobe could represent pneumonia  with underlying mass not excluded. Atelectasis/airspace disease in the  left lower lobe and lingula. Mediastinal adenopathy. Follow-up  recommended.    CT Chest With Contrast Diagnostic (12/19/2023 14:47)     IMPRESSION:  1.  Significant decrease size and masslike appearance of process in the  right upper lobe which would suggest significant response to treatment.  2.  No suspicious pulmonary nodules or masses identified.  3.  Mediastinal lymph nodes are now within normal limits for size.  No  hilar or mediastinal adenopathy.  4.  Mild centrilobular nodularity of the left lower lobe which may  reflect changes of atypical pneumonia or aspiration pneumonitis.  5.  Stable cardiomegaly.  6.  Other incidental/nonacute findings above.      CT Angiogram Chest Pulmonary Embolism (10/09/2023 16:41)   MPRESSION:  1.  Right upper lobe mass in association with pneumonia has increased in  size and extent when compared to the previous exam with extension to the  pleural surface. Masslike component is approximately 56.8 x 44.5 mm and  was previously 40.1 x 36.7 mm. This would correspond to primary  malignancy.  2.  Central bronchial wall thickening. Can be seen with reactive airway  disease or bronchitis.  3.  1.3 cm left adrenal nodule is noted. No significant change from  previous.  4.  Increased size of paratracheal and mediastinal lymph nodes. A right  paratracheal lymph node is 2.5 cm and was previously 2.2 cm.  5. No PE identified.  6. Marked cardiomegaly, stable.  7. Otherwise stable chest with other nonacute/incidental findings as  above.    CT Head Without Contrast (10/09/2023 12:00)   IMPRESSION:     Unremarkable exam demonstrating no CT evidence of acute intracranial  findings.      CT Chest Without Contrast Diagnostic (08/09/2023 12:38)       NM PET/CT Skull Base to Mid Thigh (06/20/2023 11:12)         CT Chest Low Dose Cancer Screening WO (02/14/2023 12:58)   IMPRESSION:    Interval development or enlargement of a 1.9 cm spiculated right upper  lobe pulmonary nodule concerning in appearance for malignancy and PET/CT  is recommended for further evaluation.        Pathology:     Tissue Pathology Exam (09/06/2023 08:13)         6/29/23           Assessment / Plan         Assessment and Plan   Mayra Hays is a 78 y.o. year old presents for       Non small cell lung cancer   Cancer Staging   Stage IIIA (cT1c, cN2, cM0)  -Oncology history as above. Patient with 1.9 cm spiculated right upper lobe nodule on low dose CT chest screen (2/2023). EBUS by Dr. Peter negative for malignancy (2/2023). PET/CT with hypermetabolism RUL 2.1 cm and pretracheal region lymph node (6/2023). CT guided biopsy negative for malignancy (6/2023). Navigational bronchoscopy with positive RUL and  4R (paratracheal) for squamous cell (9/6/2023). PD-L1 TPS score 60%.   -Patient not deemed for surgical resection. We reviewed her staging and treatment options which include concurrent chemo-radiation followed by adjuvant immunotherapy. Chemotherapy agents to be used would include weekly carboplatin AUC2 and paclitaxel 50 mg/m2. Chemo will be followed by one year of Durvalumab based on PACIFIC trial.   -Patient experienced anaphylaxis 8 minutes into the paclitaxel on first cycle 10/9. Due to this, paclitaxel will be omitted from weekly chemotherapy.  -Radiation therapy 10/10; 6000 cGy in 30 treatment fractions. Complete 11/21/2023  -Weekly carboplatin with concurrent radiation 10/18-11/21/2023  -Post treatment CT 12/19/23 with good response  -Cont maintenance durvalumab every 2 weeks; C7 today    2. Anemia; iron deficiency anemia due to GIB  -  Patient with normal H/H in our system 2/2023, with acute drop in Hgb 6/29 (10.7) to Hgb (6.5) on 8/9 requiring transfusion. Patient reports one episode of melena two days ago (she is a poor historian).   -Last colonoscopy and endoscopy are unknown; think it may have been >5 years ago  -CBC from 8/14 show Hgb 8.2, Hct 27, MCV 90. Normal WBC and platelet count. Iron studies with normal iron (37), TIBC (435), and low iron saturation (9). This is in light of recent blood transfusion. Normal folate and B12 level. Reticulocyte count noted to be elevated to 6%  -Patient is with fatigue. Denies shortness of breath (aside from baseline COPD) or chest pain. Continues to be on Eliquis for Afib  -Initial work up from consultation confirmed iron deficiency anemia, normal folate/b12 levels. No indication of hemolysis seen with normal LDH and haptoglobin. Myeloma work up has been negative with no M spike on serum protein electrophoresis and normal k/l free light chain ratio. Peripheral smear with normal total WBC without granulocytic dysplasia or blasts.  -Received Ferumoxytol 8/29/2023 and 11/27/23  -Pt to see surgery post treatment for repeat EGD and Colonoscopy     3. Malignancy associated pain  - Currently denies    4. Surveillance (per [facility])  -Surveillance would include CT with and without contrast every 3 to 6 months for 3 years, then every 6 months for 2 years, then low-dose noncontrast CT annually.   Next CT 4/2024, ordered today    5. Petechiae   - Distal lower extremities, non pruritic. Improving, only present LLE today  - Plt counts 267K today, cont to monitor      Discussed possible differential diagnoses, testing, treatment, recommended non-pharmacological interventions, risks, warning signs to monitor for that would indicate need for follow-up in clinic or ER. If no improvement with these regimens or you have new or worsening symptoms follow-up. Patient verbalizes understanding and agreement with plan of care. Denies  further needs or concerns.     Patient was given instructions and counseling regarding her condition and for health maintenance advised.       All questions were answered to her satisfaction.              Meds ordered during this visit  No orders of the defined types were placed in this encounter.      Visit Diagnoses    ICD-10-CM ICD-9-CM   1. Non-small cell cancer of right lung  C34.91 162.9                     Follow Up   In 1 month with tx with CBC, iron studies, ferritin, CMP  FU CT chest        This document has been electronically signed by Shawna Bond MD   April 18, 2024 15:30 EDT    Dictated Utilizing Dragon Dictation: Part of this note may be an electronic transcription/translation of spoken language to printed text using the Dragon Dictation System.

## 2024-05-01 ENCOUNTER — HOSPITAL ENCOUNTER (OUTPATIENT)
Dept: CT IMAGING | Facility: HOSPITAL | Age: 79
Discharge: HOME | End: 2024-05-01
Admitting: INTERNAL MEDICINE
Payer: MEDICARE

## 2024-05-01 ENCOUNTER — OFFICE VISIT (OUTPATIENT)
Dept: CARDIOLOGY | Facility: CLINIC | Age: 79
End: 2024-05-01
Payer: MEDICARE

## 2024-05-01 VITALS
SYSTOLIC BLOOD PRESSURE: 134 MMHG | BODY MASS INDEX: 33.62 KG/M2 | HEIGHT: 66 IN | OXYGEN SATURATION: 92 % | HEART RATE: 56 BPM | WEIGHT: 209.2 LBS | DIASTOLIC BLOOD PRESSURE: 65 MMHG

## 2024-05-01 DIAGNOSIS — C34.91 NON-SMALL CELL CANCER OF RIGHT LUNG: ICD-10-CM

## 2024-05-01 DIAGNOSIS — I50.32 CHRONIC HEART FAILURE WITH PRESERVED EJECTION FRACTION: Primary | Chronic | ICD-10-CM

## 2024-05-01 DIAGNOSIS — I10 ESSENTIAL HYPERTENSION: Chronic | ICD-10-CM

## 2024-05-01 DIAGNOSIS — I48.0 PAROXYSMAL ATRIAL FIBRILLATION: Chronic | ICD-10-CM

## 2024-05-01 PROCEDURE — 25510000001 IOPAMIDOL 61 % SOLUTION: Performed by: INTERNAL MEDICINE

## 2024-05-01 PROCEDURE — 71260 CT THORAX DX C+: CPT

## 2024-05-01 PROCEDURE — 71260 CT THORAX DX C+: CPT | Performed by: RADIOLOGY

## 2024-05-01 RX ADMIN — IOPAMIDOL 60 ML: 612 INJECTION, SOLUTION INTRAVENOUS at 09:02

## 2024-05-01 NOTE — PROGRESS NOTES
Niki Antony, APRN  Mayra Hays  1945 05/01/2024    Patient Active Problem List   Diagnosis    Chronic heart failure with preserved ejection fraction    Essential hypertension    Type 2 diabetes mellitus    Abnormal nuclear stress test with moderate to large size anteroapical and lateral wall myocardial ischemia.    Paroxysmal atrial fibrillation    Nocturnal hypoxia    Ventral hernia without obstruction or gangrene    Chronic obstructive pulmonary disease    Former smoker    Gait instability    Chronic respiratory failure with hypoxia    Cigarette nicotine dependence in remission    Acute anemia    Iron deficiency anemia    Malabsorption due to intolerance, not elsewhere classified    Lung mass    Other specified anemias    Primary cancer of right upper lobe of lung    Non-small cell cancer of right lung    Port-A-Cath in place    Hyperlipidemia LDL goal <100    Sepsis due to pneumonia       Dear Niki Antony APRN:    Subjective     History of Present Illness:    Chief Complaint   Patient presents with    Follow-up     ROUTINE WITH ECHO RESULTS       Mayra Hays is a pleasant 78 y.o. female with a past medical history significant for  no known CAD with recent LHC showing normal coronary arteries.  She does have diabetes mellitus, she does not currently smoke tobacco anymore but does have 112-year pack year history where she averaged 2 to 3 packs a day for 56 years.  She also has paroxysmal atrial fibrillation anticoagulated with Eliquis, severe COPD and follows with pulmonology, essential hypertension, and dyslipidemia. She also has non small cell lung cancer diagnosed in September 2023.  She comes in today for routine cardiology follow-up.     Mayra comes in today for follow-up after being seen in April 2023.  She was recently hospitalized in December 31 until January 4.  She was currently was hospitalized for URI with subsequent sepsis and pneumonia.  She did develop some atrial fibrillation  with RVR that was treated appropriately.  She comes in today doing well without any open complaints.  Upon further questioning she denies any chest pains, palpitations, or tachycardia.  She denies any awareness of arrhythmia such as atrial fibrillation.  She does have some chronic two-pillow orthopnea but reports this has been ongoing for several years and unchanged she denies any PND or pedal edema.  She is still undergoing chemotherapy for her lung cancer.    Allergies   Allergen Reactions    Paclitaxel Anaphylaxis   :      Current Outpatient Medications:     albuterol sulfate  (90 Base) MCG/ACT inhaler, Inhale 2 puffs Every 4 (Four) Hours As Needed for Wheezing., Disp: 18 g, Rfl: 5    apixaban (ELIQUIS) 5 MG tablet tablet, Take 1 tablet by mouth Every 12 (Twelve) Hours., Disp: 180 tablet, Rfl: 3    atorvastatin (LIPITOR) 20 MG tablet, Take 1 tablet by mouth Daily., Disp: 90 tablet, Rfl: 3    Budeson-Glycopyrrol-Formoterol (Breztri Aerosphere) 160-9-4.8 MCG/ACT aerosol inhaler, Inhale 2 puffs 2 (Two) Times a Day., Disp: 10.7 g, Rfl: 8    esomeprazole (nexIUM) 20 MG capsule, Take 1 capsule by mouth Daily., Disp: , Rfl:     hydrOXYzine pamoate (VISTARIL) 25 MG capsule, Take 1 capsule by mouth Every Night., Disp: , Rfl:     ipratropium-albuterol (DUO-NEB) 0.5-2.5 mg/3 ml nebulizer, USE 1 VIAL IN NEBULIZER 4 TIMES DAILY - and as needed, Disp: 320 mL, Rfl: 11    lidocaine-prilocaine (EMLA) 2.5-2.5 % cream, Apply to port-a-cath site 30 minutes prior to arrival at infusion center. Cover with plastic wrap., Disp: 30 g, Rfl: 1    lisinopril (PRINIVIL,ZESTRIL) 5 MG tablet, Take 1 tablet by mouth Daily., Disp: 90 tablet, Rfl: 3    metFORMIN (GLUCOPHAGE) 500 MG tablet, Take 1 tablet by mouth 2 (Two) Times a Day As Needed (only takes if blood glucose is above 200). As needed, Disp: , Rfl:     prochlorperazine (COMPAZINE) 10 MG tablet, Take 1 tablet by mouth Every 6 (Six) Hours As Needed for Nausea or Vomiting., Disp: 30  "tablet, Rfl: 1    spironolactone (ALDACTONE) 25 MG tablet, Take 1 tablet by mouth Daily., Disp: , Rfl:     insulin glargine (LANTUS, SEMGLEE) 100 UNIT/ML injection, Inject 15 Units under the skin into the appropriate area as directed Daily. (Patient not taking: Reported on 2024), Disp: 1 mL, Rfl: 12  No current facility-administered medications for this visit.    Facility-Administered Medications Ordered in Other Visits:     heparin injection 500 Units, 500 Units, Intravenous, PRN, Bailee Gan APRN, 500 Units at 24 1558    sodium chloride 0.9 % flush 10 mL, 10 mL, Intravenous, PRN, Bailee Gan, APRN    The following portions of the patient's history were reviewed and updated as appropriate: allergies, current medications, past family history, past medical history, past social history, past surgical history and problem list.    Social History     Tobacco Use    Smoking status: Former     Current packs/day: 0.00     Average packs/day: 3.0 packs/day for 16.7 years (50.2 ttl pk-yrs)     Types: Cigarettes     Start date: 4/3/1998     Quit date: 2015     Years since quittin.3     Passive exposure: Past    Smokeless tobacco: Never   Vaping Use    Vaping status: Never Used   Substance Use Topics    Alcohol use: No    Drug use: No         Objective   Vitals:    24 0911   BP: 134/65   Pulse: 56   SpO2: 92%   Weight: 94.9 kg (209 lb 3.2 oz)   Height: 167.6 cm (66\")     Body mass index is 33.77 kg/m².    ROS    Constitutional:       General: Not in acute distress.     Appearance: Healthy appearance. Well-developed and not in distress. Not diaphoretic.   Eyes:      Conjunctiva/sclera: Conjunctivae normal.      Pupils: Pupils are equal, round, and reactive to light.   HENT:      Head: Normocephalic and atraumatic.   Neck:      Vascular: No carotid bruit or JVD.   Pulmonary:      Effort: Pulmonary effort is normal. No respiratory distress.      Breath sounds: Wheezing present.      " Comments: Diminished throughout  Cardiovascular:      Normal rate. Regular rhythm.   Edema:     Peripheral edema absent.   Skin:     General: Skin is cool.   Neurological:      Mental Status: Alert, oriented to person, place, and time and oriented to person, place and time.         Lab Results   Component Value Date     04/18/2024    K 3.8 04/18/2024     04/18/2024    CO2 27.3 04/18/2024    BUN 13 04/18/2024    CREATININE 0.91 04/18/2024    GLUCOSE 182 (H) 04/18/2024    CALCIUM 9.2 04/18/2024    AST 12 04/18/2024    ALT 8 04/18/2024    ALKPHOS 157 (H) 04/18/2024    LABIL2 1.1 08/16/2023     Lab Results   Component Value Date    CKTOTAL 14 (L) 06/25/2018     Lab Results   Component Value Date    WBC 7.00 04/18/2024    HGB 10.7 (L) 04/18/2024    HCT 34.5 04/18/2024     04/18/2024     Lab Results   Component Value Date    INR 0.99 09/05/2023    INR 1.40 (H) 08/09/2023    INR 0.97 06/29/2023     Lab Results   Component Value Date    MG 2.2 01/03/2024     Lab Results   Component Value Date    TSH 0.542 04/18/2024    TRIG 106 07/28/2022    HDL 52 07/28/2022    LDL 96 07/28/2022      Lab Results   Component Value Date    BNP 29.0 02/01/2019       During this visit the following were done:  Labs Reviewed []    Labs Ordered []    Radiology Reports Reviewed []    Radiology Ordered []    PCP Records Reviewed []    Referring Provider Records Reviewed []    ER Records Reviewed []    Hospital Records Reviewed []    History Obtained From Family []    Radiology Images Reviewed []    Other Reviewed []    Records Requested []         ECG 12 Lead    Date/Time: 5/1/2024 9:45 AM  Performed by: Darek Nelson PA-C    Authorized by: Darek Nelson PA-C  Comparison: compared with previous ECG   Comparison to previous ECG: Patient now in sinus rhythm with rate of 49 bpm  Conduction: non-specific intraventricular conduction delay    Clinical impression: non-specific ECG          Assessment & Plan    Diagnosis Plan    1. Chronic heart failure with preserved ejection fraction        2. Essential hypertension        3. Paroxysmal atrial fibrillation                 Recommendations:  Paroxysmal atrial fibrillation  Patient in sinus rhythm today fairly bradycardic with ventricular rate 49 bpm on EKG.  Thankfully he is fairly asymptomatic however I am going to stop her metoprolol tartrate because of this bradycardia she would be high risk of falls.  Continue Eliquis for anticoagulation she denies any bleeding issues  Chronic HFpEF  Currently euvolemic denies any worsening shortness of breath from baseline  Continue lisinopril, spironolactone.    Return in about 6 months (around 11/1/2024).    As always, I appreciate very much the opportunity to participate in the cardiovascular care of your patients.      With Best Regards,    Darek Nelson PA-C

## 2024-05-02 ENCOUNTER — INFUSION (OUTPATIENT)
Dept: ONCOLOGY | Facility: HOSPITAL | Age: 79
End: 2024-05-02
Payer: MEDICARE

## 2024-05-02 ENCOUNTER — APPOINTMENT (OUTPATIENT)
Dept: ONCOLOGY | Facility: HOSPITAL | Age: 79
End: 2024-05-02
Payer: MEDICARE

## 2024-05-02 VITALS
BODY MASS INDEX: 33.73 KG/M2 | OXYGEN SATURATION: 92 % | TEMPERATURE: 97.6 F | SYSTOLIC BLOOD PRESSURE: 123 MMHG | DIASTOLIC BLOOD PRESSURE: 52 MMHG | HEART RATE: 65 BPM | RESPIRATION RATE: 18 BRPM | WEIGHT: 209 LBS

## 2024-05-02 DIAGNOSIS — C34.11 PRIMARY CANCER OF RIGHT UPPER LOBE OF LUNG: ICD-10-CM

## 2024-05-02 DIAGNOSIS — C34.11 PRIMARY CANCER OF RIGHT UPPER LOBE OF LUNG: Primary | ICD-10-CM

## 2024-05-02 DIAGNOSIS — Z95.828 PORT-A-CATH IN PLACE: ICD-10-CM

## 2024-05-02 DIAGNOSIS — C34.91 NON-SMALL CELL CANCER OF RIGHT LUNG: ICD-10-CM

## 2024-05-02 DIAGNOSIS — C34.91 NON-SMALL CELL CANCER OF RIGHT LUNG: Primary | ICD-10-CM

## 2024-05-02 LAB
ALBUMIN SERPL-MCNC: 3.9 G/DL (ref 3.5–5.2)
ALBUMIN/GLOB SERPL: 1.2 G/DL
ALP SERPL-CCNC: 149 U/L (ref 39–117)
ALT SERPL W P-5'-P-CCNC: 9 U/L (ref 1–33)
ANION GAP SERPL CALCULATED.3IONS-SCNC: 8.9 MMOL/L (ref 5–15)
AST SERPL-CCNC: 13 U/L (ref 1–32)
BASOPHILS # BLD AUTO: 0.06 10*3/MM3 (ref 0–0.2)
BASOPHILS NFR BLD AUTO: 0.9 % (ref 0–1.5)
BILIRUB SERPL-MCNC: 0.2 MG/DL (ref 0–1.2)
BUN SERPL-MCNC: 14 MG/DL (ref 8–23)
BUN/CREAT SERPL: 16.7 (ref 7–25)
CALCIUM SPEC-SCNC: 9.1 MG/DL (ref 8.6–10.5)
CHLORIDE SERPL-SCNC: 104 MMOL/L (ref 98–107)
CO2 SERPL-SCNC: 29.1 MMOL/L (ref 22–29)
CREAT SERPL-MCNC: 0.84 MG/DL (ref 0.57–1)
DEPRECATED RDW RBC AUTO: 38.3 FL (ref 37–54)
EGFRCR SERPLBLD CKD-EPI 2021: 71.7 ML/MIN/1.73
EOSINOPHIL # BLD AUTO: 0.32 10*3/MM3 (ref 0–0.4)
EOSINOPHIL NFR BLD AUTO: 4.9 % (ref 0.3–6.2)
ERYTHROCYTE [DISTWIDTH] IN BLOOD BY AUTOMATED COUNT: 11.9 % (ref 12.3–15.4)
GLOBULIN UR ELPH-MCNC: 3.3 GM/DL
GLUCOSE SERPL-MCNC: 140 MG/DL (ref 65–99)
HCT VFR BLD AUTO: 34.6 % (ref 34–46.6)
HGB BLD-MCNC: 10.9 G/DL (ref 12–15.9)
IMM GRANULOCYTES # BLD AUTO: 0.03 10*3/MM3 (ref 0–0.05)
IMM GRANULOCYTES NFR BLD AUTO: 0.5 % (ref 0–0.5)
LYMPHOCYTES # BLD AUTO: 1.01 10*3/MM3 (ref 0.7–3.1)
LYMPHOCYTES NFR BLD AUTO: 15.5 % (ref 19.6–45.3)
MCH RBC QN AUTO: 27.5 PG (ref 26.6–33)
MCHC RBC AUTO-ENTMCNC: 31.5 G/DL (ref 31.5–35.7)
MCV RBC AUTO: 87.2 FL (ref 79–97)
MONOCYTES # BLD AUTO: 0.58 10*3/MM3 (ref 0.1–0.9)
MONOCYTES NFR BLD AUTO: 8.9 % (ref 5–12)
NEUTROPHILS NFR BLD AUTO: 4.53 10*3/MM3 (ref 1.7–7)
NEUTROPHILS NFR BLD AUTO: 69.3 % (ref 42.7–76)
NRBC BLD AUTO-RTO: 0 /100 WBC (ref 0–0.2)
PLATELET # BLD AUTO: 244 10*3/MM3 (ref 140–450)
PMV BLD AUTO: 10.2 FL (ref 6–12)
POTASSIUM SERPL-SCNC: 3.7 MMOL/L (ref 3.5–5.2)
PROT SERPL-MCNC: 7.2 G/DL (ref 6–8.5)
RBC # BLD AUTO: 3.97 10*6/MM3 (ref 3.77–5.28)
SODIUM SERPL-SCNC: 142 MMOL/L (ref 136–145)
WBC NRBC COR # BLD AUTO: 6.53 10*3/MM3 (ref 3.4–10.8)

## 2024-05-02 PROCEDURE — 25010000002 DURVALUMAB 500 MG/10ML SOLUTION 10 ML VIAL: Performed by: INTERNAL MEDICINE

## 2024-05-02 PROCEDURE — 80053 COMPREHEN METABOLIC PANEL: CPT

## 2024-05-02 PROCEDURE — 96413 CHEMO IV INFUSION 1 HR: CPT

## 2024-05-02 PROCEDURE — 85025 COMPLETE CBC W/AUTO DIFF WBC: CPT

## 2024-05-02 PROCEDURE — 25810000003 SODIUM CHLORIDE 0.9 % SOLUTION: Performed by: INTERNAL MEDICINE

## 2024-05-02 PROCEDURE — 25810000003 SODIUM CHLORIDE 0.9 % SOLUTION 250 ML FLEX CONT: Performed by: INTERNAL MEDICINE

## 2024-05-02 PROCEDURE — 25010000002 HEPARIN LOCK FLUSH PER 10 UNITS: Performed by: NURSE PRACTITIONER

## 2024-05-02 RX ORDER — SODIUM CHLORIDE 9 MG/ML
250 INJECTION, SOLUTION INTRAVENOUS ONCE
Status: CANCELLED | OUTPATIENT
Start: 2024-05-02

## 2024-05-02 RX ORDER — HEPARIN SODIUM (PORCINE) LOCK FLUSH IV SOLN 100 UNIT/ML 100 UNIT/ML
300 SOLUTION INTRAVENOUS ONCE
OUTPATIENT
Start: 2024-05-02

## 2024-05-02 RX ORDER — SODIUM CHLORIDE 0.9 % (FLUSH) 0.9 %
10 SYRINGE (ML) INJECTION AS NEEDED
Status: DISCONTINUED | OUTPATIENT
Start: 2024-05-02 | End: 2024-05-02 | Stop reason: HOSPADM

## 2024-05-02 RX ORDER — SODIUM CHLORIDE 0.9 % (FLUSH) 0.9 %
20 SYRINGE (ML) INJECTION AS NEEDED
OUTPATIENT
Start: 2024-05-02

## 2024-05-02 RX ORDER — SODIUM CHLORIDE 0.9 % (FLUSH) 0.9 %
10 SYRINGE (ML) INJECTION AS NEEDED
OUTPATIENT
Start: 2024-05-02

## 2024-05-02 RX ORDER — HEPARIN SODIUM (PORCINE) LOCK FLUSH IV SOLN 100 UNIT/ML 100 UNIT/ML
500 SOLUTION INTRAVENOUS AS NEEDED
OUTPATIENT
Start: 2024-05-02

## 2024-05-02 RX ORDER — SODIUM CHLORIDE 9 MG/ML
250 INJECTION, SOLUTION INTRAVENOUS ONCE
OUTPATIENT
Start: 2024-05-30

## 2024-05-02 RX ORDER — SODIUM CHLORIDE 9 MG/ML
250 INJECTION, SOLUTION INTRAVENOUS ONCE
OUTPATIENT
Start: 2024-05-16

## 2024-05-02 RX ORDER — SODIUM CHLORIDE 9 MG/ML
250 INJECTION, SOLUTION INTRAVENOUS ONCE
Status: COMPLETED | OUTPATIENT
Start: 2024-05-02 | End: 2024-05-02

## 2024-05-02 RX ORDER — HEPARIN SODIUM (PORCINE) LOCK FLUSH IV SOLN 100 UNIT/ML 100 UNIT/ML
500 SOLUTION INTRAVENOUS AS NEEDED
Status: DISCONTINUED | OUTPATIENT
Start: 2024-05-02 | End: 2024-05-02 | Stop reason: HOSPADM

## 2024-05-02 RX ADMIN — Medication 500 UNITS: at 17:32

## 2024-05-02 RX ADMIN — SODIUM CHLORIDE 250 ML: 9 INJECTION, SOLUTION INTRAVENOUS at 16:22

## 2024-05-02 RX ADMIN — Medication 10 ML: at 17:32

## 2024-05-02 RX ADMIN — SODIUM CHLORIDE 950 MG: 9 INJECTION, SOLUTION INTRAVENOUS at 16:22

## 2024-05-16 ENCOUNTER — INFUSION (OUTPATIENT)
Dept: ONCOLOGY | Facility: HOSPITAL | Age: 79
End: 2024-05-16
Payer: MEDICARE

## 2024-05-16 ENCOUNTER — OFFICE VISIT (OUTPATIENT)
Dept: ONCOLOGY | Facility: CLINIC | Age: 79
End: 2024-05-16
Payer: MEDICARE

## 2024-05-16 ENCOUNTER — LAB (OUTPATIENT)
Dept: ONCOLOGY | Facility: HOSPITAL | Age: 79
End: 2024-05-16
Payer: MEDICARE

## 2024-05-16 VITALS
RESPIRATION RATE: 18 BRPM | OXYGEN SATURATION: 93 % | HEART RATE: 73 BPM | DIASTOLIC BLOOD PRESSURE: 53 MMHG | SYSTOLIC BLOOD PRESSURE: 127 MMHG | TEMPERATURE: 97.5 F

## 2024-05-16 VITALS
DIASTOLIC BLOOD PRESSURE: 53 MMHG | TEMPERATURE: 97.5 F | SYSTOLIC BLOOD PRESSURE: 127 MMHG | OXYGEN SATURATION: 93 % | RESPIRATION RATE: 18 BRPM | HEART RATE: 73 BPM

## 2024-05-16 DIAGNOSIS — K90.49 MALABSORPTION DUE TO INTOLERANCE, NOT ELSEWHERE CLASSIFIED: ICD-10-CM

## 2024-05-16 DIAGNOSIS — Z95.828 PORT-A-CATH IN PLACE: ICD-10-CM

## 2024-05-16 DIAGNOSIS — C34.11 PRIMARY CANCER OF RIGHT UPPER LOBE OF LUNG: ICD-10-CM

## 2024-05-16 DIAGNOSIS — C34.11 PRIMARY CANCER OF RIGHT UPPER LOBE OF LUNG: Primary | ICD-10-CM

## 2024-05-16 DIAGNOSIS — D50.9 IRON DEFICIENCY ANEMIA, UNSPECIFIED IRON DEFICIENCY ANEMIA TYPE: ICD-10-CM

## 2024-05-16 DIAGNOSIS — C34.91 NON-SMALL CELL CANCER OF RIGHT LUNG: Primary | ICD-10-CM

## 2024-05-16 DIAGNOSIS — R53.83 OTHER FATIGUE: ICD-10-CM

## 2024-05-16 DIAGNOSIS — C34.91 NON-SMALL CELL CANCER OF RIGHT LUNG: ICD-10-CM

## 2024-05-16 LAB
ALBUMIN SERPL-MCNC: 3.9 G/DL (ref 3.5–5.2)
ALBUMIN/GLOB SERPL: 1 G/DL
ALP SERPL-CCNC: 121 U/L (ref 39–117)
ALT SERPL W P-5'-P-CCNC: 9 U/L (ref 1–33)
ANION GAP SERPL CALCULATED.3IONS-SCNC: 10 MMOL/L (ref 5–15)
AST SERPL-CCNC: 14 U/L (ref 1–32)
BASOPHILS # BLD AUTO: 0.04 10*3/MM3 (ref 0–0.2)
BASOPHILS NFR BLD AUTO: 0.6 % (ref 0–1.5)
BILIRUB SERPL-MCNC: 0.3 MG/DL (ref 0–1.2)
BUN SERPL-MCNC: 13 MG/DL (ref 8–23)
BUN/CREAT SERPL: 13.7 (ref 7–25)
CALCIUM SPEC-SCNC: 9.8 MG/DL (ref 8.6–10.5)
CHLORIDE SERPL-SCNC: 100 MMOL/L (ref 98–107)
CO2 SERPL-SCNC: 30 MMOL/L (ref 22–29)
CREAT SERPL-MCNC: 0.95 MG/DL (ref 0.57–1)
DEPRECATED RDW RBC AUTO: 37.2 FL (ref 37–54)
EGFRCR SERPLBLD CKD-EPI 2021: 61.8 ML/MIN/1.73
EOSINOPHIL # BLD AUTO: 0.26 10*3/MM3 (ref 0–0.4)
EOSINOPHIL NFR BLD AUTO: 3.9 % (ref 0.3–6.2)
ERYTHROCYTE [DISTWIDTH] IN BLOOD BY AUTOMATED COUNT: 12 % (ref 12.3–15.4)
FERRITIN SERPL-MCNC: 250.7 NG/ML (ref 13–150)
GLOBULIN UR ELPH-MCNC: 3.9 GM/DL
GLUCOSE SERPL-MCNC: 133 MG/DL (ref 65–99)
HCT VFR BLD AUTO: 35.1 % (ref 34–46.6)
HGB BLD-MCNC: 10.9 G/DL (ref 12–15.9)
IMM GRANULOCYTES # BLD AUTO: 0.07 10*3/MM3 (ref 0–0.05)
IMM GRANULOCYTES NFR BLD AUTO: 1 % (ref 0–0.5)
IRON 24H UR-MRATE: 31 MCG/DL (ref 37–145)
IRON SATN MFR SERPL: 10 % (ref 20–50)
LYMPHOCYTES # BLD AUTO: 1.15 10*3/MM3 (ref 0.7–3.1)
LYMPHOCYTES NFR BLD AUTO: 17.2 % (ref 19.6–45.3)
MCH RBC QN AUTO: 26.4 PG (ref 26.6–33)
MCHC RBC AUTO-ENTMCNC: 31.1 G/DL (ref 31.5–35.7)
MCV RBC AUTO: 85 FL (ref 79–97)
MONOCYTES # BLD AUTO: 0.64 10*3/MM3 (ref 0.1–0.9)
MONOCYTES NFR BLD AUTO: 9.6 % (ref 5–12)
NEUTROPHILS NFR BLD AUTO: 4.53 10*3/MM3 (ref 1.7–7)
NEUTROPHILS NFR BLD AUTO: 67.7 % (ref 42.7–76)
NRBC BLD AUTO-RTO: 0 /100 WBC (ref 0–0.2)
PLATELET # BLD AUTO: 302 10*3/MM3 (ref 140–450)
PMV BLD AUTO: 10.2 FL (ref 6–12)
POTASSIUM SERPL-SCNC: 4 MMOL/L (ref 3.5–5.2)
PROT SERPL-MCNC: 7.8 G/DL (ref 6–8.5)
RBC # BLD AUTO: 4.13 10*6/MM3 (ref 3.77–5.28)
SODIUM SERPL-SCNC: 140 MMOL/L (ref 136–145)
TIBC SERPL-MCNC: 297 MCG/DL (ref 298–536)
TRANSFERRIN SERPL-MCNC: 199 MG/DL (ref 200–360)
WBC NRBC COR # BLD AUTO: 6.69 10*3/MM3 (ref 3.4–10.8)

## 2024-05-16 PROCEDURE — 83540 ASSAY OF IRON: CPT

## 2024-05-16 PROCEDURE — 1159F MED LIST DOCD IN RCRD: CPT | Performed by: NURSE PRACTITIONER

## 2024-05-16 PROCEDURE — 3074F SYST BP LT 130 MM HG: CPT | Performed by: NURSE PRACTITIONER

## 2024-05-16 PROCEDURE — 96413 CHEMO IV INFUSION 1 HR: CPT

## 2024-05-16 PROCEDURE — 1126F AMNT PAIN NOTED NONE PRSNT: CPT | Performed by: NURSE PRACTITIONER

## 2024-05-16 PROCEDURE — 1160F RVW MEDS BY RX/DR IN RCRD: CPT | Performed by: NURSE PRACTITIONER

## 2024-05-16 PROCEDURE — 82728 ASSAY OF FERRITIN: CPT

## 2024-05-16 PROCEDURE — 25810000003 SODIUM CHLORIDE 0.9 % SOLUTION 250 ML FLEX CONT: Performed by: INTERNAL MEDICINE

## 2024-05-16 PROCEDURE — 25010000002 HEPARIN LOCK FLUSH PER 10 UNITS: Performed by: NURSE PRACTITIONER

## 2024-05-16 PROCEDURE — 80053 COMPREHEN METABOLIC PANEL: CPT

## 2024-05-16 PROCEDURE — 99214 OFFICE O/P EST MOD 30 MIN: CPT | Performed by: NURSE PRACTITIONER

## 2024-05-16 PROCEDURE — 85025 COMPLETE CBC W/AUTO DIFF WBC: CPT

## 2024-05-16 PROCEDURE — 25010000002 DURVALUMAB 500 MG/10ML SOLUTION 10 ML VIAL: Performed by: INTERNAL MEDICINE

## 2024-05-16 PROCEDURE — 84466 ASSAY OF TRANSFERRIN: CPT

## 2024-05-16 PROCEDURE — 25810000003 SODIUM CHLORIDE 0.9 % SOLUTION: Performed by: INTERNAL MEDICINE

## 2024-05-16 PROCEDURE — 3078F DIAST BP <80 MM HG: CPT | Performed by: NURSE PRACTITIONER

## 2024-05-16 RX ORDER — HEPARIN SODIUM (PORCINE) LOCK FLUSH IV SOLN 100 UNIT/ML 100 UNIT/ML
500 SOLUTION INTRAVENOUS AS NEEDED
Status: DISCONTINUED | OUTPATIENT
Start: 2024-05-16 | End: 2024-05-16 | Stop reason: HOSPADM

## 2024-05-16 RX ORDER — HEPARIN SODIUM (PORCINE) LOCK FLUSH IV SOLN 100 UNIT/ML 100 UNIT/ML
300 SOLUTION INTRAVENOUS ONCE
OUTPATIENT
Start: 2024-05-16

## 2024-05-16 RX ORDER — SODIUM CHLORIDE 0.9 % (FLUSH) 0.9 %
10 SYRINGE (ML) INJECTION AS NEEDED
OUTPATIENT
Start: 2024-05-16

## 2024-05-16 RX ORDER — SODIUM CHLORIDE 0.9 % (FLUSH) 0.9 %
20 SYRINGE (ML) INJECTION AS NEEDED
OUTPATIENT
Start: 2024-05-16

## 2024-05-16 RX ORDER — SODIUM CHLORIDE 0.9 % (FLUSH) 0.9 %
10 SYRINGE (ML) INJECTION AS NEEDED
Status: DISCONTINUED | OUTPATIENT
Start: 2024-05-16 | End: 2024-05-16 | Stop reason: HOSPADM

## 2024-05-16 RX ORDER — HEPARIN SODIUM (PORCINE) LOCK FLUSH IV SOLN 100 UNIT/ML 100 UNIT/ML
500 SOLUTION INTRAVENOUS AS NEEDED
OUTPATIENT
Start: 2024-05-16

## 2024-05-16 RX ORDER — SODIUM CHLORIDE 9 MG/ML
250 INJECTION, SOLUTION INTRAVENOUS ONCE
Status: COMPLETED | OUTPATIENT
Start: 2024-05-16 | End: 2024-05-16

## 2024-05-16 RX ADMIN — DURVALUMAB 1000 MG: 500 INJECTION, SOLUTION INTRAVENOUS at 14:38

## 2024-05-16 RX ADMIN — SODIUM CHLORIDE 250 ML: 9 INJECTION, SOLUTION INTRAVENOUS at 14:38

## 2024-05-16 RX ADMIN — Medication 500 UNITS: at 15:45

## 2024-05-16 RX ADMIN — Medication 10 ML: at 15:45

## 2024-05-16 NOTE — PROGRESS NOTES
Subjective     Date: 2024    Referring Provider  No ref. provider found    Chief Complaint  Symptomatic anemia   2. Non small Cell Lung Cancer  Cancer Staging   Stage IIIA (cT1c, cN2, cM0)        Subjective      Mayra Hays is a 78 y.o. female who presents today to [facility] for follow up.    HPI:   78 y.o. female with past medical history of diabetes mellitus type 2, hyperlipidemia, COPD, hypertension, atrial fibrillation on anticoagulation (Eliquis), h/o CVA and previous tobacco use presents for symptomatic anemia and NSCLC.    She is present today with her daughter, Hailey, who is assisting with history. Pt started work up for RUL lung nodule , since then noticed to have a decrease in her Hgb to 10.7 from normal in 2023. More recently, her Hgb 8/9 was 6.5, she was directed to ED where she received 1U PRBC.     Patient reports increased fatigue over the last several months. She reports no bleeding, however does report dark colored stool (melena) two days ago. Denies any other evidence of melena or hematochezia.     She has previously been diagnosed with iron deficiency (years ago) requiring oral iron supplements, but never received IV iron or bone marrow biopsy.    She and her daughter are unsure of her last colonoscopy and endoscopy history, think it may have occurred >5 years ago but unsure.    She denies recent weight loss, fever, chills,  night sweats.  Previous smoker, quit 5-6 years ago, smoked 3 packs/day X 30 years. Denies alcohol or drug use. Family history significant for son with leukemia, brothers with lung cancer.     Oncology History:  2023: low dose CT chest screenin.9 cm spiculated right upper lobe pulmonary nodule concerning for malignancy, PET/CT recommended.   2023: EBUS by Dr. Peter negative for malignancy for bronchial washing and FNA of station 10R  2023: PET/CT: Right upper lobe pulmonary nodule, more  posterior possible satellite nodules are postobstructive process measuring 2.1 cm with mSUV 14.5. Also with pretracheal region lymph node 2.6 cm.   6/29/2023: CT guided needle biopsy was non diagnostic, showed fibrosis and chronic inflammation.   8/9/2023: Lab work showed anemia with Hgb 6.5. Referred to ED. Bronchoscopy deferred.   9/6/2023: Navigational bronchoscopy performed by Dr. Clemens- Right upper lobe lung transbronchial biopsy revealed squamous cell carcinoma, lymph node at station 4R also involved with squamous cell carcinoma. PD-L1 TPS 60%. Patient not deemed a surgical candidate.  9/13-11/21/2023: Received weekly carboplatin  X 7 and radiation. Patient had a reaction to paclitaxel, it was omitted.   12/19/2023: Post treatment CT chest with improvement of masslike process RUL now 1.6 cm, suggest significant response to treatment. Mediastinal lymph nodes are now within normal limits of size.     Ms. Hays presents today with her daughter, grand daughter, and great grand daughters. She is in a wheelchair. She lives alone with her grand son, able to perform her ADLs including cooking, cleaning.     Treatment history:        2.       Interval History 10/11/2023   Ms. Hays presents for follow up today, accompanied by her daughter in law. She started treatment with paclitaxel and carboplatin on 10/9, unfortunately had a anaphylactic reaction to taxol after 8 minutes of infusion causing her to go to the ED. Patient was awake and feeling back to herself at the time of ED visit. Carboplatin was not administered.     She reports doing well overall, no new symptoms. Started radiation therapy yesterday.      Interval History 10/24/2023   Ms. Hays presents for cycle 2 of carboplatin with concurrent radiation. Did well with first cycle. Denies any adverse effects including nausea, vomiting, diarrhea, neuropathy. Appetite is good. Radiation is going well. No complaints today.  Denies any skin rash or  bites.    Interval History 11/21/2023   Ms. Hays presents today for cycle 7 of carboplatin with concurrent radiation. She reports good tolerance thus far without neuropathy, nausea, vomiting, diarrhea. Appetite is stable.   Denies any GIB.    Interval History 01/11/2024   Ms. Hays presents after completion of therapy. She was admitted to the hospital 12/31 due to shortness of breath, found to have Afib with RVR and coronavirus HKU. She received inpatient antibiotics and steroids. Currently with improvement, still reports some fatigue.   We reviewed her last CT chest, which shows improvement after treatment. Discussed continuing immunotherapy.      Interval History 02/22/2024   Ms. Hays presents today for follow, prior to cycle 3 of maintenance durvalumab. Accompanied by her grand daughter's fiance. She reports improved energy. Denies any bleeding, nausea, vomiting, diarrhea, rash. On examination, noted to have petechia on bilateral distal lower extremities and R distal upper extremities, she believes these started today. Denies pruritus.     Interval History 03/21/2024    presents prior to C5 of consolidation therapy with durvalumab. She reports good tolerance to the last treatment, denies shortness of breath, diarrhea, nausea/vomiting, fatigue. She's been applying lotion to her legs, petechia has decreased, primarily just on her left leg now.     Interval History 04/18/2024   Ms. Hays presents prior to C7 of durvalumab. Reports good tolerance without nausea, vomiting, diarrhea. No changes in petechia. Denies any other complaints.    Interval History 05/16/2024  Ms. Hays presents today prior to cycle 9 durvalumab. She denies any worsening shortness of breath or difficulty breathing. No nausea/vomiting or diarrhea. She continues to have petechia on bilateral lower extremities only. She reports a good appetite and hydrating well. She is without any specific complaints at this time.     Objective      Objective     Allergy:   Allergies   Allergen Reactions    Paclitaxel Anaphylaxis        Current Medications:   Current Outpatient Medications   Medication Sig Dispense Refill    albuterol sulfate  (90 Base) MCG/ACT inhaler Inhale 2 puffs Every 4 (Four) Hours As Needed for Wheezing. 18 g 5    apixaban (ELIQUIS) 5 MG tablet tablet Take 1 tablet by mouth Every 12 (Twelve) Hours. 180 tablet 3    atorvastatin (LIPITOR) 20 MG tablet Take 1 tablet by mouth Daily. 90 tablet 3    Budeson-Glycopyrrol-Formoterol (Breztri Aerosphere) 160-9-4.8 MCG/ACT aerosol inhaler Inhale 2 puffs 2 (Two) Times a Day. 10.7 g 8    esomeprazole (nexIUM) 20 MG capsule Take 1 capsule by mouth Daily.      hydrOXYzine pamoate (VISTARIL) 25 MG capsule Take 1 capsule by mouth Every Night.      insulin glargine (LANTUS, SEMGLEE) 100 UNIT/ML injection Inject 15 Units under the skin into the appropriate area as directed Daily. (Patient not taking: Reported on 5/1/2024) 1 mL 12    ipratropium-albuterol (DUO-NEB) 0.5-2.5 mg/3 ml nebulizer USE 1 VIAL IN NEBULIZER 4 TIMES DAILY - and as needed 320 mL 11    lidocaine-prilocaine (EMLA) 2.5-2.5 % cream Apply to port-a-cath site 30 minutes prior to arrival at infusion center. Cover with plastic wrap. 30 g 1    lisinopril (PRINIVIL,ZESTRIL) 5 MG tablet Take 1 tablet by mouth Daily. 90 tablet 3    metFORMIN (GLUCOPHAGE) 500 MG tablet Take 1 tablet by mouth 2 (Two) Times a Day As Needed (only takes if blood glucose is above 200). As needed      prochlorperazine (COMPAZINE) 10 MG tablet Take 1 tablet by mouth Every 6 (Six) Hours As Needed for Nausea or Vomiting. 30 tablet 1    spironolactone (ALDACTONE) 25 MG tablet Take 1 tablet by mouth Daily.       No current facility-administered medications for this visit.     Facility-Administered Medications Ordered in Other Visits   Medication Dose Route Frequency Provider Last Rate Last Admin    Durvalumab 1,000 mg in sodium chloride 0.9 % 270 mL chemo IVPB   1,000 mg Intravenous Once Shawna Bond MD        heparin injection 500 Units  500 Units Intravenous PRN Bailee Gan APRN   500 Units at 01/26/24 1558    sodium chloride 0.9 % flush 10 mL  10 mL Intravenous PRN Bailee Gan APRN        sodium chloride 0.9 % infusion 250 mL  250 mL Intravenous Once Shawna Bond MD           Past Medical History:  Past Medical History:   Diagnosis Date    Arthritis     CHF (congestive heart failure)     Chronic anticoagulation     Eliquis    Chronic kidney disease     stage II/IIIa    Collapsed lung     COPD (chronic obstructive pulmonary disease)     Diabetes mellitus     TYPE 2    Elevated cholesterol     GERD (gastroesophageal reflux disease)     Hyperlipidemia     Hypertension     Non-small cell lung cancer RUL     Sleep apnea     non compliant with CPAP    Stroke 2019    Wears eyeglasses        Past Surgical History:  Past Surgical History:   Procedure Laterality Date    BRONCHOSCOPY Bilateral 02/27/2023    Procedure: BRONCHOSCOPY WITH ENDOBRONCHIAL ULTRASOUND;  Surgeon: Abdulkadir Peter MD;  Location: [facility];  Service: Pulmonary;  Laterality: Bilateral;    BRONCHOSCOPY WITH ION ROBOTIC ASSIST N/A 9/6/2023    Procedure: BRONCHOSCOPY WITH ION ROBOT AND EBUS;  Surgeon: John Clemens MD;  Location:  [facility];  Service: Robotics - Pulmonary;  Laterality: N/A;  Ion cath #6 - 0032,  #6 - 0030, cath guide # 0077. EBUS scope removed with balloon intact.    CARDIAC CATHETERIZATION N/A 08/22/2017    Procedure: Left Heart Cath;  Surgeon: Jatinder Matias MD;  Location: [facility] CATH INVASIVE LOCATION;  Service:     CARDIAC CATHETERIZATION N/A 11/22/2021    Procedure: Left Heart Cath;  Surgeon: Tolu Steinberg MD;  Location: [facility] CATH INVASIVE LOCATION;  Service: Cardiology;  Laterality: N/A;    COLONOSCOPY      ENDOSCOPY      GALLBLADDER SURGERY      PORTACATH PLACEMENT N/A 9/29/2023    Procedure: INSERTION OF PORTACATH;  Surgeon: Didier  Petr Vanegas MD;  Location:  [facility];  Service: General;  Laterality: N/A;    VENTRAL HERNIA REPAIR N/A 2020    Procedure: VENTRAL HERNIA REPAIR LAPAROSCOPIC WITH DAVINCI ROBOT;  Surgeon: Petr Velásquez MD;  Location:  [facility];  Service: DaVinci;  Laterality: N/A;       Social History:  Social History     Socioeconomic History    Marital status: Single    Number of children: 6   Tobacco Use    Smoking status: Former     Current packs/day: 0.00     Average packs/day: 3.0 packs/day for 16.7 years (50.2 ttl pk-yrs)     Types: Cigarettes     Start date: 4/3/1998     Quit date: 2015     Years since quittin.3     Passive exposure: Past    Smokeless tobacco: Never   Vaping Use    Vaping status: Never Used   Substance and Sexual Activity    Alcohol use: No    Drug use: No    Sexual activity: Never         Family History:  Family History   Problem Relation Age of Onset    Heart disease Mother         Rhianna sophia    Heart disease Father     Heart attack Father     Heart failure Father        Review of Systems:  Review of Systems   All other systems reviewed and are negative.      Vital Signs:   /53   Pulse 73   Temp 97.5 °F (36.4 °C) (Temporal)   Resp 18   SpO2 93%      Physical Exam:  Physical Exam  Vitals reviewed.   Constitutional:       General: She is not in acute distress.     Appearance: Normal appearance. She is obese. She is not ill-appearing.      Comments: +hard of hearing   HENT:      Head: Normocephalic and atraumatic.      Mouth/Throat:      Mouth: Mucous membranes are moist.      Pharynx: Oropharynx is clear.   Eyes:      Conjunctiva/sclera: Conjunctivae normal.      Pupils: Pupils are equal, round, and reactive to light.   Cardiovascular:      Rate and Rhythm: Normal rate and regular rhythm.      Heart sounds: No murmur heard.  Pulmonary:      Effort: Pulmonary effort is normal. No respiratory distress.      Breath sounds: Wheezing present.   Chest:      Comments: +R chest  port  Abdominal:      General: Abdomen is flat. Bowel sounds are normal. There is no distension.      Palpations: Abdomen is soft. There is no mass.      Tenderness: There is no abdominal tenderness. There is no guarding.   Musculoskeletal:         General: No swelling. Normal range of motion.      Cervical back: Normal range of motion.      Right lower leg: Edema present.      Left lower leg: Edema present.   Lymphadenopathy:      Cervical: No cervical adenopathy.   Skin:     General: Skin is warm and dry.      Findings: Petechiae present.      Comments: Bilateral Lower Extremities   Neurological:      General: No focal deficit present.      Mental Status: She is alert and oriented to person, place, and time. Mental status is at baseline.   Psychiatric:         Mood and Affect: Mood normal.         PHQ-9 Score  PHQ-9 Total Score:       Pain Score  Vitals:    05/16/24 1313   BP: 127/53   Pulse: 73   Resp: 18   Temp: 97.5 °F (36.4 °C)   TempSrc: Temporal   SpO2: 93%   PainSc: 0-No pain                       PAINSCOREQUALITYMETRIC:   Vitals:    05/16/24 1313   PainSc: 0-No pain                        Lab Review  Lab Results   Component Value Date    WBC 6.69 05/16/2024    HGB 10.9 (L) 05/16/2024    HCT 35.1 05/16/2024    MCV 85.0 05/16/2024    RDW 12.0 (L) 05/16/2024     05/16/2024    NEUTRORELPCT 67.7 05/16/2024    LYMPHORELPCT 17.2 (L) 05/16/2024    MONORELPCT 9.6 05/16/2024    EOSRELPCT 3.9 05/16/2024    BASORELPCT 0.6 05/16/2024    NEUTROABS 4.53 05/16/2024    LYMPHSABS 1.15 05/16/2024     Lab Results   Component Value Date     05/16/2024    K 4.0 05/16/2024    CO2 30.0 (H) 05/16/2024     05/16/2024    BUN 13 05/16/2024    CREATININE 0.95 05/16/2024    EGFRIFNONA 51 (L) 11/22/2021    GLUCOSE 133 (H) 05/16/2024    CALCIUM 9.8 05/16/2024    ALKPHOS 121 (H) 05/16/2024    AST 14 05/16/2024    ALT 9 05/16/2024    BILITOT 0.3 05/16/2024    ALBUMIN 3.9 05/16/2024    PROTEINTOT 7.8 05/16/2024    MG 2.2  01/03/2024    PHOS 3.5 05/18/2020     Lab Results   Component Value Date    RETICCTPCT 4.26 (H) 08/16/2023     Lab Results   Component Value Date    FERRITIN 250.70 (H) 05/16/2024    IRON 31 (L) 05/16/2024    TIBC 297 (L) 05/16/2024    LABIRON 10 (L) 05/16/2024    OPMMUJPX00 548 08/14/2023    FOLATE 12.90 08/14/2023        Radiology Results  CT Chest With Contrast Diagnostic (05/01/2024 09:01)   IMPRESSION:  1. Development of airspace disease in the right upper lobe which is in  the region of a previously noted spiculated nodule likely reflecting  posttreatment related change.  2. Interval improvement in the previously noted bilateral lower lobe  airspace disease with a few patchy opacities remaining.    CT Chest Without Contrast Diagnostic (12/31/2023 20:30)   IMPRESSION:  Spiculated nodule of the right upper lobe concerning for neoplasm.  Masslike consolidation in the right lower lobe could represent pneumonia  with underlying mass not excluded. Atelectasis/airspace disease in the  left lower lobe and lingula. Mediastinal adenopathy. Follow-up  recommended.    CT Chest With Contrast Diagnostic (12/19/2023 14:47)     IMPRESSION:  1.  Significant decrease size and masslike appearance of process in the  right upper lobe which would suggest significant response to treatment.  2.  No suspicious pulmonary nodules or masses identified.  3.  Mediastinal lymph nodes are now within normal limits for size.  No  hilar or mediastinal adenopathy.  4.  Mild centrilobular nodularity of the left lower lobe which may  reflect changes of atypical pneumonia or aspiration pneumonitis.  5.  Stable cardiomegaly.  6.  Other incidental/nonacute findings above.      CT Angiogram Chest Pulmonary Embolism (10/09/2023 16:41)   MPRESSION:  1.  Right upper lobe mass in association with pneumonia has increased in  size and extent when compared to the previous exam with extension to the  pleural surface. Masslike component is approximately 56.8 x  44.5 mm and  was previously 40.1 x 36.7 mm. This would correspond to primary  malignancy.  2.  Central bronchial wall thickening. Can be seen with reactive airway  disease or bronchitis.  3.  1.3 cm left adrenal nodule is noted. No significant change from  previous.  4.  Increased size of paratracheal and mediastinal lymph nodes. A right  paratracheal lymph node is 2.5 cm and was previously 2.2 cm.  5. No PE identified.  6. Marked cardiomegaly, stable.  7. Otherwise stable chest with other nonacute/incidental findings as  above.    CT Head Without Contrast (10/09/2023 12:00)   IMPRESSION:    Unremarkable exam demonstrating no CT evidence of acute intracranial  findings.      CT Chest Without Contrast Diagnostic (08/09/2023 12:38)       NM PET/CT Skull Base to Mid Thigh (06/20/2023 11:12)         CT Chest Low Dose Cancer Screening WO (02/14/2023 12:58)   IMPRESSION:    Interval development or enlargement of a 1.9 cm spiculated right upper  lobe pulmonary nodule concerning in appearance for malignancy and PET/CT  is recommended for further evaluation.        Pathology:     Tissue Pathology Exam (09/06/2023 08:13)         6/29/23           Assessment / Plan         Assessment and Plan   Mayra Hays is a 78 y.o. year old presents for       Non small cell lung cancer   Cancer Staging   Stage IIIA (cT1c, cN2, cM0)  -Oncology history as above. Patient with 1.9 cm spiculated right upper lobe nodule on low dose CT chest screen (2/2023). EBUS by Dr. Peter negative for malignancy (2/2023). PET/CT with hypermetabolism RUL 2.1 cm and pretracheal region lymph node (6/2023). CT guided biopsy negative for malignancy (6/2023). Navigational bronchoscopy with positive RUL and  4R (paratracheal) for squamous cell (9/6/2023). PD-L1 TPS score 60%.   -Patient not deemed for surgical resection. We reviewed her staging and treatment options which include concurrent chemo-radiation followed by adjuvant immunotherapy. Chemotherapy agents  to be used would include weekly carboplatin AUC2 and paclitaxel 50 mg/m2. Chemo will be followed by one year of Durvalumab based on PACIFIC trial.   -Patient experienced anaphylaxis 8 minutes into the paclitaxel on first cycle 10/9. Due to this, paclitaxel will be omitted from weekly chemotherapy.  -Radiation therapy 10/10; 6000 cGy in 30 treatment fractions. Complete 11/21/2023  -Weekly carboplatin with concurrent radiation 10/18-11/21/2023  -Post treatment CT 12/19/23 with good response  -Most recent CT Chest from 05/01/2024 shows treatment related changes.   -Cont maintenance durvalumab every 2 weeks; C9 today    2. Anemia; iron deficiency anemia due to GIB  - Patient with normal H/H in our system 2/2023, with acute drop in Hgb 6/29 (10.7) to Hgb (6.5) on 8/9 requiring transfusion. Patient reports one episode of melena two days ago (she is a poor historian).   -Last colonoscopy and endoscopy are unknown; think it may have been >5 years ago  -CBC from 8/14 show Hgb 8.2, Hct 27, MCV 90. Normal WBC and platelet count. Iron studies with normal iron (37), TIBC (435), and low iron saturation (9). This is in light of recent blood transfusion. Normal folate and B12 level. Reticulocyte count noted to be elevated to 6%  -Patient is with fatigue. Denies shortness of breath (aside from baseline COPD) or chest pain. Continues to be on Eliquis for Afib  -Initial work up from consultation confirmed iron deficiency anemia, normal folate/b12 levels. No indication of hemolysis seen with normal LDH and haptoglobin. Myeloma work up has been negative with no M spike on serum protein electrophoresis and normal k/l free light chain ratio. Peripheral smear with normal total WBC without granulocytic dysplasia or blasts.  -Received Ferumoxytol 8/29/2023 and 11/27/23  -Pt to see surgery post treatment for repeat EGD and Colonoscopy     3. Malignancy associated pain  - Currently denies    4. Surveillance (per NCCN)  -Surveillance would  include CT with and without contrast every 3 to 6 months for 3 years, then every 6 months for 2 years, then low-dose noncontrast CT annually.     5. Petechiae   - Distal lower extremities, non pruritic. Noted on bilateral lower extremities.   - Plt counts 302K today, cont to monitor      Discussed possible differential diagnoses, testing, treatment, recommended non-pharmacological interventions, risks, warning signs to monitor for that would indicate need for follow-up in clinic or ER. If no improvement with these regimens or you have new or worsening symptoms follow-up. Patient verbalizes understanding and agreement with plan of care. Denies further needs or concerns.     Patient was given instructions and counseling regarding her condition and for health maintenance advised.       All questions were answered to her satisfaction.              Meds ordered during this visit  No orders of the defined types were placed in this encounter.      Visit Diagnoses    ICD-10-CM ICD-9-CM   1. Non-small cell cancer of right lung  C34.91 162.9   2. Iron deficiency anemia, unspecified iron deficiency anemia type  D50.9 280.9   3. Malabsorption due to intolerance, not elsewhere classified  K90.49 579.8                       Follow Up   With MD in one month with CBC/CMP.          This document has been electronically signed by ERASTO Lyons   May 16, 2024 14:37 EDT    Dictated Utilizing Dragon Dictation: Part of this note may be an electronic transcription/translation of spoken language to printed text using the Dragon Dictation System.

## 2024-05-30 ENCOUNTER — LAB (OUTPATIENT)
Dept: ONCOLOGY | Facility: HOSPITAL | Age: 79
End: 2024-05-30
Payer: MEDICARE

## 2024-05-30 ENCOUNTER — INFUSION (OUTPATIENT)
Dept: ONCOLOGY | Facility: HOSPITAL | Age: 79
End: 2024-05-30
Payer: MEDICARE

## 2024-05-30 VITALS
WEIGHT: 211.2 LBS | SYSTOLIC BLOOD PRESSURE: 123 MMHG | TEMPERATURE: 97.8 F | DIASTOLIC BLOOD PRESSURE: 53 MMHG | OXYGEN SATURATION: 92 % | RESPIRATION RATE: 18 BRPM | BODY MASS INDEX: 34.09 KG/M2 | HEART RATE: 67 BPM

## 2024-05-30 DIAGNOSIS — Z95.828 PORT-A-CATH IN PLACE: ICD-10-CM

## 2024-05-30 DIAGNOSIS — C34.11 PRIMARY CANCER OF RIGHT UPPER LOBE OF LUNG: ICD-10-CM

## 2024-05-30 DIAGNOSIS — C34.11 PRIMARY CANCER OF RIGHT UPPER LOBE OF LUNG: Primary | ICD-10-CM

## 2024-05-30 DIAGNOSIS — C34.91 NON-SMALL CELL CANCER OF RIGHT LUNG: ICD-10-CM

## 2024-05-30 LAB
ALBUMIN SERPL-MCNC: 3.7 G/DL (ref 3.5–5.2)
ALBUMIN/GLOB SERPL: 1.1 G/DL
ALP SERPL-CCNC: 125 U/L (ref 39–117)
ALT SERPL W P-5'-P-CCNC: 12 U/L (ref 1–33)
ANION GAP SERPL CALCULATED.3IONS-SCNC: 11.3 MMOL/L (ref 5–15)
AST SERPL-CCNC: 12 U/L (ref 1–32)
BASOPHILS # BLD AUTO: 0.05 10*3/MM3 (ref 0–0.2)
BASOPHILS NFR BLD AUTO: 1.1 % (ref 0–1.5)
BILIRUB SERPL-MCNC: 0.2 MG/DL (ref 0–1.2)
BUN SERPL-MCNC: 17 MG/DL (ref 8–23)
BUN/CREAT SERPL: 16.3 (ref 7–25)
CALCIUM SPEC-SCNC: 9 MG/DL (ref 8.6–10.5)
CHLORIDE SERPL-SCNC: 104 MMOL/L (ref 98–107)
CO2 SERPL-SCNC: 26.7 MMOL/L (ref 22–29)
CREAT SERPL-MCNC: 1.04 MG/DL (ref 0.57–1)
DEPRECATED RDW RBC AUTO: 39.8 FL (ref 37–54)
EGFRCR SERPLBLD CKD-EPI 2021: 55.5 ML/MIN/1.73
EOSINOPHIL # BLD AUTO: 0.18 10*3/MM3 (ref 0–0.4)
EOSINOPHIL NFR BLD AUTO: 4 % (ref 0.3–6.2)
ERYTHROCYTE [DISTWIDTH] IN BLOOD BY AUTOMATED COUNT: 12.8 % (ref 12.3–15.4)
GLOBULIN UR ELPH-MCNC: 3.3 GM/DL
GLUCOSE SERPL-MCNC: 183 MG/DL (ref 65–99)
HCT VFR BLD AUTO: 33.8 % (ref 34–46.6)
HGB BLD-MCNC: 10.5 G/DL (ref 12–15.9)
IMM GRANULOCYTES # BLD AUTO: 0.02 10*3/MM3 (ref 0–0.05)
IMM GRANULOCYTES NFR BLD AUTO: 0.4 % (ref 0–0.5)
LYMPHOCYTES # BLD AUTO: 0.72 10*3/MM3 (ref 0.7–3.1)
LYMPHOCYTES NFR BLD AUTO: 16 % (ref 19.6–45.3)
MCH RBC QN AUTO: 26.6 PG (ref 26.6–33)
MCHC RBC AUTO-ENTMCNC: 31.1 G/DL (ref 31.5–35.7)
MCV RBC AUTO: 85.8 FL (ref 79–97)
MONOCYTES # BLD AUTO: 0.58 10*3/MM3 (ref 0.1–0.9)
MONOCYTES NFR BLD AUTO: 12.9 % (ref 5–12)
NEUTROPHILS NFR BLD AUTO: 2.94 10*3/MM3 (ref 1.7–7)
NEUTROPHILS NFR BLD AUTO: 65.6 % (ref 42.7–76)
NRBC BLD AUTO-RTO: 0 /100 WBC (ref 0–0.2)
PLATELET # BLD AUTO: 221 10*3/MM3 (ref 140–450)
PMV BLD AUTO: 10.3 FL (ref 6–12)
POTASSIUM SERPL-SCNC: 3.9 MMOL/L (ref 3.5–5.2)
PROT SERPL-MCNC: 7 G/DL (ref 6–8.5)
RBC # BLD AUTO: 3.94 10*6/MM3 (ref 3.77–5.28)
SODIUM SERPL-SCNC: 142 MMOL/L (ref 136–145)
T4 FREE SERPL-MCNC: 0.99 NG/DL (ref 0.93–1.7)
TSH SERPL DL<=0.05 MIU/L-ACNC: 0.7 UIU/ML (ref 0.27–4.2)
WBC NRBC COR # BLD AUTO: 4.49 10*3/MM3 (ref 3.4–10.8)

## 2024-05-30 PROCEDURE — 84439 ASSAY OF FREE THYROXINE: CPT

## 2024-05-30 PROCEDURE — 80053 COMPREHEN METABOLIC PANEL: CPT

## 2024-05-30 PROCEDURE — 84443 ASSAY THYROID STIM HORMONE: CPT

## 2024-05-30 PROCEDURE — 25010000002 DURVALUMAB 500 MG/10ML SOLUTION 10 ML VIAL: Performed by: INTERNAL MEDICINE

## 2024-05-30 PROCEDURE — 96413 CHEMO IV INFUSION 1 HR: CPT

## 2024-05-30 PROCEDURE — 25810000003 SODIUM CHLORIDE 0.9 % SOLUTION: Performed by: INTERNAL MEDICINE

## 2024-05-30 PROCEDURE — 85025 COMPLETE CBC W/AUTO DIFF WBC: CPT

## 2024-05-30 PROCEDURE — 25810000003 SODIUM CHLORIDE 0.9 % SOLUTION 250 ML FLEX CONT: Performed by: INTERNAL MEDICINE

## 2024-05-30 PROCEDURE — 25010000002 HEPARIN LOCK FLUSH PER 10 UNITS: Performed by: NURSE PRACTITIONER

## 2024-05-30 RX ORDER — HEPARIN SODIUM (PORCINE) LOCK FLUSH IV SOLN 100 UNIT/ML 100 UNIT/ML
500 SOLUTION INTRAVENOUS AS NEEDED
OUTPATIENT
Start: 2024-05-30

## 2024-05-30 RX ORDER — SODIUM CHLORIDE 0.9 % (FLUSH) 0.9 %
10 SYRINGE (ML) INJECTION AS NEEDED
Status: DISCONTINUED | OUTPATIENT
Start: 2024-05-30 | End: 2024-05-30 | Stop reason: HOSPADM

## 2024-05-30 RX ORDER — SODIUM CHLORIDE 0.9 % (FLUSH) 0.9 %
10 SYRINGE (ML) INJECTION AS NEEDED
OUTPATIENT
Start: 2024-05-30

## 2024-05-30 RX ORDER — SODIUM CHLORIDE 0.9 % (FLUSH) 0.9 %
20 SYRINGE (ML) INJECTION AS NEEDED
OUTPATIENT
Start: 2024-05-30

## 2024-05-30 RX ORDER — HEPARIN SODIUM (PORCINE) LOCK FLUSH IV SOLN 100 UNIT/ML 100 UNIT/ML
300 SOLUTION INTRAVENOUS ONCE
OUTPATIENT
Start: 2024-05-30

## 2024-05-30 RX ORDER — HEPARIN SODIUM (PORCINE) LOCK FLUSH IV SOLN 100 UNIT/ML 100 UNIT/ML
500 SOLUTION INTRAVENOUS AS NEEDED
Status: DISCONTINUED | OUTPATIENT
Start: 2024-05-30 | End: 2024-05-30 | Stop reason: HOSPADM

## 2024-05-30 RX ORDER — SODIUM CHLORIDE 9 MG/ML
250 INJECTION, SOLUTION INTRAVENOUS ONCE
Status: COMPLETED | OUTPATIENT
Start: 2024-05-30 | End: 2024-05-30

## 2024-05-30 RX ADMIN — Medication 10 ML: at 17:04

## 2024-05-30 RX ADMIN — HEPARIN 500 UNITS: 100 SYRINGE at 17:04

## 2024-05-30 RX ADMIN — SODIUM CHLORIDE 950 MG: 9 INJECTION, SOLUTION INTRAVENOUS at 15:52

## 2024-05-30 RX ADMIN — SODIUM CHLORIDE 250 ML: 9 INJECTION, SOLUTION INTRAVENOUS at 15:52

## 2024-05-31 ENCOUNTER — APPOINTMENT (OUTPATIENT)
Dept: GENERAL RADIOLOGY | Facility: HOSPITAL | Age: 79
End: 2024-05-31
Payer: MEDICARE

## 2024-05-31 ENCOUNTER — HOSPITAL ENCOUNTER (EMERGENCY)
Facility: HOSPITAL | Age: 79
Discharge: HOME | End: 2024-06-01
Attending: EMERGENCY MEDICINE
Payer: MEDICARE

## 2024-05-31 DIAGNOSIS — J18.9 PNEUMONIA OF RIGHT UPPER LOBE DUE TO INFECTIOUS ORGANISM: Primary | ICD-10-CM

## 2024-05-31 LAB
A-A DO2: 65.2 MMHG (ref 0–300)
ALBUMIN SERPL-MCNC: 3.9 G/DL (ref 3.5–5.2)
ALBUMIN/GLOB SERPL: 1.1 G/DL
ALP SERPL-CCNC: 124 U/L (ref 39–117)
ALT SERPL W P-5'-P-CCNC: 12 U/L (ref 1–33)
ANION GAP SERPL CALCULATED.3IONS-SCNC: 12.5 MMOL/L (ref 5–15)
ARTERIAL PATENCY WRIST A: POSITIVE
AST SERPL-CCNC: 14 U/L (ref 1–32)
ATMOSPHERIC PRESS: 730 MMHG
BASE EXCESS BLDA CALC-SCNC: 0.9 MMOL/L (ref 0–2)
BASOPHILS # BLD AUTO: 0.04 10*3/MM3 (ref 0–0.2)
BASOPHILS NFR BLD AUTO: 0.7 % (ref 0–1.5)
BDY SITE: ABNORMAL
BILIRUB SERPL-MCNC: 0.3 MG/DL (ref 0–1.2)
BILIRUB UR QL STRIP: NEGATIVE
BUN SERPL-MCNC: 14 MG/DL (ref 8–23)
BUN/CREAT SERPL: 12.8 (ref 7–25)
CALCIUM SPEC-SCNC: 9.2 MG/DL (ref 8.6–10.5)
CHLORIDE SERPL-SCNC: 99 MMOL/L (ref 98–107)
CLARITY UR: CLEAR
CO2 BLDA-SCNC: 28.2 MMOL/L (ref 22–33)
CO2 SERPL-SCNC: 23.5 MMOL/L (ref 22–29)
COHGB MFR BLD: 1.4 % (ref 0–5)
COLOR UR: YELLOW
CREAT SERPL-MCNC: 1.09 MG/DL (ref 0.57–1)
D-LACTATE SERPL-SCNC: 2.1 MMOL/L (ref 0.5–2)
D-LACTATE SERPL-SCNC: 2.6 MMOL/L (ref 0.5–2)
DEPRECATED RDW RBC AUTO: 40.7 FL (ref 37–54)
EGFRCR SERPLBLD CKD-EPI 2021: 52.4 ML/MIN/1.73
EOSINOPHIL # BLD AUTO: 0.05 10*3/MM3 (ref 0–0.4)
EOSINOPHIL NFR BLD AUTO: 0.9 % (ref 0.3–6.2)
ERYTHROCYTE [DISTWIDTH] IN BLOOD BY AUTOMATED COUNT: 13 % (ref 12.3–15.4)
FLUAV RNA RESP QL NAA+PROBE: NOT DETECTED
FLUBV RNA RESP QL NAA+PROBE: NOT DETECTED
GAS FLOW AIRWAY: 2 LPM
GEN 5 2HR TROPONIN T REFLEX: 16 NG/L
GLOBULIN UR ELPH-MCNC: 3.4 GM/DL
GLUCOSE SERPL-MCNC: 280 MG/DL (ref 65–99)
GLUCOSE UR STRIP-MCNC: ABNORMAL MG/DL
HCO3 BLDA-SCNC: 26.8 MMOL/L (ref 20–26)
HCT VFR BLD AUTO: 35.5 % (ref 34–46.6)
HCT VFR BLD CALC: 33.7 % (ref 38–51)
HGB BLD-MCNC: 10.9 G/DL (ref 12–15.9)
HGB BLDA-MCNC: 11 G/DL (ref 13.5–17.5)
HGB UR QL STRIP.AUTO: NEGATIVE
HOLD SPECIMEN: NORMAL
HOLD SPECIMEN: NORMAL
IMM GRANULOCYTES # BLD AUTO: 0.02 10*3/MM3 (ref 0–0.05)
IMM GRANULOCYTES NFR BLD AUTO: 0.4 % (ref 0–0.5)
INHALED O2 CONCENTRATION: 28 %
KETONES UR QL STRIP: NEGATIVE
LEUKOCYTE ESTERASE UR QL STRIP.AUTO: NEGATIVE
LYMPHOCYTES # BLD AUTO: 0.51 10*3/MM3 (ref 0.7–3.1)
LYMPHOCYTES NFR BLD AUTO: 9.6 % (ref 19.6–45.3)
Lab: ABNORMAL
MCH RBC QN AUTO: 26.6 PG (ref 26.6–33)
MCHC RBC AUTO-ENTMCNC: 30.7 G/DL (ref 31.5–35.7)
MCV RBC AUTO: 86.6 FL (ref 79–97)
METHGB BLD QL: 0.5 % (ref 0–3)
MODALITY: ABNORMAL
MONOCYTES # BLD AUTO: 0.62 10*3/MM3 (ref 0.1–0.9)
MONOCYTES NFR BLD AUTO: 11.6 % (ref 5–12)
NEUTROPHILS NFR BLD AUTO: 4.1 10*3/MM3 (ref 1.7–7)
NEUTROPHILS NFR BLD AUTO: 76.8 % (ref 42.7–76)
NITRITE UR QL STRIP: NEGATIVE
NRBC BLD AUTO-RTO: 0 /100 WBC (ref 0–0.2)
NT-PROBNP SERPL-MCNC: 501.4 PG/ML (ref 0–1800)
OXYHGB MFR BLDV: 92.9 % (ref 94–99)
PCO2 BLDA: 47.3 MM HG (ref 35–45)
PCO2 TEMP ADJ BLD: ABNORMAL MM[HG]
PH BLDA: 7.36 PH UNITS (ref 7.35–7.45)
PH UR STRIP.AUTO: 6.5 [PH] (ref 5–8)
PH, TEMP CORRECTED: ABNORMAL
PLATELET # BLD AUTO: 217 10*3/MM3 (ref 140–450)
PMV BLD AUTO: 10.7 FL (ref 6–12)
PO2 BLDA: 73.2 MM HG (ref 83–108)
PO2 TEMP ADJ BLD: ABNORMAL MM[HG]
POTASSIUM SERPL-SCNC: 3.9 MMOL/L (ref 3.5–5.2)
PROT SERPL-MCNC: 7.3 G/DL (ref 6–8.5)
PROT UR QL STRIP: NEGATIVE
RBC # BLD AUTO: 4.1 10*6/MM3 (ref 3.77–5.28)
SAO2 % BLDCOA: 94.7 % (ref 94–99)
SARS-COV-2 RNA RESP QL NAA+PROBE: NOT DETECTED
SODIUM SERPL-SCNC: 135 MMOL/L (ref 136–145)
SP GR UR STRIP: 1.01 (ref 1–1.03)
TROPONIN T DELTA: -2 NG/L
TROPONIN T SERPL HS-MCNC: 18 NG/L
UROBILINOGEN UR QL STRIP: ABNORMAL
VENTILATOR MODE: ABNORMAL
WBC NRBC COR # BLD AUTO: 5.34 10*3/MM3 (ref 3.4–10.8)
WHOLE BLOOD HOLD COAG: NORMAL
WHOLE BLOOD HOLD SPECIMEN: NORMAL

## 2024-05-31 PROCEDURE — 84484 ASSAY OF TROPONIN QUANT: CPT | Performed by: EMERGENCY MEDICINE

## 2024-05-31 PROCEDURE — 94799 UNLISTED PULMONARY SVC/PX: CPT

## 2024-05-31 PROCEDURE — 25010000002 METHYLPREDNISOLONE PER 125 MG: Performed by: NURSE PRACTITIONER

## 2024-05-31 PROCEDURE — 87636 SARSCOV2 & INF A&B AMP PRB: CPT | Performed by: EMERGENCY MEDICINE

## 2024-05-31 PROCEDURE — 93005 ELECTROCARDIOGRAM TRACING: CPT | Performed by: EMERGENCY MEDICINE

## 2024-05-31 PROCEDURE — 25810000003 SODIUM CHLORIDE 0.9 % SOLUTION: Performed by: NURSE PRACTITIONER

## 2024-05-31 PROCEDURE — 82805 BLOOD GASES W/O2 SATURATION: CPT

## 2024-05-31 PROCEDURE — 83880 ASSAY OF NATRIURETIC PEPTIDE: CPT | Performed by: EMERGENCY MEDICINE

## 2024-05-31 PROCEDURE — 81003 URINALYSIS AUTO W/O SCOPE: CPT | Performed by: NURSE PRACTITIONER

## 2024-05-31 PROCEDURE — 83605 ASSAY OF LACTIC ACID: CPT | Performed by: EMERGENCY MEDICINE

## 2024-05-31 PROCEDURE — 96374 THER/PROPH/DIAG INJ IV PUSH: CPT

## 2024-05-31 PROCEDURE — 87040 BLOOD CULTURE FOR BACTERIA: CPT | Performed by: EMERGENCY MEDICINE

## 2024-05-31 PROCEDURE — 71045 X-RAY EXAM CHEST 1 VIEW: CPT | Performed by: RADIOLOGY

## 2024-05-31 PROCEDURE — 99284 EMERGENCY DEPT VISIT MOD MDM: CPT

## 2024-05-31 PROCEDURE — 80053 COMPREHEN METABOLIC PANEL: CPT | Performed by: EMERGENCY MEDICINE

## 2024-05-31 PROCEDURE — 71045 X-RAY EXAM CHEST 1 VIEW: CPT

## 2024-05-31 PROCEDURE — 83050 HGB METHEMOGLOBIN QUAN: CPT

## 2024-05-31 PROCEDURE — 82375 ASSAY CARBOXYHB QUANT: CPT

## 2024-05-31 PROCEDURE — 93010 ELECTROCARDIOGRAM REPORT: CPT | Performed by: INTERNAL MEDICINE

## 2024-05-31 PROCEDURE — 85025 COMPLETE CBC W/AUTO DIFF WBC: CPT | Performed by: EMERGENCY MEDICINE

## 2024-05-31 PROCEDURE — 94640 AIRWAY INHALATION TREATMENT: CPT

## 2024-05-31 PROCEDURE — 36415 COLL VENOUS BLD VENIPUNCTURE: CPT

## 2024-05-31 PROCEDURE — 36600 WITHDRAWAL OF ARTERIAL BLOOD: CPT

## 2024-05-31 RX ORDER — SODIUM CHLORIDE 0.9 % (FLUSH) 0.9 %
10 SYRINGE (ML) INJECTION AS NEEDED
Status: DISCONTINUED | OUTPATIENT
Start: 2024-05-31 | End: 2024-06-01 | Stop reason: HOSPADM

## 2024-05-31 RX ORDER — DOXYCYCLINE 100 MG/1
100 CAPSULE ORAL 2 TIMES DAILY
Qty: 14 CAPSULE | Refills: 0 | OUTPATIENT
Start: 2024-05-31 | End: 2024-06-06

## 2024-05-31 RX ORDER — IPRATROPIUM BROMIDE AND ALBUTEROL SULFATE 2.5; .5 MG/3ML; MG/3ML
3 SOLUTION RESPIRATORY (INHALATION) ONCE
Status: COMPLETED | OUTPATIENT
Start: 2024-05-31 | End: 2024-05-31

## 2024-05-31 RX ORDER — METHYLPREDNISOLONE SODIUM SUCCINATE 125 MG/2ML
125 INJECTION, POWDER, LYOPHILIZED, FOR SOLUTION INTRAMUSCULAR; INTRAVENOUS ONCE
Status: COMPLETED | OUTPATIENT
Start: 2024-05-31 | End: 2024-05-31

## 2024-05-31 RX ADMIN — SODIUM CHLORIDE 500 ML: 9 INJECTION, SOLUTION INTRAVENOUS at 21:35

## 2024-05-31 RX ADMIN — METHYLPREDNISOLONE SODIUM SUCCINATE 125 MG: 125 INJECTION, POWDER, FOR SOLUTION INTRAMUSCULAR; INTRAVENOUS at 21:10

## 2024-05-31 RX ADMIN — IPRATROPIUM BROMIDE AND ALBUTEROL SULFATE 3 ML: 2.5; .5 SOLUTION RESPIRATORY (INHALATION) at 20:42

## 2024-06-01 VITALS
WEIGHT: 204 LBS | DIASTOLIC BLOOD PRESSURE: 72 MMHG | SYSTOLIC BLOOD PRESSURE: 143 MMHG | BODY MASS INDEX: 32.78 KG/M2 | OXYGEN SATURATION: 90 % | RESPIRATION RATE: 24 BRPM | HEIGHT: 66 IN | HEART RATE: 86 BPM | TEMPERATURE: 98.9 F

## 2024-06-01 LAB
QT INTERVAL: 376 MS
QTC INTERVAL: 494 MS

## 2024-06-01 RX ORDER — DOXYCYCLINE 100 MG/1
100 CAPSULE ORAL ONCE
Status: COMPLETED | OUTPATIENT
Start: 2024-06-01 | End: 2024-06-01

## 2024-06-01 RX ADMIN — DOXYCYCLINE 100 MG: 100 CAPSULE ORAL at 00:20

## 2024-06-03 NOTE — ED PROVIDER NOTES
Subjective   History of Present Illness  Patient is a 78-year-old female with past medical history significant for CHF, CKD, COPD, diabetes mellitus, hyperlipidemia, GERD, hypertension, and history of stroke.  She presents the ED today with complaints of shortness of breath and cough.  Reports worsening shortness of breath over the last few days.  She states that she is supposed to be wearing 2 L of oxygen at home but has not been wearing it.  She denies any known fevers.  She denies any chest pain, nausea, vomiting, diarrhea, abdominal pain, dizziness, syncope, focal weakness or any other significant complaints.        Review of Systems   Constitutional:  Positive for fatigue. Negative for fever.   HENT: Negative.     Eyes: Negative.    Respiratory:  Positive for cough and shortness of breath.    Cardiovascular: Negative.  Negative for chest pain.   Gastrointestinal: Negative.  Negative for abdominal pain.   Endocrine: Negative.    Genitourinary: Negative.  Negative for dysuria.   Musculoskeletal: Negative.    Skin: Negative.    Allergic/Immunologic: Negative.    Neurological: Negative.    Hematological: Negative.    Psychiatric/Behavioral: Negative.     All other systems reviewed and are negative.      Past Medical History:   Diagnosis Date    Arthritis     CHF (congestive heart failure)     Chronic anticoagulation     Eliquis    Chronic kidney disease     stage II/IIIa    Collapsed lung     COPD (chronic obstructive pulmonary disease)     Diabetes mellitus     TYPE 2    Elevated cholesterol     GERD (gastroesophageal reflux disease)     Hyperlipidemia     Hypertension     Non-small cell lung cancer RUL     Sleep apnea     non compliant with CPAP    Stroke 2019    Wears eyeglasses        Allergies   Allergen Reactions    Paclitaxel Anaphylaxis       Past Surgical History:   Procedure Laterality Date    BRONCHOSCOPY Bilateral 02/27/2023    Procedure: BRONCHOSCOPY WITH ENDOBRONCHIAL ULTRASOUND;  Surgeon: Rome  MD Abdulkadir;  [facility]:  [facility];  Service: Pulmonary;  Laterality: Bilateral;    BRONCHOSCOPY WITH ION ROBOTIC ASSIST N/A 2023    Procedure: BRONCHOSCOPY WITH ION ROBOT AND EBUS;  Surgeon: John Clemens MD;  Location:  [facility];  Service: Robotics - Pulmonary;  Laterality: N/A;  Ion cath #6 - 0032,  #6 - 0030, cath guide # 0077. EBUS scope removed with balloon intact.    CARDIAC CATHETERIZATION N/A 2017    Procedure: Left Heart Cath;  Surgeon: Jatinder Matias MD;  [facility];  Service:     CARDIAC CATHETERIZATION N/A 2021    Procedure: Left Heart Cath;  Surgeon: Tolu Steinberg MD;  [facility];  Service: Cardiology;  Laterality: N/A;    COLONOSCOPY      ENDOSCOPY      GALLBLADDER SURGERY      PORTACATH PLACEMENT N/A 2023    Procedure: INSERTION OF PORTACATH;  Surgeon: Petr Velásquez MD;  Location:  [facility];  Service: General;  Laterality: N/A;    VENTRAL HERNIA REPAIR N/A 2020    Procedure: VENTRAL HERNIA REPAIR LAPAROSCOPIC WITH DAVINCI ROBOT;  Surgeon: Petr Velásquez MD;  [facility];  Service: DaVinci;  Laterality: N/A;       Family History   Problem Relation Age of Onset    Heart disease Mother         Rhianna sophia    Heart disease Father     Heart attack Father     Heart failure Father        Social History     Socioeconomic History    Marital status: Single    Number of children: 6   Tobacco Use    Smoking status: Former     Current packs/day: 0.00     Average packs/day: 3.0 packs/day for 16.7 years (50.2 ttl pk-yrs)     Types: Cigarettes     Start date: 4/3/1998     Quit date: 2015     Years since quittin.4     Passive exposure: Past    Smokeless tobacco: Never   Vaping Use    Vaping status: Never Used   Substance and Sexual Activity    Alcohol use: No    Drug use: No    Sexual activity: Never           Objective   Physical Exam  Vitals and nursing note reviewed.    Constitutional:       General: She is not in acute distress.     Appearance: She is well-developed. She is not diaphoretic.   HENT:      Head: Normocephalic and atraumatic.      Right Ear: External ear normal.      Left Ear: External ear normal.      Nose: Nose normal.   Eyes:      Conjunctiva/sclera: Conjunctivae normal.      Pupils: Pupils are equal, round, and reactive to light.   Neck:      Vascular: No JVD.      Trachea: No tracheal deviation.   Cardiovascular:      Rate and Rhythm: Normal rate and regular rhythm.      Heart sounds: Normal heart sounds. No murmur heard.  Pulmonary:      Effort: Pulmonary effort is normal. No respiratory distress.      Breath sounds: Decreased breath sounds present. No wheezing.   Abdominal:      General: Bowel sounds are normal.      Palpations: Abdomen is soft.      Tenderness: There is no abdominal tenderness.   Musculoskeletal:         General: No deformity. Normal range of motion.      Cervical back: Normal range of motion and neck supple.   Skin:     General: Skin is warm and dry.      Capillary Refill: Capillary refill takes less than 2 seconds.      Coloration: Skin is not pale.      Findings: No erythema or rash.   Neurological:      General: No focal deficit present.      Mental Status: She is alert and oriented to person, place, and time.      Cranial Nerves: No cranial nerve deficit.   Psychiatric:         Mood and Affect: Mood normal.         Behavior: Behavior normal.         Thought Content: Thought content normal.         Procedures           ED Course                                             Medical Decision Making  Patient is a 78-year-old female with past medical history significant for CHF, CKD, COPD, diabetes mellitus, hyperlipidemia, GERD, hypertension, and history of stroke.  She presents the ED today with complaints of shortness of breath and cough.  Reports worsening shortness of breath over the last few days.  She states that she is supposed to be  wearing 2 L of oxygen at home but has not been wearing it.  She denies any known fevers.  She denies any chest pain, nausea, vomiting, diarrhea, abdominal pain, dizziness, syncope, focal weakness or any other significant complaints.    Problems Addressed:  Pneumonia of right upper lobe due to infectious organism: complicated acute illness or injury     Details: Discharged home on doxycycline.  Advised to return to the ED with any worsening of symptoms.    Amount and/or Complexity of Data Reviewed  Labs: ordered.  Radiology: ordered.  ECG/medicine tests: ordered.    Risk  Prescription drug management.        Final diagnoses:   Pneumonia of right upper lobe due to infectious organism       ED Disposition  ED Disposition       ED Disposition   Discharge    Condition   Stable    Comment   --               Niki Antony, APRN  [address] [id number] [phone]    Call in 2 days           Medication List        New Prescriptions      doxycycline 100 MG capsule  Commonly known as: MONODOX  Take 1 capsule by mouth 2 (Two) Times a Day for 7 days.               Where to Get Your Medications        These medications were sent to [place], [place] - [address] [facility] [address]  - [phone] [facility] [phone]   [address] [facility] [address] [facility] [address]      Phone: [phone]   doxycycline 100 MG capsule            Elizabet Motta APRN  06/03/24 5712

## 2024-06-04 ENCOUNTER — TELEPHONE (OUTPATIENT)
Dept: ONCOLOGY | Facility: CLINIC | Age: 79
End: 2024-06-04
Payer: MEDICARE

## 2024-06-04 NOTE — TELEPHONE ENCOUNTER
RN called to check on patient after recent ER visit. RN spoke with patient's POA, Hailey, who stated the patient was diagnosed with pneumonia and was sent home on antibiotics. When asked how the patient was feeling, she said she is feeling better but still has trouble breathing at times. When asked if the patient has a pulse oximeter to monitor oxygen saturation levels, she denied and stated patient has been wearing oxygen continuously. RN voiced understanding and encouraged Hailey to call us by the end of the week to let us know how patient is doing. If the patient is still not feeling well, we may have Dr. Bond see her sooner than 6/11 or have a CT chest performed in the meantime. She verbalized understanding.     RN, then, transferred phone call to Angela up front to schedule patient to see Dr. Bond on 6/11.

## 2024-06-05 DIAGNOSIS — C34.11 PRIMARY CANCER OF RIGHT UPPER LOBE OF LUNG: ICD-10-CM

## 2024-06-05 DIAGNOSIS — C34.91 NON-SMALL CELL CANCER OF RIGHT LUNG: Primary | ICD-10-CM

## 2024-06-05 LAB
BACTERIA SPEC AEROBE CULT: NORMAL
BACTERIA SPEC AEROBE CULT: NORMAL

## 2024-06-05 RX ORDER — SODIUM CHLORIDE 9 MG/ML
250 INJECTION, SOLUTION INTRAVENOUS ONCE
OUTPATIENT
Start: 2024-06-13

## 2024-06-06 ENCOUNTER — HOSPITAL ENCOUNTER (EMERGENCY)
Facility: HOSPITAL | Age: 79
Discharge: HOME | End: 2024-06-06
Attending: STUDENT IN AN ORGANIZED HEALTH CARE EDUCATION/TRAINING PROGRAM | Admitting: STUDENT IN AN ORGANIZED HEALTH CARE EDUCATION/TRAINING PROGRAM
Payer: MEDICARE

## 2024-06-06 ENCOUNTER — APPOINTMENT (OUTPATIENT)
Dept: GENERAL RADIOLOGY | Facility: HOSPITAL | Age: 79
End: 2024-06-06
Payer: MEDICARE

## 2024-06-06 VITALS
HEART RATE: 74 BPM | OXYGEN SATURATION: 96 % | RESPIRATION RATE: 21 BRPM | TEMPERATURE: 97.5 F | SYSTOLIC BLOOD PRESSURE: 125 MMHG | BODY MASS INDEX: 32.6 KG/M2 | WEIGHT: 202.82 LBS | DIASTOLIC BLOOD PRESSURE: 61 MMHG | HEIGHT: 66 IN

## 2024-06-06 DIAGNOSIS — J18.9 PNEUMONIA DUE TO INFECTIOUS ORGANISM, UNSPECIFIED LATERALITY, UNSPECIFIED PART OF LUNG: Primary | ICD-10-CM

## 2024-06-06 DIAGNOSIS — A41.9 SEPSIS DUE TO PNEUMONIA: ICD-10-CM

## 2024-06-06 DIAGNOSIS — J18.9 SEPSIS DUE TO PNEUMONIA: ICD-10-CM

## 2024-06-06 DIAGNOSIS — C34.91 NON-SMALL CELL CANCER OF RIGHT LUNG: ICD-10-CM

## 2024-06-06 DIAGNOSIS — E78.5 HYPERLIPIDEMIA LDL GOAL <100: ICD-10-CM

## 2024-06-06 DIAGNOSIS — J44.1 COPD EXACERBATION: ICD-10-CM

## 2024-06-06 LAB
A-A DO2: 51.9 MMHG (ref 0–300)
ALBUMIN SERPL-MCNC: 3.9 G/DL (ref 3.5–5.2)
ALBUMIN/GLOB SERPL: 1.1 G/DL
ALP SERPL-CCNC: 111 U/L (ref 39–117)
ALT SERPL W P-5'-P-CCNC: 7 U/L (ref 1–33)
ANION GAP SERPL CALCULATED.3IONS-SCNC: 9.7 MMOL/L (ref 5–15)
ARTERIAL PATENCY WRIST A: POSITIVE
AST SERPL-CCNC: 10 U/L (ref 1–32)
ATMOSPHERIC PRESS: 720 MMHG
BASE EXCESS BLDA CALC-SCNC: 1.7 MMOL/L (ref 0–2)
BASOPHILS # BLD AUTO: 0.04 10*3/MM3 (ref 0–0.2)
BASOPHILS NFR BLD AUTO: 0.4 % (ref 0–1.5)
BDY SITE: ABNORMAL
BILIRUB SERPL-MCNC: 0.4 MG/DL (ref 0–1.2)
BUN SERPL-MCNC: 25 MG/DL (ref 8–23)
BUN/CREAT SERPL: 21.6 (ref 7–25)
CALCIUM SPEC-SCNC: 9.8 MG/DL (ref 8.6–10.5)
CHLORIDE SERPL-SCNC: 100 MMOL/L (ref 98–107)
CO2 BLDA-SCNC: 28 MMOL/L (ref 22–33)
CO2 SERPL-SCNC: 28.3 MMOL/L (ref 22–29)
COHGB MFR BLD: 1.8 % (ref 0–5)
CREAT SERPL-MCNC: 1.16 MG/DL (ref 0.57–1)
CRP SERPL-MCNC: 14.43 MG/DL (ref 0–0.5)
D-LACTATE SERPL-SCNC: 1 MMOL/L (ref 0.5–2)
DEPRECATED RDW RBC AUTO: 40.7 FL (ref 37–54)
EGFRCR SERPLBLD CKD-EPI 2021: 48.7 ML/MIN/1.73
EOSINOPHIL # BLD AUTO: 0.25 10*3/MM3 (ref 0–0.4)
EOSINOPHIL NFR BLD AUTO: 2.6 % (ref 0.3–6.2)
ERYTHROCYTE [DISTWIDTH] IN BLOOD BY AUTOMATED COUNT: 13.1 % (ref 12.3–15.4)
FLUAV RNA RESP QL NAA+PROBE: NOT DETECTED
FLUBV RNA RESP QL NAA+PROBE: NOT DETECTED
GLOBULIN UR ELPH-MCNC: 3.7 GM/DL
GLUCOSE SERPL-MCNC: 110 MG/DL (ref 65–99)
HCO3 BLDA-SCNC: 26.7 MMOL/L (ref 20–26)
HCT VFR BLD AUTO: 37.7 % (ref 34–46.6)
HCT VFR BLD CALC: 36.5 % (ref 38–51)
HGB BLD-MCNC: 11.4 G/DL (ref 12–15.9)
HGB BLDA-MCNC: 11.9 G/DL (ref 13.5–17.5)
HOLD SPECIMEN: NORMAL
HOLD SPECIMEN: NORMAL
IMM GRANULOCYTES # BLD AUTO: 0.11 10*3/MM3 (ref 0–0.05)
IMM GRANULOCYTES NFR BLD AUTO: 1.2 % (ref 0–0.5)
INHALED O2 CONCENTRATION: 21 %
INR PPP: 1.15 (ref 0.9–1.1)
LYMPHOCYTES # BLD AUTO: 1.13 10*3/MM3 (ref 0.7–3.1)
LYMPHOCYTES NFR BLD AUTO: 11.9 % (ref 19.6–45.3)
Lab: ABNORMAL
Lab: ABNORMAL
MCH RBC QN AUTO: 25.9 PG (ref 26.6–33)
MCHC RBC AUTO-ENTMCNC: 30.2 G/DL (ref 31.5–35.7)
MCV RBC AUTO: 85.5 FL (ref 79–97)
METHGB BLD QL: 0.2 % (ref 0–3)
MODALITY: ABNORMAL
MONOCYTES # BLD AUTO: 0.88 10*3/MM3 (ref 0.1–0.9)
MONOCYTES NFR BLD AUTO: 9.2 % (ref 5–12)
NEUTROPHILS NFR BLD AUTO: 7.11 10*3/MM3 (ref 1.7–7)
NEUTROPHILS NFR BLD AUTO: 74.7 % (ref 42.7–76)
NOTIFIED BY: ABNORMAL
NOTIFIED WHO: ABNORMAL
NRBC BLD AUTO-RTO: 0 /100 WBC (ref 0–0.2)
NT-PROBNP SERPL-MCNC: 92.9 PG/ML (ref 0–1800)
OXYHGB MFR BLDV: 72.6 % (ref 94–99)
PCO2 BLDA: 42.5 MM HG (ref 35–45)
PCO2 TEMP ADJ BLD: ABNORMAL MM[HG]
PH BLDA: 7.41 PH UNITS (ref 7.35–7.45)
PH, TEMP CORRECTED: ABNORMAL
PLATELET # BLD AUTO: 242 10*3/MM3 (ref 140–450)
PMV BLD AUTO: 10.5 FL (ref 6–12)
PO2 BLDA: 41.5 MM HG (ref 83–108)
PO2 TEMP ADJ BLD: ABNORMAL MM[HG]
POTASSIUM SERPL-SCNC: 4 MMOL/L (ref 3.5–5.2)
PROCALCITONIN SERPL-MCNC: 0.15 NG/ML (ref 0–0.25)
PROT SERPL-MCNC: 7.6 G/DL (ref 6–8.5)
PROTHROMBIN TIME: 14.8 SECONDS (ref 12.1–14.7)
QT INTERVAL: 376 MS
QTC INTERVAL: 454 MS
RBC # BLD AUTO: 4.41 10*6/MM3 (ref 3.77–5.28)
SAO2 % BLDCOA: 74.1 % (ref 94–99)
SARS-COV-2 RNA RESP QL NAA+PROBE: NOT DETECTED
SODIUM SERPL-SCNC: 138 MMOL/L (ref 136–145)
TROPONIN T SERPL HS-MCNC: 18 NG/L
VENTILATOR MODE: ABNORMAL
WBC NRBC COR # BLD AUTO: 9.52 10*3/MM3 (ref 3.4–10.8)
WHOLE BLOOD HOLD COAG: NORMAL
WHOLE BLOOD HOLD SPECIMEN: NORMAL

## 2024-06-06 PROCEDURE — 25010000002 VANCOMYCIN 5 G RECONSTITUTED SOLUTION: Performed by: STUDENT IN AN ORGANIZED HEALTH CARE EDUCATION/TRAINING PROGRAM

## 2024-06-06 PROCEDURE — 96375 TX/PRO/DX INJ NEW DRUG ADDON: CPT

## 2024-06-06 PROCEDURE — 87636 SARSCOV2 & INF A&B AMP PRB: CPT | Performed by: STUDENT IN AN ORGANIZED HEALTH CARE EDUCATION/TRAINING PROGRAM

## 2024-06-06 PROCEDURE — 71045 X-RAY EXAM CHEST 1 VIEW: CPT | Performed by: RADIOLOGY

## 2024-06-06 PROCEDURE — 36600 WITHDRAWAL OF ARTERIAL BLOOD: CPT

## 2024-06-06 PROCEDURE — 25010000002 METHYLPREDNISOLONE PER 125 MG: Performed by: STUDENT IN AN ORGANIZED HEALTH CARE EDUCATION/TRAINING PROGRAM

## 2024-06-06 PROCEDURE — 96367 TX/PROPH/DG ADDL SEQ IV INF: CPT

## 2024-06-06 PROCEDURE — 83605 ASSAY OF LACTIC ACID: CPT | Performed by: STUDENT IN AN ORGANIZED HEALTH CARE EDUCATION/TRAINING PROGRAM

## 2024-06-06 PROCEDURE — 96366 THER/PROPH/DIAG IV INF ADDON: CPT

## 2024-06-06 PROCEDURE — 83880 ASSAY OF NATRIURETIC PEPTIDE: CPT | Performed by: STUDENT IN AN ORGANIZED HEALTH CARE EDUCATION/TRAINING PROGRAM

## 2024-06-06 PROCEDURE — 80053 COMPREHEN METABOLIC PANEL: CPT | Performed by: STUDENT IN AN ORGANIZED HEALTH CARE EDUCATION/TRAINING PROGRAM

## 2024-06-06 PROCEDURE — 85025 COMPLETE CBC W/AUTO DIFF WBC: CPT | Performed by: STUDENT IN AN ORGANIZED HEALTH CARE EDUCATION/TRAINING PROGRAM

## 2024-06-06 PROCEDURE — 87040 BLOOD CULTURE FOR BACTERIA: CPT | Performed by: STUDENT IN AN ORGANIZED HEALTH CARE EDUCATION/TRAINING PROGRAM

## 2024-06-06 PROCEDURE — 84484 ASSAY OF TROPONIN QUANT: CPT | Performed by: STUDENT IN AN ORGANIZED HEALTH CARE EDUCATION/TRAINING PROGRAM

## 2024-06-06 PROCEDURE — 25010000002 CEFEPIME PER 500 MG: Performed by: STUDENT IN AN ORGANIZED HEALTH CARE EDUCATION/TRAINING PROGRAM

## 2024-06-06 PROCEDURE — 93005 ELECTROCARDIOGRAM TRACING: CPT | Performed by: STUDENT IN AN ORGANIZED HEALTH CARE EDUCATION/TRAINING PROGRAM

## 2024-06-06 PROCEDURE — 86140 C-REACTIVE PROTEIN: CPT | Performed by: STUDENT IN AN ORGANIZED HEALTH CARE EDUCATION/TRAINING PROGRAM

## 2024-06-06 PROCEDURE — 87070 CULTURE OTHR SPECIMN AEROBIC: CPT | Performed by: STUDENT IN AN ORGANIZED HEALTH CARE EDUCATION/TRAINING PROGRAM

## 2024-06-06 PROCEDURE — 84145 PROCALCITONIN (PCT): CPT | Performed by: STUDENT IN AN ORGANIZED HEALTH CARE EDUCATION/TRAINING PROGRAM

## 2024-06-06 PROCEDURE — 87205 SMEAR GRAM STAIN: CPT | Performed by: STUDENT IN AN ORGANIZED HEALTH CARE EDUCATION/TRAINING PROGRAM

## 2024-06-06 PROCEDURE — 94640 AIRWAY INHALATION TREATMENT: CPT

## 2024-06-06 PROCEDURE — 94799 UNLISTED PULMONARY SVC/PX: CPT

## 2024-06-06 PROCEDURE — 82805 BLOOD GASES W/O2 SATURATION: CPT

## 2024-06-06 PROCEDURE — 85610 PROTHROMBIN TIME: CPT | Performed by: STUDENT IN AN ORGANIZED HEALTH CARE EDUCATION/TRAINING PROGRAM

## 2024-06-06 PROCEDURE — 82375 ASSAY CARBOXYHB QUANT: CPT

## 2024-06-06 PROCEDURE — 83050 HGB METHEMOGLOBIN QUAN: CPT

## 2024-06-06 PROCEDURE — 71045 X-RAY EXAM CHEST 1 VIEW: CPT

## 2024-06-06 PROCEDURE — 96365 THER/PROPH/DIAG IV INF INIT: CPT

## 2024-06-06 PROCEDURE — 25810000003 SODIUM CHLORIDE 0.9 % SOLUTION: Performed by: STUDENT IN AN ORGANIZED HEALTH CARE EDUCATION/TRAINING PROGRAM

## 2024-06-06 PROCEDURE — 99284 EMERGENCY DEPT VISIT MOD MDM: CPT

## 2024-06-06 PROCEDURE — 36415 COLL VENOUS BLD VENIPUNCTURE: CPT

## 2024-06-06 RX ORDER — DOXYCYCLINE HYCLATE 100 MG/1
100 CAPSULE ORAL 2 TIMES DAILY
Qty: 20 CAPSULE | Refills: 0 | Status: SHIPPED | OUTPATIENT
Start: 2024-06-06 | End: 2024-06-13

## 2024-06-06 RX ORDER — SODIUM CHLORIDE 0.9 % (FLUSH) 0.9 %
10 SYRINGE (ML) INJECTION AS NEEDED
Status: DISCONTINUED | OUTPATIENT
Start: 2024-06-06 | End: 2024-06-06 | Stop reason: HOSPADM

## 2024-06-06 RX ORDER — PREDNISONE 20 MG/1
40 TABLET ORAL DAILY
Qty: 10 TABLET | Refills: 0 | Status: SHIPPED | OUTPATIENT
Start: 2024-06-06 | End: 2024-06-11

## 2024-06-06 RX ORDER — LEVOFLOXACIN 750 MG/1
750 TABLET, FILM COATED ORAL
Qty: 5 TABLET | Refills: 0 | Status: SHIPPED | OUTPATIENT
Start: 2024-06-06 | End: 2024-06-16

## 2024-06-06 RX ORDER — METHYLPREDNISOLONE SODIUM SUCCINATE 125 MG/2ML
125 INJECTION, POWDER, LYOPHILIZED, FOR SOLUTION INTRAMUSCULAR; INTRAVENOUS ONCE
Status: COMPLETED | OUTPATIENT
Start: 2024-06-06 | End: 2024-06-06

## 2024-06-06 RX ORDER — GUAIFENESIN 600 MG/1
1200 TABLET, EXTENDED RELEASE ORAL 2 TIMES DAILY
Qty: 40 TABLET | Refills: 0 | Status: SHIPPED | OUTPATIENT
Start: 2024-06-06 | End: 2024-06-16

## 2024-06-06 RX ORDER — PREDNISONE 20 MG/1
40 TABLET ORAL DAILY
Qty: 10 TABLET | Refills: 0 | Status: SHIPPED | OUTPATIENT
Start: 2024-06-06 | End: 2024-06-06

## 2024-06-06 RX ORDER — GUAIFENESIN 600 MG/1
1200 TABLET, EXTENDED RELEASE ORAL 2 TIMES DAILY
Qty: 40 TABLET | Refills: 0 | Status: SHIPPED | OUTPATIENT
Start: 2024-06-06 | End: 2024-06-06

## 2024-06-06 RX ORDER — LEVOFLOXACIN 750 MG/1
750 TABLET, FILM COATED ORAL
Qty: 5 TABLET | Refills: 0 | Status: SHIPPED | OUTPATIENT
Start: 2024-06-06 | End: 2024-06-06

## 2024-06-06 RX ORDER — ESOMEPRAZOLE MAGNESIUM 40 MG/1
40 CAPSULE, DELAYED RELEASE ORAL DAILY
Qty: 30 CAPSULE | Refills: 0 | Status: SHIPPED | OUTPATIENT
Start: 2024-06-06 | End: 2024-07-06

## 2024-06-06 RX ORDER — DOXYCYCLINE HYCLATE 100 MG/1
100 CAPSULE ORAL 2 TIMES DAILY
Qty: 20 CAPSULE | Refills: 0 | Status: SHIPPED | OUTPATIENT
Start: 2024-06-06 | End: 2024-06-06

## 2024-06-06 RX ORDER — ATORVASTATIN CALCIUM 40 MG/1
40 TABLET, FILM COATED ORAL NIGHTLY
Qty: 30 TABLET | Refills: 0 | Status: SHIPPED | OUTPATIENT
Start: 2024-06-06 | End: 2024-07-06

## 2024-06-06 RX ORDER — ESOMEPRAZOLE MAGNESIUM 40 MG/1
40 CAPSULE, DELAYED RELEASE ORAL DAILY
Qty: 30 CAPSULE | Refills: 0 | Status: SHIPPED | OUTPATIENT
Start: 2024-06-06 | End: 2024-06-06

## 2024-06-06 RX ORDER — ALBUTEROL SULFATE 2.5 MG/3ML
2.5 SOLUTION RESPIRATORY (INHALATION) ONCE
Status: COMPLETED | OUTPATIENT
Start: 2024-06-06 | End: 2024-06-06

## 2024-06-06 RX ORDER — ATORVASTATIN CALCIUM 40 MG/1
40 TABLET, FILM COATED ORAL NIGHTLY
Qty: 30 TABLET | Refills: 0 | Status: SHIPPED | OUTPATIENT
Start: 2024-06-06 | End: 2024-06-06

## 2024-06-06 RX ADMIN — ALBUTEROL SULFATE 2.5 MG: 2.5 SOLUTION RESPIRATORY (INHALATION) at 15:42

## 2024-06-06 RX ADMIN — CEFEPIME 2000 MG: 2 INJECTION, POWDER, FOR SOLUTION INTRAVENOUS at 17:04

## 2024-06-06 RX ADMIN — VANCOMYCIN HYDROCHLORIDE 1750 MG: 5 INJECTION, POWDER, LYOPHILIZED, FOR SOLUTION INTRAVENOUS at 17:36

## 2024-06-06 RX ADMIN — METHYLPREDNISOLONE SODIUM SUCCINATE 125 MG: 125 INJECTION, POWDER, FOR SOLUTION INTRAMUSCULAR; INTRAVENOUS at 16:01

## 2024-06-06 RX ADMIN — SODIUM CHLORIDE 1000 ML: 9 INJECTION, SOLUTION INTRAVENOUS at 16:02

## 2024-06-06 NOTE — ED PROVIDER NOTES
Subjective   History of Present Illness  Patient is a 78-year-old female with history significant for CKD, COPD on 2 L at bedtime and HFpEF who comes to the ER with reports of worsening shortness of breath, productive sputum and subjective fevers at home.  She has non-small cell lung cancer and is currently on chemotherapy.  She was a previous heavy smoker but has since stopped.  She denies any chest pain/pressure or tightness.  Denies any diarrhea or abdominal symptoms.      Review of Systems   Constitutional:  Positive for chills and fever. Negative for fatigue.   HENT:  Negative for ear pain, sinus pain and sore throat.    Respiratory:  Positive for cough, shortness of breath and wheezing. Negative for chest tightness.    Cardiovascular:  Negative for chest pain, palpitations and leg swelling.   Gastrointestinal:  Negative for abdominal pain, constipation, diarrhea, nausea and vomiting.   Genitourinary:  Negative for dysuria, hematuria and urgency.   Musculoskeletal:  Negative for arthralgias and myalgias.   Neurological:  Negative for dizziness, syncope and light-headedness.   Psychiatric/Behavioral:  Negative for confusion.        Past Medical History:   Diagnosis Date    Arthritis     CHF (congestive heart failure)     Chronic anticoagulation     Eliquis    Chronic kidney disease     stage II/IIIa    Collapsed lung     COPD (chronic obstructive pulmonary disease)     Diabetes mellitus     TYPE 2    Elevated cholesterol     GERD (gastroesophageal reflux disease)     Hyperlipidemia     Hypertension     Non-small cell lung cancer RUL     Sleep apnea     non compliant with CPAP    Stroke 2019    Wears eyeglasses        Allergies   Allergen Reactions    Paclitaxel Anaphylaxis       Past Surgical History:   Procedure Laterality Date    BRONCHOSCOPY Bilateral 02/27/2023    Procedure: BRONCHOSCOPY WITH ENDOBRONCHIAL ULTRASOUND;  Surgeon: Abdulkadir Peter MD;  Location: [facility];  Service: Pulmonary;  Laterality:  Bilateral;    BRONCHOSCOPY WITH ION ROBOTIC ASSIST N/A 2023    Procedure: BRONCHOSCOPY WITH ION ROBOT AND EBUS;  Surgeon: John Clemens MD;  Location:  [facility];  Service: Robotics - Pulmonary;  Laterality: N/A;  Ion cath #6 - 0032,  #6 - 0030, cath guide # 0077. EBUS scope removed with balloon intact.    CARDIAC CATHETERIZATION N/A 2017    Procedure: Left Heart Cath;  Surgeon: Jatinder Matias MD;  [facility];  Service:     CARDIAC CATHETERIZATION N/A 2021    Procedure: Left Heart Cath;  Surgeon: Tolu Steinberg MD;  [facility];  Service: Cardiology;  Laterality: N/A;    COLONOSCOPY      ENDOSCOPY      GALLBLADDER SURGERY      PORTACATH PLACEMENT N/A 2023    Procedure: INSERTION OF PORTACATH;  Surgeon: Petr Velásquez MD;  [facility];  Service: General;  Laterality: N/A;    VENTRAL HERNIA REPAIR N/A 2020    Procedure: VENTRAL HERNIA REPAIR LAPAROSCOPIC WITH DAVINCI ROBOT;  Surgeon: Petr Velásquez MD;  [facility];  Service: DaVinci;  Laterality: N/A;       Family History   Problem Relation Age of Onset    Heart disease Mother         Rhianna sophia    Heart disease Father     Heart attack Father     Heart failure Father        Social History     Socioeconomic History    Marital status: Single    Number of children: 6   Tobacco Use    Smoking status: Former     Current packs/day: 0.00     Average packs/day: 3.0 packs/day for 16.7 years (50.2 ttl pk-yrs)     Types: Cigarettes     Start date: 4/3/1998     Quit date: 2015     Years since quittin.4     Passive exposure: Past    Smokeless tobacco: Never   Vaping Use    Vaping status: Never Used   Substance and Sexual Activity    Alcohol use: No    Drug use: No    Sexual activity: Never           Objective   Physical Exam  Vitals and nursing note reviewed.   Constitutional:       Appearance: She is well-developed and normal weight.    HENT:      Head: Normocephalic and atraumatic.      Mouth/Throat:      Mouth: Mucous membranes are moist.      Pharynx: Oropharynx is clear.   Eyes:      Extraocular Movements: Extraocular movements intact.      Pupils: Pupils are equal, round, and reactive to light.   Cardiovascular:      Rate and Rhythm: Normal rate and regular rhythm.   Pulmonary:      Effort: Pulmonary effort is normal. No accessory muscle usage or respiratory distress.      Breath sounds: Examination of the right-upper field reveals wheezing. Examination of the left-upper field reveals wheezing. Examination of the right-middle field reveals wheezing. Examination of the left-middle field reveals wheezing. Wheezing present.   Abdominal:      General: Bowel sounds are normal.      Palpations: Abdomen is soft.   Musculoskeletal:         General: Normal range of motion.      Cervical back: Normal range of motion and neck supple.      Right lower leg: No edema.      Left lower leg: No edema.   Skin:     General: Skin is warm and dry.      Capillary Refill: Capillary refill takes less than 2 seconds.   Neurological:      General: No focal deficit present.      Mental Status: She is alert and oriented to person, place, and time.   Psychiatric:         Mood and Affect: Mood normal.         Behavior: Behavior normal.         Procedures           ED Course  ED Course as of 06/06/24 1818   Thu Jun 06, 2024   1521 ECG 12 Lead Dyspnea  Normal sinus rhythm, rate 88, QTc 454, no acute ST or T wave changes [CW]      ED Course User Index  [CW] Ruperto Venegas,                                              Medical Decision Making  --On arrival, SpO2 83% on room air, diffuse wheezing and rhonchi bilaterally.  Chronically anticoagulant Eliquis this for PE very unlikely  --Labs unremarkable  --Chest x-ray bilateral pneumonia  --1 L IV fluids, vancomycin/cefepime x 1, steroids, DuoNebs  --Patient is supposed to be on 2 L nasal cannula 24/7 but only wears  it intermittently.  On 2 L SpO2 was 97%.  Felt better after steroids and DuoNebs.  Bilateral pneumonia noted on imaging.  DC on Levaquin every other day due to renal function for total of 10 days, Doxy, steroids, mucolytic's.  Started Symbicort, refer to COPD clinic.  Discussed with patient and family, patient agreeable to treatment plan and ready to go home    Problems Addressed:  COPD exacerbation: complicated acute illness or injury  Non-small cell cancer of right lung: complicated acute illness or injury  Pneumonia due to infectious organism, unspecified laterality, unspecified part of lung: complicated acute illness or injury  Sepsis due to pneumonia: complicated acute illness or injury    Amount and/or Complexity of Data Reviewed  Labs: ordered.  Radiology: ordered.  ECG/medicine tests: ordered. Decision-making details documented in ED Course.    Risk  OTC drugs.  Prescription drug management.        Final diagnoses:   Pneumonia due to infectious organism, unspecified laterality, unspecified part of lung   COPD exacerbation   Sepsis due to pneumonia   Non-small cell cancer of right lung       ED Disposition  ED Disposition       ED Disposition   Discharge    Condition   Stable    Comment   --               Niki Antony, APRN  [address]  [facility] [address]  [phone]    In 1 week      [facility]  [address] [place] [id number] [phone]             Medication List        New Prescriptions      doxycycline 100 MG capsule  Commonly known as: VIBRAMYCIN  Take 1 capsule by mouth 2 (Two) Times a Day for 10 days.     guaiFENesin 600 MG 12 hr tablet  Commonly known as: Mucinex  Take 2 tablets by mouth 2 (Two) Times a Day for 10 days.     levoFLOXacin 750 MG tablet  Commonly known as: LEVAQUIN  Take 1 tablet by mouth Every Other Day for 10 days.     predniSONE 20 MG tablet  Commonly known as: DELTASONE  Take 2 tablets by mouth Daily for 5 days.            Changed       atorvastatin 40 MG tablet  Commonly known as: LIPITOR  Take 1 tablet by mouth Every Night for 30 days.  What changed:   medication strength  how much to take  when to take this     esomeprazole 40 MG capsule  Commonly known as: nexIUM  Take 1 capsule by mouth Daily for 30 days.  What changed:   medication strength  how much to take            Stop      doxycycline 100 MG capsule  Commonly known as: MONODOX               Where to Get Your Medications        These medications were sent to [place], [place] - [address] [facility] [address] - [phone] [facility] [phone]   [address] [facility] [address] [facility] [address]      Phone: [phone]   atorvastatin 40 MG tablet  doxycycline 100 MG capsule  esomeprazole 40 MG capsule  guaiFENesin 600 MG 12 hr tablet  levoFLOXacin 750 MG tablet  predniSONE 20 MG tablet            Ruperto Venegas DO  06/06/24 1817

## 2024-06-08 LAB
BACTERIA SPEC RESP CULT: NORMAL
GRAM STN SPEC: NORMAL

## 2024-06-11 LAB
BACTERIA SPEC AEROBE CULT: NORMAL
BACTERIA SPEC AEROBE CULT: NORMAL

## 2024-06-13 ENCOUNTER — LAB (OUTPATIENT)
Dept: ONCOLOGY | Facility: HOSPITAL | Age: 79
End: 2024-06-13
Payer: MEDICARE

## 2024-06-13 ENCOUNTER — OFFICE VISIT (OUTPATIENT)
Dept: ONCOLOGY | Facility: CLINIC | Age: 79
End: 2024-06-13
Payer: MEDICARE

## 2024-06-13 ENCOUNTER — INFUSION (OUTPATIENT)
Dept: ONCOLOGY | Facility: HOSPITAL | Age: 79
End: 2024-06-13
Payer: MEDICARE

## 2024-06-13 VITALS
BODY MASS INDEX: 32.6 KG/M2 | RESPIRATION RATE: 18 BRPM | DIASTOLIC BLOOD PRESSURE: 63 MMHG | WEIGHT: 202 LBS | TEMPERATURE: 98.1 F | SYSTOLIC BLOOD PRESSURE: 107 MMHG | OXYGEN SATURATION: 92 % | HEART RATE: 60 BPM

## 2024-06-13 DIAGNOSIS — C34.91 NON-SMALL CELL CANCER OF RIGHT LUNG: ICD-10-CM

## 2024-06-13 DIAGNOSIS — C34.11 PRIMARY CANCER OF RIGHT UPPER LOBE OF LUNG: ICD-10-CM

## 2024-06-13 DIAGNOSIS — Z95.828 PORT-A-CATH IN PLACE: Primary | ICD-10-CM

## 2024-06-13 DIAGNOSIS — D50.9 IRON DEFICIENCY ANEMIA, UNSPECIFIED IRON DEFICIENCY ANEMIA TYPE: ICD-10-CM

## 2024-06-13 DIAGNOSIS — C34.91 NON-SMALL CELL CANCER OF RIGHT LUNG: Primary | ICD-10-CM

## 2024-06-13 DIAGNOSIS — D64.9 ACUTE ANEMIA: ICD-10-CM

## 2024-06-13 LAB
ALBUMIN SERPL-MCNC: 3.8 G/DL (ref 3.5–5.2)
ALBUMIN/GLOB SERPL: 1.2 G/DL
ALP SERPL-CCNC: 112 U/L (ref 39–117)
ALT SERPL W P-5'-P-CCNC: 9 U/L (ref 1–33)
ANION GAP SERPL CALCULATED.3IONS-SCNC: 11.2 MMOL/L (ref 5–15)
AST SERPL-CCNC: 12 U/L (ref 1–32)
BASOPHILS # BLD AUTO: 0.06 10*3/MM3 (ref 0–0.2)
BASOPHILS NFR BLD AUTO: 0.5 % (ref 0–1.5)
BILIRUB SERPL-MCNC: 0.3 MG/DL (ref 0–1.2)
BUN SERPL-MCNC: 22 MG/DL (ref 8–23)
BUN/CREAT SERPL: 18 (ref 7–25)
CALCIUM SPEC-SCNC: 9.2 MG/DL (ref 8.6–10.5)
CHLORIDE SERPL-SCNC: 103 MMOL/L (ref 98–107)
CO2 SERPL-SCNC: 27.8 MMOL/L (ref 22–29)
CREAT SERPL-MCNC: 1.22 MG/DL (ref 0.57–1)
DEPRECATED RDW RBC AUTO: 39.4 FL (ref 37–54)
EGFRCR SERPLBLD CKD-EPI 2021: 45.8 ML/MIN/1.73
EOSINOPHIL # BLD AUTO: 0.05 10*3/MM3 (ref 0–0.4)
EOSINOPHIL NFR BLD AUTO: 0.4 % (ref 0.3–6.2)
ERYTHROCYTE [DISTWIDTH] IN BLOOD BY AUTOMATED COUNT: 12.9 % (ref 12.3–15.4)
GLOBULIN UR ELPH-MCNC: 3.1 GM/DL
GLUCOSE SERPL-MCNC: 154 MG/DL (ref 65–99)
HCT VFR BLD AUTO: 36.7 % (ref 34–46.6)
HGB BLD-MCNC: 11.2 G/DL (ref 12–15.9)
IMM GRANULOCYTES # BLD AUTO: 0.21 10*3/MM3 (ref 0–0.05)
IMM GRANULOCYTES NFR BLD AUTO: 1.7 % (ref 0–0.5)
LYMPHOCYTES # BLD AUTO: 1.59 10*3/MM3 (ref 0.7–3.1)
LYMPHOCYTES NFR BLD AUTO: 13.2 % (ref 19.6–45.3)
MCH RBC QN AUTO: 25.9 PG (ref 26.6–33)
MCHC RBC AUTO-ENTMCNC: 30.5 G/DL (ref 31.5–35.7)
MCV RBC AUTO: 84.8 FL (ref 79–97)
MONOCYTES # BLD AUTO: 0.81 10*3/MM3 (ref 0.1–0.9)
MONOCYTES NFR BLD AUTO: 6.7 % (ref 5–12)
NEUTROPHILS NFR BLD AUTO: 77.5 % (ref 42.7–76)
NEUTROPHILS NFR BLD AUTO: 9.3 10*3/MM3 (ref 1.7–7)
NRBC BLD AUTO-RTO: 0 /100 WBC (ref 0–0.2)
PLATELET # BLD AUTO: 338 10*3/MM3 (ref 140–450)
PMV BLD AUTO: 10.6 FL (ref 6–12)
POTASSIUM SERPL-SCNC: 3.9 MMOL/L (ref 3.5–5.2)
PROT SERPL-MCNC: 6.9 G/DL (ref 6–8.5)
RBC # BLD AUTO: 4.33 10*6/MM3 (ref 3.77–5.28)
SODIUM SERPL-SCNC: 142 MMOL/L (ref 136–145)
WBC NRBC COR # BLD AUTO: 12.02 10*3/MM3 (ref 3.4–10.8)

## 2024-06-13 PROCEDURE — 85025 COMPLETE CBC W/AUTO DIFF WBC: CPT

## 2024-06-13 PROCEDURE — 25010000002 DURVALUMAB 500 MG/10ML SOLUTION 10 ML VIAL: Performed by: INTERNAL MEDICINE

## 2024-06-13 PROCEDURE — 25810000003 SODIUM CHLORIDE 0.9 % SOLUTION 250 ML FLEX CONT: Performed by: INTERNAL MEDICINE

## 2024-06-13 PROCEDURE — 25810000003 SODIUM CHLORIDE 0.9 % SOLUTION: Performed by: INTERNAL MEDICINE

## 2024-06-13 PROCEDURE — 25010000002 HEPARIN LOCK FLUSH PER 10 UNITS: Performed by: NURSE PRACTITIONER

## 2024-06-13 PROCEDURE — 80053 COMPREHEN METABOLIC PANEL: CPT

## 2024-06-13 PROCEDURE — 96413 CHEMO IV INFUSION 1 HR: CPT

## 2024-06-13 RX ORDER — HEPARIN SODIUM (PORCINE) LOCK FLUSH IV SOLN 100 UNIT/ML 100 UNIT/ML
300 SOLUTION INTRAVENOUS ONCE
OUTPATIENT
Start: 2024-06-13

## 2024-06-13 RX ORDER — HEPARIN SODIUM (PORCINE) LOCK FLUSH IV SOLN 100 UNIT/ML 100 UNIT/ML
500 SOLUTION INTRAVENOUS AS NEEDED
OUTPATIENT
Start: 2024-06-13

## 2024-06-13 RX ORDER — SODIUM CHLORIDE 0.9 % (FLUSH) 0.9 %
10 SYRINGE (ML) INJECTION AS NEEDED
Status: DISCONTINUED | OUTPATIENT
Start: 2024-06-13 | End: 2024-06-13 | Stop reason: HOSPADM

## 2024-06-13 RX ORDER — SODIUM CHLORIDE 0.9 % (FLUSH) 0.9 %
10 SYRINGE (ML) INJECTION AS NEEDED
OUTPATIENT
Start: 2024-06-13

## 2024-06-13 RX ORDER — SODIUM CHLORIDE 9 MG/ML
250 INJECTION, SOLUTION INTRAVENOUS ONCE
OUTPATIENT
Start: 2024-06-27

## 2024-06-13 RX ORDER — SODIUM CHLORIDE 9 MG/ML
250 INJECTION, SOLUTION INTRAVENOUS ONCE
OUTPATIENT
Start: 2024-07-11

## 2024-06-13 RX ORDER — SODIUM CHLORIDE 0.9 % (FLUSH) 0.9 %
20 SYRINGE (ML) INJECTION AS NEEDED
OUTPATIENT
Start: 2024-06-13

## 2024-06-13 RX ORDER — HEPARIN SODIUM (PORCINE) LOCK FLUSH IV SOLN 100 UNIT/ML 100 UNIT/ML
500 SOLUTION INTRAVENOUS AS NEEDED
Status: DISCONTINUED | OUTPATIENT
Start: 2024-06-13 | End: 2024-06-13 | Stop reason: HOSPADM

## 2024-06-13 RX ADMIN — SODIUM CHLORIDE 1000 MG: 9 INJECTION, SOLUTION INTRAVENOUS at 14:53

## 2024-06-13 RX ADMIN — HEPARIN 500 UNITS: 100 SYRINGE at 15:57

## 2024-06-13 RX ADMIN — SODIUM CHLORIDE 500 ML: 9 INJECTION, SOLUTION INTRAVENOUS at 14:35

## 2024-06-13 RX ADMIN — Medication 10 ML: at 15:57

## 2024-06-13 NOTE — PROGRESS NOTES
Subjective     Date: 2024    Referring Provider  No ref. provider found    Chief Complaint  Symptomatic anemia   2. Non small Cell Lung Cancer  Cancer Staging   Stage IIIA (cT1c, cN2, cM0)        Subjective      Mayra Hays is a 78 y.o. female who presents today to [facility] HEMATOLOGY & ONCOLOGY for follow up.    HPI:   78 y.o. female with past medical history of diabetes mellitus type 2, hyperlipidemia, COPD, hypertension, atrial fibrillation on anticoagulation (Eliquis), h/o CVA and previous tobacco use presents for symptomatic anemia and NSCLC.    She is present today with her daughter, Hailey, who is assisting with history. Pt started work up for RUL lung nodule , since then noticed to have a decrease in her Hgb to 10.7 from normal in 2023. More recently, her Hgb 8/9 was 6.5, she was directed to ED where she received 1U PRBC.     Patient reports increased fatigue over the last several months. She reports no bleeding, however does report dark colored stool (melena) two days ago. Denies any other evidence of melena or hematochezia.     She has previously been diagnosed with iron deficiency (years ago) requiring oral iron supplements, but never received IV iron or bone marrow biopsy.    She and her daughter are unsure of her last colonoscopy and endoscopy history, think it may have occurred >5 years ago but unsure.    She denies recent weight loss, fever, chills,  night sweats.  Previous smoker, quit 5-6 years ago, smoked 3 packs/day X 30 years. Denies alcohol or drug use. Family history significant for son with leukemia, brothers with lung cancer.     Oncology History:  2023: low dose CT chest screenin.9 cm spiculated right upper lobe pulmonary nodule concerning for malignancy, PET/CT recommended.   2023: EBUS by Dr. Peter negative for malignancy for bronchial washing and FNA of station 10R  2023: PET/CT: Right upper lobe pulmonary nodule, more  posterior possible satellite nodules are postobstructive process measuring 2.1 cm with mSUV 14.5. Also with pretracheal region lymph node 2.6 cm.   6/29/2023: CT guided needle biopsy was non diagnostic, showed fibrosis and chronic inflammation.   8/9/2023: Lab work showed anemia with Hgb 6.5. Referred to ED. Bronchoscopy deferred.   9/6/2023: Navigational bronchoscopy performed by Dr. Clemens- Right upper lobe lung transbronchial biopsy revealed squamous cell carcinoma, lymph node at station 4R also involved with squamous cell carcinoma. PD-L1 TPS 60%. Patient not deemed a surgical candidate.  9/13-11/21/2023: Received weekly carboplatin  X 7 and radiation. Patient had a reaction to paclitaxel, it was omitted.   12/19/2023: Post treatment CT chest with improvement of masslike process RUL now 1.6 cm, suggest significant response to treatment. Mediastinal lymph nodes are now within normal limits of size.     Ms. Hays presents today with her daughter, grand daughter, and great grand daughters. She is in a wheelchair. She lives alone with her grand son, able to perform her ADLs including cooking, cleaning.     Treatment history:        2.       Interval History 10/11/2023   Ms. Hays presents for follow up today, accompanied by her daughter in law. She started treatment with paclitaxel and carboplatin on 10/9, unfortunately had a anaphylactic reaction to taxol after 8 minutes of infusion causing her to go to the ED. Patient was awake and feeling back to herself at the time of ED visit. Carboplatin was not administered.     She reports doing well overall, no new symptoms. Started radiation therapy yesterday.      Interval History 10/24/2023   Ms. Hays presents for cycle 2 of carboplatin with concurrent radiation. Did well with first cycle. Denies any adverse effects including nausea, vomiting, diarrhea, neuropathy. Appetite is good. Radiation is going well. No complaints today.  Denies any skin rash or  bites.    Interval History 11/21/2023   Ms. Hays presents today for cycle 7 of carboplatin with concurrent radiation. She reports good tolerance thus far without neuropathy, nausea, vomiting, diarrhea. Appetite is stable.   Denies any GIB.    Interval History 01/11/2024   Ms. Hays presents after completion of therapy. She was admitted to the hospital 12/31 due to shortness of breath, found to have Afib with RVR and coronavirus HKU. She received inpatient antibiotics and steroids. Currently with improvement, still reports some fatigue.   We reviewed her last CT chest, which shows improvement after treatment. Discussed continuing immunotherapy.      Interval History 02/22/2024   Ms. Hays presents today for follow, prior to cycle 3 of maintenance durvalumab. Accompanied by her grand daughter's fiance. She reports improved energy. Denies any bleeding, nausea, vomiting, diarrhea, rash. On examination, noted to have petechia on bilateral distal lower extremities and R distal upper extremities, she believes these started today. Denies pruritus.     Interval History 03/21/2024    presents prior to C5 of consolidation therapy with durvalumab. She reports good tolerance to the last treatment, denies shortness of breath, diarrhea, nausea/vomiting, fatigue. She's been applying lotion to her legs, petechia has decreased, primarily just on her left leg now.     Interval History 04/18/2024   Ms. Hays presents prior to C7 of durvalumab. Reports good tolerance without nausea, vomiting, diarrhea. No changes in petechia. Denies any other complaints.    Interval History 06/13/2024   Ms. Hays presents for follow up prior to cycle 11 of durvalumab. She presented to ED 6/6 due to shortness of breath and cough. CXR showed RUL and left basilar airspace disease for which she received levofloxacin. Today with improvement in symptoms. Denies diarrhea, nausea, vomiting.       Objective     Objective     Allergy:   Allergies    Allergen Reactions    Paclitaxel Anaphylaxis        Current Medications:   Current Outpatient Medications   Medication Sig Dispense Refill    albuterol sulfate  (90 Base) MCG/ACT inhaler Inhale 2 puffs Every 4 (Four) Hours As Needed for Wheezing. 18 g 5    apixaban (ELIQUIS) 5 MG tablet tablet Take 1 tablet by mouth Every 12 (Twelve) Hours. 180 tablet 3    atorvastatin (LIPITOR) 40 MG tablet Take 1 tablet by mouth Every Night for 30 days. 30 tablet 0    Budeson-Glycopyrrol-Formoterol (Breztri Aerosphere) 160-9-4.8 MCG/ACT aerosol inhaler Inhale 2 puffs 2 (Two) Times a Day. 10.7 g 8    esomeprazole (nexIUM) 40 MG capsule Take 1 capsule by mouth Daily for 30 days. 30 capsule 0    guaiFENesin (Mucinex) 600 MG 12 hr tablet Take 2 tablets by mouth 2 (Two) Times a Day for 10 days. 40 tablet 0    hydrOXYzine pamoate (VISTARIL) 25 MG capsule Take 1 capsule by mouth Every Night.      insulin glargine (LANTUS, SEMGLEE) 100 UNIT/ML injection Inject 15 Units under the skin into the appropriate area as directed Daily. (Patient not taking: Reported on 5/1/2024) 1 mL 12    ipratropium-albuterol (DUO-NEB) 0.5-2.5 mg/3 ml nebulizer USE 1 VIAL IN NEBULIZER 4 TIMES DAILY - and as needed 320 mL 11    levoFLOXacin (LEVAQUIN) 750 MG tablet Take 1 tablet by mouth Every Other Day for 10 days. 5 tablet 0    lidocaine-prilocaine (EMLA) 2.5-2.5 % cream Apply to port-a-cath site 30 minutes prior to arrival at infusion center. Cover with plastic wrap. 30 g 1    lisinopril (PRINIVIL,ZESTRIL) 5 MG tablet Take 1 tablet by mouth Daily. 90 tablet 3    metFORMIN (GLUCOPHAGE) 500 MG tablet Take 1 tablet by mouth 2 (Two) Times a Day As Needed (only takes if blood glucose is above 200). As needed      prochlorperazine (COMPAZINE) 10 MG tablet Take 1 tablet by mouth Every 6 (Six) Hours As Needed for Nausea or Vomiting. 30 tablet 1    spironolactone (ALDACTONE) 25 MG tablet Take 1 tablet by mouth Daily.       No current facility-administered  medications for this visit.     Facility-Administered Medications Ordered in Other Visits   Medication Dose Route Frequency Provider Last Rate Last Admin    heparin injection 500 Units  500 Units Intravenous PRN Gilbert, Bailee Sharmaine, APRN   500 Units at 01/26/24 1558    heparin injection 500 Units  500 Units Intravenous PRN Gilbert, Bailee Sharmaine, APRN   500 Units at 06/13/24 1557    sodium chloride 0.9 % flush 10 mL  10 mL Intravenous PRN Gilbert, Bailee Sharmaine, APRN        sodium chloride 0.9 % flush 10 mL  10 mL Intravenous PRN Gilbert, Bailee Sharmaine, APRN   10 mL at 06/13/24 1557       Past Medical History:  Past Medical History:   Diagnosis Date    Arthritis     CHF (congestive heart failure)     Chronic anticoagulation     Eliquis    Chronic kidney disease     stage II/IIIa    Collapsed lung     COPD (chronic obstructive pulmonary disease)     Diabetes mellitus     TYPE 2    Elevated cholesterol     GERD (gastroesophageal reflux disease)     Hyperlipidemia     Hypertension     Non-small cell lung cancer RUL     Sleep apnea     non compliant with CPAP    Stroke 2019    Wears eyeglasses        Past Surgical History:  Past Surgical History:   Procedure Laterality Date    BRONCHOSCOPY Bilateral 02/27/2023    Procedure: BRONCHOSCOPY WITH ENDOBRONCHIAL ULTRASOUND;  Surgeon: Abdulkadir Peter MD;  Location: [facility] OR;  Service: Pulmonary;  Laterality: Bilateral;    BRONCHOSCOPY WITH ION ROBOTIC ASSIST N/A 9/6/2023    Procedure: BRONCHOSCOPY WITH ION ROBOT AND EBUS;  Surgeon: John Clemens MD;  Location: [facility] ENDOSCOPY;  Service: Robotics - Pulmonary;  Laterality: N/A;  Ion cath #6 - 0032,  #6 - 0030, cath guide # 0077. EBUS scope removed with balloon intact.    CARDIAC CATHETERIZATION N/A 08/22/2017    Procedure: Left Heart Cath;  Surgeon: Jatinder Matias MD;  Location: [facility] CATH INVASIVE LOCATION;  Service:     CARDIAC CATHETERIZATION N/A 11/22/2021    Procedure: Left Heart Cath;  Surgeon: Tolu Steinberg  MD;  Location: [facility] CATH INVASIVE LOCATION;  Service: Cardiology;  Laterality: N/A;    COLONOSCOPY      ENDOSCOPY      GALLBLADDER SURGERY      PORTACATH PLACEMENT N/A 2023    Procedure: INSERTION OF PORTACATH;  Surgeon: Petr Velásquez MD;  Location: [facility] OR;  Service: General;  Laterality: N/A;    VENTRAL HERNIA REPAIR N/A 2020    Procedure: VENTRAL HERNIA REPAIR LAPAROSCOPIC WITH DAVINCI ROBOT;  Surgeon: Petr Velásquez MD;  Location: [facility] OR;  Service: DaVinci;  Laterality: N/A;       Social History:  Social History     Socioeconomic History    Marital status: Single    Number of children: 6   Tobacco Use    Smoking status: Former     Current packs/day: 0.00     Average packs/day: 3.0 packs/day for 16.7 years (50.2 ttl pk-yrs)     Types: Cigarettes     Start date: 4/3/1998     Quit date: 2015     Years since quittin.4     Passive exposure: Past    Smokeless tobacco: Never   Vaping Use    Vaping status: Never Used   Substance and Sexual Activity    Alcohol use: No    Drug use: No    Sexual activity: Never         Family History:  Family History   Problem Relation Age of Onset    Heart disease Mother         Rhianna sophia    Heart disease Father     Heart attack Father     Heart failure Father        Review of Systems:  Review of Systems   Constitutional:  Positive for fatigue.   All other systems reviewed and are negative.      Vital Signs:   There were no vitals taken for this visit.     Physical Exam:  Physical Exam  Vitals reviewed.   Constitutional:       General: She is not in acute distress.     Appearance: Normal appearance. She is obese. She is not ill-appearing.      Comments: +hard of hearing   HENT:      Head: Normocephalic and atraumatic.      Mouth/Throat:      Mouth: Mucous membranes are moist.      Pharynx: Oropharynx is clear.   Eyes:      Conjunctiva/sclera: Conjunctivae normal.      Pupils: Pupils are equal, round, and reactive to light.   Cardiovascular:       Rate and Rhythm: Normal rate and regular rhythm.      Heart sounds: No murmur heard.  Pulmonary:      Effort: Pulmonary effort is normal. No respiratory distress.      Breath sounds: Wheezing present.   Chest:      Comments: +R chest port  Abdominal:      General: Abdomen is flat. Bowel sounds are normal. There is no distension.      Palpations: Abdomen is soft. There is no mass.      Tenderness: There is no abdominal tenderness. There is no guarding.   Musculoskeletal:         General: No swelling. Normal range of motion.      Cervical back: Normal range of motion.   Lymphadenopathy:      Cervical: No cervical adenopathy.   Skin:     General: Skin is warm and dry.      Findings: Petechiae present.      Comments: LLE    Neurological:      General: No focal deficit present.      Mental Status: She is alert and oriented to person, place, and time. Mental status is at baseline.   Psychiatric:         Mood and Affect: Mood normal.         PHQ-9 Score  PHQ-9 Total Score:       Pain Score  There were no vitals filed for this visit.                                PAINSCOREQUALITYMETRIC:   There were no vitals filed for this visit.                       Lab Review  Lab Results   Component Value Date    WBC 12.02 (H) 06/13/2024    HGB 11.2 (L) 06/13/2024    HCT 36.7 06/13/2024    MCV 84.8 06/13/2024    RDW 12.9 06/13/2024     06/13/2024    NEUTRORELPCT 77.5 (H) 06/13/2024    LYMPHORELPCT 13.2 (L) 06/13/2024    MONORELPCT 6.7 06/13/2024    EOSRELPCT 0.4 06/13/2024    BASORELPCT 0.5 06/13/2024    NEUTROABS 9.30 (H) 06/13/2024    LYMPHSABS 1.59 06/13/2024     Lab Results   Component Value Date     06/13/2024    K 3.9 06/13/2024    CO2 27.8 06/13/2024     06/13/2024    BUN 22 06/13/2024    CREATININE 1.22 (H) 06/13/2024    EGFRIFNONA 51 (L) 11/22/2021    GLUCOSE 154 (H) 06/13/2024    CALCIUM 9.2 06/13/2024    ALKPHOS 112 06/13/2024    AST 12 06/13/2024    ALT 9 06/13/2024    BILITOT 0.3 06/13/2024    ALBUMIN  3.8 06/13/2024    PROTEINTOT 6.9 06/13/2024    MG 2.2 01/03/2024    PHOS 3.5 05/18/2020     Lab Results   Component Value Date    RETICCTPCT 4.26 (H) 08/16/2023     Lab Results   Component Value Date    FERRITIN 250.70 (H) 05/16/2024    IRON 31 (L) 05/16/2024    TIBC 297 (L) 05/16/2024    LABIRON 10 (L) 05/16/2024    FEGPTYYJ69 548 08/14/2023    FOLATE 12.90 08/14/2023        Radiology Results    CT Chest With Contrast Diagnostic (05/01/2024 09:01)     IMPRESSION:  1. Development of airspace disease in the right upper lobe which is in  the region of a previously noted spiculated nodule likely reflecting  posttreatment related change.  2. Interval improvement in the previously noted bilateral lower lobe  airspace disease with a few patchy opacities remaining.    CT Chest Without Contrast Diagnostic (12/31/2023 20:30)   IMPRESSION:  Spiculated nodule of the right upper lobe concerning for neoplasm.  Masslike consolidation in the right lower lobe could represent pneumonia  with underlying mass not excluded. Atelectasis/airspace disease in the  left lower lobe and lingula. Mediastinal adenopathy. Follow-up  recommended.    CT Chest With Contrast Diagnostic (12/19/2023 14:47)     IMPRESSION:  1.  Significant decrease size and masslike appearance of process in the  right upper lobe which would suggest significant response to treatment.  2.  No suspicious pulmonary nodules or masses identified.  3.  Mediastinal lymph nodes are now within normal limits for size.  No  hilar or mediastinal adenopathy.  4.  Mild centrilobular nodularity of the left lower lobe which may  reflect changes of atypical pneumonia or aspiration pneumonitis.  5.  Stable cardiomegaly.  6.  Other incidental/nonacute findings above.      CT Angiogram Chest Pulmonary Embolism (10/09/2023 16:41)   MPRESSION:  1.  Right upper lobe mass in association with pneumonia has increased in  size and extent when compared to the previous exam with extension to  the  pleural surface. Masslike component is approximately 56.8 x 44.5 mm and  was previously 40.1 x 36.7 mm. This would correspond to primary  malignancy.  2.  Central bronchial wall thickening. Can be seen with reactive airway  disease or bronchitis.  3.  1.3 cm left adrenal nodule is noted. No significant change from  previous.  4.  Increased size of paratracheal and mediastinal lymph nodes. A right  paratracheal lymph node is 2.5 cm and was previously 2.2 cm.  5. No PE identified.  6. Marked cardiomegaly, stable.  7. Otherwise stable chest with other nonacute/incidental findings as  above.    CT Head Without Contrast (10/09/2023 12:00)   IMPRESSION:    Unremarkable exam demonstrating no CT evidence of acute intracranial  findings.      CT Chest Without Contrast Diagnostic (08/09/2023 12:38)       NM PET/CT Skull Base to Mid Thigh (06/20/2023 11:12)         CT Chest Low Dose Cancer Screening WO (02/14/2023 12:58)   IMPRESSION:    Interval development or enlargement of a 1.9 cm spiculated right upper  lobe pulmonary nodule concerning in appearance for malignancy and PET/CT  is recommended for further evaluation.        Pathology:     Tissue Pathology Exam (09/06/2023 08:13)         6/29/23           Assessment / Plan         Assessment and Plan   Mayra Hays is a 78 y.o. year old presents for       Non small cell lung cancer   Cancer Staging   Stage IIIA (cT1c, cN2, cM0)  -Oncology history as above. Patient with 1.9 cm spiculated right upper lobe nodule on low dose CT chest screen (2/2023). EBUS by Dr. Peter negative for malignancy (2/2023). PET/CT with hypermetabolism RUL 2.1 cm and pretracheal region lymph node (6/2023). CT guided biopsy negative for malignancy (6/2023). Navigational bronchoscopy with positive RUL and  4R (paratracheal) for squamous cell (9/6/2023). PD-L1 TPS score 60%.   -Patient not deemed for surgical resection. We reviewed her staging and treatment options which include concurrent  chemo-radiation followed by adjuvant immunotherapy. Chemotherapy agents to be used would include weekly carboplatin AUC2 and paclitaxel 50 mg/m2. Chemo will be followed by one year of Durvalumab based on PACIFIC trial.   -Patient experienced anaphylaxis 8 minutes into the paclitaxel on first cycle 10/9. Due to this, paclitaxel will be omitted from weekly chemotherapy.  -Radiation therapy 10/10; 6000 cGy in 30 treatment fractions. Complete 11/21/2023  -Weekly carboplatin with concurrent radiation 10/18-11/21/2023  -Post treatment CT 12/19/23 with good/partial response  -Cont maintenance durvalumab every 2 weeks; C11 today    2. Anemia; iron deficiency anemia due to GIB  - Patient with normal H/H in our system 2/2023, with acute drop in Hgb 6/29 (10.7) to Hgb (6.5) on 8/9 requiring transfusion. Patient reports one episode of melena two days ago (she is a poor historian).   -Last colonoscopy and endoscopy are unknown; think it may have been >5 years ago  -CBC from 8/14 show Hgb 8.2, Hct 27, MCV 90. Normal WBC and platelet count. Iron studies with normal iron (37), TIBC (435), and low iron saturation (9). This is in light of recent blood transfusion. Normal folate and B12 level. Reticulocyte count noted to be elevated to 6%  -Patient is with fatigue. Denies shortness of breath (aside from baseline COPD) or chest pain. Continues to be on Eliquis for Afib  -Initial work up from consultation confirmed iron deficiency anemia, normal folate/b12 levels. No indication of hemolysis seen with normal LDH and haptoglobin. Myeloma work up has been negative with no M spike on serum protein electrophoresis and normal k/l free light chain ratio. Peripheral smear with normal total WBC without granulocytic dysplasia or blasts.  -Received Ferumoxytol 8/29/2023 and 11/27/23  -Pt to see surgery post treatment for repeat EGD and Colonoscopy     3. Malignancy associated pain  - Currently denies    4. Surveillance (per NCCN)  -Surveillance  would include CT with and without contrast every 3 to 6 months for 3 years, then every 6 months for 2 years, then low-dose noncontrast CT annually.   Next CT 8/2024    5. Elevated Cr   - Cr up trending 1.22 today  - Give 500cc NS with treatment     Discussed possible differential diagnoses, testing, treatment, recommended non-pharmacological interventions, risks, warning signs to monitor for that would indicate need for follow-up in clinic or ER. If no improvement with these regimens or you have new or worsening symptoms follow-up. Patient verbalizes understanding and agreement with plan of care. Denies further needs or concerns.     Patient was given instructions and counseling regarding her condition and for health maintenance advised.       All questions were answered to her satisfaction.              Meds ordered during this visit  No orders of the defined types were placed in this encounter.      Visit Diagnoses    ICD-10-CM ICD-9-CM   1. Non-small cell cancer of right lung  C34.91 162.9   2. Primary cancer of right upper lobe of lung  C34.11 162.3                       Follow Up   In 1 month with tx with CBC, iron studies, ferritin, CMP  Order CT chest to be done 8/2024         This document has been electronically signed by Shawna Bond MD   June 13, 2024 16:59 EDT    Dictated Utilizing Dragon Dictation: Part of this note may be an electronic transcription/translation of spoken language to printed text using the Dragon Dictation System.

## 2024-06-22 NOTE — PROGRESS NOTES
Name:Bibi Diaz  MRN#:[id number]  :1943      Subjective:  Constipated.     Objective:  Dennies drain site tenderness.  Drain is CDI.     Vital Signs:  Blood pressure (!) 171/83, pulse 78, temperature 98.3 °F (36.8 °C), temperature source Oral, resp. rate 22, height 60\", weight 96 lb 3.2 oz, SpO2 97%, not currently breastfeeding.    Input/Output:    Intake/Output Summary (Last 24 hours) at 2024 1501  Last data filed at 2024 0607  Gross per 24 hour   Intake 1076 ml   Output 1418 ml   Net -342 ml     24 hour drain output: 10cc    Labs:  Lab Results   Component Value Date    WBC 8.4 2024    RBC 3.60 2024    HGB 9.9 2024    HCT 29.9 2024    MCV 83.1 2024    MCH 27.5 2024    MCHC 33.1 2024    RDW 15.8 2024    .0 2024       Microbiology:  2+ growth Escherichia coli Abnormal        4+ growth Streptococcus anginosus Abnormal        2+ growth Streptococcus constellatus Abnormal        2+ growth Pseudomonas aeruginosa Abnormal        Assessment/Plan:  79 yo female with 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  S/p drainage of dominant abscess.  Other abscesses are not drainable from percutaneous approach.  Dominant abscess is no longer measurable but drainage is feculent suggesting persistent contained perforation. General surgery is following patient. Abscessogram not beneficial at this time given the feculent drainage.  CPM.     Luke Gautam MD  2024  3:01 PM   Nutrition Services    Patient Name:  Mayra Hays  YOB: 1945  MRN: [id number]  Admit Date:  6/12/2018  Diet changed to consistent carbohydrate/cardiac to meet assessed needs - education provided    Electronically signed by:  María Elena Saab RD  06/14/18 3:19 PM

## 2024-07-02 ENCOUNTER — LAB (OUTPATIENT)
Dept: ONCOLOGY | Facility: HOSPITAL | Age: 79
End: 2024-07-02
Payer: MEDICARE

## 2024-07-02 ENCOUNTER — INFUSION (OUTPATIENT)
Dept: ONCOLOGY | Facility: HOSPITAL | Age: 79
End: 2024-07-02
Payer: MEDICARE

## 2024-07-02 VITALS
SYSTOLIC BLOOD PRESSURE: 139 MMHG | DIASTOLIC BLOOD PRESSURE: 72 MMHG | TEMPERATURE: 97.8 F | WEIGHT: 203.2 LBS | BODY MASS INDEX: 32.8 KG/M2 | HEART RATE: 83 BPM | RESPIRATION RATE: 18 BRPM | OXYGEN SATURATION: 93 %

## 2024-07-02 DIAGNOSIS — Z95.828 PORT-A-CATH IN PLACE: Primary | ICD-10-CM

## 2024-07-02 DIAGNOSIS — C34.91 NON-SMALL CELL CANCER OF RIGHT LUNG: ICD-10-CM

## 2024-07-02 DIAGNOSIS — C34.11 PRIMARY CANCER OF RIGHT UPPER LOBE OF LUNG: ICD-10-CM

## 2024-07-02 LAB
ALBUMIN SERPL-MCNC: 4.1 G/DL (ref 3.5–5.2)
ALBUMIN/GLOB SERPL: 1.2 G/DL
ALP SERPL-CCNC: 114 U/L (ref 39–117)
ALT SERPL W P-5'-P-CCNC: 10 U/L (ref 1–33)
ANION GAP SERPL CALCULATED.3IONS-SCNC: 10.8 MMOL/L (ref 5–15)
AST SERPL-CCNC: 12 U/L (ref 1–32)
BASOPHILS # BLD AUTO: 0.05 10*3/MM3 (ref 0–0.2)
BASOPHILS NFR BLD AUTO: 0.7 % (ref 0–1.5)
BILIRUB SERPL-MCNC: 0.4 MG/DL (ref 0–1.2)
BUN SERPL-MCNC: 17 MG/DL (ref 8–23)
BUN/CREAT SERPL: 17.7 (ref 7–25)
CALCIUM SPEC-SCNC: 9.9 MG/DL (ref 8.6–10.5)
CHLORIDE SERPL-SCNC: 102 MMOL/L (ref 98–107)
CO2 SERPL-SCNC: 27.2 MMOL/L (ref 22–29)
CREAT SERPL-MCNC: 0.96 MG/DL (ref 0.57–1)
DEPRECATED RDW RBC AUTO: 43.4 FL (ref 37–54)
EGFRCR SERPLBLD CKD-EPI 2021: 61.1 ML/MIN/1.73
EOSINOPHIL # BLD AUTO: 0.28 10*3/MM3 (ref 0–0.4)
EOSINOPHIL NFR BLD AUTO: 3.8 % (ref 0.3–6.2)
ERYTHROCYTE [DISTWIDTH] IN BLOOD BY AUTOMATED COUNT: 14.2 % (ref 12.3–15.4)
GLOBULIN UR ELPH-MCNC: 3.3 GM/DL
GLUCOSE SERPL-MCNC: 142 MG/DL (ref 65–99)
HCT VFR BLD AUTO: 38 % (ref 34–46.6)
HGB BLD-MCNC: 11.9 G/DL (ref 12–15.9)
IMM GRANULOCYTES # BLD AUTO: 0.05 10*3/MM3 (ref 0–0.05)
IMM GRANULOCYTES NFR BLD AUTO: 0.7 % (ref 0–0.5)
LYMPHOCYTES # BLD AUTO: 1.1 10*3/MM3 (ref 0.7–3.1)
LYMPHOCYTES NFR BLD AUTO: 14.8 % (ref 19.6–45.3)
MCH RBC QN AUTO: 26.3 PG (ref 26.6–33)
MCHC RBC AUTO-ENTMCNC: 31.3 G/DL (ref 31.5–35.7)
MCV RBC AUTO: 83.9 FL (ref 79–97)
MONOCYTES # BLD AUTO: 0.59 10*3/MM3 (ref 0.1–0.9)
MONOCYTES NFR BLD AUTO: 7.9 % (ref 5–12)
NEUTROPHILS NFR BLD AUTO: 5.38 10*3/MM3 (ref 1.7–7)
NEUTROPHILS NFR BLD AUTO: 72.1 % (ref 42.7–76)
NRBC BLD AUTO-RTO: 0 /100 WBC (ref 0–0.2)
PLATELET # BLD AUTO: 209 10*3/MM3 (ref 140–450)
PMV BLD AUTO: 11.1 FL (ref 6–12)
POTASSIUM SERPL-SCNC: 3.9 MMOL/L (ref 3.5–5.2)
PROT SERPL-MCNC: 7.4 G/DL (ref 6–8.5)
RBC # BLD AUTO: 4.53 10*6/MM3 (ref 3.77–5.28)
SODIUM SERPL-SCNC: 140 MMOL/L (ref 136–145)
WBC NRBC COR # BLD AUTO: 7.45 10*3/MM3 (ref 3.4–10.8)

## 2024-07-02 PROCEDURE — 96413 CHEMO IV INFUSION 1 HR: CPT

## 2024-07-02 PROCEDURE — 25010000002 HEPARIN LOCK FLUSH PER 10 UNITS: Performed by: NURSE PRACTITIONER

## 2024-07-02 PROCEDURE — 25810000003 SODIUM CHLORIDE 0.9 % SOLUTION: Performed by: INTERNAL MEDICINE

## 2024-07-02 PROCEDURE — 25810000003 SODIUM CHLORIDE 0.9 % SOLUTION 250 ML FLEX CONT: Performed by: INTERNAL MEDICINE

## 2024-07-02 PROCEDURE — 85025 COMPLETE CBC W/AUTO DIFF WBC: CPT

## 2024-07-02 PROCEDURE — 80053 COMPREHEN METABOLIC PANEL: CPT

## 2024-07-02 PROCEDURE — 25010000002 DURVALUMAB 500 MG/10ML SOLUTION 10 ML VIAL: Performed by: INTERNAL MEDICINE

## 2024-07-02 RX ORDER — SODIUM CHLORIDE 9 MG/ML
250 INJECTION, SOLUTION INTRAVENOUS ONCE
Status: COMPLETED | OUTPATIENT
Start: 2024-07-02 | End: 2024-07-02

## 2024-07-02 RX ORDER — SODIUM CHLORIDE 0.9 % (FLUSH) 0.9 %
10 SYRINGE (ML) INJECTION AS NEEDED
Status: DISCONTINUED | OUTPATIENT
Start: 2024-07-02 | End: 2024-07-02 | Stop reason: HOSPADM

## 2024-07-02 RX ORDER — HEPARIN SODIUM (PORCINE) LOCK FLUSH IV SOLN 100 UNIT/ML 100 UNIT/ML
300 SOLUTION INTRAVENOUS ONCE
OUTPATIENT
Start: 2024-07-02

## 2024-07-02 RX ORDER — HEPARIN SODIUM (PORCINE) LOCK FLUSH IV SOLN 100 UNIT/ML 100 UNIT/ML
500 SOLUTION INTRAVENOUS AS NEEDED
Status: DISCONTINUED | OUTPATIENT
Start: 2024-07-02 | End: 2024-07-02 | Stop reason: HOSPADM

## 2024-07-02 RX ORDER — SODIUM CHLORIDE 0.9 % (FLUSH) 0.9 %
10 SYRINGE (ML) INJECTION AS NEEDED
OUTPATIENT
Start: 2024-07-02

## 2024-07-02 RX ORDER — HEPARIN SODIUM (PORCINE) LOCK FLUSH IV SOLN 100 UNIT/ML 100 UNIT/ML
500 SOLUTION INTRAVENOUS AS NEEDED
OUTPATIENT
Start: 2024-07-02

## 2024-07-02 RX ORDER — SODIUM CHLORIDE 0.9 % (FLUSH) 0.9 %
20 SYRINGE (ML) INJECTION AS NEEDED
OUTPATIENT
Start: 2024-07-02

## 2024-07-02 RX ADMIN — SODIUM CHLORIDE 250 ML: 9 INJECTION, SOLUTION INTRAVENOUS at 14:26

## 2024-07-02 RX ADMIN — Medication 500 UNITS: at 15:27

## 2024-07-02 RX ADMIN — Medication 10 ML: at 15:27

## 2024-07-02 RX ADMIN — SODIUM CHLORIDE 950 MG: 9 INJECTION, SOLUTION INTRAVENOUS at 14:26

## 2024-07-08 ENCOUNTER — HOSPITAL ENCOUNTER (OUTPATIENT)
Dept: PULMONOLOGY | Facility: HOSPITAL | Age: 79
Discharge: HOME | End: 2024-07-08
Payer: MEDICARE

## 2024-07-08 VITALS
DIASTOLIC BLOOD PRESSURE: 75 MMHG | WEIGHT: 203.8 LBS | SYSTOLIC BLOOD PRESSURE: 149 MMHG | HEART RATE: 64 BPM | OXYGEN SATURATION: 93 % | HEIGHT: 69 IN | BODY MASS INDEX: 30.18 KG/M2

## 2024-07-08 DIAGNOSIS — J44.9 CHRONIC OBSTRUCTIVE PULMONARY DISEASE, UNSPECIFIED COPD TYPE: Primary | ICD-10-CM

## 2024-07-08 DIAGNOSIS — J96.11 CHRONIC RESPIRATORY FAILURE WITH HYPOXIA: ICD-10-CM

## 2024-07-08 DIAGNOSIS — Z87.01 HISTORY OF RECENT PNEUMONIA: ICD-10-CM

## 2024-07-08 DIAGNOSIS — Z87.891 FORMER SMOKER: ICD-10-CM

## 2024-07-08 DIAGNOSIS — C34.91 NON-SMALL CELL CANCER OF RIGHT LUNG: ICD-10-CM

## 2024-07-08 DIAGNOSIS — J44.9 CHRONIC OBSTRUCTIVE PULMONARY DISEASE, UNSPECIFIED COPD TYPE: ICD-10-CM

## 2024-07-08 PROCEDURE — 99215 OFFICE O/P EST HI 40 MIN: CPT | Performed by: PHYSICIAN ASSISTANT

## 2024-07-08 PROCEDURE — 94618 PULMONARY STRESS TESTING: CPT | Performed by: PHYSICIAN ASSISTANT

## 2024-07-08 RX ORDER — BUDESONIDE, GLYCOPYRROLATE, AND FORMOTEROL FUMARATE 160; 9; 4.8 UG/1; UG/1; UG/1
2 AEROSOL, METERED RESPIRATORY (INHALATION) 2 TIMES DAILY
Qty: 10.7 G | Refills: 6 | Status: SHIPPED | OUTPATIENT
Start: 2024-07-08

## 2024-07-08 NOTE — PROGRESS NOTES
[facility]  COPD Management       Mayra Hays is a 78 y.o. female seen in the [facility] for COPD. Patient has had two recent ED visits for pneumonia (24 and 24) and was prescribed doxycyline and levofloxacin, along with prednisone and mucinex most recently. Of note, patient does have non-small cell lung cancer and is presently getting chemotherapy.     The patient reports that she only uses her albuterol inhaler in the morning and sometimes in the evening. She also reports that she uses her nebulizer sometimes during the day. These are the only breathing medications that she uses.     Patient has a poor understanding of her medications and is not sure what she takes on a daily basis. She reports that her daughter takes care of her medications and puts them in a pill box for her. She reports that she is adherent to what is in the pill box, taking it twice a day.     Pt reports she is not a smoker and quit 5-6 years ago.       Past Medical History:   Diagnosis Date    Arthritis     CHF (congestive heart failure)     Chronic anticoagulation     Eliquis    Chronic kidney disease     stage II/IIIa    Collapsed lung     COPD (chronic obstructive pulmonary disease)     Diabetes mellitus     TYPE 2    Elevated cholesterol     GERD (gastroesophageal reflux disease)     Hyperlipidemia     Hypertension     Non-small cell lung cancer RUL     Sleep apnea     non compliant with CPAP    Stroke 2019    Wears eyeglasses      Social History     Socioeconomic History    Marital status: Single    Number of children: 6   Tobacco Use    Smoking status: Former     Current packs/day: 0.00     Average packs/day: 3.0 packs/day for 16.7 years (50.2 ttl pk-yrs)     Types: Cigarettes     Start date: 4/3/1998     Quit date: 2015     Years since quittin.5     Passive exposure: Past    Smokeless tobacco: Never   Vaping Use    Vaping status: Never Used   Substance and Sexual Activity     Alcohol use: No    Drug use: No    Sexual activity: Never     Paclitaxel    Current Outpatient Medications:     albuterol sulfate  (90 Base) MCG/ACT inhaler, Inhale 2 puffs Every 4 (Four) Hours As Needed for Wheezing., Disp: 18 g, Rfl: 5    apixaban (ELIQUIS) 5 MG tablet tablet, Take 1 tablet by mouth Every 12 (Twelve) Hours., Disp: 180 tablet, Rfl: 3    Budeson-Glycopyrrol-Formoterol (Breztri Aerosphere) 160-9-4.8 MCG/ACT aerosol inhaler, Inhale 2 puffs 2 (Two) Times a Day., Disp: 10.7 g, Rfl: 8    hydrOXYzine pamoate (VISTARIL) 25 MG capsule, Take 1 capsule by mouth Every Night., Disp: , Rfl:     insulin glargine (LANTUS, SEMGLEE) 100 UNIT/ML injection, Inject 15 Units under the skin into the appropriate area as directed Daily. (Patient not taking: Reported on 5/1/2024), Disp: 1 mL, Rfl: 12    ipratropium-albuterol (DUO-NEB) 0.5-2.5 mg/3 ml nebulizer, USE 1 VIAL IN NEBULIZER 4 TIMES DAILY - and as needed, Disp: 320 mL, Rfl: 11    lidocaine-prilocaine (EMLA) 2.5-2.5 % cream, Apply to port-a-cath site 30 minutes prior to arrival at infusion center. Cover with plastic wrap., Disp: 30 g, Rfl: 1    lisinopril (PRINIVIL,ZESTRIL) 5 MG tablet, Take 1 tablet by mouth Daily., Disp: 90 tablet, Rfl: 3    metFORMIN (GLUCOPHAGE) 500 MG tablet, Take 1 tablet by mouth 2 (Two) Times a Day As Needed (only takes if blood glucose is above 200). As needed, Disp: , Rfl:     prochlorperazine (COMPAZINE) 10 MG tablet, Take 1 tablet by mouth Every 6 (Six) Hours As Needed for Nausea or Vomiting., Disp: 30 tablet, Rfl: 1    spironolactone (ALDACTONE) 25 MG tablet, Take 1 tablet by mouth Daily., Disp: , Rfl:   No current facility-administered medications for this encounter.    Facility-Administered Medications Ordered in Other Encounters:     heparin injection 500 Units, 500 Units, Intravenous, PRN, Bailee Gan SharmaineERASTO, 500 Units at 01/26/24 1558    sodium chloride 0.9 % flush 10 mL, 10 mL, Intravenous, PRN, Bailee Gan  "AIRAM SchmidLATOYA      Vaccination Status   COVID 19: not interested in   Influenza: refused 2024; not interested in   Pneumococcal: not interested in   Zoster: not interested in     Drug-Drug Interactions: CNS depressants- Compazine + Hydroxyzine- reports dizziness when she has not eaten    Drug-Disease Interactions (non-cardioselective beta blockers): N/A    Patient Assistance: N/A    Inhaler Technique Observed? No  If yes, notes:     Treatment Goals: Risk Reduction and Symptom Control     Medication Assessment & Plan:     -I tried calling Hailey, the patient's daughter and POA and had to leave a message. Was going to go over med list with her and see what inhalers she is currently refilling for the patient. I used the \"Medication Dispense History\" tab to best do her med review.     -I called [facility] and confirmed that the last time she filled Breztri was on 5/31/24 for a 30 day supply.     - I called Hailey back after 11, when she was supposed to be on lunch and she answered. I updated the patient's med list. Concerning her inhalers, Hailey reports that she does not know if she uses them since she does not live with the patient. I let her know that the Breztri had not been filled since 5/31/24 and she then reported that she is not taking it then.       Jocelyn to restart Breztri 2 puffs bid. I made Hailey aware of this over the phone. I asked her to have the pharmacist at [facility] go over the administration and priming with her, but did briefly  her on it. I counseled her that this is an inhaler to be used every day twice a day for maintenance. I also counseled her on having the patient rinse her mouth with water after use and shaking prior to use. I also advised her to keep up with refills on this particular medication.      Refill was sent to [facility] and has a $0 copay.     Recommended vaccination- Prevnar and Shingrix- pt refused    Thank you for allowing me to participate in the care of your patient,    Elizabet " MADELYN Junior  7/8/2024  10:03 EDT

## 2024-07-08 NOTE — PROGRESS NOTES
COPD CLINIC Questionnaire      APPOINTMENT DATE/TIME:____24___10:08 EDT___________  NAME:___Mayra Hays  :_1945_  DX_COPD       LENGTH OF DX:3-4 years  PULMONOLOGIST:_yes  LAST OUTPATIENT PULMONARY VISIT:_   DO YOU USE NIPPV:_No_  Device:__None  RECENT WEIGHT LOSS:   _No __  # OF PILLOWS DURING SLEEP:___1__  FLAT OR INCLINED BED:__couch_  DO YOU HAVE DYSPNEA:__Yes_  DYSPNEA DURING:_Rest_  SMOKE HX:former smoker, quit 5-6 years ago  #_OF HOSPITAL STAYS IN THE LAST YEAR DUE TO LUNG DISEASE:_1_  # OF ER VISITS IN THE LAST YEAR DUE TO SOB:__1          mMRC Dyspnea Scale        mMRC SCORE: 2         STOP BANG Screening Questionnaire       Snoring?   Do you snore loudly (loud enough to be heard through closed doors or that your bed partner elbows you for snoring at night)?     no  Tired?   Do you often feel tired, fatigued, or sleepy during the daytime (such as falling asleep during driving or talking to someone)?     yes  Observed?   Has anyone observed you stop breathing or choking/gasping during your sleep?    no  Pressure?   Do you have or are you being treated for high blood pressure?           no  Body mass index (BMI) more than 35 kg/m2?       no  Age older than 50?      yes  Neck size large (measured around Sushant's apple)? Is your shirt collar 16 inches (40 cm) or larger?   no  Gender (biologic sex): male?      no  STOP BANG Score:____2_____          STOP BANG Interpretation     Risk category Risk factors    Low risk             Yes to 0 to 2 of the questions    Intermediate risk Yes to 3 to 4 questions    High risk  Yes to 5 to 8 questions    High risk  Yes to 2 or more of 4 STOP questions and BMI >35 kg/m2    High risk  Yes to 2 or more of 4 STOP questions and neck circumference ?16 inches (?40 cm)    High risk  Yes to 2 or more of 4 STOP questions and male gender (biologic sex)               Grade: A    Comments:

## 2024-07-08 NOTE — PROGRESS NOTES
Subjective      Chief Complaint  Initial Evaluation    Subjective      History of Present Illness  Mayra Hays is a 78 y.o. female who presents today to [facility] with past medical history of chronic HFpEF, essential hypertension, hyperlipidemia, paroxysmal atrial fibrillation, CKD, type II diabetes mellitus, iron deficiency anemia, non-small cell lung cancer of right lung on chemotherapy, COPD, chronic hypoxic respiratory failure, and tobacco abuse who presents today for Initial Evaluation. This visit is a initial evaluation  appointment.     Initial Evaluation:  Patient was recently evaluated in the ED on 06/06/2024 and was diagnosed with bilateral pneumonia. She was discharged home on Levaquin, doxycycline, mucolytic's, and steroids. She was referred to the COPD clinic for education and further evaluation. She states that she does feel that her breathing has improved since her ED visit. She states that she isn't very familiar with what medications she takes at home as her daughter, Hailey, manages this. She reports that she does use her nebulizer treatments but isn't sure if she uses her inhalers or not. She has supplemental oxygen (stationary oxygen concentrator) which she uses at night. She previously had portable oxygen tanks but states that they were too heavy so returned these to the [employer]. She has been trying to get approved for a POC but is required to utilize 8 tanks for 3 consecutive months before she qualifies. She follows closely with oncology due to previous diagnosed of non-small cell lung cancer of left lung. She has completed radiation treatments and remains on chemotherapy with Durvalumab.         Current Outpatient Medications:     albuterol sulfate  (90 Base) MCG/ACT inhaler, Inhale 2 puffs Every 4 (Four) Hours As Needed for Wheezing., Disp: 18 g, Rfl: 5    apixaban (ELIQUIS) 5 MG tablet tablet, Take 1 tablet by mouth Every 12 (Twelve) Hours., Disp:  "180 tablet, Rfl: 3    Budeson-Glycopyrrol-Formoterol (Breztri Aerosphere) 160-9-4.8 MCG/ACT aerosol inhaler, Inhale 2 puffs 2 (Two) Times a Day., Disp: 10.7 g, Rfl: 6    hydrOXYzine pamoate (VISTARIL) 25 MG capsule, Take 1 capsule by mouth Every Night., Disp: , Rfl:     ipratropium-albuterol (DUO-NEB) 0.5-2.5 mg/3 ml nebulizer, USE 1 VIAL IN NEBULIZER 4 TIMES DAILY - and as needed, Disp: 320 mL, Rfl: 11    lidocaine-prilocaine (EMLA) 2.5-2.5 % cream, Apply to port-a-cath site 30 minutes prior to arrival at infusion center. Cover with plastic wrap., Disp: 30 g, Rfl: 1    lisinopril (PRINIVIL,ZESTRIL) 5 MG tablet, Take 1 tablet by mouth Daily., Disp: 90 tablet, Rfl: 3    metFORMIN (GLUCOPHAGE) 500 MG tablet, Take 1 tablet by mouth 2 (Two) Times a Day As Needed (only takes if blood glucose is above 200). As needed, Disp: , Rfl:     prochlorperazine (COMPAZINE) 10 MG tablet, Take 1 tablet by mouth Every 6 (Six) Hours As Needed for Nausea or Vomiting., Disp: 30 tablet, Rfl: 1    spironolactone (ALDACTONE) 25 MG tablet, Take 1 tablet by mouth Daily., Disp: , Rfl:   No current facility-administered medications for this encounter.    Facility-Administered Medications Ordered in Other Encounters:     heparin injection 500 Units, 500 Units, Intravenous, PRN, Bailee Gan APRN, 500 Units at 01/26/24 1558    sodium chloride 0.9 % flush 10 mL, 10 mL, Intravenous, PRN, Bailee Gan, APRN      Allergies   Allergen Reactions    Paclitaxel Anaphylaxis       Objective     Objective   Vital Signs:  /75   Pulse 64   Ht 175.3 cm (69\")   Wt 92.4 kg (203 lb 12.8 oz)   SpO2 93%   BMI 30.10 kg/m²   Estimated body mass index is 30.1 kg/m² as calculated from the following:    Height as of this encounter: 175.3 cm (69\").    Weight as of this encounter: 92.4 kg (203 lb 12.8 oz).    Past Medical History:   Diagnosis Date    Arthritis     CHF (congestive heart failure)     Chronic anticoagulation     Eliquis    Chronic " kidney disease     stage II/IIIa    Collapsed lung     COPD (chronic obstructive pulmonary disease)     Diabetes mellitus     TYPE 2    Elevated cholesterol     GERD (gastroesophageal reflux disease)     Hyperlipidemia     Hypertension     Non-small cell lung cancer RUL     Sleep apnea     non compliant with CPAP    Stroke 2019    Wears eyeglasses      Past Surgical History:   Procedure Laterality Date    BRONCHOSCOPY Bilateral 02/27/2023    Procedure: BRONCHOSCOPY WITH ENDOBRONCHIAL ULTRASOUND;  Surgeon: Abdulkadir Peter MD;  Location: [facility] OR;  Service: Pulmonary;  Laterality: Bilateral;    BRONCHOSCOPY WITH ION ROBOTIC ASSIST N/A 9/6/2023    Procedure: BRONCHOSCOPY WITH ION ROBOT AND EBUS;  Surgeon: John Clemens MD;  Location:  [facility];  Service: Robotics - Pulmonary;  Laterality: N/A;  Ion cath #6 - 0032,  #6 - 0030, cath guide # 0077. EBUS scope removed with balloon intact.    CARDIAC CATHETERIZATION N/A 08/22/2017    Procedure: Left Heart Cath;  Surgeon: Jatinder Matias MD;  Location: [facility] CATH INVASIVE LOCATION;  Service:     CARDIAC CATHETERIZATION N/A 11/22/2021    Procedure: Left Heart Cath;  Surgeon: Tolu Steinebrg MD;  Location:  [facility] CATH INVASIVE LOCATION;  Service: Cardiology;  Laterality: N/A;    COLONOSCOPY      ENDOSCOPY      GALLBLADDER SURGERY      PORTACATH PLACEMENT N/A 9/29/2023    Procedure: INSERTION OF PORTACATH;  Surgeon: Petr Velásquez MD;  Location: [facility] OR;  Service: General;  Laterality: N/A;    VENTRAL HERNIA REPAIR N/A 05/14/2020    Procedure: VENTRAL HERNIA REPAIR LAPAROSCOPIC WITH DAVINCI ROBOT;  Surgeon: Petr Velásquez MD;  Location: [facility] OR;  Service: DaVinci;  Laterality: N/A;     Social History     Socioeconomic History    Marital status: Single    Number of children: 6   Tobacco Use    Smoking status: Former     Current packs/day: 0.00     Average packs/day: 3.0 packs/day for 16.7 years (50.2 ttl pk-yrs)     Types: Cigarettes      "Start date: 4/3/1998     Quit date: 2015     Years since quittin.5     Passive exposure: Past    Smokeless tobacco: Never   Vaping Use    Vaping status: Never Used   Substance and Sexual Activity    Alcohol use: No    Drug use: No    Sexual activity: Never        Physical Exam  Constitutional:       General: She is awake.      Appearance: Normal appearance. She is obese.   HENT:      Head: Normocephalic and atraumatic.      Nose: Nose normal.      Mouth/Throat:      Mouth: Mucous membranes are moist.      Pharynx: Oropharynx is clear.   Eyes:      Conjunctiva/sclera: Conjunctivae normal.      Pupils: Pupils are equal, round, and reactive to light.   Cardiovascular:      Rate and Rhythm: Normal rate and regular rhythm.      Pulses: Normal pulses.      Heart sounds: Normal heart sounds. No murmur heard.     No friction rub. No gallop.   Pulmonary:      Effort: Pulmonary effort is normal. No tachypnea, accessory muscle usage or respiratory distress.      Breath sounds: Normal breath sounds. Rhonchi (cleared with coughing) present. No decreased breath sounds, wheezing or rales.   Musculoskeletal:         General: Normal range of motion.      Cervical back: Full passive range of motion without pain and normal range of motion.   Skin:     General: Skin is warm and dry.   Neurological:      General: No focal deficit present.      Mental Status: She is alert. Mental status is at baseline.   Psychiatric:         Mood and Affect: Mood normal.         Behavior: Behavior normal. Behavior is cooperative.         Thought Content: Thought content normal.          Result Review :  The following labs and radiology results have been reviewed.    Lab Review:   No results found for: \"FEV1\", \"FVC\", \"FZD6UBY\", \"TLC\", \"DLCO\"  Lab on 2024   Component Date Value Ref Range Status    Glucose 2024 142 (H)  65 - 99 mg/dL Final    BUN 2024 17  8 - 23 mg/dL Final    Creatinine 2024 0.96  0.57 - 1.00 mg/dL Final    " Sodium 07/02/2024 140  136 - 145 mmol/L Final    Potassium 07/02/2024 3.9  3.5 - 5.2 mmol/L Final    Chloride 07/02/2024 102  98 - 107 mmol/L Final    CO2 07/02/2024 27.2  22.0 - 29.0 mmol/L Final    Calcium 07/02/2024 9.9  8.6 - 10.5 mg/dL Final    Total Protein 07/02/2024 7.4  6.0 - 8.5 g/dL Final    Albumin 07/02/2024 4.1  3.5 - 5.2 g/dL Final    ALT (SGPT) 07/02/2024 10  1 - 33 U/L Final    AST (SGOT) 07/02/2024 12  1 - 32 U/L Final    Alkaline Phosphatase 07/02/2024 114  39 - 117 U/L Final    Total Bilirubin 07/02/2024 0.4  0.0 - 1.2 mg/dL Final    Globulin 07/02/2024 3.3  gm/dL Final    A/G Ratio 07/02/2024 1.2  g/dL Final    BUN/Creatinine Ratio 07/02/2024 17.7  7.0 - 25.0 Final    Anion Gap 07/02/2024 10.8  5.0 - 15.0 mmol/L Final    eGFR 07/02/2024 61.1  >60.0 mL/min/1.73 Final    WBC 07/02/2024 7.45  3.40 - 10.80 10*3/mm3 Final    RBC 07/02/2024 4.53  3.77 - 5.28 10*6/mm3 Final    Hemoglobin 07/02/2024 11.9 (L)  12.0 - 15.9 g/dL Final    Hematocrit 07/02/2024 38.0  34.0 - 46.6 % Final    MCV 07/02/2024 83.9  79.0 - 97.0 fL Final    MCH 07/02/2024 26.3 (L)  26.6 - 33.0 pg Final    MCHC 07/02/2024 31.3 (L)  31.5 - 35.7 g/dL Final    RDW 07/02/2024 14.2  12.3 - 15.4 % Final    RDW-SD 07/02/2024 43.4  37.0 - 54.0 fl Final    MPV 07/02/2024 11.1  6.0 - 12.0 fL Final    Platelets 07/02/2024 209  140 - 450 10*3/mm3 Final    Neutrophil % 07/02/2024 72.1  42.7 - 76.0 % Final    Lymphocyte % 07/02/2024 14.8 (L)  19.6 - 45.3 % Final    Monocyte % 07/02/2024 7.9  5.0 - 12.0 % Final    Eosinophil % 07/02/2024 3.8  0.3 - 6.2 % Final    Basophil % 07/02/2024 0.7  0.0 - 1.5 % Final    Immature Grans % 07/02/2024 0.7 (H)  0.0 - 0.5 % Final    Neutrophils, Absolute 07/02/2024 5.38  1.70 - 7.00 10*3/mm3 Final    Lymphocytes, Absolute 07/02/2024 1.10  0.70 - 3.10 10*3/mm3 Final    Monocytes, Absolute 07/02/2024 0.59  0.10 - 0.90 10*3/mm3 Final    Eosinophils, Absolute 07/02/2024 0.28  0.00 - 0.40 10*3/mm3 Final    Basophils,  Absolute 07/02/2024 0.05  0.00 - 0.20 10*3/mm3 Final    Immature Grans, Absolute 07/02/2024 0.05  0.00 - 0.05 10*3/mm3 Final    nRBC 07/02/2024 0.0  0.0 - 0.2 /100 WBC Final   Lab on 06/13/2024   Component Date Value Ref Range Status    Glucose 06/13/2024 154 (H)  65 - 99 mg/dL Final    BUN 06/13/2024 22  8 - 23 mg/dL Final    Creatinine 06/13/2024 1.22 (H)  0.57 - 1.00 mg/dL Final    Sodium 06/13/2024 142  136 - 145 mmol/L Final    Potassium 06/13/2024 3.9  3.5 - 5.2 mmol/L Final    Chloride 06/13/2024 103  98 - 107 mmol/L Final    CO2 06/13/2024 27.8  22.0 - 29.0 mmol/L Final    Calcium 06/13/2024 9.2  8.6 - 10.5 mg/dL Final    Total Protein 06/13/2024 6.9  6.0 - 8.5 g/dL Final    Albumin 06/13/2024 3.8  3.5 - 5.2 g/dL Final    ALT (SGPT) 06/13/2024 9  1 - 33 U/L Final    AST (SGOT) 06/13/2024 12  1 - 32 U/L Final    Alkaline Phosphatase 06/13/2024 112  39 - 117 U/L Final    Total Bilirubin 06/13/2024 0.3  0.0 - 1.2 mg/dL Final    Globulin 06/13/2024 3.1  gm/dL Final    A/G Ratio 06/13/2024 1.2  g/dL Final    BUN/Creatinine Ratio 06/13/2024 18.0  7.0 - 25.0 Final    Anion Gap 06/13/2024 11.2  5.0 - 15.0 mmol/L Final    eGFR 06/13/2024 45.8 (L)  >60.0 mL/min/1.73 Final    WBC 06/13/2024 12.02 (H)  3.40 - 10.80 10*3/mm3 Final    RBC 06/13/2024 4.33  3.77 - 5.28 10*6/mm3 Final    Hemoglobin 06/13/2024 11.2 (L)  12.0 - 15.9 g/dL Final    Hematocrit 06/13/2024 36.7  34.0 - 46.6 % Final    MCV 06/13/2024 84.8  79.0 - 97.0 fL Final    MCH 06/13/2024 25.9 (L)  26.6 - 33.0 pg Final    MCHC 06/13/2024 30.5 (L)  31.5 - 35.7 g/dL Final    RDW 06/13/2024 12.9  12.3 - 15.4 % Final    RDW-SD 06/13/2024 39.4  37.0 - 54.0 fl Final    MPV 06/13/2024 10.6  6.0 - 12.0 fL Final    Platelets 06/13/2024 338  140 - 450 10*3/mm3 Final    Neutrophil % 06/13/2024 77.5 (H)  42.7 - 76.0 % Final    Lymphocyte % 06/13/2024 13.2 (L)  19.6 - 45.3 % Final    Monocyte % 06/13/2024 6.7  5.0 - 12.0 % Final    Eosinophil % 06/13/2024 0.4  0.3 - 6.2 %  Final    Basophil % 06/13/2024 0.5  0.0 - 1.5 % Final    Immature Grans % 06/13/2024 1.7 (H)  0.0 - 0.5 % Final    Neutrophils, Absolute 06/13/2024 9.30 (H)  1.70 - 7.00 10*3/mm3 Final    Lymphocytes, Absolute 06/13/2024 1.59  0.70 - 3.10 10*3/mm3 Final    Monocytes, Absolute 06/13/2024 0.81  0.10 - 0.90 10*3/mm3 Final    Eosinophils, Absolute 06/13/2024 0.05  0.00 - 0.40 10*3/mm3 Final    Basophils, Absolute 06/13/2024 0.06  0.00 - 0.20 10*3/mm3 Final    Immature Grans, Absolute 06/13/2024 0.21 (H)  0.00 - 0.05 10*3/mm3 Final    nRBC 06/13/2024 0.0  0.0 - 0.2 /100 WBC Final   Admission on 06/06/2024, Discharged on 06/06/2024   Component Date Value Ref Range Status    QT Interval 06/06/2024 376  ms Final    QTC Interval 06/06/2024 454  ms Final    Glucose 06/06/2024 110 (H)  65 - 99 mg/dL Final    BUN 06/06/2024 25 (H)  8 - 23 mg/dL Final    Creatinine 06/06/2024 1.16 (H)  0.57 - 1.00 mg/dL Final    Sodium 06/06/2024 138  136 - 145 mmol/L Final    Potassium 06/06/2024 4.0  3.5 - 5.2 mmol/L Final    Chloride 06/06/2024 100  98 - 107 mmol/L Final    CO2 06/06/2024 28.3  22.0 - 29.0 mmol/L Final    Calcium 06/06/2024 9.8  8.6 - 10.5 mg/dL Final    Total Protein 06/06/2024 7.6  6.0 - 8.5 g/dL Final    Albumin 06/06/2024 3.9  3.5 - 5.2 g/dL Final    ALT (SGPT) 06/06/2024 7  1 - 33 U/L Final    AST (SGOT) 06/06/2024 10  1 - 32 U/L Final    Alkaline Phosphatase 06/06/2024 111  39 - 117 U/L Final    Total Bilirubin 06/06/2024 0.4  0.0 - 1.2 mg/dL Final    Globulin 06/06/2024 3.7  gm/dL Final    A/G Ratio 06/06/2024 1.1  g/dL Final    BUN/Creatinine Ratio 06/06/2024 21.6  7.0 - 25.0 Final    Anion Gap 06/06/2024 9.7  5.0 - 15.0 mmol/L Final    eGFR 06/06/2024 48.7 (L)  >60.0 mL/min/1.73 Final    proBNP 06/06/2024 92.9  0.0 - 1,800.0 pg/mL Final    HS Troponin T 06/06/2024 18 (H)  <14 ng/L Final    Protime 06/06/2024 14.8 (H)  12.1 - 14.7 Seconds Final    INR 06/06/2024 1.15 (H)  0.90 - 1.10 Final    C-Reactive Protein  06/06/2024 14.43 (H)  0.00 - 0.50 mg/dL Final    Procalcitonin 06/06/2024 0.15  0.00 - 0.25 ng/mL Final    Lactate 06/06/2024 1.0  0.5 - 2.0 mmol/L Final    Respiratory Culture 06/06/2024 Scant growth (1+) Normal respiratory casey. No S. aureus or Pseudomonas aeruginosa detected. Final report.   Final    Gram Stain 06/06/2024 Rare (1+) Epithelial cells seen   Final    Gram Stain 06/06/2024 Many (4+) WBCs seen   Final    Gram Stain 06/06/2024 Few (2+) Mixed bacterial morphotypes seen on Gram Stain   Final    Blood Culture 06/06/2024 No growth at 5 days   Final    Blood Culture 06/06/2024 No growth at 5 days   Final    Extra Tube 06/06/2024 Hold for add-ons.   Final    Auto resulted.    Extra Tube 06/06/2024 hold for add-on   Final    Auto resulted    Extra Tube 06/06/2024 Hold for add-ons.   Final    Auto resulted.    Extra Tube 06/06/2024 Hold for add-ons.   Final    Auto resulted    WBC 06/06/2024 9.52  3.40 - 10.80 10*3/mm3 Final    RBC 06/06/2024 4.41  3.77 - 5.28 10*6/mm3 Final    Hemoglobin 06/06/2024 11.4 (L)  12.0 - 15.9 g/dL Final    Hematocrit 06/06/2024 37.7  34.0 - 46.6 % Final    MCV 06/06/2024 85.5  79.0 - 97.0 fL Final    MCH 06/06/2024 25.9 (L)  26.6 - 33.0 pg Final    MCHC 06/06/2024 30.2 (L)  31.5 - 35.7 g/dL Final    RDW 06/06/2024 13.1  12.3 - 15.4 % Final    RDW-SD 06/06/2024 40.7  37.0 - 54.0 fl Final    MPV 06/06/2024 10.5  6.0 - 12.0 fL Final    Platelets 06/06/2024 242  140 - 450 10*3/mm3 Final    Neutrophil % 06/06/2024 74.7  42.7 - 76.0 % Final    Lymphocyte % 06/06/2024 11.9 (L)  19.6 - 45.3 % Final    Monocyte % 06/06/2024 9.2  5.0 - 12.0 % Final    Eosinophil % 06/06/2024 2.6  0.3 - 6.2 % Final    Basophil % 06/06/2024 0.4  0.0 - 1.5 % Final    Immature Grans % 06/06/2024 1.2 (H)  0.0 - 0.5 % Final    Neutrophils, Absolute 06/06/2024 7.11 (H)  1.70 - 7.00 10*3/mm3 Final    Lymphocytes, Absolute 06/06/2024 1.13  0.70 - 3.10 10*3/mm3 Final    Monocytes, Absolute 06/06/2024 0.88  0.10 -  0.90 10*3/mm3 Final    Eosinophils, Absolute 06/06/2024 0.25  0.00 - 0.40 10*3/mm3 Final    Basophils, Absolute 06/06/2024 0.04  0.00 - 0.20 10*3/mm3 Final    Immature Grans, Absolute 06/06/2024 0.11 (H)  0.00 - 0.05 10*3/mm3 Final    nRBC 06/06/2024 0.0  0.0 - 0.2 /100 WBC Final    COVID19 06/06/2024 Not Detected  Not Detected - Ref. Range Final    Influenza A PCR 06/06/2024 Not Detected  Not Detected Final    Influenza B PCR 06/06/2024 Not Detected  Not Detected Final    Site 06/06/2024 Left Radial   Final    Gonzalo's Test 06/06/2024 Positive   Final    pH, Arterial 06/06/2024 7.406  7.350 - 7.450 pH units Final    pCO2, Arterial 06/06/2024 42.5  35.0 - 45.0 mm Hg Final    pO2, Arterial 06/06/2024 41.5 (C)  83.0 - 108.0 mm Hg Final    85 Value below critical limit    HCO3, Arterial 06/06/2024 26.7 (H)  20.0 - 26.0 mmol/L Final    83 Value above reference range    Base Excess, Arterial 06/06/2024 1.7  0.0 - 2.0 mmol/L Final    O2 Saturation, Arterial 06/06/2024 74.1 (L)  94.0 - 99.0 % Final    84 Value below reference range    Hemoglobin, Blood Gas 06/06/2024 11.9 (L)  13.5 - 17.5 g/dL Final    84 Value below reference range    Hematocrit, Blood Gas 06/06/2024 36.5 (L)  38.0 - 51.0 % Final    84 Value below reference range    Oxyhemoglobin 06/06/2024 72.6 (L)  94 - 99 % Final    84 Value below reference range    Methemoglobin 06/06/2024 0.20  0.00 - 3.00 % Final    Carboxyhemoglobin 06/06/2024 1.8  0 - 5 % Final    A-a DO2 06/06/2024 51.9  0.0 - 300.0 mmHg Final    CO2 Content 06/06/2024 28.0  22 - 33 mmol/L Final    Barometric Pressure for Blood Gas 06/06/2024 720  mmHg Final    Modality 06/06/2024 Room Air   Final    FIO2 06/06/2024 21  % Final    Ventilator Mode 06/06/2024 NA   Final    Notified Who 06/06/2024 DR NARANJO AND RN   Final    Notified By 06/06/2024 [id number]   Final    Notified Time 06/06/2024 06/06/2024 15:54   Final    Collected by 06/06/2024 [id number]   Final    Meter: [id number]     :   [id number]   Admission on 05/31/2024, Discharged on 06/01/2024   Component Date Value Ref Range Status    Glucose 05/31/2024 280 (H)  65 - 99 mg/dL Final    BUN 05/31/2024 14  8 - 23 mg/dL Final    Creatinine 05/31/2024 1.09 (H)  0.57 - 1.00 mg/dL Final    Sodium 05/31/2024 135 (L)  136 - 145 mmol/L Final    Potassium 05/31/2024 3.9  3.5 - 5.2 mmol/L Final    Slight hemolysis detected by analyzer. Result may be falsely elevated.    Chloride 05/31/2024 99  98 - 107 mmol/L Final    CO2 05/31/2024 23.5  22.0 - 29.0 mmol/L Final    Calcium 05/31/2024 9.2  8.6 - 10.5 mg/dL Final    Total Protein 05/31/2024 7.3  6.0 - 8.5 g/dL Final    Albumin 05/31/2024 3.9  3.5 - 5.2 g/dL Final    ALT (SGPT) 05/31/2024 12  1 - 33 U/L Final    AST (SGOT) 05/31/2024 14  1 - 32 U/L Final    Alkaline Phosphatase 05/31/2024 124 (H)  39 - 117 U/L Final    Total Bilirubin 05/31/2024 0.3  0.0 - 1.2 mg/dL Final    Globulin 05/31/2024 3.4  gm/dL Final    A/G Ratio 05/31/2024 1.1  g/dL Final    BUN/Creatinine Ratio 05/31/2024 12.8  7.0 - 25.0 Final    Anion Gap 05/31/2024 12.5  5.0 - 15.0 mmol/L Final    eGFR 05/31/2024 52.4 (L)  >60.0 mL/min/1.73 Final    Lactate 05/31/2024 2.6 (C)  0.5 - 2.0 mmol/L Final    Blood Culture 05/31/2024 No growth at 5 days   Final    Blood Culture 05/31/2024 No growth at 5 days   Final    QT Interval 05/31/2024 376  ms Final    QTC Interval 05/31/2024 494  ms Final    proBNP 05/31/2024 501.4  0.0 - 1,800.0 pg/mL Final    HS Troponin T 05/31/2024 18 (H)  <14 ng/L Final    WBC 05/31/2024 5.34  3.40 - 10.80 10*3/mm3 Final    RBC 05/31/2024 4.10  3.77 - 5.28 10*6/mm3 Final    Hemoglobin 05/31/2024 10.9 (L)  12.0 - 15.9 g/dL Final    Hematocrit 05/31/2024 35.5  34.0 - 46.6 % Final    MCV 05/31/2024 86.6  79.0 - 97.0 fL Final    MCH 05/31/2024 26.6  26.6 - 33.0 pg Final    MCHC 05/31/2024 30.7 (L)  31.5 - 35.7 g/dL Final    RDW 05/31/2024 13.0  12.3 - 15.4 % Final    RDW-SD 05/31/2024 40.7  37.0 - 54.0 fl Final    MPV  05/31/2024 10.7  6.0 - 12.0 fL Final    Platelets 05/31/2024 217  140 - 450 10*3/mm3 Final    Neutrophil % 05/31/2024 76.8 (H)  42.7 - 76.0 % Final    Lymphocyte % 05/31/2024 9.6 (L)  19.6 - 45.3 % Final    Monocyte % 05/31/2024 11.6  5.0 - 12.0 % Final    Eosinophil % 05/31/2024 0.9  0.3 - 6.2 % Final    Basophil % 05/31/2024 0.7  0.0 - 1.5 % Final    Immature Grans % 05/31/2024 0.4  0.0 - 0.5 % Final    Neutrophils, Absolute 05/31/2024 4.10  1.70 - 7.00 10*3/mm3 Final    Lymphocytes, Absolute 05/31/2024 0.51 (L)  0.70 - 3.10 10*3/mm3 Final    Monocytes, Absolute 05/31/2024 0.62  0.10 - 0.90 10*3/mm3 Final    Eosinophils, Absolute 05/31/2024 0.05  0.00 - 0.40 10*3/mm3 Final    Basophils, Absolute 05/31/2024 0.04  0.00 - 0.20 10*3/mm3 Final    Immature Grans, Absolute 05/31/2024 0.02  0.00 - 0.05 10*3/mm3 Final    nRBC 05/31/2024 0.0  0.0 - 0.2 /100 WBC Final    Extra Tube 05/31/2024 Hold for add-ons.   Final    Auto resulted.    Extra Tube 05/31/2024 hold for add-on   Final    Auto resulted    Extra Tube 05/31/2024 Hold for add-ons.   Final    Auto resulted.    Extra Tube 05/31/2024 Hold for add-ons.   Final    Auto resulted    COVID19 05/31/2024 Not Detected  Not Detected - Ref. Range Final    Influenza A PCR 05/31/2024 Not Detected  Not Detected Final    Influenza B PCR 05/31/2024 Not Detected  Not Detected Final    Color, UA 05/31/2024 Yellow  Yellow, Straw Final    Appearance, UA 05/31/2024 Clear  Clear Final    pH, UA 05/31/2024 6.5  5.0 - 8.0 Final    Specific Gravity, UA 05/31/2024 1.007  1.005 - 1.030 Final    Glucose, UA 05/31/2024 100 mg/dL (Trace) (A)  Negative Final    Ketones, UA 05/31/2024 Negative  Negative Final    Bilirubin, UA 05/31/2024 Negative  Negative Final    Blood, UA 05/31/2024 Negative  Negative Final    Protein, UA 05/31/2024 Negative  Negative Final    Leuk Esterase, UA 05/31/2024 Negative  Negative Final    Nitrite, UA 05/31/2024 Negative  Negative Final    Urobilinogen, UA 05/31/2024  0.2 E.U./dL  0.2 - 1.0 E.U./dL Final    Site 05/31/2024 Right Radial   Final    Gonzalo's Test 05/31/2024 Positive   Final    pH, Arterial 05/31/2024 7.362  7.350 - 7.450 pH units Final    pCO2, Arterial 05/31/2024 47.3 (H)  35.0 - 45.0 mm Hg Final    pO2, Arterial 05/31/2024 73.2 (L)  83.0 - 108.0 mm Hg Final    84 Value below reference range    HCO3, Arterial 05/31/2024 26.8 (H)  20.0 - 26.0 mmol/L Final    83 Value above reference range    Base Excess, Arterial 05/31/2024 0.9  0.0 - 2.0 mmol/L Final    O2 Saturation, Arterial 05/31/2024 94.7  94.0 - 99.0 % Final    Hemoglobin, Blood Gas 05/31/2024 11.0 (L)  13.5 - 17.5 g/dL Final    84 Value below reference range    Hematocrit, Blood Gas 05/31/2024 33.7 (L)  38.0 - 51.0 % Final    84 Value below reference range    Oxyhemoglobin 05/31/2024 92.9 (L)  94 - 99 % Final    84 Value below reference range    Methemoglobin 05/31/2024 0.50  0.00 - 3.00 % Final    Carboxyhemoglobin 05/31/2024 1.4  0 - 5 % Final    A-a DO2 05/31/2024 65.2  0.0 - 300.0 mmHg Final    CO2 Content 05/31/2024 28.2  22 - 33 mmol/L Final    Barometric Pressure for Blood Gas 05/31/2024 730  mmHg Final    Modality 05/31/2024 Nasal Cannula   Final    FIO2 05/31/2024 28  % Final    Flow Rate 05/31/2024 2.0  lpm Final    Ventilator Mode 05/31/2024 NA   Final    Collected by 05/31/2024 [id number]   Final    Meter: [id number]     :  [id number]    HS Troponin T 05/31/2024 16 (H)  <14 ng/L Final    Troponin T Delta 05/31/2024 -2  >=-4 - <+4 ng/L Final    Lactate 05/31/2024 2.1 (C)  0.5 - 2.0 mmol/L Final   Infusion on 05/30/2024   Component Date Value Ref Range Status    Glucose 05/30/2024 183 (H)  65 - 99 mg/dL Final    BUN 05/30/2024 17  8 - 23 mg/dL Final    Creatinine 05/30/2024 1.04 (H)  0.57 - 1.00 mg/dL Final    Sodium 05/30/2024 142  136 - 145 mmol/L Final    Potassium 05/30/2024 3.9  3.5 - 5.2 mmol/L Final    Slight hemolysis detected by analyzer. Result may be falsely elevated.    Chloride  05/30/2024 104  98 - 107 mmol/L Final    CO2 05/30/2024 26.7  22.0 - 29.0 mmol/L Final    Calcium 05/30/2024 9.0  8.6 - 10.5 mg/dL Final    Total Protein 05/30/2024 7.0  6.0 - 8.5 g/dL Final    Albumin 05/30/2024 3.7  3.5 - 5.2 g/dL Final    ALT (SGPT) 05/30/2024 12  1 - 33 U/L Final    AST (SGOT) 05/30/2024 12  1 - 32 U/L Final    Alkaline Phosphatase 05/30/2024 125 (H)  39 - 117 U/L Final    Total Bilirubin 05/30/2024 0.2  0.0 - 1.2 mg/dL Final    Globulin 05/30/2024 3.3  gm/dL Final    A/G Ratio 05/30/2024 1.1  g/dL Final    BUN/Creatinine Ratio 05/30/2024 16.3  7.0 - 25.0 Final    Anion Gap 05/30/2024 11.3  5.0 - 15.0 mmol/L Final    eGFR 05/30/2024 55.5 (L)  >60.0 mL/min/1.73 Final    TSH 05/30/2024 0.702  0.270 - 4.200 uIU/mL Final    Free T4 05/30/2024 0.99  0.93 - 1.70 ng/dL Final    WBC 05/30/2024 4.49  3.40 - 10.80 10*3/mm3 Final    RBC 05/30/2024 3.94  3.77 - 5.28 10*6/mm3 Final    Hemoglobin 05/30/2024 10.5 (L)  12.0 - 15.9 g/dL Final    Hematocrit 05/30/2024 33.8 (L)  34.0 - 46.6 % Final    MCV 05/30/2024 85.8  79.0 - 97.0 fL Final    MCH 05/30/2024 26.6  26.6 - 33.0 pg Final    MCHC 05/30/2024 31.1 (L)  31.5 - 35.7 g/dL Final    RDW 05/30/2024 12.8  12.3 - 15.4 % Final    RDW-SD 05/30/2024 39.8  37.0 - 54.0 fl Final    MPV 05/30/2024 10.3  6.0 - 12.0 fL Final    Platelets 05/30/2024 221  140 - 450 10*3/mm3 Final    Neutrophil % 05/30/2024 65.6  42.7 - 76.0 % Final    Lymphocyte % 05/30/2024 16.0 (L)  19.6 - 45.3 % Final    Monocyte % 05/30/2024 12.9 (H)  5.0 - 12.0 % Final    Eosinophil % 05/30/2024 4.0  0.3 - 6.2 % Final    Basophil % 05/30/2024 1.1  0.0 - 1.5 % Final    Immature Grans % 05/30/2024 0.4  0.0 - 0.5 % Final    Neutrophils, Absolute 05/30/2024 2.94  1.70 - 7.00 10*3/mm3 Final    Lymphocytes, Absolute 05/30/2024 0.72  0.70 - 3.10 10*3/mm3 Final    Monocytes, Absolute 05/30/2024 0.58  0.10 - 0.90 10*3/mm3 Final    Eosinophils, Absolute 05/30/2024 0.18  0.00 - 0.40 10*3/mm3 Final     Basophils, Absolute 05/30/2024 0.05  0.00 - 0.20 10*3/mm3 Final    Immature Grans, Absolute 05/30/2024 0.02  0.00 - 0.05 10*3/mm3 Final    nRBC 05/30/2024 0.0  0.0 - 0.2 /100 WBC Final   Infusion on 05/16/2024   Component Date Value Ref Range Status    Iron 05/16/2024 31 (L)  37 - 145 mcg/dL Final    Iron Saturation (TSAT) 05/16/2024 10 (L)  20 - 50 % Final    Transferrin 05/16/2024 199 (L)  200 - 360 mg/dL Final    TIBC 05/16/2024 297 (L)  298 - 536 mcg/dL Final    Ferritin 05/16/2024 250.70 (H)  13.00 - 150.00 ng/mL Final    Glucose 05/16/2024 133 (H)  65 - 99 mg/dL Final    BUN 05/16/2024 13  8 - 23 mg/dL Final    Creatinine 05/16/2024 0.95  0.57 - 1.00 mg/dL Final    Sodium 05/16/2024 140  136 - 145 mmol/L Final    Potassium 05/16/2024 4.0  3.5 - 5.2 mmol/L Final    Chloride 05/16/2024 100  98 - 107 mmol/L Final    CO2 05/16/2024 30.0 (H)  22.0 - 29.0 mmol/L Final    Calcium 05/16/2024 9.8  8.6 - 10.5 mg/dL Final    Total Protein 05/16/2024 7.8  6.0 - 8.5 g/dL Final    Albumin 05/16/2024 3.9  3.5 - 5.2 g/dL Final    ALT (SGPT) 05/16/2024 9  1 - 33 U/L Final    AST (SGOT) 05/16/2024 14  1 - 32 U/L Final    Alkaline Phosphatase 05/16/2024 121 (H)  39 - 117 U/L Final    Total Bilirubin 05/16/2024 0.3  0.0 - 1.2 mg/dL Final    Globulin 05/16/2024 3.9  gm/dL Final    A/G Ratio 05/16/2024 1.0  g/dL Final    BUN/Creatinine Ratio 05/16/2024 13.7  7.0 - 25.0 Final    Anion Gap 05/16/2024 10.0  5.0 - 15.0 mmol/L Final    eGFR 05/16/2024 61.8  >60.0 mL/min/1.73 Final    WBC 05/16/2024 6.69  3.40 - 10.80 10*3/mm3 Final    RBC 05/16/2024 4.13  3.77 - 5.28 10*6/mm3 Final    Hemoglobin 05/16/2024 10.9 (L)  12.0 - 15.9 g/dL Final    Hematocrit 05/16/2024 35.1  34.0 - 46.6 % Final    MCV 05/16/2024 85.0  79.0 - 97.0 fL Final    MCH 05/16/2024 26.4 (L)  26.6 - 33.0 pg Final    MCHC 05/16/2024 31.1 (L)  31.5 - 35.7 g/dL Final    RDW 05/16/2024 12.0 (L)  12.3 - 15.4 % Final    RDW-SD 05/16/2024 37.2  37.0 - 54.0 fl Final    MPV  05/16/2024 10.2  6.0 - 12.0 fL Final    Platelets 05/16/2024 302  140 - 450 10*3/mm3 Final    Neutrophil % 05/16/2024 67.7  42.7 - 76.0 % Final    Lymphocyte % 05/16/2024 17.2 (L)  19.6 - 45.3 % Final    Monocyte % 05/16/2024 9.6  5.0 - 12.0 % Final    Eosinophil % 05/16/2024 3.9  0.3 - 6.2 % Final    Basophil % 05/16/2024 0.6  0.0 - 1.5 % Final    Immature Grans % 05/16/2024 1.0 (H)  0.0 - 0.5 % Final    Neutrophils, Absolute 05/16/2024 4.53  1.70 - 7.00 10*3/mm3 Final    Lymphocytes, Absolute 05/16/2024 1.15  0.70 - 3.10 10*3/mm3 Final    Monocytes, Absolute 05/16/2024 0.64  0.10 - 0.90 10*3/mm3 Final    Eosinophils, Absolute 05/16/2024 0.26  0.00 - 0.40 10*3/mm3 Final    Basophils, Absolute 05/16/2024 0.04  0.00 - 0.20 10*3/mm3 Final    Immature Grans, Absolute 05/16/2024 0.07 (H)  0.00 - 0.05 10*3/mm3 Final    nRBC 05/16/2024 0.0  0.0 - 0.2 /100 WBC Final   Infusion on 05/02/2024   Component Date Value Ref Range Status    Glucose 05/02/2024 140 (H)  65 - 99 mg/dL Final    BUN 05/02/2024 14  8 - 23 mg/dL Final    Creatinine 05/02/2024 0.84  0.57 - 1.00 mg/dL Final    Sodium 05/02/2024 142  136 - 145 mmol/L Final    Potassium 05/02/2024 3.7  3.5 - 5.2 mmol/L Final    Chloride 05/02/2024 104  98 - 107 mmol/L Final    CO2 05/02/2024 29.1 (H)  22.0 - 29.0 mmol/L Final    Calcium 05/02/2024 9.1  8.6 - 10.5 mg/dL Final    Total Protein 05/02/2024 7.2  6.0 - 8.5 g/dL Final    Albumin 05/02/2024 3.9  3.5 - 5.2 g/dL Final    ALT (SGPT) 05/02/2024 9  1 - 33 U/L Final    AST (SGOT) 05/02/2024 13  1 - 32 U/L Final    Alkaline Phosphatase 05/02/2024 149 (H)  39 - 117 U/L Final    Total Bilirubin 05/02/2024 0.2  0.0 - 1.2 mg/dL Final    Globulin 05/02/2024 3.3  gm/dL Final    A/G Ratio 05/02/2024 1.2  g/dL Final    BUN/Creatinine Ratio 05/02/2024 16.7  7.0 - 25.0 Final    Anion Gap 05/02/2024 8.9  5.0 - 15.0 mmol/L Final    eGFR 05/02/2024 71.7  >60.0 mL/min/1.73 Final    WBC 05/02/2024 6.53  3.40 - 10.80 10*3/mm3 Final    RBC  05/02/2024 3.97  3.77 - 5.28 10*6/mm3 Final    Hemoglobin 05/02/2024 10.9 (L)  12.0 - 15.9 g/dL Final    Hematocrit 05/02/2024 34.6  34.0 - 46.6 % Final    MCV 05/02/2024 87.2  79.0 - 97.0 fL Final    MCH 05/02/2024 27.5  26.6 - 33.0 pg Final    MCHC 05/02/2024 31.5  31.5 - 35.7 g/dL Final    RDW 05/02/2024 11.9 (L)  12.3 - 15.4 % Final    RDW-SD 05/02/2024 38.3  37.0 - 54.0 fl Final    MPV 05/02/2024 10.2  6.0 - 12.0 fL Final    Platelets 05/02/2024 244  140 - 450 10*3/mm3 Final    Neutrophil % 05/02/2024 69.3  42.7 - 76.0 % Final    Lymphocyte % 05/02/2024 15.5 (L)  19.6 - 45.3 % Final    Monocyte % 05/02/2024 8.9  5.0 - 12.0 % Final    Eosinophil % 05/02/2024 4.9  0.3 - 6.2 % Final    Basophil % 05/02/2024 0.9  0.0 - 1.5 % Final    Immature Grans % 05/02/2024 0.5  0.0 - 0.5 % Final    Neutrophils, Absolute 05/02/2024 4.53  1.70 - 7.00 10*3/mm3 Final    Lymphocytes, Absolute 05/02/2024 1.01  0.70 - 3.10 10*3/mm3 Final    Monocytes, Absolute 05/02/2024 0.58  0.10 - 0.90 10*3/mm3 Final    Eosinophils, Absolute 05/02/2024 0.32  0.00 - 0.40 10*3/mm3 Final    Basophils, Absolute 05/02/2024 0.06  0.00 - 0.20 10*3/mm3 Final    Immature Grans, Absolute 05/02/2024 0.03  0.00 - 0.05 10*3/mm3 Final    nRBC 05/02/2024 0.0  0.0 - 0.2 /100 WBC Final   Infusion on 04/18/2024   Component Date Value Ref Range Status    Glucose 04/18/2024 182 (H)  65 - 99 mg/dL Final    BUN 04/18/2024 13  8 - 23 mg/dL Final    Creatinine 04/18/2024 0.91  0.57 - 1.00 mg/dL Final    Sodium 04/18/2024 140  136 - 145 mmol/L Final    Potassium 04/18/2024 3.8  3.5 - 5.2 mmol/L Final    Chloride 04/18/2024 104  98 - 107 mmol/L Final    CO2 04/18/2024 27.3  22.0 - 29.0 mmol/L Final    Calcium 04/18/2024 9.2  8.6 - 10.5 mg/dL Final    Total Protein 04/18/2024 7.3  6.0 - 8.5 g/dL Final    Albumin 04/18/2024 3.9  3.5 - 5.2 g/dL Final    ALT (SGPT) 04/18/2024 8  1 - 33 U/L Final    AST (SGOT) 04/18/2024 12  1 - 32 U/L Final    Alkaline Phosphatase 04/18/2024  157 (H)  39 - 117 U/L Final    Total Bilirubin 04/18/2024 0.3  0.0 - 1.2 mg/dL Final    Globulin 04/18/2024 3.4  gm/dL Final    A/G Ratio 04/18/2024 1.1  g/dL Final    BUN/Creatinine Ratio 04/18/2024 14.3  7.0 - 25.0 Final    Anion Gap 04/18/2024 8.7  5.0 - 15.0 mmol/L Final    eGFR 04/18/2024 65.1  >60.0 mL/min/1.73 Final    TSH 04/18/2024 0.542  0.270 - 4.200 uIU/mL Final    Free T4 04/18/2024 1.29  0.93 - 1.70 ng/dL Final    WBC 04/18/2024 7.00  3.40 - 10.80 10*3/mm3 Final    RBC 04/18/2024 3.91  3.77 - 5.28 10*6/mm3 Final    Hemoglobin 04/18/2024 10.7 (L)  12.0 - 15.9 g/dL Final    Hematocrit 04/18/2024 34.5  34.0 - 46.6 % Final    MCV 04/18/2024 88.2  79.0 - 97.0 fL Final    MCH 04/18/2024 27.4  26.6 - 33.0 pg Final    MCHC 04/18/2024 31.0 (L)  31.5 - 35.7 g/dL Final    RDW 04/18/2024 12.0 (L)  12.3 - 15.4 % Final    RDW-SD 04/18/2024 38.5  37.0 - 54.0 fl Final    MPV 04/18/2024 10.7  6.0 - 12.0 fL Final    Platelets 04/18/2024 254  140 - 450 10*3/mm3 Final    Neutrophil % 04/18/2024 70.3  42.7 - 76.0 % Final    Lymphocyte % 04/18/2024 15.1 (L)  19.6 - 45.3 % Final    Monocyte % 04/18/2024 8.0  5.0 - 12.0 % Final    Eosinophil % 04/18/2024 5.3  0.3 - 6.2 % Final    Basophil % 04/18/2024 0.9  0.0 - 1.5 % Final    Immature Grans % 04/18/2024 0.4  0.0 - 0.5 % Final    Neutrophils, Absolute 04/18/2024 4.92  1.70 - 7.00 10*3/mm3 Final    Lymphocytes, Absolute 04/18/2024 1.06  0.70 - 3.10 10*3/mm3 Final    Monocytes, Absolute 04/18/2024 0.56  0.10 - 0.90 10*3/mm3 Final    Eosinophils, Absolute 04/18/2024 0.37  0.00 - 0.40 10*3/mm3 Final    Basophils, Absolute 04/18/2024 0.06  0.00 - 0.20 10*3/mm3 Final    Immature Grans, Absolute 04/18/2024 0.03  0.00 - 0.05 10*3/mm3 Final    nRBC 04/18/2024 0.0  0.0 - 0.2 /100 WBC Final   Hospital Outpatient Visit on 04/10/2024   Component Date Value Ref Range Status    LVIDd 04/10/2024 5.1  cm Final    LVIDs 04/10/2024 4.1  cm Final    IVSd 04/10/2024 1.00  cm Final    LVPWd  04/10/2024 1.10  cm Final    FS 04/10/2024 19.6  % Final    IVS/LVPW 04/10/2024 0.91  cm Final    ESV(cubed) 04/10/2024 68.9  ml Final    LV Sys Vol (BSA corrected) 04/10/2024 13.2  cm2 Final    EDV(cubed) 04/10/2024 132.7  ml Final    LV Hammonds Vol (BSA corrected) 04/10/2024 28.0  cm2 Final    LV mass(C)d 04/10/2024 200.8  grams Final    LVOT area 04/10/2024 3.1  cm2 Final    LVOT diam 04/10/2024 2.00  cm Final    EDV(MOD-sp4) 04/10/2024 56.8  ml Final    ESV(MOD-sp4) 04/10/2024 26.8  ml Final    SV(MOD-sp4) 04/10/2024 30.0  ml Final    SVi(MOD-SP4) 04/10/2024 14.8  ml/m2 Final    EF(MOD-sp4) 04/10/2024 52.8  % Final    MV E max og 04/10/2024 70.3  cm/sec Final    LA ESV Index (BP) 04/10/2024 29.1  ml/m2 Final    Med Peak E' Og 04/10/2024 6.0  cm/sec Final    Lat Peak E' Og 04/10/2024 6.7  cm/sec Final    TR max og 04/10/2024 257.0  cm/sec Final    Avg E/e' ratio 04/10/2024 11.07   Final    TAPSE (>1.6) 04/10/2024 1.99  cm Final    LA dimension (2D)  04/10/2024 4.6  cm Final    Ao pk og 04/10/2024 145.0  cm/sec Final    Ao max PG 04/10/2024 8.4  mmHg Final    Ao mean PG 04/10/2024 4.0  mmHg Final    Ao V2 VTI 04/10/2024 36.1  cm Final    TR max PG 04/10/2024 26.4  mmHg Final    RVSP(TR) 04/10/2024 36.4  mmHg Final    RAP systole 04/10/2024 10.0  mmHg Final    PA acc time 04/10/2024 0.10  sec Final    Ao root diam 04/10/2024 3.6  cm Final    ACS 04/10/2024 2.40  cm Final    EF(MOD-bp) 04/10/2024 52.0  % Final        Imaging Results (Most Recent)       None            Radiology Review:   Imaging Results (Last 72 Hours)       ** No results found for the last 72 hours. **          Reviewed chest XR from 06/06/2024     Narrative & Impression   XR CHEST AP-     CLINICAL INDICATION: CHF/COPD Protocol        COMPARISON: 5/31/2024     TECHNIQUE: Single frontal view of the chest.     FINDINGS:     LUNGS: Right upper lobe and left basilar airspace disease     HEART AND MEDIASTINUM: Heart and mediastinal contours are  unremarkable        SKELETON: No acute bony abnormality           IMPRESSION:  Right upper lobe and left basilar airspace disease           This report was finalized on 6/6/2024 3:51 PM by Dr. Mauricio Santiago MD.        Reviewed spirometry from 02/12/2020        Assessment / Plan         Assessment   Diagnoses and all orders for this visit:    1. Chronic obstructive pulmonary disease, unspecified COPD type (Primary)  Will plan to obtain spirometry at next visit.   Screened for alpha 1 antitrypsin deficiency during visit.   Continue albuterol inhaler.   Continue DuoNeb treatments as needed.   Breztri inhaler hasn't been filled since May 2024. Pharmacy contacted daughter and reports that she hasn't been taking at home. Will restart inhaler and advised on importance of compliance with using 2 puffs BID.     -     6 Minute Walk Test; Future    2. Chronic respiratory failure with hypoxia  Has supplemental oxygen supplies (stationary oxygen concentrator and large tanks) which she utilizes as needed. Reports that she doesn't utilize at night but only during the day as needed.   Recommended that patient utilize supplemental oxygen at night and as needed.   Would like portable oxygen concentrator for easier portability. Previously had portable oxygen tanks but states that they were too heavy so turned back into [employer]. Noted to meet requirement of using 8 tanks for 3 consecutive months to qualify for POC. Completed 6-MWT during visit and oxygen saturations dropped to 79% during testing on room air. Will send POC order to [facility] to see if this would help patient qualify for POC.     -     Oxygen Therapy    3. Former smoker  4. Non-small cell cancer of right lung  Mayra Hays  reports that she quit smoking about 9 years ago. Her smoking use included cigarettes. She started smoking about 26 years ago. She has a 50.2 pack-year smoking history. She has been exposed to tobacco smoke. She has never used smokeless  tobacco.   Follows closely with oncology. Completed radiation and remains on maintenance chemotherapy with Durvalumab.   Completes routine imaging per oncology recommendations.     5. History of recent pneumonia  Recent ED visit in June 2024 and diagnosed with bilateral pneumonia. Discharged home with Levaquin, Doxycycline, steroids, and mucolytic's. Reports that symptoms have improved.   Will plan to obtain chest XR at next follow-up visit to ensure resolution of opacities.     Medications Discontinued During This Encounter   Medication Reason    insulin glargine (LANTUS, SEMGLEE) 100 UNIT/ML injection Patient Reported Not Taking    Budeson-Glycopyrrol-Formoterol (Breztri Aerosphere) 160-9-4.8 MCG/ACT aerosol inhaler Reorder        New Medications Ordered This Visit   Medications    Budeson-Glycopyrrol-Formoterol (Breztri Aerosphere) 160-9-4.8 MCG/ACT aerosol inhaler     Sig: Inhale 2 puffs 2 (Two) Times a Day.     Dispense:  10.7 g     Refill:  6     Note from CoverMyMeds suggested to bill under Medicare Part D (covered)     I spent >/=40 minutes caring for Mayra on this date of service. This time includes time spent by me in the following activities:preparing for the visit, reviewing tests, obtaining and/or reviewing a separately obtained history, performing a medically appropriate examination and/or evaluation , counseling and educating the patient/family/caregiver, ordering medications, tests, or procedures, referring and communicating with other health care professionals , documenting information in the medical record, independently interpreting results and communicating that information with the patient/family/caregiver, and care coordination    Follow Up   Return in about 2 weeks (around 7/22/2024), or if symptoms worsen or fail to improve, for Next scheduled follow up.    Patient was given instructions and counseling regarding her condition or for health maintenance advice. Please see specific information  pulled into the AVS if appropriate.       This document has been electronically signed by Jocelyn Mendoza PA-C   July 8, 2024 11:22 EDT    Dictated Utilizing Dragon Dictation: Part of this note may be an electronic transcription/translation of spoken language to printed text using the Dragon Dictation System.

## 2024-07-10 DIAGNOSIS — K90.49 MALABSORPTION DUE TO INTOLERANCE, NOT ELSEWHERE CLASSIFIED: ICD-10-CM

## 2024-07-10 DIAGNOSIS — C34.91 NON-SMALL CELL CANCER OF RIGHT LUNG: ICD-10-CM

## 2024-07-10 DIAGNOSIS — D50.9 IRON DEFICIENCY ANEMIA, UNSPECIFIED IRON DEFICIENCY ANEMIA TYPE: ICD-10-CM

## 2024-07-10 DIAGNOSIS — D64.9 ACUTE ANEMIA: ICD-10-CM

## 2024-07-10 DIAGNOSIS — C34.11 PRIMARY CANCER OF RIGHT UPPER LOBE OF LUNG: Primary | ICD-10-CM

## 2024-07-17 NOTE — PROGRESS NOTES
Subjective     Date: 2024    Referring Provider  No ref. provider found    Chief Complaint  Symptomatic anemia   2. Non small Cell Lung Cancer  Cancer Staging   Stage IIIA (cT1c, cN2, cM0)        Subjective      Mayra Hays is a 78 y.o. female who presents today to [facility] for follow up.    HPI:   78 y.o. female with past medical history of diabetes mellitus type 2, hyperlipidemia, COPD, hypertension, atrial fibrillation on anticoagulation (Eliquis), h/o CVA and previous tobacco use presents for symptomatic anemia and NSCLC.    She is present today with her daughter, Hailey, who is assisting with history. Pt started work up for RUL lung nodule , since then noticed to have a decrease in her Hgb to 10.7 from normal in 2023. More recently, her Hgb 8/9 was 6.5, she was directed to ED where she received 1U PRBC.     Patient reports increased fatigue over the last several months. She reports no bleeding, however does report dark colored stool (melena) two days ago. Denies any other evidence of melena or hematochezia.     She has previously been diagnosed with iron deficiency (years ago) requiring oral iron supplements, but never received IV iron or bone marrow biopsy.    She and her daughter are unsure of her last colonoscopy and endoscopy history, think it may have occurred >5 years ago but unsure.    She denies recent weight loss, fever, chills,  night sweats.  Previous smoker, quit 5-6 years ago, smoked 3 packs/day X 30 years. Denies alcohol or drug use. Family history significant for son with leukemia, brothers with lung cancer.     Oncology History:  2023: low dose CT chest screenin.9 cm spiculated right upper lobe pulmonary nodule concerning for malignancy, PET/CT recommended.   2023: EBUS by Dr. Peter negative for malignancy for bronchial washing and FNA of station 10R  2023: PET/CT: Right upper lobe pulmonary nodule, more  posterior possible satellite nodules are postobstructive process measuring 2.1 cm with mSUV 14.5. Also with pretracheal region lymph node 2.6 cm.   6/29/2023: CT guided needle biopsy was non diagnostic, showed fibrosis and chronic inflammation.   8/9/2023: Lab work showed anemia with Hgb 6.5. Referred to ED. Bronchoscopy deferred.   9/6/2023: Navigational bronchoscopy performed by Dr. Clemens- Right upper lobe lung transbronchial biopsy revealed squamous cell carcinoma, lymph node at station 4R also involved with squamous cell carcinoma. PD-L1 TPS 60%. Patient not deemed a surgical candidate.  9/13-11/21/2023: Received weekly carboplatin  X 7 and radiation. Patient had a reaction to paclitaxel, it was omitted.   12/19/2023: Post treatment CT chest with improvement of masslike process RUL now 1.6 cm, suggest significant response to treatment. Mediastinal lymph nodes are now within normal limits of size.     Ms. Hays presents today with her daughter, grand daughter, and great grand daughters. She is in a wheelchair. She lives alone with her grand son, able to perform her ADLs including cooking, cleaning.     Treatment history:        2.       Interval History 10/11/2023   Ms. Hays presents for follow up today, accompanied by her daughter in law. She started treatment with paclitaxel and carboplatin on 10/9, unfortunately had a anaphylactic reaction to taxol after 8 minutes of infusion causing her to go to the ED. Patient was awake and feeling back to herself at the time of ED visit. Carboplatin was not administered.     She reports doing well overall, no new symptoms. Started radiation therapy yesterday.      Interval History 10/24/2023   Ms. Hays presents for cycle 2 of carboplatin with concurrent radiation. Did well with first cycle. Denies any adverse effects including nausea, vomiting, diarrhea, neuropathy. Appetite is good. Radiation is going well. No complaints today.  Denies any skin rash or  bites.    Interval History 11/21/2023   Ms. Hays presents today for cycle 7 of carboplatin with concurrent radiation. She reports good tolerance thus far without neuropathy, nausea, vomiting, diarrhea. Appetite is stable.   Denies any GIB.    Interval History 01/11/2024   Ms. Hays presents after completion of therapy. She was admitted to the hospital 12/31 due to shortness of breath, found to have Afib with RVR and coronavirus HKU. She received inpatient antibiotics and steroids. Currently with improvement, still reports some fatigue.   We reviewed her last CT chest, which shows improvement after treatment. Discussed continuing immunotherapy.      Interval History 02/22/2024   Ms. Hays presents today for follow, prior to cycle 3 of maintenance durvalumab. Accompanied by her grand daughter's fiance. She reports improved energy. Denies any bleeding, nausea, vomiting, diarrhea, rash. On examination, noted to have petechia on bilateral distal lower extremities and R distal upper extremities, she believes these started today. Denies pruritus.     Interval History 03/21/2024    presents prior to C5 of consolidation therapy with durvalumab. She reports good tolerance to the last treatment, denies shortness of breath, diarrhea, nausea/vomiting, fatigue. She's been applying lotion to her legs, petechia has decreased, primarily just on her left leg now.     Interval History 04/18/2024   Ms. Hays presents prior to C7 of durvalumab. Reports good tolerance without nausea, vomiting, diarrhea. No changes in petechia. Denies any other complaints.    Interval History 06/13/2024   Ms. Hays presents for follow up prior to cycle 11 of durvalumab. She presented to ED 6/6 due to shortness of breath and cough. CXR showed RUL and left basilar airspace disease for which she received levofloxacin. Today with improvement in symptoms. Denies diarrhea, nausea, vomiting.     Interval History 07/18/2024   Ms. Hays presents for  follow-up, prior to cycle 13 of consolidation Durvalumab. She reports good tolerance, denies any cough/shortness of breath, NVD. Continues to have non pruritic petechia on her legs.     Objective     Objective     Allergy:   Allergies   Allergen Reactions    Paclitaxel Anaphylaxis        Current Medications:   Current Outpatient Medications   Medication Sig Dispense Refill    albuterol sulfate  (90 Base) MCG/ACT inhaler Inhale 2 puffs Every 4 (Four) Hours As Needed for Wheezing. 18 g 5    apixaban (ELIQUIS) 5 MG tablet tablet Take 1 tablet by mouth Every 12 (Twelve) Hours. 180 tablet 3    Budeson-Glycopyrrol-Formoterol (Breztri Aerosphere) 160-9-4.8 MCG/ACT aerosol inhaler Inhale 2 puffs 2 (Two) Times a Day. 10.7 g 6    hydrOXYzine pamoate (VISTARIL) 25 MG capsule Take 1 capsule by mouth Every Night.      ipratropium-albuterol (DUO-NEB) 0.5-2.5 mg/3 ml nebulizer USE 1 VIAL IN NEBULIZER 4 TIMES DAILY - and as needed 320 mL 11    lidocaine-prilocaine (EMLA) 2.5-2.5 % cream Apply to port-a-cath site 30 minutes prior to arrival at infusion center. Cover with plastic wrap. 30 g 1    lisinopril (PRINIVIL,ZESTRIL) 5 MG tablet Take 1 tablet by mouth Daily. 90 tablet 3    metFORMIN (GLUCOPHAGE) 500 MG tablet Take 1 tablet by mouth 2 (Two) Times a Day As Needed (only takes if blood glucose is above 200). As needed      prochlorperazine (COMPAZINE) 10 MG tablet Take 1 tablet by mouth Every 6 (Six) Hours As Needed for Nausea or Vomiting. 30 tablet 1    spironolactone (ALDACTONE) 25 MG tablet Take 1 tablet by mouth Daily.       No current facility-administered medications for this visit.     Facility-Administered Medications Ordered in Other Visits   Medication Dose Route Frequency Provider Last Rate Last Admin    heparin injection 500 Units  500 Units Intravenous PRN Bailee Gan APRN   500 Units at 01/26/24 1558    heparin injection 500 Units  500 Units Intravenous PRN Bailee Gan APRN   500 Units at  07/18/24 1554    sodium chloride 0.9 % flush 10 mL  10 mL Intravenous PRN Gilbert, Bailee Sharmaine, APRN        sodium chloride 0.9 % flush 10 mL  10 mL Intravenous PRN Gilbert, Bailee Sharmaine, APRN   10 mL at 07/18/24 1554       Past Medical History:  Past Medical History:   Diagnosis Date    Arthritis     CHF (congestive heart failure)     Chronic anticoagulation     Eliquis    Chronic kidney disease     stage II/IIIa    Collapsed lung     COPD (chronic obstructive pulmonary disease)     Diabetes mellitus     TYPE 2    Elevated cholesterol     GERD (gastroesophageal reflux disease)     Hyperlipidemia     Hypertension     Non-small cell lung cancer RUL     Sleep apnea     non compliant with CPAP    Stroke 2019    Wears eyeglasses        Past Surgical History:  Past Surgical History:   Procedure Laterality Date    BRONCHOSCOPY Bilateral 02/27/2023    Procedure: BRONCHOSCOPY WITH ENDOBRONCHIAL ULTRASOUND;  Surgeon: Abdulkadir Peter MD;  Location: [facility] OR;  Service: Pulmonary;  Laterality: Bilateral;    BRONCHOSCOPY WITH ION ROBOTIC ASSIST N/A 9/6/2023    Procedure: BRONCHOSCOPY WITH ION ROBOT AND EBUS;  Surgeon: John Clemens MD;  Location:  [facility];  Service: Robotics - Pulmonary;  Laterality: N/A;  Ion cath #6 - 0032,  #6 - 0030, cath guide # 0077. EBUS scope removed with balloon intact.    CARDIAC CATHETERIZATION N/A 08/22/2017    Procedure: Left Heart Cath;  Surgeon: Jatinder Matias MD;  Location: [facility] CATH INVASIVE LOCATION;  Service:     CARDIAC CATHETERIZATION N/A 11/22/2021    Procedure: Left Heart Cath;  Surgeon: Tolu Steinberg MD;  Location: [facility] CATH INVASIVE LOCATION;  Service: Cardiology;  Laterality: N/A;    COLONOSCOPY      ENDOSCOPY      GALLBLADDER SURGERY      PORTACATH PLACEMENT N/A 9/29/2023    Procedure: INSERTION OF PORTACATH;  Surgeon: Petr Velásquez MD;  Location:  [facility] OR;  Service: General;  Laterality: N/A;    VENTRAL HERNIA REPAIR N/A 05/14/2020    Procedure:  VENTRAL HERNIA REPAIR LAPAROSCOPIC WITH DAVINCI ROBOT;  Surgeon: Petr Velásquez MD;  Location: [facility];  Service: DaVinci;  Laterality: N/A;       Social History:  Social History     Socioeconomic History    Marital status: Single    Number of children: 6   Tobacco Use    Smoking status: Former     Current packs/day: 0.00     Average packs/day: 3.0 packs/day for 16.7 years (50.2 ttl pk-yrs)     Types: Cigarettes     Start date: 4/3/1998     Quit date: 2015     Years since quittin.5     Passive exposure: Past    Smokeless tobacco: Never   Vaping Use    Vaping status: Never Used   Substance and Sexual Activity    Alcohol use: No    Drug use: No    Sexual activity: Never         Family History:  Family History   Problem Relation Age of Onset    Heart disease Mother         Rhianna sophia    Heart disease Father     Heart attack Father     Heart failure Father        Review of Systems:  Review of Systems   Constitutional:  Positive for fatigue.   All other systems reviewed and are negative.      Vital Signs:   There were no vitals taken for this visit.     Physical Exam:  Physical Exam  Vitals reviewed.   Constitutional:       General: She is not in acute distress.     Appearance: Normal appearance. She is obese. She is not ill-appearing.      Comments: +hard of hearing   HENT:      Head: Normocephalic and atraumatic.      Mouth/Throat:      Mouth: Mucous membranes are moist.      Pharynx: Oropharynx is clear.   Eyes:      Conjunctiva/sclera: Conjunctivae normal.      Pupils: Pupils are equal, round, and reactive to light.   Cardiovascular:      Rate and Rhythm: Normal rate and regular rhythm.      Heart sounds: No murmur heard.  Pulmonary:      Effort: Pulmonary effort is normal. No respiratory distress.      Breath sounds: Wheezing present.   Chest:      Comments: +R chest port  Abdominal:      General: Abdomen is flat. Bowel sounds are normal. There is no distension.      Palpations: Abdomen is soft.  There is no mass.      Tenderness: There is no abdominal tenderness. There is no guarding.   Musculoskeletal:         General: No swelling. Normal range of motion.      Cervical back: Normal range of motion.   Lymphadenopathy:      Cervical: No cervical adenopathy.   Skin:     General: Skin is warm and dry.      Findings: Petechiae present.      Comments: LLE    Neurological:      General: No focal deficit present.      Mental Status: She is alert and oriented to person, place, and time. Mental status is at baseline.   Psychiatric:         Mood and Affect: Mood normal.         PHQ-9 Score  PHQ-9 Total Score:       Pain Score  There were no vitals filed for this visit.                                PAINSCOREQUALITYMETRIC:   There were no vitals filed for this visit.                       Lab Review  Lab Results   Component Value Date    WBC 7.49 07/18/2024    HGB 11.3 (L) 07/18/2024    HCT 36.5 07/18/2024    MCV 84.9 07/18/2024    RDW 14.4 07/18/2024     07/18/2024    NEUTRORELPCT 72.3 07/18/2024    LYMPHORELPCT 14.7 (L) 07/18/2024    MONORELPCT 8.8 07/18/2024    EOSRELPCT 3.1 07/18/2024    BASORELPCT 0.7 07/18/2024    NEUTROABS 5.42 07/18/2024    LYMPHSABS 1.10 07/18/2024     Lab Results   Component Value Date     07/18/2024    K 4.7 07/18/2024    CO2 24.8 07/18/2024     (H) 07/18/2024    BUN 14 07/18/2024    CREATININE 0.85 07/18/2024    EGFRIFNONA 51 (L) 11/22/2021    GLUCOSE 101 (H) 07/18/2024    CALCIUM 8.9 07/18/2024    ALKPHOS 115 07/18/2024    AST 19 07/18/2024    ALT 11 07/18/2024    BILITOT 0.3 07/18/2024    ALBUMIN 4.1 07/18/2024    PROTEINTOT 6.9 07/18/2024    MG 2.2 01/03/2024    PHOS 3.5 05/18/2020     Lab Results   Component Value Date    RETICCTPCT 4.26 (H) 08/16/2023     Lab Results   Component Value Date    FERRITIN 250.70 (H) 05/16/2024    IRON 31 (L) 05/16/2024    TIBC 297 (L) 05/16/2024    LABIRON 10 (L) 05/16/2024    RIMCGLMG93 548 08/14/2023    FOLATE 12.90 08/14/2023         Radiology Results    CT Chest With Contrast Diagnostic (05/01/2024 09:01)     IMPRESSION:  1. Development of airspace disease in the right upper lobe which is in  the region of a previously noted spiculated nodule likely reflecting  posttreatment related change.  2. Interval improvement in the previously noted bilateral lower lobe  airspace disease with a few patchy opacities remaining.    CT Chest Without Contrast Diagnostic (12/31/2023 20:30)   IMPRESSION:  Spiculated nodule of the right upper lobe concerning for neoplasm.  Masslike consolidation in the right lower lobe could represent pneumonia  with underlying mass not excluded. Atelectasis/airspace disease in the  left lower lobe and lingula. Mediastinal adenopathy. Follow-up  recommended.    CT Chest With Contrast Diagnostic (12/19/2023 14:47)     IMPRESSION:  1.  Significant decrease size and masslike appearance of process in the  right upper lobe which would suggest significant response to treatment.  2.  No suspicious pulmonary nodules or masses identified.  3.  Mediastinal lymph nodes are now within normal limits for size.  No  hilar or mediastinal adenopathy.  4.  Mild centrilobular nodularity of the left lower lobe which may  reflect changes of atypical pneumonia or aspiration pneumonitis.  5.  Stable cardiomegaly.  6.  Other incidental/nonacute findings above.      CT Angiogram Chest Pulmonary Embolism (10/09/2023 16:41)   MPRESSION:  1.  Right upper lobe mass in association with pneumonia has increased in  size and extent when compared to the previous exam with extension to the  pleural surface. Masslike component is approximately 56.8 x 44.5 mm and  was previously 40.1 x 36.7 mm. This would correspond to primary  malignancy.  2.  Central bronchial wall thickening. Can be seen with reactive airway  disease or bronchitis.  3.  1.3 cm left adrenal nodule is noted. No significant change from  previous.  4.  Increased size of paratracheal and  mediastinal lymph nodes. A right  paratracheal lymph node is 2.5 cm and was previously 2.2 cm.  5. No PE identified.  6. Marked cardiomegaly, stable.  7. Otherwise stable chest with other nonacute/incidental findings as  above.    CT Head Without Contrast (10/09/2023 12:00)   IMPRESSION:    Unremarkable exam demonstrating no CT evidence of acute intracranial  findings.      CT Chest Without Contrast Diagnostic (08/09/2023 12:38)       NM PET/CT Skull Base to Mid Thigh (06/20/2023 11:12)         CT Chest Low Dose Cancer Screening WO (02/14/2023 12:58)   IMPRESSION:    Interval development or enlargement of a 1.9 cm spiculated right upper  lobe pulmonary nodule concerning in appearance for malignancy and PET/CT  is recommended for further evaluation.        Pathology:     Tissue Pathology Exam (09/06/2023 08:13)         6/29/23           Assessment / Plan         Assessment and Plan   Mayra Hays is a 78 y.o. year old presents for       Non small cell lung cancer   Cancer Staging   Stage IIIA (cT1c, cN2, cM0)  -Oncology history as above. Patient with 1.9 cm spiculated right upper lobe nodule on low dose CT chest screen (2/2023). EBUS by Dr. Peter negative for malignancy (2/2023). PET/CT with hypermetabolism RUL 2.1 cm and pretracheal region lymph node (6/2023). CT guided biopsy negative for malignancy (6/2023). Navigational bronchoscopy with positive RUL and  4R (paratracheal) for squamous cell (9/6/2023). PD-L1 TPS score 60%.   -Patient not deemed for surgical resection. We reviewed her staging and treatment options which include concurrent chemo-radiation followed by adjuvant immunotherapy. Chemotherapy agents to be used would include weekly carboplatin AUC2 and paclitaxel 50 mg/m2. Chemo will be followed by one year of Durvalumab based on PACIFIC trial.   -Patient experienced anaphylaxis 8 minutes into the paclitaxel on first cycle 10/9. Due to this, paclitaxel will be omitted from weekly  chemotherapy.  -Radiation therapy 10/10; 6000 cGy in 30 treatment fractions. Complete 11/21/2023  -Weekly carboplatin with concurrent radiation 10/18-11/21/2023  -Post treatment CT 12/19/23 with good/partial response  -Cont maintenance durvalumab every 2 weeks; C13 today. Repeat CT chest    2. Anemia; iron deficiency anemia due to GIB  - Patient with normal H/H in our system 2/2023, with acute drop in Hgb 6/29 (10.7) to Hgb (6.5) on 8/9 requiring transfusion. Patient reports one episode of melena two days ago (she is a poor historian).   -Last colonoscopy and endoscopy are unknown; think it may have been >5 years ago  -CBC from 8/14 show Hgb 8.2, Hct 27, MCV 90. Normal WBC and platelet count. Iron studies with normal iron (37), TIBC (435), and low iron saturation (9). This is in light of recent blood transfusion. Normal folate and B12 level. Reticulocyte count noted to be elevated to 6%  -Patient is with fatigue. Denies shortness of breath (aside from baseline COPD) or chest pain. Continues to be on Eliquis for Afib  -Initial work up from consultation confirmed iron deficiency anemia, normal folate/b12 levels. No indication of hemolysis seen with normal LDH and haptoglobin. Myeloma work up has been negative with no M spike on serum protein electrophoresis and normal k/l free light chain ratio. Peripheral smear with normal total WBC without granulocytic dysplasia or blasts.  -Received Ferumoxytol 8/29/2023 and 11/27/23  -Pt to see surgery post treatment for repeat EGD and Colonoscopy     3. Malignancy associated pain  - Currently denies    4. Surveillance (per [facility])  -Surveillance would include CT with and without contrast every 3 to 6 months for 3 years, then every 6 months for 2 years, then low-dose noncontrast CT annually.   Next CT 8/2024, ordered today    5. Petechia of lower extremities   - Normal Plt counts  - Refer to dermatology     Discussed possible differential diagnoses, testing, treatment, recommended  non-pharmacological interventions, risks, warning signs to monitor for that would indicate need for follow-up in clinic or ER. If no improvement with these regimens or you have new or worsening symptoms follow-up. Patient verbalizes understanding and agreement with plan of care. Denies further needs or concerns.     Patient was given instructions and counseling regarding her condition and for health maintenance advised.       All questions were answered to her satisfaction.              Meds ordered during this visit  No orders of the defined types were placed in this encounter.      Visit Diagnoses    ICD-10-CM ICD-9-CM   1. Primary cancer of right upper lobe of lung  C34.11 162.3   2. Petechiae  R23.3 782.7                         Follow Up   In 1 month with tx with CBC, iron studies, ferritin, CMP        This document has been electronically signed by Shawna Bond MD   July 18, 2024 17:18 EDT    Dictated Utilizing Dragon Dictation: Part of this note may be an electronic transcription/translation of spoken language to printed text using the Dragon Dictation System.

## 2024-07-18 ENCOUNTER — OFFICE VISIT (OUTPATIENT)
Dept: ONCOLOGY | Facility: CLINIC | Age: 79
End: 2024-07-18
Payer: MEDICARE

## 2024-07-18 ENCOUNTER — LAB (OUTPATIENT)
Dept: ONCOLOGY | Facility: HOSPITAL | Age: 79
End: 2024-07-18
Payer: MEDICARE

## 2024-07-18 ENCOUNTER — INFUSION (OUTPATIENT)
Dept: ONCOLOGY | Facility: HOSPITAL | Age: 79
End: 2024-07-18
Payer: MEDICARE

## 2024-07-18 VITALS
HEART RATE: 61 BPM | DIASTOLIC BLOOD PRESSURE: 55 MMHG | BODY MASS INDEX: 29.98 KG/M2 | SYSTOLIC BLOOD PRESSURE: 114 MMHG | RESPIRATION RATE: 16 BRPM | OXYGEN SATURATION: 92 % | WEIGHT: 203 LBS | TEMPERATURE: 98.2 F

## 2024-07-18 DIAGNOSIS — R23.3 PETECHIAE: ICD-10-CM

## 2024-07-18 DIAGNOSIS — Z95.828 PORT-A-CATH IN PLACE: ICD-10-CM

## 2024-07-18 DIAGNOSIS — C34.11 PRIMARY CANCER OF RIGHT UPPER LOBE OF LUNG: Primary | ICD-10-CM

## 2024-07-18 DIAGNOSIS — C34.11 PRIMARY CANCER OF RIGHT UPPER LOBE OF LUNG: ICD-10-CM

## 2024-07-18 DIAGNOSIS — C34.91 NON-SMALL CELL CANCER OF RIGHT LUNG: ICD-10-CM

## 2024-07-18 LAB
ALBUMIN SERPL-MCNC: 4.1 G/DL (ref 3.5–5.2)
ALBUMIN/GLOB SERPL: 1.5 G/DL
ALP SERPL-CCNC: 115 U/L (ref 39–117)
ALT SERPL W P-5'-P-CCNC: 11 U/L (ref 1–33)
ANION GAP SERPL CALCULATED.3IONS-SCNC: 10.2 MMOL/L (ref 5–15)
AST SERPL-CCNC: 19 U/L (ref 1–32)
BASOPHILS # BLD AUTO: 0.05 10*3/MM3 (ref 0–0.2)
BASOPHILS NFR BLD AUTO: 0.7 % (ref 0–1.5)
BILIRUB SERPL-MCNC: 0.3 MG/DL (ref 0–1.2)
BUN SERPL-MCNC: 14 MG/DL (ref 8–23)
BUN/CREAT SERPL: 16.5 (ref 7–25)
CALCIUM SPEC-SCNC: 8.9 MG/DL (ref 8.6–10.5)
CHLORIDE SERPL-SCNC: 110 MMOL/L (ref 98–107)
CO2 SERPL-SCNC: 24.8 MMOL/L (ref 22–29)
CREAT SERPL-MCNC: 0.85 MG/DL (ref 0.57–1)
DEPRECATED RDW RBC AUTO: 44.5 FL (ref 37–54)
EGFRCR SERPLBLD CKD-EPI 2021: 70.7 ML/MIN/1.73
EOSINOPHIL # BLD AUTO: 0.23 10*3/MM3 (ref 0–0.4)
EOSINOPHIL NFR BLD AUTO: 3.1 % (ref 0.3–6.2)
ERYTHROCYTE [DISTWIDTH] IN BLOOD BY AUTOMATED COUNT: 14.4 % (ref 12.3–15.4)
GLOBULIN UR ELPH-MCNC: 2.8 GM/DL
GLUCOSE SERPL-MCNC: 101 MG/DL (ref 65–99)
HCT VFR BLD AUTO: 36.5 % (ref 34–46.6)
HGB BLD-MCNC: 11.3 G/DL (ref 12–15.9)
IMM GRANULOCYTES # BLD AUTO: 0.03 10*3/MM3 (ref 0–0.05)
IMM GRANULOCYTES NFR BLD AUTO: 0.4 % (ref 0–0.5)
LYMPHOCYTES # BLD AUTO: 1.1 10*3/MM3 (ref 0.7–3.1)
LYMPHOCYTES NFR BLD AUTO: 14.7 % (ref 19.6–45.3)
MCH RBC QN AUTO: 26.3 PG (ref 26.6–33)
MCHC RBC AUTO-ENTMCNC: 31 G/DL (ref 31.5–35.7)
MCV RBC AUTO: 84.9 FL (ref 79–97)
MONOCYTES # BLD AUTO: 0.66 10*3/MM3 (ref 0.1–0.9)
MONOCYTES NFR BLD AUTO: 8.8 % (ref 5–12)
NEUTROPHILS NFR BLD AUTO: 5.42 10*3/MM3 (ref 1.7–7)
NEUTROPHILS NFR BLD AUTO: 72.3 % (ref 42.7–76)
NRBC BLD AUTO-RTO: 0 /100 WBC (ref 0–0.2)
PLATELET # BLD AUTO: 222 10*3/MM3 (ref 140–450)
PMV BLD AUTO: 10.9 FL (ref 6–12)
POTASSIUM SERPL-SCNC: 4.7 MMOL/L (ref 3.5–5.2)
PROT SERPL-MCNC: 6.9 G/DL (ref 6–8.5)
RBC # BLD AUTO: 4.3 10*6/MM3 (ref 3.77–5.28)
SODIUM SERPL-SCNC: 145 MMOL/L (ref 136–145)
T4 FREE SERPL-MCNC: 0.87 NG/DL (ref 0.93–1.7)
TSH SERPL DL<=0.05 MIU/L-ACNC: 3.18 UIU/ML (ref 0.27–4.2)
WBC NRBC COR # BLD AUTO: 7.49 10*3/MM3 (ref 3.4–10.8)

## 2024-07-18 PROCEDURE — 25010000002 DURVALUMAB 500 MG/10ML SOLUTION 10 ML VIAL: Performed by: INTERNAL MEDICINE

## 2024-07-18 PROCEDURE — 85025 COMPLETE CBC W/AUTO DIFF WBC: CPT | Performed by: INTERNAL MEDICINE

## 2024-07-18 PROCEDURE — 84443 ASSAY THYROID STIM HORMONE: CPT | Performed by: INTERNAL MEDICINE

## 2024-07-18 PROCEDURE — 25810000003 SODIUM CHLORIDE 0.9 % SOLUTION: Performed by: INTERNAL MEDICINE

## 2024-07-18 PROCEDURE — 84439 ASSAY OF FREE THYROXINE: CPT | Performed by: INTERNAL MEDICINE

## 2024-07-18 PROCEDURE — 25810000003 SODIUM CHLORIDE 0.9 % SOLUTION 250 ML FLEX CONT: Performed by: INTERNAL MEDICINE

## 2024-07-18 PROCEDURE — 96413 CHEMO IV INFUSION 1 HR: CPT

## 2024-07-18 PROCEDURE — 80053 COMPREHEN METABOLIC PANEL: CPT | Performed by: INTERNAL MEDICINE

## 2024-07-18 PROCEDURE — 25010000002 HEPARIN LOCK FLUSH PER 10 UNITS: Performed by: NURSE PRACTITIONER

## 2024-07-18 RX ORDER — SODIUM CHLORIDE 9 MG/ML
250 INJECTION, SOLUTION INTRAVENOUS ONCE
Status: COMPLETED | OUTPATIENT
Start: 2024-07-18 | End: 2024-07-18

## 2024-07-18 RX ORDER — SODIUM CHLORIDE 0.9 % (FLUSH) 0.9 %
10 SYRINGE (ML) INJECTION AS NEEDED
OUTPATIENT
Start: 2024-07-18

## 2024-07-18 RX ORDER — HEPARIN SODIUM (PORCINE) LOCK FLUSH IV SOLN 100 UNIT/ML 100 UNIT/ML
500 SOLUTION INTRAVENOUS AS NEEDED
Status: DISCONTINUED | OUTPATIENT
Start: 2024-07-18 | End: 2024-07-18 | Stop reason: HOSPADM

## 2024-07-18 RX ORDER — HEPARIN SODIUM (PORCINE) LOCK FLUSH IV SOLN 100 UNIT/ML 100 UNIT/ML
300 SOLUTION INTRAVENOUS ONCE
OUTPATIENT
Start: 2024-07-18

## 2024-07-18 RX ORDER — SODIUM CHLORIDE 0.9 % (FLUSH) 0.9 %
10 SYRINGE (ML) INJECTION AS NEEDED
Status: DISCONTINUED | OUTPATIENT
Start: 2024-07-18 | End: 2024-07-18 | Stop reason: HOSPADM

## 2024-07-18 RX ORDER — HEPARIN SODIUM (PORCINE) LOCK FLUSH IV SOLN 100 UNIT/ML 100 UNIT/ML
500 SOLUTION INTRAVENOUS AS NEEDED
OUTPATIENT
Start: 2024-07-18

## 2024-07-18 RX ORDER — SODIUM CHLORIDE 0.9 % (FLUSH) 0.9 %
20 SYRINGE (ML) INJECTION AS NEEDED
OUTPATIENT
Start: 2024-07-18

## 2024-07-18 RX ADMIN — Medication 10 ML: at 15:54

## 2024-07-18 RX ADMIN — SODIUM CHLORIDE 250 ML: 9 INJECTION, SOLUTION INTRAVENOUS at 14:38

## 2024-07-18 RX ADMIN — SODIUM CHLORIDE 920 MG: 9 INJECTION, SOLUTION INTRAVENOUS at 14:39

## 2024-07-18 RX ADMIN — Medication 500 UNITS: at 15:54

## 2024-07-23 ENCOUNTER — DOCUMENTATION (OUTPATIENT)
Dept: ONCOLOGY | Facility: CLINIC | Age: 79
End: 2024-07-23
Payer: MEDICARE

## 2024-07-30 DIAGNOSIS — C34.91 NON-SMALL CELL CANCER OF RIGHT LUNG: Primary | ICD-10-CM

## 2024-07-30 DIAGNOSIS — C34.11 PRIMARY CANCER OF RIGHT UPPER LOBE OF LUNG: ICD-10-CM

## 2024-07-30 RX ORDER — SODIUM CHLORIDE 9 MG/ML
250 INJECTION, SOLUTION INTRAVENOUS ONCE
Status: CANCELLED | OUTPATIENT
Start: 2024-08-01

## 2024-07-30 RX ORDER — SODIUM CHLORIDE 9 MG/ML
250 INJECTION, SOLUTION INTRAVENOUS ONCE
OUTPATIENT
Start: 2024-08-15

## 2024-08-01 ENCOUNTER — HOSPITAL ENCOUNTER (OUTPATIENT)
Dept: CT IMAGING | Facility: HOSPITAL | Age: 79
Discharge: HOME | End: 2024-08-01
Admitting: INTERNAL MEDICINE
Payer: MEDICARE

## 2024-08-01 ENCOUNTER — INFUSION (OUTPATIENT)
Dept: ONCOLOGY | Facility: HOSPITAL | Age: 79
End: 2024-08-01
Payer: MEDICARE

## 2024-08-01 ENCOUNTER — LAB (OUTPATIENT)
Dept: ONCOLOGY | Facility: HOSPITAL | Age: 79
End: 2024-08-01
Payer: MEDICARE

## 2024-08-01 VITALS
SYSTOLIC BLOOD PRESSURE: 166 MMHG | RESPIRATION RATE: 18 BRPM | TEMPERATURE: 97.3 F | DIASTOLIC BLOOD PRESSURE: 66 MMHG | OXYGEN SATURATION: 92 % | HEART RATE: 62 BPM

## 2024-08-01 DIAGNOSIS — C34.11 PRIMARY CANCER OF RIGHT UPPER LOBE OF LUNG: ICD-10-CM

## 2024-08-01 DIAGNOSIS — C34.91 NON-SMALL CELL CANCER OF RIGHT LUNG: ICD-10-CM

## 2024-08-01 DIAGNOSIS — Z95.828 PORT-A-CATH IN PLACE: Primary | ICD-10-CM

## 2024-08-01 LAB
ALBUMIN SERPL-MCNC: 4.1 G/DL (ref 3.5–5.2)
ALBUMIN/GLOB SERPL: 1.3 G/DL
ALP SERPL-CCNC: 121 U/L (ref 39–117)
ALT SERPL W P-5'-P-CCNC: 13 U/L (ref 1–33)
ANION GAP SERPL CALCULATED.3IONS-SCNC: 10.9 MMOL/L (ref 5–15)
AST SERPL-CCNC: 19 U/L (ref 1–32)
BASOPHILS # BLD AUTO: 0.07 10*3/MM3 (ref 0–0.2)
BASOPHILS NFR BLD AUTO: 1.2 % (ref 0–1.5)
BILIRUB SERPL-MCNC: 0.3 MG/DL (ref 0–1.2)
BUN SERPL-MCNC: 14 MG/DL (ref 8–23)
BUN/CREAT SERPL: 16.1 (ref 7–25)
CALCIUM SPEC-SCNC: 9.3 MG/DL (ref 8.6–10.5)
CHLORIDE SERPL-SCNC: 101 MMOL/L (ref 98–107)
CO2 SERPL-SCNC: 27.1 MMOL/L (ref 22–29)
CREAT SERPL-MCNC: 0.87 MG/DL (ref 0.57–1)
DEPRECATED RDW RBC AUTO: 43.9 FL (ref 37–54)
EGFRCR SERPLBLD CKD-EPI 2021: 68.3 ML/MIN/1.73
EOSINOPHIL # BLD AUTO: 0.27 10*3/MM3 (ref 0–0.4)
EOSINOPHIL NFR BLD AUTO: 4.5 % (ref 0.3–6.2)
ERYTHROCYTE [DISTWIDTH] IN BLOOD BY AUTOMATED COUNT: 14.3 % (ref 12.3–15.4)
GLOBULIN UR ELPH-MCNC: 3.1 GM/DL
GLUCOSE SERPL-MCNC: 92 MG/DL (ref 65–99)
HCT VFR BLD AUTO: 39.1 % (ref 34–46.6)
HGB BLD-MCNC: 12 G/DL (ref 12–15.9)
IMM GRANULOCYTES # BLD AUTO: 0.02 10*3/MM3 (ref 0–0.05)
IMM GRANULOCYTES NFR BLD AUTO: 0.3 % (ref 0–0.5)
LYMPHOCYTES # BLD AUTO: 1.29 10*3/MM3 (ref 0.7–3.1)
LYMPHOCYTES NFR BLD AUTO: 21.6 % (ref 19.6–45.3)
MCH RBC QN AUTO: 26 PG (ref 26.6–33)
MCHC RBC AUTO-ENTMCNC: 30.7 G/DL (ref 31.5–35.7)
MCV RBC AUTO: 84.6 FL (ref 79–97)
MONOCYTES # BLD AUTO: 0.59 10*3/MM3 (ref 0.1–0.9)
MONOCYTES NFR BLD AUTO: 9.9 % (ref 5–12)
NEUTROPHILS NFR BLD AUTO: 3.72 10*3/MM3 (ref 1.7–7)
NEUTROPHILS NFR BLD AUTO: 62.5 % (ref 42.7–76)
NRBC BLD AUTO-RTO: 0 /100 WBC (ref 0–0.2)
PLATELET # BLD AUTO: 230 10*3/MM3 (ref 140–450)
PMV BLD AUTO: 10.9 FL (ref 6–12)
POTASSIUM SERPL-SCNC: 4.8 MMOL/L (ref 3.5–5.2)
PROT SERPL-MCNC: 7.2 G/DL (ref 6–8.5)
RBC # BLD AUTO: 4.62 10*6/MM3 (ref 3.77–5.28)
SODIUM SERPL-SCNC: 139 MMOL/L (ref 136–145)
WBC NRBC COR # BLD AUTO: 5.96 10*3/MM3 (ref 3.4–10.8)

## 2024-08-01 PROCEDURE — 25010000002 DURVALUMAB 50 MG/ML SOLUTION 10 ML VIAL: Performed by: INTERNAL MEDICINE

## 2024-08-01 PROCEDURE — 71260 CT THORAX DX C+: CPT

## 2024-08-01 PROCEDURE — 25810000003 SODIUM CHLORIDE 0.9 % SOLUTION: Performed by: INTERNAL MEDICINE

## 2024-08-01 PROCEDURE — 80053 COMPREHEN METABOLIC PANEL: CPT

## 2024-08-01 PROCEDURE — 71260 CT THORAX DX C+: CPT | Performed by: RADIOLOGY

## 2024-08-01 PROCEDURE — 25010000002 HEPARIN LOCK FLUSH PER 10 UNITS: Performed by: NURSE PRACTITIONER

## 2024-08-01 PROCEDURE — 96413 CHEMO IV INFUSION 1 HR: CPT

## 2024-08-01 PROCEDURE — 85025 COMPLETE CBC W/AUTO DIFF WBC: CPT

## 2024-08-01 PROCEDURE — 25810000003 SODIUM CHLORIDE 0.9 % SOLUTION 250 ML FLEX CONT: Performed by: INTERNAL MEDICINE

## 2024-08-01 PROCEDURE — 25510000001 IOPAMIDOL 61 % SOLUTION: Performed by: INTERNAL MEDICINE

## 2024-08-01 RX ORDER — SODIUM CHLORIDE 0.9 % (FLUSH) 0.9 %
10 SYRINGE (ML) INJECTION AS NEEDED
OUTPATIENT
Start: 2024-08-01

## 2024-08-01 RX ORDER — HEPARIN SODIUM (PORCINE) LOCK FLUSH IV SOLN 100 UNIT/ML 100 UNIT/ML
300 SOLUTION INTRAVENOUS ONCE
OUTPATIENT
Start: 2024-08-01

## 2024-08-01 RX ORDER — SODIUM CHLORIDE 0.9 % (FLUSH) 0.9 %
10 SYRINGE (ML) INJECTION AS NEEDED
Status: DISCONTINUED | OUTPATIENT
Start: 2024-08-01 | End: 2024-08-01 | Stop reason: HOSPADM

## 2024-08-01 RX ORDER — HEPARIN SODIUM (PORCINE) LOCK FLUSH IV SOLN 100 UNIT/ML 100 UNIT/ML
500 SOLUTION INTRAVENOUS AS NEEDED
Status: DISCONTINUED | OUTPATIENT
Start: 2024-08-01 | End: 2024-08-01 | Stop reason: HOSPADM

## 2024-08-01 RX ORDER — SODIUM CHLORIDE 0.9 % (FLUSH) 0.9 %
20 SYRINGE (ML) INJECTION AS NEEDED
OUTPATIENT
Start: 2024-08-01

## 2024-08-01 RX ORDER — SODIUM CHLORIDE 9 MG/ML
250 INJECTION, SOLUTION INTRAVENOUS ONCE
Status: COMPLETED | OUTPATIENT
Start: 2024-08-01 | End: 2024-08-01

## 2024-08-01 RX ORDER — HEPARIN SODIUM (PORCINE) LOCK FLUSH IV SOLN 100 UNIT/ML 100 UNIT/ML
500 SOLUTION INTRAVENOUS AS NEEDED
OUTPATIENT
Start: 2024-08-01

## 2024-08-01 RX ADMIN — SODIUM CHLORIDE 1000 MG: 9 INJECTION, SOLUTION INTRAVENOUS at 15:29

## 2024-08-01 RX ADMIN — IOPAMIDOL 80 ML: 612 INJECTION, SOLUTION INTRAVENOUS at 14:16

## 2024-08-01 RX ADMIN — Medication 500 UNITS: at 16:35

## 2024-08-01 RX ADMIN — Medication 10 ML: at 16:35

## 2024-08-01 RX ADMIN — SODIUM CHLORIDE 250 ML: 9 INJECTION, SOLUTION INTRAVENOUS at 15:29

## 2024-08-06 ENCOUNTER — DOCUMENTATION (OUTPATIENT)
Dept: ONCOLOGY | Facility: CLINIC | Age: 79
End: 2024-08-06
Payer: MEDICARE

## 2024-08-07 ENCOUNTER — OFFICE VISIT (OUTPATIENT)
Dept: PULMONOLOGY | Facility: CLINIC | Age: 79
End: 2024-08-07
Payer: MEDICARE

## 2024-08-07 VITALS
OXYGEN SATURATION: 89 % | HEIGHT: 69 IN | HEART RATE: 86 BPM | SYSTOLIC BLOOD PRESSURE: 128 MMHG | WEIGHT: 202.8 LBS | BODY MASS INDEX: 30.04 KG/M2 | TEMPERATURE: 97.7 F | DIASTOLIC BLOOD PRESSURE: 76 MMHG

## 2024-08-07 DIAGNOSIS — Z87.891 FORMER SMOKER: ICD-10-CM

## 2024-08-07 DIAGNOSIS — C34.91 NON-SMALL CELL CANCER OF RIGHT LUNG: ICD-10-CM

## 2024-08-07 DIAGNOSIS — J96.11 CHRONIC RESPIRATORY FAILURE WITH HYPOXIA: ICD-10-CM

## 2024-08-07 DIAGNOSIS — J44.9 CHRONIC OBSTRUCTIVE PULMONARY DISEASE, UNSPECIFIED COPD TYPE: Primary | ICD-10-CM

## 2024-08-07 PROCEDURE — 1159F MED LIST DOCD IN RCRD: CPT | Performed by: NURSE PRACTITIONER

## 2024-08-07 PROCEDURE — G2211 COMPLEX E/M VISIT ADD ON: HCPCS | Performed by: NURSE PRACTITIONER

## 2024-08-07 PROCEDURE — 3023F SPIROM DOC REV: CPT | Performed by: NURSE PRACTITIONER

## 2024-08-07 PROCEDURE — 3074F SYST BP LT 130 MM HG: CPT | Performed by: NURSE PRACTITIONER

## 2024-08-07 PROCEDURE — 3078F DIAST BP <80 MM HG: CPT | Performed by: NURSE PRACTITIONER

## 2024-08-07 PROCEDURE — 1160F RVW MEDS BY RX/DR IN RCRD: CPT | Performed by: NURSE PRACTITIONER

## 2024-08-07 PROCEDURE — 99214 OFFICE O/P EST MOD 30 MIN: CPT | Performed by: NURSE PRACTITIONER

## 2024-08-07 RX ORDER — ALBUTEROL SULFATE 90 UG/1
2 AEROSOL, METERED RESPIRATORY (INHALATION) EVERY 4 HOURS PRN
Qty: 18 G | Refills: 5 | Status: SHIPPED | OUTPATIENT
Start: 2024-08-07

## 2024-08-07 RX ORDER — ATORVASTATIN CALCIUM 20 MG/1
1 TABLET, FILM COATED ORAL DAILY
COMMUNITY
Start: 2024-07-31

## 2024-08-07 NOTE — PROGRESS NOTES
"Chief Complaint  COPD    Subjective        Mayra Hays presents to [facility] PULMONARY & CRITICAL CARE MEDICINE  History of Present Illness    Ms. Hays is a 79 year old female with a medical history significant for stroke, arthritis, CHF, CKD, COPD, diabetes, GERD, hypertension, hyperlipidemia, sleep apnea and non small call lung cancer.    She presents today for follow up on COPD.  She states that she has been doing well.  She states that her breathing has been at baseline.  She is currently taking albuterol as needed, duoneds as needed, and Breztri BID.  She is also compliant with use of supplemental oxygen at night and as needed with exertion.  She reports that she has her oxygen in the car.  Oxygen saturation was noted to be 89% on room air after ambulating to the room.  She continue to follow with oncology and is doing maintenance chemotherapy.  She is a former smoker.    Objective   Vital Signs:  /76   Pulse 86   Temp 97.7 °F (36.5 °C)   Ht 175.3 cm (69\")   Wt 92 kg (202 lb 12.8 oz)   SpO2 (!) 89%   BMI 29.95 kg/m²   Estimated body mass index is 29.95 kg/m² as calculated from the following:    Height as of this encounter: 175.3 cm (69\").    Weight as of this encounter: 92 kg (202 lb 12.8 oz).       BMI is >= 25 and <30. (Overweight) The following options were offered after discussion;: exercise counseling/recommendations and nutrition counseling/recommendations      Physical Exam     GENERAL APPEARANCE: Well developed, well nourished, alert and cooperative, and appears to be in no acute distress.    HEAD: normocephalic. Atraumatic.    EYES: PERRL, EOMI. Vision is grossly intact.    THROAT: Oral cavity and pharynx normal. No inflammation, swelling, exudate, or lesions.     NECK: Neck supple.  No thyromegaly.    CARDIAC: Normal S1 and S2. No S3, S4 or murmurs. Rhythm is regular.     RESPIRATORY:Bilateral air entry positive. Bilateral wheezing.    GI: Positive bowel sounds. Soft, " nondistended, nontender.     MUSCULOSKELETAL: No significant deformity or joint abnormality. No edema. Peripheral pulses intact. No varicosities.    NEUROLOGICAL: Strength and sensation symmetric and intact throughout.     PSYCHIATRIC: The mental examination revealed the patient was oriented to person, place, and time.     Result Review :    The following data was reviewed by: ERASTO Mckeon on 08/07/2024:  Common labs          7/2/2024    12:44 7/18/2024    13:43 8/1/2024    14:37   Common Labs   Glucose 142  101  92    BUN 17  14  14    Creatinine 0.96  0.85  0.87    Sodium 140  145  139    Potassium 3.9  4.7  4.8    Chloride 102  110  101    Calcium 9.9  8.9  9.3    Albumin 4.1  4.1  4.1    Total Bilirubin 0.4  0.3  0.3    Alkaline Phosphatase 114  115  121    AST (SGOT) 12  19  19    ALT (SGPT) 10  11  13    WBC 7.45  7.49  5.96    Hemoglobin 11.9  11.3  12.0    Hematocrit 38.0  36.5  39.1    Platelets 209  222  230                    Assessment and Plan     Diagnoses and all orders for this visit:    1. Chronic obstructive pulmonary disease, unspecified COPD type (Primary)    2. Chronic respiratory failure with hypoxia    3. Former smoker    4. Non-small cell cancer of right lung    Other orders  -     albuterol sulfate  (90 Base) MCG/ACT inhaler; Inhale 2 puffs Every 4 (Four) Hours As Needed for Wheezing.  Dispense: 18 g; Refill: 5          She was screened for alpha-1 antitrypsin deficiency at her last visit. Results are not back. Will follow these.  Continue albuterol inhaler as needed.  Continue duonebs as needed.  Continue Breztri BID.    She is compliant with use of supplemental oxygyen at night and as needed.  She has not been able to get a POC yet as she has not yet met the requirement of 8 tanks for 3 consecutive months.       She is a former smoker.    She continues to follow with oncology and is currently on a maintenance dose of chemotherapy (Durvalumab.)  CT Chest was completed  in 8/1/24.  Imaging was reviewed.  Continue imaging per oncology recommendations.    Spirometry was completed in office.  She was unable to breath out for 6 seconds so results are likely to be inaccurate.      Follow Up     Return in about 3 months (around 11/7/2024).  Patient was given instructions and counseling regarding her condition or for health maintenance advice. Please see specific information pulled into the AVS if appropriate.

## 2024-08-13 ENCOUNTER — DOCUMENTATION (OUTPATIENT)
Dept: ONCOLOGY | Facility: CLINIC | Age: 79
End: 2024-08-13
Payer: MEDICARE

## 2024-08-13 LAB
FEV1/FVC: 83 %
FEV1: 0.86 LITERS
FVC VOL RESPIRATORY: 1.03 LITERS
PEF: 1.77 L/S

## 2024-08-13 ASSESSMENT — PULMONARY FUNCTION TESTS
FEV1/FVC: 83
FVC: 1.03
FEV1: 0.86

## 2024-08-15 ENCOUNTER — INFUSION (OUTPATIENT)
Dept: ONCOLOGY | Facility: HOSPITAL | Age: 79
End: 2024-08-15
Payer: MEDICARE

## 2024-08-15 ENCOUNTER — OFFICE VISIT (OUTPATIENT)
Dept: ONCOLOGY | Facility: CLINIC | Age: 79
End: 2024-08-15
Payer: MEDICARE

## 2024-08-15 ENCOUNTER — LAB (OUTPATIENT)
Dept: ONCOLOGY | Facility: HOSPITAL | Age: 79
End: 2024-08-15
Payer: MEDICARE

## 2024-08-15 VITALS
BODY MASS INDEX: 29.83 KG/M2 | RESPIRATION RATE: 18 BRPM | SYSTOLIC BLOOD PRESSURE: 141 MMHG | HEART RATE: 72 BPM | TEMPERATURE: 97.5 F | WEIGHT: 202 LBS | DIASTOLIC BLOOD PRESSURE: 73 MMHG | OXYGEN SATURATION: 92 %

## 2024-08-15 VITALS
RESPIRATION RATE: 18 BRPM | BODY MASS INDEX: 29.83 KG/M2 | SYSTOLIC BLOOD PRESSURE: 141 MMHG | OXYGEN SATURATION: 92 % | HEART RATE: 72 BPM | TEMPERATURE: 97.5 F | DIASTOLIC BLOOD PRESSURE: 73 MMHG | WEIGHT: 202 LBS

## 2024-08-15 DIAGNOSIS — C34.91 NON-SMALL CELL CANCER OF RIGHT LUNG: ICD-10-CM

## 2024-08-15 DIAGNOSIS — Z95.828 PORT-A-CATH IN PLACE: ICD-10-CM

## 2024-08-15 DIAGNOSIS — D64.9 ACUTE ANEMIA: ICD-10-CM

## 2024-08-15 DIAGNOSIS — C34.11 PRIMARY CANCER OF RIGHT UPPER LOBE OF LUNG: ICD-10-CM

## 2024-08-15 DIAGNOSIS — C34.91 NON-SMALL CELL CANCER OF RIGHT LUNG: Primary | ICD-10-CM

## 2024-08-15 DIAGNOSIS — K90.49 MALABSORPTION DUE TO INTOLERANCE, NOT ELSEWHERE CLASSIFIED: ICD-10-CM

## 2024-08-15 DIAGNOSIS — D50.9 IRON DEFICIENCY ANEMIA, UNSPECIFIED IRON DEFICIENCY ANEMIA TYPE: ICD-10-CM

## 2024-08-15 LAB
ALBUMIN SERPL-MCNC: 4.1 G/DL (ref 3.5–5.2)
ALBUMIN/GLOB SERPL: 1.4 G/DL
ALP SERPL-CCNC: 111 U/L (ref 39–117)
ALT SERPL W P-5'-P-CCNC: 11 U/L (ref 1–33)
ANION GAP SERPL CALCULATED.3IONS-SCNC: 13.3 MMOL/L (ref 5–15)
AST SERPL-CCNC: 13 U/L (ref 1–32)
BASOPHILS # BLD AUTO: 0.06 10*3/MM3 (ref 0–0.2)
BASOPHILS NFR BLD AUTO: 0.8 % (ref 0–1.5)
BILIRUB SERPL-MCNC: 0.2 MG/DL (ref 0–1.2)
BUN SERPL-MCNC: 17 MG/DL (ref 8–23)
BUN/CREAT SERPL: 15.7 (ref 7–25)
CALCIUM SPEC-SCNC: 9.4 MG/DL (ref 8.6–10.5)
CHLORIDE SERPL-SCNC: 102 MMOL/L (ref 98–107)
CO2 SERPL-SCNC: 25.7 MMOL/L (ref 22–29)
CREAT SERPL-MCNC: 1.08 MG/DL (ref 0.57–1)
DEPRECATED RDW RBC AUTO: 43.1 FL (ref 37–54)
EGFRCR SERPLBLD CKD-EPI 2021: 52.7 ML/MIN/1.73
EOSINOPHIL # BLD AUTO: 0.22 10*3/MM3 (ref 0–0.4)
EOSINOPHIL NFR BLD AUTO: 3.1 % (ref 0.3–6.2)
ERYTHROCYTE [DISTWIDTH] IN BLOOD BY AUTOMATED COUNT: 13.9 % (ref 12.3–15.4)
FERRITIN SERPL-MCNC: 74.89 NG/ML (ref 13–150)
GLOBULIN UR ELPH-MCNC: 2.9 GM/DL
GLUCOSE SERPL-MCNC: 233 MG/DL (ref 65–99)
HCT VFR BLD AUTO: 38.9 % (ref 34–46.6)
HGB BLD-MCNC: 11.9 G/DL (ref 12–15.9)
IMM GRANULOCYTES # BLD AUTO: 0.04 10*3/MM3 (ref 0–0.05)
IMM GRANULOCYTES NFR BLD AUTO: 0.6 % (ref 0–0.5)
IRON 24H UR-MRATE: 64 MCG/DL (ref 37–145)
IRON SATN MFR SERPL: 16 % (ref 20–50)
LYMPHOCYTES # BLD AUTO: 1.03 10*3/MM3 (ref 0.7–3.1)
LYMPHOCYTES NFR BLD AUTO: 14.5 % (ref 19.6–45.3)
MCH RBC QN AUTO: 26 PG (ref 26.6–33)
MCHC RBC AUTO-ENTMCNC: 30.6 G/DL (ref 31.5–35.7)
MCV RBC AUTO: 85.1 FL (ref 79–97)
MONOCYTES # BLD AUTO: 0.58 10*3/MM3 (ref 0.1–0.9)
MONOCYTES NFR BLD AUTO: 8.2 % (ref 5–12)
NEUTROPHILS NFR BLD AUTO: 5.16 10*3/MM3 (ref 1.7–7)
NEUTROPHILS NFR BLD AUTO: 72.8 % (ref 42.7–76)
NRBC BLD AUTO-RTO: 0 /100 WBC (ref 0–0.2)
PLATELET # BLD AUTO: 208 10*3/MM3 (ref 140–450)
PMV BLD AUTO: 10.7 FL (ref 6–12)
POTASSIUM SERPL-SCNC: 4 MMOL/L (ref 3.5–5.2)
PROT SERPL-MCNC: 7 G/DL (ref 6–8.5)
RBC # BLD AUTO: 4.57 10*6/MM3 (ref 3.77–5.28)
SODIUM SERPL-SCNC: 141 MMOL/L (ref 136–145)
TIBC SERPL-MCNC: 407 MCG/DL (ref 298–536)
TRANSFERRIN SERPL-MCNC: 273 MG/DL (ref 200–360)
WBC NRBC COR # BLD AUTO: 7.09 10*3/MM3 (ref 3.4–10.8)

## 2024-08-15 PROCEDURE — 25010000002 DURVALUMAB 500 MG/10ML SOLUTION 10 ML VIAL: Performed by: INTERNAL MEDICINE

## 2024-08-15 PROCEDURE — 85025 COMPLETE CBC W/AUTO DIFF WBC: CPT

## 2024-08-15 PROCEDURE — 1126F AMNT PAIN NOTED NONE PRSNT: CPT | Performed by: INTERNAL MEDICINE

## 2024-08-15 PROCEDURE — 84466 ASSAY OF TRANSFERRIN: CPT

## 2024-08-15 PROCEDURE — 82728 ASSAY OF FERRITIN: CPT

## 2024-08-15 PROCEDURE — 25010000002 HEPARIN LOCK FLUSH PER 10 UNITS: Performed by: NURSE PRACTITIONER

## 2024-08-15 PROCEDURE — 80053 COMPREHEN METABOLIC PANEL: CPT

## 2024-08-15 PROCEDURE — 25810000003 SODIUM CHLORIDE 0.9 % SOLUTION 250 ML FLEX CONT: Performed by: INTERNAL MEDICINE

## 2024-08-15 PROCEDURE — 83540 ASSAY OF IRON: CPT

## 2024-08-15 PROCEDURE — 25810000003 SODIUM CHLORIDE 0.9 % SOLUTION: Performed by: INTERNAL MEDICINE

## 2024-08-15 PROCEDURE — 99214 OFFICE O/P EST MOD 30 MIN: CPT | Performed by: INTERNAL MEDICINE

## 2024-08-15 PROCEDURE — 3078F DIAST BP <80 MM HG: CPT | Performed by: INTERNAL MEDICINE

## 2024-08-15 PROCEDURE — 96413 CHEMO IV INFUSION 1 HR: CPT

## 2024-08-15 PROCEDURE — 3077F SYST BP >= 140 MM HG: CPT | Performed by: INTERNAL MEDICINE

## 2024-08-15 PROCEDURE — G2211 COMPLEX E/M VISIT ADD ON: HCPCS | Performed by: INTERNAL MEDICINE

## 2024-08-15 RX ORDER — SODIUM CHLORIDE 9 MG/ML
250 INJECTION, SOLUTION INTRAVENOUS ONCE
Status: COMPLETED | OUTPATIENT
Start: 2024-08-15 | End: 2024-08-15

## 2024-08-15 RX ORDER — SODIUM CHLORIDE 0.9 % (FLUSH) 0.9 %
10 SYRINGE (ML) INJECTION AS NEEDED
OUTPATIENT
Start: 2024-08-15

## 2024-08-15 RX ORDER — HEPARIN SODIUM (PORCINE) LOCK FLUSH IV SOLN 100 UNIT/ML 100 UNIT/ML
500 SOLUTION INTRAVENOUS AS NEEDED
Status: DISCONTINUED | OUTPATIENT
Start: 2024-08-15 | End: 2024-08-15 | Stop reason: HOSPADM

## 2024-08-15 RX ORDER — SODIUM CHLORIDE 0.9 % (FLUSH) 0.9 %
10 SYRINGE (ML) INJECTION AS NEEDED
Status: DISCONTINUED | OUTPATIENT
Start: 2024-08-15 | End: 2024-08-15 | Stop reason: HOSPADM

## 2024-08-15 RX ORDER — SODIUM CHLORIDE 9 MG/ML
250 INJECTION, SOLUTION INTRAVENOUS ONCE
OUTPATIENT
Start: 2024-09-12

## 2024-08-15 RX ORDER — SODIUM CHLORIDE 0.9 % (FLUSH) 0.9 %
20 SYRINGE (ML) INJECTION AS NEEDED
OUTPATIENT
Start: 2024-08-15

## 2024-08-15 RX ORDER — HEPARIN SODIUM (PORCINE) LOCK FLUSH IV SOLN 100 UNIT/ML 100 UNIT/ML
300 SOLUTION INTRAVENOUS ONCE
OUTPATIENT
Start: 2024-08-15

## 2024-08-15 RX ORDER — HEPARIN SODIUM (PORCINE) LOCK FLUSH IV SOLN 100 UNIT/ML 100 UNIT/ML
500 SOLUTION INTRAVENOUS AS NEEDED
OUTPATIENT
Start: 2024-08-15

## 2024-08-15 RX ORDER — SODIUM CHLORIDE 9 MG/ML
250 INJECTION, SOLUTION INTRAVENOUS ONCE
OUTPATIENT
Start: 2024-08-29

## 2024-08-15 RX ADMIN — SODIUM CHLORIDE 250 ML: 9 INJECTION, SOLUTION INTRAVENOUS at 14:53

## 2024-08-15 RX ADMIN — Medication 10 ML: at 16:07

## 2024-08-15 RX ADMIN — HEPARIN 500 UNITS: 100 SYRINGE at 16:07

## 2024-08-15 RX ADMIN — SODIUM CHLORIDE 1000 MG: 9 INJECTION, SOLUTION INTRAVENOUS at 14:55

## 2024-08-15 NOTE — PROGRESS NOTES
Subjective     Date: 8/15/2024    Referring Provider  No ref. provider found    Chief Complaint  Symptomatic anemia   2. Non small Cell Lung Cancer  Cancer Staging   Stage IIIA (cT1c, cN2, cM0)        Subjective      Mayra Hays is a 79 y.o. female who presents today to [facility] for follow up.    HPI:   79 y.o. female with past medical history of diabetes mellitus type 2, hyperlipidemia, COPD, hypertension, atrial fibrillation on anticoagulation (Eliquis), h/o CVA and previous tobacco use presents for symptomatic anemia and NSCLC.    She is present today with her daughter, Hailey, who is assisting with history. Pt started work up for RUL lung nodule , since then noticed to have a decrease in her Hgb to 10.7 from normal in 2023. More recently, her Hgb  was 6.5, she was directed to ED where she received 1U PRBC.     Patient reports increased fatigue over the last several months. She reports no bleeding, however does report dark colored stool (melena) two days ago. Denies any other evidence of melena or hematochezia.     She has previously been diagnosed with iron deficiency (years ago) requiring oral iron supplements, but never received IV iron or bone marrow biopsy.    She and her daughter are unsure of her last colonoscopy and endoscopy history, think it may have occurred >5 years ago but unsure.    She denies recent weight loss, fever, chills,  night sweats.  Previous smoker, quit 5-6 years ago, smoked 3 packs/day X 30 years. Denies alcohol or drug use. Family history significant for son with leukemia, brothers with lung cancer.     Oncology History:  2023: low dose CT chest screenin.9 cm spiculated right upper lobe pulmonary nodule concerning for malignancy, PET/CT recommended.   2023: EBUS by Dr. Peter negative for malignancy for bronchial washing and FNA of station 10R  2023: PET/CT: Right upper lobe pulmonary nodule, more  posterior possible satellite nodules are postobstructive process measuring 2.1 cm with mSUV 14.5. Also with pretracheal region lymph node 2.6 cm.   6/29/2023: CT guided needle biopsy was non diagnostic, showed fibrosis and chronic inflammation.   8/9/2023: Lab work showed anemia with Hgb 6.5. Referred to ED. Bronchoscopy deferred.   9/6/2023: Navigational bronchoscopy performed by Dr. Clemens- Right upper lobe lung transbronchial biopsy revealed squamous cell carcinoma, lymph node at station 4R also involved with squamous cell carcinoma. PD-L1 TPS 60%. Patient not deemed a surgical candidate.  9/13-11/21/2023: Received weekly carboplatin  X 7 and radiation. Patient had a reaction to paclitaxel, it was omitted.   12/19/2023: Post treatment CT chest with improvement of masslike process RUL now 1.6 cm, suggest significant response to treatment. Mediastinal lymph nodes are now within normal limits of size.     Ms. Hays presents today with her daughter, grand daughter, and great grand daughters. She is in a wheelchair. She lives alone with her grand son, able to perform her ADLs including cooking, cleaning.     Treatment history:        2.       Interval History 10/11/2023   Ms. Hays presents for follow up today, accompanied by her daughter in law. She started treatment with paclitaxel and carboplatin on 10/9, unfortunately had a anaphylactic reaction to taxol after 8 minutes of infusion causing her to go to the ED. Patient was awake and feeling back to herself at the time of ED visit. Carboplatin was not administered.     She reports doing well overall, no new symptoms. Started radiation therapy yesterday.      Interval History 10/24/2023   Ms. Hays presents for cycle 2 of carboplatin with concurrent radiation. Did well with first cycle. Denies any adverse effects including nausea, vomiting, diarrhea, neuropathy. Appetite is good. Radiation is going well. No complaints today.  Denies any skin rash or  bites.    Interval History 11/21/2023   Ms. Hays presents today for cycle 7 of carboplatin with concurrent radiation. She reports good tolerance thus far without neuropathy, nausea, vomiting, diarrhea. Appetite is stable.   Denies any GIB.    Interval History 01/11/2024   Ms. Hays presents after completion of therapy. She was admitted to the hospital 12/31 due to shortness of breath, found to have Afib with RVR and coronavirus HKU. She received inpatient antibiotics and steroids. Currently with improvement, still reports some fatigue.   We reviewed her last CT chest, which shows improvement after treatment. Discussed continuing immunotherapy.      Interval History 02/22/2024   Ms. Hays presents today for follow, prior to cycle 3 of maintenance durvalumab. Accompanied by her grand daughter's fiance. She reports improved energy. Denies any bleeding, nausea, vomiting, diarrhea, rash. On examination, noted to have petechia on bilateral distal lower extremities and R distal upper extremities, she believes these started today. Denies pruritus.     Interval History 03/21/2024    presents prior to C5 of consolidation therapy with durvalumab. She reports good tolerance to the last treatment, denies shortness of breath, diarrhea, nausea/vomiting, fatigue. She's been applying lotion to her legs, petechia has decreased, primarily just on her left leg now.     Interval History 04/18/2024   Ms. Hays presents prior to C7 of durvalumab. Reports good tolerance without nausea, vomiting, diarrhea. No changes in petechia. Denies any other complaints.    Interval History 06/13/2024   Ms. Hays presents for follow up prior to cycle 11 of durvalumab. She presented to ED 6/6 due to shortness of breath and cough. CXR showed RUL and left basilar airspace disease for which she received levofloxacin. Today with improvement in symptoms. Denies diarrhea, nausea, vomiting.     Interval History 07/18/2024   Ms. Hays presents for  follow-up, prior to cycle 13 of consolidation Durvalumab. She reports good tolerance, denies any cough/shortness of breath, NVD. Continues to have non pruritic petechia on her legs.     Interval History 08/15/2024   Ms. Hays presents for follow up, prior to C 15 of Durvalumab. Surveillance CT chest 8/1 shows airspace disease with volume loss right upper lobe similar to prior exam, felt to reflect post treatment related change.   Denies any new symptoms, continues to do well with tx.    Objective     Objective     Allergy:   Allergies   Allergen Reactions    Paclitaxel Anaphylaxis        Current Medications:   Current Outpatient Medications   Medication Sig Dispense Refill    albuterol sulfate  (90 Base) MCG/ACT inhaler Inhale 2 puffs Every 4 (Four) Hours As Needed for Wheezing. 18 g 5    apixaban (ELIQUIS) 5 MG tablet tablet Take 1 tablet by mouth Every 12 (Twelve) Hours. 180 tablet 3    atorvastatin (LIPITOR) 20 MG tablet Take 1 tablet by mouth Daily.      Budeson-Glycopyrrol-Formoterol (Breztri Aerosphere) 160-9-4.8 MCG/ACT aerosol inhaler Inhale 2 puffs 2 (Two) Times a Day. 10.7 g 6    hydrOXYzine pamoate (VISTARIL) 25 MG capsule Take 1 capsule by mouth Every Night.      ipratropium-albuterol (DUO-NEB) 0.5-2.5 mg/3 ml nebulizer USE 1 VIAL IN NEBULIZER 4 TIMES DAILY - and as needed 320 mL 11    lidocaine-prilocaine (EMLA) 2.5-2.5 % cream Apply to port-a-cath site 30 minutes prior to arrival at infusion center. Cover with plastic wrap. 30 g 1    lisinopril (PRINIVIL,ZESTRIL) 5 MG tablet Take 1 tablet by mouth Daily. 90 tablet 3    metFORMIN (GLUCOPHAGE) 500 MG tablet Take 1 tablet by mouth 2 (Two) Times a Day As Needed (only takes if blood glucose is above 200). As needed      prochlorperazine (COMPAZINE) 10 MG tablet Take 1 tablet by mouth Every 6 (Six) Hours As Needed for Nausea or Vomiting. 30 tablet 1    spironolactone (ALDACTONE) 25 MG tablet Take 1 tablet by mouth Daily.       No current  facility-administered medications for this visit.     Facility-Administered Medications Ordered in Other Visits   Medication Dose Route Frequency Provider Last Rate Last Admin    heparin injection 500 Units  500 Units Intravenous PRN Bailee Gan APRN   500 Units at 01/26/24 1558    sodium chloride 0.9 % flush 10 mL  10 mL Intravenous PRN Bailee Gan APRN           Past Medical History:  Past Medical History:   Diagnosis Date    Arthritis     CHF (congestive heart failure)     Chronic anticoagulation     Eliquis    Chronic kidney disease     stage II/IIIa    Collapsed lung     COPD (chronic obstructive pulmonary disease)     Diabetes mellitus     TYPE 2    Elevated cholesterol     GERD (gastroesophageal reflux disease)     Hyperlipidemia     Hypertension     Non-small cell lung cancer RUL     Sleep apnea     non compliant with CPAP    Stroke 2019    Wears eyeglasses        Past Surgical History:  Past Surgical History:   Procedure Laterality Date    BRONCHOSCOPY Bilateral 02/27/2023    Procedure: BRONCHOSCOPY WITH ENDOBRONCHIAL ULTRASOUND;  Surgeon: Abdulkadir Peter MD;  Location: [facility] OR;  Service: Pulmonary;  Laterality: Bilateral;    BRONCHOSCOPY WITH ION ROBOTIC ASSIST N/A 9/6/2023    Procedure: BRONCHOSCOPY WITH ION ROBOT AND EBUS;  Surgeon: John Clemens MD;  Location:  [facility];  Service: Robotics - Pulmonary;  Laterality: N/A;  Ion cath #6 - 0032,  #6 - 0030, cath guide # 0077. EBUS scope removed with balloon intact.    CARDIAC CATHETERIZATION N/A 08/22/2017    Procedure: Left Heart Cath;  Surgeon: Jatinder Matias MD;  Location: [facility] CATH INVASIVE LOCATION;  Service:     CARDIAC CATHETERIZATION N/A 11/22/2021    Procedure: Left Heart Cath;  Surgeon: Tolu Steinberg MD;  Location: [facility] CATH INVASIVE LOCATION;  Service: Cardiology;  Laterality: N/A;    COLONOSCOPY      ENDOSCOPY      GALLBLADDER SURGERY      PORTACATH PLACEMENT N/A 9/29/2023    Procedure: INSERTION  OF [facility];  Surgeon: Petr Velásquez MD;  Location:  [facility] OR;  Service: General;  Laterality: N/A;    VENTRAL HERNIA REPAIR N/A 2020    Procedure: VENTRAL HERNIA REPAIR LAPAROSCOPIC WITH DAVINCI ROBOT;  Surgeon: Petr Velásquez MD;  Location:  [facility] OR;  Service: DaVinci;  Laterality: N/A;       Social History:  Social History     Socioeconomic History    Marital status: Single    Number of children: 6   Tobacco Use    Smoking status: Former     Current packs/day: 0.00     Average packs/day: 3.0 packs/day for 16.7 years (50.2 ttl pk-yrs)     Types: Cigarettes     Start date: 4/3/1998     Quit date: 2015     Years since quittin.6     Passive exposure: Past    Smokeless tobacco: Never   Vaping Use    Vaping status: Never Used   Substance and Sexual Activity    Alcohol use: No    Drug use: No    Sexual activity: Never         Family History:  Family History   Problem Relation Age of Onset    Heart disease Mother         Rhianna sophia    Heart disease Father     Heart attack Father     Heart failure Father        Review of Systems:  Review of Systems   Constitutional:  Positive for fatigue.   All other systems reviewed and are negative.      Vital Signs:   /73   Pulse 72   Temp 97.5 °F (36.4 °C) (Temporal)   Resp 18   Wt 91.6 kg (202 lb)   SpO2 92%   BMI 29.83 kg/m²      Physical Exam:  Physical Exam  Vitals reviewed.   Constitutional:       General: She is not in acute distress.     Appearance: Normal appearance. She is obese. She is not ill-appearing.      Comments: +hard of hearing   HENT:      Head: Normocephalic and atraumatic.      Mouth/Throat:      Mouth: Mucous membranes are moist.      Pharynx: Oropharynx is clear.   Eyes:      Conjunctiva/sclera: Conjunctivae normal.      Pupils: Pupils are equal, round, and reactive to light.   Cardiovascular:      Rate and Rhythm: Normal rate and regular rhythm.      Heart sounds: No murmur heard.  Pulmonary:      Effort: Pulmonary  effort is normal. No respiratory distress.      Breath sounds: Wheezing present.   Chest:      Comments: +R chest port  Abdominal:      General: Abdomen is flat. Bowel sounds are normal. There is no distension.      Palpations: Abdomen is soft. There is no mass.      Tenderness: There is no abdominal tenderness. There is no guarding.   Musculoskeletal:         General: No swelling. Normal range of motion.      Cervical back: Normal range of motion.   Lymphadenopathy:      Cervical: No cervical adenopathy.   Skin:     General: Skin is warm and dry.      Findings: Petechiae present.      Comments: LLE    Neurological:      General: No focal deficit present.      Mental Status: She is alert and oriented to person, place, and time. Mental status is at baseline.   Psychiatric:         Mood and Affect: Mood normal.         PHQ-9 Score  PHQ-9 Total Score:       Pain Score  Vitals:    08/15/24 1358   BP: 141/73   Pulse: 72   Resp: 18   Temp: 97.5 °F (36.4 °C)   TempSrc: Temporal   SpO2: 92%   Weight: 91.6 kg (202 lb)   PainSc: 0-No pain                                   PAINSCOREQUALITYMETRIC:   Vitals:    08/15/24 1358   PainSc: 0-No pain                          Lab Review  Lab Results   Component Value Date    WBC 7.09 08/15/2024    HGB 11.9 (L) 08/15/2024    HCT 38.9 08/15/2024    MCV 85.1 08/15/2024    RDW 13.9 08/15/2024     08/15/2024    NEUTRORELPCT 72.8 08/15/2024    LYMPHORELPCT 14.5 (L) 08/15/2024    MONORELPCT 8.2 08/15/2024    EOSRELPCT 3.1 08/15/2024    BASORELPCT 0.8 08/15/2024    NEUTROABS 5.16 08/15/2024    LYMPHSABS 1.03 08/15/2024     Lab Results   Component Value Date     08/15/2024    K 4.0 08/15/2024    CO2 25.7 08/15/2024     08/15/2024    BUN 17 08/15/2024    CREATININE 1.08 (H) 08/15/2024    EGFRIFNONA 51 (L) 11/22/2021    GLUCOSE 233 (H) 08/15/2024    CALCIUM 9.4 08/15/2024    ALKPHOS 111 08/15/2024    AST 13 08/15/2024    ALT 11 08/15/2024    BILITOT 0.2 08/15/2024    ALBUMIN  4.1 08/15/2024    PROTEINTOT 7.0 08/15/2024    MG 2.2 01/03/2024    PHOS 3.5 05/18/2020     Lab Results   Component Value Date    RETICCTPCT 4.26 (H) 08/16/2023     Lab Results   Component Value Date    FERRITIN 74.89 08/15/2024    IRON 64 08/15/2024    TIBC 407 08/15/2024    LABIRON 16 (L) 08/15/2024    OSRPEVNJ03 548 08/14/2023    FOLATE 12.90 08/14/2023        Radiology Results    CT Chest With Contrast Diagnostic (08/01/2024 14:14)     FINDINGS:  Soft tissue windows demonstrate no adenopathy within the chest. The  thyroid gland is unremarkable. The heart is normal in size. The aorta is  normal in caliber. There are no pleural or pericardial effusions. Lung  windows redemonstrate airspace disease in the right upper lobe with  volume loss felt to reflect posttreatment related change. Scarring is  present in the anterior left upper lobe and right middle lobe. There is  airspace disease in both lung bases similar in appearance to the prior  exam.. The visualized upper abdomen is unremarkable. Bone windows reveal  no lytic or destructive lesions.     IMPRESSION:  Airspace disease with volume loss in the right upper lobe  similar in appearance to the prior exam felt to reflect posttreatment  related change. Continued follow-up is recommended.      CT Chest With Contrast Diagnostic (05/01/2024 09:01)     IMPRESSION:  1. Development of airspace disease in the right upper lobe which is in  the region of a previously noted spiculated nodule likely reflecting  posttreatment related change.  2. Interval improvement in the previously noted bilateral lower lobe  airspace disease with a few patchy opacities remaining.    CT Chest Without Contrast Diagnostic (12/31/2023 20:30)   IMPRESSION:  Spiculated nodule of the right upper lobe concerning for neoplasm.  Masslike consolidation in the right lower lobe could represent pneumonia  with underlying mass not excluded. Atelectasis/airspace disease in the  left lower lobe and  lingula. Mediastinal adenopathy. Follow-up  recommended.    CT Chest With Contrast Diagnostic (12/19/2023 14:47)     IMPRESSION:  1.  Significant decrease size and masslike appearance of process in the  right upper lobe which would suggest significant response to treatment.  2.  No suspicious pulmonary nodules or masses identified.  3.  Mediastinal lymph nodes are now within normal limits for size.  No  hilar or mediastinal adenopathy.  4.  Mild centrilobular nodularity of the left lower lobe which may  reflect changes of atypical pneumonia or aspiration pneumonitis.  5.  Stable cardiomegaly.  6.  Other incidental/nonacute findings above.      CT Angiogram Chest Pulmonary Embolism (10/09/2023 16:41)   MPRESSION:  1.  Right upper lobe mass in association with pneumonia has increased in  size and extent when compared to the previous exam with extension to the  pleural surface. Masslike component is approximately 56.8 x 44.5 mm and  was previously 40.1 x 36.7 mm. This would correspond to primary  malignancy.  2.  Central bronchial wall thickening. Can be seen with reactive airway  disease or bronchitis.  3.  1.3 cm left adrenal nodule is noted. No significant change from  previous.  4.  Increased size of paratracheal and mediastinal lymph nodes. A right  paratracheal lymph node is 2.5 cm and was previously 2.2 cm.  5. No PE identified.  6. Marked cardiomegaly, stable.  7. Otherwise stable chest with other nonacute/incidental findings as  above.    CT Head Without Contrast (10/09/2023 12:00)   IMPRESSION:    Unremarkable exam demonstrating no CT evidence of acute intracranial  findings.      CT Chest Without Contrast Diagnostic (08/09/2023 12:38)       NM PET/CT Skull Base to Mid Thigh (06/20/2023 11:12)         CT Chest Low Dose Cancer Screening WO (02/14/2023 12:58)   IMPRESSION:    Interval development or enlargement of a 1.9 cm spiculated right upper  lobe pulmonary nodule concerning in appearance for malignancy and  PET/CT  is recommended for further evaluation.        Pathology:     Tissue Pathology Exam (09/06/2023 08:13)         6/29/23           Assessment / Plan         Assessment and Plan   Mayra Hays is a 79 y.o. year old presents for       Non small cell lung cancer   Cancer Staging   Stage IIIA (cT1c, cN2, cM0)  -Oncology history as above. Patient with 1.9 cm spiculated right upper lobe nodule on low dose CT chest screen (2/2023). EBUS by Dr. Peter negative for malignancy (2/2023). PET/CT with hypermetabolism RUL 2.1 cm and pretracheal region lymph node (6/2023). CT guided biopsy negative for malignancy (6/2023). Navigational bronchoscopy with positive RUL and  4R (paratracheal) for squamous cell (9/6/2023). PD-L1 TPS score 60%.   -Completed weekly carboplatin AUC2. Patient experienced anaphylaxis 8 minutes into the paclitaxel on first cycle 10/9. Due to this, paclitaxel will be omitted from weekly chemotherapy.  -Radiation therapy 10/10; 6000 cGy in 30 treatment fractions. Complete 11/21/2023  -Post treatment CT 12/19/23 with good/partial response  -Cont consolidation therapy with durvalumab every 2 weeks; C15/26 today. Repeat CT chest    2. Anemia; iron deficiency anemia due to GIB  - Patient with normal H/H in our system 2/2023, with acute drop in Hgb 6/29 (10.7) to Hgb (6.5) on 8/9 requiring transfusion. Patient reports one episode of melena two days ago (she is a poor historian).   -Last colonoscopy and endoscopy are unknown; think it may have been >5 years ago  -CBC from 8/14 show Hgb 8.2, Hct 27, MCV 90. Normal WBC and platelet count. Iron studies with normal iron (37), TIBC (435), and low iron saturation (9). This is in light of recent blood transfusion. Normal folate and B12 level. Reticulocyte count noted to be elevated to 6%  -Patient is with fatigue. Denies shortness of breath (aside from baseline COPD) or chest pain. Continues to be on Eliquis for Afib  -Initial work up from consultation confirmed iron  deficiency anemia, normal folate/b12 levels. No indication of hemolysis seen with normal LDH and haptoglobin. Myeloma work up has been negative with no M spike on serum protein electrophoresis and normal k/l free light chain ratio. Peripheral smear with normal total WBC without granulocytic dysplasia or blasts.  -Received Ferumoxytol 8/29/2023 and 11/27/23  -Pt to see surgery post treatment for repeat EGD and Colonoscopy     3. Malignancy associated pain  - Currently denies    4. Surveillance (per NCCN)  -Surveillance would include CT with and without contrast every 3 to 6 months for 3 years, then every 6 months for 2 years, then low-dose noncontrast CT annually.   Next CT 11/2024    5. Petechia of lower extremities   - Normal Plt counts  - Refer to dermatology     Discussed possible differential diagnoses, testing, treatment, recommended non-pharmacological interventions, risks, warning signs to monitor for that would indicate need for follow-up in clinic or ER. If no improvement with these regimens or you have new or worsening symptoms follow-up. Patient verbalizes understanding and agreement with plan of care. Denies further needs or concerns.     Patient was given instructions and counseling regarding her condition and for health maintenance advised.       All questions were answered to her satisfaction.              Meds ordered during this visit  No orders of the defined types were placed in this encounter.      Visit Diagnoses    ICD-10-CM ICD-9-CM   1. Non-small cell cancer of right lung  C34.91 162.9   2. Primary cancer of right upper lobe of lung  C34.11 162.3                           Follow Up   In 1 month with tx with CBC, iron studies, ferritin, CMP        This document has been electronically signed by Shawna Bond MD   August 15, 2024 19:16 EDT    Dictated Utilizing Dragon Dictation: Part of this note may be an electronic transcription/translation of spoken language to printed text using the  Dragon Dictation System.

## 2024-08-16 DIAGNOSIS — C34.11 PRIMARY CANCER OF RIGHT UPPER LOBE OF LUNG: ICD-10-CM

## 2024-08-16 DIAGNOSIS — D50.9 IRON DEFICIENCY ANEMIA, UNSPECIFIED IRON DEFICIENCY ANEMIA TYPE: ICD-10-CM

## 2024-08-16 DIAGNOSIS — C34.91 NON-SMALL CELL CANCER OF RIGHT LUNG: Primary | ICD-10-CM

## 2024-08-16 DIAGNOSIS — K90.49 MALABSORPTION DUE TO INTOLERANCE, NOT ELSEWHERE CLASSIFIED: ICD-10-CM

## 2024-08-21 ENCOUNTER — DOCUMENTATION (OUTPATIENT)
Dept: ONCOLOGY | Facility: CLINIC | Age: 79
End: 2024-08-21
Payer: MEDICARE

## 2024-08-23 ENCOUNTER — DOCUMENTATION (OUTPATIENT)
Dept: ONCOLOGY | Facility: CLINIC | Age: 79
End: 2024-08-23
Payer: MEDICARE

## 2024-08-26 ENCOUNTER — DOCUMENTATION (OUTPATIENT)
Dept: ONCOLOGY | Facility: CLINIC | Age: 79
End: 2024-08-26
Payer: MEDICARE

## 2024-08-27 ENCOUNTER — DOCUMENTATION (OUTPATIENT)
Dept: ONCOLOGY | Facility: CLINIC | Age: 79
End: 2024-08-27
Payer: MEDICARE

## 2024-08-29 ENCOUNTER — INFUSION (OUTPATIENT)
Dept: ONCOLOGY | Facility: HOSPITAL | Age: 79
End: 2024-08-29
Payer: MEDICARE

## 2024-08-29 ENCOUNTER — OFFICE VISIT (OUTPATIENT)
Dept: ONCOLOGY | Facility: CLINIC | Age: 79
End: 2024-08-29
Payer: MEDICARE

## 2024-08-29 ENCOUNTER — LAB (OUTPATIENT)
Dept: ONCOLOGY | Facility: HOSPITAL | Age: 79
End: 2024-08-29
Payer: MEDICARE

## 2024-08-29 VITALS
HEART RATE: 64 BPM | DIASTOLIC BLOOD PRESSURE: 62 MMHG | OXYGEN SATURATION: 92 % | SYSTOLIC BLOOD PRESSURE: 142 MMHG | TEMPERATURE: 97.5 F | RESPIRATION RATE: 18 BRPM

## 2024-08-29 VITALS
DIASTOLIC BLOOD PRESSURE: 62 MMHG | TEMPERATURE: 97.5 F | HEART RATE: 64 BPM | SYSTOLIC BLOOD PRESSURE: 142 MMHG | OXYGEN SATURATION: 92 % | RESPIRATION RATE: 18 BRPM

## 2024-08-29 DIAGNOSIS — C34.11 PRIMARY CANCER OF RIGHT UPPER LOBE OF LUNG: Primary | ICD-10-CM

## 2024-08-29 DIAGNOSIS — C34.91 NON-SMALL CELL CANCER OF RIGHT LUNG: ICD-10-CM

## 2024-08-29 DIAGNOSIS — Z95.828 PORT-A-CATH IN PLACE: ICD-10-CM

## 2024-08-29 DIAGNOSIS — R23.3 PETECHIAE: ICD-10-CM

## 2024-08-29 DIAGNOSIS — C34.11 PRIMARY CANCER OF RIGHT UPPER LOBE OF LUNG: ICD-10-CM

## 2024-08-29 DIAGNOSIS — D50.9 IRON DEFICIENCY ANEMIA, UNSPECIFIED IRON DEFICIENCY ANEMIA TYPE: ICD-10-CM

## 2024-08-29 DIAGNOSIS — R53.83 OTHER FATIGUE: ICD-10-CM

## 2024-08-29 DIAGNOSIS — C34.91 NON-SMALL CELL CANCER OF RIGHT LUNG: Primary | ICD-10-CM

## 2024-08-29 LAB
ALBUMIN SERPL-MCNC: 4.1 G/DL (ref 3.5–5.2)
ALBUMIN/GLOB SERPL: 1.2 G/DL
ALP SERPL-CCNC: 111 U/L (ref 39–117)
ALT SERPL W P-5'-P-CCNC: 10 U/L (ref 1–33)
ANION GAP SERPL CALCULATED.3IONS-SCNC: 10.7 MMOL/L (ref 5–15)
AST SERPL-CCNC: 17 U/L (ref 1–32)
BASOPHILS # BLD AUTO: 0.07 10*3/MM3 (ref 0–0.2)
BASOPHILS NFR BLD AUTO: 1.1 % (ref 0–1.5)
BILIRUB SERPL-MCNC: 0.2 MG/DL (ref 0–1.2)
BUN SERPL-MCNC: 24 MG/DL (ref 8–23)
BUN/CREAT SERPL: 23.1 (ref 7–25)
CALCIUM SPEC-SCNC: 9.4 MG/DL (ref 8.6–10.5)
CHLORIDE SERPL-SCNC: 105 MMOL/L (ref 98–107)
CO2 SERPL-SCNC: 27.3 MMOL/L (ref 22–29)
CREAT SERPL-MCNC: 1.04 MG/DL (ref 0.57–1)
DEPRECATED RDW RBC AUTO: 43 FL (ref 37–54)
EGFRCR SERPLBLD CKD-EPI 2021: 55.1 ML/MIN/1.73
EOSINOPHIL # BLD AUTO: 0.25 10*3/MM3 (ref 0–0.4)
EOSINOPHIL NFR BLD AUTO: 4 % (ref 0.3–6.2)
ERYTHROCYTE [DISTWIDTH] IN BLOOD BY AUTOMATED COUNT: 14 % (ref 12.3–15.4)
GLOBULIN UR ELPH-MCNC: 3.3 GM/DL
GLUCOSE SERPL-MCNC: 156 MG/DL (ref 65–99)
HCT VFR BLD AUTO: 37.1 % (ref 34–46.6)
HGB BLD-MCNC: 11.6 G/DL (ref 12–15.9)
IMM GRANULOCYTES # BLD AUTO: 0.04 10*3/MM3 (ref 0–0.05)
IMM GRANULOCYTES NFR BLD AUTO: 0.6 % (ref 0–0.5)
LYMPHOCYTES # BLD AUTO: 1.21 10*3/MM3 (ref 0.7–3.1)
LYMPHOCYTES NFR BLD AUTO: 19.2 % (ref 19.6–45.3)
MCH RBC QN AUTO: 26.4 PG (ref 26.6–33)
MCHC RBC AUTO-ENTMCNC: 31.3 G/DL (ref 31.5–35.7)
MCV RBC AUTO: 84.3 FL (ref 79–97)
MONOCYTES # BLD AUTO: 0.56 10*3/MM3 (ref 0.1–0.9)
MONOCYTES NFR BLD AUTO: 8.9 % (ref 5–12)
NEUTROPHILS NFR BLD AUTO: 4.17 10*3/MM3 (ref 1.7–7)
NEUTROPHILS NFR BLD AUTO: 66.2 % (ref 42.7–76)
NRBC BLD AUTO-RTO: 0 /100 WBC (ref 0–0.2)
PLATELET # BLD AUTO: 214 10*3/MM3 (ref 140–450)
PMV BLD AUTO: 10.6 FL (ref 6–12)
POTASSIUM SERPL-SCNC: 4.1 MMOL/L (ref 3.5–5.2)
PROT SERPL-MCNC: 7.4 G/DL (ref 6–8.5)
RBC # BLD AUTO: 4.4 10*6/MM3 (ref 3.77–5.28)
SODIUM SERPL-SCNC: 143 MMOL/L (ref 136–145)
T4 FREE SERPL-MCNC: 0.76 NG/DL (ref 0.92–1.68)
TSH SERPL DL<=0.05 MIU/L-ACNC: 3.15 UIU/ML (ref 0.27–4.2)
WBC NRBC COR # BLD AUTO: 6.3 10*3/MM3 (ref 3.4–10.8)

## 2024-08-29 PROCEDURE — 96413 CHEMO IV INFUSION 1 HR: CPT

## 2024-08-29 PROCEDURE — 25010000002 HEPARIN LOCK FLUSH PER 10 UNITS: Performed by: NURSE PRACTITIONER

## 2024-08-29 PROCEDURE — 3077F SYST BP >= 140 MM HG: CPT | Performed by: NURSE PRACTITIONER

## 2024-08-29 PROCEDURE — 1160F RVW MEDS BY RX/DR IN RCRD: CPT | Performed by: NURSE PRACTITIONER

## 2024-08-29 PROCEDURE — 99214 OFFICE O/P EST MOD 30 MIN: CPT | Performed by: NURSE PRACTITIONER

## 2024-08-29 PROCEDURE — 84443 ASSAY THYROID STIM HORMONE: CPT

## 2024-08-29 PROCEDURE — 1159F MED LIST DOCD IN RCRD: CPT | Performed by: NURSE PRACTITIONER

## 2024-08-29 PROCEDURE — 25010000002 DURVALUMAB 500 MG/10ML SOLUTION 10 ML VIAL: Performed by: INTERNAL MEDICINE

## 2024-08-29 PROCEDURE — 84439 ASSAY OF FREE THYROXINE: CPT

## 2024-08-29 PROCEDURE — 3078F DIAST BP <80 MM HG: CPT | Performed by: NURSE PRACTITIONER

## 2024-08-29 PROCEDURE — 1126F AMNT PAIN NOTED NONE PRSNT: CPT | Performed by: NURSE PRACTITIONER

## 2024-08-29 PROCEDURE — 25810000003 SODIUM CHLORIDE 0.9 % SOLUTION: Performed by: INTERNAL MEDICINE

## 2024-08-29 PROCEDURE — 85025 COMPLETE CBC W/AUTO DIFF WBC: CPT

## 2024-08-29 PROCEDURE — 80053 COMPREHEN METABOLIC PANEL: CPT

## 2024-08-29 PROCEDURE — 25810000003 SODIUM CHLORIDE 0.9 % SOLUTION 250 ML FLEX CONT: Performed by: INTERNAL MEDICINE

## 2024-08-29 RX ORDER — SODIUM CHLORIDE 0.9 % (FLUSH) 0.9 %
10 SYRINGE (ML) INJECTION AS NEEDED
Status: DISCONTINUED | OUTPATIENT
Start: 2024-08-29 | End: 2024-08-29 | Stop reason: HOSPADM

## 2024-08-29 RX ORDER — HEPARIN SODIUM (PORCINE) LOCK FLUSH IV SOLN 100 UNIT/ML 100 UNIT/ML
300 SOLUTION INTRAVENOUS ONCE
OUTPATIENT
Start: 2024-08-29

## 2024-08-29 RX ORDER — SODIUM CHLORIDE 9 MG/ML
250 INJECTION, SOLUTION INTRAVENOUS ONCE
Status: COMPLETED | OUTPATIENT
Start: 2024-08-29 | End: 2024-08-29

## 2024-08-29 RX ORDER — SODIUM CHLORIDE 0.9 % (FLUSH) 0.9 %
10 SYRINGE (ML) INJECTION AS NEEDED
OUTPATIENT
Start: 2024-08-29

## 2024-08-29 RX ORDER — SODIUM CHLORIDE 0.9 % (FLUSH) 0.9 %
20 SYRINGE (ML) INJECTION AS NEEDED
OUTPATIENT
Start: 2024-08-29

## 2024-08-29 RX ORDER — HEPARIN SODIUM (PORCINE) LOCK FLUSH IV SOLN 100 UNIT/ML 100 UNIT/ML
500 SOLUTION INTRAVENOUS AS NEEDED
Status: DISCONTINUED | OUTPATIENT
Start: 2024-08-29 | End: 2024-08-29 | Stop reason: HOSPADM

## 2024-08-29 RX ORDER — HEPARIN SODIUM (PORCINE) LOCK FLUSH IV SOLN 100 UNIT/ML 100 UNIT/ML
500 SOLUTION INTRAVENOUS AS NEEDED
OUTPATIENT
Start: 2024-08-29

## 2024-08-29 RX ADMIN — SODIUM CHLORIDE 1000 MG: 9 INJECTION, SOLUTION INTRAVENOUS at 15:55

## 2024-08-29 RX ADMIN — Medication 10 ML: at 17:09

## 2024-08-29 RX ADMIN — HEPARIN 500 UNITS: 100 SYRINGE at 17:09

## 2024-08-29 RX ADMIN — SODIUM CHLORIDE 250 ML: 9 INJECTION, SOLUTION INTRAVENOUS at 15:55

## 2024-08-29 NOTE — PROGRESS NOTES
Subjective     Date: 2024    Referring Provider  No ref. provider found    Chief Complaint  Symptomatic anemia   2. Non small Cell Lung Cancer  Cancer Staging   Stage IIIA (cT1c, cN2, cM0)        Subjective      Mayra Hays is a 79 y.o. female who presents today to [facility] for follow up.    HPI:   79 y.o. female with past medical history of diabetes mellitus type 2, hyperlipidemia, COPD, hypertension, atrial fibrillation on anticoagulation (Eliquis), h/o CVA and previous tobacco use presents for symptomatic anemia and NSCLC.    She is present today with her daughter, Hailey, who is assisting with history. Pt started work up for RUL lung nodule , since then noticed to have a decrease in her Hgb to 10.7 from normal in 2023. More recently, her Hgb 9 was 6.5, she was directed to ED where she received 1U PRBC.     Patient reports increased fatigue over the last several months. She reports no bleeding, however does report dark colored stool (melena) two days ago. Denies any other evidence of melena or hematochezia.     She has previously been diagnosed with iron deficiency (years ago) requiring oral iron supplements, but never received IV iron or bone marrow biopsy.    She and her daughter are unsure of her last colonoscopy and endoscopy history, think it may have occurred >5 years ago but unsure.    She denies recent weight loss, fever, chills,  night sweats.  Previous smoker, quit 5-6 years ago, smoked 3 packs/day X 30 years. Denies alcohol or drug use. Family history significant for son with leukemia, brothers with lung cancer.     Oncology History:  2023: low dose CT chest screenin.9 cm spiculated right upper lobe pulmonary nodule concerning for malignancy, PET/CT recommended.   2023: EBUS by Dr. Peter negative for malignancy for bronchial washing and FNA of station 10R  2023: PET/CT: Right upper lobe pulmonary nodule, more  posterior possible satellite nodules are postobstructive process measuring 2.1 cm with mSUV 14.5. Also with pretracheal region lymph node 2.6 cm.   6/29/2023: CT guided needle biopsy was non diagnostic, showed fibrosis and chronic inflammation.   8/9/2023: Lab work showed anemia with Hgb 6.5. Referred to ED. Bronchoscopy deferred.   9/6/2023: Navigational bronchoscopy performed by Dr. Clemens- Right upper lobe lung transbronchial biopsy revealed squamous cell carcinoma, lymph node at station 4R also involved with squamous cell carcinoma. PD-L1 TPS 60%. Patient not deemed a surgical candidate.  9/13-11/21/2023: Received weekly carboplatin  X 7 and radiation. Patient had a reaction to paclitaxel, it was omitted.   12/19/2023: Post treatment CT chest with improvement of masslike process RUL now 1.6 cm, suggest significant response to treatment. Mediastinal lymph nodes are now within normal limits of size.     Ms. Hays presents today with her daughter, grand daughter, and great grand daughters. She is in a wheelchair. She lives alone with her grand son, able to perform her ADLs including cooking, cleaning.     Treatment history:        2.       Interval History 10/11/2023   Ms. Hays presents for follow up today, accompanied by her daughter in law. She started treatment with paclitaxel and carboplatin on 10/9, unfortunately had a anaphylactic reaction to taxol after 8 minutes of infusion causing her to go to the ED. Patient was awake and feeling back to herself at the time of ED visit. Carboplatin was not administered.     She reports doing well overall, no new symptoms. Started radiation therapy yesterday.      Interval History 10/24/2023   Ms. Hays presents for cycle 2 of carboplatin with concurrent radiation. Did well with first cycle. Denies any adverse effects including nausea, vomiting, diarrhea, neuropathy. Appetite is good. Radiation is going well. No complaints today.  Denies any skin rash or  bites.    Interval History 11/21/2023   Ms. Hays presents today for cycle 7 of carboplatin with concurrent radiation. She reports good tolerance thus far without neuropathy, nausea, vomiting, diarrhea. Appetite is stable.   Denies any GIB.    Interval History 01/11/2024   Ms. Hays presents after completion of therapy. She was admitted to the hospital 12/31 due to shortness of breath, found to have Afib with RVR and coronavirus HKU. She received inpatient antibiotics and steroids. Currently with improvement, still reports some fatigue.   We reviewed her last CT chest, which shows improvement after treatment. Discussed continuing immunotherapy.      Interval History 02/22/2024   Ms. Hays presents today for follow, prior to cycle 3 of maintenance durvalumab. Accompanied by her grand daughter's fiance. She reports improved energy. Denies any bleeding, nausea, vomiting, diarrhea, rash. On examination, noted to have petechia on bilateral distal lower extremities and R distal upper extremities, she believes these started today. Denies pruritus.     Interval History 03/21/2024    presents prior to C5 of consolidation therapy with durvalumab. She reports good tolerance to the last treatment, denies shortness of breath, diarrhea, nausea/vomiting, fatigue. She's been applying lotion to her legs, petechia has decreased, primarily just on her left leg now.     Interval History 04/18/2024   Ms. Hays presents prior to C7 of durvalumab. Reports good tolerance without nausea, vomiting, diarrhea. No changes in petechia. Denies any other complaints.    Interval History 06/13/2024   Ms. Hays presents for follow up prior to cycle 11 of durvalumab. She presented to ED 6/6 due to shortness of breath and cough. CXR showed RUL and left basilar airspace disease for which she received levofloxacin. Today with improvement in symptoms. Denies diarrhea, nausea, vomiting.     Interval History 07/18/2024   Ms. Hays presents for  follow-up, prior to cycle 13 of consolidation Durvalumab. She reports good tolerance, denies any cough/shortness of breath, NVD. Continues to have non pruritic petechia on her legs.     Interval History 08/15/2024   Ms. Hays presents for follow up, prior to C 15 of Durvalumab. Surveillance CT chest 8/1 shows airspace disease with volume loss right upper lobe similar to prior exam, felt to reflect post treatment related change.   Denies any new symptoms, continues to do well with tx.    Interval History 08/29/2024  Ms. Hays presents today for follow up, prior to cycle 16 Durvalumab. Overall, she reports that she has been doing well since her last visit. She continues to tolerate the treatments well and without any noticeable side effects. She reports a good appetite, hydrating well. Denies any nausea/vomiting or diarrhea. No rash. She is without any specific complaints at this time.     Objective     Objective     Allergy:   Allergies   Allergen Reactions    Paclitaxel Anaphylaxis        Current Medications:   Current Outpatient Medications   Medication Sig Dispense Refill    albuterol sulfate  (90 Base) MCG/ACT inhaler Inhale 2 puffs Every 4 (Four) Hours As Needed for Wheezing. 18 g 5    apixaban (ELIQUIS) 5 MG tablet tablet Take 1 tablet by mouth Every 12 (Twelve) Hours. 180 tablet 3    atorvastatin (LIPITOR) 20 MG tablet Take 1 tablet by mouth Daily.      Budeson-Glycopyrrol-Formoterol (Breztri Aerosphere) 160-9-4.8 MCG/ACT aerosol inhaler Inhale 2 puffs 2 (Two) Times a Day. 10.7 g 6    hydrOXYzine pamoate (VISTARIL) 25 MG capsule Take 1 capsule by mouth Every Night.      ipratropium-albuterol (DUO-NEB) 0.5-2.5 mg/3 ml nebulizer USE 1 VIAL IN NEBULIZER 4 TIMES DAILY - and as needed 320 mL 11    lidocaine-prilocaine (EMLA) 2.5-2.5 % cream Apply to port-a-cath site 30 minutes prior to arrival at infusion center. Cover with plastic wrap. 30 g 1    lisinopril (PRINIVIL,ZESTRIL) 5 MG tablet Take 1 tablet by  mouth Daily. 90 tablet 3    metFORMIN (GLUCOPHAGE) 500 MG tablet Take 1 tablet by mouth 2 (Two) Times a Day As Needed (only takes if blood glucose is above 200). As needed      prochlorperazine (COMPAZINE) 10 MG tablet Take 1 tablet by mouth Every 6 (Six) Hours As Needed for Nausea or Vomiting. 30 tablet 1    spironolactone (ALDACTONE) 25 MG tablet Take 1 tablet by mouth Daily.       No current facility-administered medications for this visit.     Facility-Administered Medications Ordered in Other Visits   Medication Dose Route Frequency Provider Last Rate Last Admin    Durvalumab 1,000 mg in sodium chloride 0.9 % 270 mL chemo IVPB  1,000 mg Intravenous Once Shawna Bond MD        heparin injection 500 Units  500 Units Intravenous PRN GilbertArturol Sharmaine, APRN   500 Units at 01/26/24 1558    heparin injection 500 Units  500 Units Intravenous PRN Gilbert, Bailee Sharmaine, APRN        sodium chloride 0.9 % flush 10 mL  10 mL Intravenous PRN Gilbert, Bailee Sharmaine, APRN        sodium chloride 0.9 % flush 10 mL  10 mL Intravenous PRN Gilbert Bailee Sharmaien, APRN        sodium chloride 0.9 % infusion 250 mL  250 mL Intravenous Once Shawna Bond MD           Past Medical History:  Past Medical History:   Diagnosis Date    Arthritis     CHF (congestive heart failure)     Chronic anticoagulation     Eliquis    Chronic kidney disease     stage II/IIIa    Collapsed lung     COPD (chronic obstructive pulmonary disease)     Diabetes mellitus     TYPE 2    Elevated cholesterol     GERD (gastroesophageal reflux disease)     Hyperlipidemia     Hypertension     Non-small cell lung cancer RUL     Sleep apnea     non compliant with CPAP    Stroke 2019    Wears eyeglasses        Past Surgical History:  Past Surgical History:   Procedure Laterality Date    BRONCHOSCOPY Bilateral 02/27/2023    Procedure: BRONCHOSCOPY WITH ENDOBRONCHIAL ULTRASOUND;  Surgeon: Abdulkadir Peter MD;  Location: [facility];  Service: Pulmonary;  Laterality:  Bilateral;    BRONCHOSCOPY WITH ION ROBOTIC ASSIST N/A 2023    Procedure: BRONCHOSCOPY WITH ION ROBOT AND EBUS;  Surgeon: John Clemens MD;  Location:  [facility];  Service: Robotics - Pulmonary;  Laterality: N/A;  Ion cath #6 - 0032,  #6 - 0030, cath guide # 0077. EBUS scope removed with balloon intact.    CARDIAC CATHETERIZATION N/A 2017    Procedure: Left Heart Cath;  Surgeon: Jatinder Matias MD;  [facility]:  [facility];  Service:     CARDIAC CATHETERIZATION N/A 2021    Procedure: Left Heart Cath;  Surgeon: Tolu Steinberg MD;  [facility];  Service: Cardiology;  Laterality: N/A;    COLONOSCOPY      ENDOSCOPY      GALLBLADDER SURGERY      PORTACATH PLACEMENT N/A 2023    Procedure: INSERTION OF PORTACATH;  Surgeon: Petr Velásquez MD;  Location:  [facility];  Service: General;  Laterality: N/A;    VENTRAL HERNIA REPAIR N/A 2020    Procedure: VENTRAL HERNIA REPAIR LAPAROSCOPIC WITH DAVINCI ROBOT;  Surgeon: Petr Velásquez MD;  Location:  [facility];  Service: DaVinci;  Laterality: N/A;       Social History:  Social History     Socioeconomic History    Marital status: Single    Number of children: 6   Tobacco Use    Smoking status: Former     Current packs/day: 0.00     Average packs/day: 3.0 packs/day for 16.7 years (50.2 ttl pk-yrs)     Types: Cigarettes     Start date: 4/3/1998     Quit date: 2015     Years since quittin.6     Passive exposure: Past    Smokeless tobacco: Never   Vaping Use    Vaping status: Never Used   Substance and Sexual Activity    Alcohol use: No    Drug use: No    Sexual activity: Never         Family History:  Family History   Problem Relation Age of Onset    Heart disease Mother         Rhianna sophia    Heart disease Father     Heart attack Father     Heart failure Father        Review of Systems:  Review of Systems   Constitutional:  Positive for fatigue.   All other systems reviewed  and are negative.      Vital Signs:   /62   Pulse 64   Temp 97.5 °F (36.4 °C) (Temporal)   Resp 18   SpO2 92%      Physical Exam:  Physical Exam  Vitals reviewed.   Constitutional:       General: She is not in acute distress.     Appearance: Normal appearance. She is obese. She is not ill-appearing.      Comments: +hard of hearing   HENT:      Head: Normocephalic and atraumatic.      Mouth/Throat:      Mouth: Mucous membranes are moist.      Pharynx: Oropharynx is clear.   Eyes:      Conjunctiva/sclera: Conjunctivae normal.      Pupils: Pupils are equal, round, and reactive to light.   Cardiovascular:      Rate and Rhythm: Normal rate and regular rhythm.      Heart sounds: No murmur heard.  Pulmonary:      Effort: Pulmonary effort is normal. No respiratory distress.      Breath sounds: Wheezing present.   Chest:      Comments: +R chest port  Abdominal:      General: Abdomen is flat. Bowel sounds are normal. There is no distension.      Palpations: Abdomen is soft. There is no mass.      Tenderness: There is no abdominal tenderness. There is no guarding.   Musculoskeletal:         General: No swelling. Normal range of motion.      Cervical back: Normal range of motion.   Lymphadenopathy:      Cervical: No cervical adenopathy.   Skin:     General: Skin is warm and dry.      Findings: Petechiae present.      Comments: LLE    Neurological:      General: No focal deficit present.      Mental Status: She is alert and oriented to person, place, and time. Mental status is at baseline.   Psychiatric:         Mood and Affect: Mood normal.         PHQ-9 Score  PHQ-9 Total Score:       Pain Score  Vitals:    08/29/24 1411   BP: 142/62   Pulse: 64   Resp: 18   Temp: 97.5 °F (36.4 °C)   TempSrc: Temporal   SpO2: 92%   PainSc: 0-No pain                                   PAINSCOREQUALITYMETRIC:   Vitals:    08/29/24 1411   PainSc: 0-No pain                          Lab Review  Lab Results   Component Value Date    WBC  6.30 08/29/2024    HGB 11.6 (L) 08/29/2024    HCT 37.1 08/29/2024    MCV 84.3 08/29/2024    RDW 14.0 08/29/2024     08/29/2024    NEUTRORELPCT 66.2 08/29/2024    LYMPHORELPCT 19.2 (L) 08/29/2024    MONORELPCT 8.9 08/29/2024    EOSRELPCT 4.0 08/29/2024    BASORELPCT 1.1 08/29/2024    NEUTROABS 4.17 08/29/2024    LYMPHSABS 1.21 08/29/2024     Lab Results   Component Value Date     08/29/2024    K 4.1 08/29/2024    CO2 27.3 08/29/2024     08/29/2024    BUN 24 (H) 08/29/2024    CREATININE 1.04 (H) 08/29/2024    EGFRIFNONA 51 (L) 11/22/2021    GLUCOSE 156 (H) 08/29/2024    CALCIUM 9.4 08/29/2024    ALKPHOS 111 08/29/2024    AST 17 08/29/2024    ALT 10 08/29/2024    BILITOT 0.2 08/29/2024    ALBUMIN 4.1 08/29/2024    PROTEINTOT 7.4 08/29/2024    MG 2.2 01/03/2024    PHOS 3.5 05/18/2020     Lab Results   Component Value Date    RETICCTPCT 4.26 (H) 08/16/2023     Lab Results   Component Value Date    FERRITIN 74.89 08/15/2024    IRON 64 08/15/2024    TIBC 407 08/15/2024    LABIRON 16 (L) 08/15/2024    [id number] 548 08/14/2023    FOLATE 12.90 08/14/2023        Radiology Results    CT Chest With Contrast Diagnostic (08/01/2024 14:14)     FINDINGS:  Soft tissue windows demonstrate no adenopathy within the chest. The  thyroid gland is unremarkable. The heart is normal in size. The aorta is  normal in caliber. There are no pleural or pericardial effusions. Lung  windows redemonstrate airspace disease in the right upper lobe with  volume loss felt to reflect posttreatment related change. Scarring is  present in the anterior left upper lobe and right middle lobe. There is  airspace disease in both lung bases similar in appearance to the prior  exam.. The visualized upper abdomen is unremarkable. Bone windows reveal  no lytic or destructive lesions.     IMPRESSION:  Airspace disease with volume loss in the right upper lobe  similar in appearance to the prior exam felt to reflect posttreatment  related change.  Continued follow-up is recommended.      CT Chest With Contrast Diagnostic (05/01/2024 09:01)     IMPRESSION:  1. Development of airspace disease in the right upper lobe which is in  the region of a previously noted spiculated nodule likely reflecting  posttreatment related change.  2. Interval improvement in the previously noted bilateral lower lobe  airspace disease with a few patchy opacities remaining.    CT Chest Without Contrast Diagnostic (12/31/2023 20:30)   IMPRESSION:  Spiculated nodule of the right upper lobe concerning for neoplasm.  Masslike consolidation in the right lower lobe could represent pneumonia  with underlying mass not excluded. Atelectasis/airspace disease in the  left lower lobe and lingula. Mediastinal adenopathy. Follow-up  recommended.    CT Chest With Contrast Diagnostic (12/19/2023 14:47)     IMPRESSION:  1.  Significant decrease size and masslike appearance of process in the  right upper lobe which would suggest significant response to treatment.  2.  No suspicious pulmonary nodules or masses identified.  3.  Mediastinal lymph nodes are now within normal limits for size.  No  hilar or mediastinal adenopathy.  4.  Mild centrilobular nodularity of the left lower lobe which may  reflect changes of atypical pneumonia or aspiration pneumonitis.  5.  Stable cardiomegaly.  6.  Other incidental/nonacute findings above.      CT Angiogram Chest Pulmonary Embolism (10/09/2023 16:41)   MPRESSION:  1.  Right upper lobe mass in association with pneumonia has increased in  size and extent when compared to the previous exam with extension to the  pleural surface. Masslike component is approximately 56.8 x 44.5 mm and  was previously 40.1 x 36.7 mm. This would correspond to primary  malignancy.  2.  Central bronchial wall thickening. Can be seen with reactive airway  disease or bronchitis.  3.  1.3 cm left adrenal nodule is noted. No significant change from  previous.  4.  Increased size of  paratracheal and mediastinal lymph nodes. A right  paratracheal lymph node is 2.5 cm and was previously 2.2 cm.  5. No PE identified.  6. Marked cardiomegaly, stable.  7. Otherwise stable chest with other nonacute/incidental findings as  above.    CT Head Without Contrast (10/09/2023 12:00)   IMPRESSION:    Unremarkable exam demonstrating no CT evidence of acute intracranial  findings.      CT Chest Without Contrast Diagnostic (08/09/2023 12:38)       NM PET/CT Skull Base to Mid Thigh (06/20/2023 11:12)         CT Chest Low Dose Cancer Screening WO (02/14/2023 12:58)   IMPRESSION:    Interval development or enlargement of a 1.9 cm spiculated right upper  lobe pulmonary nodule concerning in appearance for malignancy and PET/CT  is recommended for further evaluation.        Pathology:     Tissue Pathology Exam (09/06/2023 08:13)         6/29/23           Assessment / Plan         Assessment and Plan   Mayra Hays is a 79 y.o. year old presents for       Non small cell lung cancer   Cancer Staging   Stage IIIA (cT1c, cN2, cM0)  -Oncology history as above. Patient with 1.9 cm spiculated right upper lobe nodule on low dose CT chest screen (2/2023). EBUS by Dr. Peter negative for malignancy (2/2023). PET/CT with hypermetabolism RUL 2.1 cm and pretracheal region lymph node (6/2023). CT guided biopsy negative for malignancy (6/2023). Navigational bronchoscopy with positive RUL and  4R (paratracheal) for squamous cell (9/6/2023). PD-L1 TPS score 60%.   -Completed weekly carboplatin AUC2. Patient experienced anaphylaxis 8 minutes into the paclitaxel on first cycle 10/9. Due to this, paclitaxel will be omitted from weekly chemotherapy.  -Radiation therapy 10/10; 6000 cGy in 30 treatment fractions. Complete 11/21/2023  -Post treatment CT 12/19/23 with good/partial response  -Cont consolidation therapy with durvalumab every 2 weeks; C16/26 today. Tentatively plan to repeat CT chest in November 2024.    2. Anemia; iron  deficiency anemia due to GIB  - Patient with normal H/H in our system 2/2023, with acute drop in Hgb 6/29 (10.7) to Hgb (6.5) on 8/9 requiring transfusion. Patient reports one episode of melena two days ago (she is a poor historian).   -Last colonoscopy and endoscopy are unknown; think it may have been >5 years ago  -CBC from 8/14 show Hgb 8.2, Hct 27, MCV 90. Normal WBC and platelet count. Iron studies with normal iron (37), TIBC (435), and low iron saturation (9). This is in light of recent blood transfusion. Normal folate and B12 level. Reticulocyte count noted to be elevated to 6%  -Patient is with fatigue. Denies shortness of breath (aside from baseline COPD) or chest pain. Continues to be on Eliquis for Afib  -Initial work up from consultation confirmed iron deficiency anemia, normal folate/b12 levels. No indication of hemolysis seen with normal LDH and haptoglobin. Myeloma work up has been negative with no M spike on serum protein electrophoresis and normal k/l free light chain ratio. Peripheral smear with normal total WBC without granulocytic dysplasia or blasts.  -Received Ferumoxytol 8/29/2023 and 11/27/23  -Pt to see surgery post treatment for repeat EGD and Colonoscopy     3. Malignancy associated pain  - Currently denies    4. Surveillance (per NCCN)  -Surveillance would include CT with and without contrast every 3 to 6 months for 3 years, then every 6 months for 2 years, then low-dose noncontrast CT annually.   Next CT 11/2024    5. Petechia of lower extremities   - Normal Plt counts  - Refer to dermatology     Discussed possible differential diagnoses, testing, treatment, recommended non-pharmacological interventions, risks, warning signs to monitor for that would indicate need for follow-up in clinic or ER. If no improvement with these regimens or you have new or worsening symptoms follow-up. Patient verbalizes understanding and agreement with plan of care. Denies further needs or concerns.      Patient was given instructions and counseling regarding her condition and for health maintenance advised.       All questions were answered to her satisfaction.              Meds ordered during this visit  No orders of the defined types were placed in this encounter.      Visit Diagnoses    ICD-10-CM ICD-9-CM   1. Non-small cell cancer of right lung  C34.91 162.9   2. Iron deficiency anemia, unspecified iron deficiency anemia type  D50.9 280.9   3. Petechiae  R23.3 782.7   4. Other fatigue  R53.83 780.79                             Follow Up   As scheduled with Dr. Bond on day of cycle 17 Durvalumab.        This document has been electronically signed by ERASTO Lyons   August 29, 2024 15:46 EDT

## 2024-09-03 ENCOUNTER — HOSPITAL ENCOUNTER (OUTPATIENT)
Dept: PULMONOLOGY | Facility: HOSPITAL | Age: 79
Discharge: HOME | End: 2024-09-03
Admitting: PHYSICIAN ASSISTANT
Payer: MEDICARE

## 2024-09-03 VITALS
WEIGHT: 209 LBS | BODY MASS INDEX: 30.86 KG/M2 | HEART RATE: 78 BPM | OXYGEN SATURATION: 90 % | SYSTOLIC BLOOD PRESSURE: 163 MMHG | DIASTOLIC BLOOD PRESSURE: 67 MMHG

## 2024-09-03 DIAGNOSIS — C34.91 NON-SMALL CELL CANCER OF RIGHT LUNG: ICD-10-CM

## 2024-09-03 DIAGNOSIS — J44.9 CHRONIC OBSTRUCTIVE PULMONARY DISEASE, UNSPECIFIED COPD TYPE: Primary | ICD-10-CM

## 2024-09-03 DIAGNOSIS — J96.11 CHRONIC RESPIRATORY FAILURE WITH HYPOXIA: ICD-10-CM

## 2024-09-03 DIAGNOSIS — Z87.891 FORMER SMOKER: ICD-10-CM

## 2024-09-03 PROCEDURE — G0463 HOSPITAL OUTPT CLINIC VISIT: HCPCS | Performed by: PHYSICIAN ASSISTANT

## 2024-09-03 PROCEDURE — 99214 OFFICE O/P EST MOD 30 MIN: CPT | Performed by: PHYSICIAN ASSISTANT

## 2024-09-03 RX ORDER — IPRATROPIUM BROMIDE AND ALBUTEROL SULFATE 2.5; .5 MG/3ML; MG/3ML
3 SOLUTION RESPIRATORY (INHALATION) EVERY 4 HOURS PRN
Qty: 320 ML | Refills: 3 | Status: SHIPPED | OUTPATIENT
Start: 2024-09-03

## 2024-09-03 RX ORDER — BUDESONIDE, GLYCOPYRROLATE, AND FORMOTEROL FUMARATE 160; 9; 4.8 UG/1; UG/1; UG/1
2 AEROSOL, METERED RESPIRATORY (INHALATION) 2 TIMES DAILY
Qty: 10.7 G | Refills: 6 | Status: SHIPPED | OUTPATIENT
Start: 2024-09-03

## 2024-09-03 RX ORDER — ALBUTEROL SULFATE 90 UG/1
2 AEROSOL, METERED RESPIRATORY (INHALATION) EVERY 4 HOURS PRN
Qty: 18 G | Refills: 6 | Status: SHIPPED | OUTPATIENT
Start: 2024-09-03

## 2024-09-03 NOTE — PROGRESS NOTES
"COPD Education    NAME:___Mayra Hays  :_1945_  APPOINTMENT DATE/TIME:___________24___14:05 EDT___________    COPD Education Evaluation            COPD Education Completed (See Note) Yes     COPD Clinic encounter: 2nd    Referring/consulting Provider: Dr. Venegas     Reason for Consultation:  COPD Exacerbation   COPD Diagnosis Length 3-4 years     Current Outpatient Pulmonologist     YES and BH           Subjective            Spirometry      Spirometry Completed YES and 24, during Pulmonology office visit.     FVC 33%     Fev1 37%     Fev1/FVC 83     GOLD Stage Unable to stage for COPD per GOLD guidelines per FEV1/FVC greater than 70.           Classification of Airflow Limitation Severity in COPD (Based on Post-Bronchodilator FEV1)           Gold 1: Mild FEV1 ? 80% predicted      Gold 2:  Moderate 50% ? FEV1 < 80% predicted   Gold 3: Severe 30% ? FEV1 < 50% predicted   Gold 4: Very Severe FEV1 < 30% predicted            Dyspnea:        Do you have Dyspnea? Yes and With Exertion.       DME Equipment Used:              Home O2? YES     Home O2 device? Home Oxygen concentrator  Portable Oxygen Tanks     Nebulizer YES     NIPPV None      Objective:      Barriers to Learning? No    Discussed:             COPD education given via the booklet \"A Patient's Guide to COPD\".     COPD Zones: (Green/yellow/Red): YES     Exacerbation or Flare up signs and symptoms: YES       COPD Medication Non-Compliance YES       Managing Medications: YES     Patient understands use of Rescue Medications: YES     Type of Rescue inhaler patient currently has: Albuterol and Duoneb nebulizer treatment   Patient understands use of Maintenance Medications: YES     Type of Maintenance inhaler patient currently has: Patient currently does not have her prescribed Breztri. Her breztri script will be changed to [facility] and mailed to the patient monthly according to Nacho Carlisle.     Proper MDI technique (w/wo Spacer): YES   "   Provided patient a Spacer: YES       Smoking Cessation information offered to patient: NO     Nicotine Replacement Therapy Discussed NO     Breathing Techniques:            Purse-lipped breathing YES     Oxygen therapy SAFETY:  YES           Action Plan            Influenza or Pneumonia Vaccine ordered: Influenza,Pneumonia,Both     Aerobika for sputum mobilization: NO     Rescue Inhaler ordered: YES     COPD Maintenance Inhaler ordered: YES     [facility] follow up scheduled: YES and 3/4/2024     Education Minutes with patient: YES and 25 minutes       Goals Discussed with patient: Keep home smoke free., Take medications as ordered., GO to Dr. appointments., Increase activity., Eat healthier., Increase use of pursed lip breathing., Decrease flare-ups., and Increase COPD Knowledge.    Comments: Ms. Hays returns to the COPD Clinic for a follow up visit. She was previously prescribed Breztri for COPD Maintenance but she has not been getting her prescription filled at her pharmacy. She is however using her Albuterol MDI and her Duoneb nebulizer treatments. Jocelyn Mendoza PA-C, to reorder her inhaler scripts to [facility] so her inhalers can be mailed to her.    During her last visit, she did watch a COPD educational video and complete a 6MWT. The 6MWT report is listed below.          She has portable O2 tanks at home and a home oxygen concentrator.  Her Alpha 1 Genotype has not resulted, she was re-tested today.    Today we discussed COPD Education, especially the importance of proper inhaler technique and using a maintenance inhaler daily.    She will return to the COPD Clinic in 6 months.      CHELSI Delgado, RRT  COPD Navigator  09/03/24  14:05 EDT

## 2024-09-03 NOTE — PROGRESS NOTES
Subjective      Chief Complaint  COPD    Subjective      History of Present Illness  Mayra Hays is a 79 y.o. female who presents today to [facility] with past medical history of chronic HFpEF, essential hypertension, hyperlipidemia, paroxysmal atrial fibrillation, CKD, type II diabetes mellitus, iron deficiency anemia, non-small cell lung cancer of right lung on chemotherapy, COPD, chronic hypoxic respiratory failure, and tobacco abuse who presents today for COPD. This visit is a follow up appointment.     COPD:  Patient's family present during visit. Patient reports that she is doing well and feels that her breathing is currently stable. She states that she has been using her Breztri inhaler 2 puffs twice daily. When confirming with pharmacy, the inhaler was last filled on 07/31/2024. She also has an albuterol inhaler and DuoNeb treatments to use as needed. She has supplemental oxygen which she utilizes as needed. She continues to follow closely with oncology for treatment of non-small cell lung cancer of left lung and is currently on maintenance chemotherapy with Durvalumab.         Current Outpatient Medications:     albuterol sulfate  (90 Base) MCG/ACT inhaler, Inhale 2 puffs by mouth Every 4 (Four) Hours As Needed for Wheezing., Disp: 18 g, Rfl: 6    apixaban (ELIQUIS) 5 MG tablet tablet, Take 1 tablet by mouth Every 12 (Twelve) Hours., Disp: 180 tablet, Rfl: 3    atorvastatin (LIPITOR) 20 MG tablet, Take 1 tablet by mouth Daily., Disp: , Rfl:     Budeson-Glycopyrrol-Formoterol (Breztri Aerosphere) 160-9-4.8 MCG/ACT aerosol inhaler, Inhale 2 puffs by mouth 2 (Two) Times a Day., Disp: 10.7 g, Rfl: 6    esomeprazole (nexIUM) 20 MG capsule, Take 1 capsule by mouth Daily., Disp: , Rfl:     hydrOXYzine pamoate (VISTARIL) 25 MG capsule, Take 1 capsule by mouth Every Night., Disp: , Rfl:     ipratropium-albuterol (DUO-NEB) 0.5-2.5 mg/3 ml nebulizer, Take 3 mL by nebulization  "Every 4 (Four) Hours As Needed for Wheezing or Shortness of Air., Disp: 320 mL, Rfl: 3    lisinopril (PRINIVIL,ZESTRIL) 5 MG tablet, Take 1 tablet by mouth Daily., Disp: 90 tablet, Rfl: 3    metFORMIN (GLUCOPHAGE) 500 MG tablet, Take 1 tablet by mouth 2 (Two) Times a Day As Needed (only takes if blood glucose is above 200). As needed, Disp: , Rfl:     prochlorperazine (COMPAZINE) 10 MG tablet, Take 1 tablet by mouth Every 6 (Six) Hours As Needed for Nausea or Vomiting., Disp: 30 tablet, Rfl: 1    spironolactone (ALDACTONE) 25 MG tablet, Take 1 tablet by mouth Daily., Disp: , Rfl:     lidocaine-prilocaine (EMLA) 2.5-2.5 % cream, Apply to port-a-cath site 30 minutes prior to arrival at infusion center. Cover with plastic wrap. (Patient not taking: Reported on 9/3/2024), Disp: 30 g, Rfl: 1  No current facility-administered medications for this encounter.    Facility-Administered Medications Ordered in Other Encounters:     heparin injection 500 Units, 500 Units, Intravenous, PRN, Malik, Bailee Sharmaine, APRN, 500 Units at 01/26/24 1558    sodium chloride 0.9 % flush 10 mL, 10 mL, Intravenous, PRN, Gilrose, Bailee Sharmaine, APRN      Allergies   Allergen Reactions    Paclitaxel Anaphylaxis       Objective     Objective   Vital Signs:  /67   Pulse 78   Wt 94.8 kg (209 lb)   SpO2 90%   BMI 30.86 kg/m²   Estimated body mass index is 30.86 kg/m² as calculated from the following:    Height as of 8/7/24: 175.3 cm (69\").    Weight as of this encounter: 94.8 kg (209 lb).    Past Medical History:   Diagnosis Date    Arthritis     CHF (congestive heart failure)     Chronic anticoagulation     Eliquis    Chronic kidney disease     stage II/IIIa    Collapsed lung     COPD (chronic obstructive pulmonary disease)     Diabetes mellitus     TYPE 2    Elevated cholesterol     GERD (gastroesophageal reflux disease)     Hyperlipidemia     Hypertension     Non-small cell lung cancer RUL     Sleep apnea     non compliant with CPAP    " Stroke 2019    Wears eyeglasses      Past Surgical History:   Procedure Laterality Date    BRONCHOSCOPY Bilateral 2023    Procedure: BRONCHOSCOPY WITH ENDOBRONCHIAL ULTRASOUND;  Surgeon: Abdulkadir Peter MD;  Location:  [facility];  Service: Pulmonary;  Laterality: Bilateral;    BRONCHOSCOPY WITH ION ROBOTIC ASSIST N/A 2023    Procedure: BRONCHOSCOPY WITH ION ROBOT AND EBUS;  Surgeon: John Clemens MD;  Location:  [facility];  Service: Robotics - Pulmonary;  Laterality: N/A;  Ion cath #6 - 0032,  #6 - 0030, cath guide # 0077. EBUS scope removed with balloon intact.    CARDIAC CATHETERIZATION N/A 2017    Procedure: Left Heart Cath;  Surgeon: Jatinder Matias MD;  Location:  [facility];  Service:     CARDIAC CATHETERIZATION N/A 2021    Procedure: Left Heart Cath;  Surgeon: Tolu Steinberg MD;  Location:  [facility];  Service: Cardiology;  Laterality: N/A;    COLONOSCOPY      ENDOSCOPY      GALLBLADDER SURGERY      PORTACATH PLACEMENT N/A 2023    Procedure: INSERTION OF PORTACATH;  Surgeon: Petr Velásquez MD;  Location:  [facility];  Service: General;  Laterality: N/A;    VENTRAL HERNIA REPAIR N/A 2020    Procedure: VENTRAL HERNIA REPAIR LAPAROSCOPIC WITH DAVINCI ROBOT;  Surgeon: Petr Velásquez MD;  Location:  [facility];  Service: DaVinci;  Laterality: N/A;     Social History     Socioeconomic History    Marital status: Single    Number of children: 6   Tobacco Use    Smoking status: Former     Current packs/day: 0.00     Average packs/day: 3.0 packs/day for 16.7 years (50.2 ttl pk-yrs)     Types: Cigarettes     Start date: 4/3/1998     Quit date: 2015     Years since quittin.6     Passive exposure: Past    Smokeless tobacco: Never   Vaping Use    Vaping status: Never Used   Substance and Sexual Activity    Alcohol use: No    Drug use: No    Sexual activity: Never        Physical Exam  Constitutional:       General:  "She is awake.      Appearance: Normal appearance. She is obese.   HENT:      Head: Normocephalic and atraumatic.      Nose: Nose normal.      Mouth/Throat:      Mouth: Mucous membranes are moist.      Pharynx: Oropharynx is clear.   Eyes:      Conjunctiva/sclera: Conjunctivae normal.      Pupils: Pupils are equal, round, and reactive to light.   Cardiovascular:      Rate and Rhythm: Normal rate and regular rhythm.      Pulses: Normal pulses.      Heart sounds: Normal heart sounds. No murmur heard.     No friction rub. No gallop.   Pulmonary:      Effort: Pulmonary effort is normal. No tachypnea, accessory muscle usage or respiratory distress.      Breath sounds: Rhonchi (cleared with coughing) present. No decreased breath sounds, wheezing or rales.   Musculoskeletal:         General: Normal range of motion.      Cervical back: Full passive range of motion without pain and normal range of motion.   Skin:     General: Skin is warm and dry.   Neurological:      General: No focal deficit present.      Mental Status: She is alert. Mental status is at baseline.   Psychiatric:         Mood and Affect: Mood normal.         Behavior: Behavior normal. Behavior is cooperative.         Thought Content: Thought content normal.          Result Review :  The following labs and radiology results have been reviewed.    Lab Review:   No results found for: \"FEV1\", \"FVC\", \"LMP3MVW\", \"TLC\", \"DLCO\"  Lab on 08/29/2024   Component Date Value Ref Range Status    Glucose 08/29/2024 156 (H)  65 - 99 mg/dL Final    BUN 08/29/2024 24 (H)  8 - 23 mg/dL Final    Creatinine 08/29/2024 1.04 (H)  0.57 - 1.00 mg/dL Final    Sodium 08/29/2024 143  136 - 145 mmol/L Final    Potassium 08/29/2024 4.1  3.5 - 5.2 mmol/L Final    Slight hemolysis detected by analyzer. Result may be falsely elevated.    Chloride 08/29/2024 105  98 - 107 mmol/L Final    CO2 08/29/2024 27.3  22.0 - 29.0 mmol/L Final    Calcium 08/29/2024 9.4  8.6 - 10.5 mg/dL Final    Total " Protein 08/29/2024 7.4  6.0 - 8.5 g/dL Final    Albumin 08/29/2024 4.1  3.5 - 5.2 g/dL Final    ALT (SGPT) 08/29/2024 10  1 - 33 U/L Final    AST (SGOT) 08/29/2024 17  1 - 32 U/L Final    Alkaline Phosphatase 08/29/2024 111  39 - 117 U/L Final    Total Bilirubin 08/29/2024 0.2  0.0 - 1.2 mg/dL Final    Globulin 08/29/2024 3.3  gm/dL Final    A/G Ratio 08/29/2024 1.2  g/dL Final    BUN/Creatinine Ratio 08/29/2024 23.1  7.0 - 25.0 Final    Anion Gap 08/29/2024 10.7  5.0 - 15.0 mmol/L Final    eGFR 08/29/2024 55.1 (L)  >60.0 mL/min/1.73 Final    TSH 08/29/2024 3.150  0.270 - 4.200 uIU/mL Final    Free T4 08/29/2024 0.76 (L)  0.92 - 1.68 ng/dL Final    WBC 08/29/2024 6.30  3.40 - 10.80 10*3/mm3 Final    RBC 08/29/2024 4.40  3.77 - 5.28 10*6/mm3 Final    Hemoglobin 08/29/2024 11.6 (L)  12.0 - 15.9 g/dL Final    Hematocrit 08/29/2024 37.1  34.0 - 46.6 % Final    MCV 08/29/2024 84.3  79.0 - 97.0 fL Final    MCH 08/29/2024 26.4 (L)  26.6 - 33.0 pg Final    MCHC 08/29/2024 31.3 (L)  31.5 - 35.7 g/dL Final    RDW 08/29/2024 14.0  12.3 - 15.4 % Final    RDW-SD 08/29/2024 43.0  37.0 - 54.0 fl Final    MPV 08/29/2024 10.6  6.0 - 12.0 fL Final    Platelets 08/29/2024 214  140 - 450 10*3/mm3 Final    Neutrophil % 08/29/2024 66.2  42.7 - 76.0 % Final    Lymphocyte % 08/29/2024 19.2 (L)  19.6 - 45.3 % Final    Monocyte % 08/29/2024 8.9  5.0 - 12.0 % Final    Eosinophil % 08/29/2024 4.0  0.3 - 6.2 % Final    Basophil % 08/29/2024 1.1  0.0 - 1.5 % Final    Immature Grans % 08/29/2024 0.6 (H)  0.0 - 0.5 % Final    Neutrophils, Absolute 08/29/2024 4.17  1.70 - 7.00 10*3/mm3 Final    Lymphocytes, Absolute 08/29/2024 1.21  0.70 - 3.10 10*3/mm3 Final    Monocytes, Absolute 08/29/2024 0.56  0.10 - 0.90 10*3/mm3 Final    Eosinophils, Absolute 08/29/2024 0.25  0.00 - 0.40 10*3/mm3 Final    Basophils, Absolute 08/29/2024 0.07  0.00 - 0.20 10*3/mm3 Final    Immature Grans, Absolute 08/29/2024 0.04  0.00 - 0.05 10*3/mm3 Final    nRBC 08/29/2024  0.0  0.0 - 0.2 /100 WBC Final   Infusion on 08/15/2024   Component Date Value Ref Range Status    Iron 08/15/2024 64  37 - 145 mcg/dL Final    Iron Saturation (TSAT) 08/15/2024 16 (L)  20 - 50 % Final    Transferrin 08/15/2024 273  200 - 360 mg/dL Final    TIBC 08/15/2024 407  298 - 536 mcg/dL Final    Ferritin 08/15/2024 74.89  13.00 - 150.00 ng/mL Final    Glucose 08/15/2024 233 (H)  65 - 99 mg/dL Final    BUN 08/15/2024 17  8 - 23 mg/dL Final    Creatinine 08/15/2024 1.08 (H)  0.57 - 1.00 mg/dL Final    Sodium 08/15/2024 141  136 - 145 mmol/L Final    Potassium 08/15/2024 4.0  3.5 - 5.2 mmol/L Final    Chloride 08/15/2024 102  98 - 107 mmol/L Final    CO2 08/15/2024 25.7  22.0 - 29.0 mmol/L Final    Calcium 08/15/2024 9.4  8.6 - 10.5 mg/dL Final    Total Protein 08/15/2024 7.0  6.0 - 8.5 g/dL Final    Albumin 08/15/2024 4.1  3.5 - 5.2 g/dL Final    ALT (SGPT) 08/15/2024 11  1 - 33 U/L Final    AST (SGOT) 08/15/2024 13  1 - 32 U/L Final    Alkaline Phosphatase 08/15/2024 111  39 - 117 U/L Final    Total Bilirubin 08/15/2024 0.2  0.0 - 1.2 mg/dL Final    Globulin 08/15/2024 2.9  gm/dL Final    A/G Ratio 08/15/2024 1.4  g/dL Final    BUN/Creatinine Ratio 08/15/2024 15.7  7.0 - 25.0 Final    Anion Gap 08/15/2024 13.3  5.0 - 15.0 mmol/L Final    eGFR 08/15/2024 52.7 (L)  >60.0 mL/min/1.73 Final    WBC 08/15/2024 7.09  3.40 - 10.80 10*3/mm3 Final    RBC 08/15/2024 4.57  3.77 - 5.28 10*6/mm3 Final    Hemoglobin 08/15/2024 11.9 (L)  12.0 - 15.9 g/dL Final    Hematocrit 08/15/2024 38.9  34.0 - 46.6 % Final    MCV 08/15/2024 85.1  79.0 - 97.0 fL Final    MCH 08/15/2024 26.0 (L)  26.6 - 33.0 pg Final    MCHC 08/15/2024 30.6 (L)  31.5 - 35.7 g/dL Final    RDW 08/15/2024 13.9  12.3 - 15.4 % Final    RDW-SD 08/15/2024 43.1  37.0 - 54.0 fl Final    MPV 08/15/2024 10.7  6.0 - 12.0 fL Final    Platelets 08/15/2024 208  140 - 450 10*3/mm3 Final    Neutrophil % 08/15/2024 72.8  42.7 - 76.0 % Final    Lymphocyte % 08/15/2024 14.5  (L)  19.6 - 45.3 % Final    Monocyte % 08/15/2024 8.2  5.0 - 12.0 % Final    Eosinophil % 08/15/2024 3.1  0.3 - 6.2 % Final    Basophil % 08/15/2024 0.8  0.0 - 1.5 % Final    Immature Grans % 08/15/2024 0.6 (H)  0.0 - 0.5 % Final    Neutrophils, Absolute 08/15/2024 5.16  1.70 - 7.00 10*3/mm3 Final    Lymphocytes, Absolute 08/15/2024 1.03  0.70 - 3.10 10*3/mm3 Final    Monocytes, Absolute 08/15/2024 0.58  0.10 - 0.90 10*3/mm3 Final    Eosinophils, Absolute 08/15/2024 0.22  0.00 - 0.40 10*3/mm3 Final    Basophils, Absolute 08/15/2024 0.06  0.00 - 0.20 10*3/mm3 Final    Immature Grans, Absolute 08/15/2024 0.04  0.00 - 0.05 10*3/mm3 Final    nRBC 08/15/2024 0.0  0.0 - 0.2 /100 WBC Final   Office Visit on 08/07/2024   Component Date Value Ref Range Status    FEV1 08/07/2024 0.86  liters Final    FVC 08/07/2024 1.03  liters Final    FEV1/FVC 08/07/2024 83  % Final    PEF 08/07/2024 1.77  L/s Final   Lab on 08/01/2024   Component Date Value Ref Range Status    Glucose 08/01/2024 92  65 - 99 mg/dL Final    BUN 08/01/2024 14  8 - 23 mg/dL Final    Creatinine 08/01/2024 0.87  0.57 - 1.00 mg/dL Final    Sodium 08/01/2024 139  136 - 145 mmol/L Final    Potassium 08/01/2024 4.8  3.5 - 5.2 mmol/L Final    Chloride 08/01/2024 101  98 - 107 mmol/L Final    CO2 08/01/2024 27.1  22.0 - 29.0 mmol/L Final    Calcium 08/01/2024 9.3  8.6 - 10.5 mg/dL Final    Total Protein 08/01/2024 7.2  6.0 - 8.5 g/dL Final    Albumin 08/01/2024 4.1  3.5 - 5.2 g/dL Final    ALT (SGPT) 08/01/2024 13  1 - 33 U/L Final    AST (SGOT) 08/01/2024 19  1 - 32 U/L Final    Alkaline Phosphatase 08/01/2024 121 (H)  39 - 117 U/L Final    Total Bilirubin 08/01/2024 0.3  0.0 - 1.2 mg/dL Final    Globulin 08/01/2024 3.1  gm/dL Final    A/G Ratio 08/01/2024 1.3  g/dL Final    BUN/Creatinine Ratio 08/01/2024 16.1  7.0 - 25.0 Final    Anion Gap 08/01/2024 10.9  5.0 - 15.0 mmol/L Final    eGFR 08/01/2024 68.3  >60.0 mL/min/1.73 Final    WBC 08/01/2024 5.96  3.40 - 10.80  10*3/mm3 Final    RBC 08/01/2024 4.62  3.77 - 5.28 10*6/mm3 Final    Hemoglobin 08/01/2024 12.0  12.0 - 15.9 g/dL Final    Hematocrit 08/01/2024 39.1  34.0 - 46.6 % Final    MCV 08/01/2024 84.6  79.0 - 97.0 fL Final    MCH 08/01/2024 26.0 (L)  26.6 - 33.0 pg Final    MCHC 08/01/2024 30.7 (L)  31.5 - 35.7 g/dL Final    RDW 08/01/2024 14.3  12.3 - 15.4 % Final    RDW-SD 08/01/2024 43.9  37.0 - 54.0 fl Final    MPV 08/01/2024 10.9  6.0 - 12.0 fL Final    Platelets 08/01/2024 230  140 - 450 10*3/mm3 Final    Neutrophil % 08/01/2024 62.5  42.7 - 76.0 % Final    Lymphocyte % 08/01/2024 21.6  19.6 - 45.3 % Final    Monocyte % 08/01/2024 9.9  5.0 - 12.0 % Final    Eosinophil % 08/01/2024 4.5  0.3 - 6.2 % Final    Basophil % 08/01/2024 1.2  0.0 - 1.5 % Final    Immature Grans % 08/01/2024 0.3  0.0 - 0.5 % Final    Neutrophils, Absolute 08/01/2024 3.72  1.70 - 7.00 10*3/mm3 Final    Lymphocytes, Absolute 08/01/2024 1.29  0.70 - 3.10 10*3/mm3 Final    Monocytes, Absolute 08/01/2024 0.59  0.10 - 0.90 10*3/mm3 Final    Eosinophils, Absolute 08/01/2024 0.27  0.00 - 0.40 10*3/mm3 Final    Basophils, Absolute 08/01/2024 0.07  0.00 - 0.20 10*3/mm3 Final    Immature Grans, Absolute 08/01/2024 0.02  0.00 - 0.05 10*3/mm3 Final    nRBC 08/01/2024 0.0  0.0 - 0.2 /100 WBC Final   Orders Only on 07/18/2024   Component Date Value Ref Range Status    Glucose 07/18/2024 101 (H)  65 - 99 mg/dL Final    BUN 07/18/2024 14  8 - 23 mg/dL Final    Creatinine 07/18/2024 0.85  0.57 - 1.00 mg/dL Final    Sodium 07/18/2024 145  136 - 145 mmol/L Final    Potassium 07/18/2024 4.7  3.5 - 5.2 mmol/L Final    Chloride 07/18/2024 110 (H)  98 - 107 mmol/L Final    CO2 07/18/2024 24.8  22.0 - 29.0 mmol/L Final    Calcium 07/18/2024 8.9  8.6 - 10.5 mg/dL Final    Total Protein 07/18/2024 6.9  6.0 - 8.5 g/dL Final    Albumin 07/18/2024 4.1  3.5 - 5.2 g/dL Final    ALT (SGPT) 07/18/2024 11  1 - 33 U/L Final    AST (SGOT) 07/18/2024 19  1 - 32 U/L Final     Alkaline Phosphatase 07/18/2024 115  39 - 117 U/L Final    Total Bilirubin 07/18/2024 0.3  0.0 - 1.2 mg/dL Final    Globulin 07/18/2024 2.8  gm/dL Final    A/G Ratio 07/18/2024 1.5  g/dL Final    BUN/Creatinine Ratio 07/18/2024 16.5  7.0 - 25.0 Final    Anion Gap 07/18/2024 10.2  5.0 - 15.0 mmol/L Final    eGFR 07/18/2024 70.7  >60.0 mL/min/1.73 Final    TSH 07/18/2024 3.180  0.270 - 4.200 uIU/mL Final    Free T4 07/18/2024 0.87 (L)  0.93 - 1.70 ng/dL Final    WBC 07/18/2024 7.49  3.40 - 10.80 10*3/mm3 Final    RBC 07/18/2024 4.30  3.77 - 5.28 10*6/mm3 Final    Hemoglobin 07/18/2024 11.3 (L)  12.0 - 15.9 g/dL Final    Hematocrit 07/18/2024 36.5  34.0 - 46.6 % Final    MCV 07/18/2024 84.9  79.0 - 97.0 fL Final    MCH 07/18/2024 26.3 (L)  26.6 - 33.0 pg Final    MCHC 07/18/2024 31.0 (L)  31.5 - 35.7 g/dL Final    RDW 07/18/2024 14.4  12.3 - 15.4 % Final    RDW-SD 07/18/2024 44.5  37.0 - 54.0 fl Final    MPV 07/18/2024 10.9  6.0 - 12.0 fL Final    Platelets 07/18/2024 222  140 - 450 10*3/mm3 Final    Neutrophil % 07/18/2024 72.3  42.7 - 76.0 % Final    Lymphocyte % 07/18/2024 14.7 (L)  19.6 - 45.3 % Final    Monocyte % 07/18/2024 8.8  5.0 - 12.0 % Final    Eosinophil % 07/18/2024 3.1  0.3 - 6.2 % Final    Basophil % 07/18/2024 0.7  0.0 - 1.5 % Final    Immature Grans % 07/18/2024 0.4  0.0 - 0.5 % Final    Neutrophils, Absolute 07/18/2024 5.42  1.70 - 7.00 10*3/mm3 Final    Lymphocytes, Absolute 07/18/2024 1.10  0.70 - 3.10 10*3/mm3 Final    Monocytes, Absolute 07/18/2024 0.66  0.10 - 0.90 10*3/mm3 Final    Eosinophils, Absolute 07/18/2024 0.23  0.00 - 0.40 10*3/mm3 Final    Basophils, Absolute 07/18/2024 0.05  0.00 - 0.20 10*3/mm3 Final    Immature Grans, Absolute 07/18/2024 0.03  0.00 - 0.05 10*3/mm3 Final    nRBC 07/18/2024 0.0  0.0 - 0.2 /100 WBC Final   Lab on 07/02/2024   Component Date Value Ref Range Status    Glucose 07/02/2024 142 (H)  65 - 99 mg/dL Final    BUN 07/02/2024 17  8 - 23 mg/dL Final    Creatinine  07/02/2024 0.96  0.57 - 1.00 mg/dL Final    Sodium 07/02/2024 140  136 - 145 mmol/L Final    Potassium 07/02/2024 3.9  3.5 - 5.2 mmol/L Final    Chloride 07/02/2024 102  98 - 107 mmol/L Final    CO2 07/02/2024 27.2  22.0 - 29.0 mmol/L Final    Calcium 07/02/2024 9.9  8.6 - 10.5 mg/dL Final    Total Protein 07/02/2024 7.4  6.0 - 8.5 g/dL Final    Albumin 07/02/2024 4.1  3.5 - 5.2 g/dL Final    ALT (SGPT) 07/02/2024 10  1 - 33 U/L Final    AST (SGOT) 07/02/2024 12  1 - 32 U/L Final    Alkaline Phosphatase 07/02/2024 114  39 - 117 U/L Final    Total Bilirubin 07/02/2024 0.4  0.0 - 1.2 mg/dL Final    Globulin 07/02/2024 3.3  gm/dL Final    A/G Ratio 07/02/2024 1.2  g/dL Final    BUN/Creatinine Ratio 07/02/2024 17.7  7.0 - 25.0 Final    Anion Gap 07/02/2024 10.8  5.0 - 15.0 mmol/L Final    eGFR 07/02/2024 61.1  >60.0 mL/min/1.73 Final    WBC 07/02/2024 7.45  3.40 - 10.80 10*3/mm3 Final    RBC 07/02/2024 4.53  3.77 - 5.28 10*6/mm3 Final    Hemoglobin 07/02/2024 11.9 (L)  12.0 - 15.9 g/dL Final    Hematocrit 07/02/2024 38.0  34.0 - 46.6 % Final    MCV 07/02/2024 83.9  79.0 - 97.0 fL Final    MCH 07/02/2024 26.3 (L)  26.6 - 33.0 pg Final    MCHC 07/02/2024 31.3 (L)  31.5 - 35.7 g/dL Final    RDW 07/02/2024 14.2  12.3 - 15.4 % Final    RDW-SD 07/02/2024 43.4  37.0 - 54.0 fl Final    MPV 07/02/2024 11.1  6.0 - 12.0 fL Final    Platelets 07/02/2024 209  140 - 450 10*3/mm3 Final    Neutrophil % 07/02/2024 72.1  42.7 - 76.0 % Final    Lymphocyte % 07/02/2024 14.8 (L)  19.6 - 45.3 % Final    Monocyte % 07/02/2024 7.9  5.0 - 12.0 % Final    Eosinophil % 07/02/2024 3.8  0.3 - 6.2 % Final    Basophil % 07/02/2024 0.7  0.0 - 1.5 % Final    Immature Grans % 07/02/2024 0.7 (H)  0.0 - 0.5 % Final    Neutrophils, Absolute 07/02/2024 5.38  1.70 - 7.00 10*3/mm3 Final    Lymphocytes, Absolute 07/02/2024 1.10  0.70 - 3.10 10*3/mm3 Final    Monocytes, Absolute 07/02/2024 0.59  0.10 - 0.90 10*3/mm3 Final    Eosinophils, Absolute 07/02/2024  0.28  0.00 - 0.40 10*3/mm3 Final    Basophils, Absolute 07/02/2024 0.05  0.00 - 0.20 10*3/mm3 Final    Immature Grans, Absolute 07/02/2024 0.05  0.00 - 0.05 10*3/mm3 Final    nRBC 07/02/2024 0.0  0.0 - 0.2 /100 WBC Final   Lab on 06/13/2024   Component Date Value Ref Range Status    Glucose 06/13/2024 154 (H)  65 - 99 mg/dL Final    BUN 06/13/2024 22  8 - 23 mg/dL Final    Creatinine 06/13/2024 1.22 (H)  0.57 - 1.00 mg/dL Final    Sodium 06/13/2024 142  136 - 145 mmol/L Final    Potassium 06/13/2024 3.9  3.5 - 5.2 mmol/L Final    Chloride 06/13/2024 103  98 - 107 mmol/L Final    CO2 06/13/2024 27.8  22.0 - 29.0 mmol/L Final    Calcium 06/13/2024 9.2  8.6 - 10.5 mg/dL Final    Total Protein 06/13/2024 6.9  6.0 - 8.5 g/dL Final    Albumin 06/13/2024 3.8  3.5 - 5.2 g/dL Final    ALT (SGPT) 06/13/2024 9  1 - 33 U/L Final    AST (SGOT) 06/13/2024 12  1 - 32 U/L Final    Alkaline Phosphatase 06/13/2024 112  39 - 117 U/L Final    Total Bilirubin 06/13/2024 0.3  0.0 - 1.2 mg/dL Final    Globulin 06/13/2024 3.1  gm/dL Final    A/G Ratio 06/13/2024 1.2  g/dL Final    BUN/Creatinine Ratio 06/13/2024 18.0  7.0 - 25.0 Final    Anion Gap 06/13/2024 11.2  5.0 - 15.0 mmol/L Final    eGFR 06/13/2024 45.8 (L)  >60.0 mL/min/1.73 Final    WBC 06/13/2024 12.02 (H)  3.40 - 10.80 10*3/mm3 Final    RBC 06/13/2024 4.33  3.77 - 5.28 10*6/mm3 Final    Hemoglobin 06/13/2024 11.2 (L)  12.0 - 15.9 g/dL Final    Hematocrit 06/13/2024 36.7  34.0 - 46.6 % Final    MCV 06/13/2024 84.8  79.0 - 97.0 fL Final    MCH 06/13/2024 25.9 (L)  26.6 - 33.0 pg Final    MCHC 06/13/2024 30.5 (L)  31.5 - 35.7 g/dL Final    RDW 06/13/2024 12.9  12.3 - 15.4 % Final    RDW-SD 06/13/2024 39.4  37.0 - 54.0 fl Final    MPV 06/13/2024 10.6  6.0 - 12.0 fL Final    Platelets 06/13/2024 338  140 - 450 10*3/mm3 Final    Neutrophil % 06/13/2024 77.5 (H)  42.7 - 76.0 % Final    Lymphocyte % 06/13/2024 13.2 (L)  19.6 - 45.3 % Final    Monocyte % 06/13/2024 6.7  5.0 - 12.0 %  Final    Eosinophil % 06/13/2024 0.4  0.3 - 6.2 % Final    Basophil % 06/13/2024 0.5  0.0 - 1.5 % Final    Immature Grans % 06/13/2024 1.7 (H)  0.0 - 0.5 % Final    Neutrophils, Absolute 06/13/2024 9.30 (H)  1.70 - 7.00 10*3/mm3 Final    Lymphocytes, Absolute 06/13/2024 1.59  0.70 - 3.10 10*3/mm3 Final    Monocytes, Absolute 06/13/2024 0.81  0.10 - 0.90 10*3/mm3 Final    Eosinophils, Absolute 06/13/2024 0.05  0.00 - 0.40 10*3/mm3 Final    Basophils, Absolute 06/13/2024 0.06  0.00 - 0.20 10*3/mm3 Final    Immature Grans, Absolute 06/13/2024 0.21 (H)  0.00 - 0.05 10*3/mm3 Final    nRBC 06/13/2024 0.0  0.0 - 0.2 /100 WBC Final   Admission on 06/06/2024, Discharged on 06/06/2024   Component Date Value Ref Range Status    QT Interval 06/06/2024 376  ms Final    QTC Interval 06/06/2024 454  ms Final    Glucose 06/06/2024 110 (H)  65 - 99 mg/dL Final    BUN 06/06/2024 25 (H)  8 - 23 mg/dL Final    Creatinine 06/06/2024 1.16 (H)  0.57 - 1.00 mg/dL Final    Sodium 06/06/2024 138  136 - 145 mmol/L Final    Potassium 06/06/2024 4.0  3.5 - 5.2 mmol/L Final    Chloride 06/06/2024 100  98 - 107 mmol/L Final    CO2 06/06/2024 28.3  22.0 - 29.0 mmol/L Final    Calcium 06/06/2024 9.8  8.6 - 10.5 mg/dL Final    Total Protein 06/06/2024 7.6  6.0 - 8.5 g/dL Final    Albumin 06/06/2024 3.9  3.5 - 5.2 g/dL Final    ALT (SGPT) 06/06/2024 7  1 - 33 U/L Final    AST (SGOT) 06/06/2024 10  1 - 32 U/L Final    Alkaline Phosphatase 06/06/2024 111  39 - 117 U/L Final    Total Bilirubin 06/06/2024 0.4  0.0 - 1.2 mg/dL Final    Globulin 06/06/2024 3.7  gm/dL Final    A/G Ratio 06/06/2024 1.1  g/dL Final    BUN/Creatinine Ratio 06/06/2024 21.6  7.0 - 25.0 Final    Anion Gap 06/06/2024 9.7  5.0 - 15.0 mmol/L Final    eGFR 06/06/2024 48.7 (L)  >60.0 mL/min/1.73 Final    proBNP 06/06/2024 92.9  0.0 - 1,800.0 pg/mL Final    HS Troponin T 06/06/2024 18 (H)  <14 ng/L Final    Protime 06/06/2024 14.8 (H)  12.1 - 14.7 Seconds Final    INR 06/06/2024 1.15  (H)  0.90 - 1.10 Final    C-Reactive Protein 06/06/2024 14.43 (H)  0.00 - 0.50 mg/dL Final    Procalcitonin 06/06/2024 0.15  0.00 - 0.25 ng/mL Final    Lactate 06/06/2024 1.0  0.5 - 2.0 mmol/L Final    Respiratory Culture 06/06/2024 Scant growth (1+) Normal respiratory casey. No S. aureus or Pseudomonas aeruginosa detected. Final report.   Final    Gram Stain 06/06/2024 Rare (1+) Epithelial cells seen   Final    Gram Stain 06/06/2024 Many (4+) WBCs seen   Final    Gram Stain 06/06/2024 Few (2+) Mixed bacterial morphotypes seen on Gram Stain   Final    Blood Culture 06/06/2024 No growth at 5 days   Final    Blood Culture 06/06/2024 No growth at 5 days   Final    Extra Tube 06/06/2024 Hold for add-ons.   Final    Auto resulted.    Extra Tube 06/06/2024 hold for add-on   Final    Auto resulted    Extra Tube 06/06/2024 Hold for add-ons.   Final    Auto resulted.    Extra Tube 06/06/2024 Hold for add-ons.   Final    Auto resulted    WBC 06/06/2024 9.52  3.40 - 10.80 10*3/mm3 Final    RBC 06/06/2024 4.41  3.77 - 5.28 10*6/mm3 Final    Hemoglobin 06/06/2024 11.4 (L)  12.0 - 15.9 g/dL Final    Hematocrit 06/06/2024 37.7  34.0 - 46.6 % Final    MCV 06/06/2024 85.5  79.0 - 97.0 fL Final    MCH 06/06/2024 25.9 (L)  26.6 - 33.0 pg Final    MCHC 06/06/2024 30.2 (L)  31.5 - 35.7 g/dL Final    RDW 06/06/2024 13.1  12.3 - 15.4 % Final    RDW-SD 06/06/2024 40.7  37.0 - 54.0 fl Final    MPV 06/06/2024 10.5  6.0 - 12.0 fL Final    Platelets 06/06/2024 242  140 - 450 10*3/mm3 Final    Neutrophil % 06/06/2024 74.7  42.7 - 76.0 % Final    Lymphocyte % 06/06/2024 11.9 (L)  19.6 - 45.3 % Final    Monocyte % 06/06/2024 9.2  5.0 - 12.0 % Final    Eosinophil % 06/06/2024 2.6  0.3 - 6.2 % Final    Basophil % 06/06/2024 0.4  0.0 - 1.5 % Final    Immature Grans % 06/06/2024 1.2 (H)  0.0 - 0.5 % Final    Neutrophils, Absolute 06/06/2024 7.11 (H)  1.70 - 7.00 10*3/mm3 Final    Lymphocytes, Absolute 06/06/2024 1.13  0.70 - 3.10 10*3/mm3 Final     Monocytes, Absolute 06/06/2024 0.88  0.10 - 0.90 10*3/mm3 Final    Eosinophils, Absolute 06/06/2024 0.25  0.00 - 0.40 10*3/mm3 Final    Basophils, Absolute 06/06/2024 0.04  0.00 - 0.20 10*3/mm3 Final    Immature Grans, Absolute 06/06/2024 0.11 (H)  0.00 - 0.05 10*3/mm3 Final    nRBC 06/06/2024 0.0  0.0 - 0.2 /100 WBC Final    COVID19 06/06/2024 Not Detected  Not Detected - Ref. Range Final    Influenza A PCR 06/06/2024 Not Detected  Not Detected Final    Influenza B PCR 06/06/2024 Not Detected  Not Detected Final    Site 06/06/2024 Left Radial   Final    Gonzalo's Test 06/06/2024 Positive   Final    pH, Arterial 06/06/2024 7.406  7.350 - 7.450 pH units Final    pCO2, Arterial 06/06/2024 42.5  35.0 - 45.0 mm Hg Final    pO2, Arterial 06/06/2024 41.5 (C)  83.0 - 108.0 mm Hg Final    85 Value below critical limit    HCO3, Arterial 06/06/2024 26.7 (H)  20.0 - 26.0 mmol/L Final    83 Value above reference range    Base Excess, Arterial 06/06/2024 1.7  0.0 - 2.0 mmol/L Final    O2 Saturation, Arterial 06/06/2024 74.1 (L)  94.0 - 99.0 % Final    84 Value below reference range    Hemoglobin, Blood Gas 06/06/2024 11.9 (L)  13.5 - 17.5 g/dL Final    84 Value below reference range    Hematocrit, Blood Gas 06/06/2024 36.5 (L)  38.0 - 51.0 % Final    84 Value below reference range    Oxyhemoglobin 06/06/2024 72.6 (L)  94 - 99 % Final    84 Value below reference range    Methemoglobin 06/06/2024 0.20  0.00 - 3.00 % Final    Carboxyhemoglobin 06/06/2024 1.8  0 - 5 % Final    A-a DO2 06/06/2024 51.9  0.0 - 300.0 mmHg Final    CO2 Content 06/06/2024 28.0  22 - 33 mmol/L Final    Barometric Pressure for Blood Gas 06/06/2024 720  mmHg Final    Modality 06/06/2024 Room Air   Final    FIO2 06/06/2024 21  % Final    Ventilator Mode 06/06/2024 NA   Final    Notified Who 06/06/2024 DR NARANJO AND RN   Final    Notified By 06/06/2024 [id number]   Final    Notified Time 06/06/2024 06/06/2024 15:54   Final    Collected by 06/06/2024 [id number]   Final     Meter: [id number]     :  [id number]        Imaging Results (Most Recent)       None            Radiology Review:   Imaging Results (Last 72 Hours)       ** No results found for the last 72 hours. **          Reviewed CT chest with contrast from 08/01/2024  Narrative & Impression   PROCEDURE: CT CHEST W CONTRAST DIAGNOSTIC-     HISTORY: h/o NSCLC receving consolidation tx with IO; C34.11-Malignant  neoplasm of upper lobe, right bronchus or lung     COMPARISON: 5/1/2024.     PROCEDURE: Multiple axial CT images were obtained from the thoracic  inlet through the upper abdomen following the administration of  Isovue-300 intravenous contrast. This study was performed with  techniques to keep radiation doses as low as reasonably achievable  (ALARA). Individualized dose reduction techniques using automated  exposure control or adjustment of mA and/or kV according to the patient  size were employed.     FINDINGS:  Soft tissue windows demonstrate no adenopathy within the chest. The  thyroid gland is unremarkable. The heart is normal in size. The aorta is  normal in caliber. There are no pleural or pericardial effusions. Lung  windows redemonstrate airspace disease in the right upper lobe with  volume loss felt to reflect posttreatment related change. Scarring is  present in the anterior left upper lobe and right middle lobe. There is  airspace disease in both lung bases similar in appearance to the prior  exam.. The visualized upper abdomen is unremarkable. Bone windows reveal  no lytic or destructive lesions.     IMPRESSION:  Airspace disease with volume loss in the right upper lobe  similar in appearance to the prior exam felt to reflect posttreatment  related change. Continued follow-up is recommended.        This report was finalized on 8/1/2024 4:12 PM by Gianfranco Nuñez M.D..       CT chest imaging from 08/01/2024 (left) and CT chest imaging from 05/01/2024 (right)      Reviewed spirometry from  08/13/2024        Assessment / Plan         Assessment   Diagnoses and all orders for this visit:    1. Chronic obstructive pulmonary disease, unspecified COPD type (Primary)  Spirometry performed previously but was likely inaccurate as patient not able to exhale fully.   Previously swabbed for A1AD but haven't received results, retested during visit.   Continue Breztri inhaler 2 puffs twice daily. Concerned with noncompliance of inhaler as patient last filled 07/31/24.   Continue albuterol inhaler every 4-6 hours as needed.   Continue DuoNeb treatments every 4-6 hours as needed.   Sent refills for inhalers/neb treatements to [facility] pharmacy so that medications can be mailed.    -     Budeson-Glycopyrrol-Formoterol (Breztri Aerosphere) 160-9-4.8 MCG/ACT aerosol inhaler; Inhale 2 puffs by mouth 2 (Two) Times a Day.  Dispense: 10.7 g; Refill: 6  -     ipratropium-albuterol (DUO-NEB) 0.5-2.5 mg/3 ml nebulizer; Take 3 mL by nebulization Every 4 (Four) Hours As Needed for Wheezing or Shortness of Air.  Dispense: 320 mL; Refill: 3  -     albuterol sulfate  (90 Base) MCG/ACT inhaler; Inhale 2 puffs by mouth Every 4 (Four) Hours As Needed for Wheezing.  Dispense: 18 g; Refill: 6    2. Chronic respiratory failure with hypoxia  Has supplemental oxygen supplies (stationary oxygen concentrator and large tanks) which she utilizes as needed.   Doesn't qualify for POC due to not meeting the requirement of using 8 tanks consecutively for 3 months.     3. Former smoker  4. Non-small cell cancer of right lung  Mayra Hays  reports that she quit smoking about 9 years ago. Her smoking use included cigarettes. She started smoking about 26 years ago. She has a 50.2 pack-year smoking history. She has been exposed to tobacco smoke. She has never used smokeless tobacco.   Follows closely with oncology. Completed radiation and remains on maintenance chemotherapy with Durvalumab.   Recent CT imaging noted airspace disease with volume  loss in the right upper lobe similar to previous exam and felt to be posttreatment related.   Completes routine imaging per oncology recommendations.      Medications Discontinued During This Encounter   Medication Reason    ipratropium-albuterol (DUO-NEB) 0.5-2.5 mg/3 ml nebulizer Reorder    Budeson-Glycopyrrol-Formoterol (Breztri Aerosphere) 160-9-4.8 MCG/ACT aerosol inhaler Reorder    albuterol sulfate  (90 Base) MCG/ACT inhaler Reorder     New Medications Ordered This Visit   Medications    Budeson-Glycopyrrol-Formoterol (Breztri Aerosphere) 160-9-4.8 MCG/ACT aerosol inhaler     Sig: Inhale 2 puffs by mouth 2 (Two) Times a Day.     Dispense:  10.7 g     Refill:  6     Note from CoverMyMeds suggested to bill under Medicare Part D (covered)    albuterol sulfate  (90 Base) MCG/ACT inhaler     Sig: Inhale 2 puffs by mouth Every 4 (Four) Hours As Needed for Wheezing.     Dispense:  18 g     Refill:  6    ipratropium-albuterol (DUO-NEB) 0.5-2.5 mg/3 ml nebulizer     Sig: Take 3 mL by nebulization Every 4 (Four) Hours As Needed for Wheezing or Shortness of Air.     Dispense:  320 mL     Refill:  3     I spent 30 minutes caring for Mayra on this date of service. This time includes time spent by me in the following activities:preparing for the visit, reviewing tests, obtaining and/or reviewing a separately obtained history, performing a medically appropriate examination and/or evaluation , counseling and educating the patient/family/caregiver, ordering medications, tests, or procedures, referring and communicating with other health care professionals , documenting information in the medical record, independently interpreting results and communicating that information with the patient/family/caregiver, and care coordination    Follow Up   Return in about 6 months (around 3/3/2025), or if symptoms worsen or fail to improve, for Next scheduled follow up.    Patient was given instructions and counseling  regarding her condition or for health maintenance advice. Please see specific information pulled into the AVS if appropriate.       This document has been electronically signed by Jocelyn Mendoza PA-C   September 3, 2024 15:38 EDT    Dictated Utilizing Dragon Dictation: Part of this note may be an electronic transcription/translation of spoken language to printed text using the Dragon Dictation System.

## 2024-09-03 NOTE — PROGRESS NOTES
[facility]  COPD Management       Mayra Hays is a 79 y.o. female seen in the [facility] for COPD. Patient has had two recent ED visits for pneumonia (24 and 24) and was prescribed doxycyline and levofloxacin, along with prednisone and mucinex most recently. Of note, patient does have non-small cell lung cancer and is presently getting chemotherapy.     Patient has a poor understanding of her medications and is not sure what she takes on a daily basis. She reports that her daughter takes care of her medications and puts them in a pill box for her. She reports that she is adherent to what is in the pill box. She thinks that she only has an albuterol inhaler and has not been using Breztri.    Pt reports she is not a smoker and quit 5-6 years ago.       Past Medical History:   Diagnosis Date    Arthritis     CHF (congestive heart failure)     Chronic anticoagulation     Eliquis    Chronic kidney disease     stage II/IIIa    Collapsed lung     COPD (chronic obstructive pulmonary disease)     Diabetes mellitus     TYPE 2    Elevated cholesterol     GERD (gastroesophageal reflux disease)     Hyperlipidemia     Hypertension     Non-small cell lung cancer RUL     Sleep apnea     non compliant with CPAP    Stroke 2019    Wears eyeglasses      Social History     Socioeconomic History    Marital status: Single    Number of children: 6   Tobacco Use    Smoking status: Former     Current packs/day: 0.00     Average packs/day: 3.0 packs/day for 16.7 years (50.2 ttl pk-yrs)     Types: Cigarettes     Start date: 4/3/1998     Quit date: 2015     Years since quittin.6     Passive exposure: Past    Smokeless tobacco: Never   Vaping Use    Vaping status: Never Used   Substance and Sexual Activity    Alcohol use: No    Drug use: No    Sexual activity: Never     Paclitaxel    Current Outpatient Medications:     albuterol sulfate  (90 Base) MCG/ACT inhaler, Inhale 2 puffs Every  4 (Four) Hours As Needed for Wheezing., Disp: 18 g, Rfl: 5    apixaban (ELIQUIS) 5 MG tablet tablet, Take 1 tablet by mouth Every 12 (Twelve) Hours., Disp: 180 tablet, Rfl: 3    atorvastatin (LIPITOR) 20 MG tablet, Take 1 tablet by mouth Daily., Disp: , Rfl:     Budeson-Glycopyrrol-Formoterol (Breztri Aerosphere) 160-9-4.8 MCG/ACT aerosol inhaler, Inhale 2 puffs 2 (Two) Times a Day., Disp: 10.7 g, Rfl: 6    esomeprazole (nexIUM) 20 MG capsule, Take 1 capsule by mouth Daily., Disp: , Rfl:     hydrOXYzine pamoate (VISTARIL) 25 MG capsule, Take 1 capsule by mouth Every Night., Disp: , Rfl:     ipratropium-albuterol (DUO-NEB) 0.5-2.5 mg/3 ml nebulizer, USE 1 VIAL IN NEBULIZER 4 TIMES DAILY - and as needed, Disp: 320 mL, Rfl: 11    lisinopril (PRINIVIL,ZESTRIL) 5 MG tablet, Take 1 tablet by mouth Daily., Disp: 90 tablet, Rfl: 3    metFORMIN (GLUCOPHAGE) 500 MG tablet, Take 1 tablet by mouth 2 (Two) Times a Day As Needed (only takes if blood glucose is above 200). As needed, Disp: , Rfl:     prochlorperazine (COMPAZINE) 10 MG tablet, Take 1 tablet by mouth Every 6 (Six) Hours As Needed for Nausea or Vomiting., Disp: 30 tablet, Rfl: 1    spironolactone (ALDACTONE) 25 MG tablet, Take 1 tablet by mouth Daily., Disp: , Rfl:     lidocaine-prilocaine (EMLA) 2.5-2.5 % cream, Apply to port-a-cath site 30 minutes prior to arrival at infusion center. Cover with plastic wrap. (Patient not taking: Reported on 9/3/2024), Disp: 30 g, Rfl: 1  No current facility-administered medications for this encounter.    Facility-Administered Medications Ordered in Other Encounters:     heparin injection 500 Units, 500 Units, Intravenous, PRN, Bailee Gan, APRN, 500 Units at 01/26/24 1558    sodium chloride 0.9 % flush 10 mL, 10 mL, Intravenous, PRN, Bailee Gan APRN      Vaccination Status   COVID 19: not interested in   Influenza: refused 2024; not interested in   Pneumococcal: not interested in   Zoster: not interested in  [Time Spent: ___ minutes] : I have spent [unfilled] minutes of time on the encounter.     Drug-Drug Interactions: CNS depressants- Compazine + Hydroxyzine- reports dizziness when she has not eaten    Drug-Disease Interactions (non-cardioselective beta blockers): N/A    Patient Assistance: No issues; Fills COPD medications in apothecary    Inhaler Technique Observed? No  If yes, notes:     Treatment Goals: Risk Reduction and Symptom Control     Medication Assessment & Plan:     I called [facility] and confirmed that the last time she filled Breztri was on 7/31/24 for a 30 day supply. Will fill COPD medications in apothecary from now on and mail to patient to help with adherence.    Jocelyn to restart Breztri 2 puffs BID. Patient will also continue PRN albuterol HFA and DuoNeb    Thank you for allowing me to participate in the care of your patient,    Maylin Tariq, PharmD  9/3/2024  14:15 EDT

## 2024-09-05 ENCOUNTER — DOCUMENTATION (OUTPATIENT)
Dept: ONCOLOGY | Facility: CLINIC | Age: 79
End: 2024-09-05
Payer: MEDICARE

## 2024-09-05 NOTE — PROGRESS NOTES
SS received call from patient requesting information on travel assistance. Patient to have daughter to contact  on lunch break.    SS will follow.

## 2024-09-11 ENCOUNTER — DOCUMENTATION (OUTPATIENT)
Dept: ONCOLOGY | Facility: CLINIC | Age: 79
End: 2024-09-11
Payer: MEDICARE

## 2024-09-12 ENCOUNTER — LAB (OUTPATIENT)
Dept: ONCOLOGY | Facility: HOSPITAL | Age: 79
End: 2024-09-12
Payer: MEDICARE

## 2024-09-12 ENCOUNTER — OFFICE VISIT (OUTPATIENT)
Dept: ONCOLOGY | Facility: CLINIC | Age: 79
End: 2024-09-12
Payer: MEDICARE

## 2024-09-12 ENCOUNTER — INFUSION (OUTPATIENT)
Dept: ONCOLOGY | Facility: HOSPITAL | Age: 79
End: 2024-09-12
Payer: MEDICARE

## 2024-09-12 VITALS
RESPIRATION RATE: 18 BRPM | HEART RATE: 72 BPM | SYSTOLIC BLOOD PRESSURE: 149 MMHG | TEMPERATURE: 97.5 F | DIASTOLIC BLOOD PRESSURE: 65 MMHG | OXYGEN SATURATION: 91 %

## 2024-09-12 DIAGNOSIS — C34.11 PRIMARY CANCER OF RIGHT UPPER LOBE OF LUNG: ICD-10-CM

## 2024-09-12 DIAGNOSIS — C34.91 NON-SMALL CELL CANCER OF RIGHT LUNG: ICD-10-CM

## 2024-09-12 DIAGNOSIS — R53.83 OTHER FATIGUE: ICD-10-CM

## 2024-09-12 DIAGNOSIS — D50.9 IRON DEFICIENCY ANEMIA, UNSPECIFIED IRON DEFICIENCY ANEMIA TYPE: Primary | ICD-10-CM

## 2024-09-12 DIAGNOSIS — D50.9 IRON DEFICIENCY ANEMIA, UNSPECIFIED IRON DEFICIENCY ANEMIA TYPE: ICD-10-CM

## 2024-09-12 DIAGNOSIS — Z29.89 IMMUNOTHERAPY ENCOUNTER: ICD-10-CM

## 2024-09-12 DIAGNOSIS — C34.91 NON-SMALL CELL CANCER OF RIGHT LUNG: Primary | ICD-10-CM

## 2024-09-12 DIAGNOSIS — Z95.828 PORT-A-CATH IN PLACE: ICD-10-CM

## 2024-09-12 LAB
ALBUMIN SERPL-MCNC: 4.1 G/DL (ref 3.5–5.2)
ALBUMIN/GLOB SERPL: 1.2 G/DL
ALP SERPL-CCNC: 107 U/L (ref 39–117)
ALT SERPL W P-5'-P-CCNC: 10 U/L (ref 1–33)
ANION GAP SERPL CALCULATED.3IONS-SCNC: 11.2 MMOL/L (ref 5–15)
AST SERPL-CCNC: 16 U/L (ref 1–32)
BASOPHILS # BLD AUTO: 0.05 10*3/MM3 (ref 0–0.2)
BASOPHILS NFR BLD AUTO: 0.8 % (ref 0–1.5)
BILIRUB SERPL-MCNC: 0.4 MG/DL (ref 0–1.2)
BUN SERPL-MCNC: 17 MG/DL (ref 8–23)
BUN/CREAT SERPL: 16.8 (ref 7–25)
CALCIUM SPEC-SCNC: 9.5 MG/DL (ref 8.6–10.5)
CHLORIDE SERPL-SCNC: 101 MMOL/L (ref 98–107)
CO2 SERPL-SCNC: 25.8 MMOL/L (ref 22–29)
CREAT SERPL-MCNC: 1.01 MG/DL (ref 0.57–1)
DEPRECATED RDW RBC AUTO: 43.2 FL (ref 37–54)
EGFRCR SERPLBLD CKD-EPI 2021: 57.1 ML/MIN/1.73
EOSINOPHIL # BLD AUTO: 0.27 10*3/MM3 (ref 0–0.4)
EOSINOPHIL NFR BLD AUTO: 4.5 % (ref 0.3–6.2)
ERYTHROCYTE [DISTWIDTH] IN BLOOD BY AUTOMATED COUNT: 14.1 % (ref 12.3–15.4)
FERRITIN SERPL-MCNC: 63.2 NG/ML (ref 13–150)
GLOBULIN UR ELPH-MCNC: 3.4 GM/DL
GLUCOSE SERPL-MCNC: 219 MG/DL (ref 65–99)
HCT VFR BLD AUTO: 37.5 % (ref 34–46.6)
HGB BLD-MCNC: 11.8 G/DL (ref 12–15.9)
IMM GRANULOCYTES # BLD AUTO: 0.03 10*3/MM3 (ref 0–0.05)
IMM GRANULOCYTES NFR BLD AUTO: 0.5 % (ref 0–0.5)
IRON 24H UR-MRATE: 62 MCG/DL (ref 37–145)
IRON SATN MFR SERPL: 16 % (ref 20–50)
LYMPHOCYTES # BLD AUTO: 1.32 10*3/MM3 (ref 0.7–3.1)
LYMPHOCYTES NFR BLD AUTO: 21.8 % (ref 19.6–45.3)
MCH RBC QN AUTO: 26.6 PG (ref 26.6–33)
MCHC RBC AUTO-ENTMCNC: 31.5 G/DL (ref 31.5–35.7)
MCV RBC AUTO: 84.7 FL (ref 79–97)
MONOCYTES # BLD AUTO: 0.45 10*3/MM3 (ref 0.1–0.9)
MONOCYTES NFR BLD AUTO: 7.4 % (ref 5–12)
NEUTROPHILS NFR BLD AUTO: 3.93 10*3/MM3 (ref 1.7–7)
NEUTROPHILS NFR BLD AUTO: 65 % (ref 42.7–76)
NRBC BLD AUTO-RTO: 0 /100 WBC (ref 0–0.2)
PLATELET # BLD AUTO: 199 10*3/MM3 (ref 140–450)
PMV BLD AUTO: 10.7 FL (ref 6–12)
POTASSIUM SERPL-SCNC: 3.9 MMOL/L (ref 3.5–5.2)
PROT SERPL-MCNC: 7.5 G/DL (ref 6–8.5)
RBC # BLD AUTO: 4.43 10*6/MM3 (ref 3.77–5.28)
SODIUM SERPL-SCNC: 138 MMOL/L (ref 136–145)
TIBC SERPL-MCNC: 386 MCG/DL (ref 298–536)
TRANSFERRIN SERPL-MCNC: 259 MG/DL (ref 200–360)
WBC NRBC COR # BLD AUTO: 6.05 10*3/MM3 (ref 3.4–10.8)

## 2024-09-12 PROCEDURE — 84466 ASSAY OF TRANSFERRIN: CPT | Performed by: INTERNAL MEDICINE

## 2024-09-12 PROCEDURE — G2211 COMPLEX E/M VISIT ADD ON: HCPCS | Performed by: INTERNAL MEDICINE

## 2024-09-12 PROCEDURE — 99214 OFFICE O/P EST MOD 30 MIN: CPT | Performed by: INTERNAL MEDICINE

## 2024-09-12 PROCEDURE — 83540 ASSAY OF IRON: CPT | Performed by: INTERNAL MEDICINE

## 2024-09-12 PROCEDURE — 82728 ASSAY OF FERRITIN: CPT | Performed by: INTERNAL MEDICINE

## 2024-09-12 PROCEDURE — 85025 COMPLETE CBC W/AUTO DIFF WBC: CPT

## 2024-09-12 PROCEDURE — 80053 COMPREHEN METABOLIC PANEL: CPT

## 2024-09-12 PROCEDURE — 96413 CHEMO IV INFUSION 1 HR: CPT

## 2024-09-12 PROCEDURE — 25010000002 HEPARIN LOCK FLUSH PER 10 UNITS: Performed by: NURSE PRACTITIONER

## 2024-09-12 PROCEDURE — 25810000003 SODIUM CHLORIDE 0.9 % SOLUTION: Performed by: INTERNAL MEDICINE

## 2024-09-12 PROCEDURE — 25810000003 SODIUM CHLORIDE 0.9 % SOLUTION 250 ML FLEX CONT: Performed by: INTERNAL MEDICINE

## 2024-09-12 PROCEDURE — 1126F AMNT PAIN NOTED NONE PRSNT: CPT | Performed by: INTERNAL MEDICINE

## 2024-09-12 PROCEDURE — 25010000002 DURVALUMAB 500 MG/10ML SOLUTION 10 ML VIAL: Performed by: INTERNAL MEDICINE

## 2024-09-12 RX ORDER — HEPARIN SODIUM (PORCINE) LOCK FLUSH IV SOLN 100 UNIT/ML 100 UNIT/ML
500 SOLUTION INTRAVENOUS AS NEEDED
OUTPATIENT
Start: 2024-09-12

## 2024-09-12 RX ORDER — HEPARIN SODIUM (PORCINE) LOCK FLUSH IV SOLN 100 UNIT/ML 100 UNIT/ML
300 SOLUTION INTRAVENOUS ONCE
OUTPATIENT
Start: 2024-09-12

## 2024-09-12 RX ORDER — SODIUM CHLORIDE 0.9 % (FLUSH) 0.9 %
20 SYRINGE (ML) INJECTION AS NEEDED
OUTPATIENT
Start: 2024-09-12

## 2024-09-12 RX ORDER — HEPARIN SODIUM (PORCINE) LOCK FLUSH IV SOLN 100 UNIT/ML 100 UNIT/ML
500 SOLUTION INTRAVENOUS AS NEEDED
Status: DISCONTINUED | OUTPATIENT
Start: 2024-09-12 | End: 2024-09-12 | Stop reason: HOSPADM

## 2024-09-12 RX ORDER — SODIUM CHLORIDE 0.9 % (FLUSH) 0.9 %
10 SYRINGE (ML) INJECTION AS NEEDED
OUTPATIENT
Start: 2024-09-12

## 2024-09-12 RX ORDER — SODIUM CHLORIDE 0.9 % (FLUSH) 0.9 %
10 SYRINGE (ML) INJECTION AS NEEDED
Status: DISCONTINUED | OUTPATIENT
Start: 2024-09-12 | End: 2024-09-12 | Stop reason: HOSPADM

## 2024-09-12 RX ORDER — SODIUM CHLORIDE 9 MG/ML
250 INJECTION, SOLUTION INTRAVENOUS ONCE
Status: COMPLETED | OUTPATIENT
Start: 2024-09-12 | End: 2024-09-12

## 2024-09-12 RX ADMIN — HEPARIN 500 UNITS: 100 SYRINGE at 15:46

## 2024-09-12 RX ADMIN — SODIUM CHLORIDE 250 ML: 9 INJECTION, SOLUTION INTRAVENOUS at 14:35

## 2024-09-12 RX ADMIN — Medication 10 ML: at 15:46

## 2024-09-12 RX ADMIN — SODIUM CHLORIDE 1000 MG: 9 INJECTION, SOLUTION INTRAVENOUS at 14:37

## 2024-09-12 NOTE — PROGRESS NOTES
Subjective     Date: 2024    Referring Provider  No ref. provider found    Chief Complaint  Symptomatic anemia   2. Non small Cell Lung Cancer  Cancer Staging   Stage IIIA (cT1c, cN2, cM0)        Subjective      Mayra Hays is a 79 y.o. female who presents today to [facility] for follow up.    HPI:   79 y.o. female with past medical history of diabetes mellitus type 2, hyperlipidemia, COPD, hypertension, atrial fibrillation on anticoagulation (Eliquis), h/o CVA and previous tobacco use presents for symptomatic anemia and NSCLC.    She is present today with her daughter, Hailey, who is assisting with history. Pt started work up for RUL lung nodule , since then noticed to have a decrease in her Hgb to 10.7 from normal in 2023. More recently, her Hgb 9 was 6.5, she was directed to ED where she received 1U PRBC.     Patient reports increased fatigue over the last several months. She reports no bleeding, however does report dark colored stool (melena) two days ago. Denies any other evidence of melena or hematochezia.     She has previously been diagnosed with iron deficiency (years ago) requiring oral iron supplements, but never received IV iron or bone marrow biopsy.    She and her daughter are unsure of her last colonoscopy and endoscopy history, think it may have occurred >5 years ago but unsure.    She denies recent weight loss, fever, chills,  night sweats.  Previous smoker, quit 5-6 years ago, smoked 3 packs/day X 30 years. Denies alcohol or drug use. Family history significant for son with leukemia, brothers with lung cancer.     Oncology History:  2023: low dose CT chest screenin.9 cm spiculated right upper lobe pulmonary nodule concerning for malignancy, PET/CT recommended.   2023: EBUS by Dr. Peter negative for malignancy for bronchial washing and FNA of station 10R  2023: PET/CT: Right upper lobe pulmonary nodule, more  posterior possible satellite nodules are postobstructive process measuring 2.1 cm with mSUV 14.5. Also with pretracheal region lymph node 2.6 cm.   6/29/2023: CT guided needle biopsy was non diagnostic, showed fibrosis and chronic inflammation.   8/9/2023: Lab work showed anemia with Hgb 6.5. Referred to ED. Bronchoscopy deferred.   9/6/2023: Navigational bronchoscopy performed by Dr. Clemens- Right upper lobe lung transbronchial biopsy revealed squamous cell carcinoma, lymph node at station 4R also involved with squamous cell carcinoma. PD-L1 TPS 60%. Patient not deemed a surgical candidate.  9/13-11/21/2023: Received weekly carboplatin  X 7 and radiation. Patient had a reaction to paclitaxel, it was omitted.   12/19/2023: Post treatment CT chest with improvement of masslike process RUL now 1.6 cm, suggest significant response to treatment. Mediastinal lymph nodes are now within normal limits of size.     Ms. Hays presents today with her daughter, grand daughter, and great grand daughters. She is in a wheelchair. She lives alone with her grand son, able to perform her ADLs including cooking, cleaning.     Treatment history:        2.       Interval History 10/11/2023   Ms. Hays presents for follow up today, accompanied by her daughter in law. She started treatment with paclitaxel and carboplatin on 10/9, unfortunately had a anaphylactic reaction to taxol after 8 minutes of infusion causing her to go to the ED. Patient was awake and feeling back to herself at the time of ED visit. Carboplatin was not administered.     She reports doing well overall, no new symptoms. Started radiation therapy yesterday.      Interval History 10/24/2023   Ms. Hays presents for cycle 2 of carboplatin with concurrent radiation. Did well with first cycle. Denies any adverse effects including nausea, vomiting, diarrhea, neuropathy. Appetite is good. Radiation is going well. No complaints today.  Denies any skin rash or  bites.    Interval History 11/21/2023   Ms. Hays presents today for cycle 7 of carboplatin with concurrent radiation. She reports good tolerance thus far without neuropathy, nausea, vomiting, diarrhea. Appetite is stable.   Denies any GIB.    Interval History 01/11/2024   Ms. Hays presents after completion of therapy. She was admitted to the hospital 12/31 due to shortness of breath, found to have Afib with RVR and coronavirus HKU. She received inpatient antibiotics and steroids. Currently with improvement, still reports some fatigue.   We reviewed her last CT chest, which shows improvement after treatment. Discussed continuing immunotherapy.      Interval History 02/22/2024   Ms. Hays presents today for follow, prior to cycle 3 of maintenance durvalumab. Accompanied by her grand daughter's fiance. She reports improved energy. Denies any bleeding, nausea, vomiting, diarrhea, rash. On examination, noted to have petechia on bilateral distal lower extremities and R distal upper extremities, she believes these started today. Denies pruritus.     Interval History 03/21/2024    presents prior to C5 of consolidation therapy with durvalumab. She reports good tolerance to the last treatment, denies shortness of breath, diarrhea, nausea/vomiting, fatigue. She's been applying lotion to her legs, petechia has decreased, primarily just on her left leg now.     Interval History 04/18/2024   Ms. Hays presents prior to C7 of durvalumab. Reports good tolerance without nausea, vomiting, diarrhea. No changes in petechia. Denies any other complaints.    Interval History 06/13/2024   Ms. Hays presents for follow up prior to cycle 11 of durvalumab. She presented to ED 6/6 due to shortness of breath and cough. CXR showed RUL and left basilar airspace disease for which she received levofloxacin. Today with improvement in symptoms. Denies diarrhea, nausea, vomiting.     Interval History 07/18/2024   Ms. Hays presents for  follow-up, prior to cycle 13 of consolidation Durvalumab. She reports good tolerance, denies any cough/shortness of breath, NVD. Continues to have non pruritic petechia on her legs.     Interval History 08/15/2024   Ms. Hays presents for follow up, prior to C 15 of Durvalumab. Surveillance CT chest 8/1 shows airspace disease with volume loss right upper lobe similar to prior exam, felt to reflect post treatment related change.   Denies any new symptoms, continues to do well with tx.    Interval History 08/29/2024  Ms. Hays presents today for follow up, prior to cycle 16 Durvalumab. Overall, she reports that she has been doing well since her last visit. She continues to tolerate the treatments well and without any noticeable side effects. She reports a good appetite, hydrating well. Denies any nausea/vomiting or diarrhea. No rash. She is without any specific complaints at this time.     Interval History 09/12/2024   Ms. Hays presents for follow up, prior to C17 of consolidation therapy with durvalumab. She reports doing well without nausea, vomiting, diarrhea. No changes in breathing or cough. Continues to use intermittent oxygen. No new symptoms.       Objective     Objective     Allergy:   Allergies   Allergen Reactions    Paclitaxel Anaphylaxis        Current Medications:   Current Outpatient Medications   Medication Sig Dispense Refill    albuterol sulfate  (90 Base) MCG/ACT inhaler Inhale 2 puffs by mouth Every 4 (Four) Hours As Needed for Wheezing. 18 g 6    apixaban (ELIQUIS) 5 MG tablet tablet Take 1 tablet by mouth Every 12 (Twelve) Hours. 180 tablet 3    atorvastatin (LIPITOR) 20 MG tablet Take 1 tablet by mouth Daily.      Budeson-Glycopyrrol-Formoterol (Breztri Aerosphere) 160-9-4.8 MCG/ACT aerosol inhaler Inhale 2 puffs by mouth 2 (Two) Times a Day. 10.7 g 6    esomeprazole (nexIUM) 20 MG capsule Take 1 capsule by mouth Daily.      hydrOXYzine pamoate (VISTARIL) 25 MG capsule Take 1 capsule by  mouth Every Night.      ipratropium-albuterol (DUO-NEB) 0.5-2.5 mg/3 ml nebulizer Take 3 mL by nebulization Every 4 (Four) Hours As Needed for Wheezing or Shortness of Air. 320 mL 3    lidocaine-prilocaine (EMLA) 2.5-2.5 % cream Apply to port-a-cath site 30 minutes prior to arrival at infusion center. Cover with plastic wrap. (Patient not taking: Reported on 9/3/2024) 30 g 1    lisinopril (PRINIVIL,ZESTRIL) 5 MG tablet Take 1 tablet by mouth Daily. 90 tablet 3    metFORMIN (GLUCOPHAGE) 500 MG tablet Take 1 tablet by mouth 2 (Two) Times a Day As Needed (only takes if blood glucose is above 200). As needed      prochlorperazine (COMPAZINE) 10 MG tablet Take 1 tablet by mouth Every 6 (Six) Hours As Needed for Nausea or Vomiting. 30 tablet 1    spironolactone (ALDACTONE) 25 MG tablet Take 1 tablet by mouth Daily.       No current facility-administered medications for this visit.     Facility-Administered Medications Ordered in Other Visits   Medication Dose Route Frequency Provider Last Rate Last Admin    Durvalumab 1,000 mg in sodium chloride 0.9 % 270 mL chemo IVPB  1,000 mg Intravenous Once Shawna Bond  mL/hr at 09/12/24 1437 1,000 mg at 09/12/24 1437    heparin injection 500 Units  500 Units Intravenous PRN Bailee Gan APRN   500 Units at 01/26/24 1558    heparin injection 500 Units  500 Units Intravenous PRN Bailee Gan APRN        sodium chloride 0.9 % flush 10 mL  10 mL Intravenous PRN Bailee Gan APRN        sodium chloride 0.9 % flush 10 mL  10 mL Intravenous PRN Bailee Gan APRN           Past Medical History:  Past Medical History:   Diagnosis Date    Arthritis     CHF (congestive heart failure)     Chronic anticoagulation     Eliquis    Chronic kidney disease     stage II/IIIa    Collapsed lung     COPD (chronic obstructive pulmonary disease)     Diabetes mellitus     TYPE 2    Elevated cholesterol     GERD (gastroesophageal reflux disease)      Hyperlipidemia     Hypertension     Non-small cell lung cancer RUL     Sleep apnea     non compliant with CPAP    Stroke 2019    Wears eyeglasses        Past Surgical History:  Past Surgical History:   Procedure Laterality Date    BRONCHOSCOPY Bilateral 2023    Procedure: BRONCHOSCOPY WITH ENDOBRONCHIAL ULTRASOUND;  Surgeon: Abdulkadir Peter MD;  Location:  [facility];  Service: Pulmonary;  Laterality: Bilateral;    BRONCHOSCOPY WITH ION ROBOTIC ASSIST N/A 2023    Procedure: BRONCHOSCOPY WITH ION ROBOT AND EBUS;  Surgeon: John Clemens MD;  Location:  [facility];  Service: Robotics - Pulmonary;  Laterality: N/A;  Ion cath #6 - 0032,  #6 - 0030, cath guide # 0077. EBUS scope removed with balloon intact.    CARDIAC CATHETERIZATION N/A 2017    Procedure: Left Heart Cath;  Surgeon: Jatinder Matias MD;  Location:  [facility]ATH INVAS[facility];  Service:     CARDIAC CATHETERIZATION N/A 2021    Procedure: Left Heart Cath;  Surgeon: Tolu Steinberg MD;  Location: [facility] CATH INVASIVE LOC[facility];  Service: Cardiology;  Laterality: N/A;    COLONOSCOPY      ENDOSCOPY      GALLBLADDER SURGERY      PORTACATH PLACEMENT N/A 2023    Procedure: INSERTION OF PORTACATH;  Surgeon: Petr Velásquez MD;  Location:  [facility];  Service: General;  Laterality: N/A;    VENTRAL HERNIA REPAIR N/A 2020    Procedure: VENTRAL HERNIA REPAIR LAPAROSCOPIC WITH DAVINCI ROBOT;  Surgeon: Petr Velásquez MD;  Location:  [facility];  Service: DaVinci;  Laterality: N/A;       Social History:  Social History     Socioeconomic History    Marital status: Single    Number of children: 6   Tobacco Use    Smoking status: Former     Current packs/day: 0.00     Average packs/day: 3.0 packs/day for 16.7 years (50.2 ttl pk-yrs)     Types: Cigarettes     Start date: 4/3/1998     Quit date: 2015     Years since quittin.7     Passive exposure: Past    Smokeless tobacco: Never   Vaping Use    Vaping status:  Never Used   Substance and Sexual Activity    Alcohol use: No    Drug use: No    Sexual activity: Never         Family History:  Family History   Problem Relation Age of Onset    Heart disease Mother         Rhianna sophia    Heart disease Father     Heart attack Father     Heart failure Father        Review of Systems:  Review of Systems   Constitutional:  Positive for fatigue.   All other systems reviewed and are negative.      Vital Signs:   There were no vitals taken for this visit.     Physical Exam:  Physical Exam  Vitals reviewed.   Constitutional:       General: She is not in acute distress.     Appearance: Normal appearance. She is obese. She is not ill-appearing.      Comments: +hard of hearing   HENT:      Head: Normocephalic and atraumatic.      Mouth/Throat:      Mouth: Mucous membranes are moist.      Pharynx: Oropharynx is clear.   Eyes:      Conjunctiva/sclera: Conjunctivae normal.      Pupils: Pupils are equal, round, and reactive to light.   Cardiovascular:      Rate and Rhythm: Normal rate and regular rhythm.      Heart sounds: No murmur heard.  Pulmonary:      Effort: Pulmonary effort is normal. No respiratory distress.      Breath sounds: Wheezing present.   Chest:      Comments: +R chest port  Abdominal:      General: Abdomen is flat. Bowel sounds are normal. There is no distension.      Palpations: Abdomen is soft. There is no mass.      Tenderness: There is no abdominal tenderness. There is no guarding.   Musculoskeletal:         General: No swelling. Normal range of motion.      Cervical back: Normal range of motion.   Lymphadenopathy:      Cervical: No cervical adenopathy.   Skin:     General: Skin is warm and dry.      Findings: Petechiae present.      Comments: LLE    Neurological:      General: No focal deficit present.      Mental Status: She is alert and oriented to person, place, and time. Mental status is at baseline.   Psychiatric:         Mood and Affect: Mood normal.         PHQ-9  Score  PHQ-9 Total Score:       Pain Score  There were no vitals filed for this visit.                                  PAINSCOREQUALITYMETRIC:   There were no vitals filed for this visit.                         Lab Review  Lab Results   Component Value Date    WBC 6.05 09/12/2024    HGB 11.8 (L) 09/12/2024    HCT 37.5 09/12/2024    MCV 84.7 09/12/2024    RDW 14.1 09/12/2024     09/12/2024    NEUTRORELPCT 65.0 09/12/2024    LYMPHORELPCT 21.8 09/12/2024    MONORELPCT 7.4 09/12/2024    EOSRELPCT 4.5 09/12/2024    BASORELPCT 0.8 09/12/2024    NEUTROABS 3.93 09/12/2024    LYMPHSABS 1.32 09/12/2024     Lab Results   Component Value Date     09/12/2024    K 3.9 09/12/2024    CO2 25.8 09/12/2024     09/12/2024    BUN 17 09/12/2024    CREATININE 1.01 (H) 09/12/2024    EGFRIFNONA 51 (L) 11/22/2021    GLUCOSE 219 (H) 09/12/2024    CALCIUM 9.5 09/12/2024    ALKPHOS 107 09/12/2024    AST 16 09/12/2024    ALT 10 09/12/2024    BILITOT 0.4 09/12/2024    ALBUMIN 4.1 09/12/2024    PROTEINTOT 7.5 09/12/2024    MG 2.2 01/03/2024    PHOS 3.5 05/18/2020     Lab Results   Component Value Date    RETICCTPCT 4.26 (H) 08/16/2023     Lab Results   Component Value Date    FERRITIN 63.20 09/12/2024    IRON 62 09/12/2024    TIBC 386 09/12/2024    LABIRON 16 (L) 09/12/2024    CPRHGZQM12 548 08/14/2023    FOLATE 12.90 08/14/2023        Radiology Results    CT Chest With Contrast Diagnostic (08/01/2024 14:14)     FINDINGS:  Soft tissue windows demonstrate no adenopathy within the chest. The  thyroid gland is unremarkable. The heart is normal in size. The aorta is  normal in caliber. There are no pleural or pericardial effusions. Lung  windows redemonstrate airspace disease in the right upper lobe with  volume loss felt to reflect posttreatment related change. Scarring is  present in the anterior left upper lobe and right middle lobe. There is  airspace disease in both lung bases similar in appearance to the prior  exam.. The  visualized upper abdomen is unremarkable. Bone windows reveal  no lytic or destructive lesions.     IMPRESSION:  Airspace disease with volume loss in the right upper lobe  similar in appearance to the prior exam felt to reflect posttreatment  related change. Continued follow-up is recommended.      CT Chest With Contrast Diagnostic (05/01/2024 09:01)     IMPRESSION:  1. Development of airspace disease in the right upper lobe which is in  the region of a previously noted spiculated nodule likely reflecting  posttreatment related change.  2. Interval improvement in the previously noted bilateral lower lobe  airspace disease with a few patchy opacities remaining.    CT Chest Without Contrast Diagnostic (12/31/2023 20:30)   IMPRESSION:  Spiculated nodule of the right upper lobe concerning for neoplasm.  Masslike consolidation in the right lower lobe could represent pneumonia  with underlying mass not excluded. Atelectasis/airspace disease in the  left lower lobe and lingula. Mediastinal adenopathy. Follow-up  recommended.    CT Chest With Contrast Diagnostic (12/19/2023 14:47)     IMPRESSION:  1.  Significant decrease size and masslike appearance of process in the  right upper lobe which would suggest significant response to treatment.  2.  No suspicious pulmonary nodules or masses identified.  3.  Mediastinal lymph nodes are now within normal limits for size.  No  hilar or mediastinal adenopathy.  4.  Mild centrilobular nodularity of the left lower lobe which may  reflect changes of atypical pneumonia or aspiration pneumonitis.  5.  Stable cardiomegaly.  6.  Other incidental/nonacute findings above.      CT Angiogram Chest Pulmonary Embolism (10/09/2023 16:41)   MPRESSION:  1.  Right upper lobe mass in association with pneumonia has increased in  size and extent when compared to the previous exam with extension to the  pleural surface. Masslike component is approximately 56.8 x 44.5 mm and  was previously 40.1 x 36.7  mm. This would correspond to primary  malignancy.  2.  Central bronchial wall thickening. Can be seen with reactive airway  disease or bronchitis.  3.  1.3 cm left adrenal nodule is noted. No significant change from  previous.  4.  Increased size of paratracheal and mediastinal lymph nodes. A right  paratracheal lymph node is 2.5 cm and was previously 2.2 cm.  5. No PE identified.  6. Marked cardiomegaly, stable.  7. Otherwise stable chest with other nonacute/incidental findings as  above.    CT Head Without Contrast (10/09/2023 12:00)   IMPRESSION:    Unremarkable exam demonstrating no CT evidence of acute intracranial  findings.      CT Chest Without Contrast Diagnostic (08/09/2023 12:38)       NM PET/CT Skull Base to Mid Thigh (06/20/2023 11:12)         CT Chest Low Dose Cancer Screening WO (02/14/2023 12:58)   IMPRESSION:    Interval development or enlargement of a 1.9 cm spiculated right upper  lobe pulmonary nodule concerning in appearance for malignancy and PET/CT  is recommended for further evaluation.        Pathology:     Tissue Pathology Exam (09/06/2023 08:13)         6/29/23           Assessment / Plan         Assessment and Plan   Mayra Hays is a 79 y.o. year old presents for       Non small cell lung cancer, squamous cell    Cancer Staging   Stage IIIA (cT1c, cN2, cM0)  -Oncology history as above. Patient with 1.9 cm spiculated right upper lobe nodule on low dose CT chest screen (2/2023). EBUS by Dr. Peter negative for malignancy (2/2023). PET/CT with hypermetabolism RUL 2.1 cm and pretracheal region lymph node (6/2023). CT guided biopsy negative for malignancy (6/2023). Navigational bronchoscopy with positive RUL and  4R (paratracheal) for squamous cell (9/6/2023). PD-L1 TPS score 60%.   -Completed weekly carboplatin AUC2. Patient experienced anaphylaxis 8 minutes into the paclitaxel on first cycle 10/9. Due to this, paclitaxel will be omitted from weekly chemotherapy.  -Radiation therapy 10/10;  6000 cGy in 30 treatment fractions. Complete 11/21/2023  -Post treatment CT 12/19/23 with good/partial response  -Cont consolidation therapy with durvalumab every 2 weeks; C17/26 today. Repeat CT chest 11/2024    2. Anemia; iron deficiency anemia due to GIB  - Patient with normal H/H in our system 2/2023, with acute drop in Hgb 6/29 (10.7) to Hgb (6.5) on 8/9 requiring transfusion. Patient reports one episode of melena two days ago (she is a poor historian).   -Last colonoscopy and endoscopy are unknown; think it may have been >5 years ago  -CBC from 8/14 show Hgb 8.2, Hct 27, MCV 90. Normal WBC and platelet count. Iron studies with normal iron (37), TIBC (435), and low iron saturation (9). This is in light of recent blood transfusion. Normal folate and B12 level. Reticulocyte count noted to be elevated to 6%  -Patient is with fatigue. Denies shortness of breath (aside from baseline COPD) or chest pain. Continues to be on Eliquis for Afib  -Initial work up from consultation confirmed iron deficiency anemia, normal folate/b12 levels. No indication of hemolysis seen with normal LDH and haptoglobin. Myeloma work up has been negative with no M spike on serum protein electrophoresis and normal k/l free light chain ratio. Peripheral smear with normal total WBC without granulocytic dysplasia or blasts.  -Received Ferumoxytol 8/29/2023 and 11/27/23  -Pt to see surgery post treatment for repeat EGD and Colonoscopy     3. Malignancy associated pain  - Currently denies    4. Surveillance (per NCCN)  -Surveillance would include CT with and without contrast every 3 to 6 months for 3 years, then every 6 months for 2 years, then low-dose noncontrast CT annually.   Next CT 11/2024    5. Petechia of lower extremities   - Normal Plt counts  - Refer to dermatology     Discussed possible differential diagnoses, testing, treatment, recommended non-pharmacological interventions, risks, warning signs to monitor for that would indicate  need for follow-up in clinic or ER. If no improvement with these regimens or you have new or worsening symptoms follow-up. Patient verbalizes understanding and agreement with plan of care. Denies further needs or concerns.     Patient was given instructions and counseling regarding her condition and for health maintenance advised.       All questions were answered to her satisfaction.              Meds ordered during this visit  No orders of the defined types were placed in this encounter.      Visit Diagnoses    ICD-10-CM ICD-9-CM   1. Iron deficiency anemia, unspecified iron deficiency anemia type  D50.9 280.9   2. Non-small cell cancer of right lung  C34.91 162.9   3. Immunotherapy encounter  Z29.89 V07.2                             Follow Up   In 1 month with tx with CBC, iron studies, ferritin, CMP        This document has been electronically signed by Shawna Bond MD   September 12, 2024 14:41 EDT    Dictated Utilizing Dragon Dictation: Part of this note may be an electronic transcription/translation of spoken language to printed text using the Dragon Dictation System.

## 2024-09-19 DIAGNOSIS — C34.11 PRIMARY CANCER OF RIGHT UPPER LOBE OF LUNG: ICD-10-CM

## 2024-09-19 DIAGNOSIS — C34.91 NON-SMALL CELL CANCER OF RIGHT LUNG: Primary | ICD-10-CM

## 2024-09-19 RX ORDER — SODIUM CHLORIDE 9 MG/ML
250 INJECTION, SOLUTION INTRAVENOUS ONCE
OUTPATIENT
Start: 2024-09-26

## 2024-09-19 RX ORDER — SODIUM CHLORIDE 9 MG/ML
250 INJECTION, SOLUTION INTRAVENOUS ONCE
OUTPATIENT
Start: 2024-10-10

## 2024-09-19 NOTE — TELEPHONE ENCOUNTER
Hematology Clinic Follow Up Visit    Patient Name: Oluwaseun Oluwadamilola Ogunyemi  Medical Record Number: [id number]   YOB: 1983    PCP: Clive St MD     Reason for Consultation:  Oluwaseun Oluwadamilola Ogunyemi was seen today for the diagnosis of hgb SS/sickle cell disease    Hematologic History:  *Sickle cell disease, hgb SS  -early 2000's moved from [facility] to   -2016/2017- 1st pregnancy c/b worsening pain crises  -2018- 2nd pregnancy c/b worsening pain crises, including episode of acute chest requiring RBC exchange.  -early 2019- started hydroxyurea, titrated up to 2000mg daily  -7/2020- started Voxelotor, but dc'd shortly after starting d/t poor tolerability with diarrhea, fatigue, elevated LFTs, was not very helpful either.   -5/28/21- 3/2022- received crizanlizumab (Adakveo) however c/b infusion reaction 3/2022 and was only slightly helpful therefore not continued  -10/2021- moved from [facility] (followed with Dr. Walton of [facility]) to [place].   -4/2022- started L-glutamine (Endari) 15g PO BID, cont'd hydrea  -8/2022- moved from [place] (prev followed by Dr. Esau Blount) to [place], [place]  -3/27/23- resumed crizanlizumab d/t more freq acute pain episodes   -5/8/23- reduced hydrea to 1500mg daily (d/t hgb 7.4, abs retic 47K)   -7/2/23- reduced hydrea to 1000mg daily (d/t hgb 7.0, abs retic 59K)   -12/27/23- increased Hydrea to 1500 mg daily  -4/4/24- increased Hydrea to 2000 mg daily    ================================  Interval events: Presents for follow-up and for next crizanlizumab infusion.  She has been taking Hydrea at 2000 mg daily.  She has not had any side effects with this.  She is also taking Endari 15 g twice daily and folic acid 1 mg daily.     Over the last weeks she has felt like she has been an acute pain episode.  Involving her lower back.  A couple days ago had some transient chest pain that resolved with home opiates.  She has not had any fever,  Last office note sent to Dr. Del Valle's office.    cough or increased dyspnea.  She feels like she needs additional IV opiates and IV fluids today.  At home she has continued to take MS Contin 60 mg q8hrs and taking higher dose of oxycodone IR 30 mg q3 hrs prn.  Over the last week she has needed to take oxycodone around-the-clock.  She is staying hydrated.    She has not required hospitalizations for a acute vaso-occlusive crisis since 2023.      No recent bowel or bladder changes.    She met with Dr. Aviles at [facility] in  to discuss gene therapy with Desiree.  There program will start offering this in the fall, it sounds like she is a good candidate for this.  She states that she was told that she would be hospitalized therefore 3 to 6 weeks for this.  Today she reports that she is now leaning towards pursuing this     Hospitalizations and ED visits for acute vaso-occlusive pain episodes since moving from [facility] in 2022:  -22- hospitalization  -22- ED  -22- hospitalization  10/8/22- hospitalization  -10/29/22- ED  -22- ED  -22- ED   -23- hospitalization  -23- hospitalization  -23- hospitalization    Past Medical History:  Past Medical History:    Abnormal antibody titer    Anti-C, Anti-E, Anti-K, Warm Auto Antibody    Acute chest syndrome due to sickle cell crisis (HCC)    Acute chest syndrome due to sickle-cell disease (HCC)    RBC exchange for acute chest    Chronic, continuous use of opioids    Constipation due to opioid therapy    History of transfusion    multiple blood transfusions, most of which were during pregnancies    Hypokalemia    Nausea    Sickle cell anemia (HCC)    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 90 tablet 0    OxyCODONE HCl IR 30 MG Oral Tab Take 1 tablet (30 mg total) by mouth every 3 (three) hours as needed for Pain. 180 tablet 0    ondansetron (ZOFRAN) 8 MG tablet Take 1 tablet (8 mg total) by mouth every 8 (eight) hours as needed for Nausea. 9 tablet 13    hydroxyurea 500 MG Oral Cap Take 4 capsules (2,000 mg total) by mouth daily. 360 capsule 3    folic acid 1 MG Oral Tab Take 1 tablet (1 mg total) by mouth daily. 90 tablet 3    Glutamine, Sickle Cell, (ENDARI) 5 g Oral Powd Pack Take 15 g by mouth in the morning and 15 g before bedtime. 180 each 11    Naloxone HCl 4 MG/0.1ML Nasal Liquid 4 mg by Nasal route as needed. If patient remains unresponsive, repeat dose in other nostril 2-5 minutes after first dose. 1 kit 0    metoprolol succinate ER 25 MG Oral Tablet 24 Hr Take 1 tablet (25 mg total) by mouth daily.      Cyanocobalamin (VITAMIN B-12 OR) Take 200 mcg by mouth daily.       Allergies:   Allergies   Allergen Reactions    Piperacillin Sod-Tazobactam So ITCHING       Psychosocial History:  Social History     Social History Narrative    , has 2 children ages 4 and 5 as of 9/2022.  Not employed.     Social History     Socioeconomic History    Marital status:    Tobacco Use    Smoking status: Never    Smokeless tobacco: Never   Vaping Use    Vaping status: Never Used   Substance and Sexual Activity    Alcohol use: Never     Comment: No history of excessive use    Drug use: Never    Sexual activity: Yes     Partners: Male     Birth control/protection: Condom   Social History Narrative    , has 2 children ages 4 and 5 as of 9/2022.  Not employed.     Social Determinants of Health     Financial Resource Strain: Low Risk  (4/28/2023)    Financial Resource Strain     Difficulty of Paying Living Expenses: Not very hard    Transportation Needs: No Transportation Needs (4/28/2023)    Transportation Needs     Lack of Transportation: No       Family Medical History:  Family History   Problem Relation Age of Onset    Hypertension Mother         unknown    Cataracts Mother     No Known Problems Father         was told cardiac arrest    No Known Problems Brother     Sickle Cell Maternal Aunt         passed from kidney failure    DVT/VTE Other     Breast Cancer Neg     Ovarian Cancer Neg     Colon Cancer Neg      Review of Systems:  A 10-point ROS was done with pertinent positives and negative per the HPI    Vital Signs:  Height: 168.6 cm (5' 6.38\") (09/19 1123)  Weight: 72.6 kg (160 lb) (09/19 1123)  BSA (Calculated - sq m): 1.83 sq meters (09/19 1123)  Pulse: 83 (09/19 1123)  BP: 110/75 (09/19 1123)  Temp: 97.5 °F (36.4 °C) (09/19 1123)  Do Not Use - Resp Rate: --  SpO2: 100 % (09/19 1123)    Wt Readings from Last 6 Encounters:   09/19/24 72.6 kg (160 lb)   08/22/24 72.1 kg (159 lb)   07/25/24 70.4 kg (155 lb 3.2 oz)   07/01/24 71.5 kg (157 lb 9.6 oz)   06/28/24 69.8 kg (153 lb 12.8 oz)   05/30/24 72.3 kg (159 lb 8 oz)     Physical Examination:  General: Patient is alert and oriented, not in acute distress  Psych: Mood and affect are appropriate  Eyes: EOMI  ENT: Oropharynx is clear  CV: Regular rate and rhythm, no murmurs, no LE edema  Respiratory: Lungs clear to auscultation bilaterally  GI/Abd: Soft, non-tender with normoactive bowel sounds, no hepatosplenomegaly  Neurological: Grossly intact   Lymphatics: No palpable lymphadenopathy  Skin: no rashes or petechiae    Laboratory:  Lab Results   Component Value Date    WBC 3.6 (L) 08/22/2024    WBC 3.7 (L) 07/25/2024    WBC 4.5 05/30/2024    HGB 8.5 (L) 08/22/2024    HGB 8.2 (L) 07/25/2024    HGB 8.3 (L) 05/30/2024    HCT 23.6 (L) 08/22/2024    .3 (H) 08/22/2024    MCH 41.9 (H) 08/22/2024    MCHC 36.0 08/22/2024    RDW 15.1 08/22/2024    .0 08/22/2024    .0  07/25/2024    .0 (H) 05/30/2024     Lab Results   Component Value Date     (H) 08/22/2024    BUN 8 (L) 08/22/2024    CREATSERUM 0.58 08/22/2024    CREATSERUM 0.57 07/25/2024    CREATSERUM 0.62 05/30/2024    ANIONGAP 2 08/22/2024    CA 9.5 08/22/2024    OSMOCALC 278 08/22/2024    ALKPHO 50 08/22/2024    AST 29 08/22/2024    ALT 22 08/22/2024    BILT 0.6 08/22/2024    TP 8.8 (H) 08/22/2024    ALB 4.5 08/22/2024    GLOBULIN 4.3 (H) 08/22/2024     (L) 08/22/2024    K 3.8 08/22/2024     08/22/2024    CO2 29.0 08/22/2024     Lab Results   Component Value Date     07/25/2024     12/27/2023     07/12/2023     05/08/2023     03/27/2023     02/16/2023     12/22/2022     09/05/2022     No results found for: \"PTT\", \"PT\", \"INR\"    Lab Results   Component Value Date    RETICABSOL 59.5 08/22/2024    RETICABSOL 55.2 07/25/2024    RETICABSOL 88.8 05/30/2024    RETICABSOL 116.1 04/04/2024    RETICABSOL 86.6 03/08/2024    RETICABSOL 116.0 02/21/2024    RETICABSOL 115.2 01/24/2024    RETICABSOL 178.6 (H) 12/27/2023    RETICABSOL 120.5 11/01/2023    RETICABSOL 132.5 10/23/2023     Lab Results   Component Value Date    CORINNE 279.5 (H) 02/16/2023    CORINNE 395.5 (H) 09/05/2022     Lab Results   Component Value Date    SAT 28 02/16/2023    SAT 36 09/05/2022     Lab Results   Component Value Date    B12 1,792 (H) 07/12/2023    B12 182 (L) 05/08/2023     Lab Results   Component Value Date    MMA 95 05/08/2023     Component      Latest Ref Rng & Units 9/5/2022   HEMOGLOBIN A      95.5 - 100.0 % <1.0 (L)   HEMOGLOBIN A2      1.5 - 3.5 % 1.3 (L)   HEMOGLOBIN F (FETAL)      0.0 - 2.0 % 45.0 (H)   HEMOGLOBIN S      % 53.7   HGB Electophoresis Interp       Overall, the predominant electrophoretic findings are consistent with sickle cell disease (homozygous HgB S). Although HgB F is often elevated in patients with sickle cell disease, such elevations do not commonly exceed  15% of the total hemoglobin. Possible considerations for the degree of HgB F elevation as seen in this patient may include therapy effect (Hydroxyurea) vs. hereditary persistence of fetal hemoglobin (HPFH).      Impression & Plan:     *Hgb SS/Sickle cell disease  -+hx of acute chest in 2018 requiring RBC exchange.  She reports having less than 10 transfusions in her lifetime, most of the transfusions she received were during prior pregnancies.   -She has had an excellent response with hydroxyurea with marked increase in hemoglobin F.   -Continue hydroxyurea to 2000 mg daily  -Started L-glutamine 15 g BID 4/2022, in effort to reduce frequency of acute pain episodes, so far she thinks this might be helpful, will continue this.    -restarted Crizanlizumab (adakveo) 3/16/23, tolerating well without complication.  Continue crizanlizumab 5 mg/kg q4 weeks- dose today.  The frequency of her hospitalizations and pain crises have decreased since she started this.    -Previously intolerant to Voxelotor   -Recommend Folvite 1 mg daily to be continued indefinitely  -Recommend annual ophthalmology visits  -Advised to stay up-to-date with recommended vaccinations.    -She has met with  Dr. Bryce Aviles at [facility] to discuss pursuing Casgevy gene therapy.  She is leaning towards pursuing this, I further encouraged her as her pain management and frequency of pain episodes has become less than ideal lately and she lacks other good options for better control.     *macrocytic anemia  -d/t sickle cell anemia + hydroxyurea effect + B12 def  -stable today  -Continue vitamin B-12 1000 mcg PO daily to be continued indefinitely  -continue folvite 1mg daily indefinitely  -Follow CBC    *Chronic pain management  -Continue MS Contin 60mg q8 hrs scheduled and oxycodone 30 mg IR PO q3hrs prn   -Duloxetine was not helpful    *Acute pain episode  -gave IV fluid hydration and IV dilaudid 2mg x 1 then 1mg x 1 today as below while she is in the  infusion unit today for acute pain flare.      *Management recommendations of acute pain episodes not controlled with home meds  -Hydration with IV fluids with 0.9 NS until determined to be euvolemic, then maintained with 0.5 NS as relative hypernatremia can increase RBC sickling.  -Recommend prompt management of painful symptoms with parenteral opioids- recommend starting IV Dilaudid 2mg (or IV morphine 10mg) prn up to 3 doses for additional pain relief.  If needed, a morphine PCA can be initiated on admission.  For pruritus related to opiates consider PO Benadryl or cetirizine.  I favor avoiding IV Benadryl.   -For any acute pain episode evaluation for potential infection or development of acute chest should be considered.  -standing order remains in place for IV dilaudid 1-2mg up to 3 doses prn + 1 L of 0.9 NS IVF + PO benadryl 25-50mg prn for acute pain episodes that can be managed as an outpatient in the infusion center PRN in effort to avoid ED visits as able.     RTC in 4 weeks    Esau Mccormick MD  Hematology/Medical Oncology  [facility]    MDM- high risk related to sickle cell disease management- given IVF and IV opiates requiring intensive monitoring in infusion unit.  ongoing continuity of complex care related to sickle cell disease.

## 2024-09-24 ENCOUNTER — TELEPHONE (OUTPATIENT)
Age: 79
End: 2024-09-24
Payer: MEDICARE

## 2024-09-26 ENCOUNTER — LAB (OUTPATIENT)
Dept: ONCOLOGY | Facility: HOSPITAL | Age: 79
End: 2024-09-26
Payer: MEDICARE

## 2024-09-26 ENCOUNTER — INFUSION (OUTPATIENT)
Dept: ONCOLOGY | Facility: HOSPITAL | Age: 79
End: 2024-09-26
Payer: MEDICARE

## 2024-09-26 VITALS
OXYGEN SATURATION: 91 % | HEART RATE: 77 BPM | DIASTOLIC BLOOD PRESSURE: 55 MMHG | RESPIRATION RATE: 18 BRPM | TEMPERATURE: 97.3 F | SYSTOLIC BLOOD PRESSURE: 137 MMHG

## 2024-09-26 DIAGNOSIS — C34.91 NON-SMALL CELL CANCER OF RIGHT LUNG: ICD-10-CM

## 2024-09-26 DIAGNOSIS — C34.11 PRIMARY CANCER OF RIGHT UPPER LOBE OF LUNG: Primary | ICD-10-CM

## 2024-09-26 DIAGNOSIS — C34.11 PRIMARY CANCER OF RIGHT UPPER LOBE OF LUNG: ICD-10-CM

## 2024-09-26 DIAGNOSIS — Z95.828 PORT-A-CATH IN PLACE: ICD-10-CM

## 2024-09-26 LAB
ALBUMIN SERPL-MCNC: 4.2 G/DL (ref 3.5–5.2)
ALBUMIN/GLOB SERPL: 1.3 G/DL
ALP SERPL-CCNC: 109 U/L (ref 39–117)
ALT SERPL W P-5'-P-CCNC: <5 U/L (ref 1–33)
ANION GAP SERPL CALCULATED.3IONS-SCNC: 8.3 MMOL/L (ref 5–15)
AST SERPL-CCNC: 18 U/L (ref 1–32)
BASOPHILS # BLD AUTO: 0.07 10*3/MM3 (ref 0–0.2)
BASOPHILS NFR BLD AUTO: 1 % (ref 0–1.5)
BILIRUB SERPL-MCNC: 0.5 MG/DL (ref 0–1.2)
BUN SERPL-MCNC: 17 MG/DL (ref 8–23)
BUN/CREAT SERPL: 15.9 (ref 7–25)
CALCIUM SPEC-SCNC: 9.2 MG/DL (ref 8.6–10.5)
CHLORIDE SERPL-SCNC: 103 MMOL/L (ref 98–107)
CO2 SERPL-SCNC: 28.7 MMOL/L (ref 22–29)
CREAT SERPL-MCNC: 1.07 MG/DL (ref 0.57–1)
DEPRECATED RDW RBC AUTO: 41.7 FL (ref 37–54)
EGFRCR SERPLBLD CKD-EPI 2021: 53.3 ML/MIN/1.73
EOSINOPHIL # BLD AUTO: 0.28 10*3/MM3 (ref 0–0.4)
EOSINOPHIL NFR BLD AUTO: 3.9 % (ref 0.3–6.2)
ERYTHROCYTE [DISTWIDTH] IN BLOOD BY AUTOMATED COUNT: 13.4 % (ref 12.3–15.4)
GLOBULIN UR ELPH-MCNC: 3.2 GM/DL
GLUCOSE SERPL-MCNC: 197 MG/DL (ref 65–99)
HCT VFR BLD AUTO: 39.6 % (ref 34–46.6)
HGB BLD-MCNC: 12.5 G/DL (ref 12–15.9)
IMM GRANULOCYTES # BLD AUTO: 0.03 10*3/MM3 (ref 0–0.05)
IMM GRANULOCYTES NFR BLD AUTO: 0.4 % (ref 0–0.5)
LYMPHOCYTES # BLD AUTO: 1.17 10*3/MM3 (ref 0.7–3.1)
LYMPHOCYTES NFR BLD AUTO: 16.3 % (ref 19.6–45.3)
MCH RBC QN AUTO: 27 PG (ref 26.6–33)
MCHC RBC AUTO-ENTMCNC: 31.6 G/DL (ref 31.5–35.7)
MCV RBC AUTO: 85.5 FL (ref 79–97)
MONOCYTES # BLD AUTO: 0.53 10*3/MM3 (ref 0.1–0.9)
MONOCYTES NFR BLD AUTO: 7.4 % (ref 5–12)
NEUTROPHILS NFR BLD AUTO: 5.11 10*3/MM3 (ref 1.7–7)
NEUTROPHILS NFR BLD AUTO: 71 % (ref 42.7–76)
NRBC BLD AUTO-RTO: 0 /100 WBC (ref 0–0.2)
PLATELET # BLD AUTO: 207 10*3/MM3 (ref 140–450)
PMV BLD AUTO: 10.4 FL (ref 6–12)
POTASSIUM SERPL-SCNC: 4.1 MMOL/L (ref 3.5–5.2)
PROT SERPL-MCNC: 7.4 G/DL (ref 6–8.5)
RBC # BLD AUTO: 4.63 10*6/MM3 (ref 3.77–5.28)
SODIUM SERPL-SCNC: 140 MMOL/L (ref 136–145)
WBC NRBC COR # BLD AUTO: 7.19 10*3/MM3 (ref 3.4–10.8)

## 2024-09-26 PROCEDURE — 85025 COMPLETE CBC W/AUTO DIFF WBC: CPT

## 2024-09-26 PROCEDURE — 96413 CHEMO IV INFUSION 1 HR: CPT

## 2024-09-26 PROCEDURE — 80053 COMPREHEN METABOLIC PANEL: CPT

## 2024-09-26 PROCEDURE — 25810000003 SODIUM CHLORIDE 0.9 % SOLUTION: Performed by: INTERNAL MEDICINE

## 2024-09-26 PROCEDURE — 25010000002 HEPARIN LOCK FLUSH PER 10 UNITS: Performed by: NURSE PRACTITIONER

## 2024-09-26 PROCEDURE — 25010000002 DURVALUMAB 500 MG/10ML SOLUTION 10 ML VIAL: Performed by: INTERNAL MEDICINE

## 2024-09-26 PROCEDURE — 25810000003 SODIUM CHLORIDE 0.9 % SOLUTION 250 ML FLEX CONT: Performed by: INTERNAL MEDICINE

## 2024-09-26 RX ORDER — HEPARIN SODIUM (PORCINE) LOCK FLUSH IV SOLN 100 UNIT/ML 100 UNIT/ML
300 SOLUTION INTRAVENOUS ONCE
OUTPATIENT
Start: 2024-09-26

## 2024-09-26 RX ORDER — SODIUM CHLORIDE 0.9 % (FLUSH) 0.9 %
20 SYRINGE (ML) INJECTION AS NEEDED
OUTPATIENT
Start: 2024-09-26

## 2024-09-26 RX ORDER — SODIUM CHLORIDE 0.9 % (FLUSH) 0.9 %
10 SYRINGE (ML) INJECTION AS NEEDED
OUTPATIENT
Start: 2024-09-26

## 2024-09-26 RX ORDER — SODIUM CHLORIDE 0.9 % (FLUSH) 0.9 %
10 SYRINGE (ML) INJECTION AS NEEDED
Status: DISCONTINUED | OUTPATIENT
Start: 2024-09-26 | End: 2024-09-26 | Stop reason: HOSPADM

## 2024-09-26 RX ORDER — HEPARIN SODIUM (PORCINE) LOCK FLUSH IV SOLN 100 UNIT/ML 100 UNIT/ML
500 SOLUTION INTRAVENOUS AS NEEDED
Status: DISCONTINUED | OUTPATIENT
Start: 2024-09-26 | End: 2024-09-26 | Stop reason: HOSPADM

## 2024-09-26 RX ORDER — HEPARIN SODIUM (PORCINE) LOCK FLUSH IV SOLN 100 UNIT/ML 100 UNIT/ML
500 SOLUTION INTRAVENOUS AS NEEDED
OUTPATIENT
Start: 2024-09-26

## 2024-09-26 RX ORDER — SODIUM CHLORIDE 9 MG/ML
250 INJECTION, SOLUTION INTRAVENOUS ONCE
Status: COMPLETED | OUTPATIENT
Start: 2024-09-26 | End: 2024-09-26

## 2024-09-26 RX ADMIN — HEPARIN 500 UNITS: 100 SYRINGE at 16:15

## 2024-09-26 RX ADMIN — SODIUM CHLORIDE 250 ML: 9 INJECTION, SOLUTION INTRAVENOUS at 15:12

## 2024-09-26 RX ADMIN — Medication 10 ML: at 16:15

## 2024-09-26 RX ADMIN — SODIUM CHLORIDE 1000 MG: 9 INJECTION, SOLUTION INTRAVENOUS at 15:12

## 2024-10-01 ENCOUNTER — SPECIALTY PHARMACY (OUTPATIENT)
Dept: PHARMACY | Facility: HOSPITAL | Age: 79
End: 2024-10-01
Payer: MEDICARE

## 2024-10-04 ENCOUNTER — DOCUMENTATION (OUTPATIENT)
Dept: ONCOLOGY | Facility: CLINIC | Age: 79
End: 2024-10-04
Payer: MEDICARE

## 2024-10-10 ENCOUNTER — LAB (OUTPATIENT)
Dept: ONCOLOGY | Facility: HOSPITAL | Age: 79
End: 2024-10-10
Payer: MEDICARE

## 2024-10-10 ENCOUNTER — OFFICE VISIT (OUTPATIENT)
Dept: ONCOLOGY | Facility: CLINIC | Age: 79
End: 2024-10-10
Payer: MEDICARE

## 2024-10-10 ENCOUNTER — INFUSION (OUTPATIENT)
Dept: ONCOLOGY | Facility: HOSPITAL | Age: 79
End: 2024-10-10
Payer: MEDICARE

## 2024-10-10 VITALS
RESPIRATION RATE: 18 BRPM | OXYGEN SATURATION: 90 % | SYSTOLIC BLOOD PRESSURE: 147 MMHG | HEART RATE: 76 BPM | TEMPERATURE: 97.3 F | DIASTOLIC BLOOD PRESSURE: 59 MMHG

## 2024-10-10 DIAGNOSIS — C34.91 NON-SMALL CELL CANCER OF RIGHT LUNG: Primary | ICD-10-CM

## 2024-10-10 DIAGNOSIS — C34.11 PRIMARY CANCER OF RIGHT UPPER LOBE OF LUNG: ICD-10-CM

## 2024-10-10 DIAGNOSIS — Z95.828 PORT-A-CATH IN PLACE: Primary | ICD-10-CM

## 2024-10-10 DIAGNOSIS — C34.91 NON-SMALL CELL CANCER OF RIGHT LUNG: ICD-10-CM

## 2024-10-10 DIAGNOSIS — Z29.89 IMMUNOTHERAPY ENCOUNTER: ICD-10-CM

## 2024-10-10 LAB
ALBUMIN SERPL-MCNC: 4 G/DL (ref 3.5–5.2)
ALBUMIN/GLOB SERPL: 1.3 G/DL
ALP SERPL-CCNC: 122 U/L (ref 39–117)
ALT SERPL W P-5'-P-CCNC: 10 U/L (ref 1–33)
ANION GAP SERPL CALCULATED.3IONS-SCNC: 9.7 MMOL/L (ref 5–15)
AST SERPL-CCNC: 12 U/L (ref 1–32)
BASOPHILS # BLD AUTO: 0.05 10*3/MM3 (ref 0–0.2)
BASOPHILS NFR BLD AUTO: 0.7 % (ref 0–1.5)
BILIRUB SERPL-MCNC: 0.3 MG/DL (ref 0–1.2)
BUN SERPL-MCNC: 18 MG/DL (ref 8–23)
BUN/CREAT SERPL: 18.4 (ref 7–25)
CALCIUM SPEC-SCNC: 9.1 MG/DL (ref 8.6–10.5)
CHLORIDE SERPL-SCNC: 102 MMOL/L (ref 98–107)
CO2 SERPL-SCNC: 27.3 MMOL/L (ref 22–29)
CREAT SERPL-MCNC: 0.98 MG/DL (ref 0.57–1)
DEPRECATED RDW RBC AUTO: 39.5 FL (ref 37–54)
EGFRCR SERPLBLD CKD-EPI 2021: 59.2 ML/MIN/1.73
EOSINOPHIL # BLD AUTO: 0.27 10*3/MM3 (ref 0–0.4)
EOSINOPHIL NFR BLD AUTO: 4 % (ref 0.3–6.2)
ERYTHROCYTE [DISTWIDTH] IN BLOOD BY AUTOMATED COUNT: 12.9 % (ref 12.3–15.4)
GLOBULIN UR ELPH-MCNC: 3.2 GM/DL
GLUCOSE SERPL-MCNC: 289 MG/DL (ref 65–99)
HCT VFR BLD AUTO: 38.3 % (ref 34–46.6)
HGB BLD-MCNC: 12 G/DL (ref 12–15.9)
IMM GRANULOCYTES # BLD AUTO: 0.04 10*3/MM3 (ref 0–0.05)
IMM GRANULOCYTES NFR BLD AUTO: 0.6 % (ref 0–0.5)
LYMPHOCYTES # BLD AUTO: 1.14 10*3/MM3 (ref 0.7–3.1)
LYMPHOCYTES NFR BLD AUTO: 17 % (ref 19.6–45.3)
MCH RBC QN AUTO: 26.6 PG (ref 26.6–33)
MCHC RBC AUTO-ENTMCNC: 31.3 G/DL (ref 31.5–35.7)
MCV RBC AUTO: 84.9 FL (ref 79–97)
MONOCYTES # BLD AUTO: 0.49 10*3/MM3 (ref 0.1–0.9)
MONOCYTES NFR BLD AUTO: 7.3 % (ref 5–12)
NEUTROPHILS NFR BLD AUTO: 4.73 10*3/MM3 (ref 1.7–7)
NEUTROPHILS NFR BLD AUTO: 70.4 % (ref 42.7–76)
NRBC BLD AUTO-RTO: 0 /100 WBC (ref 0–0.2)
PLATELET # BLD AUTO: 214 10*3/MM3 (ref 140–450)
PMV BLD AUTO: 10.6 FL (ref 6–12)
POTASSIUM SERPL-SCNC: 4 MMOL/L (ref 3.5–5.2)
PROT SERPL-MCNC: 7.2 G/DL (ref 6–8.5)
RBC # BLD AUTO: 4.51 10*6/MM3 (ref 3.77–5.28)
SODIUM SERPL-SCNC: 139 MMOL/L (ref 136–145)
T4 FREE SERPL-MCNC: 0.9 NG/DL (ref 0.92–1.68)
TSH SERPL DL<=0.05 MIU/L-ACNC: 2.9 UIU/ML (ref 0.27–4.2)
WBC NRBC COR # BLD AUTO: 6.72 10*3/MM3 (ref 3.4–10.8)

## 2024-10-10 PROCEDURE — 25010000002 HEPARIN LOCK FLUSH PER 10 UNITS: Performed by: NURSE PRACTITIONER

## 2024-10-10 PROCEDURE — 80053 COMPREHEN METABOLIC PANEL: CPT

## 2024-10-10 PROCEDURE — 96413 CHEMO IV INFUSION 1 HR: CPT

## 2024-10-10 PROCEDURE — 84439 ASSAY OF FREE THYROXINE: CPT

## 2024-10-10 PROCEDURE — 25810000003 SODIUM CHLORIDE 0.9 % SOLUTION 250 ML FLEX CONT: Performed by: INTERNAL MEDICINE

## 2024-10-10 PROCEDURE — 85025 COMPLETE CBC W/AUTO DIFF WBC: CPT

## 2024-10-10 PROCEDURE — 25010000002 DURVALUMAB 500 MG/10ML SOLUTION 10 ML VIAL: Performed by: INTERNAL MEDICINE

## 2024-10-10 PROCEDURE — 84443 ASSAY THYROID STIM HORMONE: CPT

## 2024-10-10 RX ORDER — SODIUM CHLORIDE 0.9 % (FLUSH) 0.9 %
20 SYRINGE (ML) INJECTION AS NEEDED
OUTPATIENT
Start: 2024-10-10

## 2024-10-10 RX ORDER — SODIUM CHLORIDE 0.9 % (FLUSH) 0.9 %
10 SYRINGE (ML) INJECTION AS NEEDED
Status: DISCONTINUED | OUTPATIENT
Start: 2024-10-10 | End: 2024-10-10 | Stop reason: HOSPADM

## 2024-10-10 RX ORDER — HEPARIN SODIUM (PORCINE) LOCK FLUSH IV SOLN 100 UNIT/ML 100 UNIT/ML
500 SOLUTION INTRAVENOUS AS NEEDED
OUTPATIENT
Start: 2024-10-10

## 2024-10-10 RX ORDER — HEPARIN SODIUM (PORCINE) LOCK FLUSH IV SOLN 100 UNIT/ML 100 UNIT/ML
300 SOLUTION INTRAVENOUS ONCE
OUTPATIENT
Start: 2024-10-10

## 2024-10-10 RX ORDER — SODIUM CHLORIDE 0.9 % (FLUSH) 0.9 %
10 SYRINGE (ML) INJECTION AS NEEDED
OUTPATIENT
Start: 2024-10-10

## 2024-10-10 RX ORDER — HEPARIN SODIUM (PORCINE) LOCK FLUSH IV SOLN 100 UNIT/ML 100 UNIT/ML
500 SOLUTION INTRAVENOUS AS NEEDED
Status: DISCONTINUED | OUTPATIENT
Start: 2024-10-10 | End: 2024-10-10 | Stop reason: HOSPADM

## 2024-10-10 RX ADMIN — Medication 10 ML: at 16:35

## 2024-10-10 RX ADMIN — SODIUM CHLORIDE 1000 MG: 9 INJECTION, SOLUTION INTRAVENOUS at 15:24

## 2024-10-10 RX ADMIN — HEPARIN 500 UNITS: 100 SYRINGE at 16:35

## 2024-10-10 NOTE — PROGRESS NOTES
Subjective     Date: 10/10/2024    Referring Provider  No ref. provider found    Chief Complaint  Symptomatic anemia   2. Non small Cell Lung Cancer  Cancer Staging   Stage IIIA (cT1c, cN2, cM0)        Subjective      Mayra Hays is a 79 y.o. female who presents today to [facility] for follow up.    HPI:   79 y.o. female with past medical history of diabetes mellitus type 2, hyperlipidemia, COPD, hypertension, atrial fibrillation on anticoagulation (Eliquis), h/o CVA and previous tobacco use presents for symptomatic anemia and NSCLC.    She is present today with her daughter, Hailey, who is assisting with history. Pt started work up for RUL lung nodule , since then noticed to have a decrease in her Hgb to 10.7 from normal in 2023. More recently, her Hgb 9 was 6.5, she was directed to ED where she received 1U PRBC.     Patient reports increased fatigue over the last several months. She reports no bleeding, however does report dark colored stool (melena) two days ago. Denies any other evidence of melena or hematochezia.     She has previously been diagnosed with iron deficiency (years ago) requiring oral iron supplements, but never received IV iron or bone marrow biopsy.    She and her daughter are unsure of her last colonoscopy and endoscopy history, think it may have occurred >5 years ago but unsure.    She denies recent weight loss, fever, chills,  night sweats.  Previous smoker, quit 5-6 years ago, smoked 3 packs/day X 30 years. Denies alcohol or drug use. Family history significant for son with leukemia, brothers with lung cancer.     Oncology History:  2023: low dose CT chest screenin.9 cm spiculated right upper lobe pulmonary nodule concerning for malignancy, PET/CT recommended.   2023: EBUS by Dr. Peter negative for malignancy for bronchial washing and FNA of station 10R  2023: PET/CT: Right upper lobe pulmonary nodule, more  posterior possible satellite nodules are postobstructive process measuring 2.1 cm with mSUV 14.5. Also with pretracheal region lymph node 2.6 cm.   6/29/2023: CT guided needle biopsy was non diagnostic, showed fibrosis and chronic inflammation.   8/9/2023: Lab work showed anemia with Hgb 6.5. Referred to ED. Bronchoscopy deferred.   9/6/2023: Navigational bronchoscopy performed by Dr. Clemens- Right upper lobe lung transbronchial biopsy revealed squamous cell carcinoma, lymph node at station 4R also involved with squamous cell carcinoma. PD-L1 TPS 60%. Patient not deemed a surgical candidate.  9/13-11/21/2023: Received weekly carboplatin  X 7 and radiation. Patient had a reaction to paclitaxel, it was omitted.   12/19/2023: Post treatment CT chest with improvement of masslike process RUL now 1.6 cm, suggest significant response to treatment. Mediastinal lymph nodes are now within normal limits of size.     Ms. Hays presents today with her daughter, grand daughter, and great grand daughters. She is in a wheelchair. She lives alone with her grand son, able to perform her ADLs including cooking, cleaning.     Treatment history:        2.       Interval History 10/11/2023   Ms. Hays presents for follow up today, accompanied by her daughter in law. She started treatment with paclitaxel and carboplatin on 10/9, unfortunately had a anaphylactic reaction to taxol after 8 minutes of infusion causing her to go to the ED. Patient was awake and feeling back to herself at the time of ED visit. Carboplatin was not administered.     She reports doing well overall, no new symptoms. Started radiation therapy yesterday.      Interval History 10/24/2023   Ms. Hays presents for cycle 2 of carboplatin with concurrent radiation. Did well with first cycle. Denies any adverse effects including nausea, vomiting, diarrhea, neuropathy. Appetite is good. Radiation is going well. No complaints today.  Denies any skin rash or  bites.    Interval History 11/21/2023   Ms. Hays presents today for cycle 7 of carboplatin with concurrent radiation. She reports good tolerance thus far without neuropathy, nausea, vomiting, diarrhea. Appetite is stable.   Denies any GIB.    Interval History 01/11/2024   Ms. Hays presents after completion of therapy. She was admitted to the hospital 12/31 due to shortness of breath, found to have Afib with RVR and coronavirus HKU. She received inpatient antibiotics and steroids. Currently with improvement, still reports some fatigue.   We reviewed her last CT chest, which shows improvement after treatment. Discussed continuing immunotherapy.      Interval History 02/22/2024   Ms. Hays presents today for follow, prior to cycle 3 of maintenance durvalumab. Accompanied by her grand daughter's fiance. She reports improved energy. Denies any bleeding, nausea, vomiting, diarrhea, rash. On examination, noted to have petechia on bilateral distal lower extremities and R distal upper extremities, she believes these started today. Denies pruritus.     Interval History 03/21/2024    presents prior to C5 of consolidation therapy with durvalumab. She reports good tolerance to the last treatment, denies shortness of breath, diarrhea, nausea/vomiting, fatigue. She's been applying lotion to her legs, petechia has decreased, primarily just on her left leg now.     Interval History 04/18/2024   Ms. Hays presents prior to C7 of durvalumab. Reports good tolerance without nausea, vomiting, diarrhea. No changes in petechia. Denies any other complaints.    Interval History 06/13/2024   Ms. Hays presents for follow up prior to cycle 11 of durvalumab. She presented to ED 6/6 due to shortness of breath and cough. CXR showed RUL and left basilar airspace disease for which she received levofloxacin. Today with improvement in symptoms. Denies diarrhea, nausea, vomiting.     Interval History 07/18/2024   Ms. Hays presents for  follow-up, prior to cycle 13 of consolidation Durvalumab. She reports good tolerance, denies any cough/shortness of breath, NVD. Continues to have non pruritic petechia on her legs.     Interval History 08/15/2024   Ms. Hays presents for follow up, prior to C 15 of Durvalumab. Surveillance CT chest 8/1 shows airspace disease with volume loss right upper lobe similar to prior exam, felt to reflect post treatment related change.   Denies any new symptoms, continues to do well with tx.    Interval History 08/29/2024  Ms. Hays presents today for follow up, prior to cycle 16 Durvalumab. Overall, she reports that she has been doing well since her last visit. She continues to tolerate the treatments well and without any noticeable side effects. She reports a good appetite, hydrating well. Denies any nausea/vomiting or diarrhea. No rash. She is without any specific complaints at this time.     Interval History 09/12/2024   Ms. Hays presents for follow up, prior to C17 of consolidation therapy with durvalumab. She reports doing well without nausea, vomiting, diarrhea. No changes in breathing or cough. Continues to use intermittent oxygen. No new symptoms.     Interval History 10/10/2024   Ms. Hays presents for follow up, prior to cycle 19. Doing well overall with no AE. Rash is significantly improved. Has been more mobile. No other complaints.      Objective     Objective     Allergy:   Allergies   Allergen Reactions    Paclitaxel Anaphylaxis        Current Medications:   Current Outpatient Medications   Medication Sig Dispense Refill    albuterol sulfate  (90 Base) MCG/ACT inhaler Inhale 2 puffs by mouth Every 4 (Four) Hours As Needed for Wheezing. 18 g 6    apixaban (ELIQUIS) 5 MG tablet tablet Take 1 tablet by mouth Every 12 (Twelve) Hours. 180 tablet 3    atorvastatin (LIPITOR) 20 MG tablet Take 1 tablet by mouth Daily.      Budeson-Glycopyrrol-Formoterol (Breztri Aerosphere) 160-9-4.8 MCG/ACT aerosol inhaler  Inhale 2 puffs by mouth 2 (Two) Times a Day. 10.7 g 6    esomeprazole (nexIUM) 20 MG capsule Take 1 capsule by mouth Daily.      hydrOXYzine pamoate (VISTARIL) 25 MG capsule Take 1 capsule by mouth Every Night.      ipratropium-albuterol (DUO-NEB) 0.5-2.5 mg/3 ml nebulizer Take 3 mL by nebulization Every 4 (Four) Hours As Needed for Wheezing or Shortness of Air. 320 mL 3    lidocaine-prilocaine (EMLA) 2.5-2.5 % cream Apply to port-a-cath site 30 minutes prior to arrival at infusion center. Cover with plastic wrap. (Patient not taking: Reported on 9/3/2024) 30 g 1    lisinopril (PRINIVIL,ZESTRIL) 5 MG tablet Take 1 tablet by mouth Daily. 90 tablet 3    metFORMIN (GLUCOPHAGE) 500 MG tablet Take 1 tablet by mouth 2 (Two) Times a Day As Needed (only takes if blood glucose is above 200). As needed      prochlorperazine (COMPAZINE) 10 MG tablet Take 1 tablet by mouth Every 6 (Six) Hours As Needed for Nausea or Vomiting. 30 tablet 1    spironolactone (ALDACTONE) 25 MG tablet Take 1 tablet by mouth Daily.       No current facility-administered medications for this visit.     Facility-Administered Medications Ordered in Other Visits   Medication Dose Route Frequency Provider Last Rate Last Admin    heparin injection 500 Units  500 Units Intravenous PRN Bailee Gan APRN   500 Units at 01/26/24 1558    heparin injection 500 Units  500 Units Intravenous PRN Bailee Gan APRN        sodium chloride 0.9 % flush 10 mL  10 mL Intravenous PRN Bailee Gan APRN        sodium chloride 0.9 % flush 10 mL  10 mL Intravenous PRN Bailee Gan APRN           Past Medical History:  Past Medical History:   Diagnosis Date    Arthritis     CHF (congestive heart failure)     Chronic anticoagulation     Eliquis    Chronic kidney disease     stage II/IIIa    Collapsed lung     COPD (chronic obstructive pulmonary disease)     Diabetes mellitus     TYPE 2    Elevated cholesterol     GERD (gastroesophageal reflux  disease)     Hyperlipidemia     Hypertension     Non-small cell lung cancer RUL     Sleep apnea     non compliant with CPAP    Stroke 2019    Wears eyeglasses        Past Surgical History:  Past Surgical History:   Procedure Laterality Date    BRONCHOSCOPY Bilateral 2023    Procedure: BRONCHOSCOPY WITH ENDOBRONCHIAL ULTRASOUND;  Surgeon: Abdulkadir Peter MD;  Location:  [facility];  Service: Pulmonary;  Laterality: Bilateral;    BRONCHOSCOPY WITH ION ROBOTIC ASSIST N/A 2023    Procedure: BRONCHOSCOPY WITH ION ROBOT AND EBUS;  Surgeon: John Clemens MD;  Location:  [facility];  Service: Robotics - Pulmonary;  Laterality: N/A;  Ion cath #6 - 0032,  #6 - 0030, cath guide # 0077. EBUS scope removed with balloon intact.    CARDIAC CATHETERIZATION N/A 2017    Procedure: Left Heart Cath;  Surgeon: Jatinder Matias MD;  Location:  [facility]ATH INVASIVE [facility];  Service:     CARDIAC CATHETERIZATION N/A 2021    Procedure: Left Heart Cath;  Surgeon: Tolu Steinberg MD;  Location: [facility] CATH INVAS[facility];  Service: Cardiology;  Laterality: N/A;    COLONOSCOPY      ENDOSCOPY      GALLBLADDER SURGERY      PORTACATH PLACEMENT N/A 2023    Procedure: INSERTION OF PORTACATH;  Surgeon: Petr Velásquez MD;  Location:  [facility];  Service: General;  Laterality: N/A;    VENTRAL HERNIA REPAIR N/A 2020    Procedure: VENTRAL HERNIA REPAIR LAPAROSCOPIC WITH DAVINCI ROBOT;  Surgeon: Petr Velásquez MD;  Location:  [facility];  Service: DaVinci;  Laterality: N/A;       Social History:  Social History     Socioeconomic History    Marital status: Single    Number of children: 6   Tobacco Use    Smoking status: Former     Current packs/day: 0.00     Average packs/day: 3.0 packs/day for 16.7 years (50.2 ttl pk-yrs)     Types: Cigarettes     Start date: 4/3/1998     Quit date: 2015     Years since quittin.7     Passive exposure: Past    Smokeless tobacco: Never   Vaping Use     Vaping status: Never Used   Substance and Sexual Activity    Alcohol use: No    Drug use: No    Sexual activity: Never         Family History:  Family History   Problem Relation Age of Onset    Heart disease Mother         Rhianna sophia    Heart disease Father     Heart attack Father     Heart failure Father        Review of Systems:  Review of Systems   Constitutional:  Positive for fatigue.   All other systems reviewed and are negative.      Vital Signs:   There were no vitals taken for this visit.     Physical Exam:  Physical Exam  Vitals reviewed.   Constitutional:       General: She is not in acute distress.     Appearance: Normal appearance. She is obese. She is not ill-appearing.      Comments: +hard of hearing   HENT:      Head: Normocephalic and atraumatic.      Mouth/Throat:      Mouth: Mucous membranes are moist.      Pharynx: Oropharynx is clear.   Eyes:      Conjunctiva/sclera: Conjunctivae normal.      Pupils: Pupils are equal, round, and reactive to light.   Cardiovascular:      Rate and Rhythm: Normal rate and regular rhythm.      Heart sounds: No murmur heard.  Pulmonary:      Effort: Pulmonary effort is normal. No respiratory distress.      Breath sounds: Wheezing present.   Chest:      Comments: +R chest port  Abdominal:      General: Abdomen is flat. Bowel sounds are normal. There is no distension.      Palpations: Abdomen is soft. There is no mass.      Tenderness: There is no abdominal tenderness. There is no guarding.   Musculoskeletal:         General: No swelling. Normal range of motion.      Cervical back: Normal range of motion.   Lymphadenopathy:      Cervical: No cervical adenopathy.   Skin:     General: Skin is warm and dry.      Findings: Petechiae present.      Comments: LLE    Neurological:      General: No focal deficit present.      Mental Status: She is alert and oriented to person, place, and time. Mental status is at baseline.   Psychiatric:         Mood and Affect: Mood normal.          PHQ-9 Score  PHQ-9 Total Score:       Pain Score  There were no vitals filed for this visit.                                  PAINSCOREQUALITYMETRIC:   There were no vitals filed for this visit.                         Lab Review  Lab Results   Component Value Date    WBC 6.72 10/10/2024    HGB 12.0 10/10/2024    HCT 38.3 10/10/2024    MCV 84.9 10/10/2024    RDW 12.9 10/10/2024     10/10/2024    NEUTRORELPCT 70.4 10/10/2024    LYMPHORELPCT 17.0 (L) 10/10/2024    MONORELPCT 7.3 10/10/2024    EOSRELPCT 4.0 10/10/2024    BASORELPCT 0.7 10/10/2024    NEUTROABS 4.73 10/10/2024    LYMPHSABS 1.14 10/10/2024     Lab Results   Component Value Date     09/26/2024    K 4.1 09/26/2024    CO2 28.7 09/26/2024     09/26/2024    BUN 17 09/26/2024    CREATININE 1.07 (H) 09/26/2024    EGFRIFNONA 51 (L) 11/22/2021    GLUCOSE 197 (H) 09/26/2024    CALCIUM 9.2 09/26/2024    ALKPHOS 109 09/26/2024    AST 18 09/26/2024    ALT <5 09/26/2024    BILITOT 0.5 09/26/2024    ALBUMIN 4.2 09/26/2024    PROTEINTOT 7.4 09/26/2024    MG 2.2 01/03/2024    PHOS 3.5 05/18/2020     Lab Results   Component Value Date    RETICCTPCT 4.26 (H) 08/16/2023     Lab Results   Component Value Date    FERRITIN 63.20 09/12/2024    IRON 62 09/12/2024    TIBC 386 09/12/2024    LABIRON 16 (L) 09/12/2024    [id number] 548 08/14/2023    FOLATE 12.90 08/14/2023        Radiology Results    CT Chest With Contrast Diagnostic (08/01/2024 14:14)     FINDINGS:  Soft tissue windows demonstrate no adenopathy within the chest. The  thyroid gland is unremarkable. The heart is normal in size. The aorta is  normal in caliber. There are no pleural or pericardial effusions. Lung  windows redemonstrate airspace disease in the right upper lobe with  volume loss felt to reflect posttreatment related change. Scarring is  present in the anterior left upper lobe and right middle lobe. There is  airspace disease in both lung bases similar in appearance to the  prior  exam.. The visualized upper abdomen is unremarkable. Bone windows reveal  no lytic or destructive lesions.     IMPRESSION:  Airspace disease with volume loss in the right upper lobe  similar in appearance to the prior exam felt to reflect posttreatment  related change. Continued follow-up is recommended.      CT Chest With Contrast Diagnostic (05/01/2024 09:01)     IMPRESSION:  1. Development of airspace disease in the right upper lobe which is in  the region of a previously noted spiculated nodule likely reflecting  posttreatment related change.  2. Interval improvement in the previously noted bilateral lower lobe  airspace disease with a few patchy opacities remaining.    CT Chest Without Contrast Diagnostic (12/31/2023 20:30)   IMPRESSION:  Spiculated nodule of the right upper lobe concerning for neoplasm.  Masslike consolidation in the right lower lobe could represent pneumonia  with underlying mass not excluded. Atelectasis/airspace disease in the  left lower lobe and lingula. Mediastinal adenopathy. Follow-up  recommended.    CT Chest With Contrast Diagnostic (12/19/2023 14:47)     IMPRESSION:  1.  Significant decrease size and masslike appearance of process in the  right upper lobe which would suggest significant response to treatment.  2.  No suspicious pulmonary nodules or masses identified.  3.  Mediastinal lymph nodes are now within normal limits for size.  No  hilar or mediastinal adenopathy.  4.  Mild centrilobular nodularity of the left lower lobe which may  reflect changes of atypical pneumonia or aspiration pneumonitis.  5.  Stable cardiomegaly.  6.  Other incidental/nonacute findings above.      CT Angiogram Chest Pulmonary Embolism (10/09/2023 16:41)   MPRESSION:  1.  Right upper lobe mass in association with pneumonia has increased in  size and extent when compared to the previous exam with extension to the  pleural surface. Masslike component is approximately 56.8 x 44.5 mm and  was  previously 40.1 x 36.7 mm. This would correspond to primary  malignancy.  2.  Central bronchial wall thickening. Can be seen with reactive airway  disease or bronchitis.  3.  1.3 cm left adrenal nodule is noted. No significant change from  previous.  4.  Increased size of paratracheal and mediastinal lymph nodes. A right  paratracheal lymph node is 2.5 cm and was previously 2.2 cm.  5. No PE identified.  6. Marked cardiomegaly, stable.  7. Otherwise stable chest with other nonacute/incidental findings as  above.    CT Head Without Contrast (10/09/2023 12:00)   IMPRESSION:    Unremarkable exam demonstrating no CT evidence of acute intracranial  findings.      CT Chest Without Contrast Diagnostic (08/09/2023 12:38)       NM PET/CT Skull Base to Mid Thigh (06/20/2023 11:12)         CT Chest Low Dose Cancer Screening WO (02/14/2023 12:58)   IMPRESSION:    Interval development or enlargement of a 1.9 cm spiculated right upper  lobe pulmonary nodule concerning in appearance for malignancy and PET/CT  is recommended for further evaluation.        Pathology:     Tissue Pathology Exam (09/06/2023 08:13)         6/29/23           Assessment / Plan         Assessment and Plan   Mayra Hays is a 79 y.o. year old presents for       Non small cell lung cancer, squamous cell    Cancer Staging   Stage IIIA (cT1c, cN2, cM0)  -Oncology history as above. Patient with 1.9 cm spiculated right upper lobe nodule on low dose CT chest screen (2/2023). EBUS by Dr. Peter negative for malignancy (2/2023). PET/CT with hypermetabolism RUL 2.1 cm and pretracheal region lymph node (6/2023). CT guided biopsy negative for malignancy (6/2023). Navigational bronchoscopy with positive RUL and  4R (paratracheal) for squamous cell (9/6/2023). PD-L1 TPS score 60%.   -Completed weekly carboplatin AUC2. Patient experienced anaphylaxis 8 minutes into the paclitaxel on first cycle 10/9. Due to this, paclitaxel will be omitted from weekly  chemotherapy.  -Radiation therapy 10/10; 6000 cGy in 30 treatment fractions. Complete 11/21/2023  -Post treatment CT 12/19/23 with good/partial response  -Cont consolidation therapy with durvalumab every 2 weeks; C19/26 today. Repeat CT chest 11/2024 ordered     2. Anemia; iron deficiency anemia due to GIB  - Patient with normal H/H in our system 2/2023, with acute drop in Hgb 6/29 (10.7) to Hgb (6.5) on 8/9 requiring transfusion. Patient reports one episode of melena two days ago (she is a poor historian).   -Last colonoscopy and endoscopy are unknown; think it may have been >5 years ago  -CBC from 8/14 show Hgb 8.2, Hct 27, MCV 90. Normal WBC and platelet count. Iron studies with normal iron (37), TIBC (435), and low iron saturation (9). This is in light of recent blood transfusion. Normal folate and B12 level. Reticulocyte count noted to be elevated to 6%  -Patient is with fatigue. Denies shortness of breath (aside from baseline COPD) or chest pain. Continues to be on Eliquis for Afib  -Initial work up from consultation confirmed iron deficiency anemia, normal folate/b12 levels. No indication of hemolysis seen with normal LDH and haptoglobin. Myeloma work up has been negative with no M spike on serum protein electrophoresis and normal k/l free light chain ratio. Peripheral smear with normal total WBC without granulocytic dysplasia or blasts.  -Received Ferumoxytol 8/29/2023 and 11/27/23  -Pt to see surgery post treatment for repeat EGD and Colonoscopy     3. Malignancy associated pain  - Currently denies    4. Surveillance (per [facility]CCN)  -Surveillance would include CT with and without contrast every 3 to 6 months for 3 years, then every 6 months for 2 years, then low-dose noncontrast CT annually.   Next CT 11/2024    5. Petechia of lower extremities -Improved  - Normal Plt counts  - Refer to dermatology     Discussed possible differential diagnoses, testing, treatment, recommended non-pharmacological interventions,  risks, warning signs to monitor for that would indicate need for follow-up in clinic or ER. If no improvement with these regimens or you have new or worsening symptoms follow-up. Patient verbalizes understanding and agreement with plan of care. Denies further needs or concerns.     Patient was given instructions and counseling regarding her condition and for health maintenance advised.       All questions were answered to her satisfaction.              Meds ordered during this visit  No orders of the defined types were placed in this encounter.      Visit Diagnoses    ICD-10-CM ICD-9-CM   1. Non-small cell cancer of right lung  C34.91 162.9   2. Immunotherapy encounter  Z29.89 V07.2                               Follow Up   In 1 month with tx with CBC, iron studies, ferritin, CMP    FU CT chest        This document has been electronically signed by Shawna Bond MD   October 10, 2024 14:23 EDT    Dictated Utilizing Dragon Dictation: Part of this note may be an electronic transcription/translation of spoken language to printed text using the Dragon Dictation System.

## 2024-10-15 ENCOUNTER — DOCUMENTATION (OUTPATIENT)
Dept: ONCOLOGY | Facility: CLINIC | Age: 79
End: 2024-10-15
Payer: MEDICARE

## 2024-10-22 DIAGNOSIS — C34.91 NON-SMALL CELL CANCER OF RIGHT LUNG: Primary | ICD-10-CM

## 2024-10-22 DIAGNOSIS — C34.11 PRIMARY CANCER OF RIGHT UPPER LOBE OF LUNG: ICD-10-CM

## 2024-10-22 RX ORDER — SODIUM CHLORIDE 9 MG/ML
250 INJECTION, SOLUTION INTRAVENOUS ONCE
OUTPATIENT
Start: 2024-11-07

## 2024-10-22 RX ORDER — SODIUM CHLORIDE 9 MG/ML
250 INJECTION, SOLUTION INTRAVENOUS ONCE
Status: CANCELLED | OUTPATIENT
Start: 2024-10-24

## 2024-10-24 ENCOUNTER — LAB (OUTPATIENT)
Dept: ONCOLOGY | Facility: HOSPITAL | Age: 79
End: 2024-10-24
Payer: MEDICARE

## 2024-10-24 ENCOUNTER — INFUSION (OUTPATIENT)
Dept: ONCOLOGY | Facility: HOSPITAL | Age: 79
End: 2024-10-24
Payer: MEDICARE

## 2024-10-24 VITALS
SYSTOLIC BLOOD PRESSURE: 142 MMHG | DIASTOLIC BLOOD PRESSURE: 58 MMHG | WEIGHT: 209 LBS | BODY MASS INDEX: 30.86 KG/M2 | RESPIRATION RATE: 18 BRPM | HEART RATE: 68 BPM | OXYGEN SATURATION: 90 % | TEMPERATURE: 97.3 F

## 2024-10-24 DIAGNOSIS — Z95.828 PORT-A-CATH IN PLACE: ICD-10-CM

## 2024-10-24 DIAGNOSIS — C34.11 PRIMARY CANCER OF RIGHT UPPER LOBE OF LUNG: ICD-10-CM

## 2024-10-24 DIAGNOSIS — C34.91 NON-SMALL CELL CANCER OF RIGHT LUNG: ICD-10-CM

## 2024-10-24 DIAGNOSIS — D50.9 IRON DEFICIENCY ANEMIA, UNSPECIFIED IRON DEFICIENCY ANEMIA TYPE: ICD-10-CM

## 2024-10-24 DIAGNOSIS — K90.49 MALABSORPTION DUE TO INTOLERANCE, NOT ELSEWHERE CLASSIFIED: ICD-10-CM

## 2024-10-24 DIAGNOSIS — C34.11 PRIMARY CANCER OF RIGHT UPPER LOBE OF LUNG: Primary | ICD-10-CM

## 2024-10-24 LAB
ALBUMIN SERPL-MCNC: 4.1 G/DL (ref 3.5–5.2)
ALBUMIN/GLOB SERPL: 1.2 G/DL
ALP SERPL-CCNC: 144 U/L (ref 39–117)
ALT SERPL W P-5'-P-CCNC: 9 U/L (ref 1–33)
ANION GAP SERPL CALCULATED.3IONS-SCNC: 6.1 MMOL/L (ref 5–15)
AST SERPL-CCNC: 14 U/L (ref 1–32)
BASOPHILS # BLD AUTO: 0.04 10*3/MM3 (ref 0–0.2)
BASOPHILS NFR BLD AUTO: 0.6 % (ref 0–1.5)
BILIRUB SERPL-MCNC: 0.2 MG/DL (ref 0–1.2)
BUN SERPL-MCNC: 19 MG/DL (ref 8–23)
BUN/CREAT SERPL: 21.8 (ref 7–25)
CALCIUM SPEC-SCNC: 9 MG/DL (ref 8.6–10.5)
CHLORIDE SERPL-SCNC: 106 MMOL/L (ref 98–107)
CO2 SERPL-SCNC: 27.9 MMOL/L (ref 22–29)
CREAT SERPL-MCNC: 0.87 MG/DL (ref 0.57–1)
DEPRECATED RDW RBC AUTO: 39.9 FL (ref 37–54)
EGFRCR SERPLBLD CKD-EPI 2021: 68.3 ML/MIN/1.73
EOSINOPHIL # BLD AUTO: 0.25 10*3/MM3 (ref 0–0.4)
EOSINOPHIL NFR BLD AUTO: 3.8 % (ref 0.3–6.2)
ERYTHROCYTE [DISTWIDTH] IN BLOOD BY AUTOMATED COUNT: 12.9 % (ref 12.3–15.4)
FERRITIN SERPL-MCNC: 46.2 NG/ML (ref 13–150)
GLOBULIN UR ELPH-MCNC: 3.3 GM/DL
GLUCOSE SERPL-MCNC: 179 MG/DL (ref 65–99)
HCT VFR BLD AUTO: 38.4 % (ref 34–46.6)
HGB BLD-MCNC: 11.8 G/DL (ref 12–15.9)
IMM GRANULOCYTES # BLD AUTO: 0.05 10*3/MM3 (ref 0–0.05)
IMM GRANULOCYTES NFR BLD AUTO: 0.8 % (ref 0–0.5)
IRON 24H UR-MRATE: 98 MCG/DL (ref 37–145)
IRON SATN MFR SERPL: 25 % (ref 20–50)
LYMPHOCYTES # BLD AUTO: 1.24 10*3/MM3 (ref 0.7–3.1)
LYMPHOCYTES NFR BLD AUTO: 18.6 % (ref 19.6–45.3)
MCH RBC QN AUTO: 26.4 PG (ref 26.6–33)
MCHC RBC AUTO-ENTMCNC: 30.7 G/DL (ref 31.5–35.7)
MCV RBC AUTO: 85.9 FL (ref 79–97)
MONOCYTES # BLD AUTO: 0.67 10*3/MM3 (ref 0.1–0.9)
MONOCYTES NFR BLD AUTO: 10.1 % (ref 5–12)
NEUTROPHILS NFR BLD AUTO: 4.41 10*3/MM3 (ref 1.7–7)
NEUTROPHILS NFR BLD AUTO: 66.1 % (ref 42.7–76)
NRBC BLD AUTO-RTO: 0 /100 WBC (ref 0–0.2)
PLATELET # BLD AUTO: 208 10*3/MM3 (ref 140–450)
PMV BLD AUTO: 10.5 FL (ref 6–12)
POTASSIUM SERPL-SCNC: 3.9 MMOL/L (ref 3.5–5.2)
PROT SERPL-MCNC: 7.4 G/DL (ref 6–8.5)
RBC # BLD AUTO: 4.47 10*6/MM3 (ref 3.77–5.28)
SODIUM SERPL-SCNC: 140 MMOL/L (ref 136–145)
TIBC SERPL-MCNC: 398 MCG/DL (ref 298–536)
TRANSFERRIN SERPL-MCNC: 267 MG/DL (ref 200–360)
WBC NRBC COR # BLD AUTO: 6.66 10*3/MM3 (ref 3.4–10.8)

## 2024-10-24 PROCEDURE — 80053 COMPREHEN METABOLIC PANEL: CPT

## 2024-10-24 PROCEDURE — 84466 ASSAY OF TRANSFERRIN: CPT

## 2024-10-24 PROCEDURE — 25010000002 DURVALUMAB 500 MG/10ML SOLUTION 10 ML VIAL: Performed by: INTERNAL MEDICINE

## 2024-10-24 PROCEDURE — 25810000003 SODIUM CHLORIDE 0.9 % SOLUTION 250 ML FLEX CONT: Performed by: INTERNAL MEDICINE

## 2024-10-24 PROCEDURE — 85025 COMPLETE CBC W/AUTO DIFF WBC: CPT

## 2024-10-24 PROCEDURE — 82728 ASSAY OF FERRITIN: CPT

## 2024-10-24 PROCEDURE — 83540 ASSAY OF IRON: CPT

## 2024-10-24 PROCEDURE — 25010000002 HEPARIN LOCK FLUSH PER 10 UNITS

## 2024-10-24 PROCEDURE — 96413 CHEMO IV INFUSION 1 HR: CPT

## 2024-10-24 RX ORDER — HEPARIN SODIUM (PORCINE) LOCK FLUSH IV SOLN 100 UNIT/ML 100 UNIT/ML
500 SOLUTION INTRAVENOUS AS NEEDED
Status: DISCONTINUED | OUTPATIENT
Start: 2024-10-24 | End: 2024-10-24 | Stop reason: HOSPADM

## 2024-10-24 RX ADMIN — SODIUM CHLORIDE 1000 MG: 9 INJECTION, SOLUTION INTRAVENOUS at 15:05

## 2024-10-24 RX ADMIN — HEPARIN 500 UNITS: 100 SYRINGE at 16:13

## 2024-10-28 ENCOUNTER — DOCUMENTATION (OUTPATIENT)
Dept: ONCOLOGY | Facility: CLINIC | Age: 79
End: 2024-10-28
Payer: MEDICARE

## 2024-10-29 ENCOUNTER — SPECIALTY PHARMACY (OUTPATIENT)
Dept: PHARMACY | Facility: HOSPITAL | Age: 79
End: 2024-10-29
Payer: MEDICARE

## 2024-10-30 NOTE — SIGNIFICANT NOTE
Pt refused     
Pt states she doesn't feel like she needs the breathing treatment, will ask if needed.  
no

## 2024-11-04 ENCOUNTER — DOCUMENTATION (OUTPATIENT)
Dept: ONCOLOGY | Facility: CLINIC | Age: 79
End: 2024-11-04
Payer: MEDICARE

## 2024-11-06 ENCOUNTER — RESEARCH ENCOUNTER (OUTPATIENT)
Dept: OTHER | Facility: OTHER | Age: 79
End: 2024-11-06
Payer: MEDICARE

## 2024-11-06 ENCOUNTER — OFFICE VISIT (OUTPATIENT)
Dept: CARDIOLOGY | Facility: CLINIC | Age: 79
End: 2024-11-06
Payer: MEDICARE

## 2024-11-06 ENCOUNTER — LAB (OUTPATIENT)
Dept: ONCOLOGY | Facility: HOSPITAL | Age: 79
End: 2024-11-06
Payer: MEDICARE

## 2024-11-06 ENCOUNTER — INFUSION (OUTPATIENT)
Dept: ONCOLOGY | Facility: HOSPITAL | Age: 79
End: 2024-11-06
Payer: MEDICARE

## 2024-11-06 VITALS
OXYGEN SATURATION: 92 % | WEIGHT: 215 LBS | HEART RATE: 76 BPM | BODY MASS INDEX: 31.75 KG/M2 | RESPIRATION RATE: 18 BRPM | DIASTOLIC BLOOD PRESSURE: 62 MMHG | SYSTOLIC BLOOD PRESSURE: 152 MMHG | TEMPERATURE: 97.3 F

## 2024-11-06 VITALS
SYSTOLIC BLOOD PRESSURE: 141 MMHG | BODY MASS INDEX: 31.58 KG/M2 | HEART RATE: 64 BPM | HEIGHT: 69 IN | OXYGEN SATURATION: 91 % | DIASTOLIC BLOOD PRESSURE: 82 MMHG | WEIGHT: 213.2 LBS

## 2024-11-06 DIAGNOSIS — C34.11 PRIMARY CANCER OF RIGHT UPPER LOBE OF LUNG: ICD-10-CM

## 2024-11-06 DIAGNOSIS — I10 ESSENTIAL HYPERTENSION: Chronic | ICD-10-CM

## 2024-11-06 DIAGNOSIS — C34.91 NON-SMALL CELL CANCER OF RIGHT LUNG: ICD-10-CM

## 2024-11-06 DIAGNOSIS — I48.0 PAROXYSMAL ATRIAL FIBRILLATION: Chronic | ICD-10-CM

## 2024-11-06 DIAGNOSIS — R53.83 OTHER FATIGUE: ICD-10-CM

## 2024-11-06 DIAGNOSIS — I50.32 CHRONIC HEART FAILURE WITH PRESERVED EJECTION FRACTION: Primary | Chronic | ICD-10-CM

## 2024-11-06 DIAGNOSIS — D50.9 IRON DEFICIENCY ANEMIA, UNSPECIFIED IRON DEFICIENCY ANEMIA TYPE: ICD-10-CM

## 2024-11-06 DIAGNOSIS — Z95.828 PORT-A-CATH IN PLACE: ICD-10-CM

## 2024-11-06 DIAGNOSIS — E78.5 HYPERLIPIDEMIA LDL GOAL <100: Chronic | ICD-10-CM

## 2024-11-06 DIAGNOSIS — C34.11 PRIMARY CANCER OF RIGHT UPPER LOBE OF LUNG: Primary | ICD-10-CM

## 2024-11-06 LAB
ALBUMIN SERPL-MCNC: 4 G/DL (ref 3.5–5.2)
ALBUMIN/GLOB SERPL: 1.1 G/DL
ALP SERPL-CCNC: 113 U/L (ref 39–117)
ALT SERPL W P-5'-P-CCNC: 9 U/L (ref 1–33)
ANION GAP SERPL CALCULATED.3IONS-SCNC: 10.5 MMOL/L (ref 5–15)
AST SERPL-CCNC: 15 U/L (ref 1–32)
BASOPHILS # BLD AUTO: 0.05 10*3/MM3 (ref 0–0.2)
BASOPHILS NFR BLD AUTO: 0.7 % (ref 0–1.5)
BILIRUB SERPL-MCNC: 0.3 MG/DL (ref 0–1.2)
BUN SERPL-MCNC: 24 MG/DL (ref 8–23)
BUN/CREAT SERPL: 22 (ref 7–25)
CALCIUM SPEC-SCNC: 9.5 MG/DL (ref 8.6–10.5)
CHLORIDE SERPL-SCNC: 100 MMOL/L (ref 98–107)
CO2 SERPL-SCNC: 25.5 MMOL/L (ref 22–29)
CREAT SERPL-MCNC: 1.09 MG/DL (ref 0.57–1)
DEPRECATED RDW RBC AUTO: 39.7 FL (ref 37–54)
EGFRCR SERPLBLD CKD-EPI 2021: 52.1 ML/MIN/1.73
EOSINOPHIL # BLD AUTO: 0.26 10*3/MM3 (ref 0–0.4)
EOSINOPHIL NFR BLD AUTO: 3.5 % (ref 0.3–6.2)
ERYTHROCYTE [DISTWIDTH] IN BLOOD BY AUTOMATED COUNT: 12.8 % (ref 12.3–15.4)
FERRITIN SERPL-MCNC: 80.96 NG/ML (ref 13–150)
GLOBULIN UR ELPH-MCNC: 3.5 GM/DL
GLUCOSE SERPL-MCNC: 266 MG/DL (ref 65–99)
HCT VFR BLD AUTO: 38.6 % (ref 34–46.6)
HGB BLD-MCNC: 12 G/DL (ref 12–15.9)
IMM GRANULOCYTES # BLD AUTO: 0.02 10*3/MM3 (ref 0–0.05)
IMM GRANULOCYTES NFR BLD AUTO: 0.3 % (ref 0–0.5)
IRON 24H UR-MRATE: 91 MCG/DL (ref 37–145)
IRON SATN MFR SERPL: 23 % (ref 20–50)
LYMPHOCYTES # BLD AUTO: 1.13 10*3/MM3 (ref 0.7–3.1)
LYMPHOCYTES NFR BLD AUTO: 15.4 % (ref 19.6–45.3)
MCH RBC QN AUTO: 26.6 PG (ref 26.6–33)
MCHC RBC AUTO-ENTMCNC: 31.1 G/DL (ref 31.5–35.7)
MCV RBC AUTO: 85.6 FL (ref 79–97)
MONOCYTES # BLD AUTO: 0.47 10*3/MM3 (ref 0.1–0.9)
MONOCYTES NFR BLD AUTO: 6.4 % (ref 5–12)
NEUTROPHILS NFR BLD AUTO: 5.41 10*3/MM3 (ref 1.7–7)
NEUTROPHILS NFR BLD AUTO: 73.7 % (ref 42.7–76)
NRBC BLD AUTO-RTO: 0 /100 WBC (ref 0–0.2)
PLATELET # BLD AUTO: 211 10*3/MM3 (ref 140–450)
PMV BLD AUTO: 10.4 FL (ref 6–12)
POTASSIUM SERPL-SCNC: 4.2 MMOL/L (ref 3.5–5.2)
PROT SERPL-MCNC: 7.5 G/DL (ref 6–8.5)
RBC # BLD AUTO: 4.51 10*6/MM3 (ref 3.77–5.28)
SODIUM SERPL-SCNC: 136 MMOL/L (ref 136–145)
TIBC SERPL-MCNC: 390 MCG/DL (ref 298–536)
TRANSFERRIN SERPL-MCNC: 262 MG/DL (ref 200–360)
WBC NRBC COR # BLD AUTO: 7.34 10*3/MM3 (ref 3.4–10.8)

## 2024-11-06 PROCEDURE — 82728 ASSAY OF FERRITIN: CPT

## 2024-11-06 PROCEDURE — 84466 ASSAY OF TRANSFERRIN: CPT

## 2024-11-06 PROCEDURE — 25810000003 SODIUM CHLORIDE 0.9 % SOLUTION 250 ML FLEX CONT: Performed by: INTERNAL MEDICINE

## 2024-11-06 PROCEDURE — 80053 COMPREHEN METABOLIC PANEL: CPT

## 2024-11-06 PROCEDURE — 85025 COMPLETE CBC W/AUTO DIFF WBC: CPT

## 2024-11-06 PROCEDURE — 25010000002 DURVALUMAB 500 MG/10ML SOLUTION 10 ML VIAL: Performed by: INTERNAL MEDICINE

## 2024-11-06 PROCEDURE — 96413 CHEMO IV INFUSION 1 HR: CPT

## 2024-11-06 PROCEDURE — 25010000002 HEPARIN LOCK FLUSH PER 10 UNITS: Performed by: NURSE PRACTITIONER

## 2024-11-06 PROCEDURE — 83540 ASSAY OF IRON: CPT

## 2024-11-06 RX ORDER — SODIUM CHLORIDE 0.9 % (FLUSH) 0.9 %
20 SYRINGE (ML) INJECTION AS NEEDED
Status: CANCELLED | OUTPATIENT
Start: 2024-11-06

## 2024-11-06 RX ORDER — SODIUM CHLORIDE 9 MG/ML
250 INJECTION, SOLUTION INTRAVENOUS ONCE
Status: DISCONTINUED | OUTPATIENT
Start: 2024-11-06 | End: 2024-11-06 | Stop reason: RX

## 2024-11-06 RX ORDER — SODIUM CHLORIDE 0.9 % (FLUSH) 0.9 %
10 SYRINGE (ML) INJECTION AS NEEDED
Status: DISCONTINUED | OUTPATIENT
Start: 2024-11-06 | End: 2024-11-06 | Stop reason: HOSPADM

## 2024-11-06 RX ORDER — HEPARIN SODIUM (PORCINE) LOCK FLUSH IV SOLN 100 UNIT/ML 100 UNIT/ML
500 SOLUTION INTRAVENOUS AS NEEDED
Status: DISCONTINUED | OUTPATIENT
Start: 2024-11-06 | End: 2024-11-06 | Stop reason: HOSPADM

## 2024-11-06 RX ORDER — LISINOPRIL 10 MG/1
10 TABLET ORAL DAILY
Qty: 30 TABLET | Refills: 2 | Status: SHIPPED | OUTPATIENT
Start: 2024-11-06

## 2024-11-06 RX ORDER — HEPARIN SODIUM (PORCINE) LOCK FLUSH IV SOLN 100 UNIT/ML 100 UNIT/ML
300 SOLUTION INTRAVENOUS ONCE
OUTPATIENT
Start: 2024-11-06

## 2024-11-06 RX ORDER — SODIUM CHLORIDE 0.9 % (FLUSH) 0.9 %
20 SYRINGE (ML) INJECTION AS NEEDED
OUTPATIENT
Start: 2024-11-06

## 2024-11-06 RX ORDER — SODIUM CHLORIDE 0.9 % (FLUSH) 0.9 %
10 SYRINGE (ML) INJECTION AS NEEDED
OUTPATIENT
Start: 2024-11-06

## 2024-11-06 RX ORDER — HEPARIN SODIUM (PORCINE) LOCK FLUSH IV SOLN 100 UNIT/ML 100 UNIT/ML
500 SOLUTION INTRAVENOUS AS NEEDED
OUTPATIENT
Start: 2024-11-06

## 2024-11-06 RX ORDER — HEPARIN SODIUM (PORCINE) LOCK FLUSH IV SOLN 100 UNIT/ML 100 UNIT/ML
300 SOLUTION INTRAVENOUS ONCE
Status: CANCELLED | OUTPATIENT
Start: 2024-11-06

## 2024-11-06 RX ADMIN — HEPARIN 500 UNITS: 100 SYRINGE at 15:39

## 2024-11-06 RX ADMIN — SODIUM CHLORIDE 1000 MG: 9 INJECTION, SOLUTION INTRAVENOUS at 14:32

## 2024-11-06 RX ADMIN — Medication 10 ML: at 15:39

## 2024-11-06 NOTE — PROGRESS NOTES
Niki Antony, APRN  Mayra Hays  1945 11/06/2024    Patient Active Problem List   Diagnosis    Chronic heart failure with preserved ejection fraction    Essential hypertension    Type 2 diabetes mellitus    Abnormal nuclear stress test with moderate to large size anteroapical and lateral wall myocardial ischemia.    Paroxysmal atrial fibrillation    Nocturnal hypoxia    Ventral hernia without obstruction or gangrene    Chronic obstructive pulmonary disease    Former smoker    Gait instability    Chronic respiratory failure with hypoxia    Cigarette nicotine dependence in remission    Acute anemia    Iron deficiency anemia    Malabsorption due to intolerance, not elsewhere classified    Lung mass    Other specified anemias    Primary cancer of right upper lobe of lung    Non-small cell cancer of right lung    Port-A-Cath in place    Hyperlipidemia LDL goal <100    Sepsis due to pneumonia       Dear Niki Antony, APRN:    Subjective     History of Present Illness:    Chief Complaint   Patient presents with    Follow-up     routine       Mayra Hays is a pleasant 79 y.o. female with a past medical history significant for with [facility] on 11/22/2021 showing normal coronary arteries.  She does have diabetes mellitus, she does not currently smoke tobacco anymore but does have 112-year pack year history where she averaged 2 to 3 packs a day for 56 years.  She also has paroxysmal atrial fibrillation anticoagulated with Eliquis, severe COPD and follows with pulmonology, essential hypertension, and dyslipidemia. She also has non small cell lung cancer diagnosed in September 2023.  She comes in today for routine cardiology follow-up.       Mayra has no complaints with open questioning.  She denied any chest pains, shortness of breath, dizziness, or syncope.  Her blood pressure is elevated today and has been persistently elevated in the last few checks.    Allergies   Allergen Reactions    Paclitaxel Anaphylaxis    :      Current Outpatient Medications:     albuterol sulfate  (90 Base) MCG/ACT inhaler, Inhale 2 puffs by mouth Every 4 (Four) Hours As Needed for Wheezing., Disp: 18 g, Rfl: 6    apixaban (ELIQUIS) 5 MG tablet tablet, Take 1 tablet by mouth Every 12 (Twelve) Hours., Disp: 180 tablet, Rfl: 3    atorvastatin (LIPITOR) 20 MG tablet, Take 1 tablet by mouth Daily., Disp: , Rfl:     Budeson-Glycopyrrol-Formoterol (Breztri Aerosphere) 160-9-4.8 MCG/ACT aerosol inhaler, Inhale 2 puffs by mouth 2 (Two) Times a Day., Disp: 10.7 g, Rfl: 6    esomeprazole (nexIUM) 20 MG capsule, Take 1 capsule by mouth Daily., Disp: , Rfl:     hydrOXYzine pamoate (VISTARIL) 25 MG capsule, Take 1 capsule by mouth Every Night., Disp: , Rfl:     ipratropium-albuterol (DUO-NEB) 0.5-2.5 mg/3 ml nebulizer, Take 3 mL by nebulization Every 4 (Four) Hours As Needed for Wheezing or Shortness of Air., Disp: 320 mL, Rfl: 3    lidocaine-prilocaine (EMLA) 2.5-2.5 % cream, Apply to port-a-cath site 30 minutes prior to arrival at infusion center. Cover with plastic wrap., Disp: 30 g, Rfl: 1    lisinopril (PRINIVIL,ZESTRIL) 10 MG tablet, Take 1 tablet by mouth Daily., Disp: 30 tablet, Rfl: 2    metFORMIN (GLUCOPHAGE) 500 MG tablet, Take 1 tablet by mouth 2 (Two) Times a Day As Needed (only takes if blood glucose is above 200). As needed, Disp: , Rfl:     prochlorperazine (COMPAZINE) 10 MG tablet, Take 1 tablet by mouth Every 6 (Six) Hours As Needed for Nausea or Vomiting., Disp: 30 tablet, Rfl: 1    spironolactone (ALDACTONE) 25 MG tablet, Take 1 tablet by mouth Daily., Disp: , Rfl:   No current facility-administered medications for this visit.    Facility-Administered Medications Ordered in Other Visits:     heparin injection 500 Units, 500 Units, Intravenous, PRN, Bailee Gan APRN, 500 Units at 01/26/24 1558    sodium chloride 0.9 % flush 10 mL, 10 mL, Intravenous, Malik LAGUNA Rachael Ann, APRN    The following portions of the patient's  "history were reviewed and updated as appropriate: allergies, current medications, past family history, past medical history, past social history, past surgical history and problem list.    Social History     Tobacco Use    Smoking status: Former     Current packs/day: 0.00     Average packs/day: 3.0 packs/day for 16.7 years (50.2 ttl pk-yrs)     Types: Cigarettes     Start date: 4/3/1998     Quit date: 2015     Years since quittin.8     Passive exposure: Past    Smokeless tobacco: Never   Vaping Use    Vaping status: Never Used   Substance Use Topics    Alcohol use: No    Drug use: No         Objective   Vitals:    24 1041   BP: 141/82   Pulse: 64   SpO2: 91%   Weight: 96.7 kg (213 lb 3.2 oz)   Height: 175.3 cm (69\")     Body mass index is 31.48 kg/m².    ROS    Constitutional:       General: Not in acute distress.     Appearance: Healthy appearance. Well-developed and not in distress. Not diaphoretic.   Eyes:      Conjunctiva/sclera: Conjunctivae normal.      Pupils: Pupils are equal, round, and reactive to light.   HENT:      Head: Normocephalic and atraumatic.   Neck:      Vascular: No carotid bruit or JVD.   Pulmonary:      Effort: Pulmonary effort is normal. No respiratory distress.      Breath sounds: Normal breath sounds.   Cardiovascular:      Normal rate. Regular rhythm.   Edema:     Peripheral edema absent.   Skin:     General: Skin is cool.   Neurological:      Mental Status: Alert, oriented to person, place, and time and oriented to person, place and time.         Lab Results   Component Value Date     10/24/2024    K 3.9 10/24/2024     10/24/2024    CO2 27.9 10/24/2024    BUN 19 10/24/2024    CREATININE 0.87 10/24/2024    GLUCOSE 179 (H) 10/24/2024    CALCIUM 9.0 10/24/2024    AST 14 10/24/2024    ALT 9 10/24/2024    ALKPHOS 144 (H) 10/24/2024    LABIL2 1.1 2023     Lab Results   Component Value Date    CKTOTAL 14 (L) 2018     Lab Results   Component Value Date    " WBC 6.66 10/24/2024    HGB 11.8 (L) 10/24/2024    HCT 38.4 10/24/2024     10/24/2024     Lab Results   Component Value Date    INR 1.15 (H) 06/06/2024    INR 0.99 09/05/2023    INR 1.40 (H) 08/09/2023     Lab Results   Component Value Date    MG 2.2 01/03/2024     Lab Results   Component Value Date    TSH 2.900 10/10/2024    TRIG 106 07/28/2022    HDL 52 07/28/2022    LDL 96 07/28/2022      Lab Results   Component Value Date    BNP 29.0 02/01/2019       During this visit the following were done:  Labs Reviewed []    Labs Ordered []    Radiology Reports Reviewed []    Radiology Ordered []    PCP Records Reviewed []    Referring Provider Records Reviewed []    ER Records Reviewed []    Hospital Records Reviewed []    History Obtained From Family []    Radiology Images Reviewed []    Other Reviewed []    Records Requested []       Procedures    Assessment & Plan    Diagnosis Plan   1. Chronic heart failure with preserved ejection fraction  Basic Metabolic Panel      2. Essential hypertension  lisinopril (PRINIVIL,ZESTRIL) 10 MG tablet      3. Hyperlipidemia LDL goal <100        4. Paroxysmal atrial fibrillation                 Recommendations:  Essential hypertension  Persistently above goal will increase lisinopril to 10 mg  Continue spironolactone.  Will plan to repeat BMP in a few weeks  Chronic HFpEF  Appears euvolemic continue with current regimen    No follow-ups on file.    As always, I appreciate very much the opportunity to participate in the cardiovascular care of your patients.      With Best Regards,    Darek Nelson PA-C

## 2024-11-06 NOTE — RESEARCH
Patient Name:  Mayra Hays  YOB: 1945  Patient Age:  79 y.o.  Patient's Sex:  female    Date of Service:  11/06/2024                         RESEARCH NOTE                  AlyssaTeo  Subject ID #[id number]     Pt returned to clinic for f/u and maintenance treatment.  Alyssa 12-month longitudinal blood specimen collected and shipped per protocol.  Next f/u for study per guidelines is in 6 months.     Thank you.  Megan Sullivan, RN, BSN, CRC

## 2024-11-07 ENCOUNTER — OFFICE VISIT (OUTPATIENT)
Dept: PULMONOLOGY | Facility: CLINIC | Age: 79
End: 2024-11-07
Payer: MEDICARE

## 2024-11-07 VITALS
OXYGEN SATURATION: 94 % | HEART RATE: 62 BPM | HEIGHT: 69 IN | BODY MASS INDEX: 31.49 KG/M2 | TEMPERATURE: 97.4 F | DIASTOLIC BLOOD PRESSURE: 86 MMHG | WEIGHT: 212.6 LBS | SYSTOLIC BLOOD PRESSURE: 138 MMHG

## 2024-11-07 DIAGNOSIS — Z87.891 FORMER SMOKER: ICD-10-CM

## 2024-11-07 DIAGNOSIS — C34.91 NON-SMALL CELL CANCER OF RIGHT LUNG: ICD-10-CM

## 2024-11-07 DIAGNOSIS — J96.11 CHRONIC RESPIRATORY FAILURE WITH HYPOXIA: ICD-10-CM

## 2024-11-07 DIAGNOSIS — J44.9 CHRONIC OBSTRUCTIVE PULMONARY DISEASE, UNSPECIFIED COPD TYPE: Primary | ICD-10-CM

## 2024-11-07 RX ORDER — BUDESONIDE, GLYCOPYRROLATE, AND FORMOTEROL FUMARATE 160; 9; 4.8 UG/1; UG/1; UG/1
2 AEROSOL, METERED RESPIRATORY (INHALATION) 2 TIMES DAILY
Qty: 10.7 G | Refills: 6 | Status: SHIPPED | OUTPATIENT
Start: 2024-11-07

## 2024-11-07 RX ORDER — IPRATROPIUM BROMIDE AND ALBUTEROL SULFATE 2.5; .5 MG/3ML; MG/3ML
3 SOLUTION RESPIRATORY (INHALATION) EVERY 4 HOURS PRN
Qty: 320 ML | Refills: 3 | Status: SHIPPED | OUTPATIENT
Start: 2024-11-07

## 2024-11-07 RX ORDER — ALBUTEROL SULFATE 90 UG/1
2 INHALANT RESPIRATORY (INHALATION) EVERY 4 HOURS PRN
Qty: 18 G | Refills: 6 | Status: SHIPPED | OUTPATIENT
Start: 2024-11-07

## 2024-11-07 NOTE — PROGRESS NOTES
"Chief Complaint  COPD and Wheezing    Subjective          Mayra Hays presents to [facility] for   History of Present Illness      Ms. Hays is a 79 year old female with a medical history significant for COPD, stroke, arthritis, CHF, CKD, diabetes, GERD, hyperlipidemia, hypertension, non-small cell lung cancer, and sleep apnea.     She presents today for follow up on COPD. She states that she has been doing well since her last visit.  She reports that her shortness of breath is at baseline.  She is current taking Breztri BID and albuterol as needed. She is also using neb treatments as needed. She is using supplemental oxygen as needed. She is a former smoker, quitting 9 years ago.  She conitnues to follow with oncology for non-small cell lung cancer.    Objective   Vital Signs:   /86   Pulse 62   Temp 97.4 °F (36.3 °C)   Ht 175.3 cm (69\")   Wt 96.4 kg (212 lb 9.6 oz)   SpO2 94%   BMI 31.40 kg/m²         Physical Exam  GENERAL APPEARANCE: Well developed, well nourished, alert and cooperative, and appears to be in no acute distress.    HEAD: normocephalic. Atraumatic.    EYES: PERRL, EOMI. Vision is grossly intact.    THROAT: Oral cavity and pharynx normal. No inflammation, swelling, exudate, or lesions.     NECK: Neck supple.  No thyromegaly.    CARDIAC: Normal S1 and S2. No S3, S4 or murmurs. Rhythm is regular.     RESPIRATORY:Bilateral air entry positive.  No wheezing, crackles or rhonchi noted.    GI: Positive bowel sounds. Soft, nondistended, nontender.     MUSCULOSKELETAL: No significant deformity or joint abnormality. No edema. Peripheral pulses intact. No varicosities.    NEUROLOGICAL: Strength and sensation symmetric and intact throughout.     PSYCHIATRIC: The mental examination revealed the patient was oriented to person, place, and time.       Estimated body mass index is 31.4 kg/m² as calculated from the following:    Height as of this " "encounter: 175.3 cm (69\").    Weight as of this encounter: 96.4 kg (212 lb 9.6 oz).        Result Review :   The following data was reviewed by: ERASTO Mckeon on 11/07/2024:  Common labs          10/10/2024    14:05 10/24/2024    14:14 11/6/2024    13:21   Common Labs   Glucose 289  179  266    BUN 18  19  24    Creatinine 0.98  0.87  1.09    Sodium 139  140  136    Potassium 4.0  3.9  4.2    Chloride 102  106  100    Calcium 9.1  9.0  9.5    Albumin 4.0  4.1  4.0    Total Bilirubin 0.3  0.2  0.3    Alkaline Phosphatase 122  144  113    AST (SGOT) 12  14  15    ALT (SGPT) 10  9  9    WBC 6.72  6.66  7.34    Hemoglobin 12.0  11.8  12.0    Hematocrit 38.3  38.4  38.6    Platelets 214  208  211           PFT:not a valid study    Low dose lung cancer screening:NA    Previous chest imaging:    CT Chest 8/1/24    FINDINGS:  Soft tissue windows demonstrate no adenopathy within the chest. The  thyroid gland is unremarkable. The heart is normal in size. The aorta is  normal in caliber. There are no pleural or pericardial effusions. Lung  windows redemonstrate airspace disease in the right upper lobe with  volume loss felt to reflect posttreatment related change. Scarring is  present in the anterior left upper lobe and right middle lobe. There is  airspace disease in both lung bases similar in appearance to the prior  exam.. The visualized upper abdomen is unremarkable. Bone windows reveal  no lytic or destructive lesions.     IMPRESSION:  Airspace disease with volume loss in the right upper lobe  similar in appearance to the prior exam felt to reflect posttreatment  related change. Continued follow-up is recommended.        This report was finalized on 8/1/2024 4:12 PM by Gianfranco Nuñez M.D..    Alpha-1 antitrypsin screening:MM    STOP-Bang Score:   NA  [place] Sleepiness Scale:   NA      ABG:    pH No results found for: \"PHART\"   pO2 No results found for: \"PO2ART\"   pCO2 No results found for: \"BLD2YMH\" " "  HCO3 No results found for: \"WOJ5BSK\"                      Assessment and Plan    Problem List Items Addressed This Visit          Hematology and Neoplasia    Non-small cell cancer of right lung    Relevant Medications    ipratropium-albuterol (DUO-NEB) 0.5-2.5 mg/3 ml nebulizer    Budeson-Glycopyrrol-Formoterol (Breztri Aerosphere) 160-9-4.8 MCG/ACT aerosol inhaler    albuterol sulfate  (90 Base) MCG/ACT inhaler       Pulmonary and Pneumonias    Chronic obstructive pulmonary disease - Primary    Overview     Added automatically from request for surgery [id number]         Relevant Medications    ipratropium-albuterol (DUO-NEB) 0.5-2.5 mg/3 ml nebulizer    Budeson-Glycopyrrol-Formoterol (Breztri Aerosphere) 160-9-4.8 MCG/ACT aerosol inhaler    albuterol sulfate  (90 Base) MCG/ACT inhaler    Chronic respiratory failure with hypoxia       Tobacco    Former smoker       Mayra Hays  reports that she quit smoking about 9 years ago. Her smoking use included cigarettes. She started smoking about 26 years ago. She has a 50.2 pack-year smoking history. She has been exposed to tobacco smoke. She has never used smokeless tobacco.         Previous Spirometry was not a valid study, as the patient  was not able to exhale fully.  Continue Breztri BID.  She is using this inhaler as needed.  Educated her  that she needs to be taking it 2 puffs twice a day.    Continue albtuerol inhaler as needed.  Continue duonebs as needed.    Sent refills to pharmacy.    She has supplemental oxygent that she is suppose to be using at night and as needed.  She states that she doesn't use it every night but that she does use it when she feels that she needs it.    Completed radiation and is on maintenance chemotherapy with Durvalumab.    Continue imaging per oncology recommendations.      Follow Up   Return in about 6 months (around 5/7/2025).  Patient was given instructions and counseling regarding her condition or for health " maintenance advice. Please see specific information pulled into the AVS if appropriate.

## 2024-11-11 ENCOUNTER — HOSPITAL ENCOUNTER (OUTPATIENT)
Dept: CT IMAGING | Facility: HOSPITAL | Age: 79
Discharge: HOME | End: 2024-11-11
Admitting: INTERNAL MEDICINE
Payer: MEDICARE

## 2024-11-11 DIAGNOSIS — C34.91 NON-SMALL CELL CANCER OF RIGHT LUNG: ICD-10-CM

## 2024-11-11 PROCEDURE — 71260 CT THORAX DX C+: CPT | Performed by: RADIOLOGY

## 2024-11-11 PROCEDURE — 71260 CT THORAX DX C+: CPT

## 2024-11-11 PROCEDURE — 25510000001 IOPAMIDOL 61 % SOLUTION: Performed by: INTERNAL MEDICINE

## 2024-11-11 RX ORDER — IOPAMIDOL 612 MG/ML
100 INJECTION, SOLUTION INTRAVASCULAR
Status: COMPLETED | OUTPATIENT
Start: 2024-11-11 | End: 2024-11-11

## 2024-11-11 RX ADMIN — IOPAMIDOL 100 ML: 612 INJECTION, SOLUTION INTRAVENOUS at 11:02

## 2024-11-14 ENCOUNTER — DOCUMENTATION (OUTPATIENT)
Dept: ONCOLOGY | Facility: CLINIC | Age: 79
End: 2024-11-14
Payer: MEDICARE

## 2024-11-20 ENCOUNTER — DOCUMENTATION (OUTPATIENT)
Dept: ONCOLOGY | Facility: CLINIC | Age: 79
End: 2024-11-20
Payer: MEDICARE

## 2024-11-20 DIAGNOSIS — C34.91 NON-SMALL CELL CANCER OF RIGHT LUNG: Primary | ICD-10-CM

## 2024-11-20 DIAGNOSIS — C34.11 PRIMARY CANCER OF RIGHT UPPER LOBE OF LUNG: ICD-10-CM

## 2024-11-20 RX ORDER — SODIUM CHLORIDE 9 MG/ML
250 INJECTION, SOLUTION INTRAVENOUS ONCE
OUTPATIENT
Start: 2024-12-05

## 2024-11-20 RX ORDER — SODIUM CHLORIDE 9 MG/ML
250 INJECTION, SOLUTION INTRAVENOUS ONCE
Status: CANCELLED | OUTPATIENT
Start: 2024-11-21

## 2024-11-21 ENCOUNTER — OFFICE VISIT (OUTPATIENT)
Dept: ONCOLOGY | Facility: CLINIC | Age: 79
End: 2024-11-21
Payer: MEDICARE

## 2024-11-21 ENCOUNTER — INFUSION (OUTPATIENT)
Dept: ONCOLOGY | Facility: HOSPITAL | Age: 79
DRG: 871 | End: 2024-11-21
Payer: MEDICARE

## 2024-11-21 ENCOUNTER — DOCUMENTATION (OUTPATIENT)
Dept: ONCOLOGY | Facility: CLINIC | Age: 79
End: 2024-11-21
Payer: MEDICARE

## 2024-11-21 ENCOUNTER — LAB (OUTPATIENT)
Dept: ONCOLOGY | Facility: HOSPITAL | Age: 79
End: 2024-11-21
Payer: MEDICARE

## 2024-11-21 VITALS
SYSTOLIC BLOOD PRESSURE: 127 MMHG | OXYGEN SATURATION: 90 % | DIASTOLIC BLOOD PRESSURE: 66 MMHG | TEMPERATURE: 96.8 F | WEIGHT: 211.5 LBS | RESPIRATION RATE: 18 BRPM | BODY MASS INDEX: 31.23 KG/M2 | HEART RATE: 87 BPM

## 2024-11-21 VITALS
HEART RATE: 87 BPM | RESPIRATION RATE: 18 BRPM | DIASTOLIC BLOOD PRESSURE: 66 MMHG | SYSTOLIC BLOOD PRESSURE: 127 MMHG | WEIGHT: 211.5 LBS | BODY MASS INDEX: 31.23 KG/M2 | OXYGEN SATURATION: 90 % | TEMPERATURE: 96.8 F

## 2024-11-21 DIAGNOSIS — C34.11 PRIMARY CANCER OF RIGHT UPPER LOBE OF LUNG: Primary | ICD-10-CM

## 2024-11-21 DIAGNOSIS — C34.11 PRIMARY CANCER OF RIGHT UPPER LOBE OF LUNG: ICD-10-CM

## 2024-11-21 DIAGNOSIS — C34.91 NON-SMALL CELL CANCER OF RIGHT LUNG: ICD-10-CM

## 2024-11-21 DIAGNOSIS — R53.83 OTHER FATIGUE: ICD-10-CM

## 2024-11-21 DIAGNOSIS — C34.91 NON-SMALL CELL CANCER OF RIGHT LUNG: Primary | ICD-10-CM

## 2024-11-21 DIAGNOSIS — Z95.828 PORT-A-CATH IN PLACE: ICD-10-CM

## 2024-11-21 DIAGNOSIS — D50.9 IRON DEFICIENCY ANEMIA, UNSPECIFIED IRON DEFICIENCY ANEMIA TYPE: ICD-10-CM

## 2024-11-21 LAB
ALBUMIN SERPL-MCNC: 4.2 G/DL (ref 3.5–5.2)
ALBUMIN/GLOB SERPL: 1.2 G/DL
ALP SERPL-CCNC: 112 U/L (ref 39–117)
ALT SERPL W P-5'-P-CCNC: 10 U/L (ref 1–33)
ANION GAP SERPL CALCULATED.3IONS-SCNC: 12.3 MMOL/L (ref 5–15)
AST SERPL-CCNC: 13 U/L (ref 1–32)
BASOPHILS # BLD AUTO: 0.06 10*3/MM3 (ref 0–0.2)
BASOPHILS NFR BLD AUTO: 0.5 % (ref 0–1.5)
BILIRUB SERPL-MCNC: 0.4 MG/DL (ref 0–1.2)
BUN SERPL-MCNC: 36 MG/DL (ref 8–23)
BUN/CREAT SERPL: 26.7 (ref 7–25)
CALCIUM SPEC-SCNC: 9.6 MG/DL (ref 8.6–10.5)
CHLORIDE SERPL-SCNC: 97 MMOL/L (ref 98–107)
CO2 SERPL-SCNC: 27.7 MMOL/L (ref 22–29)
CREAT SERPL-MCNC: 1.35 MG/DL (ref 0.57–1)
DEPRECATED RDW RBC AUTO: 41 FL (ref 37–54)
EGFRCR SERPLBLD CKD-EPI 2021: 40.3 ML/MIN/1.73
EOSINOPHIL # BLD AUTO: 0.09 10*3/MM3 (ref 0–0.4)
EOSINOPHIL NFR BLD AUTO: 0.7 % (ref 0.3–6.2)
ERYTHROCYTE [DISTWIDTH] IN BLOOD BY AUTOMATED COUNT: 13.2 % (ref 12.3–15.4)
GLOBULIN UR ELPH-MCNC: 3.5 GM/DL
GLUCOSE SERPL-MCNC: 281 MG/DL (ref 65–99)
HCT VFR BLD AUTO: 40.6 % (ref 34–46.6)
HGB BLD-MCNC: 12.8 G/DL (ref 12–15.9)
IMM GRANULOCYTES # BLD AUTO: 0.18 10*3/MM3 (ref 0–0.05)
IMM GRANULOCYTES NFR BLD AUTO: 1.5 % (ref 0–0.5)
LYMPHOCYTES # BLD AUTO: 1.24 10*3/MM3 (ref 0.7–3.1)
LYMPHOCYTES NFR BLD AUTO: 10.3 % (ref 19.6–45.3)
MCH RBC QN AUTO: 26.9 PG (ref 26.6–33)
MCHC RBC AUTO-ENTMCNC: 31.5 G/DL (ref 31.5–35.7)
MCV RBC AUTO: 85.3 FL (ref 79–97)
MONOCYTES # BLD AUTO: 0.63 10*3/MM3 (ref 0.1–0.9)
MONOCYTES NFR BLD AUTO: 5.2 % (ref 5–12)
NEUTROPHILS NFR BLD AUTO: 81.8 % (ref 42.7–76)
NEUTROPHILS NFR BLD AUTO: 9.87 10*3/MM3 (ref 1.7–7)
NRBC BLD AUTO-RTO: 0 /100 WBC (ref 0–0.2)
PLATELET # BLD AUTO: 252 10*3/MM3 (ref 140–450)
PMV BLD AUTO: 10.8 FL (ref 6–12)
POTASSIUM SERPL-SCNC: 4.2 MMOL/L (ref 3.5–5.2)
PROT SERPL-MCNC: 7.7 G/DL (ref 6–8.5)
RBC # BLD AUTO: 4.76 10*6/MM3 (ref 3.77–5.28)
SODIUM SERPL-SCNC: 137 MMOL/L (ref 136–145)
T4 FREE SERPL-MCNC: 1.04 NG/DL (ref 0.92–1.68)
TSH SERPL DL<=0.05 MIU/L-ACNC: 2.25 UIU/ML (ref 0.27–4.2)
WBC NRBC COR # BLD AUTO: 12.07 10*3/MM3 (ref 3.4–10.8)

## 2024-11-21 PROCEDURE — 96413 CHEMO IV INFUSION 1 HR: CPT

## 2024-11-21 PROCEDURE — 84443 ASSAY THYROID STIM HORMONE: CPT

## 2024-11-21 PROCEDURE — 25810000003 SODIUM CHLORIDE 0.9 % SOLUTION 250 ML FLEX CONT: Performed by: INTERNAL MEDICINE

## 2024-11-21 PROCEDURE — 80053 COMPREHEN METABOLIC PANEL: CPT

## 2024-11-21 PROCEDURE — 84439 ASSAY OF FREE THYROXINE: CPT

## 2024-11-21 PROCEDURE — 85025 COMPLETE CBC W/AUTO DIFF WBC: CPT

## 2024-11-21 PROCEDURE — 25010000002 HEPARIN LOCK FLUSH PER 10 UNITS: Performed by: NURSE PRACTITIONER

## 2024-11-21 PROCEDURE — 25010000002 DURVALUMAB 500 MG/10ML SOLUTION 10 ML VIAL: Performed by: INTERNAL MEDICINE

## 2024-11-21 RX ORDER — HEPARIN SODIUM (PORCINE) LOCK FLUSH IV SOLN 100 UNIT/ML 100 UNIT/ML
500 SOLUTION INTRAVENOUS AS NEEDED
Status: DISCONTINUED | OUTPATIENT
Start: 2024-11-21 | End: 2025-04-03 | Stop reason: HOSPADM

## 2024-11-21 RX ORDER — SODIUM CHLORIDE 0.9 % (FLUSH) 0.9 %
10 SYRINGE (ML) INJECTION AS NEEDED
Status: CANCELLED | OUTPATIENT
Start: 2024-12-05

## 2024-11-21 RX ORDER — SODIUM CHLORIDE 0.9 % (FLUSH) 0.9 %
10 SYRINGE (ML) INJECTION AS NEEDED
Status: DISCONTINUED | OUTPATIENT
Start: 2024-11-21 | End: 2025-04-03 | Stop reason: HOSPADM

## 2024-11-21 RX ORDER — HEPARIN SODIUM (PORCINE) LOCK FLUSH IV SOLN 100 UNIT/ML 100 UNIT/ML
300 SOLUTION INTRAVENOUS ONCE
Status: CANCELLED | OUTPATIENT
Start: 2024-11-21

## 2024-11-21 RX ORDER — SODIUM CHLORIDE 0.9 % (FLUSH) 0.9 %
20 SYRINGE (ML) INJECTION AS NEEDED
Status: CANCELLED | OUTPATIENT
Start: 2025-01-03

## 2024-11-21 RX ORDER — SODIUM CHLORIDE 9 MG/ML
250 INJECTION, SOLUTION INTRAVENOUS ONCE
Status: DISCONTINUED | OUTPATIENT
Start: 2024-11-21 | End: 2024-11-21

## 2024-11-21 RX ORDER — HEPARIN SODIUM (PORCINE) LOCK FLUSH IV SOLN 100 UNIT/ML 100 UNIT/ML
500 SOLUTION INTRAVENOUS AS NEEDED
Status: CANCELLED | OUTPATIENT
Start: 2024-12-05

## 2024-11-21 RX ADMIN — HEPARIN 500 UNITS: 100 SYRINGE at 16:54

## 2024-11-21 RX ADMIN — SODIUM CHLORIDE 1000 MG: 9 INJECTION, SOLUTION INTRAVENOUS at 15:47

## 2024-11-21 RX ADMIN — Medication 10 ML: at 16:54

## 2024-11-21 NOTE — PROGRESS NOTES
Subjective     Date: 2024    Referring Provider  No ref. provider found    Chief Complaint  Symptomatic anemia   2. Non small Cell Lung Cancer  Cancer Staging   Stage IIIA (cT1c, cN2, cM0)        Subjective      Mayra Hays is a 79 y.o. female who presents today to [facility] for follow up.    HPI:   79 y.o. female with past medical history of diabetes mellitus type 2, hyperlipidemia, COPD, hypertension, atrial fibrillation on anticoagulation (Eliquis), h/o CVA and previous tobacco use presents for symptomatic anemia and NSCLC.    She is present today with her daughter, Hailey, who is assisting with history. Pt started work up for RUL lung nodule , since then noticed to have a decrease in her Hgb to 10.7 from normal in 2023. More recently, her Hgb /9 was 6.5, she was directed to ED where she received 1U PRBC.     Patient reports increased fatigue over the last several months. She reports no bleeding, however does report dark colored stool (melena) two days ago. Denies any other evidence of melena or hematochezia.     She has previously been diagnosed with iron deficiency (years ago) requiring oral iron supplements, but never received IV iron or bone marrow biopsy.    She and her daughter are unsure of her last colonoscopy and endoscopy history, think it may have occurred >5 years ago but unsure.    She denies recent weight loss, fever, chills,  night sweats.  Previous smoker, quit 5-6 years ago, smoked 3 packs/day X 30 years. Denies alcohol or drug use. Family history significant for son with leukemia, brothers with lung cancer.     Oncology History:  2023: low dose CT chest screenin.9 cm spiculated right upper lobe pulmonary nodule concerning for malignancy, PET/CT recommended.   2023: EBUS by Dr. Peter negative for malignancy for bronchial washing and FNA of station 10R  2023: PET/CT: Right upper lobe pulmonary nodule, more  posterior possible satellite nodules are postobstructive process measuring 2.1 cm with mSUV 14.5. Also with pretracheal region lymph node 2.6 cm.   6/29/2023: CT guided needle biopsy was non diagnostic, showed fibrosis and chronic inflammation.   8/9/2023: Lab work showed anemia with Hgb 6.5. Referred to ED. Bronchoscopy deferred.   9/6/2023: Navigational bronchoscopy performed by Dr. Clemens- Right upper lobe lung transbronchial biopsy revealed squamous cell carcinoma, lymph node at station 4R also involved with squamous cell carcinoma. PD-L1 TPS 60%. Patient not deemed a surgical candidate.  9/13-11/21/2023: Received weekly carboplatin  X 7 and radiation. Patient had a reaction to paclitaxel, it was omitted.   12/19/2023: Post treatment CT chest with improvement of masslike process RUL now 1.6 cm, suggest significant response to treatment. Mediastinal lymph nodes are now within normal limits of size.     Ms. Hays presents today with her daughter, grand daughter, and great grand daughters. She is in a wheelchair. She lives alone with her grand son, able to perform her ADLs including cooking, cleaning.     Treatment history:        2.       Interval History 10/11/2023   Ms. Hays presents for follow up today, accompanied by her daughter in law. She started treatment with paclitaxel and carboplatin on 10/9, unfortunately had a anaphylactic reaction to taxol after 8 minutes of infusion causing her to go to the ED. Patient was awake and feeling back to herself at the time of ED visit. Carboplatin was not administered.     She reports doing well overall, no new symptoms. Started radiation therapy yesterday.      Interval History 10/24/2023   Ms. Hays presents for cycle 2 of carboplatin with concurrent radiation. Did well with first cycle. Denies any adverse effects including nausea, vomiting, diarrhea, neuropathy. Appetite is good. Radiation is going well. No complaints today.  Denies any skin rash or  bites.    Interval History 11/21/2023   Ms. Hays presents today for cycle 7 of carboplatin with concurrent radiation. She reports good tolerance thus far without neuropathy, nausea, vomiting, diarrhea. Appetite is stable.   Denies any GIB.    Interval History 01/11/2024   Ms. Hays presents after completion of therapy. She was admitted to the hospital 12/31 due to shortness of breath, found to have Afib with RVR and coronavirus HKU. She received inpatient antibiotics and steroids. Currently with improvement, still reports some fatigue.   We reviewed her last CT chest, which shows improvement after treatment. Discussed continuing immunotherapy.      Interval History 02/22/2024   Ms. Hays presents today for follow, prior to cycle 3 of maintenance durvalumab. Accompanied by her grand daughter's fiance. She reports improved energy. Denies any bleeding, nausea, vomiting, diarrhea, rash. On examination, noted to have petechia on bilateral distal lower extremities and R distal upper extremities, she believes these started today. Denies pruritus.     Interval History 03/21/2024    presents prior to C5 of consolidation therapy with durvalumab. She reports good tolerance to the last treatment, denies shortness of breath, diarrhea, nausea/vomiting, fatigue. She's been applying lotion to her legs, petechia has decreased, primarily just on her left leg now.     Interval History 04/18/2024   Ms. Hays presents prior to C7 of durvalumab. Reports good tolerance without nausea, vomiting, diarrhea. No changes in petechia. Denies any other complaints.    Interval History 06/13/2024   Ms. Hays presents for follow up prior to cycle 11 of durvalumab. She presented to ED 6/6 due to shortness of breath and cough. CXR showed RUL and left basilar airspace disease for which she received levofloxacin. Today with improvement in symptoms. Denies diarrhea, nausea, vomiting.     Interval History 07/18/2024   Ms. Hays presents for  follow-up, prior to cycle 13 of consolidation Durvalumab. She reports good tolerance, denies any cough/shortness of breath, NVD. Continues to have non pruritic petechia on her legs.     Interval History 08/15/2024   Ms. Hays presents for follow up, prior to C 15 of Durvalumab. Surveillance CT chest 8/1 shows airspace disease with volume loss right upper lobe similar to prior exam, felt to reflect post treatment related change.   Denies any new symptoms, continues to do well with tx.    Interval History 08/29/2024  Ms. Hays presents today for follow up, prior to cycle 16 Durvalumab. Overall, she reports that she has been doing well since her last visit. She continues to tolerate the treatments well and without any noticeable side effects. She reports a good appetite, hydrating well. Denies any nausea/vomiting or diarrhea. No rash. She is without any specific complaints at this time.     Interval History 09/12/2024   Ms. Hays presents for follow up, prior to C17 of consolidation therapy with durvalumab. She reports doing well without nausea, vomiting, diarrhea. No changes in breathing or cough. Continues to use intermittent oxygen. No new symptoms.     Interval History 11/21/2024   Ms. Hays presents for C22 of durvalumab. Surveillance CT chest shows volume loss right suprahilar consistent with evolving fibrosis, no pulmonary nodule or mass right lung to suggest recurrent disease, no hilar mediastinal adenopathy, centrilobular nodules throughout left mid lower lung zones consistent with atypical pneumonia or aspiration pneumonitis.     She does report recovering from a recent pneumonia, received antibiotics from her PCP. Denies fever/chills. No other adverse effects with last treatments.    Objective     Objective     Allergy:   Allergies   Allergen Reactions    Paclitaxel Anaphylaxis        Current Medications:   Current Outpatient Medications   Medication Sig Dispense Refill    albuterol sulfate  (90 Base)  MCG/ACT inhaler Inhale 2 puffs by mouth Every 4 (Four) Hours As Needed for Wheezing. 18 g 6    apixaban (ELIQUIS) 5 MG tablet tablet Take 1 tablet by mouth Every 12 (Twelve) Hours. 180 tablet 3    atorvastatin (LIPITOR) 20 MG tablet Take 1 tablet by mouth Daily.      Budeson-Glycopyrrol-Formoterol (Breztri Aerosphere) 160-9-4.8 MCG/ACT aerosol inhaler Inhale 2 puffs by mouth 2 (Two) Times a Day. 10.7 g 6    esomeprazole (nexIUM) 20 MG capsule Take 1 capsule by mouth Daily.      hydrOXYzine pamoate (VISTARIL) 25 MG capsule Take 1 capsule by mouth Every Night.      ipratropium-albuterol (DUO-NEB) 0.5-2.5 mg/3 ml nebulizer Take 3 mL by nebulization Every 4 (Four) Hours As Needed for Wheezing or Shortness of Air. 320 mL 3    lidocaine-prilocaine (EMLA) 2.5-2.5 % cream Apply to port-a-cath site 30 minutes prior to arrival at infusion center. Cover with plastic wrap. 30 g 1    lisinopril (PRINIVIL,ZESTRIL) 10 MG tablet Take 1 tablet by mouth Daily. 30 tablet 2    metFORMIN (GLUCOPHAGE) 500 MG tablet Take 1 tablet by mouth 2 (Two) Times a Day As Needed (only takes if blood glucose is above 200). As needed      prochlorperazine (COMPAZINE) 10 MG tablet Take 1 tablet by mouth Every 6 (Six) Hours As Needed for Nausea or Vomiting. 30 tablet 1    spironolactone (ALDACTONE) 25 MG tablet Take 1 tablet by mouth Daily.       No current facility-administered medications for this visit.     Facility-Administered Medications Ordered in Other Visits   Medication Dose Route Frequency Provider Last Rate Last Admin    heparin injection 500 Units  500 Units Intravenous PRN Bailee Gan APRN   500 Units at 01/26/24 1558    sodium chloride 0.9 % flush 10 mL  10 mL Intravenous PRN Bailee Gan APRN           Past Medical History:  Past Medical History:   Diagnosis Date    Arthritis     CHF (congestive heart failure)     Chronic anticoagulation     Eliquis    Chronic kidney disease     stage II/IIIa    Collapsed lung     COPD  (chronic obstructive pulmonary disease)     Diabetes mellitus     TYPE 2    Elevated cholesterol     GERD (gastroesophageal reflux disease)     Hyperlipidemia     Hypertension     Non-small cell lung cancer RUL     Sleep apnea     non compliant with CPAP    Stroke 2019    Wears eyeglasses        Past Surgical History:  Past Surgical History:   Procedure Laterality Date    BRONCHOSCOPY Bilateral 02/27/2023    Procedure: BRONCHOSCOPY WITH ENDOBRONCHIAL ULTRASOUND;  Surgeon: Abdulkadir Peter MD;  Location: [facility] OR;  Service: Pulmonary;  Laterality: Bilateral;    BRONCHOSCOPY WITH ION ROBOTIC ASSIST N/A 9/6/2023    Procedure: BRONCHOSCOPY WITH ION ROBOT AND EBUS;  Surgeon: John Clemens MD;  Location:  [facility];  Service: Robotics - Pulmonary;  Laterality: N/A;  Ion cath #6 - 0032,  #6 - 0030, cath guide # 0077. EBUS scope removed with balloon intact.    CARDIAC CATHETERIZATION N/A 08/22/2017    Procedure: Left Heart Cath;  Surgeon: Jatinder Matias MD;  Location:  [facility];  Service:     CARDIAC CATHETERIZATION N/A 11/22/2021    Procedure: Left Heart Cath;  Surgeon: Tolu Steinberg MD;  Location:  [facility];  Service: Cardiology;  Laterality: N/A;    COLONOSCOPY      ENDOSCOPY      GALLBLADDER SURGERY      PORTACATH PLACEMENT N/A 9/29/2023    Procedure: INSERTION OF PORTACATH;  Surgeon: Petr Velásquez MD;  Location: [facility] OR;  Service: General;  Laterality: N/A;    VENTRAL HERNIA REPAIR N/A 05/14/2020    Procedure: VENTRAL HERNIA REPAIR LAPAROSCOPIC WITH DAVINCI ROBOT;  Surgeon: Petr Velásquez MD;  Location: [facility] OR;  Service: DaVinci;  Laterality: N/A;       Social History:  Social History     Socioeconomic History    Marital status: Single    Number of children: 6   Tobacco Use    Smoking status: Former     Current packs/day: 0.00     Average packs/day: 3.0 packs/day for 16.7 years (50.2 ttl pk-yrs)     Types: Cigarettes     Start date:  4/3/1998     Quit date: 2015     Years since quittin.8     Passive exposure: Past    Smokeless tobacco: Never   Vaping Use    Vaping status: Never Used   Substance and Sexual Activity    Alcohol use: No    Drug use: No    Sexual activity: Never         Family History:  Family History   Problem Relation Age of Onset    Heart disease Mother         Rhianna sophia    Heart disease Father     Heart attack Father     Heart failure Father        Review of Systems:  Review of Systems   Constitutional:  Positive for fatigue.   All other systems reviewed and are negative.      Vital Signs:   /66   Pulse 87   Temp 96.8 °F (36 °C) (Temporal)   Resp 18   Wt 95.9 kg (211 lb 8 oz)   SpO2 90%   BMI 31.23 kg/m²      Physical Exam:  Physical Exam  Vitals reviewed.   Constitutional:       General: She is not in acute distress.     Appearance: Normal appearance. She is obese. She is not ill-appearing.      Comments: +hard of hearing   HENT:      Head: Normocephalic and atraumatic.      Mouth/Throat:      Mouth: Mucous membranes are moist.      Pharynx: Oropharynx is clear.   Eyes:      Conjunctiva/sclera: Conjunctivae normal.      Pupils: Pupils are equal, round, and reactive to light.   Cardiovascular:      Rate and Rhythm: Normal rate and regular rhythm.      Heart sounds: No murmur heard.  Pulmonary:      Effort: Pulmonary effort is normal. No respiratory distress.      Breath sounds: Wheezing present.   Chest:      Comments: +R chest port  Abdominal:      General: Abdomen is flat. Bowel sounds are normal. There is no distension.      Palpations: Abdomen is soft. There is no mass.      Tenderness: There is no abdominal tenderness. There is no guarding.   Musculoskeletal:         General: No swelling. Normal range of motion.      Cervical back: Normal range of motion.   Lymphadenopathy:      Cervical: No cervical adenopathy.   Skin:     General: Skin is warm and dry.      Findings: Petechiae present.      Comments:  LLE    Neurological:      General: No focal deficit present.      Mental Status: She is alert and oriented to person, place, and time. Mental status is at baseline.   Psychiatric:         Mood and Affect: Mood normal.         PHQ-9 Score  PHQ-9 Total Score:       Pain Score  Vitals:    11/21/24 1409   BP: 127/66   Pulse: 87   Resp: 18   Temp: 96.8 °F (36 °C)   TempSrc: Temporal   SpO2: 90%   Weight: 95.9 kg (211 lb 8 oz)   PainSc: 0-No pain                                     PAINSCOREQUALITYMETRIC:   Vitals:    11/21/24 1409   PainSc: 0-No pain                            Lab Review  Lab Results   Component Value Date    WBC 12.07 (H) 11/21/2024    HGB 12.8 11/21/2024    HCT 40.6 11/21/2024    MCV 85.3 11/21/2024    RDW 13.2 11/21/2024     11/21/2024    NEUTRORELPCT 81.8 (H) 11/21/2024    LYMPHORELPCT 10.3 (L) 11/21/2024    MONORELPCT 5.2 11/21/2024    EOSRELPCT 0.7 11/21/2024    BASORELPCT 0.5 11/21/2024    NEUTROABS 9.87 (H) 11/21/2024    LYMPHSABS 1.24 11/21/2024     Lab Results   Component Value Date     11/06/2024    K 4.2 11/06/2024    CO2 25.5 11/06/2024     11/06/2024    BUN 24 (H) 11/06/2024    CREATININE 1.09 (H) 11/06/2024    EGFRIFNONA 51 (L) 11/22/2021    GLUCOSE 266 (H) 11/06/2024    CALCIUM 9.5 11/06/2024    ALKPHOS 113 11/06/2024    AST 15 11/06/2024    ALT 9 11/06/2024    BILITOT 0.3 11/06/2024    ALBUMIN 4.0 11/06/2024    PROTEINTOT 7.5 11/06/2024    MG 2.2 01/03/2024    PHOS 3.5 05/18/2020     Lab Results   Component Value Date    RETICCTPCT 4.26 (H) 08/16/2023     Lab Results   Component Value Date    FERRITIN 80.96 11/06/2024    IRON 91 11/06/2024    TIBC 390 11/06/2024    LABIRON 23 11/06/2024    DOAGCFRU26 548 08/14/2023    FOLATE 12.90 08/14/2023        Radiology Results    CT Chest With Contrast Diagnostic (11/11/2024 11:02)     IMPRESSION:  1.  Soft tissue density with volume loss and air bronchograms emanating  from the right suprahilar aspect has features on CT most  consistent with  evolving fibrosis.  2.  No discrete pulmonary nodule or mass has developed in the right lung  to suggest recurrent disease.  3.  No hilar or mediastinal adenopathy based on CT size criteria.  4.  Centrilobular nodules throughout the left mid-lower lung zones noted  most consistent with atypical pneumonia or aspiration pneumonitis.  5.  Mild interstitial thickening favors mild edema in the setting of CHF  or volume overload.    CT Chest With Contrast Diagnostic (08/01/2024 14:14)     FINDINGS:  Soft tissue windows demonstrate no adenopathy within the chest. The  thyroid gland is unremarkable. The heart is normal in size. The aorta is  normal in caliber. There are no pleural or pericardial effusions. Lung  windows redemonstrate airspace disease in the right upper lobe with  volume loss felt to reflect posttreatment related change. Scarring is  present in the anterior left upper lobe and right middle lobe. There is  airspace disease in both lung bases similar in appearance to the prior  exam.. The visualized upper abdomen is unremarkable. Bone windows reveal  no lytic or destructive lesions.     IMPRESSION:  Airspace disease with volume loss in the right upper lobe  similar in appearance to the prior exam felt to reflect posttreatment  related change. Continued follow-up is recommended.      CT Chest With Contrast Diagnostic (05/01/2024 09:01)     IMPRESSION:  1. Development of airspace disease in the right upper lobe which is in  the region of a previously noted spiculated nodule likely reflecting  posttreatment related change.  2. Interval improvement in the previously noted bilateral lower lobe  airspace disease with a few patchy opacities remaining.    CT Chest Without Contrast Diagnostic (12/31/2023 20:30)   IMPRESSION:  Spiculated nodule of the right upper lobe concerning for neoplasm.  Masslike consolidation in the right lower lobe could represent pneumonia  with underlying mass not excluded.  Atelectasis/airspace disease in the  left lower lobe and lingula. Mediastinal adenopathy. Follow-up  recommended.    CT Chest With Contrast Diagnostic (12/19/2023 14:47)     IMPRESSION:  1.  Significant decrease size and masslike appearance of process in the  right upper lobe which would suggest significant response to treatment.  2.  No suspicious pulmonary nodules or masses identified.  3.  Mediastinal lymph nodes are now within normal limits for size.  No  hilar or mediastinal adenopathy.  4.  Mild centrilobular nodularity of the left lower lobe which may  reflect changes of atypical pneumonia or aspiration pneumonitis.  5.  Stable cardiomegaly.  6.  Other incidental/nonacute findings above.      CT Angiogram Chest Pulmonary Embolism (10/09/2023 16:41)   MPRESSION:  1.  Right upper lobe mass in association with pneumonia has increased in  size and extent when compared to the previous exam with extension to the  pleural surface. Masslike component is approximately 56.8 x 44.5 mm and  was previously 40.1 x 36.7 mm. This would correspond to primary  malignancy.  2.  Central bronchial wall thickening. Can be seen with reactive airway  disease or bronchitis.  3.  1.3 cm left adrenal nodule is noted. No significant change from  previous.  4.  Increased size of paratracheal and mediastinal lymph nodes. A right  paratracheal lymph node is 2.5 cm and was previously 2.2 cm.  5. No PE identified.  6. Marked cardiomegaly, stable.  7. Otherwise stable chest with other nonacute/incidental findings as  above.    CT Head Without Contrast (10/09/2023 12:00)   IMPRESSION:    Unremarkable exam demonstrating no CT evidence of acute intracranial  findings.      CT Chest Without Contrast Diagnostic (08/09/2023 12:38)       NM PET/CT Skull Base to Mid Thigh (06/20/2023 11:12)         CT Chest Low Dose Cancer Screening WO (02/14/2023 12:58)   IMPRESSION:    Interval development or enlargement of a 1.9 cm spiculated right upper  lobe  pulmonary nodule concerning in appearance for malignancy and PET/CT  is recommended for further evaluation.        Pathology:     Tissue Pathology Exam (09/06/2023 08:13)         6/29/23           Assessment / Plan         Assessment and Plan   Mayra Hays is a 79 y.o. year old presents for       Non small cell lung cancer, squamous cell    Cancer Staging   Stage IIIA (cT1c, cN2, cM0)  -Oncology history as above. Patient with 1.9 cm spiculated right upper lobe nodule on low dose CT chest screen (2/2023). EBUS by Dr. Peter negative for malignancy (2/2023). PET/CT with hypermetabolism RUL 2.1 cm and pretracheal region lymph node (6/2023). CT guided biopsy negative for malignancy (6/2023). Navigational bronchoscopy with positive RUL and  4R (paratracheal) for squamous cell (9/6/2023). PD-L1 TPS score 60%.   -Completed weekly carboplatin AUC2. Patient experienced anaphylaxis 8 minutes into the paclitaxel on first cycle 10/9. Due to this, paclitaxel will be omitted from weekly chemotherapy.  -Radiation therapy 10/10; 6000 cGy in 30 treatment fractions. Complete 11/21/2023  -Post treatment CT 12/19/23 with good/partial response  -Cont consolidation therapy with durvalumab every 2 weeks; C22/26 today. Repeat CT chest 2/2025    2. Anemia; iron deficiency anemia due to GIB  - Patient with normal H/H in our system 2/2023, with acute drop in Hgb 6/29 (10.7) to Hgb (6.5) on 8/9 requiring transfusion. Patient reports one episode of melena two days ago (she is a poor historian).   -Last colonoscopy and endoscopy are unknown; think it may have been >5 years ago  -CBC from 8/14 show Hgb 8.2, Hct 27, MCV 90. Normal WBC and platelet count. Iron studies with normal iron (37), TIBC (435), and low iron saturation (9). This is in light of recent blood transfusion. Normal folate and B12 level. Reticulocyte count noted to be elevated to 6%  -Patient is with fatigue. Denies shortness of breath (aside from baseline COPD) or chest pain.  Continues to be on Eliquis for Afib  -Initial work up from consultation confirmed iron deficiency anemia, normal folate/b12 levels. No indication of hemolysis seen with normal LDH and haptoglobin. Myeloma work up has been negative with no M spike on serum protein electrophoresis and normal k/l free light chain ratio. Peripheral smear with normal total WBC without granulocytic dysplasia or blasts.  -Received Ferumoxytol 8/29/2023 and 11/27/23  -Pt to see surgery post treatment for repeat EGD and Colonoscopy     3. Malignancy associated pain  - Currently denies    4. Surveillance (per [facility]CCN)  -Surveillance would include CT with and without contrast every 3 to 6 months for 3 years, then every 6 months for 2 years, then low-dose noncontrast CT annually.   Next CT 2/2025    5. Petechia of lower extremities -resolved   - Normal Plt counts    Discussed possible differential diagnoses, testing, treatment, recommended non-pharmacological interventions, risks, warning signs to monitor for that would indicate need for follow-up in clinic or ER. If no improvement with these regimens or you have new or worsening symptoms follow-up. Patient verbalizes understanding and agreement with plan of care. Denies further needs or concerns.     Patient was given instructions and counseling regarding her condition and for health maintenance advised.       All questions were answered to her satisfaction.              Meds ordered during this visit  No orders of the defined types were placed in this encounter.      Visit Diagnoses    ICD-10-CM ICD-9-CM   1. Primary cancer of right upper lobe of lung  C34.11 162.3   2. Non-small cell cancer of right lung  C34.91 162.9   3. Iron deficiency anemia, unspecified iron deficiency anemia type  D50.9 280.9                               Follow Up   In 1 month with tx with CBC, iron studies, ferritin, CMP        This document has been electronically signed by Shawna Bond MD   November 21, 2024 14:58  EST    Dictated Utilizing Dragon Dictation: Part of this note may be an electronic transcription/translation of spoken language to printed text using the Dragon Dictation System.

## 2024-11-23 ENCOUNTER — APPOINTMENT (OUTPATIENT)
Dept: CT IMAGING | Facility: HOSPITAL | Age: 79
DRG: 871 | End: 2024-11-23
Payer: MEDICARE

## 2024-11-23 ENCOUNTER — HOSPITAL ENCOUNTER (INPATIENT)
Facility: HOSPITAL | Age: 79
LOS: 4 days | Discharge: HOME | DRG: 871 | End: 2024-11-27
Attending: FAMILY MEDICINE | Admitting: STUDENT IN AN ORGANIZED HEALTH CARE EDUCATION/TRAINING PROGRAM
Payer: MEDICARE

## 2024-11-23 ENCOUNTER — APPOINTMENT (OUTPATIENT)
Dept: GENERAL RADIOLOGY | Facility: HOSPITAL | Age: 79
DRG: 871 | End: 2024-11-23
Payer: MEDICARE

## 2024-11-23 DIAGNOSIS — R65.20 SEPSIS WITH ACUTE RENAL FAILURE, DUE TO UNSPECIFIED ORGANISM, UNSPECIFIED ACUTE RENAL FAILURE TYPE, UNSPECIFIED WHETHER SEPTIC SHOCK PRESENT: Primary | ICD-10-CM

## 2024-11-23 DIAGNOSIS — A41.9 SEPSIS WITH ACUTE RENAL FAILURE, DUE TO UNSPECIFIED ORGANISM, UNSPECIFIED ACUTE RENAL FAILURE TYPE, UNSPECIFIED WHETHER SEPTIC SHOCK PRESENT: Primary | ICD-10-CM

## 2024-11-23 DIAGNOSIS — N17.9 SEPSIS WITH ACUTE RENAL FAILURE, DUE TO UNSPECIFIED ORGANISM, UNSPECIFIED ACUTE RENAL FAILURE TYPE, UNSPECIFIED WHETHER SEPTIC SHOCK PRESENT: Primary | ICD-10-CM

## 2024-11-23 DIAGNOSIS — J18.9 MULTIFOCAL PNEUMONIA: ICD-10-CM

## 2024-11-23 DIAGNOSIS — R91.8 LUNG MASS: ICD-10-CM

## 2024-11-23 PROBLEM — R65.21 SEPTIC SHOCK DUE TO UNDETERMINED ORGANISM: Status: ACTIVE | Noted: 2024-11-23

## 2024-11-23 LAB
A-A DO2: 42.1 MMHG (ref 0–300)
ALBUMIN SERPL-MCNC: 4.3 G/DL (ref 3.5–5.2)
ALBUMIN/GLOB SERPL: 1.2 G/DL
ALP SERPL-CCNC: 112 U/L (ref 39–117)
ALT SERPL W P-5'-P-CCNC: 17 U/L (ref 1–33)
ANION GAP SERPL CALCULATED.3IONS-SCNC: 15.3 MMOL/L (ref 5–15)
ANION GAP SERPL CALCULATED.3IONS-SCNC: 20.7 MMOL/L (ref 5–15)
ARTERIAL PATENCY WRIST A: ABNORMAL
AST SERPL-CCNC: 19 U/L (ref 1–32)
ATMOSPHERIC PRESS: 728 MMHG
BASE EXCESS BLDA CALC-SCNC: -5.3 MMOL/L (ref 0–2)
BDY SITE: ABNORMAL
BILIRUB SERPL-MCNC: 0.8 MG/DL (ref 0–1.2)
BILIRUB UR QL STRIP: NEGATIVE
BUN SERPL-MCNC: 47 MG/DL (ref 8–23)
BUN SERPL-MCNC: 50 MG/DL (ref 8–23)
BUN/CREAT SERPL: 14.4 (ref 7–25)
BUN/CREAT SERPL: 16.8 (ref 7–25)
CA-I SERPL ISE-MCNC: 1.12 MMOL/L (ref 1.15–1.3)
CALCIUM SPEC-SCNC: 8.3 MG/DL (ref 8.6–10.5)
CALCIUM SPEC-SCNC: 9.8 MG/DL (ref 8.6–10.5)
CHLORIDE SERPL-SCNC: 100 MMOL/L (ref 98–107)
CHLORIDE SERPL-SCNC: 105 MMOL/L (ref 98–107)
CLARITY UR: CLEAR
CO2 BLDA-SCNC: 21.4 MMOL/L (ref 22–33)
CO2 SERPL-SCNC: 19.3 MMOL/L (ref 22–29)
CO2 SERPL-SCNC: 20.7 MMOL/L (ref 22–29)
COHGB MFR BLD: 1.5 % (ref 0–5)
COLOR UR: YELLOW
CREAT SERPL-MCNC: 2.98 MG/DL (ref 0.57–1)
CREAT SERPL-MCNC: 3.26 MG/DL (ref 0.57–1)
CRP SERPL-MCNC: 18.67 MG/DL (ref 0–0.5)
D-LACTATE SERPL-SCNC: 1.5 MMOL/L (ref 0.5–2)
D-LACTATE SERPL-SCNC: 2.8 MMOL/L (ref 0.5–2)
D-LACTATE SERPL-SCNC: 4 MMOL/L (ref 0.5–2)
D-LACTATE SERPL-SCNC: 4.5 MMOL/L (ref 0.5–2)
DEPRECATED RDW RBC AUTO: 42 FL (ref 37–54)
DEPRECATED RDW RBC AUTO: 44.1 FL (ref 37–54)
EGFRCR SERPLBLD CKD-EPI 2021: 14 ML/MIN/1.73
EGFRCR SERPLBLD CKD-EPI 2021: 15.6 ML/MIN/1.73
ERYTHROCYTE [DISTWIDTH] IN BLOOD BY AUTOMATED COUNT: 13.3 % (ref 12.3–15.4)
ERYTHROCYTE [DISTWIDTH] IN BLOOD BY AUTOMATED COUNT: 13.6 % (ref 12.3–15.4)
FLUAV RNA RESP QL NAA+PROBE: NOT DETECTED
FLUBV RNA RESP QL NAA+PROBE: NOT DETECTED
GEN 5 1HR TROPONIN T REFLEX: 20 NG/L
GLOBULIN UR ELPH-MCNC: 3.6 GM/DL
GLUCOSE SERPL-MCNC: 157 MG/DL (ref 65–99)
GLUCOSE SERPL-MCNC: 194 MG/DL (ref 65–99)
GLUCOSE UR STRIP-MCNC: ABNORMAL MG/DL
HCO3 BLDA-SCNC: 20.2 MMOL/L (ref 20–26)
HCT VFR BLD AUTO: 34.1 % (ref 34–46.6)
HCT VFR BLD AUTO: 44 % (ref 34–46.6)
HCT VFR BLD CALC: 41.3 % (ref 38–51)
HGB BLD-MCNC: 10.3 G/DL (ref 12–15.9)
HGB BLD-MCNC: 13.7 G/DL (ref 12–15.9)
HGB BLDA-MCNC: 13.5 G/DL (ref 13.5–17.5)
HGB UR QL STRIP.AUTO: NEGATIVE
HOLD SPECIMEN: NORMAL
HOLD SPECIMEN: NORMAL
INHALED O2 CONCENTRATION: 21 %
INR PPP: 1.12 (ref 0.9–1.1)
KETONES UR QL STRIP: NEGATIVE
LEUKOCYTE ESTERASE UR QL STRIP.AUTO: NEGATIVE
LIPASE SERPL-CCNC: 16 U/L (ref 13–60)
LYMPHOCYTES # BLD MANUAL: 2 10*3/MM3 (ref 0.7–3.1)
LYMPHOCYTES NFR BLD MANUAL: 1 % (ref 5–12)
Lab: ABNORMAL
MAGNESIUM SERPL-MCNC: 1.9 MG/DL (ref 1.6–2.4)
MCH RBC QN AUTO: 26.8 PG (ref 26.6–33)
MCH RBC QN AUTO: 26.8 PG (ref 26.6–33)
MCHC RBC AUTO-ENTMCNC: 30.2 G/DL (ref 31.5–35.7)
MCHC RBC AUTO-ENTMCNC: 31.1 G/DL (ref 31.5–35.7)
MCV RBC AUTO: 86.1 FL (ref 79–97)
MCV RBC AUTO: 88.6 FL (ref 79–97)
METAMYELOCYTES NFR BLD MANUAL: 1 % (ref 0–0)
METHGB BLD QL: 0.1 % (ref 0–3)
MODALITY: ABNORMAL
MONOCYTES # BLD: 0.33 10*3/MM3 (ref 0.1–0.9)
NEUTROPHILS # BLD AUTO: 30.71 10*3/MM3 (ref 1.7–7)
NEUTROPHILS NFR BLD MANUAL: 86 % (ref 42.7–76)
NEUTS BAND NFR BLD MANUAL: 6 % (ref 0–5)
NITRITE UR QL STRIP: NEGATIVE
OXYHGB MFR BLDV: 86 % (ref 94–99)
PCO2 BLDA: 38.6 MM HG (ref 35–45)
PCO2 TEMP ADJ BLD: ABNORMAL MM[HG]
PH BLDA: 7.33 PH UNITS (ref 7.35–7.45)
PH UR STRIP.AUTO: 5.5 [PH] (ref 5–8)
PH, TEMP CORRECTED: ABNORMAL
PLAT MORPH BLD: NORMAL
PLATELET # BLD AUTO: 169 10*3/MM3 (ref 140–450)
PLATELET # BLD AUTO: 253 10*3/MM3 (ref 140–450)
PMV BLD AUTO: 11 FL (ref 6–12)
PMV BLD AUTO: 11 FL (ref 6–12)
PO2 BLDA: 57.3 MM HG (ref 83–108)
PO2 TEMP ADJ BLD: ABNORMAL MM[HG]
POTASSIUM SERPL-SCNC: 4.3 MMOL/L (ref 3.5–5.2)
POTASSIUM SERPL-SCNC: 4.3 MMOL/L (ref 3.5–5.2)
PROCALCITONIN SERPL-MCNC: 21.54 NG/ML (ref 0–0.25)
PROT SERPL-MCNC: 7.9 G/DL (ref 6–8.5)
PROT UR QL STRIP: NEGATIVE
PROTHROMBIN TIME: 14.5 SECONDS (ref 12.1–14.7)
QT INTERVAL: 416 MS
QTC INTERVAL: 461 MS
RBC # BLD AUTO: 3.85 10*6/MM3 (ref 3.77–5.28)
RBC # BLD AUTO: 5.11 10*6/MM3 (ref 3.77–5.28)
RBC MORPH BLD: NORMAL
SAO2 % BLDCOA: 87.4 % (ref 94–99)
SARS-COV-2 RNA RESP QL NAA+PROBE: NOT DETECTED
SODIUM SERPL-SCNC: 140 MMOL/L (ref 136–145)
SODIUM SERPL-SCNC: 141 MMOL/L (ref 136–145)
SP GR UR STRIP: 1.01 (ref 1–1.03)
TROPONIN T NUMERIC DELTA: -7 NG/L
TROPONIN T SERPL HS-MCNC: 27 NG/L
UROBILINOGEN UR QL STRIP: ABNORMAL
VANCOMYCIN SERPL-MCNC: 27.6 MCG/ML (ref 5–40)
VARIANT LYMPHS NFR BLD MANUAL: 6 % (ref 19.6–45.3)
VENTILATOR MODE: ABNORMAL
WBC NRBC COR # BLD AUTO: 26.67 10*3/MM3 (ref 3.4–10.8)
WBC NRBC COR # BLD AUTO: 33.38 10*3/MM3 (ref 3.4–10.8)
WHOLE BLOOD HOLD COAG: NORMAL
WHOLE BLOOD HOLD SPECIMEN: NORMAL

## 2024-11-23 PROCEDURE — 82375 ASSAY CARBOXYHB QUANT: CPT

## 2024-11-23 PROCEDURE — 25010000002 VANCOMYCIN 5 G RECONSTITUTED SOLUTION: Performed by: FAMILY MEDICINE

## 2024-11-23 PROCEDURE — 71045 X-RAY EXAM CHEST 1 VIEW: CPT

## 2024-11-23 PROCEDURE — 85007 BL SMEAR W/DIFF WBC COUNT: CPT | Performed by: FAMILY MEDICINE

## 2024-11-23 PROCEDURE — 83605 ASSAY OF LACTIC ACID: CPT | Performed by: FAMILY MEDICINE

## 2024-11-23 PROCEDURE — 87077 CULTURE AEROBIC IDENTIFY: CPT | Performed by: FAMILY MEDICINE

## 2024-11-23 PROCEDURE — 83735 ASSAY OF MAGNESIUM: CPT | Performed by: FAMILY MEDICINE

## 2024-11-23 PROCEDURE — 84145 PROCALCITONIN (PCT): CPT | Performed by: FAMILY MEDICINE

## 2024-11-23 PROCEDURE — 86140 C-REACTIVE PROTEIN: CPT | Performed by: FAMILY MEDICINE

## 2024-11-23 PROCEDURE — 83050 HGB METHEMOGLOBIN QUAN: CPT

## 2024-11-23 PROCEDURE — P9047 ALBUMIN (HUMAN), 25%, 50ML: HCPCS | Performed by: HOSPITALIST

## 2024-11-23 PROCEDURE — 36415 COLL VENOUS BLD VENIPUNCTURE: CPT | Performed by: FAMILY MEDICINE

## 2024-11-23 PROCEDURE — 87154 CUL TYP ID BLD PTHGN 6+ TRGT: CPT | Performed by: FAMILY MEDICINE

## 2024-11-23 PROCEDURE — 25810000003 SODIUM CHLORIDE 0.9 % SOLUTION: Performed by: FAMILY MEDICINE

## 2024-11-23 PROCEDURE — 82330 ASSAY OF CALCIUM: CPT | Performed by: FAMILY MEDICINE

## 2024-11-23 PROCEDURE — 25810000003 SODIUM CHLORIDE 0.9 % SOLUTION: Performed by: HOSPITALIST

## 2024-11-23 PROCEDURE — 71250 CT THORAX DX C-: CPT | Performed by: RADIOLOGY

## 2024-11-23 PROCEDURE — 25010000002 CEFEPIME PER 500 MG: Performed by: FAMILY MEDICINE

## 2024-11-23 PROCEDURE — 85025 COMPLETE CBC W/AUTO DIFF WBC: CPT | Performed by: FAMILY MEDICINE

## 2024-11-23 PROCEDURE — 99285 EMERGENCY DEPT VISIT HI MDM: CPT

## 2024-11-23 PROCEDURE — 71250 CT THORAX DX C-: CPT

## 2024-11-23 PROCEDURE — 80202 ASSAY OF VANCOMYCIN: CPT | Performed by: STUDENT IN AN ORGANIZED HEALTH CARE EDUCATION/TRAINING PROGRAM

## 2024-11-23 PROCEDURE — 87636 SARSCOV2 & INF A&B AMP PRB: CPT | Performed by: FAMILY MEDICINE

## 2024-11-23 PROCEDURE — 99223 1ST HOSP IP/OBS HIGH 75: CPT | Performed by: STUDENT IN AN ORGANIZED HEALTH CARE EDUCATION/TRAINING PROGRAM

## 2024-11-23 PROCEDURE — 84484 ASSAY OF TROPONIN QUANT: CPT | Performed by: FAMILY MEDICINE

## 2024-11-23 PROCEDURE — 36600 WITHDRAWAL OF ARTERIAL BLOOD: CPT

## 2024-11-23 PROCEDURE — 83690 ASSAY OF LIPASE: CPT | Performed by: FAMILY MEDICINE

## 2024-11-23 PROCEDURE — P9612 CATHETERIZE FOR URINE SPEC: HCPCS

## 2024-11-23 PROCEDURE — 82805 BLOOD GASES W/O2 SATURATION: CPT

## 2024-11-23 PROCEDURE — 74176 CT ABD & PELVIS W/O CONTRAST: CPT

## 2024-11-23 PROCEDURE — 25010000002 ONDANSETRON PER 1 MG: Performed by: FAMILY MEDICINE

## 2024-11-23 PROCEDURE — 25010000002 ALBUMIN HUMAN 25% PER 50 ML: Performed by: HOSPITALIST

## 2024-11-23 PROCEDURE — 93010 ELECTROCARDIOGRAM REPORT: CPT | Performed by: SPECIALIST

## 2024-11-23 PROCEDURE — 85610 PROTHROMBIN TIME: CPT | Performed by: FAMILY MEDICINE

## 2024-11-23 PROCEDURE — 87186 SC STD MICRODIL/AGAR DIL: CPT | Performed by: FAMILY MEDICINE

## 2024-11-23 PROCEDURE — 93005 ELECTROCARDIOGRAM TRACING: CPT | Performed by: FAMILY MEDICINE

## 2024-11-23 PROCEDURE — 74176 CT ABD & PELVIS W/O CONTRAST: CPT | Performed by: RADIOLOGY

## 2024-11-23 PROCEDURE — 81003 URINALYSIS AUTO W/O SCOPE: CPT | Performed by: FAMILY MEDICINE

## 2024-11-23 PROCEDURE — 85027 COMPLETE CBC AUTOMATED: CPT | Performed by: STUDENT IN AN ORGANIZED HEALTH CARE EDUCATION/TRAINING PROGRAM

## 2024-11-23 PROCEDURE — 71045 X-RAY EXAM CHEST 1 VIEW: CPT | Performed by: RADIOLOGY

## 2024-11-23 PROCEDURE — 87040 BLOOD CULTURE FOR BACTERIA: CPT | Performed by: FAMILY MEDICINE

## 2024-11-23 PROCEDURE — 80053 COMPREHEN METABOLIC PANEL: CPT | Performed by: FAMILY MEDICINE

## 2024-11-23 RX ORDER — BISACODYL 5 MG/1
5 TABLET, DELAYED RELEASE ORAL DAILY PRN
Status: DISCONTINUED | OUTPATIENT
Start: 2024-11-23 | End: 2024-11-27 | Stop reason: HOSPADM

## 2024-11-23 RX ORDER — BISACODYL 10 MG
10 SUPPOSITORY, RECTAL RECTAL DAILY PRN
Status: DISCONTINUED | OUTPATIENT
Start: 2024-11-23 | End: 2024-11-27 | Stop reason: HOSPADM

## 2024-11-23 RX ORDER — ONDANSETRON 2 MG/ML
4 INJECTION INTRAMUSCULAR; INTRAVENOUS ONCE
Status: COMPLETED | OUTPATIENT
Start: 2024-11-23 | End: 2024-11-23

## 2024-11-23 RX ORDER — SODIUM CHLORIDE 0.9 % (FLUSH) 0.9 %
10 SYRINGE (ML) INJECTION AS NEEDED
Status: DISCONTINUED | OUTPATIENT
Start: 2024-11-23 | End: 2024-11-27 | Stop reason: HOSPADM

## 2024-11-23 RX ORDER — SODIUM CHLORIDE 9 MG/ML
100 INJECTION, SOLUTION INTRAVENOUS CONTINUOUS
Status: ACTIVE | OUTPATIENT
Start: 2024-11-23 | End: 2024-11-24

## 2024-11-23 RX ORDER — SODIUM CHLORIDE 9 MG/ML
40 INJECTION, SOLUTION INTRAVENOUS AS NEEDED
Status: DISCONTINUED | OUTPATIENT
Start: 2024-11-23 | End: 2024-11-27 | Stop reason: HOSPADM

## 2024-11-23 RX ORDER — ACETAMINOPHEN 500 MG
1000 TABLET ORAL EVERY 8 HOURS PRN
Status: DISCONTINUED | OUTPATIENT
Start: 2024-11-23 | End: 2024-11-27 | Stop reason: HOSPADM

## 2024-11-23 RX ORDER — POLYETHYLENE GLYCOL 3350 17 G/17G
17 POWDER, FOR SOLUTION ORAL DAILY PRN
Status: DISCONTINUED | OUTPATIENT
Start: 2024-11-23 | End: 2024-11-27 | Stop reason: HOSPADM

## 2024-11-23 RX ORDER — SODIUM CHLORIDE 0.9 % (FLUSH) 0.9 %
10 SYRINGE (ML) INJECTION EVERY 12 HOURS SCHEDULED
Status: DISCONTINUED | OUTPATIENT
Start: 2024-11-23 | End: 2024-11-27 | Stop reason: HOSPADM

## 2024-11-23 RX ORDER — LISINOPRIL 10 MG/1
10 TABLET ORAL DAILY
Status: CANCELLED | OUTPATIENT
Start: 2024-11-24

## 2024-11-23 RX ORDER — AMOXICILLIN 250 MG
2 CAPSULE ORAL 2 TIMES DAILY PRN
Status: DISCONTINUED | OUTPATIENT
Start: 2024-11-23 | End: 2024-11-27 | Stop reason: HOSPADM

## 2024-11-23 RX ORDER — VANCOMYCIN 2 GRAM/500 ML IN 0.9 % SODIUM CHLORIDE INTRAVENOUS
20 ONCE
Status: COMPLETED | OUTPATIENT
Start: 2024-11-23 | End: 2024-11-23

## 2024-11-23 RX ORDER — NITROGLYCERIN 0.4 MG/1
0.4 TABLET SUBLINGUAL
Status: DISCONTINUED | OUTPATIENT
Start: 2024-11-23 | End: 2024-11-27 | Stop reason: HOSPADM

## 2024-11-23 RX ORDER — ALBUMIN (HUMAN) 12.5 G/50ML
25 SOLUTION INTRAVENOUS
Status: COMPLETED | OUTPATIENT
Start: 2024-11-23 | End: 2024-11-23

## 2024-11-23 RX ADMIN — ALBUMIN (HUMAN) 25 G: 0.25 INJECTION, SOLUTION INTRAVENOUS at 22:46

## 2024-11-23 RX ADMIN — ALBUMIN (HUMAN) 25 G: 0.25 INJECTION, SOLUTION INTRAVENOUS at 22:05

## 2024-11-23 RX ADMIN — ACETAMINOPHEN 1000 MG: 500 TABLET ORAL at 20:20

## 2024-11-23 RX ADMIN — VANCOMYCIN HYDROCHLORIDE 2000 MG: 5 INJECTION, POWDER, LYOPHILIZED, FOR SOLUTION INTRAVENOUS at 15:38

## 2024-11-23 RX ADMIN — SODIUM CHLORIDE 100 ML/HR: 9 INJECTION, SOLUTION INTRAVENOUS at 21:34

## 2024-11-23 RX ADMIN — CEFEPIME 2000 MG: 2 INJECTION, POWDER, FOR SOLUTION INTRAVENOUS at 15:01

## 2024-11-23 RX ADMIN — ONDANSETRON 4 MG: 2 INJECTION INTRAMUSCULAR; INTRAVENOUS at 15:00

## 2024-11-23 RX ADMIN — Medication 10 ML: at 20:21

## 2024-11-23 RX ADMIN — SODIUM CHLORIDE 2859 ML: 9 INJECTION, SOLUTION INTRAVENOUS at 14:17

## 2024-11-23 NOTE — CASE MANAGEMENT/SOCIAL WORK
Discharge Planning Assessment   [place]     Patient Name: Mayra Hays  MRN: [id number]  Today's Date: 11/23/2024    Admit Date: 11/23/2024    Plan: Pt lives at home alone and plans to return home at discharge family will provide transportation. Pcp is Niki Antony, she uses [facility] and has [place] [employer] and [employer]. Pt does not have home health. Pt uses o2 2 liters and nebs from unknown provider but states it is in [place]. Pt's address verified on demographics.   Discharge Needs Assessment       Row Name 11/23/24 1722       Living Environment    People in Home alone    Current Living Arrangements home    Potentially Unsafe Housing Conditions none    Primary Care Provided by self    Family Caregiver if Needed child(eligio), adult    Quality of Family Relationships helpful;involved;supportive    Able to Return to Prior Arrangements yes       Resource/Environmental Concerns    Resource/Environmental Concerns none    Transportation Concerns none       Transition Planning    Patient/Family Anticipates Transition to home with family    Patient/Family Anticipated Services at Transition none    Transportation Anticipated family or friend will provide       Discharge Needs Assessment    Readmission Within the Last 30 Days no previous admission in last 30 days    Equipment Currently Used at Home nebulizer;oxygen    Concerns to be Addressed no discharge needs identified;denies needs/concerns at this time    Anticipated Changes Related to Illness none    Equipment Needed After Discharge none                   Discharge Plan       Row Name 11/23/24 5683       Plan    Plan Pt lives at home alone and plans to return home at discharge family will provide transportation. Pcp is Niki Antony, she uses [facility] and has [place] [employer] and [employer]. Pt does not have home health. Pt uses o2 2 liters and nebs from unknown provider but states it is in [place]. Pt's address  verified on demographics.    Patient/Family in Agreement with Plan yes                  Continued Care and Services - Admitted Since 11/23/2024    No active coordination exists for this encounter.       Selected Continued Care - Episodes Includes continued care and service providers with selected services from the active episodes listed below      COPD Episode start date: 7/8/2024   There are no active outsourced providers for this episode.                 Expected Discharge Date and Time       Expected Discharge Date Expected Discharge Time    Nov 26, 2024            Demographic Summary       Row Name 11/23/24 0902       General Information    Admission Type inpatient    Arrived From home    Referral Source emergency department    Reason for Consult discharge planning                  Margie Laguerre RN

## 2024-11-23 NOTE — ED PROVIDER NOTES
"Subjective   History of Present Illness  79-year-old female with a history of chronic heart failure, chronic anticoagulation on Eliquis, diabetes, history of non-small cell lung cancer stage III currently on chemotherapy with last dose on Thursday presents the emergency room with complaints of generalized weakness.  Patient reportedly had chemotherapy on Thursday the following day she started having nausea vomiting.  Patient reports a cough as well.  Per family patient recent was told she may be trying to develop \"pneumonia.\".  Patient denies fever or chills.  Patient does report feeling weak all over and fatigue.  No known sick contacts.  Due to ongoing symptoms patient came to emergency room for evaluation.    Weakness - Generalized  Severity:  Moderate  Duration:  1 day  Timing:  Constant  Relieved by:  Nothing  Worsened by:  Nothing  Associated symptoms: cough and shortness of breath    Associated symptoms: no abdominal pain, no chest pain, no diarrhea, no fever, no frequency and no headaches        Review of Systems   Constitutional:  Negative for fever.   Respiratory:  Positive for cough and shortness of breath.    Cardiovascular:  Negative for chest pain.   Gastrointestinal:  Negative for abdominal pain and diarrhea.   Genitourinary:  Negative for frequency.   Neurological:  Negative for headaches.   All other systems reviewed and are negative.      Past Medical History:   Diagnosis Date    Arthritis     CHF (congestive heart failure)     Chronic anticoagulation     Eliquis    Chronic kidney disease     stage II/IIIa    Collapsed lung     COPD (chronic obstructive pulmonary disease)     Diabetes mellitus     TYPE 2    Elevated cholesterol     GERD (gastroesophageal reflux disease)     Hyperlipidemia     Hypertension     Non-small cell lung cancer RUL     Sleep apnea     non compliant with CPAP    Stroke 2019    Wears eyeglasses        Allergies   Allergen Reactions    Paclitaxel Anaphylaxis       Past " Surgical History:   Procedure Laterality Date    BRONCHOSCOPY Bilateral 2023    Procedure: BRONCHOSCOPY WITH ENDOBRONCHIAL ULTRASOUND;  Surgeon: Abdulkadir Peter MD;  Location:  [facility];  Service: Pulmonary;  Laterality: Bilateral;    BRONCHOSCOPY WITH ION ROBOTIC ASSIST N/A 2023    Procedure: BRONCHOSCOPY WITH ION ROBOT AND EBUS;  Surgeon: John Clemens MD;  Location:  [facility];  Service: Robotics - Pulmonary;  Laterality: N/A;  Ion cath #6 - 0032,  #6 - 0030, cath guide # 0077. EBUS scope removed with balloon intact.    CARDIAC CATHETERIZATION N/A 2017    Procedure: Left Heart Cath;  Surgeon: Jatinder Matias MD;  Location:  [facility];  Service:     CARDIAC CATHETERIZATION N/A 2021    Procedure: Left Heart Cath;  Surgeon: Tolu Steinberg MD;  [facility]:  [facility];  Service: Cardiology;  Laterality: N/A;    COLONOSCOPY      ENDOSCOPY      GALLBLADDER SURGERY      PORTACATH PLACEMENT N/A 2023    Procedure: INSERTION OF PORTACATH;  Surgeon: Petr Velásquez MD;  Location:  [facility];  Service: General;  Laterality: N/A;    VENTRAL HERNIA REPAIR N/A 2020    Procedure: VENTRAL HERNIA REPAIR LAPAROSCOPIC WITH DAVINCI ROBOT;  Surgeon: Petr Velásquez MD;  Location:  [facility];  Service: [place];  Laterality: N/A;       Family History   Problem Relation Age of Onset    Heart disease Mother         Rhianna sophia    Heart disease Father     Heart attack Father     Heart failure Father        Social History     Socioeconomic History    Marital status: Single    Number of children: 6   Tobacco Use    Smoking status: Former     Current packs/day: 0.00     Average packs/day: 3.0 packs/day for 16.7 years (50.2 ttl pk-yrs)     Types: Cigarettes     Start date: 4/3/1998     Quit date: 2015     Years since quittin.9     Passive exposure: Past    Smokeless tobacco: Never   Vaping Use    Vaping status: Never Used   Substance and  Sexual Activity    Alcohol use: No    Drug use: No    Sexual activity: Never           Objective   Physical Exam  Vitals and nursing note reviewed.   Constitutional:       Appearance: She is ill-appearing.   HENT:      Head: Normocephalic and atraumatic.      Comments: Dry mucous membranes.     Mouth/Throat:      Mouth: Mucous membranes are dry.   Eyes:      Extraocular Movements: Extraocular movements intact.      Pupils: Pupils are equal, round, and reactive to light.   Cardiovascular:      Rate and Rhythm: Normal rate and regular rhythm.   Pulmonary:      Breath sounds: Rhonchi present.      Comments: Left lower lobe.  No accessory muscle use.  Abdominal:      General: Bowel sounds are normal.      Palpations: Abdomen is soft.   Musculoskeletal:      Right lower leg: No edema.      Left lower leg: No edema.   Lymphadenopathy:      Cervical: No cervical adenopathy.   Skin:     Comments: Petechiae noted bilateral extremities.   Neurological:      Mental Status: She is alert.      Cranial Nerves: No cranial nerve deficit.      Sensory: No sensory deficit.   Psychiatric:         Mood and Affect: Mood normal.         Procedures           ED Course  ED Course as of 11/23/24 1707   Sat Nov 23, 2024   1406 Patient is concerning for sepsis will initiate septic workup.  Will initiate IV fluids will give a liter of IV fluids in light of her history of chronic heart failure. [BB]   1407 Have reviewed patient's echo from November 2023 where ejection fraction was 51 to 55%. [BB]   1410 Will initiate IV cefepime for empiric coverage. [BB]   1432 Patient with elevated white blood cell count 33,000 IV antibiotics have been initiated.  IV fluids are initiated as well. [BB]   1440 INR 1.13. [BB]   1447 EKG normal sinus rhythm at rate 74 parable 160  QTc 461 no ST elevation. [BB]   1448 Patient's ABG shows a metabolic acidosis along with hypoxemia with pO2 of 57.3.  Supplemental oxygen has been ordered. [BB]   1452 Chest  "x-ray shows right lower lobe infiltrate. [BB]   1455 COVID flu negative [BB]   1455 CRP 18.67 lipase unremarkable. [BB]   1500 Patient elevated lactic acid 4.5 procalcitonin 21.54.  Will initiate IV vancomycin as well. [BB]   1501 Patient acute kidney injury as well. [BB]   1501 Have contacted Dr. Potter and made aware of status [BB]   1542 XR Chest 1 View    Result Date: 11/23/2024  Subsegmental atelectasis/airspace disease in the lung bases.  This report was finalized on 11/23/2024 3:33 PM by Alex Pallas, DO.       [BB]   1610 CT scan of chest is concerning for right upper lobe malignancy however right lower lobe appears to show finding concerning for pneumonia.  Broad-spectrum antibiotics have been initiated already. [BB]   1651 Have contacted hospitalist awaiting callback [BB]   1702 Have discussed case with hospitalist who is agreeable to admit.  Patient's blood pressures were remained on the soft side have discussed with using port accessed with Dr. Webster with heme-onc who is okay with port being used for pressor support. [BB]      ED Course User Index  [BB] Melvin Larson MD                                                       Medical Decision Making  79-year-old female with a history of chronic heart failure, chronic anticoagulation on Eliquis, diabetes, history of non-small cell lung cancer stage III currently on chemotherapy with last dose on Thursday presents the emergency room with complaints of generalized weakness.  Patient reportedly had chemotherapy on Thursday the following day she started having nausea vomiting.  Patient reports a cough as well.  Per family patient recent was told she may be trying to develop \"pneumonia.\".  Patient denies fever or chills.  Patient does report feeling weak all over and fatigue.  No known sick contacts.  Due to ongoing symptoms patient came to emergency room for evaluation.      MDM:    Escalation of care including admission/observation considered    - " Discussions of management with other providers:  None and Hospitalist    - Discussed/reviewed with Radiology regarding test interpretation    - Independent interpretation: Labs, CT, CXR    - Additional patient history obtained from: None and Parent    - Review of external non-ED record (if available):  Prior Inpt record, Office record, Outpt record, Prior Outpt labs, PCP record, Outside ED record, Other    - Chronic conditions affecting care: See HPI and medical Hx.    - Social Determinants of health significantly affecting care:  None        Medical Decision Making Discussion:        The patient has been given very strict return precautions to return to the emergency department should there be any acute change or worsening of their condition.  I have explained my findings and the patient has expressed understanding to me.  I explained that the work-up performed in the ED has been based on the specific complaint and concern, as the nature of emergency medicine is complaint driven and they understand that new symptoms may arise.  I have told them that, should there be any new symptoms, worsening or changing symptoms, a new work-up may be indicated that they are encouraged to return to the emergency department or promptly contact their primary care physician. We have employed a shared decision-making process as the discussion of their disposition.  The patient has been educated as to the nature of the visit, the tests and work-up performed and the findings from today's visit. At this time, there does not appear to be any acute emergent process that necessitates admission to the hospital, however, the patient understands that this can change unexpectedly. At this time, the patient is stable for discharge home and agrees to follow-up with her primary care physician in the next 24 to 48 hours or earlier should they be able to obtain an appointment.    The patient was counseled regarding diagnostic results and treatment  plan and patient has indicated understanding of these instructions.     Problems Addressed:  Multifocal pneumonia: complicated acute illness or injury  Sepsis with acute renal failure, due to unspecified organism, unspecified acute renal failure type, unspecified whether septic shock present: complicated acute illness or injury    Amount and/or Complexity of Data Reviewed  External Data Reviewed: labs and notes.  Labs: ordered.  Radiology: ordered.  ECG/medicine tests: ordered.    Risk  Prescription drug management.  Decision regarding hospitalization.        Final diagnoses:   Sepsis with acute renal failure, due to unspecified organism, unspecified acute renal failure type, unspecified whether septic shock present   Multifocal pneumonia       ED Disposition  ED Disposition       ED Disposition   Decision to Admit    Condition   --    Comment   --               No follow-up provider specified.       Medication List      No changes were made to your prescriptions during this visit.            Melvin Larson MD  11/23/24 5542

## 2024-11-24 LAB
GLUCOSE BLDC GLUCOMTR-MCNC: 103 MG/DL (ref 70–130)
L PNEUMO1 AG UR QL IA: NEGATIVE
MRSA DNA SPEC QL NAA+PROBE: NORMAL
QT INTERVAL: 384 MS
QTC INTERVAL: 448 MS

## 2024-11-24 PROCEDURE — 93010 ELECTROCARDIOGRAM REPORT: CPT | Performed by: SPECIALIST

## 2024-11-24 PROCEDURE — 87641 MR-STAPH DNA AMP PROBE: CPT | Performed by: STUDENT IN AN ORGANIZED HEALTH CARE EDUCATION/TRAINING PROGRAM

## 2024-11-24 PROCEDURE — 25810000003 SODIUM CHLORIDE 0.9 % SOLUTION: Performed by: STUDENT IN AN ORGANIZED HEALTH CARE EDUCATION/TRAINING PROGRAM

## 2024-11-24 PROCEDURE — 25010000002 CEFEPIME PER 500 MG: Performed by: STUDENT IN AN ORGANIZED HEALTH CARE EDUCATION/TRAINING PROGRAM

## 2024-11-24 PROCEDURE — 94664 DEMO&/EVAL PT USE INHALER: CPT

## 2024-11-24 PROCEDURE — 25010000002 METHYLPREDNISOLONE PER 40 MG: Performed by: STUDENT IN AN ORGANIZED HEALTH CARE EDUCATION/TRAINING PROGRAM

## 2024-11-24 PROCEDURE — 87070 CULTURE OTHR SPECIMN AEROBIC: CPT | Performed by: STUDENT IN AN ORGANIZED HEALTH CARE EDUCATION/TRAINING PROGRAM

## 2024-11-24 PROCEDURE — 94640 AIRWAY INHALATION TREATMENT: CPT

## 2024-11-24 PROCEDURE — 25010000002 ONDANSETRON PER 1 MG: Performed by: STUDENT IN AN ORGANIZED HEALTH CARE EDUCATION/TRAINING PROGRAM

## 2024-11-24 PROCEDURE — 99232 SBSQ HOSP IP/OBS MODERATE 35: CPT | Performed by: STUDENT IN AN ORGANIZED HEALTH CARE EDUCATION/TRAINING PROGRAM

## 2024-11-24 PROCEDURE — 87205 SMEAR GRAM STAIN: CPT | Performed by: STUDENT IN AN ORGANIZED HEALTH CARE EDUCATION/TRAINING PROGRAM

## 2024-11-24 PROCEDURE — 25810000003 SODIUM CHLORIDE 0.9 % SOLUTION: Performed by: HOSPITALIST

## 2024-11-24 PROCEDURE — 87449 NOS EACH ORGANISM AG IA: CPT | Performed by: STUDENT IN AN ORGANIZED HEALTH CARE EDUCATION/TRAINING PROGRAM

## 2024-11-24 PROCEDURE — 87899 AGENT NOS ASSAY W/OPTIC: CPT | Performed by: STUDENT IN AN ORGANIZED HEALTH CARE EDUCATION/TRAINING PROGRAM

## 2024-11-24 PROCEDURE — 93005 ELECTROCARDIOGRAM TRACING: CPT | Performed by: STUDENT IN AN ORGANIZED HEALTH CARE EDUCATION/TRAINING PROGRAM

## 2024-11-24 PROCEDURE — 82948 REAGENT STRIP/BLOOD GLUCOSE: CPT

## 2024-11-24 RX ORDER — ONDANSETRON 2 MG/ML
4 INJECTION INTRAMUSCULAR; INTRAVENOUS EVERY 6 HOURS PRN
Status: DISCONTINUED | OUTPATIENT
Start: 2024-11-24 | End: 2024-11-27 | Stop reason: HOSPADM

## 2024-11-24 RX ORDER — METHYLPREDNISOLONE SODIUM SUCCINATE 40 MG/ML
40 INJECTION, POWDER, LYOPHILIZED, FOR SOLUTION INTRAMUSCULAR; INTRAVENOUS EVERY 12 HOURS
Status: DISCONTINUED | OUTPATIENT
Start: 2024-11-24 | End: 2024-11-27 | Stop reason: HOSPADM

## 2024-11-24 RX ORDER — ATORVASTATIN CALCIUM 20 MG/1
20 TABLET, FILM COATED ORAL DAILY
Status: DISCONTINUED | OUTPATIENT
Start: 2024-11-24 | End: 2024-11-27 | Stop reason: HOSPADM

## 2024-11-24 RX ORDER — PANTOPRAZOLE SODIUM 40 MG/1
40 TABLET, DELAYED RELEASE ORAL
Status: DISCONTINUED | OUTPATIENT
Start: 2024-11-25 | End: 2024-11-27 | Stop reason: HOSPADM

## 2024-11-24 RX ORDER — ONDANSETRON 4 MG/1
4 TABLET, ORALLY DISINTEGRATING ORAL EVERY 6 HOURS PRN
Status: DISCONTINUED | OUTPATIENT
Start: 2024-11-24 | End: 2024-11-27 | Stop reason: HOSPADM

## 2024-11-24 RX ORDER — SODIUM CHLORIDE 9 MG/ML
40 INJECTION, SOLUTION INTRAVENOUS AS NEEDED
Status: DISCONTINUED | OUTPATIENT
Start: 2024-11-24 | End: 2024-11-27 | Stop reason: HOSPADM

## 2024-11-24 RX ORDER — NOREPINEPHRINE BITARTRATE 0.03 MG/ML
.02-.3 INJECTION, SOLUTION INTRAVENOUS
Status: DISCONTINUED | OUTPATIENT
Start: 2024-11-24 | End: 2024-11-26

## 2024-11-24 RX ORDER — BUDESONIDE AND FORMOTEROL FUMARATE DIHYDRATE 160; 4.5 UG/1; UG/1
2 AEROSOL RESPIRATORY (INHALATION)
Status: DISCONTINUED | OUTPATIENT
Start: 2024-11-24 | End: 2024-11-27 | Stop reason: HOSPADM

## 2024-11-24 RX ORDER — ALBUTEROL SULFATE 0.83 MG/ML
2.5 SOLUTION RESPIRATORY (INHALATION) EVERY 4 HOURS PRN
Status: DISCONTINUED | OUTPATIENT
Start: 2024-11-24 | End: 2024-11-27 | Stop reason: HOSPADM

## 2024-11-24 RX ORDER — SODIUM CHLORIDE 0.9 % (FLUSH) 0.9 %
10 SYRINGE (ML) INJECTION AS NEEDED
Status: DISCONTINUED | OUTPATIENT
Start: 2024-11-24 | End: 2024-11-27 | Stop reason: HOSPADM

## 2024-11-24 RX ORDER — SODIUM CHLORIDE 0.9 % (FLUSH) 0.9 %
10 SYRINGE (ML) INJECTION EVERY 12 HOURS SCHEDULED
Status: DISCONTINUED | OUTPATIENT
Start: 2024-11-24 | End: 2024-11-27 | Stop reason: HOSPADM

## 2024-11-24 RX ORDER — HYDROXYZINE HYDROCHLORIDE 25 MG/1
25 TABLET, FILM COATED ORAL NIGHTLY
Status: DISCONTINUED | OUTPATIENT
Start: 2024-11-24 | End: 2024-11-27 | Stop reason: HOSPADM

## 2024-11-24 RX ORDER — SODIUM CHLORIDE 9 MG/ML
75 INJECTION, SOLUTION INTRAVENOUS CONTINUOUS
Status: ACTIVE | OUTPATIENT
Start: 2024-11-24 | End: 2024-11-25

## 2024-11-24 RX ADMIN — ATORVASTATIN CALCIUM 20 MG: 20 TABLET, FILM COATED ORAL at 15:16

## 2024-11-24 RX ADMIN — BUDESONIDE AND FORMOTEROL FUMARATE DIHYDRATE 2 PUFF: 160; 4.5 AEROSOL RESPIRATORY (INHALATION) at 19:14

## 2024-11-24 RX ADMIN — NOREPINEPHRINE BITARTRATE 0.02 MCG/KG/MIN: 0.03 INJECTION, SOLUTION INTRAVENOUS at 00:13

## 2024-11-24 RX ADMIN — SODIUM CHLORIDE 100 ML/HR: 9 INJECTION, SOLUTION INTRAVENOUS at 06:51

## 2024-11-24 RX ADMIN — CEFEPIME 2000 MG: 2 INJECTION, POWDER, FOR SOLUTION INTRAVENOUS at 14:48

## 2024-11-24 RX ADMIN — Medication 10 ML: at 20:59

## 2024-11-24 RX ADMIN — ONDANSETRON 4 MG: 2 INJECTION INTRAMUSCULAR; INTRAVENOUS at 11:14

## 2024-11-24 RX ADMIN — APIXABAN 5 MG: 5 TABLET, FILM COATED ORAL at 21:00

## 2024-11-24 RX ADMIN — HYDROXYZINE HYDROCHLORIDE 25 MG: 25 TABLET ORAL at 21:00

## 2024-11-24 RX ADMIN — SODIUM CHLORIDE 75 ML/HR: 9 INJECTION, SOLUTION INTRAVENOUS at 15:15

## 2024-11-24 RX ADMIN — IPRATROPIUM BROMIDE 0.5 MG: 0.5 SOLUTION RESPIRATORY (INHALATION) at 19:14

## 2024-11-24 RX ADMIN — Medication 10 ML: at 21:00

## 2024-11-24 RX ADMIN — METHYLPREDNISOLONE SODIUM SUCCINATE 40 MG: 40 INJECTION, POWDER, FOR SOLUTION INTRAMUSCULAR; INTRAVENOUS at 15:16

## 2024-11-24 RX ADMIN — Medication 10 ML: at 09:12

## 2024-11-24 NOTE — PLAN OF CARE
Goal Outcome Evaluation:  Plan of Care Reviewed With: patient        Progress: improving  Outcome Evaluation: Patient welcomed and greeted to PCU by staff. Family at bedside. Pt oriented to room and use of call light. Alert and calm. Bed alarm set. Call light within reach.

## 2024-11-24 NOTE — PROGRESS NOTES
Pharmacokinetics Service Note:    Mayra Hays is a 79 y.o. female being evaluated by Pharmacy for vancomycin dosing.    Indication for Therapy: pneumonia with septic shock, immunosuppressed due to recent chemotherapy  Currently Estimated Renal Function: ClCr 20 mL/min using serum Cr of 2.98 mg/dL (usual baseline Cr ~ 0.9 mg/dL)  Proposed Empiric Regimen Using Predicted Pharmacokinetic Parameters and Demographics: intermittent dosing due to DONNIE, vasopressors currently off, random level reported as 27.6 mcg/mL collected 5.75 hrs post loading dose infusion.   Duration of Therapy Ordered: 7 days    Monitoring Planned: No redosing is needed at this time based on anticipated poor clearance.  Will repeat a random level tomorrow AM and continue to follow her serum Cr trend to guide further dosing.      Thank you.  Richa Patino, Pharm.D.  11/24/2024  16:09 EST

## 2024-11-24 NOTE — PROGRESS NOTES
[facility] HOSPITALIST PROGRESS NOTE     Patient Identification:  Name:  Mayra Hays  Age:  79 y.o.  Sex:  female  :  1945  MRN:  [id number]  Visit Number:  [id number]  ROOM: [place] [id number]     Primary Care Provider:  Niki Antony APRN    Length of stay in inpatient status:  1    Subjective     Chief Compliant:    Chief Complaint   Patient presents with    Vomiting    Weakness - Generalized       History of Presenting Illness:    Patient reported still not feeling well today, but says that her shortness of breath has improved from yesterday. She still has a productive cough. She reports having some nausea today and said she didn't feel like eating breakfast, but the nausea was improved by zofran.     Objective     Current Hospital Meds:apixaban, 5 mg, Oral, Q12H  atorvastatin, 20 mg, Oral, Daily  budesonide-formoterol, 2 puff, Inhalation, BID - RT   And  ipratropium, 0.5 mg, Nebulization, Q6H - RT  cefepime, 2,000 mg, Intravenous, Q24H  hydrOXYzine, 25 mg, Oral, Nightly  [START ON 2024] pantoprazole, 40 mg, Oral, Q AM  sodium chloride, 10 mL, Intravenous, Q12H  Vancomycin Pharmacy Intermittent/Pulse Dosing, , Not Applicable, Daily    norepinephrine, 0.02-0.3 mcg/kg/min, Last Rate: Stopped (24 1115)        Current Antimicrobial Therapy:  Anti-Infectives (From admission, onward)      Ordered     Dose/Rate Route Frequency Start Stop    24 1758  cefepime 2000 mg IVPB in 100 mL NS (VTB)        Ordering Provider: Portia Contreras DO    2,000 mg  over 4 Hours Intravenous Every 24 Hours 24 1500 24 1459    24 1801  Vancomycin Pharmacy Intermittent/Pulse Dosing        Ordering Provider: Portia Contreras DO     Not Applicable Daily 24 1816 24 0859    24 1501  vancomycin IVPB 2000 mg in 0.9% Sodium Chloride 500 mL        Ordering Provider: Melvin Larson MD    20 mg/kg × 95.3 kg  250 mL/hr over 120 Minutes Intravenous Once 24 1517 24  1738    11/23/24 1406  cefepime 2000 mg IVPB in 100 mL NS (VTB)        Ordering Provider: Melvin Larson MD    2,000 mg  over 30 Minutes Intravenous Once 11/23/24 1422 11/23/24 1535          Current Diuretic Therapy:  Diuretics (From admission, onward)      None          ----------------------------------------------------------------------------------------------------------------------  Vital Signs:  Temp:  [98.1 °F (36.7 °C)-99.3 °F (37.4 °C)] 99.3 °F (37.4 °C)  Heart Rate:  [69-92] 87  Resp:  [16-28] 16  BP: ()/(27-98) 99/60  SpO2:  [88 %-100 %] 92 %  on  Flow (L/min) (Oxygen Therapy):  [2.5-3] 3;   Device (Oxygen Therapy): nasal cannula  Body mass index is 34.61 kg/m².    Wt Readings from Last 3 Encounters:   11/24/24 106 kg (234 lb 5.6 oz)   11/21/24 95.9 kg (211 lb 8 oz)   11/21/24 95.9 kg (211 lb 8 oz)     Intake & Output (last 3 days)         11/21 0701  11/22 0700 11/22 0701  11/23 0700 11/23 0701  11/24 0700 11/24 0701  11/25 0700    P.O.    702    I.V. (mL/kg)   1180.2 (11.1) 264.1 (2.5)    Total Intake(mL/kg)   1180.2 (11.1) 966.1 (9.1)    Urine (mL/kg/hr)   500     Total Output   500     Net   +680.2 +966.1            Urine Unmeasured Occurrence    3 x    Stool Unmeasured Occurrence    2 x          Diet: Regular/House; Fluid Consistency: Thin (IDDSI 0)  ----------------------------------------------------------------------------------------------------------------------  Physical exam:   Constitutional:  Well-developed and well-nourished.  No acute distress.      HENT:  Head:  Normocephalic and atraumatic.   Cardiovascular:  Normal rate, irregularly irregular rhythm  Pulmonary/Chest:  No respiratory distress, rhonchi bilaterally  Abdominal:  Soft. No distension and no tenderness.   Musculoskeletal:  No deformity.   Neurological: Awake, alert, no focal deficit on gross examination. No slurred speech or facial droop.   Skin:  Skin is warm and dry.   Peripheral vascular:  No cyanosis, no  "extremity edema.  Psychiatric: Appropriate mood and affect    ----------------------------------------------------------------------------------------------------------------------  Results from last 7 days   Lab Units 11/23/24 2318 11/23/24 2003 11/23/24  1707 11/23/24 1416 11/23/24 1416 11/21/24  1434   CRP mg/dL  --   --   --   --  18.67*  --    LACTATE mmol/L 1.5 2.8* 4.0*   < > 4.5*  --    WBC 10*3/mm3 26.67*  --   --   --  33.38* 12.07*   HEMOGLOBIN g/dL 10.3*  --   --   --  13.7 12.8   HEMATOCRIT % 34.1  --   --   --  44.0 40.6   MCV fL 88.6  --   --   --  86.1 85.3   MCHC g/dL 30.2*  --   --   --  31.1* 31.5   PLATELETS 10*3/mm3 169  --   --   --  253 252   INR   --   --   --   --  1.12*  --     < > = values in this interval not displayed.     Results from last 7 days   Lab Units 11/23/24  1446   PH, ARTERIAL pH units 7.327*   PO2 ART mm Hg 57.3*   PCO2, ARTERIAL mm Hg 38.6   HCO3 ART mmol/L 20.2     Results from last 7 days   Lab Units 11/23/24 2318 11/23/24 1419 11/23/24 1416 11/21/24  1434   SODIUM mmol/L 141  --  140 137   POTASSIUM mmol/L 4.3  --  4.3 4.2   MAGNESIUM mg/dL  --   --  1.9  --    CHLORIDE mmol/L 105  --  100 97*   CO2 mmol/L 20.7*  --  19.3* 27.7   BUN mg/dL 50*  --  47* 36*   CREATININE mg/dL 2.98*  --  3.26* 1.35*   CALCIUM mg/dL 8.3*  --  9.8 9.6   IONIZED CALCIUM mmol/L  --  1.12*  --   --    GLUCOSE mg/dL 157*  --  194* 281*   ALBUMIN g/dL  --   --  4.3 4.2   BILIRUBIN mg/dL  --   --  0.8 0.4   ALK PHOS U/L  --   --  112 112   AST (SGOT) U/L  --   --  19 13   ALT (SGPT) U/L  --   --  17 10   Estimated Creatinine Clearance: 20.2 mL/min (A) (by C-G formula based on SCr of 2.98 mg/dL (H)).  No results found for: \"AMMONIA\"  Results from last 7 days   Lab Units 11/23/24  1714 11/23/24  1416   HSTROP T ng/L 20* 27*             No results found for: \"HGBA1C\", \"POCGLU\"  Lab Results   Component Value Date    TSH 2.250 11/21/2024    FREET4 1.04 11/21/2024     No results found for: " "\"PREGTESTUR\", \"PREGSERUM\", \"HCG\", \"HCGQUANT\"  Pain Management Panel  More data exists         Latest Ref Rng & Units 1/2/2024 1/1/2024   Pain Management Panel   Creatinine, Urine mg/dL 152.1  -   Amphetamine, Urine Qual Negative - Negative    Barbiturates Screen, Urine Negative - Negative    Benzodiazepine Screen, Urine Negative - Negative    Buprenorphine, Screen, Urine Negative - Negative    Cocaine Screen, Urine Negative - Negative    Fentanyl, Urine Negative - Negative    Methadone Screen , Urine Negative - Negative    Methamphetamine, Ur Negative - Negative       Details                 Brief Urine Lab Results  (Last result in the past 365 days)        Color   Clarity   Blood   Leuk Est   Nitrite   Protein   CREAT   Urine HCG        11/23/24 1459 Yellow   Clear   Negative   Negative   Negative   Negative                 Blood Culture   Date Value Ref Range Status   11/23/2024 No growth at 24 hours  Preliminary   11/23/2024 No growth at 24 hours  Preliminary       Results from last 7 days   Lab Units 11/23/24  2318 11/23/24 2003 11/23/24  1707 11/23/24  1416   PROCALCITONIN ng/mL  --   --   --  21.54*   LACTATE mmol/L 1.5 2.8* 4.0* 4.5*   CRP mg/dL  --   --   --  18.67*       I have personally looked at the labs and they are summarized above.  ----------------------------------------------------------------------------------------------------------------------  Detailed radiology reports for the last 24 hours:  Imaging Results (Last 24 Hours)       Procedure Component Value Units Date/Time    CT Chest Without Contrast Diagnostic [[id number]] Collected: 11/23/24 1628     Updated: 11/23/24 1636    Narrative:      VERIFICATION OBSERVER NAME: Claudio Rodgers MD.     Technique: Axial images were obtained along with coronal and sagittal  reconstruction. DLP in mGycm reported in the EMR records. Dose lowering  technique: Automated exposure control with adjustment of the MA and/or  KV, use of iterative reconstruction. Data " included in the medical  records.     HISTORY/COMPARISON/FINDINGS:     Comparison: None.     No hilar or mediastinal adenopathy.  Large consolidation RIGHT upper lobe with air bronchogram grams  consistent with pneumonia.  No mediastinal adenopathy.  Minimal cardiac enlargement.  No pericardial effusion.  Scattered nodules and extensive consolidation RIGHT lower lung field  consistent with pneumonia.  LEFT lung is clear.  No pleural effusion.  Small hiatal hernia.     Liver and spleen appeared unremarkable.  Thickened LEFT adrenal gland likely due to multiple nodules or  hyperplasia.  Post cholecystectomy.  Fatty infiltration of the pancreas.  No  inflammatory or infectious process.  No kidney stones or hydronephrosis.    No evidence of bowel obstruction.  Urinary bladder is unremarkable.  Female pelvic organs are normal.  Normal appendix.  Degenerative changes at L5-S1.       Impression:      Multifocal pneumonia in the chest.  No acute process noted in the abdomen or pelvis.                             [id number], Zip code [id number].        This report was finalized on 11/23/2024 4:34 PM by Dr. Claudio Rodgers MD.       CT Abdomen Pelvis Without Contrast [[id number]] Resulted: 11/23/24 1548     Updated: 11/23/24 1612    XR Chest 1 View [[id number]] Collected: 11/23/24 1532     Updated: 11/23/24 1535    Narrative:      INDICATION: Weakness.     TECHNIQUE: Frontal radiograph of the chest.     COMPARISON: 6/6/2024.     FINDINGS:   Cardiomegaly. Elevation of the right hemidiaphragm. Subsegmental  atelectasis/airspace disease in the lung bases. Right upper lobe  perihilar opacity again noted. No pleural effusion or pneumothorax.  Chest port catheter in similar position. No acute fracture.       Impression:      Subsegmental atelectasis/airspace disease in the lung bases.     This report was finalized on 11/23/2024 3:33 PM by Alex Pallas, DO.             Assessment & Plan      #Septic shock secondary to pneumonia  #Multifocal  pneumonia due to undetermined organism  #Immunocompromised state due to chemotherapy  #Acute on chronic hypoxic respiratory failure  #COPD with acute exacerbation (increased oxygen requirement, productive cough)  - patient required levophed overnight and this is now being weaned down  - continue broad spectrum empiric antibiotics with cefepime and vancomycin. Follow up blood and respiratory culture results.  - Strep pneumo antigen and legionella antigen ordered, not yet resulted  - MRSA screen not yet resulted  - Titrate O2 to keep oxygen saturation >90%. Currently requiring 3L.  - solu-medrol and duo-nebs ordered for COPD exacerbation   - CBC in a.m.    #Acute kidney injury, improving  - continue treatment of underlying causes. Suspect ATN due to septic shock.  - received 30mL/kg fluid bolus at admission  - continue gentle maintenance fluids until tolerating adequate oral intake  - avoid nephrotoxins  - BMP in a.m.    #Nausea and vomiting  - prn zofran ordered    #paroxysmal atrial fibrillation  - does not appear to be on any rate controlling agents at home.  - continue home eliquis for stroke prophylaxis    #Hypertension  - currently requiring levophed for blood pressure support, so hold all antihypertensive medications    #CHFpEF  #Coronary artery disease   - monitor fluid status. I reviewed her most recent echo from 2023 which showed normal LV EF and also normal LV diastolic function.    #Type II DM  - holding home metformin due to renal function  - monitor with accuchecks. If blood glucose is staying elevated >180 then can start low dose sliding scale insulin.    #Non-small cell lung cancer  - recommend close outpatient follow up    Dispo: pending clinical progress    Portia Contreras DO  [facility]  11/24/24  14:47 EST

## 2024-11-24 NOTE — PLAN OF CARE
Goal Outcome Evaluation:              Outcome Evaluation: patient BP improved, now off of vasopressors. Currently sitting in chair.

## 2024-11-24 NOTE — PLAN OF CARE
Goal Outcome Evaluation:  Plan of Care Reviewed With: patient           Outcome Evaluation: Patient alert, patient on 2.5L NC and tolerating well, q2 hr turn, call light within reach, Levo infusing--see MAR, and bed alarm set.

## 2024-11-24 NOTE — H&P
"    [facility]    Patient Identification:  Name:  Mayra Hays  Age:  79 y.o.  Sex:  female  :  1945  MRN:  [id number]   Admit Date: 2024   Visit Number:  [id number]  Room number:  P213/S2  Primary Care Physician:  Niki Antony APRN     Subjective     Chief complaint:    Chief Complaint   Patient presents with    Vomiting    Weakness - Generalized       History of presenting illness:   Mayra Hays is our 79 year old female patient with a past medical history significant for non-small cell lung cancer undergoing chemotherapy (last treatment was 2 days ago), CHF, atrial fibrillation on chronic anticoagulation with eliquis, hypertension, hyperlipidemia, and CKD stage II/IIIa who presented to the ER today with complaint of generalized weakness and chest tightness.    Patient reports that she began to feel bad last night with severe weakness, malaise, productive cough, and shortness of breath described as her like her chest is \"tied up\". She also reports generalized dull abdominal pain. Family member present reports that she had multiple episodes of vomiting overnight. In the ER she was found to have multifocal pneumonia and she was slightly hypotensive which improved with an IV fluid bolus.     Review of Systems   Constitutional:  Positive for fatigue. Negative for fever.   HENT:  Positive for congestion. Negative for sore throat.    Respiratory:  Positive for cough and shortness of breath.    Cardiovascular:  Negative for chest pain and leg swelling.   Gastrointestinal:  Positive for abdominal pain, nausea and vomiting.   Genitourinary:  Negative for difficulty urinating and dysuria.   Musculoskeletal:  Negative for arthralgias and myalgias.   Skin:  Negative for rash and wound.   Neurological:  Negative for dizziness and syncope.   Psychiatric/Behavioral:  Negative for agitation and confusion.      Past Medical History:   Diagnosis Date    Arthritis     " CHF (congestive heart failure)     Chronic anticoagulation     Eliquis    Chronic kidney disease     stage II/IIIa    Collapsed lung     COPD (chronic obstructive pulmonary disease)     Diabetes mellitus     TYPE 2    Elevated cholesterol     GERD (gastroesophageal reflux disease)     Hyperlipidemia     Hypertension     Non-small cell lung cancer RUL     Sleep apnea     non compliant with CPAP    Stroke 2019    Wears eyeglasses      Past Surgical History:   Procedure Laterality Date    BRONCHOSCOPY Bilateral 02/27/2023    Procedure: BRONCHOSCOPY WITH ENDOBRONCHIAL ULTRASOUND;  Surgeon: Abdulkadir Peter MD;  Location: [facility] OR;  Service: Pulmonary;  Laterality: Bilateral;    BRONCHOSCOPY WITH ION ROBOTIC ASSIST N/A 9/6/2023    Procedure: BRONCHOSCOPY WITH ION ROBOT AND EBUS;  Surgeon: John Clemens MD;  Location:  [facility];  Service: Robotics - Pulmonary;  Laterality: N/A;  Ion cath #6 - 0032,  #6 - 0030, cath guide # 0077. EBUS scope removed with balloon intact.    CARDIAC CATHETERIZATION N/A 08/22/2017    Procedure: Left Heart Cath;  Surgeon: Jatinder Matias MD;  [facility];  Service:     CARDIAC CATHETERIZATION N/A 11/22/2021    Procedure: Left Heart Cath;  Surgeon: Tolu Steinberg MD;  Location:  [facility];  Service: Cardiology;  Laterality: N/A;    COLONOSCOPY      ENDOSCOPY      GALLBLADDER SURGERY      PORTACATH PLACEMENT N/A 9/29/2023    Procedure: INSERTION OF PORTACATH;  Surgeon: Petr Velásquez MD;  Location:  [facility] OR;  Service: General;  Laterality: N/A;    VENTRAL HERNIA REPAIR N/A 05/14/2020    Procedure: VENTRAL HERNIA REPAIR LAPAROSCOPIC WITH DAVINCI ROBOT;  Surgeon: Petr Velásquez MD;  Location:  [facility];  Service: DaVinci;  Laterality: N/A;     Family History   Problem Relation Age of Onset    Heart disease Mother         Rhianna sophia    Heart disease Father     Heart attack Father     Heart failure Father      Social  History     Socioeconomic History    Marital status: Single    Number of children: 6   Tobacco Use    Smoking status: Former     Current packs/day: 0.00     Average packs/day: 3.0 packs/day for 16.7 years (50.2 ttl pk-yrs)     Types: Cigarettes     Start date: 4/3/1998     Quit date: 2015     Years since quittin.9     Passive exposure: Past    Smokeless tobacco: Never   Vaping Use    Vaping status: Never Used   Substance and Sexual Activity    Alcohol use: No    Drug use: No    Sexual activity: Never       Allergies:  Paclitaxel  Medications below are reported home medications pulling from within the system; at this time, these medications have not been reconciled unless otherwise specified and are in the verification process for further verifcation as current home medications.    Prior to Admission Medications       Prescriptions Last Dose Informant Patient Reported? Taking?    albuterol sulfate  (90 Base) MCG/ACT inhaler   No No    Inhale 2 puffs by mouth Every 4 (Four) Hours As Needed for Wheezing.    apixaban (ELIQUIS) 5 MG tablet tablet   No No    Take 1 tablet by mouth Every 12 (Twelve) Hours.    atorvastatin (LIPITOR) 20 MG tablet   Yes No    Take 1 tablet by mouth Daily.    Budeson-Glycopyrrol-Formoterol (Breztri Aerosphere) 160-9-4.8 MCG/ACT aerosol inhaler   No No    Inhale 2 puffs by mouth 2 (Two) Times a Day.    esomeprazole (nexIUM) 20 MG capsule   Yes No    Take 1 capsule by mouth Daily.    hydrOXYzine pamoate (VISTARIL) 25 MG capsule   Yes No    Take 1 capsule by mouth Every Night.    ipratropium-albuterol (DUO-NEB) 0.5-2.5 mg/3 ml nebulizer   No No    Take 3 mL by nebulization Every 4 (Four) Hours As Needed for Wheezing or Shortness of Air.    lidocaine-prilocaine (EMLA) 2.5-2.5 % cream   No No    Apply to port-a-cath site 30 minutes prior to arrival at infusion center. Cover with plastic wrap.    lisinopril (PRINIVIL,ZESTRIL) 10 MG tablet   No No    Take 1 tablet by mouth Daily.     metFORMIN (GLUCOPHAGE) 500 MG tablet  Pharmacy Yes No    Take 1 tablet by mouth 2 (Two) Times a Day As Needed (only takes if blood glucose is above 200). As needed    prochlorperazine (COMPAZINE) 10 MG tablet   No No    Take 1 tablet by mouth Every 6 (Six) Hours As Needed for Nausea or Vomiting.    spironolactone (ALDACTONE) 25 MG tablet  Pharmacy, Care Giver Yes No    Take 1 tablet by mouth Daily.          Objective     Vital Signs:  Temp:  [97.9 °F (36.6 °C)-98.2 °F (36.8 °C)] 98.2 °F (36.8 °C)  Heart Rate:  [69-81] 77  Resp:  [16-19] 19  BP: ()/() 92/47    Mean Arterial Pressure (Non-Invasive) for the past 24 hrs (Last 3 readings):   Noninvasive MAP (mmHg)   11/23/24 1840 66   11/23/24 1757 68   11/23/24 1730 42     SpO2:  [90 %-92 %] 90 %  on   ;      Body mass index is 31.01 kg/m².    Wt Readings from Last 3 Encounters:   11/23/24 95.3 kg (210 lb)   11/21/24 95.9 kg (211 lb 8 oz)   11/21/24 95.9 kg (211 lb 8 oz)        Physical Exam:   Constitutional:  Well-developed and well-nourished. Acutely and chronically ill appearing. No acute distress.      HENT:  Head:  Normocephalic and atraumatic.  Mouth:  Moist mucous membranes.    Eyes:  Conjunctivae normal.   Neck:  Neck supple.    Cardiovascular:  Normal rate, irregularly irregular rhythm. No murmur.  Pulmonary/Chest:  Normal rate and effort. Diffuse rhonchi bilaterally.  Abdominal:  Soft. No distension, mild generalized tenderness that is worse in lower abdomen. Normal bowel sounds present.  Musculoskeletal:  No tenderness and no deformity.  No red or swollen joints anywhere.    Neurological: Awake, alert, no focal deficit on gross examination. No slurred speech or facial droop.    Skin:  Skin is warm and dry. No rash noted. Appears pale. There is a petechial appearing rash on lower extremities.  Peripheral vascular:  No cyanosis, no edema.    EKG:    ECG 12 Lead Other; Sepsis   Final Result   Test Reason : Other~   Blood Pressure :   */*   mmHG    Vent. Rate :  74 BPM     Atrial Rate :  74 BPM      P-R Int : 160 ms          QRS Dur : 100 ms       QT Int : 416 ms       P-R-T Axes :  70 -14 117 degrees     QTcB Int : 461 ms      Sinus rhythm with premature supraventricular complexes   Minimal voltage criteria for LVH, may be normal variant   Cannot rule out Anterior infarct (cited on or before 01-Jan-2024)   Abnormal ECG   When compared with ECG of 06-Jun-2024 15:10,   premature supraventricular complexes are now present   ST now depressed in Inferior leads   Nonspecific T wave abnormality now evident in Inferior leads   T wave inversion more evident in Lateral leads   Confirmed by Aldair Segovia ([id number]) on 11/23/2024 3:16:29 PM      Referred By: SHONDA           Confirmed By: Aldair Segovia          Telemetry:  irregularly irregular rhythm, rate in the 80s      Last echocardiogram:  Results for orders placed during the hospital encounter of 04/10/24    Adult Transthoracic Echo Complete w/ Color, Spectral and Contrast if necessary per protocol    Interpretation Summary    Left ventricular systolic function is normal. Calculated left ventricular EF = 52% Left ventricular ejection fraction appears to be 51 - 55%.    Left ventricular diastolic function was normal.    The left atrial cavity is moderately dilated.    Estimated right ventricular systolic pressure from tricuspid regurgitation is mildly elevated (35-45 mmHg).      Labs:  Results from last 7 days   Lab Units 11/23/24  1707 11/23/24  1416 11/21/24  1434   PROCALCITONIN ng/mL  --  21.54*  --    LACTATE mmol/L 4.0* 4.5*  --    CRP mg/dL  --  18.67*  --    WBC 10*3/mm3  --  33.38* 12.07*   HEMOGLOBIN g/dL  --  13.7 12.8   HEMATOCRIT %  --  44.0 40.6   MCV fL  --  86.1 85.3   MCHC g/dL  --  31.1* 31.5   PLATELETS 10*3/mm3  --  253 252   INR   --  1.12*  --      Results from last 7 days   Lab Units 11/23/24  1446   PH, ARTERIAL pH units 7.327*   PO2 ART mm Hg 57.3*   PCO2, ARTERIAL mm Hg 38.6   HCO3 ART mmol/L  "20.2     Results from last 7 days   Lab Units 11/23/24  1419 11/23/24  1416 11/21/24  1434   SODIUM mmol/L  --  140 137   POTASSIUM mmol/L  --  4.3 4.2   MAGNESIUM mg/dL  --  1.9  --    CHLORIDE mmol/L  --  100 97*   CO2 mmol/L  --  19.3* 27.7   BUN mg/dL  --  47* 36*   CREATININE mg/dL  --  3.26* 1.35*   CALCIUM mg/dL  --  9.8 9.6   IONIZED CALCIUM mmol/L 1.12*  --   --    GLUCOSE mg/dL  --  194* 281*   ALBUMIN g/dL  --  4.3 4.2   BILIRUBIN mg/dL  --  0.8 0.4   ALK PHOS U/L  --  112 112   AST (SGOT) U/L  --  19 13   ALT (SGPT) U/L  --  17 10   Estimated Creatinine Clearance: 17.5 mL/min (A) (by C-G formula based on SCr of 3.26 mg/dL (H)).    No results found for: \"AMMONIA\"  Results from last 7 days   Lab Units 11/23/24  1714 11/23/24  1416   HSTROP T ng/L 20* 27*         No results found for: \"HGBA1C\", \"POCGLU\"  Lab Results   Component Value Date    TSH 2.250 11/21/2024    FREET4 1.04 11/21/2024     No results found for: \"PREGTESTUR\", \"PREGSERUM\", \"HCG\", \"HCGQUANT\"  Pain Management Panel  More data exists         Latest Ref Rng & Units 1/2/2024 1/1/2024   Pain Management Panel   Creatinine, Urine mg/dL 152.1  -   Amphetamine, Urine Qual Negative - Negative    Barbiturates Screen, Urine Negative - Negative    Benzodiazepine Screen, Urine Negative - Negative    Buprenorphine, Screen, Urine Negative - Negative    Cocaine Screen, Urine Negative - Negative    Fentanyl, Urine Negative - Negative    Methadone Screen , Urine Negative - Negative    Methamphetamine, Ur Negative - Negative       Details                 Brief Urine Lab Results  (Last result in the past 365 days)        Color   Clarity   Blood   Leuk Est   Nitrite   Protein   CREAT   Urine HCG        11/23/24 1459 Yellow   Clear   Negative   Negative   Negative   Negative                   I have personally looked at the labs and they are summarized above.    Detailed radiology reports for the last 24 hours:    Imaging Results (Last 24 Hours)       Procedure " Component Value Units Date/Time    CT Chest Without Contrast Diagnostic [[id number]] Collected: 11/23/24 1628     Updated: 11/23/24 1636    Narrative:      VERIFICATION OBSERVER NAME: Claudio Rodgers MD.     Technique: Axial images were obtained along with coronal and sagittal  reconstruction. DLP in mGycm reported in the EMR records. Dose lowering  technique: Automated exposure control with adjustment of the MA and/or  KV, use of iterative reconstruction. Data included in the medical  records.     HISTORY/COMPARISON/FINDINGS:     Comparison: None.     No hilar or mediastinal adenopathy.  Large consolidation RIGHT upper lobe with air bronchogram grams  consistent with pneumonia.  No mediastinal adenopathy.  Minimal cardiac enlargement.  No pericardial effusion.  Scattered nodules and extensive consolidation RIGHT lower lung field  consistent with pneumonia.  LEFT lung is clear.  No pleural effusion.  Small hiatal hernia.     Liver and spleen appeared unremarkable.  Thickened LEFT adrenal gland likely due to multiple nodules or  hyperplasia.  Post cholecystectomy.  Fatty infiltration of the pancreas.  No  inflammatory or infectious process.  No kidney stones or hydronephrosis.    No evidence of bowel obstruction.  Urinary bladder is unremarkable.  Female pelvic organs are normal.  Normal appendix.  Degenerative changes at L5-S1.       Impression:      Multifocal pneumonia in the chest.  No acute process noted in the abdomen or pelvis.                             [id number], Zip code [id number].        This report was finalized on 11/23/2024 4:34 PM by Dr. Claudio Rodgers MD.       CT Abdomen Pelvis Without Contrast [[id number]] Resulted: 11/23/24 1548     Updated: 11/23/24 1612    XR Chest 1 View [[id number]] Collected: 11/23/24 1532     Updated: 11/23/24 1535    Narrative:      INDICATION: Weakness.     TECHNIQUE: Frontal radiograph of the chest.     COMPARISON: 6/6/2024.     FINDINGS:   Cardiomegaly. Elevation of the right  hemidiaphragm. Subsegmental  atelectasis/airspace disease in the lung bases. Right upper lobe  perihilar opacity again noted. No pleural effusion or pneumothorax.  Chest port catheter in similar position. No acute fracture.       Impression:      Subsegmental atelectasis/airspace disease in the lung bases.     This report was finalized on 11/23/2024 3:33 PM by Alex Pallas, DO.             Final impressions for the last 30 days of radiology reports:    CT Chest Without Contrast Diagnostic    Result Date: 11/23/2024  Multifocal pneumonia in the chest. No acute process noted in the abdomen or pelvis.          [id number], Zip code [id number].   This report was finalized on 11/23/2024 4:34 PM by Dr. Claudio Rodgers MD.      XR Chest 1 View    Result Date: 11/23/2024  Subsegmental atelectasis/airspace disease in the lung bases.  This report was finalized on 11/23/2024 3:33 PM by Alex Pallas, DO.      CT Chest With Contrast Diagnostic    Result Date: 11/11/2024  1.  Soft tissue density with volume loss and air bronchograms emanating from the right suprahilar aspect has features on CT most consistent with evolving fibrosis. 2.  No discrete pulmonary nodule or mass has developed in the right lung to suggest recurrent disease. 3.  No hilar or mediastinal adenopathy based on CT size criteria. 4.  Centrilobular nodules throughout the left mid-lower lung zones noted most consistent with atypical pneumonia or aspiration pneumonitis. 5.  Mild interstitial thickening favors mild edema in the setting of CHF or volume overload.   This report was finalized on 11/11/2024 12:06 PM by Dr. Kunal Castanon MD.         Assessment & Plan      #Septic shock by CMS criteria with lactate >4, WBC >12  #Multifocal pneumonia  #Immunocompromised state due to chemotherapy  #Nausea and vomiting, resolved  #Acute kidney injury, suspect due to sepsis   #Acute on chronic hypoxic respiratory failure  - Started on broad spectrum empiric antibiotics in the ER with  cefepime and vancomycin. Continue same.  - Baseline creatinine appears to be around 0.9 and creatinine today is 3.26. Avoid nephrotoxins. She received a 30mL/kg fluid bolus in the ER. Repeat BMP in a.m.  - follow up blood cultures. Check MRSA nasal PCR, strep pneumo antigen, legionella antigen.   - BP was still borderline low after IV fluids. If MAP does not maintain >65 then we will start levophed for blood pressure support.   - CBC in a.m. to trend leukocytosis.   - Patient reportedly only wears 2L O2 nightly, but is requiring it continuously at this time and pO2 on room air was low at 57.3. Titrate supplemental oxygen to keep O2 saturation >90%    #Non-small cell lung cancer  - supportive care, outpatient follow up with hematology/oncology     Dispo: admit INPATIENT status due to the need for care which can only be reasonably provided in an hospital setting such as aggressive/expedited ancillary services and/or consultation services, the necessity for IV medications, close physician monitoring and/or the possible need for procedures.  In such, I feel patient’s risk for adverse outcomes and need for care warrant INPATIENT evaluation and predict the patient’s care encounter to likely last beyond 2 midnights.     Portia Contreras DO  [facility]  11/23/24  19:37 EST

## 2024-11-25 LAB
ANION GAP SERPL CALCULATED.3IONS-SCNC: 13.8 MMOL/L (ref 5–15)
BACTERIA BLD CULT: ABNORMAL
BOTTLE TYPE: ABNORMAL
BUN SERPL-MCNC: 42 MG/DL (ref 8–23)
BUN/CREAT SERPL: 21.2 (ref 7–25)
CALCIUM SPEC-SCNC: 8 MG/DL (ref 8.6–10.5)
CHLORIDE SERPL-SCNC: 110 MMOL/L (ref 98–107)
CO2 SERPL-SCNC: 17.2 MMOL/L (ref 22–29)
CREAT SERPL-MCNC: 1.98 MG/DL (ref 0.57–1)
CRP SERPL-MCNC: 28.35 MG/DL (ref 0–0.5)
DEPRECATED RDW RBC AUTO: 45.7 FL (ref 37–54)
EGFRCR SERPLBLD CKD-EPI 2021: 25.5 ML/MIN/1.73
ERYTHROCYTE [DISTWIDTH] IN BLOOD BY AUTOMATED COUNT: 14.2 % (ref 12.3–15.4)
GLUCOSE BLDC GLUCOMTR-MCNC: 306 MG/DL (ref 70–130)
GLUCOSE BLDC GLUCOMTR-MCNC: 323 MG/DL (ref 70–130)
GLUCOSE BLDC GLUCOMTR-MCNC: 347 MG/DL (ref 70–130)
GLUCOSE SERPL-MCNC: 226 MG/DL (ref 65–99)
HCT VFR BLD AUTO: 34.3 % (ref 34–46.6)
HGB BLD-MCNC: 10.3 G/DL (ref 12–15.9)
MCH RBC QN AUTO: 26.8 PG (ref 26.6–33)
MCHC RBC AUTO-ENTMCNC: 30 G/DL (ref 31.5–35.7)
MCV RBC AUTO: 89.3 FL (ref 79–97)
PLATELET # BLD AUTO: 152 10*3/MM3 (ref 140–450)
PMV BLD AUTO: 11 FL (ref 6–12)
POTASSIUM SERPL-SCNC: 4.8 MMOL/L (ref 3.5–5.2)
PROCALCITONIN SERPL-MCNC: 6.85 NG/ML (ref 0–0.25)
RBC # BLD AUTO: 3.84 10*6/MM3 (ref 3.77–5.28)
S PNEUM AG SPEC QL LA: NEGATIVE
SODIUM SERPL-SCNC: 141 MMOL/L (ref 136–145)
VANCOMYCIN SERPL-MCNC: 11.7 MCG/ML (ref 5–40)
WBC NRBC COR # BLD AUTO: 17.99 10*3/MM3 (ref 3.4–10.8)

## 2024-11-25 PROCEDURE — 94761 N-INVAS EAR/PLS OXIMETRY MLT: CPT

## 2024-11-25 PROCEDURE — 84145 PROCALCITONIN (PCT): CPT | Performed by: STUDENT IN AN ORGANIZED HEALTH CARE EDUCATION/TRAINING PROGRAM

## 2024-11-25 PROCEDURE — 99232 SBSQ HOSP IP/OBS MODERATE 35: CPT | Performed by: INTERNAL MEDICINE

## 2024-11-25 PROCEDURE — 86140 C-REACTIVE PROTEIN: CPT | Performed by: STUDENT IN AN ORGANIZED HEALTH CARE EDUCATION/TRAINING PROGRAM

## 2024-11-25 PROCEDURE — 25010000002 CEFEPIME PER 500 MG: Performed by: STUDENT IN AN ORGANIZED HEALTH CARE EDUCATION/TRAINING PROGRAM

## 2024-11-25 PROCEDURE — 25010000002 METHYLPREDNISOLONE PER 40 MG: Performed by: STUDENT IN AN ORGANIZED HEALTH CARE EDUCATION/TRAINING PROGRAM

## 2024-11-25 PROCEDURE — 94664 DEMO&/EVAL PT USE INHALER: CPT

## 2024-11-25 PROCEDURE — 94799 UNLISTED PULMONARY SVC/PX: CPT

## 2024-11-25 PROCEDURE — 97162 PT EVAL MOD COMPLEX 30 MIN: CPT

## 2024-11-25 PROCEDURE — 80048 BASIC METABOLIC PNL TOTAL CA: CPT | Performed by: STUDENT IN AN ORGANIZED HEALTH CARE EDUCATION/TRAINING PROGRAM

## 2024-11-25 PROCEDURE — 63710000001 INSULIN LISPRO (HUMAN) PER 5 UNITS: Performed by: INTERNAL MEDICINE

## 2024-11-25 PROCEDURE — 80202 ASSAY OF VANCOMYCIN: CPT

## 2024-11-25 PROCEDURE — 82948 REAGENT STRIP/BLOOD GLUCOSE: CPT

## 2024-11-25 PROCEDURE — 36415 COLL VENOUS BLD VENIPUNCTURE: CPT | Performed by: STUDENT IN AN ORGANIZED HEALTH CARE EDUCATION/TRAINING PROGRAM

## 2024-11-25 PROCEDURE — 25010000002 VANCOMYCIN 5 G RECONSTITUTED SOLUTION: Performed by: STUDENT IN AN ORGANIZED HEALTH CARE EDUCATION/TRAINING PROGRAM

## 2024-11-25 PROCEDURE — 25810000003 SODIUM CHLORIDE 0.9 % SOLUTION: Performed by: STUDENT IN AN ORGANIZED HEALTH CARE EDUCATION/TRAINING PROGRAM

## 2024-11-25 PROCEDURE — 85027 COMPLETE CBC AUTOMATED: CPT | Performed by: STUDENT IN AN ORGANIZED HEALTH CARE EDUCATION/TRAINING PROGRAM

## 2024-11-25 RX ORDER — NICOTINE POLACRILEX 4 MG
15 LOZENGE BUCCAL
Status: DISCONTINUED | OUTPATIENT
Start: 2024-11-25 | End: 2024-11-27 | Stop reason: HOSPADM

## 2024-11-25 RX ORDER — LEVOFLOXACIN 750 MG/1
750 TABLET, FILM COATED ORAL
Status: DISCONTINUED | OUTPATIENT
Start: 2024-11-25 | End: 2024-11-27 | Stop reason: HOSPADM

## 2024-11-25 RX ORDER — GLUCAGON 1 MG/ML
1 KIT INJECTION
Status: DISCONTINUED | OUTPATIENT
Start: 2024-11-25 | End: 2024-11-27 | Stop reason: HOSPADM

## 2024-11-25 RX ORDER — MINOCYCLINE HYDROCHLORIDE 50 MG/1
200 CAPSULE ORAL EVERY 12 HOURS SCHEDULED
Status: DISCONTINUED | OUTPATIENT
Start: 2024-11-25 | End: 2024-11-27 | Stop reason: HOSPADM

## 2024-11-25 RX ORDER — INSULIN LISPRO 100 [IU]/ML
2-9 INJECTION, SOLUTION INTRAVENOUS; SUBCUTANEOUS
Status: DISCONTINUED | OUTPATIENT
Start: 2024-11-25 | End: 2024-11-27 | Stop reason: HOSPADM

## 2024-11-25 RX ORDER — DEXTROSE MONOHYDRATE 25 G/50ML
25 INJECTION, SOLUTION INTRAVENOUS
Status: DISCONTINUED | OUTPATIENT
Start: 2024-11-25 | End: 2024-11-27 | Stop reason: HOSPADM

## 2024-11-25 RX ADMIN — INSULIN LISPRO 7 UNITS: 100 INJECTION, SOLUTION INTRAVENOUS; SUBCUTANEOUS at 22:12

## 2024-11-25 RX ADMIN — MINOCYCLINE HYDROCHLORIDE 200 MG: 50 CAPSULE ORAL at 21:15

## 2024-11-25 RX ADMIN — METHYLPREDNISOLONE SODIUM SUCCINATE 40 MG: 40 INJECTION, POWDER, FOR SOLUTION INTRAMUSCULAR; INTRAVENOUS at 17:43

## 2024-11-25 RX ADMIN — BUDESONIDE AND FORMOTEROL FUMARATE DIHYDRATE 2 PUFF: 160; 4.5 AEROSOL RESPIRATORY (INHALATION) at 07:04

## 2024-11-25 RX ADMIN — METHYLPREDNISOLONE SODIUM SUCCINATE 40 MG: 40 INJECTION, POWDER, FOR SOLUTION INTRAMUSCULAR; INTRAVENOUS at 05:24

## 2024-11-25 RX ADMIN — IPRATROPIUM BROMIDE 0.5 MG: 0.5 SOLUTION RESPIRATORY (INHALATION) at 06:51

## 2024-11-25 RX ADMIN — Medication 10 ML: at 21:15

## 2024-11-25 RX ADMIN — HYDROXYZINE HYDROCHLORIDE 25 MG: 25 TABLET ORAL at 21:14

## 2024-11-25 RX ADMIN — PANTOPRAZOLE SODIUM 40 MG: 40 TABLET, DELAYED RELEASE ORAL at 05:24

## 2024-11-25 RX ADMIN — IPRATROPIUM BROMIDE 0.5 MG: 0.5 SOLUTION RESPIRATORY (INHALATION) at 12:53

## 2024-11-25 RX ADMIN — APIXABAN 5 MG: 5 TABLET, FILM COATED ORAL at 08:12

## 2024-11-25 RX ADMIN — VANCOMYCIN HYDROCHLORIDE 1750 MG: 5 INJECTION, POWDER, LYOPHILIZED, FOR SOLUTION INTRAVENOUS at 11:50

## 2024-11-25 RX ADMIN — IPRATROPIUM BROMIDE 0.5 MG: 0.5 SOLUTION RESPIRATORY (INHALATION) at 00:26

## 2024-11-25 RX ADMIN — SODIUM CHLORIDE 75 ML/HR: 9 INJECTION, SOLUTION INTRAVENOUS at 04:05

## 2024-11-25 RX ADMIN — ATORVASTATIN CALCIUM 20 MG: 20 TABLET, FILM COATED ORAL at 08:12

## 2024-11-25 RX ADMIN — IPRATROPIUM BROMIDE 0.5 MG: 0.5 SOLUTION RESPIRATORY (INHALATION) at 18:58

## 2024-11-25 RX ADMIN — BUDESONIDE AND FORMOTEROL FUMARATE DIHYDRATE 2 PUFF: 160; 4.5 AEROSOL RESPIRATORY (INHALATION) at 18:58

## 2024-11-25 RX ADMIN — LEVOFLOXACIN 750 MG: 750 TABLET, FILM COATED ORAL at 21:14

## 2024-11-25 RX ADMIN — CEFEPIME 2000 MG: 2 INJECTION, POWDER, FOR SOLUTION INTRAVENOUS at 08:10

## 2024-11-25 RX ADMIN — APIXABAN 5 MG: 5 TABLET, FILM COATED ORAL at 21:14

## 2024-11-25 NOTE — THERAPY EVALUATION
Acute Care - Physical Therapy Initial Evaluation  ADIS Giordano     Patient Name: Mayra Hays  : 1945  MRN: [id number]  Today's Date: 2024   Onset of Illness/Injury or Date of Surgery: 24  Visit Dx:     ICD-10-CM ICD-9-CM   1. Sepsis with acute renal failure, due to unspecified organism, unspecified acute renal failure type, unspecified whether septic shock present  A41.9 038.9    R65.20 995.92    N17.9 584.9   2. Multifocal pneumonia  J18.9 486     Patient Active Problem List   Diagnosis    Chronic heart failure with preserved ejection fraction    Essential hypertension    Type 2 diabetes mellitus    Abnormal nuclear stress test with moderate to large size anteroapical and lateral wall myocardial ischemia.    Paroxysmal atrial fibrillation    Nocturnal hypoxia    Ventral hernia without obstruction or gangrene    Chronic obstructive pulmonary disease    Former smoker    Gait instability    Chronic respiratory failure with hypoxia    Cigarette nicotine dependence in remission    Acute anemia    Iron deficiency anemia    Malabsorption due to intolerance, not elsewhere classified    Lung mass    Other specified anemias    Primary cancer of right upper lobe of lung    Non-small cell cancer of right lung    Port-A-Cath in place    Hyperlipidemia LDL goal <100    Sepsis due to pneumonia    Septic shock due to undetermined organism     Past Medical History:   Diagnosis Date    Arthritis     CHF (congestive heart failure)     Chronic anticoagulation     Eliquis    Chronic kidney disease     stage II/IIIa    Collapsed lung     COPD (chronic obstructive pulmonary disease)     Diabetes mellitus     TYPE 2    Elevated cholesterol     GERD (gastroesophageal reflux disease)     Hyperlipidemia     Hypertension     Non-small cell lung cancer RUL     Sleep apnea     non compliant with CPAP    Stroke 2019    Wears eyeglasses      Past Surgical History:   Procedure Laterality Date    BRONCHOSCOPY Bilateral 2023     Procedure: BRONCHOSCOPY WITH ENDOBRONCHIAL ULTRASOUND;  Surgeon: Abdulkadir Peter MD;  Location:  [facility];  Service: Pulmonary;  Laterality: Bilateral;    BRONCHOSCOPY WITH ION ROBOTIC ASSIST N/A 9/6/2023    Procedure: BRONCHOSCOPY WITH ION ROBOT AND EBUS;  Surgeon: John Clemens MD;  Location:  [facility];  Service: Robotics - Pulmonary;  Laterality: N/A;  Ion cath #6 - 0032,  #6 - 0030, cath guide # 0077. EBUS scope removed with balloon intact.    CARDIAC CATHETERIZATION N/A 08/22/2017    Procedure: Left Heart Cath;  Surgeon: Jatinder Matias MD;  Location:  [facility];  Service:     CARDIAC CATHETERIZATION N/A 11/22/2021    Procedure: Left Heart Cath;  Surgeon: Tolu Steinberg MD;  [facility]:  [facility];  Service: Cardiology;  Laterality: N/A;    COLONOSCOPY      ENDOSCOPY      GALLBLADDER SURGERY      PORTACATH PLACEMENT N/A 9/29/2023    Procedure: INSERTION OF PORTACATH;  Surgeon: Petr Velásquez MD;  Location:  [facility];  Service: General;  Laterality: N/A;    VENTRAL HERNIA REPAIR N/A 05/14/2020    Procedure: VENTRAL HERNIA REPAIR LAPAROSCOPIC WITH DAVINCI ROBOT;  Surgeon: Petr Velásquez MD;  Location:  [facility];  Service: [place];  Laterality: N/A;     PT Assessment (Last 12 Hours)       PT Evaluation and Treatment       Row Name 11/25/24 1110          Physical Therapy Time and Intention    Subjective Information complains of;weakness;fatigue  -CT     Document Type evaluation  -CT     Mode of Treatment individual therapy;physical therapy  -CT     Patient Effort good  -CT     Symptoms Noted During/After Treatment fatigue  -CT     Comment Pt reports she lives at home with spouse and is independent household mobility. Pt is CGA for mobility at time of eval.  -CT       Row Name 11/25/24 1110          General Information    Patient Profile Reviewed yes  -CT     Onset of Illness/Injury or Date of Surgery 11/23/24  -CT     Referring Physician  Contreras  -CT     Patient Observations alert;cooperative;agree to therapy  -CT     Prior Level of Function independent:;all household mobility  -CT     Existing Precautions/Restrictions fall  -CT     Equipment Issued to Patient gait belt  -CT     Risks Reviewed patient:;LOB;nausea/vomiting;dizziness;increased discomfort;change in vital signs;increased drainage;lines disloged  -CT     Benefits Reviewed patient:;improve function;increase independence;increase strength;increase balance;decrease pain;decrease risk of DVT;improve skin integrity;increase knowledge  -CT       Row Name 11/25/24 1110          Living Environment    Current Living Arrangements home  -CT     People in Home spouse  -CT       Row Name 11/25/24 1110          Pain    Pain Side/Orientation generalized  -CT       Row Name 11/25/24 1110          Cognition    Affect/Mental Status (Cognition) WFL  -CT     Orientation Status (Cognition) oriented x 3  -CT     Follows Commands (Cognition) WFL  -CT       Row Name 11/25/24 1110          Range of Motion Comprehensive    Comment, General Range of Motion BLE grossly WFL  -CT       Row Name 11/25/24 1110          Strength Comprehensive (MMT)    Comment, General Manual Muscle Testing (MMT) Assessment BLE grossly 4-/5  -CT       Row Name 11/25/24 1110          Bed Mobility    Bed Mobility bed mobility (all) activities  -CT     Comment, (Bed Mobility) deferred pt up in bedside chair  -CT       Row Name 11/25/24 1110          Transfers    Transfers sit-stand transfer;stand-sit transfer  -CT       Row Name 11/25/24 1110          Sit-Stand Transfer    Sit-Stand [place] (Transfers) contact guard;minimum assist (75% patient effort)  -CT     Assistive Device (Sit-Stand Transfers) walker, front-wheeled  -CT       Row Name 11/25/24 1110          Stand-Sit Transfer    Stand-Sit [place] (Transfers) contact guard;minimum assist (75% patient effort)  -CT     Assistive Device (Stand-Sit Transfers) walker, front-wheeled   -CT       Row Name 11/25/24 1110          Gait/Stairs (Locomotion)    [place] Level (Gait) contact guard;minimum assist (75% patient effort)  -CT     Assistive Device (Gait) walker, front-wheeled  -CT     Patient was able to Ambulate yes  -CT     Distance in Feet (Gait) 14  -CT     Pattern (Gait) swing-to  -CT     Deviations/Abnormal Patterns (Gait) gait speed decreased;weight shifting decreased  -CT     Bilateral Gait Deviations forward flexed posture  -CT       Row Name 11/25/24 1110          Balance    Balance Assessment sitting static balance;sitting dynamic balance;standing static balance;standing dynamic balance  -CT     Static Sitting Balance set-up  -CT     Dynamic Sitting Balance contact guard;minimal assist  -CT     Position, Sitting Balance sitting in chair  -CT     Static Standing Balance contact guard  -CT     Position/Device Used, Standing Balance walker, front-wheeled  -CT       Row Name 11/25/24 1110          Coping    Observed Emotional State calm;cooperative  -CT     Verbalized Emotional State acceptance  -CT       Row Name 11/25/24 1110          Plan of Care Review    Plan of Care Reviewed With patient  -CT       Row Name 11/25/24 1110          Positioning and Restraints    Pre-Treatment Position sitting in chair/recliner  -CT     Post Treatment Position chair  -CT     In Chair sitting;call light within reach;encouraged to call for assist;exit alarm on;notified nsg  -CT       Row Name 11/25/24 1110          Therapy Assessment/Plan (PT)    Patient/Family Therapy Goals Statement (PT) Pt goals are to return to PLOF  -CT     Functional Level at Time of Evaluation (PT) Min A  -CT     PT Diagnosis (PT) decreased functional mobility  -CT     Rehab Potential (PT) good  -CT     Criteria for Skilled Interventions Met (PT) yes;skilled treatment is necessary  -CT     Therapy Frequency (PT) 2 times/wk  1-5 times/wk  -CT     Predicted Duration of Therapy Intervention (PT) length of stay  -CT       Row Name  11/25/24 1110          Therapy Plan Review/Discharge Plan (PT)    Therapy Plan Review (PT) evaluation/treatment results reviewed;care plan/treatment goals reviewed;risks/benefits reviewed;current/potential barriers reviewed;participants voiced agreement with care plan;participants included;patient  -CT       Row Name 11/25/24 1110          Physical Therapy Goals    Bed Mobility Goal Selection (PT) bed mobility, PT goal 1  -CT     Transfer Goal Selection (PT) transfer, PT goal 1  -CT     Gait Training Goal Selection (PT) gait training, PT goal 1  -CT       Row Name 11/25/24 1110          Transfer Goal 1 (PT)    Activity/Assistive Device (Transfer Goal 1, PT) sit-to-stand/stand-to-sit;bed-to-chair/chair-to-bed  -CT     [place] Level/Cues Needed (Transfer Goal 1, PT) standby assist  -CT     Time Frame (Transfer Goal 1, PT) by discharge  -CT       Row Name 11/25/24 1110          Gait Training Goal 1 (PT)    Activity/Assistive Device (Gait Training Goal 1, PT) gait (walking locomotion);assistive device use  -CT     [place] Level (Gait Training Goal 1, PT) standby assist  -CT     Distance (Gait Training Goal 1, PT) 70  -CT     Time Frame (Gait Training Goal 1, PT) by discharge  -CT               User Key  (r) = Recorded By, (t) = Taken By, (c) = Cosigned By      Initials Name Provider Type    CT Quin García, PT Physical Therapist                    Physical Therapy Education        No education to display                  PT Recommendation and Plan  Anticipated Discharge Disposition (PT): home with assist  Planned Therapy Interventions (PT): balance training, bed mobility training, gait training, home exercise program, manual therapy techniques, motor coordination training, neuromuscular re-education, patient/family education, postural re-education, strengthening, transfer training  Therapy Frequency (PT): 2 times/wk (1-5 times/wk)  Plan of Care Reviewed With: patient       Time Calculation:    PT Charges        Row Name 11/25/24 1511             Time Calculation    PT Received On 11/25/24  -CT      PT Goal Re-Cert Due Date 12/09/24  -CT                User Key  (r) = Recorded By, (t) = Taken By, (c) = Cosigned By      Initials Name Provider Type    CT Quin García, PT Physical Therapist                  Therapy Charges for Today       Code Description Service Date Service Provider Modifiers Qty    85972635529 HC PT EVAL MOD COMPLEXITY 4 11/25/2024 Quin García, PT GP 1            PT G-Codes  AM-PAC 6 Clicks Score (PT): 18    Quin García, PT  11/25/2024

## 2024-11-25 NOTE — PROGRESS NOTES
Pharmacokinetics Service Note:    Mayra Hays is a 79 y.o. female being managed by Pharmacy for vancomycin dosing.    Indication for Therapy: PNA  Current Regimen: intermittent dosing  Current Day of Therapy and Ordered Duration: Day 3  Level Reported and Timing with Respect to Previous Dose: 11.70 mcg/mll 34.25 hours post dose    Assessment/Plan: As vancomycin level is less than 20 mcg/ml will give another 1 time dose of 1750 mg.    Monitoring Planned: Will follow up with a random AM vancomycin level and redose as needed. Continue to monitor renal function.        Pharmacy Renal Dosing    Patient is currently on Cefepime for PNA. Scr today is 1.98 with a CrCl of 31 ml/min. Will dose adjust to q12h. Will continue to monitor renal function and make further adjustments as necessary.     Thanks,  Dolores Contreras, PharmD

## 2024-11-25 NOTE — PLAN OF CARE
Goal Outcome Evaluation:  Plan of Care Reviewed With: patient           Outcome Evaluation: Patient alert and oriented x 4, blood pressure better tonight, levophed remains off, bed alarm set, call light within reach, and VSS at this time.

## 2024-11-25 NOTE — PLAN OF CARE
Problem: Adult Inpatient Plan of Care  Goal: Plan of Care Review  Outcome: Progressing  Flowsheets (Taken 11/25/2024 1607)  Progress: improving  Outcome Evaluation: A&Ox4. 3LNC. Afib. Pt up in chair this shift, tolerated well. Plan of care ongoing.  Plan of Care Reviewed With: patient  Goal: Patient-Specific Goal (Individualized)  Outcome: Progressing  Goal: Absence of Hospital-Acquired Illness or Injury  Outcome: Progressing  Intervention: Identify and Manage Fall Risk  Recent Flowsheet Documentation  Taken 11/25/2024 1500 by Britt Junior RN  Safety Promotion/Fall Prevention: safety round/check completed  Taken 11/25/2024 1300 by Britt Junior RN  Safety Promotion/Fall Prevention: safety round/check completed  Taken 11/25/2024 1100 by Britt Junior RN  Safety Promotion/Fall Prevention: safety round/check completed  Taken 11/25/2024 0900 by Britt Junior RN  Safety Promotion/Fall Prevention: safety round/check completed  Taken 11/25/2024 0700 by Britt Junior RN  Safety Promotion/Fall Prevention: safety round/check completed  Intervention: Prevent Skin Injury  Recent Flowsheet Documentation  Taken 11/25/2024 0900 by Britt Junior RN  Skin Protection: incontinence pads utilized  Intervention: Prevent and Manage VTE (Venous Thromboembolism) Risk  Recent Flowsheet Documentation  Taken 11/25/2024 0900 by Britt Junior RN  VTE Prevention/Management: (eliquis) other (see comments)  Intervention: Prevent Infection  Recent Flowsheet Documentation  Taken 11/25/2024 1500 by Britt Junior RN  Infection Prevention:   rest/sleep promoted   single patient room provided  Taken 11/25/2024 1300 by Britt Junior RN  Infection Prevention:   rest/sleep promoted   single patient room provided  Taken 11/25/2024 1100 by Britt Junior RN  Infection Prevention:   rest/sleep promoted   single patient room provided  Taken 11/25/2024 0900 by Britt Junior RN  Infection Prevention:   rest/sleep promoted   single patient  room provided  Taken 11/25/2024 0700 by Britt Junior RN  Infection Prevention:   rest/sleep promoted   single patient room provided  Goal: Optimal Comfort and Wellbeing  Outcome: Progressing  Intervention: Provide Person-Centered Care  Recent Flowsheet Documentation  Taken 11/25/2024 1400 by Britt Junior RN  Trust Relationship/Rapport:   choices provided   care explained   emotional support provided   empathic listening provided   questions answered   questions encouraged   reassurance provided   thoughts/feelings acknowledged  Taken 11/25/2024 0900 by Britt Junior RN  Trust Relationship/Rapport:   care explained   choices provided   emotional support provided   empathic listening provided   questions answered   questions encouraged   reassurance provided   thoughts/feelings acknowledged  Goal: Readiness for Transition of Care  Outcome: Progressing     Problem: Skin Injury Risk Increased  Goal: Skin Health and Integrity  Outcome: Progressing  Intervention: Optimize Skin Protection  Recent Flowsheet Documentation  Taken 11/25/2024 0900 by Britt Junior RN  Activity Management:   activity encouraged   up in chair  Pressure Reduction Techniques:   heels elevated off bed   positioned off wounds   pressure points protected   weight shift assistance provided  Pressure Reduction Devices:   heel offloading device utilized   positioning supports utilized   pressure-redistributing mattress utilized   specialty bed utilized  Skin Protection: incontinence pads utilized  Goal: Skin Health and Integrity  Outcome: Progressing  Intervention: Optimize Skin Protection  Recent Flowsheet Documentation  Taken 11/25/2024 0900 by Britt Junior RN  Activity Management:   activity encouraged   up in chair  Pressure Reduction Techniques:   heels elevated off bed   positioned off wounds   pressure points protected   weight shift assistance provided  Pressure Reduction Devices:   heel offloading device utilized   positioning  supports utilized   pressure-redistributing mattress utilized   specialty bed utilized  Skin Protection: incontinence pads utilized     Problem: Sepsis/Septic Shock  Goal: Optimal Coping  Outcome: Progressing  Intervention: Support Patient and Family Response  Recent Flowsheet Documentation  Taken 11/25/2024 1400 by Britt Junior RN  Family/Support System Care: support provided  Taken 11/25/2024 0900 by Britt Junior RN  Supportive Measures: relaxation techniques promoted  Family/Support System Care: support provided  Goal: Absence of Bleeding  Outcome: Progressing  Goal: Blood Glucose Level Within Target Range  Outcome: Progressing  Intervention: Optimize Glycemic Control  Recent Flowsheet Documentation  Taken 11/25/2024 0900 by Britt Junior RN  Hyperglycemia Management: blood glucose monitored  Goal: Absence of Infection Signs and Symptoms  Outcome: Progressing  Intervention: Initiate Sepsis Management  Recent Flowsheet Documentation  Taken 11/25/2024 1500 by Britt Junior RN  Infection Prevention:   rest/sleep promoted   single patient room provided  Taken 11/25/2024 1300 by Britt Junior RN  Infection Prevention:   rest/sleep promoted   single patient room provided  Taken 11/25/2024 1100 by Britt Junior RN  Infection Prevention:   rest/sleep promoted   single patient room provided  Taken 11/25/2024 0900 by Britt Junior RN  Infection Prevention:   rest/sleep promoted   single patient room provided  Taken 11/25/2024 0700 by Britt Junior RN  Infection Prevention:   rest/sleep promoted   single patient room provided  Intervention: Promote Recovery  Recent Flowsheet Documentation  Taken 11/25/2024 0900 by Britt Junior RN  Activity Management:   activity encouraged   up in chair  Goal: Optimal Nutrition Delivery  Outcome: Progressing     Problem: Comorbidity Management  Goal: Blood Pressure in Desired Range  Outcome: Progressing  Intervention: Maintain Blood Pressure Management  Recent Flowsheet  Documentation  Taken 11/25/2024 1500 by Britt Junior RN  Medication Review/Management: medications reviewed  Taken 11/25/2024 1300 by Britt Junior RN  Medication Review/Management: medications reviewed  Taken 11/25/2024 1100 by Britt Junior RN  Medication Review/Management: medications reviewed  Taken 11/25/2024 0900 by Britt Junior RN  Medication Review/Management: medications reviewed  Taken 11/25/2024 0700 by Britt Junior RN  Medication Review/Management: medications reviewed   Goal Outcome Evaluation:  Plan of Care Reviewed With: patient        Progress: improving  Outcome Evaluation: A&Ox4. 3LNC. Afib. Pt up in chair this shift, tolerated well. Plan of care ongoing.

## 2024-11-26 LAB
ANION GAP SERPL CALCULATED.3IONS-SCNC: 10.4 MMOL/L (ref 5–15)
BUN SERPL-MCNC: 41 MG/DL (ref 8–23)
BUN/CREAT SERPL: 23.6 (ref 7–25)
CALCIUM SPEC-SCNC: 8.4 MG/DL (ref 8.6–10.5)
CHLORIDE SERPL-SCNC: 113 MMOL/L (ref 98–107)
CO2 SERPL-SCNC: 17.6 MMOL/L (ref 22–29)
CREAT SERPL-MCNC: 1.74 MG/DL (ref 0.57–1)
DEPRECATED RDW RBC AUTO: 46.5 FL (ref 37–54)
EGFRCR SERPLBLD CKD-EPI 2021: 29.7 ML/MIN/1.73
ERYTHROCYTE [DISTWIDTH] IN BLOOD BY AUTOMATED COUNT: 14.4 % (ref 12.3–15.4)
GLUCOSE BLDC GLUCOMTR-MCNC: 167 MG/DL (ref 70–130)
GLUCOSE BLDC GLUCOMTR-MCNC: 235 MG/DL (ref 70–130)
GLUCOSE BLDC GLUCOMTR-MCNC: 248 MG/DL (ref 70–130)
GLUCOSE BLDC GLUCOMTR-MCNC: 279 MG/DL (ref 70–130)
GLUCOSE SERPL-MCNC: 312 MG/DL (ref 65–99)
HCT VFR BLD AUTO: 33.4 % (ref 34–46.6)
HGB BLD-MCNC: 10.1 G/DL (ref 12–15.9)
MCH RBC QN AUTO: 26.9 PG (ref 26.6–33)
MCHC RBC AUTO-ENTMCNC: 30.2 G/DL (ref 31.5–35.7)
MCV RBC AUTO: 88.8 FL (ref 79–97)
PLATELET # BLD AUTO: 161 10*3/MM3 (ref 140–450)
PMV BLD AUTO: 11.3 FL (ref 6–12)
POTASSIUM SERPL-SCNC: 5.3 MMOL/L (ref 3.5–5.2)
RBC # BLD AUTO: 3.76 10*6/MM3 (ref 3.77–5.28)
SODIUM SERPL-SCNC: 141 MMOL/L (ref 136–145)
VANCOMYCIN SERPL-MCNC: 19.8 MCG/ML (ref 5–40)
WBC NRBC COR # BLD AUTO: 13.9 10*3/MM3 (ref 3.4–10.8)

## 2024-11-26 PROCEDURE — 85027 COMPLETE CBC AUTOMATED: CPT | Performed by: INTERNAL MEDICINE

## 2024-11-26 PROCEDURE — 94799 UNLISTED PULMONARY SVC/PX: CPT

## 2024-11-26 PROCEDURE — 97116 GAIT TRAINING THERAPY: CPT

## 2024-11-26 PROCEDURE — 94664 DEMO&/EVAL PT USE INHALER: CPT

## 2024-11-26 PROCEDURE — 99232 SBSQ HOSP IP/OBS MODERATE 35: CPT | Performed by: INTERNAL MEDICINE

## 2024-11-26 PROCEDURE — 97530 THERAPEUTIC ACTIVITIES: CPT

## 2024-11-26 PROCEDURE — 25010000002 METHYLPREDNISOLONE PER 40 MG: Performed by: STUDENT IN AN ORGANIZED HEALTH CARE EDUCATION/TRAINING PROGRAM

## 2024-11-26 PROCEDURE — 94761 N-INVAS EAR/PLS OXIMETRY MLT: CPT

## 2024-11-26 PROCEDURE — 80048 BASIC METABOLIC PNL TOTAL CA: CPT | Performed by: INTERNAL MEDICINE

## 2024-11-26 PROCEDURE — 80202 ASSAY OF VANCOMYCIN: CPT

## 2024-11-26 PROCEDURE — 82948 REAGENT STRIP/BLOOD GLUCOSE: CPT

## 2024-11-26 PROCEDURE — 87040 BLOOD CULTURE FOR BACTERIA: CPT | Performed by: INTERNAL MEDICINE

## 2024-11-26 PROCEDURE — 63710000001 INSULIN LISPRO (HUMAN) PER 5 UNITS: Performed by: INTERNAL MEDICINE

## 2024-11-26 RX ADMIN — METHYLPREDNISOLONE SODIUM SUCCINATE 40 MG: 40 INJECTION, POWDER, FOR SOLUTION INTRAMUSCULAR; INTRAVENOUS at 04:24

## 2024-11-26 RX ADMIN — IPRATROPIUM BROMIDE 0.5 MG: 0.5 SOLUTION RESPIRATORY (INHALATION) at 12:47

## 2024-11-26 RX ADMIN — Medication 10 ML: at 21:13

## 2024-11-26 RX ADMIN — IPRATROPIUM BROMIDE 0.5 MG: 0.5 SOLUTION RESPIRATORY (INHALATION) at 18:49

## 2024-11-26 RX ADMIN — HYDROXYZINE HYDROCHLORIDE 25 MG: 25 TABLET ORAL at 21:13

## 2024-11-26 RX ADMIN — BUDESONIDE AND FORMOTEROL FUMARATE DIHYDRATE 2 PUFF: 160; 4.5 AEROSOL RESPIRATORY (INHALATION) at 18:49

## 2024-11-26 RX ADMIN — INSULIN LISPRO 6 UNITS: 100 INJECTION, SOLUTION INTRAVENOUS; SUBCUTANEOUS at 16:56

## 2024-11-26 RX ADMIN — BUDESONIDE AND FORMOTEROL FUMARATE DIHYDRATE 2 PUFF: 160; 4.5 AEROSOL RESPIRATORY (INHALATION) at 07:19

## 2024-11-26 RX ADMIN — METHYLPREDNISOLONE SODIUM SUCCINATE 40 MG: 40 INJECTION, POWDER, FOR SOLUTION INTRAMUSCULAR; INTRAVENOUS at 16:57

## 2024-11-26 RX ADMIN — MINOCYCLINE HYDROCHLORIDE 200 MG: 50 CAPSULE ORAL at 08:17

## 2024-11-26 RX ADMIN — INSULIN LISPRO 4 UNITS: 100 INJECTION, SOLUTION INTRAVENOUS; SUBCUTANEOUS at 21:12

## 2024-11-26 RX ADMIN — ATORVASTATIN CALCIUM 20 MG: 20 TABLET, FILM COATED ORAL at 08:16

## 2024-11-26 RX ADMIN — PANTOPRAZOLE SODIUM 40 MG: 40 TABLET, DELAYED RELEASE ORAL at 06:30

## 2024-11-26 RX ADMIN — INSULIN LISPRO 4 UNITS: 100 INJECTION, SOLUTION INTRAVENOUS; SUBCUTANEOUS at 11:08

## 2024-11-26 RX ADMIN — IPRATROPIUM BROMIDE 0.5 MG: 0.5 SOLUTION RESPIRATORY (INHALATION) at 01:07

## 2024-11-26 RX ADMIN — APIXABAN 5 MG: 5 TABLET, FILM COATED ORAL at 21:13

## 2024-11-26 RX ADMIN — IPRATROPIUM BROMIDE 0.5 MG: 0.5 SOLUTION RESPIRATORY (INHALATION) at 07:19

## 2024-11-26 RX ADMIN — INSULIN LISPRO 2 UNITS: 100 INJECTION, SOLUTION INTRAVENOUS; SUBCUTANEOUS at 06:53

## 2024-11-26 RX ADMIN — APIXABAN 5 MG: 5 TABLET, FILM COATED ORAL at 08:16

## 2024-11-26 RX ADMIN — MINOCYCLINE HYDROCHLORIDE 200 MG: 50 CAPSULE ORAL at 22:18

## 2024-11-26 NOTE — PLAN OF CARE
Problem: Adult Inpatient Plan of Care  Goal: Plan of Care Review  Outcome: Not Progressing  Flowsheets (Taken 11/26/2024 1614)  Progress: no change  Outcome Evaluation: A&O. 3LNC. Mariela. Plan of care ongoing.  Plan of Care Reviewed With: patient  Goal: Patient-Specific Goal (Individualized)  Outcome: Not Progressing  Goal: Absence of Hospital-Acquired Illness or Injury  Outcome: Not Progressing  Intervention: Identify and Manage Fall Risk  Recent Flowsheet Documentation  Taken 11/26/2024 1500 by Britt Junior RN  Safety Promotion/Fall Prevention: safety round/check completed  Taken 11/26/2024 1300 by Britt Junior RN  Safety Promotion/Fall Prevention: safety round/check completed  Taken 11/26/2024 1100 by Britt Junior RN  Safety Promotion/Fall Prevention: safety round/check completed  Taken 11/26/2024 0900 by Britt Junior RN  Safety Promotion/Fall Prevention: safety round/check completed  Taken 11/26/2024 0700 by Britt Junior RN  Safety Promotion/Fall Prevention: safety round/check completed  Intervention: Prevent Skin Injury  Recent Flowsheet Documentation  Taken 11/26/2024 0800 by Britt Junior RN  Skin Protection: incontinence pads utilized  Intervention: Prevent and Manage VTE (Venous Thromboembolism) Risk  Recent Flowsheet Documentation  Taken 11/26/2024 0800 by Britt Junior RN  VTE Prevention/Management: (eliquis) other (see comments)  Intervention: Prevent Infection  Recent Flowsheet Documentation  Taken 11/26/2024 1500 by Britt Junior RN  Infection Prevention:   rest/sleep promoted   single patient room provided  Taken 11/26/2024 1300 by Britt Junior RN  Infection Prevention:   rest/sleep promoted   single patient room provided  Taken 11/26/2024 1100 by Britt Junior RN  Infection Prevention:   rest/sleep promoted   single patient room provided  Taken 11/26/2024 0900 by Britt Junior RN  Infection Prevention:   rest/sleep promoted   single patient room provided  Taken 11/26/2024  0700 by Britt Junior RN  Infection Prevention:   rest/sleep promoted   single patient room provided  Goal: Optimal Comfort and Wellbeing  Outcome: Not Progressing  Intervention: Provide Person-Centered Care  Recent Flowsheet Documentation  Taken 11/26/2024 0800 by Britt Junior RN  Trust Relationship/Rapport:   care explained   choices provided   empathic listening provided   emotional support provided   questions answered   questions encouraged   reassurance provided   thoughts/feelings acknowledged  Goal: Readiness for Transition of Care  Outcome: Not Progressing     Problem: Skin Injury Risk Increased  Goal: Skin Health and Integrity  Outcome: Not Progressing  Intervention: Optimize Skin Protection  Recent Flowsheet Documentation  Taken 11/26/2024 0800 by Britt Junior RN  Pressure Reduction Techniques: frequent weight shift encouraged  Pressure Reduction Devices: pressure-redistributing mattress utilized  Skin Protection: incontinence pads utilized  Goal: Skin Health and Integrity  Outcome: Not Progressing  Intervention: Optimize Skin Protection  Recent Flowsheet Documentation  Taken 11/26/2024 0800 by Britt Junior RN  Pressure Reduction Techniques: frequent weight shift encouraged  Pressure Reduction Devices: pressure-redistributing mattress utilized  Skin Protection: incontinence pads utilized     Problem: Sepsis/Septic Shock  Goal: Optimal Coping  Outcome: Not Progressing  Intervention: Support Patient and Family Response  Recent Flowsheet Documentation  Taken 11/26/2024 0800 by Britt Junior RN  Family/Support System Care: support provided  Goal: Absence of Bleeding  Outcome: Not Progressing  Goal: Blood Glucose Level Within Target Range  Outcome: Not Progressing  Intervention: Optimize Glycemic Control  Recent Flowsheet Documentation  Taken 11/26/2024 0800 by Britt Junior RN  Hyperglycemia Management: blood glucose monitored  Goal: Absence of Infection Signs and Symptoms  Outcome: Not  Progressing  Intervention: Initiate Sepsis Management  Recent Flowsheet Documentation  Taken 11/26/2024 1500 by Britt Junior RN  Infection Prevention:   rest/sleep promoted   single patient room provided  Taken 11/26/2024 1300 by Britt Junior RN  Infection Prevention:   rest/sleep promoted   single patient room provided  Taken 11/26/2024 1100 by Britt Junior RN  Infection Prevention:   rest/sleep promoted   single patient room provided  Taken 11/26/2024 0900 by Britt Junior RN  Infection Prevention:   rest/sleep promoted   single patient room provided  Taken 11/26/2024 0700 by Britt Junior RN  Infection Prevention:   rest/sleep promoted   single patient room provided  Goal: Optimal Nutrition Delivery  Outcome: Not Progressing     Problem: Comorbidity Management  Goal: Blood Pressure in Desired Range  Outcome: Not Progressing  Intervention: Maintain Blood Pressure Management  Recent Flowsheet Documentation  Taken 11/26/2024 1500 by Britt Junior RN  Medication Review/Management: medications reviewed  Taken 11/26/2024 1300 by Britt Junior RN  Medication Review/Management: medications reviewed  Taken 11/26/2024 1100 by Britt Junior RN  Medication Review/Management: medications reviewed  Taken 11/26/2024 0900 by Britt Junior RN  Medication Review/Management: medications reviewed  Taken 11/26/2024 0700 by Britt Junior RN  Medication Review/Management: medications reviewed   Goal Outcome Evaluation:  Plan of Care Reviewed With: patient        Progress: no change  Outcome Evaluation: A&O. 3LNC. Afib. Plan of care ongoing.

## 2024-11-26 NOTE — THERAPY TREATMENT NOTE
Acute Care - Physical Therapy Treatment Note   Pasquale     Patient Name: Mayra Hays  : 1945  MRN: [id number]  Today's Date: 2024   Onset of Illness/Injury or Date of Surgery: 24  Visit Dx:     ICD-10-CM ICD-9-CM   1. Sepsis with acute renal failure, due to unspecified organism, unspecified acute renal failure type, unspecified whether septic shock present  A41.9 038.9    R65.20 995.92    N17.9 584.9   2. Multifocal pneumonia  J18.9 486     Patient Active Problem List   Diagnosis    Chronic heart failure with preserved ejection fraction    Essential hypertension    Type 2 diabetes mellitus    Abnormal nuclear stress test with moderate to large size anteroapical and lateral wall myocardial ischemia.    Paroxysmal atrial fibrillation    Nocturnal hypoxia    Ventral hernia without obstruction or gangrene    Chronic obstructive pulmonary disease    Former smoker    Gait instability    Chronic respiratory failure with hypoxia    Cigarette nicotine dependence in remission    Acute anemia    Iron deficiency anemia    Malabsorption due to intolerance, not elsewhere classified    Lung mass    Other specified anemias    Primary cancer of right upper lobe of lung    Non-small cell cancer of right lung    Port-A-Cath in place    Hyperlipidemia LDL goal <100    Sepsis due to pneumonia    Septic shock due to undetermined organism     Past Medical History:   Diagnosis Date    Arthritis     CHF (congestive heart failure)     Chronic anticoagulation     Eliquis    Chronic kidney disease     stage II/IIIa    Collapsed lung     COPD (chronic obstructive pulmonary disease)     Diabetes mellitus     TYPE 2    Elevated cholesterol     GERD (gastroesophageal reflux disease)     Hyperlipidemia     Hypertension     Non-small cell lung cancer RUL     Sleep apnea     non compliant with CPAP    Stroke 2019    Wears eyeglasses      Past Surgical History:   Procedure Laterality Date    BRONCHOSCOPY Bilateral 2023     Procedure: BRONCHOSCOPY WITH ENDOBRONCHIAL ULTRASOUND;  Surgeon: Abdulkadir Peter MD;  Location:  [facility];  Service: Pulmonary;  Laterality: Bilateral;    BRONCHOSCOPY WITH ION ROBOTIC ASSIST N/A 9/6/2023    Procedure: BRONCHOSCOPY WITH ION ROBOT AND EBUS;  Surgeon: John Clemens MD;  Location:  [facility];  Service: Robotics - Pulmonary;  Laterality: N/A;  Ion cath #6 - 0032,  #6 - 0030, cath guide # 0077. EBUS scope removed with balloon intact.    CARDIAC CATHETERIZATION N/A 08/22/2017    Procedure: Left Heart Cath;  Surgeon: Jatinder Matias MD;  [facility]:  [facility];  Service:     CARDIAC CATHETERIZATION N/A 11/22/2021    Procedure: Left Heart Cath;  Surgeon: Tolu Steinberg MD;  [facility]:  [facility];  Service: Cardiology;  Laterality: N/A;    COLONOSCOPY      ENDOSCOPY      GALLBLADDER SURGERY      PORTACATH PLACEMENT N/A 9/29/2023    Procedure: INSERTION OF PORTACATH;  Surgeon: Petr Velásquez MD;  Location:  [facility];  Service: General;  Laterality: N/A;    VENTRAL HERNIA REPAIR N/A 05/14/2020    Procedure: VENTRAL HERNIA REPAIR LAPAROSCOPIC WITH DAVINCI ROBOT;  Surgeon: Petr Velásquez MD;  Location:  [facility];  Service: DaVinci;  Laterality: N/A;     PT Assessment (Last 12 Hours)       PT Evaluation and Treatment       Row Name 11/26/24 1121          Physical Therapy Time and Intention    Subjective Information complains of;weakness;fatigue  -CT     Document Type therapy note (daily note)  -CT     Mode of Treatment individual therapy;physical therapy  -CT     Patient Effort good  -CT       Row Name 11/26/24 1121          General Information    Patient Profile Reviewed yes  -CT     Existing Precautions/Restrictions fall  -CT       Row Name 11/26/24 1121          Pain    Pain Side/Orientation generalized  -CT       Row Name 11/26/24 1121          Cognition    Affect/Mental Status (Cognition) WFL  -CT     Orientation Status (Cognition) oriented  x 3  -CT     Follows Commands (Cognition) WFL  -CT       Row Name 11/26/24 1121          Bed Mobility    Bed Mobility bed mobility (all) activities  -CT       Row Name 11/26/24 1121          Transfers    Transfers sit-stand transfer;stand-sit transfer  -CT       Row Name 11/26/24 1121          Sit-Stand Transfer    Sit-Stand [place] (Transfers) contact guard;minimum assist (75% patient effort)  -CT     Assistive Device (Sit-Stand Transfers) walker, front-wheeled  -CT       Row Name 11/26/24 1121          Stand-Sit Transfer    Stand-Sit [place] (Transfers) contact guard;minimum assist (75% patient effort)  -CT     Assistive Device (Stand-Sit Transfers) walker, front-wheeled  -CT       Row Name 11/26/24 1121          Gait/Stairs (Locomotion)    [place] Level (Gait) contact guard;minimum assist (75% patient effort)  -CT     Assistive Device (Gait) walker, front-wheeled  -CT     Patient was able to Ambulate yes  -CT     Distance in Feet (Gait) 30  -CT     Pattern (Gait) swing-to  -CT     Deviations/Abnormal Patterns (Gait) gait speed decreased;weight shifting decreased  -CT     Bilateral Gait Deviations forward flexed posture  -CT       Row Name 11/26/24 1121          Coping    Observed Emotional State calm;cooperative  -CT     Verbalized Emotional State acceptance  -CT       Row Name 11/26/24 1121          Plan of Care Review    Plan of Care Reviewed With patient  -CT       Row Name 11/26/24 1121          Positioning and Restraints    Pre-Treatment Position sitting in chair/recliner  -CT     Post Treatment Position chair  -CT     In Chair sitting;call light within reach;encouraged to call for assist;exit alarm on  -CT       Row Name 11/26/24 1121          Therapy Assessment/Plan (PT)    Rehab Potential (PT) good  -CT     Criteria for Skilled Interventions Met (PT) yes;skilled treatment is necessary  -CT     Therapy Frequency (PT) 2 times/wk  1-5 times/wk  -CT               User Key  (r) = Recorded By,  (t) = Taken By, (c) = Cosigned By      Initials Name Provider Type    CT Quin García, PT Physical Therapist                    Physical Therapy Education        No education to display                  PT Recommendation and Plan  Anticipated Discharge Disposition (PT): home with assist  Planned Therapy Interventions (PT): balance training, bed mobility training, gait training, home exercise program, manual therapy techniques, motor coordination training, neuromuscular re-education, patient/family education, postural re-education, strengthening, transfer training  Therapy Frequency (PT): 2 times/wk (1-5 times/wk)  Plan of Care Reviewed With: patient       Time Calculation:    PT Charges       Row Name 11/26/24 1124             Time Calculation    PT Received On 11/26/24  -CT         Time Calculation- PT    Total Timed Code Minutes- PT 28 minute(s)  -CT                User Key  (r) = Recorded By, (t) = Taken By, (c) = Cosigned By      Initials Name Provider Type    CT Quin García, PT Physical Therapist                  Therapy Charges for Today       Code Description Service Date Service Provider Modifiers Qty    15970662168 HC PT EVAL MOD COMPLEXITY 4 11/25/2024 Quni García, PT GP 1    75557450163 HC GAIT TRAINING EA 15 MIN 11/26/2024 Quin García, PT GP 1    50667237815 HC PT THERAPEUTIC ACT EA 15 MIN 11/26/2024 Quin García, PT GP 1            PT G-Codes  AM-PAC 6 Clicks Score (PT): 18    Quin García, PT  11/26/2024

## 2024-11-26 NOTE — PROGRESS NOTES
Antimicrobial Length of Therapy:    Day 2 of 7 levofloxacin  Day 2 of 7 minocycline    Thank you.  Richa Patino, Pharm.D.  11/26/2024  13:54 EST

## 2024-11-26 NOTE — CONSULTS
COPD Education        NAME:___Mayra Hays  :_1945_  Sex: female  ____24___11:39 EST___________    COPD Education Evaluation            COPD Education Completed (See Note) Yes     Referring/consulting Provider: Dr. Portia Contreras     Reason for Consultation:  COPD Exacerbation   Current Outpatient Pulmonologist     [facility] [place] [facility]     Last Pulmonology Visit 2024         Subjective .     Age: 79 y.o.    What stage have you been told you are in? unknown  Do you use a CPAP or BIPAP? no   Have you had any recent weight loss? no  How many pillows do you use? 1  Do you have dyspnea? yes Dyspnea on exertion  Home O2: 2L NC      Patient's Last ABG:  Site   Date Value Ref Range Status   2024 Right Brachial  Final     Gonzalo's Test   Date Value Ref Range Status   2024 N/A  Final     pH, Arterial   Date Value Ref Range Status   2024 7.327 (L) 7.350 - 7.450 pH units Final     Comment:     84 Value below reference range     pCO2, Arterial   Date Value Ref Range Status   2024 38.6 35.0 - 45.0 mm Hg Final     pO2, Arterial   Date Value Ref Range Status   2024 57.3 (L) 83.0 - 108.0 mm Hg Final     Comment:     84 Value below reference range     HCO3, Arterial   Date Value Ref Range Status   2024 20.2 20.0 - 26.0 mmol/L Final     Base Excess, Arterial   Date Value Ref Range Status   2024 -5.3 (L) 0.0 - 2.0 mmol/L Final     O2 Saturation, Arterial   Date Value Ref Range Status   2024 87.4 (L) 94.0 - 99.0 % Final     Comment:     84 Value below reference range     Hemoglobin, Blood Gas   Date Value Ref Range Status   2024 13.5 13.5 - 17.5 g/dL Final     Comment:     84 Value below reference range     Hematocrit, Blood Gas   Date Value Ref Range Status   2024 41.3 38.0 - 51.0 % Final     Oxyhemoglobin   Date Value Ref Range Status   2024 86.0 (L) 94 - 99 % Final     Comment:     84 Value below reference range     Methemoglobin   Date Value Ref  Range Status   2024 0.10 0.00 - 3.00 % Final     Carboxyhemoglobin   Date Value Ref Range Status   2024 1.5 0 - 5 % Final     CO2 Content   Date Value Ref Range Status   2024 21.4 (L) 22 - 33 mmol/L Final     Barometric Pressure for Blood Gas   Date Value Ref Range Status   2024 728 mmHg Final     Modality   Date Value Ref Range Status   2024 Room Air  Final     FIO2   Date Value Ref Range Status   2024 21 % Final        Social History:  Social History     Socioeconomic History    Marital status: Single    Number of children: 6   Tobacco Use    Smoking status: Former     Current packs/day: 0.00     Average packs/day: 3.0 packs/day for 16.7 years (50.2 ttl pk-yrs)     Types: Cigarettes     Start date: 4/3/1998     Quit date: 2015     Years since quittin.9     Passive exposure: Past    Smokeless tobacco: Never   Vaping Use    Vaping status: Never Used   Substance and Sexual Activity    Alcohol use: No    Drug use: No    Sexual activity: Never           Classification of Airflow Limitation Severity in COPD (Based on Post-Bronchodilator FEV1)  Gold 1: Mild FEV1 >= 80% predicted   Gold 2:  Moderate 50% <= FEV1 < 80% predicted   Gold 3: Severe 30% >= FEV1 < 50% predicted   Gold 4: Very Severe FEV1 < 30% predicted     Unable to stage for COPD per GOLD guidelines due to FEV1/FVC being greater than 70.    Objective     SpO2 SpO2: 99 % (24 0900)  Device Device (Oxygen Therapy): nasal cannula (24 0800)  Flow Flow (L/min) (Oxygen Therapy): 3 (24 0800)        Home Medications:  Medications Prior to Admission   Medication Sig Dispense Refill Last Dose/Taking    apixaban (ELIQUIS) 5 MG tablet tablet Take 1 tablet by mouth Every 12 (Twelve) Hours. 180 tablet 3 2024    atorvastatin (LIPITOR) 20 MG tablet Take 1 tablet by mouth Daily.   2024    Budeson-Glycopyrrol-Formoterol (Breztri Aerosphere) 160-9-4.8 MCG/ACT aerosol inhaler Inhale 2 puffs by mouth 2 (Two)  "Times a Day. 10.7 g 6 11/22/2024    esomeprazole (nexIUM) 20 MG capsule Take 1 capsule by mouth Daily.   11/22/2024    hydrOXYzine pamoate (VISTARIL) 25 MG capsule Take 1 capsule by mouth Every Night.   11/22/2024    lisinopril (PRINIVIL,ZESTRIL) 10 MG tablet Take 1 tablet by mouth Daily. 30 tablet 2 11/22/2024    albuterol sulfate  (90 Base) MCG/ACT inhaler Inhale 2 puffs by mouth Every 4 (Four) Hours As Needed for Wheezing. 18 g 6 Unknown    lidocaine-prilocaine (EMLA) 2.5-2.5 % cream Apply to port-a-cath site 30 minutes prior to arrival at infusion center. Cover with plastic wrap. 30 g 1 Unknown    metFORMIN (GLUCOPHAGE) 500 MG tablet Take 1 tablet by mouth 2 (Two) Times a Day As Needed (only takes if blood glucose is above 200). As needed   Unknown     Barriers to Learning? No    COPD education given via the booklet \"A Patient's Guide to COPD\".     COPD Zones: (Green/yellow/Red): YES     Exacerbation or Flare up signs and symptoms: YES     Causes of COPD Exacerbation:      Lung Infection YES     Indoor and Outdoor Irratants YES     COPD Medication Non-Compliance YES     Healthy eating and drinking habbits: YES     Exercise and Activity: YES     Managing Medications: YES     Patient understands use of Rescue Medications: YES and Albuterol MDI, Duoneb nebulizer       Patient understands use of Maintenance Medications: YES and Breztri       Proper MDI technique (w/wo Spacer): YES and Patient states she uses a Spacer each time she uses her Breztri.        How to use a nebulizer: YES     How to clean a nebulizer: YES     Breathing Techniques:       Purse-lipped breathing YES     Oxygen therapy SAFETY:  YES               Goals Discussed with patient: Keep home smoke free., Take medications as ordered., GO to Dr. braga., Increase activity., Eat healthier., Increase use of pursed lip breathing., Decrease flare-ups., and Increase COPD Knowledge.      Comments: COPD education was given to Ms. Hays via the " "booklet , \"A Patient's Guide to COPD\". Discussion of the COPD zones (Green/Yellow/Red) was competed with emphasizes on what to do in the yellow and red zones. She was in her room and pleasantly interested in COPD education.      Proper Inhaler technique was illustrated with and without the given Aero Chamber spacer I provided to the patient. Instruction on rescue and maintenance medications, the function of each and the importance of using them as prescribed.      Pursed Lip breathing was instructed as well as when to utilize the breathing technique.     CHELSI Delgado, RRT  COPD Navigator  11/26/24  11:39 EST   "

## 2024-11-26 NOTE — PROGRESS NOTES
[facility]     Patient Identification:  Name:  Mayra Hays  Age:  79 y.o.  Sex:  female  :  1945  MRN:  [id number]  Visit Number:  [id number]  Primary Care Provider:  Niki Antony APRN    Length of stay:  2    Chief complaint: Generalized weakness    Subjective:    Patient seen and examined at bedside with no nursing staff present.  Patient states she is feeling improved but still weak with general fatigue and malaise.  Have encouraged patient to ambulate with nursing staff.  Otherwise, no new events overnight.  ----------------------------------------------------------------------------------------------------------------------  Current Hospital Meds:  apixaban, 5 mg, Oral, Q12H  atorvastatin, 20 mg, Oral, Daily  budesonide-formoterol, 2 puff, Inhalation, BID - RT   And  ipratropium, 0.5 mg, Nebulization, Q6H - RT  hydrOXYzine, 25 mg, Oral, Nightly  levoFLOXacin, 750 mg, Oral, Q48H  methylPREDNISolone sodium succinate, 40 mg, Intravenous, Q12H  minocycline, 200 mg, Oral, Q12H  pantoprazole, 40 mg, Oral, Q AM  sodium chloride, 10 mL, Intravenous, Q12H  sodium chloride, 10 mL, Intravenous, Q12H      norepinephrine, 0.02-0.3 mcg/kg/min, Last Rate: Stopped (24 1115)      ----------------------------------------------------------------------------------------------------------------------  Vital Signs:  Temp:  [97 °F (36.1 °C)-98.7 °F (37.1 °C)] 98.1 °F (36.7 °C)  Heart Rate:  [] 78  Resp:  [-] 18  BP: ()/(55-80) 132/62      24  1352 24  0400 24  0400   Weight: 95.3 kg (210 lb) 106 kg (234 lb 5.6 oz) 113 kg (249 lb 1.9 oz)     Body mass index is 36.79 kg/m².    Intake/Output Summary (Last 24 hours) at 2024  Last data filed at 2024 1808  Gross per 24 hour   Intake 3256.2 ml   Output 1200 ml   Net 2056.2 ml     Diet: Regular/House; Fluid Consistency: Thin (IDDSI  0)  ----------------------------------------------------------------------------------------------------------------------  Physical exam:  Constitutional: Well-nourished  female in no apparent distress.     HENT:  Head:  Normocephalic and atraumatic.  Mouth:  Moist mucous membranes.    Eyes:  Conjunctivae and EOM are normal.  Pupils are equal, round, and reactive to light.  No scleral icterus.    Neck:  Neck supple. No thyromegaly.  No JVD present.    Cardiovascular:  Regular rate and rhythm with no murmurs, rubs, clicks or gallops appreciated.  Pulmonary/Chest:  Clear to auscultation bilaterally with no crackles, wheezes or rhonchi appreciated.  Abdominal:  Soft. Nontender. Nondistended  Bowel sounds are normal in all four quadrants. No organomegally appreciated.   Musculoskeletal:   No edema, no tenderness, and no deformity.  No red or swollen joints anywhere.    Neurological:  Alert, Cranial nerves II-XII intact with no focal deficits.  No facial droop.  No slurred speech.   Skin:  Warm and dry to palpation with no rashes or lesions appreciated.  Peripheral vascular:  2+ radial and pedal pulses in bilateral upper and lower extremities.  Psychiatric:  Alert and oriented x3, demonstrates appropriate judgment and insight.  ---------------------------------------------------------------------------------------  ----------------------------------------------------------------------------------------------------------------------  Results from last 7 days   Lab Units 11/23/24 1714 11/23/24  1416   HSTROP T ng/L 20* 27*     Results from last 7 days   Lab Units 11/25/24  0152 11/23/24 2318 11/23/24 2003 11/23/24 1707 11/23/24  1416   CRP mg/dL 28.35*  --   --   --  18.67*   LACTATE mmol/L  --  1.5 2.8* 4.0* 4.5*   WBC 10*3/mm3 17.99* 26.67*  --   --  33.38*   HEMOGLOBIN g/dL 10.3* 10.3*  --   --  13.7   HEMATOCRIT % 34.3 34.1  --   --  44.0   MCV fL 89.3 88.6  --   --  86.1   MCHC g/dL 30.0* 30.2*  --   --   "31.1*   PLATELETS 10*3/mm3 152 169  --   --  253   INR   --   --   --   --  1.12*     Results from last 7 days   Lab Units 11/23/24  1446   PH, ARTERIAL pH units 7.327*   PO2 ART mm Hg 57.3*   PCO2, ARTERIAL mm Hg 38.6   HCO3 ART mmol/L 20.2     Results from last 7 days   Lab Units 11/25/24  0152 11/23/24  2318 11/23/24  1416 11/21/24  1434   SODIUM mmol/L 141 141 140 137   POTASSIUM mmol/L 4.8 4.3 4.3 4.2   MAGNESIUM mg/dL  --   --  1.9  --    CHLORIDE mmol/L 110* 105 100 97*   CO2 mmol/L 17.2* 20.7* 19.3* 27.7   BUN mg/dL 42* 50* 47* 36*   CREATININE mg/dL 1.98* 2.98* 3.26* 1.35*   CALCIUM mg/dL 8.0* 8.3* 9.8 9.6   GLUCOSE mg/dL 226* 157* 194* 281*   ALBUMIN g/dL  --   --  4.3 4.2   BILIRUBIN mg/dL  --   --  0.8 0.4   ALK PHOS U/L  --   --  112 112   AST (SGOT) U/L  --   --  19 13   ALT (SGPT) U/L  --   --  17 10   Estimated Creatinine Clearance: 31.4 mL/min (A) (by C-G formula based on SCr of 1.98 mg/dL (H)).    No results found for: \"AMMONIA\"      Blood Culture   Date Value Ref Range Status   11/23/2024 No growth at 2 days  Preliminary   11/23/2024 Abnormal Stain (C)  Preliminary     No results found for: \"URINECX\"  No results found for: \"WOUNDCX\"  No results found for: \"STOOLCX\"    I have personally looked at the labs and they are summarized above.  ----------------------------------------------------------------------------------------------------------------------  Imaging Results (Last 24 Hours)       Procedure Component Value Units Date/Time    CT Abdomen Pelvis Without Contrast [[id number]] Collected: 11/23/24 1628     Updated: 11/25/24 1611    Narrative:      VERIFICATION OBSERVER NAME: Claudio Rodgers MD.     Technique: Axial images were obtained along with coronal and sagittal  reconstruction. DLP in mGycm reported in the EMR records. Dose lowering  technique: Automated exposure control with adjustment of the MA and/or  KV, use of iterative reconstruction. Data included in the medical  records.   "   HISTORY/COMPARISON/FINDINGS:     Comparison: None.     No hilar or mediastinal adenopathy.  Large consolidation RIGHT upper lobe with air bronchogram grams  consistent with pneumonia.  No mediastinal adenopathy.  Minimal cardiac enlargement.  No pericardial effusion.  Scattered nodules and extensive consolidation RIGHT lower lung field  consistent with pneumonia.  LEFT lung is clear.  No pleural effusion.  Small hiatal hernia.     Liver and spleen appeared unremarkable.  Thickened LEFT adrenal gland likely due to multiple nodules or  hyperplasia.  Post cholecystectomy.  Fatty infiltration of the pancreas.  No  inflammatory or infectious process.  No kidney stones or hydronephrosis.    No evidence of bowel obstruction.  Urinary bladder is unremarkable.  Female pelvic organs are normal.  Normal appendix.  Degenerative changes at L5-S1.       Impression:      Multifocal pneumonia in the chest.  No acute process noted in the abdomen or pelvis.                             [facility], Zip code [id number].        This report was finalized on 11/23/2024 4:34 PM by Dr. Claudio Rodgers MD.       CT Chest Without Contrast Diagnostic [[id number]] Collected: 11/23/24 1628     Updated: 11/25/24 1611    Narrative:      VERIFICATION OBSERVER NAME: Claudio Rodgers MD.     Technique: Axial images were obtained along with coronal and sagittal  reconstruction. DLP in mGycm reported in the EMR records. Dose lowering  technique: Automated exposure control with adjustment of the MA and/or  KV, use of iterative reconstruction. Data included in the medical  records.     HISTORY/COMPARISON/FINDINGS:     Comparison: None.     No hilar or mediastinal adenopathy.  Large consolidation RIGHT upper lobe with air bronchogram grams  consistent with pneumonia.  No mediastinal adenopathy.  Minimal cardiac enlargement.  No pericardial effusion.  Scattered nodules and extensive consolidation RIGHT lower lung field  consistent with pneumonia.  LEFT lung is  clear.  No pleural effusion.  Small hiatal hernia.     Liver and spleen appeared unremarkable.  Thickened LEFT adrenal gland likely due to multiple nodules or  hyperplasia.  Post cholecystectomy.  Fatty infiltration of the pancreas.  No  inflammatory or infectious process.  No kidney stones or hydronephrosis.    No evidence of bowel obstruction.  Urinary bladder is unremarkable.  Female pelvic organs are normal.  Normal appendix.  Degenerative changes at L5-S1.       Impression:      Multifocal pneumonia in the chest.  No acute process noted in the abdomen or pelvis.                             [facility], Zip code [id number].        This report was finalized on 11/23/2024 4:34 PM by Dr. Claudio Rodgers MD.             ----------------------------------------------------------------------------------------------------------------------  Assessment and Plan:    Septic shock -initially required Levophed to maintain MAP greater than 65.  Levophed has now been discontinued.  Septic shock slowly improving.  Secondary to multifocal pneumonia.    2.  Multifocal pneumonia -patient with history of non-small cell lung cancer, cannot exclude postobstructive pneumonia.  Will continue empiric antibiotic therapy to treat for both gram-negative organisms and MRSA.  As such, we will continue cefepime and vancomycin.    3.  Immunocompromised -patient is immunocompromised secondary to current chemotherapy regimen.  Will continue empiric antibiotic therapy at this time.    4.  Acute on chronic hypoxic respiratory failure -slowly improving, continue supplemental oxygen to maintain O2 saturation greater than 90%.    5.  COPD exacerbation -continue Solu-Medrol and DuoNebs    6.  Acute kidney injury -serum creatinine much improved from 2.9 down to 1.9.  Will repeat BMP in the morning    7.  Paroxysmal A-fib -continue Eliquis    8.  Essential hypertension -continue to hold all antihypertensive medications at this time in the setting of recent  septic shock requiring vasopressors    9.  Type 2 diabetes mellitus -continue Accu-Cheks before every meal and nightly with sliding scale insulin    10.  Chronic HFpEF -no evidence of acute exacerbation at this time    Disposition possible discharge in the next 24 to 48 hours    Arnulfo Carty,    11/25/24   20:21 EST

## 2024-11-26 NOTE — PLAN OF CARE
Goal Outcome Evaluation:                      Goal outcome eval: PT AOX4, VSS, 3 L NC. no acute changes over shift, continuing poc.

## 2024-11-27 ENCOUNTER — READMISSION MANAGEMENT (OUTPATIENT)
Dept: CALL CENTER | Facility: HOSPITAL | Age: 79
End: 2024-11-27
Payer: MEDICARE

## 2024-11-27 VITALS
RESPIRATION RATE: 18 BRPM | OXYGEN SATURATION: 95 % | HEIGHT: 67 IN | WEIGHT: 216.78 LBS | SYSTOLIC BLOOD PRESSURE: 167 MMHG | HEART RATE: 85 BPM | BODY MASS INDEX: 34.02 KG/M2 | DIASTOLIC BLOOD PRESSURE: 90 MMHG | TEMPERATURE: 97.7 F

## 2024-11-27 DIAGNOSIS — C34.11 PRIMARY CANCER OF RIGHT UPPER LOBE OF LUNG: ICD-10-CM

## 2024-11-27 DIAGNOSIS — C34.91 NON-SMALL CELL CANCER OF RIGHT LUNG: Primary | ICD-10-CM

## 2024-11-27 PROBLEM — R65.21 SEPTIC SHOCK DUE TO UNDETERMINED ORGANISM: Status: RESOLVED | Noted: 2024-11-23 | Resolved: 2024-11-27

## 2024-11-27 PROBLEM — A41.9 SEPTIC SHOCK DUE TO UNDETERMINED ORGANISM: Status: RESOLVED | Noted: 2024-11-23 | Resolved: 2024-11-27

## 2024-11-27 LAB
ANION GAP SERPL CALCULATED.3IONS-SCNC: 11.6 MMOL/L (ref 5–15)
BACTERIA SPEC RESP CULT: NORMAL
BUN SERPL-MCNC: 41 MG/DL (ref 8–23)
BUN/CREAT SERPL: 30.6 (ref 7–25)
CALCIUM SPEC-SCNC: 8.7 MG/DL (ref 8.6–10.5)
CHLORIDE SERPL-SCNC: 110 MMOL/L (ref 98–107)
CO2 SERPL-SCNC: 18.4 MMOL/L (ref 22–29)
CREAT SERPL-MCNC: 1.34 MG/DL (ref 0.57–1)
DEPRECATED RDW RBC AUTO: 47.9 FL (ref 37–54)
EGFRCR SERPLBLD CKD-EPI 2021: 40.7 ML/MIN/1.73
ERYTHROCYTE [DISTWIDTH] IN BLOOD BY AUTOMATED COUNT: 14.5 % (ref 12.3–15.4)
GLUCOSE BLDC GLUCOMTR-MCNC: 204 MG/DL (ref 70–130)
GLUCOSE BLDC GLUCOMTR-MCNC: 259 MG/DL (ref 70–130)
GLUCOSE BLDC GLUCOMTR-MCNC: 309 MG/DL (ref 70–130)
GLUCOSE SERPL-MCNC: 192 MG/DL (ref 65–99)
GRAM STN SPEC: NORMAL
HCT VFR BLD AUTO: 35.3 % (ref 34–46.6)
HGB BLD-MCNC: 10.7 G/DL (ref 12–15.9)
MCH RBC QN AUTO: 27.4 PG (ref 26.6–33)
MCHC RBC AUTO-ENTMCNC: 30.3 G/DL (ref 31.5–35.7)
MCV RBC AUTO: 90.3 FL (ref 79–97)
PLATELET # BLD AUTO: 185 10*3/MM3 (ref 140–450)
PMV BLD AUTO: 12 FL (ref 6–12)
POTASSIUM SERPL-SCNC: 5.6 MMOL/L (ref 3.5–5.2)
RBC # BLD AUTO: 3.91 10*6/MM3 (ref 3.77–5.28)
SODIUM SERPL-SCNC: 140 MMOL/L (ref 136–145)
WBC NRBC COR # BLD AUTO: 9.81 10*3/MM3 (ref 3.4–10.8)

## 2024-11-27 PROCEDURE — 85027 COMPLETE CBC AUTOMATED: CPT | Performed by: INTERNAL MEDICINE

## 2024-11-27 PROCEDURE — 94664 DEMO&/EVAL PT USE INHALER: CPT

## 2024-11-27 PROCEDURE — 94799 UNLISTED PULMONARY SVC/PX: CPT

## 2024-11-27 PROCEDURE — 94761 N-INVAS EAR/PLS OXIMETRY MLT: CPT

## 2024-11-27 PROCEDURE — 25010000002 METHYLPREDNISOLONE PER 40 MG: Performed by: STUDENT IN AN ORGANIZED HEALTH CARE EDUCATION/TRAINING PROGRAM

## 2024-11-27 PROCEDURE — 63710000001 INSULIN LISPRO (HUMAN) PER 5 UNITS: Performed by: INTERNAL MEDICINE

## 2024-11-27 PROCEDURE — 80048 BASIC METABOLIC PNL TOTAL CA: CPT | Performed by: INTERNAL MEDICINE

## 2024-11-27 PROCEDURE — 82948 REAGENT STRIP/BLOOD GLUCOSE: CPT

## 2024-11-27 PROCEDURE — 99239 HOSP IP/OBS DSCHRG MGMT >30: CPT | Performed by: INTERNAL MEDICINE

## 2024-11-27 RX ORDER — LEVOFLOXACIN 750 MG/1
750 TABLET, FILM COATED ORAL
Qty: 7 TABLET | Refills: 0 | Status: SHIPPED | OUTPATIENT
Start: 2024-11-27 | End: 2024-12-11

## 2024-11-27 RX ORDER — MINOCYCLINE HYDROCHLORIDE 100 MG/1
200 CAPSULE ORAL EVERY 12 HOURS SCHEDULED
Qty: 52 CAPSULE | Refills: 0 | Status: SHIPPED | OUTPATIENT
Start: 2024-11-27 | End: 2024-12-10

## 2024-11-27 RX ORDER — SODIUM CHLORIDE 9 MG/ML
250 INJECTION, SOLUTION INTRAVENOUS ONCE
OUTPATIENT
Start: 2024-12-19

## 2024-11-27 RX ADMIN — ATORVASTATIN CALCIUM 20 MG: 20 TABLET, FILM COATED ORAL at 09:10

## 2024-11-27 RX ADMIN — Medication 10 ML: at 09:10

## 2024-11-27 RX ADMIN — IPRATROPIUM BROMIDE 0.5 MG: 0.5 SOLUTION RESPIRATORY (INHALATION) at 06:23

## 2024-11-27 RX ADMIN — INSULIN LISPRO 6 UNITS: 100 INJECTION, SOLUTION INTRAVENOUS; SUBCUTANEOUS at 11:52

## 2024-11-27 RX ADMIN — APIXABAN 5 MG: 5 TABLET, FILM COATED ORAL at 09:10

## 2024-11-27 RX ADMIN — INSULIN LISPRO 4 UNITS: 100 INJECTION, SOLUTION INTRAVENOUS; SUBCUTANEOUS at 09:09

## 2024-11-27 RX ADMIN — METHYLPREDNISOLONE SODIUM SUCCINATE 40 MG: 40 INJECTION, POWDER, FOR SOLUTION INTRAMUSCULAR; INTRAVENOUS at 04:49

## 2024-11-27 RX ADMIN — BUDESONIDE AND FORMOTEROL FUMARATE DIHYDRATE 2 PUFF: 160; 4.5 AEROSOL RESPIRATORY (INHALATION) at 06:23

## 2024-11-27 RX ADMIN — PANTOPRAZOLE SODIUM 40 MG: 40 TABLET, DELAYED RELEASE ORAL at 04:51

## 2024-11-27 RX ADMIN — IPRATROPIUM BROMIDE 0.5 MG: 0.5 SOLUTION RESPIRATORY (INHALATION) at 12:10

## 2024-11-27 RX ADMIN — IPRATROPIUM BROMIDE 0.5 MG: 0.5 SOLUTION RESPIRATORY (INHALATION) at 00:41

## 2024-11-27 RX ADMIN — MINOCYCLINE HYDROCHLORIDE 200 MG: 50 CAPSULE ORAL at 11:52

## 2024-11-27 NOTE — CASE MANAGEMENT/SOCIAL WORK
Continued Stay Note  ADIS Giordano     Patient Name: Mayra Hays  MRN: [id number]  Today's Date: 11/27/2024    Admit Date: 11/23/2024     Discharge Plan       Row Name 11/27/24 1049       Plan    Plan Pt expects to be d/c home today. CM spoke with pt's daughter Hailey and she will return home with daughter to [address] [place] [address] [place] [address] today. Hailey will bring portable oxygen tank with her to transport home. Pt requests RW and uses [facility] for DME provider. Secure chat MD.    Patient/Family in Agreement with Plan yes             Britta Hoover RN

## 2024-11-27 NOTE — PROGRESS NOTES
[facility]     Patient Identification:  Name:  Mayra Hays  Age:  79 y.o.  Sex:  female  :  1945  MRN:  [id number]  Visit Number:  [id number]  Primary Care Provider:  Niki Antony APRN    Length of stay:  3    Chief complaint: Generalized weakness    Subjective:    Patient seen and examined at bedside with no nursing staff present.  Patient states she still feels weak and fatigued but is slowly improving.  She denies any shortness of breath or any other symptoms at this time.  Per nursing staff, no new events overnight.  ----------------------------------------------------------------------------------------------------------------------  Current Hospital Meds:  apixaban, 5 mg, Oral, Q12H  atorvastatin, 20 mg, Oral, Daily  budesonide-formoterol, 2 puff, Inhalation, BID - RT   And  ipratropium, 0.5 mg, Nebulization, Q6H - RT  hydrOXYzine, 25 mg, Oral, Nightly  insulin lispro, 2-9 Units, Subcutaneous, 4x Daily AC & at Bedtime  levoFLOXacin, 750 mg, Oral, Q48H  methylPREDNISolone sodium succinate, 40 mg, Intravenous, Q12H  minocycline, 200 mg, Oral, Q12H  pantoprazole, 40 mg, Oral, Q AM  sodium chloride, 10 mL, Intravenous, Q12H  sodium chloride, 10 mL, Intravenous, Q12H           ----------------------------------------------------------------------------------------------------------------------  Vital Signs:  Temp:  [97.5 °F (36.4 °C)-98.3 °F (36.8 °C)] 97.5 °F (36.4 °C)  Heart Rate:  [] 96  Resp:  [12-28] 25  BP: ()/() 180/92      24  0400 24  0400 24  1822   Weight: 113 kg (249 lb 1.9 oz) 112 kg (247 lb 2.2 oz) 98.3 kg (216 lb 12.5 oz)     Body mass index is 33.95 kg/m².    Intake/Output Summary (Last 24 hours) at 2024  Last data filed at 2024 1700  Gross per 24 hour   Intake 890 ml   Output 1900 ml   Net -1010 ml     Diet: Regular/House; Fluid Consistency: Thin (IDDSI  0)  ----------------------------------------------------------------------------------------------------------------------  Physical exam:  Constitutional: Well-nourished  female in no apparent distress.     HENT:  Head:  Normocephalic and atraumatic.  Mouth:  Moist mucous membranes.    Eyes:  Conjunctivae and EOM are normal.  Pupils are equal, round, and reactive to light.  No scleral icterus.    Neck:  Neck supple. No thyromegaly.  No JVD present.    Cardiovascular:  Regular rate and rhythm with no murmurs, rubs, clicks or gallops appreciated.  Pulmonary/Chest:  Clear to auscultation bilaterally with no crackles, wheezes or rhonchi appreciated.  Abdominal:  Soft. Nontender. Nondistended  Bowel sounds are normal in all four quadrants. No organomegally appreciated.   Musculoskeletal:   No edema, no tenderness, and no deformity.  No red or swollen joints anywhere.    Neurological:  Alert, Cranial nerves II-XII intact with no focal deficits.  No facial droop.  No slurred speech.   Skin:  Warm and dry to palpation with no rashes or lesions appreciated.  Peripheral vascular:  2+ radial and pedal pulses in bilateral upper and lower extremities.  Psychiatric:  Alert and oriented x3, demonstrates appropriate judgment and insight.    No significant change in physical exam in comparison to 11/25/2024  ---------------------------------------------------------------------------------------  ----------------------------------------------------------------------------------------------------------------------  Results from last 7 days   Lab Units 11/23/24  1714 11/23/24  1416   HSTROP T ng/L 20* 27*     Results from last 7 days   Lab Units 11/26/24  0041 11/25/24  0152 11/23/24  2318 11/23/24 2003 11/23/24  1707 11/23/24  1416   CRP mg/dL  --  28.35*  --   --   --  18.67*   LACTATE mmol/L  --   --  1.5 2.8* 4.0* 4.5*   WBC 10*3/mm3 13.90* 17.99* 26.67*  --   --  33.38*   HEMOGLOBIN g/dL 10.1* 10.3* 10.3*  --   --  13.7  "  HEMATOCRIT % 33.4* 34.3 34.1  --   --  44.0   MCV fL 88.8 89.3 88.6  --   --  86.1   MCHC g/dL 30.2* 30.0* 30.2*  --   --  31.1*   PLATELETS 10*3/mm3 161 152 169  --   --  253   INR   --   --   --   --   --  1.12*     Results from last 7 days   Lab Units 11/23/24  1446   PH, ARTERIAL pH units 7.327*   PO2 ART mm Hg 57.3*   PCO2, ARTERIAL mm Hg 38.6   HCO3 ART mmol/L 20.2     Results from last 7 days   Lab Units 11/26/24  0041 11/25/24  0152 11/23/24  2318 11/23/24  1416 11/21/24  1434   SODIUM mmol/L 141 141 141 140 137   POTASSIUM mmol/L 5.3* 4.8 4.3 4.3 4.2   MAGNESIUM mg/dL  --   --   --  1.9  --    CHLORIDE mmol/L 113* 110* 105 100 97*   CO2 mmol/L 17.6* 17.2* 20.7* 19.3* 27.7   BUN mg/dL 41* 42* 50* 47* 36*   CREATININE mg/dL 1.74* 1.98* 2.98* 3.26* 1.35*   CALCIUM mg/dL 8.4* 8.0* 8.3* 9.8 9.6   GLUCOSE mg/dL 312* 226* 157* 194* 281*   ALBUMIN g/dL  --   --   --  4.3 4.2   BILIRUBIN mg/dL  --   --   --  0.8 0.4   ALK PHOS U/L  --   --   --  112 112   AST (SGOT) U/L  --   --   --  19 13   ALT (SGPT) U/L  --   --   --  17 10   Estimated Creatinine Clearance: 32.1 mL/min (A) (by C-G formula based on SCr of 1.74 mg/dL (H)).    No results found for: \"AMMONIA\"      Blood Culture   Date Value Ref Range Status   11/23/2024 No growth at 2 days  Preliminary   11/23/2024 Abnormal Stain (C)  Preliminary     No results found for: \"URINECX\"  No results found for: \"WOUNDCX\"  No results found for: \"STOOLCX\"    I have personally looked at the labs and they are summarized above.  ----------------------------------------------------------------------------------------------------------------------  Imaging Results (Last 24 Hours)       ** No results found for the last 24 hours. **          ----------------------------------------------------------------------------------------------------------------------  Assessment and Plan:    Septic shock -initially required Levophed to maintain MAP greater than 65.  Levophed has now been " discontinued.  Septic shock resolved.  Secondary to multifocal pneumonia.    2.  Multifocal pneumonia -much improved, have de-escalated antibiotic therapy to minocycline and Levaquin in the setting of stenotrophomonas bacteremia.    3.  Stenotrophomonas bacteremia -have de-escalated antibiotic therapy to Levaquin and minocycline.  Will repeat blood cultures today.    4.  Acute on chronic hypoxic respiratory failure -slowly improving, continue supplemental oxygen to maintain O2 saturation greater than 90%.    5.  COPD exacerbation -continue Solu-Medrol and DuoNebs    6.  Acute kidney injury -serum creatinine continues to slowly improve, down to 1.7 today.      7.  Paroxysmal A-fib -continue Eliquis    8.  Essential hypertension -continue to hold all antihypertensive medications at this time in the setting of recent septic shock requiring vasopressors    9.  Type 2 diabetes mellitus -continue Accu-Cheks before every meal and nightly with sliding scale insulin    10.  Chronic HFpEF -no evidence of acute exacerbation at this time    Disposition possible discharge in the next 24 hours    Arnulfo Carty,    11/26/24   19:41 EST

## 2024-11-27 NOTE — PLAN OF CARE
Goal Outcome Evaluation:     Patient resting in bed. No visible indicators of acute distress noted. Plan of care ongoing.

## 2024-11-27 NOTE — DISCHARGE PLACEMENT REQUEST
"Charles Archer (79 y.o. Female)       Date of Birth   1945    Social Security Number       Address   [address] [address]    Home Phone   [phone]    MRN   [id number]       Yarsanism   None    Marital Status   Single                            Admission Date   11/23/24    Admission Type   Emergency    Admitting Provider   Portia Contreras DO    Attending Provider   Arnulfo Carty DO    Department, Room/Bed   [address], [address]19/       Discharge Date       Discharge Disposition   Home or Self Care    Discharge Destination                                 Attending Provider: Arnulfo Carty DO    Allergies: Paclitaxel    Isolation: None   Infection: None   Code Status: CPR    Ht: 170.2 cm (67\")   Wt: 98.3 kg (216 lb 12.5 oz)    Admission Cmt: None   Principal Problem: Septic shock due to undetermined organism [A41.9,R65.21]                   Active Insurance as of 11/23/2024       Primary Coverage       Payor Plan Insurance Group Employer/Plan Group    ANTHEM MEDICARE REPLACEMENT ANTHEM MEDICARE ADVANTAGE [id number]       Payor Plan Address Payor Plan Phone Number Payor Plan Fax Number Effective Dates    [address] [phone]  2/1/2023 - None Entered    [facility] [id number]         Subscriber Name Subscriber Birth Date Member ID       CHARLES ARCHER 1945 [id number]               Secondary Coverage       Payor Plan Insurance Group Employer/Plan Group    [place]        Payor Plan Address Payor Plan Phone Number Payor Plan Fax Number Effective Dates    [address]   1/1/2014 - None Entered    [facility] [phone]         Subscriber Name Subscriber Birth Date Member ID       CHARLES ARCHER 1945 [id number]                     Emergency Contacts        (Rel.) Home Phone Work Phone Mobile Phone    Hailey Quinonez (HERMINIO) (Power of ) [phone] -- --    PHIL PRASAD (alternative " "POA) (Power of ) -- -- [phone]          [address] [address]  Dept. Phone:  [phone]  Dept. Fax:  [phone] Date Ordered: 2024         Patient:  Mayra Hays MRN:  [id number]   [address] [place] [id number] :  1945  SSN:    Phone: [phone] Sex:  F     Weight: 98.3 kg (216 lb 12.5 oz)         Ht Readings from Last 1 Encounters:   24 170.2 cm (67\")         Walker  Walker Folding with Wheels              (Order ID: [id number])    Diagnosis:  Lung mass (R91.8 [ICD-10-CM] 786.6 [ICD-9-CM])   Quantity:  1     Mobility Limitation: Prevents Accomplishing MRADLs  Safety: Able to Safely Use Equipment  Mobility Deficit: Mobility Deficit Can Be Sufficiently Resolved By Use of Equipment  Equipment:  Walker Folding with Wheels  Length of Need: 99 Months = Lifetime        Authorizing Provider's Phone: [phone]  Authorizing Provider:Arnulfo Carty DO  Authorizing Provider's NPI: [id number]  Order Entered By: Arnulfo Carty DO 2024 11:17 AM     Electronically signed by: Arnulfo Carty DO 2024 11:17 AM          History & Physical        Portia Contreras DO at 24 193              [facility] HOSPITALIST HISTORY AND PHYSICAL    Patient Identification:  Name:  Mayra Hays  Age:  79 y.o.  Sex:  female  :  1945  MRN:  [id number]   Admit Date: 2024   Visit Number:  [id number]  Room number:  P213/S2  Primary Care Physician:  Niki Antony APRN     Subjective     Chief complaint:    Chief Complaint   Patient presents with    Vomiting    Weakness - Generalized       History of presenting illness:   Mayra Hays is our 79 year old female patient with a past medical history significant for non-small cell lung cancer undergoing chemotherapy (last treatment was 2 days ago), CHF, atrial fibrillation on chronic anticoagulation with eliquis, " "hypertension, hyperlipidemia, and CKD stage II/IIIa who presented to the ER today with complaint of generalized weakness and chest tightness.    Patient reports that she began to feel bad last night with severe weakness, malaise, productive cough, and shortness of breath described as her like her chest is \"tied up\". She also reports generalized dull abdominal pain. Family member present reports that she had multiple episodes of vomiting overnight. In the ER she was found to have multifocal pneumonia and she was slightly hypotensive which improved with an IV fluid bolus.     Review of Systems   Constitutional:  Positive for fatigue. Negative for fever.   HENT:  Positive for congestion. Negative for sore throat.    Respiratory:  Positive for cough and shortness of breath.    Cardiovascular:  Negative for chest pain and leg swelling.   Gastrointestinal:  Positive for abdominal pain, nausea and vomiting.   Genitourinary:  Negative for difficulty urinating and dysuria.   Musculoskeletal:  Negative for arthralgias and myalgias.   Skin:  Negative for rash and wound.   Neurological:  Negative for dizziness and syncope.   Psychiatric/Behavioral:  Negative for agitation and confusion.      Past Medical History:   Diagnosis Date    Arthritis     CHF (congestive heart failure)     Chronic anticoagulation     Eliquis    Chronic kidney disease     stage II/IIIa    Collapsed lung     COPD (chronic obstructive pulmonary disease)     Diabetes mellitus     TYPE 2    Elevated cholesterol     GERD (gastroesophageal reflux disease)     Hyperlipidemia     Hypertension     Non-small cell lung cancer RUL     Sleep apnea     non compliant with CPAP    Stroke 2019    Wears eyeglasses      Past Surgical History:   Procedure Laterality Date    BRONCHOSCOPY Bilateral 02/27/2023    Procedure: BRONCHOSCOPY WITH ENDOBRONCHIAL ULTRASOUND;  Surgeon: Abdulkadir Peter MD;  Location: [facility];  Service: Pulmonary;  Laterality: Bilateral;    " BRONCHOSCOPY WITH ION ROBOTIC ASSIST N/A 2023    Procedure: BRONCHOSCOPY WITH ION ROBOT AND EBUS;  Surgeon: John Clemens MD;  Location:  [facility];  Service: Robotics - Pulmonary;  Laterality: N/A;  Ion cath #6 - 0032,  #6 - 0030, cath guide # 0077. EBUS scope removed with balloon intact.    CARDIAC CATHETERIZATION N/A 2017    Procedure: Left Heart Cath;  Surgeon: Jatinder Matias MD;  [facility]  [facility];  Service:     CARDIAC CATHETERIZATION N/A 2021    Procedure: Left Heart Cath;  Surgeon: Tolu Steinberg MD;  [facility]  [facility];  Service: Cardiology;  Laterality: N/A;    COLONOSCOPY      ENDOSCOPY      GALLBLADDER SURGERY      PORTACATH PLACEMENT N/A 2023    Procedure: INSERTION OF PORTACATH;  Surgeon: Petr Velásquez MD;  Location:  [facility];  Service: General;  Laterality: N/A;    VENTRAL HERNIA REPAIR N/A 2020    Procedure: VENTRAL HERNIA REPAIR LAPAROSCOPIC WITH DAVINCI ROBOT;  Surgeon: Petr Velásquez MD;  Location:  [facility];  Service: DaVinci;  Laterality: N/A;     Family History   Problem Relation Age of Onset    Heart disease Mother         Rhianna sophia    Heart disease Father     Heart attack Father     Heart failure Father      Social History     Socioeconomic History    Marital status: Single    Number of children: 6   Tobacco Use    Smoking status: Former     Current packs/day: 0.00     Average packs/day: 3.0 packs/day for 16.7 years (50.2 ttl pk-yrs)     Types: Cigarettes     Start date: 4/3/1998     Quit date: 2015     Years since quittin.9     Passive exposure: Past    Smokeless tobacco: Never   Vaping Use    Vaping status: Never Used   Substance and Sexual Activity    Alcohol use: No    Drug use: No    Sexual activity: Never       Allergies:  Paclitaxel  Medications below are reported home medications pulling from within the system; at this time, these medications have not been reconciled  unless otherwise specified and are in the verification process for further verifcation as current home medications.    Prior to Admission Medications       Prescriptions Last Dose Informant Patient Reported? Taking?    albuterol sulfate  (90 Base) MCG/ACT inhaler   No No    Inhale 2 puffs by mouth Every 4 (Four) Hours As Needed for Wheezing.    apixaban (ELIQUIS) 5 MG tablet tablet   No No    Take 1 tablet by mouth Every 12 (Twelve) Hours.    atorvastatin (LIPITOR) 20 MG tablet   Yes No    Take 1 tablet by mouth Daily.    Budeson-Glycopyrrol-Formoterol (Breztri Aerosphere) 160-9-4.8 MCG/ACT aerosol inhaler   No No    Inhale 2 puffs by mouth 2 (Two) Times a Day.    esomeprazole (nexIUM) 20 MG capsule   Yes No    Take 1 capsule by mouth Daily.    hydrOXYzine pamoate (VISTARIL) 25 MG capsule   Yes No    Take 1 capsule by mouth Every Night.    ipratropium-albuterol (DUO-NEB) 0.5-2.5 mg/3 ml nebulizer   No No    Take 3 mL by nebulization Every 4 (Four) Hours As Needed for Wheezing or Shortness of Air.    lidocaine-prilocaine (EMLA) 2.5-2.5 % cream   No No    Apply to port-a-cath site 30 minutes prior to arrival at infusion center. Cover with plastic wrap.    lisinopril (PRINIVIL,ZESTRIL) 10 MG tablet   No No    Take 1 tablet by mouth Daily.    metFORMIN (GLUCOPHAGE) 500 MG tablet  Pharmacy Yes No    Take 1 tablet by mouth 2 (Two) Times a Day As Needed (only takes if blood glucose is above 200). As needed    prochlorperazine (COMPAZINE) 10 MG tablet   No No    Take 1 tablet by mouth Every 6 (Six) Hours As Needed for Nausea or Vomiting.    spironolactone (ALDACTONE) 25 MG tablet  Pharmacy, Care Giver Yes No    Take 1 tablet by mouth Daily.          Objective     Vital Signs:  Temp:  [97.9 °F (36.6 °C)-98.2 °F (36.8 °C)] 98.2 °F (36.8 °C)  Heart Rate:  [69-81] 77  Resp:  [16-19] 19  BP: ()/() 92/47    Mean Arterial Pressure (Non-Invasive) for the past 24 hrs (Last 3 readings):   Noninvasive MAP (mmHg)    11/23/24 1840 66   11/23/24 1757 68   11/23/24 1730 42     SpO2:  [90 %-92 %] 90 %  on   ;      Body mass index is 31.01 kg/m².    Wt Readings from Last 3 Encounters:   11/23/24 95.3 kg (210 lb)   11/21/24 95.9 kg (211 lb 8 oz)   11/21/24 95.9 kg (211 lb 8 oz)        Physical Exam:   Constitutional:  Well-developed and well-nourished. Acutely and chronically ill appearing. No acute distress.      HENT:  Head:  Normocephalic and atraumatic.  Mouth:  Moist mucous membranes.    Eyes:  Conjunctivae normal.   Neck:  Neck supple.    Cardiovascular:  Normal rate, irregularly irregular rhythm. No murmur.  Pulmonary/Chest:  Normal rate and effort. Diffuse rhonchi bilaterally.  Abdominal:  Soft. No distension, mild generalized tenderness that is worse in lower abdomen. Normal bowel sounds present.  Musculoskeletal:  No tenderness and no deformity.  No red or swollen joints anywhere.    Neurological: Awake, alert, no focal deficit on gross examination. No slurred speech or facial droop.    Skin:  Skin is warm and dry. No rash noted. Appears pale. There is a petechial appearing rash on lower extremities.  Peripheral vascular:  No cyanosis, no edema.    EKG:    ECG 12 Lead Other; Sepsis   Final Result   Test Reason : Other~   Blood Pressure :   */*   mmHG   Vent. Rate :  74 BPM     Atrial Rate :  74 BPM      P-R Int : 160 ms          QRS Dur : 100 ms       QT Int : 416 ms       P-R-T Axes :  70 -14 117 degrees     QTcB Int : 461 ms      Sinus rhythm with premature supraventricular complexes   Minimal voltage criteria for LVH, may be normal variant   Cannot rule out Anterior infarct (cited on or before 01-Jan-2024)   Abnormal ECG   When compared with ECG of 06-Jun-2024 15:10,   premature supraventricular complexes are now present   ST now depressed in Inferior leads   Nonspecific T wave abnormality now evident in Inferior leads   T wave inversion more evident in Lateral leads   Confirmed by Aldair Segovia ([id number]) on 11/23/2024  3:16:29 PM      Referred By: SHONDA           Confirmed By: Aldair Segovia          Telemetry:  irregularly irregular rhythm, rate in the 80s      Last echocardiogram:  Results for orders placed during the hospital encounter of 04/10/24    Adult Transthoracic Echo Complete w/ Color, Spectral and Contrast if necessary per protocol    Interpretation Summary    Left ventricular systolic function is normal. Calculated left ventricular EF = 52% Left ventricular ejection fraction appears to be 51 - 55%.    Left ventricular diastolic function was normal.    The left atrial cavity is moderately dilated.    Estimated right ventricular systolic pressure from tricuspid regurgitation is mildly elevated (35-45 mmHg).      Labs:  Results from last 7 days   Lab Units 11/23/24  1707 11/23/24  1416 11/21/24  1434   PROCALCITONIN ng/mL  --  21.54*  --    LACTATE mmol/L 4.0* 4.5*  --    CRP mg/dL  --  18.67*  --    WBC 10*3/mm3  --  33.38* 12.07*   HEMOGLOBIN g/dL  --  13.7 12.8   HEMATOCRIT %  --  44.0 40.6   MCV fL  --  86.1 85.3   MCHC g/dL  --  31.1* 31.5   PLATELETS 10*3/mm3  --  253 252   INR   --  1.12*  --      Results from last 7 days   Lab Units 11/23/24  1446   PH, ARTERIAL pH units 7.327*   PO2 ART mm Hg 57.3*   PCO2, ARTERIAL mm Hg 38.6   HCO3 ART mmol/L 20.2     Results from last 7 days   Lab Units 11/23/24  1419 11/23/24  1416 11/21/24  1434   SODIUM mmol/L  --  140 137   POTASSIUM mmol/L  --  4.3 4.2   MAGNESIUM mg/dL  --  1.9  --    CHLORIDE mmol/L  --  100 97*   CO2 mmol/L  --  19.3* 27.7   BUN mg/dL  --  47* 36*   CREATININE mg/dL  --  3.26* 1.35*   CALCIUM mg/dL  --  9.8 9.6   IONIZED CALCIUM mmol/L 1.12*  --   --    GLUCOSE mg/dL  --  194* 281*   ALBUMIN g/dL  --  4.3 4.2   BILIRUBIN mg/dL  --  0.8 0.4   ALK PHOS U/L  --  112 112   AST (SGOT) U/L  --  19 13   ALT (SGPT) U/L  --  17 10   Estimated Creatinine Clearance: 17.5 mL/min (A) (by C-G formula based on SCr of 3.26 mg/dL (H)).    No results found for:  "\"AMMONIA\"  Results from last 7 days   Lab Units 11/23/24  1714 11/23/24  1416   HSTROP T ng/L 20* 27*         No results found for: \"HGBA1C\", \"POCGLU\"  Lab Results   Component Value Date    TSH 2.250 11/21/2024    FREET4 1.04 11/21/2024     No results found for: \"PREGTESTUR\", \"PREGSERUM\", \"HCG\", \"HCGQUANT\"  Pain Management Panel  More data exists         Latest Ref Rng & Units 1/2/2024 1/1/2024   Pain Management Panel   Creatinine, Urine mg/dL 152.1  -   Amphetamine, Urine Qual Negative - Negative    Barbiturates Screen, Urine Negative - Negative    Benzodiazepine Screen, Urine Negative - Negative    Buprenorphine, Screen, Urine Negative - Negative    Cocaine Screen, Urine Negative - Negative    Fentanyl, Urine Negative - Negative    Methadone Screen , Urine Negative - Negative    Methamphetamine, Ur Negative - Negative       Details                 Brief Urine Lab Results  (Last result in the past 365 days)        Color   Clarity   Blood   Leuk Est   Nitrite   Protein   CREAT   Urine HCG        11/23/24 1459 Yellow   Clear   Negative   Negative   Negative   Negative                   I have personally looked at the labs and they are summarized above.    Detailed radiology reports for the last 24 hours:    Imaging Results (Last 24 Hours)       Procedure Component Value Units Date/Time    CT Chest Without Contrast Diagnostic [[id number]] Collected: 11/23/24 1628     Updated: 11/23/24 1636    Narrative:      VERIFICATION OBSERVER NAME: Claudio Rodgers MD.     Technique: Axial images were obtained along with coronal and sagittal  reconstruction. DLP in mGycm reported in the EMR records. Dose lowering  technique: Automated exposure control with adjustment of the MA and/or  KV, use of iterative reconstruction. Data included in the medical  records.     HISTORY/COMPARISON/FINDINGS:     Comparison: None.     No hilar or mediastinal adenopathy.  Large consolidation RIGHT upper lobe with air bronchogram grams  consistent with " pneumonia.  No mediastinal adenopathy.  Minimal cardiac enlargement.  No pericardial effusion.  Scattered nodules and extensive consolidation RIGHT lower lung field  consistent with pneumonia.  LEFT lung is clear.  No pleural effusion.  Small hiatal hernia.     Liver and spleen appeared unremarkable.  Thickened LEFT adrenal gland likely due to multiple nodules or  hyperplasia.  Post cholecystectomy.  Fatty infiltration of the pancreas.  No  inflammatory or infectious process.  No kidney stones or hydronephrosis.    No evidence of bowel obstruction.  Urinary bladder is unremarkable.  Female pelvic organs are normal.  Normal appendix.  Degenerative changes at L5-S1.       Impression:      Multifocal pneumonia in the chest.  No acute process noted in the abdomen or pelvis.                             [id number], Zip code [id number].        This report was finalized on 11/23/2024 4:34 PM by Dr. Claudio Rodgers MD.       CT Abdomen Pelvis Without Contrast [[id number]] Resulted: 11/23/24 1548     Updated: 11/23/24 1612    XR Chest 1 View [[id number]] Collected: 11/23/24 1532     Updated: 11/23/24 1535    Narrative:      INDICATION: Weakness.     TECHNIQUE: Frontal radiograph of the chest.     COMPARISON: 6/6/2024.     FINDINGS:   Cardiomegaly. Elevation of the right hemidiaphragm. Subsegmental  atelectasis/airspace disease in the lung bases. Right upper lobe  perihilar opacity again noted. No pleural effusion or pneumothorax.  Chest port catheter in similar position. No acute fracture.       Impression:      Subsegmental atelectasis/airspace disease in the lung bases.     This report was finalized on 11/23/2024 3:33 PM by Alex Pallas, DO.             Final impressions for the last 30 days of radiology reports:    CT Chest Without Contrast Diagnostic    Result Date: 11/23/2024  Multifocal pneumonia in the chest. No acute process noted in the abdomen or pelvis.          [id number], Zip code [id number].   This report was finalized on  11/23/2024 4:34 PM by Dr. Claudio Rodgers MD.      XR Chest 1 View    Result Date: 11/23/2024  Subsegmental atelectasis/airspace disease in the lung bases.  This report was finalized on 11/23/2024 3:33 PM by Alex Pallas, DO.      CT Chest With Contrast Diagnostic    Result Date: 11/11/2024  1.  Soft tissue density with volume loss and air bronchograms emanating from the right suprahilar aspect has features on CT most consistent with evolving fibrosis. 2.  No discrete pulmonary nodule or mass has developed in the right lung to suggest recurrent disease. 3.  No hilar or mediastinal adenopathy based on CT size criteria. 4.  Centrilobular nodules throughout the left mid-lower lung zones noted most consistent with atypical pneumonia or aspiration pneumonitis. 5.  Mild interstitial thickening favors mild edema in the setting of CHF or volume overload.   This report was finalized on 11/11/2024 12:06 PM by Dr. Kunal Castanon MD.         Assessment & Plan      #Septic shock by CMS criteria with lactate >4, WBC >12  #Multifocal pneumonia  #Immunocompromised state due to chemotherapy  #Nausea and vomiting, resolved  #Acute kidney injury, suspect due to sepsis   #Acute on chronic hypoxic respiratory failure  - Started on broad spectrum empiric antibiotics in the ER with cefepime and vancomycin. Continue same.  - Baseline creatinine appears to be around 0.9 and creatinine today is 3.26. Avoid nephrotoxins. She received a 30mL/kg fluid bolus in the ER. Repeat BMP in a.m.  - follow up blood cultures. Check MRSA nasal PCR, strep pneumo antigen, legionella antigen.   - BP was still borderline low after IV fluids. If MAP does not maintain >65 then we will start levophed for blood pressure support.   - CBC in a.m. to trend leukocytosis.   - Patient reportedly only wears 2L O2 nightly, but is requiring it continuously at this time and pO2 on room air was low at 57.3. Titrate supplemental oxygen to keep O2 saturation >90%    #Non-small  cell lung cancer  - supportive care, outpatient follow up with hematology/oncology     Dispo: admit INPATIENT status due to the need for care which can only be reasonably provided in an hospital setting such as aggressive/expedited ancillary services and/or consultation services, the necessity for IV medications, close physician monitoring and/or the possible need for procedures.  In such, I feel patient’s risk for adverse outcomes and need for care warrant INPATIENT evaluation and predict the patient’s care encounter to likely last beyond 2 midnights.     Portia Contreras DO  [facility]  11/23/24  19:37 EST     Electronically signed by Portia Contreras DO at 11/23/24 [id number]

## 2024-11-27 NOTE — CASE MANAGEMENT/SOCIAL WORK
Case Management Discharge Note      Final Note: Pt is being d/c home. CM received order for RW and pt is now requesting a rollator. CM contacted [facility] per Elissa and rollator will be delivered to pt's room. Pt's daughter Hailey will bring portable oxygen tank when transports home. Lead JAMES Post informed.          Final Discharge Disposition Code: 01 - home or self-care

## 2024-11-27 NOTE — DISCHARGE SUMMARY
[facility] HOSPITALIST MEDICINE DISCHARGE SUMMARY    Patient Identification:  Name:  Mayra Hays  Age:  79 y.o.  Sex:  female  :  1945  MRN:  [id number]  Visit Number:  [id number]    Date of Admission: 2024  Date of Discharge: 2024    PCP: Niki Antony APRN    DISCHARGE DIAGNOSIS   Septic shock  Multifocal pneumonia  Stenotrophomonas bacteremia  Acute on chronic hypoxic respiratory failure  COPD exacerbation  Acute kidney injury  Paroxysmal A-fib  Essential hypertension  Type 2 diabetes mellitus  Chronic HFpEF      CONSULTS  None      PROCEDURES PERFORMED   None      HOSPITAL COURSE  Ms. Hays is a 79 y.o. female who presented to [facility] ED on 2024 with a chief complaint of generalized weakness with chest tightness.  Patient reported onset of severe weakness with malaise and productive cough with associated shortness of breath on the night prior to evaluation in the emergency department.  Initial evaluation the emergency department did consist of basic laboratory work as well as physical exam and vital signs.  Please see initial H&P for specific details.  After initial evaluation in emergency department, patient did meet septic shock criteria with white blood cell count greater than 12 and lactic acid greater than 4 in the presence of multifocal pneumonia.  Patient was admitted for further treatment and evaluation.    Patient was started on empiric antibiotic therapy with cefepime and vancomycin.  Patient was continued on this antibiotic therapy until blood cultures were returned which demonstrated 1 of 2 cultures positive for stenotrophomonas.  Patient was then de-escalated to Levaquin and minocycline.  Bactrim was deferred secondary to patient's acute kidney injury and advanced age.  On initial evaluation, patient's creatinine was elevated at 3.2 but did improve back down to 1.3 on date of discharge.  Repeat blood cultures were obtained 2024 which  demonstrate no growth to date.  Overall, patient has responded quite well to previously described interventions.  Patient reports her shortness of breath is completely resolved and respiratory status has returned to baseline.  In regards to bacteremia, patient will be made a follow-up appointment with infectious disease services within 2 weeks of discharge.  Acute kidney injury has resolved on date of discharge.  With this in mind, it is felt patient has reached maximum medical benefit of current hospitalization and will be discharged home in stable condition today.  The beforementioned plan was thoroughly discussed with the patient and she expressed understanding and willingness to proceed with the beforementioned plan.    VITAL SIGNS:      11/26/24  0400 11/26/24  1822 11/27/24  0500   Weight: 112 kg (247 lb 2.2 oz) 98.3 kg (216 lb 12.5 oz) (transfer.)     Body mass index is 33.95 kg/m².    PHYSICAL EXAM:  Constitutional: Well-nourished  female in no apparent distress.     HENT:  Head:  Normocephalic and atraumatic.  Mouth:  Moist mucous membranes.    Eyes:  Conjunctivae and EOM are normal.  Pupils are equal, round, and reactive to light.  No scleral icterus.    Neck:  Neck supple. No thyromegaly.  No JVD present.    Cardiovascular:  Regular rate and rhythm with no murmurs, rubs, clicks or gallops appreciated.  Pulmonary/Chest:  Clear to auscultation bilaterally with no crackles, wheezes or rhonchi appreciated.  Abdominal:  Soft. Nontender. Nondistended  Bowel sounds are normal in all four quadrants. No organomegally appreciated.   Musculoskeletal:   No edema, no tenderness, and no deformity.  No red or swollen joints anywhere.    Neurological:  Alert, Cranial nerves II-XII intact with no focal deficits.  No facial droop.  No slurred speech.   Skin:  Warm and dry to palpation with no rashes or lesions appreciated.  Peripheral vascular:  2+ radial and pedal pulses in bilateral upper and lower  extremities.  Psychiatric:  Alert and oriented x3, demonstrates appropriate judgment and insight.    DISCHARGE DISPOSITION   Stable    DISCHARGE MEDICATIONS:     Discharge Medications        New Medications        Instructions Start Date   levoFLOXacin 750 MG tablet  Commonly known as: LEVAQUIN   750 mg, Oral, Every 48 Hours      minocycline 100 MG capsule  Commonly known as: MINOCIN,DYNACIN   200 mg, Oral, Every 12 Hours Scheduled             Continue These Medications        Instructions Start Date   albuterol sulfate  (90 Base) MCG/ACT inhaler  Commonly known as: PROVENTIL HFA;VENTOLIN HFA;PROAIR HFA   Inhale 2 puffs by mouth Every 4 (Four) Hours As Needed for Wheezing.      apixaban 5 MG tablet tablet  Commonly known as: ELIQUIS   5 mg, Oral, Every 12 Hours Scheduled      atorvastatin 20 MG tablet  Commonly known as: LIPITOR   1 tablet, Oral, Daily      Breztri Aerosphere 160-9-4.8 MCG/ACT aerosol inhaler  Generic drug: Budeson-Glycopyrrol-Formoterol   Inhale 2 puffs by mouth 2 (Two) Times a Day.      esomeprazole 20 MG capsule  Commonly known as: nexIUM   20 mg, Oral, Daily      hydrOXYzine pamoate 25 MG capsule  Commonly known as: VISTARIL   25 mg, Oral, Nightly      lisinopril 10 MG tablet  Commonly known as: PRINIVIL,ZESTRIL   10 mg, Oral, Daily      metFORMIN 500 MG tablet  Commonly known as: GLUCOPHAGE   500 mg, Oral, 2 Times Daily PRN, As needed             Stop These Medications      lidocaine-prilocaine 2.5-2.5 % cream  Commonly known as: EMLA              Diet Instructions    Resume diet as tolerated          Your Scheduled Appointments      Dec 05, 2024 2:15 PM  LAB with LAB DRAW COR OP INFUSION  [facility] OUTPATIENT INFUSION ([place]) [address]  [place] [address]  [phone]        Dec 05, 2024 2:15 PM  INFUSION with  COR OP INFUS CHAIR 27  [facility] OUTPATIENT INFUSION ([place]) [address]  [place] [address]  [phone]        Dec 19, 2024 2:15  PM  LAB with LAB DRAW COR OP INFUSION  [facility] OUTPATIENT INFUSION ([place]) [address]  [place] [address] [phone]        Dec 19, 2024 2:15 PM  INFUSION with  COR OP INFUS CHAIR 27  [facility] OUTPATIENT INFUSION ([place]) [address]  [place] [address]  [phone]        Mar 04, 2025 1:00 PM  Follow Up with  COR PULMONARY CLIN  [facility] PULMONARY CLINIC (--) [address]  [place] [address] [phone]   Arrive 15 minutes prior to appointment.         May 06, 2025 11:00 AM  Follow Up with Darek Nelson PA-C  [facility] CARDIOLOGY ([place]) [address]  [facility] [address] [phone]   -Bring photo ID, insurance card, and list of medications to appointment  -If testing was completed outside of [facility] then patient must bring images on a disc  -Copay will be collected at time of appointment  -Established patients should arrive 10 minutes prior to appointment         May 07, 2025 10:00 AM  Follow Up with ERASTO Mckeon  [facility] PULMONARY & CRITICAL CARE MEDICINE ([place]) [address]  [facility] [address] [phone]   Established: Please bring outside images or reports.              Activity Instructions    Resume activity as tolerated          Additional Instructions for the Follow-ups that You Need to Schedule       Discharge Follow-up with PCP   As directed       Currently Documented PCP:    Niki Antony APRN    PCP Phone Number:    [phone]     Follow Up Details: please follow up with pcp in 1 week        Discharge Follow-up with Specialty: ID; 2 Weeks   As directed      Specialty: ID   Follow Up: 2 Weeks   Follow Up Details: stenotrophomonas bacteremia               Follow-up Information       Niki Antony APRN Follow up in 1 week(s).    Specialty: Nurse Practitioner  Why: DEC 2 @ 11:30 AM  Contact information:  [address]  [facility]  [id number]  [phone]                             TEST  RESULTS PENDING AT DISCHARGE  Pending Labs       Order Current Status    Blood Culture - Blood, Arm, Left Preliminary result    Blood Culture - Blood, Arm, Right Preliminary result    Blood Culture - Blood, Arm, Right Preliminary result    Blood Culture - Blood, Hand, Left Preliminary result             Arnulfo Carty, DO  11/27/24  12:43 EST    Please note that this discharge summary required more than 30 minutes to complete.    Please send a copy of this dictation to the following providers:  Niki Antony, APRN

## 2024-11-28 LAB
BACTERIA SPEC AEROBE CULT: ABNORMAL
BACTERIA SPEC AEROBE CULT: NORMAL
GRAM STN SPEC: ABNORMAL
ISOLATED FROM: ABNORMAL

## 2024-11-28 NOTE — OUTREACH NOTE
Prep Survey      Flowsheet Row Responses   [facility] patient discharged from? [place]   Is LACE score < 7 ? No   Eligibility Readm Mgmt   Discharge diagnosis Septic shock due to undetermined organism   Does the patient have one of the following disease processes/diagnoses(primary or secondary)? Sepsis   Prep survey completed? Yes            Alma ADHIKARI - Registered Nurse

## 2024-12-01 LAB
BACTERIA SPEC AEROBE CULT: NORMAL
BACTERIA SPEC AEROBE CULT: NORMAL

## 2024-12-03 ENCOUNTER — READMISSION MANAGEMENT (OUTPATIENT)
Dept: CALL CENTER | Facility: HOSPITAL | Age: 79
End: 2024-12-03
Payer: MEDICARE

## 2024-12-03 NOTE — OUTREACH NOTE
Sepsis Week 1 Survey      Flowsheet Row Responses   [facility] patient discharged from? [place]   Does the patient have one of the following disease processes/diagnoses(primary or secondary)? Sepsis   Week 1 attempt successful? No   Unsuccessful attempts Attempt 1            Tameka ADHIKARI - Registered Nurse

## 2024-12-05 ENCOUNTER — OFFICE VISIT (OUTPATIENT)
Dept: ONCOLOGY | Facility: CLINIC | Age: 79
End: 2024-12-05
Payer: MEDICARE

## 2024-12-05 ENCOUNTER — LAB (OUTPATIENT)
Dept: ONCOLOGY | Facility: HOSPITAL | Age: 79
End: 2024-12-05
Payer: MEDICARE

## 2024-12-05 ENCOUNTER — INFUSION (OUTPATIENT)
Dept: ONCOLOGY | Facility: HOSPITAL | Age: 79
End: 2024-12-05
Payer: MEDICARE

## 2024-12-05 VITALS
HEART RATE: 80 BPM | RESPIRATION RATE: 18 BRPM | SYSTOLIC BLOOD PRESSURE: 136 MMHG | OXYGEN SATURATION: 95 % | DIASTOLIC BLOOD PRESSURE: 75 MMHG | BODY MASS INDEX: 34.14 KG/M2 | TEMPERATURE: 97.3 F | WEIGHT: 218 LBS

## 2024-12-05 VITALS
TEMPERATURE: 97.3 F | BODY MASS INDEX: 34.14 KG/M2 | RESPIRATION RATE: 18 BRPM | HEART RATE: 80 BPM | SYSTOLIC BLOOD PRESSURE: 136 MMHG | WEIGHT: 218 LBS | DIASTOLIC BLOOD PRESSURE: 75 MMHG | OXYGEN SATURATION: 95 %

## 2024-12-05 DIAGNOSIS — C34.91 NON-SMALL CELL CANCER OF RIGHT LUNG: ICD-10-CM

## 2024-12-05 DIAGNOSIS — C34.91 NON-SMALL CELL CANCER OF RIGHT LUNG: Primary | ICD-10-CM

## 2024-12-05 DIAGNOSIS — Z95.828 PORT-A-CATH IN PLACE: Primary | ICD-10-CM

## 2024-12-05 DIAGNOSIS — C34.11 PRIMARY CANCER OF RIGHT UPPER LOBE OF LUNG: ICD-10-CM

## 2024-12-05 DIAGNOSIS — R53.83 OTHER FATIGUE: ICD-10-CM

## 2024-12-05 LAB
ALBUMIN SERPL-MCNC: 3.4 G/DL (ref 3.5–5.2)
ALBUMIN/GLOB SERPL: 1 G/DL
ALP SERPL-CCNC: 120 U/L (ref 39–117)
ALT SERPL W P-5'-P-CCNC: 37 U/L (ref 1–33)
ANION GAP SERPL CALCULATED.3IONS-SCNC: 10.6 MMOL/L (ref 5–15)
AST SERPL-CCNC: 36 U/L (ref 1–32)
BASOPHILS # BLD AUTO: 0.06 10*3/MM3 (ref 0–0.2)
BASOPHILS NFR BLD AUTO: 0.7 % (ref 0–1.5)
BILIRUB SERPL-MCNC: 0.6 MG/DL (ref 0–1.2)
BUN SERPL-MCNC: 33 MG/DL (ref 8–23)
BUN/CREAT SERPL: 27.7 (ref 7–25)
CALCIUM SPEC-SCNC: 8.6 MG/DL (ref 8.6–10.5)
CHLORIDE SERPL-SCNC: 108 MMOL/L (ref 98–107)
CO2 SERPL-SCNC: 18.4 MMOL/L (ref 22–29)
CREAT SERPL-MCNC: 1.19 MG/DL (ref 0.57–1)
DEPRECATED RDW RBC AUTO: 44.4 FL (ref 37–54)
EGFRCR SERPLBLD CKD-EPI 2021: 46.9 ML/MIN/1.73
EOSINOPHIL # BLD AUTO: 0.22 10*3/MM3 (ref 0–0.4)
EOSINOPHIL NFR BLD AUTO: 2.5 % (ref 0.3–6.2)
ERYTHROCYTE [DISTWIDTH] IN BLOOD BY AUTOMATED COUNT: 14 % (ref 12.3–15.4)
GLOBULIN UR ELPH-MCNC: 3.3 GM/DL
GLUCOSE SERPL-MCNC: 118 MG/DL (ref 65–99)
HCT VFR BLD AUTO: 41.1 % (ref 34–46.6)
HGB BLD-MCNC: 12.6 G/DL (ref 12–15.9)
IMM GRANULOCYTES # BLD AUTO: 0.05 10*3/MM3 (ref 0–0.05)
IMM GRANULOCYTES NFR BLD AUTO: 0.6 % (ref 0–0.5)
LYMPHOCYTES # BLD AUTO: 1.83 10*3/MM3 (ref 0.7–3.1)
LYMPHOCYTES NFR BLD AUTO: 20.9 % (ref 19.6–45.3)
MCH RBC QN AUTO: 26.4 PG (ref 26.6–33)
MCHC RBC AUTO-ENTMCNC: 30.7 G/DL (ref 31.5–35.7)
MCV RBC AUTO: 86.2 FL (ref 79–97)
MONOCYTES # BLD AUTO: 0.79 10*3/MM3 (ref 0.1–0.9)
MONOCYTES NFR BLD AUTO: 9 % (ref 5–12)
NEUTROPHILS NFR BLD AUTO: 5.8 10*3/MM3 (ref 1.7–7)
NEUTROPHILS NFR BLD AUTO: 66.3 % (ref 42.7–76)
NRBC BLD AUTO-RTO: 0 /100 WBC (ref 0–0.2)
PLATELET # BLD AUTO: 230 10*3/MM3 (ref 140–450)
PMV BLD AUTO: 11.5 FL (ref 6–12)
POTASSIUM SERPL-SCNC: 5.4 MMOL/L (ref 3.5–5.2)
PROT SERPL-MCNC: 6.7 G/DL (ref 6–8.5)
RBC # BLD AUTO: 4.77 10*6/MM3 (ref 3.77–5.28)
SODIUM SERPL-SCNC: 137 MMOL/L (ref 136–145)
WBC NRBC COR # BLD AUTO: 8.75 10*3/MM3 (ref 3.4–10.8)

## 2024-12-05 PROCEDURE — 85025 COMPLETE CBC W/AUTO DIFF WBC: CPT

## 2024-12-05 PROCEDURE — 80053 COMPREHEN METABOLIC PANEL: CPT

## 2024-12-05 RX ORDER — SODIUM CHLORIDE 0.9 % (FLUSH) 0.9 %
10 SYRINGE (ML) INJECTION AS NEEDED
Status: CANCELLED | OUTPATIENT
Start: 2024-12-19

## 2024-12-05 RX ORDER — HEPARIN SODIUM (PORCINE) LOCK FLUSH IV SOLN 100 UNIT/ML 100 UNIT/ML
300 SOLUTION INTRAVENOUS ONCE
Status: CANCELLED | OUTPATIENT
Start: 2024-12-05

## 2024-12-05 RX ORDER — HEPARIN SODIUM (PORCINE) LOCK FLUSH IV SOLN 100 UNIT/ML 100 UNIT/ML
500 SOLUTION INTRAVENOUS AS NEEDED
Status: CANCELLED | OUTPATIENT
Start: 2024-12-19

## 2024-12-05 RX ORDER — SODIUM CHLORIDE 0.9 % (FLUSH) 0.9 %
10 SYRINGE (ML) INJECTION AS NEEDED
Status: DISCONTINUED | OUTPATIENT
Start: 2024-12-05 | End: 2025-04-03 | Stop reason: HOSPADM

## 2024-12-05 RX ORDER — SODIUM CHLORIDE 0.9 % (FLUSH) 0.9 %
20 SYRINGE (ML) INJECTION AS NEEDED
Status: CANCELLED | OUTPATIENT
Start: 2024-12-05

## 2024-12-05 RX ORDER — HEPARIN SODIUM (PORCINE) LOCK FLUSH IV SOLN 100 UNIT/ML 100 UNIT/ML
500 SOLUTION INTRAVENOUS AS NEEDED
Status: DISCONTINUED | OUTPATIENT
Start: 2024-12-05 | End: 2025-04-03 | Stop reason: HOSPADM

## 2024-12-05 NOTE — PROGRESS NOTES
DATE:  12/5/2024    DIAGNOSIS: Non-small cell lung cancer    CHIEF COMPLAINT:  fatigue    TREATMENT HISTORY:  Please see Dr. Bond's most recent office note (11/21/24) for full oncology history. she is being seen today for an acute visit.       HISTORY OF PRESENT ILLNESS:   Mayra Hays is a very pleasant 79 y.o. female who presented today for an acute visit.  Patient is here today with complaints of generalized fatigue.  She states that she had cycle 22 of her Imfinzi on 11/21 and then reported to the ED on 11/23 after developing a cough and shortness of breath.  She was diagnosed with pneumonia and was treated with IV antibiotics and later discharged on 11/27.  She states that since discharge her cough and shortness of breath have subsided.  She denies any fevers or chills.  No other concerning B symptoms since this time.  However, she just states today that she continues to feel very worn down and tired and wanted to stay home to rest today instead of getting her next cycle of Imfinzi as planned.  She does state that she has not been eating the best, because she gets full quickly, so she is eating small meals.  She does report hydrating well with water and Pedialyte. Otherwise, no other new or specific complaints today.         The following portions of the patient's history were reviewed and updated as appropriate: allergies, current medications, past family history, past medical history, past social history, past surgical history and problem list.    PAST MEDICAL HISTORY:  Past Medical History:   Diagnosis Date    Arthritis     CHF (congestive heart failure)     Chronic anticoagulation     Eliquis    Chronic kidney disease     stage II/IIIa    Collapsed lung     COPD (chronic obstructive pulmonary disease)     Diabetes mellitus     TYPE 2    Elevated cholesterol     GERD (gastroesophageal reflux disease)     Hyperlipidemia     Hypertension     Non-small cell lung cancer RUL     Sleep apnea     non  compliant with CPAP    Stroke 2019    Wears eyeglasses        PAST SURGICAL HISTORY:  Past Surgical History:   Procedure Laterality Date    BRONCHOSCOPY Bilateral 2023    Procedure: BRONCHOSCOPY WITH ENDOBRONCHIAL ULTRASOUND;  Surgeon: Abdulkadir Peter MD;  Location:  [facility] OR;  Service: Pulmonary;  Laterality: Bilateral;    BRONCHOSCOPY WITH ION ROBOTIC ASSIST N/A 2023    Procedure: BRONCHOSCOPY WITH ION ROBOT AND EBUS;  Surgeon: John Clemens MD;  Location:  [facility];  Service: Robotics - Pulmonary;  Laterality: N/A;  Ion cath #6 - 0032,  #6 - 0030, cath guide # 0077. EBUS scope removed with balloon intact.    CARDIAC CATHETERIZATION N/A 2017    Procedure: Left Heart Cath;  Surgeon: Jatinder Matias MD;  Location:  [facility] CATH INVASIVE LOCATION;  Service:     CARDIAC CATHETERIZATION N/A 2021    Procedure: Left Heart Cath;  Surgeon: Tolu Steinberg MD;  Location:  [facility] CATH INVASIVE LOC[facility];  Service: Cardiology;  Laterality: N/A;    COLONOSCOPY      ENDOSCOPY      GALLBLADDER SURGERY      PORTACATH PLACEMENT N/A 2023    Procedure: INSERTION OF PORTACATH;  Surgeon: Petr Velásquez MD;  Location:  [facility] OR;  Service: General;  Laterality: N/A;    VENTRAL HERNIA REPAIR N/A 2020    Procedure: VENTRAL HERNIA REPAIR LAPAROSCOPIC WITH DAVINCI ROBOT;  Surgeon: Petr Velásquez MD;  Location:  [facility] OR;  Service: DaVinci;  Laterality: N/A;       SOCIAL HISTORY:  Social History     Socioeconomic History    Marital status: Single    Number of children: 6   Tobacco Use    Smoking status: Former     Current packs/day: 0.00     Average packs/day: 3.0 packs/day for 16.7 years (50.2 ttl pk-yrs)     Types: Cigarettes     Start date: 4/3/1998     Quit date: 2015     Years since quittin.9     Passive exposure: Past    Smokeless tobacco: Never   Vaping Use    Vaping status: Never Used   Substance and Sexual Activity    Alcohol use: No    Drug use: No    Sexual  activity: Never       FAMILY HISTORY:  Family History   Problem Relation Age of Onset    Heart disease Mother         Rhianna cortes    Heart disease Father     Heart attack Father     Heart failure Father          MEDICATIONS:  The current medication list was reviewed in the EMR    Current Outpatient Medications:     albuterol sulfate  (90 Base) MCG/ACT inhaler, Inhale 2 puffs by mouth Every 4 (Four) Hours As Needed for Wheezing., Disp: 18 g, Rfl: 6    apixaban (ELIQUIS) 5 MG tablet tablet, Take 1 tablet by mouth Every 12 (Twelve) Hours., Disp: 180 tablet, Rfl: 3    atorvastatin (LIPITOR) 20 MG tablet, Take 1 tablet by mouth Daily., Disp: , Rfl:     Budeson-Glycopyrrol-Formoterol (Breztri Aerosphere) 160-9-4.8 MCG/ACT aerosol inhaler, Inhale 2 puffs by mouth 2 (Two) Times a Day., Disp: 10.7 g, Rfl: 6    esomeprazole (nexIUM) 20 MG capsule, Take 1 capsule by mouth Daily., Disp: , Rfl:     hydrOXYzine pamoate (VISTARIL) 25 MG capsule, Take 1 capsule by mouth Every Night., Disp: , Rfl:     levoFLOXacin (LEVAQUIN) 750 MG tablet, Take 1 tablet by mouth Every Other Day for 13 days. Indications: Pneumonia, Disp: 7 tablet, Rfl: 0    lisinopril (PRINIVIL,ZESTRIL) 10 MG tablet, Take 1 tablet by mouth Daily., Disp: 30 tablet, Rfl: 2    metFORMIN (GLUCOPHAGE) 500 MG tablet, Take 1 tablet by mouth 2 (Two) Times a Day As Needed (only takes if blood glucose is above 200). As needed, Disp: , Rfl:     minocycline (MINOCIN,DYNACIN) 100 MG capsule, Take 2 capsules by mouth Every 12 (Twelve) Hours for 13 days. Indications: Pneumonia, Disp: 52 capsule, Rfl: 0  No current facility-administered medications for this visit.    Facility-Administered Medications Ordered in Other Visits:     heparin injection 500 Units, 500 Units, Intravenous, PRN, Bailee Gan, APRN, 500 Units at 01/26/24 1558    heparin injection 500 Units, 500 Units, Intravenous, PRN, Bailee Gan, APRN, 500 Units at 11/21/24 1654    heparin injection  500 Units, 500 Units, Intravenous, PRN, Gilbert, Bailee Sharmaine, APRN    sodium chloride 0.9 % flush 10 mL, 10 mL, Intravenous, PRN, Gilbert, Bailee Sharmaine, APRN    sodium chloride 0.9 % flush 10 mL, 10 mL, Intravenous, PRN, Gilbert, Bailee Sharmaine, APRN, 10 mL at 11/21/24 1654    sodium chloride 0.9 % flush 10 mL, 10 mL, Intravenous, PRN, Gilbert, Bailee Sharmaine, APRN    ALLERGIES:    Allergies   Allergen Reactions    Paclitaxel Anaphylaxis         REVIEW OF SYSTEMS:    A comprehensive 14 point review of systems was performed.  Significant findings as mentioned above.  All other systems reviewed and are negative.      (2) Ambulatory and capable of self care, unable to carry out work activity, up and about > 50% or waking hours  Physical Exam   Vital Signs:   Vitals:    12/05/24 1429   BP: 136/75   Pulse: 80   Resp: 18   Temp: 97.3 °F (36.3 °C)   SpO2: 95%         General: Well developed, well nourished, alert and oriented x 3, in no acute distress.   Head: ATNC   Eyes: PERRL, No evidence of conjunctivitis.   Nose: No nasal discharge.   Mouth: Oral mucosal membranes moist. No oral ulceration or hemorrhages.   Neck: Neck supple. No thyromegaly. No JVD.   Lungs: Clear in all fields to A&P.  No wheezing.  Heart: S1, S2. Regular rate and rhythm. No murmurs, rubs, or gallops.   Abdomen: Soft. Bowel sounds are normoactive. Nontender with palpation. No Hepatosplenomegaly can be appreciated.   Extremities: No cyanosis or edema. Peripheral pulses palpable and +2 bilaterally.   Integumentary: No rash, sores, erythema or nodules. No blistering, bruising, or dry skin.   Neurologic: Alert, awake and oriented x 3.  Grossly nonfocal exam.    Pain Score:  Pain Score    12/05/24 1429   PainSc: 0-No pain       PHQ-Score Total:  PHQ-9 Total Score:         PATHOLOGY:        ENDOSCOPY:        IMAGING:  CT Abdomen Pelvis Without Contrast    Result Date: 11/23/2024  Multifocal pneumonia in the chest. No acute process noted in the abdomen or pelvis.           [id number], Zip code [address].   This report was finalized on 11/23/2024 4:34 PM by Dr. Claudio Rodgers MD.      CT Chest Without Contrast Diagnostic    Result Date: 11/23/2024  Multifocal pneumonia in the chest. No acute process noted in the abdomen or pelvis.          [id number], Zip code [id number].   This report was finalized on 11/23/2024 4:34 PM by Dr. Claudio Rodgers MD.      XR Chest 1 View    Result Date: 11/23/2024  Subsegmental atelectasis/airspace disease in the lung bases.  This report was finalized on 11/23/2024 3:33 PM by Alex Pallas, DO.      CT Chest With Contrast Diagnostic    Result Date: 11/11/2024  1.  Soft tissue density with volume loss and air bronchograms emanating from the right suprahilar aspect has features on CT most consistent with evolving fibrosis. 2.  No discrete pulmonary nodule or mass has developed in the right lung to suggest recurrent disease. 3.  No hilar or mediastinal adenopathy based on CT size criteria. 4.  Centrilobular nodules throughout the left mid-lower lung zones noted most consistent with atypical pneumonia or aspiration pneumonitis. 5.  Mild interstitial thickening favors mild edema in the setting of CHF or volume overload.   This report was finalized on 11/11/2024 12:06 PM by Dr. Kunal Castanon MD.           RECENT LABS:  Lab Results   Component Value Date    WBC 8.75 12/05/2024    HGB 12.6 12/05/2024    HCT 41.1 12/05/2024    MCV 86.2 12/05/2024    RDW 14.0 12/05/2024     12/05/2024    NEUTRORELPCT 66.3 12/05/2024    LYMPHORELPCT 20.9 12/05/2024    MONORELPCT 9.0 12/05/2024    EOSRELPCT 2.5 12/05/2024    BASORELPCT 0.7 12/05/2024    NEUTROABS 5.80 12/05/2024    LYMPHSABS 1.83 12/05/2024       Lab Results   Component Value Date     12/05/2024    K 5.4 (H) 12/05/2024    CO2 18.4 (L) 12/05/2024     (H) 12/05/2024    BUN 33 (H) 12/05/2024    CREATININE 1.19 (H) 12/05/2024    EGFRIFNONA 51 (L) 11/22/2021    GLUCOSE 118 (H) 12/05/2024    CALCIUM 8.6  12/05/2024    ALKPHOS 120 (H) 12/05/2024    AST 36 (H) 12/05/2024    ALT 37 (H) 12/05/2024    BILITOT 0.6 12/05/2024    ALBUMIN 3.4 (L) 12/05/2024    PROTEINTOT 6.7 12/05/2024    MG 1.9 11/23/2024    PHOS 3.5 05/18/2020       Lab Results   Component Value Date     (H) 08/16/2023       Lab Results   Component Value Date    FERRITIN 80.96 11/06/2024    IRON 91 11/06/2024    TIBC 390 11/06/2024    LABIRON 23 11/06/2024    WRFHGQIF68 548 08/14/2023    FOLATE 12.90 08/14/2023    HAPTOGLOBIN 182 08/16/2023    RETICCTPCT 4.26 (H) 08/16/2023    RETIC 0.1465 (H) 08/16/2023          ASSESSMENT & PLAN:  Mayra Hays is a very pleasant 79 y.o. female with    1.  Non-small cell lung cancer  -Please see Dr. Bond's most recent office note (11/21/24) for full oncology history. she is being seen today for an acute visit.   She had cycle 22 of her Imfinzi on 11/21.  Her next cycle was planned for today but patient would like to wait another 2 weeks before she takes another cycle due to issue #2.    2.  Fatigue  -Recently discharged from the hospital on 11/27.  She was inpatient with pneumonia and was treated with IV antibiotics.  Cough and shortness of breath symptoms have subsided.  Denies any fever or chills today.  -Reports fatigue today.  She does not want to get treatment today and would like to wait until her next cycle before continuing.  -Reports decreased nutrition.  Reports getting full quickly, so she has been eating small meals.  She has been hydrating well with water and Pedialyte.  -Offered to access the patient's port and draw labs and give IV fluids for supportive care, but patient does not want to do this today.  She did not put her EMLA cream on her port and she does not want to stay for fluids.  -Therefore, offered to draw labs peripherally for further evaluation and patient was agreeable to this.  -CBC and CMP unremarkable. (AST/ALT and K+ was mildly elevated but specimen was hemolyzed)  -Encouraged  boost for nutrition supplementation.  Boost samples provided to patient today.  -Encouraged continued hydration at home with water and Pedialyte.  -Will delay cycle 23 of Imfinzi per patient request x 2 weeks.  -Instructed the patient and her daughter-in-law that if symptoms were to become worse or not improve to let us know we will be happy to see her back at any time.  If she changes her mind and would like to supportive care with IV fluids we will be happy to do that at any time as well.      ACO / HERBER/Other  Quality measures  -  Mayra Hays did not receive 2024 flu vaccine.  This was recommended.  -  Mayra Hays reports a pain score of 0.    -  Current outpatient and discharge medications have been reconciled for the patient.  Reviewed by: ERASTO Guzman      A total of 30 minutes were spent coordinating this patient’s care in clinic today; more than 50% of this time was face-to-face with the patient, reviewing her interim medical history and counseling on the current treatment and followup plan. All questions were answered to her satisfaction.      Electronically Signed by: ERASTO Guzman  December 5, 2024 15:53 EST       CC:   No ref. provider found  Niki Antony APRN

## 2024-12-17 ENCOUNTER — READMISSION MANAGEMENT (OUTPATIENT)
Dept: CALL CENTER | Facility: HOSPITAL | Age: 79
End: 2024-12-17
Payer: MEDICARE

## 2024-12-17 NOTE — OUTREACH NOTE
Sepsis Week 3 Survey      Flowsheet Row Responses   [facility] patient discharged from? [place]   Does the patient have one of the following disease processes/diagnoses(primary or secondary)? Sepsis   Week 3 attempt successful? Yes   Call start time 1552   Call end time 1554   Discharge diagnosis Septic shock due to undetermined organism   Person spoke with today (if not patient) and relationship Hailey Donahues reviewed with patient/caregiver? Yes   Is the patient having any side effects they believe may be caused by any medication additions or changes? No   Does the patient have all medications related to this admission filled (includes all antibiotics, inhalers, nebulizers,steroids,etc.) Yes   Is the patient taking all medications as directed (includes completed medication regime)? Yes   Does the patient have a primary care provider?  Yes   Does the patient have an appointment with their PCP within 7 days of discharge? Yes   Has the patient kept scheduled appointments due by today? Yes   Has home health visited the patient within 72 hours of discharge? N/A   Psychosocial issues? No   Did the patient receive a copy of their discharge instructions? Yes   Nursing interventions Reviewed instructions with patient   What is the patient's perception of their health status since discharge? Improving   Is patient/caregiver able to teach back steps to recovery at home? Set small, achievable goals for return to baseline health, Rest and regain strength, Eat a balanced diet   Is the patient/caregiver able to teach back signs and symptoms of worsening condition: Fever, Rapid heart rate (>90), Altered mental status(confusion/coma), Edema, Shortness of breath/rapid respiratory rate   Week 3 call completed? Yes   Graduated Yes   Wrap up additional comments Pt doing well. Small appetite advised to offer protein shakes as a substitute.   Call end time 1554            Atiya TSAI - Registered Nurse

## 2024-12-23 ENCOUNTER — HOSPITAL ENCOUNTER (OUTPATIENT)
Dept: GENERAL RADIOLOGY | Facility: HOSPITAL | Age: 79
Discharge: HOME | End: 2024-12-23
Payer: MEDICARE

## 2024-12-23 ENCOUNTER — OFFICE VISIT (OUTPATIENT)
Dept: ONCOLOGY | Facility: CLINIC | Age: 79
End: 2024-12-23
Payer: MEDICARE

## 2024-12-23 VITALS
DIASTOLIC BLOOD PRESSURE: 76 MMHG | HEART RATE: 79 BPM | OXYGEN SATURATION: 94 % | SYSTOLIC BLOOD PRESSURE: 146 MMHG | RESPIRATION RATE: 18 BRPM | TEMPERATURE: 97.7 F

## 2024-12-23 DIAGNOSIS — J44.9 CHRONIC OBSTRUCTIVE PULMONARY DISEASE, UNSPECIFIED COPD TYPE: ICD-10-CM

## 2024-12-23 DIAGNOSIS — C34.91 NON-SMALL CELL CANCER OF RIGHT LUNG: Primary | ICD-10-CM

## 2024-12-23 DIAGNOSIS — C34.91 NON-SMALL CELL CANCER OF RIGHT LUNG: ICD-10-CM

## 2024-12-23 DIAGNOSIS — R09.81 NASAL CONGESTION: ICD-10-CM

## 2024-12-23 DIAGNOSIS — B34.8 RHINOVIRUS: ICD-10-CM

## 2024-12-23 DIAGNOSIS — R05.9 COUGH, UNSPECIFIED TYPE: ICD-10-CM

## 2024-12-23 DIAGNOSIS — R06.2 WHEEZING: ICD-10-CM

## 2024-12-23 LAB
ALBUMIN SERPL-MCNC: 3.5 G/DL (ref 3.5–5.2)
ALBUMIN/GLOB SERPL: 1.1 G/DL
ALP SERPL-CCNC: 115 U/L (ref 39–117)
ALT SERPL W P-5'-P-CCNC: 24 U/L (ref 1–33)
ANION GAP SERPL CALCULATED.3IONS-SCNC: 8.3 MMOL/L (ref 5–15)
AST SERPL-CCNC: 27 U/L (ref 1–32)
B PARAPERT DNA SPEC QL NAA+PROBE: NOT DETECTED
B PERT DNA SPEC QL NAA+PROBE: NOT DETECTED
BASOPHILS # BLD AUTO: 0.08 10*3/MM3 (ref 0–0.2)
BASOPHILS NFR BLD AUTO: 1.3 % (ref 0–1.5)
BILIRUB SERPL-MCNC: 0.3 MG/DL (ref 0–1.2)
BUN SERPL-MCNC: 10 MG/DL (ref 8–23)
BUN/CREAT SERPL: 10.4 (ref 7–25)
C PNEUM DNA NPH QL NAA+NON-PROBE: NOT DETECTED
CALCIUM SPEC-SCNC: 9 MG/DL (ref 8.6–10.5)
CHLORIDE SERPL-SCNC: 103 MMOL/L (ref 98–107)
CO2 SERPL-SCNC: 26.7 MMOL/L (ref 22–29)
CREAT SERPL-MCNC: 0.96 MG/DL (ref 0.57–1)
DEPRECATED RDW RBC AUTO: 47.2 FL (ref 37–54)
EGFRCR SERPLBLD CKD-EPI 2021: 60.7 ML/MIN/1.73
EOSINOPHIL # BLD AUTO: 0.21 10*3/MM3 (ref 0–0.4)
EOSINOPHIL NFR BLD AUTO: 3.3 % (ref 0.3–6.2)
ERYTHROCYTE [DISTWIDTH] IN BLOOD BY AUTOMATED COUNT: 14.8 % (ref 12.3–15.4)
FLUAV SUBTYP SPEC NAA+PROBE: NOT DETECTED
FLUBV RNA ISLT QL NAA+PROBE: NOT DETECTED
GLOBULIN UR ELPH-MCNC: 3.2 GM/DL
GLUCOSE SERPL-MCNC: 123 MG/DL (ref 65–99)
HADV DNA SPEC NAA+PROBE: NOT DETECTED
HCOV 229E RNA SPEC QL NAA+PROBE: NOT DETECTED
HCOV HKU1 RNA SPEC QL NAA+PROBE: NOT DETECTED
HCOV NL63 RNA SPEC QL NAA+PROBE: NOT DETECTED
HCOV OC43 RNA SPEC QL NAA+PROBE: NOT DETECTED
HCT VFR BLD AUTO: 34.2 % (ref 34–46.6)
HGB BLD-MCNC: 10.3 G/DL (ref 12–15.9)
HMPV RNA NPH QL NAA+NON-PROBE: NOT DETECTED
HPIV1 RNA ISLT QL NAA+PROBE: NOT DETECTED
HPIV2 RNA SPEC QL NAA+PROBE: NOT DETECTED
HPIV3 RNA NPH QL NAA+PROBE: NOT DETECTED
HPIV4 P GENE NPH QL NAA+PROBE: NOT DETECTED
IMM GRANULOCYTES # BLD AUTO: 0.16 10*3/MM3 (ref 0–0.05)
IMM GRANULOCYTES NFR BLD AUTO: 2.5 % (ref 0–0.5)
LYMPHOCYTES # BLD AUTO: 0.92 10*3/MM3 (ref 0.7–3.1)
LYMPHOCYTES NFR BLD AUTO: 14.5 % (ref 19.6–45.3)
M PNEUMO IGG SER IA-ACNC: NOT DETECTED
MCH RBC QN AUTO: 26.3 PG (ref 26.6–33)
MCHC RBC AUTO-ENTMCNC: 30.1 G/DL (ref 31.5–35.7)
MCV RBC AUTO: 87.2 FL (ref 79–97)
MONOCYTES # BLD AUTO: 0.75 10*3/MM3 (ref 0.1–0.9)
MONOCYTES NFR BLD AUTO: 11.8 % (ref 5–12)
NEUTROPHILS NFR BLD AUTO: 4.23 10*3/MM3 (ref 1.7–7)
NEUTROPHILS NFR BLD AUTO: 66.6 % (ref 42.7–76)
NRBC BLD AUTO-RTO: 0 /100 WBC (ref 0–0.2)
PLATELET # BLD AUTO: 268 10*3/MM3 (ref 140–450)
PMV BLD AUTO: 11.4 FL (ref 6–12)
POTASSIUM SERPL-SCNC: 4.2 MMOL/L (ref 3.5–5.2)
PROT SERPL-MCNC: 6.7 G/DL (ref 6–8.5)
RBC # BLD AUTO: 3.92 10*6/MM3 (ref 3.77–5.28)
RHINOVIRUS RNA SPEC NAA+PROBE: DETECTED
RSV RNA NPH QL NAA+NON-PROBE: NOT DETECTED
SARS-COV-2 RNA RESP QL NAA+PROBE: NOT DETECTED
SODIUM SERPL-SCNC: 138 MMOL/L (ref 136–145)
WBC NRBC COR # BLD AUTO: 6.35 10*3/MM3 (ref 3.4–10.8)

## 2024-12-23 PROCEDURE — 3078F DIAST BP <80 MM HG: CPT

## 2024-12-23 PROCEDURE — 99214 OFFICE O/P EST MOD 30 MIN: CPT

## 2024-12-23 PROCEDURE — 3077F SYST BP >= 140 MM HG: CPT

## 2024-12-23 PROCEDURE — 71046 X-RAY EXAM CHEST 2 VIEWS: CPT

## 2024-12-23 PROCEDURE — 1159F MED LIST DOCD IN RCRD: CPT

## 2024-12-23 PROCEDURE — 71046 X-RAY EXAM CHEST 2 VIEWS: CPT | Performed by: RADIOLOGY

## 2024-12-23 PROCEDURE — 85025 COMPLETE CBC W/AUTO DIFF WBC: CPT

## 2024-12-23 PROCEDURE — 1126F AMNT PAIN NOTED NONE PRSNT: CPT

## 2024-12-23 PROCEDURE — 0202U NFCT DS 22 TRGT SARS-COV-2: CPT

## 2024-12-23 PROCEDURE — 80053 COMPREHEN METABOLIC PANEL: CPT

## 2024-12-23 PROCEDURE — 1160F RVW MEDS BY RX/DR IN RCRD: CPT

## 2024-12-23 RX ORDER — BENZONATATE 100 MG/1
100 CAPSULE ORAL 3 TIMES DAILY PRN
Qty: 30 CAPSULE | Refills: 0 | Status: SHIPPED | OUTPATIENT
Start: 2024-12-23

## 2024-12-23 NOTE — PROGRESS NOTES
DATE:  12/23/2024    DIAGNOSIS: Non-small cell lung cancer    CHIEF COMPLAINT:  Cough, congestion    TREATMENT HISTORY:  Please see Dr. Bond's most recent office note (11/21/24) for full oncology history. she is being seen today for an acute visit.       HISTORY OF PRESENT ILLNESS:   Mayra Hays is a very pleasant 79 y.o. female who presented today for an acute visit.  She presents today with complaints of congestion and a cough with thick white sputum production.  She states that her voice has also been hoarse due to the coughing.  She does endorse continued weakness/fatigue since discharge from the hospital, but she states this is getting better.  Denies any fever/chills.  No known sick contacts.  She was recently released from the hospital on 11/27 where she was admitted with pneumonia and discharged on oral antibiotics.  She has since completed these antibiotics.  She reports eating and drinking well today.  She is otherwise without any other specific complaints.      The following portions of the patient's history were reviewed and updated as appropriate: allergies, current medications, past family history, past medical history, past social history, past surgical history and problem list.    PAST MEDICAL HISTORY:  Past Medical History:   Diagnosis Date    Arthritis     CHF (congestive heart failure)     Chronic anticoagulation     Eliquis    Chronic kidney disease     stage II/IIIa    Collapsed lung     COPD (chronic obstructive pulmonary disease)     Diabetes mellitus     TYPE 2    Elevated cholesterol     GERD (gastroesophageal reflux disease)     Hyperlipidemia     Hypertension     Non-small cell lung cancer RUL     Sleep apnea     non compliant with CPAP    Stroke 2019    Wears eyeglasses        PAST SURGICAL HISTORY:  Past Surgical History:   Procedure Laterality Date    BRONCHOSCOPY Bilateral 02/27/2023    Procedure: BRONCHOSCOPY WITH ENDOBRONCHIAL ULTRASOUND;  Surgeon: Abdulkadir Peter MD;   Location:  [facility];  Service: Pulmonary;  Laterality: Bilateral;    BRONCHOSCOPY WITH ION ROBOTIC ASSIST N/A 2023    Procedure: BRONCHOSCOPY WITH ION ROBOT AND EBUS;  Surgeon: John Clemens MD;  Location:  [facility];  Service: Robotics - Pulmonary;  Laterality: N/A;  Ion cath #6 - 0032,  #6 - 0030, cath guide # 0077. EBUS scope removed with balloon intact.    CARDIAC CATHETERIZATION N/A 2017    Procedure: Left Heart Cath;  Surgeon: Jatinder Matias MD;  Location:  [facility];  Service:     CARDIAC CATHETERIZATION N/A 2021    Procedure: Left Heart Cath;  Surgeon: Tolu Steinberg MD;  Location:  [facility];  Service: Cardiology;  Laterality: N/A;    COLONOSCOPY      ENDOSCOPY      GALLBLADDER SURGERY      PORTACATH PLACEMENT N/A 2023    Procedure: INSERTION OF PORTACATH;  Surgeon: Petr Velásquez MD;  Location:  [facility];  Service: General;  Laterality: N/A;    VENTRAL HERNIA REPAIR N/A 2020    Procedure: VENTRAL HERNIA REPAIR LAPAROSCOPIC WITH DAVINCI ROBOT;  Surgeon: Petr Velásquez MD;  Location:  [facility];  Service: DaVinci;  Laterality: N/A;       SOCIAL HISTORY:  Social History     Socioeconomic History    Marital status: Single    Number of children: 6   Tobacco Use    Smoking status: Former     Current packs/day: 0.00     Average packs/day: 3.0 packs/day for 16.7 years (50.2 ttl pk-yrs)     Types: Cigarettes     Start date: 4/3/1998     Quit date: 2015     Years since quittin.9     Passive exposure: Past    Smokeless tobacco: Never   Vaping Use    Vaping status: Never Used   Substance and Sexual Activity    Alcohol use: No    Drug use: No    Sexual activity: Never         FAMILY HISTORY:  Family History   Problem Relation Age of Onset    Heart disease Mother         Rhianna sophia    Heart disease Father     Heart attack Father     Heart failure Father          MEDICATIONS:  The current medication list was  reviewed in the EMR    Current Outpatient Medications:     albuterol sulfate  (90 Base) MCG/ACT inhaler, Inhale 2 puffs by mouth Every 4 (Four) Hours As Needed for Wheezing., Disp: 18 g, Rfl: 6    apixaban (ELIQUIS) 5 MG tablet tablet, Take 1 tablet by mouth Every 12 (Twelve) Hours., Disp: 180 tablet, Rfl: 3    atorvastatin (LIPITOR) 20 MG tablet, Take 1 tablet by mouth Daily., Disp: , Rfl:     Budeson-Glycopyrrol-Formoterol (Breztri Aerosphere) 160-9-4.8 MCG/ACT aerosol inhaler, Inhale 2 puffs by mouth 2 (Two) Times a Day., Disp: 10.7 g, Rfl: 6    esomeprazole (nexIUM) 20 MG capsule, Take 1 capsule by mouth Daily., Disp: , Rfl:     hydrOXYzine pamoate (VISTARIL) 25 MG capsule, Take 1 capsule by mouth Every Night., Disp: , Rfl:     lisinopril (PRINIVIL,ZESTRIL) 10 MG tablet, Take 1 tablet by mouth Daily., Disp: 30 tablet, Rfl: 2    metFORMIN (GLUCOPHAGE) 500 MG tablet, Take 1 tablet by mouth 2 (Two) Times a Day As Needed (only takes if blood glucose is above 200). As needed, Disp: , Rfl:   No current facility-administered medications for this visit.    Facility-Administered Medications Ordered in Other Visits:     heparin injection 500 Units, 500 Units, Intravenous, PRN, Sergeibert, Bailee Sharmaine, APRN, 500 Units at 01/26/24 1558    heparin injection 500 Units, 500 Units, Intravenous, PRN, Gilbert, Bailee Sharmaine, APRN, 500 Units at 11/21/24 1654    heparin injection 500 Units, 500 Units, Intravenous, PRN, Gilbert, Bailee Sharmaine, APRN    sodium chloride 0.9 % flush 10 mL, 10 mL, Intravenous, PRN, Sergeibert, Bailee Sharmaine, APRN    sodium chloride 0.9 % flush 10 mL, 10 mL, Intravenous, PRN, Gilbert, Bailee Sharmaine, APRN, 10 mL at 11/21/24 1654    sodium chloride 0.9 % flush 10 mL, 10 mL, Intravenous, PRN, Gilbert, Bailee Sharmaine, APRN    ALLERGIES:    Allergies   Allergen Reactions    Paclitaxel Anaphylaxis         REVIEW OF SYSTEMS:    A comprehensive 14 point review of systems was performed.  Significant findings as mentioned  above.  All other systems reviewed and are negative.      (2) Ambulatory and capable of self care, unable to carry out work activity, up and about > 50% or waking hours  Physical Exam   Vital Signs:   Vitals:    12/23/24 1032   BP: 146/76   Pulse: 79   Resp: 18   Temp: 97.7 °F (36.5 °C)   SpO2: 94%         General: Well developed, well nourished, alert and oriented x 3, in no acute distress.   Head: ATNC   Eyes: PERRL, No evidence of conjunctivitis.   Nose: No nasal discharge.   Mouth: Oral mucosal membranes moist. No oral ulceration or hemorrhages.   Neck: Neck supple. No thyromegaly. No JVD.   Lungs: Clear to left lung. Wheezing noted to right lung.   Heart: S1, S2. Regular rate and rhythm. No murmurs, rubs, or gallops.   Abdomen: Soft. Bowel sounds are normoactive. Nontender with palpation. No Hepatosplenomegaly can be appreciated.   Extremities: No cyanosis or edema. Peripheral pulses palpable and +2 bilaterally.   Integumentary: No rash, sores, erythema or nodules. No blistering, bruising, or dry skin.   Neurologic: Alert, awake and oriented x 3.  Grossly nonfocal exam.    Pain Score:  Pain Score    12/23/24 1032   PainSc: 0-No pain       PHQ-Score Total:  PHQ-9 Total Score:         PATHOLOGY:        ENDOSCOPY:        IMAGING:  XR Chest PA & Lateral    Result Date: 12/23/2024  1.  Tiny bilateral pleural effusions. 2.  Bibasilar atelectasis or minimal airspace disease. 3.  Right upper lobe atelectasis and associated opacity noted. Recommend follow-up to ensure resolution. 4.  Right Port-A-Cath with tip in SVC.  This report was finalized on 12/23/2024 1:15 PM by Dr. Bill Parham MD.      CT Abdomen Pelvis Without Contrast    Result Date: 11/23/2024  Multifocal pneumonia in the chest. No acute process noted in the abdomen or pelvis.          [id number], Zip code [id number].   This report was finalized on 11/23/2024 4:34 PM by Dr. Claudio Rodgers MD.      CT Chest Without Contrast Diagnostic    Result Date:  11/23/2024  Multifocal pneumonia in the chest. No acute process noted in the abdomen or pelvis.          [id number], Zip code [id number].   This report was finalized on 11/23/2024 4:34 PM by Dr. Claudio Rodgers MD.      XR Chest 1 View    Result Date: 11/23/2024  Subsegmental atelectasis/airspace disease in the lung bases.  This report was finalized on 11/23/2024 3:33 PM by Alex Pallas, DO.      CT Chest With Contrast Diagnostic    Result Date: 11/11/2024  1.  Soft tissue density with volume loss and air bronchograms emanating from the right suprahilar aspect has features on CT most consistent with evolving fibrosis. 2.  No discrete pulmonary nodule or mass has developed in the right lung to suggest recurrent disease. 3.  No hilar or mediastinal adenopathy based on CT size criteria. 4.  Centrilobular nodules throughout the left mid-lower lung zones noted most consistent with atypical pneumonia or aspiration pneumonitis. 5.  Mild interstitial thickening favors mild edema in the setting of CHF or volume overload.   This report was finalized on 11/11/2024 12:06 PM by Dr. Kunal Castanon MD.           RECENT LABS:  Lab Results   Component Value Date    WBC 6.35 12/23/2024    HGB 10.3 (L) 12/23/2024    HCT 34.2 12/23/2024    MCV 87.2 12/23/2024    RDW 14.8 12/23/2024     12/23/2024    NEUTRORELPCT 66.6 12/23/2024    LYMPHORELPCT 14.5 (L) 12/23/2024    MONORELPCT 11.8 12/23/2024    EOSRELPCT 3.3 12/23/2024    BASORELPCT 1.3 12/23/2024    NEUTROABS 4.23 12/23/2024    LYMPHSABS 0.92 12/23/2024       Lab Results   Component Value Date     12/23/2024    K 4.2 12/23/2024    CO2 26.7 12/23/2024     12/23/2024    BUN 10 12/23/2024    CREATININE 0.96 12/23/2024    EGFRIFNONA 51 (L) 11/22/2021    GLUCOSE 123 (H) 12/23/2024    CALCIUM 9.0 12/23/2024    ALKPHOS 115 12/23/2024    AST 27 12/23/2024    ALT 24 12/23/2024    BILITOT 0.3 12/23/2024    ALBUMIN 3.5 12/23/2024    PROTEINTOT 6.7 12/23/2024    MG 1.9 11/23/2024     PHOS 3.5 05/18/2020       Lab Results   Component Value Date     (H) 08/16/2023       Lab Results   Component Value Date    FERRITIN 80.96 11/06/2024    IRON 91 11/06/2024    TIBC 390 11/06/2024    LABIRON 23 11/06/2024    JRYCUPRU72 548 08/14/2023    FOLATE 12.90 08/14/2023    HAPTOGLOBIN 182 08/16/2023    RETICCTPCT 4.26 (H) 08/16/2023    RETIC 0.1465 (H) 08/16/2023          ASSESSMENT & PLAN:  Mayra Hays is a very pleasant 79 y.o. female with    1.  Non-small cell lung cancer  -Please see Dr. Bond's most recent office note (11/21/24) for full oncology history. she is being seen today for an acute visit.   She will follow-up with Dr. Bond as scheduled on 1/3/2025.    2.  Rhinovirus  -Presented today with congestion, weakness/fatigue, and productive cough with thick white sputum production, and hoarse voice.  -Recently discharged from the hospital on 11/27 where she was admitted with pneumonia and discharged on oral antibiotics.  She has since completed these antibiotics.  Reports improvement in symptoms since discharge, but not complete resolution.  -Denies fever/chills.  No known sick contacts.  She is eating and drinking well.  -CBC and CMP unremarkable.  -Respiratory panel positive for rhinovirus.  -Chest x-ray also performed, results most likely consistent with recovering pneumonia.  -Educated patient on supportive care with rest, good oral hydration and over-the-counter decongestants.  Rx for Tessalon Perles given to help with cough.  Educated on deep breathing to help with recovery from recent pneumonia.  -Instructed patient that if she were to develop any fever/chills or any worsening symptoms to let us know we will be happy to see her back at any time, symptoms today most likely related to rhinovirus positivity.  She will follow-up with Dr. Bond on 1/3/2025.  Dr. Bond was informed on patient's condition today and results.        ACO / HERBER/Other  Quality measures  -  Mayra  Saúl did not receive 2024 flu vaccine.  This was recommended.  -  Mayra Hays reports a pain score of 0.    -  Current outpatient and discharge medications have been reconciled for the patient.  Reviewed by: ERASTO Guzman      A total of 30 minutes were spent coordinating this patient’s care in clinic today; more than 50% of this time was face-to-face with the patient, reviewing her interim medical history and counseling on the current treatment and followup plan. All questions were answered to her satisfaction.      Electronically Signed by: ERASTO Guzman December 23, 2024 14:59 EST       CC:   No ref. provider found  Niki Antony APRN

## 2024-12-30 DIAGNOSIS — J44.9 CHRONIC OBSTRUCTIVE PULMONARY DISEASE, UNSPECIFIED COPD TYPE: ICD-10-CM

## 2024-12-30 RX ORDER — BUDESONIDE, GLYCOPYRROLATE, AND FORMOTEROL FUMARATE 160; 9; 4.8 UG/1; UG/1; UG/1
2 AEROSOL, METERED RESPIRATORY (INHALATION) 2 TIMES DAILY
Qty: 32.1 G | Refills: 3 | Status: SHIPPED | OUTPATIENT
Start: 2024-12-30

## 2025-01-03 ENCOUNTER — OFFICE VISIT (OUTPATIENT)
Dept: ONCOLOGY | Facility: CLINIC | Age: 80
End: 2025-01-03
Payer: MEDICARE

## 2025-01-03 ENCOUNTER — LAB (OUTPATIENT)
Dept: ONCOLOGY | Facility: HOSPITAL | Age: 80
End: 2025-01-03
Payer: MEDICARE

## 2025-01-03 ENCOUNTER — INFUSION (OUTPATIENT)
Dept: ONCOLOGY | Facility: HOSPITAL | Age: 80
End: 2025-01-03
Payer: MEDICARE

## 2025-01-03 VITALS
TEMPERATURE: 97.5 F | HEART RATE: 84 BPM | RESPIRATION RATE: 20 BRPM | OXYGEN SATURATION: 92 % | SYSTOLIC BLOOD PRESSURE: 138 MMHG | DIASTOLIC BLOOD PRESSURE: 70 MMHG

## 2025-01-03 DIAGNOSIS — C34.11 PRIMARY CANCER OF RIGHT UPPER LOBE OF LUNG: ICD-10-CM

## 2025-01-03 DIAGNOSIS — C34.91 NON-SMALL CELL CANCER OF RIGHT LUNG: ICD-10-CM

## 2025-01-03 DIAGNOSIS — D50.9 IRON DEFICIENCY ANEMIA, UNSPECIFIED IRON DEFICIENCY ANEMIA TYPE: ICD-10-CM

## 2025-01-03 DIAGNOSIS — Z95.828 PORT-A-CATH IN PLACE: ICD-10-CM

## 2025-01-03 DIAGNOSIS — C34.91 NON-SMALL CELL CANCER OF RIGHT LUNG: Primary | ICD-10-CM

## 2025-01-03 DIAGNOSIS — J44.9 CHRONIC OBSTRUCTIVE PULMONARY DISEASE, UNSPECIFIED COPD TYPE: ICD-10-CM

## 2025-01-03 DIAGNOSIS — K90.49 MALABSORPTION DUE TO INTOLERANCE, NOT ELSEWHERE CLASSIFIED: ICD-10-CM

## 2025-01-03 DIAGNOSIS — R53.83 OTHER FATIGUE: ICD-10-CM

## 2025-01-03 DIAGNOSIS — C34.11 PRIMARY CANCER OF RIGHT UPPER LOBE OF LUNG: Primary | ICD-10-CM

## 2025-01-03 DIAGNOSIS — B34.8 RHINOVIRUS: ICD-10-CM

## 2025-01-03 LAB
ALBUMIN SERPL-MCNC: 3.6 G/DL (ref 3.5–5.2)
ALBUMIN/GLOB SERPL: 1.1 G/DL
ALP SERPL-CCNC: 108 U/L (ref 39–117)
ALT SERPL W P-5'-P-CCNC: 13 U/L (ref 1–33)
ANION GAP SERPL CALCULATED.3IONS-SCNC: 10.4 MMOL/L (ref 5–15)
AST SERPL-CCNC: 19 U/L (ref 1–32)
BASOPHILS # BLD AUTO: 0.05 10*3/MM3 (ref 0–0.2)
BASOPHILS NFR BLD AUTO: 0.7 % (ref 0–1.5)
BILIRUB SERPL-MCNC: 0.3 MG/DL (ref 0–1.2)
BUN SERPL-MCNC: 13 MG/DL (ref 8–23)
BUN/CREAT SERPL: 11.6 (ref 7–25)
CALCIUM SPEC-SCNC: 9 MG/DL (ref 8.6–10.5)
CHLORIDE SERPL-SCNC: 104 MMOL/L (ref 98–107)
CO2 SERPL-SCNC: 27.6 MMOL/L (ref 22–29)
CREAT SERPL-MCNC: 1.12 MG/DL (ref 0.57–1)
DEPRECATED RDW RBC AUTO: 47.8 FL (ref 37–54)
EGFRCR SERPLBLD CKD-EPI 2021: 50.4 ML/MIN/1.73
EOSINOPHIL # BLD AUTO: 0.23 10*3/MM3 (ref 0–0.4)
EOSINOPHIL NFR BLD AUTO: 3.2 % (ref 0.3–6.2)
ERYTHROCYTE [DISTWIDTH] IN BLOOD BY AUTOMATED COUNT: 14.8 % (ref 12.3–15.4)
GLOBULIN UR ELPH-MCNC: 3.3 GM/DL
GLUCOSE SERPL-MCNC: 222 MG/DL (ref 65–99)
HCT VFR BLD AUTO: 34 % (ref 34–46.6)
HGB BLD-MCNC: 10.3 G/DL (ref 12–15.9)
IMM GRANULOCYTES # BLD AUTO: 0.04 10*3/MM3 (ref 0–0.05)
IMM GRANULOCYTES NFR BLD AUTO: 0.5 % (ref 0–0.5)
LYMPHOCYTES # BLD AUTO: 0.79 10*3/MM3 (ref 0.7–3.1)
LYMPHOCYTES NFR BLD AUTO: 10.8 % (ref 19.6–45.3)
MCH RBC QN AUTO: 26.6 PG (ref 26.6–33)
MCHC RBC AUTO-ENTMCNC: 30.3 G/DL (ref 31.5–35.7)
MCV RBC AUTO: 87.9 FL (ref 79–97)
MONOCYTES # BLD AUTO: 0.47 10*3/MM3 (ref 0.1–0.9)
MONOCYTES NFR BLD AUTO: 6.4 % (ref 5–12)
NEUTROPHILS NFR BLD AUTO: 5.72 10*3/MM3 (ref 1.7–7)
NEUTROPHILS NFR BLD AUTO: 78.4 % (ref 42.7–76)
NRBC BLD AUTO-RTO: 0 /100 WBC (ref 0–0.2)
PLATELET # BLD AUTO: 199 10*3/MM3 (ref 140–450)
PMV BLD AUTO: 10.9 FL (ref 6–12)
POTASSIUM SERPL-SCNC: 3.8 MMOL/L (ref 3.5–5.2)
PROT SERPL-MCNC: 6.9 G/DL (ref 6–8.5)
RBC # BLD AUTO: 3.87 10*6/MM3 (ref 3.77–5.28)
SODIUM SERPL-SCNC: 142 MMOL/L (ref 136–145)
WBC NRBC COR # BLD AUTO: 7.3 10*3/MM3 (ref 3.4–10.8)

## 2025-01-03 PROCEDURE — 25010000002 HEPARIN LOCK FLUSH PER 10 UNITS: Performed by: NURSE PRACTITIONER

## 2025-01-03 PROCEDURE — 25010000002 DURVALUMAB 500 MG/10ML SOLUTION 10 ML VIAL: Performed by: INTERNAL MEDICINE

## 2025-01-03 PROCEDURE — 85025 COMPLETE CBC W/AUTO DIFF WBC: CPT

## 2025-01-03 PROCEDURE — 80053 COMPREHEN METABOLIC PANEL: CPT

## 2025-01-03 PROCEDURE — 25810000003 SODIUM CHLORIDE 0.9 % SOLUTION 250 ML FLEX CONT: Performed by: INTERNAL MEDICINE

## 2025-01-03 PROCEDURE — 96413 CHEMO IV INFUSION 1 HR: CPT

## 2025-01-03 RX ORDER — HEPARIN SODIUM (PORCINE) LOCK FLUSH IV SOLN 100 UNIT/ML 100 UNIT/ML
300 SOLUTION INTRAVENOUS ONCE
OUTPATIENT
Start: 2025-01-03

## 2025-01-03 RX ORDER — HEPARIN SODIUM (PORCINE) LOCK FLUSH IV SOLN 100 UNIT/ML 100 UNIT/ML
500 SOLUTION INTRAVENOUS AS NEEDED
OUTPATIENT
Start: 2025-01-03

## 2025-01-03 RX ORDER — SODIUM CHLORIDE 0.9 % (FLUSH) 0.9 %
20 SYRINGE (ML) INJECTION AS NEEDED
OUTPATIENT
Start: 2025-01-03

## 2025-01-03 RX ORDER — SODIUM CHLORIDE 0.9 % (FLUSH) 0.9 %
10 SYRINGE (ML) INJECTION AS NEEDED
Status: DISCONTINUED | OUTPATIENT
Start: 2025-01-03 | End: 2025-01-03 | Stop reason: HOSPADM

## 2025-01-03 RX ORDER — HEPARIN SODIUM (PORCINE) LOCK FLUSH IV SOLN 100 UNIT/ML 100 UNIT/ML
500 SOLUTION INTRAVENOUS AS NEEDED
Status: DISCONTINUED | OUTPATIENT
Start: 2025-01-03 | End: 2025-01-03 | Stop reason: HOSPADM

## 2025-01-03 RX ORDER — SODIUM CHLORIDE 0.9 % (FLUSH) 0.9 %
10 SYRINGE (ML) INJECTION AS NEEDED
OUTPATIENT
Start: 2025-01-03

## 2025-01-03 RX ORDER — SODIUM CHLORIDE 9 MG/ML
250 INJECTION, SOLUTION INTRAVENOUS ONCE
Status: DISCONTINUED | OUTPATIENT
Start: 2025-01-03 | End: 2025-01-03 | Stop reason: RX

## 2025-01-03 RX ADMIN — HEPARIN 500 UNITS: 100 SYRINGE at 15:35

## 2025-01-03 RX ADMIN — SODIUM CHLORIDE 1000 MG: 9 INJECTION, SOLUTION INTRAVENOUS at 14:32

## 2025-01-03 NOTE — PROGRESS NOTES
Subjective     Date: 1/3/2025    Referring Provider  No ref. provider found    Chief Complaint  Symptomatic anemia   2. Non small Cell Lung Cancer  Cancer Staging   Stage IIIA (cT1c, cN2, cM0)        Subjective      Mayra Hays is a 79 y.o. female who presents today to [facility] for follow up.    HPI:   79 y.o. female with past medical history of diabetes mellitus type 2, hyperlipidemia, COPD, hypertension, atrial fibrillation on anticoagulation (Eliquis), h/o CVA and previous tobacco use presents for symptomatic anemia and NSCLC.    She is present today with her daughter, Hailey, who is assisting with history. Pt started work up for RUL lung nodule , since then noticed to have a decrease in her Hgb to 10.7 from normal in 2023. More recently, her Hgb 8/9 was 6.5, she was directed to ED where she received 1U PRBC.     Patient reports increased fatigue over the last several months. She reports no bleeding, however does report dark colored stool (melena) two days ago. Denies any other evidence of melena or hematochezia.     She has previously been diagnosed with iron deficiency (years ago) requiring oral iron supplements, but never received IV iron or bone marrow biopsy.    She and her daughter are unsure of her last colonoscopy and endoscopy history, think it may have occurred >5 years ago but unsure.    She denies recent weight loss, fever, chills,  night sweats.  Previous smoker, quit 5-6 years ago, smoked 3 packs/day X 30 years. Denies alcohol or drug use. Family history significant for son with leukemia, brothers with lung cancer.     Oncology History:  2023: low dose CT chest screenin.9 cm spiculated right upper lobe pulmonary nodule concerning for malignancy, PET/CT recommended.   2023: EBUS by Dr. Peter negative for malignancy for bronchial washing and FNA of station 10R  2023: PET/CT: Right upper lobe pulmonary nodule, more  posterior possible satellite nodules are postobstructive process measuring 2.1 cm with mSUV 14.5. Also with pretracheal region lymph node 2.6 cm.   6/29/2023: CT guided needle biopsy was non diagnostic, showed fibrosis and chronic inflammation.   8/9/2023: Lab work showed anemia with Hgb 6.5. Referred to ED. Bronchoscopy deferred.   9/6/2023: Navigational bronchoscopy performed by Dr. Clemens- Right upper lobe lung transbronchial biopsy revealed squamous cell carcinoma, lymph node at station 4R also involved with squamous cell carcinoma. PD-L1 TPS 60%. Patient not deemed a surgical candidate.  9/13-11/21/2023: Received weekly carboplatin  X 7 and radiation. Patient had a reaction to paclitaxel, it was omitted.   12/19/2023: Post treatment CT chest with improvement of masslike process RUL now 1.6 cm, suggest significant response to treatment. Mediastinal lymph nodes are now within normal limits of size.     Ms. Hays presents today with her daughter, grand daughter, and great grand daughters. She is in a wheelchair. She lives alone with her grand son, able to perform her ADLs including cooking, cleaning.     Treatment history:        2.       Interval History 10/11/2023   Ms. Hays presents for follow up today, accompanied by her daughter in law. She started treatment with paclitaxel and carboplatin on 10/9, unfortunately had a anaphylactic reaction to taxol after 8 minutes of infusion causing her to go to the ED. Patient was awake and feeling back to herself at the time of ED visit. Carboplatin was not administered.     She reports doing well overall, no new symptoms. Started radiation therapy yesterday.      Interval History 10/24/2023   Ms. Hays presents for cycle 2 of carboplatin with concurrent radiation. Did well with first cycle. Denies any adverse effects including nausea, vomiting, diarrhea, neuropathy. Appetite is good. Radiation is going well. No complaints today.  Denies any skin rash or  bites.    Interval History 11/21/2023   Ms. Hays presents today for cycle 7 of carboplatin with concurrent radiation. She reports good tolerance thus far without neuropathy, nausea, vomiting, diarrhea. Appetite is stable.   Denies any GIB.    Interval History 01/11/2024   Ms. Hays presents after completion of therapy. She was admitted to the hospital 12/31 due to shortness of breath, found to have Afib with RVR and coronavirus HKU. She received inpatient antibiotics and steroids. Currently with improvement, still reports some fatigue.   We reviewed her last CT chest, which shows improvement after treatment. Discussed continuing immunotherapy.      Interval History 02/22/2024   Ms. Hays presents today for follow, prior to cycle 3 of maintenance durvalumab. Accompanied by her grand daughter's fiance. She reports improved energy. Denies any bleeding, nausea, vomiting, diarrhea, rash. On examination, noted to have petechia on bilateral distal lower extremities and R distal upper extremities, she believes these started today. Denies pruritus.     Interval History 03/21/2024    presents prior to C5 of consolidation therapy with durvalumab. She reports good tolerance to the last treatment, denies shortness of breath, diarrhea, nausea/vomiting, fatigue. She's been applying lotion to her legs, petechia has decreased, primarily just on her left leg now.     Interval History 04/18/2024   Ms. Hays presents prior to C7 of durvalumab. Reports good tolerance without nausea, vomiting, diarrhea. No changes in petechia. Denies any other complaints.    Interval History 06/13/2024   Ms. Hays presents for follow up prior to cycle 11 of durvalumab. She presented to ED 6/6 due to shortness of breath and cough. CXR showed RUL and left basilar airspace disease for which she received levofloxacin. Today with improvement in symptoms. Denies diarrhea, nausea, vomiting.     Interval History 07/18/2024   Ms. Hays presents for  follow-up, prior to cycle 13 of consolidation Durvalumab. She reports good tolerance, denies any cough/shortness of breath, NVD. Continues to have non pruritic petechia on her legs.     Interval History 08/15/2024   Ms. Hays presents for follow up, prior to C 15 of Durvalumab. Surveillance CT chest 8/1 shows airspace disease with volume loss right upper lobe similar to prior exam, felt to reflect post treatment related change.   Denies any new symptoms, continues to do well with tx.    Interval History 08/29/2024  Ms. Hays presents today for follow up, prior to cycle 16 Durvalumab. Overall, she reports that she has been doing well since her last visit. She continues to tolerate the treatments well and without any noticeable side effects. She reports a good appetite, hydrating well. Denies any nausea/vomiting or diarrhea. No rash. She is without any specific complaints at this time.     Interval History 09/12/2024   Ms. Hays presents for follow up, prior to C17 of consolidation therapy with durvalumab. She reports doing well without nausea, vomiting, diarrhea. No changes in breathing or cough. Continues to use intermittent oxygen. No new symptoms.     Interval History 11/21/2024   Ms. Hays presents for C22 of durvalumab. Surveillance CT chest shows volume loss right suprahilar consistent with evolving fibrosis, no pulmonary nodule or mass right lung to suggest recurrent disease, no hilar mediastinal adenopathy, centrilobular nodules throughout left mid lower lung zones consistent with atypical pneumonia or aspiration pneumonitis.     She does report recovering from a recent pneumonia, received antibiotics from her PCP. Denies fever/chills. No other adverse effects with last treatments.    Interval History 01/03/2025   Since our last visit, Ms. Hays had been admitted to the hospital for pneumonia. After discharge, was diagnosed with rhinovirus. This has delayed her treatments. Reports feeling much better today.  Had dysguesia during sickness, which has subsided and improved. Denies shortness of breath or cough. Notes intermittent swelling of the leg, which improves with raising of the leg.    Objective     Objective     Allergy:   Allergies   Allergen Reactions    Paclitaxel Anaphylaxis        Current Medications:   Current Outpatient Medications   Medication Sig Dispense Refill    albuterol sulfate  (90 Base) MCG/ACT inhaler Inhale 2 puffs by mouth Every 4 (Four) Hours As Needed for Wheezing. 18 g 6    apixaban (ELIQUIS) 5 MG tablet tablet Take 1 tablet by mouth Every 12 (Twelve) Hours. 180 tablet 3    atorvastatin (LIPITOR) 20 MG tablet Take 1 tablet by mouth Daily.      benzonatate (Tessalon Perles) 100 MG capsule Take 1 capsule by mouth 3 (Three) Times a Day As Needed for Cough. 30 capsule 0    Budeson-Glycopyrrol-Formoterol (Breztri Aerosphere) 160-9-4.8 MCG/ACT aerosol inhaler Inhale 2 puffs by mouth 2 (Two) Times a Day. 32.1 g 3    esomeprazole (nexIUM) 20 MG capsule Take 1 capsule by mouth Daily.      hydrOXYzine pamoate (VISTARIL) 25 MG capsule Take 1 capsule by mouth Every Night.      lisinopril (PRINIVIL,ZESTRIL) 10 MG tablet Take 1 tablet by mouth Daily. 30 tablet 2    metFORMIN (GLUCOPHAGE) 500 MG tablet Take 1 tablet by mouth 2 (Two) Times a Day As Needed (only takes if blood glucose is above 200). As needed       No current facility-administered medications for this visit.     Facility-Administered Medications Ordered in Other Visits   Medication Dose Route Frequency Provider Last Rate Last Admin    Durvalumab 1,000 mg in sodium chloride 0.9 % 270 mL chemo IVPB  1,000 mg Intravenous Once Shawna Bond  mL/hr at 01/03/25 1432 1,000 mg at 01/03/25 1432    heparin injection 500 Units  500 Units Intravenous PRN Bailee Gan APRN   500 Units at 01/26/24 1558    heparin injection 500 Units  500 Units Intravenous PRN Bailee Gan APRN   500 Units at 11/21/24 1654    heparin  injection 500 Units  500 Units Intravenous PRN Gilbert, Bailee Sharmaine, APRN        heparin injection 500 Units  500 Units Intravenous PRN Gilbert, Bailee Sharmaine, APRN        sodium chloride 0.9 % flush 10 mL  10 mL Intravenous PRN Gilbert, Bialee Sharmaine, APRN        sodium chloride 0.9 % flush 10 mL  10 mL Intravenous PRN Gilbert, Bailee Sharmaine, APRN   10 mL at 11/21/24 1654    sodium chloride 0.9 % flush 10 mL  10 mL Intravenous PRN Gilbert, Bailee Sharmaine, APRN        sodium chloride 0.9 % flush 10 mL  10 mL Intravenous PRN Gilbert, Bailee Sharmaine, APRN           Past Medical History:  Past Medical History:   Diagnosis Date    Arthritis     CHF (congestive heart failure)     Chronic anticoagulation     Eliquis    Chronic kidney disease     stage II/IIIa    Collapsed lung     COPD (chronic obstructive pulmonary disease)     Diabetes mellitus     TYPE 2    Elevated cholesterol     GERD (gastroesophageal reflux disease)     Hyperlipidemia     Hypertension     Non-small cell lung cancer RUL     Sleep apnea     non compliant with CPAP    Stroke 2019    Wears eyeglasses        Past Surgical History:  Past Surgical History:   Procedure Laterality Date    BRONCHOSCOPY Bilateral 02/27/2023    Procedure: BRONCHOSCOPY WITH ENDOBRONCHIAL ULTRASOUND;  Surgeon: Abdulkadir Peter MD;  Location: [facility] OR;  Service: Pulmonary;  Laterality: Bilateral;    BRONCHOSCOPY WITH ION ROBOTIC ASSIST N/A 9/6/2023    Procedure: BRONCHOSCOPY WITH ION ROBOT AND EBUS;  Surgeon: John Clemens MD;  Location: [facility] ENDOSCOPY;  Service: Robotics - Pulmonary;  Laterality: N/A;  Ion cath #6 - 0032,  #6 - 0030, cath guide # 0077. EBUS scope removed with balloon intact.    CARDIAC CATHETERIZATION N/A 08/22/2017    Procedure: Left Heart Cath;  Surgeon: Jatinder Matias MD;  Location: [facility] CATH INVASIVE LOCATION;  Service:     CARDIAC CATHETERIZATION N/A 11/22/2021    Procedure: Left Heart Cath;  Surgeon: Tolu Steinberg MD;  Location: [facility] CATH  INVASIVE LOCATION;  Service: Cardiology;  Laterality: N/A;    COLONOSCOPY      ENDOSCOPY      GALLBLADDER SURGERY      PORTACATH PLACEMENT N/A 9/29/2023    Procedure: INSERTION OF PORTACATH;  Surgeon: Petr Velásquez MD;  Location:  [facility] OR;  Service: General;  Laterality: N/A;    VENTRAL HERNIA REPAIR N/A 05/14/2020    Procedure: VENTRAL HERNIA REPAIR LAPAROSCOPIC WITH DAVINCI ROBOT;  Surgeon: Petr Velásquez MD;  Location:  [facility] OR;  Service: DaVinci;  Laterality: N/A;       Social History:  Social History     Socioeconomic History    Marital status: Single    Number of children: 6   Tobacco Use    Smoking status: Former     Current packs/day: 0.00     Average packs/day: 3.0 packs/day for 16.7 years (50.2 ttl pk-yrs)     Types: Cigarettes     Start date: 4/3/1998     Quit date: 1/1/2015     Years since quitting: 10.0     Passive exposure: Past    Smokeless tobacco: Never   Vaping Use    Vaping status: Never Used   Substance and Sexual Activity    Alcohol use: No    Drug use: No    Sexual activity: Never         Family History:  Family History   Problem Relation Age of Onset    Heart disease Mother         Rhianna sophia    Heart disease Father     Heart attack Father     Heart failure Father        Review of Systems:  Review of Systems   Constitutional:  Positive for fatigue.   All other systems reviewed and are negative.      Vital Signs:   There were no vitals taken for this visit.     Physical Exam:  Physical Exam  Vitals reviewed.   Constitutional:       General: She is not in acute distress.     Appearance: Normal appearance. She is obese. She is not ill-appearing.      Comments: +hard of hearing   HENT:      Head: Normocephalic and atraumatic.      Mouth/Throat:      Mouth: Mucous membranes are moist.      Pharynx: Oropharynx is clear.   Eyes:      Conjunctiva/sclera: Conjunctivae normal.      Pupils: Pupils are equal, round, and reactive to light.   Cardiovascular:      Rate and Rhythm: Normal  rate and regular rhythm.      Heart sounds: No murmur heard.  Pulmonary:      Effort: Pulmonary effort is normal. No respiratory distress.      Breath sounds: Wheezing present.   Chest:      Comments: +R chest port  Abdominal:      General: Abdomen is flat. Bowel sounds are normal. There is no distension.      Palpations: Abdomen is soft. There is no mass.      Tenderness: There is no abdominal tenderness. There is no guarding.   Musculoskeletal:         General: No swelling. Normal range of motion.      Cervical back: Normal range of motion.   Lymphadenopathy:      Cervical: No cervical adenopathy.   Skin:     General: Skin is warm and dry.      Comments: LLE    Neurological:      General: No focal deficit present.      Mental Status: She is alert and oriented to person, place, and time. Mental status is at baseline.   Psychiatric:         Mood and Affect: Mood normal.         PHQ-9 Score  PHQ-9 Total Score:       Pain Score  There were no vitals filed for this visit.                                    PAINSCOREQUALITYMETRIC:   There were no vitals filed for this visit.                           Lab Review  Lab Results   Component Value Date    WBC 7.30 01/03/2025    HGB 10.3 (L) 01/03/2025    HCT 34.0 01/03/2025    MCV 87.9 01/03/2025    RDW 14.8 01/03/2025     01/03/2025    NEUTRORELPCT 78.4 (H) 01/03/2025    LYMPHORELPCT 10.8 (L) 01/03/2025    MONORELPCT 6.4 01/03/2025    EOSRELPCT 3.2 01/03/2025    BASORELPCT 0.7 01/03/2025    NEUTROABS 5.72 01/03/2025    LYMPHSABS 0.79 01/03/2025     Lab Results   Component Value Date     01/03/2025    K 3.8 01/03/2025    CO2 27.6 01/03/2025     01/03/2025    BUN 13 01/03/2025    CREATININE 1.12 (H) 01/03/2025    EGFRIFNONA 51 (L) 11/22/2021    GLUCOSE 222 (H) 01/03/2025    CALCIUM 9.0 01/03/2025    ALKPHOS 108 01/03/2025    AST 19 01/03/2025    ALT 13 01/03/2025    BILITOT 0.3 01/03/2025    ALBUMIN 3.6 01/03/2025    PROTEINTOT 6.9 01/03/2025    MG 1.9  11/23/2024    PHOS 3.5 05/18/2020     Lab Results   Component Value Date    RETICCTPCT 4.26 (H) 08/16/2023     Lab Results   Component Value Date    FERRITIN 80.96 11/06/2024    IRON 91 11/06/2024    TIBC 390 11/06/2024    LABIRON 23 11/06/2024    RDQUCYFT06 548 08/14/2023    FOLATE 12.90 08/14/2023        Radiology Results    CT Chest With Contrast Diagnostic (11/11/2024 11:02)     IMPRESSION:  1.  Soft tissue density with volume loss and air bronchograms emanating  from the right suprahilar aspect has features on CT most consistent with  evolving fibrosis.  2.  No discrete pulmonary nodule or mass has developed in the right lung  to suggest recurrent disease.  3.  No hilar or mediastinal adenopathy based on CT size criteria.  4.  Centrilobular nodules throughout the left mid-lower lung zones noted  most consistent with atypical pneumonia or aspiration pneumonitis.  5.  Mild interstitial thickening favors mild edema in the setting of CHF  or volume overload.    CT Chest With Contrast Diagnostic (08/01/2024 14:14)     FINDINGS:  Soft tissue windows demonstrate no adenopathy within the chest. The  thyroid gland is unremarkable. The heart is normal in size. The aorta is  normal in caliber. There are no pleural or pericardial effusions. Lung  windows redemonstrate airspace disease in the right upper lobe with  volume loss felt to reflect posttreatment related change. Scarring is  present in the anterior left upper lobe and right middle lobe. There is  airspace disease in both lung bases similar in appearance to the prior  exam.. The visualized upper abdomen is unremarkable. Bone windows reveal  no lytic or destructive lesions.     IMPRESSION:  Airspace disease with volume loss in the right upper lobe  similar in appearance to the prior exam felt to reflect posttreatment  related change. Continued follow-up is recommended.      CT Chest With Contrast Diagnostic (05/01/2024 09:01)     IMPRESSION:  1. Development of  airspace disease in the right upper lobe which is in  the region of a previously noted spiculated nodule likely reflecting  posttreatment related change.  2. Interval improvement in the previously noted bilateral lower lobe  airspace disease with a few patchy opacities remaining.    CT Chest Without Contrast Diagnostic (12/31/2023 20:30)   IMPRESSION:  Spiculated nodule of the right upper lobe concerning for neoplasm.  Masslike consolidation in the right lower lobe could represent pneumonia  with underlying mass not excluded. Atelectasis/airspace disease in the  left lower lobe and lingula. Mediastinal adenopathy. Follow-up  recommended.    CT Chest With Contrast Diagnostic (12/19/2023 14:47)     IMPRESSION:  1.  Significant decrease size and masslike appearance of process in the  right upper lobe which would suggest significant response to treatment.  2.  No suspicious pulmonary nodules or masses identified.  3.  Mediastinal lymph nodes are now within normal limits for size.  No  hilar or mediastinal adenopathy.  4.  Mild centrilobular nodularity of the left lower lobe which may  reflect changes of atypical pneumonia or aspiration pneumonitis.  5.  Stable cardiomegaly.  6.  Other incidental/nonacute findings above.      CT Angiogram Chest Pulmonary Embolism (10/09/2023 16:41)   MPRESSION:  1.  Right upper lobe mass in association with pneumonia has increased in  size and extent when compared to the previous exam with extension to the  pleural surface. Masslike component is approximately 56.8 x 44.5 mm and  was previously 40.1 x 36.7 mm. This would correspond to primary  malignancy.  2.  Central bronchial wall thickening. Can be seen with reactive airway  disease or bronchitis.  3.  1.3 cm left adrenal nodule is noted. No significant change from  previous.  4.  Increased size of paratracheal and mediastinal lymph nodes. A right  paratracheal lymph node is 2.5 cm and was previously 2.2 cm.  5. No PE identified.  6.  Marked cardiomegaly, stable.  7. Otherwise stable chest with other nonacute/incidental findings as  above.    CT Head Without Contrast (10/09/2023 12:00)   IMPRESSION:    Unremarkable exam demonstrating no CT evidence of acute intracranial  findings.      CT Chest Without Contrast Diagnostic (08/09/2023 12:38)       NM PET/CT Skull Base to Mid Thigh (06/20/2023 11:12)         CT Chest Low Dose Cancer Screening WO (02/14/2023 12:58)   IMPRESSION:    Interval development or enlargement of a 1.9 cm spiculated right upper  lobe pulmonary nodule concerning in appearance for malignancy and PET/CT  is recommended for further evaluation.        Pathology:     Tissue Pathology Exam (09/06/2023 08:13)         6/29/23           Assessment / Plan         Assessment and Plan   Mayra Hays is a 79 y.o. year old presents for       Non small cell lung cancer, squamous cell    Cancer Staging   Stage IIIA (cT1c, cN2, cM0)  -Oncology history as above. Patient with 1.9 cm spiculated right upper lobe nodule on low dose CT chest screen (2/2023). EBUS by Dr. Peter negative for malignancy (2/2023). PET/CT with hypermetabolism RUL 2.1 cm and pretracheal region lymph node (6/2023). CT guided biopsy negative for malignancy (6/2023). Navigational bronchoscopy with positive RUL and  4R (paratracheal) for squamous cell (9/6/2023). PD-L1 TPS score 60%.   -Completed weekly carboplatin AUC2. Patient experienced anaphylaxis 8 minutes into the paclitaxel on first cycle 10/9. Due to this, paclitaxel will be omitted from weekly chemotherapy.  -Radiation therapy 10/10; 6000 cGy in 30 treatment fractions. Complete 11/21/2023  -Post treatment CT 12/19/23 with good/partial response  -Cont consolidation therapy with durvalumab every 2 weeks; C23/26 today. Repeat CT chest 2/2025    2. Anemia; iron deficiency anemia due to GIB  - Patient with normal H/H in our system 2/2023, with acute drop in Hgb 6/29 (10.7) to Hgb (6.5) on 8/9 requiring transfusion.  Patient reports one episode of melena two days ago (she is a poor historian).   -Last colonoscopy and endoscopy are unknown; think it may have been >5 years ago  -CBC from 8/14 show Hgb 8.2, Hct 27, MCV 90. Normal WBC and platelet count. Iron studies with normal iron (37), TIBC (435), and low iron saturation (9). This is in light of recent blood transfusion. Normal folate and B12 level. Reticulocyte count noted to be elevated to 6%  -Patient is with fatigue. Denies shortness of breath (aside from baseline COPD) or chest pain. Continues to be on Eliquis for Afib  -Initial work up from consultation confirmed iron deficiency anemia, normal folate/b12 levels. No indication of hemolysis seen with normal LDH and haptoglobin. Myeloma work up has been negative with no M spike on serum protein electrophoresis and normal k/l free light chain ratio. Peripheral smear with normal total WBC without granulocytic dysplasia or blasts.  -Received Ferumoxytol 8/29/2023 and 11/27/23  -Pt to see surgery post treatment for repeat EGD and Colonoscopy     3. Malignancy associated pain  - Currently denies    4. Surveillance (per NCCN)  -Surveillance would include CT with and without contrast every 3 to 6 months for 3 years, then every 6 months for 2 years, then low-dose noncontrast CT annually.   Next CT 2/2025    5. Petechia of lower extremities -resolved   - Normal Plt counts    Discussed possible differential diagnoses, testing, treatment, recommended non-pharmacological interventions, risks, warning signs to monitor for that would indicate need for follow-up in clinic or ER. If no improvement with these regimens or you have new or worsening symptoms follow-up. Patient verbalizes understanding and agreement with plan of care. Denies further needs or concerns.     Patient was given instructions and counseling regarding her condition and for health maintenance advised.       All questions were answered to her satisfaction.               Meds ordered during this visit  No orders of the defined types were placed in this encounter.      Visit Diagnoses    ICD-10-CM ICD-9-CM   1. Non-small cell cancer of right lung  C34.91 162.9   2. Chronic obstructive pulmonary disease, unspecified COPD type  J44.9 496   3. Iron deficiency anemia, unspecified iron deficiency anemia type  D50.9 280.9                                 Follow Up   In 6 weeks: CBC, iron studies, ferritin, CMP, folate, B12        This document has been electronically signed by Shawna Bond MD   January 3, 2025 14:36 EST    Dictated Utilizing Dragon Dictation: Part of this note may be an electronic transcription/translation of spoken language to printed text using the Dragon Dictation System.

## 2025-01-16 ENCOUNTER — INFUSION (OUTPATIENT)
Dept: ONCOLOGY | Facility: HOSPITAL | Age: 80
End: 2025-01-16
Payer: MEDICARE

## 2025-01-16 ENCOUNTER — LAB (OUTPATIENT)
Dept: ONCOLOGY | Facility: HOSPITAL | Age: 80
End: 2025-01-16
Payer: MEDICARE

## 2025-01-16 VITALS
SYSTOLIC BLOOD PRESSURE: 147 MMHG | WEIGHT: 218 LBS | OXYGEN SATURATION: 93 % | RESPIRATION RATE: 18 BRPM | DIASTOLIC BLOOD PRESSURE: 64 MMHG | HEART RATE: 73 BPM | TEMPERATURE: 97.5 F | BODY MASS INDEX: 34.14 KG/M2

## 2025-01-16 DIAGNOSIS — C34.91 NON-SMALL CELL CANCER OF RIGHT LUNG: Primary | ICD-10-CM

## 2025-01-16 DIAGNOSIS — C34.11 PRIMARY CANCER OF RIGHT UPPER LOBE OF LUNG: ICD-10-CM

## 2025-01-16 DIAGNOSIS — D50.9 IRON DEFICIENCY ANEMIA, UNSPECIFIED IRON DEFICIENCY ANEMIA TYPE: ICD-10-CM

## 2025-01-16 DIAGNOSIS — R53.83 OTHER FATIGUE: ICD-10-CM

## 2025-01-16 DIAGNOSIS — Z95.828 PORT-A-CATH IN PLACE: ICD-10-CM

## 2025-01-16 DIAGNOSIS — C34.91 NON-SMALL CELL CANCER OF RIGHT LUNG: ICD-10-CM

## 2025-01-16 LAB
ALBUMIN SERPL-MCNC: 4.1 G/DL (ref 3.5–5.2)
ALBUMIN/GLOB SERPL: 1.2 G/DL
ALP SERPL-CCNC: 103 U/L (ref 39–117)
ALT SERPL W P-5'-P-CCNC: 11 U/L (ref 1–33)
ANION GAP SERPL CALCULATED.3IONS-SCNC: 9.7 MMOL/L (ref 5–15)
AST SERPL-CCNC: 18 U/L (ref 1–32)
BASOPHILS # BLD AUTO: 0.07 10*3/MM3 (ref 0–0.2)
BASOPHILS NFR BLD AUTO: 1.1 % (ref 0–1.5)
BILIRUB SERPL-MCNC: 0.3 MG/DL (ref 0–1.2)
BUN SERPL-MCNC: 17 MG/DL (ref 8–23)
BUN/CREAT SERPL: 14.5 (ref 7–25)
CALCIUM SPEC-SCNC: 9.3 MG/DL (ref 8.6–10.5)
CHLORIDE SERPL-SCNC: 98 MMOL/L (ref 98–107)
CO2 SERPL-SCNC: 28.3 MMOL/L (ref 22–29)
CREAT SERPL-MCNC: 1.17 MG/DL (ref 0.57–1)
DEPRECATED RDW RBC AUTO: 46.3 FL (ref 37–54)
EGFRCR SERPLBLD CKD-EPI 2021: 47.9 ML/MIN/1.73
EOSINOPHIL # BLD AUTO: 0.3 10*3/MM3 (ref 0–0.4)
EOSINOPHIL NFR BLD AUTO: 4.7 % (ref 0.3–6.2)
ERYTHROCYTE [DISTWIDTH] IN BLOOD BY AUTOMATED COUNT: 14.4 % (ref 12.3–15.4)
FERRITIN SERPL-MCNC: 88.49 NG/ML (ref 13–150)
GLOBULIN UR ELPH-MCNC: 3.3 GM/DL
GLUCOSE SERPL-MCNC: 216 MG/DL (ref 65–99)
HCT VFR BLD AUTO: 35.8 % (ref 34–46.6)
HGB BLD-MCNC: 10.9 G/DL (ref 12–15.9)
IMM GRANULOCYTES # BLD AUTO: 0.03 10*3/MM3 (ref 0–0.05)
IMM GRANULOCYTES NFR BLD AUTO: 0.5 % (ref 0–0.5)
IRON 24H UR-MRATE: 59 MCG/DL (ref 37–145)
IRON SATN MFR SERPL: 15 % (ref 20–50)
LYMPHOCYTES # BLD AUTO: 0.81 10*3/MM3 (ref 0.7–3.1)
LYMPHOCYTES NFR BLD AUTO: 12.6 % (ref 19.6–45.3)
MCH RBC QN AUTO: 26.7 PG (ref 26.6–33)
MCHC RBC AUTO-ENTMCNC: 30.4 G/DL (ref 31.5–35.7)
MCV RBC AUTO: 87.7 FL (ref 79–97)
MONOCYTES # BLD AUTO: 0.47 10*3/MM3 (ref 0.1–0.9)
MONOCYTES NFR BLD AUTO: 7.3 % (ref 5–12)
NEUTROPHILS NFR BLD AUTO: 4.76 10*3/MM3 (ref 1.7–7)
NEUTROPHILS NFR BLD AUTO: 73.8 % (ref 42.7–76)
NRBC BLD AUTO-RTO: 0 /100 WBC (ref 0–0.2)
PLATELET # BLD AUTO: 241 10*3/MM3 (ref 140–450)
PMV BLD AUTO: 10.7 FL (ref 6–12)
POTASSIUM SERPL-SCNC: 3.5 MMOL/L (ref 3.5–5.2)
PROT SERPL-MCNC: 7.4 G/DL (ref 6–8.5)
RBC # BLD AUTO: 4.08 10*6/MM3 (ref 3.77–5.28)
SODIUM SERPL-SCNC: 136 MMOL/L (ref 136–145)
TIBC SERPL-MCNC: 401 MCG/DL (ref 298–536)
TRANSFERRIN SERPL-MCNC: 269 MG/DL (ref 200–360)
WBC NRBC COR # BLD AUTO: 6.44 10*3/MM3 (ref 3.4–10.8)

## 2025-01-16 PROCEDURE — 85025 COMPLETE CBC W/AUTO DIFF WBC: CPT

## 2025-01-16 PROCEDURE — 80053 COMPREHEN METABOLIC PANEL: CPT

## 2025-01-16 PROCEDURE — 83540 ASSAY OF IRON: CPT

## 2025-01-16 PROCEDURE — 82728 ASSAY OF FERRITIN: CPT

## 2025-01-16 PROCEDURE — 25810000003 SODIUM CHLORIDE 0.9 % SOLUTION 250 ML FLEX CONT: Performed by: INTERNAL MEDICINE

## 2025-01-16 PROCEDURE — 84466 ASSAY OF TRANSFERRIN: CPT

## 2025-01-16 PROCEDURE — 25010000002 DURVALUMAB 500 MG/10ML SOLUTION 10 ML VIAL: Performed by: INTERNAL MEDICINE

## 2025-01-16 PROCEDURE — 96413 CHEMO IV INFUSION 1 HR: CPT

## 2025-01-16 PROCEDURE — 25010000002 HEPARIN LOCK FLUSH PER 10 UNITS: Performed by: NURSE PRACTITIONER

## 2025-01-16 RX ORDER — SODIUM CHLORIDE 0.9 % (FLUSH) 0.9 %
10 SYRINGE (ML) INJECTION AS NEEDED
OUTPATIENT
Start: 2025-01-16

## 2025-01-16 RX ORDER — SODIUM CHLORIDE 0.9 % (FLUSH) 0.9 %
20 SYRINGE (ML) INJECTION AS NEEDED
OUTPATIENT
Start: 2025-01-16

## 2025-01-16 RX ORDER — HEPARIN SODIUM (PORCINE) LOCK FLUSH IV SOLN 100 UNIT/ML 100 UNIT/ML
300 SOLUTION INTRAVENOUS ONCE
OUTPATIENT
Start: 2025-01-16

## 2025-01-16 RX ORDER — SODIUM CHLORIDE 9 MG/ML
250 INJECTION, SOLUTION INTRAVENOUS ONCE
Status: DISCONTINUED | OUTPATIENT
Start: 2025-01-16 | End: 2025-01-16

## 2025-01-16 RX ORDER — HEPARIN SODIUM (PORCINE) LOCK FLUSH IV SOLN 100 UNIT/ML 100 UNIT/ML
500 SOLUTION INTRAVENOUS AS NEEDED
OUTPATIENT
Start: 2025-01-16

## 2025-01-16 RX ORDER — HEPARIN SODIUM (PORCINE) LOCK FLUSH IV SOLN 100 UNIT/ML 100 UNIT/ML
500 SOLUTION INTRAVENOUS AS NEEDED
Status: DISCONTINUED | OUTPATIENT
Start: 2025-01-16 | End: 2025-01-16 | Stop reason: HOSPADM

## 2025-01-16 RX ORDER — SODIUM CHLORIDE 0.9 % (FLUSH) 0.9 %
10 SYRINGE (ML) INJECTION AS NEEDED
Status: DISCONTINUED | OUTPATIENT
Start: 2025-01-16 | End: 2025-01-16 | Stop reason: HOSPADM

## 2025-01-16 RX ADMIN — SODIUM CHLORIDE 1000 MG: 9 INJECTION, SOLUTION INTRAVENOUS at 14:54

## 2025-01-16 RX ADMIN — Medication 10 ML: at 16:03

## 2025-01-16 RX ADMIN — HEPARIN 500 UNITS: 100 SYRINGE at 16:03

## 2025-01-23 DIAGNOSIS — C34.11 PRIMARY CANCER OF RIGHT UPPER LOBE OF LUNG: ICD-10-CM

## 2025-01-23 DIAGNOSIS — C34.91 NON-SMALL CELL CANCER OF RIGHT LUNG: Primary | ICD-10-CM

## 2025-01-23 RX ORDER — SODIUM CHLORIDE 9 MG/ML
250 INJECTION, SOLUTION INTRAVENOUS ONCE
OUTPATIENT
Start: 2025-01-30

## 2025-01-23 RX ORDER — SODIUM CHLORIDE 9 MG/ML
250 INJECTION, SOLUTION INTRAVENOUS ONCE
OUTPATIENT
Start: 2025-02-13

## 2025-01-30 ENCOUNTER — INFUSION (OUTPATIENT)
Dept: ONCOLOGY | Facility: HOSPITAL | Age: 80
End: 2025-01-30
Payer: MEDICARE

## 2025-01-30 ENCOUNTER — LAB (OUTPATIENT)
Dept: ONCOLOGY | Facility: HOSPITAL | Age: 80
End: 2025-01-30
Payer: MEDICARE

## 2025-01-30 VITALS
SYSTOLIC BLOOD PRESSURE: 157 MMHG | RESPIRATION RATE: 16 BRPM | HEART RATE: 78 BPM | OXYGEN SATURATION: 93 % | TEMPERATURE: 97.3 F | DIASTOLIC BLOOD PRESSURE: 65 MMHG

## 2025-01-30 DIAGNOSIS — Z95.828 PORT-A-CATH IN PLACE: ICD-10-CM

## 2025-01-30 DIAGNOSIS — C34.11 PRIMARY CANCER OF RIGHT UPPER LOBE OF LUNG: ICD-10-CM

## 2025-01-30 DIAGNOSIS — C34.91 NON-SMALL CELL CANCER OF RIGHT LUNG: Primary | ICD-10-CM

## 2025-01-30 DIAGNOSIS — C34.91 NON-SMALL CELL CANCER OF RIGHT LUNG: ICD-10-CM

## 2025-01-30 LAB
ALBUMIN SERPL-MCNC: 4 G/DL (ref 3.5–5.2)
ALBUMIN/GLOB SERPL: 1.1 G/DL
ALP SERPL-CCNC: 102 U/L (ref 39–117)
ALT SERPL W P-5'-P-CCNC: 8 U/L (ref 1–33)
ANION GAP SERPL CALCULATED.3IONS-SCNC: 11.2 MMOL/L (ref 5–15)
AST SERPL-CCNC: 14 U/L (ref 1–32)
BASOPHILS # BLD AUTO: 0.09 10*3/MM3 (ref 0–0.2)
BASOPHILS NFR BLD AUTO: 1.4 % (ref 0–1.5)
BILIRUB SERPL-MCNC: 0.3 MG/DL (ref 0–1.2)
BUN SERPL-MCNC: 18 MG/DL (ref 8–23)
BUN/CREAT SERPL: 15.9 (ref 7–25)
CALCIUM SPEC-SCNC: 9.1 MG/DL (ref 8.6–10.5)
CHLORIDE SERPL-SCNC: 103 MMOL/L (ref 98–107)
CO2 SERPL-SCNC: 26.8 MMOL/L (ref 22–29)
CREAT SERPL-MCNC: 1.13 MG/DL (ref 0.57–1)
DEPRECATED RDW RBC AUTO: 44.3 FL (ref 37–54)
EGFRCR SERPLBLD CKD-EPI 2021: 49.9 ML/MIN/1.73
EOSINOPHIL # BLD AUTO: 0.26 10*3/MM3 (ref 0–0.4)
EOSINOPHIL NFR BLD AUTO: 4.1 % (ref 0.3–6.2)
ERYTHROCYTE [DISTWIDTH] IN BLOOD BY AUTOMATED COUNT: 13.9 % (ref 12.3–15.4)
GLOBULIN UR ELPH-MCNC: 3.6 GM/DL
GLUCOSE SERPL-MCNC: 256 MG/DL (ref 65–99)
HCT VFR BLD AUTO: 38.6 % (ref 34–46.6)
HGB BLD-MCNC: 11.8 G/DL (ref 12–15.9)
IMM GRANULOCYTES # BLD AUTO: 0.03 10*3/MM3 (ref 0–0.05)
IMM GRANULOCYTES NFR BLD AUTO: 0.5 % (ref 0–0.5)
LYMPHOCYTES # BLD AUTO: 1.07 10*3/MM3 (ref 0.7–3.1)
LYMPHOCYTES NFR BLD AUTO: 17 % (ref 19.6–45.3)
MCH RBC QN AUTO: 26.8 PG (ref 26.6–33)
MCHC RBC AUTO-ENTMCNC: 30.6 G/DL (ref 31.5–35.7)
MCV RBC AUTO: 87.7 FL (ref 79–97)
MONOCYTES # BLD AUTO: 0.48 10*3/MM3 (ref 0.1–0.9)
MONOCYTES NFR BLD AUTO: 7.6 % (ref 5–12)
NEUTROPHILS NFR BLD AUTO: 4.36 10*3/MM3 (ref 1.7–7)
NEUTROPHILS NFR BLD AUTO: 69.4 % (ref 42.7–76)
NRBC BLD AUTO-RTO: 0 /100 WBC (ref 0–0.2)
PLATELET # BLD AUTO: 218 10*3/MM3 (ref 140–450)
PMV BLD AUTO: 10.7 FL (ref 6–12)
POTASSIUM SERPL-SCNC: 3.9 MMOL/L (ref 3.5–5.2)
PROT SERPL-MCNC: 7.6 G/DL (ref 6–8.5)
RBC # BLD AUTO: 4.4 10*6/MM3 (ref 3.77–5.28)
SODIUM SERPL-SCNC: 141 MMOL/L (ref 136–145)
T4 FREE SERPL-MCNC: 0.88 NG/DL (ref 0.92–1.68)
TSH SERPL DL<=0.05 MIU/L-ACNC: 2.66 UIU/ML (ref 0.27–4.2)
WBC NRBC COR # BLD AUTO: 6.29 10*3/MM3 (ref 3.4–10.8)

## 2025-01-30 PROCEDURE — 80053 COMPREHEN METABOLIC PANEL: CPT

## 2025-01-30 PROCEDURE — 84443 ASSAY THYROID STIM HORMONE: CPT

## 2025-01-30 PROCEDURE — 25810000003 SODIUM CHLORIDE 0.9 % SOLUTION 250 ML FLEX CONT: Performed by: INTERNAL MEDICINE

## 2025-01-30 PROCEDURE — 85025 COMPLETE CBC W/AUTO DIFF WBC: CPT

## 2025-01-30 PROCEDURE — 84439 ASSAY OF FREE THYROXINE: CPT

## 2025-01-30 PROCEDURE — 25010000002 DURVALUMAB 500 MG/10ML SOLUTION 10 ML VIAL: Performed by: INTERNAL MEDICINE

## 2025-01-30 PROCEDURE — 25010000002 HEPARIN LOCK FLUSH PER 10 UNITS: Performed by: NURSE PRACTITIONER

## 2025-01-30 PROCEDURE — 96413 CHEMO IV INFUSION 1 HR: CPT

## 2025-01-30 RX ORDER — HEPARIN SODIUM (PORCINE) LOCK FLUSH IV SOLN 100 UNIT/ML 100 UNIT/ML
300 SOLUTION INTRAVENOUS ONCE
OUTPATIENT
Start: 2025-01-30

## 2025-01-30 RX ORDER — HEPARIN SODIUM (PORCINE) LOCK FLUSH IV SOLN 100 UNIT/ML 100 UNIT/ML
500 SOLUTION INTRAVENOUS AS NEEDED
Status: DISCONTINUED | OUTPATIENT
Start: 2025-01-30 | End: 2025-01-30 | Stop reason: HOSPADM

## 2025-01-30 RX ORDER — SODIUM CHLORIDE 0.9 % (FLUSH) 0.9 %
10 SYRINGE (ML) INJECTION AS NEEDED
Status: DISCONTINUED | OUTPATIENT
Start: 2025-01-30 | End: 2025-01-30 | Stop reason: HOSPADM

## 2025-01-30 RX ORDER — LIDOCAINE/PRILOCAINE 2.5 %-2.5%
1 CREAM (GRAM) TOPICAL AS NEEDED
Qty: 5 G | Refills: 3 | Status: SHIPPED | OUTPATIENT
Start: 2025-01-30

## 2025-01-30 RX ORDER — SODIUM CHLORIDE 0.9 % (FLUSH) 0.9 %
20 SYRINGE (ML) INJECTION AS NEEDED
OUTPATIENT
Start: 2025-01-30

## 2025-01-30 RX ORDER — SODIUM CHLORIDE 9 MG/ML
250 INJECTION, SOLUTION INTRAVENOUS ONCE
Status: DISCONTINUED | OUTPATIENT
Start: 2025-01-30 | End: 2025-01-30 | Stop reason: HOSPADM

## 2025-01-30 RX ORDER — SODIUM CHLORIDE 0.9 % (FLUSH) 0.9 %
10 SYRINGE (ML) INJECTION AS NEEDED
OUTPATIENT
Start: 2025-01-30

## 2025-01-30 RX ORDER — HEPARIN SODIUM (PORCINE) LOCK FLUSH IV SOLN 100 UNIT/ML 100 UNIT/ML
500 SOLUTION INTRAVENOUS AS NEEDED
OUTPATIENT
Start: 2025-01-30

## 2025-01-30 RX ADMIN — Medication 10 ML: at 15:58

## 2025-01-30 RX ADMIN — SODIUM CHLORIDE 1000 MG: 9 INJECTION, SOLUTION INTRAVENOUS at 14:54

## 2025-01-30 RX ADMIN — HEPARIN 500 UNITS: 100 SYRINGE at 15:58

## 2025-02-10 ENCOUNTER — DOCUMENTATION (OUTPATIENT)
Dept: ONCOLOGY | Facility: CLINIC | Age: 80
End: 2025-02-10
Payer: MEDICARE

## 2025-02-10 NOTE — PROGRESS NOTES
Please refer to the Social Work flowsheet for assessment details.    SS will continue to follow and assist the patient as needed.

## 2025-02-13 ENCOUNTER — LAB (OUTPATIENT)
Dept: ONCOLOGY | Facility: HOSPITAL | Age: 80
End: 2025-02-13
Payer: MEDICARE

## 2025-02-13 ENCOUNTER — OFFICE VISIT (OUTPATIENT)
Dept: ONCOLOGY | Facility: CLINIC | Age: 80
End: 2025-02-13
Payer: MEDICARE

## 2025-02-13 ENCOUNTER — INFUSION (OUTPATIENT)
Dept: ONCOLOGY | Facility: HOSPITAL | Age: 80
End: 2025-02-13
Payer: MEDICARE

## 2025-02-13 VITALS
TEMPERATURE: 97.6 F | RESPIRATION RATE: 18 BRPM | DIASTOLIC BLOOD PRESSURE: 60 MMHG | SYSTOLIC BLOOD PRESSURE: 163 MMHG | HEART RATE: 61 BPM | OXYGEN SATURATION: 96 %

## 2025-02-13 DIAGNOSIS — K90.49 MALABSORPTION DUE TO INTOLERANCE, NOT ELSEWHERE CLASSIFIED: ICD-10-CM

## 2025-02-13 DIAGNOSIS — R53.83 OTHER FATIGUE: ICD-10-CM

## 2025-02-13 DIAGNOSIS — D50.9 IRON DEFICIENCY ANEMIA, UNSPECIFIED IRON DEFICIENCY ANEMIA TYPE: ICD-10-CM

## 2025-02-13 DIAGNOSIS — C34.11 PRIMARY CANCER OF RIGHT UPPER LOBE OF LUNG: Primary | ICD-10-CM

## 2025-02-13 DIAGNOSIS — R79.89 ELEVATED SERUM CREATININE: ICD-10-CM

## 2025-02-13 DIAGNOSIS — C34.11 PRIMARY CANCER OF RIGHT UPPER LOBE OF LUNG: ICD-10-CM

## 2025-02-13 DIAGNOSIS — C34.91 NON-SMALL CELL CANCER OF RIGHT LUNG: ICD-10-CM

## 2025-02-13 DIAGNOSIS — C34.91 NON-SMALL CELL CANCER OF RIGHT LUNG: Primary | ICD-10-CM

## 2025-02-13 DIAGNOSIS — Z95.828 PORT-A-CATH IN PLACE: Primary | ICD-10-CM

## 2025-02-13 LAB
ALBUMIN SERPL-MCNC: 4.1 G/DL (ref 3.5–5.2)
ALBUMIN/GLOB SERPL: 1.1 G/DL
ALP SERPL-CCNC: 108 U/L (ref 39–117)
ALT SERPL W P-5'-P-CCNC: 12 U/L (ref 1–33)
ANION GAP SERPL CALCULATED.3IONS-SCNC: 9.7 MMOL/L (ref 5–15)
AST SERPL-CCNC: 14 U/L (ref 1–32)
BASOPHILS # BLD AUTO: 0.07 10*3/MM3 (ref 0–0.2)
BASOPHILS NFR BLD AUTO: 1.2 % (ref 0–1.5)
BILIRUB SERPL-MCNC: 0.2 MG/DL (ref 0–1.2)
BUN SERPL-MCNC: 19 MG/DL (ref 8–23)
BUN/CREAT SERPL: 18.6 (ref 7–25)
CALCIUM SPEC-SCNC: 9.3 MG/DL (ref 8.6–10.5)
CHLORIDE SERPL-SCNC: 104 MMOL/L (ref 98–107)
CO2 SERPL-SCNC: 27.3 MMOL/L (ref 22–29)
CREAT SERPL-MCNC: 1.02 MG/DL (ref 0.57–1)
DEPRECATED RDW RBC AUTO: 42.9 FL (ref 37–54)
EGFRCR SERPLBLD CKD-EPI 2021: 56.4 ML/MIN/1.73
EOSINOPHIL # BLD AUTO: 0.36 10*3/MM3 (ref 0–0.4)
EOSINOPHIL NFR BLD AUTO: 6.1 % (ref 0.3–6.2)
ERYTHROCYTE [DISTWIDTH] IN BLOOD BY AUTOMATED COUNT: 13.6 % (ref 12.3–15.4)
FERRITIN SERPL-MCNC: 65.31 NG/ML (ref 13–150)
GLOBULIN UR ELPH-MCNC: 3.6 GM/DL
GLUCOSE SERPL-MCNC: 176 MG/DL (ref 65–99)
HCT VFR BLD AUTO: 39.4 % (ref 34–46.6)
HGB BLD-MCNC: 12.2 G/DL (ref 12–15.9)
IMM GRANULOCYTES # BLD AUTO: 0.02 10*3/MM3 (ref 0–0.05)
IMM GRANULOCYTES NFR BLD AUTO: 0.3 % (ref 0–0.5)
IRON 24H UR-MRATE: 75 MCG/DL (ref 37–145)
IRON SATN MFR SERPL: 18 % (ref 20–50)
LYMPHOCYTES # BLD AUTO: 1.17 10*3/MM3 (ref 0.7–3.1)
LYMPHOCYTES NFR BLD AUTO: 19.7 % (ref 19.6–45.3)
MCH RBC QN AUTO: 26.8 PG (ref 26.6–33)
MCHC RBC AUTO-ENTMCNC: 31 G/DL (ref 31.5–35.7)
MCV RBC AUTO: 86.6 FL (ref 79–97)
MONOCYTES # BLD AUTO: 0.49 10*3/MM3 (ref 0.1–0.9)
MONOCYTES NFR BLD AUTO: 8.2 % (ref 5–12)
NEUTROPHILS NFR BLD AUTO: 3.83 10*3/MM3 (ref 1.7–7)
NEUTROPHILS NFR BLD AUTO: 64.5 % (ref 42.7–76)
NRBC BLD AUTO-RTO: 0 /100 WBC (ref 0–0.2)
PLATELET # BLD AUTO: 203 10*3/MM3 (ref 140–450)
PMV BLD AUTO: 10.9 FL (ref 6–12)
POTASSIUM SERPL-SCNC: 4 MMOL/L (ref 3.5–5.2)
PROT SERPL-MCNC: 7.7 G/DL (ref 6–8.5)
RBC # BLD AUTO: 4.55 10*6/MM3 (ref 3.77–5.28)
SODIUM SERPL-SCNC: 141 MMOL/L (ref 136–145)
TIBC SERPL-MCNC: 410 MCG/DL (ref 298–536)
TRANSFERRIN SERPL-MCNC: 275 MG/DL (ref 200–360)
WBC NRBC COR # BLD AUTO: 5.94 10*3/MM3 (ref 3.4–10.8)

## 2025-02-13 PROCEDURE — 25010000002 DURVALUMAB 500 MG/10ML SOLUTION 10 ML VIAL: Performed by: INTERNAL MEDICINE

## 2025-02-13 PROCEDURE — 25010000002 HEPARIN LOCK FLUSH PER 10 UNITS: Performed by: NURSE PRACTITIONER

## 2025-02-13 PROCEDURE — 96413 CHEMO IV INFUSION 1 HR: CPT

## 2025-02-13 PROCEDURE — 82746 ASSAY OF FOLIC ACID SERUM: CPT

## 2025-02-13 PROCEDURE — 82607 VITAMIN B-12: CPT

## 2025-02-13 PROCEDURE — 96409 CHEMO IV PUSH SNGL DRUG: CPT

## 2025-02-13 PROCEDURE — 25810000003 SODIUM CHLORIDE 0.9 % SOLUTION 250 ML FLEX CONT: Performed by: INTERNAL MEDICINE

## 2025-02-13 PROCEDURE — 85025 COMPLETE CBC W/AUTO DIFF WBC: CPT

## 2025-02-13 PROCEDURE — 84466 ASSAY OF TRANSFERRIN: CPT

## 2025-02-13 PROCEDURE — 80053 COMPREHEN METABOLIC PANEL: CPT

## 2025-02-13 PROCEDURE — 82728 ASSAY OF FERRITIN: CPT

## 2025-02-13 PROCEDURE — 83540 ASSAY OF IRON: CPT

## 2025-02-13 RX ORDER — HEPARIN SODIUM (PORCINE) LOCK FLUSH IV SOLN 100 UNIT/ML 100 UNIT/ML
300 SOLUTION INTRAVENOUS ONCE
OUTPATIENT
Start: 2025-02-13

## 2025-02-13 RX ORDER — SODIUM CHLORIDE 0.9 % (FLUSH) 0.9 %
10 SYRINGE (ML) INJECTION AS NEEDED
OUTPATIENT
Start: 2025-02-13

## 2025-02-13 RX ORDER — SODIUM CHLORIDE 0.9 % (FLUSH) 0.9 %
10 SYRINGE (ML) INJECTION AS NEEDED
Status: DISCONTINUED | OUTPATIENT
Start: 2025-02-13 | End: 2025-02-13 | Stop reason: HOSPADM

## 2025-02-13 RX ORDER — HEPARIN SODIUM (PORCINE) LOCK FLUSH IV SOLN 100 UNIT/ML 100 UNIT/ML
500 SOLUTION INTRAVENOUS AS NEEDED
Status: DISCONTINUED | OUTPATIENT
Start: 2025-02-13 | End: 2025-02-13 | Stop reason: HOSPADM

## 2025-02-13 RX ORDER — SODIUM CHLORIDE 0.9 % (FLUSH) 0.9 %
20 SYRINGE (ML) INJECTION AS NEEDED
OUTPATIENT
Start: 2025-02-13

## 2025-02-13 RX ORDER — HEPARIN SODIUM (PORCINE) LOCK FLUSH IV SOLN 100 UNIT/ML 100 UNIT/ML
500 SOLUTION INTRAVENOUS AS NEEDED
OUTPATIENT
Start: 2025-02-13

## 2025-02-13 RX ADMIN — SODIUM CHLORIDE 1000 MG: 9 INJECTION, SOLUTION INTRAVENOUS at 15:42

## 2025-02-13 RX ADMIN — HEPARIN 500 UNITS: 100 SYRINGE at 17:00

## 2025-02-13 NOTE — PROGRESS NOTES
Subjective     Date: 2025    Referring Provider  No ref. provider found    Chief Complaint  Symptomatic anemia   2. Non small Cell Lung Cancer  Cancer Staging   Stage IIIA (cT1c, cN2, cM0)        Subjective      Mayra Hays is a 79 y.o. female who presents today to [facility] for follow up.    HPI:   79 y.o. female with past medical history of diabetes mellitus type 2, hyperlipidemia, COPD, hypertension, atrial fibrillation on anticoagulation (Eliquis), h/o CVA and previous tobacco use presents for symptomatic anemia and NSCLC.    She is present today with her daughter, Hailey, who is assisting with history. Pt started work up for RUL lung nodule , since then noticed to have a decrease in her Hgb to 10.7 from normal in 2023. More recently, her Hgb 8/9 was 6.5, she was directed to ED where she received 1U PRBC.     Patient reports increased fatigue over the last several months. She reports no bleeding, however does report dark colored stool (melena) two days ago. Denies any other evidence of melena or hematochezia.     She has previously been diagnosed with iron deficiency (years ago) requiring oral iron supplements, but never received IV iron or bone marrow biopsy.    She and her daughter are unsure of her last colonoscopy and endoscopy history, think it may have occurred >5 years ago but unsure.    She denies recent weight loss, fever, chills,  night sweats.  Previous smoker, quit 5-6 years ago, smoked 3 packs/day X 30 years. Denies alcohol or drug use. Family history significant for son with leukemia, brothers with lung cancer.     Oncology History:  2023: low dose CT chest screenin.9 cm spiculated right upper lobe pulmonary nodule concerning for malignancy, PET/CT recommended.   2023: EBUS by Dr. Peter negative for malignancy for bronchial washing and FNA of station 10R  2023: PET/CT: Right upper lobe pulmonary nodule, more  posterior possible satellite nodules are postobstructive process measuring 2.1 cm with mSUV 14.5. Also with pretracheal region lymph node 2.6 cm.   6/29/2023: CT guided needle biopsy was non diagnostic, showed fibrosis and chronic inflammation.   8/9/2023: Lab work showed anemia with Hgb 6.5. Referred to ED. Bronchoscopy deferred.   9/6/2023: Navigational bronchoscopy performed by Dr. Clemens- Right upper lobe lung transbronchial biopsy revealed squamous cell carcinoma, lymph node at station 4R also involved with squamous cell carcinoma. PD-L1 TPS 60%. Patient not deemed a surgical candidate.  9/13-11/21/2023: Received weekly carboplatin  X 7 and radiation. Patient had a reaction to paclitaxel, it was omitted.   12/19/2023: Post treatment CT chest with improvement of masslike process RUL now 1.6 cm, suggest significant response to treatment. Mediastinal lymph nodes are now within normal limits of size.   January 26 2024-February 13 2025: Consolidation therapy with durvalumab     Ms. Hays presents today with her daughter, grand daughter, and great grand daughters. She is in a wheelchair. She lives alone with her grand son, able to perform her ADLs including cooking, cleaning.     Treatment history:        2.       Interval History 10/11/2023   Ms. Hays presents for follow up today, accompanied by her daughter in law. She started treatment with paclitaxel and carboplatin on 10/9, unfortunately had a anaphylactic reaction to taxol after 8 minutes of infusion causing her to go to the ED. Patient was awake and feeling back to herself at the time of ED visit. Carboplatin was not administered.     She reports doing well overall, no new symptoms. Started radiation therapy yesterday.      Interval History 10/24/2023   Ms. Hays presents for cycle 2 of carboplatin with concurrent radiation. Did well with first cycle. Denies any adverse effects including nausea, vomiting, diarrhea, neuropathy. Appetite is good. Radiation is  going well. No complaints today.  Denies any skin rash or bites.    Interval History 11/21/2023   Ms. Hays presents today for cycle 7 of carboplatin with concurrent radiation. She reports good tolerance thus far without neuropathy, nausea, vomiting, diarrhea. Appetite is stable.   Denies any GIB.    Interval History 01/11/2024   Ms. Hays presents after completion of therapy. She was admitted to the hospital 12/31 due to shortness of breath, found to have Afib with RVR and coronavirus HKU. She received inpatient antibiotics and steroids. Currently with improvement, still reports some fatigue.   We reviewed her last CT chest, which shows improvement after treatment. Discussed continuing immunotherapy.      Interval History 02/22/2024   Ms. Hays presents today for follow, prior to cycle 3 of maintenance durvalumab. Accompanied by her grand daughter's fiance. She reports improved energy. Denies any bleeding, nausea, vomiting, diarrhea, rash. On examination, noted to have petechia on bilateral distal lower extremities and R distal upper extremities, she believes these started today. Denies pruritus.     Interval History 03/21/2024    presents prior to C5 of consolidation therapy with durvalumab. She reports good tolerance to the last treatment, denies shortness of breath, diarrhea, nausea/vomiting, fatigue. She's been applying lotion to her legs, petechia has decreased, primarily just on her left leg now.     Interval History 04/18/2024   Ms. Hays presents prior to C7 of durvalumab. Reports good tolerance without nausea, vomiting, diarrhea. No changes in petechia. Denies any other complaints.    Interval History 06/13/2024   Ms. Hays presents for follow up prior to cycle 11 of durvalumab. She presented to ED 6/6 due to shortness of breath and cough. CXR showed RUL and left basilar airspace disease for which she received levofloxacin. Today with improvement in symptoms. Denies diarrhea, nausea, vomiting.      Interval History 07/18/2024   Ms. Hays presents for follow-up, prior to cycle 13 of consolidation Durvalumab. She reports good tolerance, denies any cough/shortness of breath, NVD. Continues to have non pruritic petechia on her legs.     Interval History 08/15/2024   Ms. Hays presents for follow up, prior to C 15 of Durvalumab. Surveillance CT chest 8/1 shows airspace disease with volume loss right upper lobe similar to prior exam, felt to reflect post treatment related change.   Denies any new symptoms, continues to do well with tx.    Interval History 08/29/2024  Ms. Hays presents today for follow up, prior to cycle 16 Durvalumab. Overall, she reports that she has been doing well since her last visit. She continues to tolerate the treatments well and without any noticeable side effects. She reports a good appetite, hydrating well. Denies any nausea/vomiting or diarrhea. No rash. She is without any specific complaints at this time.     Interval History 09/12/2024   Ms. Hays presents for follow up, prior to C17 of consolidation therapy with durvalumab. She reports doing well without nausea, vomiting, diarrhea. No changes in breathing or cough. Continues to use intermittent oxygen. No new symptoms.     Interval History 11/21/2024   Ms. Hays presents for C22 of durvalumab. Surveillance CT chest shows volume loss right suprahilar consistent with evolving fibrosis, no pulmonary nodule or mass right lung to suggest recurrent disease, no hilar mediastinal adenopathy, centrilobular nodules throughout left mid lower lung zones consistent with atypical pneumonia or aspiration pneumonitis.     She does report recovering from a recent pneumonia, received antibiotics from her PCP. Denies fever/chills. No other adverse effects with last treatments.    Interval History 01/03/2025   Since our last visit, Ms. Hays had been admitted to the hospital for pneumonia. After discharge, was diagnosed with rhinovirus. This has  delayed her treatments. Reports feeling much better today. Had dysguesia during sickness, which has subsided and improved. Denies shortness of breath or cough. Notes intermittent swelling of the leg, which improves with raising of the leg.    Interval History 02/13/2025   Ms. Hays presents for her last dose of durvalumab. She reports no adverse effects to therapy thus far. Doing well overall. No further shortness of breath or cough.     Objective     Objective     Allergy:   Allergies   Allergen Reactions    Paclitaxel Anaphylaxis        Current Medications:   Current Outpatient Medications   Medication Sig Dispense Refill    albuterol sulfate  (90 Base) MCG/ACT inhaler Inhale 2 puffs by mouth Every 4 (Four) Hours As Needed for Wheezing. 18 g 6    apixaban (ELIQUIS) 5 MG tablet tablet Take 1 tablet by mouth Every 12 (Twelve) Hours. 180 tablet 3    atorvastatin (LIPITOR) 20 MG tablet Take 1 tablet by mouth Daily.      benzonatate (Tessalon Perles) 100 MG capsule Take 1 capsule by mouth 3 (Three) Times a Day As Needed for Cough. 30 capsule 0    Budeson-Glycopyrrol-Formoterol (Breztri Aerosphere) 160-9-4.8 MCG/ACT aerosol inhaler Inhale 2 puffs by mouth 2 (Two) Times a Day. 32.1 g 3    esomeprazole (nexIUM) 20 MG capsule Take 1 capsule by mouth Daily.      hydrOXYzine pamoate (VISTARIL) 25 MG capsule Take 1 capsule by mouth Every Night.      lidocaine-prilocaine (EMLA) 2.5-2.5 % cream Apply 1 Application topically to the appropriate area as directed As Needed for Mild Pain. 5 g 3    lisinopril (PRINIVIL,ZESTRIL) 10 MG tablet Take 1 tablet by mouth Daily. 30 tablet 2    metFORMIN (GLUCOPHAGE) 500 MG tablet Take 1 tablet by mouth 2 (Two) Times a Day As Needed (only takes if blood glucose is above 200). As needed       No current facility-administered medications for this visit.     Facility-Administered Medications Ordered in Other Visits   Medication Dose Route Frequency Provider Last Rate Last Admin     Durvalumab 1,000 mg in sodium chloride 0.9 % 270 mL chemo IVPB  1,000 mg Intravenous Once Shawna Bond MD        heparin injection 500 Units  500 Units Intravenous PRN Gilbert, Bailee Sharmaine, APRN   500 Units at 01/26/24 1558    heparin injection 500 Units  500 Units Intravenous PRN Gilbert, Bailee Sharmaine, APRN   500 Units at 11/21/24 1654    heparin injection 500 Units  500 Units Intravenous PRN Gilbert, Bailee Sharmaine, APRN        heparin injection 500 Units  500 Units Intravenous PRN Gilbert, Bailee Sharmaine, APRN        sodium chloride 0.9 % flush 10 mL  10 mL Intravenous PRN Gilbert, Bailee Sharmaine, APRN        sodium chloride 0.9 % flush 10 mL  10 mL Intravenous PRN Gilbert, Bailee Sharmaine, APRN   10 mL at 11/21/24 1654    sodium chloride 0.9 % flush 10 mL  10 mL Intravenous PRN Gilbert, Bailee Sharmaine, APRN        sodium chloride 0.9 % flush 10 mL  10 mL Intravenous PRN Gilbert, Bailee Sharmaine, APRN           Past Medical History:  Past Medical History:   Diagnosis Date    Arthritis     CHF (congestive heart failure)     Chronic anticoagulation     Eliquis    Chronic kidney disease     stage II/IIIa    Collapsed lung     COPD (chronic obstructive pulmonary disease)     Diabetes mellitus     TYPE 2    Elevated cholesterol     GERD (gastroesophageal reflux disease)     Hyperlipidemia     Hypertension     Non-small cell lung cancer RUL     Sleep apnea     non compliant with CPAP    Stroke 2019    Wears eyeglasses        Past Surgical History:  Past Surgical History:   Procedure Laterality Date    BRONCHOSCOPY Bilateral 02/27/2023    Procedure: BRONCHOSCOPY WITH ENDOBRONCHIAL ULTRASOUND;  Surgeon: Abdulkadir Peter MD;  Location: [facility] OR;  Service: Pulmonary;  Laterality: Bilateral;    BRONCHOSCOPY WITH ION ROBOTIC ASSIST N/A 9/6/2023    Procedure: BRONCHOSCOPY WITH ION ROBOT AND EBUS;  Surgeon: John Clemens MD;  Location:  [facility];  Service: Robotics - Pulmonary;  Laterality: N/A;  Ion cath #6 - 0032,  #6 -  0030, cath guide # 0077. EBUS scope removed with balloon intact.    CARDIAC CATHETERIZATION N/A 08/22/2017    Procedure: Left Heart Cath;  Surgeon: Jatinder Matias MD;  [facility];  Service:     CARDIAC CATHETERIZATION N/A 11/22/2021    Procedure: Left Heart Cath;  Surgeon: Tolu Steinberg MD;  Location:  [facility];  Service: Cardiology;  Laterality: N/A;    COLONOSCOPY      ENDOSCOPY      GALLBLADDER SURGERY      PORTACATH PLACEMENT N/A 9/29/2023    Procedure: INSERTION OF PORTACATH;  Surgeon: Petr Velásquez MD;  Location:  [facility] OR;  Service: General;  Laterality: N/A;    VENTRAL HERNIA REPAIR N/A 05/14/2020    Procedure: VENTRAL HERNIA REPAIR LAPAROSCOPIC WITH DAVINCI ROBOT;  Surgeon: Petr Veláqsuez MD;  Location:  [facility] OR;  Service: DaVinci;  Laterality: N/A;       Social History:  Social History     Socioeconomic History    Marital status: Single    Number of children: 6   Tobacco Use    Smoking status: Former     Current packs/day: 0.00     Average packs/day: 3.0 packs/day for 16.7 years (50.2 ttl pk-yrs)     Types: Cigarettes     Start date: 4/3/1998     Quit date: 1/1/2015     Years since quitting: 10.1     Passive exposure: Past    Smokeless tobacco: Never   Vaping Use    Vaping status: Never Used   Substance and Sexual Activity    Alcohol use: No    Drug use: No    Sexual activity: Never         Family History:  Family History   Problem Relation Age of Onset    Heart disease Mother         Rhianna sophia    Heart disease Father     Heart attack Father     Heart failure Father        Review of Systems:  Review of Systems   Constitutional:  Positive for fatigue.   All other systems reviewed and are negative.      Vital Signs:   There were no vitals taken for this visit.     Physical Exam:  Physical Exam  Vitals reviewed.   Constitutional:       General: She is not in acute distress.     Appearance: Normal appearance. She is obese. She is not  ill-appearing.      Comments: +hard of hearing   HENT:      Head: Normocephalic and atraumatic.      Mouth/Throat:      Mouth: Mucous membranes are moist.      Pharynx: Oropharynx is clear.   Eyes:      Conjunctiva/sclera: Conjunctivae normal.      Pupils: Pupils are equal, round, and reactive to light.   Cardiovascular:      Rate and Rhythm: Normal rate and regular rhythm.      Heart sounds: No murmur heard.  Pulmonary:      Effort: Pulmonary effort is normal. No respiratory distress.      Breath sounds: Wheezing present.   Chest:      Comments: +R chest port  Abdominal:      General: Abdomen is flat. Bowel sounds are normal. There is no distension.      Palpations: Abdomen is soft. There is no mass.      Tenderness: There is no abdominal tenderness. There is no guarding.   Musculoskeletal:         General: No swelling. Normal range of motion.      Cervical back: Normal range of motion.   Lymphadenopathy:      Cervical: No cervical adenopathy.   Skin:     General: Skin is warm and dry.      Comments: RLE petechiae    Neurological:      General: No focal deficit present.      Mental Status: She is alert and oriented to person, place, and time. Mental status is at baseline.   Psychiatric:         Mood and Affect: Mood normal.         PHQ-9 Score  PHQ-9 Total Score:       Pain Score  There were no vitals filed for this visit.                                    PAINSCOREQUALITYMETRIC:   There were no vitals filed for this visit.                           Lab Review  Lab Results   Component Value Date    WBC 5.94 02/13/2025    HGB 12.2 02/13/2025    HCT 39.4 02/13/2025    MCV 86.6 02/13/2025    RDW 13.6 02/13/2025     02/13/2025    NEUTRORELPCT 64.5 02/13/2025    LYMPHORELPCT 19.7 02/13/2025    MONORELPCT 8.2 02/13/2025    EOSRELPCT 6.1 02/13/2025    BASORELPCT 1.2 02/13/2025    NEUTROABS 3.83 02/13/2025    LYMPHSABS 1.17 02/13/2025     Lab Results   Component Value Date     02/13/2025    K 4.0 02/13/2025     CO2 27.3 02/13/2025     02/13/2025    BUN 19 02/13/2025    CREATININE 1.02 (H) 02/13/2025    EGFRIFNONA 51 (L) 11/22/2021    GLUCOSE 176 (H) 02/13/2025    CALCIUM 9.3 02/13/2025    ALKPHOS 108 02/13/2025    AST 14 02/13/2025    ALT 12 02/13/2025    BILITOT 0.2 02/13/2025    ALBUMIN 4.1 02/13/2025    PROTEINTOT 7.7 02/13/2025    MG 1.9 11/23/2024    PHOS 3.5 05/18/2020     Lab Results   Component Value Date    RETICCTPCT 4.26 (H) 08/16/2023     Lab Results   Component Value Date    FERRITIN 65.31 02/13/2025    IRON 75 02/13/2025    TIBC 410 02/13/2025    LABIRON 18 (L) 02/13/2025    HYAMFRYH37 548 08/14/2023    FOLATE 12.90 08/14/2023        Radiology Results    CT Chest With Contrast Diagnostic (11/11/2024 11:02)     IMPRESSION:  1.  Soft tissue density with volume loss and air bronchograms emanating  from the right suprahilar aspect has features on CT most consistent with  evolving fibrosis.  2.  No discrete pulmonary nodule or mass has developed in the right lung  to suggest recurrent disease.  3.  No hilar or mediastinal adenopathy based on CT size criteria.  4.  Centrilobular nodules throughout the left mid-lower lung zones noted  most consistent with atypical pneumonia or aspiration pneumonitis.  5.  Mild interstitial thickening favors mild edema in the setting of CHF  or volume overload.    CT Chest With Contrast Diagnostic (08/01/2024 14:14)     FINDINGS:  Soft tissue windows demonstrate no adenopathy within the chest. The  thyroid gland is unremarkable. The heart is normal in size. The aorta is  normal in caliber. There are no pleural or pericardial effusions. Lung  windows redemonstrate airspace disease in the right upper lobe with  volume loss felt to reflect posttreatment related change. Scarring is  present in the anterior left upper lobe and right middle lobe. There is  airspace disease in both lung bases similar in appearance to the prior  exam.. The visualized upper abdomen is unremarkable.  Bone windows reveal  no lytic or destructive lesions.     IMPRESSION:  Airspace disease with volume loss in the right upper lobe  similar in appearance to the prior exam felt to reflect posttreatment  related change. Continued follow-up is recommended.      CT Chest With Contrast Diagnostic (05/01/2024 09:01)     IMPRESSION:  1. Development of airspace disease in the right upper lobe which is in  the region of a previously noted spiculated nodule likely reflecting  posttreatment related change.  2. Interval improvement in the previously noted bilateral lower lobe  airspace disease with a few patchy opacities remaining.    CT Chest Without Contrast Diagnostic (12/31/2023 20:30)   IMPRESSION:  Spiculated nodule of the right upper lobe concerning for neoplasm.  Masslike consolidation in the right lower lobe could represent pneumonia  with underlying mass not excluded. Atelectasis/airspace disease in the  left lower lobe and lingula. Mediastinal adenopathy. Follow-up  recommended.    CT Chest With Contrast Diagnostic (12/19/2023 14:47)     IMPRESSION:  1.  Significant decrease size and masslike appearance of process in the  right upper lobe which would suggest significant response to treatment.  2.  No suspicious pulmonary nodules or masses identified.  3.  Mediastinal lymph nodes are now within normal limits for size.  No  hilar or mediastinal adenopathy.  4.  Mild centrilobular nodularity of the left lower lobe which may  reflect changes of atypical pneumonia or aspiration pneumonitis.  5.  Stable cardiomegaly.  6.  Other incidental/nonacute findings above.      CT Angiogram Chest Pulmonary Embolism (10/09/2023 16:41)   MPRESSION:  1.  Right upper lobe mass in association with pneumonia has increased in  size and extent when compared to the previous exam with extension to the  pleural surface. Masslike component is approximately 56.8 x 44.5 mm and  was previously 40.1 x 36.7 mm. This would correspond to  primary  malignancy.  2.  Central bronchial wall thickening. Can be seen with reactive airway  disease or bronchitis.  3.  1.3 cm left adrenal nodule is noted. No significant change from  previous.  4.  Increased size of paratracheal and mediastinal lymph nodes. A right  paratracheal lymph node is 2.5 cm and was previously 2.2 cm.  5. No PE identified.  6. Marked cardiomegaly, stable.  7. Otherwise stable chest with other nonacute/incidental findings as  above.    CT Head Without Contrast (10/09/2023 12:00)   IMPRESSION:    Unremarkable exam demonstrating no CT evidence of acute intracranial  findings.      CT Chest Without Contrast Diagnostic (08/09/2023 12:38)       NM PET/CT Skull Base to Mid Thigh (06/20/2023 11:12)         CT Chest Low Dose Cancer Screening WO (02/14/2023 12:58)   IMPRESSION:    Interval development or enlargement of a 1.9 cm spiculated right upper  lobe pulmonary nodule concerning in appearance for malignancy and PET/CT  is recommended for further evaluation.        Pathology:     Tissue Pathology Exam (09/06/2023 08:13)         6/29/23           Assessment / Plan         Assessment and Plan   Mayra Hays is a 79 y.o. year old presents for       Non small cell lung cancer, squamous cell    Cancer Staging   Stage IIIA (cT1c, cN2, cM0)  -Oncology history as above. Patient with 1.9 cm spiculated right upper lobe nodule on low dose CT chest screen (2/2023). EBUS by Dr. Peter negative for malignancy (2/2023). PET/CT with hypermetabolism RUL 2.1 cm and pretracheal region lymph node (6/2023). CT guided biopsy negative for malignancy (6/2023). Navigational bronchoscopy with positive RUL and  4R (paratracheal) for squamous cell (9/6/2023). PD-L1 TPS score 60%.   -Completed weekly carboplatin AUC2. Patient experienced anaphylaxis 8 minutes into the paclitaxel on first cycle 10/9. Due to this, paclitaxel will be omitted from weekly chemotherapy.  -Radiation therapy 10/10; 6000 cGy in 30 treatment  fractions. Complete 11/21/2023  -Post treatment CT 12/19/23 with good/partial response  -Cont consolidation therapy with durvalumab every 2 weeks; C26/26 today. Repeat CT chest 2/2025    2. Anemia; iron deficiency anemia due to GIB  - Patient with normal H/H in our system 2/2023, with acute drop in Hgb 6/29 (10.7) to Hgb (6.5) on 8/9 requiring transfusion. Patient reports one episode of melena two days ago (she is a poor historian).   -Last colonoscopy and endoscopy are unknown; think it may have been >5 years ago  -CBC from 8/14 show Hgb 8.2, Hct 27, MCV 90. Normal WBC and platelet count. Iron studies with normal iron (37), TIBC (435), and low iron saturation (9). This is in light of recent blood transfusion. Normal folate and B12 level. Reticulocyte count noted to be elevated to 6%  -Patient is with fatigue. Denies shortness of breath (aside from baseline COPD) or chest pain. Continues to be on Eliquis for Afib  -Initial work up from consultation confirmed iron deficiency anemia, normal folate/b12 levels. No indication of hemolysis seen with normal LDH and haptoglobin. Myeloma work up has been negative with no M spike on serum protein electrophoresis and normal k/l free light chain ratio. Peripheral smear with normal total WBC without granulocytic dysplasia or blasts.  -Received Ferumoxytol 8/29/2023 and 11/27/23  -Pt to see surgery post treatment for repeat EGD and Colonoscopy     3. Malignancy associated pain  - Currently denies    4. Surveillance (per NCCN)  -Surveillance would include CT with and without contrast every 3 to 6 months for 3 years, then every 6 months for 2 years, then low-dose noncontrast CT annually.   Next CT 2/2025    5. Petechia of lower extremities -resolved   - Normal Plt counts    Discussed possible differential diagnoses, testing, treatment, recommended non-pharmacological interventions, risks, warning signs to monitor for that would indicate need for follow-up in clinic or ER. If no  improvement with these regimens or you have new or worsening symptoms follow-up. Patient verbalizes understanding and agreement with plan of care. Denies further needs or concerns.     Patient was given instructions and counseling regarding her condition and for health maintenance advised.       All questions were answered to her satisfaction.              Meds ordered during this visit  No orders of the defined types were placed in this encounter.      Visit Diagnoses    ICD-10-CM ICD-9-CM   1. Non-small cell cancer of right lung  C34.91 162.9   2. Iron deficiency anemia, unspecified iron deficiency anemia type  D50.9 280.9                                   Follow Up   In 12 weeks: CBC, iron studies, ferritin, CMP, folate, B12  FU CT chest         This document has been electronically signed by Shawna Bond MD   February 13, 2025 15:30 EST    Dictated Utilizing Dragon Dictation: Part of this note may be an electronic transcription/translation of spoken language to printed text using the Dragon Dictation System.

## 2025-02-14 LAB
FOLATE SERPL-MCNC: 10.3 NG/ML (ref 4.78–24.2)
VIT B12 BLD-MCNC: 458 PG/ML (ref 211–946)

## 2025-02-20 ENCOUNTER — PATIENT OUTREACH (OUTPATIENT)
Dept: ONCOLOGY | Facility: CLINIC | Age: 80
End: 2025-02-20
Payer: MEDICARE

## 2025-03-03 ENCOUNTER — HOSPITAL ENCOUNTER (INPATIENT)
Facility: HOSPITAL | Age: 80
LOS: 4 days | Discharge: HOME | DRG: 193 | End: 2025-03-07
Admitting: INTERNAL MEDICINE
Payer: MEDICARE

## 2025-03-03 ENCOUNTER — APPOINTMENT (OUTPATIENT)
Dept: GENERAL RADIOLOGY | Facility: HOSPITAL | Age: 80
DRG: 193 | End: 2025-03-03
Payer: MEDICARE

## 2025-03-03 DIAGNOSIS — E87.29 RESPIRATORY ACIDOSIS: ICD-10-CM

## 2025-03-03 DIAGNOSIS — J18.9 PNEUMONIA OF RIGHT UPPER LOBE DUE TO INFECTIOUS ORGANISM: Primary | ICD-10-CM

## 2025-03-03 DIAGNOSIS — J18.9 PNEUMONIA OF LEFT LOWER LOBE DUE TO INFECTIOUS ORGANISM: ICD-10-CM

## 2025-03-03 PROBLEM — J96.90 RESPIRATORY FAILURE: Status: ACTIVE | Noted: 2025-03-03

## 2025-03-03 LAB
A-A DO2: 38.9 MMHG (ref 0–300)
A-A DO2: 79.6 MMHG (ref 0–300)
ALBUMIN SERPL-MCNC: 4.3 G/DL (ref 3.5–5.2)
ALBUMIN/GLOB SERPL: 1.2 G/DL
ALP SERPL-CCNC: 92 U/L (ref 39–117)
ALT SERPL W P-5'-P-CCNC: 14 U/L (ref 1–33)
ANION GAP SERPL CALCULATED.3IONS-SCNC: 5.6 MMOL/L (ref 5–15)
ARTERIAL PATENCY WRIST A: POSITIVE
ARTERIAL PATENCY WRIST A: POSITIVE
AST SERPL-CCNC: 18 U/L (ref 1–32)
ATMOSPHERIC PRESS: 729 MMHG
ATMOSPHERIC PRESS: 729 MMHG
B PARAPERT DNA SPEC QL NAA+PROBE: NOT DETECTED
B PERT DNA SPEC QL NAA+PROBE: NOT DETECTED
BASE EXCESS BLDA CALC-SCNC: -0.6 MMOL/L (ref 0–2)
BASE EXCESS BLDA CALC-SCNC: 0.6 MMOL/L (ref 0–2)
BASOPHILS # BLD AUTO: 0.02 10*3/MM3 (ref 0–0.2)
BASOPHILS NFR BLD AUTO: 0.4 % (ref 0–1.5)
BDY SITE: ABNORMAL
BDY SITE: ABNORMAL
BILIRUB SERPL-MCNC: 0.3 MG/DL (ref 0–1.2)
BUN SERPL-MCNC: 19 MG/DL (ref 8–23)
BUN/CREAT SERPL: 15.3 (ref 7–25)
C PNEUM DNA NPH QL NAA+NON-PROBE: NOT DETECTED
CALCIUM SPEC-SCNC: 9 MG/DL (ref 8.6–10.5)
CHLORIDE SERPL-SCNC: 99 MMOL/L (ref 98–107)
CO2 BLDA-SCNC: 28.3 MMOL/L (ref 22–33)
CO2 BLDA-SCNC: 30.1 MMOL/L (ref 22–33)
CO2 SERPL-SCNC: 31.4 MMOL/L (ref 22–29)
COHGB MFR BLD: 1.2 % (ref 0–5)
COHGB MFR BLD: 1.3 % (ref 0–5)
CREAT SERPL-MCNC: 1.24 MG/DL (ref 0.57–1)
D-LACTATE SERPL-SCNC: 0.7 MMOL/L (ref 0.5–2)
DEPRECATED RDW RBC AUTO: 42.4 FL (ref 37–54)
EGFRCR SERPLBLD CKD-EPI 2021: 44.6 ML/MIN/1.73
EOSINOPHIL # BLD AUTO: 0.12 10*3/MM3 (ref 0–0.4)
EOSINOPHIL NFR BLD AUTO: 2.6 % (ref 0.3–6.2)
EPAP: 6
ERYTHROCYTE [DISTWIDTH] IN BLOOD BY AUTOMATED COUNT: 13.2 % (ref 12.3–15.4)
FLUAV H1 2009 PAND RNA NPH QL NAA+PROBE: DETECTED
FLUBV RNA ISLT QL NAA+PROBE: NOT DETECTED
GAS FLOW AIRWAY: 3 LPM
GEN 5 1HR TROPONIN T REFLEX: 15 NG/L
GLOBULIN UR ELPH-MCNC: 3.5 GM/DL
GLUCOSE SERPL-MCNC: 157 MG/DL (ref 65–99)
HADV DNA SPEC NAA+PROBE: NOT DETECTED
HCO3 BLDA-SCNC: 26.6 MMOL/L (ref 20–26)
HCO3 BLDA-SCNC: 28.3 MMOL/L (ref 20–26)
HCOV 229E RNA SPEC QL NAA+PROBE: NOT DETECTED
HCOV HKU1 RNA SPEC QL NAA+PROBE: NOT DETECTED
HCOV NL63 RNA SPEC QL NAA+PROBE: NOT DETECTED
HCOV OC43 RNA SPEC QL NAA+PROBE: NOT DETECTED
HCT VFR BLD AUTO: 39.7 % (ref 34–46.6)
HCT VFR BLD CALC: 37.7 % (ref 38–51)
HCT VFR BLD CALC: 38.1 % (ref 38–51)
HGB BLD-MCNC: 12.1 G/DL (ref 12–15.9)
HGB BLDA-MCNC: 12.3 G/DL (ref 13.5–17.5)
HGB BLDA-MCNC: 12.4 G/DL (ref 13.5–17.5)
HMPV RNA NPH QL NAA+NON-PROBE: NOT DETECTED
HOLD SPECIMEN: NORMAL
HOLD SPECIMEN: NORMAL
HPIV1 RNA ISLT QL NAA+PROBE: NOT DETECTED
HPIV2 RNA SPEC QL NAA+PROBE: NOT DETECTED
HPIV3 RNA NPH QL NAA+PROBE: NOT DETECTED
HPIV4 P GENE NPH QL NAA+PROBE: NOT DETECTED
IMM GRANULOCYTES # BLD AUTO: 0.01 10*3/MM3 (ref 0–0.05)
IMM GRANULOCYTES NFR BLD AUTO: 0.2 % (ref 0–0.5)
INHALED O2 CONCENTRATION: 24 %
INHALED O2 CONCENTRATION: 32 %
INR PPP: 1.12 (ref 0.9–1.1)
IPAP: 24
LYMPHOCYTES # BLD AUTO: 0.78 10*3/MM3 (ref 0.7–3.1)
LYMPHOCYTES NFR BLD AUTO: 17.2 % (ref 19.6–45.3)
Lab: ABNORMAL
M PNEUMO IGG SER IA-ACNC: NOT DETECTED
MCH RBC QN AUTO: 26.7 PG (ref 26.6–33)
MCHC RBC AUTO-ENTMCNC: 30.5 G/DL (ref 31.5–35.7)
MCV RBC AUTO: 87.4 FL (ref 79–97)
METHGB BLD QL: 0.1 % (ref 0–3)
METHGB BLD QL: 0.1 % (ref 0–3)
MODALITY: ABNORMAL
MODALITY: ABNORMAL
MONOCYTES # BLD AUTO: 0.56 10*3/MM3 (ref 0.1–0.9)
MONOCYTES NFR BLD AUTO: 12.4 % (ref 5–12)
NEUTROPHILS NFR BLD AUTO: 3.04 10*3/MM3 (ref 1.7–7)
NEUTROPHILS NFR BLD AUTO: 67.2 % (ref 42.7–76)
NOTE: ABNORMAL
NOTE: ABNORMAL
NOTIFIED BY: ABNORMAL
NOTIFIED BY: ABNORMAL
NOTIFIED WHO: ABNORMAL
NOTIFIED WHO: ABNORMAL
NRBC BLD AUTO-RTO: 0 /100 WBC (ref 0–0.2)
NT-PROBNP SERPL-MCNC: 254.2 PG/ML (ref 0–1800)
OXYHGB MFR BLDV: 90.4 % (ref 94–99)
OXYHGB MFR BLDV: 92.9 % (ref 94–99)
PCO2 BLDA: 54.2 MM HG (ref 35–45)
PCO2 BLDA: 58.8 MM HG (ref 35–45)
PCO2 TEMP ADJ BLD: ABNORMAL MM[HG]
PCO2 TEMP ADJ BLD: ABNORMAL MM[HG]
PH BLDA: 7.29 PH UNITS (ref 7.35–7.45)
PH BLDA: 7.3 PH UNITS (ref 7.35–7.45)
PH, TEMP CORRECTED: ABNORMAL
PH, TEMP CORRECTED: ABNORMAL
PLATELET # BLD AUTO: 152 10*3/MM3 (ref 140–450)
PMV BLD AUTO: 10.9 FL (ref 6–12)
PO2 BLDA: 63.9 MM HG (ref 83–108)
PO2 BLDA: 73.5 MM HG (ref 83–108)
PO2 TEMP ADJ BLD: ABNORMAL MM[HG]
PO2 TEMP ADJ BLD: ABNORMAL MM[HG]
POTASSIUM SERPL-SCNC: 4.2 MMOL/L (ref 3.5–5.2)
PROT SERPL-MCNC: 7.8 G/DL (ref 6–8.5)
PROTHROMBIN TIME: 14.5 SECONDS (ref 11.6–15.1)
QT INTERVAL: 386 MS
QTC INTERVAL: 456 MS
RBC # BLD AUTO: 4.54 10*6/MM3 (ref 3.77–5.28)
RHINOVIRUS RNA SPEC NAA+PROBE: NOT DETECTED
RSV RNA NPH QL NAA+NON-PROBE: NOT DETECTED
SAO2 % BLDCOA: 91.7 % (ref 94–99)
SAO2 % BLDCOA: 94.1 % (ref 94–99)
SARS-COV-2 RNA RESP QL NAA+PROBE: NOT DETECTED
SET MECH RESP RATE: 18
SODIUM SERPL-SCNC: 136 MMOL/L (ref 136–145)
TROPONIN T % DELTA: 0
TROPONIN T NUMERIC DELTA: 0 NG/L
TROPONIN T SERPL HS-MCNC: 15 NG/L
VENTILATOR MODE: ABNORMAL
VENTILATOR MODE: ABNORMAL
WBC NRBC COR # BLD AUTO: 4.53 10*3/MM3 (ref 3.4–10.8)
WHOLE BLOOD HOLD COAG: NORMAL
WHOLE BLOOD HOLD SPECIMEN: NORMAL

## 2025-03-03 PROCEDURE — 82375 ASSAY CARBOXYHB QUANT: CPT

## 2025-03-03 PROCEDURE — 99223 1ST HOSP IP/OBS HIGH 75: CPT

## 2025-03-03 PROCEDURE — 25010000002 CEFEPIME PER 500 MG

## 2025-03-03 PROCEDURE — 93010 ELECTROCARDIOGRAM REPORT: CPT | Performed by: INTERNAL MEDICINE

## 2025-03-03 PROCEDURE — 83050 HGB METHEMOGLOBIN QUAN: CPT

## 2025-03-03 PROCEDURE — 82805 BLOOD GASES W/O2 SATURATION: CPT

## 2025-03-03 PROCEDURE — 99285 EMERGENCY DEPT VISIT HI MDM: CPT

## 2025-03-03 PROCEDURE — 94799 UNLISTED PULMONARY SVC/PX: CPT

## 2025-03-03 PROCEDURE — 93005 ELECTROCARDIOGRAM TRACING: CPT

## 2025-03-03 PROCEDURE — 36600 WITHDRAWAL OF ARTERIAL BLOOD: CPT

## 2025-03-03 PROCEDURE — 85610 PROTHROMBIN TIME: CPT | Performed by: STUDENT IN AN ORGANIZED HEALTH CARE EDUCATION/TRAINING PROGRAM

## 2025-03-03 PROCEDURE — 71045 X-RAY EXAM CHEST 1 VIEW: CPT | Performed by: RADIOLOGY

## 2025-03-03 PROCEDURE — 71045 X-RAY EXAM CHEST 1 VIEW: CPT

## 2025-03-03 PROCEDURE — 80053 COMPREHEN METABOLIC PANEL: CPT | Performed by: STUDENT IN AN ORGANIZED HEALTH CARE EDUCATION/TRAINING PROGRAM

## 2025-03-03 PROCEDURE — 84484 ASSAY OF TROPONIN QUANT: CPT | Performed by: STUDENT IN AN ORGANIZED HEALTH CARE EDUCATION/TRAINING PROGRAM

## 2025-03-03 PROCEDURE — 94660 CPAP INITIATION&MGMT: CPT

## 2025-03-03 PROCEDURE — 25010000002 METHYLPREDNISOLONE PER 125 MG

## 2025-03-03 PROCEDURE — 36415 COLL VENOUS BLD VENIPUNCTURE: CPT | Performed by: STUDENT IN AN ORGANIZED HEALTH CARE EDUCATION/TRAINING PROGRAM

## 2025-03-03 PROCEDURE — 85025 COMPLETE CBC W/AUTO DIFF WBC: CPT | Performed by: STUDENT IN AN ORGANIZED HEALTH CARE EDUCATION/TRAINING PROGRAM

## 2025-03-03 PROCEDURE — 93005 ELECTROCARDIOGRAM TRACING: CPT | Performed by: STUDENT IN AN ORGANIZED HEALTH CARE EDUCATION/TRAINING PROGRAM

## 2025-03-03 PROCEDURE — 83880 ASSAY OF NATRIURETIC PEPTIDE: CPT | Performed by: STUDENT IN AN ORGANIZED HEALTH CARE EDUCATION/TRAINING PROGRAM

## 2025-03-03 PROCEDURE — 94640 AIRWAY INHALATION TREATMENT: CPT

## 2025-03-03 PROCEDURE — 0202U NFCT DS 22 TRGT SARS-COV-2: CPT | Performed by: STUDENT IN AN ORGANIZED HEALTH CARE EDUCATION/TRAINING PROGRAM

## 2025-03-03 PROCEDURE — 83605 ASSAY OF LACTIC ACID: CPT

## 2025-03-03 RX ORDER — VANCOMYCIN 2 GRAM/500 ML IN 0.9 % SODIUM CHLORIDE INTRAVENOUS
2000 ONCE
Status: COMPLETED | OUTPATIENT
Start: 2025-03-04 | End: 2025-03-04

## 2025-03-03 RX ORDER — OSELTAMIVIR PHOSPHATE 30 MG/1
30 CAPSULE ORAL EVERY 12 HOURS SCHEDULED
Status: DISCONTINUED | OUTPATIENT
Start: 2025-03-04 | End: 2025-03-07 | Stop reason: HOSPADM

## 2025-03-03 RX ORDER — NICOTINE POLACRILEX 4 MG
15 LOZENGE BUCCAL
Status: DISCONTINUED | OUTPATIENT
Start: 2025-03-03 | End: 2025-03-07 | Stop reason: HOSPADM

## 2025-03-03 RX ORDER — ACETAMINOPHEN 500 MG
1000 TABLET ORAL EVERY 8 HOURS PRN
Status: DISCONTINUED | OUTPATIENT
Start: 2025-03-03 | End: 2025-03-07 | Stop reason: HOSPADM

## 2025-03-03 RX ORDER — NITROGLYCERIN 0.4 MG/1
0.4 TABLET SUBLINGUAL
Status: DISCONTINUED | OUTPATIENT
Start: 2025-03-03 | End: 2025-03-07 | Stop reason: HOSPADM

## 2025-03-03 RX ORDER — HEPARIN SODIUM 5000 [USP'U]/ML
5000 INJECTION, SOLUTION INTRAVENOUS; SUBCUTANEOUS EVERY 12 HOURS SCHEDULED
Status: DISCONTINUED | OUTPATIENT
Start: 2025-03-04 | End: 2025-03-04

## 2025-03-03 RX ORDER — BISACODYL 10 MG
10 SUPPOSITORY, RECTAL RECTAL DAILY PRN
Status: DISCONTINUED | OUTPATIENT
Start: 2025-03-03 | End: 2025-03-07 | Stop reason: HOSPADM

## 2025-03-03 RX ORDER — SODIUM CHLORIDE 0.9 % (FLUSH) 0.9 %
10 SYRINGE (ML) INJECTION AS NEEDED
Status: DISCONTINUED | OUTPATIENT
Start: 2025-03-03 | End: 2025-03-07 | Stop reason: HOSPADM

## 2025-03-03 RX ORDER — GLUCAGON 1 MG/ML
1 KIT INJECTION
Status: DISCONTINUED | OUTPATIENT
Start: 2025-03-03 | End: 2025-03-07 | Stop reason: HOSPADM

## 2025-03-03 RX ORDER — VANCOMYCIN/0.9 % SOD CHLORIDE 1 G/100 ML
1000 PLASTIC BAG, INJECTION (ML) INTRAVENOUS
Status: DISCONTINUED | OUTPATIENT
Start: 2025-03-04 | End: 2025-03-06

## 2025-03-03 RX ORDER — NYSTATIN 100000 [USP'U]/G
1 POWDER TOPICAL 4 TIMES DAILY
COMMUNITY

## 2025-03-03 RX ORDER — SODIUM CHLORIDE 0.9 % (FLUSH) 0.9 %
10 SYRINGE (ML) INJECTION EVERY 12 HOURS SCHEDULED
Status: DISCONTINUED | OUTPATIENT
Start: 2025-03-04 | End: 2025-03-07 | Stop reason: HOSPADM

## 2025-03-03 RX ORDER — PROMETHAZINE HYDROCHLORIDE 12.5 MG/1
12.5 TABLET ORAL EVERY 6 HOURS PRN
Status: ON HOLD | COMMUNITY
End: 2025-03-04

## 2025-03-03 RX ORDER — IPRATROPIUM BROMIDE AND ALBUTEROL SULFATE 2.5; .5 MG/3ML; MG/3ML
3 SOLUTION RESPIRATORY (INHALATION) ONCE
Status: COMPLETED | OUTPATIENT
Start: 2025-03-03 | End: 2025-03-03

## 2025-03-03 RX ORDER — POLYETHYLENE GLYCOL 3350 17 G/17G
17 POWDER, FOR SOLUTION ORAL DAILY PRN
Status: DISCONTINUED | OUTPATIENT
Start: 2025-03-03 | End: 2025-03-07 | Stop reason: HOSPADM

## 2025-03-03 RX ORDER — SODIUM CHLORIDE 9 MG/ML
40 INJECTION, SOLUTION INTRAVENOUS AS NEEDED
Status: DISCONTINUED | OUTPATIENT
Start: 2025-03-03 | End: 2025-03-07 | Stop reason: HOSPADM

## 2025-03-03 RX ORDER — IPRATROPIUM BROMIDE AND ALBUTEROL SULFATE 2.5; .5 MG/3ML; MG/3ML
3 SOLUTION RESPIRATORY (INHALATION) EVERY 6 HOURS PRN
Status: DISCONTINUED | OUTPATIENT
Start: 2025-03-03 | End: 2025-03-07 | Stop reason: HOSPADM

## 2025-03-03 RX ORDER — DEXTROSE MONOHYDRATE 25 G/50ML
25 INJECTION, SOLUTION INTRAVENOUS
Status: DISCONTINUED | OUTPATIENT
Start: 2025-03-03 | End: 2025-03-07 | Stop reason: HOSPADM

## 2025-03-03 RX ORDER — AMOXICILLIN 250 MG
2 CAPSULE ORAL 2 TIMES DAILY
Status: DISCONTINUED | OUTPATIENT
Start: 2025-03-04 | End: 2025-03-07 | Stop reason: HOSPADM

## 2025-03-03 RX ORDER — BISACODYL 5 MG/1
5 TABLET, DELAYED RELEASE ORAL DAILY PRN
Status: DISCONTINUED | OUTPATIENT
Start: 2025-03-03 | End: 2025-03-07 | Stop reason: HOSPADM

## 2025-03-03 RX ADMIN — WATER 125 MG: 1 INJECTION INTRAMUSCULAR; INTRAVENOUS; SUBCUTANEOUS at 19:41

## 2025-03-03 RX ADMIN — CEFEPIME 2000 MG: 2 INJECTION, POWDER, FOR SOLUTION INTRAVENOUS at 19:42

## 2025-03-03 RX ADMIN — IPRATROPIUM BROMIDE AND ALBUTEROL SULFATE 3 ML: .5; 2.5 SOLUTION RESPIRATORY (INHALATION) at 19:33

## 2025-03-03 NOTE — ED NOTES
MEDICAL SCREENING:    Reason for Visit: Worsening shortness of breath on continuous supplemental oxygen at baseline 3 L nasal cannula    Patient initially seen in triage.  The patient was advised further evaluation and diagnostic testing will be needed, some of the treatment and testing will be initiated in the lobby in order to begin the process.  The patient will be returned to the waiting area for the time being and possibly be re-assessed by a subsequent ED provider.  The patient will be brought back to the treatment area in as timely manner as possible.       Elizabeth Montez DO  03/03/25 154

## 2025-03-03 NOTE — ED PROVIDER NOTES
Subjective   History of Present Illness    Patient reports increased shortness of breath and dyspnea on exertion for the past week. Patient is on 3 L/min of oxygen at home all the time  Patient has a dry cough and shortness of breath but otherwise denies any other significant cardiorespiratory or URTI symptoms on review    The patient is normally on 2 to 3 L of oxygen per minute at home      Review of Systems   Constitutional:  Positive for activity change and fatigue. Negative for chills, diaphoresis and fever.   HENT:  Positive for sore throat. Negative for congestion, ear pain, rhinorrhea and sinus pressure.    Respiratory:  Positive for cough and shortness of breath. Negative for wheezing and stridor.    Cardiovascular:  Negative for chest pain and palpitations.   Gastrointestinal: Negative.    Genitourinary: Negative.    Skin: Negative.        Past Medical History:   Diagnosis Date    Arthritis     CHF (congestive heart failure)     Chronic anticoagulation     Eliquis    Chronic kidney disease     stage II/IIIa    Collapsed lung     COPD (chronic obstructive pulmonary disease)     Diabetes mellitus     TYPE 2    Elevated cholesterol     GERD (gastroesophageal reflux disease)     Hyperlipidemia     Hypertension     Non-small cell lung cancer RUL     Sleep apnea     non compliant with CPAP    Stroke 2019    Wears eyeglasses        Allergies   Allergen Reactions    Paclitaxel Anaphylaxis       Past Surgical History:   Procedure Laterality Date    BRONCHOSCOPY Bilateral 02/27/2023    Procedure: BRONCHOSCOPY WITH ENDOBRONCHIAL ULTRASOUND;  Surgeon: Abdulkadir Peter MD;  Location:  [facility];  Service: Pulmonary;  Laterality: Bilateral;    BRONCHOSCOPY WITH ION ROBOTIC ASSIST N/A 9/6/2023    Procedure: BRONCHOSCOPY WITH ION ROBOT AND EBUS;  Surgeon: John Clemens MD;  Location:  [facility];  Service: Robotics - Pulmonary;  Laterality: N/A;  Ion cath #6 - 0032,  #6 - 0030, cath guide # 0077. EBUS scope  removed with balloon intact.    CARDIAC CATHETERIZATION N/A 08/22/2017    Procedure: Left Heart Cath;  Surgeon: Jatinder Matias MD;  Location:  [facility];  Service:     CARDIAC CATHETERIZATION N/A 11/22/2021    Procedure: Left Heart Cath;  Surgeon: Tolu Steinberg MD;  Location:  [facility];  Service: Cardiology;  Laterality: N/A;    COLONOSCOPY      ENDOSCOPY      GALLBLADDER SURGERY      PORTACATH PLACEMENT N/A 9/29/2023    Procedure: INSERTION OF PORTACATH;  Surgeon: Petr Velásquez MD;  Location:  [facility];  Service: General;  Laterality: N/A;    VENTRAL HERNIA REPAIR N/A 05/14/2020    Procedure: VENTRAL HERNIA REPAIR LAPAROSCOPIC WITH DAVINCI ROBOT;  Surgeon: Petr Velásquez MD;  Location:  [facility];  Service: Da[place]inci;  Laterality: N/A;       Family History   Problem Relation Age of Onset    Heart disease Mother         Rhianna sophia    Heart disease Father     Heart attack Father     Heart failure Father        Social History     Socioeconomic History    Marital status: Single    Number of children: 6   Tobacco Use    Smoking status: Former     Current packs/day: 0.00     Average packs/day: 3.0 packs/day for 16.7 years (50.2 ttl pk-yrs)     Types: Cigarettes     Start date: 4/3/1998     Quit date: 1/1/2015     Years since quitting: 10.1     Passive exposure: Past    Smokeless tobacco: Never   Vaping Use    Vaping status: Never Used   Substance and Sexual Activity    Alcohol use: No    Drug use: No    Sexual activity: Never           Objective   Physical Exam  Vitals and nursing note reviewed.   Constitutional:       General: She is not in acute distress.     Appearance: She is obese. She is ill-appearing. She is not toxic-appearing or diaphoretic.   HENT:      Head: Normocephalic and atraumatic.      Nose: Nose normal. No congestion or rhinorrhea.      Mouth/Throat:      Mouth: Mucous membranes are dry.      Pharynx: Oropharynx is clear. No oropharyngeal exudate  or posterior oropharyngeal erythema.   Eyes:      General:         Right eye: No discharge.         Left eye: No discharge.      Extraocular Movements: Extraocular movements intact.      Conjunctiva/sclera: Conjunctivae normal.      Pupils: Pupils are equal, round, and reactive to light.   Cardiovascular:      Rate and Rhythm: Normal rate and regular rhythm.      Heart sounds: Normal heart sounds. No murmur heard.     No friction rub. No gallop.   Pulmonary:      Effort: Tachypnea, accessory muscle usage and prolonged expiration present. No respiratory distress or retractions.      Breath sounds: Decreased air movement present. No stridor or transmitted upper airway sounds. Wheezing and rhonchi present. No rales.   Skin:     Coloration: Skin is not jaundiced.      Findings: No bruising, erythema or rash.   Neurological:      General: No focal deficit present.      Mental Status: She is alert and oriented to person, place, and time. Mental status is at baseline.      Cranial Nerves: No cranial nerve deficit.   Psychiatric:         Behavior: Behavior normal.         Thought Content: Thought content normal.         Judgment: Judgment normal.         Procedures           ED Course  ED Course as of 03/03/25 1942   Mon Mar 03, 2025   1620 Discussed with respiratory therapy, patient was turned down from 3 L to 2 L in order to fix slight hypercapnia PCO2 58.8  Electronically signed by Elizabeth Montez DO, 03/03/25, 4:21 PM EST.   [LK]   1847 Influenza A H1 2009 PCR(!): Detected [RA]   1847 Creatinine(!): 1.24 [RA]   1847 Glucose(!): 157 [RA]   1847 HS Troponin T(!): 15 [RA]   1847 Troponin T Numeric Delta: 0 [RA]   1847 Troponin T % Delta: 0 [RA]   1847 pH, Arterial(!): 7.291 [RA]   1847 pCO2, Arterial(!): 58.8 [RA]   1847 pO2, Arterial(!): 73.5 [RA]   1847 O2 Saturation, Arterial: 94.1 [RA]   1912 Influenza A H1 2009 PCR(!): Detected [RA]   1940 Dr. Interiano is kindly accepted the patient for admission on the hospitalist service  [RA]      ED Course User Index  [LK] Elizabeth Montez DO  [RA] Robert Mcgee MD                                                       Medical Decision Making  Problems Addressed:  Pneumonia of left lower lobe due to infectious organism: complicated acute illness or injury  Pneumonia of right upper lobe due to infectious organism: complicated acute illness or injury  Respiratory acidosis: complicated acute illness or injury    Amount and/or Complexity of Data Reviewed  Labs: ordered. Decision-making details documented in ED Course.  Radiology: ordered.  ECG/medicine tests: ordered.    Risk  Prescription drug management.  Decision regarding hospitalization.        Final diagnoses:   Pneumonia of right upper lobe due to infectious organism   Pneumonia of left lower lobe due to infectious organism   Respiratory acidosis       ED Disposition  ED Disposition       ED Disposition   Decision to Admit    Condition   --    Comment   --               No follow-up provider specified.       Medication List      No changes were made to your prescriptions during this visit.            Robert Mcgee MD  03/03/25 1942

## 2025-03-04 ENCOUNTER — APPOINTMENT (OUTPATIENT)
Dept: GENERAL RADIOLOGY | Facility: HOSPITAL | Age: 80
DRG: 193 | End: 2025-03-04
Payer: MEDICARE

## 2025-03-04 LAB
A-A DO2: 47 MMHG (ref 0–300)
A-A DO2: 54 MMHG (ref 0–300)
A-A DO2: 58.1 MMHG (ref 0–300)
A-A DO2: 76.8 MMHG (ref 0–300)
ALBUMIN SERPL-MCNC: 3.7 G/DL (ref 3.5–5.2)
ALBUMIN/GLOB SERPL: 0.9 G/DL
ALP SERPL-CCNC: 91 U/L (ref 39–117)
ALT SERPL W P-5'-P-CCNC: 14 U/L (ref 1–33)
ANION GAP SERPL CALCULATED.3IONS-SCNC: 10.6 MMOL/L (ref 5–15)
ANISOCYTOSIS BLD QL: ABNORMAL
ARTERIAL PATENCY WRIST A: POSITIVE
AST SERPL-CCNC: 16 U/L (ref 1–32)
ATMOSPHERIC PRESS: 722 MMHG
ATMOSPHERIC PRESS: 726 MMHG
ATMOSPHERIC PRESS: 726 MMHG
ATMOSPHERIC PRESS: 727 MMHG
BASE EXCESS BLDA CALC-SCNC: -0.6 MMOL/L (ref 0–2)
BASE EXCESS BLDA CALC-SCNC: -0.6 MMOL/L (ref 0–2)
BASE EXCESS BLDA CALC-SCNC: 0.3 MMOL/L (ref 0–2)
BASE EXCESS BLDA CALC-SCNC: 1.1 MMOL/L (ref 0–2)
BDY SITE: ABNORMAL
BILIRUB SERPL-MCNC: 0.3 MG/DL (ref 0–1.2)
BUN SERPL-MCNC: 22 MG/DL (ref 8–23)
BUN/CREAT SERPL: 20.6 (ref 7–25)
CALCIUM SPEC-SCNC: 9 MG/DL (ref 8.6–10.5)
CHLORIDE SERPL-SCNC: 100 MMOL/L (ref 98–107)
CO2 BLDA-SCNC: 28.8 MMOL/L (ref 22–33)
CO2 BLDA-SCNC: 29 MMOL/L (ref 22–33)
CO2 BLDA-SCNC: 29.1 MMOL/L (ref 22–33)
CO2 BLDA-SCNC: 30.5 MMOL/L (ref 22–33)
CO2 SERPL-SCNC: 24.4 MMOL/L (ref 22–29)
COHGB MFR BLD: 1.5 % (ref 0–5)
COHGB MFR BLD: 1.6 % (ref 0–5)
CREAT SERPL-MCNC: 1.07 MG/DL (ref 0.57–1)
DACRYOCYTES BLD QL SMEAR: ABNORMAL
DEPRECATED RDW RBC AUTO: 42.4 FL (ref 37–54)
EGFRCR SERPLBLD CKD-EPI 2021: 53.3 ML/MIN/1.73
ELLIPTOCYTES BLD QL SMEAR: ABNORMAL
EPAP: 6
ERYTHROCYTE [DISTWIDTH] IN BLOOD BY AUTOMATED COUNT: 13.2 % (ref 12.3–15.4)
GAS FLOW AIRWAY: 3 LPM
GLOBULIN UR ELPH-MCNC: 4 GM/DL
GLUCOSE BLDC GLUCOMTR-MCNC: 165 MG/DL (ref 70–130)
GLUCOSE BLDC GLUCOMTR-MCNC: 186 MG/DL (ref 70–130)
GLUCOSE BLDC GLUCOMTR-MCNC: 200 MG/DL (ref 70–130)
GLUCOSE BLDC GLUCOMTR-MCNC: 203 MG/DL (ref 70–130)
GLUCOSE BLDC GLUCOMTR-MCNC: 209 MG/DL (ref 70–130)
GLUCOSE BLDC GLUCOMTR-MCNC: 214 MG/DL (ref 70–130)
GLUCOSE SERPL-MCNC: 223 MG/DL (ref 65–99)
HCO3 BLDA-SCNC: 27.1 MMOL/L (ref 20–26)
HCO3 BLDA-SCNC: 27.3 MMOL/L (ref 20–26)
HCO3 BLDA-SCNC: 27.3 MMOL/L (ref 20–26)
HCO3 BLDA-SCNC: 28.7 MMOL/L (ref 20–26)
HCT VFR BLD AUTO: 38.8 % (ref 34–46.6)
HCT VFR BLD CALC: 35 % (ref 38–51)
HCT VFR BLD CALC: 35.2 % (ref 38–51)
HCT VFR BLD CALC: 35.7 % (ref 38–51)
HCT VFR BLD CALC: 36.3 % (ref 38–51)
HGB BLD-MCNC: 11.8 G/DL (ref 12–15.9)
HGB BLDA-MCNC: 11.4 G/DL (ref 13.5–17.5)
HGB BLDA-MCNC: 11.5 G/DL (ref 13.5–17.5)
HGB BLDA-MCNC: 11.6 G/DL (ref 13.5–17.5)
HGB BLDA-MCNC: 11.8 G/DL (ref 13.5–17.5)
HYPOCHROMIA BLD QL: ABNORMAL
INHALED O2 CONCENTRATION: 28 %
INHALED O2 CONCENTRATION: 32 %
IPAP: 24
L PNEUMO1 AG UR QL IA: NEGATIVE
LARGE PLATELETS: ABNORMAL
LYMPHOCYTES # BLD MANUAL: 0.67 10*3/MM3 (ref 0.7–3.1)
LYMPHOCYTES NFR BLD MANUAL: 6 % (ref 5–12)
Lab: ABNORMAL
MCH RBC QN AUTO: 26.8 PG (ref 26.6–33)
MCHC RBC AUTO-ENTMCNC: 30.4 G/DL (ref 31.5–35.7)
MCV RBC AUTO: 88 FL (ref 79–97)
METHGB BLD QL: 0.3 % (ref 0–3)
METHGB BLD QL: 0.3 % (ref 0–3)
METHGB BLD QL: 0.4 % (ref 0–3)
METHGB BLD QL: 0.5 % (ref 0–3)
MODALITY: ABNORMAL
MONOCYTES # BLD: 0.29 10*3/MM3 (ref 0.1–0.9)
MRSA DNA SPEC QL NAA+PROBE: NORMAL
NEUTROPHILS # BLD AUTO: 3.82 10*3/MM3 (ref 1.7–7)
NEUTROPHILS NFR BLD MANUAL: 77 % (ref 42.7–76)
NEUTS BAND NFR BLD MANUAL: 3 % (ref 0–5)
NOTE: ABNORMAL
NOTIFIED BY: ABNORMAL
NOTIFIED WHO: ABNORMAL
OXYHGB MFR BLDV: 90.4 % (ref 94–99)
OXYHGB MFR BLDV: 92.8 % (ref 94–99)
OXYHGB MFR BLDV: 93 % (ref 94–99)
OXYHGB MFR BLDV: 93.3 % (ref 94–99)
PCO2 BLDA: 54.6 MM HG (ref 35–45)
PCO2 BLDA: 57.6 MM HG (ref 35–45)
PCO2 BLDA: 59.1 MM HG (ref 35–45)
PCO2 BLDA: 59.6 MM HG (ref 35–45)
PCO2 TEMP ADJ BLD: ABNORMAL MM[HG]
PH BLDA: 7.27 PH UNITS (ref 7.35–7.45)
PH BLDA: 7.28 PH UNITS (ref 7.35–7.45)
PH BLDA: 7.29 PH UNITS (ref 7.35–7.45)
PH BLDA: 7.31 PH UNITS (ref 7.35–7.45)
PH, TEMP CORRECTED: ABNORMAL
PLATELET # BLD AUTO: 151 10*3/MM3 (ref 140–450)
PMV BLD AUTO: 11.7 FL (ref 6–12)
PO2 BLDA: 65.8 MM HG (ref 83–108)
PO2 BLDA: 74.1 MM HG (ref 83–108)
PO2 BLDA: 76.5 MM HG (ref 83–108)
PO2 BLDA: 76.7 MM HG (ref 83–108)
PO2 TEMP ADJ BLD: ABNORMAL MM[HG]
POTASSIUM SERPL-SCNC: 4.5 MMOL/L (ref 3.5–5.2)
PROCALCITONIN SERPL-MCNC: 0.11 NG/ML (ref 0–0.25)
PROT SERPL-MCNC: 7.7 G/DL (ref 6–8.5)
RBC # BLD AUTO: 4.41 10*6/MM3 (ref 3.77–5.28)
S PNEUM AG SPEC QL LA: NEGATIVE
SAO2 % BLDCOA: 92.2 % (ref 94–99)
SAO2 % BLDCOA: 94.7 % (ref 94–99)
SAO2 % BLDCOA: 94.8 % (ref 94–99)
SAO2 % BLDCOA: 95.1 % (ref 94–99)
SET MECH RESP RATE: 24
SET MECH RESP RATE: 28
SET MECH RESP RATE: 28
SODIUM SERPL-SCNC: 135 MMOL/L (ref 136–145)
VARIANT LYMPHS NFR BLD MANUAL: 14 % (ref 19.6–45.3)
VENTILATOR MODE: ABNORMAL
WBC NRBC COR # BLD AUTO: 4.77 10*3/MM3 (ref 3.4–10.8)

## 2025-03-04 PROCEDURE — 82948 REAGENT STRIP/BLOOD GLUCOSE: CPT

## 2025-03-04 PROCEDURE — 94799 UNLISTED PULMONARY SVC/PX: CPT

## 2025-03-04 PROCEDURE — 87449 NOS EACH ORGANISM AG IA: CPT | Performed by: INTERNAL MEDICINE

## 2025-03-04 PROCEDURE — 87205 SMEAR GRAM STAIN: CPT | Performed by: INTERNAL MEDICINE

## 2025-03-04 PROCEDURE — 74018 RADEX ABDOMEN 1 VIEW: CPT | Performed by: RADIOLOGY

## 2025-03-04 PROCEDURE — 25010000002 HEPARIN (PORCINE) PER 1000 UNITS: Performed by: INTERNAL MEDICINE

## 2025-03-04 PROCEDURE — 84145 PROCALCITONIN (PCT): CPT | Performed by: INTERNAL MEDICINE

## 2025-03-04 PROCEDURE — 80053 COMPREHEN METABOLIC PANEL: CPT | Performed by: INTERNAL MEDICINE

## 2025-03-04 PROCEDURE — 99232 SBSQ HOSP IP/OBS MODERATE 35: CPT | Performed by: INTERNAL MEDICINE

## 2025-03-04 PROCEDURE — 83050 HGB METHEMOGLOBIN QUAN: CPT

## 2025-03-04 PROCEDURE — 36600 WITHDRAWAL OF ARTERIAL BLOOD: CPT

## 2025-03-04 PROCEDURE — 94664 DEMO&/EVAL PT USE INHALER: CPT

## 2025-03-04 PROCEDURE — 82805 BLOOD GASES W/O2 SATURATION: CPT

## 2025-03-04 PROCEDURE — 74018 RADEX ABDOMEN 1 VIEW: CPT

## 2025-03-04 PROCEDURE — 85025 COMPLETE CBC W/AUTO DIFF WBC: CPT | Performed by: INTERNAL MEDICINE

## 2025-03-04 PROCEDURE — 87641 MR-STAPH DNA AMP PROBE: CPT | Performed by: INTERNAL MEDICINE

## 2025-03-04 PROCEDURE — 85007 BL SMEAR W/DIFF WBC COUNT: CPT | Performed by: INTERNAL MEDICINE

## 2025-03-04 PROCEDURE — 87070 CULTURE OTHR SPECIMN AEROBIC: CPT | Performed by: INTERNAL MEDICINE

## 2025-03-04 PROCEDURE — 87899 AGENT NOS ASSAY W/OPTIC: CPT | Performed by: INTERNAL MEDICINE

## 2025-03-04 PROCEDURE — 63710000001 INSULIN LISPRO (HUMAN) PER 5 UNITS: Performed by: INTERNAL MEDICINE

## 2025-03-04 PROCEDURE — 94761 N-INVAS EAR/PLS OXIMETRY MLT: CPT

## 2025-03-04 PROCEDURE — 25010000002 CEFEPIME PER 500 MG: Performed by: INTERNAL MEDICINE

## 2025-03-04 PROCEDURE — 25010000002 METHYLPREDNISOLONE PER 40 MG: Performed by: INTERNAL MEDICINE

## 2025-03-04 PROCEDURE — 82375 ASSAY CARBOXYHB QUANT: CPT

## 2025-03-04 PROCEDURE — 25010000002 VANCOMYCIN 2-0.9 GM/500ML-% SOLUTION: Performed by: INTERNAL MEDICINE

## 2025-03-04 RX ORDER — HYDROXYZINE HYDROCHLORIDE 25 MG/1
25 TABLET, FILM COATED ORAL NIGHTLY PRN
Status: DISCONTINUED | OUTPATIENT
Start: 2025-03-04 | End: 2025-03-07 | Stop reason: HOSPADM

## 2025-03-04 RX ORDER — IPRATROPIUM BROMIDE AND ALBUTEROL SULFATE 2.5; .5 MG/3ML; MG/3ML
3 SOLUTION RESPIRATORY (INHALATION)
Status: DISCONTINUED | OUTPATIENT
Start: 2025-03-04 | End: 2025-03-07 | Stop reason: HOSPADM

## 2025-03-04 RX ORDER — INSULIN LISPRO 100 [IU]/ML
2-9 INJECTION, SOLUTION INTRAVENOUS; SUBCUTANEOUS
Status: DISCONTINUED | OUTPATIENT
Start: 2025-03-04 | End: 2025-03-07 | Stop reason: HOSPADM

## 2025-03-04 RX ORDER — IPRATROPIUM BROMIDE AND ALBUTEROL SULFATE 2.5; .5 MG/3ML; MG/3ML
3 SOLUTION RESPIRATORY (INHALATION) EVERY 4 HOURS PRN
COMMUNITY

## 2025-03-04 RX ORDER — ATORVASTATIN CALCIUM 20 MG/1
20 TABLET, FILM COATED ORAL DAILY
Status: DISCONTINUED | OUTPATIENT
Start: 2025-03-04 | End: 2025-03-07 | Stop reason: HOSPADM

## 2025-03-04 RX ORDER — BUDESONIDE AND FORMOTEROL FUMARATE DIHYDRATE 160; 4.5 UG/1; UG/1
2 AEROSOL RESPIRATORY (INHALATION)
Status: CANCELLED | OUTPATIENT
Start: 2025-03-04

## 2025-03-04 RX ORDER — NYSTATIN 100000 [USP'U]/G
1 POWDER TOPICAL 4 TIMES DAILY
Status: DISCONTINUED | OUTPATIENT
Start: 2025-03-04 | End: 2025-03-07 | Stop reason: HOSPADM

## 2025-03-04 RX ORDER — HYDROXYZINE HYDROCHLORIDE 25 MG/1
25 TABLET, FILM COATED ORAL NIGHTLY
Status: DISCONTINUED | OUTPATIENT
Start: 2025-03-04 | End: 2025-03-07 | Stop reason: HOSPADM

## 2025-03-04 RX ORDER — PANTOPRAZOLE SODIUM 40 MG/1
40 TABLET, DELAYED RELEASE ORAL
Status: DISCONTINUED | OUTPATIENT
Start: 2025-03-05 | End: 2025-03-07 | Stop reason: HOSPADM

## 2025-03-04 RX ORDER — BUDESONIDE 0.5 MG/2ML
0.5 INHALANT ORAL
Status: DISCONTINUED | OUTPATIENT
Start: 2025-03-04 | End: 2025-03-07 | Stop reason: HOSPADM

## 2025-03-04 RX ORDER — ALBUTEROL SULFATE 0.83 MG/ML
2.5 SOLUTION RESPIRATORY (INHALATION) EVERY 4 HOURS PRN
Status: CANCELLED | OUTPATIENT
Start: 2025-03-04

## 2025-03-04 RX ORDER — LISINOPRIL 10 MG/1
10 TABLET ORAL DAILY
Status: DISCONTINUED | OUTPATIENT
Start: 2025-03-04 | End: 2025-03-07 | Stop reason: HOSPADM

## 2025-03-04 RX ADMIN — INSULIN LISPRO 2 UNITS: 100 INJECTION, SOLUTION INTRAVENOUS; SUBCUTANEOUS at 17:38

## 2025-03-04 RX ADMIN — WATER 40 MG: 1 INJECTION INTRAMUSCULAR; INTRAVENOUS; SUBCUTANEOUS at 20:57

## 2025-03-04 RX ADMIN — CEFEPIME 2000 MG: 2 INJECTION, POWDER, FOR SOLUTION INTRAVENOUS at 07:19

## 2025-03-04 RX ADMIN — Medication 2000 MG: at 00:57

## 2025-03-04 RX ADMIN — ATORVASTATIN CALCIUM 20 MG: 20 TABLET, FILM COATED ORAL at 18:44

## 2025-03-04 RX ADMIN — INSULIN LISPRO 4 UNITS: 100 INJECTION, SOLUTION INTRAVENOUS; SUBCUTANEOUS at 12:49

## 2025-03-04 RX ADMIN — HEPARIN SODIUM 5000 UNITS: 5000 INJECTION INTRAVENOUS; SUBCUTANEOUS at 00:57

## 2025-03-04 RX ADMIN — DOXYCYCLINE 100 MG: 100 INJECTION, POWDER, LYOPHILIZED, FOR SOLUTION INTRAVENOUS at 01:58

## 2025-03-04 RX ADMIN — BUDESONIDE 0.5 MG: 0.5 INHALANT RESPIRATORY (INHALATION) at 21:11

## 2025-03-04 RX ADMIN — INSULIN LISPRO 2 UNITS: 100 INJECTION, SOLUTION INTRAVENOUS; SUBCUTANEOUS at 20:57

## 2025-03-04 RX ADMIN — HYDROXYZINE HYDROCHLORIDE 25 MG: 25 TABLET, FILM COATED ORAL at 20:57

## 2025-03-04 RX ADMIN — NYSTATIN 1 APPLICATION: 100000 POWDER TOPICAL at 20:58

## 2025-03-04 RX ADMIN — LISINOPRIL 10 MG: 10 TABLET ORAL at 18:44

## 2025-03-04 RX ADMIN — INSULIN LISPRO 4 UNITS: 100 INJECTION, SOLUTION INTRAVENOUS; SUBCUTANEOUS at 08:25

## 2025-03-04 RX ADMIN — IPRATROPIUM BROMIDE AND ALBUTEROL SULFATE 3 ML: 2.5; .5 SOLUTION RESPIRATORY (INHALATION) at 18:38

## 2025-03-04 RX ADMIN — APIXABAN 5 MG: 5 TABLET, FILM COATED ORAL at 20:58

## 2025-03-04 RX ADMIN — IPRATROPIUM BROMIDE AND ALBUTEROL SULFATE 3 ML: 2.5; .5 SOLUTION RESPIRATORY (INHALATION) at 13:17

## 2025-03-04 RX ADMIN — Medication 1000 MG: at 18:44

## 2025-03-04 RX ADMIN — DOXYCYCLINE 100 MG: 100 INJECTION, POWDER, LYOPHILIZED, FOR SOLUTION INTRAVENOUS at 12:49

## 2025-03-04 RX ADMIN — OSELTAMIVIR PHOSPHATE 30 MG: 30 CAPSULE ORAL at 20:58

## 2025-03-04 RX ADMIN — CEFEPIME 2000 MG: 2 INJECTION, POWDER, FOR SOLUTION INTRAVENOUS at 20:57

## 2025-03-04 RX ADMIN — Medication 10 ML: at 20:58

## 2025-03-04 RX ADMIN — WATER 40 MG: 1 INJECTION INTRAMUSCULAR; INTRAVENOUS; SUBCUTANEOUS at 07:20

## 2025-03-04 RX ADMIN — OSELTAMIVIR PHOSPHATE 30 MG: 30 CAPSULE ORAL at 08:25

## 2025-03-04 RX ADMIN — SENNOSIDES AND DOCUSATE SODIUM 2 TABLET: 50; 8.6 TABLET ORAL at 20:57

## 2025-03-04 RX ADMIN — Medication 10 ML: at 08:25

## 2025-03-04 RX ADMIN — HEPARIN SODIUM 5000 UNITS: 5000 INJECTION INTRAVENOUS; SUBCUTANEOUS at 08:25

## 2025-03-04 RX ADMIN — Medication 10 ML: at 00:58

## 2025-03-04 NOTE — PLAN OF CARE
Goal Outcome Evaluation:  Plan of Care Reviewed With: patient           Outcome Evaluation: Pt resting in bed. IVs patent, no redness noted to site. Bipap in place. Pt passed dysphagia screening. Pt voices no complaints at this time. Will continue to follow plan of care.

## 2025-03-04 NOTE — H&P
"    [facility]  History & Physical    Patient Identification:  Name:  Mayra Hays  Age:  79 y.o.  Sex:  female  :  1945  MRN:  [id number]   Visit Number:  [id number]  Admit Date: 3/3/2025   Primary Care Physician:  Niki Antony APRN    Subjective       Chief Complaint   Patient presents with    Shortness of Breath       History of presenting illness:    Mayra Hays is a 79 y.o. female who presented for further evaluation of short of breath and dyspnea on exertion for 1 week    Past medical history is significant for COPD, history of collapsed lung, history of non-small cell lung cancer in the right upper lobe, sleep apnea-noncompliant with CPAP, HFpEF, HTN, HLD, atrial fibrillation-chronically anticoagulated with Eliquis.    This patient was seen while in the ER currently on BiPAP, in no acute distress.  This patient reported to the ER today after feeling especially short of breath and dyspnea on exertion.  Patient states that she was short of breath at baseline even during her baseline oxygen supplementation.  Patient is on 2-3 L/min of oxygen at home at her baseline due to COPD.  The patient arrived on 3 L/min stating that it was not relieving her symptoms.  Patient says she feels similar to how she has felt when she has had pneumonia in the past.  Patient complains of a sore throat, cough, short of breath, fatigue, and unable to maintain her regular activity level.    ED, vital signs were /67 (BP Location: Right arm, Patient Position: Sitting)   Pulse 77   Temp 97.7 °F (36.5 °C) (Temporal)   Resp 20   Ht 177.8 cm (70\")   Wt 109 kg (240 lb)   SpO2 91%   BMI 34.44 kg/m²   ED workup significant for:   Respiratory panel positive for flu A  Chest xray significant for right upper lobe consolidation and left basilar airspace disease.  ABG: Initially pH 7.291, pCO2 58.8, pO2 75, bicarb 23, arterial O2 sat 94.1, oxyhemoglobin 92.8 on 3 L nasal " cannula.  ABG: follow-up pH 7.299, pCO2 54.2, pO2 63.9, bicarb 26.6, arterial O2 sat 91.7, oxyhemoglobin 90.4, on BiPAP 24/6  HS troponin initially 15, repeat 15, potassium 4.2, CO2 31.4, creatinine 1.24 from baseline of 1.13, WBC 4.53, lactate 0.7  EKG shows sinus rhythm with PACs.  Left axis deviation.  Anterior infarct, age undetermined.  QTc 456.    Known Emergency Department medications received prior to my evaluation included:  Medications   sodium chloride 0.9 % flush 10 mL (has no administration in time range)   cefepime 2000 mg IVPB in 100 mL NS (VTB) (0 mg Intravenous Stopped 3/3/25 2022)   methylPREDNISolone sodium succinate (SOLU-Medrol) 125 mg in sterile water (preservative free) 2 mL (125 mg Intravenous Given 3/3/25 1941)   ipratropium-albuterol (DUO-NEB) nebulizer solution 3 mL (3 mL Nebulization Given 3/3/25 1933)       Room location at the time of my evaluation was .     ---------------------------------------------------------------------------------------------------------------------   Review of Systems   Constitutional:  Positive for activity change and fatigue. Negative for chills and fever.   HENT:  Positive for sore throat. Negative for congestion, postnasal drip, rhinorrhea, sinus pressure and sneezing.    Eyes:  Negative for discharge and redness.   Respiratory:  Positive for cough, shortness of breath and wheezing.    Cardiovascular:  Negative for chest pain, palpitations and leg swelling.   Gastrointestinal:  Negative for diarrhea, nausea and vomiting.   Genitourinary:  Negative for decreased urine volume, dysuria and hematuria.   Musculoskeletal:  Negative for arthralgias and joint swelling.   Skin:  Negative for rash and wound.   Neurological:  Positive for weakness. Negative for dizziness, speech difficulty, light-headedness and headaches.   Hematological:  Does not bruise/bleed easily.   Psychiatric/Behavioral:  Negative for confusion, self-injury and suicidal ideas. The patient  is not nervous/anxious.       ---------------------------------------------------------------------------------------------------------------------   Past Medical History:   Diagnosis Date    Arthritis     CHF (congestive heart failure)     Chronic anticoagulation     Eliquis    Chronic kidney disease     stage II/IIIa    Collapsed lung     COPD (chronic obstructive pulmonary disease)     Diabetes mellitus     TYPE 2    Elevated cholesterol     GERD (gastroesophageal reflux disease)     Hyperlipidemia     Hypertension     Non-small cell lung cancer RUL     Sleep apnea     non compliant with CPAP    Stroke 2019    Wears eyeglasses      Past Surgical History:   Procedure Laterality Date    BRONCHOSCOPY Bilateral 02/27/2023    Procedure: BRONCHOSCOPY WITH ENDOBRONCHIAL ULTRASOUND;  Surgeon: Abdulkadir Peter MD;  Location: [facility];  Service: Pulmonary;  Laterality: Bilateral;    BRONCHOSCOPY WITH ION ROBOTIC ASSIST N/A 9/6/2023    Procedure: BRONCHOSCOPY WITH ION ROBOT AND EBUS;  Surgeon: John Clemens MD;  Location:  [facility];  Service: Robotics - Pulmonary;  Laterality: N/A;  Ion cath #6 - 0032,  #6 - 0030, cath guide # 0077. EBUS scope removed with balloon intact.    CARDIAC CATHETERIZATION N/A 08/22/2017    Procedure: Left Heart Cath;  Surgeon: Jatinder Matias MD;  Location: [facility] CATH INVASIVE LOCATION;  Service:     CARDIAC CATHETERIZATION N/A 11/22/2021    Procedure: Left Heart Cath;  Surgeon: Tolu Steinberg MD;  Location: [facility] CATH INVASIVE LOCATION;  Service: Cardiology;  Laterality: N/A;    COLONOSCOPY      ENDOSCOPY      GALLBLADDER SURGERY      PORTACATH PLACEMENT N/A 9/29/2023    Procedure: INSERTION OF PORTACATH;  Surgeon: Petr Velásquez MD;  Location: [facility] OR;  Service: General;  Laterality: N/A;    VENTRAL HERNIA REPAIR N/A 05/14/2020    Procedure: VENTRAL HERNIA REPAIR LAPAROSCOPIC WITH DAVINCI ROBOT;  Surgeon: Petr Velásquez MD;  Location: [facility];  Service:  Naveed;  Laterality: N/A;     Family History   Problem Relation Age of Onset    Heart disease Mother         Rhianna sophia    Heart disease Father     Heart attack Father     Heart failure Father      Social History     Socioeconomic History    Marital status: Single    Number of children: 6   Tobacco Use    Smoking status: Former     Current packs/day: 0.00     Average packs/day: 3.0 packs/day for 16.7 years (50.2 ttl pk-yrs)     Types: Cigarettes     Start date: 4/3/1998     Quit date: 1/1/2015     Years since quitting: 10.1     Passive exposure: Past    Smokeless tobacco: Never   Vaping Use    Vaping status: Never Used   Substance and Sexual Activity    Alcohol use: No    Drug use: No    Sexual activity: Never     ---------------------------------------------------------------------------------------------------------------------   Allergies:  Paclitaxel  ---------------------------------------------------------------------------------------------------------------------   Home medications:  Medications below are reported home medications pulling from within the system; at this time, these medications have not been reconciled unless otherwise specified and are in the verification process for further verifcation as current home medications.  (Not in a hospital admission)      Hospital Scheduled Meds:          Current listed hospital scheduled medications may not yet reflect those currently placed in orders that are signed and held awaiting patient's arrival to floor.   ---------------------------------------------------------------------------------------------------------------------     Objective     Vital Signs:  Temp:  [97.7 °F (36.5 °C)-98.5 °F (36.9 °C)] 97.7 °F (36.5 °C)  Heart Rate:  [77-88] 77  Resp:  [20-24] 20  BP: (123-129)/(56-67) 123/67      03/03/25  1446   Weight: 109 kg (240 lb)     Body mass index is 34.44  kg/m².  ---------------------------------------------------------------------------------------------------------------------     Physical Exam  Vitals reviewed.   Constitutional:       General: She is not in acute distress.     Appearance: Normal appearance.   HENT:      Head: Normocephalic and atraumatic.      Nose: Nose normal.      Mouth/Throat:      Mouth: Mucous membranes are moist.   Eyes:      Conjunctiva/sclera: Conjunctivae normal.      Pupils: Pupils are equal, round, and reactive to light.   Cardiovascular:      Rate and Rhythm: Normal rate and regular rhythm.      Pulses: Normal pulses.      Heart sounds: Normal heart sounds. No murmur heard.  Pulmonary:      Effort: Pulmonary effort is normal. No respiratory distress.      Breath sounds: Wheezing and rales present.   Abdominal:      General: There is no distension.      Palpations: Abdomen is soft.      Tenderness: There is no abdominal tenderness.   Musculoskeletal:         General: No deformity.      Right lower leg: Edema present.      Left lower leg: Edema present.   Skin:     General: Skin is warm and dry.      Findings: No erythema, lesion or rash.   Neurological:      Mental Status: She is alert and oriented to person, place, and time. Mental status is at baseline.   Psychiatric:         Mood and Affect: Mood normal.         Behavior: Behavior normal.       ---------------------------------------------------------------------------------------------------------------------  EKG:        I have personally looked at the EKG.  ---------------------------------------------------------------------------------------------------------------------   Results from last 7 days   Lab Units 03/03/25 1920 03/03/25  1615   LACTATE mmol/L 0.7  --    WBC 10*3/mm3  --  4.53   HEMOGLOBIN g/dL  --  12.1   HEMATOCRIT %  --  39.7   MCV fL  --  87.4   MCHC g/dL  --  30.5*   PLATELETS 10*3/mm3  --  152   INR   --  1.12*     Results from last 7 days   Lab Units  "03/03/25 2028 03/03/25  1614   PH, ARTERIAL pH units 7.299* 7.291*   PO2 ART mm Hg 63.9* 73.5*   PCO2, ARTERIAL mm Hg 54.2* 58.8*   HCO3 ART mmol/L 26.6* 28.3*     Results from last 7 days   Lab Units 03/03/25  1615   SODIUM mmol/L 136   POTASSIUM mmol/L 4.2   CHLORIDE mmol/L 99   CO2 mmol/L 31.4*   BUN mg/dL 19   CREATININE mg/dL 1.24*   CALCIUM mg/dL 9.0   GLUCOSE mg/dL 157*   ALBUMIN g/dL 4.3   BILIRUBIN mg/dL 0.3   ALK PHOS U/L 92   AST (SGOT) U/L 18   ALT (SGPT) U/L 14   Estimated Creatinine Clearance: 50 mL/min (A) (by C-G formula based on SCr of 1.24 mg/dL (H)).  No results found for: \"AMMONIA\"  Results from last 7 days   Lab Units 03/03/25  1730 03/03/25  1615   HSTROP T ng/L 15* 15*     Results from last 7 days   Lab Units 03/03/25  1615   PROBNP pg/mL 254.2     Lab Results   Component Value Date    HGBA1C 7.70 (H) 01/01/2024     Lab Results   Component Value Date    TSH 2.660 01/30/2025    FREET4 0.88 (L) 01/30/2025     No results found for: \"PREGTESTUR\", \"PREGSERUM\", \"HCG\", \"HCGQUANT\"  Pain Management Panel  More data exists         Latest Ref Rng & Units 1/2/2024 1/1/2024   Pain Management Panel   Creatinine, Urine mg/dL 152.1  -   Amphetamine, Urine Qual Negative - Negative    Barbiturates Screen, Urine Negative - Negative    Benzodiazepine Screen, Urine Negative - Negative    Buprenorphine, Screen, Urine Negative - Negative    Cocaine Screen, Urine Negative - Negative    Fentanyl, Urine Negative - Negative    Methadone Screen , Urine Negative - Negative    Methamphetamine, Ur Negative - Negative       Details                 No results found for: \"BLOODCX\"  No results found for: \"URINECX\"  No results found for: \"WOUNDCX\"  No results found for: \"STOOLCX\"      ---------------------------------------------------------------------------------------------------------------------  Imaging Results (Last 7 Days)       Procedure Component Value Units Date/Time    XR Chest 1 View [[id number]] Collected: 03/03/25 " "1656     Updated: 03/03/25 1659    Narrative:      XR CHEST 1 VW-     CLINICAL INDICATION: SOB        COMPARISON: 12/23/2024     TECHNIQUE: Single frontal view of the chest.     FINDINGS:     LUNGS: Left basilar airspace disease and right upper lobe consolidation     HEART AND MEDIASTINUM: Heart and mediastinal contours are unremarkable        SKELETON: Bony and soft tissue structures are unremarkable.             Impression:      Right upper lobe consolidation and left basilar airspace disease           This report was finalized on 3/3/2025 4:57 PM by Dr. Mauricio Santiago MD.               Cultures:  No results found for: \"BLOODCX\", \"URINECX\", \"WOUNDCX\", \"MRSACX\", \"RESPCX\", \"STOOLCX\"    Last echocardiogram:  Results for orders placed during the hospital encounter of 04/10/24    Adult Transthoracic Echo Complete w/ Color, Spectral and Contrast if necessary per protocol    Interpretation Summary    Left ventricular systolic function is normal. Calculated left ventricular EF = 52% Left ventricular ejection fraction appears to be 51 - 55%.    Left ventricular diastolic function was normal.    The left atrial cavity is moderately dilated.    Estimated right ventricular systolic pressure from tricuspid regurgitation is mildly elevated (35-45 mmHg).      I have personally reviewed the above radiology images and read the final radiology report on 03/03/25  ---------------------------------------------------------------------------------------------------------------------  Assessment / Plan     Active Hospital Problems    Diagnosis  POA    **Respiratory failure [J96.90]  Yes       ASSESSMENT/PLAN:  Acute hypercapnic respiratory failure on chronic hypoxic respiratory failure, POA  Acute RUL and LLL pneumonia, POA  Influenza A, POA  Acute COPD exacerbation  Patient does not meet sepsis criteria at this time  Chest xray significant for right upper lobe consolidation and left basilar airspace disease.  Patient is currently on " BiPAP continue supplemental oxygen via nasal cannula to maintain O2 > or equal to 90%.  Order urinary legionella, respiratory PCR, respiratory culture, MRSA screening by PCR. Check strep pneumo.   PRN nebs every 6 hours.   Antibiotic coverage with cefepime, doxycycline and vancomycin.  Patient had first dose of cefepime given in ED  ABG: Initially pH 7.291, pCO2 58.8, pO2 75, bicarb 23, arterial O2 sat 94.1, oxyhemoglobin 92.8 on 3 L nasal cannula.  ABG: follow-up pH 7.299, pCO2 54.2, pO2 63.9, bicarb 26.6, arterial O2 sat 91.7, oxyhemoglobin 90.4, on BiPAP 24/6  Scheduled Tamiflu, Tylenol every 8 hours  Encourage incentive spirometry   Type 2 diabetes, with hyperglycemia, without current insulin use  Hold any oral hypoglycemics to prevent hypoglycemia.  Will review home medications once available per pharmacy.  Hypoglycemia protocol in place if necessary.  Accu-Cheks before meals and at bedtime.  Essential hypertension  Plan to resume home antihypertensive regimen once med rec is completed by pharmacy.  Holding parameters to prevent hypotension and bradycardia.      Chronic Conditions  HFpEF  CKD stage IIIa  Hyperlipidemia  Sleep apnea  Review and continue appropriate home medications per med rec once completed by pharmacy.    ----------  -DVT prophylaxis: Subcu heparin  -Activity: Up with assistance  -Expected length of stay: INPATIENT status due to the need for care which can only be reasonably provided in an hospital setting such as aggressive/expedited ancillary services and/or consultation services, the necessity for IV medications, close physician monitoring and/or the possible need for procedures.  In such, I feel patient’s risk for adverse outcomes and need for care warrant INPATIENT evaluation and predict the patient’s care encounter to likely last beyond 2 midnights.  -Disposition: Pending clinical course     High risk secondary to acute hypoxic respiratory failure, right upper lobe and left lower lobe  pneumonia, influenza A    Code Status and Medical Interventions: CPR (Attempt to Resuscitate); Full Support   Ordered at: 03/03/25 2049     Code Status (Patient has no pulse and is not breathing):    CPR (Attempt to Resuscitate)     Medical Interventions (Patient has pulse or is breathing):    Full Support       Albaro Longoria PA-C   03/03/25  22:14 EST

## 2025-03-04 NOTE — NURSING NOTE
"At approx 0755 this shift, pt asked to see me at bedside. Upon entering the room pt was pulling off bipap mask stating that \"I want to eat breakfast, I have not eat since yesterday.\". Pt was alert and oriented x 4 at that time. Breakfast tray present at bedside. Pt bipap removed per her request, pt was placed on 3 L NC at that time. Pt then begin to eat her breakfast tray. MD Carty was made aware at approximately 8:15 via chat that patient had requested for mask to be removed so that she could eat. Pt was given ample time for food to settle then pt was placed back on bipap at approx 0850.   "

## 2025-03-04 NOTE — PROGRESS NOTES
Pharmacy was consulted to dose vancomycin for pneumonia. Based on an estimated CrCl of 50mL/min, a vancomycin dose of 1g q18hr has been ordered following the initial 2g loading dose. A vancomycin trough will be obtained and the dose will be adjusted as needed to maintain a therapeutic AUC concentration of 400-600mg/L.hr. Thank you for the consult. Pharmacy will continue to follow.     Thank you,   John Montes De Oca, PharmD  23:59 EST

## 2025-03-04 NOTE — CASE MANAGEMENT/SOCIAL WORK
Discharge Planning Assessment   Pasquale     Patient Name: Mayra Hays  MRN: [id number]  Today's Date: 3/4/2025    Admit Date: 3/3/2025    Plan: SS received a consult for currently has home health. SS attempted to speak with pt at bedside on this date. Pt was asleep on Bipap. SS contacted pt's daughter/Hailey DURHAM. Pt lives alone. PCP is Niki Antony. Pt does not utilize home health services at this time. Pt had a referral sent to [facility] but was not admitted. PT has oxygen @ 2L and nebulizer via [facility]. Pt has POA (on file). Pt to return home at discharge with daughter to provide transportation. SS to follow and assist with discharge planning.   Discharge Needs Assessment       Row Name 03/04/25 1621       Living Environment    People in Home alone    Current Living Arrangements home    Primary Care Provided by self    Provides Primary Care For no one    Family Caregiver if Needed child(eligio), adult    Quality of Family Relationships helpful;involved;supportive    Able to Return to Prior Arrangements yes       Resource/Environmental Concerns    Resource/Environmental Concerns none    Transportation Concerns none       Transition Planning    Patient/Family Anticipates Transition to home    Patient/Family Anticipated Services at Transition none    Transportation Anticipated family or friend will provide       Discharge Needs Assessment    Equipment Currently Used at Home oxygen;nebulizer    Anticipated Changes Related to Illness none    Equipment Needed After Discharge none         Discharge Plan       Row Name 03/04/25 1621       Plan    Plan SS received a consult for currently has home health. SS attempted to speak with pt at bedside on this date. Pt was asleep on Bipap. SS contacted pt's daughter/Hailey DURHAM. Pt lives alone. PCP is Niki Antony. Pt does not utilize home health services at this time. Pt had a referral sent to [facility] but was not admitted. PT has oxygen @ 2L and  nebulizer via [facility]. Pt has POA (on file). Pt to return home at discharge with daughter to provide transportation. SS to follow and assist with discharge planning.    Patient/Family in Agreement with Plan yes        Continued Care and Services - Admitted Since 3/3/2025    No active coordination exists for this encounter.         Expected Discharge Date and Time       Expected Discharge Date Expected Discharge Time    Mar 7, 2025            Demographic Summary       Row Name 03/04/25 1620       General Information    Admission Type inpatient    Referral Source nursing    Reason for Consult discharge planning  SS received a consult for currently has home health.          DIANE Bhakta

## 2025-03-05 LAB
A-A DO2: 67.1 MMHG (ref 0–300)
ANION GAP SERPL CALCULATED.3IONS-SCNC: 9 MMOL/L (ref 5–15)
ARTERIAL PATENCY WRIST A: ABNORMAL
ATMOSPHERIC PRESS: 711 MMHG
BASE EXCESS BLDA CALC-SCNC: -0.6 MMOL/L (ref 0–2)
BDY SITE: ABNORMAL
BUN SERPL-MCNC: 35 MG/DL (ref 8–23)
BUN/CREAT SERPL: 34 (ref 7–25)
CALCIUM SPEC-SCNC: 9.1 MG/DL (ref 8.6–10.5)
CHLORIDE SERPL-SCNC: 106 MMOL/L (ref 98–107)
CO2 BLDA-SCNC: 27.1 MMOL/L (ref 22–33)
CO2 SERPL-SCNC: 22 MMOL/L (ref 22–29)
COHGB MFR BLD: 0.9 % (ref 0–5)
CREAT SERPL-MCNC: 1.03 MG/DL (ref 0.57–1)
DEPRECATED RDW RBC AUTO: 43.2 FL (ref 37–54)
EGFRCR SERPLBLD CKD-EPI 2021: 55.8 ML/MIN/1.73
ERYTHROCYTE [DISTWIDTH] IN BLOOD BY AUTOMATED COUNT: 13.2 % (ref 12.3–15.4)
GAS FLOW AIRWAY: 3 LPM
GLUCOSE BLDC GLUCOMTR-MCNC: 171 MG/DL (ref 70–130)
GLUCOSE BLDC GLUCOMTR-MCNC: 181 MG/DL (ref 70–130)
GLUCOSE BLDC GLUCOMTR-MCNC: 244 MG/DL (ref 70–130)
GLUCOSE BLDC GLUCOMTR-MCNC: 274 MG/DL (ref 70–130)
GLUCOSE SERPL-MCNC: 185 MG/DL (ref 65–99)
HCO3 BLDA-SCNC: 25.7 MMOL/L (ref 20–26)
HCT VFR BLD AUTO: 36.2 % (ref 34–46.6)
HCT VFR BLD CALC: 35.9 % (ref 38–51)
HGB BLD-MCNC: 10.8 G/DL (ref 12–15.9)
HGB BLDA-MCNC: 11.7 G/DL (ref 13.5–17.5)
INHALED O2 CONCENTRATION: 32 %
Lab: ABNORMAL
MCH RBC QN AUTO: 26.7 PG (ref 26.6–33)
MCHC RBC AUTO-ENTMCNC: 29.8 G/DL (ref 31.5–35.7)
MCV RBC AUTO: 89.4 FL (ref 79–97)
METHGB BLD QL: 0.4 % (ref 0–3)
MODALITY: ABNORMAL
OXYHGB MFR BLDV: 95.6 % (ref 94–99)
PCO2 BLDA: 48.3 MM HG (ref 35–45)
PCO2 TEMP ADJ BLD: ABNORMAL MM[HG]
PH BLDA: 7.33 PH UNITS (ref 7.35–7.45)
PH, TEMP CORRECTED: ABNORMAL
PLATELET # BLD AUTO: 156 10*3/MM3 (ref 140–450)
PMV BLD AUTO: 11.7 FL (ref 6–12)
PO2 BLDA: 91.8 MM HG (ref 83–108)
PO2 TEMP ADJ BLD: ABNORMAL MM[HG]
POTASSIUM SERPL-SCNC: 4.8 MMOL/L (ref 3.5–5.2)
RBC # BLD AUTO: 4.05 10*6/MM3 (ref 3.77–5.28)
SAO2 % BLDCOA: 96.9 % (ref 94–99)
SODIUM SERPL-SCNC: 137 MMOL/L (ref 136–145)
VANCOMYCIN TROUGH SERPL-MCNC: 12.9 MCG/ML (ref 5–20)
VENTILATOR MODE: ABNORMAL
WBC NRBC COR # BLD AUTO: 4.58 10*3/MM3 (ref 3.4–10.8)

## 2025-03-05 PROCEDURE — 99232 SBSQ HOSP IP/OBS MODERATE 35: CPT | Performed by: INTERNAL MEDICINE

## 2025-03-05 PROCEDURE — 25010000002 CEFEPIME PER 500 MG: Performed by: INTERNAL MEDICINE

## 2025-03-05 PROCEDURE — 63710000001 INSULIN LISPRO (HUMAN) PER 5 UNITS: Performed by: INTERNAL MEDICINE

## 2025-03-05 PROCEDURE — 85027 COMPLETE CBC AUTOMATED: CPT | Performed by: INTERNAL MEDICINE

## 2025-03-05 PROCEDURE — 80202 ASSAY OF VANCOMYCIN: CPT | Performed by: INTERNAL MEDICINE

## 2025-03-05 PROCEDURE — 36600 WITHDRAWAL OF ARTERIAL BLOOD: CPT

## 2025-03-05 PROCEDURE — 94799 UNLISTED PULMONARY SVC/PX: CPT

## 2025-03-05 PROCEDURE — 82375 ASSAY CARBOXYHB QUANT: CPT

## 2025-03-05 PROCEDURE — 97162 PT EVAL MOD COMPLEX 30 MIN: CPT

## 2025-03-05 PROCEDURE — 80048 BASIC METABOLIC PNL TOTAL CA: CPT | Performed by: INTERNAL MEDICINE

## 2025-03-05 PROCEDURE — 25010000002 METHYLPREDNISOLONE PER 40 MG: Performed by: INTERNAL MEDICINE

## 2025-03-05 PROCEDURE — 83050 HGB METHEMOGLOBIN QUAN: CPT

## 2025-03-05 PROCEDURE — 94761 N-INVAS EAR/PLS OXIMETRY MLT: CPT

## 2025-03-05 PROCEDURE — 94664 DEMO&/EVAL PT USE INHALER: CPT

## 2025-03-05 PROCEDURE — 94660 CPAP INITIATION&MGMT: CPT

## 2025-03-05 PROCEDURE — 82805 BLOOD GASES W/O2 SATURATION: CPT

## 2025-03-05 PROCEDURE — 82948 REAGENT STRIP/BLOOD GLUCOSE: CPT

## 2025-03-05 RX ADMIN — WATER 40 MG: 1 INJECTION INTRAMUSCULAR; INTRAVENOUS; SUBCUTANEOUS at 10:08

## 2025-03-05 RX ADMIN — NYSTATIN 1 APPLICATION: 100000 POWDER TOPICAL at 14:36

## 2025-03-05 RX ADMIN — CEFEPIME 2000 MG: 2 INJECTION, POWDER, FOR SOLUTION INTRAVENOUS at 10:07

## 2025-03-05 RX ADMIN — SENNOSIDES AND DOCUSATE SODIUM 2 TABLET: 50; 8.6 TABLET ORAL at 10:08

## 2025-03-05 RX ADMIN — ATORVASTATIN CALCIUM 20 MG: 20 TABLET, FILM COATED ORAL at 10:08

## 2025-03-05 RX ADMIN — Medication 10 ML: at 20:37

## 2025-03-05 RX ADMIN — INSULIN LISPRO 4 UNITS: 100 INJECTION, SOLUTION INTRAVENOUS; SUBCUTANEOUS at 17:45

## 2025-03-05 RX ADMIN — DOXYCYCLINE 100 MG: 100 INJECTION, POWDER, LYOPHILIZED, FOR SOLUTION INTRAVENOUS at 14:36

## 2025-03-05 RX ADMIN — IPRATROPIUM BROMIDE AND ALBUTEROL SULFATE 3 ML: 2.5; .5 SOLUTION RESPIRATORY (INHALATION) at 18:49

## 2025-03-05 RX ADMIN — APIXABAN 5 MG: 5 TABLET, FILM COATED ORAL at 10:08

## 2025-03-05 RX ADMIN — OSELTAMIVIR PHOSPHATE 30 MG: 30 CAPSULE ORAL at 20:35

## 2025-03-05 RX ADMIN — IPRATROPIUM BROMIDE AND ALBUTEROL SULFATE 3 ML: 2.5; .5 SOLUTION RESPIRATORY (INHALATION) at 00:47

## 2025-03-05 RX ADMIN — HYDROXYZINE HYDROCHLORIDE 25 MG: 25 TABLET, FILM COATED ORAL at 20:35

## 2025-03-05 RX ADMIN — NYSTATIN 1 APPLICATION: 100000 POWDER TOPICAL at 17:45

## 2025-03-05 RX ADMIN — DOXYCYCLINE 100 MG: 100 INJECTION, POWDER, LYOPHILIZED, FOR SOLUTION INTRAVENOUS at 00:33

## 2025-03-05 RX ADMIN — INSULIN LISPRO 4 UNITS: 100 INJECTION, SOLUTION INTRAVENOUS; SUBCUTANEOUS at 20:34

## 2025-03-05 RX ADMIN — OSELTAMIVIR PHOSPHATE 30 MG: 30 CAPSULE ORAL at 10:08

## 2025-03-05 RX ADMIN — CEFEPIME 2000 MG: 2 INJECTION, POWDER, FOR SOLUTION INTRAVENOUS at 20:36

## 2025-03-05 RX ADMIN — WATER 40 MG: 1 INJECTION INTRAMUSCULAR; INTRAVENOUS; SUBCUTANEOUS at 20:46

## 2025-03-05 RX ADMIN — Medication 1000 MG: at 14:39

## 2025-03-05 RX ADMIN — APIXABAN 5 MG: 5 TABLET, FILM COATED ORAL at 20:36

## 2025-03-05 RX ADMIN — INSULIN LISPRO 2 UNITS: 100 INJECTION, SOLUTION INTRAVENOUS; SUBCUTANEOUS at 11:58

## 2025-03-05 RX ADMIN — IPRATROPIUM BROMIDE AND ALBUTEROL SULFATE 3 ML: 2.5; .5 SOLUTION RESPIRATORY (INHALATION) at 07:31

## 2025-03-05 RX ADMIN — LISINOPRIL 10 MG: 10 TABLET ORAL at 10:08

## 2025-03-05 RX ADMIN — NYSTATIN 1 APPLICATION: 100000 POWDER TOPICAL at 20:37

## 2025-03-05 RX ADMIN — IPRATROPIUM BROMIDE AND ALBUTEROL SULFATE 3 ML: 2.5; .5 SOLUTION RESPIRATORY (INHALATION) at 13:30

## 2025-03-05 RX ADMIN — Medication 10 ML: at 10:14

## 2025-03-05 RX ADMIN — PANTOPRAZOLE SODIUM 40 MG: 40 TABLET, DELAYED RELEASE ORAL at 06:04

## 2025-03-05 RX ADMIN — BUDESONIDE 0.5 MG: 0.5 INHALANT RESPIRATORY (INHALATION) at 07:30

## 2025-03-05 RX ADMIN — BUDESONIDE 0.5 MG: 0.5 INHALANT RESPIRATORY (INHALATION) at 18:49

## 2025-03-05 RX ADMIN — NYSTATIN 1 APPLICATION: 100000 POWDER TOPICAL at 10:09

## 2025-03-05 NOTE — THERAPY EVALUATION
Acute Care - Physical Therapy Initial Evaluation   Pasquale     Patient Name: Mayra Hays  : 1945  MRN: [id number]  Today's Date: 3/5/2025      Visit Dx:     ICD-10-CM ICD-9-CM   1. Pneumonia of right upper lobe due to infectious organism  J18.9 486   2. Pneumonia of left lower lobe due to infectious organism  J18.9 486   3. Respiratory acidosis  E87.29 276.2     Patient Active Problem List   Diagnosis    Chronic heart failure with preserved ejection fraction    Essential hypertension    Type 2 diabetes mellitus    Abnormal nuclear stress test with moderate to large size anteroapical and lateral wall myocardial ischemia.    Paroxysmal atrial fibrillation    Nocturnal hypoxia    Ventral hernia without obstruction or gangrene    Chronic obstructive pulmonary disease    Former smoker    Gait instability    Chronic respiratory failure with hypoxia    Cigarette nicotine dependence in remission    Acute anemia    Iron deficiency anemia    Malabsorption due to intolerance, not elsewhere classified    Lung mass    Other specified anemias    Primary cancer of right upper lobe of lung    Non-small cell cancer of right lung    Port-A-Cath in place    Hyperlipidemia LDL goal <100    Sepsis due to pneumonia    Respiratory failure     Past Medical History:   Diagnosis Date    Arthritis     CHF (congestive heart failure)     Chronic anticoagulation     Eliquis    Chronic kidney disease     stage II/IIIa    Collapsed lung     COPD (chronic obstructive pulmonary disease)     Diabetes mellitus     TYPE 2    Elevated cholesterol     GERD (gastroesophageal reflux disease)     Hyperlipidemia     Hypertension     Non-small cell lung cancer RUL     Sleep apnea     non compliant with CPAP    Stroke 2019    Wears eyeglasses      Past Surgical History:   Procedure Laterality Date    BRONCHOSCOPY Bilateral 2023    Procedure: BRONCHOSCOPY WITH ENDOBRONCHIAL ULTRASOUND;  Surgeon: Abdulkadir Peter MD;  Location: [facility] OR;   Service: Pulmonary;  Laterality: Bilateral;    BRONCHOSCOPY WITH ION ROBOTIC ASSIST N/A 9/6/2023    Procedure: BRONCHOSCOPY WITH ION ROBOT AND EBUS;  Surgeon: Jhon Clemens MD;  Location:  [facility];  Service: Robotics - Pulmonary;  Laterality: N/A;  Ion cath #6 - 0032,  #6 - 0030, cath guide # 0077. EBUS scope removed with balloon intact.    CARDIAC CATHETERIZATION N/A 08/22/2017    Procedure: Left Heart Cath;  Surgeon: Jatinder Matias MD;  [facility]  [facility];  Service:     CARDIAC CATHETERIZATION N/A 11/22/2021    Procedure: Left Heart Cath;  Surgeon: Tolu Steinberg MD;  [facility]:  [facility];  Service: Cardiology;  Laterality: N/A;    COLONOSCOPY      ENDOSCOPY      GALLBLADDER SURGERY      PORTACATH PLACEMENT N/A 9/29/2023    Procedure: INSERTION OF PORTACATH;  Surgeon: Petr Velásquez MD;  Location:  [facility] OR;  Service: General;  Laterality: N/A;    VENTRAL HERNIA REPAIR N/A 05/14/2020    Procedure: VENTRAL HERNIA REPAIR LAPAROSCOPIC WITH DAVINCI ROBOT;  Surgeon: Petr Velásquez MD;  Location:  [facility];  Service: DaVinci;  Laterality: N/A;     PT Assessment (Last 12 Hours)       PT Evaluation and Treatment       Row Name 03/05/25 1505          Physical Therapy Time and Intention    Subjective Information complains of;weakness;dyspnea  -KM     Document Type evaluation  -KM     Mode of Treatment individual therapy;physical therapy  -KM     Patient Effort good  -KM     Symptoms Noted During/After Treatment shortness of breath  -KM       Row Name 03/05/25 1500          General Information    Patient Profile Reviewed yes  -KM     Patient Observations alert;cooperative;agree to therapy  -KM     Prior Level of Function independent:;all household mobility;ADL's  -KM     Existing Precautions/Restrictions fall;oxygen therapy device and L/min  -KM     Risks Reviewed patient:;LOB;nausea/vomiting;dizziness;increased discomfort  -KM     Benefits Reviewed  patient:;improve function;increase independence;increase strength;increase balance  -KM     Barriers to Rehab none identified  -KM       Row Name 03/05/25 1507          Living Environment    Current Living Arrangements home  -KM     People in Home alone  -KM     Primary Care Provided by self  -KM       Row Name 03/05/25 1507          Home Use of Assistive/Adaptive Equipment    Equipment Currently Used at Home oxygen;nebulizer  -KM       Row Name 03/05/25 1507          Cognition    Affect/Mental Status (Cognition) WFL  -KM     Orientation Status (Cognition) oriented to;person;place  -KM     Follows Commands (Cognition) follows one-step commands  -KM       Row Name 03/05/25 1507          Range of Motion (ROM)    Range of Motion bilateral lower extremities;ROM is WFL  -       Row Name 03/05/25 1507          Strength (Manual Muscle Testing)    Strength (Manual Muscle Testing) bilateral lower extremities;strength is [facility]  -       Row Name 03/05/25 1507          Bed Mobility    Bed Mobility bed mobility (all) activities  -KM     All Activities, [place] (Bed Mobility) contact guard  -       Row Name 03/05/25 1507          Transfers    Transfers sit-stand transfer;stand-sit transfer  -       Row Name 03/05/25 1507          Sit-Stand Transfer    Sit-Stand [place] (Transfers) contact guard  -       Row Name 03/05/25 1507          Stand-Sit Transfer    Stand-Sit [place] (Transfers) contact guard  -       Row Name 03/05/25 1507          Gait/Stairs (Locomotion)    Gait/Stairs Locomotion gait/ambulation independence;distance ambulated  -KM     [place] Level (Gait) contact guard  -     Patient was able to Ambulate yes  -KM     Distance in Feet (Gait) 30  -KM     Pattern (Gait) step-through  -KM     Deviations/Abnormal Patterns (Gait) gait speed decreased  -KM     Comment, (Gait/Stairs) Pt. O2 sats dropped to 83% while ambulating; O2 sats returned to >90%  -KM       Row Name 03/05/25 1507           Safety Issues/Impairments Affecting Functional Mobility    Impairments Affecting Function (Mobility) balance;endurance/activity tolerance  -KM       Row Name 03/05/25 1507          Balance    Balance Assessment sitting static balance;standing dynamic balance  -KM     Static Sitting Balance supervision  -KM     Position, Sitting Balance sitting edge of bed  -KM     Dynamic Standing Balance contact guard  -KM       Row Name 03/05/25 1507          Plan of Care Review    Plan of Care Reviewed With patient  -KM     Outcome Evaluation Pt. evaluation completed during PT session. She was able to perform functional mobility skills w/ CGA. She ambulated short distance in room w/ no AD. Her O2 dropped to 83% while ambulating, but returned >90% prior to PT leaving room. Pt. sitting EOB w/ bed alarm active and call light within reach. JAMES Wilson notified of pt. position change. Pt. would benefit from PT services at this time.  -KM       Row Name 03/05/25 1507          Vital Signs    Pre SpO2 (%) 94  -KM     Intra SpO2 (%) 83  -KM     Post SpO2 (%) 93  -KM       Row Name 03/05/25 1507          Therapy Assessment/Plan (PT)    Patient/Family Therapy Goals Statement (PT) return home and take care of self  -KM     Functional Level at Time of Evaluation (PT) CGA  -KM     PT Diagnosis (PT) decreased endurance  -KM     Rehab Potential (PT) fair  -KM     Criteria for Skilled Interventions Met (PT) yes;skilled treatment is necessary  -KM     Therapy Frequency (PT) 2 times/wk  2-5x/wk  -KM     Predicted Duration of Therapy Intervention (PT) until discharge  -KM     Problem List (PT) problems related to;balance;mobility  -KM     Activity Limitations Related to Problem List (PT) unable to ambulate safely;unable to transfer safely  -KM       Row Name 03/05/25 1507          Therapy Plan Review/Discharge Plan (PT)    Therapy Plan Review (PT) evaluation/treatment results reviewed;care plan/treatment goals reviewed;risks/benefits  reviewed;patient  -KM       Row Name 03/05/25 1507          Physical Therapy Goals    Transfer Goal Selection (PT) transfer, PT goal 1  -KM     Gait Training Goal Selection (PT) gait training, PT goal 1  -KM       Row Name 03/05/25 1507          Transfer Goal 1 (PT)    Activity/Assistive Device (Transfer Goal 1, PT) transfers, all  -KM     [place] Level/Cues Needed (Transfer Goal 1, PT) independent  -KM     Time Frame (Transfer Goal 1, PT) by discharge  -       Row Name 03/05/25 1507          Gait Training Goal 1 (PT)    Activity/Assistive Device (Gait Training Goal 1, PT) gait (walking locomotion)  -KM     [place] Level (Gait Training Goal 1, PT) independent  -KM     Distance (Gait Training Goal 1, PT) 100'  -KM     Time Frame (Gait Training Goal 1, PT) by discharge  -               User Key  (r) = Recorded By, (t) = Taken By, (c) = Cosigned By      Initials Name Provider Type    Fran Moreno, KAREY Physical Therapist                    Physical Therapy Education       Title: PT OT SLP Therapies (Done)       Topic: Physical Therapy (Done)       Point: Mobility training (Done)       Learning Progress Summary            Patient Acceptance, E,TB, VU by  at 3/5/2025 1521                      Point: Home exercise program (Done)       Learning Progress Summary            Patient Acceptance, E,TB, VU by  at 3/5/2025 1521                      Point: Body mechanics (Done)       Learning Progress Summary            Patient Acceptance, E,TB, VU by  at 3/5/2025 1521                      Point: Precautions (Done)       Learning Progress Summary            Patient Acceptance, E,TB, VU by  at 3/5/2025 1521                                      User Key       Initials Effective Dates Name Provider Type [facility] 05/24/22 -  Fran Najera, KAREY Physical Therapist PT                  PT Recommendation and Plan  Anticipated Discharge Disposition (PT): home with assist  Planned Therapy Interventions (PT):  balance training, bed mobility training, gait training, home exercise program, patient/family education, postural re-education, ROM (range of motion), strengthening, stretching, transfer training  Therapy Frequency (PT): 2 times/wk (2-5x/wk)  Plan of Care Reviewed With: patient  Outcome Evaluation: Pt. evaluation completed during PT session. She was able to perform functional mobility skills w/ CGA. She ambulated short distance in room w/ no AD. Her O2 dropped to 83% while ambulating, but returned >90% prior to PT leaving room. Pt. sitting EOB w/ bed alarm active and call light within reach. JAMES Wilson notified of pt. position change. Pt. would benefit from PT services at this time.       Time Calculation:    PT Charges       Row Name 03/05/25 1506             Time Calculation    PT Received On 03/05/25  -KM      PT Goal Re-Cert Due Date 03/19/25  -KM                User Key  (r) = Recorded By, (t) = Taken By, (c) = Cosigned By      Initials Name Provider Type    Fran Moreno, PT Physical Therapist                  Therapy Charges for Today       Code Description Service Date Service Provider Modifiers Qty    07594752387 HC PT EVAL MOD COMPLEXITY 4 3/5/2025 Fran Najera, PT GP 1            PT G-Codes  AM-PAC 6 Clicks Score (PT): 20    Fran Najera PT  3/5/2025

## 2025-03-05 NOTE — PROGRESS NOTES
[facility] HOSPITALIST PROGRESS NOTE     Patient Identification:  Name:  Mayra Hays  Age:  79 y.o.  Sex:  female  :  1945  MRN:  [id number]  Visit Number:  [id number]  Primary Care Provider:  Niki Antony APRN    Length of stay:  1    Chief complaint: Shortness of breath    Subjective:    Patient seen and examined at bedside with family present.  Patient states she is feeling slightly improved in comparison to yesterday.  She still complains of shortness of breath.  She denies any fevers, chills, sweats or rigors.  Per nursing staff, there has been no significant changes since admission.  Review of multiple ABGs demonstrate slowly improving pH and slowly improving CO2.  Did discuss with patient the importance of adherence to BiPAP therapy throughout the day today and continuation tonight.  ----------------------------------------------------------------------------------------------------------------------  Current Hospital Meds:  apixaban, 5 mg, Oral, Q12H  atorvastatin, 20 mg, Oral, Daily  budesonide, 0.5 mg, Nebulization, BID - RT  cefepime, 2,000 mg, Intravenous, Q12H  doxycycline, 100 mg, Intravenous, Q12H  hydrOXYzine, 25 mg, Oral, Nightly  insulin lispro, 2-9 Units, Subcutaneous, 4x Daily AC & at Bedtime  ipratropium-albuterol, 3 mL, Nebulization, 4x Daily - RT  lisinopril, 10 mg, Oral, Daily  methylPREDNISolone sodium succinate, 40 mg, Intravenous, Q12H  nystatin, 1 Application, Topical, 4x Daily  oseltamivir, 30 mg, Oral, Q12H  [START ON 3/5/2025] pantoprazole, 40 mg, Oral, Q AM  senna-docusate sodium, 2 tablet, Oral, BID  sodium chloride, 10 mL, Intravenous, Q12H  vancomycin, 1,000 mg, Intravenous, Q18H      Pharmacy to Dose Oseltamivir (TAMIFLU),       ----------------------------------------------------------------------------------------------------------------------  Vital Signs:  Temp:  [97.1 °F (36.2 °C)-97.9 °F (36.6 °C)] 97.9 °F (36.6 °C)  Heart Rate:  [54-77]  69  Resp:  [20-28] 22  BP: (132-156)/(58-74) 136/64      03/03/25  1446 03/03/25  2325 03/04/25  0500   Weight: 109 kg (240 lb) 96.7 kg (213 lb 3.2 oz) 96.7 kg (213 lb 3.2 oz)     Body mass index is 30.59 kg/m².    Intake/Output Summary (Last 24 hours) at 3/4/2025 1933  Last data filed at 3/4/2025 1803  Gross per 24 hour   Intake 1200 ml   Output 600 ml   Net 600 ml     Diet: Regular/House; Fluid Consistency: Thin (IDDSI 0)  ----------------------------------------------------------------------------------------------------------------------  Physical exam:  Constitutional: Well-nourished  female in no apparent distress.     HENT:  Head:  Normocephalic and atraumatic.  Mouth:  Moist mucous membranes.    Eyes:  Conjunctivae and EOM are normal.  Pupils are equal, round, and reactive to light.  No scleral icterus.    Neck:  Neck supple. No thyromegaly.  No JVD present.    Cardiovascular:  Regular rate and rhythm with no murmurs, rubs, clicks or gallops appreciated.  Pulmonary/Chest: Expiratory wheezing noted bilaterally  Abdominal:  Soft. Nontender. Nondistended  Bowel sounds are normal in all four quadrants. No organomegally appreciated.   Musculoskeletal:  5/5 muscle strength bilateral upper and lower extremities with equal muscle tone and bulk. No edema, no tenderness, and no deformity.  No red or swollen joints anywhere.    Neurological:  Alert, Cranial nerves II-XII intact with no focal deficits.  No facial droop.  No slurred speech.   Skin:  Warm and dry to palpation with no rashes or lesions appreciated.  Peripheral vascular:  2+ radial and pedal pulses in bilateral upper and lower extremities.  Psychiatric:  Alert and oriented x3, demonstrates appropriate judgment and insight.  ---------------------------------------------------------------------------------------  ----------------------------------------------------------------------------------------------------------------------  Results from last 7  "days   Lab Units 03/03/25  1730 03/03/25  1615   HSTROP T ng/L 15* 15*     Results from last 7 days   Lab Units 03/04/25  0044 03/03/25  1920 03/03/25  1615   LACTATE mmol/L  --  0.7  --    WBC 10*3/mm3 4.77  --  4.53   HEMOGLOBIN g/dL 11.8*  --  12.1   HEMATOCRIT % 38.8  --  39.7   MCV fL 88.0  --  87.4   MCHC g/dL 30.4*  --  30.5*   PLATELETS 10*3/mm3 151  --  152   INR   --   --  1.12*     Results from last 7 days   Lab Units 03/04/25  1404   PH, ARTERIAL pH units 7.308*   PO2 ART mm Hg 74.1*   PCO2, ARTERIAL mm Hg 54.6*   HCO3 ART mmol/L 27.3*     Results from last 7 days   Lab Units 03/04/25  0044 03/03/25  1615   SODIUM mmol/L 135* 136   POTASSIUM mmol/L 4.5 4.2   CHLORIDE mmol/L 100 99   CO2 mmol/L 24.4 31.4*   BUN mg/dL 22 19   CREATININE mg/dL 1.07* 1.24*   CALCIUM mg/dL 9.0 9.0   GLUCOSE mg/dL 223* 157*   ALBUMIN g/dL 3.7 4.3   BILIRUBIN mg/dL 0.3 0.3   ALK PHOS U/L 91 92   AST (SGOT) U/L 16 18   ALT (SGPT) U/L 14 14   Estimated Creatinine Clearance: 54.6 mL/min (A) (by C-G formula based on SCr of 1.07 mg/dL (H)).    No results found for: \"AMMONIA\"      No results found for: \"BLOODCX\"  No results found for: \"URINECX\"  No results found for: \"WOUNDCX\"  No results found for: \"STOOLCX\"    I have personally looked at the labs and they are summarized above.  ----------------------------------------------------------------------------------------------------------------------  Imaging Results (Last 24 Hours)       Procedure Component Value Units Date/Time    XR Abdomen KUB [[id number]] Collected: 03/04/25 1335     Updated: 03/04/25 1338    Narrative:      EXAMINATION: XR ABDOMEN KUB-      CLINICAL INDICATION: distention, rule out ileus or obstruction;  J18.9-Pneumonia, unspecified organism; J18.9-Pneumonia, unspecified  organism; E87.29-Other acidosis        COMPARISON: None available     FINDINGS:  2 views of the abdomen     No evidence of bowel obstruction     No suspicious calcifications are identified.      "   This report was finalized on 3/4/2025 1:36 PM by Dr. Mauricio Santiago MD.             ----------------------------------------------------------------------------------------------------------------------  Assessment and Plan:    Acute hypercapnic respiratory failure -have given patient a break from BiPAP this afternoon to allow for eating.  Have encouraged patient to adhere to BiPAP therapy overnight tonight    2.  Chronic hypoxic respiratory failure -continue supplemental oxygen to maintain O2 saturation greater than 90%    3.  Right upper/right lower lobe pneumonia -continue empiric antibiotic therapy with cefepime and doxycycline    4.  Influenza A -Tamiflu    5.  COPD exacerbation -continue Solu-Medrol and DuoNebs    6.  Type 2 diabetes mellitus -continue Accu-Cheks before every meal and nightly with sliding scale insulin    7.  Essential hypertension -currently well-controlled    8.  Hyperlipidemia -statin    Disposition will likely require several more days hospitalization    Arnulfo Carty DO   03/04/25   19:33 EST

## 2025-03-05 NOTE — NURSING NOTE
Transitional Care Note    Enrolled in [facility] Transitional Care Note    Enrolled in [facility] Transitional Care Services under theTCM Model to be followed for 6 weeks post discharge.  BTC will assist with support and education at time of transition home from hospital.  Hospital  will follow throughout the stay at [facility].  Home  will visit within 48 hours of discharge and follow with home vitals and telephone contact for 6 weeks.      Patient admitted to [facility] via Emergency Department.  Admitted for further treatment of resp failure.    Explained transition to home program and pts dtr, Hailey, is agreeable to home visits.    Home  will be Alise Ramírez LPN

## 2025-03-05 NOTE — CASE MANAGEMENT/SOCIAL WORK
Discharge Planning Assessment  [facility]     Patient Name: Mayra Hays  MRN: [id number]  Today's Date: 3/5/2025    Admit Date: 3/3/2025    Plan: Pt lives alone. PCP is Niki Antony. Pt does not utilize home health services at this time. Pt had a referral sent to [facility] but was not admitted. PT has oxygen @ 2L and nebulizer via [employer]. Pt has POA (on file). Pt to return home at discharge with daughter to provide transportation. SS to follow.       Discharge Plan       Row Name 03/05/25 1611       Plan    Plan Pt lives alone. PCP is Niki Antony. Pt does not utilize home health services at this time. Pt had a referral sent to [facility] but was not admitted. PT has oxygen @ 2L and nebulizer via [employer]. Pt has POA (on file). Pt to return home at discharge with daughter to provide transportation. SS to follow.        Continued Care and Services - Admitted Since 3/3/2025    No active coordination exists for this encounter.      Expected Discharge Date and Time       Expected Discharge Date Expected Discharge Time    Mar 7, 2025        DIANE Bhakta

## 2025-03-05 NOTE — PROGRESS NOTES
Kinetics :   vancomycin  day 3    The pre-dose vancomycin level was within the desired goal range for this therapy.   Plan to continue the same for now and monitor with you.

## 2025-03-05 NOTE — PROGRESS NOTES
[facility]     Patient Identification:  Name:  Mayra Hays  Age:  79 y.o.  Sex:  female  :  1945  MRN:  [id number]  Visit Number:  [id number]  Primary Care Provider:  Niki Antony APRN    Length of stay:  2    Chief complaint: Shortness of breath    Subjective:    Patient seen and examined at bedside with no nursing staff or family present.  Patient states she feels well and back to her baseline functioning status.  Patient has not used BiPAP today and still appears well.  Patient denies any new symptoms or events overnight.  ----------------------------------------------------------------------------------------------------------------------  Current Hospital Meds:  apixaban, 5 mg, Oral, Q12H  atorvastatin, 20 mg, Oral, Daily  budesonide, 0.5 mg, Nebulization, BID - RT  cefepime, 2,000 mg, Intravenous, Q12H  doxycycline, 100 mg, Intravenous, Q12H  hydrOXYzine, 25 mg, Oral, Nightly  insulin lispro, 2-9 Units, Subcutaneous, 4x Daily AC & at Bedtime  ipratropium-albuterol, 3 mL, Nebulization, 4x Daily - RT  lisinopril, 10 mg, Oral, Daily  methylPREDNISolone sodium succinate, 40 mg, Intravenous, Q12H  nystatin, 1 Application, Topical, 4x Daily  oseltamivir, 30 mg, Oral, Q12H  pantoprazole, 40 mg, Oral, Q AM  senna-docusate sodium, 2 tablet, Oral, BID  sodium chloride, 10 mL, Intravenous, Q12H  vancomycin, 1,000 mg, Intravenous, Q18H      Pharmacy to Dose Oseltamivir (TAMIFLU),       ----------------------------------------------------------------------------------------------------------------------  Vital Signs:  Temp:  [97.4 °F (36.3 °C)-98.4 °F (36.9 °C)] 97.9 °F (36.6 °C)  Heart Rate:  [50-80] 80  Resp:  [18-31] 22  BP: (128-160)/(53-79) 151/58      25  2325 25  0500 25  0500   Weight: 96.7 kg (213 lb 3.2 oz) 96.7 kg (213 lb 3.2 oz) 97.5 kg (215 lb)     Body mass index is 30.85 kg/m².    Intake/Output Summary (Last 24 hours) at 3/5/2025  1755  Last data filed at 3/5/2025 1738  Gross per 24 hour   Intake 1680 ml   Output 1800 ml   Net -120 ml     Diet: Regular/House; Fluid Consistency: Thin (IDDSI 0)  ----------------------------------------------------------------------------------------------------------------------  Physical exam:  Constitutional: Well-nourished  female in no apparent distress.     HENT:  Head:  Normocephalic and atraumatic.  Mouth:  Moist mucous membranes.    Eyes:  Conjunctivae and EOM are normal.  Pupils are equal, round, and reactive to light.  No scleral icterus.    Neck:  Neck supple. No thyromegaly.  No JVD present.    Cardiovascular:  Regular rate and rhythm with no murmurs, rubs, clicks or gallops appreciated.  Pulmonary/Chest: Expiratory wheezing noted bilaterally  Abdominal:  Soft. Nontender. Nondistended  Bowel sounds are normal in all four quadrants. No organomegally appreciated.   Musculoskeletal:  5/5 muscle strength bilateral upper and lower extremities with equal muscle tone and bulk. No edema, no tenderness, and no deformity.  No red or swollen joints anywhere.    Neurological:  Alert, Cranial nerves II-XII intact with no focal deficits.  No facial droop.  No slurred speech.   Skin:  Warm and dry to palpation with no rashes or lesions appreciated.  Peripheral vascular:  2+ radial and pedal pulses in bilateral upper and lower extremities.  Psychiatric:  Alert and oriented x3, demonstrates appropriate judgment and insight.    No significant change in physical exam in comparison to 3/4/2025  ---------------------------------------------------------------------------------------  ----------------------------------------------------------------------------------------------------------------------  Results from last 7 days   Lab Units 03/03/25  1730 03/03/25  1615   HSTROP T ng/L 15* 15*     Results from last 7 days   Lab Units 03/05/25  0127 03/04/25  0044 03/03/25  1920 03/03/25  1615   LACTATE mmol/L  --    "--  0.7  --    WBC 10*3/mm3 4.58 4.77  --  4.53   HEMOGLOBIN g/dL 10.8* 11.8*  --  12.1   HEMATOCRIT % 36.2 38.8  --  39.7   MCV fL 89.4 88.0  --  87.4   MCHC g/dL 29.8* 30.4*  --  30.5*   PLATELETS 10*3/mm3 156 151  --  152   INR   --   --   --  1.12*     Results from last 7 days   Lab Units 03/05/25  1210   PH, ARTERIAL pH units 7.334*   PO2 ART mm Hg 91.8   PCO2, ARTERIAL mm Hg 48.3*   HCO3 ART mmol/L 25.7     Results from last 7 days   Lab Units 03/05/25  0127 03/04/25  0044 03/03/25  1615   SODIUM mmol/L 137 135* 136   POTASSIUM mmol/L 4.8 4.5 4.2   CHLORIDE mmol/L 106 100 99   CO2 mmol/L 22.0 24.4 31.4*   BUN mg/dL 35* 22 19   CREATININE mg/dL 1.03* 1.07* 1.24*   CALCIUM mg/dL 9.1 9.0 9.0   GLUCOSE mg/dL 185* 223* 157*   ALBUMIN g/dL  --  3.7 4.3   BILIRUBIN mg/dL  --  0.3 0.3   ALK PHOS U/L  --  91 92   AST (SGOT) U/L  --  16 18   ALT (SGPT) U/L  --  14 14   Estimated Creatinine Clearance: 56.9 mL/min (A) (by C-G formula based on SCr of 1.03 mg/dL (H)).    No results found for: \"AMMONIA\"      No results found for: \"BLOODCX\"  No results found for: \"URINECX\"  No results found for: \"WOUNDCX\"  No results found for: \"STOOLCX\"    I have personally looked at the labs and they are summarized above.  ----------------------------------------------------------------------------------------------------------------------  Imaging Results (Last 24 Hours)       ** No results found for the last 24 hours. **          ----------------------------------------------------------------------------------------------------------------------  Assessment and Plan:    Acute hypercapnic respiratory failure -acute phase appears resolved, continue to monitor closely over the next 24 hours    2.  Chronic hypoxic respiratory failure -continue supplemental oxygen to maintain O2 saturation greater than 90%    3.  Right upper/right lower lobe pneumonia -continue empiric antibiotic therapy with cefepime and doxycycline    4.  Influenza A " -Tamiflu    5.  COPD exacerbation -continue Solu-Medrol and DuoNebs    6.  Type 2 diabetes mellitus -continue Accu-Cheks before every meal and nightly with sliding scale insulin    7.  Essential hypertension -currently well-controlled    8.  Hyperlipidemia -statin    Disposition will likely require several more days hospitalization    Arnulfo Carty DO   03/05/25   17:55 EST

## 2025-03-05 NOTE — PLAN OF CARE
Goal Outcome Evaluation:   Patient is pleasant, compliant with care. Patient shows no s/s of distress at this time. Patient resting in bed, call light in reach.

## 2025-03-06 LAB
ANION GAP SERPL CALCULATED.3IONS-SCNC: 8.5 MMOL/L (ref 5–15)
BACTERIA SPEC RESP CULT: NO GROWTH
BUN SERPL-MCNC: 35 MG/DL (ref 8–23)
BUN/CREAT SERPL: 31.5 (ref 7–25)
CALCIUM SPEC-SCNC: 9.1 MG/DL (ref 8.6–10.5)
CHLORIDE SERPL-SCNC: 108 MMOL/L (ref 98–107)
CO2 SERPL-SCNC: 23.5 MMOL/L (ref 22–29)
CREAT SERPL-MCNC: 1.11 MG/DL (ref 0.57–1)
DEPRECATED RDW RBC AUTO: 42.5 FL (ref 37–54)
EGFRCR SERPLBLD CKD-EPI 2021: 51 ML/MIN/1.73
ERYTHROCYTE [DISTWIDTH] IN BLOOD BY AUTOMATED COUNT: 13.1 % (ref 12.3–15.4)
GLUCOSE BLDC GLUCOMTR-MCNC: 181 MG/DL (ref 70–130)
GLUCOSE BLDC GLUCOMTR-MCNC: 280 MG/DL (ref 70–130)
GLUCOSE BLDC GLUCOMTR-MCNC: 289 MG/DL (ref 70–130)
GLUCOSE BLDC GLUCOMTR-MCNC: 303 MG/DL (ref 70–130)
GLUCOSE SERPL-MCNC: 240 MG/DL (ref 65–99)
GRAM STN SPEC: NORMAL
HCT VFR BLD AUTO: 37.6 % (ref 34–46.6)
HGB BLD-MCNC: 11.2 G/DL (ref 12–15.9)
MCH RBC QN AUTO: 26.4 PG (ref 26.6–33)
MCHC RBC AUTO-ENTMCNC: 29.8 G/DL (ref 31.5–35.7)
MCV RBC AUTO: 88.5 FL (ref 79–97)
PLATELET # BLD AUTO: 162 10*3/MM3 (ref 140–450)
PMV BLD AUTO: 11.5 FL (ref 6–12)
POTASSIUM SERPL-SCNC: 5 MMOL/L (ref 3.5–5.2)
RBC # BLD AUTO: 4.25 10*6/MM3 (ref 3.77–5.28)
SODIUM SERPL-SCNC: 140 MMOL/L (ref 136–145)
WBC NRBC COR # BLD AUTO: 5.97 10*3/MM3 (ref 3.4–10.8)

## 2025-03-06 PROCEDURE — 82948 REAGENT STRIP/BLOOD GLUCOSE: CPT

## 2025-03-06 PROCEDURE — 94664 DEMO&/EVAL PT USE INHALER: CPT

## 2025-03-06 PROCEDURE — 80048 BASIC METABOLIC PNL TOTAL CA: CPT | Performed by: INTERNAL MEDICINE

## 2025-03-06 PROCEDURE — 94799 UNLISTED PULMONARY SVC/PX: CPT

## 2025-03-06 PROCEDURE — 94660 CPAP INITIATION&MGMT: CPT

## 2025-03-06 PROCEDURE — 25010000002 CEFEPIME PER 500 MG: Performed by: INTERNAL MEDICINE

## 2025-03-06 PROCEDURE — 99232 SBSQ HOSP IP/OBS MODERATE 35: CPT | Performed by: INTERNAL MEDICINE

## 2025-03-06 PROCEDURE — 85027 COMPLETE CBC AUTOMATED: CPT | Performed by: INTERNAL MEDICINE

## 2025-03-06 PROCEDURE — 97530 THERAPEUTIC ACTIVITIES: CPT

## 2025-03-06 PROCEDURE — 97110 THERAPEUTIC EXERCISES: CPT

## 2025-03-06 PROCEDURE — 25010000002 METHYLPREDNISOLONE PER 40 MG: Performed by: INTERNAL MEDICINE

## 2025-03-06 PROCEDURE — 94761 N-INVAS EAR/PLS OXIMETRY MLT: CPT

## 2025-03-06 PROCEDURE — 63710000001 INSULIN LISPRO (HUMAN) PER 5 UNITS: Performed by: INTERNAL MEDICINE

## 2025-03-06 RX ORDER — PREDNISONE 20 MG/1
40 TABLET ORAL
Status: DISCONTINUED | OUTPATIENT
Start: 2025-03-07 | End: 2025-03-07 | Stop reason: HOSPADM

## 2025-03-06 RX ADMIN — CEFEPIME 2000 MG: 2 INJECTION, POWDER, FOR SOLUTION INTRAVENOUS at 09:41

## 2025-03-06 RX ADMIN — INSULIN LISPRO 7 UNITS: 100 INJECTION, SOLUTION INTRAVENOUS; SUBCUTANEOUS at 11:12

## 2025-03-06 RX ADMIN — IPRATROPIUM BROMIDE AND ALBUTEROL SULFATE 3 ML: 2.5; .5 SOLUTION RESPIRATORY (INHALATION) at 07:28

## 2025-03-06 RX ADMIN — WATER 40 MG: 1 INJECTION INTRAMUSCULAR; INTRAVENOUS; SUBCUTANEOUS at 09:41

## 2025-03-06 RX ADMIN — INSULIN LISPRO 6 UNITS: 100 INJECTION, SOLUTION INTRAVENOUS; SUBCUTANEOUS at 21:34

## 2025-03-06 RX ADMIN — HYDROXYZINE HYDROCHLORIDE 25 MG: 25 TABLET, FILM COATED ORAL at 21:33

## 2025-03-06 RX ADMIN — LISINOPRIL 10 MG: 10 TABLET ORAL at 09:41

## 2025-03-06 RX ADMIN — Medication 10 ML: at 21:41

## 2025-03-06 RX ADMIN — DOXYCYCLINE 100 MG: 100 INJECTION, POWDER, LYOPHILIZED, FOR SOLUTION INTRAVENOUS at 00:31

## 2025-03-06 RX ADMIN — NYSTATIN 1 APPLICATION: 100000 POWDER TOPICAL at 21:34

## 2025-03-06 RX ADMIN — INSULIN LISPRO 6 UNITS: 100 INJECTION, SOLUTION INTRAVENOUS; SUBCUTANEOUS at 16:52

## 2025-03-06 RX ADMIN — IPRATROPIUM BROMIDE AND ALBUTEROL SULFATE 3 ML: 2.5; .5 SOLUTION RESPIRATORY (INHALATION) at 18:57

## 2025-03-06 RX ADMIN — NYSTATIN 1 APPLICATION: 100000 POWDER TOPICAL at 11:12

## 2025-03-06 RX ADMIN — NYSTATIN 1 APPLICATION: 100000 POWDER TOPICAL at 09:42

## 2025-03-06 RX ADMIN — APIXABAN 5 MG: 5 TABLET, FILM COATED ORAL at 21:33

## 2025-03-06 RX ADMIN — PANTOPRAZOLE SODIUM 40 MG: 40 TABLET, DELAYED RELEASE ORAL at 06:10

## 2025-03-06 RX ADMIN — IPRATROPIUM BROMIDE AND ALBUTEROL SULFATE 3 ML: 2.5; .5 SOLUTION RESPIRATORY (INHALATION) at 00:12

## 2025-03-06 RX ADMIN — ATORVASTATIN CALCIUM 20 MG: 20 TABLET, FILM COATED ORAL at 09:41

## 2025-03-06 RX ADMIN — INSULIN LISPRO 2 UNITS: 100 INJECTION, SOLUTION INTRAVENOUS; SUBCUTANEOUS at 09:42

## 2025-03-06 RX ADMIN — BUDESONIDE 0.5 MG: 0.5 INHALANT RESPIRATORY (INHALATION) at 07:28

## 2025-03-06 RX ADMIN — NYSTATIN 1 APPLICATION: 100000 POWDER TOPICAL at 16:52

## 2025-03-06 RX ADMIN — Medication 10 ML: at 09:42

## 2025-03-06 RX ADMIN — APIXABAN 5 MG: 5 TABLET, FILM COATED ORAL at 09:41

## 2025-03-06 RX ADMIN — IPRATROPIUM BROMIDE AND ALBUTEROL SULFATE 3 ML: 2.5; .5 SOLUTION RESPIRATORY (INHALATION) at 13:15

## 2025-03-06 RX ADMIN — Medication 1000 MG: at 06:10

## 2025-03-06 RX ADMIN — BUDESONIDE 0.5 MG: 0.5 INHALANT RESPIRATORY (INHALATION) at 18:57

## 2025-03-06 RX ADMIN — OSELTAMIVIR PHOSPHATE 30 MG: 30 CAPSULE ORAL at 21:33

## 2025-03-06 RX ADMIN — OSELTAMIVIR PHOSPHATE 30 MG: 30 CAPSULE ORAL at 09:41

## 2025-03-06 NOTE — PLAN OF CARE
"  Problem: Noninvasive Ventilation Acute  Goal: Effective Unassisted Ventilation and Oxygenation  Outcome: Progressing   Goal Outcome Evaluation:     Patient states:  \"the doctor told me not to wear bipap\".                                          "

## 2025-03-06 NOTE — THERAPY TREATMENT NOTE
Acute Care - Physical Therapy Treatment Note   [facility]     Patient Name: Mayra Hays  : 1945  MRN: [id number]  Today's Date: 3/6/2025      Visit Dx:     ICD-10-CM ICD-9-CM   1. Pneumonia of right upper lobe due to infectious organism  J18.9 486   2. Pneumonia of left lower lobe due to infectious organism  J18.9 486   3. Respiratory acidosis  E87.29 276.2     Patient Active Problem List   Diagnosis    Chronic heart failure with preserved ejection fraction    Essential hypertension    Type 2 diabetes mellitus    Abnormal nuclear stress test with moderate to large size anteroapical and lateral wall myocardial ischemia.    Paroxysmal atrial fibrillation    Nocturnal hypoxia    Ventral hernia without obstruction or gangrene    Chronic obstructive pulmonary disease    Former smoker    Gait instability    Chronic respiratory failure with hypoxia    Cigarette nicotine dependence in remission    Acute anemia    Iron deficiency anemia    Malabsorption due to intolerance, not elsewhere classified    Lung mass    Other specified anemias    Primary cancer of right upper lobe of lung    Non-small cell cancer of right lung    Port-A-Cath in place    Hyperlipidemia LDL goal <100    Sepsis due to pneumonia    Respiratory failure     Past Medical History:   Diagnosis Date    Arthritis     CHF (congestive heart failure)     Chronic anticoagulation     Eliquis    Chronic kidney disease     stage II/IIIa    Collapsed lung     COPD (chronic obstructive pulmonary disease)     Diabetes mellitus     TYPE 2    Elevated cholesterol     GERD (gastroesophageal reflux disease)     Hyperlipidemia     Hypertension     Non-small cell lung cancer RUL     Sleep apnea     non compliant with CPAP    Stroke 2019    Wears eyeglasses      Past Surgical History:   Procedure Laterality Date    BRONCHOSCOPY Bilateral 2023    Procedure: BRONCHOSCOPY WITH ENDOBRONCHIAL ULTRASOUND;  Surgeon: Abdulkadir Peter MD;  Location: [facility];  Service:  Pulmonary;  Laterality: Bilateral;    BRONCHOSCOPY WITH ION ROBOTIC ASSIST N/A 9/6/2023    Procedure: BRONCHOSCOPY WITH ION ROBOT AND EBUS;  Surgeon: John Clemens MD;  Location:  [facility];  Service: Robotics - Pulmonary;  Laterality: N/A;  Ion cath #6 - 0032,  #6 - 0030, cath guide # 0077. EBUS scope removed with balloon intact.    CARDIAC CATHETERIZATION N/A 08/22/2017    Procedure: Left Heart Cath;  Surgeon: Jatinder Matias MD;  [facility];  Service:     CARDIAC CATHETERIZATION N/A 11/22/2021    Procedure: Left Heart Cath;  Surgeon: Tolu Steinberg MD;  Location:  [facility];  Service: Cardiology;  Laterality: N/A;    COLONOSCOPY      ENDOSCOPY      GALLBLADDER SURGERY      PORTACATH PLACEMENT N/A 9/29/2023    Procedure: INSERTION OF PORTACATH;  Surgeon: Petr Velásquez MD;  Location:  [facility];  Service: General;  Laterality: N/A;    VENTRAL HERNIA REPAIR N/A 05/14/2020    Procedure: VENTRAL HERNIA REPAIR LAPAROSCOPIC WITH DAVINCI ROBOT;  Surgeon: Petr Velásquez MD;  Location:  [facility];  Service: DaVinci;  Laterality: N/A;     PT Assessment (Last 12 Hours)       PT Evaluation and Treatment       Row Name 03/06/25 1313          Physical Therapy Time and Intention    Subjective Information no complaints  -HC     Document Type therapy note (daily note)  -HC     Mode of Treatment individual therapy;physical therapy  -HC     Patient Effort good  -HC     Comment Pt and RN in agreement for PT. Pt had just returned back to bed for BR. Pt completed sitting exercises to tolerance. Sit to/ from stand x 5 SBA.  -HC       Row Name 03/06/25 1313          General Information    Patient Profile Reviewed yes  -HC     Existing Precautions/Restrictions fall;oxygen therapy device and L/min  -HC       Row Name 03/06/25 1313          Living Environment    Primary Care Provided by self  -HC       Row Name 03/06/25 1313          Home Use of  Assistive/Adaptive Equipment    Equipment Currently Used at Home oxygen;nebulizer  -       Row Name 03/06/25 1313          Cognition    Affect/Mental Status (Cognition) WFL  -     Orientation Status (Cognition) oriented to;person;place  -     Follows Commands (Cognition) follows one-step commands  -     Personal Safety Interventions fall prevention program maintained;nonskid shoes/slippers when out of bed;supervised activity  -       Row Name 03/06/25 1313          Bed Mobility    Bed Mobility bed mobility (all) activities  -     All Activities, [place] (Bed Mobility) contact guard;standby assist  -       Row Name 03/06/25 1313          Transfers    Transfers sit-stand transfer;stand-sit transfer  -       Row Name 03/06/25 1313          Sit-Stand Transfer    Sit-Stand [place] (Transfers) contact guard;standby assist  -       Row Name 03/06/25 1313          Stand-Sit Transfer    Stand-Sit [place] (Transfers) contact guard;standby assist  -       Row Name 03/06/25 1313          Safety Issues/Impairments Affecting Functional Mobility    Impairments Affecting Function (Mobility) balance;endurance/activity tolerance  -       Row Name 03/06/25 1313          Motor Skills    Therapeutic Exercise other (see comments)  Sitting: Ap, LAQ, March, knees in/out. Sit to/ from stand  -       Row Name 03/06/25 1313          Positioning and Restraints    Pre-Treatment Position in bed  -     Post Treatment Position bed  -     In Bed fowlers;call light within reach;encouraged to call for assist;exit alarm on;side rails up x2  -       Row Name 03/06/25 1313          Therapy Assessment/Plan (PT)    Rehab Potential (PT) fair  -     Criteria for Skilled Interventions Met (PT) yes;skilled treatment is necessary  -     Therapy Frequency (PT) 2 times/wk  2-5x/wk  -     Problem List (PT) problems related to;balance;mobility  -     Activity Limitations Related to Problem List (PT) unable to  ambulate safely;unable to transfer safely  -HC       Row Name 03/06/25 1313          Physical Therapy Goals    Transfer Goal Selection (PT) transfer, PT goal 1  -HC     Gait Training Goal Selection (PT) gait training, PT goal 1  -HC       Row Name 03/06/25 1313          Transfer Goal 1 (PT)    Activity/Assistive Device (Transfer Goal 1, PT) transfers, all  -HC     [place] Level/Cues Needed (Transfer Goal 1, PT) independent  -HC     Time Frame (Transfer Goal 1, PT) by discharge  -HC       Row Name 03/06/25 1313          Gait Training Goal 1 (PT)    Activity/Assistive Device (Gait Training Goal 1, PT) gait (walking locomotion)  -HC     [place] Level (Gait Training Goal 1, PT) independent  -HC     Distance (Gait Training Goal 1, PT) 100'  -HC     Time Frame (Gait Training Goal 1, PT) by discharge  -               User Key  (r) = Recorded By, (t) = Taken By, (c) = Cosigned By      Initials Name Provider Type    Vaishali Pickett, PTA Physical Therapist Assistant                    Physical Therapy Education       Title: PT OT SLP Therapies (Done)       Topic: Physical Therapy (Done)       Point: Mobility training (Done)       Learning Progress Summary            Patient Acceptance, E,TB, VU by  at 3/5/2025 1521                      Point: Home exercise program (Done)       Learning Progress Summary            Patient Acceptance, E,TB, VU by  at 3/5/2025 1521                      Point: Body mechanics (Done)       Learning Progress Summary            Patient Acceptance, E,TB, VU by  at 3/5/2025 1521                      Point: Precautions (Done)       Learning Progress Summary            Patient Acceptance, E,TB, VU by  at 3/5/2025 1521                                      User Key       Initials Effective Dates Name Provider Type Discipline    TIM 05/24/22 -  Fran Najera, PT Physical Therapist PT                  PT Recommendation and Plan  Anticipated Discharge Disposition (PT): home with  assist  Therapy Frequency (PT): 2 times/wk (2-5x/wk)          Time Calculation:    PT Charges       Row Name 03/06/25 1317             Time Calculation    PT Received On 03/06/25  -         Time Calculation- PT    Total Timed Code Minutes- PT 24 minute(s)  -                User Key  (r) = Recorded By, (t) = Taken By, (c) = Cosigned By      Initials Name Provider Type     Vaishali Mcdonald, AUTMUN Physical Therapist Assistant                  Therapy Charges for Today       Code Description Service Date Service Provider Modifiers Qty    55907577047  PT THER PROC EA 15 MIN 3/6/2025 Vaishali Mcdonald PTA GP, CQ 1    [id number]  PT THERAPEUTIC ACT EA 15 MIN 3/6/2025 Vaishali Mcdonald PTA GP, CQ 1            PT G-Codes  AM-PAC 6 Clicks Score (PT): 22    Vaishali Mcdonald PTA  3/6/2025

## 2025-03-06 NOTE — PLAN OF CARE
Goal Outcome Evaluation:  Pt resting in bed with no s/s of distress noted. VSS. IV access maintained. Pt refused bipap during shift. Salome MAURER aware. Call light and belongings within reach. Plan of care ongoing.

## 2025-03-06 NOTE — PLAN OF CARE
Goal Outcome Evaluation:  Plan of Care Reviewed With: patient   Will continue with plan of care.     Problem: Adult Inpatient Plan of Care  Goal: Plan of Care Review  Outcome: Progressing  Plan of Care Reviewed With: patient

## 2025-03-06 NOTE — PROGRESS NOTES
[facility] HOSPITALIST PROGRESS NOTE     Patient Identification:  Name:  Mayra Hays  Age:  79 y.o.  Sex:  female  :  1945  MRN:  [id number]  Visit Number:  [id number]  Primary Care Provider:  Niki Antony APRN    Length of stay:  3    Chief complaint: General Malaise    Subjective:    Patient seen and examined at bedside with no nursing staff present.  Patient states she does not feel quite as well today as she did yesterday.  She denies any worsening shortness of breath and cannot give specifics as to what feels worse today.  Otherwise, no new events overnight.  ----------------------------------------------------------------------------------------------------------------------  Current Hospital Meds:  apixaban, 5 mg, Oral, Q12H  atorvastatin, 20 mg, Oral, Daily  budesonide, 0.5 mg, Nebulization, BID - RT  [START ON 3/7/2025] cefTRIAXone, 2,000 mg, Intravenous, Q24H  hydrOXYzine, 25 mg, Oral, Nightly  insulin lispro, 2-9 Units, Subcutaneous, 4x Daily AC & at Bedtime  ipratropium-albuterol, 3 mL, Nebulization, 4x Daily - RT  lisinopril, 10 mg, Oral, Daily  nystatin, 1 Application, Topical, 4x Daily  oseltamivir, 30 mg, Oral, Q12H  pantoprazole, 40 mg, Oral, Q AM  [START ON 3/7/2025] predniSONE, 40 mg, Oral, Daily With Breakfast  senna-docusate sodium, 2 tablet, Oral, BID  sodium chloride, 10 mL, Intravenous, Q12H      Pharmacy to Dose Oseltamivir (TAMIFLU),       ----------------------------------------------------------------------------------------------------------------------  Vital Signs:  Temp:  [97.6 °F (36.4 °C)-98 °F (36.7 °C)] 98 °F (36.7 °C)  Heart Rate:  [62-79] 69  Resp:  [14-20] 16  BP: (103-154)/(41-76) 144/76      25  0500 25  0500 25  0500   Weight: 96.7 kg (213 lb 3.2 oz) 97.5 kg (215 lb) 97.2 kg (214 lb 3.2 oz)     Body mass index is 30.73 kg/m².    Intake/Output Summary (Last 24 hours) at 3/6/2025 1802  Last data filed at 3/6/2025 1300  Gross per  24 hour   Intake 1200 ml   Output 900 ml   Net 300 ml     Diet: Regular/House; Fluid Consistency: Thin (IDDSI 0)  ----------------------------------------------------------------------------------------------------------------------  Physical exam:  Constitutional: Well-nourished  female in no apparent distress.     HENT:  Head:  Normocephalic and atraumatic.  Mouth:  Moist mucous membranes.    Eyes:  Conjunctivae and EOM are normal.  Pupils are equal, round, and reactive to light.  No scleral icterus.    Neck:  Neck supple. No thyromegaly.  No JVD present.    Cardiovascular:  Regular rate and rhythm with no murmurs, rubs, clicks or gallops appreciated.  Pulmonary/Chest: Expiratory wheezing noted bilaterally  Abdominal:  Soft. Nontender. Nondistended  Bowel sounds are normal in all four quadrants. No organomegally appreciated.   Musculoskeletal:  5/5 muscle strength bilateral upper and lower extremities with equal muscle tone and bulk. No edema, no tenderness, and no deformity.  No red or swollen joints anywhere.    Neurological:  Alert, Cranial nerves II-XII intact with no focal deficits.  No facial droop.  No slurred speech.   Skin:  Warm and dry to palpation with no rashes or lesions appreciated.  Peripheral vascular:  2+ radial and pedal pulses in bilateral upper and lower extremities.  Psychiatric:  Alert and oriented x3, demonstrates appropriate judgment and insight.    No significant change in physical exam in comparison to 3/5/2025  ---------------------------------------------------------------------------------------  ----------------------------------------------------------------------------------------------------------------------  Results from last 7 days   Lab Units 03/03/25  1730 03/03/25  1615   HSTROP T ng/L 15* 15*     Results from last 7 days   Lab Units 03/06/25  0212 03/05/25  0127 03/04/25  0044 03/03/25  1920 03/03/25  1615   LACTATE mmol/L  --   --   --  0.7  --    WBC 10*3/mm3  "5.97 4.58 4.77  --  4.53   HEMOGLOBIN g/dL 11.2* 10.8* 11.8*  --  12.1   HEMATOCRIT % 37.6 36.2 38.8  --  39.7   MCV fL 88.5 89.4 88.0  --  87.4   MCHC g/dL 29.8* 29.8* 30.4*  --  30.5*   PLATELETS 10*3/mm3 162 156 151  --  152   INR   --   --   --   --  1.12*     Results from last 7 days   Lab Units 03/05/25  1210   PH, ARTERIAL pH units 7.334*   PO2 ART mm Hg 91.8   PCO2, ARTERIAL mm Hg 48.3*   HCO3 ART mmol/L 25.7     Results from last 7 days   Lab Units 03/06/25  0212 03/05/25  0127 03/04/25  0044 03/03/25  1615   SODIUM mmol/L 140 137 135* 136   POTASSIUM mmol/L 5.0 4.8 4.5 4.2   CHLORIDE mmol/L 108* 106 100 99   CO2 mmol/L 23.5 22.0 24.4 31.4*   BUN mg/dL 35* 35* 22 19   CREATININE mg/dL 1.11* 1.03* 1.07* 1.24*   CALCIUM mg/dL 9.1 9.1 9.0 9.0   GLUCOSE mg/dL 240* 185* 223* 157*   ALBUMIN g/dL  --   --  3.7 4.3   BILIRUBIN mg/dL  --   --  0.3 0.3   ALK PHOS U/L  --   --  91 92   AST (SGOT) U/L  --   --  16 18   ALT (SGPT) U/L  --   --  14 14   Estimated Creatinine Clearance: 52.8 mL/min (A) (by C-G formula based on SCr of 1.11 mg/dL (H)).    No results found for: \"AMMONIA\"      No results found for: \"BLOODCX\"  No results found for: \"URINECX\"  No results found for: \"WOUNDCX\"  No results found for: \"STOOLCX\"    I have personally looked at the labs and they are summarized above.  ----------------------------------------------------------------------------------------------------------------------  Imaging Results (Last 24 Hours)       ** No results found for the last 24 hours. **          ----------------------------------------------------------------------------------------------------------------------  Assessment and Plan:    Acute hypercapnic respiratory failure - acute phase appears resolved, continue to monitor closely over the next 24 hours    2.  Chronic hypoxic respiratory failure -continue supplemental oxygen to maintain O2 saturation greater than 90%    3.  Right upper/right lower lobe pneumonia -will " transition cefepime and doxycycline to Rocephin    4.  Influenza A -Tamiflu    5.  COPD exacerbation -continue Solu-Medrol and DuoNebs    6.  Type 2 diabetes mellitus -continue Accu-Cheks before every meal and nightly with sliding scale insulin    7.  Essential hypertension -currently well-controlled    8.  Hyperlipidemia -statin    Disposition probable discharge tomorrow    Arnulfo Carty,    03/06/25   18:02 EST

## 2025-03-07 ENCOUNTER — READMISSION MANAGEMENT (OUTPATIENT)
Dept: CALL CENTER | Facility: HOSPITAL | Age: 80
End: 2025-03-07
Payer: MEDICARE

## 2025-03-07 VITALS
HEIGHT: 70 IN | TEMPERATURE: 98 F | WEIGHT: 214.3 LBS | OXYGEN SATURATION: 97 % | HEART RATE: 85 BPM | SYSTOLIC BLOOD PRESSURE: 172 MMHG | DIASTOLIC BLOOD PRESSURE: 89 MMHG | BODY MASS INDEX: 30.68 KG/M2 | RESPIRATION RATE: 18 BRPM

## 2025-03-07 PROBLEM — J96.90 RESPIRATORY FAILURE: Status: RESOLVED | Noted: 2025-03-03 | Resolved: 2025-03-07

## 2025-03-07 LAB
ANION GAP SERPL CALCULATED.3IONS-SCNC: 8.2 MMOL/L (ref 5–15)
BUN SERPL-MCNC: 28 MG/DL (ref 8–23)
BUN/CREAT SERPL: 30.4 (ref 7–25)
CALCIUM SPEC-SCNC: 8.8 MG/DL (ref 8.6–10.5)
CHLORIDE SERPL-SCNC: 106 MMOL/L (ref 98–107)
CO2 SERPL-SCNC: 23.8 MMOL/L (ref 22–29)
CREAT SERPL-MCNC: 0.92 MG/DL (ref 0.57–1)
DEPRECATED RDW RBC AUTO: 42.5 FL (ref 37–54)
EGFRCR SERPLBLD CKD-EPI 2021: 63.9 ML/MIN/1.73
ERYTHROCYTE [DISTWIDTH] IN BLOOD BY AUTOMATED COUNT: 13.2 % (ref 12.3–15.4)
GLUCOSE BLDC GLUCOMTR-MCNC: 107 MG/DL (ref 70–130)
GLUCOSE BLDC GLUCOMTR-MCNC: 136 MG/DL (ref 70–130)
GLUCOSE BLDC GLUCOMTR-MCNC: 139 MG/DL (ref 70–130)
GLUCOSE BLDC GLUCOMTR-MCNC: 197 MG/DL (ref 70–130)
GLUCOSE BLDC GLUCOMTR-MCNC: 309 MG/DL (ref 70–130)
GLUCOSE SERPL-MCNC: 205 MG/DL (ref 65–99)
HCT VFR BLD AUTO: 35.6 % (ref 34–46.6)
HGB BLD-MCNC: 10.8 G/DL (ref 12–15.9)
MCH RBC QN AUTO: 26.7 PG (ref 26.6–33)
MCHC RBC AUTO-ENTMCNC: 30.3 G/DL (ref 31.5–35.7)
MCV RBC AUTO: 87.9 FL (ref 79–97)
PLATELET # BLD AUTO: 170 10*3/MM3 (ref 140–450)
PMV BLD AUTO: 11.2 FL (ref 6–12)
POTASSIUM SERPL-SCNC: 4 MMOL/L (ref 3.5–5.2)
RBC # BLD AUTO: 4.05 10*6/MM3 (ref 3.77–5.28)
SODIUM SERPL-SCNC: 138 MMOL/L (ref 136–145)
WBC NRBC COR # BLD AUTO: 6 10*3/MM3 (ref 3.4–10.8)

## 2025-03-07 PROCEDURE — 80048 BASIC METABOLIC PNL TOTAL CA: CPT | Performed by: INTERNAL MEDICINE

## 2025-03-07 PROCEDURE — 99239 HOSP IP/OBS DSCHRG MGMT >30: CPT | Performed by: INTERNAL MEDICINE

## 2025-03-07 PROCEDURE — 85027 COMPLETE CBC AUTOMATED: CPT | Performed by: INTERNAL MEDICINE

## 2025-03-07 PROCEDURE — 63710000001 PREDNISONE PER 1 MG: Performed by: INTERNAL MEDICINE

## 2025-03-07 PROCEDURE — 25010000002 CEFTRIAXONE PER 250 MG: Performed by: INTERNAL MEDICINE

## 2025-03-07 PROCEDURE — 94799 UNLISTED PULMONARY SVC/PX: CPT

## 2025-03-07 PROCEDURE — 97530 THERAPEUTIC ACTIVITIES: CPT

## 2025-03-07 PROCEDURE — 97110 THERAPEUTIC EXERCISES: CPT

## 2025-03-07 PROCEDURE — 94664 DEMO&/EVAL PT USE INHALER: CPT

## 2025-03-07 PROCEDURE — 94761 N-INVAS EAR/PLS OXIMETRY MLT: CPT

## 2025-03-07 PROCEDURE — 97116 GAIT TRAINING THERAPY: CPT

## 2025-03-07 PROCEDURE — 82948 REAGENT STRIP/BLOOD GLUCOSE: CPT

## 2025-03-07 RX ORDER — OSELTAMIVIR PHOSPHATE 30 MG/1
30 CAPSULE ORAL EVERY 12 HOURS SCHEDULED
Qty: 3 CAPSULE | Refills: 0 | Status: SHIPPED | OUTPATIENT
Start: 2025-03-07 | End: 2025-03-07

## 2025-03-07 RX ORDER — CEFDINIR 300 MG/1
300 CAPSULE ORAL 2 TIMES DAILY
Qty: 4 CAPSULE | Refills: 0 | Status: SHIPPED | OUTPATIENT
Start: 2025-03-07 | End: 2025-03-09

## 2025-03-07 RX ORDER — DOXYCYCLINE 100 MG/1
100 CAPSULE ORAL 2 TIMES DAILY
Qty: 4 CAPSULE | Refills: 0 | Status: SHIPPED | OUTPATIENT
Start: 2025-03-07 | End: 2025-03-09

## 2025-03-07 RX ORDER — PREDNISONE 10 MG/1
TABLET ORAL
Qty: 17 TABLET | Refills: 0 | Status: SHIPPED | OUTPATIENT
Start: 2025-03-07 | End: 2025-03-15

## 2025-03-07 RX ORDER — OSELTAMIVIR PHOSPHATE 75 MG/1
75 CAPSULE ORAL EVERY 12 HOURS SCHEDULED
Qty: 3 CAPSULE | Refills: 0 | Status: SHIPPED | OUTPATIENT
Start: 2025-03-07 | End: 2025-03-09

## 2025-03-07 RX ADMIN — CEFTRIAXONE SODIUM 2000 MG: 2 INJECTION, POWDER, FOR SOLUTION INTRAMUSCULAR; INTRAVENOUS at 08:46

## 2025-03-07 RX ADMIN — NYSTATIN 1 APPLICATION: 100000 POWDER TOPICAL at 11:38

## 2025-03-07 RX ADMIN — BUDESONIDE 0.5 MG: 0.5 INHALANT RESPIRATORY (INHALATION) at 08:08

## 2025-03-07 RX ADMIN — ACETAMINOPHEN 1000 MG: 500 TABLET, FILM COATED ORAL at 11:38

## 2025-03-07 RX ADMIN — LISINOPRIL 10 MG: 10 TABLET ORAL at 08:42

## 2025-03-07 RX ADMIN — OSELTAMIVIR PHOSPHATE 30 MG: 30 CAPSULE ORAL at 08:42

## 2025-03-07 RX ADMIN — APIXABAN 5 MG: 5 TABLET, FILM COATED ORAL at 08:42

## 2025-03-07 RX ADMIN — PREDNISONE 40 MG: 20 TABLET ORAL at 08:46

## 2025-03-07 RX ADMIN — IPRATROPIUM BROMIDE AND ALBUTEROL SULFATE 3 ML: 2.5; .5 SOLUTION RESPIRATORY (INHALATION) at 08:08

## 2025-03-07 RX ADMIN — BUDESONIDE 0.5 MG: 0.5 INHALANT RESPIRATORY (INHALATION) at 18:54

## 2025-03-07 RX ADMIN — ATORVASTATIN CALCIUM 20 MG: 20 TABLET, FILM COATED ORAL at 08:42

## 2025-03-07 RX ADMIN — IPRATROPIUM BROMIDE AND ALBUTEROL SULFATE 3 ML: 2.5; .5 SOLUTION RESPIRATORY (INHALATION) at 00:40

## 2025-03-07 RX ADMIN — PANTOPRAZOLE SODIUM 40 MG: 40 TABLET, DELAYED RELEASE ORAL at 05:49

## 2025-03-07 RX ADMIN — IPRATROPIUM BROMIDE AND ALBUTEROL SULFATE 3 ML: 2.5; .5 SOLUTION RESPIRATORY (INHALATION) at 18:54

## 2025-03-07 RX ADMIN — NYSTATIN 1 APPLICATION: 100000 POWDER TOPICAL at 08:43

## 2025-03-07 RX ADMIN — IPRATROPIUM BROMIDE AND ALBUTEROL SULFATE 3 ML: 2.5; .5 SOLUTION RESPIRATORY (INHALATION) at 14:02

## 2025-03-07 RX ADMIN — Medication 10 ML: at 08:43

## 2025-03-07 NOTE — DISCHARGE SUMMARY
[facility] HOSPITALIST MEDICINE DISCHARGE SUMMARY    Patient Identification:  Name:  Mayra Hays  Age:  79 y.o.  Sex:  female  :  1945  MRN:  [id number]  Visit Number:  [id number]    Date of Admission: 3/3/2025  Date of Discharge: 3/7/2025    PCP: Niki Antony APRN    DISCHARGE DIAGNOSIS   Acute hypercapnic respiratory failure  Chronic hypoxic respiratory failure  Right upper/right lower lobe pneumonia  Influenza A  COPD exacerbation  Type 2 diabetes mellitus  Essential hypertension  Hyperlipidemia      CONSULTS  None      PROCEDURES PERFORMED   None      HOSPITAL COURSE  Ms. Hays is a 79 y.o. female who presented to [facility] ED on 3/3/2025 with a chief complaint of shortness of breath.  Patient has a past medical history remarkable for COPD, chronic HFpEF, essential hypertension, hyperlipidemia and chronic A-fib.  Patient reported worsening shortness of breath for approximately 1 week prior to the evaluation in the emergency department.  Patient reports shortness of breath was worse with exertion and stated her symptoms felt very similar to when she had had pneumonia in the past.  For this reason, she presented to the emergency department for further treatment and evaluation.  Initial evaluation in the emergency department did consist of basic laboratory work as well as physical exam and vital signs.  Please see initial H&P for specific details.  After thorough evaluation, patient was diagnosed with acute influenza A with suspected superimposed right upper/right lower lobe bacterial pneumonia with COPD exacerbation.  Patient was admitted for further treatment and evaluation.    In regards to influenza A, patient was started on Tamiflu.  In regards to pneumonia, patient was started on cefepime and doxycycline which was eventually de-escalated to monotherapy Rocephin.  On date of discharge, patient had this further de-escalated to Omnicef and doxycycline.  Patient will  complete a full 5-day course of antibiotic therapy and 5-day course of antiviral therapy for influenza A after discharge.  Patient reports she feels back to her baseline functioning status and is eager to return home.  Patient is back to her 3 L nasal cannula and all lab values have returned to patient's baseline.  With this in mind, it is felt patient has reached maximum medical benefit of current hospitalization and will be discharged home in stable condition today.  The beforementioned plan was thoroughly discussed with the patient and she expressed understanding and willingness to proceed with the beforementioned plan.    VITAL SIGNS:      03/05/25  0500 03/06/25  0500 03/07/25  0500   Weight: 97.5 kg (215 lb) 97.2 kg (214 lb 3.2 oz) 97.2 kg (214 lb 4.8 oz)     Body mass index is 30.75 kg/m².    PHYSICAL EXAM:  Constitutional: Well-nourished  female in no apparent distress.     HENT:  Head:  Normocephalic and atraumatic.  Mouth:  Moist mucous membranes.    Eyes:  Conjunctivae and EOM are normal.  Pupils are equal, round, and reactive to light.  No scleral icterus.    Neck:  Neck supple. No thyromegaly.  No JVD present.    Cardiovascular:  Regular rate and rhythm with no murmurs, rubs, clicks or gallops appreciated.  Pulmonary/Chest: Decreased breath sounds bilaterally with no wheezes or rhonchi appreciated  Abdominal:  Soft. Nontender. Nondistended  Bowel sounds are normal in all four quadrants. No organomegally appreciated.   Musculoskeletal:  5/5 muscle strength bilateral upper and lower extremities with equal muscle tone and bulk. No edema, no tenderness, and no deformity.  No red or swollen joints anywhere.    Neurological:  Alert, Cranial nerves II-XII intact with no focal deficits.  No facial droop.  No slurred speech.   Skin:  Warm and dry to palpation with no rashes or lesions appreciated.  Peripheral vascular:  2+ radial and pedal pulses in bilateral upper and lower extremities.  Psychiatric:   Alert and oriented x3, demonstrates appropriate judgment and insight.    DISCHARGE DISPOSITION   Stable    DISCHARGE MEDICATIONS:     Discharge Medications        New Medications        Instructions Start Date   cefdinir 300 MG capsule  Commonly known as: OMNICEF   300 mg, Oral, 2 Times Daily      doxycycline 100 MG capsule  Commonly known as: VIBRAMYCIN   100 mg, Oral, 2 Times Daily      oseltamivir 30 MG capsule  Commonly known as: TAMIFLU   30 mg, Oral, Every 12 Hours Scheduled      predniSONE 10 MG tablet  Commonly known as: DELTASONE   Take 4 tablets by mouth Daily for 2 days, THEN 2 tablets Daily for 3 days, THEN 1 tablet Daily for 3 days.   Start Date: March 7, 2025            Continue These Medications        Instructions Start Date   albuterol sulfate  (90 Base) MCG/ACT inhaler  Commonly known as: PROVENTIL HFA;VENTOLIN HFA;PROAIR HFA   Inhale 2 puffs by mouth Every 4 (Four) Hours As Needed for Wheezing.      apixaban 5 MG tablet tablet  Commonly known as: ELIQUIS   5 mg, Oral, Every 12 Hours Scheduled      atorvastatin 20 MG tablet  Commonly known as: LIPITOR   1 tablet, Oral, Daily      Breztri Aerosphere 160-9-4.8 MCG/ACT aerosol inhaler  Generic drug: Budeson-Glycopyrrol-Formoterol   Inhale 2 puffs by mouth 2 (Two) Times a Day.      esomeprazole 20 MG capsule  Commonly known as: nexIUM   20 mg, Oral, Daily      hydrOXYzine pamoate 25 MG capsule  Commonly known as: VISTARIL   25 mg, Oral, Nightly      ipratropium-albuterol 0.5-2.5 mg/3 ml nebulizer  Commonly known as: DUO-NEB   3 mL, Nebulization, Every 4 Hours PRN      lidocaine-prilocaine 2.5-2.5 % cream  Commonly known as: EMLA   1 Application, Topical, As Needed      lisinopril 10 MG tablet  Commonly known as: PRINIVIL,ZESTRIL   10 mg, Oral, Daily      metFORMIN 500 MG tablet  Commonly known as: GLUCOPHAGE   500 mg, Oral, 2 Times Daily PRN, As needed      nystatin 447143 UNIT/GM powder  Commonly known as: MYCOSTATIN   1 Application, Topical, 4  Times Daily                 Your Scheduled Appointments      Mar 14, 2025 10:00 AM  New Patient with ERASTO Vazquez  [facility] ([place]) [address] [facility] [address] [phone]   Please arrive 30 mins prior to your scheduled appointment time. Additionally, bring any of the following:  Photo ID  Insurance cards  Medications and supplements (in the original bottles)  Any payment you may have (co-pay, co-insurance, pre-pay)  Medical records from your previous primary care provider          Mar 20, 2025 11:00 AM  CT cor chest w contrast with COR CT 1  [facility] CT ([place]) [address] [facility] [address] [phone]   NPO 6 hrs  If a patient is allergic to the contrast/iodine products, they MUST be premedicated prior to their exam. The ordering provider of said exam will provide the patient with pre-medication and instructions. If allergic and not premedicated, exam will be rescheduled.         Mar 27, 2025 4:15 PM  VAD FLUSH with  COR OP INFUS CHAIR 27  [facility] OUTPATIENT INFUSION ([place]) [address] [facility] [address] [phone]        Apr 09, 2025 1:00 PM  Follow Up with  COR PULMONARY CLIN  [facility] P[facility] CL[facility]IC (--) [address] [facility] [address] [phone]   Arrive 15 minutes prior to appointment.         May 06, 2025 11:00 AM  Follow Up with Darek Nelson PA-C  [facility] CARDIOLOG[facility] ([place]) [address]  [facility] [id number] [phone]   -Bring photo ID, insurance card, and list of medications to appointment  -If testing was completed outside of [facility] then patient must bring images on a disc  -Copay will be collected at time of appointment  -Established patients should arrive 10 minutes prior to appointment         May 07, 2025 10:00 AM  Follow Up with ERASTO Mckeon  [facility] PULMONARY [facility] CRIT[facility] CARE [facility]ICINE  ([place]) [address]  [facility] [address]  [phone]   Established: Please bring outside images or reports.         May 15, 2025 11:00 AM  LAB with ARNOLD NURSE LAB  [facility] HEMATOLOGY & ONCOLOGY ([place]) [address]  [facility] [address] [phone]   Bring ID and  Insurance cards.  If you received paperwork in the mail, fill it out and bring it with them.         May 15, 2025 11:30 AM  FOLLOW UP with Shawna Bond MD  [facility] HEMATOLOGY & ONCOLOGY ([place]) [address] [facility] [address]  [facility] [address] [phone]   Bring ID and  Insurance cards.  New patients are to arrive 30 minutes prior to the appointement.  If you received paperwork in the mail, fill it out and bring it with them.  All other appt's need to be here 15 minutes early.                  Follow-up Information       Michelle Dickens APRN Follow up on 3/14/2025.    Specialty: Family Medicine  Why: @ 10:00AM  Contact information:  [address] [facility] [address] [phone]               Niki Antony APRN .    Specialty: Nurse Practitioner  Contact information:  [address]  [facility] [address] [phone]                             TEST  RESULTS PENDING AT DISCHARGE       Arnulfo Carty,   03/07/25  17:57 EST    Please note that this discharge summary required more than 30 minutes to complete.    Please send a copy of this dictation to the following providers:  Niki Antony APRN

## 2025-03-07 NOTE — PLAN OF CARE
Goal Outcome Evaluation: Patient complaint with care this shift. Patient on 3 L NC, saturations remaining above 90%. No acute s/s of distress noted. Patient resting in bed with call light in reach. Plan of care ongoing.

## 2025-03-07 NOTE — PLAN OF CARE
Goal Outcome Evaluation:                         Pt to be discharged at this time

## 2025-03-07 NOTE — NURSING NOTE
Notified [facility] about getting a portable oxygen tank. Stated they will let on call person know. Pt being discharged home on baseline oxygen 2L/NC. Sincere RAMIREZ made aware.

## 2025-03-07 NOTE — CONSULTS
COPD Education  Encounter    NAME:___Mayra Hays  :_1945_    Ms. Hays is a past patient in the COPD Clinic. Her last appointment was 9/3/2024.   She also has followed with ERASTO Bowden, [facility]. That last appointment was: 2024.  He next appointment in the COPD Clinic was on 3/4/2025 but due to this hospital admission, she had to reschedule. Her next follow up in the clinic will be 2025.    COPD Education Evaluation            COPD Education Completed (See Note) Yes     Referring/consulting Provider: Dr. Carty     Reason for Consultation:  COPD Exacerbation     Objective:    Home Medications:  Medications Prior to Admission   Medication Sig Dispense Refill Last Dose/Taking    albuterol sulfate  (90 Base) MCG/ACT inhaler Inhale 2 puffs by mouth Every 4 (Four) Hours As Needed for Wheezing. 18 g 6 Unknown    apixaban (ELIQUIS) 5 MG tablet tablet Take 1 tablet by mouth Every 12 (Twelve) Hours. 180 tablet 3 Unknown    atorvastatin (LIPITOR) 20 MG tablet Take 1 tablet by mouth Daily.   Unknown    Budeson-Glycopyrrol-Formoterol (Breztri Aerosphere) 160-9-4.8 MCG/ACT aerosol inhaler Inhale 2 puffs by mouth 2 (Two) Times a Day. 32.1 g 3 Unknown    esomeprazole (nexIUM) 20 MG capsule Take 1 capsule by mouth Daily.   Unknown    hydrOXYzine pamoate (VISTARIL) 25 MG capsule Take 1 capsule by mouth Every Night.   Unknown    ipratropium-albuterol (DUO-NEB) 0.5-2.5 mg/3 ml nebulizer Take 3 mL by nebulization Every 4 (Four) Hours As Needed (Breathing).   Unknown    lidocaine-prilocaine (EMLA) 2.5-2.5 % cream Apply 1 Application topically to the appropriate area as directed As Needed for Mild Pain. 5 g 3 Unknown    lisinopril (PRINIVIL,ZESTRIL) 10 MG tablet Take 1 tablet by mouth Daily. 30 tablet 2 Unknown    metFORMIN (GLUCOPHAGE) 500 MG tablet Take 1 tablet by mouth 2 (Two) Times a Day As Needed (only takes if blood glucose is above 200). As needed   Unknown    nystatin (MYCOSTATIN)  "348129 UNIT/GM powder Apply 1 Application topically to the appropriate area as directed 4 (Four) Times a Day.   Unknown     Barriers to Learning? No    COPD education given via the booklet \"A Patient's Guide to COPD\".     COPD Zones: (Green/yellow/Red): YES     Exacerbation or Flare up signs and symptoms: YES     Causes of COPD Exacerbation:      Lung Infection YES     Indoor and Outdoor Irratants YES     COPD Medication Non-Compliance YES     Healthy eating and drinking habbits: YES     Exercise and Activity: YES     Managing Medications: YES and We discussed benefits of Rescue and Maintenance medications     Patient understands use of Rescue Medications: YES and Albuterol MDI, Duoneb nebulizer solution. She is using both at home       Patient understands use of Maintenance Medications: YES and Breztri MDI. She states she is taking as directed       Proper MDI technique (w/wo Spacer): YESDiscussed proper inhaler technique in detail for the inhaler the patient has. Examples such as emptying out of the lungs before taking a puff to get the medicine further into the lung, proper timing on when to activate each puff when inhaling and to try to hold one's breath for 10 seconds after inhaling each puff.       How to use a nebulizer: YESDiscussed with patient the proper way to use a nebulizer. How to properly clean a nebulizer and how often to due so. We also discussed benefits of using prescribed nebulizer solution.      How to clean a nebulizer: YES     Breathing Techniques:       Purse-lipped breathing YES     Oxygen therapy SAFETY:  YES       Action Plan            COPD/Lung Health Clinic follow up scheduled: YES and 4/9/2025     Education Minutes with patient: YES and 20 minutes       Goals Discussed with patient: Keep home smoke free., Take medications as ordered., GO to DrChristal appointments., Increase activity., Eat healthier., Increase use of pursed lip breathing., Decrease flare-ups., and Increase COPD " Knowledge.      Comments:     Ms. Hays and I reviewed the education topics listed above.     Thank you for allowing me to participate in her care,      CHELSI Delgado, RRT  COPD Navigator  03/07/25  12:25 EST

## 2025-03-07 NOTE — THERAPY TREATMENT NOTE
Acute Care - Physical Therapy Treatment Note   [place]     Patient Name: Mayra Hays  : 1945  MRN: [id number]  Today's Date: 3/7/2025      Visit Dx:     ICD-10-CM ICD-9-CM   1. Pneumonia of right upper lobe due to infectious organism  J18.9 486   2. Pneumonia of left lower lobe due to infectious organism  J18.9 486   3. Respiratory acidosis  E87.29 276.2     Patient Active Problem List   Diagnosis    Chronic heart failure with preserved ejection fraction    Essential hypertension    Type 2 diabetes mellitus    Abnormal nuclear stress test with moderate to large size anteroapical and lateral wall myocardial ischemia.    Paroxysmal atrial fibrillation    Nocturnal hypoxia    Ventral hernia without obstruction or gangrene    Chronic obstructive pulmonary disease    Former smoker    Gait instability    Chronic respiratory failure with hypoxia    Cigarette nicotine dependence in remission    Acute anemia    Iron deficiency anemia    Malabsorption due to intolerance, not elsewhere classified    Lung mass    Other specified anemias    Primary cancer of right upper lobe of lung    Non-small cell cancer of right lung    Port-A-Cath in place    Hyperlipidemia LDL goal <100    Sepsis due to pneumonia    Respiratory failure     Past Medical History:   Diagnosis Date    Arthritis     CHF (congestive heart failure)     Chronic anticoagulation     Eliquis    Chronic kidney disease     stage II/IIIa    Collapsed lung     COPD (chronic obstructive pulmonary disease)     Diabetes mellitus     TYPE 2    Elevated cholesterol     GERD (gastroesophageal reflux disease)     Hyperlipidemia     Hypertension     Non-small cell lung cancer RUL     Sleep apnea     non compliant with CPAP    Stroke 2019    Wears eyeglasses      Past Surgical History:   Procedure Laterality Date    BRONCHOSCOPY Bilateral 2023    Procedure: BRONCHOSCOPY WITH ENDOBRONCHIAL ULTRASOUND;  Surgeon: Abdulkadir Peter MD;  Location: [facility];  Service:  Pulmonary;  Laterality: Bilateral;    BRONCHOSCOPY WITH ION ROBOTIC ASSIST N/A 9/6/2023    Procedure: BRONCHOSCOPY WITH ION ROBOT AND EBUS;  Surgeon: John Clemens MD;  Location:  [facility];  Service: Robotics - Pulmonary;  Laterality: N/A;  Ion cath #6 - 0032,  #6 - 0030, cath guide # 0077. EBUS scope removed with balloon intact.    CARDIAC CATHETERIZATION N/A 08/22/2017    Procedure: Left Heart Cath;  Surgeon: Jatinder Matias MD;  Location:  [facility] CATH INVASIVE LOCATION;  Service:     CARDIAC CATHETERIZATION N/A 11/22/2021    Procedure: Left Heart Cath;  Surgeon: Tolu Steinberg MD;  Location:  [facility] CATH INVASIVE LOCATION;  Service: Cardiology;  Laterality: N/A;    COLONOSCOPY      ENDOSCOPY      GALLBLADDER SURGERY      PORTACATH PLACEMENT N/A 9/29/2023    Procedure: INSERTION OF PORTACATH;  Surgeon: Petr Velásqeuz MD;  Location:  [facility] OR;  Service: General;  Laterality: N/A;    VENTRAL HERNIA REPAIR N/A 05/14/2020    Procedure: VENTRAL HERNIA REPAIR LAPAROSCOPIC WITH DAVINCI ROBOT;  Surgeon: Petr Velásquez MD;  Location:  [facility] OR;  Service: DaVinci;  Laterality: N/A;     PT Assessment (Last 12 Hours)       PT Evaluation and Treatment       Row Name 03/07/25 1308          Physical Therapy Time and Intention    Subjective Information no complaints  -     Document Type therapy note (daily note)  -HC     Mode of Treatment individual therapy;physical therapy  -HC     Patient Effort good  -HC     Comment Pt and RN in agreement for PT. Pt walked 12' x 2 in room once with RW and one with HHA, CGA. EOB exercises to tolerance. Pt reports she is feeling much better.  -HC       Row Name 03/07/25 1307          General Information    Patient Profile Reviewed yes  -HC     Existing Precautions/Restrictions fall;oxygen therapy device and L/min  -HC       Row Name 03/07/25 1305          Living Environment    Primary Care Provided by self  -HC       Row Name 03/07/25 1305          Home Use of  Assistive/Adaptive Equipment    Equipment Currently Used at Home oxygen;nebulizer  -       Row Name 03/07/25 1305          Cognition    Affect/Mental Status (Cognition) WFL  -     Orientation Status (Cognition) oriented to;person;place  -     Follows Commands (Cognition) follows one-step commands  -     Personal Safety Interventions fall prevention program maintained;gait belt;nonskid shoes/slippers when out of bed;supervised activity  -       Row Name 03/07/25 1305          Bed Mobility    Bed Mobility bed mobility (all) activities  -     All Activities, [place] (Bed Mobility) contact guard;standby assist  -       Row Name 03/07/25 1305          Transfers    Transfers sit-stand transfer;stand-sit transfer  -       Row Name 03/07/25 1305          Sit-Stand Transfer    Sit-Stand [place] (Transfers) contact guard;standby assist  -       Row Name 03/07/25 1305          Stand-Sit Transfer    Stand-Sit [place] (Transfers) contact guard;standby assist  -       Row Name 03/07/25 1305          Gait/Stairs (Locomotion)    Gait/Stairs Locomotion gait/ambulation independence;distance ambulated  -     [place] Level (Gait) contact guard  -     Assistive Device (Gait) walker, front-wheeled;other (see comments)  HHA  -     Patient was able to Ambulate yes  -     Distance in Feet (Gait) 12  x 2  -       Row Name 03/07/25 1305          Safety Issues/Impairments Affecting Functional Mobility    Impairments Affecting Function (Mobility) balance;endurance/activity tolerance  -       Row Name 03/07/25 1305          Motor Skills    Therapeutic Exercise other (see comments)  Sitting: AP, LAQ, March, knees in/out  -       Row Name 03/07/25 1305          Positioning and Restraints    Pre-Treatment Position in bed  -     Post Treatment Position bed  -HC     In Bed sitting EOB;call light within reach;encouraged to call for assist;exit alarm on  -       Row Name 03/07/25 1305           Therapy Assessment/Plan (PT)    Rehab Potential (PT) fair  -HC     Criteria for Skilled Interventions Met (PT) yes;skilled treatment is necessary  -HC     Therapy Frequency (PT) 2 times/wk  2-5x/wk  -HC     Problem List (PT) problems related to;balance;mobility  -HC     Activity Limitations Related to Problem List (PT) unable to ambulate safely;unable to transfer safely  -HC       Row Name 03/07/25 1308          Physical Therapy Goals    Transfer Goal Selection (PT) transfer, PT goal 1  -HC     Gait Training Goal Selection (PT) gait training, PT goal 1  -HC       Row Name 03/07/25 1309          Transfer Goal 1 (PT)    Activity/Assistive Device (Transfer Goal 1, PT) transfers, all  -HC     [place] Level/Cues Needed (Transfer Goal 1, PT) independent  -HC     Time Frame (Transfer Goal 1, PT) by discharge  -HC       Row Name 03/07/25 3413          Gait Training Goal 1 (PT)    Activity/Assistive Device (Gait Training Goal 1, PT) gait (walking locomotion)  -HC     [place] Level (Gait Training Goal 1, PT) independent  -HC     Distance (Gait Training Goal 1, PT) 100'  -HC     Time Frame (Gait Training Goal 1, PT) by discharge  -HC               User Key  (r) = Recorded By, (t) = Taken By, (c) = Cosigned By      Initials Name Provider Type    Vaishali Pickett, [employer] Physical Therapist Assistant                    Physical Therapy Education       Title: PT OT SLP Therapies (In Progress)       Topic: Physical Therapy (In Progress)       Point: Mobility training (In Progress)       Learning Progress Summary            Patient Acceptance, E, NR by AM at 3/7/2025 0047    Acceptance, E,TB, VU by KM at 3/5/2025 1521                      Point: Home exercise program (In Progress)       Learning Progress Summary            Patient Acceptance, E, NR by AM at 3/7/2025 0047    Acceptance, E,TB, VU by KM at 3/5/2025 1521                      Point: Body mechanics (In Progress)       Learning Progress Summary             Patient Acceptance, E, NR by AM at 3/7/2025 0047    Acceptance, E,TB, VU by  at 3/5/2025 1521                      Point: Precautions (In Progress)       Learning Progress Summary            Patient Acceptance, E, NR by AM at 3/7/2025 0047    Acceptance, E,TB, VU by  at 3/5/2025 1521                                      User Key       Initials Effective Dates Name Provider Type Discipline     05/24/22 -  Fran Najera, PT Physical Therapist PT    AM 01/02/25 -  Suzanne Hays RNA Registered Nurse Nurse                  PT Recommendation and Plan  Anticipated Discharge Disposition (PT): home with assist  Therapy Frequency (PT): 2 times/wk (2-5x/wk)          Time Calculation:    PT Charges       Row Name 03/07/25 1317             Time Calculation    PT Received On 03/07/25  -HC         Time Calculation- PT    Total Timed Code Minutes- PT 39 minute(s)  -HC                User Key  (r) = Recorded By, (t) = Taken By, (c) = Cosigned By      Initials Name Provider Type     Vaishali Mcdonald, PTA Physical Therapist Assistant                  Therapy Charges for Today       Code Description Service Date Service Provider Modifiers Qty    91076183898 HC PT THER PROC EA 15 MIN 3/6/2025 Vaishali Mcdonald PTA GP, CQ 1    33325193273 HC PT THERAPEUTIC ACT EA 15 MIN 3/6/2025 Vaishali Mcdonald PTA GP, CQ 1    08489253621 HC GAIT TRAINING EA 15 MIN 3/7/2025 Vaishali Mcdonald PTA GP, CQ 1    80284484419 HC PT THER PROC EA 15 MIN 3/7/2025 Vaishali Mcdonald PTA GP, CQ 1    65274784602 HC PT THERAPEUTIC ACT EA 15 MIN 3/7/2025 Vaishali Mcdonald, [employer] GP, CQ 1            PT G-Codes  AM-PAC 6 Clicks Score (PT): 21    Vaishali Mcdonald PTA  3/7/2025

## 2025-03-07 NOTE — CASE MANAGEMENT/SOCIAL WORK
Discharge Planning Assessment  [facility]     Patient Name: Mayra Hays  MRN: [id number]  Today's Date: 3/7/2025    Admit Date: 3/3/2025    Plan: Pt was admitted on 3/3/25. Pt lives alone. PCP is Niki Antony. Pt does not utilize home health services at this time. Pt had a referral sent to [facility] but was not admitted. PT has oxygen @ 2L and nebulizer via [employer]. Pt has POA (on file). Pt to return home at discharge with daughter to provide transportation. SS to follow.       Discharge Plan       Row Name 03/07/25 1656       Plan    Plan Pt was admitted on 3/3/25. Pt lives alone. PCP is Niki Antony. Pt does not utilize home health services at this time. Pt had a referral sent to [facility] but was not admitted. PT has oxygen @ 2L and nebulizer via [employer]. Pt has POA (on file). Pt to return home at discharge with daughter to provide transportation. SS to follow.        Continued Care and Services - Admitted Since 3/3/2025    No active coordination exists for this encounter.       Expected Discharge Date and Time       Expected Discharge Date Expected Discharge Time    Mar 7, 2025               DIANE Bhakta

## 2025-03-08 NOTE — OUTREACH NOTE
Prep Survey      Flowsheet Row Responses   [facility] patient discharged from? [place]   Is LACE score < 7 ? No   Eligibility [facility]   Date of Admission 03/03/25   Date of Discharge 03/07/25   Discharge Disposition Home or Self Care   Discharge diagnosis Acute hypercapnic Respiratory failure, PNA, Influenza A   Does the patient have one of the following disease processes/diagnoses(primary or secondary)? Pneumonia   Does the patient have Home health ordered? No   Is there a DME ordered? Yes   What DME was ordered? has home O2   Comments regarding appointments new PCP appt   Prep survey completed? Yes            Tami TSAI - Registered Nurse

## 2025-03-10 ENCOUNTER — TELEPHONE (OUTPATIENT)
Dept: TELEMETRY | Facility: HOSPITAL | Age: 80
End: 2025-03-10
Payer: MEDICARE

## 2025-03-10 ENCOUNTER — TRANSITIONAL CARE MANAGEMENT TELEPHONE ENCOUNTER (OUTPATIENT)
Dept: CALL CENTER | Facility: HOSPITAL | Age: 80
End: 2025-03-10
Payer: MEDICARE

## 2025-03-10 NOTE — OUTREACH NOTE
Call Center TCM Note      Flowsheet Row Responses   [facility] patient discharged from? [place]   Does the patient have one of the following disease processes/diagnoses(primary or secondary)? Pneumonia   TCM attempt successful? No   Unsuccessful attempts Attempt 1   Revoked Reason Other  [no phone number listed for patient, only family members. NEW PATIENT appt, no verbal release on file.]   Call Status Comments NEW PATIENT PCP APPT on 3/14/25 at 10:00 AM with ERASTO Vasquez.            Zakiya Dang RN    3/10/2025, 11:24 EDT            Zakiya BRANCH - Registered Nurse

## 2025-03-14 ENCOUNTER — DOCUMENTATION (OUTPATIENT)
Dept: ONCOLOGY | Facility: CLINIC | Age: 80
End: 2025-03-14
Payer: MEDICARE

## 2025-03-14 NOTE — PROGRESS NOTES
SS received call from patient [phone] requesting travel assistance.     SS to research travel assistance programs.    SS will follow up with patient.

## 2025-03-19 ENCOUNTER — DOCUMENTATION (OUTPATIENT)
Dept: ONCOLOGY | Facility: CLINIC | Age: 80
End: 2025-03-19
Payer: MEDICARE

## 2025-03-20 ENCOUNTER — HOSPITAL ENCOUNTER (OUTPATIENT)
Dept: CT IMAGING | Facility: HOSPITAL | Age: 80
Discharge: HOME | End: 2025-03-20
Admitting: INTERNAL MEDICINE
Payer: MEDICARE

## 2025-03-20 DIAGNOSIS — C34.91 NON-SMALL CELL CANCER OF RIGHT LUNG: ICD-10-CM

## 2025-03-20 PROCEDURE — 25510000001 IOPAMIDOL 61 % SOLUTION: Performed by: INTERNAL MEDICINE

## 2025-03-20 PROCEDURE — 71260 CT THORAX DX C+: CPT

## 2025-03-20 RX ORDER — IOPAMIDOL 612 MG/ML
100 INJECTION, SOLUTION INTRAVASCULAR
Status: COMPLETED | OUTPATIENT
Start: 2025-03-20 | End: 2025-03-20

## 2025-03-20 RX ADMIN — IOPAMIDOL 85 ML: 612 INJECTION, SOLUTION INTRAVENOUS at 11:35

## 2025-03-21 NOTE — PROGRESS NOTES
SS received call from patient requesting follow-up on resources.    SS made patient aware that SS assisting with resources as available.    SS will follow and assist as needed.

## 2025-03-24 ENCOUNTER — DOCUMENTATION (OUTPATIENT)
Dept: ONCOLOGY | Facility: CLINIC | Age: 80
End: 2025-03-24
Payer: MEDICARE

## 2025-03-24 NOTE — PROGRESS NOTES
SS received call from patient [phone] this date regarding resources. Patient verbalized that she has appointment on Thursday, March 27 and needs assistance.  SS completed application for [employer] and submitted to [employer]. SS faxed copy to Health Information.     SS attempted to contact patient's daughter Hailey Quinonez/HERMINIO [phone], per patient request to make aware of [employer] application. SS left VM with return contact information.    SS received a second call from patient this date, asking if SS had contacted daughter. SS made patient aware that SS attempted to contact without success, and that SS left daughter voicemail with return contact information.    SS will continue to follow and assist the patient as needed. Ongoing support will be provided to address any emerging concerns or needs.

## 2025-03-27 ENCOUNTER — INFUSION (OUTPATIENT)
Dept: ONCOLOGY | Facility: HOSPITAL | Age: 80
End: 2025-03-27
Payer: MEDICARE

## 2025-03-27 VITALS
HEART RATE: 76 BPM | OXYGEN SATURATION: 99 % | DIASTOLIC BLOOD PRESSURE: 71 MMHG | TEMPERATURE: 98.4 F | SYSTOLIC BLOOD PRESSURE: 138 MMHG | RESPIRATION RATE: 18 BRPM

## 2025-03-27 DIAGNOSIS — Z95.828 PORT-A-CATH IN PLACE: Primary | ICD-10-CM

## 2025-03-27 PROCEDURE — 25010000002 HEPARIN LOCK FLUSH PER 10 UNITS: Performed by: NURSE PRACTITIONER

## 2025-03-27 PROCEDURE — 96523 IRRIG DRUG DELIVERY DEVICE: CPT

## 2025-03-27 RX ORDER — HEPARIN SODIUM (PORCINE) LOCK FLUSH IV SOLN 100 UNIT/ML 100 UNIT/ML
500 SOLUTION INTRAVENOUS AS NEEDED
Status: DISCONTINUED | OUTPATIENT
Start: 2025-03-27 | End: 2025-03-27 | Stop reason: HOSPADM

## 2025-03-27 RX ORDER — HEPARIN SODIUM (PORCINE) LOCK FLUSH IV SOLN 100 UNIT/ML 100 UNIT/ML
300 SOLUTION INTRAVENOUS ONCE
OUTPATIENT
Start: 2025-03-27

## 2025-03-27 RX ORDER — SODIUM CHLORIDE 0.9 % (FLUSH) 0.9 %
10 SYRINGE (ML) INJECTION AS NEEDED
Status: DISCONTINUED | OUTPATIENT
Start: 2025-03-27 | End: 2025-03-27 | Stop reason: HOSPADM

## 2025-03-27 RX ORDER — SODIUM CHLORIDE 0.9 % (FLUSH) 0.9 %
20 SYRINGE (ML) INJECTION AS NEEDED
OUTPATIENT
Start: 2025-03-27

## 2025-03-27 RX ORDER — HEPARIN SODIUM (PORCINE) LOCK FLUSH IV SOLN 100 UNIT/ML 100 UNIT/ML
500 SOLUTION INTRAVENOUS AS NEEDED
OUTPATIENT
Start: 2025-03-27

## 2025-03-27 RX ORDER — SODIUM CHLORIDE 0.9 % (FLUSH) 0.9 %
10 SYRINGE (ML) INJECTION AS NEEDED
OUTPATIENT
Start: 2025-03-27

## 2025-03-27 RX ADMIN — Medication 10 ML: at 16:53

## 2025-03-27 RX ADMIN — Medication 500 UNITS: at 16:53

## 2025-04-09 ENCOUNTER — HOSPITAL ENCOUNTER (OUTPATIENT)
Dept: PULMONOLOGY | Facility: HOSPITAL | Age: 80
Discharge: HOME | End: 2025-04-09
Payer: MEDICARE

## 2025-04-09 VITALS
OXYGEN SATURATION: 96 % | WEIGHT: 219.6 LBS | BODY MASS INDEX: 31.51 KG/M2 | HEART RATE: 66 BPM | DIASTOLIC BLOOD PRESSURE: 78 MMHG | SYSTOLIC BLOOD PRESSURE: 152 MMHG

## 2025-04-09 DIAGNOSIS — C34.91 NON-SMALL CELL CANCER OF RIGHT LUNG: ICD-10-CM

## 2025-04-09 DIAGNOSIS — Z87.891 FORMER SMOKER: ICD-10-CM

## 2025-04-09 DIAGNOSIS — J96.11 CHRONIC RESPIRATORY FAILURE WITH HYPOXIA AND HYPERCAPNIA: ICD-10-CM

## 2025-04-09 DIAGNOSIS — J44.9 CHRONIC OBSTRUCTIVE PULMONARY DISEASE, UNSPECIFIED COPD TYPE: Primary | ICD-10-CM

## 2025-04-09 DIAGNOSIS — J96.12 CHRONIC RESPIRATORY FAILURE WITH HYPOXIA AND HYPERCAPNIA: ICD-10-CM

## 2025-04-09 DIAGNOSIS — J44.9 CHRONIC OBSTRUCTIVE PULMONARY DISEASE, UNSPECIFIED COPD TYPE: ICD-10-CM

## 2025-04-09 PROCEDURE — 94664 DEMO&/EVAL PT USE INHALER: CPT

## 2025-04-09 PROCEDURE — 94618 PULMONARY STRESS TESTING: CPT

## 2025-04-09 RX ORDER — BUDESONIDE, GLYCOPYRROLATE, AND FORMOTEROL FUMARATE 160; 9; 4.8 UG/1; UG/1; UG/1
2 AEROSOL, METERED RESPIRATORY (INHALATION) 2 TIMES DAILY
Qty: 32.1 G | Refills: 3 | Status: SHIPPED | OUTPATIENT
Start: 2025-04-09

## 2025-04-09 RX ORDER — IPRATROPIUM BROMIDE AND ALBUTEROL SULFATE 2.5; .5 MG/3ML; MG/3ML
3 SOLUTION RESPIRATORY (INHALATION) EVERY 4 HOURS PRN
Qty: 360 ML | Refills: 3 | Status: SHIPPED | OUTPATIENT
Start: 2025-04-09

## 2025-04-09 RX ORDER — ALBUTEROL SULFATE 90 UG/1
2 INHALANT RESPIRATORY (INHALATION) EVERY 4 HOURS PRN
Qty: 18 G | Refills: 3 | Status: SHIPPED | OUTPATIENT
Start: 2025-04-09

## 2025-04-09 RX ORDER — GUAIFENESIN 600 MG/1
1200 TABLET, EXTENDED RELEASE ORAL 2 TIMES DAILY PRN
Qty: 120 TABLET | Refills: 3 | Status: SHIPPED | OUTPATIENT
Start: 2025-04-09

## 2025-04-09 NOTE — PROGRESS NOTES
[facility]  COPD Management       Mayra Hays is a 79 y.o. female seen in the [facility] for COPD. Current maintenance regimen includes Breztri, PRN albuterol HFA, and PRN Duonebs. Patient is a former smoker that quit about 5-6 years ago. Of note, patient does have non-small cell lung cancer and is presently getting chemotherapy.     Patient has a poor understanding of her medications and is not sure what she takes on a daily basis. She is accompanied by her daughter today that helps with her medications. Patient reports that she is doing well with Breztri and is rinsing her mouth out following each use. She does get short of breath when walking long distances. She reports using her albuterol HFA 2-3x/day and Duonebs 2-3x/day. Patient does report that she feels congested and has been coughing up white sputum.      Past Medical History:   Diagnosis Date    Arthritis     CHF (congestive heart failure)     Chronic anticoagulation     Eliquis    Chronic kidney disease     stage II/IIIa    Collapsed lung     COPD (chronic obstructive pulmonary disease)     Diabetes mellitus     TYPE 2    Elevated cholesterol     GERD (gastroesophageal reflux disease)     Hyperlipidemia     Hypertension     Non-small cell lung cancer RUL     Sleep apnea     non compliant with CPAP    Stroke 2019    Wears eyeglasses      Social History     Socioeconomic History    Marital status: Single    Number of children: 6   Tobacco Use    Smoking status: Former     Current packs/day: 0.00     Average packs/day: 3.0 packs/day for 16.7 years (50.2 ttl pk-yrs)     Types: Cigarettes     Start date: 4/3/1998     Quit date: 1/1/2015     Years since quitting: 10.2     Passive exposure: Past    Smokeless tobacco: Never   Vaping Use    Vaping status: Never Used   Substance and Sexual Activity    Alcohol use: No    Drug use: No    Sexual activity: Never     Paclitaxel    Current Outpatient Medications:     albuterol  sulfate  (90 Base) MCG/ACT inhaler, Inhale 2 puffs by mouth Every 4 (Four) Hours As Needed for Wheezing., Disp: 18 g, Rfl: 3    apixaban (ELIQUIS) 5 MG tablet tablet, Take 1 tablet by mouth Every 12 (Twelve) Hours., Disp: 180 tablet, Rfl: 3    atorvastatin (LIPITOR) 20 MG tablet, Take 1 tablet by mouth Daily., Disp: , Rfl:     Budeson-Glycopyrrol-Formoterol (Breztri Aerosphere) 160-9-4.8 MCG/ACT aerosol inhaler, Inhale 2 puffs by mouth 2 (Two) Times a Day., Disp: 32.1 g, Rfl: 3    esomeprazole (nexIUM) 20 MG capsule, Take 1 capsule by mouth Daily., Disp: , Rfl:     hydrOXYzine pamoate (VISTARIL) 25 MG capsule, Take 1 capsule by mouth Every Night., Disp: , Rfl:     ipratropium-albuterol (DUO-NEB) 0.5-2.5 mg/3 ml nebulizer, Take 3 mL by nebulization Every 4 (Four) Hours As Needed (Breathing)., Disp: 360 mL, Rfl: 3    lidocaine-prilocaine (EMLA) 2.5-2.5 % cream, Apply 1 Application topically to the appropriate area as directed As Needed for Mild Pain., Disp: 5 g, Rfl: 3    lisinopril (PRINIVIL,ZESTRIL) 10 MG tablet, Take 1 tablet by mouth Daily., Disp: 30 tablet, Rfl: 2    metFORMIN (GLUCOPHAGE) 500 MG tablet, Take 1 tablet by mouth 2 (Two) Times a Day As Needed (only takes if blood glucose is above 200). As needed, Disp: , Rfl:     nystatin (MYCOSTATIN) 289208 UNIT/GM powder, Apply 1 Application topically to the appropriate area as directed 4 (Four) Times a Day., Disp: , Rfl:       Vaccination Status   COVID 19: not interested in   Influenza: refused 2024; not interested in   Pneumococcal: not interested in   Zoster: not interested in     Drug-Drug Interactions: CNS depressants- Compazine + Hydroxyzine- reports dizziness when she has not eaten    Drug-Disease Interactions (non-cardioselective beta blockers): N/A    Patient Assistance: No issues; Fills COPD medications in apothecary    Inhaler Technique Observed? No  If yes, notes:     Treatment Goals: Risk Reduction and Symptom Control     Medication Assessment  & Plan:     Patient to continue Breztri 2 puffs BID. She would like all COPD medications mailed to her. Set medications to ship tomorrow for delivery on Friday.     Advised Pt on the importance of continuing maintenance inhaler regimen and the importance of rinsing mouth after ICS use.     Thank you for allowing me to participate in the care of your patient,    Maylin Tariq, DanitaD  4/9/2025  13:33 EDT

## 2025-04-09 NOTE — PROGRESS NOTES
"COPD Education    NAME:___Mayra Hays  :_1945_  APPOINTMENT DATE/TIME:___________25___14:05 EDT___________    COPD Education Evaluation            COPD Education Completed (See Note) Yes     COPD Clinic encounter: 3rd    Referring/consulting Provider: Dr. Venegas     Reason for Consultation:  COPD Exacerbation   COPD Diagnosis Length 3-4 years     Current Outpatient Pulmonologist     YES and BH           Subjective            Spirometry      Spirometry Completed YES and 24, during Pulmonology office visit.     FVC 33%     Fev1 37%     Fev1/FVC 83     GOLD Stage Unable to stage for COPD per GOLD guidelines per FEV1/FVC greater than 70.           Classification of Airflow Limitation Severity in COPD (Based on Post-Bronchodilator FEV1)           Gold 1: Mild FEV1 >= 80% predicted      Gold 2:  Moderate 50% <= FEV1 < 80% predicted   Gold 3: Severe 30% >= FEV1 < 50% predicted   Gold 4: Very Severe FEV1 < 30% predicted            Dyspnea:        Do you have Dyspnea? Yes and With Exertion.       DME Equipment Used:              Home O2? YES     Home O2 device? Home Oxygen concentrator  Portable Oxygen Tanks     Nebulizer YES     NIPPV None      Objective:      Barriers to Learning? No    Discussed:             COPD education given via the booklet \"A Patient's Guide to COPD\".     COPD Zones: (Green/yellow/Red): YES     Exacerbation or Flare up signs and symptoms: YES       COPD Medication Non-Compliance YES       Managing Medications: YES     Patient understands use of Rescue Medications: YESWe discussed her current maintenance and rescue inhalation regimen.     Type of Rescue inhaler patient currently has: Albuterol and Duoneb nebulizer treatment   Patient understands use of Maintenance Medications: YES     Type of Maintenance inhaler patient currently has: Julioi     Proper MDI technique (w/wo Spacer): YES     Provided patient a Spacer: YES       Smoking Cessation information offered to patient: NO "     Nicotine Replacement Therapy Discussed NO     Breathing Techniques:            Purse-lipped breathing YES     Oxygen therapy SAFETY:  YES           Action Plan            Aerobika for sputum mobilization: NO     Rescue Inhaler ordered: YES and Albuterol MDI, Duoneb nebulizer solution     COPD Maintenance Inhaler ordered: YES and Breztri     COPD/[facility] follow up scheduled: YES     Education Minutes with patient: YES and 25 minutes       Goals Discussed with patient: Keep home smoke free., Take medications as ordered., GO to Dr. appointments., Increase activity., Eat healthier., Increase use of pursed lip breathing., Decrease flare-ups., and Increase COPD Knowledge.    Comments: Ms. Hays returns to the COPD Clinic for a follow up visit.     Today we discussed and reviewed the above COPD Education topics. Ms. Hays has a home oxygen concentrator and portable tanks. During a previous visit, she completed a 6MWT with a SPO2 decrease to 79% but insurance would not cover a POC. Today, she has now obtained a different insurance. We completed a new 6MWT to try to qualify her for a POC.    Today,     -Attempted a 6MWT: She completed 20 meters and become short of breath with a SPO2 of 87% on room air.  -Reviewed her Alpha 1 Genotype result: M/M  -Continued reviewing the COPD education topics listed above.      Thank you for allowing me to participate in her care,      CHELSI Delgado, RRT  COPD Navigator  04/09/25  14:05 EDT

## 2025-04-09 NOTE — PROGRESS NOTES
Subjective      Chief Complaint  COPD    Subjective      History of Present Illness  Mayra Hays is a 79 y.o. female who presents today to [facility] with past medical history of chronic HFpEF, essential hypertension, hyperlipidemia, paroxysmal atrial fibrillation, CKD, type II diabetes mellitus, iron deficiency anemia, non-small cell lung cancer of right lung on chemotherapy, COPD, chronic hypoxic respiratory failure, and tobacco abuse who presents today for COPD. This visit is a follow up appointment.     COPD:  Patient was recently hospitalized from 3/3/2025 to 3/7/2025 for acute hypercapnic respiratory failure superimposed on chronic hypoxic respiratory failure due to right upper and right lower lobe pneumonia, influenza A, and COPD exacerbation.  Patient was started on Tamiflu for treatment of influenza A.  She was also started on cefepime and doxycycline which was later de-escalated to monotherapy with Rocephin due to bacterial pneumonia.  She was then transition to Omnicef and doxycycline upon discharge to complete a full 5-day course of antibiotic therapy.    Patient presents today for her follow-up visit at the COPD clinic.  She reports that she is doing well and feels that her breathing has returned back to her baseline since her discharge from the hospital.  She continues to utilize Breztri twice daily. She has an albuterol inhaler and DuoNeb treatments which she uses as needed. She has supplemental oxygen supplies which she utilizes as needed. She continues to follow closely with oncology for treatments of NSCLC of left lung which she receives maintenance chemotherapy with Durvalumab.       Current Outpatient Medications:   •  albuterol sulfate  (90 Base) MCG/ACT inhaler, Inhale 2 puffs by mouth Every 4 (Four) Hours As Needed for Wheezing., Disp: 18 g, Rfl: 3  •  apixaban (ELIQUIS) 5 MG tablet tablet, Take 1 tablet by mouth Every 12 (Twelve) Hours., Disp: 180  "tablet, Rfl: 3  •  atorvastatin (LIPITOR) 20 MG tablet, Take 1 tablet by mouth Daily., Disp: , Rfl:   •  Budeson-Glycopyrrol-Formoterol (Breztri Aerosphere) 160-9-4.8 MCG/ACT aerosol inhaler, Inhale 2 puffs by mouth 2 (Two) Times a Day., Disp: 32.1 g, Rfl: 3  •  esomeprazole (nexIUM) 20 MG capsule, Take 1 capsule by mouth Daily., Disp: , Rfl:   •  hydrOXYzine pamoate (VISTARIL) 25 MG capsule, Take 1 capsule by mouth Every Night., Disp: , Rfl:   •  ipratropium-albuterol (DUO-NEB) 0.5-2.5 mg/3 ml nebulizer, Take 3 mL by nebulization Every 4 (Four) Hours As Needed (Breathing)., Disp: 360 mL, Rfl: 3  •  lidocaine-prilocaine (EMLA) 2.5-2.5 % cream, Apply 1 Application topically to the appropriate area as directed As Needed for Mild Pain., Disp: 5 g, Rfl: 3  •  lisinopril (PRINIVIL,ZESTRIL) 10 MG tablet, Take 1 tablet by mouth Daily., Disp: 30 tablet, Rfl: 2  •  metFORMIN (GLUCOPHAGE) 500 MG tablet, Take 1 tablet by mouth 2 (Two) Times a Day As Needed (only takes if blood glucose is above 200). As needed, Disp: , Rfl:   •  nystatin (MYCOSTATIN) [id number] UNIT/GM powder, Apply 1 Application topically to the appropriate area as directed 4 (Four) Times a Day., Disp: , Rfl:   •  guaiFENesin (Mucinex) 600 MG 12 hr tablet, Take 2 tablets by mouth 2 (Two) Times a Day As Needed for Cough or Congestion., Disp: 120 tablet, Rfl: 3      Allergies   Allergen Reactions   • Paclitaxel Anaphylaxis       Objective     Objective   Vital Signs:  /78   Pulse 66   Wt 99.6 kg (219 lb 9.6 oz)   SpO2 96%   BMI 31.51 kg/m²   Estimated body mass index is 31.51 kg/m² as calculated from the following:    Height as of 3/3/25: 177.8 cm (70\").    Weight as of this encounter: 99.6 kg (219 lb 9.6 oz).    Past Medical History:   Diagnosis Date   • Arthritis    • CHF (congestive heart failure)    • Chronic anticoagulation     Eliquis   • Chronic kidney disease     stage II/IIIa   • Collapsed lung    • COPD (chronic obstructive pulmonary disease)  "   • Diabetes mellitus     TYPE 2   • Elevated cholesterol    • GERD (gastroesophageal reflux disease)    • Hyperlipidemia    • Hypertension    • Non-small cell lung cancer RUL    • Sleep apnea     non compliant with CPAP   • Stroke 2019   • Wears eyeglasses      Past Surgical History:   Procedure Laterality Date   • BRONCHOSCOPY Bilateral 02/27/2023    Procedure: BRONCHOSCOPY WITH ENDOBRONCHIAL ULTRASOUND;  Surgeon: Abdulkadir Peter MD;  Location: [facility];  Service: Pulmonary;  Laterality: Bilateral;   • BRONCHOSCOPY WITH ION ROBOTIC ASSIST N/A 9/6/2023    Procedure: BRONCHOSCOPY WITH ION ROBOT AND EBUS;  Surgeon: John Clemens MD;  Location:  [facility];  Service: Robotics - Pulmonary;  Laterality: N/A;  Ion cath #6 - 0032,  #6 - 0030, cath guide # 0077. EBUS scope removed with balloon intact.   • CARDIAC CATHETERIZATION N/A 08/22/2017    Procedure: Left Heart Cath;  Surgeon: Jatinder Matias MD;  Location: [facility] INVASIVE LOCATION;  Service:    • CARDIAC CATHETERIZATION N/A 11/22/2021    Procedure: Left Heart Cath;  Surgeon: Tolu Steinberg MD;  Location: [facility] CATH INVASIVE LOCATION;  Service: Cardiology;  Laterality: N/A;   • COLONOSCOPY     • ENDOSCOPY     • GALLBLADDER SURGERY     • PORTACATH PLACEMENT N/A 9/29/2023    Procedure: INSERTION OF PORTACATH;  Surgeon: Petr Velásquez MD;  Location: [facility];  Service: General;  Laterality: N/A;   • VENTRAL HERNIA REPAIR N/A 05/14/2020    Procedure: VENTRAL HERNIA REPAIR LAPAROSCOPIC WITH DAVINCI ROBOT;  Surgeon: Petr Velásquez MD;  Location: [facility];  Service: DaVinci;  Laterality: N/A;     Social History     Socioeconomic History   • Marital status: Single   • Number of children: 6   Tobacco Use   • Smoking status: Former     Current packs/day: 0.00     Average packs/day: 3.0 packs/day for 16.7 years (50.2 ttl pk-yrs)     Types: Cigarettes     Start date: 4/3/1998     Quit date: 1/1/2015     Years since quitting: 10.2      "Passive exposure: Past   • Smokeless tobacco: Never   Vaping Use   • Vaping status: Never Used   Substance and Sexual Activity   • Alcohol use: No   • Drug use: No   • Sexual activity: Never        Physical Exam  Constitutional:       General: She is awake.      Appearance: Normal appearance. She is obese.   HENT:      Head: Normocephalic and atraumatic.      Nose: Nose normal.      Mouth/Throat:      Mouth: Mucous membranes are moist.      Pharynx: Oropharynx is clear.   Eyes:      Conjunctiva/sclera: Conjunctivae normal.      Pupils: Pupils are equal, round, and reactive to light.   Cardiovascular:      Rate and Rhythm: Normal rate and regular rhythm.      Pulses: Normal pulses.      Heart sounds: Normal heart sounds. No murmur heard.     No friction rub. No gallop.   Pulmonary:      Effort: Pulmonary effort is normal. No tachypnea, accessory muscle usage or respiratory distress.      Breath sounds: Normal breath sounds. No decreased breath sounds, wheezing, rhonchi or rales.   Musculoskeletal:         General: Normal range of motion.      Cervical back: Full passive range of motion without pain and normal range of motion.   Skin:     General: Skin is warm and dry.   Neurological:      General: No focal deficit present.      Mental Status: She is alert. Mental status is at baseline.   Psychiatric:         Mood and Affect: Mood normal.         Behavior: Behavior normal. Behavior is cooperative.         Thought Content: Thought content normal.          Result Review :  The following labs and radiology results have been reviewed.    Lab Review:   No results found for: \"FEV1\", \"FVC\", \"[id number]\", \"TLC\", \"DLCO\"  No results displayed because visit has over 200 results.      Infusion on 02/13/2025   Component Date Value Ref Range Status   • Glucose 02/13/2025 176 (H)  65 - 99 mg/dL Final   • BUN 02/13/2025 19  8 - 23 mg/dL Final   • Creatinine 02/13/2025 1.02 (H)  0.57 - 1.00 mg/dL Final   • Sodium 02/13/2025 141  136 - " 145 mmol/L Final   • Potassium 02/13/2025 4.0  3.5 - 5.2 mmol/L Final    Slight hemolysis detected by analyzer. Result may be falsely elevated.   • Chloride 02/13/2025 104  98 - 107 mmol/L Final   • CO2 02/13/2025 27.3  22.0 - 29.0 mmol/L Final   • Calcium 02/13/2025 9.3  8.6 - 10.5 mg/dL Final   • Total Protein 02/13/2025 7.7  6.0 - 8.5 g/dL Final   • Albumin 02/13/2025 4.1  3.5 - 5.2 g/dL Final   • ALT (SGPT) 02/13/2025 12  1 - 33 U/L Final   • AST (SGOT) 02/13/2025 14  1 - 32 U/L Final   • Alkaline Phosphatase 02/13/2025 108  39 - 117 U/L Final   • Total Bilirubin 02/13/2025 0.2  0.0 - 1.2 mg/dL Final   • Globulin 02/13/2025 3.6  gm/dL Final   • A/G Ratio 02/13/2025 1.1  g/dL Final   • BUN/Creatinine Ratio 02/13/2025 18.6  7.0 - 25.0 Final   • Anion Gap 02/13/2025 9.7  5.0 - 15.0 mmol/L Final   • eGFR 02/13/2025 56.4 (L)  >60.0 mL/min/1.73 Final   • Iron 02/13/2025 75  37 - 145 mcg/dL Final   • Iron Saturation (TSAT) 02/13/2025 18 (L)  20 - 50 % Final   • Transferrin 02/13/2025 275  200 - 360 mg/dL Final   • TIBC 02/13/2025 410  298 - 536 mcg/dL Final   • Folate 02/13/2025 10.30  4.78 - 24.20 ng/mL Final   • Ferritin 02/13/2025 65.31  13.00 - 150.00 ng/mL Final   • Vitamin B-12 02/13/2025 458  211 - 946 pg/mL Final   • WBC 02/13/2025 5.94  3.40 - 10.80 10*3/mm3 Final   • RBC 02/13/2025 4.55  3.77 - 5.28 10*6/mm3 Final   • Hemoglobin 02/13/2025 12.2  12.0 - 15.9 g/dL Final   • Hematocrit 02/13/2025 39.4  34.0 - 46.6 % Final   • MCV 02/13/2025 86.6  79.0 - 97.0 fL Final   • MCH 02/13/2025 26.8  26.6 - 33.0 pg Final   • MCHC 02/13/2025 31.0 (L)  31.5 - 35.7 g/dL Final   • RDW 02/13/2025 13.6  12.3 - 15.4 % Final   • RDW-SD 02/13/2025 42.9  37.0 - 54.0 fl Final   • MPV 02/13/2025 10.9  6.0 - 12.0 fL Final   • Platelets 02/13/2025 203  140 - 450 10*3/mm3 Final   • Neutrophil % 02/13/2025 64.5  42.7 - 76.0 % Final   • Lymphocyte % 02/13/2025 19.7  19.6 - 45.3 % Final   • Monocyte % 02/13/2025 8.2  5.0 - 12.0 % Final    • Eosinophil % 02/13/2025 6.1  0.3 - 6.2 % Final   • Basophil % 02/13/2025 1.2  0.0 - 1.5 % Final   • Immature Grans % 02/13/2025 0.3  0.0 - 0.5 % Final   • Neutrophils, Absolute 02/13/2025 3.83  1.70 - 7.00 10*3/mm3 Final   • Lymphocytes, Absolute 02/13/2025 1.17  0.70 - 3.10 10*3/mm3 Final   • Monocytes, Absolute 02/13/2025 0.49  0.10 - 0.90 10*3/mm3 Final   • Eosinophils, Absolute 02/13/2025 0.36  0.00 - 0.40 10*3/mm3 Final   • Basophils, Absolute 02/13/2025 0.07  0.00 - 0.20 10*3/mm3 Final   • Immature Grans, Absolute 02/13/2025 0.02  0.00 - 0.05 10*3/mm3 Final   • nRBC 02/13/2025 0.0  0.0 - 0.2 /100 WBC Final   Infusion on 01/30/2025   Component Date Value Ref Range Status   • Glucose 01/30/2025 256 (H)  65 - 99 mg/dL Final   • BUN 01/30/2025 18  8 - 23 mg/dL Final   • Creatinine 01/30/2025 1.13 (H)  0.57 - 1.00 mg/dL Final   • Sodium 01/30/2025 141  136 - 145 mmol/L Final   • Potassium 01/30/2025 3.9  3.5 - 5.2 mmol/L Final    Slight hemolysis detected by analyzer. Result may be falsely elevated.   • Chloride 01/30/2025 103  98 - 107 mmol/L Final   • CO2 01/30/2025 26.8  22.0 - 29.0 mmol/L Final   • Calcium 01/30/2025 9.1  8.6 - 10.5 mg/dL Final   • Total Protein 01/30/2025 7.6  6.0 - 8.5 g/dL Final   • Albumin 01/30/2025 4.0  3.5 - 5.2 g/dL Final   • ALT (SGPT) 01/30/2025 8  1 - 33 U/L Final   • AST (SGOT) 01/30/2025 14  1 - 32 U/L Final   • Alkaline Phosphatase 01/30/2025 102  39 - 117 U/L Final   • Total Bilirubin 01/30/2025 0.3  0.0 - 1.2 mg/dL Final   • Globulin 01/30/2025 3.6  gm/dL Final   • A/G Ratio 01/30/2025 1.1  g/dL Final   • BUN/Creatinine Ratio 01/30/2025 15.9  7.0 - 25.0 Final   • Anion Gap 01/30/2025 11.2  5.0 - 15.0 mmol/L Final   • eGFR 01/30/2025 49.9 (L)  >60.0 mL/min/1.73 Final   • TSH 01/30/2025 2.660  0.270 - 4.200 uIU/mL Final   • Free T4 01/30/2025 0.88 (L)  0.92 - 1.68 ng/dL Final   • WBC 01/30/2025 6.29  3.40 - 10.80 10*3/mm3 Final   • RBC 01/30/2025 4.40  3.77 - 5.28 10*6/mm3  Final   • Hemoglobin 01/30/2025 11.8 (L)  12.0 - 15.9 g/dL Final   • Hematocrit 01/30/2025 38.6  34.0 - 46.6 % Final   • MCV 01/30/2025 87.7  79.0 - 97.0 fL Final   • MCH 01/30/2025 26.8  26.6 - 33.0 pg Final   • MCHC 01/30/2025 30.6 (L)  31.5 - 35.7 g/dL Final   • RDW 01/30/2025 13.9  12.3 - 15.4 % Final   • RDW-SD 01/30/2025 44.3  37.0 - 54.0 fl Final   • MPV 01/30/2025 10.7  6.0 - 12.0 fL Final   • Platelets 01/30/2025 218  140 - 450 10*3/mm3 Final   • Neutrophil % 01/30/2025 69.4  42.7 - 76.0 % Final   • Lymphocyte % 01/30/2025 17.0 (L)  19.6 - 45.3 % Final   • Monocyte % 01/30/2025 7.6  5.0 - 12.0 % Final   • Eosinophil % 01/30/2025 4.1  0.3 - 6.2 % Final   • Basophil % 01/30/2025 1.4  0.0 - 1.5 % Final   • Immature Grans % 01/30/2025 0.5  0.0 - 0.5 % Final   • Neutrophils, Absolute 01/30/2025 4.36  1.70 - 7.00 10*3/mm3 Final   • Lymphocytes, Absolute 01/30/2025 1.07  0.70 - 3.10 10*3/mm3 Final   • Monocytes, Absolute 01/30/2025 0.48  0.10 - 0.90 10*3/mm3 Final   • Eosinophils, Absolute 01/30/2025 0.26  0.00 - 0.40 10*3/mm3 Final   • Basophils, Absolute 01/30/2025 0.09  0.00 - 0.20 10*3/mm3 Final   • Immature Grans, Absolute 01/30/2025 0.03  0.00 - 0.05 10*3/mm3 Final   • nRBC 01/30/2025 0.0  0.0 - 0.2 /100 WBC Final   Infusion on 01/16/2025   Component Date Value Ref Range Status   • Glucose 01/16/2025 216 (H)  65 - 99 mg/dL Final   • BUN 01/16/2025 17  8 - 23 mg/dL Final   • Creatinine 01/16/2025 1.17 (H)  0.57 - 1.00 mg/dL Final   • Sodium 01/16/2025 136  136 - 145 mmol/L Final   • Potassium 01/16/2025 3.5  3.5 - 5.2 mmol/L Final   • Chloride 01/16/2025 98  98 - 107 mmol/L Final   • CO2 01/16/2025 28.3  22.0 - 29.0 mmol/L Final   • Calcium 01/16/2025 9.3  8.6 - 10.5 mg/dL Final   • Total Protein 01/16/2025 7.4  6.0 - 8.5 g/dL Final   • Albumin 01/16/2025 4.1  3.5 - 5.2 g/dL Final   • ALT (SGPT) 01/16/2025 11  1 - 33 U/L Final   • AST (SGOT) 01/16/2025 18  1 - 32 U/L Final   • Alkaline Phosphatase 01/16/2025  103  39 - 117 U/L Final   • Total Bilirubin 01/16/2025 0.3  0.0 - 1.2 mg/dL Final   • Globulin 01/16/2025 3.3  gm/dL Final   • A/G Ratio 01/16/2025 1.2  g/dL Final   • BUN/Creatinine Ratio 01/16/2025 14.5  7.0 - 25.0 Final   • Anion Gap 01/16/2025 9.7  5.0 - 15.0 mmol/L Final   • eGFR 01/16/2025 47.9 (L)  >60.0 mL/min/1.73 Final   • Iron 01/16/2025 59  37 - 145 mcg/dL Final   • Iron Saturation (TSAT) 01/16/2025 15 (L)  20 - 50 % Final   • Transferrin 01/16/2025 269  200 - 360 mg/dL Final   • TIBC 01/16/2025 401  298 - 536 mcg/dL Final   • Ferritin 01/16/2025 88.49  13.00 - 150.00 ng/mL Final   • WBC 01/16/2025 6.44  3.40 - 10.80 10*3/mm3 Final   • RBC 01/16/2025 4.08  3.77 - 5.28 10*6/mm3 Final   • Hemoglobin 01/16/2025 10.9 (L)  12.0 - 15.9 g/dL Final   • Hematocrit 01/16/2025 35.8  34.0 - 46.6 % Final   • MCV 01/16/2025 87.7  79.0 - 97.0 fL Final   • MCH 01/16/2025 26.7  26.6 - 33.0 pg Final   • MCHC 01/16/2025 30.4 (L)  31.5 - 35.7 g/dL Final   • RDW 01/16/2025 14.4  12.3 - 15.4 % Final   • RDW-SD 01/16/2025 46.3  37.0 - 54.0 fl Final   • MPV 01/16/2025 10.7  6.0 - 12.0 fL Final   • Platelets 01/16/2025 241  140 - 450 10*3/mm3 Final   • Neutrophil % 01/16/2025 73.8  42.7 - 76.0 % Final   • Lymphocyte % 01/16/2025 12.6 (L)  19.6 - 45.3 % Final   • Monocyte % 01/16/2025 7.3  5.0 - 12.0 % Final   • Eosinophil % 01/16/2025 4.7  0.3 - 6.2 % Final   • Basophil % 01/16/2025 1.1  0.0 - 1.5 % Final   • Immature Grans % 01/16/2025 0.5  0.0 - 0.5 % Final   • Neutrophils, Absolute 01/16/2025 4.76  1.70 - 7.00 10*3/mm3 Final   • Lymphocytes, Absolute 01/16/2025 0.81  0.70 - 3.10 10*3/mm3 Final   • Monocytes, Absolute 01/16/2025 0.47  0.10 - 0.90 10*3/mm3 Final   • Eosinophils, Absolute 01/16/2025 0.30  0.00 - 0.40 10*3/mm3 Final   • Basophils, Absolute 01/16/2025 0.07  0.00 - 0.20 10*3/mm3 Final   • Immature Grans, Absolute 01/16/2025 0.03  0.00 - 0.05 10*3/mm3 Final   • nRBC 01/16/2025 0.0  0.0 - 0.2 /100 WBC Final         Imaging Results (Most Recent)       None            Radiology Review:   Imaging Results (Last 72 Hours)       ** No results found for the last 72 hours. **          Reviewed CT chest with contrast from 3/20/2025  Narrative & Impression   EXAM:    CT Chest With Intravenous Contrast     EXAM DATE:    3/20/2025 12:05 PM     CLINICAL HISTORY:    h/o NSCLC, on consolidation therapy; C34.91-Malignant neoplasm of  unspecified part of right bronchus or lung     TECHNIQUE:    Axial computed tomography images of the chest with intravenous  contrast.  Sagittal and coronal reformatted images were created and  reviewed.  This CT exam was performed using one or more of the following  dose reduction techniques:  automated exposure control, adjustment of  the mA and/or kV according to patient size, and/or use of iterative  reconstruction technique.     COMPARISON:    CT Chest dated 11/23/2024     FINDINGS:    Limitations:  Please note that reported measurements are made  manually, as computer generated (AI) measurements can over measure  lesions. Additionally, lesions identified by AI may have been present  presently, significant nodules will be discussed in the report and the  visual depictions of lesions provided by AI are for general reference  only.    Lungs and pleural spaces:  Stable right upper lobe 4.1 cm  consolidative opacity.  No pneumothorax.  No significant effusion.    Heart:  Cardiomegaly.  No significant pericardial effusion.  No  significant coronary artery calcifications.    Mediastinum:  Nonspecific esophageal wall thickening that could be  treatment related.    Bones/joints:  Unremarkable as visualized.  No acute fracture.  No  dislocation.    Soft tissues:  Unremarkable as visualized.    Vasculature:  Unremarkable as visualized.  No thoracic aortic  aneurysm.    Lymph nodes:  Unremarkable as visualized.  No enlarged lymph nodes.    Tubes, lines and devices:  Right Port-A-Cath with tip in SVC.      IMPRESSION:  1.  Stable right upper lobe 4.1 cm consolidative opacity.  2.  Cardiomegaly.  3.  Nonspecific esophageal wall thickening that could be treatment  related.     This report was finalized on 3/20/2025 12:06 PM by Dr. Bill Parham MD.     Reviewed spirometry from 08/13/2024      Assessment / Plan         Assessment   Diagnoses and all orders for this visit:    1. Chronic obstructive pulmonary disease, unspecified COPD type (Primary)  Alpha-1 genotype is normal (M/M).  Continue albuterol inhaler as needed.  Continue DuoNeb treatments as needed.  Continue Breztri inhaler 2 puffs twice daily.  Continue Mucinex tablets as needed.  Will start patient on Ohtuvayre, discussed side effects during visit, and completed patient consent/prescription form.     -     Budeson-Glycopyrrol-Formoterol (Breztri Aerosphere) 160-9-4.8 MCG/ACT aerosol inhaler; Inhale 2 puffs by mouth 2 (Two) Times a Day.  Dispense: 32.1 g; Refill: 3  -     6 Minute Walk Test; Future  -     albuterol sulfate  (90 Base) MCG/ACT inhaler; Inhale 2 puffs by mouth Every 4 (Four) Hours As Needed for Wheezing.  Dispense: 18 g; Refill: 3  -     ipratropium-albuterol (DUO-NEB) 0.5-2.5 mg/3 ml nebulizer; Take 3 mL by nebulization Every 4 (Four) Hours As Needed (Breathing).  Dispense: 360 mL; Refill: 3  -     guaiFENesin (Mucinex) 600 MG 12 hr tablet; Take 2 tablets by mouth 2 (Two) Times a Day As Needed for Cough or Congestion.  Dispense: 120 tablet; Refill: 3    2. Chronic respiratory failure with hypoxia and hypercapnia  Patient was previously trialed on BiPAP therapy during recent hospitalization but wasn't able to tolerate.  Has supplemental oxygen supplies to use as needed (stationary oxygen concentrator and large tanks)  Completed 6-MWT during visit in which oxygen saturations dropped to 87% while ambulating on room air.  Will send order for rechargeable portable oxygen concentrator to [facility]    3. Former smoker  Mayra Hays  reports  that she quit smoking about 10 years ago. Her smoking use included cigarettes. She started smoking about 27 years ago. She has a 50.2 pack-year smoking history. She has been exposed to tobacco smoke. She has never used smokeless tobacco.    4. Non-small cell cancer of right lung  Follows closely with oncology. Completed radiation and remains on maintenance chemotherapy with Durvalumab.   Continue routine imaging per oncology recommendations.     Medications Discontinued During This Encounter   Medication Reason   • albuterol sulfate  (90 Base) MCG/ACT inhaler Reorder   • Budeson-Glycopyrrol-Formoterol (Breztri Aerosphere) 160-9-4.8 MCG/ACT aerosol inhaler Reorder   • ipratropium-albuterol (DUO-NEB) 0.5-2.5 mg/3 ml nebulizer Reorder     New Medications Ordered This Visit   Medications   • albuterol sulfate  (90 Base) MCG/ACT inhaler     Sig: Inhale 2 puffs by mouth Every 4 (Four) Hours As Needed for Wheezing.     Dispense:  18 g     Refill:  3   • Budeson-Glycopyrrol-Formoterol (Breztri Aerosphere) 160-9-4.8 MCG/ACT aerosol inhaler     Sig: Inhale 2 puffs by mouth 2 (Two) Times a Day.     Dispense:  32.1 g     Refill:  3     Note from [employer] suggested to bill under Medicare Part D (covered)   • ipratropium-albuterol (DUO-NEB) 0.5-2.5 mg/3 ml nebulizer     Sig: Take 3 mL by nebulization Every 4 (Four) Hours As Needed (Breathing).     Dispense:  360 mL     Refill:  3   • guaiFENesin (Mucinex) 600 MG 12 hr tablet     Sig: Take 2 tablets by mouth 2 (Two) Times a Day As Needed for Cough or Congestion.     Dispense:  120 tablet     Refill:  3     I spent >40 minutes caring for Mayra on this date of service. This time includes time spent by me in the following activities:preparing for the visit, reviewing tests, obtaining and/or reviewing a separately obtained history, performing a medically appropriate examination and/or evaluation , counseling and educating the patient/family/caregiver, ordering  medications, tests, or procedures, referring and communicating with other health care professionals , documenting information in the medical record, independently interpreting results and communicating that information with the patient/family/caregiver, and care coordination    Follow Up   Return in about 3 months (around 7/10/2025), or if symptoms worsen or fail to improve, for Next scheduled follow up.    Patient was given instructions and counseling regarding her condition or for health maintenance advice. Please see specific information pulled into the AVS if appropriate.       This document has been electronically signed by Jocelyn Mendoza PA-C   April 10, 2025 00:30 EDT    Dictated Utilizing Dragon Dictation: Part of this note may be an electronic transcription/translation of spoken language to printed text using the Dragon Dictation System.

## 2025-04-28 ENCOUNTER — DOCUMENTATION (OUTPATIENT)
Dept: ONCOLOGY | Facility: CLINIC | Age: 80
End: 2025-04-28
Payer: MEDICARE

## 2025-04-28 NOTE — PROGRESS NOTES
SS received call from patient who request additional travel assistance.     SS provided patient with phone number for [employer] [phone] for daughter to contact regarding application completed.    Patient request for SS to contact daughter Hailey [phone]. SS contacted daughter Hailey to make aware. Daughter agreeable to contacting     SS made patient aware that SS will be out of office.    SS will follow and assist.

## 2025-05-06 ENCOUNTER — OFFICE VISIT (OUTPATIENT)
Dept: CARDIOLOGY | Facility: CLINIC | Age: 80
End: 2025-05-06
Payer: MEDICARE

## 2025-05-06 VITALS
HEIGHT: 65 IN | BODY MASS INDEX: 36.59 KG/M2 | DIASTOLIC BLOOD PRESSURE: 75 MMHG | RESPIRATION RATE: 16 BRPM | HEART RATE: 60 BPM | SYSTOLIC BLOOD PRESSURE: 143 MMHG | OXYGEN SATURATION: 95 % | WEIGHT: 219.6 LBS

## 2025-05-06 DIAGNOSIS — I10 ESSENTIAL HYPERTENSION: Chronic | ICD-10-CM

## 2025-05-06 DIAGNOSIS — I48.0 PAROXYSMAL ATRIAL FIBRILLATION: Chronic | ICD-10-CM

## 2025-05-06 DIAGNOSIS — I50.32 CHRONIC HEART FAILURE WITH PRESERVED EJECTION FRACTION: Primary | Chronic | ICD-10-CM

## 2025-05-06 NOTE — PROGRESS NOTES
Niki Antony, APRN  Mayra Hays  1945 05/06/2025    Patient Active Problem List   Diagnosis    Chronic heart failure with preserved ejection fraction    Essential hypertension    Type 2 diabetes mellitus    Abnormal nuclear stress test with moderate to large size anteroapical and lateral wall myocardial ischemia.    Paroxysmal atrial fibrillation    Nocturnal hypoxia    Ventral hernia without obstruction or gangrene    Chronic obstructive pulmonary disease    Former smoker    Gait instability    Chronic respiratory failure with hypoxia    Cigarette nicotine dependence in remission    Acute anemia    Iron deficiency anemia    Malabsorption due to intolerance, not elsewhere classified    Lung mass    Other specified anemias    Primary cancer of right upper lobe of lung    Non-small cell cancer of right lung    Port-A-Cath in place    Hyperlipidemia LDL goal <100    Sepsis due to pneumonia       Dear Niki Antony, APRN:    Subjective     History of Present Illness:    Chief Complaint   Patient presents with    Follow-up     6 mos    Med Management     verbal       Mayra Hays is a pleasant 79 y.o. female with a past medical history significant for [facility] on 11/22/2021 showing normal coronary arteries.  She does have diabetes mellitus, she does not currently smoke tobacco anymore but does have history of tobacco abuse.   She also has paroxysmal atrial fibrillation anticoagulated with Eliquis, severe COPD and follows with pulmonology, essential hypertension, and dyslipidemia. She also has non small cell lung cancer diagnosed in September 2023.  She comes in today for routine cardiology follow-up.     Since Mayra was last seen she was hospitalized on 2 occasions both times for pneumonia with hypoxic respiratory failure.  Thankfully she was treated appropriately and improved and discharged in stable condition.  Throughout both of these hospital stays it appears she did not have any cardiac issues such as  heart failure and is doing well today.  She reports that her breathing is at baseline denies any orthopnea or PND.  She also denies any palpitations, dizziness, or syncope.  She also denies any chest pains.  Blood pressure is marginally elevated today.    Allergies   Allergen Reactions    Paclitaxel Anaphylaxis   :      Current Outpatient Medications:     albuterol sulfate  (90 Base) MCG/ACT inhaler, Inhale 2 puffs by mouth Every 4 (Four) Hours As Needed for Wheezing., Disp: 18 g, Rfl: 3    apixaban (ELIQUIS) 5 MG tablet tablet, Take 1 tablet by mouth Every 12 (Twelve) Hours., Disp: 180 tablet, Rfl: 3    atorvastatin (LIPITOR) 20 MG tablet, Take 1 tablet by mouth Daily., Disp: , Rfl:     Budeson-Glycopyrrol-Formoterol (Breztri Aerosphere) 160-9-4.8 MCG/ACT aerosol inhaler, Inhale 2 puffs by mouth 2 (Two) Times a Day., Disp: 32.1 g, Rfl: 3    esomeprazole (nexIUM) 20 MG capsule, Take 1 capsule by mouth Daily., Disp: , Rfl:     guaiFENesin (Mucinex) 600 MG 12 hr tablet, Take 2 tablets by mouth 2 (Two) Times a Day As Needed for Cough or Congestion., Disp: 120 tablet, Rfl: 3    hydrOXYzine pamoate (VISTARIL) 25 MG capsule, Take 1 capsule by mouth Every Night., Disp: , Rfl:     ipratropium-albuterol (DUO-NEB) 0.5-2.5 mg/3 ml nebulizer, Take 3 mL by nebulization Every 4 (Four) Hours As Needed (Breathing)., Disp: 360 mL, Rfl: 3    lidocaine-prilocaine (EMLA) 2.5-2.5 % cream, Apply 1 Application topically to the appropriate area as directed As Needed for Mild Pain., Disp: 5 g, Rfl: 3    lisinopril (PRINIVIL,ZESTRIL) 10 MG tablet, Take 1 tablet by mouth Daily., Disp: 30 tablet, Rfl: 2    metFORMIN (GLUCOPHAGE) 500 MG tablet, Take 1 tablet by mouth 2 (Two) Times a Day As Needed (only takes if blood glucose is above 200). As needed, Disp: , Rfl:     nystatin (MYCOSTATIN) 736058 UNIT/GM powder, Apply 1 Application topically to the appropriate area as directed 4 (Four) Times a Day., Disp: , Rfl:     The following portions  "of the patient's history were reviewed and updated as appropriate: allergies, current medications, past family history, past medical history, past social history, past surgical history and problem list.    Social History     Tobacco Use    Smoking status: Former     Current packs/day: 0.00     Average packs/day: 3.0 packs/day for 16.7 years (50.2 ttl pk-yrs)     Types: Cigarettes     Start date: 4/3/1998     Quit date: 1/1/2015     Years since quitting: 10.3     Passive exposure: Past    Smokeless tobacco: Never   Vaping Use    Vaping status: Never Used   Substance Use Topics    Alcohol use: No    Drug use: No         Objective   Vitals:    05/06/25 1044   BP: 143/75   Pulse: 60   Resp: 16   SpO2: 95%   Weight: 99.6 kg (219 lb 9.6 oz)   Height: 165.1 cm (65\")     Body mass index is 36.54 kg/m².    ROS    Constitutional:       General: Not in acute distress.     Appearance: Healthy appearance. Well-developed and not in distress. Not diaphoretic.   Eyes:      Conjunctiva/sclera: Conjunctivae normal.      Pupils: Pupils are equal, round, and reactive to light.   HENT:      Head: Normocephalic and atraumatic.   Neck:      Vascular: No carotid bruit or JVD.   Pulmonary:      Effort: Pulmonary effort is normal. No respiratory distress.      Breath sounds: Normal breath sounds.   Cardiovascular:      Normal rate. Regular rhythm.   Edema:     Peripheral edema absent.   Skin:     General: Skin is cool.   Neurological:      Mental Status: Alert, oriented to person, place, and time and oriented to person, place and time.         Lab Results   Component Value Date     03/07/2025    K 4.0 03/07/2025     03/07/2025    CO2 23.8 03/07/2025    BUN 28 (H) 03/07/2025    CREATININE 0.92 03/07/2025    GLUCOSE 205 (H) 03/07/2025    CALCIUM 8.8 03/07/2025    AST 16 03/04/2025    ALT 14 03/04/2025    ALKPHOS 91 03/04/2025     Lab Results   Component Value Date    CKTOTAL 14 (L) 06/25/2018     Lab Results   Component Value Date "    WBC 6.00 03/07/2025    HGB 10.8 (L) 03/07/2025    HCT 35.6 03/07/2025     03/07/2025     Lab Results   Component Value Date    INR 1.12 (H) 03/03/2025    INR 1.12 (H) 11/23/2024    INR 1.15 (H) 06/06/2024     Lab Results   Component Value Date    MG 1.9 11/23/2024     Lab Results   Component Value Date    TSH 2.660 01/30/2025    TRIG 106 07/28/2022    HDL 52 07/28/2022    LDL 96 07/28/2022      Lab Results   Component Value Date    BNP 29.0 02/01/2019       During this visit the following were done:  Labs Reviewed []    Labs Ordered []    Radiology Reports Reviewed []    Radiology Ordered []    PCP Records Reviewed []    Referring Provider Records Reviewed []    ER Records Reviewed []    Hospital Records Reviewed []    History Obtained From Family []    Radiology Images Reviewed []    Other Reviewed []    Records Requested []       Procedures    Assessment & Plan    Diagnosis Plan   1. Chronic heart failure with preserved ejection fraction        2. Essential hypertension        3. Paroxysmal atrial fibrillation                 Recommendations:  Chronic HFpEF  Appears euvolemic denies any symptoms concerning for acute heart failure.  Will continue with Lipitor, lisinopril  Paroxysmal atrial fibrillation  Maintaining sinus rhythm anticoagulated with Eliquis she denies any bleeding issues.  Dyslipidemia  Continue Lipitor    No follow-ups on file.    As always, I appreciate very much the opportunity to participate in the cardiovascular care of your patients.      With Best Regards,    Darek Nelson PA-C

## 2025-05-14 NOTE — PROGRESS NOTES
Subjective     Date: 2025    Referring Provider  No ref. provider found    Chief Complaint  Symptomatic anemia   2. Non small Cell Lung Cancer  Cancer Staging   Stage IIIA (cT1c, cN2, cM0)        Subjective      Mayra Hays is a 79 y.o. female who presents today to [facility] for follow up.    HPI:   79 y.o. female with past medical history of diabetes mellitus type 2, hyperlipidemia, COPD, hypertension, atrial fibrillation on anticoagulation (Eliquis), h/o CVA and previous tobacco use presents for symptomatic anemia and NSCLC.    She is present today with her daughter, Hailey, who is assisting with history. Pt started work up for RUL lung nodule , since then noticed to have a decrease in her Hgb to 10.7 from normal in 2023. More recently, her Hgb /9 was 6.5, she was directed to ED where she received 1U PRBC.     Patient reports increased fatigue over the last several months. She reports no bleeding, however does report dark colored stool (melena) two days ago. Denies any other evidence of melena or hematochezia.     She has previously been diagnosed with iron deficiency (years ago) requiring oral iron supplements, but never received IV iron or bone marrow biopsy.    She and her daughter are unsure of her last colonoscopy and endoscopy history, think it may have occurred >5 years ago but unsure.    She denies recent weight loss, fever, chills,  night sweats.  Previous smoker, quit 5-6 years ago, smoked 3 packs/day X 30 years. Denies alcohol or drug use. Family history significant for son with leukemia, brothers with lung cancer.     Oncology History:  2023: low dose CT chest screenin.9 cm spiculated right upper lobe pulmonary nodule concerning for malignancy, PET/CT recommended.   2023: EBUS by Dr. Peter negative for malignancy for bronchial washing and FNA of station 10R  2023: PET/CT: Right upper lobe pulmonary nodule, more  posterior possible satellite nodules are postobstructive process measuring 2.1 cm with mSUV 14.5. Also with pretracheal region lymph node 2.6 cm.   6/29/2023: CT guided needle biopsy was non diagnostic, showed fibrosis and chronic inflammation.   8/9/2023: Lab work showed anemia with Hgb 6.5. Referred to ED. Bronchoscopy deferred.   9/6/2023: Navigational bronchoscopy performed by Dr. Clemens- Right upper lobe lung transbronchial biopsy revealed squamous cell carcinoma, lymph node at station 4R also involved with squamous cell carcinoma. PD-L1 TPS 60%. Patient not deemed a surgical candidate.  9/13-11/21/2023: Received weekly carboplatin  X 7 and radiation. Patient had a reaction to paclitaxel, it was omitted.   12/19/2023: Post treatment CT chest with improvement of masslike process RUL now 1.6 cm, suggest significant response to treatment. Mediastinal lymph nodes are now within normal limits of size.   January 26 2024-February 13 2025: Consolidation therapy with durvalumab     Ms. Hays presents today with her daughter, grand daughter, and great grand daughters. She is in a wheelchair. She lives alone with her grand son, able to perform her ADLs including cooking, cleaning.     Treatment history:        2.       Interval History 10/11/2023   Ms. Hays presents for follow up today, accompanied by her daughter in law. She started treatment with paclitaxel and carboplatin on 10/9, unfortunately had a anaphylactic reaction to taxol after 8 minutes of infusion causing her to go to the ED. Patient was awake and feeling back to herself at the time of ED visit. Carboplatin was not administered.     She reports doing well overall, no new symptoms. Started radiation therapy yesterday.      Interval History 10/24/2023   Ms. Hays presents for cycle 2 of carboplatin with concurrent radiation. Did well with first cycle. Denies any adverse effects including nausea, vomiting, diarrhea, neuropathy. Appetite is good. Radiation is  going well. No complaints today.  Denies any skin rash or bites.    Interval History 11/21/2023   Ms. Hays presents today for cycle 7 of carboplatin with concurrent radiation. She reports good tolerance thus far without neuropathy, nausea, vomiting, diarrhea. Appetite is stable.   Denies any GIB.    Interval History 01/11/2024   Ms. Hays presents after completion of therapy. She was admitted to the hospital 12/31 due to shortness of breath, found to have Afib with RVR and coronavirus HKU. She received inpatient antibiotics and steroids. Currently with improvement, still reports some fatigue.   We reviewed her last CT chest, which shows improvement after treatment. Discussed continuing immunotherapy.      Interval History 02/22/2024   Ms. Hays presents today for follow, prior to cycle 3 of maintenance durvalumab. Accompanied by her grand daughter's fiance. She reports improved energy. Denies any bleeding, nausea, vomiting, diarrhea, rash. On examination, noted to have petechia on bilateral distal lower extremities and R distal upper extremities, she believes these started today. Denies pruritus.     Interval History 03/21/2024    presents prior to C5 of consolidation therapy with durvalumab. She reports good tolerance to the last treatment, denies shortness of breath, diarrhea, nausea/vomiting, fatigue. She's been applying lotion to her legs, petechia has decreased, primarily just on her left leg now.     Interval History 04/18/2024   Ms. Hays presents prior to C7 of durvalumab. Reports good tolerance without nausea, vomiting, diarrhea. No changes in petechia. Denies any other complaints.    Interval History 06/13/2024   Ms. Hays presents for follow up prior to cycle 11 of durvalumab. She presented to ED 6/6 due to shortness of breath and cough. CXR showed RUL and left basilar airspace disease for which she received levofloxacin. Today with improvement in symptoms. Denies diarrhea, nausea, vomiting.      Interval History 07/18/2024   Ms. Hays presents for follow-up, prior to cycle 13 of consolidation Durvalumab. She reports good tolerance, denies any cough/shortness of breath, NVD. Continues to have non pruritic petechia on her legs.     Interval History 08/15/2024   Ms. Hays presents for follow up, prior to C 15 of Durvalumab. Surveillance CT chest 8/1 shows airspace disease with volume loss right upper lobe similar to prior exam, felt to reflect post treatment related change.   Denies any new symptoms, continues to do well with tx.    Interval History 08/29/2024  Ms. Hays presents today for follow up, prior to cycle 16 Durvalumab. Overall, she reports that she has been doing well since her last visit. She continues to tolerate the treatments well and without any noticeable side effects. She reports a good appetite, hydrating well. Denies any nausea/vomiting or diarrhea. No rash. She is without any specific complaints at this time.     Interval History 09/12/2024   Ms. Hays presents for follow up, prior to C17 of consolidation therapy with durvalumab. She reports doing well without nausea, vomiting, diarrhea. No changes in breathing or cough. Continues to use intermittent oxygen. No new symptoms.     Interval History 11/21/2024   Ms. Hays presents for C22 of durvalumab. Surveillance CT chest shows volume loss right suprahilar consistent with evolving fibrosis, no pulmonary nodule or mass right lung to suggest recurrent disease, no hilar mediastinal adenopathy, centrilobular nodules throughout left mid lower lung zones consistent with atypical pneumonia or aspiration pneumonitis.     She does report recovering from a recent pneumonia, received antibiotics from her PCP. Denies fever/chills. No other adverse effects with last treatments.    Interval History 01/03/2025   Since our last visit, Ms. Hays had been admitted to the hospital for pneumonia. After discharge, was diagnosed with rhinovirus. This has  delayed her treatments. Reports feeling much better today. Had dysguesia during sickness, which has subsided and improved. Denies shortness of breath or cough. Notes intermittent swelling of the leg, which improves with raising of the leg.    Interval History 02/13/2025   Ms. Hays presents for her last dose of durvalumab. She reports no adverse effects to therapy thus far. Doing well overall. No further shortness of breath or cough.     Interval History 05/15/2025   Ms. Hays presents for follow up. She reports doing well since completion of durvalumab. Has a chronic cough, not worsened. Denies worsened dyspnea from baseline. Seen by Pulmonary APRN today. Was recommended to continue duo-nebs and prescribed prednisonne for wheezing. We reviewed surveillance CT chest 3/20/2025 which showed stable right upper lobe 4.1 cm consolidative opacity, cardiomegaly, and non specific esophageal wall thickening. She denies any odynophagia or dysphagia.     Objective     Objective     Allergy:   Allergies   Allergen Reactions    Paclitaxel Anaphylaxis        Current Medications:   Current Outpatient Medications   Medication Sig Dispense Refill    albuterol sulfate  (90 Base) MCG/ACT inhaler Inhale 2 puffs by mouth Every 4 (Four) Hours As Needed for Wheezing. 18 g 3    apixaban (ELIQUIS) 5 MG tablet tablet Take 1 tablet by mouth Every 12 (Twelve) Hours. 180 tablet 3    atorvastatin (LIPITOR) 20 MG tablet Take 1 tablet by mouth Daily.      Budeson-Glycopyrrol-Formoterol (Breztri Aerosphere) 160-9-4.8 MCG/ACT aerosol inhaler Inhale 2 puffs by mouth 2 (Two) Times a Day. 32.1 g 3    esomeprazole (nexIUM) 20 MG capsule Take 1 capsule by mouth Daily.      guaiFENesin (Mucinex) 600 MG 12 hr tablet Take 2 tablets by mouth 2 (Two) Times a Day As Needed for Cough or Congestion. 120 tablet 3    HYDROcodone-acetaminophen (NORCO) 5-325 MG per tablet       hydrOXYzine pamoate (VISTARIL) 25 MG capsule Take 1 capsule by mouth Every Night.       ipratropium-albuterol (DUO-NEB) 0.5-2.5 mg/3 ml nebulizer Take 3 mL by nebulization Every 4 (Four) Hours As Needed (Breathing). 360 mL 3    lidocaine-prilocaine (EMLA) 2.5-2.5 % cream Apply 1 Application topically to the appropriate area as directed As Needed for Mild Pain. 5 g 3    lisinopril (PRINIVIL,ZESTRIL) 10 MG tablet Take 1 tablet by mouth Daily. 30 tablet 2    metFORMIN (GLUCOPHAGE) 500 MG tablet Take 1 tablet by mouth 2 (Two) Times a Day As Needed (only takes if blood glucose is above 200). As needed      nystatin (MYCOSTATIN) 671848 UNIT/GM powder Apply 1 Application topically to the appropriate area as directed 4 (Four) Times a Day.      predniSONE (DELTASONE) 20 MG tablet Take 2 tablets by mouth Daily. 10 tablet 0     No current facility-administered medications for this visit.       Past Medical History:  Past Medical History:   Diagnosis Date    Arthritis     CHF (congestive heart failure)     Chronic anticoagulation     Eliquis    Chronic kidney disease     stage II/IIIa    Collapsed lung     COPD (chronic obstructive pulmonary disease)     Diabetes mellitus     TYPE 2    Elevated cholesterol     GERD (gastroesophageal reflux disease)     Hyperlipidemia     Hypertension     Non-small cell lung cancer RUL     Sleep apnea     non compliant with CPAP    Stroke 2019    Wears eyeglasses        Past Surgical History:  Past Surgical History:   Procedure Laterality Date    BRONCHOSCOPY Bilateral 02/27/2023    Procedure: BRONCHOSCOPY WITH ENDOBRONCHIAL ULTRASOUND;  Surgeon: Abdulkadir Peter MD;  Location:  [facility];  Service: Pulmonary;  Laterality: Bilateral;    BRONCHOSCOPY WITH ION ROBOTIC ASSIST N/A 9/6/2023    Procedure: BRONCHOSCOPY WITH ION ROBOT AND EBUS;  Surgeon: John Clemens MD;  Location:  [facility];  Service: Robotics - Pulmonary;  Laterality: N/A;  Ion cath #6 - 0032,  #6 - 0030, cath guide # 0077. EBUS scope removed with balloon intact.    CARDIAC CATHETERIZATION N/A  08/22/2017    Procedure: Left Heart Cath;  Surgeon: Jatinder Matias MD;  Location:  [facility];  Service:     CARDIAC CATHETERIZATION N/A 11/22/2021    Procedure: Left Heart Cath;  Surgeon: Tolu Steinberg MD;  Location:  [facility];  Service: Cardiology;  Laterality: N/A;    COLONOSCOPY      ENDOSCOPY      GALLBLADDER SURGERY      PORTACATH PLACEMENT N/A 9/29/2023    Procedure: INSERTION OF PORTACATH;  Surgeon: Petr Velásquez MD;  Location:  [facility];  Service: General;  Laterality: N/A;    VENTRAL HERNIA REPAIR N/A 05/14/2020    Procedure: VENTRAL HERNIA REPAIR LAPAROSCOPIC WITH DAVINCI ROBOT;  Surgeon: Petr Velásquez MD;  Location:  [facility];  Service: DaVinci;  Laterality: N/A;       Social History:  Social History     Socioeconomic History    Marital status: Single    Number of children: 6   Tobacco Use    Smoking status: Former     Current packs/day: 0.00     Average packs/day: 3.0 packs/day for 16.7 years (50.2 ttl pk-yrs)     Types: Cigarettes     Start date: 4/3/1998     Quit date: 1/1/2015     Years since quitting: 10.3     Passive exposure: Past    Smokeless tobacco: Never   Vaping Use    Vaping status: Never Used   Substance and Sexual Activity    Alcohol use: No    Drug use: No    Sexual activity: Never         Family History:  Family History   Problem Relation Age of Onset    Heart disease Mother         Rhianna sophia    Heart disease Father     Heart attack Father     Heart failure Father        Review of Systems:  Review of Systems   Constitutional:  Positive for fatigue.   All other systems reviewed and are negative.      Vital Signs:   /74   Pulse 71   Temp 98.1 °F (36.7 °C) (Oral)   Resp 20   SpO2 95%      Physical Exam:  Physical Exam  Vitals reviewed.   Constitutional:       General: She is not in acute distress.     Appearance: Normal appearance. She is obese. She is not ill-appearing.      Comments: +hard of hearing   HENT:      Head:  Normocephalic and atraumatic.      Mouth/Throat:      Mouth: Mucous membranes are moist.      Pharynx: Oropharynx is clear.   Eyes:      Conjunctiva/sclera: Conjunctivae normal.      Pupils: Pupils are equal, round, and reactive to light.   Cardiovascular:      Rate and Rhythm: Normal rate and regular rhythm.      Heart sounds: No murmur heard.  Pulmonary:      Effort: Pulmonary effort is normal. No respiratory distress.      Breath sounds: Wheezing present.   Chest:      Comments: +R chest port  Abdominal:      General: Abdomen is flat. Bowel sounds are normal. There is no distension.      Palpations: Abdomen is soft. There is no mass.      Tenderness: There is no abdominal tenderness. There is no guarding.   Musculoskeletal:         General: No swelling. Normal range of motion.      Cervical back: Normal range of motion.   Lymphadenopathy:      Cervical: No cervical adenopathy.   Skin:     General: Skin is warm and dry.      Comments: RLE petechiae    Neurological:      General: No focal deficit present.      Mental Status: She is alert and oriented to person, place, and time. Mental status is at baseline.   Psychiatric:         Mood and Affect: Mood normal.         PHQ-9 Score  PHQ-9 Total Score:       Pain Score  Vitals:    05/15/25 1607   BP: 151/74   Pulse: 71   Resp: 20   Temp: 98.1 °F (36.7 °C)   TempSrc: Oral   SpO2: 95%   PainSc: 0-No pain                                       PAINSCOREQUALITYMETRIC:   Vitals:    05/15/25 1607   PainSc: 0-No pain                              Lab Review  Lab Results   Component Value Date    WBC 5.87 05/15/2025    HGB 11.6 (L) 05/15/2025    HCT 38.2 05/15/2025    MCV 85.5 05/15/2025    RDW 13.4 05/15/2025     05/15/2025    NEUTRORELPCT 57.1 05/15/2025    LYMPHORELPCT 24.7 05/15/2025    MONORELPCT 12.1 (H) 05/15/2025    EOSRELPCT 4.4 05/15/2025    BASORELPCT 1.0 05/15/2025    NEUTROABS 3.35 05/15/2025    LYMPHSABS 1.45 05/15/2025     Lab Results   Component Value Date      05/15/2025    K 4.0 05/15/2025    CO2 25.5 05/15/2025     05/15/2025    BUN 19 05/15/2025    CREATININE 1.00 05/15/2025    EGFRIFNONA 51 (L) 11/22/2021    GLUCOSE 168 (H) 05/15/2025    CALCIUM 9.0 05/15/2025    ALKPHOS 98 05/15/2025    AST 15 05/15/2025    ALT 13 05/15/2025    BILITOT 0.3 05/15/2025    ALBUMIN 4.1 05/15/2025    PROTEINTOT 7.5 05/15/2025    MG 1.9 11/23/2024    PHOS 3.5 05/18/2020     Lab Results   Component Value Date    RETICCTPCT 4.26 (H) 08/16/2023     Lab Results   Component Value Date    FERRITIN 78.59 05/15/2025    IRON 72 05/15/2025    TIBC 378 05/15/2025    LABIRON 19 (L) 05/15/2025    UABZAHVE49 365 05/15/2025    FOLATE 12.30 05/15/2025        Radiology Results    CT Chest With Contrast Diagnostic (03/20/2025 11:36)  IMPRESSION:  1.  Stable right upper lobe 4.1 cm consolidative opacity.  2.  Cardiomegaly.  3.  Nonspecific esophageal wall thickening that could be treatment  related.      CT Chest With Contrast Diagnostic (11/11/2024 11:02)     IMPRESSION:  1.  Soft tissue density with volume loss and air bronchograms emanating  from the right suprahilar aspect has features on CT most consistent with  evolving fibrosis.  2.  No discrete pulmonary nodule or mass has developed in the right lung  to suggest recurrent disease.  3.  No hilar or mediastinal adenopathy based on CT size criteria.  4.  Centrilobular nodules throughout the left mid-lower lung zones noted  most consistent with atypical pneumonia or aspiration pneumonitis.  5.  Mild interstitial thickening favors mild edema in the setting of CHF  or volume overload.    CT Chest With Contrast Diagnostic (08/01/2024 14:14)     FINDINGS:  Soft tissue windows demonstrate no adenopathy within the chest. The  thyroid gland is unremarkable. The heart is normal in size. The aorta is  normal in caliber. There are no pleural or pericardial effusions. Lung  windows redemonstrate airspace disease in the right upper lobe with  volume  loss felt to reflect posttreatment related change. Scarring is  present in the anterior left upper lobe and right middle lobe. There is  airspace disease in both lung bases similar in appearance to the prior  exam.. The visualized upper abdomen is unremarkable. Bone windows reveal  no lytic or destructive lesions.     IMPRESSION:  Airspace disease with volume loss in the right upper lobe  similar in appearance to the prior exam felt to reflect posttreatment  related change. Continued follow-up is recommended.      CT Chest With Contrast Diagnostic (05/01/2024 09:01)     IMPRESSION:  1. Development of airspace disease in the right upper lobe which is in  the region of a previously noted spiculated nodule likely reflecting  posttreatment related change.  2. Interval improvement in the previously noted bilateral lower lobe  airspace disease with a few patchy opacities remaining.    CT Chest Without Contrast Diagnostic (12/31/2023 20:30)   IMPRESSION:  Spiculated nodule of the right upper lobe concerning for neoplasm.  Masslike consolidation in the right lower lobe could represent pneumonia  with underlying mass not excluded. Atelectasis/airspace disease in the  left lower lobe and lingula. Mediastinal adenopathy. Follow-up  recommended.    CT Chest With Contrast Diagnostic (12/19/2023 14:47)     IMPRESSION:  1.  Significant decrease size and masslike appearance of process in the  right upper lobe which would suggest significant response to treatment.  2.  No suspicious pulmonary nodules or masses identified.  3.  Mediastinal lymph nodes are now within normal limits for size.  No  hilar or mediastinal adenopathy.  4.  Mild centrilobular nodularity of the left lower lobe which may  reflect changes of atypical pneumonia or aspiration pneumonitis.  5.  Stable cardiomegaly.  6.  Other incidental/nonacute findings above.      CT Angiogram Chest Pulmonary Embolism (10/09/2023 16:41)   MPRESSION:  1.  Right upper lobe mass in  association with pneumonia has increased in  size and extent when compared to the previous exam with extension to the  pleural surface. Masslike component is approximately 56.8 x 44.5 mm and  was previously 40.1 x 36.7 mm. This would correspond to primary  malignancy.  2.  Central bronchial wall thickening. Can be seen with reactive airway  disease or bronchitis.  3.  1.3 cm left adrenal nodule is noted. No significant change from  previous.  4.  Increased size of paratracheal and mediastinal lymph nodes. A right  paratracheal lymph node is 2.5 cm and was previously 2.2 cm.  5. No PE identified.  6. Marked cardiomegaly, stable.  7. Otherwise stable chest with other nonacute/incidental findings as  above.    CT Head Without Contrast (10/09/2023 12:00)   IMPRESSION:    Unremarkable exam demonstrating no CT evidence of acute intracranial  findings.      CT Chest Without Contrast Diagnostic (08/09/2023 12:38)       NM PET/CT Skull Base to Mid Thigh (06/20/2023 11:12)         CT Chest Low Dose Cancer Screening WO (02/14/2023 12:58)   IMPRESSION:    Interval development or enlargement of a 1.9 cm spiculated right upper  lobe pulmonary nodule concerning in appearance for malignancy and PET/CT  is recommended for further evaluation.        Pathology:     Tissue Pathology Exam (09/06/2023 08:13)         6/29/23           Assessment / Plan         Assessment and Plan   Mayra Hays is a 79 y.o. year old presents for       Non small cell lung cancer, squamous cell    Cancer Staging   Stage IIIA (cT1c, cN2, cM0)  -Oncology history as above. Patient with 1.9 cm spiculated right upper lobe nodule on low dose CT chest screen (2/2023). EBUS by Dr. Peter negative for malignancy (2/2023). PET/CT with hypermetabolism RUL 2.1 cm and pretracheal region lymph node (6/2023). CT guided biopsy negative for malignancy (6/2023). Navigational bronchoscopy with positive RUL and  4R (paratracheal) for squamous cell (9/6/2023). PD-L1 TPS score  60%.   -Completed weekly carboplatin AUC2. Patient experienced anaphylaxis 8 minutes into the paclitaxel on first cycle 10/9. Due to this, paclitaxel will be omitted from weekly chemotherapy.  -Radiation therapy 10/10; 6000 cGy in 30 treatment fractions. Complete 11/21/2023  -Post treatment CT 12/19/23 with good/partial response  -Completed consolidation therapy with durvalumab for one year 2/2025    2. Anemia; iron deficiency anemia due to GIB  - Patient with normal H/H in our system 2/2023, with acute drop in Hgb 6/29 (10.7) to Hgb (6.5) on 8/9 requiring transfusion. Patient reports one episode of melena two days ago (she is a poor historian).   -Last colonoscopy and endoscopy are unknown; think it may have been >5 years ago  -CBC from 8/14 show Hgb 8.2, Hct 27, MCV 90. Normal WBC and platelet count. Iron studies with normal iron (37), TIBC (435), and low iron saturation (9). This is in light of recent blood transfusion. Normal folate and B12 level. Reticulocyte count noted to be elevated to 6%  -Patient is with fatigue. Denies shortness of breath (aside from baseline COPD) or chest pain. Continues to be on Eliquis for Afib  -Initial work up from consultation confirmed iron deficiency anemia, normal folate/b12 levels. No indication of hemolysis seen with normal LDH and haptoglobin. Myeloma work up has been negative with no M spike on serum protein electrophoresis and normal k/l free light chain ratio. Peripheral smear with normal total WBC without granulocytic dysplasia or blasts.  -Received Ferumoxytol 8/29/2023 and 11/27/23  -Pt to see surgery post treatment for repeat EGD and Colonoscopy     3. Malignancy associated pain  - Currently denies    4. Surveillance (per [facility]CCN)  -Surveillance would include CT with and without contrast every 3 to 6 months for 3 years, then every 6 months for 2 years, then low-dose noncontrast CT annually.   Next CT 6/2025    5. Petechia of lower extremities -resolved   - Normal Plt  counts    Discussed possible differential diagnoses, testing, treatment, recommended non-pharmacological interventions, risks, warning signs to monitor for that would indicate need for follow-up in clinic or ER. If no improvement with these regimens or you have new or worsening symptoms follow-up. Patient verbalizes understanding and agreement with plan of care. Denies further needs or concerns.     Patient was given instructions and counseling regarding her condition and for health maintenance advised.       All questions were answered to her satisfaction.              Meds ordered during this visit  No orders of the defined types were placed in this encounter.      Visit Diagnoses    ICD-10-CM ICD-9-CM   1. Primary cancer of right upper lobe of lung  C34.11 162.3                                     Follow Up   In 12 weeks: CBC, iron studies, ferritin, CMP, folate, B12  FU CT chest         This document has been electronically signed by Shawna Bond MD   May 19, 2025 10:40 EDT    Dictated Utilizing Dragon Dictation: Part of this note may be an electronic transcription/translation of spoken language to printed text using the Dragon Dictation System.

## 2025-05-15 ENCOUNTER — OFFICE VISIT (OUTPATIENT)
Dept: ONCOLOGY | Facility: CLINIC | Age: 80
End: 2025-05-15
Payer: MEDICARE

## 2025-05-15 ENCOUNTER — INFUSION (OUTPATIENT)
Dept: ONCOLOGY | Facility: HOSPITAL | Age: 80
End: 2025-05-15
Payer: MEDICARE

## 2025-05-15 ENCOUNTER — OFFICE VISIT (OUTPATIENT)
Dept: PULMONOLOGY | Facility: CLINIC | Age: 80
End: 2025-05-15
Payer: MEDICARE

## 2025-05-15 ENCOUNTER — APPOINTMENT (OUTPATIENT)
Dept: ONCOLOGY | Facility: HOSPITAL | Age: 80
End: 2025-05-15
Payer: MEDICARE

## 2025-05-15 VITALS
DIASTOLIC BLOOD PRESSURE: 74 MMHG | HEART RATE: 78 BPM | WEIGHT: 221 LBS | HEIGHT: 65 IN | BODY MASS INDEX: 36.82 KG/M2 | OXYGEN SATURATION: 88 % | SYSTOLIC BLOOD PRESSURE: 120 MMHG | TEMPERATURE: 97.9 F

## 2025-05-15 VITALS
TEMPERATURE: 98.1 F | TEMPERATURE: 98.1 F | RESPIRATION RATE: 20 BRPM | DIASTOLIC BLOOD PRESSURE: 74 MMHG | RESPIRATION RATE: 20 BRPM | OXYGEN SATURATION: 95 % | SYSTOLIC BLOOD PRESSURE: 151 MMHG | DIASTOLIC BLOOD PRESSURE: 74 MMHG | OXYGEN SATURATION: 95 % | HEART RATE: 71 BPM | SYSTOLIC BLOOD PRESSURE: 151 MMHG | HEART RATE: 71 BPM

## 2025-05-15 DIAGNOSIS — Z95.828 PORT-A-CATH IN PLACE: Primary | ICD-10-CM

## 2025-05-15 DIAGNOSIS — R53.83 OTHER FATIGUE: ICD-10-CM

## 2025-05-15 DIAGNOSIS — C34.91 NON-SMALL CELL CANCER OF RIGHT LUNG: ICD-10-CM

## 2025-05-15 DIAGNOSIS — C34.11 PRIMARY CANCER OF RIGHT UPPER LOBE OF LUNG: Primary | ICD-10-CM

## 2025-05-15 DIAGNOSIS — J96.12 CHRONIC RESPIRATORY FAILURE WITH HYPOXIA AND HYPERCAPNIA: ICD-10-CM

## 2025-05-15 DIAGNOSIS — D50.9 IRON DEFICIENCY ANEMIA, UNSPECIFIED IRON DEFICIENCY ANEMIA TYPE: ICD-10-CM

## 2025-05-15 DIAGNOSIS — Z87.891 FORMER SMOKER: ICD-10-CM

## 2025-05-15 DIAGNOSIS — C34.11 PRIMARY CANCER OF RIGHT UPPER LOBE OF LUNG: ICD-10-CM

## 2025-05-15 DIAGNOSIS — K90.49 MALABSORPTION DUE TO INTOLERANCE, NOT ELSEWHERE CLASSIFIED: ICD-10-CM

## 2025-05-15 DIAGNOSIS — R79.89 ELEVATED SERUM CREATININE: ICD-10-CM

## 2025-05-15 DIAGNOSIS — J96.11 CHRONIC RESPIRATORY FAILURE WITH HYPOXIA AND HYPERCAPNIA: ICD-10-CM

## 2025-05-15 DIAGNOSIS — J44.9 CHRONIC OBSTRUCTIVE PULMONARY DISEASE, UNSPECIFIED COPD TYPE: Primary | ICD-10-CM

## 2025-05-15 LAB
ALBUMIN SERPL-MCNC: 4.1 G/DL (ref 3.5–5.2)
ALBUMIN/GLOB SERPL: 1.2 G/DL
ALP SERPL-CCNC: 98 U/L (ref 39–117)
ALT SERPL W P-5'-P-CCNC: 13 U/L (ref 1–33)
ANION GAP SERPL CALCULATED.3IONS-SCNC: 10.5 MMOL/L (ref 5–15)
AST SERPL-CCNC: 15 U/L (ref 1–32)
BASOPHILS # BLD AUTO: 0.06 10*3/MM3 (ref 0–0.2)
BASOPHILS NFR BLD AUTO: 1 % (ref 0–1.5)
BILIRUB SERPL-MCNC: 0.3 MG/DL (ref 0–1.2)
BUN SERPL-MCNC: 19 MG/DL (ref 8–23)
BUN/CREAT SERPL: 19 (ref 7–25)
CALCIUM SPEC-SCNC: 9 MG/DL (ref 8.6–10.5)
CHLORIDE SERPL-SCNC: 104 MMOL/L (ref 98–107)
CO2 SERPL-SCNC: 25.5 MMOL/L (ref 22–29)
CREAT SERPL-MCNC: 1 MG/DL (ref 0.57–1)
DEPRECATED RDW RBC AUTO: 41.8 FL (ref 37–54)
EGFRCR SERPLBLD CKD-EPI 2021: 57.8 ML/MIN/1.73
EOSINOPHIL # BLD AUTO: 0.26 10*3/MM3 (ref 0–0.4)
EOSINOPHIL NFR BLD AUTO: 4.4 % (ref 0.3–6.2)
ERYTHROCYTE [DISTWIDTH] IN BLOOD BY AUTOMATED COUNT: 13.4 % (ref 12.3–15.4)
FERRITIN SERPL-MCNC: 78.59 NG/ML (ref 13–150)
GLOBULIN UR ELPH-MCNC: 3.4 GM/DL
GLUCOSE SERPL-MCNC: 168 MG/DL (ref 65–99)
HCT VFR BLD AUTO: 38.2 % (ref 34–46.6)
HGB BLD-MCNC: 11.6 G/DL (ref 12–15.9)
IMM GRANULOCYTES # BLD AUTO: 0.04 10*3/MM3 (ref 0–0.05)
IMM GRANULOCYTES NFR BLD AUTO: 0.7 % (ref 0–0.5)
IRON 24H UR-MRATE: 72 MCG/DL (ref 37–145)
IRON SATN MFR SERPL: 19 % (ref 20–50)
LYMPHOCYTES # BLD AUTO: 1.45 10*3/MM3 (ref 0.7–3.1)
LYMPHOCYTES NFR BLD AUTO: 24.7 % (ref 19.6–45.3)
MCH RBC QN AUTO: 26 PG (ref 26.6–33)
MCHC RBC AUTO-ENTMCNC: 30.4 G/DL (ref 31.5–35.7)
MCV RBC AUTO: 85.5 FL (ref 79–97)
MONOCYTES # BLD AUTO: 0.71 10*3/MM3 (ref 0.1–0.9)
MONOCYTES NFR BLD AUTO: 12.1 % (ref 5–12)
NEUTROPHILS NFR BLD AUTO: 3.35 10*3/MM3 (ref 1.7–7)
NEUTROPHILS NFR BLD AUTO: 57.1 % (ref 42.7–76)
NRBC BLD AUTO-RTO: 0 /100 WBC (ref 0–0.2)
PLATELET # BLD AUTO: 195 10*3/MM3 (ref 140–450)
PMV BLD AUTO: 11.3 FL (ref 6–12)
POTASSIUM SERPL-SCNC: 4 MMOL/L (ref 3.5–5.2)
PROT SERPL-MCNC: 7.5 G/DL (ref 6–8.5)
RBC # BLD AUTO: 4.47 10*6/MM3 (ref 3.77–5.28)
SODIUM SERPL-SCNC: 140 MMOL/L (ref 136–145)
TIBC SERPL-MCNC: 378 MCG/DL (ref 298–536)
TRANSFERRIN SERPL-MCNC: 254 MG/DL (ref 200–360)
WBC NRBC COR # BLD AUTO: 5.87 10*3/MM3 (ref 3.4–10.8)

## 2025-05-15 PROCEDURE — 82607 VITAMIN B-12: CPT

## 2025-05-15 PROCEDURE — 36591 DRAW BLOOD OFF VENOUS DEVICE: CPT

## 2025-05-15 PROCEDURE — 3078F DIAST BP <80 MM HG: CPT | Performed by: INTERNAL MEDICINE

## 2025-05-15 PROCEDURE — 25010000002 HEPARIN LOCK FLUSH PER 10 UNITS: Performed by: NURSE PRACTITIONER

## 2025-05-15 PROCEDURE — 82746 ASSAY OF FOLIC ACID SERUM: CPT

## 2025-05-15 PROCEDURE — 85025 COMPLETE CBC W/AUTO DIFF WBC: CPT

## 2025-05-15 PROCEDURE — 83540 ASSAY OF IRON: CPT

## 2025-05-15 PROCEDURE — G2211 COMPLEX E/M VISIT ADD ON: HCPCS | Performed by: INTERNAL MEDICINE

## 2025-05-15 PROCEDURE — 80053 COMPREHEN METABOLIC PANEL: CPT

## 2025-05-15 PROCEDURE — 84466 ASSAY OF TRANSFERRIN: CPT

## 2025-05-15 PROCEDURE — 99214 OFFICE O/P EST MOD 30 MIN: CPT | Performed by: INTERNAL MEDICINE

## 2025-05-15 PROCEDURE — 82728 ASSAY OF FERRITIN: CPT

## 2025-05-15 PROCEDURE — 1126F AMNT PAIN NOTED NONE PRSNT: CPT | Performed by: INTERNAL MEDICINE

## 2025-05-15 PROCEDURE — 3077F SYST BP >= 140 MM HG: CPT | Performed by: INTERNAL MEDICINE

## 2025-05-15 RX ORDER — ALBUTEROL SULFATE 90 UG/1
2 INHALANT RESPIRATORY (INHALATION) EVERY 4 HOURS PRN
Qty: 18 G | Refills: 3 | Status: SHIPPED | OUTPATIENT
Start: 2025-05-15

## 2025-05-15 RX ORDER — PREDNISONE 20 MG/1
40 TABLET ORAL DAILY
Qty: 10 TABLET | Refills: 0 | Status: SHIPPED | OUTPATIENT
Start: 2025-05-15

## 2025-05-15 RX ORDER — SODIUM CHLORIDE 0.9 % (FLUSH) 0.9 %
10 SYRINGE (ML) INJECTION AS NEEDED
Status: DISCONTINUED | OUTPATIENT
Start: 2025-05-15 | End: 2025-05-15 | Stop reason: HOSPADM

## 2025-05-15 RX ORDER — IPRATROPIUM BROMIDE AND ALBUTEROL SULFATE 2.5; .5 MG/3ML; MG/3ML
3 SOLUTION RESPIRATORY (INHALATION) EVERY 4 HOURS PRN
Qty: 360 ML | Refills: 3 | Status: SHIPPED | OUTPATIENT
Start: 2025-05-15

## 2025-05-15 RX ORDER — SODIUM CHLORIDE 0.9 % (FLUSH) 0.9 %
20 SYRINGE (ML) INJECTION AS NEEDED
OUTPATIENT
Start: 2025-05-15

## 2025-05-15 RX ORDER — BUDESONIDE, GLYCOPYRROLATE, AND FORMOTEROL FUMARATE 160; 9; 4.8 UG/1; UG/1; UG/1
2 AEROSOL, METERED RESPIRATORY (INHALATION) 2 TIMES DAILY
Qty: 32.1 G | Refills: 3 | Status: SHIPPED | OUTPATIENT
Start: 2025-05-15

## 2025-05-15 RX ORDER — HEPARIN SODIUM (PORCINE) LOCK FLUSH IV SOLN 100 UNIT/ML 100 UNIT/ML
500 SOLUTION INTRAVENOUS AS NEEDED
Status: DISCONTINUED | OUTPATIENT
Start: 2025-05-15 | End: 2025-05-15 | Stop reason: HOSPADM

## 2025-05-15 RX ORDER — SODIUM CHLORIDE 0.9 % (FLUSH) 0.9 %
10 SYRINGE (ML) INJECTION AS NEEDED
OUTPATIENT
Start: 2025-05-15

## 2025-05-15 RX ORDER — HEPARIN SODIUM (PORCINE) LOCK FLUSH IV SOLN 100 UNIT/ML 100 UNIT/ML
500 SOLUTION INTRAVENOUS AS NEEDED
OUTPATIENT
Start: 2025-05-15

## 2025-05-15 RX ORDER — HEPARIN SODIUM (PORCINE) LOCK FLUSH IV SOLN 100 UNIT/ML 100 UNIT/ML
300 SOLUTION INTRAVENOUS ONCE
OUTPATIENT
Start: 2025-05-15

## 2025-05-15 RX ORDER — HYDROCODONE BITARTRATE AND ACETAMINOPHEN 5; 325 MG/1; MG/1
TABLET ORAL
COMMUNITY
Start: 2025-05-14

## 2025-05-15 RX ADMIN — HEPARIN 500 UNITS: 100 SYRINGE at 16:22

## 2025-05-15 RX ADMIN — Medication 10 ML: at 16:22

## 2025-05-15 NOTE — PROGRESS NOTES
"Chief Complaint  Chronic obstructive pulmonary disease, unspecified COPD type    Subjective          Mayra Hays presents to [facility] for   History of Present Illness    Ms. Hays is a 79 year old female with a medical history significant for stoke, arthritis, CHF, CKD, COPD, GERD, hyperlipidemia, hypertension, sleep apnea, and lung cancer.    She presents today for follow up on COPD. She reports that she has been doing well since her last visit.  She states that her breathing has been at baseline. She is currently taking Breztri BID, albuterol inhaler as needed and duonebs as needed.  She is using supplemental oxygen at 2 L but reports that she has had to change DME companies due ot insurance and is waiting on her new equipment.    Objective   Vital Signs:   /74   Pulse 78   Temp 97.9 °F (36.6 °C)   Ht 165.1 cm (65\")   Wt 100 kg (221 lb)   SpO2 (!) 88%   BMI 36.78 kg/m²         Physical Exam    GENERAL APPEARANCE: Well developed, well nourished, alert and cooperative, and appears to be in no acute distress.    HEAD: normocephalic. Atraumatic.    EYES: PERRL, EOMI. Vision is grossly intact.    THROAT: Oral cavity and pharynx normal. No inflammation, swelling, exudate, or lesions.     NECK: Neck supple.  No thyromegaly.    CARDIAC: Normal S1 and S2. No S3, S4 or murmurs. Rhythm is regular.     RESPIRATORY:Bilateral air entry positive.  Bilateral wheezing.    GI: Positive bowel sounds. Soft, nondistended, nontender.     MUSCULOSKELETAL: No significant deformity or joint abnormality. No edema. Peripheral pulses intact. No varicosities.    NEUROLOGICAL: Strength and sensation symmetric and intact throughout.     PSYCHIATRIC: The mental examination revealed the patient was oriented to person, place, and time.       Estimated body mass index is 36.78 kg/m² as calculated from the following:    Height as of this encounter: 165.1 cm (65\").    Weight as of " "this encounter: 100 kg (221 lb).        Result Review :   The following data was reviewed by: ERASTO Mckeon on 05/15/2025:  Common labs          3/5/2025    01:27 3/6/2025    02:12 3/7/2025    00:40   Common Labs   Glucose 185  240  205    BUN 35  35  28    Creatinine 1.03  1.11  0.92    Sodium 137  140  138    Potassium 4.8  5.0  4.0    Chloride 106  108  106    Calcium 9.1  9.1  8.8    WBC 4.58  5.97  6.00    Hemoglobin 10.8  11.2  10.8    Hematocrit 36.2  37.6  35.6    Platelets 156  162  170           PFT:not a valid study     Low dose lung cancer screening:NA     Previous chest imaging:     CT Chest 8/1/24     FINDINGS:  Soft tissue windows demonstrate no adenopathy within the chest. The  thyroid gland is unremarkable. The heart is normal in size. The aorta is  normal in caliber. There are no pleural or pericardial effusions. Lung  windows redemonstrate airspace disease in the right upper lobe with  volume loss felt to reflect posttreatment related change. Scarring is  present in the anterior left upper lobe and right middle lobe. There is  airspace disease in both lung bases similar in appearance to the prior  exam.. The visualized upper abdomen is unremarkable. Bone windows reveal  no lytic or destructive lesions.     IMPRESSION:  Airspace disease with volume loss in the right upper lobe  similar in appearance to the prior exam felt to reflect posttreatment  related change. Continued follow-up is recommended.        This report was finalized on 8/1/2024 4:12 PM by Gianfranco Nuñez M.D..     Alpha-1 antitrypsin screening:MM     STOP-Bang Score:   NA  [place] Sleepiness Scale:   NA      ABG:    pH No results found for: \"PHART\"   pO2 No results found for: \"PO2ART\"   pCO2 No results found for: \"VJO8ULT\"   HCO3 No results found for: \"XRI7RCK\"                      Assessment and Plan    Problem List Items Addressed This Visit          Hematology and Neoplasia    Non-small cell cancer of right lung "    Relevant Medications    ipratropium-albuterol (DUO-NEB) 0.5-2.5 mg/3 ml nebulizer    Budeson-Glycopyrrol-Formoterol (Breztri Aerosphere) 160-9-4.8 MCG/ACT aerosol inhaler    albuterol sulfate  (90 Base) MCG/ACT inhaler       Pulmonary and Pneumonias    Chronic obstructive pulmonary disease - Primary    Overview   Added automatically from request for surgery [id number]         Relevant Medications    predniSONE (DELTASONE) 20 MG tablet    ipratropium-albuterol (DUO-NEB) 0.5-2.5 mg/3 ml nebulizer    Budeson-Glycopyrrol-Formoterol (Breztri Aerosphere) 160-9-4.8 MCG/ACT aerosol inhaler    albuterol sulfate  (90 Base) MCG/ACT inhaler       Tobacco    Former smoker     Other Visit Diagnoses         Chronic respiratory failure with hypoxia and hypercapnia                Mayra Hays  reports that she quit smoking about 10 years ago. Her smoking use included cigarettes. She started smoking about 27 years ago. She has a 50.2 pack-year smoking history. She has been exposed to tobacco smoke. She has never used smokeless tobacco.      Continue Breztri BID.  Continue albuterol inhaler as needed.  Continue duonebs as needed.     Sent in refills.    Sent in prednisone for wheezing.    She is using supplemental oxygen.  She reports that she is waiting on her new DME company to bring her new equipment.      Continue Imaging per oncology recommendations.    Completed radiation and is on maintenance chemotherapy with Durvalumab.          Follow Up   Return in about 6 months (around 11/15/2025).  Patient was given instructions and counseling regarding her condition or for health maintenance advice. Please see specific information pulled into the AVS if appropriate.

## 2025-05-16 LAB
FOLATE SERPL-MCNC: 12.3 NG/ML (ref 4.78–24.2)
VIT B12 BLD-MCNC: 365 PG/ML (ref 211–946)

## 2025-05-27 ENCOUNTER — PATIENT OUTREACH (OUTPATIENT)
Dept: ONCOLOGY | Facility: CLINIC | Age: 80
End: 2025-05-27
Payer: MEDICARE

## 2025-05-30 ENCOUNTER — HOSPITAL ENCOUNTER (OUTPATIENT)
Dept: CT IMAGING | Facility: HOSPITAL | Age: 80
Discharge: HOME | End: 2025-05-30
Payer: MEDICARE

## 2025-05-30 DIAGNOSIS — C34.11 PRIMARY CANCER OF RIGHT UPPER LOBE OF LUNG: ICD-10-CM

## 2025-05-30 PROCEDURE — 71260 CT THORAX DX C+: CPT

## 2025-05-30 PROCEDURE — 25510000001 IOPAMIDOL 61 % SOLUTION: Performed by: INTERNAL MEDICINE

## 2025-05-30 RX ORDER — IOPAMIDOL 612 MG/ML
100 INJECTION, SOLUTION INTRAVASCULAR
Status: COMPLETED | OUTPATIENT
Start: 2025-05-30 | End: 2025-05-30

## 2025-05-30 RX ADMIN — IOPAMIDOL 88 ML: 612 INJECTION, SOLUTION INTRAVENOUS at 16:12

## 2025-06-03 ENCOUNTER — DISEASE STATE MANAGEMENT VISIT (OUTPATIENT)
Dept: PHARMACY | Facility: HOSPITAL | Age: 80
End: 2025-06-03
Payer: MEDICARE

## 2025-06-23 ENCOUNTER — PATIENT OUTREACH (OUTPATIENT)
Dept: ONCOLOGY | Facility: CLINIC | Age: 80
End: 2025-06-23
Payer: MEDICARE

## 2025-06-23 NOTE — SIGNIFICANT NOTE
Call received from patient regarding request for transportation assistance for upcoming 6/26 port flush visit at the hospital. NN followed up with patient's daughter & Hailey DURHAM. Per Hailey, patient/daughter have yet to hear anything back since filling out and returning financial assistance paperwork. And, patient sometimes needs transportation assistance (by way of gas cards) daughter is unavailable to help patient get to appointments, due to her work, and she has to rely on other family/friends for trips in to office visits. Reassured patient and daughter that NN will check on financial assistance status and gas card qualification. As well as possibly combining port flush appt (moved from 6/26) to be same day as COPD clinic follow up, on for 7/9. Patient and daughter both verbalized understanding and are in agreement with plan.

## 2025-06-30 ENCOUNTER — PATIENT OUTREACH (OUTPATIENT)
Dept: ONCOLOGY | Facility: CLINIC | Age: 80
End: 2025-06-30
Payer: MEDICARE

## 2025-06-30 NOTE — SIGNIFICANT NOTE
Patient and her daughter (POALEJANDRINA), Hailey, called regarding travel assistance. After reviewing the chart, it was noted that Candace submitted an application to [employer] in April. I contacted [employer] and confirmed that a gift card in the amount of $200-$250 was sent to the patient in May. Both the patient and Hailey are unsure whether it was received. Hailey agreed to call [employer] to follow up with this. I gave Hailey [employer] contact information.

## 2025-07-08 ENCOUNTER — PATIENT OUTREACH (OUTPATIENT)
Dept: RADIATION ONCOLOGY | Facility: HOSPITAL | Age: 80
End: 2025-07-08
Payer: MEDICARE

## 2025-07-08 NOTE — SIGNIFICANT NOTE
Call was transferred to me regarding the patient following up on resources. I explained to the patient that I spoke with her POA, Hailey, both today and last week. Hailey is supposed to follow up with [employer] to ensure they sent the card. Mayra continues to state that she does not remember. I clarified that the next step is for her daughter to contact [employer] I also informed her that Jocelyn and I will be reviewing the resources she has already received and will follow up with her at a later date if we can assist her further. Patient voiced understanding

## 2025-07-08 NOTE — SIGNIFICANT NOTE
Received another call from Mayra requesting additional resources. KATHERINE returned the call and also reached out to Hailey (HERMINIO) to follow up on whether she had contacted [employer] regarding the $250 card that was reportedly sent in May. Hailey previously stated she had not yet called but planned to do so. In my last conversation, Mayra, Hailey, and I discussed the matter, but Mayra could not recall whether she received the card. Hailey had agreed to follow up with [employer] to confirm what type of card was sent. Per Jocelyn’s note in Epic, Mayra had already confirmed receipt of the card on 5/27. But both Hailey and Mayra do not remember. KATHERINE will investigate the funds already awarded and confirm with supervisor Jocelyn what else if any that we can do for the patient.

## 2025-07-09 ENCOUNTER — HOSPITAL ENCOUNTER (OUTPATIENT)
Dept: PULMONOLOGY | Facility: HOSPITAL | Age: 80
Discharge: HOME | End: 2025-07-09
Payer: MEDICARE

## 2025-07-09 ENCOUNTER — INFUSION (OUTPATIENT)
Dept: ONCOLOGY | Facility: HOSPITAL | Age: 80
End: 2025-07-09
Payer: MEDICARE

## 2025-07-09 VITALS
WEIGHT: 250 LBS | BODY MASS INDEX: 41.6 KG/M2 | SYSTOLIC BLOOD PRESSURE: 126 MMHG | HEART RATE: 74 BPM | DIASTOLIC BLOOD PRESSURE: 78 MMHG | OXYGEN SATURATION: 91 %

## 2025-07-09 VITALS
RESPIRATION RATE: 20 BRPM | OXYGEN SATURATION: 92 % | SYSTOLIC BLOOD PRESSURE: 151 MMHG | HEART RATE: 81 BPM | TEMPERATURE: 98.1 F | DIASTOLIC BLOOD PRESSURE: 72 MMHG

## 2025-07-09 DIAGNOSIS — J96.12 CHRONIC RESPIRATORY FAILURE WITH HYPOXIA AND HYPERCAPNIA: ICD-10-CM

## 2025-07-09 DIAGNOSIS — J44.9 CHRONIC OBSTRUCTIVE PULMONARY DISEASE, UNSPECIFIED COPD TYPE: Primary | ICD-10-CM

## 2025-07-09 DIAGNOSIS — Z87.891 FORMER SMOKER: ICD-10-CM

## 2025-07-09 DIAGNOSIS — C34.91 NON-SMALL CELL CANCER OF RIGHT LUNG: ICD-10-CM

## 2025-07-09 DIAGNOSIS — Z95.828 PORT-A-CATH IN PLACE: Primary | ICD-10-CM

## 2025-07-09 DIAGNOSIS — J96.11 CHRONIC RESPIRATORY FAILURE WITH HYPOXIA AND HYPERCAPNIA: ICD-10-CM

## 2025-07-09 PROCEDURE — 25010000002 HEPARIN LOCK FLUSH PER 10 UNITS: Performed by: NURSE PRACTITIONER

## 2025-07-09 PROCEDURE — 96523 IRRIG DRUG DELIVERY DEVICE: CPT

## 2025-07-09 PROCEDURE — G0463 HOSPITAL OUTPT CLINIC VISIT: HCPCS | Performed by: PHYSICIAN ASSISTANT

## 2025-07-09 RX ORDER — HEPARIN SODIUM (PORCINE) LOCK FLUSH IV SOLN 100 UNIT/ML 100 UNIT/ML
300 SOLUTION INTRAVENOUS ONCE
OUTPATIENT
Start: 2025-07-09

## 2025-07-09 RX ORDER — GUAIFENESIN 600 MG/1
1200 TABLET, EXTENDED RELEASE ORAL 2 TIMES DAILY PRN
Qty: 120 TABLET | Refills: 3 | Status: SHIPPED | OUTPATIENT
Start: 2025-07-09

## 2025-07-09 RX ORDER — SODIUM CHLORIDE 0.9 % (FLUSH) 0.9 %
20 SYRINGE (ML) INJECTION AS NEEDED
OUTPATIENT
Start: 2025-07-09

## 2025-07-09 RX ORDER — SODIUM CHLORIDE 0.9 % (FLUSH) 0.9 %
10 SYRINGE (ML) INJECTION AS NEEDED
OUTPATIENT
Start: 2025-07-09

## 2025-07-09 RX ORDER — HEPARIN SODIUM (PORCINE) LOCK FLUSH IV SOLN 100 UNIT/ML 100 UNIT/ML
500 SOLUTION INTRAVENOUS AS NEEDED
OUTPATIENT
Start: 2025-07-09

## 2025-07-09 RX ORDER — LIDOCAINE AND PRILOCAINE 25; 25 MG/G; MG/G
1 CREAM TOPICAL AS NEEDED
Qty: 5 G | Refills: 3 | Status: SHIPPED | OUTPATIENT
Start: 2025-07-09

## 2025-07-09 RX ORDER — NYSTATIN 100000 [USP'U]/G
POWDER TOPICAL 4 TIMES DAILY
Qty: 60 G | Refills: 3 | Status: SHIPPED | OUTPATIENT
Start: 2025-07-09

## 2025-07-09 RX ORDER — ENSIFENTRINE 3 MG/2.5ML
3 SUSPENSION RESPIRATORY (INHALATION) 2 TIMES DAILY
COMMUNITY

## 2025-07-09 RX ORDER — GUAIFENESIN 600 MG/1
1200 TABLET, EXTENDED RELEASE ORAL 2 TIMES DAILY PRN
Qty: 120 TABLET | Refills: 3 | Status: SHIPPED | OUTPATIENT
Start: 2025-07-09 | End: 2025-07-09 | Stop reason: SDUPTHER

## 2025-07-09 RX ORDER — HEPARIN SODIUM (PORCINE) LOCK FLUSH IV SOLN 100 UNIT/ML 100 UNIT/ML
500 SOLUTION INTRAVENOUS AS NEEDED
Status: DISCONTINUED | OUTPATIENT
Start: 2025-07-09 | End: 2025-07-09 | Stop reason: HOSPADM

## 2025-07-09 RX ORDER — NYSTATIN 100000 [USP'U]/G
POWDER TOPICAL AS NEEDED
Qty: 60 G | Refills: 3 | Status: SHIPPED | OUTPATIENT
Start: 2025-07-09 | End: 2025-07-09

## 2025-07-09 RX ORDER — ALBUTEROL SULFATE 90 UG/1
2 INHALANT RESPIRATORY (INHALATION) EVERY 4 HOURS PRN
Qty: 54 G | Refills: 3 | Status: SHIPPED | OUTPATIENT
Start: 2025-07-09

## 2025-07-09 RX ORDER — LIDOCAINE AND PRILOCAINE 25; 25 MG/G; MG/G
1 CREAM TOPICAL AS NEEDED
Qty: 5 G | Refills: 3 | Status: SHIPPED | OUTPATIENT
Start: 2025-07-09 | End: 2025-07-09 | Stop reason: SDUPTHER

## 2025-07-09 RX ORDER — SODIUM CHLORIDE 0.9 % (FLUSH) 0.9 %
10 SYRINGE (ML) INJECTION AS NEEDED
Status: DISCONTINUED | OUTPATIENT
Start: 2025-07-09 | End: 2025-07-09 | Stop reason: HOSPADM

## 2025-07-09 RX ORDER — BUDESONIDE, GLYCOPYRROLATE, AND FORMOTEROL FUMARATE 160; 9; 4.8 UG/1; UG/1; UG/1
2 AEROSOL, METERED RESPIRATORY (INHALATION) 2 TIMES DAILY
Qty: 32.1 G | Refills: 3 | Status: SHIPPED | OUTPATIENT
Start: 2025-07-09

## 2025-07-09 RX ADMIN — Medication 10 ML: at 13:20

## 2025-07-09 RX ADMIN — HEPARIN 500 UNITS: 100 SYRINGE at 13:20

## 2025-07-09 NOTE — PROGRESS NOTES
Subjective      Chief Complaint  COPD    Subjective      History of Present Illness  Mayra Hays is a 79 y.o. female who presents today to [facility] with past medical history of chronic HFpEF, essential hypertension, hyperlipidemia, paroxysmal atrial fibrillation, CKD, type II diabetes mellitus, iron deficiency anemia, non-small cell lung cancer of right lung on chemotherapy, COPD, chronic hypoxic respiratory failure, and tobacco abuse who presents today for COPD. This visit is a follow up appointment.     COPD:  Patient presents today for her follow-up appointment. She reports that she is doing well currently. She feels that her breathing is currently at baseline. She states that she has shortness of breath with exertion but states that this hasn't worsened. She continues to use Breztri twice daily. She states that she received Ohtuvayre in the mail and has been using twice daily and feels that she has been able to tell some improvement in her breathing. She has albuterol and DuoNeb treatments to use as needed. She has a stationary concentrator which she utilizes at night and as needed during the day. She reports that she is needing a portable concentrator to use when she isn't at home. This was previously ordered but patient reports that she hasn't received from the [facility] [employer]. She continues to follow closely with oncology due to NSCLC of right lung which she receives maintenance chemotherapy with Durvalumab.     Current Outpatient Medications:     albuterol sulfate  (90 Base) MCG/ACT inhaler, Inhale 2 puffs by mouth Every 4 (Four) Hours As Needed for Wheezing., Disp: 54 g, Rfl: 3    apixaban (ELIQUIS) 5 MG tablet tablet, Take 1 tablet by mouth Every 12 (Twelve) Hours., Disp: 180 tablet, Rfl: 3    atorvastatin (LIPITOR) 20 MG tablet, Take 1 tablet by mouth Daily., Disp: , Rfl:     Budeson-Glycopyrrol-Formoterol (Breztri Aerosphere) 160-9-4.8 MCG/ACT aerosol inhaler,  "Inhale 2 puffs by mouth 2 (Two) Times a Day., Disp: 32.1 g, Rfl: 3    Ensifentrine (Ohtuvayre) 3 MG/2.5ML nebulizer solution, Take 2.5 mL by nebulization 2 (Two) Times a Day., Disp: , Rfl:     esomeprazole (nexIUM) 20 MG capsule, Take 1 capsule by mouth Daily., Disp: , Rfl:     guaiFENesin (Mucinex) 600 MG 12 hr tablet, Take 2 tablets by mouth 2 (Two) Times a Day As Needed for Cough or Congestion., Disp: 120 tablet, Rfl: 3    HYDROcodone-acetaminophen (NORCO) 5-325 MG per tablet, , Disp: , Rfl:     hydrOXYzine pamoate (VISTARIL) 25 MG capsule, Take 1 capsule by mouth Every Night., Disp: , Rfl:     ipratropium-albuterol (DUO-NEB) 0.5-2.5 mg/3 ml nebulizer, Take 1 vial (3 mL) by nebulization Every 4 (Four) Hours As Needed for Breathing., Disp: 360 mL, Rfl: 3    lidocaine-prilocaine (EMLA) 2.5-2.5 % cream, Apply 1 Application topically to the appropriate area as directed As Needed for Mild Pain., Disp: 5 g, Rfl: 3    lisinopril (PRINIVIL,ZESTRIL) 10 MG tablet, Take 1 tablet by mouth Daily., Disp: 30 tablet, Rfl: 2    nystatin (MYCOSTATIN) 780614 UNIT/GM powder, Apply  topically to the appropriate area as directed 4 (Four) Times a Day., Disp: 60 g, Rfl: 3  No current facility-administered medications for this encounter.      Allergies   Allergen Reactions    Paclitaxel Anaphylaxis       Objective     Objective   Vital Signs:  /78   Pulse 74   Wt 113 kg (250 lb)   SpO2 91%   BMI 41.60 kg/m²   Estimated body mass index is 41.6 kg/m² as calculated from the following:    Height as of 5/15/25: 165.1 cm (65\").    Weight as of this encounter: 113 kg (250 lb).    Past Medical History:   Diagnosis Date    Arthritis     CHF (congestive heart failure)     Chronic anticoagulation     Eliquis    Chronic kidney disease     stage II/IIIa    Collapsed lung     COPD (chronic obstructive pulmonary disease)     Diabetes mellitus     TYPE 2    Elevated cholesterol     GERD (gastroesophageal reflux disease)     Hyperlipidemia     " Hypertension     Non-small cell lung cancer RUL     Sleep apnea     non compliant with CPAP    Stroke 2019    Wears eyeglasses      Past Surgical History:   Procedure Laterality Date    BRONCHOSCOPY Bilateral 02/27/2023    Procedure: BRONCHOSCOPY WITH ENDOBRONCHIAL ULTRASOUND;  Surgeon: Abdulkadir Peter MD;  Location:  [facility];  Service: Pulmonary;  Laterality: Bilateral;    BRONCHOSCOPY WITH ION ROBOTIC ASSIST N/A 9/6/2023    Procedure: BRONCHOSCOPY WITH ION ROBOT AND EBUS;  Surgeon: John Clemens MD;  Location:  [facility];  Service: Robotics - Pulmonary;  Laterality: N/A;  Ion cath #6 - 0032,  #6 - 0030, cath guide # 0077. EBUS scope removed with balloon intact.    CARDIAC CATHETERIZATION N/A 08/22/2017    Procedure: Left Heart Cath;  Surgeon: Jatinder Matias MD;  [facility]:  [facility];  Service:     CARDIAC CATHETERIZATION N/A 11/22/2021    Procedure: Left Heart Cath;  Surgeon: Tolu Steinberg MD;  [facility]:  [facility];  Service: Cardiology;  Laterality: N/A;    COLONOSCOPY      ENDOSCOPY      GALLBLADDER SURGERY      PORTACATH PLACEMENT N/A 9/29/2023    Procedure: INSERTION OF PORTACATH;  Surgeon: Petr Velásquez MD;  Location:  [facility];  Service: General;  Laterality: N/A;    VENTRAL HERNIA REPAIR N/A 05/14/2020    Procedure: VENTRAL HERNIA REPAIR LAPAROSCOPIC WITH DAVINCI ROBOT;  Surgeon: Petr Velásquez MD;  Location:  [facility];  Service: DaVinci;  Laterality: N/A;     Social History     Socioeconomic History    Marital status: Single    Number of children: 6   Tobacco Use    Smoking status: Former     Current packs/day: 0.00     Average packs/day: 3.0 packs/day for 16.7 years (50.2 ttl pk-yrs)     Types: Cigarettes     Start date: 4/3/1998     Quit date: 1/1/2015     Years since quitting: 10.5     Passive exposure: Past    Smokeless tobacco: Never   Vaping Use    Vaping status: Never Used   Substance and Sexual Activity    Alcohol use: No  "   Drug use: No    Sexual activity: Never        Physical Exam  Constitutional:       General: She is awake.      Appearance: Normal appearance. She is morbidly obese.   HENT:      Head: Normocephalic and atraumatic.      Nose: Nose normal.      Mouth/Throat:      Mouth: Mucous membranes are moist.      Pharynx: Oropharynx is clear.   Eyes:      Conjunctiva/sclera: Conjunctivae normal.      Pupils: Pupils are equal, round, and reactive to light.   Cardiovascular:      Rate and Rhythm: Normal rate and regular rhythm.      Pulses: Normal pulses.      Heart sounds: Normal heart sounds. No murmur heard.     No friction rub. No gallop.   Pulmonary:      Effort: Pulmonary effort is normal. No tachypnea, accessory muscle usage or respiratory distress.      Breath sounds: Normal breath sounds. No decreased breath sounds, wheezing, rhonchi or rales.   Musculoskeletal:         General: Normal range of motion.      Cervical back: Full passive range of motion without pain and normal range of motion.   Skin:     General: Skin is warm and dry.   Neurological:      General: No focal deficit present.      Mental Status: She is alert. Mental status is at baseline.   Psychiatric:         Mood and Affect: Mood normal.         Behavior: Behavior normal. Behavior is cooperative.         Thought Content: Thought content normal.          Result Review :  The following labs and radiology results have been reviewed.    Lab Review:   No results found for: \"FEV1\", \"FVC\", \"UAF4UOY\", \"TLC\", \"DLCO\"  Infusion on 05/15/2025   Component Date Value Ref Range Status    Glucose 05/15/2025 168 (H)  65 - 99 mg/dL Final    BUN 05/15/2025 19  8 - 23 mg/dL Final    Creatinine 05/15/2025 1.00  0.57 - 1.00 mg/dL Final    Sodium 05/15/2025 140  136 - 145 mmol/L Final    Potassium 05/15/2025 4.0  3.5 - 5.2 mmol/L Final    Chloride 05/15/2025 104  98 - 107 mmol/L Final    CO2 05/15/2025 25.5  22.0 - 29.0 mmol/L Final    Calcium 05/15/2025 9.0  8.6 - 10.5 mg/dL " Final    Total Protein 05/15/2025 7.5  6.0 - 8.5 g/dL Final    Albumin 05/15/2025 4.1  3.5 - 5.2 g/dL Final    ALT (SGPT) 05/15/2025 13  1 - 33 U/L Final    AST (SGOT) 05/15/2025 15  1 - 32 U/L Final    Alkaline Phosphatase 05/15/2025 98  39 - 117 U/L Final    Total Bilirubin 05/15/2025 0.3  0.0 - 1.2 mg/dL Final    Globulin 05/15/2025 3.4  gm/dL Final    A/G Ratio 05/15/2025 1.2  g/dL Final    BUN/Creatinine Ratio 05/15/2025 19.0  7.0 - 25.0 Final    Anion Gap 05/15/2025 10.5  5.0 - 15.0 mmol/L Final    eGFR 05/15/2025 57.8 (L)  >60.0 mL/min/1.73 Final    Iron 05/15/2025 72  37 - 145 mcg/dL Final    Iron Saturation (TSAT) 05/15/2025 19 (L)  20 - 50 % Final    Transferrin 05/15/2025 254  200 - 360 mg/dL Final    TIBC 05/15/2025 378  298 - 536 mcg/dL Final    Folate 05/15/2025 12.30  4.78 - 24.20 ng/mL Final    Ferritin 05/15/2025 78.59  13.00 - 150.00 ng/mL Final    Vitamin B-12 05/15/2025 365  211 - 946 pg/mL Final    WBC 05/15/2025 5.87  3.40 - 10.80 10*3/mm3 Final    RBC 05/15/2025 4.47  3.77 - 5.28 10*6/mm3 Final    Hemoglobin 05/15/2025 11.6 (L)  12.0 - 15.9 g/dL Final    Hematocrit 05/15/2025 38.2  34.0 - 46.6 % Final    MCV 05/15/2025 85.5  79.0 - 97.0 fL Final    MCH 05/15/2025 26.0 (L)  26.6 - 33.0 pg Final    MCHC 05/15/2025 30.4 (L)  31.5 - 35.7 g/dL Final    RDW 05/15/2025 13.4  12.3 - 15.4 % Final    RDW-SD 05/15/2025 41.8  37.0 - 54.0 fl Final    MPV 05/15/2025 11.3  6.0 - 12.0 fL Final    Platelets 05/15/2025 195  140 - 450 10*3/mm3 Final    Neutrophil % 05/15/2025 57.1  42.7 - 76.0 % Final    Lymphocyte % 05/15/2025 24.7  19.6 - 45.3 % Final    Monocyte % 05/15/2025 12.1 (H)  5.0 - 12.0 % Final    Eosinophil % 05/15/2025 4.4  0.3 - 6.2 % Final    Basophil % 05/15/2025 1.0  0.0 - 1.5 % Final    Immature Grans % 05/15/2025 0.7 (H)  0.0 - 0.5 % Final    Neutrophils, Absolute 05/15/2025 3.35  1.70 - 7.00 10*3/mm3 Final    Lymphocytes, Absolute 05/15/2025 1.45  0.70 - 3.10 10*3/mm3 Final    Monocytes,  Absolute 05/15/2025 0.71  0.10 - 0.90 10*3/mm3 Final    Eosinophils, Absolute 05/15/2025 0.26  0.00 - 0.40 10*3/mm3 Final    Basophils, Absolute 05/15/2025 0.06  0.00 - 0.20 10*3/mm3 Final    Immature Grans, Absolute 05/15/2025 0.04  0.00 - 0.05 10*3/mm3 Final    nRBC 05/15/2025 0.0  0.0 - 0.2 /100 WBC Final        Imaging Results (Most Recent)       None            Radiology Review:   Imaging Results (Last 72 Hours)       ** No results found for the last 72 hours. **            Reviewed CT chest with contrast from 5/30/2025  Narrative & Impression   EXAM: CT CHEST W CONTRAST DIAGNOSTIC-      CLINICAL INDICATION:NSCLC, completed tx; C34.11-Malignant neoplasm of  upper lobe, right bronchus or lung      COMPARISON: 3/20/2025     TECHNIQUE: Multiple axial CT images were obtained from lung apex through  upper abdomen WITH administration of IV contrast. Reformatted images in  the coronal and/or sagittal plane(s) were generated from the axial data  set to facilitate diagnostic accuracy and/or surgical planning.     Radiation dose reduction techniques were utilized per ALARA protocol.  Automated exposure control was initiated through either or CareDoTier 1 Performance or  DoseRigFlatStack software packages by  protocol.    DOSE (DLP mGy-cm):        FINDINGS:     LUNGS: Again noted is a 4.1 cm consolidative lesion in the right upper  lobe.     HEART: Cardiomegaly     MEDIASTINUM: No masses. No enlarged lymph nodes.  No fluid collections.     PLEURA: No pleural effusion. No pleural mass or abnormal calcification.  No pneumothorax.     VASCULATURE: No evidence of aneurysm.      BONES: No acute bony abnormality.     VISUALIZED UPPER ABDOMEN:The upper abdomen is unremarkable as  visualized.     Other: None.     IMPRESSION:     1. Dense consolidative opacity right upper lobe unchanged from the  previous exam  2. Cardiomegaly  3. No new pulmonary pathology.           This report was finalized on 5/30/2025 4:27 PM by Dr. Mauricio Santiago MD.      Reviewed spirometry from 08/13/2024      Assessment / Plan         Assessment   Diagnoses and all orders for this visit:    1. Chronic obstructive pulmonary disease, unspecified COPD type [J44.9] (Primary)  Alpha-1 genotype is normal (M/M).  Continue albuterol inhaler as needed.  Continue DuoNeb treatments as needed.  Continue Mucinex tablets as needed.  Continue Breztri inhaler 2 puffs twice daily.  Continue Ohtuvayre nebs twice daily.     -     Budeson-Glycopyrrol-Formoterol (Breztri Aerosphere) 160-9-4.8 MCG/ACT aerosol inhaler; Inhale 2 puffs by mouth 2 (Two) Times a Day.  Dispense: 32.1 g; Refill: 3  -     albuterol sulfate  (90 Base) MCG/ACT inhaler; Inhale 2 puffs by mouth Every 4 (Four) Hours As Needed for Wheezing.  Dispense: 54 g; Refill: 3  -     guaiFENesin (Mucinex) 600 MG 12 hr tablet; Take 2 tablets by mouth 2 (Two) Times a Day As Needed for Cough or Congestion.  Dispense: 120 tablet; Refill: 3    2. Chronic respiratory failure with hypoxia and hypercapnia  Patient was previously trialed on BiPAP therapy during previoushospitalization but wasn't able to tolerate.  Has supplemental oxygen supplies to use as needed (stationary oxygen concentrator and large tanks)  Previously ordered rechargeable concentrator but patient hasn't received. Discussed with [facility] and has orders for small portable tanks. Will send new order for rechargeable POC.     -     Oxygen Therapy    3. Former smoker  Mayra Hays  reports that she quit smoking about 10 years ago. Her smoking use included cigarettes. She started smoking about 27 years ago. She has a 50.2 pack-year smoking history. She has been exposed to tobacco smoke. She has never used smokeless tobacco.     4. Non-small cell cancer of right lung  Follows closely with oncology. Completed radiation and remains on maintenance chemotherapy with Durvalumab.  Previous CT imaging from 5/30/2025 revealed stable opacity of right upper lobe. Continue serial  imaging as deemed necessary per oncology.     -     lidocaine-prilocaine (EMLA) 2.5-2.5 % cream; Apply 1 Application topically to the appropriate area as directed As Needed for Mild Pain.  Dispense: 5 g; Refill: 3  -     nystatin (MYCOSTATIN) 162326 UNIT/GM powder; Apply  topically to the appropriate area as directed 4 (Four) Times a Day.  Dispense: 60 g; Refill: 3    Medications Discontinued During This Encounter   Medication Reason    albuterol sulfate  (90 Base) MCG/ACT inhaler Duplicate order    Budeson-Glycopyrrol-Formoterol (Breztri Aerosphere) 160-9-4.8 MCG/ACT aerosol inhaler Duplicate order    ipratropium-albuterol (DUO-NEB) 0.5-2.5 mg/3 ml nebulizer Patient Reported Not Taking    metFORMIN (GLUCOPHAGE) 500 MG tablet *Therapy completed    predniSONE (DELTASONE) 20 MG tablet *Therapy completed    lidocaine-prilocaine (EMLA) 2.5-2.5 % cream Reorder    guaiFENesin (Mucinex) 600 MG 12 hr tablet *Therapy completed    albuterol sulfate  (90 Base) MCG/ACT inhaler Reorder    Budeson-Glycopyrrol-Formoterol (Breztri Aerosphere) 160-9-4.8 MCG/ACT aerosol inhaler Reorder    nystatin (MYCOSTATIN) 392877 UNIT/GM powder *Therapy completed    nystatin (MYCOSTATIN) 676565 UNIT/GM powder Reorder    lidocaine-prilocaine (EMLA) 2.5-2.5 % cream Reorder    guaiFENesin (Mucinex) 600 MG 12 hr tablet Reorder     New Medications Ordered This Visit   Medications    Budeson-Glycopyrrol-Formoterol (Breztri Aerosphere) 160-9-4.8 MCG/ACT aerosol inhaler     Sig: Inhale 2 puffs by mouth 2 (Two) Times a Day.     Dispense:  32.1 g     Refill:  3     Note from CoverMyMeds suggested to bill under Medicare Part D (covered)    albuterol sulfate  (90 Base) MCG/ACT inhaler     Sig: Inhale 2 puffs by mouth Every 4 (Four) Hours As Needed for Wheezing.     Dispense:  54 g     Refill:  3    lidocaine-prilocaine (EMLA) 2.5-2.5 % cream     Sig: Apply 1 Application topically to the appropriate area as directed As Needed for Mild  Pain.     Dispense:  5 g     Refill:  3    nystatin (MYCOSTATIN) 624035 UNIT/GM powder     Sig: Apply  topically to the appropriate area as directed 4 (Four) Times a Day.     Dispense:  60 g     Refill:  3    guaiFENesin (Mucinex) 600 MG 12 hr tablet     Sig: Take 2 tablets by mouth 2 (Two) Times a Day As Needed for Cough or Congestion.     Dispense:  120 tablet     Refill:  3     I spent 30 minutes caring for Mayra on this date of service. This time includes time spent by me in the following activities:preparing for the visit, reviewing tests, obtaining and/or reviewing a separately obtained history, performing a medically appropriate examination and/or evaluation , counseling and educating the patient/family/caregiver, ordering medications, tests, or procedures, referring and communicating with other health care professionals , documenting information in the medical record, independently interpreting results and communicating that information with the patient/family/caregiver, and care coordination    Follow Up   Return in about 6 months (around 1/9/2026), or if symptoms worsen or fail to improve, for Next scheduled follow up.    Patient was given instructions and counseling regarding her condition or for health maintenance advice. Please see specific information pulled into the AVS if appropriate.       This document has been electronically signed by Jocelyn Mendoza PA-C   July 9, 2025 17:21 EDT    Dictated Utilizing Dragon Dictation: Part of this note may be an electronic transcription/translation of spoken language to printed text using the Dragon Dictation System.

## 2025-07-09 NOTE — PROGRESS NOTES
[facility]  COPD Management       Mayra Hays is a 79 y.o. female seen in the [facility] for COPD. Current maintenance regimen includes Breztri, Ohtuvayre, PRN albuterol HFA, and PRN Duonebs. Patient is a former smoker that quit about 5-6 years ago. Of note, patient does have non-small cell lung cancer and is presently getting chemotherapy.     Patient has a poor understanding of her medications and is not sure what she takes on a daily basis. Her daughter helps with her medications but was unable to come to her appointment today. Patient thinks that she received Ohtuvayre in mail. She knows she has been doing a breathing treatment twice a day but this could also be Duonebs. She reports using her albuterol HFA twice a day as well.    Patient reports that she has not received her portable oxygen yet.      Past Medical History:   Diagnosis Date    Arthritis     CHF (congestive heart failure)     Chronic anticoagulation     Eliquis    Chronic kidney disease     stage II/IIIa    Collapsed lung     COPD (chronic obstructive pulmonary disease)     Diabetes mellitus     TYPE 2    Elevated cholesterol     GERD (gastroesophageal reflux disease)     Hyperlipidemia     Hypertension     Non-small cell lung cancer RUL     Sleep apnea     non compliant with CPAP    Stroke 2019    Wears eyeglasses      Social History     Socioeconomic History    Marital status: Single    Number of children: 6   Tobacco Use    Smoking status: Former     Current packs/day: 0.00     Average packs/day: 3.0 packs/day for 16.7 years (50.2 ttl pk-yrs)     Types: Cigarettes     Start date: 4/3/1998     Quit date: 1/1/2015     Years since quitting: 10.5     Passive exposure: Past    Smokeless tobacco: Never   Vaping Use    Vaping status: Never Used   Substance and Sexual Activity    Alcohol use: No    Drug use: No    Sexual activity: Never     Paclitaxel    Current Outpatient Medications:     albuterol sulfate   (90 Base) MCG/ACT inhaler, Inhale 2 puffs by mouth Every 4 (Four) Hours As Needed for Wheezing., Disp: 18 g, Rfl: 3    albuterol sulfate  (90 Base) MCG/ACT inhaler, Inhale 2 puffs by mouth Every 4 (Four) Hours As Needed for Wheezing., Disp: 18 g, Rfl: 3    apixaban (ELIQUIS) 5 MG tablet tablet, Take 1 tablet by mouth Every 12 (Twelve) Hours., Disp: 180 tablet, Rfl: 3    atorvastatin (LIPITOR) 20 MG tablet, Take 1 tablet by mouth Daily., Disp: , Rfl:     Budeson-Glycopyrrol-Formoterol (Breztri Aerosphere) 160-9-4.8 MCG/ACT aerosol inhaler, Inhale 2 puffs by mouth 2 (Two) Times a Day., Disp: 32.1 g, Rfl: 3    Budeson-Glycopyrrol-Formoterol (Breztri Aerosphere) 160-9-4.8 MCG/ACT aerosol inhaler, Inhale 2 puffs by mouth 2 (Two) Times a Day., Disp: 32.1 g, Rfl: 3    esomeprazole (nexIUM) 20 MG capsule, Take 1 capsule by mouth Daily., Disp: , Rfl:     guaiFENesin (Mucinex) 600 MG 12 hr tablet, Take 2 tablets by mouth 2 (Two) Times a Day As Needed for Cough or Congestion., Disp: 120 tablet, Rfl: 3    HYDROcodone-acetaminophen (NORCO) 5-325 MG per tablet, , Disp: , Rfl:     hydrOXYzine pamoate (VISTARIL) 25 MG capsule, Take 1 capsule by mouth Every Night., Disp: , Rfl:     ipratropium-albuterol (DUO-NEB) 0.5-2.5 mg/3 ml nebulizer, Take 1 vial (3 mL) by nebulization Every 4 (Four) Hours As Needed for Breathing., Disp: 360 mL, Rfl: 3    ipratropium-albuterol (DUO-NEB) 0.5-2.5 mg/3 ml nebulizer, Take 3 mL by nebulization Every 4 (Four) Hours As Needed (Breathing)., Disp: 360 mL, Rfl: 3    lidocaine-prilocaine (EMLA) 2.5-2.5 % cream, Apply 1 Application topically to the appropriate area as directed As Needed for Mild Pain., Disp: 5 g, Rfl: 3    lisinopril (PRINIVIL,ZESTRIL) 10 MG tablet, Take 1 tablet by mouth Daily., Disp: 30 tablet, Rfl: 2    metFORMIN (GLUCOPHAGE) 500 MG tablet, Take 1 tablet by mouth 2 (Two) Times a Day As Needed (only takes if blood glucose is above 200). As needed, Disp: , Rfl:     nystatin  (MYCOSTATIN) [id number] UNIT/GM powder, Apply 1 Application topically to the appropriate area as directed 4 (Four) Times a Day., Disp: , Rfl:     predniSONE (DELTASONE) 20 MG tablet, Take 2 tablets by mouth Daily., Disp: 10 tablet, Rfl: 0  No current facility-administered medications for this encounter.    Facility-Administered Medications Ordered in Other Encounters:     heparin injection 500 Units, 500 Units, Intravenous, PRN, Malik, Bailee Sharmaine, APRN    sodium chloride 0.9 % flush 10 mL, 10 mL, Intravenous, PRN, Gilbert, Bailee Sharmaine, APRN      Vaccination Status   COVID 19: not interested in   Influenza: refused 2024; not interested in   Pneumococcal: not interested in   Zoster: not interested in     Drug-Drug Interactions: CNS depressants- Compazine + Hydroxyzine- reports dizziness when she has not eaten    Drug-Disease Interactions (non-cardioselective beta blockers): N/A    Patient Assistance: No issues; Fills COPD medications in apothecary    Inhaler Technique Observed? No  If yes, notes:     Treatment Goals: Risk Reduction and Symptom Control     Medication Assessment & Plan:     Patient to continue Breztri 2 puffs BID and Ohtuvayre.     Advised Pt on the importance of continuing maintenance inhaler regimen and the importance of rinsing mouth after ICS use.     Notified Jocelyn that patient has not received her portable oxygen.    Thank you for allowing me to participate in the care of your patient,    Maylin Tariq, PharmD  7/9/2025  13:57 EDT

## 2025-07-11 ENCOUNTER — PATIENT OUTREACH (OUTPATIENT)
Dept: RADIATION ONCOLOGY | Facility: HOSPITAL | Age: 80
End: 2025-07-11
Payer: MEDICARE

## 2025-07-14 ENCOUNTER — PATIENT OUTREACH (OUTPATIENT)
Dept: RADIATION ONCOLOGY | Facility: HOSPITAL | Age: 80
End: 2025-07-14
Payer: MEDICARE

## 2025-07-14 NOTE — SIGNIFICANT NOTE
Called and spoke to Hailey and informed her that her mother called and I was returning the call. I let Hailey know we would be giving her a gas card tomorrow when she comes for appointment. Hailey voiced understanding and agreed to let Mayra know.

## 2025-07-15 ENCOUNTER — OFFICE VISIT (OUTPATIENT)
Dept: ONCOLOGY | Facility: CLINIC | Age: 80
End: 2025-07-15
Payer: MEDICARE

## 2025-07-15 ENCOUNTER — PATIENT OUTREACH (OUTPATIENT)
Dept: RADIATION ONCOLOGY | Facility: HOSPITAL | Age: 80
End: 2025-07-15
Payer: MEDICARE

## 2025-07-15 ENCOUNTER — LAB (OUTPATIENT)
Dept: ONCOLOGY | Facility: CLINIC | Age: 80
End: 2025-07-15
Payer: MEDICARE

## 2025-07-15 VITALS
DIASTOLIC BLOOD PRESSURE: 65 MMHG | OXYGEN SATURATION: 90 % | RESPIRATION RATE: 20 BRPM | WEIGHT: 226.2 LBS | HEIGHT: 70 IN | SYSTOLIC BLOOD PRESSURE: 114 MMHG | HEART RATE: 103 BPM | BODY MASS INDEX: 32.38 KG/M2 | TEMPERATURE: 98 F

## 2025-07-15 DIAGNOSIS — R79.89 ELEVATED SERUM CREATININE: ICD-10-CM

## 2025-07-15 DIAGNOSIS — K90.49 MALABSORPTION DUE TO INTOLERANCE, NOT ELSEWHERE CLASSIFIED: ICD-10-CM

## 2025-07-15 DIAGNOSIS — K90.49 MALABSORPTION DUE TO INTOLERANCE, NOT ELSEWHERE CLASSIFIED: Primary | ICD-10-CM

## 2025-07-15 DIAGNOSIS — C34.11 PRIMARY CANCER OF RIGHT UPPER LOBE OF LUNG: Primary | ICD-10-CM

## 2025-07-15 DIAGNOSIS — C34.91 NON-SMALL CELL CANCER OF RIGHT LUNG: Primary | ICD-10-CM

## 2025-07-15 DIAGNOSIS — D50.9 IRON DEFICIENCY ANEMIA, UNSPECIFIED IRON DEFICIENCY ANEMIA TYPE: ICD-10-CM

## 2025-07-15 DIAGNOSIS — C34.91 NON-SMALL CELL CANCER OF RIGHT LUNG: ICD-10-CM

## 2025-07-15 DIAGNOSIS — R53.83 OTHER FATIGUE: ICD-10-CM

## 2025-07-15 LAB
ALBUMIN SERPL-MCNC: 4 G/DL (ref 3.5–5.2)
ALBUMIN/GLOB SERPL: 1.2 G/DL
ALP SERPL-CCNC: 113 U/L (ref 39–117)
ALT SERPL W P-5'-P-CCNC: 14 U/L (ref 1–33)
ANION GAP SERPL CALCULATED.3IONS-SCNC: 13.6 MMOL/L (ref 5–15)
AST SERPL-CCNC: 16 U/L (ref 1–32)
BASOPHILS # BLD AUTO: 0.08 10*3/MM3 (ref 0–0.2)
BASOPHILS NFR BLD AUTO: 0.8 % (ref 0–1.5)
BILIRUB SERPL-MCNC: 0.3 MG/DL (ref 0–1.2)
BUN SERPL-MCNC: 25.7 MG/DL (ref 8–23)
BUN/CREAT SERPL: 23.4 (ref 7–25)
CALCIUM SPEC-SCNC: 9 MG/DL (ref 8.6–10.5)
CHLORIDE SERPL-SCNC: 101 MMOL/L (ref 98–107)
CO2 SERPL-SCNC: 20.4 MMOL/L (ref 22–29)
CREAT SERPL-MCNC: 1.1 MG/DL (ref 0.57–1)
DEPRECATED RDW RBC AUTO: 42.6 FL (ref 37–54)
EGFRCR SERPLBLD CKD-EPI 2021: 51.5 ML/MIN/1.73
EOSINOPHIL # BLD AUTO: 0.04 10*3/MM3 (ref 0–0.4)
EOSINOPHIL NFR BLD AUTO: 0.4 % (ref 0.3–6.2)
ERYTHROCYTE [DISTWIDTH] IN BLOOD BY AUTOMATED COUNT: 13.4 % (ref 12.3–15.4)
FERRITIN SERPL-MCNC: 106 NG/ML (ref 13–150)
GLOBULIN UR ELPH-MCNC: 3.3 GM/DL
GLUCOSE SERPL-MCNC: 272 MG/DL (ref 65–99)
HCT VFR BLD AUTO: 37.6 % (ref 34–46.6)
HGB BLD-MCNC: 11.7 G/DL (ref 12–15.9)
IMM GRANULOCYTES # BLD AUTO: 0.17 10*3/MM3 (ref 0–0.05)
IMM GRANULOCYTES NFR BLD AUTO: 1.7 % (ref 0–0.5)
IRON 24H UR-MRATE: 68 MCG/DL (ref 37–145)
IRON SATN MFR SERPL: 17 % (ref 20–50)
LYMPHOCYTES # BLD AUTO: 0.79 10*3/MM3 (ref 0.7–3.1)
LYMPHOCYTES NFR BLD AUTO: 7.9 % (ref 19.6–45.3)
MCH RBC QN AUTO: 27.1 PG (ref 26.6–33)
MCHC RBC AUTO-ENTMCNC: 31.1 G/DL (ref 31.5–35.7)
MCV RBC AUTO: 87.2 FL (ref 79–97)
MONOCYTES # BLD AUTO: 0.21 10*3/MM3 (ref 0.1–0.9)
MONOCYTES NFR BLD AUTO: 2.1 % (ref 5–12)
NEUTROPHILS NFR BLD AUTO: 8.71 10*3/MM3 (ref 1.7–7)
NEUTROPHILS NFR BLD AUTO: 87.1 % (ref 42.7–76)
NRBC BLD AUTO-RTO: 0 /100 WBC (ref 0–0.2)
PLATELET # BLD AUTO: 194 10*3/MM3 (ref 140–450)
PMV BLD AUTO: 11.5 FL (ref 6–12)
POTASSIUM SERPL-SCNC: 4.4 MMOL/L (ref 3.5–5.2)
PROT SERPL-MCNC: 7.3 G/DL (ref 6–8.5)
RBC # BLD AUTO: 4.31 10*6/MM3 (ref 3.77–5.28)
SODIUM SERPL-SCNC: 135 MMOL/L (ref 136–145)
TIBC SERPL-MCNC: 393 MCG/DL (ref 298–536)
TRANSFERRIN SERPL-MCNC: 264 MG/DL (ref 200–360)
WBC NRBC COR # BLD AUTO: 10 10*3/MM3 (ref 3.4–10.8)

## 2025-07-15 PROCEDURE — 82746 ASSAY OF FOLIC ACID SERUM: CPT | Performed by: INTERNAL MEDICINE

## 2025-07-15 PROCEDURE — 83540 ASSAY OF IRON: CPT | Performed by: INTERNAL MEDICINE

## 2025-07-15 PROCEDURE — 85025 COMPLETE CBC W/AUTO DIFF WBC: CPT | Performed by: INTERNAL MEDICINE

## 2025-07-15 PROCEDURE — 84466 ASSAY OF TRANSFERRIN: CPT | Performed by: INTERNAL MEDICINE

## 2025-07-15 PROCEDURE — 80053 COMPREHEN METABOLIC PANEL: CPT | Performed by: INTERNAL MEDICINE

## 2025-07-15 PROCEDURE — 82607 VITAMIN B-12: CPT | Performed by: INTERNAL MEDICINE

## 2025-07-15 PROCEDURE — 82728 ASSAY OF FERRITIN: CPT | Performed by: INTERNAL MEDICINE

## 2025-07-15 NOTE — PROGRESS NOTES
Subjective     Date: 7/15/2025    Referring Provider  No ref. provider found    Chief Complaint  Symptomatic anemia   2. Non small Cell Lung Cancer  Cancer Staging   Stage IIIA (cT1c, cN2, cM0)        Subjective      Mayra Hays is a 79 y.o. female who presents today to [facility] for follow up.    HPI:   79 y.o. female with past medical history of diabetes mellitus type 2, hyperlipidemia, COPD, hypertension, atrial fibrillation on anticoagulation (Eliquis), h/o CVA and previous tobacco use presents for symptomatic anemia and NSCLC.    She is present today with her daughter, Hailey, who is assisting with history. Pt started work up for RUL lung nodule , since then noticed to have a decrease in her Hgb to 10.7 from normal in 2023. More recently, her Hgb /9 was 6.5, she was directed to ED where she received 1U PRBC.     Patient reports increased fatigue over the last several months. She reports no bleeding, however does report dark colored stool (melena) two days ago. Denies any other evidence of melena or hematochezia.     She has previously been diagnosed with iron deficiency (years ago) requiring oral iron supplements, but never received IV iron or bone marrow biopsy.    She and her daughter are unsure of her last colonoscopy and endoscopy history, think it may have occurred >5 years ago but unsure.    She denies recent weight loss, fever, chills,  night sweats.  Previous smoker, quit 5-6 years ago, smoked 3 packs/day X 30 years. Denies alcohol or drug use. Family history significant for son with leukemia, brothers with lung cancer.     Oncology History:  2023: low dose CT chest screenin.9 cm spiculated right upper lobe pulmonary nodule concerning for malignancy, PET/CT recommended.   2023: EBUS by Dr. Peter negative for malignancy for bronchial washing and FNA of station 10R  2023: PET/CT: Right upper lobe pulmonary nodule, more  posterior possible satellite nodules are postobstructive process measuring 2.1 cm with mSUV 14.5. Also with pretracheal region lymph node 2.6 cm.   6/29/2023: CT guided needle biopsy was non diagnostic, showed fibrosis and chronic inflammation.   8/9/2023: Lab work showed anemia with Hgb 6.5. Referred to ED. Bronchoscopy deferred.   9/6/2023: Navigational bronchoscopy performed by Dr. Clemens- Right upper lobe lung transbronchial biopsy revealed squamous cell carcinoma, lymph node at station 4R also involved with squamous cell carcinoma. PD-L1 TPS 60%. Patient not deemed a surgical candidate.  9/13-11/21/2023: Received weekly carboplatin  X 7 and radiation. Patient had a reaction to paclitaxel, it was omitted.   12/19/2023: Post treatment CT chest with improvement of masslike process RUL now 1.6 cm, suggest significant response to treatment. Mediastinal lymph nodes are now within normal limits of size.   January 26 2024-February 13 2025: Consolidation therapy with durvalumab     Ms. Hays presents today with her daughter, grand daughter, and great grand daughters. She is in a wheelchair. She lives alone with her grand son, able to perform her ADLs including cooking, cleaning.     Treatment history:        2.       Interval History 10/11/2023   Ms. Hays presents for follow up today, accompanied by her daughter in law. She started treatment with paclitaxel and carboplatin on 10/9, unfortunately had a anaphylactic reaction to taxol after 8 minutes of infusion causing her to go to the ED. Patient was awake and feeling back to herself at the time of ED visit. Carboplatin was not administered.     She reports doing well overall, no new symptoms. Started radiation therapy yesterday.      Interval History 10/24/2023   Ms. Hays presents for cycle 2 of carboplatin with concurrent radiation. Did well with first cycle. Denies any adverse effects including nausea, vomiting, diarrhea, neuropathy. Appetite is good. Radiation is  going well. No complaints today.  Denies any skin rash or bites.    Interval History 11/21/2023   Ms. Hays presents today for cycle 7 of carboplatin with concurrent radiation. She reports good tolerance thus far without neuropathy, nausea, vomiting, diarrhea. Appetite is stable.   Denies any GIB.    Interval History 01/11/2024   Ms. Hays presents after completion of therapy. She was admitted to the hospital 12/31 due to shortness of breath, found to have Afib with RVR and coronavirus HKU. She received inpatient antibiotics and steroids. Currently with improvement, still reports some fatigue.   We reviewed her last CT chest, which shows improvement after treatment. Discussed continuing immunotherapy.      Interval History 02/22/2024   Ms. Hays presents today for follow, prior to cycle 3 of maintenance durvalumab. Accompanied by her grand daughter's fiance. She reports improved energy. Denies any bleeding, nausea, vomiting, diarrhea, rash. On examination, noted to have petechia on bilateral distal lower extremities and R distal upper extremities, she believes these started today. Denies pruritus.     Interval History 03/21/2024    presents prior to C5 of consolidation therapy with durvalumab. She reports good tolerance to the last treatment, denies shortness of breath, diarrhea, nausea/vomiting, fatigue. She's been applying lotion to her legs, petechia has decreased, primarily just on her left leg now.     Interval History 04/18/2024   Ms. Hays presents prior to C7 of durvalumab. Reports good tolerance without nausea, vomiting, diarrhea. No changes in petechia. Denies any other complaints.    Interval History 06/13/2024   Ms. Hays presents for follow up prior to cycle 11 of durvalumab. She presented to ED 6/6 due to shortness of breath and cough. CXR showed RUL and left basilar airspace disease for which she received levofloxacin. Today with improvement in symptoms. Denies diarrhea, nausea, vomiting.      Interval History 07/18/2024   Ms. Hays presents for follow-up, prior to cycle 13 of consolidation Durvalumab. She reports good tolerance, denies any cough/shortness of breath, NVD. Continues to have non pruritic petechia on her legs.     Interval History 08/15/2024   Ms. Hays presents for follow up, prior to C 15 of Durvalumab. Surveillance CT chest 8/1 shows airspace disease with volume loss right upper lobe similar to prior exam, felt to reflect post treatment related change.   Denies any new symptoms, continues to do well with tx.    Interval History 08/29/2024  Ms. Hays presents today for follow up, prior to cycle 16 Durvalumab. Overall, she reports that she has been doing well since her last visit. She continues to tolerate the treatments well and without any noticeable side effects. She reports a good appetite, hydrating well. Denies any nausea/vomiting or diarrhea. No rash. She is without any specific complaints at this time.     Interval History 09/12/2024   Ms. Hays presents for follow up, prior to C17 of consolidation therapy with durvalumab. She reports doing well without nausea, vomiting, diarrhea. No changes in breathing or cough. Continues to use intermittent oxygen. No new symptoms.     Interval History 11/21/2024   Ms. Hays presents for C22 of durvalumab. Surveillance CT chest shows volume loss right suprahilar consistent with evolving fibrosis, no pulmonary nodule or mass right lung to suggest recurrent disease, no hilar mediastinal adenopathy, centrilobular nodules throughout left mid lower lung zones consistent with atypical pneumonia or aspiration pneumonitis.     She does report recovering from a recent pneumonia, received antibiotics from her PCP. Denies fever/chills. No other adverse effects with last treatments.    Interval History 01/03/2025   Since our last visit, Ms. Hays had been admitted to the hospital for pneumonia. After discharge, was diagnosed with rhinovirus. This has  delayed her treatments. Reports feeling much better today. Had dysguesia during sickness, which has subsided and improved. Denies shortness of breath or cough. Notes intermittent swelling of the leg, which improves with raising of the leg.    Interval History 02/13/2025   Ms. Hays presents for her last dose of durvalumab. She reports no adverse effects to therapy thus far. Doing well overall. No further shortness of breath or cough.     Interval History 05/15/2025   Ms. Hays presents for follow up. She reports doing well since completion of durvalumab. Has a chronic cough, not worsened. Denies worsened dyspnea from baseline. Seen by Pulmonary APRN today. Was recommended to continue duo-nebs and prescribed prednisone for wheezing. We reviewed surveillance CT chest 3/20/2025 which showed stable right upper lobe 4.1 cm consolidative opacity, cardiomegaly, and non specific esophageal wall thickening. She denies any odynophagia or dysphagia.       Interval History 07/15/2025   Ms. Hays presents for follow up, accompanied by her grand daughter. She reports doing well since completion of IO. Does endorse persistent dyspnea on exertion. Denies cough. Recently seen by pulmonary, NABOR Burgos. She was ordered rechargeable concentrator and small portable tanks, which she has not received. Her appetite is on and off, denies weight loss.   Survivorship plan reviewed and provided for her to take home.      Objective     Objective     Allergy:   Allergies   Allergen Reactions    Paclitaxel Anaphylaxis        Current Medications:   Current Outpatient Medications   Medication Sig Dispense Refill    albuterol sulfate  (90 Base) MCG/ACT inhaler Inhale 2 puffs by mouth Every 4 (Four) Hours As Needed for Wheezing. 54 g 3    apixaban (ELIQUIS) 5 MG tablet tablet Take 1 tablet by mouth Every 12 (Twelve) Hours. 180 tablet 3    atorvastatin (LIPITOR) 20 MG tablet Take 1 tablet by mouth Daily.       Budeson-Glycopyrrol-Formoterol (Breztri Aerosphere) 160-9-4.8 MCG/ACT aerosol inhaler Inhale 2 puffs by mouth 2 (Two) Times a Day. 32.1 g 3    Ensifentrine (Ohtuvayre) 3 MG/2.5ML nebulizer solution Take 2.5 mL by nebulization 2 (Two) Times a Day.      esomeprazole (nexIUM) 20 MG capsule Take 1 capsule by mouth Daily.      guaiFENesin (Mucinex) 600 MG 12 hr tablet Take 2 tablets by mouth 2 (Two) Times a Day As Needed for Cough or Congestion. 120 tablet 3    HYDROcodone-acetaminophen (NORCO) 5-325 MG per tablet       hydrOXYzine pamoate (VISTARIL) 25 MG capsule Take 1 capsule by mouth Every Night.      ipratropium-albuterol (DUO-NEB) 0.5-2.5 mg/3 ml nebulizer Take 1 vial (3 mL) by nebulization Every 4 (Four) Hours As Needed for Breathing. 360 mL 3    lidocaine-prilocaine (EMLA) 2.5-2.5 % cream Apply 1 Application topically to the appropriate area as directed As Needed for Mild Pain. 5 g 3    lisinopril (PRINIVIL,ZESTRIL) 10 MG tablet Take 1 tablet by mouth Daily. 30 tablet 2    nystatin (MYCOSTATIN) 182865 UNIT/GM powder Apply  topically to the appropriate area as directed 4 (Four) Times a Day. 60 g 3     No current facility-administered medications for this visit.       Past Medical History:  Past Medical History:   Diagnosis Date    Arthritis     CHF (congestive heart failure)     Chronic anticoagulation     Eliquis    Chronic kidney disease     stage II/IIIa    Collapsed lung     COPD (chronic obstructive pulmonary disease)     Diabetes mellitus     TYPE 2    Elevated cholesterol     GERD (gastroesophageal reflux disease)     Hyperlipidemia     Hypertension     Non-small cell lung cancer RUL     Sleep apnea     non compliant with CPAP    Stroke 2019    Wears eyeglasses        Past Surgical History:  Past Surgical History:   Procedure Laterality Date    BRONCHOSCOPY Bilateral 02/27/2023    Procedure: BRONCHOSCOPY WITH ENDOBRONCHIAL ULTRASOUND;  Surgeon: Abdulkadir Peter MD;  Location: [facility];  Service: Pulmonary;   Laterality: Bilateral;    BRONCHOSCOPY WITH ION ROBOTIC ASSIST N/A 9/6/2023    Procedure: BRONCHOSCOPY WITH ION ROBOT AND EBUS;  Surgeon: John Clemens MD;  Location:  [facility];  Service: Robotics - Pulmonary;  Laterality: N/A;  Ion cath #6 - 0032,  #6 - 0030, cath guide # 0077. EBUS scope removed with balloon intact.    CARDIAC CATHETERIZATION N/A 08/22/2017    Procedure: Left Heart Cath;  Surgeon: Jatinder Matias MD;  Location:  [facility];  Service:     CARDIAC CATHETERIZATION N/A 11/22/2021    Procedure: Left Heart Cath;  Surgeon: Tolu Steinberg MD;  Location:  [facility];  Service: Cardiology;  Laterality: N/A;    COLONOSCOPY      ENDOSCOPY      GALLBLADDER SURGERY      PORTACATH PLACEMENT N/A 9/29/2023    Procedure: INSERTION OF PORTACATH;  Surgeon: Petr Velásquez MD;  Location:  [facility];  Service: General;  Laterality: N/A;    VENTRAL HERNIA REPAIR N/A 05/14/2020    Procedure: VENTRAL HERNIA REPAIR LAPAROSCOPIC WITH DAVINCI ROBOT;  Surgeon: Petr Velásquez MD;  Location:  [facility];  Service: DaVinci;  Laterality: N/A;       Social History:  Social History     Socioeconomic History    Marital status: Single    Number of children: 6   Tobacco Use    Smoking status: Former     Current packs/day: 0.00     Average packs/day: 3.0 packs/day for 16.7 years (50.2 ttl pk-yrs)     Types: Cigarettes     Start date: 4/3/1998     Quit date: 1/1/2015     Years since quitting: 10.5     Passive exposure: Past    Smokeless tobacco: Never   Vaping Use    Vaping status: Never Used   Substance and Sexual Activity    Alcohol use: No    Drug use: No    Sexual activity: Never         Family History:  Family History   Problem Relation Age of Onset    Heart disease Mother         Rhianna sophia    Heart disease Father     Heart attack Father     Heart failure Father        Review of Systems:  Review of Systems   Constitutional:  Positive for fatigue.   All other  "systems reviewed and are negative.      Vital Signs:   /65   Pulse 103   Temp 98 °F (36.7 °C) (Temporal)   Resp 20   Ht 177.8 cm (70\")   Wt 103 kg (226 lb 3.2 oz)   SpO2 90%   BMI 32.46 kg/m²      Physical Exam:  Physical Exam  Vitals reviewed.   Constitutional:       General: She is not in acute distress.     Appearance: Normal appearance. She is obese. She is not ill-appearing.      Comments: +hard of hearing   HENT:      Head: Normocephalic and atraumatic.      Mouth/Throat:      Mouth: Mucous membranes are moist.      Pharynx: Oropharynx is clear.   Eyes:      Conjunctiva/sclera: Conjunctivae normal.      Pupils: Pupils are equal, round, and reactive to light.   Cardiovascular:      Rate and Rhythm: Normal rate and regular rhythm.      Heart sounds: No murmur heard.  Pulmonary:      Effort: Pulmonary effort is normal. No respiratory distress.      Breath sounds: Wheezing present.   Chest:      Comments: +R chest port  Abdominal:      General: Abdomen is flat. Bowel sounds are normal. There is no distension.      Palpations: Abdomen is soft. There is no mass.      Tenderness: There is no abdominal tenderness. There is no guarding.   Musculoskeletal:         General: No swelling. Normal range of motion.      Cervical back: Normal range of motion.   Lymphadenopathy:      Cervical: No cervical adenopathy.   Skin:     General: Skin is warm and dry.      Comments: RLE petechiae    Neurological:      General: No focal deficit present.      Mental Status: She is alert and oriented to person, place, and time. Mental status is at baseline.   Psychiatric:         Mood and Affect: Mood normal.         PHQ-9 Score  PHQ-9 Total Score:       Pain Score  Vitals:    07/15/25 1444   BP: 114/65   Pulse: 103   Resp: 20   Temp: 98 °F (36.7 °C)   TempSrc: Temporal   SpO2: 90%   Weight: 103 kg (226 lb 3.2 oz)   Height: 177.8 cm (70\")   PainSc: 10-Worst pain ever   PainLoc: Shoulder                           ECOG score: 0 "           PAINSCOREQUALITYMETRIC:   Vitals:    07/15/25 1444   PainSc: 10-Worst pain ever   PainLoc: Shoulder                              Lab Review  Lab Results   Component Value Date    WBC 10.00 07/15/2025    HGB 11.7 (L) 07/15/2025    HCT 37.6 07/15/2025    MCV 87.2 07/15/2025    RDW 13.4 07/15/2025     07/15/2025    NEUTRORELPCT 87.1 (H) 07/15/2025    LYMPHORELPCT 7.9 (L) 07/15/2025    MONORELPCT 2.1 (L) 07/15/2025    EOSRELPCT 0.4 07/15/2025    BASORELPCT 0.8 07/15/2025    NEUTROABS 8.71 (H) 07/15/2025    LYMPHSABS 0.79 07/15/2025     Lab Results   Component Value Date     (L) 07/15/2025    K 4.4 07/15/2025    CO2 20.4 (L) 07/15/2025     07/15/2025    BUN 25.7 (H) 07/15/2025    CREATININE 1.10 (H) 07/15/2025    EGFRIFNONA 51 (L) 11/22/2021    GLUCOSE 272 (H) 07/15/2025    CALCIUM 9.0 07/15/2025    ALKPHOS 113 07/15/2025    AST 16 07/15/2025    ALT 14 07/15/2025    BILITOT 0.3 07/15/2025    ALBUMIN 4.0 07/15/2025    PROTEINTOT 7.3 07/15/2025    MG 1.9 11/23/2024    PHOS 3.5 05/18/2020     Lab Results   Component Value Date    RETICCTPCT 4.26 (H) 08/16/2023     Lab Results   Component Value Date    FERRITIN 106.00 07/15/2025    IRON 68 07/15/2025    TIBC 393 07/15/2025    LABIRON 17 (L) 07/15/2025    [id number] 365 05/15/2025    FOLATE 12.30 05/15/2025        Radiology Results    CT Chest With Contrast Diagnostic (05/30/2025 16:11)     IMPRESSION:     1. Dense consolidative opacity right upper lobe unchanged from the  previous exam  2. Cardiomegaly  3. No new pulmonary pathology.         CT Chest With Contrast Diagnostic (03/20/2025 11:36)  IMPRESSION:  1.  Stable right upper lobe 4.1 cm consolidative opacity.  2.  Cardiomegaly.  3.  Nonspecific esophageal wall thickening that could be treatment  related.      CT Chest With Contrast Diagnostic (11/11/2024 11:02)     IMPRESSION:  1.  Soft tissue density with volume loss and air bronchograms emanating  from the right suprahilar aspect has  features on CT most consistent with  evolving fibrosis.  2.  No discrete pulmonary nodule or mass has developed in the right lung  to suggest recurrent disease.  3.  No hilar or mediastinal adenopathy based on CT size criteria.  4.  Centrilobular nodules throughout the left mid-lower lung zones noted  most consistent with atypical pneumonia or aspiration pneumonitis.  5.  Mild interstitial thickening favors mild edema in the setting of CHF  or volume overload.    CT Chest With Contrast Diagnostic (08/01/2024 14:14)     FINDINGS:  Soft tissue windows demonstrate no adenopathy within the chest. The  thyroid gland is unremarkable. The heart is normal in size. The aorta is  normal in caliber. There are no pleural or pericardial effusions. Lung  windows redemonstrate airspace disease in the right upper lobe with  volume loss felt to reflect posttreatment related change. Scarring is  present in the anterior left upper lobe and right middle lobe. There is  airspace disease in both lung bases similar in appearance to the prior  exam.. The visualized upper abdomen is unremarkable. Bone windows reveal  no lytic or destructive lesions.     IMPRESSION:  Airspace disease with volume loss in the right upper lobe  similar in appearance to the prior exam felt to reflect posttreatment  related change. Continued follow-up is recommended.      CT Chest With Contrast Diagnostic (05/01/2024 09:01)     IMPRESSION:  1. Development of airspace disease in the right upper lobe which is in  the region of a previously noted spiculated nodule likely reflecting  posttreatment related change.  2. Interval improvement in the previously noted bilateral lower lobe  airspace disease with a few patchy opacities remaining.    CT Chest Without Contrast Diagnostic (12/31/2023 20:30)   IMPRESSION:  Spiculated nodule of the right upper lobe concerning for neoplasm.  Masslike consolidation in the right lower lobe could represent pneumonia  with underlying  mass not excluded. Atelectasis/airspace disease in the  left lower lobe and lingula. Mediastinal adenopathy. Follow-up  recommended.    CT Chest With Contrast Diagnostic (12/19/2023 14:47)     IMPRESSION:  1.  Significant decrease size and masslike appearance of process in the  right upper lobe which would suggest significant response to treatment.  2.  No suspicious pulmonary nodules or masses identified.  3.  Mediastinal lymph nodes are now within normal limits for size.  No  hilar or mediastinal adenopathy.  4.  Mild centrilobular nodularity of the left lower lobe which may  reflect changes of atypical pneumonia or aspiration pneumonitis.  5.  Stable cardiomegaly.  6.  Other incidental/nonacute findings above.      CT Angiogram Chest Pulmonary Embolism (10/09/2023 16:41)   MPRESSION:  1.  Right upper lobe mass in association with pneumonia has increased in  size and extent when compared to the previous exam with extension to the  pleural surface. Masslike component is approximately 56.8 x 44.5 mm and  was previously 40.1 x 36.7 mm. This would correspond to primary  malignancy.  2.  Central bronchial wall thickening. Can be seen with reactive airway  disease or bronchitis.  3.  1.3 cm left adrenal nodule is noted. No significant change from  previous.  4.  Increased size of paratracheal and mediastinal lymph nodes. A right  paratracheal lymph node is 2.5 cm and was previously 2.2 cm.  5. No PE identified.  6. Marked cardiomegaly, stable.  7. Otherwise stable chest with other nonacute/incidental findings as  above.    CT Head Without Contrast (10/09/2023 12:00)   IMPRESSION:    Unremarkable exam demonstrating no CT evidence of acute intracranial  findings.      CT Chest Without Contrast Diagnostic (08/09/2023 12:38)       NM PET/CT Skull Base to Mid Thigh (06/20/2023 11:12)         CT Chest Low Dose Cancer Screening WO (02/14/2023 12:58)   IMPRESSION:    Interval development or enlargement of a 1.9 cm spiculated  right upper  lobe pulmonary nodule concerning in appearance for malignancy and PET/CT  is recommended for further evaluation.        Pathology:     Tissue Pathology Exam (09/06/2023 08:13)         6/29/23           Assessment / Plan         Assessment and Plan   Mayra Hays is a 79 y.o. year old presents for       Non small cell lung cancer, squamous cell    Cancer Staging   Stage IIIA (cT1c, cN2, cM0)  -Oncology history as above. Patient with 1.9 cm spiculated right upper lobe nodule on low dose CT chest screen (2/2023). EBUS by Dr. Peter negative for malignancy (2/2023). PET/CT with hypermetabolism RUL 2.1 cm and pretracheal region lymph node (6/2023). CT guided biopsy negative for malignancy (6/2023). Navigational bronchoscopy with positive RUL and  4R (paratracheal) for squamous cell (9/6/2023). PD-L1 TPS score 60%.   -Completed weekly carboplatin AUC2. Patient experienced anaphylaxis 8 minutes into the paclitaxel on first cycle 10/9. Due to this, paclitaxel will be omitted from weekly chemotherapy.  -Radiation therapy 10/10; 6000 cGy in 30 treatment fractions. Complete 11/21/2023  -Post treatment CT 12/19/23 with good/partial response  -Completed consolidation therapy with durvalumab for one year 2/2025  -Survivorship plan 7/15/2025     2. Anemia; iron deficiency anemia due to GIB  - Patient with normal H/H in our system 2/2023, with acute drop in Hgb 6/29 (10.7) to Hgb (6.5) on 8/9 requiring transfusion. Patient reports one episode of melena two days ago (she is a poor historian).   -Last colonoscopy and endoscopy are unknown; think it may have been >5 years ago  -CBC from 8/14 show Hgb 8.2, Hct 27, MCV 90. Normal WBC and platelet count. Iron studies with normal iron (37), TIBC (435), and low iron saturation (9). This is in light of recent blood transfusion. Normal folate and B12 level. Reticulocyte count noted to be elevated to 6%  -Patient is with fatigue. Denies shortness of breath (aside from baseline  COPD) or chest pain. Continues to be on Eliquis for Afib  -Initial work up from consultation confirmed iron deficiency anemia, normal folate/b12 levels. No indication of hemolysis seen with normal LDH and haptoglobin. Myeloma work up has been negative with no M spike on serum protein electrophoresis and normal k/l free light chain ratio. Peripheral smear with normal total WBC without granulocytic dysplasia or blasts.  -Received Ferumoxytol 8/29/2023 and 11/27/23  -Pt to see surgery for repeat EGD and Colonoscopy, if persistent anemia     3. Malignancy associated pain  - Currently denies    4. Surveillance (per [facility]CC[facility])  -Surveillance would include CT with and without contrast every 3 to 6 months for 3 years, then every 6 months for 2 years, then low-dose noncontrast CT annually.   Next CT 8/2025    Discussed possible differential diagnoses, testing, treatment, recommended non-pharmacological interventions, risks, warning signs to monitor for that would indicate need for follow-up in clinic or ER. If no improvement with these regimens or you have new or worsening symptoms follow-up. Patient verbalizes understanding and agreement with plan of care. Denies further needs or concerns.     Patient was given instructions and counseling regarding her condition and for health maintenance advised.       All questions were answered to her satisfaction.              Meds ordered during this visit  No orders of the defined types were placed in this encounter.      Visit Diagnoses    ICD-10-CM ICD-9-CM   1. Primary cancer of right upper lobe of lung  C34.11 162.3   2. Non-small cell cancer of right lung  C34.91 162.9   3. Iron deficiency anemia, unspecified iron deficiency anemia type  D50.9 280.9                                       Follow Up   In 2 months: CBC, iron studies, ferritin, CMP, folate, B12  FU CT chest         This document has been electronically signed by Shawna Bond MD   July 15, 2025 15:41 EDT    Dictated  Utilizing Dragon Dictation: Part of this note may be an electronic transcription/translation of spoken language to printed text using the Dragon Dictation System.

## 2025-07-15 NOTE — PROGRESS NOTES
Venipuncture Blood Specimen Collection  Venipuncture performed in right arm by Marga Love MA with good hemostasis. Patient tolerated the procedure well without complications.   07/15/25   Marga Love MA

## 2025-07-16 ENCOUNTER — PATIENT OUTREACH (OUTPATIENT)
Dept: RADIATION ONCOLOGY | Facility: HOSPITAL | Age: 80
End: 2025-07-16
Payer: MEDICARE

## 2025-07-16 ENCOUNTER — PATIENT OUTREACH (OUTPATIENT)
Dept: ONCOLOGY | Facility: CLINIC | Age: 80
End: 2025-07-16
Payer: MEDICARE

## 2025-07-16 LAB
FOLATE SERPL-MCNC: >20 NG/ML (ref 4.78–24.2)
VIT B12 BLD-MCNC: 452 PG/ML (ref 211–946)

## 2025-07-16 NOTE — SIGNIFICANT NOTE
Patient called to SW phone line today, stating that she has an upcoming visit on 8/20 to flush her port at that time, and requested that if she is eligible, to apply for more gas cards. NN informed patient that supportive care team will make a note of her future appt date/time, and will reach back out to patient closer to time to see about mailing and/or delivering gas cards to her in person upon her return. Patient verbalized understanding and is in agreement with her plan.

## 2025-07-25 ENCOUNTER — DISEASE STATE MANAGEMENT VISIT (OUTPATIENT)
Dept: PHARMACY | Facility: HOSPITAL | Age: 80
End: 2025-07-25
Payer: MEDICARE

## 2025-07-25 RX ORDER — IPRATROPIUM BROMIDE AND ALBUTEROL SULFATE 2.5; .5 MG/3ML; MG/3ML
3 SOLUTION RESPIRATORY (INHALATION) EVERY 4 HOURS PRN
Qty: 360 ML | Refills: 3 | Status: SHIPPED | OUTPATIENT
Start: 2025-07-25

## 2025-07-31 ENCOUNTER — PATIENT OUTREACH (OUTPATIENT)
Dept: RADIATION ONCOLOGY | Facility: HOSPITAL | Age: 80
End: 2025-07-31
Payer: MEDICARE

## 2025-07-31 NOTE — SIGNIFICANT NOTE
Patient called and left a voicemail Jocelyn's phone requesting additional resources. NN returned the call by contacting the patient's POA, Hailey, the only number we have on file and left a voicemail explaining that the  patient has maxed out the available resources at our facility. Informed her that the only remaining amount we are allowed to give is a $10 card, which we will be mailing. I also left my contact number in case they have any questions.

## 2025-08-06 ENCOUNTER — PATIENT OUTREACH (OUTPATIENT)
Dept: ONCOLOGY | Facility: CLINIC | Age: 80
End: 2025-08-06
Payer: MEDICARE

## 2025-08-14 ENCOUNTER — HOSPITAL ENCOUNTER (OUTPATIENT)
Facility: HOSPITAL | Age: 80
Discharge: HOME | End: 2025-08-14
Admitting: INTERNAL MEDICINE
Payer: MEDICARE

## 2025-08-14 DIAGNOSIS — C34.91 NON-SMALL CELL CANCER OF RIGHT LUNG: ICD-10-CM

## 2025-08-14 PROCEDURE — 71250 CT THORAX DX C-: CPT | Performed by: RADIOLOGY

## 2025-08-14 PROCEDURE — 71250 CT THORAX DX C-: CPT

## 2025-08-20 ENCOUNTER — PATIENT OUTREACH (OUTPATIENT)
Dept: ONCOLOGY | Facility: CLINIC | Age: 80
End: 2025-08-20
Payer: MEDICARE

## 2025-08-20 ENCOUNTER — INFUSION (OUTPATIENT)
Dept: ONCOLOGY | Facility: HOSPITAL | Age: 80
End: 2025-08-20
Payer: MEDICARE

## 2025-08-20 VITALS
OXYGEN SATURATION: 96 % | RESPIRATION RATE: 20 BRPM | TEMPERATURE: 98 F | HEART RATE: 77 BPM | SYSTOLIC BLOOD PRESSURE: 151 MMHG | DIASTOLIC BLOOD PRESSURE: 70 MMHG

## 2025-08-20 DIAGNOSIS — Z95.828 PORT-A-CATH IN PLACE: Primary | ICD-10-CM

## 2025-08-20 PROCEDURE — 96523 IRRIG DRUG DELIVERY DEVICE: CPT

## 2025-08-20 PROCEDURE — 25010000002 HEPARIN LOCK FLUSH PER 10 UNITS: Performed by: NURSE PRACTITIONER

## 2025-08-20 RX ORDER — SODIUM CHLORIDE 0.9 % (FLUSH) 0.9 %
10 SYRINGE (ML) INJECTION AS NEEDED
Status: DISCONTINUED | OUTPATIENT
Start: 2025-08-20 | End: 2025-08-20 | Stop reason: HOSPADM

## 2025-08-20 RX ORDER — HEPARIN SODIUM (PORCINE) LOCK FLUSH IV SOLN 100 UNIT/ML 100 UNIT/ML
500 SOLUTION INTRAVENOUS AS NEEDED
OUTPATIENT
Start: 2025-08-20

## 2025-08-20 RX ORDER — SODIUM CHLORIDE 0.9 % (FLUSH) 0.9 %
10 SYRINGE (ML) INJECTION AS NEEDED
OUTPATIENT
Start: 2025-08-20

## 2025-08-20 RX ORDER — HEPARIN SODIUM (PORCINE) LOCK FLUSH IV SOLN 100 UNIT/ML 100 UNIT/ML
300 SOLUTION INTRAVENOUS ONCE
OUTPATIENT
Start: 2025-08-20

## 2025-08-20 RX ORDER — HEPARIN SODIUM (PORCINE) LOCK FLUSH IV SOLN 100 UNIT/ML 100 UNIT/ML
500 SOLUTION INTRAVENOUS AS NEEDED
Status: DISCONTINUED | OUTPATIENT
Start: 2025-08-20 | End: 2025-08-20 | Stop reason: HOSPADM

## 2025-08-20 RX ORDER — SODIUM CHLORIDE 0.9 % (FLUSH) 0.9 %
20 SYRINGE (ML) INJECTION AS NEEDED
OUTPATIENT
Start: 2025-08-20

## 2025-08-20 RX ADMIN — HEPARIN 500 UNITS: 100 SYRINGE at 16:17

## (undated) DEVICE — FENESTRATED BIPOLAR FORCEPS: Brand: ENDOWRIST

## (undated) DEVICE — PK LAP GEN 70

## (undated) DEVICE — NDL HYPO ECLPS SFTY 22G 1 1/2IN

## (undated) DEVICE — VISION PROBE ADAPTER AND SUCTION ADAPTER

## (undated) DEVICE — [HIGH FLOW INSUFFLATOR,  DO NOT USE IF PACKAGE IS DAMAGED,  KEEP DRY,  KEEP AWAY FROM SUNLIGHT,  PROTECT FROM HEAT AND RADIOACTIVE SOURCES.]: Brand: PNEUMOSURE

## (undated) DEVICE — REDUCER: Brand: ENDOWRIST

## (undated) DEVICE — ST EXT IV SMARTSITE 2VLV SP M LL 5ML IV1

## (undated) DEVICE — CATHETER GUIDE

## (undated) DEVICE — CVR DISP HUG U VAC STEEP TREND

## (undated) DEVICE — DRAPE, RADIAL, STERILE: Brand: MEDLINE

## (undated) DEVICE — Device: Brand: SINGLE USE ASPIRATION NEEDLE NA-U401SX

## (undated) DEVICE — SUT PROLN 3/0 8832H

## (undated) DEVICE — SUT SILK 0 SH 30IN K834H

## (undated) DEVICE — LN INJ CONTRST FLXCIL HP F/M LL 1200PSI10

## (undated) DEVICE — DRAPE,UTILTY,TAPE,15X26, 4EA/PK: Brand: MEDLINE

## (undated) DEVICE — SYR LUER SLPTP 50ML

## (undated) DEVICE — ST INF PRI SMRTSTE 20DRP 2VLV 24ML 117

## (undated) DEVICE — APPL CHLORAPREP HI/LITE 26ML ORNG

## (undated) DEVICE — FRCP BX RADJAW4 PULM WO NDL STD1.8X2 100

## (undated) DEVICE — TOTAL TRAY, 16FR 10ML SIL FOLEY, URN: Brand: MEDLINE

## (undated) DEVICE — DRSNG SURESITE WNDW 4X4.5

## (undated) DEVICE — TR BAND RADIAL ARTERY COMPRESSION DEVICE: Brand: TR BAND

## (undated) DEVICE — ADULT DISPOSABLE SINGLE-PATIENT USE PULSE OXIMETER SENSOR: Brand: NONIN

## (undated) DEVICE — DRSNG WND BORDR/ADHS NONADHR/GZ LF 2X2IN STRL

## (undated) DEVICE — DRP C/ARM W/BAND W/CLIPS 41X74IN

## (undated) DEVICE — VISION PROBE: Brand: ION

## (undated) DEVICE — SYR LUERLOK 30CC

## (undated) DEVICE — TRAP SPECI MUCUS 40CC LF STRL

## (undated) DEVICE — VLV SXN BRONCH DISP FOR FLEX ENDOSCOPE

## (undated) DEVICE — SUT MNCRYL PLS ANTIB UD 4/0 PS2 18IN

## (undated) DEVICE — ELECTRD BLD EDGE/INSUL1P SFTY SLV 2.75IN

## (undated) DEVICE — SENSR O2 OXIMAX FNGR A/ 18IN NONSTR

## (undated) DEVICE — SYR SLPTP 20CC

## (undated) DEVICE — PK BASIC 70

## (undated) DEVICE — Device: Brand: ION

## (undated) DEVICE — DRAPE,T,LAPARO,TRANS,STERILE: Brand: MEDLINE

## (undated) DEVICE — Device

## (undated) DEVICE — TIP COVER ACCESSORY

## (undated) DEVICE — UNDYED BRAIDED (POLYGLACTIN 910), SYNTHETIC ABSORBABLE SUTURE: Brand: COATED VICRYL

## (undated) DEVICE — GOWN,REINF,POLY,ECL,PP SLV,XL: Brand: MEDLINE

## (undated) DEVICE — PK CATH CARD 70

## (undated) DEVICE — PAD GRND REM POLYHESIVE A/ DISP

## (undated) DEVICE — SWIVEL CONNECTOR

## (undated) DEVICE — SOL IRR NACL 0.9PCT 1000ML

## (undated) DEVICE — CATH F5 INF 3DRC 100CM: Brand: INFINITI

## (undated) DEVICE — SUCTION CANISTER, 1500CC, RIGID: Brand: DEROYAL

## (undated) DEVICE — GW INQW FIX/CORE PTFE J/3MM .035 260CM

## (undated) DEVICE — MYNXGRIP 5F: Brand: MYNXGRIP

## (undated) DEVICE — HOLDER: Brand: DEROYAL

## (undated) DEVICE — PERMANENT CAUTERY HOOK: Brand: ENDOWRIST

## (undated) DEVICE — TBG PENCL TELESCP MEGADYNE SMOKE EVAC 10FT

## (undated) DEVICE — SINGLE USE SUCTION VALVE MAJ-209: Brand: SINGLE USE SUCTION VALVE (STERILE)

## (undated) DEVICE — PENCL E/S HNDSWCH PUSHBTN HOLSTR 10FT

## (undated) DEVICE — INTENDED FOR TISSUE SEPARATION, AND OTHER PROCEDURES THAT REQUIRE A SHARP SURGICAL BLADE TO PUNCTURE OR CUT.: Brand: BARD-PARKER ® CARBON RIB-BACK BLADES

## (undated) DEVICE — ELECTRD NDL MEGADYNE EZCLEAN NOSE 7CM

## (undated) DEVICE — SKIN AFFIX SURG ADHESIVE 72/CS 0.55ML: Brand: MEDLINE

## (undated) DEVICE — SEAL

## (undated) DEVICE — GLV SURG PREMIERPRO MIC LTX PF SZ7.5 BRN

## (undated) DEVICE — CVR PROB ULTRASND CIVFLEX GEN/PURP TELESCP/FOLD 5.5X58IN LF

## (undated) DEVICE — BOWL UTIL STRL 32OZ

## (undated) DEVICE — SYS CLOSE PORTII CARTR/THOMASN XL

## (undated) DEVICE — CANNULA SEAL

## (undated) DEVICE — RUNWAY RADL W/TOP PAD

## (undated) DEVICE — MTS LHK BAPTIST CORBIN: Brand: NAMIC

## (undated) DEVICE — GW MOVE CORE .035 145CM

## (undated) DEVICE — BLADELESS OBTURATOR: Brand: WECK VISTA

## (undated) DEVICE — BRUSH CYTO BRONCOSCOPE

## (undated) DEVICE — PATIENT RETURN ELECTRODE, SINGLE-USE, CONTACT QUALITY MONITORING, ADULT, WITH 9FT CORD, FOR PATIENTS WEIGING OVER 33LBS. (15KG): Brand: MEGADYNE

## (undated) DEVICE — SUT VIC 3/0 SH 27IN J416H

## (undated) DEVICE — ELECTRODE,FOAM,MONITORING,SLD GEL,5 PK: Brand: MEDLINE

## (undated) DEVICE — BRUSH CYTO MULTI APPL

## (undated) DEVICE — PAD, DEFIB, ADULT, RADIOTRANS, ZOLL: Brand: MEDLINE

## (undated) DEVICE — RADIFOCUS OPTITORQUE ANGIOGRAPHIC CATHETER: Brand: OPTITORQUE

## (undated) DEVICE — STERILE COTTON BALLS LARGE 5/P: Brand: MEDLINE

## (undated) DEVICE — UNDERGLV SURG BIOGEL INDICAT PF 8 GRN

## (undated) DEVICE — FRCP BIOP CAPTURA BRONCH 1.8 110CM BLU

## (undated) DEVICE — CATHETER: Brand: ION

## (undated) DEVICE — NDL HYPO ECLPS SFTY 18G 1 1/2IN

## (undated) DEVICE — BIOPSY NEEDLE, 21G: Brand: FLEXISION

## (undated) DEVICE — CATH F5 INF JL 4 100CM: Brand: INFINITI

## (undated) DEVICE — ADAPT SWVL FIBROPTIC BRONCH

## (undated) DEVICE — SINGLE USE BIOPSY VALVE MAJ-210: Brand: SINGLE USE BIOPSY VALVE (STERILE)

## (undated) DEVICE — GLIDESHEATH SLENDER STAINLESS STEEL KIT: Brand: GLIDESHEATH SLENDER

## (undated) DEVICE — SOL ANTISTICK CAUTRY ELECTROLUBE LF

## (undated) DEVICE — LN SMPL O2 NASL/ORL SMART/CAPNOLINE PLS A/

## (undated) DEVICE — ARM DRAPE

## (undated) DEVICE — SHEATH INTRO SUPERSHEATH JWIRE .035 5F 11CM

## (undated) DEVICE — Device: Brand: MEDEX

## (undated) DEVICE — COVER,MAYO STAND,STERILE: Brand: MEDLINE